# Patient Record
Sex: MALE | Race: OTHER | HISPANIC OR LATINO | URBAN - METROPOLITAN AREA
[De-identification: names, ages, dates, MRNs, and addresses within clinical notes are randomized per-mention and may not be internally consistent; named-entity substitution may affect disease eponyms.]

---

## 2019-08-29 ENCOUNTER — EMERGENCY (EMERGENCY)
Facility: HOSPITAL | Age: 44
LOS: 1 days | Discharge: HOME | End: 2019-08-29
Attending: EMERGENCY MEDICINE | Admitting: EMERGENCY MEDICINE
Payer: COMMERCIAL

## 2019-08-29 VITALS
SYSTOLIC BLOOD PRESSURE: 141 MMHG | DIASTOLIC BLOOD PRESSURE: 77 MMHG | HEART RATE: 85 BPM | TEMPERATURE: 98 F | OXYGEN SATURATION: 100 % | RESPIRATION RATE: 18 BRPM

## 2019-08-29 VITALS
RESPIRATION RATE: 17 BRPM | HEART RATE: 74 BPM | DIASTOLIC BLOOD PRESSURE: 73 MMHG | SYSTOLIC BLOOD PRESSURE: 143 MMHG | OXYGEN SATURATION: 99 % | TEMPERATURE: 98 F

## 2019-08-29 DIAGNOSIS — I77.0 ARTERIOVENOUS FISTULA, ACQUIRED: Chronic | ICD-10-CM

## 2019-08-29 LAB
ALBUMIN SERPL ELPH-MCNC: 3.3 G/DL — SIGNIFICANT CHANGE UP (ref 3.3–5)
ALP SERPL-CCNC: 106 U/L — SIGNIFICANT CHANGE UP (ref 40–120)
ALT FLD-CCNC: 16 U/L — SIGNIFICANT CHANGE UP (ref 12–78)
ANION GAP SERPL CALC-SCNC: 12 MMOL/L — SIGNIFICANT CHANGE UP (ref 5–17)
APTT BLD: 32.4 SEC — SIGNIFICANT CHANGE UP (ref 28.5–37)
AST SERPL-CCNC: 12 U/L — LOW (ref 15–37)
BASOPHILS # BLD AUTO: 0.09 K/UL — SIGNIFICANT CHANGE UP (ref 0–0.2)
BASOPHILS NFR BLD AUTO: 0.9 % — SIGNIFICANT CHANGE UP (ref 0–2)
BILIRUB SERPL-MCNC: 0.6 MG/DL — SIGNIFICANT CHANGE UP (ref 0.2–1.2)
BUN SERPL-MCNC: 33 MG/DL — HIGH (ref 7–23)
CALCIUM SERPL-MCNC: 8.7 MG/DL — SIGNIFICANT CHANGE UP (ref 8.5–10.1)
CHLORIDE SERPL-SCNC: 96 MMOL/L — SIGNIFICANT CHANGE UP (ref 96–108)
CO2 SERPL-SCNC: 29 MMOL/L — SIGNIFICANT CHANGE UP (ref 22–31)
CREAT SERPL-MCNC: 7.4 MG/DL — HIGH (ref 0.5–1.3)
EOSINOPHIL # BLD AUTO: 0.47 K/UL — SIGNIFICANT CHANGE UP (ref 0–0.5)
EOSINOPHIL NFR BLD AUTO: 4.6 % — SIGNIFICANT CHANGE UP (ref 0–6)
GLUCOSE SERPL-MCNC: 160 MG/DL — HIGH (ref 70–99)
HCT VFR BLD CALC: 38.9 % — LOW (ref 39–50)
HGB BLD-MCNC: 12 G/DL — LOW (ref 13–17)
IMM GRANULOCYTES NFR BLD AUTO: 0.7 % — SIGNIFICANT CHANGE UP (ref 0–1.5)
INR BLD: 1.18 RATIO — HIGH (ref 0.88–1.16)
LACTATE SERPL-SCNC: 1.8 MMOL/L — SIGNIFICANT CHANGE UP (ref 0.7–2)
LYMPHOCYTES # BLD AUTO: 3.85 K/UL — HIGH (ref 1–3.3)
LYMPHOCYTES # BLD AUTO: 37.6 % — SIGNIFICANT CHANGE UP (ref 13–44)
MCHC RBC-ENTMCNC: 28.8 PG — SIGNIFICANT CHANGE UP (ref 27–34)
MCHC RBC-ENTMCNC: 30.8 GM/DL — LOW (ref 32–36)
MCV RBC AUTO: 93.5 FL — SIGNIFICANT CHANGE UP (ref 80–100)
MONOCYTES # BLD AUTO: 1.12 K/UL — HIGH (ref 0–0.9)
MONOCYTES NFR BLD AUTO: 10.9 % — SIGNIFICANT CHANGE UP (ref 2–14)
NEUTROPHILS # BLD AUTO: 4.64 K/UL — SIGNIFICANT CHANGE UP (ref 1.8–7.4)
NEUTROPHILS NFR BLD AUTO: 45.3 % — SIGNIFICANT CHANGE UP (ref 43–77)
NRBC # BLD: 0 /100 WBCS — SIGNIFICANT CHANGE UP (ref 0–0)
PLATELET # BLD AUTO: 397 K/UL — SIGNIFICANT CHANGE UP (ref 150–400)
POTASSIUM SERPL-MCNC: 4.3 MMOL/L — SIGNIFICANT CHANGE UP (ref 3.5–5.3)
POTASSIUM SERPL-SCNC: 4.3 MMOL/L — SIGNIFICANT CHANGE UP (ref 3.5–5.3)
PROT SERPL-MCNC: 9.3 G/DL — HIGH (ref 6–8.3)
PROTHROM AB SERPL-ACNC: 13.5 SEC — HIGH (ref 10–12.9)
RBC # BLD: 4.16 M/UL — LOW (ref 4.2–5.8)
RBC # FLD: 15.9 % — HIGH (ref 10.3–14.5)
SODIUM SERPL-SCNC: 137 MMOL/L — SIGNIFICANT CHANGE UP (ref 135–145)
WBC # BLD: 10.24 K/UL — SIGNIFICANT CHANGE UP (ref 3.8–10.5)
WBC # FLD AUTO: 10.24 K/UL — SIGNIFICANT CHANGE UP (ref 3.8–10.5)

## 2019-08-29 PROCEDURE — 73140 X-RAY EXAM OF FINGER(S): CPT | Mod: 26,RT

## 2019-08-29 PROCEDURE — 96365 THER/PROPH/DIAG IV INF INIT: CPT | Mod: XU

## 2019-08-29 PROCEDURE — 73140 X-RAY EXAM OF FINGER(S): CPT

## 2019-08-29 PROCEDURE — 87040 BLOOD CULTURE FOR BACTERIA: CPT

## 2019-08-29 PROCEDURE — 85730 THROMBOPLASTIN TIME PARTIAL: CPT

## 2019-08-29 PROCEDURE — 99284 EMERGENCY DEPT VISIT MOD MDM: CPT

## 2019-08-29 PROCEDURE — 29130 APPL FINGER SPLINT STATIC: CPT | Mod: F9

## 2019-08-29 PROCEDURE — 80053 COMPREHEN METABOLIC PANEL: CPT

## 2019-08-29 PROCEDURE — 36415 COLL VENOUS BLD VENIPUNCTURE: CPT

## 2019-08-29 PROCEDURE — 85610 PROTHROMBIN TIME: CPT

## 2019-08-29 PROCEDURE — 85027 COMPLETE CBC AUTOMATED: CPT

## 2019-08-29 PROCEDURE — 83605 ASSAY OF LACTIC ACID: CPT

## 2019-08-29 PROCEDURE — 99284 EMERGENCY DEPT VISIT MOD MDM: CPT | Mod: 25

## 2019-08-29 RX ORDER — MUPIROCIN 20 MG/G
1 OINTMENT TOPICAL ONCE
Refills: 0 | Status: COMPLETED | OUTPATIENT
Start: 2019-08-29 | End: 2019-08-29

## 2019-08-29 RX ORDER — VANCOMYCIN HCL 1 G
1000 VIAL (EA) INTRAVENOUS ONCE
Refills: 0 | Status: COMPLETED | OUTPATIENT
Start: 2019-08-29 | End: 2019-08-29

## 2019-08-29 RX ORDER — SODIUM CHLORIDE 9 MG/ML
3 INJECTION INTRAMUSCULAR; INTRAVENOUS; SUBCUTANEOUS ONCE
Refills: 0 | Status: COMPLETED | OUTPATIENT
Start: 2019-08-29 | End: 2019-08-29

## 2019-08-29 RX ORDER — ACETAMINOPHEN 500 MG
650 TABLET ORAL ONCE
Refills: 0 | Status: COMPLETED | OUTPATIENT
Start: 2019-08-29 | End: 2019-08-29

## 2019-08-29 RX ADMIN — Medication 650 MILLIGRAM(S): at 18:55

## 2019-08-29 RX ADMIN — MUPIROCIN 1 APPLICATION(S): 20 OINTMENT TOPICAL at 20:00

## 2019-08-29 RX ADMIN — Medication 250 MILLIGRAM(S): at 18:54

## 2019-08-29 RX ADMIN — SODIUM CHLORIDE 3 MILLILITER(S): 9 INJECTION INTRAMUSCULAR; INTRAVENOUS; SUBCUTANEOUS at 18:56

## 2019-08-29 RX ADMIN — Medication 1000 MILLIGRAM(S): at 20:00

## 2019-08-29 RX ADMIN — Medication 650 MILLIGRAM(S): at 19:55

## 2019-08-29 NOTE — ED PROVIDER NOTE - NSFOLLOWUPINSTRUCTIONS_ED_ALL_ED_FT
1. FOLLOW UP WITH YOUR PRIMARY DOCTOR IN 24-48 HOURS.   2. FOLLOW UP WITH ALL SPECIALIST DISCUSSED DURING YOUR VISIT.   3. TAKE ALL MEDICATIONS PRESCRIBED IN THE ER IF ANY ARE PRESCRIBED. CONTINUE YOUR HOME MEDICATIONS UNLESS OTHERWISE ADVISED DIFFERENTLY.   4. RETURN FOR WORSENING SYMPTOMS OR CONCERNS INCLUDING BUT NOT LIMITED TO FEVER, CHEST PAIN, OR TROUBLE BREATHING OR ANY OTHER CONCERNS  5. change dressing daily and apply bactroban given to you in er twice daily  6. soak finger three times a day in betadine given to you with saline that was given to you  7. take clindamycin daily as prescribed.  8 FOLLOW UP WITH DR PASTRANA [phone]

## 2019-08-29 NOTE — ED PROVIDER NOTE - OBJECTIVE STATEMENT
pt is a 42yo male with pmhx of ckd on dialysis, dm on insulin presents with finger pain x 1 week. pt is a 42yo male with pmhx of ckd on dialysis, dm on insulin presents with finger pain x 1 week. pt reports he closed in right hand 5th hayder in a door a week ago. pt reports redness with swelling and abrasion. pt did not take anything for pain. pt denies fever, chills,cp pt is a 44yo male with pmhx of ckd on dialysis, dm on insulin presents with finger pain x 1 week. pt reports he closed in right hand 5th finger in a door a week ago. pt reports redness with swelling and abrasion. pt did not take anything for pain. pt denies fever, chills,cp

## 2019-08-29 NOTE — ED ADULT NURSE NOTE - OBJECTIVE STATEMENT
Pt. received alert and oriented x3 with chief complaint of right hand fifth finger edema post slamming in between car door 2 weeks ago. Pt. presents w/ wound to right hand fifth finger, red and swollen. Pt. denies fever or chills.

## 2019-08-29 NOTE — ED PROVIDER NOTE - ATTENDING CONTRIBUTION TO CARE
Pt seen and examined and d/w PA.  agree with a and p.  pt is 44 yo male with hx of renal disease with right av graft. pt slammed hand in door one week ago and with small cut and pain.  pt states finger now getting red, no pus, no f/c.  on exam with deformity flexion at 5th pip.  scabbing at extensor pip and redness up finger, no pain on passive rom. xray with fx dislocation at pip. d/w hand surg abx, splint, fu in next few days for outpt eval and repair.

## 2019-08-29 NOTE — ED PROVIDER NOTE - PHYSICAL EXAMINATION
+ thrill r ue + fistula  ms r ue:+ 5th digit swelling and erythema. unable to rom sensation intact + 2 radial

## 2019-08-29 NOTE — ED PROVIDER NOTE - CARE PROVIDER_API CALL
DR PASTRANA,   Phone: [phone]  Fax: (   )    -  Follow Up Time:     Brad Pastrana (MD)  Surgery  [address] [place] [id number]  Phone: [phone]  Fax: [phone]  Follow Up Time: 1-3 Days

## 2019-08-29 NOTE — ED PROVIDER NOTE - CARE PLAN
Principal Discharge DX:	Open displaced fracture of distal phalanx of right little finger, initial encounter  Secondary Diagnosis:	Finger infection

## 2019-08-29 NOTE — ED PROVIDER NOTE - PROGRESS NOTE DETAILS
consulted hand la peralta who advised abx and splint. wound care provided. advised bactroban and clinda daily. advised on worsening symptoms and when to return to ed. All imaging and labs reviewed. all results reviewed with pt including abnormal results. pt given a copy of results. pt advised to follow up with pmd regarding abnormal results. All questions answered and concerns addressed. pt verbalized understanding and agreement with plan and dx. pt advised on next step and when/where to follow up. pt advised on all take home and otc medications. pt advised to follow up with PMD. pt advised to return to ed for worsenng symptoms including fever, cp, sob. will dc.

## 2019-08-29 NOTE — ED PROVIDER NOTE - PROVIDER TOKENS
FREE:[LAST:[DR PATSRANA],PHONE:[phone]],FAX:[phone]   [phone]    -]],PROVIDER:[TOKEN:[[id number]],FOLLOWUP:[1-3 Days]]

## 2019-08-29 NOTE — ED ADULT NURSE NOTE - CHIEF COMPLAINT QUOTE
Patient c/o right 5th swollen finger, patient report slammed finger 2 weeks ago while closing a  door

## 2019-09-04 LAB
CULTURE RESULTS: SIGNIFICANT CHANGE UP
CULTURE RESULTS: SIGNIFICANT CHANGE UP
SPECIMEN SOURCE: SIGNIFICANT CHANGE UP
SPECIMEN SOURCE: SIGNIFICANT CHANGE UP

## 2019-11-04 ENCOUNTER — APPOINTMENT (OUTPATIENT)
Dept: PODIATRY | Facility: HOSPITAL | Age: 44
End: 2019-11-04
Payer: COMMERCIAL

## 2019-11-04 ENCOUNTER — OUTPATIENT (OUTPATIENT)
Dept: OUTPATIENT SERVICES | Facility: HOSPITAL | Age: 44
LOS: 1 days | Discharge: HOME | End: 2019-11-04
Payer: COMMERCIAL

## 2019-11-04 VITALS
HEART RATE: 82 BPM | DIASTOLIC BLOOD PRESSURE: 75 MMHG | HEIGHT: 67 IN | WEIGHT: 170 LBS | RESPIRATION RATE: 20 BRPM | BODY MASS INDEX: 26.68 KG/M2 | OXYGEN SATURATION: 100 % | SYSTOLIC BLOOD PRESSURE: 148 MMHG | TEMPERATURE: 97.2 F

## 2019-11-04 DIAGNOSIS — N19 UNSPECIFIED KIDNEY FAILURE: ICD-10-CM

## 2019-11-04 DIAGNOSIS — L97.401 NON-PRESSURE CHRONIC ULCER OF UNSPECIFIED HEEL AND MIDFOOT LIMITED TO BREAKDOWN OF SKIN: ICD-10-CM

## 2019-11-04 DIAGNOSIS — E11.9 TYPE 2 DIABETES MELLITUS W/OUT COMPLICATIONS: ICD-10-CM

## 2019-11-04 DIAGNOSIS — I77.0 ARTERIOVENOUS FISTULA, ACQUIRED: Chronic | ICD-10-CM

## 2019-11-04 DIAGNOSIS — Z78.9 OTHER SPECIFIED HEALTH STATUS: ICD-10-CM

## 2019-11-04 PROBLEM — Z00.00 ENCOUNTER FOR PREVENTIVE HEALTH EXAMINATION: Noted: 2019-11-04

## 2019-11-04 PROBLEM — N18.9 CHRONIC KIDNEY DISEASE, UNSPECIFIED: Chronic | Status: ACTIVE | Noted: 2019-08-29

## 2019-11-04 PROBLEM — Z99.2 DEPENDENCE ON RENAL DIALYSIS: Chronic | Status: ACTIVE | Noted: 2019-08-29

## 2019-11-04 LAB
GRAM STN FLD: SIGNIFICANT CHANGE UP
SPECIMEN SOURCE: SIGNIFICANT CHANGE UP

## 2019-11-04 PROCEDURE — 11043 DBRDMT MUSC&/FSCA 1ST 20/<: CPT

## 2019-11-04 PROCEDURE — G0463: CPT | Mod: 25

## 2019-11-04 PROCEDURE — 87186 SC STD MICRODIL/AGAR DIL: CPT

## 2019-11-04 PROCEDURE — 99203 OFFICE O/P NEW LOW 30 MIN: CPT | Mod: 25

## 2019-11-04 PROCEDURE — 87070 CULTURE OTHR SPECIMN AEROBIC: CPT

## 2019-11-04 RX ORDER — GLUC/MSM/COLGN2/HYAL/ANTIARTH3 375-375-20
TABLET ORAL
Refills: 0 | Status: ACTIVE | COMMUNITY

## 2019-11-04 RX ORDER — INSULIN LISPRO 100 [IU]/ML
100 INJECTION, SOLUTION INTRAVENOUS; SUBCUTANEOUS
Refills: 0 | Status: ACTIVE | COMMUNITY

## 2019-11-04 RX ORDER — INSULIN DETEMIR 100 [IU]/ML
100 INJECTION, SOLUTION SUBCUTANEOUS AT BEDTIME
Refills: 0 | Status: ACTIVE | COMMUNITY

## 2019-11-05 NOTE — PLAN
[FreeTextEntry1] : wound care , off loading , MRI , X rays , patient would greatly benefit from HBOT for DFU 3 and clinical OM of the left heel , patient high risk for limb loss

## 2019-11-05 NOTE — REVIEW OF SYSTEMS
[Arthralgias] : arthralgias [Joint Stiffness] : joint stiffness [Skin Wound] : skin wound [Fever] : no fever [Chills] : no chills [Loss Of Hearing] : no hearing loss [Shortness Of Breath] : no shortness of breath [Abdominal Pain] : no abdominal pain [Vomiting] : no vomiting [Anxiety] : no anxiety [Easy Bleeding] : no tendency for easy bleeding [FreeTextEntry5] : diabetic small vessel diseas and cardio vascular disease  [de-identified] : DFU 3 plantar posterior left heel , ssf [de-identified] : IDDM with neuropathy  [de-identified] : IDDM , patient on Dialysis

## 2019-11-05 NOTE — HISTORY OF PRESENT ILLNESS
[FreeTextEntry1] : Patient with multiple complications from IDDM  patient has had multiple foot surgeries and presently has DFU3  posterior left heel , ssf , possible OM

## 2019-11-06 ENCOUNTER — OTHER (OUTPATIENT)
Age: 44
End: 2019-11-06

## 2019-11-06 LAB
-  AMIKACIN: SIGNIFICANT CHANGE UP
-  AMOXICILLIN/CLAVULANIC ACID: SIGNIFICANT CHANGE UP
-  AMPICILLIN/SULBACTAM: SIGNIFICANT CHANGE UP
-  AMPICILLIN: SIGNIFICANT CHANGE UP
-  AZTREONAM: SIGNIFICANT CHANGE UP
-  CEFAZOLIN: SIGNIFICANT CHANGE UP
-  CEFEPIME: SIGNIFICANT CHANGE UP
-  CEFOXITIN: SIGNIFICANT CHANGE UP
-  CEFTRIAXONE: SIGNIFICANT CHANGE UP
-  CIPROFLOXACIN: SIGNIFICANT CHANGE UP
-  ERTAPENEM: SIGNIFICANT CHANGE UP
-  GENTAMICIN: SIGNIFICANT CHANGE UP
-  IMIPENEM: SIGNIFICANT CHANGE UP
-  LEVOFLOXACIN: SIGNIFICANT CHANGE UP
-  MEROPENEM: SIGNIFICANT CHANGE UP
-  PIPERACILLIN/TAZOBACTAM: SIGNIFICANT CHANGE UP
-  TOBRAMYCIN: SIGNIFICANT CHANGE UP
-  TRIMETHOPRIM/SULFAMETHOXAZOLE: SIGNIFICANT CHANGE UP
METHOD TYPE: SIGNIFICANT CHANGE UP

## 2019-11-06 NOTE — ASSESSMENT
[Stable] : stable [Home] : Home [Stretcher] : Stretcher [Not Applicable - Long Term Care/Home Health Agency] : Long Term Care/Home Health Agency: Not Applicable [Verbal] : Verbal [Written] : Written [Demo] : Demo [Patient] : Patient [Good - alert, interested, motivated] : Good - alert, interested, motivated [Verbalizes knowledge/Understanding] : Verbalizes knowledge/understanding [Dressing changes] : dressing changes [Foot Care] : foot care [Skin Care] : skin care [Signs and symptoms of infection] : sign and symptoms of infection [Surgery] : surgery [How and When to Call] : how and when to call [Labs and Tests] : labs and tests [Hyperbaric Therapy] : hyperbaric therapy [Off-loading] : off-loading [Patient responsibility to plan of care] : patient responsibility to plan of care [Glycemic Control] : glycemic control [FreeTextEntry4] : Debridement performed. \par Auth submitted for debridement, vascular studies, MRI, and HBO\par HBO consent submitted\par Video and HBO checklist signed and completed \par MRI, x ray, vascular studies, and blood work scripts provided for patient \par TM evaluation still to be completed. \par Tissue culture sent to lab\par Follow up in 1 week

## 2019-11-06 NOTE — REASON FOR VISIT
[Consultation] : a consultation visit [FreeTextEntry5] : Left plantar heel  [FreeTextEntry4] : Diabetic patient noticed a ulcer on left foot 3 weeks ago. Patient saw Dr. Trena RUANO last week who referred patient to [facility] [place]. \par Patient stated he had the ulcer opened up and cleaned out 12/2018 with a left heel calcanectomy. Patient stayed at [facility] post procedure and had intravenous antibiotics. Patient also had hyperbarics at [facility] last year as well.

## 2019-11-06 NOTE — PROCEDURE
[Betadine] : betadine [Fibrotic] : fibrotic [Slough] : slough [Necrotic] : necrotic [Scalpel] : scalpel [Sharp scissors] : sharp scissors [Skin] : skin [Fat] : fat [Fascia] : fascia [Sent to Lab] : which was entirely removed and was sent to the laboratory for [Culture and Sensitivity] : culture and sensitivity [Clean] : clean [Pink] : pink [Pressure] : pressure [AgNo3 Cauterization] : AgNo3 cauterization [FreeTextEntry1] : silver alginate  [] : No [FreeTextEntry9] : 1105hr [de-identified] : DFU 3 left heel  [de-identified] : Chao  [FreeTextEntry6] : DFU 3 left heel  [FreeTextEntry7] : same  [de-identified] : 0 [de-identified] : 5cc [de-identified] : necrotic skin, subcutaneous and fat

## 2019-11-06 NOTE — PHYSICAL EXAM
[4 x 4] : 4 x 4  [Abdominal Pad] : Abdominal Pad [0] : left 0 [1+] : left 1+ [Varicose Veins Of Lower Extremities] : bilaterally [Ankle Swelling On The Right] : mild [No Rash or Lesion] : No rash or lesion [Skin Ulcer] : ulcer [Calm] : calm [Purpura] : no purpura  [Petechiae] : no petechiae [de-identified] : comfortable  [de-identified] : Diabetic small vessel and cardio vascular disease  [de-identified] : previous calcanectomy of the right heel , possible OM of the left heel , OM of the right pinkie finger  [de-identified] : DFU 3 platar posterior left heel , skin, subcutaneous and fat  [de-identified] : Diabetic neuropathy , Dialysis  3x per week  [FreeTextEntry1] : Left plantar heel  [FreeTextEntry2] : 1.4 [FreeTextEntry3] : 2.0 [FreeTextEntry4] : 0.1 [de-identified] : Serous/sanguinous [de-identified] : Hard callus  [de-identified] : Tissue  [de-identified] : Silver alginate [de-identified] : Cleansed with NS\par Kerlix\par No neurovascular deficits noted.\par \par Post debridement measurements: 1.6/2.2/0.1\par \par Small amount of bleeding during procedure. Patient reports no post procedure pain. Patient tolerated well. \par \par  [de-identified] : Dorsalis Pedis: 0\par Posterior Tibialis: 0\par Doppler pulses:  Present\par Extremity color: Pink \par Extremity temperature: Warm \par Capillary refill: < 3 \par \par  [TWNoteComboBox4] : Moderate [de-identified] : None [de-identified] : None [de-identified] : >75% [de-identified] : No [TWNoteComboBox7] : Isabela [de-identified] : Cultures obtained [de-identified] : Every other day [de-identified] : Primary Dressing

## 2019-11-06 NOTE — PROCEDURE
[Betadine] : betadine [Fibrotic] : fibrotic [Slough] : slough [Necrotic] : necrotic [Scalpel] : scalpel [Sharp scissors] : sharp scissors [Skin] : skin [Fat] : fat [Fascia] : fascia [Sent to Lab] : which was entirely removed and was sent to the laboratory for [Culture and Sensitivity] : culture and sensitivity [Clean] : clean [Pink] : pink [Pressure] : pressure [AgNo3 Cauterization] : AgNo3 cauterization [FreeTextEntry1] : silver alginate  [] : No [FreeTextEntry9] : 1105hr [de-identified] : DFU 3 left heel  [de-identified] : Chao  [FreeTextEntry6] : DFU 3 left heel  [FreeTextEntry7] : same  [de-identified] : 0 [de-identified] : 5cc [de-identified] : necrotic skin, subcutaneous and fat

## 2019-11-06 NOTE — REASON FOR VISIT
[Consultation] : a consultation visit [FreeTextEntry5] : Left plantar heel  [FreeTextEntry4] : Diabetic patient noticed a ulcer on left foot 3 weeks ago. Patient saw Dr. Trena RUANO last week who referred patient to [facility] [place]. \par Patient stated he had the ulcer opened up and cleaned out 12/2018 with a left heel calcanectomy. Patient stayed at [facility] post procedure and had intravenous antibiotics. Patient also had hyperbarics at [facility] last year as well.

## 2019-11-06 NOTE — PHYSICAL EXAM
[4 x 4] : 4 x 4  [Abdominal Pad] : Abdominal Pad [0] : left 0 [1+] : left 1+ [Varicose Veins Of Lower Extremities] : bilaterally [Ankle Swelling On The Right] : mild [No Rash or Lesion] : No rash or lesion [Skin Ulcer] : ulcer [Calm] : calm [Purpura] : no purpura  [Petechiae] : no petechiae [de-identified] : comfortable  [de-identified] : Diabetic small vessel and cardio vascular disease  [de-identified] : previous calcanectomy of the right heel , possible OM of the left heel , OM of the right pinkie finger  [de-identified] : DFU 3 platar posterior left heel , skin, subcutaneous and fat  [de-identified] : Diabetic neuropathy , Dialysis  3x per week  [FreeTextEntry1] : Left plantar heel  [FreeTextEntry2] : 1.4 [FreeTextEntry3] : 2.0 [FreeTextEntry4] : 0.1 [de-identified] : Serous/sanguinous [de-identified] : Hard callus  [de-identified] : Tissue  [de-identified] : Silver alginate [de-identified] : Cleansed with NS\par Kerlix\par No neurovascular deficits noted.\par \par Post debridement measurements: 1.6/2.2/0.1\par \par Small amount of bleeding during procedure. Patient reports no post procedure pain. Patient tolerated well. \par \par  [de-identified] : Dorsalis Pedis: 0\par Posterior Tibialis: 0\par Doppler pulses:  Present\par Extremity color: Pink \par Extremity temperature: Warm \par Capillary refill: < 3 \par \par  [TWNoteComboBox4] : Moderate [de-identified] : None [de-identified] : None [de-identified] : >75% [de-identified] : No [TWNoteComboBox7] : Isabela [de-identified] : Cultures obtained [de-identified] : Every other day [de-identified] : Primary Dressing

## 2019-11-07 DIAGNOSIS — E11.621 TYPE 2 DIABETES MELLITUS WITH FOOT ULCER: ICD-10-CM

## 2019-11-07 DIAGNOSIS — E11.69 TYPE 2 DIABETES MELLITUS WITH OTHER SPECIFIED COMPLICATION: ICD-10-CM

## 2019-11-07 DIAGNOSIS — Z89.422 ACQUIRED ABSENCE OF OTHER LEFT TOE(S): ICD-10-CM

## 2019-11-07 DIAGNOSIS — Z99.2 DEPENDENCE ON RENAL DIALYSIS: ICD-10-CM

## 2019-11-07 DIAGNOSIS — N19 UNSPECIFIED KIDNEY FAILURE: ICD-10-CM

## 2019-11-07 DIAGNOSIS — Z79.899 OTHER LONG TERM (CURRENT) DRUG THERAPY: ICD-10-CM

## 2019-11-07 DIAGNOSIS — E11.51 TYPE 2 DIABETES MELLITUS WITH DIABETIC PERIPHERAL ANGIOPATHY WITHOUT GANGRENE: ICD-10-CM

## 2019-11-07 DIAGNOSIS — M86.672 OTHER CHRONIC OSTEOMYELITIS, LEFT ANKLE AND FOOT: ICD-10-CM

## 2019-11-07 DIAGNOSIS — L97.422 NON-PRESSURE CHRONIC ULCER OF LEFT HEEL AND MIDFOOT WITH FAT LAYER EXPOSED: ICD-10-CM

## 2019-11-07 DIAGNOSIS — E11.22 TYPE 2 DIABETES MELLITUS WITH DIABETIC CHRONIC KIDNEY DISEASE: ICD-10-CM

## 2019-11-07 DIAGNOSIS — Z79.4 LONG TERM (CURRENT) USE OF INSULIN: ICD-10-CM

## 2019-11-07 DIAGNOSIS — Z98.41 CATARACT EXTRACTION STATUS, RIGHT EYE: ICD-10-CM

## 2019-11-07 DIAGNOSIS — I83.93 ASYMPTOMATIC VARICOSE VEINS OF BILATERAL LOWER EXTREMITIES: ICD-10-CM

## 2019-11-07 DIAGNOSIS — E11.40 TYPE 2 DIABETES MELLITUS WITH DIABETIC NEUROPATHY, UNSPECIFIED: ICD-10-CM

## 2019-11-07 DIAGNOSIS — Z98.42 CATARACT EXTRACTION STATUS, LEFT EYE: ICD-10-CM

## 2019-11-07 LAB
-  AMPICILLIN/SULBACTAM: SIGNIFICANT CHANGE UP
-  AMPICILLIN: SIGNIFICANT CHANGE UP
-  CEFAZOLIN: SIGNIFICANT CHANGE UP
-  CLINDAMYCIN: SIGNIFICANT CHANGE UP
-  DAPTOMYCIN: SIGNIFICANT CHANGE UP
-  ERYTHROMYCIN: SIGNIFICANT CHANGE UP
-  GENTAMICIN: SIGNIFICANT CHANGE UP
-  LINEZOLID: SIGNIFICANT CHANGE UP
-  OXACILLIN: SIGNIFICANT CHANGE UP
-  PENICILLIN: SIGNIFICANT CHANGE UP
-  RIFAMPIN: SIGNIFICANT CHANGE UP
-  TETRACYCLINE: SIGNIFICANT CHANGE UP
-  TETRACYCLINE: SIGNIFICANT CHANGE UP
-  TRIMETHOPRIM/SULFAMETHOXAZOLE: SIGNIFICANT CHANGE UP
-  VANCOMYCIN: SIGNIFICANT CHANGE UP
-  VANCOMYCIN: SIGNIFICANT CHANGE UP
METHOD TYPE: SIGNIFICANT CHANGE UP
METHOD TYPE: SIGNIFICANT CHANGE UP

## 2019-11-09 LAB
CULTURE RESULTS: SIGNIFICANT CHANGE UP
ORGANISM # SPEC MICROSCOPIC CNT: SIGNIFICANT CHANGE UP
SPECIMEN SOURCE: SIGNIFICANT CHANGE UP

## 2019-11-11 ENCOUNTER — FORM ENCOUNTER (OUTPATIENT)
Age: 44
End: 2019-11-11

## 2019-11-12 ENCOUNTER — APPOINTMENT (OUTPATIENT)
Dept: SURGERY | Facility: HOSPITAL | Age: 44
End: 2019-11-12
Payer: COMMERCIAL

## 2019-11-12 ENCOUNTER — APPOINTMENT (OUTPATIENT)
Dept: PODIATRY | Facility: HOSPITAL | Age: 44
End: 2019-11-12

## 2019-11-12 ENCOUNTER — OUTPATIENT (OUTPATIENT)
Dept: OUTPATIENT SERVICES | Facility: HOSPITAL | Age: 44
LOS: 1 days | Discharge: HOME | End: 2019-11-12
Payer: COMMERCIAL

## 2019-11-12 VITALS
BODY MASS INDEX: 26.68 KG/M2 | SYSTOLIC BLOOD PRESSURE: 101 MMHG | WEIGHT: 170 LBS | OXYGEN SATURATION: 100 % | RESPIRATION RATE: 20 BRPM | HEART RATE: 84 BPM | HEIGHT: 67 IN | TEMPERATURE: 97.5 F | DIASTOLIC BLOOD PRESSURE: 54 MMHG

## 2019-11-12 DIAGNOSIS — Z89.422 ACQUIRED ABSENCE OF OTHER LEFT TOE(S): ICD-10-CM

## 2019-11-12 DIAGNOSIS — Z98.49 CATARACT EXTRACTION STATUS, UNSPECIFIED EYE: ICD-10-CM

## 2019-11-12 DIAGNOSIS — L97.422 NON-PRESSURE CHRONIC ULCER OF LEFT HEEL AND MIDFOOT WITH FAT LAYER EXPOSED: ICD-10-CM

## 2019-11-12 DIAGNOSIS — E11.621 TYPE 2 DIABETES MELLITUS WITH FOOT ULCER: ICD-10-CM

## 2019-11-12 DIAGNOSIS — E11.29 TYPE 2 DIABETES MELLITUS WITH OTHER DIABETIC KIDNEY COMPLICATION: ICD-10-CM

## 2019-11-12 DIAGNOSIS — Z99.2 DEPENDENCE ON RENAL DIALYSIS: ICD-10-CM

## 2019-11-12 DIAGNOSIS — Z79.899 OTHER LONG TERM (CURRENT) DRUG THERAPY: ICD-10-CM

## 2019-11-12 DIAGNOSIS — N19 UNSPECIFIED KIDNEY FAILURE: ICD-10-CM

## 2019-11-12 DIAGNOSIS — E11.69 TYPE 2 DIABETES MELLITUS WITH OTHER SPECIFIED COMPLICATION: ICD-10-CM

## 2019-11-12 DIAGNOSIS — M86.672 OTHER CHRONIC OSTEOMYELITIS, LEFT ANKLE AND FOOT: ICD-10-CM

## 2019-11-12 DIAGNOSIS — E88.9 TYPE 2 DIABETES MELLITUS WITH OTHER SPECIFIED COMPLICATION: ICD-10-CM

## 2019-11-12 DIAGNOSIS — Z89.429 ACQUIRED ABSENCE OF OTHER TOE(S), UNSPECIFIED SIDE: ICD-10-CM

## 2019-11-12 DIAGNOSIS — Z79.4 LONG TERM (CURRENT) USE OF INSULIN: ICD-10-CM

## 2019-11-12 DIAGNOSIS — I77.0 ARTERIOVENOUS FISTULA, ACQUIRED: Chronic | ICD-10-CM

## 2019-11-12 PROCEDURE — 71045 X-RAY EXAM CHEST 1 VIEW: CPT | Mod: 26

## 2019-11-12 PROCEDURE — 73630 X-RAY EXAM OF FOOT: CPT

## 2019-11-12 PROCEDURE — 73630 X-RAY EXAM OF FOOT: CPT | Mod: 26,50

## 2019-11-12 PROCEDURE — G0463: CPT

## 2019-11-12 PROCEDURE — 99213 OFFICE O/P EST LOW 20 MIN: CPT | Mod: 25

## 2019-11-12 PROCEDURE — 71045 X-RAY EXAM CHEST 1 VIEW: CPT

## 2019-11-12 NOTE — ASSESSMENT
[Verbal] : Verbal [Patient] : Patient [Fair - mild discomfort, physical impairment, low acceptance] : Fair - mild discomfort, physical impairment, low acceptance [Needs reinforcement] : needs reinforcement [Foot Care] : foot care [Skin Care] : skin care [Pressure relief] : pressure relief [Signs and symptoms of infection] : sign and symptoms of infection [How and When to Call] : how and when to call [Hyperbaric Therapy] : hyperbaric therapy [Patient responsibility to plan of care] : patient responsibility to plan of care [Off-loading] : off-loading [Stable] : stable [Hospital] : Hospital [Not Applicable - Long Term Care/Home Health Agency] : Long Term Care/Home Health Agency: Not Applicable [Wheelchair] : Wheelchair [] : No [FreeTextEntry2] : Infection Prevention \par HBOT\par F/U 1 week or pending completion of testing [FreeTextEntry4] : MD ordered bloodwork, chest xray, and xray of bilateral feet. Patient will complete testing post [facility] visit.\par Vascular studies, and MRI still have not been completed, patient reminded to schedule testing, patient verbalized understanding.\par TM assessment and HBO Checklist completed and signed\par MD discussed culture results with patient, patient verbalized understanding.\par F/U 1 week or pending completion of testing [FreeTextEntry3] : Wound remains the same [FreeTextEntry1] : Left heel grade III DFU, needs MRI and X-ray of foot.

## 2019-11-12 NOTE — PHYSICAL EXAM
[Normal Heart Sounds] : normal heart sounds [1+] : left 1+ [0] : left 0 [Ankle Swelling Bilaterally] : bilaterally  [Alert] : alert [Oriented to Person] : oriented to person [Calm] : calm [4 x 4] : 4 x 4  [Abdominal Pad] : Abdominal Pad [Ankle Swelling (On Exam)] : not present [JVD] : no jugular venous distention  [Varicose Veins Of Lower Extremities] : not present [] : not present [de-identified] : WNL [de-identified] : WD/WN in no acute distress. [de-identified] : Left heel grade III DFU, base is pale red, no drainage, no infection.  [de-identified] : LIBIAL [de-identified] : WNL [FreeTextEntry1] : Plantar Heel  [FreeTextEntry2] : 1.4 [FreeTextEntry3] : 2.2 [de-identified] : serosanginous [FreeTextEntry4] : 0.2 [de-identified] : Silver Alginate [de-identified] : 0.2-0.4cm @ 4-1 o'clock [de-identified] : callus [TWNoteComboBox4] : Small [TWNoteComboBox1] : Left [de-identified] : Cleansed with Normal saline\par  [de-identified] : other [de-identified] : None [TWNoteComboBox6] : Other [de-identified] : None [de-identified] : No [de-identified] : 100% [de-identified] : Ace wraps [de-identified] : Primary Dressing [de-identified] : 3x Weekly

## 2019-11-13 ENCOUNTER — OTHER (OUTPATIENT)
Age: 44
End: 2019-11-13

## 2019-11-14 ENCOUNTER — OTHER (OUTPATIENT)
Age: 44
End: 2019-11-14

## 2019-11-14 ENCOUNTER — APPOINTMENT (OUTPATIENT)
Dept: HYPERBARIC MEDICINE | Facility: HOSPITAL | Age: 44
End: 2019-11-14
Payer: COMMERCIAL

## 2019-11-14 ENCOUNTER — OUTPATIENT (OUTPATIENT)
Dept: OUTPATIENT SERVICES | Facility: HOSPITAL | Age: 44
LOS: 1 days | Discharge: HOME | End: 2019-11-14
Payer: COMMERCIAL

## 2019-11-14 VITALS
DIASTOLIC BLOOD PRESSURE: 77 MMHG | TEMPERATURE: 97 F | SYSTOLIC BLOOD PRESSURE: 154 MMHG | RESPIRATION RATE: 16 BRPM | OXYGEN SATURATION: 100 % | HEART RATE: 75 BPM

## 2019-11-14 VITALS
SYSTOLIC BLOOD PRESSURE: 118 MMHG | TEMPERATURE: 96.6 F | DIASTOLIC BLOOD PRESSURE: 68 MMHG | RESPIRATION RATE: 20 BRPM | HEART RATE: 79 BPM | OXYGEN SATURATION: 99 %

## 2019-11-14 DIAGNOSIS — L97.512 NON-PRESSURE CHRONIC ULCER OF OTHER PART OF RIGHT FOOT WITH FAT LAYER EXPOSED: ICD-10-CM

## 2019-11-14 DIAGNOSIS — I77.0 ARTERIOVENOUS FISTULA, ACQUIRED: Chronic | ICD-10-CM

## 2019-11-14 DIAGNOSIS — E11.621 TYPE 2 DIABETES MELLITUS WITH FOOT ULCER: ICD-10-CM

## 2019-11-14 PROCEDURE — G0463: CPT

## 2019-11-14 PROCEDURE — G0277: CPT

## 2019-11-14 PROCEDURE — 82962 GLUCOSE BLOOD TEST: CPT

## 2019-11-14 PROCEDURE — 99183 HYPERBARIC OXYGEN THERAPY: CPT

## 2019-11-14 NOTE — ASSESSMENT
[Patient undergoing HBO treatment for __________] : Patient undergoing HBO treatment for [unfilled] [No dizziness or thirst] :  No dizziness or thirst [Patient descended without problem for 9 minutes] : Patient descended without problem for 9 minutes [No ear problems] : No ear problems [Tolerating dive well] : Tolerating dive well [No Chest Pain, shortness of breath] : No Chest Pain, shortness of breath [Vital signs stable] : Vital signs stable [Respiratory Rate Stable] : Respiratory Rate Stable [No chest pain, shortness of breath, or ear pain] :  No chest pain, shortness of breath, or ear pain  [Vital Signs stable] : Vital Signs stable [Tolerated Ascent well] : Tolerated Ascent well [No] : No [A physician was present throughout the entire HBOT] : A physician was present throughout the entire HBOT [0] : 0 out of 10 [Continue Treatment Plan] : Continue treatment plan [Clinically Stable] : Clinically stable

## 2019-11-14 NOTE — PROCEDURE
[Outpatient] : Outpatient [Ambulatory] : Patient is ambulatory. [THIS CHAMBER HAS BEEN CLEANED / DISINFECTED] : This chamber has been cleaned / disinfected according to local and hospital policy and procedure prior to this treatment. [100% Cotton] : 100% cotton [Empty all pockets] : empty all pockets [No hair oils, wigs, hairpieces, pins] : no hair oils, wigs, hairpieces, pins  [Pre tx medications] : pre tx medications  [No make-up, creams] : no make-up, creams  [No jewelry] : no jewelry  [No matches, cigarettes, lighters] : no matches, cigarettes, lighters  [Hearing aid removed] : hearing aid removed [Dentures removed] : dentures removed [Ground bracelet on pt's wrist] : ground bracelet on pt's wrist  [Contacts removed] : contacts removed  [Remove nail polish] : remove nail polish  [No reading material] : no reading material  [Bra, undergarments removed] : bra, undergarments removed  [No contraindicated dressings] : no contraindicated dressings [Ground Wire - VISUAL Verification - Intact/Free of Obstruction] : Ground Wire - VISUAL Verification - Intact/Free of Obstruction  [Ground Continuity - Verified < 1 ohm w/ Wrist Strap Barb] : Ground Continuity - Verified < 1 ohm w/ Wrist Strap Barb [Number: ___] : Number: [unfilled] [Diagnosis: ___] : Diagnosis: [unfilled] [Crutches] : crutches [____] : Post-Dive: Time - [unfilled] [Patient demonstrated and verbalized proper technique for using air break mask] : Patient demonstrated and verbalized proper technique for using air break mask [___] : Post-Dive: Value - [unfilled] mg/dL [Patient educated on the risks of CONSUMING ALCOHOL prior to HBOT with understanding] : Patient educated on the risks of CONSUMING ALCOHOL prior to HBOT with understanding [Patient educated on the risks of SMOKING prior to HBOT with understanding] : Patient educated on the risks of SMOKING prior to HBOT with understanding [Clear all fields] : clear all fields [] : No [FreeTextEntry1] : 2.0 [FreeTextEntry3] : 90mins [FreeTextEntry5] : 1330 [FreeTextEntry7] : 1822 [FreeTextEntry9] : 0674 [de-identified] : 7874 [de-identified] : 108mins

## 2019-11-14 NOTE — ADDENDUM
[FreeTextEntry1] : Pt noted to have drainage on dressing , RN notified. Pt dressing was changed prior to tx.  \par Pt informed and demonstrate all ear and chest barotrauma prevention techniques prior to tx. \par pt descended to 2.0 RIVAS @ 1.75psi/min without incident. \par Pt resting @ depth with equal chest rise and fall observed by jacki. \par [facility] called  for 6:00PM P/U. \par Pt ascended from 2.0 RIVAS @ 1.75psi/min without incident. \par Pt tolerated tx well.

## 2019-11-15 ENCOUNTER — APPOINTMENT (OUTPATIENT)
Dept: HYPERBARIC MEDICINE | Facility: HOSPITAL | Age: 44
End: 2019-11-15

## 2019-11-15 ENCOUNTER — OTHER (OUTPATIENT)
Age: 44
End: 2019-11-15

## 2019-11-16 ENCOUNTER — APPOINTMENT (OUTPATIENT)
Dept: HYPERBARIC MEDICINE | Facility: HOSPITAL | Age: 44
End: 2019-11-16

## 2019-11-17 DIAGNOSIS — E11.69 TYPE 2 DIABETES MELLITUS WITH OTHER SPECIFIED COMPLICATION: ICD-10-CM

## 2019-11-17 DIAGNOSIS — M86.672 OTHER CHRONIC OSTEOMYELITIS, LEFT ANKLE AND FOOT: ICD-10-CM

## 2019-11-17 DIAGNOSIS — E11.621 TYPE 2 DIABETES MELLITUS WITH FOOT ULCER: ICD-10-CM

## 2019-11-17 DIAGNOSIS — L97.422 NON-PRESSURE CHRONIC ULCER OF LEFT HEEL AND MIDFOOT WITH FAT LAYER EXPOSED: ICD-10-CM

## 2019-11-18 ENCOUNTER — APPOINTMENT (OUTPATIENT)
Dept: HYPERBARIC MEDICINE | Facility: HOSPITAL | Age: 44
End: 2019-11-18
Payer: COMMERCIAL

## 2019-11-18 ENCOUNTER — OUTPATIENT (OUTPATIENT)
Dept: OUTPATIENT SERVICES | Facility: HOSPITAL | Age: 44
LOS: 1 days | Discharge: HOME | End: 2019-11-18
Payer: COMMERCIAL

## 2019-11-18 VITALS
TEMPERATURE: 97.4 F | HEART RATE: 77 BPM | RESPIRATION RATE: 16 BRPM | SYSTOLIC BLOOD PRESSURE: 154 MMHG | DIASTOLIC BLOOD PRESSURE: 76 MMHG | OXYGEN SATURATION: 100 %

## 2019-11-18 VITALS
OXYGEN SATURATION: 96 % | SYSTOLIC BLOOD PRESSURE: 129 MMHG | RESPIRATION RATE: 20 BRPM | DIASTOLIC BLOOD PRESSURE: 71 MMHG | TEMPERATURE: 97.5 F | HEART RATE: 84 BPM

## 2019-11-18 DIAGNOSIS — E11.621 TYPE 2 DIABETES MELLITUS WITH FOOT ULCER: ICD-10-CM

## 2019-11-18 DIAGNOSIS — I77.0 ARTERIOVENOUS FISTULA, ACQUIRED: Chronic | ICD-10-CM

## 2019-11-18 DIAGNOSIS — E10.621 TYPE 1 DIABETES MELLITUS WITH FOOT ULCER: ICD-10-CM

## 2019-11-18 DIAGNOSIS — L97.522 NON-PRESSURE CHRONIC ULCER OF OTHER PART OF LEFT FOOT WITH FAT LAYER EXPOSED: ICD-10-CM

## 2019-11-18 DIAGNOSIS — Z79.4 LONG TERM (CURRENT) USE OF INSULIN: ICD-10-CM

## 2019-11-18 PROCEDURE — 82962 GLUCOSE BLOOD TEST: CPT

## 2019-11-18 PROCEDURE — 99183 HYPERBARIC OXYGEN THERAPY: CPT

## 2019-11-18 PROCEDURE — G0277: CPT

## 2019-11-19 ENCOUNTER — APPOINTMENT (OUTPATIENT)
Dept: HYPERBARIC MEDICINE | Facility: HOSPITAL | Age: 44
End: 2019-11-19

## 2019-11-20 ENCOUNTER — OUTPATIENT (OUTPATIENT)
Dept: OUTPATIENT SERVICES | Facility: HOSPITAL | Age: 44
LOS: 1 days | Discharge: HOME | End: 2019-11-20
Payer: COMMERCIAL

## 2019-11-20 ENCOUNTER — APPOINTMENT (OUTPATIENT)
Dept: HYPERBARIC MEDICINE | Facility: HOSPITAL | Age: 44
End: 2019-11-20
Payer: COMMERCIAL

## 2019-11-20 VITALS
RESPIRATION RATE: 18 BRPM | TEMPERATURE: 97.6 F | SYSTOLIC BLOOD PRESSURE: 186 MMHG | HEART RATE: 74 BPM | DIASTOLIC BLOOD PRESSURE: 87 MMHG | OXYGEN SATURATION: 100 %

## 2019-11-20 VITALS
OXYGEN SATURATION: 98 % | TEMPERATURE: 97.1 F | DIASTOLIC BLOOD PRESSURE: 51 MMHG | SYSTOLIC BLOOD PRESSURE: 106 MMHG | RESPIRATION RATE: 20 BRPM | HEART RATE: 81 BPM

## 2019-11-20 DIAGNOSIS — E11.621 TYPE 2 DIABETES MELLITUS WITH FOOT ULCER: ICD-10-CM

## 2019-11-20 DIAGNOSIS — I77.0 ARTERIOVENOUS FISTULA, ACQUIRED: Chronic | ICD-10-CM

## 2019-11-20 PROCEDURE — G0277: CPT

## 2019-11-20 PROCEDURE — 82962 GLUCOSE BLOOD TEST: CPT

## 2019-11-20 PROCEDURE — 99183 HYPERBARIC OXYGEN THERAPY: CPT

## 2019-11-20 NOTE — ASSESSMENT
[No change from previous assessment] : No change from previous assessment [Patient prepared for dive] : Patient prepared for dive [Patient undergoing HBO treatment for __________] : Patient undergoing HBO treatment for [unfilled] [Patient descended without problem for 9 minutes] : Patient descended without problem for 9 minutes [No dizziness or thirst] :  No dizziness or thirst [No ear problems] : No ear problems [Vital signs stable] : Vital signs stable [No Chest Pain, shortness of breath] : No Chest Pain, shortness of breath [Tolerating dive well] : Tolerating dive well [Respiratory Rate Stable] : Respiratory Rate Stable [No chest pain, shortness of breath, or ear pain] :  No chest pain, shortness of breath, or ear pain  [Tolerated Ascent well] : Tolerated Ascent well [A physician was present throughout the entire HBOT] : A physician was present throughout the entire HBOT [Vital Signs stable] : Vital Signs stable [No] : No [Clinically Stable] : Clinically stable [FreeTextEntry2] : None [Continue Treatment Plan] : Continue treatment plan [0] : 0 out of 10

## 2019-11-20 NOTE — ASSESSMENT
[No change from previous assessment] : No change from previous assessment [No dizziness or thirst] :  No dizziness or thirst [No ear problems] : No ear problems [Vital signs stable] : Vital signs stable [Tolerating dive well] : Tolerating dive well [No Chest Pain, shortness of breath] : No Chest Pain, shortness of breath [Respiratory Rate Stable] : Respiratory Rate Stable [No chest pain, shortness of breath, or ear pain] :  No chest pain, shortness of breath, or ear pain  [Tolerated Ascent well] : Tolerated Ascent well [Vital Signs stable] : Vital Signs stable [A physician was present throughout the entire HBOT] : A physician was present throughout the entire HBOT [No] : No [Clinically Stable] : Clinically stable [Continue Treatment Plan] : Continue treatment plan [0] : 0 out of 10

## 2019-11-20 NOTE — ADDENDUM
[FreeTextEntry1] : PT ARRIVED VIA WHEEL CHAIR FROM [facility]. \par ALL VITALS WITHIN PARAMETERS FOR HBOT.\par CHRN ADVISED OF NEED FOR DRESSING CHANGE, SWOLLEN DIGITS ON RIGHT HAND AND INJURY TO SCALP. PT REPORTS BOTH INJURIES WERE SUSTAINED OVER 1 MONTH AGO. PT REPORTS HE WAS EVALUATED IN THE ED FOR BOTH INJURIES.\par DRESSING CHANGED PRIOR TO DESCENT DUE TO DRAINAGE AND CONTRAINDICATED DRESSING. PT WAS PROVIDED CHAMBER SAFE PRODUCTS TO CHANGE DRESSING IF NEEDED.\par \par AT 7 PSI PT REPORTED INABILITY TO CLEAR RIGHT EAR CAUSING SLIGHT PAIN. DESCENT HALTED  AND CHAMBER PRESSURE WAS REDUCED TO 5 PSI. PT REPORTED RIGHT EAR HAD CLEARED . PT EXPRESSED WILLINGNESS TO CONTINUE DESCENT. REMAINDER OF DESCENT WAS WITHOUT INCIDENT. PT RESTING AT DEPTH CHEST RISE AND FALL OBSERVED.\par \par TRANSFER OF CARE TO [facility] FOR REMAINDER OF HBO TX\par PT ASCENT WAS WITHOUT INCIDENT. PT TOLERATED HBOT WELL.

## 2019-11-20 NOTE — ADDENDUM
[FreeTextEntry1] : Drainage cleared to be changed post HBO tx. \par Pt descended to 2.0 RIVAS @ 1.75 PSI/min without incident in chamber #2. \par Pt resting @ depth with chest rise and fall observed throughout tx. \par Pt ascended from 2.0 RIVAS @ 1.75 PSI/min without incident. \par Pt tolerated tx well. Pt received wound care post HBO tx.

## 2019-11-20 NOTE — PROCEDURE
[Wheelchair] : wheelchair [Outpatient] : Outpatient [THIS CHAMBER HAS BEEN CLEANED / DISINFECTED] : This chamber has been cleaned / disinfected according to local and hospital policy and procedure prior to this treatment. [Patient educated on the risks of SMOKING prior to HBOT with understanding] : Patient educated on the risks of SMOKING prior to HBOT with understanding [Patient demonstrated and verbalized proper technique for using air break mask] : Patient demonstrated and verbalized proper technique for using air break mask [Patient educated on the risks of CONSUMING ALCOHOL prior to HBOT with understanding] : Patient educated on the risks of CONSUMING ALCOHOL prior to HBOT with understanding [100% Cotton] : 100% cotton [Empty all pockets] : empty all pockets [No hair oils, wigs, hairpieces, pins] : no hair oils, wigs, hairpieces, pins  [Pre tx medications] : pre tx medications  [No make-up, creams] : no make-up, creams  [No matches, cigarettes, lighters] : no matches, cigarettes, lighters  [No jewelry] : no jewelry  [Hearing aid removed] : hearing aid removed [Dentures removed] : dentures removed [Ground bracelet on pt's wrist] : ground bracelet on pt's wrist  [Contacts removed] : contacts removed  [Remove nail polish] : remove nail polish  [No reading material] : no reading material  [Bra, undergarments removed] : bra, undergarments removed  [No contraindicated dressings] : no contraindicated dressings [Ground Continuity - Verified < 1 ohm w/ Wrist Strap Barb] : Ground Continuity - Verified < 1 ohm w/ Wrist Strap Barb [Ground Wire - VISUAL Verification - Intact/Free of Obstruction] : Ground Wire - VISUAL Verification - Intact/Free of Obstruction  [Diagnosis: ___] : Diagnosis: [unfilled] [Number: ___] : Number: [unfilled] [____] : Post-Dive: Time - [unfilled] [___] : Post-Dive: Value - [unfilled] mg/dL [Clear all fields] : clear all fields [] : No [FreeTextEntry3] : 90 [FreeTextEntry5] : 8:13 [FreeTextEntry7] : 8:24 [FreeTextEntry9] : 9:54 [de-identified] : 10:03 [de-identified] : 108

## 2019-11-20 NOTE — PROCEDURE
[Outpatient] : Outpatient [Wheelchair] : wheelchair [THIS CHAMBER HAS BEEN CLEANED / DISINFECTED] : This chamber has been cleaned / disinfected according to local and hospital policy and procedure prior to this treatment. [Patient demonstrated and verbalized proper technique for using air break mask] : Patient demonstrated and verbalized proper technique for using air break mask [Patient educated on the risks of CONSUMING ALCOHOL prior to HBOT with understanding] : Patient educated on the risks of CONSUMING ALCOHOL prior to HBOT with understanding [Patient educated on the risks of SMOKING prior to HBOT with understanding] : Patient educated on the risks of SMOKING prior to HBOT with understanding [No hair oils, wigs, hairpieces, pins] : no hair oils, wigs, hairpieces, pins  [Empty all pockets] : empty all pockets [100% Cotton] : 100% cotton [No jewelry] : no jewelry  [No make-up, creams] : no make-up, creams  [Pre tx medications] : pre tx medications  [Hearing aid removed] : hearing aid removed [No matches, cigarettes, lighters] : no matches, cigarettes, lighters  [Dentures removed] : dentures removed [Ground bracelet on pt's wrist] : ground bracelet on pt's wrist  [No reading material] : no reading material  [Remove nail polish] : remove nail polish  [Contacts removed] : contacts removed  [No contraindicated dressings] : no contraindicated dressings [Bra, undergarments removed] : bra, undergarments removed  [Ground Continuity - Verified < 1 ohm w/ Wrist Strap Barb] : Ground Continuity - Verified < 1 ohm w/ Wrist Strap Barb [Ground Wire - VISUAL Verification - Intact/Free of Obstruction] : Ground Wire - VISUAL Verification - Intact/Free of Obstruction  [Clear all fields] : clear all fields [Number: ___] : Number: [unfilled] [Diagnosis: ___] : Diagnosis: [unfilled] [____] : Post-Dive: Time - [unfilled] [___] : Post-Dive: Value - [unfilled] mg/dL [] : No [FreeTextEntry3] : 90 [FreeTextEntry5] : 5086 [FreeTextEntry7] : 4519 [FreeTextEntry9] : 0768 [de-identified] : 0927 [de-identified] : 108 min

## 2019-11-21 ENCOUNTER — APPOINTMENT (OUTPATIENT)
Dept: HYPERBARIC MEDICINE | Facility: HOSPITAL | Age: 44
End: 2019-11-21
Payer: COMMERCIAL

## 2019-11-21 ENCOUNTER — OUTPATIENT (OUTPATIENT)
Dept: OUTPATIENT SERVICES | Facility: HOSPITAL | Age: 44
LOS: 1 days | Discharge: HOME | End: 2019-11-21
Payer: COMMERCIAL

## 2019-11-21 VITALS
SYSTOLIC BLOOD PRESSURE: 101 MMHG | HEIGHT: 67 IN | OXYGEN SATURATION: 100 % | WEIGHT: 170 LBS | DIASTOLIC BLOOD PRESSURE: 57 MMHG | RESPIRATION RATE: 100 BRPM | HEART RATE: 79 BPM | BODY MASS INDEX: 26.68 KG/M2 | TEMPERATURE: 97.6 F

## 2019-11-21 VITALS
HEART RATE: 75 BPM | DIASTOLIC BLOOD PRESSURE: 83 MMHG | SYSTOLIC BLOOD PRESSURE: 155 MMHG | TEMPERATURE: 96.3 F | OXYGEN SATURATION: 100 % | RESPIRATION RATE: 18 BRPM

## 2019-11-21 DIAGNOSIS — E11.621 TYPE 2 DIABETES MELLITUS WITH FOOT ULCER: ICD-10-CM

## 2019-11-21 DIAGNOSIS — I77.0 ARTERIOVENOUS FISTULA, ACQUIRED: Chronic | ICD-10-CM

## 2019-11-21 PROCEDURE — 99183 HYPERBARIC OXYGEN THERAPY: CPT

## 2019-11-21 PROCEDURE — 82962 GLUCOSE BLOOD TEST: CPT

## 2019-11-21 PROCEDURE — G0277: CPT

## 2019-11-21 NOTE — ASSESSMENT
[Patient undergoing HBO treatment for __________] : Patient undergoing HBO treatment for [unfilled] [Patient descended without problem for 9 minutes] : Patient descended without problem for 9 minutes [No dizziness or thirst] :  No dizziness or thirst [No ear problems] : No ear problems [Vital signs stable] : Vital signs stable [Tolerating dive well] : Tolerating dive well [No Chest Pain, shortness of breath] : No Chest Pain, shortness of breath [Respiratory Rate Stable] : Respiratory Rate Stable [No chest pain, shortness of breath, or ear pain] :  No chest pain, shortness of breath, or ear pain  [Tolerated Ascent well] : Tolerated Ascent well [Vital Signs stable] : Vital Signs stable [A physician was present throughout the entire HBOT] : A physician was present throughout the entire HBOT [No] : No [Clinically Stable] : Clinically stable [Continue Treatment Plan] : Continue treatment plan [0] : 0 out of 10

## 2019-11-21 NOTE — PROCEDURE
[Outpatient] : Outpatient [Wheelchair] : wheelchair [THIS CHAMBER HAS BEEN CLEANED / DISINFECTED] : This chamber has been cleaned / disinfected according to local and hospital policy and procedure prior to this treatment. [Patient demonstrated and verbalized proper technique for using air break mask] : Patient demonstrated and verbalized proper technique for using air break mask [Patient educated on the risks of SMOKING prior to HBOT with understanding] : Patient educated on the risks of SMOKING prior to HBOT with understanding [Patient educated on the risks of CONSUMING ALCOHOL prior to HBOT with understanding] : Patient educated on the risks of CONSUMING ALCOHOL prior to HBOT with understanding [100% Cotton] : 100% cotton [Empty all pockets] : empty all pockets [No hair oils, wigs, hairpieces, pins] : no hair oils, wigs, hairpieces, pins  [Pre tx medications] : pre tx medications  [No make-up, creams] : no make-up, creams  [No jewelry] : no jewelry  [No matches, cigarettes, lighters] : no matches, cigarettes, lighters  [Hearing aid removed] : hearing aid removed [Dentures removed] : dentures removed [Ground bracelet on pt's wrist] : ground bracelet on pt's wrist  [Contacts removed] : contacts removed  [Remove nail polish] : remove nail polish  [No reading material] : no reading material  [Bra, undergarments removed] : bra, undergarments removed  [No contraindicated dressings] : no contraindicated dressings [Ground Wire - VISUAL Verification - Intact/Free of Obstruction] : Ground Wire - VISUAL Verification - Intact/Free of Obstruction  [Ground Continuity - Verified < 1 ohm w/ Wrist Strap Barb] : Ground Continuity - Verified < 1 ohm w/ Wrist Strap Barb [Number: ___] : Number: [unfilled] [Diagnosis: ___] : Diagnosis: [unfilled] [] : Yes [____] : Post-Dive: Time - [unfilled] [___] : Post-Dive: Value - [unfilled] mg/dL [Clear all fields] : clear all fields [FreeTextEntry1] : tx depth  [FreeTextEntry3] : 90 [FreeTextEntry5] : 7952 [FreeTextEntry7] : 6941 [FreeTextEntry9] : 1225 [de-identified] : 1000 [de-identified] : 108 min

## 2019-11-21 NOTE — ADDENDUM
[FreeTextEntry1] : Pt drainage cleared by RN to be changed post HBO tx. \par Pt descended to 2.0 RIVAS @ 1.75 PSI/min without incident in chamber #3. \par PT resting @ depth with chest rise and fall observed throughout tx. \par Pt ascended from depth without incident. \par Pt tolerated tx well. \par Pt receive wound care post HBOT by RN. \par

## 2019-11-22 ENCOUNTER — APPOINTMENT (OUTPATIENT)
Dept: HYPERBARIC MEDICINE | Facility: HOSPITAL | Age: 44
End: 2019-11-22
Payer: COMMERCIAL

## 2019-11-22 ENCOUNTER — OUTPATIENT (OUTPATIENT)
Dept: OUTPATIENT SERVICES | Facility: HOSPITAL | Age: 44
LOS: 1 days | Discharge: HOME | End: 2019-11-22
Payer: COMMERCIAL

## 2019-11-22 VITALS
HEART RATE: 73 BPM | DIASTOLIC BLOOD PRESSURE: 79 MMHG | OXYGEN SATURATION: 100 % | RESPIRATION RATE: 20 BRPM | TEMPERATURE: 97.2 F | SYSTOLIC BLOOD PRESSURE: 165 MMHG

## 2019-11-22 VITALS
OXYGEN SATURATION: 100 % | SYSTOLIC BLOOD PRESSURE: 108 MMHG | TEMPERATURE: 97.6 F | RESPIRATION RATE: 18 BRPM | DIASTOLIC BLOOD PRESSURE: 62 MMHG | HEART RATE: 83 BPM

## 2019-11-22 DIAGNOSIS — E11.621 TYPE 2 DIABETES MELLITUS WITH FOOT ULCER: ICD-10-CM

## 2019-11-22 DIAGNOSIS — I77.0 ARTERIOVENOUS FISTULA, ACQUIRED: Chronic | ICD-10-CM

## 2019-11-22 DIAGNOSIS — Z79.4 LONG TERM (CURRENT) USE OF INSULIN: ICD-10-CM

## 2019-11-22 DIAGNOSIS — L97.422 NON-PRESSURE CHRONIC ULCER OF LEFT HEEL AND MIDFOOT WITH FAT LAYER EXPOSED: ICD-10-CM

## 2019-11-22 PROCEDURE — 99183 HYPERBARIC OXYGEN THERAPY: CPT

## 2019-11-22 PROCEDURE — 82962 GLUCOSE BLOOD TEST: CPT

## 2019-11-22 PROCEDURE — G0277: CPT

## 2019-11-22 NOTE — ASSESSMENT
[No change from previous assessment] : No change from previous assessment [Patient prepared for dive] : Patient prepared for dive [Patient undergoing HBO treatment for __________] : Patient undergoing HBO treatment for [unfilled] [Patient descended without problem for 9 minutes] : Patient descended without problem for 9 minutes [No dizziness or thirst] :  No dizziness or thirst [No ear problems] : No ear problems [Vital signs stable] : Vital signs stable [Tolerating dive well] : Tolerating dive well [No Chest Pain, shortness of breath] : No Chest Pain, shortness of breath [Respiratory Rate Stable] : Respiratory Rate Stable [No chest pain, shortness of breath, or ear pain] :  No chest pain, shortness of breath, or ear pain  [Tolerated Ascent well] : Tolerated Ascent well [Vital Signs stable] : Vital Signs stable [A physician was present throughout the entire HBOT] : A physician was present throughout the entire HBOT [No] : No [Clinically Stable] : Clinically stable [Continue Treatment Plan] : Continue treatment plan [0] : 0 out of 10 [FreeTextEntry2] : None

## 2019-11-22 NOTE — ADDENDUM
[FreeTextEntry1] : Prior to dive, drainage noticed on pt's dressing. RN notified. Pt clear to dive. Pt to receive wound care post HBOT. \par Pt descended to depth without incident in chamber # 1\par Pt is resting @ depth with chest rise an fall observed @ chamber side.\par Pt ascended from depth without incident. \par Pt tolerated tx well\par Pt receive wound care post HBOT by RN. \par

## 2019-11-22 NOTE — PROCEDURE
[Outpatient] : Outpatient [Wheelchair] : wheelchair [THIS CHAMBER HAS BEEN CLEANED / DISINFECTED] : This chamber has been cleaned / disinfected according to local and hospital policy and procedure prior to this treatment. [Patient demonstrated and verbalized proper technique for using air break mask] : Patient demonstrated and verbalized proper technique for using air break mask [Patient educated on the risks of SMOKING prior to HBOT with understanding] : Patient educated on the risks of SMOKING prior to HBOT with understanding [Patient educated on the risks of CONSUMING ALCOHOL prior to HBOT with understanding] : Patient educated on the risks of CONSUMING ALCOHOL prior to HBOT with understanding [100% Cotton] : 100% cotton [Empty all pockets] : empty all pockets [No hair oils, wigs, hairpieces, pins] : no hair oils, wigs, hairpieces, pins  [Pre tx medications] : pre tx medications  [No make-up, creams] : no make-up, creams  [No jewelry] : no jewelry  [No matches, cigarettes, lighters] : no matches, cigarettes, lighters  [Hearing aid removed] : hearing aid removed [Dentures removed] : dentures removed [Ground bracelet on pt's wrist] : ground bracelet on pt's wrist  [Contacts removed] : contacts removed  [Remove nail polish] : remove nail polish  [No reading material] : no reading material  [Bra, undergarments removed] : bra, undergarments removed  [No contraindicated dressings] : no contraindicated dressings [Ground Wire - VISUAL Verification - Intact/Free of Obstruction] : Ground Wire - VISUAL Verification - Intact/Free of Obstruction  [Ground Continuity - Verified < 1 ohm w/ Wrist Strap Barb] : Ground Continuity - Verified < 1 ohm w/ Wrist Strap Barb [Number: ___] : Number: [unfilled] [Diagnosis: ___] : Diagnosis: [unfilled] [____] : Post-Dive: Time - [unfilled] [___] : Post-Dive: Value - [unfilled] mg/dL [Clear all fields] : clear all fields [] : No [FreeTextEntry1] : tx depth  [FreeTextEntry3] : 90 [FreeTextEntry5] : 0512 [FreeTextEntry7] : 8137 [FreeTextEntry9] : 1000 [de-identified] : 1010 [de-identified] : 108 min

## 2019-11-23 DIAGNOSIS — Z79.4 LONG TERM (CURRENT) USE OF INSULIN: ICD-10-CM

## 2019-11-23 DIAGNOSIS — L97.422 NON-PRESSURE CHRONIC ULCER OF LEFT HEEL AND MIDFOOT WITH FAT LAYER EXPOSED: ICD-10-CM

## 2019-11-23 DIAGNOSIS — E11.621 TYPE 2 DIABETES MELLITUS WITH FOOT ULCER: ICD-10-CM

## 2019-11-25 ENCOUNTER — OUTPATIENT (OUTPATIENT)
Dept: OUTPATIENT SERVICES | Facility: HOSPITAL | Age: 44
LOS: 1 days | Discharge: HOME | End: 2019-11-25
Payer: COMMERCIAL

## 2019-11-25 ENCOUNTER — APPOINTMENT (OUTPATIENT)
Dept: HYPERBARIC MEDICINE | Facility: HOSPITAL | Age: 44
End: 2019-11-25
Payer: COMMERCIAL

## 2019-11-25 VITALS
SYSTOLIC BLOOD PRESSURE: 176 MMHG | TEMPERATURE: 97 F | OXYGEN SATURATION: 99 % | RESPIRATION RATE: 20 BRPM | HEART RATE: 83 BPM | DIASTOLIC BLOOD PRESSURE: 86 MMHG

## 2019-11-25 VITALS
SYSTOLIC BLOOD PRESSURE: 150 MMHG | TEMPERATURE: 96.5 F | OXYGEN SATURATION: 100 % | HEART RATE: 75 BPM | DIASTOLIC BLOOD PRESSURE: 75 MMHG | RESPIRATION RATE: 18 BRPM

## 2019-11-25 DIAGNOSIS — Z79.4 LONG TERM (CURRENT) USE OF INSULIN: ICD-10-CM

## 2019-11-25 DIAGNOSIS — E11.621 TYPE 2 DIABETES MELLITUS WITH FOOT ULCER: ICD-10-CM

## 2019-11-25 DIAGNOSIS — L97.422 NON-PRESSURE CHRONIC ULCER OF LEFT HEEL AND MIDFOOT WITH FAT LAYER EXPOSED: ICD-10-CM

## 2019-11-25 DIAGNOSIS — I77.0 ARTERIOVENOUS FISTULA, ACQUIRED: Chronic | ICD-10-CM

## 2019-11-25 PROCEDURE — 82962 GLUCOSE BLOOD TEST: CPT

## 2019-11-25 PROCEDURE — G0277: CPT

## 2019-11-25 PROCEDURE — 99183 HYPERBARIC OXYGEN THERAPY: CPT

## 2019-11-25 NOTE — ASSESSMENT
[Patient undergoing HBO treatment for __________] : Patient undergoing HBO treatment for [unfilled] [Patient descended without problem for 9 minutes] : Patient descended without problem for 9 minutes [No dizziness or thirst] :  No dizziness or thirst [Vital signs stable] : Vital signs stable [No ear problems] : No ear problems [Tolerating dive well] : Tolerating dive well [Respiratory Rate Stable] : Respiratory Rate Stable [No Chest Pain, shortness of breath] : No Chest Pain, shortness of breath [No chest pain, shortness of breath, or ear pain] :  No chest pain, shortness of breath, or ear pain  [Vital Signs stable] : Vital Signs stable [A physician was present throughout the entire HBOT] : A physician was present throughout the entire HBOT [Tolerated Ascent well] : Tolerated Ascent well [No] : No [Continue Treatment Plan] : Continue treatment plan [Clinically Stable] : Clinically stable [0] : 0 out of 10

## 2019-11-25 NOTE — ADDENDUM
[FreeTextEntry1] : DRAINAGE NOTED PRIOR TO DESCENT. RN NOTIFIED. PT CLEARED TO DIVE. PT TO RECEIVE DRESSING CHANGE POST HBOT.\par \par PT DESCENDED TO 2.0 RIVAS @ 1.75 PSI/MIN WITHOUT INCIDENT IN CHAMBER # 1. PT'S RESTING AT TX DEPTH WITH WOUND OFFLOADED. CHEST RISE AND FALL OBSERVED CHAMBERSIDE.\par \par Pt ascended from 2.0 RIVAS @ 1.75 PSI/min without incident. \par Pt tolerated tx well. Pt received wound care post HBO tx.

## 2019-11-25 NOTE — PROCEDURE
[Ambulatory] : Patient is ambulatory. [Wheelchair] : wheelchair [Outpatient] : Outpatient [THIS CHAMBER HAS BEEN CLEANED / DISINFECTED] : This chamber has been cleaned / disinfected according to local and hospital policy and procedure prior to this treatment. [Patient educated on the risks of CONSUMING ALCOHOL prior to HBOT with understanding] : Patient educated on the risks of CONSUMING ALCOHOL prior to HBOT with understanding [Patient demonstrated and verbalized proper technique for using air break mask] : Patient demonstrated and verbalized proper technique for using air break mask [Patient educated on the risks of SMOKING prior to HBOT with understanding] : Patient educated on the risks of SMOKING prior to HBOT with understanding [Empty all pockets] : empty all pockets [100% Cotton] : 100% cotton [Pre tx medications] : pre tx medications  [No hair oils, wigs, hairpieces, pins] : no hair oils, wigs, hairpieces, pins  [No make-up, creams] : no make-up, creams  [No matches, cigarettes, lighters] : no matches, cigarettes, lighters  [No jewelry] : no jewelry  [Ground bracelet on pt's wrist] : ground bracelet on pt's wrist  [Hearing aid removed] : hearing aid removed [Dentures removed] : dentures removed [Remove nail polish] : remove nail polish  [Contacts removed] : contacts removed  [No reading material] : no reading material  [Bra, undergarments removed] : bra, undergarments removed  [No contraindicated dressings] : no contraindicated dressings [Ground Continuity - Verified < 1 ohm w/ Wrist Strap Barb] : Ground Continuity - Verified < 1 ohm w/ Wrist Strap Barb [Ground Wire - VISUAL Verification - Intact/Free of Obstruction] : Ground Wire - VISUAL Verification - Intact/Free of Obstruction  [Number: ___] : Number: [unfilled] [Diagnosis: ___] : Diagnosis: [unfilled] [____] : Post-Dive: Time - [unfilled] [___] : Post-Dive: Value - [unfilled] mg/dL [Clear all fields] : clear all fields [FreeTextEntry3] : 90 [] : No [FreeTextEntry7] : 9982 [FreeTextEntry9] : 8009 [FreeTextEntry5] : 9872 [de-identified] : 108 min [de-identified] : 0920

## 2019-11-26 ENCOUNTER — APPOINTMENT (OUTPATIENT)
Dept: HYPERBARIC MEDICINE | Facility: HOSPITAL | Age: 44
End: 2019-11-26

## 2019-11-26 ENCOUNTER — OUTPATIENT (OUTPATIENT)
Dept: OUTPATIENT SERVICES | Facility: HOSPITAL | Age: 44
LOS: 1 days | Discharge: HOME | End: 2019-11-26
Payer: COMMERCIAL

## 2019-11-26 ENCOUNTER — APPOINTMENT (OUTPATIENT)
Dept: SURGERY | Facility: HOSPITAL | Age: 44
End: 2019-11-26
Payer: COMMERCIAL

## 2019-11-26 VITALS
HEIGHT: 67 IN | RESPIRATION RATE: 20 BRPM | WEIGHT: 170 LBS | SYSTOLIC BLOOD PRESSURE: 167 MMHG | DIASTOLIC BLOOD PRESSURE: 78 MMHG | TEMPERATURE: 96.9 F | OXYGEN SATURATION: 98 % | BODY MASS INDEX: 26.68 KG/M2 | HEART RATE: 79 BPM

## 2019-11-26 DIAGNOSIS — I77.0 ARTERIOVENOUS FISTULA, ACQUIRED: Chronic | ICD-10-CM

## 2019-11-26 DIAGNOSIS — E11.621 TYPE 2 DIABETES MELLITUS WITH FOOT ULCER: ICD-10-CM

## 2019-11-26 PROCEDURE — 99213 OFFICE O/P EST LOW 20 MIN: CPT

## 2019-11-26 PROCEDURE — G0463: CPT

## 2019-11-26 NOTE — PHYSICAL EXAM
[Abdominal Pad] : Abdominal Pad [4 x 4] : 4 x 4  [JVD] : no jugular venous distention  [Normal Heart Sounds] : normal heart sounds [Normal Breath Sounds] : Normal breath sounds [1+] : left 1+ [0] : left 0 [Ankle Swelling Bilaterally] : bilaterally  [Ankle Swelling (On Exam)] : not present [Varicose Veins Of Lower Extremities] : bilaterally [Oriented to Person] : oriented to person [Alert] : alert [] : bilaterally [de-identified] : WD/WN in no acute distress. [Calm] : calm [de-identified] : LIBIAL [de-identified] : WNL [de-identified] : WNL [de-identified] : Left heel ulcer is clean, some undermining, base is red and viable, no drainage, no infection, periwound skin is intact.  [FreeTextEntry1] : Plantar Heel  [FreeTextEntry4] : 0.4 [FreeTextEntry2] : 1.5 [FreeTextEntry3] : 2.2 [de-identified] : 0.4-0.6cm @10-1 o'clock [de-identified] : callus [de-identified] : Silver Alginate [TWNoteComboBox1] : Left [de-identified] : Cleansed with Normal saline\par  [TWNoteComboBox5] : No [TWNoteComboBox4] : Small [TWNoteComboBox6] : Diabetic [de-identified] : Yes [de-identified] : other [de-identified] : Mild [de-identified] : None [de-identified] : 100% [de-identified] : No [de-identified] : 3x Weekly [de-identified] : Ace wraps [de-identified] : Primary Dressing

## 2019-11-26 NOTE — ASSESSMENT
[Verbal] : Verbal [Patient] : Patient [Dressing changes] : dressing changes [Fair - mild discomfort, physical impairment, low acceptance] : Fair - mild discomfort, physical impairment, low acceptance [Verbalizes knowledge/Understanding] : Verbalizes knowledge/understanding [How and When to Call] : how and when to call [Signs and symptoms of infection] : sign and symptoms of infection [Pressure relief] : pressure relief [Compression Therapy] : compression therapy [Off-loading] : off-loading [] : Yes [Patient responsibility to plan of care] : patient responsibility to plan of care [Home] : Home [Stable] : stable [Ambulatory] : Ambulatory [Not Applicable - Long Term Care/Home Health Agency] : Long Term Care/Home Health Agency: Not Applicable [FreeTextEntry2] : Infection Prevention\par Offloading/Pressure relief\par HBOT\par F/U 11/27/19 for HBOT  [FreeTextEntry4] : F/U 11/27/19 for HBOT  [FreeTextEntry1] : Left heel Grade III DFU is clean, no infection.

## 2019-11-27 ENCOUNTER — APPOINTMENT (OUTPATIENT)
Dept: HYPERBARIC MEDICINE | Facility: HOSPITAL | Age: 44
End: 2019-11-27
Payer: COMMERCIAL

## 2019-11-27 ENCOUNTER — OUTPATIENT (OUTPATIENT)
Dept: OUTPATIENT SERVICES | Facility: HOSPITAL | Age: 44
LOS: 1 days | Discharge: HOME | End: 2019-11-27
Payer: COMMERCIAL

## 2019-11-27 VITALS
RESPIRATION RATE: 20 BRPM | OXYGEN SATURATION: 97 % | HEART RATE: 89 BPM | TEMPERATURE: 97 F | SYSTOLIC BLOOD PRESSURE: 140 MMHG | DIASTOLIC BLOOD PRESSURE: 79 MMHG

## 2019-11-27 VITALS
DIASTOLIC BLOOD PRESSURE: 92 MMHG | SYSTOLIC BLOOD PRESSURE: 182 MMHG | RESPIRATION RATE: 18 BRPM | OXYGEN SATURATION: 100 % | HEART RATE: 82 BPM | TEMPERATURE: 97 F

## 2019-11-27 DIAGNOSIS — I77.0 ARTERIOVENOUS FISTULA, ACQUIRED: Chronic | ICD-10-CM

## 2019-11-27 DIAGNOSIS — E11.621 TYPE 2 DIABETES MELLITUS WITH FOOT ULCER: ICD-10-CM

## 2019-11-27 PROCEDURE — G0277: CPT

## 2019-11-27 PROCEDURE — 99183 HYPERBARIC OXYGEN THERAPY: CPT

## 2019-11-27 PROCEDURE — 82962 GLUCOSE BLOOD TEST: CPT

## 2019-11-27 NOTE — PROCEDURE
[Outpatient] : Outpatient [Wheelchair] : wheelchair [THIS CHAMBER HAS BEEN CLEANED / DISINFECTED] : This chamber has been cleaned / disinfected according to local and hospital policy and procedure prior to this treatment. [Patient demonstrated and verbalized proper technique for using air break mask] : Patient demonstrated and verbalized proper technique for using air break mask [Patient educated on the risks of SMOKING prior to HBOT with understanding] : Patient educated on the risks of SMOKING prior to HBOT with understanding [Patient educated on the risks of CONSUMING ALCOHOL prior to HBOT with understanding] : Patient educated on the risks of CONSUMING ALCOHOL prior to HBOT with understanding [100% Cotton] : 100% cotton [Empty all pockets] : empty all pockets [No hair oils, wigs, hairpieces, pins] : no hair oils, wigs, hairpieces, pins  [Pre tx medications] : pre tx medications  [No make-up, creams] : no make-up, creams  [No jewelry] : no jewelry  [No matches, cigarettes, lighters] : no matches, cigarettes, lighters  [Hearing aid removed] : hearing aid removed [Dentures removed] : dentures removed [Ground bracelet on pt's wrist] : ground bracelet on pt's wrist  [Contacts removed] : contacts removed  [Remove nail polish] : remove nail polish  [No reading material] : no reading material  [Bra, undergarments removed] : bra, undergarments removed  [No contraindicated dressings] : no contraindicated dressings [Ground Wire - VISUAL Verification - Intact/Free of Obstruction] : Ground Wire - VISUAL Verification - Intact/Free of Obstruction  [Ground Continuity - Verified < 1 ohm w/ Wrist Strap Barb] : Ground Continuity - Verified < 1 ohm w/ Wrist Strap Barb [Number: ___] : Number: [unfilled] [Diagnosis: ___] : Diagnosis: [unfilled] [____] : Post-Dive: Time - [unfilled] [___] : Post-Dive: Value - [unfilled] mg/dL [Clear all fields] : clear all fields [] : No [FreeTextEntry3] : 90 [FreeTextEntry5] : 8:06 [FreeTextEntry7] : 8:15 [FreeTextEntry9] : 9:45 [de-identified] : 9:54 [de-identified] : 108 min

## 2019-11-27 NOTE — ADDENDUM
[FreeTextEntry1] : PT ARRIVED VIA WHEELCHAIR FROM [facility] A&OX3\par ALL VITALS WITHIN PARAMETERS FOR HBOT. NO DRAINAGE NOTED ON DRESSING PRIOR TO DESCENT.\par PT DESCENT TO 2.0 RIVAS @ 1.75 PSI/MIN IN CHAMBER 1 WAS WITHOUT INCIDENT. \par TRANSFER OF CARE TO [facility]\par Pt ascended from 2.0 RIVAS @ 1.75 PSI/min without incident. \par Pt tolerated tx well. Pt received wound care post HBO tx.

## 2019-11-28 DIAGNOSIS — E11.621 TYPE 2 DIABETES MELLITUS WITH FOOT ULCER: ICD-10-CM

## 2019-11-28 DIAGNOSIS — Z79.4 LONG TERM (CURRENT) USE OF INSULIN: ICD-10-CM

## 2019-11-28 DIAGNOSIS — L97.422 NON-PRESSURE CHRONIC ULCER OF LEFT HEEL AND MIDFOOT WITH FAT LAYER EXPOSED: ICD-10-CM

## 2019-11-29 ENCOUNTER — APPOINTMENT (OUTPATIENT)
Dept: HYPERBARIC MEDICINE | Facility: HOSPITAL | Age: 44
End: 2019-11-29
Payer: COMMERCIAL

## 2019-11-29 ENCOUNTER — OUTPATIENT (OUTPATIENT)
Dept: OUTPATIENT SERVICES | Facility: HOSPITAL | Age: 44
LOS: 1 days | Discharge: HOME | End: 2019-11-29
Payer: COMMERCIAL

## 2019-11-29 VITALS
SYSTOLIC BLOOD PRESSURE: 182 MMHG | RESPIRATION RATE: 18 BRPM | TEMPERATURE: 97.2 F | DIASTOLIC BLOOD PRESSURE: 84 MMHG | OXYGEN SATURATION: 100 % | HEART RATE: 84 BPM

## 2019-11-29 VITALS
DIASTOLIC BLOOD PRESSURE: 84 MMHG | RESPIRATION RATE: 18 BRPM | TEMPERATURE: 97.3 F | SYSTOLIC BLOOD PRESSURE: 150 MMHG | OXYGEN SATURATION: 99 % | HEART RATE: 82 BPM

## 2019-11-29 DIAGNOSIS — E11.621 TYPE 2 DIABETES MELLITUS WITH FOOT ULCER: ICD-10-CM

## 2019-11-29 DIAGNOSIS — I77.0 ARTERIOVENOUS FISTULA, ACQUIRED: Chronic | ICD-10-CM

## 2019-11-29 PROCEDURE — G0277: CPT

## 2019-11-29 PROCEDURE — 99183 HYPERBARIC OXYGEN THERAPY: CPT

## 2019-11-29 PROCEDURE — 82962 GLUCOSE BLOOD TEST: CPT

## 2019-11-30 DIAGNOSIS — E11.621 TYPE 2 DIABETES MELLITUS WITH FOOT ULCER: ICD-10-CM

## 2019-11-30 DIAGNOSIS — Z79.4 LONG TERM (CURRENT) USE OF INSULIN: ICD-10-CM

## 2019-11-30 DIAGNOSIS — L97.422 NON-PRESSURE CHRONIC ULCER OF LEFT HEEL AND MIDFOOT WITH FAT LAYER EXPOSED: ICD-10-CM

## 2019-12-02 ENCOUNTER — OUTPATIENT (OUTPATIENT)
Dept: OUTPATIENT SERVICES | Facility: HOSPITAL | Age: 44
LOS: 1 days | Discharge: HOME | End: 2019-12-02
Payer: COMMERCIAL

## 2019-12-02 ENCOUNTER — APPOINTMENT (OUTPATIENT)
Dept: HYPERBARIC MEDICINE | Facility: HOSPITAL | Age: 44
End: 2019-12-02
Payer: COMMERCIAL

## 2019-12-02 VITALS
DIASTOLIC BLOOD PRESSURE: 80 MMHG | RESPIRATION RATE: 20 BRPM | HEART RATE: 82 BPM | TEMPERATURE: 97.2 F | OXYGEN SATURATION: 100 % | SYSTOLIC BLOOD PRESSURE: 172 MMHG

## 2019-12-02 VITALS
OXYGEN SATURATION: 100 % | SYSTOLIC BLOOD PRESSURE: 197 MMHG | DIASTOLIC BLOOD PRESSURE: 87 MMHG | TEMPERATURE: 97.2 F | RESPIRATION RATE: 18 BRPM | HEART RATE: 74 BPM

## 2019-12-02 VITALS — SYSTOLIC BLOOD PRESSURE: 190 MMHG | DIASTOLIC BLOOD PRESSURE: 87 MMHG

## 2019-12-02 DIAGNOSIS — Z89.429 ACQUIRED ABSENCE OF OTHER TOE(S), UNSPECIFIED SIDE: ICD-10-CM

## 2019-12-02 DIAGNOSIS — E10.621 TYPE 1 DIABETES MELLITUS WITH FOOT ULCER: ICD-10-CM

## 2019-12-02 DIAGNOSIS — Z99.2 DEPENDENCE ON RENAL DIALYSIS: ICD-10-CM

## 2019-12-02 DIAGNOSIS — E10.69 TYPE 1 DIABETES MELLITUS WITH OTHER SPECIFIED COMPLICATION: ICD-10-CM

## 2019-12-02 DIAGNOSIS — Z79.4 LONG TERM (CURRENT) USE OF INSULIN: ICD-10-CM

## 2019-12-02 DIAGNOSIS — N19 UNSPECIFIED KIDNEY FAILURE: ICD-10-CM

## 2019-12-02 DIAGNOSIS — L97.422 NON-PRESSURE CHRONIC ULCER OF LEFT HEEL AND MIDFOOT WITH FAT LAYER EXPOSED: ICD-10-CM

## 2019-12-02 DIAGNOSIS — E11.29 TYPE 2 DIABETES MELLITUS WITH OTHER DIABETIC KIDNEY COMPLICATION: ICD-10-CM

## 2019-12-02 DIAGNOSIS — E11.621 TYPE 2 DIABETES MELLITUS WITH FOOT ULCER: ICD-10-CM

## 2019-12-02 DIAGNOSIS — Z98.49 CATARACT EXTRACTION STATUS, UNSPECIFIED EYE: ICD-10-CM

## 2019-12-02 DIAGNOSIS — Z79.899 OTHER LONG TERM (CURRENT) DRUG THERAPY: ICD-10-CM

## 2019-12-02 DIAGNOSIS — M86.672 OTHER CHRONIC OSTEOMYELITIS, LEFT ANKLE AND FOOT: ICD-10-CM

## 2019-12-02 DIAGNOSIS — I77.0 ARTERIOVENOUS FISTULA, ACQUIRED: Chronic | ICD-10-CM

## 2019-12-02 PROCEDURE — 82962 GLUCOSE BLOOD TEST: CPT

## 2019-12-02 PROCEDURE — 99183 HYPERBARIC OXYGEN THERAPY: CPT

## 2019-12-02 PROCEDURE — G0277: CPT

## 2019-12-02 NOTE — ASSESSMENT
[No change from previous assessment] : No change from previous assessment [No dizziness or thirst] :  No dizziness or thirst [No ear problems] : No ear problems [No Chest Pain, shortness of breath] : No Chest Pain, shortness of breath [Tolerating dive well] : Tolerating dive well [Vital signs stable] : Vital signs stable [Respiratory Rate Stable] : Respiratory Rate Stable [No chest pain, shortness of breath, or ear pain] :  No chest pain, shortness of breath, or ear pain  [Vital Signs stable] : Vital Signs stable [Tolerated Ascent well] : Tolerated Ascent well [Clinically Stable] : Clinically stable [No] : No [A physician was present throughout the entire HBOT] : A physician was present throughout the entire HBOT [0] : 0 out of 10

## 2019-12-02 NOTE — ASSESSMENT
[No change from previous assessment] : No change from previous assessment [No ear problems] : No ear problems [No dizziness or thirst] :  No dizziness or thirst [Vital signs stable] : Vital signs stable [Tolerating dive well] : Tolerating dive well [No chest pain, shortness of breath, or ear pain] :  No chest pain, shortness of breath, or ear pain  [No Chest Pain, shortness of breath] : No Chest Pain, shortness of breath [Respiratory Rate Stable] : Respiratory Rate Stable [Vital Signs stable] : Vital Signs stable [Tolerated Ascent well] : Tolerated Ascent well [A physician was present throughout the entire HBOT] : A physician was present throughout the entire HBOT [No] : No [0] : 0 out of 10 [Clinically Stable] : Clinically stable

## 2019-12-03 ENCOUNTER — APPOINTMENT (OUTPATIENT)
Dept: HYPERBARIC MEDICINE | Facility: HOSPITAL | Age: 44
End: 2019-12-03

## 2019-12-03 NOTE — PROCEDURE
[Outpatient] : Outpatient [Wheelchair] : wheelchair [THIS CHAMBER HAS BEEN CLEANED / DISINFECTED] : This chamber has been cleaned / disinfected according to local and hospital policy and procedure prior to this treatment. [___] : Post-Dive: Value - [unfilled] mg/dL [____] : Post-Dive: Time - [unfilled] [Patient demonstrated and verbalized proper technique for using air break mask] : Patient demonstrated and verbalized proper technique for using air break mask [Patient educated on the risks of SMOKING prior to HBOT with understanding] : Patient educated on the risks of SMOKING prior to HBOT with understanding [Patient educated on the risks of CONSUMING ALCOHOL prior to HBOT with understanding] : Patient educated on the risks of CONSUMING ALCOHOL prior to HBOT with understanding [100% Cotton] : 100% cotton [Empty all pockets] : empty all pockets [Pre tx medications] : pre tx medications  [No hair oils, wigs, hairpieces, pins] : no hair oils, wigs, hairpieces, pins  [No make-up, creams] : no make-up, creams  [No jewelry] : no jewelry  [No matches, cigarettes, lighters] : no matches, cigarettes, lighters  [Ground bracelet on pt's wrist] : ground bracelet on pt's wrist  [Dentures removed] : dentures removed [Hearing aid removed] : hearing aid removed [Remove nail polish] : remove nail polish  [Contacts removed] : contacts removed  [No reading material] : no reading material  [Bra, undergarments removed] : bra, undergarments removed  [Ground Wire - VISUAL Verification - Intact/Free of Obstruction] : Ground Wire - VISUAL Verification - Intact/Free of Obstruction  [No contraindicated dressings] : no contraindicated dressings [Ground Continuity - Verified < 1 ohm w/ Wrist Strap Barb] : Ground Continuity - Verified < 1 ohm w/ Wrist Strap Barb [Number: ___] : Number: [unfilled] [Clear all fields] : clear all fields [Diagnosis: ___] : Diagnosis: [unfilled] [] : No [FreeTextEntry1] : tx depth  [FreeTextEntry3] : 90 [FreeTextEntry5] : 5374 [de-identified] : 108 min  [FreeTextEntry7] : 7693 [FreeTextEntry9] : 5440 [de-identified] : 0932

## 2019-12-03 NOTE — ADDENDUM
[FreeTextEntry1] : Prior to dive, drainage noticed on pt's dressing. RN notified. Pt clear to dive. Pt to receive wound care post HBOT.\par Pt blood pressure above parameters. RN and MD notified. Pt clear to dive without re-test. \par Pt descended to depth without incident in chamber # \par Pt is resting @ depth with chest rise an fall observed @ chamber side.\par Pt ascended from depth without incident. \par Pt tolerated tx well\par Pt receive wound care post HBOT by RN.\par \par

## 2019-12-03 NOTE — ADDENDUM
[FreeTextEntry1] : Drainage cleared by RN to be changed post HBO tx. Pt's foot offloaded using wedge.\par Pt's BGL low. RN notified. Pt given 16g of glucose via 8oz juice. Pt BGL retested and still low. MD notified. Pt cleared to dive without further intervention or retest. \par Pt descended to 2.0 RIVAS @ 1.75 PSI/min without incident in chamber #1. Pt resting @ depth with chest rise and fall observed throughout tx. \par Care transferred to [facility] upon ascent.\par \par PT ASCENDED FROM 2.0 RIVAS @ 1.75 PSI/MIN WITHOUT INCIDENT. \par \par POST HBOT, PT'S BLOOD PRESSURE HIGH. IMMEDIATE RE-TEST PERFORMED. PT'S BLOOD PRESSURE WITHIN PARAMETERS. PT LEFT [facility] AFTER DRESSING CHANGE.\par

## 2019-12-03 NOTE — PROCEDURE
[Outpatient] : Outpatient [Ambulatory] : Patient is ambulatory. [THIS CHAMBER HAS BEEN CLEANED / DISINFECTED] : This chamber has been cleaned / disinfected according to local and hospital policy and procedure prior to this treatment. [Patient demonstrated and verbalized proper technique for using air break mask] : Patient demonstrated and verbalized proper technique for using air break mask [Patient educated on the risks of SMOKING prior to HBOT with understanding] : Patient educated on the risks of SMOKING prior to HBOT with understanding [Patient educated on the risks of CONSUMING ALCOHOL prior to HBOT with understanding] : Patient educated on the risks of CONSUMING ALCOHOL prior to HBOT with understanding [Empty all pockets] : empty all pockets [100% Cotton] : 100% cotton [No hair oils, wigs, hairpieces, pins] : no hair oils, wigs, hairpieces, pins  [No make-up, creams] : no make-up, creams  [Pre tx medications] : pre tx medications  [No matches, cigarettes, lighters] : no matches, cigarettes, lighters  [No jewelry] : no jewelry  [Hearing aid removed] : hearing aid removed [Dentures removed] : dentures removed [Contacts removed] : contacts removed  [Remove nail polish] : remove nail polish  [Ground bracelet on pt's wrist] : ground bracelet on pt's wrist  [Bra, undergarments removed] : bra, undergarments removed  [No reading material] : no reading material  [No contraindicated dressings] : no contraindicated dressings [Ground Wire - VISUAL Verification - Intact/Free of Obstruction] : Ground Wire - VISUAL Verification - Intact/Free of Obstruction  [Ground Continuity - Verified < 1 ohm w/ Wrist Strap Barb] : Ground Continuity - Verified < 1 ohm w/ Wrist Strap Barb [Number: ___] : Number: [unfilled] [Diagnosis: ___] : Diagnosis: [unfilled] [Wheelchair] : wheelchair [____] : Post-Dive: Time - [unfilled] [___] : Post-Dive: Value - [unfilled] mg/dL [Clear all fields] : clear all fields [] : No [FreeTextEntry3] : 90 [FreeTextEntry5] : 0006 [FreeTextEntry7] : 7180 [FreeTextEntry9] : 3478 [de-identified] : 1002 [de-identified] : 108 MIN

## 2019-12-04 ENCOUNTER — APPOINTMENT (OUTPATIENT)
Dept: HYPERBARIC MEDICINE | Facility: HOSPITAL | Age: 44
End: 2019-12-04
Payer: COMMERCIAL

## 2019-12-04 ENCOUNTER — OUTPATIENT (OUTPATIENT)
Dept: OUTPATIENT SERVICES | Facility: HOSPITAL | Age: 44
LOS: 1 days | Discharge: HOME | End: 2019-12-04
Payer: COMMERCIAL

## 2019-12-04 VITALS
SYSTOLIC BLOOD PRESSURE: 108 MMHG | RESPIRATION RATE: 20 BRPM | DIASTOLIC BLOOD PRESSURE: 65 MMHG | TEMPERATURE: 97.7 F | HEART RATE: 81 BPM | OXYGEN SATURATION: 100 %

## 2019-12-04 DIAGNOSIS — L97.422 NON-PRESSURE CHRONIC ULCER OF LEFT HEEL AND MIDFOOT WITH FAT LAYER EXPOSED: ICD-10-CM

## 2019-12-04 DIAGNOSIS — I77.0 ARTERIOVENOUS FISTULA, ACQUIRED: Chronic | ICD-10-CM

## 2019-12-04 DIAGNOSIS — Z79.4 LONG TERM (CURRENT) USE OF INSULIN: ICD-10-CM

## 2019-12-04 DIAGNOSIS — E11.621 TYPE 2 DIABETES MELLITUS WITH FOOT ULCER: ICD-10-CM

## 2019-12-04 DIAGNOSIS — E10.621 TYPE 1 DIABETES MELLITUS WITH FOOT ULCER: ICD-10-CM

## 2019-12-04 PROCEDURE — G0463: CPT

## 2019-12-04 PROCEDURE — 82962 GLUCOSE BLOOD TEST: CPT

## 2019-12-04 PROCEDURE — 99212 OFFICE O/P EST SF 10 MIN: CPT

## 2019-12-04 NOTE — ADDENDUM
[FreeTextEntry1] : PT ARRIVED VIA WHEELCHAIR FROM [facility] A[facility]OX[facility]\par ALL VITALS WITHIN PARAMETERS FOR HBOT.\par PT REPORTS HAVING AND INJECTION IN HIS EYE ON 12/03/19. \par RN AND MEDICAL DIRECTOR ADVISED. TX DEFERRED UNTIL 12/05/19 PER MEDICAL DIRECTOR\par WOUND CARE PROVIDED PRIOR TO PT LEAVING THE UNIT.\par YUSEF SHAH 12/04/19 8:40

## 2019-12-04 NOTE — ASSESSMENT
[Some change from previous assessment] : Some change from previous assessment [Patient prepared for dive] : Patient prepared for dive [Patient undergoing HBO treatment for __________] : Patient undergoing HBO treatment for [unfilled] [No] : No [FreeTextEntry3] : HBOT cancelled for today because patient had steroid injection into eye yesterday for a burst blood vessel.

## 2019-12-04 NOTE — PROCEDURE
[Outpatient] : Outpatient [Wheelchair] : wheelchair [THIS CHAMBER HAS BEEN CLEANED / DISINFECTED] : This chamber has been cleaned / disinfected according to local and hospital policy and procedure prior to this treatment. [___] : Post-Dive: Value - [unfilled] mg/dL [____] : Post-Dive: Time - [unfilled] [Deferred Treatment] : Deferred Treatment [Patient educated on the risks of SMOKING prior to HBOT with understanding] : Patient educated on the risks of SMOKING prior to HBOT with understanding [Patient demonstrated and verbalized proper technique for using air break mask] : Patient demonstrated and verbalized proper technique for using air break mask [Patient educated on the risks of CONSUMING ALCOHOL prior to HBOT with understanding] : Patient educated on the risks of CONSUMING ALCOHOL prior to HBOT with understanding [100% Cotton] : 100% cotton [Empty all pockets] : empty all pockets [No hair oils, wigs, hairpieces, pins] : no hair oils, wigs, hairpieces, pins  [Pre tx medications] : pre tx medications  [No make-up, creams] : no make-up, creams  [No jewelry] : no jewelry  [No matches, cigarettes, lighters] : no matches, cigarettes, lighters  [Hearing aid removed] : hearing aid removed [Dentures removed] : dentures removed [Remove nail polish] : remove nail polish  [Contacts removed] : contacts removed  [Ground bracelet on pt's wrist] : ground bracelet on pt's wrist  [Bra, undergarments removed] : bra, undergarments removed  [No reading material] : no reading material  [No contraindicated dressings] : no contraindicated dressings [Ground Wire - VISUAL Verification - Intact/Free of Obstruction] : Ground Wire - VISUAL Verification - Intact/Free of Obstruction  [Ground Continuity - Verified < 1 ohm w/ Wrist Strap Barb] : Ground Continuity - Verified < 1 ohm w/ Wrist Strap Barb [Clear all fields] : clear all fields [Number: ___] : Number: [unfilled] [Diagnosis: ___] : Diagnosis: [unfilled] [de-identified] : PT STATES HE RECEIVED AN INJECTION IN HIS EYE ON 12/03/19. MEDICAL DIRECTOR DEFERRED HBOT UNTIL 12/05/19 [] : No [FreeTextEntry3] : ----- [de-identified] : -------

## 2019-12-05 ENCOUNTER — APPOINTMENT (OUTPATIENT)
Dept: HYPERBARIC MEDICINE | Facility: HOSPITAL | Age: 44
End: 2019-12-05
Payer: COMMERCIAL

## 2019-12-05 ENCOUNTER — OUTPATIENT (OUTPATIENT)
Dept: OUTPATIENT SERVICES | Facility: HOSPITAL | Age: 44
LOS: 1 days | Discharge: HOME | End: 2019-12-05
Payer: COMMERCIAL

## 2019-12-05 VITALS
TEMPERATURE: 97.2 F | OXYGEN SATURATION: 100 % | SYSTOLIC BLOOD PRESSURE: 113 MMHG | HEART RATE: 78 BPM | DIASTOLIC BLOOD PRESSURE: 56 MMHG | RESPIRATION RATE: 16 BRPM

## 2019-12-05 VITALS
TEMPERATURE: 97.3 F | HEART RATE: 72 BPM | RESPIRATION RATE: 18 BRPM | OXYGEN SATURATION: 100 % | DIASTOLIC BLOOD PRESSURE: 82 MMHG | SYSTOLIC BLOOD PRESSURE: 173 MMHG

## 2019-12-05 DIAGNOSIS — E10.621 TYPE 1 DIABETES MELLITUS WITH FOOT ULCER: ICD-10-CM

## 2019-12-05 DIAGNOSIS — I77.0 ARTERIOVENOUS FISTULA, ACQUIRED: Chronic | ICD-10-CM

## 2019-12-05 DIAGNOSIS — L97.422 NON-PRESSURE CHRONIC ULCER OF LEFT HEEL AND MIDFOOT WITH FAT LAYER EXPOSED: ICD-10-CM

## 2019-12-05 DIAGNOSIS — Z79.4 LONG TERM (CURRENT) USE OF INSULIN: ICD-10-CM

## 2019-12-05 DIAGNOSIS — E11.621 TYPE 2 DIABETES MELLITUS WITH FOOT ULCER: ICD-10-CM

## 2019-12-05 PROCEDURE — 99183 HYPERBARIC OXYGEN THERAPY: CPT

## 2019-12-05 PROCEDURE — 82962 GLUCOSE BLOOD TEST: CPT

## 2019-12-05 PROCEDURE — G0277: CPT

## 2019-12-05 NOTE — ASSESSMENT
[No dizziness or thirst] :  No dizziness or thirst [Patient undergoing HBO treatment for __________] : Patient undergoing HBO treatment for [unfilled] [Patient descended without problem for 9 minutes] : Patient descended without problem for 9 minutes [Tolerating dive well] : Tolerating dive well [Vital signs stable] : Vital signs stable [No ear problems] : No ear problems [No Chest Pain, shortness of breath] : No Chest Pain, shortness of breath [Respiratory Rate Stable] : Respiratory Rate Stable [No chest pain, shortness of breath, or ear pain] :  No chest pain, shortness of breath, or ear pain  [Tolerated Ascent well] : Tolerated Ascent well [Vital Signs stable] : Vital Signs stable [A physician was present throughout the entire HBOT] : A physician was present throughout the entire HBOT [Clinically Stable] : Clinically stable [Continue Treatment Plan] : Continue treatment plan [No] : No [0] : 0 out of 10

## 2019-12-06 ENCOUNTER — OUTPATIENT (OUTPATIENT)
Dept: OUTPATIENT SERVICES | Facility: HOSPITAL | Age: 44
LOS: 1 days | Discharge: HOME | End: 2019-12-06
Payer: COMMERCIAL

## 2019-12-06 ENCOUNTER — APPOINTMENT (OUTPATIENT)
Dept: HYPERBARIC MEDICINE | Facility: HOSPITAL | Age: 44
End: 2019-12-06
Payer: COMMERCIAL

## 2019-12-06 VITALS
HEART RATE: 72 BPM | DIASTOLIC BLOOD PRESSURE: 90 MMHG | SYSTOLIC BLOOD PRESSURE: 157 MMHG | TEMPERATURE: 97.4 F | OXYGEN SATURATION: 100 % | RESPIRATION RATE: 16 BRPM

## 2019-12-06 VITALS
HEART RATE: 77 BPM | SYSTOLIC BLOOD PRESSURE: 120 MMHG | RESPIRATION RATE: 20 BRPM | DIASTOLIC BLOOD PRESSURE: 70 MMHG | OXYGEN SATURATION: 97 % | TEMPERATURE: 97.6 F

## 2019-12-06 DIAGNOSIS — E11.621 TYPE 2 DIABETES MELLITUS WITH FOOT ULCER: ICD-10-CM

## 2019-12-06 DIAGNOSIS — I77.0 ARTERIOVENOUS FISTULA, ACQUIRED: Chronic | ICD-10-CM

## 2019-12-06 PROCEDURE — 99183 HYPERBARIC OXYGEN THERAPY: CPT

## 2019-12-06 PROCEDURE — G0277: CPT

## 2019-12-06 PROCEDURE — 82962 GLUCOSE BLOOD TEST: CPT

## 2019-12-06 NOTE — ASSESSMENT
[Patient undergoing HBO treatment for __________] : Patient undergoing HBO treatment for [unfilled] [No dizziness or thirst] :  No dizziness or thirst [No ear problems] : No ear problems [Patient descended without problem for 9 minutes] : Patient descended without problem for 9 minutes [No Chest Pain, shortness of breath] : No Chest Pain, shortness of breath [Tolerating dive well] : Tolerating dive well [Vital signs stable] : Vital signs stable [No chest pain, shortness of breath, or ear pain] :  No chest pain, shortness of breath, or ear pain  [Respiratory Rate Stable] : Respiratory Rate Stable [Tolerated Ascent well] : Tolerated Ascent well [A physician was present throughout the entire HBOT] : A physician was present throughout the entire HBOT [Vital Signs stable] : Vital Signs stable [No] : No [Continue Treatment Plan] : Continue treatment plan [Clinically Stable] : Clinically stable [0] : 0 out of 10

## 2019-12-06 NOTE — PROCEDURE
[Outpatient] : Outpatient [Wheelchair] : wheelchair [Patient educated on the risks of SMOKING prior to HBOT with understanding] : Patient educated on the risks of SMOKING prior to HBOT with understanding [Patient demonstrated and verbalized proper technique for using air break mask] : Patient demonstrated and verbalized proper technique for using air break mask [Patient educated on the risks of CONSUMING ALCOHOL prior to HBOT with understanding] : Patient educated on the risks of CONSUMING ALCOHOL prior to HBOT with understanding [THIS CHAMBER HAS BEEN CLEANED / DISINFECTED] : This chamber has been cleaned / disinfected according to local and hospital policy and procedure prior to this treatment. [No hair oils, wigs, hairpieces, pins] : no hair oils, wigs, hairpieces, pins  [100% Cotton] : 100% cotton [Empty all pockets] : empty all pockets [Pre tx medications] : pre tx medications  [No make-up, creams] : no make-up, creams  [No jewelry] : no jewelry  [No matches, cigarettes, lighters] : no matches, cigarettes, lighters  [Hearing aid removed] : hearing aid removed [Dentures removed] : dentures removed [Contacts removed] : contacts removed  [Ground bracelet on pt's wrist] : ground bracelet on pt's wrist  [Remove nail polish] : remove nail polish  [No reading material] : no reading material  [Bra, undergarments removed] : bra, undergarments removed  [Ground Continuity - Verified < 1 ohm w/ Wrist Strap Barb] : Ground Continuity - Verified < 1 ohm w/ Wrist Strap Barb [Ground Wire - VISUAL Verification - Intact/Free of Obstruction] : Ground Wire - VISUAL Verification - Intact/Free of Obstruction  [No contraindicated dressings] : no contraindicated dressings [Number: ___] : Number: [unfilled] [Diagnosis: ___] : Diagnosis: [unfilled] [____] : Post-Dive: Time - [unfilled] [___] : Post-Dive: Value - [unfilled] mg/dL [Clear all fields] : clear all fields [] : No [FreeTextEntry3] : 90 [FreeTextEntry5] : 8:12 [FreeTextEntry9] : 9:51 [FreeTextEntry7] : 8:21 [de-identified] : 108 [de-identified] : 10:00

## 2019-12-06 NOTE — ADDENDUM
[FreeTextEntry1] : PT ARRIVED VIA WHEEL CHAIR FROM [facility]\par ALL VITALS WITHIN PARAMETERS FOR HBOT\par DRAINAGE ASSESSED BY RN PRIOR TO DESCENT. DRESSING CHANGE TO FOLLOW TX\par PT DESCENT TO 2.0 RIVAS@ 1.75 PSI/MIN IN CHAMBER 1 WAS WITHOUT INCIDENT. PT RESTING AT DEPTH. CHEST RISE AND FALL OBSERVED\par PT ASCENT FROM TX DEPTH WAS WITHOUT INCIDENT. PT TOLERATED HBOT WELL\par DRESSING CHANGED PRIOR TO LEAVING UNIT\par \par CHT MO GUERRERO 12/06/19 10:22

## 2019-12-07 DIAGNOSIS — E10.621 TYPE 1 DIABETES MELLITUS WITH FOOT ULCER: ICD-10-CM

## 2019-12-07 DIAGNOSIS — L97.422 NON-PRESSURE CHRONIC ULCER OF LEFT HEEL AND MIDFOOT WITH FAT LAYER EXPOSED: ICD-10-CM

## 2019-12-07 DIAGNOSIS — Z79.4 LONG TERM (CURRENT) USE OF INSULIN: ICD-10-CM

## 2019-12-07 NOTE — PROCEDURE
[Outpatient] : Outpatient [Wheelchair] : wheelchair [THIS CHAMBER HAS BEEN CLEANED / DISINFECTED] : This chamber has been cleaned / disinfected according to local and hospital policy and procedure prior to this treatment. [Patient educated on the risks of SMOKING prior to HBOT with understanding] : Patient educated on the risks of SMOKING prior to HBOT with understanding [Patient educated on the risks of CONSUMING ALCOHOL prior to HBOT with understanding] : Patient educated on the risks of CONSUMING ALCOHOL prior to HBOT with understanding [Patient demonstrated and verbalized proper technique for using air break mask] : Patient demonstrated and verbalized proper technique for using air break mask [100% Cotton] : 100% cotton [Empty all pockets] : empty all pockets [No make-up, creams] : no make-up, creams  [Pre tx medications] : pre tx medications  [No hair oils, wigs, hairpieces, pins] : no hair oils, wigs, hairpieces, pins  [No jewelry] : no jewelry  [No matches, cigarettes, lighters] : no matches, cigarettes, lighters  [Hearing aid removed] : hearing aid removed [Ground bracelet on pt's wrist] : ground bracelet on pt's wrist  [Dentures removed] : dentures removed [No reading material] : no reading material  [Remove nail polish] : remove nail polish  [Contacts removed] : contacts removed  [Ground Wire - VISUAL Verification - Intact/Free of Obstruction] : Ground Wire - VISUAL Verification - Intact/Free of Obstruction  [Bra, undergarments removed] : bra, undergarments removed  [No contraindicated dressings] : no contraindicated dressings [Ground Continuity - Verified < 1 ohm w/ Wrist Strap Barb] : Ground Continuity - Verified < 1 ohm w/ Wrist Strap Barb [Number: ___] : Number: [unfilled] [Diagnosis: ___] : Diagnosis: [unfilled] [____] : Post-Dive: Time - [unfilled] [___] : Post-Dive: Value - [unfilled] mg/dL [Clear all fields] : clear all fields [FreeTextEntry3] : 90 [] : No [FreeTextEntry5] : 8647 [FreeTextEntry9] : 2772 [FreeTextEntry7] : 4184 [de-identified] : 108 min [de-identified] : 0233

## 2019-12-07 NOTE — ADDENDUM
[FreeTextEntry1] : Drainage cleared to be changed tomorrow by RN. Pt's foot offloaded using wedge. \par Pt descended to 2.0 RIVAS @ 1.75 PSI/min without incident in chamber #1.\par Pt resting @ depth with chest rise and fall observed throughout tx. \par Pt ascended from 2.0 RIVAS @ 1.75 PSI/min without incident. \par Pt tolerated tx well.

## 2019-12-09 ENCOUNTER — APPOINTMENT (OUTPATIENT)
Dept: HYPERBARIC MEDICINE | Facility: HOSPITAL | Age: 44
End: 2019-12-09
Payer: COMMERCIAL

## 2019-12-09 ENCOUNTER — OUTPATIENT (OUTPATIENT)
Dept: OUTPATIENT SERVICES | Facility: HOSPITAL | Age: 44
LOS: 1 days | Discharge: HOME | End: 2019-12-09
Payer: COMMERCIAL

## 2019-12-09 VITALS
BODY MASS INDEX: 26.68 KG/M2 | DIASTOLIC BLOOD PRESSURE: 82 MMHG | OXYGEN SATURATION: 100 % | SYSTOLIC BLOOD PRESSURE: 144 MMHG | HEART RATE: 75 BPM | WEIGHT: 170 LBS | RESPIRATION RATE: 16 BRPM | TEMPERATURE: 97.2 F | HEIGHT: 67 IN

## 2019-12-09 VITALS
HEART RATE: 75 BPM | TEMPERATURE: 97.2 F | OXYGEN SATURATION: 100 % | RESPIRATION RATE: 16 BRPM | DIASTOLIC BLOOD PRESSURE: 65 MMHG | SYSTOLIC BLOOD PRESSURE: 114 MMHG

## 2019-12-09 DIAGNOSIS — Z79.4 LONG TERM (CURRENT) USE OF INSULIN: ICD-10-CM

## 2019-12-09 DIAGNOSIS — E11.621 TYPE 2 DIABETES MELLITUS WITH FOOT ULCER: ICD-10-CM

## 2019-12-09 DIAGNOSIS — E10.621 TYPE 1 DIABETES MELLITUS WITH FOOT ULCER: ICD-10-CM

## 2019-12-09 DIAGNOSIS — L97.422 NON-PRESSURE CHRONIC ULCER OF LEFT HEEL AND MIDFOOT WITH FAT LAYER EXPOSED: ICD-10-CM

## 2019-12-09 DIAGNOSIS — I77.0 ARTERIOVENOUS FISTULA, ACQUIRED: Chronic | ICD-10-CM

## 2019-12-09 PROCEDURE — 99183 HYPERBARIC OXYGEN THERAPY: CPT

## 2019-12-09 PROCEDURE — G0277: CPT

## 2019-12-09 PROCEDURE — 82962 GLUCOSE BLOOD TEST: CPT

## 2019-12-10 ENCOUNTER — APPOINTMENT (OUTPATIENT)
Dept: HYPERBARIC MEDICINE | Facility: HOSPITAL | Age: 44
End: 2019-12-10

## 2019-12-10 ENCOUNTER — OUTPATIENT (OUTPATIENT)
Dept: OUTPATIENT SERVICES | Facility: HOSPITAL | Age: 44
LOS: 1 days | Discharge: HOME | End: 2019-12-10
Payer: COMMERCIAL

## 2019-12-10 ENCOUNTER — APPOINTMENT (OUTPATIENT)
Dept: PODIATRY | Facility: HOSPITAL | Age: 44
End: 2019-12-10
Payer: COMMERCIAL

## 2019-12-10 VITALS
HEIGHT: 67 IN | DIASTOLIC BLOOD PRESSURE: 70 MMHG | BODY MASS INDEX: 26.68 KG/M2 | RESPIRATION RATE: 16 BRPM | OXYGEN SATURATION: 98 % | WEIGHT: 170 LBS | TEMPERATURE: 98.1 F | HEART RATE: 78 BPM | SYSTOLIC BLOOD PRESSURE: 120 MMHG

## 2019-12-10 DIAGNOSIS — E11.621 TYPE 2 DIABETES MELLITUS WITH FOOT ULCER: ICD-10-CM

## 2019-12-10 DIAGNOSIS — I77.0 ARTERIOVENOUS FISTULA, ACQUIRED: Chronic | ICD-10-CM

## 2019-12-10 LAB
GRAM STN FLD: SIGNIFICANT CHANGE UP
SPECIMEN SOURCE: SIGNIFICANT CHANGE UP

## 2019-12-10 PROCEDURE — 87070 CULTURE OTHR SPECIMN AEROBIC: CPT

## 2019-12-10 PROCEDURE — 11043 DBRDMT MUSC&/FSCA 1ST 20/<: CPT

## 2019-12-10 NOTE — ADDENDUM
[FreeTextEntry1] : DRAINAGE NOTED ON PT'S DRESSING PRIOR TO DESCENT. CHRN NOTIFIED. PT CLEARED TO DIVE.\par \par PT DESCENDED TO 2.0 RIVAS @ 1.75 PSI/MIN WITHOUT INCIDENT IN CHAMBER # 1. PT'S RESTING AT TX DEPTH WITH CHEST RISE AND FALL OBSERVED CHAMBERSIDE.\par \par PT TO RECEIVE WC POST HBOT.\par \par PT ASCENDED FROM 2.0 RIVAS @ 1.75 PSI/MIN WITHOUT INCIDENT. PT TOLERATED TX WELL.\par PT INFORMED OF ASSESSMENT TOMORROW (12/10/2019) WITH APPROPRIATE ARRIVAL TIME.

## 2019-12-10 NOTE — PROCEDURE
[Outpatient] : Outpatient [Wheelchair] : wheelchair [Ambulatory] : Patient is ambulatory. [THIS CHAMBER HAS BEEN CLEANED / DISINFECTED] : This chamber has been cleaned / disinfected according to local and hospital policy and procedure prior to this treatment. [Patient educated on the risks of CONSUMING ALCOHOL prior to HBOT with understanding] : Patient educated on the risks of CONSUMING ALCOHOL prior to HBOT with understanding [Patient demonstrated and verbalized proper technique for using air break mask] : Patient demonstrated and verbalized proper technique for using air break mask [Patient educated on the risks of SMOKING prior to HBOT with understanding] : Patient educated on the risks of SMOKING prior to HBOT with understanding [No hair oils, wigs, hairpieces, pins] : no hair oils, wigs, hairpieces, pins  [100% Cotton] : 100% cotton [Empty all pockets] : empty all pockets [No make-up, creams] : no make-up, creams  [No jewelry] : no jewelry  [Pre tx medications] : pre tx medications  [No matches, cigarettes, lighters] : no matches, cigarettes, lighters  [Hearing aid removed] : hearing aid removed [Ground bracelet on pt's wrist] : ground bracelet on pt's wrist  [Dentures removed] : dentures removed [No reading material] : no reading material  [Remove nail polish] : remove nail polish  [Contacts removed] : contacts removed  [No contraindicated dressings] : no contraindicated dressings [Bra, undergarments removed] : bra, undergarments removed  [Ground Wire - VISUAL Verification - Intact/Free of Obstruction] : Ground Wire - VISUAL Verification - Intact/Free of Obstruction  [Ground Continuity - Verified < 1 ohm w/ Wrist Strap Barb] : Ground Continuity - Verified < 1 ohm w/ Wrist Strap Barb [Number: ___] : Number: [unfilled] [Diagnosis: ___] : Diagnosis: [unfilled] [____] : Post-Dive: Time - [unfilled] [___] : Post-Dive: Value - [unfilled] mg/dL [Clear all fields] : clear all fields [] : No [FreeTextEntry3] : 90 [FreeTextEntry5] : 7493 [FreeTextEntry7] : 2316 [FreeTextEntry9] : 6046 [de-identified] : 0941 [de-identified] : 108 MIN

## 2019-12-11 ENCOUNTER — APPOINTMENT (OUTPATIENT)
Dept: HYPERBARIC MEDICINE | Facility: HOSPITAL | Age: 44
End: 2019-12-11
Payer: COMMERCIAL

## 2019-12-11 ENCOUNTER — OUTPATIENT (OUTPATIENT)
Dept: OUTPATIENT SERVICES | Facility: HOSPITAL | Age: 44
LOS: 1 days | Discharge: HOME | End: 2019-12-11
Payer: COMMERCIAL

## 2019-12-11 VITALS
SYSTOLIC BLOOD PRESSURE: 117 MMHG | RESPIRATION RATE: 20 BRPM | DIASTOLIC BLOOD PRESSURE: 68 MMHG | HEART RATE: 75 BPM | OXYGEN SATURATION: 100 % | TEMPERATURE: 97 F

## 2019-12-11 VITALS
HEART RATE: 69 BPM | TEMPERATURE: 97.2 F | SYSTOLIC BLOOD PRESSURE: 168 MMHG | OXYGEN SATURATION: 100 % | DIASTOLIC BLOOD PRESSURE: 64 MMHG | RESPIRATION RATE: 18 BRPM

## 2019-12-11 DIAGNOSIS — I77.0 ARTERIOVENOUS FISTULA, ACQUIRED: Chronic | ICD-10-CM

## 2019-12-11 DIAGNOSIS — E11.621 TYPE 2 DIABETES MELLITUS WITH FOOT ULCER: ICD-10-CM

## 2019-12-11 PROCEDURE — 82962 GLUCOSE BLOOD TEST: CPT

## 2019-12-11 PROCEDURE — 99183 HYPERBARIC OXYGEN THERAPY: CPT

## 2019-12-11 PROCEDURE — G0277: CPT

## 2019-12-11 NOTE — REVIEW OF SYSTEMS
[Fever] : no fever [Chills] : no chills [Loss Of Hearing] : no hearing loss [Wheezing] : no wheezing [Shortness Of Breath] : no shortness of breath [Abdominal Pain] : no abdominal pain [Vomiting] : no vomiting [Skin Wound] : skin wound [Joint Stiffness] : joint stiffness [Anxiety] : no anxiety [Easy Bleeding] : no tendency for easy bleeding [Easy Bruising] : no tendency for easy bruising [de-identified] : IDDM with neuropathy  [de-identified] : DFU 3 posterior left heel  [de-identified] : DEE DEE

## 2019-12-11 NOTE — PHYSICAL EXAM
[4 x 4] : 4 x 4  [0] : left 0 [1+] : right 1+ [Skin Ulcer] : ulcer [Petechiae] : no petechiae [Purpura] : no purpura  [Alert] : alert [Skin Induration] : induration [Oriented to Place] : oriented to place [Calm] : calm [Oriented to Person] : oriented to person [de-identified] : hammer toes  [de-identified] : comfortable  [de-identified] : Diabetic neuropathy  [de-identified] : DFU 3 posterior left heel  [FreeTextEntry1] : Heel  [FreeTextEntry3] : 1.5 [FreeTextEntry2] : 1.8 [de-identified] : Serosanguineous  [FreeTextEntry4] : 0.3 [de-identified] : Callus [FreeTextEntry5] : 0 [de-identified] : Post Debridement measurement 2.0 x 3.0 x 0.2 [de-identified] : GOMEZ, Iodosorb,DD [TWNoteComboBox1] : Left [TWNoteComboBox5] : No [de-identified] : Tissue culture obtained [TWNoteComboBox4] : Small [de-identified] : Normal [TWNoteComboBox6] : Diabetic [de-identified] : No [de-identified] : 50% [de-identified] : 50% [de-identified] : Mild [TWNoteComboBox7] : Isabela [de-identified] : No [de-identified] : Ace wraps [de-identified] : Debridement performed of all devitalized tissue to bleeding viable tissue [de-identified] : 3x Weekly [de-identified] : Secondary Dressing

## 2019-12-11 NOTE — ASSESSMENT
[Patient undergoing HBO treatment for __________] : Patient undergoing HBO treatment for [unfilled] [Patient descended without problem for 9 minutes] : Patient descended without problem for 9 minutes [No dizziness or thirst] :  No dizziness or thirst [No ear problems] : No ear problems [Vital signs stable] : Vital signs stable [Tolerating dive well] : Tolerating dive well [No Chest Pain, shortness of breath] : No Chest Pain, shortness of breath [Respiratory Rate Stable] : Respiratory Rate Stable [No chest pain, shortness of breath, or ear pain] :  No chest pain, shortness of breath, or ear pain  [Tolerated Ascent well] : Tolerated Ascent well [Vital Signs stable] : Vital Signs stable [No] : No [A physician was present throughout the entire HBOT] : A physician was present throughout the entire HBOT [Continue Treatment Plan] : Continue treatment plan [Clinically Stable] : Clinically stable [0] : 0 out of 10

## 2019-12-11 NOTE — ASSESSMENT
[Verbal] : Verbal [Written] : Written [Patient] : Patient [Good - alert, interested, motivated] : Good - alert, interested, motivated [Dressing changes] : dressing changes [Verbalizes knowledge/Understanding] : Verbalizes knowledge/understanding [Foot Care] : foot care [Pressure relief] : pressure relief [Skin Care] : skin care [Signs and symptoms of infection] : sign and symptoms of infection [Nutrition] : nutrition [How and When to Call] : how and when to call [Hyperbaric Therapy] : hyperbaric therapy [Compression Therapy] : compression therapy [Patient responsibility to plan of care] : patient responsibility to plan of care [Home] : Home [Stable] : stable [Not Applicable - Long Term Care/Home Health Agency] : Long Term Care/Home Health Agency: Not Applicable [Wheelchair] : Wheelchair [] : No [FreeTextEntry2] : Infection prevention\par Offloading/ pressure relief\par Restoration of skin integrity  \par Glycemic control \par  [FreeTextEntry4] : No signs/symptoms of infection. \par Pt has completed 12/30 HBOT tx pt to follow up to [facility] daily for HBOT \par Tissue culture obtained and submitted to lab \par Auth submitted for a second debridement per DPM recommendation \par DPM will evaluate for Apligraf on next visit

## 2019-12-11 NOTE — PROCEDURE
[Wheelchair] : wheelchair [Outpatient] : Outpatient [THIS CHAMBER HAS BEEN CLEANED / DISINFECTED] : This chamber has been cleaned / disinfected according to local and hospital policy and procedure prior to this treatment. [Patient demonstrated and verbalized proper technique for using air break mask] : Patient demonstrated and verbalized proper technique for using air break mask [Patient educated on the risks of SMOKING prior to HBOT with understanding] : Patient educated on the risks of SMOKING prior to HBOT with understanding [Patient educated on the risks of CONSUMING ALCOHOL prior to HBOT with understanding] : Patient educated on the risks of CONSUMING ALCOHOL prior to HBOT with understanding [100% Cotton] : 100% cotton [Empty all pockets] : empty all pockets [No hair oils, wigs, hairpieces, pins] : no hair oils, wigs, hairpieces, pins  [Pre tx medications] : pre tx medications  [No make-up, creams] : no make-up, creams  [No jewelry] : no jewelry  [No matches, cigarettes, lighters] : no matches, cigarettes, lighters  [Contacts removed] : contacts removed  [Remove nail polish] : remove nail polish  [No reading material] : no reading material  [Bra, undergarments removed] : bra, undergarments removed  [No contraindicated dressings] : no contraindicated dressings [Ground Wire - VISUAL Verification - Intact/Free of Obstruction] : Ground Wire - VISUAL Verification - Intact/Free of Obstruction  [Ground Continuity - Verified < 1 ohm w/ Wrist Strap Barb] : Ground Continuity - Verified < 1 ohm w/ Wrist Strap Barb [Number: ___] : Number: [unfilled] [Diagnosis: ___] : Diagnosis: [unfilled] [____] : Post-Dive: Time - [unfilled] [___] : Post-Dive: Value - [unfilled] mg/dL [Hearing aid removed] : hearing aid removed [Dentures removed] : dentures removed [Ground bracelet on pt's wrist] : ground bracelet on pt's wrist  [Clear all fields] : clear all fields [] : No [FreeTextEntry5] : 8:24 [FreeTextEntry1] : tx depth  [FreeTextEntry7] : 8:33 [FreeTextEntry3] : 90 [de-identified] : 108 min  [FreeTextEntry9] : 10:03 [de-identified] : 10:12

## 2019-12-11 NOTE — PROCEDURE
[Betadine] : betadine [Fibrotic] : fibrotic [Necrotic] : necrotic [Scalpel] : scalpel [Slough] : slough [Fat] : fat [Sharp scissors] : sharp scissors [Skin] : skin [Sent to Lab] : which was entirely removed and was sent to the laboratory for [Fascia] : fascia [Subcutaneous tissue] : subcutaneous tissue [Pink] : pink [Culture and Sensitivity] : culture and sensitivity [Clean] : clean [Pressure] : pressure [AgNo3 Cauterization] : AgNo3 cauterization [FreeTextEntry2] : DFU left posterior heel [FreeTextEntry1] : iodosorb [FreeTextEntry9] : 5925 [] : Yes [de-identified] : DFU left heel [de-identified] : n/a  [de-identified] : Dr.John Guidry [de-identified] : n/a [FreeTextEntry6] : DFU left heel [de-identified] : 2cc  [de-identified] : n/a  [de-identified] : Necrotic tissue  [FreeTextEntry7] : DFU left heel

## 2019-12-11 NOTE — ADDENDUM
[FreeTextEntry1] : Pt arrived via wheel chair from residence A&Ox3\par All vitals within parameters for hbot\par Contraindicated dressing changed prior to hbot by RN.\par Pt descent to 2.0 martín@ 1.75 psi/min in chamber 1 was without incident.\par Pt ascended from depth without incident. \par Pt tolerated tx well.\par Pt receive ace wrap post HBOT by RN.\par \par

## 2019-12-12 ENCOUNTER — APPOINTMENT (OUTPATIENT)
Dept: HYPERBARIC MEDICINE | Facility: HOSPITAL | Age: 44
End: 2019-12-12
Payer: COMMERCIAL

## 2019-12-12 ENCOUNTER — OUTPATIENT (OUTPATIENT)
Dept: OUTPATIENT SERVICES | Facility: HOSPITAL | Age: 44
LOS: 1 days | Discharge: HOME | End: 2019-12-12
Payer: COMMERCIAL

## 2019-12-12 VITALS
RESPIRATION RATE: 16 BRPM | SYSTOLIC BLOOD PRESSURE: 124 MMHG | OXYGEN SATURATION: 100 % | DIASTOLIC BLOOD PRESSURE: 71 MMHG | HEART RATE: 77 BPM | TEMPERATURE: 97.6 F

## 2019-12-12 VITALS
HEART RATE: 69 BPM | RESPIRATION RATE: 18 BRPM | TEMPERATURE: 97.3 F | OXYGEN SATURATION: 100 % | SYSTOLIC BLOOD PRESSURE: 182 MMHG | DIASTOLIC BLOOD PRESSURE: 87 MMHG

## 2019-12-12 DIAGNOSIS — N19 UNSPECIFIED KIDNEY FAILURE: ICD-10-CM

## 2019-12-12 DIAGNOSIS — E10.621 TYPE 1 DIABETES MELLITUS WITH FOOT ULCER: ICD-10-CM

## 2019-12-12 DIAGNOSIS — Z79.899 OTHER LONG TERM (CURRENT) DRUG THERAPY: ICD-10-CM

## 2019-12-12 DIAGNOSIS — L97.422 NON-PRESSURE CHRONIC ULCER OF LEFT HEEL AND MIDFOOT WITH FAT LAYER EXPOSED: ICD-10-CM

## 2019-12-12 DIAGNOSIS — Z89.429 ACQUIRED ABSENCE OF OTHER TOE(S), UNSPECIFIED SIDE: ICD-10-CM

## 2019-12-12 DIAGNOSIS — Z79.4 LONG TERM (CURRENT) USE OF INSULIN: ICD-10-CM

## 2019-12-12 DIAGNOSIS — Z98.49 CATARACT EXTRACTION STATUS, UNSPECIFIED EYE: ICD-10-CM

## 2019-12-12 DIAGNOSIS — I77.0 ARTERIOVENOUS FISTULA, ACQUIRED: Chronic | ICD-10-CM

## 2019-12-12 DIAGNOSIS — L97.423 NON-PRESSURE CHRONIC ULCER OF LEFT HEEL AND MIDFOOT WITH NECROSIS OF MUSCLE: ICD-10-CM

## 2019-12-12 DIAGNOSIS — E11.621 TYPE 2 DIABETES MELLITUS WITH FOOT ULCER: ICD-10-CM

## 2019-12-12 DIAGNOSIS — E10.29 TYPE 1 DIABETES MELLITUS WITH OTHER DIABETIC KIDNEY COMPLICATION: ICD-10-CM

## 2019-12-12 DIAGNOSIS — Z99.2 DEPENDENCE ON RENAL DIALYSIS: ICD-10-CM

## 2019-12-12 PROCEDURE — 82962 GLUCOSE BLOOD TEST: CPT

## 2019-12-12 PROCEDURE — G0277: CPT

## 2019-12-12 PROCEDURE — 99183 HYPERBARIC OXYGEN THERAPY: CPT

## 2019-12-12 NOTE — ADDENDUM
[FreeTextEntry1] : Prior to dive, drainage noticed on pt's dressing. RN notified. Pt clear to dive. Pt to receive wound care post HBOT.\par Pt descended to depth without incident in chamber # 1.\par Pt is resting @ depth with chest rise and fall observed @ chamber side.\par Pt ascended from 2.0 RIVAS @ 1.75 PSI/min without incident.\par Pt tolerated tx well. Pt received wound care post HBO tx.

## 2019-12-12 NOTE — PROCEDURE
[Outpatient] : Outpatient [Wheelchair] : wheelchair [THIS CHAMBER HAS BEEN CLEANED / DISINFECTED] : This chamber has been cleaned / disinfected according to local and hospital policy and procedure prior to this treatment. [Patient demonstrated and verbalized proper technique for using air break mask] : Patient demonstrated and verbalized proper technique for using air break mask [Patient educated on the risks of SMOKING prior to HBOT with understanding] : Patient educated on the risks of SMOKING prior to HBOT with understanding [Patient educated on the risks of CONSUMING ALCOHOL prior to HBOT with understanding] : Patient educated on the risks of CONSUMING ALCOHOL prior to HBOT with understanding [Pre tx medications] : pre tx medications  [No hair oils, wigs, hairpieces, pins] : no hair oils, wigs, hairpieces, pins  [Empty all pockets] : empty all pockets [100% Cotton] : 100% cotton [No matches, cigarettes, lighters] : no matches, cigarettes, lighters  [No make-up, creams] : no make-up, creams  [No jewelry] : no jewelry  [Dentures removed] : dentures removed [Hearing aid removed] : hearing aid removed [Contacts removed] : contacts removed  [Remove nail polish] : remove nail polish  [Ground bracelet on pt's wrist] : ground bracelet on pt's wrist  [No reading material] : no reading material  [No contraindicated dressings] : no contraindicated dressings [Bra, undergarments removed] : bra, undergarments removed  [Ground Wire - VISUAL Verification - Intact/Free of Obstruction] : Ground Wire - VISUAL Verification - Intact/Free of Obstruction  [Ground Continuity - Verified < 1 ohm w/ Wrist Strap Barb] : Ground Continuity - Verified < 1 ohm w/ Wrist Strap Barb [Number: ___] : Number: [unfilled] [Diagnosis: ___] : Diagnosis: [unfilled] [____] : Post-Dive: Time - [unfilled] [___] : Post-Dive: Value - [unfilled] mg/dL [Clear all fields] : clear all fields [FreeTextEntry3] : 90 [] : No [FreeTextEntry5] : 0837 [FreeTextEntry7] : 5577 [FreeTextEntry9] : 277 [de-identified] : 0986 [de-identified] : 108 min

## 2019-12-12 NOTE — ASSESSMENT
[No change from previous assessment] : No change from previous assessment [Patient prepared for dive] : Patient prepared for dive [Patient undergoing HBO treatment for __________] : Patient undergoing HBO treatment for [unfilled] [Patient descended without problem for 9 minutes] : Patient descended without problem for 9 minutes [No dizziness or thirst] :  No dizziness or thirst [No ear problems] : No ear problems [Vital signs stable] : Vital signs stable [Tolerating dive well] : Tolerating dive well [No Chest Pain, shortness of breath] : No Chest Pain, shortness of breath [No chest pain, shortness of breath, or ear pain] :  No chest pain, shortness of breath, or ear pain  [Respiratory Rate Stable] : Respiratory Rate Stable [Vital Signs stable] : Vital Signs stable [Tolerated Ascent well] : Tolerated Ascent well [No] : No [Clinically Stable] : Clinically stable [A physician was present throughout the entire HBOT] : A physician was present throughout the entire HBOT [0] : 0 out of 10 [Continue Treatment Plan] : Continue treatment plan [FreeTextEntry2] : None

## 2019-12-13 ENCOUNTER — OUTPATIENT (OUTPATIENT)
Dept: OUTPATIENT SERVICES | Facility: HOSPITAL | Age: 44
LOS: 1 days | Discharge: HOME | End: 2019-12-13
Payer: COMMERCIAL

## 2019-12-13 ENCOUNTER — APPOINTMENT (OUTPATIENT)
Dept: HYPERBARIC MEDICINE | Facility: HOSPITAL | Age: 44
End: 2019-12-13
Payer: COMMERCIAL

## 2019-12-13 VITALS
OXYGEN SATURATION: 100 % | DIASTOLIC BLOOD PRESSURE: 70 MMHG | RESPIRATION RATE: 20 BRPM | SYSTOLIC BLOOD PRESSURE: 116 MMHG | HEART RATE: 79 BPM | TEMPERATURE: 97.7 F

## 2019-12-13 DIAGNOSIS — I77.0 ARTERIOVENOUS FISTULA, ACQUIRED: Chronic | ICD-10-CM

## 2019-12-13 DIAGNOSIS — E11.621 TYPE 2 DIABETES MELLITUS WITH FOOT ULCER: ICD-10-CM

## 2019-12-13 DIAGNOSIS — L97.422 NON-PRESSURE CHRONIC ULCER OF LEFT HEEL AND MIDFOOT WITH FAT LAYER EXPOSED: ICD-10-CM

## 2019-12-13 DIAGNOSIS — E10.621 TYPE 1 DIABETES MELLITUS WITH FOOT ULCER: ICD-10-CM

## 2019-12-13 DIAGNOSIS — Z79.4 LONG TERM (CURRENT) USE OF INSULIN: ICD-10-CM

## 2019-12-13 PROCEDURE — G0277: CPT

## 2019-12-13 PROCEDURE — 82962 GLUCOSE BLOOD TEST: CPT

## 2019-12-13 PROCEDURE — 99183 HYPERBARIC OXYGEN THERAPY: CPT

## 2019-12-13 NOTE — PROCEDURE
[Outpatient] : Outpatient [Wheelchair] : wheelchair [THIS CHAMBER HAS BEEN CLEANED / DISINFECTED] : This chamber has been cleaned / disinfected according to local and hospital policy and procedure prior to this treatment. [Patient demonstrated and verbalized proper technique for using air break mask] : Patient demonstrated and verbalized proper technique for using air break mask [Patient educated on the risks of SMOKING prior to HBOT with understanding] : Patient educated on the risks of SMOKING prior to HBOT with understanding [Patient educated on the risks of CONSUMING ALCOHOL prior to HBOT with understanding] : Patient educated on the risks of CONSUMING ALCOHOL prior to HBOT with understanding [100% Cotton] : 100% cotton [Empty all pockets] : empty all pockets [No hair oils, wigs, hairpieces, pins] : no hair oils, wigs, hairpieces, pins  [Pre tx medications] : pre tx medications  [No make-up, creams] : no make-up, creams  [No jewelry] : no jewelry  [Hearing aid removed] : hearing aid removed [No matches, cigarettes, lighters] : no matches, cigarettes, lighters  [Ground bracelet on pt's wrist] : ground bracelet on pt's wrist  [Dentures removed] : dentures removed [Contacts removed] : contacts removed  [Remove nail polish] : remove nail polish  [No reading material] : no reading material  [Bra, undergarments removed] : bra, undergarments removed  [No contraindicated dressings] : no contraindicated dressings [Ground Wire - VISUAL Verification - Intact/Free of Obstruction] : Ground Wire - VISUAL Verification - Intact/Free of Obstruction  [Ground Continuity - Verified < 1 ohm w/ Wrist Strap Barb] : Ground Continuity - Verified < 1 ohm w/ Wrist Strap Barb [Number: ___] : Number: [unfilled] [Diagnosis: ___] : Diagnosis: [unfilled] [____] : Post-Dive: Time - [unfilled] [___] : Post-Dive: Value - [unfilled] mg/dL [Clear all fields] : clear all fields [] : No [FreeTextEntry1] : Tx Depth  [FreeTextEntry3] : 90 [FreeTextEntry7] : 8:13 [FreeTextEntry5] : 8:04 [FreeTextEntry9] : 9:43 [de-identified] : 108 min  [de-identified] : 9:52

## 2019-12-13 NOTE — ADDENDUM
[FreeTextEntry1] : PT ARRIVED VIA WHEEL CHAIR A&O X3.\par NO DRAINAGE NOTED ON DRESSING. WOUND CARE TO FOLLOW HBOT\par ALL VITALS WITHIN PARAMETERS FOR HBOT.\par Pt descended to depth without incident in chamber # 1.\par Pt is resting @ depth with chest rise and fall observed @ chamber side.\par Pt ascended from depth without incident. \par Pt tolerated tx well\par Pt receive wound care post HBOT by RN.\par

## 2019-12-13 NOTE — ASSESSMENT
[No change from previous assessment] : No change from previous assessment [Patient prepared for dive] : Patient prepared for dive [Patient descended without problem for 9 minutes] : Patient descended without problem for 9 minutes [Patient undergoing HBO treatment for __________] : Patient undergoing HBO treatment for [unfilled] [No dizziness or thirst] :  No dizziness or thirst [No ear problems] : No ear problems [Vital signs stable] : Vital signs stable [Tolerating dive well] : Tolerating dive well [No Chest Pain, shortness of breath] : No Chest Pain, shortness of breath [Respiratory Rate Stable] : Respiratory Rate Stable [No chest pain, shortness of breath, or ear pain] :  No chest pain, shortness of breath, or ear pain  [Vital Signs stable] : Vital Signs stable [Tolerated Ascent well] : Tolerated Ascent well [A physician was present throughout the entire HBOT] : A physician was present throughout the entire HBOT [No] : No [Clinically Stable] : Clinically stable [Continue Treatment Plan] : Continue treatment plan [0] : 0 out of 10 [FreeTextEntry2] : None

## 2019-12-15 LAB
CULTURE RESULTS: SIGNIFICANT CHANGE UP
SPECIMEN SOURCE: SIGNIFICANT CHANGE UP

## 2019-12-16 ENCOUNTER — OUTPATIENT (OUTPATIENT)
Dept: OUTPATIENT SERVICES | Facility: HOSPITAL | Age: 44
LOS: 1 days | Discharge: HOME | End: 2019-12-16
Payer: COMMERCIAL

## 2019-12-16 ENCOUNTER — APPOINTMENT (OUTPATIENT)
Dept: HYPERBARIC MEDICINE | Facility: HOSPITAL | Age: 44
End: 2019-12-16
Payer: COMMERCIAL

## 2019-12-16 VITALS
SYSTOLIC BLOOD PRESSURE: 159 MMHG | TEMPERATURE: 98 F | RESPIRATION RATE: 20 BRPM | DIASTOLIC BLOOD PRESSURE: 83 MMHG | HEART RATE: 77 BPM | OXYGEN SATURATION: 100 %

## 2019-12-16 VITALS
TEMPERATURE: 97.5 F | OXYGEN SATURATION: 100 % | RESPIRATION RATE: 18 BRPM | DIASTOLIC BLOOD PRESSURE: 81 MMHG | SYSTOLIC BLOOD PRESSURE: 176 MMHG | HEART RATE: 18 BPM

## 2019-12-16 VITALS
HEART RATE: 80 BPM | DIASTOLIC BLOOD PRESSURE: 78 MMHG | SYSTOLIC BLOOD PRESSURE: 138 MMHG | RESPIRATION RATE: 20 BRPM | TEMPERATURE: 98.7 F | OXYGEN SATURATION: 100 %

## 2019-12-16 DIAGNOSIS — E10.621 TYPE 1 DIABETES MELLITUS WITH FOOT ULCER: ICD-10-CM

## 2019-12-16 DIAGNOSIS — L97.422 NON-PRESSURE CHRONIC ULCER OF LEFT HEEL AND MIDFOOT WITH FAT LAYER EXPOSED: ICD-10-CM

## 2019-12-16 DIAGNOSIS — E11.621 TYPE 2 DIABETES MELLITUS WITH FOOT ULCER: ICD-10-CM

## 2019-12-16 DIAGNOSIS — I77.0 ARTERIOVENOUS FISTULA, ACQUIRED: Chronic | ICD-10-CM

## 2019-12-16 DIAGNOSIS — Z79.4 LONG TERM (CURRENT) USE OF INSULIN: ICD-10-CM

## 2019-12-16 PROCEDURE — 82962 GLUCOSE BLOOD TEST: CPT

## 2019-12-16 PROCEDURE — 99183 HYPERBARIC OXYGEN THERAPY: CPT

## 2019-12-16 PROCEDURE — G0277: CPT

## 2019-12-16 NOTE — PROCEDURE
[Outpatient] : Outpatient [Wheelchair] : wheelchair [THIS CHAMBER HAS BEEN CLEANED / DISINFECTED] : This chamber has been cleaned / disinfected according to local and hospital policy and procedure prior to this treatment. [Patient demonstrated and verbalized proper technique for using air break mask] : Patient demonstrated and verbalized proper technique for using air break mask [Patient educated on the risks of SMOKING prior to HBOT with understanding] : Patient educated on the risks of SMOKING prior to HBOT with understanding [Patient educated on the risks of CONSUMING ALCOHOL prior to HBOT with understanding] : Patient educated on the risks of CONSUMING ALCOHOL prior to HBOT with understanding [100% Cotton] : 100% cotton [No hair oils, wigs, hairpieces, pins] : no hair oils, wigs, hairpieces, pins  [Pre tx medications] : pre tx medications  [Empty all pockets] : empty all pockets [No make-up, creams] : no make-up, creams  [Hearing aid removed] : hearing aid removed [No matches, cigarettes, lighters] : no matches, cigarettes, lighters  [No jewelry] : no jewelry  [Dentures removed] : dentures removed [Ground bracelet on pt's wrist] : ground bracelet on pt's wrist  [Remove nail polish] : remove nail polish  [Contacts removed] : contacts removed  [No reading material] : no reading material  [Bra, undergarments removed] : bra, undergarments removed  [No contraindicated dressings] : no contraindicated dressings [Number: ___] : Number: [unfilled] [Diagnosis: ___] : Diagnosis: [unfilled] [____] : Recheck: Time - [unfilled] [___] : Recheck: Value - [unfilled] mg/dL [Clear all fields] : clear all fields [] : No [FreeTextEntry3] : 93 [FreeTextEntry5] : 8:10 /  8:47 [FreeTextEntry9] : 8:23 / 10:26 [FreeTextEntry7] : 8:19 / 8:56 [de-identified] : 8:31 / 10:35 [de-identified] : 120 MIN

## 2019-12-16 NOTE — ASSESSMENT
[Patient undergoing HBO treatment for __________] : Patient undergoing HBO treatment for [unfilled] [Patient descended without problem for 9 minutes] : Patient descended without problem for 9 minutes [No dizziness or thirst] :  No dizziness or thirst [No ear problems] : No ear problems [Vital signs stable] : Vital signs stable [Tolerating dive well] : Tolerating dive well [No Chest Pain, shortness of breath] : No Chest Pain, shortness of breath [Respiratory Rate Stable] : Respiratory Rate Stable [No chest pain, shortness of breath, or ear pain] :  No chest pain, shortness of breath, or ear pain  [Tolerated Ascent well] : Tolerated Ascent well [Vital Signs stable] : Vital Signs stable [A physician was present throughout the entire HBOT] : A physician was present throughout the entire HBOT [No] : No [Clinically Stable] : Clinically stable [0] : 0 out of 10

## 2019-12-16 NOTE — ADDENDUM
[FreeTextEntry1] : PT ARRIVED VIA WHEEL CHAIR FROM [facility] A&OX3\par ALL VITALS WITHIN PARAMETERS FOR HBOT.\par PT DESCENT TO 2.0 RIVAS@ 1.75 PSI/MIN IN CHAMBER 1 WAS WITHOUT INCIDENT.\par AFTER 3 MIN AT DEPTH PT ADVISED THAT HE NEEDED TO USE THE BATHROOM AND COULD NOT CONTINUE HBOT. PT ASCENT FROM 2.0 RIVAS @ 1.75 PSI/MIN WAS WITHOUT INCIDENT\par PT REQUEST TO RESUME HBOT. MD CONSULTED AND CLEARED PT TO RESUME TX. \par PT DESCENT TO 2.0 RIVAS@ 1.75 PSI/MIN WAS WITHOUT INCIDENT. \par PT RESTING AT DEPTH CHEST RISE AND FALL OBSERVED.\par CARE TRANSFERRED TO [facility] TECH UPON ASCENT\par \par PT ASCENDED FROM 2.0 RIVAS @ 1.75 PSI/MIN. PT TOLERATED TX WELL.

## 2019-12-17 ENCOUNTER — APPOINTMENT (OUTPATIENT)
Dept: HYPERBARIC MEDICINE | Facility: HOSPITAL | Age: 44
End: 2019-12-17

## 2019-12-17 ENCOUNTER — OTHER (OUTPATIENT)
Age: 44
End: 2019-12-17

## 2019-12-18 ENCOUNTER — OTHER (OUTPATIENT)
Age: 44
End: 2019-12-18

## 2019-12-18 ENCOUNTER — APPOINTMENT (OUTPATIENT)
Dept: HYPERBARIC MEDICINE | Facility: HOSPITAL | Age: 44
End: 2019-12-18
Payer: COMMERCIAL

## 2019-12-18 ENCOUNTER — OUTPATIENT (OUTPATIENT)
Dept: OUTPATIENT SERVICES | Facility: HOSPITAL | Age: 44
LOS: 1 days | Discharge: HOME | End: 2019-12-18
Payer: COMMERCIAL

## 2019-12-18 VITALS
TEMPERATURE: 97.2 F | HEART RATE: 77 BPM | SYSTOLIC BLOOD PRESSURE: 111 MMHG | DIASTOLIC BLOOD PRESSURE: 66 MMHG | OXYGEN SATURATION: 98 % | RESPIRATION RATE: 20 BRPM

## 2019-12-18 VITALS
DIASTOLIC BLOOD PRESSURE: 81 MMHG | TEMPERATURE: 97 F | HEART RATE: 69 BPM | OXYGEN SATURATION: 100 % | SYSTOLIC BLOOD PRESSURE: 163 MMHG | RESPIRATION RATE: 18 BRPM

## 2019-12-18 DIAGNOSIS — E10.621 TYPE 1 DIABETES MELLITUS WITH FOOT ULCER: ICD-10-CM

## 2019-12-18 DIAGNOSIS — L97.422 NON-PRESSURE CHRONIC ULCER OF LEFT HEEL AND MIDFOOT WITH FAT LAYER EXPOSED: ICD-10-CM

## 2019-12-18 DIAGNOSIS — E11.621 TYPE 2 DIABETES MELLITUS WITH FOOT ULCER: ICD-10-CM

## 2019-12-18 DIAGNOSIS — I77.0 ARTERIOVENOUS FISTULA, ACQUIRED: Chronic | ICD-10-CM

## 2019-12-18 DIAGNOSIS — Z79.4 LONG TERM (CURRENT) USE OF INSULIN: ICD-10-CM

## 2019-12-18 PROCEDURE — 99183 HYPERBARIC OXYGEN THERAPY: CPT

## 2019-12-18 PROCEDURE — 82962 GLUCOSE BLOOD TEST: CPT

## 2019-12-18 PROCEDURE — G0277: CPT

## 2019-12-19 ENCOUNTER — OUTPATIENT (OUTPATIENT)
Dept: OUTPATIENT SERVICES | Facility: HOSPITAL | Age: 44
LOS: 1 days | Discharge: HOME | End: 2019-12-19
Payer: COMMERCIAL

## 2019-12-19 ENCOUNTER — APPOINTMENT (OUTPATIENT)
Dept: HYPERBARIC MEDICINE | Facility: HOSPITAL | Age: 44
End: 2019-12-19
Payer: COMMERCIAL

## 2019-12-19 VITALS
DIASTOLIC BLOOD PRESSURE: 54 MMHG | OXYGEN SATURATION: 100 % | HEART RATE: 75 BPM | RESPIRATION RATE: 20 BRPM | TEMPERATURE: 96.9 F | SYSTOLIC BLOOD PRESSURE: 89 MMHG

## 2019-12-19 VITALS
RESPIRATION RATE: 18 BRPM | SYSTOLIC BLOOD PRESSURE: 155 MMHG | HEART RATE: 70 BPM | DIASTOLIC BLOOD PRESSURE: 86 MMHG | TEMPERATURE: 97.2 F | OXYGEN SATURATION: 100 %

## 2019-12-19 VITALS — DIASTOLIC BLOOD PRESSURE: 59 MMHG | SYSTOLIC BLOOD PRESSURE: 93 MMHG

## 2019-12-19 DIAGNOSIS — Z79.4 LONG TERM (CURRENT) USE OF INSULIN: ICD-10-CM

## 2019-12-19 DIAGNOSIS — E11.621 TYPE 2 DIABETES MELLITUS WITH FOOT ULCER: ICD-10-CM

## 2019-12-19 DIAGNOSIS — E10.621 TYPE 1 DIABETES MELLITUS WITH FOOT ULCER: ICD-10-CM

## 2019-12-19 DIAGNOSIS — I77.0 ARTERIOVENOUS FISTULA, ACQUIRED: Chronic | ICD-10-CM

## 2019-12-19 DIAGNOSIS — L97.422 NON-PRESSURE CHRONIC ULCER OF LEFT HEEL AND MIDFOOT WITH FAT LAYER EXPOSED: ICD-10-CM

## 2019-12-19 PROCEDURE — G0277: CPT

## 2019-12-19 PROCEDURE — 99183 HYPERBARIC OXYGEN THERAPY: CPT

## 2019-12-19 PROCEDURE — 82962 GLUCOSE BLOOD TEST: CPT

## 2019-12-19 NOTE — ASSESSMENT
[Patient descended without problem for 9 minutes] : Patient descended without problem for 9 minutes [Patient undergoing HBO treatment for __________] : Patient undergoing HBO treatment for [unfilled] [No dizziness or thirst] :  No dizziness or thirst [No ear problems] : No ear problems [Tolerating dive well] : Tolerating dive well [No Chest Pain, shortness of breath] : No Chest Pain, shortness of breath [Vital signs stable] : Vital signs stable [Respiratory Rate Stable] : Respiratory Rate Stable [No chest pain, shortness of breath, or ear pain] :  No chest pain, shortness of breath, or ear pain  [Tolerated Ascent well] : Tolerated Ascent well [A physician was present throughout the entire HBOT] : A physician was present throughout the entire HBOT [Vital Signs stable] : Vital Signs stable [No] : No [Clinically Stable] : Clinically stable [Continue Treatment Plan] : Continue treatment plan [0] : 0 out of 10

## 2019-12-19 NOTE — PROCEDURE
[Outpatient] : Outpatient [Wheelchair] : wheelchair [THIS CHAMBER HAS BEEN CLEANED / DISINFECTED] : This chamber has been cleaned / disinfected according to local and hospital policy and procedure prior to this treatment. [Patient demonstrated and verbalized proper technique for using air break mask] : Patient demonstrated and verbalized proper technique for using air break mask [Patient educated on the risks of SMOKING prior to HBOT with understanding] : Patient educated on the risks of SMOKING prior to HBOT with understanding [Patient educated on the risks of CONSUMING ALCOHOL prior to HBOT with understanding] : Patient educated on the risks of CONSUMING ALCOHOL prior to HBOT with understanding [100% Cotton] : 100% cotton [Empty all pockets] : empty all pockets [Pre tx medications] : pre tx medications  [No hair oils, wigs, hairpieces, pins] : no hair oils, wigs, hairpieces, pins  [No make-up, creams] : no make-up, creams  [No jewelry] : no jewelry  [No matches, cigarettes, lighters] : no matches, cigarettes, lighters  [Hearing aid removed] : hearing aid removed [Dentures removed] : dentures removed [Ground bracelet on pt's wrist] : ground bracelet on pt's wrist  [Contacts removed] : contacts removed  [Remove nail polish] : remove nail polish  [No reading material] : no reading material  [Bra, undergarments removed] : bra, undergarments removed  [No contraindicated dressings] : no contraindicated dressings [Ground Wire - VISUAL Verification - Intact/Free of Obstruction] : Ground Wire - VISUAL Verification - Intact/Free of Obstruction  [Ground Continuity - Verified < 1 ohm w/ Wrist Strap Barb] : Ground Continuity - Verified < 1 ohm w/ Wrist Strap Barb [Number: ___] : Number: [unfilled] [Diagnosis: ___] : Diagnosis: [unfilled] [____] : Post-Dive: Time - [unfilled] [___] : Post-Dive: Value - [unfilled] mg/dL [Clear all fields] : clear all fields [] : No [FreeTextEntry3] : 90 [FreeTextEntry9] : 1007 [FreeTextEntry5] : 5487 [FreeTextEntry7] : 6750 [de-identified] : 1018 [de-identified] : 108 min

## 2019-12-19 NOTE — ADDENDUM
[FreeTextEntry1] : Pt BP low. RN notified. Pt drank some water. Pt BP retested. Pt vital signs within parameters for HBO tx. \par Pt descended to 2.0 RIVAS @ 1.75 PSI/min without incident in chamber #1. \par Pt resting @ depth with chest rise and fall observed throughout tx. \par Pt ascended from 2.0 RIVAS @ 1.75 PSI/min without incident. \par Pt tolerated tx well. Pt received ace wrap post HBO tx. \par

## 2019-12-20 ENCOUNTER — OUTPATIENT (OUTPATIENT)
Dept: OUTPATIENT SERVICES | Facility: HOSPITAL | Age: 44
LOS: 1 days | Discharge: HOME | End: 2019-12-20
Payer: COMMERCIAL

## 2019-12-20 ENCOUNTER — APPOINTMENT (OUTPATIENT)
Dept: HYPERBARIC MEDICINE | Facility: HOSPITAL | Age: 44
End: 2019-12-20
Payer: COMMERCIAL

## 2019-12-20 VITALS
RESPIRATION RATE: 20 BRPM | HEART RATE: 73 BPM | SYSTOLIC BLOOD PRESSURE: 167 MMHG | TEMPERATURE: 97.3 F | OXYGEN SATURATION: 100 % | DIASTOLIC BLOOD PRESSURE: 78 MMHG

## 2019-12-20 VITALS
DIASTOLIC BLOOD PRESSURE: 62 MMHG | HEART RATE: 76 BPM | RESPIRATION RATE: 16 BRPM | TEMPERATURE: 97.1 F | SYSTOLIC BLOOD PRESSURE: 107 MMHG | OXYGEN SATURATION: 100 %

## 2019-12-20 DIAGNOSIS — I77.0 ARTERIOVENOUS FISTULA, ACQUIRED: Chronic | ICD-10-CM

## 2019-12-20 DIAGNOSIS — E11.621 TYPE 2 DIABETES MELLITUS WITH FOOT ULCER: ICD-10-CM

## 2019-12-20 PROCEDURE — G0277: CPT

## 2019-12-20 PROCEDURE — 82962 GLUCOSE BLOOD TEST: CPT

## 2019-12-20 PROCEDURE — 99183 HYPERBARIC OXYGEN THERAPY: CPT

## 2019-12-20 NOTE — PROCEDURE
[Outpatient] : Outpatient [Wheelchair] : wheelchair [Patient demonstrated and verbalized proper technique for using air break mask] : Patient demonstrated and verbalized proper technique for using air break mask [Patient educated on the risks of SMOKING prior to HBOT with understanding] : Patient educated on the risks of SMOKING prior to HBOT with understanding [THIS CHAMBER HAS BEEN CLEANED / DISINFECTED] : This chamber has been cleaned / disinfected according to local and hospital policy and procedure prior to this treatment. [100% Cotton] : 100% cotton [Patient educated on the risks of CONSUMING ALCOHOL prior to HBOT with understanding] : Patient educated on the risks of CONSUMING ALCOHOL prior to HBOT with understanding [No hair oils, wigs, hairpieces, pins] : no hair oils, wigs, hairpieces, pins  [Pre tx medications] : pre tx medications  [Empty all pockets] : empty all pockets [No jewelry] : no jewelry  [No make-up, creams] : no make-up, creams  [Dentures removed] : dentures removed [Hearing aid removed] : hearing aid removed [No matches, cigarettes, lighters] : no matches, cigarettes, lighters  [Contacts removed] : contacts removed  [Remove nail polish] : remove nail polish  [Ground bracelet on pt's wrist] : ground bracelet on pt's wrist  [No reading material] : no reading material  [Ground Wire - VISUAL Verification - Intact/Free of Obstruction] : Ground Wire - VISUAL Verification - Intact/Free of Obstruction  [Bra, undergarments removed] : bra, undergarments removed  [Ground Continuity - Verified < 1 ohm w/ Wrist Strap Barb] : Ground Continuity - Verified < 1 ohm w/ Wrist Strap Barb [Diagnosis: ___] : Diagnosis: [unfilled] [Number: ___] : Number: [unfilled] [____] : Post-Dive: Time - [unfilled] [___] : Post-Dive: Value - [unfilled] mg/dL [Clear all fields] : clear all fields [] : No [FreeTextEntry3] : 90 [FreeTextEntry5] : 8:43 [FreeTextEntry7] : 8:52 [FreeTextEntry9] : 10:22 [de-identified] : 10:31 [de-identified] : 108 min

## 2019-12-20 NOTE — ADDENDUM
[FreeTextEntry1] : PT ARRIVED VIA WHEELCHAIR FROM RESIDENCE A&OX3\par DRAINAGE ASSESSED BY RN PRIOR TO DESCENT. WOUND CARE TO FOLLOW HBOT\par PT DESCENT TO 2.0 RIVAS@ 1.75 PSI/MIN IN CHAMBER 1 WAS WITHOUT INCIDENT\par CARE TRANSFERRED TO TECHNICIAN DURING DESCENT.\par Transfer of care to t prior to ascent. Pt resting at depth chest rise and fall observed.\par Pt ascent was without incident hbot tolerated well\par Dressing changed prior to pt leaving unit\par Pt confirmed hbot on 12/21/19\par \par Cht MO Barrientos 12/20/19 10:26

## 2019-12-20 NOTE — ASSESSMENT
[Patient prepared for dive] : Patient prepared for dive [Patient descended without problem for 9 minutes] : Patient descended without problem for 9 minutes [No dizziness or thirst] :  No dizziness or thirst [Vital signs stable] : Vital signs stable [No ear problems] : No ear problems [Respiratory Rate Stable] : Respiratory Rate Stable [No Chest Pain, shortness of breath] : No Chest Pain, shortness of breath [Tolerating dive well] : Tolerating dive well [No chest pain, shortness of breath, or ear pain] :  No chest pain, shortness of breath, or ear pain  [Tolerated Ascent well] : Tolerated Ascent well [Vital Signs stable] : Vital Signs stable [A physician was present throughout the entire HBOT] : A physician was present throughout the entire HBOT [Clinically Stable] : Clinically stable [No] : No [Continue Treatment Plan] : Continue treatment plan [0] : 0 out of 10

## 2019-12-20 NOTE — PROCEDURE
[Outpatient] : Outpatient [THIS CHAMBER HAS BEEN CLEANED / DISINFECTED] : This chamber has been cleaned / disinfected according to local and hospital policy and procedure prior to this treatment. [Patient demonstrated and verbalized proper technique for using air break mask] : Patient demonstrated and verbalized proper technique for using air break mask [Patient educated on the risks of CONSUMING ALCOHOL prior to HBOT with understanding] : Patient educated on the risks of CONSUMING ALCOHOL prior to HBOT with understanding [Patient educated on the risks of SMOKING prior to HBOT with understanding] : Patient educated on the risks of SMOKING prior to HBOT with understanding [Empty all pockets] : empty all pockets [100% Cotton] : 100% cotton [No hair oils, wigs, hairpieces, pins] : no hair oils, wigs, hairpieces, pins  [Pre tx medications] : pre tx medications  [No make-up, creams] : no make-up, creams  [No jewelry] : no jewelry  [No matches, cigarettes, lighters] : no matches, cigarettes, lighters  [Hearing aid removed] : hearing aid removed [Dentures removed] : dentures removed [Ground bracelet on pt's wrist] : ground bracelet on pt's wrist  [Contacts removed] : contacts removed  [No reading material] : no reading material  [Remove nail polish] : remove nail polish  [Bra, undergarments removed] : bra, undergarments removed  [Ground Continuity - Verified < 1 ohm w/ Wrist Strap Barb] : Ground Continuity - Verified < 1 ohm w/ Wrist Strap Barb [Ground Wire - VISUAL Verification - Intact/Free of Obstruction] : Ground Wire - VISUAL Verification - Intact/Free of Obstruction  [Number: ___] : Number: [unfilled] [Diagnosis: ___] : Diagnosis: [unfilled] [____] : Post-Dive: Time - [unfilled] [___] : Post-Dive: Value - [unfilled] mg/dL [Clear all fields] : clear all fields [] : No [FreeTextEntry3] : 90 [FreeTextEntry5] : 8:08 [FreeTextEntry7] : 8:17 [de-identified] : 9:56 [FreeTextEntry9] : 9:47 [de-identified] : 108

## 2019-12-20 NOTE — ADDENDUM
[FreeTextEntry1] : Pt arrived via wheelchair A&Ox3.\par Drainage assessed by Rn, wound care to follow hbot.\par Pre tx bgl not within parameters Rn advised and pt was provided 15 gms oral glucose via 2 glucose tablets and 1- 4oz juice.\par Retest remained outside parameters. Rn advised and pt was provided 15 gms oral glucose via 4- glucose tablets.\par All vitals within parameters for hbot.\par Pt descent to 2.0 RIVAS @ 1.75 psi/min in chamber 1 was without incident.\par Transfer of care to [facility].\par Pt resting @ depth with chest rise and fall observed throughout tx. \par Pt ascended from 2.0 RIVAS @ 1.75 PS/min without incident. \par Pt tolerated tx well. \par

## 2019-12-21 ENCOUNTER — APPOINTMENT (OUTPATIENT)
Dept: HYPERBARIC MEDICINE | Facility: HOSPITAL | Age: 44
End: 2019-12-21

## 2019-12-21 DIAGNOSIS — L97.422 NON-PRESSURE CHRONIC ULCER OF LEFT HEEL AND MIDFOOT WITH FAT LAYER EXPOSED: ICD-10-CM

## 2019-12-21 DIAGNOSIS — E10.621 TYPE 1 DIABETES MELLITUS WITH FOOT ULCER: ICD-10-CM

## 2019-12-21 DIAGNOSIS — Z79.4 LONG TERM (CURRENT) USE OF INSULIN: ICD-10-CM

## 2019-12-23 ENCOUNTER — APPOINTMENT (OUTPATIENT)
Dept: HYPERBARIC MEDICINE | Facility: HOSPITAL | Age: 44
End: 2019-12-23
Payer: COMMERCIAL

## 2019-12-23 ENCOUNTER — OUTPATIENT (OUTPATIENT)
Dept: OUTPATIENT SERVICES | Facility: HOSPITAL | Age: 44
LOS: 1 days | Discharge: HOME | End: 2019-12-23
Payer: COMMERCIAL

## 2019-12-23 VITALS
SYSTOLIC BLOOD PRESSURE: 153 MMHG | OXYGEN SATURATION: 97 % | DIASTOLIC BLOOD PRESSURE: 67 MMHG | HEART RATE: 74 BPM | RESPIRATION RATE: 18 BRPM | TEMPERATURE: 97.1 F

## 2019-12-23 VITALS
OXYGEN SATURATION: 100 % | HEART RATE: 78 BPM | SYSTOLIC BLOOD PRESSURE: 170 MMHG | TEMPERATURE: 97.6 F | RESPIRATION RATE: 18 BRPM | DIASTOLIC BLOOD PRESSURE: 78 MMHG

## 2019-12-23 DIAGNOSIS — E10.621 TYPE 1 DIABETES MELLITUS WITH FOOT ULCER: ICD-10-CM

## 2019-12-23 DIAGNOSIS — L97.422 NON-PRESSURE CHRONIC ULCER OF LEFT HEEL AND MIDFOOT WITH FAT LAYER EXPOSED: ICD-10-CM

## 2019-12-23 DIAGNOSIS — E11.621 TYPE 2 DIABETES MELLITUS WITH FOOT ULCER: ICD-10-CM

## 2019-12-23 DIAGNOSIS — Z79.4 LONG TERM (CURRENT) USE OF INSULIN: ICD-10-CM

## 2019-12-23 DIAGNOSIS — I77.0 ARTERIOVENOUS FISTULA, ACQUIRED: Chronic | ICD-10-CM

## 2019-12-23 PROCEDURE — G0277: CPT

## 2019-12-23 PROCEDURE — 82962 GLUCOSE BLOOD TEST: CPT

## 2019-12-23 PROCEDURE — 99183 HYPERBARIC OXYGEN THERAPY: CPT

## 2019-12-23 NOTE — PROCEDURE
[Outpatient] : Outpatient [Ambulatory] : Patient is ambulatory. [Wheelchair] : wheelchair [THIS CHAMBER HAS BEEN CLEANED / DISINFECTED] : This chamber has been cleaned / disinfected according to local and hospital policy and procedure prior to this treatment. [Patient demonstrated and verbalized proper technique for using air break mask] : Patient demonstrated and verbalized proper technique for using air break mask [Patient educated on the risks of CONSUMING ALCOHOL prior to HBOT with understanding] : Patient educated on the risks of CONSUMING ALCOHOL prior to HBOT with understanding [Patient educated on the risks of SMOKING prior to HBOT with understanding] : Patient educated on the risks of SMOKING prior to HBOT with understanding [100% Cotton] : 100% cotton [Empty all pockets] : empty all pockets [No hair oils, wigs, hairpieces, pins] : no hair oils, wigs, hairpieces, pins  [Pre tx medications] : pre tx medications  [No make-up, creams] : no make-up, creams  [No jewelry] : no jewelry  [No matches, cigarettes, lighters] : no matches, cigarettes, lighters  [Hearing aid removed] : hearing aid removed [Dentures removed] : dentures removed [Ground bracelet on pt's wrist] : ground bracelet on pt's wrist  [Contacts removed] : contacts removed  [Remove nail polish] : remove nail polish  [No reading material] : no reading material  [Bra, undergarments removed] : bra, undergarments removed  [No contraindicated dressings] : no contraindicated dressings [Ground Wire - VISUAL Verification - Intact/Free of Obstruction] : Ground Wire - VISUAL Verification - Intact/Free of Obstruction  [Ground Continuity - Verified < 1 ohm w/ Wrist Strap Barb] : Ground Continuity - Verified < 1 ohm w/ Wrist Strap Barb [Number: ___] : Number: [unfilled] [Diagnosis: ___] : Diagnosis: [unfilled] [____] : Post-Dive: Time - [unfilled] [___] : Post-Dive: Value - [unfilled] mg/dL [Clear all fields] : clear all fields [FreeTextEntry3] : 90 [] : No [FreeTextEntry5] : 4331 [FreeTextEntry7] : 5635 [FreeTextEntry9] : 8332 [de-identified] : 7234 [de-identified] : 108 MIN

## 2019-12-23 NOTE — ASSESSMENT
[No dizziness or thirst] :  No dizziness or thirst [No change from previous assessment] : No change from previous assessment [No ear problems] : No ear problems [Vital signs stable] : Vital signs stable [Tolerating dive well] : Tolerating dive well [Respiratory Rate Stable] : Respiratory Rate Stable [No Chest Pain, shortness of breath] : No Chest Pain, shortness of breath [No chest pain, shortness of breath, or ear pain] :  No chest pain, shortness of breath, or ear pain  [Tolerated Ascent well] : Tolerated Ascent well [Vital Signs stable] : Vital Signs stable [No] : No [A physician was present throughout the entire HBOT] : A physician was present throughout the entire HBOT [Clinically Stable] : Clinically stable [Continue Treatment Plan] : Continue treatment plan [0] : 0 out of 10

## 2019-12-23 NOTE — ADDENDUM
[FreeTextEntry1] : DRAINAGE NOTED ON PT'S DRESSING PRIOR TO DESCENT. CHRN NOTIFIED. PT TO RECEIVE DRESSING CHANGE POST HBOT.\par \par PT DESCENDED TO 2.0 RIVAS @ 1.75 PSI/MIN WITHOUT INCIDENT IN CHAMBER #1. PT'S RESTING AT TX DEPTH WITH CHEST RISE AND FALL OBSERVED CHAMBERSIDE.\par \par PT ASCENDED FROM 2.0 RIVAS @ 1.75 PSI/MIN WITHOUT INCIDENT. PT TOLERATED TX WELL.\par \par PT NOTIFIED OF ASSESSMENT TOMORROW (12/24/2019) WITH APPROPRIATE ARRIVAL TIME.

## 2019-12-24 ENCOUNTER — APPOINTMENT (OUTPATIENT)
Dept: HYPERBARIC MEDICINE | Facility: HOSPITAL | Age: 44
End: 2019-12-24
Payer: COMMERCIAL

## 2019-12-24 ENCOUNTER — OUTPATIENT (OUTPATIENT)
Dept: OUTPATIENT SERVICES | Facility: HOSPITAL | Age: 44
LOS: 1 days | Discharge: HOME | End: 2019-12-24
Payer: COMMERCIAL

## 2019-12-24 VITALS
OXYGEN SATURATION: 100 % | RESPIRATION RATE: 18 BRPM | HEART RATE: 80 BPM | BODY MASS INDEX: 27.62 KG/M2 | SYSTOLIC BLOOD PRESSURE: 111 MMHG | TEMPERATURE: 98.4 F | DIASTOLIC BLOOD PRESSURE: 68 MMHG | HEIGHT: 67 IN | WEIGHT: 176 LBS

## 2019-12-24 VITALS
WEIGHT: 176 LBS | RESPIRATION RATE: 18 BRPM | HEIGHT: 67 IN | HEART RATE: 80 BPM | SYSTOLIC BLOOD PRESSURE: 111 MMHG | BODY MASS INDEX: 27.62 KG/M2 | DIASTOLIC BLOOD PRESSURE: 68 MMHG | TEMPERATURE: 98.4 F | OXYGEN SATURATION: 100 %

## 2019-12-24 DIAGNOSIS — E11.621 TYPE 2 DIABETES MELLITUS WITH FOOT ULCER: ICD-10-CM

## 2019-12-24 DIAGNOSIS — I77.0 ARTERIOVENOUS FISTULA, ACQUIRED: Chronic | ICD-10-CM

## 2019-12-24 PROCEDURE — 11042 DBRDMT SUBQ TIS 1ST 20SQCM/<: CPT

## 2019-12-24 NOTE — PHYSICAL EXAM
[4 x 4] : 4 x 4  [Normal Breath Sounds] : Normal breath sounds [Normal Heart Sounds] : normal heart sounds [1+] : left 1+ [Ankle Swelling Bilaterally] : bilaterally  [0] : left 0 [Alert] : alert [Oriented to Person] : oriented to person [Calm] : calm [JVD] : no jugular venous distention  [Ankle Swelling (On Exam)] : not present [Varicose Veins Of Lower Extremities] : not present [] : not present [de-identified] : WD/WN in no acute distress. [de-identified] : WNL [de-identified] : LIBIAL [de-identified] : WNL [de-identified] : Left heel DFU with large Callus and necrotic tissue, some areas of granulation tissue, no acute infection.  [FreeTextEntry1] : Heel  [FreeTextEntry2] : 1.6 [FreeTextEntry3] : 1.4 [FreeTextEntry4] : 0.4 [de-identified] : Serosanguineous  [de-identified] : Callus [FreeTextEntry5] : 0 [de-identified] : Post Debridement measurement 2.2 x 3.1 x 0.5 [de-identified] : NSC, Alginate silver,DD [de-identified] : Tissue culture obtained [TWNoteComboBox1] : Left [TWNoteComboBox4] : Small [TWNoteComboBox5] : No [TWNoteComboBox6] : Diabetic [de-identified] : No [de-identified] : Normal [de-identified] : Mild [de-identified] : 50% [de-identified] : 50% [de-identified] : No [TWNoteComboBox7] : Isabela [de-identified] : Debridement performed of all devitalized tissue to bleeding viable tissue [de-identified] : Ace wraps [de-identified] : 3x Weekly [de-identified] : Secondary Dressing

## 2019-12-24 NOTE — REASON FOR VISIT
[FreeTextEntry5] : Left heel DFU [FreeTextEntry4] : Left heel DFU with Callous and necrotic tissue.  [FreeTextEntry3] : Left heel DFU with Callous and necrotic tissue.  [FreeTextEntry6] : Left heel DFU with Callous and necrotic tissue.  [FreeTextEntry7] : The same.

## 2019-12-24 NOTE — PROCEDURE
[Saline] : saline [Fibrotic] : fibrotic [Slough] : slough [Necrotic] : necrotic [Sharp scissors] : sharp scissors [Skin] : skin [Scalpel] : scalpel [Subcutaneous tissue] : subcutaneous tissue [Fat] : fat [Pressure] : pressure [Clean] : clean [Pink] : pink [AgNo3 Cauterization] : AgNo3 cauterization [FreeTextEntry1] : Silver Alginate, dry dressing, ACE wrap.  [] : No [FreeTextEntry9] : 9544 [de-identified] : DFU left heel [de-identified] : Casper Adler [de-identified] : n/a [de-identified] : n/a  [FreeTextEntry6] : DFU left heel [FreeTextEntry7] : DFU left heel [de-identified] : n/a  [de-identified] : 2cc  [de-identified] : Necrotic tissue

## 2019-12-24 NOTE — ASSESSMENT
[Verbal] : Verbal [Good - alert, interested, motivated] : Good - alert, interested, motivated [Written] : Written [Patient] : Patient [Foot Care] : foot care [Verbalizes knowledge/Understanding] : Verbalizes knowledge/understanding [Dressing changes] : dressing changes [Skin Care] : skin care [Pressure relief] : pressure relief [Signs and symptoms of infection] : sign and symptoms of infection [How and When to Call] : how and when to call [Hyperbaric Therapy] : hyperbaric therapy [Nutrition] : nutrition [Compression Therapy] : compression therapy [Patient responsibility to plan of care] : patient responsibility to plan of care [Home] : Home [Stable] : stable [Wheelchair] : Wheelchair [Not Applicable - Long Term Care/Home Health Agency] : Long Term Care/Home Health Agency: Not Applicable [] : No [FreeTextEntry2] : Infection prevention\par Offloading/ pressure relief\par Restoration of skin integrity  \par Glycemic control \par  [FreeTextEntry4] : No signs/symptoms of infection. \par Pt has completed 20/30 HBOT tx pt to follow up to [facility] daily for HBOT \par Auth submitted for a second debridement per MD recommendation \par Apligraf not recommended at this time due to excessive callous

## 2019-12-26 ENCOUNTER — APPOINTMENT (OUTPATIENT)
Dept: HYPERBARIC MEDICINE | Facility: HOSPITAL | Age: 44
End: 2019-12-26
Payer: COMMERCIAL

## 2019-12-26 ENCOUNTER — OUTPATIENT (OUTPATIENT)
Dept: OUTPATIENT SERVICES | Facility: HOSPITAL | Age: 44
LOS: 1 days | End: 2019-12-26
Payer: COMMERCIAL

## 2019-12-26 VITALS
RESPIRATION RATE: 18 BRPM | WEIGHT: 176 LBS | TEMPERATURE: 97 F | DIASTOLIC BLOOD PRESSURE: 71 MMHG | OXYGEN SATURATION: 100 % | HEIGHT: 67 IN | HEART RATE: 91 BPM | SYSTOLIC BLOOD PRESSURE: 137 MMHG | BODY MASS INDEX: 27.62 KG/M2

## 2019-12-26 VITALS
TEMPERATURE: 97.7 F | OXYGEN SATURATION: 100 % | SYSTOLIC BLOOD PRESSURE: 157 MMHG | HEART RATE: 82 BPM | RESPIRATION RATE: 18 BRPM | DIASTOLIC BLOOD PRESSURE: 86 MMHG

## 2019-12-26 DIAGNOSIS — I77.0 ARTERIOVENOUS FISTULA, ACQUIRED: Chronic | ICD-10-CM

## 2019-12-26 DIAGNOSIS — E11.621 TYPE 2 DIABETES MELLITUS WITH FOOT ULCER: ICD-10-CM

## 2019-12-26 PROCEDURE — 82962 GLUCOSE BLOOD TEST: CPT

## 2019-12-26 PROCEDURE — 99183 HYPERBARIC OXYGEN THERAPY: CPT

## 2019-12-26 NOTE — ASSESSMENT
[No change from previous assessment] : No change from previous assessment [Patient prepared for dive] : Patient prepared for dive [Patient undergoing HBO treatment for __________] : Patient undergoing HBO treatment for [unfilled] [Patient descended without problem for 9 minutes] : Patient descended without problem for 9 minutes [No ear problems] : No ear problems [No dizziness or thirst] :  No dizziness or thirst [Vital signs stable] : Vital signs stable [Tolerating dive well] : Tolerating dive well [No Chest Pain, shortness of breath] : No Chest Pain, shortness of breath [Respiratory Rate Stable] : Respiratory Rate Stable [Tolerated Ascent well] : Tolerated Ascent well [No chest pain, shortness of breath, or ear pain] :  No chest pain, shortness of breath, or ear pain  [A physician was present throughout the entire HBOT] : A physician was present throughout the entire HBOT [Vital Signs stable] : Vital Signs stable [No] : No [Clinically Stable] : Clinically stable [FreeTextEntry2] : None [0] : 0 out of 10 [Continue Treatment Plan] : Continue treatment plan

## 2019-12-26 NOTE — PROCEDURE
[Outpatient] : Outpatient [Wheelchair] : wheelchair [THIS CHAMBER HAS BEEN CLEANED / DISINFECTED] : This chamber has been cleaned / disinfected according to local and hospital policy and procedure prior to this treatment. [Patient demonstrated and verbalized proper technique for using air break mask] : Patient demonstrated and verbalized proper technique for using air break mask [Patient educated on the risks of CONSUMING ALCOHOL prior to HBOT with understanding] : Patient educated on the risks of CONSUMING ALCOHOL prior to HBOT with understanding [Patient educated on the risks of SMOKING prior to HBOT with understanding] : Patient educated on the risks of SMOKING prior to HBOT with understanding [100% Cotton] : 100% cotton [Empty all pockets] : empty all pockets [Pre tx medications] : pre tx medications  [No hair oils, wigs, hairpieces, pins] : no hair oils, wigs, hairpieces, pins  [No make-up, creams] : no make-up, creams  [No matches, cigarettes, lighters] : no matches, cigarettes, lighters  [No jewelry] : no jewelry  [Hearing aid removed] : hearing aid removed [Dentures removed] : dentures removed [Ground bracelet on pt's wrist] : ground bracelet on pt's wrist  [Contacts removed] : contacts removed  [No reading material] : no reading material  [Remove nail polish] : remove nail polish  [Bra, undergarments removed] : bra, undergarments removed  [No contraindicated dressings] : no contraindicated dressings [Ground Wire - VISUAL Verification - Intact/Free of Obstruction] : Ground Wire - VISUAL Verification - Intact/Free of Obstruction  [Ground Continuity - Verified < 1 ohm w/ Wrist Strap Barb] : Ground Continuity - Verified < 1 ohm w/ Wrist Strap Barb [Number: ___] : Number: [unfilled] [Diagnosis: ___] : Diagnosis: [unfilled] [____] : Post-Dive: Time - [unfilled] [___] : Post-Dive: Value - [unfilled] mg/dL [Clear all fields] : clear all fields [] : No [FreeTextEntry5] : 4241 [FreeTextEntry3] : 90 [FreeTextEntry1] : treatment depth  [FreeTextEntry7] : 2294 [FreeTextEntry9] : 4018 [de-identified] : 108 minutes  [de-identified] : 1000

## 2019-12-26 NOTE — ADDENDUM
[FreeTextEntry1] : Prior to dive, drainage noticed on pt's dressing. Pt received wound care by RN.\par Pt descended to depth without incident in chamber # 1\par Pt is resting @ depth with chest rise and fall observed @ chamber side.\par Pt ascended from depth without incident. \par Pt tolerated tx well\par Pt receive ace wrap post HBOT by RN.\par \par

## 2019-12-27 ENCOUNTER — OTHER (OUTPATIENT)
Age: 44
End: 2019-12-27

## 2019-12-27 ENCOUNTER — APPOINTMENT (OUTPATIENT)
Dept: HYPERBARIC MEDICINE | Facility: HOSPITAL | Age: 44
End: 2019-12-27

## 2019-12-28 ENCOUNTER — APPOINTMENT (OUTPATIENT)
Dept: HYPERBARIC MEDICINE | Facility: HOSPITAL | Age: 44
End: 2019-12-28
Payer: COMMERCIAL

## 2019-12-28 ENCOUNTER — OUTPATIENT (OUTPATIENT)
Dept: OUTPATIENT SERVICES | Facility: HOSPITAL | Age: 44
LOS: 1 days | Discharge: HOME | End: 2019-12-28
Payer: COMMERCIAL

## 2019-12-28 VITALS
DIASTOLIC BLOOD PRESSURE: 83 MMHG | RESPIRATION RATE: 16 BRPM | TEMPERATURE: 98 F | OXYGEN SATURATION: 100 % | SYSTOLIC BLOOD PRESSURE: 159 MMHG | HEART RATE: 74 BPM

## 2019-12-28 VITALS
OXYGEN SATURATION: 100 % | DIASTOLIC BLOOD PRESSURE: 76 MMHG | HEART RATE: 88 BPM | SYSTOLIC BLOOD PRESSURE: 157 MMHG | RESPIRATION RATE: 16 BRPM | TEMPERATURE: 97.8 F

## 2019-12-28 DIAGNOSIS — L97.422 NON-PRESSURE CHRONIC ULCER OF LEFT HEEL AND MIDFOOT WITH FAT LAYER EXPOSED: ICD-10-CM

## 2019-12-28 DIAGNOSIS — E10.621 TYPE 1 DIABETES MELLITUS WITH FOOT ULCER: ICD-10-CM

## 2019-12-28 DIAGNOSIS — I77.0 ARTERIOVENOUS FISTULA, ACQUIRED: Chronic | ICD-10-CM

## 2019-12-28 DIAGNOSIS — Z79.4 LONG TERM (CURRENT) USE OF INSULIN: ICD-10-CM

## 2019-12-28 DIAGNOSIS — E11.621 TYPE 2 DIABETES MELLITUS WITH FOOT ULCER: ICD-10-CM

## 2019-12-28 PROCEDURE — 99183 HYPERBARIC OXYGEN THERAPY: CPT

## 2019-12-28 PROCEDURE — G0277: CPT

## 2019-12-28 PROCEDURE — 82962 GLUCOSE BLOOD TEST: CPT

## 2019-12-28 NOTE — ASSESSMENT
[No change from previous assessment] : No change from previous assessment [Patient prepared for dive] : Patient prepared for dive [Patient undergoing HBO treatment for __________] : Patient undergoing HBO treatment for [unfilled] [Patient descended without problem for 9 minutes] : Patient descended without problem for 9 minutes [No dizziness or thirst] :  No dizziness or thirst [No ear problems] : No ear problems [Vital signs stable] : Vital signs stable [Tolerating dive well] : Tolerating dive well [No Chest Pain, shortness of breath] : No Chest Pain, shortness of breath [Respiratory Rate Stable] : Respiratory Rate Stable [No chest pain, shortness of breath, or ear pain] :  No chest pain, shortness of breath, or ear pain  [Vital Signs stable] : Vital Signs stable [Tolerated Ascent well] : Tolerated Ascent well [A physician was present throughout the entire HBOT] : A physician was present throughout the entire HBOT [No] : No [Clinically Stable] : Clinically stable [Continue Treatment Plan] : Continue treatment plan [FreeTextEntry2] : none

## 2019-12-28 NOTE — PROCEDURE
[Outpatient] : Outpatient [Ambulatory] : Patient is ambulatory. [Wheelchair] : wheelchair [THIS CHAMBER HAS BEEN CLEANED / DISINFECTED] : This chamber has been cleaned / disinfected according to local and hospital policy and procedure prior to this treatment. [Patient demonstrated and verbalized proper technique for using air break mask] : Patient demonstrated and verbalized proper technique for using air break mask [Patient educated on the risks of CONSUMING ALCOHOL prior to HBOT with understanding] : Patient educated on the risks of CONSUMING ALCOHOL prior to HBOT with understanding [Patient educated on the risks of SMOKING prior to HBOT with understanding] : Patient educated on the risks of SMOKING prior to HBOT with understanding [Empty all pockets] : empty all pockets [100% Cotton] : 100% cotton [No make-up, creams] : no make-up, creams  [Pre tx medications] : pre tx medications  [No hair oils, wigs, hairpieces, pins] : no hair oils, wigs, hairpieces, pins  [No matches, cigarettes, lighters] : no matches, cigarettes, lighters  [No jewelry] : no jewelry  [Hearing aid removed] : hearing aid removed [Ground bracelet on pt's wrist] : ground bracelet on pt's wrist  [Dentures removed] : dentures removed [Contacts removed] : contacts removed  [Remove nail polish] : remove nail polish  [No reading material] : no reading material  [Bra, undergarments removed] : bra, undergarments removed  [Ground Continuity - Verified < 1 ohm w/ Wrist Strap Barb] : Ground Continuity - Verified < 1 ohm w/ Wrist Strap Barb [Ground Wire - VISUAL Verification - Intact/Free of Obstruction] : Ground Wire - VISUAL Verification - Intact/Free of Obstruction  [No contraindicated dressings] : no contraindicated dressings [Diagnosis: ___] : Diagnosis: [unfilled] [Number: ___] : Number: [unfilled] [____] : Post-Dive: Time - [unfilled] [___] : Post-Dive: Value - [unfilled] mg/dL [Clear all fields] : clear all fields [] : No [FreeTextEntry1] : 2.0 [FreeTextEntry3] : 90 mins [FreeTextEntry5] : 4858 [FreeTextEntry7] : 7146 [FreeTextEntry9] : 5193 [de-identified] : 0951 [de-identified] : 108 mins

## 2019-12-28 NOTE — ADDENDUM
[FreeTextEntry1] : Drainage noted cleared by RN will receive dressing change post tx. \par Pt descended to 2.0 RIVAS@ 1.75psi/min without incident. \par Pt resting @ depth with equal chest rise and fall observed by chamberside. \par Pt ascended from 2.0 RIVAS@ 1.75psi/min without incident. \par Pt tolerated  tx well. Pt received Dressing change by RN post tx.

## 2019-12-29 DIAGNOSIS — Z79.4 LONG TERM (CURRENT) USE OF INSULIN: ICD-10-CM

## 2019-12-29 DIAGNOSIS — E10.29 TYPE 1 DIABETES MELLITUS WITH OTHER DIABETIC KIDNEY COMPLICATION: ICD-10-CM

## 2019-12-29 DIAGNOSIS — L97.422 NON-PRESSURE CHRONIC ULCER OF LEFT HEEL AND MIDFOOT WITH FAT LAYER EXPOSED: ICD-10-CM

## 2019-12-29 DIAGNOSIS — N19 UNSPECIFIED KIDNEY FAILURE: ICD-10-CM

## 2019-12-29 DIAGNOSIS — Z98.49 CATARACT EXTRACTION STATUS, UNSPECIFIED EYE: ICD-10-CM

## 2019-12-29 DIAGNOSIS — Z89.429 ACQUIRED ABSENCE OF OTHER TOE(S), UNSPECIFIED SIDE: ICD-10-CM

## 2019-12-29 DIAGNOSIS — E10.621 TYPE 1 DIABETES MELLITUS WITH FOOT ULCER: ICD-10-CM

## 2019-12-29 DIAGNOSIS — Z79.899 OTHER LONG TERM (CURRENT) DRUG THERAPY: ICD-10-CM

## 2019-12-29 DIAGNOSIS — Z99.2 DEPENDENCE ON RENAL DIALYSIS: ICD-10-CM

## 2019-12-30 ENCOUNTER — OUTPATIENT (OUTPATIENT)
Dept: OUTPATIENT SERVICES | Facility: HOSPITAL | Age: 44
LOS: 1 days | Discharge: HOME | End: 2019-12-30
Payer: COMMERCIAL

## 2019-12-30 ENCOUNTER — APPOINTMENT (OUTPATIENT)
Dept: HYPERBARIC MEDICINE | Facility: HOSPITAL | Age: 44
End: 2019-12-30
Payer: COMMERCIAL

## 2019-12-30 VITALS
SYSTOLIC BLOOD PRESSURE: 168 MMHG | OXYGEN SATURATION: 100 % | DIASTOLIC BLOOD PRESSURE: 84 MMHG | RESPIRATION RATE: 18 BRPM | HEART RATE: 80 BPM | TEMPERATURE: 97.7 F

## 2019-12-30 VITALS
HEART RATE: 83 BPM | RESPIRATION RATE: 18 BRPM | SYSTOLIC BLOOD PRESSURE: 152 MMHG | DIASTOLIC BLOOD PRESSURE: 77 MMHG | TEMPERATURE: 99.1 F | OXYGEN SATURATION: 99 %

## 2019-12-30 DIAGNOSIS — E11.621 TYPE 2 DIABETES MELLITUS WITH FOOT ULCER: ICD-10-CM

## 2019-12-30 DIAGNOSIS — Z79.4 LONG TERM (CURRENT) USE OF INSULIN: ICD-10-CM

## 2019-12-30 DIAGNOSIS — E10.621 TYPE 1 DIABETES MELLITUS WITH FOOT ULCER: ICD-10-CM

## 2019-12-30 DIAGNOSIS — I77.0 ARTERIOVENOUS FISTULA, ACQUIRED: Chronic | ICD-10-CM

## 2019-12-30 DIAGNOSIS — L97.422 NON-PRESSURE CHRONIC ULCER OF LEFT HEEL AND MIDFOOT WITH FAT LAYER EXPOSED: ICD-10-CM

## 2019-12-30 PROCEDURE — 82962 GLUCOSE BLOOD TEST: CPT

## 2019-12-30 PROCEDURE — 99183 HYPERBARIC OXYGEN THERAPY: CPT

## 2019-12-30 PROCEDURE — G0277: CPT

## 2019-12-30 NOTE — ADDENDUM
[FreeTextEntry1] : DRAINAGE NOTED ON PT'S DRESSING PRIOR TO DESCENT. CHRN NOTIFIED. PT TO RECEIVE DRESSING CAHNGE POST HBOT.\par \par PT DESCENDED TO 2.0 RIVAS @ 1.75 PSI/MIN WITHOUT INCIDENT IN CHAMBER # 1. PT'S RESTING AT TX DEPTH WITH WOUND OFFLOADED. CHEST RISE AND FALL OBSERVED CHAMBERSIDE.\par \par PT ASCENDED FROM 2.0 RIVAS @ 1.75 PSI/MIN WITHOUT INCIDENT. PT TOLERATED TX WELL.

## 2019-12-30 NOTE — PROCEDURE
[Wheelchair] : wheelchair [Outpatient] : Outpatient [THIS CHAMBER HAS BEEN CLEANED / DISINFECTED] : This chamber has been cleaned / disinfected according to local and hospital policy and procedure prior to this treatment. [Patient educated on the risks of SMOKING prior to HBOT with understanding] : Patient educated on the risks of SMOKING prior to HBOT with understanding [Patient demonstrated and verbalized proper technique for using air break mask] : Patient demonstrated and verbalized proper technique for using air break mask [Patient educated on the risks of CONSUMING ALCOHOL prior to HBOT with understanding] : Patient educated on the risks of CONSUMING ALCOHOL prior to HBOT with understanding [100% Cotton] : 100% cotton [Empty all pockets] : empty all pockets [No hair oils, wigs, hairpieces, pins] : no hair oils, wigs, hairpieces, pins  [Pre tx medications] : pre tx medications  [No make-up, creams] : no make-up, creams  [No jewelry] : no jewelry  [No matches, cigarettes, lighters] : no matches, cigarettes, lighters  [Hearing aid removed] : hearing aid removed [Dentures removed] : dentures removed [Ground bracelet on pt's wrist] : ground bracelet on pt's wrist  [Contacts removed] : contacts removed  [Remove nail polish] : remove nail polish  [No reading material] : no reading material  [Bra, undergarments removed] : bra, undergarments removed  [No contraindicated dressings] : no contraindicated dressings [Ground Wire - VISUAL Verification - Intact/Free of Obstruction] : Ground Wire - VISUAL Verification - Intact/Free of Obstruction  [Ground Continuity - Verified < 1 ohm w/ Wrist Strap Barb] : Ground Continuity - Verified < 1 ohm w/ Wrist Strap Barb [Number: ___] : Number: [unfilled] [Diagnosis: ___] : Diagnosis: [unfilled] [____] : Post-Dive: Time - [unfilled] [___] : Post-Dive: Value - [unfilled] mg/dL [Clear all fields] : clear all fields [FreeTextEntry3] : 90 [] : No [FreeTextEntry5] : 6288 [FreeTextEntry9] : 0344 [FreeTextEntry7] : 5261 [de-identified] : 0966 [de-identified] : 108 MIN

## 2019-12-30 NOTE — ASSESSMENT
[Patient descended without problem for 9 minutes] : Patient descended without problem for 9 minutes [Patient undergoing HBO treatment for __________] : Patient undergoing HBO treatment for [unfilled] [Vital signs stable] : Vital signs stable [No ear problems] : No ear problems [No dizziness or thirst] :  No dizziness or thirst [Respiratory Rate Stable] : Respiratory Rate Stable [No Chest Pain, shortness of breath] : No Chest Pain, shortness of breath [Tolerating dive well] : Tolerating dive well [Vital Signs stable] : Vital Signs stable [Tolerated Ascent well] : Tolerated Ascent well [No chest pain, shortness of breath, or ear pain] :  No chest pain, shortness of breath, or ear pain  [No] : No [Clinically Stable] : Clinically stable [A physician was present throughout the entire HBOT] : A physician was present throughout the entire HBOT [0] : 0 out of 10 [Continue Treatment Plan] : Continue treatment plan

## 2019-12-31 ENCOUNTER — APPOINTMENT (OUTPATIENT)
Dept: HYPERBARIC MEDICINE | Facility: HOSPITAL | Age: 44
End: 2019-12-31
Payer: COMMERCIAL

## 2019-12-31 ENCOUNTER — OUTPATIENT (OUTPATIENT)
Dept: OUTPATIENT SERVICES | Facility: HOSPITAL | Age: 44
LOS: 1 days | Discharge: HOME | End: 2019-12-31
Payer: COMMERCIAL

## 2019-12-31 VITALS
HEART RATE: 78 BPM | WEIGHT: 176 LBS | RESPIRATION RATE: 18 BRPM | BODY MASS INDEX: 27.62 KG/M2 | OXYGEN SATURATION: 100 % | HEIGHT: 67 IN | SYSTOLIC BLOOD PRESSURE: 147 MMHG | TEMPERATURE: 97.4 F | DIASTOLIC BLOOD PRESSURE: 81 MMHG

## 2019-12-31 VITALS
DIASTOLIC BLOOD PRESSURE: 62 MMHG | OXYGEN SATURATION: 98 % | RESPIRATION RATE: 18 BRPM | BODY MASS INDEX: 27.62 KG/M2 | HEART RATE: 87 BPM | SYSTOLIC BLOOD PRESSURE: 110 MMHG | WEIGHT: 176 LBS | HEIGHT: 67 IN | TEMPERATURE: 97.9 F

## 2019-12-31 DIAGNOSIS — E11.621 TYPE 2 DIABETES MELLITUS WITH FOOT ULCER: ICD-10-CM

## 2019-12-31 DIAGNOSIS — I77.0 ARTERIOVENOUS FISTULA, ACQUIRED: Chronic | ICD-10-CM

## 2019-12-31 PROCEDURE — 82962 GLUCOSE BLOOD TEST: CPT

## 2019-12-31 PROCEDURE — G0277: CPT

## 2019-12-31 PROCEDURE — 99183 HYPERBARIC OXYGEN THERAPY: CPT

## 2019-12-31 NOTE — ASSESSMENT
[Patient undergoing HBO treatment for __________] : Patient undergoing HBO treatment for [unfilled] [Patient descended without problem for 9 minutes] : Patient descended without problem for 9 minutes [No dizziness or thirst] :  No dizziness or thirst [Vital signs stable] : Vital signs stable [No ear problems] : No ear problems [Tolerating dive well] : Tolerating dive well [No Chest Pain, shortness of breath] : No Chest Pain, shortness of breath [Respiratory Rate Stable] : Respiratory Rate Stable [No chest pain, shortness of breath, or ear pain] :  No chest pain, shortness of breath, or ear pain  [Vital Signs stable] : Vital Signs stable [Tolerated Ascent well] : Tolerated Ascent well [A physician was present throughout the entire HBOT] : A physician was present throughout the entire HBOT [Clinically Stable] : Clinically stable [No] : No [Continue Treatment Plan] : Continue treatment plan [0] : 0 out of 10

## 2019-12-31 NOTE — PROCEDURE
[Outpatient] : Outpatient [Wheelchair] : wheelchair [THIS CHAMBER HAS BEEN CLEANED / DISINFECTED] : This chamber has been cleaned / disinfected according to local and hospital policy and procedure prior to this treatment. [Patient demonstrated and verbalized proper technique for using air break mask] : Patient demonstrated and verbalized proper technique for using air break mask [Patient educated on the risks of SMOKING prior to HBOT with understanding] : Patient educated on the risks of SMOKING prior to HBOT with understanding [Patient educated on the risks of CONSUMING ALCOHOL prior to HBOT with understanding] : Patient educated on the risks of CONSUMING ALCOHOL prior to HBOT with understanding [100% Cotton] : 100% cotton [Empty all pockets] : empty all pockets [No hair oils, wigs, hairpieces, pins] : no hair oils, wigs, hairpieces, pins  [Pre tx medications] : pre tx medications  [No make-up, creams] : no make-up, creams  [No jewelry] : no jewelry  [No matches, cigarettes, lighters] : no matches, cigarettes, lighters  [Hearing aid removed] : hearing aid removed [Dentures removed] : dentures removed [Ground bracelet on pt's wrist] : ground bracelet on pt's wrist  [Contacts removed] : contacts removed  [No reading material] : no reading material  [Remove nail polish] : remove nail polish  [No contraindicated dressings] : no contraindicated dressings [Bra, undergarments removed] : bra, undergarments removed  [Ground Wire - VISUAL Verification - Intact/Free of Obstruction] : Ground Wire - VISUAL Verification - Intact/Free of Obstruction  [Ground Continuity - Verified < 1 ohm w/ Wrist Strap Barb] : Ground Continuity - Verified < 1 ohm w/ Wrist Strap Barb [Diagnosis: ___] : Diagnosis: [unfilled] [Number: ___] : Number: [unfilled] [____] : Post-Dive: Time - [unfilled] [___] : Post-Dive: Value - [unfilled] mg/dL [Clear all fields] : clear all fields [] : No [FreeTextEntry3] : 90 [FreeTextEntry5] : 3328 [FreeTextEntry7] : 8941 [FreeTextEntry9] : 3351 [de-identified] : 1205 [de-identified] : 108 min

## 2019-12-31 NOTE — ADDENDUM
[FreeTextEntry1] : DRAINAGE NOTED ON PT'S DRESSING PRIOR TO DESCENT. CHRN NOTIFIED. PT TO RECEIVE DRESSING CHANGE POST HBOT.\par \par PT DESCENDED TO 2.0 RIVAS @ 1.75 PSI/MIN WITHOUT INCIDENT IN CHAMBER # 1. PT'S RESTING AT TX DEPTH WITH WOUND OFFLOADED. CHEST RISE AND FALL OBSERVED CHAMBERSIDE.\par \par Pt ascended from 2.0 RIVAS @ 1.75 PSI/min without incident. \par Pt tolerated tx well. Pt received wound care post HBO tx.

## 2020-01-01 DIAGNOSIS — L97.422 NON-PRESSURE CHRONIC ULCER OF LEFT HEEL AND MIDFOOT WITH FAT LAYER EXPOSED: ICD-10-CM

## 2020-01-01 DIAGNOSIS — E10.621 TYPE 1 DIABETES MELLITUS WITH FOOT ULCER: ICD-10-CM

## 2020-01-01 DIAGNOSIS — Z79.4 LONG TERM (CURRENT) USE OF INSULIN: ICD-10-CM

## 2020-01-02 ENCOUNTER — OUTPATIENT (OUTPATIENT)
Dept: OUTPATIENT SERVICES | Facility: HOSPITAL | Age: 45
LOS: 1 days | Discharge: HOME | End: 2020-01-02
Payer: COMMERCIAL

## 2020-01-02 ENCOUNTER — APPOINTMENT (OUTPATIENT)
Dept: HYPERBARIC MEDICINE | Facility: HOSPITAL | Age: 45
End: 2020-01-02
Payer: COMMERCIAL

## 2020-01-02 VITALS
TEMPERATURE: 97.2 F | OXYGEN SATURATION: 99 % | RESPIRATION RATE: 20 BRPM | HEART RATE: 80 BPM | SYSTOLIC BLOOD PRESSURE: 161 MMHG | DIASTOLIC BLOOD PRESSURE: 88 MMHG

## 2020-01-02 VITALS
RESPIRATION RATE: 20 BRPM | OXYGEN SATURATION: 100 % | TEMPERATURE: 97.3 F | HEART RATE: 76 BPM | DIASTOLIC BLOOD PRESSURE: 79 MMHG | SYSTOLIC BLOOD PRESSURE: 158 MMHG

## 2020-01-02 DIAGNOSIS — I77.0 ARTERIOVENOUS FISTULA, ACQUIRED: Chronic | ICD-10-CM

## 2020-01-02 DIAGNOSIS — E11.621 TYPE 2 DIABETES MELLITUS WITH FOOT ULCER: ICD-10-CM

## 2020-01-02 PROCEDURE — G0277: CPT

## 2020-01-02 PROCEDURE — 99183 HYPERBARIC OXYGEN THERAPY: CPT

## 2020-01-02 PROCEDURE — 82962 GLUCOSE BLOOD TEST: CPT

## 2020-01-02 NOTE — ADDENDUM
[FreeTextEntry1] : Prior to dive, drainage noticed on pt's dressing. RN notified. Pt clear to dive. Pt to receive wound care post HBOT.\par Pt descended to depth without incident in chamber # 1.\par Pt is resting @ depth with chest rise and fall observed @ chamber side\par Pt ascended from depth without incident. \par Pt tolerated tx well\par Pt receive wound care post HBOT by RN.\par .\par .\par

## 2020-01-02 NOTE — ASSESSMENT
[No change from previous assessment] : No change from previous assessment [Patient prepared for dive] : Patient prepared for dive [Patient undergoing HBO treatment for __________] : Patient undergoing HBO treatment for [unfilled] [Patient descended without problem for 9 minutes] : Patient descended without problem for 9 minutes [No dizziness or thirst] :  No dizziness or thirst [No ear problems] : No ear problems [Vital signs stable] : Vital signs stable [Tolerating dive well] : Tolerating dive well [No Chest Pain, shortness of breath] : No Chest Pain, shortness of breath [Respiratory Rate Stable] : Respiratory Rate Stable [No chest pain, shortness of breath, or ear pain] :  No chest pain, shortness of breath, or ear pain  [Tolerated Ascent well] : Tolerated Ascent well [Vital Signs stable] : Vital Signs stable [A physician was present throughout the entire HBOT] : A physician was present throughout the entire HBOT [No] : No [Continue Treatment Plan] : Continue treatment plan [Clinically Stable] : Clinically stable [FreeTextEntry2] : none

## 2020-01-02 NOTE — PROCEDURE
[Outpatient] : Outpatient [Wheelchair] : wheelchair [] : Yes [THIS CHAMBER HAS BEEN CLEANED / DISINFECTED] : This chamber has been cleaned / disinfected according to local and hospital policy and procedure prior to this treatment. [Patient demonstrated and verbalized proper technique for using air break mask] : Patient demonstrated and verbalized proper technique for using air break mask [Patient educated on the risks of SMOKING prior to HBOT with understanding] : Patient educated on the risks of SMOKING prior to HBOT with understanding [Patient educated on the risks of CONSUMING ALCOHOL prior to HBOT with understanding] : Patient educated on the risks of CONSUMING ALCOHOL prior to HBOT with understanding [100% Cotton] : 100% cotton [Empty all pockets] : empty all pockets [No hair oils, wigs, hairpieces, pins] : no hair oils, wigs, hairpieces, pins  [Pre tx medications] : pre tx medications  [No make-up, creams] : no make-up, creams  [No jewelry] : no jewelry  [No matches, cigarettes, lighters] : no matches, cigarettes, lighters  [Hearing aid removed] : hearing aid removed [Dentures removed] : dentures removed [Ground bracelet on pt's wrist] : ground bracelet on pt's wrist  [Contacts removed] : contacts removed  [Remove nail polish] : remove nail polish  [Bra, undergarments removed] : bra, undergarments removed  [No reading material] : no reading material  [No contraindicated dressings] : no contraindicated dressings [Ground Wire - VISUAL Verification - Intact/Free of Obstruction] : Ground Wire - VISUAL Verification - Intact/Free of Obstruction  [Ground Continuity - Verified < 1 ohm w/ Wrist Strap Barb] : Ground Continuity - Verified < 1 ohm w/ Wrist Strap Barb [Number: ___] : Number: [unfilled] [Diagnosis: ___] : Diagnosis: [unfilled] [____] : Post-Dive: Time - [unfilled] [___] : Post-Dive: Value - [unfilled] mg/dL [Clear all fields] : clear all fields [FreeTextEntry1] : tx depth  [FreeTextEntry5] : 4221 [FreeTextEntry3] : 90 [FreeTextEntry7] : 3764 [FreeTextEntry9] : 2425 [de-identified] : 0939 [de-identified] : 108 min

## 2020-01-03 ENCOUNTER — OUTPATIENT (OUTPATIENT)
Dept: OUTPATIENT SERVICES | Facility: HOSPITAL | Age: 45
LOS: 1 days | Discharge: HOME | End: 2020-01-03
Payer: COMMERCIAL

## 2020-01-03 ENCOUNTER — APPOINTMENT (OUTPATIENT)
Dept: HYPERBARIC MEDICINE | Facility: HOSPITAL | Age: 45
End: 2020-01-03
Payer: COMMERCIAL

## 2020-01-03 VITALS
HEIGHT: 67 IN | TEMPERATURE: 97.5 F | RESPIRATION RATE: 20 BRPM | OXYGEN SATURATION: 100 % | WEIGHT: 176 LBS | BODY MASS INDEX: 27.62 KG/M2 | DIASTOLIC BLOOD PRESSURE: 83 MMHG | HEART RATE: 80 BPM | SYSTOLIC BLOOD PRESSURE: 172 MMHG

## 2020-01-03 DIAGNOSIS — I77.0 ARTERIOVENOUS FISTULA, ACQUIRED: Chronic | ICD-10-CM

## 2020-01-03 DIAGNOSIS — Z79.4 LONG TERM (CURRENT) USE OF INSULIN: ICD-10-CM

## 2020-01-03 DIAGNOSIS — E10.621 TYPE 1 DIABETES MELLITUS WITH FOOT ULCER: ICD-10-CM

## 2020-01-03 DIAGNOSIS — L97.422 NON-PRESSURE CHRONIC ULCER OF LEFT HEEL AND MIDFOOT WITH FAT LAYER EXPOSED: ICD-10-CM

## 2020-01-03 DIAGNOSIS — E11.621 TYPE 2 DIABETES MELLITUS WITH FOOT ULCER: ICD-10-CM

## 2020-01-03 PROCEDURE — 82962 GLUCOSE BLOOD TEST: CPT

## 2020-01-03 PROCEDURE — 99183 HYPERBARIC OXYGEN THERAPY: CPT

## 2020-01-03 PROCEDURE — G0277: CPT

## 2020-01-03 NOTE — PROCEDURE
[Outpatient] : Outpatient [Wheelchair] : wheelchair [THIS CHAMBER HAS BEEN CLEANED / DISINFECTED] : This chamber has been cleaned / disinfected according to local and hospital policy and procedure prior to this treatment. [Patient demonstrated and verbalized proper technique for using air break mask] : Patient demonstrated and verbalized proper technique for using air break mask [Patient educated on the risks of SMOKING prior to HBOT with understanding] : Patient educated on the risks of SMOKING prior to HBOT with understanding [Patient educated on the risks of CONSUMING ALCOHOL prior to HBOT with understanding] : Patient educated on the risks of CONSUMING ALCOHOL prior to HBOT with understanding [100% Cotton] : 100% cotton [No hair oils, wigs, hairpieces, pins] : no hair oils, wigs, hairpieces, pins  [Empty all pockets] : empty all pockets [Pre tx medications] : pre tx medications  [No make-up, creams] : no make-up, creams  [No jewelry] : no jewelry  [No matches, cigarettes, lighters] : no matches, cigarettes, lighters  [Hearing aid removed] : hearing aid removed [Dentures removed] : dentures removed [Ground bracelet on pt's wrist] : ground bracelet on pt's wrist  [Contacts removed] : contacts removed  [Remove nail polish] : remove nail polish  [Bra, undergarments removed] : bra, undergarments removed  [No reading material] : no reading material  [No contraindicated dressings] : no contraindicated dressings [Ground Wire - VISUAL Verification - Intact/Free of Obstruction] : Ground Wire - VISUAL Verification - Intact/Free of Obstruction  [Ground Continuity - Verified < 1 ohm w/ Wrist Strap Barb] : Ground Continuity - Verified < 1 ohm w/ Wrist Strap Barb [Number: ___] : Number: [unfilled] [Diagnosis: ___] : Diagnosis: [unfilled] [____] : Post-Dive: Time - [unfilled] [___] : Post-Dive: Value - [unfilled] mg/dL [Clear all fields] : clear all fields [FreeTextEntry3] : 90 [] : No [FreeTextEntry7] : 8:35 [FreeTextEntry5] : 8:26 [FreeTextEntry9] : 10:05 [de-identified] : 10:14 [de-identified] : 108 MIN

## 2020-01-03 NOTE — ADDENDUM
[FreeTextEntry1] : PT ARRIVED VIA WHEEL CHAIR FROM RESIDENCE A&OX3.\par DRAINAGE ASSESSED BY RN. WOUND CARE TO FOLLOW HBOT\par PRE TX BGL NOT WITHIN PARAMETERS FOR HBOT. RN ADVISED AND PT WAS PROVIDED 15 GMS ORAL GLUCOSE VIA 2- 4OZ JUICE.\par RETEST NOT WITHIN PARAMETERS. PT REQUEST RETEST IN ALTERNATE HAND. RN ADVISED. RETEST WITHIN PARAMETERS FOR HBOT.\par PT DESCENT TO 2.0 RIVAS@ 1.75 PSI/MIN WAS WITHOUT INCIDENT. PT RESTING AT TX DEPTH CHEST RISE AND FALL OBSERVED\par \par PT ASCENDED FROM 2.0 RIVAS @ 1.75 PSI/MIN WITHOUT INCIDENT. PT TOLERATED TX WELL.

## 2020-01-03 NOTE — ASSESSMENT
[No change from previous assessment] : No change from previous assessment [Patient prepared for dive] : Patient prepared for dive [Patient undergoing HBO treatment for __________] : Patient undergoing HBO treatment for [unfilled] [No dizziness or thirst] :  No dizziness or thirst [Patient descended without problem for 9 minutes] : Patient descended without problem for 9 minutes [No ear problems] : No ear problems [Tolerating dive well] : Tolerating dive well [Vital signs stable] : Vital signs stable [Respiratory Rate Stable] : Respiratory Rate Stable [No Chest Pain, shortness of breath] : No Chest Pain, shortness of breath [Tolerated Ascent well] : Tolerated Ascent well [No chest pain, shortness of breath, or ear pain] :  No chest pain, shortness of breath, or ear pain  [Vital Signs stable] : Vital Signs stable [No] : No [A physician was present throughout the entire HBOT] : A physician was present throughout the entire HBOT [Clinically Stable] : Clinically stable [Continue Treatment Plan] : Continue treatment plan [0] : 0 out of 10 [FreeTextEntry2] : None

## 2020-01-06 ENCOUNTER — OTHER (OUTPATIENT)
Age: 45
End: 2020-01-06

## 2020-01-06 ENCOUNTER — OUTPATIENT (OUTPATIENT)
Dept: OUTPATIENT SERVICES | Facility: HOSPITAL | Age: 45
LOS: 1 days | Discharge: HOME | End: 2020-01-06
Payer: COMMERCIAL

## 2020-01-06 ENCOUNTER — APPOINTMENT (OUTPATIENT)
Dept: SURGERY | Facility: HOSPITAL | Age: 45
End: 2020-01-06

## 2020-01-06 ENCOUNTER — APPOINTMENT (OUTPATIENT)
Dept: HYPERBARIC MEDICINE | Facility: HOSPITAL | Age: 45
End: 2020-01-06
Payer: COMMERCIAL

## 2020-01-06 VITALS
OXYGEN SATURATION: 100 % | HEART RATE: 77 BPM | SYSTOLIC BLOOD PRESSURE: 174 MMHG | RESPIRATION RATE: 18 BRPM | DIASTOLIC BLOOD PRESSURE: 78 MMHG | TEMPERATURE: 97.8 F

## 2020-01-06 DIAGNOSIS — E10.621 TYPE 1 DIABETES MELLITUS WITH FOOT ULCER: ICD-10-CM

## 2020-01-06 DIAGNOSIS — E11.621 TYPE 2 DIABETES MELLITUS WITH FOOT ULCER: ICD-10-CM

## 2020-01-06 DIAGNOSIS — I77.0 ARTERIOVENOUS FISTULA, ACQUIRED: Chronic | ICD-10-CM

## 2020-01-06 DIAGNOSIS — L97.422 NON-PRESSURE CHRONIC ULCER OF LEFT HEEL AND MIDFOOT WITH FAT LAYER EXPOSED: ICD-10-CM

## 2020-01-06 DIAGNOSIS — Z79.4 LONG TERM (CURRENT) USE OF INSULIN: ICD-10-CM

## 2020-01-06 PROCEDURE — 99183 HYPERBARIC OXYGEN THERAPY: CPT

## 2020-01-06 PROCEDURE — G0277: CPT

## 2020-01-06 PROCEDURE — 82962 GLUCOSE BLOOD TEST: CPT

## 2020-01-06 NOTE — ASSESSMENT
[Patient prepared for dive] : Patient prepared for dive [No dizziness or thirst] :  No dizziness or thirst [Patient descended without problem for 9 minutes] : Patient descended without problem for 9 minutes [No ear problems] : No ear problems [Tolerating dive well] : Tolerating dive well [Vital signs stable] : Vital signs stable [No chest pain, shortness of breath, or ear pain] :  No chest pain, shortness of breath, or ear pain  [Respiratory Rate Stable] : Respiratory Rate Stable [No Chest Pain, shortness of breath] : No Chest Pain, shortness of breath [Tolerated Ascent well] : Tolerated Ascent well [A physician was present throughout the entire HBOT] : A physician was present throughout the entire HBOT [Vital Signs stable] : Vital Signs stable [No] : No [Clinically Stable] : Clinically stable [Continue Treatment Plan] : Continue treatment plan [0] : 0 out of 10

## 2020-01-06 NOTE — ADDENDUM
[FreeTextEntry1] : DRAINAGE NOTED ON PT'S DRESSING PRIOR TO DESCENT. PT RECEIVED DRESSING CHANGE PRIOR.\par \par PT DESCENDED TO 2.0 RIVAS @ 1.75 PSI/MIN WITHOUT INCIDENT IN CHAMBER # 1.PT'S RESTING AT TX DEPTH WITH CHEST RISE AND FALL OBSERVED CHAMBERSIDE.\par \par PT ASCENDED FROM 2.0 RIVAS @ 1.75 PSI/MIN WITHOUT INCIDENT. PT TOLERATED TX WELL.

## 2020-01-06 NOTE — PROCEDURE
[Outpatient] : Outpatient [Ambulatory] : Patient is ambulatory. [Wheelchair] : wheelchair [THIS CHAMBER HAS BEEN CLEANED / DISINFECTED] : This chamber has been cleaned / disinfected according to local and hospital policy and procedure prior to this treatment. [____] : Post-Dive: Time - [unfilled] [___] : Post-Dive: Value - [unfilled] mg/dL [Patient educated on the risks of SMOKING prior to HBOT with understanding] : Patient educated on the risks of SMOKING prior to HBOT with understanding [Patient demonstrated and verbalized proper technique for using air break mask] : Patient demonstrated and verbalized proper technique for using air break mask [Patient educated on the risks of CONSUMING ALCOHOL prior to HBOT with understanding] : Patient educated on the risks of CONSUMING ALCOHOL prior to HBOT with understanding [Empty all pockets] : empty all pockets [100% Cotton] : 100% cotton [No hair oils, wigs, hairpieces, pins] : no hair oils, wigs, hairpieces, pins  [No make-up, creams] : no make-up, creams  [Pre tx medications] : pre tx medications  [No jewelry] : no jewelry  [No matches, cigarettes, lighters] : no matches, cigarettes, lighters  [Dentures removed] : dentures removed [Hearing aid removed] : hearing aid removed [Ground bracelet on pt's wrist] : ground bracelet on pt's wrist  [Contacts removed] : contacts removed  [Remove nail polish] : remove nail polish  [No reading material] : no reading material  [Bra, undergarments removed] : bra, undergarments removed  [No contraindicated dressings] : no contraindicated dressings [Ground Wire - VISUAL Verification - Intact/Free of Obstruction] : Ground Wire - VISUAL Verification - Intact/Free of Obstruction  [Ground Continuity - Verified < 1 ohm w/ Wrist Strap Barb] : Ground Continuity - Verified < 1 ohm w/ Wrist Strap Barb [Number: ___] : Number: [unfilled] [Clear all fields] : clear all fields [Diagnosis: ___] : Diagnosis: [unfilled] [FreeTextEntry3] : 90 [] : No [FreeTextEntry5] : 8696 [FreeTextEntry7] : 8075 [de-identified] : 3247 [de-identified] : 108 MIN [FreeTextEntry9] : 9269

## 2020-01-07 ENCOUNTER — APPOINTMENT (OUTPATIENT)
Dept: HYPERBARIC MEDICINE | Facility: HOSPITAL | Age: 45
End: 2020-01-07
Payer: COMMERCIAL

## 2020-01-07 ENCOUNTER — OUTPATIENT (OUTPATIENT)
Dept: OUTPATIENT SERVICES | Facility: HOSPITAL | Age: 45
LOS: 1 days | Discharge: HOME | End: 2020-01-07
Payer: COMMERCIAL

## 2020-01-07 VITALS
TEMPERATURE: 97.7 F | RESPIRATION RATE: 16 BRPM | SYSTOLIC BLOOD PRESSURE: 121 MMHG | HEART RATE: 77 BPM | OXYGEN SATURATION: 100 % | DIASTOLIC BLOOD PRESSURE: 66 MMHG

## 2020-01-07 VITALS
SYSTOLIC BLOOD PRESSURE: 156 MMHG | OXYGEN SATURATION: 100 % | HEART RATE: 76 BPM | TEMPERATURE: 97.2 F | RESPIRATION RATE: 18 BRPM | DIASTOLIC BLOOD PRESSURE: 81 MMHG

## 2020-01-07 DIAGNOSIS — L97.422 NON-PRESSURE CHRONIC ULCER OF LEFT HEEL AND MIDFOOT WITH FAT LAYER EXPOSED: ICD-10-CM

## 2020-01-07 DIAGNOSIS — E11.621 TYPE 2 DIABETES MELLITUS WITH FOOT ULCER: ICD-10-CM

## 2020-01-07 DIAGNOSIS — I77.0 ARTERIOVENOUS FISTULA, ACQUIRED: Chronic | ICD-10-CM

## 2020-01-07 DIAGNOSIS — Z79.4 LONG TERM (CURRENT) USE OF INSULIN: ICD-10-CM

## 2020-01-07 DIAGNOSIS — E10.621 TYPE 1 DIABETES MELLITUS WITH FOOT ULCER: ICD-10-CM

## 2020-01-07 PROCEDURE — 82962 GLUCOSE BLOOD TEST: CPT

## 2020-01-07 PROCEDURE — G0277: CPT

## 2020-01-07 PROCEDURE — 99183 HYPERBARIC OXYGEN THERAPY: CPT

## 2020-01-07 NOTE — ASSESSMENT
[Patient prepared for dive] : Patient prepared for dive [Patient descended without problem for 9 minutes] : Patient descended without problem for 9 minutes [No dizziness or thirst] :  No dizziness or thirst [No ear problems] : No ear problems [Vital signs stable] : Vital signs stable [Tolerating dive well] : Tolerating dive well [No Chest Pain, shortness of breath] : No Chest Pain, shortness of breath [Respiratory Rate Stable] : Respiratory Rate Stable [No chest pain, shortness of breath, or ear pain] :  No chest pain, shortness of breath, or ear pain  [Tolerated Ascent well] : Tolerated Ascent well [Vital Signs stable] : Vital Signs stable [A physician was present throughout the entire HBOT] : A physician was present throughout the entire HBOT [No] : No [Clinically Stable] : Clinically stable [Continue Treatment Plan] : Continue treatment plan [0] : 0 out of 10

## 2020-01-08 ENCOUNTER — APPOINTMENT (OUTPATIENT)
Dept: PODIATRY | Facility: HOSPITAL | Age: 45
End: 2020-01-08
Payer: COMMERCIAL

## 2020-01-08 ENCOUNTER — OUTPATIENT (OUTPATIENT)
Dept: OUTPATIENT SERVICES | Facility: HOSPITAL | Age: 45
LOS: 1 days | Discharge: HOME | End: 2020-01-08
Payer: COMMERCIAL

## 2020-01-08 ENCOUNTER — APPOINTMENT (OUTPATIENT)
Dept: HYPERBARIC MEDICINE | Facility: HOSPITAL | Age: 45
End: 2020-01-08

## 2020-01-08 VITALS
BODY MASS INDEX: 27.62 KG/M2 | TEMPERATURE: 97.4 F | DIASTOLIC BLOOD PRESSURE: 26 MMHG | HEIGHT: 67 IN | HEART RATE: 82 BPM | SYSTOLIC BLOOD PRESSURE: 140 MMHG | WEIGHT: 176 LBS | RESPIRATION RATE: 18 BRPM | OXYGEN SATURATION: 100 %

## 2020-01-08 DIAGNOSIS — E11.621 TYPE 2 DIABETES MELLITUS WITH FOOT ULCER: ICD-10-CM

## 2020-01-08 DIAGNOSIS — I77.0 ARTERIOVENOUS FISTULA, ACQUIRED: Chronic | ICD-10-CM

## 2020-01-08 PROCEDURE — 11043 DBRDMT MUSC&/FSCA 1ST 20/<: CPT

## 2020-01-08 NOTE — VITALS
[Pain related to present condition?] : The patient's  pain is related to present condition. [] : Yes

## 2020-01-08 NOTE — ASSESSMENT
[Verbal] : Verbal [Demo] : Demo [Patient] : Patient [Fair - mild discomfort, physical impairment, low acceptance] : Fair - mild discomfort, physical impairment, low acceptance [Verbalizes knowledge/Understanding] : Verbalizes knowledge/understanding [Dressing changes] : dressing changes [Pressure relief] : pressure relief [Signs and symptoms of infection] : sign and symptoms of infection [How and When to Call] : how and when to call [Hyperbaric Therapy] : hyperbaric therapy [Off-loading] : off-loading [Compression Therapy] : compression therapy [Patient responsibility to plan of care] : patient responsibility to plan of care [] : Yes [Home] : Home [Stable] : stable [Not Applicable - Long Term Care/Home Health Agency] : Long Term Care/Home Health Agency: Not Applicable [Wheelchair] : Wheelchair

## 2020-01-09 ENCOUNTER — OUTPATIENT (OUTPATIENT)
Dept: OUTPATIENT SERVICES | Facility: HOSPITAL | Age: 45
LOS: 1 days | Discharge: HOME | End: 2020-01-09
Payer: COMMERCIAL

## 2020-01-09 ENCOUNTER — APPOINTMENT (OUTPATIENT)
Dept: HYPERBARIC MEDICINE | Facility: HOSPITAL | Age: 45
End: 2020-01-09
Payer: COMMERCIAL

## 2020-01-09 VITALS
TEMPERATURE: 97.2 F | SYSTOLIC BLOOD PRESSURE: 181 MMHG | DIASTOLIC BLOOD PRESSURE: 87 MMHG | HEART RATE: 70 BPM | OXYGEN SATURATION: 100 % | RESPIRATION RATE: 18 BRPM

## 2020-01-09 VITALS
SYSTOLIC BLOOD PRESSURE: 166 MMHG | HEART RATE: 75 BPM | TEMPERATURE: 96.9 F | OXYGEN SATURATION: 100 % | RESPIRATION RATE: 16 BRPM | DIASTOLIC BLOOD PRESSURE: 78 MMHG

## 2020-01-09 DIAGNOSIS — E11.621 TYPE 2 DIABETES MELLITUS WITH FOOT ULCER: ICD-10-CM

## 2020-01-09 DIAGNOSIS — I77.0 ARTERIOVENOUS FISTULA, ACQUIRED: Chronic | ICD-10-CM

## 2020-01-09 PROCEDURE — 99183 HYPERBARIC OXYGEN THERAPY: CPT

## 2020-01-09 PROCEDURE — 82962 GLUCOSE BLOOD TEST: CPT

## 2020-01-09 PROCEDURE — G0277: CPT

## 2020-01-09 NOTE — ASSESSMENT
[] : No [FreeTextEntry2] : Infection Prevention\par Offloading/Pressure relief\par HBOT\par Apligraf\par 1 unit apligraf ordered on 01/08/20 for pts next assessment [FreeTextEntry3] : Wounds larger & Deeper [FreeTextEntry4] : Pt completed 28/30 HBOT. 10 Additional Treatments ordered by MD, paperwork submitted for Authorization.\par MD performed Sharps Debridement to Left Heel, No Cultures were obtained.\par Submitted Authorization for Apligraf to brea.\par Pt to F/U to [facility] daily for HBOT & Assessment pending Apligraf Authorization.

## 2020-01-09 NOTE — REVIEW OF SYSTEMS
[Fever] : no fever [Chills] : no chills [Loss Of Hearing] : no hearing loss [Shortness Of Breath] : no shortness of breath [Abdominal Pain] : no abdominal pain [Joint Stiffness] : joint stiffness [Skin Wound] : skin wound [Anxiety] : no anxiety [Negative] : Cardiovascular [Easy Bleeding] : no tendency for easy bleeding [de-identified] : IDDM with neuropathy  [de-identified] : DFU 3 plantar left heel , skin, subcutaneous , fat , fascia  [de-identified] : DEE DEE

## 2020-01-09 NOTE — PROCEDURE
[Betadine] : betadine [Fibrotic] : fibrotic [Slough] : slough [Necrotic] : necrotic [Scalpel] : scalpel [Sharp scissors] : sharp scissors [Skin] : skin [Fat] : fat [Fascia] : fascia [Subcutaneous tissue] : subcutaneous tissue [Clean] : clean [Pink] : pink [Pressure] : pressure [FreeTextEntry2] : plantar left heel DFU 3  [FreeTextEntry1] : silver alginate  [] : No [FreeTextEntry9] : 528 [de-identified] : DFU 3 left heel  [de-identified] : Chao [FreeTextEntry6] : DFU 3 left heel  [FreeTextEntry7] : same  [de-identified] : 0 [de-identified] : 5cc [de-identified] : necrotic skin , subcutaneous , fat , fascia

## 2020-01-09 NOTE — PHYSICAL EXAM
[Normal Breath Sounds] : Normal breath sounds [0] : left 0 [1+] : left 1+ [Varicose Veins Of Lower Extremities] : bilaterally [Purpura] : purpura [Ankle Swelling On The Right] : mild [Petechiae] : petechiae [Skin Ulcer] : ulcer [Skin Induration] : induration [Alert] : alert [Oriented to Place] : oriented to place [Oriented to Person] : oriented to person [Calm] : calm [de-identified] : comfortable  [de-identified] : WNL [de-identified] : DFU 3 plantar left heel  [de-identified] : hammer toe [de-identified] : Diabetic neuropathy  [FreeTextEntry1] : Plantar Heel  [de-identified] : Post Debridement 2.6 x 1.7 x 0.7 [FreeTextEntry2] : 2.5 [FreeTextEntry3] : 1.5 [FreeTextEntry4] : 0.6 [de-identified] : Serosanguineous  [de-identified] : 10% [de-identified] : Silver Alginate [de-identified] : None [de-identified] : Cleansed with Normal saline\par  [TWNoteComboBox1] : Left [TWNoteComboBox4] : Small [TWNoteComboBox5] : No [TWNoteComboBox6] : Diabetic [de-identified] : other [de-identified] : Mild [de-identified] : None [de-identified] : Yes [de-identified] : 100% [TWNoteComboBox7] : Isabela [de-identified] : Ace wraps [de-identified] : 3x Weekly

## 2020-01-10 ENCOUNTER — OUTPATIENT (OUTPATIENT)
Dept: OUTPATIENT SERVICES | Facility: HOSPITAL | Age: 45
LOS: 1 days | Discharge: HOME | End: 2020-01-10
Payer: COMMERCIAL

## 2020-01-10 ENCOUNTER — APPOINTMENT (OUTPATIENT)
Dept: HYPERBARIC MEDICINE | Facility: HOSPITAL | Age: 45
End: 2020-01-10
Payer: COMMERCIAL

## 2020-01-10 VITALS
HEART RATE: 86 BPM | DIASTOLIC BLOOD PRESSURE: 77 MMHG | TEMPERATURE: 97.6 F | SYSTOLIC BLOOD PRESSURE: 143 MMHG | OXYGEN SATURATION: 99 % | RESPIRATION RATE: 18 BRPM

## 2020-01-10 VITALS
HEART RATE: 82 BPM | OXYGEN SATURATION: 100 % | SYSTOLIC BLOOD PRESSURE: 180 MMHG | TEMPERATURE: 97.1 F | DIASTOLIC BLOOD PRESSURE: 79 MMHG | RESPIRATION RATE: 18 BRPM

## 2020-01-10 DIAGNOSIS — E10.621 TYPE 1 DIABETES MELLITUS WITH FOOT ULCER: ICD-10-CM

## 2020-01-10 DIAGNOSIS — L97.422 NON-PRESSURE CHRONIC ULCER OF LEFT HEEL AND MIDFOOT WITH FAT LAYER EXPOSED: ICD-10-CM

## 2020-01-10 DIAGNOSIS — Z79.4 LONG TERM (CURRENT) USE OF INSULIN: ICD-10-CM

## 2020-01-10 DIAGNOSIS — I77.0 ARTERIOVENOUS FISTULA, ACQUIRED: Chronic | ICD-10-CM

## 2020-01-10 DIAGNOSIS — E11.621 TYPE 2 DIABETES MELLITUS WITH FOOT ULCER: ICD-10-CM

## 2020-01-10 PROCEDURE — 82962 GLUCOSE BLOOD TEST: CPT

## 2020-01-10 PROCEDURE — 99183 HYPERBARIC OXYGEN THERAPY: CPT

## 2020-01-10 PROCEDURE — G0277: CPT

## 2020-01-10 NOTE — PROCEDURE
[Outpatient] : Outpatient [Wheelchair] : wheelchair [THIS CHAMBER HAS BEEN CLEANED / DISINFECTED] : This chamber has been cleaned / disinfected according to local and hospital policy and procedure prior to this treatment. [Patient educated on the risks of SMOKING prior to HBOT with understanding] : Patient educated on the risks of SMOKING prior to HBOT with understanding [Patient demonstrated and verbalized proper technique for using air break mask] : Patient demonstrated and verbalized proper technique for using air break mask [Patient educated on the risks of CONSUMING ALCOHOL prior to HBOT with understanding] : Patient educated on the risks of CONSUMING ALCOHOL prior to HBOT with understanding [100% Cotton] : 100% cotton [Empty all pockets] : empty all pockets [No hair oils, wigs, hairpieces, pins] : no hair oils, wigs, hairpieces, pins  [No jewelry] : no jewelry  [Pre tx medications] : pre tx medications  [No make-up, creams] : no make-up, creams  [Hearing aid removed] : hearing aid removed [No matches, cigarettes, lighters] : no matches, cigarettes, lighters  [Ground bracelet on pt's wrist] : ground bracelet on pt's wrist  [Contacts removed] : contacts removed  [Dentures removed] : dentures removed [No reading material] : no reading material  [Bra, undergarments removed] : bra, undergarments removed  [Remove nail polish] : remove nail polish  [Ground Wire - VISUAL Verification - Intact/Free of Obstruction] : Ground Wire - VISUAL Verification - Intact/Free of Obstruction  [No contraindicated dressings] : no contraindicated dressings [Ground Continuity - Verified < 1 ohm w/ Wrist Strap Barb] : Ground Continuity - Verified < 1 ohm w/ Wrist Strap Barb [Number: ___] : Number: [unfilled] [Diagnosis: ___] : Diagnosis: [unfilled] [___] : Post-Dive: Value - [unfilled] mg/dL [____] : Post-Dive: Time - [unfilled] [Clear all fields] : clear all fields [] : No [FreeTextEntry3] : 90 [FreeTextEntry7] : 2515 [FreeTextEntry5] : 1925 [de-identified] : 1009 [FreeTextEntry9] : 6649 [de-identified] : 108 MIN

## 2020-01-10 NOTE — ADDENDUM
[FreeTextEntry1] : Drainage noticed on pt's dressing. RN notified. Pt clear to dive. \par Pt descended to 2.0 RIVAS @ 1.75 PSI/min without incident in chamber #1. \par Pt resting @ depth with chest rise and fall observed throughout tx. \par Pt ascended from depth without incident. \par Pt tolerated tx well\par Pt receive ace wrap post HBOT by RN.\par \par

## 2020-01-10 NOTE — ADDENDUM
[FreeTextEntry1] : DRAINAGE NOTED ON PT'S DRESSING PRIOR TO DESCENT. CHRN NOTIFIED. PT TO RECEIVE WC POST HBOT\par \par PT DESCENDED TO 2.0 RIVAS @ 1.75 PSI/MIN WITHOUT INCIDENT IN CHAMBER # 1. PT'S RESTING AT TX DEPTH WITH WOUND OFFLOADED. CHEST RISE AND FALL OBSERVED CHAMBERSIDE.\par \par PT ASCENDED FROM 2.0 RIVAS @ 1.75 PSI/MIN WITHOUT INCIDENT. PT TOLERATED TX WELL.\par \par PT TO RESUME TOMORROW 01/11/2020 FOR SAT TX.

## 2020-01-10 NOTE — PROCEDURE
[Outpatient] : Outpatient [Wheelchair] : wheelchair [THIS CHAMBER HAS BEEN CLEANED / DISINFECTED] : This chamber has been cleaned / disinfected according to local and hospital policy and procedure prior to this treatment. [Patient educated on the risks of SMOKING prior to HBOT with understanding] : Patient educated on the risks of SMOKING prior to HBOT with understanding [Patient demonstrated and verbalized proper technique for using air break mask] : Patient demonstrated and verbalized proper technique for using air break mask [Patient educated on the risks of CONSUMING ALCOHOL prior to HBOT with understanding] : Patient educated on the risks of CONSUMING ALCOHOL prior to HBOT with understanding [Empty all pockets] : empty all pockets [100% Cotton] : 100% cotton [No hair oils, wigs, hairpieces, pins] : no hair oils, wigs, hairpieces, pins  [Pre tx medications] : pre tx medications  [No make-up, creams] : no make-up, creams  [No jewelry] : no jewelry  [Dentures removed] : dentures removed [No matches, cigarettes, lighters] : no matches, cigarettes, lighters  [Hearing aid removed] : hearing aid removed [Ground bracelet on pt's wrist] : ground bracelet on pt's wrist  [Contacts removed] : contacts removed  [No reading material] : no reading material  [Remove nail polish] : remove nail polish  [Ground Wire - VISUAL Verification - Intact/Free of Obstruction] : Ground Wire - VISUAL Verification - Intact/Free of Obstruction  [Bra, undergarments removed] : bra, undergarments removed  [No contraindicated dressings] : no contraindicated dressings [Ground Continuity - Verified < 1 ohm w/ Wrist Strap Barb] : Ground Continuity - Verified < 1 ohm w/ Wrist Strap Barb [Number: ___] : Number: [unfilled] [Diagnosis: ___] : Diagnosis: [unfilled] [____] : Post-Dive: Time - [unfilled] [___] : Post-Dive: Value - [unfilled] mg/dL [Clear all fields] : clear all fields [] : No [FreeTextEntry1] : tx depth  [FreeTextEntry3] : 90  [FreeTextEntry9] : 1005 [FreeTextEntry7] : 6101 [FreeTextEntry5] : 3301 [de-identified] : 108 min  [de-identified] : 1013

## 2020-01-10 NOTE — ASSESSMENT
[No change from previous assessment] : No change from previous assessment [Patient prepared for dive] : Patient prepared for dive [Patient descended without problem for 9 minutes] : Patient descended without problem for 9 minutes [No dizziness or thirst] :  No dizziness or thirst [Patient undergoing HBO treatment for __________] : Patient undergoing HBO treatment for [unfilled] [Tolerating dive well] : Tolerating dive well [No ear problems] : No ear problems [Vital signs stable] : Vital signs stable [Respiratory Rate Stable] : Respiratory Rate Stable [No Chest Pain, shortness of breath] : No Chest Pain, shortness of breath [Tolerated Ascent well] : Tolerated Ascent well [No chest pain, shortness of breath, or ear pain] :  No chest pain, shortness of breath, or ear pain  [Vital Signs stable] : Vital Signs stable [No] : No [A physician was present throughout the entire HBOT] : A physician was present throughout the entire HBOT [Continue Treatment Plan] : Continue treatment plan [Clinically Stable] : Clinically stable [0] : 0 out of 10 [FreeTextEntry2] : None

## 2020-01-11 ENCOUNTER — APPOINTMENT (OUTPATIENT)
Dept: HYPERBARIC MEDICINE | Facility: HOSPITAL | Age: 45
End: 2020-01-11
Payer: COMMERCIAL

## 2020-01-11 ENCOUNTER — OUTPATIENT (OUTPATIENT)
Dept: OUTPATIENT SERVICES | Facility: HOSPITAL | Age: 45
LOS: 1 days | Discharge: HOME | End: 2020-01-11
Payer: COMMERCIAL

## 2020-01-11 VITALS
TEMPERATURE: 97.8 F | HEART RATE: 97 BPM | SYSTOLIC BLOOD PRESSURE: 121 MMHG | WEIGHT: 176 LBS | OXYGEN SATURATION: 100 % | BODY MASS INDEX: 27.62 KG/M2 | HEIGHT: 67 IN | RESPIRATION RATE: 8 BRPM | DIASTOLIC BLOOD PRESSURE: 72 MMHG

## 2020-01-11 VITALS
SYSTOLIC BLOOD PRESSURE: 101 MMHG | HEIGHT: 67 IN | DIASTOLIC BLOOD PRESSURE: 57 MMHG | RESPIRATION RATE: 16 BRPM | TEMPERATURE: 97.3 F | WEIGHT: 176 LBS | BODY MASS INDEX: 27.62 KG/M2 | OXYGEN SATURATION: 100 % | HEART RATE: 91 BPM

## 2020-01-11 DIAGNOSIS — Z79.4 LONG TERM (CURRENT) USE OF INSULIN: ICD-10-CM

## 2020-01-11 DIAGNOSIS — I83.93 ASYMPTOMATIC VARICOSE VEINS OF BILATERAL LOWER EXTREMITIES: ICD-10-CM

## 2020-01-11 DIAGNOSIS — E10.621 TYPE 1 DIABETES MELLITUS WITH FOOT ULCER: ICD-10-CM

## 2020-01-11 DIAGNOSIS — Z98.49 CATARACT EXTRACTION STATUS, UNSPECIFIED EYE: ICD-10-CM

## 2020-01-11 DIAGNOSIS — E10.29 TYPE 1 DIABETES MELLITUS WITH OTHER DIABETIC KIDNEY COMPLICATION: ICD-10-CM

## 2020-01-11 DIAGNOSIS — L97.422 NON-PRESSURE CHRONIC ULCER OF LEFT HEEL AND MIDFOOT WITH FAT LAYER EXPOSED: ICD-10-CM

## 2020-01-11 DIAGNOSIS — N19 UNSPECIFIED KIDNEY FAILURE: ICD-10-CM

## 2020-01-11 DIAGNOSIS — E11.621 TYPE 2 DIABETES MELLITUS WITH FOOT ULCER: ICD-10-CM

## 2020-01-11 DIAGNOSIS — E10.40 TYPE 1 DIABETES MELLITUS WITH DIABETIC NEUROPATHY, UNSPECIFIED: ICD-10-CM

## 2020-01-11 DIAGNOSIS — Z89.429 ACQUIRED ABSENCE OF OTHER TOE(S), UNSPECIFIED SIDE: ICD-10-CM

## 2020-01-11 DIAGNOSIS — L97.423 NON-PRESSURE CHRONIC ULCER OF LEFT HEEL AND MIDFOOT WITH NECROSIS OF MUSCLE: ICD-10-CM

## 2020-01-11 DIAGNOSIS — Z99.2 DEPENDENCE ON RENAL DIALYSIS: ICD-10-CM

## 2020-01-11 DIAGNOSIS — Z79.899 OTHER LONG TERM (CURRENT) DRUG THERAPY: ICD-10-CM

## 2020-01-11 DIAGNOSIS — E10.51 TYPE 1 DIABETES MELLITUS WITH DIABETIC PERIPHERAL ANGIOPATHY WITHOUT GANGRENE: ICD-10-CM

## 2020-01-11 DIAGNOSIS — I77.0 ARTERIOVENOUS FISTULA, ACQUIRED: Chronic | ICD-10-CM

## 2020-01-11 PROCEDURE — 82962 GLUCOSE BLOOD TEST: CPT

## 2020-01-11 PROCEDURE — 99183 HYPERBARIC OXYGEN THERAPY: CPT

## 2020-01-11 PROCEDURE — G0277: CPT

## 2020-01-11 NOTE — ASSESSMENT
[Patient undergoing HBO treatment for __________] : Patient undergoing HBO treatment for [unfilled] [Patient descended without problem for 9 minutes] : Patient descended without problem for 9 minutes [No dizziness or thirst] :  No dizziness or thirst [No ear problems] : No ear problems [No Chest Pain, shortness of breath] : No Chest Pain, shortness of breath [Tolerating dive well] : Tolerating dive well [Vital signs stable] : Vital signs stable [Tolerated Ascent well] : Tolerated Ascent well [No chest pain, shortness of breath, or ear pain] :  No chest pain, shortness of breath, or ear pain  [Respiratory Rate Stable] : Respiratory Rate Stable [A physician was present throughout the entire HBOT] : A physician was present throughout the entire HBOT [No] : No [Vital Signs stable] : Vital Signs stable [Continue Treatment Plan] : Continue treatment plan [0] : 0 out of 10 [Clinically Stable] : Clinically stable

## 2020-01-11 NOTE — PROCEDURE
[Outpatient] : Outpatient [Ambulatory] : Patient is ambulatory. [THIS CHAMBER HAS BEEN CLEANED / DISINFECTED] : This chamber has been cleaned / disinfected according to local and hospital policy and procedure prior to this treatment. [Patient demonstrated and verbalized proper technique for using air break mask] : Patient demonstrated and verbalized proper technique for using air break mask [Patient educated on the risks of SMOKING prior to HBOT with understanding] : Patient educated on the risks of SMOKING prior to HBOT with understanding [Patient educated on the risks of CONSUMING ALCOHOL prior to HBOT with understanding] : Patient educated on the risks of CONSUMING ALCOHOL prior to HBOT with understanding [Empty all pockets] : empty all pockets [100% Cotton] : 100% cotton [No hair oils, wigs, hairpieces, pins] : no hair oils, wigs, hairpieces, pins  [No make-up, creams] : no make-up, creams  [Pre tx medications] : pre tx medications  [No jewelry] : no jewelry  [No matches, cigarettes, lighters] : no matches, cigarettes, lighters  [Dentures removed] : dentures removed [Hearing aid removed] : hearing aid removed [Remove nail polish] : remove nail polish  [Ground bracelet on pt's wrist] : ground bracelet on pt's wrist  [Contacts removed] : contacts removed  [No reading material] : no reading material  [Bra, undergarments removed] : bra, undergarments removed  [No contraindicated dressings] : no contraindicated dressings [Ground Wire - VISUAL Verification - Intact/Free of Obstruction] : Ground Wire - VISUAL Verification - Intact/Free of Obstruction  [Ground Continuity - Verified < 1 ohm w/ Wrist Strap Barb] : Ground Continuity - Verified < 1 ohm w/ Wrist Strap Barb [Diagnosis: ___] : Diagnosis: [unfilled] [Number: ___] : Number: [unfilled] [____] : Post-Dive: Time - [unfilled] [___] : Post-Dive: Value - [unfilled] mg/dL [Clear all fields] : clear all fields [] : No [FreeTextEntry5] : 0032 [FreeTextEntry3] : 90 mins [FreeTextEntry1] : 2.0 [de-identified] : 108 mins [FreeTextEntry7] : 5407 [FreeTextEntry9] : 4305 [de-identified] : 0910

## 2020-01-11 NOTE — ADDENDUM
[FreeTextEntry1] : Pt descended to 2.0 RIVAS@ 1.75psi/min without incident. \par Pt resting @ depth with equal chest rise and fall observed  by chamberside. \par Pt ascended from 2.0 RIVAS@ 1.75psi/min without incident. \par Pt tolerated tx well. \par

## 2020-01-13 ENCOUNTER — OUTPATIENT (OUTPATIENT)
Dept: OUTPATIENT SERVICES | Facility: HOSPITAL | Age: 45
LOS: 1 days | Discharge: HOME | End: 2020-01-13
Payer: COMMERCIAL

## 2020-01-13 ENCOUNTER — APPOINTMENT (OUTPATIENT)
Dept: HYPERBARIC MEDICINE | Facility: HOSPITAL | Age: 45
End: 2020-01-13
Payer: COMMERCIAL

## 2020-01-13 VITALS
HEART RATE: 75 BPM | OXYGEN SATURATION: 100 % | RESPIRATION RATE: 18 BRPM | TEMPERATURE: 97.4 F | SYSTOLIC BLOOD PRESSURE: 184 MMHG | DIASTOLIC BLOOD PRESSURE: 78 MMHG

## 2020-01-13 VITALS
DIASTOLIC BLOOD PRESSURE: 64 MMHG | HEART RATE: 82 BPM | RESPIRATION RATE: 18 BRPM | TEMPERATURE: 97.3 F | SYSTOLIC BLOOD PRESSURE: 124 MMHG | OXYGEN SATURATION: 100 %

## 2020-01-13 VITALS — SYSTOLIC BLOOD PRESSURE: 177 MMHG | DIASTOLIC BLOOD PRESSURE: 79 MMHG

## 2020-01-13 DIAGNOSIS — I77.0 ARTERIOVENOUS FISTULA, ACQUIRED: Chronic | ICD-10-CM

## 2020-01-13 DIAGNOSIS — E10.621 TYPE 1 DIABETES MELLITUS WITH FOOT ULCER: ICD-10-CM

## 2020-01-13 DIAGNOSIS — E11.621 TYPE 2 DIABETES MELLITUS WITH FOOT ULCER: ICD-10-CM

## 2020-01-13 DIAGNOSIS — Z79.4 LONG TERM (CURRENT) USE OF INSULIN: ICD-10-CM

## 2020-01-13 DIAGNOSIS — L97.422 NON-PRESSURE CHRONIC ULCER OF LEFT HEEL AND MIDFOOT WITH FAT LAYER EXPOSED: ICD-10-CM

## 2020-01-13 PROCEDURE — 82962 GLUCOSE BLOOD TEST: CPT

## 2020-01-13 PROCEDURE — 99183 HYPERBARIC OXYGEN THERAPY: CPT

## 2020-01-13 PROCEDURE — G0277: CPT

## 2020-01-13 NOTE — ADDENDUM
[FreeTextEntry1] : PT RECEIVED DRESSING CHANGE PRIOR TO DESCENT.\par \par PT DESCENDED TO 2.0 RIVAS @ 1.75 PSI/MIN WITHOUT INCIDENT IN CHAMBER # 1. PT'S RESTING AT TX DEPTH WITH WOUND OFFLOADED. CHEST RISE AND FALL OBSERVED CHAMBERSIDE.\par \par PT ASCENDED FROM 2.0 RIVAS @ 1.75 PSI/MIN WITHOUT INCIDENT. PT TOLERATED TX WELL.\par

## 2020-01-13 NOTE — ASSESSMENT
[No dizziness or thirst] :  No dizziness or thirst [No change from previous assessment] : No change from previous assessment [Vital signs stable] : Vital signs stable [No ear problems] : No ear problems [Tolerating dive well] : Tolerating dive well [Respiratory Rate Stable] : Respiratory Rate Stable [No Chest Pain, shortness of breath] : No Chest Pain, shortness of breath [Tolerated Ascent well] : Tolerated Ascent well [No chest pain, shortness of breath, or ear pain] :  No chest pain, shortness of breath, or ear pain  [Vital Signs stable] : Vital Signs stable [No] : No [Clinically Stable] : Clinically stable [A physician was present throughout the entire HBOT] : A physician was present throughout the entire HBOT [Continue Treatment Plan] : Continue treatment plan [0] : 0 out of 10

## 2020-01-13 NOTE — ASSESSMENT
[Patient descended without problem for 9 minutes] : Patient descended without problem for 9 minutes [Patient prepared for dive] : Patient prepared for dive [Vital signs stable] : Vital signs stable [No ear problems] : No ear problems [No dizziness or thirst] :  No dizziness or thirst [No Chest Pain, shortness of breath] : No Chest Pain, shortness of breath [Tolerating dive well] : Tolerating dive well [Respiratory Rate Stable] : Respiratory Rate Stable [No chest pain, shortness of breath, or ear pain] :  No chest pain, shortness of breath, or ear pain  [Tolerated Ascent well] : Tolerated Ascent well [Vital Signs stable] : Vital Signs stable [A physician was present throughout the entire HBOT] : A physician was present throughout the entire HBOT [No] : No [0] : 0 out of 10 [Clinically Stable] : Clinically stable [Continue Treatment Plan] : Continue treatment plan

## 2020-01-13 NOTE — PROCEDURE
[Outpatient] : Outpatient [Wheelchair] : wheelchair [THIS CHAMBER HAS BEEN CLEANED / DISINFECTED] : This chamber has been cleaned / disinfected according to local and hospital policy and procedure prior to this treatment. [Patient educated on the risks of SMOKING prior to HBOT with understanding] : Patient educated on the risks of SMOKING prior to HBOT with understanding [Patient demonstrated and verbalized proper technique for using air break mask] : Patient demonstrated and verbalized proper technique for using air break mask [Patient educated on the risks of CONSUMING ALCOHOL prior to HBOT with understanding] : Patient educated on the risks of CONSUMING ALCOHOL prior to HBOT with understanding [100% Cotton] : 100% cotton [Pre tx medications] : pre tx medications  [No hair oils, wigs, hairpieces, pins] : no hair oils, wigs, hairpieces, pins  [Empty all pockets] : empty all pockets [No make-up, creams] : no make-up, creams  [No jewelry] : no jewelry  [No matches, cigarettes, lighters] : no matches, cigarettes, lighters  [Dentures removed] : dentures removed [Hearing aid removed] : hearing aid removed [Contacts removed] : contacts removed  [Ground bracelet on pt's wrist] : ground bracelet on pt's wrist  [Remove nail polish] : remove nail polish  [Bra, undergarments removed] : bra, undergarments removed  [No reading material] : no reading material  [Ground Continuity - Verified < 1 ohm w/ Wrist Strap Barb] : Ground Continuity - Verified < 1 ohm w/ Wrist Strap Barb [No contraindicated dressings] : no contraindicated dressings [Ground Wire - VISUAL Verification - Intact/Free of Obstruction] : Ground Wire - VISUAL Verification - Intact/Free of Obstruction  [Number: ___] : Number: [unfilled] [Diagnosis: ___] : Diagnosis: [unfilled] [____] : Post-Dive: Time - [unfilled] [___] : Post-Dive: Value - [unfilled] mg/dL [Clear all fields] : clear all fields [] : No [FreeTextEntry3] : 90 [FreeTextEntry9] : 9247 [FreeTextEntry5] : 2378 [FreeTextEntry7] : 6320 [de-identified] : 108 min [de-identified] : 6925

## 2020-01-13 NOTE — PROCEDURE
[Wheelchair] : wheelchair [Outpatient] : Outpatient [THIS CHAMBER HAS BEEN CLEANED / DISINFECTED] : This chamber has been cleaned / disinfected according to local and hospital policy and procedure prior to this treatment. [Patient demonstrated and verbalized proper technique for using air break mask] : Patient demonstrated and verbalized proper technique for using air break mask [Patient educated on the risks of CONSUMING ALCOHOL prior to HBOT with understanding] : Patient educated on the risks of CONSUMING ALCOHOL prior to HBOT with understanding [Patient educated on the risks of SMOKING prior to HBOT with understanding] : Patient educated on the risks of SMOKING prior to HBOT with understanding [100% Cotton] : 100% cotton [Empty all pockets] : empty all pockets [No hair oils, wigs, hairpieces, pins] : no hair oils, wigs, hairpieces, pins  [Pre tx medications] : pre tx medications  [No make-up, creams] : no make-up, creams  [No matches, cigarettes, lighters] : no matches, cigarettes, lighters  [No jewelry] : no jewelry  [Hearing aid removed] : hearing aid removed [Dentures removed] : dentures removed [Ground bracelet on pt's wrist] : ground bracelet on pt's wrist  [Contacts removed] : contacts removed  [Remove nail polish] : remove nail polish  [No reading material] : no reading material  [Bra, undergarments removed] : bra, undergarments removed  [No contraindicated dressings] : no contraindicated dressings [Ground Wire - VISUAL Verification - Intact/Free of Obstruction] : Ground Wire - VISUAL Verification - Intact/Free of Obstruction  [Ground Continuity - Verified < 1 ohm w/ Wrist Strap Barb] : Ground Continuity - Verified < 1 ohm w/ Wrist Strap Barb [Diagnosis: ___] : Diagnosis: [unfilled] [Number: ___] : Number: [unfilled] [____] : Post-Dive: Time - [unfilled] [___] : Post-Dive: Value - [unfilled] mg/dL [Clear all fields] : clear all fields [] : No [FreeTextEntry3] : 90 [FreeTextEntry5] : 5897 [FreeTextEntry7] : 5385 [FreeTextEntry9] : 1517 [de-identified] : 0949 [de-identified] : 108 MIN

## 2020-01-13 NOTE — ADDENDUM
[FreeTextEntry1] : No drainage noted on Pt's bandage. \par Pt descended to 2.0 RIVAS @ 1.75 PSI/min without incident in chamber #1 \par Pt resting @ depth with chest rise and fall observed throughout tx. \par Pt ascended from 2.0 RIVAS @ 1.75 PSI/min without incident.\par Pt tolerated tx well. Pt received ace wrap post HBO tx. \par \par

## 2020-01-14 ENCOUNTER — OUTPATIENT (OUTPATIENT)
Dept: OUTPATIENT SERVICES | Facility: HOSPITAL | Age: 45
LOS: 1 days | Discharge: HOME | End: 2020-01-14
Payer: COMMERCIAL

## 2020-01-14 ENCOUNTER — APPOINTMENT (OUTPATIENT)
Dept: HYPERBARIC MEDICINE | Facility: HOSPITAL | Age: 45
End: 2020-01-14
Payer: COMMERCIAL

## 2020-01-14 VITALS
RESPIRATION RATE: 16 BRPM | TEMPERATURE: 97.4 F | DIASTOLIC BLOOD PRESSURE: 71 MMHG | SYSTOLIC BLOOD PRESSURE: 133 MMHG | HEART RATE: 74 BPM | OXYGEN SATURATION: 100 %

## 2020-01-14 VITALS
TEMPERATURE: 97.8 F | RESPIRATION RATE: 16 BRPM | DIASTOLIC BLOOD PRESSURE: 64 MMHG | HEIGHT: 67 IN | BODY MASS INDEX: 27.62 KG/M2 | SYSTOLIC BLOOD PRESSURE: 110 MMHG | HEART RATE: 76 BPM | OXYGEN SATURATION: 100 % | WEIGHT: 176 LBS

## 2020-01-14 DIAGNOSIS — E10.621 TYPE 1 DIABETES MELLITUS WITH FOOT ULCER: ICD-10-CM

## 2020-01-14 DIAGNOSIS — Z79.4 LONG TERM (CURRENT) USE OF INSULIN: ICD-10-CM

## 2020-01-14 DIAGNOSIS — I77.0 ARTERIOVENOUS FISTULA, ACQUIRED: Chronic | ICD-10-CM

## 2020-01-14 DIAGNOSIS — L97.422 NON-PRESSURE CHRONIC ULCER OF LEFT HEEL AND MIDFOOT WITH FAT LAYER EXPOSED: ICD-10-CM

## 2020-01-14 DIAGNOSIS — E11.621 TYPE 2 DIABETES MELLITUS WITH FOOT ULCER: ICD-10-CM

## 2020-01-14 PROCEDURE — G0277: CPT

## 2020-01-14 PROCEDURE — 99183 HYPERBARIC OXYGEN THERAPY: CPT

## 2020-01-14 PROCEDURE — 82962 GLUCOSE BLOOD TEST: CPT

## 2020-01-14 NOTE — ASSESSMENT
[Patient prepared for dive] : Patient prepared for dive [No dizziness or thirst] :  No dizziness or thirst [Patient descended without problem for 9 minutes] : Patient descended without problem for 9 minutes [Tolerating dive well] : Tolerating dive well [Vital signs stable] : Vital signs stable [No ear problems] : No ear problems [No Chest Pain, shortness of breath] : No Chest Pain, shortness of breath [No chest pain, shortness of breath, or ear pain] :  No chest pain, shortness of breath, or ear pain  [Respiratory Rate Stable] : Respiratory Rate Stable [Tolerated Ascent well] : Tolerated Ascent well [Vital Signs stable] : Vital Signs stable [No] : No [Clinically Stable] : Clinically stable [Continue Treatment Plan] : Continue treatment plan [A physician was present throughout the entire HBOT] : A physician was present throughout the entire HBOT [0] : 0 out of 10

## 2020-01-14 NOTE — ADDENDUM
[FreeTextEntry1] : Pt descended to 2.0 MARTÍN @ 1.75psi. min without incident. \par Pt resting @ depth with equal chest rise and fall observed by chamberside. \par pt tolerated tx well. \par Pt ascended from 2.0 martín  1.75psi/min without incident. \par Pt tolerated tx well. pt received ace wrap by LPN  post tx.

## 2020-01-14 NOTE — PROCEDURE
[Outpatient] : Outpatient [Wheelchair] : wheelchair [THIS CHAMBER HAS BEEN CLEANED / DISINFECTED] : This chamber has been cleaned / disinfected according to local and hospital policy and procedure prior to this treatment. [Patient demonstrated and verbalized proper technique for using air break mask] : Patient demonstrated and verbalized proper technique for using air break mask [Patient educated on the risks of SMOKING prior to HBOT with understanding] : Patient educated on the risks of SMOKING prior to HBOT with understanding [Patient educated on the risks of CONSUMING ALCOHOL prior to HBOT with understanding] : Patient educated on the risks of CONSUMING ALCOHOL prior to HBOT with understanding [100% Cotton] : 100% cotton [Empty all pockets] : empty all pockets [No hair oils, wigs, hairpieces, pins] : no hair oils, wigs, hairpieces, pins  [Pre tx medications] : pre tx medications  [No make-up, creams] : no make-up, creams  [No jewelry] : no jewelry  [No matches, cigarettes, lighters] : no matches, cigarettes, lighters  [Dentures removed] : dentures removed [Hearing aid removed] : hearing aid removed [Ground bracelet on pt's wrist] : ground bracelet on pt's wrist  [Contacts removed] : contacts removed  [Remove nail polish] : remove nail polish  [No reading material] : no reading material  [Bra, undergarments removed] : bra, undergarments removed  [No contraindicated dressings] : no contraindicated dressings [Ground Wire - VISUAL Verification - Intact/Free of Obstruction] : Ground Wire - VISUAL Verification - Intact/Free of Obstruction  [Ground Continuity - Verified < 1 ohm w/ Wrist Strap Barb] : Ground Continuity - Verified < 1 ohm w/ Wrist Strap Barb [Diagnosis: ___] : Diagnosis: [unfilled] [Number: ___] : Number: [unfilled] [____] : Post-Dive: Time - [unfilled] [___] : Post-Dive: Value - [unfilled] mg/dL [Clear all fields] : clear all fields [FreeTextEntry1] : 2.0 [FreeTextEntry3] : 90 mins [] : No [FreeTextEntry9] : 4738 [FreeTextEntry5] : 8202 [FreeTextEntry7] : 9040 [de-identified] : 108 mins [de-identified] : 7736

## 2020-01-15 ENCOUNTER — APPOINTMENT (OUTPATIENT)
Dept: HYPERBARIC MEDICINE | Facility: HOSPITAL | Age: 45
End: 2020-01-15
Payer: COMMERCIAL

## 2020-01-15 ENCOUNTER — OUTPATIENT (OUTPATIENT)
Dept: OUTPATIENT SERVICES | Facility: HOSPITAL | Age: 45
LOS: 1 days | Discharge: HOME | End: 2020-01-15
Payer: COMMERCIAL

## 2020-01-15 VITALS
OXYGEN SATURATION: 98 % | RESPIRATION RATE: 20 BRPM | SYSTOLIC BLOOD PRESSURE: 92 MMHG | BODY MASS INDEX: 24.17 KG/M2 | TEMPERATURE: 98 F | DIASTOLIC BLOOD PRESSURE: 60 MMHG | WEIGHT: 154 LBS | HEART RATE: 80 BPM | HEIGHT: 67 IN

## 2020-01-15 DIAGNOSIS — I77.0 ARTERIOVENOUS FISTULA, ACQUIRED: Chronic | ICD-10-CM

## 2020-01-15 DIAGNOSIS — E11.621 TYPE 2 DIABETES MELLITUS WITH FOOT ULCER: ICD-10-CM

## 2020-01-15 PROCEDURE — 15275 SKIN SUB GRAFT FACE/NK/HF/G: CPT

## 2020-01-16 ENCOUNTER — APPOINTMENT (OUTPATIENT)
Dept: HYPERBARIC MEDICINE | Facility: HOSPITAL | Age: 45
End: 2020-01-16
Payer: COMMERCIAL

## 2020-01-16 ENCOUNTER — OUTPATIENT (OUTPATIENT)
Dept: OUTPATIENT SERVICES | Facility: HOSPITAL | Age: 45
LOS: 1 days | Discharge: HOME | End: 2020-01-16
Payer: COMMERCIAL

## 2020-01-16 VITALS
SYSTOLIC BLOOD PRESSURE: 93 MMHG | TEMPERATURE: 97 F | OXYGEN SATURATION: 100 % | DIASTOLIC BLOOD PRESSURE: 53 MMHG | HEART RATE: 83 BPM | RESPIRATION RATE: 18 BRPM

## 2020-01-16 VITALS
RESPIRATION RATE: 18 BRPM | OXYGEN SATURATION: 97 % | DIASTOLIC BLOOD PRESSURE: 58 MMHG | TEMPERATURE: 97 F | HEART RATE: 83 BPM | SYSTOLIC BLOOD PRESSURE: 93 MMHG

## 2020-01-16 VITALS
DIASTOLIC BLOOD PRESSURE: 75 MMHG | HEART RATE: 64 BPM | SYSTOLIC BLOOD PRESSURE: 120 MMHG | RESPIRATION RATE: 18 BRPM | OXYGEN SATURATION: 97 % | TEMPERATURE: 97.1 F

## 2020-01-16 DIAGNOSIS — E10.621 TYPE 1 DIABETES MELLITUS WITH FOOT ULCER: ICD-10-CM

## 2020-01-16 DIAGNOSIS — Z79.4 LONG TERM (CURRENT) USE OF INSULIN: ICD-10-CM

## 2020-01-16 DIAGNOSIS — I83.93 ASYMPTOMATIC VARICOSE VEINS OF BILATERAL LOWER EXTREMITIES: ICD-10-CM

## 2020-01-16 DIAGNOSIS — N19 UNSPECIFIED KIDNEY FAILURE: ICD-10-CM

## 2020-01-16 DIAGNOSIS — E11.621 TYPE 2 DIABETES MELLITUS WITH FOOT ULCER: ICD-10-CM

## 2020-01-16 DIAGNOSIS — L97.422 NON-PRESSURE CHRONIC ULCER OF LEFT HEEL AND MIDFOOT WITH FAT LAYER EXPOSED: ICD-10-CM

## 2020-01-16 DIAGNOSIS — Z89.429 ACQUIRED ABSENCE OF OTHER TOE(S), UNSPECIFIED SIDE: ICD-10-CM

## 2020-01-16 DIAGNOSIS — E10.40 TYPE 1 DIABETES MELLITUS WITH DIABETIC NEUROPATHY, UNSPECIFIED: ICD-10-CM

## 2020-01-16 DIAGNOSIS — I77.0 ARTERIOVENOUS FISTULA, ACQUIRED: Chronic | ICD-10-CM

## 2020-01-16 DIAGNOSIS — E10.51 TYPE 1 DIABETES MELLITUS WITH DIABETIC PERIPHERAL ANGIOPATHY WITHOUT GANGRENE: ICD-10-CM

## 2020-01-16 DIAGNOSIS — E10.29 TYPE 1 DIABETES MELLITUS WITH OTHER DIABETIC KIDNEY COMPLICATION: ICD-10-CM

## 2020-01-16 DIAGNOSIS — Z98.49 CATARACT EXTRACTION STATUS, UNSPECIFIED EYE: ICD-10-CM

## 2020-01-16 DIAGNOSIS — Z99.2 DEPENDENCE ON RENAL DIALYSIS: ICD-10-CM

## 2020-01-16 DIAGNOSIS — Z79.899 OTHER LONG TERM (CURRENT) DRUG THERAPY: ICD-10-CM

## 2020-01-16 PROCEDURE — 99183 HYPERBARIC OXYGEN THERAPY: CPT

## 2020-01-16 PROCEDURE — 82962 GLUCOSE BLOOD TEST: CPT

## 2020-01-16 PROCEDURE — G0277: CPT

## 2020-01-17 ENCOUNTER — APPOINTMENT (OUTPATIENT)
Dept: HYPERBARIC MEDICINE | Facility: HOSPITAL | Age: 45
End: 2020-01-17

## 2020-01-17 NOTE — ADDENDUM
[FreeTextEntry1] : -No drainage noticed on pt's dressing. Pt to receive ace wrap post HBOT. \par Pt descended to depth without incident in chamber # 1. \par Pt is resting @ depth with chest rise and fall observed @ chamber side.\par Pt ascended from depth without incident. \par Pt tolerated tx well\par Pt receive wound care post HBOT by RN.\par \par                                                                                              \par

## 2020-01-17 NOTE — REVIEW OF SYSTEMS
[Joint Stiffness] : joint stiffness [Skin Wound] : skin wound [Negative] : Cardiovascular [Chills] : no chills [Fever] : no fever [Loss Of Hearing] : no hearing loss [Shortness Of Breath] : no shortness of breath [Abdominal Pain] : no abdominal pain [Anxiety] : no anxiety [Easy Bleeding] : no tendency for easy bleeding [de-identified] : IDDM with neuropathy  [de-identified] : DFU 3 plantar left heel , skin, subcutaneous , fat , fascia  [de-identified] : DEE DEE

## 2020-01-17 NOTE — PROCEDURE
[] : No [FreeTextEntry1] : Tx depth  [FreeTextEntry3] : 90 [FreeTextEntry7] : 6979 [FreeTextEntry5] : 9732 [de-identified] : 1057 [FreeTextEntry9] : 3153 [de-identified] : 108 min

## 2020-01-17 NOTE — PROCEDURE
[Saline] : saline [Scalpel] : scalpel [Sharp scissors] : sharp scissors [Fenestrated] : fenestrated [Other: ___] : [unfilled] [Apligraf] : apligraf [Hydrated with saline] : hydrated with saline [____ % was used] : and [unfilled] % was used [____ % was discarded] : and [unfilled] % was discarded [FreeTextEntry1] : wound veil/ silver alginate  [] : No [FreeTextEntry9] : 10:37am [de-identified] : DFU 3 posterior left heel  [de-identified] : Chao [FreeTextEntry6] : DFU 3 posterior left heel  [FreeTextEntry7] : same  [de-identified] : 5cc [de-identified] : 0 [de-identified] : nely

## 2020-01-17 NOTE — ASSESSMENT
[Verbal] : Verbal [Patient] : Patient [Fair - mild discomfort, physical impairment, low acceptance] : Fair - mild discomfort, physical impairment, low acceptance [Verbalizes knowledge/Understanding] : Verbalizes knowledge/understanding [Dressing changes] : dressing changes [Foot Care] : foot care [Signs and symptoms of infection] : sign and symptoms of infection [How and When to Call] : how and when to call [Hyperbaric Therapy] : hyperbaric therapy [Off-loading] : off-loading [Compression Therapy] : compression therapy [Patient responsibility to plan of care] : patient responsibility to plan of care [] : Yes [Stable] : stable [Home] : Home [Wheelchair] : Wheelchair [Not Applicable - Long Term Care/Home Health Agency] : Long Term Care/Home Health Agency: Not Applicable [FreeTextEntry4] : Patient has completed 33/40 HBOT\par 1st application of 1 unit of 44 sq/cm Apligraf applied today\par ITM#: [id number] , LOT#: [id number]  Exp: 1/17/20\par 100 % applied,  0% discarded\par 1 unit apligraf ordered on 01/17/20 for pts next assessment.\par Reconstituted with 0.9% NS, LOT#: [id number]\par 2nd application of 1 unit of Apligraf to be applied at next visit. Authorization submitted today\par F/U 1/16/20 for HBOT [FreeTextEntry2] : Infection Prevention\par Apligraf\par HBOT\par Edema Control\par F/U

## 2020-01-20 ENCOUNTER — OUTPATIENT (OUTPATIENT)
Dept: OUTPATIENT SERVICES | Facility: HOSPITAL | Age: 45
LOS: 1 days | Discharge: HOME | End: 2020-01-20
Payer: COMMERCIAL

## 2020-01-20 ENCOUNTER — APPOINTMENT (OUTPATIENT)
Dept: HYPERBARIC MEDICINE | Facility: HOSPITAL | Age: 45
End: 2020-01-20
Payer: COMMERCIAL

## 2020-01-20 VITALS
OXYGEN SATURATION: 100 % | TEMPERATURE: 97.5 F | HEART RATE: 80 BPM | SYSTOLIC BLOOD PRESSURE: 138 MMHG | RESPIRATION RATE: 16 BRPM | DIASTOLIC BLOOD PRESSURE: 77 MMHG

## 2020-01-20 VITALS
HEART RATE: 71 BPM | OXYGEN SATURATION: 100 % | SYSTOLIC BLOOD PRESSURE: 173 MMHG | RESPIRATION RATE: 16 BRPM | TEMPERATURE: 97.9 F | DIASTOLIC BLOOD PRESSURE: 80 MMHG

## 2020-01-20 DIAGNOSIS — E10.621 TYPE 1 DIABETES MELLITUS WITH FOOT ULCER: ICD-10-CM

## 2020-01-20 DIAGNOSIS — I77.0 ARTERIOVENOUS FISTULA, ACQUIRED: Chronic | ICD-10-CM

## 2020-01-20 DIAGNOSIS — E11.621 TYPE 2 DIABETES MELLITUS WITH FOOT ULCER: ICD-10-CM

## 2020-01-20 DIAGNOSIS — L97.422 NON-PRESSURE CHRONIC ULCER OF LEFT HEEL AND MIDFOOT WITH FAT LAYER EXPOSED: ICD-10-CM

## 2020-01-20 DIAGNOSIS — Z79.4 LONG TERM (CURRENT) USE OF INSULIN: ICD-10-CM

## 2020-01-20 PROCEDURE — G0277: CPT

## 2020-01-20 PROCEDURE — 99183 HYPERBARIC OXYGEN THERAPY: CPT

## 2020-01-20 PROCEDURE — 82962 GLUCOSE BLOOD TEST: CPT

## 2020-01-21 ENCOUNTER — OUTPATIENT (OUTPATIENT)
Dept: OUTPATIENT SERVICES | Facility: HOSPITAL | Age: 45
LOS: 1 days | Discharge: HOME | End: 2020-01-21
Payer: COMMERCIAL

## 2020-01-21 ENCOUNTER — APPOINTMENT (OUTPATIENT)
Dept: HYPERBARIC MEDICINE | Facility: HOSPITAL | Age: 45
End: 2020-01-21
Payer: COMMERCIAL

## 2020-01-21 VITALS
TEMPERATURE: 97.9 F | HEART RATE: 74 BPM | SYSTOLIC BLOOD PRESSURE: 149 MMHG | RESPIRATION RATE: 18 BRPM | DIASTOLIC BLOOD PRESSURE: 81 MMHG | OXYGEN SATURATION: 98 %

## 2020-01-21 VITALS
RESPIRATION RATE: 18 BRPM | SYSTOLIC BLOOD PRESSURE: 110 MMHG | OXYGEN SATURATION: 100 % | HEART RATE: 79 BPM | WEIGHT: 154 LBS | BODY MASS INDEX: 24.17 KG/M2 | DIASTOLIC BLOOD PRESSURE: 61 MMHG | TEMPERATURE: 97.4 F | HEIGHT: 67 IN

## 2020-01-21 DIAGNOSIS — E10.621 TYPE 1 DIABETES MELLITUS WITH FOOT ULCER: ICD-10-CM

## 2020-01-21 DIAGNOSIS — Z79.4 LONG TERM (CURRENT) USE OF INSULIN: ICD-10-CM

## 2020-01-21 DIAGNOSIS — I77.0 ARTERIOVENOUS FISTULA, ACQUIRED: Chronic | ICD-10-CM

## 2020-01-21 DIAGNOSIS — L97.422 NON-PRESSURE CHRONIC ULCER OF LEFT HEEL AND MIDFOOT WITH FAT LAYER EXPOSED: ICD-10-CM

## 2020-01-21 DIAGNOSIS — E11.621 TYPE 2 DIABETES MELLITUS WITH FOOT ULCER: ICD-10-CM

## 2020-01-21 PROCEDURE — 99183 HYPERBARIC OXYGEN THERAPY: CPT

## 2020-01-21 PROCEDURE — G0277: CPT

## 2020-01-21 PROCEDURE — 82962 GLUCOSE BLOOD TEST: CPT

## 2020-01-21 NOTE — ASSESSMENT
[Patient prepared for dive] : Patient prepared for dive [No dizziness or thirst] :  No dizziness or thirst [Patient descended without problem for 9 minutes] : Patient descended without problem for 9 minutes [Vital signs stable] : Vital signs stable [No ear problems] : No ear problems [Tolerating dive well] : Tolerating dive well [No Chest Pain, shortness of breath] : No Chest Pain, shortness of breath [Respiratory Rate Stable] : Respiratory Rate Stable [Tolerated Ascent well] : Tolerated Ascent well [No chest pain, shortness of breath, or ear pain] :  No chest pain, shortness of breath, or ear pain  [Vital Signs stable] : Vital Signs stable [A physician was present throughout the entire HBOT] : A physician was present throughout the entire HBOT [No] : No [Clinically Stable] : Clinically stable [Continue Treatment Plan] : Continue treatment plan [0] : 0 out of 10

## 2020-01-21 NOTE — ADDENDUM
[FreeTextEntry1] : Pt's BGL low. CHRN notified. Pt given 16g of glucose via 8oz juice Pt BGL retested. Pt BGL still low. RN notified. Pt given Pt given 15g of glucose via gel. Pt rested and retested. Pt BGL now within Acceptable limits for HBOT \par Due to contraindicated dressing pt received Dressing change prior o tx. by LPN. \par Pt descended to 2.0 RIVAS@ 1.75psi/min without incident. \par Pt resting @ depth with equal chest rise and fall observed  by chamberside. \par pt ascended from 2.0 RIVAS@ 1.75psi/min without incident. \par pt tolerated tx well. [facility]

## 2020-01-21 NOTE — PROCEDURE
[Outpatient] : Outpatient [Ambulatory] : Patient is ambulatory. [THIS CHAMBER HAS BEEN CLEANED / DISINFECTED] : This chamber has been cleaned / disinfected according to local and hospital policy and procedure prior to this treatment. [Patient educated on the risks of SMOKING prior to HBOT with understanding] : Patient educated on the risks of SMOKING prior to HBOT with understanding [Patient demonstrated and verbalized proper technique for using air break mask] : Patient demonstrated and verbalized proper technique for using air break mask [Patient educated on the risks of CONSUMING ALCOHOL prior to HBOT with understanding] : Patient educated on the risks of CONSUMING ALCOHOL prior to HBOT with understanding [Pre tx medications] : pre tx medications  [Empty all pockets] : empty all pockets [100% Cotton] : 100% cotton [No hair oils, wigs, hairpieces, pins] : no hair oils, wigs, hairpieces, pins  [No jewelry] : no jewelry  [No make-up, creams] : no make-up, creams  [No matches, cigarettes, lighters] : no matches, cigarettes, lighters  [Hearing aid removed] : hearing aid removed [Dentures removed] : dentures removed [Contacts removed] : contacts removed  [Remove nail polish] : remove nail polish  [Ground bracelet on pt's wrist] : ground bracelet on pt's wrist  [No reading material] : no reading material  [Bra, undergarments removed] : bra, undergarments removed  [No contraindicated dressings] : no contraindicated dressings [Ground Wire - VISUAL Verification - Intact/Free of Obstruction] : Ground Wire - VISUAL Verification - Intact/Free of Obstruction  [Ground Continuity - Verified < 1 ohm w/ Wrist Strap Barb] : Ground Continuity - Verified < 1 ohm w/ Wrist Strap Barb [Number: ___] : Number: [unfilled] [Diagnosis: ___] : Diagnosis: [unfilled] [____] : Post-Dive: Time - [unfilled] [___] : Post-Dive: Value - [unfilled] mg/dL [Clear all fields] : clear all fields [] : No [FreeTextEntry5] : 3774 [FreeTextEntry3] : 90 mins [FreeTextEntry1] : 2.0 [FreeTextEntry9] : 8750 [de-identified] : 1020 [FreeTextEntry7] : 5838 [de-identified] : 108 mins

## 2020-01-22 ENCOUNTER — APPOINTMENT (OUTPATIENT)
Dept: HYPERBARIC MEDICINE | Facility: HOSPITAL | Age: 45
End: 2020-01-22
Payer: COMMERCIAL

## 2020-01-22 ENCOUNTER — OUTPATIENT (OUTPATIENT)
Dept: OUTPATIENT SERVICES | Facility: HOSPITAL | Age: 45
LOS: 1 days | Discharge: HOME | End: 2020-01-22
Payer: COMMERCIAL

## 2020-01-22 VITALS
OXYGEN SATURATION: 98 % | HEART RATE: 90 BPM | HEIGHT: 67 IN | WEIGHT: 180 LBS | TEMPERATURE: 97 F | SYSTOLIC BLOOD PRESSURE: 107 MMHG | RESPIRATION RATE: 18 BRPM | DIASTOLIC BLOOD PRESSURE: 67 MMHG | BODY MASS INDEX: 28.25 KG/M2

## 2020-01-22 VITALS
BODY MASS INDEX: 28.25 KG/M2 | HEIGHT: 67 IN | HEART RATE: 90 BPM | WEIGHT: 180 LBS | TEMPERATURE: 97 F | RESPIRATION RATE: 18 BRPM | OXYGEN SATURATION: 98 % | DIASTOLIC BLOOD PRESSURE: 67 MMHG | SYSTOLIC BLOOD PRESSURE: 107 MMHG

## 2020-01-22 DIAGNOSIS — E11.621 TYPE 2 DIABETES MELLITUS WITH FOOT ULCER: ICD-10-CM

## 2020-01-22 DIAGNOSIS — I77.0 ARTERIOVENOUS FISTULA, ACQUIRED: Chronic | ICD-10-CM

## 2020-01-22 PROCEDURE — 15275 SKIN SUB GRAFT FACE/NK/HF/G: CPT

## 2020-01-22 NOTE — ADDENDUM
[FreeTextEntry1] : No drainage noted on Pt's bandage. Pt's food elevated  and offloaded using wedge. \par Pt descended to 2.0 RIVAS @ 1.75 PSI/min without incident in chamber #1. \par Pt resting @ depth with chest rise and fall observed throughout tx. \par Pt ascended from depth without incident. \par Pt tolerated tx well\par Pt receive ace wrap post HBOT by RN.\par

## 2020-01-22 NOTE — PROCEDURE
[Outpatient] : Outpatient [Ambulatory] : Patient is ambulatory. [THIS CHAMBER HAS BEEN CLEANED / DISINFECTED] : This chamber has been cleaned / disinfected according to local and hospital policy and procedure prior to this treatment. [Patient demonstrated and verbalized proper technique for using air break mask] : Patient demonstrated and verbalized proper technique for using air break mask [Patient educated on the risks of SMOKING prior to HBOT with understanding] : Patient educated on the risks of SMOKING prior to HBOT with understanding [Patient educated on the risks of CONSUMING ALCOHOL prior to HBOT with understanding] : Patient educated on the risks of CONSUMING ALCOHOL prior to HBOT with understanding [100% Cotton] : 100% cotton [Empty all pockets] : empty all pockets [No hair oils, wigs, hairpieces, pins] : no hair oils, wigs, hairpieces, pins  [Pre tx medications] : pre tx medications  [No make-up, creams] : no make-up, creams  [No jewelry] : no jewelry  [No matches, cigarettes, lighters] : no matches, cigarettes, lighters  [Dentures removed] : dentures removed [Hearing aid removed] : hearing aid removed [Ground bracelet on pt's wrist] : ground bracelet on pt's wrist  [Contacts removed] : contacts removed  [Remove nail polish] : remove nail polish  [No reading material] : no reading material  [Bra, undergarments removed] : bra, undergarments removed  [No contraindicated dressings] : no contraindicated dressings [Ground Wire - VISUAL Verification - Intact/Free of Obstruction] : Ground Wire - VISUAL Verification - Intact/Free of Obstruction  [Ground Continuity - Verified < 1 ohm w/ Wrist Strap Barb] : Ground Continuity - Verified < 1 ohm w/ Wrist Strap Barb [Number: ___] : Number: [unfilled] [Diagnosis: ___] : Diagnosis: [unfilled] [____] : Post-Dive: Time - [unfilled] [___] : Post-Dive: Value - [unfilled] mg/dL [Clear all fields] : clear all fields [] : No [FreeTextEntry1] : tx depth  [FreeTextEntry3] : 90 min  [FreeTextEntry7] : 7226 [FreeTextEntry5] : 3241 [de-identified] : 108 min  [FreeTextEntry9] : 0852 [de-identified] : 3714

## 2020-01-22 NOTE — ASSESSMENT
[Patient prepared for dive] : Patient prepared for dive [Patient descended without problem for 9 minutes] : Patient descended without problem for 9 minutes [No ear problems] : No ear problems [No dizziness or thirst] :  No dizziness or thirst [Vital signs stable] : Vital signs stable [No Chest Pain, shortness of breath] : No Chest Pain, shortness of breath [Tolerating dive well] : Tolerating dive well [No chest pain, shortness of breath, or ear pain] :  No chest pain, shortness of breath, or ear pain  [Respiratory Rate Stable] : Respiratory Rate Stable [Tolerated Ascent well] : Tolerated Ascent well [Vital Signs stable] : Vital Signs stable [A physician was present throughout the entire HBOT] : A physician was present throughout the entire HBOT [Clinically Stable] : Clinically stable [No] : No [0] : 0 out of 10 [Continue Treatment Plan] : Continue treatment plan

## 2020-01-23 ENCOUNTER — OUTPATIENT (OUTPATIENT)
Dept: OUTPATIENT SERVICES | Facility: HOSPITAL | Age: 45
LOS: 1 days | Discharge: HOME | End: 2020-01-23
Payer: COMMERCIAL

## 2020-01-23 ENCOUNTER — APPOINTMENT (OUTPATIENT)
Dept: HYPERBARIC MEDICINE | Facility: HOSPITAL | Age: 45
End: 2020-01-23
Payer: COMMERCIAL

## 2020-01-23 VITALS
WEIGHT: 180 LBS | SYSTOLIC BLOOD PRESSURE: 160 MMHG | BODY MASS INDEX: 28.25 KG/M2 | HEART RATE: 74 BPM | HEIGHT: 67 IN | OXYGEN SATURATION: 100 % | RESPIRATION RATE: 18 BRPM | DIASTOLIC BLOOD PRESSURE: 84 MMHG | TEMPERATURE: 97.1 F

## 2020-01-23 VITALS
TEMPERATURE: 97.2 F | HEART RATE: 83 BPM | RESPIRATION RATE: 18 BRPM | WEIGHT: 180 LBS | BODY MASS INDEX: 28.25 KG/M2 | OXYGEN SATURATION: 100 % | HEIGHT: 67 IN | DIASTOLIC BLOOD PRESSURE: 60 MMHG | SYSTOLIC BLOOD PRESSURE: 117 MMHG

## 2020-01-23 DIAGNOSIS — E11.621 TYPE 2 DIABETES MELLITUS WITH FOOT ULCER: ICD-10-CM

## 2020-01-23 DIAGNOSIS — I77.0 ARTERIOVENOUS FISTULA, ACQUIRED: Chronic | ICD-10-CM

## 2020-01-23 PROCEDURE — G0277: CPT

## 2020-01-23 PROCEDURE — 99183 HYPERBARIC OXYGEN THERAPY: CPT

## 2020-01-23 PROCEDURE — 82962 GLUCOSE BLOOD TEST: CPT

## 2020-01-24 ENCOUNTER — APPOINTMENT (OUTPATIENT)
Dept: HYPERBARIC MEDICINE | Facility: HOSPITAL | Age: 45
End: 2020-01-24
Payer: COMMERCIAL

## 2020-01-24 ENCOUNTER — OUTPATIENT (OUTPATIENT)
Dept: OUTPATIENT SERVICES | Facility: HOSPITAL | Age: 45
LOS: 1 days | Discharge: HOME | End: 2020-01-24
Payer: COMMERCIAL

## 2020-01-24 VITALS
HEART RATE: 81 BPM | DIASTOLIC BLOOD PRESSURE: 83 MMHG | OXYGEN SATURATION: 100 % | TEMPERATURE: 97 F | RESPIRATION RATE: 16 BRPM | SYSTOLIC BLOOD PRESSURE: 167 MMHG

## 2020-01-24 VITALS
TEMPERATURE: 98 F | DIASTOLIC BLOOD PRESSURE: 73 MMHG | OXYGEN SATURATION: 100 % | SYSTOLIC BLOOD PRESSURE: 144 MMHG | RESPIRATION RATE: 20 BRPM | HEART RATE: 82 BPM

## 2020-01-24 DIAGNOSIS — I77.0 ARTERIOVENOUS FISTULA, ACQUIRED: Chronic | ICD-10-CM

## 2020-01-24 DIAGNOSIS — E11.621 TYPE 2 DIABETES MELLITUS WITH FOOT ULCER: ICD-10-CM

## 2020-01-24 PROCEDURE — 82962 GLUCOSE BLOOD TEST: CPT

## 2020-01-24 PROCEDURE — G0277: CPT

## 2020-01-24 PROCEDURE — 99183 HYPERBARIC OXYGEN THERAPY: CPT

## 2020-01-24 NOTE — ADDENDUM
[FreeTextEntry1] : PT ARRIVED VIA WHEEL CHAIR FROM [facility].\par PRE TX BGL NOT WITHIN PARAMETERS FOR HBOT. RN ADVISED AND PT WAS PROVIDED 15 GMS ORAL GLUCOSE VIA 2 4 OZ JUICE. \par DRAINAGE NOTED ON DRESSING ASSESSED BY RN PRIOR TO HBOT.DRESSING TO REMAIN IN PLACE PER CHRN\par ALL VITALS WITHIN PARAMETERS FOR HBOT, PT CLEARED FOR TX.\par PT DESCENT TO PRESCRIBED TX DEPTH IN CHAMBER 1 WAS WITHOUT INCIDENT. PT RESTING AT DEPTH, CHEST RISE AND FALL OBSERVED.\par TRANSFER OF CARE TO [facility] FOR REMAINDER OF TX.\par \par TRANSFER OF CARE TO T.TRANSFER RETURNED TO [facility] DURING ASCENT.\par PT ASCENT WAS WITHOUT INCIDENT. PT TOLERATED HBOT WELL. ACE APPLIED BY NURSE PER CHRN.\par PT CONFIRMED RECEIPT OF HBOT ON 01/25/20\par \par CHT MO GUERRERO 01/24/20 10:36

## 2020-01-24 NOTE — PROCEDURE
[Outpatient] : Outpatient [Wheelchair] : wheelchair [THIS CHAMBER HAS BEEN CLEANED / DISINFECTED] : This chamber has been cleaned / disinfected according to local and hospital policy and procedure prior to this treatment. [Patient demonstrated and verbalized proper technique for using air break mask] : Patient demonstrated and verbalized proper technique for using air break mask [Patient educated on the risks of SMOKING prior to HBOT with understanding] : Patient educated on the risks of SMOKING prior to HBOT with understanding [Patient educated on the risks of CONSUMING ALCOHOL prior to HBOT with understanding] : Patient educated on the risks of CONSUMING ALCOHOL prior to HBOT with understanding [Empty all pockets] : empty all pockets [100% Cotton] : 100% cotton [Pre tx medications] : pre tx medications  [No hair oils, wigs, hairpieces, pins] : no hair oils, wigs, hairpieces, pins  [No matches, cigarettes, lighters] : no matches, cigarettes, lighters  [No jewelry] : no jewelry  [No make-up, creams] : no make-up, creams  [Hearing aid removed] : hearing aid removed [Ground bracelet on pt's wrist] : ground bracelet on pt's wrist  [Dentures removed] : dentures removed [Remove nail polish] : remove nail polish  [No reading material] : no reading material  [Contacts removed] : contacts removed  [Bra, undergarments removed] : bra, undergarments removed  [No contraindicated dressings] : no contraindicated dressings [Ground Wire - VISUAL Verification - Intact/Free of Obstruction] : Ground Wire - VISUAL Verification - Intact/Free of Obstruction  [Ground Continuity - Verified < 1 ohm w/ Wrist Strap Barb] : Ground Continuity - Verified < 1 ohm w/ Wrist Strap Barb [Number: ___] : Number: [unfilled] [Diagnosis: ___] : Diagnosis: [unfilled] [____] : Post-Dive: Time - [unfilled] [___] : Post-Dive: Value - [unfilled] mg/dL [Clear all fields] : clear all fields [] : No [FreeTextEntry3] : 90 [FreeTextEntry7] : 8:26 [FreeTextEntry5] : 8:17 [de-identified] : 108 [de-identified] : 10:05 [FreeTextEntry9] : 9:56

## 2020-01-24 NOTE — ASSESSMENT
[No change from previous assessment] : No change from previous assessment [Patient undergoing HBO treatment for __________] : Patient undergoing HBO treatment for [unfilled] [Patient prepared for dive] : Patient prepared for dive [Patient descended without problem for 9 minutes] : Patient descended without problem for 9 minutes [No dizziness or thirst] :  No dizziness or thirst [Tolerating dive well] : Tolerating dive well [No ear problems] : No ear problems [Vital signs stable] : Vital signs stable [No Chest Pain, shortness of breath] : No Chest Pain, shortness of breath [Respiratory Rate Stable] : Respiratory Rate Stable [No chest pain, shortness of breath, or ear pain] :  No chest pain, shortness of breath, or ear pain  [Vital Signs stable] : Vital Signs stable [Tolerated Ascent well] : Tolerated Ascent well [A physician was present throughout the entire HBOT] : A physician was present throughout the entire HBOT [No] : No [Continue Treatment Plan] : Continue treatment plan [0] : 0 out of 10 [Clinically Stable] : Clinically stable [FreeTextEntry2] : None

## 2020-01-25 ENCOUNTER — OUTPATIENT (OUTPATIENT)
Dept: OUTPATIENT SERVICES | Facility: HOSPITAL | Age: 45
LOS: 1 days | Discharge: HOME | End: 2020-01-25
Payer: COMMERCIAL

## 2020-01-25 ENCOUNTER — APPOINTMENT (OUTPATIENT)
Dept: HYPERBARIC MEDICINE | Facility: HOSPITAL | Age: 45
End: 2020-01-25
Payer: COMMERCIAL

## 2020-01-25 VITALS
HEART RATE: 75 BPM | OXYGEN SATURATION: 100 % | TEMPERATURE: 98.1 F | SYSTOLIC BLOOD PRESSURE: 145 MMHG | DIASTOLIC BLOOD PRESSURE: 81 MMHG | RESPIRATION RATE: 18 BRPM

## 2020-01-25 VITALS
RESPIRATION RATE: 18 BRPM | DIASTOLIC BLOOD PRESSURE: 66 MMHG | OXYGEN SATURATION: 97 % | HEART RATE: 68 BPM | TEMPERATURE: 98.1 F | SYSTOLIC BLOOD PRESSURE: 114 MMHG

## 2020-01-25 DIAGNOSIS — E10.51 TYPE 1 DIABETES MELLITUS WITH DIABETIC PERIPHERAL ANGIOPATHY WITHOUT GANGRENE: ICD-10-CM

## 2020-01-25 DIAGNOSIS — Z79.899 OTHER LONG TERM (CURRENT) DRUG THERAPY: ICD-10-CM

## 2020-01-25 DIAGNOSIS — I83.93 ASYMPTOMATIC VARICOSE VEINS OF BILATERAL LOWER EXTREMITIES: ICD-10-CM

## 2020-01-25 DIAGNOSIS — Z89.429 ACQUIRED ABSENCE OF OTHER TOE(S), UNSPECIFIED SIDE: ICD-10-CM

## 2020-01-25 DIAGNOSIS — L97.422 NON-PRESSURE CHRONIC ULCER OF LEFT HEEL AND MIDFOOT WITH FAT LAYER EXPOSED: ICD-10-CM

## 2020-01-25 DIAGNOSIS — Z79.4 LONG TERM (CURRENT) USE OF INSULIN: ICD-10-CM

## 2020-01-25 DIAGNOSIS — Z98.49 CATARACT EXTRACTION STATUS, UNSPECIFIED EYE: ICD-10-CM

## 2020-01-25 DIAGNOSIS — E10.40 TYPE 1 DIABETES MELLITUS WITH DIABETIC NEUROPATHY, UNSPECIFIED: ICD-10-CM

## 2020-01-25 DIAGNOSIS — Z99.2 DEPENDENCE ON RENAL DIALYSIS: ICD-10-CM

## 2020-01-25 DIAGNOSIS — E11.621 TYPE 2 DIABETES MELLITUS WITH FOOT ULCER: ICD-10-CM

## 2020-01-25 DIAGNOSIS — I77.0 ARTERIOVENOUS FISTULA, ACQUIRED: Chronic | ICD-10-CM

## 2020-01-25 DIAGNOSIS — N19 UNSPECIFIED KIDNEY FAILURE: ICD-10-CM

## 2020-01-25 DIAGNOSIS — E10.621 TYPE 1 DIABETES MELLITUS WITH FOOT ULCER: ICD-10-CM

## 2020-01-25 DIAGNOSIS — E10.29 TYPE 1 DIABETES MELLITUS WITH OTHER DIABETIC KIDNEY COMPLICATION: ICD-10-CM

## 2020-01-25 PROCEDURE — 82962 GLUCOSE BLOOD TEST: CPT

## 2020-01-25 PROCEDURE — 99183 HYPERBARIC OXYGEN THERAPY: CPT

## 2020-01-25 PROCEDURE — G0277: CPT

## 2020-01-26 DIAGNOSIS — E10.621 TYPE 1 DIABETES MELLITUS WITH FOOT ULCER: ICD-10-CM

## 2020-01-26 DIAGNOSIS — L97.422 NON-PRESSURE CHRONIC ULCER OF LEFT HEEL AND MIDFOOT WITH FAT LAYER EXPOSED: ICD-10-CM

## 2020-01-26 DIAGNOSIS — Z79.4 LONG TERM (CURRENT) USE OF INSULIN: ICD-10-CM

## 2020-01-27 ENCOUNTER — OUTPATIENT (OUTPATIENT)
Dept: OUTPATIENT SERVICES | Facility: HOSPITAL | Age: 45
LOS: 1 days | Discharge: HOME | End: 2020-01-27
Payer: COMMERCIAL

## 2020-01-27 ENCOUNTER — APPOINTMENT (OUTPATIENT)
Dept: HYPERBARIC MEDICINE | Facility: HOSPITAL | Age: 45
End: 2020-01-27
Payer: COMMERCIAL

## 2020-01-27 VITALS
OXYGEN SATURATION: 100 % | HEART RATE: 77 BPM | TEMPERATURE: 97.6 F | SYSTOLIC BLOOD PRESSURE: 197 MMHG | DIASTOLIC BLOOD PRESSURE: 90 MMHG | RESPIRATION RATE: 18 BRPM

## 2020-01-27 VITALS — SYSTOLIC BLOOD PRESSURE: 199 MMHG | DIASTOLIC BLOOD PRESSURE: 89 MMHG

## 2020-01-27 VITALS
RESPIRATION RATE: 20 BRPM | SYSTOLIC BLOOD PRESSURE: 157 MMHG | OXYGEN SATURATION: 100 % | TEMPERATURE: 98 F | DIASTOLIC BLOOD PRESSURE: 85 MMHG | HEART RATE: 78 BPM

## 2020-01-27 DIAGNOSIS — I77.0 ARTERIOVENOUS FISTULA, ACQUIRED: Chronic | ICD-10-CM

## 2020-01-27 DIAGNOSIS — E11.621 TYPE 2 DIABETES MELLITUS WITH FOOT ULCER: ICD-10-CM

## 2020-01-27 PROCEDURE — G0277: CPT

## 2020-01-27 PROCEDURE — 82962 GLUCOSE BLOOD TEST: CPT

## 2020-01-27 PROCEDURE — 99183 HYPERBARIC OXYGEN THERAPY: CPT

## 2020-01-27 NOTE — REVIEW OF SYSTEMS
[Joint Stiffness] : joint stiffness [Skin Wound] : skin wound [Negative] : Cardiovascular [Fever] : no fever [Chills] : no chills [Loss Of Hearing] : no hearing loss [Shortness Of Breath] : no shortness of breath [Abdominal Pain] : no abdominal pain [Anxiety] : no anxiety [Easy Bleeding] : no tendency for easy bleeding [de-identified] : DFU 3 plantar left heel , skin, subcutaneous , fat , fascia  [de-identified] : IDDM with neuropathy  [de-identified] : DEE DEE

## 2020-01-27 NOTE — PHYSICAL EXAM
[4 x 4] : 4 x 4  [Normal Breath Sounds] : Normal breath sounds [0] : left 0 [1+] : left 1+ [Varicose Veins Of Lower Extremities] : present [Ankle Swelling On The Right] : mild [Purpura] : purpura [Petechiae] : petechiae [Skin Induration] : induration [Skin Ulcer] : ulcer [Oriented to Person] : oriented to person [Alert] : alert [Oriented to Place] : oriented to place [Calm] : calm [de-identified] : comfortable  [de-identified] : WNL [de-identified] : hammer toe [de-identified] : DFU 3 plantar left heel , clean granular  [de-identified] : Diabetic neuropathy  [de-identified] : 1 unit Apligraf Application [FreeTextEntry1] : Left Posterior Heel [FreeTextEntry2] : 1.4 [FreeTextEntry3] : 1.1 [FreeTextEntry4] : 0.2 [de-identified] : Intact  [de-identified] : Post Debridement Measurements 1.5 x 1.2 x 0.3  [de-identified] : Wound Veil, Calcium Alginate, Apligraf [de-identified] : Cleansed with Normal Saline\par Kerlix [TWNoteComboBox4] : Moderate [TWNoteComboBox5] : No [de-identified] : No [de-identified] : None [de-identified] : None [de-identified] : 100% [de-identified] : No [TWNoteComboBox7] : Isabela [de-identified] : Debridement performed of all devitalized tissue to bleeding viable tissue [de-identified] : Ace wraps [de-identified] : Weekly

## 2020-01-27 NOTE — ASSESSMENT
[Verbal] : Verbal [Patient] : Patient [Good - alert, interested, motivated] : Good - alert, interested, motivated [Foot Care] : foot care [Dressing changes] : dressing changes [Verbalizes knowledge/Understanding] : Verbalizes knowledge/understanding [Skin Care] : skin care [Pressure relief] : pressure relief [How and When to Call] : how and when to call [Signs and symptoms of infection] : sign and symptoms of infection [Off-loading] : off-loading [Hyperbaric Therapy] : hyperbaric therapy [Compression Therapy] : compression therapy [Patient responsibility to plan of care] : patient responsibility to plan of care [] : Yes [Stable] : stable [Home] : Home [Wheelchair] : Wheelchair [Not Applicable - Long Term Care/Home Health Agency] : Long Term Care/Home Health Agency: Not Applicable [FreeTextEntry2] : Restore Skin Integrity\par Infection Control\par Localized wound care\par  [FreeTextEntry4] : 1 Unit of Apligraf 44 (SqCm) Lot #: [id number]  Exp:01/30/2020 pH: 6.8-7.3 Item#: [id number] Reconstituted with NS, Lot #: [id number] Exp: 05/01/2022  Applied To Left Posterior Heel 25% implanted 75% Discarded\par HBOT 36/50 \par F/U for Daily HBOT\par 1 unit apligraf ordered on 01/24/20 for pts next wc appt.

## 2020-01-27 NOTE — PROCEDURE
[Saline] : saline [Sharp curette] : sharp curette [Skin] : skin [Subcutaneous Tissue] : subcutaneous tissue [Fenestrated] : fenestrated [Apligraf] : apligraf [____ % was used] : and [unfilled] % was used [____ % was discarded] : and [unfilled] % was discarded [FreeTextEntry1] : wound veil, alginate, dry, sterile dressing [FreeTextEntry9] : 2671 [de-identified] : red, granular base [de-identified] : Mario [FreeTextEntry6] : diabetic foot ulcer grade 3 down to skin and subcutaneous tissue [FreeTextEntry7] : diabetic foot ulcer grade 3 down to skin and subcutaneous tissue [de-identified] : 1mL [de-identified] : skin and subcutaneous tissue

## 2020-01-27 NOTE — PROCEDURE
[Outpatient] : Outpatient [Wheelchair] : wheelchair [THIS CHAMBER HAS BEEN CLEANED / DISINFECTED] : This chamber has been cleaned / disinfected according to local and hospital policy and procedure prior to this treatment. [Patient demonstrated and verbalized proper technique for using air break mask] : Patient demonstrated and verbalized proper technique for using air break mask [Patient educated on the risks of CONSUMING ALCOHOL prior to HBOT with understanding] : Patient educated on the risks of CONSUMING ALCOHOL prior to HBOT with understanding [Patient educated on the risks of SMOKING prior to HBOT with understanding] : Patient educated on the risks of SMOKING prior to HBOT with understanding [100% Cotton] : 100% cotton [Empty all pockets] : empty all pockets [No hair oils, wigs, hairpieces, pins] : no hair oils, wigs, hairpieces, pins  [No make-up, creams] : no make-up, creams  [Pre tx medications] : pre tx medications  [No jewelry] : no jewelry  [Hearing aid removed] : hearing aid removed [No matches, cigarettes, lighters] : no matches, cigarettes, lighters  [Ground bracelet on pt's wrist] : ground bracelet on pt's wrist  [Dentures removed] : dentures removed [Contacts removed] : contacts removed  [Remove nail polish] : remove nail polish  [No reading material] : no reading material  [No contraindicated dressings] : no contraindicated dressings [Bra, undergarments removed] : bra, undergarments removed  [Ground Continuity - Verified < 1 ohm w/ Wrist Strap Barb] : Ground Continuity - Verified < 1 ohm w/ Wrist Strap Barb [Ground Wire - VISUAL Verification - Intact/Free of Obstruction] : Ground Wire - VISUAL Verification - Intact/Free of Obstruction  [Number: ___] : Number: [unfilled] [Diagnosis: ___] : Diagnosis: [unfilled] [____] : Post-Dive: Time - [unfilled] [___] : Post-Dive: Value - [unfilled] mg/dL [Clear all fields] : clear all fields [] : No [FreeTextEntry3] : 90 [FreeTextEntry5] : 8965 [FreeTextEntry7] : 9586 [FreeTextEntry9] : 2552 [de-identified] : 0965 [de-identified] : 108 MIN

## 2020-01-28 ENCOUNTER — APPOINTMENT (OUTPATIENT)
Dept: HYPERBARIC MEDICINE | Facility: HOSPITAL | Age: 45
End: 2020-01-28
Payer: COMMERCIAL

## 2020-01-28 ENCOUNTER — OUTPATIENT (OUTPATIENT)
Dept: OUTPATIENT SERVICES | Facility: HOSPITAL | Age: 45
LOS: 1 days | Discharge: HOME | End: 2020-01-28
Payer: COMMERCIAL

## 2020-01-28 VITALS
TEMPERATURE: 97.2 F | HEART RATE: 72 BPM | SYSTOLIC BLOOD PRESSURE: 201 MMHG | BODY MASS INDEX: 28.25 KG/M2 | HEIGHT: 67 IN | OXYGEN SATURATION: 100 % | RESPIRATION RATE: 18 BRPM | WEIGHT: 180 LBS | DIASTOLIC BLOOD PRESSURE: 93 MMHG

## 2020-01-28 VITALS
RESPIRATION RATE: 20 BRPM | TEMPERATURE: 96.9 F | HEART RATE: 77 BPM | OXYGEN SATURATION: 96 % | DIASTOLIC BLOOD PRESSURE: 72 MMHG | BODY MASS INDEX: 28.25 KG/M2 | SYSTOLIC BLOOD PRESSURE: 127 MMHG | WEIGHT: 180 LBS | HEIGHT: 67 IN

## 2020-01-28 DIAGNOSIS — E11.621 TYPE 2 DIABETES MELLITUS WITH FOOT ULCER: ICD-10-CM

## 2020-01-28 DIAGNOSIS — I77.0 ARTERIOVENOUS FISTULA, ACQUIRED: Chronic | ICD-10-CM

## 2020-01-28 PROCEDURE — 82962 GLUCOSE BLOOD TEST: CPT

## 2020-01-28 PROCEDURE — G0277: CPT

## 2020-01-28 PROCEDURE — 99183 HYPERBARIC OXYGEN THERAPY: CPT

## 2020-01-29 ENCOUNTER — OUTPATIENT (OUTPATIENT)
Dept: OUTPATIENT SERVICES | Facility: HOSPITAL | Age: 45
LOS: 1 days | Discharge: HOME | End: 2020-01-29
Payer: COMMERCIAL

## 2020-01-29 ENCOUNTER — APPOINTMENT (OUTPATIENT)
Dept: HYPERBARIC MEDICINE | Facility: HOSPITAL | Age: 45
End: 2020-01-29
Payer: COMMERCIAL

## 2020-01-29 VITALS
DIASTOLIC BLOOD PRESSURE: 71 MMHG | OXYGEN SATURATION: 100 % | WEIGHT: 180 LBS | BODY MASS INDEX: 28.25 KG/M2 | TEMPERATURE: 97.6 F | HEIGHT: 67 IN | RESPIRATION RATE: 18 BRPM | SYSTOLIC BLOOD PRESSURE: 156 MMHG | HEART RATE: 74 BPM

## 2020-01-29 DIAGNOSIS — I77.0 ARTERIOVENOUS FISTULA, ACQUIRED: Chronic | ICD-10-CM

## 2020-01-29 DIAGNOSIS — E11.621 TYPE 2 DIABETES MELLITUS WITH FOOT ULCER: ICD-10-CM

## 2020-01-29 PROCEDURE — 15275 SKIN SUB GRAFT FACE/NK/HF/G: CPT

## 2020-01-29 NOTE — ASSESSMENT
[Patient prepared for dive] : Patient prepared for dive [No change from previous assessment] : No change from previous assessment [Patient undergoing HBO treatment for __________] : Patient undergoing HBO treatment for [unfilled] [Patient descended without problem for 9 minutes] : Patient descended without problem for 9 minutes [No dizziness or thirst] :  No dizziness or thirst [No ear problems] : No ear problems [Tolerating dive well] : Tolerating dive well [Vital signs stable] : Vital signs stable [No Chest Pain, shortness of breath] : No Chest Pain, shortness of breath [Respiratory Rate Stable] : Respiratory Rate Stable [No chest pain, shortness of breath, or ear pain] :  No chest pain, shortness of breath, or ear pain  [Tolerated Ascent well] : Tolerated Ascent well [Vital Signs stable] : Vital Signs stable [A physician was present throughout the entire HBOT] : A physician was present throughout the entire HBOT [Clinically Stable] : Clinically stable [No] : No [0] : 0 out of 10 [Continue Treatment Plan] : Continue treatment plan [FreeTextEntry2] : None

## 2020-01-29 NOTE — ASSESSMENT
[Verbal] : Verbal [Patient] : Patient [Fair - mild discomfort, physical impairment, low acceptance] : Fair - mild discomfort, physical impairment, low acceptance [Verbalizes knowledge/Understanding] : Verbalizes knowledge/understanding [Dressing changes] : dressing changes [Pressure relief] : pressure relief [Foot Care] : foot care [Signs and symptoms of infection] : sign and symptoms of infection [How and When to Call] : how and when to call [Patient responsibility to plan of care] : patient responsibility to plan of care [Off-loading] : off-loading [] : Yes [Stable] : stable [Wheelchair] : Wheelchair [Home] : Home [Not Applicable - Long Term Care/Home Health Agency] : Long Term Care/Home Health Agency: Not Applicable [FreeTextEntry2] : Infection Prevention\par Edema Control\par Sharps debridement\par Apligraf\par F/U 1/30/20 [FreeTextEntry4] : Patient has completed 41/50 HBOT, +10 ordered, authorization submitted today \par 3rd application of 1 unit of 44 sq/cm Apligraf applied today.\par LOT#:[id number]  , Exp: 01/30/2020 \par Reconstituted with 0.9% NS, LOT#: [id number], Exp: 05/01/2022\par 50% applied, 50% discarded\par DPM ordered 4th application of 1 unit of 44 sq/cm Apligraf to be applied in 1 week.\par Authorization for Apligraf submitted today\par F/U 1/30/20

## 2020-01-29 NOTE — ADDENDUM
[FreeTextEntry1] : PT ARRIVED VIA WHEEL CHAIR FROM [facility]\par ALL VITALS WITHIN PARAMETERS FOR HBOT\par TRANSFER OF CARE TO [facility] PRIOR TO DESCENT\par \par PT DESCENDED TO 2.0 RIVAS @ 1.75 PSI/MIN WITHOUT INCIDENT IN CHAMBER # 1. PT'S RESTING AT TX DEPTH WITH WOUND OFFLOADED. CHEST RISE AND FALL OBSERVED CHAMBERSIDE.\par \par PT ASCENDED FROM 2.0 RIVAS @ 1.75 PSI/MIN WITHOUT INCIDENT. \par \par POST HBOT, PT'S BLOOD PRESSURE HIGH. IMMEDIATE RE-TEST PERFORMED. PT'S BLOOD PRESSURE STILL HIGH. PT RESTED AND RE-TESTED AFTER 5 MINUTES, PT'S BLOOD PRESSURE NOW WITHIN DESIRED RANGE. PT SENT HOME POST WOUND CARE.\par \par PT TOLERATED TX WELL.

## 2020-01-29 NOTE — ADDENDUM
[FreeTextEntry1] : No drainage noted on Pt's bandage. Pt's foot offloaded using wedge. \par Pt descended to 2.0 RIVAS @ 1.75 PSI/min without incident in chamber #1. \par Pt resting @ depth with chest rise and fall observed throughout tx. \par Pt ascended from depth without incident. \par Pt tolerated tx well\par Pt receive ace wrap post HBOT by RN.\par

## 2020-01-29 NOTE — PROCEDURE
[Outpatient] : Outpatient [Wheelchair] : wheelchair [THIS CHAMBER HAS BEEN CLEANED / DISINFECTED] : This chamber has been cleaned / disinfected according to local and hospital policy and procedure prior to this treatment. [Patient demonstrated and verbalized proper technique for using air break mask] : Patient demonstrated and verbalized proper technique for using air break mask [Patient educated on the risks of SMOKING prior to HBOT with understanding] : Patient educated on the risks of SMOKING prior to HBOT with understanding [Patient educated on the risks of CONSUMING ALCOHOL prior to HBOT with understanding] : Patient educated on the risks of CONSUMING ALCOHOL prior to HBOT with understanding [100% Cotton] : 100% cotton [Empty all pockets] : empty all pockets [No hair oils, wigs, hairpieces, pins] : no hair oils, wigs, hairpieces, pins  [Pre tx medications] : pre tx medications  [No make-up, creams] : no make-up, creams  [No jewelry] : no jewelry  [No matches, cigarettes, lighters] : no matches, cigarettes, lighters  [Hearing aid removed] : hearing aid removed [Dentures removed] : dentures removed [Ground bracelet on pt's wrist] : ground bracelet on pt's wrist  [Contacts removed] : contacts removed  [Remove nail polish] : remove nail polish  [No reading material] : no reading material  [Bra, undergarments removed] : bra, undergarments removed  [No contraindicated dressings] : no contraindicated dressings [Ground Wire - VISUAL Verification - Intact/Free of Obstruction] : Ground Wire - VISUAL Verification - Intact/Free of Obstruction  [Ground Continuity - Verified < 1 ohm w/ Wrist Strap Barb] : Ground Continuity - Verified < 1 ohm w/ Wrist Strap Barb [Number: ___] : Number: [unfilled] [Diagnosis: ___] : Diagnosis: [unfilled] [____] : Post-Dive: Time - [unfilled] [___] : Post-Dive: Value - [unfilled] mg/dL [Clear all fields] : clear all fields [] : No [FreeTextEntry7] : 1052 [FreeTextEntry9] : 2883 [FreeTextEntry3] : 90 [FreeTextEntry5] : 5505 [de-identified] : 4260 [de-identified] : 108 min

## 2020-01-29 NOTE — PROCEDURE
[Outpatient] : Outpatient [Wheelchair] : wheelchair [THIS CHAMBER HAS BEEN CLEANED / DISINFECTED] : This chamber has been cleaned / disinfected according to local and hospital policy and procedure prior to this treatment. [Patient demonstrated and verbalized proper technique for using air break mask] : Patient demonstrated and verbalized proper technique for using air break mask [Patient educated on the risks of SMOKING prior to HBOT with understanding] : Patient educated on the risks of SMOKING prior to HBOT with understanding [Patient educated on the risks of CONSUMING ALCOHOL prior to HBOT with understanding] : Patient educated on the risks of CONSUMING ALCOHOL prior to HBOT with understanding [100% Cotton] : 100% cotton [Empty all pockets] : empty all pockets [No hair oils, wigs, hairpieces, pins] : no hair oils, wigs, hairpieces, pins  [No jewelry] : no jewelry  [Pre tx medications] : pre tx medications  [No make-up, creams] : no make-up, creams  [Hearing aid removed] : hearing aid removed [No matches, cigarettes, lighters] : no matches, cigarettes, lighters  [Dentures removed] : dentures removed [Ground bracelet on pt's wrist] : ground bracelet on pt's wrist  [Contacts removed] : contacts removed  [Remove nail polish] : remove nail polish  [Bra, undergarments removed] : bra, undergarments removed  [No reading material] : no reading material  [Ground Wire - VISUAL Verification - Intact/Free of Obstruction] : Ground Wire - VISUAL Verification - Intact/Free of Obstruction  [No contraindicated dressings] : no contraindicated dressings [Ground Continuity - Verified < 1 ohm w/ Wrist Strap Barb] : Ground Continuity - Verified < 1 ohm w/ Wrist Strap Barb [Number: ___] : Number: [unfilled] [Diagnosis: ___] : Diagnosis: [unfilled] [____] : Post-Dive: Time - [unfilled] [___] : Post-Dive: Value - [unfilled] mg/dL [Clear all fields] : clear all fields [] : No [FreeTextEntry7] : 3890 [FreeTextEntry5] : 7091 [FreeTextEntry3] : 90 [FreeTextEntry9] : 1961 [de-identified] : 7078 [de-identified] : 108 min

## 2020-01-29 NOTE — REVIEW OF SYSTEMS
[Fever] : no fever [Loss Of Hearing] : no hearing loss [Chills] : no chills [Shortness Of Breath] : no shortness of breath [Abdominal Pain] : no abdominal pain [Joint Stiffness] : joint stiffness [Skin Wound] : skin wound [Easy Bleeding] : no tendency for easy bleeding [Negative] : Cardiovascular [Anxiety] : no anxiety [de-identified] : DFU 3 plantar left heel , skin, subcutaneous , fat , fascia  [de-identified] : DEE DEE [de-identified] : IDDM with neuropathy

## 2020-01-29 NOTE — PROCEDURE
[Outpatient] : Outpatient [Wheelchair] : wheelchair [THIS CHAMBER HAS BEEN CLEANED / DISINFECTED] : This chamber has been cleaned / disinfected according to local and hospital policy and procedure prior to this treatment. [Patient demonstrated and verbalized proper technique for using air break mask] : Patient demonstrated and verbalized proper technique for using air break mask [Patient educated on the risks of SMOKING prior to HBOT with understanding] : Patient educated on the risks of SMOKING prior to HBOT with understanding [Patient educated on the risks of CONSUMING ALCOHOL prior to HBOT with understanding] : Patient educated on the risks of CONSUMING ALCOHOL prior to HBOT with understanding [100% Cotton] : 100% cotton [Empty all pockets] : empty all pockets [No hair oils, wigs, hairpieces, pins] : no hair oils, wigs, hairpieces, pins  [Pre tx medications] : pre tx medications  [No make-up, creams] : no make-up, creams  [No jewelry] : no jewelry  [No matches, cigarettes, lighters] : no matches, cigarettes, lighters  [Hearing aid removed] : hearing aid removed [Dentures removed] : dentures removed [Ground bracelet on pt's wrist] : ground bracelet on pt's wrist  [Contacts removed] : contacts removed  [Remove nail polish] : remove nail polish  [No reading material] : no reading material  [Bra, undergarments removed] : bra, undergarments removed  [No contraindicated dressings] : no contraindicated dressings [Ground Wire - VISUAL Verification - Intact/Free of Obstruction] : Ground Wire - VISUAL Verification - Intact/Free of Obstruction  [Ground Continuity - Verified < 1 ohm w/ Wrist Strap Barb] : Ground Continuity - Verified < 1 ohm w/ Wrist Strap Barb [Number: ___] : Number: [unfilled] [Diagnosis: ___] : Diagnosis: [unfilled] [____] : Post-Dive: Time - [unfilled] [___] : Post-Dive: Value - [unfilled] mg/dL [Clear all fields] : clear all fields [] : No [FreeTextEntry3] : 90 [FreeTextEntry5] : 4998 [FreeTextEntry9] : 1675 [FreeTextEntry7] : 5298 [de-identified] : 0969 [de-identified] : 108 MIN

## 2020-01-29 NOTE — ASSESSMENT
[Patient prepared for dive] : Patient prepared for dive [Patient descended without problem for 9 minutes] : Patient descended without problem for 9 minutes [No dizziness or thirst] :  No dizziness or thirst [No ear problems] : No ear problems [Vital signs stable] : Vital signs stable [Tolerating dive well] : Tolerating dive well [No Chest Pain, shortness of breath] : No Chest Pain, shortness of breath [No chest pain, shortness of breath, or ear pain] :  No chest pain, shortness of breath, or ear pain  [Respiratory Rate Stable] : Respiratory Rate Stable [Tolerated Ascent well] : Tolerated Ascent well [Vital Signs stable] : Vital Signs stable [No] : No [A physician was present throughout the entire HBOT] : A physician was present throughout the entire HBOT [Continue Treatment Plan] : Continue treatment plan [Clinically Stable] : Clinically stable [0] : 0 out of 10

## 2020-01-29 NOTE — PROCEDURE
[Sharp curette] : sharp curette [Saline] : saline [Subcutaneous Tissue] : subcutaneous tissue [Skin] : skin [____ % was used] : and [unfilled] % was used [Apligraf] : apligraf [____ % was discarded] : and [unfilled] % was discarded [Fenestrated] : fenestrated [FreeTextEntry9] : 10:18am  [FreeTextEntry1] : wound veil, alginate, dry, sterile dressing [de-identified] : priscilla [de-identified] : granular base [FreeTextEntry6] : DFU 3 plantar left heel , skin, subcutaneous , fat , fascia  [FreeTextEntry7] : DFU 3 plantar left heel , skin, subcutaneous , fat , fascia  [de-identified] : skin, subcutaneous skin [de-identified] : 3mL

## 2020-01-29 NOTE — PHYSICAL EXAM
Medicare Wellness Visit  Plan for Preventive Care    A good way for you to stay healthy is to use preventive care.  Medicare covers many services that can help you stay healthy.* The goal of these services is to find any health problems as quickly as possible. Finding problems early can help make them easier to treat.  Your personal plan below lists the services you may need and when they are due.     Health Maintenance Summary     Topic Due On Due Status Completed On    Colorectal Cancer Screening - Colonoscopy Apr 27, 2022 Not Due Apr 27, 2017    Immunization-Zoster Aug 28, 2012 Overdue     Immunization - Pneumococcal Aug 28, 2017 Overdue     Abdominal Aortic Aneurysm (AAA) Screening  Aug 28, 2017 Overdue     Medicare Wellness Visit Aug 28, 2017 Due On     IMMUNIZATION - DTaP/Tdap/Td Aug 28, 1971 Overdue     Immunization-Influenza Sep 1, 2017 Due On     Lung Cancer Screening Aug 28, 2007 Overdue            Preventive Care for Women and Men    Heart Screenings (Cardiovascular):  · Blood tests are used to check your cholesterol, lipid and triglyceride levels. High levels can increase your risk for heart disease and stroke. High levels can be treated with medications, diet and exercise. Lowering your levels can help keep your heart and blood vessels healthy.  Your provider will order these tests if they are needed.    · An ultrasound is done to see if you have an abdominal aortic aneurysm (AAA).  This is an enlargement of one of the main blood vessels that delivers blood to the body.   In the [place], 9,000 deaths are caused by AAA.  You may not even know you have this problem and as many as 1 in 3 people will have a serious problem if it is not treated.  Early diagnosis allows for more effective treatment and cure.  If you have a family history of AAA or are a male age 65-75 who has smoked, you are at higher risk of an AAA.  Your provider can order this test, if needed.    Colorectal Screening:  · There are  many tests that are used to check for cancer of your colon and rectum. You and your provider should discuss what test is best for you and when to have it done.  Options include:  · Screening Colonoscopy: exam of the entire colon, seen through a flexible lighted tube.  · Flexible Sigmoidoscopy: exam of the last third (sigmoid portion) of the colon and rectum, seen through a flexible lighted tube.  · Cologuard DNA stool test: a sample of your stool is used to screen for cancer and unseen blood in your stool.  · Fecal Occult Blood Test: a sample of your stool is studied to find any unseen blood    Flu Shot:  · An immunization that helps to prevent influenza (the flu). You should get this every year. The best time to get the shot is in the fall.    Pneumococcal Shot:  • Vaccines are available that can help prevent pneumococcal disease, which is any type of infection caused by Streptococcus pneumoniae bacteria.   Their use can prevent some cases of pneumonia, meningitis, and sepsis. There are two types of pneumococcal vaccines:   o Conjugate vaccines (PCV-13 or Prevnar 13®) - helps protect against the 13 types of pneumococcal bacteria that are the most common causes of serious infections in children and adults.    o Polysaccharide vaccine (PPSV23 or Pdyykqxlm35®) - helps protect against 23 types of pneumococcal bacteria for patients who are recommended to get it.  These vaccines should be given at least 12 months apart.  A booster is usually not needed.     Hepatitis B Shot:  · An immunization that helps to protect people from getting Hepatitis B. Hepatitis B is a virus that spreads through contact with infected blood or body fluids. Many people with the virus do not have symptoms.  The virus can lead to serious problems, such as liver disease. Some people are at higher risk than others. Your doctor will tell you if you need this shot.     Diabetes Screening:  · A test to measure sugar (glucose) in your blood is called a  fasting blood sugar. Fasting means you cannot have food or drink for at least 8 hours before the test. This test can detect diabetes long before you may notice symptoms.    Glaucoma Screening:  · Glaucoma screening is performed by your eye doctor. The test measures the fluid pressure inside your eyes to determine if you have glaucoma.     Hepatitis C Screening:  · A blood test to see if you have the hepatitis C virus.  Hepatitis C attacks the liver and is a major cause of chronic liver disease.  Medicare will cover a single screening for all adults born between 1945 & 1965, or high risk patients (people who have injected illegal drugs or people who have had blood transfusions).  High risk patients who continue to inject illegal drugs can be screened for Hepatitis C every year.    Smoking and Tobacco-Use Cessation Counseling:  · Tobacco is the single greatest cause of disease and early death in our country today. Medication and counseling together can increase a person’s chance of quitting for good.   · Medicare covers two quitting attempts per year, with four counseling sessions per attempt (eight sessions in a 12 month period)    Preventive Screening tests for Women    Screening Mammograms and Breast Exams:  · An x-ray of your breasts to check for breast cancer before you or your doctor may be able to feel it.  If breast cancer is found early it can usually be treated with success.    Pelvic Exams and Pap Tests:  · An exam to check for cervical and vaginal cancer. A Pap test is a lab test in which cells are taken from your cervix and sent to the lab to look for signs of cervical cancer. If cancer of the cervix is found early, chances for a cure are good. Testing can generally end at age 65, or if a woman has a hysterectomy for a benign condition. Your provider may recommend more frequent testing if certain abnormal results are found.    Bone Mass Measurements:  · A painless x-ray of your bone density to see if you  are at risk for a broken bone. Bone density refers to the thickness of bones or how tightly the bone tissue is packed.    Preventive Screening tests for Men    Prostate Screening:  · PSA - Prostate Cancer blood test.  Experts do not recommend routine screening of healthy men with no signs or symptoms of prostate disease.  However, men should not ignore urinary symptoms, and should discuss their family history with their doctor.    *Medicare pays for many preventive services to keep you healthy. For some of these services, you might have to pay a deductible, coinsurance, and / or copayment.  The amounts vary depending on the type of services you need and the kind of Medicare health plan you have.               [4 x 4] : 4 x 4  [Abdominal Pad] : Abdominal Pad [Normal Breath Sounds] : Normal breath sounds [1+] : left 1+ [0] : left 0 [Varicose Veins Of Lower Extremities] : present [Petechiae] : petechiae [Ankle Swelling On The Right] : mild [Purpura] : purpura [Skin Ulcer] : ulcer [Skin Induration] : induration [Oriented to Person] : oriented to person [Alert] : alert [Calm] : calm [Oriented to Place] : oriented to place [de-identified] : comfortable  [de-identified] : WNL [de-identified] : hammer toe [de-identified] : DFU 3 plantar left heel , clean granular  [FreeTextEntry1] : Posterior Heel  [de-identified] : Diabetic neuropathy  [de-identified] : post debridement measurements: 1.9 x 1.8 x 0.3 [de-identified] : callus [FreeTextEntry4] : 0.2 [FreeTextEntry3] : 1.2 [FreeTextEntry2] : 1.4 [de-identified] : Apligraf [de-identified] : Cleansed with Normal saline\par  [de-identified] : Apligraf, Dermabond, Calcium Alginate [TWNoteComboBox4] : Small [TWNoteComboBox5] : No [TWNoteComboBox6] : Other [TWNoteComboBox1] : Left [de-identified] : None [de-identified] : No [de-identified] : other [de-identified] : 100% [de-identified] : None [de-identified] : No [TWNoteComboBox7] : Isabela [de-identified] : Ace wraps [de-identified] : Application of skin substitute [de-identified] : Primary Dressing

## 2020-01-29 NOTE — ADDENDUM
[FreeTextEntry1] : Drainage cleared to be changed post HBO tx. \par Pt's foot offloaded using wedge. \par Pt descended to 2.0 RIVAS @ 1.75 PSI/min without incident in chamber #4. \par Pt resting @ depth with chest rise and fall observed throughout tx. \par Pt ascended from 2.0 RIVAS @ 1.75 PSI/min without incident. \par Pt tolerated tx well. Pt received wound care post HBO tx.

## 2020-01-30 ENCOUNTER — OUTPATIENT (OUTPATIENT)
Dept: OUTPATIENT SERVICES | Facility: HOSPITAL | Age: 45
LOS: 1 days | Discharge: HOME | End: 2020-01-30
Payer: COMMERCIAL

## 2020-01-30 ENCOUNTER — APPOINTMENT (OUTPATIENT)
Dept: HYPERBARIC MEDICINE | Facility: HOSPITAL | Age: 45
End: 2020-01-30
Payer: COMMERCIAL

## 2020-01-30 VITALS
OXYGEN SATURATION: 99 % | DIASTOLIC BLOOD PRESSURE: 54 MMHG | TEMPERATURE: 97.1 F | HEART RATE: 78 BPM | SYSTOLIC BLOOD PRESSURE: 101 MMHG | RESPIRATION RATE: 18 BRPM

## 2020-01-30 VITALS
TEMPERATURE: 97.1 F | SYSTOLIC BLOOD PRESSURE: 135 MMHG | HEART RATE: 77 BPM | DIASTOLIC BLOOD PRESSURE: 81 MMHG | OXYGEN SATURATION: 100 % | RESPIRATION RATE: 18 BRPM

## 2020-01-30 DIAGNOSIS — I77.0 ARTERIOVENOUS FISTULA, ACQUIRED: Chronic | ICD-10-CM

## 2020-01-30 DIAGNOSIS — E11.621 TYPE 2 DIABETES MELLITUS WITH FOOT ULCER: ICD-10-CM

## 2020-01-30 PROCEDURE — 99183 HYPERBARIC OXYGEN THERAPY: CPT

## 2020-01-30 PROCEDURE — 82962 GLUCOSE BLOOD TEST: CPT

## 2020-01-30 PROCEDURE — G0277: CPT

## 2020-01-30 NOTE — ASSESSMENT
[Patient prepared for dive] : Patient prepared for dive [Patient descended without problem for 9 minutes] : Patient descended without problem for 9 minutes [Vital signs stable] : Vital signs stable [No dizziness or thirst] :  No dizziness or thirst [No ear problems] : No ear problems [No Chest Pain, shortness of breath] : No Chest Pain, shortness of breath [Tolerating dive well] : Tolerating dive well [Respiratory Rate Stable] : Respiratory Rate Stable [No chest pain, shortness of breath, or ear pain] :  No chest pain, shortness of breath, or ear pain  [Tolerated Ascent well] : Tolerated Ascent well [Vital Signs stable] : Vital Signs stable [No] : No [A physician was present throughout the entire HBOT] : A physician was present throughout the entire HBOT [Clinically Stable] : Clinically stable [Continue Treatment Plan] : Continue treatment plan [0] : 0 out of 10

## 2020-01-30 NOTE — ADDENDUM
[FreeTextEntry1] : Drainage cleared to be changed post HBO tx. \par Pt descended to 2.0 RIVAS @ 1.75 PSI/min without incident in chamber #1. \par Pt resting @ depth with chest rise and fall observed throughout tx. \par Pt ascended from 2.0 RIVAS @ 1.75 PSI/min without incident. \par Pt tolerated tx well. Pt received wound care post HBO tx.

## 2020-01-30 NOTE — PROCEDURE
[Outpatient] : Outpatient [Wheelchair] : wheelchair [THIS CHAMBER HAS BEEN CLEANED / DISINFECTED] : This chamber has been cleaned / disinfected according to local and hospital policy and procedure prior to this treatment. [Patient demonstrated and verbalized proper technique for using air break mask] : Patient demonstrated and verbalized proper technique for using air break mask [Patient educated on the risks of SMOKING prior to HBOT with understanding] : Patient educated on the risks of SMOKING prior to HBOT with understanding [Patient educated on the risks of CONSUMING ALCOHOL prior to HBOT with understanding] : Patient educated on the risks of CONSUMING ALCOHOL prior to HBOT with understanding [100% Cotton] : 100% cotton [No hair oils, wigs, hairpieces, pins] : no hair oils, wigs, hairpieces, pins  [Empty all pockets] : empty all pockets [Pre tx medications] : pre tx medications  [No make-up, creams] : no make-up, creams  [Hearing aid removed] : hearing aid removed [No jewelry] : no jewelry  [No matches, cigarettes, lighters] : no matches, cigarettes, lighters  [Ground bracelet on pt's wrist] : ground bracelet on pt's wrist  [Dentures removed] : dentures removed [Contacts removed] : contacts removed  [Remove nail polish] : remove nail polish  [No reading material] : no reading material  [Ground Wire - VISUAL Verification - Intact/Free of Obstruction] : Ground Wire - VISUAL Verification - Intact/Free of Obstruction  [Bra, undergarments removed] : bra, undergarments removed  [No contraindicated dressings] : no contraindicated dressings [Ground Continuity - Verified < 1 ohm w/ Wrist Strap Barb] : Ground Continuity - Verified < 1 ohm w/ Wrist Strap Barb [Number: ___] : Number: [unfilled] [Diagnosis: ___] : Diagnosis: [unfilled] [___] : Post-Dive: Value - [unfilled] mg/dL [____] : Post-Dive: Time - [unfilled] [Clear all fields] : clear all fields [] : No [FreeTextEntry3] : 90 [FreeTextEntry5] : 2493 [FreeTextEntry9] : 5163 [FreeTextEntry7] : 5017 [de-identified] : 108 min [de-identified] : 0925

## 2020-01-31 ENCOUNTER — APPOINTMENT (OUTPATIENT)
Dept: HYPERBARIC MEDICINE | Facility: HOSPITAL | Age: 45
End: 2020-01-31
Payer: COMMERCIAL

## 2020-01-31 ENCOUNTER — OUTPATIENT (OUTPATIENT)
Dept: OUTPATIENT SERVICES | Facility: HOSPITAL | Age: 45
LOS: 1 days | Discharge: HOME | End: 2020-01-31
Payer: COMMERCIAL

## 2020-01-31 VITALS
SYSTOLIC BLOOD PRESSURE: 187 MMHG | HEART RATE: 75 BPM | TEMPERATURE: 97.5 F | DIASTOLIC BLOOD PRESSURE: 85 MMHG | RESPIRATION RATE: 18 BRPM | OXYGEN SATURATION: 100 %

## 2020-01-31 VITALS
RESPIRATION RATE: 16 BRPM | OXYGEN SATURATION: 99 % | TEMPERATURE: 97.6 F | SYSTOLIC BLOOD PRESSURE: 141 MMHG | DIASTOLIC BLOOD PRESSURE: 77 MMHG | HEART RATE: 74 BPM

## 2020-01-31 DIAGNOSIS — E10.40 TYPE 1 DIABETES MELLITUS WITH DIABETIC NEUROPATHY, UNSPECIFIED: ICD-10-CM

## 2020-01-31 DIAGNOSIS — E10.621 TYPE 1 DIABETES MELLITUS WITH FOOT ULCER: ICD-10-CM

## 2020-01-31 DIAGNOSIS — Z98.49 CATARACT EXTRACTION STATUS, UNSPECIFIED EYE: ICD-10-CM

## 2020-01-31 DIAGNOSIS — L97.422 NON-PRESSURE CHRONIC ULCER OF LEFT HEEL AND MIDFOOT WITH FAT LAYER EXPOSED: ICD-10-CM

## 2020-01-31 DIAGNOSIS — Z89.429 ACQUIRED ABSENCE OF OTHER TOE(S), UNSPECIFIED SIDE: ICD-10-CM

## 2020-01-31 DIAGNOSIS — Z79.4 LONG TERM (CURRENT) USE OF INSULIN: ICD-10-CM

## 2020-01-31 DIAGNOSIS — E10.51 TYPE 1 DIABETES MELLITUS WITH DIABETIC PERIPHERAL ANGIOPATHY WITHOUT GANGRENE: ICD-10-CM

## 2020-01-31 DIAGNOSIS — E10.29 TYPE 1 DIABETES MELLITUS WITH OTHER DIABETIC KIDNEY COMPLICATION: ICD-10-CM

## 2020-01-31 DIAGNOSIS — Z99.2 DEPENDENCE ON RENAL DIALYSIS: ICD-10-CM

## 2020-01-31 DIAGNOSIS — N19 UNSPECIFIED KIDNEY FAILURE: ICD-10-CM

## 2020-01-31 DIAGNOSIS — Z79.899 OTHER LONG TERM (CURRENT) DRUG THERAPY: ICD-10-CM

## 2020-01-31 DIAGNOSIS — I83.93 ASYMPTOMATIC VARICOSE VEINS OF BILATERAL LOWER EXTREMITIES: ICD-10-CM

## 2020-01-31 DIAGNOSIS — I77.0 ARTERIOVENOUS FISTULA, ACQUIRED: Chronic | ICD-10-CM

## 2020-01-31 DIAGNOSIS — E11.621 TYPE 2 DIABETES MELLITUS WITH FOOT ULCER: ICD-10-CM

## 2020-01-31 PROCEDURE — G0277: CPT

## 2020-01-31 PROCEDURE — 99183 HYPERBARIC OXYGEN THERAPY: CPT

## 2020-01-31 PROCEDURE — 82962 GLUCOSE BLOOD TEST: CPT

## 2020-01-31 NOTE — ASSESSMENT
[Patient prepared for dive] : Patient prepared for dive [Patient descended without problem for 9 minutes] : Patient descended without problem for 9 minutes [No dizziness or thirst] :  No dizziness or thirst [No ear problems] : No ear problems [Vital signs stable] : Vital signs stable [Tolerating dive well] : Tolerating dive well [No Chest Pain, shortness of breath] : No Chest Pain, shortness of breath [No chest pain, shortness of breath, or ear pain] :  No chest pain, shortness of breath, or ear pain  [Respiratory Rate Stable] : Respiratory Rate Stable [Tolerated Ascent well] : Tolerated Ascent well [A physician was present throughout the entire HBOT] : A physician was present throughout the entire HBOT [Vital Signs stable] : Vital Signs stable [No] : No [Continue Treatment Plan] : Continue treatment plan [Clinically Stable] : Clinically stable [0] : 0 out of 10

## 2020-01-31 NOTE — ADDENDUM
[FreeTextEntry1] : DRAINAGE NOTED ON PT'S DRESSING PRIOR TO DESCENT. CHRN NOTIFIED. PT CLEARED TO DIVE.\par \par PT DESCENDED TO 2.0 RIVAS @ 1.75 PSI/MIN WITHOUT INCIDENT IN CHAMBER # 1. PT'S RESTING AT TX DEPTH WITH CHEST RISE AND FALL OBSERVED CHAMBERSIDE.\par \par PT TO RECEIVE DRESSING CHANGE POST HBOT.\par \par PT ASCENDED FROM 2.0 RIVAS @ 1.75 PSI/MIN WITHOUT INCIDENT.\par \par PT'S BLOOD PRESSURE HIGH POST HBOT. IMMEDIATE RE-TEST PERFORMED. PT'S BLOOD PRESSURE WITHIN DESIRED RANGE.\par \par  PT TOLERATED TX WELL.

## 2020-01-31 NOTE — PROCEDURE
[Outpatient] : Outpatient [Wheelchair] : wheelchair [THIS CHAMBER HAS BEEN CLEANED / DISINFECTED] : This chamber has been cleaned / disinfected according to local and hospital policy and procedure prior to this treatment. [Patient demonstrated and verbalized proper technique for using air break mask] : Patient demonstrated and verbalized proper technique for using air break mask [Patient educated on the risks of SMOKING prior to HBOT with understanding] : Patient educated on the risks of SMOKING prior to HBOT with understanding [Patient educated on the risks of CONSUMING ALCOHOL prior to HBOT with understanding] : Patient educated on the risks of CONSUMING ALCOHOL prior to HBOT with understanding [100% Cotton] : 100% cotton [Empty all pockets] : empty all pockets [No hair oils, wigs, hairpieces, pins] : no hair oils, wigs, hairpieces, pins  [Pre tx medications] : pre tx medications  [No make-up, creams] : no make-up, creams  [No jewelry] : no jewelry  [No matches, cigarettes, lighters] : no matches, cigarettes, lighters  [Hearing aid removed] : hearing aid removed [Dentures removed] : dentures removed [Ground bracelet on pt's wrist] : ground bracelet on pt's wrist  [Contacts removed] : contacts removed  [Remove nail polish] : remove nail polish  [No reading material] : no reading material  [Bra, undergarments removed] : bra, undergarments removed  [No contraindicated dressings] : no contraindicated dressings [Ground Wire - VISUAL Verification - Intact/Free of Obstruction] : Ground Wire - VISUAL Verification - Intact/Free of Obstruction  [Ground Continuity - Verified < 1 ohm w/ Wrist Strap Barb] : Ground Continuity - Verified < 1 ohm w/ Wrist Strap Barb [Number: ___] : Number: [unfilled] [Diagnosis: ___] : Diagnosis: [unfilled] [____] : Post-Dive: Time - [unfilled] [___] : Post-Dive: Value - [unfilled] mg/dL [Clear all fields] : clear all fields [] : No [FreeTextEntry3] : 90 [FreeTextEntry5] : 2263 [FreeTextEntry7] : 8663 [FreeTextEntry9] : 9170 [de-identified] : 1009 [de-identified] : 108 MIN

## 2020-02-01 DIAGNOSIS — E10.621 TYPE 1 DIABETES MELLITUS WITH FOOT ULCER: ICD-10-CM

## 2020-02-01 DIAGNOSIS — L97.422 NON-PRESSURE CHRONIC ULCER OF LEFT HEEL AND MIDFOOT WITH FAT LAYER EXPOSED: ICD-10-CM

## 2020-02-01 DIAGNOSIS — Z79.4 LONG TERM (CURRENT) USE OF INSULIN: ICD-10-CM

## 2020-02-02 DIAGNOSIS — L97.422 NON-PRESSURE CHRONIC ULCER OF LEFT HEEL AND MIDFOOT WITH FAT LAYER EXPOSED: ICD-10-CM

## 2020-02-02 DIAGNOSIS — E10.621 TYPE 1 DIABETES MELLITUS WITH FOOT ULCER: ICD-10-CM

## 2020-02-02 DIAGNOSIS — Z79.4 LONG TERM (CURRENT) USE OF INSULIN: ICD-10-CM

## 2020-02-03 ENCOUNTER — OUTPATIENT (OUTPATIENT)
Dept: OUTPATIENT SERVICES | Facility: HOSPITAL | Age: 45
LOS: 1 days | Discharge: HOME | End: 2020-02-03
Payer: COMMERCIAL

## 2020-02-03 ENCOUNTER — APPOINTMENT (OUTPATIENT)
Dept: HYPERBARIC MEDICINE | Facility: HOSPITAL | Age: 45
End: 2020-02-03
Payer: COMMERCIAL

## 2020-02-03 VITALS
HEART RATE: 80 BPM | SYSTOLIC BLOOD PRESSURE: 138 MMHG | OXYGEN SATURATION: 100 % | TEMPERATURE: 96.9 F | DIASTOLIC BLOOD PRESSURE: 75 MMHG | RESPIRATION RATE: 20 BRPM

## 2020-02-03 VITALS
TEMPERATURE: 97 F | OXYGEN SATURATION: 100 % | HEART RATE: 75 BPM | SYSTOLIC BLOOD PRESSURE: 165 MMHG | RESPIRATION RATE: 20 BRPM | DIASTOLIC BLOOD PRESSURE: 81 MMHG

## 2020-02-03 DIAGNOSIS — E10.621 TYPE 1 DIABETES MELLITUS WITH FOOT ULCER: ICD-10-CM

## 2020-02-03 DIAGNOSIS — Z79.4 LONG TERM (CURRENT) USE OF INSULIN: ICD-10-CM

## 2020-02-03 DIAGNOSIS — I77.0 ARTERIOVENOUS FISTULA, ACQUIRED: Chronic | ICD-10-CM

## 2020-02-03 DIAGNOSIS — S91.309D UNSPECIFIED OPEN WOUND, UNSPECIFIED FOOT, SUBSEQUENT ENCOUNTER: ICD-10-CM

## 2020-02-03 DIAGNOSIS — L97.422 NON-PRESSURE CHRONIC ULCER OF LEFT HEEL AND MIDFOOT WITH FAT LAYER EXPOSED: ICD-10-CM

## 2020-02-03 PROCEDURE — 82962 GLUCOSE BLOOD TEST: CPT

## 2020-02-03 PROCEDURE — 99183 HYPERBARIC OXYGEN THERAPY: CPT

## 2020-02-03 PROCEDURE — 15275 SKIN SUB GRAFT FACE/NK/HF/G: CPT

## 2020-02-03 PROCEDURE — G0277: CPT

## 2020-02-03 NOTE — PROCEDURE
[Outpatient] : Outpatient [Wheelchair] : wheelchair [THIS CHAMBER HAS BEEN CLEANED / DISINFECTED] : This chamber has been cleaned / disinfected according to local and hospital policy and procedure prior to this treatment. [Patient educated on the risks of SMOKING prior to HBOT with understanding] : Patient educated on the risks of SMOKING prior to HBOT with understanding [Patient demonstrated and verbalized proper technique for using air break mask] : Patient demonstrated and verbalized proper technique for using air break mask [Patient educated on the risks of CONSUMING ALCOHOL prior to HBOT with understanding] : Patient educated on the risks of CONSUMING ALCOHOL prior to HBOT with understanding [100% Cotton] : 100% cotton [No hair oils, wigs, hairpieces, pins] : no hair oils, wigs, hairpieces, pins  [Empty all pockets] : empty all pockets [No make-up, creams] : no make-up, creams  [Pre tx medications] : pre tx medications  [No jewelry] : no jewelry  [Hearing aid removed] : hearing aid removed [No matches, cigarettes, lighters] : no matches, cigarettes, lighters  [Ground bracelet on pt's wrist] : ground bracelet on pt's wrist  [Dentures removed] : dentures removed [Contacts removed] : contacts removed  [Remove nail polish] : remove nail polish  [No reading material] : no reading material  [Bra, undergarments removed] : bra, undergarments removed  [Ground Wire - VISUAL Verification - Intact/Free of Obstruction] : Ground Wire - VISUAL Verification - Intact/Free of Obstruction  [No contraindicated dressings] : no contraindicated dressings [Ground Continuity - Verified < 1 ohm w/ Wrist Strap Barb] : Ground Continuity - Verified < 1 ohm w/ Wrist Strap Barb [Number: ___] : Number: [unfilled] [Diagnosis: ___] : Diagnosis: [unfilled] [____] : Post-Dive: Time - [unfilled] [___] : Post-Dive: Value - [unfilled] mg/dL [Clear all fields] : clear all fields [] : No [FreeTextEntry3] : 90 [FreeTextEntry5] : 8:11 [FreeTextEntry7] : 8:20 [FreeTextEntry9] : 9:50 [de-identified] : 9:59 [de-identified] : 108

## 2020-02-03 NOTE — ADDENDUM
[FreeTextEntry1] : PT ARRIVED VIA WHEEL CHAIR FROM [facility]\par ALL VITALS WITHIN PARAMETERS FOR HBOT. DRAINAGE NOTED ON DRESSING PRIOR TO DESCENT.\par PT CLEARED FOR TX BY CHRN. WOUND CARE TO FOLLOW HBOT.\par PT DESCENT TO 2.0 RIVAS@ 1.75 PSI/MIN IN CHAMBER 1 WAS WITHOUT INCIDENT. PT RESTING AT DEPTH CHEST RISE AND FALL OBSERVED.\par CARE TRANSFERRED TO [facility] AT TX DEPTH.\par TRANSFER OF CARE TO T DURING ASCENT\par PT ASCENT WAS WITHOUT INCIDENT. PT TOLERATED HBOT WELL\par WOUND CARE PROVIDED PRIOR TO PT LEAVING UNIT.\par \par T MO GUERRERO 10:21 02/03/20

## 2020-02-04 ENCOUNTER — OUTPATIENT (OUTPATIENT)
Dept: OUTPATIENT SERVICES | Facility: HOSPITAL | Age: 45
LOS: 1 days | Discharge: HOME | End: 2020-02-04
Payer: COMMERCIAL

## 2020-02-04 ENCOUNTER — APPOINTMENT (OUTPATIENT)
Dept: HYPERBARIC MEDICINE | Facility: HOSPITAL | Age: 45
End: 2020-02-04
Payer: COMMERCIAL

## 2020-02-04 VITALS
OXYGEN SATURATION: 100 % | HEART RATE: 74 BPM | SYSTOLIC BLOOD PRESSURE: 144 MMHG | RESPIRATION RATE: 18 BRPM | DIASTOLIC BLOOD PRESSURE: 84 MMHG | TEMPERATURE: 97.5 F

## 2020-02-04 VITALS
TEMPERATURE: 97 F | RESPIRATION RATE: 18 BRPM | SYSTOLIC BLOOD PRESSURE: 104 MMHG | OXYGEN SATURATION: 100 % | DIASTOLIC BLOOD PRESSURE: 62 MMHG | HEART RATE: 73 BPM

## 2020-02-04 DIAGNOSIS — E11.621 TYPE 2 DIABETES MELLITUS WITH FOOT ULCER: ICD-10-CM

## 2020-02-04 DIAGNOSIS — E10.621 TYPE 1 DIABETES MELLITUS WITH FOOT ULCER: ICD-10-CM

## 2020-02-04 DIAGNOSIS — Z79.4 LONG TERM (CURRENT) USE OF INSULIN: ICD-10-CM

## 2020-02-04 DIAGNOSIS — L97.422 NON-PRESSURE CHRONIC ULCER OF LEFT HEEL AND MIDFOOT WITH FAT LAYER EXPOSED: ICD-10-CM

## 2020-02-04 DIAGNOSIS — I77.0 ARTERIOVENOUS FISTULA, ACQUIRED: Chronic | ICD-10-CM

## 2020-02-04 PROCEDURE — 82962 GLUCOSE BLOOD TEST: CPT

## 2020-02-04 PROCEDURE — 99183 HYPERBARIC OXYGEN THERAPY: CPT

## 2020-02-04 PROCEDURE — G0277: CPT

## 2020-02-04 NOTE — ADDENDUM
[FreeTextEntry1] : No drainage noted on Pt's bandage. \par Pt descended to 2.0 RIVAS @ 1.75 PSI/min without incident in chamber #1. \par Pt resting @ depth with chest rise and fall observed throughout tx. \par PT ASCENDED FROM 2.0 RIVAS @ 1.75 PSI/MIN WITHOUT INCIDENT. PT TOLERATED TX WELL.\par \par PT INFORMED OF ASSESSMENT TOMORROW (2/5/20) WITH APPROPRIATE ARRIVAL TIME.

## 2020-02-04 NOTE — PROCEDURE
[Outpatient] : Outpatient [Wheelchair] : wheelchair [THIS CHAMBER HAS BEEN CLEANED / DISINFECTED] : This chamber has been cleaned / disinfected according to local and hospital policy and procedure prior to this treatment. [Patient demonstrated and verbalized proper technique for using air break mask] : Patient demonstrated and verbalized proper technique for using air break mask [Patient educated on the risks of SMOKING prior to HBOT with understanding] : Patient educated on the risks of SMOKING prior to HBOT with understanding [Patient educated on the risks of CONSUMING ALCOHOL prior to HBOT with understanding] : Patient educated on the risks of CONSUMING ALCOHOL prior to HBOT with understanding [100% Cotton] : 100% cotton [Empty all pockets] : empty all pockets [No hair oils, wigs, hairpieces, pins] : no hair oils, wigs, hairpieces, pins  [Pre tx medications] : pre tx medications  [No make-up, creams] : no make-up, creams  [No jewelry] : no jewelry  [No matches, cigarettes, lighters] : no matches, cigarettes, lighters  [Hearing aid removed] : hearing aid removed [Dentures removed] : dentures removed [Ground bracelet on pt's wrist] : ground bracelet on pt's wrist  [Contacts removed] : contacts removed  [Remove nail polish] : remove nail polish  [No reading material] : no reading material  [No contraindicated dressings] : no contraindicated dressings [Bra, undergarments removed] : bra, undergarments removed  [Ground Continuity - Verified < 1 ohm w/ Wrist Strap Barb] : Ground Continuity - Verified < 1 ohm w/ Wrist Strap Barb [Ground Wire - VISUAL Verification - Intact/Free of Obstruction] : Ground Wire - VISUAL Verification - Intact/Free of Obstruction  [Diagnosis: ___] : Diagnosis: [unfilled] [Number: ___] : Number: [unfilled] [____] : Post-Dive: Time - [unfilled] [___] : Post-Dive: Value - [unfilled] mg/dL [Clear all fields] : clear all fields [] : No [FreeTextEntry7] : 3749 [FreeTextEntry5] : 6342 [FreeTextEntry3] : 90 [FreeTextEntry9] : 4918 [de-identified] : 108 MIN [de-identified] : 0939

## 2020-02-04 NOTE — ASSESSMENT
[Patient undergoing HBO treatment for __________] : Patient undergoing HBO treatment for [unfilled] [Patient descended without problem for 9 minutes] : Patient descended without problem for 9 minutes [No dizziness or thirst] :  No dizziness or thirst [No ear problems] : No ear problems [Tolerating dive well] : Tolerating dive well [Vital signs stable] : Vital signs stable [Respiratory Rate Stable] : Respiratory Rate Stable [No Chest Pain, shortness of breath] : No Chest Pain, shortness of breath [No chest pain, shortness of breath, or ear pain] :  No chest pain, shortness of breath, or ear pain  [Vital Signs stable] : Vital Signs stable [Tolerated Ascent well] : Tolerated Ascent well [No] : No [A physician was present throughout the entire HBOT] : A physician was present throughout the entire HBOT [Clinically Stable] : Clinically stable [Continue Treatment Plan] : Continue treatment plan [0] : 0 out of 10

## 2020-02-05 ENCOUNTER — OUTPATIENT (OUTPATIENT)
Dept: OUTPATIENT SERVICES | Facility: HOSPITAL | Age: 45
LOS: 1 days | Discharge: HOME | End: 2020-02-05
Payer: COMMERCIAL

## 2020-02-05 ENCOUNTER — APPOINTMENT (OUTPATIENT)
Dept: HYPERBARIC MEDICINE | Facility: HOSPITAL | Age: 45
End: 2020-02-05
Payer: COMMERCIAL

## 2020-02-05 VITALS
RESPIRATION RATE: 18 BRPM | SYSTOLIC BLOOD PRESSURE: 130 MMHG | TEMPERATURE: 97 F | BODY MASS INDEX: 27.62 KG/M2 | HEART RATE: 83 BPM | WEIGHT: 176 LBS | OXYGEN SATURATION: 100 % | DIASTOLIC BLOOD PRESSURE: 75 MMHG | HEIGHT: 67 IN

## 2020-02-05 VITALS
RESPIRATION RATE: 18 BRPM | HEART RATE: 83 BPM | SYSTOLIC BLOOD PRESSURE: 130 MMHG | OXYGEN SATURATION: 100 % | BODY MASS INDEX: 27.62 KG/M2 | DIASTOLIC BLOOD PRESSURE: 75 MMHG | TEMPERATURE: 97 F | WEIGHT: 176 LBS | HEIGHT: 67 IN

## 2020-02-05 VITALS
HEIGHT: 67 IN | SYSTOLIC BLOOD PRESSURE: 130 MMHG | BODY MASS INDEX: 27.62 KG/M2 | OXYGEN SATURATION: 100 % | TEMPERATURE: 97 F | HEART RATE: 83 BPM | WEIGHT: 176 LBS | DIASTOLIC BLOOD PRESSURE: 75 MMHG | RESPIRATION RATE: 18 BRPM

## 2020-02-05 DIAGNOSIS — L97.422 NON-PRESSURE CHRONIC ULCER OF LEFT HEEL AND MIDFOOT WITH FAT LAYER EXPOSED: ICD-10-CM

## 2020-02-05 DIAGNOSIS — E11.621 TYPE 2 DIABETES MELLITUS WITH FOOT ULCER: ICD-10-CM

## 2020-02-05 DIAGNOSIS — I77.0 ARTERIOVENOUS FISTULA, ACQUIRED: Chronic | ICD-10-CM

## 2020-02-05 DIAGNOSIS — Z79.4 LONG TERM (CURRENT) USE OF INSULIN: ICD-10-CM

## 2020-02-05 DIAGNOSIS — E10.621 TYPE 1 DIABETES MELLITUS WITH FOOT ULCER: ICD-10-CM

## 2020-02-05 LAB
ALBUMIN SERPL ELPH-MCNC: 3.3 G/DL — SIGNIFICANT CHANGE UP (ref 3.3–5)
ALP SERPL-CCNC: 109 U/L — SIGNIFICANT CHANGE UP (ref 40–120)
ALT FLD-CCNC: 18 U/L — SIGNIFICANT CHANGE UP (ref 12–78)
ANION GAP SERPL CALC-SCNC: 11 MMOL/L — SIGNIFICANT CHANGE UP (ref 5–17)
AST SERPL-CCNC: 12 U/L — LOW (ref 15–37)
BASOPHILS # BLD AUTO: 0.08 K/UL — SIGNIFICANT CHANGE UP (ref 0–0.2)
BASOPHILS NFR BLD AUTO: 0.9 % — SIGNIFICANT CHANGE UP (ref 0–2)
BILIRUB SERPL-MCNC: 0.5 MG/DL — SIGNIFICANT CHANGE UP (ref 0.2–1.2)
BUN SERPL-MCNC: 42 MG/DL — HIGH (ref 7–23)
CALCIUM SERPL-MCNC: 8.6 MG/DL — SIGNIFICANT CHANGE UP (ref 8.5–10.1)
CHLORIDE SERPL-SCNC: 101 MMOL/L — SIGNIFICANT CHANGE UP (ref 96–108)
CO2 SERPL-SCNC: 26 MMOL/L — SIGNIFICANT CHANGE UP (ref 22–31)
CREAT SERPL-MCNC: 8.8 MG/DL — HIGH (ref 0.5–1.3)
CRP SERPL-MCNC: 0.99 MG/DL — HIGH (ref 0–0.4)
EOSINOPHIL # BLD AUTO: 0.28 K/UL — SIGNIFICANT CHANGE UP (ref 0–0.5)
EOSINOPHIL NFR BLD AUTO: 3.1 % — SIGNIFICANT CHANGE UP (ref 0–6)
ERYTHROCYTE [SEDIMENTATION RATE] IN BLOOD: 59 MM/HR — HIGH (ref 0–15)
GLUCOSE SERPL-MCNC: 156 MG/DL — HIGH (ref 70–99)
HBA1C BLD-MCNC: 7.4 % — HIGH (ref 4–5.6)
HCT VFR BLD CALC: 36.7 % — LOW (ref 39–50)
HGB BLD-MCNC: 12 G/DL — LOW (ref 13–17)
IMM GRANULOCYTES NFR BLD AUTO: 0.7 % — SIGNIFICANT CHANGE UP (ref 0–1.5)
LYMPHOCYTES # BLD AUTO: 3.5 K/UL — HIGH (ref 1–3.3)
LYMPHOCYTES # BLD AUTO: 39 % — SIGNIFICANT CHANGE UP (ref 13–44)
MCHC RBC-ENTMCNC: 30.8 PG — SIGNIFICANT CHANGE UP (ref 27–34)
MCHC RBC-ENTMCNC: 32.7 GM/DL — SIGNIFICANT CHANGE UP (ref 32–36)
MCV RBC AUTO: 94.1 FL — SIGNIFICANT CHANGE UP (ref 80–100)
MONOCYTES # BLD AUTO: 0.83 K/UL — SIGNIFICANT CHANGE UP (ref 0–0.9)
MONOCYTES NFR BLD AUTO: 9.3 % — SIGNIFICANT CHANGE UP (ref 2–14)
NEUTROPHILS # BLD AUTO: 4.22 K/UL — SIGNIFICANT CHANGE UP (ref 1.8–7.4)
NEUTROPHILS NFR BLD AUTO: 47 % — SIGNIFICANT CHANGE UP (ref 43–77)
NRBC # BLD: 0 /100 WBCS — SIGNIFICANT CHANGE UP (ref 0–0)
PLATELET # BLD AUTO: 292 K/UL — SIGNIFICANT CHANGE UP (ref 150–400)
POTASSIUM SERPL-MCNC: 4.2 MMOL/L — SIGNIFICANT CHANGE UP (ref 3.5–5.3)
POTASSIUM SERPL-SCNC: 4.2 MMOL/L — SIGNIFICANT CHANGE UP (ref 3.5–5.3)
PREALB SERPL-MCNC: 32 MG/DL — SIGNIFICANT CHANGE UP (ref 20–40)
PROT SERPL-MCNC: 8.6 G/DL — HIGH (ref 6–8.3)
RBC # BLD: 3.9 M/UL — LOW (ref 4.2–5.8)
RBC # FLD: 13.3 % — SIGNIFICANT CHANGE UP (ref 10.3–14.5)
SODIUM SERPL-SCNC: 138 MMOL/L — SIGNIFICANT CHANGE UP (ref 135–145)
WBC # BLD: 8.97 K/UL — SIGNIFICANT CHANGE UP (ref 3.8–10.5)
WBC # FLD AUTO: 8.97 K/UL — SIGNIFICANT CHANGE UP (ref 3.8–10.5)

## 2020-02-05 PROCEDURE — 36415 COLL VENOUS BLD VENIPUNCTURE: CPT

## 2020-02-05 PROCEDURE — 15275 SKIN SUB GRAFT FACE/NK/HF/G: CPT

## 2020-02-05 PROCEDURE — 83036 HEMOGLOBIN GLYCOSYLATED A1C: CPT

## 2020-02-05 PROCEDURE — 85027 COMPLETE CBC AUTOMATED: CPT

## 2020-02-05 PROCEDURE — 85652 RBC SED RATE AUTOMATED: CPT

## 2020-02-05 PROCEDURE — 84134 ASSAY OF PREALBUMIN: CPT

## 2020-02-05 PROCEDURE — 80053 COMPREHEN METABOLIC PANEL: CPT

## 2020-02-05 PROCEDURE — 86140 C-REACTIVE PROTEIN: CPT

## 2020-02-06 ENCOUNTER — APPOINTMENT (OUTPATIENT)
Dept: HYPERBARIC MEDICINE | Facility: HOSPITAL | Age: 45
End: 2020-02-06

## 2020-02-06 ENCOUNTER — APPOINTMENT (OUTPATIENT)
Dept: PODIATRY | Facility: HOSPITAL | Age: 45
End: 2020-02-06

## 2020-02-07 ENCOUNTER — APPOINTMENT (OUTPATIENT)
Dept: HYPERBARIC MEDICINE | Facility: HOSPITAL | Age: 45
End: 2020-02-07
Payer: COMMERCIAL

## 2020-02-07 ENCOUNTER — OUTPATIENT (OUTPATIENT)
Dept: OUTPATIENT SERVICES | Facility: HOSPITAL | Age: 45
LOS: 1 days | Discharge: HOME | End: 2020-02-07
Payer: COMMERCIAL

## 2020-02-07 VITALS
DIASTOLIC BLOOD PRESSURE: 66 MMHG | HEART RATE: 78 BPM | OXYGEN SATURATION: 100 % | SYSTOLIC BLOOD PRESSURE: 128 MMHG | TEMPERATURE: 97.2 F | RESPIRATION RATE: 20 BRPM

## 2020-02-07 VITALS
TEMPERATURE: 97.7 F | OXYGEN SATURATION: 100 % | SYSTOLIC BLOOD PRESSURE: 156 MMHG | HEART RATE: 71 BPM | RESPIRATION RATE: 18 BRPM | DIASTOLIC BLOOD PRESSURE: 78 MMHG

## 2020-02-07 DIAGNOSIS — I77.0 ARTERIOVENOUS FISTULA, ACQUIRED: Chronic | ICD-10-CM

## 2020-02-07 DIAGNOSIS — E11.621 TYPE 2 DIABETES MELLITUS WITH FOOT ULCER: ICD-10-CM

## 2020-02-07 DIAGNOSIS — E10.40 TYPE 1 DIABETES MELLITUS WITH DIABETIC NEUROPATHY, UNSPECIFIED: ICD-10-CM

## 2020-02-07 DIAGNOSIS — E10.621 TYPE 1 DIABETES MELLITUS WITH FOOT ULCER: ICD-10-CM

## 2020-02-07 DIAGNOSIS — Z79.899 OTHER LONG TERM (CURRENT) DRUG THERAPY: ICD-10-CM

## 2020-02-07 DIAGNOSIS — Z99.2 DEPENDENCE ON RENAL DIALYSIS: ICD-10-CM

## 2020-02-07 DIAGNOSIS — I83.93 ASYMPTOMATIC VARICOSE VEINS OF BILATERAL LOWER EXTREMITIES: ICD-10-CM

## 2020-02-07 DIAGNOSIS — N19 UNSPECIFIED KIDNEY FAILURE: ICD-10-CM

## 2020-02-07 DIAGNOSIS — E10.29 TYPE 1 DIABETES MELLITUS WITH OTHER DIABETIC KIDNEY COMPLICATION: ICD-10-CM

## 2020-02-07 DIAGNOSIS — Z79.4 LONG TERM (CURRENT) USE OF INSULIN: ICD-10-CM

## 2020-02-07 DIAGNOSIS — Z89.422 ACQUIRED ABSENCE OF OTHER LEFT TOE(S): ICD-10-CM

## 2020-02-07 DIAGNOSIS — L97.422 NON-PRESSURE CHRONIC ULCER OF LEFT HEEL AND MIDFOOT WITH FAT LAYER EXPOSED: ICD-10-CM

## 2020-02-07 DIAGNOSIS — Z98.49 CATARACT EXTRACTION STATUS, UNSPECIFIED EYE: ICD-10-CM

## 2020-02-07 DIAGNOSIS — E10.51 TYPE 1 DIABETES MELLITUS WITH DIABETIC PERIPHERAL ANGIOPATHY WITHOUT GANGRENE: ICD-10-CM

## 2020-02-07 PROCEDURE — G0277: CPT

## 2020-02-07 PROCEDURE — 99183 HYPERBARIC OXYGEN THERAPY: CPT

## 2020-02-07 PROCEDURE — 82962 GLUCOSE BLOOD TEST: CPT

## 2020-02-07 NOTE — ASSESSMENT
[Patient prepared for dive] : Patient prepared for dive [Patient descended without problem for 9 minutes] : Patient descended without problem for 9 minutes [No dizziness or thirst] :  No dizziness or thirst [No ear problems] : No ear problems [No Chest Pain, shortness of breath] : No Chest Pain, shortness of breath [Tolerating dive well] : Tolerating dive well [Vital signs stable] : Vital signs stable [Respiratory Rate Stable] : Respiratory Rate Stable [No chest pain, shortness of breath, or ear pain] :  No chest pain, shortness of breath, or ear pain  [Tolerated Ascent well] : Tolerated Ascent well [Vital Signs stable] : Vital Signs stable [No] : No [A physician was present throughout the entire HBOT] : A physician was present throughout the entire HBOT [Continue Treatment Plan] : Continue treatment plan [Clinically Stable] : Clinically stable [0] : 0 out of 10

## 2020-02-08 DIAGNOSIS — Z79.4 LONG TERM (CURRENT) USE OF INSULIN: ICD-10-CM

## 2020-02-08 DIAGNOSIS — E10.621 TYPE 1 DIABETES MELLITUS WITH FOOT ULCER: ICD-10-CM

## 2020-02-08 DIAGNOSIS — L97.422 NON-PRESSURE CHRONIC ULCER OF LEFT HEEL AND MIDFOOT WITH FAT LAYER EXPOSED: ICD-10-CM

## 2020-02-10 ENCOUNTER — APPOINTMENT (OUTPATIENT)
Dept: HYPERBARIC MEDICINE | Facility: HOSPITAL | Age: 45
End: 2020-02-10
Payer: COMMERCIAL

## 2020-02-10 ENCOUNTER — OUTPATIENT (OUTPATIENT)
Dept: OUTPATIENT SERVICES | Facility: HOSPITAL | Age: 45
LOS: 1 days | Discharge: HOME | End: 2020-02-10
Payer: COMMERCIAL

## 2020-02-10 VITALS
RESPIRATION RATE: 20 BRPM | DIASTOLIC BLOOD PRESSURE: 89 MMHG | TEMPERATURE: 97.9 F | SYSTOLIC BLOOD PRESSURE: 187 MMHG | OXYGEN SATURATION: 100 % | HEART RATE: 73 BPM

## 2020-02-10 VITALS
RESPIRATION RATE: 20 BRPM | DIASTOLIC BLOOD PRESSURE: 79 MMHG | HEART RATE: 73 BPM | SYSTOLIC BLOOD PRESSURE: 187 MMHG | OXYGEN SATURATION: 100 % | TEMPERATURE: 97.9 F

## 2020-02-10 VITALS — TEMPERATURE: 97.2 F | HEART RATE: 72 BPM | RESPIRATION RATE: 20 BRPM | OXYGEN SATURATION: 100 %

## 2020-02-10 DIAGNOSIS — E10.621 TYPE 1 DIABETES MELLITUS WITH FOOT ULCER: ICD-10-CM

## 2020-02-10 DIAGNOSIS — L97.422 NON-PRESSURE CHRONIC ULCER OF LEFT HEEL AND MIDFOOT WITH FAT LAYER EXPOSED: ICD-10-CM

## 2020-02-10 DIAGNOSIS — E11.621 TYPE 2 DIABETES MELLITUS WITH FOOT ULCER: ICD-10-CM

## 2020-02-10 DIAGNOSIS — I77.0 ARTERIOVENOUS FISTULA, ACQUIRED: Chronic | ICD-10-CM

## 2020-02-10 DIAGNOSIS — Z79.4 LONG TERM (CURRENT) USE OF INSULIN: ICD-10-CM

## 2020-02-10 PROCEDURE — G0277: CPT

## 2020-02-10 PROCEDURE — 82962 GLUCOSE BLOOD TEST: CPT

## 2020-02-10 PROCEDURE — 99183 HYPERBARIC OXYGEN THERAPY: CPT

## 2020-02-10 NOTE — ASSESSMENT
[Patient] : Patient [Verbal] : Verbal [Good - alert, interested, motivated] : Good - alert, interested, motivated [Verbalizes knowledge/Understanding] : Verbalizes knowledge/understanding [Dressing changes] : dressing changes [Skin Care] : skin care [Foot Care] : foot care [Signs and symptoms of infection] : sign and symptoms of infection [Labs and Tests] : labs and tests [Hyperbaric Therapy] : hyperbaric therapy [How and When to Call] : how and when to call [Other: ____] : [unfilled] [Patient responsibility to plan of care] : patient responsibility to plan of care [Off-loading] : off-loading [] : Yes [Stable] : stable [Home] : Home [Not Applicable - Long Term Care/Home Health Agency] : Long Term Care/Home Health Agency: Not Applicable [Wheelchair] : Wheelchair [FreeTextEntry2] : Promote optimal skin integrity, offloading, infection prevention [FreeTextEntry4] : Dr. Martin\par \par Patient completed 45/60 HBO txs to date for DG3U\par 1 unit of Apligraf (44 sq cm), Lot #[id number] , Exp. 2/6/2020, pH:  7.3 - 7.5, reconstituted with NS, Lot # [id number], Exp. 5/1/2022 , applied to left posterior heel.   25% implanted.   75% discarded.\par 1 UNIT APLIGRAF ORDERED ON 02/07/20 FOR PTS NEXT WC APPT.\par 1 unit of Apligraf (44 sq cm) ordered for next week.  Submitted for authorization.\par \par F/U tomorrow for HBO tx\par \par \par Blood work done today\par

## 2020-02-10 NOTE — PROCEDURE
[Sharp curette] : sharp curette [Saline] : saline [Skin] : skin [Fenestrated] : fenestrated [Apligraf] : apligraf [Subcutaneous Tissue] : subcutaneous tissue [____ % was used] : and [unfilled] % was used [____ % was discarded] : and [unfilled] % was discarded [FreeTextEntry9] : 1007 [FreeTextEntry1] : wound veil, alginate, dry, sterile dressing [FreeTextEntry6] : DFU 3 plantar left heel , skin, subcutaneous , fat , fascia.  [de-identified] : priscilla [de-identified] : granular base [de-identified] : 1mL [FreeTextEntry7] : DFU 3 plantar left heel , skin, subcutaneous , fat , fascia.

## 2020-02-10 NOTE — PROCEDURE
[FreeTextEntry3] : 90 [] : No [FreeTextEntry5] : 8:08 [FreeTextEntry7] : 8:17 [de-identified] : 9:56 [FreeTextEntry9] : 9:47 [de-identified] : 108

## 2020-02-10 NOTE — REVIEW OF SYSTEMS
[Joint Stiffness] : joint stiffness [Skin Wound] : skin wound [Negative] : Cardiovascular [Fever] : no fever [Chills] : no chills [Abdominal Pain] : no abdominal pain [Shortness Of Breath] : no shortness of breath [Loss Of Hearing] : no hearing loss [Easy Bleeding] : no tendency for easy bleeding [Anxiety] : no anxiety [de-identified] : DFU 3 plantar left heel , skin, subcutaneous , fat , fascia  [de-identified] : DEE DEE [de-identified] : IDDM with neuropathy

## 2020-02-10 NOTE — PROCEDURE
[Outpatient] : Outpatient [Wheelchair] : wheelchair [____] : Post-Dive: Time - [unfilled] [___] : Post-Dive: Value - [unfilled] mg/dL [Patient demonstrated and verbalized proper technique for using air break mask] : Patient demonstrated and verbalized proper technique for using air break mask [Patient educated on the risks of SMOKING prior to HBOT with understanding] : Patient educated on the risks of SMOKING prior to HBOT with understanding [Patient educated on the risks of CONSUMING ALCOHOL prior to HBOT with understanding] : Patient educated on the risks of CONSUMING ALCOHOL prior to HBOT with understanding [100% Cotton] : 100% cotton [Empty all pockets] : empty all pockets [No hair oils, wigs, hairpieces, pins] : no hair oils, wigs, hairpieces, pins  [Pre tx medications] : pre tx medications  [No make-up, creams] : no make-up, creams  [No jewelry] : no jewelry  [No matches, cigarettes, lighters] : no matches, cigarettes, lighters  [Dentures removed] : dentures removed [Hearing aid removed] : hearing aid removed [Ground bracelet on pt's wrist] : ground bracelet on pt's wrist  [Contacts removed] : contacts removed  [Remove nail polish] : remove nail polish  [No reading material] : no reading material  [Bra, undergarments removed] : bra, undergarments removed  [No contraindicated dressings] : no contraindicated dressings [Ground Continuity - Verified < 1 ohm w/ Wrist Strap Barb] : Ground Continuity - Verified < 1 ohm w/ Wrist Strap Barb [Ground Wire - VISUAL Verification - Intact/Free of Obstruction] : Ground Wire - VISUAL Verification - Intact/Free of Obstruction  [Clear all fields] : clear all fields [Diagnosis: ___] : Diagnosis: [unfilled] [Number: ___] : Number: [unfilled] [THIS CHAMBER HAS BEEN CLEANED / DISINFECTED] : This chamber has been cleaned / disinfected according to local and hospital policy and procedure prior to this treatment. [] : No [FreeTextEntry3] : 90 [FreeTextEntry7] : 8:14 [FreeTextEntry5] : 8:05 [FreeTextEntry9] : 9:44 [de-identified] : 108 MIN [de-identified] : 9:53

## 2020-02-10 NOTE — ASSESSMENT

## 2020-02-10 NOTE — ADDENDUM
[FreeTextEntry1] : PT ARRIVED VIA WHEEL CHAIR FROM [facility]\par ALL VITALS WITHIN PARAMETERS \par DRAINAGE ASSESSED BY CHRN, PT CLEARED FOR HBOT.\par WOUND CARE TO FOLLOW HBOT.\par PT DESCENT TO 2.0 RIVAS@ 1.75 PSI/MIN IN CHAMBER 1 WAS WITHOUT INCIDENT.\par PT RESTING AT DEPTH WITH CHEST RISE AND FALL OBSERVED CHAMBERSIDE.\par \par PT ASCENDED FROM 2.0 RIVAS @ 1.75PSI/MIN WITHOUT  INCIDENT. PT TOLERATED TX WELL.

## 2020-02-10 NOTE — ADDENDUM
[FreeTextEntry1] : PT ARRIVED VIA WHEEL CHAIR FROM [facility]\par ALL VITALS WITHIN PARAMETERS FOR HBOT. DRAINAGE NOTED PRIOR TO DESCENT.\par PT CLEARED FOR TX BY NURSE. WOUND CARE TO FOLLOW HBOT\par PT DESCENT TO 2.0 RIVAS@ 1.75 PSI/MIN IN CHAMBER 1 WAS WITHOUT INCIDENT.\par PT RESTING AT DEPTH CHEST RISE AND FALL OBSERVED\par PT ASCENT WAS WITHOUT INCIDENT. PT TOLERATED HBOT WELL.\par DRESSING CHANGED PRIOR TO PT LEAVING UNIT\par \par CHMELANIE GUERRERO 02/10/20 10:19

## 2020-02-10 NOTE — PHYSICAL EXAM
[4 x 4] : 4 x 4  [Abdominal Pad] : Abdominal Pad [Normal Breath Sounds] : Normal breath sounds [0] : left 0 [Varicose Veins Of Lower Extremities] : bilaterally [1+] : left 1+ [Petechiae] : petechiae [Ankle Swelling On The Right] : mild [Purpura] : purpura [Skin Ulcer] : ulcer [Skin Induration] : induration [Oriented to Person] : oriented to person [Alert] : alert [Oriented to Place] : oriented to place [Calm] : calm [de-identified] : comfortable  [de-identified] : hammer toe [de-identified] : WNL [de-identified] : DFU 3 plantar left heel , clean granular  [de-identified] : Diabetic neuropathy  [de-identified] : No anesthesia used, Small amount of bleeding, patient tolerated procedure well, post debridement measurement:  1.6 x 1.3 . 0.3\par  [FreeTextEntry1] : Left posterior heel [FreeTextEntry2] : 1.5 [FreeTextEntry3] : 1.2 [FreeTextEntry4] : 0.2 [de-identified] : serosanguineous [de-identified] : callus [de-identified] : Loose to secure dressing [de-identified] : Apligraf, Wound veil, alginate [de-identified] : None [TWNoteComboBox4] : Small [de-identified] : NSC [de-identified] : No [de-identified] : 100% [de-identified] : Application of skin substitute [TWNoteComboBox7] : Isabela [de-identified] : Ace wraps

## 2020-02-11 ENCOUNTER — OUTPATIENT (OUTPATIENT)
Dept: OUTPATIENT SERVICES | Facility: HOSPITAL | Age: 45
LOS: 1 days | Discharge: HOME | End: 2020-02-11
Payer: COMMERCIAL

## 2020-02-11 ENCOUNTER — APPOINTMENT (OUTPATIENT)
Dept: HYPERBARIC MEDICINE | Facility: HOSPITAL | Age: 45
End: 2020-02-11
Payer: COMMERCIAL

## 2020-02-11 VITALS
OXYGEN SATURATION: 100 % | TEMPERATURE: 97.1 F | HEART RATE: 74 BPM | SYSTOLIC BLOOD PRESSURE: 120 MMHG | DIASTOLIC BLOOD PRESSURE: 65 MMHG | RESPIRATION RATE: 18 BRPM

## 2020-02-11 VITALS
HEART RATE: 73 BPM | OXYGEN SATURATION: 100 % | RESPIRATION RATE: 18 BRPM | DIASTOLIC BLOOD PRESSURE: 80 MMHG | SYSTOLIC BLOOD PRESSURE: 169 MMHG | TEMPERATURE: 97.9 F

## 2020-02-11 DIAGNOSIS — E11.621 TYPE 2 DIABETES MELLITUS WITH FOOT ULCER: ICD-10-CM

## 2020-02-11 DIAGNOSIS — I77.0 ARTERIOVENOUS FISTULA, ACQUIRED: Chronic | ICD-10-CM

## 2020-02-11 PROCEDURE — 82962 GLUCOSE BLOOD TEST: CPT

## 2020-02-11 PROCEDURE — 99183 HYPERBARIC OXYGEN THERAPY: CPT

## 2020-02-11 PROCEDURE — G0277: CPT

## 2020-02-11 NOTE — ASSESSMENT
[Patient descended without problem for 9 minutes] : Patient descended without problem for 9 minutes [Patient prepared for dive] : Patient prepared for dive [No ear problems] : No ear problems [No dizziness or thirst] :  No dizziness or thirst [Tolerating dive well] : Tolerating dive well [No Chest Pain, shortness of breath] : No Chest Pain, shortness of breath [Vital signs stable] : Vital signs stable [Respiratory Rate Stable] : Respiratory Rate Stable [No chest pain, shortness of breath, or ear pain] :  No chest pain, shortness of breath, or ear pain  [Vital Signs stable] : Vital Signs stable [Tolerated Ascent well] : Tolerated Ascent well [Clinically Stable] : Clinically stable [No] : No [A physician was present throughout the entire HBOT] : A physician was present throughout the entire HBOT [0] : 0 out of 10 [Continue Treatment Plan] : Continue treatment plan

## 2020-02-11 NOTE — ADDENDUM
[FreeTextEntry1] : No drainage noticed on pt's dressing prior to HBOT. \par Pt descended to depth without incident in chamber # 1. \par Pt is resting @ depth with chest rise and fall observed @ chamber side.\par Pt ascended from 2.0 RIVAS @ 1.75 PSI/min without incident. \par Pt tolerated tx well. Pt received ace wrap post HBO tx.

## 2020-02-11 NOTE — PROCEDURE
[Outpatient] : Outpatient [Wheelchair] : wheelchair [THIS CHAMBER HAS BEEN CLEANED / DISINFECTED] : This chamber has been cleaned / disinfected according to local and hospital policy and procedure prior to this treatment. [Patient demonstrated and verbalized proper technique for using air break mask] : Patient demonstrated and verbalized proper technique for using air break mask [Patient educated on the risks of SMOKING prior to HBOT with understanding] : Patient educated on the risks of SMOKING prior to HBOT with understanding [Patient educated on the risks of CONSUMING ALCOHOL prior to HBOT with understanding] : Patient educated on the risks of CONSUMING ALCOHOL prior to HBOT with understanding [100% Cotton] : 100% cotton [Empty all pockets] : empty all pockets [No hair oils, wigs, hairpieces, pins] : no hair oils, wigs, hairpieces, pins  [Pre tx medications] : pre tx medications  [No make-up, creams] : no make-up, creams  [No jewelry] : no jewelry  [No matches, cigarettes, lighters] : no matches, cigarettes, lighters  [Hearing aid removed] : hearing aid removed [Dentures removed] : dentures removed [Ground bracelet on pt's wrist] : ground bracelet on pt's wrist  [Contacts removed] : contacts removed  [Remove nail polish] : remove nail polish  [No reading material] : no reading material  [Bra, undergarments removed] : bra, undergarments removed  [No contraindicated dressings] : no contraindicated dressings [Ground Wire - VISUAL Verification - Intact/Free of Obstruction] : Ground Wire - VISUAL Verification - Intact/Free of Obstruction  [Ground Continuity - Verified < 1 ohm w/ Wrist Strap Barb] : Ground Continuity - Verified < 1 ohm w/ Wrist Strap Barb [Number: ___] : Number: [unfilled] [Diagnosis: ___] : Diagnosis: [unfilled] [____] : Post-Dive: Time - [unfilled] [___] : Post-Dive: Value - [unfilled] mg/dL [Clear all fields] : clear all fields [] : No [FreeTextEntry3] : 90 [FreeTextEntry5] : 2531 [FreeTextEntry9] : 9996 [FreeTextEntry7] : 4272 [de-identified] : 0984 [de-identified] : 108 min

## 2020-02-12 ENCOUNTER — APPOINTMENT (OUTPATIENT)
Dept: HYPERBARIC MEDICINE | Facility: HOSPITAL | Age: 45
End: 2020-02-12
Payer: COMMERCIAL

## 2020-02-12 ENCOUNTER — OUTPATIENT (OUTPATIENT)
Dept: OUTPATIENT SERVICES | Facility: HOSPITAL | Age: 45
LOS: 1 days | Discharge: HOME | End: 2020-02-12
Payer: COMMERCIAL

## 2020-02-12 VITALS
BODY MASS INDEX: 27.94 KG/M2 | OXYGEN SATURATION: 100 % | WEIGHT: 178 LBS | HEART RATE: 57 BPM | HEIGHT: 67 IN | SYSTOLIC BLOOD PRESSURE: 147 MMHG | DIASTOLIC BLOOD PRESSURE: 77 MMHG | RESPIRATION RATE: 18 BRPM | TEMPERATURE: 97.1 F

## 2020-02-12 DIAGNOSIS — E10.40 TYPE 1 DIABETES MELLITUS WITH DIABETIC NEUROPATHY, UNSPECIFIED: ICD-10-CM

## 2020-02-12 DIAGNOSIS — Z89.422 ACQUIRED ABSENCE OF OTHER LEFT TOE(S): ICD-10-CM

## 2020-02-12 DIAGNOSIS — E10.621 TYPE 1 DIABETES MELLITUS WITH FOOT ULCER: ICD-10-CM

## 2020-02-12 DIAGNOSIS — E10.29 TYPE 1 DIABETES MELLITUS WITH OTHER DIABETIC KIDNEY COMPLICATION: ICD-10-CM

## 2020-02-12 DIAGNOSIS — E11.621 TYPE 2 DIABETES MELLITUS WITH FOOT ULCER: ICD-10-CM

## 2020-02-12 DIAGNOSIS — L97.422 NON-PRESSURE CHRONIC ULCER OF LEFT HEEL AND MIDFOOT WITH FAT LAYER EXPOSED: ICD-10-CM

## 2020-02-12 DIAGNOSIS — Z98.49 CATARACT EXTRACTION STATUS, UNSPECIFIED EYE: ICD-10-CM

## 2020-02-12 DIAGNOSIS — N19 UNSPECIFIED KIDNEY FAILURE: ICD-10-CM

## 2020-02-12 DIAGNOSIS — Z79.4 LONG TERM (CURRENT) USE OF INSULIN: ICD-10-CM

## 2020-02-12 DIAGNOSIS — Z99.2 DEPENDENCE ON RENAL DIALYSIS: ICD-10-CM

## 2020-02-12 DIAGNOSIS — Z79.899 OTHER LONG TERM (CURRENT) DRUG THERAPY: ICD-10-CM

## 2020-02-12 DIAGNOSIS — I83.93 ASYMPTOMATIC VARICOSE VEINS OF BILATERAL LOWER EXTREMITIES: ICD-10-CM

## 2020-02-12 DIAGNOSIS — I77.0 ARTERIOVENOUS FISTULA, ACQUIRED: Chronic | ICD-10-CM

## 2020-02-12 PROCEDURE — 15275 SKIN SUB GRAFT FACE/NK/HF/G: CPT

## 2020-02-13 ENCOUNTER — APPOINTMENT (OUTPATIENT)
Dept: HYPERBARIC MEDICINE | Facility: HOSPITAL | Age: 45
End: 2020-02-13
Payer: COMMERCIAL

## 2020-02-13 ENCOUNTER — OUTPATIENT (OUTPATIENT)
Dept: OUTPATIENT SERVICES | Facility: HOSPITAL | Age: 45
LOS: 1 days | Discharge: HOME | End: 2020-02-13
Payer: COMMERCIAL

## 2020-02-13 VITALS
TEMPERATURE: 97.2 F | DIASTOLIC BLOOD PRESSURE: 57 MMHG | SYSTOLIC BLOOD PRESSURE: 93 MMHG | HEART RATE: 76 BPM | OXYGEN SATURATION: 95 % | RESPIRATION RATE: 18 BRPM

## 2020-02-13 VITALS
DIASTOLIC BLOOD PRESSURE: 76 MMHG | HEART RATE: 81 BPM | OXYGEN SATURATION: 100 % | RESPIRATION RATE: 18 BRPM | TEMPERATURE: 97.4 F | SYSTOLIC BLOOD PRESSURE: 118 MMHG

## 2020-02-13 DIAGNOSIS — E11.621 TYPE 2 DIABETES MELLITUS WITH FOOT ULCER: ICD-10-CM

## 2020-02-13 DIAGNOSIS — E10.621 TYPE 1 DIABETES MELLITUS WITH FOOT ULCER: ICD-10-CM

## 2020-02-13 DIAGNOSIS — Z79.4 LONG TERM (CURRENT) USE OF INSULIN: ICD-10-CM

## 2020-02-13 DIAGNOSIS — I77.0 ARTERIOVENOUS FISTULA, ACQUIRED: Chronic | ICD-10-CM

## 2020-02-13 DIAGNOSIS — L97.422 NON-PRESSURE CHRONIC ULCER OF LEFT HEEL AND MIDFOOT WITH FAT LAYER EXPOSED: ICD-10-CM

## 2020-02-13 PROCEDURE — 82962 GLUCOSE BLOOD TEST: CPT

## 2020-02-13 PROCEDURE — 99183 HYPERBARIC OXYGEN THERAPY: CPT

## 2020-02-13 PROCEDURE — G0277: CPT

## 2020-02-13 NOTE — PHYSICAL EXAM
[4 x 4] : 4 x 4  [Abdominal Pad] : Abdominal Pad [Normal Breath Sounds] : Normal breath sounds [0] : left 0 [1+] : left 1+ [Varicose Veins Of Lower Extremities] : bilaterally [Ankle Swelling On The Right] : mild [Purpura] : purpura [Petechiae] : petechiae [Skin Ulcer] : ulcer [Skin Induration] : induration [Alert] : alert [Oriented to Person] : oriented to person [Oriented to Place] : oriented to place [Calm] : calm [de-identified] : WNL [de-identified] : comfortable  [de-identified] : hammer toe [de-identified] : DFU 3 plantar left heel , clean granular  [de-identified] : Diabetic neuropathy  [de-identified] : No anesthesia used, Small amount of bleeding, patient tolerated procedure well, post debridement measurement:  2.3 x 1.1 x 0.2 cm\par  [FreeTextEntry2] : 2.2 [FreeTextEntry1] : Left posterior heel [FreeTextEntry3] : 1.0 [FreeTextEntry4] : 0.1 [de-identified] : callus [de-identified] : serosanguineous [de-identified] : loose to secure dressing [de-identified] : <5% [de-identified] : NSC [TWNoteComboBox4] : Small [de-identified] : Apligraf, wound veil, alginate [de-identified] : Yes [de-identified] : 100% [de-identified] : Application of skin substitute [TWNoteComboBox7] : Isabela [de-identified] : Weekly [de-identified] : Ace wraps

## 2020-02-13 NOTE — PROCEDURE
[Outpatient] : Outpatient [Wheelchair] : wheelchair [THIS CHAMBER HAS BEEN CLEANED / DISINFECTED] : This chamber has been cleaned / disinfected according to local and hospital policy and procedure prior to this treatment. [____] : Post-Dive: Time - [unfilled] [___] : Post-Dive: Value - [unfilled] mg/dL [Patient demonstrated and verbalized proper technique for using air break mask] : Patient demonstrated and verbalized proper technique for using air break mask [Patient educated on the risks of SMOKING prior to HBOT with understanding] : Patient educated on the risks of SMOKING prior to HBOT with understanding [Patient educated on the risks of CONSUMING ALCOHOL prior to HBOT with understanding] : Patient educated on the risks of CONSUMING ALCOHOL prior to HBOT with understanding [100% Cotton] : 100% cotton [Empty all pockets] : empty all pockets [No hair oils, wigs, hairpieces, pins] : no hair oils, wigs, hairpieces, pins  [Pre tx medications] : pre tx medications  [No jewelry] : no jewelry  [No make-up, creams] : no make-up, creams  [No matches, cigarettes, lighters] : no matches, cigarettes, lighters  [Hearing aid removed] : hearing aid removed [Dentures removed] : dentures removed [Ground bracelet on pt's wrist] : ground bracelet on pt's wrist  [Remove nail polish] : remove nail polish  [Contacts removed] : contacts removed  [No reading material] : no reading material  [No contraindicated dressings] : no contraindicated dressings [Bra, undergarments removed] : bra, undergarments removed  [Ground Continuity - Verified < 1 ohm w/ Wrist Strap Barb] : Ground Continuity - Verified < 1 ohm w/ Wrist Strap Barb [Ground Wire - VISUAL Verification - Intact/Free of Obstruction] : Ground Wire - VISUAL Verification - Intact/Free of Obstruction  [Clear all fields] : clear all fields [Number: ___] : Number: [unfilled] [Diagnosis: ___] : Diagnosis: [unfilled] [] : No [FreeTextEntry3] : 90 [FreeTextEntry5] : 2438 [FreeTextEntry7] : 0885 [FreeTextEntry9] : 1240 [de-identified] : 108 min [de-identified] : 1027

## 2020-02-13 NOTE — ASSESSMENT
[Patient prepared for dive] : Patient prepared for dive [Patient descended without problem for 9 minutes] : Patient descended without problem for 9 minutes [No dizziness or thirst] :  No dizziness or thirst [Tolerating dive well] : Tolerating dive well [No ear problems] : No ear problems [Vital signs stable] : Vital signs stable [Respiratory Rate Stable] : Respiratory Rate Stable [No chest pain, shortness of breath, or ear pain] :  No chest pain, shortness of breath, or ear pain  [No Chest Pain, shortness of breath] : No Chest Pain, shortness of breath [Vital Signs stable] : Vital Signs stable [Tolerated Ascent well] : Tolerated Ascent well [A physician was present throughout the entire HBOT] : A physician was present throughout the entire HBOT [No] : No [Clinically Stable] : Clinically stable [Continue Treatment Plan] : Continue treatment plan [0] : 0 out of 10

## 2020-02-13 NOTE — PROCEDURE
[Saline] : saline [Scalpel] : scalpel [Sharp scissors] : sharp scissors [Fenestrated] : fenestrated [Other: ___] : [unfilled] [____ % was used] : and [unfilled] % was used [____ % was discarded] : and [unfilled] % was discarded [Apligraf] : apligraf [FreeTextEntry9] : 1038 [de-identified] : DFU 3 posterior left heel  [de-identified] : sina applied  [de-identified] : Chao [FreeTextEntry7] : same [FreeTextEntry6] : DFU 3 posterior left heel  [de-identified] : 1cc [de-identified] : 0 [de-identified] : nely

## 2020-02-13 NOTE — ADDENDUM
[FreeTextEntry1] : Drainage cleared to be changed tomorrow, 2/14/19. \par Pt descended to 2.0 RIVAS @ 1.75 PSI/min without incident in chamber #1. \par Pt resting @ depth with chest rise and fall observed throughout tx. \par Pt ascended from 2.0 RIVAS @ 1.75 PSI/min without incident. \par Pt tolerated tx well. Pt received ace wrap post HBO tx. \par

## 2020-02-13 NOTE — REVIEW OF SYSTEMS
[Joint Stiffness] : joint stiffness [Skin Wound] : skin wound [Negative] : Cardiovascular [Fever] : no fever [Chills] : no chills [Loss Of Hearing] : no hearing loss [Shortness Of Breath] : no shortness of breath [Anxiety] : no anxiety [Abdominal Pain] : no abdominal pain [Easy Bleeding] : no tendency for easy bleeding [de-identified] : DEE DEE [de-identified] : IDDM with neuropathy  [de-identified] : DFU 3 plantar left heel , skin, subcutaneous , fat , fascia

## 2020-02-13 NOTE — ASSESSMENT
[Verbal] : Verbal [Verbalizes knowledge/Understanding] : Verbalizes knowledge/understanding [Patient] : Patient [Good - alert, interested, motivated] : Good - alert, interested, motivated [Foot Care] : foot care [Dressing changes] : dressing changes [Signs and symptoms of infection] : sign and symptoms of infection [Skin Care] : skin care [How and When to Call] : how and when to call [Off-loading] : off-loading [Hyperbaric Therapy] : hyperbaric therapy [Patient responsibility to plan of care] : patient responsibility to plan of care [Other: ____] : [unfilled] [] : Yes [Stable] : stable [Home] : Home [Ambulatory] : Ambulatory [Not Applicable - Long Term Care/Home Health Agency] : Long Term Care/Home Health Agency: Not Applicable [FreeTextEntry2] : Promote optimal skin integrity, offloading, infection prevention\par  [FreeTextEntry4] : Dr. Guidry\par Patient completed 48/60 HBO txs for DG3FU\par 1 unit of Apligraf (44 sq cm), Item #[id number],  Lot #[id number],  Exp. 2/18/20, pH:  7.3 - 7.5, reconstituted with NS, Lot #[id number] , Exp.5/1/22, applied to left posterior heel.  50 % implanted.  50 % discarded.\par Patient received 5 Apligrafs to date. No additional Apligrafs ordered.\par Daily HBO\par

## 2020-02-14 ENCOUNTER — APPOINTMENT (OUTPATIENT)
Dept: HYPERBARIC MEDICINE | Facility: HOSPITAL | Age: 45
End: 2020-02-14
Payer: COMMERCIAL

## 2020-02-14 ENCOUNTER — OUTPATIENT (OUTPATIENT)
Dept: OUTPATIENT SERVICES | Facility: HOSPITAL | Age: 45
LOS: 1 days | Discharge: HOME | End: 2020-02-14
Payer: COMMERCIAL

## 2020-02-14 VITALS
HEART RATE: 72 BPM | RESPIRATION RATE: 20 BRPM | TEMPERATURE: 97.8 F | OXYGEN SATURATION: 100 % | DIASTOLIC BLOOD PRESSURE: 81 MMHG | SYSTOLIC BLOOD PRESSURE: 145 MMHG

## 2020-02-14 VITALS
OXYGEN SATURATION: 100 % | TEMPERATURE: 97.8 F | HEART RATE: 76 BPM | SYSTOLIC BLOOD PRESSURE: 134 MMHG | RESPIRATION RATE: 20 BRPM | DIASTOLIC BLOOD PRESSURE: 73 MMHG

## 2020-02-14 DIAGNOSIS — Z79.4 LONG TERM (CURRENT) USE OF INSULIN: ICD-10-CM

## 2020-02-14 DIAGNOSIS — I77.0 ARTERIOVENOUS FISTULA, ACQUIRED: Chronic | ICD-10-CM

## 2020-02-14 DIAGNOSIS — E10.621 TYPE 1 DIABETES MELLITUS WITH FOOT ULCER: ICD-10-CM

## 2020-02-14 DIAGNOSIS — L97.422 NON-PRESSURE CHRONIC ULCER OF LEFT HEEL AND MIDFOOT WITH FAT LAYER EXPOSED: ICD-10-CM

## 2020-02-14 DIAGNOSIS — E11.621 TYPE 2 DIABETES MELLITUS WITH FOOT ULCER: ICD-10-CM

## 2020-02-14 PROCEDURE — 99183 HYPERBARIC OXYGEN THERAPY: CPT

## 2020-02-14 PROCEDURE — G0277: CPT

## 2020-02-14 PROCEDURE — 82962 GLUCOSE BLOOD TEST: CPT

## 2020-02-14 NOTE — ASSESSMENT
[No dizziness or thirst] :  No dizziness or thirst [Patient descended without problem for 9 minutes] : Patient descended without problem for 9 minutes [Patient prepared for dive] : Patient prepared for dive [Vital signs stable] : Vital signs stable [Tolerating dive well] : Tolerating dive well [No ear problems] : No ear problems [No chest pain, shortness of breath, or ear pain] :  No chest pain, shortness of breath, or ear pain  [Respiratory Rate Stable] : Respiratory Rate Stable [No Chest Pain, shortness of breath] : No Chest Pain, shortness of breath [A physician was present throughout the entire HBOT] : A physician was present throughout the entire HBOT [Vital Signs stable] : Vital Signs stable [No] : No [Tolerated Ascent well] : Tolerated Ascent well [Continue Treatment Plan] : Continue treatment plan [Clinically Stable] : Clinically stable [0] : 0 out of 10

## 2020-02-14 NOTE — PROCEDURE
[Outpatient] : Outpatient [Wheelchair] : wheelchair [THIS CHAMBER HAS BEEN CLEANED / DISINFECTED] : This chamber has been cleaned / disinfected according to local and hospital policy and procedure prior to this treatment. [Patient demonstrated and verbalized proper technique for using air break mask] : Patient demonstrated and verbalized proper technique for using air break mask [Patient educated on the risks of CONSUMING ALCOHOL prior to HBOT with understanding] : Patient educated on the risks of CONSUMING ALCOHOL prior to HBOT with understanding [Patient educated on the risks of SMOKING prior to HBOT with understanding] : Patient educated on the risks of SMOKING prior to HBOT with understanding [Empty all pockets] : empty all pockets [100% Cotton] : 100% cotton [No hair oils, wigs, hairpieces, pins] : no hair oils, wigs, hairpieces, pins  [Pre tx medications] : pre tx medications  [No make-up, creams] : no make-up, creams  [Hearing aid removed] : hearing aid removed [No jewelry] : no jewelry  [No matches, cigarettes, lighters] : no matches, cigarettes, lighters  [Ground bracelet on pt's wrist] : ground bracelet on pt's wrist  [Dentures removed] : dentures removed [Remove nail polish] : remove nail polish  [Contacts removed] : contacts removed  [Bra, undergarments removed] : bra, undergarments removed  [No reading material] : no reading material  [No contraindicated dressings] : no contraindicated dressings [Ground Wire - VISUAL Verification - Intact/Free of Obstruction] : Ground Wire - VISUAL Verification - Intact/Free of Obstruction  [Ground Continuity - Verified < 1 ohm w/ Wrist Strap Barb] : Ground Continuity - Verified < 1 ohm w/ Wrist Strap Barb [Number: ___] : Number: [unfilled] [Diagnosis: ___] : Diagnosis: [unfilled] [____] : Post-Dive: Time - [unfilled] [___] : Post-Dive: Value - [unfilled] mg/dL [Clear all fields] : clear all fields [] : No [FreeTextEntry3] : 90 [FreeTextEntry5] : 8:14 [FreeTextEntry7] : 8:23 [de-identified] : 10:02 [de-identified] : 108 [FreeTextEntry9] : 9:53

## 2020-02-14 NOTE — ADDENDUM
[FreeTextEntry1] : Pt arrived via wheel chair from residence a&ox3\par All vitals within parameters for hbot.\par Drainage assessed by nurse prior to descent. Pt cleared for tx.\par Wound care to follow hbot.\par Pt descent to Rx tx depth was without incident.\par Pt resting, chest rise and fall observed.\par Pt ascent was without incident. Pt tolerated tx well.\par Dressing changed prior to pt leaving unit\par \par CHT MO GUERRERO 02/14/20 10:27

## 2020-02-17 ENCOUNTER — OUTPATIENT (OUTPATIENT)
Dept: OUTPATIENT SERVICES | Facility: HOSPITAL | Age: 45
LOS: 1 days | Discharge: HOME | End: 2020-02-17
Payer: COMMERCIAL

## 2020-02-17 ENCOUNTER — APPOINTMENT (OUTPATIENT)
Dept: HYPERBARIC MEDICINE | Facility: HOSPITAL | Age: 45
End: 2020-02-17
Payer: COMMERCIAL

## 2020-02-17 VITALS
RESPIRATION RATE: 18 BRPM | OXYGEN SATURATION: 100 % | DIASTOLIC BLOOD PRESSURE: 82 MMHG | HEART RATE: 86 BPM | TEMPERATURE: 97 F | SYSTOLIC BLOOD PRESSURE: 170 MMHG

## 2020-02-17 VITALS
DIASTOLIC BLOOD PRESSURE: 82 MMHG | HEART RATE: 79 BPM | RESPIRATION RATE: 18 BRPM | SYSTOLIC BLOOD PRESSURE: 164 MMHG | TEMPERATURE: 97.2 F | OXYGEN SATURATION: 100 %

## 2020-02-17 DIAGNOSIS — E11.621 TYPE 2 DIABETES MELLITUS WITH FOOT ULCER: ICD-10-CM

## 2020-02-17 DIAGNOSIS — I77.0 ARTERIOVENOUS FISTULA, ACQUIRED: Chronic | ICD-10-CM

## 2020-02-17 PROCEDURE — 82962 GLUCOSE BLOOD TEST: CPT

## 2020-02-17 PROCEDURE — G0277: CPT

## 2020-02-17 PROCEDURE — 99183 HYPERBARIC OXYGEN THERAPY: CPT

## 2020-02-18 ENCOUNTER — APPOINTMENT (OUTPATIENT)
Dept: HYPERBARIC MEDICINE | Facility: HOSPITAL | Age: 45
End: 2020-02-18
Payer: COMMERCIAL

## 2020-02-18 ENCOUNTER — OUTPATIENT (OUTPATIENT)
Dept: OUTPATIENT SERVICES | Facility: HOSPITAL | Age: 45
LOS: 1 days | Discharge: HOME | End: 2020-02-18
Payer: COMMERCIAL

## 2020-02-18 VITALS
HEART RATE: 75 BPM | DIASTOLIC BLOOD PRESSURE: 87 MMHG | SYSTOLIC BLOOD PRESSURE: 147 MMHG | TEMPERATURE: 97.3 F | OXYGEN SATURATION: 100 % | RESPIRATION RATE: 18 BRPM

## 2020-02-18 VITALS
SYSTOLIC BLOOD PRESSURE: 116 MMHG | DIASTOLIC BLOOD PRESSURE: 68 MMHG | OXYGEN SATURATION: 100 % | TEMPERATURE: 97.6 F | HEART RATE: 76 BPM | RESPIRATION RATE: 18 BRPM

## 2020-02-18 DIAGNOSIS — I77.0 ARTERIOVENOUS FISTULA, ACQUIRED: Chronic | ICD-10-CM

## 2020-02-18 DIAGNOSIS — E10.621 TYPE 1 DIABETES MELLITUS WITH FOOT ULCER: ICD-10-CM

## 2020-02-18 DIAGNOSIS — L97.422 NON-PRESSURE CHRONIC ULCER OF LEFT HEEL AND MIDFOOT WITH FAT LAYER EXPOSED: ICD-10-CM

## 2020-02-18 DIAGNOSIS — E11.621 TYPE 2 DIABETES MELLITUS WITH FOOT ULCER: ICD-10-CM

## 2020-02-18 DIAGNOSIS — Z79.4 LONG TERM (CURRENT) USE OF INSULIN: ICD-10-CM

## 2020-02-18 PROCEDURE — G0277: CPT

## 2020-02-18 PROCEDURE — 99183 HYPERBARIC OXYGEN THERAPY: CPT

## 2020-02-18 PROCEDURE — 82962 GLUCOSE BLOOD TEST: CPT

## 2020-02-18 NOTE — ADDENDUM
[FreeTextEntry1] : Drainage cleared to be changed post HBO tx. \par Pt descended to 2.0 RIVAS @ 1.75 PSI/min without incident in chamber #1.\par Pt resting @ depth with chest rise and fall observed throughout tx. \par Pt ascended from 2.0 RIVAS @ 1.75 PSI/min without incident. \par Pt tolerated tx well. Pt received wound care post HBO tx.

## 2020-02-18 NOTE — PROCEDURE
[Outpatient] : Outpatient [Wheelchair] : wheelchair [THIS CHAMBER HAS BEEN CLEANED / DISINFECTED] : This chamber has been cleaned / disinfected according to local and hospital policy and procedure prior to this treatment. [Patient demonstrated and verbalized proper technique for using air break mask] : Patient demonstrated and verbalized proper technique for using air break mask [Patient educated on the risks of SMOKING prior to HBOT with understanding] : Patient educated on the risks of SMOKING prior to HBOT with understanding [Patient educated on the risks of CONSUMING ALCOHOL prior to HBOT with understanding] : Patient educated on the risks of CONSUMING ALCOHOL prior to HBOT with understanding [100% Cotton] : 100% cotton [Empty all pockets] : empty all pockets [No hair oils, wigs, hairpieces, pins] : no hair oils, wigs, hairpieces, pins  [Pre tx medications] : pre tx medications  [No make-up, creams] : no make-up, creams  [No jewelry] : no jewelry  [No matches, cigarettes, lighters] : no matches, cigarettes, lighters  [Hearing aid removed] : hearing aid removed [Dentures removed] : dentures removed [Ground bracelet on pt's wrist] : ground bracelet on pt's wrist  [Contacts removed] : contacts removed  [Remove nail polish] : remove nail polish  [No reading material] : no reading material  [Bra, undergarments removed] : bra, undergarments removed  [No contraindicated dressings] : no contraindicated dressings [Ground Wire - VISUAL Verification - Intact/Free of Obstruction] : Ground Wire - VISUAL Verification - Intact/Free of Obstruction  [Ground Continuity - Verified < 1 ohm w/ Wrist Strap Barb] : Ground Continuity - Verified < 1 ohm w/ Wrist Strap Barb [Number: ___] : Number: [unfilled] [Diagnosis: ___] : Diagnosis: [unfilled] [____] : Post-Dive: Time - [unfilled] [___] : Post-Dive: Value - [unfilled] mg/dL [Clear all fields] : clear all fields [] : No [FreeTextEntry3] : 90 [FreeTextEntry5] : 3214 [FreeTextEntry7] : 9260 [FreeTextEntry9] : 2143 [de-identified] : 0982 [de-identified] : 108 min

## 2020-02-18 NOTE — ASSESSMENT
[Patient prepared for dive] : Patient prepared for dive [Patient descended without problem for 9 minutes] : Patient descended without problem for 9 minutes [No dizziness or thirst] :  No dizziness or thirst [No ear problems] : No ear problems [Vital signs stable] : Vital signs stable [Tolerating dive well] : Tolerating dive well [Respiratory Rate Stable] : Respiratory Rate Stable [No Chest Pain, shortness of breath] : No Chest Pain, shortness of breath [No chest pain, shortness of breath, or ear pain] :  No chest pain, shortness of breath, or ear pain  [Tolerated Ascent well] : Tolerated Ascent well [Vital Signs stable] : Vital Signs stable [A physician was present throughout the entire HBOT] : A physician was present throughout the entire HBOT [No] : No [Clinically Stable] : Clinically stable [Continue Treatment Plan] : Continue treatment plan [0] : 0 out of 10

## 2020-02-18 NOTE — PROCEDURE
[Ambulatory] : Patient is ambulatory. [Outpatient] : Outpatient [THIS CHAMBER HAS BEEN CLEANED / DISINFECTED] : This chamber has been cleaned / disinfected according to local and hospital policy and procedure prior to this treatment. [Patient demonstrated and verbalized proper technique for using air break mask] : Patient demonstrated and verbalized proper technique for using air break mask [Patient educated on the risks of SMOKING prior to HBOT with understanding] : Patient educated on the risks of SMOKING prior to HBOT with understanding [Patient educated on the risks of CONSUMING ALCOHOL prior to HBOT with understanding] : Patient educated on the risks of CONSUMING ALCOHOL prior to HBOT with understanding [100% Cotton] : 100% cotton [Empty all pockets] : empty all pockets [No hair oils, wigs, hairpieces, pins] : no hair oils, wigs, hairpieces, pins  [Pre tx medications] : pre tx medications  [No make-up, creams] : no make-up, creams  [No jewelry] : no jewelry  [No matches, cigarettes, lighters] : no matches, cigarettes, lighters  [Hearing aid removed] : hearing aid removed [Dentures removed] : dentures removed [Contacts removed] : contacts removed  [Ground bracelet on pt's wrist] : ground bracelet on pt's wrist  [Remove nail polish] : remove nail polish  [No contraindicated dressings] : no contraindicated dressings [Bra, undergarments removed] : bra, undergarments removed  [No reading material] : no reading material  [Ground Continuity - Verified < 1 ohm w/ Wrist Strap Barb] : Ground Continuity - Verified < 1 ohm w/ Wrist Strap Barb [Ground Wire - VISUAL Verification - Intact/Free of Obstruction] : Ground Wire - VISUAL Verification - Intact/Free of Obstruction  [Diagnosis: ___] : Diagnosis: [unfilled] [Number: ___] : Number: [unfilled] [____] : Post-Dive: Time - [unfilled] [___] : Post-Dive: Value - [unfilled] mg/dL [Clear all fields] : clear all fields [] : No [FreeTextEntry3] : 90 [FreeTextEntry5] : 3143 [FreeTextEntry7] : 1339 [FreeTextEntry9] : 4643 [de-identified] : 0929 [de-identified] : 108 min

## 2020-02-18 NOTE — ADDENDUM
[FreeTextEntry1] : Drainage cleared to be left in place today and checked tomorrow, 2/19.\par Pt descended to 2.0 RIVAS @ 1.75 PSI/min without incident in chamber #1. \par Pt resting @ depth with chest rise and fall observed throughout tx. \par Pt ascended from 2.0 RIVAS @ 1.75 PSI/min without incident. \par Pt tolerated tx well. Pt received ace wrap post HBO tx.

## 2020-02-19 ENCOUNTER — APPOINTMENT (OUTPATIENT)
Dept: HYPERBARIC MEDICINE | Facility: HOSPITAL | Age: 45
End: 2020-02-19
Payer: COMMERCIAL

## 2020-02-19 ENCOUNTER — OUTPATIENT (OUTPATIENT)
Dept: OUTPATIENT SERVICES | Facility: HOSPITAL | Age: 45
LOS: 1 days | Discharge: HOME | End: 2020-02-19
Payer: COMMERCIAL

## 2020-02-19 VITALS
BODY MASS INDEX: 27.78 KG/M2 | RESPIRATION RATE: 18 BRPM | SYSTOLIC BLOOD PRESSURE: 135 MMHG | DIASTOLIC BLOOD PRESSURE: 75 MMHG | TEMPERATURE: 97.3 F | HEART RATE: 80 BPM | OXYGEN SATURATION: 98 % | WEIGHT: 177 LBS | HEIGHT: 67 IN

## 2020-02-19 DIAGNOSIS — L97.423 NON-PRESSURE CHRONIC ULCER OF LEFT HEEL AND MIDFOOT WITH NECROSIS OF MUSCLE: ICD-10-CM

## 2020-02-19 DIAGNOSIS — I77.0 ARTERIOVENOUS FISTULA, ACQUIRED: Chronic | ICD-10-CM

## 2020-02-19 DIAGNOSIS — E11.621 TYPE 2 DIABETES MELLITUS WITH FOOT ULCER: ICD-10-CM

## 2020-02-19 PROCEDURE — 11043 DBRDMT MUSC&/FSCA 1ST 20/<: CPT

## 2020-02-20 ENCOUNTER — APPOINTMENT (OUTPATIENT)
Dept: HYPERBARIC MEDICINE | Facility: HOSPITAL | Age: 45
End: 2020-02-20
Payer: COMMERCIAL

## 2020-02-20 ENCOUNTER — OUTPATIENT (OUTPATIENT)
Dept: OUTPATIENT SERVICES | Facility: HOSPITAL | Age: 45
LOS: 1 days | Discharge: HOME | End: 2020-02-20
Payer: COMMERCIAL

## 2020-02-20 VITALS
OXYGEN SATURATION: 100 % | HEART RATE: 74 BPM | TEMPERATURE: 97.1 F | RESPIRATION RATE: 20 BRPM | DIASTOLIC BLOOD PRESSURE: 77 MMHG | SYSTOLIC BLOOD PRESSURE: 147 MMHG

## 2020-02-20 VITALS
RESPIRATION RATE: 20 BRPM | SYSTOLIC BLOOD PRESSURE: 101 MMHG | HEART RATE: 77 BPM | TEMPERATURE: 97.2 F | DIASTOLIC BLOOD PRESSURE: 61 MMHG | OXYGEN SATURATION: 100 %

## 2020-02-20 DIAGNOSIS — E11.621 TYPE 2 DIABETES MELLITUS WITH FOOT ULCER: ICD-10-CM

## 2020-02-20 DIAGNOSIS — I77.0 ARTERIOVENOUS FISTULA, ACQUIRED: Chronic | ICD-10-CM

## 2020-02-20 PROBLEM — L97.423: Status: ACTIVE | Noted: 2019-11-05

## 2020-02-20 PROBLEM — L97.423 CHRONIC ULCER OF LEFT HEEL WITH NECROSIS OF MUSCLE: Status: ACTIVE | Noted: 2020-02-20

## 2020-02-20 PROCEDURE — 82962 GLUCOSE BLOOD TEST: CPT

## 2020-02-20 PROCEDURE — 99183 HYPERBARIC OXYGEN THERAPY: CPT

## 2020-02-20 PROCEDURE — 73630 X-RAY EXAM OF FOOT: CPT | Mod: 26,50

## 2020-02-20 PROCEDURE — 73630 X-RAY EXAM OF FOOT: CPT

## 2020-02-20 PROCEDURE — G0277: CPT

## 2020-02-20 NOTE — PROCEDURE
[Saline] : saline [Fibrotic] : fibrotic [Slough] : slough [Necrotic] : necrotic [Scalpel] : scalpel [Sharp scissors] : sharp scissors [Sharp curette] : sharp curette [Skin] : skin [Fat] : fat [Fascia] : fascia [Subcutaneous tissue] : subcutaneous tissue [Clean] : clean [Pressure] : pressure [FreeTextEntry2] : left posterior heel DFU 3  [FreeTextEntry1] : silver alginate  [] : No [FreeTextEntry9] : 9:43am  [de-identified] : DFU 3 posterior left heel  [de-identified] : Chao [FreeTextEntry6] : DFU 3 left heel  [FreeTextEntry7] : same  [de-identified] : 0 [de-identified] : 10 cc  [de-identified] : necrotic skin, subcutaneous , fat , fascia

## 2020-02-20 NOTE — ASSESSMENT
[Patient prepared for dive] : Patient prepared for dive [Patient undergoing HBO treatment for __________] : Patient undergoing HBO treatment for [unfilled] [Patient descended without problem for 9 minutes] : Patient descended without problem for 9 minutes [No dizziness or thirst] :  No dizziness or thirst [No ear problems] : No ear problems [Vital signs stable] : Vital signs stable [Tolerating dive well] : Tolerating dive well [No Chest Pain, shortness of breath] : No Chest Pain, shortness of breath [Respiratory Rate Stable] : Respiratory Rate Stable [No chest pain, shortness of breath, or ear pain] :  No chest pain, shortness of breath, or ear pain  [Tolerated Ascent well] : Tolerated Ascent well [A physician was present throughout the entire HBOT] : A physician was present throughout the entire HBOT [Vital Signs stable] : Vital Signs stable [No change from previous assessment] : No change from previous assessment [No] : No [Clinically Stable] : Clinically stable [Continue Treatment Plan] : Continue treatment plan [0] : 0 out of 10

## 2020-02-20 NOTE — REVIEW OF SYSTEMS
[Joint Stiffness] : joint stiffness [Skin Wound] : skin wound [Negative] : Cardiovascular [Fever] : no fever [Chills] : no chills [Loss Of Hearing] : no hearing loss [Abdominal Pain] : no abdominal pain [Shortness Of Breath] : no shortness of breath [Anxiety] : no anxiety [Easy Bleeding] : no tendency for easy bleeding [de-identified] : DFU 3 plantar left heel , skin, subcutaneous , fat , fascia  [de-identified] : IDDM with neuropathy  [de-identified] : DEE DEE

## 2020-02-20 NOTE — PROCEDURE
[Outpatient] : Outpatient [Wheelchair] : wheelchair [THIS CHAMBER HAS BEEN CLEANED / DISINFECTED] : This chamber has been cleaned / disinfected according to local and hospital policy and procedure prior to this treatment. [Patient demonstrated and verbalized proper technique for using air break mask] : Patient demonstrated and verbalized proper technique for using air break mask [Patient educated on the risks of SMOKING prior to HBOT with understanding] : Patient educated on the risks of SMOKING prior to HBOT with understanding [Patient educated on the risks of CONSUMING ALCOHOL prior to HBOT with understanding] : Patient educated on the risks of CONSUMING ALCOHOL prior to HBOT with understanding [100% Cotton] : 100% cotton [Empty all pockets] : empty all pockets [No hair oils, wigs, hairpieces, pins] : no hair oils, wigs, hairpieces, pins  [Pre tx medications] : pre tx medications  [No make-up, creams] : no make-up, creams  [No jewelry] : no jewelry  [No matches, cigarettes, lighters] : no matches, cigarettes, lighters  [Hearing aid removed] : hearing aid removed [Dentures removed] : dentures removed [Ground bracelet on pt's wrist] : ground bracelet on pt's wrist  [Contacts removed] : contacts removed  [Remove nail polish] : remove nail polish  [No reading material] : no reading material  [Bra, undergarments removed] : bra, undergarments removed  [No contraindicated dressings] : no contraindicated dressings [Ground Wire - VISUAL Verification - Intact/Free of Obstruction] : Ground Wire - VISUAL Verification - Intact/Free of Obstruction  [Ground Continuity - Verified < 1 ohm w/ Wrist Strap Barb] : Ground Continuity - Verified < 1 ohm w/ Wrist Strap Barb [Number: ___] : Number: [unfilled] [Diagnosis: ___] : Diagnosis: [unfilled] [____] : Post-Dive: Time - [unfilled] [___] : Post-Dive: Value - [unfilled] mg/dL [Clear all fields] : clear all fields [] : No [FreeTextEntry3] : 90 [FreeTextEntry5] : 3351 [FreeTextEntry7] : 2521 [FreeTextEntry9] : 6751 [de-identified] : 0958 [de-identified] : 108mins

## 2020-02-20 NOTE — PROCEDURE
[Saline] : saline [Fibrotic] : fibrotic [Slough] : slough [Necrotic] : necrotic [Scalpel] : scalpel [Sharp scissors] : sharp scissors [Sharp curette] : sharp curette [Skin] : skin [Fat] : fat [Fascia] : fascia [Subcutaneous tissue] : subcutaneous tissue [Clean] : clean [Pressure] : pressure [FreeTextEntry2] : left posterior heel DFU 3  [FreeTextEntry1] : silver alginate  [] : No [FreeTextEntry9] : 9:43am  [de-identified] : DFU 3 posterior left heel  [de-identified] : Chao [FreeTextEntry6] : DFU 3 left heel  [FreeTextEntry7] : same  [de-identified] : 0 [de-identified] : 10 cc  [de-identified] : necrotic skin, subcutaneous , fat , fascia

## 2020-02-20 NOTE — ADDENDUM
[FreeTextEntry1] : Pt descended to 2.0 martín @ 1.75 psi/min without incident in chamber # 1. \par Pt resting comfortably @ depth, equal chest rise observed throughout tx.\par Pt ascended from 2.0 martín @ 1.75 psi/min without incident in chamber # 1.\par Pt tolerated treatment well, without incident. \par Pt transported to Radiology post tx for Xray. \par

## 2020-02-21 ENCOUNTER — OUTPATIENT (OUTPATIENT)
Dept: OUTPATIENT SERVICES | Facility: HOSPITAL | Age: 45
LOS: 1 days | Discharge: HOME | End: 2020-02-21
Payer: COMMERCIAL

## 2020-02-21 ENCOUNTER — APPOINTMENT (OUTPATIENT)
Dept: HYPERBARIC MEDICINE | Facility: HOSPITAL | Age: 45
End: 2020-02-21
Payer: COMMERCIAL

## 2020-02-21 VITALS
HEART RATE: 78 BPM | DIASTOLIC BLOOD PRESSURE: 81 MMHG | SYSTOLIC BLOOD PRESSURE: 156 MMHG | RESPIRATION RATE: 20 BRPM | TEMPERATURE: 96.8 F | OXYGEN SATURATION: 100 %

## 2020-02-21 VITALS
TEMPERATURE: 97.2 F | HEART RATE: 83 BPM | OXYGEN SATURATION: 100 % | RESPIRATION RATE: 20 BRPM | SYSTOLIC BLOOD PRESSURE: 134 MMHG | DIASTOLIC BLOOD PRESSURE: 73 MMHG

## 2020-02-21 DIAGNOSIS — E10.621 TYPE 1 DIABETES MELLITUS WITH FOOT ULCER: ICD-10-CM

## 2020-02-21 DIAGNOSIS — E11.621 TYPE 2 DIABETES MELLITUS WITH FOOT ULCER: ICD-10-CM

## 2020-02-21 DIAGNOSIS — Z79.4 LONG TERM (CURRENT) USE OF INSULIN: ICD-10-CM

## 2020-02-21 DIAGNOSIS — L97.423 NON-PRESSURE CHRONIC ULCER OF LEFT HEEL AND MIDFOOT WITH NECROSIS OF MUSCLE: ICD-10-CM

## 2020-02-21 DIAGNOSIS — N19 UNSPECIFIED KIDNEY FAILURE: ICD-10-CM

## 2020-02-21 DIAGNOSIS — E10.29 TYPE 1 DIABETES MELLITUS WITH OTHER DIABETIC KIDNEY COMPLICATION: ICD-10-CM

## 2020-02-21 DIAGNOSIS — Z98.49 CATARACT EXTRACTION STATUS, UNSPECIFIED EYE: ICD-10-CM

## 2020-02-21 DIAGNOSIS — E10.40 TYPE 1 DIABETES MELLITUS WITH DIABETIC NEUROPATHY, UNSPECIFIED: ICD-10-CM

## 2020-02-21 DIAGNOSIS — I83.93 ASYMPTOMATIC VARICOSE VEINS OF BILATERAL LOWER EXTREMITIES: ICD-10-CM

## 2020-02-21 DIAGNOSIS — Z89.429 ACQUIRED ABSENCE OF OTHER TOE(S), UNSPECIFIED SIDE: ICD-10-CM

## 2020-02-21 DIAGNOSIS — Z79.899 OTHER LONG TERM (CURRENT) DRUG THERAPY: ICD-10-CM

## 2020-02-21 DIAGNOSIS — Z99.2 DEPENDENCE ON RENAL DIALYSIS: ICD-10-CM

## 2020-02-21 DIAGNOSIS — I77.0 ARTERIOVENOUS FISTULA, ACQUIRED: Chronic | ICD-10-CM

## 2020-02-21 DIAGNOSIS — L97.422 NON-PRESSURE CHRONIC ULCER OF LEFT HEEL AND MIDFOOT WITH FAT LAYER EXPOSED: ICD-10-CM

## 2020-02-21 PROCEDURE — 82962 GLUCOSE BLOOD TEST: CPT

## 2020-02-21 PROCEDURE — 99183 HYPERBARIC OXYGEN THERAPY: CPT

## 2020-02-21 PROCEDURE — G0277: CPT

## 2020-02-21 NOTE — ASSESSMENT
[No change from previous assessment] : No change from previous assessment [Patient prepared for dive] : Patient prepared for dive [Patient descended without problem for 9 minutes] : Patient descended without problem for 9 minutes [Patient undergoing HBO treatment for __________] : Patient undergoing HBO treatment for [unfilled] [No dizziness or thirst] :  No dizziness or thirst [Vital signs stable] : Vital signs stable [No ear problems] : No ear problems [Tolerating dive well] : Tolerating dive well [No Chest Pain, shortness of breath] : No Chest Pain, shortness of breath [Respiratory Rate Stable] : Respiratory Rate Stable [Tolerated Ascent well] : Tolerated Ascent well [No chest pain, shortness of breath, or ear pain] :  No chest pain, shortness of breath, or ear pain  [Vital Signs stable] : Vital Signs stable [No] : No [A physician was present throughout the entire HBOT] : A physician was present throughout the entire HBOT [Clinically Stable] : Clinically stable [Continue Treatment Plan] : Continue treatment plan [0] : 0 out of 10 [FreeTextEntry2] : None

## 2020-02-21 NOTE — PROCEDURE
[Outpatient] : Outpatient [Wheelchair] : wheelchair [THIS CHAMBER HAS BEEN CLEANED / DISINFECTED] : This chamber has been cleaned / disinfected according to local and hospital policy and procedure prior to this treatment. [Patient demonstrated and verbalized proper technique for using air break mask] : Patient demonstrated and verbalized proper technique for using air break mask [Patient educated on the risks of SMOKING prior to HBOT with understanding] : Patient educated on the risks of SMOKING prior to HBOT with understanding [Patient educated on the risks of CONSUMING ALCOHOL prior to HBOT with understanding] : Patient educated on the risks of CONSUMING ALCOHOL prior to HBOT with understanding [100% Cotton] : 100% cotton [No hair oils, wigs, hairpieces, pins] : no hair oils, wigs, hairpieces, pins  [Empty all pockets] : empty all pockets [Pre tx medications] : pre tx medications  [No jewelry] : no jewelry  [No make-up, creams] : no make-up, creams  [No matches, cigarettes, lighters] : no matches, cigarettes, lighters  [Hearing aid removed] : hearing aid removed [Dentures removed] : dentures removed [Ground bracelet on pt's wrist] : ground bracelet on pt's wrist  [Remove nail polish] : remove nail polish  [Contacts removed] : contacts removed  [No reading material] : no reading material  [Bra, undergarments removed] : bra, undergarments removed  [No contraindicated dressings] : no contraindicated dressings [Ground Wire - VISUAL Verification - Intact/Free of Obstruction] : Ground Wire - VISUAL Verification - Intact/Free of Obstruction  [Ground Continuity - Verified < 1 ohm w/ Wrist Strap Barb] : Ground Continuity - Verified < 1 ohm w/ Wrist Strap Barb [Number: ___] : Number: [unfilled] [Diagnosis: ___] : Diagnosis: [unfilled] [____] : Post-Dive: Time - [unfilled] [___] : Post-Dive: Value - [unfilled] mg/dL [Clear all fields] : clear all fields [] : No [FreeTextEntry5] : 8:00 [FreeTextEntry3] : 90 [FreeTextEntry7] : 8:09 [FreeTextEntry9] : 9:39 [de-identified] : 9:48 [de-identified] : 108

## 2020-02-21 NOTE — ADDENDUM
[FreeTextEntry1] : PT ARRIVED VIA WHEEL CHAIR FROM [facility] A[facility]OX[facility].\par ALL VITALS WITHIN PARAMETERS FOR HBOT.\par DRAINAGE ASSESSED BY RN PRIOR TO DESCENT. WOUND CARE TO FOLLOW HBOT.\par PT DESCENT TO RX DEPTH IN CHAMBER 1 WAS WITHOUT INCIDENT. PT RESTING AT TX DEPTH. CHEST RISE AND FALL OBSERVED\par PT ASCENT FROM RX TX DEPTH WAS WITHOUT INCIDENT. PT TOLERATED HBOT WELL\par DRESSING CHANGED POST HBOT\par \par CHT MO BATES 02/21/20

## 2020-02-22 DIAGNOSIS — Z79.4 LONG TERM (CURRENT) USE OF INSULIN: ICD-10-CM

## 2020-02-22 DIAGNOSIS — L97.422 NON-PRESSURE CHRONIC ULCER OF LEFT HEEL AND MIDFOOT WITH FAT LAYER EXPOSED: ICD-10-CM

## 2020-02-22 DIAGNOSIS — E10.621 TYPE 1 DIABETES MELLITUS WITH FOOT ULCER: ICD-10-CM

## 2020-02-24 ENCOUNTER — OUTPATIENT (OUTPATIENT)
Dept: OUTPATIENT SERVICES | Facility: HOSPITAL | Age: 45
LOS: 1 days | Discharge: HOME | End: 2020-02-24
Payer: COMMERCIAL

## 2020-02-24 ENCOUNTER — APPOINTMENT (OUTPATIENT)
Dept: HYPERBARIC MEDICINE | Facility: HOSPITAL | Age: 45
End: 2020-02-24

## 2020-02-24 ENCOUNTER — APPOINTMENT (OUTPATIENT)
Dept: HYPERBARIC MEDICINE | Facility: HOSPITAL | Age: 45
End: 2020-02-24
Payer: COMMERCIAL

## 2020-02-24 ENCOUNTER — NON-APPOINTMENT (OUTPATIENT)
Age: 45
End: 2020-02-24

## 2020-02-24 VITALS
BODY MASS INDEX: 27.78 KG/M2 | SYSTOLIC BLOOD PRESSURE: 157 MMHG | HEART RATE: 80 BPM | DIASTOLIC BLOOD PRESSURE: 75 MMHG | WEIGHT: 177 LBS | HEIGHT: 67 IN | TEMPERATURE: 97.4 F | OXYGEN SATURATION: 100 % | RESPIRATION RATE: 20 BRPM

## 2020-02-24 VITALS
RESPIRATION RATE: 18 BRPM | DIASTOLIC BLOOD PRESSURE: 79 MMHG | HEART RATE: 79 BPM | OXYGEN SATURATION: 100 % | TEMPERATURE: 97.6 F | SYSTOLIC BLOOD PRESSURE: 174 MMHG

## 2020-02-24 DIAGNOSIS — I77.0 ARTERIOVENOUS FISTULA, ACQUIRED: Chronic | ICD-10-CM

## 2020-02-24 DIAGNOSIS — E11.621 TYPE 2 DIABETES MELLITUS WITH FOOT ULCER: ICD-10-CM

## 2020-02-24 PROCEDURE — 82962 GLUCOSE BLOOD TEST: CPT

## 2020-02-24 PROCEDURE — 99213 OFFICE O/P EST LOW 20 MIN: CPT

## 2020-02-24 PROCEDURE — G0277: CPT

## 2020-02-24 PROCEDURE — 99213 OFFICE O/P EST LOW 20 MIN: CPT | Mod: 25

## 2020-02-24 PROCEDURE — 99183 HYPERBARIC OXYGEN THERAPY: CPT

## 2020-02-24 NOTE — HISTORY OF PRESENT ILLNESS
[FreeTextEntry1] : Patient with multiple complications from IDDM  patient has had multiple foot surgeries and presently has DFU3  posterior left heel being treated with HBOT and apligraf. Pt seen for right posterior heel dfu 2 ulceration down to skin and subcutaneous skin at this time.

## 2020-02-24 NOTE — PHYSICAL EXAM
[0] : left 0 [1+] : left 1+ [Ankle Swelling On The Right] : mild [Varicose Veins Of Lower Extremities] : bilaterally [No Rash or Lesion] : No rash or lesion [Skin Ulcer] : ulcer [Calm] : calm [Purpura] : no purpura  [Petechiae] : no petechiae [de-identified] : comfortable  [de-identified] : previous calcanectomy of the right heel , possible OM of the left heel , OM of the right pinkie finger  [de-identified] : Diabetic small vessel and cardio vascular disease  [de-identified] : DFU 3 plantar posterior left and right heel down to skin, subcutaneous tissue, and fat [de-identified] : Diabetic neuropathy , Dialysis  3x per week  [de-identified] : Serosanguineous [FreeTextEntry1] : Left Posterior Heel [de-identified] : Callus [de-identified] : Zeina [de-identified] : Cleansed with Normal Saline\par  [FreeTextEntry7] : Right Posterior Heel [FreeTextEntry8] : 1.5 [FreeTextEntry9] : 4.5 [de-identified] : 1.0 [de-identified] : Callus [de-identified] : Serosanguineous [de-identified] : Silver Alginate [de-identified] : Cleansed with Normal Saline\par  [TWNoteComboBox5] : No [TWNoteComboBox4] : Moderate [de-identified] : No [de-identified] : None [de-identified] : None [de-identified] : 100% [de-identified] : No [de-identified] : 3x Weekly [de-identified] : Ace wraps [de-identified] : Moderate [de-identified] : No [de-identified] : No [de-identified] : None [de-identified] : None [de-identified] : 100% [de-identified] : No [de-identified] : 3x Weekly [de-identified] : Ace wraps

## 2020-02-24 NOTE — PLAN
[FreeTextEntry1] : wound care , off loading\par Xray of right foot\par patient to continue HBOT\par

## 2020-02-24 NOTE — ASSESSMENT
[] : No [FreeTextEntry2] : Restore Skin Integrity\par Infection Control\par Localized wound care\par Compression Therapy\par Discussion regarding acceptable pain tolerance of 0/10\par  [FreeTextEntry4] : HBOT 55/60\par Pt presented today with moderate Serosanguineous on white sock, MD assessed. \par MD submitted for xray of Right Heel. pt stated understanding.\par F/U to [facility] for Daily HBOT\par

## 2020-02-24 NOTE — REVIEW OF SYSTEMS
[Arthralgias] : arthralgias [Joint Stiffness] : joint stiffness [Skin Wound] : skin wound [Chills] : no chills [Fever] : no fever [Loss Of Hearing] : no hearing loss [Shortness Of Breath] : no shortness of breath [Abdominal Pain] : no abdominal pain [Vomiting] : no vomiting [Anxiety] : no anxiety [FreeTextEntry5] : diabetic small vessel diseas and cardio vascular disease  [Easy Bleeding] : no tendency for easy bleeding [de-identified] : IDDM with neuropathy  [de-identified] : DFU 3 plantar posterior left and right heel down to skin, subcutaneous tissue, and fat [de-identified] : IDDM , patient on Dialysis

## 2020-02-25 ENCOUNTER — APPOINTMENT (OUTPATIENT)
Dept: HYPERBARIC MEDICINE | Facility: HOSPITAL | Age: 45
End: 2020-02-25
Payer: COMMERCIAL

## 2020-02-25 ENCOUNTER — OUTPATIENT (OUTPATIENT)
Dept: OUTPATIENT SERVICES | Facility: HOSPITAL | Age: 45
LOS: 1 days | Discharge: HOME | End: 2020-02-25
Payer: COMMERCIAL

## 2020-02-25 VITALS
HEART RATE: 74 BPM | TEMPERATURE: 97.7 F | DIASTOLIC BLOOD PRESSURE: 79 MMHG | RESPIRATION RATE: 18 BRPM | OXYGEN SATURATION: 96 % | SYSTOLIC BLOOD PRESSURE: 129 MMHG

## 2020-02-25 VITALS
SYSTOLIC BLOOD PRESSURE: 119 MMHG | DIASTOLIC BLOOD PRESSURE: 65 MMHG | OXYGEN SATURATION: 100 % | RESPIRATION RATE: 18 BRPM | HEART RATE: 78 BPM | TEMPERATURE: 97.1 F

## 2020-02-25 DIAGNOSIS — Z79.4 LONG TERM (CURRENT) USE OF INSULIN: ICD-10-CM

## 2020-02-25 DIAGNOSIS — Z99.2 DEPENDENCE ON RENAL DIALYSIS: ICD-10-CM

## 2020-02-25 DIAGNOSIS — Z98.49 CATARACT EXTRACTION STATUS, UNSPECIFIED EYE: ICD-10-CM

## 2020-02-25 DIAGNOSIS — E10.621 TYPE 1 DIABETES MELLITUS WITH FOOT ULCER: ICD-10-CM

## 2020-02-25 DIAGNOSIS — I77.0 ARTERIOVENOUS FISTULA, ACQUIRED: Chronic | ICD-10-CM

## 2020-02-25 DIAGNOSIS — Z79.899 OTHER LONG TERM (CURRENT) DRUG THERAPY: ICD-10-CM

## 2020-02-25 DIAGNOSIS — E10.29 TYPE 1 DIABETES MELLITUS WITH OTHER DIABETIC KIDNEY COMPLICATION: ICD-10-CM

## 2020-02-25 DIAGNOSIS — L97.422 NON-PRESSURE CHRONIC ULCER OF LEFT HEEL AND MIDFOOT WITH FAT LAYER EXPOSED: ICD-10-CM

## 2020-02-25 DIAGNOSIS — Z89.422 ACQUIRED ABSENCE OF OTHER LEFT TOE(S): ICD-10-CM

## 2020-02-25 DIAGNOSIS — I83.93 ASYMPTOMATIC VARICOSE VEINS OF BILATERAL LOWER EXTREMITIES: ICD-10-CM

## 2020-02-25 DIAGNOSIS — N19 UNSPECIFIED KIDNEY FAILURE: ICD-10-CM

## 2020-02-25 DIAGNOSIS — E11.621 TYPE 2 DIABETES MELLITUS WITH FOOT ULCER: ICD-10-CM

## 2020-02-25 DIAGNOSIS — E10.40 TYPE 1 DIABETES MELLITUS WITH DIABETIC NEUROPATHY, UNSPECIFIED: ICD-10-CM

## 2020-02-25 PROCEDURE — 99183 HYPERBARIC OXYGEN THERAPY: CPT

## 2020-02-25 PROCEDURE — 82962 GLUCOSE BLOOD TEST: CPT

## 2020-02-25 PROCEDURE — G0277: CPT

## 2020-02-25 NOTE — ASSESSMENT
[No change from previous assessment] : No change from previous assessment [Patient prepared for dive] : Patient prepared for dive [Patient descended without problem for 9 minutes] : Patient descended without problem for 9 minutes [No dizziness or thirst] :  No dizziness or thirst [No ear problems] : No ear problems [Vital signs stable] : Vital signs stable [Tolerating dive well] : Tolerating dive well [No Chest Pain, shortness of breath] : No Chest Pain, shortness of breath [Respiratory Rate Stable] : Respiratory Rate Stable [No chest pain, shortness of breath, or ear pain] :  No chest pain, shortness of breath, or ear pain  [Tolerated Ascent well] : Tolerated Ascent well [Vital Signs stable] : Vital Signs stable [A physician was present throughout the entire HBOT] : A physician was present throughout the entire HBOT [No] : No [Clinically Stable] : Clinically stable [Continue Treatment Plan] : Continue treatment plan [0] : 0 out of 10

## 2020-02-25 NOTE — PROCEDURE
[Outpatient] : Outpatient [Ambulatory] : Patient is ambulatory. [THIS CHAMBER HAS BEEN CLEANED / DISINFECTED] : This chamber has been cleaned / disinfected according to local and hospital policy and procedure prior to this treatment. [Patient educated on the risks of SMOKING prior to HBOT with understanding] : Patient educated on the risks of SMOKING prior to HBOT with understanding [Patient demonstrated and verbalized proper technique for using air break mask] : Patient demonstrated and verbalized proper technique for using air break mask [Patient educated on the risks of CONSUMING ALCOHOL prior to HBOT with understanding] : Patient educated on the risks of CONSUMING ALCOHOL prior to HBOT with understanding [100% Cotton] : 100% cotton [Empty all pockets] : empty all pockets [No hair oils, wigs, hairpieces, pins] : no hair oils, wigs, hairpieces, pins  [Pre tx medications] : pre tx medications  [No make-up, creams] : no make-up, creams  [No jewelry] : no jewelry  [No matches, cigarettes, lighters] : no matches, cigarettes, lighters  [Hearing aid removed] : hearing aid removed [Dentures removed] : dentures removed [Ground bracelet on pt's wrist] : ground bracelet on pt's wrist  [Contacts removed] : contacts removed  [Remove nail polish] : remove nail polish  [No reading material] : no reading material  [Bra, undergarments removed] : bra, undergarments removed  [No contraindicated dressings] : no contraindicated dressings [Ground Wire - VISUAL Verification - Intact/Free of Obstruction] : Ground Wire - VISUAL Verification - Intact/Free of Obstruction  [Ground Continuity - Verified < 1 ohm w/ Wrist Strap Barb] : Ground Continuity - Verified < 1 ohm w/ Wrist Strap Barb [Number: ___] : Number: [unfilled] [Diagnosis: ___] : Diagnosis: [unfilled] [____] : Post-Dive: Time - [unfilled] [___] : Post-Dive: Value - [unfilled] mg/dL [Clear all fields] : clear all fields [] : No [FreeTextEntry3] : 90 [FreeTextEntry5] : 9307 [FreeTextEntry7] : 6264 [FreeTextEntry9] : 1007 [de-identified] : 1016 [de-identified] : 108 min.

## 2020-02-25 NOTE — ADDENDUM
[FreeTextEntry1] : Prior to dive, drainage noticed on pt's dressing. RN notified. Pt clear to dive. Pt received wound care post HBOT.\par Pt descended to depth without incident in chamber # 1. \par Pt is resting @ depth with chest rise and fall observed @ chamber side.\par Transfer of Observation from [facility] to [facility] immediately prior to start of the patient's ascent. \par The patient ascended from treatment depth to surface without incident.\par The patient received wound care x LPN post-HBOT. \par The patient tolerated HBOT without incident. \par \par -- SHAWN WILLINGHAM, [facility] // 25 FEB 2020 @ 10 : 48

## 2020-02-26 ENCOUNTER — OUTPATIENT (OUTPATIENT)
Dept: OUTPATIENT SERVICES | Facility: HOSPITAL | Age: 45
LOS: 1 days | Discharge: HOME | End: 2020-02-26
Payer: COMMERCIAL

## 2020-02-26 ENCOUNTER — APPOINTMENT (OUTPATIENT)
Dept: HYPERBARIC MEDICINE | Facility: HOSPITAL | Age: 45
End: 2020-02-26
Payer: COMMERCIAL

## 2020-02-26 VITALS
WEIGHT: 178 LBS | DIASTOLIC BLOOD PRESSURE: 69 MMHG | BODY MASS INDEX: 27.94 KG/M2 | HEART RATE: 81 BPM | TEMPERATURE: 97.3 F | HEIGHT: 67 IN | RESPIRATION RATE: 18 BRPM | SYSTOLIC BLOOD PRESSURE: 124 MMHG | OXYGEN SATURATION: 100 %

## 2020-02-26 DIAGNOSIS — Z79.4 LONG TERM (CURRENT) USE OF INSULIN: ICD-10-CM

## 2020-02-26 DIAGNOSIS — E10.621 TYPE 1 DIABETES MELLITUS WITH FOOT ULCER: ICD-10-CM

## 2020-02-26 DIAGNOSIS — E11.621 TYPE 2 DIABETES MELLITUS WITH FOOT ULCER: ICD-10-CM

## 2020-02-26 DIAGNOSIS — I77.0 ARTERIOVENOUS FISTULA, ACQUIRED: Chronic | ICD-10-CM

## 2020-02-26 DIAGNOSIS — L97.422 NON-PRESSURE CHRONIC ULCER OF LEFT HEEL AND MIDFOOT WITH FAT LAYER EXPOSED: ICD-10-CM

## 2020-02-26 PROCEDURE — 99212 OFFICE O/P EST SF 10 MIN: CPT

## 2020-02-26 PROCEDURE — ZZZZZ: CPT

## 2020-02-26 PROCEDURE — 73630 X-RAY EXAM OF FOOT: CPT

## 2020-02-26 PROCEDURE — G0463: CPT

## 2020-02-26 PROCEDURE — 73630 X-RAY EXAM OF FOOT: CPT | Mod: 26,RT

## 2020-02-26 NOTE — PROCEDURE
[Outpatient] : Outpatient [Wheelchair] : wheelchair [THIS CHAMBER HAS BEEN CLEANED / DISINFECTED] : This chamber has been cleaned / disinfected according to local and hospital policy and procedure prior to this treatment. [Deferred Treatment] : Deferred Treatment [Patient demonstrated and verbalized proper technique for using air break mask] : Patient demonstrated and verbalized proper technique for using air break mask [Patient educated on the risks of SMOKING prior to HBOT with understanding] : Patient educated on the risks of SMOKING prior to HBOT with understanding [Patient educated on the risks of CONSUMING ALCOHOL prior to HBOT with understanding] : Patient educated on the risks of CONSUMING ALCOHOL prior to HBOT with understanding [100% Cotton] : 100% cotton [Empty all pockets] : empty all pockets [No hair oils, wigs, hairpieces, pins] : no hair oils, wigs, hairpieces, pins  [Pre tx medications] : pre tx medications  [No make-up, creams] : no make-up, creams  [No jewelry] : no jewelry  [No matches, cigarettes, lighters] : no matches, cigarettes, lighters  [Hearing aid removed] : hearing aid removed [Dentures removed] : dentures removed [Ground bracelet on pt's wrist] : ground bracelet on pt's wrist  [Contacts removed] : contacts removed  [Remove nail polish] : remove nail polish  [No reading material] : no reading material  [Bra, undergarments removed] : bra, undergarments removed  [No contraindicated dressings] : no contraindicated dressings [Ground Wire - VISUAL Verification - Intact/Free of Obstruction] : Ground Wire - VISUAL Verification - Intact/Free of Obstruction  [Ground Continuity - Verified < 1 ohm w/ Wrist Strap Barb] : Ground Continuity - Verified < 1 ohm w/ Wrist Strap Barb [Clear all fields] : clear all fields [Number: ___] : Number: [unfilled] [Diagnosis: ___] : Diagnosis: [unfilled] [] : No [de-identified] : Pt thought assessment was today. Pt offered to go into chamber due to open chambers next round. Pt refused due to transport being set up already. [FreeTextEntry3] : 0 [FreeTextEntry7] : -- [FreeTextEntry5] : -- [de-identified] : -- [FreeTextEntry9] : -- [de-identified] : 0 min

## 2020-02-26 NOTE — ASSESSMENT
[Verbal] : Verbal [Patient] : Patient [Good - alert, interested, motivated] : Good - alert, interested, motivated [Verbalizes knowledge/Understanding] : Verbalizes knowledge/understanding [Dressing changes] : dressing changes [Foot Care] : foot care [Skin Care] : skin care [Signs and symptoms of infection] : sign and symptoms of infection [How and When to Call] : how and when to call [Patient responsibility to plan of care] : patient responsibility to plan of care [Compression Therapy] : compression therapy [Stable] : stable [Home] : Home [Not Applicable - Long Term Care/Home Health Agency] : Long Term Care/Home Health Agency: Not Applicable [Wheelchair] : Wheelchair [Patient undergoing HBO treatment for __________] : Patient undergoing HBO treatment for [unfilled] [Patient descended without problem for 9 minutes] : Patient descended without problem for 9 minutes [No ear problems] : No ear problems [No dizziness or thirst] :  No dizziness or thirst [No Chest Pain, shortness of breath] : No Chest Pain, shortness of breath [Tolerating dive well] : Tolerating dive well [Vital signs stable] : Vital signs stable [Tolerated Ascent well] : Tolerated Ascent well [Respiratory Rate Stable] : Respiratory Rate Stable [Vital Signs stable] : Vital Signs stable [Clinically Stable] : Clinically stable [No] : No [A physician was present throughout the entire HBOT] : A physician was present throughout the entire HBOT [0] : 0 out of 10 [Continue Treatment Plan] : Continue treatment plan

## 2020-02-26 NOTE — ASSESSMENT
[No change from previous assessment] : No change from previous assessment [No ear problems] : No ear problems [Tolerating dive well] : Tolerating dive well [Vital signs stable] : Vital signs stable [No Chest Pain, shortness of breath] : No Chest Pain, shortness of breath [Tolerated Ascent well] : Tolerated Ascent well [No chest pain, shortness of breath, or ear pain] :  No chest pain, shortness of breath, or ear pain  [Respiratory Rate Stable] : Respiratory Rate Stable [A physician was present throughout the entire HBOT] : A physician was present throughout the entire HBOT [Vital Signs stable] : Vital Signs stable [Clinically Stable] : Clinically stable [Continue Treatment Plan] : Continue treatment plan [No] : No [0] : 0 out of 10

## 2020-02-26 NOTE — ADDENDUM
[FreeTextEntry1] : Pt vital signs within parameters.\par Pt showed up at [id number] thinking he had assessment. Pt informed that he was to go into chamber today and offered to go into chamber due to opening in next round. Pt declined and stated that it was too late already and his transport was coming at 1030. Offered Pt to have technician call and push back transport. Pt refused. \par Pt received wound care and sent to get xray.

## 2020-02-26 NOTE — PROCEDURE
[Wheelchair] : wheelchair [Outpatient] : Outpatient [Patient demonstrated and verbalized proper technique for using air break mask] : Patient demonstrated and verbalized proper technique for using air break mask [Patient educated on the risks of SMOKING prior to HBOT with understanding] : Patient educated on the risks of SMOKING prior to HBOT with understanding [Patient educated on the risks of CONSUMING ALCOHOL prior to HBOT with understanding] : Patient educated on the risks of CONSUMING ALCOHOL prior to HBOT with understanding [No hair oils, wigs, hairpieces, pins] : no hair oils, wigs, hairpieces, pins  [100% Cotton] : 100% cotton [Empty all pockets] : empty all pockets [No make-up, creams] : no make-up, creams  [Pre tx medications] : pre tx medications  [No jewelry] : no jewelry  [No matches, cigarettes, lighters] : no matches, cigarettes, lighters  [Hearing aid removed] : hearing aid removed [Ground bracelet on pt's wrist] : ground bracelet on pt's wrist  [Dentures removed] : dentures removed [Contacts removed] : contacts removed  [Remove nail polish] : remove nail polish  [No reading material] : no reading material  [No contraindicated dressings] : no contraindicated dressings [Bra, undergarments removed] : bra, undergarments removed  [Ground Wire - VISUAL Verification - Intact/Free of Obstruction] : Ground Wire - VISUAL Verification - Intact/Free of Obstruction  [Ground Continuity - Verified < 1 ohm w/ Wrist Strap Barb] : Ground Continuity - Verified < 1 ohm w/ Wrist Strap Barb [Number: ___] : Number: [unfilled] [Diagnosis: ___] : Diagnosis: [unfilled] [Clear all fields] : clear all fields [____] : Post-Dive: Time - [unfilled] [___] : Post-Dive: Value - [unfilled] mg/dL [] : No [FreeTextEntry3] : 90 [FreeTextEntry5] : 9888 [FreeTextEntry7] : 5000 [FreeTextEntry9] : 3903 [de-identified] : 1027 [de-identified] : 108 MIN

## 2020-02-26 NOTE — ADDENDUM
[FreeTextEntry1] : PT ARRIVED VIA WHEEL CHAIR A&OX3\par ALL VITALS WITHIN PARAMETERS FOR HBOT\par DRAINAGE NOTED TO BILATERAL HEELS ASSESSED BY NURSE PRIOR TO DESCENT.\par TRANSFER OF CARE TO T .\par Pt received wound care prior to HBO tx. \par Pt descended to 2.0 RIVAS @ 1.75 PSI/min without incident. \par Pt resting @ depth with chest rise and fall observed throughout tx. \par Pt ascended from 2.0 RIVAS @ 1.75 PSI/min without incident. \par Pt tolerated tx well.

## 2020-02-27 ENCOUNTER — OUTPATIENT (OUTPATIENT)
Dept: OUTPATIENT SERVICES | Facility: HOSPITAL | Age: 45
LOS: 1 days | Discharge: HOME | End: 2020-02-27
Payer: COMMERCIAL

## 2020-02-27 ENCOUNTER — APPOINTMENT (OUTPATIENT)
Dept: HYPERBARIC MEDICINE | Facility: HOSPITAL | Age: 45
End: 2020-02-27
Payer: COMMERCIAL

## 2020-02-27 VITALS
DIASTOLIC BLOOD PRESSURE: 60 MMHG | TEMPERATURE: 97 F | OXYGEN SATURATION: 100 % | RESPIRATION RATE: 18 BRPM | HEART RATE: 72 BPM | SYSTOLIC BLOOD PRESSURE: 101 MMHG

## 2020-02-27 VITALS
OXYGEN SATURATION: 100 % | HEART RATE: 79 BPM | RESPIRATION RATE: 18 BRPM | SYSTOLIC BLOOD PRESSURE: 146 MMHG | TEMPERATURE: 97 F | DIASTOLIC BLOOD PRESSURE: 83 MMHG

## 2020-02-27 DIAGNOSIS — E11.621 TYPE 2 DIABETES MELLITUS WITH FOOT ULCER: ICD-10-CM

## 2020-02-27 DIAGNOSIS — I77.0 ARTERIOVENOUS FISTULA, ACQUIRED: Chronic | ICD-10-CM

## 2020-02-27 PROCEDURE — 82962 GLUCOSE BLOOD TEST: CPT

## 2020-02-27 PROCEDURE — G0277: CPT

## 2020-02-27 PROCEDURE — 99183 HYPERBARIC OXYGEN THERAPY: CPT

## 2020-02-27 NOTE — PHYSICAL EXAM
[4 x 4] : 4 x 4  [Abdominal Pad] : Abdominal Pad [Normal Breath Sounds] : Normal breath sounds [0] : left 0 [1+] : left 1+ [Purpura] : purpura [Ankle Swelling On The Right] : mild [Varicose Veins Of Lower Extremities] : bilaterally [Petechiae] : petechiae [Skin Ulcer] : ulcer [Skin Induration] : induration [Alert] : alert [Oriented to Person] : oriented to person [Oriented to Place] : oriented to place [Calm] : calm [de-identified] : comfortable  [de-identified] : WNL [de-identified] : hammer toe [de-identified] : DFU 3 plantar left heel , clean granular , post debridement  [de-identified] : Diabetic neuropathy  [de-identified] : post debridement measurements: 2.0 x 1.2 x 0.2-0.4 [de-identified] : Zeina  [TWNoteComboBox7] : Isabela [de-identified] : Other [de-identified] : 3x Weekly [de-identified] : Primary Dressing [FreeTextEntry1] : Posterior Heel  [FreeTextEntry2] : 1.8 [FreeTextEntry3] : 1.0 [FreeTextEntry4] : 0.1-0.3 [de-identified] : Cleansed with Normal saline\par  [de-identified] : callus [TWNoteComboBox1] : Left [TWNoteComboBox5] : No [TWNoteComboBox4] : Small [TWNoteComboBox6] : Other [de-identified] : No [de-identified] : other [de-identified] : None [de-identified] : None [de-identified] : 100% [de-identified] : Ace wraps [de-identified] : No

## 2020-02-27 NOTE — ASSESSMENT
[No change from previous assessment] : No change from previous assessment [No ear problems] : No ear problems [No dizziness or thirst] :  No dizziness or thirst [Vital signs stable] : Vital signs stable [Tolerating dive well] : Tolerating dive well [Respiratory Rate Stable] : Respiratory Rate Stable [No Chest Pain, shortness of breath] : No Chest Pain, shortness of breath [Tolerated Ascent well] : Tolerated Ascent well [No chest pain, shortness of breath, or ear pain] :  No chest pain, shortness of breath, or ear pain  [Vital Signs stable] : Vital Signs stable [A physician was present throughout the entire HBOT] : A physician was present throughout the entire HBOT [No] : No [Clinically Stable] : Clinically stable [Continue Treatment Plan] : Continue treatment plan [0] : 0 out of 10

## 2020-02-27 NOTE — ASSESSMENT
[Verbal] : Verbal [Patient] : Patient [Fair - mild discomfort, physical impairment, low acceptance] : Fair - mild discomfort, physical impairment, low acceptance [Verbalizes knowledge/Understanding] : Verbalizes knowledge/understanding [Dressing changes] : dressing changes [Foot Care] : foot care [Pressure relief] : pressure relief [Signs and symptoms of infection] : sign and symptoms of infection [How and When to Call] : how and when to call [Hyperbaric Therapy] : hyperbaric therapy [Off-loading] : off-loading [Compression Therapy] : compression therapy [Patient responsibility to plan of care] : patient responsibility to plan of care [Home] : Home [Stable] : stable [Not Applicable - Long Term Care/Home Health Agency] : Long Term Care/Home Health Agency: Not Applicable [Wheelchair] : Wheelchair [] : No [FreeTextEntry2] : Infection Prevention\par Offloading/Pressure relief\par Edema Control\par HBOT\par F/U  [FreeTextEntry4] : Patient has completed 52/60 HBOT to date.\par F/U

## 2020-02-27 NOTE — PROCEDURE
[Outpatient] : Outpatient [Wheelchair] : wheelchair [THIS CHAMBER HAS BEEN CLEANED / DISINFECTED] : This chamber has been cleaned / disinfected according to local and hospital policy and procedure prior to this treatment. [Patient demonstrated and verbalized proper technique for using air break mask] : Patient demonstrated and verbalized proper technique for using air break mask [Patient educated on the risks of SMOKING prior to HBOT with understanding] : Patient educated on the risks of SMOKING prior to HBOT with understanding [Patient educated on the risks of CONSUMING ALCOHOL prior to HBOT with understanding] : Patient educated on the risks of CONSUMING ALCOHOL prior to HBOT with understanding [100% Cotton] : 100% cotton [Empty all pockets] : empty all pockets [No hair oils, wigs, hairpieces, pins] : no hair oils, wigs, hairpieces, pins  [Pre tx medications] : pre tx medications  [No make-up, creams] : no make-up, creams  [No jewelry] : no jewelry  [No matches, cigarettes, lighters] : no matches, cigarettes, lighters  [Hearing aid removed] : hearing aid removed [Ground bracelet on pt's wrist] : ground bracelet on pt's wrist  [Dentures removed] : dentures removed [Contacts removed] : contacts removed  [No reading material] : no reading material  [Remove nail polish] : remove nail polish  [No contraindicated dressings] : no contraindicated dressings [Bra, undergarments removed] : bra, undergarments removed  [Ground Wire - VISUAL Verification - Intact/Free of Obstruction] : Ground Wire - VISUAL Verification - Intact/Free of Obstruction  [Ground Continuity - Verified < 1 ohm w/ Wrist Strap Barb] : Ground Continuity - Verified < 1 ohm w/ Wrist Strap Barb [Number: ___] : Number: [unfilled] [Diagnosis: ___] : Diagnosis: [unfilled] [____] : Post-Dive: Time - [unfilled] [___] : Post-Dive: Value - [unfilled] mg/dL [Clear all fields] : clear all fields [] : No [FreeTextEntry5] : 6329 [FreeTextEntry3] : 90 [FreeTextEntry7] : 7469 [FreeTextEntry9] : 5541 [de-identified] : 108 min [de-identified] : 0913

## 2020-02-27 NOTE — ASSESSMENT
[Verbal] : Verbal [Patient] : Patient [Fair - mild discomfort, physical impairment, low acceptance] : Fair - mild discomfort, physical impairment, low acceptance [Verbalizes knowledge/Understanding] : Verbalizes knowledge/understanding [Dressing changes] : dressing changes [Foot Care] : foot care [Pressure relief] : pressure relief [Signs and symptoms of infection] : sign and symptoms of infection [How and When to Call] : how and when to call [Off-loading] : off-loading [Hyperbaric Therapy] : hyperbaric therapy [Compression Therapy] : compression therapy [Patient responsibility to plan of care] : patient responsibility to plan of care [Home] : Home [Stable] : stable [Not Applicable - Long Term Care/Home Health Agency] : Long Term Care/Home Health Agency: Not Applicable [Wheelchair] : Wheelchair [] : No [FreeTextEntry2] : Infection Prevention\par Offloading/Pressure relief\par Edema Control\par HBOT\par F/U  [FreeTextEntry4] : Patient has completed 52/60 HBOT to date.\par F/U

## 2020-02-27 NOTE — ADDENDUM
[FreeTextEntry1] : Drainage cleared to be changed post HBO tx. \par Pt descended to 2.0 RIVAS @ 1.75 PSI/min without incident in chamber #1. \par Pt resting @ depth with chest rise and fall observed throughout tx. \par Pt ascended from 2.0 RIVAS @ 1.75 PSI/min without incident. \par Pt tolerated tx well. Pt received wound care post HBO tx. \par

## 2020-02-28 ENCOUNTER — APPOINTMENT (OUTPATIENT)
Dept: HYPERBARIC MEDICINE | Facility: HOSPITAL | Age: 45
End: 2020-02-28

## 2020-02-28 DIAGNOSIS — E10.621 TYPE 1 DIABETES MELLITUS WITH FOOT ULCER: ICD-10-CM

## 2020-02-28 DIAGNOSIS — L97.422 NON-PRESSURE CHRONIC ULCER OF LEFT HEEL AND MIDFOOT WITH FAT LAYER EXPOSED: ICD-10-CM

## 2020-02-28 DIAGNOSIS — Z79.4 LONG TERM (CURRENT) USE OF INSULIN: ICD-10-CM

## 2020-03-02 ENCOUNTER — APPOINTMENT (OUTPATIENT)
Dept: HYPERBARIC MEDICINE | Facility: HOSPITAL | Age: 45
End: 2020-03-02
Payer: COMMERCIAL

## 2020-03-02 ENCOUNTER — OUTPATIENT (OUTPATIENT)
Dept: OUTPATIENT SERVICES | Facility: HOSPITAL | Age: 45
LOS: 1 days | Discharge: HOME | End: 2020-03-02
Payer: COMMERCIAL

## 2020-03-02 VITALS
DIASTOLIC BLOOD PRESSURE: 82 MMHG | OXYGEN SATURATION: 100 % | SYSTOLIC BLOOD PRESSURE: 148 MMHG | HEART RATE: 75 BPM | TEMPERATURE: 97.7 F | RESPIRATION RATE: 20 BRPM

## 2020-03-02 VITALS
SYSTOLIC BLOOD PRESSURE: 154 MMHG | OXYGEN SATURATION: 100 % | DIASTOLIC BLOOD PRESSURE: 83 MMHG | HEART RATE: 79 BPM | TEMPERATURE: 96.9 F | RESPIRATION RATE: 20 BRPM

## 2020-03-02 DIAGNOSIS — E11.621 TYPE 2 DIABETES MELLITUS WITH FOOT ULCER: ICD-10-CM

## 2020-03-02 DIAGNOSIS — I77.0 ARTERIOVENOUS FISTULA, ACQUIRED: Chronic | ICD-10-CM

## 2020-03-02 PROCEDURE — 82962 GLUCOSE BLOOD TEST: CPT

## 2020-03-02 PROCEDURE — 99183 HYPERBARIC OXYGEN THERAPY: CPT

## 2020-03-02 PROCEDURE — G0277: CPT

## 2020-03-02 NOTE — ASSESSMENT
[No change from previous assessment] : No change from previous assessment [No ear problems] : No ear problems [No dizziness or thirst] :  No dizziness or thirst [Tolerating dive well] : Tolerating dive well [Vital signs stable] : Vital signs stable [No Chest Pain, shortness of breath] : No Chest Pain, shortness of breath [Respiratory Rate Stable] : Respiratory Rate Stable [No chest pain, shortness of breath, or ear pain] :  No chest pain, shortness of breath, or ear pain  [Tolerated Ascent well] : Tolerated Ascent well [A physician was present throughout the entire HBOT] : A physician was present throughout the entire HBOT [Vital Signs stable] : Vital Signs stable [No] : No [Clinically Stable] : Clinically stable [Continue Treatment Plan] : Continue treatment plan [0] : 0 out of 10

## 2020-03-02 NOTE — ADDENDUM
[FreeTextEntry1] : Drainage cleared to be changed post HBO tx. Pt's feet offloaded using wedge. \par Pt descended to 2.0 RIVAS @ 1.75 PSI/min without incident in chamber #1. \par Pt resting @ depth with chest rise and fall observed throughout tx. \par Pt ascended from 2.0 RIVAS @ 1.75 PSI/min without incident. \par Pt tolerated tx well. Pt received wound care post HBO tx.

## 2020-03-02 NOTE — PROCEDURE
[Outpatient] : Outpatient [Ambulatory] : Patient is ambulatory. [THIS CHAMBER HAS BEEN CLEANED / DISINFECTED] : This chamber has been cleaned / disinfected according to local and hospital policy and procedure prior to this treatment. [Patient demonstrated and verbalized proper technique for using air break mask] : Patient demonstrated and verbalized proper technique for using air break mask [Patient educated on the risks of SMOKING prior to HBOT with understanding] : Patient educated on the risks of SMOKING prior to HBOT with understanding [Patient educated on the risks of CONSUMING ALCOHOL prior to HBOT with understanding] : Patient educated on the risks of CONSUMING ALCOHOL prior to HBOT with understanding [Empty all pockets] : empty all pockets [100% Cotton] : 100% cotton [No hair oils, wigs, hairpieces, pins] : no hair oils, wigs, hairpieces, pins  [Pre tx medications] : pre tx medications  [No jewelry] : no jewelry  [No matches, cigarettes, lighters] : no matches, cigarettes, lighters  [No make-up, creams] : no make-up, creams  [Hearing aid removed] : hearing aid removed [Dentures removed] : dentures removed [Contacts removed] : contacts removed  [Remove nail polish] : remove nail polish  [Bra, undergarments removed] : bra, undergarments removed  [No contraindicated dressings] : no contraindicated dressings [No reading material] : no reading material  [Ground Wire - VISUAL Verification - Intact/Free of Obstruction] : Ground Wire - VISUAL Verification - Intact/Free of Obstruction  [Ground Continuity - Verified < 1 ohm w/ Wrist Strap Barb] : Ground Continuity - Verified < 1 ohm w/ Wrist Strap Barb [Clear all fields] : clear all fields [Diagnosis: ___] : Diagnosis: [unfilled] [Number: ___] : Number: [unfilled] [____] : Post-Dive: Time - [unfilled] [___] : Post-Dive: Value - [unfilled] mg/dL [] : No [FreeTextEntry3] : 90 [FreeTextEntry5] : 9153 [FreeTextEntry7] : 4101 [FreeTextEntry9] : 0460 [de-identified] : 1002 [de-identified] : 108 min

## 2020-03-03 ENCOUNTER — APPOINTMENT (OUTPATIENT)
Dept: HYPERBARIC MEDICINE | Facility: HOSPITAL | Age: 45
End: 2020-03-03
Payer: COMMERCIAL

## 2020-03-03 ENCOUNTER — OUTPATIENT (OUTPATIENT)
Dept: OUTPATIENT SERVICES | Facility: HOSPITAL | Age: 45
LOS: 1 days | Discharge: HOME | End: 2020-03-03
Payer: COMMERCIAL

## 2020-03-03 VITALS
HEART RATE: 72 BPM | SYSTOLIC BLOOD PRESSURE: 114 MMHG | HEIGHT: 67 IN | RESPIRATION RATE: 18 BRPM | DIASTOLIC BLOOD PRESSURE: 62 MMHG | BODY MASS INDEX: 27.94 KG/M2 | WEIGHT: 178 LBS | TEMPERATURE: 97.1 F | OXYGEN SATURATION: 100 %

## 2020-03-03 VITALS
TEMPERATURE: 97.6 F | DIASTOLIC BLOOD PRESSURE: 76 MMHG | RESPIRATION RATE: 18 BRPM | HEART RATE: 76 BPM | SYSTOLIC BLOOD PRESSURE: 133 MMHG | OXYGEN SATURATION: 100 %

## 2020-03-03 DIAGNOSIS — L97.422 NON-PRESSURE CHRONIC ULCER OF LEFT HEEL AND MIDFOOT WITH FAT LAYER EXPOSED: ICD-10-CM

## 2020-03-03 DIAGNOSIS — E10.621 TYPE 1 DIABETES MELLITUS WITH FOOT ULCER: ICD-10-CM

## 2020-03-03 DIAGNOSIS — Z79.4 LONG TERM (CURRENT) USE OF INSULIN: ICD-10-CM

## 2020-03-03 DIAGNOSIS — I77.0 ARTERIOVENOUS FISTULA, ACQUIRED: Chronic | ICD-10-CM

## 2020-03-03 DIAGNOSIS — L97.516 NON-PRESSURE CHRONIC ULCER OF OTHER PART OF RIGHT FOOT WITH BONE INVOLVEMENT WITHOUT EVIDENCE OF NECROSIS: ICD-10-CM

## 2020-03-03 DIAGNOSIS — E11.621 TYPE 2 DIABETES MELLITUS WITH FOOT ULCER: ICD-10-CM

## 2020-03-03 PROCEDURE — 82962 GLUCOSE BLOOD TEST: CPT

## 2020-03-03 PROCEDURE — 99183 HYPERBARIC OXYGEN THERAPY: CPT

## 2020-03-03 PROCEDURE — G0277: CPT

## 2020-03-03 NOTE — ASSESSMENT
[No change from previous assessment] : No change from previous assessment [No dizziness or thirst] :  No dizziness or thirst [Vital signs stable] : Vital signs stable [No ear problems] : No ear problems [Tolerating dive well] : Tolerating dive well [No Chest Pain, shortness of breath] : No Chest Pain, shortness of breath [Respiratory Rate Stable] : Respiratory Rate Stable [Vital Signs stable] : Vital Signs stable [Tolerated Ascent well] : Tolerated Ascent well [No chest pain, shortness of breath, or ear pain] :  No chest pain, shortness of breath, or ear pain  [A physician was present throughout the entire HBOT] : A physician was present throughout the entire HBOT [No] : No [Continue Treatment Plan] : Continue treatment plan [Clinically Stable] : Clinically stable [0] : 0 out of 10

## 2020-03-04 ENCOUNTER — APPOINTMENT (OUTPATIENT)
Dept: HYPERBARIC MEDICINE | Facility: HOSPITAL | Age: 45
End: 2020-03-04
Payer: COMMERCIAL

## 2020-03-04 ENCOUNTER — OUTPATIENT (OUTPATIENT)
Dept: OUTPATIENT SERVICES | Facility: HOSPITAL | Age: 45
LOS: 1 days | Discharge: HOME | End: 2020-03-04
Payer: COMMERCIAL

## 2020-03-04 VITALS
RESPIRATION RATE: 20 BRPM | TEMPERATURE: 97.2 F | SYSTOLIC BLOOD PRESSURE: 137 MMHG | DIASTOLIC BLOOD PRESSURE: 74 MMHG | HEART RATE: 80 BPM | OXYGEN SATURATION: 100 %

## 2020-03-04 VITALS
RESPIRATION RATE: 20 BRPM | HEART RATE: 77 BPM | SYSTOLIC BLOOD PRESSURE: 152 MMHG | TEMPERATURE: 97.5 F | DIASTOLIC BLOOD PRESSURE: 78 MMHG | OXYGEN SATURATION: 100 %

## 2020-03-04 DIAGNOSIS — I77.0 ARTERIOVENOUS FISTULA, ACQUIRED: Chronic | ICD-10-CM

## 2020-03-04 DIAGNOSIS — L97.422 NON-PRESSURE CHRONIC ULCER OF LEFT HEEL AND MIDFOOT WITH FAT LAYER EXPOSED: ICD-10-CM

## 2020-03-04 DIAGNOSIS — Z79.4 LONG TERM (CURRENT) USE OF INSULIN: ICD-10-CM

## 2020-03-04 DIAGNOSIS — E10.621 TYPE 1 DIABETES MELLITUS WITH FOOT ULCER: ICD-10-CM

## 2020-03-04 DIAGNOSIS — E11.621 TYPE 2 DIABETES MELLITUS WITH FOOT ULCER: ICD-10-CM

## 2020-03-04 PROCEDURE — G0277: CPT

## 2020-03-04 PROCEDURE — 82962 GLUCOSE BLOOD TEST: CPT

## 2020-03-04 PROCEDURE — 99183 HYPERBARIC OXYGEN THERAPY: CPT

## 2020-03-04 NOTE — PROCEDURE
[Outpatient] : Outpatient [Wheelchair] : wheelchair [THIS CHAMBER HAS BEEN CLEANED / DISINFECTED] : This chamber has been cleaned / disinfected according to local and hospital policy and procedure prior to this treatment. [Patient demonstrated and verbalized proper technique for using air break mask] : Patient demonstrated and verbalized proper technique for using air break mask [Patient educated on the risks of SMOKING prior to HBOT with understanding] : Patient educated on the risks of SMOKING prior to HBOT with understanding [Patient educated on the risks of CONSUMING ALCOHOL prior to HBOT with understanding] : Patient educated on the risks of CONSUMING ALCOHOL prior to HBOT with understanding [100% Cotton] : 100% cotton [No hair oils, wigs, hairpieces, pins] : no hair oils, wigs, hairpieces, pins  [Empty all pockets] : empty all pockets [Pre tx medications] : pre tx medications  [No jewelry] : no jewelry  [No make-up, creams] : no make-up, creams  [No matches, cigarettes, lighters] : no matches, cigarettes, lighters  [Hearing aid removed] : hearing aid removed [Dentures removed] : dentures removed [Contacts removed] : contacts removed  [Ground bracelet on pt's wrist] : ground bracelet on pt's wrist  [Remove nail polish] : remove nail polish  [No reading material] : no reading material  [Bra, undergarments removed] : bra, undergarments removed  [Ground Continuity - Verified < 1 ohm w/ Wrist Strap Barb] : Ground Continuity - Verified < 1 ohm w/ Wrist Strap Barb [No contraindicated dressings] : no contraindicated dressings [Ground Wire - VISUAL Verification - Intact/Free of Obstruction] : Ground Wire - VISUAL Verification - Intact/Free of Obstruction  [Number: ___] : Number: [unfilled] [Diagnosis: ___] : Diagnosis: [unfilled] [____] : Post-Dive: Time - [unfilled] [___] : Post-Dive: Value - [unfilled] mg/dL [Clear all fields] : clear all fields [FreeTextEntry3] : 90 [] : No [FreeTextEntry5] : 9473 [FreeTextEntry7] : 2873 [de-identified] : 108 min [de-identified] : 1050 [FreeTextEntry9] : 6172

## 2020-03-04 NOTE — ADDENDUM
[FreeTextEntry1] : Drainage cleared by RN to be changed post HBO tx. \par Pt descended to 2.0 RIVAS @ 1.75 PSI/min without incident in chamber #1. \par Pt resting @ depth with chest rise and fall observed throughout tx. \par Pt ascended from 2.0 RIVAS @ 1.75 PSI/min without incident. \par Pt tolerated tx well. Pt received wound care on left foot post HBO tx.

## 2020-03-04 NOTE — PROCEDURE
[Outpatient] : Outpatient [Wheelchair] : wheelchair [THIS CHAMBER HAS BEEN CLEANED / DISINFECTED] : This chamber has been cleaned / disinfected according to local and hospital policy and procedure prior to this treatment. [Patient demonstrated and verbalized proper technique for using air break mask] : Patient demonstrated and verbalized proper technique for using air break mask [Patient educated on the risks of SMOKING prior to HBOT with understanding] : Patient educated on the risks of SMOKING prior to HBOT with understanding [Patient educated on the risks of CONSUMING ALCOHOL prior to HBOT with understanding] : Patient educated on the risks of CONSUMING ALCOHOL prior to HBOT with understanding [100% Cotton] : 100% cotton [Empty all pockets] : empty all pockets [No hair oils, wigs, hairpieces, pins] : no hair oils, wigs, hairpieces, pins  [Pre tx medications] : pre tx medications  [No make-up, creams] : no make-up, creams  [No jewelry] : no jewelry  [No matches, cigarettes, lighters] : no matches, cigarettes, lighters  [Hearing aid removed] : hearing aid removed [Dentures removed] : dentures removed [Ground bracelet on pt's wrist] : ground bracelet on pt's wrist  [No reading material] : no reading material  [Contacts removed] : contacts removed  [Remove nail polish] : remove nail polish  [Bra, undergarments removed] : bra, undergarments removed  [No contraindicated dressings] : no contraindicated dressings [Ground Wire - VISUAL Verification - Intact/Free of Obstruction] : Ground Wire - VISUAL Verification - Intact/Free of Obstruction  [Ground Continuity - Verified < 1 ohm w/ Wrist Strap Barb] : Ground Continuity - Verified < 1 ohm w/ Wrist Strap Barb [Number: ___] : Number: [unfilled] [Diagnosis: ___] : Diagnosis: [unfilled] [Clear all fields] : clear all fields [____] : Post-Dive: Time - [unfilled] [___] : Post-Dive: Value - [unfilled] mg/dL [] : No [FreeTextEntry3] : 90 [FreeTextEntry5] : 8:19 [FreeTextEntry7] : 8:28 [FreeTextEntry9] : 9:58 [de-identified] : 10:07 [de-identified] : 108

## 2020-03-04 NOTE — ADDENDUM
[FreeTextEntry1] : PT ARRIVED VIA WHEEL CHAIR FROM [facility] A[facility]\par ALL VITALS WITHIN PARAMETERS FOR HBOT.\par DRAINAGE ASSESSED PRIOR TO DESCENT. WOUND CARE TO FOLLOW HBOT.\par PT DESCENT TO RX TX WAS WITHOUT INCIDENT. PT RESTING AT DEPTH. CHEST RISE AND FALL OBSERVED\par PT ASCENT WAS WITHOUT INCIDENT. PT TOLERATED HBOT WELL.\par \par CHT MO GUERRERO 03/04/20 10:10

## 2020-03-04 NOTE — ASSESSMENT
[No dizziness or thirst] :  No dizziness or thirst [No change from previous assessment] : No change from previous assessment [No ear problems] : No ear problems [Vital signs stable] : Vital signs stable [Tolerating dive well] : Tolerating dive well [No Chest Pain, shortness of breath] : No Chest Pain, shortness of breath [Respiratory Rate Stable] : Respiratory Rate Stable [Tolerated Ascent well] : Tolerated Ascent well [No chest pain, shortness of breath, or ear pain] :  No chest pain, shortness of breath, or ear pain  [Vital Signs stable] : Vital Signs stable [A physician was present throughout the entire HBOT] : A physician was present throughout the entire HBOT [No] : No [Continue Treatment Plan] : Continue treatment plan [Clinically Stable] : Clinically stable [0] : 0 out of 10

## 2020-03-05 ENCOUNTER — OUTPATIENT (OUTPATIENT)
Dept: OUTPATIENT SERVICES | Facility: HOSPITAL | Age: 45
LOS: 1 days | Discharge: HOME | End: 2020-03-05
Payer: COMMERCIAL

## 2020-03-05 ENCOUNTER — APPOINTMENT (OUTPATIENT)
Dept: HYPERBARIC MEDICINE | Facility: HOSPITAL | Age: 45
End: 2020-03-05
Payer: COMMERCIAL

## 2020-03-05 VITALS
DIASTOLIC BLOOD PRESSURE: 75 MMHG | HEART RATE: 18 BPM | SYSTOLIC BLOOD PRESSURE: 148 MMHG | TEMPERATURE: 97.9 F | RESPIRATION RATE: 18 BRPM | OXYGEN SATURATION: 100 %

## 2020-03-05 VITALS
RESPIRATION RATE: 16 BRPM | DIASTOLIC BLOOD PRESSURE: 87 MMHG | HEART RATE: 73 BPM | TEMPERATURE: 97.9 F | OXYGEN SATURATION: 100 % | SYSTOLIC BLOOD PRESSURE: 183 MMHG

## 2020-03-05 DIAGNOSIS — I77.0 ARTERIOVENOUS FISTULA, ACQUIRED: Chronic | ICD-10-CM

## 2020-03-05 DIAGNOSIS — L97.422 NON-PRESSURE CHRONIC ULCER OF LEFT HEEL AND MIDFOOT WITH FAT LAYER EXPOSED: ICD-10-CM

## 2020-03-05 DIAGNOSIS — E11.621 TYPE 2 DIABETES MELLITUS WITH FOOT ULCER: ICD-10-CM

## 2020-03-05 DIAGNOSIS — E10.621 TYPE 1 DIABETES MELLITUS WITH FOOT ULCER: ICD-10-CM

## 2020-03-05 DIAGNOSIS — Z79.4 LONG TERM (CURRENT) USE OF INSULIN: ICD-10-CM

## 2020-03-05 PROCEDURE — 82962 GLUCOSE BLOOD TEST: CPT

## 2020-03-05 PROCEDURE — 99183 HYPERBARIC OXYGEN THERAPY: CPT

## 2020-03-05 PROCEDURE — G0277: CPT

## 2020-03-05 NOTE — ADDENDUM
[FreeTextEntry1] : Pt BGL low. RN notified. Pt given 16g of glucose via 8oz juice. Pt BGL retested and still low. RN notified. Pt given 16g of glucose. Pt refused 4oz juice and only consumed 4 oz juice. Pt BGl retested. Pt vital signs within parameters for HBO tx. \par No drainage noted on Pt's bandgage. \par Pt descended to 2.0 RIVAS @ 1.75 PSI/min without incident in chamber #1. \par Pt resting @ depth with chest rise and fall observed throughout tx. \par Pt ascended from 2.0 RIVAS@ 1.75psi/min without incident. \par Pt tolerated tx well. Pt Recieved Dressing change by RN post tx .

## 2020-03-05 NOTE — PROCEDURE
[Outpatient] : Outpatient [Ambulatory] : Patient is ambulatory. [THIS CHAMBER HAS BEEN CLEANED / DISINFECTED] : This chamber has been cleaned / disinfected according to local and hospital policy and procedure prior to this treatment. [Patient demonstrated and verbalized proper technique for using air break mask] : Patient demonstrated and verbalized proper technique for using air break mask [Patient educated on the risks of SMOKING prior to HBOT with understanding] : Patient educated on the risks of SMOKING prior to HBOT with understanding [Patient educated on the risks of CONSUMING ALCOHOL prior to HBOT with understanding] : Patient educated on the risks of CONSUMING ALCOHOL prior to HBOT with understanding [100% Cotton] : 100% cotton [Empty all pockets] : empty all pockets [No hair oils, wigs, hairpieces, pins] : no hair oils, wigs, hairpieces, pins  [Pre tx medications] : pre tx medications  [No make-up, creams] : no make-up, creams  [No jewelry] : no jewelry  [No matches, cigarettes, lighters] : no matches, cigarettes, lighters  [Hearing aid removed] : hearing aid removed [Dentures removed] : dentures removed [Ground bracelet on pt's wrist] : ground bracelet on pt's wrist  [Contacts removed] : contacts removed  [Remove nail polish] : remove nail polish  [No reading material] : no reading material  [Bra, undergarments removed] : bra, undergarments removed  [No contraindicated dressings] : no contraindicated dressings [Ground Wire - VISUAL Verification - Intact/Free of Obstruction] : Ground Wire - VISUAL Verification - Intact/Free of Obstruction  [Ground Continuity - Verified < 1 ohm w/ Wrist Strap Barb] : Ground Continuity - Verified < 1 ohm w/ Wrist Strap Barb [Number: ___] : Number: [unfilled] [Diagnosis: ___] : Diagnosis: [unfilled] [____] : Post-Dive: Time - [unfilled] [___] : Post-Dive: Value - [unfilled] mg/dL [Clear all fields] : clear all fields [] : No [FreeTextEntry1] : 2.0 [FreeTextEntry3] : 90 mins [FreeTextEntry5] : 7854 [FreeTextEntry7] : 0914 [FreeTextEntry9] : 1014 [de-identified] : 1029 [de-identified] : 108 mins

## 2020-03-06 ENCOUNTER — APPOINTMENT (OUTPATIENT)
Dept: HYPERBARIC MEDICINE | Facility: HOSPITAL | Age: 45
End: 2020-03-06

## 2020-03-09 ENCOUNTER — APPOINTMENT (OUTPATIENT)
Dept: HYPERBARIC MEDICINE | Facility: HOSPITAL | Age: 45
End: 2020-03-09
Payer: COMMERCIAL

## 2020-03-09 ENCOUNTER — OUTPATIENT (OUTPATIENT)
Dept: OUTPATIENT SERVICES | Facility: HOSPITAL | Age: 45
LOS: 1 days | Discharge: HOME | End: 2020-03-09
Payer: COMMERCIAL

## 2020-03-09 VITALS
TEMPERATURE: 98.1 F | SYSTOLIC BLOOD PRESSURE: 143 MMHG | HEART RATE: 74 BPM | DIASTOLIC BLOOD PRESSURE: 77 MMHG | OXYGEN SATURATION: 99 % | RESPIRATION RATE: 18 BRPM

## 2020-03-09 VITALS
DIASTOLIC BLOOD PRESSURE: 76 MMHG | RESPIRATION RATE: 18 BRPM | OXYGEN SATURATION: 100 % | SYSTOLIC BLOOD PRESSURE: 145 MMHG | TEMPERATURE: 97.7 F | HEART RATE: 75 BPM

## 2020-03-09 DIAGNOSIS — Z79.4 LONG TERM (CURRENT) USE OF INSULIN: ICD-10-CM

## 2020-03-09 DIAGNOSIS — E11.621 TYPE 2 DIABETES MELLITUS WITH FOOT ULCER: ICD-10-CM

## 2020-03-09 DIAGNOSIS — E10.621 TYPE 1 DIABETES MELLITUS WITH FOOT ULCER: ICD-10-CM

## 2020-03-09 DIAGNOSIS — L97.422 NON-PRESSURE CHRONIC ULCER OF LEFT HEEL AND MIDFOOT WITH FAT LAYER EXPOSED: ICD-10-CM

## 2020-03-09 DIAGNOSIS — I77.0 ARTERIOVENOUS FISTULA, ACQUIRED: Chronic | ICD-10-CM

## 2020-03-09 PROCEDURE — 99183 HYPERBARIC OXYGEN THERAPY: CPT

## 2020-03-09 PROCEDURE — G0277: CPT

## 2020-03-09 PROCEDURE — 82962 GLUCOSE BLOOD TEST: CPT

## 2020-03-09 PROCEDURE — ZZZZZ: CPT

## 2020-03-10 ENCOUNTER — OUTPATIENT (OUTPATIENT)
Dept: OUTPATIENT SERVICES | Facility: HOSPITAL | Age: 45
LOS: 1 days | Discharge: HOME | End: 2020-03-10
Payer: COMMERCIAL

## 2020-03-10 ENCOUNTER — APPOINTMENT (OUTPATIENT)
Dept: HYPERBARIC MEDICINE | Facility: HOSPITAL | Age: 45
End: 2020-03-10
Payer: COMMERCIAL

## 2020-03-10 VITALS
RESPIRATION RATE: 18 BRPM | HEART RATE: 74 BPM | DIASTOLIC BLOOD PRESSURE: 79 MMHG | OXYGEN SATURATION: 100 % | SYSTOLIC BLOOD PRESSURE: 161 MMHG | TEMPERATURE: 97.5 F

## 2020-03-10 VITALS
OXYGEN SATURATION: 100 % | HEART RATE: 72 BPM | SYSTOLIC BLOOD PRESSURE: 137 MMHG | DIASTOLIC BLOOD PRESSURE: 68 MMHG | TEMPERATURE: 97.8 F | RESPIRATION RATE: 20 BRPM

## 2020-03-10 DIAGNOSIS — I77.0 ARTERIOVENOUS FISTULA, ACQUIRED: Chronic | ICD-10-CM

## 2020-03-10 DIAGNOSIS — L97.422 NON-PRESSURE CHRONIC ULCER OF LEFT HEEL AND MIDFOOT WITH FAT LAYER EXPOSED: ICD-10-CM

## 2020-03-10 DIAGNOSIS — E11.621 TYPE 2 DIABETES MELLITUS WITH FOOT ULCER: ICD-10-CM

## 2020-03-10 DIAGNOSIS — E10.621 TYPE 1 DIABETES MELLITUS WITH FOOT ULCER: ICD-10-CM

## 2020-03-10 DIAGNOSIS — Z79.4 LONG TERM (CURRENT) USE OF INSULIN: ICD-10-CM

## 2020-03-10 PROCEDURE — 82962 GLUCOSE BLOOD TEST: CPT

## 2020-03-10 PROCEDURE — 99183 HYPERBARIC OXYGEN THERAPY: CPT

## 2020-03-10 PROCEDURE — G0277: CPT

## 2020-03-10 NOTE — PROCEDURE
[Outpatient] : Outpatient [Wheelchair] : wheelchair [THIS CHAMBER HAS BEEN CLEANED / DISINFECTED] : This chamber has been cleaned / disinfected according to local and hospital policy and procedure prior to this treatment. [Patient demonstrated and verbalized proper technique for using air break mask] : Patient demonstrated and verbalized proper technique for using air break mask [Patient educated on the risks of SMOKING prior to HBOT with understanding] : Patient educated on the risks of SMOKING prior to HBOT with understanding [Patient educated on the risks of CONSUMING ALCOHOL prior to HBOT with understanding] : Patient educated on the risks of CONSUMING ALCOHOL prior to HBOT with understanding [100% Cotton] : 100% cotton [Empty all pockets] : empty all pockets [No hair oils, wigs, hairpieces, pins] : no hair oils, wigs, hairpieces, pins  [Pre tx medications] : pre tx medications  [No make-up, creams] : no make-up, creams  [No jewelry] : no jewelry  [No matches, cigarettes, lighters] : no matches, cigarettes, lighters  [Hearing aid removed] : hearing aid removed [Dentures removed] : dentures removed [Ground bracelet on pt's wrist] : ground bracelet on pt's wrist  [Contacts removed] : contacts removed  [Remove nail polish] : remove nail polish  [No reading material] : no reading material  [Bra, undergarments removed] : bra, undergarments removed  [No contraindicated dressings] : no contraindicated dressings [Ground Wire - VISUAL Verification - Intact/Free of Obstruction] : Ground Wire - VISUAL Verification - Intact/Free of Obstruction  [Ground Continuity - Verified < 1 ohm w/ Wrist Strap Barb] : Ground Continuity - Verified < 1 ohm w/ Wrist Strap Barb [Number: ___] : Number: [unfilled] [Diagnosis: ___] : Diagnosis: [unfilled] [____] : Post-Dive: Time - [unfilled] [___] : Post-Dive: Value - [unfilled] mg/dL [Clear all fields] : clear all fields [] : No [FreeTextEntry3] : 90 [FreeTextEntry5] : 8678 [FreeTextEntry7] : 3466 [FreeTextEntry9] : 1004 [de-identified] : 1011 [de-identified] : 108 min

## 2020-03-10 NOTE — ADDENDUM
[FreeTextEntry1] : Pt's dressing reinforced by RN prior to tx due to drainage. \par Pt's heels offloaded using wedge. \par Pt descended to 2.0 RIVAS @ 1.75 PSI/min without incident in chamber #1. \par Pt resting @ depth with chest rise and fall observed throughout tx. \par Pt ascended from 2.0 RIVAS @ 1.75 PSI/min without incident. \par Pt tolerated tx well. Pt received ace wrap post HBO tx.

## 2020-03-10 NOTE — ADDENDUM
[FreeTextEntry1] : Drainage cleared to be changed post HBO tx. \par Pt's legs offloaded using wedge. \par Pt descended to 2.0 RIVAS @ 1.75 PSI/min without incident in chamber #1. \par Pt resting @ depth with chest rise and fall observed throughout tx. \par Pt ascended from 2.0 RIVAS @ 1.75 PSI/min without incident. \par Pt tolerated tx well. Pt received wound care post HBO tx.

## 2020-03-10 NOTE — PROCEDURE
[Outpatient] : Outpatient [Wheelchair] : wheelchair [THIS CHAMBER HAS BEEN CLEANED / DISINFECTED] : This chamber has been cleaned / disinfected according to local and hospital policy and procedure prior to this treatment. [Patient demonstrated and verbalized proper technique for using air break mask] : Patient demonstrated and verbalized proper technique for using air break mask [Patient educated on the risks of SMOKING prior to HBOT with understanding] : Patient educated on the risks of SMOKING prior to HBOT with understanding [Patient educated on the risks of CONSUMING ALCOHOL prior to HBOT with understanding] : Patient educated on the risks of CONSUMING ALCOHOL prior to HBOT with understanding [100% Cotton] : 100% cotton [Empty all pockets] : empty all pockets [No hair oils, wigs, hairpieces, pins] : no hair oils, wigs, hairpieces, pins  [Pre tx medications] : pre tx medications  [No make-up, creams] : no make-up, creams  [No jewelry] : no jewelry  [No matches, cigarettes, lighters] : no matches, cigarettes, lighters  [Hearing aid removed] : hearing aid removed [Dentures removed] : dentures removed [Ground bracelet on pt's wrist] : ground bracelet on pt's wrist  [Contacts removed] : contacts removed  [Remove nail polish] : remove nail polish  [No reading material] : no reading material  [Bra, undergarments removed] : bra, undergarments removed  [No contraindicated dressings] : no contraindicated dressings [Ground Wire - VISUAL Verification - Intact/Free of Obstruction] : Ground Wire - VISUAL Verification - Intact/Free of Obstruction  [Ground Continuity - Verified < 1 ohm w/ Wrist Strap Barb] : Ground Continuity - Verified < 1 ohm w/ Wrist Strap Barb [Number: ___] : Number: [unfilled] [Diagnosis: ___] : Diagnosis: [unfilled] [____] : Post-Dive: Time - [unfilled] [___] : Post-Dive: Value - [unfilled] mg/dL [Clear all fields] : clear all fields [] : No [FreeTextEntry3] : 90 [FreeTextEntry5] : 7047 [FreeTextEntry7] : 5074 [FreeTextEntry9] : 4944 [de-identified] : 1005 [de-identified] : 108 min

## 2020-03-11 ENCOUNTER — OUTPATIENT (OUTPATIENT)
Dept: OUTPATIENT SERVICES | Facility: HOSPITAL | Age: 45
LOS: 1 days | Discharge: HOME | End: 2020-03-11
Payer: COMMERCIAL

## 2020-03-11 ENCOUNTER — APPOINTMENT (OUTPATIENT)
Dept: HYPERBARIC MEDICINE | Facility: HOSPITAL | Age: 45
End: 2020-03-11
Payer: COMMERCIAL

## 2020-03-11 VITALS
HEART RATE: 75 BPM | SYSTOLIC BLOOD PRESSURE: 122 MMHG | DIASTOLIC BLOOD PRESSURE: 71 MMHG | TEMPERATURE: 98.3 F | OXYGEN SATURATION: 97 % | RESPIRATION RATE: 20 BRPM

## 2020-03-11 VITALS
HEART RATE: 72 BPM | TEMPERATURE: 97.6 F | SYSTOLIC BLOOD PRESSURE: 157 MMHG | RESPIRATION RATE: 20 BRPM | DIASTOLIC BLOOD PRESSURE: 76 MMHG | OXYGEN SATURATION: 100 %

## 2020-03-11 DIAGNOSIS — E10.621 TYPE 1 DIABETES MELLITUS WITH FOOT ULCER: ICD-10-CM

## 2020-03-11 DIAGNOSIS — L97.422 NON-PRESSURE CHRONIC ULCER OF LEFT HEEL AND MIDFOOT WITH FAT LAYER EXPOSED: ICD-10-CM

## 2020-03-11 DIAGNOSIS — E11.621 TYPE 2 DIABETES MELLITUS WITH FOOT ULCER: ICD-10-CM

## 2020-03-11 DIAGNOSIS — Z79.4 LONG TERM (CURRENT) USE OF INSULIN: ICD-10-CM

## 2020-03-11 DIAGNOSIS — I77.0 ARTERIOVENOUS FISTULA, ACQUIRED: Chronic | ICD-10-CM

## 2020-03-11 PROCEDURE — ZZZZZ: CPT

## 2020-03-11 PROCEDURE — 99183 HYPERBARIC OXYGEN THERAPY: CPT

## 2020-03-11 PROCEDURE — G0277: CPT

## 2020-03-11 PROCEDURE — 82962 GLUCOSE BLOOD TEST: CPT

## 2020-03-12 ENCOUNTER — OUTPATIENT (OUTPATIENT)
Dept: OUTPATIENT SERVICES | Facility: HOSPITAL | Age: 45
LOS: 1 days | Discharge: HOME | End: 2020-03-12
Payer: COMMERCIAL

## 2020-03-12 ENCOUNTER — APPOINTMENT (OUTPATIENT)
Dept: HYPERBARIC MEDICINE | Facility: HOSPITAL | Age: 45
End: 2020-03-12
Payer: COMMERCIAL

## 2020-03-12 VITALS
SYSTOLIC BLOOD PRESSURE: 117 MMHG | DIASTOLIC BLOOD PRESSURE: 69 MMHG | TEMPERATURE: 97.1 F | HEART RATE: 71 BPM | RESPIRATION RATE: 18 BRPM | OXYGEN SATURATION: 100 %

## 2020-03-12 VITALS
RESPIRATION RATE: 18 BRPM | SYSTOLIC BLOOD PRESSURE: 165 MMHG | TEMPERATURE: 97 F | HEART RATE: 73 BPM | DIASTOLIC BLOOD PRESSURE: 74 MMHG | OXYGEN SATURATION: 100 %

## 2020-03-12 DIAGNOSIS — E11.621 TYPE 2 DIABETES MELLITUS WITH FOOT ULCER: ICD-10-CM

## 2020-03-12 DIAGNOSIS — I77.0 ARTERIOVENOUS FISTULA, ACQUIRED: Chronic | ICD-10-CM

## 2020-03-12 DIAGNOSIS — E10.621 TYPE 1 DIABETES MELLITUS WITH FOOT ULCER: ICD-10-CM

## 2020-03-12 DIAGNOSIS — Z79.4 LONG TERM (CURRENT) USE OF INSULIN: ICD-10-CM

## 2020-03-12 DIAGNOSIS — L97.422 NON-PRESSURE CHRONIC ULCER OF LEFT HEEL AND MIDFOOT WITH FAT LAYER EXPOSED: ICD-10-CM

## 2020-03-12 PROCEDURE — 99183 HYPERBARIC OXYGEN THERAPY: CPT

## 2020-03-12 PROCEDURE — G0277: CPT

## 2020-03-12 PROCEDURE — 82962 GLUCOSE BLOOD TEST: CPT

## 2020-03-12 NOTE — ADDENDUM
[FreeTextEntry1] : No drainage noticed on pt's dressing. \par Pt descended to depth without incident in chamber # 1\par Pt is resting @ depth with chest rise and fall observed @ chamber side.\par Pt ascended from 2.0 RIVAS @ 1.75 PSI/min without incident. \par Pt tolerated tx well. \par

## 2020-03-12 NOTE — ASSESSMENT
[No change from previous assessment] : No change from previous assessment [Patient descended without problem for 9 minutes] : Patient descended without problem for 9 minutes [No dizziness or thirst] :  No dizziness or thirst [No ear problems] : No ear problems [Vital signs stable] : Vital signs stable [Tolerating dive well] : Tolerating dive well [No Chest Pain, shortness of breath] : No Chest Pain, shortness of breath [Respiratory Rate Stable] : Respiratory Rate Stable [No chest pain, shortness of breath, or ear pain] :  No chest pain, shortness of breath, or ear pain  [Tolerated Ascent well] : Tolerated Ascent well [Vital Signs stable] : Vital Signs stable [A physician was present throughout the entire HBOT] : A physician was present throughout the entire HBOT [No] : No [Clinically Stable] : Clinically stable [Continue Treatment Plan] : Continue treatment plan [0] : 0 out of 10

## 2020-03-12 NOTE — PROCEDURE
[Outpatient] : Outpatient [Wheelchair] : wheelchair [THIS CHAMBER HAS BEEN CLEANED / DISINFECTED] : This chamber has been cleaned / disinfected according to local and hospital policy and procedure prior to this treatment. [Patient demonstrated and verbalized proper technique for using air break mask] : Patient demonstrated and verbalized proper technique for using air break mask [Patient educated on the risks of SMOKING prior to HBOT with understanding] : Patient educated on the risks of SMOKING prior to HBOT with understanding [Patient educated on the risks of CONSUMING ALCOHOL prior to HBOT with understanding] : Patient educated on the risks of CONSUMING ALCOHOL prior to HBOT with understanding [100% Cotton] : 100% cotton [Empty all pockets] : empty all pockets [No hair oils, wigs, hairpieces, pins] : no hair oils, wigs, hairpieces, pins  [Pre tx medications] : pre tx medications  [No make-up, creams] : no make-up, creams  [No jewelry] : no jewelry  [No matches, cigarettes, lighters] : no matches, cigarettes, lighters  [Hearing aid removed] : hearing aid removed [Dentures removed] : dentures removed [Ground bracelet on pt's wrist] : ground bracelet on pt's wrist  [Contacts removed] : contacts removed  [Remove nail polish] : remove nail polish  [No reading material] : no reading material  [Bra, undergarments removed] : bra, undergarments removed  [No contraindicated dressings] : no contraindicated dressings [Ground Wire - VISUAL Verification - Intact/Free of Obstruction] : Ground Wire - VISUAL Verification - Intact/Free of Obstruction  [Ground Continuity - Verified < 1 ohm w/ Wrist Strap Barb] : Ground Continuity - Verified < 1 ohm w/ Wrist Strap Barb [Number: ___] : Number: [unfilled] [Diagnosis: ___] : Diagnosis: [unfilled] [____] : Post-Dive: Time - [unfilled] [___] : Post-Dive: Value - [unfilled] mg/dL [Clear all fields] : clear all fields [] : No [FreeTextEntry1] : Tx Depth  [FreeTextEntry3] : 90 [FreeTextEntry5] : 1175 [FreeTextEntry7] : 0061 [FreeTextEntry9] : 0767 [de-identified] : 0969 [de-identified] : 108 min

## 2020-03-13 ENCOUNTER — APPOINTMENT (OUTPATIENT)
Dept: HYPERBARIC MEDICINE | Facility: HOSPITAL | Age: 45
End: 2020-03-13
Payer: COMMERCIAL

## 2020-03-13 ENCOUNTER — OUTPATIENT (OUTPATIENT)
Dept: OUTPATIENT SERVICES | Facility: HOSPITAL | Age: 45
LOS: 1 days | Discharge: HOME | End: 2020-03-13
Payer: COMMERCIAL

## 2020-03-13 VITALS
BODY MASS INDEX: 28.12 KG/M2 | TEMPERATURE: 97.2 F | WEIGHT: 175 LBS | HEART RATE: 77 BPM | OXYGEN SATURATION: 99 % | DIASTOLIC BLOOD PRESSURE: 57 MMHG | RESPIRATION RATE: 18 BRPM | HEIGHT: 66 IN | SYSTOLIC BLOOD PRESSURE: 112 MMHG

## 2020-03-13 DIAGNOSIS — I77.0 ARTERIOVENOUS FISTULA, ACQUIRED: Chronic | ICD-10-CM

## 2020-03-13 DIAGNOSIS — E11.621 TYPE 2 DIABETES MELLITUS WITH FOOT ULCER: ICD-10-CM

## 2020-03-13 PROCEDURE — G0463: CPT

## 2020-03-13 PROCEDURE — 99213 OFFICE O/P EST LOW 20 MIN: CPT

## 2020-03-13 NOTE — PHYSICAL EXAM
[4 x 4] : 4 x 4  [Abdominal Pad] : Abdominal Pad [0] : left 0 [1+] : left 1+ [Varicose Veins Of Lower Extremities] : bilaterally [Ankle Swelling On The Right] : mild [No Rash or Lesion] : No rash or lesion [Skin Ulcer] : ulcer [Calm] : calm [Purpura] : no purpura  [Petechiae] : no petechiae [de-identified] : comfortable  [de-identified] : Diabetic small vessel and cardio vascular disease  [de-identified] : previous calcanectomy of the right heel , possible OM of the left heel , OM of the right pinkie finger  [de-identified] : DFU 3 plantar posterior left and right heel down to skin, subcutaneous tissue, and fat [de-identified] : Diabetic neuropathy , Dialysis  3x per week  [FreeTextEntry1] : Left Posterior Heel [FreeTextEntry2] : 2.0 [FreeTextEntry3] : 1.3 [FreeTextEntry4] : 0.4 [de-identified] : Serosanguineous [de-identified] : Callus (removed) [de-identified] : Silver Alginate  [de-identified] : Cleansed with Normal Saline\par  [FreeTextEntry7] : Right Posterior Heel [FreeTextEntry8] : 2.0 [FreeTextEntry9] : 3.5 [de-identified] : 0.7 [de-identified] : Serosanguineous [de-identified] : Callus (removed) [de-identified] : Silver Alginate [de-identified] : Cleansed with Normal Saline\par  [TWNoteComboBox4] : Moderate [TWNoteComboBox5] : No [de-identified] : No [de-identified] : other [de-identified] : None [de-identified] : None [de-identified] : 100% [de-identified] : No [de-identified] : Ace wraps [de-identified] : 3x Weekly [de-identified] : Moderate [de-identified] : No [de-identified] : No [de-identified] : other [de-identified] : None [de-identified] : None [de-identified] : 100% [de-identified] : No [de-identified] : Ace wraps [de-identified] : 3x Weekly

## 2020-03-13 NOTE — HISTORY OF PRESENT ILLNESS
[FreeTextEntry1] : Patient with multiple complications from IDDM  patient has had multiple foot surgeries and presently has DFU3  posterior left heel being treated with HBOT. Pt seen for right posterior heel dfu 2 ulceration down to skin and subcutaneous skin at this time.

## 2020-03-13 NOTE — ASSESSMENT
[Verbal] : Verbal [Demo] : Demo [Patient] : Patient [Good - alert, interested, motivated] : Good - alert, interested, motivated [Verbalizes knowledge/Understanding] : Verbalizes knowledge/understanding [Dressing changes] : dressing changes [Foot Care] : foot care [Skin Care] : skin care [Signs and symptoms of infection] : sign and symptoms of infection [How and When to Call] : how and when to call [Patient responsibility to plan of care] : patient responsibility to plan of care [Stable] : stable [Home] : Home [Wheelchair] : Wheelchair [Not Applicable - Long Term Care/Home Health Agency] : Long Term Care/Home Health Agency: Not Applicable [] : Yes [Pressure relief] : pressure relief [Off-loading] : off-loading [FreeTextEntry2] : Restore Skin Integrity\par Infection Control\par Localized wound care\par Offloading/Pressure Relief \par Discussion regarding acceptable pain tolerance of 0/10\par  [FreeTextEntry4] : Pt completed 5/30 HBOT.\par Pt to F/U to [facility] daily for HBOT & 2 Weeks for Assessment

## 2020-03-13 NOTE — REVIEW OF SYSTEMS
[Arthralgias] : arthralgias [Joint Stiffness] : joint stiffness [Skin Wound] : skin wound [Fever] : no fever [Chills] : no chills [Loss Of Hearing] : no hearing loss [Shortness Of Breath] : no shortness of breath [Abdominal Pain] : no abdominal pain [Vomiting] : no vomiting [Anxiety] : no anxiety [Easy Bleeding] : no tendency for easy bleeding [FreeTextEntry5] : diabetic small vessel diseas and cardio vascular disease  [de-identified] : DFU 3 plantar posterior left and right heel down to skin, subcutaneous tissue, and fat [de-identified] : IDDM with neuropathy  [de-identified] : IDDM , patient on Dialysis

## 2020-03-14 DIAGNOSIS — E10.29 TYPE 1 DIABETES MELLITUS WITH OTHER DIABETIC KIDNEY COMPLICATION: ICD-10-CM

## 2020-03-14 DIAGNOSIS — E10.621 TYPE 1 DIABETES MELLITUS WITH FOOT ULCER: ICD-10-CM

## 2020-03-14 DIAGNOSIS — Z98.49 CATARACT EXTRACTION STATUS, UNSPECIFIED EYE: ICD-10-CM

## 2020-03-14 DIAGNOSIS — Z79.4 LONG TERM (CURRENT) USE OF INSULIN: ICD-10-CM

## 2020-03-14 DIAGNOSIS — Z79.899 OTHER LONG TERM (CURRENT) DRUG THERAPY: ICD-10-CM

## 2020-03-14 DIAGNOSIS — Z89.422 ACQUIRED ABSENCE OF OTHER LEFT TOE(S): ICD-10-CM

## 2020-03-14 DIAGNOSIS — Z99.2 DEPENDENCE ON RENAL DIALYSIS: ICD-10-CM

## 2020-03-14 DIAGNOSIS — E10.40 TYPE 1 DIABETES MELLITUS WITH DIABETIC NEUROPATHY, UNSPECIFIED: ICD-10-CM

## 2020-03-14 DIAGNOSIS — N19 UNSPECIFIED KIDNEY FAILURE: ICD-10-CM

## 2020-03-14 DIAGNOSIS — I83.93 ASYMPTOMATIC VARICOSE VEINS OF BILATERAL LOWER EXTREMITIES: ICD-10-CM

## 2020-03-14 DIAGNOSIS — L97.422 NON-PRESSURE CHRONIC ULCER OF LEFT HEEL AND MIDFOOT WITH FAT LAYER EXPOSED: ICD-10-CM

## 2020-03-16 ENCOUNTER — APPOINTMENT (OUTPATIENT)
Dept: HYPERBARIC MEDICINE | Facility: HOSPITAL | Age: 45
End: 2020-03-16
Payer: COMMERCIAL

## 2020-03-16 ENCOUNTER — OUTPATIENT (OUTPATIENT)
Dept: OUTPATIENT SERVICES | Facility: HOSPITAL | Age: 45
LOS: 1 days | Discharge: HOME | End: 2020-03-16
Payer: COMMERCIAL

## 2020-03-16 ENCOUNTER — APPOINTMENT (OUTPATIENT)
Dept: HYPERBARIC MEDICINE | Facility: HOSPITAL | Age: 45
End: 2020-03-16

## 2020-03-16 VITALS
DIASTOLIC BLOOD PRESSURE: 84 MMHG | RESPIRATION RATE: 18 BRPM | TEMPERATURE: 97.5 F | SYSTOLIC BLOOD PRESSURE: 181 MMHG | HEART RATE: 76 BPM | OXYGEN SATURATION: 100 %

## 2020-03-16 VITALS
OXYGEN SATURATION: 99 % | TEMPERATURE: 98.4 F | HEART RATE: 72 BPM | SYSTOLIC BLOOD PRESSURE: 155 MMHG | RESPIRATION RATE: 20 BRPM | DIASTOLIC BLOOD PRESSURE: 78 MMHG

## 2020-03-16 DIAGNOSIS — E11.621 TYPE 2 DIABETES MELLITUS WITH FOOT ULCER: ICD-10-CM

## 2020-03-16 DIAGNOSIS — I77.0 ARTERIOVENOUS FISTULA, ACQUIRED: Chronic | ICD-10-CM

## 2020-03-16 PROCEDURE — 99183 HYPERBARIC OXYGEN THERAPY: CPT

## 2020-03-16 PROCEDURE — G0277: CPT

## 2020-03-17 ENCOUNTER — APPOINTMENT (OUTPATIENT)
Dept: HYPERBARIC MEDICINE | Facility: HOSPITAL | Age: 45
End: 2020-03-17

## 2020-03-17 NOTE — PROCEDURE
[] : No [FreeTextEntry3] : 90 [FreeTextEntry5] : 7056 [FreeTextEntry7] : 9948 [FreeTextEntry9] : 1007 [de-identified] : 1016 [de-identified] : 108

## 2020-03-17 NOTE — ADDENDUM
[FreeTextEntry1] : Drainage cleared by RN to be changed post HBO tx. \par Pt's feet offloaded using wedge. \par Pt started descent to 2.0 RIVAS @ 1.75 PSI/min in chamber #2. \par @ 3 PSIg, Pt complained of pain in both ears. Pt ascended to 2 PSIg. Pt coached on clearing techniques and expressed willingness to continue with descent. \par Pt descended to 2.0 RIVAS @ 1.75 PSI/min without further incident.\par Pt resting @ depth with chest rise and fall observed throughout tx. \par Pt. ascended from tx. depth to surface without incident. \par Pt. tolerated HBOT without incident. \par Pt. received WC x RN post-HBOT.

## 2020-03-18 ENCOUNTER — OUTPATIENT (OUTPATIENT)
Dept: OUTPATIENT SERVICES | Facility: HOSPITAL | Age: 45
LOS: 1 days | Discharge: HOME | End: 2020-03-18
Payer: COMMERCIAL

## 2020-03-18 ENCOUNTER — APPOINTMENT (OUTPATIENT)
Dept: HYPERBARIC MEDICINE | Facility: HOSPITAL | Age: 45
End: 2020-03-18
Payer: COMMERCIAL

## 2020-03-18 VITALS
RESPIRATION RATE: 18 BRPM | SYSTOLIC BLOOD PRESSURE: 170 MMHG | TEMPERATURE: 97.4 F | DIASTOLIC BLOOD PRESSURE: 84 MMHG | HEART RATE: 73 BPM | OXYGEN SATURATION: 100 %

## 2020-03-18 VITALS
RESPIRATION RATE: 20 BRPM | TEMPERATURE: 97.6 F | SYSTOLIC BLOOD PRESSURE: 152 MMHG | OXYGEN SATURATION: 100 % | DIASTOLIC BLOOD PRESSURE: 77 MMHG | HEART RATE: 81 BPM

## 2020-03-18 DIAGNOSIS — Z79.4 LONG TERM (CURRENT) USE OF INSULIN: ICD-10-CM

## 2020-03-18 DIAGNOSIS — I77.0 ARTERIOVENOUS FISTULA, ACQUIRED: Chronic | ICD-10-CM

## 2020-03-18 DIAGNOSIS — E11.621 TYPE 2 DIABETES MELLITUS WITH FOOT ULCER: ICD-10-CM

## 2020-03-18 DIAGNOSIS — L97.422 NON-PRESSURE CHRONIC ULCER OF LEFT HEEL AND MIDFOOT WITH FAT LAYER EXPOSED: ICD-10-CM

## 2020-03-18 DIAGNOSIS — E10.621 TYPE 1 DIABETES MELLITUS WITH FOOT ULCER: ICD-10-CM

## 2020-03-18 PROCEDURE — G0277: CPT

## 2020-03-18 PROCEDURE — 99183 HYPERBARIC OXYGEN THERAPY: CPT

## 2020-03-18 PROCEDURE — 82962 GLUCOSE BLOOD TEST: CPT

## 2020-03-18 NOTE — PROCEDURE
[Outpatient] : Outpatient [Wheelchair] : wheelchair [THIS CHAMBER HAS BEEN CLEANED / DISINFECTED] : This chamber has been cleaned / disinfected according to local and hospital policy and procedure prior to this treatment. [Patient demonstrated and verbalized proper technique for using air break mask] : Patient demonstrated and verbalized proper technique for using air break mask [Patient educated on the risks of SMOKING prior to HBOT with understanding] : Patient educated on the risks of SMOKING prior to HBOT with understanding [Patient educated on the risks of CONSUMING ALCOHOL prior to HBOT with understanding] : Patient educated on the risks of CONSUMING ALCOHOL prior to HBOT with understanding [100% Cotton] : 100% cotton [Empty all pockets] : empty all pockets [No hair oils, wigs, hairpieces, pins] : no hair oils, wigs, hairpieces, pins  [Pre tx medications] : pre tx medications  [No make-up, creams] : no make-up, creams  [No jewelry] : no jewelry  [No matches, cigarettes, lighters] : no matches, cigarettes, lighters  [Hearing aid removed] : hearing aid removed [Dentures removed] : dentures removed [Ground bracelet on pt's wrist] : ground bracelet on pt's wrist  [Contacts removed] : contacts removed  [Remove nail polish] : remove nail polish  [No reading material] : no reading material  [Bra, undergarments removed] : bra, undergarments removed  [No contraindicated dressings] : no contraindicated dressings [Ground Wire - VISUAL Verification - Intact/Free of Obstruction] : Ground Wire - VISUAL Verification - Intact/Free of Obstruction  [Ground Continuity - Verified < 1 ohm w/ Wrist Strap Barb] : Ground Continuity - Verified < 1 ohm w/ Wrist Strap Barb [Number: ___] : Number: [unfilled] [Diagnosis: ___] : Diagnosis: [unfilled] [____] : Post-Dive: Time - [unfilled] [___] : Post-Dive: Value - [unfilled] mg/dL [Clear all fields] : clear all fields [] : No [FreeTextEntry1] : tx depth  [FreeTextEntry3] : 90 [FreeTextEntry5] : 4808 [FreeTextEntry7] : 9095 [FreeTextEntry9] : 1002 [de-identified] : 1012 [de-identified] : 108 min

## 2020-03-18 NOTE — ADDENDUM
[FreeTextEntry1] : Drainage cleared to be changed post HBO tx. \par Pt's feet offloaded using blankets. \par Pt descended to 2.0 RIVAS @ 1.75 PSI/min without incident in chamber #1. \par Pt resting @ depth with chest rise and fall observed throughout tx. \par Pt ascended from depth without incident. \par Pt tolerated tx well\par Pt receive ace wrap post HBOT by RN.\par

## 2020-03-19 ENCOUNTER — APPOINTMENT (OUTPATIENT)
Dept: HYPERBARIC MEDICINE | Facility: HOSPITAL | Age: 45
End: 2020-03-19

## 2020-03-20 ENCOUNTER — APPOINTMENT (OUTPATIENT)
Dept: HYPERBARIC MEDICINE | Facility: HOSPITAL | Age: 45
End: 2020-03-20
Payer: COMMERCIAL

## 2020-03-20 ENCOUNTER — OUTPATIENT (OUTPATIENT)
Dept: OUTPATIENT SERVICES | Facility: HOSPITAL | Age: 45
LOS: 1 days | Discharge: HOME | End: 2020-03-20
Payer: COMMERCIAL

## 2020-03-20 VITALS
OXYGEN SATURATION: 100 % | RESPIRATION RATE: 18 BRPM | TEMPERATURE: 97.6 F | SYSTOLIC BLOOD PRESSURE: 182 MMHG | DIASTOLIC BLOOD PRESSURE: 82 MMHG | HEART RATE: 85 BPM

## 2020-03-20 VITALS
SYSTOLIC BLOOD PRESSURE: 140 MMHG | TEMPERATURE: 97.5 F | RESPIRATION RATE: 18 BRPM | DIASTOLIC BLOOD PRESSURE: 78 MMHG | HEART RATE: 94 BPM | OXYGEN SATURATION: 100 %

## 2020-03-20 DIAGNOSIS — I77.0 ARTERIOVENOUS FISTULA, ACQUIRED: Chronic | ICD-10-CM

## 2020-03-20 DIAGNOSIS — E11.621 TYPE 2 DIABETES MELLITUS WITH FOOT ULCER: ICD-10-CM

## 2020-03-20 PROCEDURE — G0277: CPT

## 2020-03-20 PROCEDURE — 99183 HYPERBARIC OXYGEN THERAPY: CPT

## 2020-03-20 PROCEDURE — 82962 GLUCOSE BLOOD TEST: CPT

## 2020-03-20 NOTE — ADDENDUM
[FreeTextEntry1] : Prior to dive, drainage noticed on pt's dressing. RN notified. Pt clear to dive. Pt to receive wound care post HBOT.\par Pt descended to depth without incident in chamber # 2. \par Pt is resting @ depth with chest rise and fall observed @ chamber side.\par Pt ascended from depth without incident. \par Pt tolerated tx well\par Pt receive wound care post HBOT by RN.\par \par

## 2020-03-20 NOTE — PROCEDURE
[Outpatient] : Outpatient [Ambulatory] : Patient is ambulatory. [THIS CHAMBER HAS BEEN CLEANED / DISINFECTED] : This chamber has been cleaned / disinfected according to local and hospital policy and procedure prior to this treatment. [Patient demonstrated and verbalized proper technique for using air break mask] : Patient demonstrated and verbalized proper technique for using air break mask [Patient educated on the risks of SMOKING prior to HBOT with understanding] : Patient educated on the risks of SMOKING prior to HBOT with understanding [Patient educated on the risks of CONSUMING ALCOHOL prior to HBOT with understanding] : Patient educated on the risks of CONSUMING ALCOHOL prior to HBOT with understanding [100% Cotton] : 100% cotton [Empty all pockets] : empty all pockets [No hair oils, wigs, hairpieces, pins] : no hair oils, wigs, hairpieces, pins  [Pre tx medications] : pre tx medications  [No make-up, creams] : no make-up, creams  [No jewelry] : no jewelry  [No matches, cigarettes, lighters] : no matches, cigarettes, lighters  [Hearing aid removed] : hearing aid removed [Dentures removed] : dentures removed [Ground bracelet on pt's wrist] : ground bracelet on pt's wrist  [Contacts removed] : contacts removed  [Remove nail polish] : remove nail polish  [No reading material] : no reading material  [Bra, undergarments removed] : bra, undergarments removed  [No contraindicated dressings] : no contraindicated dressings [Ground Wire - VISUAL Verification - Intact/Free of Obstruction] : Ground Wire - VISUAL Verification - Intact/Free of Obstruction  [Ground Continuity - Verified < 1 ohm w/ Wrist Strap Barb] : Ground Continuity - Verified < 1 ohm w/ Wrist Strap Barb [Number: ___] : Number: [unfilled] [Diagnosis: ___] : Diagnosis: [unfilled] [____] : Post-Dive: Time - [unfilled] [___] : Post-Dive: Value - [unfilled] mg/dL [Clear all fields] : clear all fields [] : No [FreeTextEntry1] : Tx Depth  [FreeTextEntry3] : 90 [FreeTextEntry5] : 08 14 [FreeTextEntry7] : 08 23 [FreeTextEntry9] : 09 53 [de-identified] : 10 02 [de-identified] : 108 min

## 2020-03-21 DIAGNOSIS — Z79.4 LONG TERM (CURRENT) USE OF INSULIN: ICD-10-CM

## 2020-03-21 DIAGNOSIS — L97.422 NON-PRESSURE CHRONIC ULCER OF LEFT HEEL AND MIDFOOT WITH FAT LAYER EXPOSED: ICD-10-CM

## 2020-03-21 DIAGNOSIS — E10.621 TYPE 1 DIABETES MELLITUS WITH FOOT ULCER: ICD-10-CM

## 2020-03-23 ENCOUNTER — APPOINTMENT (OUTPATIENT)
Dept: HYPERBARIC MEDICINE | Facility: HOSPITAL | Age: 45
End: 2020-03-23

## 2020-03-24 ENCOUNTER — APPOINTMENT (OUTPATIENT)
Dept: HYPERBARIC MEDICINE | Facility: HOSPITAL | Age: 45
End: 2020-03-24

## 2020-03-25 ENCOUNTER — APPOINTMENT (OUTPATIENT)
Dept: HYPERBARIC MEDICINE | Facility: HOSPITAL | Age: 45
End: 2020-03-25
Payer: COMMERCIAL

## 2020-03-25 ENCOUNTER — OUTPATIENT (OUTPATIENT)
Dept: OUTPATIENT SERVICES | Facility: HOSPITAL | Age: 45
LOS: 1 days | Discharge: HOME | End: 2020-03-25
Payer: COMMERCIAL

## 2020-03-25 VITALS
TEMPERATURE: 97.1 F | SYSTOLIC BLOOD PRESSURE: 112 MMHG | RESPIRATION RATE: 18 BRPM | OXYGEN SATURATION: 100 % | HEART RATE: 79 BPM | DIASTOLIC BLOOD PRESSURE: 88 MMHG

## 2020-03-25 VITALS
SYSTOLIC BLOOD PRESSURE: 183 MMHG | HEART RATE: 76 BPM | OXYGEN SATURATION: 100 % | RESPIRATION RATE: 18 BRPM | TEMPERATURE: 97.5 F | DIASTOLIC BLOOD PRESSURE: 89 MMHG

## 2020-03-25 DIAGNOSIS — E11.621 TYPE 2 DIABETES MELLITUS WITH FOOT ULCER: ICD-10-CM

## 2020-03-25 DIAGNOSIS — E10.621 TYPE 1 DIABETES MELLITUS WITH FOOT ULCER: ICD-10-CM

## 2020-03-25 DIAGNOSIS — L97.422 NON-PRESSURE CHRONIC ULCER OF LEFT HEEL AND MIDFOOT WITH FAT LAYER EXPOSED: ICD-10-CM

## 2020-03-25 DIAGNOSIS — I77.0 ARTERIOVENOUS FISTULA, ACQUIRED: Chronic | ICD-10-CM

## 2020-03-25 DIAGNOSIS — Z79.4 LONG TERM (CURRENT) USE OF INSULIN: ICD-10-CM

## 2020-03-25 PROCEDURE — G0277: CPT

## 2020-03-25 PROCEDURE — 82962 GLUCOSE BLOOD TEST: CPT

## 2020-03-25 PROCEDURE — 99183 HYPERBARIC OXYGEN THERAPY: CPT

## 2020-03-25 NOTE — PROCEDURE
[Outpatient] : Outpatient [Wheelchair] : wheelchair [THIS CHAMBER HAS BEEN CLEANED / DISINFECTED] : This chamber has been cleaned / disinfected according to local and hospital policy and procedure prior to this treatment. [Patient demonstrated and verbalized proper technique for using air break mask] : Patient demonstrated and verbalized proper technique for using air break mask [Patient educated on the risks of SMOKING prior to HBOT with understanding] : Patient educated on the risks of SMOKING prior to HBOT with understanding [Patient educated on the risks of CONSUMING ALCOHOL prior to HBOT with understanding] : Patient educated on the risks of CONSUMING ALCOHOL prior to HBOT with understanding [100% Cotton] : 100% cotton [Empty all pockets] : empty all pockets [No hair oils, wigs, hairpieces, pins] : no hair oils, wigs, hairpieces, pins  [Pre tx medications] : pre tx medications  [No make-up, creams] : no make-up, creams  [No jewelry] : no jewelry  [No matches, cigarettes, lighters] : no matches, cigarettes, lighters  [Hearing aid removed] : hearing aid removed [Dentures removed] : dentures removed [Ground bracelet on pt's wrist] : ground bracelet on pt's wrist  [Contacts removed] : contacts removed  [Remove nail polish] : remove nail polish  [No reading material] : no reading material  [Bra, undergarments removed] : bra, undergarments removed  [No contraindicated dressings] : no contraindicated dressings [Ground Wire - VISUAL Verification - Intact/Free of Obstruction] : Ground Wire - VISUAL Verification - Intact/Free of Obstruction  [Ground Continuity - Verified < 1 ohm w/ Wrist Strap Barb] : Ground Continuity - Verified < 1 ohm w/ Wrist Strap Barb [Number: ___] : Number: [unfilled] [Diagnosis: ___] : Diagnosis: [unfilled] [____] : Post-Dive: Time - [unfilled] [___] : Post-Dive: Value - [unfilled] mg/dL [Clear all fields] : clear all fields [] : No [FreeTextEntry1] : Tx Depth  [FreeTextEntry3] : 90 [FreeTextEntry5] : 08 08 [FreeTextEntry7] : 08 17 [FreeTextEntry9] : 09 47 [de-identified] : 09 56 [de-identified] : 108 min

## 2020-03-25 NOTE — ADDENDUM
[FreeTextEntry1] : No drainage noticed on pt's dressing. Pt to receive wound care post HBOT.\par Pt descended to depth without incident in chamber # 1. \par Pt is resting @ depth with chest rise and fall observed @ chamber side.\par Pt ascended from depth without incident. \par Pt tolerated tx well\par Pt receive wound care post HBOT by LPN.\par \par

## 2020-03-25 NOTE — ASSESSMENT
[No change from previous assessment] : No change from previous assessment [Time MD/Provider assessed Patient:_______] : Time MD/Provider assessed Patient: [unfilled] [Patient prepared for dive] : Patient prepared for dive [Patient undergoing HBO treatment for __________] : Patient undergoing HBO treatment for [unfilled] [Patient descended without problem for 9 minutes] : Patient descended without problem for 9 minutes [No dizziness or thirst] :  No dizziness or thirst [No ear problems] : No ear problems [Vital signs stable] : Vital signs stable [Tolerating dive well] : Tolerating dive well [No Chest Pain, shortness of breath] : No Chest Pain, shortness of breath [Respiratory Rate Stable] : Respiratory Rate Stable [No chest pain, shortness of breath, or ear pain] :  No chest pain, shortness of breath, or ear pain  [Tolerated Ascent well] : Tolerated Ascent well [Vital Signs stable] : Vital Signs stable [A physician was present throughout the entire HBOT] : A physician was present throughout the entire HBOT [No] : No [Clinically Stable] : Clinically stable [Continue Treatment Plan] : Continue treatment plan

## 2020-03-26 ENCOUNTER — OUTPATIENT (OUTPATIENT)
Dept: OUTPATIENT SERVICES | Facility: HOSPITAL | Age: 45
LOS: 1 days | Discharge: HOME | End: 2020-03-26
Payer: COMMERCIAL

## 2020-03-26 ENCOUNTER — APPOINTMENT (OUTPATIENT)
Dept: HYPERBARIC MEDICINE | Facility: HOSPITAL | Age: 45
End: 2020-03-26
Payer: COMMERCIAL

## 2020-03-26 VITALS
DIASTOLIC BLOOD PRESSURE: 77 MMHG | RESPIRATION RATE: 18 BRPM | OXYGEN SATURATION: 100 % | HEART RATE: 76 BPM | TEMPERATURE: 97.4 F | SYSTOLIC BLOOD PRESSURE: 145 MMHG

## 2020-03-26 VITALS
HEART RATE: 73 BPM | RESPIRATION RATE: 18 BRPM | DIASTOLIC BLOOD PRESSURE: 81 MMHG | OXYGEN SATURATION: 100 % | TEMPERATURE: 97.5 F | SYSTOLIC BLOOD PRESSURE: 170 MMHG

## 2020-03-26 DIAGNOSIS — E11.621 TYPE 2 DIABETES MELLITUS WITH FOOT ULCER: ICD-10-CM

## 2020-03-26 DIAGNOSIS — E10.621 TYPE 1 DIABETES MELLITUS WITH FOOT ULCER: ICD-10-CM

## 2020-03-26 DIAGNOSIS — L97.422 NON-PRESSURE CHRONIC ULCER OF LEFT HEEL AND MIDFOOT WITH FAT LAYER EXPOSED: ICD-10-CM

## 2020-03-26 DIAGNOSIS — I77.0 ARTERIOVENOUS FISTULA, ACQUIRED: Chronic | ICD-10-CM

## 2020-03-26 DIAGNOSIS — Z79.4 LONG TERM (CURRENT) USE OF INSULIN: ICD-10-CM

## 2020-03-26 PROCEDURE — G0277: CPT

## 2020-03-26 PROCEDURE — 82962 GLUCOSE BLOOD TEST: CPT

## 2020-03-26 PROCEDURE — 99183 HYPERBARIC OXYGEN THERAPY: CPT

## 2020-03-26 NOTE — ADDENDUM
[FreeTextEntry1] : \par pt descended to 2.0 RIVAS@ 1.75psi/min without incident. \par pt resting @ depth with equal chest rise and all observed by chamberisde. \par pt ascended from 2.0 RIVAS@ 1.75psi/min without incident. \par Pt tolerated tx well. \par \par

## 2020-03-26 NOTE — PROCEDURE
[Outpatient] : Outpatient [Ambulatory] : Patient is ambulatory. [THIS CHAMBER HAS BEEN CLEANED / DISINFECTED] : This chamber has been cleaned / disinfected according to local and hospital policy and procedure prior to this treatment. [Patient demonstrated and verbalized proper technique for using air break mask] : Patient demonstrated and verbalized proper technique for using air break mask [Patient educated on the risks of SMOKING prior to HBOT with understanding] : Patient educated on the risks of SMOKING prior to HBOT with understanding [Patient educated on the risks of CONSUMING ALCOHOL prior to HBOT with understanding] : Patient educated on the risks of CONSUMING ALCOHOL prior to HBOT with understanding [100% Cotton] : 100% cotton [Empty all pockets] : empty all pockets [No hair oils, wigs, hairpieces, pins] : no hair oils, wigs, hairpieces, pins  [Pre tx medications] : pre tx medications  [No make-up, creams] : no make-up, creams  [No jewelry] : no jewelry  [No matches, cigarettes, lighters] : no matches, cigarettes, lighters  [Hearing aid removed] : hearing aid removed [Dentures removed] : dentures removed [Ground bracelet on pt's wrist] : ground bracelet on pt's wrist  [Contacts removed] : contacts removed  [Remove nail polish] : remove nail polish  [No reading material] : no reading material  [Bra, undergarments removed] : bra, undergarments removed  [No contraindicated dressings] : no contraindicated dressings [Ground Wire - VISUAL Verification - Intact/Free of Obstruction] : Ground Wire - VISUAL Verification - Intact/Free of Obstruction  [Ground Continuity - Verified < 1 ohm w/ Wrist Strap Barb] : Ground Continuity - Verified < 1 ohm w/ Wrist Strap Babr [Number: ___] : Number: [unfilled] [Diagnosis: ___] : Diagnosis: [unfilled] [____] : Post-Dive: Time - [unfilled] [___] : Post-Dive: Value - [unfilled] mg/dL [Clear all fields] : clear all fields [] : No [FreeTextEntry1] : 2.0 [FreeTextEntry3] : 90 mins [FreeTextEntry5] : 8288 [FreeTextEntry7] : 6341 [FreeTextEntry9] : 9703 [de-identified] : 0964 [de-identified] : 108 mins

## 2020-03-27 ENCOUNTER — OUTPATIENT (OUTPATIENT)
Dept: OUTPATIENT SERVICES | Facility: HOSPITAL | Age: 45
LOS: 1 days | Discharge: HOME | End: 2020-03-27
Payer: COMMERCIAL

## 2020-03-27 ENCOUNTER — APPOINTMENT (OUTPATIENT)
Dept: HYPERBARIC MEDICINE | Facility: HOSPITAL | Age: 45
End: 2020-03-27
Payer: COMMERCIAL

## 2020-03-27 VITALS
OXYGEN SATURATION: 100 % | HEART RATE: 81 BPM | HEIGHT: 67 IN | RESPIRATION RATE: 18 BRPM | SYSTOLIC BLOOD PRESSURE: 147 MMHG | WEIGHT: 176.99 LBS | BODY MASS INDEX: 27.78 KG/M2 | TEMPERATURE: 97.3 F | DIASTOLIC BLOOD PRESSURE: 81 MMHG

## 2020-03-27 DIAGNOSIS — E10.40 TYPE 1 DIABETES MELLITUS WITH DIABETIC NEUROPATHY, UNSPECIFIED: ICD-10-CM

## 2020-03-27 DIAGNOSIS — I77.0 ARTERIOVENOUS FISTULA, ACQUIRED: Chronic | ICD-10-CM

## 2020-03-27 DIAGNOSIS — E11.621 TYPE 2 DIABETES MELLITUS WITH FOOT ULCER: ICD-10-CM

## 2020-03-27 PROCEDURE — 99213 OFFICE O/P EST LOW 20 MIN: CPT

## 2020-03-27 PROCEDURE — G0463: CPT

## 2020-03-29 DIAGNOSIS — E10.40 TYPE 1 DIABETES MELLITUS WITH DIABETIC NEUROPATHY, UNSPECIFIED: ICD-10-CM

## 2020-03-29 DIAGNOSIS — E10.621 TYPE 1 DIABETES MELLITUS WITH FOOT ULCER: ICD-10-CM

## 2020-03-29 DIAGNOSIS — L84 CORNS AND CALLOSITIES: ICD-10-CM

## 2020-03-29 DIAGNOSIS — Z98.49 CATARACT EXTRACTION STATUS, UNSPECIFIED EYE: ICD-10-CM

## 2020-03-29 DIAGNOSIS — E10.29 TYPE 1 DIABETES MELLITUS WITH OTHER DIABETIC KIDNEY COMPLICATION: ICD-10-CM

## 2020-03-29 DIAGNOSIS — I83.93 ASYMPTOMATIC VARICOSE VEINS OF BILATERAL LOWER EXTREMITIES: ICD-10-CM

## 2020-03-29 DIAGNOSIS — Z79.899 OTHER LONG TERM (CURRENT) DRUG THERAPY: ICD-10-CM

## 2020-03-29 DIAGNOSIS — Z99.2 DEPENDENCE ON RENAL DIALYSIS: ICD-10-CM

## 2020-03-29 DIAGNOSIS — Z79.4 LONG TERM (CURRENT) USE OF INSULIN: ICD-10-CM

## 2020-03-29 DIAGNOSIS — N19 UNSPECIFIED KIDNEY FAILURE: ICD-10-CM

## 2020-03-29 DIAGNOSIS — L97.422 NON-PRESSURE CHRONIC ULCER OF LEFT HEEL AND MIDFOOT WITH FAT LAYER EXPOSED: ICD-10-CM

## 2020-03-29 DIAGNOSIS — Z89.422 ACQUIRED ABSENCE OF OTHER LEFT TOE(S): ICD-10-CM

## 2020-03-30 ENCOUNTER — APPOINTMENT (OUTPATIENT)
Dept: HYPERBARIC MEDICINE | Facility: HOSPITAL | Age: 45
End: 2020-03-30

## 2020-03-31 ENCOUNTER — APPOINTMENT (OUTPATIENT)
Dept: HYPERBARIC MEDICINE | Facility: HOSPITAL | Age: 45
End: 2020-03-31
Payer: COMMERCIAL

## 2020-03-31 ENCOUNTER — OUTPATIENT (OUTPATIENT)
Dept: OUTPATIENT SERVICES | Facility: HOSPITAL | Age: 45
LOS: 1 days | Discharge: HOME | End: 2020-03-31
Payer: COMMERCIAL

## 2020-03-31 VITALS
SYSTOLIC BLOOD PRESSURE: 110 MMHG | RESPIRATION RATE: 18 BRPM | OXYGEN SATURATION: 100 % | DIASTOLIC BLOOD PRESSURE: 54 MMHG | TEMPERATURE: 97.5 F | HEART RATE: 78 BPM

## 2020-03-31 VITALS
TEMPERATURE: 97.8 F | RESPIRATION RATE: 18 BRPM | SYSTOLIC BLOOD PRESSURE: 168 MMHG | DIASTOLIC BLOOD PRESSURE: 80 MMHG | HEART RATE: 76 BPM | OXYGEN SATURATION: 100 %

## 2020-03-31 DIAGNOSIS — E11.621 TYPE 2 DIABETES MELLITUS WITH FOOT ULCER: ICD-10-CM

## 2020-03-31 DIAGNOSIS — Z79.4 LONG TERM (CURRENT) USE OF INSULIN: ICD-10-CM

## 2020-03-31 DIAGNOSIS — E10.621 TYPE 1 DIABETES MELLITUS WITH FOOT ULCER: ICD-10-CM

## 2020-03-31 DIAGNOSIS — I77.0 ARTERIOVENOUS FISTULA, ACQUIRED: Chronic | ICD-10-CM

## 2020-03-31 DIAGNOSIS — L97.422 NON-PRESSURE CHRONIC ULCER OF LEFT HEEL AND MIDFOOT WITH FAT LAYER EXPOSED: ICD-10-CM

## 2020-03-31 PROCEDURE — 99183 HYPERBARIC OXYGEN THERAPY: CPT

## 2020-03-31 PROCEDURE — 82962 GLUCOSE BLOOD TEST: CPT

## 2020-03-31 PROCEDURE — G0277: CPT

## 2020-03-31 NOTE — ADDENDUM
[FreeTextEntry1] : Drainage cleared by RN to be changed post HBO tx. \par Pt's feet offloaded using wedge. \par Pt descended to 2.0 RIVAS @ 1.75 PSI/min without incident in chamber #1.\par Pt resting @ depth with chest rise and fall observed throughout tx. \par Pt ascended from 2.0 RIVAS @ 1.75 PSI/min without incident. \par Pt tolerated tx well. Pt received wound care post HBO tx.

## 2020-03-31 NOTE — PROCEDURE
[Outpatient] : Outpatient [THIS CHAMBER HAS BEEN CLEANED / DISINFECTED] : This chamber has been cleaned / disinfected according to local and hospital policy and procedure prior to this treatment. [Patient demonstrated and verbalized proper technique for using air break mask] : Patient demonstrated and verbalized proper technique for using air break mask [Patient educated on the risks of SMOKING prior to HBOT with understanding] : Patient educated on the risks of SMOKING prior to HBOT with understanding [Patient educated on the risks of CONSUMING ALCOHOL prior to HBOT with understanding] : Patient educated on the risks of CONSUMING ALCOHOL prior to HBOT with understanding [100% Cotton] : 100% cotton [Empty all pockets] : empty all pockets [No hair oils, wigs, hairpieces, pins] : no hair oils, wigs, hairpieces, pins  [Pre tx medications] : pre tx medications  [No make-up, creams] : no make-up, creams  [No jewelry] : no jewelry  [No matches, cigarettes, lighters] : no matches, cigarettes, lighters  [Hearing aid removed] : hearing aid removed [Dentures removed] : dentures removed [Ground bracelet on pt's wrist] : ground bracelet on pt's wrist  [Contacts removed] : contacts removed  [Remove nail polish] : remove nail polish  [No reading material] : no reading material  [Bra, undergarments removed] : bra, undergarments removed  [No contraindicated dressings] : no contraindicated dressings [Ground Wire - VISUAL Verification - Intact/Free of Obstruction] : Ground Wire - VISUAL Verification - Intact/Free of Obstruction  [Ground Continuity - Verified < 1 ohm w/ Wrist Strap Barb] : Ground Continuity - Verified < 1 ohm w/ Wrist Strap Barb [Number: ___] : Number: [unfilled] [Diagnosis: ___] : Diagnosis: [unfilled] [____] : Post-Dive: Time - [unfilled] [___] : Post-Dive: Value - [unfilled] mg/dL [Clear all fields] : clear all fields [] : No [FreeTextEntry3] : 90 [FreeTextEntry5] : 2474 [FreeTextEntry7] : 4001 [FreeTextEntry9] : 2870 [de-identified] : 0840 [de-identified] : 108 min

## 2020-03-31 NOTE — PROCEDURE
[Outpatient] : Outpatient [Ambulatory] : Patient is ambulatory. [Wheelchair] : wheelchair [THIS CHAMBER HAS BEEN CLEANED / DISINFECTED] : This chamber has been cleaned / disinfected according to local and hospital policy and procedure prior to this treatment. [Patient demonstrated and verbalized proper technique for using air break mask] : Patient demonstrated and verbalized proper technique for using air break mask [Patient educated on the risks of SMOKING prior to HBOT with understanding] : Patient educated on the risks of SMOKING prior to HBOT with understanding [Patient educated on the risks of CONSUMING ALCOHOL prior to HBOT with understanding] : Patient educated on the risks of CONSUMING ALCOHOL prior to HBOT with understanding [100% Cotton] : 100% cotton [Empty all pockets] : empty all pockets [No hair oils, wigs, hairpieces, pins] : no hair oils, wigs, hairpieces, pins  [Pre tx medications] : pre tx medications  [No make-up, creams] : no make-up, creams  [No jewelry] : no jewelry  [No matches, cigarettes, lighters] : no matches, cigarettes, lighters  [Hearing aid removed] : hearing aid removed [Dentures removed] : dentures removed [Ground bracelet on pt's wrist] : ground bracelet on pt's wrist  [Contacts removed] : contacts removed  [Remove nail polish] : remove nail polish  [No reading material] : no reading material  [Bra, undergarments removed] : bra, undergarments removed  [No contraindicated dressings] : no contraindicated dressings [Ground Wire - VISUAL Verification - Intact/Free of Obstruction] : Ground Wire - VISUAL Verification - Intact/Free of Obstruction  [Ground Continuity - Verified < 1 ohm w/ Wrist Strap Barb] : Ground Continuity - Verified < 1 ohm w/ Wrist Strap Barb [Number: ___] : Number: [unfilled] [Diagnosis: ___] : Diagnosis: [unfilled] [____] : Post-Dive: Time - [unfilled] [___] : Post-Dive: Value - [unfilled] mg/dL [Clear all fields] : clear all fields [] : No [FreeTextEntry1] : 2.0 [FreeTextEntry3] : 90 mins [FreeTextEntry5] : 2348 [FreeTextEntry7] : 7406 [FreeTextEntry9] : 7527 [de-identified] : 0942 [de-identified] : 108 mins

## 2020-03-31 NOTE — ADDENDUM
[FreeTextEntry1] : DRAINAGE NOTED ON PT'S DRESSING PRIOR TO DESCENT. RN NOTIFIED. PT TO RECEIVE WOUND CARE POST HBOT.\par \par PT DESCENDED TO 2.0 RIVAS @ 1.75 PSI/MIN WITHOUT INCIDENT IN CHAMBER # 1. PT'S RESTING AT TX DEPTH WITH WOUND OFFLOADED. CHEST RISE AND FALL OBSERVED CHAMBERSIDE.\par Pt ascended from 2.0 RIVAS@ 1.75psi/min without incident. \par pt tolerated tx well. Pt Recieved Wc by LPN  post tx .

## 2020-04-01 ENCOUNTER — APPOINTMENT (OUTPATIENT)
Dept: HYPERBARIC MEDICINE | Facility: HOSPITAL | Age: 45
End: 2020-04-01
Payer: COMMERCIAL

## 2020-04-01 ENCOUNTER — OUTPATIENT (OUTPATIENT)
Dept: OUTPATIENT SERVICES | Facility: HOSPITAL | Age: 45
LOS: 1 days | Discharge: HOME | End: 2020-04-01
Payer: COMMERCIAL

## 2020-04-01 VITALS
DIASTOLIC BLOOD PRESSURE: 78 MMHG | SYSTOLIC BLOOD PRESSURE: 179 MMHG | OXYGEN SATURATION: 100 % | RESPIRATION RATE: 18 BRPM | TEMPERATURE: 97.4 F | HEART RATE: 76 BPM

## 2020-04-01 VITALS
SYSTOLIC BLOOD PRESSURE: 159 MMHG | TEMPERATURE: 98.4 F | DIASTOLIC BLOOD PRESSURE: 75 MMHG | OXYGEN SATURATION: 100 % | HEART RATE: 76 BPM | RESPIRATION RATE: 18 BRPM

## 2020-04-01 DIAGNOSIS — E10.621 TYPE 1 DIABETES MELLITUS WITH FOOT ULCER: ICD-10-CM

## 2020-04-01 DIAGNOSIS — Z79.4 LONG TERM (CURRENT) USE OF INSULIN: ICD-10-CM

## 2020-04-01 DIAGNOSIS — L97.422 NON-PRESSURE CHRONIC ULCER OF LEFT HEEL AND MIDFOOT WITH FAT LAYER EXPOSED: ICD-10-CM

## 2020-04-01 DIAGNOSIS — I77.0 ARTERIOVENOUS FISTULA, ACQUIRED: Chronic | ICD-10-CM

## 2020-04-01 DIAGNOSIS — E11.621 TYPE 2 DIABETES MELLITUS WITH FOOT ULCER: ICD-10-CM

## 2020-04-01 PROCEDURE — 82962 GLUCOSE BLOOD TEST: CPT

## 2020-04-01 PROCEDURE — 99183 HYPERBARIC OXYGEN THERAPY: CPT

## 2020-04-01 PROCEDURE — G0277: CPT

## 2020-04-01 NOTE — ADDENDUM
[FreeTextEntry1] : Pt received wound care prior ot HBO tx due to drainage. \par Pt's heels offloaded using wedge. \par Pt descended to 2.0 RIVAS @ 1.75 PSI/min without incident in chamber #1. \par Pt resting @ depth with chest rise and fall observed throughout tx. \par Pt ascended from 2.0 RIVAS @ 1.75 PSI/min without incident. \par Pt tolerated tx well. Pt received wound care post HBO tx. \par

## 2020-04-01 NOTE — PROCEDURE
[Outpatient] : Outpatient [Wheelchair] : wheelchair [THIS CHAMBER HAS BEEN CLEANED / DISINFECTED] : This chamber has been cleaned / disinfected according to local and hospital policy and procedure prior to this treatment. [Patient demonstrated and verbalized proper technique for using air break mask] : Patient demonstrated and verbalized proper technique for using air break mask [Patient educated on the risks of SMOKING prior to HBOT with understanding] : Patient educated on the risks of SMOKING prior to HBOT with understanding [Patient educated on the risks of CONSUMING ALCOHOL prior to HBOT with understanding] : Patient educated on the risks of CONSUMING ALCOHOL prior to HBOT with understanding [100% Cotton] : 100% cotton [Empty all pockets] : empty all pockets [No hair oils, wigs, hairpieces, pins] : no hair oils, wigs, hairpieces, pins  [Pre tx medications] : pre tx medications  [No make-up, creams] : no make-up, creams  [No jewelry] : no jewelry  [No matches, cigarettes, lighters] : no matches, cigarettes, lighters  [Hearing aid removed] : hearing aid removed [Dentures removed] : dentures removed [Ground bracelet on pt's wrist] : ground bracelet on pt's wrist  [Contacts removed] : contacts removed  [Remove nail polish] : remove nail polish  [No reading material] : no reading material  [Bra, undergarments removed] : bra, undergarments removed  [No contraindicated dressings] : no contraindicated dressings [Ground Wire - VISUAL Verification - Intact/Free of Obstruction] : Ground Wire - VISUAL Verification - Intact/Free of Obstruction  [Ground Continuity - Verified < 1 ohm w/ Wrist Strap Barb] : Ground Continuity - Verified < 1 ohm w/ Wrist Strap Barb [Number: ___] : Number: [unfilled] [Diagnosis: ___] : Diagnosis: [unfilled] [____] : Post-Dive: Time - [unfilled] [___] : Post-Dive: Value - [unfilled] mg/dL [Clear all fields] : clear all fields [] : No [FreeTextEntry3] : 90 [FreeTextEntry5] : 5483 [FreeTextEntry7] : 9893 [FreeTextEntry9] : 9732 [de-identified] : 0917 [de-identified] : 108 min

## 2020-04-02 ENCOUNTER — APPOINTMENT (OUTPATIENT)
Dept: HYPERBARIC MEDICINE | Facility: HOSPITAL | Age: 45
End: 2020-04-02

## 2020-04-03 ENCOUNTER — APPOINTMENT (OUTPATIENT)
Dept: HYPERBARIC MEDICINE | Facility: HOSPITAL | Age: 45
End: 2020-04-03
Payer: COMMERCIAL

## 2020-04-03 ENCOUNTER — OUTPATIENT (OUTPATIENT)
Dept: OUTPATIENT SERVICES | Facility: HOSPITAL | Age: 45
LOS: 1 days | Discharge: HOME | End: 2020-04-03
Payer: COMMERCIAL

## 2020-04-03 VITALS
SYSTOLIC BLOOD PRESSURE: 134 MMHG | HEART RATE: 82 BPM | TEMPERATURE: 99.2 F | OXYGEN SATURATION: 99 % | RESPIRATION RATE: 16 BRPM | DIASTOLIC BLOOD PRESSURE: 78 MMHG

## 2020-04-03 VITALS — TEMPERATURE: 97.3 F

## 2020-04-03 VITALS
DIASTOLIC BLOOD PRESSURE: 88 MMHG | SYSTOLIC BLOOD PRESSURE: 187 MMHG | RESPIRATION RATE: 18 BRPM | OXYGEN SATURATION: 100 % | HEART RATE: 73 BPM | TEMPERATURE: 97.3 F

## 2020-04-03 DIAGNOSIS — E11.621 TYPE 2 DIABETES MELLITUS WITH FOOT ULCER: ICD-10-CM

## 2020-04-03 DIAGNOSIS — I77.0 ARTERIOVENOUS FISTULA, ACQUIRED: Chronic | ICD-10-CM

## 2020-04-03 DIAGNOSIS — Z79.4 LONG TERM (CURRENT) USE OF INSULIN: ICD-10-CM

## 2020-04-03 DIAGNOSIS — E10.621 TYPE 1 DIABETES MELLITUS WITH FOOT ULCER: ICD-10-CM

## 2020-04-03 DIAGNOSIS — L97.422 NON-PRESSURE CHRONIC ULCER OF LEFT HEEL AND MIDFOOT WITH FAT LAYER EXPOSED: ICD-10-CM

## 2020-04-03 PROCEDURE — 82962 GLUCOSE BLOOD TEST: CPT

## 2020-04-03 PROCEDURE — 99183 HYPERBARIC OXYGEN THERAPY: CPT

## 2020-04-03 PROCEDURE — G0277: CPT

## 2020-04-03 NOTE — ADDENDUM
[FreeTextEntry1] : DRAINAGE NOTED ON PT'S DRESSING PRIOR TO DESCENT. RN NOTIFIED. PT TO RECEIVE WOUND CARE POST HBOT.\par \par PT DESCENDED TO 2.0 RIVAS @ 1.75 PSI/MIN WITHOUT INCIDENT IN CHAMBER # 1. PT'S RESTING AT TX DEPTH WITH WOUNDS OFFLOADED. CHEST RISE AND FALL OBSERVED CHAMBERSIDE.\par \par PT ASCENDED FROM 2.0 RIVAS @ 1.75 PSI/MIN WITHOUT INCIDENT. PT TOLERATED TX WELL.\par

## 2020-04-03 NOTE — PROCEDURE
[Outpatient] : Outpatient [Ambulatory] : Patient is ambulatory. [Wheelchair] : wheelchair [THIS CHAMBER HAS BEEN CLEANED / DISINFECTED] : This chamber has been cleaned / disinfected according to local and hospital policy and procedure prior to this treatment. [Patient demonstrated and verbalized proper technique for using air break mask] : Patient demonstrated and verbalized proper technique for using air break mask [Patient educated on the risks of SMOKING prior to HBOT with understanding] : Patient educated on the risks of SMOKING prior to HBOT with understanding [Patient educated on the risks of CONSUMING ALCOHOL prior to HBOT with understanding] : Patient educated on the risks of CONSUMING ALCOHOL prior to HBOT with understanding [100% Cotton] : 100% cotton [Empty all pockets] : empty all pockets [No hair oils, wigs, hairpieces, pins] : no hair oils, wigs, hairpieces, pins  [Pre tx medications] : pre tx medications  [No make-up, creams] : no make-up, creams  [No jewelry] : no jewelry  [No matches, cigarettes, lighters] : no matches, cigarettes, lighters  [Hearing aid removed] : hearing aid removed [Dentures removed] : dentures removed [Ground bracelet on pt's wrist] : ground bracelet on pt's wrist  [Contacts removed] : contacts removed  [Remove nail polish] : remove nail polish  [No reading material] : no reading material  [Bra, undergarments removed] : bra, undergarments removed  [No contraindicated dressings] : no contraindicated dressings [Ground Wire - VISUAL Verification - Intact/Free of Obstruction] : Ground Wire - VISUAL Verification - Intact/Free of Obstruction  [Ground Continuity - Verified < 1 ohm w/ Wrist Strap Barb] : Ground Continuity - Verified < 1 ohm w/ Wrist Strap Barb [Number: ___] : Number: [unfilled] [Diagnosis: ___] : Diagnosis: [unfilled] [____] : Post-Dive: Time - [unfilled] [___] : Post-Dive: Value - [unfilled] mg/dL [Clear all fields] : clear all fields [] : No [FreeTextEntry3] : 90 [FreeTextEntry5] : 4184 [FreeTextEntry7] : 9952 [FreeTextEntry9] : 2616 [de-identified] : 0941 [de-identified] : 108 MIN

## 2020-04-06 ENCOUNTER — APPOINTMENT (OUTPATIENT)
Dept: HYPERBARIC MEDICINE | Facility: HOSPITAL | Age: 45
End: 2020-04-06

## 2020-04-07 ENCOUNTER — OUTPATIENT (OUTPATIENT)
Dept: OUTPATIENT SERVICES | Facility: HOSPITAL | Age: 45
LOS: 1 days | Discharge: HOME | End: 2020-04-07
Payer: COMMERCIAL

## 2020-04-07 ENCOUNTER — APPOINTMENT (OUTPATIENT)
Dept: HYPERBARIC MEDICINE | Facility: HOSPITAL | Age: 45
End: 2020-04-07
Payer: COMMERCIAL

## 2020-04-07 VITALS
TEMPERATURE: 97.7 F | SYSTOLIC BLOOD PRESSURE: 121 MMHG | HEART RATE: 76 BPM | RESPIRATION RATE: 16 BRPM | DIASTOLIC BLOOD PRESSURE: 64 MMHG | OXYGEN SATURATION: 99 %

## 2020-04-07 VITALS
SYSTOLIC BLOOD PRESSURE: 182 MMHG | HEART RATE: 76 BPM | RESPIRATION RATE: 16 BRPM | OXYGEN SATURATION: 100 % | DIASTOLIC BLOOD PRESSURE: 84 MMHG | TEMPERATURE: 97.4 F

## 2020-04-07 DIAGNOSIS — E10.621 TYPE 1 DIABETES MELLITUS WITH FOOT ULCER: ICD-10-CM

## 2020-04-07 DIAGNOSIS — L97.422 NON-PRESSURE CHRONIC ULCER OF LEFT HEEL AND MIDFOOT WITH FAT LAYER EXPOSED: ICD-10-CM

## 2020-04-07 DIAGNOSIS — I77.0 ARTERIOVENOUS FISTULA, ACQUIRED: Chronic | ICD-10-CM

## 2020-04-07 DIAGNOSIS — Z79.4 LONG TERM (CURRENT) USE OF INSULIN: ICD-10-CM

## 2020-04-07 DIAGNOSIS — E11.621 TYPE 2 DIABETES MELLITUS WITH FOOT ULCER: ICD-10-CM

## 2020-04-07 PROCEDURE — 82962 GLUCOSE BLOOD TEST: CPT

## 2020-04-07 PROCEDURE — 99183 HYPERBARIC OXYGEN THERAPY: CPT

## 2020-04-07 PROCEDURE — G0277: CPT

## 2020-04-07 NOTE — PROCEDURE
[Outpatient] : Outpatient [Ambulatory] : Patient is ambulatory. [THIS CHAMBER HAS BEEN CLEANED / DISINFECTED] : This chamber has been cleaned / disinfected according to local and hospital policy and procedure prior to this treatment. [Patient demonstrated and verbalized proper technique for using air break mask] : Patient demonstrated and verbalized proper technique for using air break mask [Patient educated on the risks of SMOKING prior to HBOT with understanding] : Patient educated on the risks of SMOKING prior to HBOT with understanding [Patient educated on the risks of CONSUMING ALCOHOL prior to HBOT with understanding] : Patient educated on the risks of CONSUMING ALCOHOL prior to HBOT with understanding [100% Cotton] : 100% cotton [Empty all pockets] : empty all pockets [No hair oils, wigs, hairpieces, pins] : no hair oils, wigs, hairpieces, pins  [Pre tx medications] : pre tx medications  [No make-up, creams] : no make-up, creams  [No jewelry] : no jewelry  [No matches, cigarettes, lighters] : no matches, cigarettes, lighters  [Hearing aid removed] : hearing aid removed [Dentures removed] : dentures removed [Ground bracelet on pt's wrist] : ground bracelet on pt's wrist  [Contacts removed] : contacts removed  [Remove nail polish] : remove nail polish  [No reading material] : no reading material  [Bra, undergarments removed] : bra, undergarments removed  [No contraindicated dressings] : no contraindicated dressings [Ground Wire - VISUAL Verification - Intact/Free of Obstruction] : Ground Wire - VISUAL Verification - Intact/Free of Obstruction  [Ground Continuity - Verified < 1 ohm w/ Wrist Strap Barb] : Ground Continuity - Verified < 1 ohm w/ Wrist Strap Barb [Diagnosis: ___] : Diagnosis: [unfilled] [____] : Post-Dive: Time - [unfilled] [___] : Post-Dive: Value - [unfilled] mg/dL [Clear all fields] : clear all fields [Number: ___] : Number: [unfilled] [] : No [FreeTextEntry1] : 2.0 [FreeTextEntry3] : 90 mins [FreeTextEntry5] : 1354 [FreeTextEntry7] : 4155 [FreeTextEntry9] : 7008 [de-identified] : 0939 [de-identified] : 108 mins

## 2020-04-07 NOTE — ADDENDUM
[FreeTextEntry1] : Pt descended to 2.0 RIVAS @ 1.75 PSI/min without incident in chamber # 1. \par Pt resting @ depth with chest rise and fall observed by chamber side. \par Pt ascended from 2.0 RIVAS @ 1.75 PSI/min without incident. \par Pt tolerated tx well. Pt received WC by RN post tx  \par

## 2020-04-08 ENCOUNTER — APPOINTMENT (OUTPATIENT)
Dept: HYPERBARIC MEDICINE | Facility: HOSPITAL | Age: 45
End: 2020-04-08

## 2020-04-09 ENCOUNTER — APPOINTMENT (OUTPATIENT)
Dept: HYPERBARIC MEDICINE | Facility: HOSPITAL | Age: 45
End: 2020-04-09

## 2020-04-10 ENCOUNTER — APPOINTMENT (OUTPATIENT)
Dept: HYPERBARIC MEDICINE | Facility: HOSPITAL | Age: 45
End: 2020-04-10

## 2020-04-13 ENCOUNTER — APPOINTMENT (OUTPATIENT)
Dept: HYPERBARIC MEDICINE | Facility: HOSPITAL | Age: 45
End: 2020-04-13
Payer: COMMERCIAL

## 2020-04-13 ENCOUNTER — OUTPATIENT (OUTPATIENT)
Dept: OUTPATIENT SERVICES | Facility: HOSPITAL | Age: 45
LOS: 1 days | Discharge: HOME | End: 2020-04-13
Payer: COMMERCIAL

## 2020-04-13 VITALS
RESPIRATION RATE: 18 BRPM | DIASTOLIC BLOOD PRESSURE: 62 MMHG | TEMPERATURE: 98 F | OXYGEN SATURATION: 99 % | HEART RATE: 73 BPM | SYSTOLIC BLOOD PRESSURE: 159 MMHG

## 2020-04-13 VITALS
OXYGEN SATURATION: 100 % | TEMPERATURE: 97.3 F | HEART RATE: 70 BPM | RESPIRATION RATE: 16 BRPM | DIASTOLIC BLOOD PRESSURE: 88 MMHG | SYSTOLIC BLOOD PRESSURE: 163 MMHG

## 2020-04-13 DIAGNOSIS — E10.621 TYPE 1 DIABETES MELLITUS WITH FOOT ULCER: ICD-10-CM

## 2020-04-13 DIAGNOSIS — E11.621 TYPE 2 DIABETES MELLITUS WITH FOOT ULCER: ICD-10-CM

## 2020-04-13 DIAGNOSIS — Z79.4 LONG TERM (CURRENT) USE OF INSULIN: ICD-10-CM

## 2020-04-13 DIAGNOSIS — I77.0 ARTERIOVENOUS FISTULA, ACQUIRED: Chronic | ICD-10-CM

## 2020-04-13 DIAGNOSIS — L97.422 NON-PRESSURE CHRONIC ULCER OF LEFT HEEL AND MIDFOOT WITH FAT LAYER EXPOSED: ICD-10-CM

## 2020-04-13 PROCEDURE — 82962 GLUCOSE BLOOD TEST: CPT

## 2020-04-13 PROCEDURE — G0277: CPT

## 2020-04-13 PROCEDURE — 99183 HYPERBARIC OXYGEN THERAPY: CPT

## 2020-04-14 ENCOUNTER — OUTPATIENT (OUTPATIENT)
Dept: OUTPATIENT SERVICES | Facility: HOSPITAL | Age: 45
LOS: 1 days | Discharge: HOME | End: 2020-04-14
Payer: COMMERCIAL

## 2020-04-14 ENCOUNTER — APPOINTMENT (OUTPATIENT)
Dept: HYPERBARIC MEDICINE | Facility: HOSPITAL | Age: 45
End: 2020-04-14
Payer: COMMERCIAL

## 2020-04-14 VITALS
HEART RATE: 79 BPM | DIASTOLIC BLOOD PRESSURE: 82 MMHG | TEMPERATURE: 98.2 F | SYSTOLIC BLOOD PRESSURE: 157 MMHG | RESPIRATION RATE: 16 BRPM | OXYGEN SATURATION: 100 %

## 2020-04-14 VITALS
SYSTOLIC BLOOD PRESSURE: 129 MMHG | TEMPERATURE: 98.8 F | OXYGEN SATURATION: 100 % | DIASTOLIC BLOOD PRESSURE: 72 MMHG | HEART RATE: 77 BPM | RESPIRATION RATE: 18 BRPM

## 2020-04-14 DIAGNOSIS — L97.422 NON-PRESSURE CHRONIC ULCER OF LEFT HEEL AND MIDFOOT WITH FAT LAYER EXPOSED: ICD-10-CM

## 2020-04-14 DIAGNOSIS — Z79.4 LONG TERM (CURRENT) USE OF INSULIN: ICD-10-CM

## 2020-04-14 DIAGNOSIS — E10.621 TYPE 1 DIABETES MELLITUS WITH FOOT ULCER: ICD-10-CM

## 2020-04-14 DIAGNOSIS — I77.0 ARTERIOVENOUS FISTULA, ACQUIRED: Chronic | ICD-10-CM

## 2020-04-14 DIAGNOSIS — E11.621 TYPE 2 DIABETES MELLITUS WITH FOOT ULCER: ICD-10-CM

## 2020-04-14 PROCEDURE — 99183 HYPERBARIC OXYGEN THERAPY: CPT

## 2020-04-14 PROCEDURE — 82962 GLUCOSE BLOOD TEST: CPT

## 2020-04-14 PROCEDURE — G0277: CPT

## 2020-04-14 NOTE — ADDENDUM
[FreeTextEntry1] : Drainage noted CHRN cleared  pt to dive and will receive dressing change post tx . \par Pt ascended to 2.0 RIVAS @ 1.75 PSI/min without incident in chamber # 1. \par Pt resting @ depth with chest rise and fall observed by chamber side. \par Pt tolerated  both air breaks well. \par Pt ascended from 2.0 RIVAS @ 1.75 PSI/min without incident. \par Pt tolerated tx well. Pt received Wc by LPN post tx . \par

## 2020-04-14 NOTE — PROCEDURE
[Outpatient] : Outpatient [Ambulatory] : Patient is ambulatory. [THIS CHAMBER HAS BEEN CLEANED / DISINFECTED] : This chamber has been cleaned / disinfected according to local and hospital policy and procedure prior to this treatment. [Patient demonstrated and verbalized proper technique for using air break mask] : Patient demonstrated and verbalized proper technique for using air break mask [Patient educated on the risks of SMOKING prior to HBOT with understanding] : Patient educated on the risks of SMOKING prior to HBOT with understanding [Patient educated on the risks of CONSUMING ALCOHOL prior to HBOT with understanding] : Patient educated on the risks of CONSUMING ALCOHOL prior to HBOT with understanding [100% Cotton] : 100% cotton [Empty all pockets] : empty all pockets [No hair oils, wigs, hairpieces, pins] : no hair oils, wigs, hairpieces, pins  [Pre tx medications] : pre tx medications  [No make-up, creams] : no make-up, creams  [No jewelry] : no jewelry  [No matches, cigarettes, lighters] : no matches, cigarettes, lighters  [Hearing aid removed] : hearing aid removed [Dentures removed] : dentures removed [Ground bracelet on pt's wrist] : ground bracelet on pt's wrist  [Contacts removed] : contacts removed  [Remove nail polish] : remove nail polish  [No reading material] : no reading material  [Bra, undergarments removed] : bra, undergarments removed  [No contraindicated dressings] : no contraindicated dressings [Ground Wire - VISUAL Verification - Intact/Free of Obstruction] : Ground Wire - VISUAL Verification - Intact/Free of Obstruction  [Ground Continuity - Verified < 1 ohm w/ Wrist Strap Barb] : Ground Continuity - Verified < 1 ohm w/ Wrist Strap Barb [Number: ___] : Number: [unfilled] [Diagnosis: ___] : Diagnosis: [unfilled] [____] : Post-Dive: Time - [unfilled] [___] : Post-Dive: Value - [unfilled] mg/dL [Clear all fields] : clear all fields [] : No [FreeTextEntry1] : 2.0 [FreeTextEntry3] : 90 mins [FreeTextEntry5] : 7693 [FreeTextEntry7] : 8500 [FreeTextEntry9] : 0544 [de-identified] : 0989 [de-identified] : 108 mins

## 2020-04-14 NOTE — PROCEDURE
[Outpatient] : Outpatient [Ambulatory] : Patient is ambulatory. [THIS CHAMBER HAS BEEN CLEANED / DISINFECTED] : This chamber has been cleaned / disinfected according to local and hospital policy and procedure prior to this treatment. [Patient demonstrated and verbalized proper technique for using air break mask] : Patient demonstrated and verbalized proper technique for using air break mask [Patient educated on the risks of SMOKING prior to HBOT with understanding] : Patient educated on the risks of SMOKING prior to HBOT with understanding [Patient educated on the risks of CONSUMING ALCOHOL prior to HBOT with understanding] : Patient educated on the risks of CONSUMING ALCOHOL prior to HBOT with understanding [100% Cotton] : 100% cotton [Empty all pockets] : empty all pockets [No hair oils, wigs, hairpieces, pins] : no hair oils, wigs, hairpieces, pins  [Pre tx medications] : pre tx medications  [No make-up, creams] : no make-up, creams  [No jewelry] : no jewelry  [No matches, cigarettes, lighters] : no matches, cigarettes, lighters  [Hearing aid removed] : hearing aid removed [Dentures removed] : dentures removed [Ground bracelet on pt's wrist] : ground bracelet on pt's wrist  [Contacts removed] : contacts removed  [Remove nail polish] : remove nail polish  [No reading material] : no reading material  [Bra, undergarments removed] : bra, undergarments removed  [No contraindicated dressings] : no contraindicated dressings [Ground Wire - VISUAL Verification - Intact/Free of Obstruction] : Ground Wire - VISUAL Verification - Intact/Free of Obstruction  [Ground Continuity - Verified < 1 ohm w/ Wrist Strap Barb] : Ground Continuity - Verified < 1 ohm w/ Wrist Strap Barb [Diagnosis: ___] : Diagnosis: [unfilled] [Number: ___] : Number: [unfilled] [____] : Post-Dive: Time - [unfilled] [___] : Post-Dive: Value - [unfilled] mg/dL [Clear all fields] : clear all fields [] : No [FreeTextEntry1] : 2.0 [FreeTextEntry3] : 90 mins [FreeTextEntry5] : 4851 [FreeTextEntry7] : 2683 [FreeTextEntry9] : 2727 [de-identified] : 5684 [de-identified] : 108 mins

## 2020-04-14 NOTE — ADDENDUM
[FreeTextEntry1] : Drainage noted pt to received dressing change post tx . \par Pt descended to 2.0 RIVAS @ 1.75 PSI/min without incident in chamber # 1. \par Pt resting @ depth with chest rise and fall observed by chamber side. \par Pt ascended from 2.0 RIVAS @ 1.75 PSI/min without incident. \par Pt tolerated tx well. Pt Recieved Dressing change by LPN post tx. . \par

## 2020-04-15 ENCOUNTER — OUTPATIENT (OUTPATIENT)
Dept: OUTPATIENT SERVICES | Facility: HOSPITAL | Age: 45
LOS: 1 days | Discharge: HOME | End: 2020-04-15
Payer: COMMERCIAL

## 2020-04-15 ENCOUNTER — APPOINTMENT (OUTPATIENT)
Dept: HYPERBARIC MEDICINE | Facility: HOSPITAL | Age: 45
End: 2020-04-15

## 2020-04-15 ENCOUNTER — APPOINTMENT (OUTPATIENT)
Dept: PODIATRY | Facility: HOSPITAL | Age: 45
End: 2020-04-15
Payer: COMMERCIAL

## 2020-04-15 VITALS
OXYGEN SATURATION: 100 % | BODY MASS INDEX: 27.62 KG/M2 | HEART RATE: 78 BPM | WEIGHT: 176 LBS | TEMPERATURE: 98.4 F | HEIGHT: 67 IN | DIASTOLIC BLOOD PRESSURE: 85 MMHG | RESPIRATION RATE: 20 BRPM | SYSTOLIC BLOOD PRESSURE: 161 MMHG

## 2020-04-15 DIAGNOSIS — I77.0 ARTERIOVENOUS FISTULA, ACQUIRED: Chronic | ICD-10-CM

## 2020-04-15 DIAGNOSIS — E11.621 TYPE 2 DIABETES MELLITUS WITH FOOT ULCER: ICD-10-CM

## 2020-04-15 DIAGNOSIS — Z89.422 ACQUIRED ABSENCE OF OTHER LEFT TOE(S): ICD-10-CM

## 2020-04-15 PROCEDURE — 11042 DBRDMT SUBQ TIS 1ST 20SQCM/<: CPT

## 2020-04-15 NOTE — PROCEDURE
[Betadine] : betadine [Fibrotic] : fibrotic [Slough] : slough [Necrotic] : necrotic [Scalpel] : scalpel [Sharp scissors] : sharp scissors [Sharp curette] : sharp curette [Skin] : skin [Fat] : fat [Subcutaneous tissue] : subcutaneous tissue [Clean] : clean [Pressure] : pressure [FreeTextEntry2] : bilateral heels  [FreeTextEntry1] : silver alginate  [] : Yes [FreeTextEntry9] : 950 [de-identified] : Bilateral DFU 3 heels  [de-identified] : Chao  [FreeTextEntry6] : Bilateral DFU 3 heels  [FreeTextEntry7] : same  [de-identified] : 0- [de-identified] : 10 cc [de-identified] : necrotic skin, subcutaneous and fat

## 2020-04-15 NOTE — REVIEW OF SYSTEMS
[Fever] : no fever [Chills] : no chills [Loss Of Hearing] : no hearing loss [Shortness Of Breath] : no shortness of breath [Abdominal Pain] : no abdominal pain [Vomiting] : no vomiting [Arthralgias] : arthralgias [Joint Stiffness] : joint stiffness [Skin Wound] : skin wound [Anxiety] : no anxiety [Easy Bleeding] : no tendency for easy bleeding [FreeTextEntry5] : diabetic small vessel diseas and cardio vascular disease  [de-identified] : DFU 3 plantar posterior left and right heel down to skin, subcutaneous tissue, and fat , necrotic  [de-identified] : IDDM with neuropathy  [de-identified] : IDDM , patient on Dialysis

## 2020-04-15 NOTE — PROCEDURE
[Betadine] : betadine [Fibrotic] : fibrotic [Slough] : slough [Necrotic] : necrotic [Scalpel] : scalpel [Sharp scissors] : sharp scissors [Sharp curette] : sharp curette [Skin] : skin [Fat] : fat [Subcutaneous tissue] : subcutaneous tissue [Clean] : clean [Pressure] : pressure [FreeTextEntry2] : bilateral heels  [FreeTextEntry1] : silver alginate  [] : Yes [FreeTextEntry9] : 950 [de-identified] : Bilateral DFU 3 heels  [de-identified] : Chao  [FreeTextEntry6] : Bilateral DFU 3 heels  [FreeTextEntry7] : same  [de-identified] : 0- [de-identified] : 10 cc [de-identified] : necrotic skin, subcutaneous and fat

## 2020-04-15 NOTE — REVIEW OF SYSTEMS
[Fever] : no fever [Chills] : no chills [Loss Of Hearing] : no hearing loss [Shortness Of Breath] : no shortness of breath [Abdominal Pain] : no abdominal pain [Vomiting] : no vomiting [Arthralgias] : arthralgias [Joint Stiffness] : joint stiffness [Skin Wound] : skin wound [Anxiety] : no anxiety [Easy Bleeding] : no tendency for easy bleeding [FreeTextEntry5] : diabetic small vessel diseas and cardio vascular disease  [de-identified] : DFU 3 plantar posterior left and right heel down to skin, subcutaneous tissue, and fat , necrotic  [de-identified] : IDDM with neuropathy  [de-identified] : IDDM , patient on Dialysis

## 2020-04-16 ENCOUNTER — APPOINTMENT (OUTPATIENT)
Dept: HYPERBARIC MEDICINE | Facility: HOSPITAL | Age: 45
End: 2020-04-16
Payer: COMMERCIAL

## 2020-04-16 ENCOUNTER — OUTPATIENT (OUTPATIENT)
Dept: OUTPATIENT SERVICES | Facility: HOSPITAL | Age: 45
LOS: 1 days | Discharge: HOME | End: 2020-04-16
Payer: COMMERCIAL

## 2020-04-16 VITALS
DIASTOLIC BLOOD PRESSURE: 77 MMHG | SYSTOLIC BLOOD PRESSURE: 134 MMHG | RESPIRATION RATE: 16 BRPM | TEMPERATURE: 99 F | HEART RATE: 82 BPM | OXYGEN SATURATION: 100 %

## 2020-04-16 VITALS
RESPIRATION RATE: 18 BRPM | HEART RATE: 78 BPM | DIASTOLIC BLOOD PRESSURE: 83 MMHG | OXYGEN SATURATION: 100 % | TEMPERATURE: 97.8 F | SYSTOLIC BLOOD PRESSURE: 180 MMHG

## 2020-04-16 DIAGNOSIS — I77.0 ARTERIOVENOUS FISTULA, ACQUIRED: Chronic | ICD-10-CM

## 2020-04-16 DIAGNOSIS — E10.621 TYPE 1 DIABETES MELLITUS WITH FOOT ULCER: ICD-10-CM

## 2020-04-16 DIAGNOSIS — L97.422 NON-PRESSURE CHRONIC ULCER OF LEFT HEEL AND MIDFOOT WITH FAT LAYER EXPOSED: ICD-10-CM

## 2020-04-16 DIAGNOSIS — E11.621 TYPE 2 DIABETES MELLITUS WITH FOOT ULCER: ICD-10-CM

## 2020-04-16 DIAGNOSIS — Z79.4 LONG TERM (CURRENT) USE OF INSULIN: ICD-10-CM

## 2020-04-16 PROCEDURE — 99183 HYPERBARIC OXYGEN THERAPY: CPT

## 2020-04-16 PROCEDURE — 82962 GLUCOSE BLOOD TEST: CPT

## 2020-04-16 PROCEDURE — G0277: CPT

## 2020-04-16 NOTE — PHYSICAL EXAM
[0] : left 0 [1+] : left 1+ [Varicose Veins Of Lower Extremities] : bilaterally [Ankle Swelling On The Right] : mild [No Rash or Lesion] : No rash or lesion [Purpura] : no purpura  [Petechiae] : no petechiae [Skin Ulcer] : ulcer [Calm] : calm [de-identified] : comfortable  [de-identified] : Diabetic small vessel and cardio vascular disease  [de-identified] : previous calcanectomy of the right heel , possible OM of the left heel , OM of the right pinkie finger  [de-identified] : DFU 3 plantar posterior left and right heel down to skin, subcutaneous tissue, and fat [de-identified] : Diabetic neuropathy , Dialysis  3x per week  [de-identified] : Small amount of Blood Loss, No Pain During or Post Procedure, Pt tolerated Well.\par  [de-identified] : Post Debridement Measurements 2.4 x 0.4 x 0.7 [de-identified] : Small amount of Blood Loss, No Pain During or Post Procedure, Pt tolerated Well.\par  [de-identified] : Post Debridement Measurements 1.0 x 0.6 x 0.4 [TWNoteComboBox7] : Isabela [de-identified] : Debridement performed of all devitalized tissue to bleeding viable tissue [TWNoteComboBox8] : Isabela [de-identified] : Debridement performed of all devitalized tissue to bleeding viable tissue [FreeTextEntry1] : Left Posterior Heel [FreeTextEntry2] : 2.3 [FreeTextEntry3] : 0.2 [FreeTextEntry4] : 0.6 [de-identified] : Serosanguineous [de-identified] : 6-12 o'clock 0.6 cm  [de-identified] : Callus [de-identified] : Silver Alginate [de-identified] : Cleansed with Normal Saline\par  [FreeTextEntry7] : Right Posterior Heel [FreeTextEntry8] : 0.9 [FreeTextEntry9] : 0.4 [de-identified] : 0.3 [de-identified] : Serosanguineous [de-identified] : 12-4/ 7-12 O'clock 0.8cm [de-identified] : Callus [de-identified] : Silver Alginate [de-identified] : Cleansed with Normal Saline\par  [TWNoteComboBox4] : Small [TWNoteComboBox5] : No [de-identified] : Yes [de-identified] : None [de-identified] : 100% [de-identified] : None [de-identified] : No [de-identified] : 3x Weekly [de-identified] : Small [de-identified] : No [de-identified] : Yes [de-identified] : None [de-identified] : 100% [de-identified] : 100% [de-identified] : No [de-identified] : 3x Weekly

## 2020-04-16 NOTE — ASSESSMENT
[Skin Care] : skin care [Verbal] : Verbal [Patient] : Patient [Good - alert, interested, motivated] : Good - alert, interested, motivated [Verbalizes knowledge/Understanding] : Verbalizes knowledge/understanding [Dressing changes] : dressing changes [Foot Care] : foot care [Pressure relief] : pressure relief [Signs and symptoms of infection] : sign and symptoms of infection [How and When to Call] : how and when to call [Hyperbaric Therapy] : hyperbaric therapy [Off-loading] : off-loading [Patient responsibility to plan of care] : patient responsibility to plan of care [] : Yes [Stable] : stable [Home] : Home [Wheelchair] : Wheelchair [Not Applicable - Long Term Care/Home Health Agency] : Long Term Care/Home Health Agency: Not Applicable [FreeTextEntry2] : Restore Skin Integrity\par Infection Control\par Localized wound care\par Offloading\par Develop Realistic expectations and measurable treatments nursing POC to reduce, eliminate or develop acceptable pain limit tolerance [FreeTextEntry4] : Photos Taken\par F/U to [facility] in

## 2020-04-16 NOTE — PHYSICAL EXAM
[0] : left 0 [1+] : left 1+ [Varicose Veins Of Lower Extremities] : bilaterally [Ankle Swelling On The Right] : mild [No Rash or Lesion] : No rash or lesion [Purpura] : no purpura  [Petechiae] : no petechiae [Skin Ulcer] : ulcer [Calm] : calm [de-identified] : comfortable  [de-identified] : Diabetic small vessel and cardio vascular disease  [de-identified] : previous calcanectomy of the right heel , possible OM of the left heel , OM of the right pinkie finger  [de-identified] : DFU 3 plantar posterior left and right heel down to skin, subcutaneous tissue, and fat [de-identified] : Diabetic neuropathy , Dialysis  3x per week  [de-identified] : Small amount of Blood Loss, No Pain During or Post Procedure, Pt tolerated Well.\par  [de-identified] : Post Debridement Measurements 2.4 x 0.4 x 0.7 [de-identified] : Small amount of Blood Loss, No Pain During or Post Procedure, Pt tolerated Well.\par  [de-identified] : Post Debridement Measurements 1.0 x 0.6 x 0.4 [TWNoteComboBox7] : Isabela [de-identified] : Debridement performed of all devitalized tissue to bleeding viable tissue [TWNoteComboBox8] : Isabela [de-identified] : Debridement performed of all devitalized tissue to bleeding viable tissue [FreeTextEntry1] : Left Posterior Heel [FreeTextEntry2] : 2.3 [FreeTextEntry3] : 0.2 [FreeTextEntry4] : 0.6 [de-identified] : Serosanguineous [de-identified] : 6-12 o'clock 0.6 cm  [de-identified] : Callus [de-identified] : Silver Alginate [de-identified] : Cleansed with Normal Saline\par  [FreeTextEntry7] : Right Posterior Heel [FreeTextEntry8] : 0.9 [FreeTextEntry9] : 0.4 [de-identified] : 0.3 [de-identified] : Serosanguineous [de-identified] : 12-4/ 7-12 O'clock 0.8cm [de-identified] : Callus [de-identified] : Silver Alginate [de-identified] : Cleansed with Normal Saline\par  [TWNoteComboBox4] : Small [TWNoteComboBox5] : No [de-identified] : Yes [de-identified] : None [de-identified] : 100% [de-identified] : None [de-identified] : No [de-identified] : 3x Weekly [de-identified] : Small [de-identified] : No [de-identified] : Yes [de-identified] : None [de-identified] : 100% [de-identified] : 100% [de-identified] : No [de-identified] : 3x Weekly

## 2020-04-16 NOTE — ADDENDUM
[FreeTextEntry1] : DRAINAGE NOTED ON PT'S DRESSING PRIOR TO DESCENT. RN NOTIFIED. PT  TO RECEIVE WOUND CARE POST HBOT.\par \par PT DESCENDED TO 2.0 RIVAS @ 1.75 PSI/MIN WITHOUT INCIDENT IN CHAMBER # 1. PT'S RESTING AT TX DEPTH WITH HEELS OFFLOADED. CHEST RISE AND FALL OBSERVED CHAMBERSIDE.\par \par PT ASCENDED FROM 2.0 RIVAS @ 1.75 PSI/MIN WITHOUT INCIDENT. PT TOLERATED TX WELL.

## 2020-04-16 NOTE — PROCEDURE
[Outpatient] : Outpatient [Wheelchair] : wheelchair [Patient demonstrated and verbalized proper technique for using air break mask] : Patient demonstrated and verbalized proper technique for using air break mask [Patient educated on the risks of SMOKING prior to HBOT with understanding] : Patient educated on the risks of SMOKING prior to HBOT with understanding [Patient educated on the risks of CONSUMING ALCOHOL prior to HBOT with understanding] : Patient educated on the risks of CONSUMING ALCOHOL prior to HBOT with understanding [100% Cotton] : 100% cotton [Empty all pockets] : empty all pockets [No hair oils, wigs, hairpieces, pins] : no hair oils, wigs, hairpieces, pins  [Pre tx medications] : pre tx medications  [No make-up, creams] : no make-up, creams  [No jewelry] : no jewelry  [No matches, cigarettes, lighters] : no matches, cigarettes, lighters  [Hearing aid removed] : hearing aid removed [Dentures removed] : dentures removed [Ground bracelet on pt's wrist] : ground bracelet on pt's wrist  [Contacts removed] : contacts removed  [Remove nail polish] : remove nail polish  [No reading material] : no reading material  [Bra, undergarments removed] : bra, undergarments removed  [No contraindicated dressings] : no contraindicated dressings [Ground Wire - VISUAL Verification - Intact/Free of Obstruction] : Ground Wire - VISUAL Verification - Intact/Free of Obstruction  [Ground Continuity - Verified < 1 ohm w/ Wrist Strap Barb] : Ground Continuity - Verified < 1 ohm w/ Wrist Strap Barb [Number: ___] : Number: [unfilled] [Diagnosis: ___] : Diagnosis: [unfilled] [____] : Post-Dive: Time - [unfilled] [___] : Post-Dive: Value - [unfilled] mg/dL [Clear all fields] : clear all fields [] : No [FreeTextEntry3] : 90 [FreeTextEntry5] : 4607 [FreeTextEntry7] : 1833 [FreeTextEntry9] : 4263 [de-identified] : 0980 [de-identified] : 108 MIN

## 2020-04-16 NOTE — ASSESSMENT
[Skin Care] : skin care [Verbal] : Verbal [Patient] : Patient [Good - alert, interested, motivated] : Good - alert, interested, motivated [Verbalizes knowledge/Understanding] : Verbalizes knowledge/understanding [Dressing changes] : dressing changes [Foot Care] : foot care [Pressure relief] : pressure relief [Signs and symptoms of infection] : sign and symptoms of infection [How and When to Call] : how and when to call [Hyperbaric Therapy] : hyperbaric therapy [Off-loading] : off-loading [Patient responsibility to plan of care] : patient responsibility to plan of care [] : Yes [Stable] : stable [Home] : Home [Wheelchair] : Wheelchair [Not Applicable - Long Term Care/Home Health Agency] : Long Term Care/Home Health Agency: Not Applicable [FreeTextEntry2] : Restore Skin Integrity\par Infection Control\par Localized wound care\par Offloading\par Develop Realistic expectations and measurable treatments nursing POC to reduce, eliminate or develop acceptable pain limit tolerance [FreeTextEntry4] : Photos Taken\par F/U to [facility] in

## 2020-04-17 ENCOUNTER — OUTPATIENT (OUTPATIENT)
Dept: OUTPATIENT SERVICES | Facility: HOSPITAL | Age: 45
LOS: 1 days | Discharge: HOME | End: 2020-04-17
Payer: COMMERCIAL

## 2020-04-17 ENCOUNTER — APPOINTMENT (OUTPATIENT)
Dept: HYPERBARIC MEDICINE | Facility: HOSPITAL | Age: 45
End: 2020-04-17
Payer: COMMERCIAL

## 2020-04-17 VITALS
HEART RATE: 79 BPM | SYSTOLIC BLOOD PRESSURE: 198 MMHG | OXYGEN SATURATION: 100 % | DIASTOLIC BLOOD PRESSURE: 87 MMHG | TEMPERATURE: 98.4 F | RESPIRATION RATE: 16 BRPM

## 2020-04-17 VITALS
HEART RATE: 76 BPM | OXYGEN SATURATION: 100 % | DIASTOLIC BLOOD PRESSURE: 80 MMHG | RESPIRATION RATE: 16 BRPM | SYSTOLIC BLOOD PRESSURE: 180 MMHG | TEMPERATURE: 97.2 F

## 2020-04-17 VITALS — SYSTOLIC BLOOD PRESSURE: 190 MMHG | DIASTOLIC BLOOD PRESSURE: 88 MMHG

## 2020-04-17 DIAGNOSIS — E10.621 TYPE 1 DIABETES MELLITUS WITH FOOT ULCER: ICD-10-CM

## 2020-04-17 DIAGNOSIS — L97.422 NON-PRESSURE CHRONIC ULCER OF LEFT HEEL AND MIDFOOT WITH FAT LAYER EXPOSED: ICD-10-CM

## 2020-04-17 DIAGNOSIS — Z79.4 LONG TERM (CURRENT) USE OF INSULIN: ICD-10-CM

## 2020-04-17 DIAGNOSIS — I77.0 ARTERIOVENOUS FISTULA, ACQUIRED: Chronic | ICD-10-CM

## 2020-04-17 DIAGNOSIS — E11.621 TYPE 2 DIABETES MELLITUS WITH FOOT ULCER: ICD-10-CM

## 2020-04-17 PROBLEM — E10.40: Status: ACTIVE | Noted: 2020-04-17

## 2020-04-17 PROCEDURE — G0277: CPT

## 2020-04-17 PROCEDURE — 99183 HYPERBARIC OXYGEN THERAPY: CPT

## 2020-04-17 PROCEDURE — 82962 GLUCOSE BLOOD TEST: CPT

## 2020-04-17 NOTE — REVIEW OF SYSTEMS
[Arthralgias] : arthralgias [Joint Stiffness] : joint stiffness [Skin Wound] : skin wound [Fever] : no fever [Chills] : no chills [Loss Of Hearing] : no hearing loss [Shortness Of Breath] : no shortness of breath [Abdominal Pain] : no abdominal pain [Vomiting] : no vomiting [Anxiety] : no anxiety [Easy Bleeding] : no tendency for easy bleeding [FreeTextEntry5] : diabetic small vessel diseas and cardio vascular disease  [de-identified] : DFU 3 plantar posterior left and right heel down to skin, subcutaneous tissue, and fat [de-identified] : IDDM with neuropathy  [de-identified] : IDDM , patient on Dialysis

## 2020-04-17 NOTE — PLAN
[FreeTextEntry1] : wound care , off loading\par patient to continue HBOT\par pt will need bilateral afo w/dispersion padding to control foot and ankle instability

## 2020-04-17 NOTE — PHYSICAL EXAM
[0] : left 0 [1+] : left 1+ [Varicose Veins Of Lower Extremities] : bilaterally [Ankle Swelling On The Right] : mild [No Rash or Lesion] : No rash or lesion [Skin Ulcer] : ulcer [Calm] : calm [Purpura] : no purpura  [Petechiae] : no petechiae [de-identified] : comfortable  [de-identified] : Diabetic small vessel and cardio vascular disease  [de-identified] : previous calcanectomy of the right heel , possible OM of the left heel , OM of the right pinkie finger  [de-identified] : DFU 3 plantar posterior left and right heel down to skin, subcutaneous tissue, and fat [de-identified] : Diabetic neuropathy , Dialysis  3x per week  [de-identified] : DPM Shaved Callus [FreeTextEntry1] : Left Posterior Heel [FreeTextEntry2] : 2.1 [FreeTextEntry3] : 0.2 [FreeTextEntry4] : 0.4 [de-identified] : Serosanguineous [de-identified] : Callus [de-identified] : Silver Alginate [de-identified] : Cleansed with Normal Saline\par  [de-identified] : DPM Shaved Callus [FreeTextEntry7] : Right Posterior Heel [FreeTextEntry8] : 0.4 [FreeTextEntry9] : 0.8 [de-identified] : 0.3 [de-identified] : Serosanguineous [de-identified] : Callus [de-identified] : Silver Alginate [de-identified] : Cleansed with Normal Saline\par  [TWNoteComboBox4] : Moderate [TWNoteComboBox5] : No [de-identified] : No [de-identified] : None [de-identified] : None [de-identified] : 100% [de-identified] : No [de-identified] : 3x Weekly [de-identified] : Small [de-identified] : No [de-identified] : No [de-identified] : None [de-identified] : None [de-identified] : 100% [de-identified] : No [de-identified] : 3x Weekly

## 2020-04-17 NOTE — ASSESSMENT
[Verbal] : Verbal [Patient] : Patient [Good - alert, interested, motivated] : Good - alert, interested, motivated [Verbalizes knowledge/Understanding] : Verbalizes knowledge/understanding [Dressing changes] : dressing changes [Skin Care] : skin care [Signs and symptoms of infection] : sign and symptoms of infection [How and When to Call] : how and when to call [Patient responsibility to plan of care] : patient responsibility to plan of care [] : Yes [Stable] : stable [Home] : Home [Wheelchair] : Wheelchair [Not Applicable - Long Term Care/Home Health Agency] : Long Term Care/Home Health Agency: Not Applicable [Hyperbaric Therapy] : hyperbaric therapy [FreeTextEntry2] : Restore Skin Integrity\par Infection Control\par Localized wound care\par HBOT\par Develop Realistic expectations and measurable treatments nursing POC to reduce, eliminate or develop acceptable pain limit tolerance [FreeTextEntry4] : Photos Taken\par HBOT 10/30\par F/U to [facility] Daily HBOT, 1 week for assessment

## 2020-04-18 DIAGNOSIS — L84 CORNS AND CALLOSITIES: ICD-10-CM

## 2020-04-18 DIAGNOSIS — L97.422 NON-PRESSURE CHRONIC ULCER OF LEFT HEEL AND MIDFOOT WITH FAT LAYER EXPOSED: ICD-10-CM

## 2020-04-18 DIAGNOSIS — E10.40 TYPE 1 DIABETES MELLITUS WITH DIABETIC NEUROPATHY, UNSPECIFIED: ICD-10-CM

## 2020-04-18 DIAGNOSIS — Z98.49 CATARACT EXTRACTION STATUS, UNSPECIFIED EYE: ICD-10-CM

## 2020-04-18 DIAGNOSIS — E10.621 TYPE 1 DIABETES MELLITUS WITH FOOT ULCER: ICD-10-CM

## 2020-04-18 DIAGNOSIS — Z89.422 ACQUIRED ABSENCE OF OTHER LEFT TOE(S): ICD-10-CM

## 2020-04-18 DIAGNOSIS — Z79.899 OTHER LONG TERM (CURRENT) DRUG THERAPY: ICD-10-CM

## 2020-04-18 DIAGNOSIS — N19 UNSPECIFIED KIDNEY FAILURE: ICD-10-CM

## 2020-04-18 DIAGNOSIS — Z79.4 LONG TERM (CURRENT) USE OF INSULIN: ICD-10-CM

## 2020-04-18 DIAGNOSIS — E10.29 TYPE 1 DIABETES MELLITUS WITH OTHER DIABETIC KIDNEY COMPLICATION: ICD-10-CM

## 2020-04-18 DIAGNOSIS — I83.93 ASYMPTOMATIC VARICOSE VEINS OF BILATERAL LOWER EXTREMITIES: ICD-10-CM

## 2020-04-18 DIAGNOSIS — Z99.2 DEPENDENCE ON RENAL DIALYSIS: ICD-10-CM

## 2020-04-20 ENCOUNTER — APPOINTMENT (OUTPATIENT)
Dept: HYPERBARIC MEDICINE | Facility: HOSPITAL | Age: 45
End: 2020-04-20

## 2020-04-20 NOTE — ADDENDUM
[FreeTextEntry1] : PRIOR TO DIVE, PT'S BLOOD PRESSURE HIGH. IMMEDIATE RE-TEST PERFORMED. PT'S BLOOD PRESSURE STILL HIGH. PT RESTED AND RE-TESTED. AFTER 5 MINUTES, PT'S BLOOD PRESSURE STILL HIGH. RN NOTIFIED. PT RESTED ADDITIONAL 5 MINUTES. PT'S BLOOD PRESSURE TAKEN MANUALLY. PT'S BLOOD PRESSURE WITHIN DESIRED RANGE FOR HBOT.\par \par DRAINAGE NOTED ON PT'S DRESSING PRIOR TO DESCENT. RN NOTIFIED. PT TO RECEIVE WOUND CARE POST HBOT.\par \par PT DESCENDED TO 2.0 RIVAS @ 1.75 PSI/MIN WITHOUT INCIDENT IN CHAMBER # 1. PT'S RESTING AT TX DEPTH WITH WOUND OFFLOADED.CHEST RISE AND FALL OBSERVED CHAMBERSIDE.\par \par PT ASCENDED FROM 2.0 RIVAS @ 1.75 PSI/MIN WITHOUT INCIDENT. PT TOLERATED TX WELL.

## 2020-04-20 NOTE — PROCEDURE
[Outpatient] : Outpatient [Wheelchair] : wheelchair [THIS CHAMBER HAS BEEN CLEANED / DISINFECTED] : This chamber has been cleaned / disinfected according to local and hospital policy and procedure prior to this treatment. [Patient demonstrated and verbalized proper technique for using air break mask] : Patient demonstrated and verbalized proper technique for using air break mask [Patient educated on the risks of SMOKING prior to HBOT with understanding] : Patient educated on the risks of SMOKING prior to HBOT with understanding [Patient educated on the risks of CONSUMING ALCOHOL prior to HBOT with understanding] : Patient educated on the risks of CONSUMING ALCOHOL prior to HBOT with understanding [100% Cotton] : 100% cotton [Empty all pockets] : empty all pockets [No hair oils, wigs, hairpieces, pins] : no hair oils, wigs, hairpieces, pins  [Pre tx medications] : pre tx medications  [No make-up, creams] : no make-up, creams  [No jewelry] : no jewelry  [No matches, cigarettes, lighters] : no matches, cigarettes, lighters  [Hearing aid removed] : hearing aid removed [Dentures removed] : dentures removed [Ground bracelet on pt's wrist] : ground bracelet on pt's wrist  [Contacts removed] : contacts removed  [Remove nail polish] : remove nail polish  [No reading material] : no reading material  [Bra, undergarments removed] : bra, undergarments removed  [No contraindicated dressings] : no contraindicated dressings [Ground Wire - VISUAL Verification - Intact/Free of Obstruction] : Ground Wire - VISUAL Verification - Intact/Free of Obstruction  [Ground Continuity - Verified < 1 ohm w/ Wrist Strap Barb] : Ground Continuity - Verified < 1 ohm w/ Wrist Strap Barb [Number: ___] : Number: [unfilled] [Diagnosis: ___] : Diagnosis: [unfilled] [____] : Post-Dive: Time - [unfilled] [___] : Post-Dive: Value - [unfilled] mg/dL [Clear all fields] : clear all fields [] : No [FreeTextEntry7] : 5134 [FreeTextEntry3] : 90 [FreeTextEntry9] : 2142 [FreeTextEntry5] : 0826 [de-identified] : 108 MIN [de-identified] : 1004

## 2020-04-21 ENCOUNTER — OUTPATIENT (OUTPATIENT)
Dept: OUTPATIENT SERVICES | Facility: HOSPITAL | Age: 45
LOS: 1 days | Discharge: HOME | End: 2020-04-21
Payer: COMMERCIAL

## 2020-04-21 ENCOUNTER — APPOINTMENT (OUTPATIENT)
Dept: HYPERBARIC MEDICINE | Facility: HOSPITAL | Age: 45
End: 2020-04-21
Payer: COMMERCIAL

## 2020-04-21 VITALS
HEART RATE: 75 BPM | RESPIRATION RATE: 16 BRPM | TEMPERATURE: 98.5 F | DIASTOLIC BLOOD PRESSURE: 70 MMHG | OXYGEN SATURATION: 100 % | SYSTOLIC BLOOD PRESSURE: 152 MMHG

## 2020-04-21 VITALS
DIASTOLIC BLOOD PRESSURE: 68 MMHG | TEMPERATURE: 98.1 F | OXYGEN SATURATION: 100 % | SYSTOLIC BLOOD PRESSURE: 114 MMHG | HEART RATE: 82 BPM | RESPIRATION RATE: 18 BRPM

## 2020-04-21 DIAGNOSIS — I77.0 ARTERIOVENOUS FISTULA, ACQUIRED: Chronic | ICD-10-CM

## 2020-04-21 DIAGNOSIS — Z79.4 LONG TERM (CURRENT) USE OF INSULIN: ICD-10-CM

## 2020-04-21 DIAGNOSIS — E11.621 TYPE 2 DIABETES MELLITUS WITH FOOT ULCER: ICD-10-CM

## 2020-04-21 DIAGNOSIS — E10.621 TYPE 1 DIABETES MELLITUS WITH FOOT ULCER: ICD-10-CM

## 2020-04-21 DIAGNOSIS — L97.422 NON-PRESSURE CHRONIC ULCER OF LEFT HEEL AND MIDFOOT WITH FAT LAYER EXPOSED: ICD-10-CM

## 2020-04-21 PROCEDURE — 82962 GLUCOSE BLOOD TEST: CPT

## 2020-04-21 PROCEDURE — 99183 HYPERBARIC OXYGEN THERAPY: CPT

## 2020-04-21 PROCEDURE — G0277: CPT

## 2020-04-21 NOTE — ADDENDUM
[FreeTextEntry1] : Drainage cleared to be changed post HBO tx.\par Pt's foot offloaded using wedge. \par Pt descended to 2.0 RIVAS @ 1.75 PSI/min without incident in chamber #1.\par Pt resting @ depth with chest rise and fall observed throughout tx.\par PT ASCENDED FROM 2.0 RIVAS @ 1.75 PSI/MIN WITHOUT INCIDENT. PT TOLERATED TX WELL.

## 2020-04-21 NOTE — PROCEDURE
[Outpatient] : Outpatient [Wheelchair] : wheelchair [THIS CHAMBER HAS BEEN CLEANED / DISINFECTED] : This chamber has been cleaned / disinfected according to local and hospital policy and procedure prior to this treatment. [Patient demonstrated and verbalized proper technique for using air break mask] : Patient demonstrated and verbalized proper technique for using air break mask [Patient educated on the risks of SMOKING prior to HBOT with understanding] : Patient educated on the risks of SMOKING prior to HBOT with understanding [Patient educated on the risks of CONSUMING ALCOHOL prior to HBOT with understanding] : Patient educated on the risks of CONSUMING ALCOHOL prior to HBOT with understanding [100% Cotton] : 100% cotton [Empty all pockets] : empty all pockets [No hair oils, wigs, hairpieces, pins] : no hair oils, wigs, hairpieces, pins  [Pre tx medications] : pre tx medications  [No make-up, creams] : no make-up, creams  [No jewelry] : no jewelry  [No matches, cigarettes, lighters] : no matches, cigarettes, lighters  [Hearing aid removed] : hearing aid removed [Dentures removed] : dentures removed [Ground bracelet on pt's wrist] : ground bracelet on pt's wrist  [Contacts removed] : contacts removed  [Remove nail polish] : remove nail polish  [No reading material] : no reading material  [Bra, undergarments removed] : bra, undergarments removed  [No contraindicated dressings] : no contraindicated dressings [Ground Wire - VISUAL Verification - Intact/Free of Obstruction] : Ground Wire - VISUAL Verification - Intact/Free of Obstruction  [Ground Continuity - Verified < 1 ohm w/ Wrist Strap Barb] : Ground Continuity - Verified < 1 ohm w/ Wrist Strap Barb [Number: ___] : Number: [unfilled] [Diagnosis: ___] : Diagnosis: [unfilled] [____] : Post-Dive: Time - [unfilled] [___] : Post-Dive: Value - [unfilled] mg/dL [Clear all fields] : clear all fields [] : No [FreeTextEntry3] : 90 [FreeTextEntry5] : 2280 [FreeTextEntry7] : 2851 [de-identified] : 100 [FreeTextEntry9] : 3892 [de-identified] : 108 MIN

## 2020-04-21 NOTE — ASSESSMENT
[No change from previous assessment] : No change from previous assessment [No dizziness or thirst] :  No dizziness or thirst [No ear problems] : No ear problems [Vital signs stable] : Vital signs stable [Tolerating dive well] : Tolerating dive well [Respiratory Rate Stable] : Respiratory Rate Stable [No Chest Pain, shortness of breath] : No Chest Pain, shortness of breath [Tolerated Ascent well] : Tolerated Ascent well [No chest pain, shortness of breath, or ear pain] :  No chest pain, shortness of breath, or ear pain  [Vital Signs stable] : Vital Signs stable [No] : No [A physician was present throughout the entire HBOT] : A physician was present throughout the entire HBOT [Clinically Stable] : Clinically stable [Continue Treatment Plan] : Continue treatment plan [0] : 0 out of 10

## 2020-04-22 ENCOUNTER — APPOINTMENT (OUTPATIENT)
Dept: HYPERBARIC MEDICINE | Facility: HOSPITAL | Age: 45
End: 2020-04-22

## 2020-04-23 ENCOUNTER — APPOINTMENT (OUTPATIENT)
Dept: HYPERBARIC MEDICINE | Facility: HOSPITAL | Age: 45
End: 2020-04-23
Payer: COMMERCIAL

## 2020-04-23 ENCOUNTER — OUTPATIENT (OUTPATIENT)
Dept: OUTPATIENT SERVICES | Facility: HOSPITAL | Age: 45
LOS: 1 days | Discharge: HOME | End: 2020-04-23
Payer: COMMERCIAL

## 2020-04-23 VITALS
TEMPERATURE: 98.3 F | SYSTOLIC BLOOD PRESSURE: 100 MMHG | RESPIRATION RATE: 18 BRPM | HEART RATE: 77 BPM | DIASTOLIC BLOOD PRESSURE: 57 MMHG | OXYGEN SATURATION: 100 %

## 2020-04-23 VITALS
TEMPERATURE: 97.4 F | OXYGEN SATURATION: 100 % | HEART RATE: 77 BPM | DIASTOLIC BLOOD PRESSURE: 83 MMHG | SYSTOLIC BLOOD PRESSURE: 145 MMHG | RESPIRATION RATE: 18 BRPM

## 2020-04-23 DIAGNOSIS — I77.0 ARTERIOVENOUS FISTULA, ACQUIRED: Chronic | ICD-10-CM

## 2020-04-23 DIAGNOSIS — E11.621 TYPE 2 DIABETES MELLITUS WITH FOOT ULCER: ICD-10-CM

## 2020-04-23 DIAGNOSIS — Z79.4 LONG TERM (CURRENT) USE OF INSULIN: ICD-10-CM

## 2020-04-23 DIAGNOSIS — L97.422 NON-PRESSURE CHRONIC ULCER OF LEFT HEEL AND MIDFOOT WITH FAT LAYER EXPOSED: ICD-10-CM

## 2020-04-23 DIAGNOSIS — E10.621 TYPE 1 DIABETES MELLITUS WITH FOOT ULCER: ICD-10-CM

## 2020-04-23 PROCEDURE — G0277: CPT

## 2020-04-23 PROCEDURE — 99183 HYPERBARIC OXYGEN THERAPY: CPT

## 2020-04-23 PROCEDURE — 82962 GLUCOSE BLOOD TEST: CPT

## 2020-04-23 NOTE — ASSESSMENT
[Time MD/Provider assessed Patient:_______] : Time MD/Provider assessed Patient: [unfilled] [Patient prepared for dive] : Patient prepared for dive [No change from previous assessment] : No change from previous assessment [No dizziness or thirst] :  No dizziness or thirst [Patient undergoing HBO treatment for __________] : Patient undergoing HBO treatment for [unfilled] [Patient descended without problem for 9 minutes] : Patient descended without problem for 9 minutes [Tolerating dive well] : Tolerating dive well [No ear problems] : No ear problems [Vital signs stable] : Vital signs stable [No Chest Pain, shortness of breath] : No Chest Pain, shortness of breath [Respiratory Rate Stable] : Respiratory Rate Stable [No chest pain, shortness of breath, or ear pain] :  No chest pain, shortness of breath, or ear pain  [Tolerated Ascent well] : Tolerated Ascent well [Vital Signs stable] : Vital Signs stable [A physician was present throughout the entire HBOT] : A physician was present throughout the entire HBOT [No] : No [Clinically Stable] : Clinically stable [Continue Treatment Plan] : Continue treatment plan

## 2020-04-24 ENCOUNTER — APPOINTMENT (OUTPATIENT)
Dept: HYPERBARIC MEDICINE | Facility: HOSPITAL | Age: 45
End: 2020-04-24

## 2020-04-27 ENCOUNTER — APPOINTMENT (OUTPATIENT)
Dept: HYPERBARIC MEDICINE | Facility: HOSPITAL | Age: 45
End: 2020-04-27
Payer: COMMERCIAL

## 2020-04-27 ENCOUNTER — OUTPATIENT (OUTPATIENT)
Dept: OUTPATIENT SERVICES | Facility: HOSPITAL | Age: 45
LOS: 1 days | Discharge: HOME | End: 2020-04-27
Payer: COMMERCIAL

## 2020-04-27 VITALS
SYSTOLIC BLOOD PRESSURE: 157 MMHG | HEART RATE: 78 BPM | OXYGEN SATURATION: 100 % | RESPIRATION RATE: 18 BRPM | TEMPERATURE: 97.8 F | DIASTOLIC BLOOD PRESSURE: 86 MMHG

## 2020-04-27 VITALS
DIASTOLIC BLOOD PRESSURE: 76 MMHG | TEMPERATURE: 98.9 F | RESPIRATION RATE: 18 BRPM | HEART RATE: 80 BPM | OXYGEN SATURATION: 100 % | SYSTOLIC BLOOD PRESSURE: 147 MMHG

## 2020-04-27 DIAGNOSIS — I77.0 ARTERIOVENOUS FISTULA, ACQUIRED: Chronic | ICD-10-CM

## 2020-04-27 DIAGNOSIS — E11.621 TYPE 2 DIABETES MELLITUS WITH FOOT ULCER: ICD-10-CM

## 2020-04-27 PROCEDURE — 99183 HYPERBARIC OXYGEN THERAPY: CPT

## 2020-04-27 PROCEDURE — G0277: CPT

## 2020-04-27 PROCEDURE — 82962 GLUCOSE BLOOD TEST: CPT

## 2020-04-27 NOTE — ADDENDUM
[FreeTextEntry1] : PT DESCENDED TO 2.0 RIVAS @ 1.75 PSI/MIN WITHOUT INCIDENT IN CHAMBER # 1. PT'S RESTING AT TX DEPTH WITH WOUND OFFLOADED. CHEST RISE AND FALL OBSERVED CHAMBERSIDE.\par \par PT TO RECEIVE WOUND CARE POST HBOT.\par \par Pt ascended from 2.0 RIVAS @ 1.75 PSI/min without incident. \par Pt tolerated tx well. Pt left without receiving wound care. \par

## 2020-04-27 NOTE — PROCEDURE
[Outpatient] : Outpatient [Wheelchair] : wheelchair [THIS CHAMBER HAS BEEN CLEANED / DISINFECTED] : This chamber has been cleaned / disinfected according to local and hospital policy and procedure prior to this treatment. [Patient educated on the risks of SMOKING prior to HBOT with understanding] : Patient educated on the risks of SMOKING prior to HBOT with understanding [Patient demonstrated and verbalized proper technique for using air break mask] : Patient demonstrated and verbalized proper technique for using air break mask [Patient educated on the risks of CONSUMING ALCOHOL prior to HBOT with understanding] : Patient educated on the risks of CONSUMING ALCOHOL prior to HBOT with understanding [100% Cotton] : 100% cotton [Empty all pockets] : empty all pockets [No hair oils, wigs, hairpieces, pins] : no hair oils, wigs, hairpieces, pins  [Pre tx medications] : pre tx medications  [No jewelry] : no jewelry  [No make-up, creams] : no make-up, creams  [No matches, cigarettes, lighters] : no matches, cigarettes, lighters  [Dentures removed] : dentures removed [Hearing aid removed] : hearing aid removed [Ground bracelet on pt's wrist] : ground bracelet on pt's wrist  [Contacts removed] : contacts removed  [No contraindicated dressings] : no contraindicated dressings [Remove nail polish] : remove nail polish  [No reading material] : no reading material  [Bra, undergarments removed] : bra, undergarments removed  [Ground Continuity - Verified < 1 ohm w/ Wrist Strap Barb] : Ground Continuity - Verified < 1 ohm w/ Wrist Strap Barb [Ground Wire - VISUAL Verification - Intact/Free of Obstruction] : Ground Wire - VISUAL Verification - Intact/Free of Obstruction  [Number: ___] : Number: [unfilled] [Diagnosis: ___] : Diagnosis: [unfilled] [____] : Post-Dive: Time - [unfilled] [___] : Post-Dive: Value - [unfilled] mg/dL [Clear all fields] : clear all fields [FreeTextEntry3] : 90 [] : No [FreeTextEntry5] : 9452 [FreeTextEntry7] : 9313 [FreeTextEntry9] : 9427 [de-identified] : 0985 [de-identified] : 108 min

## 2020-04-27 NOTE — ASSESSMENT
[No change from previous assessment] : No change from previous assessment [Time MD/Provider assessed Patient:_______] : Time MD/Provider assessed Patient: [unfilled] [Patient prepared for dive] : Patient prepared for dive [Patient undergoing HBO treatment for __________] : Patient undergoing HBO treatment for [unfilled] [Patient descended without problem for 9 minutes] : Patient descended without problem for 9 minutes [No dizziness or thirst] :  No dizziness or thirst [No ear problems] : No ear problems [Vital signs stable] : Vital signs stable [No Chest Pain, shortness of breath] : No Chest Pain, shortness of breath [Tolerating dive well] : Tolerating dive well [Respiratory Rate Stable] : Respiratory Rate Stable [No chest pain, shortness of breath, or ear pain] :  No chest pain, shortness of breath, or ear pain  [Tolerated Ascent well] : Tolerated Ascent well [Vital Signs stable] : Vital Signs stable [A physician was present throughout the entire HBOT] : A physician was present throughout the entire HBOT [No] : No [Continue Treatment Plan] : Continue treatment plan [Clinically Stable] : Clinically stable

## 2020-04-28 ENCOUNTER — APPOINTMENT (OUTPATIENT)
Dept: HYPERBARIC MEDICINE | Facility: HOSPITAL | Age: 45
End: 2020-04-28
Payer: COMMERCIAL

## 2020-04-28 ENCOUNTER — OUTPATIENT (OUTPATIENT)
Dept: OUTPATIENT SERVICES | Facility: HOSPITAL | Age: 45
LOS: 1 days | Discharge: HOME | End: 2020-04-28
Payer: COMMERCIAL

## 2020-04-28 VITALS
OXYGEN SATURATION: 100 % | RESPIRATION RATE: 18 BRPM | TEMPERATURE: 98.7 F | SYSTOLIC BLOOD PRESSURE: 159 MMHG | HEART RATE: 77 BPM | DIASTOLIC BLOOD PRESSURE: 83 MMHG

## 2020-04-28 VITALS
HEART RATE: 83 BPM | DIASTOLIC BLOOD PRESSURE: 67 MMHG | TEMPERATURE: 98.7 F | RESPIRATION RATE: 18 BRPM | SYSTOLIC BLOOD PRESSURE: 113 MMHG | OXYGEN SATURATION: 96 %

## 2020-04-28 DIAGNOSIS — L97.422 NON-PRESSURE CHRONIC ULCER OF LEFT HEEL AND MIDFOOT WITH FAT LAYER EXPOSED: ICD-10-CM

## 2020-04-28 DIAGNOSIS — Z79.4 LONG TERM (CURRENT) USE OF INSULIN: ICD-10-CM

## 2020-04-28 DIAGNOSIS — E11.621 TYPE 2 DIABETES MELLITUS WITH FOOT ULCER: ICD-10-CM

## 2020-04-28 DIAGNOSIS — E10.621 TYPE 1 DIABETES MELLITUS WITH FOOT ULCER: ICD-10-CM

## 2020-04-28 DIAGNOSIS — I77.0 ARTERIOVENOUS FISTULA, ACQUIRED: Chronic | ICD-10-CM

## 2020-04-28 PROCEDURE — 99183 HYPERBARIC OXYGEN THERAPY: CPT

## 2020-04-28 PROCEDURE — 82962 GLUCOSE BLOOD TEST: CPT

## 2020-04-28 PROCEDURE — G0277: CPT

## 2020-04-28 NOTE — PROCEDURE
[Outpatient] : Outpatient [Wheelchair] : wheelchair [THIS CHAMBER HAS BEEN CLEANED / DISINFECTED] : This chamber has been cleaned / disinfected according to local and hospital policy and procedure prior to this treatment. [Patient demonstrated and verbalized proper technique for using air break mask] : Patient demonstrated and verbalized proper technique for using air break mask [Patient educated on the risks of SMOKING prior to HBOT with understanding] : Patient educated on the risks of SMOKING prior to HBOT with understanding [Patient educated on the risks of CONSUMING ALCOHOL prior to HBOT with understanding] : Patient educated on the risks of CONSUMING ALCOHOL prior to HBOT with understanding [100% Cotton] : 100% cotton [Empty all pockets] : empty all pockets [No hair oils, wigs, hairpieces, pins] : no hair oils, wigs, hairpieces, pins  [No make-up, creams] : no make-up, creams  [Pre tx medications] : pre tx medications  [No jewelry] : no jewelry  [Hearing aid removed] : hearing aid removed [No matches, cigarettes, lighters] : no matches, cigarettes, lighters  [Dentures removed] : dentures removed [Contacts removed] : contacts removed  [Ground bracelet on pt's wrist] : ground bracelet on pt's wrist  [No reading material] : no reading material  [Bra, undergarments removed] : bra, undergarments removed  [Remove nail polish] : remove nail polish  [No contraindicated dressings] : no contraindicated dressings [Ground Wire - VISUAL Verification - Intact/Free of Obstruction] : Ground Wire - VISUAL Verification - Intact/Free of Obstruction  [Ground Continuity - Verified < 1 ohm w/ Wrist Strap Barb] : Ground Continuity - Verified < 1 ohm w/ Wrist Strap Barb [Number: ___] : Number: [unfilled] [Diagnosis: ___] : Diagnosis: [unfilled] [____] : Post-Dive: Time - [unfilled] [___] : Post-Dive: Value - [unfilled] mg/dL [Clear all fields] : clear all fields [] : No [FreeTextEntry1] : Tx Depth  [FreeTextEntry3] : 90 [FreeTextEntry5] : 4512 [FreeTextEntry7] : 0060 [FreeTextEntry9] : 2619 [de-identified] : 0920 [de-identified] : 108

## 2020-04-28 NOTE — ADDENDUM
[FreeTextEntry1] : Pt received wound care prior due to drainage.\par Pt BGL low.  Pt stated he didn't eat breakfast. RN notified. Pt given 16g of glucose via 8oz juice. Pt BGL retested.\par Pt BGL low. RN and MD notified. Pt given 16g of glucose via 8oz juice. Pt BGL retested.\par Pt BGL low. RN notified. Pt given 15g of glucose via 1 tube of oral glucose gel. Pt BGL retested.\par Pt vital signs wihtin parameters for HBO tx. \par Pt's heels offloaded using wedge. \par \par PT DESCENDED TO 2.0 RIVAS @ 1.75 PSI/MIN WITHOUT INCIDENT IN CHAMBER # 1 .\par PT'S RESTING AT TX DEPTH WITH WOUNDS OFFLOADED. CHEST RISE AND FALL OBSERVED CHAMBERSIDE.\par \par Pt ascended from 2.0 RIVAS @ 1.75 PSI/min without incident. \par Pt tolerated tx well.\par \par

## 2020-04-28 NOTE — ASSESSMENT
[No change from previous assessment] : No change from previous assessment [No dizziness or thirst] :  No dizziness or thirst [No ear problems] : No ear problems [Vital signs stable] : Vital signs stable [Respiratory Rate Stable] : Respiratory Rate Stable [Tolerating dive well] : Tolerating dive well [No Chest Pain, shortness of breath] : No Chest Pain, shortness of breath [Tolerated Ascent well] : Tolerated Ascent well [A physician was present throughout the entire HBOT] : A physician was present throughout the entire HBOT [Vital Signs stable] : Vital Signs stable [No] : No [Continue Treatment Plan] : Continue treatment plan [Clinically Stable] : Clinically stable [0] : 0 out of 10

## 2020-04-28 NOTE — ADDENDUM
[FreeTextEntry1] : PT DESCENDED TO 2.0 RIVAS @ 1.75PSI/MIN WITHOUT INCIDENT IN CHAMBER # 1. PT'S RESTING AT TX DEPTH WITH WOUND OFFLOADED. CHEST RISE AND FALL OBSERVED CHAMBERSIDE.\par Pt ascended from depth without incident. \par Pt tolerated tx well. \par \par PT NOTIFIED OF ASSESSMENT TOMORROW 04/29/2020 WITH APPROPRIATE ARRIVAL TIME (0845AM)

## 2020-04-28 NOTE — PROCEDURE
[Outpatient] : Outpatient [Wheelchair] : wheelchair [THIS CHAMBER HAS BEEN CLEANED / DISINFECTED] : This chamber has been cleaned / disinfected according to local and hospital policy and procedure prior to this treatment. [Patient demonstrated and verbalized proper technique for using air break mask] : Patient demonstrated and verbalized proper technique for using air break mask [Patient educated on the risks of CONSUMING ALCOHOL prior to HBOT with understanding] : Patient educated on the risks of CONSUMING ALCOHOL prior to HBOT with understanding [Patient educated on the risks of SMOKING prior to HBOT with understanding] : Patient educated on the risks of SMOKING prior to HBOT with understanding [100% Cotton] : 100% cotton [Empty all pockets] : empty all pockets [No hair oils, wigs, hairpieces, pins] : no hair oils, wigs, hairpieces, pins  [Pre tx medications] : pre tx medications  [No make-up, creams] : no make-up, creams  [No matches, cigarettes, lighters] : no matches, cigarettes, lighters  [No jewelry] : no jewelry  [Hearing aid removed] : hearing aid removed [Dentures removed] : dentures removed [Ground bracelet on pt's wrist] : ground bracelet on pt's wrist  [Remove nail polish] : remove nail polish  [Contacts removed] : contacts removed  [Bra, undergarments removed] : bra, undergarments removed  [No reading material] : no reading material  [No contraindicated dressings] : no contraindicated dressings [Ground Wire - VISUAL Verification - Intact/Free of Obstruction] : Ground Wire - VISUAL Verification - Intact/Free of Obstruction  [Ground Continuity - Verified < 1 ohm w/ Wrist Strap Barb] : Ground Continuity - Verified < 1 ohm w/ Wrist Strap Barb [Number: ___] : Number: [unfilled] [Diagnosis: ___] : Diagnosis: [unfilled] [____] : Post-Dive: Time - [unfilled] [___] : Post-Dive: Value - [unfilled] mg/dL [Clear all fields] : clear all fields [] : No [FreeTextEntry3] : 90 [FreeTextEntry5] : 2855 [FreeTextEntry7] : 9517 [FreeTextEntry9] : 1033 [de-identified] : 103 [de-identified] : 108 min

## 2020-04-29 ENCOUNTER — APPOINTMENT (OUTPATIENT)
Dept: HYPERBARIC MEDICINE | Facility: HOSPITAL | Age: 45
End: 2020-04-29
Payer: COMMERCIAL

## 2020-04-29 ENCOUNTER — OUTPATIENT (OUTPATIENT)
Dept: OUTPATIENT SERVICES | Facility: HOSPITAL | Age: 45
LOS: 1 days | Discharge: HOME | End: 2020-04-29
Payer: COMMERCIAL

## 2020-04-29 VITALS
TEMPERATURE: 98.9 F | OXYGEN SATURATION: 98 % | DIASTOLIC BLOOD PRESSURE: 77 MMHG | HEART RATE: 86 BPM | RESPIRATION RATE: 19 BRPM | SYSTOLIC BLOOD PRESSURE: 136 MMHG

## 2020-04-29 DIAGNOSIS — I77.0 ARTERIOVENOUS FISTULA, ACQUIRED: Chronic | ICD-10-CM

## 2020-04-29 DIAGNOSIS — E11.621 TYPE 2 DIABETES MELLITUS WITH FOOT ULCER: ICD-10-CM

## 2020-04-29 PROCEDURE — 11043 DBRDMT MUSC&/FSCA 1ST 20/<: CPT

## 2020-04-29 RX ORDER — ASCORBIC ACID 500 MG
TABLET ORAL
Refills: 0 | Status: DISCONTINUED | COMMUNITY
End: 2020-04-29

## 2020-04-29 RX ORDER — UBIDECARENONE/VIT E ACET 100MG-5
CAPSULE ORAL
Refills: 0 | Status: ACTIVE | COMMUNITY

## 2020-04-29 NOTE — PHYSICAL EXAM
[0] : right 0 [1+] : left 1+ [Varicose Veins Of Lower Extremities] : bilaterally [No Rash or Lesion] : No rash or lesion [Ankle Swelling On The Right] : mild [Skin Ulcer] : ulcer [Alert] : alert [Oriented to Person] : oriented to person [Oriented to Place] : oriented to place [Oriented to Time] : oriented to time [Calm] : calm [4 x 4] : 4 x 4  [Abdominal Pad] : Abdominal Pad [Purpura] : no purpura  [Petechiae] : no petechiae [de-identified] : comfortable  [de-identified] : Diabetic small vessel and cardio vascular disease  [de-identified] : previous calcanectomy of the right heel , possible OM of the left heel , OM of the right pinkie finger  [de-identified] : Diabetic neuropathy , Dialysis  3x per week  [de-identified] : DFU 3 plantar posterior left and right heel down to skin, subcutaneous tissue, and fat [FreeTextEntry1] : Left posterior heel  [FreeTextEntry3] : 1.8 [FreeTextEntry4] : 0.3 [FreeTextEntry2] : 3.4 [de-identified] : Serous/sanguinous [de-identified] : Silver alginate [de-identified] : Hard callus  [FreeTextEntry8] : 1.1 [de-identified] : Cleansed with NS\par Kerlix \par \par * Post debridement measurements: 3.6/2/0.3\par \par Small amount of bleeding during procedure.\par \par  [FreeTextEntry7] : Right posterior heel [de-identified] : 0.3 [FreeTextEntry9] : 1.3 [de-identified] : Serous/sanguinous [de-identified] : Cleansed with NS\par Kerlix \par \par Post debridement measurement: 1.4/1.5/0.3 [de-identified] : Hard callus  [de-identified] : Silver alginate [de-identified] : Small amount of bleeding noted during procedure. No pain pre or post procedure noted. Patient tolerated well.  [TWNoteComboBox4] : Small [de-identified] : None [de-identified] : None [de-identified] : No [de-identified] : >75% [de-identified] : Primary Dressing [de-identified] : 3x Weekly [de-identified] : Small [de-identified] : None [de-identified] : >75% [de-identified] : None [de-identified] : Primary Dressing [de-identified] : 3x Weekly [de-identified] : No

## 2020-04-29 NOTE — ASSESSMENT
[Verbal] : Verbal [Written] : Written [Demo] : Demo [Good - alert, interested, motivated] : Good - alert, interested, motivated [Verbalizes knowledge/Understanding] : Verbalizes knowledge/understanding [Patient] : Patient [Dressing changes] : dressing changes [Foot Care] : foot care [Skin Care] : skin care [How and When to Call] : how and when to call [Signs and symptoms of infection] : sign and symptoms of infection [Pain Management] : pain management [Hyperbaric Therapy] : hyperbaric therapy [Off-loading] : off-loading [Patient responsibility to plan of care] : patient responsibility to plan of care [Glycemic Control] : glycemic control [Home] : Home [Stable] : stable [Not Applicable - Long Term Care/Home Health Agency] : Long Term Care/Home Health Agency: Not Applicable [Wheelchair] : Wheelchair [FreeTextEntry3] : Wound larger in size with hard callus.  [] : No [FreeTextEntry2] : Infection prevention \par Localized wound care \par Offloading\par Promote optimal nutrition \par Hyperbaric oxygen therapy  [FreeTextEntry4] : HBO 22/30 completed. \par Education reinforced to patient to keep off of wounds as much as possible. \par Continue HBO as ordered.\par Auth submitted for + 10 HBO = 40 total \par Debridement same day auth submitted. \par Assessment in 2 weeks

## 2020-04-29 NOTE — PROCEDURE
[Saline] : saline [Fibrotic] : fibrotic [Slough] : slough [Scalpel] : scalpel [Necrotic] : necrotic [Sharp scissors] : sharp scissors [Skin] : skin [Fat] : fat [Fascia] : fascia [Subcutaneous tissue] : subcutaneous tissue [Clean] : clean [Pressure] : pressure [Pink] : pink [AgNo3 Cauterization] : AgNo3 cauterization [FreeTextEntry2] : right and left heel  [] : No [FreeTextEntry1] : silver alginate [FreeTextEntry9] : 5949 [de-identified] : DFU 3 bilateral heels  [de-identified] : Chao [FreeTextEntry7] : same [FreeTextEntry6] : DFU 3 bilateral heels  [de-identified] : 8cc [de-identified] : necrotic skin, subcutaneous , fat , fascia

## 2020-04-29 NOTE — REVIEW OF SYSTEMS
[Arthralgias] : arthralgias [Joint Stiffness] : joint stiffness [Skin Wound] : skin wound [Fever] : no fever [Chills] : no chills [Shortness Of Breath] : no shortness of breath [Loss Of Hearing] : no hearing loss [Abdominal Pain] : no abdominal pain [Vomiting] : no vomiting [Anxiety] : no anxiety [FreeTextEntry5] : diabetic small vessel diseas and cardio vascular disease  [Easy Bleeding] : no tendency for easy bleeding [de-identified] : IDDM with neuropathy  [de-identified] : DFU 3 plantar posterior left and right heel down to skin, subcutaneous tissue, and fat , necrotic  [de-identified] : IDDM , patient on Dialysis

## 2020-04-30 ENCOUNTER — OUTPATIENT (OUTPATIENT)
Dept: OUTPATIENT SERVICES | Facility: HOSPITAL | Age: 45
LOS: 1 days | Discharge: HOME | End: 2020-04-30
Payer: COMMERCIAL

## 2020-04-30 ENCOUNTER — APPOINTMENT (OUTPATIENT)
Dept: HYPERBARIC MEDICINE | Facility: HOSPITAL | Age: 45
End: 2020-04-30
Payer: COMMERCIAL

## 2020-04-30 VITALS
OXYGEN SATURATION: 100 % | HEART RATE: 71 BPM | DIASTOLIC BLOOD PRESSURE: 86 MMHG | TEMPERATURE: 97 F | SYSTOLIC BLOOD PRESSURE: 178 MMHG | RESPIRATION RATE: 16 BRPM

## 2020-04-30 VITALS
TEMPERATURE: 97.1 F | SYSTOLIC BLOOD PRESSURE: 131 MMHG | DIASTOLIC BLOOD PRESSURE: 75 MMHG | RESPIRATION RATE: 18 BRPM | HEART RATE: 73 BPM | OXYGEN SATURATION: 18 %

## 2020-04-30 DIAGNOSIS — E10.621 TYPE 1 DIABETES MELLITUS WITH FOOT ULCER: ICD-10-CM

## 2020-04-30 DIAGNOSIS — Z79.4 LONG TERM (CURRENT) USE OF INSULIN: ICD-10-CM

## 2020-04-30 DIAGNOSIS — L97.422 NON-PRESSURE CHRONIC ULCER OF LEFT HEEL AND MIDFOOT WITH FAT LAYER EXPOSED: ICD-10-CM

## 2020-04-30 DIAGNOSIS — I77.0 ARTERIOVENOUS FISTULA, ACQUIRED: Chronic | ICD-10-CM

## 2020-04-30 DIAGNOSIS — E11.621 TYPE 2 DIABETES MELLITUS WITH FOOT ULCER: ICD-10-CM

## 2020-04-30 PROCEDURE — 82962 GLUCOSE BLOOD TEST: CPT

## 2020-04-30 PROCEDURE — 99183 HYPERBARIC OXYGEN THERAPY: CPT

## 2020-04-30 PROCEDURE — G0277: CPT

## 2020-04-30 NOTE — ADDENDUM
[FreeTextEntry1] : Pt drainage cleared by RN to be changed post HBO tx. \par Pt's feet offloaded using wedge. \par Pt descended to 2.0 RIVAS @ 1.75 PSI/min without incident in chamber #1.\par Pt resting @ depth with chest rise and fall observed throughout tx.\par Pt ascended from 2.0 RIVAS@ 1.75psi/min without incident. \par Pt tolerated tx well.

## 2020-04-30 NOTE — ASSESSMENT
[No change from previous assessment] : No change from previous assessment [Time MD/Provider assessed Patient:_______] : Time MD/Provider assessed Patient: [unfilled] [Patient prepared for dive] : Patient prepared for dive [Patient undergoing HBO treatment for __________] : Patient undergoing HBO treatment for [unfilled] [No dizziness or thirst] :  No dizziness or thirst [Patient descended without problem for 9 minutes] : Patient descended without problem for 9 minutes [No ear problems] : No ear problems [Vital signs stable] : Vital signs stable [Tolerating dive well] : Tolerating dive well [No chest pain, shortness of breath, or ear pain] :  No chest pain, shortness of breath, or ear pain  [Respiratory Rate Stable] : Respiratory Rate Stable [No Chest Pain, shortness of breath] : No Chest Pain, shortness of breath [Tolerated Ascent well] : Tolerated Ascent well [Vital Signs stable] : Vital Signs stable [A physician was present throughout the entire HBOT] : A physician was present throughout the entire HBOT [No] : No [Clinically Stable] : Clinically stable [Continue Treatment Plan] : Continue treatment plan

## 2020-04-30 NOTE — PROCEDURE
[Outpatient] : Outpatient [Wheelchair] : wheelchair [THIS CHAMBER HAS BEEN CLEANED / DISINFECTED] : This chamber has been cleaned / disinfected according to local and hospital policy and procedure prior to this treatment. [Patient demonstrated and verbalized proper technique for using air break mask] : Patient demonstrated and verbalized proper technique for using air break mask [Patient educated on the risks of SMOKING prior to HBOT with understanding] : Patient educated on the risks of SMOKING prior to HBOT with understanding [Patient educated on the risks of CONSUMING ALCOHOL prior to HBOT with understanding] : Patient educated on the risks of CONSUMING ALCOHOL prior to HBOT with understanding [100% Cotton] : 100% cotton [Empty all pockets] : empty all pockets [No hair oils, wigs, hairpieces, pins] : no hair oils, wigs, hairpieces, pins  [Pre tx medications] : pre tx medications  [No make-up, creams] : no make-up, creams  [No jewelry] : no jewelry  [No matches, cigarettes, lighters] : no matches, cigarettes, lighters  [Hearing aid removed] : hearing aid removed [Dentures removed] : dentures removed [Ground bracelet on pt's wrist] : ground bracelet on pt's wrist  [Contacts removed] : contacts removed  [Remove nail polish] : remove nail polish  [No reading material] : no reading material  [Bra, undergarments removed] : bra, undergarments removed  [No contraindicated dressings] : no contraindicated dressings [Ground Wire - VISUAL Verification - Intact/Free of Obstruction] : Ground Wire - VISUAL Verification - Intact/Free of Obstruction  [Ground Continuity - Verified < 1 ohm w/ Wrist Strap Barb] : Ground Continuity - Verified < 1 ohm w/ Wrist Strap Barb [Number: ___] : Number: [unfilled] [Diagnosis: ___] : Diagnosis: [unfilled] [____] : Post-Dive: Time - [unfilled] [___] : Post-Dive: Value - [unfilled] mg/dL [Clear all fields] : clear all fields [] : No [FreeTextEntry5] : 0527 [FreeTextEntry3] : 90 mins [FreeTextEntry1] : 2.0 [FreeTextEntry9] : 0863 [de-identified] : 0945 [FreeTextEntry7] : 1725 [de-identified] : 108 mins

## 2020-05-01 ENCOUNTER — APPOINTMENT (OUTPATIENT)
Dept: HYPERBARIC MEDICINE | Facility: HOSPITAL | Age: 45
End: 2020-05-01
Payer: COMMERCIAL

## 2020-05-01 ENCOUNTER — OUTPATIENT (OUTPATIENT)
Dept: OUTPATIENT SERVICES | Facility: HOSPITAL | Age: 45
LOS: 1 days | Discharge: HOME | End: 2020-05-01
Payer: COMMERCIAL

## 2020-05-01 VITALS
RESPIRATION RATE: 20 BRPM | DIASTOLIC BLOOD PRESSURE: 75 MMHG | SYSTOLIC BLOOD PRESSURE: 145 MMHG | OXYGEN SATURATION: 100 % | HEART RATE: 80 BPM | TEMPERATURE: 98.6 F

## 2020-05-01 VITALS
HEART RATE: 106 BPM | TEMPERATURE: 98.3 F | RESPIRATION RATE: 20 BRPM | DIASTOLIC BLOOD PRESSURE: 94 MMHG | SYSTOLIC BLOOD PRESSURE: 193 MMHG | OXYGEN SATURATION: 100 %

## 2020-05-01 DIAGNOSIS — I77.0 ARTERIOVENOUS FISTULA, ACQUIRED: Chronic | ICD-10-CM

## 2020-05-01 DIAGNOSIS — Z79.4 LONG TERM (CURRENT) USE OF INSULIN: ICD-10-CM

## 2020-05-01 DIAGNOSIS — L97.422 NON-PRESSURE CHRONIC ULCER OF LEFT HEEL AND MIDFOOT WITH FAT LAYER EXPOSED: ICD-10-CM

## 2020-05-01 DIAGNOSIS — E10.621 TYPE 1 DIABETES MELLITUS WITH FOOT ULCER: ICD-10-CM

## 2020-05-01 DIAGNOSIS — E11.621 TYPE 2 DIABETES MELLITUS WITH FOOT ULCER: ICD-10-CM

## 2020-05-01 PROCEDURE — G0277: CPT

## 2020-05-01 PROCEDURE — 82962 GLUCOSE BLOOD TEST: CPT

## 2020-05-01 PROCEDURE — 99183 HYPERBARIC OXYGEN THERAPY: CPT

## 2020-05-02 DIAGNOSIS — Z79.899 OTHER LONG TERM (CURRENT) DRUG THERAPY: ICD-10-CM

## 2020-05-02 DIAGNOSIS — Z89.422 ACQUIRED ABSENCE OF OTHER LEFT TOE(S): ICD-10-CM

## 2020-05-02 DIAGNOSIS — E10.621 TYPE 1 DIABETES MELLITUS WITH FOOT ULCER: ICD-10-CM

## 2020-05-02 DIAGNOSIS — N19 UNSPECIFIED KIDNEY FAILURE: ICD-10-CM

## 2020-05-02 DIAGNOSIS — Z98.49 CATARACT EXTRACTION STATUS, UNSPECIFIED EYE: ICD-10-CM

## 2020-05-02 DIAGNOSIS — I83.93 ASYMPTOMATIC VARICOSE VEINS OF BILATERAL LOWER EXTREMITIES: ICD-10-CM

## 2020-05-02 DIAGNOSIS — Z79.4 LONG TERM (CURRENT) USE OF INSULIN: ICD-10-CM

## 2020-05-02 DIAGNOSIS — E10.29 TYPE 1 DIABETES MELLITUS WITH OTHER DIABETIC KIDNEY COMPLICATION: ICD-10-CM

## 2020-05-02 DIAGNOSIS — L97.423 NON-PRESSURE CHRONIC ULCER OF LEFT HEEL AND MIDFOOT WITH NECROSIS OF MUSCLE: ICD-10-CM

## 2020-05-02 DIAGNOSIS — Z99.2 DEPENDENCE ON RENAL DIALYSIS: ICD-10-CM

## 2020-05-02 DIAGNOSIS — E10.40 TYPE 1 DIABETES MELLITUS WITH DIABETIC NEUROPATHY, UNSPECIFIED: ICD-10-CM

## 2020-05-04 ENCOUNTER — APPOINTMENT (OUTPATIENT)
Dept: HYPERBARIC MEDICINE | Facility: HOSPITAL | Age: 45
End: 2020-05-04
Payer: COMMERCIAL

## 2020-05-04 ENCOUNTER — OUTPATIENT (OUTPATIENT)
Dept: OUTPATIENT SERVICES | Facility: HOSPITAL | Age: 45
LOS: 1 days | Discharge: HOME | End: 2020-05-04
Payer: COMMERCIAL

## 2020-05-04 VITALS
DIASTOLIC BLOOD PRESSURE: 76 MMHG | RESPIRATION RATE: 16 BRPM | HEART RATE: 82 BPM | SYSTOLIC BLOOD PRESSURE: 144 MMHG | TEMPERATURE: 98.7 F | OXYGEN SATURATION: 100 %

## 2020-05-04 VITALS
TEMPERATURE: 97.1 F | SYSTOLIC BLOOD PRESSURE: 179 MMHG | HEART RATE: 76 BPM | RESPIRATION RATE: 16 BRPM | DIASTOLIC BLOOD PRESSURE: 83 MMHG | OXYGEN SATURATION: 98 %

## 2020-05-04 DIAGNOSIS — E10.621 TYPE 1 DIABETES MELLITUS WITH FOOT ULCER: ICD-10-CM

## 2020-05-04 DIAGNOSIS — E11.621 TYPE 2 DIABETES MELLITUS WITH FOOT ULCER: ICD-10-CM

## 2020-05-04 DIAGNOSIS — L97.422 NON-PRESSURE CHRONIC ULCER OF LEFT HEEL AND MIDFOOT WITH FAT LAYER EXPOSED: ICD-10-CM

## 2020-05-04 DIAGNOSIS — I77.0 ARTERIOVENOUS FISTULA, ACQUIRED: Chronic | ICD-10-CM

## 2020-05-04 DIAGNOSIS — Z79.4 LONG TERM (CURRENT) USE OF INSULIN: ICD-10-CM

## 2020-05-04 PROCEDURE — G0277: CPT

## 2020-05-04 PROCEDURE — 99183 HYPERBARIC OXYGEN THERAPY: CPT

## 2020-05-04 PROCEDURE — 82962 GLUCOSE BLOOD TEST: CPT

## 2020-05-04 NOTE — ADDENDUM
[FreeTextEntry1] : Pt received WC by LPN prior tx. \par Pt BGL was low did it immediate retest on left hand finger. pt BGL still low. LPN notified. Pt given 16g of glucose via 8oz juice Pt BGL retested. Pt BGL still low. LPN notified. Pt given Pt given 16g of  glucose via 8oz juice.  Pt BGL retested and Now within acceptable limits for HBOT tx. \par Pt descended to 2.0 RIVAS @ 1.75 PSI/min without incident in chamber # 1. \par Pt resting @ depth with chest rise and fall observed by chamber side. \par Pt ascended from 2.0 RIVAS @ 1.75 PSI/min without incident. \par Pt tolerated tx well.\par \par

## 2020-05-04 NOTE — ASSESSMENT
[Patient descended without problem for 9 minutes] : Patient descended without problem for 9 minutes [Patient undergoing HBO treatment for __________] : Patient undergoing HBO treatment for [unfilled] [No ear problems] : No ear problems [Vital signs stable] : Vital signs stable [No dizziness or thirst] :  No dizziness or thirst [Tolerating dive well] : Tolerating dive well [No Chest Pain, shortness of breath] : No Chest Pain, shortness of breath [Respiratory Rate Stable] : Respiratory Rate Stable [No chest pain, shortness of breath, or ear pain] :  No chest pain, shortness of breath, or ear pain  [Vital Signs stable] : Vital Signs stable [Tolerated Ascent well] : Tolerated Ascent well [No] : No [A physician was present throughout the entire HBOT] : A physician was present throughout the entire HBOT [Clinically Stable] : Clinically stable [Continue Treatment Plan] : Continue treatment plan [0] : 0 out of 10

## 2020-05-04 NOTE — ASSESSMENT
[Time MD/Provider assessed Patient:_______] : Time MD/Provider assessed Patient: [unfilled] [No change from previous assessment] : No change from previous assessment [Patient prepared for dive] : Patient prepared for dive [Patient undergoing HBO treatment for __________] : Patient undergoing HBO treatment for [unfilled] [Patient descended without problem for 9 minutes] : Patient descended without problem for 9 minutes [No ear problems] : No ear problems [No dizziness or thirst] :  No dizziness or thirst [Vital signs stable] : Vital signs stable [Tolerating dive well] : Tolerating dive well [Respiratory Rate Stable] : Respiratory Rate Stable [No chest pain, shortness of breath, or ear pain] :  No chest pain, shortness of breath, or ear pain  [No Chest Pain, shortness of breath] : No Chest Pain, shortness of breath [Vital Signs stable] : Vital Signs stable [Tolerated Ascent well] : Tolerated Ascent well [A physician was present throughout the entire HBOT] : A physician was present throughout the entire HBOT [No] : No [Clinically Stable] : Clinically stable [Continue Treatment Plan] : Continue treatment plan

## 2020-05-04 NOTE — PROCEDURE
[Outpatient] : Outpatient [Ambulatory] : Patient is ambulatory. [THIS CHAMBER HAS BEEN CLEANED / DISINFECTED] : This chamber has been cleaned / disinfected according to local and hospital policy and procedure prior to this treatment. [Patient demonstrated and verbalized proper technique for using air break mask] : Patient demonstrated and verbalized proper technique for using air break mask [Patient educated on the risks of CONSUMING ALCOHOL prior to HBOT with understanding] : Patient educated on the risks of CONSUMING ALCOHOL prior to HBOT with understanding [Patient educated on the risks of SMOKING prior to HBOT with understanding] : Patient educated on the risks of SMOKING prior to HBOT with understanding [100% Cotton] : 100% cotton [Empty all pockets] : empty all pockets [No hair oils, wigs, hairpieces, pins] : no hair oils, wigs, hairpieces, pins  [Pre tx medications] : pre tx medications  [No jewelry] : no jewelry  [No make-up, creams] : no make-up, creams  [No matches, cigarettes, lighters] : no matches, cigarettes, lighters  [Hearing aid removed] : hearing aid removed [Dentures removed] : dentures removed [Ground bracelet on pt's wrist] : ground bracelet on pt's wrist  [Remove nail polish] : remove nail polish  [Contacts removed] : contacts removed  [No reading material] : no reading material  [No contraindicated dressings] : no contraindicated dressings [Bra, undergarments removed] : bra, undergarments removed  [Ground Wire - VISUAL Verification - Intact/Free of Obstruction] : Ground Wire - VISUAL Verification - Intact/Free of Obstruction  [Ground Continuity - Verified < 1 ohm w/ Wrist Strap Barb] : Ground Continuity - Verified < 1 ohm w/ Wrist Strap Barb [Number: ___] : Number: [unfilled] [Diagnosis: ___] : Diagnosis: [unfilled] [____] : Post-Dive: Time - [unfilled] [___] : Post-Dive: Value - [unfilled] mg/dL [Clear all fields] : clear all fields [FreeTextEntry1] : 2.0 [] : No [FreeTextEntry3] : 90 mins [FreeTextEntry5] : 4507 [de-identified] : 1021 [FreeTextEntry7] : 8912 [FreeTextEntry9] : 1025 [de-identified] : 108 mins

## 2020-05-04 NOTE — ADDENDUM
[FreeTextEntry1] : Pt descended to depth without incident in chamber # 1\par Pt is resting @ depth with chest rise and fall observed @ chamber side.\par Pt ascended from depth without incident. \par Pt tolerated tx well\par Pt receive wound care post HBOT by LPN.\par

## 2020-05-04 NOTE — PROCEDURE
[Outpatient] : Outpatient [Wheelchair] : wheelchair [THIS CHAMBER HAS BEEN CLEANED / DISINFECTED] : This chamber has been cleaned / disinfected according to local and hospital policy and procedure prior to this treatment. [Patient demonstrated and verbalized proper technique for using air break mask] : Patient demonstrated and verbalized proper technique for using air break mask [Patient educated on the risks of SMOKING prior to HBOT with understanding] : Patient educated on the risks of SMOKING prior to HBOT with understanding [Patient educated on the risks of CONSUMING ALCOHOL prior to HBOT with understanding] : Patient educated on the risks of CONSUMING ALCOHOL prior to HBOT with understanding [100% Cotton] : 100% cotton [Empty all pockets] : empty all pockets [No hair oils, wigs, hairpieces, pins] : no hair oils, wigs, hairpieces, pins  [Pre tx medications] : pre tx medications  [No make-up, creams] : no make-up, creams  [No matches, cigarettes, lighters] : no matches, cigarettes, lighters  [No jewelry] : no jewelry  [Dentures removed] : dentures removed [Hearing aid removed] : hearing aid removed [Ground bracelet on pt's wrist] : ground bracelet on pt's wrist  [Contacts removed] : contacts removed  [Remove nail polish] : remove nail polish  [No reading material] : no reading material  [Bra, undergarments removed] : bra, undergarments removed  [No contraindicated dressings] : no contraindicated dressings [Ground Wire - VISUAL Verification - Intact/Free of Obstruction] : Ground Wire - VISUAL Verification - Intact/Free of Obstruction  [Ground Continuity - Verified < 1 ohm w/ Wrist Strap Barb] : Ground Continuity - Verified < 1 ohm w/ Wrist Strap Barb [Number: ___] : Number: [unfilled] [Diagnosis: ___] : Diagnosis: [unfilled] [____] : Post-Dive: Time - [unfilled] [___] : Post-Dive: Value - [unfilled] mg/dL [Clear all fields] : clear all fields [] : No [FreeTextEntry3] : 90 [FreeTextEntry1] : TX DEPTH  [FreeTextEntry5] : 08 04 [FreeTextEntry7] : 08 13 [de-identified] : 09 52 [FreeTextEntry9] : 09 43 [de-identified] : 108 min

## 2020-05-05 ENCOUNTER — APPOINTMENT (OUTPATIENT)
Dept: HYPERBARIC MEDICINE | Facility: HOSPITAL | Age: 45
End: 2020-05-05
Payer: COMMERCIAL

## 2020-05-05 ENCOUNTER — OUTPATIENT (OUTPATIENT)
Dept: OUTPATIENT SERVICES | Facility: HOSPITAL | Age: 45
LOS: 1 days | Discharge: HOME | End: 2020-05-05
Payer: COMMERCIAL

## 2020-05-05 VITALS
TEMPERATURE: 97.3 F | OXYGEN SATURATION: 100 % | HEART RATE: 71 BPM | SYSTOLIC BLOOD PRESSURE: 146 MMHG | RESPIRATION RATE: 16 BRPM | DIASTOLIC BLOOD PRESSURE: 80 MMHG

## 2020-05-05 VITALS
DIASTOLIC BLOOD PRESSURE: 69 MMHG | HEART RATE: 72 BPM | RESPIRATION RATE: 16 BRPM | OXYGEN SATURATION: 100 % | SYSTOLIC BLOOD PRESSURE: 109 MMHG | TEMPERATURE: 97.7 F

## 2020-05-05 DIAGNOSIS — Z79.4 LONG TERM (CURRENT) USE OF INSULIN: ICD-10-CM

## 2020-05-05 DIAGNOSIS — L97.422 NON-PRESSURE CHRONIC ULCER OF LEFT HEEL AND MIDFOOT WITH FAT LAYER EXPOSED: ICD-10-CM

## 2020-05-05 DIAGNOSIS — E11.621 TYPE 2 DIABETES MELLITUS WITH FOOT ULCER: ICD-10-CM

## 2020-05-05 DIAGNOSIS — E10.621 TYPE 1 DIABETES MELLITUS WITH FOOT ULCER: ICD-10-CM

## 2020-05-05 DIAGNOSIS — I77.0 ARTERIOVENOUS FISTULA, ACQUIRED: Chronic | ICD-10-CM

## 2020-05-05 PROCEDURE — 82962 GLUCOSE BLOOD TEST: CPT

## 2020-05-05 PROCEDURE — G0277: CPT

## 2020-05-05 PROCEDURE — 99183 HYPERBARIC OXYGEN THERAPY: CPT

## 2020-05-05 NOTE — ADDENDUM
[FreeTextEntry1] : DRAINAGE NOTED ON PT'S DRESSING PRIOR TO DESCENT. CHRN NOTIFIED. PT TO RECEIVE WOUND CARE POST HBOT.\par \par PT DESCENDED TO 2.0 RIVAS @ 1.75 PSI/MIN WITHOUT INCIDENT IN CHAMBER # 1. PT'S RESTING AT TX DEPTH WITH WOUNDS OFFLOADED. CHEST RISE AND FALL OBSERVED CHAMBERSIDE.\par \par PT ASCENDED FROM 2.0 RIVAS @ 1.75 PSI/MIN WITHOUT INCIDENT. PT TOLERATED TX WELL.

## 2020-05-05 NOTE — PROCEDURE
[Outpatient] : Outpatient [Wheelchair] : wheelchair [THIS CHAMBER HAS BEEN CLEANED / DISINFECTED] : This chamber has been cleaned / disinfected according to local and hospital policy and procedure prior to this treatment. [Patient demonstrated and verbalized proper technique for using air break mask] : Patient demonstrated and verbalized proper technique for using air break mask [Patient educated on the risks of SMOKING prior to HBOT with understanding] : Patient educated on the risks of SMOKING prior to HBOT with understanding [Patient educated on the risks of CONSUMING ALCOHOL prior to HBOT with understanding] : Patient educated on the risks of CONSUMING ALCOHOL prior to HBOT with understanding [Empty all pockets] : empty all pockets [100% Cotton] : 100% cotton [No hair oils, wigs, hairpieces, pins] : no hair oils, wigs, hairpieces, pins  [Pre tx medications] : pre tx medications  [No jewelry] : no jewelry  [No make-up, creams] : no make-up, creams  [Hearing aid removed] : hearing aid removed [No matches, cigarettes, lighters] : no matches, cigarettes, lighters  [Dentures removed] : dentures removed [Ground bracelet on pt's wrist] : ground bracelet on pt's wrist  [Remove nail polish] : remove nail polish  [Contacts removed] : contacts removed  [No reading material] : no reading material  [Bra, undergarments removed] : bra, undergarments removed  [No contraindicated dressings] : no contraindicated dressings [Ground Wire - VISUAL Verification - Intact/Free of Obstruction] : Ground Wire - VISUAL Verification - Intact/Free of Obstruction  [Ground Continuity - Verified < 1 ohm w/ Wrist Strap Barb] : Ground Continuity - Verified < 1 ohm w/ Wrist Strap Barb [Number: ___] : Number: [unfilled] [Diagnosis: ___] : Diagnosis: [unfilled] [____] : Post-Dive: Time - [unfilled] [___] : Post-Dive: Value - [unfilled] mg/dL [Clear all fields] : clear all fields [] : No [FreeTextEntry3] : 90 [FreeTextEntry7] : 9834 [FreeTextEntry5] : 8896 [FreeTextEntry9] : 4278 [de-identified] : 108 MIN [de-identified] : 0983

## 2020-05-05 NOTE — ASSESSMENT
[No change from previous assessment] : No change from previous assessment [Patient descended without problem for 9 minutes] : Patient descended without problem for 9 minutes [No ear problems] : No ear problems [No dizziness or thirst] :  No dizziness or thirst [Vital signs stable] : Vital signs stable [Tolerating dive well] : Tolerating dive well [No Chest Pain, shortness of breath] : No Chest Pain, shortness of breath [Respiratory Rate Stable] : Respiratory Rate Stable [Tolerated Ascent well] : Tolerated Ascent well [No chest pain, shortness of breath, or ear pain] :  No chest pain, shortness of breath, or ear pain  [Vital Signs stable] : Vital Signs stable [A physician was present throughout the entire HBOT] : A physician was present throughout the entire HBOT [No] : No [Clinically Stable] : Clinically stable [Continue Treatment Plan] : Continue treatment plan [0] : 0 out of 10

## 2020-05-06 ENCOUNTER — APPOINTMENT (OUTPATIENT)
Dept: HYPERBARIC MEDICINE | Facility: HOSPITAL | Age: 45
End: 2020-05-06
Payer: COMMERCIAL

## 2020-05-06 ENCOUNTER — OUTPATIENT (OUTPATIENT)
Dept: OUTPATIENT SERVICES | Facility: HOSPITAL | Age: 45
LOS: 1 days | Discharge: HOME | End: 2020-05-06
Payer: COMMERCIAL

## 2020-05-06 VITALS
RESPIRATION RATE: 16 BRPM | DIASTOLIC BLOOD PRESSURE: 68 MMHG | OXYGEN SATURATION: 100 % | TEMPERATURE: 98.1 F | HEART RATE: 78 BPM | SYSTOLIC BLOOD PRESSURE: 123 MMHG

## 2020-05-06 VITALS
HEART RATE: 70 BPM | OXYGEN SATURATION: 99 % | RESPIRATION RATE: 16 BRPM | TEMPERATURE: 97.2 F | SYSTOLIC BLOOD PRESSURE: 185 MMHG | DIASTOLIC BLOOD PRESSURE: 86 MMHG

## 2020-05-06 DIAGNOSIS — E10.621 TYPE 1 DIABETES MELLITUS WITH FOOT ULCER: ICD-10-CM

## 2020-05-06 DIAGNOSIS — E11.621 TYPE 2 DIABETES MELLITUS WITH FOOT ULCER: ICD-10-CM

## 2020-05-06 DIAGNOSIS — Z79.4 LONG TERM (CURRENT) USE OF INSULIN: ICD-10-CM

## 2020-05-06 DIAGNOSIS — L97.422 NON-PRESSURE CHRONIC ULCER OF LEFT HEEL AND MIDFOOT WITH FAT LAYER EXPOSED: ICD-10-CM

## 2020-05-06 DIAGNOSIS — I77.0 ARTERIOVENOUS FISTULA, ACQUIRED: Chronic | ICD-10-CM

## 2020-05-06 PROCEDURE — 99183 HYPERBARIC OXYGEN THERAPY: CPT

## 2020-05-06 PROCEDURE — G0277: CPT

## 2020-05-06 PROCEDURE — 82962 GLUCOSE BLOOD TEST: CPT

## 2020-05-06 NOTE — ASSESSMENT
[No change from previous assessment] : No change from previous assessment [No dizziness or thirst] :  No dizziness or thirst [No ear problems] : No ear problems [Vital signs stable] : Vital signs stable [Tolerating dive well] : Tolerating dive well [No Chest Pain, shortness of breath] : No Chest Pain, shortness of breath [Respiratory Rate Stable] : Respiratory Rate Stable [No chest pain, shortness of breath, or ear pain] :  No chest pain, shortness of breath, or ear pain  [Tolerated Ascent well] : Tolerated Ascent well [Vital Signs stable] : Vital Signs stable [A physician was present throughout the entire HBOT] : A physician was present throughout the entire HBOT [No] : No [Clinically Stable] : Clinically stable [Continue Treatment Plan] : Continue treatment plan

## 2020-05-07 ENCOUNTER — APPOINTMENT (OUTPATIENT)
Dept: HYPERBARIC MEDICINE | Facility: HOSPITAL | Age: 45
End: 2020-05-07
Payer: COMMERCIAL

## 2020-05-07 ENCOUNTER — OUTPATIENT (OUTPATIENT)
Dept: OUTPATIENT SERVICES | Facility: HOSPITAL | Age: 45
LOS: 1 days | Discharge: HOME | End: 2020-05-07
Payer: COMMERCIAL

## 2020-05-07 VITALS
DIASTOLIC BLOOD PRESSURE: 69 MMHG | SYSTOLIC BLOOD PRESSURE: 113 MMHG | OXYGEN SATURATION: 100 % | RESPIRATION RATE: 16 BRPM | HEART RATE: 78 BPM | TEMPERATURE: 96.9 F

## 2020-05-07 VITALS
RESPIRATION RATE: 16 BRPM | HEART RATE: 69 BPM | TEMPERATURE: 97 F | OXYGEN SATURATION: 100 % | SYSTOLIC BLOOD PRESSURE: 178 MMHG | DIASTOLIC BLOOD PRESSURE: 88 MMHG

## 2020-05-07 DIAGNOSIS — I77.0 ARTERIOVENOUS FISTULA, ACQUIRED: Chronic | ICD-10-CM

## 2020-05-07 DIAGNOSIS — E10.621 TYPE 1 DIABETES MELLITUS WITH FOOT ULCER: ICD-10-CM

## 2020-05-07 DIAGNOSIS — L97.422 NON-PRESSURE CHRONIC ULCER OF LEFT HEEL AND MIDFOOT WITH FAT LAYER EXPOSED: ICD-10-CM

## 2020-05-07 DIAGNOSIS — Z79.4 LONG TERM (CURRENT) USE OF INSULIN: ICD-10-CM

## 2020-05-07 DIAGNOSIS — E11.621 TYPE 2 DIABETES MELLITUS WITH FOOT ULCER: ICD-10-CM

## 2020-05-07 PROCEDURE — G0277: CPT

## 2020-05-07 PROCEDURE — 99183 HYPERBARIC OXYGEN THERAPY: CPT

## 2020-05-07 PROCEDURE — 82962 GLUCOSE BLOOD TEST: CPT

## 2020-05-07 NOTE — PROCEDURE
[Ambulatory] : Patient is ambulatory. [Outpatient] : Outpatient [THIS CHAMBER HAS BEEN CLEANED / DISINFECTED] : This chamber has been cleaned / disinfected according to local and hospital policy and procedure prior to this treatment. [Patient educated on the risks of SMOKING prior to HBOT with understanding] : Patient educated on the risks of SMOKING prior to HBOT with understanding [Patient demonstrated and verbalized proper technique for using air break mask] : Patient demonstrated and verbalized proper technique for using air break mask [Patient educated on the risks of CONSUMING ALCOHOL prior to HBOT with understanding] : Patient educated on the risks of CONSUMING ALCOHOL prior to HBOT with understanding [100% Cotton] : 100% cotton [No hair oils, wigs, hairpieces, pins] : no hair oils, wigs, hairpieces, pins  [Empty all pockets] : empty all pockets [Pre tx medications] : pre tx medications  [No make-up, creams] : no make-up, creams  [No matches, cigarettes, lighters] : no matches, cigarettes, lighters  [No jewelry] : no jewelry  [Hearing aid removed] : hearing aid removed [Dentures removed] : dentures removed [Ground bracelet on pt's wrist] : ground bracelet on pt's wrist  [Contacts removed] : contacts removed  [No reading material] : no reading material  [Remove nail polish] : remove nail polish  [Bra, undergarments removed] : bra, undergarments removed  [No contraindicated dressings] : no contraindicated dressings [Ground Continuity - Verified < 1 ohm w/ Wrist Strap Barb] : Ground Continuity - Verified < 1 ohm w/ Wrist Strap Barb [Ground Wire - VISUAL Verification - Intact/Free of Obstruction] : Ground Wire - VISUAL Verification - Intact/Free of Obstruction  [Number: ___] : Number: [unfilled] [Diagnosis: ___] : Diagnosis: [unfilled] [____] : Post-Dive: Time - [unfilled] [___] : Post-Dive: Value - [unfilled] mg/dL [Clear all fields] : clear all fields [] : No [FreeTextEntry1] : 2.0 [FreeTextEntry3] : 90 mins [FreeTextEntry5] : 9918 [FreeTextEntry7] : 6926 [FreeTextEntry9] : 1000 [de-identified] : 1004 [de-identified] : 108 mins

## 2020-05-07 NOTE — ADDENDUM
[FreeTextEntry1] : Pt's BGL low. RN notified. Pt given 16g of glucose via 8oz juice Pt BGL retested. Pt BGL now within acceptable limits for Hbot tx. \par Pt descended to 2.0 RIVAS @ 1.75 PSI/min without incident in chamber # 2. \par Pt resting @ depth with chest rise and fall observed by chamber side. \par Pt ascended from 2.0 RIVAS @ 1.75 PSI/min without incident. \par Pt tolerated tx well.\par

## 2020-05-07 NOTE — ASSESSMENT
[No change from previous assessment] : No change from previous assessment [Time MD/Provider assessed Patient:_______] : Time MD/Provider assessed Patient: [unfilled] [Patient prepared for dive] : Patient prepared for dive [Patient undergoing HBO treatment for __________] : Patient undergoing HBO treatment for [unfilled] [Patient descended without problem for 9 minutes] : Patient descended without problem for 9 minutes [No dizziness or thirst] :  No dizziness or thirst [No ear problems] : No ear problems [Vital signs stable] : Vital signs stable [Tolerating dive well] : Tolerating dive well [No Chest Pain, shortness of breath] : No Chest Pain, shortness of breath [Respiratory Rate Stable] : Respiratory Rate Stable [No chest pain, shortness of breath, or ear pain] :  No chest pain, shortness of breath, or ear pain  [Tolerated Ascent well] : Tolerated Ascent well [Vital Signs stable] : Vital Signs stable [A physician was present throughout the entire HBOT] : A physician was present throughout the entire HBOT [Clinically Stable] : Clinically stable [No] : No [Continue Treatment Plan] : Continue treatment plan

## 2020-05-08 ENCOUNTER — APPOINTMENT (OUTPATIENT)
Dept: HYPERBARIC MEDICINE | Facility: HOSPITAL | Age: 45
End: 2020-05-08
Payer: COMMERCIAL

## 2020-05-08 ENCOUNTER — OUTPATIENT (OUTPATIENT)
Dept: OUTPATIENT SERVICES | Facility: HOSPITAL | Age: 45
LOS: 1 days | Discharge: HOME | End: 2020-05-08
Payer: COMMERCIAL

## 2020-05-08 VITALS
RESPIRATION RATE: 18 BRPM | OXYGEN SATURATION: 100 % | SYSTOLIC BLOOD PRESSURE: 202 MMHG | DIASTOLIC BLOOD PRESSURE: 89 MMHG | TEMPERATURE: 97.2 F | HEART RATE: 75 BPM

## 2020-05-08 VITALS
RESPIRATION RATE: 18 BRPM | DIASTOLIC BLOOD PRESSURE: 83 MMHG | SYSTOLIC BLOOD PRESSURE: 154 MMHG | HEART RATE: 76 BPM | OXYGEN SATURATION: 100 % | TEMPERATURE: 97 F

## 2020-05-08 DIAGNOSIS — L97.422 NON-PRESSURE CHRONIC ULCER OF LEFT HEEL AND MIDFOOT WITH FAT LAYER EXPOSED: ICD-10-CM

## 2020-05-08 DIAGNOSIS — E10.621 TYPE 1 DIABETES MELLITUS WITH FOOT ULCER: ICD-10-CM

## 2020-05-08 DIAGNOSIS — I77.0 ARTERIOVENOUS FISTULA, ACQUIRED: Chronic | ICD-10-CM

## 2020-05-08 DIAGNOSIS — Z79.4 LONG TERM (CURRENT) USE OF INSULIN: ICD-10-CM

## 2020-05-08 DIAGNOSIS — E11.621 TYPE 2 DIABETES MELLITUS WITH FOOT ULCER: ICD-10-CM

## 2020-05-08 PROCEDURE — 99183 HYPERBARIC OXYGEN THERAPY: CPT

## 2020-05-08 PROCEDURE — 82962 GLUCOSE BLOOD TEST: CPT

## 2020-05-08 PROCEDURE — G0277: CPT

## 2020-05-08 NOTE — ASSESSMENT
[Patient undergoing HBO treatment for __________] : Patient undergoing HBO treatment for [unfilled] [Patient descended without problem for 9 minutes] : Patient descended without problem for 9 minutes [No ear problems] : No ear problems [No dizziness or thirst] :  No dizziness or thirst [Vital signs stable] : Vital signs stable [No Chest Pain, shortness of breath] : No Chest Pain, shortness of breath [Tolerating dive well] : Tolerating dive well [Respiratory Rate Stable] : Respiratory Rate Stable [No chest pain, shortness of breath, or ear pain] :  No chest pain, shortness of breath, or ear pain  [Tolerated Ascent well] : Tolerated Ascent well [Vital Signs stable] : Vital Signs stable [A physician was present throughout the entire HBOT] : A physician was present throughout the entire HBOT [No] : No [Clinically Stable] : Clinically stable [Continue Treatment Plan] : Continue treatment plan [0] : 0 out of 10

## 2020-05-11 ENCOUNTER — APPOINTMENT (OUTPATIENT)
Dept: HYPERBARIC MEDICINE | Facility: HOSPITAL | Age: 45
End: 2020-05-11
Payer: COMMERCIAL

## 2020-05-11 ENCOUNTER — OUTPATIENT (OUTPATIENT)
Dept: OUTPATIENT SERVICES | Facility: HOSPITAL | Age: 45
LOS: 1 days | Discharge: HOME | End: 2020-05-11
Payer: COMMERCIAL

## 2020-05-11 VITALS
HEART RATE: 75 BPM | DIASTOLIC BLOOD PRESSURE: 85 MMHG | SYSTOLIC BLOOD PRESSURE: 189 MMHG | OXYGEN SATURATION: 99 % | RESPIRATION RATE: 18 BRPM | TEMPERATURE: 97.5 F

## 2020-05-11 VITALS
OXYGEN SATURATION: 100 % | SYSTOLIC BLOOD PRESSURE: 141 MMHG | DIASTOLIC BLOOD PRESSURE: 80 MMHG | TEMPERATURE: 98.5 F | RESPIRATION RATE: 16 BRPM | HEART RATE: 83 BPM

## 2020-05-11 DIAGNOSIS — Z79.4 LONG TERM (CURRENT) USE OF INSULIN: ICD-10-CM

## 2020-05-11 DIAGNOSIS — E11.621 TYPE 2 DIABETES MELLITUS WITH FOOT ULCER: ICD-10-CM

## 2020-05-11 DIAGNOSIS — L97.422 NON-PRESSURE CHRONIC ULCER OF LEFT HEEL AND MIDFOOT WITH FAT LAYER EXPOSED: ICD-10-CM

## 2020-05-11 DIAGNOSIS — I77.0 ARTERIOVENOUS FISTULA, ACQUIRED: Chronic | ICD-10-CM

## 2020-05-11 PROCEDURE — 82962 GLUCOSE BLOOD TEST: CPT

## 2020-05-11 PROCEDURE — 99183 HYPERBARIC OXYGEN THERAPY: CPT

## 2020-05-11 PROCEDURE — G0277: CPT

## 2020-05-11 NOTE — PROCEDURE
[Outpatient] : Outpatient [Wheelchair] : wheelchair [THIS CHAMBER HAS BEEN CLEANED / DISINFECTED] : This chamber has been cleaned / disinfected according to local and hospital policy and procedure prior to this treatment. [Patient demonstrated and verbalized proper technique for using air break mask] : Patient demonstrated and verbalized proper technique for using air break mask [Patient educated on the risks of SMOKING prior to HBOT with understanding] : Patient educated on the risks of SMOKING prior to HBOT with understanding [Patient educated on the risks of CONSUMING ALCOHOL prior to HBOT with understanding] : Patient educated on the risks of CONSUMING ALCOHOL prior to HBOT with understanding [100% Cotton] : 100% cotton [Empty all pockets] : empty all pockets [No hair oils, wigs, hairpieces, pins] : no hair oils, wigs, hairpieces, pins  [Pre tx medications] : pre tx medications  [No make-up, creams] : no make-up, creams  [No jewelry] : no jewelry  [No matches, cigarettes, lighters] : no matches, cigarettes, lighters  [Hearing aid removed] : hearing aid removed [Dentures removed] : dentures removed [Ground bracelet on pt's wrist] : ground bracelet on pt's wrist  [Contacts removed] : contacts removed  [Remove nail polish] : remove nail polish  [Bra, undergarments removed] : bra, undergarments removed  [No reading material] : no reading material  [No contraindicated dressings] : no contraindicated dressings [Ground Wire - VISUAL Verification - Intact/Free of Obstruction] : Ground Wire - VISUAL Verification - Intact/Free of Obstruction  [Ground Continuity - Verified < 1 ohm w/ Wrist Strap Barb] : Ground Continuity - Verified < 1 ohm w/ Wrist Strap Barb [Number: ___] : Number: [unfilled] [Diagnosis: ___] : Diagnosis: [unfilled] [____] : Post-Dive: Time - [unfilled] [___] : Post-Dive: Value - [unfilled] mg/dL [Clear all fields] : clear all fields [] : No [FreeTextEntry1] : tx depth  [FreeTextEntry3] : 90 [FreeTextEntry5] : 3209 [FreeTextEntry7] : 1390 [FreeTextEntry9] : 5114 [de-identified] : 0936 [de-identified] : 108 min

## 2020-05-11 NOTE — ADDENDUM
[FreeTextEntry1] : Drainage cleared to be changed post HBO tx. Pt's feet offloaded using wedge. \par Pt descended to 2.0 RIVAS @ 1.75 PSI/min without incident in chamber #1. \par Pt resting @ depth with chest rise and fall observed throughout tx. \par Pt ascended from depth without incident. \par Pt tolerated tx well\par Pt receive wound care post HBOT by RN.\par Pt blood pressure above parameters post HBOT. RN notified. Pt rested and re-tested. Pt blood pressure within parameters post HBOT.

## 2020-05-12 ENCOUNTER — APPOINTMENT (OUTPATIENT)
Dept: HYPERBARIC MEDICINE | Facility: HOSPITAL | Age: 45
End: 2020-05-12

## 2020-05-12 VITALS
HEART RATE: 91 BPM | DIASTOLIC BLOOD PRESSURE: 67 MMHG | OXYGEN SATURATION: 99 % | SYSTOLIC BLOOD PRESSURE: 148 MMHG | RESPIRATION RATE: 16 BRPM | TEMPERATURE: 97.1 F

## 2020-05-12 NOTE — PROCEDURE
[Outpatient] : Outpatient [Ambulatory] : Patient is ambulatory. [THIS CHAMBER HAS BEEN CLEANED / DISINFECTED] : This chamber has been cleaned / disinfected according to local and hospital policy and procedure prior to this treatment. [Patient demonstrated and verbalized proper technique for using air break mask] : Patient demonstrated and verbalized proper technique for using air break mask [Patient educated on the risks of SMOKING prior to HBOT with understanding] : Patient educated on the risks of SMOKING prior to HBOT with understanding [Patient educated on the risks of CONSUMING ALCOHOL prior to HBOT with understanding] : Patient educated on the risks of CONSUMING ALCOHOL prior to HBOT with understanding [100% Cotton] : 100% cotton [Empty all pockets] : empty all pockets [No hair oils, wigs, hairpieces, pins] : no hair oils, wigs, hairpieces, pins  [Pre tx medications] : pre tx medications  [No make-up, creams] : no make-up, creams  [No jewelry] : no jewelry  [No matches, cigarettes, lighters] : no matches, cigarettes, lighters  [Hearing aid removed] : hearing aid removed [Dentures removed] : dentures removed [Ground bracelet on pt's wrist] : ground bracelet on pt's wrist  [Contacts removed] : contacts removed  [Remove nail polish] : remove nail polish  [No reading material] : no reading material  [Bra, undergarments removed] : bra, undergarments removed  [No contraindicated dressings] : no contraindicated dressings [Ground Wire - VISUAL Verification - Intact/Free of Obstruction] : Ground Wire - VISUAL Verification - Intact/Free of Obstruction  [Ground Continuity - Verified < 1 ohm w/ Wrist Strap Barb] : Ground Continuity - Verified < 1 ohm w/ Wrist Strap Barb [Number: ___] : Number: [unfilled] [Diagnosis: ___] : Diagnosis: [unfilled] [____] : Post-Dive: Time - [unfilled] [Clear all fields] : clear all fields [___] : Post-Dive: Value - [unfilled] mg/dL [] : No [FreeTextEntry1] : 2.0 [FreeTextEntry3] : 90 mins [FreeTextEntry5] : 3726 [FreeTextEntry7] : 3754 [FreeTextEntry9] : 6508 [de-identified] : 0949 [de-identified] : 108 mins

## 2020-05-12 NOTE — ADDENDUM
[FreeTextEntry1] : Pt descended  to 2.0 RIVAS @ 1.75 PSI/min without incident in chamber # 1. \par Pt resting @ depth with chest rise and fall observed by chamber side. \par Pt ascended from 2.0 RIVAS @ 1.75 PSI/min without incident. \par Pt tolerated tx well. Pt Recieved WC by RN post tx. \par

## 2020-05-12 NOTE — ASSESSMENT
[No change from previous assessment] : No change from previous assessment [Time MD/Provider assessed Patient:_______] : Time MD/Provider assessed Patient: [unfilled] [Patient undergoing HBO treatment for __________] : Patient undergoing HBO treatment for [unfilled] [Patient prepared for dive] : Patient prepared for dive [Patient descended without problem for 9 minutes] : Patient descended without problem for 9 minutes [No dizziness or thirst] :  No dizziness or thirst [Vital signs stable] : Vital signs stable [No ear problems] : No ear problems [Tolerating dive well] : Tolerating dive well [Respiratory Rate Stable] : Respiratory Rate Stable [No Chest Pain, shortness of breath] : No Chest Pain, shortness of breath [Tolerated Ascent well] : Tolerated Ascent well [No chest pain, shortness of breath, or ear pain] :  No chest pain, shortness of breath, or ear pain  [No] : No [A physician was present throughout the entire HBOT] : A physician was present throughout the entire HBOT [Vital Signs stable] : Vital Signs stable [Continue Treatment Plan] : Continue treatment plan [Clinically Stable] : Clinically stable

## 2020-05-12 NOTE — ADDENDUM
[FreeTextEntry1] : Pt received [place] HERNANDEZ  prior tx. \par Pt descended  to 2.0 RIVAS @ 1.75 PSI/min without incident in chamber # 1. \par Pt resting @ depth with chest rise and fall observed by chamber side. . \par Pt ascended from 2.0 RIVAS @ 1.75 PSI/min without incident. \par Pt tolerated tx well.\par

## 2020-05-12 NOTE — PROCEDURE
[Outpatient] : Outpatient [Ambulatory] : Patient is ambulatory. [THIS CHAMBER HAS BEEN CLEANED / DISINFECTED] : This chamber has been cleaned / disinfected according to local and hospital policy and procedure prior to this treatment. [Patient demonstrated and verbalized proper technique for using air break mask] : Patient demonstrated and verbalized proper technique for using air break mask [Patient educated on the risks of SMOKING prior to HBOT with understanding] : Patient educated on the risks of SMOKING prior to HBOT with understanding [Patient educated on the risks of CONSUMING ALCOHOL prior to HBOT with understanding] : Patient educated on the risks of CONSUMING ALCOHOL prior to HBOT with understanding [100% Cotton] : 100% cotton [Empty all pockets] : empty all pockets [No hair oils, wigs, hairpieces, pins] : no hair oils, wigs, hairpieces, pins  [Pre tx medications] : pre tx medications  [No make-up, creams] : no make-up, creams  [No jewelry] : no jewelry  [Hearing aid removed] : hearing aid removed [No matches, cigarettes, lighters] : no matches, cigarettes, lighters  [Dentures removed] : dentures removed [Ground bracelet on pt's wrist] : ground bracelet on pt's wrist  [Contacts removed] : contacts removed  [Remove nail polish] : remove nail polish  [Bra, undergarments removed] : bra, undergarments removed  [No contraindicated dressings] : no contraindicated dressings [Ground Wire - VISUAL Verification - Intact/Free of Obstruction] : Ground Wire - VISUAL Verification - Intact/Free of Obstruction  [Ground Continuity - Verified < 1 ohm w/ Wrist Strap Barb] : Ground Continuity - Verified < 1 ohm w/ Wrist Strap Barb [No reading material] : no reading material  [Number: ___] : Number: [unfilled] [Diagnosis: ___] : Diagnosis: [unfilled] [____] : Post-Dive: Time - [unfilled] [___] : Post-Dive: Value - [unfilled] mg/dL [Clear all fields] : clear all fields [] : No [FreeTextEntry1] : 2.0 [FreeTextEntry3] : 90 mins [FreeTextEntry7] : 5826 [FreeTextEntry5] : 0506 [de-identified] : 108 mins [FreeTextEntry9] : 0950 [de-identified] : 4886

## 2020-05-12 NOTE — PROCEDURE
[THIS CHAMBER HAS BEEN CLEANED / DISINFECTED] : This chamber has been cleaned / disinfected according to local and hospital policy and procedure prior to this treatment. [____] : Pre-Dive: Time - [unfilled] [___] : Pre-Dive: Value - [unfilled] mg/dL [Number: ___] : Number: [unfilled] [Diagnosis: ___] : Diagnosis: [unfilled] [] : No

## 2020-05-13 ENCOUNTER — APPOINTMENT (OUTPATIENT)
Dept: HYPERBARIC MEDICINE | Facility: HOSPITAL | Age: 45
End: 2020-05-13
Payer: COMMERCIAL

## 2020-05-13 ENCOUNTER — OUTPATIENT (OUTPATIENT)
Dept: OUTPATIENT SERVICES | Facility: HOSPITAL | Age: 45
LOS: 1 days | Discharge: HOME | End: 2020-05-13
Payer: COMMERCIAL

## 2020-05-13 VITALS
WEIGHT: 176 LBS | TEMPERATURE: 98.3 F | SYSTOLIC BLOOD PRESSURE: 176 MMHG | OXYGEN SATURATION: 97 % | HEART RATE: 78 BPM | BODY MASS INDEX: 27.62 KG/M2 | RESPIRATION RATE: 18 BRPM | DIASTOLIC BLOOD PRESSURE: 76 MMHG | HEIGHT: 67 IN

## 2020-05-13 DIAGNOSIS — I77.0 ARTERIOVENOUS FISTULA, ACQUIRED: Chronic | ICD-10-CM

## 2020-05-13 DIAGNOSIS — E11.621 TYPE 2 DIABETES MELLITUS WITH FOOT ULCER: ICD-10-CM

## 2020-05-13 PROCEDURE — 99213 OFFICE O/P EST LOW 20 MIN: CPT

## 2020-05-13 PROCEDURE — G0463: CPT

## 2020-05-13 NOTE — PLAN
[FreeTextEntry1] : continue HBOT . However patient understands that his heel ulcers have destroyed both bone and soft tissue that he is always subject to future infections and high risk for BKAs .  Continue off loading , HBOT and wound care

## 2020-05-13 NOTE — REVIEW OF SYSTEMS
[Arthralgias] : arthralgias [Skin Wound] : skin wound [Joint Stiffness] : joint stiffness [Chills] : no chills [Fever] : no fever [Loss Of Hearing] : no hearing loss [Abdominal Pain] : no abdominal pain [Shortness Of Breath] : no shortness of breath [Vomiting] : no vomiting [Anxiety] : no anxiety [Easy Bleeding] : no tendency for easy bleeding [FreeTextEntry5] : diabetic small vessel diseas and cardio vascular disease  [de-identified] : DFU 3 plantar posterior left and right heel down to skin, subcutaneous tissue, and fat , necrotic  [de-identified] : IDDM with neuropathy  [de-identified] : IDDM , patient on Dialysis

## 2020-05-13 NOTE — PHYSICAL EXAM
[4 x 4] : 4 x 4  [0] : right 0 [1+] : left 1+ [Ankle Swelling On The Right] : mild [Varicose Veins Of Lower Extremities] : present [No Rash or Lesion] : No rash or lesion [Skin Ulcer] : ulcer [Calm] : calm [Purpura] : no purpura  [Petechiae] : no petechiae [de-identified] : Diabetic small vessel and cardio vascular disease  [de-identified] : comfortable  [de-identified] : previous calcanectomy of the right heel , possible OM of the left heel , OM of the right pinkie finger  [de-identified] : Diabetic neuropathy , Dialysis  3x per week  [de-identified] : DFU 3 plantar posterior left and right heel down to skin, subcutaneous tissue, and fat [FreeTextEntry1] : Left Posterior Heel [FreeTextEntry2] : 0.7 [FreeTextEntry3] : 0.8 [FreeTextEntry4] : 0.5 [de-identified] : Serosanguineous [de-identified] : Callus [de-identified] : Silver Alginate [de-identified] : Cleansed with Normal Saline\par  [FreeTextEntry7] : Right Posterior Heel [FreeTextEntry9] : 0.9 [FreeTextEntry8] : 1.0 [de-identified] : 0.2 [de-identified] : Serosanguineous [de-identified] : Silver Alginate [de-identified] : Callus [de-identified] : Cleansed with Normal Saline\par  [TWNoteComboBox4] : Small [TWNoteComboBox5] : No [de-identified] : None [de-identified] : No [de-identified] : None [de-identified] : 100% [de-identified] : No [de-identified] : 3x Weekly [de-identified] : No [de-identified] : Small [de-identified] : None [de-identified] : None [de-identified] : No [de-identified] : No [de-identified] : 100% [de-identified] : 3x Weekly

## 2020-05-13 NOTE — ASSESSMENT
[Patient] : Patient [Verbal] : Verbal [Good - alert, interested, motivated] : Good - alert, interested, motivated [Verbalizes knowledge/Understanding] : Verbalizes knowledge/understanding [Dressing changes] : dressing changes [Skin Care] : skin care [Foot Care] : foot care [Signs and symptoms of infection] : sign and symptoms of infection [Pressure relief] : pressure relief [Hyperbaric Therapy] : hyperbaric therapy [How and When to Call] : how and when to call [Off-loading] : off-loading [Patient responsibility to plan of care] : patient responsibility to plan of care [] : Yes [Stable] : stable [Home] : Home [Wheelchair] : Wheelchair [Not Applicable - Long Term Care/Home Health Agency] : Long Term Care/Home Health Agency: Not Applicable [FreeTextEntry2] : Restore Skin Integrity\par Infection Control\par Localized wound care\par Hyperbaric Therapy\par Develop Realistic expectations and measurable treatments nursing POC to reduce, eliminate or develop acceptable pain limit tolerance [FreeTextEntry4] : HBOT 30/40\par Photos Taken\par F/U to [facility] Daily HBOT, 1 week assessment

## 2020-05-14 ENCOUNTER — OUTPATIENT (OUTPATIENT)
Dept: OUTPATIENT SERVICES | Facility: HOSPITAL | Age: 45
LOS: 1 days | Discharge: HOME | End: 2020-05-14
Payer: COMMERCIAL

## 2020-05-14 ENCOUNTER — APPOINTMENT (OUTPATIENT)
Dept: HYPERBARIC MEDICINE | Facility: HOSPITAL | Age: 45
End: 2020-05-14
Payer: COMMERCIAL

## 2020-05-14 VITALS
DIASTOLIC BLOOD PRESSURE: 84 MMHG | OXYGEN SATURATION: 99 % | RESPIRATION RATE: 16 BRPM | TEMPERATURE: 97.5 F | HEART RATE: 75 BPM | SYSTOLIC BLOOD PRESSURE: 190 MMHG

## 2020-05-14 VITALS
DIASTOLIC BLOOD PRESSURE: 73 MMHG | OXYGEN SATURATION: 100 % | TEMPERATURE: 97.2 F | HEART RATE: 81 BPM | SYSTOLIC BLOOD PRESSURE: 156 MMHG | RESPIRATION RATE: 18 BRPM

## 2020-05-14 DIAGNOSIS — I77.0 ARTERIOVENOUS FISTULA, ACQUIRED: Chronic | ICD-10-CM

## 2020-05-14 DIAGNOSIS — L97.412 NON-PRESSURE CHRONIC ULCER OF RIGHT HEEL AND MIDFOOT WITH FAT LAYER EXPOSED: ICD-10-CM

## 2020-05-14 DIAGNOSIS — L97.422 NON-PRESSURE CHRONIC ULCER OF LEFT HEEL AND MIDFOOT WITH FAT LAYER EXPOSED: ICD-10-CM

## 2020-05-14 DIAGNOSIS — E10.621 TYPE 1 DIABETES MELLITUS WITH FOOT ULCER: ICD-10-CM

## 2020-05-14 DIAGNOSIS — Z79.4 LONG TERM (CURRENT) USE OF INSULIN: ICD-10-CM

## 2020-05-14 DIAGNOSIS — E11.621 TYPE 2 DIABETES MELLITUS WITH FOOT ULCER: ICD-10-CM

## 2020-05-14 PROCEDURE — 99183 HYPERBARIC OXYGEN THERAPY: CPT

## 2020-05-14 PROCEDURE — G0277: CPT

## 2020-05-14 PROCEDURE — 82962 GLUCOSE BLOOD TEST: CPT

## 2020-05-14 NOTE — ASSESSMENT
[No change from previous assessment] : No change from previous assessment [Time MD/Provider assessed Patient:_______] : Time MD/Provider assessed Patient: [unfilled] [Patient prepared for dive] : Patient prepared for dive [Patient undergoing HBO treatment for __________] : Patient undergoing HBO treatment for [unfilled] [Patient descended without problem for 9 minutes] : Patient descended without problem for 9 minutes [No dizziness or thirst] :  No dizziness or thirst [Vital signs stable] : Vital signs stable [No ear problems] : No ear problems [No Chest Pain, shortness of breath] : No Chest Pain, shortness of breath [Tolerating dive well] : Tolerating dive well [Respiratory Rate Stable] : Respiratory Rate Stable [No chest pain, shortness of breath, or ear pain] :  No chest pain, shortness of breath, or ear pain  [Tolerated Ascent well] : Tolerated Ascent well [Vital Signs stable] : Vital Signs stable [A physician was present throughout the entire HBOT] : A physician was present throughout the entire HBOT [No] : No [Clinically Stable] : Clinically stable [Continue Treatment Plan] : Continue treatment plan

## 2020-05-15 ENCOUNTER — APPOINTMENT (OUTPATIENT)
Dept: HYPERBARIC MEDICINE | Facility: HOSPITAL | Age: 45
End: 2020-05-15
Payer: COMMERCIAL

## 2020-05-15 ENCOUNTER — OUTPATIENT (OUTPATIENT)
Dept: OUTPATIENT SERVICES | Facility: HOSPITAL | Age: 45
LOS: 1 days | Discharge: HOME | End: 2020-05-15
Payer: COMMERCIAL

## 2020-05-15 VITALS
SYSTOLIC BLOOD PRESSURE: 169 MMHG | HEART RATE: 77 BPM | DIASTOLIC BLOOD PRESSURE: 85 MMHG | RESPIRATION RATE: 16 BRPM | OXYGEN SATURATION: 100 % | TEMPERATURE: 98 F

## 2020-05-15 VITALS — SYSTOLIC BLOOD PRESSURE: 178 MMHG | DIASTOLIC BLOOD PRESSURE: 88 MMHG

## 2020-05-15 VITALS
TEMPERATURE: 97.2 F | OXYGEN SATURATION: 100 % | HEART RATE: 71 BPM | RESPIRATION RATE: 16 BRPM | SYSTOLIC BLOOD PRESSURE: 208 MMHG | DIASTOLIC BLOOD PRESSURE: 92 MMHG

## 2020-05-15 DIAGNOSIS — E11.621 TYPE 2 DIABETES MELLITUS WITH FOOT ULCER: ICD-10-CM

## 2020-05-15 DIAGNOSIS — E10.621 TYPE 1 DIABETES MELLITUS WITH FOOT ULCER: ICD-10-CM

## 2020-05-15 DIAGNOSIS — L97.422 NON-PRESSURE CHRONIC ULCER OF LEFT HEEL AND MIDFOOT WITH FAT LAYER EXPOSED: ICD-10-CM

## 2020-05-15 DIAGNOSIS — Z79.4 LONG TERM (CURRENT) USE OF INSULIN: ICD-10-CM

## 2020-05-15 DIAGNOSIS — L97.412 NON-PRESSURE CHRONIC ULCER OF RIGHT HEEL AND MIDFOOT WITH FAT LAYER EXPOSED: ICD-10-CM

## 2020-05-15 DIAGNOSIS — I77.0 ARTERIOVENOUS FISTULA, ACQUIRED: Chronic | ICD-10-CM

## 2020-05-15 PROCEDURE — G0277: CPT

## 2020-05-15 PROCEDURE — 82962 GLUCOSE BLOOD TEST: CPT

## 2020-05-15 PROCEDURE — 99183 HYPERBARIC OXYGEN THERAPY: CPT

## 2020-05-15 NOTE — ADDENDUM
[FreeTextEntry1] : Pt descended to 2.0 RIVAS @ 1.75 PSI/min without incident in chamber #1\par Pt resting @ depth with chest rise and fall observed by chamber side. \par Pt ascended from 2.0 RIVAS @ 1.75 PSI/min without incident. \par Pt tolerated tx well.\par \par

## 2020-05-15 NOTE — PROCEDURE
[Outpatient] : Outpatient [Ambulatory] : Patient is ambulatory. [THIS CHAMBER HAS BEEN CLEANED / DISINFECTED] : This chamber has been cleaned / disinfected according to local and hospital policy and procedure prior to this treatment. [Patient educated on the risks of SMOKING prior to HBOT with understanding] : Patient educated on the risks of SMOKING prior to HBOT with understanding [Patient demonstrated and verbalized proper technique for using air break mask] : Patient demonstrated and verbalized proper technique for using air break mask [Patient educated on the risks of CONSUMING ALCOHOL prior to HBOT with understanding] : Patient educated on the risks of CONSUMING ALCOHOL prior to HBOT with understanding [100% Cotton] : 100% cotton [Empty all pockets] : empty all pockets [No hair oils, wigs, hairpieces, pins] : no hair oils, wigs, hairpieces, pins  [Pre tx medications] : pre tx medications  [No make-up, creams] : no make-up, creams  [No jewelry] : no jewelry  [No matches, cigarettes, lighters] : no matches, cigarettes, lighters  [Hearing aid removed] : hearing aid removed [Dentures removed] : dentures removed [Ground bracelet on pt's wrist] : ground bracelet on pt's wrist  [Remove nail polish] : remove nail polish  [Contacts removed] : contacts removed  [No reading material] : no reading material  [Bra, undergarments removed] : bra, undergarments removed  [No contraindicated dressings] : no contraindicated dressings [Ground Wire - VISUAL Verification - Intact/Free of Obstruction] : Ground Wire - VISUAL Verification - Intact/Free of Obstruction  [Ground Continuity - Verified < 1 ohm w/ Wrist Strap Barb] : Ground Continuity - Verified < 1 ohm w/ Wrist Strap Barb [Number: ___] : Number: [unfilled] [Diagnosis: ___] : Diagnosis: [unfilled] [____] : Post-Dive: Time - [unfilled] [___] : Post-Dive: Value - [unfilled] mg/dL [Clear all fields] : clear all fields [] : No [FreeTextEntry1] : 2.0 [FreeTextEntry5] : 7471 [FreeTextEntry3] : 90 mins [FreeTextEntry9] : 9941 [FreeTextEntry7] : 2927 [de-identified] : 1002 [de-identified] : 108 mins

## 2020-05-15 NOTE — ASSESSMENT
[Patient undergoing HBO treatment for __________] : Patient undergoing HBO treatment for [unfilled] [Patient descended without problem for 9 minutes] : Patient descended without problem for 9 minutes [No dizziness or thirst] :  No dizziness or thirst [Tolerating dive well] : Tolerating dive well [Vital signs stable] : Vital signs stable [No ear problems] : No ear problems [Respiratory Rate Stable] : Respiratory Rate Stable [No Chest Pain, shortness of breath] : No Chest Pain, shortness of breath [Tolerated Ascent well] : Tolerated Ascent well [Vital Signs stable] : Vital Signs stable [No chest pain, shortness of breath, or ear pain] :  No chest pain, shortness of breath, or ear pain  [A physician was present throughout the entire HBOT] : A physician was present throughout the entire HBOT [No] : No [Clinically Stable] : Clinically stable [Continue Treatment Plan] : Continue treatment plan [0] : 0 out of 10

## 2020-05-15 NOTE — ADDENDUM
[FreeTextEntry1] : Pt descended to depth without incident in chamber # 2.\par Pt is resting @ depth with chest rise and fall observed @ chamber side.\par PT ASCENDED FROM 2.0 RIVAS @ 1.75 PSI/MIN WITHOUT INCIDENT. PT TOLERATED TX WELL.\par \par PT'S BLOOD PRESSURE HIGH POST HBOT. IMMEDIATE RE-TEST PERFORMED, PT'S BLOOD PRESSURE STILL HIGH. PT RESTED AND RE-TESTED AFTER 5 MINUTES, PT'S BLOOD PRESSURE SITLL HIGH. RN NOTIFIED. MD NOTIFIED. PT SENT HOME.

## 2020-05-15 NOTE — PROCEDURE
[Outpatient] : Outpatient [Wheelchair] : wheelchair [THIS CHAMBER HAS BEEN CLEANED / DISINFECTED] : This chamber has been cleaned / disinfected according to local and hospital policy and procedure prior to this treatment. [Patient demonstrated and verbalized proper technique for using air break mask] : Patient demonstrated and verbalized proper technique for using air break mask [Patient educated on the risks of SMOKING prior to HBOT with understanding] : Patient educated on the risks of SMOKING prior to HBOT with understanding [Patient educated on the risks of CONSUMING ALCOHOL prior to HBOT with understanding] : Patient educated on the risks of CONSUMING ALCOHOL prior to HBOT with understanding [100% Cotton] : 100% cotton [No hair oils, wigs, hairpieces, pins] : no hair oils, wigs, hairpieces, pins  [Empty all pockets] : empty all pockets [Pre tx medications] : pre tx medications  [No make-up, creams] : no make-up, creams  [No matches, cigarettes, lighters] : no matches, cigarettes, lighters  [No jewelry] : no jewelry  [Hearing aid removed] : hearing aid removed [Dentures removed] : dentures removed [Ground bracelet on pt's wrist] : ground bracelet on pt's wrist  [Contacts removed] : contacts removed  [Remove nail polish] : remove nail polish  [Bra, undergarments removed] : bra, undergarments removed  [No reading material] : no reading material  [No contraindicated dressings] : no contraindicated dressings [Ground Wire - VISUAL Verification - Intact/Free of Obstruction] : Ground Wire - VISUAL Verification - Intact/Free of Obstruction  [Ground Continuity - Verified < 1 ohm w/ Wrist Strap Barb] : Ground Continuity - Verified < 1 ohm w/ Wrist Strap Barb [Number: ___] : Number: [unfilled] [Diagnosis: ___] : Diagnosis: [unfilled] [____] : Post-Dive: Time - [unfilled] [___] : Post-Dive: Value - [unfilled] mg/dL [Clear all fields] : clear all fields [] : No [FreeTextEntry1] : tx depth  [FreeTextEntry3] : 90 [FreeTextEntry5] : 1306 [FreeTextEntry7] : 3085 [FreeTextEntry9] : 5476 [de-identified] : 6240 [de-identified] : 108 MIN

## 2020-05-18 ENCOUNTER — APPOINTMENT (OUTPATIENT)
Dept: HYPERBARIC MEDICINE | Facility: HOSPITAL | Age: 45
End: 2020-05-18

## 2020-05-19 ENCOUNTER — APPOINTMENT (OUTPATIENT)
Dept: HYPERBARIC MEDICINE | Facility: HOSPITAL | Age: 45
End: 2020-05-19
Payer: COMMERCIAL

## 2020-05-19 ENCOUNTER — OUTPATIENT (OUTPATIENT)
Dept: OUTPATIENT SERVICES | Facility: HOSPITAL | Age: 45
LOS: 1 days | Discharge: HOME | End: 2020-05-19
Payer: COMMERCIAL

## 2020-05-19 VITALS
RESPIRATION RATE: 16 BRPM | DIASTOLIC BLOOD PRESSURE: 70 MMHG | SYSTOLIC BLOOD PRESSURE: 175 MMHG | OXYGEN SATURATION: 99 % | HEART RATE: 70 BPM | TEMPERATURE: 97.9 F

## 2020-05-19 VITALS
TEMPERATURE: 98.4 F | RESPIRATION RATE: 18 BRPM | HEART RATE: 72 BPM | OXYGEN SATURATION: 100 % | SYSTOLIC BLOOD PRESSURE: 125 MMHG | DIASTOLIC BLOOD PRESSURE: 72 MMHG

## 2020-05-19 DIAGNOSIS — E10.621 TYPE 1 DIABETES MELLITUS WITH FOOT ULCER: ICD-10-CM

## 2020-05-19 DIAGNOSIS — L97.422 NON-PRESSURE CHRONIC ULCER OF LEFT HEEL AND MIDFOOT WITH FAT LAYER EXPOSED: ICD-10-CM

## 2020-05-19 DIAGNOSIS — Z79.4 LONG TERM (CURRENT) USE OF INSULIN: ICD-10-CM

## 2020-05-19 DIAGNOSIS — E11.621 TYPE 2 DIABETES MELLITUS WITH FOOT ULCER: ICD-10-CM

## 2020-05-19 DIAGNOSIS — L97.412 NON-PRESSURE CHRONIC ULCER OF RIGHT HEEL AND MIDFOOT WITH FAT LAYER EXPOSED: ICD-10-CM

## 2020-05-19 DIAGNOSIS — I77.0 ARTERIOVENOUS FISTULA, ACQUIRED: Chronic | ICD-10-CM

## 2020-05-19 PROCEDURE — 82962 GLUCOSE BLOOD TEST: CPT

## 2020-05-19 PROCEDURE — 99183 HYPERBARIC OXYGEN THERAPY: CPT

## 2020-05-19 PROCEDURE — G0277: CPT

## 2020-05-19 NOTE — PROCEDURE
[Wheelchair] : wheelchair [Outpatient] : Outpatient [THIS CHAMBER HAS BEEN CLEANED / DISINFECTED] : This chamber has been cleaned / disinfected according to local and hospital policy and procedure prior to this treatment. [Patient demonstrated and verbalized proper technique for using air break mask] : Patient demonstrated and verbalized proper technique for using air break mask [Patient educated on the risks of SMOKING prior to HBOT with understanding] : Patient educated on the risks of SMOKING prior to HBOT with understanding [Patient educated on the risks of CONSUMING ALCOHOL prior to HBOT with understanding] : Patient educated on the risks of CONSUMING ALCOHOL prior to HBOT with understanding [100% Cotton] : 100% cotton [Empty all pockets] : empty all pockets [No hair oils, wigs, hairpieces, pins] : no hair oils, wigs, hairpieces, pins  [Pre tx medications] : pre tx medications  [No make-up, creams] : no make-up, creams  [No jewelry] : no jewelry  [Dentures removed] : dentures removed [Hearing aid removed] : hearing aid removed [No matches, cigarettes, lighters] : no matches, cigarettes, lighters  [Ground bracelet on pt's wrist] : ground bracelet on pt's wrist  [Remove nail polish] : remove nail polish  [No reading material] : no reading material  [Contacts removed] : contacts removed  [Bra, undergarments removed] : bra, undergarments removed  [Ground Wire - VISUAL Verification - Intact/Free of Obstruction] : Ground Wire - VISUAL Verification - Intact/Free of Obstruction  [No contraindicated dressings] : no contraindicated dressings [Ground Continuity - Verified < 1 ohm w/ Wrist Strap Barb] : Ground Continuity - Verified < 1 ohm w/ Wrist Strap Barb [Number: ___] : Number: [unfilled] [Diagnosis: ___] : Diagnosis: [unfilled] [___] : Post-Dive: Value - [unfilled] mg/dL [____] : Post-Dive: Time - [unfilled] [Clear all fields] : clear all fields [] : No [FreeTextEntry1] : 2.0 [FreeTextEntry3] : 90 mins [FreeTextEntry5] : 9304 [FreeTextEntry7] : 9918 [FreeTextEntry9] : 8802 [de-identified] : 108 mins [de-identified] : 0996

## 2020-05-19 NOTE — ADDENDUM
[FreeTextEntry1] : DRAINAGE NOTED ON PT'S DRESSING PRIOR TO DESCENT. RN NOTIFIED. PT TO RECEIVE WOUND CARE POST HBOT.\par \par PT DESCENDED TO 2.0 RIVAS @ 1.75PSI/MIN WITHOUT INCIDENT IN CHAMBER # 2 .PT'S RESTING AT TX DEPTH WITH WOUND OFFLOADED. CHEST RISE AND FALL OBSERVED CHAMBERSIDE.\par pt ascended from 2.0 RIVAS@ 1.75psi/min without incident. Pt received wc by LPN post tx.

## 2020-05-19 NOTE — ASSESSMENT
[No dizziness or thirst] :  No dizziness or thirst [No ear problems] : No ear problems [Tolerating dive well] : Tolerating dive well [Vital signs stable] : Vital signs stable [Respiratory Rate Stable] : Respiratory Rate Stable [No Chest Pain, shortness of breath] : No Chest Pain, shortness of breath [Tolerated Ascent well] : Tolerated Ascent well [Vital Signs stable] : Vital Signs stable [A physician was present throughout the entire HBOT] : A physician was present throughout the entire HBOT [Clinically Stable] : Clinically stable [No] : No [Continue Treatment Plan] : Continue treatment plan [0] : 0 out of 10

## 2020-05-20 ENCOUNTER — OUTPATIENT (OUTPATIENT)
Dept: OUTPATIENT SERVICES | Facility: HOSPITAL | Age: 45
LOS: 1 days | Discharge: HOME | End: 2020-05-20
Payer: COMMERCIAL

## 2020-05-20 ENCOUNTER — APPOINTMENT (OUTPATIENT)
Dept: HYPERBARIC MEDICINE | Facility: HOSPITAL | Age: 45
End: 2020-05-20
Payer: COMMERCIAL

## 2020-05-20 VITALS
OXYGEN SATURATION: 100 % | DIASTOLIC BLOOD PRESSURE: 83 MMHG | RESPIRATION RATE: 18 BRPM | HEART RATE: 71 BPM | SYSTOLIC BLOOD PRESSURE: 159 MMHG | TEMPERATURE: 97.2 F

## 2020-05-20 VITALS
RESPIRATION RATE: 18 BRPM | TEMPERATURE: 97 F | OXYGEN SATURATION: 100 % | SYSTOLIC BLOOD PRESSURE: 181 MMHG | DIASTOLIC BLOOD PRESSURE: 85 MMHG | HEART RATE: 69 BPM

## 2020-05-20 DIAGNOSIS — L97.422 NON-PRESSURE CHRONIC ULCER OF LEFT HEEL AND MIDFOOT WITH FAT LAYER EXPOSED: ICD-10-CM

## 2020-05-20 DIAGNOSIS — E10.621 TYPE 1 DIABETES MELLITUS WITH FOOT ULCER: ICD-10-CM

## 2020-05-20 DIAGNOSIS — I77.0 ARTERIOVENOUS FISTULA, ACQUIRED: Chronic | ICD-10-CM

## 2020-05-20 DIAGNOSIS — E11.621 TYPE 2 DIABETES MELLITUS WITH FOOT ULCER: ICD-10-CM

## 2020-05-20 DIAGNOSIS — L97.412 NON-PRESSURE CHRONIC ULCER OF RIGHT HEEL AND MIDFOOT WITH FAT LAYER EXPOSED: ICD-10-CM

## 2020-05-20 DIAGNOSIS — Z79.4 LONG TERM (CURRENT) USE OF INSULIN: ICD-10-CM

## 2020-05-20 PROCEDURE — G0277: CPT

## 2020-05-20 PROCEDURE — 82962 GLUCOSE BLOOD TEST: CPT

## 2020-05-20 PROCEDURE — 99183 HYPERBARIC OXYGEN THERAPY: CPT

## 2020-05-20 NOTE — ADDENDUM
[FreeTextEntry1] : No drainage noted on Pt's bandage. Pt's heels offloaded using blankets.\par Pt descended to 2.0 RIVAS @ 1.75 PSI/min without incident in chamber #1.\par Pt resting @ depth with chest rise and fall observed throughout tx.\par Pt ascended from 2.0 RIVAS @ 1.75 PSI/min without incident. \par Pt tolerated tx well.\par

## 2020-05-20 NOTE — ASSESSMENT
[No change from previous assessment] : No change from previous assessment [No dizziness or thirst] :  No dizziness or thirst [No ear problems] : No ear problems [Vital signs stable] : Vital signs stable [No Chest Pain, shortness of breath] : No Chest Pain, shortness of breath [Tolerating dive well] : Tolerating dive well [Respiratory Rate Stable] : Respiratory Rate Stable [No chest pain, shortness of breath, or ear pain] :  No chest pain, shortness of breath, or ear pain  [Tolerated Ascent well] : Tolerated Ascent well [Vital Signs stable] : Vital Signs stable [A physician was present throughout the entire HBOT] : A physician was present throughout the entire HBOT [No] : No [Clinically Stable] : Clinically stable [Continue Treatment Plan] : Continue treatment plan [0] : 0 out of 10

## 2020-05-20 NOTE — PROCEDURE
[Outpatient] : Outpatient [Wheelchair] : wheelchair [THIS CHAMBER HAS BEEN CLEANED / DISINFECTED] : This chamber has been cleaned / disinfected according to local and hospital policy and procedure prior to this treatment. [Patient demonstrated and verbalized proper technique for using air break mask] : Patient demonstrated and verbalized proper technique for using air break mask [Patient educated on the risks of CONSUMING ALCOHOL prior to HBOT with understanding] : Patient educated on the risks of CONSUMING ALCOHOL prior to HBOT with understanding [Patient educated on the risks of SMOKING prior to HBOT with understanding] : Patient educated on the risks of SMOKING prior to HBOT with understanding [100% Cotton] : 100% cotton [Empty all pockets] : empty all pockets [No hair oils, wigs, hairpieces, pins] : no hair oils, wigs, hairpieces, pins  [Pre tx medications] : pre tx medications  [No jewelry] : no jewelry  [No make-up, creams] : no make-up, creams  [No matches, cigarettes, lighters] : no matches, cigarettes, lighters  [Hearing aid removed] : hearing aid removed [Dentures removed] : dentures removed [Ground bracelet on pt's wrist] : ground bracelet on pt's wrist  [Contacts removed] : contacts removed  [Remove nail polish] : remove nail polish  [No reading material] : no reading material  [Bra, undergarments removed] : bra, undergarments removed  [No contraindicated dressings] : no contraindicated dressings [Ground Continuity - Verified < 1 ohm w/ Wrist Strap Barb] : Ground Continuity - Verified < 1 ohm w/ Wrist Strap Barb [Ground Wire - VISUAL Verification - Intact/Free of Obstruction] : Ground Wire - VISUAL Verification - Intact/Free of Obstruction  [Number: ___] : Number: [unfilled] [Diagnosis: ___] : Diagnosis: [unfilled] [___] : Post-Dive: Value - [unfilled] mg/dL [____] : Post-Dive: Time - [unfilled] [Clear all fields] : clear all fields [] : No [FreeTextEntry3] : 90 [FreeTextEntry5] : 8194 [FreeTextEntry9] : 1890 [FreeTextEntry7] : 5156 [de-identified] : 0956 [de-identified] : 108 min

## 2020-05-21 ENCOUNTER — OUTPATIENT (OUTPATIENT)
Dept: OUTPATIENT SERVICES | Facility: HOSPITAL | Age: 45
LOS: 1 days | Discharge: HOME | End: 2020-05-21
Payer: COMMERCIAL

## 2020-05-21 ENCOUNTER — APPOINTMENT (OUTPATIENT)
Dept: HYPERBARIC MEDICINE | Facility: HOSPITAL | Age: 45
End: 2020-05-21
Payer: COMMERCIAL

## 2020-05-21 VITALS
HEIGHT: 67 IN | HEART RATE: 77 BPM | TEMPERATURE: 98.1 F | RESPIRATION RATE: 20 BRPM | WEIGHT: 176 LBS | BODY MASS INDEX: 27.62 KG/M2 | SYSTOLIC BLOOD PRESSURE: 138 MMHG | OXYGEN SATURATION: 100 % | DIASTOLIC BLOOD PRESSURE: 75 MMHG

## 2020-05-21 DIAGNOSIS — I77.0 ARTERIOVENOUS FISTULA, ACQUIRED: Chronic | ICD-10-CM

## 2020-05-21 DIAGNOSIS — E11.621 TYPE 2 DIABETES MELLITUS WITH FOOT ULCER: ICD-10-CM

## 2020-05-21 PROCEDURE — 99213 OFFICE O/P EST LOW 20 MIN: CPT

## 2020-05-21 PROCEDURE — G0463: CPT

## 2020-05-21 NOTE — REVIEW OF SYSTEMS
[Fever] : no fever [Chills] : no chills [Loss Of Hearing] : no hearing loss [Shortness Of Breath] : no shortness of breath [Abdominal Pain] : no abdominal pain [Vomiting] : no vomiting [Arthralgias] : arthralgias [Joint Stiffness] : joint stiffness [Skin Wound] : skin wound [Anxiety] : no anxiety [Easy Bleeding] : no tendency for easy bleeding [FreeTextEntry5] : diabetic small vessel diseas and cardio vascular disease  [de-identified] : DFU 3 plantar posterior left and right heel down to skin, subcutaneous tissue, and fat , necrotic  [de-identified] : IDDM , patient on Dialysis  [de-identified] : IDDM with neuropathy

## 2020-05-21 NOTE — HISTORY OF PRESENT ILLNESS
[FreeTextEntry1] : 43 yo WM, here for his weekly assessment. Receiving HBO tx for bilateral heel DFU, Roberto's gr. 3.

## 2020-05-21 NOTE — PHYSICAL EXAM
[4 x 4] : 4 x 4  [JVD] : no jugular venous distention  [Normal Thyroid] : the thyroid was normal [Normal Breath Sounds] : Normal breath sounds [Normal Heart Sounds] : normal heart sounds [Normal Rate and Rhythm] : normal rate and rhythm [Ankle Swelling (On Exam)] : present [Ankle Swelling Bilaterally] : bilaterally  [Varicose Veins Of Lower Extremities] : not present [Ankle Swelling On The Right] : mild [] : not present [Abdomen Tenderness] : ~T ~M No abdominal tenderness [Abdomen Masses] : No abdominal massess [Alert] : alert [Tender] : nontender [Enlarged] : not enlarged [Oriented to Place] : oriented to place [Oriented to Person] : oriented to person [Oriented to Time] : oriented to time [Calm] : calm [de-identified] : adult WM, NAD, WD, WN, alert, Ox 3. [FreeTextEntry1] : Left Posterior Heel [FreeTextEntry2] : 0.7 [FreeTextEntry3] : 0.8 [FreeTextEntry4] : 0.5 [de-identified] : Serosanguineous [de-identified] : Callus [de-identified] : Silver Alginate [FreeTextEntry7] : Right Posterior Heel [de-identified] : Cleansed with Normal Saline\par  [FreeTextEntry8] : 1.1 [FreeTextEntry9] : 0.9 [de-identified] : Callus [de-identified] : Serosanguineous [de-identified] : 0.6 [de-identified] : 1-10% [de-identified] : Silver Alginate [de-identified] : Cleansed with Normal Saline\par  [TWNoteComboBox4] : Small [TWNoteComboBox5] : No [de-identified] : No [de-identified] : None [de-identified] : None [de-identified] : 100% [de-identified] : No [de-identified] : 3x Weekly [de-identified] : Small [de-identified] : No [de-identified] : No [de-identified] : None [de-identified] : None [de-identified] : Yes [de-identified] : 100% [de-identified] : 3x Weekly

## 2020-05-21 NOTE — ASSESSMENT
[Verbal] : Verbal [Patient] : Patient [Good - alert, interested, motivated] : Good - alert, interested, motivated [Verbalizes knowledge/Understanding] : Verbalizes knowledge/understanding [Dressing changes] : dressing changes [Foot Care] : foot care [Skin Care] : skin care [Pressure relief] : pressure relief [Signs and symptoms of infection] : sign and symptoms of infection [Hyperbaric Therapy] : hyperbaric therapy [How and When to Call] : how and when to call [Off-loading] : off-loading [Patient responsibility to plan of care] : patient responsibility to plan of care [Home] : Home [Stable] : stable [Wheelchair] : Wheelchair [Not Applicable - Long Term Care/Home Health Agency] : Long Term Care/Home Health Agency: Not Applicable [] : No [FreeTextEntry2] : Restore Optimal Skin Integrity \par Infection Control\par HBOT\par Localized Wound Care \par Develop Realistic expectations and measurable treatments nursing POC to reduce, eliminate or develop acceptable pain limit tolerance [FreeTextEntry4] : Pt completed 34/40 HBOT, additional 10 ordered \par F/U to [facility] for HBOT & 1 week assessment

## 2020-05-22 ENCOUNTER — OUTPATIENT (OUTPATIENT)
Dept: OUTPATIENT SERVICES | Facility: HOSPITAL | Age: 45
LOS: 1 days | Discharge: HOME | End: 2020-05-22
Payer: COMMERCIAL

## 2020-05-22 ENCOUNTER — APPOINTMENT (OUTPATIENT)
Dept: HYPERBARIC MEDICINE | Facility: HOSPITAL | Age: 45
End: 2020-05-22
Payer: COMMERCIAL

## 2020-05-22 VITALS
OXYGEN SATURATION: 100 % | RESPIRATION RATE: 18 BRPM | TEMPERATURE: 98 F | DIASTOLIC BLOOD PRESSURE: 83 MMHG | SYSTOLIC BLOOD PRESSURE: 186 MMHG | HEART RATE: 80 BPM

## 2020-05-22 VITALS
HEART RATE: 78 BPM | TEMPERATURE: 97.7 F | DIASTOLIC BLOOD PRESSURE: 76 MMHG | SYSTOLIC BLOOD PRESSURE: 151 MMHG | RESPIRATION RATE: 18 BRPM | OXYGEN SATURATION: 18 %

## 2020-05-22 DIAGNOSIS — E11.621 TYPE 2 DIABETES MELLITUS WITH FOOT ULCER: ICD-10-CM

## 2020-05-22 DIAGNOSIS — I77.0 ARTERIOVENOUS FISTULA, ACQUIRED: Chronic | ICD-10-CM

## 2020-05-22 PROCEDURE — G0277: CPT

## 2020-05-22 PROCEDURE — 82962 GLUCOSE BLOOD TEST: CPT

## 2020-05-22 PROCEDURE — 99183 HYPERBARIC OXYGEN THERAPY: CPT

## 2020-05-23 DIAGNOSIS — Z79.4 LONG TERM (CURRENT) USE OF INSULIN: ICD-10-CM

## 2020-05-23 DIAGNOSIS — E10.621 TYPE 1 DIABETES MELLITUS WITH FOOT ULCER: ICD-10-CM

## 2020-05-23 DIAGNOSIS — Z98.49 CATARACT EXTRACTION STATUS, UNSPECIFIED EYE: ICD-10-CM

## 2020-05-23 DIAGNOSIS — E10.29 TYPE 1 DIABETES MELLITUS WITH OTHER DIABETIC KIDNEY COMPLICATION: ICD-10-CM

## 2020-05-23 DIAGNOSIS — Z79.899 OTHER LONG TERM (CURRENT) DRUG THERAPY: ICD-10-CM

## 2020-05-23 DIAGNOSIS — L97.422 NON-PRESSURE CHRONIC ULCER OF LEFT HEEL AND MIDFOOT WITH FAT LAYER EXPOSED: ICD-10-CM

## 2020-05-23 DIAGNOSIS — N19 UNSPECIFIED KIDNEY FAILURE: ICD-10-CM

## 2020-05-23 DIAGNOSIS — E10.40 TYPE 1 DIABETES MELLITUS WITH DIABETIC NEUROPATHY, UNSPECIFIED: ICD-10-CM

## 2020-05-23 DIAGNOSIS — I83.93 ASYMPTOMATIC VARICOSE VEINS OF BILATERAL LOWER EXTREMITIES: ICD-10-CM

## 2020-05-23 DIAGNOSIS — Z99.2 DEPENDENCE ON RENAL DIALYSIS: ICD-10-CM

## 2020-05-23 DIAGNOSIS — Z89.422 ACQUIRED ABSENCE OF OTHER LEFT TOE(S): ICD-10-CM

## 2020-05-23 DIAGNOSIS — L97.412 NON-PRESSURE CHRONIC ULCER OF RIGHT HEEL AND MIDFOOT WITH FAT LAYER EXPOSED: ICD-10-CM

## 2020-05-26 ENCOUNTER — APPOINTMENT (OUTPATIENT)
Dept: HYPERBARIC MEDICINE | Facility: HOSPITAL | Age: 45
End: 2020-05-26
Payer: COMMERCIAL

## 2020-05-26 ENCOUNTER — OUTPATIENT (OUTPATIENT)
Dept: OUTPATIENT SERVICES | Facility: HOSPITAL | Age: 45
LOS: 1 days | Discharge: HOME | End: 2020-05-26
Payer: COMMERCIAL

## 2020-05-26 VITALS
SYSTOLIC BLOOD PRESSURE: 137 MMHG | HEART RATE: 68 BPM | DIASTOLIC BLOOD PRESSURE: 76 MMHG | TEMPERATURE: 97 F | RESPIRATION RATE: 16 BRPM | OXYGEN SATURATION: 99 %

## 2020-05-26 VITALS
DIASTOLIC BLOOD PRESSURE: 64 MMHG | TEMPERATURE: 98.6 F | SYSTOLIC BLOOD PRESSURE: 109 MMHG | HEART RATE: 96 BPM | RESPIRATION RATE: 16 BRPM | OXYGEN SATURATION: 99 %

## 2020-05-26 DIAGNOSIS — L97.412 NON-PRESSURE CHRONIC ULCER OF RIGHT HEEL AND MIDFOOT WITH FAT LAYER EXPOSED: ICD-10-CM

## 2020-05-26 DIAGNOSIS — E11.621 TYPE 2 DIABETES MELLITUS WITH FOOT ULCER: ICD-10-CM

## 2020-05-26 DIAGNOSIS — E10.621 TYPE 1 DIABETES MELLITUS WITH FOOT ULCER: ICD-10-CM

## 2020-05-26 DIAGNOSIS — I77.0 ARTERIOVENOUS FISTULA, ACQUIRED: Chronic | ICD-10-CM

## 2020-05-26 DIAGNOSIS — Z79.4 LONG TERM (CURRENT) USE OF INSULIN: ICD-10-CM

## 2020-05-26 DIAGNOSIS — L97.422 NON-PRESSURE CHRONIC ULCER OF LEFT HEEL AND MIDFOOT WITH FAT LAYER EXPOSED: ICD-10-CM

## 2020-05-26 PROCEDURE — G0277: CPT

## 2020-05-26 PROCEDURE — 82962 GLUCOSE BLOOD TEST: CPT

## 2020-05-26 PROCEDURE — 99183 HYPERBARIC OXYGEN THERAPY: CPT

## 2020-05-26 NOTE — ASSESSMENT
[Patient undergoing HBO treatment for __________] : Patient undergoing HBO treatment for [unfilled] [Patient descended without problem for 9 minutes] : Patient descended without problem for 9 minutes [No dizziness or thirst] :  No dizziness or thirst [No ear problems] : No ear problems [Vital signs stable] : Vital signs stable [Tolerating dive well] : Tolerating dive well [No Chest Pain, shortness of breath] : No Chest Pain, shortness of breath [Respiratory Rate Stable] : Respiratory Rate Stable [No chest pain, shortness of breath, or ear pain] :  No chest pain, shortness of breath, or ear pain  [Tolerated Ascent well] : Tolerated Ascent well [Vital Signs stable] : Vital Signs stable [A physician was present throughout the entire HBOT] : A physician was present throughout the entire HBOT [Clinically Stable] : Clinically stable [No] : No [Continue Treatment Plan] : Continue treatment plan [0] : 0 out of 10

## 2020-05-27 ENCOUNTER — APPOINTMENT (OUTPATIENT)
Dept: HYPERBARIC MEDICINE | Facility: HOSPITAL | Age: 45
End: 2020-05-27
Payer: COMMERCIAL

## 2020-05-27 ENCOUNTER — OUTPATIENT (OUTPATIENT)
Dept: OUTPATIENT SERVICES | Facility: HOSPITAL | Age: 45
LOS: 1 days | Discharge: HOME | End: 2020-05-27
Payer: COMMERCIAL

## 2020-05-27 VITALS — DIASTOLIC BLOOD PRESSURE: 88 MMHG | SYSTOLIC BLOOD PRESSURE: 180 MMHG

## 2020-05-27 VITALS
HEART RATE: 98 BPM | OXYGEN SATURATION: 99 % | DIASTOLIC BLOOD PRESSURE: 79 MMHG | SYSTOLIC BLOOD PRESSURE: 163 MMHG | RESPIRATION RATE: 16 BRPM | TEMPERATURE: 98.4 F

## 2020-05-27 VITALS
OXYGEN SATURATION: 99 % | SYSTOLIC BLOOD PRESSURE: 190 MMHG | DIASTOLIC BLOOD PRESSURE: 95 MMHG | RESPIRATION RATE: 16 BRPM | TEMPERATURE: 97.9 F | HEART RATE: 80 BPM

## 2020-05-27 DIAGNOSIS — Z79.4 LONG TERM (CURRENT) USE OF INSULIN: ICD-10-CM

## 2020-05-27 DIAGNOSIS — E11.621 TYPE 2 DIABETES MELLITUS WITH FOOT ULCER: ICD-10-CM

## 2020-05-27 DIAGNOSIS — L97.412 NON-PRESSURE CHRONIC ULCER OF RIGHT HEEL AND MIDFOOT WITH FAT LAYER EXPOSED: ICD-10-CM

## 2020-05-27 DIAGNOSIS — L97.422 NON-PRESSURE CHRONIC ULCER OF LEFT HEEL AND MIDFOOT WITH FAT LAYER EXPOSED: ICD-10-CM

## 2020-05-27 DIAGNOSIS — I77.0 ARTERIOVENOUS FISTULA, ACQUIRED: Chronic | ICD-10-CM

## 2020-05-27 DIAGNOSIS — E10.621 TYPE 1 DIABETES MELLITUS WITH FOOT ULCER: ICD-10-CM

## 2020-05-27 PROCEDURE — 99183 HYPERBARIC OXYGEN THERAPY: CPT

## 2020-05-27 PROCEDURE — 82962 GLUCOSE BLOOD TEST: CPT

## 2020-05-27 PROCEDURE — G0277: CPT

## 2020-05-27 NOTE — ASSESSMENT
[No change from previous assessment] : No change from previous assessment [Time MD/Provider assessed Patient:_______] : Time MD/Provider assessed Patient: [unfilled] [Patient prepared for dive] : Patient prepared for dive [Patient undergoing HBO treatment for __________] : Patient undergoing HBO treatment for [unfilled] [Patient descended without problem for 9 minutes] : Patient descended without problem for 9 minutes [No dizziness or thirst] :  No dizziness or thirst [No ear problems] : No ear problems [Vital signs stable] : Vital signs stable [No Chest Pain, shortness of breath] : No Chest Pain, shortness of breath [Tolerating dive well] : Tolerating dive well [No chest pain, shortness of breath, or ear pain] :  No chest pain, shortness of breath, or ear pain  [Respiratory Rate Stable] : Respiratory Rate Stable [Tolerated Ascent well] : Tolerated Ascent well [Vital Signs stable] : Vital Signs stable [A physician was present throughout the entire HBOT] : A physician was present throughout the entire HBOT [No] : No [Continue Treatment Plan] : Continue treatment plan [Clinically Stable] : Clinically stable

## 2020-05-27 NOTE — ADDENDUM
[FreeTextEntry1] : No drainage noted on Pt's bandage. \par Pt descended to 2.0 RIVAS @ 1.75 PSI/min without incident in chamber #1. \par Pt resting @ depth with chest rise and fall observed throughout tx. \par Pt ascended from 2.0 RIVAS @ 1.75 PSI/min without incident. \par Pt tolerated tx well. Pt received wound care post HBO tx.

## 2020-05-27 NOTE — PROCEDURE
[Outpatient] : Outpatient [Ambulatory] : Patient is ambulatory. [THIS CHAMBER HAS BEEN CLEANED / DISINFECTED] : This chamber has been cleaned / disinfected according to local and hospital policy and procedure prior to this treatment. [Patient demonstrated and verbalized proper technique for using air break mask] : Patient demonstrated and verbalized proper technique for using air break mask [Patient educated on the risks of SMOKING prior to HBOT with understanding] : Patient educated on the risks of SMOKING prior to HBOT with understanding [Patient educated on the risks of CONSUMING ALCOHOL prior to HBOT with understanding] : Patient educated on the risks of CONSUMING ALCOHOL prior to HBOT with understanding [100% Cotton] : 100% cotton [Empty all pockets] : empty all pockets [No hair oils, wigs, hairpieces, pins] : no hair oils, wigs, hairpieces, pins  [Pre tx medications] : pre tx medications  [No make-up, creams] : no make-up, creams  [No jewelry] : no jewelry  [Hearing aid removed] : hearing aid removed [No matches, cigarettes, lighters] : no matches, cigarettes, lighters  [Dentures removed] : dentures removed [Ground bracelet on pt's wrist] : ground bracelet on pt's wrist  [Contacts removed] : contacts removed  [Remove nail polish] : remove nail polish  [No reading material] : no reading material  [Bra, undergarments removed] : bra, undergarments removed  [No contraindicated dressings] : no contraindicated dressings [Ground Wire - VISUAL Verification - Intact/Free of Obstruction] : Ground Wire - VISUAL Verification - Intact/Free of Obstruction  [Ground Continuity - Verified < 1 ohm w/ Wrist Strap Barb] : Ground Continuity - Verified < 1 ohm w/ Wrist Strap Barb [Number: ___] : Number: [unfilled] [Diagnosis: ___] : Diagnosis: [unfilled] [____] : Post-Dive: Time - [unfilled] [___] : Post-Dive: Value - [unfilled] mg/dL [Clear all fields] : clear all fields [] : No [FreeTextEntry1] : 2.0 [FreeTextEntry3] : 90 mins [FreeTextEntry5] : 6626 [FreeTextEntry9] : 5184 [FreeTextEntry7] : 6813 [de-identified] : 1003 [de-identified] : 108 mins

## 2020-05-27 NOTE — PROCEDURE
[Outpatient] : Outpatient [Wheelchair] : wheelchair [THIS CHAMBER HAS BEEN CLEANED / DISINFECTED] : This chamber has been cleaned / disinfected according to local and hospital policy and procedure prior to this treatment. [Patient demonstrated and verbalized proper technique for using air break mask] : Patient demonstrated and verbalized proper technique for using air break mask [Patient educated on the risks of SMOKING prior to HBOT with understanding] : Patient educated on the risks of SMOKING prior to HBOT with understanding [Patient educated on the risks of CONSUMING ALCOHOL prior to HBOT with understanding] : Patient educated on the risks of CONSUMING ALCOHOL prior to HBOT with understanding [100% Cotton] : 100% cotton [Empty all pockets] : empty all pockets [No hair oils, wigs, hairpieces, pins] : no hair oils, wigs, hairpieces, pins  [Pre tx medications] : pre tx medications  [No make-up, creams] : no make-up, creams  [No jewelry] : no jewelry  [No matches, cigarettes, lighters] : no matches, cigarettes, lighters  [Hearing aid removed] : hearing aid removed [Dentures removed] : dentures removed [Ground bracelet on pt's wrist] : ground bracelet on pt's wrist  [Contacts removed] : contacts removed  [Remove nail polish] : remove nail polish  [No reading material] : no reading material  [Bra, undergarments removed] : bra, undergarments removed  [No contraindicated dressings] : no contraindicated dressings [Ground Wire - VISUAL Verification - Intact/Free of Obstruction] : Ground Wire - VISUAL Verification - Intact/Free of Obstruction  [Ground Continuity - Verified < 1 ohm w/ Wrist Strap Barb] : Ground Continuity - Verified < 1 ohm w/ Wrist Strap Barb [Number: ___] : Number: [unfilled] [Diagnosis: ___] : Diagnosis: [unfilled] [Ambulatory] : Patient is ambulatory. [____] : Post-Dive: Time - [unfilled] [___] : Post-Dive: Value - [unfilled] mg/dL [Clear all fields] : clear all fields [FreeTextEntry3] : 90 [] : No [FreeTextEntry7] : 2293 [FreeTextEntry5] : 0130 [FreeTextEntry9] : 1869 [de-identified] : 0933 [de-identified] : 108 min

## 2020-05-27 NOTE — ADDENDUM
[FreeTextEntry1] : Drainage noted as per  LPN  pt to receive WC post. \par Pt descended to 2.0 RIVAS @ 1.75PSI/min without incident in chamber. \par Pt resting @ depth with chest rise and fall observed by chamber side. \par Pt ascended from 2.0 RIVAS @ 1.75 PSI/min without incident. \par Pt tolerated tx well. pt received WC by RN post tx. \par

## 2020-05-28 ENCOUNTER — OUTPATIENT (OUTPATIENT)
Dept: OUTPATIENT SERVICES | Facility: HOSPITAL | Age: 45
LOS: 1 days | Discharge: HOME | End: 2020-05-28
Payer: COMMERCIAL

## 2020-05-28 ENCOUNTER — APPOINTMENT (OUTPATIENT)
Dept: HYPERBARIC MEDICINE | Facility: HOSPITAL | Age: 45
End: 2020-05-28
Payer: COMMERCIAL

## 2020-05-28 VITALS
HEART RATE: 84 BPM | OXYGEN SATURATION: 99 % | DIASTOLIC BLOOD PRESSURE: 83 MMHG | SYSTOLIC BLOOD PRESSURE: 172 MMHG | RESPIRATION RATE: 20 BRPM | TEMPERATURE: 97.9 F

## 2020-05-28 DIAGNOSIS — I77.0 ARTERIOVENOUS FISTULA, ACQUIRED: Chronic | ICD-10-CM

## 2020-05-28 DIAGNOSIS — E11.621 TYPE 2 DIABETES MELLITUS WITH FOOT ULCER: ICD-10-CM

## 2020-05-28 PROCEDURE — 99213 OFFICE O/P EST LOW 20 MIN: CPT

## 2020-05-28 PROCEDURE — G0463: CPT

## 2020-05-28 NOTE — ADDENDUM
[FreeTextEntry1] : Drainage noted LPN notified. pt to receive dressing change post tx. \par Pt descended to 2.0 RIVAS@ 1.75psi/min without  incident. \par Pt resting@  depth with equal chest rise and fall observed by chamberside. \par pt ascended from 2.0 RIVAS  @ 1.75psi/min  without incident. \par pt tolerated tx well. Pt received Wc by RN post tx .

## 2020-05-28 NOTE — PHYSICAL EXAM
[Normal Breath Sounds] : Normal breath sounds [Normal Rate and Rhythm] : normal rate and rhythm [Normal Heart Sounds] : normal heart sounds [Alert] : alert [Oriented to Person] : oriented to person [Calm] : calm [Oriented to Time] : oriented to time [Oriented to Place] : oriented to place [Abdominal Pad] : Abdominal Pad [4 x 4] : 4 x 4  [JVD] : no jugular venous distention  [Abdomen Tenderness] : ~T ~M No abdominal tenderness [Abdomen Masses] : No abdominal massess [Tender] : nontender [Enlarged] : not enlarged [de-identified] : adult HM, NAD, WD, WN, alert, Ox3. [FreeTextEntry2] : 3.5 [FreeTextEntry1] : Left posterior heel  [FreeTextEntry4] : 0.8 [FreeTextEntry3] : 2 [de-identified] : Serous/sanguinous [de-identified] : Hard callus  [de-identified] : Silver alginate [de-identified] : Cleansed with NS\par Kerlix  [FreeTextEntry7] : Right posterior heel  [FreeTextEntry8] : 2.7 [FreeTextEntry9] : 3 [de-identified] : 0.5 [de-identified] : Serous/sanguinous [de-identified] : Silver alginate [de-identified] : Hard Callus  [de-identified] : Cleansed with NS\par Kerlix  [de-identified] : Mild [de-identified] : None [TWNoteComboBox4] : Moderate [de-identified] : >75% [de-identified] : 3x Weekly [de-identified] : Primary Dressing [de-identified] : Mild [de-identified] : Moderate [de-identified] : No [de-identified] : 100% [de-identified] : None [de-identified] : Primary Dressing [de-identified] : 3x Weekly

## 2020-05-28 NOTE — ASSESSMENT
[Verbal] : Verbal [Written] : Written [Demo] : Demo [Patient] : Patient [Good - alert, interested, motivated] : Good - alert, interested, motivated [Verbalizes knowledge/Understanding] : Verbalizes knowledge/understanding [Foot Care] : foot care [Dressing changes] : dressing changes [Signs and symptoms of infection] : sign and symptoms of infection [Skin Care] : skin care [Nutrition] : nutrition [Pain Management] : pain management [How and When to Call] : how and when to call [Hyperbaric Therapy] : hyperbaric therapy [Patient responsibility to plan of care] : patient responsibility to plan of care [Off-loading] : off-loading [Home] : Home [Stable] : stable [Not Applicable - Long Term Care/Home Health Agency] : Long Term Care/Home Health Agency: Not Applicable [Wheelchair] : Wheelchair [] : No [FreeTextEntry3] : Large amount of callus surrounding jennifer wound. [FreeTextEntry2] : Infection prevention\par Localized wound care\par Offloading \par Promote optimal nutrition \par Hyperbaric oxygen therapy \par  \par \par  [FreeTextEntry4] : 37/50 HBO completed \par Auth submitted for possible debridement by [facility] 6/1/20\par Patient will not be here tomorrow due to a doctor appointment \par Continue HBO as ordered.

## 2020-05-28 NOTE — PROCEDURE
[Ambulatory] : Patient is ambulatory. [Outpatient] : Outpatient [THIS CHAMBER HAS BEEN CLEANED / DISINFECTED] : This chamber has been cleaned / disinfected according to local and hospital policy and procedure prior to this treatment. [Patient demonstrated and verbalized proper technique for using air break mask] : Patient demonstrated and verbalized proper technique for using air break mask [Patient educated on the risks of SMOKING prior to HBOT with understanding] : Patient educated on the risks of SMOKING prior to HBOT with understanding [Patient educated on the risks of CONSUMING ALCOHOL prior to HBOT with understanding] : Patient educated on the risks of CONSUMING ALCOHOL prior to HBOT with understanding [Empty all pockets] : empty all pockets [100% Cotton] : 100% cotton [No hair oils, wigs, hairpieces, pins] : no hair oils, wigs, hairpieces, pins  [Pre tx medications] : pre tx medications  [No jewelry] : no jewelry  [No make-up, creams] : no make-up, creams  [Hearing aid removed] : hearing aid removed [No matches, cigarettes, lighters] : no matches, cigarettes, lighters  [Dentures removed] : dentures removed [Ground bracelet on pt's wrist] : ground bracelet on pt's wrist  [Contacts removed] : contacts removed  [Remove nail polish] : remove nail polish  [No reading material] : no reading material  [Bra, undergarments removed] : bra, undergarments removed  [Ground Wire - VISUAL Verification - Intact/Free of Obstruction] : Ground Wire - VISUAL Verification - Intact/Free of Obstruction  [No contraindicated dressings] : no contraindicated dressings [Ground Continuity - Verified < 1 ohm w/ Wrist Strap Barb] : Ground Continuity - Verified < 1 ohm w/ Wrist Strap Barb [Diagnosis: ___] : Diagnosis: [unfilled] [Number: ___] : Number: [unfilled] [____] : Post-Dive: Time - [unfilled] [___] : Post-Dive: Value - [unfilled] mg/dL [Clear all fields] : clear all fields [] : No [FreeTextEntry1] : 2.0 [FreeTextEntry3] : 90 mins [FreeTextEntry5] : 3411 [FreeTextEntry7] : 1089 [FreeTextEntry9] : 9240 [de-identified] : 0947 [de-identified] : 108 mins

## 2020-05-28 NOTE — HISTORY OF PRESENT ILLNESS
[FreeTextEntry1] : 43 yo HF, here for his weekly assessment. Pt presently receiving HBO tx for gr3 DFU involving bilateral heels. Tolerating tx well.

## 2020-05-29 ENCOUNTER — APPOINTMENT (OUTPATIENT)
Dept: HYPERBARIC MEDICINE | Facility: HOSPITAL | Age: 45
End: 2020-05-29

## 2020-05-31 DIAGNOSIS — Z99.2 DEPENDENCE ON RENAL DIALYSIS: ICD-10-CM

## 2020-05-31 DIAGNOSIS — I83.93 ASYMPTOMATIC VARICOSE VEINS OF BILATERAL LOWER EXTREMITIES: ICD-10-CM

## 2020-05-31 DIAGNOSIS — L97.422 NON-PRESSURE CHRONIC ULCER OF LEFT HEEL AND MIDFOOT WITH FAT LAYER EXPOSED: ICD-10-CM

## 2020-05-31 DIAGNOSIS — Z98.49 CATARACT EXTRACTION STATUS, UNSPECIFIED EYE: ICD-10-CM

## 2020-05-31 DIAGNOSIS — E10.621 TYPE 1 DIABETES MELLITUS WITH FOOT ULCER: ICD-10-CM

## 2020-05-31 DIAGNOSIS — L97.412 NON-PRESSURE CHRONIC ULCER OF RIGHT HEEL AND MIDFOOT WITH FAT LAYER EXPOSED: ICD-10-CM

## 2020-05-31 DIAGNOSIS — E10.40 TYPE 1 DIABETES MELLITUS WITH DIABETIC NEUROPATHY, UNSPECIFIED: ICD-10-CM

## 2020-05-31 DIAGNOSIS — Z89.422 ACQUIRED ABSENCE OF OTHER LEFT TOE(S): ICD-10-CM

## 2020-05-31 DIAGNOSIS — Z79.899 OTHER LONG TERM (CURRENT) DRUG THERAPY: ICD-10-CM

## 2020-05-31 DIAGNOSIS — Z79.4 LONG TERM (CURRENT) USE OF INSULIN: ICD-10-CM

## 2020-05-31 DIAGNOSIS — E10.29 TYPE 1 DIABETES MELLITUS WITH OTHER DIABETIC KIDNEY COMPLICATION: ICD-10-CM

## 2020-05-31 DIAGNOSIS — N19 UNSPECIFIED KIDNEY FAILURE: ICD-10-CM

## 2020-06-01 ENCOUNTER — APPOINTMENT (OUTPATIENT)
Dept: HYPERBARIC MEDICINE | Facility: HOSPITAL | Age: 45
End: 2020-06-01

## 2020-06-02 ENCOUNTER — APPOINTMENT (OUTPATIENT)
Dept: HYPERBARIC MEDICINE | Facility: HOSPITAL | Age: 45
End: 2020-06-02
Payer: COMMERCIAL

## 2020-06-02 ENCOUNTER — OUTPATIENT (OUTPATIENT)
Dept: OUTPATIENT SERVICES | Facility: HOSPITAL | Age: 45
LOS: 1 days | Discharge: HOME | End: 2020-06-02
Payer: COMMERCIAL

## 2020-06-02 VITALS
HEART RATE: 81 BPM | OXYGEN SATURATION: 99 % | SYSTOLIC BLOOD PRESSURE: 104 MMHG | DIASTOLIC BLOOD PRESSURE: 61 MMHG | RESPIRATION RATE: 16 BRPM | TEMPERATURE: 98.6 F

## 2020-06-02 VITALS
OXYGEN SATURATION: 100 % | HEART RATE: 76 BPM | RESPIRATION RATE: 18 BRPM | SYSTOLIC BLOOD PRESSURE: 140 MMHG | TEMPERATURE: 97.2 F | DIASTOLIC BLOOD PRESSURE: 80 MMHG

## 2020-06-02 DIAGNOSIS — L97.422 NON-PRESSURE CHRONIC ULCER OF LEFT HEEL AND MIDFOOT WITH FAT LAYER EXPOSED: ICD-10-CM

## 2020-06-02 DIAGNOSIS — I77.0 ARTERIOVENOUS FISTULA, ACQUIRED: Chronic | ICD-10-CM

## 2020-06-02 DIAGNOSIS — E10.621 TYPE 1 DIABETES MELLITUS WITH FOOT ULCER: ICD-10-CM

## 2020-06-02 DIAGNOSIS — E11.621 TYPE 2 DIABETES MELLITUS WITH FOOT ULCER: ICD-10-CM

## 2020-06-02 DIAGNOSIS — Z79.4 LONG TERM (CURRENT) USE OF INSULIN: ICD-10-CM

## 2020-06-02 DIAGNOSIS — L97.412 NON-PRESSURE CHRONIC ULCER OF RIGHT HEEL AND MIDFOOT WITH FAT LAYER EXPOSED: ICD-10-CM

## 2020-06-02 PROCEDURE — 82962 GLUCOSE BLOOD TEST: CPT

## 2020-06-02 PROCEDURE — G0277: CPT

## 2020-06-02 PROCEDURE — 99183 HYPERBARIC OXYGEN THERAPY: CPT

## 2020-06-02 NOTE — PROCEDURE
[Outpatient] : Outpatient [Wheelchair] : wheelchair [THIS CHAMBER HAS BEEN CLEANED / DISINFECTED] : This chamber has been cleaned / disinfected according to local and hospital policy and procedure prior to this treatment. [Patient demonstrated and verbalized proper technique for using air break mask] : Patient demonstrated and verbalized proper technique for using air break mask [Patient educated on the risks of SMOKING prior to HBOT with understanding] : Patient educated on the risks of SMOKING prior to HBOT with understanding [Patient educated on the risks of CONSUMING ALCOHOL prior to HBOT with understanding] : Patient educated on the risks of CONSUMING ALCOHOL prior to HBOT with understanding [100% Cotton] : 100% cotton [Empty all pockets] : empty all pockets [No hair oils, wigs, hairpieces, pins] : no hair oils, wigs, hairpieces, pins  [Pre tx medications] : pre tx medications  [No make-up, creams] : no make-up, creams  [No jewelry] : no jewelry  [No matches, cigarettes, lighters] : no matches, cigarettes, lighters  [Dentures removed] : dentures removed [Hearing aid removed] : hearing aid removed [Contacts removed] : contacts removed  [Ground bracelet on pt's wrist] : ground bracelet on pt's wrist  [Remove nail polish] : remove nail polish  [No reading material] : no reading material  [Bra, undergarments removed] : bra, undergarments removed  [Ground Wire - VISUAL Verification - Intact/Free of Obstruction] : Ground Wire - VISUAL Verification - Intact/Free of Obstruction  [No contraindicated dressings] : no contraindicated dressings [Ground Continuity - Verified < 1 ohm w/ Wrist Strap Barb] : Ground Continuity - Verified < 1 ohm w/ Wrist Strap Barb [Number: ___] : Number: [unfilled] [Diagnosis: ___] : Diagnosis: [unfilled] [____] : Post-Dive: Time - [unfilled] [___] : Post-Dive: Value - [unfilled] mg/dL [Clear all fields] : clear all fields [] : No [FreeTextEntry3] : 90 [FreeTextEntry5] : 4979 [FreeTextEntry7] : 0372 [FreeTextEntry9] : 9859 [de-identified] : 108 min [de-identified] : 0967

## 2020-06-02 NOTE — ASSESSMENT
[Patient undergoing HBO treatment for __________] : Patient undergoing HBO treatment for [unfilled] [Patient descended without problem for 9 minutes] : Patient descended without problem for 9 minutes [No dizziness or thirst] :  No dizziness or thirst [No ear problems] : No ear problems [Vital signs stable] : Vital signs stable [Tolerating dive well] : Tolerating dive well [Respiratory Rate Stable] : Respiratory Rate Stable [No Chest Pain, shortness of breath] : No Chest Pain, shortness of breath [Tolerated Ascent well] : Tolerated Ascent well [No chest pain, shortness of breath, or ear pain] :  No chest pain, shortness of breath, or ear pain  [Vital Signs stable] : Vital Signs stable [A physician was present throughout the entire HBOT] : A physician was present throughout the entire HBOT [No] : No [Clinically Stable] : Clinically stable [0] : 0 out of 10 [Continue Treatment Plan] : Continue treatment plan

## 2020-06-02 NOTE — ADDENDUM
[FreeTextEntry1] : DRAINAGE NOTED ON PT'S DRESSING PRIOR TO DESCENT. CHRN NOTIFIED. PT TO RECEIVE WOUND CARE POST HBOT.\par \par PT DESCENDED TO 2.0 RIVAS @ 1.75 PSI/MIN WITHOUT  INCIDENT IN CHAMBER # 1. PT'S RESTING AT TX DEPTH WITH WOUNDS OFFLOADED. CHEST RISE AND FALL OBSERVED CHAMBERSIDE.\par Pt ascended from 2.0 RIVAS @ 1.75 PSI/min without incident. \par Pt tolerated tx well. Pt received wound care post HBO tx. \par

## 2020-06-03 ENCOUNTER — OUTPATIENT (OUTPATIENT)
Dept: OUTPATIENT SERVICES | Facility: HOSPITAL | Age: 45
LOS: 1 days | Discharge: HOME | End: 2020-06-03
Payer: COMMERCIAL

## 2020-06-03 ENCOUNTER — APPOINTMENT (OUTPATIENT)
Dept: HYPERBARIC MEDICINE | Facility: HOSPITAL | Age: 45
End: 2020-06-03
Payer: COMMERCIAL

## 2020-06-03 VITALS
HEART RATE: 79 BPM | OXYGEN SATURATION: 100 % | TEMPERATURE: 98.6 F | SYSTOLIC BLOOD PRESSURE: 141 MMHG | RESPIRATION RATE: 20 BRPM | DIASTOLIC BLOOD PRESSURE: 79 MMHG

## 2020-06-03 VITALS
TEMPERATURE: 97.6 F | OXYGEN SATURATION: 100 % | RESPIRATION RATE: 16 BRPM | SYSTOLIC BLOOD PRESSURE: 180 MMHG | HEART RATE: 74 BPM | DIASTOLIC BLOOD PRESSURE: 84 MMHG

## 2020-06-03 DIAGNOSIS — Z79.4 LONG TERM (CURRENT) USE OF INSULIN: ICD-10-CM

## 2020-06-03 DIAGNOSIS — I77.0 ARTERIOVENOUS FISTULA, ACQUIRED: Chronic | ICD-10-CM

## 2020-06-03 DIAGNOSIS — L97.422 NON-PRESSURE CHRONIC ULCER OF LEFT HEEL AND MIDFOOT WITH FAT LAYER EXPOSED: ICD-10-CM

## 2020-06-03 DIAGNOSIS — E10.621 TYPE 1 DIABETES MELLITUS WITH FOOT ULCER: ICD-10-CM

## 2020-06-03 DIAGNOSIS — E11.621 TYPE 2 DIABETES MELLITUS WITH FOOT ULCER: ICD-10-CM

## 2020-06-03 DIAGNOSIS — L97.412 NON-PRESSURE CHRONIC ULCER OF RIGHT HEEL AND MIDFOOT WITH FAT LAYER EXPOSED: ICD-10-CM

## 2020-06-03 PROCEDURE — G0277: CPT

## 2020-06-03 PROCEDURE — 82962 GLUCOSE BLOOD TEST: CPT

## 2020-06-03 PROCEDURE — 99183 HYPERBARIC OXYGEN THERAPY: CPT

## 2020-06-03 NOTE — PROCEDURE
[Ambulatory] : Patient is ambulatory. [Wheelchair] : wheelchair [THIS CHAMBER HAS BEEN CLEANED / DISINFECTED] : This chamber has been cleaned / disinfected according to local and hospital policy and procedure prior to this treatment. [Patient demonstrated and verbalized proper technique for using air break mask] : Patient demonstrated and verbalized proper technique for using air break mask [Patient educated on the risks of SMOKING prior to HBOT with understanding] : Patient educated on the risks of SMOKING prior to HBOT with understanding [Patient educated on the risks of CONSUMING ALCOHOL prior to HBOT with understanding] : Patient educated on the risks of CONSUMING ALCOHOL prior to HBOT with understanding [100% Cotton] : 100% cotton [No hair oils, wigs, hairpieces, pins] : no hair oils, wigs, hairpieces, pins  [Empty all pockets] : empty all pockets [Pre tx medications] : pre tx medications  [No make-up, creams] : no make-up, creams  [No jewelry] : no jewelry  [No matches, cigarettes, lighters] : no matches, cigarettes, lighters  [Hearing aid removed] : hearing aid removed [Dentures removed] : dentures removed [Ground bracelet on pt's wrist] : ground bracelet on pt's wrist  [Contacts removed] : contacts removed  [Remove nail polish] : remove nail polish  [No reading material] : no reading material  [Bra, undergarments removed] : bra, undergarments removed  [No contraindicated dressings] : no contraindicated dressings [Ground Wire - VISUAL Verification - Intact/Free of Obstruction] : Ground Wire - VISUAL Verification - Intact/Free of Obstruction  [Ground Continuity - Verified < 1 ohm w/ Wrist Strap Barb] : Ground Continuity - Verified < 1 ohm w/ Wrist Strap Barb [Number: ___] : Number: [unfilled] [Diagnosis: ___] : Diagnosis: [unfilled] [____] : Post-Dive: Time - [unfilled] [___] : Post-Dive: Value - [unfilled] mg/dL [Clear all fields] : clear all fields [] : No [FreeTextEntry1] : tx depth  [FreeTextEntry5] : 0860 [FreeTextEntry3] : 90 [FreeTextEntry7] : 9829 [de-identified] : 0933 [FreeTextEntry9] : 1814 [de-identified] : 108 mins

## 2020-06-03 NOTE — ADDENDUM
[FreeTextEntry1] : PT ARRIVED VIA WHEEL CHAIR FROM RESIDENCE A&OX3\par Pt descended to depth without incident in chamber # 1. \par Pt is resting @ depth with chest rise and fall observed @ chamber side.\par pt ascended from 2.0 RIVAS@ 1.75psi/min without incident. \par pt tolerated tx well. \par

## 2020-06-03 NOTE — ASSESSMENT
[Patient descended without problem for 9 minutes] : Patient descended without problem for 9 minutes [No dizziness or thirst] :  No dizziness or thirst [No change from previous assessment] : No change from previous assessment [Vital signs stable] : Vital signs stable [No ear problems] : No ear problems [Tolerating dive well] : Tolerating dive well [Respiratory Rate Stable] : Respiratory Rate Stable [No Chest Pain, shortness of breath] : No Chest Pain, shortness of breath [No chest pain, shortness of breath, or ear pain] :  No chest pain, shortness of breath, or ear pain  [Tolerated Ascent well] : Tolerated Ascent well [A physician was present throughout the entire HBOT] : A physician was present throughout the entire HBOT [Vital Signs stable] : Vital Signs stable [Yes] : Yes [Continue Treatment Plan] : Continue treatment plan [Clinically Stable] : Clinically stable [0] : 0 out of 10

## 2020-06-04 ENCOUNTER — APPOINTMENT (OUTPATIENT)
Dept: HYPERBARIC MEDICINE | Facility: HOSPITAL | Age: 45
End: 2020-06-04
Payer: COMMERCIAL

## 2020-06-04 ENCOUNTER — OUTPATIENT (OUTPATIENT)
Dept: OUTPATIENT SERVICES | Facility: HOSPITAL | Age: 45
LOS: 1 days | Discharge: HOME | End: 2020-06-04
Payer: COMMERCIAL

## 2020-06-04 VITALS
HEART RATE: 71 BPM | SYSTOLIC BLOOD PRESSURE: 171 MMHG | DIASTOLIC BLOOD PRESSURE: 86 MMHG | OXYGEN SATURATION: 100 % | RESPIRATION RATE: 18 BRPM | TEMPERATURE: 97.4 F

## 2020-06-04 VITALS
TEMPERATURE: 97.3 F | DIASTOLIC BLOOD PRESSURE: 66 MMHG | HEART RATE: 73 BPM | OXYGEN SATURATION: 100 % | SYSTOLIC BLOOD PRESSURE: 111 MMHG | RESPIRATION RATE: 18 BRPM

## 2020-06-04 DIAGNOSIS — L97.412 NON-PRESSURE CHRONIC ULCER OF RIGHT HEEL AND MIDFOOT WITH FAT LAYER EXPOSED: ICD-10-CM

## 2020-06-04 DIAGNOSIS — E10.621 TYPE 1 DIABETES MELLITUS WITH FOOT ULCER: ICD-10-CM

## 2020-06-04 DIAGNOSIS — E11.621 TYPE 2 DIABETES MELLITUS WITH FOOT ULCER: ICD-10-CM

## 2020-06-04 DIAGNOSIS — Z79.4 LONG TERM (CURRENT) USE OF INSULIN: ICD-10-CM

## 2020-06-04 DIAGNOSIS — I77.0 ARTERIOVENOUS FISTULA, ACQUIRED: Chronic | ICD-10-CM

## 2020-06-04 DIAGNOSIS — L97.422 NON-PRESSURE CHRONIC ULCER OF LEFT HEEL AND MIDFOOT WITH FAT LAYER EXPOSED: ICD-10-CM

## 2020-06-04 PROCEDURE — G0277: CPT

## 2020-06-04 PROCEDURE — 82962 GLUCOSE BLOOD TEST: CPT

## 2020-06-04 PROCEDURE — 99183 HYPERBARIC OXYGEN THERAPY: CPT

## 2020-06-04 NOTE — PROCEDURE
[Outpatient] : Outpatient [Wheelchair] : wheelchair [THIS CHAMBER HAS BEEN CLEANED / DISINFECTED] : This chamber has been cleaned / disinfected according to local and hospital policy and procedure prior to this treatment. [Patient educated on the risks of SMOKING prior to HBOT with understanding] : Patient educated on the risks of SMOKING prior to HBOT with understanding [Patient demonstrated and verbalized proper technique for using air break mask] : Patient demonstrated and verbalized proper technique for using air break mask [Patient educated on the risks of CONSUMING ALCOHOL prior to HBOT with understanding] : Patient educated on the risks of CONSUMING ALCOHOL prior to HBOT with understanding [Empty all pockets] : empty all pockets [100% Cotton] : 100% cotton [No hair oils, wigs, hairpieces, pins] : no hair oils, wigs, hairpieces, pins  [No make-up, creams] : no make-up, creams  [Pre tx medications] : pre tx medications  [No jewelry] : no jewelry  [No matches, cigarettes, lighters] : no matches, cigarettes, lighters  [Hearing aid removed] : hearing aid removed [Dentures removed] : dentures removed [Ground bracelet on pt's wrist] : ground bracelet on pt's wrist  [Contacts removed] : contacts removed  [Remove nail polish] : remove nail polish  [No reading material] : no reading material  [Bra, undergarments removed] : bra, undergarments removed  [No contraindicated dressings] : no contraindicated dressings [Ground Wire - VISUAL Verification - Intact/Free of Obstruction] : Ground Wire - VISUAL Verification - Intact/Free of Obstruction  [Ground Continuity - Verified < 1 ohm w/ Wrist Strap Barb] : Ground Continuity - Verified < 1 ohm w/ Wrist Strap Barb [Number: ___] : Number: [unfilled] [Diagnosis: ___] : Diagnosis: [unfilled] [____] : Post-Dive: Time - [unfilled] [___] : Post-Dive: Value - [unfilled] mg/dL [Clear all fields] : clear all fields [] : No [FreeTextEntry3] : 90 [FreeTextEntry5] : 7151 [FreeTextEntry7] : 8588 [FreeTextEntry9] : 3389 [de-identified] : 0911 [de-identified] : 108 min

## 2020-06-04 NOTE — ADDENDUM
[FreeTextEntry1] : Drainage cleared to be changed tomorrow. Pt's feet offloaded using blanket. \par Pt descended to 2.0 RIVAS @ 1.75 PSI/min without incident in chamber #1.\par Pt resting @ depth with chest rise and fall observed throughout tx.\par Pt ascended from 2.0 RIVAS @ 1.75 PSI/min without incident. \par Pt tolerated tx well.\par

## 2020-06-05 ENCOUNTER — APPOINTMENT (OUTPATIENT)
Dept: HYPERBARIC MEDICINE | Facility: HOSPITAL | Age: 45
End: 2020-06-05
Payer: COMMERCIAL

## 2020-06-05 ENCOUNTER — RESULT REVIEW (OUTPATIENT)
Age: 45
End: 2020-06-05

## 2020-06-05 ENCOUNTER — OUTPATIENT (OUTPATIENT)
Dept: OUTPATIENT SERVICES | Facility: HOSPITAL | Age: 45
LOS: 1 days | Discharge: HOME | End: 2020-06-05
Payer: COMMERCIAL

## 2020-06-05 VITALS
HEART RATE: 75 BPM | TEMPERATURE: 98 F | OXYGEN SATURATION: 100 % | DIASTOLIC BLOOD PRESSURE: 82 MMHG | RESPIRATION RATE: 18 BRPM | SYSTOLIC BLOOD PRESSURE: 142 MMHG

## 2020-06-05 DIAGNOSIS — I77.0 ARTERIOVENOUS FISTULA, ACQUIRED: Chronic | ICD-10-CM

## 2020-06-05 DIAGNOSIS — E11.621 TYPE 2 DIABETES MELLITUS WITH FOOT ULCER: ICD-10-CM

## 2020-06-05 PROCEDURE — 11045 DBRDMT SUBQ TISS EACH ADDL: CPT

## 2020-06-05 PROCEDURE — 11042 DBRDMT SUBQ TIS 1ST 20SQCM/<: CPT

## 2020-06-05 PROCEDURE — 88304 TISSUE EXAM BY PATHOLOGIST: CPT

## 2020-06-05 PROCEDURE — 88304 TISSUE EXAM BY PATHOLOGIST: CPT | Mod: 26

## 2020-06-08 ENCOUNTER — APPOINTMENT (OUTPATIENT)
Dept: HYPERBARIC MEDICINE | Facility: HOSPITAL | Age: 45
End: 2020-06-08

## 2020-06-08 DIAGNOSIS — E10.29 TYPE 1 DIABETES MELLITUS WITH OTHER DIABETIC KIDNEY COMPLICATION: ICD-10-CM

## 2020-06-08 DIAGNOSIS — I83.93 ASYMPTOMATIC VARICOSE VEINS OF BILATERAL LOWER EXTREMITIES: ICD-10-CM

## 2020-06-08 DIAGNOSIS — L97.422 NON-PRESSURE CHRONIC ULCER OF LEFT HEEL AND MIDFOOT WITH FAT LAYER EXPOSED: ICD-10-CM

## 2020-06-08 DIAGNOSIS — N19 UNSPECIFIED KIDNEY FAILURE: ICD-10-CM

## 2020-06-08 DIAGNOSIS — E10.40 TYPE 1 DIABETES MELLITUS WITH DIABETIC NEUROPATHY, UNSPECIFIED: ICD-10-CM

## 2020-06-08 DIAGNOSIS — E10.621 TYPE 1 DIABETES MELLITUS WITH FOOT ULCER: ICD-10-CM

## 2020-06-08 DIAGNOSIS — Z89.422 ACQUIRED ABSENCE OF OTHER LEFT TOE(S): ICD-10-CM

## 2020-06-08 DIAGNOSIS — Z79.899 OTHER LONG TERM (CURRENT) DRUG THERAPY: ICD-10-CM

## 2020-06-08 DIAGNOSIS — Z79.4 LONG TERM (CURRENT) USE OF INSULIN: ICD-10-CM

## 2020-06-08 DIAGNOSIS — Z99.2 DEPENDENCE ON RENAL DIALYSIS: ICD-10-CM

## 2020-06-08 DIAGNOSIS — Z98.49 CATARACT EXTRACTION STATUS, UNSPECIFIED EYE: ICD-10-CM

## 2020-06-08 DIAGNOSIS — L97.412 NON-PRESSURE CHRONIC ULCER OF RIGHT HEEL AND MIDFOOT WITH FAT LAYER EXPOSED: ICD-10-CM

## 2020-06-08 NOTE — PROCEDURE
[Saline] : saline [Slough] : slough [Necrotic] : necrotic [Scalpel] : scalpel [Skin] : skin [Subcutaneous tissue] : subcutaneous tissue [Sent to Lab] : which was entirely removed and was sent to the laboratory for [Pathologic Exam] : pathologic exam [Clean] : clean [Pink] : pink [Pressure] : pressure [FreeTextEntry1] : calcium alginate, dry dressing [FreeTextEntry2] : right and left heels [FreeTextEntry9] : 0903hr [de-identified] : DFU 3 bilateral heels [de-identified] : evangelist [de-identified] : priscilla [FreeTextEntry6] : DFU 3 bilateral heels [FreeTextEntry7] : DFU 3 bilateral heels [de-identified] : 3mL

## 2020-06-08 NOTE — ASSESSMENT
[Verbal] : Verbal [Written] : Written [Patient] : Patient [Demo] : Demo [Needs reinforcement] : needs reinforcement [Fair - mild discomfort, physical impairment, low acceptance] : Fair - mild discomfort, physical impairment, low acceptance [Dressing changes] : dressing changes [Foot Care] : foot care [Signs and symptoms of infection] : sign and symptoms of infection [Skin Care] : skin care [Labs and Tests] : labs and tests [How and When to Call] : how and when to call [Nutrition] : nutrition [Hyperbaric Therapy] : hyperbaric therapy [Pain Management] : pain management [Patient responsibility to plan of care] : patient responsibility to plan of care [Off-loading] : off-loading [Stable] : stable [Home] : Home [Wheelchair] : Wheelchair [Not Applicable - Long Term Care/Home Health Agency] : Long Term Care/Home Health Agency: Not Applicable [FreeTextEntry3] : Patient continues to ambulate on heels despite repeated education not to.  [FreeTextEntry2] : Hyperbaric oxygen therapy \par Infection prevention\par Localized wound care \par Goal of remaining pain free regarding wounds.\par Offloading  [] : No [FreeTextEntry4] : 40/50 HBO completed\par Patient to have weekly assessments and debridements.\par 2 samples sent to pathology \par auth submitted for debridement\par Continue HBOT as ordered. \par

## 2020-06-08 NOTE — PHYSICAL EXAM
[0] : left 0 [1+] : left 1+ [Ankle Swelling On The Right] : mild [Varicose Veins Of Lower Extremities] : bilaterally [No Rash or Lesion] : No rash or lesion [Skin Ulcer] : ulcer [Alert] : alert [Oriented to Place] : oriented to place [Oriented to Person] : oriented to person [Oriented to Time] : oriented to time [Calm] : calm [4 x 4] : 4 x 4  [Abdominal Pad] : Abdominal Pad [Purpura] : no purpura  [Petechiae] : no petechiae [de-identified] : comfortable  [de-identified] : DFU 3 plantar posterior left and right heel down to skin, subcutaneous tissue, and fat [de-identified] : Diabetic small vessel and cardio vascular disease  [de-identified] : previous calcanectomy of the right heel , possible OM of the left heel , OM of the right pinkie finger  [de-identified] : Diabetic neuropathy , Dialysis  3x per week  [FreeTextEntry1] : Left posterior heel  [FreeTextEntry2] : 5 [FreeTextEntry3] : 3.8 [FreeTextEntry4] : 0.3 [de-identified] : Serous/sanguinous [de-identified] : Very hard callus  [de-identified] : Alginate  [de-identified] : Cleansed with NS\par Kerlix \par \par Post debridement measurements: 5.1/3.9/0.3 [FreeTextEntry8] : 5.7 [FreeTextEntry7] : Right posterior heel  [FreeTextEntry9] : 5.3 [de-identified] : Very hard callus  [de-identified] : 0.3 [de-identified] : Cleansed with NS\par Kerlix \par \par Post debridement measurements: 5.8/5.3/0.3 [de-identified] : Alginate  [de-identified] : Small amount of bleeding during procedure. Patient reports no pain pre or post procedure and tolerated well.  [TWNoteComboBox4] : Moderate [de-identified] : Moderate [de-identified] : None [de-identified] : No [de-identified] : >75% [TWNoteComboBox7] : Isabela [de-identified] : 3x Weekly [de-identified] : Primary Dressing [de-identified] : Moderate [de-identified] : None [de-identified] : Moderate [de-identified] : 3x Weekly [de-identified] : No [de-identified] : 50% [de-identified] : Primary Dressing

## 2020-06-08 NOTE — REVIEW OF SYSTEMS
[Arthralgias] : arthralgias [Joint Stiffness] : joint stiffness [Skin Wound] : skin wound [Fever] : no fever [Chills] : no chills [Loss Of Hearing] : no hearing loss [Shortness Of Breath] : no shortness of breath [Abdominal Pain] : no abdominal pain [Vomiting] : no vomiting [Anxiety] : no anxiety [Easy Bleeding] : no tendency for easy bleeding [de-identified] : DFU 3 plantar posterior left and right heel down to skin, subcutaneous tissue, and fat , necrotic  [FreeTextEntry5] : diabetic small vessel diseas and cardio vascular disease  [de-identified] : IDDM with neuropathy  [de-identified] : IDDM , patient on Dialysis

## 2020-06-09 ENCOUNTER — APPOINTMENT (OUTPATIENT)
Dept: HYPERBARIC MEDICINE | Facility: HOSPITAL | Age: 45
End: 2020-06-09
Payer: COMMERCIAL

## 2020-06-09 ENCOUNTER — OUTPATIENT (OUTPATIENT)
Dept: OUTPATIENT SERVICES | Facility: HOSPITAL | Age: 45
LOS: 1 days | Discharge: HOME | End: 2020-06-09
Payer: COMMERCIAL

## 2020-06-09 VITALS
TEMPERATURE: 97.5 F | HEART RATE: 73 BPM | RESPIRATION RATE: 16 BRPM | OXYGEN SATURATION: 99 % | SYSTOLIC BLOOD PRESSURE: 165 MMHG | DIASTOLIC BLOOD PRESSURE: 82 MMHG

## 2020-06-09 VITALS
SYSTOLIC BLOOD PRESSURE: 139 MMHG | DIASTOLIC BLOOD PRESSURE: 76 MMHG | RESPIRATION RATE: 18 BRPM | OXYGEN SATURATION: 100 % | TEMPERATURE: 98.8 F | HEART RATE: 69 BPM

## 2020-06-09 DIAGNOSIS — E10.621 TYPE 1 DIABETES MELLITUS WITH FOOT ULCER: ICD-10-CM

## 2020-06-09 DIAGNOSIS — L97.412 NON-PRESSURE CHRONIC ULCER OF RIGHT HEEL AND MIDFOOT WITH FAT LAYER EXPOSED: ICD-10-CM

## 2020-06-09 DIAGNOSIS — L97.422 NON-PRESSURE CHRONIC ULCER OF LEFT HEEL AND MIDFOOT WITH FAT LAYER EXPOSED: ICD-10-CM

## 2020-06-09 DIAGNOSIS — Z79.4 LONG TERM (CURRENT) USE OF INSULIN: ICD-10-CM

## 2020-06-09 DIAGNOSIS — L97.509 TYPE 1 DIABETES MELLITUS WITH FOOT ULCER: ICD-10-CM

## 2020-06-09 DIAGNOSIS — I77.0 ARTERIOVENOUS FISTULA, ACQUIRED: Chronic | ICD-10-CM

## 2020-06-09 DIAGNOSIS — E11.621 TYPE 2 DIABETES MELLITUS WITH FOOT ULCER: ICD-10-CM

## 2020-06-09 PROCEDURE — 99183 HYPERBARIC OXYGEN THERAPY: CPT

## 2020-06-09 PROCEDURE — G0277: CPT

## 2020-06-09 PROCEDURE — 82962 GLUCOSE BLOOD TEST: CPT

## 2020-06-09 NOTE — ADDENDUM
[FreeTextEntry1] : Due to Drainage  pt Recieved wc by LPn prior tx. \par Pt descended to 2.0 RIVAS @ 1.75 PSI/min without incident in chamber # 1. \par Pt resting @ depth with chest rise and fall observed by chamber side. \par Pt ascended from 2.0 RIVAS @ 1.75 PSI/min without incident. \par Pt tolerated tx well.\par

## 2020-06-09 NOTE — PROCEDURE
[Outpatient] : Outpatient [Ambulatory] : Patient is ambulatory. [THIS CHAMBER HAS BEEN CLEANED / DISINFECTED] : This chamber has been cleaned / disinfected according to local and hospital policy and procedure prior to this treatment. [Patient demonstrated and verbalized proper technique for using air break mask] : Patient demonstrated and verbalized proper technique for using air break mask [Patient educated on the risks of SMOKING prior to HBOT with understanding] : Patient educated on the risks of SMOKING prior to HBOT with understanding [Patient educated on the risks of CONSUMING ALCOHOL prior to HBOT with understanding] : Patient educated on the risks of CONSUMING ALCOHOL prior to HBOT with understanding [100% Cotton] : 100% cotton [Empty all pockets] : empty all pockets [No hair oils, wigs, hairpieces, pins] : no hair oils, wigs, hairpieces, pins  [Pre tx medications] : pre tx medications  [No make-up, creams] : no make-up, creams  [No jewelry] : no jewelry  [No matches, cigarettes, lighters] : no matches, cigarettes, lighters  [Hearing aid removed] : hearing aid removed [Dentures removed] : dentures removed [Ground bracelet on pt's wrist] : ground bracelet on pt's wrist  [Contacts removed] : contacts removed  [Remove nail polish] : remove nail polish  [No reading material] : no reading material  [Bra, undergarments removed] : bra, undergarments removed  [No contraindicated dressings] : no contraindicated dressings [Ground Wire - VISUAL Verification - Intact/Free of Obstruction] : Ground Wire - VISUAL Verification - Intact/Free of Obstruction  [Ground Continuity - Verified < 1 ohm w/ Wrist Strap Barb] : Ground Continuity - Verified < 1 ohm w/ Wrist Strap Barb [Diagnosis: ___] : Diagnosis: [unfilled] [Number: ___] : Number: [unfilled] [____] : Post-Dive: Time - [unfilled] [___] : Post-Dive: Value - [unfilled] mg/dL [Clear all fields] : clear all fields [] : No [FreeTextEntry3] : 90 mins [FreeTextEntry1] : 2.0 [FreeTextEntry7] : 5548 [FreeTextEntry5] : 7547 [FreeTextEntry9] : 5334 [de-identified] : 100 [de-identified] : 108 mins

## 2020-06-10 ENCOUNTER — APPOINTMENT (OUTPATIENT)
Dept: HYPERBARIC MEDICINE | Facility: HOSPITAL | Age: 45
End: 2020-06-10

## 2020-06-11 ENCOUNTER — OUTPATIENT (OUTPATIENT)
Dept: OUTPATIENT SERVICES | Facility: HOSPITAL | Age: 45
LOS: 1 days | Discharge: HOME | End: 2020-06-11
Payer: COMMERCIAL

## 2020-06-11 ENCOUNTER — APPOINTMENT (OUTPATIENT)
Dept: HYPERBARIC MEDICINE | Facility: HOSPITAL | Age: 45
End: 2020-06-11
Payer: COMMERCIAL

## 2020-06-11 VITALS
SYSTOLIC BLOOD PRESSURE: 161 MMHG | OXYGEN SATURATION: 100 % | DIASTOLIC BLOOD PRESSURE: 90 MMHG | RESPIRATION RATE: 18 BRPM | HEART RATE: 67 BPM | TEMPERATURE: 97.9 F

## 2020-06-11 VITALS
OXYGEN SATURATION: 100 % | SYSTOLIC BLOOD PRESSURE: 106 MMHG | RESPIRATION RATE: 18 BRPM | HEART RATE: 68 BPM | DIASTOLIC BLOOD PRESSURE: 61 MMHG | TEMPERATURE: 98.1 F

## 2020-06-11 DIAGNOSIS — L97.529 TYPE 2 DIABETES MELLITUS WITH FOOT ULCER: ICD-10-CM

## 2020-06-11 DIAGNOSIS — E10.621 TYPE 1 DIABETES MELLITUS WITH FOOT ULCER: ICD-10-CM

## 2020-06-11 DIAGNOSIS — L97.422 NON-PRESSURE CHRONIC ULCER OF LEFT HEEL AND MIDFOOT WITH FAT LAYER EXPOSED: ICD-10-CM

## 2020-06-11 DIAGNOSIS — L97.412 NON-PRESSURE CHRONIC ULCER OF RIGHT HEEL AND MIDFOOT WITH FAT LAYER EXPOSED: ICD-10-CM

## 2020-06-11 DIAGNOSIS — I77.0 ARTERIOVENOUS FISTULA, ACQUIRED: Chronic | ICD-10-CM

## 2020-06-11 DIAGNOSIS — E11.621 TYPE 2 DIABETES MELLITUS WITH FOOT ULCER: ICD-10-CM

## 2020-06-11 DIAGNOSIS — Z79.4 LONG TERM (CURRENT) USE OF INSULIN: ICD-10-CM

## 2020-06-11 PROCEDURE — 82962 GLUCOSE BLOOD TEST: CPT

## 2020-06-11 PROCEDURE — G0277: CPT

## 2020-06-11 PROCEDURE — 99183 HYPERBARIC OXYGEN THERAPY: CPT

## 2020-06-11 NOTE — PROCEDURE
[Outpatient] : Outpatient [Wheelchair] : wheelchair [THIS CHAMBER HAS BEEN CLEANED / DISINFECTED] : This chamber has been cleaned / disinfected according to local and hospital policy and procedure prior to this treatment. [Patient demonstrated and verbalized proper technique for using air break mask] : Patient demonstrated and verbalized proper technique for using air break mask [Patient educated on the risks of SMOKING prior to HBOT with understanding] : Patient educated on the risks of SMOKING prior to HBOT with understanding [Patient educated on the risks of CONSUMING ALCOHOL prior to HBOT with understanding] : Patient educated on the risks of CONSUMING ALCOHOL prior to HBOT with understanding [100% Cotton] : 100% cotton [Empty all pockets] : empty all pockets [No hair oils, wigs, hairpieces, pins] : no hair oils, wigs, hairpieces, pins  [Pre tx medications] : pre tx medications  [No jewelry] : no jewelry  [No make-up, creams] : no make-up, creams  [No matches, cigarettes, lighters] : no matches, cigarettes, lighters  [Dentures removed] : dentures removed [Hearing aid removed] : hearing aid removed [Ground bracelet on pt's wrist] : ground bracelet on pt's wrist  [Contacts removed] : contacts removed  [Remove nail polish] : remove nail polish  [No reading material] : no reading material  [Bra, undergarments removed] : bra, undergarments removed  [Ground Wire - VISUAL Verification - Intact/Free of Obstruction] : Ground Wire - VISUAL Verification - Intact/Free of Obstruction  [No contraindicated dressings] : no contraindicated dressings [Ground Continuity - Verified < 1 ohm w/ Wrist Strap Barb] : Ground Continuity - Verified < 1 ohm w/ Wrist Strap Barb [Number: ___] : Number: [unfilled] [Diagnosis: ___] : Diagnosis: [unfilled] [____] : Post-Dive: Time - [unfilled] [___] : Post-Dive: Value - [unfilled] mg/dL [Clear all fields] : clear all fields [] : No [FreeTextEntry3] : 90 [FreeTextEntry5] : 0814 [FreeTextEntry7] : 0392 [FreeTextEntry9] : 2263 [de-identified] : 0990 [de-identified] : 108 min

## 2020-06-11 NOTE — ADDENDUM
[FreeTextEntry1] : Drainage cleared to be changed post HBO tx. Pt's heels offloaded using wedge.\par Pt descended to 2.0 RIVAS @ 1.75 PSI/min without incident in chamber #1.\par Pt resting @ depth with chest rise and fall observed throughout tx.\par Pt ascended from 2.0 RIVAS @ 1.75 PSI/min without incident. \par Pt tolerated tx well. Pt received wound care post HBO tx. \par

## 2020-06-11 NOTE — ASSESSMENT
[No change from previous assessment] : No change from previous assessment [Patient prepared for dive] : Patient prepared for dive [Time MD/Provider assessed Patient:_______] : Time MD/Provider assessed Patient: [unfilled] [Patient undergoing HBO treatment for __________] : Patient undergoing HBO treatment for [unfilled] [Patient descended without problem for 9 minutes] : Patient descended without problem for 9 minutes [No ear problems] : No ear problems [No dizziness or thirst] :  No dizziness or thirst [Vital signs stable] : Vital signs stable [Tolerating dive well] : Tolerating dive well [Respiratory Rate Stable] : Respiratory Rate Stable [No Chest Pain, shortness of breath] : No Chest Pain, shortness of breath [Tolerated Ascent well] : Tolerated Ascent well [No chest pain, shortness of breath, or ear pain] :  No chest pain, shortness of breath, or ear pain  [Vital Signs stable] : Vital Signs stable [A physician was present throughout the entire HBOT] : A physician was present throughout the entire HBOT [No] : No [Clinically Stable] : Clinically stable [Continue Treatment Plan] : Continue treatment plan

## 2020-06-12 ENCOUNTER — APPOINTMENT (OUTPATIENT)
Dept: HYPERBARIC MEDICINE | Facility: HOSPITAL | Age: 45
End: 2020-06-12

## 2020-06-15 ENCOUNTER — APPOINTMENT (OUTPATIENT)
Dept: HYPERBARIC MEDICINE | Facility: HOSPITAL | Age: 45
End: 2020-06-15

## 2020-06-16 ENCOUNTER — APPOINTMENT (OUTPATIENT)
Dept: HYPERBARIC MEDICINE | Facility: HOSPITAL | Age: 45
End: 2020-06-16

## 2020-06-17 ENCOUNTER — APPOINTMENT (OUTPATIENT)
Dept: HYPERBARIC MEDICINE | Facility: HOSPITAL | Age: 45
End: 2020-06-17

## 2020-06-18 ENCOUNTER — APPOINTMENT (OUTPATIENT)
Dept: HYPERBARIC MEDICINE | Facility: HOSPITAL | Age: 45
End: 2020-06-18

## 2020-06-19 ENCOUNTER — APPOINTMENT (OUTPATIENT)
Dept: HYPERBARIC MEDICINE | Facility: HOSPITAL | Age: 45
End: 2020-06-19

## 2020-06-22 ENCOUNTER — APPOINTMENT (OUTPATIENT)
Dept: HYPERBARIC MEDICINE | Facility: HOSPITAL | Age: 45
End: 2020-06-22

## 2020-06-23 ENCOUNTER — APPOINTMENT (OUTPATIENT)
Dept: HYPERBARIC MEDICINE | Facility: HOSPITAL | Age: 45
End: 2020-06-23

## 2020-06-24 ENCOUNTER — APPOINTMENT (OUTPATIENT)
Dept: HYPERBARIC MEDICINE | Facility: HOSPITAL | Age: 45
End: 2020-06-24

## 2020-06-25 ENCOUNTER — APPOINTMENT (OUTPATIENT)
Dept: HYPERBARIC MEDICINE | Facility: HOSPITAL | Age: 45
End: 2020-06-25

## 2020-06-26 ENCOUNTER — APPOINTMENT (OUTPATIENT)
Dept: HYPERBARIC MEDICINE | Facility: HOSPITAL | Age: 45
End: 2020-06-26

## 2021-09-27 NOTE — REVIEW OF SYSTEMS
Cr to 1.78 on admission, likely in setting of sepsis, now improved to 1.1  - continue to monitor [Arthralgias] : arthralgias [Joint Stiffness] : joint stiffness [Skin Wound] : skin wound [Fever] : no fever [Chills] : no chills [Loss Of Hearing] : no hearing loss [Shortness Of Breath] : no shortness of breath [Abdominal Pain] : no abdominal pain [Vomiting] : no vomiting [Anxiety] : no anxiety [Easy Bleeding] : no tendency for easy bleeding [FreeTextEntry5] : diabetic small vessel diseas and cardio vascular disease  [de-identified] : DFU 3 plantar posterior left heel , ssf [de-identified] : IDDM with neuropathy  [de-identified] : IDDM , patient on Dialysis

## 2021-12-20 ENCOUNTER — APPOINTMENT (OUTPATIENT)
Dept: ORTHOPEDIC SURGERY | Facility: CLINIC | Age: 46
End: 2021-12-20

## 2022-07-20 NOTE — ED PROCEDURE NOTE - CPROC ED TOLERANCE1
Pt states she is taking BP meds as prescribed.    First BP: 132/90  First pulse: 78    Second BP: 137/87  Second pulse: 70      Pt brought in at home BP readings. Placed in PCP's folder.   Patient tolerated procedure well.

## 2023-01-09 ENCOUNTER — EMERGENCY (EMERGENCY)
Facility: HOSPITAL | Age: 48
LOS: 1 days | End: 2023-01-09
Admitting: EMERGENCY MEDICINE
Payer: MEDICAID

## 2023-01-09 ENCOUNTER — TRANSCRIPTION ENCOUNTER (OUTPATIENT)
Age: 48
End: 2023-01-09

## 2023-01-09 PROCEDURE — L9981: CPT

## 2023-01-10 ENCOUNTER — INPATIENT (INPATIENT)
Facility: HOSPITAL | Age: 48
LOS: 44 days | Discharge: HOME HEALTH | End: 2023-02-24
Attending: ORTHOPAEDIC SURGERY | Admitting: ORTHOPAEDIC SURGERY
Payer: MEDICAID

## 2023-01-10 ENCOUNTER — RESULT REVIEW (OUTPATIENT)
Age: 48
End: 2023-01-10

## 2023-01-10 VITALS
OXYGEN SATURATION: 99 % | HEART RATE: 75 BPM | RESPIRATION RATE: 16 BRPM | HEIGHT: 67 IN | TEMPERATURE: 98 F | SYSTOLIC BLOOD PRESSURE: 97 MMHG | DIASTOLIC BLOOD PRESSURE: 64 MMHG

## 2023-01-10 DIAGNOSIS — I77.0 ARTERIOVENOUS FISTULA, ACQUIRED: Chronic | ICD-10-CM

## 2023-01-10 DIAGNOSIS — I96 GANGRENE, NOT ELSEWHERE CLASSIFIED: ICD-10-CM

## 2023-01-10 DIAGNOSIS — Z89.511 ACQUIRED ABSENCE OF RIGHT LEG BELOW KNEE: Chronic | ICD-10-CM

## 2023-01-10 LAB
A1C WITH ESTIMATED AVERAGE GLUCOSE RESULT: 7.1 % — HIGH (ref 4–5.6)
ANION GAP SERPL CALC-SCNC: 14 MMOL/L — SIGNIFICANT CHANGE UP (ref 7–14)
APTT BLD: 28 SEC — SIGNIFICANT CHANGE UP (ref 27–36.3)
BASOPHILS # BLD AUTO: 0.08 K/UL — SIGNIFICANT CHANGE UP (ref 0–0.2)
BASOPHILS NFR BLD AUTO: 0.7 % — SIGNIFICANT CHANGE UP (ref 0–2)
BLD GP AB SCN SERPL QL: NEGATIVE — SIGNIFICANT CHANGE UP
BLOOD GAS VENOUS COMPREHENSIVE RESULT: SIGNIFICANT CHANGE UP
BUN SERPL-MCNC: 20 MG/DL — SIGNIFICANT CHANGE UP (ref 7–23)
CALCIUM SERPL-MCNC: 8.9 MG/DL — SIGNIFICANT CHANGE UP (ref 8.4–10.5)
CHLORIDE SERPL-SCNC: 90 MMOL/L — LOW (ref 98–107)
CO2 SERPL-SCNC: 27 MMOL/L — SIGNIFICANT CHANGE UP (ref 22–31)
CREAT SERPL-MCNC: 6.12 MG/DL — HIGH (ref 0.5–1.3)
EGFR: 11 ML/MIN/1.73M2 — LOW
EOSINOPHIL # BLD AUTO: 0.09 K/UL — SIGNIFICANT CHANGE UP (ref 0–0.5)
EOSINOPHIL NFR BLD AUTO: 0.7 % — SIGNIFICANT CHANGE UP (ref 0–6)
ESTIMATED AVERAGE GLUCOSE: 157 — SIGNIFICANT CHANGE UP
GLUCOSE BLDC GLUCOMTR-MCNC: 133 MG/DL — HIGH (ref 70–99)
GLUCOSE BLDC GLUCOMTR-MCNC: 205 MG/DL — HIGH (ref 70–99)
GLUCOSE BLDC GLUCOMTR-MCNC: 227 MG/DL — HIGH (ref 70–99)
GLUCOSE SERPL-MCNC: 205 MG/DL — HIGH (ref 70–99)
HCT VFR BLD CALC: 28.3 % — LOW (ref 39–50)
HGB BLD-MCNC: 8.7 G/DL — LOW (ref 13–17)
IANC: 8.95 K/UL — HIGH (ref 1.8–7.4)
IMM GRANULOCYTES NFR BLD AUTO: 0.8 % — SIGNIFICANT CHANGE UP (ref 0–0.9)
INR BLD: 1.37 RATIO — HIGH (ref 0.88–1.16)
LYMPHOCYTES # BLD AUTO: 1.89 K/UL — SIGNIFICANT CHANGE UP (ref 1–3.3)
LYMPHOCYTES # BLD AUTO: 15.6 % — SIGNIFICANT CHANGE UP (ref 13–44)
MCHC RBC-ENTMCNC: 28.6 PG — SIGNIFICANT CHANGE UP (ref 27–34)
MCHC RBC-ENTMCNC: 30.7 GM/DL — LOW (ref 32–36)
MCV RBC AUTO: 93.1 FL — SIGNIFICANT CHANGE UP (ref 80–100)
MONOCYTES # BLD AUTO: 1.04 K/UL — HIGH (ref 0–0.9)
MONOCYTES NFR BLD AUTO: 8.6 % — SIGNIFICANT CHANGE UP (ref 2–14)
NEUTROPHILS # BLD AUTO: 8.95 K/UL — HIGH (ref 1.8–7.4)
NEUTROPHILS NFR BLD AUTO: 73.6 % — SIGNIFICANT CHANGE UP (ref 43–77)
NRBC # BLD: 0 /100 WBCS — SIGNIFICANT CHANGE UP (ref 0–0)
NRBC # FLD: 0 K/UL — SIGNIFICANT CHANGE UP (ref 0–0)
PLATELET # BLD AUTO: 460 K/UL — HIGH (ref 150–400)
POTASSIUM SERPL-MCNC: SIGNIFICANT CHANGE UP MMOL/L (ref 3.5–5.3)
POTASSIUM SERPL-SCNC: SIGNIFICANT CHANGE UP MMOL/L (ref 3.5–5.3)
PROTHROM AB SERPL-ACNC: 15.9 SEC — HIGH (ref 10.5–13.4)
RBC # BLD: 3.04 M/UL — LOW (ref 4.2–5.8)
RBC # FLD: 14.9 % — HIGH (ref 10.3–14.5)
RH IG SCN BLD-IMP: POSITIVE — SIGNIFICANT CHANGE UP
RH IG SCN BLD-IMP: POSITIVE — SIGNIFICANT CHANGE UP
SODIUM SERPL-SCNC: 131 MMOL/L — LOW (ref 135–145)
WBC # BLD: 12.15 K/UL — HIGH (ref 3.8–10.5)
WBC # FLD AUTO: 12.15 K/UL — HIGH (ref 3.8–10.5)

## 2023-01-10 PROCEDURE — 26952 AMPUTATION OF FINGER/THUMB: CPT | Mod: F7

## 2023-01-10 PROCEDURE — 26030 DRAINAGE OF PALM BURSAS: CPT | Mod: RT

## 2023-01-10 PROCEDURE — 99235 HOSP IP/OBS SAME DATE MOD 70: CPT | Mod: 57

## 2023-01-10 PROCEDURE — 88305 TISSUE EXAM BY PATHOLOGIST: CPT | Mod: 26

## 2023-01-10 PROCEDURE — 26145 TENDON EXCISION PALM/FINGER: CPT | Mod: RT

## 2023-01-10 PROCEDURE — 99285 EMERGENCY DEPT VISIT HI MDM: CPT

## 2023-01-10 PROCEDURE — 88311 DECALCIFY TISSUE: CPT | Mod: 26

## 2023-01-10 PROCEDURE — 26170 REMOVAL OF PALM TENDON EACH: CPT | Mod: RT

## 2023-01-10 RX ORDER — SODIUM CHLORIDE 9 MG/ML
1000 INJECTION INTRAMUSCULAR; INTRAVENOUS; SUBCUTANEOUS
Refills: 0 | Status: DISCONTINUED | OUTPATIENT
Start: 2023-01-10 | End: 2023-01-11

## 2023-01-10 RX ORDER — SODIUM CHLORIDE 9 MG/ML
1000 INJECTION, SOLUTION INTRAVENOUS
Refills: 0 | Status: DISCONTINUED | OUTPATIENT
Start: 2023-01-10 | End: 2023-01-15

## 2023-01-10 RX ORDER — FAMOTIDINE 10 MG/ML
20 INJECTION INTRAVENOUS EVERY 12 HOURS
Refills: 0 | Status: DISCONTINUED | OUTPATIENT
Start: 2023-01-10 | End: 2023-01-15

## 2023-01-10 RX ORDER — DEXTROSE 50 % IN WATER 50 %
15 SYRINGE (ML) INTRAVENOUS ONCE
Refills: 0 | Status: DISCONTINUED | OUTPATIENT
Start: 2023-01-10 | End: 2023-01-15

## 2023-01-10 RX ORDER — OXYCODONE HYDROCHLORIDE 5 MG/1
5 TABLET ORAL EVERY 4 HOURS
Refills: 0 | Status: DISCONTINUED | OUTPATIENT
Start: 2023-01-10 | End: 2023-01-10

## 2023-01-10 RX ORDER — DEXTROSE 50 % IN WATER 50 %
25 SYRINGE (ML) INTRAVENOUS ONCE
Refills: 0 | Status: DISCONTINUED | OUTPATIENT
Start: 2023-01-10 | End: 2023-01-15

## 2023-01-10 RX ORDER — ACETAMINOPHEN 500 MG
975 TABLET ORAL EVERY 8 HOURS
Refills: 0 | Status: DISCONTINUED | OUTPATIENT
Start: 2023-01-10 | End: 2023-02-24

## 2023-01-10 RX ORDER — MAGNESIUM HYDROXIDE 400 MG/1
30 TABLET, CHEWABLE ORAL DAILY
Refills: 0 | Status: DISCONTINUED | OUTPATIENT
Start: 2023-01-10 | End: 2023-01-17

## 2023-01-10 RX ORDER — INSULIN LISPRO 100/ML
4 VIAL (ML) SUBCUTANEOUS
Refills: 0 | Status: DISCONTINUED | OUTPATIENT
Start: 2023-01-10 | End: 2023-01-10

## 2023-01-10 RX ORDER — SODIUM CHLORIDE 9 MG/ML
500 INJECTION INTRAMUSCULAR; INTRAVENOUS; SUBCUTANEOUS ONCE
Refills: 0 | Status: COMPLETED | OUTPATIENT
Start: 2023-01-10 | End: 2023-01-10

## 2023-01-10 RX ORDER — OXYCODONE HYDROCHLORIDE 5 MG/1
5 TABLET ORAL ONCE
Refills: 0 | Status: DISCONTINUED | OUTPATIENT
Start: 2023-01-10 | End: 2023-01-10

## 2023-01-10 RX ORDER — INSULIN GLARGINE 100 [IU]/ML
5 INJECTION, SOLUTION SUBCUTANEOUS AT BEDTIME
Refills: 0 | Status: DISCONTINUED | OUTPATIENT
Start: 2023-01-10 | End: 2023-01-14

## 2023-01-10 RX ORDER — OXYCODONE HYDROCHLORIDE 5 MG/1
10 TABLET ORAL EVERY 4 HOURS
Refills: 0 | Status: DISCONTINUED | OUTPATIENT
Start: 2023-01-10 | End: 2023-01-17

## 2023-01-10 RX ORDER — DEXTROSE 50 % IN WATER 50 %
25 SYRINGE (ML) INTRAVENOUS ONCE
Refills: 0 | Status: DISCONTINUED | OUTPATIENT
Start: 2023-01-10 | End: 2023-01-17

## 2023-01-10 RX ORDER — DEXTROSE 50 % IN WATER 50 %
12.5 SYRINGE (ML) INTRAVENOUS ONCE
Refills: 0 | Status: DISCONTINUED | OUTPATIENT
Start: 2023-01-10 | End: 2023-01-15

## 2023-01-10 RX ORDER — ONDANSETRON 8 MG/1
4 TABLET, FILM COATED ORAL ONCE
Refills: 0 | Status: DISCONTINUED | OUTPATIENT
Start: 2023-01-10 | End: 2023-01-10

## 2023-01-10 RX ORDER — HEPARIN SODIUM 5000 [USP'U]/ML
5000 INJECTION INTRAVENOUS; SUBCUTANEOUS EVERY 8 HOURS
Refills: 0 | Status: DISCONTINUED | OUTPATIENT
Start: 2023-01-10 | End: 2023-01-12

## 2023-01-10 RX ORDER — PIPERACILLIN AND TAZOBACTAM 4; .5 G/20ML; G/20ML
3.38 INJECTION, POWDER, LYOPHILIZED, FOR SOLUTION INTRAVENOUS EVERY 12 HOURS
Refills: 0 | Status: DISCONTINUED | OUTPATIENT
Start: 2023-01-11 | End: 2023-01-27

## 2023-01-10 RX ORDER — GLUCAGON INJECTION, SOLUTION 0.5 MG/.1ML
1 INJECTION, SOLUTION SUBCUTANEOUS ONCE
Refills: 0 | Status: DISCONTINUED | OUTPATIENT
Start: 2023-01-10 | End: 2023-01-15

## 2023-01-10 RX ORDER — HYDROMORPHONE HYDROCHLORIDE 2 MG/ML
0.5 INJECTION INTRAMUSCULAR; INTRAVENOUS; SUBCUTANEOUS
Refills: 0 | Status: DISCONTINUED | OUTPATIENT
Start: 2023-01-10 | End: 2023-01-10

## 2023-01-10 RX ORDER — ERYTHROPOIETIN 10000 [IU]/ML
10000 INJECTION, SOLUTION INTRAVENOUS; SUBCUTANEOUS
Refills: 0 | Status: DISCONTINUED | OUTPATIENT
Start: 2023-01-10 | End: 2023-02-24

## 2023-01-10 RX ORDER — INSULIN LISPRO 100/ML
VIAL (ML) SUBCUTANEOUS
Refills: 0 | Status: DISCONTINUED | OUTPATIENT
Start: 2023-01-10 | End: 2023-01-10

## 2023-01-10 RX ORDER — SODIUM CHLORIDE 9 MG/ML
1000 INJECTION, SOLUTION INTRAVENOUS
Refills: 0 | Status: DISCONTINUED | OUTPATIENT
Start: 2023-01-10 | End: 2023-01-10

## 2023-01-10 RX ORDER — PIPERACILLIN AND TAZOBACTAM 4; .5 G/20ML; G/20ML
3.38 INJECTION, POWDER, LYOPHILIZED, FOR SOLUTION INTRAVENOUS ONCE
Refills: 0 | Status: DISCONTINUED | OUTPATIENT
Start: 2023-01-10 | End: 2023-01-10

## 2023-01-10 RX ORDER — INSULIN LISPRO 100/ML
VIAL (ML) SUBCUTANEOUS AT BEDTIME
Refills: 0 | Status: DISCONTINUED | OUTPATIENT
Start: 2023-01-10 | End: 2023-01-10

## 2023-01-10 RX ORDER — OXYCODONE HYDROCHLORIDE 5 MG/1
10 TABLET ORAL EVERY 4 HOURS
Refills: 0 | Status: DISCONTINUED | OUTPATIENT
Start: 2023-01-10 | End: 2023-01-10

## 2023-01-10 RX ORDER — INSULIN LISPRO 100/ML
VIAL (ML) SUBCUTANEOUS
Refills: 0 | Status: DISCONTINUED | OUTPATIENT
Start: 2023-01-10 | End: 2023-01-16

## 2023-01-10 RX ORDER — SODIUM CHLORIDE 9 MG/ML
1000 INJECTION INTRAMUSCULAR; INTRAVENOUS; SUBCUTANEOUS ONCE
Refills: 0 | Status: DISCONTINUED | OUTPATIENT
Start: 2023-01-10 | End: 2023-01-10

## 2023-01-10 RX ADMIN — HEPARIN SODIUM 5000 UNIT(S): 5000 INJECTION INTRAVENOUS; SUBCUTANEOUS at 21:15

## 2023-01-10 RX ADMIN — SODIUM CHLORIDE 500 MILLILITER(S): 9 INJECTION INTRAMUSCULAR; INTRAVENOUS; SUBCUTANEOUS at 16:47

## 2023-01-10 RX ADMIN — Medication 4: at 20:55

## 2023-01-10 RX ADMIN — INSULIN GLARGINE 5 UNIT(S): 100 INJECTION, SOLUTION SUBCUTANEOUS at 23:15

## 2023-01-10 NOTE — PATIENT PROFILE ADULT - NSPROHMDIABETMGMTSTRAT_GEN_A_NUR
activity/adequate rest/blood glucose testing/diet modification/medication therapy/routine screenings

## 2023-01-10 NOTE — PROGRESS NOTE ADULT - SUBJECTIVE AND OBJECTIVE BOX
Orthopedics Post-Op Check  Patient was seen and examined at bedside. Denies CP/SOB/N/V/HA. Resting comfortably without complaints 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 Pain is controlled.    Vital Signs Last 24 Hrs  T(C): 37.1 (10 Sudeep 2023 21:00), Max: 38 (10 Sudeep 2023 08:34)  T(F): 98.8 (10 Sudeep 2023 21:00), Max: 100.4 (10 Sudeep 2023 08:34)  HR: 71 (10 Sudeep 2023 21:15) (71 - 92)  BP: 95/52 (10 Sudeep 2023 21:15) (85/45 - 113/57)  BP(mean): 65 (10 Sudeep 2023 21:15) (65 - 74)  RR: 19 (10 Sudeep 2023 21:15) (16 - 23)  SpO2: 100% (10 Sudeep 2023 21:15) (97% - 100%)    Parameters below as of 10 Sudeep 2023 19:40  Patient On (Oxygen Delivery Method): nasal cannula  O2 Flow (L/min): 2    Labs:                        8.7    12.15 )-----------( 460      ( 10 Sudeep 2023 20:25 )             28.3     01-10    131<L>  |  90<L>  |  20  ----------------------------<  205<H>  TNP   |  27  |  6.12<H>    Ca    8.9      10 Sudeep 2023 20:25    TPro  8.4<H>  /  Alb  2.7<L>  /  TBili  0.5  /  DBili  x   /  AST  9<L>  /  ALT  13  /  AlkPhos  122<H>  01-10    Physical Exam:  R UE:    Dressing clean/dry/intact. ACE wrap in place.   Sensation intact throughout hand. Able to move fingers but ROM limited 2/2 pain.  Extremity warm

## 2023-01-10 NOTE — ED ADULT TRIAGE NOTE - CHIEF COMPLAINT QUOTE
Pt brought in by EMS from [place] for vascular consult for gangrene to R 2 fingers. Pt received Clindamycin, zosyn and vancomycin at [place]. Pt has a hx of ESRD, dialysis M/W/F last tx was Monday Pt denies chest pain, sob, n/v/d, fever or chills.

## 2023-01-10 NOTE — H&P ADULT - HISTORY OF PRESENT ILLNESS
Patient is a 48 y/o male PMH DM2, Bilateral BKA, ESRD (on HD MWF), last dialyzed yesterday transferred from [facility] emergently for evaluation of evaluation of right finger swelling/pain. Patient reports history of right finger pain after he had a fall one year ago. Patient had a wound on her second/middle finger who he was seeing a hand surgeon for outpatient but unable ultimately to continue seeing due to insurance issues. Patient reports his middle finger developed increased swelling and became black in color about two weeks ago. Denies fevers.   Patient transferred to [facility] for further evaluation and emergent OR. Patient received broad spectrum Abx of Zosyn/vanco/clindamycin at [place].   Interpretor phone used for translation with patient. ID# [id number]

## 2023-01-10 NOTE — PATIENT PROFILE ADULT - FALL HARM RISK - HARM RISK INTERVENTIONS
Assistance with ambulation/Assistance OOB with selected safe patient handling equipment/Communicate Risk of Fall with Harm to all staff/Discuss with provider need for PT consult/Monitor gait and stability/Provide patient with walking aids - walker, cane, crutches/Reinforce activity limits and safety measures with patient and family/Sit up slowly, dangle for a short time, stand at bedside before walking/Tailored Fall Risk Interventions/Use of alarms - bed, chair and/or voice tab/Visual Cue: Yellow wristband and red socks/Bed in lowest position, wheels locked, appropriate side rails in place/Call bell, personal items and telephone in reach/Instruct patient to call for assistance before getting out of bed or chair/Non-slip footwear when patient is out of bed/[place] to call system/Physically safe environment - no spills, clutter or unnecessary equipment/Purposeful Proactive Rounding/Room/bathroom lighting operational, light cord in reach

## 2023-01-10 NOTE — ED PROVIDER NOTE - CLINICAL SUMMARY MEDICAL DECISION MAKING FREE TEXT BOX
46 yo M with PMH of ESRD on HD (MWF, last HD 1/9/23), IDDM, b/l BKA, transferred from [facility] for gas gangrene of right hand 46 yo M with PMH of ESRD on HD (MWF, last HD 1/9/23), IDDM, b/l BKA, transferred from [facility] for gas gangrene of right hand. Pt was seen and evaluated at [facility], had ID and plastic surgery consult who recommended transfer to higher level of care, was given broad-spectrum antibiotics of Zosyn vancomycin and clindamycin. Concern for right 3rd finger gangrene, possible necrotizing fasciitis. Plan: pre op labs, ortho evaluation and admission.

## 2023-01-10 NOTE — PROGRESS NOTE ADULT - ASSESSMENT
A/P: 47y y/o Male s/p Right 3rd finger amputation at metacarpal head, hand I&D and CTR, POD #0      Malik ID # [id number] used.     -Pain control/analgesia  -DVT ppx - Venodynes/HSQ  -FU AM Labs  -Non-weight bearing right upper extremity in bulky dressing  -Daily dressing and packing changes  -FU nephrology and HD for 1/11 AM  -Notify orthopedics with any questions

## 2023-01-10 NOTE — PATIENT PROFILE ADULT - FUNCTIONAL ASSESSMENT - BASIC MOBILITY 6.
3-calculated by average/Not able to assess (calculate score using [facility] averaging method)

## 2023-01-10 NOTE — ED ADULT NURSE NOTE - OBJECTIVE STATEMENT
pt. received direct to [facility] A&Ox4 transferred from [facility] for higher level of care p/w gangrene on the R hand. pt. endorses pain consistent x"A couple of weeks" a/w blackening of the 3rd digit of the right hand. NAD noted at this time. respirations even and unlabored on RA. 20g IV noted to the L Hand from [place]. AV Fistula noted to the R Arm, dressed. pt. moved to 15A at this time. comfort measures provided. safety precautions maintained.

## 2023-01-10 NOTE — H&P ADULT - ASSESSMENT
Patient is a 48 yo male with 3rd digit gangrene/necrosis    1. Admit to orthopedic service under Dr. Sin  2. NPO for OR  3. Plan for OR emergently for I&D/Amputation, possible wound vac placement Right third digit  4. Obtain stat T&S x2  5. EKG  6. Consented by Ortho resident  7. Blood cultures x2  8. Above reviewed with Ortho hand attending Dr. Sin who is in agreement with OR.  9. Please notify ortho with any further questions.

## 2023-01-10 NOTE — ED ADULT NURSE NOTE - CHIEF COMPLAINT QUOTE
Pt brought in by EMS from [place] for vascular consult for gangrene to R 2 fingers. Pt received Clindamycin, zosyn and vancomycin at [place]. Pt has a hx of ESRD, dialysis M/W/F last tx was Monday Pt denies chest pain, sob, n/v/d, fever or chills.

## 2023-01-10 NOTE — ED ADULT NURSE REASSESSMENT NOTE - NS ED NURSE REASSESS COMMENT FT1
Pt report given to [facility] OR RN - OR residents at bedside as per residents to be taken quickly to OR will have pt change into gown on unit - - jhon queen rn

## 2023-01-10 NOTE — ED PROVIDER NOTE - OBJECTIVE STATEMENT
48 yo M with PMH of ESRD on HD (MWF, last HD 1/9/23), IDDM, b/l BKA, transferred from [facility] for gas gangrene of right hand. Reports the finger turned black for a week w/ pain, no injury. Admits wound to right 2nd finger from work. Denies any fever, chills, weakness, numbness, or any other complaints.   Admits left handed.

## 2023-01-10 NOTE — ED PROVIDER NOTE - NS ED ATTENDING STATEMENT MOD
This was a shared visit with the TONO. I reviewed and verified the documentation and independently performed the documented:

## 2023-01-10 NOTE — ED PROVIDER NOTE - MUSCULOSKELETAL NECK EXAM
right hand: swelling to dorsal aspect of hand extending to right hand: swelling to dorsal aspect of hand, eschar of right middle finger including distal, middle and proximal phalanx. +tenderness to palpation, no bleeding. small wound to right 2nd distal phalanx, no bleeding. +warm.

## 2023-01-10 NOTE — ED PROVIDER NOTE - ATTENDING APP SHARED VISIT CONTRIBUTION OF CARE
Attending note:   After face to face evaluation of this patient, I concur with above noted hx, pe, and care plan for this patient.  Prado: 47-year-old male with past medical history of end-stage renal disease on hemodialysis last done yesterday.  Patient also with diabetes and bilateral BKA's.  Patient transferred to our hospital from [place] for gangrene of the right hand.  Patient notes 1 week of fingers turning black but no known injury.  Patient accepted by hand surgery for possible or today.  On exam patient has right third finger with dry gangrene of the entire digit with some erythema of the second and fourth digits as well.  Patient also with some mild erythema and edema over the palm and over the wrist.  Patient notes pain with movement of the hand at all.  Patient's blood pressure is 93/50 currently but unsure of patient's baseline.  Patient's lungs are clear.  Heart is regular rate rhythm.  Patient is in no acute distress.  We will repeat all blood work and review labs performed at [place].  We will also review imaging.  Patient to be admitted to orthopedics for hand evaluation and OR today.  Will give small dose of IV bolus 500 cc as patient is n.p.o. and blood pressure is soft.

## 2023-01-10 NOTE — H&P ADULT - NSHPPHYSICALEXAM_GEN_ALL_CORE
Right hand:  S/p amputation R distal phalanges second digit, 3rd digit necrotic, with swelling, sensation intact throughout hand, +1 radial pulse, able to range fingers but with red ROM, no pain with passive stretch of fingers

## 2023-01-11 DIAGNOSIS — E11.9 TYPE 2 DIABETES MELLITUS WITHOUT COMPLICATIONS: ICD-10-CM

## 2023-01-11 DIAGNOSIS — H54.7 UNSPECIFIED VISUAL LOSS: ICD-10-CM

## 2023-01-11 DIAGNOSIS — N18.6 END STAGE RENAL DISEASE: ICD-10-CM

## 2023-01-11 DIAGNOSIS — I73.9 PERIPHERAL VASCULAR DISEASE, UNSPECIFIED: ICD-10-CM

## 2023-01-11 LAB
ANION GAP SERPL CALC-SCNC: 16 MMOL/L — HIGH (ref 7–14)
BUN SERPL-MCNC: 23 MG/DL — SIGNIFICANT CHANGE UP (ref 7–23)
CALCIUM SERPL-MCNC: 8.9 MG/DL — SIGNIFICANT CHANGE UP (ref 8.4–10.5)
CHLORIDE SERPL-SCNC: 90 MMOL/L — LOW (ref 98–107)
CO2 SERPL-SCNC: 27 MMOL/L — SIGNIFICANT CHANGE UP (ref 22–31)
CREAT SERPL-MCNC: 7 MG/DL — HIGH (ref 0.5–1.3)
DIALYSIS INSTRUMENT RESULT - HEPATITIS B SURFACE ANTIGEN: NEGATIVE — SIGNIFICANT CHANGE UP
EGFR: 9 ML/MIN/1.73M2 — LOW
GLUCOSE BLDC GLUCOMTR-MCNC: 139 MG/DL — HIGH (ref 70–99)
GLUCOSE BLDC GLUCOMTR-MCNC: 160 MG/DL — HIGH (ref 70–99)
GLUCOSE BLDC GLUCOMTR-MCNC: 197 MG/DL — HIGH (ref 70–99)
GLUCOSE BLDC GLUCOMTR-MCNC: 200 MG/DL — HIGH (ref 70–99)
GLUCOSE BLDC GLUCOMTR-MCNC: 211 MG/DL — HIGH (ref 70–99)
GLUCOSE SERPL-MCNC: 205 MG/DL — HIGH (ref 70–99)
HBV CORE AB SER-ACNC: SIGNIFICANT CHANGE UP
HBV SURFACE AB SER-ACNC: 7.6 MIU/ML — LOW
HBV SURFACE AG SER-ACNC: SIGNIFICANT CHANGE UP
HCT VFR BLD CALC: 27 % — LOW (ref 39–50)
HCV AB S/CO SERPL IA: 0.15 S/CO — SIGNIFICANT CHANGE UP (ref 0–0.99)
HCV AB SERPL-IMP: SIGNIFICANT CHANGE UP
HGB BLD-MCNC: 8.4 G/DL — LOW (ref 13–17)
MCHC RBC-ENTMCNC: 29 PG — SIGNIFICANT CHANGE UP (ref 27–34)
MCHC RBC-ENTMCNC: 31.1 GM/DL — LOW (ref 32–36)
MCV RBC AUTO: 93.1 FL — SIGNIFICANT CHANGE UP (ref 80–100)
MRSA PCR RESULT.: DETECTED
NIGHT BLUE STAIN TISS: SIGNIFICANT CHANGE UP
NRBC # BLD: 0 /100 WBCS — SIGNIFICANT CHANGE UP (ref 0–0)
NRBC # FLD: 0 K/UL — SIGNIFICANT CHANGE UP (ref 0–0)
PLATELET # BLD AUTO: 436 K/UL — HIGH (ref 150–400)
POTASSIUM SERPL-MCNC: 3.4 MMOL/L — LOW (ref 3.5–5.3)
POTASSIUM SERPL-SCNC: 3.4 MMOL/L — LOW (ref 3.5–5.3)
RBC # BLD: 2.9 M/UL — LOW (ref 4.2–5.8)
RBC # FLD: 14.6 % — HIGH (ref 10.3–14.5)
S AUREUS DNA NOSE QL NAA+PROBE: DETECTED
SODIUM SERPL-SCNC: 133 MMOL/L — LOW (ref 135–145)
SPECIMEN SOURCE: SIGNIFICANT CHANGE UP
VANCOMYCIN FLD-MCNC: 6.4 UG/ML — SIGNIFICANT CHANGE UP
WBC # BLD: 15.99 K/UL — HIGH (ref 3.8–10.5)
WBC # FLD AUTO: 15.99 K/UL — HIGH (ref 3.8–10.5)

## 2023-01-11 PROCEDURE — 99222 1ST HOSP IP/OBS MODERATE 55: CPT

## 2023-01-11 PROCEDURE — 99223 1ST HOSP IP/OBS HIGH 75: CPT

## 2023-01-11 RX ORDER — MIDODRINE HYDROCHLORIDE 2.5 MG/1
10 TABLET ORAL THREE TIMES A DAY
Refills: 0 | Status: DISCONTINUED | OUTPATIENT
Start: 2023-01-11 | End: 2023-02-18

## 2023-01-11 RX ORDER — HYDROMORPHONE HYDROCHLORIDE 2 MG/ML
0.5 INJECTION INTRAMUSCULAR; INTRAVENOUS; SUBCUTANEOUS ONCE
Refills: 0 | Status: DISCONTINUED | OUTPATIENT
Start: 2023-01-11 | End: 2023-01-11

## 2023-01-11 RX ORDER — VANCOMYCIN HCL 1 G
500 VIAL (EA) INTRAVENOUS ONCE
Refills: 0 | Status: COMPLETED | OUTPATIENT
Start: 2023-01-11 | End: 2023-01-11

## 2023-01-11 RX ORDER — CHLORHEXIDINE GLUCONATE 213 G/1000ML
1 SOLUTION TOPICAL
Refills: 0 | Status: DISCONTINUED | OUTPATIENT
Start: 2023-01-11 | End: 2023-01-18

## 2023-01-11 RX ORDER — CHLORHEXIDINE GLUCONATE 213 G/1000ML
1 SOLUTION TOPICAL DAILY
Refills: 0 | Status: DISCONTINUED | OUTPATIENT
Start: 2023-01-11 | End: 2023-01-15

## 2023-01-11 RX ADMIN — Medication 2: at 11:31

## 2023-01-11 RX ADMIN — Medication 975 MILLIGRAM(S): at 00:28

## 2023-01-11 RX ADMIN — FAMOTIDINE 20 MILLIGRAM(S): 10 INJECTION INTRAVENOUS at 17:38

## 2023-01-11 RX ADMIN — MIDODRINE HYDROCHLORIDE 10 MILLIGRAM(S): 2.5 TABLET ORAL at 10:25

## 2023-01-11 RX ADMIN — HEPARIN SODIUM 5000 UNIT(S): 5000 INJECTION INTRAVENOUS; SUBCUTANEOUS at 05:45

## 2023-01-11 RX ADMIN — MIDODRINE HYDROCHLORIDE 10 MILLIGRAM(S): 2.5 TABLET ORAL at 16:19

## 2023-01-11 RX ADMIN — INSULIN GLARGINE 5 UNIT(S): 100 INJECTION, SOLUTION SUBCUTANEOUS at 21:42

## 2023-01-11 RX ADMIN — Medication 975 MILLIGRAM(S): at 14:27

## 2023-01-11 RX ADMIN — Medication 975 MILLIGRAM(S): at 21:37

## 2023-01-11 RX ADMIN — Medication 100 MILLIGRAM(S): at 23:27

## 2023-01-11 RX ADMIN — Medication 975 MILLIGRAM(S): at 05:43

## 2023-01-11 RX ADMIN — Medication 1 TABLET(S): at 11:32

## 2023-01-11 RX ADMIN — OXYCODONE HYDROCHLORIDE 10 MILLIGRAM(S): 5 TABLET ORAL at 03:25

## 2023-01-11 RX ADMIN — HEPARIN SODIUM 5000 UNIT(S): 5000 INJECTION INTRAVENOUS; SUBCUTANEOUS at 16:19

## 2023-01-11 RX ADMIN — FAMOTIDINE 20 MILLIGRAM(S): 10 INJECTION INTRAVENOUS at 05:44

## 2023-01-11 RX ADMIN — ERYTHROPOIETIN 10000 UNIT(S): 10000 INJECTION, SOLUTION INTRAVENOUS; SUBCUTANEOUS at 12:24

## 2023-01-11 RX ADMIN — OXYCODONE HYDROCHLORIDE 10 MILLIGRAM(S): 5 TABLET ORAL at 04:23

## 2023-01-11 RX ADMIN — PIPERACILLIN AND TAZOBACTAM 25 GRAM(S): 4; .5 INJECTION, POWDER, LYOPHILIZED, FOR SOLUTION INTRAVENOUS at 16:15

## 2023-01-11 RX ADMIN — Medication 2: at 17:38

## 2023-01-11 RX ADMIN — PIPERACILLIN AND TAZOBACTAM 25 GRAM(S): 4; .5 INJECTION, POWDER, LYOPHILIZED, FOR SOLUTION INTRAVENOUS at 02:38

## 2023-01-11 RX ADMIN — Medication 4: at 07:04

## 2023-01-11 NOTE — PHYSICAL THERAPY INITIAL EVALUATION ADULT - ADDITIONAL COMMENTS
Pt lives in a house, no stairs to negotiate. Prior to admission, pt ambulated mostly independently within the home with use of bilateral prosthetics. In the community, pt utilized a walker.

## 2023-01-11 NOTE — PHYSICAL THERAPY INITIAL EVALUATION ADULT - PATIENT PROFILE REVIEW, REHAB EVAL
PT evaluate and treat orders received: Out of bed to chair. Consult with DAVID Ware, pt may participate in PT evaluation./yes
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
## 2023-01-11 NOTE — CONSULT NOTE ADULT - PROBLEM SELECTOR RECOMMENDATION 3
Pt has been tolerating HD and there is apparent patency of the RUE fistula. Appreciate Nephrology consult. Continue HD schedule per Nephrology (recently MWF). C/w midodrine as well as epo injections.

## 2023-01-11 NOTE — PHYSICAL THERAPY INITIAL EVALUATION ADULT - RANGE OF MOTION EXAMINATION, REHAB EVAL
See OT evaluation for UE assessment./bilateral lower extremity ROM was WFL (within functional limits)
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
## 2023-01-11 NOTE — CONSULT NOTE ADULT - PROBLEM SELECTOR RECOMMENDATION 4
Pt reporting inability to see out of left eye, and vision grossly impaired on exam. Pt is at risk for diabetic retinopathy as well as other vascular pathology (such as retinal artery/vein occlusion) in setting of polyvascular disease. Recommend Ophthalmology consult.

## 2023-01-11 NOTE — PROGRESS NOTE ADULT - ASSESSMENT
Patient is a 46 y/o male PMH DM2, Bilateral BKA, ESRD (on HD MWF), last dialyzed yesterday transferred from [facility] emergently for evaluation of evaluation of right finger swelling/pain. Patient reports history of right finger pain after he had a fall one year ago. Patient had a wound on her second/middle finger who he was seeing a hand surgeon for outpatient but unable ultimately to continue seeing due to insurance issues. Patient reports his middle finger developed increased swelling and became black in color about two weeks ago. Denies fevers.   Patient transferred to [facility] for further evaluation and emergent OR. Patient received broad spectrum Abx of Zosyn/vanco/clindamycin at [place].   Interpretor phone used for translation with patient. ID# [id number]              Admit for septic workup and ID evaluation,send blood and urine cx,serial lactate levels,monitor vitals closley,ivfs hydration,monitor urine output and renal profile,iv abx as per id cons    CHRONIC KIDNEY DISEASE, STAGE 5:   Serum creatinine is stable at 5.2 , approximating a GFR of *** ml/min.   There is no progression.  No uremic symptoms. No evidence of  worsening  Anemia. Fluid status stable.   Will continue to avoid nephrotoxic drugs.  Patient remains asymptomatic.  Continue current therapy.    BP monitoring,continue current antihypertensive meds, low salt diet,followup with PMD in 1-2 weeks

## 2023-01-11 NOTE — CONSULT NOTE ADULT - ASSESSMENT
Assessment and Recommendations:  46 y/o male PMH DM2, Bilateral BKA, ESRD (on HD MWF), last dialyzed yesterday transferred from [facility] emergently for gangrenous finger consulted for vision loss left eye, found to have no light perception vision in left eye for 4 months per patient. Patient reports that he visited his ophthalmologist 3 months ago and was told that his left eye was gone for good, but he is unsure why. Patient with vision 20/60 right eye, NLP left eye,  intraocular pressure elevated left eye, extraocular movements full.  Dilated fundus exam shows some tractional fibrosis vs regressed nerve OD and pale nerve OS. Vision loss OS possible 2/2 to old CRAO vs glaucoma     Chronic vision loss OS   - Unclear etiology at this time, however patient reports vision at baseline for past 4 months.    - Will obtain records from Dr. Gil Pettit ([phone]) tomorrow.   - Patient in no distress at this time, no discomfort for elevated pressure left eye.     Discussed with Dr. Arnold    Outpatient Follow-up: Patient should follow-up with his/her ophthalmologist or with [facility] Department of Ophthalmology within 1 week of after discharge at:    [address]  [place] [address]  [phone]    Wilmer Lopez MD, PGY-2  Also available on [employer] Teams

## 2023-01-11 NOTE — PROGRESS NOTE ADULT - NUTRITIONAL ASSESSMENT
MEDICATIONS  (STANDING):  acetaminophen     Tablet .. 975 milliGRAM(s) Oral every 8 hours  chlorhexidine 2% Cloths 1 Application(s) Topical <User Schedule>  chlorhexidine 2% Cloths 1 Application(s) Topical daily  dextrose 5%. 1000 milliLiter(s) (50 mL/Hr) IV Continuous <Continuous>  dextrose 5%. 1000 milliLiter(s) (100 mL/Hr) IV Continuous <Continuous>  dextrose 5%. 1000 milliLiter(s) (100 mL/Hr) IV Continuous <Continuous>  dextrose 5%. 1000 milliLiter(s) (50 mL/Hr) IV Continuous <Continuous>  dextrose 50% Injectable 25 Gram(s) IV Push once  dextrose 50% Injectable 12.5 Gram(s) IV Push once  dextrose 50% Injectable 25 Gram(s) IV Push once  dextrose 50% Injectable 25 Gram(s) IV Push once  dextrose 50% Injectable 12.5 Gram(s) IV Push once  dextrose 50% Injectable 25 Gram(s) IV Push once  epoetin deidre-epbx (RETACRIT) Injectable 49100 Unit(s) IV Push <User Schedule>  famotidine    Tablet 20 milliGRAM(s) Oral every 12 hours  glucagon  Injectable 1 milliGRAM(s) IntraMuscular once  glucagon  Injectable 1 milliGRAM(s) IntraMuscular once  heparin   Injectable 5000 Unit(s) SubCutaneous every 8 hours  insulin glargine Injectable (LANTUS) 5 Unit(s) SubCutaneous at bedtime  insulin lispro (ADMELOG) corrective regimen sliding scale   SubCutaneous three times a day before meals  midodrine. 10 milliGRAM(s) Oral three times a day  multivitamin 1 Tablet(s) Oral daily  piperacillin/tazobactam IVPB.. 3.375 Gram(s) IV Intermittent every 12 hours  sodium chloride 0.9%. 1000 milliLiter(s) (100 mL/Hr) IV Continuous <Continuous>  vancomycin  IVPB 500 milliGRAM(s) IV Intermittent once

## 2023-01-11 NOTE — CONSULT NOTE ADULT - ATTENDING COMMENTS
Finger amputated. Probably doesn't need a long course of antibiotics but will keep on broad coverage for possible residual skin soft tissue infection pending OR formalization and culture data.     Tr Alvarez MD   Infectious Disease   Available on TEAMS. After 5PM and on weekends please page fellow on call or call [phone]

## 2023-01-11 NOTE — PROGRESS NOTE ADULT - ASSESSMENT
A/P: 47y y/o Male s/p Right 3rd finger amputation at metacarpal head, hand I&D and CTR, POD #1    -Pain control/analgesia  -DVT ppx - Venodynes/HSQ  -FU AM Labs  -Non-weight bearing right upper extremity in bulky dressing  -Daily dressing and packing changes  -FU nephrology and HD for 1/11 AM  -Notify orthopedics with any questions

## 2023-01-11 NOTE — CONSULT NOTE ADULT - PROBLEM SELECTOR RECOMMENDATION 9
Suspect related to DM2 as well as ESRD with RUE fistula. Recommend Vascular Surgery consult. Consider additional RUE imaging or workup given suboptimal CT angio. Continue pt on prophylactic heparin at this time and optimize DM2 as below. Agree with zosyn for continued coverage of hand soft tissue infection. F/u OR cultures.

## 2023-01-11 NOTE — OCCUPATIONAL THERAPY INITIAL EVALUATION ADULT - PERTINENT HX OF CURRENT PROBLEM, REHAB EVAL
Pt is a 47 year old male transferred from outside hospital for evaluation of right finger swelling/pain. Pt with a pre-operative diagnosis necrotic ulceration of fingers. Pt POD#1 s/p right 3rd finger amputation at metacarpal head, hand I&D and CTR. PMH significant for bilateral BKA, further PMH below.

## 2023-01-11 NOTE — CONSULT NOTE ADULT - SUBJECTIVE AND OBJECTIVE BOX
HPI: 47M DM2, PVD s/p b/l BKA, ESRD on HD who p/w swelling, pain, and black discoloration of RUE third digit. Pt traces this to trauma s/p a fall he suffered about 1 year ago. This also resulted in injury to RUE 2nd digit which has not been evaluated recently. Pt has had difficulty following up with Hand Surgery due to gaps in insurance coverage. Upon transfer to [facility] from [place], pt underwent RUE 3rd digit amputation at metacarpal head, hand I&D, CTR. Post-op he states he is having pain that is somewhat responsive to analgesia. Otherwise he notes he has had severely impaired vision OS for the last few months. No eye pain or redness. No symptoms OD. Currently denies chest pain, SOB, lightheadedness, palpitations.    Allergies and Intolerances:        Allergies:  	No Known Allergies:     Home Medications:   * Patient Currently Takes Medications as of 10-Sudeep-2023 10:57 documented in Structured Notes  · 	insulin lispro 100 units/mL injectable solution (obsolete): 4 unites prior to meals  	5 units q hs  · 	Vitamin D3:   · 	iron:   · 	Basaglar KwikPen 100 units/mL subcutaneous solution: 5 unit(s) subcutaneous once a day (at bedtime)    Patient History:    Past Medical, Past Surgical, and Family History:  PAST MEDICAL HISTORY:  Diabetes mellitus   ESRD on dialysis   H/O hypotension.     PAST SURGICAL HISTORY:  AV fistula   S/P BKA (below knee amputation) bilateral.     Social History:  · Substance use	No     Tobacco Screening:  · Core Measure Site	No    FHX: none in first degree relatives (parents)    ROS: negative unless otherwise noted in HPI    POCT Blood Glucose.: 197 mg/dL (11 Jan 2023 11:09)  POCT Blood Glucose.: 211 mg/dL (11 Jan 2023 06:47)  POCT Blood Glucose.: 133 mg/dL (10 Sudeep 2023 23:04)  POCT Blood Glucose.: 227 mg/dL (10 Sudeep 2023 20:29)  POCT Blood Glucose.: 205 mg/dL (10 Sudeep 2023 16:54)    I&O's Summary    11 Jan 2023 07:01  -  11 Jan 2023 11:24  --------------------------------------------------------  IN: 0 mL / OUT: 300 mL / NET: -300 mL    PHYSICAL EXAM:  Vital Signs Last 24 Hrs  T(C): 36.9 (11 Jan 2023 09:24), Max: 37.7 (11 Jan 2023 04:30)  T(F): 98.4 (11 Jan 2023 09:24), Max: 99.8 (11 Jan 2023 04:30)  HR: 88 (11 Jan 2023 09:24) (71 - 92)  BP: 92/52 (11 Jan 2023 09:24) (85/45 - 113/57)  BP(mean): 65 (10 Sudeep 2023 21:15) (65 - 74)  RR: 18 (11 Jan 2023 09:24) (16 - 23)  SpO2: 100% (11 Jan 2023 09:24) (98% - 100%)    CONSTITUTIONAL: NAD, well-developed, well-groomed  EYES: EOMI, PERRL, conjunctiva and sclera clear; vision grossly intact OD but impaired OS (pt cannot identify objects at distance of 2 feet)  ENMT: Moist oral mucosa, no pharyngeal injection or exudates; normal dentition  NECK: Supple, no palpable masses; no thyromegaly  RESPIRATORY: Normal respiratory effort; lungs are clear to auscultation bilaterally  CARDIOVASCULAR: Regular rate and rhythm, normal S1 and S2, no murmur/rub/gallop; No lower extremity edema; Peripheral pulses palpable bilaterally; RUE AVF with +thrill  ABDOMEN: Nontender to palpation, normoactive bowel sounds, no rebound/guarding; No hepatosplenomegaly  MUSCULOSKELETAL: s/p b/l BKA; right hand dressing c/d/i; right hand 2nd digit with distal deformity  PSYCH: A+O to person, place, and time; affect appropriate  NEUROLOGY: CN 2-12 are intact and symmetric; no gross sensory deficits   SKIN: No rashes; no palpable lesions    LABS:                        8.4    15.99 )-----------( 436      ( 11 Jan 2023 06:13 )             27.0     01-11    133<L>  |  90<L>  |  23  ----------------------------<  205<H>  3.4<L>   |  27  |  7.00<H>    Ca    8.9      11 Jan 2023 06:13    TPro  8.4<H>  /  Alb  2.7<L>  /  TBili  0.5  /  DBili  x   /  AST  9<L>  /  ALT  13  /  AlkPhos  122<H>  01-10    PT/INR - ( 10 Sudeep 2023 16:50 )   PT: 15.9 sec;   INR: 1.37 ratio    PTT - ( 10 Sudeep 2023 16:50 )  PTT:28.0 sec    Culture - Blood (collected 10 Sudeep 2023 00:20)  Source: .Blood Blood  Preliminary Report (11 Jan 2023 04:02):    No growth to date.    Culture - Blood (collected 10 Sudeep 2023 00:05)  Source: .Blood Blood  Preliminary Report (11 Jan 2023 04:02):    No growth to date.

## 2023-01-11 NOTE — PHYSICAL THERAPY INITIAL EVALUATION ADULT - FOLLOWS COMMANDS/ANSWERS QUESTIONS, REHAB EVAL
Polish speaking primarily; OT present and able to communicate in Polish/75% of the time/able to follow single-step instructions

## 2023-01-11 NOTE — PROGRESS NOTE ADULT - SUBJECTIVE AND OBJECTIVE BOX
Orthopaedic Surgery Progress Note    Subjective:   Patient was seen and examined at bedside. Denies CP/SOB/N/V/HA. Resting comfortably without complaints 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 Pain is controlled.    Objective:  T(C): 37.7 (01-11-23 @ 04:30), Max: 38 (01-10-23 @ 08:34)  HR: 88 (01-11-23 @ 04:30) (71 - 92)  BP: 90/50 (01-11-23 @ 04:30) (85/45 - 113/57)  RR: 18 (01-11-23 @ 04:30) (16 - 23)  SpO2: 100% (01-11-23 @ 04:30) (97% - 100%)  Wt(kg): --      Physical Exam:  R UE:    Dressing clean/dry/intact. ACE wrap in place.   Sensation intact throughout hand. Able to move fingers but ROM limited 2/2 pain.  Extremity warm                          8.7    12.15 )-----------( 460      ( 10 Sudeep 2023 20:25 )             28.3     01-10    131<L>  |  90<L>  |  20  ----------------------------<  205<H>  TNP   |  27  |  6.12<H>    Ca    8.9      10 Sudeep 2023 20:25    TPro  8.4<H>  /  Alb  2.7<L>  /  TBili  0.5  /  DBili  x   /  AST  9<L>  /  ALT  13  /  AlkPhos  122<H>  01-10    PT/INR - ( 10 Sudeep 2023 16:50 )   PT: 15.9 sec;   INR: 1.37 ratio         PTT - ( 10 Sudeep 2023 16:50 )  PTT:28.0 sec

## 2023-01-11 NOTE — CONSULT NOTE ADULT - PROBLEM SELECTOR RECOMMENDATION 2
Recent A1c 7.1, though this may be an underestimate in setting of anemia related to ESRD. Continue qhs lantus and correctional admelog and monitor glucose in-house (goal 100-180).

## 2023-01-11 NOTE — PROGRESS NOTE ADULT - SUBJECTIVE AND OBJECTIVE BOX
Patient is a 47y Male whom presented to the hospital with esrd on hd     PAST MEDICAL & SURGICAL HISTORY:      MEDICATIONS  (STANDING):  dextrose 5%. 1000 milliLiter(s) (100 mL/Hr) IV Continuous <Continuous>  dextrose 5%. 1000 milliLiter(s) (50 mL/Hr) IV Continuous <Continuous>  dextrose 50% Injectable 25 Gram(s) IV Push once  dextrose 50% Injectable 25 Gram(s) IV Push once  dextrose 50% Injectable 12.5 Gram(s) IV Push once  glucagon  Injectable 1 milliGRAM(s) IntraMuscular once  insulin lispro (ADMELOG) corrective regimen sliding scale   SubCutaneous every 6 hours  pantoprazole    Tablet 40 milliGRAM(s) Oral daily  polyethylene glycol 3350 17 Gram(s) Oral daily  senna 2 Tablet(s) Oral at bedtime      Allergies    No Known Allergies    Intolerances        SOCIAL HISTORY:  Denies ETOh,Smoking,     FAMILY HISTORY:      REVIEW OF SYSTEMS:    CONSTITUTIONAL: No weakness, fevers or chills  NECK: No pain or stiffness  RESPIRATORY: No cough, wheezing, hemoptysis; No shortness of breath  CARDIOVASCULAR: No chest pain or palpitations  GASTROINTESTINAL: No abdominal or epigastric pain. No nausea, vomiting,     No diarrhea or constipation. No melena   GENITOURINARY: No dysuria, frequency or hematuria  NEUROLOGICAL: No numbness or weakness  SKIN: dry      VITAL:  T(C): , Max: 38.1 (01-09-23 @ 21:33)  T(F): , Max: 100.6 (01-09-23 @ 21:33)  HR: 85 (01-10-23 @ 08:34)  BP: 89/54 (01-10-23 @ 08:34)  BP(mean): --  RR: 18 (01-10-23 @ 08:34)  SpO2: 97% (01-10-23 @ 08:34)  Wt(kg): --    I and O's:    Height (cm): 170.2 (01-09 @ 21:33)  Weight (kg): 65.8 (01-09 @ 21:33)  BMI (kg/m2): 22.7 (01-09 @ 21:33)  BSA (m2): 1.76 (01-09 @ 21:33)    PHYSICAL EXAM:    Constitutional: NAD  HEENT: conjunctive   clear   Neck:  No JVD  Respiratory: CTAB  Cardiovascular: S1 and S2  Gastrointestinal: BS+, soft, NT/ND    LABS:                        9.2    13.97 )-----------( 466      ( 10 Sudeep 2023 00:27 )             29.1     01-10    136  |  94<L>  |  13  ----------------------------<  327<H>  3.3<L>   |  33<H>  |  5.20<H>    Ca    9.1      10 Sudeep 2023 00:27    TPro  8.4<H>  /  Alb  2.7<L>  /  TBili  0.5  /  DBili  x   /  AST  9<L>  /  ALT  13  /  AlkPhos  122<H>  01-10      Urine Studies:          RADIOLOGY & ADDITIONAL STUDIES:

## 2023-01-11 NOTE — OCCUPATIONAL THERAPY INITIAL EVALUATION ADULT - RANGE OF MOTION EXAMINATION, UPPER EXTREMITY
except right wrist/hand not tested due to surgery/bilateral UE Active ROM was WFL  (within functional limits)

## 2023-01-11 NOTE — CONSULT NOTE ADULT - ASSESSMENT
47M DM2, PVD s/p b/l BKA, ESRD on HD who p/w gangrene of RUE third digit, now s/p amputation and hand I&D.

## 2023-01-12 ENCOUNTER — TRANSCRIPTION ENCOUNTER (OUTPATIENT)
Age: 48
End: 2023-01-12

## 2023-01-12 LAB
ANION GAP SERPL CALC-SCNC: 17 MMOL/L — HIGH (ref 7–14)
B PERT DNA SPEC QL NAA+PROBE: SIGNIFICANT CHANGE UP
B PERT+PARAPERT DNA PNL SPEC NAA+PROBE: SIGNIFICANT CHANGE UP
BORDETELLA PARAPERTUSSIS (RAPRVP): SIGNIFICANT CHANGE UP
BUN SERPL-MCNC: 16 MG/DL — SIGNIFICANT CHANGE UP (ref 7–23)
C PNEUM DNA SPEC QL NAA+PROBE: SIGNIFICANT CHANGE UP
CALCIUM SERPL-MCNC: 9.1 MG/DL — SIGNIFICANT CHANGE UP (ref 8.4–10.5)
CHLORIDE SERPL-SCNC: 93 MMOL/L — LOW (ref 98–107)
CO2 SERPL-SCNC: 24 MMOL/L — SIGNIFICANT CHANGE UP (ref 22–31)
CREAT SERPL-MCNC: 5.15 MG/DL — HIGH (ref 0.5–1.3)
CRP SERPL-MCNC: 346.7 MG/L — HIGH
EGFR: 13 ML/MIN/1.73M2 — LOW
FLUAV SUBTYP SPEC NAA+PROBE: SIGNIFICANT CHANGE UP
FLUBV RNA SPEC QL NAA+PROBE: SIGNIFICANT CHANGE UP
GLUCOSE BLDC GLUCOMTR-MCNC: 196 MG/DL — HIGH (ref 70–99)
GLUCOSE BLDC GLUCOMTR-MCNC: 220 MG/DL — HIGH (ref 70–99)
GLUCOSE BLDC GLUCOMTR-MCNC: 224 MG/DL — HIGH (ref 70–99)
GLUCOSE BLDC GLUCOMTR-MCNC: 265 MG/DL — HIGH (ref 70–99)
GLUCOSE SERPL-MCNC: 241 MG/DL — HIGH (ref 70–99)
HADV DNA SPEC QL NAA+PROBE: SIGNIFICANT CHANGE UP
HCOV 229E RNA SPEC QL NAA+PROBE: SIGNIFICANT CHANGE UP
HCOV HKU1 RNA SPEC QL NAA+PROBE: SIGNIFICANT CHANGE UP
HCOV NL63 RNA SPEC QL NAA+PROBE: SIGNIFICANT CHANGE UP
HCOV OC43 RNA SPEC QL NAA+PROBE: SIGNIFICANT CHANGE UP
HMPV RNA SPEC QL NAA+PROBE: SIGNIFICANT CHANGE UP
HPIV1 RNA SPEC QL NAA+PROBE: SIGNIFICANT CHANGE UP
HPIV2 RNA SPEC QL NAA+PROBE: SIGNIFICANT CHANGE UP
HPIV3 RNA SPEC QL NAA+PROBE: SIGNIFICANT CHANGE UP
HPIV4 RNA SPEC QL NAA+PROBE: SIGNIFICANT CHANGE UP
M PNEUMO DNA SPEC QL NAA+PROBE: SIGNIFICANT CHANGE UP
POTASSIUM SERPL-MCNC: 4.3 MMOL/L — SIGNIFICANT CHANGE UP (ref 3.5–5.3)
POTASSIUM SERPL-SCNC: 4.3 MMOL/L — SIGNIFICANT CHANGE UP (ref 3.5–5.3)
RAPID RVP RESULT: SIGNIFICANT CHANGE UP
RSV RNA SPEC QL NAA+PROBE: SIGNIFICANT CHANGE UP
RV+EV RNA SPEC QL NAA+PROBE: SIGNIFICANT CHANGE UP
SARS-COV-2 RNA SPEC QL NAA+PROBE: SIGNIFICANT CHANGE UP
SODIUM SERPL-SCNC: 134 MMOL/L — LOW (ref 135–145)
VANCOMYCIN FLD-MCNC: 15.4 UG/ML — SIGNIFICANT CHANGE UP

## 2023-01-12 PROCEDURE — 99223 1ST HOSP IP/OBS HIGH 75: CPT

## 2023-01-12 PROCEDURE — 93990 DOPPLER FLOW TESTING: CPT | Mod: 26

## 2023-01-12 PROCEDURE — 99233 SBSQ HOSP IP/OBS HIGH 50: CPT

## 2023-01-12 PROCEDURE — 93931 UPPER EXTREMITY STUDY: CPT | Mod: 26,RT

## 2023-01-12 PROCEDURE — 99223 1ST HOSP IP/OBS HIGH 75: CPT | Mod: 57

## 2023-01-12 RX ORDER — DORZOLAMIDE HYDROCHLORIDE TIMOLOL MALEATE 20; 5 MG/ML; MG/ML
1 SOLUTION/ DROPS OPHTHALMIC
Refills: 0 | Status: DISCONTINUED | OUTPATIENT
Start: 2023-01-12 | End: 2023-01-30

## 2023-01-12 RX ORDER — MUPIROCIN 20 MG/G
1 OINTMENT TOPICAL
Refills: 0 | Status: COMPLETED | OUTPATIENT
Start: 2023-01-12 | End: 2023-01-17

## 2023-01-12 RX ORDER — HEPARIN SODIUM 5000 [USP'U]/ML
5000 INJECTION INTRAVENOUS; SUBCUTANEOUS ONCE
Refills: 0 | Status: COMPLETED | OUTPATIENT
Start: 2023-01-12 | End: 2023-01-12

## 2023-01-12 RX ADMIN — MUPIROCIN 1 APPLICATION(S): 20 OINTMENT TOPICAL at 17:57

## 2023-01-12 RX ADMIN — Medication 2: at 11:59

## 2023-01-12 RX ADMIN — HEPARIN SODIUM 5000 UNIT(S): 5000 INJECTION INTRAVENOUS; SUBCUTANEOUS at 01:20

## 2023-01-12 RX ADMIN — PIPERACILLIN AND TAZOBACTAM 25 GRAM(S): 4; .5 INJECTION, POWDER, LYOPHILIZED, FOR SOLUTION INTRAVENOUS at 02:15

## 2023-01-12 RX ADMIN — MIDODRINE HYDROCHLORIDE 10 MILLIGRAM(S): 2.5 TABLET ORAL at 17:57

## 2023-01-12 RX ADMIN — HEPARIN SODIUM 5000 UNIT(S): 5000 INJECTION INTRAVENOUS; SUBCUTANEOUS at 17:57

## 2023-01-12 RX ADMIN — FAMOTIDINE 20 MILLIGRAM(S): 10 INJECTION INTRAVENOUS at 17:56

## 2023-01-12 RX ADMIN — DORZOLAMIDE HYDROCHLORIDE TIMOLOL MALEATE 1 DROP(S): 20; 5 SOLUTION/ DROPS OPHTHALMIC at 17:57

## 2023-01-12 RX ADMIN — Medication 975 MILLIGRAM(S): at 15:17

## 2023-01-12 RX ADMIN — HEPARIN SODIUM 5000 UNIT(S): 5000 INJECTION INTRAVENOUS; SUBCUTANEOUS at 10:53

## 2023-01-12 RX ADMIN — CHLORHEXIDINE GLUCONATE 1 APPLICATION(S): 213 SOLUTION TOPICAL at 15:19

## 2023-01-12 RX ADMIN — FAMOTIDINE 20 MILLIGRAM(S): 10 INJECTION INTRAVENOUS at 05:14

## 2023-01-12 RX ADMIN — MIDODRINE HYDROCHLORIDE 10 MILLIGRAM(S): 2.5 TABLET ORAL at 13:31

## 2023-01-12 RX ADMIN — PIPERACILLIN AND TAZOBACTAM 25 GRAM(S): 4; .5 INJECTION, POWDER, LYOPHILIZED, FOR SOLUTION INTRAVENOUS at 15:18

## 2023-01-12 RX ADMIN — Medication 1 TABLET(S): at 15:19

## 2023-01-12 RX ADMIN — Medication 6: at 07:38

## 2023-01-12 RX ADMIN — MIDODRINE HYDROCHLORIDE 10 MILLIGRAM(S): 2.5 TABLET ORAL at 05:15

## 2023-01-12 RX ADMIN — Medication 975 MILLIGRAM(S): at 05:14

## 2023-01-12 RX ADMIN — Medication 4: at 16:55

## 2023-01-12 RX ADMIN — CHLORHEXIDINE GLUCONATE 1 APPLICATION(S): 213 SOLUTION TOPICAL at 06:12

## 2023-01-12 NOTE — PROGRESS NOTE ADULT - SUBJECTIVE AND OBJECTIVE BOX
Patient is a 47y Male whom presented to the hospital with esrd on hd     PAST MEDICAL & SURGICAL HISTORY:      MEDICATIONS  (STANDING):  dextrose 5%. 1000 milliLiter(s) (100 mL/Hr) IV Continuous <Continuous>  dextrose 5%. 1000 milliLiter(s) (50 mL/Hr) IV Continuous <Continuous>  dextrose 50% Injectable 25 Gram(s) IV Push once  dextrose 50% Injectable 25 Gram(s) IV Push once  dextrose 50% Injectable 12.5 Gram(s) IV Push once  glucagon  Injectable 1 milliGRAM(s) IntraMuscular once  insulin lispro (ADMELOG) corrective regimen sliding scale   SubCutaneous every 6 hours  pantoprazole    Tablet 40 milliGRAM(s) Oral daily  polyethylene glycol 3350 17 Gram(s) Oral daily  senna 2 Tablet(s) Oral at bedtime      Allergies    No Known Allergies    Intolerances        SOCIAL HISTORY:  Denies ETOh,Smoking,     FAMILY HISTORY:      REVIEW OF SYSTEMS:    CONSTITUTIONAL: No weakness, fevers or chills  NECK: No pain or stiffness  RESPIRATORY: No cough, wheezing, hemoptysis; No shortness of breath  CARDIOVASCULAR: No chest pain or palpitations  GASTROINTESTINAL: No abdominal or epigastric pain. No nausea, vomiting,                               8.4    15.99 )-----------( 436      ( 11 Jan 2023 06:13 )             27.0       CBC Full  -  ( 11 Jan 2023 06:13 )  WBC Count : 15.99 K/uL  RBC Count : 2.90 M/uL  Hemoglobin : 8.4 g/dL  Hematocrit : 27.0 %  Platelet Count - Automated : 436 K/uL  Mean Cell Volume : 93.1 fL  Mean Cell Hemoglobin : 29.0 pg  Mean Cell Hemoglobin Concentration : 31.1 gm/dL  Auto Neutrophil # : x  Auto Lymphocyte # : x  Auto Monocyte # : x  Auto Eosinophil # : x  Auto Basophil # : x  Auto Neutrophil % : x  Auto Lymphocyte % : x  Auto Monocyte % : x  Auto Eosinophil % : x  Auto Basophil % : x      01-12    134<L>  |  93<L>  |  16  ----------------------------<  241<H>  4.3   |  24  |  5.15<H>    Ca    9.1      12 Jan 2023 06:46        CAPILLARY BLOOD GLUCOSE      POCT Blood Glucose.: 220 mg/dL (12 Jan 2023 16:48)  POCT Blood Glucose.: 196 mg/dL (12 Jan 2023 11:09)  POCT Blood Glucose.: 265 mg/dL (12 Jan 2023 07:23)  POCT Blood Glucose.: 200 mg/dL (11 Jan 2023 21:35)      Vital Signs Last 24 Hrs  T(C): 37.4 (12 Jan 2023 17:48), Max: 37.5 (11 Jan 2023 21:57)  T(F): 99.4 (12 Jan 2023 17:48), Max: 99.5 (11 Jan 2023 21:57)  HR: 98 (12 Jan 2023 17:48) (72 - 98)  BP: 106/47 (12 Jan 2023 17:48) (101/45 - 110/51)  BP(mean): --  RR: 18 (12 Jan 2023 17:48) (16 - 18)  SpO2: 97% (12 Jan 2023 17:48) (97% - 100%)    Parameters below as of 12 Jan 2023 17:48  Patient On (Oxygen Delivery Method): room air                PHYSICAL EXAM:    Constitutional: NAD  HEENT: conjunctive   clear   Neck:  No JVD  Respiratory: CTAB  Cardiovascular: S1 and S2  Gastrointestinal: BS+, soft, NT/ND

## 2023-01-12 NOTE — PROGRESS NOTE ADULT - PROBLEM SELECTOR PLAN 1
Suspect related to DM2 as well as ESRD with RUE fistula.   - Recommend Vascular Surgery consult.   - Consider additional RUE imaging or workup given suboptimal CT angio.   - Continue pt on prophylactic heparin at this time and optimize DM2 as below.     #RUE 3rd digit gangrene:  - Appreciate ID recommendations. Agree with IV zosyn and vancomycin by level for continued coverage of hand soft tissue infection. F/u OR cultures.    #SUSI: Pt anticipated to RTOR for repeat I&D. RCRI = 2 (Class III) connoting 10.1% 30-day risk of major adverse cardiac event. Pt is without acute cardiac condition and tolerated first I&D. As workup is unlikely to , pt may RTOR for repeat I&D without further cardiac testing.

## 2023-01-12 NOTE — PROGRESS NOTE ADULT - SUBJECTIVE AND OBJECTIVE BOX
Orthopaedic Surgery Progress Note    Subjective:   Patient was seen and examined at bedside. Denies CP/SOB/N/V/HA. Resting comfortably without complaints 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 Pain is controlled.    Vital Signs Last 24 Hrs  T(C): 36.6 (12 Jan 2023 05:12), Max: 37.6 (11 Jan 2023 08:00)  T(F): 97.8 (12 Jan 2023 05:12), Max: 99.6 (11 Jan 2023 08:00)  HR: 77 (12 Jan 2023 05:12) (77 - 91)  BP: 104/46 (12 Jan 2023 05:12) (90/43 - 110/51)  BP(mean): --  RR: 16 (12 Jan 2023 05:12) (16 - 18)  SpO2: 99% (12 Jan 2023 05:12) (99% - 100%)    Parameters below as of 12 Jan 2023 05:12  Patient On (Oxygen Delivery Method): room air      Physical Exam:  Gen: NAD  Resp: Nonlabored  R UE:    Dressing changed  open site distally without purulence or bleeding  incisions c/d/i  some tenderness to palpation over other finger tips (chronic deformity to tip of thumb and long finger)  some tenderness to palpation over wrist, slight erythema  Rest of hand WWP                                   8.4    15.99 )-----------( 436      ( 11 Jan 2023 06:13 )             27.0

## 2023-01-12 NOTE — PROGRESS NOTE ADULT - SUBJECTIVE AND OBJECTIVE BOX
Patient is a 47y old  Male who presents with a chief complaint of s/p Right 3rd finger amputation at metacarpal head, hand I&D and CTR (10 Sudeep 2023 21:43)      SUBJECTIVE / OVERNIGHT EVENTS: No acute overnight event. Pain is fairly well controlled on analgesia. Denies chest pain, SOB, palpitations, lightheadedness.    MEDICATIONS  (STANDING):  acetaminophen     Tablet .. 975 milliGRAM(s) Oral every 8 hours  chlorhexidine 2% Cloths 1 Application(s) Topical <User Schedule>  chlorhexidine 2% Cloths 1 Application(s) Topical daily  dextrose 5%. 1000 milliLiter(s) (50 mL/Hr) IV Continuous <Continuous>  dextrose 5%. 1000 milliLiter(s) (100 mL/Hr) IV Continuous <Continuous>  dextrose 5%. 1000 milliLiter(s) (100 mL/Hr) IV Continuous <Continuous>  dextrose 5%. 1000 milliLiter(s) (50 mL/Hr) IV Continuous <Continuous>  dextrose 50% Injectable 25 Gram(s) IV Push once  dextrose 50% Injectable 12.5 Gram(s) IV Push once  dextrose 50% Injectable 25 Gram(s) IV Push once  dextrose 50% Injectable 25 Gram(s) IV Push once  dextrose 50% Injectable 12.5 Gram(s) IV Push once  dextrose 50% Injectable 25 Gram(s) IV Push once  epoetin deidre-epbx (RETACRIT) Injectable 78920 Unit(s) IV Push <User Schedule>  famotidine    Tablet 20 milliGRAM(s) Oral every 12 hours  glucagon  Injectable 1 milliGRAM(s) IntraMuscular once  glucagon  Injectable 1 milliGRAM(s) IntraMuscular once  heparin   Injectable 5000 Unit(s) SubCutaneous every 8 hours  insulin glargine Injectable (LANTUS) 5 Unit(s) SubCutaneous at bedtime  insulin lispro (ADMELOG) corrective regimen sliding scale   SubCutaneous three times a day before meals  midodrine. 10 milliGRAM(s) Oral three times a day  multivitamin 1 Tablet(s) Oral daily  mupirocin 2% Ointment 1 Application(s) Both Nostrils two times a day  piperacillin/tazobactam IVPB.. 3.375 Gram(s) IV Intermittent every 12 hours    MEDICATIONS  (PRN):  aluminum hydroxide/magnesium hydroxide/simethicone Suspension 30 milliLiter(s) Oral four times a day PRN Indigestion  bisacodyl Suppository 10 milliGRAM(s) Rectal daily PRN If no bowel movement  dextrose Oral Gel 15 Gram(s) Oral once PRN Blood Glucose LESS THAN 70 milliGRAM(s)/deciliter  dextrose Oral Gel 15 Gram(s) Oral once PRN Blood Glucose LESS THAN 70 milliGRAM(s)/deciliter  magnesium hydroxide Suspension 30 milliLiter(s) Oral daily PRN Constipation  oxyCODONE    IR 10 milliGRAM(s) Oral every 4 hours PRN Severe Pain (7 - 10)      CAPILLARY BLOOD GLUCOSE      POCT Blood Glucose.: 265 mg/dL (12 Jan 2023 07:23)  POCT Blood Glucose.: 200 mg/dL (11 Jan 2023 21:35)  POCT Blood Glucose.: 160 mg/dL (11 Jan 2023 16:45)  POCT Blood Glucose.: 139 mg/dL (11 Jan 2023 14:29)  POCT Blood Glucose.: 197 mg/dL (11 Jan 2023 11:09)    I&O's Summary    11 Jan 2023 07:01  -  12 Jan 2023 07:00  --------------------------------------------------------  IN: 400 mL / OUT: 1500 mL / NET: -1100 mL        PHYSICAL EXAM:  Vital Signs Last 24 Hrs  T(C): 36.6 (12 Jan 2023 09:34), Max: 37.5 (11 Jan 2023 21:57)  T(F): 97.9 (12 Jan 2023 09:34), Max: 99.5 (11 Jan 2023 21:57)  HR: 72 (12 Jan 2023 09:34) (72 - 91)  BP: 103/49 (12 Jan 2023 09:34) (100/52 - 110/51)  BP(mean): --  RR: 17 (12 Jan 2023 09:34) (16 - 18)  SpO2: 100% (12 Jan 2023 09:34) (99% - 100%)    Parameters below as of 12 Jan 2023 09:34  Patient On (Oxygen Delivery Method): room air      CONSTITUTIONAL: NAD, well-developed, well-groomed  EYES: EOMI, PERRL, vision grossly impaired OS  ENMT: Moist oral mucosa, no pharyngeal injection or exudates; normal dentition  NECK: Supple, no palpable masses; no thyromegaly  RESPIRATORY: Normal respiratory effort; lungs are clear to auscultation bilaterally  CARDIOVASCULAR: Regular rate and rhythm, normal S1 and S2, no murmur/rub/gallop; No lower extremity edema; Peripheral pulses are 2+ bilaterally  ABDOMEN: Nontender to palpation, normoactive bowel sounds, no rebound/guarding; No hepatosplenomegaly  MUSCULOSKELETAL: s/p b/l BKA; RUE s/p 3rd digit amp, dressing c/d/i; RUE 2nd digit with deformity  PSYCH: A+O to person, place, and time; affect appropriate  SKIN: No rashes; no palpable lesions    LABS:                        8.4    15.99 )-----------( 436      ( 11 Jan 2023 06:13 )             27.0     01-12    134<L>  |  93<L>  |  16  ----------------------------<  241<H>  4.3   |  24  |  5.15<H>    Ca    9.1      12 Jan 2023 06:46      PT/INR - ( 10 Sudeep 2023 16:50 )   PT: 15.9 sec;   INR: 1.37 ratio         PTT - ( 10 Sudeep 2023 16:50 )  PTT:28.0 sec          Culture - Fungal, Other (collected 10 Sudeep 2023 18:08)  Source: .Other RIGHT DORSAL HAND #2  Preliminary Report (12 Jan 2023 09:25):    Testing in progress    Culture - Acid Fast - Other w/Smear (collected 10 Sudeep 2023 18:08)  Source: .Other RIGHT DORSAL HAND #1    Culture - Fungal, Other (collected 10 Sudeep 2023 18:08)  Source: .Other RIGHT DORSAL HAND #1  Preliminary Report (12 Jan 2023 09:25):    Testing in progress    Culture - Blood (collected 10 Sudeep 2023 16:20)  Source: .Blood Blood-Peripheral  Preliminary Report (11 Jan 2023 19:02):    No growth to date.    Culture - Blood (collected 10 Sudeep 2023 16:10)  Source: .Blood Blood-Peripheral  Preliminary Report (11 Jan 2023 19:02):    No growth to date.    Culture - Blood (collected 10 Sudeep 2023 00:20)  Source: .Blood Blood  Preliminary Report (11 Jan 2023 04:02):    No growth to date.    Culture - Blood (collected 10 Sudeep 2023 00:05)  Source: .Blood Blood  Preliminary Report (11 Jan 2023 04:02):    No growth to date.        RADIOLOGY & ADDITIONAL TESTS:  Results Reviewed:   Imaging Personally Reviewed:  Electrocardiogram Personally Reviewed:    COORDINATION OF CARE:  Care Discussed with Consultants/Other Providers [Y/N]:  Prior or Outpatient Records Reviewed [Y/N]:

## 2023-01-12 NOTE — PROGRESS NOTE ADULT - ASSESSMENT
A/P: 47y y/o Male s/p Right 3rd finger amputation at metacarpal head, hand I&D and CTR,     -Pain control/analgesia  -DVT ppx - Venodynes/HSQ  -FU AM Labs  -Non-weight bearing right upper extremity in bulky dressing  -Daily dressing and packing changes  -FU nephrology and HD (last on 1/11 AM)  -FU ID recs (vanc, zosyn)  -Appreciate optho recs: outpatient follow-up  -RTOR for repeat I&D likely Saturday AM, will need repeat clearance

## 2023-01-12 NOTE — PROGRESS NOTE ADULT - ASSESSMENT
46 y/o male PMH DM2, Bilateral BKA, ESRD (on HD MWF), last dialyzed yesterday transferred from [facility] emergently for gangrenous finger consulted for vision loss left eye, found to have NLP vision OS 2/2 severe proliferative diabetic retinopathy OU. Also with elevated IOP OS, though no pain.     1. Severe PDR with chronic NLP OS and elevated IOP OS  - Vision at baseline for at least past 4 months  - Elevated IOP OS possibly 2/2 element of NVG  - Start Cosopt BID OS  - No active ophthalmic intervention indicated, pt should follow up with his ophthalmologist, Dr. Gil Pettit ([phone]) after discharge.    Seen and discussed with Dr. Pedraza.     Outpatient Follow-up: Patient should follow-up with his/her ophthalmologist or with [facility] Department of Ophthalmology within 1 week of after discharge at:    [address] [address]  [place] [address] [phone]

## 2023-01-12 NOTE — PROGRESS NOTE ADULT - NUTRITIONAL ASSESSMENT
MEDICATIONS  (STANDING):  acetaminophen     Tablet .. 975 milliGRAM(s) Oral every 8 hours  chlorhexidine 2% Cloths 1 Application(s) Topical <User Schedule>  chlorhexidine 2% Cloths 1 Application(s) Topical daily  dextrose 5%. 1000 milliLiter(s) (50 mL/Hr) IV Continuous <Continuous>  dextrose 5%. 1000 milliLiter(s) (100 mL/Hr) IV Continuous <Continuous>  dextrose 5%. 1000 milliLiter(s) (100 mL/Hr) IV Continuous <Continuous>  dextrose 5%. 1000 milliLiter(s) (50 mL/Hr) IV Continuous <Continuous>  dextrose 50% Injectable 25 Gram(s) IV Push once  dextrose 50% Injectable 12.5 Gram(s) IV Push once  dextrose 50% Injectable 25 Gram(s) IV Push once  dextrose 50% Injectable 25 Gram(s) IV Push once  dextrose 50% Injectable 12.5 Gram(s) IV Push once  dextrose 50% Injectable 25 Gram(s) IV Push once  epoetin deidre-epbx (RETACRIT) Injectable 53349 Unit(s) IV Push <User Schedule>  famotidine    Tablet 20 milliGRAM(s) Oral every 12 hours  glucagon  Injectable 1 milliGRAM(s) IntraMuscular once  glucagon  Injectable 1 milliGRAM(s) IntraMuscular once  heparin   Injectable 5000 Unit(s) SubCutaneous every 8 hours  insulin glargine Injectable (LANTUS) 5 Unit(s) SubCutaneous at bedtime  insulin lispro (ADMELOG) corrective regimen sliding scale   SubCutaneous three times a day before meals  midodrine. 10 milliGRAM(s) Oral three times a day  multivitamin 1 Tablet(s) Oral daily  piperacillin/tazobactam IVPB.. 3.375 Gram(s) IV Intermittent every 12 hours  sodium chloride 0.9%. 1000 milliLiter(s) (100 mL/Hr) IV Continuous <Continuous>  vancomycin  IVPB 500 milliGRAM(s) IV Intermittent once

## 2023-01-12 NOTE — CONSULT NOTE ADULT - ASSESSMENT
46 y/o male PMH DM2, Bilateral BKA, ESRD (on HD MWF), pending fistulogram with Vacular surgery tomorrow    -No recent Change in clinical status  -No compaints of Chest pain, shortness of breath, Dyspnea on exertion, palpitations, or syncope  -EKG  TTE:  -No significant Obstructive Valvular disease  -Not in ADHF  -No current or ACS within the past 30 days  -If patient on BB chronically, please continue.   -If patient taking statin, please continue  - If patient taking  ACEI/ARB, can continue, but if stopped, please restart as soon as clinically possible after surgery.   -c/w aspirin unless the risk of bleeding outweighs the benefits of continuing in the setting of prior PCI.  Close perioperative monitoring  -No further cardiovascular testing is reuired prior to proceeding with procedure    Nick Gooden, Cardiology Fellow, F-1    For all New Consults and Questions:  [email or web address]   Login: [email or web address]    *** Note not final until signed by attending     48 y/o male PMH DM2, Bilateral BKA, ESRD (on HD MWF), pending fistulogram with Vacular surgery tomorrow    -No recent Change in clinical status  -No compaints of Chest pain, shortness of breath, Dyspnea on exertion, palpitations, or syncope  -EKG  TTE:  -Not in ADHF  -No current or ACS within the past 30 days  -If patient on BB chronically, please continue.   -If patient taking statin, please continue  - If patient taking  ACEI/ARB, can continue, but if stopped, please restart as soon as clinically possible after surgery.   -c/w aspirin unless the risk of bleeding outweighs the benefits of continuing in the setting of prior PCI.  Close perioperative monitoring  -No further cardiovascular testing is reuired prior to proceeding with procedure    Nick Gooden, Cardiology Fellow, F-1    For all New Consults and Questions:  [email or web address]   Login: [email or web address]    *** Note not final until signed by attending     46 y/o male PMH DM2, Bilateral BKA, ESRD (on HD MWF), pending fistulogram with Vacular surgery tomorrow    -No recent Change in clinical status  -No compaints of Chest pain, shortness of breath, Dyspnea on exertion, palpitations, or syncope  -EKG  TTE:  -Not in ADHF  -No current or ACS within the past 30 days  -If patient on BB chronically, please continue.   -If patient taking statin, please continue  - If patient taking  ACEI/ARB, can continue, but if stopped, please restart as soon as clinically possible after surgery.   -c/w aspirin unless the risk of bleeding outweighs the benefits of continuing in the setting of prior PCI.  -Close perioperative monitoring  -No further cardiovascular testing is required prior to Fistulogram only, however, any additional procedure would require TTE as patient with significant , as far as we know, new murmur on exam.     Nick Gooden, Cardiology Fellow, F-1    For all New Consults and Questions:  [email or web address]   Login: tom    *** Note not final until signed by attending

## 2023-01-12 NOTE — PROGRESS NOTE ADULT - SUBJECTIVE AND OBJECTIVE BOX
[facility] DEPARTMENT OF OPHTHALMOLOGY  ------------------------------------------------------------------------------    Interval History: No acute events overnight. Today patient states he has had no vision OS for past 4 months, has been very blurry OS for over a year.     MEDICATIONS  (STANDING):  acetaminophen     Tablet .. 975 milliGRAM(s) Oral every 8 hours  chlorhexidine 2% Cloths 1 Application(s) Topical <User Schedule>  chlorhexidine 2% Cloths 1 Application(s) Topical daily  dextrose 5%. 1000 milliLiter(s) (50 mL/Hr) IV Continuous <Continuous>  dextrose 5%. 1000 milliLiter(s) (100 mL/Hr) IV Continuous <Continuous>  dextrose 5%. 1000 milliLiter(s) (100 mL/Hr) IV Continuous <Continuous>  dextrose 5%. 1000 milliLiter(s) (50 mL/Hr) IV Continuous <Continuous>  dextrose 50% Injectable 25 Gram(s) IV Push once  dextrose 50% Injectable 12.5 Gram(s) IV Push once  dextrose 50% Injectable 25 Gram(s) IV Push once  dextrose 50% Injectable 25 Gram(s) IV Push once  dextrose 50% Injectable 12.5 Gram(s) IV Push once  dextrose 50% Injectable 25 Gram(s) IV Push once  epoetin deidre-epbx (RETACRIT) Injectable 47367 Unit(s) IV Push <User Schedule>  famotidine    Tablet 20 milliGRAM(s) Oral every 12 hours  glucagon  Injectable 1 milliGRAM(s) IntraMuscular once  glucagon  Injectable 1 milliGRAM(s) IntraMuscular once  heparin   Injectable 5000 Unit(s) SubCutaneous once  insulin glargine Injectable (LANTUS) 5 Unit(s) SubCutaneous at bedtime  insulin lispro (ADMELOG) corrective regimen sliding scale   SubCutaneous three times a day before meals  midodrine. 10 milliGRAM(s) Oral three times a day  multivitamin 1 Tablet(s) Oral daily  mupirocin 2% Ointment 1 Application(s) Both Nostrils two times a day  piperacillin/tazobactam IVPB.. 3.375 Gram(s) IV Intermittent every 12 hours    MEDICATIONS  (PRN):  aluminum hydroxide/magnesium hydroxide/simethicone Suspension 30 milliLiter(s) Oral four times a day PRN Indigestion  bisacodyl Suppository 10 milliGRAM(s) Rectal daily PRN If no bowel movement  dextrose Oral Gel 15 Gram(s) Oral once PRN Blood Glucose LESS THAN 70 milliGRAM(s)/deciliter  dextrose Oral Gel 15 Gram(s) Oral once PRN Blood Glucose LESS THAN 70 milliGRAM(s)/deciliter  magnesium hydroxide Suspension 30 milliLiter(s) Oral daily PRN Constipation  oxyCODONE    IR 10 milliGRAM(s) Oral every 4 hours PRN Severe Pain (7 - 10)      VITALS: T(C): 37.1 (01-12-23 @ 13:36)  T(F): 98.8 (01-12-23 @ 13:36), Max: 99.5 (01-11-23 @ 21:57)  HR: 78 (01-12-23 @ 13:36) (72 - 91)  BP: 102/50 (01-12-23 @ 13:36) (101/45 - 110/51)  RR:  (16 - 18)  SpO2:  (99% - 100%)  Wt(kg): --  General: AAO x 3, appropriate mood and affect    Ophthalmology Exam:  Visual acuity (sc): 20/50 OD, NLP OS  Pupils: Min reactive OD, amaurotic OS  Ttono: 16 OD, 28 OS  Extraocular movements (EOMs): Full OU, no pain, no diplopia    Pen Light Exam (PLE)  External: Flat OU  Lids/Lashes/Lacrimal Ducts: Flat OU    Sclera/Conjunctiva: W+Q OU  Cornea: Cl OU  Anterior Chamber: D+F OU    Iris: Flat OU  Lens: PCIOL OU    Fundus Exam: dilated with 1% tropicamide and 2.5% phenylephrine  Approval obtained from primary team for dilation  Patient aware that pupils can remained dilated for at least 4-6 hours  Exam performed with 20D lens    Vitreous: wnl OU  Disc, cup/disc: 0.3 OU, pale OU, fibrosis overlying disc OU  Macula: wnl OU  Vessels: wnl OU  Periphery: sparse PRP OD, inf dehemobinized heme OD, DBH OS

## 2023-01-12 NOTE — CHART NOTE - NSCHARTNOTEFT_GEN_A_CORE
Preop Dx: peripheral vascular disease  Surgeon: Dr. Palmer  Procedure: Right arm fistulagram, right arm angiogarm, revision of arteriovenous fistula    Vital Signs Last 24 Hrs  T(C): 37.1 (2023 13:36), Max: 37.5 (2023 21:57)  T(F): 98.8 (2023 13:36), Max: 99.5 (2023 21:57)  HR: 78 (2023 13:36) (72 - 91)  BP: 102/50 (2023 13:36) (101/45 - 110/51)  BP(mean): --  RR: 18 (2023 13:36) (16 - 18)  SpO2: 100% (2023 13:36) (99% - 100%)    Parameters below as of 2023 13:36  Patient On (Oxygen Delivery Method): room air                            8.4    15.99 )-----------( 436      ( 2023 06:13 )             27.0     01-12    134<L>  |  93<L>  |  16  ----------------------------<  241<H>  4.3   |  24  |  5.15<H>    Ca    9.1      2023 06:46      PT/INR - ( 10 Sudeep 2023 16:50 )   PT: 15.9 sec;   INR: 1.37 ratio         PTT - ( 10 Sudeep 2023 16:50 )  PTT:28.0 sec  Daily     Daily Weight in k.2 (2023 05:12)      A/P: 47M DM2, PVD s/p b/l BKA, ESRD on HD who p/w gangrene of RUE third digit, now s/p amputation and hand I&D.  Plan:   - NPO past midnight, except medications ordered  - Consent signed and in chart  - CBC, BMP, Type and Screen, Coags ordered   - medically cleared and optimized without further cardiac intervention required   - Covid swab ordered Preop Dx: peripheral vascular disease  Surgeon: Dr. Palmer  Procedure: Right arm fistulagram, right arm angiogarm, revision of arteriovenous fistula    Vital Signs Last 24 Hrs  T(C): 37.1 (2023 13:36), Max: 37.5 (2023 21:57)  T(F): 98.8 (2023 13:36), Max: 99.5 (2023 21:57)  HR: 78 (2023 13:36) (72 - 91)  BP: 102/50 (2023 13:36) (101/45 - 110/51)  BP(mean): --  RR: 18 (2023 13:36) (16 - 18)  SpO2: 100% (2023 13:36) (99% - 100%)    Parameters below as of 2023 13:36  Patient On (Oxygen Delivery Method): room air                            8.4    15.99 )-----------( 436      ( 2023 06:13 )             27.0     01-12    134<L>  |  93<L>  |  16  ----------------------------<  241<H>  4.3   |  24  |  5.15<H>    Ca    9.1      2023 06:46      PT/INR - ( 10 Sudeep 2023 16:50 )   PT: 15.9 sec;   INR: 1.37 ratio         PTT - ( 10 Sudeep 2023 16:50 )  PTT:28.0 sec  Daily     Daily Weight in k.2 (2023 05:12)      A/P: 47M DM2, PVD s/p b/l BKA, ESRD on HD who p/w gangrene of RUE third digit, now s/p amputation and hand I&D.  Plan:   - NPO past midnight, except medications ordered  - Consent signed and in chart  - CBC, BMP, Type and Screen, Coags ordered   - medically cleared and optimized   - Requesting cardiac clearance  - Covid swab ordered

## 2023-01-12 NOTE — CONSULT NOTE ADULT - ASSESSMENT
48 y/o M p/w possible steal syndrome from fistula.    -Recommend obtain duplex of fistula to evaluate for venous outflow stenosis  -Recommend obtain PPG study WITH COMPRESSION to evaluate for arterial steal  -If patient requires a procedure, will require medical and cardiac clearance  -Rest of care per primary    D/w Vascular Surgery fellow on call    Angela Jenkins MD  PGY-2  C Team Surgery  #[id number] 46 y/o M p/w possible steal syndrome from fistula.    -Will add on patient for fistulogram tomorrow 1/13  -Please obtain preop labs at 1AM, NPO MN, T+S etc.  -Please obtain cardiac and nephrology clearance/risk stratification  -Please DO NOT proceed with further interventions or surgeries until fistula is evaluated  -Recommend obtain duplex of fistula to evaluate for venous outflow stenosis  -Recommend obtain PPG study WITH COMPRESSION to evaluate for arterial steal  -Rest of care per primary    D/w Vascular Surgery fellow on call    Angela Jenkins MD  PGY-2  C Team Surgery  #[id number] 48 y/o M p/w possible steal syndrome from fistula.    -Please obtain preop labs at 1AM, NPO MN, T+S etc.  -Please obtain cardiac and risk stratification  -Please DO NOT proceed with further interventions or surgeries until fistula is evaluated  -Recommend obtain duplex of fistula to evaluate for venous outflow stenosis  -Recommend obtain PPG study WITH COMPRESSION to evaluate for arterial steal  -Rest of care per primary    D/w Vascular Surgery fellow on call    Angela Jenkins MD  PGY-2  C Team Surgery  #[id number]

## 2023-01-12 NOTE — PROGRESS NOTE ADULT - PROBLEM SELECTOR PLAN 2
Recent A1c 7.1, though this may be an underestimate in setting of anemia related to ESRD. Glucose has been over 200 in the mornings.   - Recommend increasing lantus to 8 units tonight  - Continue correctional admelog and monitor glucose in-house (goal 100-180).

## 2023-01-12 NOTE — PROGRESS NOTE ADULT - PROBLEM SELECTOR PLAN 4
Pt reporting inability to see out of left eye, and vision grossly impaired on exam. Pt is at risk for diabetic retinopathy as well as other vascular pathology (such as retinal artery/vein occlusion) in setting of polyvascular disease. Appreciate Ophthalmology consult.

## 2023-01-12 NOTE — PROGRESS NOTE ADULT - ASSESSMENT
Patient is a 48 y/o male PMH DM2, Bilateral BKA, ESRD (on HD MWF), last dialyzed yesterday transferred from [facility] emergently for evaluation of evaluation of right finger swelling/pain. Patient reports history of right finger pain after he had a fall one year ago. Patient had a wound on her second/middle finger who he was seeing a hand surgeon for outpatient but unable ultimately to continue seeing due to insurance issues. Patient reports his middle finger developed increased swelling and became black in color about two weeks ago. Denies fevers.   Patient transferred to [facility] for further evaluation and emergent OR. Patient received broad spectrum Abx of Zosyn/vanco/clindamycin at [place].   Interpretor phone used for translation with patient. ID# [id number]              Admit for septic workup and ID evaluation,send blood and urine cx,serial lactate levels,monitor vitals closley,ivfs hydration,monitor urine output and renal profile,iv abx as per id cons    CHRONIC KIDNEY DISEASE, STAGE 5:   Serum creatinine is stable at 5.2 , approximating a GFR of *** ml/min.   There is no progression.  No uremic symptoms. No evidence of  worsening  Anemia. Fluid status stable.   Will continue to avoid nephrotoxic drugs.  Patient remains asymptomatic.  Continue current therapy.    BP monitoring,continue current antihypertensive meds, low salt diet,followup with PMD in 1-2 weeks

## 2023-01-12 NOTE — CONSULT NOTE ADULT - ATTENDING COMMENTS
Pt with severe R hand steal syndrome due to AVF, now s/p 3rd digit amp with necrotic tissue  PPGs reviewed c/w steal   duplex: VF >2L   will plan for R arm angio and proximalization of inflow to improve flow to the hand  would hold off on further hand debridement until flow to the hand is improved  may need AVF ligation if proximalization does not provide enough flow to the hand

## 2023-01-12 NOTE — CONSULT NOTE ADULT - ATTENDING COMMENTS
Personally saw and examined patient  labs and vitals reviewed  agree with above assessment and plan  47M w DM, Bilateral BKA, ESRD (on HD MWF), pending fistulogram with Vacular surgery tomorrow  pt comfortable appearing, no complaints, euvolemic on exam  pt denies active or recent cp or sob at rest of on exertion  pt states he is able to ambulate (with b/l LE prosthesis) more than one block without cp or sob  pt denies hx of VT, VF, SCD  denies orthopnea or extremity edema  systolic ejection murmur on exam noted, please check echo  further pre-op recs to follow results of tte  will follow with you

## 2023-01-12 NOTE — CONSULT NOTE ADULT - SUBJECTIVE AND OBJECTIVE BOX
a 46 y/o male PMH DM2, Bilateral BKA, ESRD (on HD MWF), last dialyzed yesterday transferred from [facility] emergently for evaluation of evaluation of right finger swelling/pain. Patient reports history of right finger pain after he had a fall one year ago. Patient had a wound on her second/middle finger who he was seeing a hand surgeon for outpatient but unable ultimately to continue seeing due to insurance issues. Patient reports his middle finger developed increased swelling and became black in color about two weeks ago. Denies fevers. Patient transferred to [facility] for further evaluation and emergent OR. Patient received broad spectrum Abx of Zosyn/Vanco/Clindamycin at [place].     Vascular Surgery is consulted i/s/o consideration for Steal Syndrome. Patient endorses he received his fistula several years ago at [facility] by a Dr. Bermeo, who he endorses is a radiologist. CTA of UE ordered which shows a patent brachiocephalic fistula as well as patent subclavian artery. The patient endorses he has pain during dialysis in his fingers as well as some neuropathic symptoms.     PMH; As above    PSH: As above    Allergies: NKDA    Physical exam:    /49 HR 72 Spo2 100 T 97.9 RR 17    General- Younger man. In distress from pain  Extremities- LUE with scar from above antecubital         a 48 y/o male PMH DM2, Bilateral BKA, ESRD (on HD MWF), last dialyzed yesterday transferred from [facility] emergently for evaluation of evaluation of right finger swelling/pain. Patient reports history of right finger pain after he had a fall one year ago. Patient had a wound on her second/middle finger who he was seeing a hand surgeon for outpatient but unable ultimately to continue seeing due to insurance issues. Patient reports his middle finger developed increased swelling and became black in color about two weeks ago. Denies fevers. Patient transferred to [facility] for further evaluation and emergent OR. Patient received broad spectrum Abx of Zosyn/Vanco/Clindamycin at [place].     Vascular Surgery is consulted i/s/o consideration for Steal Syndrome. Patient endorses he received his fistula several years ago at [facility] by a Dr. Bermeo, who he endorses is a radiologist. CTA of UE ordered which shows a patent brachiocephalic fistula as well as patent subclavian artery. The patient endorses he has pain during dialysis in his fingers as well as some neuropathic symptoms. Now     PMH; As above    PSH: As above    Allergies: NKDA    Physical exam:    /49 HR 72 Spo2 100 T 97.9 RR 17    General- Younger man. In distress from pain  Extremities- RUE with scar from above antecubital fossa to axilla, well healed. Brachial thrill palpable, radial artery with weak dopplerable signal, ulnar artery with strongly dopplerable signal. Hand significantly swollen, with missing third digit and dry gangrene of second finger. Volar incision noted w/ non absorbable suture  Lungs: Comfortable on room air    Imaging:    ACC: [id number] EXAM: CT ANGIO UPR EXT (W)AW IC RT    PROCEDURE DATE: 01/10/2023        INTERPRETATION: [facility] RADIOLOGIST PRELIMINARY REPORT    PROCEDURE INFORMATION:  Exam: CTA Right Upper Extremity With Contrast  Exam date and time: 1/10/2023 2:53 AM  Age: 47 years old  Clinical indication: Esrd, rue fistula, gangrene to middle finger with hand  cellulitis, R/O vascular    TECHNIQUE:  Imaging protocol: Computed tomographic angiography of the Right upper extremity  with contrast, including non-contrast images if performed.    COMPARISON:  DX XR HAND 3 VIEWS RIGHT 1/10/2023 2:00 AM    FINDINGS:  Limitations: Some images are degraded by artifacts from metallic clips in the  soft tissues.    The right subclavian axillary and brachial artery are patent to the elbow. There is a AV fistula at the level of the elbow. Evaluation of the anastomosis limited by clip artifact. The fistula is patent. Below the elbow, evaluation of the brachial artery is limited due to poor opacification. The dominant flow to the wrist is via the ulnar artery, which is calcified. Radial artery is likely patent. Evaluation of the arteries in the hand is limited. There is air in the soft tissues around the third digit likely related to an infectious process.    Multiple enlarged lymph nodes are seen in the left axillary region measuring 3.2 x 1.0 cm. The visualized right lung is clear. There is heterogeneous enhancement of the liver.      IMPRESSION:    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    Evaluation of the brachial artery below the elbow/radial/ulnar arteries is limited. These vessels are grossly patent. The ulnar artery is dominant and there is calcification.    Consider duplex ultrasound of the fistula and runoff to the right hand.    Evaluation of the vessels in the hand is limited.    There is soft tissue swelling and gas in the region of the third digit compatible with an infectious process.    --- End of Report ---             a 46 y/o male PMH DM2, Bilateral BKA, ESRD (on HD MWF), last dialyzed yesterday transferred from [facility] emergently for evaluation of evaluation of right finger swelling/pain. Patient reports history of right finger pain after he had a fall one year ago. Patient had a wound on her second/middle finger who he was seeing a hand surgeon for outpatient but unable ultimately to continue seeing due to insurance issues. Patient reports his middle finger developed increased swelling and became black in color about two weeks ago. Denies fevers. Patient transferred to [facility] for further evaluation and emergent OR. Patient received broad spectrum Abx of Zosyn/Vanco/Clindamycin at [place]. Now s/p I+D with Orthopedics and another takeback.     Vascular Surgery is consulted i/s/o consideration for Steal Syndrome. Patient endorses he received his fistula several years ago at [facility] by a Dr. Bermeo, who he endorses is a radiologist. CTA of UE ordered which shows a patent brachiocephalic fistula as well as patent subclavian artery. The patient endorses he has pain during dialysis in his fingers as well as some neuropathic symptoms.   PMH; As above    PSH: As above    Allergies: NKDA    Physical exam:    /48 HR 72 Spo2 100 T 97.9 RR 17    General- Younger man. In distress from pain  Extremities- RUE with scar from above antecubital fossa to axilla, well healed. Brachial thrill palpable, radial artery with weak dopplerable signal, ulnar artery with strongly dopplerable signal. Hand significantly swollen, with missing third digit and dry gangrene of second finger. Volar incision noted w/ non absorbable suture. Fistula without ulceration or aneursymal change.  Lungs: Comfortable on room air    Imaging:    ACC: [id number] EXAM: CT ANGIO UPR EXT (W)AW IC RT    PROCEDURE DATE: 01/10/2023        INTERPRETATION: [facility] RADIOLOGIST PRELIMINARY REPORT    PROCEDURE INFORMATION:  Exam: CTA Right Upper Extremity With Contrast  Exam date and time: 1/10/2023 2:53 AM  Age: 47 years old  Clinical indication: Esrd, rue fistula, gangrene to middle finger with hand  cellulitis, R/O vascular    TECHNIQUE:  Imaging protocol: Computed tomographic angiography of the Right upper extremity  with contrast, including non-contrast images if performed.    COMPARISON:  DX XR HAND 3 VIEWS RIGHT 1/10/2023 2:00 AM    FINDINGS:  Limitations: Some images are degraded by artifacts from metallic clips in the  soft tissues.    The right subclavian axillary and brachial artery are patent to the elbow. There is a AV fistula at the level of the elbow. Evaluation of the anastomosis limited by clip artifact. The fistula is patent. Below the elbow, evaluation of the brachial artery is limited due to poor opacification. The dominant flow to the wrist is via the ulnar artery, which is calcified. Radial artery is likely patent. Evaluation of the arteries in the hand is limited. There is air in the soft tissues around the third digit likely related to an infectious process.    Multiple enlarged lymph nodes are seen in the left axillary region measuring 3.2 x 1.0 cm. The visualized right lung is clear. There is heterogeneous enhancement of the liver.      IMPRESSION:    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    Evaluation of the brachial artery below the elbow/radial/ulnar arteries is limited. These vessels are grossly patent. The ulnar artery is dominant and there is calcification.    Consider duplex ultrasound of the fistula and runoff to the right hand.    Evaluation of the vessels in the hand is limited.    There is soft tissue swelling and gas in the region of the third digit compatible with an infectious process.    --- End of Report ---

## 2023-01-12 NOTE — CONSULT NOTE ADULT - SUBJECTIVE AND OBJECTIVE BOX
Patient seen and evaluated at bedside    Chief Complaint:    HPI:  Patient is a 46 y/o male PMH DM2, Bilateral BKA, ESRD (on HD MWF), last dialyzed yesterday transferred from [facility] emergently for evaluation of evaluation of right finger swelling/pain. Patient reports history of right finger pain after he had a fall one year ago. Patient had a wound on her second/middle finger who he was seeing a hand surgeon for outpatient but unable ultimately to continue seeing due to insurance issues. Patient reports his middle finger developed increased swelling and became black in color about two weeks ago. Denies fevers.   Patient transferred to [facility] for further evaluation and emergent OR. Patient received broad spectrum Abx of Zosyn/vanco/clindamycin at [place].     Interval events: Pt to undergo        (10 Sudeep 2023 15:44)      PMHx:   ESRD on dialysis    H/O hypotension    Diabetes mellitus        PSHx:   S/P BKA (below knee amputation) bilateral    AV fistula        Allergies:  No Known Allergies      Home Meds:    Current Medications:   acetaminophen     Tablet .. 975 milliGRAM(s) Oral every 8 hours  aluminum hydroxide/magnesium hydroxide/simethicone Suspension 30 milliLiter(s) Oral four times a day PRN  bisacodyl Suppository 10 milliGRAM(s) Rectal daily PRN  chlorhexidine 2% Cloths 1 Application(s) Topical <User Schedule>  chlorhexidine 2% Cloths 1 Application(s) Topical daily  dextrose 5%. 1000 milliLiter(s) IV Continuous <Continuous>  dextrose 5%. 1000 milliLiter(s) IV Continuous <Continuous>  dextrose 5%. 1000 milliLiter(s) IV Continuous <Continuous>  dextrose 5%. 1000 milliLiter(s) IV Continuous <Continuous>  dextrose 50% Injectable 25 Gram(s) IV Push once  dextrose 50% Injectable 12.5 Gram(s) IV Push once  dextrose 50% Injectable 25 Gram(s) IV Push once  dextrose 50% Injectable 25 Gram(s) IV Push once  dextrose 50% Injectable 12.5 Gram(s) IV Push once  dextrose 50% Injectable 25 Gram(s) IV Push once  dextrose Oral Gel 15 Gram(s) Oral once PRN  dextrose Oral Gel 15 Gram(s) Oral once PRN  dorzolamide 2%/timolol 0.5% Ophthalmic Solution 1 Drop(s) Left EYE two times a day  epoetin deidre-epbx (RETACRIT) Injectable 82288 Unit(s) IV Push <User Schedule>  famotidine    Tablet 20 milliGRAM(s) Oral every 12 hours  glucagon  Injectable 1 milliGRAM(s) IntraMuscular once  glucagon  Injectable 1 milliGRAM(s) IntraMuscular once  heparin   Injectable 5000 Unit(s) SubCutaneous once  insulin glargine Injectable (LANTUS) 5 Unit(s) SubCutaneous at bedtime  insulin lispro (ADMELOG) corrective regimen sliding scale   SubCutaneous three times a day before meals  magnesium hydroxide Suspension 30 milliLiter(s) Oral daily PRN  midodrine. 10 milliGRAM(s) Oral three times a day  multivitamin 1 Tablet(s) Oral daily  mupirocin 2% Ointment 1 Application(s) Both Nostrils two times a day  oxyCODONE    IR 10 milliGRAM(s) Oral every 4 hours PRN  piperacillin/tazobactam IVPB.. 3.375 Gram(s) IV Intermittent every 12 hours      FAMILY HISTORY:      Social History:  Smoking History:  Alcohol Use:  Drug Use:    REVIEW OF SYSTEMS:  Constitutional:     [ ] negative [ ] fevers [ ] chills [ ] weight loss [ ] weight gain  HEENT:                  [ ] negative [ ] dry eyes [ ] eye irritation [ ] postnasal drip [ ] nasal congestion  CV:                         [ ] negative  [ ] chest pain [ ] orthopnea [ ] palpitations [ ] murmur  Resp:                     [ ] negative [ ] cough [ ] shortness of breath [ ] dyspnea [ ] wheezing [ ] sputum [ ]hemoptysis  GI:                          [ ] negative [ ] nausea [ ] vomiting [ ] diarrhea [ ] constipation [ ] abd pain [ ] dysphagia   :                        [ ] negative [ ] dysuria [ ] nocturia [ ] hematuria [ ] increased urinary frequency  Musculoskeletal: [ ] negative [ ] back pain [ ] myalgias [ ] arthralgias [ ] fracture  Skin:                       [ ] negative [ ] rash [ ] itch  Neurological:        [ ] negative [ ] headache [ ] dizziness [ ] syncope [ ] weakness [ ] numbness  Psychiatric:           [ ] negative [ ] anxiety [ ] depression  Endocrine:            [ ] negative [ ] diabetes [ ] thyroid problem  Heme/Lymph:      [ ] negative [ ] anemia [ ] bleeding problem  Allergic/Immune: [ ] negative [ ] itchy eyes [ ] nasal discharge [ ] hives [ ] angioedema    [ ] All other systems negative  [ ] Unable to assess ROS due to      Physical Exam:  T(F): 98.8 (01-12), Max: 99.5 (01-11)  HR: 78 (01-12) (72 - 91)  BP: 102/50 (01-12) (101/45 - 110/51)  RR: 18 (01-12)  SpO2: 100% (01-12)  GENERAL: No acute distress, well-developed  HEAD:  Atraumatic, Normocephalic  ENT: EOMI, PERRLA, conjunctiva and sclera clear, Neck supple, No JVD, moist mucosa  CHEST/LUNG: Clear to auscultation bilaterally; No wheeze, equal breath sounds bilaterally   BACK: No spinal tenderness  HEART: Regular rate and rhythm; No murmurs, rubs, or gallops  ABDOMEN: Soft, Nontender, Nondistended; Bowel sounds present  EXTREMITIES:  No clubbing, cyanosis, or edema  PSYCH: Nl behavior, nl affect  NEUROLOGY: AAOx3, non-focal, cranial nerves intact  SKIN: Normal color, No rashes or lesions  LINES:    Cardiovascular Diagnostic Testing:    ECG: Personally reviewed:    Echo: Personally reviewed:    Stress Testing:    Cath:    Imaging:    CXR: Personally reviewed    Labs: Personally reviewed                        8.4    15.99 )-----------( 436      ( 11 Jan 2023 06:13 )             27.0     01-12    134<L>  |  93<L>  |  16  ----------------------------<  241<H>  4.3   |  24  |  5.15<H>    Ca    9.1      12 Jan 2023 06:46

## 2023-01-12 NOTE — CHART NOTE - NSCHARTNOTEFT_GEN_A_CORE
Vascular surgery with plans to take patient to OR tomorrow 1/13/2023 for fistulogram.  Spoke with nephrologist Dr. Castillo regarding nephrology clearance.  Per attending Dr. Castillo, patient is optimized for vascular procedure tomorrow from nephro standpoint. No further intervention.   Will coordinate dialysis tomorrow and remain on MWF schedule.

## 2023-01-13 ENCOUNTER — TRANSCRIPTION ENCOUNTER (OUTPATIENT)
Age: 48
End: 2023-01-13

## 2023-01-13 LAB
-  AMIKACIN: SIGNIFICANT CHANGE UP
-  AMIKACIN: SIGNIFICANT CHANGE UP
-  AMOXICILLIN/CLAVULANIC ACID: SIGNIFICANT CHANGE UP
-  AMOXICILLIN/CLAVULANIC ACID: SIGNIFICANT CHANGE UP
-  AMPICILLIN/SULBACTAM: SIGNIFICANT CHANGE UP
-  AMPICILLIN/SULBACTAM: SIGNIFICANT CHANGE UP
-  AMPICILLIN: SIGNIFICANT CHANGE UP
-  AMPICILLIN: SIGNIFICANT CHANGE UP
-  AZTREONAM: SIGNIFICANT CHANGE UP
-  AZTREONAM: SIGNIFICANT CHANGE UP
-  CEFAZOLIN: SIGNIFICANT CHANGE UP
-  CEFAZOLIN: SIGNIFICANT CHANGE UP
-  CEFEPIME: SIGNIFICANT CHANGE UP
-  CEFEPIME: SIGNIFICANT CHANGE UP
-  CEFOXITIN: SIGNIFICANT CHANGE UP
-  CEFOXITIN: SIGNIFICANT CHANGE UP
-  CEFTRIAXONE: SIGNIFICANT CHANGE UP
-  CEFTRIAXONE: SIGNIFICANT CHANGE UP
-  CIPROFLOXACIN: SIGNIFICANT CHANGE UP
-  CIPROFLOXACIN: SIGNIFICANT CHANGE UP
-  ERTAPENEM: SIGNIFICANT CHANGE UP
-  ERTAPENEM: SIGNIFICANT CHANGE UP
-  GENTAMICIN: SIGNIFICANT CHANGE UP
-  GENTAMICIN: SIGNIFICANT CHANGE UP
-  IMIPENEM: SIGNIFICANT CHANGE UP
-  IMIPENEM: SIGNIFICANT CHANGE UP
-  LEVOFLOXACIN: SIGNIFICANT CHANGE UP
-  LEVOFLOXACIN: SIGNIFICANT CHANGE UP
-  MEROPENEM: SIGNIFICANT CHANGE UP
-  MEROPENEM: SIGNIFICANT CHANGE UP
-  PIPERACILLIN/TAZOBACTAM: SIGNIFICANT CHANGE UP
-  PIPERACILLIN/TAZOBACTAM: SIGNIFICANT CHANGE UP
-  TOBRAMYCIN: SIGNIFICANT CHANGE UP
-  TOBRAMYCIN: SIGNIFICANT CHANGE UP
-  TRIMETHOPRIM/SULFAMETHOXAZOLE: SIGNIFICANT CHANGE UP
-  TRIMETHOPRIM/SULFAMETHOXAZOLE: SIGNIFICANT CHANGE UP
ANION GAP SERPL CALC-SCNC: 11 MMOL/L — SIGNIFICANT CHANGE UP (ref 7–14)
ANION GAP SERPL CALC-SCNC: 14 MMOL/L — SIGNIFICANT CHANGE UP (ref 7–14)
APTT BLD: 27.3 SEC — SIGNIFICANT CHANGE UP (ref 27–36.3)
BLD GP AB SCN SERPL QL: NEGATIVE — SIGNIFICANT CHANGE UP
BUN SERPL-MCNC: 14 MG/DL — SIGNIFICANT CHANGE UP (ref 7–23)
BUN SERPL-MCNC: 16 MG/DL — SIGNIFICANT CHANGE UP (ref 7–23)
CALCIUM SERPL-MCNC: 9 MG/DL — SIGNIFICANT CHANGE UP (ref 8.4–10.5)
CALCIUM SERPL-MCNC: 9.1 MG/DL — SIGNIFICANT CHANGE UP (ref 8.4–10.5)
CHLORIDE SERPL-SCNC: 91 MMOL/L — LOW (ref 98–107)
CHLORIDE SERPL-SCNC: 94 MMOL/L — LOW (ref 98–107)
CO2 SERPL-SCNC: 28 MMOL/L — SIGNIFICANT CHANGE UP (ref 22–31)
CO2 SERPL-SCNC: 28 MMOL/L — SIGNIFICANT CHANGE UP (ref 22–31)
CREAT SERPL-MCNC: 4.53 MG/DL — HIGH (ref 0.5–1.3)
CREAT SERPL-MCNC: 4.62 MG/DL — HIGH (ref 0.5–1.3)
CULTURE RESULTS: SIGNIFICANT CHANGE UP
CULTURE RESULTS: SIGNIFICANT CHANGE UP
EGFR: 15 ML/MIN/1.73M2 — LOW
EGFR: 15 ML/MIN/1.73M2 — LOW
GLUCOSE BLDC GLUCOMTR-MCNC: 165 MG/DL — HIGH (ref 70–99)
GLUCOSE BLDC GLUCOMTR-MCNC: 181 MG/DL — HIGH (ref 70–99)
GLUCOSE BLDC GLUCOMTR-MCNC: 188 MG/DL — HIGH (ref 70–99)
GLUCOSE BLDC GLUCOMTR-MCNC: 194 MG/DL — HIGH (ref 70–99)
GLUCOSE BLDC GLUCOMTR-MCNC: 208 MG/DL — HIGH (ref 70–99)
GLUCOSE BLDC GLUCOMTR-MCNC: 227 MG/DL — HIGH (ref 70–99)
GLUCOSE BLDC GLUCOMTR-MCNC: 240 MG/DL — HIGH (ref 70–99)
GLUCOSE SERPL-MCNC: 210 MG/DL — HIGH (ref 70–99)
GLUCOSE SERPL-MCNC: 214 MG/DL — HIGH (ref 70–99)
HCT VFR BLD CALC: 28.8 % — LOW (ref 39–50)
HGB BLD-MCNC: 8.9 G/DL — LOW (ref 13–17)
INR BLD: 1.31 RATIO — HIGH (ref 0.88–1.16)
MAGNESIUM SERPL-MCNC: 2.1 MG/DL — SIGNIFICANT CHANGE UP (ref 1.6–2.6)
MCHC RBC-ENTMCNC: 28.8 PG — SIGNIFICANT CHANGE UP (ref 27–34)
MCHC RBC-ENTMCNC: 30.9 GM/DL — LOW (ref 32–36)
MCV RBC AUTO: 93.2 FL — SIGNIFICANT CHANGE UP (ref 80–100)
METHOD TYPE: SIGNIFICANT CHANGE UP
METHOD TYPE: SIGNIFICANT CHANGE UP
NRBC # BLD: 0 /100 WBCS — SIGNIFICANT CHANGE UP (ref 0–0)
NRBC # FLD: 0 K/UL — SIGNIFICANT CHANGE UP (ref 0–0)
ORGANISM # SPEC MICROSCOPIC CNT: SIGNIFICANT CHANGE UP
PHOSPHATE SERPL-MCNC: 2 MG/DL — LOW (ref 2.5–4.5)
PLATELET # BLD AUTO: 510 K/UL — HIGH (ref 150–400)
POTASSIUM SERPL-MCNC: 3.6 MMOL/L — SIGNIFICANT CHANGE UP (ref 3.5–5.3)
POTASSIUM SERPL-MCNC: 3.8 MMOL/L — SIGNIFICANT CHANGE UP (ref 3.5–5.3)
POTASSIUM SERPL-SCNC: 3.6 MMOL/L — SIGNIFICANT CHANGE UP (ref 3.5–5.3)
POTASSIUM SERPL-SCNC: 3.8 MMOL/L — SIGNIFICANT CHANGE UP (ref 3.5–5.3)
PROTHROM AB SERPL-ACNC: 15.2 SEC — HIGH (ref 10.5–13.4)
RBC # BLD: 3.09 M/UL — LOW (ref 4.2–5.8)
RBC # FLD: 14.6 % — HIGH (ref 10.3–14.5)
RH IG SCN BLD-IMP: POSITIVE — SIGNIFICANT CHANGE UP
SODIUM SERPL-SCNC: 133 MMOL/L — LOW (ref 135–145)
SODIUM SERPL-SCNC: 133 MMOL/L — LOW (ref 135–145)
SPECIMEN SOURCE: SIGNIFICANT CHANGE UP
SPECIMEN SOURCE: SIGNIFICANT CHANGE UP
VANCOMYCIN FLD-MCNC: 11.4 UG/ML — SIGNIFICANT CHANGE UP
WBC # BLD: 16.78 K/UL — HIGH (ref 3.8–10.5)
WBC # FLD AUTO: 16.78 K/UL — HIGH (ref 3.8–10.5)

## 2023-01-13 PROCEDURE — 36832 AV FISTULA REVISION OPEN: CPT | Mod: RT

## 2023-01-13 PROCEDURE — 93010 ELECTROCARDIOGRAM REPORT: CPT

## 2023-01-13 PROCEDURE — 99233 SBSQ HOSP IP/OBS HIGH 50: CPT

## 2023-01-13 PROCEDURE — 93306 TTE W/DOPPLER COMPLETE: CPT | Mod: 26

## 2023-01-13 DEVICE — LIGATING CLIPS WECK HORIZON SMALL-WIDE (RED) 24: Type: IMPLANTABLE DEVICE | Site: RIGHT | Status: FUNCTIONAL

## 2023-01-13 DEVICE — LIGATING CLIPS WECK HORIZON MEDIUM (BLUE) 24: Type: IMPLANTABLE DEVICE | Site: RIGHT | Status: FUNCTIONAL

## 2023-01-13 DEVICE — LIGATING CLIPS WECK HORIZON LARGE (ORANGE) 24: Type: IMPLANTABLE DEVICE | Site: RIGHT | Status: FUNCTIONAL

## 2023-01-13 DEVICE — SURGIFOAM PAD 8CM X 12.5CM X 2MM (100C): Type: IMPLANTABLE DEVICE | Site: RIGHT | Status: FUNCTIONAL

## 2023-01-13 DEVICE — SURGIFLO MATRIX WITH THROMBIN KIT: Type: IMPLANTABLE DEVICE | Site: RIGHT | Status: FUNCTIONAL

## 2023-01-13 RX ORDER — VANCOMYCIN HCL 1 G
500 VIAL (EA) INTRAVENOUS ONCE
Refills: 0 | Status: DISCONTINUED | OUTPATIENT
Start: 2023-01-13 | End: 2023-01-13

## 2023-01-13 RX ORDER — ONDANSETRON 8 MG/1
4 TABLET, FILM COATED ORAL ONCE
Refills: 0 | Status: DISCONTINUED | OUTPATIENT
Start: 2023-01-13 | End: 2023-01-13

## 2023-01-13 RX ORDER — ASPIRIN/CALCIUM CARB/MAGNESIUM 324 MG
81 TABLET ORAL DAILY
Refills: 0 | Status: DISCONTINUED | OUTPATIENT
Start: 2023-01-13 | End: 2023-02-24

## 2023-01-13 RX ORDER — HYDROMORPHONE HYDROCHLORIDE 2 MG/ML
0.5 INJECTION INTRAMUSCULAR; INTRAVENOUS; SUBCUTANEOUS
Refills: 0 | Status: DISCONTINUED | OUTPATIENT
Start: 2023-01-13 | End: 2023-01-13

## 2023-01-13 RX ORDER — SODIUM CHLORIDE 9 MG/ML
1000 INJECTION, SOLUTION INTRAVENOUS
Refills: 0 | Status: DISCONTINUED | OUTPATIENT
Start: 2023-01-13 | End: 2023-01-14

## 2023-01-13 RX ADMIN — OXYCODONE HYDROCHLORIDE 10 MILLIGRAM(S): 5 TABLET ORAL at 23:49

## 2023-01-13 RX ADMIN — OXYCODONE HYDROCHLORIDE 10 MILLIGRAM(S): 5 TABLET ORAL at 22:49

## 2023-01-13 RX ADMIN — INSULIN GLARGINE 5 UNIT(S): 100 INJECTION, SOLUTION SUBCUTANEOUS at 01:05

## 2023-01-13 RX ADMIN — DORZOLAMIDE HYDROCHLORIDE TIMOLOL MALEATE 1 DROP(S): 20; 5 SOLUTION/ DROPS OPHTHALMIC at 06:55

## 2023-01-13 RX ADMIN — MUPIROCIN 1 APPLICATION(S): 20 OINTMENT TOPICAL at 18:34

## 2023-01-13 RX ADMIN — Medication 975 MILLIGRAM(S): at 18:32

## 2023-01-13 RX ADMIN — FAMOTIDINE 20 MILLIGRAM(S): 10 INJECTION INTRAVENOUS at 18:33

## 2023-01-13 RX ADMIN — Medication 975 MILLIGRAM(S): at 02:17

## 2023-01-13 RX ADMIN — MUPIROCIN 1 APPLICATION(S): 20 OINTMENT TOPICAL at 06:55

## 2023-01-13 RX ADMIN — PIPERACILLIN AND TAZOBACTAM 25 GRAM(S): 4; .5 INJECTION, POWDER, LYOPHILIZED, FOR SOLUTION INTRAVENOUS at 02:21

## 2023-01-13 RX ADMIN — Medication 975 MILLIGRAM(S): at 10:52

## 2023-01-13 RX ADMIN — MIDODRINE HYDROCHLORIDE 10 MILLIGRAM(S): 2.5 TABLET ORAL at 18:33

## 2023-01-13 RX ADMIN — Medication 2: at 18:36

## 2023-01-13 RX ADMIN — Medication 4: at 11:20

## 2023-01-13 RX ADMIN — SODIUM CHLORIDE 75 MILLILITER(S): 9 INJECTION, SOLUTION INTRAVENOUS at 18:27

## 2023-01-13 RX ADMIN — Medication 1 TABLET(S): at 18:33

## 2023-01-13 RX ADMIN — PIPERACILLIN AND TAZOBACTAM 25 GRAM(S): 4; .5 INJECTION, POWDER, LYOPHILIZED, FOR SOLUTION INTRAVENOUS at 18:33

## 2023-01-13 RX ADMIN — DORZOLAMIDE HYDROCHLORIDE TIMOLOL MALEATE 1 DROP(S): 20; 5 SOLUTION/ DROPS OPHTHALMIC at 18:34

## 2023-01-13 RX ADMIN — MIDODRINE HYDROCHLORIDE 10 MILLIGRAM(S): 2.5 TABLET ORAL at 11:25

## 2023-01-13 RX ADMIN — CHLORHEXIDINE GLUCONATE 1 APPLICATION(S): 213 SOLUTION TOPICAL at 06:53

## 2023-01-13 RX ADMIN — CHLORHEXIDINE GLUCONATE 1 APPLICATION(S): 213 SOLUTION TOPICAL at 11:23

## 2023-01-13 RX ADMIN — FAMOTIDINE 20 MILLIGRAM(S): 10 INJECTION INTRAVENOUS at 06:54

## 2023-01-13 RX ADMIN — INSULIN GLARGINE 5 UNIT(S): 100 INJECTION, SOLUTION SUBCUTANEOUS at 22:40

## 2023-01-13 NOTE — PROGRESS NOTE ADULT - PROBLEM SELECTOR PLAN 3
Appreciate Nephrology consult.   Continue HD schedule per Nephrology/Vascular (recently MWF).   C/w midodrine as well as epo injections.

## 2023-01-13 NOTE — PROGRESS NOTE ADULT - SUBJECTIVE AND OBJECTIVE BOX
Patient seen and examined at bedside.    Overnight Events: No acute events overnight. Denies chest pain or SOB      REVIEW OF SYSTEMS:  Constitutional:     [ x] negative [ ] fevers [ ] chills [ ] weight loss [ ] weight gain  HEENT:                  [ x] negative [ ] dry eyes [ ] eye irritation [ ] postnasal drip [ ] nasal congestion  CV:                         [x ] negative  [ ] chest pain [ ] orthopnea [ ] palpitations [ ] murmur  Resp:                     [ x] negative [ ] cough [ ] shortness of breath [ ] dyspnea [ ] wheezing [ ] sputum [ ]hemoptysis  GI:                          [ x] negative [ ] nausea [ ] vomiting [ ] diarrhea [ ] constipation [ ] abd pain [ ] dysphagia   :                        [ x] negative [ ] dysuria [ ] nocturia [ ] hematuria [ ] increased urinary frequency  Musculoskeletal: [ x] negative [ ] back pain [ ] myalgias [ ] arthralgias [ ] fracture  Skin:                       [ x] negative [ ] rash [ ] itch  Neurological:        [ x] negative [ ] headache [ ] dizziness [ ] syncope [ ] weakness [ ] numbness  Psychiatric:           [ x] negative [ ] anxiety [ ] depression  Endocrine:            [ x] negative [ ] diabetes [ ] thyroid problem  Heme/Lymph:      [ x] negative [ ] anemia [ ] bleeding problem  Allergic/Immune: [x ] negative [ ] itchy eyes [ ] nasal discharge [ ] hives [ ] angioedema    [x ] All other systems negative  [ ] Unable to assess ROS due to    Current Meds:  acetaminophen     Tablet .. 975 milliGRAM(s) Oral every 8 hours  aluminum hydroxide/magnesium hydroxide/simethicone Suspension 30 milliLiter(s) Oral four times a day PRN  bisacodyl Suppository 10 milliGRAM(s) Rectal daily PRN  chlorhexidine 2% Cloths 1 Application(s) Topical <User Schedule>  chlorhexidine 2% Cloths 1 Application(s) Topical daily  dextrose 5%. 1000 milliLiter(s) IV Continuous <Continuous>  dextrose 5%. 1000 milliLiter(s) IV Continuous <Continuous>  dextrose 5%. 1000 milliLiter(s) IV Continuous <Continuous>  dextrose 5%. 1000 milliLiter(s) IV Continuous <Continuous>  dextrose 50% Injectable 25 Gram(s) IV Push once  dextrose 50% Injectable 12.5 Gram(s) IV Push once  dextrose 50% Injectable 25 Gram(s) IV Push once  dextrose 50% Injectable 25 Gram(s) IV Push once  dextrose 50% Injectable 12.5 Gram(s) IV Push once  dextrose 50% Injectable 25 Gram(s) IV Push once  dextrose Oral Gel 15 Gram(s) Oral once PRN  dextrose Oral Gel 15 Gram(s) Oral once PRN  dorzolamide 2%/timolol 0.5% Ophthalmic Solution 1 Drop(s) Left EYE two times a day  epoetin deidre-epbx (RETACRIT) Injectable 67073 Unit(s) IV Push <User Schedule>  famotidine    Tablet 20 milliGRAM(s) Oral every 12 hours  glucagon  Injectable 1 milliGRAM(s) IntraMuscular once  glucagon  Injectable 1 milliGRAM(s) IntraMuscular once  insulin glargine Injectable (LANTUS) 5 Unit(s) SubCutaneous at bedtime  insulin lispro (ADMELOG) corrective regimen sliding scale   SubCutaneous three times a day before meals  magnesium hydroxide Suspension 30 milliLiter(s) Oral daily PRN  midodrine. 10 milliGRAM(s) Oral three times a day  multivitamin 1 Tablet(s) Oral daily  mupirocin 2% Ointment 1 Application(s) Both Nostrils two times a day  oxyCODONE    IR 10 milliGRAM(s) Oral every 4 hours PRN  piperacillin/tazobactam IVPB.. 3.375 Gram(s) IV Intermittent every 12 hours      PAST MEDICAL & SURGICAL HISTORY:  ESRD on dialysis      H/O hypotension      Diabetes mellitus      S/P BKA (below knee amputation) bilateral      AV fistula          Vitals:  T(F): 97.7 (01-13), Max: 99.4 (01-12)  HR: 81 (01-13) (72 - 98)  BP: 131/60 (01-13) (102/50 - 131/66)  RR: 18 (01-13)  SpO2: 98% (01-13)  I&O's Summary    12 Jan 2023 07:01  -  13 Jan 2023 07:00  --------------------------------------------------------  IN: 400 mL / OUT: 2000 mL / NET: -1600 mL        Physical Exam:  GENERAL: No acute distress, well-developed  HEAD:  Atraumatic, Normocephalic  ENT: EOMI, PERRLA, conjunctiva and sclera clear, Neck supple, No JVD, moist mucosa  CHEST/LUNG: Clear to auscultation bilaterally; No wheeze, equal breath sounds bilaterally   BACK: No spinal tenderness  HEART: Regular rate and rhythm; + systolic  murmurs  ABDOMEN: Soft, Nontender, Nondistended; Bowel sounds present  EXTREMITIES:  bilat BKA  PSYCH: Nl behavior, nl affect  NEUROLOGY: AAOx3, non-focal, cranial nerves intact  SKIN: Normal color, No rashes or lesions                          8.9    16.78 )-----------( 510      ( 13 Jan 2023 00:15 )             28.8     01-13    133<L>  |  94<L>  |  16  ----------------------------<  210<H>  3.8   |  28  |  4.62<H>    Ca    9.1      13 Jan 2023 00:15  Phos  2.0     01-13  Mg     2.10     01-13      PT/INR - ( 13 Jan 2023 00:15 )   PT: 15.2 sec;   INR: 1.31 ratio         PTT - ( 13 Jan 2023 00:15 )  PTT:27.3 sec

## 2023-01-13 NOTE — PROVIDER CONTACT NOTE (OTHER) - ACTION/TREATMENT ORDERED:
Nephrologist is aware and Patient is alert and oriented x 4, MD is okay with patient's request. Read back verified and confirmed.

## 2023-01-13 NOTE — PROGRESS NOTE ADULT - SUBJECTIVE AND OBJECTIVE BOX
Surgery Progress Note    Subjective:     Patient seen and examined at bedside. VSS, AF. To OR today. PPG and duplex done o/n. Cards consulted. Echo not completed, called for expediting this AM    OBJECTIVE:     T(C): 36.5 (01-13-23 @ 07:09), Max: 37.4 (01-12-23 @ 17:48)  HR: 81 (01-13-23 @ 07:09) (72 - 98)  BP: 131/60 (01-13-23 @ 07:09) (102/50 - 131/66)  RR: 18 (01-13-23 @ 07:09) (17 - 18)  SpO2: 98% (01-13-23 @ 07:09) (97% - 100%)  Wt(kg): --    I&O's Detail    12 Jan 2023 07:01  -  13 Jan 2023 07:00  --------------------------------------------------------  IN:    Other (mL): 400 mL  Total IN: 400 mL    OUT:    Other (mL): 2000 mL  Total OUT: 2000 mL    Total NET: -1600 mL          PHYSICAL EXAM:    General- Younger man. In distress from pain  Extremities- RUE with scar from above antecubital fossa to axilla, well healed. Brachial thrill palpable, radial artery with weak dopplerable signal, ulnar artery with strongly dopplerable signal. Hand significantly swollen, with missing third digit and dry gangrene of second finger. Volar incision noted w/ non absorbable suture. Fistula without ulceration or aneursymal change. Arm wrapped in ACE  Lungs: Comfortable on room air    MEDICATIONS  (STANDING):  acetaminophen     Tablet .. 975 milliGRAM(s) Oral every 8 hours  chlorhexidine 2% Cloths 1 Application(s) Topical <User Schedule>  chlorhexidine 2% Cloths 1 Application(s) Topical daily  dextrose 5%. 1000 milliLiter(s) (100 mL/Hr) IV Continuous <Continuous>  dextrose 5%. 1000 milliLiter(s) (50 mL/Hr) IV Continuous <Continuous>  dextrose 5%. 1000 milliLiter(s) (100 mL/Hr) IV Continuous <Continuous>  dextrose 5%. 1000 milliLiter(s) (50 mL/Hr) IV Continuous <Continuous>  dextrose 50% Injectable 25 Gram(s) IV Push once  dextrose 50% Injectable 12.5 Gram(s) IV Push once  dextrose 50% Injectable 25 Gram(s) IV Push once  dextrose 50% Injectable 25 Gram(s) IV Push once  dextrose 50% Injectable 12.5 Gram(s) IV Push once  dextrose 50% Injectable 25 Gram(s) IV Push once  dorzolamide 2%/timolol 0.5% Ophthalmic Solution 1 Drop(s) Left EYE two times a day  epoetin deidre-epbx (RETACRIT) Injectable 52982 Unit(s) IV Push <User Schedule>  famotidine    Tablet 20 milliGRAM(s) Oral every 12 hours  glucagon  Injectable 1 milliGRAM(s) IntraMuscular once  glucagon  Injectable 1 milliGRAM(s) IntraMuscular once  insulin glargine Injectable (LANTUS) 5 Unit(s) SubCutaneous at bedtime  insulin lispro (ADMELOG) corrective regimen sliding scale   SubCutaneous three times a day before meals  midodrine. 10 milliGRAM(s) Oral three times a day  multivitamin 1 Tablet(s) Oral daily  mupirocin 2% Ointment 1 Application(s) Both Nostrils two times a day  piperacillin/tazobactam IVPB.. 3.375 Gram(s) IV Intermittent every 12 hours    MEDICATIONS  (PRN):  aluminum hydroxide/magnesium hydroxide/simethicone Suspension 30 milliLiter(s) Oral four times a day PRN Indigestion  bisacodyl Suppository 10 milliGRAM(s) Rectal daily PRN If no bowel movement  dextrose Oral Gel 15 Gram(s) Oral once PRN Blood Glucose LESS THAN 70 milliGRAM(s)/deciliter  dextrose Oral Gel 15 Gram(s) Oral once PRN Blood Glucose LESS THAN 70 milliGRAM(s)/deciliter  magnesium hydroxide Suspension 30 milliLiter(s) Oral daily PRN Constipation  oxyCODONE    IR 10 milliGRAM(s) Oral every 4 hours PRN Severe Pain (7 - 10)      LABS:                          8.9    16.78 )-----------( 510      ( 13 Jan 2023 00:15 )             28.8     01-13    133<L>  |  91<L>  |  14  ----------------------------<  214<H>  3.6   |  28  |  4.53<H>    Ca    9.0      13 Jan 2023 06:12  Phos  2.0     01-13  Mg     2.10     01-13      PT/INR - ( 13 Jan 2023 00:15 )   PT: 15.2 sec;   INR: 1.31 ratio         PTT - ( 13 Jan 2023 00:15 )  PTT:27.3 sec

## 2023-01-13 NOTE — PROGRESS NOTE ADULT - ASSESSMENT
Patient is a 48 y/o male PMH DM2, Bilateral BKA, ESRD (on HD MWF), last dialyzed yesterday transferred from [facility] emergently for evaluation of evaluation of right finger swelling/pain. Patient reports history of right finger pain after he had a fall one year ago. Patient had a wound on her second/middle finger who he was seeing a hand surgeon for outpatient but unable ultimately to continue seeing due to insurance issues. Patient reports his middle finger developed increased swelling and became black in color about two weeks ago. Denies fevers. Patient transferred to [facility] for further evaluation and emergent OR. Patient received broad spectrum Abx of Zosyn/vanco/clindamycin at [place]. Interpretor phone used for translation with patient. ID# [id number]         septic workup and ID evaluation,send blood and urine cx,serial lactate levels,monitor vitals closley,ivfs hydration,monitor urine output and renal profile,iv abx as per id cons    CHRONIC KIDNEY DISEASE, STAGE 5:   Serum creatinine is stable at 5.2 , approximating a GFR of *** ml/min.   There is no progression.  No uremic symptoms. No evidence of  worsening  Anemia. Fluid status stable.   Will continue to avoid nephrotoxic drugs.  Patient remains asymptomatic.  Continue current therapy.    BP monitoring,continue current antihypertensive meds, low salt diet,followup with PMD in 1-2 weeks      ANEMIA PLAN:  Anemia of chronic disease:  Well controlled by epo  H and H subtherapeutic .  We will continue epo aiming for a HCT of 32-36 %.   We will monitor Iron stores, B12 and RBC folate .  epoetin deidre-epbx (RETACRIT) Injectable 09151 Unit(s) IV Push     Accuchecks monitoring and insulin sliding scale coverage, no concentrated sweets diet, serial labs and f/up with PMD, monitor HB A 1 C every 3-4 mnth

## 2023-01-13 NOTE — CHART NOTE - NSCHARTNOTEFT_GEN_A_CORE
Patient away for vascular surgery.   Chart reviewed.   I don't think he needs more Vanc. MRSA PCR has a poor positive predictive value and OR cultures are growing other things.   Would keep on Zosyn alone through the weekend.   Covering for soft tissue infection.   Course depends on recovery after RTOR tomorrow, could be as short as 2-3 days post op.     Discussed with marybeth Alvarez MD   Infectious Disease   Available on TEAMS. After 5PM and on weekends please page fellow on call or call [phone]

## 2023-01-13 NOTE — PROGRESS NOTE ADULT - SUBJECTIVE AND OBJECTIVE BOX
Orthopaedic Surgery Progress Note    Subjective:   Patient was seen and examined at bedside. Reports pain in wrist has increased slightly. No acute events overnight     Vital Signs Last 24 Hrs  T(C): 36.6 (13 Jan 2023 02:39), Max: 37.4 (12 Jan 2023 17:48)  T(F): 97.8 (13 Jan 2023 02:39), Max: 99.4 (12 Jan 2023 17:48)  HR: 80 (13 Jan 2023 02:39) (72 - 98)  BP: 131/58 (13 Jan 2023 02:39) (102/50 - 131/66)  BP(mean): --  RR: 18 (13 Jan 2023 02:39) (17 - 18)  SpO2: 100% (13 Jan 2023 02:39) (97% - 100%)    Parameters below as of 13 Jan 2023 02:39  Patient On (Oxygen Delivery Method): room air        Physical Exam:  Gen: NAD  Resp: Nonlabored  R UE:    Dressing changed  open site distally without purulence or bleeding  incisions c/d/i   tenderness to palpation over other finger tips (chronic deformity to tip of thumb and long finger)  tenderness to palpation over wrist, slight erythema  Rest of hand WWP                                              8.9    16.78 )-----------( 510      ( 13 Jan 2023 00:15 )             28.8     01-13    133<L>  |  94<L>  |  16  ----------------------------<  210<H>  3.8   |  28  |  4.62<H>    Ca    9.1      13 Jan 2023 00:15  Phos  2.0     01-13  Mg     2.10     01-13

## 2023-01-13 NOTE — PROGRESS NOTE ADULT - ASSESSMENT
48 y/o M p/w possible steal syndrome from fistula.    -To OR today tentatively for fistulagram, TANJA revision of fistula  -Please expedite ECHO  -Appreciate PPG, Duplex  -Appreciate Cards recs  -Rest of care per primary    D/w Vascular Surgery fellow on call

## 2023-01-13 NOTE — PROGRESS NOTE ADULT - SUBJECTIVE AND OBJECTIVE BOX
Patient is a 47y Male whom presented to the hospital with esrd on hd     PAST MEDICAL & SURGICAL HISTORY:      MEDICATIONS  (STANDING):  dextrose 5%. 1000 milliLiter(s) (100 mL/Hr) IV Continuous <Continuous>  dextrose 5%. 1000 milliLiter(s) (50 mL/Hr) IV Continuous <Continuous>  dextrose 50% Injectable 25 Gram(s) IV Push once  dextrose 50% Injectable 25 Gram(s) IV Push once  dextrose 50% Injectable 12.5 Gram(s) IV Push once  glucagon  Injectable 1 milliGRAM(s) IntraMuscular once  insulin lispro (ADMELOG) corrective regimen sliding scale   SubCutaneous every 6 hours  pantoprazole    Tablet 40 milliGRAM(s) Oral daily  polyethylene glycol 3350 17 Gram(s) Oral daily  senna 2 Tablet(s) Oral at bedtime      Allergies    No Known Allergies    Intolerances        SOCIAL HISTORY:  Denies ETOh,Smoking,     FAMILY HISTORY:      REVIEW OF SYSTEMS:    CONSTITUTIONAL: No weakness, fevers or chills  NECK: No pain or stiffness  RESPIRATORY: No cough, wheezing, hemoptysis; No shortness of breath  CARDIOVASCULAR: No chest pain or palpitations  GASTROINTESTINAL: No abdominal or epigastric pain. No nausea, vomiting,                                                   8.9    16.78 )-----------( 510      ( 13 Jan 2023 00:15 )             28.8       CBC Full  -  ( 13 Jan 2023 00:15 )  WBC Count : 16.78 K/uL  RBC Count : 3.09 M/uL  Hemoglobin : 8.9 g/dL  Hematocrit : 28.8 %  Platelet Count - Automated : 510 K/uL  Mean Cell Volume : 93.2 fL  Mean Cell Hemoglobin : 28.8 pg  Mean Cell Hemoglobin Concentration : 30.9 gm/dL  Auto Neutrophil # : x  Auto Lymphocyte # : x  Auto Monocyte # : x  Auto Eosinophil # : x  Auto Basophil # : x  Auto Neutrophil % : x  Auto Lymphocyte % : x  Auto Monocyte % : x  Auto Eosinophil % : x  Auto Basophil % : x      01-13    133<L>  |  91<L>  |  14  ----------------------------<  214<H>  3.6   |  28  |  4.53<H>    Ca    9.0      13 Jan 2023 06:12  Phos  2.0     01-13  Mg     2.10     01-13        CAPILLARY BLOOD GLUCOSE      POCT Blood Glucose.: 165 mg/dL (13 Jan 2023 16:24)  POCT Blood Glucose.: 227 mg/dL (13 Jan 2023 11:17)  POCT Blood Glucose.: 208 mg/dL (13 Jan 2023 07:16)  POCT Blood Glucose.: 188 mg/dL (13 Jan 2023 02:24)  POCT Blood Glucose.: 194 mg/dL (13 Jan 2023 00:55)  POCT Blood Glucose.: 224 mg/dL (12 Jan 2023 22:36)      Vital Signs Last 24 Hrs  T(C): 36 (13 Jan 2023 16:10), Max: 37.4 (13 Jan 2023 01:47)  T(F): 96.8 (13 Jan 2023 16:10), Max: 99.4 (13 Jan 2023 01:47)  HR: 62 (13 Jan 2023 17:45) (60 - 81)  BP: 89/49 (13 Jan 2023 17:45) (89/49 - 135/57)  BP(mean): 59 (13 Jan 2023 17:45) (47 - 72)  RR: 13 (13 Jan 2023 17:45) (10 - 18)  SpO2: 98% (13 Jan 2023 17:45) (98% - 100%)    Parameters below as of 13 Jan 2023 16:10  Patient On (Oxygen Delivery Method): nasal cannula  O2 Flow (L/min): 2          PT/INR - ( 13 Jan 2023 00:15 )   PT: 15.2 sec;   INR: 1.31 ratio         PTT - ( 13 Jan 2023 00:15 )  PTT:27.3 sec        PHYSICAL EXAM:    Constitutional: NAD  HEENT: conjunctive   clear   Neck:  No JVD  Respiratory: CTAB  Cardiovascular: S1 and S2  Gastrointestinal: BS+, soft, NT/ND

## 2023-01-13 NOTE — PROGRESS NOTE ADULT - NUTRITIONAL ASSESSMENT
MEDICATIONS  (STANDING):  acetaminophen     Tablet .. 975 milliGRAM(s) Oral every 8 hours  aspirin  chewable 81 milliGRAM(s) Oral daily  chlorhexidine 2% Cloths 1 Application(s) Topical <User Schedule>  chlorhexidine 2% Cloths 1 Application(s) Topical daily  dextrose 5%. 1000 milliLiter(s) (100 mL/Hr) IV Continuous <Continuous>  dextrose 5%. 1000 milliLiter(s) (50 mL/Hr) IV Continuous <Continuous>  dextrose 5%. 1000 milliLiter(s) (100 mL/Hr) IV Continuous <Continuous>  dextrose 5%. 1000 milliLiter(s) (50 mL/Hr) IV Continuous <Continuous>  dextrose 50% Injectable 25 Gram(s) IV Push once  dextrose 50% Injectable 12.5 Gram(s) IV Push once  dextrose 50% Injectable 25 Gram(s) IV Push once  dextrose 50% Injectable 25 Gram(s) IV Push once  dextrose 50% Injectable 12.5 Gram(s) IV Push once  dextrose 50% Injectable 25 Gram(s) IV Push once  dorzolamide 2%/timolol 0.5% Ophthalmic Solution 1 Drop(s) Left EYE two times a day  epoetin deidre-epbx (RETACRIT) Injectable 42329 Unit(s) IV Push <User Schedule>  famotidine    Tablet 20 milliGRAM(s) Oral every 12 hours  glucagon  Injectable 1 milliGRAM(s) IntraMuscular once  glucagon  Injectable 1 milliGRAM(s) IntraMuscular once  insulin glargine Injectable (LANTUS) 5 Unit(s) SubCutaneous at bedtime  insulin lispro (ADMELOG) corrective regimen sliding scale   SubCutaneous three times a day before meals  lactated ringers. 1000 milliLiter(s) (75 mL/Hr) IV Continuous <Continuous>  midodrine. 10 milliGRAM(s) Oral three times a day  multivitamin 1 Tablet(s) Oral daily  mupirocin 2% Ointment 1 Application(s) Both Nostrils two times a day  piperacillin/tazobactam IVPB.. 3.375 Gram(s) IV Intermittent every 12 hours

## 2023-01-13 NOTE — PROGRESS NOTE ADULT - ASSESSMENT
46 y/o male PMH DM2, Bilateral BKA, ESRD (on HD MWF), pending fistulogram with Vacular surgery tomorrow    -No recent Change in clinical status, poor functional status at baseline, at least in part 2/2 physical limitation in the setting of bilat BKA  -No complaints of Chest pain, shortness of breath, Dyspnea on exertion, palpitations, or syncope  -EKG:  -please obtain TTE:  -Not in ADHF  -No current or ACS within the past 30 days  -c/w aspirin unless the risk of bleeding outweighs the benefits of continuing in the setting of prior PCI.  -Close perioperative monitoring  -Pt is at increased risk for fistulogram procedure. No further cardiovascular testing is required prior to Fistulogram only, however, any additional procedure would require TTE as patient with significant , as far as we know, new murmur on exam.     Nick Gooden, Cardiology Fellow, F-1    For all New Consults and Questions:  [email or web address]   Login: [email or web address]    *** Note not final until signed by attending     46 y/o male PMH DM2, Bilateral BKA, ESRD (on HD MWF), pending fistulogram with Vacular surgery tomorrow    -No recent Change in clinical status, poor functional status at baseline, at least in part 2/2 physical limitation in the setting of bilat BKA  -No complaints of Chest pain, shortness of breath, Dyspnea on exertion, palpitations, or syncope  -EKG:  - TTE: prelim read with grossly nml LV and with pulmonary insufficiency. f/u official read.   -Not in ADHF  -No current or ACS within the past 30 days  -c/w aspirin unless the risk of bleeding outweighs the benefits of continuing in the setting of prior PCI.  -Close perioperative monitoring  -Pt is at increased risk for fistulogram procedure. No further cardiovascular testing is required prior to Fistulogram and possible revision.     Nick Gooden, Cardiology Fellow, F-1    For all New Consults and Questions:  [email or web address]   Login: cardfellows    *** Note not final until signed by attending

## 2023-01-13 NOTE — PROGRESS NOTE ADULT - PROBLEM SELECTOR PLAN 2
Recent A1c 7.1, though this may be an underestimate in setting of anemia related to ESRD. Glucose has been over 200 in the mornings.   - Recommend increasing lantus to 8 units post-op tomorrow night   - Continue correctional admelog and monitor glucose in-house (goal 100-180).

## 2023-01-13 NOTE — PROGRESS NOTE ADULT - ASSESSMENT
A/P: 47y y/o Male s/p Right 3rd finger amputation at metacarpal head, hand I&D and CTR,       - OR today with Vascular for fistulogram FU recs  - OR tomorrow in AM for repeat I&D possible closure  -Pain control/analgesia  -DVT ppx - Venodynes/HSQ  -FU AM Labs  -Non-weight bearing right upper extremity in bulky dressing  -Daily dressing and packing changes  -FU nephrology and HD (MARINOF)  -FU ID recs (vanc, zosyn)  -Appreciate optho recs: outpatient follow-up

## 2023-01-13 NOTE — CHART NOTE - NSCHARTNOTEFT_GEN_A_CORE
Post Operative Note  Patient: MELVI RODRIGUEZ 47y (1975) Male   MRN: [id number]  Location: [facility]  Visit: 01-10-23 Inpatient  Date: 01-13-23 @ 22:30    Procedure: S/P AVF revision    Subjective: Patient seen and examined post operatively. Reports pain as controlled. Denies nausea, vomiting, fever, chills, chest pain, SOB, cough.      Objective:  Vitals: T(F): 97.6 (01-13-23 @ 21:16), Max: 99.4 (01-13-23 @ 01:47)  HR: 69 (01-13-23 @ 21:16)  BP: 136/61 (01-13-23 @ 21:16) (89/49 - 136/61)  RR: 18 (01-13-23 @ 21:16)  SpO2: 100% (01-13-23 @ 21:16)  Vent Settings:     In:   01-12-23 @ 07:01  -  01-13-23 @ 07:00  --------------------------------------------------------  IN: 400 mL    01-13-23 @ 07:01  -  01-13-23 @ 22:30  --------------------------------------------------------  IN: 75 mL      IV Fluids: dextrose 5%. 1000 milliLiter(s) (100 mL/Hr) IV Continuous <Continuous>  dextrose 5%. 1000 milliLiter(s) (50 mL/Hr) IV Continuous <Continuous>  dextrose 5%. 1000 milliLiter(s) (100 mL/Hr) IV Continuous <Continuous>  dextrose 5%. 1000 milliLiter(s) (50 mL/Hr) IV Continuous <Continuous>  lactated ringers. 1000 milliLiter(s) (75 mL/Hr) IV Continuous <Continuous>  multivitamin 1 Tablet(s) Oral daily      Out:   01-12-23 @ 07:01  -  01-13-23 @ 07:00  --------------------------------------------------------  OUT: 2000 mL    01-13-23 @ 07:01 - 01-13-23 @ 22:30  --------------------------------------------------------  OUT: 0 mL      EBL:     Voided Urine:   01-12-23 @ 07:01  -  01-13-23 @ 07:00  --------------------------------------------------------  OUT: 2000 mL    01-13-23 @ 07:01  -  01-13-23 @ 22:30  --------------------------------------------------------  OUT: 0 mL            Physical Examination:  General: NAD, resting comfortably in bed  HEENT: Normocephalic atraumatic  Respiratory: Nonlabored respirations, normal CW expansion.  Cardio: S1S2, regular rate and rhythm.  Abdomen: SNTND  Vascular: extremities are warm and well perfused. Distal radial and ulnar pulses dopplerable. AVF has palpable thrill and incision CDI.     Imaging:  No post-op imaging studies    Assessment:  47yMale patient S/P AVF revision for HD access. Patient stable and recovering well on floor.     Plan:  - ASA 81  - Pain control PRN  - Diet: DM2 diet  - IVF    Date/Time: 01-13-23 @ 22:30

## 2023-01-13 NOTE — PROGRESS NOTE ADULT - SUBJECTIVE AND OBJECTIVE BOX
Patient is a 47y old  Male who presents with a chief complaint of s/p Right 3rd finger amputation at metacarpal head, hand I&D and CTR (10 Sudeep 2023 21:43)    Kendall Hedrick MD   [facility]   Pager [phone]  Reachable on Microsoft Teams     SUBJECTIVE / OVERNIGHT EVENTS:  Patient seen and examined this morning. He states that he is having minimal pain in his hand. Denies any fevers, chills.  Reporting stool is soft, but denies diarrhea.     MEDICATIONS  (STANDING):  acetaminophen     Tablet .. 975 milliGRAM(s) Oral every 8 hours  chlorhexidine 2% Cloths 1 Application(s) Topical <User Schedule>  chlorhexidine 2% Cloths 1 Application(s) Topical daily  dextrose 5%. 1000 milliLiter(s) (100 mL/Hr) IV Continuous <Continuous>  dextrose 5%. 1000 milliLiter(s) (50 mL/Hr) IV Continuous <Continuous>  dextrose 5%. 1000 milliLiter(s) (100 mL/Hr) IV Continuous <Continuous>  dextrose 5%. 1000 milliLiter(s) (50 mL/Hr) IV Continuous <Continuous>  dextrose 50% Injectable 25 Gram(s) IV Push once  dextrose 50% Injectable 12.5 Gram(s) IV Push once  dextrose 50% Injectable 25 Gram(s) IV Push once  dextrose 50% Injectable 25 Gram(s) IV Push once  dextrose 50% Injectable 12.5 Gram(s) IV Push once  dextrose 50% Injectable 25 Gram(s) IV Push once  dorzolamide 2%/timolol 0.5% Ophthalmic Solution 1 Drop(s) Left EYE two times a day  epoetin deidre-epbx (RETACRIT) Injectable 24917 Unit(s) IV Push <User Schedule>  famotidine    Tablet 20 milliGRAM(s) Oral every 12 hours  glucagon  Injectable 1 milliGRAM(s) IntraMuscular once  glucagon  Injectable 1 milliGRAM(s) IntraMuscular once  insulin glargine Injectable (LANTUS) 5 Unit(s) SubCutaneous at bedtime  insulin lispro (ADMELOG) corrective regimen sliding scale   SubCutaneous three times a day before meals  midodrine. 10 milliGRAM(s) Oral three times a day  multivitamin 1 Tablet(s) Oral daily  mupirocin 2% Ointment 1 Application(s) Both Nostrils two times a day  piperacillin/tazobactam IVPB.. 3.375 Gram(s) IV Intermittent every 12 hours  vancomycin  IVPB 500 milliGRAM(s) IV Intermittent once    MEDICATIONS  (PRN):  aluminum hydroxide/magnesium hydroxide/simethicone Suspension 30 milliLiter(s) Oral four times a day PRN Indigestion  bisacodyl Suppository 10 milliGRAM(s) Rectal daily PRN If no bowel movement  dextrose Oral Gel 15 Gram(s) Oral once PRN Blood Glucose LESS THAN 70 milliGRAM(s)/deciliter  dextrose Oral Gel 15 Gram(s) Oral once PRN Blood Glucose LESS THAN 70 milliGRAM(s)/deciliter  magnesium hydroxide Suspension 30 milliLiter(s) Oral daily PRN Constipation  oxyCODONE    IR 10 milliGRAM(s) Oral every 4 hours PRN Severe Pain (7 - 10)      Vital Signs Last 24 Hrs  T(C): 36.3 (13 Jan 2023 11:57), Max: 37.4 (12 Jan 2023 17:48)  T(F): 97.4 (13 Jan 2023 11:57), Max: 99.4 (12 Jan 2023 17:48)  HR: 74 (13 Jan 2023 11:57) (72 - 98)  BP: 111/57 (13 Jan 2023 11:57) (102/50 - 131/66)  BP(mean): --  RR: 14 (13 Jan 2023 11:57) (14 - 18)  SpO2: 100% (13 Jan 2023 11:57) (97% - 100%)  CAPILLARY BLOOD GLUCOSE      POCT Blood Glucose.: 227 mg/dL (13 Jan 2023 11:17)  POCT Blood Glucose.: 208 mg/dL (13 Jan 2023 07:16)  POCT Blood Glucose.: 188 mg/dL (13 Jan 2023 02:24)  POCT Blood Glucose.: 194 mg/dL (13 Jan 2023 00:55)  POCT Blood Glucose.: 224 mg/dL (12 Jan 2023 22:36)  POCT Blood Glucose.: 220 mg/dL (12 Jan 2023 16:48)    I&O's Summary    12 Jan 2023 07:01  -  13 Jan 2023 07:00  --------------------------------------------------------  IN: 400 mL / OUT: 2000 mL / NET: -1600 mL        General: NAD, awake and alert  HENMT: NCAT, MMM  Neck: Supple, trachea midline   Respiratory: No respiratory distress, CTABL, No rales, rhonchi, wheezing.  Cardiovascular: S1,S2; Regular rate and rhythm; No apprecible murmurs.   Gastrointestinal: Soft, Nontender, Nondistended; +BS.   Extremities: right hand wrapped, bandages clean. RUE AVF with thrill.  No c/c/e; bilateral BKAs  Neurological: Moving all 4 extremities; Sensation to LT grossly in tact in BLEs.  Skin: No rashes, No erythema   Psych: appropriate mood and affect    LABS:                        8.9    16.78 )-----------( 510      ( 13 Jan 2023 00:15 )             28.8     01-13    133<L>  |  91<L>  |  14  ----------------------------<  214<H>  3.6   |  28  |  4.53<H>    Ca    9.0      13 Jan 2023 06:12  Phos  2.0     01-13  Mg     2.10     01-13      PT/INR - ( 13 Jan 2023 00:15 )   PT: 15.2 sec;   INR: 1.31 ratio         PTT - ( 13 Jan 2023 00:15 )  PTT:27.3 sec          RADIOLOGY & ADDITIONAL TESTS:    Imaging Personally Reviewed:    Consultant(s) Notes Reviewed:      Care Discussed with Consultants/Other Providers:

## 2023-01-13 NOTE — PROGRESS NOTE ADULT - PROBLEM SELECTOR PLAN 1
# Sepsis secondary to RUE 3rd digit gangrene, POA:  - Febrile to 38.1, WBC 14 - sepsis resolved   - s/p Right 3rd finger amputation at metacarpal head  - Appreciate ID recommendations. Agree with IV zosyn and vancomycin by level for continued coverage of hand soft tissue infection.   - please repeat level today   - f/u OR cultures, prelim with E. coli    # RT hand steal syndrome   - Vascular consult appreciated, planning for RT arm angiogram today to improve flow to hand     #SUSI: Pt anticipated to RTOR for repeat I&D. RCRI = 2 (Class III) connoting 10.1% 30-day risk of major adverse cardiac event. Pt is without acute cardiac condition and tolerated first I&D. As workup is unlikely to , pt may RTOR for repeat I&D without further cardiac testing.

## 2023-01-13 NOTE — PRE-OP CHECKLIST - 1.
see pacu flowsheet for vs upon arival to the asu right avf right hand dsg and ace wrap pt has Dale BECERRAA see pacu flowsheet for vs upon arival to the [facility]u.  right avf right hand dsg and ace wrap pt has Dale RUSSELL

## 2023-01-14 ENCOUNTER — RESULT REVIEW (OUTPATIENT)
Age: 48
End: 2023-01-14

## 2023-01-14 LAB
ANION GAP SERPL CALC-SCNC: 13 MMOL/L — SIGNIFICANT CHANGE UP (ref 7–14)
ANION GAP SERPL CALC-SCNC: 17 MMOL/L — HIGH (ref 7–14)
APTT BLD: 29.3 SEC — SIGNIFICANT CHANGE UP (ref 27–36.3)
BUN SERPL-MCNC: 23 MG/DL — SIGNIFICANT CHANGE UP (ref 7–23)
BUN SERPL-MCNC: 26 MG/DL — HIGH (ref 7–23)
CALCIUM SERPL-MCNC: 8.9 MG/DL — SIGNIFICANT CHANGE UP (ref 8.4–10.5)
CALCIUM SERPL-MCNC: 9.1 MG/DL — SIGNIFICANT CHANGE UP (ref 8.4–10.5)
CHLORIDE SERPL-SCNC: 93 MMOL/L — LOW (ref 98–107)
CHLORIDE SERPL-SCNC: 94 MMOL/L — LOW (ref 98–107)
CO2 SERPL-SCNC: 23 MMOL/L — SIGNIFICANT CHANGE UP (ref 22–31)
CO2 SERPL-SCNC: 26 MMOL/L — SIGNIFICANT CHANGE UP (ref 22–31)
CREAT SERPL-MCNC: 6.36 MG/DL — HIGH (ref 0.5–1.3)
CREAT SERPL-MCNC: 6.65 MG/DL — HIGH (ref 0.5–1.3)
EGFR: 10 ML/MIN/1.73M2 — LOW
EGFR: 10 ML/MIN/1.73M2 — LOW
GLUCOSE BLDC GLUCOMTR-MCNC: 127 MG/DL — HIGH (ref 70–99)
GLUCOSE BLDC GLUCOMTR-MCNC: 152 MG/DL — HIGH (ref 70–99)
GLUCOSE BLDC GLUCOMTR-MCNC: 162 MG/DL — HIGH (ref 70–99)
GLUCOSE BLDC GLUCOMTR-MCNC: 168 MG/DL — HIGH (ref 70–99)
GLUCOSE BLDC GLUCOMTR-MCNC: 203 MG/DL — HIGH (ref 70–99)
GLUCOSE SERPL-MCNC: 153 MG/DL — HIGH (ref 70–99)
GLUCOSE SERPL-MCNC: 203 MG/DL — HIGH (ref 70–99)
HCT VFR BLD CALC: 27.6 % — LOW (ref 39–50)
HCT VFR BLD CALC: 28.7 % — LOW (ref 39–50)
HGB BLD-MCNC: 8.5 G/DL — LOW (ref 13–17)
HGB BLD-MCNC: 9 G/DL — LOW (ref 13–17)
INR BLD: 1.2 RATIO — HIGH (ref 0.88–1.16)
MAGNESIUM SERPL-MCNC: 2.1 MG/DL — SIGNIFICANT CHANGE UP (ref 1.6–2.6)
MCHC RBC-ENTMCNC: 29.3 PG — SIGNIFICANT CHANGE UP (ref 27–34)
MCHC RBC-ENTMCNC: 29.3 PG — SIGNIFICANT CHANGE UP (ref 27–34)
MCHC RBC-ENTMCNC: 30.8 GM/DL — LOW (ref 32–36)
MCHC RBC-ENTMCNC: 31.4 GM/DL — LOW (ref 32–36)
MCV RBC AUTO: 93.5 FL — SIGNIFICANT CHANGE UP (ref 80–100)
MCV RBC AUTO: 95.2 FL — SIGNIFICANT CHANGE UP (ref 80–100)
NRBC # BLD: 0 /100 WBCS — SIGNIFICANT CHANGE UP (ref 0–0)
NRBC # BLD: 0 /100 WBCS — SIGNIFICANT CHANGE UP (ref 0–0)
NRBC # FLD: 0 K/UL — SIGNIFICANT CHANGE UP (ref 0–0)
NRBC # FLD: 0.02 K/UL — HIGH (ref 0–0)
PHOSPHATE SERPL-MCNC: 3.4 MG/DL — SIGNIFICANT CHANGE UP (ref 2.5–4.5)
PLATELET # BLD AUTO: 441 K/UL — HIGH (ref 150–400)
PLATELET # BLD AUTO: 479 K/UL — HIGH (ref 150–400)
POTASSIUM SERPL-MCNC: 4.2 MMOL/L — SIGNIFICANT CHANGE UP (ref 3.5–5.3)
POTASSIUM SERPL-MCNC: 4.3 MMOL/L — SIGNIFICANT CHANGE UP (ref 3.5–5.3)
POTASSIUM SERPL-SCNC: 4.2 MMOL/L — SIGNIFICANT CHANGE UP (ref 3.5–5.3)
POTASSIUM SERPL-SCNC: 4.3 MMOL/L — SIGNIFICANT CHANGE UP (ref 3.5–5.3)
PROTHROM AB SERPL-ACNC: 14 SEC — HIGH (ref 10.5–13.4)
RBC # BLD: 2.9 M/UL — LOW (ref 4.2–5.8)
RBC # BLD: 3.07 M/UL — LOW (ref 4.2–5.8)
RBC # FLD: 14.7 % — HIGH (ref 10.3–14.5)
RBC # FLD: 14.9 % — HIGH (ref 10.3–14.5)
SODIUM SERPL-SCNC: 133 MMOL/L — LOW (ref 135–145)
SODIUM SERPL-SCNC: 133 MMOL/L — LOW (ref 135–145)
VANCOMYCIN FLD-MCNC: 9.1 UG/ML — SIGNIFICANT CHANGE UP
WBC # BLD: 12.56 K/UL — HIGH (ref 3.8–10.5)
WBC # BLD: 13.66 K/UL — HIGH (ref 3.8–10.5)
WBC # FLD AUTO: 12.56 K/UL — HIGH (ref 3.8–10.5)
WBC # FLD AUTO: 13.66 K/UL — HIGH (ref 3.8–10.5)

## 2023-01-14 PROCEDURE — 99233 SBSQ HOSP IP/OBS HIGH 50: CPT

## 2023-01-14 PROCEDURE — 25115 REMOVE WRIST/FOREARM LESION: CPT | Mod: 58,RT

## 2023-01-14 PROCEDURE — 26180 REMOVAL OF FINGER TENDON: CPT | Mod: 58,RT

## 2023-01-14 PROCEDURE — 26030 DRAINAGE OF PALM BURSAS: CPT | Mod: 58,RT

## 2023-01-14 PROCEDURE — 25118 EXCISE WRIST TENDON SHEATH: CPT | Mod: 58,RT

## 2023-01-14 PROCEDURE — 26910 AMPUTATE METACARPAL BONE: CPT | Mod: 58,RT

## 2023-01-14 PROCEDURE — 88304 TISSUE EXAM BY PATHOLOGIST: CPT | Mod: 26

## 2023-01-14 RX ORDER — HYDROMORPHONE HYDROCHLORIDE 2 MG/ML
1 INJECTION INTRAMUSCULAR; INTRAVENOUS; SUBCUTANEOUS ONCE
Refills: 0 | Status: DISCONTINUED | OUTPATIENT
Start: 2023-01-14 | End: 2023-01-14

## 2023-01-14 RX ORDER — INSULIN GLARGINE 100 [IU]/ML
8 INJECTION, SOLUTION SUBCUTANEOUS AT BEDTIME
Refills: 0 | Status: DISCONTINUED | OUTPATIENT
Start: 2023-01-14 | End: 2023-01-17

## 2023-01-14 RX ORDER — VANCOMYCIN HCL 1 G
750 VIAL (EA) INTRAVENOUS EVERY 24 HOURS
Refills: 0 | Status: DISCONTINUED | OUTPATIENT
Start: 2023-01-14 | End: 2023-01-14

## 2023-01-14 RX ORDER — ONDANSETRON 8 MG/1
4 TABLET, FILM COATED ORAL ONCE
Refills: 0 | Status: COMPLETED | OUTPATIENT
Start: 2023-01-14 | End: 2023-01-14

## 2023-01-14 RX ORDER — VANCOMYCIN HCL 1 G
500 VIAL (EA) INTRAVENOUS ONCE
Refills: 0 | Status: DISCONTINUED | OUTPATIENT
Start: 2023-01-14 | End: 2023-01-14

## 2023-01-14 RX ORDER — FENTANYL CITRATE 50 UG/ML
50 INJECTION INTRAVENOUS
Refills: 0 | Status: DISCONTINUED | OUTPATIENT
Start: 2023-01-14 | End: 2023-01-14

## 2023-01-14 RX ORDER — OXYCODONE HYDROCHLORIDE 5 MG/1
5 TABLET ORAL EVERY 4 HOURS
Refills: 0 | Status: DISCONTINUED | OUTPATIENT
Start: 2023-01-14 | End: 2023-01-18

## 2023-01-14 RX ORDER — HEPARIN SODIUM 5000 [USP'U]/ML
5000 INJECTION INTRAVENOUS; SUBCUTANEOUS EVERY 8 HOURS
Refills: 0 | Status: DISCONTINUED | OUTPATIENT
Start: 2023-01-14 | End: 2023-01-15

## 2023-01-14 RX ORDER — FENTANYL CITRATE 50 UG/ML
25 INJECTION INTRAVENOUS
Refills: 0 | Status: DISCONTINUED | OUTPATIENT
Start: 2023-01-14 | End: 2023-01-14

## 2023-01-14 RX ADMIN — HYDROMORPHONE HYDROCHLORIDE 1 MILLIGRAM(S): 2 INJECTION INTRAMUSCULAR; INTRAVENOUS; SUBCUTANEOUS at 14:30

## 2023-01-14 RX ADMIN — HEPARIN SODIUM 5000 UNIT(S): 5000 INJECTION INTRAVENOUS; SUBCUTANEOUS at 22:07

## 2023-01-14 RX ADMIN — FAMOTIDINE 20 MILLIGRAM(S): 10 INJECTION INTRAVENOUS at 18:22

## 2023-01-14 RX ADMIN — FENTANYL CITRATE 50 MICROGRAM(S): 50 INJECTION INTRAVENOUS at 13:15

## 2023-01-14 RX ADMIN — FENTANYL CITRATE 50 MICROGRAM(S): 50 INJECTION INTRAVENOUS at 12:59

## 2023-01-14 RX ADMIN — INSULIN GLARGINE 8 UNIT(S): 100 INJECTION, SOLUTION SUBCUTANEOUS at 22:01

## 2023-01-14 RX ADMIN — FAMOTIDINE 20 MILLIGRAM(S): 10 INJECTION INTRAVENOUS at 06:20

## 2023-01-14 RX ADMIN — HYDROMORPHONE HYDROCHLORIDE 1 MILLIGRAM(S): 2 INJECTION INTRAMUSCULAR; INTRAVENOUS; SUBCUTANEOUS at 14:11

## 2023-01-14 RX ADMIN — PIPERACILLIN AND TAZOBACTAM 25 GRAM(S): 4; .5 INJECTION, POWDER, LYOPHILIZED, FOR SOLUTION INTRAVENOUS at 18:21

## 2023-01-14 RX ADMIN — DORZOLAMIDE HYDROCHLORIDE TIMOLOL MALEATE 1 DROP(S): 20; 5 SOLUTION/ DROPS OPHTHALMIC at 06:22

## 2023-01-14 RX ADMIN — MIDODRINE HYDROCHLORIDE 10 MILLIGRAM(S): 2.5 TABLET ORAL at 09:34

## 2023-01-14 RX ADMIN — CHLORHEXIDINE GLUCONATE 1 APPLICATION(S): 213 SOLUTION TOPICAL at 10:00

## 2023-01-14 RX ADMIN — FENTANYL CITRATE 50 MICROGRAM(S): 50 INJECTION INTRAVENOUS at 14:00

## 2023-01-14 RX ADMIN — FENTANYL CITRATE 50 MICROGRAM(S): 50 INJECTION INTRAVENOUS at 13:43

## 2023-01-14 RX ADMIN — MUPIROCIN 1 APPLICATION(S): 20 OINTMENT TOPICAL at 18:22

## 2023-01-14 RX ADMIN — PIPERACILLIN AND TAZOBACTAM 25 GRAM(S): 4; .5 INJECTION, POWDER, LYOPHILIZED, FOR SOLUTION INTRAVENOUS at 06:15

## 2023-01-14 RX ADMIN — Medication 4: at 07:18

## 2023-01-14 RX ADMIN — OXYCODONE HYDROCHLORIDE 10 MILLIGRAM(S): 5 TABLET ORAL at 23:16

## 2023-01-14 RX ADMIN — CHLORHEXIDINE GLUCONATE 1 APPLICATION(S): 213 SOLUTION TOPICAL at 06:23

## 2023-01-14 RX ADMIN — HEPARIN SODIUM 5000 UNIT(S): 5000 INJECTION INTRAVENOUS; SUBCUTANEOUS at 15:44

## 2023-01-14 RX ADMIN — Medication 2: at 13:45

## 2023-01-14 RX ADMIN — Medication 975 MILLIGRAM(S): at 09:29

## 2023-01-14 RX ADMIN — ONDANSETRON 4 MILLIGRAM(S): 8 TABLET, FILM COATED ORAL at 15:18

## 2023-01-14 RX ADMIN — MUPIROCIN 1 APPLICATION(S): 20 OINTMENT TOPICAL at 06:21

## 2023-01-14 RX ADMIN — DORZOLAMIDE HYDROCHLORIDE TIMOLOL MALEATE 1 DROP(S): 20; 5 SOLUTION/ DROPS OPHTHALMIC at 18:22

## 2023-01-14 RX ADMIN — Medication 975 MILLIGRAM(S): at 18:22

## 2023-01-14 NOTE — PROGRESS NOTE ADULT - SUBJECTIVE AND OBJECTIVE BOX
GENERAL SURGERY PROGRESS NOTE    taken for fistula revision overnight  still complaining of hand pain  + thrill, incisions cdi  biphasic radial/ulnar    10-point review of systems completed and negative except as noted above.      OBJECTIVE    MEDICATIONS  acetaminophen     Tablet .. 975 milliGRAM(s) Oral every 8 hours  aluminum hydroxide/magnesium hydroxide/simethicone Suspension 30 milliLiter(s) Oral four times a day PRN  aspirin  chewable 81 milliGRAM(s) Oral daily  bisacodyl Suppository 10 milliGRAM(s) Rectal daily PRN  chlorhexidine 2% Cloths 1 Application(s) Topical <User Schedule>  chlorhexidine 2% Cloths 1 Application(s) Topical daily  dextrose 5%. 1000 milliLiter(s) IV Continuous <Continuous>  dextrose 5%. 1000 milliLiter(s) IV Continuous <Continuous>  dextrose 5%. 1000 milliLiter(s) IV Continuous <Continuous>  dextrose 5%. 1000 milliLiter(s) IV Continuous <Continuous>  dextrose 50% Injectable 25 Gram(s) IV Push once  dextrose 50% Injectable 12.5 Gram(s) IV Push once  dextrose 50% Injectable 25 Gram(s) IV Push once  dextrose 50% Injectable 25 Gram(s) IV Push once  dextrose 50% Injectable 12.5 Gram(s) IV Push once  dextrose 50% Injectable 25 Gram(s) IV Push once  dextrose Oral Gel 15 Gram(s) Oral once PRN  dextrose Oral Gel 15 Gram(s) Oral once PRN  dorzolamide 2%/timolol 0.5% Ophthalmic Solution 1 Drop(s) Left EYE two times a day  epoetin deidre-epbx (RETACRIT) Injectable 49516 Unit(s) IV Push <User Schedule>  famotidine    Tablet 20 milliGRAM(s) Oral every 12 hours  glucagon  Injectable 1 milliGRAM(s) IntraMuscular once  glucagon  Injectable 1 milliGRAM(s) IntraMuscular once  insulin glargine Injectable (LANTUS) 5 Unit(s) SubCutaneous at bedtime  insulin lispro (ADMELOG) corrective regimen sliding scale   SubCutaneous three times a day before meals  lactated ringers. 1000 milliLiter(s) IV Continuous <Continuous>  magnesium hydroxide Suspension 30 milliLiter(s) Oral daily PRN  midodrine. 10 milliGRAM(s) Oral three times a day  multivitamin 1 Tablet(s) Oral daily  mupirocin 2% Ointment 1 Application(s) Both Nostrils two times a day  oxyCODONE    IR 10 milliGRAM(s) Oral every 4 hours PRN  piperacillin/tazobactam IVPB.. 3.375 Gram(s) IV Intermittent every 12 hours      PHYSICAL EXAM  T(C): 37 (01-14-23 @ 06:29), Max: 37.2 (01-13-23 @ 09:34)  HR: 68 (01-14-23 @ 06:29) (60 - 74)  BP: 126/57 (01-14-23 @ 06:29) (89/49 - 136/61)  RR: 17 (01-14-23 @ 06:29) (10 - 18)  SpO2: 100% (01-14-23 @ 06:29) (98% - 100%)    01-13-23 @ 07:01  -  01-14-23 @ 07:00  --------------------------------------------------------  IN: 75 mL / OUT: 0 mL / NET: 75 mL        General: Appears well, NAD  Neuro: AAOx3  CHEST: Clear to auscultation bilaterally  CV: Regular rate and rhythm  Abdomen: soft, nontender, nondistended, no rebound or guarding  Extremities: Grossly symmetric, as above     LABS                        9.0    13.66 )-----------( 479      ( 14 Jan 2023 04:00 )             28.7     01-14    133<L>  |  94<L>  |  23  ----------------------------<  203<H>  4.2   |  26  |  6.36<H>    Ca    9.1      14 Jan 2023 04:00  Phos  2.0     01-13  Mg     2.10     01-13      PT/INR - ( 14 Jan 2023 04:00 )   PT: 14.0 sec;   INR: 1.20 ratio         PTT - ( 14 Jan 2023 04:00 )  PTT:29.3 sec      RADIOLOGY & ADDITIONAL STUDIES

## 2023-01-14 NOTE — PROGRESS NOTE ADULT - PROBLEM SELECTOR PLAN 3
Recently on HD MWF  RUE fistula s/p repeair and clear for use by Vacular   HD per Nephrology, currently no plans for urgent dialysis    C/w midodrine as well as epo injections.

## 2023-01-14 NOTE — PROGRESS NOTE ADULT - PROBLEM SELECTOR PLAN 2
Recent A1c 7.1, though this may be an underestimate in setting of anemia related to ESRD. Glucose has been over 200 in the mornings.   - increased lantus to 8 units tonight (ordered)   - Continue correctional admelog and monitor glucose in-house (goal 100-180).

## 2023-01-14 NOTE — PROGRESS NOTE ADULT - PROBLEM SELECTOR PLAN 1
# Sepsis secondary to RUE 3rd digit gangrene, POA:  - Febrile to 38.1, WBC 14 - sepsis resolved   - s/p Right 3rd finger amputation at metacarpal head  - Appreciate ID recommendations. Narrowed to zosyn - may be able to d/c 2-3 days post-op   - OR cultures so far growing  E. coli    # RT hand steal syndrome   - Vascular consult appreciated, s/p RUE fistula repair on 1/13   [ ] repeat RUE duplex to indicate volumes     #SUSI: Pt anticipated to RTOR for repeat I&D. RCRI = 2 (Class III) connoting 10.1% 30-day risk of major adverse cardiac event. Pt is without acute cardiac condition and tolerated first I&D. As workup is unlikely to , pt may RTOR for repeat I&D without further cardiac testing.

## 2023-01-14 NOTE — CONSULT NOTE ADULT - CONSULT REQUESTED DATE/TIME
11-Jan-2023 18:30
12-Jan-2023 17:11
12-Jan-2023 11:00
11-Jan-2023 12:51
14-Jan-2023 19:11
11-Jan-2023 11:23

## 2023-01-14 NOTE — PROGRESS NOTE ADULT - ASSESSMENT
Patient is a 46 y/o male PMH DM2, Bilateral BKA, ESRD (on HD MWF), last dialyzed yesterday transferred from [facility] emergently for evaluation of evaluation of right finger swelling/pain. Patient reports history of right finger pain after he had a fall one year ago. Patient had a wound on her second/middle finger who he was seeing a hand surgeon for outpatient but unable ultimately to continue seeing due to insurance issues. Patient reports his middle finger developed increased swelling and became black in color about two weeks ago. Denies fevers. Patient transferred to [facility] for further evaluation and emergent OR. Patient received broad spectrum Abx of Zosyn/vanco/clindamycin at [facility]. Interpretor phone used for translation with patient. ID# [id number]         septic workup and ID evaluation,send blood and urine cx,serial lactate levels,monitor vitals closley,ivfs hydration,monitor urine output and renal profile,iv abx as per id cons    CHRONIC KIDNEY DISEASE, STAGE 5:   Serum creatinine is stable at 5.2 , approximating a GFR of *** ml/min.   There is no progression.  No uremic symptoms. No evidence of  worsening  Anemia. Fluid status stable.   Will continue to avoid nephrotoxic drugs.  Patient remains asymptomatic.  Continue current therapy.    BP monitoring,continue current antihypertensive meds, low salt diet,followup with PMD in 1-2 weeks      ANEMIA PLAN:  Anemia of chronic disease:  Well controlled by epo  H and H subtherapeutic .  We will continue epo aiming for a HCT of 32-36 %.   We will monitor Iron stores, B12 and RBC folate .  epoetin deidre-epbx (RETACRIT) Injectable 41249 Unit(s) IV Push     Accuchecks monitoring and insulin sliding scale coverage, no concentrated sweets diet, serial labs and f/up with PMD, monitor HB A 1 C every 3-4 mnth

## 2023-01-14 NOTE — CONSULT NOTE ADULT - ASSESSMENT
ASSESSMENT:  47M PMHx DM2, Bilateral BKA, ESRD (on HD MWF), last dialyzed 1/13/23 transferred from [facility] emergently for evaluation of evaluation of right finger swelling/pain. Patient noted to have right hand steal syndrome due to R aVF, s/p 3rd digit amputation with necrotic tissue. Patient now s/p 3rd finger amputation at metacarpal head, hand I&D, CTR, Rpt I+D on 1/14 with hand surgery. SICU consulted for Q1H neurovascular checks, and pain management.     Patient received right infraclavicular regional nerve block in PACU.     PLAN:    NEURO:  AAOx3  right brachial plexus regional nerve block  monitor pain status, consider tylenol and dilaudid PRN or PCA       RESPIRATORY:   RA  maintain SatO2 >92%      CARDIOVASCULAR:  maintain MAP >65  continue home dose midodrine 10mg TID  vascular checks Q1H, monitor fistula site      GI/NUTRITION:  RD  senna/miralax for bowel regimen      GENITOURINARY/RENAL:  ESRD on HD (MWF), last HD on 1/13/23 with -1.6L off, f/u with nephrology for next HD session      HEMATOLOGIC:  HSQ DVT ppx  continue ASA 81mg  monitor H/H, coags      INFECTIOUS DISEASE:  monitor WBC/fevers  c/w zosyn      ENDOCRINE:  monitor glucose levels  Tawnya Quevedo 8U bedtime      The above plan was discussed with the SICU team during rounds.     --  Perlita Zimmerman MD, PGY-II  SICU [id number]

## 2023-01-14 NOTE — PROGRESS NOTE ADULT - NUTRITIONAL ASSESSMENT
MEDICATIONS  (STANDING):  acetaminophen     Tablet .. 975 milliGRAM(s) Oral every 8 hours  aspirin  chewable 81 milliGRAM(s) Oral daily  chlorhexidine 2% Cloths 1 Application(s) Topical <User Schedule>  chlorhexidine 2% Cloths 1 Application(s) Topical daily  dextrose 5%. 1000 milliLiter(s) (100 mL/Hr) IV Continuous <Continuous>  dextrose 5%. 1000 milliLiter(s) (50 mL/Hr) IV Continuous <Continuous>  dextrose 5%. 1000 milliLiter(s) (100 mL/Hr) IV Continuous <Continuous>  dextrose 5%. 1000 milliLiter(s) (50 mL/Hr) IV Continuous <Continuous>  dextrose 50% Injectable 25 Gram(s) IV Push once  dextrose 50% Injectable 12.5 Gram(s) IV Push once  dextrose 50% Injectable 25 Gram(s) IV Push once  dextrose 50% Injectable 25 Gram(s) IV Push once  dextrose 50% Injectable 12.5 Gram(s) IV Push once  dextrose 50% Injectable 25 Gram(s) IV Push once  dorzolamide 2%/timolol 0.5% Ophthalmic Solution 1 Drop(s) Left EYE two times a day  epoetin deidre-epbx (RETACRIT) Injectable 61279 Unit(s) IV Push <User Schedule>  famotidine    Tablet 20 milliGRAM(s) Oral every 12 hours  glucagon  Injectable 1 milliGRAM(s) IntraMuscular once  glucagon  Injectable 1 milliGRAM(s) IntraMuscular once  insulin glargine Injectable (LANTUS) 5 Unit(s) SubCutaneous at bedtime  insulin lispro (ADMELOG) corrective regimen sliding scale   SubCutaneous three times a day before meals  lactated ringers. 1000 milliLiter(s) (75 mL/Hr) IV Continuous <Continuous>  midodrine. 10 milliGRAM(s) Oral three times a day  multivitamin 1 Tablet(s) Oral daily  mupirocin 2% Ointment 1 Application(s) Both Nostrils two times a day  piperacillin/tazobactam IVPB.. 3.375 Gram(s) IV Intermittent every 12 hours

## 2023-01-14 NOTE — PROGRESS NOTE ADULT - SUBJECTIVE AND OBJECTIVE BOX
ORTHOPAEDICS DAILY PROGRESS NOTE:       SUBJECTIVE/ROS:  Seen and examined. Pain well controlled. NPO for OR today         MEDICATIONS  (STANDING):  acetaminophen     Tablet .. 975 milliGRAM(s) Oral every 8 hours  aspirin  chewable 81 milliGRAM(s) Oral daily  chlorhexidine 2% Cloths 1 Application(s) Topical <User Schedule>  chlorhexidine 2% Cloths 1 Application(s) Topical daily  dextrose 5%. 1000 milliLiter(s) (50 mL/Hr) IV Continuous <Continuous>  dextrose 5%. 1000 milliLiter(s) (100 mL/Hr) IV Continuous <Continuous>  dextrose 5%. 1000 milliLiter(s) (100 mL/Hr) IV Continuous <Continuous>  dextrose 5%. 1000 milliLiter(s) (50 mL/Hr) IV Continuous <Continuous>  dextrose 50% Injectable 25 Gram(s) IV Push once  dextrose 50% Injectable 12.5 Gram(s) IV Push once  dextrose 50% Injectable 25 Gram(s) IV Push once  dextrose 50% Injectable 25 Gram(s) IV Push once  dextrose 50% Injectable 12.5 Gram(s) IV Push once  dextrose 50% Injectable 25 Gram(s) IV Push once  dorzolamide 2%/timolol 0.5% Ophthalmic Solution 1 Drop(s) Left EYE two times a day  epoetin deidre-epbx (RETACRIT) Injectable 82008 Unit(s) IV Push <User Schedule>  famotidine    Tablet 20 milliGRAM(s) Oral every 12 hours  glucagon  Injectable 1 milliGRAM(s) IntraMuscular once  glucagon  Injectable 1 milliGRAM(s) IntraMuscular once  insulin glargine Injectable (LANTUS) 5 Unit(s) SubCutaneous at bedtime  insulin lispro (ADMELOG) corrective regimen sliding scale   SubCutaneous three times a day before meals  lactated ringers. 1000 milliLiter(s) (75 mL/Hr) IV Continuous <Continuous>  midodrine. 10 milliGRAM(s) Oral three times a day  multivitamin 1 Tablet(s) Oral daily  mupirocin 2% Ointment 1 Application(s) Both Nostrils two times a day  piperacillin/tazobactam IVPB.. 3.375 Gram(s) IV Intermittent every 12 hours    MEDICATIONS  (PRN):  aluminum hydroxide/magnesium hydroxide/simethicone Suspension 30 milliLiter(s) Oral four times a day PRN Indigestion  bisacodyl Suppository 10 milliGRAM(s) Rectal daily PRN If no bowel movement  dextrose Oral Gel 15 Gram(s) Oral once PRN Blood Glucose LESS THAN 70 milliGRAM(s)/deciliter  dextrose Oral Gel 15 Gram(s) Oral once PRN Blood Glucose LESS THAN 70 milliGRAM(s)/deciliter  magnesium hydroxide Suspension 30 milliLiter(s) Oral daily PRN Constipation  oxyCODONE    IR 10 milliGRAM(s) Oral every 4 hours PRN Severe Pain (7 - 10)      OBJECTIVE:    Vital Signs Last 24 Hrs  T(C): 37 (14 Jan 2023 06:29), Max: 37.2 (13 Jan 2023 09:34)  T(F): 98.6 (14 Jan 2023 06:29), Max: 98.9 (13 Jan 2023 09:34)  HR: 68 (14 Jan 2023 06:29) (60 - 74)  BP: 126/57 (14 Jan 2023 06:29) (89/49 - 136/61)  BP(mean): 59 (13 Jan 2023 17:45) (47 - 72)  RR: 17 (14 Jan 2023 06:29) (10 - 18)  SpO2: 100% (14 Jan 2023 06:29) (98% - 100%)    Parameters below as of 14 Jan 2023 06:29  Patient On (Oxygen Delivery Method): room air        I&O's Detail    13 Jan 2023 07:01  -  14 Jan 2023 07:00  --------------------------------------------------------  IN:    Lactated Ringers: 75 mL  Total IN: 75 mL    OUT:  Total OUT: 0 mL    Total NET: 75 mL          Daily     Daily     LABS:                        9.0    13.66 )-----------( 479      ( 14 Jan 2023 04:00 )             28.7     01-14    133<L>  |  94<L>  |  23  ----------------------------<  203<H>  4.2   |  26  |  6.36<H>    Ca    9.1      14 Jan 2023 04:00  Phos  2.0     01-13  Mg     2.10     01-13      PT/INR - ( 14 Jan 2023 04:00 )   PT: 14.0 sec;   INR: 1.20 ratio         PTT - ( 14 Jan 2023 04:00 )  PTT:29.3 sec              PHYSICAL EXAM:    Gen: NAD  Resp: Nonlabored  R UE:    dressing c/d/i   tenderness to palpation over other finger tips (chronic deformity to tip of thumb and long finger)  tenderness to palpation over wrist, slight erythema  Rest of hand WWP

## 2023-01-14 NOTE — CONSULT NOTE ADULT - CONSULT REASON
Neurovascular checks, aggressive pain control
no vision left eye
Finger Gangrene
Pre-op evaluation
Steal
comanagement

## 2023-01-14 NOTE — PROGRESS NOTE ADULT - ASSESSMENT
46 y/o M with steal syndrome POD 1 AVF proximalization of arterial inflow using right GSV     - Repeat ppg and duplex ultrasound of AVF (specifically indicate volume for fistula)  - can use AVF as needed  - keep right thigh dressing in place x 4days  - hand surgery per ortho  -Rest of care per primary

## 2023-01-14 NOTE — CONSULT NOTE ADULT - ATTENDING COMMENTS
I agree with the history, physical, and plan, which I have reviewed and edited where appropriate.  I agree with notes/assessment of health care providers on my service.  I have personally examined the patient.  I was physically present for the key portions of the evaluation and management (E/M) service provided.  I reviewed data and laboratory tests/x-rays and all pertinent electronic images.  The patient is a critical care patient with life threatening hemodynamic and metabolic instability in SICU.  Risk benefit analyses discussed.    The patient is in SICU with diagnosis mentioned in the note.    The plan is specified below.      ASSESSMENT:  47M PMHx DM2, Bilateral BKA, ESRD (on HD MWF), admitted with right finger swelling/pain concerning for steal syndrome due to R aVF, s/p 3rd digit amputation with necrotic tissue. Patient now s/p 3rd finger amputation at metacarpal head, hand I&D, CTR, Rpt I+D on 1/14 with hand surgery. SICU consulted for Q1H neurovascular checks, and pain management.     Patient received right infraclavicular regional nerve block in PACU.     PLAN:    NEURO: postoperative pain  right brachial plexus regional nerve block  standing tylenol, prn oxycodone   dilaudid pca if uncontrolled with block     RESPIRATORY:   RA    CARDIOVASCULAR:  continue home dose midodrine 10mg TID  vascular checks Q1H, monitor fistula site    GI/NUTRITION:  regular diet   senna/miralax for bowel regimen  pepcid     GENITOURINARY/RENAL: ESRD on HD (MW),  - f/u with nephrology for next HD session  - iv lock     HEMATOLOGIC:  HSQ DVT ppx  continue ASA 81mg    INFECTIOUS DISEASE: wet gangrene of R 3rd digit, s/p amputation and debridement   monitor WBC/fevers  c/w zosyn  f/u cultures     ENDOCRINE: DM II   Tawnya Quevedo 8U bedtime I agree with the history, physical, and plan, which I have reviewed and edited where appropriate.  I agree with notes/assessment of health care providers on my service.  I have personally examined the patient.  I was physically present for the key portions of the evaluation and management (E/M) service provided.  I reviewed data and laboratory tests/x-rays and all pertinent electronic images.  The patient is a critical care patient with life threatening hemodynamic and metabolic instability in SICU.  Risk benefit analyses discussed.    The patient is in SICU with diagnosis mentioned in the note.    The plan is specified below.      ASSESSMENT:  47M PMHx DM2, Bilateral BKA, ESRD (on HD MWF), admitted with right finger swelling/pain concerning for steal syndrome due to R aVF, s/p 3rd digit amputation with necrotic tissue. Patient now s/p 3rd finger amputation at metacarpal head, hand I&D. S/P revision of AVF on 1/13 by Dr. Palmer. Rpt I+D on 1/14 with hand surgery.  SICU consulted for Q1H neurovascular checks, and pain management.     Patient received right infraclavicular regional nerve block in PACU.     PLAN:    NEURO: postoperative pain  right brachial plexus regional nerve block  standing tylenol, prn oxycodone   dilaudid pca if uncontrolled with block     RESPIRATORY:   RA    CARDIOVASCULAR:  continue home dose midodrine 10mg TID  vascular checks Q1H, monitor fistula site  OK to use AVF per vascular   f/u repeat duplex and ppg    GI/NUTRITION:  regular diet   senna/miralax for bowel regimen  pepcid     GENITOURINARY/RENAL: ESRD on HD (MW),  - f/u with nephrology for next HD session  - iv lock     HEMATOLOGIC:  HSQ DVT ppx  continue ASA 81mg    INFECTIOUS DISEASE: wet gangrene of R 3rd digit, s/p amputation and debridement   monitor WBC/fevers  c/w zosyn  f/u cultures     ENDOCRINE: DM II   mISS, Natetus 8U bedtime

## 2023-01-14 NOTE — CHART NOTE - NSCHARTNOTEFT_GEN_A_CORE
ORTHO PROGRESS NOTE     Pt seen and examined at bedside, denies SOB, CP, Dizziness. N/V/D /HA.. Pain well controlled. after block placed    Vital Signs Last 24 Hrs  T(C): 36.7 (14 Jan 2023 15:00), Max: 37.7 (14 Jan 2023 06:05)  T(F): 98.1 (14 Jan 2023 15:00), Max: 99.8 (14 Jan 2023 06:05)  HR: 75 (14 Jan 2023 15:15) (59 - 77)  BP: 155/88 (14 Jan 2023 15:15) (72/40 - 156/58)  BP(mean): 104 (14 Jan 2023 15:15) (47 - 104)  RR: 15 (14 Jan 2023 15:15) (10 - 30)  SpO2: 100% (14 Jan 2023 15:15) (98% - 100%)    Parameters below as of 14 Jan 2023 15:00  Patient On (Oxygen Delivery Method): nasal cannula  O2 Flow (L/min): 2      Gen: NAD  Resp: Nonlabored  R UE:    dressing c/d/i   tenderness to palpation over other finger tips (chronic deformity to tip of thumb and long finger)  tenderness to palpation over wrist, slight erythema  Rest of hand WWP      Labs:  CBC Full  -  ( 14 Jan 2023 13:32 )  WBC Count : 12.56 K/uL  RBC Count : 2.90 M/uL  Hemoglobin : 8.5 g/dL  Hematocrit : 27.6 %  Platelet Count - Automated : 441 K/uL  Mean Cell Volume : 95.2 fL  Mean Cell Hemoglobin : 29.3 pg  Mean Cell Hemoglobin Concentration : 30.8 gm/dL  Auto Neutrophil # : x  Auto Lymphocyte # : x  Auto Monocyte # : x  Auto Eosinophil # : x  Auto Basophil # : x  Auto Neutrophil % : x  Auto Lymphocyte % : x  Auto Monocyte % : x  Auto Eosinophil % : x  Auto Basophil % : x      01-14    133<L>  |  93<L>  |  26<H>  ----------------------------<  153<H>  4.3   |  23  |  6.65<H>    Ca    8.9      14 Jan 2023 13:32  Phos  3.4     01-14  Mg     2.10     01-14    A/P: 47y y/o Male s/p Right 3rd finger amputation at metacarpal head, hand I&D, CTR, Rpt I+D on 1/14        -Pain control/analgesia  -DVT ppx - Venodynes/HSQ  -FU AM Labs  -Non-weight bearing right upper extremity in bulky dressing  -Daily dressing and packing changes  -FU nephrology and HD (MWF)  -FU ID recs (kassandra, zosyn)  -Appreciate optho recs: outpatient follow-up

## 2023-01-14 NOTE — PROGRESS NOTE ADULT - SUBJECTIVE AND OBJECTIVE BOX
Patient is a 47y old  Male who presents with a chief complaint of s/p Right 3rd finger amputation at metacarpal head, hand I&D and CTR (10 Sudeep 2023 21:43)    Kendall Hedrick MD   [facility] Medicine   Pager [phone]  Reachable on Microsoft Teams     SUBJECTIVE / OVERNIGHT EVENTS:  Patient seen and examined this morning. He states that pain in his hand is slightly improved since procedure yesterday. Planned for OR later today     MEDICATIONS  (STANDING):  acetaminophen     Tablet .. 975 milliGRAM(s) Oral every 8 hours  aspirin  chewable 81 milliGRAM(s) Oral daily  chlorhexidine 2% Cloths 1 Application(s) Topical <User Schedule>  chlorhexidine 2% Cloths 1 Application(s) Topical daily  dextrose 5%. 1000 milliLiter(s) (50 mL/Hr) IV Continuous <Continuous>  dextrose 5%. 1000 milliLiter(s) (100 mL/Hr) IV Continuous <Continuous>  dextrose 5%. 1000 milliLiter(s) (100 mL/Hr) IV Continuous <Continuous>  dextrose 5%. 1000 milliLiter(s) (50 mL/Hr) IV Continuous <Continuous>  dextrose 50% Injectable 25 Gram(s) IV Push once  dextrose 50% Injectable 12.5 Gram(s) IV Push once  dextrose 50% Injectable 25 Gram(s) IV Push once  dextrose 50% Injectable 25 Gram(s) IV Push once  dextrose 50% Injectable 12.5 Gram(s) IV Push once  dextrose 50% Injectable 25 Gram(s) IV Push once  dorzolamide 2%/timolol 0.5% Ophthalmic Solution 1 Drop(s) Left EYE two times a day  epoetin deidre-epbx (RETACRIT) Injectable 45392 Unit(s) IV Push <User Schedule>  famotidine    Tablet 20 milliGRAM(s) Oral every 12 hours  glucagon  Injectable 1 milliGRAM(s) IntraMuscular once  glucagon  Injectable 1 milliGRAM(s) IntraMuscular once  heparin   Injectable 5000 Unit(s) SubCutaneous every 8 hours  HYDROmorphone  Injectable 1 milliGRAM(s) IV Push once  insulin glargine Injectable (LANTUS) 5 Unit(s) SubCutaneous at bedtime  insulin lispro (ADMELOG) corrective regimen sliding scale   SubCutaneous three times a day before meals  lactated ringers. 1000 milliLiter(s) (75 mL/Hr) IV Continuous <Continuous>  midodrine. 10 milliGRAM(s) Oral three times a day  multivitamin 1 Tablet(s) Oral daily  mupirocin 2% Ointment 1 Application(s) Both Nostrils two times a day  piperacillin/tazobactam IVPB.. 3.375 Gram(s) IV Intermittent every 12 hours  vancomycin  IVPB 750 milliGRAM(s) IV Intermittent every 24 hours    MEDICATIONS  (PRN):  aluminum hydroxide/magnesium hydroxide/simethicone Suspension 30 milliLiter(s) Oral four times a day PRN Indigestion  bisacodyl Suppository 10 milliGRAM(s) Rectal daily PRN If no bowel movement  dextrose Oral Gel 15 Gram(s) Oral once PRN Blood Glucose LESS THAN 70 milliGRAM(s)/deciliter  dextrose Oral Gel 15 Gram(s) Oral once PRN Blood Glucose LESS THAN 70 milliGRAM(s)/deciliter  fentaNYL    Injectable 25 MICROGram(s) IV Push every 5 minutes PRN Moderate Pain (4 - 6)  magnesium hydroxide Suspension 30 milliLiter(s) Oral daily PRN Constipation  ondansetron Injectable 4 milliGRAM(s) IV Push once PRN Nausea and/or Vomiting  oxyCODONE    IR 5 milliGRAM(s) Oral every 4 hours PRN Moderate Pain (4 - 6)  oxyCODONE    IR 10 milliGRAM(s) Oral every 4 hours PRN Severe Pain (7 - 10)      Vital Signs Last 24 Hrs  T(C): 36.7 (14 Jan 2023 12:45), Max: 37.7 (14 Jan 2023 06:05)  T(F): 98.1 (14 Jan 2023 12:45), Max: 99.8 (14 Jan 2023 06:05)  HR: 74 (14 Jan 2023 13:30) (60 - 77)  BP: 113/57 (14 Jan 2023 13:30) (89/49 - 156/58)  BP(mean): 71 (14 Jan 2023 13:30) (47 - 79)  RR: 20 (14 Jan 2023 13:30) (10 - 20)  SpO2: 100% (14 Jan 2023 13:30) (98% - 100%)  CAPILLARY BLOOD GLUCOSE      POCT Blood Glucose.: 162 mg/dL (14 Jan 2023 13:05)  POCT Blood Glucose.: 152 mg/dL (14 Jan 2023 09:52)  POCT Blood Glucose.: 203 mg/dL (14 Jan 2023 07:08)  POCT Blood Glucose.: 240 mg/dL (13 Jan 2023 22:23)  POCT Blood Glucose.: 181 mg/dL (13 Jan 2023 18:30)  POCT Blood Glucose.: 165 mg/dL (13 Jan 2023 16:24)    I&O's Summary    13 Jan 2023 07:01  -  14 Jan 2023 07:00  --------------------------------------------------------  IN: 75 mL / OUT: 0 mL / NET: 75 mL    14 Jan 2023 07:01  -  14 Jan 2023 13:58  --------------------------------------------------------  IN: 385 mL / OUT: 0 mL / NET: 385 mL        General: NAD, awake and alert  HENMT: NCAT, MMM  Neck: Supple, trachea midline   Respiratory: No respiratory distress, CTABL, No rales, rhonchi, wheezing.  Cardiovascular: S1,S2; Regular rate and rhythm; No appreciable murmurs.   Gastrointestinal: Soft, Nontender, Nondistended; +BS.   Extremities: right hand wrapped, bandages clean. RUE AVF with thrill.  No c/c/e; bilateral BKAs  Neurological: Moving all 4 extremities; Sensation to LT grossly in tact in BLEs.  Skin: No rashes, No erythema   Psych: appropriate mood and affect    LABS:                        8.5    12.56 )-----------( 441      ( 14 Jan 2023 13:32 )             27.6     01-14    133<L>  |  94<L>  |  23  ----------------------------<  203<H>  4.2   |  26  |  6.36<H>    Ca    9.1      14 Jan 2023 04:00  Phos  2.0     01-13  Mg     2.10     01-13      PT/INR - ( 14 Jan 2023 04:00 )   PT: 14.0 sec;   INR: 1.20 ratio         PTT - ( 14 Jan 2023 04:00 )  PTT:29.3 sec          RADIOLOGY & ADDITIONAL TESTS:    Imaging Personally Reviewed:    Consultant(s) Notes Reviewed:      Care Discussed with Consultants/Other Providers:

## 2023-01-14 NOTE — PRE-OP CHECKLIST - SELECT TESTS ORDERED
BMP/CBC/PT/PTT/Type and Screen/COVID-19  at 0952/BMP/CBC/PT/PTT/Type and Screen/POCT Blood Glucose/COVID-19

## 2023-01-14 NOTE — PROGRESS NOTE ADULT - ASSESSMENT
A/P: 47y y/o Male s/p Right 3rd finger amputation at metacarpal head, hand I&D and CTR,         - OR today in AM for repeat I&D possible closure  -Pain control/analgesia  -DVT ppx - Venodynes/HSQ  -FU AM Labs  -Non-weight bearing right upper extremity in bulky dressing  -Daily dressing and packing changes  -FU nephrology and HD (MWF)  -FU ID recs (vanc, zosyn)  -[facility] recs: outpatient follow-up

## 2023-01-14 NOTE — CONSULT NOTE ADULT - SUBJECTIVE AND OBJECTIVE BOX
HISTORY OF PRESENT ILLNESS:  47M PMHx DM2, Bilateral BKA, ESRD (on HD MWF), last dialyzed 1/13/23 transferred from [facility] emergently for evaluation of evaluation of right finger swelling/pain. Patient reports history of right finger pain after he had a fall one year ago. Patient had a wound on second/middle finger who he was seeing a hand surgeon for outpatient but unable ultimately to continue seeing due to insurance issues. Patient reports his middle finger developed increased swelling and became black in color about two weeks ago. Denies fevers.   Patient noted to have right hand steal syndrome due to R aVF, s/p 3rd digit amputation with necrotic tissue. Patient now s/p 3rd finger amputation at metacarpal head, hand I&D, CTR, Rpt I+D on 1/14 with hand surgery. SICU consulted for Q1H neurovascular checks, and pain management.     Patient received right infraclavicular regional nerve block in PACU.   ----------  CODE STATUS:    ----------  ALLERGIES:  No Known Drug Allergies  [place] (Rash)    ----------  PAST MEDICAL & SURGICAL HISTORY:  ESRD on dialysis  H/O hypotension  Diabetes mellitus  S/P BKA (below knee amputation) bilateral  AV fistula        ----------  SOCIAL HISTORY:    ----------  FAMILY HISTORY:    ----------  HOME MEDICATIONS:  Basaglar KwikPen 100 units/mL subcutaneous solution (10 Sudeep 2023 10:57)  insulin lispro 100 units/mL injectable solution (obsolete) (10 Sudeep 2023 10:57)  iron (10 Sudeep 2023 10:57)  Vitamin D3 (10 Sudeep 2023 10:57)    ----------    ----------  VITAL SIGNS:  T(C): 36.3 (01-14-23 @ 16:30), Max: 37.7 (01-14-23 @ 06:05)  HR: 81 (01-14-23 @ 17:15) (59 - 81)  BP: 138/76 (01-14-23 @ 17:15) (72/40 - 156/58)  ABP: --  ABP(mean): --  RR: 16 (01-14-23 @ 17:15) (10 - 30)  SpO2: 100% (01-14-23 @ 17:15) (99% - 100%)  CVP(mm Hg): --  ----------  MEDICATIONS  (STANDING):  acetaminophen     Tablet .. 975 milliGRAM(s) Oral every 8 hours  aspirin  chewable 81 milliGRAM(s) Oral daily  chlorhexidine 2% Cloths 1 Application(s) Topical <User Schedule>  chlorhexidine 2% Cloths 1 Application(s) Topical daily  dextrose 5%. 1000 milliLiter(s) (50 mL/Hr) IV Continuous <Continuous>  dextrose 5%. 1000 milliLiter(s) (100 mL/Hr) IV Continuous <Continuous>  dextrose 5%. 1000 milliLiter(s) (100 mL/Hr) IV Continuous <Continuous>  dextrose 5%. 1000 milliLiter(s) (50 mL/Hr) IV Continuous <Continuous>  dextrose 50% Injectable 25 Gram(s) IV Push once  dextrose 50% Injectable 12.5 Gram(s) IV Push once  dextrose 50% Injectable 25 Gram(s) IV Push once  dextrose 50% Injectable 25 Gram(s) IV Push once  dextrose 50% Injectable 12.5 Gram(s) IV Push once  dextrose 50% Injectable 25 Gram(s) IV Push once  dorzolamide 2%/timolol 0.5% Ophthalmic Solution 1 Drop(s) Left EYE two times a day  epoetin deidre-epbx (RETACRIT) Injectable 08112 Unit(s) IV Push <User Schedule>  famotidine    Tablet 20 milliGRAM(s) Oral every 12 hours  glucagon  Injectable 1 milliGRAM(s) IntraMuscular once  glucagon  Injectable 1 milliGRAM(s) IntraMuscular once  heparin   Injectable 5000 Unit(s) SubCutaneous every 8 hours  insulin glargine Injectable (LANTUS) 8 Unit(s) SubCutaneous at bedtime  insulin lispro (ADMELOG) corrective regimen sliding scale   SubCutaneous three times a day before meals  lactated ringers. 1000 milliLiter(s) (75 mL/Hr) IV Continuous <Continuous>  midodrine. 10 milliGRAM(s) Oral three times a day  multivitamin 1 Tablet(s) Oral daily  mupirocin 2% Ointment 1 Application(s) Both Nostrils two times a day  piperacillin/tazobactam IVPB.. 3.375 Gram(s) IV Intermittent every 12 hours    MEDICATIONS  (PRN):  aluminum hydroxide/magnesium hydroxide/simethicone Suspension 30 milliLiter(s) Oral four times a day PRN Indigestion  bisacodyl Suppository 10 milliGRAM(s) Rectal daily PRN If no bowel movement  dextrose Oral Gel 15 Gram(s) Oral once PRN Blood Glucose LESS THAN 70 milliGRAM(s)/deciliter  dextrose Oral Gel 15 Gram(s) Oral once PRN Blood Glucose LESS THAN 70 milliGRAM(s)/deciliter  magnesium hydroxide Suspension 30 milliLiter(s) Oral daily PRN Constipation  oxyCODONE    IR 5 milliGRAM(s) Oral every 4 hours PRN Moderate Pain (4 - 6)  oxyCODONE    IR 10 milliGRAM(s) Oral every 4 hours PRN Severe Pain (7 - 10)    ----------  NEURO    Exam: AAOx4  RUE appropriately numb and without pain     ----------  RESPIRATORY  Exam: Respirations grossly unlabored, b/l chest rise. No wheezes / rhonchi / strior / crackles.       ----------  CARDIOVASCULAR    Exam: No murmurs / rubs / gallops. No CP or SOB.  Cardiac Rhythm: NSR / ST on telemetry in sicu    midodrine. 10 milliGRAM(s) Oral three times a day    ----------  GI/NUTRITION    Exam: SNTND    Diet: RD  ----------  GENITOURINARY/RENAL        01-13 @ 07:01 - 01-14 @ 07:00  --------------------------------------------------------  IN: 75 mL / OUT: 0 mL / NET: 75 mL    01-14 @ 07:01 - 01-14 @ 19:13  --------------------------------------------------------  IN: 585 mL / OUT: 0 mL / NET: 585 mL      Weight (kg): 70 (01-14 @ 10:37)  01-14    133<L>  |  93<L>  |  26<H>  ----------------------------<  153<H>  4.3   |  23  |  6.65<H>    Ca    8.9      14 Jan 2023 13:32  Phos  3.4     01-14  Mg     2.10     01-14          ----------  HEMATOLOGIC  Transfusion: [x ] No transfusion in past 24h.  [ ] PRBC	[ ] Platelets	[ ] FFP	[ ] Cryoprecipitate  VTE Prophylaxis: aspirin  chewable 81 milliGRAM(s) Oral daily  heparin   Injectable 5000 Unit(s) SubCutaneous every 8 hours                            8.5    12.56 )-----------( 441      ( 14 Jan 2023 13:32 )             27.6     PT/INR - ( 14 Jan 2023 04:00 )   PT: 14.0 sec;   INR: 1.20 ratio         PTT - ( 14 Jan 2023 04:00 )  PTT:29.3 sec  ----------  INFECTIOUS DISEASES  epoetin deidre-epbx (RETACRIT) Injectable 44400 Unit(s) IV Push <User Schedule>  piperacillin/tazobactam IVPB.. 3.375 Gram(s) IV Intermittent every 12 hours                          8.5    12.56 )-----------( 441      ( 14 Jan 2023 13:32 )             27.6       ----------  ENDOCRINE  dextrose 50% Injectable 25 Gram(s) IV Push once  dextrose 50% Injectable 12.5 Gram(s) IV Push once  dextrose 50% Injectable 25 Gram(s) IV Push once  dextrose 50% Injectable 25 Gram(s) IV Push once  dextrose 50% Injectable 12.5 Gram(s) IV Push once  dextrose 50% Injectable 25 Gram(s) IV Push once  dextrose Oral Gel 15 Gram(s) Oral once PRN  dextrose Oral Gel 15 Gram(s) Oral once PRN  glucagon  Injectable 1 milliGRAM(s) IntraMuscular once  glucagon  Injectable 1 milliGRAM(s) IntraMuscular once  insulin glargine Injectable (LANTUS) 8 Unit(s) SubCutaneous at bedtime  insulin lispro (ADMELOG) corrective regimen sliding scale   SubCutaneous three times a day before meals    CAPILLARY BLOOD GLUCOSE    POCT Blood Glucose.: 127 mg/dL (14 Jan 2023 16:47)  POCT Blood Glucose.: 162 mg/dL (14 Jan 2023 13:05)  POCT Blood Glucose.: 152 mg/dL (14 Jan 2023 09:52)  POCT Blood Glucose.: 203 mg/dL (14 Jan 2023 07:08)  POCT Blood Glucose.: 240 mg/dL (13 Jan 2023 22:23)    ----------  TUBES AND LINES:  [x] Peripheral IV  [ ] Central Venous Line	[ ] R	[ ] L	[ ] IJ	        [ ] Fem	[ ] SC	Placed:   [ ] Arterial Line		        [ ] R	[ ] L	[ ] Fem	[ ] Rad	[ ] Ax	Placed:   [ ] PICC:					[ ] Mediport  [ ] Urinary Catheter                                                                             Placed:   [ ] Chest Tube                       [ ] R [ ] L                   Output: __cc/24h    [x] Necessity of urinary, arterial, and venous catheters discussed with SICU team on rounds.    ----------  OTHER MEDICATIONS:   chlorhexidine 2% Cloths 1 Application(s) Topical <User Schedule>  chlorhexidine 2% Cloths 1 Application(s) Topical daily  dorzolamide 2%/timolol 0.5% Ophthalmic Solution 1 Drop(s) Left EYE two times a day  mupirocin 2% Ointment 1 Application(s) Both Nostrils two times a day    ----------  IMAGING STUDIES:

## 2023-01-15 ENCOUNTER — TRANSCRIPTION ENCOUNTER (OUTPATIENT)
Age: 48
End: 2023-01-15

## 2023-01-15 LAB
ANION GAP SERPL CALC-SCNC: 16 MMOL/L — HIGH (ref 7–14)
APTT BLD: 29.3 SEC — SIGNIFICANT CHANGE UP (ref 27–36.3)
BUN SERPL-MCNC: 11 MG/DL — SIGNIFICANT CHANGE UP (ref 7–23)
CALCIUM SERPL-MCNC: 9.1 MG/DL — SIGNIFICANT CHANGE UP (ref 8.4–10.5)
CHLORIDE SERPL-SCNC: 94 MMOL/L — LOW (ref 98–107)
CO2 SERPL-SCNC: 25 MMOL/L — SIGNIFICANT CHANGE UP (ref 22–31)
CREAT SERPL-MCNC: 3.46 MG/DL — HIGH (ref 0.5–1.3)
CULTURE RESULTS: SIGNIFICANT CHANGE UP
CULTURE RESULTS: SIGNIFICANT CHANGE UP
EGFR: 21 ML/MIN/1.73M2 — LOW
GLUCOSE BLDC GLUCOMTR-MCNC: 177 MG/DL — HIGH (ref 70–99)
GLUCOSE BLDC GLUCOMTR-MCNC: 178 MG/DL — HIGH (ref 70–99)
GLUCOSE BLDC GLUCOMTR-MCNC: 240 MG/DL — HIGH (ref 70–99)
GLUCOSE BLDC GLUCOMTR-MCNC: 268 MG/DL — HIGH (ref 70–99)
GLUCOSE SERPL-MCNC: 163 MG/DL — HIGH (ref 70–99)
HCT VFR BLD CALC: 27.3 % — LOW (ref 39–50)
HGB BLD-MCNC: 8.4 G/DL — LOW (ref 13–17)
INR BLD: 1.17 RATIO — HIGH (ref 0.88–1.16)
MAGNESIUM SERPL-MCNC: 2 MG/DL — SIGNIFICANT CHANGE UP (ref 1.6–2.6)
MCHC RBC-ENTMCNC: 28.9 PG — SIGNIFICANT CHANGE UP (ref 27–34)
MCHC RBC-ENTMCNC: 30.8 GM/DL — LOW (ref 32–36)
MCV RBC AUTO: 93.8 FL — SIGNIFICANT CHANGE UP (ref 80–100)
NRBC # BLD: 0 /100 WBCS — SIGNIFICANT CHANGE UP (ref 0–0)
NRBC # FLD: 0 K/UL — SIGNIFICANT CHANGE UP (ref 0–0)
PHOSPHATE SERPL-MCNC: 2.5 MG/DL — SIGNIFICANT CHANGE UP (ref 2.5–4.5)
PLATELET # BLD AUTO: 469 K/UL — HIGH (ref 150–400)
POTASSIUM SERPL-MCNC: 4 MMOL/L — SIGNIFICANT CHANGE UP (ref 3.5–5.3)
POTASSIUM SERPL-SCNC: 4 MMOL/L — SIGNIFICANT CHANGE UP (ref 3.5–5.3)
PROTHROM AB SERPL-ACNC: 13.6 SEC — HIGH (ref 10.5–13.4)
RBC # BLD: 2.91 M/UL — LOW (ref 4.2–5.8)
RBC # FLD: 14.8 % — HIGH (ref 10.3–14.5)
SODIUM SERPL-SCNC: 135 MMOL/L — SIGNIFICANT CHANGE UP (ref 135–145)
SPECIMEN SOURCE: SIGNIFICANT CHANGE UP
SPECIMEN SOURCE: SIGNIFICANT CHANGE UP
WBC # BLD: 11.99 K/UL — HIGH (ref 3.8–10.5)
WBC # FLD AUTO: 11.99 K/UL — HIGH (ref 3.8–10.5)

## 2023-01-15 PROCEDURE — 99291 CRITICAL CARE FIRST HOUR: CPT | Mod: 25

## 2023-01-15 RX ORDER — FENTANYL CITRATE 50 UG/ML
25 INJECTION INTRAVENOUS ONCE
Refills: 0 | Status: DISCONTINUED | OUTPATIENT
Start: 2023-01-15 | End: 2023-01-15

## 2023-01-15 RX ORDER — HYDROMORPHONE HYDROCHLORIDE 2 MG/ML
1 INJECTION INTRAMUSCULAR; INTRAVENOUS; SUBCUTANEOUS
Refills: 0 | Status: DISCONTINUED | OUTPATIENT
Start: 2023-01-15 | End: 2023-01-18

## 2023-01-15 RX ADMIN — HYDROMORPHONE HYDROCHLORIDE 1 MILLIGRAM(S): 2 INJECTION INTRAMUSCULAR; INTRAVENOUS; SUBCUTANEOUS at 04:48

## 2023-01-15 RX ADMIN — MUPIROCIN 1 APPLICATION(S): 20 OINTMENT TOPICAL at 05:34

## 2023-01-15 RX ADMIN — CHLORHEXIDINE GLUCONATE 1 APPLICATION(S): 213 SOLUTION TOPICAL at 05:36

## 2023-01-15 RX ADMIN — PIPERACILLIN AND TAZOBACTAM 25 GRAM(S): 4; .5 INJECTION, POWDER, LYOPHILIZED, FOR SOLUTION INTRAVENOUS at 17:36

## 2023-01-15 RX ADMIN — INSULIN GLARGINE 8 UNIT(S): 100 INJECTION, SOLUTION SUBCUTANEOUS at 21:31

## 2023-01-15 RX ADMIN — Medication 975 MILLIGRAM(S): at 15:18

## 2023-01-15 RX ADMIN — HYDROMORPHONE HYDROCHLORIDE 1 MILLIGRAM(S): 2 INJECTION INTRAMUSCULAR; INTRAVENOUS; SUBCUTANEOUS at 01:00

## 2023-01-15 RX ADMIN — MIDODRINE HYDROCHLORIDE 10 MILLIGRAM(S): 2.5 TABLET ORAL at 11:59

## 2023-01-15 RX ADMIN — Medication 81 MILLIGRAM(S): at 11:59

## 2023-01-15 RX ADMIN — HEPARIN SODIUM 5000 UNIT(S): 5000 INJECTION INTRAVENOUS; SUBCUTANEOUS at 21:22

## 2023-01-15 RX ADMIN — Medication 975 MILLIGRAM(S): at 05:27

## 2023-01-15 RX ADMIN — FENTANYL CITRATE 25 MICROGRAM(S): 50 INJECTION INTRAVENOUS at 10:39

## 2023-01-15 RX ADMIN — FENTANYL CITRATE 25 MICROGRAM(S): 50 INJECTION INTRAVENOUS at 10:50

## 2023-01-15 RX ADMIN — Medication 2: at 11:58

## 2023-01-15 RX ADMIN — FENTANYL CITRATE 25 MICROGRAM(S): 50 INJECTION INTRAVENOUS at 10:35

## 2023-01-15 RX ADMIN — MUPIROCIN 1 APPLICATION(S): 20 OINTMENT TOPICAL at 17:31

## 2023-01-15 RX ADMIN — MIDODRINE HYDROCHLORIDE 10 MILLIGRAM(S): 2.5 TABLET ORAL at 17:30

## 2023-01-15 RX ADMIN — DORZOLAMIDE HYDROCHLORIDE TIMOLOL MALEATE 1 DROP(S): 20; 5 SOLUTION/ DROPS OPHTHALMIC at 17:30

## 2023-01-15 RX ADMIN — HYDROMORPHONE HYDROCHLORIDE 1 MILLIGRAM(S): 2 INJECTION INTRAMUSCULAR; INTRAVENOUS; SUBCUTANEOUS at 00:35

## 2023-01-15 RX ADMIN — HEPARIN SODIUM 5000 UNIT(S): 5000 INJECTION INTRAVENOUS; SUBCUTANEOUS at 15:18

## 2023-01-15 RX ADMIN — PIPERACILLIN AND TAZOBACTAM 25 GRAM(S): 4; .5 INJECTION, POWDER, LYOPHILIZED, FOR SOLUTION INTRAVENOUS at 05:36

## 2023-01-15 RX ADMIN — Medication 4: at 07:56

## 2023-01-15 RX ADMIN — HEPARIN SODIUM 5000 UNIT(S): 5000 INJECTION INTRAVENOUS; SUBCUTANEOUS at 05:28

## 2023-01-15 RX ADMIN — Medication 1 TABLET(S): at 11:59

## 2023-01-15 RX ADMIN — Medication 975 MILLIGRAM(S): at 21:22

## 2023-01-15 RX ADMIN — OXYCODONE HYDROCHLORIDE 10 MILLIGRAM(S): 5 TABLET ORAL at 00:00

## 2023-01-15 RX ADMIN — MIDODRINE HYDROCHLORIDE 10 MILLIGRAM(S): 2.5 TABLET ORAL at 05:28

## 2023-01-15 RX ADMIN — DORZOLAMIDE HYDROCHLORIDE TIMOLOL MALEATE 1 DROP(S): 20; 5 SOLUTION/ DROPS OPHTHALMIC at 05:28

## 2023-01-15 RX ADMIN — FENTANYL CITRATE 25 MICROGRAM(S): 50 INJECTION INTRAVENOUS at 10:24

## 2023-01-15 RX ADMIN — FAMOTIDINE 20 MILLIGRAM(S): 10 INJECTION INTRAVENOUS at 05:28

## 2023-01-15 RX ADMIN — Medication 2: at 15:45

## 2023-01-15 RX ADMIN — HYDROMORPHONE HYDROCHLORIDE 1 MILLIGRAM(S): 2 INJECTION INTRAMUSCULAR; INTRAVENOUS; SUBCUTANEOUS at 05:30

## 2023-01-15 NOTE — PROGRESS NOTE ADULT - ASSESSMENT
ASSESSMENT:  Patient is a 47 year old male with a PMHx of DM2, bilateral BKAs and ESRD (on HD - M / W / F) who was transferred from [facility] emergently for evaluation of right finger swelling / pain.  Patient was found to have steal syndrome.  Patient is now S/P revision of arteriovenous dialysis fistula by vascular surgery on 1/13/23.  Patient is also S/P right hand / forearm I&D by ortho on 1/14/23.      PLAN:    NEUROLOGY:  - Continue with Tylenol, Oxycodone and Dilaudid for pain control  - Consider PCA pump if pain remains uncontrolled    RESPIRATORY:  - No active issues  - Saturating well on room air  - Continuous pulse oximetry  - Maintain O2 saturation >92%    CARDIOVASCULAR:  - Hypotensive at baseline  - Continue with home dose Midodrine 10mg TID  - Keep MAP >65    GI / NUTRITION:  - Regular diet  - Bowel regimen with milk of magnesium and Dulcolax    RENAL / GENITOURINARY:  - HD as scheduled per nephrology  - Monitor electrolytes and replete PRN  - Continue to monitor strict ins and outs q1 hour    HEMATOLOGIC:  - Hemoglobin and hematocrit stable  - Continue with Aspirin  - VTE prophylaxis with Heparin subcutaneous    INFECTIOUS DISEASE:  - Currently afebrile with leukocytosis of 12.56  - Continue with IV Zosyn    ENDOCRINOLOGY:  - Monitor fingersticks before meals and at bedtime  - Continue with insulin sliding scale and Lantus 8 units qHS    Disposition: SICU    --------------------------------------------------------------------------------------

## 2023-01-15 NOTE — PROGRESS NOTE ADULT - SUBJECTIVE AND OBJECTIVE BOX
Surgery Progress Note    Subjective:     Patient seen and examined at bedside. POD 2 from fistula revision. VSS, AF. Succesfully completed HD using fistula.     OBJECTIVE:     T(C): 37.2 (01-15-23 @ 08:00), Max: 37.2 (01-15-23 @ 08:00)  HR: 79 (01-15-23 @ 08:00) (59 - 83)  BP: 103/59 (01-15-23 @ 08:00) (72/40 - 156/58)  RR: 14 (01-15-23 @ 08:00) (10 - 30)  SpO2: 100% (01-15-23 @ 08:00) (100% - 100%)  Wt(kg): --    I&O's Detail    14 Jan 2023 07:01  -  15 Sudeep 2023 07:00  --------------------------------------------------------  IN:    IV PiggyBack: 100 mL    Lactated Ringers: 225 mL    Oral Fluid: 660 mL  Total IN: 985 mL    OUT:    Voided (mL): 0 mL  Total OUT: 0 mL    Total NET: 985 mL      15 Sudeep 2023 07:01  -  15 Sudeep 2023 10:57  --------------------------------------------------------  IN:    Oral Fluid: 200 mL  Total IN: 200 mL    OUT:  Total OUT: 0 mL    Total NET: 200 mL          PHYSICAL EXAM:    General: Appears well, NAD. Non toxic appearing. Eating breakfast  Abdomen: soft, nontender, nondistended, no rebound or guarding  Extremities: RUE in ACE bandage with fingers missing. Brachiobasilic fistula underneath dressing. Aquacel dressing over RLE wound where GSV harvested from, c/d/i no serous leakage.     MEDICATIONS  (STANDING):  acetaminophen     Tablet .. 975 milliGRAM(s) Oral every 8 hours  aspirin  chewable 81 milliGRAM(s) Oral daily  chlorhexidine 2% Cloths 1 Application(s) Topical <User Schedule>  chlorhexidine 2% Cloths 1 Application(s) Topical daily  dextrose 5%. 1000 milliLiter(s) (50 mL/Hr) IV Continuous <Continuous>  dextrose 5%. 1000 milliLiter(s) (100 mL/Hr) IV Continuous <Continuous>  dextrose 5%. 1000 milliLiter(s) (100 mL/Hr) IV Continuous <Continuous>  dextrose 5%. 1000 milliLiter(s) (50 mL/Hr) IV Continuous <Continuous>  dextrose 50% Injectable 25 Gram(s) IV Push once  dextrose 50% Injectable 12.5 Gram(s) IV Push once  dextrose 50% Injectable 25 Gram(s) IV Push once  dextrose 50% Injectable 25 Gram(s) IV Push once  dextrose 50% Injectable 12.5 Gram(s) IV Push once  dextrose 50% Injectable 25 Gram(s) IV Push once  dorzolamide 2%/timolol 0.5% Ophthalmic Solution 1 Drop(s) Left EYE two times a day  epoetin deidre-epbx (RETACRIT) Injectable 09016 Unit(s) IV Push <User Schedule>  famotidine    Tablet 20 milliGRAM(s) Oral every 12 hours  glucagon  Injectable 1 milliGRAM(s) IntraMuscular once  glucagon  Injectable 1 milliGRAM(s) IntraMuscular once  heparin   Injectable 5000 Unit(s) SubCutaneous every 8 hours  insulin glargine Injectable (LANTUS) 8 Unit(s) SubCutaneous at bedtime  insulin lispro (ADMELOG) corrective regimen sliding scale   SubCutaneous three times a day before meals  midodrine. 10 milliGRAM(s) Oral three times a day  multivitamin 1 Tablet(s) Oral daily  mupirocin 2% Ointment 1 Application(s) Both Nostrils two times a day  piperacillin/tazobactam IVPB.. 3.375 Gram(s) IV Intermittent every 12 hours    MEDICATIONS  (PRN):  aluminum hydroxide/magnesium hydroxide/simethicone Suspension 30 milliLiter(s) Oral four times a day PRN Indigestion  bisacodyl Suppository 10 milliGRAM(s) Rectal daily PRN If no bowel movement  dextrose Oral Gel 15 Gram(s) Oral once PRN Blood Glucose LESS THAN 70 milliGRAM(s)/deciliter  dextrose Oral Gel 15 Gram(s) Oral once PRN Blood Glucose LESS THAN 70 milliGRAM(s)/deciliter  HYDROmorphone  Injectable 1 milliGRAM(s) IV Push every 3 hours PRN Severe Pain (7 - 10)  magnesium hydroxide Suspension 30 milliLiter(s) Oral daily PRN Constipation  oxyCODONE    IR 5 milliGRAM(s) Oral every 4 hours PRN Moderate Pain (4 - 6)  oxyCODONE    IR 10 milliGRAM(s) Oral every 4 hours PRN Severe Pain (7 - 10)      LABS:                          8.4    11.99 )-----------( 469      ( 15 Sudeep 2023 01:50 )             27.3     01-15    135  |  94<L>  |  11  ----------------------------<  163<H>  4.0   |  25  |  3.46<H>    Ca    9.1      15 Sudeep 2023 01:50  Phos  2.5     01-15  Mg     2.00     01-15      PT/INR - ( 15 Sudeep 2023 01:50 )   PT: 13.6 sec;   INR: 1.17 ratio         PTT - ( 15 Sudeep 2023 01:50 )  PTT:29.3 sec

## 2023-01-15 NOTE — PROGRESS NOTE ADULT - ASSESSMENT
48 y/o M with steal syndrome POD 1 AVF proximalization of arterial inflow using right GSV     - Please Repeat ppg and duplex ultrasound of AVF (specifically indicate volume rate for fistula)  - can use AVF as needed  - keep right thigh dressing in place x 4days  - hand surgery per ortho  - Rest of care per primary    Vascular Surgery  #[id number]

## 2023-01-15 NOTE — PROGRESS NOTE ADULT - SUBJECTIVE AND OBJECTIVE BOX
ORTHO PROGRESS NOTE     Pt seen and examined at bedside, denies SOB, CP, Dizziness. N/V/D /HA.  No significant overnight events. Pain well controlled.    Vital Signs Last 24 Hrs  T(C): 36.7 (15 Sudeep 2023 00:00), Max: 37.7 (14 Jan 2023 06:05)  T(F): 98 (15 Sudeep 2023 00:00), Max: 99.8 (14 Jan 2023 06:05)  HR: 82 (15 Sudeep 2023 03:00) (59 - 82)  BP: 103/63 (15 Sudeep 2023 03:00) (72/40 - 156/58)  BP(mean): 76 (15 Sudeep 2023 03:00) (48 - 104)  RR: 18 (15 Sudeep 2023 03:00) (10 - 30)  SpO2: 100% (15 Sudeep 2023 03:00) (99% - 100%)    Parameters below as of 15 Sudeep 2023 00:00  Patient On (Oxygen Delivery Method): room air        Gen: NAD  Resp: Nonlabored  R UE:    dressing c/d/i  moving fingers  Tenderness distal tips of fingers  Rest of hand WWP      Labs:  CBC Full  -  ( 15 Sudeep 2023 01:50 )  WBC Count : 11.99 K/uL  RBC Count : 2.91 M/uL  Hemoglobin : 8.4 g/dL  Hematocrit : 27.3 %  Platelet Count - Automated : 469 K/uL  Mean Cell Volume : 93.8 fL  Mean Cell Hemoglobin : 28.9 pg  Mean Cell Hemoglobin Concentration : 30.8 gm/dL  Auto Neutrophil # : x  Auto Lymphocyte # : x  Auto Monocyte # : x  Auto Eosinophil # : x  Auto Basophil # : x  Auto Neutrophil % : x  Auto Lymphocyte % : x  Auto Monocyte % : x  Auto Eosinophil % : x  Auto Basophil % : x      01-15    135  |  94<L>  |  11  ----------------------------<  163<H>  4.0   |  25  |  3.46<H>    Ca    9.1      15 Sudeep 2023 01:50  Phos  2.5     01-15  Mg     2.00     01-15        A/P: 47y y/o Male 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 Repeat I+D on 1/14        - OR tomorrow in AM for repeat I&D possible closure  -Pain control/analgesia  -DVT ppx - Venodynes/HSQ  -FU AM Labs  -Non-weight bearing right upper extremity in bulky dressing  -Daily dressing and packing changes  -FU nephrology and HD ([facility])  -FU ID recs (vanc, zosyn)  -[facility] recs: outpatient follow-up

## 2023-01-15 NOTE — PROGRESS NOTE ADULT - SUBJECTIVE AND OBJECTIVE BOX
SICU Daily Progress Note  =====================================================  Interval / Overnight Events: Required regional nerve block secondary to severe pain in right upper extremity.  Successfully received HD via right upper extremity fistula on 1/14/22.      HPI:  Patient is a 47 year old male with a PMHx of DM2, bilateral BKAs and ESRD (on HD - M / W / F) who was transferred from [facility] emergently for evaluation of right finger swelling / pain. (10 Sudeep 2023 15:44)      PAST MEDICAL & SURGICAL HISTORY:  ESRD on dialysis  H/O hypotension  Diabetes mellitus  S/P BKA (below knee amputation) bilateral  AV fistula      ALLERGIES:  No Known Drug Allergies  Letart (Rash)    --------------------------------------------------------------------------------------    MEDICATIONS:    Neurologic Medications  acetaminophen     Tablet .. 975 milliGRAM(s) Oral every 8 hours  HYDROmorphone  Injectable 1 milliGRAM(s) IV Push every 3 hours PRN Severe Pain (7 - 10)  oxyCODONE    IR 5 milliGRAM(s) Oral every 4 hours PRN Moderate Pain (4 - 6)  oxyCODONE    IR 10 milliGRAM(s) Oral every 4 hours PRN Severe Pain (7 - 10)    Cardiovascular Medications  midodrine. 10 milliGRAM(s) Oral three times a day    Gastrointestinal Medications  aluminum hydroxide/magnesium hydroxide/simethicone Suspension 30 milliLiter(s) Oral four times a day PRN Indigestion  bisacodyl Suppository 10 milliGRAM(s) Rectal daily PRN If no bowel movement  dextrose 5%. 1000 milliLiter(s) IV Continuous <Continuous>  dextrose 5%. 1000 milliLiter(s) IV Continuous <Continuous>  dextrose 5%. 1000 milliLiter(s) IV Continuous <Continuous>  dextrose 5%. 1000 milliLiter(s) IV Continuous <Continuous>  famotidine    Tablet 20 milliGRAM(s) Oral every 12 hours  magnesium hydroxide Suspension 30 milliLiter(s) Oral daily PRN Constipation  multivitamin 1 Tablet(s) Oral daily    Hematologic/Oncologic Medications  aspirin  chewable 81 milliGRAM(s) Oral daily  epoetin deidre-epbx (RETACRIT) Injectable 37205 Unit(s) IV Push <User Schedule>  heparin   Injectable 5000 Unit(s) SubCutaneous every 8 hours    Antimicrobial/Immunologic Medications  piperacillin/tazobactam IVPB.. 3.375 Gram(s) IV Intermittent every 12 hours    Endocrine/Metabolic Medications  dextrose 50% Injectable 25 Gram(s) IV Push once  dextrose 50% Injectable 12.5 Gram(s) IV Push once  dextrose 50% Injectable 25 Gram(s) IV Push once  dextrose 50% Injectable 25 Gram(s) IV Push once  dextrose 50% Injectable 12.5 Gram(s) IV Push once  dextrose 50% Injectable 25 Gram(s) IV Push once  dextrose Oral Gel 15 Gram(s) Oral once PRN Blood Glucose LESS THAN 70 milliGRAM(s)/deciliter  dextrose Oral Gel 15 Gram(s) Oral once PRN Blood Glucose LESS THAN 70 milliGRAM(s)/deciliter  glucagon  Injectable 1 milliGRAM(s) IntraMuscular once  glucagon  Injectable 1 milliGRAM(s) IntraMuscular once  insulin glargine Injectable (LANTUS) 8 Unit(s) SubCutaneous at bedtime  insulin lispro (ADMELOG) corrective regimen sliding scale   SubCutaneous three times a day before meals    Topical/Other Medications  chlorhexidine 2% Cloths 1 Application(s) Topical <User Schedule>  chlorhexidine 2% Cloths 1 Application(s) Topical daily  dorzolamide 2%/timolol 0.5% Ophthalmic Solution 1 Drop(s) Left EYE two times a day  mupirocin 2% Ointment 1 Application(s) Both Nostrils two times a day    --------------------------------------------------------------------------------------    VITAL SIGNS:  T(C): 36.5 (14 Jan 2023 21:15), Max: 37.7 (14 Jan 2023 06:05)  T(F): 97.7 (14 Jan 2023 21:15), Max: 99.8 (14 Jan 2023 06:05)  HR: 64 (14 Jan 2023 23:00) (59 - 81)  BP: 92/53 (14 Jan 2023 23:00) (72/40 - 156/58)  BP(mean): 64 (14 Jan 2023 23:00) (48 - 104)  RR: 16 (14 Jan 2023 23:00) (10 - 30)  SpO2: 100% (14 Jan 2023 23:00) (99% - 100%)    --------------------------------------------------------------------------------------    INS AND OUTS:    13 Jan 2023 07:01  -  14 Jan 2023 07:00  --------------------------------------------------------  IN:    Lactated Ringers: 75 mL  Total IN: 75 mL    OUT:  Total OUT: 0 mL    Total NET: 75 mL      14 Jan 2023 07:01  -  15 Sudeep 2023 00:38  --------------------------------------------------------  IN:    IV PiggyBack: 100 mL    Lactated Ringers: 225 mL    Oral Fluid: 460 mL  Total IN: 785 mL    OUT:    Voided (mL): 0 mL  Total OUT: 0 mL    Total NET: 785 mL    --------------------------------------------------------------------------------------    EXAM    NEUROLOGY  Exam: Normal, in no acute distress.    HEENT  Exam: Normocephalic, atraumatic.    RESPIRATORY  Exam: Normal expansion / effort.     CARDIOVASCULAR  Exam: S1, S2.  Regular rate and rhythm.    GI/NUTRITION  Exam: Abdomen soft, Non-tender, Non-distended.  Current Diet: Regular diet    VASCULAR  Exam: Right upper extremity AV fistula with palpable thrill.    MUSCULOSKELETAL  Exam: Right upper extremity dressing clean, dry and intact.  Bilateral BKAs.  All extremities moving spontaneously without limitations.      METABOLIC / FLUIDS / ELECTROLYTES  dextrose 5%. 1000 milliLiter(s) IV Continuous <Continuous>  dextrose 5%. 1000 milliLiter(s) IV Continuous <Continuous>  dextrose 5%. 1000 milliLiter(s) IV Continuous <Continuous>  dextrose 5%. 1000 milliLiter(s) IV Continuous <Continuous>  multivitamin 1 Tablet(s) Oral daily      HEMATOLOGIC  [x] VTE Prophylaxis: aspirin  chewable 81 milliGRAM(s) Oral daily  heparin   Injectable 5000 Unit(s) SubCutaneous every 8 hours      INFECTIOUS DISEASE  Antimicrobials/Immunologic Medications:  epoetin deidre-epbx (RETACRIT) Injectable 46259 Unit(s) IV Push <User Schedule>  piperacillin/tazobactam IVPB.. 3.375 Gram(s) IV Intermittent every 12 hours      TUBES / LINES / DRAINS  [x] Peripheral IV  [] Central Venous Line     	[] R	[] L	[] IJ	[] Fem	[] SC	Date Placed:   [] Arterial Line		[] R	[] L	[] Fem	[] Rad	[] Ax	Date Placed:   [] PICC		[] Midline		[] Mediport  [] Urinary Catheter		Date Placed:   [x] Necessity of urinary, arterial, and venous catheters discussed    --------------------------------------------------------------------------------------

## 2023-01-16 LAB
-  AMIKACIN: SIGNIFICANT CHANGE UP
-  AMOXICILLIN/CLAVULANIC ACID: SIGNIFICANT CHANGE UP
-  AMPICILLIN/SULBACTAM: SIGNIFICANT CHANGE UP
-  AMPICILLIN: SIGNIFICANT CHANGE UP
-  AZTREONAM: SIGNIFICANT CHANGE UP
-  CEFAZOLIN: SIGNIFICANT CHANGE UP
-  CEFEPIME: SIGNIFICANT CHANGE UP
-  CEFOXITIN: SIGNIFICANT CHANGE UP
-  CEFTRIAXONE: SIGNIFICANT CHANGE UP
-  CIPROFLOXACIN: SIGNIFICANT CHANGE UP
-  ERTAPENEM: SIGNIFICANT CHANGE UP
-  GENTAMICIN: SIGNIFICANT CHANGE UP
-  IMIPENEM: SIGNIFICANT CHANGE UP
-  LEVOFLOXACIN: SIGNIFICANT CHANGE UP
-  MEROPENEM: SIGNIFICANT CHANGE UP
-  PIPERACILLIN/TAZOBACTAM: SIGNIFICANT CHANGE UP
-  TOBRAMYCIN: SIGNIFICANT CHANGE UP
-  TRIMETHOPRIM/SULFAMETHOXAZOLE: SIGNIFICANT CHANGE UP
ANION GAP SERPL CALC-SCNC: 15 MMOL/L — HIGH (ref 7–14)
ANION GAP SERPL CALC-SCNC: 16 MMOL/L — HIGH (ref 7–14)
APTT BLD: 27.4 SEC — SIGNIFICANT CHANGE UP (ref 27–36.3)
APTT BLD: 29.9 SEC — SIGNIFICANT CHANGE UP (ref 27–36.3)
APTT BLD: 30.4 SEC — SIGNIFICANT CHANGE UP (ref 27–36.3)
BLD GP AB SCN SERPL QL: NEGATIVE — SIGNIFICANT CHANGE UP
BUN SERPL-MCNC: 26 MG/DL — HIGH (ref 7–23)
BUN SERPL-MCNC: 31 MG/DL — HIGH (ref 7–23)
CA-I BLD-SCNC: 1.08 MMOL/L — LOW (ref 1.15–1.29)
CALCIUM SERPL-MCNC: 8.8 MG/DL — SIGNIFICANT CHANGE UP (ref 8.4–10.5)
CALCIUM SERPL-MCNC: 8.9 MG/DL — SIGNIFICANT CHANGE UP (ref 8.4–10.5)
CHLORIDE SERPL-SCNC: 94 MMOL/L — LOW (ref 98–107)
CHLORIDE SERPL-SCNC: 95 MMOL/L — LOW (ref 98–107)
CO2 SERPL-SCNC: 25 MMOL/L — SIGNIFICANT CHANGE UP (ref 22–31)
CO2 SERPL-SCNC: 26 MMOL/L — SIGNIFICANT CHANGE UP (ref 22–31)
CREAT SERPL-MCNC: 6.58 MG/DL — HIGH (ref 0.5–1.3)
CREAT SERPL-MCNC: 7.57 MG/DL — HIGH (ref 0.5–1.3)
CULTURE RESULTS: SIGNIFICANT CHANGE UP
EGFR: 10 ML/MIN/1.73M2 — LOW
EGFR: 8 ML/MIN/1.73M2 — LOW
GLUCOSE BLDC GLUCOMTR-MCNC: 111 MG/DL — HIGH (ref 70–99)
GLUCOSE BLDC GLUCOMTR-MCNC: 137 MG/DL — HIGH (ref 70–99)
GLUCOSE BLDC GLUCOMTR-MCNC: 141 MG/DL — HIGH (ref 70–99)
GLUCOSE BLDC GLUCOMTR-MCNC: 173 MG/DL — HIGH (ref 70–99)
GLUCOSE BLDC GLUCOMTR-MCNC: 178 MG/DL — HIGH (ref 70–99)
GLUCOSE BLDC GLUCOMTR-MCNC: 217 MG/DL — HIGH (ref 70–99)
GLUCOSE BLDC GLUCOMTR-MCNC: 226 MG/DL — HIGH (ref 70–99)
GLUCOSE SERPL-MCNC: 156 MG/DL — HIGH (ref 70–99)
GLUCOSE SERPL-MCNC: 207 MG/DL — HIGH (ref 70–99)
HCT VFR BLD CALC: 25.1 % — LOW (ref 39–50)
HCT VFR BLD CALC: 25.6 % — LOW (ref 39–50)
HCT VFR BLD CALC: 28.3 % — LOW (ref 39–50)
HGB BLD-MCNC: 7.7 G/DL — LOW (ref 13–17)
HGB BLD-MCNC: 8.1 G/DL — LOW (ref 13–17)
HGB BLD-MCNC: 8.9 G/DL — LOW (ref 13–17)
INR BLD: 1.09 RATIO — SIGNIFICANT CHANGE UP (ref 0.88–1.16)
INR BLD: 1.12 RATIO — SIGNIFICANT CHANGE UP (ref 0.88–1.16)
INR BLD: 1.14 RATIO — SIGNIFICANT CHANGE UP (ref 0.88–1.16)
MAGNESIUM SERPL-MCNC: 2.1 MG/DL — SIGNIFICANT CHANGE UP (ref 1.6–2.6)
MAGNESIUM SERPL-MCNC: 2.2 MG/DL — SIGNIFICANT CHANGE UP (ref 1.6–2.6)
MCHC RBC-ENTMCNC: 28.7 PG — SIGNIFICANT CHANGE UP (ref 27–34)
MCHC RBC-ENTMCNC: 29.2 PG — SIGNIFICANT CHANGE UP (ref 27–34)
MCHC RBC-ENTMCNC: 29.3 PG — SIGNIFICANT CHANGE UP (ref 27–34)
MCHC RBC-ENTMCNC: 30.7 GM/DL — LOW (ref 32–36)
MCHC RBC-ENTMCNC: 31.4 GM/DL — LOW (ref 32–36)
MCHC RBC-ENTMCNC: 31.6 GM/DL — LOW (ref 32–36)
MCV RBC AUTO: 92.4 FL — SIGNIFICANT CHANGE UP (ref 80–100)
MCV RBC AUTO: 93.1 FL — SIGNIFICANT CHANGE UP (ref 80–100)
MCV RBC AUTO: 93.7 FL — SIGNIFICANT CHANGE UP (ref 80–100)
METHOD TYPE: SIGNIFICANT CHANGE UP
NRBC # BLD: 0 /100 WBCS — SIGNIFICANT CHANGE UP (ref 0–0)
NRBC # FLD: 0 K/UL — SIGNIFICANT CHANGE UP (ref 0–0)
ORGANISM # SPEC MICROSCOPIC CNT: SIGNIFICANT CHANGE UP
ORGANISM # SPEC MICROSCOPIC CNT: SIGNIFICANT CHANGE UP
PHOSPHATE SERPL-MCNC: 3.9 MG/DL — SIGNIFICANT CHANGE UP (ref 2.5–4.5)
PHOSPHATE SERPL-MCNC: 4.7 MG/DL — HIGH (ref 2.5–4.5)
PLATELET # BLD AUTO: 427 K/UL — HIGH (ref 150–400)
PLATELET # BLD AUTO: 436 K/UL — HIGH (ref 150–400)
PLATELET # BLD AUTO: 460 K/UL — HIGH (ref 150–400)
POTASSIUM SERPL-MCNC: 3.9 MMOL/L — SIGNIFICANT CHANGE UP (ref 3.5–5.3)
POTASSIUM SERPL-MCNC: 4.2 MMOL/L — SIGNIFICANT CHANGE UP (ref 3.5–5.3)
POTASSIUM SERPL-SCNC: 3.9 MMOL/L — SIGNIFICANT CHANGE UP (ref 3.5–5.3)
POTASSIUM SERPL-SCNC: 4.2 MMOL/L — SIGNIFICANT CHANGE UP (ref 3.5–5.3)
PROTHROM AB SERPL-ACNC: 12.7 SEC — SIGNIFICANT CHANGE UP (ref 10.5–13.4)
PROTHROM AB SERPL-ACNC: 13 SEC — SIGNIFICANT CHANGE UP (ref 10.5–13.4)
PROTHROM AB SERPL-ACNC: 13.2 SEC — SIGNIFICANT CHANGE UP (ref 10.5–13.4)
RBC # BLD: 2.68 M/UL — LOW (ref 4.2–5.8)
RBC # BLD: 2.77 M/UL — LOW (ref 4.2–5.8)
RBC # BLD: 3.04 M/UL — LOW (ref 4.2–5.8)
RBC # FLD: 15 % — HIGH (ref 10.3–14.5)
RBC # FLD: 15.2 % — HIGH (ref 10.3–14.5)
RBC # FLD: 15.8 % — HIGH (ref 10.3–14.5)
RH IG SCN BLD-IMP: POSITIVE — SIGNIFICANT CHANGE UP
SARS-COV-2 RNA SPEC QL NAA+PROBE: SIGNIFICANT CHANGE UP
SODIUM SERPL-SCNC: 135 MMOL/L — SIGNIFICANT CHANGE UP (ref 135–145)
SODIUM SERPL-SCNC: 136 MMOL/L — SIGNIFICANT CHANGE UP (ref 135–145)
SPECIMEN SOURCE: SIGNIFICANT CHANGE UP
WBC # BLD: 10.93 K/UL — HIGH (ref 3.8–10.5)
WBC # BLD: 11.69 K/UL — HIGH (ref 3.8–10.5)
WBC # BLD: 9.55 K/UL — SIGNIFICANT CHANGE UP (ref 3.8–10.5)
WBC # FLD AUTO: 10.93 K/UL — HIGH (ref 3.8–10.5)
WBC # FLD AUTO: 11.69 K/UL — HIGH (ref 3.8–10.5)
WBC # FLD AUTO: 9.55 K/UL — SIGNIFICANT CHANGE UP (ref 3.8–10.5)

## 2023-01-16 PROCEDURE — 26030 DRAINAGE OF PALM BURSAS: CPT | Mod: 58,RT

## 2023-01-16 PROCEDURE — 25295 RELEASE WRIST/FOREARM TENDON: CPT | Mod: 58,RT

## 2023-01-16 PROCEDURE — 36556 INSERT NON-TUNNEL CV CATH: CPT

## 2023-01-16 PROCEDURE — 99232 SBSQ HOSP IP/OBS MODERATE 35: CPT | Mod: 25,24,GC

## 2023-01-16 RX ORDER — FENTANYL CITRATE 50 UG/ML
50 INJECTION INTRAVENOUS ONCE
Refills: 0 | Status: DISCONTINUED | OUTPATIENT
Start: 2023-01-16 | End: 2023-01-16

## 2023-01-16 RX ORDER — HEPARIN SODIUM 5000 [USP'U]/ML
5000 INJECTION INTRAVENOUS; SUBCUTANEOUS EVERY 8 HOURS
Refills: 0 | Status: COMPLETED | OUTPATIENT
Start: 2023-01-16 | End: 2023-01-23

## 2023-01-16 RX ORDER — INSULIN LISPRO 100/ML
VIAL (ML) SUBCUTANEOUS
Refills: 0 | Status: DISCONTINUED | OUTPATIENT
Start: 2023-01-16 | End: 2023-01-17

## 2023-01-16 RX ORDER — INSULIN LISPRO 100/ML
VIAL (ML) SUBCUTANEOUS EVERY 6 HOURS
Refills: 0 | Status: DISCONTINUED | OUTPATIENT
Start: 2023-01-16 | End: 2023-01-16

## 2023-01-16 RX ADMIN — Medication 975 MILLIGRAM(S): at 21:39

## 2023-01-16 RX ADMIN — Medication 975 MILLIGRAM(S): at 14:16

## 2023-01-16 RX ADMIN — CHLORHEXIDINE GLUCONATE 1 APPLICATION(S): 213 SOLUTION TOPICAL at 05:59

## 2023-01-16 RX ADMIN — Medication 2: at 17:38

## 2023-01-16 RX ADMIN — Medication 4: at 05:57

## 2023-01-16 RX ADMIN — MUPIROCIN 1 APPLICATION(S): 20 OINTMENT TOPICAL at 17:39

## 2023-01-16 RX ADMIN — DORZOLAMIDE HYDROCHLORIDE TIMOLOL MALEATE 1 DROP(S): 20; 5 SOLUTION/ DROPS OPHTHALMIC at 17:39

## 2023-01-16 RX ADMIN — MIDODRINE HYDROCHLORIDE 10 MILLIGRAM(S): 2.5 TABLET ORAL at 17:39

## 2023-01-16 RX ADMIN — HEPARIN SODIUM 5000 UNIT(S): 5000 INJECTION INTRAVENOUS; SUBCUTANEOUS at 14:16

## 2023-01-16 RX ADMIN — FENTANYL CITRATE 50 MICROGRAM(S): 50 INJECTION INTRAVENOUS at 23:05

## 2023-01-16 RX ADMIN — HEPARIN SODIUM 5000 UNIT(S): 5000 INJECTION INTRAVENOUS; SUBCUTANEOUS at 21:21

## 2023-01-16 RX ADMIN — ERYTHROPOIETIN 10000 UNIT(S): 10000 INJECTION, SOLUTION INTRAVENOUS; SUBCUTANEOUS at 23:53

## 2023-01-16 RX ADMIN — Medication 1 TABLET(S): at 12:03

## 2023-01-16 RX ADMIN — Medication 975 MILLIGRAM(S): at 06:06

## 2023-01-16 RX ADMIN — INSULIN GLARGINE 8 UNIT(S): 100 INJECTION, SOLUTION SUBCUTANEOUS at 21:22

## 2023-01-16 RX ADMIN — DORZOLAMIDE HYDROCHLORIDE TIMOLOL MALEATE 1 DROP(S): 20; 5 SOLUTION/ DROPS OPHTHALMIC at 05:59

## 2023-01-16 RX ADMIN — Medication 81 MILLIGRAM(S): at 12:02

## 2023-01-16 RX ADMIN — MIDODRINE HYDROCHLORIDE 10 MILLIGRAM(S): 2.5 TABLET ORAL at 12:07

## 2023-01-16 RX ADMIN — FENTANYL CITRATE 50 MICROGRAM(S): 50 INJECTION INTRAVENOUS at 22:49

## 2023-01-16 RX ADMIN — MUPIROCIN 1 APPLICATION(S): 20 OINTMENT TOPICAL at 05:59

## 2023-01-16 RX ADMIN — Medication 975 MILLIGRAM(S): at 21:17

## 2023-01-16 NOTE — PROGRESS NOTE ADULT - SUBJECTIVE AND OBJECTIVE BOX
Patient is a 47y Male whom presented to the hospital with esrd on hd     PAST MEDICAL & SURGICAL HISTORY:      MEDICATIONS  (STANDING):  dextrose 5%. 1000 milliLiter(s) (100 mL/Hr) IV Continuous <Continuous>  dextrose 5%. 1000 milliLiter(s) (50 mL/Hr) IV Continuous <Continuous>  dextrose 50% Injectable 25 Gram(s) IV Push once  dextrose 50% Injectable 25 Gram(s) IV Push once  dextrose 50% Injectable 12.5 Gram(s) IV Push once  glucagon  Injectable 1 milliGRAM(s) IntraMuscular once  insulin lispro (ADMELOG) corrective regimen sliding scale   SubCutaneous every 6 hours  pantoprazole    Tablet 40 milliGRAM(s) Oral daily  polyethylene glycol 3350 17 Gram(s) Oral daily  senna 2 Tablet(s) Oral at bedtime      Allergies    No Known Allergies    Intolerances        SOCIAL HISTORY:  Denies ETOh,Smoking,     FAMILY HISTORY:      REVIEW OF SYSTEMS:    CONSTITUTIONAL: No weakness, fevers or chills  NECK: No pain or stiffness  RESPIRATORY: No cough, wheezing, hemoptysis; No shortness of breath  CARDIOVASCULAR: No chest pain or palpitations  GASTROINTESTINAL: No abdominal or epigastric pain. No nausea, vomiting,                                                                     7.7    10.93 )-----------( 436      ( 16 Jan 2023 04:10 )             25.1       CBC Full  -  ( 16 Jan 2023 04:10 )  WBC Count : 10.93 K/uL  RBC Count : 2.68 M/uL  Hemoglobin : 7.7 g/dL  Hematocrit : 25.1 %  Platelet Count - Automated : 436 K/uL  Mean Cell Volume : 93.7 fL  Mean Cell Hemoglobin : 28.7 pg  Mean Cell Hemoglobin Concentration : 30.7 gm/dL  Auto Neutrophil # : x  Auto Lymphocyte # : x  Auto Monocyte # : x  Auto Eosinophil # : x  Auto Basophil # : x  Auto Neutrophil % : x  Auto Lymphocyte % : x  Auto Monocyte % : x  Auto Eosinophil % : x  Auto Basophil % : x      01-16    135  |  94<L>  |  26<H>  ----------------------------<  207<H>  3.9   |  26  |  6.58<H>    Ca    8.8      16 Jan 2023 04:10  Phos  3.9     01-16  Mg     2.10     01-16        CAPILLARY BLOOD GLUCOSE      POCT Blood Glucose.: 141 mg/dL (16 Jan 2023 11:32)  POCT Blood Glucose.: 217 mg/dL (16 Jan 2023 05:56)  POCT Blood Glucose.: 268 mg/dL (15 Sudeep 2023 21:30)  POCT Blood Glucose.: 178 mg/dL (15 Sudeep 2023 15:33)      Vital Signs Last 24 Hrs  T(C): 36.4 (16 Jan 2023 12:00), Max: 37.3 (15 Sudeep 2023 16:00)  T(F): 97.5 (16 Jan 2023 12:00), Max: 99.1 (15 Sudeep 2023 16:00)  HR: 68 (16 Jan 2023 12:00) (58 - 79)  BP: 111/61 (16 Jan 2023 12:00) (100/53 - 144/74)  BP(mean): 76 (16 Jan 2023 12:00) (66 - 92)  RR: 15 (16 Jan 2023 12:00) (10 - 21)  SpO2: 100% (16 Jan 2023 12:00) (97% - 100%)    Parameters below as of 16 Jan 2023 12:00  Patient On (Oxygen Delivery Method): room air            PT/INR - ( 16 Jan 2023 04:10 )   PT: 13.2 sec;   INR: 1.14 ratio         PTT - ( 16 Jan 2023 04:10 )  PTT:27.4 sec      PHYSICAL EXAM:    Constitutional: NAD  HEENT: conjunctive   clear   Neck:  No JVD  Respiratory: CTAB  Cardiovascular: S1 and S2  Gastrointestinal: BS+, soft, NT/ND

## 2023-01-16 NOTE — PROGRESS NOTE ADULT - SUBJECTIVE AND OBJECTIVE BOX
ORTHO PROGRESS NOTE     Pt seen and examined at bedside, denies SOB, CP, Dizziness. N/V/D /HA.  No significant overnight events. Pain well controlled currently    Vital Signs Last 24 Hrs  T(C): 36.4 (16 Jan 2023 04:00), Max: 37.3 (15 Sudeep 2023 16:00)  T(F): 97.6 (16 Jan 2023 04:00), Max: 99.1 (15 Sudeep 2023 16:00)  HR: 63 (16 Jan 2023 06:00) (63 - 80)  BP: 134/72 (16 Jan 2023 06:00) (90/42 - 144/74)  BP(mean): 91 (16 Jan 2023 06:00) (56 - 110)  RR: 16 (16 Jan 2023 06:00) (11 - 21)  SpO2: 100% (16 Jan 2023 06:00) (97% - 100%)    Parameters below as of 16 Jan 2023 02:00  Patient On (Oxygen Delivery Method): room air      Gen: NAD  Resp: Nonlabored  R UE:    dressing c/d/i  moving fingers  Tenderness distal tips of index and thumb decreased today  No tenderness to palpation proximally by elbow, no pain with elbow ROM, no proximal redness                             7.7    10.93 )-----------( 436      ( 16 Jan 2023 04:10 )             25.1     01-15    135  |  94<L>  |  11  ----------------------------<  163<H>  4.0   |  25  |  3.46<H>    Ca    9.1      15 Sudeep 2023 01:50  Phos  3.9     01-16  Mg     2.10     01-16              A/P: 47y y/o Male 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 Repeat I+D on 1/14        - OR today for repeat I&D   -Pain control/analgesia  -DVT ppx - Venodynes/HSQ  -FU AM Labs  -Non-weight bearing right upper extremity in bulky dressing  -Daily dressing and packing changes BID  -FU nephrology and HD (MWF)  -FU ID recs (vanc, zosyn)  -Appreciate optho recs: outpatient follow-up  -Appreciate SICU level of care, q1HR neurovascular checks   ORTHO PROGRESS NOTE     Pt seen and examined at bedside, denies SOB, CP, Dizziness. N/V/D /HA.  No significant overnight events. Pain well controlled currently    Vital Signs Last 24 Hrs  T(C): 36.4 (16 Jan 2023 04:00), Max: 37.3 (15 Sudeep 2023 16:00)  T(F): 97.6 (16 Jan 2023 04:00), Max: 99.1 (15 Sudeep 2023 16:00)  HR: 63 (16 Jan 2023 06:00) (63 - 80)  BP: 134/72 (16 Jan 2023 06:00) (90/42 - 144/74)  BP(mean): 91 (16 Jan 2023 06:00) (56 - 110)  RR: 16 (16 Jan 2023 06:00) (11 - 21)  SpO2: 100% (16 Jan 2023 06:00) (97% - 100%)    Parameters below as of 16 Jan 2023 02:00  Patient On (Oxygen Delivery Method): room air      Gen: NAD  Resp: Nonlabored  R UE:    dressing c/d/i  moving fingers  Tenderness distal tips of index and thumb decreased today  No tenderness to palpation proximally by elbow, no pain with elbow ROM, no proximal redness                             7.7    10.93 )-----------( 436      ( 16 Jan 2023 04:10 )             25.1     01-15    135  |  94<L>  |  11  ----------------------------<  163<H>  4.0   |  25  |  3.46<H>    Ca    9.1      15 Sudeep 2023 01:50  Phos  3.9     01-16  Mg     2.10     01-16              A/P: 47y y/o Male 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 Repeat I+D on 1/14        - OR today for repeat I&D   -Pain control/analgesia  -DVT ppx - Venodynes/HSQ  -FU AM Labs  -Non-weight bearing right upper extremity in bulky dressing  -Daily dressing and packing changes BID  -FU nephrology and HD (MWF)  -FU ID recs (vanc, zosyn)  -FU Vasc surgery recs, s/p fistulogram and duplex  -Appreciate optho recs: outpatient follow-up  -Appreciate SICU level of care, q1HR neurovascular checks

## 2023-01-16 NOTE — PROGRESS NOTE ADULT - NUTRITIONAL ASSESSMENT
MEDICATIONS  (STANDING):  acetaminophen     Tablet .. 975 milliGRAM(s) Oral every 8 hours  aspirin  chewable 81 milliGRAM(s) Oral daily  chlorhexidine 2% Cloths 1 Application(s) Topical <User Schedule>  chlorhexidine 2% Cloths 1 Application(s) Topical daily  dextrose 5%. 1000 milliLiter(s) (100 mL/Hr) IV Continuous <Continuous>  dextrose 5%. 1000 milliLiter(s) (50 mL/Hr) IV Continuous <Continuous>  dextrose 5%. 1000 milliLiter(s) (100 mL/Hr) IV Continuous <Continuous>  dextrose 5%. 1000 milliLiter(s) (50 mL/Hr) IV Continuous <Continuous>  dextrose 50% Injectable 25 Gram(s) IV Push once  dextrose 50% Injectable 12.5 Gram(s) IV Push once  dextrose 50% Injectable 25 Gram(s) IV Push once  dextrose 50% Injectable 25 Gram(s) IV Push once  dextrose 50% Injectable 12.5 Gram(s) IV Push once  dextrose 50% Injectable 25 Gram(s) IV Push once  dorzolamide 2%/timolol 0.5% Ophthalmic Solution 1 Drop(s) Left EYE two times a day  epoetin deidre-epbx (RETACRIT) Injectable 15360 Unit(s) IV Push <User Schedule>  famotidine    Tablet 20 milliGRAM(s) Oral every 12 hours  glucagon  Injectable 1 milliGRAM(s) IntraMuscular once  glucagon  Injectable 1 milliGRAM(s) IntraMuscular once  insulin glargine Injectable (LANTUS) 5 Unit(s) SubCutaneous at bedtime  insulin lispro (ADMELOG) corrective regimen sliding scale   SubCutaneous three times a day before meals  lactated ringers. 1000 milliLiter(s) (75 mL/Hr) IV Continuous <Continuous>  midodrine. 10 milliGRAM(s) Oral three times a day  multivitamin 1 Tablet(s) Oral daily  mupirocin 2% Ointment 1 Application(s) Both Nostrils two times a day  piperacillin/tazobactam IVPB.. 3.375 Gram(s) IV Intermittent every 12 hours

## 2023-01-16 NOTE — PROGRESS NOTE ADULT - ASSESSMENT
Patient is a 48 y/o male PMH DM2, Bilateral BKA, ESRD (on HD MWF), last dialyzed yesterday transferred from [facility] emergently for evaluation of evaluation of right finger swelling/pain. Patient reports history of right finger pain after he had a fall one year ago. Patient had a wound on her second/middle finger who he was seeing a hand surgeon for outpatient but unable ultimately to continue seeing due to insurance issues. Patient reports his middle finger developed increased swelling and became black in color about two weeks ago. Denies fevers. Patient transferred to [facility] for further evaluation and emergent OR. Patient received broad spectrum Abx of Zosyn/vanco/clindamycin at [place]. Interpretor phone used for translation with patient. ID# [id number]         septic workup and ID evaluation,send blood and urine cx,serial lactate levels,monitor vitals closley,ivfs hydration,monitor urine output and renal profile,iv abx as per id cons    CHRONIC KIDNEY DISEASE, STAGE 5:   Serum creatinine is stable at 5.2 , approximating a GFR of *** ml/min.   There is no progression.  No uremic symptoms. No evidence of  worsening  Anemia. Fluid status stable.   Will continue to avoid nephrotoxic drugs.  Patient remains asymptomatic.  Continue current therapy.    BP monitoring,continue current antihypertensive meds, low salt diet,followup with PMD in 1-2 weeks      ANEMIA PLAN:  Anemia of chronic disease:  Well controlled by epo  H and H subtherapeutic .  We will continue epo aiming for a HCT of 32-36 %.   We will monitor Iron stores, B12 and RBC folate .  epoetin deidre-epbx (RETACRIT) Injectable 53774 Unit(s) IV Push     Accuchecks monitoring and insulin sliding scale coverage, no concentrated sweets diet, serial labs and f/up with PMD, monitor HB A 1 C every 3-4 mnth

## 2023-01-16 NOTE — PROGRESS NOTE ADULT - SUBJECTIVE AND OBJECTIVE BOX
SICU Daily Progress Note  =====================================================  Interval/Overnight Events:  -No acute events  -Plan to RTOR  today for I&D and possible closure    HPI:      Allergies: No Known Drug Allergies  [place] (Rash)      MEDICATIONS:   --------------------------------------------------------------------------------------  Neurologic Medications  acetaminophen     Tablet .. 975 milliGRAM(s) Oral every 8 hours  HYDROmorphone  Injectable 1 milliGRAM(s) IV Push every 3 hours PRN Severe Pain (7 - 10)  oxyCODONE    IR 10 milliGRAM(s) Oral every 4 hours PRN Severe Pain (7 - 10)  oxyCODONE    IR 5 milliGRAM(s) Oral every 4 hours PRN Moderate Pain (4 - 6)    Respiratory Medications    Cardiovascular Medications  midodrine. 10 milliGRAM(s) Oral three times a day    Gastrointestinal Medications  aluminum hydroxide/magnesium hydroxide/simethicone Suspension 30 milliLiter(s) Oral four times a day PRN Indigestion  bisacodyl Suppository 10 milliGRAM(s) Rectal daily PRN If no bowel movement  magnesium hydroxide Suspension 30 milliLiter(s) Oral daily PRN Constipation  multivitamin 1 Tablet(s) Oral daily    Genitourinary Medications    Hematologic/Oncologic Medications  aspirin  chewable 81 milliGRAM(s) Oral daily  epoetin deidre-epbx (RETACRIT) Injectable 10284 Unit(s) IV Push <User Schedule>    Antimicrobial/Immunologic Medications  piperacillin/tazobactam IVPB.. 3.375 Gram(s) IV Intermittent every 12 hours    Endocrine/Metabolic Medications  dextrose 50% Injectable 25 Gram(s) IV Push once  dextrose 50% Injectable 25 Gram(s) IV Push once  insulin glargine Injectable (LANTUS) 8 Unit(s) SubCutaneous at bedtime  insulin lispro (ADMELOG) corrective regimen sliding scale   SubCutaneous three times a day before meals    Topical/Other Medications  chlorhexidine 2% Cloths 1 Application(s) Topical <User Schedule>  dorzolamide 2%/timolol 0.5% Ophthalmic Solution 1 Drop(s) Left EYE two times a day  mupirocin 2% Ointment 1 Application(s) Both Nostrils two times a day        VITAL SIGNS, INS/OUTS (last 24 hours):  --------------------------------------------------------------------------------------  T(C): 37.1 (01-16-23 @ 00:00), Max: 37.3 (01-15-23 @ 16:00)  HR: 66 (01-16-23 @ 00:00) (63 - 83)  BP: 135/67 (01-16-23 @ 00:00) (90/42 - 144/74)  BP(mean): 87 (01-16-23 @ 00:00) (56 - 110)  ABP: --  ABP(mean): --  RR: 15 (01-16-23 @ 00:00) (11 - 21)  SpO2: 97% (01-16-23 @ 00:00) (97% - 100%)  Wt(kg): --  CVP(mm Hg): --  CI: --  CAPILLARY BLOOD GLUCOSE      POCT Blood Glucose.: 268 mg/dL (15 Sudeep 2023 21:30)  POCT Blood Glucose.: 178 mg/dL (15 Sudeep 2023 15:33)  POCT Blood Glucose.: 177 mg/dL (15 Sudeep 2023 11:37)  POCT Blood Glucose.: 240 mg/dL (15 Sudeep 2023 07:47)   N/A      01-14 @ 07:01  -  01-15 @ 07:00  --------------------------------------------------------  IN:    IV PiggyBack: 100 mL    Lactated Ringers: 225 mL    Oral Fluid: 660 mL  Total IN: 985 mL    OUT:    Voided (mL): 0 mL  Total OUT: 0 mL    Total NET: 985 mL      01-15 @ 07:01  -  01-16 @ 01:00  --------------------------------------------------------  IN:    Oral Fluid: 700 mL  Total IN: 700 mL    OUT:  Total OUT: 0 mL    Total NET: 700 mL    --------------------------------------------------------------------------------------    EXAM  NEUROLOGY  Exam: Normal, in no acute distress.    HEENT  Exam: Normocephalic, atraumatic.    RESPIRATORY  Exam: Normal expansion / effort.     CARDIOVASCULAR  Exam: S1, S2.  Regular rate and rhythm.    GI/NUTRITION  Exam: Abdomen soft, Non-tender, Non-distended.  Current Diet: Regular diet    VASCULAR  Exam: Right upper extremity AV fistula with palpable thrill.    MUSCULOSKELETAL  Exam: Right upper extremity dressing clean, dry and intact.  Bilateral BKAs.  All extremities moving spontaneously without limitations.      METABOLIC/FLUIDS/ELECTROLYTES  multivitamin 1 Tablet(s) Oral daily      HEMATOLOGIC  [x] VTE Prophylaxis: aspirin  chewable 81 milliGRAM(s) Oral daily    Transfusions:	[] PRBC	[] Platelets		[] FFP	[] Cryoprecipitate    INFECTIOUS DISEASE  Antimicrobials/Immunologic Medications:  epoetin deidre-epbx (RETACRIT) Injectable 37691 Unit(s) IV Push <User Schedule>  piperacillin/tazobactam IVPB.. 3.375 Gram(s) IV Intermittent every 12 hours    Day #      of     ***    Tubes/Lines/Drains  ***  [x] Peripheral IV  [] Central Venous Line     	[] R	[] L	[] IJ	[] Fem	[] SC	Date Placed:   [] Arterial Line		[] R	[] L	[] Fem	[] Rad	[] Ax	Date Placed:   [] PICC		[] Midline		[] Mediport  [] Urinary Catheter		Date Placed:   [x] Necessity of urinary, arterial, and venous catheters discussed    LABS  --------------------------------------------------------------------------------------  ((Insert SICU Labs here))***  --------------------------------------------------------------------------------------    OTHER LABORATORY:     IMAGING STUDIES:   CXR:     ASSESSMENT:  47 year old male with a PMHx of DM2, bilateral BKAs and ESRD (on HD - M / W / F) who was transferred from [facility] emergently for evaluation of right finger swelling / pain.  Patient was found to have steal syndrome.  Patient is now S/P revision of arteriovenous dialysis fistula by vascular surgery on 1/13/23.  Patient is also S/P right hand / forearm I&D by ortho on 1/14/23.      PLAN:    NEUROLOGY:  - Continue with Tylenol, Oxycodone and Dilaudid for pain control  - Consider PCA pump if pain remains uncontrolled    RESPIRATORY:  - No active issues  - Saturating well on room air  - Continuous pulse oximetry  - Maintain O2 saturation >92%    CARDIOVASCULAR:  - Hypotensive at baseline  - Continue with home dose Midodrine 10mg TID  - Keep MAP >65    GI / NUTRITION:  - Regular diet  - Bowel regimen with milk of magnesium and Dulcolax    RENAL / GENITOURINARY:  - HD as scheduled per nephrology  - Monitor electrolytes and replete PRN  - Continue to monitor strict ins and outs q1 hour    HEMATOLOGIC:  - Hemoglobin and hematocrit stable  - Continue with Aspirin  - VTE prophylaxis with Heparin subcutaneous    INFECTIOUS DISEASE:  - Currently afebrile with leukocytosis  - Continue with IV Zosyn    ENDOCRINOLOGY:  - Monitor fingersticks before meals and at bedtime  - Continue with insulin sliding scale and Lantus 8 units hospitals    Disposition: SICU  --------------------------------------------------------------------------------------    Critical Care Diagnoses:  -------------------------------------------------------------------------------------- SICU Daily Progress Note  =====================================================  Interval/Overnight Events:  -No acute events  -Plan to RTOR  today for I&D and possible closure    HPI:      Allergies: No Known Drug Allergies  [place] (Rash)      MEDICATIONS:   --------------------------------------------------------------------------------------  Neurologic Medications  acetaminophen     Tablet .. 975 milliGRAM(s) Oral every 8 hours  HYDROmorphone  Injectable 1 milliGRAM(s) IV Push every 3 hours PRN Severe Pain (7 - 10)  oxyCODONE    IR 10 milliGRAM(s) Oral every 4 hours PRN Severe Pain (7 - 10)  oxyCODONE    IR 5 milliGRAM(s) Oral every 4 hours PRN Moderate Pain (4 - 6)    Respiratory Medications    Cardiovascular Medications  midodrine. 10 milliGRAM(s) Oral three times a day    Gastrointestinal Medications  aluminum hydroxide/magnesium hydroxide/simethicone Suspension 30 milliLiter(s) Oral four times a day PRN Indigestion  bisacodyl Suppository 10 milliGRAM(s) Rectal daily PRN If no bowel movement  magnesium hydroxide Suspension 30 milliLiter(s) Oral daily PRN Constipation  multivitamin 1 Tablet(s) Oral daily    Genitourinary Medications    Hematologic/Oncologic Medications  aspirin  chewable 81 milliGRAM(s) Oral daily  epoetin deidre-epbx (RETACRIT) Injectable 61430 Unit(s) IV Push <User Schedule>    Antimicrobial/Immunologic Medications  piperacillin/tazobactam IVPB.. 3.375 Gram(s) IV Intermittent every 12 hours    Endocrine/Metabolic Medications  dextrose 50% Injectable 25 Gram(s) IV Push once  dextrose 50% Injectable 25 Gram(s) IV Push once  insulin glargine Injectable (LANTUS) 8 Unit(s) SubCutaneous at bedtime  insulin lispro (ADMELOG) corrective regimen sliding scale   SubCutaneous three times a day before meals    Topical/Other Medications  chlorhexidine 2% Cloths 1 Application(s) Topical <User Schedule>  dorzolamide 2%/timolol 0.5% Ophthalmic Solution 1 Drop(s) Left EYE two times a day  mupirocin 2% Ointment 1 Application(s) Both Nostrils two times a day        VITAL SIGNS, INS/OUTS (last 24 hours):  --------------------------------------------------------------------------------------  T(C): 37.1 (01-16-23 @ 00:00), Max: 37.3 (01-15-23 @ 16:00)  HR: 66 (01-16-23 @ 00:00) (63 - 83)  BP: 135/67 (01-16-23 @ 00:00) (90/42 - 144/74)  BP(mean): 87 (01-16-23 @ 00:00) (56 - 110)  ABP: --  ABP(mean): --  RR: 15 (01-16-23 @ 00:00) (11 - 21)  SpO2: 97% (01-16-23 @ 00:00) (97% - 100%)  Wt(kg): --  CVP(mm Hg): --  CI: --  CAPILLARY BLOOD GLUCOSE      POCT Blood Glucose.: 268 mg/dL (15 Sudeep 2023 21:30)  POCT Blood Glucose.: 178 mg/dL (15 Sudeep 2023 15:33)  POCT Blood Glucose.: 177 mg/dL (15 Sudeep 2023 11:37)  POCT Blood Glucose.: 240 mg/dL (15 Sudeep 2023 07:47)   N/A      01-14 @ 07:01  -  01-15 @ 07:00  --------------------------------------------------------  IN:    IV PiggyBack: 100 mL    Lactated Ringers: 225 mL    Oral Fluid: 660 mL  Total IN: 985 mL    OUT:    Voided (mL): 0 mL  Total OUT: 0 mL    Total NET: 985 mL      01-15 @ 07:01  -  01-16 @ 01:00  --------------------------------------------------------  IN:    Oral Fluid: 700 mL  Total IN: 700 mL    OUT:  Total OUT: 0 mL    Total NET: 700 mL    --------------------------------------------------------------------------------------    EXAM  NEUROLOGY  Exam: Normal, in no acute distress.    HEENT  Exam: Normocephalic, atraumatic.    RESPIRATORY  Exam: Normal expansion / effort.     CARDIOVASCULAR  Exam: S1, S2.  Regular rate and rhythm.    GI/NUTRITION  Exam: Abdomen soft, Non-tender, Non-distended.  Current Diet: Regular diet    VASCULAR  Exam: Right upper extremity AV fistula with palpable thrill.    MUSCULOSKELETAL  Exam: Right upper extremity dressing clean, dry and intact.  Bilateral BKAs.  All extremities moving spontaneously without limitations.      METABOLIC/FLUIDS/ELECTROLYTES  multivitamin 1 Tablet(s) Oral daily      HEMATOLOGIC  [x] VTE Prophylaxis: aspirin  chewable 81 milliGRAM(s) Oral daily    Transfusions:	[] PRBC	[] Platelets		[] FFP	[] Cryoprecipitate    INFECTIOUS DISEASE  Antimicrobials/Immunologic Medications:  epoetin deidre-epbx (RETACRIT) Injectable 92158 Unit(s) IV Push <User Schedule>  piperacillin/tazobactam IVPB.. 3.375 Gram(s) IV Intermittent every 12 hours    Day #      of     ***    Tubes/Lines/Drains  ***  [x] Peripheral IV  [] Central Venous Line     	[] R	[] L	[] IJ	[] Fem	[] SC	Date Placed:   [] Arterial Line		[] R	[] L	[] Fem	[] Rad	[] Ax	Date Placed:   [] PICC		[] Midline		[] Mediport  [] Urinary Catheter		Date Placed:   [x] Necessity of urinary, arterial, and venous catheters discussed    LABS  --------------------------------------------------------------------------------------  CBC Full  -  ( 16 Jan 2023 04:10 )  WBC Count : 10.93 K/uL  RBC Count : 2.68 M/uL  Hemoglobin : 7.7 g/dL  Hematocrit : 25.1 %  Platelet Count - Automated : 436 K/uL  Mean Cell Volume : 93.7 fL  Mean Cell Hemoglobin : 28.7 pg  Mean Cell Hemoglobin Concentration : 30.7 gm/dL  Auto Neutrophil # : x  Auto Lymphocyte # : x  Auto Monocyte # : x  Auto Eosinophil # : x  Auto Basophil # : x  Auto Neutrophil % : x  Auto Lymphocyte % : x  Auto Monocyte % : x  Auto Eosinophil % : x  Auto Basophil % : x    01-15    135  |  94<L>  |  11  ----------------------------<  163<H>  4.0   |  25  |  3.46<H>    Ca    9.1      15 Sudeep 2023 01:50  Phos  3.9     01-16  Mg     2.10     01-16        PT/INR - ( 16 Jan 2023 04:10 )   PT: 13.2 sec;   INR: 1.14 ratio         PTT - ( 16 Jan 2023 04:10 )  PTT:27.4 sec    --------------------------------------------------------------------------------------    OTHER LABORATORY:     IMAGING STUDIES:   CXR:     ASSESSMENT:  47 year old male with a PMHx of DM2, bilateral BKAs and ESRD (on HD - M / W / F) who was transferred from [facility] emergently for evaluation of right finger swelling / pain.  Patient was found to have steal syndrome.  Patient is now S/P revision of arteriovenous dialysis fistula by vascular surgery on 1/13/23.  Patient is also S/P right hand / forearm I&D by ortho on 1/14/23.      PLAN:    NEUROLOGY:  - Continue with Tylenol, Oxycodone and Dilaudid for pain control  - Consider PCA pump if pain remains uncontrolled    RESPIRATORY:  - No active issues  - Saturating well on room air  - Continuous pulse oximetry  - Maintain O2 saturation >92%    CARDIOVASCULAR:  - Hypotensive at baseline  - Continue with home dose Midodrine 10mg TID  - Keep MAP >65  - duplex pending r/o hand steal syndrome    GI / NUTRITION:  - Regular diet, NPO pmn for OR  - Bowel regimen with milk of magnesium and Dulcolax    RENAL / GENITOURINARY:  - HD as scheduled per nephrology  - Monitor electrolytes and replete PRN  - Continue to monitor strict ins and outs q1 hour    HEMATOLOGIC:  - Hemoglobin and hematocrit stable  - Continue with Aspirin  - VTE prophylaxis with Heparin subcutaneous    INFECTIOUS DISEASE:  - Currently afebrile with leukocytosis, WBC downtrending  - Continue with IV Zosyn  - Plan RTOR today for I&D    ENDOCRINOLOGY:  - Monitor fingersticks  - Continue with insulin sliding scale and Lantus 8 units qHS    Disposition: SICU  --------------------------------------------------------------------------------------    Critical Care Diagnoses:  -------------------------------------------------------------------------------------- SICU Daily Progress Note  =====================================================  Interval/Overnight Events:  -No acute events  -Plan to RTOR  today for I&D and possible closure    HPI:      Allergies: No Known Drug Allergies  [place] (Rash)      MEDICATIONS:   --------------------------------------------------------------------------------------  Neurologic Medications  acetaminophen     Tablet .. 975 milliGRAM(s) Oral every 8 hours  HYDROmorphone  Injectable 1 milliGRAM(s) IV Push every 3 hours PRN Severe Pain (7 - 10)  oxyCODONE    IR 10 milliGRAM(s) Oral every 4 hours PRN Severe Pain (7 - 10)  oxyCODONE    IR 5 milliGRAM(s) Oral every 4 hours PRN Moderate Pain (4 - 6)    Respiratory Medications    Cardiovascular Medications  midodrine. 10 milliGRAM(s) Oral three times a day    Gastrointestinal Medications  aluminum hydroxide/magnesium hydroxide/simethicone Suspension 30 milliLiter(s) Oral four times a day PRN Indigestion  bisacodyl Suppository 10 milliGRAM(s) Rectal daily PRN If no bowel movement  magnesium hydroxide Suspension 30 milliLiter(s) Oral daily PRN Constipation  multivitamin 1 Tablet(s) Oral daily    Genitourinary Medications    Hematologic/Oncologic Medications  aspirin  chewable 81 milliGRAM(s) Oral daily  epoetin deidre-epbx (RETACRIT) Injectable 68324 Unit(s) IV Push <User Schedule>    Antimicrobial/Immunologic Medications  piperacillin/tazobactam IVPB.. 3.375 Gram(s) IV Intermittent every 12 hours    Endocrine/Metabolic Medications  dextrose 50% Injectable 25 Gram(s) IV Push once  dextrose 50% Injectable 25 Gram(s) IV Push once  insulin glargine Injectable (LANTUS) 8 Unit(s) SubCutaneous at bedtime  insulin lispro (ADMELOG) corrective regimen sliding scale   SubCutaneous three times a day before meals    Topical/Other Medications  chlorhexidine 2% Cloths 1 Application(s) Topical <User Schedule>  dorzolamide 2%/timolol 0.5% Ophthalmic Solution 1 Drop(s) Left EYE two times a day  mupirocin 2% Ointment 1 Application(s) Both Nostrils two times a day        VITAL SIGNS, INS/OUTS (last 24 hours):  --------------------------------------------------------------------------------------  T(C): 37.1 (01-16-23 @ 00:00), Max: 37.3 (01-15-23 @ 16:00)  HR: 66 (01-16-23 @ 00:00) (63 - 83)  BP: 135/67 (01-16-23 @ 00:00) (90/42 - 144/74)  BP(mean): 87 (01-16-23 @ 00:00) (56 - 110)  ABP: --  ABP(mean): --  RR: 15 (01-16-23 @ 00:00) (11 - 21)  SpO2: 97% (01-16-23 @ 00:00) (97% - 100%)  Wt(kg): --  CVP(mm Hg): --  CI: --  CAPILLARY BLOOD GLUCOSE      POCT Blood Glucose.: 268 mg/dL (15 Sudeep 2023 21:30)  POCT Blood Glucose.: 178 mg/dL (15 Sudeep 2023 15:33)  POCT Blood Glucose.: 177 mg/dL (15 Sudeep 2023 11:37)  POCT Blood Glucose.: 240 mg/dL (15 Sudeep 2023 07:47)   N/A      01-14 @ 07:01  -  01-15 @ 07:00  --------------------------------------------------------  IN:    IV PiggyBack: 100 mL    Lactated Ringers: 225 mL    Oral Fluid: 660 mL  Total IN: 985 mL    OUT:    Voided (mL): 0 mL  Total OUT: 0 mL    Total NET: 985 mL      01-15 @ 07:01  -  01-16 @ 01:00  --------------------------------------------------------  IN:    Oral Fluid: 700 mL  Total IN: 700 mL    OUT:  Total OUT: 0 mL    Total NET: 700 mL    --------------------------------------------------------------------------------------    EXAM  NEUROLOGY  Exam: Normal, in no acute distress.    HEENT  Exam: Normocephalic, atraumatic.    RESPIRATORY  Exam: Normal expansion / effort.     CARDIOVASCULAR  Exam: S1, S2.  Regular rate and rhythm.    GI/NUTRITION  Exam: Abdomen soft, Non-tender, Non-distended.  Current Diet: Regular diet    VASCULAR  Exam: Right upper extremity AV fistula with palpable thrill.    MUSCULOSKELETAL  Exam: Right upper extremity dressing clean, dry and intact.  Bilateral BKAs.  All extremities moving spontaneously without limitations.      METABOLIC/FLUIDS/ELECTROLYTES  multivitamin 1 Tablet(s) Oral daily      HEMATOLOGIC  [x] VTE Prophylaxis: aspirin  chewable 81 milliGRAM(s) Oral daily    Transfusions:	[] PRBC	[] Platelets		[] FFP	[] Cryoprecipitate    INFECTIOUS DISEASE  Antimicrobials/Immunologic Medications:  epoetin deidre-epbx (RETACRIT) Injectable 31184 Unit(s) IV Push <User Schedule>  piperacillin/tazobactam IVPB.. 3.375 Gram(s) IV Intermittent every 12 hours    Day #      of     ***    Tubes/Lines/Drains  ***  [x] Peripheral IV  [] Central Venous Line     	[] R	[] L	[] IJ	[] Fem	[] SC	Date Placed:   [] Arterial Line		[] R	[] L	[] Fem	[] Rad	[] Ax	Date Placed:   [] PICC		[] Midline		[] Mediport  [] Urinary Catheter		Date Placed:   [x] Necessity of urinary, arterial, and venous catheters discussed    LABS  --------------------------------------------------------------------------------------  CBC Full  -  ( 16 Jan 2023 04:10 )  WBC Count : 10.93 K/uL  RBC Count : 2.68 M/uL  Hemoglobin : 7.7 g/dL  Hematocrit : 25.1 %  Platelet Count - Automated : 436 K/uL  Mean Cell Volume : 93.7 fL  Mean Cell Hemoglobin : 28.7 pg  Mean Cell Hemoglobin Concentration : 30.7 gm/dL  Auto Neutrophil # : x  Auto Lymphocyte # : x  Auto Monocyte # : x  Auto Eosinophil # : x  Auto Basophil # : x  Auto Neutrophil % : x  Auto Lymphocyte % : x  Auto Monocyte % : x  Auto Eosinophil % : x  Auto Basophil % : x    01-15    135  |  94<L>  |  11  ----------------------------<  163<H>  4.0   |  25  |  3.46<H>    Ca    9.1      15 Sudeep 2023 01:50  Phos  3.9     01-16  Mg     2.10     01-16        PT/INR - ( 16 Jan 2023 04:10 )   PT: 13.2 sec;   INR: 1.14 ratio         PTT - ( 16 Jan 2023 04:10 )  PTT:27.4 sec    --------------------------------------------------------------------------------------    OTHER LABORATORY:     IMAGING STUDIES:   CXR:

## 2023-01-16 NOTE — PROGRESS NOTE ADULT - SUBJECTIVE AND OBJECTIVE BOX
SURGERY  Pager: #[phone]    INTERVAL EVENTS/SUBJECTIVE: No acute events overnight. Patient seen and examined on AM rounds.     ______________________________________________  OBJECTIVE:   T(C): 36.5 (01-16-23 @ 08:00), Max: 37.3 (01-15-23 @ 16:00)  HR: 68 (01-16-23 @ 12:00) (58 - 79)  BP: 111/61 (01-16-23 @ 12:00) (100/53 - 144/74)  RR: 15 (01-16-23 @ 12:00) (10 - 21)  SpO2: 100% (01-16-23 @ 12:00) (97% - 100%)  Wt(kg): --  CAPILLARY BLOOD GLUCOSE      POCT Blood Glucose.: 141 mg/dL (16 Jan 2023 11:32)  POCT Blood Glucose.: 217 mg/dL (16 Jan 2023 05:56)  POCT Blood Glucose.: 268 mg/dL (15 Sudeep 2023 21:30)  POCT Blood Glucose.: 178 mg/dL (15 Sudeep 2023 15:33)    I&O's Detail    15 Sudeep 2023 07:01  -  16 Jan 2023 07:00  --------------------------------------------------------  IN:    Oral Fluid: 700 mL  Total IN: 700 mL    OUT:  Total OUT: 0 mL    Total NET: 700 mL          General: Appears well, NAD. Non toxic appearing. Eating breakfast  Abdomen: soft, nontender, nondistended, no rebound or guarding  Extremities: RUE in ACE bandage with fingers missing. Brachiobasilic fistula underneath dressing. Aquacel dressing over RLE wound where GSV harvested from, c/d/i no serous leakage.   ______________________________________________  LABS:  CBC Full  -  ( 16 Jan 2023 04:10 )  WBC Count : 10.93 K/uL  RBC Count : 2.68 M/uL  Hemoglobin : 7.7 g/dL  Hematocrit : 25.1 %  Platelet Count - Automated : 436 K/uL  Mean Cell Volume : 93.7 fL  Mean Cell Hemoglobin : 28.7 pg  Mean Cell Hemoglobin Concentration : 30.7 gm/dL  Auto Neutrophil # : x  Auto Lymphocyte # : x  Auto Monocyte # : x  Auto Eosinophil # : x  Auto Basophil # : x  Auto Neutrophil % : x  Auto Lymphocyte % : x  Auto Monocyte % : x  Auto Eosinophil % : x  Auto Basophil % : x    01-16    135  |  94<L>  |  26<H>  ----------------------------<  207<H>  3.9   |  26  |  6.58<H>    Ca    8.8      16 Jan 2023 04:10  Phos  3.9     01-16  Mg     2.10     01-16      _____________________________________________  RADIOLOGY:

## 2023-01-16 NOTE — PROGRESS NOTE ADULT - ASSESSMENT
ASSESSMENT:  47 year old male with a PMHx of DM2, bilateral BKAs and ESRD (on HD - M / W / F) who was transferred from [facility] emergently for evaluation of right finger swelling / pain.  Patient was found to have steal syndrome.  Patient is now S/P revision of arteriovenous dialysis fistula by vascular surgery on 1/13/23.  Patient is also S/P right hand / forearm I&D by ortho on 1/14/23. Pending RTOR for repeat I&D with orthopedics on 1/16.       PLAN:    NEUROLOGY:  - Continue with Tylenol, Oxycodone and Dilaudid for pain control      RESPIRATORY:  - No active issues  - Saturating well on room air  - Continuous pulse oximetry  - Maintain O2 saturation >92%    CARDIOVASCULAR:  - Hypotensive at baseline  - Continue with home dose Midodrine 10mg TID  - Keep MAP >65  - pending repeat ppg and duplex of AVF     GI / NUTRITION:  - Regular diet, NPO at midnight for OR  - Bowel regimen with milk of magnesium and Dulcolax      RENAL / GENITOURINARY:  - HD as scheduled per nephrology, tentatively planning for HD this evening   - Monitor electrolytes and replete PRN  - Continue to monitor strict ins and outs q1 hour    HEMATOLOGIC:  - H&H 7.7/25.1  - Per orthopedic service, 2U pRBC on hold   - Continue with Aspirin  - VTE prophylaxis with HSQ    INFECTIOUS DISEASE:  - Currently afebrile with leukocytosis, WBC downtrending  - Continue with IV Zosyn  - Plan RTOR today for I&D    ENDOCRINOLOGY:  - Monitor fingersticks  - Continue with insulin sliding scale and Lantus 8 units qHS    Disposition: SICU  --------------------------------------------------------------------------------------    Critical Care Diagnoses:  --------------------------------------------------------------------------------------

## 2023-01-16 NOTE — PROGRESS NOTE ADULT - ASSESSMENT
47M with steal syndrome s/p AVF proximalization of arterial inflow using right GSV.    - Please Repeat ppg and duplex ultrasound of AVF (specifically indicate volume rate for fistula)  - can use AVF as needed  - keep right thigh dressing in place x 4days  - hand surgery per ortho  - Rest of care per primary    Vascular Surgery  #[id number]

## 2023-01-16 NOTE — CHART NOTE - NSCHARTNOTEFT_GEN_A_CORE
ORTHO PROGRESS NOTE     Pt seen and examined at bedside, denies SOB, CP, Dizziness. N/V/D /HA.  . Pain well controlled.    Vital Signs Last 24 Hrs  T(C): 36.4 (17 Jan 2023 00:00), Max: 37.2 (16 Jan 2023 16:50)  T(F): 97.5 (17 Jan 2023 00:00), Max: 98.9 (16 Jan 2023 16:50)  HR: 60 (17 Jan 2023 00:00) (58 - 75)  BP: 88/54 (17 Jan 2023 00:00) (88/54 - 143/67)  BP(mean): 65 (17 Jan 2023 00:00) (65 - 97)  RR: 14 (17 Jan 2023 00:00) (10 - 21)  SpO2: 100% (17 Jan 2023 00:00) (97% - 100%)    Parameters below as of 17 Jan 2023 00:00  Patient On (Oxygen Delivery Method): room air        Gen: NAD, alert and oriented  Resp: Unlabored breathing  RLE: Oozing from index finger, dressing intact       SILT DP/SP/ Ivis/Saph/tib       5/5 EHL 5/5 FHL 5/5 TA 5/5 Gastroc 5/5 IP        DP+,        soft compartments, no calf ttp,       Labs:  CBC Full  -  ( 16 Jan 2023 23:33 )  WBC Count : 9.55 K/uL  RBC Count : 2.77 M/uL  Hemoglobin : 8.1 g/dL  Hematocrit : 25.6 %  Platelet Count - Automated : 427 K/uL  Mean Cell Volume : 92.4 fL  Mean Cell Hemoglobin : 29.2 pg  Mean Cell Hemoglobin Concentration : 31.6 gm/dL  Auto Neutrophil # : x  Auto Lymphocyte # : x  Auto Monocyte # : x  Auto Eosinophil # : x  Auto Basophil # : x  Auto Neutrophil % : x  Auto Lymphocyte % : x  Auto Monocyte % : x  Auto Eosinophil % : x  Auto Basophil % : x      01-16    136  |  96<L>  |  26<H>  ----------------------------<  159<H>  3.7   |  25  |  6.17<H>    Ca    8.7      16 Jan 2023 23:33  Phos  3.8     01-16  Mg     2.10     01-16        AA/P: 47y y/o Male 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 Repeat I+D on 1/14 and 1/16        - Plan OR 1/19  -Pain control/analgesia  -DVT ppx - Venodynes/HSQ  -FU AM Labs  -Non-weight bearing right upper extremity in bulky dressing  -Daily dressing and packing changes BID  -FU nephrology and HD (MWF)  -FU ID recs (vanc, zosyn)  -FU Vasc surgery recs, s/p fistulogram and duplex  -Appreciate optho recs: outpatient follow-up  -Appreciate SICU level of care, q1HR neurovascular checks ORTHO PROGRESS NOTE     Pt seen and examined at bedside, denies SOB, CP, Dizziness. N/V/D /HA.  . Pain well controlled.    Vital Signs Last 24 Hrs  T(C): 36.4 (17 Jan 2023 00:00), Max: 37.2 (16 Jan 2023 16:50)  T(F): 97.5 (17 Jan 2023 00:00), Max: 98.9 (16 Jan 2023 16:50)  HR: 60 (17 Jan 2023 00:00) (58 - 75)  BP: 88/54 (17 Jan 2023 00:00) (88/54 - 143/67)  BP(mean): 65 (17 Jan 2023 00:00) (65 - 97)  RR: 14 (17 Jan 2023 00:00) (10 - 21)  SpO2: 100% (17 Jan 2023 00:00) (97% - 100%)    Parameters below as of 17 Jan 2023 00:00  Patient On (Oxygen Delivery Method): room air    Gen: NAD  Resp: Nonlabored  R UE:    Oozing from index finger  moving fingers  Tenderness distal tips of fingers  Rest of hand WWP        Labs:  CBC Full  -  ( 16 Jan 2023 23:33 )  WBC Count : 9.55 K/uL  RBC Count : 2.77 M/uL  Hemoglobin : 8.1 g/dL  Hematocrit : 25.6 %  Platelet Count - Automated : 427 K/uL  Mean Cell Volume : 92.4 fL  Mean Cell Hemoglobin : 29.2 pg  Mean Cell Hemoglobin Concentration : 31.6 gm/dL  Auto Neutrophil # : x  Auto Lymphocyte # : x  Auto Monocyte # : x  Auto Eosinophil # : x  Auto Basophil # : x  Auto Neutrophil % : x  Auto Lymphocyte % : x  Auto Monocyte % : x  Auto Eosinophil % : x  Auto Basophil % : x      01-16    136  |  96<L>  |  26<H>  ----------------------------<  159<H>  3.7   |  25  |  6.17<H>    Ca    8.7      16 Jan 2023 23:33  Phos  3.8     01-16  Mg     2.10     01-16        AA/P: 47y y/o Male 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 Repeat I+D on 1/14 and 1/16        - Plan OR 1/19  -Pain control/analgesia  -DVT ppx - Venodynes/HSQ  -FU AM Labs  -Non-weight bearing right upper extremity in bulky dressing  -Daily dressing and packing changes BID  -FU nephrology and HD ([facility])  -FU ID recs (vanc, zosyn)  -FU Vasc surgery recs, s/p fistulogram and duplex  -Appreciate optho recs: outpatient follow-up  -Appreciate SICU level of care, q1HR neurovascular checks

## 2023-01-17 LAB
ANION GAP SERPL CALC-SCNC: 11 MMOL/L — SIGNIFICANT CHANGE UP (ref 7–14)
ANION GAP SERPL CALC-SCNC: 15 MMOL/L — HIGH (ref 7–14)
BUN SERPL-MCNC: 26 MG/DL — HIGH (ref 7–23)
BUN SERPL-MCNC: 33 MG/DL — HIGH (ref 7–23)
CALCIUM SERPL-MCNC: 8.7 MG/DL — SIGNIFICANT CHANGE UP (ref 8.4–10.5)
CALCIUM SERPL-MCNC: 8.8 MG/DL — SIGNIFICANT CHANGE UP (ref 8.4–10.5)
CHLORIDE SERPL-SCNC: 96 MMOL/L — LOW (ref 98–107)
CHLORIDE SERPL-SCNC: 97 MMOL/L — LOW (ref 98–107)
CO2 SERPL-SCNC: 25 MMOL/L — SIGNIFICANT CHANGE UP (ref 22–31)
CO2 SERPL-SCNC: 27 MMOL/L — SIGNIFICANT CHANGE UP (ref 22–31)
CREAT SERPL-MCNC: 6.17 MG/DL — HIGH (ref 0.5–1.3)
CREAT SERPL-MCNC: 6.71 MG/DL — HIGH (ref 0.5–1.3)
EGFR: 10 ML/MIN/1.73M2 — LOW
EGFR: 9 ML/MIN/1.73M2 — LOW
GLUCOSE BLDC GLUCOMTR-MCNC: 121 MG/DL — HIGH (ref 70–99)
GLUCOSE BLDC GLUCOMTR-MCNC: 148 MG/DL — HIGH (ref 70–99)
GLUCOSE BLDC GLUCOMTR-MCNC: 156 MG/DL — HIGH (ref 70–99)
GLUCOSE BLDC GLUCOMTR-MCNC: 157 MG/DL — HIGH (ref 70–99)
GLUCOSE BLDC GLUCOMTR-MCNC: 159 MG/DL — HIGH (ref 70–99)
GLUCOSE BLDC GLUCOMTR-MCNC: 244 MG/DL — HIGH (ref 70–99)
GLUCOSE BLDC GLUCOMTR-MCNC: 251 MG/DL — HIGH (ref 70–99)
GLUCOSE BLDC GLUCOMTR-MCNC: 262 MG/DL — HIGH (ref 70–99)
GLUCOSE BLDC GLUCOMTR-MCNC: 273 MG/DL — HIGH (ref 70–99)
GLUCOSE BLDC GLUCOMTR-MCNC: 99 MG/DL — SIGNIFICANT CHANGE UP (ref 70–99)
GLUCOSE SERPL-MCNC: 159 MG/DL — HIGH (ref 70–99)
GLUCOSE SERPL-MCNC: 270 MG/DL — HIGH (ref 70–99)
HCT VFR BLD CALC: 23.3 % — LOW (ref 39–50)
HCT VFR BLD CALC: 24.2 % — LOW (ref 39–50)
HCT VFR BLD CALC: 26.6 % — LOW (ref 39–50)
HGB BLD-MCNC: 7.5 G/DL — LOW (ref 13–17)
HGB BLD-MCNC: 8.1 G/DL — LOW (ref 13–17)
HGB BLD-MCNC: 8.7 G/DL — LOW (ref 13–17)
MAGNESIUM SERPL-MCNC: 2.1 MG/DL — SIGNIFICANT CHANGE UP (ref 1.6–2.6)
MAGNESIUM SERPL-MCNC: 2.2 MG/DL — SIGNIFICANT CHANGE UP (ref 1.6–2.6)
MCHC RBC-ENTMCNC: 29.3 PG — SIGNIFICANT CHANGE UP (ref 27–34)
MCHC RBC-ENTMCNC: 29.5 PG — SIGNIFICANT CHANGE UP (ref 27–34)
MCHC RBC-ENTMCNC: 30 PG — SIGNIFICANT CHANGE UP (ref 27–34)
MCHC RBC-ENTMCNC: 32.2 GM/DL — SIGNIFICANT CHANGE UP (ref 32–36)
MCHC RBC-ENTMCNC: 32.7 GM/DL — SIGNIFICANT CHANGE UP (ref 32–36)
MCHC RBC-ENTMCNC: 33.5 GM/DL — SIGNIFICANT CHANGE UP (ref 32–36)
MCV RBC AUTO: 87.7 FL — SIGNIFICANT CHANGE UP (ref 80–100)
MCV RBC AUTO: 90.2 FL — SIGNIFICANT CHANGE UP (ref 80–100)
MCV RBC AUTO: 93.2 FL — SIGNIFICANT CHANGE UP (ref 80–100)
NRBC # BLD: 0 /100 WBCS — SIGNIFICANT CHANGE UP (ref 0–0)
NRBC # FLD: 0 K/UL — SIGNIFICANT CHANGE UP (ref 0–0)
PHOSPHATE SERPL-MCNC: 3.8 MG/DL — SIGNIFICANT CHANGE UP (ref 2.5–4.5)
PHOSPHATE SERPL-MCNC: 4.6 MG/DL — HIGH (ref 2.5–4.5)
PLATELET # BLD AUTO: 369 K/UL — SIGNIFICANT CHANGE UP (ref 150–400)
PLATELET # BLD AUTO: 382 K/UL — SIGNIFICANT CHANGE UP (ref 150–400)
PLATELET # BLD AUTO: 393 K/UL — SIGNIFICANT CHANGE UP (ref 150–400)
POTASSIUM SERPL-MCNC: 3.7 MMOL/L — SIGNIFICANT CHANGE UP (ref 3.5–5.3)
POTASSIUM SERPL-MCNC: 4.3 MMOL/L — SIGNIFICANT CHANGE UP (ref 3.5–5.3)
POTASSIUM SERPL-SCNC: 3.7 MMOL/L — SIGNIFICANT CHANGE UP (ref 3.5–5.3)
POTASSIUM SERPL-SCNC: 4.3 MMOL/L — SIGNIFICANT CHANGE UP (ref 3.5–5.3)
RBC # BLD: 2.5 M/UL — LOW (ref 4.2–5.8)
RBC # BLD: 2.76 M/UL — LOW (ref 4.2–5.8)
RBC # BLD: 2.95 M/UL — LOW (ref 4.2–5.8)
RBC # FLD: 15.8 % — HIGH (ref 10.3–14.5)
RBC # FLD: 15.9 % — HIGH (ref 10.3–14.5)
RBC # FLD: 16.9 % — HIGH (ref 10.3–14.5)
SODIUM SERPL-SCNC: 135 MMOL/L — SIGNIFICANT CHANGE UP (ref 135–145)
SODIUM SERPL-SCNC: 136 MMOL/L — SIGNIFICANT CHANGE UP (ref 135–145)
WBC # BLD: 10.31 K/UL — SIGNIFICANT CHANGE UP (ref 3.8–10.5)
WBC # BLD: 10.5 K/UL — SIGNIFICANT CHANGE UP (ref 3.8–10.5)
WBC # BLD: 11.54 K/UL — HIGH (ref 3.8–10.5)
WBC # FLD AUTO: 10.31 K/UL — SIGNIFICANT CHANGE UP (ref 3.8–10.5)
WBC # FLD AUTO: 10.5 K/UL — SIGNIFICANT CHANGE UP (ref 3.8–10.5)
WBC # FLD AUTO: 11.54 K/UL — HIGH (ref 3.8–10.5)

## 2023-01-17 PROCEDURE — 99291 CRITICAL CARE FIRST HOUR: CPT | Mod: 25,24

## 2023-01-17 PROCEDURE — 36620 INSERTION CATHETER ARTERY: CPT | Mod: GC

## 2023-01-17 PROCEDURE — 99232 SBSQ HOSP IP/OBS MODERATE 35: CPT

## 2023-01-17 RX ORDER — MIDAZOLAM HYDROCHLORIDE 1 MG/ML
1 INJECTION, SOLUTION INTRAMUSCULAR; INTRAVENOUS ONCE
Refills: 0 | Status: DISCONTINUED | OUTPATIENT
Start: 2023-01-17 | End: 2023-01-17

## 2023-01-17 RX ORDER — DESMOPRESSIN ACETATE 0.1 MG/1
21 TABLET ORAL ONCE
Refills: 0 | Status: COMPLETED | OUTPATIENT
Start: 2023-01-17 | End: 2023-01-17

## 2023-01-17 RX ORDER — DEXTROSE 50 % IN WATER 50 %
50 SYRINGE (ML) INTRAVENOUS ONCE
Refills: 0 | Status: COMPLETED | OUTPATIENT
Start: 2023-01-17 | End: 2023-01-17

## 2023-01-17 RX ORDER — MIDODRINE HYDROCHLORIDE 2.5 MG/1
25 TABLET ORAL
Refills: 0 | Status: DISCONTINUED | OUTPATIENT
Start: 2023-01-17 | End: 2023-01-18

## 2023-01-17 RX ORDER — SODIUM CHLORIDE 9 MG/ML
500 INJECTION, SOLUTION INTRAVENOUS ONCE
Refills: 0 | Status: COMPLETED | OUTPATIENT
Start: 2023-01-17 | End: 2023-01-17

## 2023-01-17 RX ORDER — INSULIN GLARGINE 100 [IU]/ML
10 INJECTION, SOLUTION SUBCUTANEOUS AT BEDTIME
Refills: 0 | Status: DISCONTINUED | OUTPATIENT
Start: 2023-01-17 | End: 2023-01-17

## 2023-01-17 RX ORDER — LACTULOSE 10 G/15ML
10 SOLUTION ORAL DAILY
Refills: 0 | Status: DISCONTINUED | OUTPATIENT
Start: 2023-01-17 | End: 2023-02-24

## 2023-01-17 RX ORDER — KETAMINE HYDROCHLORIDE 100 MG/ML
20 INJECTION INTRAMUSCULAR; INTRAVENOUS ONCE
Refills: 0 | Status: DISCONTINUED | OUTPATIENT
Start: 2023-01-17 | End: 2023-01-17

## 2023-01-17 RX ORDER — INSULIN HUMAN 100 [IU]/ML
3 INJECTION, SOLUTION SUBCUTANEOUS
Qty: 100 | Refills: 0 | Status: DISCONTINUED | OUTPATIENT
Start: 2023-01-17 | End: 2023-01-17

## 2023-01-17 RX ORDER — INSULIN LISPRO 100/ML
3 VIAL (ML) SUBCUTANEOUS
Refills: 0 | Status: DISCONTINUED | OUTPATIENT
Start: 2023-01-17 | End: 2023-01-17

## 2023-01-17 RX ORDER — CALCIUM GLUCONATE 100 MG/ML
2 VIAL (ML) INTRAVENOUS ONCE
Refills: 0 | Status: COMPLETED | OUTPATIENT
Start: 2023-01-17 | End: 2023-01-17

## 2023-01-17 RX ORDER — INSULIN HUMAN 100 [IU]/ML
0.5 INJECTION, SOLUTION SUBCUTANEOUS
Qty: 100 | Refills: 0 | Status: DISCONTINUED | OUTPATIENT
Start: 2023-01-17 | End: 2023-01-18

## 2023-01-17 RX ORDER — PHENYLEPHRINE HYDROCHLORIDE 10 MG/ML
0.1 INJECTION INTRAVENOUS
Qty: 40 | Refills: 0 | Status: DISCONTINUED | OUTPATIENT
Start: 2023-01-17 | End: 2023-01-17

## 2023-01-17 RX ORDER — KETAMINE HYDROCHLORIDE 100 MG/ML
25 INJECTION INTRAMUSCULAR; INTRAVENOUS ONCE
Refills: 0 | Status: DISCONTINUED | OUTPATIENT
Start: 2023-01-17 | End: 2023-01-17

## 2023-01-17 RX ORDER — INSULIN HUMAN 100 [IU]/ML
6 INJECTION, SOLUTION SUBCUTANEOUS ONCE
Refills: 0 | Status: COMPLETED | OUTPATIENT
Start: 2023-01-17 | End: 2023-01-17

## 2023-01-17 RX ADMIN — MUPIROCIN 1 APPLICATION(S): 20 OINTMENT TOPICAL at 05:46

## 2023-01-17 RX ADMIN — Medication 3 UNIT(S): at 16:03

## 2023-01-17 RX ADMIN — INSULIN HUMAN 6 UNIT(S): 100 INJECTION, SOLUTION SUBCUTANEOUS at 16:54

## 2023-01-17 RX ADMIN — Medication 200 GRAM(S): at 10:39

## 2023-01-17 RX ADMIN — OXYCODONE HYDROCHLORIDE 10 MILLIGRAM(S): 5 TABLET ORAL at 12:31

## 2023-01-17 RX ADMIN — Medication 3: at 12:27

## 2023-01-17 RX ADMIN — Medication 81 MILLIGRAM(S): at 12:26

## 2023-01-17 RX ADMIN — MIDODRINE HYDROCHLORIDE 10 MILLIGRAM(S): 2.5 TABLET ORAL at 05:45

## 2023-01-17 RX ADMIN — KETAMINE HYDROCHLORIDE 20 MILLIGRAM(S): 100 INJECTION INTRAMUSCULAR; INTRAVENOUS at 20:59

## 2023-01-17 RX ADMIN — HYDROMORPHONE HYDROCHLORIDE 1 MILLIGRAM(S): 2 INJECTION INTRAMUSCULAR; INTRAVENOUS; SUBCUTANEOUS at 03:21

## 2023-01-17 RX ADMIN — HYDROMORPHONE HYDROCHLORIDE 1 MILLIGRAM(S): 2 INJECTION INTRAMUSCULAR; INTRAVENOUS; SUBCUTANEOUS at 03:35

## 2023-01-17 RX ADMIN — Medication 3: at 16:03

## 2023-01-17 RX ADMIN — DORZOLAMIDE HYDROCHLORIDE TIMOLOL MALEATE 1 DROP(S): 20; 5 SOLUTION/ DROPS OPHTHALMIC at 18:20

## 2023-01-17 RX ADMIN — PIPERACILLIN AND TAZOBACTAM 25 GRAM(S): 4; .5 INJECTION, POWDER, LYOPHILIZED, FOR SOLUTION INTRAVENOUS at 05:45

## 2023-01-17 RX ADMIN — HEPARIN SODIUM 5000 UNIT(S): 5000 INJECTION INTRAVENOUS; SUBCUTANEOUS at 05:45

## 2023-01-17 RX ADMIN — MIDODRINE HYDROCHLORIDE 10 MILLIGRAM(S): 2.5 TABLET ORAL at 16:55

## 2023-01-17 RX ADMIN — MIDAZOLAM HYDROCHLORIDE 1 MILLIGRAM(S): 1 INJECTION, SOLUTION INTRAMUSCULAR; INTRAVENOUS at 20:59

## 2023-01-17 RX ADMIN — Medication 50 MILLILITER(S): at 19:45

## 2023-01-17 RX ADMIN — MIDODRINE HYDROCHLORIDE 10 MILLIGRAM(S): 2.5 TABLET ORAL at 12:26

## 2023-01-17 RX ADMIN — MIDAZOLAM HYDROCHLORIDE 1 MILLIGRAM(S): 1 INJECTION, SOLUTION INTRAMUSCULAR; INTRAVENOUS at 09:38

## 2023-01-17 RX ADMIN — INSULIN HUMAN 3 UNIT(S)/HR: 100 INJECTION, SOLUTION SUBCUTANEOUS at 16:55

## 2023-01-17 RX ADMIN — CHLORHEXIDINE GLUCONATE 1 APPLICATION(S): 213 SOLUTION TOPICAL at 05:47

## 2023-01-17 RX ADMIN — OXYCODONE HYDROCHLORIDE 10 MILLIGRAM(S): 5 TABLET ORAL at 13:01

## 2023-01-17 RX ADMIN — Medication 975 MILLIGRAM(S): at 22:29

## 2023-01-17 RX ADMIN — Medication 975 MILLIGRAM(S): at 06:01

## 2023-01-17 RX ADMIN — DESMOPRESSIN ACETATE 21 MICROGRAM(S): 0.1 TABLET ORAL at 10:46

## 2023-01-17 RX ADMIN — Medication 2: at 07:32

## 2023-01-17 RX ADMIN — Medication 975 MILLIGRAM(S): at 05:44

## 2023-01-17 RX ADMIN — HEPARIN SODIUM 5000 UNIT(S): 5000 INJECTION INTRAVENOUS; SUBCUTANEOUS at 14:02

## 2023-01-17 RX ADMIN — PIPERACILLIN AND TAZOBACTAM 25 GRAM(S): 4; .5 INJECTION, POWDER, LYOPHILIZED, FOR SOLUTION INTRAVENOUS at 18:20

## 2023-01-17 RX ADMIN — DORZOLAMIDE HYDROCHLORIDE TIMOLOL MALEATE 1 DROP(S): 20; 5 SOLUTION/ DROPS OPHTHALMIC at 05:46

## 2023-01-17 RX ADMIN — KETAMINE HYDROCHLORIDE 20 MILLIGRAM(S): 100 INJECTION INTRAMUSCULAR; INTRAVENOUS at 10:38

## 2023-01-17 RX ADMIN — MIDAZOLAM HYDROCHLORIDE 1 MILLIGRAM(S): 1 INJECTION, SOLUTION INTRAMUSCULAR; INTRAVENOUS at 21:23

## 2023-01-17 RX ADMIN — HEPARIN SODIUM 5000 UNIT(S): 5000 INJECTION INTRAVENOUS; SUBCUTANEOUS at 22:28

## 2023-01-17 RX ADMIN — Medication 1 TABLET(S): at 12:26

## 2023-01-17 RX ADMIN — SODIUM CHLORIDE 1000 MILLILITER(S): 9 INJECTION, SOLUTION INTRAVENOUS at 04:30

## 2023-01-17 RX ADMIN — MIDAZOLAM HYDROCHLORIDE 1 MILLIGRAM(S): 1 INJECTION, SOLUTION INTRAMUSCULAR; INTRAVENOUS at 10:39

## 2023-01-17 RX ADMIN — Medication 975 MILLIGRAM(S): at 14:02

## 2023-01-17 NOTE — PROCEDURE NOTE - NSPROCDETAILS_GEN_ALL_CORE
guidewire recovered/lumen(s) aspirated and flushed/sterile dressing applied/sterile technique, catheter placed/ultrasound guidance with use of sterile gel and probe cove
location identified, draped/prepped, sterile technique used, needle inserted/introduced/positive blood return obtained via catheter/connected to a pressurized flush line/hemostasis with direct pressure, dressing applied/all materials/supplies accounted for at end of procedure

## 2023-01-17 NOTE — PROGRESS NOTE ADULT - SUBJECTIVE AND OBJECTIVE BOX
Patient is a 47y Male whom presented to the hospital with esrd on hd     PAST MEDICAL & SURGICAL HISTORY:      MEDICATIONS  (STANDING):  dextrose 5%. 1000 milliLiter(s) (100 mL/Hr) IV Continuous <Continuous>  dextrose 5%. 1000 milliLiter(s) (50 mL/Hr) IV Continuous <Continuous>  dextrose 50% Injectable 25 Gram(s) IV Push once  dextrose 50% Injectable 25 Gram(s) IV Push once  dextrose 50% Injectable 12.5 Gram(s) IV Push once  glucagon  Injectable 1 milliGRAM(s) IntraMuscular once  insulin lispro (ADMELOG) corrective regimen sliding scale   SubCutaneous every 6 hours  pantoprazole    Tablet 40 milliGRAM(s) Oral daily  polyethylene glycol 3350 17 Gram(s) Oral daily  senna 2 Tablet(s) Oral at bedtime      Allergies    No Known Allergies    Intolerances        SOCIAL HISTORY:  Denies ETOh,Smoking,     FAMILY HISTORY:      REVIEW OF SYSTEMS:    CONSTITUTIONAL: No weakness, fevers or chills  NECK: No pain or stiffness  RESPIRATORY: No cough, wheezing, hemoptysis; No shortness of breath  CARDIOVASCULAR: No chest pain or palpitations  GASTROINTESTINAL: No abdominal or epigastric pain. No nausea, vomiting,                                        8.1    11.54 )-----------( 369      ( 17 Jan 2023 16:00 )             24.2       CBC Full  -  ( 17 Jan 2023 16:00 )  WBC Count : 11.54 K/uL  RBC Count : 2.76 M/uL  Hemoglobin : 8.1 g/dL  Hematocrit : 24.2 %  Platelet Count - Automated : 369 K/uL  Mean Cell Volume : 87.7 fL  Mean Cell Hemoglobin : 29.3 pg  Mean Cell Hemoglobin Concentration : 33.5 gm/dL  Auto Neutrophil # : x  Auto Lymphocyte # : x  Auto Monocyte # : x  Auto Eosinophil # : x  Auto Basophil # : x  Auto Neutrophil % : x  Auto Lymphocyte % : x  Auto Monocyte % : x  Auto Eosinophil % : x  Auto Basophil % : x      01-17    135  |  97<L>  |  33<H>  ----------------------------<  270<H>  4.3   |  27  |  6.71<H>    Ca    8.8      17 Jan 2023 16:00  Phos  4.6     01-17  Mg     2.20     01-17        CAPILLARY BLOOD GLUCOSE      POCT Blood Glucose.: 156 mg/dL (17 Jan 2023 20:23)  POCT Blood Glucose.: 99 mg/dL (17 Jan 2023 19:17)  POCT Blood Glucose.: 121 mg/dL (17 Jan 2023 18:17)  POCT Blood Glucose.: 273 mg/dL (17 Jan 2023 16:00)  POCT Blood Glucose.: 262 mg/dL (17 Jan 2023 12:21)  POCT Blood Glucose.: 244 mg/dL (17 Jan 2023 07:31)  POCT Blood Glucose.: 251 mg/dL (17 Jan 2023 07:29)  POCT Blood Glucose.: 137 mg/dL (16 Jan 2023 21:20)      Vital Signs Last 24 Hrs  T(C): 36.2 (17 Jan 2023 20:00), Max: 36.4 (16 Jan 2023 23:10)  T(F): 97.2 (17 Jan 2023 20:00), Max: 97.5 (16 Jan 2023 23:10)  HR: 78 (17 Jan 2023 20:00) (59 - 78)  BP: 91/52 (17 Jan 2023 10:00) (64/34 - 143/67)  BP(mean): 64 (17 Jan 2023 10:00) (45 - 93)  RR: 20 (17 Jan 2023 20:00) (12 - 21)  SpO2: 100% (17 Jan 2023 20:00) (98% - 100%)    Parameters below as of 17 Jan 2023 20:00  Patient On (Oxygen Delivery Method): room air            PT/INR - ( 16 Jan 2023 23:33 )   PT: 13.0 sec;   INR: 1.12 ratio         PTT - ( 16 Jan 2023 23:33 )  PTT:29.9 sec                                                       PHYSICAL EXAM:    Constitutional: NAD  HEENT: conjunctive   clear   Neck:  No JVD  Respiratory: CTAB  Cardiovascular: S1 and S2  Gastrointestinal: BS+, soft, NT/ND

## 2023-01-17 NOTE — DIETITIAN INITIAL EVALUATION ADULT - ADD RECOMMEND
1) Monitor weights, labs, BM's, skin integrity, p.o. intake, FS; 2) Diet changed to Renal Replacement, Consistent Carbohydrate diet; 3) Defer to dialysis center RDN for f/u and further diet instructions as needed.

## 2023-01-17 NOTE — DIETITIAN INITIAL EVALUATION ADULT - ORAL INTAKE PTA/DIET HISTORY
Met w/pt who provided nutrition hx.  Pt states his appetite is fairly good.  He relates that he is allergic to turkey, he denies chewing/swallowing difficulty, or recent wt change (dry wt reported as 70kg).  He receives HD 3x/w at a facility in [place].  He sees a RDN routinely there.  Pt on a renal, consistent carbohydrate diet. He states that he does eat a lot of cheese and acknowledges the salt and phosphorous content of cheese.  He takes a Vitamin D supplement while at home.   He performs FS at home 1-2x/d.  As per nursing flowsheet, pt consuming approximatley 51-75% of meals in hospital.

## 2023-01-17 NOTE — DIETITIAN INITIAL EVALUATION ADULT - OTHER INFO
47 year old male with a PMHx of DM2, bilateral BKAs and ESRD (on HD - M / W / F) who was transferred from [facility] emergently for evaluation of right finger swelling / pain.  Patient was found to have steal syndrome.  Patient is now S/P revision of arteriovenous dialysis fistula by vascular surgery on 1/13/23.  Patient is also S/P right hand / forearm I&D by ortho on 1/14/23. RTOR for repeat I&D with orthopedics on 1/16.

## 2023-01-17 NOTE — DIETITIAN INITIAL EVALUATION ADULT - NS FNS DIET ORDER
Diet, Consistent Carbohydrate w/Evening Snack:   For patients receiving Renal Replacement - No Protein Restr, No Conc K, No Conc Phos, Low Sodium (RENAL) (01-17-23 @ 12:36)

## 2023-01-17 NOTE — PROGRESS NOTE ADULT - SUBJECTIVE AND OBJECTIVE BOX
Follow Up: finger infection    Interval History/ROS: s/p OR I&D a few times since last week. Pain is 1-2 out of 10. No diarrhea or chills or fevers.     Allergies  No Known Drug Allergies  Turkey (Rash)        ANTIMICROBIALS:  piperacillin/tazobactam IVPB.. 3.375 every 12 hours      OTHER MEDS:  acetaminophen     Tablet .. 975 milliGRAM(s) Oral every 8 hours  aspirin  chewable 81 milliGRAM(s) Oral daily  bisacodyl Suppository 10 milliGRAM(s) Rectal daily PRN  chlorhexidine 2% Cloths 1 Application(s) Topical <User Schedule>  dorzolamide 2%/timolol 0.5% Ophthalmic Solution 1 Drop(s) Left EYE two times a day  epoetin deidre-epbx (RETACRIT) Injectable 32947 Unit(s) IV Push <User Schedule>  heparin   Injectable 5000 Unit(s) SubCutaneous every 8 hours  HYDROmorphone  Injectable 1 milliGRAM(s) IV Push every 3 hours PRN  insulin regular Infusion 3 Unit(s)/Hr IV Continuous <Continuous>  lactulose Syrup 10 Gram(s) Oral daily PRN  midodrine. 10 milliGRAM(s) Oral three times a day  multivitamin 1 Tablet(s) Oral daily  oxyCODONE    IR 5 milliGRAM(s) Oral every 4 hours PRN  oxyCODONE    IR 10 milliGRAM(s) Oral every 4 hours PRN      Vital Signs Last 24 Hrs  T(C): 36.2 (17 Jan 2023 20:00), Max: 37 (16 Jan 2023 20:00)  T(F): 97.2 (17 Jan 2023 20:00), Max: 98.6 (16 Jan 2023 20:00)  HR: 70 (17 Jan 2023 19:00) (59 - 78)  BP: 91/52 (17 Jan 2023 10:00) (64/34 - 143/67)  BP(mean): 64 (17 Jan 2023 10:00) (45 - 93)  RR: 12 (17 Jan 2023 19:00) (12 - 21)  SpO2: 100% (17 Jan 2023 19:00) (98% - 100%)    Parameters below as of 17 Jan 2023 16:00  Patient On (Oxygen Delivery Method): room air        Physical Exam:  General: non toxic  Cardio: regular rate   Respiratory: nonlabored   abd: nondistended  Musculoskeletal: right hand dressed extensively. bilateral BKA   vascular: no phlebitis   Skin: no rash                          8.1    11.54 )-----------( 369      ( 17 Jan 2023 16:00 )             24.2       01-17    135  |  97<L>  |  33<H>  ----------------------------<  270<H>  4.3   |  27  |  6.71<H>    Ca    8.8      17 Jan 2023 16:00  Phos  4.6     01-17  Mg     2.20     01-17            MICROBIOLOGY:  Culture - Fungal, Other (collected 01-14-23 @ 11:40)  Source: .Other RIGHT FOREARM  Preliminary Report (01-16-23 @ 12:24):    Testing in progress    Culture - Surgical Swab (collected 01-14-23 @ 11:40)  Source: .Surgical Swab RIGHT FOREARM  Final Report (01-16-23 @ 10:29):    Few Escherichia coli    Moderate Streptococcus anginosus    "Susceptibilities not performed"    Few Bacteroides fragilis    "Susceptibilities not performed"  Organism: Escherichia coli (01-16-23 @ 10:29)  Organism: Escherichia coli (01-16-23 @ 10:29)      -  Amikacin: S <=16      -  Amoxicillin/Clavulanic Acid: S <=8/4      -  Ampicillin: S <=8 These ampicillin results predict results for amoxicillin      -  Ampicillin/Sulbactam: S <=4/2 Enterobacter, Klebsiella aerogenes, Citrobacter, and Serratia may develop resistance during prolonged therapy (3-4 days)      -  Aztreonam: S <=4      -  Cefazolin: S <=2 Enterobacter, Klebsiella aerogenes, Citrobacter, and Serratia may develop resistance during prolonged therapy (3-4 days)      -  Cefepime: S <=2      -  Cefoxitin: S <=8      -  Ceftriaxone: S <=1 Enterobacter, Klebsiella aerogenes, Citrobacter, and Serratia may develop resistance during prolonged therapy      -  Ciprofloxacin: S <=0.25      -  Ertapenem: S <=0.5      -  Gentamicin: S <=2      -  Imipenem: S <=1      -  Levofloxacin: S <=0.5      -  Meropenem: S <=1      -  Piperacillin/Tazobactam: S <=8      -  Tobramycin: S <=2      -  Trimethoprim/Sulfamethoxazole: S <=0.5/9.5      Method Type: LOKI    Rapid RVP Result: NotDetec (01-12 @ 18:16)      RADIOLOGY:  Images below reviewed personally  CT Angio Upper Extremity w/ IV Cont, Right (01.10.23 @ 03:18)   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  Evaluation of the brachial artery below the elbow/radial/ulnar arteries   is limited. These vessels are grossly patent. The ulnar artery is   dominant and there is calcification.  Consider duplex ultrasound of the fistula and runoff to the right hand.  Evaluation of the vessels in the hand is limited.  There is soft tissue swelling and gas in the region of the third digit   compatible with an infectious process.

## 2023-01-17 NOTE — PROGRESS NOTE ADULT - SUBJECTIVE AND OBJECTIVE BOX
HISTORY:    24 HOUR EVENTS:    -    NEURO  RASS (if intubated): 		[facility] ICU (if concern for delirium):  Exam:   Meds: acetaminophen     Tablet .. 975 milliGRAM(s) Oral every 8 hours  HYDROmorphone  Injectable 1 milliGRAM(s) IV Push every 3 hours PRN Severe Pain (7 - 10)  oxyCODONE    IR 5 milliGRAM(s) Oral every 4 hours PRN Moderate Pain (4 - 6)  oxyCODONE    IR 10 milliGRAM(s) Oral every 4 hours PRN Severe Pain (7 - 10)      RESPIRATORY  RR: 14 (01-17-23 @ 00:00) (10 - 21)  SpO2: 100% (01-17-23 @ 00:00) (97% - 100%)  Wt(kg): --  Exam:  Mechanical Ventilation:     Meds:     CARDIOVASCULAR  HR: 60 (01-17-23 @ 00:00) (58 - 75)  BP: 88/54 (01-17-23 @ 00:00) (88/54 - 143/67)  BP(mean): 65 (01-17-23 @ 00:00) (65 - 97)  ABP: --  ABP(mean): --  Wt(kg): --  CVP(cm H2O): --      Exam:   Cardiac Rhythm:   Perfusion     [ ]Adequate   [ ]Inadequate  Mentation   [ ]Normal       [ ]Reduced  Extremities  [ ]Warm         [ ]Cool  Volume Status [ ]Hypervolemic [ ]Euvolemic [ ]Hypovolemic  Meds: midodrine. 10 milliGRAM(s) Oral three times a day      GI/NUTRITION  Exam:   Diet:   Meds: aluminum hydroxide/magnesium hydroxide/simethicone Suspension 30 milliLiter(s) Oral four times a day PRN Indigestion  bisacodyl Suppository 10 milliGRAM(s) Rectal daily PRN If no bowel movement  magnesium hydroxide Suspension 30 milliLiter(s) Oral daily PRN Constipation      GENITOURINARY  I&O's Detail    01-15 @ 07:01  -  01-16 @ 07:00  --------------------------------------------------------  IN:    Oral Fluid: 700 mL  Total IN: 700 mL    OUT:  Total OUT: 0 mL    Total NET: 700 mL      01-16 @ 07:01  -  01-17 @ 01:05  --------------------------------------------------------  IN:    Oral Fluid: 120 mL    PRBCs (Packed Red Blood Cells): 325 mL  Total IN: 445 mL    OUT:  Total OUT: 0 mL    Total NET: 445 mL          01-16    136  |  96<L>  |  26<H>  ----------------------------<  159<H>  3.7   |  25  |  6.17<H>    Ca    8.7      16 Jan 2023 23:33  Phos  3.8     01-16  Mg     2.10     01-16      Meds: multivitamin 1 Tablet(s) Oral daily      HEMATOLOGIC  Meds: aspirin  chewable 81 milliGRAM(s) Oral daily  heparin   Injectable 5000 Unit(s) SubCutaneous every 8 hours                          8.1    9.55  )-----------( 427      ( 16 Jan 2023 23:33 )             25.6     PT/INR - ( 16 Jan 2023 23:33 )   PT: 13.0 sec;   INR: 1.12 ratio         PTT - ( 16 Jan 2023 23:33 )  PTT:29.9 sec    INFECTIOUS DISEASES  T(C): 36.4 (01-17-23 @ 00:00), Max: 37.2 (01-16-23 @ 16:50)  Wt(kg): --  WBC Count: 9.55 K/uL (01-16 @ 23:33)  WBC Count: 11.69 K/uL (01-16 @ 17:32)  WBC Count: 10.93 K/uL (01-16 @ 04:10)    Recent Cultures:  Specimen Source: .Surgical Swab RIGHT FOREARM, 01-14 @ 11:40; Results   Few Escherichia coli  Moderate Streptococcus anginosus  "Susceptibilities not performed"  Few Bacteroides fragilis  "Susceptibilities not performed"; Gram Stain: --; Organism: Escherichia coli  Specimen Source: .Surgical Swab RIGHT DORSAL HAND #2, 01-10 @ 18:18; Results   Moderate Escherichia coli  Few Streptococcus anginosus "Susceptibilities not performed"  Few Bacteroides fragilis "Susceptibilities not performed"; Gram Stain: --; Organism: Escherichia coli  Specimen Source: .Surgical Swab RIGHT DORSAL HAND #1, 01-10 @ 18:08; Results   Moderate Escherichia coli  Few Alpha hemolytic strep "Susceptibilities not performed"  Few Bacteroides fragilis "Susceptibilities not performed"  Moderate Streptococcus anginosus "Susceptibilities not performed"; Gram Stain: --; Organism: Escherichia coli  Specimen Source: .Blood Blood-Peripheral, 01-10 @ 16:20; Results   No Growth Final; Gram Stain: --; Organism: --  Specimen Source: .Blood Blood-Peripheral, 01-10 @ 16:10; Results   No Growth Final; Gram Stain: --; Organism: --    Meds: epoetin deidre-epbx (RETACRIT) Injectable 06003 Unit(s) IV Push <User Schedule>  piperacillin/tazobactam IVPB.. 3.375 Gram(s) IV Intermittent every 12 hours      ENDOCRINE  Capillary Blood Glucose    Meds: dextrose 50% Injectable 25 Gram(s) IV Push once  dextrose 50% Injectable 25 Gram(s) IV Push once  insulin glargine Injectable (LANTUS) 8 Unit(s) SubCutaneous at bedtime  insulin lispro (ADMELOG) corrective regimen sliding scale   SubCutaneous three times a day before meals      ACCESS DEVICES:  [ ] Peripheral IV  [ ] Central Venous Line		[ ] R	[ ] L	[ ] IJ	[ ] Fem	[ ] SC	Placed:   [ ] Arterial Line			[ ] R	[ ] L	[ ] Fem	[ ] Rad	[ ] Ax	Placed:   [ ] PICC:					[ ] Mediport  [ ] Urinary Catheter, Date Placed:   [ ] Necessity of urinary, arterial, and venous catheters discussed    OTHER MEDICATIONS:  chlorhexidine 2% Cloths 1 Application(s) Topical <User Schedule>  dorzolamide 2%/timolol 0.5% Ophthalmic Solution 1 Drop(s) Left EYE two times a day  mupirocin 2% Ointment 1 Application(s) Both Nostrils two times a day      IMAGING: HISTORY:    24 HOUR EVENTS:    S/p I+D with Orthopedics  -Significant oozing noted from midline near snuff box between prolenes, dressing soaked  -HD attempted however patient hypotensive to 60's systolic, HD stopped  -1U PRBC ordered, i/s/o suspected blood loss not yet apparent on Crit  -AM CXR ordered    NEURO  RASS (if intubated): 		[facility] ICU (if concern for delirium):  Exam:   Meds: acetaminophen     Tablet .. 975 milliGRAM(s) Oral every 8 hours  HYDROmorphone  Injectable 1 milliGRAM(s) IV Push every 3 hours PRN Severe Pain (7 - 10)  oxyCODONE    IR 5 milliGRAM(s) Oral every 4 hours PRN Moderate Pain (4 - 6)  oxyCODONE    IR 10 milliGRAM(s) Oral every 4 hours PRN Severe Pain (7 - 10)      RESPIRATORY  RR: 14 (01-17-23 @ 00:00) (10 - 21)  SpO2: 100% (01-17-23 @ 00:00) (97% - 100%)  Wt(kg): --  Exam:  Mechanical Ventilation:     Meds:     CARDIOVASCULAR  HR: 60 (01-17-23 @ 00:00) (58 - 75)  BP: 88/54 (01-17-23 @ 00:00) (88/54 - 143/67)  BP(mean): 65 (01-17-23 @ 00:00) (65 - 97)  ABP: --  ABP(mean): --  Wt(kg): --  CVP(cm H2O): --      Exam:   Cardiac Rhythm:   Perfusion     [ ]Adequate   [ ]Inadequate  Mentation   [ ]Normal       [ ]Reduced  Extremities  [ ]Warm         [ ]Cool  Volume Status [ ]Hypervolemic [ ]Euvolemic [ ]Hypovolemic  Meds: midodrine. 10 milliGRAM(s) Oral three times a day      GI/NUTRITION  Exam:   Diet:   Meds: aluminum hydroxide/magnesium hydroxide/simethicone Suspension 30 milliLiter(s) Oral four times a day PRN Indigestion  bisacodyl Suppository 10 milliGRAM(s) Rectal daily PRN If no bowel movement  magnesium hydroxide Suspension 30 milliLiter(s) Oral daily PRN Constipation      GENITOURINARY  I&O's Detail    01-15 @ 07:01  -  01-16 @ 07:00  --------------------------------------------------------  IN:    Oral Fluid: 700 mL  Total IN: 700 mL    OUT:  Total OUT: 0 mL    Total NET: 700 mL      01-16 @ 07:01  -  01-17 @ 01:05  --------------------------------------------------------  IN:    Oral Fluid: 120 mL    PRBCs (Packed Red Blood Cells): 325 mL  Total IN: 445 mL    OUT:  Total OUT: 0 mL    Total NET: 445 mL          01-16    136  |  96<L>  |  26<H>  ----------------------------<  159<H>  3.7   |  25  |  6.17<H>    Ca    8.7      16 Jan 2023 23:33  Phos  3.8     01-16  Mg     2.10     01-16      Meds: multivitamin 1 Tablet(s) Oral daily      HEMATOLOGIC  Meds: aspirin  chewable 81 milliGRAM(s) Oral daily  heparin   Injectable 5000 Unit(s) SubCutaneous every 8 hours                          8.1    9.55  )-----------( 427      ( 16 Jan 2023 23:33 )             25.6     PT/INR - ( 16 Jan 2023 23:33 )   PT: 13.0 sec;   INR: 1.12 ratio         PTT - ( 16 Jan 2023 23:33 )  PTT:29.9 sec    INFECTIOUS DISEASES  T(C): 36.4 (01-17-23 @ 00:00), Max: 37.2 (01-16-23 @ 16:50)  Wt(kg): --  WBC Count: 9.55 K/uL (01-16 @ 23:33)  WBC Count: 11.69 K/uL (01-16 @ 17:32)  WBC Count: 10.93 K/uL (01-16 @ 04:10)    Recent Cultures:  Specimen Source: .Surgical Swab RIGHT FOREARM, 01-14 @ 11:40; Results   Few Escherichia coli  Moderate Streptococcus anginosus  "Susceptibilities not performed"  Few Bacteroides fragilis  "Susceptibilities not performed"; Gram Stain: --; Organism: Escherichia coli  Specimen Source: .Surgical Swab RIGHT DORSAL HAND #2, 01-10 @ 18:18; Results   Moderate Escherichia coli  Few Streptococcus anginosus "Susceptibilities not performed"  Few Bacteroides fragilis "Susceptibilities not performed"; Gram Stain: --; Organism: Escherichia coli  Specimen Source: .Surgical Swab RIGHT DORSAL HAND #1, 01-10 @ 18:08; Results   Moderate Escherichia coli  Few Alpha hemolytic strep "Susceptibilities not performed"  Few Bacteroides fragilis "Susceptibilities not performed"  Moderate Streptococcus anginosus "Susceptibilities not performed"; Gram Stain: --; Organism: Escherichia coli  Specimen Source: .Blood Blood-Peripheral, 01-10 @ 16:20; Results   No Growth Final; Gram Stain: --; Organism: --  Specimen Source: .Blood Blood-Peripheral, 01-10 @ 16:10; Results   No Growth Final; Gram Stain: --; Organism: --    Meds: epoetin deidre-epbx (RETACRIT) Injectable 50201 Unit(s) IV Push <User Schedule>  piperacillin/tazobactam IVPB.. 3.375 Gram(s) IV Intermittent every 12 hours      ENDOCRINE  Capillary Blood Glucose    Meds: dextrose 50% Injectable 25 Gram(s) IV Push once  dextrose 50% Injectable 25 Gram(s) IV Push once  insulin glargine Injectable (LANTUS) 8 Unit(s) SubCutaneous at bedtime  insulin lispro (ADMELOG) corrective regimen sliding scale   SubCutaneous three times a day before meals      ACCESS DEVICES:  [ ] Peripheral IV  [ ] Central Venous Line		[ ] R	[ ] L	[ ] IJ	[ ] Fem	[ ] SC	Placed:   [ ] Arterial Line			[ ] R	[ ] L	[ ] Fem	[ ] Rad	[ ] Ax	Placed:   [ ] PICC:					[ ] Mediport  [ ] Urinary Catheter, Date Placed:   [ ] Necessity of urinary, arterial, and venous catheters discussed    OTHER MEDICATIONS:  chlorhexidine 2% Cloths 1 Application(s) Topical <User Schedule>  dorzolamide 2%/timolol 0.5% Ophthalmic Solution 1 Drop(s) Left EYE two times a day  mupirocin 2% Ointment 1 Application(s) Both Nostrils two times a day      IMAGING: HISTORY:    24 HOUR EVENTS:    S/p I+D with Orthopedics  -Significant oozing noted from midline near snuff box between prolenes, dressing soaked  -Orthopedics team at bedside evaluating  -HD attempted however patient hypotensive to 60's systolic, HD stopped  -1U PRBC ordered, i/s/o suspected blood loss not yet apparent on Crit  -Pressures improved    NEURO  RASS (if intubated): 		[facility] ICU (if concern for delirium):  Exam:   Meds: acetaminophen     Tablet .. 975 milliGRAM(s) Oral every 8 hours  HYDROmorphone  Injectable 1 milliGRAM(s) IV Push every 3 hours PRN Severe Pain (7 - 10)  oxyCODONE    IR 5 milliGRAM(s) Oral every 4 hours PRN Moderate Pain (4 - 6)  oxyCODONE    IR 10 milliGRAM(s) Oral every 4 hours PRN Severe Pain (7 - 10)      RESPIRATORY  RR: 14 (01-17-23 @ 00:00) (10 - 21)  SpO2: 100% (01-17-23 @ 00:00) (97% - 100%)  Wt(kg): --  Exam:  Mechanical Ventilation:     Meds:     CARDIOVASCULAR  HR: 60 (01-17-23 @ 00:00) (58 - 75)  BP: 88/54 (01-17-23 @ 00:00) (88/54 - 143/67)  BP(mean): 65 (01-17-23 @ 00:00) (65 - 97)  ABP: --  ABP(mean): --  Wt(kg): --  CVP(cm H2O): --      Exam:   Cardiac Rhythm:   Perfusion     [ ]Adequate   [ ]Inadequate  Mentation   [ ]Normal       [ ]Reduced  Extremities  [ ]Warm         [ ]Cool  Volume Status [ ]Hypervolemic [ ]Euvolemic [ ]Hypovolemic  Meds: midodrine. 10 milliGRAM(s) Oral three times a day      GI/NUTRITION  Exam:   Diet:   Meds: aluminum hydroxide/magnesium hydroxide/simethicone Suspension 30 milliLiter(s) Oral four times a day PRN Indigestion  bisacodyl Suppository 10 milliGRAM(s) Rectal daily PRN If no bowel movement  magnesium hydroxide Suspension 30 milliLiter(s) Oral daily PRN Constipation      GENITOURINARY  I&O's Detail    01-15 @ 07:01  -  01-16 @ 07:00  --------------------------------------------------------  IN:    Oral Fluid: 700 mL  Total IN: 700 mL    OUT:  Total OUT: 0 mL    Total NET: 700 mL      01-16 @ 07:01  - 01-17 @ 01:05  --------------------------------------------------------  IN:    Oral Fluid: 120 mL    PRBCs (Packed Red Blood Cells): 325 mL  Total IN: 445 mL    OUT:  Total OUT: 0 mL    Total NET: 445 mL          01-16    136  |  96<L>  |  26<H>  ----------------------------<  159<H>  3.7   |  25  |  6.17<H>    Ca    8.7      16 Jan 2023 23:33  Phos  3.8     01-16  Mg     2.10     01-16      Meds: multivitamin 1 Tablet(s) Oral daily      HEMATOLOGIC  Meds: aspirin  chewable 81 milliGRAM(s) Oral daily  heparin   Injectable 5000 Unit(s) SubCutaneous every 8 hours                          8.1    9.55  )-----------( 427      ( 16 Jan 2023 23:33 )             25.6     PT/INR - ( 16 Jan 2023 23:33 )   PT: 13.0 sec;   INR: 1.12 ratio         PTT - ( 16 Jan 2023 23:33 )  PTT:29.9 sec    INFECTIOUS DISEASES  T(C): 36.4 (01-17-23 @ 00:00), Max: 37.2 (01-16-23 @ 16:50)  Wt(kg): --  WBC Count: 9.55 K/uL (01-16 @ 23:33)  WBC Count: 11.69 K/uL (01-16 @ 17:32)  WBC Count: 10.93 K/uL (01-16 @ 04:10)    Recent Cultures:  Specimen Source: .Surgical Swab RIGHT FOREARM, 01-14 @ 11:40; Results   Few Escherichia coli  Moderate Streptococcus anginosus  "Susceptibilities not performed"  Few Bacteroides fragilis  "Susceptibilities not performed"; Gram Stain: --; Organism: Escherichia coli  Specimen Source: .Surgical Swab RIGHT DORSAL HAND #2, 01-10 @ 18:18; Results   Moderate Escherichia coli  Few Streptococcus anginosus "Susceptibilities not performed"  Few Bacteroides fragilis "Susceptibilities not performed"; Gram Stain: --; Organism: Escherichia coli  Specimen Source: .Surgical Swab RIGHT DORSAL HAND #1, 01-10 @ 18:08; Results   Moderate Escherichia coli  Few Alpha hemolytic strep "Susceptibilities not performed"  Few Bacteroides fragilis "Susceptibilities not performed"  Moderate Streptococcus anginosus "Susceptibilities not performed"; Gram Stain: --; Organism: Escherichia coli  Specimen Source: .Blood Blood-Peripheral, 01-10 @ 16:20; Results   No Growth Final; Gram Stain: --; Organism: --  Specimen Source: .Blood Blood-Peripheral, 01-10 @ 16:10; Results   No Growth Final; Gram Stain: --; Organism: --    Meds: epoetin deidre-epbx (RETACRIT) Injectable 40405 Unit(s) IV Push <User Schedule>  piperacillin/tazobactam IVPB.. 3.375 Gram(s) IV Intermittent every 12 hours      ENDOCRINE  Capillary Blood Glucose    Meds: dextrose 50% Injectable 25 Gram(s) IV Push once  dextrose 50% Injectable 25 Gram(s) IV Push once  insulin glargine Injectable (LANTUS) 8 Unit(s) SubCutaneous at bedtime  insulin lispro (ADMELOG) corrective regimen sliding scale   SubCutaneous three times a day before meals      ACCESS DEVICES:  [ ] Peripheral IV  [ ] Central Venous Line		[ ] R	[ ] L	[ ] IJ	[ ] Fem	[ ] SC	Placed:   [ ] Arterial Line			[ ] R	[ ] L	[ ] Fem	[ ] Rad	[ ] Ax	Placed:   [ ] PICC:					[ ] Mediport  [ ] Urinary Catheter, Date Placed:   [ ] Necessity of urinary, arterial, and venous catheters discussed    OTHER MEDICATIONS:  chlorhexidine 2% Cloths 1 Application(s) Topical <User Schedule>  dorzolamide 2%/timolol 0.5% Ophthalmic Solution 1 Drop(s) Left EYE two times a day  mupirocin 2% Ointment 1 Application(s) Both Nostrils two times a day      IMAGING: HISTORY:    24 HOUR EVENTS:    S/p I+D with Orthopedics  -Significant oozing noted from midline near snuff box between prolenes, dressing soaked  -Orthopedics team at bedside evaluating  -HD attempted however patient hypotensive to 60's systolic, HD stopped  -1U PRBC ordered, i/s/o suspected blood loss not yet apparent on Crit  -Pressures improved thereafter  -CVC flushing, but drawback in only one lumen    NEURO  RASS (if intubated): 		[facility] ICU (if concern for delirium):  Exam:   Meds: acetaminophen     Tablet .. 975 milliGRAM(s) Oral every 8 hours  HYDROmorphone  Injectable 1 milliGRAM(s) IV Push every 3 hours PRN Severe Pain (7 - 10)  oxyCODONE    IR 5 milliGRAM(s) Oral every 4 hours PRN Moderate Pain (4 - 6)  oxyCODONE    IR 10 milliGRAM(s) Oral every 4 hours PRN Severe Pain (7 - 10)      RESPIRATORY  RR: 14 (01-17-23 @ 00:00) (10 - 21)  SpO2: 100% (01-17-23 @ 00:00) (97% - 100%)  Wt(kg): --  Exam:  Mechanical Ventilation:     Meds:     CARDIOVASCULAR  HR: 60 (01-17-23 @ 00:00) (58 - 75)  BP: 88/54 (01-17-23 @ 00:00) (88/54 - 143/67)  BP(mean): 65 (01-17-23 @ 00:00) (65 - 97)  ABP: --  ABP(mean): --  Wt(kg): --  CVP(cm H2O): --      Exam:   Cardiac Rhythm:   Perfusion     [ ]Adequate   [ ]Inadequate  Mentation   [ ]Normal       [ ]Reduced  Extremities  [ ]Warm         [ ]Cool  Volume Status [ ]Hypervolemic [ ]Euvolemic [ ]Hypovolemic  Meds: midodrine. 10 milliGRAM(s) Oral three times a day      GI/NUTRITION  Exam:   Diet:   Meds: aluminum hydroxide/magnesium hydroxide/simethicone Suspension 30 milliLiter(s) Oral four times a day PRN Indigestion  bisacodyl Suppository 10 milliGRAM(s) Rectal daily PRN If no bowel movement  magnesium hydroxide Suspension 30 milliLiter(s) Oral daily PRN Constipation      GENITOURINARY  I&O's Detail    01-15 @ 07:01 - 01-16 @ 07:00  --------------------------------------------------------  IN:    Oral Fluid: 700 mL  Total IN: 700 mL    OUT:  Total OUT: 0 mL    Total NET: 700 mL      01-16 @ 07:01  -  01-17 @ 01:05  --------------------------------------------------------  IN:    Oral Fluid: 120 mL    PRBCs (Packed Red Blood Cells): 325 mL  Total IN: 445 mL    OUT:  Total OUT: 0 mL    Total NET: 445 mL          01-16    136  |  96<L>  |  26<H>  ----------------------------<  159<H>  3.7   |  25  |  6.17<H>    Ca    8.7      16 Jan 2023 23:33  Phos  3.8     01-16  Mg     2.10     01-16      Meds: multivitamin 1 Tablet(s) Oral daily      HEMATOLOGIC  Meds: aspirin  chewable 81 milliGRAM(s) Oral daily  heparin   Injectable 5000 Unit(s) SubCutaneous every 8 hours                          8.1    9.55  )-----------( 427      ( 16 Jan 2023 23:33 )             25.6     PT/INR - ( 16 Jan 2023 23:33 )   PT: 13.0 sec;   INR: 1.12 ratio         PTT - ( 16 Jan 2023 23:33 )  PTT:29.9 sec    INFECTIOUS DISEASES  T(C): 36.4 (01-17-23 @ 00:00), Max: 37.2 (01-16-23 @ 16:50)  Wt(kg): --  WBC Count: 9.55 K/uL (01-16 @ 23:33)  WBC Count: 11.69 K/uL (01-16 @ 17:32)  WBC Count: 10.93 K/uL (01-16 @ 04:10)    Recent Cultures:  Specimen Source: .Surgical Swab RIGHT FOREARM, 01-14 @ 11:40; Results   Few Escherichia coli  Moderate Streptococcus anginosus  "Susceptibilities not performed"  Few Bacteroides fragilis  "Susceptibilities not performed"; Gram Stain: --; Organism: Escherichia coli  Specimen Source: .Surgical Swab RIGHT DORSAL HAND #2, 01-10 @ 18:18; Results   Moderate Escherichia coli  Few Streptococcus anginosus "Susceptibilities not performed"  Few Bacteroides fragilis "Susceptibilities not performed"; Gram Stain: --; Organism: Escherichia coli  Specimen Source: .Surgical Swab RIGHT DORSAL HAND #1, 01-10 @ 18:08; Results   Moderate Escherichia coli  Few Alpha hemolytic strep "Susceptibilities not performed"  Few Bacteroides fragilis "Susceptibilities not performed"  Moderate Streptococcus anginosus "Susceptibilities not performed"; Gram Stain: --; Organism: Escherichia coli  Specimen Source: .Blood Blood-Peripheral, 01-10 @ 16:20; Results   No Growth Final; Gram Stain: --; Organism: --  Specimen Source: .Blood Blood-Peripheral, 01-10 @ 16:10; Results   No Growth Final; Gram Stain: --; Organism: --    Meds: epoetin deidre-epbx (RETACRIT) Injectable 28682 Unit(s) IV Push <User Schedule>  piperacillin/tazobactam IVPB.. 3.375 Gram(s) IV Intermittent every 12 hours      ENDOCRINE  Capillary Blood Glucose    Meds: dextrose 50% Injectable 25 Gram(s) IV Push once  dextrose 50% Injectable 25 Gram(s) IV Push once  insulin glargine Injectable (LANTUS) 8 Unit(s) SubCutaneous at bedtime  insulin lispro (ADMELOG) corrective regimen sliding scale   SubCutaneous three times a day before meals      ACCESS DEVICES:  [ ] Peripheral IV  [ ] Central Venous Line		[ ] R	[ ] L	[ ] IJ	[ ] Fem	[ ] SC	Placed:   [ ] Arterial Line			[ ] R	[ ] L	[ ] Fem	[ ] Rad	[ ] Ax	Placed:   [ ] PICC:					[ ] Mediport  [ ] Urinary Catheter, Date Placed:   [ ] Necessity of urinary, arterial, and venous catheters discussed    OTHER MEDICATIONS:  chlorhexidine 2% Cloths 1 Application(s) Topical <User Schedule>  dorzolamide 2%/timolol 0.5% Ophthalmic Solution 1 Drop(s) Left EYE two times a day  mupirocin 2% Ointment 1 Application(s) Both Nostrils two times a day      IMAGING: HISTORY:    24 HOUR EVENTS:    S/p I+D with Orthopedics  -Significant oozing noted from midline near snuff box between prolenes, dressing soaked  -Orthopedics team at bedside evaluating  -HD attempted however patient hypotensive to 60's systolic, HD stopped  -1U PRBC ordered, i/s/o suspected blood loss not yet apparent on Crit  -Pressures improved thereafter  -CVC flushing, but drawback in only one lumen    NEURO  RASS (if intubated): 		[facility] ICU (if concern for delirium):  Exam:   Meds: acetaminophen     Tablet .. 975 milliGRAM(s) Oral every 8 hours  HYDROmorphone  Injectable 1 milliGRAM(s) IV Push every 3 hours PRN Severe Pain (7 - 10)  oxyCODONE    IR 5 milliGRAM(s) Oral every 4 hours PRN Moderate Pain (4 - 6)  oxyCODONE    IR 10 milliGRAM(s) Oral every 4 hours PRN Severe Pain (7 - 10)      RESPIRATORY  RR: 14 (01-17-23 @ 00:00) (10 - 21)  SpO2: 100% (01-17-23 @ 00:00) (97% - 100%)  Wt(kg): --  Exam:  Mechanical Ventilation:     Meds:     CARDIOVASCULAR  HR: 60 (01-17-23 @ 00:00) (58 - 75)  BP: 88/54 (01-17-23 @ 00:00) (88/54 - 143/67)  BP(mean): 65 (01-17-23 @ 00:00) (65 - 97)  ABP: --  ABP(mean): --  Wt(kg): --  CVP(cm H2O): --      Exam:   Cardiac Rhythm:   Perfusion     [ ]Adequate   [ ]Inadequate  Mentation   [ ]Normal       [ ]Reduced  Extremities  [ ]Warm         [ ]Cool  Volume Status [ ]Hypervolemic [ ]Euvolemic [ ]Hypovolemic  Meds: midodrine. 10 milliGRAM(s) Oral three times a day      GI/NUTRITION  Exam:   Diet:   Meds: aluminum hydroxide/magnesium hydroxide/simethicone Suspension 30 milliLiter(s) Oral four times a day PRN Indigestion  bisacodyl Suppository 10 milliGRAM(s) Rectal daily PRN If no bowel movement  magnesium hydroxide Suspension 30 milliLiter(s) Oral daily PRN Constipation      GENITOURINARY  I&O's Detail    01-15 @ 07:01 - 01-16 @ 07:00  --------------------------------------------------------  IN:    Oral Fluid: 700 mL  Total IN: 700 mL    OUT:  Total OUT: 0 mL    Total NET: 700 mL      01-16 @ 07:01  -  01-17 @ 01:05  --------------------------------------------------------  IN:    Oral Fluid: 120 mL    PRBCs (Packed Red Blood Cells): 325 mL  Total IN: 445 mL    OUT:  Total OUT: 0 mL    Total NET: 445 mL          01-16    136  |  96<L>  |  26<H>  ----------------------------<  159<H>  3.7   |  25  |  6.17<H>    Ca    8.7      16 Jan 2023 23:33  Phos  3.8     01-16  Mg     2.10     01-16      Meds: multivitamin 1 Tablet(s) Oral daily      HEMATOLOGIC  Meds: aspirin  chewable 81 milliGRAM(s) Oral daily  heparin   Injectable 5000 Unit(s) SubCutaneous every 8 hours                          8.1    9.55  )-----------( 427      ( 16 Jan 2023 23:33 )             25.6     PT/INR - ( 16 Jan 2023 23:33 )   PT: 13.0 sec;   INR: 1.12 ratio         PTT - ( 16 Jan 2023 23:33 )  PTT:29.9 sec    INFECTIOUS DISEASES  T(C): 36.4 (01-17-23 @ 00:00), Max: 37.2 (01-16-23 @ 16:50)  Wt(kg): --  WBC Count: 9.55 K/uL (01-16 @ 23:33)  WBC Count: 11.69 K/uL (01-16 @ 17:32)  WBC Count: 10.93 K/uL (01-16 @ 04:10)    Recent Cultures:  Specimen Source: .Surgical Swab RIGHT FOREARM, 01-14 @ 11:40; Results   Few Escherichia coli  Moderate Streptococcus anginosus  "Susceptibilities not performed"  Few Bacteroides fragilis  "Susceptibilities not performed"; Gram Stain: --; Organism: Escherichia coli  Specimen Source: .Surgical Swab RIGHT DORSAL HAND #2, 01-10 @ 18:18; Results   Moderate Escherichia coli  Few Streptococcus anginosus "Susceptibilities not performed"  Few Bacteroides fragilis "Susceptibilities not performed"; Gram Stain: --; Organism: Escherichia coli  Specimen Source: .Surgical Swab RIGHT DORSAL HAND #1, 01-10 @ 18:08; Results   Moderate Escherichia coli  Few Alpha hemolytic strep "Susceptibilities not performed"  Few Bacteroides fragilis "Susceptibilities not performed"  Moderate Streptococcus anginosus "Susceptibilities not performed"; Gram Stain: --; Organism: Escherichia coli  Specimen Source: .Blood Blood-Peripheral, 01-10 @ 16:20; Results   No Growth Final; Gram Stain: --; Organism: --  Specimen Source: .Blood Blood-Peripheral, 01-10 @ 16:10; Results   No Growth Final; Gram Stain: --; Organism: --    Meds: epoetin deidre-epbx (RETACRIT) Injectable 15973 Unit(s) IV Push <User Schedule>  piperacillin/tazobactam IVPB.. 3.375 Gram(s) IV Intermittent every 12 hours      ENDOCRINE  Capillary Blood Glucose    Meds: dextrose 50% Injectable 25 Gram(s) IV Push once  dextrose 50% Injectable 25 Gram(s) IV Push once  insulin glargine Injectable (LANTUS) 8 Unit(s) SubCutaneous at bedtime  insulin lispro (ADMELOG) corrective regimen sliding scale   SubCutaneous three times a day before meals    EXAM:   NEUROLOGY  Exam: Normal, in no acute distress.    HEENT  Exam: Normocephalic, atraumatic.    RESPIRATORY  Exam: Normal expansion / effort.     CARDIOVASCULAR  Exam: S1, S2.  Regular rate and rhythm.    GI/NUTRITION  Exam: Abdomen soft, Non-tender, Non-distended.  Current Diet: consistent carb     VASCULAR  Exam: Right upper extremity AV fistula with palpable thrill.    MUSCULOSKELETAL  Exam: Oozing from R hand, with dressing soaked   Bilateral BKAs.  All extremities moving spontaneously without limitations.      ACCESS DEVICES:  [ ] Peripheral IV  [ ] Central Venous Line		[ ] R	[ ] L	[ ] IJ	[x] Fem	[ ] SC	Placed: 1/15/23  [ ] Arterial Line			[ ] R	[ ] L	[ ] Fem	[ ] Rad	[ ] Ax	Placed:   [ ] PICC:					[ ] Mediport  [ ] Urinary Catheter, Date Placed:   [ ] Necessity of urinary, arterial, and venous catheters discussed    OTHER MEDICATIONS:  chlorhexidine 2% Cloths 1 Application(s) Topical <User Schedule>  dorzolamide 2%/timolol 0.5% Ophthalmic Solution 1 Drop(s) Left EYE two times a day  mupirocin 2% Ointment 1 Application(s) Both Nostrils two times a day      IMAGING: HISTORY:    24 HOUR EVENTS:    S/p I+D with Orthopedics  -Significant oozing noted from midline near snuff box between prolenes, dressing soaked  -Orthopedics team at bedside evaluating  -HD attempted however patient hypotensive to 60's systolic, HD stopped  -1U PRBC ordered, i/s/o suspected blood loss not yet apparent on Crit  -Pressures improved thereafter  -CVC flushing, but drawback in only one lumen    NEURO  RASS (if intubated): 		[facility] ICU (if concern for delirium):  Exam:   Meds: acetaminophen     Tablet .. 975 milliGRAM(s) Oral every 8 hours  HYDROmorphone  Injectable 1 milliGRAM(s) IV Push every 3 hours PRN Severe Pain (7 - 10)  oxyCODONE    IR 5 milliGRAM(s) Oral every 4 hours PRN Moderate Pain (4 - 6)  oxyCODONE    IR 10 milliGRAM(s) Oral every 4 hours PRN Severe Pain (7 - 10)      RESPIRATORY  RR: 14 (01-17-23 @ 00:00) (10 - 21)  SpO2: 100% (01-17-23 @ 00:00) (97% - 100%)  Wt(kg): --  Exam:  Mechanical Ventilation:     Meds:     CARDIOVASCULAR  HR: 60 (01-17-23 @ 00:00) (58 - 75)  BP: 88/54 (01-17-23 @ 00:00) (88/54 - 143/67)  BP(mean): 65 (01-17-23 @ 00:00) (65 - 97)  ABP: --  ABP(mean): --  Wt(kg): --  CVP(cm H2O): --      Exam:   Cardiac Rhythm:   Perfusion     [ ]Adequate   [ ]Inadequate  Mentation   [ ]Normal       [ ]Reduced  Extremities  [ ]Warm         [ ]Cool  Volume Status [ ]Hypervolemic [ ]Euvolemic [ ]Hypovolemic  Meds: midodrine. 10 milliGRAM(s) Oral three times a day      GI/NUTRITION  Exam:   Diet:   Meds: aluminum hydroxide/magnesium hydroxide/simethicone Suspension 30 milliLiter(s) Oral four times a day PRN Indigestion  bisacodyl Suppository 10 milliGRAM(s) Rectal daily PRN If no bowel movement  magnesium hydroxide Suspension 30 milliLiter(s) Oral daily PRN Constipation      GENITOURINARY  I&O's Detail    01-15 @ 07:01 - 01-16 @ 07:00  --------------------------------------------------------  IN:    Oral Fluid: 700 mL  Total IN: 700 mL    OUT:  Total OUT: 0 mL    Total NET: 700 mL      01-16 @ 07:01  -  01-17 @ 01:05  --------------------------------------------------------  IN:    Oral Fluid: 120 mL    PRBCs (Packed Red Blood Cells): 325 mL  Total IN: 445 mL    OUT:  Total OUT: 0 mL    Total NET: 445 mL          01-16    136  |  96<L>  |  26<H>  ----------------------------<  159<H>  3.7   |  25  |  6.17<H>    Ca    8.7      16 Jan 2023 23:33  Phos  3.8     01-16  Mg     2.10     01-16      Meds: multivitamin 1 Tablet(s) Oral daily      HEMATOLOGIC  Meds: aspirin  chewable 81 milliGRAM(s) Oral daily  heparin   Injectable 5000 Unit(s) SubCutaneous every 8 hours                          8.1    9.55  )-----------( 427      ( 16 Jan 2023 23:33 )             25.6     PT/INR - ( 16 Jan 2023 23:33 )   PT: 13.0 sec;   INR: 1.12 ratio         PTT - ( 16 Jan 2023 23:33 )  PTT:29.9 sec    INFECTIOUS DISEASES  T(C): 36.4 (01-17-23 @ 00:00), Max: 37.2 (01-16-23 @ 16:50)  Wt(kg): --  WBC Count: 9.55 K/uL (01-16 @ 23:33)  WBC Count: 11.69 K/uL (01-16 @ 17:32)  WBC Count: 10.93 K/uL (01-16 @ 04:10)    Recent Cultures:  Specimen Source: .Surgical Swab RIGHT FOREARM, 01-14 @ 11:40; Results   Few Escherichia coli  Moderate Streptococcus anginosus  "Susceptibilities not performed"  Few Bacteroides fragilis  "Susceptibilities not performed"; Gram Stain: --; Organism: Escherichia coli  Specimen Source: .Surgical Swab RIGHT DORSAL HAND #2, 01-10 @ 18:18; Results   Moderate Escherichia coli  Few Streptococcus anginosus "Susceptibilities not performed"  Few Bacteroides fragilis "Susceptibilities not performed"; Gram Stain: --; Organism: Escherichia coli  Specimen Source: .Surgical Swab RIGHT DORSAL HAND #1, 01-10 @ 18:08; Results   Moderate Escherichia coli  Few Alpha hemolytic strep "Susceptibilities not performed"  Few Bacteroides fragilis "Susceptibilities not performed"  Moderate Streptococcus anginosus "Susceptibilities not performed"; Gram Stain: --; Organism: Escherichia coli  Specimen Source: .Blood Blood-Peripheral, 01-10 @ 16:20; Results   No Growth Final; Gram Stain: --; Organism: --  Specimen Source: .Blood Blood-Peripheral, 01-10 @ 16:10; Results   No Growth Final; Gram Stain: --; Organism: --    Meds: epoetin deidre-epbx (RETACRIT) Injectable 29188 Unit(s) IV Push <User Schedule>  piperacillin/tazobactam IVPB.. 3.375 Gram(s) IV Intermittent every 12 hours      ENDOCRINE  Capillary Blood Glucose    Meds: dextrose 50% Injectable 25 Gram(s) IV Push once  dextrose 50% Injectable 25 Gram(s) IV Push once  insulin glargine Injectable (LANTUS) 8 Unit(s) SubCutaneous at bedtime  insulin lispro (ADMELOG) corrective regimen sliding scale   SubCutaneous three times a day before meals    EXAM:   NEUROLOGY  Exam: Normal, in no acute distress.    HEENT  Exam: Normocephalic, atraumatic.    RESPIRATORY  Exam: Normal expansion / effort.     CARDIOVASCULAR  Exam: S1, S2.  Regular rate and rhythm.    GI/NUTRITION  Exam: Abdomen soft, Non-tender, Non-distended.  Current Diet: consistent carb     VASCULAR  Exam: Right upper extremity AV fistula     MUSCULOSKELETAL  Exam: Oozing from R hand, dressing with evidence of bleeding, kate below RUE with blood.   Bilateral BKAs.  All extremities moving spontaneously without limitations, but R hand movement limited by pain.      ACCESS DEVICES:  [ ] Peripheral IV  [ ] Central Venous Line		[ ] R	[ ] L	[ ] IJ	[x] Fem	[ ] SC	Placed: 1/15/23  [ ] Arterial Line			[ ] R	[ ] L	[ ] Fem	[ ] Rad	[ ] Ax	Placed:   [ ] PICC:					[ ] Mediport  [ ] Urinary Catheter, Date Placed:   [x] Necessity of urinary, arterial, and venous catheters discussed    OTHER MEDICATIONS:  chlorhexidine 2% Cloths 1 Application(s) Topical <User Schedule>  dorzolamide 2%/timolol 0.5% Ophthalmic Solution 1 Drop(s) Left EYE two times a day  mupirocin 2% Ointment 1 Application(s) Both Nostrils two times a day      IMAGING: HISTORY:    24 HOUR EVENTS:    -S/p I+D with Ortho  -Significant oozing noted from midline near snuff box between prolenes, dressing soaked  -Orthopedics team at bedside evaluating  -HD attempted however patient hypotensive to 60's systolic, HD stopped  -1U PRBC ordered, i/s/o suspected blood loss not yet apparent on Crit  -Pressures improved thereafter  -CVC flushing, but drawback in only one lumen    NEURO  RASS (if intubated): 		[facility] ICU (if concern for delirium):  Exam:   Meds: acetaminophen     Tablet .. 975 milliGRAM(s) Oral every 8 hours  HYDROmorphone  Injectable 1 milliGRAM(s) IV Push every 3 hours PRN Severe Pain (7 - 10)  oxyCODONE    IR 5 milliGRAM(s) Oral every 4 hours PRN Moderate Pain (4 - 6)  oxyCODONE    IR 10 milliGRAM(s) Oral every 4 hours PRN Severe Pain (7 - 10)      RESPIRATORY  RR: 14 (01-17-23 @ 00:00) (10 - 21)  SpO2: 100% (01-17-23 @ 00:00) (97% - 100%)  Wt(kg): --  Exam:  Mechanical Ventilation:     Meds:     CARDIOVASCULAR  HR: 60 (01-17-23 @ 00:00) (58 - 75)  BP: 88/54 (01-17-23 @ 00:00) (88/54 - 143/67)  BP(mean): 65 (01-17-23 @ 00:00) (65 - 97)  ABP: --  ABP(mean): --  Wt(kg): --  CVP(cm H2O): --      Exam:   Cardiac Rhythm:   Perfusion     [ ]Adequate   [ ]Inadequate  Mentation   [ ]Normal       [ ]Reduced  Extremities  [ ]Warm         [ ]Cool  Volume Status [ ]Hypervolemic [ ]Euvolemic [ ]Hypovolemic  Meds: midodrine. 10 milliGRAM(s) Oral three times a day      GI/NUTRITION  Exam:   Diet:   Meds: aluminum hydroxide/magnesium hydroxide/simethicone Suspension 30 milliLiter(s) Oral four times a day PRN Indigestion  bisacodyl Suppository 10 milliGRAM(s) Rectal daily PRN If no bowel movement  magnesium hydroxide Suspension 30 milliLiter(s) Oral daily PRN Constipation      GENITOURINARY  I&O's Detail    01-15 @ 07:01 - 01-16 @ 07:00  --------------------------------------------------------  IN:    Oral Fluid: 700 mL  Total IN: 700 mL    OUT:  Total OUT: 0 mL    Total NET: 700 mL      01-16 @ 07:01  -  01-17 @ 01:05  --------------------------------------------------------  IN:    Oral Fluid: 120 mL    PRBCs (Packed Red Blood Cells): 325 mL  Total IN: 445 mL    OUT:  Total OUT: 0 mL    Total NET: 445 mL          01-16    136  |  96<L>  |  26<H>  ----------------------------<  159<H>  3.7   |  25  |  6.17<H>    Ca    8.7      16 Jan 2023 23:33  Phos  3.8     01-16  Mg     2.10     01-16      Meds: multivitamin 1 Tablet(s) Oral daily      HEMATOLOGIC  Meds: aspirin  chewable 81 milliGRAM(s) Oral daily  heparin   Injectable 5000 Unit(s) SubCutaneous every 8 hours                          8.1    9.55  )-----------( 427      ( 16 Jan 2023 23:33 )             25.6     PT/INR - ( 16 Jan 2023 23:33 )   PT: 13.0 sec;   INR: 1.12 ratio         PTT - ( 16 Jan 2023 23:33 )  PTT:29.9 sec    INFECTIOUS DISEASES  T(C): 36.4 (01-17-23 @ 00:00), Max: 37.2 (01-16-23 @ 16:50)  Wt(kg): --  WBC Count: 9.55 K/uL (01-16 @ 23:33)  WBC Count: 11.69 K/uL (01-16 @ 17:32)  WBC Count: 10.93 K/uL (01-16 @ 04:10)    Recent Cultures:  Specimen Source: .Surgical Swab RIGHT FOREARM, 01-14 @ 11:40; Results   Few Escherichia coli  Moderate Streptococcus anginosus  "Susceptibilities not performed"  Few Bacteroides fragilis  "Susceptibilities not performed"; Gram Stain: --; Organism: Escherichia coli  Specimen Source: .Surgical Swab RIGHT DORSAL HAND #2, 01-10 @ 18:18; Results   Moderate Escherichia coli  Few Streptococcus anginosus "Susceptibilities not performed"  Few Bacteroides fragilis "Susceptibilities not performed"; Gram Stain: --; Organism: Escherichia coli  Specimen Source: .Surgical Swab RIGHT DORSAL HAND #1, 01-10 @ 18:08; Results   Moderate Escherichia coli  Few Alpha hemolytic strep "Susceptibilities not performed"  Few Bacteroides fragilis "Susceptibilities not performed"  Moderate Streptococcus anginosus "Susceptibilities not performed"; Gram Stain: --; Organism: Escherichia coli  Specimen Source: .Blood Blood-Peripheral, 01-10 @ 16:20; Results   No Growth Final; Gram Stain: --; Organism: --  Specimen Source: .Blood Blood-Peripheral, 01-10 @ 16:10; Results   No Growth Final; Gram Stain: --; Organism: --    Meds: epoetin deidre-epbx (RETACRIT) Injectable 99924 Unit(s) IV Push <User Schedule>  piperacillin/tazobactam IVPB.. 3.375 Gram(s) IV Intermittent every 12 hours      ENDOCRINE  Capillary Blood Glucose    Meds: dextrose 50% Injectable 25 Gram(s) IV Push once  dextrose 50% Injectable 25 Gram(s) IV Push once  insulin glargine Injectable (LANTUS) 8 Unit(s) SubCutaneous at bedtime  insulin lispro (ADMELOG) corrective regimen sliding scale   SubCutaneous three times a day before meals    EXAM:   NEUROLOGY  Exam: Normal, in no acute distress.    HEENT  Exam: Normocephalic, atraumatic.    RESPIRATORY  Exam: Normal expansion / effort.     CARDIOVASCULAR  Exam: S1, S2.  Regular rate and rhythm.    GI/NUTRITION  Exam: Abdomen soft, Non-tender, Non-distended.  Current Diet: consistent carb     VASCULAR  Exam: Right upper extremity AV fistula     MUSCULOSKELETAL  Exam: Oozing from R hand, dressing with evidence of bleeding, kate below RUE with blood.   Bilateral BKAs.  All extremities moving spontaneously without limitations, but R hand movement limited by pain.      ACCESS DEVICES:  [ ] Peripheral IV  [ ] Central Venous Line		[ ] R	[ ] L	[ ] IJ	[x] Fem	[ ] SC	Placed: 1/15/23  [ ] Arterial Line			[ ] R	[ ] L	[ ] Fem	[ ] Rad	[ ] Ax	Placed:   [ ] PICC:					[ ] Mediport  [ ] Urinary Catheter, Date Placed:   [x] Necessity of urinary, arterial, and venous catheters discussed    OTHER MEDICATIONS:  chlorhexidine 2% Cloths 1 Application(s) Topical <User Schedule>  dorzolamide 2%/timolol 0.5% Ophthalmic Solution 1 Drop(s) Left EYE two times a day  mupirocin 2% Ointment 1 Application(s) Both Nostrils two times a day      IMAGING:

## 2023-01-17 NOTE — PROGRESS NOTE ADULT - NUTRITIONAL ASSESSMENT
MEDICATIONS  (STANDING):  acetaminophen     Tablet .. 975 milliGRAM(s) Oral every 8 hours  aspirin  chewable 81 milliGRAM(s) Oral daily  chlorhexidine 2% Cloths 1 Application(s) Topical <User Schedule>  chlorhexidine 2% Cloths 1 Application(s) Topical daily  dextrose 5%. 1000 milliLiter(s) (100 mL/Hr) IV Continuous <Continuous>  dextrose 5%. 1000 milliLiter(s) (50 mL/Hr) IV Continuous <Continuous>  dextrose 5%. 1000 milliLiter(s) (100 mL/Hr) IV Continuous <Continuous>  dextrose 5%. 1000 milliLiter(s) (50 mL/Hr) IV Continuous <Continuous>  dextrose 50% Injectable 25 Gram(s) IV Push once  dextrose 50% Injectable 12.5 Gram(s) IV Push once  dextrose 50% Injectable 25 Gram(s) IV Push once  dextrose 50% Injectable 25 Gram(s) IV Push once  dextrose 50% Injectable 12.5 Gram(s) IV Push once  dextrose 50% Injectable 25 Gram(s) IV Push once  dorzolamide 2%/timolol 0.5% Ophthalmic Solution 1 Drop(s) Left EYE two times a day  epoetin deidre-epbx (RETACRIT) Injectable 23354 Unit(s) IV Push <User Schedule>  famotidine    Tablet 20 milliGRAM(s) Oral every 12 hours  glucagon  Injectable 1 milliGRAM(s) IntraMuscular once  glucagon  Injectable 1 milliGRAM(s) IntraMuscular once  insulin glargine Injectable (LANTUS) 5 Unit(s) SubCutaneous at bedtime  insulin lispro (ADMELOG) corrective regimen sliding scale   SubCutaneous three times a day before meals  lactated ringers. 1000 milliLiter(s) (75 mL/Hr) IV Continuous <Continuous>  midodrine. 10 milliGRAM(s) Oral three times a day  multivitamin 1 Tablet(s) Oral daily  mupirocin 2% Ointment 1 Application(s) Both Nostrils two times a day  piperacillin/tazobactam IVPB.. 3.375 Gram(s) IV Intermittent every 12 hours

## 2023-01-17 NOTE — DIETITIAN INITIAL EVALUATION ADULT - PERTINENT MEDS FT
MEDICATIONS  (STANDING):  acetaminophen     Tablet .. 975 milliGRAM(s) Oral every 8 hours  aspirin  chewable 81 milliGRAM(s) Oral daily  chlorhexidine 2% Cloths 1 Application(s) Topical <User Schedule>  dorzolamide 2%/timolol 0.5% Ophthalmic Solution 1 Drop(s) Left EYE two times a day  epoetin deidre-epbx (RETACRIT) Injectable 30485 Unit(s) IV Push <User Schedule>  heparin   Injectable 5000 Unit(s) SubCutaneous every 8 hours  insulin glargine Injectable (LANTUS) 10 Unit(s) SubCutaneous at bedtime  insulin lispro (ADMELOG) corrective regimen sliding scale   SubCutaneous three times a day before meals  insulin lispro Injectable (ADMELOG) 3 Unit(s) SubCutaneous three times a day before meals  midodrine. 10 milliGRAM(s) Oral three times a day  multivitamin 1 Tablet(s) Oral daily  piperacillin/tazobactam IVPB.. 3.375 Gram(s) IV Intermittent every 12 hours    MEDICATIONS  (PRN):  bisacodyl Suppository 10 milliGRAM(s) Rectal daily PRN If no bowel movement  HYDROmorphone  Injectable 1 milliGRAM(s) IV Push every 3 hours PRN Severe Pain (7 - 10)  lactulose Syrup 10 Gram(s) Oral daily PRN Constipation  oxyCODONE    IR 5 milliGRAM(s) Oral every 4 hours PRN Moderate Pain (4 - 6)  oxyCODONE    IR 10 milliGRAM(s) Oral every 4 hours PRN Severe Pain (7 - 10)

## 2023-01-17 NOTE — PROGRESS NOTE ADULT - ASSESSMENT
47M with steal syndrome s/p AVF proximalization of arterial inflow using right GSV (1/13).    PLAN:   - Please repeat ppg and duplex ultrasound of AVF (specifically indicate volume rate for fistula)  - can use AVF as needed  - keep right thigh dressing in place x 4days  - hand surgery per ortho  - Rest of care per primary    Vascular Surgery  #[id number]

## 2023-01-17 NOTE — PROCEDURE NOTE - NSPROCNAME_GEN_A_CORE
Point of Care Ultrasound Guided Regional Nerve Block
Central Line Insertion
Arterial Puncture/Cannulation

## 2023-01-17 NOTE — PROGRESS NOTE ADULT - SUBJECTIVE AND OBJECTIVE BOX
ORTHO PROGRESS NOTE     Pt w/ oozing from L index finger stump.  BP decreased to 70's/40's during HD. HD halted Rpt H/H postop 8.1/25.6. Pt given 1U PRBC Pt seen and examined at bedside, denies SOB, CP, Dizziness. N/V/D /HA.  Pain well controlled.    Vital Signs Last 24 Hrs  T(C): 36.4 (17 Jan 2023 00:00), Max: 37.2 (16 Jan 2023 16:50)  T(F): 97.5 (17 Jan 2023 00:00), Max: 98.9 (16 Jan 2023 16:50)  HR: 60 (17 Jan 2023 00:00) (58 - 75)  BP: 88/54 (17 Jan 2023 00:00) (88/54 - 143/67)  BP(mean): 65 (17 Jan 2023 00:00) (65 - 97)  RR: 14 (17 Jan 2023 00:00) (10 - 21)  SpO2: 100% (17 Jan 2023 00:00) (97% - 100%)    Parameters below as of 17 Jan 2023 00:00  Patient On (Oxygen Delivery Method): room air        Gen: NAD  Resp: Nonlabored  R UE:    Oozing from index finger  moving fingers  Tenderness distal tips of fingers  Rest of hand WWP      Labs:  CBC Full  -  ( 16 Jan 2023 23:33 )  WBC Count : 9.55 K/uL  RBC Count : 2.77 M/uL  Hemoglobin : 8.1 g/dL  Hematocrit : 25.6 %  Platelet Count - Automated : 427 K/uL  Mean Cell Volume : 92.4 fL  Mean Cell Hemoglobin : 29.2 pg  Mean Cell Hemoglobin Concentration : 31.6 gm/dL  Auto Neutrophil # : x  Auto Lymphocyte # : x  Auto Monocyte # : x  Auto Eosinophil # : x  Auto Basophil # : x  Auto Neutrophil % : x  Auto Lymphocyte % : x  Auto Monocyte % : x  Auto Eosinophil % : x  Auto Basophil % : x      01-16    136  |  96<L>  |  26<H>  ----------------------------<  159<H>  3.7   |  25  |  6.17<H>    Ca    8.7      16 Jan 2023 23:33  Phos  3.8     01-16  Mg     2.10     01-16            AA/P: 47y y/o Male 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 Repeat I+D on 1/14 and 1/16        - Plan OR 1/19  -Pain control/analgesia  -DVT ppx - Venodynes/HSQ  -FU AM Labs  -Non-weight bearing right upper extremity in bulky dressing  -Daily dressing and packing changes BID  -FU nephrology and HD (MWF)  -FU ID recs (vanc, zosyn)  -FU Vasc surgery recs, s/p fistulogram and duplex  -Appreciate optho recs: outpatient follow-up  -Appreciate SICU level of care, q1HR neurovascular checks.

## 2023-01-17 NOTE — PROGRESS NOTE ADULT - SUBJECTIVE AND OBJECTIVE BOX
POST ANESTHESIA EVALUATION    47y Male POSTOP DAY 1 S/P Incision and Drainage of Right hand    MENTAL STATUS: Patient participation [X] Awake     [  ] Arousable     [  ] Sedated    AIRWAY PATENCY: [X] Satisfactory  [  ] Other:     Vital Signs Last 24 Hrs  T(C): 36.1 (17 Jan 2023 08:00), Max: 37.2 (16 Jan 2023 16:50)  T(F): 96.9 (17 Jan 2023 08:00), Max: 98.9 (16 Jan 2023 16:50)  HR: 67 (17 Jan 2023 10:00) (59 - 75)  BP: 91/52 (17 Jan 2023 10:00) (64/34 - 143/67)  BP(mean): 64 (17 Jan 2023 10:00) (45 - 97)  RR: 20 (17 Jan 2023 10:00) (11 - 20)  SpO2: 100% (17 Jan 2023 10:00) (97% - 100%)    Parameters below as of 17 Jan 2023 08:00  Patient On (Oxygen Delivery Method): room air      I&O's Summary    16 Jan 2023 07:01  -  17 Jan 2023 07:00  --------------------------------------------------------  IN: 1345 mL / OUT: 326 mL / NET: 1019 mL    17 Jan 2023 07:01  -  17 Jan 2023 10:55  --------------------------------------------------------  IN: 550 mL / OUT: 0 mL / NET: 550 mL          NAUSEA/ VOMITTING:  [X] NONE  [  ] CONTROLLED [  ] OTHER     PAIN: [X] CONTROLLED WITH CURRENT REGIMEN  [  ] OTHER    [X] NO APPARENT ANESTHESIA COMPLICATIONS      Comments: Patient was hypotensive overnight requiring 2 units of pRBCs due to oozing from the surgical site.  Currently on 0.1 mcg/kg/min of neosynepherine.  To receive FFP/platelets at this time per ICU team

## 2023-01-17 NOTE — PROGRESS NOTE ADULT - ASSESSMENT
ASSESSMENT:  47 year old male with a PMHx of DM2, bilateral BKAs and ESRD (on HD - M / W / F) who was transferred from [facility] emergently for evaluation of right finger swelling / pain.  Patient was found to have steal syndrome.  Patient is now S/P revision of arteriovenous dialysis fistula by vascular surgery on 1/13/23.  Patient is also S/P right hand / forearm I&D by ortho on 1/14/23. Pending RTOR for repeat I&D with orthopedics on 1/16.       PLAN:    NEUROLOGY:  - Continue with Tylenol, Oxycodone and Dilaudid for pain control      RESPIRATORY:  - No active issues  - Saturating well on room air  - Continuous pulse oximetry  - Maintain O2 saturation >92%    CARDIOVASCULAR:  - Hypotensive at baseline  - Continue with home dose Midodrine 10mg TID  - Keep MAP >65  - pending repeat ppg and duplex of AVF     GI / NUTRITION:  - Regular diet, NPO at midnight for OR  - Bowel regimen with milk of magnesium and Dulcolax      RENAL / GENITOURINARY:  - HD as scheduled per nephrology, tentatively planning for HD this evening   - Monitor electrolytes and replete PRN  - Continue to monitor strict ins and outs q1 hour    HEMATOLOGIC:  - H&H 7.7/25.1  - Per orthopedic service, 2U pRBC on hold   - Continue with Aspirin  - VTE prophylaxis with HSQ    INFECTIOUS DISEASE:  - Currently afebrile with leukocytosis, WBC downtrending  - Continue with IV Zosyn  - Plan RTOR today for I&D    ENDOCRINOLOGY:  - Monitor fingersticks  - Continue with insulin sliding scale and Lantus 8 units qHS    Disposition: SICU ASSESSMENT:  47 year old male with a PMHx of DM2, bilateral BKAs and ESRD (on HD - M / W / F) who was transferred from [facility] emergently for evaluation of right finger swelling / pain.  Patient was found to have steal syndrome.  Patient is now S/P revision of arteriovenous dialysis fistula by vascular surgery on 1/13/23.  Patient is also S/P right hand / forearm I&D by ortho on 1/14/23. Pending RTOR for repeat I&D with orthopedics on 1/16.       PLAN:    NEUROLOGY:  - Continue with Tylenol, Oxycodone and Dilaudid for pain control      RESPIRATORY:  - No active issues  - Saturating well on room air  - Continuous pulse oximetry  - Maintain O2 saturation >92%    CARDIOVASCULAR:  - Hypotensive overnight, with oozing from RUE  -   - Continue with home dose Midodrine 10mg TID  - Keep MAP >65  - pending repeat ppg and duplex of AVF     GI / NUTRITION:  - Consistent carb diet   - Bowel regimen with milk of magnesium and Dulcolax      RENAL / GENITOURINARY:  - HD as scheduled per nephrology   - Monitor electrolytes and replete PRN  - Continue to monitor strict ins and outs q1 hour    HEMATOLOGIC:   - s/p 2U pRBC overnight,   - Continue with Aspirin  - VTE prophylaxis with HSQ    INFECTIOUS DISEASE:  - Currently afebrile with leukocytosis, WBC downtrending  - Continue with IV Zosyn    ENDOCRINOLOGY:  - Monitor fingersticks  - Continue with insulin sliding scale and Lantus 8 units qHS    Disposition: SICU ASSESSMENT:  47 year old male with a PMHx of DM2, bilateral BKAs and ESRD (on HD - M / W / F) who was transferred from [facility] emergently for evaluation of right finger swelling / pain.  Patient was found to have steal syndrome.  Patient is now S/P revision of arteriovenous dialysis fistula by vascular surgery on 1/13/23.  Patient is also S/P right hand / forearm I&D by ortho on 1/14/23. Pending RTOR for repeat I&D with orthopedics on 1/16.       PLAN:    NEUROLOGY:  - Continue with Tylenol, Oxycodone and Dilaudid for pain control      RESPIRATORY:  - No active issues  - Saturating well on room air  - Continuous pulse oximetry  - Maintain O2 saturation >92%    CARDIOVASCULAR:  - Hypotensive overnight, with oozing from RUE  - Continue with home dose Midodrine 10mg TID  - Keep MAP >65  - pending repeat ppg and duplex of AVF     GI / NUTRITION:  - Consistent carb diet   - Bowel regimen with milk of magnesium and Dulcolax      RENAL / GENITOURINARY:  - HD as scheduled per nephrology   - Monitor electrolytes and replete PRN  - Continue to monitor strict ins and outs q1 hour    HEMATOLOGIC:   - s/p 2U pRBC overnight, 1u pRBC 1/17  - Continue with Aspirin  - VTE prophylaxis with HSQ    INFECTIOUS DISEASE:  - Currently afebrile with leukocytosis, WBC downtrending  - Continue with IV Zosyn    ENDOCRINOLOGY:  - Monitor fingersticks  - Continue with insulin sliding scale and Lantus 8 units qHS    Disposition: SICU ASSESSMENT:  47 year old male with a PMHx of DM2, bilateral BKAs and ESRD (on HD - M / W / F) who was transferred from [facility] emergently for evaluation of right finger swelling / pain.  Patient was found to have steal syndrome.  Patient is now S/P revision of arteriovenous dialysis fistula by vascular surgery on 1/13/23.  Patient is also S/P right hand / forearm I&D by ortho on 1/14/23. Pending RTOR for repeat I&D with orthopedics on 1/16.       PLAN:    NEUROLOGY:  - Continue with Tylenol, Oxycodone and Dilaudid for pain control      RESPIRATORY:  - No active issues  - Saturating well on room air  - Continuous pulse oximetry  - Maintain O2 saturation >92%    CARDIOVASCULAR:  - Hypotensive overnight, with oozing from RUE  - Continue with home dose Midodrine 10mg TID  - pending repeat ppg and duplex of AVF       GI / NUTRITION:  - Consistent carb/renal diet   - Bowel regimen with lactulose and Dulcolax      RENAL / GENITOURINARY:  - HD as scheduled per nephrology   - Monitor electrolytes and replete PRN  - Continue to monitor strict ins and outs q1 hour    HEMATOLOGIC:   - s/p 1U pRBC 1/16 prior to OR   - s/p 2U pRBC overnight, will give additional 1u pRBC 1/17  - given ddAVP d/t concerns for uremic bleeding iso ESRD   - will also give platelets + FFP  - order additional fluids/blood as needed  - f/u with orthopedics regarding mgmt of RUE bleeding   - continue with Aspirin  - VTE prophylaxis with HSQ    INFECTIOUS DISEASE:  - Currently afebrile with leukocytosis, WBC downtrending  - Continue with IV Zosyn    ENDOCRINOLOGY:  - Monitor fingersticks  - Continue with insulin sliding scale and Lantus 8 units qHS    Disposition: SICU ASSESSMENT:  47 year old male with a PMHx of DM2, bilateral BKAs and ESRD (on HD - M / W / F) who was transferred from [facility] emergently for evaluation of right finger swelling / pain.  Patient was found to have steal syndrome.  Patient is now S/P revision of arteriovenous dialysis fistula by vascular surgery on 1/13/23.  Patient is also S/P right hand / forearm I&D by ortho on 1/14/23. Pending RTOR for repeat I&D with orthopedics on 1/16.       PLAN:    NEUROLOGY:  - Continue with Tylenol, Oxycodone and Dilaudid for pain control      RESPIRATORY:  - No active issues  - Saturating well on room air  - Continuous pulse oximetry  - Maintain O2 saturation >92%    CARDIOVASCULAR:  - Hypotensive overnight, with oozing from RUE  - Continue with home dose Midodrine 10mg TID  - set MAP goal >60, monitor closely for adequate perfusion  - pending repeat ppg and duplex of AVF       GI / NUTRITION:  - Consistent carb/renal diet   - Bowel regimen with lactulose and Dulcolax      RENAL / GENITOURINARY:  - HD as scheduled per nephrology, next session tentatively for 1/18  - Monitor electrolytes and replete PRN  - Continue to monitor strict ins and outs q1 hour    HEMATOLOGIC:   - s/p 1U pRBC 1/16 prior to OR   - s/p 2U pRBC overnight, will give additional 1u pRBC 1/17  - given ddAVP d/t concerns for uremic bleeding iso ESRD   - will also give platelets + FFP  - order additional fluids/blood as needed  - f/u with orthopedics regarding mgmt of RUE bleeding   - continue with Aspirin  - VTE prophylaxis with HSQ    INFECTIOUS DISEASE:  - Currently afebrile with leukocytosis, WBC downtrending  - Continue with IV Zosyn    ENDOCRINOLOGY:  - Monitor fingersticks  - Continue with insulin sliding scale and Lantus 8 units qHS    Disposition: SICU

## 2023-01-17 NOTE — PROVIDER CONTACT NOTE (OTHER) - ACTION/TREATMENT ORDERED:
Dr. Jenkins advised against pressors at this time.  Recommended terminating treatment due to low blood pressure. Dr. Jenkins advised against pressors at this time.  Recommended terminating treatment due to low blood pressure.  attempted to reach nephrology team.
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
## 2023-01-17 NOTE — PROGRESS NOTE ADULT - SUBJECTIVE AND OBJECTIVE BOX
SURGERY  Pager: #[phone]    INTERVAL EVENTS/SUBJECTIVE: Patient s/p I&D with ortho hand yesterday, overnight with diffuse oozing from surgical site, hypotension during HD requiring pressors, transfused 1u pRBC. Patient seen and examined at bedside, no new complaints.     ______________________________________________  OBJECTIVE:   T(C): 36.1 (01-17-23 @ 08:00), Max: 37.2 (01-16-23 @ 16:50)  HR: 67 (01-17-23 @ 10:00) (59 - 75)  BP: 91/52 (01-17-23 @ 10:00) (64/34 - 143/67)  RR: 20 (01-17-23 @ 10:00) (11 - 20)  SpO2: 100% (01-17-23 @ 10:00) (97% - 100%)  Wt(kg): --  CAPILLARY BLOOD GLUCOSE      POCT Blood Glucose.: 244 mg/dL (17 Jan 2023 07:31)  POCT Blood Glucose.: 251 mg/dL (17 Jan 2023 07:29)  POCT Blood Glucose.: 137 mg/dL (16 Jan 2023 21:20)  POCT Blood Glucose.: 111 mg/dL (16 Jan 2023 19:46)  POCT Blood Glucose.: 173 mg/dL (16 Jan 2023 17:17)  POCT Blood Glucose.: 141 mg/dL (16 Jan 2023 11:32)    I&O's Detail    16 Jan 2023 07:01  -  17 Jan 2023 07:00  --------------------------------------------------------  IN:    Oral Fluid: 120 mL    Other (mL): 500 mL    PRBCs (Packed Red Blood Cells): 725 mL  Total IN: 1345 mL    OUT:    Other (mL): 326 mL  Total OUT: 326 mL    Total NET: 1019 mL        Physical exam:   General: Appears well, NAD. Non toxic appearing, sitting up in bed   Abdomen: soft, nontender, nondistended, no rebound or guarding  Extremities: RUE in ACE bandage with fingers missing. Oozing from around bandages.   ______________________________________________  LABS:  CBC Full  -  ( 17 Jan 2023 08:18 )  WBC Count : 10.31 K/uL  RBC Count : 2.95 M/uL  Hemoglobin : 8.7 g/dL  Hematocrit : 26.6 %  Platelet Count - Automated : 382 K/uL  Mean Cell Volume : 90.2 fL  Mean Cell Hemoglobin : 29.5 pg  Mean Cell Hemoglobin Concentration : 32.7 gm/dL  Auto Neutrophil # : x  Auto Lymphocyte # : x  Auto Monocyte # : x  Auto Eosinophil # : x  Auto Basophil # : x  Auto Neutrophil % : x  Auto Lymphocyte % : x  Auto Monocyte % : x  Auto Eosinophil % : x  Auto Basophil % : x    01-16    136  |  96<L>  |  26<H>  ----------------------------<  159<H>  3.7   |  25  |  6.17<H>    Ca    8.7      16 Jan 2023 23:33  Phos  3.8     01-16  Mg     2.10     01-16      _____________________________________________  RADIOLOGY:

## 2023-01-17 NOTE — PROGRESS NOTE ADULT - ASSESSMENT
Patient is a 48 y/o male PMH DM2, Bilateral BKA, ESRD (on HD MWF), last dialyzed yesterday transferred from [facility] emergently for evaluation of evaluation of right finger swelling/pain. Patient reports history of right finger pain after he had a fall one year ago. Patient had a wound on her second/middle finger who he was seeing a hand surgeon for outpatient but unable ultimately to continue seeing due to insurance issues. Patient reports his middle finger developed increased swelling and became black in color about two weeks ago. Denies fevers. Patient transferred to [facility] for further evaluation and emergent OR. Patient received broad spectrum Abx of Zosyn/vanco/clindamycin at [place]. Interpretor phone used for translation with patient. ID# [id number]         septic workup and ID evaluation,send blood and urine cx,serial lactate levels,monitor vitals closley,ivfs hydration,monitor urine output and renal profile,iv abx as per id cons    CHRONIC KIDNEY DISEASE, STAGE 5:   Serum creatinine is stable at 5.2 , approximating a GFR of *** ml/min.   There is no progression.  No uremic symptoms. No evidence of  worsening  Anemia. Fluid status stable.   Will continue to avoid nephrotoxic drugs.  Patient remains asymptomatic.  Continue current therapy.    BP monitoring,continue current antihypertensive meds, low salt diet,followup with PMD in 1-2 weeks      ANEMIA PLAN:  Anemia of chronic disease:  Well controlled by epo  H and H subtherapeutic .  We will continue epo aiming for a HCT of 32-36 %.   We will monitor Iron stores, B12 and RBC folate .  epoetin deidre-epbx (RETACRIT) Injectable 75056 Unit(s) IV Push     Accuchecks monitoring and insulin sliding scale coverage, no concentrated sweets diet, serial labs and f/up with PMD, monitor HB A 1 C every 3-4 mnth

## 2023-01-17 NOTE — DIETITIAN INITIAL EVALUATION ADULT - PERTINENT LABORATORY DATA
01-16    136  |  96<L>  |  26<H>  ----------------------------<  159<H>  3.7   |  25  |  6.17<H>    Ca    8.7      16 Jan 2023 23:33  Phos  3.8     01-16  Mg     2.10     01-16    POCT Blood Glucose.: 262 mg/dL (01-17-23 @ 12:21)  A1C with Estimated Average Glucose Result: 7.1 % (01-10-23 @ 16:38)

## 2023-01-18 ENCOUNTER — TRANSCRIPTION ENCOUNTER (OUTPATIENT)
Age: 48
End: 2023-01-18

## 2023-01-18 LAB
ANION GAP SERPL CALC-SCNC: 12 MMOL/L — SIGNIFICANT CHANGE UP (ref 7–14)
APTT BLD: 30.3 SEC — SIGNIFICANT CHANGE UP (ref 27–36.3)
BUN SERPL-MCNC: 37 MG/DL — HIGH (ref 7–23)
CALCIUM SERPL-MCNC: 8.8 MG/DL — SIGNIFICANT CHANGE UP (ref 8.4–10.5)
CHLORIDE SERPL-SCNC: 95 MMOL/L — LOW (ref 98–107)
CO2 SERPL-SCNC: 26 MMOL/L — SIGNIFICANT CHANGE UP (ref 22–31)
CREAT SERPL-MCNC: 7.25 MG/DL — HIGH (ref 0.5–1.3)
EGFR: 9 ML/MIN/1.73M2 — LOW
GLUCOSE BLDC GLUCOMTR-MCNC: 136 MG/DL — HIGH (ref 70–99)
GLUCOSE BLDC GLUCOMTR-MCNC: 137 MG/DL — HIGH (ref 70–99)
GLUCOSE BLDC GLUCOMTR-MCNC: 137 MG/DL — HIGH (ref 70–99)
GLUCOSE BLDC GLUCOMTR-MCNC: 150 MG/DL — HIGH (ref 70–99)
GLUCOSE BLDC GLUCOMTR-MCNC: 154 MG/DL — HIGH (ref 70–99)
GLUCOSE BLDC GLUCOMTR-MCNC: 154 MG/DL — HIGH (ref 70–99)
GLUCOSE BLDC GLUCOMTR-MCNC: 155 MG/DL — HIGH (ref 70–99)
GLUCOSE BLDC GLUCOMTR-MCNC: 162 MG/DL — HIGH (ref 70–99)
GLUCOSE BLDC GLUCOMTR-MCNC: 186 MG/DL — HIGH (ref 70–99)
GLUCOSE BLDC GLUCOMTR-MCNC: 192 MG/DL — HIGH (ref 70–99)
GLUCOSE BLDC GLUCOMTR-MCNC: 80 MG/DL — SIGNIFICANT CHANGE UP (ref 70–99)
GLUCOSE SERPL-MCNC: 147 MG/DL — HIGH (ref 70–99)
HCT VFR BLD CALC: 23.3 % — LOW (ref 39–50)
HGB BLD-MCNC: 7.6 G/DL — LOW (ref 13–17)
INR BLD: 1.05 RATIO — SIGNIFICANT CHANGE UP (ref 0.88–1.16)
MAGNESIUM SERPL-MCNC: 2.3 MG/DL — SIGNIFICANT CHANGE UP (ref 1.6–2.6)
MCHC RBC-ENTMCNC: 28.8 PG — SIGNIFICANT CHANGE UP (ref 27–34)
MCHC RBC-ENTMCNC: 32.6 GM/DL — SIGNIFICANT CHANGE UP (ref 32–36)
MCV RBC AUTO: 88.3 FL — SIGNIFICANT CHANGE UP (ref 80–100)
NRBC # BLD: 0 /100 WBCS — SIGNIFICANT CHANGE UP (ref 0–0)
NRBC # FLD: 0.02 K/UL — HIGH (ref 0–0)
PHOSPHATE SERPL-MCNC: 5.2 MG/DL — HIGH (ref 2.5–4.5)
PLATELET # BLD AUTO: 409 K/UL — HIGH (ref 150–400)
POTASSIUM SERPL-MCNC: 4.2 MMOL/L — SIGNIFICANT CHANGE UP (ref 3.5–5.3)
POTASSIUM SERPL-SCNC: 4.2 MMOL/L — SIGNIFICANT CHANGE UP (ref 3.5–5.3)
PROTHROM AB SERPL-ACNC: 12.2 SEC — SIGNIFICANT CHANGE UP (ref 10.5–13.4)
RBC # BLD: 2.64 M/UL — LOW (ref 4.2–5.8)
RBC # FLD: 17.4 % — HIGH (ref 10.3–14.5)
SODIUM SERPL-SCNC: 133 MMOL/L — LOW (ref 135–145)
SURGICAL PATHOLOGY STUDY: SIGNIFICANT CHANGE UP
WBC # BLD: 11.58 K/UL — HIGH (ref 3.8–10.5)
WBC # FLD AUTO: 11.58 K/UL — HIGH (ref 3.8–10.5)

## 2023-01-18 PROCEDURE — 93990 DOPPLER FLOW TESTING: CPT | Mod: 26

## 2023-01-18 PROCEDURE — 99291 CRITICAL CARE FIRST HOUR: CPT | Mod: 24

## 2023-01-18 RX ORDER — KETAMINE HYDROCHLORIDE 100 MG/ML
10 INJECTION INTRAMUSCULAR; INTRAVENOUS ONCE
Refills: 0 | Status: DISCONTINUED | OUTPATIENT
Start: 2023-01-18 | End: 2023-01-18

## 2023-01-18 RX ORDER — DEXTROSE 50 % IN WATER 50 %
15 SYRINGE (ML) INTRAVENOUS ONCE
Refills: 0 | Status: DISCONTINUED | OUTPATIENT
Start: 2023-01-18 | End: 2023-01-18

## 2023-01-18 RX ORDER — SODIUM CHLORIDE 9 MG/ML
1000 INJECTION, SOLUTION INTRAVENOUS
Refills: 0 | Status: DISCONTINUED | OUTPATIENT
Start: 2023-01-18 | End: 2023-01-18

## 2023-01-18 RX ORDER — HYDROMORPHONE HYDROCHLORIDE 2 MG/ML
4 INJECTION INTRAMUSCULAR; INTRAVENOUS; SUBCUTANEOUS EVERY 4 HOURS
Refills: 0 | Status: DISCONTINUED | OUTPATIENT
Start: 2023-01-18 | End: 2023-01-19

## 2023-01-18 RX ORDER — INSULIN LISPRO 100/ML
VIAL (ML) SUBCUTANEOUS AT BEDTIME
Refills: 0 | Status: DISCONTINUED | OUTPATIENT
Start: 2023-01-18 | End: 2023-01-19

## 2023-01-18 RX ORDER — MIDAZOLAM HYDROCHLORIDE 1 MG/ML
2 INJECTION, SOLUTION INTRAMUSCULAR; INTRAVENOUS ONCE
Refills: 0 | Status: DISCONTINUED | OUTPATIENT
Start: 2023-01-18 | End: 2023-01-18

## 2023-01-18 RX ORDER — DEXTROSE 50 % IN WATER 50 %
25 SYRINGE (ML) INTRAVENOUS ONCE
Refills: 0 | Status: DISCONTINUED | OUTPATIENT
Start: 2023-01-18 | End: 2023-01-18

## 2023-01-18 RX ORDER — INSULIN LISPRO 100/ML
VIAL (ML) SUBCUTANEOUS
Refills: 0 | Status: DISCONTINUED | OUTPATIENT
Start: 2023-01-18 | End: 2023-01-19

## 2023-01-18 RX ORDER — GLUCAGON INJECTION, SOLUTION 0.5 MG/.1ML
1 INJECTION, SOLUTION SUBCUTANEOUS ONCE
Refills: 0 | Status: DISCONTINUED | OUTPATIENT
Start: 2023-01-18 | End: 2023-01-18

## 2023-01-18 RX ORDER — INSULIN GLARGINE 100 [IU]/ML
10 INJECTION, SOLUTION SUBCUTANEOUS ONCE
Refills: 0 | Status: COMPLETED | OUTPATIENT
Start: 2023-01-18 | End: 2023-01-18

## 2023-01-18 RX ORDER — DEXTROSE 50 % IN WATER 50 %
12.5 SYRINGE (ML) INTRAVENOUS ONCE
Refills: 0 | Status: DISCONTINUED | OUTPATIENT
Start: 2023-01-18 | End: 2023-01-18

## 2023-01-18 RX ORDER — METHADONE HYDROCHLORIDE 40 MG/1
10 TABLET ORAL DAILY
Refills: 0 | Status: DISCONTINUED | OUTPATIENT
Start: 2023-01-18 | End: 2023-01-21

## 2023-01-18 RX ORDER — HYDROMORPHONE HYDROCHLORIDE 2 MG/ML
8 INJECTION INTRAMUSCULAR; INTRAVENOUS; SUBCUTANEOUS EVERY 4 HOURS
Refills: 0 | Status: DISCONTINUED | OUTPATIENT
Start: 2023-01-18 | End: 2023-01-25

## 2023-01-18 RX ORDER — CHLORHEXIDINE GLUCONATE 213 G/1000ML
1 SOLUTION TOPICAL
Refills: 0 | Status: DISCONTINUED | OUTPATIENT
Start: 2023-01-18 | End: 2023-02-24

## 2023-01-18 RX ORDER — INSULIN GLARGINE 100 [IU]/ML
10 INJECTION, SOLUTION SUBCUTANEOUS ONCE
Refills: 0 | Status: COMPLETED | OUTPATIENT
Start: 2023-01-19 | End: 2023-01-18

## 2023-01-18 RX ORDER — INSULIN LISPRO 100/ML
2 VIAL (ML) SUBCUTANEOUS
Refills: 0 | Status: DISCONTINUED | OUTPATIENT
Start: 2023-01-18 | End: 2023-01-19

## 2023-01-18 RX ORDER — KETAMINE HYDROCHLORIDE 100 MG/ML
20 INJECTION INTRAMUSCULAR; INTRAVENOUS ONCE
Refills: 0 | Status: DISCONTINUED | OUTPATIENT
Start: 2023-01-18 | End: 2023-01-18

## 2023-01-18 RX ORDER — HYDROMORPHONE HYDROCHLORIDE 2 MG/ML
2 INJECTION INTRAMUSCULAR; INTRAVENOUS; SUBCUTANEOUS EVERY 4 HOURS
Refills: 0 | Status: DISCONTINUED | OUTPATIENT
Start: 2023-01-18 | End: 2023-01-19

## 2023-01-18 RX ADMIN — INSULIN GLARGINE 10 UNIT(S): 100 INJECTION, SOLUTION SUBCUTANEOUS at 03:15

## 2023-01-18 RX ADMIN — DORZOLAMIDE HYDROCHLORIDE TIMOLOL MALEATE 1 DROP(S): 20; 5 SOLUTION/ DROPS OPHTHALMIC at 17:23

## 2023-01-18 RX ADMIN — KETAMINE HYDROCHLORIDE 10 MILLIGRAM(S): 100 INJECTION INTRAMUSCULAR; INTRAVENOUS at 09:54

## 2023-01-18 RX ADMIN — Medication 2: at 16:29

## 2023-01-18 RX ADMIN — HEPARIN SODIUM 5000 UNIT(S): 5000 INJECTION INTRAVENOUS; SUBCUTANEOUS at 14:42

## 2023-01-18 RX ADMIN — METHADONE HYDROCHLORIDE 10 MILLIGRAM(S): 40 TABLET ORAL at 11:39

## 2023-01-18 RX ADMIN — DORZOLAMIDE HYDROCHLORIDE TIMOLOL MALEATE 1 DROP(S): 20; 5 SOLUTION/ DROPS OPHTHALMIC at 05:41

## 2023-01-18 RX ADMIN — Medication 975 MILLIGRAM(S): at 22:17

## 2023-01-18 RX ADMIN — MIDAZOLAM HYDROCHLORIDE 2 MILLIGRAM(S): 1 INJECTION, SOLUTION INTRAMUSCULAR; INTRAVENOUS at 09:53

## 2023-01-18 RX ADMIN — CHLORHEXIDINE GLUCONATE 1 APPLICATION(S): 213 SOLUTION TOPICAL at 05:40

## 2023-01-18 RX ADMIN — HYDROMORPHONE HYDROCHLORIDE 4 MILLIGRAM(S): 2 INJECTION INTRAMUSCULAR; INTRAVENOUS; SUBCUTANEOUS at 19:39

## 2023-01-18 RX ADMIN — MIDODRINE HYDROCHLORIDE 10 MILLIGRAM(S): 2.5 TABLET ORAL at 05:30

## 2023-01-18 RX ADMIN — KETAMINE HYDROCHLORIDE 20 MILLIGRAM(S): 100 INJECTION INTRAMUSCULAR; INTRAVENOUS at 09:54

## 2023-01-18 RX ADMIN — Medication 81 MILLIGRAM(S): at 11:39

## 2023-01-18 RX ADMIN — PIPERACILLIN AND TAZOBACTAM 25 GRAM(S): 4; .5 INJECTION, POWDER, LYOPHILIZED, FOR SOLUTION INTRAVENOUS at 05:30

## 2023-01-18 RX ADMIN — Medication 2 UNIT(S): at 07:46

## 2023-01-18 RX ADMIN — INSULIN GLARGINE 10 UNIT(S): 100 INJECTION, SOLUTION SUBCUTANEOUS at 23:01

## 2023-01-18 RX ADMIN — ERYTHROPOIETIN 10000 UNIT(S): 10000 INJECTION, SOLUTION INTRAVENOUS; SUBCUTANEOUS at 10:52

## 2023-01-18 RX ADMIN — HEPARIN SODIUM 5000 UNIT(S): 5000 INJECTION INTRAVENOUS; SUBCUTANEOUS at 22:17

## 2023-01-18 RX ADMIN — MIDAZOLAM HYDROCHLORIDE 2 MILLIGRAM(S): 1 INJECTION, SOLUTION INTRAMUSCULAR; INTRAVENOUS at 20:42

## 2023-01-18 RX ADMIN — Medication 2 UNIT(S): at 11:28

## 2023-01-18 RX ADMIN — HEPARIN SODIUM 5000 UNIT(S): 5000 INJECTION INTRAVENOUS; SUBCUTANEOUS at 05:30

## 2023-01-18 RX ADMIN — Medication 1 TABLET(S): at 11:39

## 2023-01-18 RX ADMIN — Medication 975 MILLIGRAM(S): at 14:43

## 2023-01-18 RX ADMIN — PIPERACILLIN AND TAZOBACTAM 25 GRAM(S): 4; .5 INJECTION, POWDER, LYOPHILIZED, FOR SOLUTION INTRAVENOUS at 17:23

## 2023-01-18 RX ADMIN — Medication 2 UNIT(S): at 16:29

## 2023-01-18 RX ADMIN — MIDODRINE HYDROCHLORIDE 10 MILLIGRAM(S): 2.5 TABLET ORAL at 11:38

## 2023-01-18 RX ADMIN — Medication 975 MILLIGRAM(S): at 05:30

## 2023-01-18 NOTE — PROGRESS NOTE ADULT - SUBJECTIVE AND OBJECTIVE BOX
HISTORY:    24 HOUR EVENTS:    -Dressing changed BID in AM and PM using Ketamine 20mg IV push and Versed 2mg IV  -H/H stable after 2U PRBC given  -Bleeding improved  -DDAVP given for concern of uremic platelet dysfunction  -On insulin drip throughout day, now off and switched to 10U Lantus    NEURO  RASS (if intubated): 		[facility] ICU (if concern for delirium):  Exam:   Meds: acetaminophen     Tablet .. 975 milliGRAM(s) Oral every 8 hours  HYDROmorphone  Injectable 1 milliGRAM(s) IV Push every 3 hours PRN Severe Pain (7 - 10)  oxyCODONE    IR 5 milliGRAM(s) Oral every 4 hours PRN Moderate Pain (4 - 6)      RESPIRATORY  RR: 13 (01-18-23 @ 02:00) (12 - 22)  SpO2: 100% (01-18-23 @ 02:00) (98% - 100%)  Wt(kg): --  Exam:  Mechanical Ventilation:     Meds:     CARDIOVASCULAR  HR: 64 (01-18-23 @ 02:00) (62 - 80)  BP: 91/52 (01-17-23 @ 10:00) (68/42 - 111/59)  BP(mean): 64 (01-17-23 @ 10:00) (49 - 82)  ABP: 139/53 (01-18-23 @ 02:00) (96/42 - 155/61)  ABP(mean): 86 (01-18-23 @ 02:00) (63 - 100)  Wt(kg): --  CVP(cm H2O): --      Exam:   Cardiac Rhythm:   Perfusion     [ ]Adequate   [ ]Inadequate  Mentation   [ ]Normal       [ ]Reduced  Extremities  [ ]Warm         [ ]Cool  Volume Status [ ]Hypervolemic [ ]Euvolemic [ ]Hypovolemic  Meds: midodrine. 25 milliGRAM(s) Oral <User Schedule>  midodrine. 10 milliGRAM(s) Oral three times a day      GI/NUTRITION  Exam:   Diet:   Meds: bisacodyl Suppository 10 milliGRAM(s) Rectal daily PRN If no bowel movement  lactulose Syrup 10 Gram(s) Oral daily PRN Constipation      GENITOURINARY  I&O's Detail    01-16 @ 07:01  -  01-17 @ 07:00  --------------------------------------------------------  IN:    Oral Fluid: 120 mL    Other (mL): 500 mL    PRBCs (Packed Red Blood Cells): 725 mL  Total IN: 1345 mL    OUT:    Other (mL): 326 mL  Total OUT: 326 mL    Total NET: 1019 mL      01-17 @ 07:01 - 01-18 @ 02:39  --------------------------------------------------------  IN:    Insulin: 4 mL    Insulin: 3.5 mL    IV PiggyBack: 150 mL    Oral Fluid: 500 mL    Plasma: 656 mL    Platelets - Single Donor: 307 mL    PRBCs (Packed Red Blood Cells): 300 mL  Total IN: 1920.5 mL    OUT:  Total OUT: 0 mL    Total NET: 1920.5 mL          01-18    133<L>  |  95<L>  |  37<H>  ----------------------------<  147<H>  4.2   |  26  |  7.25<H>    Ca    8.8      18 Jan 2023 00:40  Phos  5.2     01-18  Mg     2.30     01-18      Meds: dextrose 5%. 1000 milliLiter(s) IV Continuous <Continuous>  dextrose 5%. 1000 milliLiter(s) IV Continuous <Continuous>  multivitamin 1 Tablet(s) Oral daily      HEMATOLOGIC  Meds: aspirin  chewable 81 milliGRAM(s) Oral daily  heparin   Injectable 5000 Unit(s) SubCutaneous every 8 hours                          7.6    11.58 )-----------( 409      ( 18 Jan 2023 00:40 )             23.3     PT/INR - ( 18 Jan 2023 00:40 )   PT: 12.2 sec;   INR: 1.05 ratio         PTT - ( 18 Jan 2023 00:40 )  PTT:30.3 sec    INFECTIOUS DISEASES  T(C): 36.1 (01-18-23 @ 00:00), Max: 36.2 (01-17-23 @ 20:00)  Wt(kg): --  WBC Count: 11.58 K/uL (01-18 @ 00:40)  WBC Count: 11.54 K/uL (01-17 @ 16:00)  WBC Count: 10.31 K/uL (01-17 @ 08:18)  WBC Count: 10.50 K/uL (01-17 @ 04:20)    Recent Cultures:  Specimen Source: .Surgical Swab RIGHT FOREARM, 01-14 @ 11:40; Results   Few Escherichia coli  Moderate Streptococcus anginosus  "Susceptibilities not performed"  Few Bacteroides fragilis  "Susceptibilities not performed"; Gram Stain: --; Organism: Escherichia coli    Meds: epoetin deidre-epbx (RETACRIT) Injectable 27975 Unit(s) IV Push <User Schedule>  piperacillin/tazobactam IVPB.. 3.375 Gram(s) IV Intermittent every 12 hours      ENDOCRINE  Capillary Blood Glucose    Meds: dextrose 50% Injectable 25 Gram(s) IV Push once  dextrose 50% Injectable 12.5 Gram(s) IV Push once  dextrose 50% Injectable 25 Gram(s) IV Push once  dextrose Oral Gel 15 Gram(s) Oral once PRN  glucagon  Injectable 1 milliGRAM(s) IntraMuscular once  insulin glargine Injectable (LANTUS) 10 Unit(s) SubCutaneous once  insulin regular Infusion 0.5 Unit(s)/Hr IV Continuous <Continuous>      ACCESS DEVICES:  [ ] Peripheral IV  [ ] Central Venous Line		[ ] R	[ ] L	[ ] IJ	[ ] Fem	[ ] SC	Placed:   [ ] Arterial Line			[ ] R	[ ] L	[ ] Fem	[ ] Rad	[ ] Ax	Placed:   [ ] PICC:					[ ] Mediport  [ ] Urinary Catheter, Date Placed:   [ ] Necessity of urinary, arterial, and venous catheters discussed    OTHER MEDICATIONS:  chlorhexidine 2% Cloths 1 Application(s) Topical <User Schedule>  dorzolamide 2%/timolol 0.5% Ophthalmic Solution 1 Drop(s) Left EYE two times a day      IMAGING: HISTORY:    24 HOUR EVENTS:    -Dressing changed BID in AM and PM using Ketamine 20mg IV push and Versed 2mg IV  -H/H stable after 2U PRBC given  -Bleeding improved  -DDAVP given for concern of uremic platelet dysfunction  -On insulin drip throughout day, now off and will be switched to 10U Lantus    NEURO  RASS (if intubated): 		CAM ICU (if concern for delirium):  Exam:   Meds: acetaminophen     Tablet .. 975 milliGRAM(s) Oral every 8 hours  HYDROmorphone  Injectable 1 milliGRAM(s) IV Push every 3 hours PRN Severe Pain (7 - 10)  oxyCODONE    IR 5 milliGRAM(s) Oral every 4 hours PRN Moderate Pain (4 - 6)      RESPIRATORY  RR: 13 (01-18-23 @ 02:00) (12 - 22)  SpO2: 100% (01-18-23 @ 02:00) (98% - 100%)  Wt(kg): --  Exam:  Mechanical Ventilation:     Meds:     CARDIOVASCULAR  HR: 64 (01-18-23 @ 02:00) (62 - 80)  BP: 91/52 (01-17-23 @ 10:00) (68/42 - 111/59)  BP(mean): 64 (01-17-23 @ 10:00) (49 - 82)  ABP: 139/53 (01-18-23 @ 02:00) (96/42 - 155/61)  ABP(mean): 86 (01-18-23 @ 02:00) (63 - 100)  Wt(kg): --  CVP(cm H2O): --      Exam:   Cardiac Rhythm:   Perfusion     [ ]Adequate   [ ]Inadequate  Mentation   [ ]Normal       [ ]Reduced  Extremities  [ ]Warm         [ ]Cool  Volume Status [ ]Hypervolemic [ ]Euvolemic [ ]Hypovolemic  Meds: midodrine. 25 milliGRAM(s) Oral <User Schedule>  midodrine. 10 milliGRAM(s) Oral three times a day      GI/NUTRITION  Exam:   Diet:   Meds: bisacodyl Suppository 10 milliGRAM(s) Rectal daily PRN If no bowel movement  lactulose Syrup 10 Gram(s) Oral daily PRN Constipation      GENITOURINARY  I&O's Detail    01-16 @ 07:01  -  01-17 @ 07:00  --------------------------------------------------------  IN:    Oral Fluid: 120 mL    Other (mL): 500 mL    PRBCs (Packed Red Blood Cells): 725 mL  Total IN: 1345 mL    OUT:    Other (mL): 326 mL  Total OUT: 326 mL    Total NET: 1019 mL      01-17 @ 07:01  -  01-18 @ 02:39  --------------------------------------------------------  IN:    Insulin: 4 mL    Insulin: 3.5 mL    IV PiggyBack: 150 mL    Oral Fluid: 500 mL    Plasma: 656 mL    Platelets - Single Donor: 307 mL    PRBCs (Packed Red Blood Cells): 300 mL  Total IN: 1920.5 mL    OUT:  Total OUT: 0 mL    Total NET: 1920.5 mL          01-18    133<L>  |  95<L>  |  37<H>  ----------------------------<  147<H>  4.2   |  26  |  7.25<H>    Ca    8.8      18 Jan 2023 00:40  Phos  5.2     01-18  Mg     2.30     01-18      Meds: dextrose 5%. 1000 milliLiter(s) IV Continuous <Continuous>  dextrose 5%. 1000 milliLiter(s) IV Continuous <Continuous>  multivitamin 1 Tablet(s) Oral daily      HEMATOLOGIC  Meds: aspirin  chewable 81 milliGRAM(s) Oral daily  heparin   Injectable 5000 Unit(s) SubCutaneous every 8 hours                          7.6    11.58 )-----------( 409      ( 18 Jan 2023 00:40 )             23.3     PT/INR - ( 18 Jan 2023 00:40 )   PT: 12.2 sec;   INR: 1.05 ratio         PTT - ( 18 Jan 2023 00:40 )  PTT:30.3 sec    INFECTIOUS DISEASES  T(C): 36.1 (01-18-23 @ 00:00), Max: 36.2 (01-17-23 @ 20:00)  Wt(kg): --  WBC Count: 11.58 K/uL (01-18 @ 00:40)  WBC Count: 11.54 K/uL (01-17 @ 16:00)  WBC Count: 10.31 K/uL (01-17 @ 08:18)  WBC Count: 10.50 K/uL (01-17 @ 04:20)    Recent Cultures:  Specimen Source: .Surgical Swab RIGHT FOREARM, 01-14 @ 11:40; Results   Few Escherichia coli  Moderate Streptococcus anginosus  "Susceptibilities not performed"  Few Bacteroides fragilis  "Susceptibilities not performed"; Gram Stain: --; Organism: Escherichia coli    Meds: epoetin deidre-epbx (RETACRIT) Injectable 35980 Unit(s) IV Push <User Schedule>  piperacillin/tazobactam IVPB.. 3.375 Gram(s) IV Intermittent every 12 hours      ENDOCRINE  Capillary Blood Glucose    Meds: dextrose 50% Injectable 25 Gram(s) IV Push once  dextrose 50% Injectable 12.5 Gram(s) IV Push once  dextrose 50% Injectable 25 Gram(s) IV Push once  dextrose Oral Gel 15 Gram(s) Oral once PRN  glucagon  Injectable 1 milliGRAM(s) IntraMuscular once  insulin glargine Injectable (LANTUS) 10 Unit(s) SubCutaneous once  insulin regular Infusion 0.5 Unit(s)/Hr IV Continuous <Continuous>      ACCESS DEVICES:  [ ] Peripheral IV  [ ] Central Venous Line		[ ] R	[ ] L	[ ] IJ	[ ] Fem	[ ] SC	Placed:   [ ] Arterial Line			[ ] R	[ ] L	[ ] Fem	[ ] Rad	[ ] Ax	Placed:   [ ] PICC:					[ ] Mediport  [ ] Urinary Catheter, Date Placed:   [ ] Necessity of urinary, arterial, and venous catheters discussed    OTHER MEDICATIONS:  chlorhexidine 2% Cloths 1 Application(s) Topical <User Schedule>  dorzolamide 2%/timolol 0.5% Ophthalmic Solution 1 Drop(s) Left EYE two times a day      IMAGING: HISTORY:    24 HOUR EVENTS:    -Dressing changed BID in AM and PM using Ketamine 20mg IV push and Versed 2mg IV  -H/H stable after 2U PRBC given in afternoon  -Bleeding improved  -DDAVP given for concern of uremic platelet dysfunction  -On insulin drip throughout day, now off and will be switched to 10U Lantus  -1U PRBC ordered at Ortho request for this AM in anticipation of OR 1/19    NEURO  RASS (if intubated): 		[facility] ICU (if concern for delirium):  Exam:   Meds: acetaminophen     Tablet .. 975 milliGRAM(s) Oral every 8 hours  HYDROmorphone  Injectable 1 milliGRAM(s) IV Push every 3 hours PRN Severe Pain (7 - 10)  oxyCODONE    IR 5 milliGRAM(s) Oral every 4 hours PRN Moderate Pain (4 - 6)      RESPIRATORY  RR: 13 (01-18-23 @ 02:00) (12 - 22)  SpO2: 100% (01-18-23 @ 02:00) (98% - 100%)  Wt(kg): --  Exam:  Mechanical Ventilation:     Meds:     CARDIOVASCULAR  HR: 64 (01-18-23 @ 02:00) (62 - 80)  BP: 91/52 (01-17-23 @ 10:00) (68/42 - 111/59)  BP(mean): 64 (01-17-23 @ 10:00) (49 - 82)  ABP: 139/53 (01-18-23 @ 02:00) (96/42 - 155/61)  ABP(mean): 86 (01-18-23 @ 02:00) (63 - 100)  Wt(kg): --  CVP(cm H2O): --      Exam:   Cardiac Rhythm:   Perfusion     [ ]Adequate   [ ]Inadequate  Mentation   [ ]Normal       [ ]Reduced  Extremities  [ ]Warm         [ ]Cool  Volume Status [ ]Hypervolemic [ ]Euvolemic [ ]Hypovolemic  Meds: midodrine. 25 milliGRAM(s) Oral <User Schedule>  midodrine. 10 milliGRAM(s) Oral three times a day      GI/NUTRITION  Exam:   Diet:   Meds: bisacodyl Suppository 10 milliGRAM(s) Rectal daily PRN If no bowel movement  lactulose Syrup 10 Gram(s) Oral daily PRN Constipation      GENITOURINARY  I&O's Detail    01-16 @ 07:01 - 01-17 @ 07:00  --------------------------------------------------------  IN:    Oral Fluid: 120 mL    Other (mL): 500 mL    PRBCs (Packed Red Blood Cells): 725 mL  Total IN: 1345 mL    OUT:    Other (mL): 326 mL  Total OUT: 326 mL    Total NET: 1019 mL      01-17 @ 07:01 - 01-18 @ 02:39  --------------------------------------------------------  IN:    Insulin: 4 mL    Insulin: 3.5 mL    IV PiggyBack: 150 mL    Oral Fluid: 500 mL    Plasma: 656 mL    Platelets - Single Donor: 307 mL    PRBCs (Packed Red Blood Cells): 300 mL  Total IN: 1920.5 mL    OUT:  Total OUT: 0 mL    Total NET: 1920.5 mL          01-18    133<L>  |  95<L>  |  37<H>  ----------------------------<  147<H>  4.2   |  26  |  7.25<H>    Ca    8.8      18 Jan 2023 00:40  Phos  5.2     01-18  Mg     2.30     01-18      Meds: dextrose 5%. 1000 milliLiter(s) IV Continuous <Continuous>  dextrose 5%. 1000 milliLiter(s) IV Continuous <Continuous>  multivitamin 1 Tablet(s) Oral daily      HEMATOLOGIC  Meds: aspirin  chewable 81 milliGRAM(s) Oral daily  heparin   Injectable 5000 Unit(s) SubCutaneous every 8 hours                          7.6    11.58 )-----------( 409      ( 18 Jan 2023 00:40 )             23.3     PT/INR - ( 18 Jan 2023 00:40 )   PT: 12.2 sec;   INR: 1.05 ratio         PTT - ( 18 Jan 2023 00:40 )  PTT:30.3 sec    INFECTIOUS DISEASES  T(C): 36.1 (01-18-23 @ 00:00), Max: 36.2 (01-17-23 @ 20:00)  Wt(kg): --  WBC Count: 11.58 K/uL (01-18 @ 00:40)  WBC Count: 11.54 K/uL (01-17 @ 16:00)  WBC Count: 10.31 K/uL (01-17 @ 08:18)  WBC Count: 10.50 K/uL (01-17 @ 04:20)    Recent Cultures:  Specimen Source: .Surgical Swab RIGHT FOREARM, 01-14 @ 11:40; Results   Few Escherichia coli  Moderate Streptococcus anginosus  "Susceptibilities not performed"  Few Bacteroides fragilis  "Susceptibilities not performed"; Gram Stain: --; Organism: Escherichia coli    Meds: epoetin deidre-epbx (RETACRIT) Injectable 94250 Unit(s) IV Push <User Schedule>  piperacillin/tazobactam IVPB.. 3.375 Gram(s) IV Intermittent every 12 hours      ENDOCRINE  Capillary Blood Glucose    Meds: dextrose 50% Injectable 25 Gram(s) IV Push once  dextrose 50% Injectable 12.5 Gram(s) IV Push once  dextrose 50% Injectable 25 Gram(s) IV Push once  dextrose Oral Gel 15 Gram(s) Oral once PRN  glucagon  Injectable 1 milliGRAM(s) IntraMuscular once  insulin glargine Injectable (LANTUS) 10 Unit(s) SubCutaneous once  insulin regular Infusion 0.5 Unit(s)/Hr IV Continuous <Continuous>      ACCESS DEVICES:  [ ] Peripheral IV  [ ] Central Venous Line		[ ] R	[ ] L	[ ] IJ	[ ] Fem	[ ] SC	Placed:   [ ] Arterial Line			[ ] R	[ ] L	[ ] Fem	[ ] Rad	[ ] Ax	Placed:   [ ] PICC:					[ ] Mediport  [ ] Urinary Catheter, Date Placed:   [ ] Necessity of urinary, arterial, and venous catheters discussed    OTHER MEDICATIONS:  chlorhexidine 2% Cloths 1 Application(s) Topical <User Schedule>  dorzolamide 2%/timolol 0.5% Ophthalmic Solution 1 Drop(s) Left EYE two times a day      IMAGING:

## 2023-01-18 NOTE — PROGRESS NOTE ADULT - ASSESSMENT
Patient is a 48 y/o male PMH DM2, Bilateral BKA, ESRD (on HD MWF), last dialyzed yesterday transferred from [facility] emergently for evaluation of evaluation of right finger swelling/pain. Patient reports history of right finger pain after he had a fall one year ago. Patient had a wound on her second/middle finger who he was seeing a hand surgeon for outpatient but unable ultimately to continue seeing due to insurance issues. Patient reports his middle finger developed increased swelling and became black in color about two weeks ago. Denies fevers. Patient transferred to [facility] for further evaluation and emergent OR. Patient received broad spectrum Abx of Zosyn/vanco/clindamycin at [place]. Interpretor phone used for translation with patient. ID# [id number]         septic workup and ID evaluation,send blood and urine cx,serial lactate levels,monitor vitals closley,ivfs hydration,monitor urine output and renal profile,iv abx as per id cons    CHRONIC KIDNEY DISEASE, STAGE 5:   Serum creatinine is stable at 5.2 , approximating a GFR of *** ml/min.   There is no progression.  No uremic symptoms. No evidence of  worsening  Anemia. Fluid status stable.   Will continue to avoid nephrotoxic drugs.  Patient remains asymptomatic.  Continue current therapy.    BP monitoring,continue current antihypertensive meds, low salt diet,followup with PMD in 1-2 weeks      ANEMIA PLAN:  Anemia of chronic disease:  Well controlled by epo  H and H subtherapeutic .  We will continue epo aiming for a HCT of 32-36 %.   We will monitor Iron stores, B12 and RBC folate .  epoetin deidre-epbx (RETACRIT) Injectable 22450 Unit(s) IV Push     Accuchecks monitoring and insulin sliding scale coverage, no concentrated sweets diet, serial labs and f/up with PMD, monitor HB A 1 C every 3-4 mnth

## 2023-01-18 NOTE — PROGRESS NOTE ADULT - SUBJECTIVE AND OBJECTIVE BOX
Patient is a 47y Male whom presented to the hospital with esrd on hd     PAST MEDICAL & SURGICAL HISTORY:      MEDICATIONS  (STANDING):  dextrose 5%. 1000 milliLiter(s) (100 mL/Hr) IV Continuous <Continuous>  dextrose 5%. 1000 milliLiter(s) (50 mL/Hr) IV Continuous <Continuous>  dextrose 50% Injectable 25 Gram(s) IV Push once  dextrose 50% Injectable 25 Gram(s) IV Push once  dextrose 50% Injectable 12.5 Gram(s) IV Push once  glucagon  Injectable 1 milliGRAM(s) IntraMuscular once  insulin lispro (ADMELOG) corrective regimen sliding scale   SubCutaneous every 6 hours  pantoprazole    Tablet 40 milliGRAM(s) Oral daily  polyethylene glycol 3350 17 Gram(s) Oral daily  senna 2 Tablet(s) Oral at bedtime      Allergies    No Known Allergies    Intolerances        SOCIAL HISTORY:  Denies ETOh,Smoking,     FAMILY HISTORY:      REVIEW OF SYSTEMS:    CONSTITUTIONAL: No weakness, fevers or chills  NECK: No pain or stiffness  RESPIRATORY: No cough, wheezing, hemoptysis; No shortness of breath  CARDIOVASCULAR: No chest pain or palpitations  GASTROINTESTINAL: No abdominal or epigastric pain. No nausea, vomiting,                                                         7.6    11.58 )-----------( 409      ( 18 Jan 2023 00:40 )             23.3       CBC Full  -  ( 18 Jan 2023 00:40 )  WBC Count : 11.58 K/uL  RBC Count : 2.64 M/uL  Hemoglobin : 7.6 g/dL  Hematocrit : 23.3 %  Platelet Count - Automated : 409 K/uL  Mean Cell Volume : 88.3 fL  Mean Cell Hemoglobin : 28.8 pg  Mean Cell Hemoglobin Concentration : 32.6 gm/dL  Auto Neutrophil # : x  Auto Lymphocyte # : x  Auto Monocyte # : x  Auto Eosinophil # : x  Auto Basophil # : x  Auto Neutrophil % : x  Auto Lymphocyte % : x  Auto Monocyte % : x  Auto Eosinophil % : x  Auto Basophil % : x      01-18    133<L>  |  95<L>  |  37<H>  ----------------------------<  147<H>  4.2   |  26  |  7.25<H>    Ca    8.8      18 Jan 2023 00:40  Phos  5.2     01-18  Mg     2.30     01-18        CAPILLARY BLOOD GLUCOSE      POCT Blood Glucose.: 192 mg/dL (18 Jan 2023 16:26)  POCT Blood Glucose.: 80 mg/dL (18 Jan 2023 11:35)  POCT Blood Glucose.: 136 mg/dL (18 Jan 2023 07:43)  POCT Blood Glucose.: 137 mg/dL (18 Jan 2023 06:13)  POCT Blood Glucose.: 137 mg/dL (18 Jan 2023 05:04)  POCT Blood Glucose.: 155 mg/dL (18 Jan 2023 04:14)  POCT Blood Glucose.: 154 mg/dL (18 Jan 2023 03:13)  POCT Blood Glucose.: 162 mg/dL (18 Jan 2023 02:09)  POCT Blood Glucose.: 154 mg/dL (18 Jan 2023 01:13)  POCT Blood Glucose.: 150 mg/dL (18 Jan 2023 00:21)  POCT Blood Glucose.: 157 mg/dL (17 Jan 2023 23:14)  POCT Blood Glucose.: 148 mg/dL (17 Jan 2023 22:07)  POCT Blood Glucose.: 159 mg/dL (17 Jan 2023 21:25)  POCT Blood Glucose.: 156 mg/dL (17 Jan 2023 20:23)  POCT Blood Glucose.: 99 mg/dL (17 Jan 2023 19:17)      Vital Signs Last 24 Hrs  T(C): 36.6 (18 Jan 2023 16:00), Max: 36.7 (18 Jan 2023 10:18)  T(F): 97.8 (18 Jan 2023 16:00), Max: 98 (18 Jan 2023 10:18)  HR: 64 (18 Jan 2023 18:00) (64 - 80)  BP: --  BP(mean): --  RR: 19 (18 Jan 2023 18:00) (12 - 23)  SpO2: 100% (18 Jan 2023 18:00) (100% - 100%)    Parameters below as of 18 Jan 2023 16:00  Patient On (Oxygen Delivery Method): room air            PT/INR - ( 18 Jan 2023 00:40 )   PT: 12.2 sec;   INR: 1.05 ratio         PTT - ( 18 Jan 2023 00:40 )  PTT:30.3 sec                                 PHYSICAL EXAM:    Constitutional: NAD  HEENT: conjunctive   clear   Neck:  No JVD  Respiratory: CTAB  Cardiovascular: S1 and S2  Gastrointestinal: BS+, soft, NT/ND

## 2023-01-18 NOTE — PROGRESS NOTE ADULT - CRITICAL CARE ATTENDING COMMENT
NEUROLOGY:  - Pain control: transition to dilaudid PO 2/4/8 for mild/mod/severe pain q4 PRN  - for future dressing changes, consider dilaudid PRN to minimize ketamine requirements  - resume home Methadone 10mg daily      RESPIRATORY:  - No active issues  - Saturating well on room air  - Continuous pulse oximetry  - Maintain O2 saturation >92%    CARDIOVASCULAR:  - RUE bleeding improving  - now off pressors   - continue with home dose Midodrine 10mg TID  - set MAP goal >60, monitor closely for adequate perfusion  - pending repeat ppg and duplex of AVF       GI / NUTRITION:  - Diet: Consistent carb/renal diet; NPO at midnight for OR tomorrow 1/19  - Bowel regimen with lactulose and Dulcolax      RENAL / GENITOURINARY:  - HD today 1/18  - Monitor electrolytes and replete PRN  - Continue to monitor strict ins and outs q1 hour    HEMATOLOGIC:   - s/p 1U pRBC 1/16 prior to OR   - s/p 3U pRBC, ddAVP, 2U plasma, 1U plts 1/17  - 1U pRBC given overnight 1/18 per ortho request  - order additional fluids/blood as needed  - continue with Aspirin  - VTE prophylaxis with HSQ  - continue to monitor H&H on AM labs     INFECTIOUS DISEASE:  - Currently afebrile with leukocytosis, WBC downtrending  - Continue with IV Zosyn    ENDOCRINOLOGY:  - Monitor fingersticks  - S/p insulin gtt, transitioned to Lantus 10U qhs and Admelog 2U TID  - Continue with insulin sliding scale    Disposition: SICU
NEUROLOGY:  - Continue with Tylenol, Oxycodone and Dilaudid for pain control  -underwent procedural sedation with monitoring (versed+ketamine for dressing change)      RESPIRATORY:  - No active issues  - Saturating well on room air  - Continuous pulse oximetry  - Maintain O2 saturation >92%    CARDIOVASCULAR: hypovolemic hemorrhagic shock  - Hypotensive overnight, with oozing from RUE  - Continue with home dose Midodrine 10mg TID, phenylephrine as bridge to fluid resuscitation  - set MAP goal >60, monitor closely for adequate perfusion  - pending repeat ppg and duplex of AVF       GI / NUTRITION:  - Consistent carb/renal diet   - Bowel regimen with lactulose and Dulcolax  -avoid MOM laxative given ESRD      RENAL / GENITOURINARY: last HD incomplete due to hypotension  - Monitor electrolytes and replete PRN  - Continue to monitor strict ins and outs q1 hour    HEMATOLOGIC: acute blood loss anemia, uremic plt dysfunction, MTP  - s/p 1U pRBC 1/16 prior to OR   - s/p 2U pRBC overnight, will give additional 1u pRBC 1/17  - given ddAVP d/t concerns for uremic bleeding iso ESRD   - will also give platelets + FFP  - order additional fluids/blood as needed  - f/u with orthopedics regarding mgmt of RUE bleeding   - continue with Aspirin  - VTE prophylaxis with HSQ    INFECTIOUS DISEASE:  - Currently afebrile with leukocytosis, WBC downtrending  - Continue with IV Zosyn    ENDOCRINOLOGY:  - Monitor fingersticks  - Continue with insulin sliding scale and Lantus 8 units qHS    Disposition: SICU

## 2023-01-18 NOTE — PROGRESS NOTE ADULT - NUTRITIONAL ASSESSMENT
MEDICATIONS  (STANDING):  acetaminophen     Tablet .. 975 milliGRAM(s) Oral every 8 hours  aspirin  chewable 81 milliGRAM(s) Oral daily  chlorhexidine 2% Cloths 1 Application(s) Topical <User Schedule>  chlorhexidine 2% Cloths 1 Application(s) Topical daily  dextrose 5%. 1000 milliLiter(s) (100 mL/Hr) IV Continuous <Continuous>  dextrose 5%. 1000 milliLiter(s) (50 mL/Hr) IV Continuous <Continuous>  dextrose 5%. 1000 milliLiter(s) (100 mL/Hr) IV Continuous <Continuous>  dextrose 5%. 1000 milliLiter(s) (50 mL/Hr) IV Continuous <Continuous>  dextrose 50% Injectable 25 Gram(s) IV Push once  dextrose 50% Injectable 12.5 Gram(s) IV Push once  dextrose 50% Injectable 25 Gram(s) IV Push once  dextrose 50% Injectable 25 Gram(s) IV Push once  dextrose 50% Injectable 12.5 Gram(s) IV Push once  dextrose 50% Injectable 25 Gram(s) IV Push once  dorzolamide 2%/timolol 0.5% Ophthalmic Solution 1 Drop(s) Left EYE two times a day  epoetin deidre-epbx (RETACRIT) Injectable 25224 Unit(s) IV Push <User Schedule>  famotidine    Tablet 20 milliGRAM(s) Oral every 12 hours  glucagon  Injectable 1 milliGRAM(s) IntraMuscular once  glucagon  Injectable 1 milliGRAM(s) IntraMuscular once  insulin glargine Injectable (LANTUS) 5 Unit(s) SubCutaneous at bedtime  insulin lispro (ADMELOG) corrective regimen sliding scale   SubCutaneous three times a day before meals  lactated ringers. 1000 milliLiter(s) (75 mL/Hr) IV Continuous <Continuous>  midodrine. 10 milliGRAM(s) Oral three times a day  multivitamin 1 Tablet(s) Oral daily  mupirocin 2% Ointment 1 Application(s) Both Nostrils two times a day  piperacillin/tazobactam IVPB.. 3.375 Gram(s) IV Intermittent every 12 hours

## 2023-01-18 NOTE — PROGRESS NOTE ADULT - SUBJECTIVE AND OBJECTIVE BOX
ORTHO PROGRESS NOTE     Patient seen and examined. Dressing change overnight without issue. 1U given overnight. Off pressors     Vital Signs Last 24 Hrs  T(C): 36.3 (18 Jan 2023 04:00), Max: 36.3 (18 Jan 2023 04:00)  T(F): 97.3 (18 Jan 2023 04:00), Max: 97.3 (18 Jan 2023 04:00)  HR: 74 (18 Jan 2023 06:00) (62 - 80)  BP: 91/52 (17 Jan 2023 10:00) (87/50 - 111/59)  BP(mean): 64 (17 Jan 2023 10:00) (62 - 82)  RR: 15 (18 Jan 2023 06:00) (12 - 22)  SpO2: 100% (18 Jan 2023 06:00) (98% - 100%)    Parameters below as of 18 Jan 2023 06:00  Patient On (Oxygen Delivery Method): room air          Gen: NAD  Resp: Nonlabored  R UE:    Oozing from index finger  moving fingers  Tenderness distal tips of fingers  Rest of hand WWP  No pain proximally      Labs:  CBC Full  -  ( 16 Jan 2023 23:33 )  WBC Count : 9.55 K/uL  RBC Count : 2.77 M/uL  Hemoglobin : 8.1 g/dL  Hematocrit : 25.6 %  Platelet Count - Automated : 427 K/uL  Mean Cell Volume : 92.4 fL  Mean Cell Hemoglobin : 29.2 pg  Mean Cell Hemoglobin Concentration : 31.6 gm/dL  Auto Neutrophil # : x  Auto Lymphocyte # : x  Auto Monocyte # : x  Auto Eosinophil # : x  Auto Basophil # : x  Auto Neutrophil % : x  Auto Lymphocyte % : x  Auto Monocyte % : x  Auto Eosinophil % : x  Auto Basophil % : x      01-16    136  |  96<L>  |  26<H>  ----------------------------<  159<H>  3.7   |  25  |  6.17<H>    Ca    8.7      16 Jan 2023 23:33  Phos  3.8     01-16  Mg     2.10     01-16            AA/P: 47y y/o Male 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 Repeat I+D on 1/14 and 1/16        - Plan OR 1/19 for rpt I&D   -Pain control/analgesia  -DVT ppx - Venodynes/HSQ  -FU AM Labs  -Non-weight bearing right upper extremity in bulky dressing  -Daily dressing and packing changes BID  -FU nephrology and HD ([facility])  -FU ID recs (zosyn)  -FU [facility] surgery recs, s/p fistulogram and duplex  -Appreciate optho recs: outpatient follow-up  -Appreciate SICU level of care, q1HR neurovascular checks.

## 2023-01-18 NOTE — PROGRESS NOTE ADULT - ASSESSMENT
ASSESSMENT:  47 year old male with a PMHx of DM2, bilateral BKAs and ESRD (on HD - M / W / F) who was transferred from [facility] emergently for evaluation of right finger swelling / pain.  Patient was found to have steal syndrome.  Patient is now S/P revision of arteriovenous dialysis fistula by vascular surgery on 1/13/23.  Repeat right hand / forearm I&D by ortho on 1/14/23, 1/16/23.     PLAN:    NEUROLOGY:  - Continue with Tylenol, Oxycodone and Dilaudid for pain control      RESPIRATORY:  - No active issues  - Saturating well on room air  - Continuous pulse oximetry  - Maintain O2 saturation >92%    CARDIOVASCULAR:  - Hypotensive overnight, with oozing from RUE  - Continue with home dose Midodrine 10mg TID  - set MAP goal >60, monitor closely for adequate perfusion  - pending repeat ppg and duplex of AVF       GI / NUTRITION:  - Consistent carb/renal diet   - Bowel regimen with lactulose and Dulcolax      RENAL / GENITOURINARY:  - HD as scheduled per nephrology, next session tentatively for 1/18  - Monitor electrolytes and replete PRN  - Continue to monitor strict ins and outs q1 hour    HEMATOLOGIC:   - s/p 1U pRBC 1/16 prior to OR   - s/p 3U pRBC, ddAVP, 2U plasma, 1U plts 1/17  - order additional fluids/blood as needed  - continue with Aspirin  - VTE prophylaxis with HSQ    INFECTIOUS DISEASE:  - Currently afebrile with leukocytosis, WBC downtrending  - Continue with IV Zosyn    ENDOCRINOLOGY:  - Monitor fingersticks  - Continue with insulin sliding scale and Lantus 8 units qHS    Disposition: SICU ASSESSMENT:  47 year old male with a PMHx of DM2, bilateral BKAs and ESRD (on HD - M / W / F) who was transferred from [facility] emergently for evaluation of right finger swelling / pain.  Patient was found to have steal syndrome.  Patient is now S/P revision of arteriovenous dialysis fistula by vascular surgery on 1/13/23.  Repeat right hand / forearm I&D by ortho on 1/14/23, 1/16/23.     PLAN:    NEUROLOGY:  - Continue with Tylenol, Oxycodone and Dilaudid for pain control      RESPIRATORY:  - No active issues  - Saturating well on room air  - Continuous pulse oximetry  - Maintain O2 saturation >92%    CARDIOVASCULAR:  - Hypotensive overnight, with oozing from RUE  - Continue with home dose Midodrine 10mg TID  - set MAP goal >60, monitor closely for adequate perfusion  - pending repeat ppg and duplex of AVF       GI / NUTRITION:  - Consistent carb/renal diet   - Bowel regimen with lactulose and Dulcolax      RENAL / GENITOURINARY:  - HD as scheduled per nephrology, next session tentatively for 1/18  - Monitor electrolytes and replete PRN  - Continue to monitor strict ins and outs q1 hour    HEMATOLOGIC:   - s/p 1U pRBC 1/16 prior to OR   - s/p 3U pRBC, ddAVP, 2U plasma, 1U plts 1/17  - 1U pRBC given overnight 1/18 per ortho request  - order additional fluids/blood as needed  - continue with Aspirin  - VTE prophylaxis with HSQ    INFECTIOUS DISEASE:  - Currently afebrile with leukocytosis, WBC downtrending  - Continue with IV Zosyn    ENDOCRINOLOGY:  - Monitor fingersticks  - S/p insulin gtt, transitioned to Lantus 10U qhs and Admelog 2U TID  - Continue with insulin sliding scale    Disposition: SICU ASSESSMENT:  47 year old male with a PMHx of DM2, bilateral BKAs and ESRD (on HD - M / W / F) who was transferred from [facility] emergently for evaluation of right finger swelling / pain.  Patient was found to have steal syndrome.  Patient is now S/P revision of arteriovenous dialysis fistula by vascular surgery on 1/13/23.  Repeat right hand / forearm I&D by ortho on 1/14/23, 1/16/23.     PLAN:    NEUROLOGY:  - Continue with Tylenol, Oxycodone and Dilaudid for pain control  - d/c iv dilaudid, can do po dilaudid prn  - 10mg methadone daily    RESPIRATORY:  - No active issues  - Saturating well on room air  - Continuous pulse oximetry  - Maintain O2 saturation >92%    CARDIOVASCULAR:  - Hypotensive overnight, with oozing from RUE  - Continue with home dose Midodrine 10mg TID  - set MAP goal >60, monitor closely for adequate perfusion  - pending repeat ppg and duplex of AVF       GI / NUTRITION:  - npo tonight for OR  - Consistent carb/renal diet   - Bowel regimen with lactulose and Dulcolax      RENAL / GENITOURINARY:  - HD done 1/18  - Monitor electrolytes and replete PRN  - Continue to monitor strict ins and outs q1 hour    HEMATOLOGIC:   - s/p 1U pRBC 1/16 prior to OR   - s/p 3U pRBC, ddAVP, 2U plasma, 1U plts 1/17  - 1U pRBC given overnight 1/18 per ortho request  - order additional fluids/blood as needed  - continue with Aspirin  - VTE prophylaxis with HSQ    INFECTIOUS DISEASE:  - Currently afebrile with leukocytosis, WBC downtrending  - Continue with IV Zosyn    ENDOCRINOLOGY:  - Monitor fingersticks  - S/p insulin gtt, transitioned to Lantus 10U qhs and Admelog 2U TID  - Continue with insulin sliding scale    Disposition: SICU ASSESSMENT:  47 year old male with a PMHx of DM2, bilateral BKAs and ESRD (on HD - M / W / F) who was transferred from [facility] emergently for evaluation of right finger swelling / pain.  Patient was found to have steal syndrome.  Patient is now S/P revision of arteriovenous dialysis fistula by vascular surgery on 1/13/23.  Repeat right hand / forearm I&D by ortho on 1/14/23, 1/16/23. Under SICU care for hemorrhagic show, now stabilized and pain management for dressing changes requiring conscious sedation.       PLAN:    NEUROLOGY:  - Pain control: transition to dilaudid PO 2/4/8 for mild/mod/severe pain q4 PRN  - for future dressing changes, consider dilaudid PRN to minimize ketamine requirements  - resume home Methadone 10mg daily      RESPIRATORY:  - No active issues  - Saturating well on room air  - Continuous pulse oximetry  - Maintain O2 saturation >92%    CARDIOVASCULAR:  - RUE bleeding improving  - now off pressors   - continue with home dose Midodrine 10mg TID  - set MAP goal >60, monitor closely for adequate perfusion  - pending repeat ppg and duplex of AVF       GI / NUTRITION:  - Diet: Consistent carb/renal diet; NPO at midnight for OR tomorrow 1/19  - Bowel regimen with lactulose and Dulcolax      RENAL / GENITOURINARY:  - HD today 1/18  - Monitor electrolytes and replete PRN  - Continue to monitor strict ins and outs q1 hour    HEMATOLOGIC:   - s/p 1U pRBC 1/16 prior to OR   - s/p 3U pRBC, ddAVP, 2U plasma, 1U plts 1/17  - 1U pRBC given overnight 1/18 per ortho request  - order additional fluids/blood as needed  - continue with Aspirin  - VTE prophylaxis with HSQ  - continue to monitor H&H on AM labs     INFECTIOUS DISEASE:  - Currently afebrile with leukocytosis, WBC downtrending  - Continue with IV Zosyn    ENDOCRINOLOGY:  - Monitor fingersticks  - S/p insulin gtt, transitioned to Lantus 10U qhs and Admelog 2U TID  - Continue with insulin sliding scale    Disposition: SICU

## 2023-01-19 LAB
ALBUMIN SERPL ELPH-MCNC: 3 G/DL — LOW (ref 3.3–5)
ALP SERPL-CCNC: 77 U/L — SIGNIFICANT CHANGE UP (ref 40–120)
ALT FLD-CCNC: 9 U/L — SIGNIFICANT CHANGE UP (ref 4–41)
ANION GAP SERPL CALC-SCNC: 12 MMOL/L — SIGNIFICANT CHANGE UP (ref 7–14)
ANION GAP SERPL CALC-SCNC: 13 MMOL/L — SIGNIFICANT CHANGE UP (ref 7–14)
APTT BLD: 31.7 SEC — SIGNIFICANT CHANGE UP (ref 27–36.3)
AST SERPL-CCNC: 14 U/L — SIGNIFICANT CHANGE UP (ref 4–40)
BILIRUB SERPL-MCNC: 0.3 MG/DL — SIGNIFICANT CHANGE UP (ref 0.2–1.2)
BLD GP AB SCN SERPL QL: NEGATIVE — SIGNIFICANT CHANGE UP
BUN SERPL-MCNC: 26 MG/DL — HIGH (ref 7–23)
BUN SERPL-MCNC: 30 MG/DL — HIGH (ref 7–23)
CALCIUM SERPL-MCNC: 8.4 MG/DL — SIGNIFICANT CHANGE UP (ref 8.4–10.5)
CALCIUM SERPL-MCNC: 8.6 MG/DL — SIGNIFICANT CHANGE UP (ref 8.4–10.5)
CHLORIDE SERPL-SCNC: 97 MMOL/L — LOW (ref 98–107)
CHLORIDE SERPL-SCNC: 97 MMOL/L — LOW (ref 98–107)
CO2 SERPL-SCNC: 24 MMOL/L — SIGNIFICANT CHANGE UP (ref 22–31)
CO2 SERPL-SCNC: 25 MMOL/L — SIGNIFICANT CHANGE UP (ref 22–31)
CREAT SERPL-MCNC: 5.65 MG/DL — HIGH (ref 0.5–1.3)
CREAT SERPL-MCNC: 6.66 MG/DL — HIGH (ref 0.5–1.3)
EGFR: 10 ML/MIN/1.73M2 — LOW
EGFR: 12 ML/MIN/1.73M2 — LOW
GLUCOSE BLDC GLUCOMTR-MCNC: 114 MG/DL — HIGH (ref 70–99)
GLUCOSE BLDC GLUCOMTR-MCNC: 153 MG/DL — HIGH (ref 70–99)
GLUCOSE BLDC GLUCOMTR-MCNC: 178 MG/DL — HIGH (ref 70–99)
GLUCOSE BLDC GLUCOMTR-MCNC: 282 MG/DL — HIGH (ref 70–99)
GLUCOSE SERPL-MCNC: 113 MG/DL — HIGH (ref 70–99)
GLUCOSE SERPL-MCNC: 189 MG/DL — HIGH (ref 70–99)
HCT VFR BLD CALC: 25.1 % — LOW (ref 39–50)
HCT VFR BLD CALC: 25.7 % — LOW (ref 39–50)
HGB BLD-MCNC: 8.1 G/DL — LOW (ref 13–17)
HGB BLD-MCNC: 8.5 G/DL — LOW (ref 13–17)
INR BLD: 1.07 RATIO — SIGNIFICANT CHANGE UP (ref 0.88–1.16)
MAGNESIUM SERPL-MCNC: 2 MG/DL — SIGNIFICANT CHANGE UP (ref 1.6–2.6)
MAGNESIUM SERPL-MCNC: 2.1 MG/DL — SIGNIFICANT CHANGE UP (ref 1.6–2.6)
MCHC RBC-ENTMCNC: 28.5 PG — SIGNIFICANT CHANGE UP (ref 27–34)
MCHC RBC-ENTMCNC: 29.3 PG — SIGNIFICANT CHANGE UP (ref 27–34)
MCHC RBC-ENTMCNC: 32.3 GM/DL — SIGNIFICANT CHANGE UP (ref 32–36)
MCHC RBC-ENTMCNC: 33.1 GM/DL — SIGNIFICANT CHANGE UP (ref 32–36)
MCV RBC AUTO: 88.4 FL — SIGNIFICANT CHANGE UP (ref 80–100)
MCV RBC AUTO: 88.6 FL — SIGNIFICANT CHANGE UP (ref 80–100)
NRBC # BLD: 0 /100 WBCS — SIGNIFICANT CHANGE UP (ref 0–0)
NRBC # BLD: 0 /100 WBCS — SIGNIFICANT CHANGE UP (ref 0–0)
NRBC # FLD: 0.02 K/UL — HIGH (ref 0–0)
NRBC # FLD: 0.02 K/UL — HIGH (ref 0–0)
PHOSPHATE SERPL-MCNC: 4.6 MG/DL — HIGH (ref 2.5–4.5)
PHOSPHATE SERPL-MCNC: 5.5 MG/DL — HIGH (ref 2.5–4.5)
PLATELET # BLD AUTO: 427 K/UL — HIGH (ref 150–400)
PLATELET # BLD AUTO: 470 K/UL — HIGH (ref 150–400)
POTASSIUM SERPL-MCNC: 3.8 MMOL/L — SIGNIFICANT CHANGE UP (ref 3.5–5.3)
POTASSIUM SERPL-MCNC: 4 MMOL/L — SIGNIFICANT CHANGE UP (ref 3.5–5.3)
POTASSIUM SERPL-SCNC: 3.8 MMOL/L — SIGNIFICANT CHANGE UP (ref 3.5–5.3)
POTASSIUM SERPL-SCNC: 4 MMOL/L — SIGNIFICANT CHANGE UP (ref 3.5–5.3)
PROT SERPL-MCNC: 6.6 G/DL — SIGNIFICANT CHANGE UP (ref 6–8.3)
PROTHROM AB SERPL-ACNC: 12.4 SEC — SIGNIFICANT CHANGE UP (ref 10.5–13.4)
RBC # BLD: 2.84 M/UL — LOW (ref 4.2–5.8)
RBC # BLD: 2.9 M/UL — LOW (ref 4.2–5.8)
RBC # FLD: 17.6 % — HIGH (ref 10.3–14.5)
RBC # FLD: 17.8 % — HIGH (ref 10.3–14.5)
RH IG SCN BLD-IMP: POSITIVE — SIGNIFICANT CHANGE UP
SARS-COV-2 RNA SPEC QL NAA+PROBE: SIGNIFICANT CHANGE UP
SODIUM SERPL-SCNC: 134 MMOL/L — LOW (ref 135–145)
SODIUM SERPL-SCNC: 134 MMOL/L — LOW (ref 135–145)
SURGICAL PATHOLOGY STUDY: SIGNIFICANT CHANGE UP
WBC # BLD: 9.14 K/UL — SIGNIFICANT CHANGE UP (ref 3.8–10.5)
WBC # BLD: 9.9 K/UL — SIGNIFICANT CHANGE UP (ref 3.8–10.5)
WBC # FLD AUTO: 9.14 K/UL — SIGNIFICANT CHANGE UP (ref 3.8–10.5)
WBC # FLD AUTO: 9.9 K/UL — SIGNIFICANT CHANGE UP (ref 3.8–10.5)

## 2023-01-19 PROCEDURE — 99232 SBSQ HOSP IP/OBS MODERATE 35: CPT | Mod: 25,24,GC

## 2023-01-19 PROCEDURE — 14020 TIS TRNFR S/A/L 10 SQ CM/<: CPT | Mod: 58,RT

## 2023-01-19 PROCEDURE — 11044 DBRDMT BONE 1ST 20 SQ CM/<: CPT | Mod: 58,59,RT

## 2023-01-19 RX ORDER — INSULIN LISPRO 100/ML
VIAL (ML) SUBCUTANEOUS
Refills: 0 | Status: DISCONTINUED | OUTPATIENT
Start: 2023-01-19 | End: 2023-01-24

## 2023-01-19 RX ORDER — INSULIN LISPRO 100/ML
VIAL (ML) SUBCUTANEOUS AT BEDTIME
Refills: 0 | Status: DISCONTINUED | OUTPATIENT
Start: 2023-01-19 | End: 2023-01-24

## 2023-01-19 RX ORDER — LABETALOL HCL 100 MG
5 TABLET ORAL ONCE
Refills: 0 | Status: COMPLETED | OUTPATIENT
Start: 2023-01-19 | End: 2023-01-19

## 2023-01-19 RX ORDER — MIDAZOLAM HYDROCHLORIDE 1 MG/ML
2 INJECTION, SOLUTION INTRAMUSCULAR; INTRAVENOUS ONCE
Refills: 0 | Status: DISCONTINUED | OUTPATIENT
Start: 2023-01-19 | End: 2023-01-19

## 2023-01-19 RX ORDER — FENTANYL CITRATE 50 UG/ML
25 INJECTION INTRAVENOUS ONCE
Refills: 0 | Status: DISCONTINUED | OUTPATIENT
Start: 2023-01-19 | End: 2023-01-19

## 2023-01-19 RX ORDER — INSULIN GLARGINE 100 [IU]/ML
10 INJECTION, SOLUTION SUBCUTANEOUS AT BEDTIME
Refills: 0 | Status: DISCONTINUED | OUTPATIENT
Start: 2023-01-19 | End: 2023-01-20

## 2023-01-19 RX ADMIN — MIDODRINE HYDROCHLORIDE 10 MILLIGRAM(S): 2.5 TABLET ORAL at 16:44

## 2023-01-19 RX ADMIN — METHADONE HYDROCHLORIDE 10 MILLIGRAM(S): 40 TABLET ORAL at 11:56

## 2023-01-19 RX ADMIN — HYDROMORPHONE HYDROCHLORIDE 4 MILLIGRAM(S): 2 INJECTION INTRAMUSCULAR; INTRAVENOUS; SUBCUTANEOUS at 08:30

## 2023-01-19 RX ADMIN — Medication 975 MILLIGRAM(S): at 22:31

## 2023-01-19 RX ADMIN — CHLORHEXIDINE GLUCONATE 1 APPLICATION(S): 213 SOLUTION TOPICAL at 05:53

## 2023-01-19 RX ADMIN — FENTANYL CITRATE 25 MICROGRAM(S): 50 INJECTION INTRAVENOUS at 09:30

## 2023-01-19 RX ADMIN — HYDROMORPHONE HYDROCHLORIDE 2 MILLIGRAM(S): 2 INJECTION INTRAMUSCULAR; INTRAVENOUS; SUBCUTANEOUS at 17:40

## 2023-01-19 RX ADMIN — HEPARIN SODIUM 5000 UNIT(S): 5000 INJECTION INTRAVENOUS; SUBCUTANEOUS at 05:51

## 2023-01-19 RX ADMIN — Medication 975 MILLIGRAM(S): at 05:51

## 2023-01-19 RX ADMIN — PIPERACILLIN AND TAZOBACTAM 25 GRAM(S): 4; .5 INJECTION, POWDER, LYOPHILIZED, FOR SOLUTION INTRAVENOUS at 05:52

## 2023-01-19 RX ADMIN — MIDAZOLAM HYDROCHLORIDE 2 MILLIGRAM(S): 1 INJECTION, SOLUTION INTRAMUSCULAR; INTRAVENOUS at 08:50

## 2023-01-19 RX ADMIN — INSULIN GLARGINE 10 UNIT(S): 100 INJECTION, SOLUTION SUBCUTANEOUS at 22:32

## 2023-01-19 RX ADMIN — Medication 1: at 22:33

## 2023-01-19 RX ADMIN — Medication 1 TABLET(S): at 11:59

## 2023-01-19 RX ADMIN — Medication 975 MILLIGRAM(S): at 14:19

## 2023-01-19 RX ADMIN — Medication 5 MILLIGRAM(S): at 22:33

## 2023-01-19 RX ADMIN — DORZOLAMIDE HYDROCHLORIDE TIMOLOL MALEATE 1 DROP(S): 20; 5 SOLUTION/ DROPS OPHTHALMIC at 18:34

## 2023-01-19 RX ADMIN — MIDODRINE HYDROCHLORIDE 10 MILLIGRAM(S): 2.5 TABLET ORAL at 05:52

## 2023-01-19 RX ADMIN — MIDODRINE HYDROCHLORIDE 10 MILLIGRAM(S): 2.5 TABLET ORAL at 11:56

## 2023-01-19 RX ADMIN — Medication 81 MILLIGRAM(S): at 11:56

## 2023-01-19 RX ADMIN — DORZOLAMIDE HYDROCHLORIDE TIMOLOL MALEATE 1 DROP(S): 20; 5 SOLUTION/ DROPS OPHTHALMIC at 05:52

## 2023-01-19 RX ADMIN — HYDROMORPHONE HYDROCHLORIDE 4 MILLIGRAM(S): 2 INJECTION INTRAMUSCULAR; INTRAVENOUS; SUBCUTANEOUS at 07:40

## 2023-01-19 RX ADMIN — FENTANYL CITRATE 25 MICROGRAM(S): 50 INJECTION INTRAVENOUS at 09:01

## 2023-01-19 RX ADMIN — PIPERACILLIN AND TAZOBACTAM 25 GRAM(S): 4; .5 INJECTION, POWDER, LYOPHILIZED, FOR SOLUTION INTRAVENOUS at 18:34

## 2023-01-19 RX ADMIN — HYDROMORPHONE HYDROCHLORIDE 2 MILLIGRAM(S): 2 INJECTION INTRAMUSCULAR; INTRAVENOUS; SUBCUTANEOUS at 16:44

## 2023-01-19 RX ADMIN — Medication 2: at 05:52

## 2023-01-19 RX ADMIN — HEPARIN SODIUM 5000 UNIT(S): 5000 INJECTION INTRAVENOUS; SUBCUTANEOUS at 22:31

## 2023-01-19 RX ADMIN — HEPARIN SODIUM 5000 UNIT(S): 5000 INJECTION INTRAVENOUS; SUBCUTANEOUS at 14:18

## 2023-01-19 RX ADMIN — Medication 2: at 11:57

## 2023-01-19 NOTE — PROGRESS NOTE ADULT - ASSESSMENT
47 year old male with a PMHx of DM2, bilateral BKAs and ESRD (on HD - M / W / F) who was transferred from [facility] emergently for evaluation of right finger swelling / pain.  Patient was found to have steal syndrome.  Patient is now S/P revision of arteriovenous dialysis fistula by vascular surgery on 1/13/23.  Repeat right hand / forearm I&D by ortho on 1/14/23, 1/16/23. Under SICU care for hemorrhagic show, now stabilized and pain management for dressing changes requiring conscious sedation.     Interval/Overnight events:   - Dressing change with conscious sedation     PLAN:  NEUROLOGY:  - Pain control: transition to dilaudid PO 2/4/8 for mild/mod/severe pain q4 PRN  - for future dressing changes, consider dilaudid PRN to minimize ketamine requirements  - resume home Methadone 10mg daily      RESPIRATORY:  - No active issues  - Saturating well on room air  - Continuous pulse oximetry  - Maintain O2 saturation >92%    CARDIOVASCULAR:  - RUE bleeding improving  - now off pressors   - continue with home dose Midodrine 10mg TID  - set MAP goal >60, monitor closely for adequate perfusion  - pending repeat ppg and duplex of AVF       GI / NUTRITION:  - Diet: Consistent carb/renal diet; NPO at midnight for OR tomorrow 1/19  - Bowel regimen with lactulose and Dulcolax      RENAL / GENITOURINARY:  - HD today 1/18  - Monitor electrolytes and replete PRN  - Continue to monitor strict ins and outs q1 hour    HEMATOLOGIC:   - s/p 1U pRBC 1/16 prior to OR   - s/p 3U pRBC, ddAVP, 2U plasma, 1U plts 1/17  - 1U pRBC given overnight 1/18 per ortho request  - order additional fluids/blood as needed  - continue with Aspirin  - VTE prophylaxis with HSQ  - continue to monitor H&H on AM labs     INFECTIOUS DISEASE:  - Currently afebrile with leukocytosis, WBC downtrending  - Continue with IV Zosyn    ENDOCRINOLOGY:  - Monitor fingersticks  - S/p insulin gtt, transitioned to Lantus 10U qhs and Admelog 2U TID  - Continue with insulin sliding scale    Disposition: SICU   47 year old male with a PMHx of DM2, bilateral BKAs and ESRD (on HD - M / W / F) who was transferred from [facility] emergently for evaluation of right finger swelling / pain.  Patient was found to have steal syndrome.  Patient is now S/P revision of arteriovenous dialysis fistula by vascular surgery on 1/13/23.  Repeat right hand / forearm I&D by ortho on 1/14/23, 1/16/23. Under SICU care for hemorrhagic show, now stabilized and pain management for dressing changes requiring conscious sedation.     Interval/Overnight events:   - Dressing change with conscious sedation     PLAN:  NEUROLOGY:  - Pain control: transition to dilaudid PO 2/4/8 for mild/mod/severe pain q4 PRN  - for future dressing changes, consider dilaudid PRN to minimize ketamine requirements  - continue home Methadone 10mg daily      RESPIRATORY:  - No active issues  - Saturating well on room air  - Continuous pulse oximetry  - Maintain O2 saturation >92%    CARDIOVASCULAR:  - now off pressors   - continue with home dose Midodrine 10mg TID  - set MAP goal >60, monitor closely for adequate perfusion  - RUE VA duplex performed; patent brachiocephalic AVF      GI / NUTRITION:  - Diet: Consistent carb/renal diet; NPO at midnight for OR today  - Bowel regimen with lactulose and Dulcolax      RENAL / GENITOURINARY:  - HD last 1/18  - Monitor electrolytes and replete PRN  - Continue to monitor strict ins and outs q1 hour    HEMATOLOGIC:   - s/p 1U pRBC 1/16 prior to OR   - s/p 1/17 3U pRBC, ddAVP, 2U plasma, 1U plts, 1/18 1U pRBC   - order additional fluids/blood as needed  - continue with Aspirin  - VTE prophylaxis with HSQ  - continue to monitor H&H on AM labs     INFECTIOUS DISEASE:  - Currently afebrile with leukocytosis, WBC downtrending  - Continue with IV Zosyn    ENDOCRINOLOGY:  - Monitor fingersticks  - S/p insulin gtt, transitioned to Lantus 10U qhs  - Continue with insulin sliding scale    Disposition: SICU

## 2023-01-19 NOTE — PROGRESS NOTE ADULT - ASSESSMENT
47M with steal syndrome s/p AVF proximalization of arterial inflow using right GSV (1/13).    PLAN:   - Duplex reviewed, flow 2850 in proximal segment, with good inflow and outflow  - Please obtain repeat PPG  - Can use AVF as needed  - Hand surgery per ortho - planned for RTOR today  - Rest of care per primary    Vascular Surgery  #[id number]

## 2023-01-19 NOTE — PROGRESS NOTE ADULT - SUBJECTIVE AND OBJECTIVE BOX
ORTHO PROGRESS NOTE     Patient seen and examined. Dressing change overnight without issue. 1U given overnight. Off pressors.      Vital Signs Last 24 Hrs  T(C): 36.1 (19 Jan 2023 06:29), Max: 36.7 (18 Jan 2023 10:18)  T(F): 97 (19 Jan 2023 06:29), Max: 98 (18 Jan 2023 10:18)  HR: 61 (19 Jan 2023 07:00) (61 - 79)  BP: 126/50 (19 Jan 2023 06:29) (126/50 - 160/63)  BP(mean): 97 (18 Jan 2023 21:10) (92 - 101)  RR: 13 (19 Jan 2023 07:00) (11 - 26)  SpO2: 100% (19 Jan 2023 07:00) (98% - 100%)    Parameters below as of 19 Jan 2023 07:00  Patient On (Oxygen Delivery Method): room air      Gen: NAD  Resp: Nonlabored  R UE:    Oozing from index finger  moving fingers  Tenderness distal tips of fingers  Rest of hand WWP  No pain proximally                            8.1    9.90  )-----------( 427      ( 19 Jan 2023 01:40 )             25.1       01-19    134<L>  |  97<L>  |  26<H>  ----------------------------<  189<H>  3.8   |  25  |  5.65<H>    Ca    8.4      19 Jan 2023 01:40  Phos  4.6     01-19  Mg     2.00     01-19        AA/P: 47y y/o Male 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 Repeat I+D on 1/14 and 1/16        - Plan OR today 1/19 for rpt I&D   -Pain control/analgesia  -DVT ppx - Venodynes/HSQ  -FU AM Labs  -Non-weight bearing right upper extremity in bulky dressing  -Daily dressing and packing changes BID  -FU nephrology and HD (MWF)  -FU ID recs (zosyn)  -FU Vasc surgery recs, s/p fistulogram and duplex, needs repeat duplex  -Appreciate optho recs: outpatient follow-up  -Appreciate SICU level of care, q1HR neurovascular checks.

## 2023-01-19 NOTE — PROGRESS NOTE ADULT - SUBJECTIVE AND OBJECTIVE BOX
SICU Daily Progress Note  =====================================================  Interval/Overnight Events:  Dressing change with conscious sedation.    ALLERGIES:  No Known Drug Allergies  [place] (Rash)      --------------------------------------------------------------------------------------    MEDICATIONS:    Neurologic Medications  acetaminophen     Tablet .. 975 milliGRAM(s) Oral every 8 hours  HYDROmorphone   Tablet 2 milliGRAM(s) Oral every 4 hours PRN Mild Pain (1 - 3)  HYDROmorphone   Tablet 4 milliGRAM(s) Oral every 4 hours PRN Moderate Pain (4 - 6)  HYDROmorphone   Tablet 8 milliGRAM(s) Oral every 4 hours PRN Severe Pain (7 - 10)  methadone    Tablet 10 milliGRAM(s) Oral daily    Respiratory Medications    Cardiovascular Medications  midodrine. 10 milliGRAM(s) Oral three times a day    Gastrointestinal Medications  bisacodyl Suppository 10 milliGRAM(s) Rectal daily PRN If no bowel movement  lactulose Syrup 10 Gram(s) Oral daily PRN Constipation  multivitamin 1 Tablet(s) Oral daily    Genitourinary Medications    Hematologic/Oncologic Medications  aspirin  chewable 81 milliGRAM(s) Oral daily  epoetin deidre-epbx (RETACRIT) Injectable 38290 Unit(s) IV Push <User Schedule>  heparin   Injectable 5000 Unit(s) SubCutaneous every 8 hours    Antimicrobial/Immunologic Medications  piperacillin/tazobactam IVPB.. 3.375 Gram(s) IV Intermittent every 12 hours    Endocrine/Metabolic Medications  insulin lispro (ADMELOG) corrective regimen sliding scale   SubCutaneous three times a day before meals  insulin lispro (ADMELOG) corrective regimen sliding scale   SubCutaneous at bedtime    Topical/Other Medications  chlorhexidine 2% Cloths 1 Application(s) Topical <User Schedule>  dorzolamide 2%/timolol 0.5% Ophthalmic Solution 1 Drop(s) Left EYE two times a day    --------------------------------------------------------------------------------------    VITAL SIGNS:  ICU Vital Signs Last 24 Hrs  T(C): 36 (19 Jan 2023 00:00), Max: 36.7 (18 Jan 2023 10:18)  T(F): 96.8 (19 Jan 2023 00:00), Max: 98 (18 Jan 2023 10:18)  HR: 66 (19 Jan 2023 03:00) (63 - 79)  BP: 157/58 (18 Jan 2023 21:10) (142/75 - 160/63)  BP(mean): 97 (18 Jan 2023 21:10) (92 - 101)  ABP: 135/55 (19 Jan 2023 03:00) (112/40 - 165/59)  ABP(mean): 86 (19 Jan 2023 03:00) (67 - 102)  RR: 15 (19 Jan 2023 03:00) (12 - 26)  SpO2: 99% (19 Jan 2023 03:00) (99% - 100%)    O2 Parameters below as of 19 Jan 2023 03:00  Patient On (Oxygen Delivery Method): room air      --------------------------------------------------------------------------------------    INS AND OUTS:  I&O's Detail    17 Jan 2023 07:01  -  18 Jan 2023 07:00  --------------------------------------------------------  IN:    Insulin: 4 mL    Insulin: 4.5 mL    IV PiggyBack: 150 mL    Oral Fluid: 550 mL    Plasma: 656 mL    Platelets - Single Donor: 307 mL    PRBCs (Packed Red Blood Cells): 301 mL  Total IN: 1972.5 mL    OUT:  Total OUT: 0 mL    Total NET: 1972.5 mL      18 Jan 2023 07:01  -  19 Jan 2023 03:31  --------------------------------------------------------  IN:    IV PiggyBack: 100 mL    Oral Fluid: 550 mL    Other (mL): 400 mL  Total IN: 1050 mL    OUT:    Other (mL): 900 mL  Total OUT: 900 mL    Total NET: 150 mL    --------------------------------------------------------------------------------------    EXAM  NEUROLOGY  RASS:   	GCS:    Exam: Normal, in no acute distress.  Alert and oriented x4.  No focal neurologic deficits. ***    HEENT  Exam: Normocephalic, atraumatic, EOMI.  ***    RESPIRATORY  Exam: Lungs clear to auscultation, Normal expansion/effort. ***  Mechanical Ventilation:     CARDIOVASCULAR  Exam: S1, S2.  Regular rate and rhythm.   ***    GI/NUTRITION  Exam: Abdomen soft, Non-tender, Non-distended.    Current Diet: NPO for OR    VASCULAR  Exam: Extremities warm, pink, well-perfused.    MUSCULOSKELETAL  Exam: Bilateral BKA    SKIN  Exam: Good skin turgor, no skin breakdown.    METABOLIC / FLUIDS / ELECTROLYTES  multivitamin 1 Tablet(s) Oral daily      HEMATOLOGIC  [x] VTE Prophylaxis: aspirin  chewable 81 milliGRAM(s) Oral daily  heparin   Injectable 5000 Unit(s) SubCutaneous every 8 hours    Transfusions:	[] PRBC	[] Platelets		[] FFP	[] Cryoprecipitate    INFECTIOUS DISEASE  Antimicrobials/Immunologic Medications:  epoetin deidre-epbx (RETACRIT) Injectable 23427 Unit(s) IV Push <User Schedule>  piperacillin/tazobactam IVPB.. 3.375 Gram(s) IV Intermittent every 12 hours    Day #      of     ***    TUBES / LINES / DRAINS  ***  [x] Peripheral IV  [x] Necessity of urinary, arterial, and venous catheters discussed    --------------------------------------------------------------------------------------    LABS                          8.1    9.90  )-----------( 427      ( 19 Jan 2023 01:40 )             25.1   01-19    134<L>  |  97<L>  |  26<H>  ----------------------------<  189<H>  3.8   |  25  |  5.65<H>    Ca    8.4      19 Jan 2023 01:40  Phos  4.6     01-19  Mg     2.00     01-19      --------------------------------------------------------------------------------------    OTHER LABORATORY:     IMAGING STUDIES:   CXR:        SICU Daily Progress Note  =====================================================  Interval/Overnight Events:  Dressing change with conscious sedation.    ALLERGIES:  No Known Drug Allergies  [place] (Rash)      --------------------------------------------------------------------------------------    MEDICATIONS:    Neurologic Medications  acetaminophen     Tablet .. 975 milliGRAM(s) Oral every 8 hours  HYDROmorphone   Tablet 2 milliGRAM(s) Oral every 4 hours PRN Mild Pain (1 - 3)  HYDROmorphone   Tablet 4 milliGRAM(s) Oral every 4 hours PRN Moderate Pain (4 - 6)  HYDROmorphone   Tablet 8 milliGRAM(s) Oral every 4 hours PRN Severe Pain (7 - 10)  methadone    Tablet 10 milliGRAM(s) Oral daily    Respiratory Medications    Cardiovascular Medications  midodrine. 10 milliGRAM(s) Oral three times a day    Gastrointestinal Medications  bisacodyl Suppository 10 milliGRAM(s) Rectal daily PRN If no bowel movement  lactulose Syrup 10 Gram(s) Oral daily PRN Constipation  multivitamin 1 Tablet(s) Oral daily    Genitourinary Medications    Hematologic/Oncologic Medications  aspirin  chewable 81 milliGRAM(s) Oral daily  epoetin deidre-epbx (RETACRIT) Injectable 03459 Unit(s) IV Push <User Schedule>  heparin   Injectable 5000 Unit(s) SubCutaneous every 8 hours    Antimicrobial/Immunologic Medications  piperacillin/tazobactam IVPB.. 3.375 Gram(s) IV Intermittent every 12 hours    Endocrine/Metabolic Medications  insulin lispro (ADMELOG) corrective regimen sliding scale   SubCutaneous three times a day before meals  insulin lispro (ADMELOG) corrective regimen sliding scale   SubCutaneous at bedtime    Topical/Other Medications  chlorhexidine 2% Cloths 1 Application(s) Topical <User Schedule>  dorzolamide 2%/timolol 0.5% Ophthalmic Solution 1 Drop(s) Left EYE two times a day    --------------------------------------------------------------------------------------    VITAL SIGNS:  ICU Vital Signs Last 24 Hrs  T(C): 36 (19 Jan 2023 00:00), Max: 36.7 (18 Jan 2023 10:18)  T(F): 96.8 (19 Jan 2023 00:00), Max: 98 (18 Jan 2023 10:18)  HR: 66 (19 Jan 2023 03:00) (63 - 79)  BP: 157/58 (18 Jan 2023 21:10) (142/75 - 160/63)  BP(mean): 97 (18 Jan 2023 21:10) (92 - 101)  ABP: 135/55 (19 Jan 2023 03:00) (112/40 - 165/59)  ABP(mean): 86 (19 Jan 2023 03:00) (67 - 102)  RR: 15 (19 Jan 2023 03:00) (12 - 26)  SpO2: 99% (19 Jan 2023 03:00) (99% - 100%)    O2 Parameters below as of 19 Jan 2023 03:00  Patient On (Oxygen Delivery Method): room air      --------------------------------------------------------------------------------------    INS AND OUTS:  I&O's Detail    17 Jan 2023 07:01  -  18 Jan 2023 07:00  --------------------------------------------------------  IN:    Insulin: 4 mL    Insulin: 4.5 mL    IV PiggyBack: 150 mL    Oral Fluid: 550 mL    Plasma: 656 mL    Platelets - Single Donor: 307 mL    PRBCs (Packed Red Blood Cells): 301 mL  Total IN: 1972.5 mL    OUT:  Total OUT: 0 mL    Total NET: 1972.5 mL      18 Jan 2023 07:01  -  19 Jan 2023 03:31  --------------------------------------------------------  IN:    IV PiggyBack: 100 mL    Oral Fluid: 550 mL    Other (mL): 400 mL  Total IN: 1050 mL    OUT:    Other (mL): 900 mL  Total OUT: 900 mL    Total NET: 150 mL    --------------------------------------------------------------------------------------    EXAM  NEUROLOGY  RASS:   	GCS:    Exam: Normal, in no acute distress.  Alert and oriented x4.  Able to move all extremities spontaneously.     HEENT  Exam: Normocephalic, atraumatic, EOMI.      RESPIRATORY  Exam: Lungs clear to auscultation, Normal expansion/effort. On room air     CARDIOVASCULAR  Exam: S1, S2.  Regular rate and rhythm.      GI/NUTRITION  Exam: Abdomen soft, Non-tender, Non-distended.    Current Diet: NPO for OR    VASCULAR  Exam: Extremities warm, pink, well-perfused.     MUSCULOSKELETAL  Exam: Bilateral BKA. RUE dressing clean/dry/intact.     SKIN  Exam: Good skin turgor, no skin breakdown.    METABOLIC / FLUIDS / ELECTROLYTES  multivitamin 1 Tablet(s) Oral daily      HEMATOLOGIC  [x] VTE Prophylaxis: aspirin  chewable 81 milliGRAM(s) Oral daily  heparin   Injectable 5000 Unit(s) SubCutaneous every 8 hours    Transfusions:	[] PRBC	[] Platelets		[] FFP	[] Cryoprecipitate    INFECTIOUS DISEASE  Antimicrobials/Immunologic Medications:  epoetin deidre-epbx (RETACRIT) Injectable 40424 Unit(s) IV Push <User Schedule>  piperacillin/tazobactam IVPB.. 3.375 Gram(s) IV Intermittent every 12 hours    Day #      of     ***    TUBES / LINES / DRAINS  ***  [x] Peripheral IV  [x] Necessity of urinary, arterial, and venous catheters discussed    --------------------------------------------------------------------------------------    LABS                          8.1    9.90  )-----------( 427      ( 19 Jan 2023 01:40 )             25.1   01-19    134<L>  |  97<L>  |  26<H>  ----------------------------<  189<H>  3.8   |  25  |  5.65<H>    Ca    8.4      19 Jan 2023 01:40  Phos  4.6     01-19  Mg     2.00     01-19      --------------------------------------------------------------------------------------    OTHER LABORATORY:     IMAGING STUDIES:   CXR:

## 2023-01-19 NOTE — CHART NOTE - NSCHARTNOTEFT_GEN_A_CORE
ORTHOPEDIC SURGERY POST-OP CHECK    S: Patient seen and examined at bedside POD0 s/p R hand I+D w/ partial closure Pain well controlled with current regimen. Denies numbness/tingling in the extremity. Denies fever, chills, shortness of breath, and chest pain.     O: T(C): 37 (01-19-23 @ 20:00), Max: 37 (01-19-23 @ 20:00)  HR: 61 (01-19-23 @ 20:00) (54 - 71)  BP: 181/78 (01-19-23 @ 20:00) (126/50 - 181/78)  RR: 15 (01-19-23 @ 20:00) (10 - 21)  SpO2: 100% (01-19-23 @ 20:00) (98% - 100%)    Exam:   Gen: NAD, resting in bed  Resp: unlabored breathing  RUE: dressing c/d/i      moving fingers      Tenderness distal tips of fingers       Rest of hand WWP      No pain proximally             01-18-23 @ 07:01 - 01-19-23 @ 07:00  --------------------------------------------------------  IN: 1150 mL / OUT: 900 mL / NET: 250 mL    01-19-23 @ 07:01  - 01-19-23 @ 21:05  --------------------------------------------------------  IN: 350 mL / OUT: 0 mL / NET: 350 mL          A/P: 47yMale POD0 s/p R hand I+D w/ partial closure  recovering well  - Pain control  - NWB LUE  - DVT ppx:  SQH  - PT/OT  - OOB/AAT  - Regular diet  - Monitor I&Os

## 2023-01-19 NOTE — PROGRESS NOTE ADULT - NUTRITIONAL ASSESSMENT
MEDICATIONS  (STANDING):  acetaminophen     Tablet .. 975 milliGRAM(s) Oral every 8 hours  aspirin  chewable 81 milliGRAM(s) Oral daily  chlorhexidine 2% Cloths 1 Application(s) Topical <User Schedule>  chlorhexidine 2% Cloths 1 Application(s) Topical daily  dextrose 5%. 1000 milliLiter(s) (100 mL/Hr) IV Continuous <Continuous>  dextrose 5%. 1000 milliLiter(s) (50 mL/Hr) IV Continuous <Continuous>  dextrose 5%. 1000 milliLiter(s) (100 mL/Hr) IV Continuous <Continuous>  dextrose 5%. 1000 milliLiter(s) (50 mL/Hr) IV Continuous <Continuous>  dextrose 50% Injectable 25 Gram(s) IV Push once  dextrose 50% Injectable 12.5 Gram(s) IV Push once  dextrose 50% Injectable 25 Gram(s) IV Push once  dextrose 50% Injectable 25 Gram(s) IV Push once  dextrose 50% Injectable 12.5 Gram(s) IV Push once  dextrose 50% Injectable 25 Gram(s) IV Push once  dorzolamide 2%/timolol 0.5% Ophthalmic Solution 1 Drop(s) Left EYE two times a day  epoetin deidre-epbx (RETACRIT) Injectable 52549 Unit(s) IV Push <User Schedule>  famotidine    Tablet 20 milliGRAM(s) Oral every 12 hours  glucagon  Injectable 1 milliGRAM(s) IntraMuscular once  glucagon  Injectable 1 milliGRAM(s) IntraMuscular once  insulin glargine Injectable (LANTUS) 5 Unit(s) SubCutaneous at bedtime  insulin lispro (ADMELOG) corrective regimen sliding scale   SubCutaneous three times a day before meals  lactated ringers. 1000 milliLiter(s) (75 mL/Hr) IV Continuous <Continuous>  midodrine. 10 milliGRAM(s) Oral three times a day  multivitamin 1 Tablet(s) Oral daily  mupirocin 2% Ointment 1 Application(s) Both Nostrils two times a day  piperacillin/tazobactam IVPB.. 3.375 Gram(s) IV Intermittent every 12 hours

## 2023-01-19 NOTE — PROGRESS NOTE ADULT - SUBJECTIVE AND OBJECTIVE BOX
SURGERY  Pager: #[phone]    INTERVAL EVENTS/SUBJECTIVE: No acute events overnight.     ______________________________________________  OBJECTIVE:   T(C): 36.1 (01-19-23 @ 08:00), Max: 36.6 (01-18-23 @ 12:00)  HR: 57 (01-19-23 @ 09:10) (56 - 79)  BP: 126/50 (01-19-23 @ 06:29) (126/50 - 160/63)  RR: 10 (01-19-23 @ 09:10) (10 - 26)  SpO2: 100% (01-19-23 @ 09:10) (98% - 100%)  Wt(kg): --  CAPILLARY BLOOD GLUCOSE      POCT Blood Glucose.: 178 mg/dL (19 Jan 2023 05:49)  POCT Blood Glucose.: 186 mg/dL (18 Jan 2023 22:59)  POCT Blood Glucose.: 192 mg/dL (18 Jan 2023 16:26)  POCT Blood Glucose.: 80 mg/dL (18 Jan 2023 11:35)    I&O's Detail    18 Jan 2023 07:01  -  19 Jan 2023 07:00  --------------------------------------------------------  IN:    IV PiggyBack: 200 mL    Oral Fluid: 550 mL    Other (mL): 400 mL  Total IN: 1150 mL    OUT:    Other (mL): 900 mL  Total OUT: 900 mL    Total NET: 250 mL          Physical exam:   General: Appears well, NAD. Non toxic appearing, sitting up in bed   Abdomen: soft, nontender, nondistended, no rebound or guarding  Extremities: RUE in ACE bandage with fingers missing. Oozing from around bandages.   ______________________________________________  LABS:  CBC Full  -  ( 19 Jan 2023 01:40 )  WBC Count : 9.90 K/uL  RBC Count : 2.84 M/uL  Hemoglobin : 8.1 g/dL  Hematocrit : 25.1 %  Platelet Count - Automated : 427 K/uL  Mean Cell Volume : 88.4 fL  Mean Cell Hemoglobin : 28.5 pg  Mean Cell Hemoglobin Concentration : 32.3 gm/dL  Auto Neutrophil # : x  Auto Lymphocyte # : x  Auto Monocyte # : x  Auto Eosinophil # : x  Auto Basophil # : x  Auto Neutrophil % : x  Auto Lymphocyte % : x  Auto Monocyte % : x  Auto Eosinophil % : x  Auto Basophil % : x    01-19    134<L>  |  97<L>  |  26<H>  ----------------------------<  189<H>  3.8   |  25  |  5.65<H>    Ca    8.4      19 Jan 2023 01:40  Phos  4.6     01-19  Mg     2.00     01-19      _____________________________________________  RADIOLOGY:

## 2023-01-19 NOTE — PROGRESS NOTE ADULT - ASSESSMENT
Patient is a 46 y/o male PMH DM2, Bilateral BKA, ESRD (on HD MWF), last dialyzed yesterday transferred from [facility] emergently for evaluation of evaluation of right finger swelling/pain. Patient reports history of right finger pain after he had a fall one year ago. Patient had a wound on her second/middle finger who he was seeing a hand surgeon for outpatient but unable ultimately to continue seeing due to insurance issues. Patient reports his middle finger developed increased swelling and became black in color about two weeks ago. Denies fevers. Patient transferred to [facility] for further evaluation and emergent OR. Patient received broad spectrum Abx of Zosyn/vanco/clindamycin at [facility]. Interpretor phone used for translation with patient. ID# [id number]         septic workup and ID evaluation,send blood and urine cx,serial lactate levels,monitor vitals closley,ivfs hydration,monitor urine output and renal profile,iv abx as per id cons    esrd on hd   Excess fluids and waste products will be removed from your blood; your electrolytes will be balanced; your blood pressure will be controlled.      BP monitoring,continue current antihypertensive meds, low salt diet,followup with PMD in 1-2 weeks      ANEMIA PLAN:  Anemia of chronic disease:  Well controlled by epo  H and H subtherapeutic .  We will continue epo aiming for a HCT of 32-36 %.   We will monitor Iron stores, B12 and RBC folate .  epoetin deidre-epbx (RETACRIT) Injectable 53561 Unit(s) IV Push     Accuchecks monitoring and insulin sliding scale coverage, no concentrated sweets diet, serial labs and f/up with PMD, monitor HB A 1 C every 3-4 mnth

## 2023-01-20 LAB
ANION GAP SERPL CALC-SCNC: 18 MMOL/L — HIGH (ref 7–14)
BUN SERPL-MCNC: 35 MG/DL — HIGH (ref 7–23)
CALCIUM SERPL-MCNC: 8.6 MG/DL — SIGNIFICANT CHANGE UP (ref 8.4–10.5)
CHLORIDE SERPL-SCNC: 95 MMOL/L — LOW (ref 98–107)
CO2 SERPL-SCNC: 19 MMOL/L — LOW (ref 22–31)
CREAT SERPL-MCNC: 7.56 MG/DL — HIGH (ref 0.5–1.3)
EGFR: 8 ML/MIN/1.73M2 — LOW
GLUCOSE BLDC GLUCOMTR-MCNC: 173 MG/DL — HIGH (ref 70–99)
GLUCOSE BLDC GLUCOMTR-MCNC: 241 MG/DL — HIGH (ref 70–99)
GLUCOSE BLDC GLUCOMTR-MCNC: 243 MG/DL — HIGH (ref 70–99)
GLUCOSE BLDC GLUCOMTR-MCNC: 260 MG/DL — HIGH (ref 70–99)
GLUCOSE BLDC GLUCOMTR-MCNC: 287 MG/DL — HIGH (ref 70–99)
GLUCOSE SERPL-MCNC: 290 MG/DL — HIGH (ref 70–99)
HCT VFR BLD CALC: 26.7 % — LOW (ref 39–50)
HGB BLD-MCNC: 8.5 G/DL — LOW (ref 13–17)
MAGNESIUM SERPL-MCNC: 2.2 MG/DL — SIGNIFICANT CHANGE UP (ref 1.6–2.6)
MCHC RBC-ENTMCNC: 29.2 PG — SIGNIFICANT CHANGE UP (ref 27–34)
MCHC RBC-ENTMCNC: 31.8 GM/DL — LOW (ref 32–36)
MCV RBC AUTO: 91.8 FL — SIGNIFICANT CHANGE UP (ref 80–100)
NRBC # BLD: 0 /100 WBCS — SIGNIFICANT CHANGE UP (ref 0–0)
NRBC # FLD: 0 K/UL — SIGNIFICANT CHANGE UP (ref 0–0)
PHOSPHATE SERPL-MCNC: 6.9 MG/DL — HIGH (ref 2.5–4.5)
PLATELET # BLD AUTO: 527 K/UL — HIGH (ref 150–400)
POTASSIUM SERPL-MCNC: 4.9 MMOL/L — SIGNIFICANT CHANGE UP (ref 3.5–5.3)
POTASSIUM SERPL-SCNC: 4.9 MMOL/L — SIGNIFICANT CHANGE UP (ref 3.5–5.3)
RBC # BLD: 2.91 M/UL — LOW (ref 4.2–5.8)
RBC # FLD: 17.6 % — HIGH (ref 10.3–14.5)
SODIUM SERPL-SCNC: 132 MMOL/L — LOW (ref 135–145)
WBC # BLD: 11.07 K/UL — HIGH (ref 3.8–10.5)
WBC # FLD AUTO: 11.07 K/UL — HIGH (ref 3.8–10.5)

## 2023-01-20 PROCEDURE — 99232 SBSQ HOSP IP/OBS MODERATE 35: CPT

## 2023-01-20 PROCEDURE — 99233 SBSQ HOSP IP/OBS HIGH 50: CPT

## 2023-01-20 RX ORDER — MIDAZOLAM HYDROCHLORIDE 1 MG/ML
2 INJECTION, SOLUTION INTRAMUSCULAR; INTRAVENOUS ONCE
Refills: 0 | Status: DISCONTINUED | OUTPATIENT
Start: 2023-01-20 | End: 2023-01-20

## 2023-01-20 RX ORDER — FENTANYL CITRATE 50 UG/ML
25 INJECTION INTRAVENOUS ONCE
Refills: 0 | Status: DISCONTINUED | OUTPATIENT
Start: 2023-01-20 | End: 2023-01-20

## 2023-01-20 RX ORDER — INSULIN LISPRO 100/ML
4 VIAL (ML) SUBCUTANEOUS
Refills: 0 | Status: DISCONTINUED | OUTPATIENT
Start: 2023-01-20 | End: 2023-01-27

## 2023-01-20 RX ORDER — INSULIN GLARGINE 100 [IU]/ML
14 INJECTION, SOLUTION SUBCUTANEOUS AT BEDTIME
Refills: 0 | Status: DISCONTINUED | OUTPATIENT
Start: 2023-01-20 | End: 2023-01-23

## 2023-01-20 RX ORDER — INSULIN LISPRO 100/ML
3 VIAL (ML) SUBCUTANEOUS
Refills: 0 | Status: DISCONTINUED | OUTPATIENT
Start: 2023-01-20 | End: 2023-01-20

## 2023-01-20 RX ADMIN — FENTANYL CITRATE 25 MICROGRAM(S): 50 INJECTION INTRAVENOUS at 12:33

## 2023-01-20 RX ADMIN — Medication 3 UNIT(S): at 11:55

## 2023-01-20 RX ADMIN — Medication 975 MILLIGRAM(S): at 22:00

## 2023-01-20 RX ADMIN — Medication 3 UNIT(S): at 07:00

## 2023-01-20 RX ADMIN — Medication 975 MILLIGRAM(S): at 05:58

## 2023-01-20 RX ADMIN — Medication 3 UNIT(S): at 17:22

## 2023-01-20 RX ADMIN — HEPARIN SODIUM 5000 UNIT(S): 5000 INJECTION INTRAVENOUS; SUBCUTANEOUS at 21:53

## 2023-01-20 RX ADMIN — Medication 6: at 17:21

## 2023-01-20 RX ADMIN — HEPARIN SODIUM 5000 UNIT(S): 5000 INJECTION INTRAVENOUS; SUBCUTANEOUS at 17:20

## 2023-01-20 RX ADMIN — DORZOLAMIDE HYDROCHLORIDE TIMOLOL MALEATE 1 DROP(S): 20; 5 SOLUTION/ DROPS OPHTHALMIC at 17:23

## 2023-01-20 RX ADMIN — MIDODRINE HYDROCHLORIDE 10 MILLIGRAM(S): 2.5 TABLET ORAL at 19:03

## 2023-01-20 RX ADMIN — HYDROMORPHONE HYDROCHLORIDE 8 MILLIGRAM(S): 2 INJECTION INTRAMUSCULAR; INTRAVENOUS; SUBCUTANEOUS at 12:39

## 2023-01-20 RX ADMIN — Medication 1 TABLET(S): at 11:38

## 2023-01-20 RX ADMIN — HYDROMORPHONE HYDROCHLORIDE 8 MILLIGRAM(S): 2 INJECTION INTRAMUSCULAR; INTRAVENOUS; SUBCUTANEOUS at 11:38

## 2023-01-20 RX ADMIN — Medication 6: at 07:54

## 2023-01-20 RX ADMIN — PIPERACILLIN AND TAZOBACTAM 25 GRAM(S): 4; .5 INJECTION, POWDER, LYOPHILIZED, FOR SOLUTION INTRAVENOUS at 05:58

## 2023-01-20 RX ADMIN — HEPARIN SODIUM 5000 UNIT(S): 5000 INJECTION INTRAVENOUS; SUBCUTANEOUS at 06:43

## 2023-01-20 RX ADMIN — FENTANYL CITRATE 25 MICROGRAM(S): 50 INJECTION INTRAVENOUS at 12:39

## 2023-01-20 RX ADMIN — Medication 975 MILLIGRAM(S): at 17:21

## 2023-01-20 RX ADMIN — Medication 81 MILLIGRAM(S): at 11:38

## 2023-01-20 RX ADMIN — MIDODRINE HYDROCHLORIDE 10 MILLIGRAM(S): 2.5 TABLET ORAL at 11:38

## 2023-01-20 RX ADMIN — PIPERACILLIN AND TAZOBACTAM 25 GRAM(S): 4; .5 INJECTION, POWDER, LYOPHILIZED, FOR SOLUTION INTRAVENOUS at 17:20

## 2023-01-20 RX ADMIN — Medication 4: at 11:39

## 2023-01-20 RX ADMIN — INSULIN GLARGINE 14 UNIT(S): 100 INJECTION, SOLUTION SUBCUTANEOUS at 22:31

## 2023-01-20 RX ADMIN — MIDAZOLAM HYDROCHLORIDE 2 MILLIGRAM(S): 1 INJECTION, SOLUTION INTRAMUSCULAR; INTRAVENOUS at 12:33

## 2023-01-20 RX ADMIN — ERYTHROPOIETIN 10000 UNIT(S): 10000 INJECTION, SOLUTION INTRAVENOUS; SUBCUTANEOUS at 18:33

## 2023-01-20 RX ADMIN — Medication 975 MILLIGRAM(S): at 21:54

## 2023-01-20 RX ADMIN — CHLORHEXIDINE GLUCONATE 1 APPLICATION(S): 213 SOLUTION TOPICAL at 06:43

## 2023-01-20 RX ADMIN — DORZOLAMIDE HYDROCHLORIDE TIMOLOL MALEATE 1 DROP(S): 20; 5 SOLUTION/ DROPS OPHTHALMIC at 05:59

## 2023-01-20 NOTE — PROGRESS NOTE ADULT - SUBJECTIVE AND OBJECTIVE BOX
Orthopaedic Surgery Progress Note    Subjective:   Patient seen and examined. No acute events overnight. Dressing change performed at bedside with sedation and patient still experiencing significant pain with two medications. States pain is controlled at baseline. Denies fever/chills/chest pain/shortness of breath/numbness/tingling.    Objective:  T(C): 36 (01-20-23 @ 12:00), Max: 37 (01-19-23 @ 20:00)  HR: 74 (01-20-23 @ 12:00) (54 - 74)  BP: 181/78 (01-19-23 @ 20:00) (181/78 - 181/78)  RR: 15 (01-20-23 @ 12:00) (9 - 25)  SpO2: 96% (01-20-23 @ 12:00) (96% - 100%)  Wt(kg): --    01-19 @ 07:01  -  01-20 @ 07:00  --------------------------------------------------------  IN: 700 mL / OUT: 0 mL / NET: 700 mL        Physical Exam:    Gen: NAD  Resp: Nonlabored  R UE:  Nylon sutures intact on dorsal and volar aspect of forearm/wrist/hand  Exposed tendons on volar aspect of hand  3rd ray stump clean with bloody drainage, no signs of infection  Oozing from index finger  Moving fingers  Tenderness distal tips of fingers  Rest of hand WWP  No pain proximally                          8.5    11.07 )-----------( 527      ( 20 Jan 2023 02:10 )             26.7     01-20    132<L>  |  95<L>  |  35<H>  ----------------------------<  290<H>  4.9   |  19<L>  |  7.56<H>    Ca    8.6      20 Jan 2023 02:10  Phos  6.9     01-20  Mg     2.20     01-20    TPro  6.6  /  Alb  3.0<L>  /  TBili  0.3  /  DBili  x   /  AST  14  /  ALT  9   /  AlkPhos  77  01-19    PT/INR - ( 19 Jan 2023 01:40 )   PT: 12.4 sec;   INR: 1.07 ratio         PTT - ( 19 Jan 2023 01:40 )  PTT:31.7 sec

## 2023-01-20 NOTE — PROGRESS NOTE ADULT - SUBJECTIVE AND OBJECTIVE BOX
Follow Up: hand infection    Interval History/ROS: Feels pretty good, pain controlled, no fevers, no diarrhea.     Allergies  No Known Drug Allergies  [place] (Rash)        ANTIMICROBIALS:  piperacillin/tazobactam IVPB.. 3.375 every 12 hours      OTHER MEDS:  acetaminophen     Tablet .. 975 milliGRAM(s) Oral every 8 hours  aspirin  chewable 81 milliGRAM(s) Oral daily  bisacodyl Suppository 10 milliGRAM(s) Rectal daily PRN  chlorhexidine 2% Cloths 1 Application(s) Topical <User Schedule>  dorzolamide 2%/timolol 0.5% Ophthalmic Solution 1 Drop(s) Left EYE two times a day  epoetin deidre-epbx (RETACRIT) Injectable 81089 Unit(s) IV Push <User Schedule>  heparin   Injectable 5000 Unit(s) SubCutaneous every 8 hours  HYDROmorphone   Tablet 2 milliGRAM(s) Oral every 4 hours PRN  HYDROmorphone   Tablet 4 milliGRAM(s) Oral every 4 hours PRN  HYDROmorphone   Tablet 8 milliGRAM(s) Oral every 4 hours PRN  insulin glargine Injectable (LANTUS) 10 Unit(s) SubCutaneous at bedtime  insulin lispro (ADMELOG) corrective regimen sliding scale   SubCutaneous three times a day before meals  insulin lispro (ADMELOG) corrective regimen sliding scale   SubCutaneous at bedtime  insulin lispro Injectable (ADMELOG) 3 Unit(s) SubCutaneous Before meals and at bedtime  lactulose Syrup 10 Gram(s) Oral daily PRN  methadone    Tablet 10 milliGRAM(s) Oral daily  midodrine. 10 milliGRAM(s) Oral three times a day  multivitamin 1 Tablet(s) Oral daily      Vital Signs Last 24 Hrs  T(C): 36.3 (20 Jan 2023 17:05), Max: 37 (19 Jan 2023 20:00)  T(F): 97.3 (20 Jan 2023 17:05), Max: 98.6 (19 Jan 2023 20:00)  HR: 62 (20 Jan 2023 17:05) (60 - 74)  BP: 127/65 (20 Jan 2023 16:00) (94/53 - 181/78)  BP(mean): 83 (20 Jan 2023 16:00) (67 - 103)  RR: 16 (20 Jan 2023 17:05) (9 - 25)  SpO2: 100% (20 Jan 2023 17:05) (92% - 100%)    Parameters below as of 20 Jan 2023 17:05  Patient On (Oxygen Delivery Method): room air        Physical Exam:  General: non toxic, alert   Cardio: regular rate   Respiratory: nonlabored on room air   abd: nondistended soft  Musculoskeletal: right hand dressed. bilateral BKA   vascular: right arm AVF nontender   Skin: no rash                          8.5    11.07 )-----------( 527      ( 20 Jan 2023 02:10 )             26.7       01-20    132<L>  |  95<L>  |  35<H>  ----------------------------<  290<H>  4.9   |  19<L>  |  7.56<H>    Ca    8.6      20 Jan 2023 02:10  Phos  6.9     01-20  Mg     2.20     01-20    TPro  6.6  /  Alb  3.0<L>  /  TBili  0.3  /  DBili  x   /  AST  14  /  ALT  9   /  AlkPhos  77  01-19          MICROBIOLOGY:  Culture - Fungal, Other (collected 01-14-23 @ 11:40)  Source: .Other RIGHT FOREARM  Preliminary Report (01-18-23 @ 15:03):    Culture is being performed. Fungal cultures are held for 4 weeks.    Culture - Surgical Swab (collected 01-14-23 @ 11:40)  Source: .Surgical Swab RIGHT FOREARM  Final Report (01-16-23 @ 10:29):    Few Escherichia coli    Moderate Streptococcus anginosus    "Susceptibilities not performed"    Few Bacteroides fragilis    "Susceptibilities not performed"  Organism: Escherichia coli (01-16-23 @ 10:29)  Organism: Escherichia coli (01-16-23 @ 10:29)      -  Amikacin: S <=16      -  Amoxicillin/Clavulanic Acid: S <=8/4      -  Ampicillin: S <=8 These ampicillin results predict results for amoxicillin      -  Ampicillin/Sulbactam: S <=4/2 Enterobacter, Klebsiella aerogenes, Citrobacter, and Serratia may develop resistance during prolonged therapy (3-4 days)      -  Aztreonam: S <=4      -  Cefazolin: S <=2 Enterobacter, Klebsiella aerogenes, Citrobacter, and Serratia may develop resistance during prolonged therapy (3-4 days)      -  Cefepime: S <=2      -  Cefoxitin: S <=8      -  Ceftriaxone: S <=1 Enterobacter, Klebsiella aerogenes, Citrobacter, and Serratia may develop resistance during prolonged therapy      -  Ciprofloxacin: S <=0.25      -  Ertapenem: S <=0.5      -  Gentamicin: S <=2      -  Imipenem: S <=1      -  Levofloxacin: S <=0.5      -  Meropenem: S <=1      -  Piperacillin/Tazobactam: S <=8      -  Tobramycin: S <=2      -  Trimethoprim/Sulfamethoxazole: S <=0.5/9.5      Method Type: [facility]    RADIOLOGY:  Images below reviewed personally  CT Angio Upper Extremity w/ IV Cont, Right (01.10.23 @ 03:18)   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  Evaluation of the brachial artery below the elbow/radial/ulnar arteries   is limited. These vessels are grossly patent. The ulnar artery is   dominant and there is calcification.  Consider duplex ultrasound of the fistula and runoff to the right hand.  Evaluation of the vessels in the hand is limited.  There is soft tissue swelling and gas in the region of the third digit   compatible with an infectious process.

## 2023-01-20 NOTE — PROGRESS NOTE ADULT - ASSESSMENT
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 Repeat I+D on 1/14, 1/16, 1/19. Still requires SICU level care for q1h neurovascular checks and sedation due to significant pain during BID dressing changes over extensive forearm/hand wounds with exposed tendons    -Pain control/analgesia  -DVT ppx - Venodynes/HSQ  -FU AM Labs  -Non-weight bearing right upper extremity in bulky dressing  -Daily dressing and packing changes BID  -FU nephrology and HD (MWF)  -FU ID recs (zosyn)  -Appreciate [facility] surgery recs, s/p fistulogram and repeat duplex with good inflow and outflow, FU repeat PPG  -Appreciate ophthalmology recs: outpatient follow-up  -Appreciate SICU level of care, q1HR neurovascular checks.

## 2023-01-20 NOTE — PROGRESS NOTE ADULT - ASSESSMENT
Patient is a 46 y/o male PMH DM2, Bilateral BKA, ESRD (on HD MWF), last dialyzed yesterday transferred from [facility] emergently for evaluation of evaluation of right finger swelling/pain. Patient reports history of right finger pain after he had a fall one year ago. Patient had a wound on her second/middle finger who he was seeing a hand surgeon for outpatient but unable ultimately to continue seeing due to insurance issues. Patient reports his middle finger developed increased swelling and became black in color about two weeks ago. Denies fevers. Patient transferred to [facility] for further evaluation and emergent OR. Patient received broad spectrum Abx of Zosyn/vanco/clindamycin at [place]. Interpretor phone used for translation with patient. ID# [id number]         septic workup and ID evaluation,send blood and urine cx,serial lactate levels,monitor vitals closley,ivfs hydration,monitor urine output and renal profile,iv abx as per id cons    esrd on hd   Excess fluids and waste products will be removed from your blood; your electrolytes will be balanced; your blood pressure will be controlled.        BP monitoring,continue current antihypertensive meds, low salt diet,followup with PMD in 1-2 weeks      ANEMIA PLAN:  Anemia of chronic disease:  Well controlled by epo  H and H subtherapeutic .  We will continue epo aiming for a HCT of 32-36 %.   We will monitor Iron stores, B12 and RBC folate .  epoetin deidre-epbx (RETACRIT) Injectable 57715 Unit(s) IV Push     Accuchecks monitoring and insulin sliding scale coverage, no concentrated sweets diet, serial labs and f/up with PMD, monitor HB A 1 C every 3-4 mnth  piperacillin/tazobactam IVPB.. 3.375 Gram(s) IV Intermittent every 12 hours

## 2023-01-20 NOTE — PROGRESS NOTE ADULT - ASSESSMENT
ORTHO PROGRESS NOTE     Pt seen and examined at bedside, denies SOB, CP, Dizziness. N/V/D /HA.  No significant overnight events. Pain well controlled.    Vital Signs Last 24 Hrs  T(C): 36 (20 Jan 2023 12:00), Max: 37 (19 Jan 2023 20:00)  T(F): 96.8 (20 Jan 2023 12:00), Max: 98.6 (19 Jan 2023 20:00)  HR: 74 (20 Jan 2023 12:00) (54 - 74)  BP: 181/78 (19 Jan 2023 20:00) (181/78 - 181/78)  BP(mean): 103 (19 Jan 2023 20:00) (103 - 103)  RR: 15 (20 Jan 2023 12:00) (9 - 25)  SpO2: 96% (20 Jan 2023 12:00) (96% - 100%)    Parameters below as of 20 Jan 2023 08:00  Patient On (Oxygen Delivery Method): room air        Gen: NAD  Resp: Nonlabored  R UE:    Oozing from index finger  moving fingers  Tenderness distal tips of fingers  Rest of hand WWP  No pain proximally    Labs:  CBC Full  -  ( 20 Jan 2023 02:10 )  WBC Count : 11.07 K/uL  RBC Count : 2.91 M/uL  Hemoglobin : 8.5 g/dL  Hematocrit : 26.7 %  Platelet Count - Automated : 527 K/uL  Mean Cell Volume : 91.8 fL  Mean Cell Hemoglobin : 29.2 pg  Mean Cell Hemoglobin Concentration : 31.8 gm/dL  Auto Neutrophil # : x  Auto Lymphocyte # : x  Auto Monocyte # : x  Auto Eosinophil # : x  Auto Basophil # : x  Auto Neutrophil % : x  Auto Lymphocyte % : x  Auto Monocyte % : x  Auto Eosinophil % : x  Auto Basophil % : x      01-20    132<L>  |  95<L>  |  35<H>  ----------------------------<  290<H>  4.9   |  19<L>  |  7.56<H>    Ca    8.6      20 Jan 2023 02:10  Phos  6.9     01-20  Mg     2.20     01-20    TPro  6.6  /  Alb  3.0<L>  /  TBili  0.3  /  DBili  x   /  AST  14  /  ALT  9   /  AlkPhos  77  01-19      AA/P: 47y y/o Male 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 Repeat I+D on 1/14 1/16 and 1/19        -Pain control/analgesia  -DVT ppx - Venodynes/HSQ  -FU AM Labs  -Non-weight bearing right upper extremity in bulky dressing  -Daily dressing and packing changes BID  -FU nephrology and HD ([facility])  -FU ID recs (zosyn)  -FU Vasc surgery recs, s/p fistulogram and duplex,   -Appreciate optho recs: outpatient follow-up  -Appreciate SICU level of care, q1HR neurovascular checks.

## 2023-01-20 NOTE — PROGRESS NOTE ADULT - SUBJECTIVE AND OBJECTIVE BOX
SICU Daily Progress Note  =====================================================  Interval/Overnight Events: SBP in the 180s, 5 labetalol given and responded well    ALLERGIES:  No Known Drug Allergies  [place] (Rash)    --------------------------------------------------------------------------------------    MEDICATIONS:    Neurologic Medications  acetaminophen     Tablet .. 975 milliGRAM(s) Oral every 8 hours  HYDROmorphone   Tablet 2 milliGRAM(s) Oral every 4 hours PRN Mild Pain (1 - 3)  HYDROmorphone   Tablet 4 milliGRAM(s) Oral every 4 hours PRN Moderate Pain (4 - 6)  HYDROmorphone   Tablet 8 milliGRAM(s) Oral every 4 hours PRN Severe Pain (7 - 10)  methadone    Tablet 10 milliGRAM(s) Oral daily    Respiratory Medications    Cardiovascular Medications  midodrine. 10 milliGRAM(s) Oral three times a day    Gastrointestinal Medications  bisacodyl Suppository 10 milliGRAM(s) Rectal daily PRN If no bowel movement  lactulose Syrup 10 Gram(s) Oral daily PRN Constipation  multivitamin 1 Tablet(s) Oral daily    Genitourinary Medications    Hematologic/Oncologic Medications  aspirin  chewable 81 milliGRAM(s) Oral daily  epoetin deidre-epbx (RETACRIT) Injectable 07116 Unit(s) IV Push <User Schedule>  heparin   Injectable 5000 Unit(s) SubCutaneous every 8 hours    Antimicrobial/Immunologic Medications  piperacillin/tazobactam IVPB.. 3.375 Gram(s) IV Intermittent every 12 hours    Endocrine/Metabolic Medications  insulin glargine Injectable (LANTUS) 10 Unit(s) SubCutaneous at bedtime  insulin lispro (ADMELOG) corrective regimen sliding scale   SubCutaneous three times a day before meals  insulin lispro (ADMELOG) corrective regimen sliding scale   SubCutaneous at bedtime    Topical/Other Medications  chlorhexidine 2% Cloths 1 Application(s) Topical <User Schedule>  dorzolamide 2%/timolol 0.5% Ophthalmic Solution 1 Drop(s) Left EYE two times a day    --------------------------------------------------------------------------------------    VITAL SIGNS:  ICU Vital Signs Last 24 Hrs  T(C): 36.1 (20 Jan 2023 00:00), Max: 37 (19 Jan 2023 20:00)  T(F): 97 (20 Jan 2023 00:00), Max: 98.6 (19 Jan 2023 20:00)  HR: 66 (20 Jan 2023 00:00) (54 - 71)  BP: 181/78 (19 Jan 2023 20:00) (126/50 - 181/78)  BP(mean): 103 (19 Jan 2023 20:00) (103 - 103)  ABP: 164/69 (20 Jan 2023 00:00) (112/50 - 182/77)  ABP(mean): 106 (20 Jan 2023 00:00) (70 - 120)  RR: 12 (20 Jan 2023 00:00) (10 - 25)  SpO2: 100% (20 Jan 2023 00:00) (98% - 100%)    O2 Parameters below as of 20 Jan 2023 00:00  Patient On (Oxygen Delivery Method): room air      --------------------------------------------------------------------------------------    INS AND OUTS:  ICU Vital Signs Last 24 Hrs  T(C): 36.1 (20 Jan 2023 00:00), Max: 37 (19 Jan 2023 20:00)  T(F): 97 (20 Jan 2023 00:00), Max: 98.6 (19 Jan 2023 20:00)  HR: 66 (20 Jan 2023 00:00) (54 - 71)  BP: 181/78 (19 Jan 2023 20:00) (126/50 - 181/78)  BP(mean): 103 (19 Jan 2023 20:00) (103 - 103)  ABP: 164/69 (20 Jan 2023 00:00) (112/50 - 182/77)  ABP(mean): 106 (20 Jan 2023 00:00) (70 - 120)  RR: 12 (20 Jan 2023 00:00) (10 - 25)  SpO2: 100% (20 Jan 2023 00:00) (98% - 100%)    O2 Parameters below as of 20 Jan 2023 00:00  Patient On (Oxygen Delivery Method): room air    --------------------------------------------------------------------------------------    EXAM  NEUROLOGY  RASS: 0 to -1  Exam: Normal, in no acute distress.  Alert and oriented x4.  No focal neurologic deficits.    HEENT  Exam: Normocephalic, atraumatic, EOMI.     RESPIRATORY  Exam: Lungs clear to auscultation, Normal expansion/effort.     CARDIOVASCULAR  Exam: S1, S2.  Regular rate and rhythm.      GI/NUTRITION  Exam: Abdomen soft, Non-tender, Non-distended.   Current Diet: Consistent carb/ renal diet    MUSCULOSKELETAL  Exam: Bilateral BKA, R UE s/p I&D and partial closure with ampuated 3rd finger    SKIN  Exam: Good skin turgor, no skin breakdown. ***    METABOLIC / FLUIDS / ELECTROLYTES  multivitamin 1 Tablet(s) Oral daily      HEMATOLOGIC  [x] VTE Prophylaxis: aspirin  chewable 81 milliGRAM(s) Oral daily  heparin   Injectable 5000 Unit(s) SubCutaneous every 8 hours    Transfusions:	[] PRBC	[] Platelets		[] FFP	[] Cryoprecipitate    INFECTIOUS DISEASE  Antimicrobials/Immunologic Medications:  epoetin deidre-epbx (RETACRIT) Injectable 04663 Unit(s) IV Push <User Schedule>  piperacillin/tazobactam IVPB.. 3.375 Gram(s) IV Intermittent every 12 hours    Day #      of     ***    TUBES / LINES / DRAINS  ***  [x] Peripheral IV  [] Central Venous Line     	[] R	[] L	[] IJ	[] Fem	[] SC	Date Placed:   [] Arterial Line		[] R	[] L	[] Fem	[] Rad	[] Ax	Date Placed:   [] PICC		[] Midline		[] Mediport  [] Urinary Catheter		Date Placed:   [x] Necessity of urinary, arterial, and venous catheters discussed    --------------------------------------------------------------------------------------    LABS                          8.5    9.14  )-----------( 470      ( 19 Jan 2023 14:30 )             25.7   01-19    134<L>  |  97<L>  |  30<H>  ----------------------------<  113<H>  4.0   |  24  |  6.66<H>    Ca    8.6      19 Jan 2023 14:30  Phos  5.5     01-19  Mg     2.10     01-19    TPro  6.6  /  Alb  3.0<L>  /  TBili  0.3  /  DBili  x   /  AST  14  /  ALT  9   /  AlkPhos  77  01-19    --------------------------------------------------------------------------------------    OTHER LABORATORY:     IMAGING STUDIES:   CXR:      SICU Daily Progress Note  =====================================================  Interval/Overnight Events: SBP in the 180s, 5 labetalol given and responded well    ALLERGIES:  No Known Drug Allergies  [place] (Rash)    --------------------------------------------------------------------------------------    MEDICATIONS:    Neurologic Medications  acetaminophen     Tablet .. 975 milliGRAM(s) Oral every 8 hours  HYDROmorphone   Tablet 2 milliGRAM(s) Oral every 4 hours PRN Mild Pain (1 - 3)  HYDROmorphone   Tablet 4 milliGRAM(s) Oral every 4 hours PRN Moderate Pain (4 - 6)  HYDROmorphone   Tablet 8 milliGRAM(s) Oral every 4 hours PRN Severe Pain (7 - 10)  methadone    Tablet 10 milliGRAM(s) Oral daily    Respiratory Medications    Cardiovascular Medications  midodrine. 10 milliGRAM(s) Oral three times a day    Gastrointestinal Medications  bisacodyl Suppository 10 milliGRAM(s) Rectal daily PRN If no bowel movement  lactulose Syrup 10 Gram(s) Oral daily PRN Constipation  multivitamin 1 Tablet(s) Oral daily    Genitourinary Medications    Hematologic/Oncologic Medications  aspirin  chewable 81 milliGRAM(s) Oral daily  epoetin deidre-epbx (RETACRIT) Injectable 66917 Unit(s) IV Push <User Schedule>  heparin   Injectable 5000 Unit(s) SubCutaneous every 8 hours    Antimicrobial/Immunologic Medications  piperacillin/tazobactam IVPB.. 3.375 Gram(s) IV Intermittent every 12 hours    Endocrine/Metabolic Medications  insulin glargine Injectable (LANTUS) 10 Unit(s) SubCutaneous at bedtime  insulin lispro (ADMELOG) corrective regimen sliding scale   SubCutaneous three times a day before meals  insulin lispro (ADMELOG) corrective regimen sliding scale   SubCutaneous at bedtime    Topical/Other Medications  chlorhexidine 2% Cloths 1 Application(s) Topical <User Schedule>  dorzolamide 2%/timolol 0.5% Ophthalmic Solution 1 Drop(s) Left EYE two times a day    --------------------------------------------------------------------------------------    VITAL SIGNS:  ICU Vital Signs Last 24 Hrs  T(C): 36.1 (20 Jan 2023 00:00), Max: 37 (19 Jan 2023 20:00)  T(F): 97 (20 Jan 2023 00:00), Max: 98.6 (19 Jan 2023 20:00)  HR: 66 (20 Jan 2023 00:00) (54 - 71)  BP: 181/78 (19 Jan 2023 20:00) (126/50 - 181/78)  BP(mean): 103 (19 Jan 2023 20:00) (103 - 103)  ABP: 164/69 (20 Jan 2023 00:00) (112/50 - 182/77)  ABP(mean): 106 (20 Jan 2023 00:00) (70 - 120)  RR: 12 (20 Jan 2023 00:00) (10 - 25)  SpO2: 100% (20 Jan 2023 00:00) (98% - 100%)    O2 Parameters below as of 20 Jan 2023 00:00  Patient On (Oxygen Delivery Method): room air      --------------------------------------------------------------------------------------    INS AND OUTS:  ICU Vital Signs Last 24 Hrs  T(C): 36.1 (20 Jan 2023 00:00), Max: 37 (19 Jan 2023 20:00)  T(F): 97 (20 Jan 2023 00:00), Max: 98.6 (19 Jan 2023 20:00)  HR: 66 (20 Jan 2023 00:00) (54 - 71)  BP: 181/78 (19 Jan 2023 20:00) (126/50 - 181/78)  BP(mean): 103 (19 Jan 2023 20:00) (103 - 103)  ABP: 164/69 (20 Jan 2023 00:00) (112/50 - 182/77)  ABP(mean): 106 (20 Jan 2023 00:00) (70 - 120)  RR: 12 (20 Jan 2023 00:00) (10 - 25)  SpO2: 100% (20 Jan 2023 00:00) (98% - 100%)    O2 Parameters below as of 20 Jan 2023 00:00  Patient On (Oxygen Delivery Method): room air    --------------------------------------------------------------------------------------    EXAM  NEUROLOGY  RASS: 0 to -1  Exam: Normal, in no acute distress.  Alert and oriented x4.  No focal neurologic deficits.    HEENT  Exam: Normocephalic, atraumatic, EOMI.     RESPIRATORY  Exam: Lungs clear to auscultation, Normal expansion/effort.     CARDIOVASCULAR  Exam: S1, S2.  Regular rate and rhythm.      GI/NUTRITION  Exam: Abdomen soft, Non-tender, Non-distended.   Current Diet: Consistent carb/ renal diet    MUSCULOSKELETAL  Exam: Bilateral BKA, R UE s/p I&D and partial closure with ampuated 3rd finger    SKIN  Exam: RUE bandaged    METABOLIC / FLUIDS / ELECTROLYTES  multivitamin 1 Tablet(s) Oral daily      HEMATOLOGIC  [x] VTE Prophylaxis: aspirin  chewable 81 milliGRAM(s) Oral daily  heparin   Injectable 5000 Unit(s) SubCutaneous every 8 hours    Transfusions:	[] PRBC	[] Platelets		[] FFP	[] Cryoprecipitate    INFECTIOUS DISEASE  Antimicrobials/Immunologic Medications:  epoetin deidre-epbx (RETACRIT) Injectable 22740 Unit(s) IV Push <User Schedule>  piperacillin/tazobactam IVPB.. 3.375 Gram(s) IV Intermittent every 12 hours    Day #      of     ***    TUBES / LINES / DRAINS  ***  [x] Peripheral IV  [] Central Venous Line     	[] R	[] L	[] IJ	[] Fem	[] SC	Date Placed:   [] Arterial Line		[] R	[] L	[] Fem	[] Rad	[] Ax	Date Placed:   [] PICC		[] Midline		[] Mediport  [] Urinary Catheter		Date Placed:   [x] Necessity of urinary, arterial, and venous catheters discussed    --------------------------------------------------------------------------------------    LABS                          8.5    9.14  )-----------( 470      ( 19 Jan 2023 14:30 )             25.7   01-19    134<L>  |  97<L>  |  30<H>  ----------------------------<  113<H>  4.0   |  24  |  6.66<H>    Ca    8.6      19 Jan 2023 14:30  Phos  5.5     01-19  Mg     2.10     01-19    TPro  6.6  /  Alb  3.0<L>  /  TBili  0.3  /  DBili  x   /  AST  14  /  ALT  9   /  AlkPhos  77  01-19    --------------------------------------------------------------------------------------    OTHER LABORATORY:     IMAGING STUDIES:   CXR:      SICU Daily Progress Note  =====================================================  Interval/Overnight Events:   -SBP in the 180s, 5 labetalol given and responded well  -in AM, dressing around RUE noted to have increased soaking with blood    ALLERGIES:  No Known Drug Allergies  [place] (Rash)    --------------------------------------------------------------------------------------    MEDICATIONS:    Neurologic Medications  acetaminophen     Tablet .. 975 milliGRAM(s) Oral every 8 hours  HYDROmorphone   Tablet 2 milliGRAM(s) Oral every 4 hours PRN Mild Pain (1 - 3)  HYDROmorphone   Tablet 4 milliGRAM(s) Oral every 4 hours PRN Moderate Pain (4 - 6)  HYDROmorphone   Tablet 8 milliGRAM(s) Oral every 4 hours PRN Severe Pain (7 - 10)  methadone    Tablet 10 milliGRAM(s) Oral daily    Respiratory Medications    Cardiovascular Medications  midodrine. 10 milliGRAM(s) Oral three times a day    Gastrointestinal Medications  bisacodyl Suppository 10 milliGRAM(s) Rectal daily PRN If no bowel movement  lactulose Syrup 10 Gram(s) Oral daily PRN Constipation  multivitamin 1 Tablet(s) Oral daily    Genitourinary Medications    Hematologic/Oncologic Medications  aspirin  chewable 81 milliGRAM(s) Oral daily  epoetin deidre-epbx (RETACRIT) Injectable 41876 Unit(s) IV Push <User Schedule>  heparin   Injectable 5000 Unit(s) SubCutaneous every 8 hours    Antimicrobial/Immunologic Medications  piperacillin/tazobactam IVPB.. 3.375 Gram(s) IV Intermittent every 12 hours    Endocrine/Metabolic Medications  insulin glargine Injectable (LANTUS) 10 Unit(s) SubCutaneous at bedtime  insulin lispro (ADMELOG) corrective regimen sliding scale   SubCutaneous three times a day before meals  insulin lispro (ADMELOG) corrective regimen sliding scale   SubCutaneous at bedtime    Topical/Other Medications  chlorhexidine 2% Cloths 1 Application(s) Topical <User Schedule>  dorzolamide 2%/timolol 0.5% Ophthalmic Solution 1 Drop(s) Left EYE two times a day    --------------------------------------------------------------------------------------    VITAL SIGNS:  ICU Vital Signs Last 24 Hrs  T(C): 36.1 (20 Jan 2023 00:00), Max: 37 (19 Jan 2023 20:00)  T(F): 97 (20 Jan 2023 00:00), Max: 98.6 (19 Jan 2023 20:00)  HR: 66 (20 Jan 2023 00:00) (54 - 71)  BP: 181/78 (19 Jan 2023 20:00) (126/50 - 181/78)  BP(mean): 103 (19 Jan 2023 20:00) (103 - 103)  ABP: 164/69 (20 Jan 2023 00:00) (112/50 - 182/77)  ABP(mean): 106 (20 Jan 2023 00:00) (70 - 120)  RR: 12 (20 Jan 2023 00:00) (10 - 25)  SpO2: 100% (20 Jan 2023 00:00) (98% - 100%)    O2 Parameters below as of 20 Jan 2023 00:00  Patient On (Oxygen Delivery Method): room air      --------------------------------------------------------------------------------------    INS AND OUTS:  ICU Vital Signs Last 24 Hrs  T(C): 36.1 (20 Jan 2023 00:00), Max: 37 (19 Jan 2023 20:00)  T(F): 97 (20 Jan 2023 00:00), Max: 98.6 (19 Jan 2023 20:00)  HR: 66 (20 Jan 2023 00:00) (54 - 71)  BP: 181/78 (19 Jan 2023 20:00) (126/50 - 181/78)  BP(mean): 103 (19 Jan 2023 20:00) (103 - 103)  ABP: 164/69 (20 Jan 2023 00:00) (112/50 - 182/77)  ABP(mean): 106 (20 Jan 2023 00:00) (70 - 120)  RR: 12 (20 Jan 2023 00:00) (10 - 25)  SpO2: 100% (20 Jan 2023 00:00) (98% - 100%)    O2 Parameters below as of 20 Jan 2023 00:00  Patient On (Oxygen Delivery Method): room air    --------------------------------------------------------------------------------------    EXAM  NEUROLOGY  RASS: 0 to -1  Exam: Normal, in no acute distress.  Alert and oriented x4.  No focal neurologic deficits.    HEENT  Exam: Normocephalic, atraumatic, EOMI.     RESPIRATORY  Exam: Lungs clear to auscultation, Normal expansion/effort.     CARDIOVASCULAR  Exam: S1, S2.  Regular rate and rhythm.      GI/NUTRITION  Exam: Abdomen soft, Non-tender, Non-distended.   Current Diet: Consistent carb/ renal diet    MUSCULOSKELETAL  Exam: Bilateral BKA, R UE s/p I&D and partial closure with ampuated 3rd finger    SKIN  Exam: RUE bandaged    METABOLIC / FLUIDS / ELECTROLYTES  multivitamin 1 Tablet(s) Oral daily      HEMATOLOGIC  [x] VTE Prophylaxis: aspirin  chewable 81 milliGRAM(s) Oral daily  heparin   Injectable 5000 Unit(s) SubCutaneous every 8 hours    Transfusions:	[] PRBC	[] Platelets		[] FFP	[] Cryoprecipitate    INFECTIOUS DISEASE  Antimicrobials/Immunologic Medications:  epoetin deidre-epbx (RETACRIT) Injectable 62806 Unit(s) IV Push <User Schedule>  piperacillin/tazobactam IVPB.. 3.375 Gram(s) IV Intermittent every 12 hours    Day #      of     ***    TUBES / LINES / DRAINS  ***  [x] Peripheral IV  [] Central Venous Line     	[] R	[] L	[] IJ	[] Fem	[] SC	Date Placed:   [] Arterial Line		[] R	[] L	[] Fem	[] Rad	[] Ax	Date Placed:   [] PICC		[] Midline		[] Mediport  [] Urinary Catheter		Date Placed:   [x] Necessity of urinary, arterial, and venous catheters discussed    --------------------------------------------------------------------------------------    LABS                          8.5    9.14  )-----------( 470      ( 19 Jan 2023 14:30 )             25.7   01-19    134<L>  |  97<L>  |  30<H>  ----------------------------<  113<H>  4.0   |  24  |  6.66<H>    Ca    8.6      19 Jan 2023 14:30  Phos  5.5     01-19  Mg     2.10     01-19    TPro  6.6  /  Alb  3.0<L>  /  TBili  0.3  /  DBili  x   /  AST  14  /  ALT  9   /  AlkPhos  77  01-19    --------------------------------------------------------------------------------------    OTHER LABORATORY:     IMAGING STUDIES:   CXR:

## 2023-01-20 NOTE — PROGRESS NOTE ADULT - NUTRITIONAL ASSESSMENT
MEDICATIONS  (STANDING):  acetaminophen     Tablet .. 975 milliGRAM(s) Oral every 8 hours  aspirin  chewable 81 milliGRAM(s) Oral daily  chlorhexidine 2% Cloths 1 Application(s) Topical <User Schedule>  dorzolamide 2%/timolol 0.5% Ophthalmic Solution 1 Drop(s) Left EYE two times a day  epoetin deidre-epbx (RETACRIT) Injectable 80266 Unit(s) IV Push <User Schedule>  heparin   Injectable 5000 Unit(s) SubCutaneous every 8 hours  insulin glargine Injectable (LANTUS) 10 Unit(s) SubCutaneous at bedtime  insulin lispro (ADMELOG) corrective regimen sliding scale   SubCutaneous three times a day before meals  insulin lispro (ADMELOG) corrective regimen sliding scale   SubCutaneous at bedtime  insulin lispro Injectable (ADMELOG) 3 Unit(s) SubCutaneous Before meals and at bedtime  methadone    Tablet 10 milliGRAM(s) Oral daily  midodrine. 10 milliGRAM(s) Oral three times a day  multivitamin 1 Tablet(s) Oral daily  piperacillin/tazobactam IVPB.. 3.375 Gram(s) IV Intermittent every 12 hours

## 2023-01-20 NOTE — PROGRESS NOTE ADULT - ASSESSMENT
47 year old male with a PMHx of DM2, bilateral BKAs and ESRD (on HD - M / W / F) who was transferred from [facility] emergently for evaluation of right finger swelling / pain.  Patient was found to have steal syndrome.  Patient is now S/P revision of arteriovenous dialysis fistula by vascular surgery on 1/13/23.  Repeat right hand / forearm I&D by ortho on 1/14/23, 1/16/23. Under SICU care for hemorrhagic show, now stabilized and pain management for dressing changes requiring conscious sedation.      47 year old male with a PMHx of DM2, bilateral BKAs and ESRD (on HD - M / W / F) who was transferred from [facility] emergently for evaluation of right finger swelling / pain.  Patient was found to have steal syndrome.  Patient is now S/P revision of arteriovenous dialysis fistula by vascular surgery on 1/13/23.  Repeat right hand / forearm I&D by ortho on 1/14/23, 1/16/23. Under SICU care for hemorrhagic show, now stabilized and pain management for dressing changes requiring conscious sedation.     Interval/Overnight events:   - SBP in the 180s, 5 labetalol given and responded well    PLAN:  NEUROLOGY:  - Pain control: transition to dilaudid PO 2/4/8 for mild/mod/severe pain q4 PRN  - for future dressing changes, consider dilaudid PRN to minimize ketamine requirements  - continue home Methadone 10mg daily      RESPIRATORY:  - No active issues  - Saturating well on room air  - Continuous pulse oximetry  - Maintain O2 saturation >92%    CARDIOVASCULAR:  - now off pressors   - continue with home dose Midodrine 10mg TID  - set MAP goal >60, monitor closely for adequate perfusion  - RUE VA duplex performed; patent brachiocephalic AVF      GI / NUTRITION:  - Diet: Consistent carb/renal diet; NPO at midnight for OR today  - Bowel regimen with lactulose and Dulcolax      RENAL / GENITOURINARY:  - HD last 1/18  - Monitor electrolytes and replete PRN  - Continue to monitor strict ins and outs q1 hour    HEMATOLOGIC:   - s/p 1U pRBC 1/16 prior to OR   - s/p 1/17 3U pRBC, ddAVP, 2U plasma, 1U plts, 1/18 1U pRBC   - order additional fluids/blood as needed  - continue with Aspirin  - VTE prophylaxis with HSQ  - continue to monitor H&H on AM labs     INFECTIOUS DISEASE:  - Currently afebrile with leukocytosis, WBC downtrending  - Continue with IV Zosyn    ENDOCRINOLOGY:  - Monitor fingersticks  - S/p insulin gtt, transitioned to Lantus 10U qhs  - Continue with insulin sliding scale    Disposition: SICU         47 year old male with a PMHx of DM2, bilateral BKAs and ESRD (on HD - M / W / F) who was transferred from [facility] emergently for evaluation of right finger swelling / pain.  Patient was found to have steal syndrome.  Patient is now S/P revision of arteriovenous dialysis fistula by vascular surgery on 1/13/23.  Repeat right hand / forearm I&D by ortho on 1/14/23, 1/16/23. Under SICU care for hemorrhagic show, now stabilized and pain management for dressing changes requiring conscious sedation.     Interval/Overnight events:   - SBP in the 180s, 5 labetalol given and responded well    PLAN:  NEUROLOGY:  - Pain control: Tylenol, dilaudid PO 2/4/8 for mild/mod/severe pain q4 PRN  - for dressing changes, dilaudid PRN  - continue home Methadone 10mg daily      RESPIRATORY:  - No active issues  - Saturating well on room air  - Continuous pulse oximetry  - Maintain O2 saturation >92%    CARDIOVASCULAR:  - continue with home dose Midodrine 10mg TID  - set MAP goal >60, monitor closely for adequate perfusion  - RUE VA duplex performed; patent brachiocephalic AVF  - continue to monitor off pressors       GI / NUTRITION:  - Diet: Consistent carb/renal diet  - Bowel regimen with lactulose and Dulcolax      RENAL / GENITOURINARY:  - HD today 1/20  - Monitor electrolytes and replete PRN  - Continue to monitor strict ins and outs q1 hour    HEMATOLOGIC:   - s/p 1U pRBC 1/16 prior to OR   - s/p 1/17 3U pRBC, ddAVP, 2U plasma, 1U plts, 1/18 1U pRBC   - H&H stable today   - order additional fluids/blood as needed  - continue with Aspirin  - VTE prophylaxis with HSQ      INFECTIOUS DISEASE:  - Currently afebrile with leukocytosis  - Continue with IV Zosyn    ENDOCRINOLOGY:  - Monitor fingersticks  - Lantus 10U qhs  - With elevated POC glucose, will re-evaluate insulin requirements  - Continue with insulin sliding scale    Disposition: Eligible for transfer to floors          47 year old male with a PMHx of DM2, bilateral BKAs and ESRD (on HD - M / W / F) who was transferred from [facility] emergently for evaluation of right finger swelling / pain.  Patient was found to have steal syndrome.  Patient is now S/P revision of arteriovenous dialysis fistula by vascular surgery on 1/13/23.  Repeat right hand / forearm I&D by ortho on 1/14/23, 1/16/23. Under SICU care for hemorrhagic show, now stabilized and pain management for dressing changes requiring conscious sedation.     Interval/Overnight events:   - SBP in the 180s, 5 labetalol given and responded well      PLAN:  NEUROLOGY:  - Pain control: Tylenol, dilaudid PO 2/4/8 for mild/mod/severe pain q4 PRN  - for 1/20 dressing change, pt refusing/unable to tolerate with PO dilaudid, re-consider adding additional IV medications PRN for future dressing changes    RESPIRATORY:  - No active issues  - Saturating well on room air  - Continuous pulse oximetry  - Maintain O2 saturation >92%    CARDIOVASCULAR:  - continue with home dose Midodrine 10mg TID  - set MAP goal >60, monitor closely for adequate perfusion  - RUE VA duplex performed; patent brachiocephalic AVF  - continue to monitor off pressors       GI / NUTRITION:  - Diet: Consistent carb/renal diet  - Bowel regimen with lactulose and Dulcolax      RENAL / GENITOURINARY:  - HD today 1/20  - Monitor electrolytes and replete PRN  - Continue to monitor strict ins and outs q1 hour    HEMATOLOGIC:   - s/p 1U pRBC 1/16 prior to OR   - s/p 1/17 3U pRBC, ddAVP, 2U plasma, 1U plts, 1/18 1U pRBC   - H&H stable today   - order additional fluids/blood as needed  - continue with Aspirin  - VTE prophylaxis with HSQ      INFECTIOUS DISEASE:  - Currently afebrile with leukocytosis  - Continue with IV Zosyn    ENDOCRINOLOGY:  - Monitor fingersticks  - Lantus 10U qhs  - With elevated POC glucose, will re-evaluate insulin requirements  - Continue with insulin sliding scale    Disposition: SICU          47 year old male with a PMHx of DM2, bilateral BKAs and ESRD (on HD - M / W / F) who was transferred from [facility] emergently for evaluation of right finger swelling / pain.  Patient was found to have steal syndrome.  Patient is now S/P revision of arteriovenous dialysis fistula by vascular surgery on 1/13/23.  Repeat right hand / forearm I&D by ortho on 1/14/23, 1/16/23. Under SICU care for hemorrhagic show, now stabilized and pain management for dressing changes requiring conscious sedation.     Interval/Overnight events:   - SBP in the 180s, 5 labetalol given and responded well      PLAN:  NEUROLOGY:  - Pain control: Tylenol, dilaudid PO 2/4/8 for mild/mod/severe pain q4 PRN  - for 1/20 dressing change, pt refusing/unable to tolerate with PO dilaudid, re-consider adding additional IV medications PRN for future dressing changes    RESPIRATORY:  - No active issues  - Saturating well on room air  - Continuous pulse oximetry  - Maintain O2 saturation >92%    CARDIOVASCULAR:  - continue with home dose Midodrine 10mg TID  - set MAP goal >60, monitor closely for adequate perfusion  - RUE VA duplex performed; patent brachiocephalic AVF  - continue to monitor off pressors       GI / NUTRITION:  - Diet: Consistent carb/renal diet  - Bowel regimen with lactulose and Dulcolax      RENAL / GENITOURINARY:  - HD today 1/20  - Monitor electrolytes and replete PRN  - Continue to monitor strict ins and outs q1 hour    HEMATOLOGIC:   - s/p 1U pRBC 1/16 prior to OR   - s/p 1/17 3U pRBC, ddAVP, 2U plasma, 1U plts, 1/18 1U pRBC   - H&H stable today   - order additional fluids/blood as needed  - continue with Aspirin  - VTE prophylaxis with HSQ      INFECTIOUS DISEASE:  - Currently afebrile with leukocytosis  - Continue with IV Zosyn    ENDOCRINOLOGY:  - Monitor fingersticks  - Lantus 10U qhs  - With elevated POC glucose, will re-evaluate insulin requirements  - Continue with insulin sliding scale    LINES/TUBES/DRAINS:  - L radial a-line  - L femoral CVC    Disposition: SICU          47 year old male with a PMHx of DM2, bilateral BKAs and ESRD (on HD - M / W / F) who was transferred from [facility] emergently for evaluation of right finger swelling / pain.  Patient was found to have steal syndrome.  Patient is now S/P revision of arteriovenous dialysis fistula by vascular surgery on 1/13/23.  Repeat right hand / forearm I&D by ortho on 1/14/23, 1/16/23. Under SICU care for hemorrhagic show, now stabilized and pain management for dressing changes requiring conscious sedation.     Interval/Overnight events:   - SBP in the 180s, 5 labetalol given and responded well      PLAN:  NEUROLOGY:  - Pain control: Tylenol, dilaudid PO 2/4/8 for mild/mod/severe pain q4 PRN  - for 1/20 AM dressing change, pt refusing/unable to tolerate with PO dilaudid, re-consider adding additional IV medications PRN for future dressing changes  - per orthopedics, requires dressing changes BID tentatively until next week     RESPIRATORY:  - No active issues  - Saturating well on room air  - Continuous pulse oximetry  - Maintain O2 saturation >92%    CARDIOVASCULAR:  - continue with home dose Midodrine 10mg TID  - set MAP goal >60, monitor closely for adequate perfusion  - RUE VA duplex performed; patent brachiocephalic AVF  - continue to monitor off pressors       GI / NUTRITION:  - Diet: Consistent carb/renal diet  - Bowel regimen with lactulose and Dulcolax      RENAL / GENITOURINARY:  - HD today 1/20  - Monitor electrolytes and replete PRN  - Continue to monitor strict ins and outs q1 hour    HEMATOLOGIC:   - s/p 1U pRBC 1/16 prior to OR   - s/p 1/17 3U pRBC, ddAVP, 2U plasma, 1U plts, 1/18 1U pRBC   - H&H stable today   - order additional fluids/blood as needed  - continue with Aspirin  - VTE prophylaxis with HSQ      INFECTIOUS DISEASE:  - Currently afebrile with leukocytosis  - Continue with IV Zosyn    ENDOCRINOLOGY:  - Monitor fingersticks  - Lantus 10U qhs  - With elevated POC glucose, will re-evaluate insulin requirements  - Continue with insulin sliding scale    LINES/TUBES/DRAINS:  - L radial a-line  - L femoral CVC    Disposition: SICU

## 2023-01-20 NOTE — PROGRESS NOTE ADULT - SUBJECTIVE AND OBJECTIVE BOX
Patient is a 47y Male whom presented to the hospital with esrd on hd     PAST MEDICAL & SURGICAL HISTORY:      MEDICATIONS  (STANDING):  dextrose 5%. 1000 milliLiter(s) (100 mL/Hr) IV Continuous <Continuous>  dextrose 5%. 1000 milliLiter(s) (50 mL/Hr) IV Continuous <Continuous>  dextrose 50% Injectable 25 Gram(s) IV Push once  dextrose 50% Injectable 25 Gram(s) IV Push once  dextrose 50% Injectable 12.5 Gram(s) IV Push once  glucagon  Injectable 1 milliGRAM(s) IntraMuscular once  insulin lispro (ADMELOG) corrective regimen sliding scale   SubCutaneous every 6 hours  pantoprazole    Tablet 40 milliGRAM(s) Oral daily  polyethylene glycol 3350 17 Gram(s) Oral daily  senna 2 Tablet(s) Oral at bedtime      Allergies    No Known Allergies    Intolerances        SOCIAL HISTORY:  Denies ETOh,Smoking,     FAMILY HISTORY:      REVIEW OF SYSTEMS:    CONSTITUTIONAL: No weakness, fevers or chills  NECK: No pain or stiffness  RESPIRATORY: No cough, wheezing, hemoptysis; No shortness of breath  CARDIOVASCULAR: No chest pain or palpitations  GASTROINTESTINAL: No abdominal or epigastric pain. No nausea, vomiting,                                                         7.6    11.58 )-----------( 409      ( 18 Jan 2023 00:40 )             23.3       CBC Full  -  ( 18 Jan 2023 00:40 )  WBC Count : 11.58 K/uL  RBC Count : 2.64 M/uL  Hemoglobin : 7.6 g/dL  Hematocrit : 23.3 %  Platelet Count - Automated : 409 K/uL  Mean Cell Volume : 88.3 fL  Mean Cell Hemoglobin : 28.8 pg  Mean Cell Hemoglobin Concentration : 32.6 gm/dL  Auto Neutrophil # : x  Auto Lymphocyte # : x  Auto Monocyte # : x  Auto Eosinophil # : x  Auto Basophil # : x  Auto Neutrophil % : x  Auto Lymphocyte % : x  Auto Monocyte % : x  Auto Eosinophil % : x  Auto Basophil % : x      01-18    133<L>  |  95<L>  |  37<H>  ----------------------------<  147<H>  4.2   |  26  |  7.25<H>    Ca    8.8      18 Jan 2023 00:40  Phos  5.2     01-18  Mg     2.30     01-18        CAPILLARY BLOOD GLUCOSE      POCT Blood Glucose.: 192 mg/dL (18 Jan 2023 16:26)  POCT Blood Glucose.: 80 mg/dL (18 Jan 2023 11:35)  POCT Blood Glucose.: 136 mg/dL (18 Jan 2023 07:43)  POCT Blood Glucose.: 137 mg/dL (18 Jan 2023 06:13)  POCT Blood Glucose.: 137 mg/dL (18 Jan 2023 05:04)  POCT Blood Glucose.: 155 mg/dL (18 Jan 2023 04:14)  POCT Blood Glucose.: 154 mg/dL (18 Jan 2023 03:13)  POCT Blood Glucose.: 162 mg/dL (18 Jan 2023 02:09)  POCT Blood Glucose.: 154 mg/dL (18 Jan 2023 01:13)  POCT Blood Glucose.: 150 mg/dL (18 Jan 2023 00:21)  POCT Blood Glucose.: 157 mg/dL (17 Jan 2023 23:14)  POCT Blood Glucose.: 148 mg/dL (17 Jan 2023 22:07)  POCT Blood Glucose.: 159 mg/dL (17 Jan 2023 21:25)  POCT Blood Glucose.: 156 mg/dL (17 Jan 2023 20:23)  POCT Blood Glucose.: 99 mg/dL (17 Jan 2023 19:17)      Vital Signs Last 24 Hrs  T(C): 36.6 (18 Jan 2023 16:00), Max: 36.7 (18 Jan 2023 10:18)  T(F): 97.8 (18 Jan 2023 16:00), Max: 98 (18 Jan 2023 10:18)  HR: 64 (18 Jan 2023 18:00) (64 - 80)  BP: --  BP(mean): --  RR: 19 (18 Jan 2023 18:00) (12 - 23)  SpO2: 100% (18 Jan 2023 18:00) (100% - 100%)    Parameters below as of 18 Jan 2023 16:00  Patient On (Oxygen Delivery Method): room air            PT/INR - ( 18 Jan 2023 00:40 )   PT: 12.2 sec;   INR: 1.05 ratio         PTT - ( 18 Jan 2023 00:40 )  PTT:30.3 sec                                 PHYSICAL EXAM:    Constitutional: NAD  HEENT: conjunctive   clear   Neck:  No JVD  Respiratory: CTAB  Cardiovascular: S1 and S2  Gastrointestinal: BS+, soft, NT/ND   right hand dressed. bilateral BKA   right arm AVF nontender
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
## 2023-01-21 LAB
ANION GAP SERPL CALC-SCNC: 13 MMOL/L — SIGNIFICANT CHANGE UP (ref 7–14)
BUN SERPL-MCNC: 20 MG/DL — SIGNIFICANT CHANGE UP (ref 7–23)
CA-I BLD-SCNC: 1.1 MMOL/L — LOW (ref 1.15–1.29)
CALCIUM SERPL-MCNC: 8.3 MG/DL — LOW (ref 8.4–10.5)
CHLORIDE SERPL-SCNC: 97 MMOL/L — LOW (ref 98–107)
CO2 SERPL-SCNC: 26 MMOL/L — SIGNIFICANT CHANGE UP (ref 22–31)
CREAT SERPL-MCNC: 5.01 MG/DL — HIGH (ref 0.5–1.3)
EGFR: 13 ML/MIN/1.73M2 — LOW
GLUCOSE BLDC GLUCOMTR-MCNC: 114 MG/DL — HIGH (ref 70–99)
GLUCOSE BLDC GLUCOMTR-MCNC: 188 MG/DL — HIGH (ref 70–99)
GLUCOSE BLDC GLUCOMTR-MCNC: 189 MG/DL — HIGH (ref 70–99)
GLUCOSE SERPL-MCNC: 201 MG/DL — HIGH (ref 70–99)
HCT VFR BLD CALC: 21.9 % — LOW (ref 39–50)
HCT VFR BLD CALC: 23.2 % — LOW (ref 39–50)
HGB BLD-MCNC: 7.1 G/DL — LOW (ref 13–17)
HGB BLD-MCNC: 7.4 G/DL — LOW (ref 13–17)
MAGNESIUM SERPL-MCNC: 2 MG/DL — SIGNIFICANT CHANGE UP (ref 1.6–2.6)
MCHC RBC-ENTMCNC: 29.1 PG — SIGNIFICANT CHANGE UP (ref 27–34)
MCHC RBC-ENTMCNC: 29.3 PG — SIGNIFICANT CHANGE UP (ref 27–34)
MCHC RBC-ENTMCNC: 31.9 GM/DL — LOW (ref 32–36)
MCHC RBC-ENTMCNC: 32.4 GM/DL — SIGNIFICANT CHANGE UP (ref 32–36)
MCV RBC AUTO: 90.5 FL — SIGNIFICANT CHANGE UP (ref 80–100)
MCV RBC AUTO: 91.3 FL — SIGNIFICANT CHANGE UP (ref 80–100)
NRBC # BLD: 0 /100 WBCS — SIGNIFICANT CHANGE UP (ref 0–0)
NRBC # BLD: 0 /100 WBCS — SIGNIFICANT CHANGE UP (ref 0–0)
NRBC # FLD: 0 K/UL — SIGNIFICANT CHANGE UP (ref 0–0)
NRBC # FLD: 0 K/UL — SIGNIFICANT CHANGE UP (ref 0–0)
PHOSPHATE SERPL-MCNC: 4.2 MG/DL — SIGNIFICANT CHANGE UP (ref 2.5–4.5)
PLATELET # BLD AUTO: 530 K/UL — HIGH (ref 150–400)
PLATELET # BLD AUTO: 588 K/UL — HIGH (ref 150–400)
POTASSIUM SERPL-MCNC: 3.6 MMOL/L — SIGNIFICANT CHANGE UP (ref 3.5–5.3)
POTASSIUM SERPL-SCNC: 3.6 MMOL/L — SIGNIFICANT CHANGE UP (ref 3.5–5.3)
RBC # BLD: 2.42 M/UL — LOW (ref 4.2–5.8)
RBC # BLD: 2.54 M/UL — LOW (ref 4.2–5.8)
RBC # FLD: 17.5 % — HIGH (ref 10.3–14.5)
RBC # FLD: 18.2 % — HIGH (ref 10.3–14.5)
SODIUM SERPL-SCNC: 136 MMOL/L — SIGNIFICANT CHANGE UP (ref 135–145)
WBC # BLD: 13.01 K/UL — HIGH (ref 3.8–10.5)
WBC # BLD: 9.68 K/UL — SIGNIFICANT CHANGE UP (ref 3.8–10.5)
WBC # FLD AUTO: 13.01 K/UL — HIGH (ref 3.8–10.5)
WBC # FLD AUTO: 9.68 K/UL — SIGNIFICANT CHANGE UP (ref 3.8–10.5)

## 2023-01-21 PROCEDURE — 99233 SBSQ HOSP IP/OBS HIGH 50: CPT

## 2023-01-21 RX ORDER — CALCIUM GLUCONATE 100 MG/ML
1 VIAL (ML) INTRAVENOUS ONCE
Refills: 0 | Status: COMPLETED | OUTPATIENT
Start: 2023-01-21 | End: 2023-01-21

## 2023-01-21 RX ADMIN — Medication 975 MILLIGRAM(S): at 13:45

## 2023-01-21 RX ADMIN — DORZOLAMIDE HYDROCHLORIDE TIMOLOL MALEATE 1 DROP(S): 20; 5 SOLUTION/ DROPS OPHTHALMIC at 19:05

## 2023-01-21 RX ADMIN — Medication 975 MILLIGRAM(S): at 06:12

## 2023-01-21 RX ADMIN — DORZOLAMIDE HYDROCHLORIDE TIMOLOL MALEATE 1 DROP(S): 20; 5 SOLUTION/ DROPS OPHTHALMIC at 06:13

## 2023-01-21 RX ADMIN — Medication 975 MILLIGRAM(S): at 21:24

## 2023-01-21 RX ADMIN — Medication 1 TABLET(S): at 11:32

## 2023-01-21 RX ADMIN — HEPARIN SODIUM 5000 UNIT(S): 5000 INJECTION INTRAVENOUS; SUBCUTANEOUS at 21:24

## 2023-01-21 RX ADMIN — CHLORHEXIDINE GLUCONATE 1 APPLICATION(S): 213 SOLUTION TOPICAL at 06:14

## 2023-01-21 RX ADMIN — PIPERACILLIN AND TAZOBACTAM 25 GRAM(S): 4; .5 INJECTION, POWDER, LYOPHILIZED, FOR SOLUTION INTRAVENOUS at 19:06

## 2023-01-21 RX ADMIN — Medication 200 GRAM(S): at 08:09

## 2023-01-21 RX ADMIN — Medication 975 MILLIGRAM(S): at 06:26

## 2023-01-21 RX ADMIN — MIDODRINE HYDROCHLORIDE 10 MILLIGRAM(S): 2.5 TABLET ORAL at 11:31

## 2023-01-21 RX ADMIN — HEPARIN SODIUM 5000 UNIT(S): 5000 INJECTION INTRAVENOUS; SUBCUTANEOUS at 13:46

## 2023-01-21 RX ADMIN — Medication 4 UNIT(S): at 16:37

## 2023-01-21 RX ADMIN — MIDODRINE HYDROCHLORIDE 10 MILLIGRAM(S): 2.5 TABLET ORAL at 19:05

## 2023-01-21 RX ADMIN — INSULIN GLARGINE 14 UNIT(S): 100 INJECTION, SOLUTION SUBCUTANEOUS at 22:09

## 2023-01-21 RX ADMIN — Medication 2: at 08:09

## 2023-01-21 RX ADMIN — PIPERACILLIN AND TAZOBACTAM 25 GRAM(S): 4; .5 INJECTION, POWDER, LYOPHILIZED, FOR SOLUTION INTRAVENOUS at 06:13

## 2023-01-21 RX ADMIN — Medication 4 UNIT(S): at 11:32

## 2023-01-21 RX ADMIN — Medication 81 MILLIGRAM(S): at 11:31

## 2023-01-21 RX ADMIN — MIDODRINE HYDROCHLORIDE 10 MILLIGRAM(S): 2.5 TABLET ORAL at 06:13

## 2023-01-21 RX ADMIN — HEPARIN SODIUM 5000 UNIT(S): 5000 INJECTION INTRAVENOUS; SUBCUTANEOUS at 06:13

## 2023-01-21 NOTE — PROGRESS NOTE ADULT - SUBJECTIVE AND OBJECTIVE BOX
SICU Daily Progress Note  =====================================================  Interval/Overnight Events:   - Dressing changes by Ortho  - Hyperglycemia  - HD -338 1/20/23  - Lantus 14U qhs    ALLERGIES:  No Known Drug Allergies  [place] (Rash)    --------------------------------------------------------------------------------------    MEDICATIONS:    Neurologic Medications  acetaminophen     Tablet .. 975 milliGRAM(s) Oral every 8 hours  HYDROmorphone   Tablet 2 milliGRAM(s) Oral every 4 hours PRN Mild Pain (1 - 3)  HYDROmorphone   Tablet 4 milliGRAM(s) Oral every 4 hours PRN Moderate Pain (4 - 6)  HYDROmorphone   Tablet 8 milliGRAM(s) Oral every 4 hours PRN Severe Pain (7 - 10)  methadone    Tablet 10 milliGRAM(s) Oral daily    Respiratory Medications    Cardiovascular Medications  midodrine. 10 milliGRAM(s) Oral three times a day    Gastrointestinal Medications  bisacodyl Suppository 10 milliGRAM(s) Rectal daily PRN If no bowel movement  lactulose Syrup 10 Gram(s) Oral daily PRN Constipation  multivitamin 1 Tablet(s) Oral daily    Genitourinary Medications    Hematologic/Oncologic Medications  aspirin  chewable 81 milliGRAM(s) Oral daily  epoetin deidre-epbx (RETACRIT) Injectable 39966 Unit(s) IV Push <User Schedule>  heparin   Injectable 5000 Unit(s) SubCutaneous every 8 hours    Antimicrobial/Immunologic Medications  piperacillin/tazobactam IVPB.. 3.375 Gram(s) IV Intermittent every 12 hours    Endocrine/Metabolic Medications  insulin glargine Injectable (LANTUS) 10 Unit(s) SubCutaneous at bedtime  insulin lispro (ADMELOG) corrective regimen sliding scale   SubCutaneous three times a day before meals  insulin lispro (ADMELOG) corrective regimen sliding scale   SubCutaneous at bedtime    Topical/Other Medications  chlorhexidine 2% Cloths 1 Application(s) Topical <User Schedule>  dorzolamide 2%/timolol 0.5% Ophthalmic Solution 1 Drop(s) Left EYE two times a day    --------------------------------------------------------------------------------------     Vital Signs Last 24 Hrs  T(C): 36.1 (21 Jan 2023 00:00), Max: 36.3 (20 Jan 2023 17:05)  T(F): 96.9 (21 Jan 2023 00:00), Max: 97.3 (20 Jan 2023 17:05)  HR: 69 (21 Jan 2023 00:00) (61 - 74)  BP: 127/65 (20 Jan 2023 16:00) (94/53 - 127/65)  BP(mean): 83 (20 Jan 2023 16:00) (67 - 83)  RR: 12 (21 Jan 2023 00:00) (9 - 22)  SpO2: 100% (21 Jan 2023 00:00) (92% - 100%)    Parameters below as of 21 Jan 2023 00:00  Patient On (Oxygen Delivery Method): room air      I&O's Detail    19 Jan 2023 07:01  -  20 Jan 2023 07:00  --------------------------------------------------------  IN:    IV PiggyBack: 200 mL    Oral Fluid: 500 mL  Total IN: 700 mL    OUT:  Total OUT: 0 mL    Total NET: 700 mL      20 Jan 2023 07:01  -  21 Jan 2023 00:55  --------------------------------------------------------  IN:    IV PiggyBack: 100 mL    Oral Fluid: 860 mL    Other (mL): 400 mL  Total IN: 1360 mL    OUT:    Other (mL): 738 mL  Total OUT: 738 mL    Total NET: 622 mL          --------------------------------------------------------------------------------------    EXAM  NEUROLOGY  RASS: 0 to -1  Exam: Normal, in no acute distress.  Alert and oriented x4.  No focal neurologic deficits.    HEENT  Exam: Normocephalic, atraumatic, EOMI.     RESPIRATORY  Exam: Lungs clear to auscultation, Normal expansion/effort.     CARDIOVASCULAR  Exam: S1, S2.  Regular rate and rhythm.      GI/NUTRITION  Exam: Abdomen soft, Non-tender, Non-distended.   Current Diet: Consistent carb/ renal diet    MUSCULOSKELETAL  Exam: Bilateral BKA, R UE s/p I&D and partial closure with ampuated 3rd finger    SKIN  Exam: RUE bandaged    METABOLIC / FLUIDS / ELECTROLYTES  multivitamin 1 Tablet(s) Oral daily      HEMATOLOGIC  [x] VTE Prophylaxis: aspirin  chewable 81 milliGRAM(s) Oral daily  heparin   Injectable 5000 Unit(s) SubCutaneous every 8 hours    Transfusions:	[] PRBC	[] Platelets		[] FFP	[] Cryoprecipitate    INFECTIOUS DISEASE  Antimicrobials/Immunologic Medications:  epoetin deidre-epbx (RETACRIT) Injectable 50283 Unit(s) IV Push <User Schedule>  piperacillin/tazobactam IVPB.. 3.375 Gram(s) IV Intermittent every 12 hours    Day #      of     ***    TUBES / LINES / DRAINS  ***  [x] Peripheral IV  [] Central Venous Line     	[] R	[] L	[] IJ	[] Fem	[] SC	Date Placed:   [] Arterial Line		[] R	[] L	[] Fem	[] Rad	[] Ax	Date Placed:   [] PICC		[] Midline		[] Mediport  [] Urinary Catheter		Date Placed:   [x] Necessity of urinary, arterial, and venous catheters discussed    --------------------------------------------------------------------------------------       LABS:  cret                        8.5    11.07 )-----------( 527      ( 20 Jan 2023 02:10 )             26.7     01-20    132<L>  |  95<L>  |  35<H>  ----------------------------<  290<H>  4.9   |  19<L>  |  7.56<H>    Ca    8.6      20 Jan 2023 02:10  Phos  6.9     01-20  Mg     2.20     01-20    TPro  6.6  /  Alb  3.0<L>  /  TBili  0.3  /  DBili  x   /  AST  14  /  ALT  9   /  AlkPhos  77  01-19    PT/INR - ( 19 Jan 2023 01:40 )   PT: 12.4 sec;   INR: 1.07 ratio         PTT - ( 19 Jan 2023 01:40 )  PTT:31.7 sec          CXR:

## 2023-01-21 NOTE — PROGRESS NOTE ADULT - ASSESSMENT
47 year old male with a PMHx of DM2, bilateral BKAs and ESRD (on HD - M / W / F) who was transferred from [facility] emergently for evaluation of right finger swelling / pain.  Patient was found to have steal syndrome.  Patient is now S/P revision of arteriovenous dialysis fistula by vascular surgery on 1/13/23.  Repeat right hand / forearm I&D by ortho on 1/14/23, 1/16/23. Under SICU care for hemorrhagic show, now stabilized and pain management for dressing changes requiring conscious sedation.     Interval/Overnight Events:   - Dressing changes by Ortho  - Hyperglycemia  - HD -338 1/20/23      PLAN:  NEUROLOGY:  - Pain control: Tylenol, dilaudid PO 2/4/8 for mild/mod/severe pain q4 PRN  - for 1/20 AM dressing change, pt refusing/unable to tolerate with PO dilaudid, re-consider adding additional IV medications PRN for future dressing changes  - per orthopedics, requires dressing changes BID tentatively until next week     RESPIRATORY:  - No active issues  - Saturating well on room air  - Continuous pulse oximetry  - Maintain O2 saturation >92%    CARDIOVASCULAR:  - continue with home dose Midodrine 10mg TID  - set MAP goal >60, monitor closely for adequate perfusion  - RUE VA duplex performed; patent brachiocephalic AVF  - continue to monitor off pressors       GI / NUTRITION:  - Diet: Consistent carb/renal diet  - Bowel regimen with lactulose and Dulcolax      RENAL / GENITOURINARY:  - HD today 1/20  - Monitor electrolytes and replete PRN  - Continue to monitor strict ins and outs q1 hour    HEMATOLOGIC:   - s/p 1U pRBC 1/16 prior to OR   - s/p 1/17 3U pRBC, ddAVP, 2U plasma, 1U plts, 1/18 1U pRBC   - H&H stable today   - order additional fluids/blood as needed  - continue with Aspirin  - VTE prophylaxis with HSQ      INFECTIOUS DISEASE:  - Currently afebrile with leukocytosis  - Continue with IV Zosyn    ENDOCRINOLOGY:  - Monitor fingersticks  - Lantus 14U qhs  - With elevated POC glucose, will re-evaluate insulin requirements  - Continue with insulin sliding scale    LINES/TUBES/DRAINS:  - L radial a-line  - L femoral CVC    Disposition: SICU          47 year old male with a PMHx of DM2, bilateral BKAs and ESRD (on HD - M / W / F) who was transferred from [facility] emergently for evaluation of right finger swelling / pain.  Patient was found to have steal syndrome.  Patient is now S/P revision of arteriovenous dialysis fistula by vascular surgery on 1/13/23.  Repeat right hand / forearm I&D by ortho on 1/14/23, 1/16/23. Under SICU care for pain management for dressing changes, now tolerating with minimal medication.     Interval/Overnight Events:   - Dressing changes by Ortho  - Hyperglycemia  - HD -338 1/20/23    PLAN:  NEUROLOGY:  - Pain control: Tylenol, dilaudid PO 2/4/8 for mild/mod/severe pain q4 PRN  - for 1/21 AM, dressing change, pt able to tolerate without any PRN pain medications or conscious sedation     RESPIRATORY:  - No active issues  - Saturating well on room air  - Continuous pulse oximetry  - Maintain O2 saturation >92%    CARDIOVASCULAR:  - continue with home dose Midodrine 10mg TID  - set MAP goal >60, monitor closely for adequate perfusion  - RUE VA duplex performed; patent brachiocephalic AVF      GI / NUTRITION:  - Diet: Consistent carb/renal diet  - Bowel regimen with lactulose and Dulcolax  - Consider holding bowel regimen today given multiple BM overnight      RENAL / GENITOURINARY:  - HD yesterday 1/20, net -300cc   - Monitor electrolytes and replete PRN  - Continue to monitor strict ins and outs q1 hour    HEMATOLOGIC:   - s/p 1U pRBC 1/16 prior to OR   - s/p 1/17 3U pRBC, ddAVP, 2U plasma, 1U plts, 1/18 1U pRBC   - Hgb drop to 7.1 today from 8.5  - f/u PM CBC   - continue with Aspirin  - VTE prophylaxis with HSQ      INFECTIOUS DISEASE:  - Currently afebrile with leukocytosis  - Continue with IV Zosyn for total 2 week course until 1/24    ENDOCRINOLOGY:  - Monitor fingersticks  - Lantus 14U qhs  - With elevated POC glucose, will re-evaluate insulin requirements  - Continue with insulin sliding scale    LINES/TUBES/DRAINS:  - L radial a-line, d/c before transfer to floors  - L femoral CVC, d/c before transfer to floors    Disposition: Eligible for floors as pt is tolerating dressing changes

## 2023-01-21 NOTE — PROGRESS NOTE ADULT - NUTRITIONAL ASSESSMENT
MEDICATIONS  (STANDING):  acetaminophen     Tablet .. 975 milliGRAM(s) Oral every 8 hours  aspirin  chewable 81 milliGRAM(s) Oral daily  chlorhexidine 2% Cloths 1 Application(s) Topical <User Schedule>  dorzolamide 2%/timolol 0.5% Ophthalmic Solution 1 Drop(s) Left EYE two times a day  epoetin deidre-epbx (RETACRIT) Injectable 41397 Unit(s) IV Push <User Schedule>  heparin   Injectable 5000 Unit(s) SubCutaneous every 8 hours  insulin glargine Injectable (LANTUS) 10 Unit(s) SubCutaneous at bedtime  insulin lispro (ADMELOG) corrective regimen sliding scale   SubCutaneous three times a day before meals  insulin lispro (ADMELOG) corrective regimen sliding scale   SubCutaneous at bedtime  insulin lispro Injectable (ADMELOG) 3 Unit(s) SubCutaneous Before meals and at bedtime  methadone    Tablet 10 milliGRAM(s) Oral daily  midodrine. 10 milliGRAM(s) Oral three times a day  multivitamin 1 Tablet(s) Oral daily  piperacillin/tazobactam IVPB.. 3.375 Gram(s) IV Intermittent every 12 hours

## 2023-01-21 NOTE — PROGRESS NOTE ADULT - SUBJECTIVE AND OBJECTIVE BOX
Orthopaedic Surgery Progress Note    Subjective:   Patient seen and examined. No acute events overnight. Dressing change performed at bedside with sedation and patient still experiencing significant pain with two medications. States pain is controlled at baseline. Denies fever/chills/chest pain/shortness of breath/numbness/tingling.      Objective:  T(C): 36.3 (01-21-23 @ 08:00), Max: 36.3 (01-20-23 @ 17:05)  HR: 69 (01-21-23 @ 08:00) (61 - 74)  BP: 127/65 (01-20-23 @ 16:00) (94/53 - 127/65)  RR: 19 (01-21-23 @ 08:00) (12 - 22)  SpO2: 100% (01-21-23 @ 08:00) (92% - 100%)  Wt(kg): --    01-20 @ 07:01  -  01-21 @ 07:00  --------------------------------------------------------  IN: 1460 mL / OUT: 738 mL / NET: 722 mL    01-21 @ 07:01  -  01-21 @ 10:03  --------------------------------------------------------  IN: 100 mL / OUT: 0 mL / NET: 100 mL        Physical Exam:    Gen: NAD  Resp: Nonlabored  R UE:  Nylon sutures intact on dorsal and volar aspect of forearm/wrist/hand  Exposed tendons on volar aspect of hand  3rd ray stump clean with bloody drainage, no signs of infection  Oozing from index finger  Moving fingers  Tenderness distal tips of fingers  Rest of hand WWP  No pain proximally                          7.1    9.68  )-----------( 530      ( 21 Jan 2023 00:35 )             21.9     01-21    136  |  97<L>  |  20  ----------------------------<  201<H>  3.6   |  26  |  5.01<H>    Ca    8.3<L>      21 Jan 2023 00:35  Phos  4.2     01-21  Mg     2.00     01-21    TPro  6.6  /  Alb  3.0<L>  /  TBili  0.3  /  DBili  x   /  AST  14  /  ALT  9   /  AlkPhos  77  01-19   Orthopaedic Surgery Progress Note    Subjective:   Patient seen and examined. No acute events overnight. Dressing change performed at bedside without sedation. States pain is controlled at baseline. Denies fever/chills/chest pain/shortness of breath/numbness/tingling.      Objective:  T(C): 36.3 (01-21-23 @ 08:00), Max: 36.3 (01-20-23 @ 17:05)  HR: 69 (01-21-23 @ 08:00) (61 - 74)  BP: 127/65 (01-20-23 @ 16:00) (94/53 - 127/65)  RR: 19 (01-21-23 @ 08:00) (12 - 22)  SpO2: 100% (01-21-23 @ 08:00) (92% - 100%)  Wt(kg): --    01-20 @ 07:01  -  01-21 @ 07:00  --------------------------------------------------------  IN: 1460 mL / OUT: 738 mL / NET: 722 mL    01-21 @ 07:01  -  01-21 @ 10:03  --------------------------------------------------------  IN: 100 mL / OUT: 0 mL / NET: 100 mL        Physical Exam:    Gen: NAD  Resp: Nonlabored  R UE:  Nylon sutures intact on dorsal and volar aspect of forearm/wrist/hand  Exposed tendons on volar aspect of hand  3rd ray stump clean with bloody drainage, no signs of infection  Oozing from index finger  Moving fingers  Tenderness distal tips of fingers  Rest of hand WWP  No pain proximally                          7.1    9.68  )-----------( 530      ( 21 Jan 2023 00:35 )             21.9     01-21    136  |  97<L>  |  20  ----------------------------<  201<H>  3.6   |  26  |  5.01<H>    Ca    8.3<L>      21 Jan 2023 00:35  Phos  4.2     01-21  Mg     2.00     01-21    TPro  6.6  /  Alb  3.0<L>  /  TBili  0.3  /  DBili  x   /  AST  14  /  ALT  9   /  AlkPhos  77  01-19

## 2023-01-21 NOTE — PROGRESS NOTE ADULT - ASSESSMENT
Patient is a 46 y/o male PMH DM2, Bilateral BKA, ESRD (on HD MWF), last dialyzed yesterday transferred from [facility] emergently for evaluation of evaluation of right finger swelling/pain. Patient reports history of right finger pain after he had a fall one year ago. Patient had a wound on her second/middle finger who he was seeing a hand surgeon for outpatient but unable ultimately to continue seeing due to insurance issues. Patient reports his middle finger developed increased swelling and became black in color about two weeks ago. Denies fevers. Patient transferred to [facility] for further evaluation and emergent OR. Patient received broad spectrum Abx of Zosyn/vanco/clindamycin at [place]. Interpretor phone used for translation with patient. ID# [id number]         septic workup and ID evaluation,send blood and urine cx,serial lactate levels,monitor vitals closley,ivfs hydration,monitor urine output and renal profile,iv abx as per id cons    esrd on hd   Excess fluids and waste products will be removed from your blood; your electrolytes will be balanced; your blood pressure will be controlled.        BP monitoring,continue current antihypertensive meds, low salt diet,followup with PMD in 1-2 weeks      ANEMIA PLAN:  Anemia of chronic disease:  Well controlled by epo  H and H subtherapeutic .  We will continue epo aiming for a HCT of 32-36 %.   We will monitor Iron stores, B12 and RBC folate .  epoetin deidre-epbx (RETACRIT) Injectable 86296 Unit(s) IV Push     Accuchecks monitoring and insulin sliding scale coverage, no concentrated sweets diet, serial labs and f/up with PMD, monitor HB A 1 C every 3-4 mnth  piperacillin/tazobactam IVPB.. 3.375 Gram(s) IV Intermittent every 12 hours

## 2023-01-21 NOTE — PROGRESS NOTE ADULT - SUBJECTIVE AND OBJECTIVE BOX
Patient is a 47y Male whom presented to the hospital with esrd on hd     PAST MEDICAL & SURGICAL HISTORY:      MEDICATIONS  (STANDING):  dextrose 5%. 1000 milliLiter(s) (100 mL/Hr) IV Continuous <Continuous>  dextrose 5%. 1000 milliLiter(s) (50 mL/Hr) IV Continuous <Continuous>  dextrose 50% Injectable 25 Gram(s) IV Push once  dextrose 50% Injectable 25 Gram(s) IV Push once  dextrose 50% Injectable 12.5 Gram(s) IV Push once  glucagon  Injectable 1 milliGRAM(s) IntraMuscular once  insulin lispro (ADMELOG) corrective regimen sliding scale   SubCutaneous every 6 hours  pantoprazole    Tablet 40 milliGRAM(s) Oral daily  polyethylene glycol 3350 17 Gram(s) Oral daily  senna 2 Tablet(s) Oral at bedtime      Allergies    No Known Allergies    Intolerances        SOCIAL HISTORY:  Denies ETOh,Smoking,     FAMILY HISTORY:      REVIEW OF SYSTEMS:    CONSTITUTIONAL: No weakness, fevers or chills  NECK: No pain or stiffness  RESPIRATORY: No cough, wheezing, hemoptysis; No shortness of breath  CARDIOVASCULAR: No chest pain or palpitations  GASTROINTESTINAL: No abdominal or epigastric pain. No nausea, vomiting,                                                 7.4    13.01 )-----------( 588      ( 21 Jan 2023 11:55 )             23.2       CBC Full  -  ( 21 Jan 2023 11:55 )  WBC Count : 13.01 K/uL  RBC Count : 2.54 M/uL  Hemoglobin : 7.4 g/dL  Hematocrit : 23.2 %  Platelet Count - Automated : 588 K/uL  Mean Cell Volume : 91.3 fL  Mean Cell Hemoglobin : 29.1 pg  Mean Cell Hemoglobin Concentration : 31.9 gm/dL  Auto Neutrophil # : x  Auto Lymphocyte # : x  Auto Monocyte # : x  Auto Eosinophil # : x  Auto Basophil # : x  Auto Neutrophil % : x  Auto Lymphocyte % : x  Auto Monocyte % : x  Auto Eosinophil % : x  Auto Basophil % : x      01-21    136  |  97<L>  |  20  ----------------------------<  201<H>  3.6   |  26  |  5.01<H>    Ca    8.3<L>      21 Jan 2023 00:35  Phos  4.2     01-21  Mg     2.00     01-21        CAPILLARY BLOOD GLUCOSE      POCT Blood Glucose.: 188 mg/dL (21 Jan 2023 07:47)  POCT Blood Glucose.: 173 mg/dL (20 Jan 2023 21:52)  POCT Blood Glucose.: 260 mg/dL (20 Jan 2023 17:08)      Vital Signs Last 24 Hrs  T(C): 36.3 (21 Jan 2023 14:30), Max: 36.3 (20 Jan 2023 17:05)  T(F): 97.4 (21 Jan 2023 14:30), Max: 97.4 (21 Jan 2023 14:30)  HR: 67 (21 Jan 2023 14:30) (61 - 73)  BP: 138/63 (21 Jan 2023 14:30) (125/53 - 138/63)  BP(mean): 77 (21 Jan 2023 12:00) (77 - 83)  RR: 17 (21 Jan 2023 14:30) (12 - 19)  SpO2: 100% (21 Jan 2023 14:30) (97% - 100%)    Parameters below as of 21 Jan 2023 14:30  Patient On (Oxygen Delivery Method): room air                                                  PHYSICAL EXAM:    Constitutional: NAD  HEENT: conjunctive   clear   Neck:  No JVD  Respiratory: CTAB  Cardiovascular: S1 and S2  Gastrointestinal: BS+, soft, NT/ND   right hand dressed. bilateral BKA   right arm AVF nontender

## 2023-01-21 NOTE — PROGRESS NOTE ADULT - ASSESSMENT
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 Repeat I+D on 1/14, 1/16, 1/19. Still requires SICU level care for q1h neurovascular checks and sedation due to significant pain during BID dressing changes over extensive forearm/hand wounds with exposed tendons    -Pain control/analgesia  -DVT ppx - Venodynes/HSQ  -FU AM Labs  -Non-weight bearing right upper extremity in bulky dressing  -Daily dressing and packing changes BID  -FU nephrology and HD (MWF)  -FU ID recs (zosyn)  -Appreciate [facility] surgery recs, s/p fistulogram and repeat duplex with good inflow and outflow, FU repeat PPG  -Appreciate ophthalmology recs: outpatient follow-up  -Appreciate SICU level of care, q1HR neurovascular checks A/P: 47y y/o Male 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 Repeat I+D on 1/14, 1/16, 1/19  -Pain control/analgesia  -DVT ppx - Venodynes/HSQ  -FU AM Labs  -Non-weight bearing right upper extremity in bulky dressing  -Daily dressing and packing changes BID  -FU nephrology and HD (MWF)  -FU ID recs (zosyn)  -Appreciate Vasc surgery recs, s/p fistulogram and repeat duplex with good inflow and outflow, FU repeat PPG  -Appreciate ophthalmology recs: outpatient follow-up  -Appreciate SICU level of care, q1HR neurovascular checks

## 2023-01-22 LAB
ANION GAP SERPL CALC-SCNC: 14 MMOL/L — SIGNIFICANT CHANGE UP (ref 7–14)
BUN SERPL-MCNC: 34 MG/DL — HIGH (ref 7–23)
CALCIUM SERPL-MCNC: 8.4 MG/DL — SIGNIFICANT CHANGE UP (ref 8.4–10.5)
CHLORIDE SERPL-SCNC: 97 MMOL/L — LOW (ref 98–107)
CO2 SERPL-SCNC: 25 MMOL/L — SIGNIFICANT CHANGE UP (ref 22–31)
CREAT SERPL-MCNC: 8.35 MG/DL — HIGH (ref 0.5–1.3)
EGFR: 7 ML/MIN/1.73M2 — LOW
GLUCOSE BLDC GLUCOMTR-MCNC: 100 MG/DL — HIGH (ref 70–99)
GLUCOSE BLDC GLUCOMTR-MCNC: 115 MG/DL — HIGH (ref 70–99)
GLUCOSE BLDC GLUCOMTR-MCNC: 146 MG/DL — HIGH (ref 70–99)
GLUCOSE BLDC GLUCOMTR-MCNC: 158 MG/DL — HIGH (ref 70–99)
GLUCOSE SERPL-MCNC: 140 MG/DL — HIGH (ref 70–99)
HCT VFR BLD CALC: 24.7 % — LOW (ref 39–50)
HCT VFR BLD CALC: 29.1 % — LOW (ref 39–50)
HGB BLD-MCNC: 7.6 G/DL — LOW (ref 13–17)
HGB BLD-MCNC: 9.5 G/DL — LOW (ref 13–17)
MAGNESIUM SERPL-MCNC: 2.3 MG/DL — SIGNIFICANT CHANGE UP (ref 1.6–2.6)
MCHC RBC-ENTMCNC: 29.5 PG — SIGNIFICANT CHANGE UP (ref 27–34)
MCHC RBC-ENTMCNC: 30 PG — SIGNIFICANT CHANGE UP (ref 27–34)
MCHC RBC-ENTMCNC: 30.8 GM/DL — LOW (ref 32–36)
MCHC RBC-ENTMCNC: 32.6 GM/DL — SIGNIFICANT CHANGE UP (ref 32–36)
MCV RBC AUTO: 91.8 FL — SIGNIFICANT CHANGE UP (ref 80–100)
MCV RBC AUTO: 95.7 FL — SIGNIFICANT CHANGE UP (ref 80–100)
NRBC # BLD: 0 /100 WBCS — SIGNIFICANT CHANGE UP (ref 0–0)
NRBC # BLD: 0 /100 WBCS — SIGNIFICANT CHANGE UP (ref 0–0)
NRBC # FLD: 0 K/UL — SIGNIFICANT CHANGE UP (ref 0–0)
NRBC # FLD: 0 K/UL — SIGNIFICANT CHANGE UP (ref 0–0)
PHOSPHATE SERPL-MCNC: 6.2 MG/DL — HIGH (ref 2.5–4.5)
PLATELET # BLD AUTO: 430 K/UL — HIGH (ref 150–400)
PLATELET # BLD AUTO: 604 K/UL — HIGH (ref 150–400)
POTASSIUM SERPL-MCNC: 4.2 MMOL/L — SIGNIFICANT CHANGE UP (ref 3.5–5.3)
POTASSIUM SERPL-SCNC: 4.2 MMOL/L — SIGNIFICANT CHANGE UP (ref 3.5–5.3)
RBC # BLD: 2.58 M/UL — LOW (ref 4.2–5.8)
RBC # BLD: 3.17 M/UL — LOW (ref 4.2–5.8)
RBC # FLD: 17.6 % — HIGH (ref 10.3–14.5)
RBC # FLD: 18.4 % — HIGH (ref 10.3–14.5)
SODIUM SERPL-SCNC: 136 MMOL/L — SIGNIFICANT CHANGE UP (ref 135–145)
WBC # BLD: 12.79 K/UL — HIGH (ref 3.8–10.5)
WBC # BLD: 16.61 K/UL — HIGH (ref 3.8–10.5)
WBC # FLD AUTO: 12.79 K/UL — HIGH (ref 3.8–10.5)
WBC # FLD AUTO: 16.61 K/UL — HIGH (ref 3.8–10.5)

## 2023-01-22 PROCEDURE — 93931 UPPER EXTREMITY STUDY: CPT | Mod: 26,RT

## 2023-01-22 RX ADMIN — CHLORHEXIDINE GLUCONATE 1 APPLICATION(S): 213 SOLUTION TOPICAL at 05:56

## 2023-01-22 RX ADMIN — HEPARIN SODIUM 5000 UNIT(S): 5000 INJECTION INTRAVENOUS; SUBCUTANEOUS at 05:47

## 2023-01-22 RX ADMIN — HEPARIN SODIUM 5000 UNIT(S): 5000 INJECTION INTRAVENOUS; SUBCUTANEOUS at 22:11

## 2023-01-22 RX ADMIN — HEPARIN SODIUM 5000 UNIT(S): 5000 INJECTION INTRAVENOUS; SUBCUTANEOUS at 13:49

## 2023-01-22 RX ADMIN — Medication 975 MILLIGRAM(S): at 22:10

## 2023-01-22 RX ADMIN — Medication 4 UNIT(S): at 17:16

## 2023-01-22 RX ADMIN — MIDODRINE HYDROCHLORIDE 10 MILLIGRAM(S): 2.5 TABLET ORAL at 11:52

## 2023-01-22 RX ADMIN — Medication 4 UNIT(S): at 11:51

## 2023-01-22 RX ADMIN — DORZOLAMIDE HYDROCHLORIDE TIMOLOL MALEATE 1 DROP(S): 20; 5 SOLUTION/ DROPS OPHTHALMIC at 17:16

## 2023-01-22 RX ADMIN — INSULIN GLARGINE 14 UNIT(S): 100 INJECTION, SOLUTION SUBCUTANEOUS at 22:10

## 2023-01-22 RX ADMIN — Medication 4 UNIT(S): at 07:41

## 2023-01-22 RX ADMIN — PIPERACILLIN AND TAZOBACTAM 25 GRAM(S): 4; .5 INJECTION, POWDER, LYOPHILIZED, FOR SOLUTION INTRAVENOUS at 05:48

## 2023-01-22 RX ADMIN — Medication 975 MILLIGRAM(S): at 05:46

## 2023-01-22 RX ADMIN — Medication 1 TABLET(S): at 11:52

## 2023-01-22 RX ADMIN — Medication 975 MILLIGRAM(S): at 13:49

## 2023-01-22 RX ADMIN — MIDODRINE HYDROCHLORIDE 10 MILLIGRAM(S): 2.5 TABLET ORAL at 05:46

## 2023-01-22 RX ADMIN — Medication 81 MILLIGRAM(S): at 11:52

## 2023-01-22 RX ADMIN — PIPERACILLIN AND TAZOBACTAM 25 GRAM(S): 4; .5 INJECTION, POWDER, LYOPHILIZED, FOR SOLUTION INTRAVENOUS at 17:15

## 2023-01-22 RX ADMIN — DORZOLAMIDE HYDROCHLORIDE TIMOLOL MALEATE 1 DROP(S): 20; 5 SOLUTION/ DROPS OPHTHALMIC at 05:47

## 2023-01-22 NOTE — PROGRESS NOTE ADULT - SUBJECTIVE AND OBJECTIVE BOX
Patient is a 47y Male whom presented to the hospital with esrd on hd     PAST MEDICAL & SURGICAL HISTORY:      MEDICATIONS  (STANDING):  dextrose 5%. 1000 milliLiter(s) (100 mL/Hr) IV Continuous <Continuous>  dextrose 5%. 1000 milliLiter(s) (50 mL/Hr) IV Continuous <Continuous>  dextrose 50% Injectable 25 Gram(s) IV Push once  dextrose 50% Injectable 25 Gram(s) IV Push once  dextrose 50% Injectable 12.5 Gram(s) IV Push once  glucagon  Injectable 1 milliGRAM(s) IntraMuscular once  insulin lispro (ADMELOG) corrective regimen sliding scale   SubCutaneous every 6 hours  pantoprazole    Tablet 40 milliGRAM(s) Oral daily  polyethylene glycol 3350 17 Gram(s) Oral daily  senna 2 Tablet(s) Oral at bedtime      Allergies    No Known Allergies    Intolerances        SOCIAL HISTORY:  Denies ETOh,Smoking,     FAMILY HISTORY:      REVIEW OF SYSTEMS:    CONSTITUTIONAL: No weakness, fevers or chills  NECK: No pain or stiffness  RESPIRATORY: No cough, wheezing, hemoptysis; No shortness of breath  CARDIOVASCULAR: No chest pain or palpitations  GASTROINTESTINAL: No abdominal or epigastric pain. No nausea, vomiting,                                                                 9.5    16.61 )-----------( 430      ( 22 Jan 2023 16:45 )             29.1       CBC Full  -  ( 22 Jan 2023 16:45 )  WBC Count : 16.61 K/uL  RBC Count : 3.17 M/uL  Hemoglobin : 9.5 g/dL  Hematocrit : 29.1 %  Platelet Count - Automated : 430 K/uL  Mean Cell Volume : 91.8 fL  Mean Cell Hemoglobin : 30.0 pg  Mean Cell Hemoglobin Concentration : 32.6 gm/dL  Auto Neutrophil # : x  Auto Lymphocyte # : x  Auto Monocyte # : x  Auto Eosinophil # : x  Auto Basophil # : x  Auto Neutrophil % : x  Auto Lymphocyte % : x  Auto Monocyte % : x  Auto Eosinophil % : x  Auto Basophil % : x      01-22    136  |  97<L>  |  34<H>  ----------------------------<  140<H>  4.2   |  25  |  8.35<H>    Ca    8.4      22 Jan 2023 05:20  Phos  6.2     01-22  Mg     2.30     01-22        CAPILLARY BLOOD GLUCOSE      POCT Blood Glucose.: 100 mg/dL (22 Jan 2023 16:33)  POCT Blood Glucose.: 115 mg/dL (22 Jan 2023 11:21)  POCT Blood Glucose.: 146 mg/dL (22 Jan 2023 06:57)  POCT Blood Glucose.: 189 mg/dL (21 Jan 2023 21:47)      Vital Signs Last 24 Hrs  T(C): 36.4 (22 Jan 2023 15:11), Max: 36.9 (22 Jan 2023 10:46)  T(F): 97.6 (22 Jan 2023 15:11), Max: 98.5 (22 Jan 2023 10:46)  HR: 71 (22 Jan 2023 15:11) (66 - 75)  BP: 167/71 (22 Jan 2023 15:11) (114/55 - 167/71)  BP(mean): --  RR: 16 (22 Jan 2023 15:11) (16 - 18)  SpO2: 100% (22 Jan 2023 15:11) (98% - 100%)    Parameters below as of 22 Jan 2023 15:11  Patient On (Oxygen Delivery Method): room air                                                        PHYSICAL EXAM:    Constitutional: NAD  HEENT: conjunctive   clear   Neck:  No JVD  Respiratory: CTAB  Cardiovascular: S1 and S2  Gastrointestinal: BS+, soft, NT/ND   right hand dressed. bilateral BKA   right arm AVF nontender

## 2023-01-22 NOTE — PROGRESS NOTE ADULT - ASSESSMENT
Patient is a 46 y/o male PMH DM2, Bilateral BKA, ESRD (on HD MWF), last dialyzed yesterday transferred from [facility] emergently for evaluation of evaluation of right finger swelling/pain. Patient reports history of right finger pain after he had a fall one year ago. Patient had a wound on her second/middle finger who he was seeing a hand surgeon for outpatient but unable ultimately to continue seeing due to insurance issues. Patient reports his middle finger developed increased swelling and became black in color about two weeks ago. Denies fevers. Patient transferred to [facility] for further evaluation and emergent OR. Patient received broad spectrum Abx of Zosyn/vanco/clindamycin at [place]. Interpretor phone used for translation with patient. ID# [id number]         septic workup and ID evaluation,send blood and urine cx,serial lactate levels,monitor vitals closley,ivfs hydration,monitor urine output and renal profile,iv abx as per id cons  piperacillin/tazobactam IVPB.. 3.375 Gram(s) IV Intermittent every 12 hours    esrd on hd   Excess fluids and waste products will be removed from your blood; your electrolytes will be balanced; your blood pressure will be controlled.    BP monitoring,continue current antihypertensive meds, low salt diet,followup with PMD in 1-2 weeks      ANEMIA PLAN:  Anemia of chronic disease:  Well controlled by epo  H and H subtherapeutic .  We will continue epo aiming for a HCT of 32-36 %.   We will monitor Iron stores, B12 and RBC folate .  epoetin deidre-epbx (RETACRIT) Injectable 14384 Unit(s) IV Push     Accuchecks monitoring and insulin sliding scale coverage, no concentrated sweets diet, serial labs and f/up with PMD, monitor HB A 1 C every 3-4 mnth  piperacillin/tazobactam IVPB.. 3.375 Gram(s) IV Intermittent every 12 hours

## 2023-01-22 NOTE — PROGRESS NOTE ADULT - ASSESSMENT
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 Repeat I+D on 1/14, 1/16, 1/19  -Pain control/analgesia  -DVT ppx - Venodynes/HSQ  -FU AM Labs  -Non-weight bearing right upper extremity in bulky dressing  -Daily dressing and packing changes BID  -FU nephrology and HD (MWF)  -FU ID recs (zosyn)  -Appreciate Vasc surgery recs, s/p fistulogram and repeat duplex with good inflow and outflow, FU repeat PPG  -Appreciate ophthalmology recs: outpatient follow-up

## 2023-01-22 NOTE — PROGRESS NOTE ADULT - SUBJECTIVE AND OBJECTIVE BOX
ORTHOPAEDICS DAILY PROGRESS NOTE:       SUBJECTIVE/ROS: Downgraded from SICU yesterday. Seen and examined. Pain well controlled. Hgb 7.6 this AM, 1 u prbc ordered         MEDICATIONS  (STANDING):  acetaminophen     Tablet .. 975 milliGRAM(s) Oral every 8 hours  aspirin  chewable 81 milliGRAM(s) Oral daily  chlorhexidine 2% Cloths 1 Application(s) Topical <User Schedule>  dorzolamide 2%/timolol 0.5% Ophthalmic Solution 1 Drop(s) Left EYE two times a day  epoetin deidre-epbx (RETACRIT) Injectable 88120 Unit(s) IV Push <User Schedule>  heparin   Injectable 5000 Unit(s) SubCutaneous every 8 hours  insulin glargine Injectable (LANTUS) 14 Unit(s) SubCutaneous at bedtime  insulin lispro (ADMELOG) corrective regimen sliding scale   SubCutaneous three times a day before meals  insulin lispro (ADMELOG) corrective regimen sliding scale   SubCutaneous at bedtime  insulin lispro Injectable (ADMELOG) 4 Unit(s) SubCutaneous three times a day before meals  midodrine. 10 milliGRAM(s) Oral three times a day  multivitamin 1 Tablet(s) Oral daily  piperacillin/tazobactam IVPB.. 3.375 Gram(s) IV Intermittent every 12 hours    MEDICATIONS  (PRN):  bisacodyl Suppository 10 milliGRAM(s) Rectal daily PRN If no bowel movement  HYDROmorphone   Tablet 2 milliGRAM(s) Oral every 4 hours PRN Mild Pain (1 - 3)  HYDROmorphone   Tablet 4 milliGRAM(s) Oral every 4 hours PRN Moderate Pain (4 - 6)  HYDROmorphone   Tablet 8 milliGRAM(s) Oral every 4 hours PRN Severe Pain (7 - 10)  lactulose Syrup 10 Gram(s) Oral daily PRN Constipation      OBJECTIVE:    Vital Signs Last 24 Hrs  T(C): 36.7 (2023 09:07), Max: 36.7 (2023 01:32)  T(F): 98.1 (2023 09:07), Max: 98.1 (2023 09:07)  HR: 68 (2023 09:07) (66 - 73)  BP: 143/70 (2023 09:07) (114/55 - 143/70)  BP(mean): 77 (2023 12:00) (77 - 77)  RR: 18 (2023 09:07) (15 - 18)  SpO2: 100% (2023 09:07) (98% - 100%)    Parameters below as of 2023 09:07  Patient On (Oxygen Delivery Method): room air        I&O's Detail    2023 07:01  -  2023 07:00  --------------------------------------------------------  IN:    IV PiggyBack: 100 mL    Oral Fluid: 240 mL  Total IN: 340 mL    OUT:  Total OUT: 0 mL    Total NET: 340 mL      2023 07:01  -  2023 09:50  --------------------------------------------------------  IN:    Oral Fluid: 360 mL  Total IN: 360 mL    OUT:  Total OUT: 0 mL    Total NET: 360 mL          Daily     Daily Weight in k.4 (2023 01:32)    LABS:                        7.6    12.79 )-----------( 604      ( 2023 05:20 )             24.7     01-22    136  |  97<L>  |  34<H>  ----------------------------<  140<H>  4.2   |  25  |  8.35<H>    Ca    8.4      2023 05:20  Phos  6.2       Mg     2.30                         PHYSICAL EXAM:  Resp: Nonlabored  R UE:  Nylon sutures intact on dorsal and volar aspect of forearm/wrist/hand  Exposed tendons on volar aspect of hand  3rd ray stump clean with bloody drainage, no signs of infection  Oozing from index finger  Moving fingers  Tenderness distal tips of fingers  Rest of hand WWP  No pain proximally

## 2023-01-22 NOTE — PROGRESS NOTE ADULT - NUTRITIONAL ASSESSMENT
MEDICATIONS  (STANDING):  acetaminophen     Tablet .. 975 milliGRAM(s) Oral every 8 hours  aspirin  chewable 81 milliGRAM(s) Oral daily  chlorhexidine 2% Cloths 1 Application(s) Topical <User Schedule>  dorzolamide 2%/timolol 0.5% Ophthalmic Solution 1 Drop(s) Left EYE two times a day  epoetin deidre-epbx (RETACRIT) Injectable 87632 Unit(s) IV Push <User Schedule>  heparin   Injectable 5000 Unit(s) SubCutaneous every 8 hours  insulin glargine Injectable (LANTUS) 14 Unit(s) SubCutaneous at bedtime  insulin lispro (ADMELOG) corrective regimen sliding scale   SubCutaneous three times a day before meals  insulin lispro (ADMELOG) corrective regimen sliding scale   SubCutaneous at bedtime  insulin lispro Injectable (ADMELOG) 4 Unit(s) SubCutaneous three times a day before meals  midodrine. 10 milliGRAM(s) Oral three times a day  multivitamin 1 Tablet(s) Oral daily  piperacillin/tazobactam IVPB.. 3.375 Gram(s) IV Intermittent every 12 hours

## 2023-01-23 DIAGNOSIS — Z01.810 ENCOUNTER FOR PREPROCEDURAL CARDIOVASCULAR EXAMINATION: ICD-10-CM

## 2023-01-23 LAB
ANION GAP SERPL CALC-SCNC: 19 MMOL/L — HIGH (ref 7–14)
BUN SERPL-MCNC: 46 MG/DL — HIGH (ref 7–23)
CALCIUM SERPL-MCNC: 8.3 MG/DL — LOW (ref 8.4–10.5)
CHLORIDE SERPL-SCNC: 99 MMOL/L — SIGNIFICANT CHANGE UP (ref 98–107)
CO2 SERPL-SCNC: 20 MMOL/L — LOW (ref 22–31)
CREAT SERPL-MCNC: 10.4 MG/DL — HIGH (ref 0.5–1.3)
EGFR: 6 ML/MIN/1.73M2 — LOW
GLUCOSE BLDC GLUCOMTR-MCNC: 121 MG/DL — HIGH (ref 70–99)
GLUCOSE BLDC GLUCOMTR-MCNC: 143 MG/DL — HIGH (ref 70–99)
GLUCOSE BLDC GLUCOMTR-MCNC: 157 MG/DL — HIGH (ref 70–99)
GLUCOSE BLDC GLUCOMTR-MCNC: 161 MG/DL — HIGH (ref 70–99)
GLUCOSE SERPL-MCNC: 131 MG/DL — HIGH (ref 70–99)
HCT VFR BLD CALC: 25.9 % — LOW (ref 39–50)
HGB BLD-MCNC: 8.1 G/DL — LOW (ref 13–17)
MCHC RBC-ENTMCNC: 29.5 PG — SIGNIFICANT CHANGE UP (ref 27–34)
MCHC RBC-ENTMCNC: 31.3 GM/DL — LOW (ref 32–36)
MCV RBC AUTO: 94.2 FL — SIGNIFICANT CHANGE UP (ref 80–100)
NRBC # BLD: 0 /100 WBCS — SIGNIFICANT CHANGE UP (ref 0–0)
NRBC # FLD: 0 K/UL — SIGNIFICANT CHANGE UP (ref 0–0)
PLATELET # BLD AUTO: 642 K/UL — HIGH (ref 150–400)
POTASSIUM SERPL-MCNC: 4.5 MMOL/L — SIGNIFICANT CHANGE UP (ref 3.5–5.3)
POTASSIUM SERPL-SCNC: 4.5 MMOL/L — SIGNIFICANT CHANGE UP (ref 3.5–5.3)
RBC # BLD: 2.75 M/UL — LOW (ref 4.2–5.8)
RBC # FLD: 18.3 % — HIGH (ref 10.3–14.5)
SARS-COV-2 RNA SPEC QL NAA+PROBE: SIGNIFICANT CHANGE UP
SODIUM SERPL-SCNC: 138 MMOL/L — SIGNIFICANT CHANGE UP (ref 135–145)
WBC # BLD: 10.96 K/UL — HIGH (ref 3.8–10.5)
WBC # FLD AUTO: 10.96 K/UL — HIGH (ref 3.8–10.5)

## 2023-01-23 PROCEDURE — 99233 SBSQ HOSP IP/OBS HIGH 50: CPT

## 2023-01-23 RX ORDER — SODIUM CHLORIDE 9 MG/ML
1000 INJECTION INTRAMUSCULAR; INTRAVENOUS; SUBCUTANEOUS
Refills: 0 | Status: DISCONTINUED | OUTPATIENT
Start: 2023-01-23 | End: 2023-01-25

## 2023-01-23 RX ORDER — INSULIN GLARGINE 100 [IU]/ML
7 INJECTION, SOLUTION SUBCUTANEOUS AT BEDTIME
Refills: 0 | Status: COMPLETED | OUTPATIENT
Start: 2023-01-23 | End: 2023-01-23

## 2023-01-23 RX ADMIN — Medication 975 MILLIGRAM(S): at 21:26

## 2023-01-23 RX ADMIN — Medication 4 UNIT(S): at 07:50

## 2023-01-23 RX ADMIN — DORZOLAMIDE HYDROCHLORIDE TIMOLOL MALEATE 1 DROP(S): 20; 5 SOLUTION/ DROPS OPHTHALMIC at 06:34

## 2023-01-23 RX ADMIN — INSULIN GLARGINE 7 UNIT(S): 100 INJECTION, SOLUTION SUBCUTANEOUS at 22:14

## 2023-01-23 RX ADMIN — HEPARIN SODIUM 5000 UNIT(S): 5000 INJECTION INTRAVENOUS; SUBCUTANEOUS at 06:32

## 2023-01-23 RX ADMIN — DORZOLAMIDE HYDROCHLORIDE TIMOLOL MALEATE 1 DROP(S): 20; 5 SOLUTION/ DROPS OPHTHALMIC at 18:00

## 2023-01-23 RX ADMIN — Medication 4 UNIT(S): at 16:50

## 2023-01-23 RX ADMIN — Medication 2: at 11:33

## 2023-01-23 RX ADMIN — Medication 4 UNIT(S): at 11:33

## 2023-01-23 RX ADMIN — Medication 81 MILLIGRAM(S): at 14:12

## 2023-01-23 RX ADMIN — PIPERACILLIN AND TAZOBACTAM 25 GRAM(S): 4; .5 INJECTION, POWDER, LYOPHILIZED, FOR SOLUTION INTRAVENOUS at 06:34

## 2023-01-23 RX ADMIN — Medication 975 MILLIGRAM(S): at 06:33

## 2023-01-23 RX ADMIN — HEPARIN SODIUM 5000 UNIT(S): 5000 INJECTION INTRAVENOUS; SUBCUTANEOUS at 21:26

## 2023-01-23 RX ADMIN — CHLORHEXIDINE GLUCONATE 1 APPLICATION(S): 213 SOLUTION TOPICAL at 11:34

## 2023-01-23 RX ADMIN — Medication 1 TABLET(S): at 14:13

## 2023-01-23 RX ADMIN — HEPARIN SODIUM 5000 UNIT(S): 5000 INJECTION INTRAVENOUS; SUBCUTANEOUS at 14:13

## 2023-01-23 RX ADMIN — ERYTHROPOIETIN 10000 UNIT(S): 10000 INJECTION, SOLUTION INTRAVENOUS; SUBCUTANEOUS at 23:56

## 2023-01-23 RX ADMIN — Medication 975 MILLIGRAM(S): at 14:13

## 2023-01-23 NOTE — PROGRESS NOTE ADULT - NUTRITIONAL ASSESSMENT
MEDICATIONS  (STANDING):  acetaminophen     Tablet .. 975 milliGRAM(s) Oral every 8 hours  aspirin  chewable 81 milliGRAM(s) Oral daily  chlorhexidine 2% Cloths 1 Application(s) Topical <User Schedule>  dorzolamide 2%/timolol 0.5% Ophthalmic Solution 1 Drop(s) Left EYE two times a day  epoetin deidre-epbx (RETACRIT) Injectable 87544 Unit(s) IV Push <User Schedule>  heparin   Injectable 5000 Unit(s) SubCutaneous every 8 hours  insulin glargine Injectable (LANTUS) 14 Unit(s) SubCutaneous at bedtime  insulin lispro (ADMELOG) corrective regimen sliding scale   SubCutaneous three times a day before meals  insulin lispro (ADMELOG) corrective regimen sliding scale   SubCutaneous at bedtime  insulin lispro Injectable (ADMELOG) 4 Unit(s) SubCutaneous three times a day before meals  midodrine. 10 milliGRAM(s) Oral three times a day  multivitamin 1 Tablet(s) Oral daily  piperacillin/tazobactam IVPB.. 3.375 Gram(s) IV Intermittent every 12 hours

## 2023-01-23 NOTE — PROGRESS NOTE ADULT - SUBJECTIVE AND OBJECTIVE BOX
SURGERY  Pager: [phone]      INTERVAL EVENTS/SUBJECTIVE: Patient seen and examined at bedside.     ______________________________________________  OBJECTIVE:   T(C): 36.9 (01-23-23 @ 09:24), Max: 37.1 (01-23-23 @ 05:52)  HR: 78 (01-23-23 @ 09:24) (67 - 78)  BP: 149/66 (01-23-23 @ 09:24) (125/65 - 167/71)  RR: 18 (01-23-23 @ 09:24) (16 - 18)  SpO2: 99% (01-23-23 @ 09:24) (99% - 100%)  Wt(kg): --  CAPILLARY BLOOD GLUCOSE  143 (23 Jan 2023 07:45)      POCT Blood Glucose.: 143 mg/dL (23 Jan 2023 07:34)  POCT Blood Glucose.: 158 mg/dL (22 Jan 2023 21:21)  POCT Blood Glucose.: 100 mg/dL (22 Jan 2023 16:33)  POCT Blood Glucose.: 115 mg/dL (22 Jan 2023 11:21)    I&O's Detail    22 Jan 2023 07:01  -  23 Jan 2023 07:00  --------------------------------------------------------  IN:    Oral Fluid: 1080 mL    PRBCs (Packed Red Blood Cells): 300 mL  Total IN: 1380 mL    OUT:  Total OUT: 0 mL    Total NET: 1380 mL          Physical exam:   General: Appears well, NAD. Non toxic appearing, sitting up in bed   Abdomen: soft, nontender, nondistended, no rebound or guarding  Extremities: RUE in ACE bandage. +thrill over fistula.   ______________________________________________  LABS:  CBC Full  -  ( 23 Jan 2023 07:09 )  WBC Count : 10.96 K/uL  RBC Count : 2.75 M/uL  Hemoglobin : 8.1 g/dL  Hematocrit : 25.9 %  Platelet Count - Automated : 642 K/uL  Mean Cell Volume : 94.2 fL  Mean Cell Hemoglobin : 29.5 pg  Mean Cell Hemoglobin Concentration : 31.3 gm/dL  Auto Neutrophil # : x  Auto Lymphocyte # : x  Auto Monocyte # : x  Auto Eosinophil # : x  Auto Basophil # : x  Auto Neutrophil % : x  Auto Lymphocyte % : x  Auto Monocyte % : x  Auto Eosinophil % : x  Auto Basophil % : x    01-23    138  |  99  |  46<H>  ----------------------------<  131<H>  4.5   |  20<L>  |  10.40<H>    Ca    8.3<L>      23 Jan 2023 07:09  Phos  6.2     01-22  Mg     2.30     01-22      _____________________________________________  RADIOLOGY:

## 2023-01-23 NOTE — PROGRESS NOTE ADULT - ASSESSMENT
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 Repeat I+D on 1/14, 1/16, 1/19  -Pain control/analgesia  -DVT ppx - Venodynes/HSQ  -FU AM Labs  -Non-weight bearing right upper extremity in bulky dressing  -Daily dressing and packing changes QD  -FU nephrology and HD (MWF)  -FU ID recs (zosyn)  -Appreciate Vasc surgery recs, s/p fistulogram and repeat duplex with good inflow and outflow, FU repeat PPG  -Appreciate ophthalmology recs: outpatient follow-up A/P: 47y y/o Male 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 Repeat I+D on 1/14, 1/16, 1/19  -Pain control/analgesia  -DVT ppx - Venodynes/HSQ  -FU AM Labs  -Non-weight bearing right upper extremity in bulky dressing  -Daily dressing and packing changes QD  -FU nephrology and HD (MWF)  -FU ID recs (zosyn)  -Appreciate Vasc surgery recs  -Appreciate ophthalmology recs: outpatient follow-up

## 2023-01-23 NOTE — PROGRESS NOTE ADULT - ASSESSMENT
47M DM2, PVD s/p b/l BKA, ESRD on HD who p/w gangrene of RUE third digit s/p Amputation and repeated I&D on 1/14, 1/16, 1/19.

## 2023-01-23 NOTE — PROGRESS NOTE ADULT - PROBLEM SELECTOR PLAN 3
Recently on HD MWF  RUE fistula s/p repeair and clear for use by Vacular   HD per Nephrology, currently no plans for urgent dialysis    C/w midodrine as well as epo injections. Recent A1c 7.1, though this may be an underestimate in setting of anemia related to ESRD. Glucose has been over 200 in the mornings.   - increased lantus to 8 units tonight (ordered)   - Continue correctional admelog and monitor glucose in-house (goal 100-180).

## 2023-01-23 NOTE — PROGRESS NOTE ADULT - ASSESSMENT
Patient is a 46 y/o male PMH DM2, Bilateral BKA, ESRD (on HD MWF), last dialyzed yesterday transferred from [facility] emergently for evaluation of evaluation of right finger swelling/pain. Patient reports history of right finger pain after he had a fall one year ago. Patient had a wound on her second/middle finger who he was seeing a hand surgeon for outpatient but unable ultimately to continue seeing due to insurance issues. Patient reports his middle finger developed increased swelling and became black in color about two weeks ago. Denies fevers. Patient transferred to [facility] for further evaluation and emergent OR. Patient received broad spectrum Abx of Zosyn/vanco/clindamycin at [place]. Interpretor phone used for translation with patient. ID# [id number]         septic workup and ID evaluation,send blood and urine cx,serial lactate levels,monitor vitals closley,ivfs hydration,monitor urine output and renal profile,iv abx as per id cons  piperacillin/tazobactam IVPB.. 3.375 Gram(s) IV Intermittent every 12 hours    esrd on hd   Excess fluids and waste products will be removed from your blood; your electrolytes will be balanced; your blood pressure will be controlled.    BP monitoring,continue current antihypertensive meds, low salt diet,followup with PMD in 1-2 weeks      ANEMIA PLAN:  Anemia of chronic disease:  Well controlled by epo  H and H subtherapeutic .  We will continue epo aiming for a HCT of 32-36 %.   We will monitor Iron stores, B12 and RBC folate .  epoetin deidre-epbx (RETACRIT) Injectable 81344 Unit(s) IV Push     Accuchecks monitoring and insulin sliding scale coverage, no concentrated sweets diet, serial labs and f/up with PMD, monitor HB A 1 C every 3-4 mnth  piperacillin/tazobactam IVPB.. 3.375 Gram(s) IV Intermittent every 12 hours

## 2023-01-23 NOTE — PROGRESS NOTE ADULT - TIME BILLING
Time spend review of laboratory data, consultants' recommendations, documentation in [place], performing medically appropriate examinations/evaluations, discussion with patient/RN/ACP and interdisciplinary staff (such as , social workers, etc), counseling patient/family/care giver, ordering medically appropriate medication, tests, or procedures. Interventions were performed as documented above.

## 2023-01-23 NOTE — PROGRESS NOTE ADULT - ASSESSMENT
47M with steal syndrome s/p AVF proximalization of arterial inflow using right GSV (1/13).    PLAN:   - Duplex reviewed, flow 2850 in proximal segment, with good inflow and outflow  - Please obtain repeat PPG  - Can use AVF as needed    Vascular Surgery  #[id number] 47M with steal syndrome s/p AVF proximalization of arterial inflow using right GSV (1/13).    PLAN:   - Duplex reviewed, flow 2850 in proximal segment, with good inflow and outflow  - PPGs reviewed  - Can use AVF as needed  - Please call back with questions    Vascular Surgery  #[id number]

## 2023-01-23 NOTE — PROGRESS NOTE ADULT - SUBJECTIVE AND OBJECTIVE BOX
Patient is a 47y Male whom presented to the hospital with esrd on hd     PAST MEDICAL & SURGICAL HISTORY:      MEDICATIONS  (STANDING):  dextrose 5%. 1000 milliLiter(s) (100 mL/Hr) IV Continuous <Continuous>  dextrose 5%. 1000 milliLiter(s) (50 mL/Hr) IV Continuous <Continuous>  dextrose 50% Injectable 25 Gram(s) IV Push once  dextrose 50% Injectable 25 Gram(s) IV Push once  dextrose 50% Injectable 12.5 Gram(s) IV Push once  glucagon  Injectable 1 milliGRAM(s) IntraMuscular once  insulin lispro (ADMELOG) corrective regimen sliding scale   SubCutaneous every 6 hours  pantoprazole    Tablet 40 milliGRAM(s) Oral daily  polyethylene glycol 3350 17 Gram(s) Oral daily  senna 2 Tablet(s) Oral at bedtime      Allergies    No Known Allergies    Intolerances        SOCIAL HISTORY:  Denies ETOh,Smoking,     FAMILY HISTORY:      REVIEW OF SYSTEMS:    CONSTITUTIONAL: No weakness, fevers or chills  NECK: No pain or stiffness  RESPIRATORY: No cough, wheezing, hemoptysis; No shortness of breath  CARDIOVASCULAR: No chest pain or palpitations  GASTROINTESTINAL: No abdominal or epigastric pain. No nausea, vomiting,                                                                                  8.1    10.96 )-----------( 642      ( 23 Jan 2023 07:09 )             25.9       CBC Full  -  ( 23 Jan 2023 07:09 )  WBC Count : 10.96 K/uL  RBC Count : 2.75 M/uL  Hemoglobin : 8.1 g/dL  Hematocrit : 25.9 %  Platelet Count - Automated : 642 K/uL  Mean Cell Volume : 94.2 fL  Mean Cell Hemoglobin : 29.5 pg  Mean Cell Hemoglobin Concentration : 31.3 gm/dL  Auto Neutrophil # : x  Auto Lymphocyte # : x  Auto Monocyte # : x  Auto Eosinophil # : x  Auto Basophil # : x  Auto Neutrophil % : x  Auto Lymphocyte % : x  Auto Monocyte % : x  Auto Eosinophil % : x  Auto Basophil % : x      01-23    138  |  99  |  46<H>  ----------------------------<  131<H>  4.5   |  20<L>  |  10.40<H>    Ca    8.3<L>      23 Jan 2023 07:09  Phos  6.2     01-22  Mg     2.30     01-22        CAPILLARY BLOOD GLUCOSE  143 (23 Jan 2023 07:45)      POCT Blood Glucose.: 121 mg/dL (23 Jan 2023 16:38)  POCT Blood Glucose.: 157 mg/dL (23 Jan 2023 11:18)  POCT Blood Glucose.: 143 mg/dL (23 Jan 2023 07:34)  POCT Blood Glucose.: 158 mg/dL (22 Jan 2023 21:21)      Vital Signs Last 24 Hrs  T(C): 36.8 (23 Jan 2023 17:34), Max: 37.1 (23 Jan 2023 05:52)  T(F): 98.3 (23 Jan 2023 17:34), Max: 98.8 (23 Jan 2023 05:52)  HR: 74 (23 Jan 2023 17:34) (65 - 78)  BP: 169/82 (23 Jan 2023 17:34) (137/60 - 169/82)  BP(mean): --  RR: 18 (23 Jan 2023 17:34) (16 - 18)  SpO2: 100% (23 Jan 2023 17:34) (99% - 100%)    Parameters below as of 23 Jan 2023 17:34  Patient On (Oxygen Delivery Method): room air                                                        PHYSICAL EXAM:    Constitutional: NAD  HEENT: conjunctive   clear   Neck:  No JVD  Respiratory: CTAB  Cardiovascular: S1 and S2  Gastrointestinal: BS+, soft, NT/ND   right hand dressed. bilateral BKA   right arm AVF nontender

## 2023-01-23 NOTE — PROGRESS NOTE ADULT - PROBLEM SELECTOR PLAN 2
Recent A1c 7.1, though this may be an underestimate in setting of anemia related to ESRD. Glucose has been over 200 in the mornings.   - increased lantus to 8 units tonight (ordered)   - Continue correctional admelog and monitor glucose in-house (goal 100-180). # Sepsis secondary to RUE 3rd digit gangrene, POA:  - s/p Right 3rd finger amputation at metacarpal head  - Vascular consult appreciated, s/p RUE fistula repair on 1/13 for Vascular steal syndrome.  - s/p repeated I&D by ortho.   - ID recs appreciated - complete zosyn on 1/24, prior culture grew  polymicrobial E coli, Strep, Bacteroides  - now stable from infectious stand point.   - f/u vascular recs.   - f/u ortho for wound care.

## 2023-01-23 NOTE — PROGRESS NOTE ADULT - SUBJECTIVE AND OBJECTIVE BOX
[facility] Division of Hospital Medicine  Owen Alvarez MD  In House Pager [phone]    Patient is a 47y old  Male who presents with a chief complaint of Steal syndrome (21 Jan 2023 00:53)    SUBJECTIVE / OVERNIGHT EVENTS: no acute events. patient seen ambulating with PT. has no acute complaints. feeling well overall.     ROS: Denied Fever, Chill, CP, SOB, Abd pain, N/V/D, LE swelling or pain.     MEDICATIONS  (STANDING):  acetaminophen     Tablet .. 975 milliGRAM(s) Oral every 8 hours  aspirin  chewable 81 milliGRAM(s) Oral daily  chlorhexidine 2% Cloths 1 Application(s) Topical <User Schedule>  dorzolamide 2%/timolol 0.5% Ophthalmic Solution 1 Drop(s) Left EYE two times a day  epoetin deidre-epbx (RETACRIT) Injectable 03931 Unit(s) IV Push <User Schedule>  heparin   Injectable 5000 Unit(s) SubCutaneous every 8 hours  insulin glargine Injectable (LANTUS) 14 Unit(s) SubCutaneous at bedtime  insulin lispro (ADMELOG) corrective regimen sliding scale   SubCutaneous three times a day before meals  insulin lispro (ADMELOG) corrective regimen sliding scale   SubCutaneous at bedtime  insulin lispro Injectable (ADMELOG) 4 Unit(s) SubCutaneous three times a day before meals  midodrine. 10 milliGRAM(s) Oral three times a day  multivitamin 1 Tablet(s) Oral daily  piperacillin/tazobactam IVPB.. 3.375 Gram(s) IV Intermittent every 12 hours    MEDICATIONS  (PRN):  bisacodyl Suppository 10 milliGRAM(s) Rectal daily PRN If no bowel movement  HYDROmorphone   Tablet 2 milliGRAM(s) Oral every 4 hours PRN Mild Pain (1 - 3)  HYDROmorphone   Tablet 4 milliGRAM(s) Oral every 4 hours PRN Moderate Pain (4 - 6)  HYDROmorphone   Tablet 8 milliGRAM(s) Oral every 4 hours PRN Severe Pain (7 - 10)  lactulose Syrup 10 Gram(s) Oral daily PRN Constipation    CAPILLARY BLOOD GLUCOSE  143 (23 Jan 2023 07:45)      POCT Blood Glucose.: 157 mg/dL (23 Jan 2023 11:18)  POCT Blood Glucose.: 143 mg/dL (23 Jan 2023 07:34)  POCT Blood Glucose.: 158 mg/dL (22 Jan 2023 21:21)  POCT Blood Glucose.: 100 mg/dL (22 Jan 2023 16:33)    I&O's Summary    22 Jan 2023 07:01  -  23 Jan 2023 07:00  --------------------------------------------------------  IN: 1380 mL / OUT: 0 mL / NET: 1380 mL      PHYSICAL EXAM:  Vital Signs Last 24 Hrs  T(C): 36.9 (23 Jan 2023 09:24), Max: 37.1 (23 Jan 2023 05:52)  T(F): 98.4 (23 Jan 2023 09:24), Max: 98.8 (23 Jan 2023 05:52)  HR: 78 (23 Jan 2023 09:24) (67 - 78)  BP: 149/66 (23 Jan 2023 09:24) (137/60 - 167/71)  BP(mean): --  RR: 18 (23 Jan 2023 09:24) (16 - 18)  SpO2: 99% (23 Jan 2023 09:24) (99% - 100%)    Parameters below as of 23 Jan 2023 09:24  Patient On (Oxygen Delivery Method): room air      Gen: NAD; sitting in bed comfortably   Pulm: no accessory muscle use; lungs clear on auscultation bilaterally; no wheezing or crackles.   Cards: RRR, nl S1/S2; no LE edema; no JVD  Abd: non-distended; soft and NT on exam; +bs  Ext: DENAE; s/p BL BKA with prothesis in place. R hand with dressing in place.   Neuro: Awake and Alert; non-focal; moving all extremities.   Skin: no new rashes; warm to touch;     LABS:                        8.1    10.96 )-----------( 642      ( 23 Jan 2023 07:09 )             25.9     01-23    138  |  99  |  46<H>  ----------------------------<  131<H>  4.5   |  20<L>  |  10.40<H>    Ca    8.3<L>      23 Jan 2023 07:09  Phos  6.2     01-22  Mg     2.30     01-22                RADIOLOGY & ADDITIONAL TESTS:  Results Reviewed: Y  Imaging Personally Reviewed: Y  Electrocardiogram Personally Reviewed: Y    COORDINATION OF CARE:  Care Discussed with Consultants/Other Providers [Y/N]: Y  Prior or Outpatient Records Reviewed [Y/N]: Y

## 2023-01-23 NOTE — PROGRESS NOTE ADULT - PROBLEM SELECTOR PLAN 1
# Sepsis secondary to RUE 3rd digit gangrene, POA:  - s/p Right 3rd finger amputation at metacarpal head  - Vascular consult appreciated, s/p RUE fistula repair on 1/13 for Vascular steal syndrome.  - s/p repeated I&D by ortho.   - ID recs appreciated - complete zosyn on 1/24, prior culture grew  polymicrobial E coli, Strep, Bacteroides  - now stable from infectious stand point.   - f/u vascular recs.   - f/u ortho for wound care. Pt anticipated to RTOR for repeat I&D on 1/24.   RCRI = 2 (Class III) connoting 10.1% 30-day risk of major adverse cardiac event. Pt is without acute cardiac condition and tolerated three prior I&D on this admission.  Patient with elevated cardiovascular risk.   remain clinically euvolemic. no signs of ACS or decompensated HF.   may proceed with surgery w/o further cardiac workup.   decrease lantus by 50% the night prior to OR. hold pre-meal insulin on day of OR. maybe resume current insulin regimen post-op. Pt anticipated to RTOR for repeat I&D on 1/24.   RCRI = 2 (Class III) connoting 10.1% 30-day risk of major adverse cardiac event. Pt is without acute cardiac condition and tolerated three prior I&D on this admission.  Patient with elevated cardiovascular risk.   echo 1/13 showed EF 62%, nl LV function, mild MS.   remain clinically euvolemic. no signs of ACS or decompensated HF.   may proceed with surgery w/o further cardiac workup.   decrease lantus by 50% the night prior to OR. hold pre-meal insulin on day of OR. maybe resume current insulin regimen post-op.

## 2023-01-23 NOTE — PROGRESS NOTE ADULT - PROBLEM SELECTOR PLAN 4
Pt reporting inability to see out of left eye, and vision grossly impaired on exam. Pt is at risk for diabetic retinopathy as well as other vascular pathology (such as retinal artery/vein occlusion) in setting of polyvascular disease. Appreciate Ophthalmology consult. Recently on HD MWF  RUE fistula s/p repeair and clear for use by Vacular   HD per Nephrology, currently no plans for urgent dialysis    C/w midodrine as well as epo injections.

## 2023-01-23 NOTE — PROGRESS NOTE ADULT - SUBJECTIVE AND OBJECTIVE BOX
ORTHOPAEDICS DAILY PROGRESS NOTE:     SUBJECTIVE/ROS: Seen and examined. Pain well controlled. Received 1U PRBCs yesterday.       OBJECTIVE:  Vital Signs Last 24 Hrs  T(C): 37.1 (23 Jan 2023 05:52), Max: 37.1 (23 Jan 2023 05:52)  T(F): 98.8 (23 Jan 2023 05:52), Max: 98.8 (23 Jan 2023 05:52)  HR: 68 (23 Jan 2023 05:52) (67 - 77)  BP: 152/69 (23 Jan 2023 05:52) (125/65 - 167/71)  RR: 17 (23 Jan 2023 05:52) (16 - 18)  SpO2: 99% (23 Jan 2023 05:52) (99% - 100%)  Parameters below as of 23 Jan 2023 05:52  Patient On (Oxygen Delivery Method): room air      LABS:                                   8.1    10.96 )-----------( 642      ( 23 Jan 2023 07:09 )             25.9   01-23    138  |  99  |  46<H>  ----------------------------<  131<H>  4.5   |  20<L>  |  10.40<H>    Ca    8.3<L>      23 Jan 2023 07:09  Phos  6.2     01-22  Mg     2.30     01-22        PHYSICAL EXAM:  Resp: Nonlabored  R UE:  Nylon sutures intact on dorsal and volar aspect of forearm/wrist/hand  Exposed tendons on volar aspect of hand  3rd ray stump clean with bloody drainage, no signs of infection  Oozing from index finger  Moving fingers  Tenderness distal tips of fingers  Rest of hand WWP  No pain proximally

## 2023-01-24 ENCOUNTER — TRANSCRIPTION ENCOUNTER (OUTPATIENT)
Age: 48
End: 2023-01-24

## 2023-01-24 DIAGNOSIS — I96 GANGRENE, NOT ELSEWHERE CLASSIFIED: ICD-10-CM

## 2023-01-24 DIAGNOSIS — T82.898A OTHER SPECIFIED COMPLICATION OF VASCULAR PROSTHETIC DEVICES, IMPLANTS AND GRAFTS, INITIAL ENCOUNTER: ICD-10-CM

## 2023-01-24 DIAGNOSIS — Z29.9 ENCOUNTER FOR PROPHYLACTIC MEASURES, UNSPECIFIED: ICD-10-CM

## 2023-01-24 LAB
ANION GAP SERPL CALC-SCNC: 14 MMOL/L — SIGNIFICANT CHANGE UP (ref 7–14)
APTT BLD: 30.1 SEC — SIGNIFICANT CHANGE UP (ref 27–36.3)
BLD GP AB SCN SERPL QL: NEGATIVE — SIGNIFICANT CHANGE UP
BUN SERPL-MCNC: 23 MG/DL — SIGNIFICANT CHANGE UP (ref 7–23)
CALCIUM SERPL-MCNC: 8.6 MG/DL — SIGNIFICANT CHANGE UP (ref 8.4–10.5)
CHLORIDE SERPL-SCNC: 98 MMOL/L — SIGNIFICANT CHANGE UP (ref 98–107)
CO2 SERPL-SCNC: 25 MMOL/L — SIGNIFICANT CHANGE UP (ref 22–31)
CREAT SERPL-MCNC: 5.05 MG/DL — HIGH (ref 0.5–1.3)
EGFR: 13 ML/MIN/1.73M2 — LOW
GLUCOSE BLDC GLUCOMTR-MCNC: 112 MG/DL — HIGH (ref 70–99)
GLUCOSE BLDC GLUCOMTR-MCNC: 139 MG/DL — HIGH (ref 70–99)
GLUCOSE BLDC GLUCOMTR-MCNC: 221 MG/DL — HIGH (ref 70–99)
GLUCOSE BLDC GLUCOMTR-MCNC: 229 MG/DL — HIGH (ref 70–99)
GLUCOSE SERPL-MCNC: 143 MG/DL — HIGH (ref 70–99)
HCT VFR BLD CALC: 27.4 % — LOW (ref 39–50)
HGB BLD-MCNC: 8.9 G/DL — LOW (ref 13–17)
INR BLD: 1.03 RATIO — SIGNIFICANT CHANGE UP (ref 0.88–1.16)
MCHC RBC-ENTMCNC: 29.7 PG — SIGNIFICANT CHANGE UP (ref 27–34)
MCHC RBC-ENTMCNC: 32.5 GM/DL — SIGNIFICANT CHANGE UP (ref 32–36)
MCV RBC AUTO: 91.3 FL — SIGNIFICANT CHANGE UP (ref 80–100)
NRBC # BLD: 0 /100 WBCS — SIGNIFICANT CHANGE UP (ref 0–0)
NRBC # FLD: 0 K/UL — SIGNIFICANT CHANGE UP (ref 0–0)
PLATELET # BLD AUTO: 609 K/UL — HIGH (ref 150–400)
POTASSIUM SERPL-MCNC: 3.4 MMOL/L — LOW (ref 3.5–5.3)
POTASSIUM SERPL-SCNC: 3.4 MMOL/L — LOW (ref 3.5–5.3)
PROTHROM AB SERPL-ACNC: 12 SEC — SIGNIFICANT CHANGE UP (ref 10.5–13.4)
RBC # BLD: 3 M/UL — LOW (ref 4.2–5.8)
RBC # FLD: 17.9 % — HIGH (ref 10.3–14.5)
RH IG SCN BLD-IMP: POSITIVE — SIGNIFICANT CHANGE UP
SODIUM SERPL-SCNC: 137 MMOL/L — SIGNIFICANT CHANGE UP (ref 135–145)
WBC # BLD: 9.86 K/UL — SIGNIFICANT CHANGE UP (ref 3.8–10.5)
WBC # FLD AUTO: 9.86 K/UL — SIGNIFICANT CHANGE UP (ref 3.8–10.5)

## 2023-01-24 PROCEDURE — 99233 SBSQ HOSP IP/OBS HIGH 50: CPT

## 2023-01-24 RX ORDER — INSULIN GLARGINE 100 [IU]/ML
7 INJECTION, SOLUTION SUBCUTANEOUS AT BEDTIME
Refills: 0 | Status: DISCONTINUED | OUTPATIENT
Start: 2023-01-24 | End: 2023-01-25

## 2023-01-24 RX ORDER — POTASSIUM CHLORIDE 20 MEQ
20 PACKET (EA) ORAL ONCE
Refills: 0 | Status: DISCONTINUED | OUTPATIENT
Start: 2023-01-24 | End: 2023-01-24

## 2023-01-24 RX ORDER — INSULIN LISPRO 100/ML
VIAL (ML) SUBCUTANEOUS
Refills: 0 | Status: DISCONTINUED | OUTPATIENT
Start: 2023-01-24 | End: 2023-02-07

## 2023-01-24 RX ORDER — INSULIN LISPRO 100/ML
VIAL (ML) SUBCUTANEOUS EVERY 6 HOURS
Refills: 0 | Status: DISCONTINUED | OUTPATIENT
Start: 2023-01-24 | End: 2023-01-24

## 2023-01-24 RX ORDER — INSULIN LISPRO 100/ML
VIAL (ML) SUBCUTANEOUS AT BEDTIME
Refills: 0 | Status: DISCONTINUED | OUTPATIENT
Start: 2023-01-24 | End: 2023-02-07

## 2023-01-24 RX ADMIN — HYDROMORPHONE HYDROCHLORIDE 8 MILLIGRAM(S): 2 INJECTION INTRAMUSCULAR; INTRAVENOUS; SUBCUTANEOUS at 21:11

## 2023-01-24 RX ADMIN — HYDROMORPHONE HYDROCHLORIDE 8 MILLIGRAM(S): 2 INJECTION INTRAMUSCULAR; INTRAVENOUS; SUBCUTANEOUS at 12:35

## 2023-01-24 RX ADMIN — HYDROMORPHONE HYDROCHLORIDE 8 MILLIGRAM(S): 2 INJECTION INTRAMUSCULAR; INTRAVENOUS; SUBCUTANEOUS at 11:35

## 2023-01-24 RX ADMIN — CHLORHEXIDINE GLUCONATE 1 APPLICATION(S): 213 SOLUTION TOPICAL at 06:59

## 2023-01-24 RX ADMIN — Medication 4: at 07:04

## 2023-01-24 RX ADMIN — Medication 975 MILLIGRAM(S): at 05:09

## 2023-01-24 RX ADMIN — Medication 81 MILLIGRAM(S): at 15:14

## 2023-01-24 RX ADMIN — Medication 1 TABLET(S): at 15:13

## 2023-01-24 RX ADMIN — Medication 2: at 16:57

## 2023-01-24 RX ADMIN — SODIUM CHLORIDE 75 MILLILITER(S): 9 INJECTION INTRAMUSCULAR; INTRAVENOUS; SUBCUTANEOUS at 00:33

## 2023-01-24 RX ADMIN — INSULIN GLARGINE 7 UNIT(S): 100 INJECTION, SOLUTION SUBCUTANEOUS at 22:42

## 2023-01-24 RX ADMIN — Medication 4 UNIT(S): at 16:57

## 2023-01-24 RX ADMIN — Medication 975 MILLIGRAM(S): at 21:10

## 2023-01-24 RX ADMIN — DORZOLAMIDE HYDROCHLORIDE TIMOLOL MALEATE 1 DROP(S): 20; 5 SOLUTION/ DROPS OPHTHALMIC at 19:37

## 2023-01-24 RX ADMIN — PIPERACILLIN AND TAZOBACTAM 25 GRAM(S): 4; .5 INJECTION, POWDER, LYOPHILIZED, FOR SOLUTION INTRAVENOUS at 15:12

## 2023-01-24 RX ADMIN — Medication 975 MILLIGRAM(S): at 15:13

## 2023-01-24 RX ADMIN — PIPERACILLIN AND TAZOBACTAM 25 GRAM(S): 4; .5 INJECTION, POWDER, LYOPHILIZED, FOR SOLUTION INTRAVENOUS at 02:12

## 2023-01-24 RX ADMIN — DORZOLAMIDE HYDROCHLORIDE TIMOLOL MALEATE 1 DROP(S): 20; 5 SOLUTION/ DROPS OPHTHALMIC at 05:11

## 2023-01-24 RX ADMIN — HYDROMORPHONE HYDROCHLORIDE 8 MILLIGRAM(S): 2 INJECTION INTRAMUSCULAR; INTRAVENOUS; SUBCUTANEOUS at 22:11

## 2023-01-24 NOTE — PROGRESS NOTE ADULT - PROBLEM SELECTOR PLAN 2
A1C 7.1%  - home regimen: Basaglar 5U qhs + Humalog 4U TID qac  - c/w Lantus 14U qhs + Admelog 4U TID qac  - if NPO: decrease Lantus by 50% (7U at bedtime) and hold premeal Admelog, SSI [id number]  - CC + Renal restricted diet

## 2023-01-24 NOTE — PROGRESS NOTE ADULT - SUBJECTIVE AND OBJECTIVE BOX
Yamilet Reyna MD  [facility] Division of Hospital Medicine  Pager [phone] (M-F 8AM-5PM)  Other Times: [phone]    Patient is a 47y old  Male who presents with a chief complaint of Steal syndrome (21 Jan 2023 00:53)    SUBJECTIVE / OVERNIGHT EVENTS: no acute events overnight    MEDICATIONS  (STANDING):  acetaminophen     Tablet .. 975 milliGRAM(s) Oral every 8 hours  aspirin  chewable 81 milliGRAM(s) Oral daily  chlorhexidine 2% Cloths 1 Application(s) Topical <User Schedule>  dorzolamide 2%/timolol 0.5% Ophthalmic Solution 1 Drop(s) Left EYE two times a day  epoetin deidre-epbx (RETACRIT) Injectable 40115 Unit(s) IV Push <User Schedule>  insulin lispro (ADMELOG) corrective regimen sliding scale   SubCutaneous every 6 hours  insulin lispro Injectable (ADMELOG) 4 Unit(s) SubCutaneous three times a day before meals  midodrine. 10 milliGRAM(s) Oral three times a day  multivitamin 1 Tablet(s) Oral daily  piperacillin/tazobactam IVPB.. 3.375 Gram(s) IV Intermittent every 12 hours  sodium chloride 0.9%. 1000 milliLiter(s) (75 mL/Hr) IV Continuous <Continuous>    MEDICATIONS  (PRN):  bisacodyl Suppository 10 milliGRAM(s) Rectal daily PRN If no bowel movement  HYDROmorphone   Tablet 2 milliGRAM(s) Oral every 4 hours PRN Mild Pain (1 - 3)  HYDROmorphone   Tablet 4 milliGRAM(s) Oral every 4 hours PRN Moderate Pain (4 - 6)  HYDROmorphone   Tablet 8 milliGRAM(s) Oral every 4 hours PRN Severe Pain (7 - 10)  lactulose Syrup 10 Gram(s) Oral daily PRN Constipation      PHYSICAL EXAM:  Vital Signs Last 24 Hrs  T(C): 36.4 (24 Jan 2023 09:46), Max: 37.1 (24 Jan 2023 05:40)  T(F): 97.6 (24 Jan 2023 09:46), Max: 98.7 (24 Jan 2023 05:40)  HR: 68 (24 Jan 2023 09:46) (62 - 75)  BP: 123/71 (24 Jan 2023 09:46) (123/71 - 180/90)  RR: 18 (24 Jan 2023 09:46) (16 - 18)  SpO2: 100% (24 Jan 2023 09:46) (100% - 100%)    Parameters below as of 24 Jan 2023 09:46  Patient On (Oxygen Delivery Method): room air        CONSTITUTIONAL: NAD, well-developed, well-groomed  RESPIRATORY: Normal respiratory effort; lungs are clear to auscultation bilaterally  CARDIOVASCULAR: Regular rate and rhythm, normal S1 and S2, no murmur/rub/gallop; No lower extremity edema  GASTROINTESTINAL: Nontender to palpation, normoactive bowel sounds, no rebound/guarding; No hepatosplenomegaly  MUSCULOSKELETAL:  no clubbing or cyanosis of digits; no joint swelling or tenderness to palpation  NEUROLOGY: non-focal; no gross sensory deficits   PSYCH: A+O to person, place, and time; affect appropriate  SKIN: right hand in dressing    LABS:                        8.9    9.86  )-----------( 609      ( 24 Jan 2023 02:00 )             27.4     01-24    137  |  98  |  23  ----------------------------<  143<H>  3.4<L>   |  25  |  5.05<H>    Ca    8.6      24 Jan 2023 02:00      PT/INR - ( 24 Jan 2023 02:00 )   PT: 12.0 sec;   INR: 1.03 ratio         PTT - ( 24 Jan 2023 02:00 )  PTT:30.1 sec            RADIOLOGY & ADDITIONAL TESTS:  Results Reviewed:   Imaging Personally Reviewed:  Electrocardiogram Personally Reviewed:    COORDINATION OF CARE:  Care Discussed with Consultants/Other Providers [Y/N]:  Prior or Outpatient Records Reviewed [Y/N]:

## 2023-01-24 NOTE — PROGRESS NOTE ADULT - NUTRITIONAL ASSESSMENT
MEDICATIONS  (STANDING):  acetaminophen     Tablet .. 975 milliGRAM(s) Oral every 8 hours  aspirin  chewable 81 milliGRAM(s) Oral daily  chlorhexidine 2% Cloths 1 Application(s) Topical <User Schedule>  dorzolamide 2%/timolol 0.5% Ophthalmic Solution 1 Drop(s) Left EYE two times a day  epoetin deidre-epbx (RETACRIT) Injectable 65381 Unit(s) IV Push <User Schedule>  insulin glargine Injectable (LANTUS) 7 Unit(s) SubCutaneous at bedtime  insulin lispro (ADMELOG) corrective regimen sliding scale   SubCutaneous three times a day before meals  insulin lispro (ADMELOG) corrective regimen sliding scale   SubCutaneous at bedtime  insulin lispro Injectable (ADMELOG) 4 Unit(s) SubCutaneous three times a day before meals  midodrine. 10 milliGRAM(s) Oral three times a day  multivitamin 1 Tablet(s) Oral daily  piperacillin/tazobactam IVPB.. 3.375 Gram(s) IV Intermittent every 12 hours  sodium chloride 0.9%. 1000 milliLiter(s) (75 mL/Hr) IV Continuous <Continuous>

## 2023-01-24 NOTE — PROGRESS NOTE ADULT - ASSESSMENT
47M with hx of ESRD on HD, PVD s/p bilateral BKA, T2DM who initially presented to [facility] with right finger swelling and pain, found to have steal syndrome transferred to [facility] for further management. s/p revision of AVF with vascular surgery 1/13, and s/p right 3rd finger amputation and repeated I&D with ortho (1/14 + 1/16 + 1/19). Briefly required SICU stay for hemorrhagic show, now on ortho floors for further management.

## 2023-01-24 NOTE — PROGRESS NOTE ADULT - PROBLEM SELECTOR PLAN 4
reporting inability to see out of left eye, and vision grossly impaired on exam, patient is at risk fo diabetic retinopathy and vascular pathology   - seen by opthalmology: severe proliferative diabetic retinopathy, start Cosopt BID OS  - no further inpatient intervention, outpatient followup

## 2023-01-24 NOTE — PROGRESS NOTE ADULT - ASSESSMENT
Patient is a 46 y/o male PMH DM2, Bilateral BKA, ESRD (on HD MWF), last dialyzed yesterday transferred from [facility] emergently for evaluation of evaluation of right finger swelling/pain. Patient reports history of right finger pain after he had a fall one year ago. Patient had a wound on her second/middle finger who he was seeing a hand surgeon for outpatient but unable ultimately to continue seeing due to insurance issues. Patient reports his middle finger developed increased swelling and became black in color about two weeks ago. Denies fevers. Patient transferred to [facility] for further evaluation and emergent OR. Patient received broad spectrum Abx of Zosyn/vanco/clindamycin at [place]. Interpretor phone used for translation with patient. ID# [id number]         septic workup and ID evaluation,send blood and urine cx,serial lactate levels,monitor vitals closley,ivfs hydration,monitor urine output and renal profile,iv abx as per id cons  piperacillin/tazobactam IVPB.. 3.375 Gram(s) IV Intermittent every 12 hours    esrd on hd   Excess fluids and waste products will be removed from your blood; your electrolytes will be balanced; your blood pressure will be controlled.    BP monitoring,continue current antihypertensive meds, low salt diet,followup with PMD in 1-2 weeks      ANEMIA PLAN:  Anemia of chronic disease:  Well controlled by epo  H and H subtherapeutic .  We will continue epo aiming for a HCT of 32-36 %.   We will monitor Iron stores, B12 and RBC folate .  epoetin deidre-epbx (RETACRIT) Injectable 87439 Unit(s) IV Push     Accuchecks monitoring and insulin sliding scale coverage, no concentrated sweets diet, serial labs and f/up with PMD, monitor HB A 1 C every 3-4 mnth  piperacillin/tazobactam IVPB.. 3.375 Gram(s) IV Intermittent every 12 hours

## 2023-01-24 NOTE — PROGRESS NOTE ADULT - PROBLEM SELECTOR PLAN 1
initially presented with right finger swelling and pain found to have steal syndrome iso of RUE AVF  - s/p RUE fistula repair with vascular surgery on 1/13   - s/p right 3rd finger amputation and repeated I&D with ortho (1/14 + 1/16 + 1/19)  - ID following: wound cultures grew polymicrobial E coli, Strep, Bacteroides, complete zosyn 1/24  - management per ortho + vascular surgery   - pain control + bowel regimen   - PREOP: RTOR for repeat I&D 1/24. RCRI 2 (10.1% 30 day risk of MACE). patient without acute cardiac complaints, no s/s of ACS/decompensated HF. TTE 1/13 showed EF 62%, nl LV function, mild MS. Medically optimized to proceed to OR. right 3rd digit gangrene d/t steal syndrome iso of RUE AVF  - s/p RUE fistula repair with vascular surgery on 1/13   - s/p right 3rd finger amputation and repeated I&D with ortho (1/14 + 1/16 + 1/19)  - ID following: wound cultures grew polymicrobial E coli, Strep, Bacteroides, complete zosyn 1/24  - management per ortho + vascular surgery   - pain control + bowel regimen   - PREOP: RTOR for repeat I&D 1/24. RCRI 2 (10.1% 30 day risk of MACE). patient without acute cardiac complaints, no s/s of ACS/decompensated HF. TTE 1/13 showed EF 62%, nl LV function, mild MS. Medically optimized to proceed to OR.

## 2023-01-24 NOTE — PROGRESS NOTE ADULT - SUBJECTIVE AND OBJECTIVE BOX
Patient is a 47y Male whom presented to the hospital with esrd on hd     PAST MEDICAL & SURGICAL HISTORY:      MEDICATIONS  (STANDING):  dextrose 5%. 1000 milliLiter(s) (100 mL/Hr) IV Continuous <Continuous>  dextrose 5%. 1000 milliLiter(s) (50 mL/Hr) IV Continuous <Continuous>  dextrose 50% Injectable 25 Gram(s) IV Push once  dextrose 50% Injectable 25 Gram(s) IV Push once  dextrose 50% Injectable 12.5 Gram(s) IV Push once  glucagon  Injectable 1 milliGRAM(s) IntraMuscular once  insulin lispro (ADMELOG) corrective regimen sliding scale   SubCutaneous every 6 hours  pantoprazole    Tablet 40 milliGRAM(s) Oral daily  polyethylene glycol 3350 17 Gram(s) Oral daily  senna 2 Tablet(s) Oral at bedtime      Allergies    No Known Allergies    Intolerances        SOCIAL HISTORY:  Denies ETOh,Smoking,     FAMILY HISTORY:      REVIEW OF SYSTEMS:    CONSTITUTIONAL: No weakness, fevers or chills  NECK: No pain or stiffness  RESPIRATORY: No cough, wheezing, hemoptysis; No shortness of breath  CARDIOVASCULAR: No chest pain or palpitations  GASTROINTESTINAL: No abdominal or epigastric pain. No nausea, vomiting,                                                                                                     8.9    9.86  )-----------( 609      ( 24 Jan 2023 02:00 )             27.4       CBC Full  -  ( 24 Jan 2023 02:00 )  WBC Count : 9.86 K/uL  RBC Count : 3.00 M/uL  Hemoglobin : 8.9 g/dL  Hematocrit : 27.4 %  Platelet Count - Automated : 609 K/uL  Mean Cell Volume : 91.3 fL  Mean Cell Hemoglobin : 29.7 pg  Mean Cell Hemoglobin Concentration : 32.5 gm/dL  Auto Neutrophil # : x  Auto Lymphocyte # : x  Auto Monocyte # : x  Auto Eosinophil # : x  Auto Basophil # : x  Auto Neutrophil % : x  Auto Lymphocyte % : x  Auto Monocyte % : x  Auto Eosinophil % : x  Auto Basophil % : x      01-24    137  |  98  |  23  ----------------------------<  143<H>  3.4<L>   |  25  |  5.05<H>    Ca    8.6      24 Jan 2023 02:00        CAPILLARY BLOOD GLUCOSE      POCT Blood Glucose.: 112 mg/dL (24 Jan 2023 11:23)  POCT Blood Glucose.: 221 mg/dL (24 Jan 2023 06:10)  POCT Blood Glucose.: 161 mg/dL (23 Jan 2023 21:27)  POCT Blood Glucose.: 121 mg/dL (23 Jan 2023 16:38)      Vital Signs Last 24 Hrs  T(C): 36.4 (24 Jan 2023 09:46), Max: 37.1 (24 Jan 2023 05:40)  T(F): 97.6 (24 Jan 2023 09:46), Max: 98.7 (24 Jan 2023 05:40)  HR: 68 (24 Jan 2023 09:46) (62 - 75)  BP: 123/71 (24 Jan 2023 09:46) (123/71 - 180/90)  BP(mean): --  RR: 18 (24 Jan 2023 09:46) (16 - 18)  SpO2: 100% (24 Jan 2023 09:46) (100% - 100%)    Parameters below as of 24 Jan 2023 09:46  Patient On (Oxygen Delivery Method): room air            PT/INR - ( 24 Jan 2023 02:00 )   PT: 12.0 sec;   INR: 1.03 ratio         PTT - ( 24 Jan 2023 02:00 )  PTT:30.1 sec                                          PHYSICAL EXAM:    Constitutional: NAD  HEENT: conjunctive   clear   Neck:  No JVD  Respiratory: CTAB  Cardiovascular: S1 and S2  Gastrointestinal: BS+, soft, NT/ND   right hand dressed. bilateral BKA   right arm AVF nontender

## 2023-01-25 LAB
ANION GAP SERPL CALC-SCNC: 16 MMOL/L — HIGH (ref 7–14)
APTT BLD: 30.2 SEC — SIGNIFICANT CHANGE UP (ref 27–36.3)
BUN SERPL-MCNC: 37 MG/DL — HIGH (ref 7–23)
CALCIUM SERPL-MCNC: 8.2 MG/DL — LOW (ref 8.4–10.5)
CHLORIDE SERPL-SCNC: 97 MMOL/L — LOW (ref 98–107)
CO2 SERPL-SCNC: 22 MMOL/L — SIGNIFICANT CHANGE UP (ref 22–31)
CREAT SERPL-MCNC: 7.98 MG/DL — HIGH (ref 0.5–1.3)
EGFR: 8 ML/MIN/1.73M2 — LOW
GLUCOSE BLDC GLUCOMTR-MCNC: 104 MG/DL — HIGH (ref 70–99)
GLUCOSE BLDC GLUCOMTR-MCNC: 116 MG/DL — HIGH (ref 70–99)
GLUCOSE BLDC GLUCOMTR-MCNC: 151 MG/DL — HIGH (ref 70–99)
GLUCOSE BLDC GLUCOMTR-MCNC: 177 MG/DL — HIGH (ref 70–99)
GLUCOSE BLDC GLUCOMTR-MCNC: 214 MG/DL — HIGH (ref 70–99)
GLUCOSE BLDC GLUCOMTR-MCNC: 223 MG/DL — HIGH (ref 70–99)
GLUCOSE SERPL-MCNC: 176 MG/DL — HIGH (ref 70–99)
HCT VFR BLD CALC: 28.1 % — LOW (ref 39–50)
HGB BLD-MCNC: 8.7 G/DL — LOW (ref 13–17)
INR BLD: 1.09 RATIO — SIGNIFICANT CHANGE UP (ref 0.88–1.16)
MAGNESIUM SERPL-MCNC: 2.1 MG/DL — SIGNIFICANT CHANGE UP (ref 1.6–2.6)
MCHC RBC-ENTMCNC: 29.5 PG — SIGNIFICANT CHANGE UP (ref 27–34)
MCHC RBC-ENTMCNC: 31 GM/DL — LOW (ref 32–36)
MCV RBC AUTO: 95.3 FL — SIGNIFICANT CHANGE UP (ref 80–100)
NRBC # BLD: 0 /100 WBCS — SIGNIFICANT CHANGE UP (ref 0–0)
NRBC # FLD: 0 K/UL — SIGNIFICANT CHANGE UP (ref 0–0)
PHOSPHATE SERPL-MCNC: 6.9 MG/DL — HIGH (ref 2.5–4.5)
PLATELET # BLD AUTO: 585 K/UL — HIGH (ref 150–400)
POTASSIUM SERPL-MCNC: 4.9 MMOL/L — SIGNIFICANT CHANGE UP (ref 3.5–5.3)
POTASSIUM SERPL-SCNC: 4.9 MMOL/L — SIGNIFICANT CHANGE UP (ref 3.5–5.3)
PROTHROM AB SERPL-ACNC: 12.6 SEC — SIGNIFICANT CHANGE UP (ref 10.5–13.4)
RBC # BLD: 2.95 M/UL — LOW (ref 4.2–5.8)
RBC # FLD: 18.2 % — HIGH (ref 10.3–14.5)
SODIUM SERPL-SCNC: 135 MMOL/L — SIGNIFICANT CHANGE UP (ref 135–145)
WBC # BLD: 9.44 K/UL — SIGNIFICANT CHANGE UP (ref 3.8–10.5)
WBC # FLD AUTO: 9.44 K/UL — SIGNIFICANT CHANGE UP (ref 3.8–10.5)

## 2023-01-25 PROCEDURE — 11043 DBRDMT MUSC&/FSCA 1ST 20/<: CPT | Mod: 58,59,RT

## 2023-01-25 PROCEDURE — 99232 SBSQ HOSP IP/OBS MODERATE 35: CPT

## 2023-01-25 PROCEDURE — 11044 DBRDMT BONE 1ST 20 SQ CM/<: CPT | Mod: 58,RT

## 2023-01-25 RX ORDER — SODIUM CHLORIDE 9 MG/ML
1000 INJECTION INTRAMUSCULAR; INTRAVENOUS; SUBCUTANEOUS
Refills: 0 | Status: DISCONTINUED | OUTPATIENT
Start: 2023-01-25 | End: 2023-01-25

## 2023-01-25 RX ORDER — INSULIN GLARGINE 100 [IU]/ML
14 INJECTION, SOLUTION SUBCUTANEOUS AT BEDTIME
Refills: 0 | Status: DISCONTINUED | OUTPATIENT
Start: 2023-01-25 | End: 2023-01-31

## 2023-01-25 RX ORDER — HYDROMORPHONE HYDROCHLORIDE 2 MG/ML
1 INJECTION INTRAMUSCULAR; INTRAVENOUS; SUBCUTANEOUS
Refills: 0 | Status: DISCONTINUED | OUTPATIENT
Start: 2023-01-25 | End: 2023-01-25

## 2023-01-25 RX ORDER — HYDROMORPHONE HYDROCHLORIDE 2 MG/ML
0.5 INJECTION INTRAMUSCULAR; INTRAVENOUS; SUBCUTANEOUS
Refills: 0 | Status: DISCONTINUED | OUTPATIENT
Start: 2023-01-25 | End: 2023-01-25

## 2023-01-25 RX ORDER — ONDANSETRON 8 MG/1
4 TABLET, FILM COATED ORAL ONCE
Refills: 0 | Status: DISCONTINUED | OUTPATIENT
Start: 2023-01-25 | End: 2023-01-25

## 2023-01-25 RX ADMIN — Medication 975 MILLIGRAM(S): at 22:32

## 2023-01-25 RX ADMIN — DORZOLAMIDE HYDROCHLORIDE TIMOLOL MALEATE 1 DROP(S): 20; 5 SOLUTION/ DROPS OPHTHALMIC at 05:35

## 2023-01-25 RX ADMIN — CHLORHEXIDINE GLUCONATE 1 APPLICATION(S): 213 SOLUTION TOPICAL at 17:09

## 2023-01-25 RX ADMIN — DORZOLAMIDE HYDROCHLORIDE TIMOLOL MALEATE 1 DROP(S): 20; 5 SOLUTION/ DROPS OPHTHALMIC at 17:07

## 2023-01-25 RX ADMIN — Medication 1 TABLET(S): at 13:22

## 2023-01-25 RX ADMIN — HYDROMORPHONE HYDROCHLORIDE 8 MILLIGRAM(S): 2 INJECTION INTRAMUSCULAR; INTRAVENOUS; SUBCUTANEOUS at 05:34

## 2023-01-25 RX ADMIN — HYDROMORPHONE HYDROCHLORIDE 0.5 MILLIGRAM(S): 2 INJECTION INTRAMUSCULAR; INTRAVENOUS; SUBCUTANEOUS at 13:05

## 2023-01-25 RX ADMIN — Medication 1: at 06:36

## 2023-01-25 RX ADMIN — INSULIN GLARGINE 14 UNIT(S): 100 INJECTION, SOLUTION SUBCUTANEOUS at 22:32

## 2023-01-25 RX ADMIN — HYDROMORPHONE HYDROCHLORIDE 0.5 MILLIGRAM(S): 2 INJECTION INTRAMUSCULAR; INTRAVENOUS; SUBCUTANEOUS at 13:30

## 2023-01-25 RX ADMIN — PIPERACILLIN AND TAZOBACTAM 25 GRAM(S): 4; .5 INJECTION, POWDER, LYOPHILIZED, FOR SOLUTION INTRAVENOUS at 01:28

## 2023-01-25 RX ADMIN — ERYTHROPOIETIN 10000 UNIT(S): 10000 INJECTION, SOLUTION INTRAVENOUS; SUBCUTANEOUS at 19:12

## 2023-01-25 RX ADMIN — Medication 975 MILLIGRAM(S): at 13:27

## 2023-01-25 RX ADMIN — HYDROMORPHONE HYDROCHLORIDE 8 MILLIGRAM(S): 2 INJECTION INTRAMUSCULAR; INTRAVENOUS; SUBCUTANEOUS at 06:24

## 2023-01-25 RX ADMIN — Medication 2: at 17:06

## 2023-01-25 RX ADMIN — Medication 975 MILLIGRAM(S): at 05:35

## 2023-01-25 RX ADMIN — Medication 81 MILLIGRAM(S): at 13:23

## 2023-01-25 RX ADMIN — SODIUM CHLORIDE 30 MILLILITER(S): 9 INJECTION INTRAMUSCULAR; INTRAVENOUS; SUBCUTANEOUS at 01:29

## 2023-01-25 RX ADMIN — PIPERACILLIN AND TAZOBACTAM 25 GRAM(S): 4; .5 INJECTION, POWDER, LYOPHILIZED, FOR SOLUTION INTRAVENOUS at 13:23

## 2023-01-25 NOTE — PROGRESS NOTE ADULT - ASSESSMENT
Patient is a 46 y/o male PMH DM2, Bilateral BKA, ESRD (on HD MWF), last dialyzed yesterday transferred from [facility] emergently for evaluation of evaluation of right finger swelling/pain. Patient reports history of right finger pain after he had a fall one year ago. Patient had a wound on her second/middle finger who he was seeing a hand surgeon for outpatient but unable ultimately to continue seeing due to insurance issues. Patient reports his middle finger developed increased swelling and became black in color about two weeks ago. Denies fevers. Patient transferred to [facility] for further evaluation and emergent OR. Patient received broad spectrum Abx of Zosyn/vanco/clindamycin at [place]. Interpretor phone used for translation with patient. ID# [id number]         septic workup and ID evaluation,send blood and urine cx,serial lactate levels,monitor vitals closley,ivfs hydration,monitor urine output and renal profile,iv abx as per id cons  piperacillin/tazobactam IVPB.. 3.375 Gram(s) IV Intermittent every 12 hours    esrd on hd   Excess fluids and waste products will be removed from your blood; your electrolytes will be balanced; your blood pressure will be controlled.    BP monitoring,continue current antihypertensive meds, low salt diet,followup with PMD in 1-2 weeks      ANEMIA PLAN:  Anemia of chronic disease:  Well controlled by epo  H and H subtherapeutic .  We will continue epo aiming for a HCT of 32-36 %.   We will monitor Iron stores, B12 and RBC folate .  epoetin deidre-epbx (RETACRIT) Injectable 28954 Unit(s) IV Push     Accuchecks monitoring and insulin sliding scale coverage, no concentrated sweets diet, serial labs and f/up with PMD, monitor HB A 1 C every 3-4 mnth  piperacillin/tazobactam IVPB.. 3.375 Gram(s) IV Intermittent every 12 hours

## 2023-01-25 NOTE — PROGRESS NOTE ADULT - SUBJECTIVE AND OBJECTIVE BOX
ORTHOPAEDICS DAILY PROGRESS NOTE:     SUBJECTIVE/ROS: Seen and examined. Pain controlled.     OBJECTIVE:  Vital Signs Last 24 Hrs  T(C): 36.6 (25 Jan 2023 05:54), Max: 36.6 (24 Jan 2023 18:18)  T(F): 97.9 (25 Jan 2023 05:54), Max: 97.9 (24 Jan 2023 21:02)  HR: 65 (25 Jan 2023 05:54) (65 - 73)  BP: 144/64 (25 Jan 2023 05:54) (102/52 - 144/64)  RR: 17 (25 Jan 2023 05:54) (17 - 18)  SpO2: 100% (25 Jan 2023 05:54) (95% - 100%)  Parameters below as of 25 Jan 2023 05:54  Patient On (Oxygen Delivery Method): room air      LABS:                                   8.7    9.44  )-----------( 585      ( 25 Jan 2023 01:42 )             28.1   01-25    135  |  97<L>  |  37<H>  ----------------------------<  176<H>  4.9   |  22  |  7.98<H>    Ca    8.2<L>      25 Jan 2023 01:42  Phos  6.9     01-25  Mg     2.10     01-25      PHYSICAL EXAM:  Resp: Nonlabored  R UE:  Nylon sutures intact on dorsal and volar aspect of forearm/wrist/hand  Exposed tendons on volar aspect of hand  3rd ray stump clean with bloody drainage, no signs of infection  Oozing from index finger  Moving fingers  Tenderness distal tips of fingers, SILT  Rest of hand WWP  No pain proximally

## 2023-01-25 NOTE — PROGRESS NOTE ADULT - ASSESSMENT
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 Repeat I+D on 1/14, 1/16, 1/19  -Pain control/analgesia  -DVT ppx: holding SQH for OR today  -FU AM Labs  -Non-weight bearing right upper extremity in bulky dressing  -Daily dressing and packing changes QD  -FU nephrology and HD (MWF)  -FU ID recs (zosyn)  -Appreciate Vas surgery recs  -Appreciate ophthalmology recs: outpatient follow-up

## 2023-01-25 NOTE — PROGRESS NOTE ADULT - SUBJECTIVE AND OBJECTIVE BOX
Yamilet Reyna MD  [facility] Division of Hospital Medicine  Pager [phone] (M-F 8AM-5PM)  Other Times: [phone]    Patient is a 47y old  Male who presents with a chief complaint of Steal syndrome (21 Jan 2023 00:53)    SUBJECTIVE / OVERNIGHT EVENTS: no acute events overnight    MEDICATIONS  (STANDING):  acetaminophen     Tablet .. 975 milliGRAM(s) Oral every 8 hours  aspirin  chewable 81 milliGRAM(s) Oral daily  chlorhexidine 2% Cloths 1 Application(s) Topical <User Schedule>  dorzolamide 2%/timolol 0.5% Ophthalmic Solution 1 Drop(s) Left EYE two times a day  epoetin deidre-epbx (RETACRIT) Injectable 17246 Unit(s) IV Push <User Schedule>  insulin glargine Injectable (LANTUS) 14 Unit(s) SubCutaneous at bedtime  insulin lispro (ADMELOG) corrective regimen sliding scale   SubCutaneous three times a day before meals  insulin lispro (ADMELOG) corrective regimen sliding scale   SubCutaneous at bedtime  insulin lispro Injectable (ADMELOG) 4 Unit(s) SubCutaneous three times a day before meals  midodrine. 10 milliGRAM(s) Oral three times a day  multivitamin 1 Tablet(s) Oral daily  piperacillin/tazobactam IVPB.. 3.375 Gram(s) IV Intermittent every 12 hours  sodium chloride 0.9%. 1000 milliLiter(s) (30 mL/Hr) IV Continuous <Continuous>    MEDICATIONS  (PRN):  bisacodyl Suppository 10 milliGRAM(s) Rectal daily PRN If no bowel movement  HYDROmorphone   Tablet 2 milliGRAM(s) Oral every 4 hours PRN Mild Pain (1 - 3)  HYDROmorphone   Tablet 4 milliGRAM(s) Oral every 4 hours PRN Moderate Pain (4 - 6)  HYDROmorphone   Tablet 8 milliGRAM(s) Oral every 4 hours PRN Severe Pain (7 - 10)  lactulose Syrup 10 Gram(s) Oral daily PRN Constipation      PHYSICAL EXAM:  Vital Signs Last 24 Hrs  T(C): 36.5 (25 Jan 2023 10:07), Max: 36.6 (24 Jan 2023 18:18)  T(F): 97.7 (25 Jan 2023 10:07), Max: 97.9 (24 Jan 2023 21:02)  HR: 64 (25 Jan 2023 10:07) (60 - 73)  BP: 145/72 (25 Jan 2023 10:07) (102/52 - 145/72)  RR: 16 (25 Jan 2023 10:07) (16 - 18)  SpO2: 97% (25 Jan 2023 10:07) (95% - 100%)    Parameters below as of 25 Jan 2023 10:07  Patient On (Oxygen Delivery Method): room air    CONSTITUTIONAL: NAD, well-developed, well-groomed  RESPIRATORY: Normal respiratory effort; lungs are clear to auscultation bilaterally  CARDIOVASCULAR: Regular rate and rhythm, normal S1 and S2, no murmur/rub/gallop; No lower extremity edema  GASTROINTESTINAL: Nontender to palpation, normoactive bowel sounds, no rebound/guarding; No hepatosplenomegaly  MUSCULOSKELETAL:  no clubbing or cyanosis of digits; no joint swelling or tenderness to palpation  NEUROLOGY: non-focal; no gross sensory deficits   PSYCH: A+O to person, place, and time; affect appropriate  SKIN: No rashes; warm     LABS:                        8.7    9.44  )-----------( 585      ( 25 Jan 2023 01:42 )             28.1     01-25    135  |  97<L>  |  37<H>  ----------------------------<  176<H>  4.9   |  22  |  7.98<H>    Ca    8.2<L>      25 Jan 2023 01:42  Phos  6.9     01-25  Mg     2.10     01-25      PT/INR - ( 25 Jan 2023 01:42 )   PT: 12.6 sec;   INR: 1.09 ratio         PTT - ( 25 Jan 2023 01:42 )  PTT:30.2 sec            RADIOLOGY & ADDITIONAL TESTS:  Results Reviewed:   Imaging Personally Reviewed:  Electrocardiogram Personally Reviewed:    COORDINATION OF CARE:  Care Discussed with Consultants/Other Providers [Y/N]:  Prior or Outpatient Records Reviewed [Y/N]:

## 2023-01-25 NOTE — PROGRESS NOTE ADULT - ASSESSMENT
A/P: 47y y/o Male s/p interval right hand/forearm I&D and partial closure with nylons, POD #0   -Pain control/analgesia  -DVT ppx - Venodynes/Aspirin 81mg  -FU AM Labs  -Non-weight bearing right upper extremity in bulky dressing  -Notify orthopedics with any questions

## 2023-01-25 NOTE — PROGRESS NOTE ADULT - PROBLEM SELECTOR PLAN 1
right 3rd digit gangrene d/t steal syndrome iso of RUE AVF  - s/p RUE fistula repair with vascular surgery on 1/13   - s/p right 3rd finger amputation and repeated I&D with ortho (1/14 + 1/16 + 1/19 + 1/25)  - ID following: wound cultures grew polymicrobial E coli, Strep, Bacteroides, complete zosyn 1/24  - management per ortho + vascular surgery   - pain control + bowel regimen

## 2023-01-25 NOTE — PROGRESS NOTE ADULT - SUBJECTIVE AND OBJECTIVE BOX
Patient is a 47y Male whom presented to the hospital with esrd on hd     PAST MEDICAL & SURGICAL HISTORY:      MEDICATIONS  (STANDING):  dextrose 5%. 1000 milliLiter(s) (100 mL/Hr) IV Continuous <Continuous>  dextrose 5%. 1000 milliLiter(s) (50 mL/Hr) IV Continuous <Continuous>  dextrose 50% Injectable 25 Gram(s) IV Push once  dextrose 50% Injectable 25 Gram(s) IV Push once  dextrose 50% Injectable 12.5 Gram(s) IV Push once  glucagon  Injectable 1 milliGRAM(s) IntraMuscular once  insulin lispro (ADMELOG) corrective regimen sliding scale   SubCutaneous every 6 hours  pantoprazole    Tablet 40 milliGRAM(s) Oral daily  polyethylene glycol 3350 17 Gram(s) Oral daily  senna 2 Tablet(s) Oral at bedtime      Allergies    No Known Allergies    Intolerances        SOCIAL HISTORY:  Denies ETOh,Smoking,     FAMILY HISTORY:      REVIEW OF SYSTEMS:    CONSTITUTIONAL: No weakness, fevers or chills  NECK: No pain or stiffness  RESPIRATORY: No cough, wheezing, hemoptysis; No shortness of breath  CARDIOVASCULAR: No chest pain or palpitations  GASTROINTESTINAL: No abdominal or epigastric pain. No nausea, vomiting,                                                                                                              8.7    9.44  )-----------( 585      ( 25 Jan 2023 01:42 )             28.1       CBC Full  -  ( 25 Jan 2023 01:42 )  WBC Count : 9.44 K/uL  RBC Count : 2.95 M/uL  Hemoglobin : 8.7 g/dL  Hematocrit : 28.1 %  Platelet Count - Automated : 585 K/uL  Mean Cell Volume : 95.3 fL  Mean Cell Hemoglobin : 29.5 pg  Mean Cell Hemoglobin Concentration : 31.0 gm/dL  Auto Neutrophil # : x  Auto Lymphocyte # : x  Auto Monocyte # : x  Auto Eosinophil # : x  Auto Basophil # : x  Auto Neutrophil % : x  Auto Lymphocyte % : x  Auto Monocyte % : x  Auto Eosinophil % : x  Auto Basophil % : x      01-25    135  |  97<L>  |  37<H>  ----------------------------<  176<H>  4.9   |  22  |  7.98<H>    Ca    8.2<L>      25 Jan 2023 01:42  Phos  6.9     01-25  Mg     2.10     01-25        CAPILLARY BLOOD GLUCOSE      POCT Blood Glucose.: 214 mg/dL (25 Jan 2023 16:25)  POCT Blood Glucose.: 151 mg/dL (25 Jan 2023 13:47)  POCT Blood Glucose.: 116 mg/dL (25 Jan 2023 12:33)  POCT Blood Glucose.: 104 mg/dL (25 Jan 2023 09:43)  POCT Blood Glucose.: 177 mg/dL (25 Jan 2023 06:16)  POCT Blood Glucose.: 139 mg/dL (24 Jan 2023 22:19)      Vital Signs Last 24 Hrs  T(C): 36.7 (25 Jan 2023 16:28), Max: 36.7 (25 Jan 2023 16:28)  T(F): 98 (25 Jan 2023 16:28), Max: 98 (25 Jan 2023 16:28)  HR: 81 (25 Jan 2023 16:28) (60 - 81)  BP: 146/75 (25 Jan 2023 16:28) (102/52 - 154/75)  BP(mean): 84 (25 Jan 2023 14:00) (84 - 96)  RR: 17 (25 Jan 2023 16:28) (15 - 20)  SpO2: 100% (25 Jan 2023 14:00) (95% - 100%)    Parameters below as of 25 Jan 2023 16:28  Patient On (Oxygen Delivery Method): room air            PT/INR - ( 25 Jan 2023 01:42 )   PT: 12.6 sec;   INR: 1.09 ratio         PTT - ( 25 Jan 2023 01:42 )  PTT:30.2 sec    PHYSICAL EXAM:    Constitutional: NAD  HEENT: conjunctive   clear   Neck:  No JVD  Respiratory: CTAB  Cardiovascular: S1 and S2  Gastrointestinal: BS+, soft, NT/ND   right hand dressed. bilateral BKA   right arm AVF nontender

## 2023-01-25 NOTE — PROGRESS NOTE ADULT - SUBJECTIVE AND OBJECTIVE BOX
Orthopedics Post-Op Check  Patient was seen and examined at bedside. Denies CP/SOB/N/V/HA. Resting comfortably without complaints s/p interval right hand/forearm I&D and partial closure with nylons today. Pain is controlled.    Vital Signs Last 24 Hrs  T(C): 36.6 (25 Jan 2023 14:00), Max: 36.6 (24 Jan 2023 18:18)  T(F): 97.9 (25 Jan 2023 14:00), Max: 97.9 (24 Jan 2023 21:02)  HR: 74 (25 Jan 2023 14:00) (60 - 75)  BP: 137/65 (25 Jan 2023 14:00) (102/52 - 154/75)  BP(mean): 84 (25 Jan 2023 14:00) (84 - 96)  RR: 15 (25 Jan 2023 14:00) (15 - 20)  SpO2: 100% (25 Jan 2023 14:00) (95% - 100%)    Parameters below as of 25 Jan 2023 13:00  Patient On (Oxygen Delivery Method): room air    Labs:                        8.7    9.44  )-----------( 585      ( 25 Jan 2023 01:42 )             28.1     01-25    135  |  97<L>  |  37<H>  ----------------------------<  176<H>  4.9   |  22  |  7.98<H>    Ca    8.2<L>      25 Jan 2023 01:42  Phos  6.9     01-25  Mg     2.10     01-25    Physical Exam:  R UE:    Dressing clean/dry/intact. ACE wrap in place. Thumb packing in place - noted blood tinged on packing.   Sensation intact to light touch.  Extremity warm

## 2023-01-25 NOTE — PRE-OP CHECKLIST - SELECT TESTS ORDERED
177/BMP/CBC/PT/PTT/INR/Type and Screen/POCT Blood Glucose 177 repeat [id number] at9:43am/BMP/CBC/PT/PTT/INR/Type and Screen/POCT Blood Glucose

## 2023-01-25 NOTE — PROGRESS NOTE ADULT - ASSESSMENT
47M with hx of ESRD on HD, PVD s/p bilateral BKA, T2DM who initially presented to [facility] with right finger swelling and pain, found to have steal syndrome transferred to [facility] for further management. s/p revision of AVF with vascular surgery 1/13, and s/p right 3rd finger amputation and repeated I&D with ortho (1/14 + 1/16 + 1/19 +1/25). Briefly required SICU stay for hemorrhagic show, now on ortho floors for further management.

## 2023-01-25 NOTE — PROGRESS NOTE ADULT - NUTRITIONAL ASSESSMENT
MEDICATIONS  (STANDING):  acetaminophen     Tablet .. 975 milliGRAM(s) Oral every 8 hours  aspirin  chewable 81 milliGRAM(s) Oral daily  chlorhexidine 2% Cloths 1 Application(s) Topical <User Schedule>  dorzolamide 2%/timolol 0.5% Ophthalmic Solution 1 Drop(s) Left EYE two times a day  epoetin deidre-epbx (RETACRIT) Injectable 15117 Unit(s) IV Push <User Schedule>  insulin glargine Injectable (LANTUS) 14 Unit(s) SubCutaneous at bedtime  insulin lispro (ADMELOG) corrective regimen sliding scale   SubCutaneous three times a day before meals  insulin lispro (ADMELOG) corrective regimen sliding scale   SubCutaneous at bedtime  insulin lispro Injectable (ADMELOG) 4 Unit(s) SubCutaneous three times a day before meals  midodrine. 10 milliGRAM(s) Oral three times a day  multivitamin 1 Tablet(s) Oral daily  piperacillin/tazobactam IVPB.. 3.375 Gram(s) IV Intermittent every 12 hours

## 2023-01-25 NOTE — PROGRESS NOTE ADULT - PROBLEM SELECTOR PLAN 2
A1C 7.1%  - home regimen: Basaglar 5U qhs + Humalog 4U TID qac  - Lantus 14U qhs + Admelog 4U TID qac  - if NPO: decrease Lantus by 50% (7U at bedtime) and hold premeal Admelog, SSI [id number]  - CC + Renal restricted diet

## 2023-01-26 LAB
ANION GAP SERPL CALC-SCNC: 16 MMOL/L — HIGH (ref 7–14)
BUN SERPL-MCNC: 30 MG/DL — HIGH (ref 7–23)
CALCIUM SERPL-MCNC: 8.7 MG/DL — SIGNIFICANT CHANGE UP (ref 8.4–10.5)
CHLORIDE SERPL-SCNC: 97 MMOL/L — LOW (ref 98–107)
CO2 SERPL-SCNC: 25 MMOL/L — SIGNIFICANT CHANGE UP (ref 22–31)
CREAT SERPL-MCNC: 6.54 MG/DL — HIGH (ref 0.5–1.3)
EGFR: 10 ML/MIN/1.73M2 — LOW
GLUCOSE BLDC GLUCOMTR-MCNC: 117 MG/DL — HIGH (ref 70–99)
GLUCOSE BLDC GLUCOMTR-MCNC: 171 MG/DL — HIGH (ref 70–99)
GLUCOSE BLDC GLUCOMTR-MCNC: 193 MG/DL — HIGH (ref 70–99)
GLUCOSE BLDC GLUCOMTR-MCNC: 78 MG/DL — SIGNIFICANT CHANGE UP (ref 70–99)
GLUCOSE BLDC GLUCOMTR-MCNC: 83 MG/DL — SIGNIFICANT CHANGE UP (ref 70–99)
GLUCOSE SERPL-MCNC: 78 MG/DL — SIGNIFICANT CHANGE UP (ref 70–99)
HCT VFR BLD CALC: 27 % — LOW (ref 39–50)
HGB BLD-MCNC: 8.4 G/DL — LOW (ref 13–17)
MAGNESIUM SERPL-MCNC: 2.2 MG/DL — SIGNIFICANT CHANGE UP (ref 1.6–2.6)
MCHC RBC-ENTMCNC: 29.6 PG — SIGNIFICANT CHANGE UP (ref 27–34)
MCHC RBC-ENTMCNC: 31.1 GM/DL — LOW (ref 32–36)
MCV RBC AUTO: 95.1 FL — SIGNIFICANT CHANGE UP (ref 80–100)
NRBC # BLD: 0 /100 WBCS — SIGNIFICANT CHANGE UP (ref 0–0)
NRBC # FLD: 0 K/UL — SIGNIFICANT CHANGE UP (ref 0–0)
PHOSPHATE SERPL-MCNC: 5.4 MG/DL — HIGH (ref 2.5–4.5)
PLATELET # BLD AUTO: 592 K/UL — HIGH (ref 150–400)
POTASSIUM SERPL-MCNC: 3.6 MMOL/L — SIGNIFICANT CHANGE UP (ref 3.5–5.3)
POTASSIUM SERPL-SCNC: 3.6 MMOL/L — SIGNIFICANT CHANGE UP (ref 3.5–5.3)
RBC # BLD: 2.84 M/UL — LOW (ref 4.2–5.8)
RBC # FLD: 17.5 % — HIGH (ref 10.3–14.5)
SODIUM SERPL-SCNC: 138 MMOL/L — SIGNIFICANT CHANGE UP (ref 135–145)
WBC # BLD: 10.51 K/UL — HIGH (ref 3.8–10.5)
WBC # FLD AUTO: 10.51 K/UL — HIGH (ref 3.8–10.5)

## 2023-01-26 PROCEDURE — 99233 SBSQ HOSP IP/OBS HIGH 50: CPT

## 2023-01-26 RX ORDER — HYDROMORPHONE HYDROCHLORIDE 2 MG/ML
4 INJECTION INTRAMUSCULAR; INTRAVENOUS; SUBCUTANEOUS EVERY 4 HOURS
Refills: 0 | Status: DISCONTINUED | OUTPATIENT
Start: 2023-01-26 | End: 2023-02-02

## 2023-01-26 RX ORDER — HYDROMORPHONE HYDROCHLORIDE 2 MG/ML
2 INJECTION INTRAMUSCULAR; INTRAVENOUS; SUBCUTANEOUS EVERY 4 HOURS
Refills: 0 | Status: DISCONTINUED | OUTPATIENT
Start: 2023-01-26 | End: 2023-01-26

## 2023-01-26 RX ORDER — HYDROMORPHONE HYDROCHLORIDE 2 MG/ML
8 INJECTION INTRAMUSCULAR; INTRAVENOUS; SUBCUTANEOUS EVERY 4 HOURS
Refills: 0 | Status: DISCONTINUED | OUTPATIENT
Start: 2023-01-26 | End: 2023-01-26

## 2023-01-26 RX ADMIN — Medication 1: at 07:48

## 2023-01-26 RX ADMIN — DORZOLAMIDE HYDROCHLORIDE TIMOLOL MALEATE 1 DROP(S): 20; 5 SOLUTION/ DROPS OPHTHALMIC at 05:45

## 2023-01-26 RX ADMIN — PIPERACILLIN AND TAZOBACTAM 25 GRAM(S): 4; .5 INJECTION, POWDER, LYOPHILIZED, FOR SOLUTION INTRAVENOUS at 13:48

## 2023-01-26 RX ADMIN — DORZOLAMIDE HYDROCHLORIDE TIMOLOL MALEATE 1 DROP(S): 20; 5 SOLUTION/ DROPS OPHTHALMIC at 18:35

## 2023-01-26 RX ADMIN — Medication 975 MILLIGRAM(S): at 21:11

## 2023-01-26 RX ADMIN — INSULIN GLARGINE 14 UNIT(S): 100 INJECTION, SOLUTION SUBCUTANEOUS at 23:07

## 2023-01-26 RX ADMIN — Medication 975 MILLIGRAM(S): at 13:48

## 2023-01-26 RX ADMIN — Medication 975 MILLIGRAM(S): at 05:45

## 2023-01-26 RX ADMIN — Medication 4 UNIT(S): at 13:47

## 2023-01-26 RX ADMIN — Medication 4 UNIT(S): at 17:04

## 2023-01-26 RX ADMIN — Medication 81 MILLIGRAM(S): at 13:47

## 2023-01-26 RX ADMIN — Medication 1 TABLET(S): at 13:47

## 2023-01-26 RX ADMIN — Medication 4 UNIT(S): at 07:47

## 2023-01-26 RX ADMIN — CHLORHEXIDINE GLUCONATE 1 APPLICATION(S): 213 SOLUTION TOPICAL at 13:48

## 2023-01-26 RX ADMIN — PIPERACILLIN AND TAZOBACTAM 25 GRAM(S): 4; .5 INJECTION, POWDER, LYOPHILIZED, FOR SOLUTION INTRAVENOUS at 01:56

## 2023-01-26 NOTE — PROGRESS NOTE ADULT - PROBLEM SELECTOR PLAN 2
A1C 7.1%  - home regimen: Basaglar 5U qhs + Humalog 4U TID qac  - Lantus 14U qhs + Admelog 4U TID qac  - if NPO: decrease Lantus by 50% (7U at bedtime) and hold premeal Admelog, SSI [id number]  - CC + Renal restricted diet

## 2023-01-26 NOTE — PROGRESS NOTE ADULT - ASSESSMENT
47M with hx of ESRD on HD, PVD s/p bilateral BKA, T2DM who initially presented to [facility] with right finger swelling and pain, found to have steal syndrome transferred to [facility] for further management. s/p revision of AVF with vascular surgery 1/13, and s/p right 3rd finger amputation and repeated I&D with ortho (1/14 + 1/16 + 1/19 +1/25). Briefly required SICU stay for hemorrhagic show, now on ortho floors for further management.

## 2023-01-26 NOTE — PROGRESS NOTE ADULT - SUBJECTIVE AND OBJECTIVE BOX
SURGERY  Pager: #[phone]    INTERVAL EVENTS/SUBJECTIVE: No acute events overnight. Patient seen and examined. Aquacel removed at bedside.     ______________________________________________  OBJECTIVE:   T(C): 36.9 (01-26-23 @ 05:43), Max: 37.2 (01-25-23 @ 22:31)  HR: 75 (01-26-23 @ 05:43) (60 - 84)  BP: 128/66 (01-26-23 @ 05:43) (112/61 - 154/75)  RR: 16 (01-26-23 @ 05:43) (15 - 20)  SpO2: 100% (01-26-23 @ 05:43) (97% - 100%)  Wt(kg): --  CAPILLARY BLOOD GLUCOSE      POCT Blood Glucose.: 223 mg/dL (25 Jan 2023 22:29)  POCT Blood Glucose.: 214 mg/dL (25 Jan 2023 16:25)  POCT Blood Glucose.: 151 mg/dL (25 Jan 2023 13:47)  POCT Blood Glucose.: 116 mg/dL (25 Jan 2023 12:33)  POCT Blood Glucose.: 104 mg/dL (25 Jan 2023 09:43)    I&O's Detail    25 Jan 2023 07:01  -  26 Jan 2023 07:00  --------------------------------------------------------  IN:    Other (mL): 400 mL  Total IN: 400 mL    OUT:    Other (mL): 2200 mL  Total OUT: 2200 mL    Total NET: -1800 mL          Physical exam:   General: Appears well, NAD. Non toxic appearing, sitting up in bed   Abdomen: soft, nontender, nondistended, no rebound or guarding  Extremities: RUE +thrill over fistula. RLE thigh incision c/d/i, staples intact   ______________________________________________  LABS:  CBC Full  -  ( 25 Jan 2023 01:42 )  WBC Count : 9.44 K/uL  RBC Count : 2.95 M/uL  Hemoglobin : 8.7 g/dL  Hematocrit : 28.1 %  Platelet Count - Automated : 585 K/uL  Mean Cell Volume : 95.3 fL  Mean Cell Hemoglobin : 29.5 pg  Mean Cell Hemoglobin Concentration : 31.0 gm/dL  Auto Neutrophil # : x  Auto Lymphocyte # : x  Auto Monocyte # : x  Auto Eosinophil # : x  Auto Basophil # : x  Auto Neutrophil % : x  Auto Lymphocyte % : x  Auto Monocyte % : x  Auto Eosinophil % : x  Auto Basophil % : x    01-25    135  |  97<L>  |  37<H>  ----------------------------<  176<H>  4.9   |  22  |  7.98<H>    Ca    8.2<L>      25 Jan 2023 01:42  Phos  6.9     01-25  Mg     2.10     01-25      _____________________________________________  RADIOLOGY:

## 2023-01-26 NOTE — PROGRESS NOTE ADULT - SUBJECTIVE AND OBJECTIVE BOX
Patient is a 47y Male whom presented to the hospital with esrd on hd     PAST MEDICAL & SURGICAL HISTORY:      MEDICATIONS  (STANDING):  dextrose 5%. 1000 milliLiter(s) (100 mL/Hr) IV Continuous <Continuous>  dextrose 5%. 1000 milliLiter(s) (50 mL/Hr) IV Continuous <Continuous>  dextrose 50% Injectable 25 Gram(s) IV Push once  dextrose 50% Injectable 25 Gram(s) IV Push once  dextrose 50% Injectable 12.5 Gram(s) IV Push once  glucagon  Injectable 1 milliGRAM(s) IntraMuscular once  insulin lispro (ADMELOG) corrective regimen sliding scale   SubCutaneous every 6 hours  pantoprazole    Tablet 40 milliGRAM(s) Oral daily  polyethylene glycol 3350 17 Gram(s) Oral daily  senna 2 Tablet(s) Oral at bedtime      Allergies    No Known Allergies    Intolerances        SOCIAL HISTORY:  Denies ETOh,Smoking,     FAMILY HISTORY:      REVIEW OF SYSTEMS:    CONSTITUTIONAL: No weakness, fevers or chills  NECK: No pain or stiffness  RESPIRATORY: No cough, wheezing, hemoptysis; No shortness of breath  CARDIOVASCULAR: No chest pain or palpitations  GASTROINTESTINAL: No abdominal or epigastric pain. No nausea, vomiting,                                                                          8.4    10.51 )-----------( 592      ( 26 Jan 2023 09:37 )             27.0       CBC Full  -  ( 26 Jan 2023 09:37 )  WBC Count : 10.51 K/uL  RBC Count : 2.84 M/uL  Hemoglobin : 8.4 g/dL  Hematocrit : 27.0 %  Platelet Count - Automated : 592 K/uL  Mean Cell Volume : 95.1 fL  Mean Cell Hemoglobin : 29.6 pg  Mean Cell Hemoglobin Concentration : 31.1 gm/dL  Auto Neutrophil # : x  Auto Lymphocyte # : x  Auto Monocyte # : x  Auto Eosinophil # : x  Auto Basophil # : x  Auto Neutrophil % : x  Auto Lymphocyte % : x  Auto Monocyte % : x  Auto Eosinophil % : x  Auto Basophil % : x      01-26    138  |  97<L>  |  30<H>  ----------------------------<  78  3.6   |  25  |  6.54<H>    Ca    8.7      26 Jan 2023 09:37  Phos  5.4     01-26  Mg     2.20     01-26        CAPILLARY BLOOD GLUCOSE      POCT Blood Glucose.: 83 mg/dL (26 Jan 2023 16:18)  POCT Blood Glucose.: 117 mg/dL (26 Jan 2023 13:09)  POCT Blood Glucose.: 78 mg/dL (26 Jan 2023 11:07)  POCT Blood Glucose.: 171 mg/dL (26 Jan 2023 07:30)  POCT Blood Glucose.: 223 mg/dL (25 Jan 2023 22:29)      Vital Signs Last 24 Hrs  T(C): 36.7 (26 Jan 2023 18:00), Max: 37.2 (25 Jan 2023 22:31)  T(F): 98 (26 Jan 2023 18:00), Max: 98.9 (25 Jan 2023 22:31)  HR: 73 (26 Jan 2023 18:00) (64 - 84)  BP: 140/69 (26 Jan 2023 18:00) (120/52 - 140/69)  BP(mean): --  RR: 17 (26 Jan 2023 18:00) (16 - 17)  SpO2: 99% (26 Jan 2023 18:00) (98% - 100%)    Parameters below as of 26 Jan 2023 18:00  Patient On (Oxygen Delivery Method): room air            PT/INR - ( 25 Jan 2023 01:42 )   PT: 12.6 sec;   INR: 1.09 ratio         PTT - ( 25 Jan 2023 01:42 )  PTT:30.2 sec  PHYSICAL EXAM:    Constitutional: NAD  HEENT: conjunctive   clear   Neck:  No JVD  Respiratory: CTAB  Cardiovascular: S1 and S2  Gastrointestinal: BS+, soft, NT/ND   right hand dressed. bilateral BKA   right arm AVF nontender

## 2023-01-26 NOTE — CHART NOTE - NSCHARTNOTEFT_GEN_A_CORE
Reason for Follow-Up Assessment: Length of Stay     Source: [ ] Patient [ ] Family [ ] RN [ X ] Chart     Diet, Consistent Carbohydrate Renal w/Evening Snack (01-24-23 @ 11:38)      GI: WDL. Last BM noted on [ 1/22 ]    PO intake:  [ ] Poor < 50%  [X] Fair 50-75% [X] Good  % [ ] Inconsistent PO intake    Enteral /Parenteral Nutrition:       [ X ] n/a    Anthropometrics: Height (cm): 170.2 (01-25), 170.2 (01-09)  Weight (kg): 65.8 (01-25), 65.8 (01-09)  BMI (kg/m2): 22.7 (01-25), 22.7 (01-09)    Edema:    Pressure Injuries:    _______________ Pertinent Medications_______________  MEDICATIONS  (STANDING):  acetaminophen     Tablet .. 975 milliGRAM(s) Oral every 8 hours  aspirin  chewable 81 milliGRAM(s) Oral daily  chlorhexidine 2% Cloths 1 Application(s) Topical <User Schedule>  dorzolamide 2%/timolol 0.5% Ophthalmic Solution 1 Drop(s) Left EYE two times a day  epoetin deidre-epbx (RETACRIT) Injectable 56885 Unit(s) IV Push <User Schedule>  insulin glargine Injectable (LANTUS) 14 Unit(s) SubCutaneous at bedtime  insulin lispro (ADMELOG) corrective regimen sliding scale   SubCutaneous three times a day before meals  insulin lispro (ADMELOG) corrective regimen sliding scale   SubCutaneous at bedtime  insulin lispro Injectable (ADMELOG) 4 Unit(s) SubCutaneous three times a day before meals  midodrine. 10 milliGRAM(s) Oral three times a day  multivitamin 1 Tablet(s) Oral daily  piperacillin/tazobactam IVPB.. 3.375 Gram(s) IV Intermittent every 12 hours    MEDICATIONS  (PRN):  bisacodyl Suppository 10 milliGRAM(s) Rectal daily PRN If no bowel movement  HYDROmorphone   Tablet 2 milliGRAM(s) Oral every 4 hours PRN Mild Pain (1 - 3)  HYDROmorphone   Tablet 4 milliGRAM(s) Oral every 4 hours PRN Moderate Pain (4 - 6)  HYDROmorphone   Tablet 8 milliGRAM(s) Oral every 4 hours PRN Severe Pain (7 - 10)  lactulose Syrup 10 Gram(s) Oral daily PRN Constipation      __________________ Pertinent Labs__________________   01-25 Na135 mmol/L Glu 176 mg/dL<H> K+ 4.9 mmol/L Cr  7.98 mg/dL<H> BUN 37 mg/dL<H> 01-25 Phos 6.9 mg/dL<H> 01-19 Alb 3.0 g/dL<L>        POCT Blood Glucose.: 171 mg/dL (01-26-23 @ 07:30)  POCT Blood Glucose.: 223 mg/dL (01-25-23 @ 22:29)  POCT Blood Glucose.: 214 mg/dL (01-25-23 @ 16:25)  POCT Blood Glucose.: 151 mg/dL (01-25-23 @ 13:47)  POCT Blood Glucose.: 116 mg/dL (01-25-23 @ 12:33)  POCT Blood Glucose.: 104 mg/dL (01-25-23 @ 09:43)  POCT Blood Glucose.: 177 mg/dL (01-25-23 @ 06:16)  POCT Blood Glucose.: 139 mg/dL (01-24-23 @ 22:19)  POCT Blood Glucose.: 229 mg/dL (01-24-23 @ 16:43)  POCT Blood Glucose.: 112 mg/dL (01-24-23 @ 11:23)  POCT Blood Glucose.: 221 mg/dL (01-24-23 @ 06:10)  POCT Blood Glucose.: 161 mg/dL (01-23-23 @ 21:27)  POCT Blood Glucose.: 121 mg/dL (01-23-23 @ 16:38)  POCT Blood Glucose.: 157 mg/dL (01-23-23 @ 11:18)      Estimated Needs:   andrew/d  gm pro/d    [ ] no change since previous assessment  [ ] recalculated:     Previous Nutrition Diagnosis:     Nutrition Diagnosis is [ ] ongoing  [ ] resolved [ ] not applicable     New Nutrition Diagnosis:    Monitoring and Evaluation:     [ x ] Tolerance to diet prescription / adequacy of meal intake  [ x ] Weight trends   [ x ] Pertinent labs  [ x ] Other:    Recommendations:  1) Diet / Supplement Changes:  2) Obtain and record current weights to best monitor for acute changes in nutritional status.  3) Please consistently document % meal intake in nursing flowsheets. Reason for Follow-Up Assessment: Length of Stay     47y old  Male w hx of ESRD on HD, PVD s/p bilateral BKA, T2DM who initially presented to [facility] with right finger swelling and pain, found to have steal syndrome transferred to [facility] for further management. s/p revision of AVF with vascular surgery 1/13, and s/p right 3rd finger amputation and repeated I&D with ortho (1/14 + 1/16 + 1/19 +1/25). Briefly required SICU stay for hemorrhagic show, now on ortho floors for further management.    Patient noted with fair to good PO intake as being recorded in nursing flowsheets.    Source: [ ] Patient [ ] Family [ ] RN [ X ] Chart     Diet, Consistent Carbohydrate Renal w/Evening Snack (01-24-23 @ 11:38)    GI: WDL. Last BM noted on [ 1/22 ]    PO intake:  [ ] Poor < 50%  [X] Fair 50-75% [X] Good  % [ ] Inconsistent PO intake    Enteral /Parenteral Nutrition:       [ X ] n/a    Weight Hx: 1/26 - 73.5kg      1/25 - 70.4kg      1/24 - 69.7kg       Adm/dosing 65.8kg      Adjusted IBW for BKAs 60.7kg    Edema: Pt. with b/l BKAs    Pressure Injuries: No pressure ulcers/DTI noted in flowsheets.     _______________ Pertinent Medications_______________  MEDICATIONS  (STANDING):  acetaminophen     Tablet .. 975 milliGRAM(s) Oral every 8 hours  aspirin  chewable 81 milliGRAM(s) Oral daily  chlorhexidine 2% Cloths 1 Application(s) Topical <User Schedule>  dorzolamide 2%/timolol 0.5% Ophthalmic Solution 1 Drop(s) Left EYE two times a day  epoetin deidre-epbx (RETACRIT) Injectable 81422 Unit(s) IV Push <User Schedule>  insulin glargine Injectable (LANTUS) 14 Unit(s) SubCutaneous at bedtime  insulin lispro (ADMELOG) corrective regimen sliding scale   SubCutaneous three times a day before meals  insulin lispro (ADMELOG) corrective regimen sliding scale   SubCutaneous at bedtime  insulin lispro Injectable (ADMELOG) 4 Unit(s) SubCutaneous three times a day before meals  midodrine. 10 milliGRAM(s) Oral three times a day  multivitamin 1 Tablet(s) Oral daily  piperacillin/tazobactam IVPB.. 3.375 Gram(s) IV Intermittent every 12 hours    MEDICATIONS  (PRN):  bisacodyl Suppository 10 milliGRAM(s) Rectal daily PRN If no bowel movement  HYDROmorphone   Tablet 2 milliGRAM(s) Oral every 4 hours PRN Mild Pain (1 - 3)  HYDROmorphone   Tablet 4 milliGRAM(s) Oral every 4 hours PRN Moderate Pain (4 - 6)  HYDROmorphone   Tablet 8 milliGRAM(s) Oral every 4 hours PRN Severe Pain (7 - 10)  lactulose Syrup 10 Gram(s) Oral daily PRN Constipation      __________________ Pertinent Labs__________________   01-25 Na135 mmol/L Glu 176 mg/dL<H> K+ 4.9 mmol/L Cr  7.98 mg/dL<H> BUN 37 mg/dL<H> 01-25 Phos 6.9 mg/dL<H> 01-19 Alb 3.0 g/dL<L>    POCT Blood Glucose.: 171 mg/dL (01-26-23 @ 07:30)  POCT Blood Glucose.: 223 mg/dL (01-25-23 @ 22:29)  POCT Blood Glucose.: 214 mg/dL (01-25-23 @ 16:25)  POCT Blood Glucose.: 151 mg/dL (01-25-23 @ 13:47)  POCT Blood Glucose.: 116 mg/dL (01-25-23 @ 12:33)  POCT Blood Glucose.: 104 mg/dL (01-25-23 @ 09:43)  POCT Blood Glucose.: 177 mg/dL (01-25-23 @ 06:16)  POCT Blood Glucose.: 139 mg/dL (01-24-23 @ 22:19)  POCT Blood Glucose.: 229 mg/dL (01-24-23 @ 16:43)  POCT Blood Glucose.: 112 mg/dL (01-24-23 @ 11:23)  POCT Blood Glucose.: 221 mg/dL (01-24-23 @ 06:10)  POCT Blood Glucose.: 161 mg/dL (01-23-23 @ 21:27)  POCT Blood Glucose.: 121 mg/dL (01-23-23 @ 16:38)  POCT Blood Glucose.: 157 mg/dL (01-23-23 @ 11:18)    Estimated Needs:   [ X ] no change since previous assessment  [ ] recalculated:     Previous Nutrition Diagnosis: Altered Nutrition Related Lab Values    Nutrition Diagnosis is [ X] ongoing  [ ] resolved [ ] not applicable     New Nutrition Diagnosis: n/a    Monitoring and Evaluation:     [ x ] Tolerance to diet prescription / adequacy of meal intake  [ x ] Weight trends   [ x ] Pertinent labs    Recommendations:  1) Diet / Supplement Changes: Continue therapeutic diet restrictions as ordered  2) Please consistently document % meal intake in nursing flowsheets.  3) Remain available for diet education as requested/needed.    Rubi Spence, MS, RDN, CDN  Pager [phone]  Also available on MS Teams Reason for Follow-Up Assessment: Length of Stay     47y old  Male w hx of ESRD on HD, PVD s/p bilateral BKA, T2DM who initially presented to [facility] with right finger swelling and pain, found to have steal syndrome transferred to [facility] for further management. s/p revision of AVF with vascular surgery 1/13, and s/p right 3rd finger amputation and repeated I&D with ortho (1/14 + 1/16 + 1/19 +1/25). Briefly required SICU stay for hemorrhagic show, now on ortho floors for further management.    Patient noted with fair to good PO intake as being recorded in nursing flowsheets. No chewing or swallowing difficulties reported. No GI distress reported i.e. nausea, vomiting, diarrhea reported at this time.  Reviewed current therapeutic diet with patient.  Stated he dislikes Nepro supplements, but understands increased protein needs due to HD - he reported he eats high biological value protein foods.    Source: [ X] Patient [ ] Family [ ] RN [ X ] Chart     Diet, Consistent Carbohydrate Renal w/Evening Snack (01-24-23 @ 11:38)    GI: WDL. Last BM noted on [ 1/22 ]    PO intake:  [ ] Poor < 50%  [X] Fair 50-75% [X] Good  % [ ] Inconsistent PO intake    Enteral /Parenteral Nutrition:       [ X ] n/a    Weight Hx: 1/26 - 73.5kg      1/25 - 70.4kg      1/24 - 69.7kg       Adm/dosing 65.8kg      Adjusted IBW for BKAs 60.7kg       Expect weight fluctuations with HD tx due to fluid shifts.    Edema: Pt. with b/l BKAs, no edema noted.    Pressure Injuries: No pressure ulcers/DTI noted in flowsheets.     _______________ Pertinent Medications_______________  MEDICATIONS  (STANDING):  acetaminophen     Tablet .. 975 milliGRAM(s) Oral every 8 hours  aspirin  chewable 81 milliGRAM(s) Oral daily  chlorhexidine 2% Cloths 1 Application(s) Topical <User Schedule>  dorzolamide 2%/timolol 0.5% Ophthalmic Solution 1 Drop(s) Left EYE two times a day  epoetin deidre-epbx (RETACRIT) Injectable 58723 Unit(s) IV Push <User Schedule>  insulin glargine Injectable (LANTUS) 14 Unit(s) SubCutaneous at bedtime  insulin lispro (ADMELOG) corrective regimen sliding scale   SubCutaneous three times a day before meals  insulin lispro (ADMELOG) corrective regimen sliding scale   SubCutaneous at bedtime  insulin lispro Injectable (ADMELOG) 4 Unit(s) SubCutaneous three times a day before meals  midodrine. 10 milliGRAM(s) Oral three times a day  multivitamin 1 Tablet(s) Oral daily  piperacillin/tazobactam IVPB.. 3.375 Gram(s) IV Intermittent every 12 hours    MEDICATIONS  (PRN):  bisacodyl Suppository 10 milliGRAM(s) Rectal daily PRN If no bowel movement  HYDROmorphone   Tablet 2 milliGRAM(s) Oral every 4 hours PRN Mild Pain (1 - 3)  HYDROmorphone   Tablet 4 milliGRAM(s) Oral every 4 hours PRN Moderate Pain (4 - 6)  HYDROmorphone   Tablet 8 milliGRAM(s) Oral every 4 hours PRN Severe Pain (7 - 10)  lactulose Syrup 10 Gram(s) Oral daily PRN Constipation      __________________ Pertinent Labs__________________   01-25 Na135 mmol/L Glu 176 mg/dL<H> K+ 4.9 mmol/L Cr  7.98 mg/dL<H> BUN 37 mg/dL<H> 01-25 Phos 6.9 mg/dL<H> 01-19 Alb 3.0 g/dL<L>    POCT Blood Glucose.: 171 mg/dL (01-26-23 @ 07:30)  POCT Blood Glucose.: 223 mg/dL (01-25-23 @ 22:29)  POCT Blood Glucose.: 214 mg/dL (01-25-23 @ 16:25)  POCT Blood Glucose.: 151 mg/dL (01-25-23 @ 13:47)  POCT Blood Glucose.: 116 mg/dL (01-25-23 @ 12:33)  POCT Blood Glucose.: 104 mg/dL (01-25-23 @ 09:43)  POCT Blood Glucose.: 177 mg/dL (01-25-23 @ 06:16)  POCT Blood Glucose.: 139 mg/dL (01-24-23 @ 22:19)  POCT Blood Glucose.: 229 mg/dL (01-24-23 @ 16:43)  POCT Blood Glucose.: 112 mg/dL (01-24-23 @ 11:23)  POCT Blood Glucose.: 221 mg/dL (01-24-23 @ 06:10)  POCT Blood Glucose.: 161 mg/dL (01-23-23 @ 21:27)  POCT Blood Glucose.: 121 mg/dL (01-23-23 @ 16:38)  POCT Blood Glucose.: 157 mg/dL (01-23-23 @ 11:18)    Estimated Needs:   [ X ] no change since previous assessment  [ ] recalculated:     Previous Nutrition Diagnosis: Altered Nutrition Related Lab Values    Nutrition Diagnosis is [ X] ongoing  [ ] resolved [ ] not applicable     New Nutrition Diagnosis: n/a    Monitoring and Evaluation:     [ x ] Tolerance to diet prescription / adequacy of meal intake  [ x ] Weight trends   [ x ] Pertinent labs    Recommendations:  1) Diet / Supplement Changes: Continue therapeutic diet restrictions as ordered  2) Please consistently document % meal intake in nursing flowsheets.  3) Remain available for diet education as needed/requested.    Rubi Spence, MS, RDN, CDN  Pager [phone]  Also available on MS Teams

## 2023-01-26 NOTE — PROGRESS NOTE ADULT - NUTRITIONAL ASSESSMENT
MEDICATIONS  (STANDING):  acetaminophen     Tablet .. 975 milliGRAM(s) Oral every 8 hours  aspirin  chewable 81 milliGRAM(s) Oral daily  chlorhexidine 2% Cloths 1 Application(s) Topical <User Schedule>  dorzolamide 2%/timolol 0.5% Ophthalmic Solution 1 Drop(s) Left EYE two times a day  epoetin deidre-epbx (RETACRIT) Injectable 85371 Unit(s) IV Push <User Schedule>  insulin glargine Injectable (LANTUS) 14 Unit(s) SubCutaneous at bedtime  insulin lispro (ADMELOG) corrective regimen sliding scale   SubCutaneous three times a day before meals  insulin lispro (ADMELOG) corrective regimen sliding scale   SubCutaneous at bedtime  insulin lispro Injectable (ADMELOG) 4 Unit(s) SubCutaneous three times a day before meals  midodrine. 10 milliGRAM(s) Oral three times a day  multivitamin 1 Tablet(s) Oral daily  piperacillin/tazobactam IVPB.. 3.375 Gram(s) IV Intermittent every 12 hours

## 2023-01-26 NOTE — PROGRESS NOTE ADULT - ASSESSMENT
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 Repeat I+D on 1/14, 1/16, 1/19, and 1/25  -Pain control/analgesia  -FU AM Labs  -Non-weight bearing right upper extremity in bulky dressing  -Daily dressing and packing changes QD  -FU nephrology and HD (MWF)  -FU ID recs (zosyn)  -Appreciate [facility] surgery recs  -Appreciate ophthalmology recs: outpatient follow-up

## 2023-01-26 NOTE — PROGRESS NOTE ADULT - SUBJECTIVE AND OBJECTIVE BOX
Yamilet Reyna MD  [facility] Division of Hospital Medicine  Pager [phone] (M-F 8AM-5PM)  Other Times: [phone]    Patient is a 47y old  Male who presents with a chief complaint of Steal syndrome (21 Jan 2023 00:53)    SUBJECTIVE / OVERNIGHT EVENTS: no acute events overnight    MEDICATIONS  (STANDING):  acetaminophen     Tablet .. 975 milliGRAM(s) Oral every 8 hours  aspirin  chewable 81 milliGRAM(s) Oral daily  chlorhexidine 2% Cloths 1 Application(s) Topical <User Schedule>  dorzolamide 2%/timolol 0.5% Ophthalmic Solution 1 Drop(s) Left EYE two times a day  epoetin deidre-epbx (RETACRIT) Injectable 08298 Unit(s) IV Push <User Schedule>  insulin glargine Injectable (LANTUS) 14 Unit(s) SubCutaneous at bedtime  insulin lispro (ADMELOG) corrective regimen sliding scale   SubCutaneous three times a day before meals  insulin lispro (ADMELOG) corrective regimen sliding scale   SubCutaneous at bedtime  insulin lispro Injectable (ADMELOG) 4 Unit(s) SubCutaneous three times a day before meals  midodrine. 10 milliGRAM(s) Oral three times a day  multivitamin 1 Tablet(s) Oral daily  piperacillin/tazobactam IVPB.. 3.375 Gram(s) IV Intermittent every 12 hours    MEDICATIONS  (PRN):  bisacodyl Suppository 10 milliGRAM(s) Rectal daily PRN If no bowel movement  HYDROmorphone   Tablet 2 milliGRAM(s) Oral every 4 hours PRN Mild Pain (1 - 3)  HYDROmorphone   Tablet 4 milliGRAM(s) Oral every 4 hours PRN Moderate Pain (4 - 6)  HYDROmorphone   Tablet 8 milliGRAM(s) Oral every 4 hours PRN Severe Pain (7 - 10)  lactulose Syrup 10 Gram(s) Oral daily PRN Constipation      PHYSICAL EXAM:  Vital Signs Last 24 Hrs  T(C): 36.7 (26 Jan 2023 09:15), Max: 37.2 (25 Jan 2023 22:31)  T(F): 98.1 (26 Jan 2023 09:15), Max: 98.9 (25 Jan 2023 22:31)  HR: 77 (26 Jan 2023 09:15) (71 - 84)  BP: 128/58 (26 Jan 2023 09:15) (120/52 - 154/75)  BP(mean): 84 (25 Jan 2023 14:00) (84 - 96)  RR: 17 (26 Jan 2023 09:15) (15 - 20)  SpO2: 99% (26 Jan 2023 09:15) (98% - 100%)    Parameters below as of 26 Jan 2023 09:15  Patient On (Oxygen Delivery Method): room air        CONSTITUTIONAL: NAD, well-developed, well-groomed  RESPIRATORY: Normal respiratory effort; lungs are clear to auscultation bilaterally  CARDIOVASCULAR: Regular rate and rhythm, normal S1 and S2, no murmur/rub/gallop; No lower extremity edema  GASTROINTESTINAL: Nontender to palpation, normoactive bowel sounds, no rebound/guarding; No hepatosplenomegaly  MUSCULOSKELETAL:  no clubbing or cyanosis of digits; no joint swelling or tenderness to palpation  NEUROLOGY: non-focal; no gross sensory deficits   PSYCH: A+O to person, place, and time; affect appropriate  SKIN: No rashes; warm, surgical site c/d/i    LABS:                        8.4    10.51 )-----------( 592      ( 26 Jan 2023 09:37 )             27.0     01-26    138  |  97<L>  |  30<H>  ----------------------------<  78  3.6   |  25  |  6.54<H>    Ca    8.7      26 Jan 2023 09:37  Phos  5.4     01-26  Mg     2.20     01-26      PT/INR - ( 25 Jan 2023 01:42 )   PT: 12.6 sec;   INR: 1.09 ratio         PTT - ( 25 Jan 2023 01:42 )  PTT:30.2 sec            RADIOLOGY & ADDITIONAL TESTS:  Results Reviewed:   Imaging Personally Reviewed:  Electrocardiogram Personally Reviewed:    COORDINATION OF CARE:  Care Discussed with Consultants/Other Providers [Y/N]:  Prior or Outpatient Records Reviewed [Y/N]:

## 2023-01-26 NOTE — PROGRESS NOTE ADULT - PROBLEM SELECTOR PLAN 1
right 3rd digit gangrene d/t steal syndrome iso of RUE AVF  - s/p RUE fistula repair with vascular surgery on 1/13   - s/p right 3rd finger amputation and repeated I&D with ortho (1/14 + 1/16 + 1/19 + 1/25)  - ID following: wound cultures grew polymicrobial E coli, Strep, Bacteroides, complete zosyn 1/24  - management per ortho + vascular surgery   - pain control: ATC Tylenol, Dilaudid 2mg q4h PRN mild pain, 4mg q4h PRN moderate pain, 8mg q4h PRN severe pain, bowel regimen while on opiates  - DVT ppx per primary team

## 2023-01-26 NOTE — PROGRESS NOTE ADULT - SUBJECTIVE AND OBJECTIVE BOX
ORTHOPAEDICS DAILY PROGRESS NOTE:     SUBJECTIVE/ROS: Seen and examined. Pain controlled.     OBJECTIVE:  Vital Signs Last 24 Hrs  T(C): 36.9 (26 Jan 2023 05:43), Max: 37.2 (25 Jan 2023 22:31)  T(F): 98.5 (26 Jan 2023 05:43), Max: 98.9 (25 Jan 2023 22:31)  HR: 75 (26 Jan 2023 05:43) (60 - 84)  BP: 128/66 (26 Jan 2023 05:43) (112/61 - 154/75)  BP(mean): 84 (25 Jan 2023 14:00) (84 - 96)  RR: 16 (26 Jan 2023 05:43) (15 - 20)  SpO2: 100% (26 Jan 2023 05:43) (97% - 100%)  Parameters below as of 26 Jan 2023 05:43  Patient On (Oxygen Delivery Method): room air      LABS:                                   8.7    9.44  )-----------( 585      ( 25 Jan 2023 01:42 )             28.1   01-25    135  |  97<L>  |  37<H>  ----------------------------<  176<H>  4.9   |  22  |  7.98<H>    Ca    8.2<L>      25 Jan 2023 01:42  Phos  6.9     01-25  Mg     2.10     01-25        PHYSICAL EXAM:  Resp: Nonlabored  R UE:  Nylon sutures intact on dorsal and volar aspect of forearm/wrist/hand  Exposed tendons on volar aspect of hand  3rd ray stump clean with bloody drainage, no signs of infection  Moving fingers  Tenderness distal tips of fingers, SILT  Rest of hand WWP  No pain proximally

## 2023-01-26 NOTE — PROGRESS NOTE ADULT - ASSESSMENT
Patient is a 46 y/o male PMH DM2, Bilateral BKA, ESRD (on HD MWF), last dialyzed yesterday transferred from [facility] emergently for evaluation of evaluation of right finger swelling/pain. Patient reports history of right finger pain after he had a fall one year ago. Patient had a wound on her second/middle finger who he was seeing a hand surgeon for outpatient but unable ultimately to continue seeing due to insurance issues. Patient reports his middle finger developed increased swelling and became black in color about two weeks ago. Denies fevers. Patient transferred to [facility] for further evaluation and emergent OR. Patient received broad spectrum Abx of Zosyn/vanco/clindamycin at [place]. Interpretor phone used for translation with patient. ID# [id number]         septic workup and ID evaluation,send blood and urine cx,serial lactate levels,monitor vitals closley,ivfs hydration,monitor urine output and renal profile,iv abx as per id cons  piperacillin/tazobactam IVPB.. 3.375 Gram(s) IV Intermittent every 12 hours    esrd on hd   Excess fluids and waste products will be removed from your blood; your electrolytes will be balanced; your blood pressure will be controlled.    BP monitoring,continue current antihypertensive meds, low salt diet,followup with PMD in 1-2 weeks      ANEMIA PLAN:  Anemia of chronic disease:  Well controlled by epo  H and H subtherapeutic .  We will continue epo aiming for a HCT of 32-36 %.   We will monitor Iron stores, B12 and RBC folate .  epoetin deidre-epbx (RETACRIT) Injectable 40455 Unit(s) IV Push     Accuchecks monitoring and insulin sliding scale coverage, no concentrated sweets diet, serial labs and f/up with PMD, monitor HB A 1 C every 3-4 mnth  piperacillin/tazobactam IVPB.. 3.375 Gram(s) IV Intermittent every 12 hours

## 2023-01-26 NOTE — PROGRESS NOTE ADULT - ASSESSMENT
47M with steal syndrome s/p AVF proximalization of arterial inflow using right GSV (1/13).    PLAN:   - Duplex reviewed, flow 2850 in proximal segment, with good inflow and outflow  - PPGs reviewed  - Can use AVF as needed  - Aquacel removed over vein harvest site, incision c/d/i     Vascular Surgery  #[id number]

## 2023-01-27 LAB
GLUCOSE BLDC GLUCOMTR-MCNC: 148 MG/DL — HIGH (ref 70–99)
GLUCOSE BLDC GLUCOMTR-MCNC: 198 MG/DL — HIGH (ref 70–99)
GLUCOSE BLDC GLUCOMTR-MCNC: 202 MG/DL — HIGH (ref 70–99)
GLUCOSE BLDC GLUCOMTR-MCNC: 91 MG/DL — SIGNIFICANT CHANGE UP (ref 70–99)

## 2023-01-27 PROCEDURE — 99233 SBSQ HOSP IP/OBS HIGH 50: CPT

## 2023-01-27 PROCEDURE — 99232 SBSQ HOSP IP/OBS MODERATE 35: CPT

## 2023-01-27 RX ORDER — AMPICILLIN SODIUM AND SULBACTAM SODIUM 250; 125 MG/ML; MG/ML
3 INJECTION, POWDER, FOR SUSPENSION INTRAMUSCULAR; INTRAVENOUS EVERY 24 HOURS
Refills: 0 | Status: DISCONTINUED | OUTPATIENT
Start: 2023-01-27 | End: 2023-02-23

## 2023-01-27 RX ADMIN — Medication 1: at 07:42

## 2023-01-27 RX ADMIN — PIPERACILLIN AND TAZOBACTAM 25 GRAM(S): 4; .5 INJECTION, POWDER, LYOPHILIZED, FOR SOLUTION INTRAVENOUS at 02:45

## 2023-01-27 RX ADMIN — Medication 4 UNIT(S): at 07:42

## 2023-01-27 RX ADMIN — DORZOLAMIDE HYDROCHLORIDE TIMOLOL MALEATE 1 DROP(S): 20; 5 SOLUTION/ DROPS OPHTHALMIC at 19:11

## 2023-01-27 RX ADMIN — Medication 81 MILLIGRAM(S): at 12:12

## 2023-01-27 RX ADMIN — Medication 975 MILLIGRAM(S): at 05:57

## 2023-01-27 RX ADMIN — Medication 975 MILLIGRAM(S): at 22:29

## 2023-01-27 RX ADMIN — AMPICILLIN SODIUM AND SULBACTAM SODIUM 200 GRAM(S): 250; 125 INJECTION, POWDER, FOR SUSPENSION INTRAMUSCULAR; INTRAVENOUS at 15:40

## 2023-01-27 RX ADMIN — DORZOLAMIDE HYDROCHLORIDE TIMOLOL MALEATE 1 DROP(S): 20; 5 SOLUTION/ DROPS OPHTHALMIC at 05:57

## 2023-01-27 RX ADMIN — ERYTHROPOIETIN 10000 UNIT(S): 10000 INJECTION, SOLUTION INTRAVENOUS; SUBCUTANEOUS at 17:06

## 2023-01-27 RX ADMIN — INSULIN GLARGINE 14 UNIT(S): 100 INJECTION, SOLUTION SUBCUTANEOUS at 22:29

## 2023-01-27 RX ADMIN — CHLORHEXIDINE GLUCONATE 1 APPLICATION(S): 213 SOLUTION TOPICAL at 12:12

## 2023-01-27 NOTE — PROGRESS NOTE ADULT - ASSESSMENT
Patient is a 48 y/o male PMH DM2, Bilateral BKA, ESRD (on HD MWF), last dialyzed yesterday transferred from [facility] emergently for evaluation of evaluation of right finger swelling/pain. Patient reports history of right finger pain after he had a fall one year ago. Patient had a wound on her second/middle finger who he was seeing a hand surgeon for outpatient but unable ultimately to continue seeing due to insurance issues. Patient reports his middle finger developed increased swelling and became black in color about two weeks ago. Denies fevers. Patient transferred to [facility] for further evaluation and emergent OR. Patient received broad spectrum Abx of Zosyn/vanco/clindamycin at [place]. Interpretor phone used for translation with patient. ID# [id number]         septic workup and ID evaluation,send blood and urine cx,serial lactate levels,monitor vitals closley,ivfs hydration,monitor urine output and renal profile,iv abx as per id cons  piperacillin/tazobactam IVPB.. 3.375 Gram(s) IV Intermittent every 12 hours    esrd on hd   Excess fluids and waste products will be removed from your blood; your electrolytes will be balanced; your blood pressure will be controlled.    BP monitoring,continue current antihypertensive meds, low salt diet,followup with PMD in 1-2 weeks      ANEMIA PLAN:  Anemia of chronic disease:  Well controlled by epo  H and H subtherapeutic .  We will continue epo aiming for a HCT of 32-36 %.   We will monitor Iron stores, B12 and RBC folate .  epoetin deidre-epbx (RETACRIT) Injectable 17440 Unit(s) IV Push     Accuchecks monitoring and insulin sliding scale coverage, no concentrated sweets diet, serial labs and f/up with PMD, monitor HB A 1 C every 3-4 mnth  piperacillin/tazobactam IVPB.. 3.375 Gram(s) IV Intermittent every 12 hours

## 2023-01-27 NOTE — PROGRESS NOTE ADULT - PROBLEM SELECTOR PLAN 1
right 3rd digit gangrene d/t steal syndrome iso of RUE AVF  - management per ortho + vascular surgery   - s/p RUE fistula repair with vascular surgery on 1/13   - s/p right 3rd finger amputation and repeated I&D with ortho (1/14 + 1/16 + 1/19 + 1/25), RTOR next week  - ID recs appreciated: wound cultures grew polymicrobial E coli, Strep, Bacteroides, recommended zosyn through 1/24 however patient still on abx, ortho to clarify   - pain control: ATC Tylenol, Dilaudid 2mg q4h PRN mild pain, 4mg q4h PRN moderate pain, 8mg q4h PRN severe pain, bowel regimen while on opiates  - DVT ppx per primary team right 3rd digit gangrene d/t steal syndrome iso of RUE AVF  - management per ortho + vascular surgery   - s/p RUE fistula repair with vascular surgery on 1/13   - s/p right 3rd finger amputation and repeated I&D with ortho (1/14 + 1/16 + 1/19 + 1/25), RTOR next week  - ID recs appreciated: wound cultures grew polymicrobial E coli, Strep, Bacteroides, recommended zosyn through 1/24 - ortho continuing course given open wounds and pending RTOR  - pain control: ATC Tylenol, Dilaudid 2mg q4h PRN mild pain, 4mg q4h PRN moderate pain, 8mg q4h PRN severe pain, bowel regimen while on opiates  - DVT ppx per primary team right 3rd digit gangrene d/t steal syndrome iso of RUE AVF  - management per ortho + vascular surgery   - s/p RUE fistula repair with vascular surgery on 1/13   - s/p right 3rd finger amputation and repeated I&D with ortho (1/14 + 1/16 + 1/19 + 1/25), RTOR next week  - ID recs appreciated: wound cultures grew polymicrobial E coli, Strep, Bacteroides, recommended zosyn through 1/24 -  ortho d/w ID switching to unasyn for continued course given open wounds and pending RTOR, d/w ID  - pain control: ATC Tylenol, Dilaudid 2mg q4h PRN mild pain, 4mg q4h PRN moderate pain, 8mg q4h PRN severe pain, bowel regimen while on opiates  - DVT ppx per primary team

## 2023-01-27 NOTE — PROGRESS NOTE ADULT - SUBJECTIVE AND OBJECTIVE BOX
ORTHOPAEDICS DAILY PROGRESS NOTE:     SUBJECTIVE/ROS: Seen and examined. Pain controlled. Dressing changed this AM.     OBJECTIVE:  Vital Signs Last 24 Hrs  T(C): 36.3 (27 Jan 2023 01:43), Max: 36.7 (26 Jan 2023 09:15)  T(F): 97.3 (27 Jan 2023 01:43), Max: 98.1 (26 Jan 2023 09:15)  HR: 65 (27 Jan 2023 01:43) (64 - 77)  BP: 138/66 (27 Jan 2023 01:43) (128/58 - 140/69)  RR: 18 (27 Jan 2023 01:43) (17 - 18)  SpO2: 100% (27 Jan 2023 01:43) (99% - 100%)  Parameters below as of 27 Jan 2023 01:43  Patient On (Oxygen Delivery Method): room air        LABS:                        8.4    10.51 )-----------( 592      ( 26 Jan 2023 09:37 )             27.0   01-26    138  |  97<L>  |  30<H>  ----------------------------<  78  3.6   |  25  |  6.54<H>    Ca    8.7      26 Jan 2023 09:37  Phos  5.4     01-26  Mg     2.20     01-26        PHYSICAL EXAM:  Resp: Nonlabored  R UE:  Nylon sutures intact on dorsal and volar aspect of forearm/wrist/hand  Exposed tendons on volar aspect of hand  3rd ray stump clean with bloody drainage, no signs of infection  Moving fingers  Tenderness distal tips of fingers, SILT  Rest of hand WWP  No pain proximally

## 2023-01-27 NOTE — PROGRESS NOTE ADULT - NUTRITIONAL ASSESSMENT
MEDICATIONS  (STANDING):  acetaminophen     Tablet .. 975 milliGRAM(s) Oral every 8 hours  aspirin  chewable 81 milliGRAM(s) Oral daily  chlorhexidine 2% Cloths 1 Application(s) Topical <User Schedule>  dorzolamide 2%/timolol 0.5% Ophthalmic Solution 1 Drop(s) Left EYE two times a day  epoetin deidre-epbx (RETACRIT) Injectable 33331 Unit(s) IV Push <User Schedule>  insulin glargine Injectable (LANTUS) 14 Unit(s) SubCutaneous at bedtime  insulin lispro (ADMELOG) corrective regimen sliding scale   SubCutaneous three times a day before meals  insulin lispro (ADMELOG) corrective regimen sliding scale   SubCutaneous at bedtime  insulin lispro Injectable (ADMELOG) 4 Unit(s) SubCutaneous three times a day before meals  midodrine. 10 milliGRAM(s) Oral three times a day  multivitamin 1 Tablet(s) Oral daily  piperacillin/tazobactam IVPB.. 3.375 Gram(s) IV Intermittent every 12 hours

## 2023-01-27 NOTE — PROGRESS NOTE ADULT - PROBLEM SELECTOR PLAN 2
A1C 7.1%  - home regimen: Basaglar 5U qhs + Humalog 4U TID qac  - Lantus 14U qhs + Admelog 4U TID qac  - if NPO: decrease Lantus by 50% (7U at bedtime) and hold premeal Admelog, SSI [id number]  - CC + Renal restricted diet A1C 7.1%  - home regimen: Basaglar 5U qhs + Humalog 4U TID qac  - Lantus 14U qhs + hold premeal standing Admelog 4U (FS have been low), SSI qac and hs  - if NPO: decrease Lantus by 50% (7U at bedtime) and hold premeal Admelog, SSI [id number]  - CC + Renal restricted diet

## 2023-01-27 NOTE — PROGRESS NOTE ADULT - SUBJECTIVE AND OBJECTIVE BOX
Yamilet Reyna MD  [facility] Division of Hospital Medicine  Pager [phone] (M-F 8AM-5PM)  Other Times: [phone]    Patient is a 47y old  Male who presents with a chief complaint of Steal syndrome (21 Jan 2023 00:53)    SUBJECTIVE / OVERNIGHT EVENTS: no acute events overnight, pain well controlled    MEDICATIONS  (STANDING):  acetaminophen     Tablet .. 975 milliGRAM(s) Oral every 8 hours  aspirin  chewable 81 milliGRAM(s) Oral daily  chlorhexidine 2% Cloths 1 Application(s) Topical <User Schedule>  dorzolamide 2%/timolol 0.5% Ophthalmic Solution 1 Drop(s) Left EYE two times a day  epoetin deidre-epbx (RETACRIT) Injectable 56715 Unit(s) IV Push <User Schedule>  insulin glargine Injectable (LANTUS) 14 Unit(s) SubCutaneous at bedtime  insulin lispro (ADMELOG) corrective regimen sliding scale   SubCutaneous three times a day before meals  insulin lispro (ADMELOG) corrective regimen sliding scale   SubCutaneous at bedtime  insulin lispro Injectable (ADMELOG) 4 Unit(s) SubCutaneous three times a day before meals  midodrine. 10 milliGRAM(s) Oral three times a day  multivitamin 1 Tablet(s) Oral daily  piperacillin/tazobactam IVPB.. 3.375 Gram(s) IV Intermittent every 12 hours    MEDICATIONS  (PRN):  bisacodyl Suppository 10 milliGRAM(s) Rectal daily PRN If no bowel movement  HYDROmorphone   Tablet 2 milliGRAM(s) Oral every 4 hours PRN Mild Pain (1 - 3)  HYDROmorphone   Tablet 4 milliGRAM(s) Oral every 4 hours PRN Moderate Pain (4 - 6)  HYDROmorphone   Tablet 8 milliGRAM(s) Oral every 4 hours PRN Severe Pain (7 - 10)  lactulose Syrup 10 Gram(s) Oral daily PRN Constipation      PHYSICAL EXAM:  Vital Signs Last 24 Hrs  T(C): 36.5 (27 Jan 2023 09:55), Max: 37 (27 Jan 2023 06:00)  T(F): 97.7 (27 Jan 2023 09:55), Max: 98.6 (27 Jan 2023 06:00)  HR: 66 (27 Jan 2023 09:55) (64 - 74)  BP: 128/68 (27 Jan 2023 09:55) (128/68 - 140/69)  RR: 18 (27 Jan 2023 09:55) (17 - 18)  SpO2: 100% (27 Jan 2023 09:55) (99% - 100%)    Parameters below as of 27 Jan 2023 09:55  Patient On (Oxygen Delivery Method): room air        CONSTITUTIONAL: NAD, well-developed, well-groomed  RESPIRATORY: Normal respiratory effort; lungs are clear to auscultation bilaterally  CARDIOVASCULAR: Regular rate and rhythm, normal S1 and S2, no murmur/rub/gallop; No lower extremity edema  GASTROINTESTINAL: Nontender to palpation, normoactive bowel sounds, no rebound/guarding; No hepatosplenomegaly  MUSCULOSKELETAL:  no clubbing or cyanosis of digits; no joint swelling or tenderness to palpation  NEUROLOGY: non-focal; no gross sensory deficits   PSYCH: A+O to person, place, and time; affect appropriate  SKIN: No rashes; warm, surgical site c/d/i    LABS:                        8.4    10.51 )-----------( 592      ( 26 Jan 2023 09:37 )             27.0     01-26    138  |  97<L>  |  30<H>  ----------------------------<  78  3.6   |  25  |  6.54<H>    Ca    8.7      26 Jan 2023 09:37  Phos  5.4     01-26  Mg     2.20     01-26                  RADIOLOGY & ADDITIONAL TESTS:  Results Reviewed:   Imaging Personally Reviewed:  Electrocardiogram Personally Reviewed:    COORDINATION OF CARE:  Care Discussed with Consultants/Other Providers [Y/N]:  Prior or Outpatient Records Reviewed [Y/N]:

## 2023-01-27 NOTE — PROGRESS NOTE ADULT - SUBJECTIVE AND OBJECTIVE BOX
Follow Up: hand infection    Interval History/ROS: Feels pretty good. Pain is minimal. Loose bowel movements, not watery. No fevers or chills.     Allergies  No Known Drug Allergies  [place] (Rash)        ANTIMICROBIALS:  ampicillin/sulbactam  IVPB 3 every 24 hours      OTHER MEDS:  acetaminophen     Tablet .. 975 milliGRAM(s) Oral every 8 hours  aspirin  chewable 81 milliGRAM(s) Oral daily  bisacodyl Suppository 10 milliGRAM(s) Rectal daily PRN  chlorhexidine 2% Cloths 1 Application(s) Topical <User Schedule>  dorzolamide 2%/timolol 0.5% Ophthalmic Solution 1 Drop(s) Left EYE two times a day  epoetin deidre-epbx (RETACRIT) Injectable 43547 Unit(s) IV Push <User Schedule>  HYDROmorphone   Tablet 2 milliGRAM(s) Oral every 4 hours PRN  HYDROmorphone   Tablet 4 milliGRAM(s) Oral every 4 hours PRN  HYDROmorphone   Tablet 8 milliGRAM(s) Oral every 4 hours PRN  insulin glargine Injectable (LANTUS) 14 Unit(s) SubCutaneous at bedtime  insulin lispro (ADMELOG) corrective regimen sliding scale   SubCutaneous three times a day before meals  insulin lispro (ADMELOG) corrective regimen sliding scale   SubCutaneous at bedtime  lactulose Syrup 10 Gram(s) Oral daily PRN  midodrine. 10 milliGRAM(s) Oral three times a day  multivitamin 1 Tablet(s) Oral daily      Vital Signs Last 24 Hrs  T(C): 36.5 (27 Jan 2023 09:55), Max: 37 (27 Jan 2023 06:00)  T(F): 97.7 (27 Jan 2023 09:55), Max: 98.6 (27 Jan 2023 06:00)  HR: 66 (27 Jan 2023 09:55) (65 - 74)  BP: 128/68 (27 Jan 2023 09:55) (128/68 - 140/69)  BP(mean): --  RR: 18 (27 Jan 2023 09:55) (17 - 18)  SpO2: 100% (27 Jan 2023 09:55) (99% - 100%)    Parameters below as of 27 Jan 2023 09:55  Patient On (Oxygen Delivery Method): room air        Physical Exam:  General: non toxic, alert  Cardio: regular rate   Respiratory: nonlabored on room air   abd: nondistended  Musculoskeletal: bilateral leg amputations. right hand dressed, visible surgical edged clean without inflammation   vascular: no phlebitis   Skin: no rash                          8.4    10.51 )-----------( 592      ( 26 Jan 2023 09:37 )             27.0       01-26    138  |  97<L>  |  30<H>  ----------------------------<  78  3.6   |  25  |  6.54<H>    Ca    8.7      26 Jan 2023 09:37  Phos  5.4     01-26  Mg     2.20     01-26            MICROBIOLOGY:    RADIOLOGY:  Images below reviewed personally  Xray Chest 1 View- PORTABLE-Urgent (Xray Chest 1 View- PORTABLE-Urgent .) (01.10.23 @ 05:43)   No acute finding.

## 2023-01-27 NOTE — PROGRESS NOTE ADULT - SUBJECTIVE AND OBJECTIVE BOX
Patient is a 47y Male whom presented to the hospital with esrd on hd     PAST MEDICAL & SURGICAL HISTORY:      MEDICATIONS  (STANDING):  dextrose 5%. 1000 milliLiter(s) (100 mL/Hr) IV Continuous <Continuous>  dextrose 5%. 1000 milliLiter(s) (50 mL/Hr) IV Continuous <Continuous>  dextrose 50% Injectable 25 Gram(s) IV Push once  dextrose 50% Injectable 25 Gram(s) IV Push once  dextrose 50% Injectable 12.5 Gram(s) IV Push once  glucagon  Injectable 1 milliGRAM(s) IntraMuscular once  insulin lispro (ADMELOG) corrective regimen sliding scale   SubCutaneous every 6 hours  pantoprazole    Tablet 40 milliGRAM(s) Oral daily  polyethylene glycol 3350 17 Gram(s) Oral daily  senna 2 Tablet(s) Oral at bedtime      Allergies    No Known Allergies    Intolerances        SOCIAL HISTORY:  Denies ETOh,Smoking,     FAMILY HISTORY:      REVIEW OF SYSTEMS:    CONSTITUTIONAL: No weakness, fevers or chills  NECK: No pain or stiffness  RESPIRATORY: No cough, wheezing, hemoptysis; No shortness of breath  CARDIOVASCULAR: No chest pain or palpitations  GASTROINTESTINAL: No abdominal or epigastric pain. No nausea, vomiting,                               8.4    10.51 )-----------( 592      ( 26 Jan 2023 09:37 )             27.0       CBC Full  -  ( 26 Jan 2023 09:37 )  WBC Count : 10.51 K/uL  RBC Count : 2.84 M/uL  Hemoglobin : 8.4 g/dL  Hematocrit : 27.0 %  Platelet Count - Automated : 592 K/uL  Mean Cell Volume : 95.1 fL  Mean Cell Hemoglobin : 29.6 pg  Mean Cell Hemoglobin Concentration : 31.1 gm/dL  Auto Neutrophil # : x  Auto Lymphocyte # : x  Auto Monocyte # : x  Auto Eosinophil # : x  Auto Basophil # : x  Auto Neutrophil % : x  Auto Lymphocyte % : x  Auto Monocyte % : x  Auto Eosinophil % : x  Auto Basophil % : x      01-26    138  |  97<L>  |  30<H>  ----------------------------<  78  3.6   |  25  |  6.54<H>    Ca    8.7      26 Jan 2023 09:37  Phos  5.4     01-26  Mg     2.20     01-26        CAPILLARY BLOOD GLUCOSE      POCT Blood Glucose.: 148 mg/dL (27 Jan 2023 16:51)  POCT Blood Glucose.: 91 mg/dL (27 Jan 2023 11:43)  POCT Blood Glucose.: 198 mg/dL (27 Jan 2023 07:23)  POCT Blood Glucose.: 193 mg/dL (26 Jan 2023 23:05)      Vital Signs Last 24 Hrs  T(C): 36.8 (27 Jan 2023 16:45), Max: 37 (27 Jan 2023 06:00)  T(F): 98.3 (27 Jan 2023 16:45), Max: 98.6 (27 Jan 2023 06:00)  HR: 69 (27 Jan 2023 16:45) (65 - 74)  BP: 175/83 (27 Jan 2023 16:45) (128/68 - 175/83)  BP(mean): --  RR: 18 (27 Jan 2023 16:45) (17 - 18)  SpO2: 100% (27 Jan 2023 14:01) (100% - 100%)    Parameters below as of 27 Jan 2023 16:45  Patient On (Oxygen Delivery Method): room air                        PHYSICAL EXAM:    Constitutional: NAD  HEENT: conjunctive   clear   Neck:  No JVD  Respiratory: CTAB  Cardiovascular: S1 and S2  Gastrointestinal: BS+, soft, NT/ND   right hand dressed. bilateral BKA   right arm AVF nontender

## 2023-01-27 NOTE — PROGRESS NOTE ADULT - ASSESSMENT
47M with hx of ESRD on HD, PVD s/p bilateral BKA, T2DM who initially presented to [facility] with right finger swelling and pain, found to have steal syndrome transferred to [facility] for further management. s/p revision of AVF with vascular surgery 1/13, and s/p right 3rd finger amputation and repeated I&D with ortho (1/14 + 1/16 + 1/19 +1/25). Briefly required SICU stay for hemorrhagic show, now on ortho floors for further management. Pending RTOR next week.
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
## 2023-01-27 NOTE — PROGRESS NOTE ADULT - ASSESSMENT
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 Repeat I+D on 1/14, 1/16, 1/19, and 1/25  -Pain control/analgesia  -FU AM Labs  -Non-weight bearing right upper extremity in bulky dressing  -Daily dressing and packing changes QD  -FU nephrology and HD (MWF)  -FU ID recs (zosyn)  -Appreciate [facility] surgery recs  -Appreciate ophthalmology recs: outpatient follow-up

## 2023-01-28 LAB
GLUCOSE BLDC GLUCOMTR-MCNC: 160 MG/DL — HIGH (ref 70–99)
GLUCOSE BLDC GLUCOMTR-MCNC: 180 MG/DL — HIGH (ref 70–99)
GLUCOSE BLDC GLUCOMTR-MCNC: 194 MG/DL — HIGH (ref 70–99)
GLUCOSE BLDC GLUCOMTR-MCNC: 90 MG/DL — SIGNIFICANT CHANGE UP (ref 70–99)

## 2023-01-28 PROCEDURE — 99232 SBSQ HOSP IP/OBS MODERATE 35: CPT

## 2023-01-28 RX ADMIN — Medication 1 TABLET(S): at 11:40

## 2023-01-28 RX ADMIN — Medication 975 MILLIGRAM(S): at 13:06

## 2023-01-28 RX ADMIN — AMPICILLIN SODIUM AND SULBACTAM SODIUM 200 GRAM(S): 250; 125 INJECTION, POWDER, FOR SUSPENSION INTRAMUSCULAR; INTRAVENOUS at 14:40

## 2023-01-28 RX ADMIN — Medication 975 MILLIGRAM(S): at 05:29

## 2023-01-28 RX ADMIN — CHLORHEXIDINE GLUCONATE 1 APPLICATION(S): 213 SOLUTION TOPICAL at 05:30

## 2023-01-28 RX ADMIN — MIDODRINE HYDROCHLORIDE 10 MILLIGRAM(S): 2.5 TABLET ORAL at 05:29

## 2023-01-28 RX ADMIN — Medication 1: at 11:40

## 2023-01-28 RX ADMIN — Medication 1: at 07:27

## 2023-01-28 RX ADMIN — Medication 81 MILLIGRAM(S): at 11:40

## 2023-01-28 RX ADMIN — Medication 975 MILLIGRAM(S): at 22:11

## 2023-01-28 RX ADMIN — INSULIN GLARGINE 14 UNIT(S): 100 INJECTION, SOLUTION SUBCUTANEOUS at 22:12

## 2023-01-28 RX ADMIN — MIDODRINE HYDROCHLORIDE 10 MILLIGRAM(S): 2.5 TABLET ORAL at 13:06

## 2023-01-28 RX ADMIN — DORZOLAMIDE HYDROCHLORIDE TIMOLOL MALEATE 1 DROP(S): 20; 5 SOLUTION/ DROPS OPHTHALMIC at 05:29

## 2023-01-28 RX ADMIN — DORZOLAMIDE HYDROCHLORIDE TIMOLOL MALEATE 1 DROP(S): 20; 5 SOLUTION/ DROPS OPHTHALMIC at 17:05

## 2023-01-28 NOTE — PROGRESS NOTE ADULT - NUTRITIONAL ASSESSMENT
MEDICATIONS  (STANDING):  acetaminophen     Tablet .. 975 milliGRAM(s) Oral every 8 hours  aspirin  chewable 81 milliGRAM(s) Oral daily  chlorhexidine 2% Cloths 1 Application(s) Topical <User Schedule>  dorzolamide 2%/timolol 0.5% Ophthalmic Solution 1 Drop(s) Left EYE two times a day  epoetin deidre-epbx (RETACRIT) Injectable 09040 Unit(s) IV Push <User Schedule>  insulin glargine Injectable (LANTUS) 14 Unit(s) SubCutaneous at bedtime  insulin lispro (ADMELOG) corrective regimen sliding scale   SubCutaneous three times a day before meals  insulin lispro (ADMELOG) corrective regimen sliding scale   SubCutaneous at bedtime  insulin lispro Injectable (ADMELOG) 4 Unit(s) SubCutaneous three times a day before meals  midodrine. 10 milliGRAM(s) Oral three times a day  multivitamin 1 Tablet(s) Oral daily  piperacillin/tazobactam IVPB.. 3.375 Gram(s) IV Intermittent every 12 hours MEDICATIONS  (STANDING):  acetaminophen     Tablet .. 975 milliGRAM(s) Oral every 8 hours  ampicillin/sulbactam  IVPB 3 Gram(s) IV Intermittent every 24 hours  aspirin  chewable 81 milliGRAM(s) Oral daily  chlorhexidine 2% Cloths 1 Application(s) Topical <User Schedule>  dorzolamide 2%/timolol 0.5% Ophthalmic Solution 1 Drop(s) Left EYE two times a day  epoetin deidre-epbx (RETACRIT) Injectable 10495 Unit(s) IV Push <User Schedule>  insulin glargine Injectable (LANTUS) 14 Unit(s) SubCutaneous at bedtime  insulin lispro (ADMELOG) corrective regimen sliding scale   SubCutaneous at bedtime  insulin lispro (ADMELOG) corrective regimen sliding scale   SubCutaneous three times a day before meals  midodrine. 10 milliGRAM(s) Oral three times a day  multivitamin 1 Tablet(s) Oral daily

## 2023-01-28 NOTE — PROGRESS NOTE ADULT - PROBLEM SELECTOR PLAN 2
A1C 7.1%  - home regimen: Basaglar 5U qhs + Humalog 4U TID qac  - Lantus 14U qhs + hold premeal standing Admelog 4U (FS have been low), SSI qac and hs  - if NPO: decrease Lantus by 50% (7U at bedtime) and hold premeal Admelog, SSI [id number]  - CC + Renal restricted diet

## 2023-01-28 NOTE — PROGRESS NOTE ADULT - ASSESSMENT
Patient is a 46 y/o male PMH DM2, Bilateral BKA, ESRD (on HD MWF), last dialyzed yesterday transferred from [facility] emergently for evaluation of evaluation of right finger swelling/pain. Patient reports history of right finger pain after he had a fall one year ago. Patient had a wound on her second/middle finger who he was seeing a hand surgeon for outpatient but unable ultimately to continue seeing due to insurance issues. Patient reports his middle finger developed increased swelling and became black in color about two weeks ago. Denies fevers. Patient transferred to [facility] for further evaluation and emergent OR. Patient received broad spectrum Abx of Zosyn/vanco/clindamycin at [place]. Interpretor phone used for translation with patient. ID# [id number]         septic workup and ID evaluation,send blood and urine cx,serial lactate levels,monitor vitals closley,ivfs hydration,monitor urine output and renal profile,iv abx as per id cons  piperacillin/tazobactam IVPB.. 3.375 Gram(s) IV Intermittent every 12 hours    esrd on hd   Excess fluids and waste products will be removed from your blood; your electrolytes will be balanced; your blood pressure will be controlled.    BP monitoring,continue current antihypertensive meds, low salt diet,followup with PMD in 1-2 weeks      ANEMIA PLAN:  Anemia of chronic disease:  Well controlled by epo  H and H subtherapeutic .  We will continue epo aiming for a HCT of 32-36 %.   We will monitor Iron stores, B12 and RBC folate .  epoetin deidre-epbx (RETACRIT) Injectable 43797 Unit(s) IV Push     Accuchecks monitoring and insulin sliding scale coverage, no concentrated sweets diet, serial labs and f/up with PMD, monitor HB A 1 C every 3-4 mnth  piperacillin/tazobactam IVPB.. 3.375 Gram(s) IV Intermittent every 12 hours

## 2023-01-28 NOTE — PROGRESS NOTE ADULT - SUBJECTIVE AND OBJECTIVE BOX
ORTHOPAEDICS DAILY PROGRESS NOTE:     SUBJECTIVE/ROS: Seen and examined. Pain controlled. Dressing changed this AM.     OBJECTIVE:  Vital Signs Last 24 Hrs  T(C): 36.9 (28 Jan 2023 01:33), Max: 37 (27 Jan 2023 06:00)  T(F): 98.5 (28 Jan 2023 01:33), Max: 98.6 (27 Jan 2023 06:00)  HR: 72 (28 Jan 2023 01:33) (66 - 81)  BP: 121/61 (28 Jan 2023 01:33) (103/62 - 175/83)  RR: 18 (28 Jan 2023 01:33) (17 - 18)  SpO2: 100% (28 Jan 2023 01:33) (100% - 100%)  Parameters below as of 28 Jan 2023 01:33  Patient On (Oxygen Delivery Method): room air    LABS:                                   8.4    10.51 )-----------( 592      ( 26 Jan 2023 09:37 )             27.0   01-26    138  |  97<L>  |  30<H>  ----------------------------<  78  3.6   |  25  |  6.54<H>    Ca    8.7      26 Jan 2023 09:37  Phos  5.4     01-26  Mg     2.20     01-26      PHYSICAL EXAM:  Resp: Nonlabored  R UE:  Nylon sutures intact on dorsal and volar aspect of forearm/wrist/hand  Exposed tendons on volar aspect of hand  3rd ray stump clean with bloody drainage, no signs of infection  Moving fingers  Tenderness distal tips of fingers, SILT  Rest of hand WWP  No pain proximally

## 2023-01-28 NOTE — PROGRESS NOTE ADULT - ASSESSMENT
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 Repeat I+D on 1/14, 1/16, 1/19, and 1/25.  -Pain control/analgesia  -FU AM Labs  -Non-weight bearing right upper extremity in bulky dressing  -Daily dressing and packing changes QD  -FU nephrology and HD (MWF)  -FU ID recs (zosyn)  -Appreciate [facility] surgery recs  -Appreciate ophthalmology recs: outpatient follow-up

## 2023-01-28 NOTE — PROGRESS NOTE ADULT - SUBJECTIVE AND OBJECTIVE BOX
Patient is a 47y old  Male who presents with a chief complaint of Steal syndrome (21 Jan 2023 00:53)      INTERVAL HPI/OVERNIGHT EVENTS: PRASHANTH overnight. Pt sitting up in bed eating lunch. Not c/p pain. Noted that he has not been requiring Dilaudid doses.      REVIEW OF SYSTEMS:    CONSTITUTIONAL: No weakness, fevers or chills  EYES/ENT: No visual changes;  No vertigo or throat pain   NECK: No pain or stiffness  RESPIRATORY: No cough, wheezing, hemoptysis; No shortness of breath  CARDIOVASCULAR: No chest pain or palpitations  GASTROINTESTINAL: No abdominal or epigastric pain. No nausea, vomiting, or hematemesis; No diarrhea or constipation. No melena or hematochezia.  GENITOURINARY: No dysuria, frequency or hematuria  NEUROLOGICAL: No numbness or weakness  All other review of systems is negative unless indicated above.    FAMILY HISTORY:    T(C): 37.2 (01-28-23 @ 09:04), Max: 37.2 (01-28-23 @ 09:04)  HR: 77 (01-28-23 @ 09:04) (67 - 81)  BP: 128/69 (01-28-23 @ 09:04) (103/62 - 175/83)  RR: 17 (01-28-23 @ 09:04) (17 - 18)  SpO2: 97% (01-28-23 @ 09:04) (97% - 100%)  Wt(kg): --Vital Signs Last 24 Hrs  T(C): 37.2 (28 Jan 2023 09:04), Max: 37.2 (28 Jan 2023 09:04)  T(F): 98.9 (28 Jan 2023 09:04), Max: 98.9 (28 Jan 2023 09:04)  HR: 77 (28 Jan 2023 09:04) (67 - 81)  BP: 128/69 (28 Jan 2023 09:04) (103/62 - 175/83)  BP(mean): --  RR: 17 (28 Jan 2023 09:04) (17 - 18)  SpO2: 97% (28 Jan 2023 09:04) (97% - 100%)    Parameters below as of 28 Jan 2023 09:04  Patient On (Oxygen Delivery Method): room air        PHYSICAL EXAM:  CONSTITUTIONAL: NAD, well-developed, well-groomed  RESPIRATORY: Normal respiratory effort; lungs are clear to auscultation bilaterally  CARDIOVASCULAR: Regular rate and rhythm, normal S1 and S2, no murmur/rub/gallop; No lower extremity edema  GASTROINTESTINAL: Nontender to palpation, normoactive bowel sounds, no rebound/guarding; No hepatosplenomegaly  MUSCULOSKELETAL:  no clubbing or cyanosis of digits; no joint swelling or tenderness to palpation, R hand wrapped (s/p amputation of third finger)  NEUROLOGY: non-focal; no gross sensory deficits   PSYCH: A+O to person, place, and time; affect appropriate  SKIN: No rashes; warm, surgical site c/d/i    Consultant(s) Notes Reviewed:  [x ] YES  [ ] NO  Care Discussed with Consultants/Other Providers [ x] YES  [ ] NO    LABS:            CAPILLARY BLOOD GLUCOSE      POCT Blood Glucose.: 194 mg/dL (28 Jan 2023 11:31)  POCT Blood Glucose.: 160 mg/dL (28 Jan 2023 07:20)  POCT Blood Glucose.: 202 mg/dL (27 Jan 2023 22:16)  POCT Blood Glucose.: 148 mg/dL (27 Jan 2023 16:51)    BLOOD CULTURE      RADIOLOGY & ADDITIONAL TESTS:    Imaging Personally Reviewed:  [ ] YES  [ ] NO  acetaminophen     Tablet .. 975 milliGRAM(s) Oral every 8 hours  ampicillin/sulbactam  IVPB 3 Gram(s) IV Intermittent every 24 hours  aspirin  chewable 81 milliGRAM(s) Oral daily  bisacodyl Suppository 10 milliGRAM(s) Rectal daily PRN  chlorhexidine 2% Cloths 1 Application(s) Topical <User Schedule>  dorzolamide 2%/timolol 0.5% Ophthalmic Solution 1 Drop(s) Left EYE two times a day  epoetin deidre-epbx (RETACRIT) Injectable 95223 Unit(s) IV Push <User Schedule>  HYDROmorphone   Tablet 2 milliGRAM(s) Oral every 4 hours PRN  HYDROmorphone   Tablet 4 milliGRAM(s) Oral every 4 hours PRN  HYDROmorphone   Tablet 8 milliGRAM(s) Oral every 4 hours PRN  insulin glargine Injectable (LANTUS) 14 Unit(s) SubCutaneous at bedtime  insulin lispro (ADMELOG) corrective regimen sliding scale   SubCutaneous three times a day before meals  insulin lispro (ADMELOG) corrective regimen sliding scale   SubCutaneous at bedtime  lactulose Syrup 10 Gram(s) Oral daily PRN  midodrine. 10 milliGRAM(s) Oral three times a day  multivitamin 1 Tablet(s) Oral daily      HEALTH ISSUES - PROBLEM Dx:  Diabetes mellitus    ESRD on dialysis    Impaired vision    Steal syndrome as complication of dialysis access, initial encounter    Prophylactic measure

## 2023-01-28 NOTE — PROGRESS NOTE ADULT - PROBLEM SELECTOR PLAN 1
right 3rd digit gangrene d/t steal syndrome iso of RUE AVF  - management per ortho + vascular surgery   - s/p RUE fistula repair with vascular surgery on 1/13   - s/p right 3rd finger amputation and repeated I&D with ortho (1/14 + 1/16 + 1/19 + 1/25), RTOR next week  - ID recs appreciated: wound cultures grew polymicrobial E coli, Strep, Bacteroides, recommended zosyn through 1/24 -  ortho d/w ID switching to unasyn for continued course given open wounds and pending RTOR, d/w ID  - pain control: ATC Tylenol, Dilaudid 2mg q4h PRN mild pain, 4mg q4h PRN moderate pain, 8mg q4h PRN severe pain, bowel regimen while on opiates  - DVT ppx per primary team

## 2023-01-28 NOTE — PROGRESS NOTE ADULT - SUBJECTIVE AND OBJECTIVE BOX
Patient is a 47y Male whom presented to the hospital with esrd on hd     PAST MEDICAL & SURGICAL HISTORY:      MEDICATIONS  (STANDING):  dextrose 5%. 1000 milliLiter(s) (100 mL/Hr) IV Continuous <Continuous>  dextrose 5%. 1000 milliLiter(s) (50 mL/Hr) IV Continuous <Continuous>  dextrose 50% Injectable 25 Gram(s) IV Push once  dextrose 50% Injectable 25 Gram(s) IV Push once  dextrose 50% Injectable 12.5 Gram(s) IV Push once  glucagon  Injectable 1 milliGRAM(s) IntraMuscular once  insulin lispro (ADMELOG) corrective regimen sliding scale   SubCutaneous every 6 hours  pantoprazole    Tablet 40 milliGRAM(s) Oral daily  polyethylene glycol 3350 17 Gram(s) Oral daily  senna 2 Tablet(s) Oral at bedtime      Allergies    No Known Allergies    Intolerances        SOCIAL HISTORY:  Denies ETOh,Smoking,     FAMILY HISTORY:      REVIEW OF SYSTEMS:    CONSTITUTIONAL: No weakness, fevers or chills  NECK: No pain or stiffness  RESPIRATORY: No cough, wheezing, hemoptysis; No shortness of breath  CARDIOVASCULAR: No chest pain or palpitations  GASTROINTESTINAL: No abdominal or epigastric pain. No nausea, vomiting,                                               CAPILLARY BLOOD GLUCOSE      POCT Blood Glucose.: 90 mg/dL (28 Jan 2023 16:34)  POCT Blood Glucose.: 194 mg/dL (28 Jan 2023 11:31)  POCT Blood Glucose.: 160 mg/dL (28 Jan 2023 07:20)  POCT Blood Glucose.: 202 mg/dL (27 Jan 2023 22:16)      Vital Signs Last 24 Hrs  T(C): 36.8 (28 Jan 2023 13:08), Max: 37.2 (28 Jan 2023 09:04)  T(F): 98.2 (28 Jan 2023 13:08), Max: 98.9 (28 Jan 2023 09:04)  HR: 71 (28 Jan 2023 13:08) (70 - 81)  BP: 119/62 (28 Jan 2023 13:08) (103/62 - 128/69)  BP(mean): --  RR: 16 (28 Jan 2023 13:08) (16 - 18)  SpO2: 95% (28 Jan 2023 13:08) (95% - 100%)    Parameters below as of 28 Jan 2023 13:08  Patient On (Oxygen Delivery Method): room air                        PHYSICAL EXAM:    Constitutional: NAD  HEENT: conjunctive   clear   Neck:  No JVD  Respiratory: CTAB  Cardiovascular: S1 and S2  Gastrointestinal: BS+, soft, NT/ND   right hand dressed. bilateral BKA   right arm AVF nontender    Patient is a 47y Male whom presented to the hospital with esrd on hd     PAST MEDICAL & SURGICAL HISTORY:      MEDICATIONS  (STANDING):  dextrose 5%. 1000 milliLiter(s) (100 mL/Hr) IV Continuous <Continuous>  dextrose 5%. 1000 milliLiter(s) (50 mL/Hr) IV Continuous <Continuous>  dextrose 50% Injectable 25 Gram(s) IV Push once  dextrose 50% Injectable 25 Gram(s) IV Push once  dextrose 50% Injectable 12.5 Gram(s) IV Push once  glucagon  Injectable 1 milliGRAM(s) IntraMuscular once  insulin lispro (ADMELOG) corrective regimen sliding scale   SubCutaneous every 6 hours  pantoprazole    Tablet 40 milliGRAM(s) Oral daily  polyethylene glycol 3350 17 Gram(s) Oral daily  senna 2 Tablet(s) Oral at bedtime      Allergies    No Known Allergies    Intolerances        SOCIAL HISTORY:  Denies ETOh,Smoking,     FAMILY HISTORY:      REVIEW OF SYSTEMS:    CONSTITUTIONAL: No weakness, fevers or chills  NECK: No pain or stiffness  RESPIRATORY: No cough, wheezing, hemoptysis; No shortness of breath  CARDIOVASCULAR: No chest pain or palpitations  GASTROINTESTINAL: No abdominal or epigastric pain. No nausea, vomiting,                                                           CAPILLARY BLOOD GLUCOSE      POCT Blood Glucose.: 90 mg/dL (28 Jan 2023 16:34)  POCT Blood Glucose.: 194 mg/dL (28 Jan 2023 11:31)  POCT Blood Glucose.: 160 mg/dL (28 Jan 2023 07:20)  POCT Blood Glucose.: 202 mg/dL (27 Jan 2023 22:16)      Vital Signs Last 24 Hrs  T(C): 36.8 (28 Jan 2023 13:08), Max: 37.2 (28 Jan 2023 09:04)  T(F): 98.2 (28 Jan 2023 13:08), Max: 98.9 (28 Jan 2023 09:04)  HR: 71 (28 Jan 2023 13:08) (70 - 81)  BP: 119/62 (28 Jan 2023 13:08) (103/62 - 128/69)  BP(mean): --  RR: 16 (28 Jan 2023 13:08) (16 - 18)  SpO2: 95% (28 Jan 2023 13:08) (95% - 100%)    Parameters below as of 28 Jan 2023 13:08  Patient On (Oxygen Delivery Method): room air                    PHYSICAL EXAM:    Constitutional: NAD  HEENT: conjunctive   clear   Neck:  No JVD  Respiratory: CTAB  Cardiovascular: S1 and S2  Gastrointestinal: BS+, soft, NT/ND   right hand dressed. bilateral BKA   right arm AVF nontender

## 2023-01-28 NOTE — PROGRESS NOTE ADULT - ASSESSMENT
47M with hx of ESRD on HD, PVD s/p bilateral BKA, T2DM who initially presented to [facility] with right finger swelling and pain, found to have steal syndrome transferred to [facility] for further management. s/p revision of AVF with vascular surgery 1/13, and s/p right 3rd finger amputation and repeated I&D with ortho (1/14 + 1/16 + 1/19 +1/25). Briefly required SICU stay for hemorrhagic show, now on ortho floors for further management. Pending RTOR next week.

## 2023-01-29 LAB
GLUCOSE BLDC GLUCOMTR-MCNC: 131 MG/DL — HIGH (ref 70–99)
GLUCOSE BLDC GLUCOMTR-MCNC: 158 MG/DL — HIGH (ref 70–99)
GLUCOSE BLDC GLUCOMTR-MCNC: 163 MG/DL — HIGH (ref 70–99)
GLUCOSE BLDC GLUCOMTR-MCNC: 189 MG/DL — HIGH (ref 70–99)

## 2023-01-29 PROCEDURE — 99232 SBSQ HOSP IP/OBS MODERATE 35: CPT

## 2023-01-29 RX ADMIN — DORZOLAMIDE HYDROCHLORIDE TIMOLOL MALEATE 1 DROP(S): 20; 5 SOLUTION/ DROPS OPHTHALMIC at 05:36

## 2023-01-29 RX ADMIN — Medication 975 MILLIGRAM(S): at 05:36

## 2023-01-29 RX ADMIN — Medication 81 MILLIGRAM(S): at 11:44

## 2023-01-29 RX ADMIN — HYDROMORPHONE HYDROCHLORIDE 4 MILLIGRAM(S): 2 INJECTION INTRAMUSCULAR; INTRAVENOUS; SUBCUTANEOUS at 05:36

## 2023-01-29 RX ADMIN — Medication 975 MILLIGRAM(S): at 22:24

## 2023-01-29 RX ADMIN — AMPICILLIN SODIUM AND SULBACTAM SODIUM 200 GRAM(S): 250; 125 INJECTION, POWDER, FOR SUSPENSION INTRAMUSCULAR; INTRAVENOUS at 23:31

## 2023-01-29 RX ADMIN — Medication 1: at 17:00

## 2023-01-29 RX ADMIN — HYDROMORPHONE HYDROCHLORIDE 4 MILLIGRAM(S): 2 INJECTION INTRAMUSCULAR; INTRAVENOUS; SUBCUTANEOUS at 06:36

## 2023-01-29 RX ADMIN — CHLORHEXIDINE GLUCONATE 1 APPLICATION(S): 213 SOLUTION TOPICAL at 05:36

## 2023-01-29 RX ADMIN — Medication 1: at 07:44

## 2023-01-29 RX ADMIN — INSULIN GLARGINE 14 UNIT(S): 100 INJECTION, SOLUTION SUBCUTANEOUS at 23:11

## 2023-01-29 RX ADMIN — Medication 975 MILLIGRAM(S): at 22:35

## 2023-01-29 RX ADMIN — DORZOLAMIDE HYDROCHLORIDE TIMOLOL MALEATE 1 DROP(S): 20; 5 SOLUTION/ DROPS OPHTHALMIC at 19:32

## 2023-01-29 NOTE — PROGRESS NOTE ADULT - SUBJECTIVE AND OBJECTIVE BOX
Patient is a 47y Male whom presented to the hospital with esrd on hd     PAST MEDICAL & SURGICAL HISTORY:      MEDICATIONS  (STANDING):  dextrose 5%. 1000 milliLiter(s) (100 mL/Hr) IV Continuous <Continuous>  dextrose 5%. 1000 milliLiter(s) (50 mL/Hr) IV Continuous <Continuous>  dextrose 50% Injectable 25 Gram(s) IV Push once  dextrose 50% Injectable 25 Gram(s) IV Push once  dextrose 50% Injectable 12.5 Gram(s) IV Push once  glucagon  Injectable 1 milliGRAM(s) IntraMuscular once  insulin lispro (ADMELOG) corrective regimen sliding scale   SubCutaneous every 6 hours  pantoprazole    Tablet 40 milliGRAM(s) Oral daily  polyethylene glycol 3350 17 Gram(s) Oral daily  senna 2 Tablet(s) Oral at bedtime      Allergies    No Known Allergies    Intolerances        SOCIAL HISTORY:  Denies ETOh,Smoking,     FAMILY HISTORY:      REVIEW OF SYSTEMS:    CONSTITUTIONAL: No weakness, fevers or chills  NECK: No pain or stiffness  RESPIRATORY: No cough, wheezing, hemoptysis; No shortness of breath  CARDIOVASCULAR: No chest pain or palpitations  GASTROINTESTINAL: No abdominal or epigastric pain. No nausea, vomiting,                                               CAPILLARY BLOOD GLUCOSE      POCT Blood Glucose.: 131 mg/dL (29 Jan 2023 11:21)  POCT Blood Glucose.: 158 mg/dL (29 Jan 2023 07:25)  POCT Blood Glucose.: 180 mg/dL (28 Jan 2023 21:24)  POCT Blood Glucose.: 90 mg/dL (28 Jan 2023 16:34)      Vital Signs Last 24 Hrs  T(C): 36.4 (29 Jan 2023 09:49), Max: 36.8 (28 Jan 2023 13:08)  T(F): 97.6 (29 Jan 2023 09:49), Max: 98.2 (28 Jan 2023 13:08)  HR: 68 (29 Jan 2023 09:49) (66 - 72)  BP: 138/69 (29 Jan 2023 09:49) (119/62 - 154/76)  BP(mean): --  RR: 18 (29 Jan 2023 09:49) (16 - 18)  SpO2: 100% (29 Jan 2023 09:49) (95% - 100%)    Parameters below as of 29 Jan 2023 09:49  Patient On (Oxygen Delivery Method): room air                                PHYSICAL EXAM:    Constitutional: NAD  HEENT: conjunctive   clear   Neck:  No JVD  Respiratory: CTAB  Cardiovascular: S1 and S2  Gastrointestinal: BS+, soft, NT/ND   right hand dressed. bilateral BKA   right arm AVF nontender

## 2023-01-29 NOTE — PROGRESS NOTE ADULT - NUTRITIONAL ASSESSMENT
MEDICATIONS  (STANDING):  acetaminophen     Tablet .. 975 milliGRAM(s) Oral every 8 hours  aspirin  chewable 81 milliGRAM(s) Oral daily  chlorhexidine 2% Cloths 1 Application(s) Topical <User Schedule>  dorzolamide 2%/timolol 0.5% Ophthalmic Solution 1 Drop(s) Left EYE two times a day  epoetin deidre-epbx (RETACRIT) Injectable 16968 Unit(s) IV Push <User Schedule>  insulin glargine Injectable (LANTUS) 14 Unit(s) SubCutaneous at bedtime  insulin lispro (ADMELOG) corrective regimen sliding scale   SubCutaneous three times a day before meals  insulin lispro (ADMELOG) corrective regimen sliding scale   SubCutaneous at bedtime  insulin lispro Injectable (ADMELOG) 4 Unit(s) SubCutaneous three times a day before meals  midodrine. 10 milliGRAM(s) Oral three times a day  multivitamin 1 Tablet(s) Oral daily  piperacillin/tazobactam IVPB.. 3.375 Gram(s) IV Intermittent every 12 hours

## 2023-01-29 NOTE — PROGRESS NOTE ADULT - ASSESSMENT
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 Repeat I+D on 1/14, 1/16, 1/19, and 1/25.  -Pain control/analgesia  -FU AM Labs  -Non-weight bearing right upper extremity in bulky dressing  -Daily dressing and packing changes QD  -FU nephrology and HD (MWF)  -FU ID recs (zosyn)  -Appreciate [facility] surgery recs  -Appreciate ophthalmology recs: outpatient follow-up A/P: 47y y/o Male 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 Repeat I+D on 1/14, 1/16, 1/19, and 1/25.  -Pain control/analgesia  -FU AM Labs  -Non-weight bearing right upper extremity in bulky dressing  -Daily dressing and packing changes QD  -FU nephrology and HD (MWF)  -FU ID recs (zosyn)  -Appreciate [facility] surgery recs  -Appreciate ophthalmology recs: outpatient follow-up    Orthopaedic Surgery  [facility] [place] [id number]  [facility]        [id number]  [facility]  [id number]/ [phone]

## 2023-01-29 NOTE — PROGRESS NOTE ADULT - SUBJECTIVE AND OBJECTIVE BOX
Orthopaedic Surgery Progress Note    Subjective:   Patient seen and examined. No acute events overnight. States pain is controlled.    Objective:  T(C): 36.6 (01-29-23 @ 05:29), Max: 37.2 (01-28-23 @ 09:04)  HR: 67 (01-29-23 @ 05:29) (66 - 77)  BP: 130/66 (01-29-23 @ 05:29) (119/62 - 154/76)  RR: 17 (01-29-23 @ 05:29) (16 - 18)  SpO2: 100% (01-29-23 @ 05:29) (95% - 100%)  Wt(kg): --    01-27 @ 07:01  -  01-28 @ 07:00  --------------------------------------------------------  IN: 408 mL / OUT: 2000 mL / NET: -1592 mL        PHYSICAL EXAM:  Resp: Nonlabored  R UE:  Nylon sutures intact on dorsal and volar aspect of forearm/wrist/hand  Exposed tendons on volar aspect of hand  3rd ray stump clean with bloody drainage, no signs of infection  Moving fingers  Tenderness distal tips of fingers, SILT  Rest of hand WWP  No pain proximally Orthopaedic Surgery Progress Note    Subjective:   Patient seen and examined. No acute events overnight. States pain is controlled.    Objective:  Vital Signs Last 24 Hrs  T(C): 36.9 (30 Jan 2023 05:52), Max: 36.9 (30 Jan 2023 05:52)  T(F): 98.4 (30 Jan 2023 05:52), Max: 98.4 (30 Jan 2023 05:52)  HR: 70 (30 Jan 2023 05:52) (66 - 72)  BP: 133/65 (30 Jan 2023 05:52) (133/65 - 146/72)  BP(mean): --  RR: 18 (30 Jan 2023 05:52) (18 - 18)  SpO2: 100% (30 Jan 2023 05:52) (100% - 100%)    Parameters below as of 30 Jan 2023 05:52  Patient On (Oxygen Delivery Method): room air          PHYSICAL EXAM:  Resp: Nonlabored  R UE:  Nylon sutures intact on dorsal and volar aspect of forearm/wrist/hand  Exposed tendons on volar aspect of hand  3rd ray stump clean with dried blood, no signs of infection  Moving fingers  Tenderness distal tips of fingers especially the thumb, SILT  Rest of hand WWP  No pain proximally

## 2023-01-29 NOTE — PROGRESS NOTE ADULT - PROBLEM SELECTOR PLAN 1
right 3rd digit gangrene d/t steal syndrome iso of RUE AVF  - management per ortho + vascular surgery   - s/p RUE fistula repair with vascular surgery on 1/13   - s/p right 3rd finger amputation and repeated I&D with ortho (1/14 + 1/16 + 1/19 + 1/25), RTOR next week  - ID recs appreciated: wound cultures grew polymicrobial E coli, Strep, Bacteroides, recommended zosyn through 1/24 -  ortho d/w ID switching to unasyn for continued course given open wounds and pending RTOR, d/w ID  - Zosyn to end after planned closure this week  - pain control: ATC Tylenol, Dilaudid 2mg q4h PRN mild pain, 4mg q4h PRN moderate pain, 8mg q4h PRN severe pain, bowel regimen while on opiates  - DVT ppx per primary team

## 2023-01-29 NOTE — PROGRESS NOTE ADULT - ASSESSMENT
47M with hx of ESRD on HD, PVD s/p bilateral BKA, T2DM who initially presented to [facility] with right finger swelling and pain, found to have steal syndrome transferred to [facility] for further management. s/p revision of AVF with vascular surgery 1/13, and s/p right 3rd finger amputation and repeated I&D with ortho (1/14 + 1/16 + 1/19 +1/25). Briefly required SICU stay for hemorrhagic show, now on ortho floors for further management. Pending RTOR this week.

## 2023-01-29 NOTE — PROGRESS NOTE ADULT - ASSESSMENT
Patient is a 48 y/o male PMH DM2, Bilateral BKA, ESRD (on HD MWF), last dialyzed yesterday transferred from [facility] emergently for evaluation of evaluation of right finger swelling/pain. Patient reports history of right finger pain after he had a fall one year ago. Patient had a wound on her second/middle finger who he was seeing a hand surgeon for outpatient but unable ultimately to continue seeing due to insurance issues. Patient reports his middle finger developed increased swelling and became black in color about two weeks ago. Denies fevers. Patient transferred to [facility] for further evaluation and emergent OR. Patient received broad spectrum Abx of Zosyn/vanco/clindamycin at [place]. Interpretor phone used for translation with patient. ID# [id number]         septic workup and ID evaluation,send blood and urine cx,serial lactate levels,monitor vitals closley,ivfs hydration,monitor urine output and renal profile,iv abx as per id cons  piperacillin/tazobactam IVPB.. 3.375 Gram(s) IV Intermittent every 12 hours    esrd on hd   Excess fluids and waste products will be removed from your blood; your electrolytes will be balanced; your blood pressure will be controlled.    BP monitoring,continue current antihypertensive meds, low salt diet,followup with PMD in 1-2 weeks      ANEMIA PLAN:  Anemia of chronic disease:  Well controlled by epo  H and H subtherapeutic .  We will continue epo aiming for a HCT of 32-36 %.   We will monitor Iron stores, B12 and RBC folate .  epoetin deidre-epbx (RETACRIT) Injectable 91370 Unit(s) IV Push     Accuchecks monitoring and insulin sliding scale coverage, no concentrated sweets diet, serial labs and f/up with PMD, monitor HB A 1 C every 3-4 mnth  piperacillin/tazobactam IVPB.. 3.375 Gram(s) IV Intermittent every 12 hours

## 2023-01-29 NOTE — PROGRESS NOTE ADULT - PROBLEM SELECTOR PLAN 2
A1C 7.1%  - home regimen: Basaglar 5U qhs + Humalog 4U TID qac  - Lantus 14U qhs + hold premeal standing Admelog 4U (FS have been low), SSI qac and hs  - if NPO: decrease Lantus by 50% (7U at bedtime) and hold premeal Admelog, SSI [id number]  - CC + Renal restricted diet

## 2023-01-29 NOTE — PROGRESS NOTE ADULT - SUBJECTIVE AND OBJECTIVE BOX
Patient is a 47y old  Male who presents with a chief complaint of Steal syndrome (21 Jan 2023 00:53)      INTERVAL HPI/OVERNIGHT EVENTS: PRASHANTH overnight. Pt doing well this afternoon.       REVIEW OF SYSTEMS:    CONSTITUTIONAL: No weakness, fevers or chills  EYES/ENT: No visual changes;  No vertigo or throat pain   NECK: No pain or stiffness  RESPIRATORY: No cough, wheezing, hemoptysis; No shortness of breath  CARDIOVASCULAR: No chest pain or palpitations  GASTROINTESTINAL: No abdominal or epigastric pain. No nausea, vomiting, or hematemesis; No diarrhea or constipation. No melena or hematochezia.  GENITOURINARY: No dysuria, frequency or hematuria  NEUROLOGICAL: No numbness or weakness  All other review of systems is negative unless indicated above.    FAMILY HISTORY:    T(C): 36.4 (01-29-23 @ 09:49), Max: 36.8 (01-28-23 @ 13:08)  HR: 68 (01-29-23 @ 09:49) (66 - 72)  BP: 138/69 (01-29-23 @ 09:49) (119/62 - 154/76)  RR: 18 (01-29-23 @ 09:49) (16 - 18)  SpO2: 100% (01-29-23 @ 09:49) (95% - 100%)  Wt(kg): --Vital Signs Last 24 Hrs  T(C): 36.4 (29 Jan 2023 09:49), Max: 36.8 (28 Jan 2023 13:08)  T(F): 97.6 (29 Jan 2023 09:49), Max: 98.2 (28 Jan 2023 13:08)  HR: 68 (29 Jan 2023 09:49) (66 - 72)  BP: 138/69 (29 Jan 2023 09:49) (119/62 - 154/76)  BP(mean): --  RR: 18 (29 Jan 2023 09:49) (16 - 18)  SpO2: 100% (29 Jan 2023 09:49) (95% - 100%)    Parameters below as of 29 Jan 2023 09:49  Patient On (Oxygen Delivery Method): room air        PHYSICAL EXAM:  CONSTITUTIONAL: NAD, well-developed, well-groomed  RESPIRATORY: Normal respiratory effort; lungs are clear to auscultation bilaterally  CARDIOVASCULAR: Regular rate and rhythm, normal S1 and S2, no murmur/rub/gallop; No lower extremity edema  GASTROINTESTINAL: Nontender to palpation, normoactive bowel sounds, no rebound/guarding; No hepatosplenomegaly  MUSCULOSKELETAL:  no clubbing or cyanosis of digits; no joint swelling or tenderness to palpation, R hand wrapped (s/p amputation of third finger)  NEUROLOGY: non-focal; no gross sensory deficits   PSYCH: A+O to person, place, and time; affect appropriate  SKIN: No rashes; warm, surgical site c/d/i    Consultant(s) Notes Reviewed:  [x ] YES  [ ] NO  Care Discussed with Consultants/Other Providers [ x] YES  [ ] NO    LABS:            CAPILLARY BLOOD GLUCOSE      POCT Blood Glucose.: 131 mg/dL (29 Jan 2023 11:21)  POCT Blood Glucose.: 158 mg/dL (29 Jan 2023 07:25)  POCT Blood Glucose.: 180 mg/dL (28 Jan 2023 21:24)  POCT Blood Glucose.: 90 mg/dL (28 Jan 2023 16:34)    BLOOD CULTURE      RADIOLOGY & ADDITIONAL TESTS:    Imaging Personally Reviewed:  [ ] YES  [ ] NO  acetaminophen     Tablet .. 975 milliGRAM(s) Oral every 8 hours  ampicillin/sulbactam  IVPB 3 Gram(s) IV Intermittent every 24 hours  aspirin  chewable 81 milliGRAM(s) Oral daily  bisacodyl Suppository 10 milliGRAM(s) Rectal daily PRN  chlorhexidine 2% Cloths 1 Application(s) Topical <User Schedule>  dorzolamide 2%/timolol 0.5% Ophthalmic Solution 1 Drop(s) Left EYE two times a day  epoetin deidre-epbx (RETACRIT) Injectable 40303 Unit(s) IV Push <User Schedule>  HYDROmorphone   Tablet 2 milliGRAM(s) Oral every 4 hours PRN  HYDROmorphone   Tablet 4 milliGRAM(s) Oral every 4 hours PRN  HYDROmorphone   Tablet 8 milliGRAM(s) Oral every 4 hours PRN  insulin glargine Injectable (LANTUS) 14 Unit(s) SubCutaneous at bedtime  insulin lispro (ADMELOG) corrective regimen sliding scale   SubCutaneous three times a day before meals  insulin lispro (ADMELOG) corrective regimen sliding scale   SubCutaneous at bedtime  lactulose Syrup 10 Gram(s) Oral daily PRN  midodrine. 10 milliGRAM(s) Oral three times a day  multivitamin 1 Tablet(s) Oral daily      HEALTH ISSUES - PROBLEM Dx:  Diabetes mellitus    ESRD on dialysis    Impaired vision    Steal syndrome as complication of dialysis access, initial encounter    Prophylactic measure

## 2023-01-30 ENCOUNTER — TRANSCRIPTION ENCOUNTER (OUTPATIENT)
Age: 48
End: 2023-01-30

## 2023-01-30 LAB
GLUCOSE BLDC GLUCOMTR-MCNC: 116 MG/DL — HIGH (ref 70–99)
GLUCOSE BLDC GLUCOMTR-MCNC: 126 MG/DL — HIGH (ref 70–99)
GLUCOSE BLDC GLUCOMTR-MCNC: 145 MG/DL — HIGH (ref 70–99)
GLUCOSE BLDC GLUCOMTR-MCNC: 148 MG/DL — HIGH (ref 70–99)

## 2023-01-30 PROCEDURE — 99233 SBSQ HOSP IP/OBS HIGH 50: CPT

## 2023-01-30 RX ORDER — DORZOLAMIDE HYDROCHLORIDE TIMOLOL MALEATE 20; 5 MG/ML; MG/ML
1 SOLUTION/ DROPS OPHTHALMIC
Refills: 0 | Status: DISCONTINUED | OUTPATIENT
Start: 2023-01-30 | End: 2023-02-24

## 2023-01-30 RX ORDER — HYDROMORPHONE HYDROCHLORIDE 2 MG/ML
2 INJECTION INTRAMUSCULAR; INTRAVENOUS; SUBCUTANEOUS ONCE
Refills: 0 | Status: DISCONTINUED | OUTPATIENT
Start: 2023-01-30 | End: 2023-01-30

## 2023-01-30 RX ADMIN — DORZOLAMIDE HYDROCHLORIDE TIMOLOL MALEATE 1 DROP(S): 20; 5 SOLUTION/ DROPS OPHTHALMIC at 23:17

## 2023-01-30 RX ADMIN — DORZOLAMIDE HYDROCHLORIDE TIMOLOL MALEATE 1 DROP(S): 20; 5 SOLUTION/ DROPS OPHTHALMIC at 06:44

## 2023-01-30 RX ADMIN — AMPICILLIN SODIUM AND SULBACTAM SODIUM 200 GRAM(S): 250; 125 INJECTION, POWDER, FOR SUSPENSION INTRAMUSCULAR; INTRAVENOUS at 23:16

## 2023-01-30 RX ADMIN — INSULIN GLARGINE 14 UNIT(S): 100 INJECTION, SOLUTION SUBCUTANEOUS at 23:52

## 2023-01-30 RX ADMIN — Medication 81 MILLIGRAM(S): at 12:12

## 2023-01-30 RX ADMIN — Medication 975 MILLIGRAM(S): at 06:27

## 2023-01-30 RX ADMIN — CHLORHEXIDINE GLUCONATE 1 APPLICATION(S): 213 SOLUTION TOPICAL at 06:28

## 2023-01-30 RX ADMIN — Medication 975 MILLIGRAM(S): at 07:00

## 2023-01-30 RX ADMIN — Medication 975 MILLIGRAM(S): at 23:17

## 2023-01-30 RX ADMIN — MIDODRINE HYDROCHLORIDE 10 MILLIGRAM(S): 2.5 TABLET ORAL at 23:18

## 2023-01-30 RX ADMIN — Medication 1 TABLET(S): at 12:12

## 2023-01-30 RX ADMIN — Medication 975 MILLIGRAM(S): at 14:10

## 2023-01-30 RX ADMIN — Medication 975 MILLIGRAM(S): at 23:50

## 2023-01-30 RX ADMIN — ERYTHROPOIETIN 10000 UNIT(S): 10000 INJECTION, SOLUTION INTRAVENOUS; SUBCUTANEOUS at 21:01

## 2023-01-30 NOTE — PROGRESS NOTE ADULT - ASSESSMENT
Patient is a 46 y/o male PMH DM2, Bilateral BKA, ESRD (on HD MWF), last dialyzed yesterday transferred from [facility] emergently for evaluation of evaluation of right finger swelling/pain. Patient reports history of right finger pain after he had a fall one year ago. Patient had a wound on her second/middle finger who he was seeing a hand surgeon for outpatient but unable ultimately to continue seeing due to insurance issues. Patient reports his middle finger developed increased swelling and became black in color about two weeks ago. Denies fevers. Patient transferred to [facility] for further evaluation and emergent OR. Patient received broad spectrum Abx of Zosyn/vanco/clindamycin at [place]. Interpretor phone used for translation with patient. ID# [id number]         septic workup and ID evaluation,send blood and urine cx,serial lactate levels,monitor vitals closley,ivfs hydration,monitor urine output and renal profile,iv abx as per id cons  piperacillin/tazobactam IVPB.. 3.375 Gram(s) IV Intermittent every 12 hours    esrd on hd   Excess fluids and waste products will be removed from your blood; your electrolytes will be balanced; your blood pressure will be controlled.    BP monitoring,continue current antihypertensive meds, low salt diet,followup with PMD in 1-2 weeks      ANEMIA PLAN:  Anemia of chronic disease:  Well controlled by epo  H and H subtherapeutic .  We will continue epo aiming for a HCT of 32-36 %.   We will monitor Iron stores, B12 and RBC folate .  epoetin deidre-epbx (RETACRIT) Injectable 91716 Unit(s) IV Push     Accuchecks monitoring and insulin sliding scale coverage, no concentrated sweets diet, serial labs and f/up with PMD, monitor HB A 1 C every 3-4 mnth  piperacillin/tazobactam IVPB.. 3.375 Gram(s) IV Intermittent every 12 hours

## 2023-01-30 NOTE — PROGRESS NOTE ADULT - PROBLEM SELECTOR PLAN 2
A1C 7.1%  - home regimen: Basaglar 5U qhs + Humalog 4U TID qac  - Lantus 14U qhs + hold premeal standing Admelog 4U (FS have been low), SSI qac and hs  - if NPO: decrease Lantus by 50% (7U at bedtime) and hold premeal Admelog, SSI [id number]  - CC + Renal restricted diet

## 2023-01-30 NOTE — PROGRESS NOTE ADULT - SUBJECTIVE AND OBJECTIVE BOX
CHIEF COMPLAINT: f/u     SUBJECTIVE / OVERNIGHT EVENTS: Patient seen and examined. No acute events overnight. Pain well controlled and patient without any complaints.    MEDICATIONS  (STANDING):  acetaminophen     Tablet .. 975 milliGRAM(s) Oral every 8 hours  ampicillin/sulbactam  IVPB 3 Gram(s) IV Intermittent every 24 hours  aspirin  chewable 81 milliGRAM(s) Oral daily  chlorhexidine 2% Cloths 1 Application(s) Topical <User Schedule>  dorzolamide 2%/timolol 0.5% Ophthalmic Solution 1 Drop(s) Both EYES two times a day  epoetin deidre-epbx (RETACRIT) Injectable 32460 Unit(s) IV Push <User Schedule>  insulin glargine Injectable (LANTUS) 14 Unit(s) SubCutaneous at bedtime  insulin lispro (ADMELOG) corrective regimen sliding scale   SubCutaneous three times a day before meals  insulin lispro (ADMELOG) corrective regimen sliding scale   SubCutaneous at bedtime  midodrine. 10 milliGRAM(s) Oral three times a day  multivitamin 1 Tablet(s) Oral daily    MEDICATIONS  (PRN):  bisacodyl Suppository 10 milliGRAM(s) Rectal daily PRN If no bowel movement  HYDROmorphone   Tablet 2 milliGRAM(s) Oral every 4 hours PRN Mild Pain (1 - 3)  HYDROmorphone   Tablet 4 milliGRAM(s) Oral every 4 hours PRN Moderate Pain (4 - 6)  HYDROmorphone   Tablet 8 milliGRAM(s) Oral every 4 hours PRN Severe Pain (7 - 10)  lactulose Syrup 10 Gram(s) Oral daily PRN Constipation    VITALS:  T(F): 97.8 (01-30-23 @ 09:17), Max: 98.4 (01-30-23 @ 05:52)  HR: 68 (01-30-23 @ 09:17) (66 - 72)  BP: 130/61 (01-30-23 @ 09:17) (130/61 - 146/72)  RR: 17 (01-30-23 @ 09:17) (17 - 18)  SpO2: 97% (01-30-23 @ 09:17)    PHYSICAL EXAM:  CONSTITUTIONAL: NAD, well-developed, well-groomed  RESPIRATORY: Normal respiratory effort; lungs are clear to auscultation bilaterally  CARDIOVASCULAR: Regular rate and rhythm, normal S1 and S2, no murmur/rub/gallop; No lower extremity edema  GASTROINTESTINAL: Nontender to palpation, normoactive bowel sounds, no rebound/guarding; No hepatosplenomegaly  MUSCULOSKELETAL:  no clubbing or cyanosis of digits; no joint swelling or tenderness to palpation, R hand wrapped (s/p amputation of third finger)  SKIN: No rashes; warm, surgical site c/d/i    LABS:    CAPILLARY BLOOD GLUCOSE  POCT Blood Glucose.: 116 mg/dL (30 Jan 2023 07:15)  POCT Blood Glucose.: 189 mg/dL (29 Jan 2023 22:42)  POCT Blood Glucose.: 163 mg/dL (29 Jan 2023 16:39)  POCT Blood Glucose.: 131 mg/dL (29 Jan 2023 11:21)    [ ] Consultant(s) Notes Reviewed:  [x] Care Discussed with Consultants/Other Providers: Orthopedic PA - discussed plan for this week

## 2023-01-30 NOTE — PROGRESS NOTE ADULT - ASSESSMENT
47M with hx of ESRD on HD, PVD s/p bilateral BKA, T2DM who initially presented to [facility] with right finger swelling and pain, found to have steal syndrome transferred to [facility] for further management. s/p revision of AVF with vascular surgery 1/13, and s/p right 3rd finger amputation and repeated I&D with ortho (1/14 + 1/16 + 1/19 +1/25). Briefly required SICU stay for hemorrhagic show, now on ortho floors for further management. Pending RTOR this week.

## 2023-01-30 NOTE — PROGRESS NOTE ADULT - SUBJECTIVE AND OBJECTIVE BOX
Orthopaedic Surgery Progress Note    Subjective:   Patient seen and examined. No acute events overnight. States pain is controlled.    Objective:  Vital Signs Last 24 Hrs  T(C): 36.9 (30 Jan 2023 05:52), Max: 36.9 (30 Jan 2023 05:52)  T(F): 98.4 (30 Jan 2023 05:52), Max: 98.4 (30 Jan 2023 05:52)  HR: 70 (30 Jan 2023 05:52) (66 - 72)  BP: 133/65 (30 Jan 2023 05:52) (133/65 - 146/72)  BP(mean): --  RR: 18 (30 Jan 2023 05:52) (18 - 18)  SpO2: 100% (30 Jan 2023 05:52) (100% - 100%)    Parameters below as of 30 Jan 2023 05:52  Patient On (Oxygen Delivery Method): room air          PHYSICAL EXAM:  Resp: Nonlabored  R UE:  Nylon sutures intact on dorsal and volar aspect of forearm/wrist/hand  Exposed tendons on volar aspect of hand  3rd ray stump clean with dried blood, no signs of infection  Moving fingers  Tenderness distal tips of fingers especially the thumb, SILT  Rest of hand WWP  No pain proximally

## 2023-01-30 NOTE — PROGRESS NOTE ADULT - SUBJECTIVE AND OBJECTIVE BOX
Patient is a 47y Male whom presented to the hospital with esrd on hd     PAST MEDICAL & SURGICAL HISTORY:      MEDICATIONS  (STANDING):  dextrose 5%. 1000 milliLiter(s) (100 mL/Hr) IV Continuous <Continuous>  dextrose 5%. 1000 milliLiter(s) (50 mL/Hr) IV Continuous <Continuous>  dextrose 50% Injectable 25 Gram(s) IV Push once  dextrose 50% Injectable 25 Gram(s) IV Push once  dextrose 50% Injectable 12.5 Gram(s) IV Push once  glucagon  Injectable 1 milliGRAM(s) IntraMuscular once  insulin lispro (ADMELOG) corrective regimen sliding scale   SubCutaneous every 6 hours  pantoprazole    Tablet 40 milliGRAM(s) Oral daily  polyethylene glycol 3350 17 Gram(s) Oral daily  senna 2 Tablet(s) Oral at bedtime      Allergies    No Known Allergies    Intolerances        SOCIAL HISTORY:  Denies ETOh,Smoking,     FAMILY HISTORY:      REVIEW OF SYSTEMS:    CONSTITUTIONAL: No weakness, fevers or chills  NECK: No pain or stiffness  RESPIRATORY: No cough, wheezing, hemoptysis; No shortness of breath  CARDIOVASCULAR: No chest pain or palpitations  GASTROINTESTINAL: No abdominal or epigastric pain. No nausea, vomiting,                                                         CAPILLARY BLOOD GLUCOSE      POCT Blood Glucose.: 148 mg/dL (30 Jan 2023 11:12)  POCT Blood Glucose.: 116 mg/dL (30 Jan 2023 07:15)  POCT Blood Glucose.: 189 mg/dL (29 Jan 2023 22:42)  POCT Blood Glucose.: 163 mg/dL (29 Jan 2023 16:39)      Vital Signs Last 24 Hrs  T(C): 36.6 (30 Jan 2023 09:17), Max: 36.9 (30 Jan 2023 05:52)  T(F): 97.8 (30 Jan 2023 09:17), Max: 98.4 (30 Jan 2023 05:52)  HR: 68 (30 Jan 2023 09:17) (66 - 72)  BP: 130/61 (30 Jan 2023 09:17) (130/61 - 146/72)  BP(mean): --  RR: 17 (30 Jan 2023 09:17) (17 - 18)  SpO2: 97% (30 Jan 2023 09:17) (97% - 100%)    Parameters below as of 30 Jan 2023 09:17  Patient On (Oxygen Delivery Method): room air                          PHYSICAL EXAM:    Constitutional: NAD  HEENT: conjunctive   clear   Neck:  No JVD  Respiratory: CTAB  Cardiovascular: S1 and S2  Gastrointestinal: BS+, soft, NT/ND   right hand dressed. bilateral BKA   right arm AVF nontender

## 2023-01-30 NOTE — DISCHARGE NOTE PROVIDER - NSDCMRMEDTOKEN_GEN_ALL_CORE_FT
Basaglar KwikPen 100 units/mL subcutaneous solution: 5 unit(s) subcutaneous once a day (at bedtime)  insulin lispro 100 units/mL injectable solution (obsolete): 4 unites prior to meals  5 units q hs  iron:   Vitamin D3:    acetaminophen 325 mg oral tablet: 3 tab(s) orally every 8 hours  amoxicillin-clavulanate 500 mg-125 mg oral tablet: 1 tab(s) orally once a day MDD:1 tab  aspirin 81 mg oral tablet, chewable: 1 tab(s) orally once a day  Basaglar KwikPen 100 units/mL subcutaneous solution: 5 unit(s) subcutaneous once a day (at bedtime)  insulin lispro 100 units/mL injectable solution (obsolete): 4 unites prior to meals  5 units q hs  iron:   oxyCODONE 5 mg oral tablet: 1 tab(s) orally every 4 hours, As needed, Moderate Pain (4 - 6) MDD:6 tabs  Vitamin D3:    acetaminophen 325 mg oral tablet: 3 tab(s) orally every 8 hours  amoxicillin-clavulanate 500 mg-125 mg oral tablet: 1 tab(s) orally once a day MDD:1 tab  aspirin 81 mg oral tablet, chewable: 1 tab(s) orally once a day  Basaglar KwikPen 100 units/mL subcutaneous solution: 4 unit(s) subcutaneous once a day (at bedtime)   iron:   oxyCODONE 5 mg oral tablet: 1 tab(s) orally every 4 hours, As needed, Moderate Pain (4 - 6) MDD:6 tabs  Vitamin D3:

## 2023-01-30 NOTE — PROGRESS NOTE ADULT - PROBLEM SELECTOR PLAN 1
right 3rd digit gangrene d/t steal syndrome iso of RUE AVF  - management per ortho + vascular surgery   - s/p RUE fistula repair with vascular surgery on 1/13   - s/p right 3rd finger amputation and repeated I&D with ortho (1/14 + 1/16 + 1/19 + 1/25), RTOR next week  - ID recs appreciated: wound cultures grew polymicrobial E coli, Strep, Bacteroides, recommended zosyn through 1/24 -  ortho d/w ID switching to unasyn for continued course given open wounds and pending RTOR, d/w ID  - now on unasyn IV to end after planned closure this week  - pain control: ATC Tylenol, Dilaudid 2mg q4h PRN mild pain, 4mg q4h PRN moderate pain, 8mg q4h PRN severe pain, bowel regimen while on opiates  - DVT ppx per primary team  - f/u with orthopedic team regarding whether return to OR for any further washouts

## 2023-01-30 NOTE — DISCHARGE NOTE PROVIDER - PROVIDER TOKENS
PROVIDER:[TOKEN:[[id number]],FOLLOWUP:[2 weeks]],FREE:[LAST:[[facility]],PHONE:[phone]],FAX:[(   [phone]    -],ADDRESS:[[address] [place] [address], [place] [address]]]

## 2023-01-30 NOTE — PROGRESS NOTE ADULT - NUTRITIONAL ASSESSMENT
MEDICATIONS  (STANDING):  acetaminophen     Tablet .. 975 milliGRAM(s) Oral every 8 hours  aspirin  chewable 81 milliGRAM(s) Oral daily  chlorhexidine 2% Cloths 1 Application(s) Topical <User Schedule>  dorzolamide 2%/timolol 0.5% Ophthalmic Solution 1 Drop(s) Left EYE two times a day  epoetin deidre-epbx (RETACRIT) Injectable 15611 Unit(s) IV Push <User Schedule>  insulin glargine Injectable (LANTUS) 14 Unit(s) SubCutaneous at bedtime  insulin lispro (ADMELOG) corrective regimen sliding scale   SubCutaneous three times a day before meals  insulin lispro (ADMELOG) corrective regimen sliding scale   SubCutaneous at bedtime  insulin lispro Injectable (ADMELOG) 4 Unit(s) SubCutaneous three times a day before meals  midodrine. 10 milliGRAM(s) Oral three times a day  multivitamin 1 Tablet(s) Oral daily  piperacillin/tazobactam IVPB.. 3.375 Gram(s) IV Intermittent every 12 hours

## 2023-01-30 NOTE — DISCHARGE NOTE PROVIDER - NSDCCPTREATMENT_GEN_ALL_CORE_FT
PRINCIPAL PROCEDURE  Procedure: Incision and irrigation of finger  Findings and Treatment:       SECONDARY PROCEDURE  Procedure: Finger amputation  Findings and Treatment:     Procedure: Incision and irrigation of finger  Findings and Treatment:

## 2023-01-30 NOTE — DISCHARGE NOTE PROVIDER - HOSPITAL COURSE
46 y/o Male with PMH of DM type 2, bilateral BKA, ESRD (on HD MWF) presented to [facility] on 1/20 as a transfer from [facility] emergently for right 3rd finger and forearm gas gangrene. Pt reported at that time he has had black discoloration of finger for about 2 weeks, and had progressive swelling. Pt went emergently to OR for irrigation and debridement of right hand/forearm as well as amputation of right 3rd finger on 1/10/23 with Dr. Sin. Infectious Diseases was consulted for further recommendations, was placed on IV Vancomycin and Zosyn until OR cultures resulted. Blood Cultures on 1/11 also resulted as no growth final. Ophthalmology examined patient for vision loss in left eye which had been present for months per their note. Per inpatient Ophthalmology team, diagnosed to be chronic vision loss with unclear etiology, recommended outpatient follow up with his private doctor or with [facility] Department of Ophthalmology at [address] [place] [address] [address] [place] [address], office number [phone]. In addition, patient was seen and evaluated by Vascular Surgery as patient has AV fistula for HD on same side as gangrene as well as for concern for steal syndrome from fistula. PPG study recommended by Vascular team confirmed steal syndrome. Pt went to the OR on 1/13/23 with Vascular Dr. Palmer for fistulogram and AV fistula revision. While in surgery Vascular discovered purulent fluid. OR cultures showed E. coli, Strep anginosus and B. Fragilis. Infectious Diseases then recommended IV Unasyn until full closure of hand was performed. Pt then went for subsequent irrigation and debridements of right and forearm on 1/14, 1/16 and 1/19 with Dr. Sin, on 1/19/23 partial closure of wound was also performed. Pt had twice daily wet to dry dressing changes performed in SICU during this time by Orthopedics team. Pt remained in SICU from 1/10-1/20 to have dressing changes performed under conscious sedation. He was started on Midodrine 10mg PO every 8 hours for orthostatic hypotension, and also required Vasopressors on 1/17 to main MAP goals of 65-70mmHg but was able to be taken off after a few hours.  Pt was then downgraded to floor as he was able to tolerate dressing changes with oral pain medications. Pt went for another irrigation and debridement, with partial closure of right hand and forearm on 1/25/23 with Dr. Singh, ortho hand attending. The patient was then transitioned to once daily wet to dry dressing changes. Nephrology Team was also on board for patient's ESRD, was scheduled for dialysis per their recommendations.       Final closure......      Pt tolerated Orthopedic procedures well without any intraoperative complications. Pt received packed red blood cells 1 unit each on 1/16, 1/18 and 1/22, and 2 units on 1/17. Patient is non-weight bearing RIGHT upper extremity. Seen by medical attending for continuity of care and management and cleared for safe discharge. Keep dressing/incision clean, dry and intact. Any sutures/staples to be removed on post-op day #14 your office visit. Please follow up with Dr. Sin in 2 weeks, call the office to make an appointment, [phone]. Please follow up with your PMD for continuity of care and management as medications may have changed. 48 y/o Male with PMH of DM type 2, bilateral BKA, ESRD (on HD MWF) presented to [facility] on 1/20 as a transfer from [facility] emergently for right 3rd finger and forearm gas gangrene. Pt reported at that time he has had black discoloration of finger for about 2 weeks, and had progressive swelling. Pt went emergently to OR for irrigation and debridement of right hand/forearm as well as amputation of right 3rd finger on 1/10/23 with Dr. Sin. Infectious Diseases was consulted for further recommendations, was placed on IV Vancomycin and Zosyn until OR cultures resulted. Blood Cultures on 1/11 also resulted as no growth final. Ophthalmology examined patient for vision loss in left eye which had been present for months per their note. Per inpatient Ophthalmology team, diagnosed to be chronic vision loss with unclear etiology, recommended outpatient follow up with his private doctor or with [facility] Department of Ophthalmology at [address] [place] [address] [address] [place] [address], office number [phone]. In addition, patient was seen and evaluated by Vascular Surgery as patient has AV fistula for HD on same side as gangrene as well as for concern for steal syndrome from fistula. PPG study recommended by Vascular team confirmed steal syndrome. Pt went to the OR on 1/13/23 with Vascular Dr. Palmer for fistulogram and AV fistula revision. While in surgery Vascular discovered purulent fluid. OR cultures showed E. coli, Strep anginosus and B. Fragilis. Infectious Diseases then recommended IV Unasyn until full closure of hand was performed. Pt then went for subsequent irrigation and debridements of right and forearm on 1/14, 1/16 and 1/19 with Dr. Sin, on 1/19/23 partial closure of wound was also performed. Pt had twice daily wet to dry dressing changes performed in SICU during this time by Orthopedics team. Pt remained in SICU from 1/10-1/20 to have dressing changes performed under conscious sedation. He was started on Midodrine 10mg PO every 8 hours for orthostatic hypotension, and also required Vasopressors on 1/17 to main MAP goals of 65-70mmHg but was able to be taken off after a few hours.  Pt was then downgraded to floor as he was able to tolerate dressing changes with oral pain medications. Pt went for another irrigation and debridement, with partial closure of right hand and forearm on 1/25/23 with Dr. Singh, ortho hand attending. The patient was then transitioned to once daily wet to dry dressing changes. Nephrology Team was also on board for patient's ESRD, was scheduled for dialysis per their recommendations. Pt underwent further closure 2/2/23 with Dr Sin, wound vac placed volar aspect R forearm and dorsal aspect R hand.  Pt [facility] for subsequent OR I and D and vac change possible final closure.            Pt tolerated Orthopedic procedures well without any intraoperative complications. Pt received packed red blood cells 1 unit each on 1/16, 1/18 and 1/22, and 2 units on 1/17. Patient is non-weight bearing RIGHT upper extremity. Seen by medical attending for continuity of care and management and cleared for safe discharge. Keep dressing/incision clean, dry and intact.  Please follow up with your PMD for continuity of care and management as medications may have changed. 46 y/o Male with PMH of DM type 2, bilateral BKA, ESRD (on HD MWF) presented to [facility] on 1/20 as a transfer from [facility] emergently for right 3rd finger and forearm gas gangrene. Pt reported at that time he has had black discoloration of finger for about 2 weeks, and had progressive swelling. Pt went emergently to OR for irrigation and debridement of right hand/forearm as well as amputation of right 3rd finger on 1/10/23 with Dr. Sin. Infectious Diseases was consulted for further recommendations, was placed on IV Vancomycin and Zosyn until OR cultures resulted. Blood Cultures on 1/11 also resulted as no growth final. Ophthalmology examined patient for vision loss in left eye which had been present for months per their note. Per inpatient Ophthalmology team, diagnosed to be chronic vision loss with unclear etiology, recommended outpatient follow up with his private doctor or with [facility] Department of Ophthalmology at [address] [place] [address] [address] [place] [address], office number [phone]. In addition, patient was seen and evaluated by Vascular Surgery as patient has AV fistula for HD on same side as gangrene as well as for concern for steal syndrome from fistula. PPG study recommended by Vascular team confirmed steal syndrome. Pt went to the OR on 1/13/23 with Vascular Dr. Palmer for fistulogram and AV fistula revision. While in surgery Vascular discovered purulent fluid. OR cultures showed E. coli, Strep anginosus and B. Fragilis. Infectious Diseases then recommended IV Unasyn until full closure of hand was performed. Pt then went for subsequent irrigation and debridements of right and forearm on 1/14, 1/16 and 1/19 with Dr. Sin, on 1/19/23 partial closure of wound was also performed. Pt had twice daily wet to dry dressing changes performed in SICU during this time by Orthopedics team. Pt remained in SICU from 1/10-1/20 to have dressing changes performed under conscious sedation. He was started on Midodrine 10mg PO every 8 hours for orthostatic hypotension, and also required Vasopressors on 1/17 to main MAP goals of 65-70mmHg but was able to be taken off after a few hours.  Pt was then downgraded to floor as he was able to tolerate dressing changes with oral pain medications. Pt went for another irrigation and debridement, with partial closure of right hand and forearm on 1/25/23 with Dr. Singh, ortho hand attending. The patient was then transitioned to once daily wet to dry dressing changes. Nephrology Team was also on board for patient's ESRD, was scheduled for dialysis per their recommendations. Pt underwent further closure 2/2/23 with Dr Sin, wound vac placed volar aspect R forearm and dorsal aspect R hand.  [facility] for subsequent OR I and D and vac change possible final closure.  Patient will be sent home with a wound vac. ID recommends Augmentin 500 QD until 2/21/23.          Pt tolerated Orthopedic procedures well without any intraoperative complications. Pt received packed red blood cells 1 unit each on 1/16, 1/18 and 1/22, and 2 units on 1/17. Patient is non-weight bearing RIGHT upper extremity. Seen by medical attending for continuity of care and management and cleared for safe discharge. Keep dressing/incision clean, dry and intact.  Please follow up with your PMD for continuity of care and management as medications may have changed. 48 y/o Male with PMH of DM type 2, bilateral BKA, ESRD (on HD MWF) presented to [facility] on 1/20 as a transfer from [facility] emergently for right 3rd finger and forearm gas gangrene. Pt reported at that time he has had black discoloration of finger for about 2 weeks, and had progressive swelling. Pt went emergently to OR for irrigation and debridement of right hand/forearm as well as amputation of right 3rd finger on 1/10/23 with Dr. Sin. Infectious Diseases was consulted for further recommendations, was placed on IV Vancomycin and Zosyn until OR cultures resulted. Blood Cultures on 1/11 also resulted as no growth final. Ophthalmology examined patient for vision loss in left eye which had been present for months per their note. Per inpatient Ophthalmology team, diagnosed to be chronic vision loss with unclear etiology, recommended outpatient follow up with his private doctor or with [facility] Department of Ophthalmology at [address] [place] [address] [address] [place] [address], office number [phone]. In addition, patient was seen and evaluated by Vascular Surgery as patient has AV fistula for HD on same side as gangrene as well as for concern for steal syndrome from fistula. PPG study recommended by Vascular team confirmed steal syndrome. Pt went to the OR on 1/13/23 with Vascular Dr. Palmer for fistulogram and AV fistula revision. While in surgery Vascular discovered purulent fluid. OR cultures showed E. coli, Strep anginosus and B. Fragilis. Infectious Diseases then recommended IV Unasyn until full closure of hand was performed. Pt then went for subsequent irrigation and debridements of right and forearm on 1/14, 1/16 and 1/19 with Dr. Sin, on 1/19/23 partial closure of wound was also performed. Pt had twice daily wet to dry dressing changes performed in SICU during this time by Orthopedics team. Pt remained in SICU from 1/10-1/20 to have dressing changes performed under conscious sedation. He was started on Midodrine 10mg PO every 8 hours for orthostatic hypotension, and also required Vasopressors on 1/17 to main MAP goals of 65-70mmHg but was able to be taken off after a few hours.  Pt was then downgraded to floor as he was able to tolerate dressing changes with oral pain medications. Pt went for another irrigation and debridement, with partial closure of right hand and forearm on 1/25/23 with Dr. Singh, ortho hand attending. The patient was then transitioned to once daily wet to dry dressing changes. Nephrology Team was also on board for patient's ESRD, was scheduled for dialysis per their recommendations. Pt underwent further closure 2/2/23 with Dr Sin, wound vac placed volar aspect R forearm and dorsal aspect R hand.  Pt will follow up with Dr Sin at [facility] Amulatory surgery on 2/7/23 for subsequent OR I and D and vac change possible final closure. Patient will be sent home with a wound vac. The wound vac will be changed in the ambulatory surgery center by Dr. Sin on 2/7/2023, and will not require any home vac changes or care.  ID recommends discharge on PO Augmentin 500 QD until 2/21/23.          Pt tolerated Orthopedic procedures well without any intraoperative complications. Pt received packed red blood cells 1 unit each on 1/16, 1/18 and 1/22, and 2 units on 1/17. Patient is non-weight bearing RIGHT upper extremity. Seen by medical attending for continuity of care and management and cleared for safe discharge. Keep dressing/incision clean, dry and intact.  Please follow up with your PMD for continuity of care and management as medications may have changed. 46 y/o Male with PMH of DM type 2, bilateral BKA, ESRD (on HD MWF) presented to [facility] on 1/20 as a transfer from [facility] emergently for right 3rd finger and forearm gas gangrene. Pt reported at that time he has had black discoloration of finger for about 2 weeks, and had progressive swelling. Pt went emergently to OR for irrigation and debridement of right hand/forearm as well as amputation of right 3rd finger on 1/10/23 with Dr. Sin. Infectious Diseases was consulted for further recommendations, was placed on IV Vancomycin and Zosyn until OR cultures resulted. Blood Cultures on 1/11 also resulted as no growth final. Ophthalmology examined patient for vision loss in left eye which had been present for months per their note. Per inpatient Ophthalmology team, diagnosed to be chronic vision loss with unclear etiology, recommended outpatient follow up with his private doctor or with [facility] Department of Ophthalmology at [address] [place] [address] [address] [place] [address], office number [phone]. In addition, patient was seen and evaluated by Vascular Surgery as patient has AV fistula for HD on same side as gangrene as well as for concern for steal syndrome from fistula. PPG study recommended by Vascular team confirmed steal syndrome. Pt went to the OR on 1/13/23 with Vascular Dr. Palmer for fistulogram and AV fistula revision. While in surgery Vascular discovered purulent fluid. OR cultures showed E. coli, Strep anginosus and B. Fragilis. Infectious Diseases then recommended IV Unasyn until full closure of hand was performed. Pt then went for subsequent irrigation and debridements of right and forearm on 1/14, 1/16 and 1/19 with Dr. Sin, on 1/19/23 partial closure of wound was also performed. Pt had twice daily wet to dry dressing changes performed in SICU during this time by Orthopedics team. Pt remained in SICU from 1/10-1/20 to have dressing changes performed under conscious sedation. He was started on Midodrine 10mg PO every 8 hours for orthostatic hypotension, and also required Vasopressors on 1/17 to main MAP goals of 65-70mmHg but was able to be taken off after a few hours.  Pt was then downgraded to floor as he was able to tolerate dressing changes with oral pain medications. Pt went for another irrigation and debridement, with partial closure of right hand and forearm on 1/25/23 with Dr. Sinhg, ortho hand attending. The patient was then transitioned to once daily wet to dry dressing changes. Nephrology Team was also on board for patient's ESRD, was scheduled for dialysis per their recommendations. Pt underwent further closure 2/2/23 with Dr Sin, wound vac placed volar aspect R forearm and dorsal aspect R hand with Acell placement, the same was then repeated on 2/7/23. On 2/17/23, repeat irrigation and debridement, Acell and vac placement with Dr. Singh. Pt completed 6 week course of Unasyn while inpatient. Pt tolerated Orthopedic procedures well without any intraoperative complications. Pt received packed red blood cells 1 unit each on 1/16, 1/18 and 1/22, and 2 units on 1/17. Patient is non-weight bearing RIGHT upper extremity.     Pt will require home vac changes...........      Seen by medical attending for continuity of care and management and cleared for safe discharge. Keep dressing/incision clean, dry and intact.  Please follow up with your PMD for continuity of care and management as medications may have changed. 48 y/o Male with PMH of DM type 2, bilateral BKA, ESRD (on HD MWF) presented to [facility] on 1/20 as a transfer from [facility] emergently for right 3rd finger and forearm gas gangrene. Pt reported at that time he has had black discoloration of finger for about 2 weeks, and had progressive swelling. Pt went emergently to OR for irrigation and debridement of right hand/forearm as well as amputation of right 3rd finger on 1/10/23 with Dr. Sin. Infectious Diseases was consulted for further recommendations, was placed on IV Vancomycin and Zosyn until OR cultures resulted. Blood Cultures on 1/11 also resulted as no growth final. Ophthalmology examined patient for vision loss in left eye which had been present for months per their note. Per inpatient Ophthalmology team, diagnosed to be chronic vision loss with unclear etiology, recommended outpatient follow up with his private doctor or with [facility] Department of Ophthalmology at [address] [place] [address] [address] [place] [address], office number [phone]. In addition, patient was seen and evaluated by Vascular Surgery as patient has AV fistula for HD on same side as gangrene as well as for concern for steal syndrome from fistula. PPG study recommended by Vascular team confirmed steal syndrome. Pt went to the OR on 1/13/23 with Vascular Dr. Palmer for fistulogram and AV fistula revision. While in surgery Vascular discovered purulent fluid. OR cultures showed E. coli, Strep anginosus and B. Fragilis. Infectious Diseases then recommended IV Unasyn until full closure of hand was performed. Pt then went for subsequent irrigation and debridements of right and forearm on 1/14, 1/16 and 1/19, on 1/19/23 partial closure of wound was also performed. Pt had twice daily wet to dry dressing changes performed in SICU during this time by Orthopedics team. Pt remained in SICU from 1/10-1/20 to have dressing changes performed under conscious sedation. He was started on Midodrine 10mg PO every 8 hours for orthostatic hypotension, and also required Vasopressors on 1/17 to main MAP goals of 65-70mmHg but was able to be taken off after a few hours.  Pt was then downgraded to floor as he was able to tolerate dressing changes with oral pain medications. Pt went for another irrigation and debridement, with partial closure of right hand and forearm on 1/25/23 with Dr. Singh, ortho hand attending. The patient was then transitioned to once daily wet to dry dressing changes. Nephrology Team was also on board for patient's ESRD, was scheduled for dialysis per their recommendations. Pt underwent further closure 2/2/23 with Dr Sin, wound vac placed volar aspect R forearm and dorsal aspect R hand with Acell placement, the same was then repeated on 2/7/23. On 2/17/23, repeat irrigation and debridement, Acell and vac placement with Dr. Singh. Pt completed 6 week course of Unasyn while inpatient. Vac change again performed on 2/22/23.  Pt tolerated Orthopedic procedures well without any intraoperative complications. Pt received packed red blood cells 1 unit each on 1/16, 1/18 and 1/22, and 2 units on 1/17. Patient is non-weight bearing RIGHT upper extremity.     Pt will require home vac changes 2x weekly with VNS, may follow up with Dr Sin in office in 10 days (3/6/23), please call for appt.       Seen by medical attending for continuity of care and management and cleared for safe discharge. Keep dressing/incision clean, dry and intact.  Please follow up with your PMD for continuity of care and management as medications may have changed. 48 y/o Male with PMH of DM type 2, bilateral BKA, ESRD (on HD MWF) presented to [facility] on 1/20 as a transfer from [facility] emergently for right 3rd finger and forearm gas gangrene. Pt reported at that time he has had black discoloration of finger for about 2 weeks, and had progressive swelling. Pt went emergently to OR for irrigation and debridement of right hand/forearm as well as amputation of right 3rd finger on 1/10/23 with Dr. Sin. Infectious Diseases was consulted for further recommendations, was placed on IV Vancomycin and Zosyn until OR cultures resulted. Blood Cultures on 1/11 also resulted as no growth final. Ophthalmology examined patient for vision loss in left eye which had been present for months per their note. Per inpatient Ophthalmology team, diagnosed to be chronic vision loss with unclear etiology, recommended outpatient follow up with his private doctor or with [facility] Department of Ophthalmology at [address] [place] [address] [address] [place] [address], office number [phone]. In addition, patient was seen and evaluated by Vascular Surgery as patient has AV fistula for HD on same side as gangrene as well as for concern for steal syndrome from fistula. PPG study recommended by Vascular team confirmed steal syndrome. Pt went to the OR on 1/13/23 with Vascular Dr. Palmer for fistulogram and AV fistula revision. While in surgery Vascular discovered purulent fluid. OR cultures showed E. coli, Strep anginosus and B. Fragilis. Infectious Diseases then recommended IV Unasyn until full closure of hand was performed. Pt then went for subsequent irrigation and debridements of right and forearm on 1/14, 1/16 and 1/19, on 1/19/23 partial closure of wound was also performed. Pt had twice daily wet to dry dressing changes performed in SICU during this time by Orthopedics team. Pt remained in SICU from 1/10-1/20 to have dressing changes performed under conscious sedation. He was started on Midodrine 10mg PO every 8 hours for orthostatic hypotension, and also required Vasopressors on 1/17 to main MAP goals of 65-70mmHg but was able to be taken off after a few hours.  Pt was then downgraded to floor as he was able to tolerate dressing changes with oral pain medications. Pt went for another irrigation and debridement, with partial closure of right hand and forearm on 1/25/23 with Dr. Singh, ortho hand attending. The patient was then transitioned to once daily wet to dry dressing changes. Nephrology Team was also on board for patient's ESRD, was scheduled for dialysis per their recommendations. Pt underwent further closure 2/2/23 with Dr Sin, wound vac placed volar aspect R forearm and dorsal aspect R hand with Acell placement, the same was then repeated on 2/7/23. On 2/17/23, repeat irrigation and debridement, Acell and vac placement with Dr. Singh. Pt completed 6 week course of Unasyn while inpatient. Vac change again performed on 2/22/23.  Pt tolerated Orthopedic procedures well without any intraoperative complications. Pt received packed red blood cells 1 unit each on 1/16, 1/18 and 1/22, and 2 units on 1/17. Patient is non-weight bearing RIGHT upper extremity.     Pt will require home vac changes 2x weekly with VNS, may follow up with Dr. Sin in office on 3/6/23 please call for appt.       Seen by medical attending for continuity of care and management and cleared for safe discharge. Keep dressing/incision clean, dry and intact.  Please follow up with your PMD for continuity of care and management as medications may have changed.

## 2023-01-30 NOTE — DISCHARGE NOTE PROVIDER - CARE PROVIDER_API CALL
Tracee Sin)  [address], [address]  [place] [address]  Phone: [phone]  Fax: [phone]  Follow Up Time: 2 weeks    [facility] Department of Ophthalmology,   [address] [place] [address], [address]  [place] [address]  Phone: [phone]  Fax: (   )    -  Follow Up Time:

## 2023-01-30 NOTE — DISCHARGE NOTE PROVIDER - CARE PROVIDERS DIRECT ADDRESSES
,[email or web address]

## 2023-01-30 NOTE — PROGRESS NOTE ADULT - ASSESSMENT
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 Repeat I+D on 1/14, 1/16, 1/19, and 1/25.  -Pain control/analgesia  -FU AM Labs  -Non-weight bearing right upper extremity in bulky dressing  -Daily dressing and packing changes QD  -FU nephrology and HD (MWF)  -FU ID recs (zosyn)  -Appreciate [facility] surgery recs  -Appreciate ophthalmology recs: outpatient follow-up    Orthopaedic Surgery  [facility] [id number]  [facility]        [id number]  [facility]  [id number]/ [phone]

## 2023-01-31 LAB
ANION GAP SERPL CALC-SCNC: 19 MMOL/L — HIGH (ref 7–14)
APTT BLD: 23.4 SEC — LOW (ref 27–36.3)
BUN SERPL-MCNC: 37 MG/DL — HIGH (ref 7–23)
CALCIUM SERPL-MCNC: 9.1 MG/DL — SIGNIFICANT CHANGE UP (ref 8.4–10.5)
CHLORIDE SERPL-SCNC: 96 MMOL/L — LOW (ref 98–107)
CO2 SERPL-SCNC: 21 MMOL/L — LOW (ref 22–31)
CREAT SERPL-MCNC: 7.64 MG/DL — HIGH (ref 0.5–1.3)
EGFR: 8 ML/MIN/1.73M2 — LOW
GLUCOSE BLDC GLUCOMTR-MCNC: 131 MG/DL — HIGH (ref 70–99)
GLUCOSE BLDC GLUCOMTR-MCNC: 149 MG/DL — HIGH (ref 70–99)
GLUCOSE BLDC GLUCOMTR-MCNC: 149 MG/DL — HIGH (ref 70–99)
GLUCOSE BLDC GLUCOMTR-MCNC: 190 MG/DL — HIGH (ref 70–99)
GLUCOSE BLDC GLUCOMTR-MCNC: 218 MG/DL — HIGH (ref 70–99)
GLUCOSE BLDC GLUCOMTR-MCNC: 55 MG/DL — LOW (ref 70–99)
GLUCOSE BLDC GLUCOMTR-MCNC: 70 MG/DL — SIGNIFICANT CHANGE UP (ref 70–99)
GLUCOSE SERPL-MCNC: 88 MG/DL — SIGNIFICANT CHANGE UP (ref 70–99)
HCT VFR BLD CALC: 33.2 % — LOW (ref 39–50)
HGB BLD-MCNC: 10.4 G/DL — LOW (ref 13–17)
INR BLD: 1.14 RATIO — SIGNIFICANT CHANGE UP (ref 0.88–1.16)
MCHC RBC-ENTMCNC: 29.5 PG — SIGNIFICANT CHANGE UP (ref 27–34)
MCHC RBC-ENTMCNC: 31.3 GM/DL — LOW (ref 32–36)
MCV RBC AUTO: 94.3 FL — SIGNIFICANT CHANGE UP (ref 80–100)
NRBC # BLD: 0 /100 WBCS — SIGNIFICANT CHANGE UP (ref 0–0)
NRBC # FLD: 0 K/UL — SIGNIFICANT CHANGE UP (ref 0–0)
PLATELET # BLD AUTO: 603 K/UL — HIGH (ref 150–400)
POTASSIUM SERPL-MCNC: 4.2 MMOL/L — SIGNIFICANT CHANGE UP (ref 3.5–5.3)
POTASSIUM SERPL-SCNC: 4.2 MMOL/L — SIGNIFICANT CHANGE UP (ref 3.5–5.3)
PROTHROM AB SERPL-ACNC: 13.3 SEC — SIGNIFICANT CHANGE UP (ref 10.5–13.4)
RBC # BLD: 3.52 M/UL — LOW (ref 4.2–5.8)
RBC # FLD: 16 % — HIGH (ref 10.3–14.5)
SODIUM SERPL-SCNC: 136 MMOL/L — SIGNIFICANT CHANGE UP (ref 135–145)
WBC # BLD: 8.89 K/UL — SIGNIFICANT CHANGE UP (ref 3.8–10.5)
WBC # FLD AUTO: 8.89 K/UL — SIGNIFICANT CHANGE UP (ref 3.8–10.5)

## 2023-01-31 PROCEDURE — 99233 SBSQ HOSP IP/OBS HIGH 50: CPT

## 2023-01-31 RX ORDER — INSULIN GLARGINE 100 [IU]/ML
12 INJECTION, SOLUTION SUBCUTANEOUS AT BEDTIME
Refills: 0 | Status: DISCONTINUED | OUTPATIENT
Start: 2023-01-31 | End: 2023-02-01

## 2023-01-31 RX ADMIN — Medication 1: at 11:47

## 2023-01-31 RX ADMIN — MIDODRINE HYDROCHLORIDE 10 MILLIGRAM(S): 2.5 TABLET ORAL at 14:43

## 2023-01-31 RX ADMIN — Medication 81 MILLIGRAM(S): at 11:48

## 2023-01-31 RX ADMIN — Medication 975 MILLIGRAM(S): at 21:59

## 2023-01-31 RX ADMIN — AMPICILLIN SODIUM AND SULBACTAM SODIUM 200 GRAM(S): 250; 125 INJECTION, POWDER, FOR SUSPENSION INTRAMUSCULAR; INTRAVENOUS at 23:15

## 2023-01-31 RX ADMIN — Medication 975 MILLIGRAM(S): at 05:18

## 2023-01-31 RX ADMIN — MIDODRINE HYDROCHLORIDE 10 MILLIGRAM(S): 2.5 TABLET ORAL at 05:43

## 2023-01-31 RX ADMIN — Medication 975 MILLIGRAM(S): at 05:52

## 2023-01-31 RX ADMIN — DORZOLAMIDE HYDROCHLORIDE TIMOLOL MALEATE 1 DROP(S): 20; 5 SOLUTION/ DROPS OPHTHALMIC at 17:28

## 2023-01-31 RX ADMIN — CHLORHEXIDINE GLUCONATE 1 APPLICATION(S): 213 SOLUTION TOPICAL at 05:43

## 2023-01-31 RX ADMIN — INSULIN GLARGINE 12 UNIT(S): 100 INJECTION, SOLUTION SUBCUTANEOUS at 22:00

## 2023-01-31 RX ADMIN — Medication 1 TABLET(S): at 11:48

## 2023-01-31 RX ADMIN — DORZOLAMIDE HYDROCHLORIDE TIMOLOL MALEATE 1 DROP(S): 20; 5 SOLUTION/ DROPS OPHTHALMIC at 09:06

## 2023-01-31 RX ADMIN — MIDODRINE HYDROCHLORIDE 10 MILLIGRAM(S): 2.5 TABLET ORAL at 21:59

## 2023-01-31 RX ADMIN — Medication 975 MILLIGRAM(S): at 14:41

## 2023-01-31 NOTE — PROGRESS NOTE ADULT - PROBLEM SELECTOR PLAN 2
-XyT2o=3.1%. FS well controlled. Continue with ISS for now and continue to monitor FS closely.   - home regimen: Basaglar 5U qhs + Humalog 4U TID qac  - given episode of hypoglycemis will decrease Lantus to 10units qhs + hold premeal standing Admelog 4U (FS have been low), SSI qac and hs  - if NPO: decrease Lantus by 50% (7U at bedtime) and hold premeal Admelog, SSI [id number]  - CC + Renal restricted diet

## 2023-01-31 NOTE — PROGRESS NOTE ADULT - ASSESSMENT
Patient is a 48 y/o male PMH DM2, Bilateral BKA, ESRD (on HD MWF), last dialyzed yesterday transferred from [facility] emergently for evaluation of evaluation of right finger swelling/pain. Patient reports history of right finger pain after he had a fall one year ago. Patient had a wound on her second/middle finger who he was seeing a hand surgeon for outpatient but unable ultimately to continue seeing due to insurance issues. Patient reports his middle finger developed increased swelling and became black in color about two weeks ago. Denies fevers. Patient transferred to [facility] for further evaluation and emergent OR. Patient received broad spectrum Abx of Zosyn/vanco/clindamycin at [place]. Interpretor phone used for translation with patient. ID# [id number]         septic workup and ID evaluation,send blood and urine cx,serial lactate levels,monitor vitals closley,ivfs hydration,monitor urine output and renal profile,iv abx as per id cons  ampicillin/sulbactam  IVPB 3 Gram(s) IV Intermittent every 24 hours    esrd on hd   Excess fluids and waste products will be removed from your blood; your electrolytes will be balanced; your blood pressure will be controlled.    BP monitoring,continue current antihypertensive meds, low salt diet,followup with PMD in 1-2 weeks      ANEMIA PLAN:  Anemia of chronic disease:  Well controlled by epo  H and H subtherapeutic .  We will continue epo aiming for a HCT of 32-36 %.   We will monitor Iron stores, B12 and RBC folate .  epoetin deidre-epbx (RETACRIT) Injectable 86216 Unit(s) IV Push     Accuchecks monitoring and insulin sliding scale coverage, no concentrated sweets diet, serial labs and f/up with PMD, monitor HB A 1 C every 3-4 mnth  piperacillin/tazobactam IVPB.. 3.375 Gram(s) IV Intermittent every 12 hours

## 2023-01-31 NOTE — PROVIDER CONTACT NOTE (HYPOGLYCEMIA EVENT) - NS PROVIDER CONTACT RECOMMEND-HYPO
Pt asymptomatic. Breakfast arrived and patient to eat full breakfast. Pt to keep 15 gm snacks at BS and 4 oz applejuice. RN to make sure patient has evening snack.

## 2023-01-31 NOTE — PROGRESS NOTE ADULT - PROBLEM SELECTOR PLAN 1
right 3rd digit gangrene d/t steal syndrome iso of RUE AVF  - management per ortho + vascular surgery   - s/p RUE fistula repair with vascular surgery on 1/13   - s/p right 3rd finger amputation and repeated I&D with ortho (1/14 + 1/16 + 1/19 + 1/25), RTOR next week  - ID recs appreciated: wound cultures grew polymicrobial E coli, Strep, Bacteroides, recommended zosyn through 1/24 -  ortho d/w ID switching to unasyn for continued course given open wounds -- d/w ID  - now on unasyn IV to end after planned closure this week  - plan for return to OR in Thursday 2/2 for further wash-out and possible wound vac  - pain control: ATC Tylenol, Dilaudid 2mg q4h PRN mild pain, 4mg q4h PRN moderate pain, 8mg q4h PRN severe pain, bowel regimen while on opiates  - DVT ppx per primary team

## 2023-01-31 NOTE — PROGRESS NOTE ADULT - ASSESSMENT
47M with hx of ESRD on HD, PVD s/p bilateral BKA, T2DM who initially presented to [facility] with right finger swelling and pain, found to have steal syndrome transferred to [facility] for further management. s/p revision of AVF with vascular surgery 1/13, and s/p right 3rd finger amputation and repeated I&D with ortho (1/14 + 1/16 + 1/19 +1/25). Briefly required SICU stay for hemorrhagic show, now on ortho floors for further management. Pending RTOR this week.

## 2023-01-31 NOTE — PROVIDER CONTACT NOTE (HYPOGLYCEMIA EVENT) - NS PROVIDER CONTACT BACKGROUND-HYPO
Age: 47y    Gender: Male    POCT Blood Glucose:  70 mg/dL (01-31-23 @ 07:29)  55 mg/dL (01-31-23 @ 07:07)  131 mg/dL (01-30-23 @ 23:51)  126 mg/dL (01-30-23 @ 21:03)  145 mg/dL (01-30-23 @ 16:21)  148 mg/dL (01-30-23 @ 11:12)      eMAR:  insulin glargine Injectable (LANTUS)   14 Unit(s) SubCutaneous (01-30-23 @ 23:52)

## 2023-01-31 NOTE — PROGRESS NOTE ADULT - SUBJECTIVE AND OBJECTIVE BOX
Patient is a 47y Male whom presented to the hospital with esrd on hd     PAST MEDICAL & SURGICAL HISTORY:      MEDICATIONS  (STANDING):  dextrose 5%. 1000 milliLiter(s) (100 mL/Hr) IV Continuous <Continuous>  dextrose 5%. 1000 milliLiter(s) (50 mL/Hr) IV Continuous <Continuous>  dextrose 50% Injectable 25 Gram(s) IV Push once  dextrose 50% Injectable 25 Gram(s) IV Push once  dextrose 50% Injectable 12.5 Gram(s) IV Push once  glucagon  Injectable 1 milliGRAM(s) IntraMuscular once  insulin lispro (ADMELOG) corrective regimen sliding scale   SubCutaneous every 6 hours  pantoprazole    Tablet 40 milliGRAM(s) Oral daily  polyethylene glycol 3350 17 Gram(s) Oral daily  senna 2 Tablet(s) Oral at bedtime      Allergies    No Known Allergies    Intolerances        SOCIAL HISTORY:  Denies ETOh,Smoking,     FAMILY HISTORY:      REVIEW OF SYSTEMS:    CONSTITUTIONAL: No weakness, fevers or chills  NECK: No pain or stiffness  RESPIRATORY: No cough, wheezing, hemoptysis; No shortness of breath  CARDIOVASCULAR: No chest pain or palpitations  GASTROINTESTINAL: No abdominal or epigastric pain. No nausea, vomiting,                                                                         10.4   8.89  )-----------( 603      ( 31 Jan 2023 05:28 )             33.2       CBC Full  -  ( 31 Jan 2023 05:28 )  WBC Count : 8.89 K/uL  RBC Count : 3.52 M/uL  Hemoglobin : 10.4 g/dL  Hematocrit : 33.2 %  Platelet Count - Automated : 603 K/uL  Mean Cell Volume : 94.3 fL  Mean Cell Hemoglobin : 29.5 pg  Mean Cell Hemoglobin Concentration : 31.3 gm/dL  Auto Neutrophil # : x  Auto Lymphocyte # : x  Auto Monocyte # : x  Auto Eosinophil # : x  Auto Basophil # : x  Auto Neutrophil % : x  Auto Lymphocyte % : x  Auto Monocyte % : x  Auto Eosinophil % : x  Auto Basophil % : x      01-31    136  |  96<L>  |  37<H>  ----------------------------<  88  4.2   |  21<L>  |  7.64<H>    Ca    9.1      31 Jan 2023 05:28        CAPILLARY BLOOD GLUCOSE      POCT Blood Glucose.: 149 mg/dL (31 Jan 2023 16:36)  POCT Blood Glucose.: 190 mg/dL (31 Jan 2023 11:17)  POCT Blood Glucose.: 149 mg/dL (31 Jan 2023 07:57)  POCT Blood Glucose.: 70 mg/dL (31 Jan 2023 07:29)  POCT Blood Glucose.: 55 mg/dL (31 Jan 2023 07:07)  POCT Blood Glucose.: 131 mg/dL (30 Jan 2023 23:51)  POCT Blood Glucose.: 126 mg/dL (30 Jan 2023 21:03)      Vital Signs Last 24 Hrs  T(C): 36.8 (31 Jan 2023 17:43), Max: 37.1 (31 Jan 2023 09:33)  T(F): 98.2 (31 Jan 2023 17:43), Max: 98.7 (31 Jan 2023 09:33)  HR: 69 (31 Jan 2023 17:43) (63 - 76)  BP: 153/76 (31 Jan 2023 17:43) (100/40 - 153/76)  BP(mean): --  RR: 17 (31 Jan 2023 17:43) (16 - 18)  SpO2: 100% (31 Jan 2023 17:43) (98% - 100%)    Parameters below as of 31 Jan 2023 17:43  Patient On (Oxygen Delivery Method): room air            PT/INR - ( 31 Jan 2023 05:28 )   PT: 13.3 sec;   INR: 1.14 ratio         PTT - ( 31 Jan 2023 05:28 )  PTT:23.4 sec              PHYSICAL EXAM:    Constitutional: NAD  HEENT: conjunctive   clear   Neck:  No JVD  Respiratory: CTAB  Cardiovascular: S1 and S2  Gastrointestinal: BS+, soft, NT/ND   right hand dressed. bilateral BKA   right arm AVF nontender

## 2023-01-31 NOTE — PROGRESS NOTE ADULT - SUBJECTIVE AND OBJECTIVE BOX
Orthopedic Progress Note     S:  No acute events overnight, pain is well controlled.  Patient denies any chest pain, SOB, N/V, fevers/chills.    T(C): 36.5 (01-31-23 @ 05:51), Max: 36.7 (01-30-23 @ 13:15)  HR: 63 (01-31-23 @ 05:51) (63 - 76)  BP: 117/59 (01-31-23 @ 05:51) (103/74 - 147/77)  RR: 18 (01-31-23 @ 05:51) (16 - 18)  SpO2: 99% (01-31-23 @ 05:51) (95% - 99%)  Wt(kg): --I&O's Summary    30 Jan 2023 07:01  -  31 Jan 2023 06:45  --------------------------------------------------------  IN: 400 mL / OUT: 1900 mL / NET: -1500 mL        O:  PHYSICAL EXAM:  Resp: Nonlabored  R UE:  Nylon sutures intact on dorsal and volar aspect of forearm/wrist/hand  Exposed tendons on volar aspect of forearm  3rd ray stump clean with dried blood, no signs of infection  Moving fingers  Tenderness distal tips of fingers especially the thumb, SILT  Rest of hand WWP  No pain proximally  No draining purulence, minimal appropriate jennifer-incisional erythema              Labs:                        10.4   8.89  )-----------( 603      ( 31 Jan 2023 05:28 )             33.2    01-31    136  |  96<L>  |  37<H>  ----------------------------<  88  4.2   |  21<L>  |  7.64<H>    Ca    9.1      31 Jan 2023 05:28

## 2023-01-31 NOTE — PROGRESS NOTE ADULT - ASSESSMENT
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 Repeat I+D on 1/14, 1/16, 1/19, and 1/25.  -Repeat I&D, possible wound vac placement to be booked for Thursday 1/2/2023  -Will likely need to DC with PICC, home abx, and wound vac- will begin coordinating for these to be set up for potential discharge post operatively on Thursday   -Social work consult placed, to see patient today   -Pain control/analgesia  -Non-weight bearing right upper extremity in bulky dressing  -Daily dressing and packing changes QD  -FU nephrology and HD ([facility])  -FU ID recs (zosyn)  -Appreciate [facility] surgery recs  -Appreciate ophthalmology recs: outpatient follow-up

## 2023-01-31 NOTE — PROGRESS NOTE ADULT - NUTRITIONAL ASSESSMENT
MEDICATIONS  (STANDING):  acetaminophen     Tablet .. 975 milliGRAM(s) Oral every 8 hours  ampicillin/sulbactam  IVPB 3 Gram(s) IV Intermittent every 24 hours  aspirin  chewable 81 milliGRAM(s) Oral daily  chlorhexidine 2% Cloths 1 Application(s) Topical <User Schedule>  dorzolamide 2%/timolol 0.5% Ophthalmic Solution 1 Drop(s) Both EYES two times a day  epoetin deidre-epbx (RETACRIT) Injectable 16083 Unit(s) IV Push <User Schedule>  insulin glargine Injectable (LANTUS) 12 Unit(s) SubCutaneous at bedtime  insulin lispro (ADMELOG) corrective regimen sliding scale   SubCutaneous three times a day before meals  insulin lispro (ADMELOG) corrective regimen sliding scale   SubCutaneous at bedtime  midodrine. 10 milliGRAM(s) Oral three times a day  multivitamin 1 Tablet(s) Oral daily

## 2023-01-31 NOTE — PROVIDER CONTACT NOTE (HYPOGLYCEMIA EVENT) - NS PROVIDER CONTACT NOTE-TREATMENT-HYPO
15 Gm snack of crackers, repeat FS 70./4 oz Fruit Juice (Specify quantity, date/time)/Other (Specify)

## 2023-01-31 NOTE — PROGRESS NOTE ADULT - SUBJECTIVE AND OBJECTIVE BOX
CHIEF COMPLAINT: f/u steal syndrome    SUBJECTIVE / OVERNIGHT EVENTS: Patient seen and examined. Patient had episode of hypoglycemia this morning which resolved quickly after orange juice and had full breakfast tray. Pain well controlled and patient without any complaints.    MEDICATIONS  (STANDING):  acetaminophen     Tablet .. 975 milliGRAM(s) Oral every 8 hours  ampicillin/sulbactam  IVPB 3 Gram(s) IV Intermittent every 24 hours  aspirin  chewable 81 milliGRAM(s) Oral daily  chlorhexidine 2% Cloths 1 Application(s) Topical <User Schedule>  dorzolamide 2%/timolol 0.5% Ophthalmic Solution 1 Drop(s) Both EYES two times a day  epoetin deidre-epbx (RETACRIT) Injectable 73562 Unit(s) IV Push <User Schedule>  insulin glargine Injectable (LANTUS) 14 Unit(s) SubCutaneous at bedtime  insulin lispro (ADMELOG) corrective regimen sliding scale   SubCutaneous three times a day before meals  insulin lispro (ADMELOG) corrective regimen sliding scale   SubCutaneous at bedtime  midodrine. 10 milliGRAM(s) Oral three times a day  multivitamin 1 Tablet(s) Oral daily    MEDICATIONS  (PRN):  bisacodyl Suppository 10 milliGRAM(s) Rectal daily PRN If no bowel movement  HYDROmorphone   Tablet 2 milliGRAM(s) Oral every 4 hours PRN Mild Pain (1 - 3)  HYDROmorphone   Tablet 4 milliGRAM(s) Oral every 4 hours PRN Moderate Pain (4 - 6)  HYDROmorphone   Tablet 8 milliGRAM(s) Oral every 4 hours PRN Severe Pain (7 - 10)  lactulose Syrup 10 Gram(s) Oral daily PRN Constipation    VITALS:  T(F): 98.7 (01-31-23 @ 09:33), Max: 98.7 (01-31-23 @ 09:33)  HR: 66 (01-31-23 @ 09:33) (63 - 76)  BP: 124/68 (01-31-23 @ 09:33) (103/74 - 147/77)  RR: 18 (01-31-23 @ 09:33) (16 - 18)  SpO2: 98% (01-31-23 @ 09:33)    PHYSICAL EXAM:  CONSTITUTIONAL: NAD, well-developed, well-groomed  RESPIRATORY: Normal respiratory effort; lungs are clear to auscultation bilaterally  CARDIOVASCULAR: Regular rate and rhythm, normal S1 and S2, no murmur/rub/gallop; No lower extremity edema  GASTROINTESTINAL: Nontender to palpation, normoactive bowel sounds, no rebound/guarding; No hepatosplenomegaly  MUSCULOSKELETAL:  no clubbing or cyanosis of digits; no joint swelling or tenderness to palpation, R hand wrapped (s/p amputation of third finger)  SKIN: No rashes; warm, surgical site c/d/i    LABS:              10.4                 136  | 21   | 37           8.89  >-----------< 603     ------------------------< 88                    33.2                 4.2  | 96   | 7.64                                         Ca 9.1   Mg x     Ph x        INR: 1.14 ratio;    PT: 13.3 sec;    PTT: 23.4 sec<L>    CAPILLARY BLOOD GLUCOSE  POCT Blood Glucose.: 190 mg/dL (31 Jan 2023 11:17)  POCT Blood Glucose.: 149 mg/dL (31 Jan 2023 07:57)  POCT Blood Glucose.: 70 mg/dL (31 Jan 2023 07:29)  POCT Blood Glucose.: 55 mg/dL (31 Jan 2023 07:07)  POCT Blood Glucose.: 131 mg/dL (30 Jan 2023 23:51)  POCT Blood Glucose.: 126 mg/dL (30 Jan 2023 21:03)  POCT Blood Glucose.: 145 mg/dL (30 Jan 2023 16:21)    [ ] Consultant(s) Notes Reviewed:  [x] Care Discussed with Consultants/Other Providers: Surgical PA - discussed management of hypoglycemia and change in insulin regimen

## 2023-02-01 ENCOUNTER — TRANSCRIPTION ENCOUNTER (OUTPATIENT)
Age: 48
End: 2023-02-01

## 2023-02-01 DIAGNOSIS — Z01.818 ENCOUNTER FOR OTHER PREPROCEDURAL EXAMINATION: ICD-10-CM

## 2023-02-01 LAB
GLUCOSE BLDC GLUCOMTR-MCNC: 112 MG/DL — HIGH (ref 70–99)
GLUCOSE BLDC GLUCOMTR-MCNC: 122 MG/DL — HIGH (ref 70–99)
GLUCOSE BLDC GLUCOMTR-MCNC: 128 MG/DL — HIGH (ref 70–99)
GLUCOSE BLDC GLUCOMTR-MCNC: 137 MG/DL — HIGH (ref 70–99)
SARS-COV-2 RNA SPEC QL NAA+PROBE: SIGNIFICANT CHANGE UP

## 2023-02-01 PROCEDURE — 99233 SBSQ HOSP IP/OBS HIGH 50: CPT

## 2023-02-01 RX ORDER — INSULIN GLARGINE 100 [IU]/ML
7 INJECTION, SOLUTION SUBCUTANEOUS AT BEDTIME
Refills: 0 | Status: COMPLETED | OUTPATIENT
Start: 2023-02-01 | End: 2023-02-01

## 2023-02-01 RX ADMIN — Medication 975 MILLIGRAM(S): at 21:45

## 2023-02-01 RX ADMIN — DORZOLAMIDE HYDROCHLORIDE TIMOLOL MALEATE 1 DROP(S): 20; 5 SOLUTION/ DROPS OPHTHALMIC at 18:11

## 2023-02-01 RX ADMIN — Medication 81 MILLIGRAM(S): at 13:01

## 2023-02-01 RX ADMIN — MIDODRINE HYDROCHLORIDE 10 MILLIGRAM(S): 2.5 TABLET ORAL at 21:44

## 2023-02-01 RX ADMIN — MIDODRINE HYDROCHLORIDE 10 MILLIGRAM(S): 2.5 TABLET ORAL at 05:24

## 2023-02-01 RX ADMIN — Medication 975 MILLIGRAM(S): at 05:24

## 2023-02-01 RX ADMIN — Medication 975 MILLIGRAM(S): at 13:01

## 2023-02-01 RX ADMIN — CHLORHEXIDINE GLUCONATE 1 APPLICATION(S): 213 SOLUTION TOPICAL at 05:25

## 2023-02-01 RX ADMIN — ERYTHROPOIETIN 10000 UNIT(S): 10000 INJECTION, SOLUTION INTRAVENOUS; SUBCUTANEOUS at 15:15

## 2023-02-01 RX ADMIN — DORZOLAMIDE HYDROCHLORIDE TIMOLOL MALEATE 1 DROP(S): 20; 5 SOLUTION/ DROPS OPHTHALMIC at 05:24

## 2023-02-01 RX ADMIN — INSULIN GLARGINE 7 UNIT(S): 100 INJECTION, SOLUTION SUBCUTANEOUS at 21:44

## 2023-02-01 NOTE — PROGRESS NOTE ADULT - SUBJECTIVE AND OBJECTIVE BOX
Orthopedics      Patient seen and examined at bedside. Feeling well. Pain controlled. No n/v. No acute events overnight.    Vital Signs Last 24 Hrs  T(C): 36.6 (02-01-23 @ 05:15), Max: 37.1 (01-31-23 @ 09:33)  T(F): 97.8 (02-01-23 @ 05:15), Max: 98.7 (01-31-23 @ 09:33)  HR: 68 (02-01-23 @ 05:15) (66 - 73)  BP: 123/59 (02-01-23 @ 05:15) (100/40 - 153/76)  BP(mean): --  RR: 16 (02-01-23 @ 05:15) (16 - 18)  SpO2: 100% (02-01-23 @ 05:15) (98% - 100%)      PT/INR - ( 31 Jan 2023 05:28 )   PT: 13.3 sec;   INR: 1.14 ratio         PTT - ( 31 Jan 2023 05:28 )  PTT:23.4 sec    O:  PHYSICAL EXAM:  Resp: Nonlabored  R UE:  Nylon sutures intact on dorsal and volar aspect of forearm/wrist/hand  Exposed tendons on volar aspect of forearm  3rd ray stump clean with dried blood, no signs of infection  Moving fingers  Tenderness distal tips of fingers especially the thumb, SILT  Rest of hand WWP  No pain proximally  No draining purulence, minimal appropriate jennifer-incisional erythema

## 2023-02-01 NOTE — PROGRESS NOTE ADULT - SUBJECTIVE AND OBJECTIVE BOX
Patient is a 47y Male whom presented to the hospital with esrd on hd     PAST MEDICAL & SURGICAL HISTORY:      MEDICATIONS  (STANDING):  dextrose 5%. 1000 milliLiter(s) (100 mL/Hr) IV Continuous <Continuous>  dextrose 5%. 1000 milliLiter(s) (50 mL/Hr) IV Continuous <Continuous>  dextrose 50% Injectable 25 Gram(s) IV Push once  dextrose 50% Injectable 25 Gram(s) IV Push once  dextrose 50% Injectable 12.5 Gram(s) IV Push once  glucagon  Injectable 1 milliGRAM(s) IntraMuscular once  insulin lispro (ADMELOG) corrective regimen sliding scale   SubCutaneous every 6 hours  pantoprazole    Tablet 40 milliGRAM(s) Oral daily  polyethylene glycol 3350 17 Gram(s) Oral daily  senna 2 Tablet(s) Oral at bedtime      Allergies    No Known Allergies    Intolerances        SOCIAL HISTORY:  Denies ETOh,Smoking,     FAMILY HISTORY:      REVIEW OF SYSTEMS:    CONSTITUTIONAL: No weakness, fevers or chills  NECK: No pain or stiffness  RESPIRATORY: No cough, wheezing, hemoptysis; No shortness of breath  CARDIOVASCULAR: No chest pain or palpitations  GASTROINTESTINAL: No abdominal or epigastric pain. No nausea, vomiting,                                                                   10.4   8.89  )-----------( 603      ( 31 Jan 2023 05:28 )             33.2       CBC Full  -  ( 31 Jan 2023 05:28 )  WBC Count : 8.89 K/uL  RBC Count : 3.52 M/uL  Hemoglobin : 10.4 g/dL  Hematocrit : 33.2 %  Platelet Count - Automated : 603 K/uL  Mean Cell Volume : 94.3 fL  Mean Cell Hemoglobin : 29.5 pg  Mean Cell Hemoglobin Concentration : 31.3 gm/dL  Auto Neutrophil # : x  Auto Lymphocyte # : x  Auto Monocyte # : x  Auto Eosinophil # : x  Auto Basophil # : x  Auto Neutrophil % : x  Auto Lymphocyte % : x  Auto Monocyte % : x  Auto Eosinophil % : x  Auto Basophil % : x      01-31    136  |  96<L>  |  37<H>  ----------------------------<  88  4.2   |  21<L>  |  7.64<H>    Ca    9.1      31 Jan 2023 05:28        CAPILLARY BLOOD GLUCOSE      POCT Blood Glucose.: 122 mg/dL (01 Feb 2023 16:40)  POCT Blood Glucose.: 128 mg/dL (01 Feb 2023 11:31)  POCT Blood Glucose.: 112 mg/dL (01 Feb 2023 07:42)  POCT Blood Glucose.: 218 mg/dL (31 Jan 2023 21:07)      Vital Signs Last 24 Hrs  T(C): 36.7 (01 Feb 2023 17:35), Max: 36.8 (01 Feb 2023 14:15)  T(F): 98 (01 Feb 2023 17:35), Max: 98.2 (01 Feb 2023 14:15)  HR: 65 (01 Feb 2023 17:35) (61 - 73)  BP: 126/72 (01 Feb 2023 17:35) (113/61 - 143/66)  BP(mean): --  RR: 17 (01 Feb 2023 17:35) (16 - 18)  SpO2: 100% (01 Feb 2023 13:40) (100% - 100%)    Parameters below as of 01 Feb 2023 17:35  Patient On (Oxygen Delivery Method): room air            PT/INR - ( 31 Jan 2023 05:28 )   PT: 13.3 sec;   INR: 1.14 ratio         PTT - ( 31 Jan 2023 05:28 )  PTT:23.4 sec      PHYSICAL EXAM:    Constitutional: NAD  HEENT: conjunctive   clear   Neck:  No JVD  Respiratory: CTAB  Cardiovascular: S1 and S2  Gastrointestinal: BS+, soft, NT/ND   right hand dressed. bilateral BKA   right arm AVF nontender

## 2023-02-01 NOTE — PROGRESS NOTE ADULT - ASSESSMENT
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 Repeat I+D on 1/14, 1/16, 1/19, and 1/25.    -Repeat I&D, possible wound vac placement tomorrow  -Will likely need to DC with PICC, home abx, and wound vac- will begin coordinating for these to be set up for potential discharge post operatively on Thursday   -Pain control/analgesia  -Non-weight bearing right upper extremity in bulky dressing  -Daily dressing and packing changes QD  -FU nephrology and HD ([facility])  -FU ID recs (zosyn)  -Appreciate [facility] surgery recs  -Appreciate ophthalmology recs: outpatient follow-up  -Please document medical clearance for OR tomorrow  -NPO after MN

## 2023-02-01 NOTE — PROGRESS NOTE ADULT - PROBLEM SELECTOR PLAN 1
Pt anticipated to RTOR for repeat I&D and possible wound vac in am 2/2/23.   RCRI = 2 (Class III) connoting 10.1% 30-day risk of major adverse cardiac event.   Pt is without acute cardiac condition and tolerated previous I&Ds (1/14 + 1/16 + 1/19 + 1/25). Patient has no signs or symptoms of ACS/decompensated HF. TTE 1/13 showed EF 62%, nl LV function, mild MS. Medically optimized to proceed to OR.    As workup is unlikely to , pt may RTOR for repeat I&D without further cardiac testing.

## 2023-02-01 NOTE — PROGRESS NOTE ADULT - ASSESSMENT
47M with hx of ESRD on HD, PVD s/p bilateral BKA, T2DM who initially presented to [facility] with right finger swelling and pain, found to have steal syndrome transferred to [facility] for further management. s/p revision of AVF with vascular surgery 1/13, and s/p right 3rd finger amputation and repeated I&D with ortho (1/14 + 1/16 + 1/19 +1/25). Briefly required SICU stay for hemorrhagic show, now on ortho floors for further management. Pending RTOR this week.

## 2023-02-01 NOTE — PROGRESS NOTE ADULT - ASSESSMENT
Patient is a 46 y/o male PMH DM2, Bilateral BKA, ESRD (on HD MWF), last dialyzed yesterday transferred from [facility] emergently for evaluation of evaluation of right finger swelling/pain. Patient reports history of right finger pain after he had a fall one year ago. Patient had a wound on her second/middle finger who he was seeing a hand surgeon for outpatient but unable ultimately to continue seeing due to insurance issues. Patient reports his middle finger developed increased swelling and became black in color about two weeks ago. Denies fevers. Patient transferred to [facility] for further evaluation and emergent OR. Patient received broad spectrum Abx of Zosyn/vanco/clindamycin at [place]. Interpretor phone used for translation with patient. ID# [id number]         septic workup and ID evaluation,send blood and urine cx,serial lactate levels,monitor vitals closley,ivfs hydration,monitor urine output and renal profile,iv abx as per id cons  ampicillin/sulbactam  IVPB 3 Gram(s) IV Intermittent every 24 hours    esrd on hd   Excess fluids and waste products will be removed from your blood; your electrolytes will be balanced; your blood pressure will be controlled.    BP monitoring,continue current antihypertensive meds, low salt diet,followup with PMD in 1-2 weeks      ANEMIA PLAN:  Anemia of chronic disease:  Well controlled by epo  H and H subtherapeutic .  We will continue epo aiming for a HCT of 32-36 %.   We will monitor Iron stores, B12 and RBC folate .  epoetin deidre-epbx (RETACRIT) Injectable 43934 Unit(s) IV Push     Accuchecks monitoring and insulin sliding scale coverage, no concentrated sweets diet, serial labs and f/up with PMD, monitor HB A 1 C every 3-4 mnth  piperacillin/tazobactam IVPB.. 3.375 Gram(s) IV Intermittent every 12 hours

## 2023-02-01 NOTE — PROGRESS NOTE ADULT - NUTRITIONAL ASSESSMENT
MEDICATIONS  (STANDING):  acetaminophen     Tablet .. 975 milliGRAM(s) Oral every 8 hours  ampicillin/sulbactam  IVPB 3 Gram(s) IV Intermittent every 24 hours  aspirin  chewable 81 milliGRAM(s) Oral daily  chlorhexidine 2% Cloths 1 Application(s) Topical <User Schedule>  dorzolamide 2%/timolol 0.5% Ophthalmic Solution 1 Drop(s) Both EYES two times a day  epoetin deidre-epbx (RETACRIT) Injectable 94443 Unit(s) IV Push <User Schedule>  insulin glargine Injectable (LANTUS) 12 Unit(s) SubCutaneous at bedtime  insulin lispro (ADMELOG) corrective regimen sliding scale   SubCutaneous three times a day before meals  insulin lispro (ADMELOG) corrective regimen sliding scale   SubCutaneous at bedtime  midodrine. 10 milliGRAM(s) Oral three times a day  multivitamin 1 Tablet(s) Oral daily

## 2023-02-01 NOTE — PROGRESS NOTE ADULT - SUBJECTIVE AND OBJECTIVE BOX
CHIEF COMPLAINT: f/u steal syndrome    SUBJECTIVE / OVERNIGHT EVENTS: Patient seen and examined. No acute events overnight. Pain well controlled and patient without any complaints.    MEDICATIONS  (STANDING):  acetaminophen     Tablet .. 975 milliGRAM(s) Oral every 8 hours  ampicillin/sulbactam  IVPB 3 Gram(s) IV Intermittent every 24 hours  aspirin  chewable 81 milliGRAM(s) Oral daily  chlorhexidine 2% Cloths 1 Application(s) Topical <User Schedule>  dorzolamide 2%/timolol 0.5% Ophthalmic Solution 1 Drop(s) Both EYES two times a day  epoetin deidre-epbx (RETACRIT) Injectable 90057 Unit(s) IV Push <User Schedule>  insulin glargine Injectable (LANTUS) 12 Unit(s) SubCutaneous at bedtime  insulin lispro (ADMELOG) corrective regimen sliding scale   SubCutaneous three times a day before meals  insulin lispro (ADMELOG) corrective regimen sliding scale   SubCutaneous at bedtime  midodrine. 10 milliGRAM(s) Oral three times a day  multivitamin 1 Tablet(s) Oral daily    MEDICATIONS  (PRN):  bisacodyl Suppository 10 milliGRAM(s) Rectal daily PRN If no bowel movement  HYDROmorphone   Tablet 2 milliGRAM(s) Oral every 4 hours PRN Mild Pain (1 - 3)  HYDROmorphone   Tablet 4 milliGRAM(s) Oral every 4 hours PRN Moderate Pain (4 - 6)  HYDROmorphone   Tablet 8 milliGRAM(s) Oral every 4 hours PRN Severe Pain (7 - 10)  lactulose Syrup 10 Gram(s) Oral daily PRN Constipation    VITALS:  T(F): 97.6 (02-01-23 @ 09:29), Max: 98.3 (01-31-23 @ 13:45)  HR: 63 (02-01-23 @ 09:29) (63 - 73)  BP: 132/69 (02-01-23 @ 09:29) (100/40 - 153/76)  RR: 18 (02-01-23 @ 09:29) (16 - 18)  SpO2: 100% (02-01-23 @ 09:29)    PHYSICAL EXAM:  CONSTITUTIONAL: NAD, well-developed, well-groomed  RESPIRATORY: Normal respiratory effort; lungs are clear to auscultation bilaterally  CARDIOVASCULAR: Regular rate and rhythm, normal S1 and S2, no murmur/rub/gallop; No lower extremity edema  GASTROINTESTINAL: Nontender to palpation, normoactive bowel sounds, no rebound/guarding; No hepatosplenomegaly  MUSCULOSKELETAL:  no clubbing or cyanosis of digits; no joint swelling or tenderness to palpation, R hand wrapped (s/p amputation of third finger)  SKIN: No rashes; warm, surgical site c/d/i    LABS:              10.4                 136  | 21   | 37           8.89  >-----------< 603     ------------------------< 88                    33.2                 4.2  | 96   | 7.64                                         Ca 9.1   Mg x     Ph x        INR: 1.14 ratio;    PT: 13.3 sec;    PTT: 23.4 sec<L>    CAPILLARY BLOOD GLUCOSE  POCT Blood Glucose.: 112 mg/dL (01 Feb 2023 07:42)  POCT Blood Glucose.: 218 mg/dL (31 Jan 2023 21:07)  POCT Blood Glucose.: 149 mg/dL (31 Jan 2023 16:36)      [ ] Consultant(s) Notes Reviewed:  [x] Care Discussed with Consultants/Other Providers: Orthopedic PA - discussed pre-op eval

## 2023-02-02 ENCOUNTER — NON-APPOINTMENT (OUTPATIENT)
Age: 48
End: 2023-02-02

## 2023-02-02 LAB
ANION GAP SERPL CALC-SCNC: 13 MMOL/L — SIGNIFICANT CHANGE UP (ref 7–14)
APTT BLD: 30.4 SEC — SIGNIFICANT CHANGE UP (ref 27–36.3)
BLD GP AB SCN SERPL QL: NEGATIVE — SIGNIFICANT CHANGE UP
BUN SERPL-MCNC: 34 MG/DL — HIGH (ref 7–23)
CALCIUM SERPL-MCNC: 9.4 MG/DL — SIGNIFICANT CHANGE UP (ref 8.4–10.5)
CHLORIDE SERPL-SCNC: 94 MMOL/L — LOW (ref 98–107)
CO2 SERPL-SCNC: 27 MMOL/L — SIGNIFICANT CHANGE UP (ref 22–31)
CREAT SERPL-MCNC: 6.75 MG/DL — HIGH (ref 0.5–1.3)
EGFR: 9 ML/MIN/1.73M2 — LOW
GLUCOSE BLDC GLUCOMTR-MCNC: 112 MG/DL — HIGH (ref 70–99)
GLUCOSE BLDC GLUCOMTR-MCNC: 124 MG/DL — HIGH (ref 70–99)
GLUCOSE BLDC GLUCOMTR-MCNC: 132 MG/DL — HIGH (ref 70–99)
GLUCOSE BLDC GLUCOMTR-MCNC: 162 MG/DL — HIGH (ref 70–99)
GLUCOSE SERPL-MCNC: 237 MG/DL — HIGH (ref 70–99)
HCT VFR BLD CALC: 36.8 % — LOW (ref 39–50)
HGB BLD-MCNC: 11.3 G/DL — LOW (ref 13–17)
INR BLD: 1.19 RATIO — HIGH (ref 0.88–1.16)
MCHC RBC-ENTMCNC: 28.9 PG — SIGNIFICANT CHANGE UP (ref 27–34)
MCHC RBC-ENTMCNC: 30.7 GM/DL — LOW (ref 32–36)
MCV RBC AUTO: 94.1 FL — SIGNIFICANT CHANGE UP (ref 80–100)
NRBC # BLD: 0 /100 WBCS — SIGNIFICANT CHANGE UP (ref 0–0)
NRBC # FLD: 0 K/UL — SIGNIFICANT CHANGE UP (ref 0–0)
PLATELET # BLD AUTO: 601 K/UL — HIGH (ref 150–400)
POTASSIUM SERPL-MCNC: 4.1 MMOL/L — SIGNIFICANT CHANGE UP (ref 3.5–5.3)
POTASSIUM SERPL-SCNC: 4.1 MMOL/L — SIGNIFICANT CHANGE UP (ref 3.5–5.3)
PROTHROM AB SERPL-ACNC: 13.8 SEC — HIGH (ref 10.5–13.4)
RBC # BLD: 3.91 M/UL — LOW (ref 4.2–5.8)
RBC # FLD: 15.8 % — HIGH (ref 10.3–14.5)
RH IG SCN BLD-IMP: POSITIVE — SIGNIFICANT CHANGE UP
SODIUM SERPL-SCNC: 134 MMOL/L — LOW (ref 135–145)
WBC # BLD: 9.88 K/UL — SIGNIFICANT CHANGE UP (ref 3.8–10.5)
WBC # FLD AUTO: 9.88 K/UL — SIGNIFICANT CHANGE UP (ref 3.8–10.5)

## 2023-02-02 PROCEDURE — 99233 SBSQ HOSP IP/OBS HIGH 50: CPT

## 2023-02-02 PROCEDURE — 15275 SKIN SUB GRAFT FACE/NK/HF/G: CPT | Mod: 58,RT

## 2023-02-02 PROCEDURE — 15002 WOUND PREP TRK/ARM/LEG: CPT | Mod: 58,RT

## 2023-02-02 PROCEDURE — 15004 WOUND PREP F/N/HF/G: CPT | Mod: 58,RT

## 2023-02-02 PROCEDURE — 26951 AMPUTATION OF FINGER/THUMB: CPT | Mod: 58,F6

## 2023-02-02 PROCEDURE — 15271 SKIN SUB GRAFT TRNK/ARM/LEG: CPT | Mod: 58,RT

## 2023-02-02 DEVICE — IMPLANTABLE DEVICE: Type: IMPLANTABLE DEVICE | Status: FUNCTIONAL

## 2023-02-02 RX ORDER — HYDROMORPHONE HYDROCHLORIDE 2 MG/ML
0.5 INJECTION INTRAMUSCULAR; INTRAVENOUS; SUBCUTANEOUS
Refills: 0 | Status: DISCONTINUED | OUTPATIENT
Start: 2023-02-02 | End: 2023-02-02

## 2023-02-02 RX ORDER — ALBUMIN HUMAN 25 %
250 VIAL (ML) INTRAVENOUS ONCE
Refills: 0 | Status: COMPLETED | OUTPATIENT
Start: 2023-02-02 | End: 2023-02-02

## 2023-02-02 RX ORDER — MIDODRINE HYDROCHLORIDE 2.5 MG/1
10 TABLET ORAL THREE TIMES A DAY
Refills: 0 | Status: COMPLETED | OUTPATIENT
Start: 2023-02-02 | End: 2023-02-02

## 2023-02-02 RX ORDER — OXYCODONE HYDROCHLORIDE 5 MG/1
5 TABLET ORAL ONCE
Refills: 0 | Status: DISCONTINUED | OUTPATIENT
Start: 2023-02-02 | End: 2023-02-02

## 2023-02-02 RX ORDER — ONDANSETRON 8 MG/1
4 TABLET, FILM COATED ORAL ONCE
Refills: 0 | Status: DISCONTINUED | OUTPATIENT
Start: 2023-02-02 | End: 2023-02-02

## 2023-02-02 RX ADMIN — Medication 975 MILLIGRAM(S): at 20:04

## 2023-02-02 RX ADMIN — CHLORHEXIDINE GLUCONATE 1 APPLICATION(S): 213 SOLUTION TOPICAL at 10:49

## 2023-02-02 RX ADMIN — MIDODRINE HYDROCHLORIDE 10 MILLIGRAM(S): 2.5 TABLET ORAL at 17:05

## 2023-02-02 RX ADMIN — Medication 975 MILLIGRAM(S): at 10:07

## 2023-02-02 RX ADMIN — AMPICILLIN SODIUM AND SULBACTAM SODIUM 200 GRAM(S): 250; 125 INJECTION, POWDER, FOR SUSPENSION INTRAMUSCULAR; INTRAVENOUS at 01:07

## 2023-02-02 RX ADMIN — Medication 125 MILLILITER(S): at 18:46

## 2023-02-02 RX ADMIN — MIDODRINE HYDROCHLORIDE 10 MILLIGRAM(S): 2.5 TABLET ORAL at 18:40

## 2023-02-02 RX ADMIN — DORZOLAMIDE HYDROCHLORIDE TIMOLOL MALEATE 1 DROP(S): 20; 5 SOLUTION/ DROPS OPHTHALMIC at 06:41

## 2023-02-02 RX ADMIN — AMPICILLIN SODIUM AND SULBACTAM SODIUM 200 GRAM(S): 250; 125 INJECTION, POWDER, FOR SUSPENSION INTRAMUSCULAR; INTRAVENOUS at 23:15

## 2023-02-02 RX ADMIN — Medication 975 MILLIGRAM(S): at 20:48

## 2023-02-02 RX ADMIN — Medication 975 MILLIGRAM(S): at 10:47

## 2023-02-02 RX ADMIN — DORZOLAMIDE HYDROCHLORIDE TIMOLOL MALEATE 1 DROP(S): 20; 5 SOLUTION/ DROPS OPHTHALMIC at 17:06

## 2023-02-02 NOTE — PRE-OP CHECKLIST - 2.
right inner thigh gauze and tape dressing right inner thigh gauze and tape dressing, right upper arm AV fistula, SI no skin tear noted

## 2023-02-02 NOTE — PROGRESS NOTE ADULT - PROBLEM SELECTOR PLAN 3
- HkO3w=5.1%. FS well controlled. Continue with ISS for now and continue to monitor FS closely.   - home regimen: Basaglar 5U qhs + Humalog 4U TID qac  - tonight will resume Lantus 12units qhs + hold premeal standing Admelog 4U (FS have been low), SSI qac and hs  - since NPO patient received 7units of Lantus last night and premeal Admelog were help   - CC + Renal restricted diet

## 2023-02-02 NOTE — PROGRESS NOTE ADULT - PROBLEM SELECTOR PLAN 1
Pt anticipated to RTOR for repeat I&D and possible wound vac today 2/2/23.   RCRI = 2 (Class III) connoting 10.1% 30-day risk of major adverse cardiac event.   Pt is without acute cardiac condition and tolerated previous I&Ds (1/14 + 1/16 + 1/19 + 1/25). Patient has no signs or symptoms of ACS/decompensated HF. TTE 1/13 showed EF 62%, nl LV function, mild MS. Medically optimized to proceed to OR.    As workup is unlikely to , pt may RTOR for repeat I&D without further cardiac testing.

## 2023-02-02 NOTE — PROGRESS NOTE ADULT - ASSESSMENT
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 Repeat I+D on 1/14, 1/16, 1/19, and 1/25.    -Repeat I&D, possible wound vac placement tomorrow  -Will likely need to DC with PICC, home abx, and wound vac- will begin coordinating for these to be set up for potential discharge post operatively on Thursday   -Pain control/analgesia  -Non-weight bearing right upper extremity in bulky dressing  -Daily dressing and packing changes QD  -FU nephrology and HD ([facility])  -FU ID recs (zosyn)  -Appreciate Vasc surgery recs  -Appreciate ophthalmology recs: outpatient follow-up  -NPO for OR

## 2023-02-02 NOTE — CHART NOTE - NSCHARTNOTEFT_GEN_A_CORE
ORTHOPEDIC SURGERY POST-OP CHECK    S: Patient seen and examined at bedside POD0 s/p R arm I+D, application Acell and wound vac exchange. Pain well controlled with current regimen. Denies numbness/tingling in the extremity. Denies fever, chills, shortness of breath, and chest pain.     O: T(C): 36.8 (02-02-23 @ 21:00), Max: 37.1 (02-02-23 @ 03:20)  HR: 68 (02-02-23 @ 21:00) (62 - 86)  BP: 117/70 (02-02-23 @ 21:00) (63/33 - 145/53)  RR: 16 (02-02-23 @ 21:00) (12 - 19)  SpO2: 100% (02-02-23 @ 21:00) (98% - 100%)    Exam:   Gen: NAD, resting in bed  Resp: unlabored breathing  RUE: dressing c/d/i, vac in place        +AIN/PIN/U         SILT M/U/R         radial pulse 2+, cap refill <2 sec           02-01-23 @ 07:01  -  02-02-23 @ 07:00  --------------------------------------------------------  IN: 500 mL / OUT: 950 mL / NET: -450 mL          A/P: 47yMale POD0 s/p  R arm I+D, application Acell and wound vac exchange. recovering well  - Pain control  - NWB RUE  - DVT ppx:   - PT/OT  - OOB/AAT  - Regular diet  - Monitor I&Os ORTHOPEDIC SURGERY POST-OP CHECK    S: Patient seen and examined at bedside POD0 s/p R arm I+D, application Acell and wound vac exchange. Pain well controlled with current regimen. Denies numbness/tingling in the extremity. Denies fever, chills, shortness of breath, and chest pain.     O: T(C): 36.8 (02-02-23 @ 21:00), Max: 37.1 (02-02-23 @ 03:20)  HR: 68 (02-02-23 @ 21:00) (62 - 86)  BP: 117/70 (02-02-23 @ 21:00) (63/33 - 145/53)  RR: 16 (02-02-23 @ 21:00) (12 - 19)  SpO2: 100% (02-02-23 @ 21:00) (98% - 100%)    Exam:   Gen: NAD, resting in bed  Resp: unlabored breathing  RUE: dressing c/d/i, vac in place        Moving fingers        Tenderness distal tips of fingers especially the thumb, SILT        Rest of hand WWP        No pain proximally           02-01-23 @ 07:01  -  02-02-23 @ 07:00  --------------------------------------------------------  IN: 500 mL / OUT: 950 mL / NET: -450 mL          A/P: 47yMale POD0 s/p  R arm I+D, application Acell and wound vac exchange. recovering well  - Pain control  - NWB RUE  - DVT ppx:   - PT/OT  - OOB/AAT  - Regular diet  - Monitor I&Os

## 2023-02-02 NOTE — PROGRESS NOTE ADULT - SUBJECTIVE AND OBJECTIVE BOX
CHIEF COMPLAINT: f/u steal syndrome    SUBJECTIVE / OVERNIGHT EVENTS: Patient seen and examined. No acute events overnight, patient NPO. Pain well controlled and patient without any complaints. Ready for procedure today.    MEDICATIONS  (STANDING):  acetaminophen     Tablet .. 975 milliGRAM(s) Oral every 8 hours  ampicillin/sulbactam  IVPB 3 Gram(s) IV Intermittent every 24 hours  aspirin  chewable 81 milliGRAM(s) Oral daily  chlorhexidine 2% Cloths 1 Application(s) Topical <User Schedule>  dorzolamide 2%/timolol 0.5% Ophthalmic Solution 1 Drop(s) Both EYES two times a day  epoetin deidre-epbx (RETACRIT) Injectable 67376 Unit(s) IV Push <User Schedule>  insulin lispro (ADMELOG) corrective regimen sliding scale   SubCutaneous three times a day before meals  insulin lispro (ADMELOG) corrective regimen sliding scale   SubCutaneous at bedtime  midodrine. 10 milliGRAM(s) Oral three times a day  multivitamin 1 Tablet(s) Oral daily    MEDICATIONS  (PRN):  bisacodyl Suppository 10 milliGRAM(s) Rectal daily PRN If no bowel movement  HYDROmorphone   Tablet 2 milliGRAM(s) Oral every 4 hours PRN Mild Pain (1 - 3)  HYDROmorphone   Tablet 4 milliGRAM(s) Oral every 4 hours PRN Moderate Pain (4 - 6)  HYDROmorphone   Tablet 8 milliGRAM(s) Oral every 4 hours PRN Severe Pain (7 - 10)  lactulose Syrup 10 Gram(s) Oral daily PRN Constipation      VITALS:  T(F): 98.8 (02-02-23 @ 10:00), Max: 98.8 (02-02-23 @ 03:20)  HR: 70 (02-02-23 @ 10:00) (61 - 72)  BP: 119/33 (02-02-23 @ 10:00) (119/33 - 145/53)  RR: 17 (02-02-23 @ 10:00) (16 - 17)  SpO2: 100% (02-02-23 @ 10:00)  Wt(kg): --  Height (cm): 170.2 (03:20)  Weight (kg): 65.8 (03:20)  BMI (kg/m2): 22.7 (03:20)    PHYSICAL EXAM:  CONSTITUTIONAL: NAD, well-developed, well-groomed  RESPIRATORY: Normal respiratory effort; lungs are clear to auscultation bilaterally  CARDIOVASCULAR: Regular rate and rhythm, normal S1 and S2, no murmur/rub/gallop; No lower extremity edema  GASTROINTESTINAL: Nontender to palpation, normoactive bowel sounds, no rebound/guarding; No hepatosplenomegaly  MUSCULOSKELETAL:  no clubbing or cyanosis of digits; no joint swelling or tenderness to palpation, R hand wrapped (s/p amputation of third finger)  SKIN: No rashes; warm, surgical site C/D/I    LABS:              11.3                 134  | 27   | 34           9.88  >-----------< 601     ------------------------< 237                   36.8                 4.1  | 94   | 6.75                                         Ca 9.4   Mg x     Ph x        INR: 1.19 ratio<H>;    PT: 13.8 sec<H>;    PTT: 30.4 sec    CAPILLARY BLOOD GLUCOSE  POCT Blood Glucose.: 124 mg/dL (02 Feb 2023 11:07)  POCT Blood Glucose.: 132 mg/dL (02 Feb 2023 05:58)  POCT Blood Glucose.: 137 mg/dL (01 Feb 2023 21:18)  POCT Blood Glucose.: 122 mg/dL (01 Feb 2023 16:40)      RADIOLOGY & ADDITIONAL TESTS:  Imaging Personally Reviewed: [x] Yes    [ ] Consultant(s) Notes Reviewed:  [x] Care Discussed with Consultants/Other Providers: Orthopedic PA - discussed plan for OR today

## 2023-02-02 NOTE — PROGRESS NOTE ADULT - NUTRITIONAL ASSESSMENT
MEDICATIONS  (STANDING):  acetaminophen     Tablet .. 975 milliGRAM(s) Oral every 8 hours  ampicillin/sulbactam  IVPB 3 Gram(s) IV Intermittent every 24 hours  aspirin  chewable 81 milliGRAM(s) Oral daily  chlorhexidine 2% Cloths 1 Application(s) Topical <User Schedule>  dorzolamide 2%/timolol 0.5% Ophthalmic Solution 1 Drop(s) Both EYES two times a day  epoetin deidre-epbx (RETACRIT) Injectable 46986 Unit(s) IV Push <User Schedule>  insulin glargine Injectable (LANTUS) 12 Unit(s) SubCutaneous at bedtime  insulin lispro (ADMELOG) corrective regimen sliding scale   SubCutaneous three times a day before meals  insulin lispro (ADMELOG) corrective regimen sliding scale   SubCutaneous at bedtime  midodrine. 10 milliGRAM(s) Oral three times a day  multivitamin 1 Tablet(s) Oral daily

## 2023-02-02 NOTE — PHYSICAL EXAM
[de-identified] : R UE:\par Nylon sutures intact on dorsal and volar aspect of forearm/wrist/hand. Exposed tendons on volar aspect of forearm. \par 3rd ray stump clean with dried blood, no signs of infection\par Moving fingers. Tenderness distal tips of fingers especially the thumb, SILT\par Rest of hand WWP. \par No pain proximally\par No draining purulence, minimal appropriate jennifer-incisional erythema \par

## 2023-02-02 NOTE — PROGRESS NOTE ADULT - SUBJECTIVE AND OBJECTIVE BOX
Patient is a 47y Male whom presented to the hospital with esrd on hd     PAST MEDICAL & SURGICAL HISTORY:      MEDICATIONS  (STANDING):  dextrose 5%. 1000 milliLiter(s) (100 mL/Hr) IV Continuous <Continuous>  dextrose 5%. 1000 milliLiter(s) (50 mL/Hr) IV Continuous <Continuous>  dextrose 50% Injectable 25 Gram(s) IV Push once  dextrose 50% Injectable 25 Gram(s) IV Push once  dextrose 50% Injectable 12.5 Gram(s) IV Push once  glucagon  Injectable 1 milliGRAM(s) IntraMuscular once  insulin lispro (ADMELOG) corrective regimen sliding scale   SubCutaneous every 6 hours  pantoprazole    Tablet 40 milliGRAM(s) Oral daily  polyethylene glycol 3350 17 Gram(s) Oral daily  senna 2 Tablet(s) Oral at bedtime      Allergies    No Known Allergies    Intolerances        SOCIAL HISTORY:  Denies ETOh,Smoking,     FAMILY HISTORY:      REVIEW OF SYSTEMS:    CONSTITUTIONAL: No weakness, fevers or chills  NECK: No pain or stiffness  RESPIRATORY: No cough, wheezing, hemoptysis; No shortness of breath  CARDIOVASCULAR: No chest pain or palpitations  GASTROINTESTINAL: No abdominal or epigastric pain. No nausea, vomiting,                                                         11.3   9.88  )-----------( 601      ( 02 Feb 2023 00:50 )             36.8       CBC Full  -  ( 02 Feb 2023 00:50 )  WBC Count : 9.88 K/uL  RBC Count : 3.91 M/uL  Hemoglobin : 11.3 g/dL  Hematocrit : 36.8 %  Platelet Count - Automated : 601 K/uL  Mean Cell Volume : 94.1 fL  Mean Cell Hemoglobin : 28.9 pg  Mean Cell Hemoglobin Concentration : 30.7 gm/dL  Auto Neutrophil # : x  Auto Lymphocyte # : x  Auto Monocyte # : x  Auto Eosinophil # : x  Auto Basophil # : x  Auto Neutrophil % : x  Auto Lymphocyte % : x  Auto Monocyte % : x  Auto Eosinophil % : x  Auto Basophil % : x      02-02    134<L>  |  94<L>  |  34<H>  ----------------------------<  237<H>  4.1   |  27  |  6.75<H>    Ca    9.4      02 Feb 2023 00:50        CAPILLARY BLOOD GLUCOSE      POCT Blood Glucose.: 112 mg/dL (02 Feb 2023 16:15)  POCT Blood Glucose.: 124 mg/dL (02 Feb 2023 11:07)  POCT Blood Glucose.: 132 mg/dL (02 Feb 2023 05:58)  POCT Blood Glucose.: 137 mg/dL (01 Feb 2023 21:18)      Vital Signs Last 24 Hrs  T(C): 36.4 (02 Feb 2023 18:00), Max: 37.1 (02 Feb 2023 03:20)  T(F): 97.5 (02 Feb 2023 18:00), Max: 98.8 (02 Feb 2023 03:20)  HR: 71 (02 Feb 2023 19:30) (62 - 86)  BP: 76/56 (02 Feb 2023 19:30) (63/33 - 145/53)  BP(mean): 62 (02 Feb 2023 19:30) (38 - 69)  RR: 15 (02 Feb 2023 19:30) (12 - 19)  SpO2: 99% (02 Feb 2023 19:30) (98% - 100%)    Parameters below as of 02 Feb 2023 16:30  Patient On (Oxygen Delivery Method): room air            PT/INR - ( 02 Feb 2023 00:50 )   PT: 13.8 sec;   INR: 1.19 ratio         PTT - ( 02 Feb 2023 00:50 )  PTT:30.4 sec    PHYSICAL EXAM:    Constitutional: NAD  HEENT: conjunctive   clear   Neck:  No JVD  Respiratory: CTAB  Cardiovascular: S1 and S2  Gastrointestinal: BS+, soft, NT/ND   right hand dressed. bilateral BKA   right arm AVF nontender

## 2023-02-02 NOTE — ASSESSMENT
[FreeTextEntry1] : 48 y/o male presenting 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 Repeat I+D on 1/14, 1/16, 1/19, and 1/25. At this time, the patient is recommended for a RUE I&D, wound vac change and placement of dermal substitute. All questions and concerns were addressed with the patient and they are in agreement with this plan. The patient will be scheduled for 2/9/2023. \par

## 2023-02-02 NOTE — PROGRESS NOTE ADULT - ASSESSMENT
Patient is a 46 y/o male PMH DM2, Bilateral BKA, ESRD (on HD MWF), last dialyzed yesterday transferred from [facility] emergently for evaluation of evaluation of right finger swelling/pain. Patient reports history of right finger pain after he had a fall one year ago. Patient had a wound on her second/middle finger who he was seeing a hand surgeon for outpatient but unable ultimately to continue seeing due to insurance issues. Patient reports his middle finger developed increased swelling and became black in color about two weeks ago. Denies fevers. Patient transferred to [facility] for further evaluation and emergent OR. Patient received broad spectrum Abx of Zosyn/vanco/clindamycin at [facility]. Interpretor phone used for translation with patient. ID# [id number]         septic workup and ID evaluation,send blood and urine cx,serial lactate levels,monitor vitals closley,ivfs hydration,monitor urine output and renal profile,iv abx as per id cons  ampicillin/sulbactam  IVPB 3 Gram(s) IV Intermittent every 24 hours    esrd on hd   Excess fluids and waste products will be removed from your blood; your electrolytes will be balanced; your blood pressure will be controlled.    BP monitoring,continue current antihypertensive meds, low salt diet,followup with PMD in 1-2 weeks      ANEMIA PLAN:  Anemia of chronic disease:  Well controlled by epo  H and H subtherapeutic .  We will continue epo aiming for a HCT of 32-36 %.   We will monitor Iron stores, B12 and RBC folate .  epoetin deidre-epbx (RETACRIT) Injectable 00800 Unit(s) IV Push     Accuchecks monitoring and insulin sliding scale coverage, no concentrated sweets diet, serial labs and f/up with PMD, monitor HB A 1 C every 3-4 mnth  piperacillin/tazobactam IVPB.. 3.375 Gram(s) IV Intermittent every 12 hours

## 2023-02-02 NOTE — HISTORY OF PRESENT ILLNESS
[FreeTextEntry1] : Date of Surgery:  1/14, 1/16, 1/19, and 1/25\par Procedure: Right 3rd finger amputation at metacarpal head, hand I&D and CTR ; with subsequent repeat I&Ds \par \par Date of Surgery: 02/02/2023 \par Procedure: repeat I&D with wound vac placement \par \par 46 y/o male with a PMH of ESRD on HD, PVD s/p bilateral BKA, and T2DM presented with right hand numbness and tingling, and blackening of the 2-3rd digits since January 2023. The patient was admitted to [facility] where he underwent above procedures. The patient was also treated with Zosyn. \par

## 2023-02-02 NOTE — CHART NOTE - NSCHARTNOTEFT_GEN_A_CORE
Surgery concerned for possible associated osteomyelitis.   In this case can extend course to 6 weeks ending 2/21.   Unasyn while here, Augment if discharged.   I'm not actively following. Please call back if further eval needed or questions.     Discussed with ortho     Tr Alvarez MD   Infectious Disease   Available on TEAMS. After 5PM and on weekends please page fellow on call or call [phone]

## 2023-02-02 NOTE — PROGRESS NOTE ADULT - SUBJECTIVE AND OBJECTIVE BOX
Orthopedics      Patient seen and examined at bedside. Feeling well. Pain controlled. NPO for OR today.    Vital Signs Last 24 Hrs  T(C): 36.8 (02-02-23 @ 06:36), Max: 36.8 (02-01-23 @ 14:15)  T(F): 98.3 (02-02-23 @ 06:36), Max: 98.3 (02-02-23 @ 06:36)  HR: 62 (02-02-23 @ 06:36) (61 - 72)  BP: 145/53 (02-02-23 @ 06:36) (121/58 - 145/53)  BP(mean): --  RR: 17 (02-02-23 @ 06:36) (16 - 18)  SpO2: 100% (02-02-23 @ 06:36) (98% - 100%)      PT/INR - ( 02 Feb 2023 00:50 )   PT: 13.8 sec;   INR: 1.19 ratio         PTT - ( 02 Feb 2023 00:50 )  PTT:30.4 sec    PHYSICAL EXAM:  Resp: Nonlabored  R UE:  Nylon sutures intact on dorsal and volar aspect of forearm/wrist/hand  Exposed tendons on volar aspect of forearm  3rd ray stump clean with dried blood, no signs of infection  Moving fingers  Tenderness distal tips of fingers especially the thumb, SILT  Rest of hand WWP  No pain proximally  No draining purulence, minimal appropriate jennifer-incisional erythema

## 2023-02-02 NOTE — PRE-OP CHECKLIST - 5.
elvira Chicas f.s 124//// see flowsheet for preop vitals see flowsheet for preop vitals// report to Dianna HERNANDEZ at 1123

## 2023-02-02 NOTE — PROGRESS NOTE ADULT - ASSESSMENT
47M with hx of ESRD on HD, PVD s/p bilateral BKA, T2DM who initially presented to [facility] with right finger swelling and pain, found to have steal syndrome transferred to [facility] for further management. s/p revision of AVF with vascular surgery 1/13, and s/p right 3rd finger amputation and repeated I&D with ortho (1/14 + 1/16 + 1/19 +1/25) s/p SICU stay for hemorrhagic show now back on floors with plan to RTOR today for 5th I&D.

## 2023-02-03 ENCOUNTER — TRANSCRIPTION ENCOUNTER (OUTPATIENT)
Age: 48
End: 2023-02-03

## 2023-02-03 LAB
ANION GAP SERPL CALC-SCNC: 19 MMOL/L — HIGH (ref 7–14)
BUN SERPL-MCNC: 46 MG/DL — HIGH (ref 7–23)
CALCIUM SERPL-MCNC: 8.3 MG/DL — LOW (ref 8.4–10.5)
CHLORIDE SERPL-SCNC: 94 MMOL/L — LOW (ref 98–107)
CO2 SERPL-SCNC: 22 MMOL/L — SIGNIFICANT CHANGE UP (ref 22–31)
CREAT SERPL-MCNC: 8.74 MG/DL — HIGH (ref 0.5–1.3)
EGFR: 7 ML/MIN/1.73M2 — LOW
GLUCOSE BLDC GLUCOMTR-MCNC: 132 MG/DL — HIGH (ref 70–99)
GLUCOSE BLDC GLUCOMTR-MCNC: 136 MG/DL — HIGH (ref 70–99)
GLUCOSE BLDC GLUCOMTR-MCNC: 187 MG/DL — HIGH (ref 70–99)
GLUCOSE BLDC GLUCOMTR-MCNC: 204 MG/DL — HIGH (ref 70–99)
GLUCOSE BLDC GLUCOMTR-MCNC: 242 MG/DL — HIGH (ref 70–99)
GLUCOSE SERPL-MCNC: 193 MG/DL — HIGH (ref 70–99)
HCT VFR BLD CALC: 23.9 % — LOW (ref 39–50)
HCT VFR BLD CALC: 27.7 % — LOW (ref 39–50)
HGB BLD-MCNC: 7.4 G/DL — LOW (ref 13–17)
HGB BLD-MCNC: 8.4 G/DL — LOW (ref 13–17)
MCHC RBC-ENTMCNC: 28.6 PG — SIGNIFICANT CHANGE UP (ref 27–34)
MCHC RBC-ENTMCNC: 28.8 PG — SIGNIFICANT CHANGE UP (ref 27–34)
MCHC RBC-ENTMCNC: 30.3 GM/DL — LOW (ref 32–36)
MCHC RBC-ENTMCNC: 31 GM/DL — LOW (ref 32–36)
MCV RBC AUTO: 93 FL — SIGNIFICANT CHANGE UP (ref 80–100)
MCV RBC AUTO: 94.2 FL — SIGNIFICANT CHANGE UP (ref 80–100)
NRBC # BLD: 0 /100 WBCS — SIGNIFICANT CHANGE UP (ref 0–0)
NRBC # BLD: 0 /100 WBCS — SIGNIFICANT CHANGE UP (ref 0–0)
NRBC # FLD: 0 K/UL — SIGNIFICANT CHANGE UP (ref 0–0)
NRBC # FLD: 0 K/UL — SIGNIFICANT CHANGE UP (ref 0–0)
PLATELET # BLD AUTO: 414 K/UL — HIGH (ref 150–400)
PLATELET # BLD AUTO: 457 K/UL — HIGH (ref 150–400)
POTASSIUM SERPL-MCNC: 4.8 MMOL/L — SIGNIFICANT CHANGE UP (ref 3.5–5.3)
POTASSIUM SERPL-SCNC: 4.8 MMOL/L — SIGNIFICANT CHANGE UP (ref 3.5–5.3)
RBC # BLD: 2.57 M/UL — LOW (ref 4.2–5.8)
RBC # BLD: 2.94 M/UL — LOW (ref 4.2–5.8)
RBC # FLD: 15.5 % — HIGH (ref 10.3–14.5)
RBC # FLD: 15.5 % — HIGH (ref 10.3–14.5)
SARS-COV-2 RNA SPEC QL NAA+PROBE: SIGNIFICANT CHANGE UP
SODIUM SERPL-SCNC: 135 MMOL/L — SIGNIFICANT CHANGE UP (ref 135–145)
WBC # BLD: 6.5 K/UL — SIGNIFICANT CHANGE UP (ref 3.8–10.5)
WBC # BLD: 7.41 K/UL — SIGNIFICANT CHANGE UP (ref 3.8–10.5)
WBC # FLD AUTO: 6.5 K/UL — SIGNIFICANT CHANGE UP (ref 3.8–10.5)
WBC # FLD AUTO: 7.41 K/UL — SIGNIFICANT CHANGE UP (ref 3.8–10.5)

## 2023-02-03 RX ORDER — OXYCODONE HYDROCHLORIDE 5 MG/1
1 TABLET ORAL
Qty: 20 | Refills: 0
Start: 2023-02-03

## 2023-02-03 RX ORDER — OXYCODONE HYDROCHLORIDE 5 MG/1
5 TABLET ORAL EVERY 4 HOURS
Refills: 0 | Status: DISCONTINUED | OUTPATIENT
Start: 2023-02-03 | End: 2023-02-09

## 2023-02-03 RX ORDER — OXYCODONE HYDROCHLORIDE 5 MG/1
10 TABLET ORAL EVERY 4 HOURS
Refills: 0 | Status: DISCONTINUED | OUTPATIENT
Start: 2023-02-03 | End: 2023-02-10

## 2023-02-03 RX ORDER — ASPIRIN/CALCIUM CARB/MAGNESIUM 324 MG
1 TABLET ORAL
Qty: 30 | Refills: 0
Start: 2023-02-03

## 2023-02-03 RX ORDER — ACETAMINOPHEN 500 MG
3 TABLET ORAL
Qty: 0 | Refills: 0 | DISCHARGE
Start: 2023-02-03

## 2023-02-03 RX ADMIN — Medication 975 MILLIGRAM(S): at 22:05

## 2023-02-03 RX ADMIN — DORZOLAMIDE HYDROCHLORIDE TIMOLOL MALEATE 1 DROP(S): 20; 5 SOLUTION/ DROPS OPHTHALMIC at 05:37

## 2023-02-03 RX ADMIN — Medication 975 MILLIGRAM(S): at 05:37

## 2023-02-03 RX ADMIN — DORZOLAMIDE HYDROCHLORIDE TIMOLOL MALEATE 1 DROP(S): 20; 5 SOLUTION/ DROPS OPHTHALMIC at 21:50

## 2023-02-03 RX ADMIN — CHLORHEXIDINE GLUCONATE 1 APPLICATION(S): 213 SOLUTION TOPICAL at 13:32

## 2023-02-03 RX ADMIN — HYDROMORPHONE HYDROCHLORIDE 4 MILLIGRAM(S): 2 INJECTION INTRAMUSCULAR; INTRAVENOUS; SUBCUTANEOUS at 00:21

## 2023-02-03 RX ADMIN — Medication 975 MILLIGRAM(S): at 21:51

## 2023-02-03 RX ADMIN — MIDODRINE HYDROCHLORIDE 10 MILLIGRAM(S): 2.5 TABLET ORAL at 05:37

## 2023-02-03 RX ADMIN — ERYTHROPOIETIN 10000 UNIT(S): 10000 INJECTION, SOLUTION INTRAVENOUS; SUBCUTANEOUS at 07:44

## 2023-02-03 RX ADMIN — Medication 975 MILLIGRAM(S): at 13:30

## 2023-02-03 RX ADMIN — HYDROMORPHONE HYDROCHLORIDE 4 MILLIGRAM(S): 2 INJECTION INTRAMUSCULAR; INTRAVENOUS; SUBCUTANEOUS at 01:05

## 2023-02-03 RX ADMIN — Medication 2: at 16:47

## 2023-02-03 RX ADMIN — AMPICILLIN SODIUM AND SULBACTAM SODIUM 200 GRAM(S): 250; 125 INJECTION, POWDER, FOR SUSPENSION INTRAMUSCULAR; INTRAVENOUS at 23:03

## 2023-02-03 RX ADMIN — MIDODRINE HYDROCHLORIDE 10 MILLIGRAM(S): 2.5 TABLET ORAL at 13:30

## 2023-02-03 RX ADMIN — Medication 81 MILLIGRAM(S): at 13:30

## 2023-02-03 NOTE — PROGRESS NOTE ADULT - ASSESSMENT
Patient is a 46 y/o male PMH DM2, Bilateral BKA, ESRD (on HD MWF), last dialyzed yesterday transferred from [facility] emergently for evaluation of evaluation of right finger swelling/pain. Patient reports history of right finger pain after he had a fall one year ago. Patient had a wound on her second/middle finger who he was seeing a hand surgeon for outpatient but unable ultimately to continue seeing due to insurance issues. Patient reports his middle finger developed increased swelling and became black in color about two weeks ago. Denies fevers. Patient transferred to [facility] for further evaluation and emergent OR. Patient received broad spectrum Abx of Zosyn/vanco/clindamycin at [facility]. Interpretor phone used for translation with patient. ID# [id number]         septic workup and ID evaluation,send blood and urine cx,serial lactate levels,monitor vitals closley,ivfs hydration,monitor urine output and renal profile,iv abx as per id cons  ampicillin/sulbactam  IVPB 3 Gram(s) IV Intermittent every 24 hours    esrd on hd   Excess fluids and waste products will be removed from your blood; your electrolytes will be balanced; your blood pressure will be controlled.    BP monitoring,continue current antihypertensive meds, low salt diet,followup with PMD in 1-2 weeks      ANEMIA PLAN:  Anemia of chronic disease:  Well controlled by epo  H and H subtherapeutic .  We will continue epo aiming for a HCT of 32-36 %.   We will monitor Iron stores, B12 and RBC folate .  epoetin deidre-epbx (RETACRIT) Injectable 61457 Unit(s) IV Push     Accuchecks monitoring and insulin sliding scale coverage, no concentrated sweets diet, serial labs and f/up with PMD, monitor HB A 1 C every 3-4 mnth  piperacillin/tazobactam IVPB.. 3.375 Gram(s) IV Intermittent every 12 hours

## 2023-02-03 NOTE — DISCHARGE NOTE NURSING/CASE MANAGEMENT/SOCIAL WORK - NSDCPNDISPN_GEN_ALL_CORE
Activities of daily living, including home environment that might     exacerbate pain or reduce effectiveness of the pain management plan of care as well as strategies to address these issues Activities of daily living, including home environment that might     exacerbate pain or reduce effectiveness of the pain management plan of care as well as strategies to address these issues/Opioids not applicable/not prescribed Education provided on the pain management plan of care/Side effects of pain management treatment/Activities of daily living, including home environment that might     exacerbate pain or reduce effectiveness of the pain management plan of care as well as strategies to address these issues/Safe use, storage and disposal of opioids when prescribed

## 2023-02-03 NOTE — PROGRESS NOTE ADULT - SUBJECTIVE AND OBJECTIVE BOX
Patient is a 47y Male whom presented to the hospital with esrd on hd     PAST MEDICAL & SURGICAL HISTORY:      MEDICATIONS  (STANDING):  dextrose 5%. 1000 milliLiter(s) (100 mL/Hr) IV Continuous <Continuous>  dextrose 5%. 1000 milliLiter(s) (50 mL/Hr) IV Continuous <Continuous>  dextrose 50% Injectable 25 Gram(s) IV Push once  dextrose 50% Injectable 25 Gram(s) IV Push once  dextrose 50% Injectable 12.5 Gram(s) IV Push once  glucagon  Injectable 1 milliGRAM(s) IntraMuscular once  insulin lispro (ADMELOG) corrective regimen sliding scale   SubCutaneous every 6 hours  pantoprazole    Tablet 40 milliGRAM(s) Oral daily  polyethylene glycol 3350 17 Gram(s) Oral daily  senna 2 Tablet(s) Oral at bedtime      Allergies    No Known Allergies    Intolerances        SOCIAL HISTORY:  Denies ETOh,Smoking,     FAMILY HISTORY:      REVIEW OF SYSTEMS:    CONSTITUTIONAL: No weakness, fevers or chills  NECK: No pain or stiffness  RESPIRATORY: No cough, wheezing, hemoptysis; No shortness of breath  CARDIOVASCULAR: No chest pain or palpitations  GASTROINTESTINAL: No abdominal or epigastric pain. No nausea, vomiting,                                                       8.4    6.50  )-----------( 457      ( 03 Feb 2023 11:14 )             27.7       CBC Full  -  ( 03 Feb 2023 11:14 )  WBC Count : 6.50 K/uL  RBC Count : 2.94 M/uL  Hemoglobin : 8.4 g/dL  Hematocrit : 27.7 %  Platelet Count - Automated : 457 K/uL  Mean Cell Volume : 94.2 fL  Mean Cell Hemoglobin : 28.6 pg  Mean Cell Hemoglobin Concentration : 30.3 gm/dL  Auto Neutrophil # : x  Auto Lymphocyte # : x  Auto Monocyte # : x  Auto Eosinophil # : x  Auto Basophil # : x  Auto Neutrophil % : x  Auto Lymphocyte % : x  Auto Monocyte % : x  Auto Eosinophil % : x  Auto Basophil % : x      02-03    135  |  94<L>  |  46<H>  ----------------------------<  193<H>  4.8   |  22  |  8.74<H>    Ca    8.3<L>      03 Feb 2023 06:20        CAPILLARY BLOOD GLUCOSE      POCT Blood Glucose.: 242 mg/dL (03 Feb 2023 16:42)  POCT Blood Glucose.: 136 mg/dL (03 Feb 2023 11:27)  POCT Blood Glucose.: 132 mg/dL (03 Feb 2023 09:09)  POCT Blood Glucose.: 204 mg/dL (03 Feb 2023 05:44)  POCT Blood Glucose.: 162 mg/dL (02 Feb 2023 22:01)      Vital Signs Last 24 Hrs  T(C): 36.2 (03 Feb 2023 17:53), Max: 37.1 (03 Feb 2023 10:31)  T(F): 97.2 (03 Feb 2023 17:53), Max: 98.8 (03 Feb 2023 10:31)  HR: 73 (03 Feb 2023 17:53) (61 - 76)  BP: 106/55 (03 Feb 2023 17:55) (83/50 - 139/108)  BP(mean): 85 (02 Feb 2023 21:00) (61 - 86)  RR: 18 (03 Feb 2023 17:53) (14 - 18)  SpO2: 100% (03 Feb 2023 17:53) (97% - 100%)    Parameters below as of 03 Feb 2023 17:53  Patient On (Oxygen Delivery Method): room air            PT/INR - ( 02 Feb 2023 00:50 )   PT: 13.8 sec;   INR: 1.19 ratio         PTT - ( 02 Feb 2023 00:50 )  PTT:30.4 sec  PHYSICAL EXAM:    Constitutional: NAD  HEENT: conjunctive   clear   Neck:  No JVD  Respiratory: CTAB  Cardiovascular: S1 and S2  Gastrointestinal: BS+, soft, NT/ND   right hand dressed. bilateral BKA   right arm AVF nontender

## 2023-02-03 NOTE — PROVIDER CONTACT NOTE (OTHER) - RECOMMENDATIONS
Patient is alert and oriented X 4. Patient wants 2.5  hours HD TX today.
will continue to monitor the patient
provider states to give 4 units premeal after pt eats
Dr. Jenkins advised against pressors at this time.  Recommended terminating treatment due to low blood pressure.

## 2023-02-03 NOTE — PROVIDER CONTACT NOTE (OTHER) - REASON
Blood pressure low
FS 78 and patient ordered for 4 units premeal insulin
unable to remove the desired fluid goal
patient requested and wants 2.5 hours HD TX today 01/12/2023. Pt is alert and oriented X 4 and able to verbalize.

## 2023-02-03 NOTE — DISCHARGE NOTE NURSING/CASE MANAGEMENT/SOCIAL WORK - NSSCTYPOFSERV_GEN_ALL_CORE
Nurse to see for start of care on Saturday 2/25/23; the nurse will contact you to arrange time of visit.

## 2023-02-03 NOTE — PROGRESS NOTE ADULT - SUBJECTIVE AND OBJECTIVE BOX
ORTHO POC    Patient resting comfortably without complaint.  Vac delivered to room.       Vital Signs Last 24 Hrs  T(C): 36.8 (03 Feb 2023 13:45), Max: 37.1 (03 Feb 2023 10:31)  T(F): 98.2 (03 Feb 2023 13:45), Max: 98.8 (03 Feb 2023 10:31)  HR: 61 (03 Feb 2023 13:45) (61 - 86)  BP: 112/60 (03 Feb 2023 13:45) (63/33 - 139/108)  BP(mean): 85 (02 Feb 2023 21:00) (38 - 86)  RR: 18 (03 Feb 2023 13:45) (12 - 19)  SpO2: 97% (03 Feb 2023 13:45) (97% - 100%)    Parameters below as of 03 Feb 2023 13:45  Patient On (Oxygen Delivery Method): room air          PHYSICAL EXAM:  Resp: Nonlabored  R UE: dressing C/D/I                               8.4    6.50  )-----------( 457      ( 03 Feb 2023 11:14 )             27.7         02-03    135  |  94<L>  |  46<H>  ----------------------------<  193<H>  4.8   |  22  |  8.74<H>    Ca    8.3<L>      03 Feb 2023 06:20          A/P Stable postop           PT- nonweight bearing right upper extremity          Pain control          DVT prophylaxis- ASA daily/ venodynes           DC planning  ORTHO POC    Patient resting comfortably without complaint.  Vac delivered to room.       Vital Signs Last 24 Hrs  T(C): 36.8 (03 Feb 2023 13:45), Max: 37.1 (03 Feb 2023 10:31)  T(F): 98.2 (03 Feb 2023 13:45), Max: 98.8 (03 Feb 2023 10:31)  HR: 61 (03 Feb 2023 13:45) (61 - 86)  BP: 112/60 (03 Feb 2023 13:45) (63/33 - 139/108)  BP(mean): 85 (02 Feb 2023 21:00) (38 - 86)  RR: 18 (03 Feb 2023 13:45) (12 - 19)  SpO2: 97% (03 Feb 2023 13:45) (97% - 100%)    Parameters below as of 03 Feb 2023 13:45  Patient On (Oxygen Delivery Method): room air          PHYSICAL EXAM:  Resp: Nonlabored  R UE: dressing C/D/I                               8.4    6.50  )-----------( 457      ( 03 Feb 2023 11:14 )             27.7         02-03    135  |  94<L>  |  46<H>  ----------------------------<  193<H>  4.8   |  22  |  8.74<H>    Ca    8.3<L>      03 Feb 2023 06:20          A/P Stable postop           PT- nonweight bearing right upper extremity          Pain control          DVT prophylaxis- ASA daily/ venodynes           OR planning for 2/7

## 2023-02-03 NOTE — PROGRESS NOTE ADULT - NUTRITIONAL ASSESSMENT
MEDICATIONS  (STANDING):  acetaminophen     Tablet .. 975 milliGRAM(s) Oral every 8 hours  ampicillin/sulbactam  IVPB 3 Gram(s) IV Intermittent every 24 hours  aspirin  chewable 81 milliGRAM(s) Oral daily  chlorhexidine 2% Cloths 1 Application(s) Topical <User Schedule>  dorzolamide 2%/timolol 0.5% Ophthalmic Solution 1 Drop(s) Both EYES two times a day  epoetin deidre-epbx (RETACRIT) Injectable 48428 Unit(s) IV Push <User Schedule>  insulin glargine Injectable (LANTUS) 12 Unit(s) SubCutaneous at bedtime  insulin lispro (ADMELOG) corrective regimen sliding scale   SubCutaneous three times a day before meals  insulin lispro (ADMELOG) corrective regimen sliding scale   SubCutaneous at bedtime  midodrine. 10 milliGRAM(s) Oral three times a day  multivitamin 1 Tablet(s) Oral daily

## 2023-02-03 NOTE — PROVIDER CONTACT NOTE (OTHER) - ASSESSMENT
pt states he will eat his whole lunch
BP - 105/54 mmhg, HR- 72 bpm. Afebrile. alert and oriented x 4 and able to verbalize.
no sob , no edema noted at present
Pt is A&O4, at this time.

## 2023-02-03 NOTE — DISCHARGE NOTE NURSING/CASE MANAGEMENT/SOCIAL WORK - NSDCPNINST_GEN_ALL_CORE
Notify Dr Sin if you experience any increase in pain not relieved with medication, any bright red blood in vac, if vac dressing becomes dislodged or any fever >100.5.  Continue to follow renal diabetic diet.   Notify Dr Sin if you experience any increase in pain not relieved with medication, any bright red blood in vac, if vac dressing becomes dislodged or any fever >100.5.  Continue to follow renal diabetic diet.  Your A1C= 7.1. Continue to follow a consistent carbohydrate diet and take your medications for diabetes.

## 2023-02-03 NOTE — DISCHARGE NOTE NURSING/CASE MANAGEMENT/SOCIAL WORK - NSDCPEFALRISK_GEN_ALL_CORE
For information on Fall & Injury Prevention, visit: [email or web address] OR  [email or web address] OR  https://www.cdc.gov/steadi/patient.html

## 2023-02-03 NOTE — DISCHARGE NOTE NURSING/CASE MANAGEMENT/SOCIAL WORK - PATIENT PORTAL LINK FT
You can access the FollowMyHealth Patient Portal offered by [facility] by registering at the following website: [email or web address]. By joining [facility]’s FollowMyHealth portal, you will also be able to view your health information using other applications (apps) compatible with our system.

## 2023-02-03 NOTE — PROVIDER CONTACT NOTE (OTHER) - SITUATION
Pt's blood pressure was low during dialysis  Pt. also bleeding from his right hand at this time.
Patient requested and wants 2.5 hours HD TX today 01/12/2023. Pt is alert and oriented X 4 and able to verbalize.
pt FS 78, patient ordered for 4 units premeal insulin.
patients blood pressure on the low side , and unable to remove the desired fluid

## 2023-02-04 LAB
GLUCOSE BLDC GLUCOMTR-MCNC: 153 MG/DL — HIGH (ref 70–99)
GLUCOSE BLDC GLUCOMTR-MCNC: 183 MG/DL — HIGH (ref 70–99)
GLUCOSE BLDC GLUCOMTR-MCNC: 205 MG/DL — HIGH (ref 70–99)
GLUCOSE BLDC GLUCOMTR-MCNC: 216 MG/DL — HIGH (ref 70–99)

## 2023-02-04 PROCEDURE — 99233 SBSQ HOSP IP/OBS HIGH 50: CPT

## 2023-02-04 RX ADMIN — Medication 975 MILLIGRAM(S): at 22:42

## 2023-02-04 RX ADMIN — CHLORHEXIDINE GLUCONATE 1 APPLICATION(S): 213 SOLUTION TOPICAL at 11:54

## 2023-02-04 RX ADMIN — MIDODRINE HYDROCHLORIDE 10 MILLIGRAM(S): 2.5 TABLET ORAL at 05:34

## 2023-02-04 RX ADMIN — Medication 975 MILLIGRAM(S): at 23:45

## 2023-02-04 RX ADMIN — MIDODRINE HYDROCHLORIDE 10 MILLIGRAM(S): 2.5 TABLET ORAL at 14:07

## 2023-02-04 RX ADMIN — Medication 2: at 07:30

## 2023-02-04 RX ADMIN — AMPICILLIN SODIUM AND SULBACTAM SODIUM 200 GRAM(S): 250; 125 INJECTION, POWDER, FOR SUSPENSION INTRAMUSCULAR; INTRAVENOUS at 22:42

## 2023-02-04 RX ADMIN — DORZOLAMIDE HYDROCHLORIDE TIMOLOL MALEATE 1 DROP(S): 20; 5 SOLUTION/ DROPS OPHTHALMIC at 16:41

## 2023-02-04 RX ADMIN — Medication 975 MILLIGRAM(S): at 06:20

## 2023-02-04 RX ADMIN — Medication 975 MILLIGRAM(S): at 15:02

## 2023-02-04 RX ADMIN — Medication 1: at 16:41

## 2023-02-04 RX ADMIN — DORZOLAMIDE HYDROCHLORIDE TIMOLOL MALEATE 1 DROP(S): 20; 5 SOLUTION/ DROPS OPHTHALMIC at 05:34

## 2023-02-04 RX ADMIN — Medication 1: at 11:57

## 2023-02-04 RX ADMIN — Medication 975 MILLIGRAM(S): at 05:34

## 2023-02-04 RX ADMIN — Medication 975 MILLIGRAM(S): at 14:05

## 2023-02-04 RX ADMIN — Medication 81 MILLIGRAM(S): at 11:55

## 2023-02-04 NOTE — PROGRESS NOTE ADULT - SUBJECTIVE AND OBJECTIVE BOX
Patient is a 47y old  Male who presents with a chief complaint of esrd on hd (04 Feb 2023 16:02)      SUBJECTIVE / OVERNIGHT EVENTS: patient seen and examined by bedside, pt denies headache, dizziness, SOB, CP, Palpitations , N/V/D, abdominal pain      MEDICATIONS  (STANDING):  acetaminophen     Tablet .. 975 milliGRAM(s) Oral every 8 hours  ampicillin/sulbactam  IVPB 3 Gram(s) IV Intermittent every 24 hours  aspirin  chewable 81 milliGRAM(s) Oral daily  chlorhexidine 2% Cloths 1 Application(s) Topical <User Schedule>  dorzolamide 2%/timolol 0.5% Ophthalmic Solution 1 Drop(s) Both EYES two times a day  epoetin deidre-epbx (RETACRIT) Injectable 23735 Unit(s) IV Push <User Schedule>  insulin lispro (ADMELOG) corrective regimen sliding scale   SubCutaneous at bedtime  insulin lispro (ADMELOG) corrective regimen sliding scale   SubCutaneous three times a day before meals  midodrine. 10 milliGRAM(s) Oral three times a day  multivitamin 1 Tablet(s) Oral daily    MEDICATIONS  (PRN):  bisacodyl Suppository 10 milliGRAM(s) Rectal daily PRN If no bowel movement  lactulose Syrup 10 Gram(s) Oral daily PRN Constipation  oxyCODONE    IR 5 milliGRAM(s) Oral every 4 hours PRN Moderate Pain (4 - 6)  oxyCODONE    IR 10 milliGRAM(s) Oral every 4 hours PRN Severe Pain (7 - 10)      Vital Signs Last 24 Hrs  T(C): 36.6 (04 Feb 2023 14:10), Max: 37.1 (03 Feb 2023 21:49)  T(F): 97.8 (04 Feb 2023 14:10), Max: 98.8 (03 Feb 2023 21:49)  HR: 75 (04 Feb 2023 14:10) (63 - 75)  BP: 116/57 (04 Feb 2023 14:10) (94/52 - 150/61)  BP(mean): --  RR: 16 (04 Feb 2023 14:10) (16 - 18)  SpO2: 100% (04 Feb 2023 14:10) (100% - 100%)    Parameters below as of 04 Feb 2023 14:10  Patient On (Oxygen Delivery Method): room air      CAPILLARY BLOOD GLUCOSE      POCT Blood Glucose.: 183 mg/dL (04 Feb 2023 11:56)  POCT Blood Glucose.: 216 mg/dL (04 Feb 2023 07:22)  POCT Blood Glucose.: 187 mg/dL (03 Feb 2023 21:46)  POCT Blood Glucose.: 242 mg/dL (03 Feb 2023 16:42)    I&O's Summary    03 Feb 2023 07:01  -  04 Feb 2023 07:00  --------------------------------------------------------  IN: 500 mL / OUT: 1500 mL / NET: -1000 mL      PHYSICAL EXAM:  CONSTITUTIONAL: NAD, well-developed, well-groomed  RESPIRATORY: Normal respiratory effort; lungs are clear to auscultation bilaterally  CARDIOVASCULAR: Regular rate and rhythm, normal S1 and S2, no murmur/rub/gallop; No lower extremity edema  GASTROINTESTINAL: Nontender to palpation, normoactive bowel sounds, no rebound/guarding; No hepatosplenomegaly  MUSCULOSKELETAL:  no clubbing or cyanosis of digits; s/p B/L BKA , R hand wrapped (s/p amputation of third finger)  SKIN: No rashes; warm, surgical site C/D/I      LABS:                        8.4    6.50  )-----------( 457      ( 03 Feb 2023 11:14 )             27.7     02-03    135  |  94<L>  |  46<H>  ----------------------------<  193<H>  4.8   |  22  |  8.74<H>    Ca    8.3<L>      03 Feb 2023 06:20                RADIOLOGY & ADDITIONAL TESTS:    Imaging Personally Reviewed:    Consultant(s) Notes Reviewed:  ortho , Nephrology     Care Discussed with Consultants/Other Providers:

## 2023-02-04 NOTE — PROGRESS NOTE ADULT - ASSESSMENT
47y y/o Male 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 Repeat I+D on 1/14, 1/16, 1/19, and 1/25.            PT- nonweight bearing right upper extremity          Pain control          DVT prophylaxis- ASA daily/ venodynes   -      Appreciate ID recs          Appreciate ophthalmology recs: outpatient follow-up          OR planning for 2/7    Orthopaedic Surgery  [facility] [place] [id number]  [facility]        [id number]  [facility]  [id number]/ [phone]

## 2023-02-04 NOTE — PROGRESS NOTE ADULT - NUTRITIONAL ASSESSMENT
MEDICATIONS  (STANDING):  acetaminophen     Tablet .. 975 milliGRAM(s) Oral every 8 hours  ampicillin/sulbactam  IVPB 3 Gram(s) IV Intermittent every 24 hours  aspirin  chewable 81 milliGRAM(s) Oral daily  chlorhexidine 2% Cloths 1 Application(s) Topical <User Schedule>  dorzolamide 2%/timolol 0.5% Ophthalmic Solution 1 Drop(s) Both EYES two times a day  epoetin deidre-epbx (RETACRIT) Injectable 48507 Unit(s) IV Push <User Schedule>  insulin lispro (ADMELOG) corrective regimen sliding scale   SubCutaneous at bedtime  insulin lispro (ADMELOG) corrective regimen sliding scale   SubCutaneous three times a day before meals  midodrine. 10 milliGRAM(s) Oral three times a day  multivitamin 1 Tablet(s) Oral daily

## 2023-02-04 NOTE — PROGRESS NOTE ADULT - SUBJECTIVE AND OBJECTIVE BOX
Patient is a 47y Male whom presented to the hospital with esrd on hd     PAST MEDICAL & SURGICAL HISTORY:      MEDICATIONS  (STANDING):  dextrose 5%. 1000 milliLiter(s) (100 mL/Hr) IV Continuous <Continuous>  dextrose 5%. 1000 milliLiter(s) (50 mL/Hr) IV Continuous <Continuous>  dextrose 50% Injectable 25 Gram(s) IV Push once  dextrose 50% Injectable 25 Gram(s) IV Push once  dextrose 50% Injectable 12.5 Gram(s) IV Push once  glucagon  Injectable 1 milliGRAM(s) IntraMuscular once  insulin lispro (ADMELOG) corrective regimen sliding scale   SubCutaneous every 6 hours  pantoprazole    Tablet 40 milliGRAM(s) Oral daily  polyethylene glycol 3350 17 Gram(s) Oral daily  senna 2 Tablet(s) Oral at bedtime      Allergies    No Known Allergies    Intolerances        SOCIAL HISTORY:  Denies ETOh,Smoking,     FAMILY HISTORY:      REVIEW OF SYSTEMS:    CONSTITUTIONAL: No weakness, fevers or chills  NECK: No pain or stiffness  RESPIRATORY: No cough, wheezing, hemoptysis; No shortness of breath  CARDIOVASCULAR: No chest pain or palpitations  GASTROINTESTINAL: No abdominal or epigastric pain. No nausea, vomiting,                                        8.4    6.50  )-----------( 457      ( 03 Feb 2023 11:14 )             27.7       CBC Full  -  ( 03 Feb 2023 11:14 )  WBC Count : 6.50 K/uL  RBC Count : 2.94 M/uL  Hemoglobin : 8.4 g/dL  Hematocrit : 27.7 %  Platelet Count - Automated : 457 K/uL  Mean Cell Volume : 94.2 fL  Mean Cell Hemoglobin : 28.6 pg  Mean Cell Hemoglobin Concentration : 30.3 gm/dL  Auto Neutrophil # : x  Auto Lymphocyte # : x  Auto Monocyte # : x  Auto Eosinophil # : x  Auto Basophil # : x  Auto Neutrophil % : x  Auto Lymphocyte % : x  Auto Monocyte % : x  Auto Eosinophil % : x  Auto Basophil % : x      02-03    135  |  94<L>  |  46<H>  ----------------------------<  193<H>  4.8   |  22  |  8.74<H>    Ca    8.3<L>      03 Feb 2023 06:20        CAPILLARY BLOOD GLUCOSE      POCT Blood Glucose.: 183 mg/dL (04 Feb 2023 11:56)  POCT Blood Glucose.: 216 mg/dL (04 Feb 2023 07:22)  POCT Blood Glucose.: 187 mg/dL (03 Feb 2023 21:46)  POCT Blood Glucose.: 242 mg/dL (03 Feb 2023 16:42)      Vital Signs Last 24 Hrs  T(C): 36.6 (04 Feb 2023 14:10), Max: 37.1 (03 Feb 2023 21:49)  T(F): 97.8 (04 Feb 2023 14:10), Max: 98.8 (03 Feb 2023 21:49)  HR: 75 (04 Feb 2023 14:10) (63 - 75)  BP: 116/57 (04 Feb 2023 14:10) (94/52 - 150/61)  BP(mean): --  RR: 16 (04 Feb 2023 14:10) (16 - 18)  SpO2: 100% (04 Feb 2023 14:10) (100% - 100%)    Parameters below as of 04 Feb 2023 14:10  Patient On (Oxygen Delivery Method): room air                                                          PHYSICAL EXAM:    Constitutional: NAD  HEENT: conjunctive   clear   Neck:  No JVD  Respiratory: CTAB  Cardiovascular: S1 and S2  Gastrointestinal: BS+, soft, NT/ND   right hand dressed. bilateral BKA   right arm AVF nontender

## 2023-02-04 NOTE — PROGRESS NOTE ADULT - PROBLEM SELECTOR PLAN 3
- DbM1k=8.1%. FS well controlled. Continue with ISS for now and continue to monitor FS closely.   - home regimen: Basaglar 5U qhs + Humalog 4U TID qac  - pt on sliding scale only as pt was hypoglycemic on 1/31, consider restarting Lantus at a lower dose   - CC + Renal restricted diet

## 2023-02-04 NOTE — PROGRESS NOTE ADULT - PROBLEM SELECTOR PLAN 1
Pt anticipated to RTOR for repeat I&D and possible wound vac excahnge on 2/7/23  RCRI = 2 (Class III) connoting 10.1% 30-day risk of major adverse cardiac event.   Pt is without acute cardiac condition and tolerated previous I&Ds (1/14 + 1/16 + 1/19 + 1/25, 2/2 ). Patient has no signs or symptoms of ACS/decompensated HF. TTE 1/13 showed EF 62%, nl LV function, mild MS. Medically optimized to proceed to OR.    As workup is unlikely to , pt may RTOR for repeat I&D/VAC exchange  without further cardiac testing.

## 2023-02-04 NOTE — PROGRESS NOTE ADULT - SUBJECTIVE AND OBJECTIVE BOX
ORTHO POC    Patient resting comfortably without complaint.      Vital Signs Last 24 Hrs  T(C): 37.1 (04 Feb 2023 05:30), Max: 37.1 (03 Feb 2023 10:31)  T(F): 98.7 (04 Feb 2023 05:30), Max: 98.8 (03 Feb 2023 10:31)  HR: 72 (04 Feb 2023 05:30) (61 - 74)  BP: 121/63 (04 Feb 2023 05:30) (94/52 - 135/65)  BP(mean): --  RR: 16 (04 Feb 2023 05:30) (16 - 18)  SpO2: 100% (04 Feb 2023 05:30) (97% - 100%)    Parameters below as of 04 Feb 2023 05:30  Patient On (Oxygen Delivery Method): room air        PHYSICAL EXAM:  Resp: Nonlabored  R UE: vac in place with good seal, SILT

## 2023-02-04 NOTE — PROGRESS NOTE ADULT - ASSESSMENT
47M with hx of ESRD on HD, PVD s/p bilateral BKA, T2DM who initially presented to [facility] with right finger swelling and pain, found to have steal syndrome transferred to [facility] for further management. s/p revision of AVF with vascular surgery 1/13, and s/p right 3rd finger amputation and repeated I&D with ortho (1/14 + 1/16 + 1/19 +1/25 and 2/2) s/p SICU stay for hemorrhagic show now back on floors with plan to RTOR 2/7/23 for VAC exchange

## 2023-02-04 NOTE — PROGRESS NOTE ADULT - ASSESSMENT
Patient is a 48 y/o male PMH DM2, Bilateral BKA, ESRD (on HD MWF), last dialyzed yesterday transferred from [facility] emergently for evaluation of evaluation of right finger swelling/pain. Patient reports history of right finger pain after he had a fall one year ago. Patient had a wound on her second/middle finger who he was seeing a hand surgeon for outpatient but unable ultimately to continue seeing due to insurance issues. Patient reports his middle finger developed increased swelling and became black in color about two weeks ago. Denies fevers. Patient transferred to [facility] for further evaluation and emergent OR. Patient received broad spectrum Abx of Zosyn/vanco/clindamycin at [place]. Interpretor phone used for translation with patient. ID# [id number]        f/u  blood and urine cx,serial lactate levels,monitor vitals closley,ivfs hydration,monitor urine output and renal profile,iv abx as per id cons  ampicillin/sulbactam  IVPB 3 Gram(s) IV Intermittent every 24 hours    esrd on hd   Excess fluids and waste products will be removed from your blood; your electrolytes will be balanced; your blood pressure will be controlled.    BP monitoring,continue current antihypertensive meds, low salt diet,followup with PMD in 1-2 weeks      ANEMIA PLAN:  Anemia of chronic disease:  Well controlled by epo  H and H subtherapeutic .  We will continue epo aiming for a HCT of 32-36 %.   We will monitor Iron stores, B12 and RBC folate .  epoetin deidre-epbx (RETACRIT) Injectable 59713 Unit(s) IV Push     Accuchecks monitoring and insulin sliding scale coverage, no concentrated sweets diet, serial labs and f/up with PMD, monitor HB A 1 C every 3-4 mnth  piperacillin/tazobactam IVPB.. 3.375 Gram(s) IV Intermittent every 12 hours

## 2023-02-05 LAB
ANION GAP SERPL CALC-SCNC: 21 MMOL/L — HIGH (ref 7–14)
BUN SERPL-MCNC: 41 MG/DL — HIGH (ref 7–23)
CALCIUM SERPL-MCNC: 8.7 MG/DL — SIGNIFICANT CHANGE UP (ref 8.4–10.5)
CHLORIDE SERPL-SCNC: 95 MMOL/L — LOW (ref 98–107)
CO2 SERPL-SCNC: 20 MMOL/L — LOW (ref 22–31)
CREAT SERPL-MCNC: 9.25 MG/DL — HIGH (ref 0.5–1.3)
EGFR: 6 ML/MIN/1.73M2 — LOW
GLUCOSE BLDC GLUCOMTR-MCNC: 157 MG/DL — HIGH (ref 70–99)
GLUCOSE BLDC GLUCOMTR-MCNC: 167 MG/DL — HIGH (ref 70–99)
GLUCOSE BLDC GLUCOMTR-MCNC: 169 MG/DL — HIGH (ref 70–99)
GLUCOSE BLDC GLUCOMTR-MCNC: 210 MG/DL — HIGH (ref 70–99)
GLUCOSE SERPL-MCNC: 159 MG/DL — HIGH (ref 70–99)
HCT VFR BLD CALC: 24.6 % — LOW (ref 39–50)
HGB BLD-MCNC: 7.5 G/DL — LOW (ref 13–17)
MCHC RBC-ENTMCNC: 28.7 PG — SIGNIFICANT CHANGE UP (ref 27–34)
MCHC RBC-ENTMCNC: 30.5 GM/DL — LOW (ref 32–36)
MCV RBC AUTO: 94.3 FL — SIGNIFICANT CHANGE UP (ref 80–100)
NRBC # BLD: 0 /100 WBCS — SIGNIFICANT CHANGE UP (ref 0–0)
NRBC # FLD: 0 K/UL — SIGNIFICANT CHANGE UP (ref 0–0)
PLATELET # BLD AUTO: 399 K/UL — SIGNIFICANT CHANGE UP (ref 150–400)
POTASSIUM SERPL-MCNC: 4.2 MMOL/L — SIGNIFICANT CHANGE UP (ref 3.5–5.3)
POTASSIUM SERPL-SCNC: 4.2 MMOL/L — SIGNIFICANT CHANGE UP (ref 3.5–5.3)
RBC # BLD: 2.61 M/UL — LOW (ref 4.2–5.8)
RBC # FLD: 15.3 % — HIGH (ref 10.3–14.5)
SODIUM SERPL-SCNC: 136 MMOL/L — SIGNIFICANT CHANGE UP (ref 135–145)
WBC # BLD: 7.39 K/UL — SIGNIFICANT CHANGE UP (ref 3.8–10.5)
WBC # FLD AUTO: 7.39 K/UL — SIGNIFICANT CHANGE UP (ref 3.8–10.5)

## 2023-02-05 PROCEDURE — 99233 SBSQ HOSP IP/OBS HIGH 50: CPT

## 2023-02-05 RX ADMIN — MIDODRINE HYDROCHLORIDE 10 MILLIGRAM(S): 2.5 TABLET ORAL at 22:24

## 2023-02-05 RX ADMIN — Medication 1: at 11:29

## 2023-02-05 RX ADMIN — MIDODRINE HYDROCHLORIDE 10 MILLIGRAM(S): 2.5 TABLET ORAL at 06:44

## 2023-02-05 RX ADMIN — Medication 1 TABLET(S): at 11:29

## 2023-02-05 RX ADMIN — AMPICILLIN SODIUM AND SULBACTAM SODIUM 200 GRAM(S): 250; 125 INJECTION, POWDER, FOR SUSPENSION INTRAMUSCULAR; INTRAVENOUS at 23:28

## 2023-02-05 RX ADMIN — Medication 1: at 07:43

## 2023-02-05 RX ADMIN — CHLORHEXIDINE GLUCONATE 1 APPLICATION(S): 213 SOLUTION TOPICAL at 06:44

## 2023-02-05 RX ADMIN — Medication 975 MILLIGRAM(S): at 06:44

## 2023-02-05 RX ADMIN — Medication 975 MILLIGRAM(S): at 23:18

## 2023-02-05 RX ADMIN — Medication 975 MILLIGRAM(S): at 07:45

## 2023-02-05 RX ADMIN — Medication 81 MILLIGRAM(S): at 11:29

## 2023-02-05 RX ADMIN — Medication 2: at 16:43

## 2023-02-05 RX ADMIN — DORZOLAMIDE HYDROCHLORIDE TIMOLOL MALEATE 1 DROP(S): 20; 5 SOLUTION/ DROPS OPHTHALMIC at 06:43

## 2023-02-05 RX ADMIN — Medication 975 MILLIGRAM(S): at 22:23

## 2023-02-05 RX ADMIN — DORZOLAMIDE HYDROCHLORIDE TIMOLOL MALEATE 1 DROP(S): 20; 5 SOLUTION/ DROPS OPHTHALMIC at 22:23

## 2023-02-05 NOTE — PROGRESS NOTE ADULT - PROBLEM SELECTOR PLAN 2
right 3rd digit gangrene d/t steal syndrome iso of RUE AVF  - management per ortho + vascular surgery   - s/p RUE fistula repair with vascular surgery on 1/13   - s/p right 3rd finger amputation and repeated I&D with ortho (1/14 + 1/16 + 1/19 + 1/25), RTOR next week  - ID recs appreciated: wound cultures grew polymicrobial E coli, Strep, Bacteroides, recommended zosyn through 1/24 -  ortho d/w ID switching to unasyn for continued course given open wounds -- d/w ID  - now on unasyn IV to end after planned closure this week  - plan for return to OR in Thursday 2/2 for further wash-out and possible wound vac  - pain control: ATC Tylenol, Dilaudid 2mg q4h PRN mild pain, 4mg q4h PRN moderate pain, 8mg q4h PRN severe pain, bowel regimen while on opiates  - DVT ppx per primary team right 3rd digit gangrene d/t steal syndrome iso of RUE AVF  - management per ortho + vascular surgery   - s/p RUE fistula repair with vascular surgery on 1/13   - s/p right 3rd finger amputation and repeated I&D with ortho (1/14 + 1/16 + 1/19 + 1/25, 2/2 ), RTOR 2/7   - ID recs appreciated: wound cultures grew polymicrobial E coli, Strep, Bacteroides, recommended zosyn through 1/24 -  ortho d/w ID switching to unasyn for continued course given open wounds -- d/w ID  - now on unasyn IV to end after planned closure this week  - plan for return to OR in Tuesday 2/7 for further wash-out and  wound vac exchange   - pain control: ATC Tylenol, Dilaudid 2mg q4h PRN mild pain, 4mg q4h PRN moderate pain, 8mg q4h PRN severe pain, bowel regimen while on opiates  - DVT ppx per primary team

## 2023-02-05 NOTE — PROGRESS NOTE ADULT - PROBLEM SELECTOR PLAN 3
- BfG5o=9.1%. FS well controlled. Continue with ISS for now and continue to monitor FS closely.   - home regimen: Basaglar 5U qhs + Humalog 4U TID qac  - pt on sliding scale only as pt was hypoglycemic on 1/31, consider restarting Lantus at a lower dose   - CC + Renal restricted diet

## 2023-02-05 NOTE — PROGRESS NOTE ADULT - SUBJECTIVE AND OBJECTIVE BOX
Patient is a 47y old  Male who presents with a chief complaint of esrd on hd (04 Feb 2023 16:02)      SUBJECTIVE / OVERNIGHT EVENTS:    MEDICATIONS  (STANDING):  acetaminophen     Tablet .. 975 milliGRAM(s) Oral every 8 hours  ampicillin/sulbactam  IVPB 3 Gram(s) IV Intermittent every 24 hours  aspirin  chewable 81 milliGRAM(s) Oral daily  chlorhexidine 2% Cloths 1 Application(s) Topical <User Schedule>  dorzolamide 2%/timolol 0.5% Ophthalmic Solution 1 Drop(s) Both EYES two times a day  epoetin deidre-epbx (RETACRIT) Injectable 06411 Unit(s) IV Push <User Schedule>  insulin lispro (ADMELOG) corrective regimen sliding scale   SubCutaneous three times a day before meals  insulin lispro (ADMELOG) corrective regimen sliding scale   SubCutaneous at bedtime  midodrine. 10 milliGRAM(s) Oral three times a day  multivitamin 1 Tablet(s) Oral daily    MEDICATIONS  (PRN):  bisacodyl Suppository 10 milliGRAM(s) Rectal daily PRN If no bowel movement  lactulose Syrup 10 Gram(s) Oral daily PRN Constipation  oxyCODONE    IR 5 milliGRAM(s) Oral every 4 hours PRN Moderate Pain (4 - 6)  oxyCODONE    IR 10 milliGRAM(s) Oral every 4 hours PRN Severe Pain (7 - 10)      Vital Signs Last 24 Hrs  T(C): 36.7 (05 Feb 2023 09:15), Max: 37 (04 Feb 2023 09:46)  T(F): 98 (05 Feb 2023 09:15), Max: 98.6 (04 Feb 2023 09:46)  HR: 62 (05 Feb 2023 09:15) (62 - 75)  BP: 112/41 (05 Feb 2023 09:15) (112/41 - 150/61)  BP(mean): --  RR: 17 (05 Feb 2023 09:15) (16 - 17)  SpO2: 97% (05 Feb 2023 09:15) (97% - 100%)    Parameters below as of 05 Feb 2023 09:15  Patient On (Oxygen Delivery Method): room air      CAPILLARY BLOOD GLUCOSE      POCT Blood Glucose.: 169 mg/dL (05 Feb 2023 07:09)  POCT Blood Glucose.: 205 mg/dL (04 Feb 2023 21:32)  POCT Blood Glucose.: 153 mg/dL (04 Feb 2023 16:29)  POCT Blood Glucose.: 183 mg/dL (04 Feb 2023 11:56)    I&O's Summary      PHYSICAL EXAM:  GENERAL: NAD, well-developed  HEAD:  Atraumatic, Normocephalic  EYES: EOMI, PERRLA, conjunctiva and sclera clear  NECK: Supple, No JVD  CHEST/LUNG: Clear to auscultation bilaterally; No wheeze  HEART: Regular rate and rhythm; No murmurs, rubs, or gallops  ABDOMEN: Soft, Nontender, Nondistended; Bowel sounds present  EXTREMITIES:  2+ Peripheral Pulses, No clubbing, cyanosis, or edema  PSYCH: AAOx3  NEUROLOGY: non-focal  SKIN: No rashes or lesions    LABS:                        7.5    7.39  )-----------( 399      ( 05 Feb 2023 07:17 )             24.6     02-05    136  |  95<L>  |  41<H>  ----------------------------<  159<H>  4.2   |  20<L>  |  9.25<H>    Ca    8.7      05 Feb 2023 07:17                RADIOLOGY & ADDITIONAL TESTS:    Imaging Personally Reviewed:    Consultant(s) Notes Reviewed:      Care Discussed with Consultants/Other Providers:   Patient is a 47y old  Male who presents with a chief complaint of esrd on hd (04 Feb 2023 16:02)      SUBJECTIVE / OVERNIGHT EVENTS: patient seen and examined by bedside, pt with no acute complain, denies headache, dizziness, SOB, CP, Palpitations , N/V/D, abdominal pain      MEDICATIONS  (STANDING):  acetaminophen     Tablet .. 975 milliGRAM(s) Oral every 8 hours  ampicillin/sulbactam  IVPB 3 Gram(s) IV Intermittent every 24 hours  aspirin  chewable 81 milliGRAM(s) Oral daily  chlorhexidine 2% Cloths 1 Application(s) Topical <User Schedule>  dorzolamide 2%/timolol 0.5% Ophthalmic Solution 1 Drop(s) Both EYES two times a day  epoetin deidre-epbx (RETACRIT) Injectable 72164 Unit(s) IV Push <User Schedule>  insulin lispro (ADMELOG) corrective regimen sliding scale   SubCutaneous three times a day before meals  insulin lispro (ADMELOG) corrective regimen sliding scale   SubCutaneous at bedtime  midodrine. 10 milliGRAM(s) Oral three times a day  multivitamin 1 Tablet(s) Oral daily    MEDICATIONS  (PRN):  bisacodyl Suppository 10 milliGRAM(s) Rectal daily PRN If no bowel movement  lactulose Syrup 10 Gram(s) Oral daily PRN Constipation  oxyCODONE    IR 5 milliGRAM(s) Oral every 4 hours PRN Moderate Pain (4 - 6)  oxyCODONE    IR 10 milliGRAM(s) Oral every 4 hours PRN Severe Pain (7 - 10)      Vital Signs Last 24 Hrs  T(C): 36.7 (05 Feb 2023 09:15), Max: 37 (04 Feb 2023 09:46)  T(F): 98 (05 Feb 2023 09:15), Max: 98.6 (04 Feb 2023 09:46)  HR: 62 (05 Feb 2023 09:15) (62 - 75)  BP: 112/41 (05 Feb 2023 09:15) (112/41 - 150/61)  BP(mean): --  RR: 17 (05 Feb 2023 09:15) (16 - 17)  SpO2: 97% (05 Feb 2023 09:15) (97% - 100%)    Parameters below as of 05 Feb 2023 09:15  Patient On (Oxygen Delivery Method): room air      CAPILLARY BLOOD GLUCOSE      POCT Blood Glucose.: 169 mg/dL (05 Feb 2023 07:09)  POCT Blood Glucose.: 205 mg/dL (04 Feb 2023 21:32)  POCT Blood Glucose.: 153 mg/dL (04 Feb 2023 16:29)  POCT Blood Glucose.: 183 mg/dL (04 Feb 2023 11:56)    I&O's Summary      PHYSICAL EXAM:  CONSTITUTIONAL: NAD, well-developed, well-groomed  RESPIRATORY: Normal respiratory effort; lungs are clear to auscultation bilaterally  CARDIOVASCULAR: Regular rate and rhythm, normal S1 and S2, no murmur/rub/gallop; No lower extremity edema  GASTROINTESTINAL: Nontender to palpation, normoactive bowel sounds, no rebound/guarding; No hepatosplenomegaly  MUSCULOSKELETAL:  no clubbing or cyanosis of digits; s/p B/L BKA , R hand wrapped (s/p amputation of third finger)  SKIN: No rashes; warm, surgical site C/D/I      LABS:                        7.5    7.39  )-----------( 399      ( 05 Feb 2023 07:17 )             24.6     02-05    136  |  95<L>  |  41<H>  ----------------------------<  159<H>  4.2   |  20<L>  |  9.25<H>    Ca    8.7      05 Feb 2023 07:17                RADIOLOGY & ADDITIONAL TESTS:    Imaging Personally Reviewed:    Consultant(s) Notes Reviewed:  Ortho, nephro     Care Discussed with Consultants/Other Providers:

## 2023-02-05 NOTE — PROGRESS NOTE ADULT - ASSESSMENT
47y y/o Male 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 Repeat I+D on 1/14, 1/16, 1/19, and 1/25.            PT- nonweight bearing right upper extremity          Pain control          DVT prophylaxis- ASA daily/ venodynes           Appreciate ID recs          Appreciate ophthalmology recs: outpatient follow-up          OR planning for 2/7

## 2023-02-05 NOTE — PROGRESS NOTE ADULT - ASSESSMENT
47M with hx of ESRD on HD, PVD s/p bilateral BKA, T2DM who initially presented to [facility] system with right finger swelling and pain, found to have steal syndrome transferred [facility]. s/p revision of AVF with vascular surgery 1/13, and s/p right 3rd finger amputation and repeated I&D with ortho (1/14 + 1/16 + 1/19 +1/25 and 2/2) s/p SICU stay for hemorrhagic show now back on floors with plan to RTOR 2/7/23 for VAC exchange

## 2023-02-05 NOTE — PROGRESS NOTE ADULT - PROBLEM SELECTOR PLAN 4
Euvolemic on exam  - HD per nephrology   - c/w midodrine and EPO  - monitor BP, electrolytes Euvolemic on exam  - HD per nephrology   - c/w midodrine and EPO  - monitor BP, electrolytes  Anemia 2/2 ESSRD on EPO, Hb 7.5, CTM, transfuse as needed

## 2023-02-05 NOTE — PROGRESS NOTE ADULT - SUBJECTIVE AND OBJECTIVE BOX
Orthopedics      Patient seen and examined at bedside. Feeling well. Pain controlled. No n/v. No acute events overnight.    Vital Signs Last 24 Hrs  T(C): 36.7 (02-05-23 @ 09:15), Max: 36.8 (02-04-23 @ 18:02)  T(F): 98 (02-05-23 @ 09:15), Max: 98.2 (02-04-23 @ 18:02)  HR: 62 (02-05-23 @ 09:15) (62 - 75)  BP: 112/41 (02-05-23 @ 09:15) (112/41 - 138/52)  BP(mean): --  RR: 17 (02-05-23 @ 09:15) (16 - 17)  SpO2: 97% (02-05-23 @ 09:15) (97% - 100%)          Exam:  Gen: NAD, resting comfortably  Resp: Nonlabored  R UE: vac in place with good seal, SILT  Able to spontaneously move fingers

## 2023-02-05 NOTE — PROGRESS NOTE ADULT - ASSESSMENT
Patient is a 48 y/o male PMH DM2, Bilateral BKA, ESRD (on HD MWF), last dialyzed yesterday transferred from [facility] emergently for evaluation of evaluation of right finger swelling/pain. Patient reports history of right finger pain after he had a fall one year ago. Patient had a wound on her second/middle finger who he was seeing a hand surgeon for outpatient but unable ultimately to continue seeing due to insurance issues. Patient reports his middle finger developed increased swelling and became black in color about two weeks ago. Denies fevers. Patient transferred to [facility] for further evaluation and emergent OR. Patient received broad spectrum Abx of Zosyn/vanco/clindamycin at [place]. Interpretor phone used for translation with patient. ID# [id number]        f/u  blood and urine cx,serial lactate levels,monitor vitals closley,ivfs hydration,monitor urine output and renal profile,iv abx as per id cons  ampicillin/sulbactam  IVPB 3 Gram(s) IV Intermittent every 24 hours    esrd on hd mwf   Excess fluids and waste products will be removed from your blood; your electrolytes will be balanced; your blood pressure will be controlled.    BP monitoring,continue current antihypertensive meds, low salt diet,followup with PMD in 1-2 weeks      ANEMIA PLAN:  Anemia of chronic disease:  Well controlled by epo  H and H subtherapeutic .  We will continue epo aiming for a HCT of 32-36 %.   We will monitor Iron stores, B12 and RBC folate .  epoetin deidre-epbx (RETACRIT) Injectable 76390 Unit(s) IV Push     Accuchecks monitoring and insulin sliding scale coverage, no concentrated sweets diet, serial labs and f/up with PMD, monitor HB A 1 C every 3-4 mnth  piperacillin/tazobactam IVPB.. 3.375 Gram(s) IV Intermittent every 12 hours     No

## 2023-02-05 NOTE — PROGRESS NOTE ADULT - NUTRITIONAL ASSESSMENT
MEDICATIONS  (STANDING):  acetaminophen     Tablet .. 975 milliGRAM(s) Oral every 8 hours  ampicillin/sulbactam  IVPB 3 Gram(s) IV Intermittent every 24 hours  aspirin  chewable 81 milliGRAM(s) Oral daily  chlorhexidine 2% Cloths 1 Application(s) Topical <User Schedule>  dorzolamide 2%/timolol 0.5% Ophthalmic Solution 1 Drop(s) Both EYES two times a day  epoetin deidre-epbx (RETACRIT) Injectable 11744 Unit(s) IV Push <User Schedule>  insulin lispro (ADMELOG) corrective regimen sliding scale   SubCutaneous at bedtime  insulin lispro (ADMELOG) corrective regimen sliding scale   SubCutaneous three times a day before meals  midodrine. 10 milliGRAM(s) Oral three times a day  multivitamin 1 Tablet(s) Oral daily

## 2023-02-05 NOTE — PROGRESS NOTE ADULT - SUBJECTIVE AND OBJECTIVE BOX
Patient is a 47y Male whom presented to the hospital with esrd on hd     PAST MEDICAL & SURGICAL HISTORY:      MEDICATIONS  (STANDING):  dextrose 5%. 1000 milliLiter(s) (100 mL/Hr) IV Continuous <Continuous>  dextrose 5%. 1000 milliLiter(s) (50 mL/Hr) IV Continuous <Continuous>  dextrose 50% Injectable 25 Gram(s) IV Push once  dextrose 50% Injectable 25 Gram(s) IV Push once  dextrose 50% Injectable 12.5 Gram(s) IV Push once  glucagon  Injectable 1 milliGRAM(s) IntraMuscular once  insulin lispro (ADMELOG) corrective regimen sliding scale   SubCutaneous every 6 hours  pantoprazole    Tablet 40 milliGRAM(s) Oral daily  polyethylene glycol 3350 17 Gram(s) Oral daily  senna 2 Tablet(s) Oral at bedtime      Allergies    No Known Allergies    Intolerances        SOCIAL HISTORY:  Denies ETOh,Smoking,     FAMILY HISTORY:      REVIEW OF SYSTEMS:    CONSTITUTIONAL: No weakness, fevers or chills  NECK: No pain or stiffness  RESPIRATORY: No cough, wheezing, hemoptysis; No shortness of breath  CARDIOVASCULAR: No chest pain or palpitations  GASTROINTESTINAL: No abdominal or epigastric pain. No nausea, vomiting,                                                                                                        7.5    7.39  )-----------( 399      ( 05 Feb 2023 07:17 )             24.6       CBC Full  -  ( 05 Feb 2023 07:17 )  WBC Count : 7.39 K/uL  RBC Count : 2.61 M/uL  Hemoglobin : 7.5 g/dL  Hematocrit : 24.6 %  Platelet Count - Automated : 399 K/uL  Mean Cell Volume : 94.3 fL  Mean Cell Hemoglobin : 28.7 pg  Mean Cell Hemoglobin Concentration : 30.5 gm/dL  Auto Neutrophil # : x  Auto Lymphocyte # : x  Auto Monocyte # : x  Auto Eosinophil # : x  Auto Basophil # : x  Auto Neutrophil % : x  Auto Lymphocyte % : x  Auto Monocyte % : x  Auto Eosinophil % : x  Auto Basophil % : x      02-05    136  |  95<L>  |  41<H>  ----------------------------<  159<H>  4.2   |  20<L>  |  9.25<H>    Ca    8.7      05 Feb 2023 07:17        CAPILLARY BLOOD GLUCOSE      POCT Blood Glucose.: 167 mg/dL (05 Feb 2023 11:23)  POCT Blood Glucose.: 169 mg/dL (05 Feb 2023 07:09)  POCT Blood Glucose.: 205 mg/dL (04 Feb 2023 21:32)  POCT Blood Glucose.: 153 mg/dL (04 Feb 2023 16:29)      Vital Signs Last 24 Hrs  T(C): 37.1 (05 Feb 2023 13:57), Max: 37.1 (05 Feb 2023 13:57)  T(F): 98.8 (05 Feb 2023 13:57), Max: 98.8 (05 Feb 2023 13:57)  HR: 66 (05 Feb 2023 13:57) (62 - 68)  BP: 126/52 (05 Feb 2023 13:57) (112/41 - 138/52)  BP(mean): --  RR: 16 (05 Feb 2023 13:57) (16 - 17)  SpO2: 95% (05 Feb 2023 13:57) (95% - 100%)    Parameters below as of 05 Feb 2023 13:57  Patient On (Oxygen Delivery Method): room air                PHYSICAL EXAM:    Constitutional: NAD  HEENT: conjunctive   clear   Neck:  No JVD  Respiratory: CTAB  Cardiovascular: S1 and S2  Gastrointestinal: BS+, soft, NT/ND   right hand dressed. bilateral BKA   right arm AVF nontender

## 2023-02-06 LAB
BLD GP AB SCN SERPL QL: NEGATIVE — SIGNIFICANT CHANGE UP
GLUCOSE BLDC GLUCOMTR-MCNC: 132 MG/DL — HIGH (ref 70–99)
GLUCOSE BLDC GLUCOMTR-MCNC: 137 MG/DL — HIGH (ref 70–99)
GLUCOSE BLDC GLUCOMTR-MCNC: 167 MG/DL — HIGH (ref 70–99)
GLUCOSE BLDC GLUCOMTR-MCNC: 248 MG/DL — HIGH (ref 70–99)
HBV SURFACE AG SER-ACNC: SIGNIFICANT CHANGE UP
RH IG SCN BLD-IMP: POSITIVE — SIGNIFICANT CHANGE UP
SARS-COV-2 RNA SPEC QL NAA+PROBE: SIGNIFICANT CHANGE UP

## 2023-02-06 PROCEDURE — 99233 SBSQ HOSP IP/OBS HIGH 50: CPT

## 2023-02-06 RX ORDER — SODIUM CHLORIDE 9 MG/ML
1000 INJECTION, SOLUTION INTRAVENOUS
Refills: 0 | Status: DISCONTINUED | OUTPATIENT
Start: 2023-02-06 | End: 2023-02-14

## 2023-02-06 RX ADMIN — Medication 975 MILLIGRAM(S): at 22:01

## 2023-02-06 RX ADMIN — CHLORHEXIDINE GLUCONATE 1 APPLICATION(S): 213 SOLUTION TOPICAL at 05:51

## 2023-02-06 RX ADMIN — MIDODRINE HYDROCHLORIDE 10 MILLIGRAM(S): 2.5 TABLET ORAL at 22:01

## 2023-02-06 RX ADMIN — Medication 975 MILLIGRAM(S): at 23:12

## 2023-02-06 RX ADMIN — Medication 81 MILLIGRAM(S): at 13:33

## 2023-02-06 RX ADMIN — Medication 975 MILLIGRAM(S): at 13:35

## 2023-02-06 RX ADMIN — Medication 975 MILLIGRAM(S): at 05:50

## 2023-02-06 RX ADMIN — Medication 1: at 17:17

## 2023-02-06 RX ADMIN — ERYTHROPOIETIN 10000 UNIT(S): 10000 INJECTION, SOLUTION INTRAVENOUS; SUBCUTANEOUS at 10:33

## 2023-02-06 RX ADMIN — DORZOLAMIDE HYDROCHLORIDE TIMOLOL MALEATE 1 DROP(S): 20; 5 SOLUTION/ DROPS OPHTHALMIC at 05:50

## 2023-02-06 RX ADMIN — AMPICILLIN SODIUM AND SULBACTAM SODIUM 200 GRAM(S): 250; 125 INJECTION, POWDER, FOR SUSPENSION INTRAMUSCULAR; INTRAVENOUS at 23:12

## 2023-02-06 RX ADMIN — Medication 975 MILLIGRAM(S): at 14:35

## 2023-02-06 RX ADMIN — MIDODRINE HYDROCHLORIDE 10 MILLIGRAM(S): 2.5 TABLET ORAL at 13:34

## 2023-02-06 RX ADMIN — Medication 975 MILLIGRAM(S): at 06:50

## 2023-02-06 RX ADMIN — DORZOLAMIDE HYDROCHLORIDE TIMOLOL MALEATE 1 DROP(S): 20; 5 SOLUTION/ DROPS OPHTHALMIC at 17:17

## 2023-02-06 NOTE — PROGRESS NOTE ADULT - PROBLEM SELECTOR PLAN 3
- HwX5a=3.1%. FS well controlled. Continue with ISS for now and continue to monitor FS closely.   - home regimen: Basaglar 5U qhs + Humalog 4U TID qac  - pt on sliding scale only as pt was hypoglycemic on 1/31, consider restarting Lantus at a lower dose post-op tomorrow -- will monitoring  - CC + Renal restricted diet

## 2023-02-06 NOTE — PROGRESS NOTE ADULT - SUBJECTIVE AND OBJECTIVE BOX
Patient is a 47y Male whom presented to the hospital with esrd on hd     PAST MEDICAL & SURGICAL HISTORY:      MEDICATIONS  (STANDING):  dextrose 5%. 1000 milliLiter(s) (100 mL/Hr) IV Continuous <Continuous>  dextrose 5%. 1000 milliLiter(s) (50 mL/Hr) IV Continuous <Continuous>  dextrose 50% Injectable 25 Gram(s) IV Push once  dextrose 50% Injectable 25 Gram(s) IV Push once  dextrose 50% Injectable 12.5 Gram(s) IV Push once  glucagon  Injectable 1 milliGRAM(s) IntraMuscular once  insulin lispro (ADMELOG) corrective regimen sliding scale   SubCutaneous every 6 hours  pantoprazole    Tablet 40 milliGRAM(s) Oral daily  polyethylene glycol 3350 17 Gram(s) Oral daily  senna 2 Tablet(s) Oral at bedtime      Allergies    No Known Allergies    Intolerances        SOCIAL HISTORY:  Denies ETOh,Smoking,     FAMILY HISTORY:      REVIEW OF SYSTEMS:    CONSTITUTIONAL: No weakness, fevers or chills  NECK: No pain or stiffness  RESPIRATORY: No cough, wheezing, hemoptysis; No shortness of breath  CARDIOVASCULAR: No chest pain or palpitations  GASTROINTESTINAL: No abdominal or epigastric pain. No nausea, vomiting,                                                              7.5    7.39  )-----------( 399      ( 05 Feb 2023 07:17 )             24.6       CBC Full  -  ( 05 Feb 2023 07:17 )  WBC Count : 7.39 K/uL  RBC Count : 2.61 M/uL  Hemoglobin : 7.5 g/dL  Hematocrit : 24.6 %  Platelet Count - Automated : 399 K/uL  Mean Cell Volume : 94.3 fL  Mean Cell Hemoglobin : 28.7 pg  Mean Cell Hemoglobin Concentration : 30.5 gm/dL  Auto Neutrophil # : x  Auto Lymphocyte # : x  Auto Monocyte # : x  Auto Eosinophil # : x  Auto Basophil # : x  Auto Neutrophil % : x  Auto Lymphocyte % : x  Auto Monocyte % : x  Auto Eosinophil % : x  Auto Basophil % : x      02-05    136  |  95<L>  |  41<H>  ----------------------------<  159<H>  4.2   |  20<L>  |  9.25<H>    Ca    8.7      05 Feb 2023 07:17        CAPILLARY BLOOD GLUCOSE      POCT Blood Glucose.: 132 mg/dL (06 Feb 2023 11:33)  POCT Blood Glucose.: 137 mg/dL (06 Feb 2023 07:30)  POCT Blood Glucose.: 157 mg/dL (05 Feb 2023 21:47)  POCT Blood Glucose.: 210 mg/dL (05 Feb 2023 16:40)      Vital Signs Last 24 Hrs  T(C): 36.8 (06 Feb 2023 09:55), Max: 37.1 (05 Feb 2023 13:57)  T(F): 98.3 (06 Feb 2023 09:55), Max: 98.8 (05 Feb 2023 13:57)  HR: 61 (06 Feb 2023 09:55) (61 - 72)  BP: 133/66 (06 Feb 2023 09:55) (91/37 - 136/55)  BP(mean): --  RR: 18 (06 Feb 2023 09:55) (14 - 18)  SpO2: 100% (06 Feb 2023 09:55) (95% - 100%)    Parameters below as of 06 Feb 2023 09:55  Patient On (Oxygen Delivery Method): room air                  PHYSICAL EXAM:    Constitutional: NAD  HEENT: conjunctive   clear   Neck:  No JVD  Respiratory: CTAB  Cardiovascular: S1 and S2  Gastrointestinal: BS+, soft, NT/ND   right hand dressed. bilateral BKA   right arm AVF nontender

## 2023-02-06 NOTE — PROGRESS NOTE ADULT - SUBJECTIVE AND OBJECTIVE BOX
Orthopedics      Patient seen and examined at bedside. Feeling well. Pain controlled. No n/v. No acute events overnight.    Vital Signs Last 24 Hrs  T(C): 36.6 (06 Feb 2023 01:53), Max: 37.1 (05 Feb 2023 13:57)  T(F): 97.8 (06 Feb 2023 01:53), Max: 98.8 (05 Feb 2023 13:57)  HR: 65 (06 Feb 2023 01:53) (62 - 72)  BP: 136/55 (06 Feb 2023 01:53) (91/37 - 136/55)  BP(mean): --  RR: 16 (06 Feb 2023 01:53) (14 - 17)  SpO2: 100% (06 Feb 2023 01:53) (95% - 100%)    Parameters below as of 06 Feb 2023 01:53  Patient On (Oxygen Delivery Method): room air          Exam:  Gen: NAD, resting comfortably  Resp: Nonlabored  R UE: vac in place with good seal, SILT  Able to spontaneously move fingers

## 2023-02-06 NOTE — PROGRESS NOTE ADULT - ASSESSMENT
Patient is a 46 y/o male PMH DM2, Bilateral BKA, ESRD (on HD MWF), last dialyzed yesterday transferred from [facility] emergently for evaluation of evaluation of right finger swelling/pain. Patient reports history of right finger pain after he had a fall one year ago. Patient had a wound on her second/middle finger who he was seeing a hand surgeon for outpatient but unable ultimately to continue seeing due to insurance issues. Patient reports his middle finger developed increased swelling and became black in color about two weeks ago. Denies fevers. Patient transferred to [facility] for further evaluation and emergent OR. Patient received broad spectrum Abx of Zosyn/vanco/clindamycin at [place]. Interpretor phone used for translation with patient. ID# [id number]        f/u  blood and urine cx,serial lactate levels,monitor vitals closley,ivfs hydration,monitor urine output and renal profile,iv abx as per id cons  ampicillin/sulbactam  IVPB 3 Gram(s) IV Intermittent every 24 hours    esrd on hd mwf   Excess fluids and waste products will be removed from your blood; your electrolytes will be balanced; your blood pressure will be controlled.    BP monitoring,continue current antihypertensive meds, low salt diet,followup with PMD in 1-2 weeks      ANEMIA PLAN:  Anemia of chronic disease:  Well controlled by epo  H and H subtherapeutic .  We will continue epo aiming for a HCT of 32-36 %.   We will monitor Iron stores, B12 and RBC folate .  epoetin deidre-epbx (RETACRIT) Injectable 72043 Unit(s) IV Push     Accuchecks monitoring and insulin sliding scale coverage, no concentrated sweets diet, serial labs and f/up with PMD, monitor HB A 1 C every 3-4 mnth  piperacillin/tazobactam IVPB.. 3.375 Gram(s) IV Intermittent every 12 hours

## 2023-02-06 NOTE — PROGRESS NOTE ADULT - SUBJECTIVE AND OBJECTIVE BOX
Surgery Progress Note     Subjective/24hour Events:   Patient seen and examined.   No acute events overnight.   Pain controlled.   Dialysis working well through R arm AVF.   Wound vac in place.     Vital Signs:  Vital Signs Last 24 Hrs  T(C): 36.8 (06 Feb 2023 13:29), Max: 36.8 (06 Feb 2023 09:20)  T(F): 98.2 (06 Feb 2023 13:29), Max: 98.3 (06 Feb 2023 09:20)  HR: 78 (06 Feb 2023 13:29) (61 - 78)  BP: 115/40 (06 Feb 2023 13:29) (91/37 - 136/55)  BP(mean): --  RR: 18 (06 Feb 2023 13:00) (14 - 18)  SpO2: 100% (06 Feb 2023 13:29) (100% - 100%)    Parameters below as of 06 Feb 2023 13:29  Patient On (Oxygen Delivery Method): room air        CAPILLARY BLOOD GLUCOSE      POCT Blood Glucose.: 132 mg/dL (06 Feb 2023 11:33)  POCT Blood Glucose.: 137 mg/dL (06 Feb 2023 07:30)  POCT Blood Glucose.: 157 mg/dL (05 Feb 2023 21:47)  POCT Blood Glucose.: 210 mg/dL (05 Feb 2023 16:40)      I&O's Detail    05 Feb 2023 07:01  -  06 Feb 2023 07:00  --------------------------------------------------------  IN:  Total IN: 0 mL    OUT:    VAC (Vacuum Assisted Closure) System (mL): 190 mL  Total OUT: 190 mL    Total NET: -190 mL      06 Feb 2023 07:01  -  06 Feb 2023 15:47  --------------------------------------------------------  IN:    Other (mL): 600 mL    PRBCs (Packed Red Blood Cells): 300 mL  Total IN: 900 mL    OUT:    Other (mL): 1540 mL  Total OUT: 1540 mL    Total NET: -640 mL          MEDICATIONS  (STANDING):  acetaminophen     Tablet .. 975 milliGRAM(s) Oral every 8 hours  ampicillin/sulbactam  IVPB 3 Gram(s) IV Intermittent every 24 hours  aspirin  chewable 81 milliGRAM(s) Oral daily  chlorhexidine 2% Cloths 1 Application(s) Topical <User Schedule>  dorzolamide 2%/timolol 0.5% Ophthalmic Solution 1 Drop(s) Both EYES two times a day  epoetin deidre-epbx (RETACRIT) Injectable 25868 Unit(s) IV Push <User Schedule>  insulin lispro (ADMELOG) corrective regimen sliding scale   SubCutaneous three times a day before meals  insulin lispro (ADMELOG) corrective regimen sliding scale   SubCutaneous at bedtime  lactated ringers. 1000 milliLiter(s) (75 mL/Hr) IV Continuous <Continuous>  midodrine. 10 milliGRAM(s) Oral three times a day  multivitamin 1 Tablet(s) Oral daily    MEDICATIONS  (PRN):  bisacodyl Suppository 10 milliGRAM(s) Rectal daily PRN If no bowel movement  lactulose Syrup 10 Gram(s) Oral daily PRN Constipation  oxyCODONE    IR 5 milliGRAM(s) Oral every 4 hours PRN Moderate Pain (4 - 6)  oxyCODONE    IR 10 milliGRAM(s) Oral every 4 hours PRN Severe Pain (7 - 10)      Physical Exam:  Gen: NAD.  Lungs: Non labored breathing.   Ab: Soft, nontender, nondistended. R groin incision clean, dry, and intact with staples.   Ext: RUE wound vac in place. R AVF site well healed with nylons.     Labs:    02-05    136  |  95<L>  |  41<H>  ----------------------------<  159<H>  4.2   |  20<L>  |  9.25<H>    Ca    8.7      05 Feb 2023 07:17                              7.5    7.39  )-----------( 399      ( 05 Feb 2023 07:17 )             24.6

## 2023-02-06 NOTE — PROGRESS NOTE ADULT - PROBLEM SELECTOR PLAN 1
Pt anticipated to RTOR for repeat I&D and possible wound vac excahnge on 2/7/23  RCRI = 2 (Class III) connoting 10.1% 30-day risk of major adverse cardiac event.   Pt is without acute cardiac condition and tolerated previous I&Ds (1/14 + 1/16 + 1/19 + 1/25 + 2/2 ). Patient has no signs or symptoms of ACS/decompensated HF. TTE 1/13 showed EF 62%, nl LV function, mild MS. Medically optimized to proceed to OR.    As workup is unlikely to , pt may RTOR for repeat I&D/VAC exchange  without further cardiac testing.

## 2023-02-06 NOTE — PROGRESS NOTE ADULT - SUBJECTIVE AND OBJECTIVE BOX
CHIEF COMPLAINT: f/u steal syndrome    SUBJECTIVE / OVERNIGHT EVENTS: Patient seen and examined. No acute events overnight. Pain well controlled and patient without any complaints.    MEDICATIONS  (STANDING):  acetaminophen     Tablet .. 975 milliGRAM(s) Oral every 8 hours  ampicillin/sulbactam  IVPB 3 Gram(s) IV Intermittent every 24 hours  aspirin  chewable 81 milliGRAM(s) Oral daily  chlorhexidine 2% Cloths 1 Application(s) Topical <User Schedule>  dorzolamide 2%/timolol 0.5% Ophthalmic Solution 1 Drop(s) Both EYES two times a day  epoetin deidre-epbx (RETACRIT) Injectable 69826 Unit(s) IV Push <User Schedule>  insulin lispro (ADMELOG) corrective regimen sliding scale   SubCutaneous three times a day before meals  insulin lispro (ADMELOG) corrective regimen sliding scale   SubCutaneous at bedtime  lactated ringers. 1000 milliLiter(s) (75 mL/Hr) IV Continuous <Continuous>  midodrine. 10 milliGRAM(s) Oral three times a day  multivitamin 1 Tablet(s) Oral daily    MEDICATIONS  (PRN):  bisacodyl Suppository 10 milliGRAM(s) Rectal daily PRN If no bowel movement  lactulose Syrup 10 Gram(s) Oral daily PRN Constipation  oxyCODONE    IR 5 milliGRAM(s) Oral every 4 hours PRN Moderate Pain (4 - 6)  oxyCODONE    IR 10 milliGRAM(s) Oral every 4 hours PRN Severe Pain (7 - 10)    VITALS:  T(F): 98.3 (02-06-23 @ 09:55), Max: 98.8 (02-05-23 @ 13:57)  HR: 61 (02-06-23 @ 09:55) (61 - 72)  BP: 133/66 (02-06-23 @ 09:55) (91/37 - 136/55)  RR: 18 (02-06-23 @ 09:55) (14 - 18)  SpO2: 100% (02-06-23 @ 09:55)    PHYSICAL EXAM:  CONSTITUTIONAL: NAD, well-developed, well-groomed  RESPIRATORY: Normal respiratory effort; lungs are clear to auscultation bilaterally  CARDIOVASCULAR: Regular rate and rhythm, normal S1 and S2, no murmur/rub/gallop; No lower extremity edema  GASTROINTESTINAL: Nontender to palpation, normoactive bowel sounds, no rebound/guarding; No hepatosplenomegaly  MUSCULOSKELETAL:  no clubbing or cyanosis of digits; s/p B/L BKA , R hand wrapped (s/p amputation of third finger)  SKIN: No rashes; warm, surgical site C/D/I    LABS:              7.5                  136  | 20   | 41           7.39  >-----------< 399     ------------------------< 159                   24.6                 4.2  | 95   | 9.25                                         Ca 8.7   Mg x     Ph x        CAPILLARY BLOOD GLUCOSE  POCT Blood Glucose.: 132 mg/dL (06 Feb 2023 11:33)  POCT Blood Glucose.: 137 mg/dL (06 Feb 2023 07:30)  POCT Blood Glucose.: 157 mg/dL (05 Feb 2023 21:47)  POCT Blood Glucose.: 210 mg/dL (05 Feb 2023 16:40)    [ ] Consultant(s) Notes Reviewed:  [x] Care Discussed with Consultants/Other Providers: Orthopedic PA - discussed pre-op eval

## 2023-02-06 NOTE — PROGRESS NOTE ADULT - ASSESSMENT
47M with hx of ESRD on HD, PVD s/p bilateral BKA, T2DM who initially presented to [facility] with right finger swelling and pain, found to have steal syndrome transferred to [facility] for further management. s/p revision of AVF with vascular surgery 1/13, and s/p right 3rd finger amputation and repeated I&D with ortho (1/14 + 1/16 + 1/19 +1/25 and 2/2) s/p SICU stay for hemorrhagic show now back on floors with plan to RTOR 2/7/23 for VAC exchange.

## 2023-02-06 NOTE — PROGRESS NOTE ADULT - ASSESSMENT
47y y/o Male s/p 1/13 brachiobasilic AVF revision with R GSV harvest.     - Groin and arm sites well healed, clean, dry, and intact.   - Staples and nylons removed.   - Excellent care per Orthopedic Surgery appreciated.   - Please do not hesitate to contact for any additional questions or concerns.     C Team Vascular Surgery Pager #[phone]

## 2023-02-06 NOTE — PROGRESS NOTE ADULT - NUTRITIONAL ASSESSMENT
MEDICATIONS  (STANDING):  acetaminophen     Tablet .. 975 milliGRAM(s) Oral every 8 hours  ampicillin/sulbactam  IVPB 3 Gram(s) IV Intermittent every 24 hours  aspirin  chewable 81 milliGRAM(s) Oral daily  chlorhexidine 2% Cloths 1 Application(s) Topical <User Schedule>  dorzolamide 2%/timolol 0.5% Ophthalmic Solution 1 Drop(s) Both EYES two times a day  epoetin deidre-epbx (RETACRIT) Injectable 57532 Unit(s) IV Push <User Schedule>  insulin lispro (ADMELOG) corrective regimen sliding scale   SubCutaneous at bedtime  insulin lispro (ADMELOG) corrective regimen sliding scale   SubCutaneous three times a day before meals  midodrine. 10 milliGRAM(s) Oral three times a day  multivitamin 1 Tablet(s) Oral daily

## 2023-02-06 NOTE — PROGRESS NOTE ADULT - PROBLEM SELECTOR PLAN 2
right 3rd digit gangrene d/t steal syndrome iso of RUE AVF  - management per ortho + vascular surgery   - s/p RUE fistula repair with vascular surgery on 1/13   - s/p right 3rd finger amputation and repeated I&D with ortho (1/14 + 1/16 + 1/19 + 1/25, 2/2 ), RTOR 2/7   - ID recs appreciated: wound cultures grew polymicrobial E coli, Strep, Bacteroides, recommended zosyn through 1/24 -  ortho d/w ID switching to unasyn for continued course given open wounds -- d/w ID  - now on unasyn IV to end after planned closure this week  - plan for return to OR in Tuesday 2/7 for further wash-out and  wound vac exchange   - pain control: ATC Tylenol, Dilaudid 2mg q4h PRN mild pain, 4mg q4h PRN moderate pain, 8mg q4h PRN severe pain, bowel regimen while on opiates  - DVT ppx per primary team

## 2023-02-06 NOTE — PROGRESS NOTE ADULT - PROBLEM SELECTOR PLAN 4
Euvolemic on exam  - HD per nephrology   - c/w midodrine and EPO  - monitor BP, electrolytes  Anemia 2/2 ESSRD on EPO, Hb 7.5, CTM, transfuse as needed

## 2023-02-07 ENCOUNTER — APPOINTMENT (OUTPATIENT)
Dept: ORTHOPEDIC SURGERY | Facility: HOSPITAL | Age: 48
End: 2023-02-07

## 2023-02-07 DIAGNOSIS — I95.9 HYPOTENSION, UNSPECIFIED: ICD-10-CM

## 2023-02-07 LAB
ANION GAP SERPL CALC-SCNC: 19 MMOL/L — HIGH (ref 7–14)
APTT BLD: 29.7 SEC — SIGNIFICANT CHANGE UP (ref 27–36.3)
BLD GP AB SCN SERPL QL: NEGATIVE — SIGNIFICANT CHANGE UP
BUN SERPL-MCNC: 34 MG/DL — HIGH (ref 7–23)
CALCIUM SERPL-MCNC: 9 MG/DL — SIGNIFICANT CHANGE UP (ref 8.4–10.5)
CHLORIDE SERPL-SCNC: 98 MMOL/L — SIGNIFICANT CHANGE UP (ref 98–107)
CO2 SERPL-SCNC: 23 MMOL/L — SIGNIFICANT CHANGE UP (ref 22–31)
CREAT SERPL-MCNC: 7.26 MG/DL — HIGH (ref 0.5–1.3)
EGFR: 9 ML/MIN/1.73M2 — LOW
GLUCOSE BLDC GLUCOMTR-MCNC: 125 MG/DL — HIGH (ref 70–99)
GLUCOSE BLDC GLUCOMTR-MCNC: 133 MG/DL — HIGH (ref 70–99)
GLUCOSE BLDC GLUCOMTR-MCNC: 140 MG/DL — HIGH (ref 70–99)
GLUCOSE BLDC GLUCOMTR-MCNC: 162 MG/DL — HIGH (ref 70–99)
GLUCOSE BLDC GLUCOMTR-MCNC: 165 MG/DL — HIGH (ref 70–99)
GLUCOSE BLDC GLUCOMTR-MCNC: 171 MG/DL — HIGH (ref 70–99)
GLUCOSE SERPL-MCNC: 162 MG/DL — HIGH (ref 70–99)
HCT VFR BLD CALC: 31.2 % — LOW (ref 39–50)
HGB BLD-MCNC: 9.7 G/DL — LOW (ref 13–17)
INR BLD: 1.1 RATIO — SIGNIFICANT CHANGE UP (ref 0.88–1.16)
MAGNESIUM SERPL-MCNC: 2.3 MG/DL — SIGNIFICANT CHANGE UP (ref 1.6–2.6)
MCHC RBC-ENTMCNC: 27.8 PG — SIGNIFICANT CHANGE UP (ref 27–34)
MCHC RBC-ENTMCNC: 31.1 GM/DL — LOW (ref 32–36)
MCV RBC AUTO: 89.4 FL — SIGNIFICANT CHANGE UP (ref 80–100)
NRBC # BLD: 0 /100 WBCS — SIGNIFICANT CHANGE UP (ref 0–0)
NRBC # FLD: 0 K/UL — SIGNIFICANT CHANGE UP (ref 0–0)
PHOSPHATE SERPL-MCNC: 6.3 MG/DL — HIGH (ref 2.5–4.5)
PLATELET # BLD AUTO: 405 K/UL — HIGH (ref 150–400)
POTASSIUM SERPL-MCNC: 4 MMOL/L — SIGNIFICANT CHANGE UP (ref 3.5–5.3)
POTASSIUM SERPL-SCNC: 4 MMOL/L — SIGNIFICANT CHANGE UP (ref 3.5–5.3)
PROTHROM AB SERPL-ACNC: 12.8 SEC — SIGNIFICANT CHANGE UP (ref 10.5–13.4)
RBC # BLD: 3.49 M/UL — LOW (ref 4.2–5.8)
RBC # FLD: 18.3 % — HIGH (ref 10.3–14.5)
RH IG SCN BLD-IMP: POSITIVE — SIGNIFICANT CHANGE UP
SODIUM SERPL-SCNC: 140 MMOL/L — SIGNIFICANT CHANGE UP (ref 135–145)
WBC # BLD: 9.68 K/UL — SIGNIFICANT CHANGE UP (ref 3.8–10.5)
WBC # FLD AUTO: 9.68 K/UL — SIGNIFICANT CHANGE UP (ref 3.8–10.5)

## 2023-02-07 PROCEDURE — 99233 SBSQ HOSP IP/OBS HIGH 50: CPT

## 2023-02-07 PROCEDURE — 15275 SKIN SUB GRAFT FACE/NK/HF/G: CPT | Mod: 58,RT

## 2023-02-07 PROCEDURE — 15004 WOUND PREP F/N/HF/G: CPT | Mod: 58,RT

## 2023-02-07 PROCEDURE — 15002 WOUND PREP TRK/ARM/LEG: CPT | Mod: 58,RT

## 2023-02-07 PROCEDURE — 15271 SKIN SUB GRAFT TRNK/ARM/LEG: CPT | Mod: 58,RT

## 2023-02-07 DEVICE — PARTICLE POWDER MATRISTEM MICROMATRIX 1000MG: Type: IMPLANTABLE DEVICE | Status: FUNCTIONAL

## 2023-02-07 RX ORDER — HEPARIN SODIUM 5000 [USP'U]/ML
5000 INJECTION INTRAVENOUS; SUBCUTANEOUS EVERY 8 HOURS
Refills: 0 | Status: DISCONTINUED | OUTPATIENT
Start: 2023-02-07 | End: 2023-02-15

## 2023-02-07 RX ORDER — INSULIN LISPRO 100/ML
VIAL (ML) SUBCUTANEOUS EVERY 6 HOURS
Refills: 0 | Status: DISCONTINUED | OUTPATIENT
Start: 2023-02-07 | End: 2023-02-07

## 2023-02-07 RX ORDER — INSULIN LISPRO 100/ML
VIAL (ML) SUBCUTANEOUS
Refills: 0 | Status: DISCONTINUED | OUTPATIENT
Start: 2023-02-07 | End: 2023-02-24

## 2023-02-07 RX ORDER — INSULIN LISPRO 100/ML
VIAL (ML) SUBCUTANEOUS AT BEDTIME
Refills: 0 | Status: DISCONTINUED | OUTPATIENT
Start: 2023-02-07 | End: 2023-02-24

## 2023-02-07 RX ADMIN — MIDODRINE HYDROCHLORIDE 10 MILLIGRAM(S): 2.5 TABLET ORAL at 06:20

## 2023-02-07 RX ADMIN — HEPARIN SODIUM 5000 UNIT(S): 5000 INJECTION INTRAVENOUS; SUBCUTANEOUS at 23:14

## 2023-02-07 RX ADMIN — Medication 975 MILLIGRAM(S): at 13:44

## 2023-02-07 RX ADMIN — Medication 975 MILLIGRAM(S): at 23:12

## 2023-02-07 RX ADMIN — MIDODRINE HYDROCHLORIDE 10 MILLIGRAM(S): 2.5 TABLET ORAL at 13:46

## 2023-02-07 RX ADMIN — Medication 975 MILLIGRAM(S): at 06:20

## 2023-02-07 RX ADMIN — DORZOLAMIDE HYDROCHLORIDE TIMOLOL MALEATE 1 DROP(S): 20; 5 SOLUTION/ DROPS OPHTHALMIC at 06:20

## 2023-02-07 RX ADMIN — SODIUM CHLORIDE 75 MILLILITER(S): 9 INJECTION, SOLUTION INTRAVENOUS at 00:53

## 2023-02-07 RX ADMIN — DORZOLAMIDE HYDROCHLORIDE TIMOLOL MALEATE 1 DROP(S): 20; 5 SOLUTION/ DROPS OPHTHALMIC at 23:12

## 2023-02-07 RX ADMIN — Medication 975 MILLIGRAM(S): at 07:29

## 2023-02-07 RX ADMIN — AMPICILLIN SODIUM AND SULBACTAM SODIUM 200 GRAM(S): 250; 125 INJECTION, POWDER, FOR SUSPENSION INTRAMUSCULAR; INTRAVENOUS at 23:11

## 2023-02-07 RX ADMIN — Medication 975 MILLIGRAM(S): at 23:57

## 2023-02-07 RX ADMIN — Medication 1 TABLET(S): at 13:44

## 2023-02-07 RX ADMIN — CHLORHEXIDINE GLUCONATE 1 APPLICATION(S): 213 SOLUTION TOPICAL at 06:20

## 2023-02-07 RX ADMIN — Medication 1: at 11:58

## 2023-02-07 NOTE — PROGRESS NOTE ADULT - SUBJECTIVE AND OBJECTIVE BOX
CHIEF COMPLAINT: f/u steal syndrome    SUBJECTIVE / OVERNIGHT EVENTS: Patient seen and examined. Overnight, patient had episode of hypotension which improved after patient received IVF. Pain well controlled and patient without any complaints.    MEDICATIONS  (STANDING):  acetaminophen     Tablet .. 975 milliGRAM(s) Oral every 8 hours  ampicillin/sulbactam  IVPB 3 Gram(s) IV Intermittent every 24 hours  aspirin  chewable 81 milliGRAM(s) Oral daily  chlorhexidine 2% Cloths 1 Application(s) Topical <User Schedule>  dorzolamide 2%/timolol 0.5% Ophthalmic Solution 1 Drop(s) Both EYES two times a day  epoetin deidre-epbx (RETACRIT) Injectable 47910 Unit(s) IV Push <User Schedule>  insulin lispro (ADMELOG) corrective regimen sliding scale   SubCutaneous every 6 hours  lactated ringers. 1000 milliLiter(s) (75 mL/Hr) IV Continuous <Continuous>  midodrine. 10 milliGRAM(s) Oral three times a day  multivitamin 1 Tablet(s) Oral daily    MEDICATIONS  (PRN):  bisacodyl Suppository 10 milliGRAM(s) Rectal daily PRN If no bowel movement  lactulose Syrup 10 Gram(s) Oral daily PRN Constipation  oxyCODONE    IR 5 milliGRAM(s) Oral every 4 hours PRN Moderate Pain (4 - 6)  oxyCODONE    IR 10 milliGRAM(s) Oral every 4 hours PRN Severe Pain (7 - 10)      VITALS:  T(F): 97.8 (02-07-23 @ 09:52), Max: 98.9 (02-07-23 @ 06:31)  HR: 67 (02-07-23 @ 09:52) (67 - 85)  BP: 127/52 (02-07-23 @ 09:52) (98/52 - 143/53)  RR: 18 (02-07-23 @ 09:52) (16 - 18)  SpO2: 100% (02-07-23 @ 09:52)    PHYSICAL EXAM:  CONSTITUTIONAL: NAD, well-developed, well-groomed  RESPIRATORY: Normal respiratory effort; lungs are clear to auscultation bilaterally  CARDIOVASCULAR: Regular rate and rhythm, normal S1 and S2, no murmur/rub/gallop; No lower extremity edema  GASTROINTESTINAL: Nontender to palpation, normoactive bowel sounds, no rebound/guarding; No hepatosplenomegaly  MUSCULOSKELETAL:  no clubbing or cyanosis of digits; s/p B/L BKA , R hand wrapped (s/p amputation of third finger)  SKIN: No rashes; warm, surgical site C/D/I    LABS:              9.7                  140  | 23   | 34           9.68  >-----------< 405     ------------------------< 162                   31.2                 4.0  | 98   | 7.26                                         Ca 9.0   Mg 2.30  Ph 6.3      INR: 1.10 ratio;    PT: 12.8 sec;    PTT: 29.7 sec    CAPILLARY BLOOD GLUCOSE  POCT Blood Glucose.: 133 mg/dL (07 Feb 2023 06:13)  POCT Blood Glucose.: 165 mg/dL (07 Feb 2023 00:47)  POCT Blood Glucose.: 248 mg/dL (06 Feb 2023 21:37)  POCT Blood Glucose.: 167 mg/dL (06 Feb 2023 16:54)  POCT Blood Glucose.: 132 mg/dL (06 Feb 2023 11:33)    [ ] Consultant(s) Notes Reviewed:  [x] Care Discussed with Consultants/Other Providers: Orthopedic PA - discussed plan for OR today

## 2023-02-07 NOTE — PROGRESS NOTE ADULT - PROBLEM SELECTOR PLAN 1
-Pt anticipated to RTOR for repeat I&D and possible wound vac exchange today (2/7/23)  -RCRI = 2 (Class III) connoting 10.1% 30-day risk of major adverse cardiac event.   -Pt is without acute cardiac condition and tolerated previous I&Ds (1/14 + 1/16 + 1/19 + 1/25 + 2/2 ). Patient has no signs or symptoms of ACS/decompensated HF. TTE 1/13 showed EF 62%, nl LV function, mild MS. Medically optimized to proceed to OR.    -As workup is unlikely to , pt may RTOR for repeat I&D/VAC exchange  without further cardiac testing.

## 2023-02-07 NOTE — PROGRESS NOTE ADULT - SUBJECTIVE AND OBJECTIVE BOX
Patient is a 47y Male whom presented to the hospital with esrd on hd     PAST MEDICAL & SURGICAL HISTORY:      MEDICATIONS  (STANDING):  dextrose 5%. 1000 milliLiter(s) (100 mL/Hr) IV Continuous <Continuous>  dextrose 5%. 1000 milliLiter(s) (50 mL/Hr) IV Continuous <Continuous>  dextrose 50% Injectable 25 Gram(s) IV Push once  dextrose 50% Injectable 25 Gram(s) IV Push once  dextrose 50% Injectable 12.5 Gram(s) IV Push once  glucagon  Injectable 1 milliGRAM(s) IntraMuscular once  insulin lispro (ADMELOG) corrective regimen sliding scale   SubCutaneous every 6 hours  pantoprazole    Tablet 40 milliGRAM(s) Oral daily  polyethylene glycol 3350 17 Gram(s) Oral daily  senna 2 Tablet(s) Oral at bedtime      Allergies    No Known Allergies    Intolerances        SOCIAL HISTORY:  Denies ETOh,Smoking,     FAMILY HISTORY:      REVIEW OF SYSTEMS:    CONSTITUTIONAL: No weakness, fevers or chills  NECK: No pain or stiffness  RESPIRATORY: No cough, wheezing, hemoptysis; No shortness of breath  CARDIOVASCULAR: No chest pain or palpitations  GASTROINTESTINAL: No abdominal or epigastric pain. No nausea, vomiting,                                                                                    9.7    9.68  )-----------( 405      ( 07 Feb 2023 04:00 )             31.2       CBC Full  -  ( 07 Feb 2023 04:00 )  WBC Count : 9.68 K/uL  RBC Count : 3.49 M/uL  Hemoglobin : 9.7 g/dL  Hematocrit : 31.2 %  Platelet Count - Automated : 405 K/uL  Mean Cell Volume : 89.4 fL  Mean Cell Hemoglobin : 27.8 pg  Mean Cell Hemoglobin Concentration : 31.1 gm/dL  Auto Neutrophil # : x  Auto Lymphocyte # : x  Auto Monocyte # : x  Auto Eosinophil # : x  Auto Basophil # : x  Auto Neutrophil % : x  Auto Lymphocyte % : x  Auto Monocyte % : x  Auto Eosinophil % : x  Auto Basophil % : x      02-07    140  |  98  |  34<H>  ----------------------------<  162<H>  4.0   |  23  |  7.26<H>    Ca    9.0      07 Feb 2023 04:00  Phos  6.3     02-07  Mg     2.30     02-07        CAPILLARY BLOOD GLUCOSE      POCT Blood Glucose.: 125 mg/dL (07 Feb 2023 19:42)  POCT Blood Glucose.: 140 mg/dL (07 Feb 2023 13:53)  POCT Blood Glucose.: 171 mg/dL (07 Feb 2023 11:40)  POCT Blood Glucose.: 133 mg/dL (07 Feb 2023 06:13)  POCT Blood Glucose.: 165 mg/dL (07 Feb 2023 00:47)  POCT Blood Glucose.: 248 mg/dL (06 Feb 2023 21:37)      Vital Signs Last 24 Hrs  T(C): 36.6 (07 Feb 2023 13:41), Max: 37.2 (07 Feb 2023 06:31)  T(F): 97.9 (07 Feb 2023 13:41), Max: 98.9 (07 Feb 2023 06:31)  HR: 61 (07 Feb 2023 13:41) (61 - 85)  BP: 120/50 (07 Feb 2023 13:41) (98/52 - 143/53)  BP(mean): --  RR: 18 (07 Feb 2023 13:41) (16 - 18)  SpO2: 100% (07 Feb 2023 13:41) (100% - 100%)    Parameters below as of 07 Feb 2023 09:52  Patient On (Oxygen Delivery Method): room air            PT/INR - ( 07 Feb 2023 04:00 )   PT: 12.8 sec;   INR: 1.10 ratio         PTT - ( 07 Feb 2023 04:00 )  PTT:29.7 sec        PHYSICAL EXAM:    Constitutional: NAD  HEENT: conjunctive   clear   Neck:  No JVD  Respiratory: CTAB  Cardiovascular: S1 and S2  Gastrointestinal: BS+, soft, NT/ND   right hand dressed. bilateral BKA   right arm AVF nontender

## 2023-02-07 NOTE — BRIEF OPERATIVE NOTE - NSICDXBRIEFPOSTOP_GEN_ALL_CORE_FT
POST-OP DIAGNOSIS:  Necrotic ulceration of fingers 10-Sudeep-2023 19:52:32  Owen Espinosa  

## 2023-02-07 NOTE — PROGRESS NOTE ADULT - SUBJECTIVE AND OBJECTIVE BOX
Orthopedics      Patient seen and examined at bedside. Feeling well. Pain controlled. No n/v. No acute events overnight.    Vital Signs Last 24 Hrs  T(C): 36.9 (02-07-23 @ 02:20), Max: 36.9 (02-06-23 @ 21:41)  T(F): 98.4 (02-07-23 @ 02:20), Max: 98.4 (02-06-23 @ 21:41)  HR: 71 (02-07-23 @ 02:20) (61 - 85)  BP: 143/53 (02-07-23 @ 02:20) (98/52 - 143/53)  BP(mean): --  RR: 16 (02-07-23 @ 02:20) (16 - 18)  SpO2: 100% (02-07-23 @ 02:20) (100% - 100%)      PT/INR - ( 07 Feb 2023 04:00 )   PT: 12.8 sec;   INR: 1.10 ratio         PTT - ( 07 Feb 2023 04:00 )  PTT:29.7 sec    Exam:  Gen: NAD, resting comfortably  Resp: Nonlabored  R UE: vac in place with good seal, SILT  Able to spontaneously move fingers

## 2023-02-07 NOTE — PROGRESS NOTE ADULT - PROBLEM SELECTOR PLAN 4
- UzA5t=5.1%. FS well controlled. Continue with ISS for now and continue to monitor FS closely.   - home regimen: Basaglar 5U qhs + Humalog 4U TID qac  - pt on sliding scale only as pt was hypoglycemic on 1/31, consider restarting Lantus at a lower dose post-op tomorrow -- will monitoring  - CC + Renal restricted diet

## 2023-02-07 NOTE — PROGRESS NOTE ADULT - ASSESSMENT
47M with hx of ESRD on HD, PVD s/p bilateral BKA, T2DM who initially presented to [facility] with right finger swelling and pain, found to have steal syndrome transferred to [facility] for further management. s/p revision of AVF with vascular surgery 1/13, and s/p right 3rd finger amputation and repeated I&D with ortho (1/14 + 1/16 + 1/19 +1/25 and 2/2) s/p SICU stay for hemorrhagic show now back on floors with plan to RTOR today for VAC exchange.

## 2023-02-07 NOTE — PROGRESS NOTE ADULT - ASSESSMENT
47y y/o Male 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 Repeat I+D on 1/14, 1/16, 1/19, and 1/25.            NPO for OR          PT- nonweight bearing right upper extremity          Pain control          DVT prophylaxis- ASA daily/ venodynes           Appreciate ID recs          Appreciate ophthalmology recs: outpatient follow-up          OR today 2/7

## 2023-02-07 NOTE — PROGRESS NOTE ADULT - NUTRITIONAL ASSESSMENT
MEDICATIONS  (STANDING):  acetaminophen     Tablet .. 975 milliGRAM(s) Oral every 8 hours  ampicillin/sulbactam  IVPB 3 Gram(s) IV Intermittent every 24 hours  aspirin  chewable 81 milliGRAM(s) Oral daily  chlorhexidine 2% Cloths 1 Application(s) Topical <User Schedule>  dorzolamide 2%/timolol 0.5% Ophthalmic Solution 1 Drop(s) Both EYES two times a day  epoetin deidre-epbx (RETACRIT) Injectable 78046 Unit(s) IV Push <User Schedule>  insulin lispro (ADMELOG) corrective regimen sliding scale   SubCutaneous at bedtime  insulin lispro (ADMELOG) corrective regimen sliding scale   SubCutaneous three times a day before meals  midodrine. 10 milliGRAM(s) Oral three times a day  multivitamin 1 Tablet(s) Oral daily no chest pain, no cough, and no shortness of breath.

## 2023-02-07 NOTE — PROGRESS NOTE ADULT - ASSESSMENT
Patient is a 46 y/o male PMH DM2, Bilateral BKA, ESRD (on HD MWF), last dialyzed yesterday transferred from [facility] emergently for evaluation of evaluation of right finger swelling/pain. Patient reports history of right finger pain after he had a fall one year ago. Patient had a wound on her second/middle finger who he was seeing a hand surgeon for outpatient but unable ultimately to continue seeing due to insurance issues. Patient reports his middle finger developed increased swelling and became black in color about two weeks ago. Denies fevers. Patient transferred to [facility] for further evaluation and emergent OR. Patient received broad spectrum Abx of Zosyn/vanco/clindamycin at [place]. Interpretor phone used for translation with patient. ID# [id number]        f/u  blood and urine cx,serial lactate levels,monitor vitals closley,ivfs hydration,monitor urine output and renal profile,iv abx as per id cons  ampicillin/sulbactam  IVPB 3 Gram(s) IV Intermittent every 24 hours    esrd on hd mwf   Excess fluids and waste products will be removed from your blood; your electrolytes will be balanced; your blood pressure will be controlled.    BP monitoring,continue current antihypertensive meds, low salt diet,followup with PMD in 1-2 weeks      ANEMIA PLAN:  Anemia of chronic disease:  Well controlled by epo  H and H subtherapeutic .  We will continue epo aiming for a HCT of 32-36 %.   We will monitor Iron stores, B12 and RBC folate .  epoetin deidre-epbx (RETACRIT) Injectable 36089 Unit(s) IV Push     Accuchecks monitoring and insulin sliding scale coverage, no concentrated sweets diet, serial labs and f/up with PMD, monitor HB A 1 C every 3-4 mnth  piperacillin/tazobactam IVPB.. 3.375 Gram(s) IV Intermittent every 12 hours

## 2023-02-07 NOTE — PROGRESS NOTE ADULT - PROBLEM SELECTOR PLAN 5
Euvolemic on exam  - HD per nephrology   - c/w midodrine and EPO  - monitor BP, electrolytes  Anemia 2/2 ESRD on EPO, Hb 7.5, CTM, transfuse as needed

## 2023-02-07 NOTE — BRIEF OPERATIVE NOTE - NSICDXBRIEFPREOP_GEN_ALL_CORE_FT
PRE-OP DIAGNOSIS:  Necrotic ulceration of fingers 10-Sudeep-2023 19:52:23  Owen Espinosa  

## 2023-02-08 LAB
ANION GAP SERPL CALC-SCNC: 24 MMOL/L — HIGH (ref 7–14)
BUN SERPL-MCNC: 43 MG/DL — HIGH (ref 7–23)
CALCIUM SERPL-MCNC: 8.9 MG/DL — SIGNIFICANT CHANGE UP (ref 8.4–10.5)
CHLORIDE SERPL-SCNC: 94 MMOL/L — LOW (ref 98–107)
CO2 SERPL-SCNC: 17 MMOL/L — LOW (ref 22–31)
CREAT SERPL-MCNC: 9.05 MG/DL — HIGH (ref 0.5–1.3)
CULTURE RESULTS: SIGNIFICANT CHANGE UP
CULTURE RESULTS: SIGNIFICANT CHANGE UP
EGFR: 7 ML/MIN/1.73M2 — LOW
GLUCOSE BLDC GLUCOMTR-MCNC: 166 MG/DL — HIGH (ref 70–99)
GLUCOSE BLDC GLUCOMTR-MCNC: 231 MG/DL — HIGH (ref 70–99)
GLUCOSE BLDC GLUCOMTR-MCNC: 249 MG/DL — HIGH (ref 70–99)
GLUCOSE BLDC GLUCOMTR-MCNC: 256 MG/DL — HIGH (ref 70–99)
GLUCOSE BLDC GLUCOMTR-MCNC: 312 MG/DL — HIGH (ref 70–99)
GLUCOSE BLDC GLUCOMTR-MCNC: 326 MG/DL — HIGH (ref 70–99)
GLUCOSE SERPL-MCNC: 295 MG/DL — HIGH (ref 70–99)
HCT VFR BLD CALC: 32.3 % — LOW (ref 39–50)
HGB BLD-MCNC: 10 G/DL — LOW (ref 13–17)
MCHC RBC-ENTMCNC: 27.6 PG — SIGNIFICANT CHANGE UP (ref 27–34)
MCHC RBC-ENTMCNC: 31 GM/DL — LOW (ref 32–36)
MCV RBC AUTO: 89.2 FL — SIGNIFICANT CHANGE UP (ref 80–100)
NRBC # BLD: 0 /100 WBCS — SIGNIFICANT CHANGE UP (ref 0–0)
NRBC # FLD: 0 K/UL — SIGNIFICANT CHANGE UP (ref 0–0)
PLATELET # BLD AUTO: 416 K/UL — HIGH (ref 150–400)
POTASSIUM SERPL-MCNC: 4.9 MMOL/L — SIGNIFICANT CHANGE UP (ref 3.5–5.3)
POTASSIUM SERPL-SCNC: 4.9 MMOL/L — SIGNIFICANT CHANGE UP (ref 3.5–5.3)
RBC # BLD: 3.62 M/UL — LOW (ref 4.2–5.8)
RBC # FLD: 16.8 % — HIGH (ref 10.3–14.5)
SODIUM SERPL-SCNC: 135 MMOL/L — SIGNIFICANT CHANGE UP (ref 135–145)
SPECIMEN SOURCE: SIGNIFICANT CHANGE UP
SPECIMEN SOURCE: SIGNIFICANT CHANGE UP
WBC # BLD: 7.81 K/UL — SIGNIFICANT CHANGE UP (ref 3.8–10.5)
WBC # FLD AUTO: 7.81 K/UL — SIGNIFICANT CHANGE UP (ref 3.8–10.5)

## 2023-02-08 PROCEDURE — 99233 SBSQ HOSP IP/OBS HIGH 50: CPT

## 2023-02-08 RX ORDER — SODIUM CHLORIDE 9 MG/ML
1000 INJECTION, SOLUTION INTRAVENOUS
Refills: 0 | Status: DISCONTINUED | OUTPATIENT
Start: 2023-02-08 | End: 2023-02-24

## 2023-02-08 RX ORDER — GLUCAGON INJECTION, SOLUTION 0.5 MG/.1ML
1 INJECTION, SOLUTION SUBCUTANEOUS ONCE
Refills: 0 | Status: DISCONTINUED | OUTPATIENT
Start: 2023-02-08 | End: 2023-02-24

## 2023-02-08 RX ORDER — DEXTROSE 50 % IN WATER 50 %
15 SYRINGE (ML) INTRAVENOUS ONCE
Refills: 0 | Status: DISCONTINUED | OUTPATIENT
Start: 2023-02-08 | End: 2023-02-24

## 2023-02-08 RX ORDER — INSULIN GLARGINE 100 [IU]/ML
5 INJECTION, SOLUTION SUBCUTANEOUS AT BEDTIME
Refills: 0 | Status: DISCONTINUED | OUTPATIENT
Start: 2023-02-08 | End: 2023-02-15

## 2023-02-08 RX ORDER — DEXTROSE 50 % IN WATER 50 %
25 SYRINGE (ML) INTRAVENOUS ONCE
Refills: 0 | Status: DISCONTINUED | OUTPATIENT
Start: 2023-02-08 | End: 2023-02-24

## 2023-02-08 RX ORDER — DEXTROSE 50 % IN WATER 50 %
12.5 SYRINGE (ML) INTRAVENOUS ONCE
Refills: 0 | Status: DISCONTINUED | OUTPATIENT
Start: 2023-02-08 | End: 2023-02-24

## 2023-02-08 RX ADMIN — Medication 975 MILLIGRAM(S): at 07:05

## 2023-02-08 RX ADMIN — Medication 2: at 11:34

## 2023-02-08 RX ADMIN — Medication 1: at 21:14

## 2023-02-08 RX ADMIN — Medication 4: at 16:37

## 2023-02-08 RX ADMIN — Medication 975 MILLIGRAM(S): at 13:29

## 2023-02-08 RX ADMIN — HEPARIN SODIUM 5000 UNIT(S): 5000 INJECTION INTRAVENOUS; SUBCUTANEOUS at 06:25

## 2023-02-08 RX ADMIN — HEPARIN SODIUM 5000 UNIT(S): 5000 INJECTION INTRAVENOUS; SUBCUTANEOUS at 13:30

## 2023-02-08 RX ADMIN — Medication 975 MILLIGRAM(S): at 14:29

## 2023-02-08 RX ADMIN — DORZOLAMIDE HYDROCHLORIDE TIMOLOL MALEATE 1 DROP(S): 20; 5 SOLUTION/ DROPS OPHTHALMIC at 19:33

## 2023-02-08 RX ADMIN — Medication 975 MILLIGRAM(S): at 06:23

## 2023-02-08 RX ADMIN — Medication 81 MILLIGRAM(S): at 11:33

## 2023-02-08 RX ADMIN — DORZOLAMIDE HYDROCHLORIDE TIMOLOL MALEATE 1 DROP(S): 20; 5 SOLUTION/ DROPS OPHTHALMIC at 06:24

## 2023-02-08 RX ADMIN — ERYTHROPOIETIN 10000 UNIT(S): 10000 INJECTION, SOLUTION INTRAVENOUS; SUBCUTANEOUS at 23:31

## 2023-02-08 RX ADMIN — CHLORHEXIDINE GLUCONATE 1 APPLICATION(S): 213 SOLUTION TOPICAL at 11:37

## 2023-02-08 RX ADMIN — Medication 2: at 07:33

## 2023-02-08 NOTE — PROGRESS NOTE ADULT - PROBLEM SELECTOR PLAN 3
right 3rd digit gangrene d/t steal syndrome iso of RUE AVF  - management per ortho + vascular surgery   - s/p RUE fistula repair with vascular surgery on 1/13   - s/p right 3rd finger amputation and repeated I&D with ortho (1/14 + 1/16 + 1/19 + 1/25, 2/2 and  2/7 )  - ID recs appreciated: wound cultures grew polymicrobial E coli, Strep, Bacteroides, recommended zosyn through 1/24 -  ortho d/w ID switching to unasyn for continued course given open wounds -- d/w ID  - now on unasyn IV to end after planned closure this week  - S/P return to OR in Tuesday 2/7 for further wash-out and  wound vac exchange   - pain control: ATC Tylenol, Dilaudid 2mg q4h PRN mild pain, 4mg q4h PRN moderate pain, 8mg q4h PRN severe pain, bowel regimen while on opiates  - DVT ppx per primary team

## 2023-02-08 NOTE — PROGRESS NOTE ADULT - SUBJECTIVE AND OBJECTIVE BOX
Patient is a 47y old  Male who presents with a chief complaint of esrd on hd (07 Feb 2023 20:24)      SUBJECTIVE / OVERNIGHT EVENTS: patient seen and examined by bedside , pt denies headache, dizziness, SOB, CP, Palpitations , N/V/D, abdominal pain  no acute events over night      MEDICATIONS  (STANDING):  acetaminophen     Tablet .. 975 milliGRAM(s) Oral every 8 hours  ampicillin/sulbactam  IVPB 3 Gram(s) IV Intermittent every 24 hours  aspirin  chewable 81 milliGRAM(s) Oral daily  chlorhexidine 2% Cloths 1 Application(s) Topical <User Schedule>  dorzolamide 2%/timolol 0.5% Ophthalmic Solution 1 Drop(s) Both EYES two times a day  epoetin deidre-epbx (RETACRIT) Injectable 77992 Unit(s) IV Push <User Schedule>  heparin   Injectable 5000 Unit(s) SubCutaneous every 8 hours  insulin lispro (ADMELOG) corrective regimen sliding scale   SubCutaneous three times a day before meals  insulin lispro (ADMELOG) corrective regimen sliding scale   SubCutaneous at bedtime  lactated ringers. 1000 milliLiter(s) (75 mL/Hr) IV Continuous <Continuous>  midodrine. 10 milliGRAM(s) Oral three times a day  multivitamin 1 Tablet(s) Oral daily    MEDICATIONS  (PRN):  bisacodyl Suppository 10 milliGRAM(s) Rectal daily PRN If no bowel movement  lactulose Syrup 10 Gram(s) Oral daily PRN Constipation  oxyCODONE    IR 5 milliGRAM(s) Oral every 4 hours PRN Moderate Pain (4 - 6)  oxyCODONE    IR 10 milliGRAM(s) Oral every 4 hours PRN Severe Pain (7 - 10)      Vital Signs Last 24 Hrs  T(C): 36.6 (08 Feb 2023 13:30), Max: 37 (08 Feb 2023 09:00)  T(F): 97.8 (08 Feb 2023 13:30), Max: 98.6 (08 Feb 2023 09:00)  HR: 72 (08 Feb 2023 13:30) (71 - 83)  BP: 142/68 (08 Feb 2023 13:30) (124/70 - 166/57)  BP(mean): 78 (07 Feb 2023 20:30) (78 - 85)  RR: 17 (08 Feb 2023 13:30) (12 - 19)  SpO2: 100% (08 Feb 2023 13:30) (99% - 100%)    Parameters below as of 08 Feb 2023 13:30  Patient On (Oxygen Delivery Method): room air      CAPILLARY BLOOD GLUCOSE      POCT Blood Glucose.: 312 mg/dL (08 Feb 2023 16:19)  POCT Blood Glucose.: 326 mg/dL (08 Feb 2023 16:17)  POCT Blood Glucose.: 231 mg/dL (08 Feb 2023 11:20)  POCT Blood Glucose.: 249 mg/dL (08 Feb 2023 07:26)  POCT Blood Glucose.: 162 mg/dL (07 Feb 2023 21:31)  POCT Blood Glucose.: 125 mg/dL (07 Feb 2023 19:42)    I&O's Summary    07 Feb 2023 07:01  -  08 Feb 2023 07:00  --------------------------------------------------------  IN: 0 mL / OUT: 0 mL / NET: 0 mL    PHYSICAL EXAM:  CONSTITUTIONAL: NAD, well-developed, well-groomed  RESPIRATORY: Normal respiratory effort; lungs are clear to auscultation bilaterally  CARDIOVASCULAR: Regular rate and rhythm, normal S1 and S2, no murmur/rub/gallop; No lower extremity edema  GASTROINTESTINAL: Nontender to palpation, normoactive bowel sounds, no rebound/guarding; No hepatosplenomegaly  MUSCULOSKELETAL:  no clubbing or cyanosis of digits; s/p B/L BKA , R hand wrapped (s/p amputation of third finger)  SKIN: No rashes; warm      LABS:                        10.0   7.81  )-----------( 416      ( 08 Feb 2023 05:58 )             32.3     02-08    135  |  94<L>  |  43<H>  ----------------------------<  295<H>  4.9   |  17<L>  |  9.05<H>    Ca    8.9      08 Feb 2023 05:58  Phos  6.3     02-07  Mg     2.30     02-07      PT/INR - ( 07 Feb 2023 04:00 )   PT: 12.8 sec;   INR: 1.10 ratio         PTT - ( 07 Feb 2023 04:00 )  PTT:29.7 sec          RADIOLOGY & ADDITIONAL TESTS:    Imaging Personally Reviewed:    Consultant(s) Notes Reviewed:      Care Discussed with Consultants/Other Providers:

## 2023-02-08 NOTE — PROGRESS NOTE ADULT - ASSESSMENT
47y y/o Male 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 Repeat I+D on 1/14, 1/16, 1/19, 1/25, 2/7.                   PT- nonweight bearing right upper extremity          Pain control          DVT prophylaxis- ASA daily/ venodynes           Appreciate ID recs          Appreciate ophthalmology recs: outpatient follow-up          Dispo: TBD    Orthopaedic Surgery  [facility] [id number]  [facility]        [id number]  [facility]  [id number]/ [phone]

## 2023-02-08 NOTE — PROGRESS NOTE ADULT - PROBLEM SELECTOR PLAN 4
- JhS4v=0.1%. FS well controlled. Continue with ISS for now and continue to monitor FS closely.   - home regimen: Basaglar 5U qhs + Humalog 4U TID qac  - pt on sliding scale only as pt was hypoglycemic on 1/31, consider restarting Lantus at a lower dose as FSBS elevated   - CC + Renal restricted diet

## 2023-02-08 NOTE — PROGRESS NOTE ADULT - PROBLEM SELECTOR PLAN 5
Euvolemic on exam  - HD per nephrology   - c/w midodrine and EPO  - monitor BP, electrolytes  Anemia 2/2 ESRD on EPO, Hb 10, s/p 1unit on 2/6    CTM, transfuse as needed

## 2023-02-08 NOTE — PROGRESS NOTE ADULT - SUBJECTIVE AND OBJECTIVE BOX
Orthopedics      Patient seen and examined at bedside. Feeling well. Pain controlled.     No acute events overnight or in the immediate post-op period    Vital Signs Last 24 Hrs  T(C): 36.7 (08 Feb 2023 01:36), Max: 37.2 (07 Feb 2023 06:31)  T(F): 98.1 (08 Feb 2023 01:36), Max: 98.9 (07 Feb 2023 06:31)  HR: 72 (08 Feb 2023 01:36) (61 - 75)  BP: 147/53 (08 Feb 2023 01:36) (120/50 - 164/59)  BP(mean): 78 (07 Feb 2023 20:30) (78 - 85)  RR: 17 (08 Feb 2023 01:36) (12 - 19)  SpO2: 100% (08 Feb 2023 01:36) (99% - 100%)    Parameters below as of 08 Feb 2023 01:36  Patient On (Oxygen Delivery Method): room air          Exam:  Gen: NAD, resting comfortably  Resp: Nonlabored  R UE: vac in place with good seal, SILT  Able to spontaneously move fingers

## 2023-02-08 NOTE — CHART NOTE - NSCHARTNOTEFT_GEN_A_CORE
Reason for Follow-Up Assessment: Follow-Up    47y y/o Male 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 Repeat I+D on 1/14, 1/16, 1/19, 1/25, 2/7.    Patient noted with fair to good PO intake.  Elevated serum Phos / POCT Glucose per recent labs.  No GI distress reported i.e. nausea, vomiting, diarrhea. No chewing or swallowing difficulties reported.     Source: [ X ] Patient [ ] Family [ ] RN [X] Chart      Diet, Consistent Carbohydrate Renal w/Evening Snack (02-07-23 @ 19:29)    GI: WDL. Last BM noted on [2/7]    PO intake:  [ ] Poor < 50%  [X] Fair 50-75% [X] Good  % [ ] Inconsistent PO intake    Enteral /Parenteral Nutrition:       [ X ] n/a    Anthropometrics: Height (cm): 170.2 (02-02), 170.2 (01-09)  Weight - 2/6 -  61.3kg      2/4 - 67.1kg      2/3 - 64.4kg      2/1 - 61kg      1/30 - 63.5kg      1/26 - 73.5kg      1/25 - 70.4kg      1/24 - 69.7kg       Adm/dosing 65.8kg      Adjusted IBW for BKAs 60.7kg       Expect weight fluctuations with HD tx due to fluid shifts.              Edema: Rt Hand 1+ edema noted per flowsheets    Pressure Injuries: No pressure ulcers/DTI noted in flowsheets.     _______________ Pertinent Medications_______________  MEDICATIONS  (STANDING):  acetaminophen     Tablet .. 975 milliGRAM(s) Oral every 8 hours  ampicillin/sulbactam  IVPB 3 Gram(s) IV Intermittent every 24 hours  aspirin  chewable 81 milliGRAM(s) Oral daily  chlorhexidine 2% Cloths 1 Application(s) Topical <User Schedule>  dorzolamide 2%/timolol 0.5% Ophthalmic Solution 1 Drop(s) Both EYES two times a day  epoetin deidre-epbx (RETACRIT) Injectable 11571 Unit(s) IV Push <User Schedule>  heparin   Injectable 5000 Unit(s) SubCutaneous every 8 hours  insulin lispro (ADMELOG) corrective regimen sliding scale   SubCutaneous three times a day before meals  insulin lispro (ADMELOG) corrective regimen sliding scale   SubCutaneous at bedtime  lactated ringers. 1000 milliLiter(s) (75 mL/Hr) IV Continuous <Continuous>  midodrine. 10 milliGRAM(s) Oral three times a day  multivitamin 1 Tablet(s) Oral daily    __________________ Pertinent Labs__________________   02-08 Na135 mmol/L Glu 295 mg/dL<H> K+ 4.9 mmol/L Cr  9.05 mg/dL<H> BUN 43 mg/dL<H> 02-07 Phos 6.3 mg/dL<H>    POCT Blood Glucose.: 231 mg/dL (02-08-23 @ 11:20)  POCT Blood Glucose.: 249 mg/dL (02-08-23 @ 07:26)  POCT Blood Glucose.: 162 mg/dL (02-07-23 @ 21:31)  POCT Blood Glucose.: 125 mg/dL (02-07-23 @ 19:42)  POCT Blood Glucose.: 140 mg/dL (02-07-23 @ 13:53)  POCT Blood Glucose.: 171 mg/dL (02-07-23 @ 11:40)  POCT Blood Glucose.: 133 mg/dL (02-07-23 @ 06:13)  POCT Blood Glucose.: 165 mg/dL (02-07-23 @ 00:47)  POCT Blood Glucose.: 248 mg/dL (02-06-23 @ 21:37)  POCT Blood Glucose.: 167 mg/dL (02-06-23 @ 16:54)  POCT Blood Glucose.: 132 mg/dL (02-06-23 @ 11:33)  POCT Blood Glucose.: 137 mg/dL (02-06-23 @ 07:30)  POCT Blood Glucose.: 157 mg/dL (02-05-23 @ 21:47)  POCT Blood Glucose.: 210 mg/dL (02-05-23 @ 16:40)      Estimated Needs:   [X] no change since previous assessment  [ ] recalculated:     Previous Nutrition Diagnosis: Altered Nutrition Related Labs    Nutrition Diagnosis is [ ] ongoing  [ ] resolved [ ] not applicable     Monitoring and Evaluation:     [ x ] Tolerance to diet prescription / adequacy of meal intake  [ x ] Weight trends   [ x ] Pertinent labs    Recommendations:  1) Diet / Supplement Changes: Continue diet as ordered; Suggest ProSource No Carb 1 packet daily for added protein = 60kcal, 15gm protein  2) Obtain and record current weights to best monitor for acute changes in nutritional status.  3) Please consistently document % meal intake in nursing flowsheets.    Rubi Spence MS, RDN, CDN  Pager [phone]  Also available on MS Teams Reason for Follow-Up Assessment: Follow-Up    47y y/o Male 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 Repeat I+D on 1/14, 1/16, 1/19, 1/25, 2/7.    Patient noted with fair to good PO intake.  Elevated serum Phos / POCT Glucose per recent labs.  No GI distress reported i.e. nausea, vomiting, diarrhea. No chewing or swallowing difficulties reported. Spoke with patient, stated he wants extra protein at breakfast and now amenable to receiving Nepro. Communicated same to Ortho team. Reviewed renal diet with patient.    Source: [ X ] Patient [ ] Family [ ] RN [X] Chart      Diet, Consistent Carbohydrate Renal w/Evening Snack (02-07-23 @ 19:29)    GI: WDL. Last BM noted on [2/7]    PO intake:  [ ] Poor < 50%  [X] Fair 50-75% [X] Good  % [ ] Inconsistent PO intake    Enteral /Parenteral Nutrition:       [ X ] n/a    Anthropometrics: Height (cm): 170.2 (02-02), 170.2 (01-09)  Weight - 2/6 -  61.3kg      2/4 - 67.1kg      2/3 - 64.4kg      2/1 - 61kg      1/30 - 63.5kg      1/26 - 73.5kg      1/25 - 70.4kg      1/24 - 69.7kg       Adm/dosing 65.8kg      Adjusted IBW for BKAs 60.7kg       Expect weight fluctuations with HD tx due to fluid shifts.              Edema: Rt Hand 1+ edema noted per flowsheets    Pressure Injuries: No pressure ulcers/DTI noted in flowsheets.     _______________ Pertinent Medications_______________  MEDICATIONS  (STANDING):  acetaminophen     Tablet .. 975 milliGRAM(s) Oral every 8 hours  ampicillin/sulbactam  IVPB 3 Gram(s) IV Intermittent every 24 hours  aspirin  chewable 81 milliGRAM(s) Oral daily  chlorhexidine 2% Cloths 1 Application(s) Topical <User Schedule>  dorzolamide 2%/timolol 0.5% Ophthalmic Solution 1 Drop(s) Both EYES two times a day  epoetin deidre-epbx (RETACRIT) Injectable 95706 Unit(s) IV Push <User Schedule>  heparin   Injectable 5000 Unit(s) SubCutaneous every 8 hours  insulin lispro (ADMELOG) corrective regimen sliding scale   SubCutaneous three times a day before meals  insulin lispro (ADMELOG) corrective regimen sliding scale   SubCutaneous at bedtime  lactated ringers. 1000 milliLiter(s) (75 mL/Hr) IV Continuous <Continuous>  midodrine. 10 milliGRAM(s) Oral three times a day  multivitamin 1 Tablet(s) Oral daily    __________________ Pertinent Labs__________________   02-08 Na135 mmol/L Glu 295 mg/dL<H> K+ 4.9 mmol/L Cr  9.05 mg/dL<H> BUN 43 mg/dL<H> 02-07 Phos 6.3 mg/dL<H>    POCT Blood Glucose.: 231 mg/dL (02-08-23 @ 11:20)  POCT Blood Glucose.: 249 mg/dL (02-08-23 @ 07:26)  POCT Blood Glucose.: 162 mg/dL (02-07-23 @ 21:31)  POCT Blood Glucose.: 125 mg/dL (02-07-23 @ 19:42)  POCT Blood Glucose.: 140 mg/dL (02-07-23 @ 13:53)  POCT Blood Glucose.: 171 mg/dL (02-07-23 @ 11:40)  POCT Blood Glucose.: 133 mg/dL (02-07-23 @ 06:13)  POCT Blood Glucose.: 165 mg/dL (02-07-23 @ 00:47)  POCT Blood Glucose.: 248 mg/dL (02-06-23 @ 21:37)  POCT Blood Glucose.: 167 mg/dL (02-06-23 @ 16:54)  POCT Blood Glucose.: 132 mg/dL (02-06-23 @ 11:33)  POCT Blood Glucose.: 137 mg/dL (02-06-23 @ 07:30)  POCT Blood Glucose.: 157 mg/dL (02-05-23 @ 21:47)  POCT Blood Glucose.: 210 mg/dL (02-05-23 @ 16:40)    Estimated Needs:   [X] no change since previous assessment  [ ] recalculated:     Previous Nutrition Diagnosis: Altered Nutrition Related Labs    Nutrition Diagnosis is [ ] ongoing  [ ] resolved [ ] not applicable     Monitoring and Evaluation:     [ x ] Tolerance to diet prescription / adequacy of meal intake  [ x ] Weight trends   [ x ] Pertinent labs    Recommendations:  1) Diet / Supplement Changes: Continue diet as ordered; Suggest Nepro 1x daily (425 kcals, 19.1g protein). Will provide extra portion of protein foods at breakfast meal.  2) Obtain and record current weights to best monitor for acute changes in nutritional status.  3) Please consistently document % meal intake in nursing flowsheets.    Rubi Spence, MS, RDN, CDN  Pager [phone]  Also available on MS Teams

## 2023-02-08 NOTE — PROGRESS NOTE ADULT - ASSESSMENT
47M with hx of ESRD on HD, PVD s/p bilateral BKA, T2DM who initially presented to [facility] with right finger swelling and pain, found to have steal syndrome transferred to [facility] for further management. s/p revision of AVF with vascular surgery 1/13, and s/p right 3rd finger amputation and repeated I&D with ortho (1/14 + 1/16 + 1/19 +1/25 and 2/2) s/p SICU stay for hemorrhagic show now back on floors with plan to RTOR today for VAC exchange.

## 2023-02-08 NOTE — PROGRESS NOTE ADULT - SUBJECTIVE AND OBJECTIVE BOX
Patient is a 47y Male whom presented to the hospital with esrd on hd     PAST MEDICAL & SURGICAL HISTORY:      MEDICATIONS  (STANDING):  dextrose 5%. 1000 milliLiter(s) (100 mL/Hr) IV Continuous <Continuous>  dextrose 5%. 1000 milliLiter(s) (50 mL/Hr) IV Continuous <Continuous>  dextrose 50% Injectable 25 Gram(s) IV Push once  dextrose 50% Injectable 25 Gram(s) IV Push once  dextrose 50% Injectable 12.5 Gram(s) IV Push once  glucagon  Injectable 1 milliGRAM(s) IntraMuscular once  insulin lispro (ADMELOG) corrective regimen sliding scale   SubCutaneous every 6 hours  pantoprazole    Tablet 40 milliGRAM(s) Oral daily  polyethylene glycol 3350 17 Gram(s) Oral daily  senna 2 Tablet(s) Oral at bedtime      Allergies    No Known Allergies    Intolerances        SOCIAL HISTORY:  Denies ETOh,Smoking,     FAMILY HISTORY:      REVIEW OF SYSTEMS:    CONSTITUTIONAL: No weakness, fevers or chills  NECK: No pain or stiffness  RESPIRATORY: No cough, wheezing, hemoptysis; No shortness of breath  CARDIOVASCULAR: No chest pain or palpitations  GASTROINTESTINAL: No abdominal or epigastric pain. No nausea, vomiting,                                                                                                       10.0   7.81  )-----------( 416      ( 08 Feb 2023 05:58 )             32.3       CBC Full  -  ( 08 Feb 2023 05:58 )  WBC Count : 7.81 K/uL  RBC Count : 3.62 M/uL  Hemoglobin : 10.0 g/dL  Hematocrit : 32.3 %  Platelet Count - Automated : 416 K/uL  Mean Cell Volume : 89.2 fL  Mean Cell Hemoglobin : 27.6 pg  Mean Cell Hemoglobin Concentration : 31.0 gm/dL  Auto Neutrophil # : x  Auto Lymphocyte # : x  Auto Monocyte # : x  Auto Eosinophil # : x  Auto Basophil # : x  Auto Neutrophil % : x  Auto Lymphocyte % : x  Auto Monocyte % : x  Auto Eosinophil % : x  Auto Basophil % : x      02-08    135  |  94<L>  |  43<H>  ----------------------------<  295<H>  4.9   |  17<L>  |  9.05<H>    Ca    8.9      08 Feb 2023 05:58  Phos  6.3     02-07  Mg     2.30     02-07        CAPILLARY BLOOD GLUCOSE      POCT Blood Glucose.: 312 mg/dL (08 Feb 2023 16:19)  POCT Blood Glucose.: 326 mg/dL (08 Feb 2023 16:17)  POCT Blood Glucose.: 231 mg/dL (08 Feb 2023 11:20)  POCT Blood Glucose.: 249 mg/dL (08 Feb 2023 07:26)  POCT Blood Glucose.: 162 mg/dL (07 Feb 2023 21:31)  POCT Blood Glucose.: 125 mg/dL (07 Feb 2023 19:42)      Vital Signs Last 24 Hrs  T(C): 36.3 (08 Feb 2023 18:11), Max: 37 (08 Feb 2023 09:00)  T(F): 97.4 (08 Feb 2023 18:11), Max: 98.6 (08 Feb 2023 09:00)  HR: 84 (08 Feb 2023 18:11) (71 - 84)  BP: 110/65 (08 Feb 2023 18:11) (110/65 - 166/57)  BP(mean): 78 (07 Feb 2023 20:30) (78 - 85)  RR: 18 (08 Feb 2023 18:11) (12 - 19)  SpO2: 97% (08 Feb 2023 18:11) (97% - 100%)    Parameters below as of 08 Feb 2023 18:11  Patient On (Oxygen Delivery Method): room air            PT/INR - ( 07 Feb 2023 04:00 )   PT: 12.8 sec;   INR: 1.10 ratio         PTT - ( 07 Feb 2023 04:00 )  PTT:29.7 sec      PHYSICAL EXAM:    Constitutional: NAD  HEENT: conjunctive   clear   Neck:  No JVD  Respiratory: CTAB  Cardiovascular: S1 and S2  Gastrointestinal: BS+, soft, NT/ND   right hand dressed. bilateral BKA   right arm AVF nontender

## 2023-02-08 NOTE — PROGRESS NOTE ADULT - NUTRITIONAL ASSESSMENT
MEDICATIONS  (STANDING):  acetaminophen     Tablet .. 975 milliGRAM(s) Oral every 8 hours  ampicillin/sulbactam  IVPB 3 Gram(s) IV Intermittent every 24 hours  aspirin  chewable 81 milliGRAM(s) Oral daily  chlorhexidine 2% Cloths 1 Application(s) Topical <User Schedule>  dorzolamide 2%/timolol 0.5% Ophthalmic Solution 1 Drop(s) Both EYES two times a day  epoetin deidre-epbx (RETACRIT) Injectable 70963 Unit(s) IV Push <User Schedule>  insulin lispro (ADMELOG) corrective regimen sliding scale   SubCutaneous at bedtime  insulin lispro (ADMELOG) corrective regimen sliding scale   SubCutaneous three times a day before meals  midodrine. 10 milliGRAM(s) Oral three times a day  multivitamin 1 Tablet(s) Oral daily

## 2023-02-09 LAB
GLUCOSE BLDC GLUCOMTR-MCNC: 175 MG/DL — HIGH (ref 70–99)
GLUCOSE BLDC GLUCOMTR-MCNC: 198 MG/DL — HIGH (ref 70–99)
GLUCOSE BLDC GLUCOMTR-MCNC: 209 MG/DL — HIGH (ref 70–99)
GLUCOSE BLDC GLUCOMTR-MCNC: 218 MG/DL — HIGH (ref 70–99)

## 2023-02-09 PROCEDURE — 99233 SBSQ HOSP IP/OBS HIGH 50: CPT

## 2023-02-09 RX ADMIN — OXYCODONE HYDROCHLORIDE 10 MILLIGRAM(S): 5 TABLET ORAL at 22:14

## 2023-02-09 RX ADMIN — HEPARIN SODIUM 5000 UNIT(S): 5000 INJECTION INTRAVENOUS; SUBCUTANEOUS at 22:03

## 2023-02-09 RX ADMIN — DORZOLAMIDE HYDROCHLORIDE TIMOLOL MALEATE 1 DROP(S): 20; 5 SOLUTION/ DROPS OPHTHALMIC at 18:20

## 2023-02-09 RX ADMIN — MIDODRINE HYDROCHLORIDE 10 MILLIGRAM(S): 2.5 TABLET ORAL at 06:42

## 2023-02-09 RX ADMIN — Medication 975 MILLIGRAM(S): at 07:41

## 2023-02-09 RX ADMIN — OXYCODONE HYDROCHLORIDE 5 MILLIGRAM(S): 5 TABLET ORAL at 01:45

## 2023-02-09 RX ADMIN — OXYCODONE HYDROCHLORIDE 5 MILLIGRAM(S): 5 TABLET ORAL at 07:19

## 2023-02-09 RX ADMIN — DORZOLAMIDE HYDROCHLORIDE TIMOLOL MALEATE 1 DROP(S): 20; 5 SOLUTION/ DROPS OPHTHALMIC at 06:42

## 2023-02-09 RX ADMIN — Medication 2: at 16:41

## 2023-02-09 RX ADMIN — Medication 975 MILLIGRAM(S): at 00:51

## 2023-02-09 RX ADMIN — Medication 975 MILLIGRAM(S): at 17:10

## 2023-02-09 RX ADMIN — OXYCODONE HYDROCHLORIDE 10 MILLIGRAM(S): 5 TABLET ORAL at 11:56

## 2023-02-09 RX ADMIN — OXYCODONE HYDROCHLORIDE 5 MILLIGRAM(S): 5 TABLET ORAL at 06:40

## 2023-02-09 RX ADMIN — Medication 975 MILLIGRAM(S): at 00:31

## 2023-02-09 RX ADMIN — HEPARIN SODIUM 5000 UNIT(S): 5000 INJECTION INTRAVENOUS; SUBCUTANEOUS at 06:43

## 2023-02-09 RX ADMIN — INSULIN GLARGINE 5 UNIT(S): 100 INJECTION, SOLUTION SUBCUTANEOUS at 00:26

## 2023-02-09 RX ADMIN — MIDODRINE HYDROCHLORIDE 10 MILLIGRAM(S): 2.5 TABLET ORAL at 14:20

## 2023-02-09 RX ADMIN — HEPARIN SODIUM 5000 UNIT(S): 5000 INJECTION INTRAVENOUS; SUBCUTANEOUS at 14:15

## 2023-02-09 RX ADMIN — Medication 81 MILLIGRAM(S): at 11:52

## 2023-02-09 RX ADMIN — Medication 1: at 07:41

## 2023-02-09 RX ADMIN — OXYCODONE HYDROCHLORIDE 10 MILLIGRAM(S): 5 TABLET ORAL at 12:53

## 2023-02-09 RX ADMIN — Medication 1: at 11:52

## 2023-02-09 RX ADMIN — Medication 975 MILLIGRAM(S): at 08:30

## 2023-02-09 RX ADMIN — CHLORHEXIDINE GLUCONATE 1 APPLICATION(S): 213 SOLUTION TOPICAL at 11:51

## 2023-02-09 RX ADMIN — AMPICILLIN SODIUM AND SULBACTAM SODIUM 200 GRAM(S): 250; 125 INJECTION, POWDER, FOR SUSPENSION INTRAMUSCULAR; INTRAVENOUS at 02:13

## 2023-02-09 RX ADMIN — Medication 975 MILLIGRAM(S): at 16:40

## 2023-02-09 RX ADMIN — OXYCODONE HYDROCHLORIDE 10 MILLIGRAM(S): 5 TABLET ORAL at 23:10

## 2023-02-09 RX ADMIN — OXYCODONE HYDROCHLORIDE 5 MILLIGRAM(S): 5 TABLET ORAL at 00:51

## 2023-02-09 RX ADMIN — INSULIN GLARGINE 5 UNIT(S): 100 INJECTION, SOLUTION SUBCUTANEOUS at 22:07

## 2023-02-09 NOTE — PROGRESS NOTE ADULT - SUBJECTIVE AND OBJECTIVE BOX
CHIEF COMPLAINT: f/u     SUBJECTIVE / OVERNIGHT EVENTS: Patient seen and examined. No acute events overnight. Pain well controlled and patient without any complaints.    MEDICATIONS  (STANDING):  acetaminophen     Tablet .. 975 milliGRAM(s) Oral every 8 hours  ampicillin/sulbactam  IVPB 3 Gram(s) IV Intermittent every 24 hours  aspirin  chewable 81 milliGRAM(s) Oral daily  chlorhexidine 2% Cloths 1 Application(s) Topical <User Schedule>  dextrose 5%. 1000 milliLiter(s) (50 mL/Hr) IV Continuous <Continuous>  dextrose 5%. 1000 milliLiter(s) (100 mL/Hr) IV Continuous <Continuous>  dextrose 50% Injectable 25 Gram(s) IV Push once  dextrose 50% Injectable 12.5 Gram(s) IV Push once  dextrose 50% Injectable 25 Gram(s) IV Push once  dorzolamide 2%/timolol 0.5% Ophthalmic Solution 1 Drop(s) Both EYES two times a day  epoetin deidre-epbx (RETACRIT) Injectable 28512 Unit(s) IV Push <User Schedule>  glucagon  Injectable 1 milliGRAM(s) IntraMuscular once  heparin   Injectable 5000 Unit(s) SubCutaneous every 8 hours  insulin glargine Injectable (LANTUS) 5 Unit(s) SubCutaneous at bedtime  insulin lispro (ADMELOG) corrective regimen sliding scale   SubCutaneous three times a day before meals  insulin lispro (ADMELOG) corrective regimen sliding scale   SubCutaneous at bedtime  lactated ringers. 1000 milliLiter(s) (75 mL/Hr) IV Continuous <Continuous>  midodrine. 10 milliGRAM(s) Oral three times a day  multivitamin 1 Tablet(s) Oral daily    MEDICATIONS  (PRN):  bisacodyl Suppository 10 milliGRAM(s) Rectal daily PRN If no bowel movement  dextrose Oral Gel 15 Gram(s) Oral once PRN Blood Glucose LESS THAN 70 milliGRAM(s)/deciliter  lactulose Syrup 10 Gram(s) Oral daily PRN Constipation  oxyCODONE    IR 5 milliGRAM(s) Oral every 4 hours PRN Moderate Pain (4 - 6)  oxyCODONE    IR 10 milliGRAM(s) Oral every 4 hours PRN Severe Pain (7 - 10)    VITALS:  T(F): 98.2 (02-09-23 @ 09:29), Max: 98.7 (02-09-23 @ 00:34)  HR: 71 (02-09-23 @ 09:29) (65 - 84)  BP: 121/64 (02-09-23 @ 09:29) (108/58 - 144/66)  RR: 18 (02-09-23 @ 09:29) (17 - 18)  SpO2: 100% (02-09-23 @ 09:29)    PHYSICAL EXAM:  CONSTITUTIONAL: NAD, well-developed, well-groomed  RESPIRATORY: Normal respiratory effort; lungs are clear to auscultation bilaterally  CARDIOVASCULAR: Regular rate and rhythm, normal S1 and S2, no murmur/rub/gallop; No lower extremity edema  GASTROINTESTINAL: Nontender to palpation, normoactive bowel sounds, no rebound/guarding; No hepatosplenomegaly  MUSCULOSKELETAL:  no clubbing or cyanosis of digits; s/p B/L BKA , R hand wrapped (s/p amputation of third finger)  SKIN: No rashes; warm    LABS:              10.0                 135  | 17   | 43           7.81  >-----------< 416     ------------------------< 295                   32.3                 4.9  | 94   | 9.05                                         Ca 8.9   Mg x     Ph x        CAPILLARY BLOOD GLUCOSE  POCT Blood Glucose.: 175 mg/dL (09 Feb 2023 11:16)  POCT Blood Glucose.: 198 mg/dL (09 Feb 2023 07:18)  POCT Blood Glucose.: 166 mg/dL (08 Feb 2023 23:48)  POCT Blood Glucose.: 256 mg/dL (08 Feb 2023 21:10)  POCT Blood Glucose.: 312 mg/dL (08 Feb 2023 16:19)  POCT Blood Glucose.: 326 mg/dL (08 Feb 2023 16:17)    [ ] Consultant(s) Notes Reviewed:  [x] Care Discussed with Consultants/Other Providers: Orthopedic PA - discussed management of diabetes CHIEF COMPLAINT: f/u steal syndrome    SUBJECTIVE / OVERNIGHT EVENTS: Patient seen and examined. No acute events overnight. Pain well controlled and patient without any complaints.    MEDICATIONS  (STANDING):  acetaminophen     Tablet .. 975 milliGRAM(s) Oral every 8 hours  ampicillin/sulbactam  IVPB 3 Gram(s) IV Intermittent every 24 hours  aspirin  chewable 81 milliGRAM(s) Oral daily  chlorhexidine 2% Cloths 1 Application(s) Topical <User Schedule>  dextrose 5%. 1000 milliLiter(s) (50 mL/Hr) IV Continuous <Continuous>  dextrose 5%. 1000 milliLiter(s) (100 mL/Hr) IV Continuous <Continuous>  dextrose 50% Injectable 25 Gram(s) IV Push once  dextrose 50% Injectable 12.5 Gram(s) IV Push once  dextrose 50% Injectable 25 Gram(s) IV Push once  dorzolamide 2%/timolol 0.5% Ophthalmic Solution 1 Drop(s) Both EYES two times a day  epoetin deidre-epbx (RETACRIT) Injectable 38516 Unit(s) IV Push <User Schedule>  glucagon  Injectable 1 milliGRAM(s) IntraMuscular once  heparin   Injectable 5000 Unit(s) SubCutaneous every 8 hours  insulin glargine Injectable (LANTUS) 5 Unit(s) SubCutaneous at bedtime  insulin lispro (ADMELOG) corrective regimen sliding scale   SubCutaneous three times a day before meals  insulin lispro (ADMELOG) corrective regimen sliding scale   SubCutaneous at bedtime  lactated ringers. 1000 milliLiter(s) (75 mL/Hr) IV Continuous <Continuous>  midodrine. 10 milliGRAM(s) Oral three times a day  multivitamin 1 Tablet(s) Oral daily    MEDICATIONS  (PRN):  bisacodyl Suppository 10 milliGRAM(s) Rectal daily PRN If no bowel movement  dextrose Oral Gel 15 Gram(s) Oral once PRN Blood Glucose LESS THAN 70 milliGRAM(s)/deciliter  lactulose Syrup 10 Gram(s) Oral daily PRN Constipation  oxyCODONE    IR 5 milliGRAM(s) Oral every 4 hours PRN Moderate Pain (4 - 6)  oxyCODONE    IR 10 milliGRAM(s) Oral every 4 hours PRN Severe Pain (7 - 10)    VITALS:  T(F): 98.2 (02-09-23 @ 09:29), Max: 98.7 (02-09-23 @ 00:34)  HR: 71 (02-09-23 @ 09:29) (65 - 84)  BP: 121/64 (02-09-23 @ 09:29) (108/58 - 144/66)  RR: 18 (02-09-23 @ 09:29) (17 - 18)  SpO2: 100% (02-09-23 @ 09:29)    PHYSICAL EXAM:  CONSTITUTIONAL: NAD, well-developed, well-groomed  RESPIRATORY: Normal respiratory effort; lungs are clear to auscultation bilaterally  CARDIOVASCULAR: Regular rate and rhythm, normal S1 and S2, no murmur/rub/gallop; No lower extremity edema  GASTROINTESTINAL: Nontender to palpation, normoactive bowel sounds, no rebound/guarding; No hepatosplenomegaly  MUSCULOSKELETAL:  no clubbing or cyanosis of digits; s/p B/L BKA , R hand wrapped (s/p amputation of third finger)  SKIN: No rashes; warm    LABS:              10.0                 135  | 17   | 43           7.81  >-----------< 416     ------------------------< 295                   32.3                 4.9  | 94   | 9.05                                         Ca 8.9   Mg x     Ph x        CAPILLARY BLOOD GLUCOSE  POCT Blood Glucose.: 175 mg/dL (09 Feb 2023 11:16)  POCT Blood Glucose.: 198 mg/dL (09 Feb 2023 07:18)  POCT Blood Glucose.: 166 mg/dL (08 Feb 2023 23:48)  POCT Blood Glucose.: 256 mg/dL (08 Feb 2023 21:10)  POCT Blood Glucose.: 312 mg/dL (08 Feb 2023 16:19)  POCT Blood Glucose.: 326 mg/dL (08 Feb 2023 16:17)    [ ] Consultant(s) Notes Reviewed:  [x] Care Discussed with Consultants/Other Providers: Orthopedic PA - discussed management of diabetes

## 2023-02-09 NOTE — PROGRESS NOTE ADULT - SUBJECTIVE AND OBJECTIVE BOX
ORTHO PROGRESS NOTE     Pt seen and examined at bedside, denies SOB, CP, Dizziness. N/V/D /HA.  No significant overnight events. Pain well controlled.    Vital Signs Last 24 Hrs  T(C): 36.6 (09 Feb 2023 01:15), Max: 37.1 (09 Feb 2023 00:34)  T(F): 97.8 (09 Feb 2023 01:15), Max: 98.7 (09 Feb 2023 00:34)  HR: 74 (09 Feb 2023 01:15) (65 - 84)  BP: 108/58 (09 Feb 2023 01:15) (108/58 - 166/57)  BP(mean): --  RR: 18 (09 Feb 2023 01:15) (17 - 18)  SpO2: 100% (09 Feb 2023 01:15) (97% - 100%)    Parameters below as of 09 Feb 2023 01:15  Patient On (Oxygen Delivery Method): room air      Exam:  Gen: NAD, resting comfortably  Resp: Nonlabored  R UE: vac in place with good seal, SILT  Able to spontaneously move fingers      Labs:  CBC Full  -  ( 08 Feb 2023 05:58 )  WBC Count : 7.81 K/uL  RBC Count : 3.62 M/uL  Hemoglobin : 10.0 g/dL  Hematocrit : 32.3 %  Platelet Count - Automated : 416 K/uL  Mean Cell Volume : 89.2 fL  Mean Cell Hemoglobin : 27.6 pg  Mean Cell Hemoglobin Concentration : 31.0 gm/dL  Auto Neutrophil # : x  Auto Lymphocyte # : x  Auto Monocyte # : x  Auto Eosinophil # : x  Auto Basophil # : x  Auto Neutrophil % : x  Auto Lymphocyte % : x  Auto Monocyte % : x  Auto Eosinophil % : x  Auto Basophil % : x      02-08    135  |  94<L>  |  43<H>  ----------------------------<  295<H>  4.9   |  17<L>  |  9.05<H>    Ca    8.9      08 Feb 2023 05:58        47y y/o Male 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 Repeat I+D on 1/14, 1/16, 1/19, 1/25, 2/7.                   PT- nonweight bearing right upper extremity          Pain control          DVT prophylaxis- ASA daily/ venodynes           Appreciate ID recs          Appreciate ophthalmology recs: outpatient follow-up          Dispo: MARYCHUY    Orthopaedic Surgery  [facility] [id number]  [facility]        [id number]  [facility]  [id number]/ [phone]

## 2023-02-09 NOTE — PROGRESS NOTE ADULT - ASSESSMENT
47M with hx of ESRD on HD, PVD s/p bilateral BKA, T2DM who initially presented to [facility] with right finger swelling and pain, found to have steal syndrome transferred to [facility] for further management. s/p revision of AVF with vascular surgery 1/13, and s/p right 3rd finger amputation and repeated I&D with ortho (1/14 + 1/16 + 1/19 +1/25 + 2/2 and 2/7) s/p SICU stay for hemorrhagic show now back on floors with plan to RTOR on Thurs 2/16.

## 2023-02-09 NOTE — PROGRESS NOTE ADULT - ASSESSMENT
Patient is a 48 y/o male PMH DM2, Bilateral BKA, ESRD (on HD MWF), last dialyzed yesterday transferred from [facility] emergently for evaluation of evaluation of right finger swelling/pain. Patient reports history of right finger pain after he had a fall one year ago. Patient had a wound on her second/middle finger who he was seeing a hand surgeon for outpatient but unable ultimately to continue seeing due to insurance issues. Patient reports his middle finger developed increased swelling and became black in color about two weeks ago. Denies fevers. Patient transferred to [facility] for further evaluation and emergent OR. Patient received broad spectrum Abx of Zosyn/vanco/clindamycin at [place]. Interpretor phone used for translation with patient. ID# [id number]        f/u  blood and urine cx,serial lactate levels,monitor vitals closley,ivfs hydration,monitor urine output and renal profile,iv abx as per id cons  ampicillin/sulbactam  IVPB 3 Gram(s) IV Intermittent every 24 hours    esrd on hd mwf   Excess fluids and waste products will be removed from your blood; your electrolytes will be balanced; your blood pressure will be controlled.    BP monitoring,continue current antihypertensive meds, low salt diet,followup with PMD in 1-2 weeks      ANEMIA PLAN:  Anemia of chronic disease:  Well controlled by epo  H and H subtherapeutic .  We will continue epo aiming for a HCT of 32-36 %.   We will monitor Iron stores, B12 and RBC folate .  epoetin deidre-epbx (RETACRIT) Injectable 49961 Unit(s) IV Push     Accuchecks monitoring and insulin sliding scale coverage, no concentrated sweets diet, serial labs and f/up with PMD, monitor HB A 1 C every 3-4 mnth  piperacillin/tazobactam IVPB.. 3.375 Gram(s) IV Intermittent every 12 hours

## 2023-02-09 NOTE — PROGRESS NOTE ADULT - SUBJECTIVE AND OBJECTIVE BOX
Patient is a 47y Male whom presented to the hospital with esrd on hd     PAST MEDICAL & SURGICAL HISTORY:      MEDICATIONS  (STANDING):  dextrose 5%. 1000 milliLiter(s) (100 mL/Hr) IV Continuous <Continuous>  dextrose 5%. 1000 milliLiter(s) (50 mL/Hr) IV Continuous <Continuous>  dextrose 50% Injectable 25 Gram(s) IV Push once  dextrose 50% Injectable 25 Gram(s) IV Push once  dextrose 50% Injectable 12.5 Gram(s) IV Push once  glucagon  Injectable 1 milliGRAM(s) IntraMuscular once  insulin lispro (ADMELOG) corrective regimen sliding scale   SubCutaneous every 6 hours  pantoprazole    Tablet 40 milliGRAM(s) Oral daily  polyethylene glycol 3350 17 Gram(s) Oral daily  senna 2 Tablet(s) Oral at bedtime      Allergies    No Known Allergies    Intolerances        SOCIAL HISTORY:  Denies ETOh,Smoking,     FAMILY HISTORY:      REVIEW OF SYSTEMS:    CONSTITUTIONAL: No weakness, fevers or chills  NECK: No pain or stiffness  RESPIRATORY: No cough, wheezing, hemoptysis; No shortness of breath  CARDIOVASCULAR: No chest pain or palpitations  GASTROINTESTINAL: No abdominal or epigastric pain. No nausea, vomiting,                                                                     10.0   7.81  )-----------( 416      ( 08 Feb 2023 05:58 )             32.3       CBC Full  -  ( 08 Feb 2023 05:58 )  WBC Count : 7.81 K/uL  RBC Count : 3.62 M/uL  Hemoglobin : 10.0 g/dL  Hematocrit : 32.3 %  Platelet Count - Automated : 416 K/uL  Mean Cell Volume : 89.2 fL  Mean Cell Hemoglobin : 27.6 pg  Mean Cell Hemoglobin Concentration : 31.0 gm/dL  Auto Neutrophil # : x  Auto Lymphocyte # : x  Auto Monocyte # : x  Auto Eosinophil # : x  Auto Basophil # : x  Auto Neutrophil % : x  Auto Lymphocyte % : x  Auto Monocyte % : x  Auto Eosinophil % : x  Auto Basophil % : x      02-08    135  |  94<L>  |  43<H>  ----------------------------<  295<H>  4.9   |  17<L>  |  9.05<H>    Ca    8.9      08 Feb 2023 05:58        CAPILLARY BLOOD GLUCOSE      POCT Blood Glucose.: 218 mg/dL (09 Feb 2023 16:37)  POCT Blood Glucose.: 175 mg/dL (09 Feb 2023 11:16)  POCT Blood Glucose.: 198 mg/dL (09 Feb 2023 07:18)  POCT Blood Glucose.: 166 mg/dL (08 Feb 2023 23:48)  POCT Blood Glucose.: 256 mg/dL (08 Feb 2023 21:10)      Vital Signs Last 24 Hrs  T(C): 36.3 (09 Feb 2023 17:28), Max: 37.1 (09 Feb 2023 00:34)  T(F): 97.3 (09 Feb 2023 17:28), Max: 98.7 (09 Feb 2023 00:34)  HR: 61 (09 Feb 2023 17:28) (61 - 74)  BP: 140/64 (09 Feb 2023 17:28) (108/58 - 144/66)  BP(mean): --  RR: 18 (09 Feb 2023 17:28) (18 - 18)  SpO2: 100% (09 Feb 2023 17:28) (100% - 100%)    Parameters below as of 09 Feb 2023 17:28  Patient On (Oxygen Delivery Method): room air                PHYSICAL EXAM:    Constitutional: NAD  HEENT: conjunctive   clear   Neck:  No JVD  Respiratory: CTAB  Cardiovascular: S1 and S2  Gastrointestinal: BS+, soft, NT/ND   right hand dressed. bilateral BKA   right arm AVF nontender

## 2023-02-09 NOTE — PROGRESS NOTE ADULT - PROBLEM SELECTOR PLAN 1
-Pt anticipated to RTOR for repeat I&D next week (2/16/23)  -RCRI = 2 (Class III) connoting 10.1% 30-day risk of major adverse cardiac event.   -Pt is without acute cardiac condition and tolerated previous I&Ds (1/14 + 1/16 + 1/19 + 1/25 + 2/2 + 2/7). Patient has no signs or symptoms of ACS/decompensated HF. TTE 1/13 showed EF 62%, nl LV function, mild MS. Medically optimized to proceed to OR.    -As workup is unlikely to , pt may RTOR for repeat I&D and/or VAC exchange without further cardiac testing.

## 2023-02-09 NOTE — PROGRESS NOTE ADULT - PROBLEM SELECTOR PLAN 3
right 3rd digit gangrene d/t steal syndrome iso of RUE AVF  - management per ortho + vascular surgery   - s/p RUE fistula repair with vascular surgery on 1/13   - s/p right 3rd finger amputation and repeated I&D with ortho (1/14 + 1/16 + 1/19 + 1/25, 2/2 and  2/7 )  - ID recs appreciated: wound cultures grew polymicrobial E coli, Strep, Bacteroides  - ID recommended unasyn for continued course given open wounds   - S/P OR this past Tuesday 2/7 - tolerated procedure well  - pain control: ATC Tylenol, Dilaudid 2mg q4h PRN mild pain, 4mg q4h PRN moderate pain, 8mg q4h PRN severe pain, bowel regimen while on opiates

## 2023-02-09 NOTE — PROGRESS NOTE ADULT - NUTRITIONAL ASSESSMENT
MEDICATIONS  (STANDING):  acetaminophen     Tablet .. 975 milliGRAM(s) Oral every 8 hours  ampicillin/sulbactam  IVPB 3 Gram(s) IV Intermittent every 24 hours  aspirin  chewable 81 milliGRAM(s) Oral daily  chlorhexidine 2% Cloths 1 Application(s) Topical <User Schedule>  dorzolamide 2%/timolol 0.5% Ophthalmic Solution 1 Drop(s) Both EYES two times a day  epoetin deidre-epbx (RETACRIT) Injectable 17539 Unit(s) IV Push <User Schedule>  insulin lispro (ADMELOG) corrective regimen sliding scale   SubCutaneous at bedtime  insulin lispro (ADMELOG) corrective regimen sliding scale   SubCutaneous three times a day before meals  midodrine. 10 milliGRAM(s) Oral three times a day  multivitamin 1 Tablet(s) Oral daily

## 2023-02-09 NOTE — PROGRESS NOTE ADULT - PROBLEM SELECTOR PLAN 4
- YaW8o=8.1%. FS well controlled. Continue with ISS for now and continue to monitor FS closely.   - Home regimen: Basaglar 5U qhs + Humalog 4U TID qac  - c/w lantus 5unit subq qhs in-house when not NPO

## 2023-02-10 LAB
GLUCOSE BLDC GLUCOMTR-MCNC: 128 MG/DL — HIGH (ref 70–99)
GLUCOSE BLDC GLUCOMTR-MCNC: 130 MG/DL — HIGH (ref 70–99)
GLUCOSE BLDC GLUCOMTR-MCNC: 145 MG/DL — HIGH (ref 70–99)
GLUCOSE BLDC GLUCOMTR-MCNC: 185 MG/DL — HIGH (ref 70–99)

## 2023-02-10 PROCEDURE — 99232 SBSQ HOSP IP/OBS MODERATE 35: CPT

## 2023-02-10 RX ADMIN — MIDODRINE HYDROCHLORIDE 10 MILLIGRAM(S): 2.5 TABLET ORAL at 06:48

## 2023-02-10 RX ADMIN — ERYTHROPOIETIN 10000 UNIT(S): 10000 INJECTION, SOLUTION INTRAVENOUS; SUBCUTANEOUS at 22:28

## 2023-02-10 RX ADMIN — Medication 1: at 16:36

## 2023-02-10 RX ADMIN — DORZOLAMIDE HYDROCHLORIDE TIMOLOL MALEATE 1 DROP(S): 20; 5 SOLUTION/ DROPS OPHTHALMIC at 21:10

## 2023-02-10 RX ADMIN — Medication 975 MILLIGRAM(S): at 01:17

## 2023-02-10 RX ADMIN — Medication 81 MILLIGRAM(S): at 13:18

## 2023-02-10 RX ADMIN — HEPARIN SODIUM 5000 UNIT(S): 5000 INJECTION INTRAVENOUS; SUBCUTANEOUS at 06:49

## 2023-02-10 RX ADMIN — HEPARIN SODIUM 5000 UNIT(S): 5000 INJECTION INTRAVENOUS; SUBCUTANEOUS at 13:18

## 2023-02-10 RX ADMIN — MIDODRINE HYDROCHLORIDE 10 MILLIGRAM(S): 2.5 TABLET ORAL at 13:18

## 2023-02-10 RX ADMIN — CHLORHEXIDINE GLUCONATE 1 APPLICATION(S): 213 SOLUTION TOPICAL at 13:17

## 2023-02-10 RX ADMIN — OXYCODONE HYDROCHLORIDE 10 MILLIGRAM(S): 5 TABLET ORAL at 21:14

## 2023-02-10 RX ADMIN — AMPICILLIN SODIUM AND SULBACTAM SODIUM 200 GRAM(S): 250; 125 INJECTION, POWDER, FOR SUSPENSION INTRAMUSCULAR; INTRAVENOUS at 02:00

## 2023-02-10 RX ADMIN — DORZOLAMIDE HYDROCHLORIDE TIMOLOL MALEATE 1 DROP(S): 20; 5 SOLUTION/ DROPS OPHTHALMIC at 06:48

## 2023-02-10 NOTE — PROGRESS NOTE ADULT - PROBLEM SELECTOR PLAN 4
- MzR6z=2.1%. FS well controlled. Continue with ISS for now and continue to monitor FS closely.  - sugars well controlled on currently regimen Lantus 5 units qhs   - Home regimen: Basaglar 5U qhs + Humalog 4U TID qac  - c/w lantus 5unit subq qhs in-house when not NPO

## 2023-02-10 NOTE — PROGRESS NOTE ADULT - NUTRITIONAL ASSESSMENT
MEDICATIONS  (STANDING):  acetaminophen     Tablet .. 975 milliGRAM(s) Oral every 8 hours  ampicillin/sulbactam  IVPB 3 Gram(s) IV Intermittent every 24 hours  aspirin  chewable 81 milliGRAM(s) Oral daily  chlorhexidine 2% Cloths 1 Application(s) Topical <User Schedule>  dorzolamide 2%/timolol 0.5% Ophthalmic Solution 1 Drop(s) Both EYES two times a day  epoetin deidre-epbx (RETACRIT) Injectable 21697 Unit(s) IV Push <User Schedule>  insulin lispro (ADMELOG) corrective regimen sliding scale   SubCutaneous at bedtime  insulin lispro (ADMELOG) corrective regimen sliding scale   SubCutaneous three times a day before meals  midodrine. 10 milliGRAM(s) Oral three times a day  multivitamin 1 Tablet(s) Oral daily

## 2023-02-10 NOTE — PROGRESS NOTE ADULT - SUBJECTIVE AND OBJECTIVE BOX
ORTHO PROGRESS NOTE     Pt seen and examined at bedside, denies SOB, CP, Dizziness. N/V/D /HA.  No significant overnight events. Pain well controlled.    Vital Signs Last 24 Hrs  T(C): 36.3 (10 Feb 2023 01:52), Max: 37.2 (09 Feb 2023 21:32)  T(F): 97.4 (10 Feb 2023 01:52), Max: 98.9 (09 Feb 2023 21:32)  HR: 58 (10 Feb 2023 01:52) (58 - 71)  BP: 111/60 (10 Feb 2023 01:52) (104/58 - 140/64)  BP(mean): --  RR: 17 (10 Feb 2023 01:52) (17 - 18)  SpO2: 100% (10 Feb 2023 01:52) (100% - 100%)    Parameters below as of 10 Feb 2023 01:52  Patient On (Oxygen Delivery Method): room air      Exam:  Gen: NAD, resting comfortably  Resp: Nonlabored  R UE: vac in place with good seal, SILT  Able to spontaneously move fingers        Labs:              47y y/o Male 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 Repeat I+D on 1/14, 1/16, 1/19, 1/25, 2/7.                   PT- nonweight bearing right upper extremity          Pain control          DVT prophylaxis- ASA daily/ venodynes           Appreciate ID recs          Appreciate ophthalmology recs: outpatient follow-up          Dispo: TBD    Orthopaedic Surgery  [facility] [place] [id number]  [facility]        [id number]  [facility]  [id number]/ [phone]

## 2023-02-10 NOTE — PROGRESS NOTE ADULT - SUBJECTIVE AND OBJECTIVE BOX
Patient is a 47y Male whom presented to the hospital with esrd on hd     PAST MEDICAL & SURGICAL HISTORY:      MEDICATIONS  (STANDING):  dextrose 5%. 1000 milliLiter(s) (100 mL/Hr) IV Continuous <Continuous>  dextrose 5%. 1000 milliLiter(s) (50 mL/Hr) IV Continuous <Continuous>  dextrose 50% Injectable 25 Gram(s) IV Push once  dextrose 50% Injectable 25 Gram(s) IV Push once  dextrose 50% Injectable 12.5 Gram(s) IV Push once  glucagon  Injectable 1 milliGRAM(s) IntraMuscular once  insulin lispro (ADMELOG) corrective regimen sliding scale   SubCutaneous every 6 hours  pantoprazole    Tablet 40 milliGRAM(s) Oral daily  polyethylene glycol 3350 17 Gram(s) Oral daily  senna 2 Tablet(s) Oral at bedtime      Allergies    No Known Allergies    Intolerances        SOCIAL HISTORY:  Denies ETOh,Smoking,     FAMILY HISTORY:      REVIEW OF SYSTEMS:    CONSTITUTIONAL: No weakness, fevers or chills  NECK: No pain or stiffness  RESPIRATORY: No cough, wheezing, hemoptysis; No shortness of breath  CARDIOVASCULAR: No chest pain or palpitations  GASTROINTESTINAL: No abdominal or epigastric pain. No nausea, vomiting,                                                                               CAPILLARY BLOOD GLUCOSE      POCT Blood Glucose.: 185 mg/dL (10 Feb 2023 16:18)  POCT Blood Glucose.: 128 mg/dL (10 Feb 2023 11:17)  POCT Blood Glucose.: 145 mg/dL (10 Feb 2023 07:31)  POCT Blood Glucose.: 209 mg/dL (09 Feb 2023 21:51)      Vital Signs Last 24 Hrs  T(C): 36.4 (10 Feb 2023 18:03), Max: 37.2 (09 Feb 2023 21:32)  T(F): 97.5 (10 Feb 2023 18:03), Max: 98.9 (09 Feb 2023 21:32)  HR: 94 (10 Feb 2023 18:03) (58 - 94)  BP: 101/55 (10 Feb 2023 18:06) (94/52 - 130/64)  BP(mean): --  RR: 18 (10 Feb 2023 18:03) (16 - 18)  SpO2: 100% (10 Feb 2023 18:03) (97% - 100%)    Parameters below as of 10 Feb 2023 18:03  Patient On (Oxygen Delivery Method): room air                        PHYSICAL EXAM:    Constitutional: NAD  HEENT: conjunctive   clear   Neck:  No JVD  Respiratory: CTAB  Cardiovascular: S1 and S2  Gastrointestinal: BS+, soft, NT/ND   right hand dressed. bilateral BKA   right arm AVF nontender

## 2023-02-10 NOTE — PROGRESS NOTE ADULT - ASSESSMENT
47M with hx of ESRD on HD, PVD s/p bilateral BKA, T2DM who initially presented to [facility] with right finger swelling and pain, found to have steal syndrome transferred to [facility] for further management. s/p revision of AVF with vascular surgery 1/13, and s/p right 3rd finger amputation and repeated I&D with ortho (1/14 + 1/16 + 1/19 +1/25 + 2/2 and 2/7) s/p SICU stay for hemorrhagic show now back on floors with plan to RTOR on Thurs 2/16.

## 2023-02-10 NOTE — PROGRESS NOTE ADULT - PROBLEM SELECTOR PLAN 3
right 3rd digit gangrene d/t steal syndrome iso of RUE AVF  - management per ortho + vascular surgery   - s/p RUE fistula repair with vascular surgery on 1/13   - s/p right 3rd finger amputation and repeated I&D with ortho (1/14 + 1/16 + 1/19 + 1/25, 2/2 and  2/7 )  - ID recs appreciated: wound cultures grew polymicrobial E coli, Strep, Bacteroides  - ID recommended unasyn for continued course given open wounds, plan to continue 6 weeks of antibiotics tll 2/21 ( Unasyn while inpatient and Augmentin if discharged)   - S/P OR this past Tuesday 2/7 - tolerated procedure well  - pain control: ATC Tylenol, Dilaudid 2mg q4h PRN mild pain, 4mg q4h PRN moderate pain, 8mg q4h PRN severe pain, bowel regimen while on opiates

## 2023-02-10 NOTE — PROGRESS NOTE ADULT - SUBJECTIVE AND OBJECTIVE BOX
[facility] Division of Hospital Medicine  Yarelyserg Michael BAZZI  Pager [phone]  Available via MS Teams    SUBJECTIVE / OVERNIGHT EVENTS: Patient doing well this AM. Denies any CP, fevers, chills or headaches. States he has some right arm pain near surgical site.     ADDITIONAL REVIEW OF SYSTEMS:    MEDICATIONS  (STANDING):  acetaminophen     Tablet .. 975 milliGRAM(s) Oral every 8 hours  ampicillin/sulbactam  IVPB 3 Gram(s) IV Intermittent every 24 hours  aspirin  chewable 81 milliGRAM(s) Oral daily  chlorhexidine 2% Cloths 1 Application(s) Topical <User Schedule>  dextrose 5%. 1000 milliLiter(s) (50 mL/Hr) IV Continuous <Continuous>  dextrose 5%. 1000 milliLiter(s) (100 mL/Hr) IV Continuous <Continuous>  dextrose 50% Injectable 25 Gram(s) IV Push once  dextrose 50% Injectable 12.5 Gram(s) IV Push once  dextrose 50% Injectable 25 Gram(s) IV Push once  dorzolamide 2%/timolol 0.5% Ophthalmic Solution 1 Drop(s) Both EYES two times a day  epoetin deidre-epbx (RETACRIT) Injectable 08929 Unit(s) IV Push <User Schedule>  glucagon  Injectable 1 milliGRAM(s) IntraMuscular once  heparin   Injectable 5000 Unit(s) SubCutaneous every 8 hours  insulin glargine Injectable (LANTUS) 5 Unit(s) SubCutaneous at bedtime  insulin lispro (ADMELOG) corrective regimen sliding scale   SubCutaneous three times a day before meals  insulin lispro (ADMELOG) corrective regimen sliding scale   SubCutaneous at bedtime  lactated ringers. 1000 milliLiter(s) (75 mL/Hr) IV Continuous <Continuous>  midodrine. 10 milliGRAM(s) Oral three times a day  multivitamin 1 Tablet(s) Oral daily    MEDICATIONS  (PRN):  bisacodyl Suppository 10 milliGRAM(s) Rectal daily PRN If no bowel movement  dextrose Oral Gel 15 Gram(s) Oral once PRN Blood Glucose LESS THAN 70 milliGRAM(s)/deciliter  lactulose Syrup 10 Gram(s) Oral daily PRN Constipation  oxyCODONE    IR 5 milliGRAM(s) Oral every 4 hours PRN Moderate Pain (4 - 6)  oxyCODONE    IR 10 milliGRAM(s) Oral every 4 hours PRN Severe Pain (7 - 10)      I&O's Summary      PHYSICAL EXAM:  Vital Signs Last 24 Hrs  T(C): 36.7 (10 Feb 2023 13:25), Max: 37.2 (09 Feb 2023 21:32)  T(F): 98.1 (10 Feb 2023 13:25), Max: 98.9 (09 Feb 2023 21:32)  HR: 63 (10 Feb 2023 13:25) (58 - 67)  BP: 108/55 (10 Feb 2023 13:25) (104/52 - 140/64)  BP(mean): --  RR: 18 (10 Feb 2023 13:25) (16 - 18)  SpO2: 97% (10 Feb 2023 13:25) (97% - 100%)    Parameters below as of 10 Feb 2023 13:25  Patient On (Oxygen Delivery Method): room air      CONSTITUTIONAL: NAD, well-developed, well-groomed  RESPIRATORY: Normal respiratory effort; lungs are clear to auscultation bilaterally  CARDIOVASCULAR: Regular rate and rhythm, normal S1 and S2, no murmur/rub/gallop; No lower extremity edema  GASTROINTESTINAL: Nontender to palpation, normoactive bowel sounds, no rebound/guarding; No hepatosplenomegaly  MUSCULOSKELETAL:  no clubbing or cyanosis of digits; s/p B/L BKA , R hand wrapped (s/p amputation of third finger)  SKIN: No rashes; warm    LABS:                    COVID-19 PCR: NotDetec (06 Feb 2023 09:14)  COVID-19 PCR: NotDetec (03 Feb 2023 12:15)  COVID-19 PCR: NotDetec (01 Feb 2023 11:47)  COVID-19 PCR: NotDetec (23 Jan 2023 13:06)  COVID-19 PCR: NotDetec (18 Jan 2023 21:06)  COVID-19 PCR: NotDetec (16 Jan 2023 02:44)  SARS-CoV-2: NotDetec (12 Jan 2023 18:16)  COVID-19 PCR: NotDetec (10 Sudeep 2023 00:27)      RADIOLOGY & ADDITIONAL TESTS:  New Results Reviewed Today:   New Imaging Personally Reviewed Today:  New Electrocardiogram Personally Reviewed Today:  Prior or Outpatient Records Reviewed Today:    COMMUNICATION:  Care Discussed with Consultants/Other Providers and Details of Discussion:  Discussions with Patient/Family:  PCP Communication:

## 2023-02-10 NOTE — PROGRESS NOTE ADULT - ASSESSMENT
Patient is a 46 y/o male PMH DM2, Bilateral BKA, ESRD (on HD MWF), last dialyzed yesterday transferred from [facility] emergently for evaluation of evaluation of right finger swelling/pain. Patient reports history of right finger pain after he had a fall one year ago. Patient had a wound on her second/middle finger who he was seeing a hand surgeon for outpatient but unable ultimately to continue seeing due to insurance issues. Patient reports his middle finger developed increased swelling and became black in color about two weeks ago. Denies fevers. Patient transferred to [facility] for further evaluation and emergent OR. Patient received broad spectrum Abx of Zosyn/vanco/clindamycin at [facility]. Interpretor phone used for translation with patient. ID# [id number]        f/u  blood and urine cx,serial lactate levels,monitor vitals closley,ivfs hydration,monitor urine output and renal profile,iv abx as per id cons  ampicillin/sulbactam  IVPB 3 Gram(s) IV Intermittent every 24 hours    esrd on hd mwf   Excess fluids and waste products will be removed from your blood; your electrolytes will be balanced; your blood pressure will be controlled.    BP monitoring,continue current antihypertensive meds, low salt diet,followup with PMD in 1-2 weeks      ANEMIA PLAN:  Anemia of chronic disease:  Well controlled by epo  H and H subtherapeutic .  We will continue epo aiming for a HCT of 32-36 %.   We will monitor Iron stores, B12 and RBC folate .  epoetin deidre-epbx (RETACRIT) Injectable 93974 Unit(s) IV Push     Accuchecks monitoring and insulin sliding scale coverage, no concentrated sweets diet, serial labs and f/up with PMD, monitor HB A 1 C every 3-4 mnth  piperacillin/tazobactam IVPB.. 3.375 Gram(s) IV Intermittent every 12 hours

## 2023-02-10 NOTE — PROGRESS NOTE ADULT - PROBLEM SELECTOR PLAN 1
- Pt anticipated to RTOR for repeat I&D next week (2/16/23)  - RCRI = 2 (Class III) connoting 10.1% 30-day risk of major adverse cardiac event.   - Pt is without acute cardiac condition and tolerated previous I&Ds (1/14 + 1/16 + 1/19 + 1/25 + 2/2 + 2/7). Patient has no signs or symptoms of ACS/decompensated HF. TTE 1/13 showed EF 62%, nl LV function, mild MS. Medically optimized to proceed to OR.    - As workup is unlikely to , pt may RTOR for repeat I&D and/or VAC exchange without further cardiac testing.

## 2023-02-11 LAB
GLUCOSE BLDC GLUCOMTR-MCNC: 123 MG/DL — HIGH (ref 70–99)
GLUCOSE BLDC GLUCOMTR-MCNC: 154 MG/DL — HIGH (ref 70–99)
GLUCOSE BLDC GLUCOMTR-MCNC: 165 MG/DL — HIGH (ref 70–99)
GLUCOSE BLDC GLUCOMTR-MCNC: 260 MG/DL — HIGH (ref 70–99)
GLUCOSE BLDC GLUCOMTR-MCNC: 81 MG/DL — SIGNIFICANT CHANGE UP (ref 70–99)

## 2023-02-11 PROCEDURE — 99232 SBSQ HOSP IP/OBS MODERATE 35: CPT

## 2023-02-11 RX ORDER — HYDROMORPHONE HYDROCHLORIDE 2 MG/ML
0.5 INJECTION INTRAMUSCULAR; INTRAVENOUS; SUBCUTANEOUS ONCE
Refills: 0 | Status: DISCONTINUED | OUTPATIENT
Start: 2023-02-11 | End: 2023-02-11

## 2023-02-11 RX ADMIN — DORZOLAMIDE HYDROCHLORIDE TIMOLOL MALEATE 1 DROP(S): 20; 5 SOLUTION/ DROPS OPHTHALMIC at 07:00

## 2023-02-11 RX ADMIN — HEPARIN SODIUM 5000 UNIT(S): 5000 INJECTION INTRAVENOUS; SUBCUTANEOUS at 17:00

## 2023-02-11 RX ADMIN — MIDODRINE HYDROCHLORIDE 10 MILLIGRAM(S): 2.5 TABLET ORAL at 01:25

## 2023-02-11 RX ADMIN — HYDROMORPHONE HYDROCHLORIDE 0.5 MILLIGRAM(S): 2 INJECTION INTRAMUSCULAR; INTRAVENOUS; SUBCUTANEOUS at 04:36

## 2023-02-11 RX ADMIN — Medication 81 MILLIGRAM(S): at 12:03

## 2023-02-11 RX ADMIN — Medication 975 MILLIGRAM(S): at 01:27

## 2023-02-11 RX ADMIN — DORZOLAMIDE HYDROCHLORIDE TIMOLOL MALEATE 1 DROP(S): 20; 5 SOLUTION/ DROPS OPHTHALMIC at 17:01

## 2023-02-11 RX ADMIN — MIDODRINE HYDROCHLORIDE 10 MILLIGRAM(S): 2.5 TABLET ORAL at 16:57

## 2023-02-11 RX ADMIN — Medication 975 MILLIGRAM(S): at 16:57

## 2023-02-11 RX ADMIN — INSULIN GLARGINE 5 UNIT(S): 100 INJECTION, SOLUTION SUBCUTANEOUS at 22:14

## 2023-02-11 RX ADMIN — Medication 1: at 22:14

## 2023-02-11 RX ADMIN — Medication 975 MILLIGRAM(S): at 10:40

## 2023-02-11 RX ADMIN — Medication 975 MILLIGRAM(S): at 11:40

## 2023-02-11 RX ADMIN — INSULIN GLARGINE 5 UNIT(S): 100 INJECTION, SOLUTION SUBCUTANEOUS at 01:26

## 2023-02-11 RX ADMIN — Medication 975 MILLIGRAM(S): at 17:57

## 2023-02-11 RX ADMIN — HEPARIN SODIUM 5000 UNIT(S): 5000 INJECTION INTRAVENOUS; SUBCUTANEOUS at 10:40

## 2023-02-11 RX ADMIN — HYDROMORPHONE HYDROCHLORIDE 0.5 MILLIGRAM(S): 2 INJECTION INTRAMUSCULAR; INTRAVENOUS; SUBCUTANEOUS at 04:06

## 2023-02-11 RX ADMIN — Medication 975 MILLIGRAM(S): at 01:57

## 2023-02-11 RX ADMIN — Medication 1: at 12:02

## 2023-02-11 RX ADMIN — Medication 1: at 16:58

## 2023-02-11 RX ADMIN — AMPICILLIN SODIUM AND SULBACTAM SODIUM 200 GRAM(S): 250; 125 INJECTION, POWDER, FOR SUSPENSION INTRAMUSCULAR; INTRAVENOUS at 02:30

## 2023-02-11 RX ADMIN — HEPARIN SODIUM 5000 UNIT(S): 5000 INJECTION INTRAVENOUS; SUBCUTANEOUS at 01:25

## 2023-02-11 RX ADMIN — CHLORHEXIDINE GLUCONATE 1 APPLICATION(S): 213 SOLUTION TOPICAL at 10:40

## 2023-02-11 NOTE — PROGRESS NOTE ADULT - SUBJECTIVE AND OBJECTIVE BOX
[facility] Division of [facility] Hospital Medicine  Vivien Rodriguez M.D  Pager [phone]  Available via MS Teams    SUBJECTIVE / OVERNIGHT EVENTS: No acute events overnight. Patient with mild pain near I&D site.     ADDITIONAL REVIEW OF SYSTEMS:    MEDICATIONS  (STANDING):  acetaminophen     Tablet .. 975 milliGRAM(s) Oral every 8 hours  ampicillin/sulbactam  IVPB 3 Gram(s) IV Intermittent every 24 hours  aspirin  chewable 81 milliGRAM(s) Oral daily  chlorhexidine 2% Cloths 1 Application(s) Topical <User Schedule>  dextrose 5%. 1000 milliLiter(s) (50 mL/Hr) IV Continuous <Continuous>  dextrose 5%. 1000 milliLiter(s) (100 mL/Hr) IV Continuous <Continuous>  dextrose 50% Injectable 25 Gram(s) IV Push once  dextrose 50% Injectable 12.5 Gram(s) IV Push once  dextrose 50% Injectable 25 Gram(s) IV Push once  dorzolamide 2%/timolol 0.5% Ophthalmic Solution 1 Drop(s) Both EYES two times a day  epoetin deidre-epbx (RETACRIT) Injectable 69225 Unit(s) IV Push <User Schedule>  glucagon  Injectable 1 milliGRAM(s) IntraMuscular once  heparin   Injectable 5000 Unit(s) SubCutaneous every 8 hours  insulin glargine Injectable (LANTUS) 5 Unit(s) SubCutaneous at bedtime  insulin lispro (ADMELOG) corrective regimen sliding scale   SubCutaneous three times a day before meals  insulin lispro (ADMELOG) corrective regimen sliding scale   SubCutaneous at bedtime  lactated ringers. 1000 milliLiter(s) (75 mL/Hr) IV Continuous <Continuous>  midodrine. 10 milliGRAM(s) Oral three times a day  multivitamin 1 Tablet(s) Oral daily    MEDICATIONS  (PRN):  bisacodyl Suppository 10 milliGRAM(s) Rectal daily PRN If no bowel movement  dextrose Oral Gel 15 Gram(s) Oral once PRN Blood Glucose LESS THAN 70 milliGRAM(s)/deciliter  lactulose Syrup 10 Gram(s) Oral daily PRN Constipation      I&O's Summary    10 Feb 2023 07:01  -  11 Feb 2023 07:00  --------------------------------------------------------  IN: 700 mL / OUT: 1000 mL / NET: -300 mL        PHYSICAL EXAM:  Vital Signs Last 24 Hrs  T(C): 36.8 (11 Feb 2023 09:51), Max: 37.1 (11 Feb 2023 00:55)  T(F): 98.3 (11 Feb 2023 09:51), Max: 98.7 (11 Feb 2023 00:55)  HR: 65 (11 Feb 2023 09:51) (62 - 94)  BP: 108/55 (11 Feb 2023 09:51) (94/52 - 145/75)  BP(mean): --  RR: 18 (11 Feb 2023 09:51) (18 - 18)  SpO2: 100% (11 Feb 2023 09:51) (100% - 100%)    Parameters below as of 11 Feb 2023 09:51  Patient On (Oxygen Delivery Method): room air      CONSTITUTIONAL: NAD, well-developed, well-groomed  RESPIRATORY: Normal respiratory effort; lungs are clear to auscultation bilaterally  CARDIOVASCULAR: Regular rate and rhythm, normal S1 and S2, no murmur/rub/gallop; No lower extremity edema  GASTROINTESTINAL: Nontender to palpation, normoactive bowel sounds, no rebound/guarding; No hepatosplenomegaly  MUSCULOSKELETAL:  no clubbing or cyanosis of digits; s/p B/L BKA , R hand wrapped (s/p amputation of third finger)  SKIN: No rashes; warm    LABS:                    COVID-19 PCR: NotDetec (06 Feb 2023 09:14)  COVID-19 PCR: NotDetec (03 Feb 2023 12:15)  COVID-19 PCR: NotDetec (01 Feb 2023 11:47)  COVID-19 PCR: NotDetec (23 Jan 2023 13:06)  COVID-19 PCR: NotDetec (18 Jan 2023 21:06)  COVID-19 PCR: NotDetec (16 Jan 2023 02:44)  SARS-CoV-2: NotDetec (12 Jan 2023 18:16)  COVID-19 PCR: NotDetec (10 Sudeep 2023 00:27)      RADIOLOGY & ADDITIONAL TESTS:  New Results Reviewed Today:   New Imaging Personally Reviewed Today:  New Electrocardiogram Personally Reviewed Today:  Prior or Outpatient Records Reviewed Today:    COMMUNICATION:  Care Discussed with Consultants/Other Providers and Details of Discussion:  Discussions with Patient/Family:  PCP Communication:

## 2023-02-11 NOTE — PROGRESS NOTE ADULT - SUBJECTIVE AND OBJECTIVE BOX
ORTHO PROGRESS NOTE     Pt seen and examined at bedside, denies SOB, CP, Dizziness. N/V/D /HA.  No significant overnight events. Pain well controlled.    Vital Signs Last 24 Hrs  T(C): 36.4 (11 Feb 2023 01:43), Max: 37.1 (11 Feb 2023 00:55)  T(F): 97.6 (11 Feb 2023 01:43), Max: 98.7 (11 Feb 2023 00:55)  HR: 67 (11 Feb 2023 02:30) (63 - 94)  BP: 107/55 (11 Feb 2023 02:30) (94/52 - 145/75)  BP(mean): --  RR: 18 (11 Feb 2023 01:43) (17 - 18)  SpO2: 100% (10 Feb 2023 18:03) (97% - 100%)    Parameters below as of 11 Feb 2023 01:43  Patient On (Oxygen Delivery Method): room air        Exam:  Gen: NAD, resting comfortably  Resp: Nonlabored  R UE: vac in place with good seal, SILT  Able to spontaneously move fingers      Labs:              47y y/o Male 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 Repeat I+D on 1/14, 1/16, 1/19, 1/25, 2/7.                   PT- nonweight bearing right upper extremity          Pain control          DVT prophylaxis- ASA daily/ venodynes           Appreciate ID recs          Appreciate ophthalmology recs: outpatient follow-up          Plan for OR vac exchange 2/16          Dispo: TBD    Orthopaedic Surgery  [facility] [place] [id number]  [facility]        [id number]  [facility]  [id number]/ [phone]

## 2023-02-11 NOTE — PROGRESS NOTE ADULT - SUBJECTIVE AND OBJECTIVE BOX
Patient is a 47y Male whom presented to the hospital with esrd on hd     PAST MEDICAL & SURGICAL HISTORY:      MEDICATIONS  (STANDING):  dextrose 5%. 1000 milliLiter(s) (100 mL/Hr) IV Continuous <Continuous>  dextrose 5%. 1000 milliLiter(s) (50 mL/Hr) IV Continuous <Continuous>  dextrose 50% Injectable 25 Gram(s) IV Push once  dextrose 50% Injectable 25 Gram(s) IV Push once  dextrose 50% Injectable 12.5 Gram(s) IV Push once  glucagon  Injectable 1 milliGRAM(s) IntraMuscular once  insulin lispro (ADMELOG) corrective regimen sliding scale   SubCutaneous every 6 hours  pantoprazole    Tablet 40 milliGRAM(s) Oral daily  polyethylene glycol 3350 17 Gram(s) Oral daily  senna 2 Tablet(s) Oral at bedtime      Allergies    No Known Allergies    Intolerances        SOCIAL HISTORY:  Denies ETOh,Smoking,     FAMILY HISTORY:      REVIEW OF SYSTEMS:    CONSTITUTIONAL: No weakness, fevers or chills  NECK: No pain or stiffness  RESPIRATORY: No cough, wheezing, hemoptysis; No shortness of breath  CARDIOVASCULAR: No chest pain or palpitations  GASTROINTESTINAL: No abdominal or epigastric pain. No nausea, vomiting,                                                                               CAPILLARY BLOOD GLUCOSE      POCT Blood Glucose.: 154 mg/dL (11 Feb 2023 16:29)  POCT Blood Glucose.: 165 mg/dL (11 Feb 2023 12:00)  POCT Blood Glucose.: 81 mg/dL (11 Feb 2023 07:34)  POCT Blood Glucose.: 123 mg/dL (11 Feb 2023 01:15)  POCT Blood Glucose.: 130 mg/dL (10 Feb 2023 23:58)      Vital Signs Last 24 Hrs  T(C): 36.8 (11 Feb 2023 17:50), Max: 37.1 (11 Feb 2023 00:55)  T(F): 98.2 (11 Feb 2023 17:50), Max: 98.7 (11 Feb 2023 00:55)  HR: 66 (11 Feb 2023 17:50) (62 - 76)  BP: 112/54 (11 Feb 2023 17:50) (99/49 - 145/75)  BP(mean): --  RR: 18 (11 Feb 2023 17:50) (17 - 18)  SpO2: 100% (11 Feb 2023 17:50) (100% - 100%)    Parameters below as of 11 Feb 2023 17:50  Patient On (Oxygen Delivery Method): room air                                PHYSICAL EXAM:    Constitutional: NAD  HEENT: conjunctive   clear   Neck:  No JVD  Respiratory: CTAB  Cardiovascular: S1 and S2  Gastrointestinal: BS+, soft, NT/ND   right hand dressed. bilateral BKA   right arm AVF nontender

## 2023-02-11 NOTE — PROGRESS NOTE ADULT - NUTRITIONAL ASSESSMENT
MEDICATIONS  (STANDING):  acetaminophen     Tablet .. 975 milliGRAM(s) Oral every 8 hours  ampicillin/sulbactam  IVPB 3 Gram(s) IV Intermittent every 24 hours  aspirin  chewable 81 milliGRAM(s) Oral daily  chlorhexidine 2% Cloths 1 Application(s) Topical <User Schedule>  dorzolamide 2%/timolol 0.5% Ophthalmic Solution 1 Drop(s) Both EYES two times a day  epoetin deidre-epbx (RETACRIT) Injectable 53567 Unit(s) IV Push <User Schedule>  insulin lispro (ADMELOG) corrective regimen sliding scale   SubCutaneous at bedtime  insulin lispro (ADMELOG) corrective regimen sliding scale   SubCutaneous three times a day before meals  midodrine. 10 milliGRAM(s) Oral three times a day  multivitamin 1 Tablet(s) Oral daily

## 2023-02-11 NOTE — PROGRESS NOTE ADULT - ASSESSMENT
Patient is a 48 y/o male PMH DM2, Bilateral BKA, ESRD (on HD MWF), last dialyzed yesterday transferred from [facility] emergently for evaluation of evaluation of right finger swelling/pain. Patient reports history of right finger pain after he had a fall one year ago. Patient had a wound on her second/middle finger who he was seeing a hand surgeon for outpatient but unable ultimately to continue seeing due to insurance issues. Patient reports his middle finger developed increased swelling and became black in color about two weeks ago. Denies fevers. Patient transferred to [facility] for further evaluation and emergent OR. Patient received broad spectrum Abx of Zosyn/vanco/clindamycin at [place]. Interpretor phone used for translation with patient. ID# [id number]        f/u  blood and urine cx,serial lactate levels,monitor vitals closley,ivfs hydration,monitor urine output and renal profile,iv abx as per id cons  ampicillin/sulbactam  IVPB 3 Gram(s) IV Intermittent every 24 hours    esrd on hd mwf   Excess fluids and waste products will be removed from your blood; your electrolytes will be balanced; your blood pressure will be controlled.    BP monitoring,continue current antihypertensive meds, low salt diet,followup with PMD in 1-2 weeks      ANEMIA PLAN:  Anemia of chronic disease:  Well controlled by epo  H and H subtherapeutic .  We will continue epo aiming for a HCT of 32-36 %.   We will monitor Iron stores, B12 and RBC folate .  epoetin deidre-epbx (RETACRIT) Injectable 09650 Unit(s) IV Push     Accuchecks monitoring and insulin sliding scale coverage, no concentrated sweets diet, serial labs and f/up with PMD, monitor HB A 1 C every 3-4 mnth  piperacillin/tazobactam IVPB.. 3.375 Gram(s) IV Intermittent every 12 hours

## 2023-02-11 NOTE — PROGRESS NOTE ADULT - PROBLEM SELECTOR PLAN 4
- YpT9c=8.1%. FS well controlled. Continue with ISS for now and continue to monitor FS closely.  - sugars well controlled on currently regimen Lantus 5 units qhs   - Home regimen: Basaglar 5U qhs + Humalog 4U TID qac  - c/w lantus 5unit subq qhs in-house when not NPO

## 2023-02-11 NOTE — PROGRESS NOTE ADULT - ASSESSMENT
47M with hx of ESRD on HD, PVD s/p bilateral BKA, T2DM who initially presented to [facility] with right finger swelling and pain, found to have steal syndrome transferred to [facility] for further management. s/p revision of AVF with vascular surgery 1/13, and s/p right 3rd finger amputation and repeated I&D with ortho (1/14 + 1/16 + 1/19 +1/25 + 2/2 and 2/7) s/p SICU stay for hemorrhagic show now back on floors with plan to RTOR on Thurs 2/16.

## 2023-02-12 LAB
GLUCOSE BLDC GLUCOMTR-MCNC: 138 MG/DL — HIGH (ref 70–99)
GLUCOSE BLDC GLUCOMTR-MCNC: 150 MG/DL — HIGH (ref 70–99)
GLUCOSE BLDC GLUCOMTR-MCNC: 185 MG/DL — HIGH (ref 70–99)
GLUCOSE BLDC GLUCOMTR-MCNC: 213 MG/DL — HIGH (ref 70–99)

## 2023-02-12 PROCEDURE — 99232 SBSQ HOSP IP/OBS MODERATE 35: CPT

## 2023-02-12 RX ORDER — OXYCODONE HYDROCHLORIDE 5 MG/1
5 TABLET ORAL EVERY 6 HOURS
Refills: 0 | Status: DISCONTINUED | OUTPATIENT
Start: 2023-02-12 | End: 2023-02-19

## 2023-02-12 RX ORDER — OXYCODONE HYDROCHLORIDE 5 MG/1
10 TABLET ORAL EVERY 6 HOURS
Refills: 0 | Status: DISCONTINUED | OUTPATIENT
Start: 2023-02-12 | End: 2023-02-12

## 2023-02-12 RX ADMIN — HEPARIN SODIUM 5000 UNIT(S): 5000 INJECTION INTRAVENOUS; SUBCUTANEOUS at 10:36

## 2023-02-12 RX ADMIN — Medication 81 MILLIGRAM(S): at 10:36

## 2023-02-12 RX ADMIN — CHLORHEXIDINE GLUCONATE 1 APPLICATION(S): 213 SOLUTION TOPICAL at 10:37

## 2023-02-12 RX ADMIN — Medication 975 MILLIGRAM(S): at 01:42

## 2023-02-12 RX ADMIN — OXYCODONE HYDROCHLORIDE 5 MILLIGRAM(S): 5 TABLET ORAL at 03:05

## 2023-02-12 RX ADMIN — DORZOLAMIDE HYDROCHLORIDE TIMOLOL MALEATE 1 DROP(S): 20; 5 SOLUTION/ DROPS OPHTHALMIC at 17:02

## 2023-02-12 RX ADMIN — INSULIN GLARGINE 5 UNIT(S): 100 INJECTION, SOLUTION SUBCUTANEOUS at 22:31

## 2023-02-12 RX ADMIN — Medication 975 MILLIGRAM(S): at 17:02

## 2023-02-12 RX ADMIN — Medication 975 MILLIGRAM(S): at 11:36

## 2023-02-12 RX ADMIN — AMPICILLIN SODIUM AND SULBACTAM SODIUM 200 GRAM(S): 250; 125 INJECTION, POWDER, FOR SUSPENSION INTRAMUSCULAR; INTRAVENOUS at 01:48

## 2023-02-12 RX ADMIN — Medication 975 MILLIGRAM(S): at 10:36

## 2023-02-12 RX ADMIN — MIDODRINE HYDROCHLORIDE 10 MILLIGRAM(S): 2.5 TABLET ORAL at 06:34

## 2023-02-12 RX ADMIN — MIDODRINE HYDROCHLORIDE 10 MILLIGRAM(S): 2.5 TABLET ORAL at 22:31

## 2023-02-12 RX ADMIN — Medication 2: at 17:01

## 2023-02-12 RX ADMIN — OXYCODONE HYDROCHLORIDE 10 MILLIGRAM(S): 5 TABLET ORAL at 08:00

## 2023-02-12 RX ADMIN — OXYCODONE HYDROCHLORIDE 10 MILLIGRAM(S): 5 TABLET ORAL at 07:30

## 2023-02-12 RX ADMIN — Medication 975 MILLIGRAM(S): at 18:00

## 2023-02-12 RX ADMIN — HEPARIN SODIUM 5000 UNIT(S): 5000 INJECTION INTRAVENOUS; SUBCUTANEOUS at 17:01

## 2023-02-12 RX ADMIN — OXYCODONE HYDROCHLORIDE 5 MILLIGRAM(S): 5 TABLET ORAL at 03:35

## 2023-02-12 RX ADMIN — Medication 975 MILLIGRAM(S): at 02:16

## 2023-02-12 RX ADMIN — HEPARIN SODIUM 5000 UNIT(S): 5000 INJECTION INTRAVENOUS; SUBCUTANEOUS at 01:41

## 2023-02-12 RX ADMIN — DORZOLAMIDE HYDROCHLORIDE TIMOLOL MALEATE 1 DROP(S): 20; 5 SOLUTION/ DROPS OPHTHALMIC at 06:34

## 2023-02-12 NOTE — PROGRESS NOTE ADULT - PROBLEM SELECTOR PLAN 6
DVT ppx: per primary team  plan of care d/w pt and Ortho
reporting inability to see out of left eye, and vision grossly impaired on exam, patient is at risk fo diabetic retinopathy and vascular pathology   - seen by opthalmology: severe proliferative diabetic retinopathy, c/w Cosopt BID OS  - no further inpatient intervention, outpatient followup
reporting inability to see out of left eye, and vision grossly impaired on exam, patient is at risk fo diabetic retinopathy and vascular pathology   - seen by opthalmology: severe proliferative diabetic retinopathy, start Cosopt BID OS  - no further inpatient intervention, outpatient followup
reporting inability to see out of left eye, and vision grossly impaired on exam, patient is at risk fo diabetic retinopathy and vascular pathology   - seen by opthalmology: severe proliferative diabetic retinopathy, c/w Cosopt BID OS  - no further inpatient intervention, outpatient followup
reporting inability to see out of left eye, and vision grossly impaired on exam, patient is at risk fo diabetic retinopathy and vascular pathology   - seen by opthalmology: severe proliferative diabetic retinopathy, start Cosopt BID OS  - no further inpatient intervention, outpatient followup
DVT ppx: per primary team  plan of care d/w pt and Ortho
DVT ppx: per primary team
reporting inability to see out of left eye, and vision grossly impaired on exam, patient is at risk fo diabetic retinopathy and vascular pathology   - seen by opthalmology: severe proliferative diabetic retinopathy, c/w Cosopt BID OS  - no further inpatient intervention, outpatient followup
reporting inability to see out of left eye, and vision grossly impaired on exam, patient is at risk fo diabetic retinopathy and vascular pathology   - seen by opthalmology: severe proliferative diabetic retinopathy, start Cosopt BID OS  - no further inpatient intervention, outpatient followup
DVT ppx: per primary team  plan of care d/w pt and Ortho
DVT ppx: per primary team

## 2023-02-12 NOTE — PROGRESS NOTE ADULT - PROBLEM SELECTOR PROBLEM 7
Prophylactic measure
Need for Mobility Assisted Device
Prophylactic measure

## 2023-02-12 NOTE — PROGRESS NOTE ADULT - NUTRITIONAL ASSESSMENT
MEDICATIONS  (STANDING):  acetaminophen     Tablet .. 975 milliGRAM(s) Oral every 8 hours  ampicillin/sulbactam  IVPB 3 Gram(s) IV Intermittent every 24 hours  aspirin  chewable 81 milliGRAM(s) Oral daily  chlorhexidine 2% Cloths 1 Application(s) Topical <User Schedule>  dorzolamide 2%/timolol 0.5% Ophthalmic Solution 1 Drop(s) Both EYES two times a day  epoetin deidre-epbx (RETACRIT) Injectable 90677 Unit(s) IV Push <User Schedule>  insulin lispro (ADMELOG) corrective regimen sliding scale   SubCutaneous at bedtime  insulin lispro (ADMELOG) corrective regimen sliding scale   SubCutaneous three times a day before meals  midodrine. 10 milliGRAM(s) Oral three times a day  multivitamin 1 Tablet(s) Oral daily

## 2023-02-12 NOTE — PROGRESS NOTE ADULT - PROBLEM SELECTOR PROBLEM 6
Impaired vision
Prophylactic measure
Impaired vision
Prophylactic measure
Impaired vision
Prophylactic measure
Impaired vision
Prophylactic measure
Prophylactic measure
Impaired vision
Impaired vision

## 2023-02-12 NOTE — PROGRESS NOTE ADULT - SUBJECTIVE AND OBJECTIVE BOX
[facility] Division of Hospital Medicine  Vivien Phelanpaul BAZZI  Pager [phone]  Available via MS Teams    SUBJECTIVE / OVERNIGHT EVENTS: NO acute events overnight. Patient sleepy this AM. Denies any CP, fevers, chills or headaches.     ADDITIONAL REVIEW OF SYSTEMS:    MEDICATIONS  (STANDING):  acetaminophen     Tablet .. 975 milliGRAM(s) Oral every 8 hours  ampicillin/sulbactam  IVPB 3 Gram(s) IV Intermittent every 24 hours  aspirin  chewable 81 milliGRAM(s) Oral daily  chlorhexidine 2% Cloths 1 Application(s) Topical <User Schedule>  dextrose 5%. 1000 milliLiter(s) (100 mL/Hr) IV Continuous <Continuous>  dextrose 5%. 1000 milliLiter(s) (50 mL/Hr) IV Continuous <Continuous>  dextrose 50% Injectable 12.5 Gram(s) IV Push once  dextrose 50% Injectable 25 Gram(s) IV Push once  dextrose 50% Injectable 25 Gram(s) IV Push once  dorzolamide 2%/timolol 0.5% Ophthalmic Solution 1 Drop(s) Both EYES two times a day  epoetin deidre-epbx (RETACRIT) Injectable 78142 Unit(s) IV Push <User Schedule>  glucagon  Injectable 1 milliGRAM(s) IntraMuscular once  heparin   Injectable 5000 Unit(s) SubCutaneous every 8 hours  insulin glargine Injectable (LANTUS) 5 Unit(s) SubCutaneous at bedtime  insulin lispro (ADMELOG) corrective regimen sliding scale   SubCutaneous three times a day before meals  insulin lispro (ADMELOG) corrective regimen sliding scale   SubCutaneous at bedtime  lactated ringers. 1000 milliLiter(s) (75 mL/Hr) IV Continuous <Continuous>  midodrine. 10 milliGRAM(s) Oral three times a day  multivitamin 1 Tablet(s) Oral daily    MEDICATIONS  (PRN):  bisacodyl Suppository 10 milliGRAM(s) Rectal daily PRN If no bowel movement  dextrose Oral Gel 15 Gram(s) Oral once PRN Blood Glucose LESS THAN 70 milliGRAM(s)/deciliter  lactulose Syrup 10 Gram(s) Oral daily PRN Constipation  oxyCODONE    IR 5 milliGRAM(s) Oral every 6 hours PRN Moderate Pain (4 - 6)      I&O's Summary    11 Feb 2023 07:01  -  12 Feb 2023 07:00  --------------------------------------------------------  IN: 500 mL / OUT: 0 mL / NET: 500 mL        PHYSICAL EXAM:  Vital Signs Last 24 Hrs  T(C): 36.6 (12 Feb 2023 09:55), Max: 36.8 (11 Feb 2023 14:15)  T(F): 97.9 (12 Feb 2023 09:55), Max: 98.2 (11 Feb 2023 14:15)  HR: 60 (12 Feb 2023 09:55) (60 - 76)  BP: 141/70 (12 Feb 2023 09:55) (103/56 - 147/66)  BP(mean): --  RR: 18 (12 Feb 2023 09:55) (16 - 18)  SpO2: 100% (12 Feb 2023 09:55) (100% - 100%)    Parameters below as of 12 Feb 2023 09:55  Patient On (Oxygen Delivery Method): room air      CONSTITUTIONAL: NAD, well-developed, well-groomed  RESPIRATORY: Normal respiratory effort; lungs are clear to auscultation bilaterally  CARDIOVASCULAR: Regular rate and rhythm, normal S1 and S2, no murmur/rub/gallop; No lower extremity edema  GASTROINTESTINAL: Nontender to palpation, normoactive bowel sounds, no rebound/guarding; No hepatosplenomegaly  MUSCULOSKELETAL:  no clubbing or cyanosis of digits; s/p B/L BKA , R hand wrapped (s/p amputation of third finger)  SKIN: No rashes; warm  LABS:                    COVID-19 PCR: NotDetec (06 Feb 2023 09:14)  COVID-19 PCR: NotDetec (03 Feb 2023 12:15)  COVID-19 PCR: NotDetec (01 Feb 2023 11:47)  COVID-19 PCR: NotDetec (23 Jan 2023 13:06)  COVID-19 PCR: NotDetec (18 Jan 2023 21:06)  COVID-19 PCR: NotDetec (16 Jan 2023 02:44)  SARS-CoV-2: NotDetec (12 Jan 2023 18:16)  COVID-19 PCR: Porsche (10 Sudeep 2023 00:27)

## 2023-02-12 NOTE — PROGRESS NOTE ADULT - PROBLEM SELECTOR PLAN 7
DVT ppx: per primary team  plan of care d/w pt and Ortho

## 2023-02-12 NOTE — PROGRESS NOTE ADULT - ASSESSMENT
47M with hx of ESRD on HD, PVD s/p bilateral BKA, T2DM who initially presented to [facility] with right finger swelling and pain, found to have steal syndrome transferred to [facility] for further management. s/p revision of AVF with vascular surgery 1/13, and s/p right 3rd finger amputation and repeated I&D with ortho (1/14 + 1/16 + 1/19 +1/25 + 2/2 and 2/7) s/p SICU stay for hemorrhagic show now back on floors with plan to RTOR on Thurs 2/16.

## 2023-02-12 NOTE — PROGRESS NOTE ADULT - ASSESSMENT
Patient is a 46 y/o male PMH DM2, Bilateral BKA, ESRD (on HD MWF), last dialyzed yesterday transferred from [facility] emergently for evaluation of evaluation of right finger swelling/pain. Patient reports history of right finger pain after he had a fall one year ago. Patient had a wound on her second/middle finger who he was seeing a hand surgeon for outpatient but unable ultimately to continue seeing due to insurance issues. Patient reports his middle finger developed increased swelling and became black in color about two weeks ago. Denies fevers. Patient transferred to [facility] for further evaluation and emergent OR. Patient received broad spectrum Abx of Zosyn/vanco/clindamycin at [place]. Interpretor phone used for translation with patient. ID# [id number]        f/u  blood and urine cx,serial lactate levels,monitor vitals closley,ivfs hydration,monitor urine output and renal profile,iv abx as per id cons  ampicillin/sulbactam  IVPB 3 Gram(s) IV Intermittent every 24 hours    esrd on hd mwf   Excess fluids and waste products will be removed from your blood; your electrolytes will be balanced; your blood pressure will be controlled.    BP monitoring,continue current antihypertensive meds, low salt diet,followup with PMD in 1-2 weeks      ANEMIA PLAN:  Anemia of chronic disease:  Well controlled by epo  H and H subtherapeutic .  We will continue epo aiming for a HCT of 32-36 %.   We will monitor Iron stores, B12 and RBC folate .  epoetin deidre-epbx (RETACRIT) Injectable 16458 Unit(s) IV Push     Accuchecks monitoring and insulin sliding scale coverage, no concentrated sweets diet, serial labs and f/up with PMD, monitor HB A 1 C every 3-4 mnth  piperacillin/tazobactam IVPB.. 3.375 Gram(s) IV Intermittent every 12 hours

## 2023-02-12 NOTE — PROGRESS NOTE ADULT - PROBLEM SELECTOR PLAN 4
- YvD3f=6.1%. FS well controlled. Continue with ISS for now and continue to monitor FS closely.  - sugars well controlled on currently regimen Lantus 5 units qhs   - Home regimen: Basaglar 5U qhs + Humalog 4U TID qac  - c/w lantus 5unit subq qhs in-house when not NPO

## 2023-02-12 NOTE — PROGRESS NOTE ADULT - SUBJECTIVE AND OBJECTIVE BOX
Patient is a 47y Male whom presented to the hospital with esrd on hd     PAST MEDICAL & SURGICAL HISTORY:      MEDICATIONS  (STANDING):  dextrose 5%. 1000 milliLiter(s) (100 mL/Hr) IV Continuous <Continuous>  dextrose 5%. 1000 milliLiter(s) (50 mL/Hr) IV Continuous <Continuous>  dextrose 50% Injectable 25 Gram(s) IV Push once  dextrose 50% Injectable 25 Gram(s) IV Push once  dextrose 50% Injectable 12.5 Gram(s) IV Push once  glucagon  Injectable 1 milliGRAM(s) IntraMuscular once  insulin lispro (ADMELOG) corrective regimen sliding scale   SubCutaneous every 6 hours  pantoprazole    Tablet 40 milliGRAM(s) Oral daily  polyethylene glycol 3350 17 Gram(s) Oral daily  senna 2 Tablet(s) Oral at bedtime      Allergies    No Known Allergies    Intolerances        SOCIAL HISTORY:  Denies ETOh,Smoking,     FAMILY HISTORY:      REVIEW OF SYSTEMS:    CONSTITUTIONAL: No weakness, fevers or chills  NECK: No pain or stiffness  RESPIRATORY: No cough, wheezing, hemoptysis; No shortness of breath  CARDIOVASCULAR: No chest pain or palpitations  GASTROINTESTINAL: No abdominal or epigastric pain. No nausea, vomiting,                                                                                         CAPILLARY BLOOD GLUCOSE      POCT Blood Glucose.: 138 mg/dL (12 Feb 2023 11:29)  POCT Blood Glucose.: 150 mg/dL (12 Feb 2023 07:28)  POCT Blood Glucose.: 260 mg/dL (11 Feb 2023 21:58)  POCT Blood Glucose.: 154 mg/dL (11 Feb 2023 16:29)      Vital Signs Last 24 Hrs  T(C): 36.8 (12 Feb 2023 14:00), Max: 36.8 (11 Feb 2023 17:50)  T(F): 98.3 (12 Feb 2023 14:00), Max: 98.3 (12 Feb 2023 14:00)  HR: 61 (12 Feb 2023 14:00) (60 - 66)  BP: 137/74 (12 Feb 2023 14:00) (103/56 - 147/66)  BP(mean): --  RR: 17 (12 Feb 2023 14:00) (16 - 18)  SpO2: 100% (12 Feb 2023 14:00) (100% - 100%)    Parameters below as of 12 Feb 2023 14:00  Patient On (Oxygen Delivery Method): room air                        PHYSICAL EXAM:    Constitutional: NAD  HEENT: conjunctive   clear   Neck:  No JVD  Respiratory: CTAB  Cardiovascular: S1 and S2  Gastrointestinal: BS+, soft, NT/ND   right hand dressed. bilateral BKA   right arm AVF nontender

## 2023-02-12 NOTE — PROGRESS NOTE ADULT - SUBJECTIVE AND OBJECTIVE BOX
Pt seen/examined. Doing well. Pain controlled. No acute overnight complaints or events.    T(C): 36.6 (02-11-23 @ 21:32), Max: 37.1 (02-11-23 @ 00:55)  HR: 65 (02-11-23 @ 21:32) (62 - 76)  BP: 147/66 (02-11-23 @ 21:32) (99/49 - 147/66)  RR: 18 (02-11-23 @ 21:32) (17 - 18)  SpO2: 100% (02-11-23 @ 21:32) (100% - 100%)  Wt(kg): --  - Gen: NAD    Exam:  Gen: NAD, resting comfortably  Resp: Nonlabored  R UE: vac in place with good seal, SILT  Able to spontaneously move fingers    47y y/o Male 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 Repeat I+D on 1/14, 1/16, 1/19, 1/25, 2/7.            PT- nonweight bearing right upper extremity          Pain control          DVT prophylaxis- ASA daily/ venodynes           Appreciate ID recs          Appreciate ophthalmology recs: outpatient follow-up          Plan for OR vac exchange 2/16          Dispo: [id number]

## 2023-02-12 NOTE — PROGRESS NOTE ADULT - PROBLEM SELECTOR PLAN 3
right 3rd digit gangrene d/t steal syndrome iso of RUE AVF  - management per ortho + vascular surgery   - s/p RUE fistula repair with vascular surgery on 1/13   - s/p right 3rd finger amputation and repeated I&D with ortho (1/14 + 1/16 + 1/19 + 1/25, 2/2 and  2/7 )  - ID recs appreciated: wound cultures grew polymicrobial E coli, Strep, Bacteroides  - ID recommended unasyn for continued course given open wounds, plan to continue 6 weeks of antibiotics till 2/21 ( Unasyn while inpatient and Augmentin if discharged)   - pain control: ATC Tylenol, Dilaudid 2mg q4h PRN mild pain, 4mg q4h PRN moderate pain, 8mg q4h PRN severe pain, bowel regimen while on opiates

## 2023-02-13 LAB
ANION GAP SERPL CALC-SCNC: 23 MMOL/L — HIGH (ref 7–14)
APTT BLD: 29.7 SEC — SIGNIFICANT CHANGE UP (ref 27–36.3)
BUN SERPL-MCNC: 49 MG/DL — HIGH (ref 7–23)
CALCIUM SERPL-MCNC: 8.9 MG/DL — SIGNIFICANT CHANGE UP (ref 8.4–10.5)
CHLORIDE SERPL-SCNC: 93 MMOL/L — LOW (ref 98–107)
CO2 SERPL-SCNC: 21 MMOL/L — LOW (ref 22–31)
CREAT SERPL-MCNC: 10.41 MG/DL — HIGH (ref 0.5–1.3)
EGFR: 6 ML/MIN/1.73M2 — LOW
GLUCOSE BLDC GLUCOMTR-MCNC: 111 MG/DL — HIGH (ref 70–99)
GLUCOSE BLDC GLUCOMTR-MCNC: 141 MG/DL — HIGH (ref 70–99)
GLUCOSE BLDC GLUCOMTR-MCNC: 259 MG/DL — HIGH (ref 70–99)
GLUCOSE SERPL-MCNC: 107 MG/DL — HIGH (ref 70–99)
HCT VFR BLD CALC: 32.5 % — LOW (ref 39–50)
HGB BLD-MCNC: 10.1 G/DL — LOW (ref 13–17)
INR BLD: 1.14 RATIO — SIGNIFICANT CHANGE UP (ref 0.88–1.16)
MCHC RBC-ENTMCNC: 28 PG — SIGNIFICANT CHANGE UP (ref 27–34)
MCHC RBC-ENTMCNC: 31.1 GM/DL — LOW (ref 32–36)
MCV RBC AUTO: 90 FL — SIGNIFICANT CHANGE UP (ref 80–100)
NRBC # BLD: 0 /100 WBCS — SIGNIFICANT CHANGE UP (ref 0–0)
NRBC # FLD: 0 K/UL — SIGNIFICANT CHANGE UP (ref 0–0)
PLATELET # BLD AUTO: 453 K/UL — HIGH (ref 150–400)
POTASSIUM SERPL-MCNC: 4.4 MMOL/L — SIGNIFICANT CHANGE UP (ref 3.5–5.3)
POTASSIUM SERPL-SCNC: 4.4 MMOL/L — SIGNIFICANT CHANGE UP (ref 3.5–5.3)
PROTHROM AB SERPL-ACNC: 13.2 SEC — SIGNIFICANT CHANGE UP (ref 10.5–13.4)
RBC # BLD: 3.61 M/UL — LOW (ref 4.2–5.8)
RBC # FLD: 16.1 % — HIGH (ref 10.3–14.5)
SARS-COV-2 RNA SPEC QL NAA+PROBE: SIGNIFICANT CHANGE UP
SODIUM SERPL-SCNC: 137 MMOL/L — SIGNIFICANT CHANGE UP (ref 135–145)
WBC # BLD: 9.86 K/UL — SIGNIFICANT CHANGE UP (ref 3.8–10.5)
WBC # FLD AUTO: 9.86 K/UL — SIGNIFICANT CHANGE UP (ref 3.8–10.5)

## 2023-02-13 PROCEDURE — 99232 SBSQ HOSP IP/OBS MODERATE 35: CPT

## 2023-02-13 RX ADMIN — ERYTHROPOIETIN 10000 UNIT(S): 10000 INJECTION, SOLUTION INTRAVENOUS; SUBCUTANEOUS at 15:46

## 2023-02-13 RX ADMIN — Medication 975 MILLIGRAM(S): at 18:46

## 2023-02-13 RX ADMIN — Medication 1: at 22:42

## 2023-02-13 RX ADMIN — Medication 975 MILLIGRAM(S): at 02:13

## 2023-02-13 RX ADMIN — HEPARIN SODIUM 5000 UNIT(S): 5000 INJECTION INTRAVENOUS; SUBCUTANEOUS at 18:46

## 2023-02-13 RX ADMIN — HEPARIN SODIUM 5000 UNIT(S): 5000 INJECTION INTRAVENOUS; SUBCUTANEOUS at 01:43

## 2023-02-13 RX ADMIN — Medication 975 MILLIGRAM(S): at 01:43

## 2023-02-13 RX ADMIN — INSULIN GLARGINE 5 UNIT(S): 100 INJECTION, SOLUTION SUBCUTANEOUS at 22:42

## 2023-02-13 RX ADMIN — MIDODRINE HYDROCHLORIDE 10 MILLIGRAM(S): 2.5 TABLET ORAL at 21:16

## 2023-02-13 RX ADMIN — HEPARIN SODIUM 5000 UNIT(S): 5000 INJECTION INTRAVENOUS; SUBCUTANEOUS at 11:36

## 2023-02-13 RX ADMIN — DORZOLAMIDE HYDROCHLORIDE TIMOLOL MALEATE 1 DROP(S): 20; 5 SOLUTION/ DROPS OPHTHALMIC at 18:59

## 2023-02-13 RX ADMIN — Medication 81 MILLIGRAM(S): at 18:46

## 2023-02-13 RX ADMIN — CHLORHEXIDINE GLUCONATE 1 APPLICATION(S): 213 SOLUTION TOPICAL at 18:46

## 2023-02-13 RX ADMIN — AMPICILLIN SODIUM AND SULBACTAM SODIUM 200 GRAM(S): 250; 125 INJECTION, POWDER, FOR SUSPENSION INTRAMUSCULAR; INTRAVENOUS at 01:44

## 2023-02-13 RX ADMIN — DORZOLAMIDE HYDROCHLORIDE TIMOLOL MALEATE 1 DROP(S): 20; 5 SOLUTION/ DROPS OPHTHALMIC at 06:14

## 2023-02-13 NOTE — PROGRESS NOTE ADULT - ASSESSMENT
47y y/o Male 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 Repeat I+D on 1/14, 1/16, 1/19, 1/25, 2/7.    - PT- nonweight bearing right upper extremity  - Pain control  - DVT prophylaxis- ASA daily/ venodynes   - Appreciate ID recs  - Appreciate ophthalmology recs: outpatient follow-up  - Plan for OR vac exchange 2/16  - Dispo: [email or web address]

## 2023-02-13 NOTE — PROGRESS NOTE ADULT - SUBJECTIVE AND OBJECTIVE BOX
[facility] Medicine  Pager [phone]    Patient is a 47y old  Male who presents with a chief complaint of esrd on hd (13 Feb 2023 12:27)      INTERVAL HPI/OVERNIGHT EVENTS:    MEDICATIONS  (STANDING):  acetaminophen     Tablet .. 975 milliGRAM(s) Oral every 8 hours  ampicillin/sulbactam  IVPB 3 Gram(s) IV Intermittent every 24 hours  aspirin  chewable 81 milliGRAM(s) Oral daily  chlorhexidine 2% Cloths 1 Application(s) Topical <User Schedule>  dextrose 5%. 1000 milliLiter(s) (50 mL/Hr) IV Continuous <Continuous>  dextrose 5%. 1000 milliLiter(s) (100 mL/Hr) IV Continuous <Continuous>  dextrose 50% Injectable 25 Gram(s) IV Push once  dextrose 50% Injectable 12.5 Gram(s) IV Push once  dextrose 50% Injectable 25 Gram(s) IV Push once  dorzolamide 2%/timolol 0.5% Ophthalmic Solution 1 Drop(s) Both EYES two times a day  epoetin deidre-epbx (RETACRIT) Injectable 30455 Unit(s) IV Push <User Schedule>  glucagon  Injectable 1 milliGRAM(s) IntraMuscular once  heparin   Injectable 5000 Unit(s) SubCutaneous every 8 hours  insulin glargine Injectable (LANTUS) 5 Unit(s) SubCutaneous at bedtime  insulin lispro (ADMELOG) corrective regimen sliding scale   SubCutaneous three times a day before meals  insulin lispro (ADMELOG) corrective regimen sliding scale   SubCutaneous at bedtime  lactated ringers. 1000 milliLiter(s) (75 mL/Hr) IV Continuous <Continuous>  midodrine. 10 milliGRAM(s) Oral three times a day  multivitamin 1 Tablet(s) Oral daily    MEDICATIONS  (PRN):  bisacodyl Suppository 10 milliGRAM(s) Rectal daily PRN If no bowel movement  dextrose Oral Gel 15 Gram(s) Oral once PRN Blood Glucose LESS THAN 70 milliGRAM(s)/deciliter  lactulose Syrup 10 Gram(s) Oral daily PRN Constipation  oxyCODONE    IR 5 milliGRAM(s) Oral every 6 hours PRN Moderate Pain (4 - 6)      Allergies    No Known Drug Allergies  [place] (Rash)    Intolerances        REVIEW OF SYSTEMS:  Please see interval HPI:    Vital Signs Last 24 Hrs  T(C): 36.9 (13 Feb 2023 09:16), Max: 36.9 (13 Feb 2023 09:16)  T(F): 98.5 (13 Feb 2023 09:16), Max: 98.5 (13 Feb 2023 09:16)  HR: 66 (13 Feb 2023 09:16) (57 - 66)  BP: 128/74 (13 Feb 2023 09:16) (103/58 - 153/70)  BP(mean): --  RR: 17 (13 Feb 2023 09:16) (17 - 18)  SpO2: 97% (13 Feb 2023 09:16) (97% - 100%)    Parameters below as of 13 Feb 2023 09:16  Patient On (Oxygen Delivery Method): room air      I&O's Detail    12 Feb 2023 07:01  -  13 Feb 2023 07:00  --------------------------------------------------------  IN:    IV PiggyBack: 100 mL    Oral Fluid: 150 mL  Total IN: 250 mL    OUT:  Total OUT: 0 mL    Total NET: 250 mL            PHYSICAL EXAM:  GENERAL:   HEAD:    EYES:   ENMT:   NECK:   NERVOUS SYSTEM:    CHEST/LUNG:   HEART:   ABDOMEN:   EXTREMITIES:    LYMPH:   SKIN:     LABS:                        10.1   9.86  )-----------( 453      ( 13 Feb 2023 06:48 )             32.5     13 Feb 2023 06:48    137    |  93     |  49     ----------------------------<  107    4.4     |  21     |  10.41    Ca    8.9        13 Feb 2023 06:48      PT/INR - ( 13 Feb 2023 06:48 )   PT: 13.2 sec;   INR: 1.14 ratio         PTT - ( 13 Feb 2023 06:48 )  PTT:29.7 sec  CAPILLARY BLOOD GLUCOSE      POCT Blood Glucose.: 111 mg/dL (13 Feb 2023 07:19)  POCT Blood Glucose.: 185 mg/dL (12 Feb 2023 22:12)  POCT Blood Glucose.: 213 mg/dL (12 Feb 2023 16:43)    BLOOD CULTURE    RADIOLOGY & ADDITIONAL TESTS:    Imaging Personally Reviewed:  [ ] YES     Consultant(s) Notes Reviewed:      Care Discussed with Consultants/Other Providers: [facility] Medicine  Pager [phone]    Patient is a 47y old  Male who presents with a chief complaint of esrd on hd (13 Feb 2023 12:27)      INTERVAL HPI/OVERNIGHT EVENTS:   ID# [id number] patient reports being tired of being in the hospital ("its been 2 months"), wants to know what the plan is. Discussed return to OR on 2/16, continued abx, dispo to be determined. Patient verbalized understanding, no other concerns at this time.      MEDICATIONS  (STANDING):  acetaminophen     Tablet .. 975 milliGRAM(s) Oral every 8 hours  ampicillin/sulbactam  IVPB 3 Gram(s) IV Intermittent every 24 hours  aspirin  chewable 81 milliGRAM(s) Oral daily  chlorhexidine 2% Cloths 1 Application(s) Topical <User Schedule>  dextrose 5%. 1000 milliLiter(s) (50 mL/Hr) IV Continuous <Continuous>  dextrose 5%. 1000 milliLiter(s) (100 mL/Hr) IV Continuous <Continuous>  dextrose 50% Injectable 25 Gram(s) IV Push once  dextrose 50% Injectable 12.5 Gram(s) IV Push once  dextrose 50% Injectable 25 Gram(s) IV Push once  dorzolamide 2%/timolol 0.5% Ophthalmic Solution 1 Drop(s) Both EYES two times a day  epoetin deidre-epbx (RETACRIT) Injectable 82134 Unit(s) IV Push <User Schedule>  glucagon  Injectable 1 milliGRAM(s) IntraMuscular once  heparin   Injectable 5000 Unit(s) SubCutaneous every 8 hours  insulin glargine Injectable (LANTUS) 5 Unit(s) SubCutaneous at bedtime  insulin lispro (ADMELOG) corrective regimen sliding scale   SubCutaneous three times a day before meals  insulin lispro (ADMELOG) corrective regimen sliding scale   SubCutaneous at bedtime  lactated ringers. 1000 milliLiter(s) (75 mL/Hr) IV Continuous <Continuous>  midodrine. 10 milliGRAM(s) Oral three times a day  multivitamin 1 Tablet(s) Oral daily    MEDICATIONS  (PRN):  bisacodyl Suppository 10 milliGRAM(s) Rectal daily PRN If no bowel movement  dextrose Oral Gel 15 Gram(s) Oral once PRN Blood Glucose LESS THAN 70 milliGRAM(s)/deciliter  lactulose Syrup 10 Gram(s) Oral daily PRN Constipation  oxyCODONE    IR 5 milliGRAM(s) Oral every 6 hours PRN Moderate Pain (4 - 6)      Allergies    No Known Drug Allergies  [place] (Rash)    Intolerances        REVIEW OF SYSTEMS:  Please see interval HPI:    Vital Signs Last 24 Hrs  T(C): 36.9 (13 Feb 2023 09:16), Max: 36.9 (13 Feb 2023 09:16)  T(F): 98.5 (13 Feb 2023 09:16), Max: 98.5 (13 Feb 2023 09:16)  HR: 66 (13 Feb 2023 09:16) (57 - 66)  BP: 128/74 (13 Feb 2023 09:16) (103/58 - 153/70)  RR: 17 (13 Feb 2023 09:16) (17 - 18)  SpO2: 97% (13 Feb 2023 09:16) (97% - 100%)    Parameters below as of 13 Feb 2023 09:16  Patient On (Oxygen Delivery Method): room air      I&O's Detail    12 Feb 2023 07:01  -  13 Feb 2023 07:00  --------------------------------------------------------  IN:    IV PiggyBack: 100 mL    Oral Fluid: 150 mL  Total IN: 250 mL    OUT:  Total OUT: 0 mL    Total NET: 250 mL        PHYSICAL EXAM:  GENERAL: NAD, sitting in bed  HEAD:  NC/AT  EYES: EOMI, clear sclera/conjunctiva  ENMT: MMM, hearing intact to voice  NECK: supple, no JVD  NERVOUS SYSTEM:  moving extremities, non-focal  CHEST/LUNG: Comfortable on RA, no respiratory distress, speaking in full sentences, no wheeze appreciated  EXTREMITIES:  R hand wound vac in place, s/p finger amputation, s/p b/l BKA, patient wearing LE prosthetics, SNEHA AV    LABS:                        10.1   9.86  )-----------( 453      ( 13 Feb 2023 06:48 )             32.5     13 Feb 2023 06:48    137    |  93     |  49     ----------------------------<  107    4.4     |  21     |  10.41    Ca    8.9        13 Feb 2023 06:48      PT/INR - ( 13 Feb 2023 06:48 )   PT: 13.2 sec;   INR: 1.14 ratio    PTT - ( 13 Feb 2023 06:48 )  PTT:29.7 sec    CAPILLARY BLOOD GLUCOSE  POCT Blood Glucose.: 111 mg/dL (13 Feb 2023 07:19)  POCT Blood Glucose.: 185 mg/dL (12 Feb 2023 22:12)  POCT Blood Glucose.: 213 mg/dL (12 Feb 2023 16:43)    BLOOD CULTURE    RADIOLOGY & ADDITIONAL TESTS:    Imaging Personally Reviewed:  [ ] YES     Consultant(s) Notes Reviewed:  Renal, Ortho    Care Discussed with Consultants/Other Providers: Ortho [id number] re: pending OR 2/16 for VAC exchange

## 2023-02-13 NOTE — PROGRESS NOTE ADULT - SUBJECTIVE AND OBJECTIVE BOX
Patient is a 47y Male whom presented to the hospital with esrd on hd     PAST MEDICAL & SURGICAL HISTORY:      MEDICATIONS  (STANDING):  dextrose 5%. 1000 milliLiter(s) (100 mL/Hr) IV Continuous <Continuous>  dextrose 5%. 1000 milliLiter(s) (50 mL/Hr) IV Continuous <Continuous>  dextrose 50% Injectable 25 Gram(s) IV Push once  dextrose 50% Injectable 25 Gram(s) IV Push once  dextrose 50% Injectable 12.5 Gram(s) IV Push once  glucagon  Injectable 1 milliGRAM(s) IntraMuscular once  insulin lispro (ADMELOG) corrective regimen sliding scale   SubCutaneous every 6 hours  pantoprazole    Tablet 40 milliGRAM(s) Oral daily  polyethylene glycol 3350 17 Gram(s) Oral daily  senna 2 Tablet(s) Oral at bedtime      Allergies    No Known Allergies    Intolerances        SOCIAL HISTORY:  Denies ETOh,Smoking,     FAMILY HISTORY:      REVIEW OF SYSTEMS:    CONSTITUTIONAL: No weakness, fevers or chills  NECK: No pain or stiffness  RESPIRATORY: No cough, wheezing, hemoptysis; No shortness of breath  CARDIOVASCULAR: No chest pain or palpitations  GASTROINTESTINAL: No abdominal or epigastric pain. No nausea, vomiting,                                                                                                       10.1   9.86  )-----------( 453      ( 13 Feb 2023 06:48 )             32.5       CBC Full  -  ( 13 Feb 2023 06:48 )  WBC Count : 9.86 K/uL  RBC Count : 3.61 M/uL  Hemoglobin : 10.1 g/dL  Hematocrit : 32.5 %  Platelet Count - Automated : 453 K/uL  Mean Cell Volume : 90.0 fL  Mean Cell Hemoglobin : 28.0 pg  Mean Cell Hemoglobin Concentration : 31.1 gm/dL  Auto Neutrophil # : x  Auto Lymphocyte # : x  Auto Monocyte # : x  Auto Eosinophil # : x  Auto Basophil # : x  Auto Neutrophil % : x  Auto Lymphocyte % : x  Auto Monocyte % : x  Auto Eosinophil % : x  Auto Basophil % : x      02-13    137  |  93<L>  |  49<H>  ----------------------------<  107<H>  4.4   |  21<L>  |  10.41<H>    Ca    8.9      13 Feb 2023 06:48        CAPILLARY BLOOD GLUCOSE      POCT Blood Glucose.: 111 mg/dL (13 Feb 2023 07:19)  POCT Blood Glucose.: 185 mg/dL (12 Feb 2023 22:12)  POCT Blood Glucose.: 213 mg/dL (12 Feb 2023 16:43)      Vital Signs Last 24 Hrs  T(C): 36.9 (13 Feb 2023 09:16), Max: 36.9 (13 Feb 2023 09:16)  T(F): 98.5 (13 Feb 2023 09:16), Max: 98.5 (13 Feb 2023 09:16)  HR: 66 (13 Feb 2023 09:16) (57 - 66)  BP: 128/74 (13 Feb 2023 09:16) (103/58 - 153/70)  BP(mean): --  RR: 17 (13 Feb 2023 09:16) (17 - 18)  SpO2: 97% (13 Feb 2023 09:16) (97% - 100%)    Parameters below as of 13 Feb 2023 09:16  Patient On (Oxygen Delivery Method): room air            PT/INR - ( 13 Feb 2023 06:48 )   PT: 13.2 sec;   INR: 1.14 ratio         PTT - ( 13 Feb 2023 06:48 )  PTT:29.7 sec                  PHYSICAL EXAM:    Constitutional: NAD  HEENT: conjunctive   clear   Neck:  No JVD  Respiratory: CTAB  Cardiovascular: S1 and S2  Gastrointestinal: BS+, soft, NT/ND   right hand dressed. bilateral BKA   right arm AVF nontender

## 2023-02-13 NOTE — PROGRESS NOTE ADULT - NUTRITIONAL ASSESSMENT
MEDICATIONS  (STANDING):  acetaminophen     Tablet .. 975 milliGRAM(s) Oral every 8 hours  ampicillin/sulbactam  IVPB 3 Gram(s) IV Intermittent every 24 hours  aspirin  chewable 81 milliGRAM(s) Oral daily  chlorhexidine 2% Cloths 1 Application(s) Topical <User Schedule>  dorzolamide 2%/timolol 0.5% Ophthalmic Solution 1 Drop(s) Both EYES two times a day  epoetin deidre-epbx (RETACRIT) Injectable 32205 Unit(s) IV Push <User Schedule>  insulin lispro (ADMELOG) corrective regimen sliding scale   SubCutaneous at bedtime  insulin lispro (ADMELOG) corrective regimen sliding scale   SubCutaneous three times a day before meals  midodrine. 10 milliGRAM(s) Oral three times a day  multivitamin 1 Tablet(s) Oral daily

## 2023-02-13 NOTE — PROGRESS NOTE ADULT - SUBJECTIVE AND OBJECTIVE BOX
Orthopedic Progress Note     S:  No acute events overnight, pain is well controlled.  Patient denies any chest pain, SOB, N/V, fevers/chills.    T(C): 36.6 (02-13-23 @ 06:12), Max: 36.8 (02-12-23 @ 14:00)  HR: 64 (02-13-23 @ 06:12) (57 - 64)  BP: 153/70 (02-13-23 @ 06:12) (103/58 - 153/70)  RR: 18 (02-13-23 @ 06:12) (16 - 18)  SpO2: 100% (02-13-23 @ 06:12) (100% - 100%)  Wt(kg): --I&O's Summary    11 Feb 2023 07:01  -  12 Feb 2023 07:00  --------------------------------------------------------  IN: 500 mL / OUT: 0 mL / NET: 500 mL    12 Feb 2023 07:01  -  13 Feb 2023 06:55  --------------------------------------------------------  IN: 250 mL / OUT: 0 mL / NET: 250 mL        O:  Physical exam:  Gen: NAD, resting comfortably  Resp: Nonlabored  R UE: vac in place with good seal, SILT  Able to spontaneously move fingers

## 2023-02-13 NOTE — PROGRESS NOTE ADULT - ASSESSMENT
46 yo M w/ ESRD on HD, PVD s/p b/l BKA, DM2 c/b diabetic retinopathy, initially presenting to [facility] w/ R finger swelling and pain, found to have steal syndrome, transferred to [facility] for further management, s/p revision of AVF w/ vascular surgery on 1/13, s/p R 3rd finger amputation and repeated I&D with ortho (1/14, 1/16, 1/19, 1/25, 2/2, 2/7), planned for return to OR on 2/16 for VAC exchange.     # Steal syndrome as complication of dialysis access, c/b R 3rd digit gangrene  - s/p RUE fistula repair on 1/13  - s/p R 3rd finger amputation and repeated I&D w/ ortho  - c/w pain management as per ortho  - c/w bowel regimen to prevent opioid induced constipation  - ID recs appreciated, wound cx polymicrobial (E. coli, strep, bacteroides)  - c/w IV unasyn as per ID, plan for 6 weeks of abx til 2/21, can convert to Augmentin if discharged, c/w IV Unasyn while inpatient  - plan to return to OR on 2/16 for VAC exchange, ?possible repeat I&D  - pre-op assessment: RCRI = 2 (ESRD, diabetes on insulin therapy), no signs/symptoms of ACS or decompensated heart failure, can RTOR without need for further cardiac testing, is optimized for procedure    #  Hypotension  - improved, on midodrine 10 tid    # DM2 on long term insulin therapy, c/b diabetic retinopathy  - c/w Lantus and sliding scale coverage  - A1C 7.1 in 1/10  - consider checking fasting lipid profile, given diabetes and hx of PVD, patient may benefit from statin therapy  - monitor FS (goal <180), currently in acceptable range, adjust regimen as needed  - ophthalmology recs appreciated, severe proliferative diabetic retinopathy, c/w cosopt, otpt ophthalmology followup    # ESRD on HD  - c/w HD as per renal  - anemia of renal disease, c/w epo during HD as per renal  - renal restricted diet w/ nepro supplements    VTE ppx: HSQ    Dispo: pending ortho procedure, further reassessment

## 2023-02-13 NOTE — PROGRESS NOTE ADULT - ASSESSMENT
Patient is a 48 y/o male PMH DM2, Bilateral BKA, ESRD (on HD MWF), last dialyzed yesterday transferred from [facility] emergently for evaluation of evaluation of right finger swelling/pain. Patient reports history of right finger pain after he had a fall one year ago. Patient had a wound on her second/middle finger who he was seeing a hand surgeon for outpatient but unable ultimately to continue seeing due to insurance issues. Patient reports his middle finger developed increased swelling and became black in color about two weeks ago. Denies fevers. Patient transferred to [facility] for further evaluation and emergent OR. Patient received broad spectrum Abx of Zosyn/vanco/clindamycin at [place]. Interpretor phone used for translation with patient. ID# [id number]        f/u  blood and urine cx,serial lactate levels,monitor vitals closley,ivfs hydration,monitor urine output and renal profile,iv abx as per id cons  ampicillin/sulbactam  IVPB 3 Gram(s) IV Intermittent every 24 hours    esrd on hd mwf   Excess fluids and waste products will be removed from your blood; your electrolytes will be balanced; your blood pressure will be controlled.    BP monitoring,continue current antihypertensive meds, low salt diet,followup with PMD in 1-2 weeks      ANEMIA PLAN:  Anemia of chronic disease:  Well controlled by epo  H and H subtherapeutic .  We will continue epo aiming for a HCT of 32-36 %.   We will monitor Iron stores, B12 and RBC folate .  epoetin deidre-epbx (RETACRIT) Injectable 46876 Unit(s) IV Push     Accuchecks monitoring and insulin sliding scale coverage, no concentrated sweets diet, serial labs and f/up with PMD, monitor HB A 1 C every 3-4 mnth  piperacillin/tazobactam IVPB.. 3.375 Gram(s) IV Intermittent every 12 hours

## 2023-02-14 LAB
GLUCOSE BLDC GLUCOMTR-MCNC: 173 MG/DL — HIGH (ref 70–99)
GLUCOSE BLDC GLUCOMTR-MCNC: 175 MG/DL — HIGH (ref 70–99)
GLUCOSE BLDC GLUCOMTR-MCNC: 177 MG/DL — HIGH (ref 70–99)

## 2023-02-14 PROCEDURE — 99232 SBSQ HOSP IP/OBS MODERATE 35: CPT

## 2023-02-14 RX ADMIN — OXYCODONE HYDROCHLORIDE 5 MILLIGRAM(S): 5 TABLET ORAL at 14:40

## 2023-02-14 RX ADMIN — DORZOLAMIDE HYDROCHLORIDE TIMOLOL MALEATE 1 DROP(S): 20; 5 SOLUTION/ DROPS OPHTHALMIC at 17:17

## 2023-02-14 RX ADMIN — OXYCODONE HYDROCHLORIDE 5 MILLIGRAM(S): 5 TABLET ORAL at 13:44

## 2023-02-14 RX ADMIN — HEPARIN SODIUM 5000 UNIT(S): 5000 INJECTION INTRAVENOUS; SUBCUTANEOUS at 09:26

## 2023-02-14 RX ADMIN — Medication 1: at 16:48

## 2023-02-14 RX ADMIN — Medication 81 MILLIGRAM(S): at 13:25

## 2023-02-14 RX ADMIN — CHLORHEXIDINE GLUCONATE 1 APPLICATION(S): 213 SOLUTION TOPICAL at 13:25

## 2023-02-14 RX ADMIN — Medication 1: at 11:37

## 2023-02-14 RX ADMIN — MIDODRINE HYDROCHLORIDE 10 MILLIGRAM(S): 2.5 TABLET ORAL at 22:58

## 2023-02-14 RX ADMIN — MIDODRINE HYDROCHLORIDE 10 MILLIGRAM(S): 2.5 TABLET ORAL at 13:25

## 2023-02-14 RX ADMIN — Medication 975 MILLIGRAM(S): at 17:18

## 2023-02-14 RX ADMIN — HEPARIN SODIUM 5000 UNIT(S): 5000 INJECTION INTRAVENOUS; SUBCUTANEOUS at 17:18

## 2023-02-14 RX ADMIN — INSULIN GLARGINE 5 UNIT(S): 100 INJECTION, SOLUTION SUBCUTANEOUS at 22:54

## 2023-02-14 RX ADMIN — AMPICILLIN SODIUM AND SULBACTAM SODIUM 200 GRAM(S): 250; 125 INJECTION, POWDER, FOR SUSPENSION INTRAMUSCULAR; INTRAVENOUS at 01:39

## 2023-02-14 RX ADMIN — HEPARIN SODIUM 5000 UNIT(S): 5000 INJECTION INTRAVENOUS; SUBCUTANEOUS at 01:36

## 2023-02-14 RX ADMIN — Medication 1: at 07:31

## 2023-02-14 RX ADMIN — DORZOLAMIDE HYDROCHLORIDE TIMOLOL MALEATE 1 DROP(S): 20; 5 SOLUTION/ DROPS OPHTHALMIC at 06:26

## 2023-02-14 NOTE — PROGRESS NOTE ADULT - NUTRITIONAL ASSESSMENT
MEDICATIONS  (STANDING):  acetaminophen     Tablet .. 975 milliGRAM(s) Oral every 8 hours  ampicillin/sulbactam  IVPB 3 Gram(s) IV Intermittent every 24 hours  aspirin  chewable 81 milliGRAM(s) Oral daily  chlorhexidine 2% Cloths 1 Application(s) Topical <User Schedule>  dorzolamide 2%/timolol 0.5% Ophthalmic Solution 1 Drop(s) Both EYES two times a day  epoetin deidre-epbx (RETACRIT) Injectable 16761 Unit(s) IV Push <User Schedule>  insulin lispro (ADMELOG) corrective regimen sliding scale   SubCutaneous at bedtime  insulin lispro (ADMELOG) corrective regimen sliding scale   SubCutaneous three times a day before meals  midodrine. 10 milliGRAM(s) Oral three times a day  multivitamin 1 Tablet(s) Oral daily

## 2023-02-14 NOTE — PROGRESS NOTE ADULT - SUBJECTIVE AND OBJECTIVE BOX
ORTHO PROGRESS NOTE     Pt seen and examined at bedside, denies SOB, CP, Dizziness. N/V/D /HA.  No significant overnight events. Pain well controlled.    Vital Signs Last 24 Hrs  T(C): 36.9 (14 Feb 2023 01:38), Max: 37.2 (13 Feb 2023 13:32)  T(F): 98.5 (14 Feb 2023 01:38), Max: 98.9 (13 Feb 2023 13:32)  HR: 78 (14 Feb 2023 01:38) (61 - 78)  BP: 147/63 (14 Feb 2023 01:38) (106/45 - 147/63)  BP(mean): --  RR: 17 (14 Feb 2023 01:38) (16 - 17)  SpO2: 98% (14 Feb 2023 01:38) (95% - 100%)    Parameters below as of 14 Feb 2023 01:38  Patient On (Oxygen Delivery Method): room air        O:  Physical exam:  Gen: NAD, resting comfortably  Resp: Nonlabored  R UE: vac in place with good seal, SILT  Able to spontaneously move fingers      Labs:  CBC Full  -  ( 13 Feb 2023 06:48 )  WBC Count : 9.86 K/uL  RBC Count : 3.61 M/uL  Hemoglobin : 10.1 g/dL  Hematocrit : 32.5 %  Platelet Count - Automated : 453 K/uL  Mean Cell Volume : 90.0 fL  Mean Cell Hemoglobin : 28.0 pg  Mean Cell Hemoglobin Concentration : 31.1 gm/dL  Auto Neutrophil # : x  Auto Lymphocyte # : x  Auto Monocyte # : x  Auto Eosinophil # : x  Auto Basophil # : x  Auto Neutrophil % : x  Auto Lymphocyte % : x  Auto Monocyte % : x  Auto Eosinophil % : x  Auto Basophil % : x      02-13    137  |  93<L>  |  49<H>  ----------------------------<  107<H>  4.4   |  21<L>  |  10.41<H>    Ca    8.9      13 Feb 2023 06:48        47y y/o Male 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 Repeat I+D on 1/14, 1/16, 1/19, 1/25, 2/7.    - PT- nonweight bearing right upper extremity  - Pain control  - DVT prophylaxis- ASA daily/ venodynes   - Appreciate ID recs  - Appreciate ophthalmology recs: outpatient follow-up  - Plan for OR vac exchange 2/16  - Dispo: [facility]

## 2023-02-14 NOTE — PROGRESS NOTE ADULT - ASSESSMENT
Patient is a 48 y/o male PMH DM2, Bilateral BKA, ESRD (on HD MWF), last dialyzed yesterday transferred from [facility] emergently for evaluation of evaluation of right finger swelling/pain. Patient reports history of right finger pain after he had a fall one year ago. Patient had a wound on her second/middle finger who he was seeing a hand surgeon for outpatient but unable ultimately to continue seeing due to insurance issues. Patient reports his middle finger developed increased swelling and became black in color about two weeks ago. Denies fevers. Patient transferred to [facility] for further evaluation and emergent OR. Patient received broad spectrum Abx of Zosyn/vanco/clindamycin at [place]. Interpretor phone used for translation with patient. ID# [id number]        f/u  blood and urine cx,serial lactate levels,monitor vitals closley,ivfs hydration,monitor urine output and renal profile,iv abx as per id cons  ampicillin/sulbactam  IVPB 3 Gram(s) IV Intermittent every 24 hours    esrd on hd mwf   Excess fluids and waste products will be removed from your blood; your electrolytes will be balanced; your blood pressure will be controlled.    BP monitoring,continue current antihypertensive meds, low salt diet,followup with PMD in 1-2 weeks      ANEMIA PLAN:  Anemia of chronic disease:  Well controlled by epo  H and H subtherapeutic .  We will continue epo aiming for a HCT of 32-36 %.   We will monitor Iron stores, B12 and RBC folate .  epoetin deidre-epbx (RETACRIT) Injectable 63006 Unit(s) IV Push     Accuchecks monitoring and insulin sliding scale coverage, no concentrated sweets diet, serial labs and f/up with PMD, monitor HB A 1 C every 3-4 mnth  piperacillin/tazobactam IVPB.. 3.375 Gram(s) IV Intermittent every 12 hours

## 2023-02-14 NOTE — PROGRESS NOTE ADULT - ASSESSMENT
History  Chief Complaint   Patient presents with    Sore Throat     pt c/o sore throat for last 2 days with a stuffy head     Patient is a 79-year-old female with past medical history of asthma, chronic back pain and opioid dependence, GERD, hypertension, hyperlipidemia, presents to the emergency department complaining of 2 days of sore throat and head congestion  Patient states she has been unable to eat anything due to throat pain upon swallowing  She also reports sinus pressure and head congestion  She took her usual pain medications which include oxycodone and Vicodin which did give her some relief of the sore throat  She has not tried any NSAIDs or over-the-counter cold remedies  She states the first day of her symptoms she had a subjective fever but denies fever currently  She also states she initially had bilateral ear pain however that has resolved as well  She denies any chills, headache, dizziness or near syncope, neck pain or stiffness, runny nose, cough, shortness of breath, chest pain, abdominal pain, nausea, vomiting, diarrhea, constipation, urinary symptoms, skin rash or color change, extremity weakness or paresthesia or other focal neurologic deficits  She denies any recent sick contacts or recent travel  History provided by:  Patient   used: No        Prior to Admission Medications   Prescriptions Last Dose Informant Patient Reported? Taking? HYDROcodone-acetaminophen (VICODIN) 7 5-500 MG per tablet   Yes No   Sig: Take 1 tablet by mouth every 6 (six) hours as needed for moderate pain   OxyCODONE HCl ER (OxyCONTIN) 30 MG T12A   Yes No   Sig: Take 30 mg by mouth 3 (three) times a day as needed for moderate pain   albuterol (PROVENTIL HFA,VENTOLIN HFA) 90 mcg/act inhaler   Yes No   Sig: Inhale 2 puffs 2 (two) times a day as needed     albuterol 2 mg/5 mL syrup   Yes No   Sig: Take 2 mg by mouth 4 (four) times a day 1 teaspoon po every 4 hours as needed  aspirin 81 mg chewable tablet   No No   Sig: Chew 1 tablet daily   baclofen 10 mg tablet   Yes No   Sig: Take 10 mg by mouth 3 (three) times a day   ergocalciferol (VITAMIN D2) 50,000 units   Yes No   Sig: Take 50,000 Units by mouth once a week   fenofibrate (TRIGLIDE) 160 MG tablet   Yes No   Sig: Take 160 mg by mouth daily Take with a meal   furosemide (LASIX) 40 mg tablet   Yes No   Sig: Take 40 mg by mouth daily   gabapentin (NEURONTIN) 800 mg tablet   Yes No   Sig: Take 800 mg by mouth 3 (three) times a day   omega-3-acid ethyl esters (LOVAZA) 1 g capsule   Yes No   Sig: Take 1 g by mouth 2 (two) times a day   omeprazole (PriLOSEC) 20 mg delayed release capsule   Yes No   Sig: Take 20 mg by mouth 2 (two) times a day     ondansetron (ZOFRAN) 4 mg tablet   Yes No   Sig: Take 4 mg by mouth   pantoprazole (PROTONIX) 40 mg tablet   Yes No   Sig: Take 40 mg by mouth daily   pentosan polysulfate (ELMIRON) 100 mg capsule   Yes No   Sig: Take 100 mg by mouth 3 (three) times a day before meals 2 capsules po  3 time a day   pravastatin (PRAVACHOL) 20 mg tablet   Yes No   Sig: Take 20 mg by mouth daily   prochlorperazine (COMPAZINE) 10 mg tablet   Yes No   Sig: Take 10 mg by mouth 2 (two) times a day     venlafaxine (EFFEXOR) 75 mg tablet   Yes No   Sig: Take 37 5 mg by mouth 2 (two) times a day     venlafaxine (EFFEXOR) 75 mg tablet   Yes No   Sig: Take 75 mg by mouth 2 (two) times a day   zolpidem (AMBIEN) 5 mg tablet   Yes No   Sig: Take 5 mg by mouth daily at bedtime as needed for sleep      Facility-Administered Medications: None       Past Medical History:   Diagnosis Date    Asthma     Cataract     Chronic back pain     Chronic pain disorder     Back    GERD (gastroesophageal reflux disease)     HTN (hypertension)     Hyperlipidemia     Interstitial cystitis     Liver disease     Muscle cramps     Obesity     Pneumonia     Radiculopathy, lumbar region     Spondylosis, cervical, with myelopathy     Vulvovaginitis        Past Surgical History:   Procedure Laterality Date    APPENDECTOMY      BACK SURGERY      Arthrodeiss lumbar    BACK SURGERY      Spinal stereotaxis of cord    BLADDER SURGERY      Electronic bladder stimulator implantation     SECTION       SECTION      CHOLECYSTECTOMY      COLON SURGERY      Intestinal stricturoplasty     FINGER SURGERY      Extensor tendon repair (mallet finger)    HERNIA REPAIR      HYSTERECTOMY      SC ESOPHAGOGASTRODUODENOSCOPY TRANSORAL DIAGNOSTIC N/A 2017    Procedure: ESOPHAGOGASTRODUODENOSCOPY (EGD); Surgeon: Wanda Fagan MD;  Location: [facility]; Service: Gastroenterology    SALPINGOOPHORECTOMY         Family History   Problem Relation Age of Onset    Coronary artery disease Brother      Patient has 3 brothers with coronary artery disease     I have reviewed and agree with the history as documented  Social History   Substance Use Topics    Smoking status: Never Smoker    Smokeless tobacco: Not on file    Alcohol use No        Review of Systems   Constitutional: Negative for appetite change, chills, fatigue and fever  HENT: Positive for congestion, postnasal drip, sinus pressure, sore throat and trouble swallowing  Negative for ear discharge, ear pain, mouth sores, nosebleeds, rhinorrhea and voice change  Eyes: Negative for pain and visual disturbance  Respiratory: Negative for cough, chest tightness, shortness of breath and wheezing  Cardiovascular: Negative for chest pain and palpitations  Gastrointestinal: Negative for abdominal pain, constipation, diarrhea, nausea and vomiting  Genitourinary: Negative for dysuria, flank pain, frequency and hematuria  Musculoskeletal: Negative for back pain, neck pain and neck stiffness  Skin: Negative for color change, pallor and rash  Allergic/Immunologic: Negative for immunocompromised state     Neurological: Negative for dizziness, syncope, weakness, 46 yo M w/ ESRD on HD, PVD s/p b/l BKA, DM2 c/b diabetic retinopathy, initially presenting to [facility] w/ R finger swelling and pain, found to have steal syndrome, transferred to [facility] for further management, s/p revision of AVF w/ vascular surgery on 1/13, s/p R 3rd finger amputation and repeated I&D with ortho (1/14, 1/16, 1/19, 1/25, 2/2, 2/7), planned for return to OR on 2/16 for VAC exchange.     # Steal syndrome as complication of dialysis access, c/b R 3rd digit gangrene  - s/p RUE fistula repair on 1/13  - s/p R 3rd finger amputation and repeated I&D w/ ortho  - c/w pain management as per ortho  - c/w bowel regimen to prevent opioid induced constipation  - ID recs appreciated, wound cx polymicrobial (E. coli, strep, bacteroides)  - c/w IV unasyn as per ID, plan for 6 weeks of abx til 2/21, can convert to Augmentin if discharged, c/w IV Unasyn while inpatient  - plan to return to OR on 2/16 for VAC exchange, ?possible repeat I&D  - pre-op assessment: RCRI = 2 (ESRD, diabetes on insulin therapy), no signs/symptoms of ACS or decompensated heart failure, can RTOR without need for further cardiac testing, is optimized for procedure    #  Hypotension  - improved, on midodrine 10 tid    # DM2 on long term insulin therapy, c/b diabetic retinopathy  - c/w Lantus and sliding scale coverage  - A1C 7.1 in 1/10  - consider checking fasting lipid profile, given diabetes and hx of PVD, patient may benefit from statin therapy  - monitor FS (goal <180), overall in acceptable range, adjust regimen as needed  - ophthalmology recs appreciated, severe proliferative diabetic retinopathy, c/w cosopt, otpt ophthalmology followup    # ESRD on HD  - c/w HD as per renal  - anemia of renal disease, c/w epo during HD as per renal  - renal restricted diet w/ nepro supplements    VTE ppx: HSQ    Dispo: pending ortho procedure, further reassessment     light-headedness, numbness and headaches  Hematological: Negative for adenopathy  Psychiatric/Behavioral: Negative for confusion and decreased concentration  All other systems reviewed and are negative  Physical Exam  ED Triage Vitals [09/29/17 0941]   Temperature Pulse Respirations Blood Pressure SpO2   98 °F (36 7 °C) 103 18 130/83 95 %      Temp src Heart Rate Source Patient Position - Orthostatic VS BP Location FiO2 (%)   -- -- -- -- --      Pain Score       8           Physical Exam   Constitutional: She is oriented to person, place, and time  She appears well-developed and well-nourished  No distress  HENT:   Head: Normocephalic and atraumatic  Right Ear: External ear normal    Left Ear: External ear normal    Nose: Nose normal    Mouth/Throat: No oropharyngeal exudate  Bilateral tonsillar erythema and hypertrophy  No pharyngeal exudate  Eyes: Conjunctivae and EOM are normal  Pupils are equal, round, and reactive to light  Neck: Normal range of motion  Neck supple  No JVD present  Bilateral submandibular lymphadenopathy  Cardiovascular: Normal rate, regular rhythm, normal heart sounds and intact distal pulses  Exam reveals no gallop and no friction rub  No murmur heard  Pulmonary/Chest: Effort normal and breath sounds normal  No respiratory distress  She has no wheezes  She has no rales  Abdominal: Soft  Bowel sounds are normal  She exhibits no distension  There is no tenderness  There is no rebound and no guarding  Musculoskeletal: Normal range of motion  She exhibits no edema or tenderness  Lymphadenopathy:     She has no cervical adenopathy  Neurological: She is alert and oriented to person, place, and time  No gross motor or sensory deficits  Skin: Skin is warm and dry  No rash noted  She is not diaphoretic  No erythema  No pallor  Psychiatric: She has a normal mood and affect  Her behavior is normal    Nursing note and vitals reviewed        ED Medications  Medications   dexamethasone (DECADRON) injection 10 mg (10 mg Oral Given 9/29/17 0940)   acetaminophen (TYLENOL) tablet 650 mg (650 mg Oral Given 9/29/17 1001)       Diagnostic Studies  Labs Reviewed - No data to display    No orders to display       Procedures  Procedures      Phone Contacts  ED Phone Contact    ED Course  ED Course                                MDM  Number of Diagnoses or Management Options  Diagnosis management comments: Acute pharyngitis and congestion, likely viral in etiology  Low clinical suspicion for acute streptococcal pharyngitis  There are no tonsillar exudate, patient is afebrile and given age, strep is unlikely  She has not been exposed to anyone with strep pharyngitis  Will treat symptomatically with 1 dose of Decadron for anti-inflammatory effects  Offered patient ibuprofen however she prefers Tylenol  Recommended PCP follow-up  Discussed ED return parameters  CritCare Time    Disposition  Final diagnoses:   Viral URI   Sore throat   Head congestion     ED Disposition     ED Disposition Condition Comment    Discharge  Zeyad discharge to home/self care  Condition at discharge: Stable        Follow-up Information     Follow up With Specialties Details Why Contact Info Additional Information    Geni Jeronimo MD Internal Medicine Schedule an appointment as soon as possible for a visit  [facility] [address]       [address] [facility] Emergency Department Emergency Medicine Go to If symptoms worsen [address] [facility] [address] [place] [address], [address] [place], [place], [facility]        Patient's Medications   Discharge Prescriptions    FLUTICASONE (FLONASE) 50 MCG/ACT NASAL SPRAY    2 sprays into each nostril daily       Start Date: 9/29/2017 End Date: --       Order Dose: 2 sprays       Quantity: 16 g    Refills: 0     No discharge procedures on file      ED Provider  Electronically Signed by       Nasreen Blue DO  09/29/17 1017

## 2023-02-14 NOTE — PROGRESS NOTE ADULT - SUBJECTIVE AND OBJECTIVE BOX
Patient is a 47y Male whom presented to the hospital with esrd on hd     PAST MEDICAL & SURGICAL HISTORY:      MEDICATIONS  (STANDING):  dextrose 5%. 1000 milliLiter(s) (100 mL/Hr) IV Continuous <Continuous>  dextrose 5%. 1000 milliLiter(s) (50 mL/Hr) IV Continuous <Continuous>  dextrose 50% Injectable 25 Gram(s) IV Push once  dextrose 50% Injectable 25 Gram(s) IV Push once  dextrose 50% Injectable 12.5 Gram(s) IV Push once  glucagon  Injectable 1 milliGRAM(s) IntraMuscular once  insulin lispro (ADMELOG) corrective regimen sliding scale   SubCutaneous every 6 hours  pantoprazole    Tablet 40 milliGRAM(s) Oral daily  polyethylene glycol 3350 17 Gram(s) Oral daily  senna 2 Tablet(s) Oral at bedtime      Allergies    No Known Allergies    Intolerances        SOCIAL HISTORY:  Denies ETOh,Smoking,     FAMILY HISTORY:      REVIEW OF SYSTEMS:    CONSTITUTIONAL: No weakness, fevers or chills  NECK: No pain or stiffness  RESPIRATORY: No cough, wheezing, hemoptysis; No shortness of breath  CARDIOVASCULAR: No chest pain or palpitations  GASTROINTESTINAL: No abdominal or epigastric pain. No nausea, vomiting,                                                                                           10.1   9.86  )-----------( 453      ( 13 Feb 2023 06:48 )             32.5       CBC Full  -  ( 13 Feb 2023 06:48 )  WBC Count : 9.86 K/uL  RBC Count : 3.61 M/uL  Hemoglobin : 10.1 g/dL  Hematocrit : 32.5 %  Platelet Count - Automated : 453 K/uL  Mean Cell Volume : 90.0 fL  Mean Cell Hemoglobin : 28.0 pg  Mean Cell Hemoglobin Concentration : 31.1 gm/dL  Auto Neutrophil # : x  Auto Lymphocyte # : x  Auto Monocyte # : x  Auto Eosinophil # : x  Auto Basophil # : x  Auto Neutrophil % : x  Auto Lymphocyte % : x  Auto Monocyte % : x  Auto Eosinophil % : x  Auto Basophil % : x      02-13    137  |  93<L>  |  49<H>  ----------------------------<  107<H>  4.4   |  21<L>  |  10.41<H>    Ca    8.9      13 Feb 2023 06:48        CAPILLARY BLOOD GLUCOSE  164 (14 Feb 2023 11:34)      POCT Blood Glucose.: 173 mg/dL (14 Feb 2023 16:42)  POCT Blood Glucose.: 177 mg/dL (14 Feb 2023 07:06)  POCT Blood Glucose.: 259 mg/dL (13 Feb 2023 22:26)      Vital Signs Last 24 Hrs  T(C): 37.2 (14 Feb 2023 13:52), Max: 37.2 (14 Feb 2023 09:06)  T(F): 99 (14 Feb 2023 13:52), Max: 99 (14 Feb 2023 13:52)  HR: 74 (14 Feb 2023 13:52) (66 - 78)  BP: 119/62 (14 Feb 2023 13:52) (106/45 - 147/63)  BP(mean): --  RR: 16 (14 Feb 2023 13:52) (16 - 18)  SpO2: 95% (14 Feb 2023 13:52) (95% - 100%)    Parameters below as of 14 Feb 2023 13:52  Patient On (Oxygen Delivery Method): room air            PT/INR - ( 13 Feb 2023 06:48 )   PT: 13.2 sec;   INR: 1.14 ratio         PTT - ( 13 Feb 2023 06:48 )  PTT:29.7 sec        PHYSICAL EXAM:    Constitutional: NAD  HEENT: conjunctive   clear   Neck:  No JVD  Respiratory: CTAB  Cardiovascular: S1 and S2  Gastrointestinal: BS+, soft, NT/ND   right hand dressed. bilateral BKA   right arm AVF nontender

## 2023-02-14 NOTE — PROGRESS NOTE ADULT - SUBJECTIVE AND OBJECTIVE BOX
[facility] Medicine  Pager [phone]    Patient is a 47y old  Male who presents with a chief complaint of esrd on hd (13 Feb 2023 12:27)      INTERVAL HPI/OVERNIGHT EVENTS:    MEDICATIONS  (STANDING):  acetaminophen     Tablet .. 975 milliGRAM(s) Oral every 8 hours  ampicillin/sulbactam  IVPB 3 Gram(s) IV Intermittent every 24 hours  aspirin  chewable 81 milliGRAM(s) Oral daily  chlorhexidine 2% Cloths 1 Application(s) Topical <User Schedule>  dextrose 5%. 1000 milliLiter(s) (50 mL/Hr) IV Continuous <Continuous>  dextrose 5%. 1000 milliLiter(s) (100 mL/Hr) IV Continuous <Continuous>  dextrose 50% Injectable 25 Gram(s) IV Push once  dextrose 50% Injectable 12.5 Gram(s) IV Push once  dextrose 50% Injectable 25 Gram(s) IV Push once  dorzolamide 2%/timolol 0.5% Ophthalmic Solution 1 Drop(s) Both EYES two times a day  epoetin deidre-epbx (RETACRIT) Injectable 77088 Unit(s) IV Push <User Schedule>  glucagon  Injectable 1 milliGRAM(s) IntraMuscular once  heparin   Injectable 5000 Unit(s) SubCutaneous every 8 hours  insulin glargine Injectable (LANTUS) 5 Unit(s) SubCutaneous at bedtime  insulin lispro (ADMELOG) corrective regimen sliding scale   SubCutaneous three times a day before meals  insulin lispro (ADMELOG) corrective regimen sliding scale   SubCutaneous at bedtime  lactated ringers. 1000 milliLiter(s) (75 mL/Hr) IV Continuous <Continuous>  midodrine. 10 milliGRAM(s) Oral three times a day  multivitamin 1 Tablet(s) Oral daily    MEDICATIONS  (PRN):  bisacodyl Suppository 10 milliGRAM(s) Rectal daily PRN If no bowel movement  dextrose Oral Gel 15 Gram(s) Oral once PRN Blood Glucose LESS THAN 70 milliGRAM(s)/deciliter  lactulose Syrup 10 Gram(s) Oral daily PRN Constipation  oxyCODONE    IR 5 milliGRAM(s) Oral every 6 hours PRN Moderate Pain (4 - 6)      Allergies    No Known Drug Allergies  [place] (Rash)    Intolerances        REVIEW OF SYSTEMS:  Please see interval HPI:    Vital Signs Last 24 Hrs  T(C): 37.2 (14 Feb 2023 09:06), Max: 37.2 (13 Feb 2023 13:32)  T(F): 98.9 (14 Feb 2023 09:06), Max: 98.9 (13 Feb 2023 13:32)  HR: 77 (14 Feb 2023 09:06) (61 - 78)  BP: 128/60 (14 Feb 2023 09:06) (106/45 - 147/63)  BP(mean): --  RR: 17 (14 Feb 2023 09:06) (16 - 18)  SpO2: 97% (14 Feb 2023 09:06) (95% - 100%)    Parameters below as of 14 Feb 2023 09:06  Patient On (Oxygen Delivery Method): room air      I&O's Detail    13 Feb 2023 07:01  -  14 Feb 2023 07:00  --------------------------------------------------------  IN:    Other (mL): 600 mL  Total IN: 600 mL    OUT:    Other (mL): 1150 mL    VAC (Vacuum Assisted Closure) System (mL): 50 mL  Total OUT: 1200 mL    Total NET: -600 mL            PHYSICAL EXAM:  GENERAL:   HEAD:    EYES:   ENMT:   NECK:   NERVOUS SYSTEM:    CHEST/LUNG:   HEART:   ABDOMEN:   EXTREMITIES:    LYMPH:   SKIN:     LABS:      Ca    8.9        13 Feb 2023 06:48      PT/INR - ( 13 Feb 2023 06:48 )   PT: 13.2 sec;   INR: 1.14 ratio         PTT - ( 13 Feb 2023 06:48 )  PTT:29.7 sec  CAPILLARY BLOOD GLUCOSE      POCT Blood Glucose.: 177 mg/dL (14 Feb 2023 07:06)  POCT Blood Glucose.: 259 mg/dL (13 Feb 2023 22:26)  POCT Blood Glucose.: 141 mg/dL (13 Feb 2023 17:33)    BLOOD CULTURE    RADIOLOGY & ADDITIONAL TESTS:    Imaging Personally Reviewed:  [ ] YES     Consultant(s) Notes Reviewed:      Care Discussed with Consultants/Other Providers: [facility] Medicine  Pager [phone]    Patient is a 47y old  Male who presents with a chief complaint of esrd on hd (13 Feb 2023 12:27)      INTERVAL HPI/OVERNIGHT EVENTS:  Declines offer of Icelandic phone , speaks to provider in English.   No acute complaints today, is waiting for procedure, wishes provider a Happy Clifton's day.     MEDICATIONS  (STANDING):  acetaminophen     Tablet .. 975 milliGRAM(s) Oral every 8 hours  ampicillin/sulbactam  IVPB 3 Gram(s) IV Intermittent every 24 hours  aspirin  chewable 81 milliGRAM(s) Oral daily  chlorhexidine 2% Cloths 1 Application(s) Topical <User Schedule>  dextrose 5%. 1000 milliLiter(s) (50 mL/Hr) IV Continuous <Continuous>  dextrose 5%. 1000 milliLiter(s) (100 mL/Hr) IV Continuous <Continuous>  dextrose 50% Injectable 25 Gram(s) IV Push once  dextrose 50% Injectable 12.5 Gram(s) IV Push once  dextrose 50% Injectable 25 Gram(s) IV Push once  dorzolamide 2%/timolol 0.5% Ophthalmic Solution 1 Drop(s) Both EYES two times a day  epoetin deidre-epbx (RETACRIT) Injectable 92194 Unit(s) IV Push <User Schedule>  glucagon  Injectable 1 milliGRAM(s) IntraMuscular once  heparin   Injectable 5000 Unit(s) SubCutaneous every 8 hours  insulin glargine Injectable (LANTUS) 5 Unit(s) SubCutaneous at bedtime  insulin lispro (ADMELOG) corrective regimen sliding scale   SubCutaneous three times a day before meals  insulin lispro (ADMELOG) corrective regimen sliding scale   SubCutaneous at bedtime  lactated ringers. 1000 milliLiter(s) (75 mL/Hr) IV Continuous <Continuous>  midodrine. 10 milliGRAM(s) Oral three times a day  multivitamin 1 Tablet(s) Oral daily    MEDICATIONS  (PRN):  bisacodyl Suppository 10 milliGRAM(s) Rectal daily PRN If no bowel movement  dextrose Oral Gel 15 Gram(s) Oral once PRN Blood Glucose LESS THAN 70 milliGRAM(s)/deciliter  lactulose Syrup 10 Gram(s) Oral daily PRN Constipation  oxyCODONE    IR 5 milliGRAM(s) Oral every 6 hours PRN Moderate Pain (4 - 6)      Allergies    No Known Drug Allergies  [place] (Rash)    Intolerances        REVIEW OF SYSTEMS:  Please see interval HPI:    Vital Signs Last 24 Hrs  T(C): 37.2 (14 Feb 2023 09:06), Max: 37.2 (13 Feb 2023 13:32)  T(F): 98.9 (14 Feb 2023 09:06), Max: 98.9 (13 Feb 2023 13:32)  HR: 77 (14 Feb 2023 09:06) (61 - 78)  BP: 128/60 (14 Feb 2023 09:06) (106/45 - 147/63)  RR: 17 (14 Feb 2023 09:06) (16 - 18)  SpO2: 97% (14 Feb 2023 09:06) (95% - 100%)    Parameters below as of 14 Feb 2023 09:06  Patient On (Oxygen Delivery Method): room air      I&O's Detail    13 Feb 2023 07:01  -  14 Feb 2023 07:00  --------------------------------------------------------  IN:    Other (mL): 600 mL  Total IN: 600 mL    OUT:    Other (mL): 1150 mL    VAC (Vacuum Assisted Closure) System (mL): 50 mL  Total OUT: 1200 mL    Total NET: -600 mL      PHYSICAL EXAM:  GENERAL: NAD, sitting on couch  HEAD:  NC/AT  EYES: EOMI, clear sclera/conjunctiva  ENMT: MMM, hearing intact to voice  NECK: supple, no JVD  NERVOUS SYSTEM:  moving extremities, non-focal  CHEST/LUNG: Comfortable on RA, no respiratory distress, speaking in full sentences, no wheeze appreciated  EXTREMITIES:  R hand wound vac in place, s/p finger amputation, s/p b/l BKA, patient wearing LE prosthetics, +RUE AVF    LABS:                        10.1   9.86  )-----------( 453      ( 13 Feb 2023 06:48 )             32.5   02-13    137  |  93<L>  |  49<H>  ----------------------------<  107<H>  4.4   |  21<L>  |  10.41<H>    Ca    8.9      13 Feb 2023 06:48          PT/INR - ( 13 Feb 2023 06:48 )   PT: 13.2 sec;   INR: 1.14 ratio    PTT - ( 13 Feb 2023 06:48 )  PTT:29.7 sec    CAPILLARY BLOOD GLUCOSE  POCT Blood Glucose.: 177 mg/dL (14 Feb 2023 07:06)  POCT Blood Glucose.: 259 mg/dL (13 Feb 2023 22:26)  POCT Blood Glucose.: 141 mg/dL (13 Feb 2023 17:33)    BLOOD CULTURE    RADIOLOGY & ADDITIONAL TESTS:    Imaging Personally Reviewed:  [ ] YES     Consultant(s) Notes Reviewed:  Ortho    Care Discussed with Consultants/Other Providers: Carl Wilcox re: pending OR on 2/16, no other changes at this time

## 2023-02-15 LAB
ANION GAP SERPL CALC-SCNC: 19 MMOL/L — HIGH (ref 7–14)
APTT BLD: 29.6 SEC — SIGNIFICANT CHANGE UP (ref 27–36.3)
BUN SERPL-MCNC: 52 MG/DL — HIGH (ref 7–23)
CALCIUM SERPL-MCNC: 9 MG/DL — SIGNIFICANT CHANGE UP (ref 8.4–10.5)
CHLORIDE SERPL-SCNC: 95 MMOL/L — LOW (ref 98–107)
CO2 SERPL-SCNC: 21 MMOL/L — LOW (ref 22–31)
CREAT SERPL-MCNC: 9.13 MG/DL — HIGH (ref 0.5–1.3)
CULTURE RESULTS: SIGNIFICANT CHANGE UP
EGFR: 7 ML/MIN/1.73M2 — LOW
GLUCOSE BLDC GLUCOMTR-MCNC: 151 MG/DL — HIGH (ref 70–99)
GLUCOSE BLDC GLUCOMTR-MCNC: 211 MG/DL — HIGH (ref 70–99)
GLUCOSE BLDC GLUCOMTR-MCNC: 235 MG/DL — HIGH (ref 70–99)
GLUCOSE BLDC GLUCOMTR-MCNC: 99 MG/DL — SIGNIFICANT CHANGE UP (ref 70–99)
GLUCOSE SERPL-MCNC: 165 MG/DL — HIGH (ref 70–99)
HCT VFR BLD CALC: 32 % — LOW (ref 39–50)
HGB BLD-MCNC: 9.7 G/DL — LOW (ref 13–17)
INR BLD: 1.14 RATIO — SIGNIFICANT CHANGE UP (ref 0.88–1.16)
MCHC RBC-ENTMCNC: 27.6 PG — SIGNIFICANT CHANGE UP (ref 27–34)
MCHC RBC-ENTMCNC: 30.3 GM/DL — LOW (ref 32–36)
MCV RBC AUTO: 90.9 FL — SIGNIFICANT CHANGE UP (ref 80–100)
NRBC # BLD: 0 /100 WBCS — SIGNIFICANT CHANGE UP (ref 0–0)
NRBC # FLD: 0 K/UL — SIGNIFICANT CHANGE UP (ref 0–0)
PLATELET # BLD AUTO: 431 K/UL — HIGH (ref 150–400)
POTASSIUM SERPL-MCNC: 4.4 MMOL/L — SIGNIFICANT CHANGE UP (ref 3.5–5.3)
POTASSIUM SERPL-SCNC: 4.4 MMOL/L — SIGNIFICANT CHANGE UP (ref 3.5–5.3)
PROTHROM AB SERPL-ACNC: 13.3 SEC — SIGNIFICANT CHANGE UP (ref 10.5–13.4)
RBC # BLD: 3.52 M/UL — LOW (ref 4.2–5.8)
RBC # FLD: 15.9 % — HIGH (ref 10.3–14.5)
SODIUM SERPL-SCNC: 135 MMOL/L — SIGNIFICANT CHANGE UP (ref 135–145)
SPECIMEN SOURCE: SIGNIFICANT CHANGE UP
WBC # BLD: 8.55 K/UL — SIGNIFICANT CHANGE UP (ref 3.8–10.5)
WBC # FLD AUTO: 8.55 K/UL — SIGNIFICANT CHANGE UP (ref 3.8–10.5)

## 2023-02-15 PROCEDURE — 99232 SBSQ HOSP IP/OBS MODERATE 35: CPT

## 2023-02-15 RX ORDER — HEPARIN SODIUM 5000 [USP'U]/ML
5000 INJECTION INTRAVENOUS; SUBCUTANEOUS ONCE
Refills: 0 | Status: COMPLETED | OUTPATIENT
Start: 2023-02-15 | End: 2023-02-15

## 2023-02-15 RX ORDER — INSULIN GLARGINE 100 [IU]/ML
4 INJECTION, SOLUTION SUBCUTANEOUS AT BEDTIME
Refills: 0 | Status: DISCONTINUED | OUTPATIENT
Start: 2023-02-15 | End: 2023-02-24

## 2023-02-15 RX ADMIN — CHLORHEXIDINE GLUCONATE 1 APPLICATION(S): 213 SOLUTION TOPICAL at 10:05

## 2023-02-15 RX ADMIN — INSULIN GLARGINE 4 UNIT(S): 100 INJECTION, SOLUTION SUBCUTANEOUS at 22:10

## 2023-02-15 RX ADMIN — DORZOLAMIDE HYDROCHLORIDE TIMOLOL MALEATE 1 DROP(S): 20; 5 SOLUTION/ DROPS OPHTHALMIC at 16:48

## 2023-02-15 RX ADMIN — AMPICILLIN SODIUM AND SULBACTAM SODIUM 200 GRAM(S): 250; 125 INJECTION, POWDER, FOR SUSPENSION INTRAMUSCULAR; INTRAVENOUS at 01:45

## 2023-02-15 RX ADMIN — Medication 81 MILLIGRAM(S): at 11:40

## 2023-02-15 RX ADMIN — Medication 975 MILLIGRAM(S): at 01:43

## 2023-02-15 RX ADMIN — Medication 2: at 16:45

## 2023-02-15 RX ADMIN — Medication 975 MILLIGRAM(S): at 02:13

## 2023-02-15 RX ADMIN — DORZOLAMIDE HYDROCHLORIDE TIMOLOL MALEATE 1 DROP(S): 20; 5 SOLUTION/ DROPS OPHTHALMIC at 06:43

## 2023-02-15 RX ADMIN — Medication 975 MILLIGRAM(S): at 16:45

## 2023-02-15 RX ADMIN — MIDODRINE HYDROCHLORIDE 10 MILLIGRAM(S): 2.5 TABLET ORAL at 13:20

## 2023-02-15 RX ADMIN — Medication 975 MILLIGRAM(S): at 10:05

## 2023-02-15 RX ADMIN — Medication 1: at 07:46

## 2023-02-15 RX ADMIN — ERYTHROPOIETIN 10000 UNIT(S): 10000 INJECTION, SOLUTION INTRAVENOUS; SUBCUTANEOUS at 10:05

## 2023-02-15 RX ADMIN — HEPARIN SODIUM 5000 UNIT(S): 5000 INJECTION INTRAVENOUS; SUBCUTANEOUS at 22:12

## 2023-02-15 RX ADMIN — Medication 975 MILLIGRAM(S): at 10:35

## 2023-02-15 RX ADMIN — MIDODRINE HYDROCHLORIDE 10 MILLIGRAM(S): 2.5 TABLET ORAL at 22:12

## 2023-02-15 RX ADMIN — Medication 1 TABLET(S): at 11:40

## 2023-02-15 RX ADMIN — Medication 975 MILLIGRAM(S): at 17:17

## 2023-02-15 RX ADMIN — HEPARIN SODIUM 5000 UNIT(S): 5000 INJECTION INTRAVENOUS; SUBCUTANEOUS at 01:43

## 2023-02-15 NOTE — PROGRESS NOTE ADULT - SUBJECTIVE AND OBJECTIVE BOX
ORTHO PROGRESS NOTE     Pt seen and examined at bedside, denies SOB, CP, Dizziness. N/V/D /HA.  No significant overnight events. Pain well controlled.    Vital Signs Last 24 Hrs  T(C): 37 (15 Feb 2023 01:43), Max: 37.2 (14 Feb 2023 09:06)  T(F): 98.6 (15 Feb 2023 01:43), Max: 99 (14 Feb 2023 13:52)  HR: 67 (15 Feb 2023 01:43) (65 - 77)  BP: 149/74 (15 Feb 2023 01:43) (110/50 - 149/74)  BP(mean): --  RR: 16 (15 Feb 2023 01:43) (16 - 17)  SpO2: 100% (15 Feb 2023 01:43) (95% - 100%)    Parameters below as of 15 Feb 2023 01:43  Patient On (Oxygen Delivery Method): room air        Physical exam:  Gen: NAD, resting comfortably  Resp: Nonlabored  R UE: vac in place with good seal, SILT  Able to spontaneously move fingers        Labs:  CBC Full  -  ( 13 Feb 2023 06:48 )  WBC Count : 9.86 K/uL  RBC Count : 3.61 M/uL  Hemoglobin : 10.1 g/dL  Hematocrit : 32.5 %  Platelet Count - Automated : 453 K/uL  Mean Cell Volume : 90.0 fL  Mean Cell Hemoglobin : 28.0 pg  Mean Cell Hemoglobin Concentration : 31.1 gm/dL  Auto Neutrophil # : x  Auto Lymphocyte # : x  Auto Monocyte # : x  Auto Eosinophil # : x  Auto Basophil # : x  Auto Neutrophil % : x  Auto Lymphocyte % : x  Auto Monocyte % : x  Auto Eosinophil % : x  Auto Basophil % : x      02-13    137  |  93<L>  |  49<H>  ----------------------------<  107<H>  4.4   |  21<L>  |  10.41<H>    Ca    8.9      13 Feb 2023 06:48          47y y/o Male 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 Repeat I+D on 1/14, 1/16, 1/19, 1/25, 2/7.    - PT- nonweight bearing right upper extremity  - Pain control  - DVT prophylaxis- ASA daily/ venodynes   - Appreciate ID recs  - Appreciate ophthalmology recs: outpatient follow-up  - Plan for OR vac exchange 2/16  - Hold DVT PPX  - Appreciate med clearance  - Dispo: TBD

## 2023-02-15 NOTE — PROGRESS NOTE ADULT - ASSESSMENT
46 yo M w/ ESRD on HD, PVD s/p b/l BKA, DM2 c/b diabetic retinopathy, initially presenting to [facility] w/ R finger swelling and pain, found to have steal syndrome, transferred to [facility] for further management, s/p revision of AVF w/ vascular surgery on 1/13, s/p R 3rd finger amputation and repeated I&D with ortho (1/14, 1/16, 1/19, 1/25, 2/2, 2/7), planned for return to OR on 2/16 for VAC exchange.     # Steal syndrome as complication of dialysis access, c/b R 3rd digit gangrene  - s/p RUE fistula repair on 1/13  - s/p R 3rd finger amputation and repeated I&D w/ ortho  - c/w pain management as per ortho  - c/w bowel regimen to prevent opioid induced constipation  - ID recs appreciated, wound cx polymicrobial (E. coli, strep, bacteroides)  - c/w IV unasyn as per ID, plan for 6 weeks of abx til 2/21, can convert to Augmentin if discharged, c/w IV Unasyn while inpatient  - plan to return to OR on 2/16 for VAC exchange, ?possible repeat I&D, will be NPO after MN for procedure  - pre-op assessment: RCRI = 2 (ESRD, diabetes on insulin therapy), no signs/symptoms of ACS or decompensated heart failure, can RTOR without need for further cardiac testing, is optimized for procedure    #  Hypotension  - improved, on midodrine 10 tid    # DM2 on long term insulin therapy, c/b diabetic retinopathy  - c/w Lantus and sliding scale coverage, as will be NPO after MN, will decrease Lantus dose from 5U to 4U in anticipation of NPO status  - continue to monitor FS and adjust regimen as needed  - A1C 7.1 in 1/10  - consider checking fasting lipid profile, given diabetes and hx of PVD, patient may benefit from statin therapy  - ophthalmology recs appreciated, severe proliferative diabetic retinopathy, c/w cosopt, otpt ophthalmology followup    # ESRD on HD  - c/w HD as per renal  - anemia of renal disease, c/w epo during HD as per renal  - renal restricted diet w/ nepro supplements    VTE ppx: HSQ    Dispo: pending ortho procedure, further reassessment

## 2023-02-15 NOTE — PROGRESS NOTE ADULT - ASSESSMENT
Patient is a 46 y/o male PMH DM2, Bilateral BKA, ESRD (on HD MWF), last dialyzed yesterday transferred from [facility] emergently for evaluation of evaluation of right finger swelling/pain. Patient reports history of right finger pain after he had a fall one year ago. Patient had a wound on her second/middle finger who he was seeing a hand surgeon for outpatient but unable ultimately to continue seeing due to insurance issues. Patient reports his middle finger developed increased swelling and became black in color about two weeks ago. Denies fevers. Patient transferred to [facility] for further evaluation and emergent OR. Patient received broad spectrum Abx of Zosyn/vanco/clindamycin at [place]. Interpretor phone used for translation with patient. ID# [id number]        f/u  blood and urine cx,serial lactate levels,monitor vitals closley,ivfs hydration,monitor urine output and renal profile,iv abx as per id cons  ampicillin/sulbactam  IVPB 3 Gram(s) IV Intermittent every 24 hours    esrd on hd mwf   Excess fluids and waste products will be removed from your blood; your electrolytes will be balanced; your blood pressure will be controlled.    BP monitoring,continue current antihypertensive meds, low salt diet,followup with PMD in 1-2 weeks      ANEMIA PLAN:  Anemia of chronic disease:  Well controlled by epo  H and H subtherapeutic .  We will continue epo aiming for a HCT of 32-36 %.   We will monitor Iron stores, B12 and RBC folate .  epoetin deidre-epbx (RETACRIT) Injectable 48244 Unit(s) IV Push     Accuchecks monitoring and insulin sliding scale coverage, no concentrated sweets diet, serial labs and f/up with PMD, monitor HB A 1 C every 3-4 mnth  piperacillin/tazobactam IVPB.. 3.375 Gram(s) IV Intermittent every 12 hours

## 2023-02-15 NOTE — ADVANCED PRACTICE NURSE CONSULT - ASSESSMENT
Left arm cleansed with CHG. Under ultrasound guidance, placed Arrow Endurance Extended Dwell Peripheral Catheter System 20G / 6cm into Left Basilic Vein. Brisk  blood return, flushed with 20mls normal saline. Minimal blood loss. Patient tolerated procedure well. CHG dressing placed. All sharps accounted for.

## 2023-02-15 NOTE — PROGRESS NOTE ADULT - SUBJECTIVE AND OBJECTIVE BOX
Patient is a 47y Male whom presented to the hospital with esrd on hd     PAST MEDICAL & SURGICAL HISTORY:      MEDICATIONS  (STANDING):  dextrose 5%. 1000 milliLiter(s) (100 mL/Hr) IV Continuous <Continuous>  dextrose 5%. 1000 milliLiter(s) (50 mL/Hr) IV Continuous <Continuous>  dextrose 50% Injectable 25 Gram(s) IV Push once  dextrose 50% Injectable 25 Gram(s) IV Push once  dextrose 50% Injectable 12.5 Gram(s) IV Push once  glucagon  Injectable 1 milliGRAM(s) IntraMuscular once  insulin lispro (ADMELOG) corrective regimen sliding scale   SubCutaneous every 6 hours  pantoprazole    Tablet 40 milliGRAM(s) Oral daily  polyethylene glycol 3350 17 Gram(s) Oral daily  senna 2 Tablet(s) Oral at bedtime      Allergies    No Known Allergies    Intolerances        SOCIAL HISTORY:  Denies ETOh,Smoking,     FAMILY HISTORY:      REVIEW OF SYSTEMS:    CONSTITUTIONAL: No weakness, fevers or chills  NECK: No pain or stiffness  RESPIRATORY: No cough, wheezing, hemoptysis; No shortness of breath  CARDIOVASCULAR: No chest pain or palpitations  GASTROINTESTINAL: No abdominal or epigastric pain. No nausea, vomiting,                                              9.7    8.55  )-----------( 431      ( 15 Feb 2023 07:00 )             32.0       CBC Full  -  ( 15 Feb 2023 07:00 )  WBC Count : 8.55 K/uL  RBC Count : 3.52 M/uL  Hemoglobin : 9.7 g/dL  Hematocrit : 32.0 %  Platelet Count - Automated : 431 K/uL  Mean Cell Volume : 90.9 fL  Mean Cell Hemoglobin : 27.6 pg  Mean Cell Hemoglobin Concentration : 30.3 gm/dL  Auto Neutrophil # : x  Auto Lymphocyte # : x  Auto Monocyte # : x  Auto Eosinophil # : x  Auto Basophil # : x  Auto Neutrophil % : x  Auto Lymphocyte % : x  Auto Monocyte % : x  Auto Eosinophil % : x  Auto Basophil % : x      02-15    135  |  95<L>  |  52<H>  ----------------------------<  165<H>  4.4   |  21<L>  |  9.13<H>    Ca    9.0      15 Feb 2023 07:00        CAPILLARY BLOOD GLUCOSE      POCT Blood Glucose.: 235 mg/dL (15 Feb 2023 16:40)  POCT Blood Glucose.: 99 mg/dL (15 Feb 2023 11:12)  POCT Blood Glucose.: 151 mg/dL (15 Feb 2023 07:13)  POCT Blood Glucose.: 175 mg/dL (14 Feb 2023 22:15)      Vital Signs Last 24 Hrs  T(C): 36.6 (15 Feb 2023 18:15), Max: 37 (15 Feb 2023 01:43)  T(F): 97.8 (15 Feb 2023 18:15), Max: 98.6 (15 Feb 2023 01:43)  HR: 65 (15 Feb 2023 18:15) (62 - 68)  BP: 139/74 (15 Feb 2023 18:15) (110/50 - 165/80)  BP(mean): --  RR: 18 (15 Feb 2023 18:15) (16 - 18)  SpO2: 100% (15 Feb 2023 18:15) (100% - 100%)    Parameters below as of 15 Feb 2023 18:15  Patient On (Oxygen Delivery Method): room air            PT/INR - ( 15 Feb 2023 07:00 )   PT: 13.3 sec;   INR: 1.14 ratio         PTT - ( 15 Feb 2023 07:00 )  PTT:29.6 sec    PHYSICAL EXAM:    Constitutional: NAD  HEENT: conjunctive   clear   Neck:  No JVD  Respiratory: CTAB  Cardiovascular: S1 and S2  Gastrointestinal: BS+, soft, NT/ND   right hand dressed. bilateral BKA   right arm AVF nontender

## 2023-02-15 NOTE — PROGRESS NOTE ADULT - SUBJECTIVE AND OBJECTIVE BOX
[facility]  Pager [phone]    Patient is a 47y old  Male who presents with a chief complaint of esrd on hd (14 Feb 2023 17:40)      INTERVAL HPI/OVERNIGHT EVENTS:    MEDICATIONS  (STANDING):  acetaminophen     Tablet .. 975 milliGRAM(s) Oral every 8 hours  ampicillin/sulbactam  IVPB 3 Gram(s) IV Intermittent every 24 hours  aspirin  chewable 81 milliGRAM(s) Oral daily  chlorhexidine 2% Cloths 1 Application(s) Topical <User Schedule>  dextrose 5%. 1000 milliLiter(s) (50 mL/Hr) IV Continuous <Continuous>  dextrose 5%. 1000 milliLiter(s) (100 mL/Hr) IV Continuous <Continuous>  dextrose 50% Injectable 25 Gram(s) IV Push once  dextrose 50% Injectable 12.5 Gram(s) IV Push once  dextrose 50% Injectable 25 Gram(s) IV Push once  dorzolamide 2%/timolol 0.5% Ophthalmic Solution 1 Drop(s) Both EYES two times a day  epoetin deidre-epbx (RETACRIT) Injectable 23702 Unit(s) IV Push <User Schedule>  glucagon  Injectable 1 milliGRAM(s) IntraMuscular once  heparin   Injectable 5000 Unit(s) SubCutaneous once  insulin glargine Injectable (LANTUS) 5 Unit(s) SubCutaneous at bedtime  insulin lispro (ADMELOG) corrective regimen sliding scale   SubCutaneous three times a day before meals  insulin lispro (ADMELOG) corrective regimen sliding scale   SubCutaneous at bedtime  midodrine. 10 milliGRAM(s) Oral three times a day  multivitamin 1 Tablet(s) Oral daily    MEDICATIONS  (PRN):  bisacodyl Suppository 10 milliGRAM(s) Rectal daily PRN If no bowel movement  dextrose Oral Gel 15 Gram(s) Oral once PRN Blood Glucose LESS THAN 70 milliGRAM(s)/deciliter  lactulose Syrup 10 Gram(s) Oral daily PRN Constipation  oxyCODONE    IR 5 milliGRAM(s) Oral every 6 hours PRN Moderate Pain (4 - 6)      Allergies    No Known Drug Allergies  [place] (Rash)    Intolerances        REVIEW OF SYSTEMS:  Please see interval HPI:    Vital Signs Last 24 Hrs  T(C): 36.7 (15 Feb 2023 10:40), Max: 37.2 (14 Feb 2023 13:52)  T(F): 98 (15 Feb 2023 10:40), Max: 99 (14 Feb 2023 13:52)  HR: 62 (15 Feb 2023 10:40) (62 - 74)  BP: 120/64 (15 Feb 2023 10:40) (110/50 - 165/80)  BP(mean): --  RR: 18 (15 Feb 2023 10:40) (16 - 18)  SpO2: 100% (15 Feb 2023 07:00) (95% - 100%)    Parameters below as of 15 Feb 2023 10:40  Patient On (Oxygen Delivery Method): room air      I&O's Detail    14 Feb 2023 07:01  -  15 Feb 2023 07:00  --------------------------------------------------------  IN:    IV PiggyBack: 100 mL    Oral Fluid: 150 mL  Total IN: 250 mL    OUT:  Total OUT: 0 mL    Total NET: 250 mL      15 Feb 2023 07:01  -  15 Feb 2023 11:36  --------------------------------------------------------  IN:    Other (mL): 500 mL  Total IN: 500 mL    OUT:    Other (mL): 2000 mL  Total OUT: 2000 mL    Total NET: -1500 mL            PHYSICAL EXAM:  GENERAL:   HEAD:    EYES:   ENMT:   NECK:   NERVOUS SYSTEM:    CHEST/LUNG:   HEART:   ABDOMEN:   EXTREMITIES:    LYMPH:   SKIN:     LABS:                        9.7    8.55  )-----------( 431      ( 15 Feb 2023 07:00 )             32.0     15 Feb 2023 07:00    135    |  95     |  52     ----------------------------<  165    4.4     |  21     |  9.13     Ca    9.0        15 Feb 2023 07:00      PT/INR - ( 15 Feb 2023 07:00 )   PT: 13.3 sec;   INR: 1.14 ratio         PTT - ( 15 Feb 2023 07:00 )  PTT:29.6 sec  CAPILLARY BLOOD GLUCOSE      POCT Blood Glucose.: 99 mg/dL (15 Feb 2023 11:12)  POCT Blood Glucose.: 151 mg/dL (15 Feb 2023 07:13)  POCT Blood Glucose.: 175 mg/dL (14 Feb 2023 22:15)  POCT Blood Glucose.: 173 mg/dL (14 Feb 2023 16:42)    BLOOD CULTURE    RADIOLOGY & ADDITIONAL TESTS:    Imaging Personally Reviewed:  [ ] YES     Consultant(s) Notes Reviewed:      Care Discussed with Consultants/Other Providers: [facility]  Pager [phone]    Patient is a 47y old  Male who presents with a chief complaint of esrd on hd (14 Feb 2023 17:40)      INTERVAL HPI/OVERNIGHT EVENTS:  Declined , speaks to provider in English.   Just finished dialysis in room. No acute complaints at this time. Understands plan for procedure tomorrow.  Will be NPO after MN, discussed plan to decrease insulin dose slightly in anticipation, patient in agreement.     MEDICATIONS  (STANDING):  acetaminophen     Tablet .. 975 milliGRAM(s) Oral every 8 hours  ampicillin/sulbactam  IVPB 3 Gram(s) IV Intermittent every 24 hours  aspirin  chewable 81 milliGRAM(s) Oral daily  chlorhexidine 2% Cloths 1 Application(s) Topical <User Schedule>  dextrose 5%. 1000 milliLiter(s) (50 mL/Hr) IV Continuous <Continuous>  dextrose 5%. 1000 milliLiter(s) (100 mL/Hr) IV Continuous <Continuous>  dextrose 50% Injectable 25 Gram(s) IV Push once  dextrose 50% Injectable 12.5 Gram(s) IV Push once  dextrose 50% Injectable 25 Gram(s) IV Push once  dorzolamide 2%/timolol 0.5% Ophthalmic Solution 1 Drop(s) Both EYES two times a day  epoetin deidre-epbx (RETACRIT) Injectable 92349 Unit(s) IV Push <User Schedule>  glucagon  Injectable 1 milliGRAM(s) IntraMuscular once  heparin   Injectable 5000 Unit(s) SubCutaneous once  insulin glargine Injectable (LANTUS) 5 Unit(s) SubCutaneous at bedtime  insulin lispro (ADMELOG) corrective regimen sliding scale   SubCutaneous three times a day before meals  insulin lispro (ADMELOG) corrective regimen sliding scale   SubCutaneous at bedtime  midodrine. 10 milliGRAM(s) Oral three times a day  multivitamin 1 Tablet(s) Oral daily    MEDICATIONS  (PRN):  bisacodyl Suppository 10 milliGRAM(s) Rectal daily PRN If no bowel movement  dextrose Oral Gel 15 Gram(s) Oral once PRN Blood Glucose LESS THAN 70 milliGRAM(s)/deciliter  lactulose Syrup 10 Gram(s) Oral daily PRN Constipation  oxyCODONE    IR 5 milliGRAM(s) Oral every 6 hours PRN Moderate Pain (4 - 6)    Allergies  No Known Drug Allergies  [place] (Rash)    Intolerances    REVIEW OF SYSTEMS:  Please see interval HPI:    Vital Signs Last 24 Hrs  T(C): 36.7 (15 Feb 2023 10:40), Max: 37.2 (14 Feb 2023 13:52)  T(F): 98 (15 Feb 2023 10:40), Max: 99 (14 Feb 2023 13:52)  HR: 62 (15 Feb 2023 10:40) (62 - 74)  BP: 120/64 (15 Feb 2023 10:40) (110/50 - 165/80)  RR: 18 (15 Feb 2023 10:40) (16 - 18)  SpO2: 100% (15 Feb 2023 07:00) (95% - 100%)    Parameters below as of 15 Feb 2023 10:40  Patient On (Oxygen Delivery Method): room air    I&O's Detail    14 Feb 2023 07:01  -  15 Feb 2023 07:00  --------------------------------------------------------  IN:    IV PiggyBack: 100 mL    Oral Fluid: 150 mL  Total IN: 250 mL    OUT:  Total OUT: 0 mL    Total NET: 250 mL      15 Feb 2023 07:01  -  15 Feb 2023 11:36  --------------------------------------------------------  IN:    Other (mL): 500 mL  Total IN: 500 mL    OUT:    Other (mL): 2000 mL  Total OUT: 2000 mL    Total NET: -1500 mL      PHYSICAL EXAM:  GENERAL: NAD, lying in bed, just finished HD session  HEAD: NC/AT  EYES: EOMI, clear sclera/conjunctiva  ENMT: MMM, hearing intact to voice  NECK: supple, no JVD  NERVOUS SYSTEM:  moving extremities, non-focal  CHEST/LUNG: Comfortable on RA, no respiratory distress, speaking in full sentences, no wheeze appreciated  EXTREMITIES:  R hand wound vac in place, s/p finger amputation, s/p b/l BKA, patient wearing LE prosthetics, +RUE AVF      LABS:                        9.7    8.55  )-----------( 431      ( 15 Feb 2023 07:00 )             32.0     15 Feb 2023 07:00    135    |  95     |  52     ----------------------------<  165    4.4     |  21     |  9.13     Ca    9.0        15 Feb 2023 07:00    PT/INR - ( 15 Feb 2023 07:00 )   PT: 13.3 sec;   INR: 1.14 ratio    PTT - ( 15 Feb 2023 07:00 )  PTT:29.6 sec    CAPILLARY BLOOD GLUCOSE  POCT Blood Glucose.: 99 mg/dL (15 Feb 2023 11:12)  POCT Blood Glucose.: 151 mg/dL (15 Feb 2023 07:13)  POCT Blood Glucose.: 175 mg/dL (14 Feb 2023 22:15)  POCT Blood Glucose.: 173 mg/dL (14 Feb 2023 16:42)    BLOOD CULTURE    RADIOLOGY & ADDITIONAL TESTS:    Imaging Personally Reviewed:  [ ] YES     Consultant(s) Notes Reviewed:  Ortho    Care Discussed with Consultants/Other Providers: Carl Lange re: NPO after MN for procedure tomorrow, anticipate that procedure will be in afternoon, so patient will be NPO for most of day, plan to decrease Lantus dose in anticipation

## 2023-02-15 NOTE — PROGRESS NOTE ADULT - NUTRITIONAL ASSESSMENT
MEDICATIONS  (STANDING):  acetaminophen     Tablet .. 975 milliGRAM(s) Oral every 8 hours  ampicillin/sulbactam  IVPB 3 Gram(s) IV Intermittent every 24 hours  aspirin  chewable 81 milliGRAM(s) Oral daily  chlorhexidine 2% Cloths 1 Application(s) Topical <User Schedule>  dorzolamide 2%/timolol 0.5% Ophthalmic Solution 1 Drop(s) Both EYES two times a day  epoetin deidre-epbx (RETACRIT) Injectable 77356 Unit(s) IV Push <User Schedule>  insulin lispro (ADMELOG) corrective regimen sliding scale   SubCutaneous at bedtime  insulin lispro (ADMELOG) corrective regimen sliding scale   SubCutaneous three times a day before meals  midodrine. 10 milliGRAM(s) Oral three times a day  multivitamin 1 Tablet(s) Oral daily

## 2023-02-16 ENCOUNTER — APPOINTMENT (OUTPATIENT)
Dept: ORTHOPEDIC SURGERY | Facility: HOSPITAL | Age: 48
End: 2023-02-16

## 2023-02-16 ENCOUNTER — TRANSCRIPTION ENCOUNTER (OUTPATIENT)
Age: 48
End: 2023-02-16

## 2023-02-16 LAB
ANION GAP SERPL CALC-SCNC: 19 MMOL/L — HIGH (ref 7–14)
APTT BLD: 30.4 SEC — SIGNIFICANT CHANGE UP (ref 27–36.3)
BLD GP AB SCN SERPL QL: NEGATIVE — SIGNIFICANT CHANGE UP
BUN SERPL-MCNC: 36 MG/DL — HIGH (ref 7–23)
CALCIUM SERPL-MCNC: 9.3 MG/DL — SIGNIFICANT CHANGE UP (ref 8.4–10.5)
CHLORIDE SERPL-SCNC: 95 MMOL/L — LOW (ref 98–107)
CO2 SERPL-SCNC: 21 MMOL/L — LOW (ref 22–31)
CREAT SERPL-MCNC: 6.6 MG/DL — HIGH (ref 0.5–1.3)
EGFR: 10 ML/MIN/1.73M2 — LOW
GLUCOSE BLDC GLUCOMTR-MCNC: 138 MG/DL — HIGH (ref 70–99)
GLUCOSE BLDC GLUCOMTR-MCNC: 179 MG/DL — HIGH (ref 70–99)
GLUCOSE BLDC GLUCOMTR-MCNC: 193 MG/DL — HIGH (ref 70–99)
GLUCOSE BLDC GLUCOMTR-MCNC: 267 MG/DL — HIGH (ref 70–99)
GLUCOSE SERPL-MCNC: 216 MG/DL — HIGH (ref 70–99)
HCT VFR BLD CALC: 34.9 % — LOW (ref 39–50)
HGB BLD-MCNC: 10.6 G/DL — LOW (ref 13–17)
INR BLD: 1.2 RATIO — HIGH (ref 0.88–1.16)
MCHC RBC-ENTMCNC: 27.5 PG — SIGNIFICANT CHANGE UP (ref 27–34)
MCHC RBC-ENTMCNC: 30.4 GM/DL — LOW (ref 32–36)
MCV RBC AUTO: 90.4 FL — SIGNIFICANT CHANGE UP (ref 80–100)
NRBC # BLD: 0 /100 WBCS — SIGNIFICANT CHANGE UP (ref 0–0)
NRBC # FLD: 0 K/UL — SIGNIFICANT CHANGE UP (ref 0–0)
PLATELET # BLD AUTO: 421 K/UL — HIGH (ref 150–400)
POTASSIUM SERPL-MCNC: 4.5 MMOL/L — SIGNIFICANT CHANGE UP (ref 3.5–5.3)
POTASSIUM SERPL-SCNC: 4.5 MMOL/L — SIGNIFICANT CHANGE UP (ref 3.5–5.3)
PROTHROM AB SERPL-ACNC: 14 SEC — HIGH (ref 10.5–13.4)
RBC # BLD: 3.86 M/UL — LOW (ref 4.2–5.8)
RBC # FLD: 15.5 % — HIGH (ref 10.3–14.5)
RH IG SCN BLD-IMP: POSITIVE — SIGNIFICANT CHANGE UP
SARS-COV-2 RNA SPEC QL NAA+PROBE: SIGNIFICANT CHANGE UP
SODIUM SERPL-SCNC: 135 MMOL/L — SIGNIFICANT CHANGE UP (ref 135–145)
WBC # BLD: 9.01 K/UL — SIGNIFICANT CHANGE UP (ref 3.8–10.5)
WBC # FLD AUTO: 9.01 K/UL — SIGNIFICANT CHANGE UP (ref 3.8–10.5)

## 2023-02-16 PROCEDURE — 99232 SBSQ HOSP IP/OBS MODERATE 35: CPT

## 2023-02-16 PROCEDURE — 71045 X-RAY EXAM CHEST 1 VIEW: CPT | Mod: 26

## 2023-02-16 RX ORDER — HEPARIN SODIUM 5000 [USP'U]/ML
5000 INJECTION INTRAVENOUS; SUBCUTANEOUS ONCE
Refills: 0 | Status: COMPLETED | OUTPATIENT
Start: 2023-02-16 | End: 2023-02-16

## 2023-02-16 RX ADMIN — MIDODRINE HYDROCHLORIDE 10 MILLIGRAM(S): 2.5 TABLET ORAL at 06:29

## 2023-02-16 RX ADMIN — Medication 975 MILLIGRAM(S): at 01:47

## 2023-02-16 RX ADMIN — DORZOLAMIDE HYDROCHLORIDE TIMOLOL MALEATE 1 DROP(S): 20; 5 SOLUTION/ DROPS OPHTHALMIC at 06:28

## 2023-02-16 RX ADMIN — HEPARIN SODIUM 5000 UNIT(S): 5000 INJECTION INTRAVENOUS; SUBCUTANEOUS at 18:08

## 2023-02-16 RX ADMIN — Medication 975 MILLIGRAM(S): at 11:36

## 2023-02-16 RX ADMIN — Medication 975 MILLIGRAM(S): at 21:30

## 2023-02-16 RX ADMIN — Medication 1: at 11:36

## 2023-02-16 RX ADMIN — Medication 81 MILLIGRAM(S): at 11:35

## 2023-02-16 RX ADMIN — Medication 975 MILLIGRAM(S): at 02:47

## 2023-02-16 RX ADMIN — CHLORHEXIDINE GLUCONATE 1 APPLICATION(S): 213 SOLUTION TOPICAL at 06:36

## 2023-02-16 RX ADMIN — DORZOLAMIDE HYDROCHLORIDE TIMOLOL MALEATE 1 DROP(S): 20; 5 SOLUTION/ DROPS OPHTHALMIC at 18:10

## 2023-02-16 RX ADMIN — Medication 1: at 21:25

## 2023-02-16 RX ADMIN — AMPICILLIN SODIUM AND SULBACTAM SODIUM 200 GRAM(S): 250; 125 INJECTION, POWDER, FOR SUSPENSION INTRAMUSCULAR; INTRAVENOUS at 02:35

## 2023-02-16 RX ADMIN — INSULIN GLARGINE 4 UNIT(S): 100 INJECTION, SOLUTION SUBCUTANEOUS at 21:25

## 2023-02-16 RX ADMIN — Medication 975 MILLIGRAM(S): at 22:30

## 2023-02-16 RX ADMIN — Medication 1: at 07:25

## 2023-02-16 NOTE — PROGRESS NOTE ADULT - SUBJECTIVE AND OBJECTIVE BOX
Patient is a 47y Male whom presented to the hospital with esrd on hd     PAST MEDICAL & SURGICAL HISTORY:      MEDICATIONS  (STANDING):  dextrose 5%. 1000 milliLiter(s) (100 mL/Hr) IV Continuous <Continuous>  dextrose 5%. 1000 milliLiter(s) (50 mL/Hr) IV Continuous <Continuous>  dextrose 50% Injectable 25 Gram(s) IV Push once  dextrose 50% Injectable 25 Gram(s) IV Push once  dextrose 50% Injectable 12.5 Gram(s) IV Push once  glucagon  Injectable 1 milliGRAM(s) IntraMuscular once  insulin lispro (ADMELOG) corrective regimen sliding scale   SubCutaneous every 6 hours  pantoprazole    Tablet 40 milliGRAM(s) Oral daily  polyethylene glycol 3350 17 Gram(s) Oral daily  senna 2 Tablet(s) Oral at bedtime      Allergies    No Known Allergies    Intolerances        SOCIAL HISTORY:  Denies ETOh,Smoking,     FAMILY HISTORY:      REVIEW OF SYSTEMS:    CONSTITUTIONAL: No weakness, fevers or chills  NECK: No pain or stiffness  RESPIRATORY: No cough, wheezing, hemoptysis; No shortness of breath  CARDIOVASCULAR: No chest pain or palpitations  GASTROINTESTINAL: No abdominal or epigastric pain. No nausea, vomiting,                                                                                                             10.6   9.01  )-----------( 421      ( 16 Feb 2023 06:05 )             34.9       CBC Full  -  ( 16 Feb 2023 06:05 )  WBC Count : 9.01 K/uL  RBC Count : 3.86 M/uL  Hemoglobin : 10.6 g/dL  Hematocrit : 34.9 %  Platelet Count - Automated : 421 K/uL  Mean Cell Volume : 90.4 fL  Mean Cell Hemoglobin : 27.5 pg  Mean Cell Hemoglobin Concentration : 30.4 gm/dL  Auto Neutrophil # : x  Auto Lymphocyte # : x  Auto Monocyte # : x  Auto Eosinophil # : x  Auto Basophil # : x  Auto Neutrophil % : x  Auto Lymphocyte % : x  Auto Monocyte % : x  Auto Eosinophil % : x  Auto Basophil % : x      02-16    135  |  95<L>  |  36<H>  ----------------------------<  216<H>  4.5   |  21<L>  |  6.60<H>    Ca    9.3      16 Feb 2023 06:05        CAPILLARY BLOOD GLUCOSE      POCT Blood Glucose.: 138 mg/dL (16 Feb 2023 17:48)  POCT Blood Glucose.: 193 mg/dL (16 Feb 2023 11:19)  POCT Blood Glucose.: 179 mg/dL (16 Feb 2023 07:16)  POCT Blood Glucose.: 211 mg/dL (15 Feb 2023 22:03)      Vital Signs Last 24 Hrs  T(C): 37.1 (16 Feb 2023 18:22), Max: 37.1 (15 Feb 2023 21:43)  T(F): 98.7 (16 Feb 2023 18:22), Max: 98.8 (15 Feb 2023 21:43)  HR: 60 (16 Feb 2023 18:22) (60 - 67)  BP: 128/64 (16 Feb 2023 18:22) (127/62 - 153/76)  BP(mean): --  RR: 17 (16 Feb 2023 18:22) (16 - 18)  SpO2: 100% (16 Feb 2023 18:22) (100% - 100%)    Parameters below as of 16 Feb 2023 18:22  Patient On (Oxygen Delivery Method): room air            PT/INR - ( 16 Feb 2023 06:05 )   PT: 14.0 sec;   INR: 1.20 ratio         PTT - ( 16 Feb 2023 06:05 )  PTT:30.4 sec    PHYSICAL EXAM:    Constitutional: NAD  HEENT: conjunctive   clear   Neck:  No JVD  Respiratory: CTAB  Cardiovascular: S1 and S2  Gastrointestinal: BS+, soft, NT/ND   right hand dressed. bilateral BKA   right arm AVF nontender

## 2023-02-16 NOTE — PROGRESS NOTE ADULT - SUBJECTIVE AND OBJECTIVE BOX
[facility] [address] [facility] Medicine  Pager [phone]    Patient is a 47y old  Male who presents with a chief complaint of esrd on hd (15 Feb 2023 18:45)      INTERVAL HPI/OVERNIGHT EVENTS:    MEDICATIONS  (STANDING):  acetaminophen     Tablet .. 975 milliGRAM(s) Oral every 8 hours  ampicillin/sulbactam  IVPB 3 Gram(s) IV Intermittent every 24 hours  aspirin  chewable 81 milliGRAM(s) Oral daily  chlorhexidine 2% Cloths 1 Application(s) Topical <User Schedule>  dextrose 5%. 1000 milliLiter(s) (50 mL/Hr) IV Continuous <Continuous>  dextrose 5%. 1000 milliLiter(s) (100 mL/Hr) IV Continuous <Continuous>  dextrose 50% Injectable 25 Gram(s) IV Push once  dextrose 50% Injectable 12.5 Gram(s) IV Push once  dextrose 50% Injectable 25 Gram(s) IV Push once  dorzolamide 2%/timolol 0.5% Ophthalmic Solution 1 Drop(s) Both EYES two times a day  epoetin deidre-epbx (RETACRIT) Injectable 38240 Unit(s) IV Push <User Schedule>  glucagon  Injectable 1 milliGRAM(s) IntraMuscular once  insulin glargine Injectable (LANTUS) 4 Unit(s) SubCutaneous at bedtime  insulin lispro (ADMELOG) corrective regimen sliding scale   SubCutaneous three times a day before meals  insulin lispro (ADMELOG) corrective regimen sliding scale   SubCutaneous at bedtime  midodrine. 10 milliGRAM(s) Oral three times a day  multivitamin 1 Tablet(s) Oral daily    MEDICATIONS  (PRN):  bisacodyl Suppository 10 milliGRAM(s) Rectal daily PRN If no bowel movement  dextrose Oral Gel 15 Gram(s) Oral once PRN Blood Glucose LESS THAN 70 milliGRAM(s)/deciliter  lactulose Syrup 10 Gram(s) Oral daily PRN Constipation  oxyCODONE    IR 5 milliGRAM(s) Oral every 6 hours PRN Moderate Pain (4 - 6)      Allergies    No Known Drug Allergies  [place] (Rash)    Intolerances        REVIEW OF SYSTEMS:  Please see interval HPI:    Vital Signs Last 24 Hrs  T(C): 36.4 (16 Feb 2023 10:25), Max: 37.1 (15 Feb 2023 21:43)  T(F): 97.5 (16 Feb 2023 10:25), Max: 98.8 (15 Feb 2023 21:43)  HR: 60 (16 Feb 2023 10:25) (60 - 67)  BP: 153/76 (16 Feb 2023 10:25) (135/69 - 153/76)  BP(mean): --  RR: 16 (16 Feb 2023 10:25) (16 - 18)  SpO2: 100% (16 Feb 2023 10:25) (100% - 100%)    Parameters below as of 16 Feb 2023 10:25  Patient On (Oxygen Delivery Method): room air      I&O's Detail    15 Feb 2023 07:01  -  16 Feb 2023 07:00  --------------------------------------------------------  IN:    IV PiggyBack: 50 mL    Other (mL): 500 mL  Total IN: 550 mL    OUT:    Other (mL): 2000 mL  Total OUT: 2000 mL    Total NET: -1450 mL            PHYSICAL EXAM:  GENERAL:   HEAD:    EYES:   ENMT:   NECK:   NERVOUS SYSTEM:    CHEST/LUNG:   HEART:   ABDOMEN:   EXTREMITIES:    LYMPH:   SKIN:     LABS:                        10.6   9.01  )-----------( 421      ( 16 Feb 2023 06:05 )             34.9     16 Feb 2023 06:05    135    |  95     |  36     ----------------------------<  216    4.5     |  21     |  6.60     Ca    9.3        16 Feb 2023 06:05      PT/INR - ( 16 Feb 2023 06:05 )   PT: 14.0 sec;   INR: 1.20 ratio         PTT - ( 16 Feb 2023 06:05 )  PTT:30.4 sec  CAPILLARY BLOOD GLUCOSE      POCT Blood Glucose.: 193 mg/dL (16 Feb 2023 11:19)  POCT Blood Glucose.: 179 mg/dL (16 Feb 2023 07:16)  POCT Blood Glucose.: 211 mg/dL (15 Feb 2023 22:03)  POCT Blood Glucose.: 235 mg/dL (15 Feb 2023 16:40)    BLOOD CULTURE    RADIOLOGY & ADDITIONAL TESTS:    Imaging Personally Reviewed:  [ ] YES     Consultant(s) Notes Reviewed:      Care Discussed with Consultants/Other Providers: [facility] Medicine  Pager [phone]    Patient is a 47y old  Male who presents with a chief complaint of esrd on hd (15 Feb 2023 18:45)      INTERVAL HPI/OVERNIGHT EVENTS:  No acute complaints at this time, wants to know what time procedure will happen, discussed that it can be hard to provide exact timing (ie as preceding cases can be variable in length, events can occur etc) but is planned for today. Further plan to be determined based on results of procedure, patient verbalized understanding.     MEDICATIONS  (STANDING):  acetaminophen     Tablet .. 975 milliGRAM(s) Oral every 8 hours  ampicillin/sulbactam  IVPB 3 Gram(s) IV Intermittent every 24 hours  aspirin  chewable 81 milliGRAM(s) Oral daily  chlorhexidine 2% Cloths 1 Application(s) Topical <User Schedule>  dextrose 5%. 1000 milliLiter(s) (50 mL/Hr) IV Continuous <Continuous>  dextrose 5%. 1000 milliLiter(s) (100 mL/Hr) IV Continuous <Continuous>  dextrose 50% Injectable 25 Gram(s) IV Push once  dextrose 50% Injectable 12.5 Gram(s) IV Push once  dextrose 50% Injectable 25 Gram(s) IV Push once  dorzolamide 2%/timolol 0.5% Ophthalmic Solution 1 Drop(s) Both EYES two times a day  epoetin deidre-epbx (RETACRIT) Injectable 51070 Unit(s) IV Push <User Schedule>  glucagon  Injectable 1 milliGRAM(s) IntraMuscular once  insulin glargine Injectable (LANTUS) 4 Unit(s) SubCutaneous at bedtime  insulin lispro (ADMELOG) corrective regimen sliding scale   SubCutaneous three times a day before meals  insulin lispro (ADMELOG) corrective regimen sliding scale   SubCutaneous at bedtime  midodrine. 10 milliGRAM(s) Oral three times a day  multivitamin 1 Tablet(s) Oral daily    MEDICATIONS  (PRN):  bisacodyl Suppository 10 milliGRAM(s) Rectal daily PRN If no bowel movement  dextrose Oral Gel 15 Gram(s) Oral once PRN Blood Glucose LESS THAN 70 milliGRAM(s)/deciliter  lactulose Syrup 10 Gram(s) Oral daily PRN Constipation  oxyCODONE    IR 5 milliGRAM(s) Oral every 6 hours PRN Moderate Pain (4 - 6)      Allergies    No Known Drug Allergies  [place] (Rash)    Intolerances        REVIEW OF SYSTEMS:  Please see interval HPI:    Vital Signs Last 24 Hrs  T(C): 36.4 (16 Feb 2023 10:25), Max: 37.1 (15 Feb 2023 21:43)  T(F): 97.5 (16 Feb 2023 10:25), Max: 98.8 (15 Feb 2023 21:43)  HR: 60 (16 Feb 2023 10:25) (60 - 67)  BP: 153/76 (16 Feb 2023 10:25) (135/69 - 153/76)  BP(mean): --  RR: 16 (16 Feb 2023 10:25) (16 - 18)  SpO2: 100% (16 Feb 2023 10:25) (100% - 100%)    Parameters below as of 16 Feb 2023 10:25  Patient On (Oxygen Delivery Method): room air      I&O's Detail    15 Feb 2023 07:01  -  16 Feb 2023 07:00  --------------------------------------------------------  IN:    IV PiggyBack: 50 mL    Other (mL): 500 mL  Total IN: 550 mL    OUT:    Other (mL): 2000 mL  Total OUT: 2000 mL    Total NET: -1450 mL      PHYSICAL EXAM:  GENERAL: NAD, lying in bed  HEAD: NC/AT  EYES: EOMI, clear sclera/conjunctiva  ENMT: MMM, hearing intact to voice  NECK: supple, no JVD  NERVOUS SYSTEM:  moving extremities, non-focal  CHEST/LUNG: Comfortable on RA, no respiratory distress, speaking in full sentences, no wheeze appreciated  EXTREMITIES:  R hand wound vac in place, s/p finger amputation, s/p b/l BKA, prosthetics currently off, +RUE AVF    LABS:                        10.6   9.01  )-----------( 421      ( 16 Feb 2023 06:05 )             34.9     16 Feb 2023 06:05    135    |  95     |  36     ----------------------------<  216    4.5     |  21     |  6.60     Ca    9.3        16 Feb 2023 06:05      PT/INR - ( 16 Feb 2023 06:05 )   PT: 14.0 sec;   INR: 1.20 ratio         PTT - ( 16 Feb 2023 06:05 )  PTT:30.4 sec    CAPILLARY BLOOD GLUCOSE  POCT Blood Glucose.: 193 mg/dL (16 Feb 2023 11:19)  POCT Blood Glucose.: 179 mg/dL (16 Feb 2023 07:16)  POCT Blood Glucose.: 211 mg/dL (15 Feb 2023 22:03)  POCT Blood Glucose.: 235 mg/dL (15 Feb 2023 16:40)    BLOOD CULTURE    RADIOLOGY & ADDITIONAL TESTS:    Imaging Personally Reviewed:  [ ] YES     Consultant(s) Notes Reviewed:  Ortho, Renal    Care Discussed with Consultants/Other Providers: Carl Montano re: plan for OR today

## 2023-02-16 NOTE — PROGRESS NOTE ADULT - NUTRITIONAL ASSESSMENT
MEDICATIONS  (STANDING):  acetaminophen     Tablet .. 975 milliGRAM(s) Oral every 8 hours  ampicillin/sulbactam  IVPB 3 Gram(s) IV Intermittent every 24 hours  aspirin  chewable 81 milliGRAM(s) Oral daily  chlorhexidine 2% Cloths 1 Application(s) Topical <User Schedule>  dorzolamide 2%/timolol 0.5% Ophthalmic Solution 1 Drop(s) Both EYES two times a day  epoetin deidre-epbx (RETACRIT) Injectable 71915 Unit(s) IV Push <User Schedule>  insulin lispro (ADMELOG) corrective regimen sliding scale   SubCutaneous at bedtime  insulin lispro (ADMELOG) corrective regimen sliding scale   SubCutaneous three times a day before meals  midodrine. 10 milliGRAM(s) Oral three times a day  multivitamin 1 Tablet(s) Oral daily

## 2023-02-16 NOTE — PROGRESS NOTE ADULT - SUBJECTIVE AND OBJECTIVE BOX
ORTHO PROGRESS NOTE     Pt seen and examined at bedside, denies SOB, CP, Dizziness. N/V/D /HA.  No significant overnight events. Pain well controlled.    Vital Signs Last 24 Hrs  T(C): 37.1 (15 Feb 2023 21:43), Max: 37.1 (15 Feb 2023 21:43)  T(F): 98.8 (15 Feb 2023 21:43), Max: 98.8 (15 Feb 2023 21:43)  HR: 62 (15 Feb 2023 21:43) (62 - 68)  BP: 139/66 (15 Feb 2023 21:43) (120/64 - 165/80)  BP(mean): --  RR: 18 (15 Feb 2023 21:43) (16 - 18)  SpO2: 100% (15 Feb 2023 21:43) (100% - 100%)    Parameters below as of 15 Feb 2023 21:43  Patient On (Oxygen Delivery Method): room air        Physical exam:  Gen: NAD, resting comfortably  Resp: Nonlabored  R UE: vac in place with good seal, SILT  Able to spontaneously move fingers      Labs:  CBC Full  -  ( 15 Feb 2023 07:00 )  WBC Count : 8.55 K/uL  RBC Count : 3.52 M/uL  Hemoglobin : 9.7 g/dL  Hematocrit : 32.0 %  Platelet Count - Automated : 431 K/uL  Mean Cell Volume : 90.9 fL  Mean Cell Hemoglobin : 27.6 pg  Mean Cell Hemoglobin Concentration : 30.3 gm/dL  Auto Neutrophil # : x  Auto Lymphocyte # : x  Auto Monocyte # : x  Auto Eosinophil # : x  Auto Basophil # : x  Auto Neutrophil % : x  Auto Lymphocyte % : x  Auto Monocyte % : x  Auto Eosinophil % : x  Auto Basophil % : x      02-15    135  |  95<L>  |  52<H>  ----------------------------<  165<H>  4.4   |  21<L>  |  9.13<H>    Ca    9.0      15 Feb 2023 07:00        47y y/o Male 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 Repeat I+D on 1/14, 1/16, 1/19, 1/25, 2/7.    - PT- nonweight bearing right upper extremity  - Pain control  - DVT prophylaxis- ASA daily/ venodynes   - Appreciate ID recs  - Appreciate ophthalmology recs: outpatient follow-up  - Plan for OR vac exchange 2/16  - Hold DVT PPX  - Appreciate med clearance  - Dispo: TBD

## 2023-02-16 NOTE — PROGRESS NOTE ADULT - ASSESSMENT
48 yo M w/ ESRD on HD, PVD s/p b/l BKA, DM2 c/b diabetic retinopathy, initially presenting to [facility] w/ R finger swelling and pain, found to have steal syndrome, transferred to [facility] for further management, s/p revision of AVF w/ vascular surgery on 1/13, s/p R 3rd finger amputation and repeated I&D with ortho (1/14, 1/16, 1/19, 1/25, 2/2, 2/7), planned for return to OR on 2/16 for VAC exchange.     # Steal syndrome as complication of dialysis access, c/b R 3rd digit gangrene  - s/p RUE fistula repair on 1/13  - s/p R 3rd finger amputation and repeated I&D w/ ortho  - c/w pain management as per ortho  - c/w bowel regimen to prevent opioid induced constipation  - ID recs appreciated, wound cx polymicrobial (E. coli, strep, bacteroides)  - c/w IV unasyn as per ID, plan for 6 weeks of abx til 2/21, can convert to Augmentin if discharged, c/w IV Unasyn while inpatient  - plan to return to OR on 2/16 for VAC exchange, ?possible repeat I&D, currently NPO for procedure  - pre-op assessment: RCRI = 2 (ESRD, diabetes on insulin therapy), no signs/symptoms of ACS or decompensated heart failure, can RTOR without need for further cardiac testing, is optimized for procedure    #  Hypotension  - improved, on midodrine 10 tid (w/ hold parameters)    # DM2 on long term insulin therapy, c/b diabetic retinopathy  - c/w Lantus and sliding scale coverage, as NPO for procedure today, Lantus dose was decreased 5U to 4U 12/15 in anticipation of NPO status  - continue to monitor FS and adjust regimen as needed (can likely increase Lantus dose once done w/ procedure and taking diet again)  - A1C 7.1 in 1/10  - consider checking fasting lipid profile, given diabetes and hx of PVD, patient may benefit from statin therapy  - ophthalmology recs appreciated, severe proliferative diabetic retinopathy, c/w cosopt, otpt ophthalmology followup    # ESRD on HD  - c/w HD as per renal  - anemia of renal disease, c/w epo during HD as per renal  - renal restricted diet w/ nepro supplements    VTE ppx: HSQ    Dispo: pending ortho procedure, further reassessment

## 2023-02-16 NOTE — PROGRESS NOTE ADULT - ASSESSMENT
Patient is a 48 y/o male PMH DM2, Bilateral BKA, ESRD (on HD MWF), last dialyzed yesterday transferred from [facility] emergently for evaluation of evaluation of right finger swelling/pain. Patient reports history of right finger pain after he had a fall one year ago. Patient had a wound on her second/middle finger who he was seeing a hand surgeon for outpatient but unable ultimately to continue seeing due to insurance issues. Patient reports his middle finger developed increased swelling and became black in color about two weeks ago. Denies fevers. Patient transferred to [facility] for further evaluation and emergent OR. Patient received broad spectrum Abx of Zosyn/vanco/clindamycin at [place]. Interpretor phone used for translation with patient. ID# [id number]        f/u  blood and urine cx,serial lactate levels,monitor vitals closley,ivfs hydration,monitor urine output and renal profile,iv abx as per id cons  ampicillin/sulbactam  IVPB 3 Gram(s) IV Intermittent every 24 hours    esrd on hd mwf   Excess fluids and waste products will be removed from your blood; your electrolytes will be balanced; your blood pressure will be controlled.    BP monitoring,continue current antihypertensive meds, low salt diet,followup with PMD in 1-2 weeks      ANEMIA PLAN:  Anemia of chronic disease:  Well controlled by epo  H and H subtherapeutic .  We will continue epo aiming for a HCT of 32-36 %.   We will monitor Iron stores, B12 and RBC folate .  epoetin deidre-epbx (RETACRIT) Injectable 97269 Unit(s) IV Push     Accuchecks monitoring and insulin sliding scale coverage, no concentrated sweets diet, serial labs and f/up with PMD, monitor HB A 1 C every 3-4 mnth  piperacillin/tazobactam IVPB.. 3.375 Gram(s) IV Intermittent every 12 hours

## 2023-02-17 LAB
ANION GAP SERPL CALC-SCNC: 20 MMOL/L — HIGH (ref 7–14)
APTT BLD: 28.1 SEC — SIGNIFICANT CHANGE UP (ref 27–36.3)
BUN SERPL-MCNC: 50 MG/DL — HIGH (ref 7–23)
CALCIUM SERPL-MCNC: 9 MG/DL — SIGNIFICANT CHANGE UP (ref 8.4–10.5)
CHLORIDE SERPL-SCNC: 94 MMOL/L — LOW (ref 98–107)
CO2 SERPL-SCNC: 19 MMOL/L — LOW (ref 22–31)
CREAT SERPL-MCNC: 8.72 MG/DL — HIGH (ref 0.5–1.3)
EGFR: 7 ML/MIN/1.73M2 — LOW
GLUCOSE BLDC GLUCOMTR-MCNC: 123 MG/DL — HIGH (ref 70–99)
GLUCOSE BLDC GLUCOMTR-MCNC: 126 MG/DL — HIGH (ref 70–99)
GLUCOSE BLDC GLUCOMTR-MCNC: 129 MG/DL — HIGH (ref 70–99)
GLUCOSE BLDC GLUCOMTR-MCNC: 148 MG/DL — HIGH (ref 70–99)
GLUCOSE BLDC GLUCOMTR-MCNC: 215 MG/DL — HIGH (ref 70–99)
GLUCOSE BLDC GLUCOMTR-MCNC: 229 MG/DL — HIGH (ref 70–99)
GLUCOSE BLDC GLUCOMTR-MCNC: 237 MG/DL — HIGH (ref 70–99)
GLUCOSE SERPL-MCNC: 193 MG/DL — HIGH (ref 70–99)
HCT VFR BLD CALC: 30.9 % — LOW (ref 39–50)
HGB BLD-MCNC: 9.5 G/DL — LOW (ref 13–17)
INR BLD: 1.14 RATIO — SIGNIFICANT CHANGE UP (ref 0.88–1.16)
MAGNESIUM SERPL-MCNC: 2.3 MG/DL — SIGNIFICANT CHANGE UP (ref 1.6–2.6)
MCHC RBC-ENTMCNC: 27.6 PG — SIGNIFICANT CHANGE UP (ref 27–34)
MCHC RBC-ENTMCNC: 30.7 GM/DL — LOW (ref 32–36)
MCV RBC AUTO: 89.8 FL — SIGNIFICANT CHANGE UP (ref 80–100)
NRBC # BLD: 0 /100 WBCS — SIGNIFICANT CHANGE UP (ref 0–0)
NRBC # FLD: 0 K/UL — SIGNIFICANT CHANGE UP (ref 0–0)
PHOSPHATE SERPL-MCNC: 6.6 MG/DL — HIGH (ref 2.5–4.5)
PLATELET # BLD AUTO: 443 K/UL — HIGH (ref 150–400)
POTASSIUM SERPL-MCNC: 4.6 MMOL/L — SIGNIFICANT CHANGE UP (ref 3.5–5.3)
POTASSIUM SERPL-SCNC: 4.6 MMOL/L — SIGNIFICANT CHANGE UP (ref 3.5–5.3)
PROTHROM AB SERPL-ACNC: 13.3 SEC — SIGNIFICANT CHANGE UP (ref 10.5–13.4)
RBC # BLD: 3.44 M/UL — LOW (ref 4.2–5.8)
RBC # FLD: 15.5 % — HIGH (ref 10.3–14.5)
SODIUM SERPL-SCNC: 133 MMOL/L — LOW (ref 135–145)
WBC # BLD: 8.15 K/UL — SIGNIFICANT CHANGE UP (ref 3.8–10.5)
WBC # FLD AUTO: 8.15 K/UL — SIGNIFICANT CHANGE UP (ref 3.8–10.5)

## 2023-02-17 PROCEDURE — 97605 NEG PRS WND THER DME<=50SQCM: CPT

## 2023-02-17 PROCEDURE — 11043 DBRDMT MUSC&/FSCA 1ST 20/<: CPT | Mod: 58,RT

## 2023-02-17 PROCEDURE — 99232 SBSQ HOSP IP/OBS MODERATE 35: CPT

## 2023-02-17 DEVICE — SHEET MESH MATRISTEM BURN MATRIX 10X15CM: Type: IMPLANTABLE DEVICE | Site: RIGHT | Status: FUNCTIONAL

## 2023-02-17 RX ORDER — HYDROMORPHONE HYDROCHLORIDE 2 MG/ML
0.5 INJECTION INTRAMUSCULAR; INTRAVENOUS; SUBCUTANEOUS
Refills: 0 | Status: DISCONTINUED | OUTPATIENT
Start: 2023-02-17 | End: 2023-02-17

## 2023-02-17 RX ORDER — ONDANSETRON 8 MG/1
4 TABLET, FILM COATED ORAL ONCE
Refills: 0 | Status: DISCONTINUED | OUTPATIENT
Start: 2023-02-17 | End: 2023-02-17

## 2023-02-17 RX ORDER — HYDROMORPHONE HYDROCHLORIDE 2 MG/ML
1 INJECTION INTRAMUSCULAR; INTRAVENOUS; SUBCUTANEOUS
Refills: 0 | Status: DISCONTINUED | OUTPATIENT
Start: 2023-02-17 | End: 2023-02-17

## 2023-02-17 RX ADMIN — DORZOLAMIDE HYDROCHLORIDE TIMOLOL MALEATE 1 DROP(S): 20; 5 SOLUTION/ DROPS OPHTHALMIC at 05:03

## 2023-02-17 RX ADMIN — INSULIN GLARGINE 4 UNIT(S): 100 INJECTION, SOLUTION SUBCUTANEOUS at 21:45

## 2023-02-17 RX ADMIN — Medication 975 MILLIGRAM(S): at 13:14

## 2023-02-17 RX ADMIN — AMPICILLIN SODIUM AND SULBACTAM SODIUM 200 GRAM(S): 250; 125 INJECTION, POWDER, FOR SUSPENSION INTRAMUSCULAR; INTRAVENOUS at 01:45

## 2023-02-17 RX ADMIN — Medication 81 MILLIGRAM(S): at 13:15

## 2023-02-17 RX ADMIN — DORZOLAMIDE HYDROCHLORIDE TIMOLOL MALEATE 1 DROP(S): 20; 5 SOLUTION/ DROPS OPHTHALMIC at 19:27

## 2023-02-17 RX ADMIN — Medication 975 MILLIGRAM(S): at 05:45

## 2023-02-17 RX ADMIN — Medication 975 MILLIGRAM(S): at 05:01

## 2023-02-17 RX ADMIN — Medication 2: at 05:57

## 2023-02-17 RX ADMIN — Medication 2: at 12:35

## 2023-02-17 RX ADMIN — ERYTHROPOIETIN 10000 UNIT(S): 10000 INJECTION, SOLUTION INTRAVENOUS; SUBCUTANEOUS at 08:38

## 2023-02-17 RX ADMIN — Medication 975 MILLIGRAM(S): at 23:38

## 2023-02-17 RX ADMIN — Medication 2: at 18:20

## 2023-02-17 RX ADMIN — Medication 975 MILLIGRAM(S): at 22:38

## 2023-02-17 RX ADMIN — MIDODRINE HYDROCHLORIDE 10 MILLIGRAM(S): 2.5 TABLET ORAL at 21:45

## 2023-02-17 NOTE — PROGRESS NOTE ADULT - NUTRITIONAL ASSESSMENT
MEDICATIONS  (STANDING):  acetaminophen     Tablet .. 975 milliGRAM(s) Oral every 8 hours  ampicillin/sulbactam  IVPB 3 Gram(s) IV Intermittent every 24 hours  aspirin  chewable 81 milliGRAM(s) Oral daily  chlorhexidine 2% Cloths 1 Application(s) Topical <User Schedule>  dorzolamide 2%/timolol 0.5% Ophthalmic Solution 1 Drop(s) Both EYES two times a day  epoetin deidre-epbx (RETACRIT) Injectable 91154 Unit(s) IV Push <User Schedule>  insulin lispro (ADMELOG) corrective regimen sliding scale   SubCutaneous at bedtime  insulin lispro (ADMELOG) corrective regimen sliding scale   SubCutaneous three times a day before meals  midodrine. 10 milliGRAM(s) Oral three times a day  multivitamin 1 Tablet(s) Oral daily

## 2023-02-17 NOTE — PROGRESS NOTE ADULT - SUBJECTIVE AND OBJECTIVE BOX
Patient is a 47y Male whom presented to the hospital with esrd on hd     PAST MEDICAL & SURGICAL HISTORY:      MEDICATIONS  (STANDING):  dextrose 5%. 1000 milliLiter(s) (100 mL/Hr) IV Continuous <Continuous>  dextrose 5%. 1000 milliLiter(s) (50 mL/Hr) IV Continuous <Continuous>  dextrose 50% Injectable 25 Gram(s) IV Push once  dextrose 50% Injectable 25 Gram(s) IV Push once  dextrose 50% Injectable 12.5 Gram(s) IV Push once  glucagon  Injectable 1 milliGRAM(s) IntraMuscular once  insulin lispro (ADMELOG) corrective regimen sliding scale   SubCutaneous every 6 hours  pantoprazole    Tablet 40 milliGRAM(s) Oral daily  polyethylene glycol 3350 17 Gram(s) Oral daily  senna 2 Tablet(s) Oral at bedtime      Allergies    No Known Allergies    Intolerances        SOCIAL HISTORY:  Denies ETOh,Smoking,     FAMILY HISTORY:      REVIEW OF SYSTEMS:    CONSTITUTIONAL: No weakness, fevers or chills  NECK: No pain or stiffness  RESPIRATORY: No cough, wheezing, hemoptysis; No shortness of breath  CARDIOVASCULAR: No chest pain or palpitations  GASTROINTESTINAL: No abdominal or epigastric pain. No nausea, vomiting,                                          9.5    8.15  )-----------( 443      ( 17 Feb 2023 07:00 )             30.9       CBC Full  -  ( 17 Feb 2023 07:00 )  WBC Count : 8.15 K/uL  RBC Count : 3.44 M/uL  Hemoglobin : 9.5 g/dL  Hematocrit : 30.9 %  Platelet Count - Automated : 443 K/uL  Mean Cell Volume : 89.8 fL  Mean Cell Hemoglobin : 27.6 pg  Mean Cell Hemoglobin Concentration : 30.7 gm/dL  Auto Neutrophil # : x  Auto Lymphocyte # : x  Auto Monocyte # : x  Auto Eosinophil # : x  Auto Basophil # : x  Auto Neutrophil % : x  Auto Lymphocyte % : x  Auto Monocyte % : x  Auto Eosinophil % : x  Auto Basophil % : x      02-17    133<L>  |  94<L>  |  50<H>  ----------------------------<  193<H>  4.6   |  19<L>  |  8.72<H>    Ca    9.0      17 Feb 2023 07:00  Phos  6.6     02-17  Mg     2.30     02-17        CAPILLARY BLOOD GLUCOSE      POCT Blood Glucose.: 237 mg/dL (17 Feb 2023 17:34)  POCT Blood Glucose.: 215 mg/dL (17 Feb 2023 11:56)  POCT Blood Glucose.: 123 mg/dL (17 Feb 2023 08:57)  POCT Blood Glucose.: 229 mg/dL (17 Feb 2023 05:18)  POCT Blood Glucose.: 267 mg/dL (16 Feb 2023 21:23)      Vital Signs Last 24 Hrs  T(C): 37 (17 Feb 2023 18:56), Max: 37.4 (17 Feb 2023 17:41)  T(F): 98.6 (17 Feb 2023 18:56), Max: 99.3 (17 Feb 2023 17:41)  HR: 74 (17 Feb 2023 18:56) (61 - 74)  BP: 115/52 (17 Feb 2023 18:56) (110/57 - 146/56)  BP(mean): 69 (17 Feb 2023 18:56) (69 - 69)  RR: 16 (17 Feb 2023 18:56) (16 - 18)  SpO2: 100% (17 Feb 2023 18:56) (100% - 100%)    Parameters below as of 17 Feb 2023 18:56  Patient On (Oxygen Delivery Method): room air            PT/INR - ( 17 Feb 2023 07:00 )   PT: 13.3 sec;   INR: 1.14 ratio         PTT - ( 17 Feb 2023 07:00 )  PTT:28.1 sec                                             PHYSICAL EXAM:    Constitutional: NAD  HEENT: conjunctive   clear   Neck:  No JVD  Respiratory: CTAB  Cardiovascular: S1 and S2  Gastrointestinal: BS+, soft, NT/ND   right hand dressed. bilateral BKA   right arm AVF nontender

## 2023-02-17 NOTE — ASU PREOP CHECKLIST - SPO2 (%)
100 Price (Do Not Change): 0.00 Detail Level: Simple Instructions: This plan will send the code FBSD to the PM system.  DO NOT or CHANGE the price.

## 2023-02-17 NOTE — PROGRESS NOTE ADULT - PROBLEM SELECTOR PLAN 5
VTE ppx: HSQ    Dispo: pending ortho procedure, further reassessment VTE ppx: HSQ (on hold for procedure)    Dispo: pending ortho procedure, further reassessment

## 2023-02-17 NOTE — BRIEF OPERATIVE NOTE - ASSISTANT(S)
Jesús
Jesús
Matthew Ward
Celia Cleary, Kwasi Dee, Angela Jenkins
Jesús
Sasha
Jesús
Matthew Ward
Jesús

## 2023-02-17 NOTE — PROGRESS NOTE ADULT - PROBLEM SELECTOR PLAN 3
DM2 on long term insulin therapy, c/b diabetic retinopathy  - c/w Lantus and sliding scale coverage, Lantus dose was previously decreased 5U to 4U in anticipation of NPO status for procedure  - continue to monitor FS and adjust regimen as needed (can likely increase Lantus dose once done w/ procedure and taking diet again)  - A1C 7.1 in 1/10  - consider checking fasting lipid profile, given diabetes and hx of PVD, patient may benefit from statin therapy  - ophthalmology recs appreciated, severe proliferative diabetic retinopathy, c/w cosopt, otpt ophthalmology followup

## 2023-02-17 NOTE — PACU DISCHARGE NOTE - NSPTMEETSDISCHCRITERIADT_GEN_A_CORE
02-Feb-2023 21:24
07-Feb-2023 20:31
10-Sudeep-2023 21:24
14-Jan-2023 16:22
17-Feb-2023 21:56
13-Jan-2023 17:05
25-Jan-2023 13:18

## 2023-02-17 NOTE — CHART NOTE - NSCHARTNOTESELECT_GEN_ALL_CORE
Event Note
Infectious Disease/Off Service Note
Infectious Disease/Off Service Note
Nutrition Services
POSTOP CHECK/Event Note
Post op check
Postop Check/Event Note
Postop Check/Event Note
post op check/Event Note
Follow-Up/Nutrition Services
Follow-Up/Nutrition Services
Ortho/Event Note
Postop Check/Event Note
pre op

## 2023-02-17 NOTE — PROGRESS NOTE ADULT - SUBJECTIVE AND OBJECTIVE BOX
[facility] Medicine  Pager [phone]    Patient is a 47y old  Male who presents with a chief complaint of esrd on hd (16 Feb 2023 19:35)      INTERVAL HPI/OVERNIGHT EVENTS:    MEDICATIONS  (STANDING):  acetaminophen     Tablet .. 975 milliGRAM(s) Oral every 8 hours  ampicillin/sulbactam  IVPB 3 Gram(s) IV Intermittent every 24 hours  aspirin  chewable 81 milliGRAM(s) Oral daily  chlorhexidine 2% Cloths 1 Application(s) Topical <User Schedule>  dextrose 5%. 1000 milliLiter(s) (50 mL/Hr) IV Continuous <Continuous>  dextrose 5%. 1000 milliLiter(s) (100 mL/Hr) IV Continuous <Continuous>  dextrose 50% Injectable 25 Gram(s) IV Push once  dextrose 50% Injectable 12.5 Gram(s) IV Push once  dextrose 50% Injectable 25 Gram(s) IV Push once  dorzolamide 2%/timolol 0.5% Ophthalmic Solution 1 Drop(s) Both EYES two times a day  epoetin deidre-epbx (RETACRIT) Injectable 82478 Unit(s) IV Push <User Schedule>  glucagon  Injectable 1 milliGRAM(s) IntraMuscular once  insulin glargine Injectable (LANTUS) 4 Unit(s) SubCutaneous at bedtime  insulin lispro (ADMELOG) corrective regimen sliding scale   SubCutaneous three times a day before meals  insulin lispro (ADMELOG) corrective regimen sliding scale   SubCutaneous at bedtime  midodrine. 10 milliGRAM(s) Oral three times a day  multivitamin 1 Tablet(s) Oral daily    MEDICATIONS  (PRN):  bisacodyl Suppository 10 milliGRAM(s) Rectal daily PRN If no bowel movement  dextrose Oral Gel 15 Gram(s) Oral once PRN Blood Glucose LESS THAN 70 milliGRAM(s)/deciliter  lactulose Syrup 10 Gram(s) Oral daily PRN Constipation  oxyCODONE    IR 5 milliGRAM(s) Oral every 6 hours PRN Moderate Pain (4 - 6)      Allergies    No Known Drug Allergies  [place] (Rash)    Intolerances        REVIEW OF SYSTEMS:  Please see interval HPI:    Vital Signs Last 24 Hrs  T(C): 37.3 (17 Feb 2023 06:50), Max: 37.3 (17 Feb 2023 06:50)  T(F): 99.1 (17 Feb 2023 06:50), Max: 99.1 (17 Feb 2023 06:50)  HR: 66 (17 Feb 2023 06:50) (60 - 67)  BP: 125/50 (17 Feb 2023 06:50) (125/50 - 146/56)  BP(mean): --  RR: 18 (17 Feb 2023 06:50) (16 - 18)  SpO2: 100% (17 Feb 2023 05:59) (100% - 100%)    Parameters below as of 17 Feb 2023 05:59  Patient On (Oxygen Delivery Method): room air      I&O's Detail        PHYSICAL EXAM:  GENERAL:   HEAD:    EYES:   ENMT:   NECK:   NERVOUS SYSTEM:    CHEST/LUNG:   HEART:   ABDOMEN:   EXTREMITIES:    LYMPH:   SKIN:     LABS:                        9.5    8.15  )-----------( 443      ( 17 Feb 2023 07:00 )             30.9     17 Feb 2023 07:00    133    |  94     |  50     ----------------------------<  193    4.6     |  19     |  8.72     Ca    9.0        17 Feb 2023 07:00  Phos  6.6       17 Feb 2023 07:00  Mg     2.30      17 Feb 2023 07:00      PT/INR - ( 17 Feb 2023 07:00 )   PT: 13.3 sec;   INR: 1.14 ratio         PTT - ( 17 Feb 2023 07:00 )  PTT:28.1 sec  CAPILLARY BLOOD GLUCOSE      POCT Blood Glucose.: 123 mg/dL (17 Feb 2023 08:57)  POCT Blood Glucose.: 229 mg/dL (17 Feb 2023 05:18)  POCT Blood Glucose.: 267 mg/dL (16 Feb 2023 21:23)  POCT Blood Glucose.: 138 mg/dL (16 Feb 2023 17:48)    BLOOD CULTURE    RADIOLOGY & ADDITIONAL TESTS:    Imaging Personally Reviewed:  [ ] YES     Consultant(s) Notes Reviewed:      Care Discussed with Consultants/Other Providers: [facility] Medicine  Pager [phone]    Patient is a 47y old  Male who presents with a chief complaint of esrd on hd (16 Feb 2023 19:35)      INTERVAL HPI/OVERNIGHT EVENTS:  Procedure yesterday was unfortunately cancelled. Anticipated to occur later today. Patient able to have some breakfast this AM. Returned from dialysis, no acute complaints at this time, just hoping that procedure will happen today. Understands plan to be determined based on results of procedure.     MEDICATIONS  (STANDING):  acetaminophen     Tablet .. 975 milliGRAM(s) Oral every 8 hours  ampicillin/sulbactam  IVPB 3 Gram(s) IV Intermittent every 24 hours  aspirin  chewable 81 milliGRAM(s) Oral daily  chlorhexidine 2% Cloths 1 Application(s) Topical <User Schedule>  dextrose 5%. 1000 milliLiter(s) (50 mL/Hr) IV Continuous <Continuous>  dextrose 5%. 1000 milliLiter(s) (100 mL/Hr) IV Continuous <Continuous>  dextrose 50% Injectable 25 Gram(s) IV Push once  dextrose 50% Injectable 12.5 Gram(s) IV Push once  dextrose 50% Injectable 25 Gram(s) IV Push once  dorzolamide 2%/timolol 0.5% Ophthalmic Solution 1 Drop(s) Both EYES two times a day  epoetin deidre-epbx (RETACRIT) Injectable 72813 Unit(s) IV Push <User Schedule>  glucagon  Injectable 1 milliGRAM(s) IntraMuscular once  insulin glargine Injectable (LANTUS) 4 Unit(s) SubCutaneous at bedtime  insulin lispro (ADMELOG) corrective regimen sliding scale   SubCutaneous three times a day before meals  insulin lispro (ADMELOG) corrective regimen sliding scale   SubCutaneous at bedtime  midodrine. 10 milliGRAM(s) Oral three times a day  multivitamin 1 Tablet(s) Oral daily    MEDICATIONS  (PRN):  bisacodyl Suppository 10 milliGRAM(s) Rectal daily PRN If no bowel movement  dextrose Oral Gel 15 Gram(s) Oral once PRN Blood Glucose LESS THAN 70 milliGRAM(s)/deciliter  lactulose Syrup 10 Gram(s) Oral daily PRN Constipation  oxyCODONE    IR 5 milliGRAM(s) Oral every 6 hours PRN Moderate Pain (4 - 6)      Allergies    No Known Drug Allergies  [place] (Rash)    Intolerances      REVIEW OF SYSTEMS:  Please see interval HPI:    Vital Signs Last 24 Hrs  T(C): 37.3 (17 Feb 2023 06:50), Max: 37.3 (17 Feb 2023 06:50)  T(F): 99.1 (17 Feb 2023 06:50), Max: 99.1 (17 Feb 2023 06:50)  HR: 66 (17 Feb 2023 06:50) (60 - 67)  BP: 125/50 (17 Feb 2023 06:50) (125/50 - 146/56)  BP(mean): --  RR: 18 (17 Feb 2023 06:50) (16 - 18)  SpO2: 100% (17 Feb 2023 05:59) (100% - 100%)    Parameters below as of 17 Feb 2023 05:59  Patient On (Oxygen Delivery Method): room air    I&O's Detail    PHYSICAL EXAM:  GENERAL: NAD, lying in bed, talking on phone  HEAD: NC/AT  EYES: EOMI, clear sclera/conjunctiva  ENMT: MMM, hearing intact to voice  NECK: supple, no JVD  NERVOUS SYSTEM:  moving extremities, non-focal  CHEST/LUNG: Comfortable on RA, no respiratory distress, speaking in full sentences, no wheeze appreciated  EXTREMITIES:  R hand wound vac in place, s/p R finger amputation, s/p b/l BKA, prosthetics currently off, +RUE AVF    LABS:                        9.5    8.15  )-----------( 443      ( 17 Feb 2023 07:00 )             30.9     17 Feb 2023 07:00    133    |  94     |  50     ----------------------------<  193    4.6     |  19     |  8.72     Ca    9.0        17 Feb 2023 07:00  Phos  6.6       17 Feb 2023 07:00  Mg     2.30      17 Feb 2023 07:00      PT/INR - ( 17 Feb 2023 07:00 )   PT: 13.3 sec;   INR: 1.14 ratio         PTT - ( 17 Feb 2023 07:00 )  PTT:28.1 sec    CAPILLARY BLOOD GLUCOSE  POCT Blood Glucose.: 123 mg/dL (17 Feb 2023 08:57)  POCT Blood Glucose.: 229 mg/dL (17 Feb 2023 05:18)  POCT Blood Glucose.: 267 mg/dL (16 Feb 2023 21:23)  POCT Blood Glucose.: 138 mg/dL (16 Feb 2023 17:48)    BLOOD CULTURE    RADIOLOGY & ADDITIONAL TESTS:    Imaging Personally Reviewed:  [ ] YES     Consultant(s) Notes Reviewed:  Ortho    Care Discussed with Consultants/Other Providers: Ortho [id number] re: procedure planned for today (was cancelled yesterday)

## 2023-02-17 NOTE — PROGRESS NOTE ADULT - ASSESSMENT
48 yo M w/ ESRD on HD, PVD s/p b/l BKA, DM2 c/b diabetic retinopathy, initially presenting to [facility] w/ R finger swelling and pain, found to have steal syndrome, transferred to [facility] for further management, s/p revision of AVF w/ vascular surgery on 1/13, s/p R 3rd finger amputation and repeated I&D with ortho (1/14, 1/16, 1/19, 1/25, 2/2, 2/7), planned for return to OR on 2/17 for VAC exchange.

## 2023-02-17 NOTE — PACU DISCHARGE NOTE - AIRWAY PATENCY:
Satisfactory
Satisfactory
4 year old M dx with strep throat today at 5pm, prescribed amoxicillin did not yet  give today.  Gave motrin for fever 102F.  Vomiting total 8 times, no diarrhea, sore throat.     PMX none   PSX none  IUTD  Allergies none  PMD Ayala Aguila
Satisfactory
4 year old M dx with strep throat today at 5pm at PMD,  prescribed amoxicillin mother did not yet because patient started to vomit.  Vomited approx. 8 times since 6pm, with c/o generalized abdominal pain.  Fever to 102F, mother gave motrin, fever started earlier today.  Denies throat or abdominal pain now. Able to jump up and down.  Given zofran and rectal tylenol in triage, vomited 1/2 an hour afterward with po trial of 2 ounces of juice.  Denies HA, CP, abd. pain, diarrhea.      PMX none   PSX none  IUTD  Allergies none  PMD Ayala Aguila

## 2023-02-17 NOTE — PROGRESS NOTE ADULT - SUBJECTIVE AND OBJECTIVE BOX
ORTHO PROGRESS NOTE     Pt seen and examined at bedside, denies SOB, CP, Dizziness. N/V/D /HA.  No significant overnight events. Pain well controlled.    Vital Signs Last 24 Hrs  T(C): 36.7 (17 Feb 2023 01:47), Max: 37.1 (16 Feb 2023 06:25)  T(F): 98.1 (17 Feb 2023 01:47), Max: 98.7 (16 Feb 2023 06:25)  HR: 65 (17 Feb 2023 01:47) (60 - 67)  BP: 141/54 (17 Feb 2023 01:47) (127/62 - 153/76)  BP(mean): --  RR: 17 (17 Feb 2023 01:47) (16 - 18)  SpO2: 100% (17 Feb 2023 01:47) (100% - 100%)    Parameters below as of 17 Feb 2023 01:47  Patient On (Oxygen Delivery Method): room air      Physical exam:  Gen: NAD, resting comfortably  Resp: Nonlabored  R UE: vac in place with good seal, SILT  Able to spontaneously move fingers      Labs:  CBC Full  -  ( 16 Feb 2023 06:05 )  WBC Count : 9.01 K/uL  RBC Count : 3.86 M/uL  Hemoglobin : 10.6 g/dL  Hematocrit : 34.9 %  Platelet Count - Automated : 421 K/uL  Mean Cell Volume : 90.4 fL  Mean Cell Hemoglobin : 27.5 pg  Mean Cell Hemoglobin Concentration : 30.4 gm/dL  Auto Neutrophil # : x  Auto Lymphocyte # : x  Auto Monocyte # : x  Auto Eosinophil # : x  Auto Basophil # : x  Auto Neutrophil % : x  Auto Lymphocyte % : x  Auto Monocyte % : x  Auto Eosinophil % : x  Auto Basophil % : x      02-16    135  |  95<L>  |  36<H>  ----------------------------<  216<H>  4.5   |  21<L>  |  6.60<H>    Ca    9.3      16 Feb 2023 06:05        47y y/o Male 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 Repeat I+D on 1/14, 1/16, 1/19, 1/25, 2/7.    - PT- nonweight bearing right upper extremity  - Pain control  - DVT prophylaxis- ASA daily/ venodynes   - Appreciate ID recs  - Appreciate ophthalmology recs: outpatient follow-up  - Plan for OR vac exchange 2/17  - Hold DVT PPX  - Appreciate med clearance  - Dispo: TBD

## 2023-02-17 NOTE — PACU DISCHARGE NOTE - PAIN:
Controlled with current regime

## 2023-02-17 NOTE — BRIEF OPERATIVE NOTE - NSICDXBRIEFPROCEDURE_GEN_ALL_CORE_FT
PROCEDURES:  Incision and irrigation of finger 16-Jan-2023 16:54:36  Owen Espinosa  Incision and drainage, with wound VAC application 02-Feb-2023 16:13:47  Matthew Ward  
PROCEDURES:  Revision of arteriovenous dialysis fistula 13-Jan-2023 16:07:42  Kwasi Dee  
PROCEDURES:  Incision and drainage, with wound VAC application 02-Feb-2023 16:13:47  Matthew Ward  Application of composite skin graft 02-Feb-2023 16:14:10  Matthew Ward  
PROCEDURES:  Incision and drainage, with wound VAC application 02-Feb-2023 16:13:47  Matthew Ward  Application of composite skin graft 02-Feb-2023 16:14:10  Matthew Ward  
PROCEDURES:  Incision and irrigation of finger 16-Jan-2023 16:54:36  Owen Espinosa  
PROCEDURES:  Finger amputation 10-Sudeep-2023 19:52:07  Owen Espinosa  
PROCEDURES:  Finger amputation 10-Sudeep-2023 19:52:07  Owen Espinosa

## 2023-02-17 NOTE — PROGRESS NOTE ADULT - PROBLEM SELECTOR PLAN 1
Steal syndrome as complication of dialysis access, c/b R 3rd digit gangrene  - s/p RUE fistula repair on 1/13  - s/p R 3rd finger amputation and repeated I&D w/ ortho  - c/w pain management as per ortho  - c/w bowel regimen to prevent opioid induced constipation  - ID recs appreciated, wound cx polymicrobial (E. coli, strep, bacteroides)  - c/w IV unasyn as per ID, plan for 6 weeks of abx til 2/21, can convert to Augmentin if discharged, c/w IV Unasyn while inpatient  - procedure 2/16 had to be cancelled, plan to return to OR today 2/17 for VAC exchange, ?possible repeat I&D, currently NPO for procedure  - pre-op assessment: RCRI = 2 (ESRD, diabetes on insulin therapy), no signs/symptoms of ACS or decompensated heart failure, can RTOR without need for further cardiac testing, is optimized for procedure

## 2023-02-17 NOTE — BRIEF OPERATIVE NOTE - OPERATION/FINDINGS
R finger, wrist, volar forearm I&D, no gross purulence encountered
R hand and forearm I&D, partial closure, improved bleeding, no purulence
acell placement, wound vac change, I and D
R hand/forearm I&D
irrigation and debridement, application of Acell, wound vac exchange
Brachiobasilic AVF. Proximalization of arterial inflow. GSV harvest (right). Improvement of perfusion pressures of R hand with proximalization.
Interval R hand/forearm I&D and partial closure with Nylons
R 3rd finger amputation at metacarpal head, hand I&D, CTR
L hand/forearm wound vac exchange

## 2023-02-17 NOTE — PROGRESS NOTE ADULT - ASSESSMENT
Patient is a 46 y/o male PMH DM2, Bilateral BKA, ESRD (on HD MWF), last dialyzed yesterday transferred from [facility] emergently for evaluation of evaluation of right finger swelling/pain. Patient reports history of right finger pain after he had a fall one year ago. Patient had a wound on her second/middle finger who he was seeing a hand surgeon for outpatient but unable ultimately to continue seeing due to insurance issues. Patient reports his middle finger developed increased swelling and became black in color about two weeks ago. Denies fevers. Patient transferred to [facility] for further evaluation and emergent OR. Patient received broad spectrum Abx of Zosyn/vanco/clindamycin at [place]. Interpretor phone used for translation with patient. ID# [id number]        f/u  blood and urine cx,serial lactate levels,monitor vitals closley,ivfs hydration,monitor urine output and renal profile,iv abx as per id cons  ampicillin/sulbactam  IVPB 3 Gram(s) IV Intermittent every 24 hours    esrd on hd mwf   Excess fluids and waste products will be removed from your blood; your electrolytes will be balanced; your blood pressure will be controlled.    BP monitoring,continue current antihypertensive meds, low salt diet,followup with PMD in 1-2 weeks      ANEMIA PLAN:  Anemia of chronic disease:  Well controlled by epo  H and H subtherapeutic .  We will continue epo aiming for a HCT of 32-36 %.   We will monitor Iron stores, B12 and RBC folate .  epoetin deidre-epbx (RETACRIT) Injectable 03102 Unit(s) IV Push     Accuchecks monitoring and insulin sliding scale coverage, no concentrated sweets diet, serial labs and f/up with PMD, monitor HB A 1 C every 3-4 mnth  piperacillin/tazobactam IVPB.. 3.375 Gram(s) IV Intermittent every 12 hours

## 2023-02-17 NOTE — CHART NOTE - NSCHARTNOTEFT_GEN_A_CORE
Per chart---46 yo M w/ ESRD on HD, PVD s/p b/l BKA, DM2 c/b diabetic retinopathy, initially presenting to [facility] w/ R finger swelling and pain, found to have steal syndrome, transferred to [facility] for further management, s/p revision of AVF w/ vascular surgery on 1/13, s/p R 3rd finger amputation and repeated I&D with ortho (1/14, 1/16, 1/19, 1/25, 2/2, 2/7).     Nutrition follow up for LOS. Remains NPO, awaiting procedure, VAC placement. When eating---reports to completing ~75% of meals and 100% of one Nepro Carb Steady daily.  Denies chewing, swallowing difficulties or any nausea, vomiting, diarrhea, constipation. Remains with elevated Phos and POCT. Discussed importance of glycemic control. Pt familiar with foods high in Phos, and understands need for high pro intake.      DIET : Diet, NPO:   NPO for Procedure/Test     NPO Start Date: 17-Feb-2023,   NPO Start Time: 07:00  Except Medications  With Ice Chips/Sips of Water (02-16-23 @ 16:51)  Diet, Consistent Carbohydrate Renal w/Evening Snack:   Supplement Feeding Modality:  Oral  Nepro Cans or Servings Per Day:  1       Frequency:  Two Times a day (02-08-23 @ 15:34)    WEIGHT: Weight (kg): 65.8 (2/17) 64, 67  (2/16) 65.8 (2/13) 67,62 (2/11) 61 (2/10) 62      PERTINENT MEDICATIONS: MEDICATIONS  (STANDING):  acetaminophen     Tablet .. 975 milliGRAM(s) Oral every 8 hours  ampicillin/sulbactam  IVPB 3 Gram(s) IV Intermittent every 24 hours  aspirin  chewable 81 milliGRAM(s) Oral daily  chlorhexidine 2% Cloths 1 Application(s) Topical <User Schedule>  dextrose 5%. 1000 milliLiter(s) (50 mL/Hr) IV Continuous <Continuous>  dextrose 5%. 1000 milliLiter(s) (100 mL/Hr) IV Continuous <Continuous>  dextrose 50% Injectable 25 Gram(s) IV Push once  dextrose 50% Injectable 12.5 Gram(s) IV Push once  dextrose 50% Injectable 25 Gram(s) IV Push once  dorzolamide 2%/timolol 0.5% Ophthalmic Solution 1 Drop(s) Both EYES two times a day  epoetin deidre-epbx (RETACRIT) Injectable 55460 Unit(s) IV Push <User Schedule>  glucagon  Injectable 1 milliGRAM(s) IntraMuscular once  insulin glargine Injectable (LANTUS) 4 Unit(s) SubCutaneous at bedtime  insulin lispro (ADMELOG) corrective regimen sliding scale   SubCutaneous three times a day before meals  insulin lispro (ADMELOG) corrective regimen sliding scale   SubCutaneous at bedtime  midodrine. 10 milliGRAM(s) Oral three times a day  multivitamin 1 Tablet(s) Oral daily    LABS:  02-17 Na133 mmol/L<L> Glu 193 mg/dL<H> K+ 4.6 mmol/L Cr  8.72 mg/dL<H> BUN 50 mg/dL<H> 02-17 Phos 6.6 mg/dL<H>  CAPILLARY BLOOD GLUCOSE  POCT Blood Glucose.: 215 mg/dL (17 Feb 2023 11:56)  POCT Blood Glucose.: 123 mg/dL (17 Feb 2023 08:57)  POCT Blood Glucose.: 229 mg/dL (17 Feb 2023 05:18)  POCT Blood Glucose.: 267 mg/dL (16 Feb 2023 21:23)  POCT Blood Glucose.: 138 mg/dL (16 Feb 2023 17:48)    SKIN: No pressure injury     EDEMA: per nursing flow sheet (2/14) 2+ edema r hand    Nutrient Needs:  [X] No Change from previous Assessment.     Previous Nutrition Diagnosis:   Altered Nutrition Related Labs  Dx is Ongoing                                    ~~~~~RECOMMEND~~~~~  1.  Advance diet post procedure---CSTCHOSN, Renal with Evening Snack, Nepro Carb Steady 2x daily.   2.  Monitor PO intake, diet tolerance, skin integrity and pertinent labs.    3.  Please obtain current weight.  4.  Suggest Nephro Vandana in place of Multivitamin.    MICHELA Guillen RD, CDN, CDCES

## 2023-02-17 NOTE — CHART NOTE - NSCHARTNOTEFT_GEN_A_CORE
Post Operative Note  Patient: MELVI RODRIGUEZ 47y (1975) Male   MRN: [id number]  Location: [facility] [id number] A  Visit: 01-10-23 Inpatient  Date: 02-18-23 @ 01:49    Procedure: S/P R hand repeat I&D, acellular dermal matrix, wound vac change    Subjective:   Pain controlled, irritated that he is still in the hospital.     Objective:  Vitals: T(F): 97.5 (02-18-23 @ 01:41), Max: 99.3 (02-17-23 @ 17:41)  HR: 70 (02-18-23 @ 01:41)  BP: 131/57 (02-18-23 @ 01:41) (97/81 - 146/56)  RR: 16 (02-18-23 @ 01:41)  SpO2: 100% (02-18-23 @ 01:41)  Vent Settings:     In:   02-17-23 @ 07:01  -  02-18-23 @ 01:50  --------------------------------------------------------  IN: 460 mL      IV Fluids: dextrose 5%. 1000 milliLiter(s) (100 mL/Hr) IV Continuous <Continuous>  dextrose 5%. 1000 milliLiter(s) (50 mL/Hr) IV Continuous <Continuous>  multivitamin 1 Tablet(s) Oral daily      Out:   02-17-23 @ 07:01  -  02-18-23 @ 01:50  --------------------------------------------------------  OUT: 1900 mL      EBL:     Voided Urine:   02-17-23 @ 07:01  -  02-18-23 @ 01:50  --------------------------------------------------------  OUT: 1900 mL      [place] Catheter: no   Vac: yes      Physical Examination:  General: NAD, resting comfortably in bed  Respiratory: Nonlabored respirations, normal CW expansion.  R UE: vac in place with good seal, SILT  Able to spontaneously move fingers      Imaging:  No post-op imaging studies    Assessment:  47yMale patient S/P R hand repeat I&D, acellular dermal matrix, wound vac change    Plan:  - IV Abx: continue Unasyn  - Pain control PRN  - Labs in AM  - Activity: HAFSA HOOVER, ROM as able    Date/Time: 02-18-23 @ 01:50

## 2023-02-18 LAB
ANION GAP SERPL CALC-SCNC: 19 MMOL/L — HIGH (ref 7–14)
BUN SERPL-MCNC: 26 MG/DL — HIGH (ref 7–23)
CALCIUM SERPL-MCNC: 9.1 MG/DL — SIGNIFICANT CHANGE UP (ref 8.4–10.5)
CHLORIDE SERPL-SCNC: 96 MMOL/L — LOW (ref 98–107)
CO2 SERPL-SCNC: 22 MMOL/L — SIGNIFICANT CHANGE UP (ref 22–31)
CREAT SERPL-MCNC: 6.25 MG/DL — HIGH (ref 0.5–1.3)
EGFR: 10 ML/MIN/1.73M2 — LOW
GLUCOSE BLDC GLUCOMTR-MCNC: 144 MG/DL — HIGH (ref 70–99)
GLUCOSE BLDC GLUCOMTR-MCNC: 167 MG/DL — HIGH (ref 70–99)
GLUCOSE BLDC GLUCOMTR-MCNC: 173 MG/DL — HIGH (ref 70–99)
GLUCOSE BLDC GLUCOMTR-MCNC: 257 MG/DL — HIGH (ref 70–99)
GLUCOSE SERPL-MCNC: 189 MG/DL — HIGH (ref 70–99)
HCT VFR BLD CALC: 34 % — LOW (ref 39–50)
HGB BLD-MCNC: 10.3 G/DL — LOW (ref 13–17)
MCHC RBC-ENTMCNC: 27.4 PG — SIGNIFICANT CHANGE UP (ref 27–34)
MCHC RBC-ENTMCNC: 30.3 GM/DL — LOW (ref 32–36)
MCV RBC AUTO: 90.4 FL — SIGNIFICANT CHANGE UP (ref 80–100)
NRBC # BLD: 0 /100 WBCS — SIGNIFICANT CHANGE UP (ref 0–0)
NRBC # FLD: 0 K/UL — SIGNIFICANT CHANGE UP (ref 0–0)
PLATELET # BLD AUTO: 453 K/UL — HIGH (ref 150–400)
POTASSIUM SERPL-MCNC: 3.7 MMOL/L — SIGNIFICANT CHANGE UP (ref 3.5–5.3)
POTASSIUM SERPL-SCNC: 3.7 MMOL/L — SIGNIFICANT CHANGE UP (ref 3.5–5.3)
RBC # BLD: 3.76 M/UL — LOW (ref 4.2–5.8)
RBC # FLD: 15.6 % — HIGH (ref 10.3–14.5)
SODIUM SERPL-SCNC: 137 MMOL/L — SIGNIFICANT CHANGE UP (ref 135–145)
WBC # BLD: 8.67 K/UL — SIGNIFICANT CHANGE UP (ref 3.8–10.5)
WBC # FLD AUTO: 8.67 K/UL — SIGNIFICANT CHANGE UP (ref 3.8–10.5)

## 2023-02-18 PROCEDURE — 99232 SBSQ HOSP IP/OBS MODERATE 35: CPT

## 2023-02-18 RX ORDER — HEPARIN SODIUM 5000 [USP'U]/ML
5000 INJECTION INTRAVENOUS; SUBCUTANEOUS EVERY 8 HOURS
Refills: 0 | Status: DISCONTINUED | OUTPATIENT
Start: 2023-02-18 | End: 2023-02-24

## 2023-02-18 RX ORDER — MIDODRINE HYDROCHLORIDE 2.5 MG/1
5 TABLET ORAL THREE TIMES A DAY
Refills: 0 | Status: DISCONTINUED | OUTPATIENT
Start: 2023-02-18 | End: 2023-02-19

## 2023-02-18 RX ADMIN — Medication 975 MILLIGRAM(S): at 15:38

## 2023-02-18 RX ADMIN — DORZOLAMIDE HYDROCHLORIDE TIMOLOL MALEATE 1 DROP(S): 20; 5 SOLUTION/ DROPS OPHTHALMIC at 19:13

## 2023-02-18 RX ADMIN — AMPICILLIN SODIUM AND SULBACTAM SODIUM 200 GRAM(S): 250; 125 INJECTION, POWDER, FOR SUSPENSION INTRAMUSCULAR; INTRAVENOUS at 01:45

## 2023-02-18 RX ADMIN — Medication 975 MILLIGRAM(S): at 22:19

## 2023-02-18 RX ADMIN — INSULIN GLARGINE 4 UNIT(S): 100 INJECTION, SOLUTION SUBCUTANEOUS at 21:20

## 2023-02-18 RX ADMIN — Medication 1: at 07:37

## 2023-02-18 RX ADMIN — HEPARIN SODIUM 5000 UNIT(S): 5000 INJECTION INTRAVENOUS; SUBCUTANEOUS at 21:21

## 2023-02-18 RX ADMIN — DORZOLAMIDE HYDROCHLORIDE TIMOLOL MALEATE 1 DROP(S): 20; 5 SOLUTION/ DROPS OPHTHALMIC at 06:28

## 2023-02-18 RX ADMIN — Medication 3: at 16:58

## 2023-02-18 RX ADMIN — Medication 975 MILLIGRAM(S): at 06:28

## 2023-02-18 RX ADMIN — Medication 975 MILLIGRAM(S): at 14:38

## 2023-02-18 RX ADMIN — CHLORHEXIDINE GLUCONATE 1 APPLICATION(S): 213 SOLUTION TOPICAL at 10:05

## 2023-02-18 RX ADMIN — Medication 975 MILLIGRAM(S): at 07:28

## 2023-02-18 RX ADMIN — Medication 81 MILLIGRAM(S): at 11:34

## 2023-02-18 RX ADMIN — Medication 975 MILLIGRAM(S): at 21:20

## 2023-02-18 RX ADMIN — HEPARIN SODIUM 5000 UNIT(S): 5000 INJECTION INTRAVENOUS; SUBCUTANEOUS at 14:37

## 2023-02-18 NOTE — PROGRESS NOTE ADULT - PROBLEM SELECTOR PLAN 4
ESRD on HD  - c/w HD as per renal  - anemia of renal disease, c/w epo during HD as per renal  - renal restricted diet w/ nepro supplements

## 2023-02-18 NOTE — PROGRESS NOTE ADULT - ASSESSMENT
Patient is a 48 y/o male PMH DM2, Bilateral BKA, ESRD (on HD MWF), last dialyzed yesterday transferred from [facility] emergently for evaluation of evaluation of right finger swelling/pain. Patient reports history of right finger pain after he had a fall one year ago. Patient had a wound on her second/middle finger who he was seeing a hand surgeon for outpatient but unable ultimately to continue seeing due to insurance issues. Patient reports his middle finger developed increased swelling and became black in color about two weeks ago. Denies fevers. Patient transferred to [facility] for further evaluation and emergent OR. Patient received broad spectrum Abx of Zosyn/vanco/clindamycin at [place]. Interpretor phone used for translation with patient. ID# [id number]        f/u  blood and urine cx,serial lactate levels,monitor vitals closley,ivfs hydration,monitor urine output and renal profile,iv abx as per id cons  ampicillin/sulbactam  IVPB 3 Gram(s) IV Intermittent every 24 hours    esrd on hd mwf   Excess fluids and waste products will be removed from your blood; your electrolytes will be balanced; your blood pressure will be controlled.    BP monitoring,continue current antihypertensive meds, low salt diet,followup with PMD in 1-2 weeks      ANEMIA PLAN:  Anemia of chronic disease:  Well controlled by epo  H and H subtherapeutic .  We will continue epo aiming for a HCT of 32-36 %.   We will monitor Iron stores, B12 and RBC folate .  epoetin deidre-epbx (RETACRIT) Injectable 79519 Unit(s) IV Push     Accuchecks monitoring and insulin sliding scale coverage, no concentrated sweets diet, serial labs and f/up with PMD, monitor HB A 1 C every 3-4 mnth  piperacillin/tazobactam IVPB.. 3.375 Gram(s) IV Intermittent every 12 hours

## 2023-02-18 NOTE — PROGRESS NOTE ADULT - PROBLEM SELECTOR PLAN 1
Steal syndrome as complication of dialysis access, c/b R 3rd digit gangrene  - s/p RUE fistula repair on 1/13  - s/p R 3rd finger amputation and repeated I&D w/ ortho  - c/w pain management as per ortho  - c/w bowel regimen to prevent opioid induced constipation  - ID recs appreciated, wound cx polymicrobial (E. coli, strep, bacteroides)  - c/w IV unasyn as per ID, plan for 6 weeks of abx til 2/21, can convert to Augmentin if discharged, c/w IV Unasyn while inpatient  - s/p I&D and VAC change on Feb 17

## 2023-02-18 NOTE — PROGRESS NOTE ADULT - SUBJECTIVE AND OBJECTIVE BOX
ORTHO PROGRESS NOTE     Pt seen and examined at bedside, denies SOB, CP, Dizziness. N/V/D /HA.  No significant overnight events. Pain well controlled. Would like to be discharged before his court date on 2/25    Vital Signs Last 24 Hrs  T(C): 36.8 (18 Feb 2023 09:19), Max: 37.4 (17 Feb 2023 17:41)  T(F): 98.2 (18 Feb 2023 09:19), Max: 99.3 (17 Feb 2023 17:41)  HR: 67 (18 Feb 2023 09:19) (61 - 74)  BP: 134/52 (18 Feb 2023 09:19) (97/81 - 154/53)  BP(mean): 95 (17 Feb 2023 22:00) (61 - 96)  RR: 18 (18 Feb 2023 09:19) (12 - 18)  SpO2: 100% (18 Feb 2023 09:19) (100% - 100%)    Parameters below as of 18 Feb 2023 09:19  Patient On (Oxygen Delivery Method): room air      Physical exam:  Gen: NAD, resting comfortably  Resp: Nonlabored  R UE: vac in place with good seal, SILT  Able to spontaneously move fingers        47y y/o Male 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 Repeat I+D on 1/14, 1/16, 1/19, 1/25, 2/7, 2/17    - PT- NWB right upper extremity  - Pain control  - DVT prophylaxis- ASA daily/SQH  - Appreciate ID recs: Unasyn in house, Augmentin 500mg QD on discharge until 2/21  - Appreciate ophthalmology recs: outpatient follow-up  - Appreciate med comangement  - Dispo: TBD

## 2023-02-18 NOTE — PROGRESS NOTE ADULT - SUBJECTIVE AND OBJECTIVE BOX
Patient is a 47y old  Male who presents with a chief complaint of esrd on hd (17 Feb 2023 20:13)      SUBJECTIVE / OVERNIGHT EVENTS:    No events overnight. This AM, patient without n/v/d/cp/sob.      MEDICATIONS  (STANDING):  acetaminophen     Tablet .. 975 milliGRAM(s) Oral every 8 hours  ampicillin/sulbactam  IVPB 3 Gram(s) IV Intermittent every 24 hours  aspirin  chewable 81 milliGRAM(s) Oral daily  chlorhexidine 2% Cloths 1 Application(s) Topical <User Schedule>  dextrose 5%. 1000 milliLiter(s) (50 mL/Hr) IV Continuous <Continuous>  dextrose 5%. 1000 milliLiter(s) (100 mL/Hr) IV Continuous <Continuous>  dextrose 50% Injectable 25 Gram(s) IV Push once  dextrose 50% Injectable 12.5 Gram(s) IV Push once  dextrose 50% Injectable 25 Gram(s) IV Push once  dorzolamide 2%/timolol 0.5% Ophthalmic Solution 1 Drop(s) Both EYES two times a day  epoetin deidre-epbx (RETACRIT) Injectable 62364 Unit(s) IV Push <User Schedule>  glucagon  Injectable 1 milliGRAM(s) IntraMuscular once  heparin   Injectable 5000 Unit(s) SubCutaneous every 8 hours  insulin glargine Injectable (LANTUS) 4 Unit(s) SubCutaneous at bedtime  insulin lispro (ADMELOG) corrective regimen sliding scale   SubCutaneous three times a day before meals  insulin lispro (ADMELOG) corrective regimen sliding scale   SubCutaneous at bedtime  midodrine. 10 milliGRAM(s) Oral three times a day  multivitamin 1 Tablet(s) Oral daily    MEDICATIONS  (PRN):  bisacodyl Suppository 10 milliGRAM(s) Rectal daily PRN If no bowel movement  dextrose Oral Gel 15 Gram(s) Oral once PRN Blood Glucose LESS THAN 70 milliGRAM(s)/deciliter  lactulose Syrup 10 Gram(s) Oral daily PRN Constipation  oxyCODONE    IR 5 milliGRAM(s) Oral every 6 hours PRN Moderate Pain (4 - 6)      PHYSICAL EXAM:  T(C): 36.8 (02-18-23 @ 09:19), Max: 37.4 (02-17-23 @ 17:41)  HR: 67 (02-18-23 @ 09:19) (61 - 74)  BP: 134/52 (02-18-23 @ 09:19) (97/81 - 154/53)  RR: 18 (02-18-23 @ 09:19) (12 - 18)  SpO2: 100% (02-18-23 @ 09:19) (100% - 100%)  I&O's Summary    17 Feb 2023 07:01  -  18 Feb 2023 07:00  --------------------------------------------------------  IN: 460 mL / OUT: 1910 mL / NET: -1450 mL      GENERAL: NAD, well-developed, seated in bed, eating breakfast  HEAD:  Atraumatic, Normocephalic, MMM  CHEST/LUNG: No use of accessory muscles, CTAB, breathing non-labored  COR: RR, no mrcg  ABD: Soft, ND/NT, +BS  PSYCH: AAOx3  NEUROLOGY: CN II-XII grossly intact, moving all extremities  SKIN: No rashes or lesions  EXT: wwp, no cce; s/p R finger amputation, s/p b/l BKA, prosthetics at bedside, +RUE AVF    LABS:  CAPILLARY BLOOD GLUCOSE  167 (18 Feb 2023 07:36)      POCT Blood Glucose.: 167 mg/dL (18 Feb 2023 07:30)  POCT Blood Glucose.: 126 mg/dL (17 Feb 2023 22:33)  POCT Blood Glucose.: 129 mg/dL (17 Feb 2023 21:42)  POCT Blood Glucose.: 148 mg/dL (17 Feb 2023 20:58)  POCT Blood Glucose.: 237 mg/dL (17 Feb 2023 17:34)  POCT Blood Glucose.: 215 mg/dL (17 Feb 2023 11:56)                          10.3   8.67  )-----------( 453      ( 18 Feb 2023 06:50 )             34.0     02-18    137  |  96<L>  |  26<H>  ----------------------------<  189<H>  3.7   |  22  |  6.25<H>    Ca    9.1      18 Feb 2023 06:50  Phos  6.6     02-17  Mg     2.30     02-17      PT/INR - ( 17 Feb 2023 07:00 )   PT: 13.3 sec;   INR: 1.14 ratio         PTT - ( 17 Feb 2023 07:00 )  PTT:28.1 sec            RADIOLOGY & ADDITIONAL TESTS:    Telemetry Personally Reviewed -     Imaging Personally Reviewed -     Imaging Reviewed -     Consultant(s) Notes Reviewed -       Care Discussed with Consultants/Other Providers -

## 2023-02-18 NOTE — PROGRESS NOTE ADULT - ASSESSMENT
46 yo M w/ ESRD on HD, PVD s/p b/l BKA, DM2 c/b diabetic retinopathy, initially presenting to [facility] w/ R finger swelling and pain, found to have steal syndrome, transferred to [facility] for further management, s/p revision of AVF w/ vascular surgery on 1/13, s/p R 3rd finger amputation and repeated I&D with ortho (1/14, 1/16, 1/19, 1/25, 2/2, 2/7), s/p I&D and VAC change on 2/17.

## 2023-02-18 NOTE — PROGRESS NOTE ADULT - NUTRITIONAL ASSESSMENT
MEDICATIONS  (STANDING):  acetaminophen     Tablet .. 975 milliGRAM(s) Oral every 8 hours  ampicillin/sulbactam  IVPB 3 Gram(s) IV Intermittent every 24 hours  aspirin  chewable 81 milliGRAM(s) Oral daily  chlorhexidine 2% Cloths 1 Application(s) Topical <User Schedule>  dorzolamide 2%/timolol 0.5% Ophthalmic Solution 1 Drop(s) Both EYES two times a day  epoetin deidre-epbx (RETACRIT) Injectable 29941 Unit(s) IV Push <User Schedule>  insulin lispro (ADMELOG) corrective regimen sliding scale   SubCutaneous at bedtime  insulin lispro (ADMELOG) corrective regimen sliding scale   SubCutaneous three times a day before meals  midodrine. 10 milliGRAM(s) Oral three times a day  multivitamin 1 Tablet(s) Oral daily

## 2023-02-18 NOTE — PROGRESS NOTE ADULT - SUBJECTIVE AND OBJECTIVE BOX
Patient is a 47y Male whom presented to the hospital with esrd on hd     PAST MEDICAL & SURGICAL HISTORY:      MEDICATIONS  (STANDING):  dextrose 5%. 1000 milliLiter(s) (100 mL/Hr) IV Continuous <Continuous>  dextrose 5%. 1000 milliLiter(s) (50 mL/Hr) IV Continuous <Continuous>  dextrose 50% Injectable 25 Gram(s) IV Push once  dextrose 50% Injectable 25 Gram(s) IV Push once  dextrose 50% Injectable 12.5 Gram(s) IV Push once  glucagon  Injectable 1 milliGRAM(s) IntraMuscular once  insulin lispro (ADMELOG) corrective regimen sliding scale   SubCutaneous every 6 hours  pantoprazole    Tablet 40 milliGRAM(s) Oral daily  polyethylene glycol 3350 17 Gram(s) Oral daily  senna 2 Tablet(s) Oral at bedtime      Allergies    No Known Allergies    Intolerances        SOCIAL HISTORY:  Denies ETOh,Smoking,     FAMILY HISTORY:      REVIEW OF SYSTEMS:    CONSTITUTIONAL: No weakness, fevers or chills  NECK: No pain or stiffness  RESPIRATORY: No cough, wheezing, hemoptysis; No shortness of breath  CARDIOVASCULAR: No chest pain or palpitations  GASTROINTESTINAL: No abdominal or epigastric pain. No nausea, vomiting,                                                   10.3   8.67  )-----------( 453      ( 18 Feb 2023 06:50 )             34.0       CBC Full  -  ( 18 Feb 2023 06:50 )  WBC Count : 8.67 K/uL  RBC Count : 3.76 M/uL  Hemoglobin : 10.3 g/dL  Hematocrit : 34.0 %  Platelet Count - Automated : 453 K/uL  Mean Cell Volume : 90.4 fL  Mean Cell Hemoglobin : 27.4 pg  Mean Cell Hemoglobin Concentration : 30.3 gm/dL  Auto Neutrophil # : x  Auto Lymphocyte # : x  Auto Monocyte # : x  Auto Eosinophil # : x  Auto Basophil # : x  Auto Neutrophil % : x  Auto Lymphocyte % : x  Auto Monocyte % : x  Auto Eosinophil % : x  Auto Basophil % : x      02-18    137  |  96<L>  |  26<H>  ----------------------------<  189<H>  3.7   |  22  |  6.25<H>    Ca    9.1      18 Feb 2023 06:50  Phos  6.6     02-17  Mg     2.30     02-17        CAPILLARY BLOOD GLUCOSE  167 (18 Feb 2023 07:36)      POCT Blood Glucose.: 257 mg/dL (18 Feb 2023 16:31)  POCT Blood Glucose.: 144 mg/dL (18 Feb 2023 11:27)  POCT Blood Glucose.: 167 mg/dL (18 Feb 2023 07:30)  POCT Blood Glucose.: 126 mg/dL (17 Feb 2023 22:33)  POCT Blood Glucose.: 129 mg/dL (17 Feb 2023 21:42)  POCT Blood Glucose.: 148 mg/dL (17 Feb 2023 20:58)      Vital Signs Last 24 Hrs  T(C): 36.4 (18 Feb 2023 17:48), Max: 37.1 (17 Feb 2023 20:55)  T(F): 97.5 (18 Feb 2023 17:48), Max: 98.8 (17 Feb 2023 20:55)  HR: 72 (18 Feb 2023 17:48) (66 - 74)  BP: 147/74 (18 Feb 2023 17:48) (97/81 - 154/53)  BP(mean): 95 (17 Feb 2023 22:00) (61 - 96)  RR: 17 (18 Feb 2023 17:48) (12 - 18)  SpO2: 100% (18 Feb 2023 17:48) (100% - 100%)    Parameters below as of 18 Feb 2023 17:48  Patient On (Oxygen Delivery Method): room air            PT/INR - ( 17 Feb 2023 07:00 )   PT: 13.3 sec;   INR: 1.14 ratio         PTT - ( 17 Feb 2023 07:00 )  PTT:28.1 sec                                          PHYSICAL EXAM:    Constitutional: NAD  HEENT: conjunctive   clear   Neck:  No JVD  Respiratory: CTAB  Cardiovascular: S1 and S2  Gastrointestinal: BS+, soft, NT/ND   right hand dressed. bilateral BKA   right arm AVF nontender

## 2023-02-18 NOTE — PHYSICAL EXAM
[4 x 4] : 4 x 4  [Abdominal Pad] : Abdominal Pad [Normal Breath Sounds] : Normal breath sounds [0] : left 0 [1+] : left 1+ [Varicose Veins Of Lower Extremities] : bilaterally [Purpura] : purpura [Ankle Swelling On The Right] : mild [Petechiae] : petechiae [Skin Ulcer] : ulcer [Skin Induration] : induration [Alert] : alert [Oriented to Person] : oriented to person [Oriented to Place] : oriented to place [Calm] : calm [de-identified] : comfortable  [de-identified] : WNL [de-identified] : hammer toe [de-identified] : DFU 3 plantar left heel , clean granular , post debridement  [de-identified] : Diabetic neuropathy  [de-identified] : post debridement measurements: 2.0 x 1.2 x 0.2-0.4 [de-identified] : Zeina  [TWNoteComboBox7] : Isabela [de-identified] : Other [de-identified] : 3x Weekly [de-identified] : Primary Dressing [FreeTextEntry1] : Posterior Heel  [FreeTextEntry2] : 1.8 [FreeTextEntry3] : 1.0 [de-identified] : Cleansed with Normal saline\par  [FreeTextEntry4] : 0.1-0.3 [de-identified] : callus [TWNoteComboBox1] : Left [TWNoteComboBox5] : No [TWNoteComboBox4] : Small [TWNoteComboBox6] : Other [de-identified] : No [de-identified] : other [de-identified] : None [de-identified] : None [de-identified] : 100% [de-identified] : No [de-identified] : Ace wraps 4 = No assist / stand by assistance

## 2023-02-19 LAB
ANION GAP SERPL CALC-SCNC: 18 MMOL/L — HIGH (ref 7–14)
BUN SERPL-MCNC: 34 MG/DL — HIGH (ref 7–23)
CALCIUM SERPL-MCNC: 9 MG/DL — SIGNIFICANT CHANGE UP (ref 8.4–10.5)
CHLORIDE SERPL-SCNC: 93 MMOL/L — LOW (ref 98–107)
CO2 SERPL-SCNC: 23 MMOL/L — SIGNIFICANT CHANGE UP (ref 22–31)
CREAT SERPL-MCNC: 8.29 MG/DL — HIGH (ref 0.5–1.3)
EGFR: 7 ML/MIN/1.73M2 — LOW
GLUCOSE BLDC GLUCOMTR-MCNC: 122 MG/DL — HIGH (ref 70–99)
GLUCOSE BLDC GLUCOMTR-MCNC: 190 MG/DL — HIGH (ref 70–99)
GLUCOSE BLDC GLUCOMTR-MCNC: 191 MG/DL — HIGH (ref 70–99)
GLUCOSE BLDC GLUCOMTR-MCNC: 211 MG/DL — HIGH (ref 70–99)
GLUCOSE SERPL-MCNC: 210 MG/DL — HIGH (ref 70–99)
HCT VFR BLD CALC: 34.2 % — LOW (ref 39–50)
HGB BLD-MCNC: 10.3 G/DL — LOW (ref 13–17)
MCHC RBC-ENTMCNC: 27.5 PG — SIGNIFICANT CHANGE UP (ref 27–34)
MCHC RBC-ENTMCNC: 30.1 GM/DL — LOW (ref 32–36)
MCV RBC AUTO: 91.2 FL — SIGNIFICANT CHANGE UP (ref 80–100)
NRBC # BLD: 0 /100 WBCS — SIGNIFICANT CHANGE UP (ref 0–0)
NRBC # FLD: 0 K/UL — SIGNIFICANT CHANGE UP (ref 0–0)
PLATELET # BLD AUTO: 457 K/UL — HIGH (ref 150–400)
POTASSIUM SERPL-MCNC: 4 MMOL/L — SIGNIFICANT CHANGE UP (ref 3.5–5.3)
POTASSIUM SERPL-SCNC: 4 MMOL/L — SIGNIFICANT CHANGE UP (ref 3.5–5.3)
RBC # BLD: 3.75 M/UL — LOW (ref 4.2–5.8)
RBC # FLD: 15.4 % — HIGH (ref 10.3–14.5)
SODIUM SERPL-SCNC: 134 MMOL/L — LOW (ref 135–145)
WBC # BLD: 7.98 K/UL — SIGNIFICANT CHANGE UP (ref 3.8–10.5)
WBC # FLD AUTO: 7.98 K/UL — SIGNIFICANT CHANGE UP (ref 3.8–10.5)

## 2023-02-19 PROCEDURE — 99232 SBSQ HOSP IP/OBS MODERATE 35: CPT

## 2023-02-19 RX ORDER — OXYCODONE HYDROCHLORIDE 5 MG/1
5 TABLET ORAL EVERY 6 HOURS
Refills: 0 | Status: DISCONTINUED | OUTPATIENT
Start: 2023-02-19 | End: 2023-02-24

## 2023-02-19 RX ADMIN — Medication 1: at 07:53

## 2023-02-19 RX ADMIN — AMPICILLIN SODIUM AND SULBACTAM SODIUM 200 GRAM(S): 250; 125 INJECTION, POWDER, FOR SUSPENSION INTRAMUSCULAR; INTRAVENOUS at 02:28

## 2023-02-19 RX ADMIN — Medication 975 MILLIGRAM(S): at 15:10

## 2023-02-19 RX ADMIN — DORZOLAMIDE HYDROCHLORIDE TIMOLOL MALEATE 1 DROP(S): 20; 5 SOLUTION/ DROPS OPHTHALMIC at 19:15

## 2023-02-19 RX ADMIN — Medication 975 MILLIGRAM(S): at 06:12

## 2023-02-19 RX ADMIN — Medication 975 MILLIGRAM(S): at 05:57

## 2023-02-19 RX ADMIN — CHLORHEXIDINE GLUCONATE 1 APPLICATION(S): 213 SOLUTION TOPICAL at 05:58

## 2023-02-19 RX ADMIN — Medication 975 MILLIGRAM(S): at 22:00

## 2023-02-19 RX ADMIN — INSULIN GLARGINE 4 UNIT(S): 100 INJECTION, SOLUTION SUBCUTANEOUS at 21:32

## 2023-02-19 RX ADMIN — HEPARIN SODIUM 5000 UNIT(S): 5000 INJECTION INTRAVENOUS; SUBCUTANEOUS at 14:14

## 2023-02-19 RX ADMIN — DORZOLAMIDE HYDROCHLORIDE TIMOLOL MALEATE 1 DROP(S): 20; 5 SOLUTION/ DROPS OPHTHALMIC at 05:57

## 2023-02-19 RX ADMIN — Medication 81 MILLIGRAM(S): at 11:35

## 2023-02-19 RX ADMIN — HEPARIN SODIUM 5000 UNIT(S): 5000 INJECTION INTRAVENOUS; SUBCUTANEOUS at 21:34

## 2023-02-19 RX ADMIN — Medication 1: at 11:35

## 2023-02-19 RX ADMIN — Medication 975 MILLIGRAM(S): at 21:34

## 2023-02-19 RX ADMIN — Medication 975 MILLIGRAM(S): at 14:14

## 2023-02-19 RX ADMIN — HEPARIN SODIUM 5000 UNIT(S): 5000 INJECTION INTRAVENOUS; SUBCUTANEOUS at 05:57

## 2023-02-19 NOTE — PROGRESS NOTE ADULT - SUBJECTIVE AND OBJECTIVE BOX
Patient is a 47y old  Male who presents with a chief complaint of esrd on hd (18 Feb 2023 18:45)    SUBJECTIVE / OVERNIGHT EVENTS:    No events overnight. This AM, patient without n/v/d/cp/sob.      MEDICATIONS  (STANDING):  acetaminophen     Tablet .. 975 milliGRAM(s) Oral every 8 hours  ampicillin/sulbactam  IVPB 3 Gram(s) IV Intermittent every 24 hours  aspirin  chewable 81 milliGRAM(s) Oral daily  chlorhexidine 2% Cloths 1 Application(s) Topical <User Schedule>  dextrose 5%. 1000 milliLiter(s) (100 mL/Hr) IV Continuous <Continuous>  dextrose 5%. 1000 milliLiter(s) (50 mL/Hr) IV Continuous <Continuous>  dextrose 50% Injectable 25 Gram(s) IV Push once  dextrose 50% Injectable 12.5 Gram(s) IV Push once  dextrose 50% Injectable 25 Gram(s) IV Push once  dorzolamide 2%/timolol 0.5% Ophthalmic Solution 1 Drop(s) Both EYES two times a day  epoetin deidre-epbx (RETACRIT) Injectable 34620 Unit(s) IV Push <User Schedule>  glucagon  Injectable 1 milliGRAM(s) IntraMuscular once  heparin   Injectable 5000 Unit(s) SubCutaneous every 8 hours  insulin glargine Injectable (LANTUS) 4 Unit(s) SubCutaneous at bedtime  insulin lispro (ADMELOG) corrective regimen sliding scale   SubCutaneous three times a day before meals  insulin lispro (ADMELOG) corrective regimen sliding scale   SubCutaneous at bedtime  midodrine. 5 milliGRAM(s) Oral three times a day  multivitamin 1 Tablet(s) Oral daily    MEDICATIONS  (PRN):  bisacodyl Suppository 10 milliGRAM(s) Rectal daily PRN If no bowel movement  dextrose Oral Gel 15 Gram(s) Oral once PRN Blood Glucose LESS THAN 70 milliGRAM(s)/deciliter  lactulose Syrup 10 Gram(s) Oral daily PRN Constipation  oxyCODONE    IR 5 milliGRAM(s) Oral every 6 hours PRN Moderate Pain (4 - 6)      PHYSICAL EXAM:  T(C): 36.7 (02-19-23 @ 06:07), Max: 36.8 (02-19-23 @ 01:42)  HR: 75 (02-19-23 @ 06:07) (65 - 75)  BP: 147/62 (02-19-23 @ 06:07) (139/51 - 155/68)  RR: 18 (02-19-23 @ 06:07) (17 - 18)  SpO2: 100% (02-19-23 @ 06:07) (98% - 100%)  I&O's Summary    GENERAL: NAD, well-developed  HEAD:  Atraumatic, Normocephalic, MMM  CHEST/LUNG: No use of accessory muscles, CTAB, breathing non-labored  COR: RR, no mrcg  ABD: Soft, ND/NT, +BS  PSYCH: AAOx3  NEUROLOGY: CN II-XII grossly intact, moving all extremities  SKIN: No rashes or lesions  EXT: wwp, no cce; s/p R finger amputation, s/p b/l BKA, prosthetics at bedside, +RUE AVF    LABS:  CAPILLARY BLOOD GLUCOSE      POCT Blood Glucose.: 191 mg/dL (19 Feb 2023 07:05)  POCT Blood Glucose.: 173 mg/dL (18 Feb 2023 21:06)  POCT Blood Glucose.: 257 mg/dL (18 Feb 2023 16:31)  POCT Blood Glucose.: 144 mg/dL (18 Feb 2023 11:27)                          10.3   7.98  )-----------( 457      ( 19 Feb 2023 05:53 )             34.2     02-19    134<L>  |  93<L>  |  34<H>  ----------------------------<  210<H>  4.0   |  23  |  8.29<H>    Ca    9.0      19 Feb 2023 05:53                  RADIOLOGY & ADDITIONAL TESTS:    Telemetry Personally Reviewed -     Imaging Personally Reviewed -     Imaging Reviewed -     Consultant(s) Notes Reviewed -       Care Discussed with Consultants/Other Providers -

## 2023-02-19 NOTE — PROGRESS NOTE ADULT - SUBJECTIVE AND OBJECTIVE BOX
ORTHO PROGRESS NOTE     Pt seen and examined at bedside ***    Vital Signs Last 24 Hrs  T(C): 36.8 (19 Feb 2023 01:42), Max: 37.1 (18 Feb 2023 06:00)  T(F): 98.2 (19 Feb 2023 01:42), Max: 98.7 (18 Feb 2023 06:00)  HR: 71 (19 Feb 2023 01:42) (65 - 73)  BP: 139/51 (19 Feb 2023 01:42) (134/52 - 155/68)  BP(mean): --  RR: 17 (19 Feb 2023 01:42) (17 - 18)  SpO2: 100% (19 Feb 2023 01:42) (98% - 100%)    Parameters below as of 19 Feb 2023 01:42  Patient On (Oxygen Delivery Method): room air        Physical exam:  Gen: NAD, resting comfortably  Resp: Nonlabored  R UE: vac in place with good seal, SILT  Able to spontaneously move fingers        47y y/o Male 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 Repeat I+D on 1/14, 1/16, 1/19, 1/25, 2/7, 2/17    - PT- NWB right upper extremity  - Pain control  - DVT prophylaxis- ASA daily/SQH  - Appreciate ID recs: Unasyn in house, Augmentin 500mg QD on discharge until 2/21  - Appreciate ophthalmology recs: outpatient follow-up  - Appreciate med comangement  - Dispo: TBD ORTHO PROGRESS NOTE     Pt seen and examined at bedside. Doing well overnight.    Vital Signs Last 24 Hrs  T(C): 36.8 (19 Feb 2023 01:42), Max: 37.1 (18 Feb 2023 06:00)  T(F): 98.2 (19 Feb 2023 01:42), Max: 98.7 (18 Feb 2023 06:00)  HR: 71 (19 Feb 2023 01:42) (65 - 73)  BP: 139/51 (19 Feb 2023 01:42) (134/52 - 155/68)  BP(mean): --  RR: 17 (19 Feb 2023 01:42) (17 - 18)  SpO2: 100% (19 Feb 2023 01:42) (98% - 100%)    Parameters below as of 19 Feb 2023 01:42  Patient On (Oxygen Delivery Method): room air        Physical exam:  Gen: NAD, resting comfortably  Resp: Nonlabored  R UE: vac in place with good seal, SILT  Able to spontaneously move fingers        47y y/o Male 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 Repeat I+D on 1/14, 1/16, 1/19, 1/25, 2/7, 2/17    - PT- NWB right upper extremity  - Pain control  - DVT prophylaxis- ASA daily/SQH  - Appreciate ID recs: Unasyn in house, Augmentin 500mg QD on discharge until 2/21  - Appreciate ophthalmology recs: outpatient follow-up  - Appreciate med comangement  - Dispo: TBD

## 2023-02-19 NOTE — PROGRESS NOTE ADULT - ASSESSMENT
48 yo M w/ ESRD on HD, PVD s/p b/l BKA, DM2 c/b diabetic retinopathy, initially presenting to [facility] w/ R finger swelling and pain, found to have steal syndrome, transferred to [facility] for further management, s/p revision of AVF w/ vascular surgery on 1/13, s/p R 3rd finger amputation and repeated I&D with ortho (1/14, 1/16, 1/19, 1/25, 2/2, 2/7), s/p I&D and VAC change on 2/17.

## 2023-02-19 NOTE — PROGRESS NOTE ADULT - SUBJECTIVE AND OBJECTIVE BOX
Patient is a 47y Male whom presented to the hospital with esrd on hd     PAST MEDICAL & SURGICAL HISTORY:      MEDICATIONS  (STANDING):  dextrose 5%. 1000 milliLiter(s) (100 mL/Hr) IV Continuous <Continuous>  dextrose 5%. 1000 milliLiter(s) (50 mL/Hr) IV Continuous <Continuous>  dextrose 50% Injectable 25 Gram(s) IV Push once  dextrose 50% Injectable 25 Gram(s) IV Push once  dextrose 50% Injectable 12.5 Gram(s) IV Push once  glucagon  Injectable 1 milliGRAM(s) IntraMuscular once  insulin lispro (ADMELOG) corrective regimen sliding scale   SubCutaneous every 6 hours  pantoprazole    Tablet 40 milliGRAM(s) Oral daily  polyethylene glycol 3350 17 Gram(s) Oral daily  senna 2 Tablet(s) Oral at bedtime      Allergies    No Known Allergies    Intolerances        SOCIAL HISTORY:  Denies ETOh,Smoking,     FAMILY HISTORY:      REVIEW OF SYSTEMS:    CONSTITUTIONAL: No weakness, fevers or chills  NECK: No pain or stiffness  RESPIRATORY: No cough, wheezing, hemoptysis; No shortness of breath  CARDIOVASCULAR: No chest pain or palpitations  GASTROINTESTINAL: No abdominal or epigastric pain. No nausea, vomiting,                                                                    10.3   7.98  )-----------( 457      ( 19 Feb 2023 05:53 )             34.2       CBC Full  -  ( 19 Feb 2023 05:53 )  WBC Count : 7.98 K/uL  RBC Count : 3.75 M/uL  Hemoglobin : 10.3 g/dL  Hematocrit : 34.2 %  Platelet Count - Automated : 457 K/uL  Mean Cell Volume : 91.2 fL  Mean Cell Hemoglobin : 27.5 pg  Mean Cell Hemoglobin Concentration : 30.1 gm/dL  Auto Neutrophil # : x  Auto Lymphocyte # : x  Auto Monocyte # : x  Auto Eosinophil # : x  Auto Basophil # : x  Auto Neutrophil % : x  Auto Lymphocyte % : x  Auto Monocyte % : x  Auto Eosinophil % : x  Auto Basophil % : x      02-19    134<L>  |  93<L>  |  34<H>  ----------------------------<  210<H>  4.0   |  23  |  8.29<H>    Ca    9.0      19 Feb 2023 05:53        CAPILLARY BLOOD GLUCOSE      POCT Blood Glucose.: 122 mg/dL (19 Feb 2023 16:23)  POCT Blood Glucose.: 190 mg/dL (19 Feb 2023 11:15)  POCT Blood Glucose.: 191 mg/dL (19 Feb 2023 07:05)  POCT Blood Glucose.: 173 mg/dL (18 Feb 2023 21:06)      Vital Signs Last 24 Hrs  T(C): 36.3 (19 Feb 2023 14:16), Max: 36.8 (19 Feb 2023 01:42)  T(F): 97.3 (19 Feb 2023 14:16), Max: 98.2 (19 Feb 2023 01:42)  HR: 73 (19 Feb 2023 14:16) (65 - 78)  BP: 121/71 (19 Feb 2023 14:16) (121/71 - 155/68)  BP(mean): --  RR: 18 (19 Feb 2023 14:16) (17 - 18)  SpO2: 100% (19 Feb 2023 14:16) (98% - 100%)    Parameters below as of 19 Feb 2023 14:16  Patient On (Oxygen Delivery Method): room air                           PHYSICAL EXAM:    Constitutional: NAD  HEENT: conjunctive   clear   Neck:  No JVD  Respiratory: CTAB  Cardiovascular: S1 and S2  Gastrointestinal: BS+, soft, NT/ND   right hand dressed. bilateral BKA   right arm AVF nontender

## 2023-02-19 NOTE — PROGRESS NOTE ADULT - NUTRITIONAL ASSESSMENT
MEDICATIONS  (STANDING):  acetaminophen     Tablet .. 975 milliGRAM(s) Oral every 8 hours  ampicillin/sulbactam  IVPB 3 Gram(s) IV Intermittent every 24 hours  aspirin  chewable 81 milliGRAM(s) Oral daily  chlorhexidine 2% Cloths 1 Application(s) Topical <User Schedule>  dorzolamide 2%/timolol 0.5% Ophthalmic Solution 1 Drop(s) Both EYES two times a day  epoetin deidre-epbx (RETACRIT) Injectable 77629 Unit(s) IV Push <User Schedule>  insulin lispro (ADMELOG) corrective regimen sliding scale   SubCutaneous at bedtime  insulin lispro (ADMELOG) corrective regimen sliding scale   SubCutaneous three times a day before meals  midodrine. 10 milliGRAM(s) Oral three times a day  multivitamin 1 Tablet(s) Oral daily

## 2023-02-19 NOTE — PROGRESS NOTE ADULT - ASSESSMENT
Patient is a 48 y/o male PMH DM2, Bilateral BKA, ESRD (on HD MWF), last dialyzed yesterday transferred from [facility] emergently for evaluation of evaluation of right finger swelling/pain. Patient reports history of right finger pain after he had a fall one year ago. Patient had a wound on her second/middle finger who he was seeing a hand surgeon for outpatient but unable ultimately to continue seeing due to insurance issues. Patient reports his middle finger developed increased swelling and became black in color about two weeks ago. Denies fevers. Patient transferred to [facility] for further evaluation and emergent OR. Patient received broad spectrum Abx of Zosyn/vanco/clindamycin at [place]. Interpretor phone used for translation with patient. ID# [id number]        f/u  blood and urine cx,serial lactate levels,monitor vitals closley,ivfs hydration,monitor urine output and renal profile,iv abx as per id cons  ampicillin/sulbactam  IVPB 3 Gram(s) IV Intermittent every 24 hours    esrd on hd mwf   Excess fluids and waste products will be removed from your blood; your electrolytes will be balanced; your blood pressure will be controlled.    BP monitoring,continue current antihypertensive meds, low salt diet,followup with PMD in 1-2 weeks      ANEMIA PLAN:  Anemia of chronic disease:  Well controlled by epo  H and H subtherapeutic .  We will continue epo aiming for a HCT of 32-36 %.   We will monitor Iron stores, B12 and RBC folate .  epoetin deidre-epbx (RETACRIT) Injectable 68144 Unit(s) IV Push     Accuchecks monitoring and insulin sliding scale coverage, no concentrated sweets diet, serial labs and f/up with PMD, monitor HB A 1 C every 3-4 mnth  piperacillin/tazobactam IVPB.. 3.375 Gram(s) IV Intermittent every 12 hours

## 2023-02-20 LAB
ANION GAP SERPL CALC-SCNC: 24 MMOL/L — HIGH (ref 7–14)
BUN SERPL-MCNC: 44 MG/DL — HIGH (ref 7–23)
CALCIUM SERPL-MCNC: 9 MG/DL — SIGNIFICANT CHANGE UP (ref 8.4–10.5)
CHLORIDE SERPL-SCNC: 93 MMOL/L — LOW (ref 98–107)
CO2 SERPL-SCNC: 16 MMOL/L — LOW (ref 22–31)
CREAT SERPL-MCNC: 10.04 MG/DL — HIGH (ref 0.5–1.3)
EGFR: 6 ML/MIN/1.73M2 — LOW
GLUCOSE BLDC GLUCOMTR-MCNC: 133 MG/DL — HIGH (ref 70–99)
GLUCOSE BLDC GLUCOMTR-MCNC: 186 MG/DL — HIGH (ref 70–99)
GLUCOSE BLDC GLUCOMTR-MCNC: 199 MG/DL — HIGH (ref 70–99)
GLUCOSE BLDC GLUCOMTR-MCNC: 237 MG/DL — HIGH (ref 70–99)
GLUCOSE SERPL-MCNC: 203 MG/DL — HIGH (ref 70–99)
HCT VFR BLD CALC: 31.8 % — LOW (ref 39–50)
HGB BLD-MCNC: 9.6 G/DL — LOW (ref 13–17)
MAGNESIUM SERPL-MCNC: 2.4 MG/DL — SIGNIFICANT CHANGE UP (ref 1.6–2.6)
MCHC RBC-ENTMCNC: 26.7 PG — LOW (ref 27–34)
MCHC RBC-ENTMCNC: 30.2 GM/DL — LOW (ref 32–36)
MCV RBC AUTO: 88.6 FL — SIGNIFICANT CHANGE UP (ref 80–100)
NRBC # BLD: 0 /100 WBCS — SIGNIFICANT CHANGE UP (ref 0–0)
NRBC # FLD: 0 K/UL — SIGNIFICANT CHANGE UP (ref 0–0)
PHOSPHATE SERPL-MCNC: 7.4 MG/DL — HIGH (ref 2.5–4.5)
PLATELET # BLD AUTO: 466 K/UL — HIGH (ref 150–400)
POTASSIUM SERPL-MCNC: 4.9 MMOL/L — SIGNIFICANT CHANGE UP (ref 3.5–5.3)
POTASSIUM SERPL-SCNC: 4.9 MMOL/L — SIGNIFICANT CHANGE UP (ref 3.5–5.3)
RBC # BLD: 3.59 M/UL — LOW (ref 4.2–5.8)
RBC # FLD: 15 % — HIGH (ref 10.3–14.5)
SODIUM SERPL-SCNC: 133 MMOL/L — LOW (ref 135–145)
WBC # BLD: 8.42 K/UL — SIGNIFICANT CHANGE UP (ref 3.8–10.5)
WBC # FLD AUTO: 8.42 K/UL — SIGNIFICANT CHANGE UP (ref 3.8–10.5)

## 2023-02-20 PROCEDURE — 99232 SBSQ HOSP IP/OBS MODERATE 35: CPT

## 2023-02-20 RX ADMIN — Medication 975 MILLIGRAM(S): at 23:34

## 2023-02-20 RX ADMIN — Medication 975 MILLIGRAM(S): at 14:23

## 2023-02-20 RX ADMIN — Medication 975 MILLIGRAM(S): at 06:33

## 2023-02-20 RX ADMIN — Medication 2: at 17:28

## 2023-02-20 RX ADMIN — INSULIN GLARGINE 4 UNIT(S): 100 INJECTION, SOLUTION SUBCUTANEOUS at 22:30

## 2023-02-20 RX ADMIN — Medication 81 MILLIGRAM(S): at 12:30

## 2023-02-20 RX ADMIN — DORZOLAMIDE HYDROCHLORIDE TIMOLOL MALEATE 1 DROP(S): 20; 5 SOLUTION/ DROPS OPHTHALMIC at 17:30

## 2023-02-20 RX ADMIN — Medication 1: at 07:16

## 2023-02-20 RX ADMIN — Medication 975 MILLIGRAM(S): at 22:32

## 2023-02-20 RX ADMIN — Medication 975 MILLIGRAM(S): at 14:53

## 2023-02-20 RX ADMIN — CHLORHEXIDINE GLUCONATE 1 APPLICATION(S): 213 SOLUTION TOPICAL at 07:16

## 2023-02-20 RX ADMIN — HEPARIN SODIUM 5000 UNIT(S): 5000 INJECTION INTRAVENOUS; SUBCUTANEOUS at 22:32

## 2023-02-20 RX ADMIN — AMPICILLIN SODIUM AND SULBACTAM SODIUM 200 GRAM(S): 250; 125 INJECTION, POWDER, FOR SUSPENSION INTRAMUSCULAR; INTRAVENOUS at 02:24

## 2023-02-20 RX ADMIN — Medication 975 MILLIGRAM(S): at 06:43

## 2023-02-20 RX ADMIN — DORZOLAMIDE HYDROCHLORIDE TIMOLOL MALEATE 1 DROP(S): 20; 5 SOLUTION/ DROPS OPHTHALMIC at 06:33

## 2023-02-20 RX ADMIN — HEPARIN SODIUM 5000 UNIT(S): 5000 INJECTION INTRAVENOUS; SUBCUTANEOUS at 14:23

## 2023-02-20 RX ADMIN — ERYTHROPOIETIN 10000 UNIT(S): 10000 INJECTION, SOLUTION INTRAVENOUS; SUBCUTANEOUS at 08:46

## 2023-02-20 NOTE — PROGRESS NOTE ADULT - ASSESSMENT
Patient is a 48 y/o male PMH DM2, Bilateral BKA, ESRD (on HD MWF), last dialyzed yesterday transferred from [facility] emergently for evaluation of evaluation of right finger swelling/pain. Patient reports history of right finger pain after he had a fall one year ago. Patient had a wound on her second/middle finger who he was seeing a hand surgeon for outpatient but unable ultimately to continue seeing due to insurance issues. Patient reports his middle finger developed increased swelling and became black in color about two weeks ago. Denies fevers. Patient transferred to [facility] for further evaluation and emergent OR. Patient received broad spectrum Abx of Zosyn/vanco/clindamycin at [place]. Interpretor phone used for translation with patient. ID# [id number]        f/u  blood and urine cx,serial lactate levels,monitor vitals closley,ivfs hydration,monitor urine output and renal profile,iv abx as per id cons  ampicillin/sulbactam  IVPB 3 Gram(s) IV Intermittent every 24 hours    esrd on hd mwf   Excess fluids and waste products will be removed from your blood; your electrolytes will be balanced; your blood pressure will be controlled.    BP monitoring,continue current antihypertensive meds, low salt diet,followup with PMD in 1-2 weeks      ANEMIA PLAN:  Anemia of chronic disease:  Well controlled by epo  H and H subtherapeutic .  We will continue epo aiming for a HCT of 32-36 %.   We will monitor Iron stores, B12 and RBC folate .  epoetin deidre-epbx (RETACRIT) Injectable 11439 Unit(s) IV Push     Accuchecks monitoring and insulin sliding scale coverage, no concentrated sweets diet, serial labs and f/up with PMD, monitor HB A 1 C every 3-4 mnth  piperacillin/tazobactam IVPB.. 3.375 Gram(s) IV Intermittent every 12 hours

## 2023-02-20 NOTE — PROGRESS NOTE ADULT - PROBLEM SELECTOR PLAN 1
Steal syndrome as complication of dialysis access, c/b R 3rd digit gangrene  - s/p RUE fistula repair on 1/13  - s/p R 3rd finger amputation and repeated I&D w/ ortho  - c/w pain management as per ortho  - c/w bowel regimen to prevent opioid induced constipation  - ID recs appreciated, wound cx polymicrobial (E. coli, strep, bacteroides)  - c/w IV unasyn as per ID, plan for 6 weeks of abx til 2/21   - s/p I&D and VAC change on Feb 17

## 2023-02-20 NOTE — PROGRESS NOTE ADULT - SUBJECTIVE AND OBJECTIVE BOX
Patient is a 47y old  Male who presents with a chief complaint of esrd on hd (19 Feb 2023 11:35)      SUBJECTIVE / OVERNIGHT EVENTS:    No events overnight. This AM, patient without n/v/d/cp/sob.      Seen in HD suite.    MEDICATIONS  (STANDING):  acetaminophen     Tablet .. 975 milliGRAM(s) Oral every 8 hours  ampicillin/sulbactam  IVPB 3 Gram(s) IV Intermittent every 24 hours  aspirin  chewable 81 milliGRAM(s) Oral daily  chlorhexidine 2% Cloths 1 Application(s) Topical <User Schedule>  dextrose 5%. 1000 milliLiter(s) (50 mL/Hr) IV Continuous <Continuous>  dextrose 5%. 1000 milliLiter(s) (100 mL/Hr) IV Continuous <Continuous>  dextrose 50% Injectable 25 Gram(s) IV Push once  dextrose 50% Injectable 12.5 Gram(s) IV Push once  dextrose 50% Injectable 25 Gram(s) IV Push once  dorzolamide 2%/timolol 0.5% Ophthalmic Solution 1 Drop(s) Both EYES two times a day  epoetin deidre-epbx (RETACRIT) Injectable 34959 Unit(s) IV Push <User Schedule>  glucagon  Injectable 1 milliGRAM(s) IntraMuscular once  heparin   Injectable 5000 Unit(s) SubCutaneous every 8 hours  insulin glargine Injectable (LANTUS) 4 Unit(s) SubCutaneous at bedtime  insulin lispro (ADMELOG) corrective regimen sliding scale   SubCutaneous three times a day before meals  insulin lispro (ADMELOG) corrective regimen sliding scale   SubCutaneous at bedtime  multivitamin 1 Tablet(s) Oral daily    MEDICATIONS  (PRN):  bisacodyl Suppository 10 milliGRAM(s) Rectal daily PRN If no bowel movement  dextrose Oral Gel 15 Gram(s) Oral once PRN Blood Glucose LESS THAN 70 milliGRAM(s)/deciliter  lactulose Syrup 10 Gram(s) Oral daily PRN Constipation  oxyCODONE    IR 5 milliGRAM(s) Oral every 6 hours PRN Moderate Pain (4 - 6)      PHYSICAL EXAM:  T(C): 36.9 (02-20-23 @ 08:25), Max: 36.9 (02-20-23 @ 08:25)  HR: 67 (02-20-23 @ 08:25) (65 - 73)  BP: 164/83 (02-20-23 @ 08:25) (121/71 - 164/83)  RR: 16 (02-20-23 @ 08:25) (16 - 18)  SpO2: 100% (02-20-23 @ 08:25) (100% - 100%)  I&O's Summary    GENERAL: NAD, well-developed, undergoing HD via RUE AVF  HEAD:  Atraumatic, Normocephalic, MMM  CHEST/LUNG: No use of accessory muscles, CTAB, breathing non-labored  COR: RR, no mrcg  ABD: Soft, ND/NT, +BS  PSYCH: AAOx3  NEUROLOGY: CN II-XII grossly intact, moving all extremities  SKIN: No rashes or lesions  EXT: wwp, no cce; s/p R finger amputation, s/p b/l BKA    LABS:  CAPILLARY BLOOD GLUCOSE      POCT Blood Glucose.: 186 mg/dL (20 Feb 2023 06:27)  POCT Blood Glucose.: 211 mg/dL (19 Feb 2023 21:26)  POCT Blood Glucose.: 122 mg/dL (19 Feb 2023 16:23)                          9.6    8.42  )-----------( 466      ( 20 Feb 2023 08:35 )             31.8     02-20    133<L>  |  93<L>  |  44<H>  ----------------------------<  203<H>  4.9   |  16<L>  |  10.04<H>    Ca    9.0      20 Feb 2023 08:35  Phos  7.4     02-20  Mg     2.40     02-20                  RADIOLOGY & ADDITIONAL TESTS:    Telemetry Personally Reviewed -     Imaging Personally Reviewed -     Imaging Reviewed -     Consultant(s) Notes Reviewed -       Care Discussed with Consultants/Other Providers -

## 2023-02-20 NOTE — PROGRESS NOTE ADULT - NUTRITIONAL ASSESSMENT
MEDICATIONS  (STANDING):  acetaminophen     Tablet .. 975 milliGRAM(s) Oral every 8 hours  ampicillin/sulbactam  IVPB 3 Gram(s) IV Intermittent every 24 hours  aspirin  chewable 81 milliGRAM(s) Oral daily  chlorhexidine 2% Cloths 1 Application(s) Topical <User Schedule>  dextrose 5%. 1000 milliLiter(s) (50 mL/Hr) IV Continuous <Continuous>  dextrose 5%. 1000 milliLiter(s) (100 mL/Hr) IV Continuous <Continuous>  dextrose 50% Injectable 25 Gram(s) IV Push once  dextrose 50% Injectable 12.5 Gram(s) IV Push once  dextrose 50% Injectable 25 Gram(s) IV Push once  dorzolamide 2%/timolol 0.5% Ophthalmic Solution 1 Drop(s) Both EYES two times a day  epoetin deidre-epbx (RETACRIT) Injectable 14118 Unit(s) IV Push <User Schedule>  glucagon  Injectable 1 milliGRAM(s) IntraMuscular once  heparin   Injectable 5000 Unit(s) SubCutaneous every 8 hours  insulin glargine Injectable (LANTUS) 4 Unit(s) SubCutaneous at bedtime  insulin lispro (ADMELOG) corrective regimen sliding scale   SubCutaneous three times a day before meals  insulin lispro (ADMELOG) corrective regimen sliding scale   SubCutaneous at bedtime  multivitamin 1 Tablet(s) Oral daily

## 2023-02-20 NOTE — PROGRESS NOTE ADULT - ASSESSMENT
46 yo M w/ ESRD on HD, PVD s/p b/l BKA, DM2 c/b diabetic retinopathy, initially presenting to [facility] w/ R finger swelling and pain, found to have steal syndrome, transferred to [facility] for further management, s/p revision of AVF w/ vascular surgery on 1/13, s/p R 3rd finger amputation and repeated I&D with ortho (1/14, 1/16, 1/19, 1/25, 2/2, 2/7), s/p I&D and VAC change on 2/17.

## 2023-02-20 NOTE — PROGRESS NOTE ADULT - SUBJECTIVE AND OBJECTIVE BOX
Patient is a 47y Male whom presented to the hospital with esrd on hd     PAST MEDICAL & SURGICAL HISTORY:      MEDICATIONS  (STANDING):  dextrose 5%. 1000 milliLiter(s) (100 mL/Hr) IV Continuous <Continuous>  dextrose 5%. 1000 milliLiter(s) (50 mL/Hr) IV Continuous <Continuous>  dextrose 50% Injectable 25 Gram(s) IV Push once  dextrose 50% Injectable 25 Gram(s) IV Push once  dextrose 50% Injectable 12.5 Gram(s) IV Push once  glucagon  Injectable 1 milliGRAM(s) IntraMuscular once  insulin lispro (ADMELOG) corrective regimen sliding scale   SubCutaneous every 6 hours  pantoprazole    Tablet 40 milliGRAM(s) Oral daily  polyethylene glycol 3350 17 Gram(s) Oral daily  senna 2 Tablet(s) Oral at bedtime      Allergies    No Known Allergies    Intolerances        SOCIAL HISTORY:  Denies ETOh,Smoking,     FAMILY HISTORY:      REVIEW OF SYSTEMS:    CONSTITUTIONAL: No weakness, fevers or chills  NECK: No pain or stiffness  RESPIRATORY: No cough, wheezing, hemoptysis; No shortness of breath  CARDIOVASCULAR: No chest pain or palpitations  GASTROINTESTINAL: No abdominal or epigastric pain. No nausea, vomiting,                                                                                          9.6    8.42  )-----------( 466      ( 20 Feb 2023 08:35 )             31.8       CBC Full  -  ( 20 Feb 2023 08:35 )  WBC Count : 8.42 K/uL  RBC Count : 3.59 M/uL  Hemoglobin : 9.6 g/dL  Hematocrit : 31.8 %  Platelet Count - Automated : 466 K/uL  Mean Cell Volume : 88.6 fL  Mean Cell Hemoglobin : 26.7 pg  Mean Cell Hemoglobin Concentration : 30.2 gm/dL  Auto Neutrophil # : x  Auto Lymphocyte # : x  Auto Monocyte # : x  Auto Eosinophil # : x  Auto Basophil # : x  Auto Neutrophil % : x  Auto Lymphocyte % : x  Auto Monocyte % : x  Auto Eosinophil % : x  Auto Basophil % : x      02-20    133<L>  |  93<L>  |  44<H>  ----------------------------<  203<H>  4.9   |  16<L>  |  10.04<H>    Ca    9.0      20 Feb 2023 08:35  Phos  7.4     02-20  Mg     2.40     02-20        CAPILLARY BLOOD GLUCOSE      POCT Blood Glucose.: 237 mg/dL (20 Feb 2023 16:31)  POCT Blood Glucose.: 133 mg/dL (20 Feb 2023 11:37)  POCT Blood Glucose.: 186 mg/dL (20 Feb 2023 06:27)  POCT Blood Glucose.: 211 mg/dL (19 Feb 2023 21:26)      Vital Signs Last 24 Hrs  T(C): 36.7 (20 Feb 2023 18:47), Max: 37.1 (20 Feb 2023 14:30)  T(F): 98 (20 Feb 2023 18:47), Max: 98.7 (20 Feb 2023 14:30)  HR: 88 (20 Feb 2023 18:47) (65 - 88)  BP: 93/48 (20 Feb 2023 18:47) (93/48 - 164/83)  BP(mean): --  RR: 18 (20 Feb 2023 18:47) (16 - 18)  SpO2: 99% (20 Feb 2023 18:47) (99% - 100%)    Parameters below as of 20 Feb 2023 18:47  Patient On (Oxygen Delivery Method): room air                         PHYSICAL EXAM:    Constitutional: NAD  HEENT: conjunctive   clear   Neck:  No JVD  Respiratory: CTAB  Cardiovascular: S1 and S2  Gastrointestinal: BS+, soft, NT/ND   right hand dressed. bilateral BKA   right arm AVF nontender

## 2023-02-20 NOTE — PROGRESS NOTE ADULT - SUBJECTIVE AND OBJECTIVE BOX
Pt seen/examined. Doing well. Pain controlled. No acute overnight complaints or events.    T(C): 36.8 (02-20-23 @ 06:30), Max: 36.8 (02-20-23 @ 06:30)  HR: 65 (02-20-23 @ 06:30) (65 - 78)  BP: 142/53 (02-20-23 @ 06:30) (121/71 - 150/68)  RR: 17 (02-20-23 @ 06:30) (17 - 18)  SpO2: 100% (02-20-23 @ 06:30) (100% - 100%)  Wt(kg): --  - Gen: NAD    Physical exam:  Gen: NAD, resting comfortably  Resp: Nonlabored  R UE: vac in place with good seal, SILT  Able to spontaneously move fingers    47y y/o Male 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 Repeat I+D on 1/14, 1/16, 1/19, 1/25, 2/7, 2/17    - PT- NWB right upper extremity  - Pain control  - DVT prophylaxis- ASA daily/SQH  - Appreciate ID recs: Unasyn in house, Augmentin 500mg QD on discharge until 2/21  - Appreciate ophthalmology recs: outpatient follow-up  - Appreciate med comangement  - Dispo: TBD

## 2023-02-21 LAB
GLUCOSE BLDC GLUCOMTR-MCNC: 142 MG/DL — HIGH (ref 70–99)
GLUCOSE BLDC GLUCOMTR-MCNC: 172 MG/DL — HIGH (ref 70–99)
GLUCOSE BLDC GLUCOMTR-MCNC: 200 MG/DL — HIGH (ref 70–99)
GLUCOSE BLDC GLUCOMTR-MCNC: 236 MG/DL — HIGH (ref 70–99)

## 2023-02-21 PROCEDURE — 99233 SBSQ HOSP IP/OBS HIGH 50: CPT

## 2023-02-21 RX ADMIN — Medication 81 MILLIGRAM(S): at 12:09

## 2023-02-21 RX ADMIN — CHLORHEXIDINE GLUCONATE 1 APPLICATION(S): 213 SOLUTION TOPICAL at 07:11

## 2023-02-21 RX ADMIN — HEPARIN SODIUM 5000 UNIT(S): 5000 INJECTION INTRAVENOUS; SUBCUTANEOUS at 07:12

## 2023-02-21 RX ADMIN — HEPARIN SODIUM 5000 UNIT(S): 5000 INJECTION INTRAVENOUS; SUBCUTANEOUS at 21:57

## 2023-02-21 RX ADMIN — Medication 975 MILLIGRAM(S): at 07:13

## 2023-02-21 RX ADMIN — OXYCODONE HYDROCHLORIDE 5 MILLIGRAM(S): 5 TABLET ORAL at 03:46

## 2023-02-21 RX ADMIN — Medication 975 MILLIGRAM(S): at 07:31

## 2023-02-21 RX ADMIN — Medication 975 MILLIGRAM(S): at 18:50

## 2023-02-21 RX ADMIN — Medication 975 MILLIGRAM(S): at 18:19

## 2023-02-21 RX ADMIN — INSULIN GLARGINE 4 UNIT(S): 100 INJECTION, SOLUTION SUBCUTANEOUS at 21:55

## 2023-02-21 RX ADMIN — HEPARIN SODIUM 5000 UNIT(S): 5000 INJECTION INTRAVENOUS; SUBCUTANEOUS at 14:24

## 2023-02-21 RX ADMIN — DORZOLAMIDE HYDROCHLORIDE TIMOLOL MALEATE 1 DROP(S): 20; 5 SOLUTION/ DROPS OPHTHALMIC at 18:19

## 2023-02-21 RX ADMIN — DORZOLAMIDE HYDROCHLORIDE TIMOLOL MALEATE 1 DROP(S): 20; 5 SOLUTION/ DROPS OPHTHALMIC at 07:12

## 2023-02-21 RX ADMIN — Medication 1: at 11:30

## 2023-02-21 RX ADMIN — AMPICILLIN SODIUM AND SULBACTAM SODIUM 200 GRAM(S): 250; 125 INJECTION, POWDER, FOR SUSPENSION INTRAMUSCULAR; INTRAVENOUS at 01:58

## 2023-02-21 RX ADMIN — Medication 1: at 07:42

## 2023-02-21 RX ADMIN — OXYCODONE HYDROCHLORIDE 5 MILLIGRAM(S): 5 TABLET ORAL at 04:15

## 2023-02-21 NOTE — PROGRESS NOTE ADULT - PROBLEM SELECTOR PLAN 1
Steal syndrome as complication of dialysis access, c/b R 3rd digit gangrene  - s/p RUE fistula repair on 1/13  - s/p R 3rd finger amputation and repeated I&D w/ ortho  - c/w pain management as per ortho  - c/w bowel regimen to prevent opioid induced constipation  - ID recs appreciated, wound cx polymicrobial (E. coli, strep, bacteroides)  - c/w IV unasyn as per ID, plan for 6 weeks of abx til 2/21   - s/p I&D and VAC change on Feb 17 Steal syndrome as complication of dialysis access, c/b R 3rd digit gangrene  - s/p RUE fistula repair on 1/13  - s/p R 3rd finger amputation and repeated I&D w/ ortho  - c/w pain management as per ortho  - c/w bowel regimen to prevent opioid induced constipation  - ID recs appreciated, wound cx polymicrobial (E. coli, strep, bacteroides)  - c/w IV unasyn as per ID, plan for 6 weeks of abx til 2/21   - s/p I&D and VAC change on Feb 17  -d/c planning after vac removed on Friday Steal syndrome as complication of dialysis access, c/b R 3rd digit gangrene  - s/p RUE fistula repair on 1/13  - s/p R 3rd finger amputation and repeated I&D w/ ortho  - c/w pain management as per ortho  - c/w bowel regimen to prevent opioid induced constipation  - ID recs appreciated, wound cx polymicrobial (E. coli, strep, bacteroides)  - c/w IV unasyn as per ID, plan for 6 weeks of abx til 2/21   - s/p I&D and VAC change on Feb 17  - Surgery to check wound again on thursday and decide need for repeat washout. If no washout can d/c planning after vac removed on Friday.

## 2023-02-21 NOTE — PROGRESS NOTE ADULT - SUBJECTIVE AND OBJECTIVE BOX
ORTHO PROGRESS NOTE     Pt seen and examined at bedside, denies SOB, CP, Dizziness. N/V/D /HA.  No significant overnight events. Pain well controlled.    Vital Signs Last 24 Hrs  T(C): 36.7 (20 Feb 2023 21:24), Max: 37.1 (20 Feb 2023 14:30)  T(F): 98 (20 Feb 2023 21:24), Max: 98.7 (20 Feb 2023 14:30)  HR: 66 (20 Feb 2023 21:24) (66 - 88)  BP: 100/44 (20 Feb 2023 21:24) (93/48 - 164/83)  BP(mean): --  RR: 17 (20 Feb 2023 21:24) (16 - 18)  SpO2: 100% (20 Feb 2023 21:24) (99% - 100%)    Parameters below as of 20 Feb 2023 21:24  Patient On (Oxygen Delivery Method): room air        Physical exam:  Gen: NAD, resting comfortably  Resp: Nonlabored  R UE: vac in place with good seal, SILT  Able to spontaneously move fingers      Labs:  CBC Full  -  ( 20 Feb 2023 08:35 )  WBC Count : 8.42 K/uL  RBC Count : 3.59 M/uL  Hemoglobin : 9.6 g/dL  Hematocrit : 31.8 %  Platelet Count - Automated : 466 K/uL  Mean Cell Volume : 88.6 fL  Mean Cell Hemoglobin : 26.7 pg  Mean Cell Hemoglobin Concentration : 30.2 gm/dL  Auto Neutrophil # : x  Auto Lymphocyte # : x  Auto Monocyte # : x  Auto Eosinophil # : x  Auto Basophil # : x  Auto Neutrophil % : x  Auto Lymphocyte % : x  Auto Monocyte % : x  Auto Eosinophil % : x  Auto Basophil % : x      02-20    133<L>  |  93<L>  |  44<H>  ----------------------------<  203<H>  4.9   |  16<L>  |  10.04<H>    Ca    9.0      20 Feb 2023 08:35  Phos  7.4     02-20  Mg     2.40     02-20            47y y/o Male 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 Repeat I+D on 1/14, 1/16, 1/19, 1/25, 2/7, 2/17. Plan for bedside vac exchange 2/23    - PT- NWB right upper extremity  - Pain control  - DVT prophylaxis- ASA daily/SQH  - Appreciate ID recs: Unasyn in house, Augmentin 500mg QD on discharge until 2/21  - Appreciate ophthalmology recs: outpatient follow-up  - Appreciate med comangement  - Dispo: TBD

## 2023-02-21 NOTE — PROGRESS NOTE ADULT - SUBJECTIVE AND OBJECTIVE BOX
Patient is a 47y Male whom presented to the hospital with esrd on hd     PAST MEDICAL & SURGICAL HISTORY:      MEDICATIONS  (STANDING):  dextrose 5%. 1000 milliLiter(s) (100 mL/Hr) IV Continuous <Continuous>  dextrose 5%. 1000 milliLiter(s) (50 mL/Hr) IV Continuous <Continuous>  dextrose 50% Injectable 25 Gram(s) IV Push once  dextrose 50% Injectable 25 Gram(s) IV Push once  dextrose 50% Injectable 12.5 Gram(s) IV Push once  glucagon  Injectable 1 milliGRAM(s) IntraMuscular once  insulin lispro (ADMELOG) corrective regimen sliding scale   SubCutaneous every 6 hours  pantoprazole    Tablet 40 milliGRAM(s) Oral daily  polyethylene glycol 3350 17 Gram(s) Oral daily  senna 2 Tablet(s) Oral at bedtime      Allergies    No Known Allergies    Intolerances        SOCIAL HISTORY:  Denies ETOh,Smoking,     FAMILY HISTORY:      REVIEW OF SYSTEMS:    CONSTITUTIONAL: No weakness, fevers or chills  NECK: No pain or stiffness  RESPIRATORY: No cough, wheezing, hemoptysis; No shortness of breath  CARDIOVASCULAR: No chest pain or palpitations  GASTROINTESTINAL: No abdominal or epigastric pain. No nausea, vomiting,                                                            9.6    8.42  )-----------( 466      ( 20 Feb 2023 08:35 )             31.8       CBC Full  -  ( 20 Feb 2023 08:35 )  WBC Count : 8.42 K/uL  RBC Count : 3.59 M/uL  Hemoglobin : 9.6 g/dL  Hematocrit : 31.8 %  Platelet Count - Automated : 466 K/uL  Mean Cell Volume : 88.6 fL  Mean Cell Hemoglobin : 26.7 pg  Mean Cell Hemoglobin Concentration : 30.2 gm/dL  Auto Neutrophil # : x  Auto Lymphocyte # : x  Auto Monocyte # : x  Auto Eosinophil # : x  Auto Basophil # : x  Auto Neutrophil % : x  Auto Lymphocyte % : x  Auto Monocyte % : x  Auto Eosinophil % : x  Auto Basophil % : x      02-20    133<L>  |  93<L>  |  44<H>  ----------------------------<  203<H>  4.9   |  16<L>  |  10.04<H>    Ca    9.0      20 Feb 2023 08:35  Phos  7.4     02-20  Mg     2.40     02-20        CAPILLARY BLOOD GLUCOSE      POCT Blood Glucose.: 142 mg/dL (21 Feb 2023 16:23)  POCT Blood Glucose.: 200 mg/dL (21 Feb 2023 11:17)  POCT Blood Glucose.: 172 mg/dL (21 Feb 2023 07:21)  POCT Blood Glucose.: 199 mg/dL (20 Feb 2023 22:02)      Vital Signs Last 24 Hrs  T(C): 36.6 (21 Feb 2023 17:34), Max: 36.9 (21 Feb 2023 06:00)  T(F): 97.9 (21 Feb 2023 17:34), Max: 98.5 (21 Feb 2023 06:00)  HR: 73 (21 Feb 2023 17:34) (64 - 73)  BP: 119/56 (21 Feb 2023 17:34) (100/44 - 119/56)  BP(mean): --  RR: 18 (21 Feb 2023 17:34) (17 - 18)  SpO2: 100% (21 Feb 2023 17:34) (99% - 100%)    Parameters below as of 21 Feb 2023 17:34  Patient On (Oxygen Delivery Method): room air                       PHYSICAL EXAM:    Constitutional: NAD  HEENT: conjunctive   clear   Neck:  No JVD  Respiratory: CTAB  Cardiovascular: S1 and S2  Gastrointestinal: BS+, soft, NT/ND   right hand dressed. bilateral BKA   right arm AVF nontender

## 2023-02-21 NOTE — PROGRESS NOTE ADULT - ASSESSMENT
47 y.o. Male  w/ ESRD on HD, PVD s/p b/l BKA, DM2 c/b diabetic retinopathy, initially presenting to [facility] w/ R finger swelling and pain, found to have steal syndrome, transferred to [facility] for further management, s/p revision of AVF w/ vascular surgery on 1/13, s/p R 3rd finger amputation and repeated I&D with ortho (1/14, 1/16, 1/19, 1/25, 2/2, 2/7), s/p I&D and VAC change on 2/17.

## 2023-02-21 NOTE — PROGRESS NOTE ADULT - ASSESSMENT
Patient is a 46 y/o male PMH DM2, Bilateral BKA, ESRD (on HD MWF), last dialyzed yesterday transferred from [facility] emergently for evaluation of evaluation of right finger swelling/pain. Patient reports history of right finger pain after he had a fall one year ago. Patient had a wound on her second/middle finger who he was seeing a hand surgeon for outpatient but unable ultimately to continue seeing due to insurance issues. Patient reports his middle finger developed increased swelling and became black in color about two weeks ago. Denies fevers. Patient transferred to [facility] for further evaluation and emergent OR. Patient received broad spectrum Abx of Zosyn/vanco/clindamycin at [facility]. Interpretor phone used for translation with patient. ID# [id number]        f/u  blood and urine cx,serial lactate levels,monitor vitals [place],ivfs hydration,monitor urine output and renal profile,iv abx as per id cons  ampicillin/sulbactam  IVPB 3 Gram(s) IV Intermittent every 24 hours    esrd on hd mwf   Excess fluids and waste products will be removed from your blood; your electrolytes will be balanced; your blood pressure will be controlled.    BP monitoring,continue current antihypertensive meds, low salt diet,followup with PMD in 1-2 weeks      ANEMIA PLAN:  Anemia of chronic disease:  Well controlled by epo  H and H subtherapeutic .  We will continue epo aiming for a HCT of 32-36 %.   We will monitor Iron stores, B12 and RBC folate .  epoetin deidre-epbx (RETACRIT) Injectable 73160 Unit(s) IV Push     Accuchecks monitoring and insulin sliding scale coverage, no concentrated sweets diet, serial labs and f/up with PMD, monitor HB A 1 C every 3-4 mnth  piperacillin/tazobactam IVPB.. 3.375 Gram(s) IV Intermittent every 12 hours

## 2023-02-21 NOTE — PROGRESS NOTE ADULT - SUBJECTIVE AND OBJECTIVE BOX
Patient is a 47y old  Male who presents with a chief complaint of esrd on hd (20 Feb 2023 14:21)      SUBJECTIVE / OVERNIGHT EVENTS:    MEDICATIONS  (STANDING):  acetaminophen     Tablet .. 975 milliGRAM(s) Oral every 8 hours  ampicillin/sulbactam  IVPB 3 Gram(s) IV Intermittent every 24 hours  aspirin  chewable 81 milliGRAM(s) Oral daily  chlorhexidine 2% Cloths 1 Application(s) Topical <User Schedule>  dextrose 5%. 1000 milliLiter(s) (100 mL/Hr) IV Continuous <Continuous>  dextrose 5%. 1000 milliLiter(s) (50 mL/Hr) IV Continuous <Continuous>  dextrose 50% Injectable 25 Gram(s) IV Push once  dextrose 50% Injectable 12.5 Gram(s) IV Push once  dextrose 50% Injectable 25 Gram(s) IV Push once  dorzolamide 2%/timolol 0.5% Ophthalmic Solution 1 Drop(s) Both EYES two times a day  epoetin deidre-epbx (RETACRIT) Injectable 54358 Unit(s) IV Push <User Schedule>  glucagon  Injectable 1 milliGRAM(s) IntraMuscular once  heparin   Injectable 5000 Unit(s) SubCutaneous every 8 hours  insulin glargine Injectable (LANTUS) 4 Unit(s) SubCutaneous at bedtime  insulin lispro (ADMELOG) corrective regimen sliding scale   SubCutaneous at bedtime  insulin lispro (ADMELOG) corrective regimen sliding scale   SubCutaneous three times a day before meals  multivitamin 1 Tablet(s) Oral daily    MEDICATIONS  (PRN):  bisacodyl Suppository 10 milliGRAM(s) Rectal daily PRN If no bowel movement  dextrose Oral Gel 15 Gram(s) Oral once PRN Blood Glucose LESS THAN 70 milliGRAM(s)/deciliter  lactulose Syrup 10 Gram(s) Oral daily PRN Constipation  oxyCODONE    IR 5 milliGRAM(s) Oral every 6 hours PRN Moderate Pain (4 - 6)      Vital Signs Last 24 Hrs  T(C): 36.7 (21 Feb 2023 09:10), Max: 37.1 (20 Feb 2023 14:30)  T(F): 98 (21 Feb 2023 09:10), Max: 98.7 (20 Feb 2023 14:30)  HR: 65 (21 Feb 2023 09:10) (65 - 88)  BP: 118/45 (21 Feb 2023 09:10) (93/48 - 138/44)  BP(mean): --  RR: 17 (21 Feb 2023 09:10) (17 - 18)  SpO2: 100% (21 Feb 2023 09:10) (99% - 100%)    Parameters below as of 21 Feb 2023 09:10  Patient On (Oxygen Delivery Method): room air      CAPILLARY BLOOD GLUCOSE      POCT Blood Glucose.: 200 mg/dL (21 Feb 2023 11:17)  POCT Blood Glucose.: 172 mg/dL (21 Feb 2023 07:21)  POCT Blood Glucose.: 199 mg/dL (20 Feb 2023 22:02)  POCT Blood Glucose.: 237 mg/dL (20 Feb 2023 16:31)    I&O's Summary    20 Feb 2023 07:01  -  21 Feb 2023 07:00  --------------------------------------------------------  IN: 500 mL / OUT: 2000 mL / NET: -1500 mL    21 Feb 2023 07:01  -  21 Feb 2023 12:36  --------------------------------------------------------  IN: 240 mL / OUT: 0 mL / NET: 240 mL        PHYSICAL EXAM:  GENERAL: NAD, well-developed  HEAD:  Atraumatic, Normocephalic  EYES: EOMI, PERRLA, conjunctiva and sclera clear  NECK: Supple, No JVD  CHEST/LUNG: Clear to auscultation bilaterally; No wheeze  HEART: Regular rate and rhythm; No murmurs, rubs, or gallops  ABDOMEN: Soft, Nontender, Nondistended; Bowel sounds present  EXTREMITIES:  2+ Peripheral Pulses, No clubbing, cyanosis, or edema  PSYCH: AAOx3  NEUROLOGY: non-focal  SKIN: No rashes or lesions    LABS:                        9.6    8.42  )-----------( 466      ( 20 Feb 2023 08:35 )             31.8     02-20    133<L>  |  93<L>  |  44<H>  ----------------------------<  203<H>  4.9   |  16<L>  |  10.04<H>    Ca    9.0      20 Feb 2023 08:35  Phos  7.4     02-20  Mg     2.40     02-20                RADIOLOGY & ADDITIONAL TESTS:    Imaging Personally Reviewed:    Consultant(s) Notes Reviewed:      Care Discussed with Consultants/Other Providers:   Patient is a 47y old  Male who presents with a chief complaint of esrd on hd (20 Feb 2023 14:21)      SUBJECTIVE / OVERNIGHT EVENTS:  Patient has no new complaints. Denies cp, SOB, abdominal pain, N/V/D     MEDICATIONS  (STANDING):  acetaminophen     Tablet .. 975 milliGRAM(s) Oral every 8 hours  ampicillin/sulbactam  IVPB 3 Gram(s) IV Intermittent every 24 hours  aspirin  chewable 81 milliGRAM(s) Oral daily  chlorhexidine 2% Cloths 1 Application(s) Topical <User Schedule>  dextrose 5%. 1000 milliLiter(s) (100 mL/Hr) IV Continuous <Continuous>  dextrose 5%. 1000 milliLiter(s) (50 mL/Hr) IV Continuous <Continuous>  dextrose 50% Injectable 25 Gram(s) IV Push once  dextrose 50% Injectable 12.5 Gram(s) IV Push once  dextrose 50% Injectable 25 Gram(s) IV Push once  dorzolamide 2%/timolol 0.5% Ophthalmic Solution 1 Drop(s) Both EYES two times a day  epoetin deidre-epbx (RETACRIT) Injectable 31371 Unit(s) IV Push <User Schedule>  glucagon  Injectable 1 milliGRAM(s) IntraMuscular once  heparin   Injectable 5000 Unit(s) SubCutaneous every 8 hours  insulin glargine Injectable (LANTUS) 4 Unit(s) SubCutaneous at bedtime  insulin lispro (ADMELOG) corrective regimen sliding scale   SubCutaneous at bedtime  insulin lispro (ADMELOG) corrective regimen sliding scale   SubCutaneous three times a day before meals  multivitamin 1 Tablet(s) Oral daily    MEDICATIONS  (PRN):  bisacodyl Suppository 10 milliGRAM(s) Rectal daily PRN If no bowel movement  dextrose Oral Gel 15 Gram(s) Oral once PRN Blood Glucose LESS THAN 70 milliGRAM(s)/deciliter  lactulose Syrup 10 Gram(s) Oral daily PRN Constipation  oxyCODONE    IR 5 milliGRAM(s) Oral every 6 hours PRN Moderate Pain (4 - 6)      Vital Signs Last 24 Hrs  T(C): 36.7 (21 Feb 2023 09:10), Max: 37.1 (20 Feb 2023 14:30)  T(F): 98 (21 Feb 2023 09:10), Max: 98.7 (20 Feb 2023 14:30)  HR: 65 (21 Feb 2023 09:10) (65 - 88)  BP: 118/45 (21 Feb 2023 09:10) (93/48 - 138/44)  BP(mean): --  RR: 17 (21 Feb 2023 09:10) (17 - 18)  SpO2: 100% (21 Feb 2023 09:10) (99% - 100%)    Parameters below as of 21 Feb 2023 09:10  Patient On (Oxygen Delivery Method): room air      CAPILLARY BLOOD GLUCOSE      POCT Blood Glucose.: 200 mg/dL (21 Feb 2023 11:17)  POCT Blood Glucose.: 172 mg/dL (21 Feb 2023 07:21)  POCT Blood Glucose.: 199 mg/dL (20 Feb 2023 22:02)  POCT Blood Glucose.: 237 mg/dL (20 Feb 2023 16:31)    I&O's Summary    20 Feb 2023 07:01  -  21 Feb 2023 07:00  --------------------------------------------------------  IN: 500 mL / OUT: 2000 mL / NET: -1500 mL    21 Feb 2023 07:01  -  21 Feb 2023 12:36  --------------------------------------------------------  IN: 240 mL / OUT: 0 mL / NET: 240 mL        PHYSICAL EXAM:  GENERAL: NAD, well-developed  HEAD:  Atraumatic, Normocephalic  EYES: EOMI, PERRLA, conjunctiva and sclera clear  NECK: Supple, No JVD  CHEST/LUNG: Clear to auscultation bilaterally; No wheeze  HEART: Regular rate and rhythm; No murmurs, rubs, or gallops  ABDOMEN: Soft, Nontender, Nondistended; Bowel sounds present  EXTREMITIES:  Right hand wound vac. 2+ Peripheral Pulses, No clubbing, cyanosis, or edema  PSYCH: AAOx3  NEUROLOGY: non-focal  SKIN: No rashes or lesions    LABS:                        9.6    8.42  )-----------( 466      ( 20 Feb 2023 08:35 )             31.8     02-20    133<L>  |  93<L>  |  44<H>  ----------------------------<  203<H>  4.9   |  16<L>  |  10.04<H>    Ca    9.0      20 Feb 2023 08:35  Phos  7.4     02-20  Mg     2.40     02-20                RADIOLOGY & ADDITIONAL TESTS:    Imaging Personally Reviewed:    Consultant(s) Notes Reviewed:      Care Discussed with Consultants/Other Providers:

## 2023-02-21 NOTE — PROGRESS NOTE ADULT - PROBLEM SELECTOR PLAN 3
DM2 on long term insulin therapy, c/b diabetic retinopathy  - c/w Lantus and sliding scale coverage, Lantus dose was previously decreased 5U to 4U in anticipation of NPO status for procedure  - continue to monitor FS and adjust regimen as needed (can likely increase Lantus dose once done w/ procedure and taking diet again)  - A1C 7.1 in 1/10  - consider checking fasting lipid profile, given diabetes and hx of PVD, patient may benefit from statin therapy  - ophthalmology recs appreciated, severe proliferative diabetic retinopathy, c/w cosopt, otpt ophthalmology followup DM2 on long term insulin therapy, c/b diabetic retinopathy  - c/w Lantus and sliding scale coverage, Lantus  -can Increase Lantus back to 5 Units  - A1C 7.1 in 1/10  - consider checking fasting lipid profile, given diabetes and hx of PVD, patient may benefit from statin therapy  - ophthalmology recs appreciated, severe proliferative diabetic retinopathy, c/w cosopt, otpt ophthalmology followup DM2 on long term insulin therapy, c/b diabetic retinopathy  - c/w Lantus and sliding scale coverage, Lantus  -can Increase Lantus back to 5 Units  - A1C 7.1 in 1/10  - check fasting lipid profile in a.m., given diabetes and hx of PVD, patient may benefit from statin therapy  - ophthalmology recs appreciated, severe proliferative diabetic retinopathy, c/w cosopt, otpt ophthalmology followup

## 2023-02-21 NOTE — PROGRESS NOTE ADULT - NUTRITIONAL ASSESSMENT
MEDICATIONS  (STANDING):  acetaminophen     Tablet .. 975 milliGRAM(s) Oral every 8 hours  ampicillin/sulbactam  IVPB 3 Gram(s) IV Intermittent every 24 hours  aspirin  chewable 81 milliGRAM(s) Oral daily  chlorhexidine 2% Cloths 1 Application(s) Topical <User Schedule>  dextrose 5%. 1000 milliLiter(s) (50 mL/Hr) IV Continuous <Continuous>  dextrose 5%. 1000 milliLiter(s) (100 mL/Hr) IV Continuous <Continuous>  dextrose 50% Injectable 25 Gram(s) IV Push once  dextrose 50% Injectable 12.5 Gram(s) IV Push once  dextrose 50% Injectable 25 Gram(s) IV Push once  dorzolamide 2%/timolol 0.5% Ophthalmic Solution 1 Drop(s) Both EYES two times a day  epoetin deidre-epbx (RETACRIT) Injectable 20914 Unit(s) IV Push <User Schedule>  glucagon  Injectable 1 milliGRAM(s) IntraMuscular once  heparin   Injectable 5000 Unit(s) SubCutaneous every 8 hours  insulin glargine Injectable (LANTUS) 4 Unit(s) SubCutaneous at bedtime  insulin lispro (ADMELOG) corrective regimen sliding scale   SubCutaneous three times a day before meals  insulin lispro (ADMELOG) corrective regimen sliding scale   SubCutaneous at bedtime  multivitamin 1 Tablet(s) Oral daily

## 2023-02-22 ENCOUNTER — APPOINTMENT (OUTPATIENT)
Dept: ORTHOPEDIC SURGERY | Facility: CLINIC | Age: 48
End: 2023-02-22

## 2023-02-22 LAB
CHOLEST SERPL-MCNC: 149 MG/DL — SIGNIFICANT CHANGE UP
GLUCOSE BLDC GLUCOMTR-MCNC: 121 MG/DL — HIGH (ref 70–99)
GLUCOSE BLDC GLUCOMTR-MCNC: 188 MG/DL — HIGH (ref 70–99)
GLUCOSE BLDC GLUCOMTR-MCNC: 222 MG/DL — HIGH (ref 70–99)
GLUCOSE BLDC GLUCOMTR-MCNC: 234 MG/DL — HIGH (ref 70–99)
HDLC SERPL-MCNC: 43 MG/DL — SIGNIFICANT CHANGE UP
LIPID PNL WITH DIRECT LDL SERPL: 84 MG/DL — SIGNIFICANT CHANGE UP
NON HDL CHOLESTEROL: 106 MG/DL — SIGNIFICANT CHANGE UP
TRIGL SERPL-MCNC: 112 MG/DL — SIGNIFICANT CHANGE UP

## 2023-02-22 PROCEDURE — 99233 SBSQ HOSP IP/OBS HIGH 50: CPT

## 2023-02-22 RX ORDER — OXYCODONE HYDROCHLORIDE 5 MG/1
10 TABLET ORAL ONCE
Refills: 0 | Status: DISCONTINUED | OUTPATIENT
Start: 2023-02-22 | End: 2023-02-22

## 2023-02-22 RX ADMIN — OXYCODONE HYDROCHLORIDE 10 MILLIGRAM(S): 5 TABLET ORAL at 20:04

## 2023-02-22 RX ADMIN — OXYCODONE HYDROCHLORIDE 5 MILLIGRAM(S): 5 TABLET ORAL at 14:42

## 2023-02-22 RX ADMIN — OXYCODONE HYDROCHLORIDE 5 MILLIGRAM(S): 5 TABLET ORAL at 13:42

## 2023-02-22 RX ADMIN — Medication 2: at 12:09

## 2023-02-22 RX ADMIN — Medication 81 MILLIGRAM(S): at 12:10

## 2023-02-22 RX ADMIN — CHLORHEXIDINE GLUCONATE 1 APPLICATION(S): 213 SOLUTION TOPICAL at 05:24

## 2023-02-22 RX ADMIN — Medication 975 MILLIGRAM(S): at 02:30

## 2023-02-22 RX ADMIN — Medication 975 MILLIGRAM(S): at 01:53

## 2023-02-22 RX ADMIN — Medication 975 MILLIGRAM(S): at 12:10

## 2023-02-22 RX ADMIN — ERYTHROPOIETIN 10000 UNIT(S): 10000 INJECTION, SOLUTION INTRAVENOUS; SUBCUTANEOUS at 15:25

## 2023-02-22 RX ADMIN — HEPARIN SODIUM 5000 UNIT(S): 5000 INJECTION INTRAVENOUS; SUBCUTANEOUS at 05:23

## 2023-02-22 RX ADMIN — AMPICILLIN SODIUM AND SULBACTAM SODIUM 200 GRAM(S): 250; 125 INJECTION, POWDER, FOR SUSPENSION INTRAMUSCULAR; INTRAVENOUS at 01:53

## 2023-02-22 RX ADMIN — Medication 1: at 07:50

## 2023-02-22 RX ADMIN — DORZOLAMIDE HYDROCHLORIDE TIMOLOL MALEATE 1 DROP(S): 20; 5 SOLUTION/ DROPS OPHTHALMIC at 20:05

## 2023-02-22 RX ADMIN — DORZOLAMIDE HYDROCHLORIDE TIMOLOL MALEATE 1 DROP(S): 20; 5 SOLUTION/ DROPS OPHTHALMIC at 05:23

## 2023-02-22 RX ADMIN — Medication 975 MILLIGRAM(S): at 13:10

## 2023-02-22 RX ADMIN — HEPARIN SODIUM 5000 UNIT(S): 5000 INJECTION INTRAVENOUS; SUBCUTANEOUS at 13:42

## 2023-02-22 RX ADMIN — INSULIN GLARGINE 4 UNIT(S): 100 INJECTION, SOLUTION SUBCUTANEOUS at 21:41

## 2023-02-22 RX ADMIN — HEPARIN SODIUM 5000 UNIT(S): 5000 INJECTION INTRAVENOUS; SUBCUTANEOUS at 21:42

## 2023-02-22 RX ADMIN — OXYCODONE HYDROCHLORIDE 10 MILLIGRAM(S): 5 TABLET ORAL at 21:04

## 2023-02-22 NOTE — PROGRESS NOTE ADULT - ATTENDING COMMENTS
WEt to dry dressing change performed bedside. No further purulence appreciated
agree
Agree with above, Plan for OR today
Patient s/p revision of av fistula and I and d of right forearm abscess requiring q1 hr neurovascular checks. Good cap refill and perfusion overnight.  N on multimodal pain therapy, will consider pca if pain worsens  resp on room air  cv q1 hr neurovascular checks of right arm, hemodynamically stable on midodrine  gi reg diet and bowel regimen  gu/renal hd yesterday taken 1.3l off, hd tomorrow  heme vte ppx  id continue with zosyn  endo no changes    The patient is a critical care patient with life threatening hemodynamic and metabolic instability in SICU.  I have personally interviewed when possible and examined the patient, reviewed data and laboratory tests/x-rays and all pertinent electronic images.  I was physically present for the key portions of the evaluation and management (E/M) service provided.   The SICU team has a constant risk benefit analyzes discussion with the primary team, all consultants, House Staff and PA's on all decisions.  These diagnoses are unrelated to the surgical procedure noted above.  I meet with family if needed to get further history, discuss the case and make care decisions for this patient who might not be able to participate.  Time involved in performance of separately billable procedures was not counted toward my critical care time. There is no overlap.  I spent 55-75 minutes ( 0800Hrs-0915Hrs in AM/ 1600Hrs-1715Hrs in PM, or other time indicated) of critical care time for the diagnoses, assessment, plan and interventions.  This time excludes time spent on separate procedures and teaching.
Plan for OR today
SNEHA with open wound- plan for I&D and partial closure
agree
agree with above, plan fro repeat I&D thursday
agree with above. plan for repeat I&D, possible coverage
agree plan for wound vac exchange, dermal substitute placement today
47M w DM, Bilateral BKA, ESRD (on HD MWF), pending fistulogram with Vacular surgery tomorrow  pt comfortable appearing, no complaints, euvolemic on exam  pt denies active or recent cp or sob at rest of on exertion  pt states he is able to ambulate (with b/l LE prosthesis) more than one block without cp or sob  pt denies hx of VT, VF, SCD  denies orthopnea or extremity edema  murmur on exam noted, TTE showing no severe valvular lesions, overall LV function preserved  pt is intermediate to high risk for any procedure including intermediate risk fistula revision but is optimized from a CV perspective and may proceed without further cardiac testing  will follow with you.
I agree with the detailed interval history, physical, and plan, which I have reviewed and edited where appropriate'; also agree with notes/assessment with my team on service.  I have personally examined the patient.  I was physically present for the key portions of the evaluation and management (E/M) service provided.  I reviewed all the pertinent data.  The patient is a critical care patient with life threatening hemodynamic and metabolic instability in SICU.  The SICU team has a constant risk benefit analyzes discussion and coordinating care with the primary team and all consultants.   The patient is in SICU with the chief complaint and diagnosis mentioned in the note.   The plan will be specified in the note.  47 year old male with a PMHx of DM2, bilateral BKAs and ESRD with steal syndrome; sp right hand / forearm I&D in SICU care for pain management.  EXAM  NEUROLOGY  Exam: No focal neurologic deficits.  RESPIRATORY  Exam: Lungs clear   CARDIOVASCULAR  Exam: Regular rate  GI/NUTRITION  Exam: Abdomen soft,     PLAN:  NEUROLOGY:  - Tylenol,   -dilaudid   RESPIRATORY:  - room air  CARDIOVASCULAR:  - Midodrine 10mg TID  GI / NUTRITION:  - Diet: Consistent carb/renal diet  RENAL / GENITOURINARY:  -Monitor electrolytes  HEMATOLOGIC:   -continue with Aspirin  - VTE prophylaxis with HSQ  INFECTIOUS DISEASE:  -  Zosyn   ENDOCRINOLOGY:  - Monitor fingersticks  - Lantus 14U qhs  -Disposition: DC floor
acute postop pain  ESRD with anemia  poor peripheral venous access    -iv narcotics as needed and re-assess after OR  -anemia combination dilutional (today is an HD day and blood loss from surgical debridement) and remains hgb remains greater than transfusion threshold  -HD after OR  -CVL for durable venous access (pain meds, blood products if needed, antibiotics); will need to eventually consult IR for durable venous access upon hospital discharge
agree. Long discussion had with patient and his wife/son about prognosis
47M DM2 and complications, bilateral BKAs and ESRD (on HD - M / W / F) who was transferred from [facility] emergently for evaluation of right finger swelling / pain.  Patient was found to have steal syndrome.  Patient is now S/P revision of arteriovenous dialysis fistula by vascular surgery on 1/13/23.  Patient is also S/P right hand / forearm I&D by ortho on 1/14/23. Pending RTOR for repeat I&D with orthopedics on 1/19.    Active issues are: acute on chronic ischemic pain, hemorrhagic shock (anemia persists but shock has resolved), hyperglycemia (treated)    Pain regimen now includes methadone and oral dilaudid; patient tolerated two dressing changes with oral meds alone    Anticipate readiness for transfer to regular lester after today's surgery
I have personally interviewed and examined this patient, reviewed pertinent labs and imaging on SICU rounds.    50  minutes in total were spent in providing direct critical care for the diagnoses, assessment and plan outlined below.  This patient suffers from a critical illness that acutely impairs one or more vital organ systems such that there is a high probability of life threatening or imminent deterioration of the patient's condition.  These diagnoses are unrelated to the surgical procedure.    Additionally, time spent in teaching or the performance of separately billable procedures was not counted toward my critical care time.  There is no overlap.  Time included review of vitals, labs, imaging, discussion with consultants.    47M DM2 and complications, bilateral BKAs and ESRD (on HD - M / W / F) who was transferred from [facility] emergently for evaluation of right finger swelling / pain.  Patient was found to have steal syndrome.  Patient is now S/P revision of arteriovenous dialysis fistula by vascular surgery on 1/13/23.  Patient is also S/P right hand / forearm I&D by ortho on 1/14/23. Pending RTOR for repeat I&D with orthopedics on 1/19.    Active issues are: acute on chronic ischemic pain, hemorrhagic shock, hyperglycemia.
I agree with the detailed interval history, physical, and plan, which I have reviewed and edited where appropriate'; also agree with notes/assessment with my team on service.  I have personally examined the patient.  I was physically present for the key portions of the evaluation and management (E/M) service provided.  I reviewed all the pertinent data.  The patient is a critical care patient with life threatening hemodynamic and metabolic instability in SICU.  The SICU team has a constant risk benefit analyzes discussion and coordinating care with the primary team and all consultants.   The patient is in SICU with the chief complaint and diagnosis mentioned in the note.   The plan will be specified in the note.  47 year old male  S/P revision of arteriovenous dialysis fistula in SICU for pain management for dressing changes requiring conscious sedation.   EXAM  NEUROLOGY  RASS: 0 to -1  Exam:  Alert and oriented x4.    RESPIRATORY  Exam: Lungs clear   CARDIOVASCULAR  Exam: Regular rate   GI/NUTRITION  Exam: Abdomen soft, Non-tender    PLAN:  NEUROLOGY:  - Tylenol, dilaudid PO 2/4/8 for mild/mod/severe pain q4 PRN  RESPIRATORY:  - room air  CARDIOVASCULAR:  - Midodrine 10mg TID  GI / NUTRITION:  - Diet: Consistent carb/renal diet  RENAL / GENITOURINARY:  - Monitor electrolytes  HEMATOLOGIC:   - Aspirin  - VTE prophylaxis with HSQ  INFECTIOUS DISEASE:  - Zosyn  ENDOCRINOLOGY:  - Lantus   Disposition: SICU
I have personally interviewed and examined this patient, reviewed pertinent labs and imaging on SICU rounds.    55   minutes in total were spent in providing direct critical care for the diagnoses, assessment and plan outlined below.  This patient suffers from a critical illness that acutely impairs one or more vital organ systems such that there is a high probability of life threatening or imminent deterioration of the patient's condition.  These diagnoses are unrelated to the surgical procedure.    Additionally, time spent in teaching or the performance of separately billable procedures was not counted toward my critical care time.  There is no overlap.  Time included review of vitals, labs, imaging, discussion with consultants.    47M DM2 and complications, bilateral BKAs and ESRD (on HD - M / W / F) who was transferred from [facility] emergently for evaluation of right finger swelling / pain.  Patient was found to have steal syndrome.  Patient is now S/P revision of arteriovenous dialysis fistula by vascular surgery on 1/13/23.  Patient is also S/P right hand / forearm I&D by ortho on 1/14/23. Pending RTOR for repeat I&D with orthopedics on 1/19.    Active issues are: acute on chronic ischemic pain, hemorrhagic shock, coagulopathy

## 2023-02-22 NOTE — PROGRESS NOTE ADULT - SUBJECTIVE AND OBJECTIVE BOX
Patient is a 47y old  Male who presents with a chief complaint of esrd on hd (21 Feb 2023 20:24)      SUBJECTIVE / OVERNIGHT EVENTS:  Patient has no new complaints. Denies cp, SOB, abdominal pain, N/V/D     MEDICATIONS  (STANDING):  acetaminophen     Tablet .. 975 milliGRAM(s) Oral every 8 hours  ampicillin/sulbactam  IVPB 3 Gram(s) IV Intermittent every 24 hours  aspirin  chewable 81 milliGRAM(s) Oral daily  chlorhexidine 2% Cloths 1 Application(s) Topical <User Schedule>  dextrose 5%. 1000 milliLiter(s) (50 mL/Hr) IV Continuous <Continuous>  dextrose 5%. 1000 milliLiter(s) (100 mL/Hr) IV Continuous <Continuous>  dextrose 50% Injectable 25 Gram(s) IV Push once  dextrose 50% Injectable 12.5 Gram(s) IV Push once  dextrose 50% Injectable 25 Gram(s) IV Push once  dorzolamide 2%/timolol 0.5% Ophthalmic Solution 1 Drop(s) Both EYES two times a day  epoetin deidre-epbx (RETACRIT) Injectable 27515 Unit(s) IV Push <User Schedule>  glucagon  Injectable 1 milliGRAM(s) IntraMuscular once  heparin   Injectable 5000 Unit(s) SubCutaneous every 8 hours  insulin glargine Injectable (LANTUS) 4 Unit(s) SubCutaneous at bedtime  insulin lispro (ADMELOG) corrective regimen sliding scale   SubCutaneous three times a day before meals  insulin lispro (ADMELOG) corrective regimen sliding scale   SubCutaneous at bedtime  multivitamin 1 Tablet(s) Oral daily    MEDICATIONS  (PRN):  bisacodyl Suppository 10 milliGRAM(s) Rectal daily PRN If no bowel movement  dextrose Oral Gel 15 Gram(s) Oral once PRN Blood Glucose LESS THAN 70 milliGRAM(s)/deciliter  lactulose Syrup 10 Gram(s) Oral daily PRN Constipation  oxyCODONE    IR 5 milliGRAM(s) Oral every 6 hours PRN Moderate Pain (4 - 6)      Vital Signs Last 24 Hrs  T(C): 36.6 (22 Feb 2023 05:20), Max: 36.8 (21 Feb 2023 14:15)  T(F): 97.8 (22 Feb 2023 05:20), Max: 98.2 (21 Feb 2023 14:15)  HR: 70 (22 Feb 2023 05:20) (64 - 73)  BP: 114/49 (22 Feb 2023 05:20) (101/68 - 119/56)  BP(mean): --  RR: 18 (22 Feb 2023 05:20) (16 - 18)  SpO2: 100% (22 Feb 2023 05:20) (100% - 100%)    Parameters below as of 22 Feb 2023 05:20  Patient On (Oxygen Delivery Method): room air      CAPILLARY BLOOD GLUCOSE      POCT Blood Glucose.: 188 mg/dL (22 Feb 2023 07:22)  POCT Blood Glucose.: 236 mg/dL (21 Feb 2023 21:38)  POCT Blood Glucose.: 142 mg/dL (21 Feb 2023 16:23)  POCT Blood Glucose.: 200 mg/dL (21 Feb 2023 11:17)    I&O's Summary    21 Feb 2023 07:01  -  22 Feb 2023 07:00  --------------------------------------------------------  IN: 720 mL / OUT: 0 mL / NET: 720 mL        PHYSICAL EXAM:  GENERAL: NAD, well-developed  HEAD:  Atraumatic, Normocephalic  EYES: EOMI, PERRLA, conjunctiva and sclera clear  NECK: Supple, No JVD  CHEST/LUNG: Clear to auscultation bilaterally; No wheeze  HEART: Regular rate and rhythm; No murmurs, rubs, or gallops  ABDOMEN: Soft, Nontender, Nondistended; Bowel sounds present  EXTREMITIES:  Right hand wound vac. 2+ Peripheral Pulses, No clubbing, cyanosis, or edema  PSYCH: AAOx3  NEUROLOGY: non-focal  SKIN: No rashes or lesions  LABS:                    RADIOLOGY & ADDITIONAL TESTS:    Imaging Personally Reviewed:    Consultant(s) Notes Reviewed:      Care Discussed with Consultants/Other Providers:

## 2023-02-22 NOTE — PROGRESS NOTE ADULT - PROBLEM SELECTOR PLAN 3
DM2 on long term insulin therapy, c/b diabetic retinopathy  - c/w Lantus and sliding scale coverage, Lantus  -can Increase Lantus back to 5 Units  - A1C 7.1 in 1/10  - check fasting lipid profile in a.m., given diabetes and hx of PVD, patient may benefit from statin therapy  - ophthalmology recs appreciated, severe proliferative diabetic retinopathy, c/w cosopt, otpt ophthalmology followup DM2 on long term insulin therapy, c/b diabetic retinopathy  - c/w Lantus and sliding scale coverage, Lantus  -can Increase Lantus back to 5 Units  - A1C 7.1 in 1/10  - fasting lipid profile WNL; no benefit from statin therapy  - ophthalmology recs appreciated, severe proliferative diabetic retinopathy, c/w cosopt, otpt ophthalmology followup

## 2023-02-22 NOTE — PROGRESS NOTE ADULT - ASSESSMENT
Patient is a 46 y/o male PMH DM2, Bilateral BKA, ESRD (on HD MWF), last dialyzed yesterday transferred from [facility] emergently for evaluation of evaluation of right finger swelling/pain. Patient reports history of right finger pain after he had a fall one year ago. Patient had a wound on her second/middle finger who he was seeing a hand surgeon for outpatient but unable ultimately to continue seeing due to insurance issues. Patient reports his middle finger developed increased swelling and became black in color about two weeks ago. Denies fevers. Patient transferred to [facility] for further evaluation and emergent OR. Patient received broad spectrum Abx of Zosyn/vanco/clindamycin at [facility]. Interpretor phone used for translation with patient. ID# [id number]        f/u  blood and urine cx,serial lactate levels,monitor vitals closley,ivfs hydration,monitor urine output and renal profile,iv abx as per id cons  ampicillin/sulbactam  IVPB 3 Gram(s) IV Intermittent every 24 hours    esrd on hd mwf   Excess fluids and waste products will be removed from your blood; your electrolytes will be balanced; your blood pressure will be controlled.    BP monitoring,continue current antihypertensive meds, low salt diet,followup with PMD in 1-2 weeks      ANEMIA PLAN:  Anemia of chronic disease:  Well controlled by epo  H and H subtherapeutic .  We will continue epo aiming for a HCT of 32-36 %.   We will monitor Iron stores, B12 and RBC folate .  epoetin deidre-epbx (RETACRIT) Injectable 81634 Unit(s) IV Push     Accuchecks monitoring and insulin sliding scale coverage, no concentrated sweets diet, serial labs and f/up with PMD, monitor HB A 1 C every 3-4 mnth  piperacillin/tazobactam IVPB.. 3.375 Gram(s) IV Intermittent every 12 hours

## 2023-02-22 NOTE — PROGRESS NOTE ADULT - SUBJECTIVE AND OBJECTIVE BOX
Patient is a 47y Male whom presented to the hospital with esrd on hd     PAST MEDICAL & SURGICAL HISTORY:      MEDICATIONS  (STANDING):  dextrose 5%. 1000 milliLiter(s) (100 mL/Hr) IV Continuous <Continuous>  dextrose 5%. 1000 milliLiter(s) (50 mL/Hr) IV Continuous <Continuous>  dextrose 50% Injectable 25 Gram(s) IV Push once  dextrose 50% Injectable 25 Gram(s) IV Push once  dextrose 50% Injectable 12.5 Gram(s) IV Push once  glucagon  Injectable 1 milliGRAM(s) IntraMuscular once  insulin lispro (ADMELOG) corrective regimen sliding scale   SubCutaneous every 6 hours  pantoprazole    Tablet 40 milliGRAM(s) Oral daily  polyethylene glycol 3350 17 Gram(s) Oral daily  senna 2 Tablet(s) Oral at bedtime      Allergies    No Known Allergies    Intolerances        SOCIAL HISTORY:  Denies ETOh,Smoking,     FAMILY HISTORY:      REVIEW OF SYSTEMS:    CONSTITUTIONAL: No weakness, fevers or chills  NECK: No pain or stiffness  RESPIRATORY: No cough, wheezing, hemoptysis; No shortness of breath  CARDIOVASCULAR: No chest pain or palpitations  GASTROINTESTINAL: No abdominal or epigastric pain. No nausea, vomiting,                                                                            CAPILLARY BLOOD GLUCOSE      POCT Blood Glucose.: 121 mg/dL (22 Feb 2023 16:28)  POCT Blood Glucose.: 222 mg/dL (22 Feb 2023 11:27)  POCT Blood Glucose.: 188 mg/dL (22 Feb 2023 07:22)  POCT Blood Glucose.: 236 mg/dL (21 Feb 2023 21:38)      Vital Signs Last 24 Hrs  T(C): 36.5 (22 Feb 2023 17:48), Max: 36.6 (22 Feb 2023 05:20)  T(F): 97.7 (22 Feb 2023 17:48), Max: 97.9 (22 Feb 2023 09:25)  HR: 74 (22 Feb 2023 17:48) (64 - 74)  BP: 130/56 (22 Feb 2023 17:48) (101/68 - 135/64)  BP(mean): --  RR: 16 (22 Feb 2023 17:48) (16 - 18)  SpO2: 100% (22 Feb 2023 17:48) (100% - 100%)    Parameters below as of 22 Feb 2023 17:48  Patient On (Oxygen Delivery Method): room air                           PHYSICAL EXAM:    Constitutional: NAD  HEENT: conjunctive   clear   Neck:  No JVD  Respiratory: CTAB  Cardiovascular: S1 and S2  Gastrointestinal: BS+, soft, NT/ND   right hand dressed. bilateral BKA   right arm AVF nontender

## 2023-02-22 NOTE — PROGRESS NOTE ADULT - NUTRITIONAL ASSESSMENT
MEDICATIONS  (STANDING):  acetaminophen     Tablet .. 975 milliGRAM(s) Oral every 8 hours  ampicillin/sulbactam  IVPB 3 Gram(s) IV Intermittent every 24 hours  aspirin  chewable 81 milliGRAM(s) Oral daily  chlorhexidine 2% Cloths 1 Application(s) Topical <User Schedule>  dextrose 5%. 1000 milliLiter(s) (50 mL/Hr) IV Continuous <Continuous>  dextrose 5%. 1000 milliLiter(s) (100 mL/Hr) IV Continuous <Continuous>  dextrose 50% Injectable 25 Gram(s) IV Push once  dextrose 50% Injectable 12.5 Gram(s) IV Push once  dextrose 50% Injectable 25 Gram(s) IV Push once  dorzolamide 2%/timolol 0.5% Ophthalmic Solution 1 Drop(s) Both EYES two times a day  epoetin deidre-epbx (RETACRIT) Injectable 44405 Unit(s) IV Push <User Schedule>  glucagon  Injectable 1 milliGRAM(s) IntraMuscular once  heparin   Injectable 5000 Unit(s) SubCutaneous every 8 hours  insulin glargine Injectable (LANTUS) 4 Unit(s) SubCutaneous at bedtime  insulin lispro (ADMELOG) corrective regimen sliding scale   SubCutaneous three times a day before meals  insulin lispro (ADMELOG) corrective regimen sliding scale   SubCutaneous at bedtime  multivitamin 1 Tablet(s) Oral daily

## 2023-02-22 NOTE — PROGRESS NOTE ADULT - ATTENDING SUPERVISION STATEMENT
Resident
Fellow
Resident

## 2023-02-22 NOTE — PROGRESS NOTE ADULT - SUBJECTIVE AND OBJECTIVE BOX
ORTHO PROGRESS NOTE     Pt seen and examined at bedside, denies SOB, CP, Dizziness. N/V/D /HA.  No significant overnight events. Pain well controlled.    Vital Signs Last 24 Hrs  T(C): 36.6 (22 Feb 2023 05:20), Max: 36.8 (21 Feb 2023 14:15)  T(F): 97.8 (22 Feb 2023 05:20), Max: 98.2 (21 Feb 2023 14:15)  HR: 70 (22 Feb 2023 05:20) (64 - 73)  BP: 114/49 (22 Feb 2023 05:20) (101/68 - 119/56)  BP(mean): --  RR: 18 (22 Feb 2023 05:20) (16 - 18)  SpO2: 100% (22 Feb 2023 05:20) (100% - 100%)    Parameters below as of 22 Feb 2023 05:20  Patient On (Oxygen Delivery Method): room air        Physical exam:  Gen: NAD, resting comfortably  Resp: Nonlabored  R UE: vac in place with good seal, SILT  Able to spontaneously move fingers      Labs:  CBC Full  -  ( 20 Feb 2023 08:35 )  WBC Count : 8.42 K/uL  RBC Count : 3.59 M/uL  Hemoglobin : 9.6 g/dL  Hematocrit : 31.8 %  Platelet Count - Automated : 466 K/uL  Mean Cell Volume : 88.6 fL  Mean Cell Hemoglobin : 26.7 pg  Mean Cell Hemoglobin Concentration : 30.2 gm/dL  Auto Neutrophil # : x  Auto Lymphocyte # : x  Auto Monocyte # : x  Auto Eosinophil # : x  Auto Basophil # : x  Auto Neutrophil % : x  Auto Lymphocyte % : x  Auto Monocyte % : x  Auto Eosinophil % : x  Auto Basophil % : x      02-20    133<L>  |  93<L>  |  44<H>  ----------------------------<  203<H>  4.9   |  16<L>  |  10.04<H>    Ca    9.0      20 Feb 2023 08:35  Phos  7.4     02-20  Mg     2.40     02-20        47y y/o Male 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 Repeat I+D on 1/14, 1/16, 1/19, 1/25, 2/7, 2/17. Plan for bedside vac exchange 2/23    - PT- NWB right upper extremity  - Pain control  - DVT prophylaxis- ASA daily/SQH  - Appreciate ID recs: Unasyn in house, Augmentin 500mg QD on discharge until 2/21  - Appreciate ophthalmology recs: outpatient follow-up  - Appreciate med comangement  - Dispo: Possible DC 2/24 w/ VNS for vac changes

## 2023-02-22 NOTE — PROGRESS NOTE ADULT - ASSESSMENT
47 y.o. Male  w/ ESRD on HD, PVD s/p b/l BKA, DM2 c/b diabetic retinopathy, initially presenting to [facility] w/ R finger swelling and pain, found to have steal syndrome, transferred to [facility] for further management, s/p revision of AVF w/ vascular surgery on 1/13, s/p R 3rd finger amputation and repeated I&D with ortho (1/14, 1/16, 1/19, 1/25, 2/2, 2/7), s/p I&D and VAC change on 2/17.

## 2023-02-22 NOTE — PROGRESS NOTE ADULT - PROBLEM SELECTOR PLAN 1
Steal syndrome as complication of dialysis access, c/b R 3rd digit gangrene  - s/p RUE fistula repair on 1/13  - s/p R 3rd finger amputation and repeated I&D w/ ortho  - c/w pain management as per ortho  - c/w bowel regimen to prevent opioid induced constipation  - ID recs appreciated, wound cx polymicrobial (E. coli, strep, bacteroides)  - completed 6 weeks of IV unasyn as per ID; til 2/21   - s/p I&D and VAC change on Feb 17  - Surgery to check wound again on Thursday and decide need for repeat washout. If no washout can d/c planning after vac removed on Friday.

## 2023-02-23 LAB
GLUCOSE BLDC GLUCOMTR-MCNC: 128 MG/DL — HIGH (ref 70–99)
GLUCOSE BLDC GLUCOMTR-MCNC: 147 MG/DL — HIGH (ref 70–99)
GLUCOSE BLDC GLUCOMTR-MCNC: 174 MG/DL — HIGH (ref 70–99)
GLUCOSE BLDC GLUCOMTR-MCNC: 258 MG/DL — HIGH (ref 70–99)
SARS-COV-2 RNA SPEC QL NAA+PROBE: SIGNIFICANT CHANGE UP

## 2023-02-23 PROCEDURE — 99232 SBSQ HOSP IP/OBS MODERATE 35: CPT

## 2023-02-23 RX ADMIN — Medication 975 MILLIGRAM(S): at 02:40

## 2023-02-23 RX ADMIN — Medication 81 MILLIGRAM(S): at 11:50

## 2023-02-23 RX ADMIN — HEPARIN SODIUM 5000 UNIT(S): 5000 INJECTION INTRAVENOUS; SUBCUTANEOUS at 14:51

## 2023-02-23 RX ADMIN — INSULIN GLARGINE 4 UNIT(S): 100 INJECTION, SOLUTION SUBCUTANEOUS at 22:25

## 2023-02-23 RX ADMIN — Medication 975 MILLIGRAM(S): at 11:50

## 2023-02-23 RX ADMIN — Medication 1: at 11:48

## 2023-02-23 RX ADMIN — Medication 3: at 17:15

## 2023-02-23 RX ADMIN — AMPICILLIN SODIUM AND SULBACTAM SODIUM 200 GRAM(S): 250; 125 INJECTION, POWDER, FOR SUSPENSION INTRAMUSCULAR; INTRAVENOUS at 02:04

## 2023-02-23 RX ADMIN — DORZOLAMIDE HYDROCHLORIDE TIMOLOL MALEATE 1 DROP(S): 20; 5 SOLUTION/ DROPS OPHTHALMIC at 05:17

## 2023-02-23 RX ADMIN — Medication 975 MILLIGRAM(S): at 02:05

## 2023-02-23 RX ADMIN — Medication 975 MILLIGRAM(S): at 12:50

## 2023-02-23 RX ADMIN — HEPARIN SODIUM 5000 UNIT(S): 5000 INJECTION INTRAVENOUS; SUBCUTANEOUS at 05:17

## 2023-02-23 RX ADMIN — CHLORHEXIDINE GLUCONATE 1 APPLICATION(S): 213 SOLUTION TOPICAL at 05:21

## 2023-02-23 RX ADMIN — HEPARIN SODIUM 5000 UNIT(S): 5000 INJECTION INTRAVENOUS; SUBCUTANEOUS at 22:27

## 2023-02-23 RX ADMIN — DORZOLAMIDE HYDROCHLORIDE TIMOLOL MALEATE 1 DROP(S): 20; 5 SOLUTION/ DROPS OPHTHALMIC at 17:15

## 2023-02-23 NOTE — PROGRESS NOTE ADULT - ASSESSMENT
Patient is a 46 y/o male PMH DM2, Bilateral BKA, ESRD (on HD MWF), last dialyzed yesterday transferred from [facility] emergently for evaluation of evaluation of right finger swelling/pain. Patient reports history of right finger pain after he had a fall one year ago. Patient had a wound on her second/middle finger who he was seeing a hand surgeon for outpatient but unable ultimately to continue seeing due to insurance issues. Patient reports his middle finger developed increased swelling and became black in color about two weeks ago. Denies fevers. Patient transferred to [facility] for further evaluation and emergent OR. Patient received broad spectrum Abx of Zosyn/vanco/clindamycin at [place]. Interpretor phone used for translation with patient. ID# [id number]        f/u  blood and urine cx,serial lactate levels,monitor vitals closley,ivfs hydration,monitor urine output and renal profile,iv abx as per id cons  ampicillin/sulbactam  IVPB 3 Gram(s) IV Intermittent every 24 hours    esrd on hd mwf   Excess fluids and waste products will be removed from your blood; your electrolytes will be balanced; your blood pressure will be controlled.    BP monitoring,continue current antihypertensive meds, low salt diet,followup with PMD in 1-2 weeks      ANEMIA PLAN:  Anemia of chronic disease:  Well controlled by epo  H and H subtherapeutic .  We will continue epo aiming for a HCT of 32-36 %.   We will monitor Iron stores, B12 and RBC folate .  epoetin deidre-epbx (RETACRIT) Injectable 93985 Unit(s) IV Push     Accuchecks monitoring and insulin sliding scale coverage, no concentrated sweets diet, serial labs and f/up with PMD, monitor HB A 1 C every 3-4 mnth  piperacillin/tazobactam IVPB.. 3.375 Gram(s) IV Intermittent every 12 hours

## 2023-02-23 NOTE — PROGRESS NOTE ADULT - NUTRITIONAL ASSESSMENT
MEDICATIONS  (STANDING):  acetaminophen     Tablet .. 975 milliGRAM(s) Oral every 8 hours  ampicillin/sulbactam  IVPB 3 Gram(s) IV Intermittent every 24 hours  aspirin  chewable 81 milliGRAM(s) Oral daily  chlorhexidine 2% Cloths 1 Application(s) Topical <User Schedule>  dextrose 5%. 1000 milliLiter(s) (50 mL/Hr) IV Continuous <Continuous>  dextrose 5%. 1000 milliLiter(s) (100 mL/Hr) IV Continuous <Continuous>  dextrose 50% Injectable 25 Gram(s) IV Push once  dextrose 50% Injectable 12.5 Gram(s) IV Push once  dextrose 50% Injectable 25 Gram(s) IV Push once  dorzolamide 2%/timolol 0.5% Ophthalmic Solution 1 Drop(s) Both EYES two times a day  epoetin deidre-epbx (RETACRIT) Injectable 59522 Unit(s) IV Push <User Schedule>  glucagon  Injectable 1 milliGRAM(s) IntraMuscular once  heparin   Injectable 5000 Unit(s) SubCutaneous every 8 hours  insulin glargine Injectable (LANTUS) 4 Unit(s) SubCutaneous at bedtime  insulin lispro (ADMELOG) corrective regimen sliding scale   SubCutaneous three times a day before meals  insulin lispro (ADMELOG) corrective regimen sliding scale   SubCutaneous at bedtime  multivitamin 1 Tablet(s) Oral daily

## 2023-02-23 NOTE — PROGRESS NOTE ADULT - PROBLEM SELECTOR PLAN 3
DM2 on long term insulin therapy, c/b diabetic retinopathy  - c/w Lantus and sliding scale coverage, Lantus  -can Increase Lantus back to 5 Units  - A1C 7.1 in 1/10  - fasting lipid profile WNL; no benefit from statin therapy  - ophthalmology recs appreciated, severe proliferative diabetic retinopathy, c/w cosopt, otpt ophthalmology followup

## 2023-02-23 NOTE — PROGRESS NOTE ADULT - PROBLEM SELECTOR PLAN 1
Steal syndrome as complication of dialysis access, c/b R 3rd digit gangrene  - s/p RUE fistula repair on 1/13  - s/p R 3rd finger amputation and repeated I&D w/ ortho  - c/w pain management as per ortho  - c/w bowel regimen to prevent opioid induced constipation  - ID recs appreciated, wound cx polymicrobial (E. coli, strep, bacteroides)  - completed 6 weeks of IV unasyn on 2/21 as per ID.   - s/p I&D and VAC change on Feb 17  - Wound Vac changed again and D/C planning tomorrow.

## 2023-02-23 NOTE — PROGRESS NOTE ADULT - SUBJECTIVE AND OBJECTIVE BOX
Orthopedic Progress Note     S:  No acute events overnight, pain is well controlled.  Patient denies any chest pain, SOB, N/V, fevers/chills.    T(C): 36.9 (02-23-23 @ 05:18), Max: 36.9 (02-23-23 @ 05:18)  HR: 70 (02-23-23 @ 05:18) (64 - 80)  BP: 121/43 (02-23-23 @ 05:18) (114/42 - 135/64)  RR: 16 (02-23-23 @ 05:18) (16 - 18)  SpO2: 100% (02-23-23 @ 05:18) (100% - 100%)  Wt(kg): --I&O's Summary    22 Feb 2023 07:01  -  23 Feb 2023 07:00  --------------------------------------------------------  IN: 400 mL / OUT: 1900 mL / NET: -1500 mL        O:  Gen: NAD, resting comfortably  Resp: Nonlabored  RUE: vac in place with good seal, SILT, changed last night   Able to spontaneously move fingers

## 2023-02-23 NOTE — PROGRESS NOTE ADULT - SUBJECTIVE AND OBJECTIVE BOX
Patient is a 47y old  Male who presents with a chief complaint of esrd on hd (22 Feb 2023 19:57)      SUBJECTIVE / OVERNIGHT EVENTS:  Patient has no new complaints. Denies cp, SOB, abdominal pain, N/V/D     MEDICATIONS  (STANDING):  acetaminophen     Tablet .. 975 milliGRAM(s) Oral every 8 hours  aspirin  chewable 81 milliGRAM(s) Oral daily  chlorhexidine 2% Cloths 1 Application(s) Topical <User Schedule>  dextrose 5%. 1000 milliLiter(s) (50 mL/Hr) IV Continuous <Continuous>  dextrose 5%. 1000 milliLiter(s) (100 mL/Hr) IV Continuous <Continuous>  dextrose 50% Injectable 25 Gram(s) IV Push once  dextrose 50% Injectable 12.5 Gram(s) IV Push once  dextrose 50% Injectable 25 Gram(s) IV Push once  dorzolamide 2%/timolol 0.5% Ophthalmic Solution 1 Drop(s) Both EYES two times a day  epoetin deidre-epbx (RETACRIT) Injectable 50764 Unit(s) IV Push <User Schedule>  glucagon  Injectable 1 milliGRAM(s) IntraMuscular once  heparin   Injectable 5000 Unit(s) SubCutaneous every 8 hours  insulin glargine Injectable (LANTUS) 4 Unit(s) SubCutaneous at bedtime  insulin lispro (ADMELOG) corrective regimen sliding scale   SubCutaneous three times a day before meals  insulin lispro (ADMELOG) corrective regimen sliding scale   SubCutaneous at bedtime  multivitamin 1 Tablet(s) Oral daily    MEDICATIONS  (PRN):  bisacodyl Suppository 10 milliGRAM(s) Rectal daily PRN If no bowel movement  dextrose Oral Gel 15 Gram(s) Oral once PRN Blood Glucose LESS THAN 70 milliGRAM(s)/deciliter  lactulose Syrup 10 Gram(s) Oral daily PRN Constipation  oxyCODONE    IR 5 milliGRAM(s) Oral every 6 hours PRN Moderate Pain (4 - 6)      Vital Signs Last 24 Hrs  T(C): 36.9 (23 Feb 2023 05:18), Max: 36.9 (23 Feb 2023 05:18)  T(F): 98.4 (23 Feb 2023 05:18), Max: 98.4 (23 Feb 2023 05:18)  HR: 70 (23 Feb 2023 05:18) (64 - 80)  BP: 121/43 (23 Feb 2023 05:18) (114/42 - 135/64)  BP(mean): --  RR: 16 (23 Feb 2023 05:18) (16 - 18)  SpO2: 100% (23 Feb 2023 05:18) (100% - 100%)    Parameters below as of 23 Feb 2023 05:18  Patient On (Oxygen Delivery Method): room air      CAPILLARY BLOOD GLUCOSE      POCT Blood Glucose.: 147 mg/dL (23 Feb 2023 07:22)  POCT Blood Glucose.: 234 mg/dL (22 Feb 2023 21:36)  POCT Blood Glucose.: 121 mg/dL (22 Feb 2023 16:28)  POCT Blood Glucose.: 222 mg/dL (22 Feb 2023 11:27)    I&O's Summary    22 Feb 2023 07:01  -  23 Feb 2023 07:00  --------------------------------------------------------  IN: 400 mL / OUT: 1900 mL / NET: -1500 mL        PHYSICAL EXAM:  GENERAL: NAD, well-developed  HEAD:  Atraumatic, Normocephalic  EYES: EOMI, PERRLA, conjunctiva and sclera clear  NECK: Supple, No JVD  CHEST/LUNG: Clear to auscultation bilaterally; No wheeze  HEART: Regular rate and rhythm; No murmurs, rubs, or gallops  ABDOMEN: Soft, Nontender, Nondistended; Bowel sounds present  EXTREMITIES:  Right hand wound vac. 2+ Peripheral Pulses, No clubbing, cyanosis, or edema  PSYCH: AAOx3  NEUROLOGY: non-focal  SKIN: No rashes or lesions    LABS:                    RADIOLOGY & ADDITIONAL TESTS:    Imaging Personally Reviewed:    Consultant(s) Notes Reviewed:      Care Discussed with Consultants/Other Providers:

## 2023-02-23 NOTE — PROGRESS NOTE ADULT - SUBJECTIVE AND OBJECTIVE BOX
Patient is a 47y Male whom presented to the hospital with esrd on hd     PAST MEDICAL & SURGICAL HISTORY:      MEDICATIONS  (STANDING):  dextrose 5%. 1000 milliLiter(s) (100 mL/Hr) IV Continuous <Continuous>  dextrose 5%. 1000 milliLiter(s) (50 mL/Hr) IV Continuous <Continuous>  dextrose 50% Injectable 25 Gram(s) IV Push once  dextrose 50% Injectable 25 Gram(s) IV Push once  dextrose 50% Injectable 12.5 Gram(s) IV Push once  glucagon  Injectable 1 milliGRAM(s) IntraMuscular once  insulin lispro (ADMELOG) corrective regimen sliding scale   SubCutaneous every 6 hours  pantoprazole    Tablet 40 milliGRAM(s) Oral daily  polyethylene glycol 3350 17 Gram(s) Oral daily  senna 2 Tablet(s) Oral at bedtime      Allergies    No Known Allergies    Intolerances        SOCIAL HISTORY:  Denies ETOh,Smoking,     FAMILY HISTORY:      REVIEW OF SYSTEMS:    CONSTITUTIONAL: No weakness, fevers or chills  NECK: No pain or stiffness  RESPIRATORY: No cough, wheezing, hemoptysis; No shortness of breath  CARDIOVASCULAR: No chest pain or palpitations  GASTROINTESTINAL: No abdominal or epigastric pain. No nausea, vomiting,                                                                                CAPILLARY BLOOD GLUCOSE      POCT Blood Glucose.: 258 mg/dL (23 Feb 2023 17:07)  POCT Blood Glucose.: 174 mg/dL (23 Feb 2023 11:13)  POCT Blood Glucose.: 147 mg/dL (23 Feb 2023 07:22)  POCT Blood Glucose.: 234 mg/dL (22 Feb 2023 21:36)      Vital Signs Last 24 Hrs  T(C): 36.5 (23 Feb 2023 17:52), Max: 36.9 (23 Feb 2023 05:18)  T(F): 97.7 (23 Feb 2023 17:52), Max: 98.4 (23 Feb 2023 05:18)  HR: 73 (23 Feb 2023 17:52) (68 - 80)  BP: 150/62 (23 Feb 2023 17:52) (114/42 - 150/62)  BP(mean): --  RR: 17 (23 Feb 2023 17:52) (16 - 17)  SpO2: 100% (23 Feb 2023 17:52) (100% - 100%)    Parameters below as of 23 Feb 2023 17:52  Patient On (Oxygen Delivery Method): room air                         PHYSICAL EXAM:    Constitutional: NAD  HEENT: conjunctive   clear   Neck:  No JVD  Respiratory: CTAB  Cardiovascular: S1 and S2  Gastrointestinal: BS+, soft, NT/ND   right hand dressed. bilateral BKA   right arm AVF nontender

## 2023-02-23 NOTE — PROGRESS NOTE ADULT - ASSESSMENT
47y y/o Male 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 Repeat I+D on 1/14, 1/16, 1/19, 1/25, 2/7, 2/17. Plan for bedside vac exchange 2/23    - PT- NWB right upper extremity  - Pain control  - DVT prophylaxis- ASA daily/SQH  - Appreciate ID recs: Unasyn in house, Augmentin 500mg QD on discharge until 2/21 -- will contact ID to confirm OK to stop all abx versus dc on PO Augmentin   - Appreciate ophthalmology recs: outpatient follow-up  - Appreciate med comangement  - Dispo: Possible DC 2/24 w/ VNS for vac changes. Will have 90 day global period to follow up with Dr. Sin 47y y/o Male 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 Repeat I+D on 1/14, 1/16, 1/19, 1/25, 2/7, 2/17. Plan for bedside vac exchange 2/23    - PT- NWB right upper extremity  - Pain control  - DVT prophylaxis- ASA daily/SQH  - Appreciate ID recs: Unasyn in house, Augmentin 500mg QD on discharge until 2/21 - discussed with Dr. Alvarez who will evaluate patient later today, but recommends no further antibiotics needed at this point.   - DC Unasyn 2/23/2022  - Appreciate ophthalmology recs: outpatient follow-up  - Wound vac management- plan to DC with home vac and changes 2x/week by VNS  - Appreciate med co-mangement  - Dispo: Possible DC 2/24 w/ VNS for vac changes. Will have 90 day global period to follow up with Dr. Sin in outpatient office.
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
## 2023-02-23 NOTE — PROGRESS NOTE ADULT - SUBJECTIVE AND OBJECTIVE BOX
Follow Up: hand infection    Interval History/ROS: Afebrile, virtually no pain to the hand. Feels well. No diarrhea or cough.     Allergies  No Known Drug Allergies  [place] (Rash)        ANTIMICROBIALS:      OTHER MEDS:  acetaminophen     Tablet .. 975 milliGRAM(s) Oral every 8 hours  aspirin  chewable 81 milliGRAM(s) Oral daily  bisacodyl Suppository 10 milliGRAM(s) Rectal daily PRN  chlorhexidine 2% Cloths 1 Application(s) Topical <User Schedule>  dextrose 5%. 1000 milliLiter(s) IV Continuous <Continuous>  dextrose 5%. 1000 milliLiter(s) IV Continuous <Continuous>  dextrose 50% Injectable 25 Gram(s) IV Push once  dextrose 50% Injectable 12.5 Gram(s) IV Push once  dextrose 50% Injectable 25 Gram(s) IV Push once  dextrose Oral Gel 15 Gram(s) Oral once PRN  dorzolamide 2%/timolol 0.5% Ophthalmic Solution 1 Drop(s) Both EYES two times a day  epoetin deidre-epbx (RETACRIT) Injectable 41784 Unit(s) IV Push <User Schedule>  glucagon  Injectable 1 milliGRAM(s) IntraMuscular once  heparin   Injectable 5000 Unit(s) SubCutaneous every 8 hours  insulin glargine Injectable (LANTUS) 4 Unit(s) SubCutaneous at bedtime  insulin lispro (ADMELOG) corrective regimen sliding scale   SubCutaneous at bedtime  insulin lispro (ADMELOG) corrective regimen sliding scale   SubCutaneous three times a day before meals  lactulose Syrup 10 Gram(s) Oral daily PRN  multivitamin 1 Tablet(s) Oral daily  oxyCODONE    IR 5 milliGRAM(s) Oral every 6 hours PRN      Vital Signs Last 24 Hrs  T(C): 36.9 (23 Feb 2023 10:13), Max: 36.9 (23 Feb 2023 05:18)  T(F): 98.4 (23 Feb 2023 10:13), Max: 98.4 (23 Feb 2023 05:18)  HR: 68 (23 Feb 2023 10:13) (67 - 80)  BP: 132/64 (23 Feb 2023 10:13) (114/42 - 132/64)  BP(mean): --  RR: 16 (23 Feb 2023 10:13) (16 - 16)  SpO2: 100% (23 Feb 2023 10:13) (100% - 100%)    Parameters below as of 23 Feb 2023 10:13  Patient On (Oxygen Delivery Method): room air        Physical Exam:  General: non toxic, alert   Cardio: regular rate   Respiratory: nonlabored on room air   abd: nondistended  Musculoskeletal: right hand woundvac. bilateral BKA sites no wounds   vascular: no phlebitis   Skin: no rash                    MICROBIOLOGY:    RADIOLOGY:  Images below reviewed personally  Xray Chest 1 View- PORTABLE-Urgent (Xray Chest 1 View- PORTABLE-Urgent .) (02.16.23 @ 04:58)   No acute pulmonary disease.

## 2023-02-23 NOTE — PROGRESS NOTE ADULT - ASSESSMENT
47 y.o. Male  w/ ESRD on HD, PVD s/p b/l BKA, DM2 c/b diabetic retinopathy, initially presenting to [facility] [place] w/ R finger swelling and pain, found to have steal syndrome, transferred to [facility] for further management, s/p revision of AVF w/ vascular surgery on 1/13, s/p R 3rd finger amputation and repeated I&D with ortho (1/14, 1/16, 1/19, 1/25, 2/2, 2/7), s/p I&D and VAC change on 2/17.

## 2023-02-24 VITALS — SYSTOLIC BLOOD PRESSURE: 117 MMHG | DIASTOLIC BLOOD PRESSURE: 52 MMHG

## 2023-02-24 LAB
GLUCOSE BLDC GLUCOMTR-MCNC: 131 MG/DL — HIGH (ref 70–99)
GLUCOSE BLDC GLUCOMTR-MCNC: 139 MG/DL — HIGH (ref 70–99)
GLUCOSE BLDC GLUCOMTR-MCNC: 143 MG/DL — HIGH (ref 70–99)
GLUCOSE BLDC GLUCOMTR-MCNC: 158 MG/DL — HIGH (ref 70–99)
GLUCOSE BLDC GLUCOMTR-MCNC: 193 MG/DL — HIGH (ref 70–99)
HAV IGM SER-ACNC: SIGNIFICANT CHANGE UP
HBV CORE IGM SER-ACNC: SIGNIFICANT CHANGE UP
HBV SURFACE AG SER-ACNC: SIGNIFICANT CHANGE UP
HCV AB S/CO SERPL IA: 0.23 S/CO — SIGNIFICANT CHANGE UP (ref 0–0.99)
HCV AB SERPL-IMP: SIGNIFICANT CHANGE UP

## 2023-02-24 PROCEDURE — 99232 SBSQ HOSP IP/OBS MODERATE 35: CPT

## 2023-02-24 RX ORDER — INSULIN GLARGINE 100 [IU]/ML
4 INJECTION, SOLUTION SUBCUTANEOUS
Qty: 1 | Refills: 0
Start: 2023-02-24

## 2023-02-24 RX ORDER — OXYCODONE HYDROCHLORIDE 5 MG/1
10 TABLET ORAL ONCE
Refills: 0 | Status: DISCONTINUED | OUTPATIENT
Start: 2023-02-24 | End: 2023-02-24

## 2023-02-24 RX ADMIN — OXYCODONE HYDROCHLORIDE 10 MILLIGRAM(S): 5 TABLET ORAL at 14:51

## 2023-02-24 RX ADMIN — Medication 975 MILLIGRAM(S): at 02:15

## 2023-02-24 RX ADMIN — HEPARIN SODIUM 5000 UNIT(S): 5000 INJECTION INTRAVENOUS; SUBCUTANEOUS at 05:14

## 2023-02-24 RX ADMIN — OXYCODONE HYDROCHLORIDE 10 MILLIGRAM(S): 5 TABLET ORAL at 15:51

## 2023-02-24 RX ADMIN — DORZOLAMIDE HYDROCHLORIDE TIMOLOL MALEATE 1 DROP(S): 20; 5 SOLUTION/ DROPS OPHTHALMIC at 05:14

## 2023-02-24 RX ADMIN — ERYTHROPOIETIN 10000 UNIT(S): 10000 INJECTION, SOLUTION INTRAVENOUS; SUBCUTANEOUS at 11:23

## 2023-02-24 RX ADMIN — Medication 81 MILLIGRAM(S): at 14:52

## 2023-02-24 RX ADMIN — CHLORHEXIDINE GLUCONATE 1 APPLICATION(S): 213 SOLUTION TOPICAL at 05:17

## 2023-02-24 RX ADMIN — Medication 1: at 17:15

## 2023-02-24 NOTE — PROGRESS NOTE ADULT - PROBLEM SELECTOR PLAN 1
Steal syndrome as complication of dialysis access, c/b R 3rd digit gangrene  - s/p RUE fistula repair on 1/13  - s/p R 3rd finger amputation and repeated I&D w/ ortho  - c/w pain management as per ortho  - c/w bowel regimen to prevent opioid induced constipation  - ID recs appreciated, wound cx polymicrobial (E. coli, strep, bacteroides)  - completed 6 weeks of IV unasyn on 2/21 as per ID.   - s/p I&D and VAC change on Feb 17  - Wound Vac changed again and D/C planning tomorrow. Steal syndrome as complication of dialysis access, c/b R 3rd digit gangrene  - s/p RUE fistula repair on 1/13  - s/p R 3rd finger amputation and repeated I&D w/ ortho  - c/w pain management as per ortho  - c/w bowel regimen to prevent opioid induced constipation  - ID recs appreciated, wound cx polymicrobial (E. coli, strep, bacteroides)  - completed 6 weeks of IV unasyn on 2/21 as per ID.   - s/p I&D and VAC change on Feb 17  - Wound Vac changed again on 2/23   -d/c planning as per primary team.

## 2023-02-24 NOTE — PROGRESS NOTE ADULT - ASSESSMENT
Patient is a 46 y/o male PMH DM2, Bilateral BKA, ESRD (on HD MWF), last dialyzed yesterday transferred from [facility] emergently for evaluation of evaluation of right finger swelling/pain. Patient reports history of right finger pain after he had a fall one year ago. Patient had a wound on her second/middle finger who he was seeing a hand surgeon for outpatient but unable ultimately to continue seeing due to insurance issues. Patient reports his middle finger developed increased swelling and became black in color about two weeks ago. Denies fevers. Patient transferred to [facility] for further evaluation and emergent OR. Patient received broad spectrum Abx of Zosyn/vanco/clindamycin at [facility]. Interpretor phone used for translation with patient. ID# [id number]        f/u  blood and urine cx,serial lactate levels,monitor vitals closley,ivfs hydration,monitor urine output and renal profile,iv abx as per id cons  ampicillin/sulbactam  IVPB 3 Gram(s) IV Intermittent every 24 hours    esrd on hd mwf   Excess fluids and waste products will be removed from your blood; your electrolytes will be balanced; your blood pressure will be controlled.    BP monitoring,continue current antihypertensive meds, low salt diet,followup with PMD in 1-2 weeks      ANEMIA PLAN:  Anemia of chronic disease:  Well controlled by epo  H and H subtherapeutic .  We will continue epo aiming for a HCT of 32-36 %.   We will monitor Iron stores, B12 and RBC folate .  epoetin deidre-epbx (RETACRIT) Injectable 84522 Unit(s) IV Push     Accuchecks monitoring and insulin sliding scale coverage, no concentrated sweets diet, serial labs and f/up with PMD, monitor HB A 1 C every 3-4 mnth  piperacillin/tazobactam IVPB.. 3.375 Gram(s) IV Intermittent every 12 hours

## 2023-02-24 NOTE — PROGRESS NOTE ADULT - PROBLEM SELECTOR PROBLEM 5
Prophylactic measure
Prophylactic measure
ESRD on dialysis
ESRD on dialysis
Prophylactic measure
ESRD on dialysis
Impaired vision
Prophylactic measure
Impaired vision
Prophylactic measure
Prophylactic measure
Impaired vision
Prophylactic measure
Prophylactic measure
Impaired vision
Prophylactic measure
ESRD on dialysis
Prophylactic measure
ESRD on dialysis
Prophylactic measure
Impaired vision
Prophylactic measure
ESRD on dialysis
Impaired vision

## 2023-02-24 NOTE — PROGRESS NOTE ADULT - SUBJECTIVE AND OBJECTIVE BOX
Patient is a 47y old  Male who presents with a chief complaint of esrd on hd (23 Feb 2023 19:05)      SUBJECTIVE / OVERNIGHT EVENTS:    MEDICATIONS  (STANDING):  acetaminophen     Tablet .. 975 milliGRAM(s) Oral every 8 hours  aspirin  chewable 81 milliGRAM(s) Oral daily  chlorhexidine 2% Cloths 1 Application(s) Topical <User Schedule>  dextrose 5%. 1000 milliLiter(s) (50 mL/Hr) IV Continuous <Continuous>  dextrose 5%. 1000 milliLiter(s) (100 mL/Hr) IV Continuous <Continuous>  dextrose 50% Injectable 25 Gram(s) IV Push once  dextrose 50% Injectable 12.5 Gram(s) IV Push once  dextrose 50% Injectable 25 Gram(s) IV Push once  dorzolamide 2%/timolol 0.5% Ophthalmic Solution 1 Drop(s) Both EYES two times a day  epoetin deidre-epbx (RETACRIT) Injectable 57054 Unit(s) IV Push <User Schedule>  glucagon  Injectable 1 milliGRAM(s) IntraMuscular once  heparin   Injectable 5000 Unit(s) SubCutaneous every 8 hours  insulin glargine Injectable (LANTUS) 4 Unit(s) SubCutaneous at bedtime  insulin lispro (ADMELOG) corrective regimen sliding scale   SubCutaneous three times a day before meals  insulin lispro (ADMELOG) corrective regimen sliding scale   SubCutaneous at bedtime  multivitamin 1 Tablet(s) Oral daily    MEDICATIONS  (PRN):  bisacodyl Suppository 10 milliGRAM(s) Rectal daily PRN If no bowel movement  dextrose Oral Gel 15 Gram(s) Oral once PRN Blood Glucose LESS THAN 70 milliGRAM(s)/deciliter  lactulose Syrup 10 Gram(s) Oral daily PRN Constipation  oxyCODONE    IR 5 milliGRAM(s) Oral every 6 hours PRN Moderate Pain (4 - 6)      Vital Signs Last 24 Hrs  T(C): 36.6 (24 Feb 2023 05:15), Max: 36.9 (23 Feb 2023 10:13)  T(F): 97.9 (24 Feb 2023 05:15), Max: 98.4 (23 Feb 2023 10:13)  HR: 66 (24 Feb 2023 05:15) (66 - 73)  BP: 131/46 (24 Feb 2023 05:15) (131/46 - 150/62)  BP(mean): --  RR: 16 (24 Feb 2023 05:15) (16 - 17)  SpO2: 100% (24 Feb 2023 05:15) (98% - 100%)    Parameters below as of 24 Feb 2023 05:15  Patient On (Oxygen Delivery Method): room air      CAPILLARY BLOOD GLUCOSE      POCT Blood Glucose.: 139 mg/dL (24 Feb 2023 07:42)  POCT Blood Glucose.: 128 mg/dL (23 Feb 2023 21:21)  POCT Blood Glucose.: 258 mg/dL (23 Feb 2023 17:07)  POCT Blood Glucose.: 174 mg/dL (23 Feb 2023 11:13)    I&O's Summary      PHYSICAL EXAM:  GENERAL: NAD, well-developed  HEAD:  Atraumatic, Normocephalic  EYES: EOMI, PERRLA, conjunctiva and sclera clear  NECK: Supple, No JVD  CHEST/LUNG: Clear to auscultation bilaterally; No wheeze  HEART: Regular rate and rhythm; No murmurs, rubs, or gallops  ABDOMEN: Soft, Nontender, Nondistended; Bowel sounds present  EXTREMITIES:  2+ Peripheral Pulses, No clubbing, cyanosis, or edema  PSYCH: AAOx3  NEUROLOGY: non-focal  SKIN: No rashes or lesions    LABS:                    RADIOLOGY & ADDITIONAL TESTS:    Imaging Personally Reviewed:    Consultant(s) Notes Reviewed:      Care Discussed with Consultants/Other Providers:   Patient is a 47y old  Male who presents with a chief complaint of esrd on hd (23 Feb 2023 19:05)      SUBJECTIVE / OVERNIGHT EVENTS:  Patient has no new complaints. Denies cp, SOB, abdominal pain, N/V/D     MEDICATIONS  (STANDING):  acetaminophen     Tablet .. 975 milliGRAM(s) Oral every 8 hours  aspirin  chewable 81 milliGRAM(s) Oral daily  chlorhexidine 2% Cloths 1 Application(s) Topical <User Schedule>  dextrose 5%. 1000 milliLiter(s) (50 mL/Hr) IV Continuous <Continuous>  dextrose 5%. 1000 milliLiter(s) (100 mL/Hr) IV Continuous <Continuous>  dextrose 50% Injectable 25 Gram(s) IV Push once  dextrose 50% Injectable 12.5 Gram(s) IV Push once  dextrose 50% Injectable 25 Gram(s) IV Push once  dorzolamide 2%/timolol 0.5% Ophthalmic Solution 1 Drop(s) Both EYES two times a day  epoetin deidre-epbx (RETACRIT) Injectable 07525 Unit(s) IV Push <User Schedule>  glucagon  Injectable 1 milliGRAM(s) IntraMuscular once  heparin   Injectable 5000 Unit(s) SubCutaneous every 8 hours  insulin glargine Injectable (LANTUS) 4 Unit(s) SubCutaneous at bedtime  insulin lispro (ADMELOG) corrective regimen sliding scale   SubCutaneous three times a day before meals  insulin lispro (ADMELOG) corrective regimen sliding scale   SubCutaneous at bedtime  multivitamin 1 Tablet(s) Oral daily    MEDICATIONS  (PRN):  bisacodyl Suppository 10 milliGRAM(s) Rectal daily PRN If no bowel movement  dextrose Oral Gel 15 Gram(s) Oral once PRN Blood Glucose LESS THAN 70 milliGRAM(s)/deciliter  lactulose Syrup 10 Gram(s) Oral daily PRN Constipation  oxyCODONE    IR 5 milliGRAM(s) Oral every 6 hours PRN Moderate Pain (4 - 6)      Vital Signs Last 24 Hrs  T(C): 36.6 (24 Feb 2023 05:15), Max: 36.9 (23 Feb 2023 10:13)  T(F): 97.9 (24 Feb 2023 05:15), Max: 98.4 (23 Feb 2023 10:13)  HR: 66 (24 Feb 2023 05:15) (66 - 73)  BP: 131/46 (24 Feb 2023 05:15) (131/46 - 150/62)  BP(mean): --  RR: 16 (24 Feb 2023 05:15) (16 - 17)  SpO2: 100% (24 Feb 2023 05:15) (98% - 100%)    Parameters below as of 24 Feb 2023 05:15  Patient On (Oxygen Delivery Method): room air      CAPILLARY BLOOD GLUCOSE      POCT Blood Glucose.: 139 mg/dL (24 Feb 2023 07:42)  POCT Blood Glucose.: 128 mg/dL (23 Feb 2023 21:21)  POCT Blood Glucose.: 258 mg/dL (23 Feb 2023 17:07)  POCT Blood Glucose.: 174 mg/dL (23 Feb 2023 11:13)    I&O's Summary      PHYSICAL EXAM:  GENERAL: NAD, well-developed  HEAD:  Atraumatic, Normocephalic  EYES: EOMI, PERRLA, conjunctiva and sclera clear  NECK: Supple, No JVD  CHEST/LUNG: Clear to auscultation bilaterally; No wheeze  HEART: Regular rate and rhythm; No murmurs, rubs, or gallops  ABDOMEN: Soft, Nontender, Nondistended; Bowel sounds present  EXTREMITIES:  Right hand wound vac. 2+ Peripheral Pulses, No clubbing, cyanosis, or edema  PSYCH: AAOx3  NEUROLOGY: non-focal  SKIN: No rashes or lesions    LABS:                    RADIOLOGY & ADDITIONAL TESTS:    Imaging Personally Reviewed:    Consultant(s) Notes Reviewed:      Care Discussed with Consultants/Other Providers:

## 2023-02-24 NOTE — PROGRESS NOTE ADULT - NUTRITIONAL ASSESSMENT
MEDICATIONS  (STANDING):  acetaminophen     Tablet .. 975 milliGRAM(s) Oral every 8 hours  ampicillin/sulbactam  IVPB 3 Gram(s) IV Intermittent every 24 hours  aspirin  chewable 81 milliGRAM(s) Oral daily  chlorhexidine 2% Cloths 1 Application(s) Topical <User Schedule>  dextrose 5%. 1000 milliLiter(s) (50 mL/Hr) IV Continuous <Continuous>  dextrose 5%. 1000 milliLiter(s) (100 mL/Hr) IV Continuous <Continuous>  dextrose 50% Injectable 25 Gram(s) IV Push once  dextrose 50% Injectable 12.5 Gram(s) IV Push once  dextrose 50% Injectable 25 Gram(s) IV Push once  dorzolamide 2%/timolol 0.5% Ophthalmic Solution 1 Drop(s) Both EYES two times a day  epoetin deidre-epbx (RETACRIT) Injectable 72845 Unit(s) IV Push <User Schedule>  glucagon  Injectable 1 milliGRAM(s) IntraMuscular once  heparin   Injectable 5000 Unit(s) SubCutaneous every 8 hours  insulin glargine Injectable (LANTUS) 4 Unit(s) SubCutaneous at bedtime  insulin lispro (ADMELOG) corrective regimen sliding scale   SubCutaneous three times a day before meals  insulin lispro (ADMELOG) corrective regimen sliding scale   SubCutaneous at bedtime  multivitamin 1 Tablet(s) Oral daily

## 2023-02-24 NOTE — PROGRESS NOTE ADULT - SUBJECTIVE AND OBJECTIVE BOX
Patient is a 47y Male whom presented to the hospital with esrd on hd     PAST MEDICAL & SURGICAL HISTORY:      MEDICATIONS  (STANDING):  dextrose 5%. 1000 milliLiter(s) (100 mL/Hr) IV Continuous <Continuous>  dextrose 5%. 1000 milliLiter(s) (50 mL/Hr) IV Continuous <Continuous>  dextrose 50% Injectable 25 Gram(s) IV Push once  dextrose 50% Injectable 25 Gram(s) IV Push once  dextrose 50% Injectable 12.5 Gram(s) IV Push once  glucagon  Injectable 1 milliGRAM(s) IntraMuscular once  insulin lispro (ADMELOG) corrective regimen sliding scale   SubCutaneous every 6 hours  pantoprazole    Tablet 40 milliGRAM(s) Oral daily  polyethylene glycol 3350 17 Gram(s) Oral daily  senna 2 Tablet(s) Oral at bedtime      Allergies    No Known Allergies    Intolerances        SOCIAL HISTORY:  Denies ETOh,Smoking,     FAMILY HISTORY:      REVIEW OF SYSTEMS:    CONSTITUTIONAL: No weakness, fevers or chills  NECK: No pain or stiffness  RESPIRATORY: No cough, wheezing, hemoptysis; No shortness of breath  CARDIOVASCULAR: No chest pain or palpitations  GASTROINTESTINAL: No abdominal or epigastric pain. No nausea, vomiting,                                                                                        CAPILLARY BLOOD GLUCOSE      POCT Blood Glucose.: 193 mg/dL (24 Feb 2023 16:35)  POCT Blood Glucose.: 131 mg/dL (24 Feb 2023 13:41)  POCT Blood Glucose.: 143 mg/dL (24 Feb 2023 12:22)  POCT Blood Glucose.: 158 mg/dL (24 Feb 2023 10:27)  POCT Blood Glucose.: 139 mg/dL (24 Feb 2023 07:42)  POCT Blood Glucose.: 128 mg/dL (23 Feb 2023 21:21)      Vital Signs Last 24 Hrs  T(C): 36.8 (24 Feb 2023 17:59), Max: 37 (24 Feb 2023 14:00)  T(F): 98.2 (24 Feb 2023 17:59), Max: 98.6 (24 Feb 2023 14:00)  HR: 77 (24 Feb 2023 17:59) (66 - 77)  BP: 117/52 (24 Feb 2023 18:01) (110/36 - 148/52)  BP(mean): --  RR: 17 (24 Feb 2023 17:59) (16 - 17)  SpO2: 100% (24 Feb 2023 17:59) (98% - 100%)    Parameters below as of 24 Feb 2023 17:59  Patient On (Oxygen Delivery Method): room air                             PHYSICAL EXAM:    Constitutional: NAD  HEENT: conjunctive   clear   Neck:  No JVD  Respiratory: CTAB  Cardiovascular: S1 and S2  Gastrointestinal: BS+, soft, NT/ND   right hand dressed. bilateral BKA   right arm AVF nontender

## 2023-02-24 NOTE — PROGRESS NOTE ADULT - SUBJECTIVE AND OBJECTIVE BOX
Orthopedic Progress Note     S:  No acute events overnight, pain is well controlled.  Patient denies any chest pain, SOB, N/V, fevers/chills.    T(C): 36.9 (02-23-23 @ 05:18), Max: 36.9 (02-23-23 @ 05:18)  HR: 70 (02-23-23 @ 05:18) (64 - 80)  BP: 121/43 (02-23-23 @ 05:18) (114/42 - 135/64)  RR: 16 (02-23-23 @ 05:18) (16 - 18)  SpO2: 100% (02-23-23 @ 05:18) (100% - 100%)  Wt(kg): --I&O's Summary    22 Feb 2023 07:01  -  23 Feb 2023 07:00  --------------------------------------------------------  IN: 400 mL / OUT: 1900 mL / NET: -1500 mL        O:  Gen: NAD, resting comfortably  Resp: Nonlabored  RUE: vac in place with good seal, SILT, changed last night   Able to spontaneously move fingers                 Assessment and Plan:   · Assessment	  47y y/o Male 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 Repeat I+D on 1/14, 1/16, 1/19, 1/25, 2/7, 2/17, 2,22    - PT- NWB right upper extremity  - Pain control  - DVT prophylaxis- ASA daily/SQH  - Appreciate ID recs: no further antibiotics needed at this point.   - Appreciate ophthalmology recs: outpatient follow-up  - Wound vac management- plan to DC with home vac and changes 2x/week by VNS  - Appreciate med co-mangement  - Dispo: Possible DC 2/24 w/ VNS for vac changes. Will have 90 day global period to follow up with Dr. Sin in outpatient office.

## 2023-02-24 NOTE — PROGRESS NOTE ADULT - REASON FOR ADMISSION
esrd on hd
esrd on hd
s/p Right 3rd finger amputation at metacarpal head, hand I&D and CTR
Steal syndrome
esrd on hd
Steal syndrome
Steal syndrome
esrd on hd
right hand steal syndrome
3rd finger amputation
steal syndrome
hand steal syndrome

## 2023-02-24 NOTE — PROGRESS NOTE ADULT - ASSESSMENT
47 y.o. Male  w/ ESRD on HD, PVD s/p b/l BKA, DM2 c/b diabetic retinopathy, initially presenting to [facility] w/ R finger swelling and pain, found to have steal syndrome, transferred to [facility] for further management, s/p revision of AVF w/ vascular surgery on 1/13, s/p R 3rd finger amputation and repeated I&D with ortho (1/14, 1/16, 1/19, 1/25, 2/2, 2/7), s/p I&D and VAC change on 2/17. 47 y.o. Male  w/ ESRD on HD, PVD s/p b/l BKA, DM2 c/b diabetic retinopathy, initially presenting to [facility] w/ R finger swelling and pain, found to have steal syndrome, transferred to [facility] for further management, s/p revision of AVF w/ vascular surgery on 1/13, s/p R 3rd finger amputation and repeated I&D with ortho (1/14, 1/16, 1/19, 1/25, 2/2, 2/7), s/p I&D and VAC change on 2/17, 2/23.

## 2023-02-24 NOTE — PROGRESS NOTE ADULT - PROVIDER SPECIALTY LIST ADULT
Cardiology
Hospitalist
Infectious Disease
Infectious Disease
Nephrology
Ophthalmology
Orthopedics
SICU
Vascular Surgery
Anesthesia
Hospitalist
Infectious Disease
Nephrology
Orthopedics
SICU
Vascular Surgery
Hospitalist
Hospitalist
Infectious Disease
Nephrology
Orthopedics
SICU
Vascular Surgery
Hospitalist
Hospitalist
Nephrology
SICU
Hospitalist
Nephrology
Nephrology
Internal Medicine
Hospitalist
Internal Medicine
Hospitalist
Internal Medicine

## 2023-02-25 LAB
CULTURE RESULTS: SIGNIFICANT CHANGE UP
SPECIMEN SOURCE: SIGNIFICANT CHANGE UP

## 2023-02-27 RX ORDER — OXYCODONE HYDROCHLORIDE 5 MG/1
1 TABLET ORAL
Qty: 20 | Refills: 0
Start: 2023-02-27

## 2023-02-28 PROBLEM — Z00.00 ENCOUNTER FOR PREVENTIVE HEALTH EXAMINATION: Status: ACTIVE | Noted: 2023-02-28

## 2023-03-05 ENCOUNTER — TRANSCRIPTION ENCOUNTER (OUTPATIENT)
Age: 48
End: 2023-03-05

## 2023-03-06 ENCOUNTER — APPOINTMENT (OUTPATIENT)
Dept: ORTHOPEDIC SURGERY | Facility: CLINIC | Age: 48
End: 2023-03-06
Payer: SELF-PAY

## 2023-03-06 ENCOUNTER — APPOINTMENT (OUTPATIENT)
Dept: ORTHOPEDIC SURGERY | Facility: CLINIC | Age: 48
End: 2023-03-06

## 2023-03-06 ENCOUNTER — INPATIENT (INPATIENT)
Facility: HOSPITAL | Age: 48
LOS: 38 days | Discharge: HOME | End: 2023-04-14
Attending: ORTHOPAEDIC SURGERY | Admitting: ORTHOPAEDIC SURGERY
Payer: MEDICAID

## 2023-03-06 VITALS
HEART RATE: 68 BPM | DIASTOLIC BLOOD PRESSURE: 105 MMHG | HEIGHT: 67 IN | SYSTOLIC BLOOD PRESSURE: 161 MMHG | OXYGEN SATURATION: 100 % | TEMPERATURE: 98 F | RESPIRATION RATE: 16 BRPM

## 2023-03-06 DIAGNOSIS — Z01.818 ENCOUNTER FOR OTHER PREPROCEDURAL EXAMINATION: ICD-10-CM

## 2023-03-06 DIAGNOSIS — L02.511 CUTANEOUS ABSCESS OF RIGHT HAND: ICD-10-CM

## 2023-03-06 DIAGNOSIS — E11.9 TYPE 2 DIABETES MELLITUS WITHOUT COMPLICATIONS: ICD-10-CM

## 2023-03-06 DIAGNOSIS — I77.0 ARTERIOVENOUS FISTULA, ACQUIRED: Chronic | ICD-10-CM

## 2023-03-06 DIAGNOSIS — N18.6 END STAGE RENAL DISEASE: ICD-10-CM

## 2023-03-06 DIAGNOSIS — Z89.511 ACQUIRED ABSENCE OF RIGHT LEG BELOW KNEE: Chronic | ICD-10-CM

## 2023-03-06 DIAGNOSIS — L08.9 LOCAL INFECTION OF THE SKIN AND SUBCUTANEOUS TISSUE, UNSPECIFIED: ICD-10-CM

## 2023-03-06 DIAGNOSIS — Z29.9 ENCOUNTER FOR PROPHYLACTIC MEASURES, UNSPECIFIED: ICD-10-CM

## 2023-03-06 LAB
ALBUMIN SERPL ELPH-MCNC: 3.6 G/DL — SIGNIFICANT CHANGE UP (ref 3.3–5)
ALP SERPL-CCNC: 108 U/L — SIGNIFICANT CHANGE UP (ref 40–120)
ALT FLD-CCNC: 16 U/L — SIGNIFICANT CHANGE UP (ref 4–41)
ANION GAP SERPL CALC-SCNC: 18 MMOL/L — HIGH (ref 7–14)
ANION GAP SERPL CALC-SCNC: 18 MMOL/L — HIGH (ref 7–14)
APTT BLD: 30.5 SEC — SIGNIFICANT CHANGE UP (ref 27–36.3)
AST SERPL-CCNC: 37 U/L — SIGNIFICANT CHANGE UP (ref 4–40)
BASOPHILS # BLD AUTO: 0.12 K/UL — SIGNIFICANT CHANGE UP (ref 0–0.2)
BASOPHILS NFR BLD AUTO: 1.2 % — SIGNIFICANT CHANGE UP (ref 0–2)
BILIRUB SERPL-MCNC: 0.3 MG/DL — SIGNIFICANT CHANGE UP (ref 0.2–1.2)
BLD GP AB SCN SERPL QL: POSITIVE — SIGNIFICANT CHANGE UP
BLOOD GAS VENOUS COMPREHENSIVE RESULT: SIGNIFICANT CHANGE UP
BUN SERPL-MCNC: 70 MG/DL — HIGH (ref 7–23)
BUN SERPL-MCNC: 72 MG/DL — HIGH (ref 7–23)
CALCIUM SERPL-MCNC: 8.8 MG/DL — SIGNIFICANT CHANGE UP (ref 8.4–10.5)
CALCIUM SERPL-MCNC: 8.9 MG/DL — SIGNIFICANT CHANGE UP (ref 8.4–10.5)
CHLORIDE SERPL-SCNC: 94 MMOL/L — LOW (ref 98–107)
CHLORIDE SERPL-SCNC: 97 MMOL/L — LOW (ref 98–107)
CO2 SERPL-SCNC: 19 MMOL/L — LOW (ref 22–31)
CO2 SERPL-SCNC: 22 MMOL/L — SIGNIFICANT CHANGE UP (ref 22–31)
CREAT SERPL-MCNC: 10.03 MG/DL — HIGH (ref 0.5–1.3)
CREAT SERPL-MCNC: 9.65 MG/DL — HIGH (ref 0.5–1.3)
CRP SERPL-MCNC: 23.9 MG/L — HIGH
EGFR: 6 ML/MIN/1.73M2 — LOW
EGFR: 6 ML/MIN/1.73M2 — LOW
EOSINOPHIL # BLD AUTO: 0.24 K/UL — SIGNIFICANT CHANGE UP (ref 0–0.5)
EOSINOPHIL NFR BLD AUTO: 2.5 % — SIGNIFICANT CHANGE UP (ref 0–6)
ERYTHROCYTE [SEDIMENTATION RATE] IN BLOOD: 44 MM/HR — HIGH (ref 1–15)
GLUCOSE SERPL-MCNC: 158 MG/DL — HIGH (ref 70–99)
GLUCOSE SERPL-MCNC: 177 MG/DL — HIGH (ref 70–99)
HCT VFR BLD CALC: 36.1 % — LOW (ref 39–50)
HGB BLD-MCNC: 10.9 G/DL — LOW (ref 13–17)
IANC: 5.96 K/UL — SIGNIFICANT CHANGE UP (ref 1.8–7.4)
IMM GRANULOCYTES NFR BLD AUTO: 0.3 % — SIGNIFICANT CHANGE UP (ref 0–0.9)
INR BLD: 1.13 RATIO — SIGNIFICANT CHANGE UP (ref 0.88–1.16)
LYMPHOCYTES # BLD AUTO: 2.93 K/UL — SIGNIFICANT CHANGE UP (ref 1–3.3)
LYMPHOCYTES # BLD AUTO: 30 % — SIGNIFICANT CHANGE UP (ref 13–44)
MAGNESIUM SERPL-MCNC: 2.5 MG/DL — SIGNIFICANT CHANGE UP (ref 1.6–2.6)
MCHC RBC-ENTMCNC: 26.7 PG — LOW (ref 27–34)
MCHC RBC-ENTMCNC: 30.2 GM/DL — LOW (ref 32–36)
MCV RBC AUTO: 88.3 FL — SIGNIFICANT CHANGE UP (ref 80–100)
MONOCYTES # BLD AUTO: 0.49 K/UL — SIGNIFICANT CHANGE UP (ref 0–0.9)
MONOCYTES NFR BLD AUTO: 5 % — SIGNIFICANT CHANGE UP (ref 2–14)
NEUTROPHILS # BLD AUTO: 5.96 K/UL — SIGNIFICANT CHANGE UP (ref 1.8–7.4)
NEUTROPHILS NFR BLD AUTO: 61 % — SIGNIFICANT CHANGE UP (ref 43–77)
NRBC # BLD: 0 /100 WBCS — SIGNIFICANT CHANGE UP (ref 0–0)
NRBC # FLD: 0 K/UL — SIGNIFICANT CHANGE UP (ref 0–0)
PLATELET # BLD AUTO: 534 K/UL — HIGH (ref 150–400)
POTASSIUM SERPL-MCNC: 5 MMOL/L — SIGNIFICANT CHANGE UP (ref 3.5–5.3)
POTASSIUM SERPL-MCNC: SIGNIFICANT CHANGE UP MMOL/L (ref 3.5–5.3)
POTASSIUM SERPL-SCNC: 5 MMOL/L — SIGNIFICANT CHANGE UP (ref 3.5–5.3)
POTASSIUM SERPL-SCNC: SIGNIFICANT CHANGE UP MMOL/L (ref 3.5–5.3)
PROT SERPL-MCNC: 8 G/DL — SIGNIFICANT CHANGE UP (ref 6–8.3)
PROTHROM AB SERPL-ACNC: 13.1 SEC — SIGNIFICANT CHANGE UP (ref 10.5–13.4)
RBC # BLD: 4.09 M/UL — LOW (ref 4.2–5.8)
RBC # FLD: 15 % — HIGH (ref 10.3–14.5)
RH IG SCN BLD-IMP: POSITIVE — SIGNIFICANT CHANGE UP
SARS-COV-2 RNA SPEC QL NAA+PROBE: DETECTED
SODIUM SERPL-SCNC: 131 MMOL/L — LOW (ref 135–145)
SODIUM SERPL-SCNC: 137 MMOL/L — SIGNIFICANT CHANGE UP (ref 135–145)
WBC # BLD: 9.77 K/UL — SIGNIFICANT CHANGE UP (ref 3.8–10.5)
WBC # FLD AUTO: 9.77 K/UL — SIGNIFICANT CHANGE UP (ref 3.8–10.5)

## 2023-03-06 PROCEDURE — 73130 X-RAY EXAM OF HAND: CPT | Mod: 26,RT

## 2023-03-06 PROCEDURE — 99024 POSTOP FOLLOW-UP VISIT: CPT

## 2023-03-06 PROCEDURE — 71046 X-RAY EXAM CHEST 2 VIEWS: CPT | Mod: 26

## 2023-03-06 PROCEDURE — 73090 X-RAY EXAM OF FOREARM: CPT | Mod: 26,RT

## 2023-03-06 PROCEDURE — 11043 DBRDMT MUSC&/FSCA 1ST 20/<: CPT | Mod: 78,RT

## 2023-03-06 PROCEDURE — 99285 EMERGENCY DEPT VISIT HI MDM: CPT

## 2023-03-06 PROCEDURE — 99223 1ST HOSP IP/OBS HIGH 75: CPT

## 2023-03-06 RX ORDER — SODIUM CHLORIDE 9 MG/ML
1000 INJECTION, SOLUTION INTRAVENOUS
Refills: 0 | Status: DISCONTINUED | OUTPATIENT
Start: 2023-03-07 | End: 2023-03-07

## 2023-03-06 RX ORDER — DEXTROSE 50 % IN WATER 50 %
25 SYRINGE (ML) INTRAVENOUS ONCE
Refills: 0 | Status: DISCONTINUED | OUTPATIENT
Start: 2023-03-06 | End: 2023-04-14

## 2023-03-06 RX ORDER — AMPICILLIN SODIUM AND SULBACTAM SODIUM 250; 125 MG/ML; MG/ML
3 INJECTION, POWDER, FOR SUSPENSION INTRAMUSCULAR; INTRAVENOUS EVERY 24 HOURS
Refills: 0 | Status: DISCONTINUED | OUTPATIENT
Start: 2023-03-07 | End: 2023-04-14

## 2023-03-06 RX ORDER — DEXTROSE 50 % IN WATER 50 %
15 SYRINGE (ML) INTRAVENOUS ONCE
Refills: 0 | Status: DISCONTINUED | OUTPATIENT
Start: 2023-03-06 | End: 2023-04-14

## 2023-03-06 RX ORDER — SODIUM CHLORIDE 9 MG/ML
1000 INJECTION, SOLUTION INTRAVENOUS
Refills: 0 | Status: DISCONTINUED | OUTPATIENT
Start: 2023-03-06 | End: 2023-04-14

## 2023-03-06 RX ORDER — INSULIN LISPRO 100/ML
VIAL (ML) SUBCUTANEOUS
Refills: 0 | Status: DISCONTINUED | OUTPATIENT
Start: 2023-03-06 | End: 2023-04-14

## 2023-03-06 RX ORDER — ERYTHROPOIETIN 10000 [IU]/ML
10000 INJECTION, SOLUTION INTRAVENOUS; SUBCUTANEOUS ONCE
Refills: 0 | Status: COMPLETED | OUTPATIENT
Start: 2023-03-07 | End: 2023-03-13

## 2023-03-06 RX ORDER — GLUCAGON INJECTION, SOLUTION 0.5 MG/.1ML
1 INJECTION, SOLUTION SUBCUTANEOUS ONCE
Refills: 0 | Status: DISCONTINUED | OUTPATIENT
Start: 2023-03-06 | End: 2023-04-14

## 2023-03-06 RX ORDER — DEXTROSE 50 % IN WATER 50 %
12.5 SYRINGE (ML) INTRAVENOUS ONCE
Refills: 0 | Status: DISCONTINUED | OUTPATIENT
Start: 2023-03-06 | End: 2023-04-14

## 2023-03-06 RX ORDER — ONDANSETRON 8 MG/1
4 TABLET, FILM COATED ORAL ONCE
Refills: 0 | Status: DISCONTINUED | OUTPATIENT
Start: 2023-03-07 | End: 2023-03-07

## 2023-03-06 RX ORDER — ACETAMINOPHEN 500 MG
975 TABLET ORAL EVERY 8 HOURS
Refills: 0 | Status: DISCONTINUED | OUTPATIENT
Start: 2023-03-06 | End: 2023-04-14

## 2023-03-06 RX ORDER — ASPIRIN/CALCIUM CARB/MAGNESIUM 324 MG
81 TABLET ORAL DAILY
Refills: 0 | Status: DISCONTINUED | OUTPATIENT
Start: 2023-03-06 | End: 2023-04-14

## 2023-03-06 RX ORDER — OXYCODONE HYDROCHLORIDE 5 MG/1
5 TABLET ORAL EVERY 4 HOURS
Refills: 0 | Status: DISCONTINUED | OUTPATIENT
Start: 2023-03-06 | End: 2023-03-08

## 2023-03-06 RX ORDER — SODIUM CHLORIDE 9 MG/ML
1000 INJECTION, SOLUTION INTRAVENOUS
Refills: 0 | Status: DISCONTINUED | OUTPATIENT
Start: 2023-03-06 | End: 2023-03-07

## 2023-03-06 RX ORDER — AMPICILLIN SODIUM AND SULBACTAM SODIUM 250; 125 MG/ML; MG/ML
INJECTION, POWDER, FOR SUSPENSION INTRAMUSCULAR; INTRAVENOUS
Refills: 0 | Status: DISCONTINUED | OUTPATIENT
Start: 2023-03-06 | End: 2023-04-14

## 2023-03-06 RX ORDER — HYDROMORPHONE HYDROCHLORIDE 2 MG/ML
0.5 INJECTION INTRAMUSCULAR; INTRAVENOUS; SUBCUTANEOUS
Refills: 0 | Status: DISCONTINUED | OUTPATIENT
Start: 2023-03-07 | End: 2023-03-07

## 2023-03-06 RX ORDER — AMPICILLIN SODIUM AND SULBACTAM SODIUM 250; 125 MG/ML; MG/ML
3 INJECTION, POWDER, FOR SUSPENSION INTRAMUSCULAR; INTRAVENOUS ONCE
Refills: 0 | Status: COMPLETED | OUTPATIENT
Start: 2023-03-06 | End: 2023-03-06

## 2023-03-06 RX ORDER — OXYCODONE HYDROCHLORIDE 5 MG/1
10 TABLET ORAL EVERY 4 HOURS
Refills: 0 | Status: DISCONTINUED | OUTPATIENT
Start: 2023-03-06 | End: 2023-03-09

## 2023-03-06 RX ADMIN — AMPICILLIN SODIUM AND SULBACTAM SODIUM 200 GRAM(S): 250; 125 INJECTION, POWDER, FOR SUSPENSION INTRAMUSCULAR; INTRAVENOUS at 19:09

## 2023-03-06 NOTE — ED PROVIDER NOTE - PHYSICAL EXAMINATION
GENERAL: well appearing in no acute distress, non-toxic appearing  HEAD: normocephalic, atraumatic  HEENT: normal conjunctiva, oral mucosa moist, uvula midline  CARDIAC: regular rate and rhythm, normal S1S2, no appreciable murmurs  PULM: speaking in full sentences  GI: abdomen nondistended, soft, nontender  NEURO: moving all 4 extremities, no focal deficits, normal speech, AAOx3   SKIN: well-perfused, extremities warm, abscess of palmar surface of right hand with erythema, multiple healing lesions of right hand  PSYCH: appropriate mood and affect

## 2023-03-06 NOTE — CONSULT NOTE ADULT - SUBJECTIVE AND OBJECTIVE BOX
Patient is a 47y old  Male who presents with a chief complaint of Right forearm gas gangrene (06 Mar 2023 17:32)      HPI: 46yo Pashto speaking male with PMH of DM2, b/l BKA, ESRD (on HD MWF) well known to Orthopedics for treatment of right third finger 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 Sin/Dr. Singh) who was seen by Dr. Sin today in the office and sent in to [facility] for urgent irrigation and debridement. Patient states he missed dialysis today because he was instructed to come straight to the ED. He reports pain and redness of right hand. Otherwise denies chest pain, shortness of breath, abdominal pain, N/V. He denies SOB or chest pain on exertion. Reports fingersticks were uncontrolled at home so restarted pre-meal insulin.         Function: [x ] Independent  [ ] Assistance  [ ] Total care  [ ] Non-ambulatory    Allergies    No Known Drug Allergies  [place] (Rash)    Intolerances        HOME MEDICATIONS: [x ] Reviewed    Aspirin, humalog 4 units before meals, lantus 5 units at bedtime, iron, vitamin d3    MEDICATIONS  (STANDING):  acetaminophen     Tablet .. 975 milliGRAM(s) Oral every 8 hours  ampicillin/sulbactam  IVPB      aspirin  chewable 81 milliGRAM(s) Oral daily  dextrose 5%. 1000 milliLiter(s) (50 mL/Hr) IV Continuous <Continuous>  dextrose 5%. 1000 milliLiter(s) (100 mL/Hr) IV Continuous <Continuous>  dextrose 50% Injectable 25 Gram(s) IV Push once  dextrose 50% Injectable 12.5 Gram(s) IV Push once  dextrose 50% Injectable 25 Gram(s) IV Push once  glucagon  Injectable 1 milliGRAM(s) IntraMuscular once  insulin lispro (ADMELOG) corrective regimen sliding scale   SubCutaneous three times a day before meals  lactated ringers. 1000 milliLiter(s) (100 mL/Hr) IV Continuous <Continuous>    MEDICATIONS  (PRN):  dextrose Oral Gel 15 Gram(s) Oral once PRN Blood Glucose LESS THAN 70 milliGRAM(s)/deciliter  oxyCODONE    IR 10 milliGRAM(s) Oral every 4 hours PRN Severe Pain (7 - 10)  oxyCODONE    IR 5 milliGRAM(s) Oral every 4 hours PRN Moderate Pain (4 - 6)      PAST MEDICAL & SURGICAL HISTORY:  ESRD on dialysis      H/O hypotension      Diabetes mellitus      S/P BKA (below knee amputation) bilateral      AV fistula      [x ] Reviewed     SOCIAL HISTORY:  Residence: [ ] senior care  [ ] SNF  [x ] Community  [ ] Substance abuse: Denies  [ ] Tobacco: Denies  [ ] Alcohol use: Social use    FAMILY HISTORY:  No pertinent family history in first degree relatives    [x ] No pertinent family history in first degree relatives     REVIEW OF SYSTEMS:    CONSTITUTIONAL: No fever, weight loss, or fatigue  NECK: No pain or stiffness  RESPIRATORY: No cough, wheezing, chills or hemoptysis; No shortness of breath  CARDIOVASCULAR: No chest pain, palpitations, dizziness, or leg swelling  GASTROINTESTINAL: No abdominal or epigastric pain. No nausea, vomiting, or hematemesis; No diarrhea or constipation. No melena or hematochezia.  GENITOURINARY: No dysuria, frequency, hematuria, or incontinence  NEUROLOGICAL: No headaches, memory loss, loss of strength, numbness, or tremors  SKIN: redness of right hand  ENDOCRINE: No heat or cold intolerance; No hair loss  MUSCULOSKELETAL: right hand pain No back pain  PSYCHIATRIC: No depression, anxiety, mood swings, or difficulty sleeping    [  ] All other ROS negative  [  ] Unable to obtain due to poor mental status    Vital Signs Last 24 Hrs  T(C): 36.7 (06 Mar 2023 15:37), Max: 36.7 (06 Mar 2023 15:37)  T(F): 98 (06 Mar 2023 15:37), Max: 98 (06 Mar 2023 15:37)  HR: 72 (06 Mar 2023 15:37) (68 - 72)  BP: 146/74 (06 Mar 2023 15:37) (146/74 - 161/105)  BP(mean): --  RR: 18 (06 Mar 2023 15:37) (16 - 18)  SpO2: 100% (06 Mar 2023 15:37) (100% - 100%)    Parameters below as of 06 Mar 2023 15:37  Patient On (Oxygen Delivery Method): room air        PHYSICAL EXAM:    GENERAL: NAD, well-groomed, well-developed  HEAD:  Atraumatic, Normocephalic  EYES: EOMI, PERRLA, conjunctiva and sclera clear  ENMT: Moist mucous membranes  NECK: Supple, No JVD  RESPIRATORY: Clear to auscultation bilaterally; No rales, rhonchi, wheezing, or rubs  CARDIOVASCULAR: Regular rate and rhythm; No murmurs, rubs, or gallops  GASTROINTESTINAL: Soft, Nontender, Nondistended; Bowel sounds present  GENITOURINARY: Not examined  EXTREMITIES:  Prosthetic lower legs.   NERVOUS SYSTEM:  Alert & Oriented X3; Moving all 4 extremities; No gross sensory deficits  SKIN: erythema of right hand, ACE wrapped. s/p 3rd and 2nd digit amputation    LABS:                        10.9   9.77  )-----------( 534      ( 06 Mar 2023 17:54 )             36.1     Hemoglobin: 10.9 g/dL (03-06 @ 17:54)    03-06    131<L>  |  94<L>  |  70<H>  ----------------------------<  177<H>  TNP   |  19<L>  |  9.65<H>    Ca    8.8      06 Mar 2023 18:24  Mg     2.50     03-06    TPro  8.0  /  Alb  3.6  /  TBili  0.3  /  DBili  x   /  AST  37  /  ALT  16  /  AlkPhos  108  03-06    PT/INR - ( 06 Mar 2023 17:54 )   PT: 13.1 sec;   INR: 1.13 ratio         PTT - ( 06 Mar 2023 17:54 )  PTT:30.5 sec    CAPILLARY BLOOD GLUCOSE      POCT Blood Glucose.: 103 mg/dL (06 Mar 2023 13:53)    Microbiology   RADIOLOGY & ADDITIONAL STUDIES:    EKG:   Personally Reviewed:  [ x] YES NSR, no acute ST changes    Imaging:   Personally Reviewed:  [ x] YES               [ x] Consultant(s) Notes Reviewed  [ ] Care Discussed with Consultants/Other Providers:

## 2023-03-06 NOTE — CONSULT NOTE ADULT - ASSESSMENT
48yo Hebrew speaking male with PMH of DM2, b/l BKA, ESRD (on HD MWF) well known to Orthopedics for treatment of right third finger and forearm gas gangrene s/p amputation of right third finger and multiple irrigation and debridements of right hand/forearm (Dr. Sin/Dr. Singh) who was seen by Dr. Sin today in the office and sent in to [facility] for urgent irrigation and debridement.

## 2023-03-06 NOTE — ED PROVIDER NOTE - CLINICAL SUMMARY MEDICAL DECISION MAKING FREE TEXT BOX
47-year-old male with a history of ESRD on dialysis presents with right hand abscess.  Known to hand surgery and will be admitted for operative washout later today.  Preop labs ordered.  Labs, EKG, CXR, XR hand and forearm, blood cultures, type and screen ordered.

## 2023-03-06 NOTE — CONSULT NOTE ADULT - PROBLEM SELECTOR RECOMMENDATION 2
-recurrent infections requiring irrigations and debridements. Exam today c/f abscess  -plan for urgent irrigation and debridement as above  -can c/w Unasyn based on prior cultures. Check MRSA swab  -f/u repeat cx   -pain control   -rest of care as per ortho

## 2023-03-06 NOTE — H&P ADULT - HISTORY OF PRESENT ILLNESS
ORTHOPEDIC SURGERY:     Patient: MELVI RODRIGUEZ , 47y ([birth year])Male   MRN: [id number]  Location: [facility] ED  Visit: 03-06-23 Emergency  Date: 03-06-23 @ 17:38    HPI: This is a 48yo Luxembourgish speaking male with PMH of DM2, b/l BKA, ESRD (on HD MWF) well known to Orthopedics for treatment of right third finger 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 Sin/Dr. Singh) who was seen by Dr. Sin today in the office and sent in to [facility] for urgent irrigation and debridement. Patient states he missed dialysis today because he was instructed to come straight to the ED.     PAST MEDICAL & SURGICAL HISTORY:  ESRD on dialysis  H/O hypotension  Diabetes mellitus  S/P BKA (below knee amputation) bilateral  AV fistula

## 2023-03-06 NOTE — ED ADULT TRIAGE NOTE - CHIEF COMPLAINT QUOTE
Pt sent by PCP with  R hand /finger infection for possible surgery.  Pt NPO since this am, took  insulin romaa am Pt sent by PCP with  R hand /finger infection for possible surgery.  Pt NPO since this am, took  insulin thia am.  Dialysis Mon/Wed/Frid.  No dialysis done today.  R fistula noited

## 2023-03-06 NOTE — PATIENT PROFILE ADULT - FALL HARM RISK - HARM RISK INTERVENTIONS
Assistance with ambulation/Assistance OOB with selected safe patient handling equipment/Communicate Risk of Fall with Harm to all staff/Discuss with provider need for PT consult/Monitor gait and stability/Provide patient with walking aids - walker, cane, crutches/Reinforce activity limits and safety measures with patient and family/Sit up slowly, dangle for a short time, stand at bedside before walking/Tailored Fall Risk Interventions/Use of alarms - bed, chair and/or voice tab/Visual Cue: Yellow wristband and red socks/Bed in lowest position, wheels locked, appropriate side rails in place/Call bell, personal items and telephone in reach/Instruct patient to call for assistance before getting out of bed or chair/Non-slip footwear when patient is out of bed/[place] to call system/Physically safe environment - no spills, clutter or unnecessary equipment/Purposeful Proactive Rounding/Room/bathroom lighting operational, light cord in reach Assistance with ambulation/Assistance OOB with selected safe patient handling equipment/Communicate Risk of Fall with Harm to all staff/Discuss with provider need for PT consult/Monitor gait and stability/Provide patient with walking aids - walker, cane, crutches/Reinforce activity limits and safety measures with patient and family/Tailored Fall Risk Interventions/Visual Cue: Yellow wristband and red socks/Bed in lowest position, wheels locked, appropriate side rails in place/Call bell, personal items and telephone in reach/Instruct patient to call for assistance before getting out of bed or chair/Non-slip footwear when patient is out of bed/[place] to call system/Physically safe environment - no spills, clutter or unnecessary equipment/Purposeful Proactive Rounding/Room/bathroom lighting operational, light cord in reach

## 2023-03-06 NOTE — CONSULT NOTE ADULT - PROBLEM SELECTOR RECOMMENDATION 9
Pt planned for urgent irrigation and debridement   CRI = 2 (Class III) connoting 10.1% 30-day risk of major adverse cardiac event.   -Pt is without acute cardiac condition and tolerated previous I&Ds. Patient has no signs or symptoms of ACS/decompensated HF. EKG today no acute ischemic changes. Most recent TTE 1/13 showed EF 62%, nl LV function, mild MS.   Would repeat BMP to ensure no gross electrolyte abnormalities that need correction. Would benefit from HD prior to OR but given urgency of procedure and no urgent need to dialyze at the moment, will proceed without it. Provided repeat labs are stable, Medically optimized to proceed to OR. Pt planned for urgent irrigation and debridement   CRI = 2 (Class III) connoting 10.1% 30-day risk of major adverse cardiac event.   -Pt is without acute cardiac condition and tolerated previous I&Ds. Patient has no signs or symptoms of ACS/decompensated HF. EKG today no acute ischemic changes. Most recent TTE 1/13 showed EF 62%, nl LV function, mild MS.   Would repeat BMP to ensure no gross electrolyte abnormalities that need correction. Would benefit from HD prior to OR but given urgency of procedure and no urgent need to dialyze at the moment, will proceed without it. Provided repeat labs are stable, Medically optimized to proceed to OR.    Repeat BMP reviewed, pt medically optimized to proceed to OR

## 2023-03-06 NOTE — CONSULT NOTE ADULT - ASSESSMENT
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 Sin/Dr. Singh) who was seen by Dr. Sin today in the office and sent in to [facility] for urgent irrigation and debridement. Patient states he missed dialysis today because he was instructed to come straight to the ED.     PAST MEDICAL & SURGICAL HISTORY:  ESRD on dialysis  H/O hypotension  Diabetes mellitus  S/P BKA (below knee amputation) bilateral  AV fistula (06 Mar 2023 17:32)            esrd on hd   will plan for hd in am   Excess fluids and waste products will be removed from your blood; your electrolytes will be balanced; your blood pressure will be controlled.      ANEMIA PLAN:  Anemia of chronic disease:  Well controlled by Aranesp  H and H subtherapeutic .  We will continue Aranesp aiming for a HCT of 32-36 %.   We will monitor Iron stores, B12 and RBC folate .      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 Sin/Dr. Singh) who was seen by Dr. Sin today in the office and sent in to [facility] for urgent irrigation and debridement. Patient states he missed dialysis today because he was instructed to come straight to the ED.     PAST MEDICAL & SURGICAL HISTORY:  ESRD on dialysis  H/O hypotension  Diabetes mellitus  S/P BKA (below knee amputation) bilateral  AV fistula (06 Mar 2023 17:32)            esrd on hd   will plan for hd in am   Excess fluids and waste products will be removed from your blood; your electrolytes will be balanced; your blood pressure will be controlled.      ANEMIA PLAN:  Anemia of chronic disease:  Well controlled by Aranesp  H and H subtherapeutic .  We will continue Aranesp aiming for a HCT of 32-36 %.   We will monitor Iron stores, B12 and RBC folate .      ,send blood and urine cx,serial lactate levels,monitor vitals closley,ivfs hydration,monitor urine output and renal profile,iv abx

## 2023-03-06 NOTE — CONSULT NOTE ADULT - PROBLEM SELECTOR RECOMMENDATION 3
-missed HD today however does not appear grossly volume overload. Pending repeat BMP to monitor electrolytes  -appreciate Renal recs, plan for HD tomorrow  -renally dose medications   - anemia of renal disease, c/w epo during HD as per renal  - renal restricted diet w/ nepro supplements.

## 2023-03-06 NOTE — CONSULT NOTE ADULT - PROBLEM SELECTOR RECOMMENDATION 4
-on 4 units premeals, 5 units at bedtime  -given npo, can treat with 2 units at bedtime and hold premeals  w/ diabetic retinopathy.  severe proliferative diabetic retinopathy, previously on cosopt, optho outpt follow up   -trend FS

## 2023-03-06 NOTE — H&P ADULT - NSHPPHYSICALEXAM_GEN_ALL_CORE
VITALS:  T(F): 98 (03-06-23 @ 15:37), Max: 98 (03-06-23 @ 15:37)  HR: 72 (03-06-23 @ 15:37) (68 - 72)  BP: 146/74 (03-06-23 @ 15:37) (146/74 - 161/105)  RR: 18 (03-06-23 @ 15:37) (16 - 18)  SpO2: 100% (03-06-23 @ 15:37) (100% - 100%)    Gen: NAD, A&Ox3  Resp: Unlabored breathing  RUE:  healing incisions with erythema, no active purulence or drainage. Prior right 3rd digit amputation.

## 2023-03-06 NOTE — CONSULT NOTE ADULT - SUBJECTIVE AND OBJECTIVE BOX
Patient is a 47y Male whom presented to the hospital with     PAST MEDICAL & SURGICAL HISTORY:  ESRD on dialysis      H/O hypotension      Diabetes mellitus      S/P BKA (below knee amputation) bilateral      AV fistula          MEDICATIONS  (STANDING):  acetaminophen     Tablet .. 975 milliGRAM(s) Oral every 8 hours  ampicillin/sulbactam  IVPB      aspirin  chewable 81 milliGRAM(s) Oral daily  dextrose 5%. 1000 milliLiter(s) (50 mL/Hr) IV Continuous <Continuous>  dextrose 5%. 1000 milliLiter(s) (100 mL/Hr) IV Continuous <Continuous>  dextrose 50% Injectable 25 Gram(s) IV Push once  dextrose 50% Injectable 12.5 Gram(s) IV Push once  dextrose 50% Injectable 25 Gram(s) IV Push once  glucagon  Injectable 1 milliGRAM(s) IntraMuscular once  insulin lispro (ADMELOG) corrective regimen sliding scale   SubCutaneous three times a day before meals  lactated ringers. 1000 milliLiter(s) (100 mL/Hr) IV Continuous <Continuous>      Allergies    No Known Drug Allergies  [place] (Rash)    Intolerances        SOCIAL HISTORY:  Denies ETOh,Smoking,     FAMILY HISTORY:  No pertinent family history in first degree relatives        REVIEW OF SYSTEMS:    CONSTITUTIONAL: No weakness, fevers or chills  EYES/ENT: No visual changes;  no throat pain   NECK: No pain or stiffness  RESPIRATORY: No cough, wheezing, hemoptysis; No shortness of breath  CARDIOVASCULAR: No chest pain or palpitations  GASTROINTESTINAL: No abdominal or epigastric pain. No nausea, vomiting,     No diarrhea or constipation. No melena   GENITOURINARY: No dysuria, frequency or hematuria  NEUROLOGICAL: No numbness or weakness  SKIN: dry      VITAL:  T(C): , Max: 36.7 (03-06-23 @ 15:37)  T(F): , Max: 98 (03-06-23 @ 15:37)  HR: 72 (03-06-23 @ 15:37)  BP: 146/74 (03-06-23 @ 15:37)  BP(mean): --  RR: 18 (03-06-23 @ 15:37)  SpO2: 100% (03-06-23 @ 15:37)  Wt(kg): --    I and O's:    Height (cm): 170.2 (03-06 @ 13:48)    PHYSICAL EXAM:    Constitutional: NAD  HEENT: conjunctive   clear   Neck:  No JVD  Respiratory: CTAB  Cardiovascular: S1 and S2  Gastrointestinal: BS+, soft, NT/ND  Extremities: No peripheral edema  Neurological: A/O x 3, no focal deficits  Psychiatric: Normal mood, normal affect  : No Cancino  Skin: No rashes  Access: Not applicable    LABS:                        10.9   9.77  )-----------( 534      ( 06 Mar 2023 17:54 )             36.1     03-06    137  |  97<L>  |  72<H>  ----------------------------<  158<H>  5.0   |  22  |  10.03<H>    Ca    8.9      06 Mar 2023 21:34  Mg     2.50     03-06    TPro  8.0  /  Alb  3.6  /  TBili  0.3  /  DBili  x   /  AST  37  /  ALT  16  /  AlkPhos  108  03-06      Urine Studies:          RADIOLOGY & ADDITIONAL STUDIES:                   Patient is a 47y Male whom presented to the hospital with esrd on hd    PAST MEDICAL & SURGICAL HISTORY:  ESRD on dialysis      H/O hypotension      Diabetes mellitus      S/P BKA (below knee amputation) bilateral      AV fistula          MEDICATIONS  (STANDING):  acetaminophen     Tablet .. 975 milliGRAM(s) Oral every 8 hours  ampicillin/sulbactam  IVPB      aspirin  chewable 81 milliGRAM(s) Oral daily  dextrose 5%. 1000 milliLiter(s) (50 mL/Hr) IV Continuous <Continuous>  dextrose 5%. 1000 milliLiter(s) (100 mL/Hr) IV Continuous <Continuous>  dextrose 50% Injectable 25 Gram(s) IV Push once  dextrose 50% Injectable 12.5 Gram(s) IV Push once  dextrose 50% Injectable 25 Gram(s) IV Push once  glucagon  Injectable 1 milliGRAM(s) IntraMuscular once  insulin lispro (ADMELOG) corrective regimen sliding scale   SubCutaneous three times a day before meals  lactated ringers. 1000 milliLiter(s) (100 mL/Hr) IV Continuous <Continuous>      Allergies    No Known Drug Allergies  [place] (Rash)    Intolerances        SOCIAL HISTORY:  Denies ETOh,Smoking,     FAMILY HISTORY:  No pertinent family history in first degree relatives        REVIEW OF SYSTEMS:    CONSTITUTIONAL: No weakness, fevers or chills  NECK: No pain or stiffness  RESPIRATORY: No cough, wheezing, hemoptysis; No shortness of breath  CARDIOVASCULAR: No chest pain or palpitations  GASTROINTESTINAL: No abdominal or epigastric pain. No nausea, vomiting,     No diarrhea or constipation. No melena   GENITOURINARY: No dysuria, frequency or hematuria  NEUROLOGICAL: No numbness or weakness  SKIN: dry      VITAL:  T(C): , Max: 36.7 (03-06-23 @ 15:37)  T(F): , Max: 98 (03-06-23 @ 15:37)  HR: 72 (03-06-23 @ 15:37)  BP: 146/74 (03-06-23 @ 15:37)  BP(mean): --  RR: 18 (03-06-23 @ 15:37)  SpO2: 100% (03-06-23 @ 15:37)  Wt(kg): --    I and O's:    Height (cm): 170.2 (03-06 @ 13:48)    PHYSICAL EXAM:    Constitutional: NAD  HEENT: conjunctive   clear   Neck:  No JVD  Respiratory: CTAB  Cardiovascular: S1 and S2  Gastrointestinal: BS+, soft, NT/ND  Extremities: No peripheral edema ,  Prior right 3rd digit amputation.S/P BKA (below knee amputation) bilateral  Neurological: A/O x 3, no focal deficits  Psychiatric: Normal mood, normal affect  : No [place]  Skin:   Access: pos fistula     LABS:                        10.9   9.77  )-----------( 534      ( 06 Mar 2023 17:54 )             36.1     03-06    137  |  97<L>  |  72<H>  ----------------------------<  158<H>  5.0   |  22  |  10.03<H>    Ca    8.9      06 Mar 2023 21:34  Mg     2.50     03-06    TPro  8.0  /  Alb  3.6  /  TBili  0.3  /  DBili  x   /  AST  37  /  ALT  16  /  AlkPhos  108  03-06      Urine Studies:          RADIOLOGY & ADDITIONAL STUDIES:

## 2023-03-06 NOTE — H&P ADULT - ASSESSMENT
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 Sin/Dr. Singh) who was seen by Dr. Sin today in the office and sent in to [facility] for urgent irrigation and debridement.     PLAN:  - NPO for irrigation and debridement of right hand and forearm  - IV Unasyn  - Pain control   - Pre-op labs/COVID, ESR/CRP, blood cultures  - EKG  - CXR  - XR right forearm and right hand   - Nephrology consults for HD planning  - Hospital consult for comanagement and risk stratification     Above plan discussed with Attending Surgeon Dr. Sin.

## 2023-03-06 NOTE — ED PROVIDER NOTE - OBJECTIVE STATEMENT
47-year-old male with a history of ESRD on dialysis, multiple hand infections presents emergency department with a new right hand abscess and need for washout.  Patient was sent by his PMD for repeated washout.  Otherwise patient has no headache, chest pain, shortness of breath, N/V/D/abdominal pain, dysuria, peripheral edema, fever/chills.

## 2023-03-07 DIAGNOSIS — U07.1 COVID-19: ICD-10-CM

## 2023-03-07 DIAGNOSIS — D62 ACUTE POSTHEMORRHAGIC ANEMIA: ICD-10-CM

## 2023-03-07 LAB
ANION GAP SERPL CALC-SCNC: 18 MMOL/L — HIGH (ref 7–14)
BASOPHILS # BLD AUTO: 0.06 K/UL — SIGNIFICANT CHANGE UP (ref 0–0.2)
BASOPHILS NFR BLD AUTO: 0.7 % — SIGNIFICANT CHANGE UP (ref 0–2)
BUN SERPL-MCNC: 79 MG/DL — HIGH (ref 7–23)
CALCIUM SERPL-MCNC: 8.3 MG/DL — LOW (ref 8.4–10.5)
CHLORIDE SERPL-SCNC: 95 MMOL/L — LOW (ref 98–107)
CO2 SERPL-SCNC: 20 MMOL/L — LOW (ref 22–31)
CREAT SERPL-MCNC: 10.72 MG/DL — HIGH (ref 0.5–1.3)
DIALYSIS INSTRUMENT RESULT - HEPATITIS B SURFACE ANTIGEN: NEGATIVE — SIGNIFICANT CHANGE UP
EGFR: 5 ML/MIN/1.73M2 — LOW
EOSINOPHIL # BLD AUTO: 0 K/UL — SIGNIFICANT CHANGE UP (ref 0–0.5)
EOSINOPHIL NFR BLD AUTO: 0 % — SIGNIFICANT CHANGE UP (ref 0–6)
GLUCOSE BLDC GLUCOMTR-MCNC: 133 MG/DL — HIGH (ref 70–99)
GLUCOSE BLDC GLUCOMTR-MCNC: 147 MG/DL — HIGH (ref 70–99)
GLUCOSE BLDC GLUCOMTR-MCNC: 174 MG/DL — HIGH (ref 70–99)
GLUCOSE BLDC GLUCOMTR-MCNC: 179 MG/DL — HIGH (ref 70–99)
GLUCOSE BLDC GLUCOMTR-MCNC: 195 MG/DL — HIGH (ref 70–99)
GLUCOSE BLDC GLUCOMTR-MCNC: 204 MG/DL — HIGH (ref 70–99)
GLUCOSE BLDC GLUCOMTR-MCNC: 236 MG/DL — HIGH (ref 70–99)
GLUCOSE BLDC GLUCOMTR-MCNC: 244 MG/DL — HIGH (ref 70–99)
GLUCOSE SERPL-MCNC: 242 MG/DL — HIGH (ref 70–99)
HCT VFR BLD CALC: 28.3 % — LOW (ref 39–50)
HGB BLD-MCNC: 8.6 G/DL — LOW (ref 13–17)
IANC: 6.71 K/UL — SIGNIFICANT CHANGE UP (ref 1.8–7.4)
IMM GRANULOCYTES NFR BLD AUTO: 0.4 % — SIGNIFICANT CHANGE UP (ref 0–0.9)
LYMPHOCYTES # BLD AUTO: 1.35 K/UL — SIGNIFICANT CHANGE UP (ref 1–3.3)
LYMPHOCYTES # BLD AUTO: 16.2 % — SIGNIFICANT CHANGE UP (ref 13–44)
MCHC RBC-ENTMCNC: 27 PG — SIGNIFICANT CHANGE UP (ref 27–34)
MCHC RBC-ENTMCNC: 30.4 GM/DL — LOW (ref 32–36)
MCV RBC AUTO: 89 FL — SIGNIFICANT CHANGE UP (ref 80–100)
MONOCYTES # BLD AUTO: 0.17 K/UL — SIGNIFICANT CHANGE UP (ref 0–0.9)
MONOCYTES NFR BLD AUTO: 2 % — SIGNIFICANT CHANGE UP (ref 2–14)
NEUTROPHILS # BLD AUTO: 6.71 K/UL — SIGNIFICANT CHANGE UP (ref 1.8–7.4)
NEUTROPHILS NFR BLD AUTO: 80.7 % — HIGH (ref 43–77)
NRBC # BLD: 0 /100 WBCS — SIGNIFICANT CHANGE UP (ref 0–0)
NRBC # FLD: 0 K/UL — SIGNIFICANT CHANGE UP (ref 0–0)
PLATELET # BLD AUTO: 441 K/UL — HIGH (ref 150–400)
POTASSIUM SERPL-MCNC: 5.8 MMOL/L — HIGH (ref 3.5–5.3)
POTASSIUM SERPL-SCNC: 5.8 MMOL/L — HIGH (ref 3.5–5.3)
RBC # BLD: 3.18 M/UL — LOW (ref 4.2–5.8)
RBC # FLD: 14.9 % — HIGH (ref 10.3–14.5)
SODIUM SERPL-SCNC: 133 MMOL/L — LOW (ref 135–145)
VANCOMYCIN TROUGH SERPL-MCNC: <4 UG/ML — LOW (ref 10–20)
WBC # BLD: 8.32 K/UL — SIGNIFICANT CHANGE UP (ref 3.8–10.5)
WBC # FLD AUTO: 8.32 K/UL — SIGNIFICANT CHANGE UP (ref 3.8–10.5)

## 2023-03-07 PROCEDURE — 99233 SBSQ HOSP IP/OBS HIGH 50: CPT

## 2023-03-07 PROCEDURE — 99223 1ST HOSP IP/OBS HIGH 75: CPT

## 2023-03-07 RX ORDER — INSULIN GLARGINE 100 [IU]/ML
5 INJECTION, SOLUTION SUBCUTANEOUS AT BEDTIME
Refills: 0 | Status: DISCONTINUED | OUTPATIENT
Start: 2023-03-07 | End: 2023-03-08

## 2023-03-07 RX ORDER — VANCOMYCIN HCL 1 G
1000 VIAL (EA) INTRAVENOUS ONCE
Refills: 0 | Status: COMPLETED | OUTPATIENT
Start: 2023-03-07 | End: 2023-03-08

## 2023-03-07 RX ORDER — REMDESIVIR 5 MG/ML
100 INJECTION INTRAVENOUS ONCE
Refills: 0 | Status: COMPLETED | OUTPATIENT
Start: 2023-03-09 | End: 2023-03-09

## 2023-03-07 RX ORDER — REMDESIVIR 5 MG/ML
200 INJECTION INTRAVENOUS ONCE
Refills: 0 | Status: COMPLETED | OUTPATIENT
Start: 2023-03-07 | End: 2023-03-08

## 2023-03-07 RX ORDER — SODIUM CHLORIDE 9 MG/ML
100 INJECTION INTRAMUSCULAR; INTRAVENOUS; SUBCUTANEOUS
Refills: 0 | Status: COMPLETED | OUTPATIENT
Start: 2023-03-07 | End: 2023-03-07

## 2023-03-07 RX ORDER — INSULIN LISPRO 100/ML
VIAL (ML) SUBCUTANEOUS AT BEDTIME
Refills: 0 | Status: DISCONTINUED | OUTPATIENT
Start: 2023-03-07 | End: 2023-04-14

## 2023-03-07 RX ORDER — INSULIN LISPRO 100/ML
2 VIAL (ML) SUBCUTANEOUS
Refills: 0 | Status: COMPLETED | OUTPATIENT
Start: 2023-03-07 | End: 2023-03-15

## 2023-03-07 RX ORDER — REMDESIVIR 5 MG/ML
100 INJECTION INTRAVENOUS ONCE
Refills: 0 | Status: COMPLETED | OUTPATIENT
Start: 2023-03-08 | End: 2023-03-08

## 2023-03-07 RX ORDER — CHLORHEXIDINE GLUCONATE 213 G/1000ML
1 SOLUTION TOPICAL
Refills: 0 | Status: COMPLETED | OUTPATIENT
Start: 2023-03-07 | End: 2023-03-10

## 2023-03-07 RX ADMIN — Medication 4: at 11:37

## 2023-03-07 RX ADMIN — Medication 975 MILLIGRAM(S): at 16:30

## 2023-03-07 RX ADMIN — Medication 975 MILLIGRAM(S): at 15:58

## 2023-03-07 RX ADMIN — Medication 4: at 17:05

## 2023-03-07 RX ADMIN — Medication 975 MILLIGRAM(S): at 08:01

## 2023-03-07 RX ADMIN — Medication 975 MILLIGRAM(S): at 08:17

## 2023-03-07 RX ADMIN — Medication 4: at 08:06

## 2023-03-07 RX ADMIN — Medication 2 UNIT(S): at 17:05

## 2023-03-07 RX ADMIN — CHLORHEXIDINE GLUCONATE 1 APPLICATION(S): 213 SOLUTION TOPICAL at 12:52

## 2023-03-07 RX ADMIN — Medication 81 MILLIGRAM(S): at 12:52

## 2023-03-07 NOTE — CONSULT NOTE ADULT - SUBJECTIVE AND OBJECTIVE BOX
Patient is a 47y old  Male who presents with a chief complaint of Right forearm gas gangrene (07 Mar 2023 00:44)    HPI:  47M with DM2, b/l BKA, ESRD (on HD MWF) known to Orthopedics for treatment of right third finger 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 Sin/Dr. Singh) who was seen in the office yesterday and sent in to [facility] for urgent irrigation and debridement (3/7). ID consulted for assistance.    Patient ---           PAST MEDICAL & SURGICAL HISTORY:  ESRD on dialysis      H/O hypotension      Diabetes mellitus      S/P BKA (below knee amputation) bilateral      AV fistula          Allergies  No Known Drug Allergies  [place] (Rash)    ANTIMICROBIALS (past 90 days)  MEDICATIONS  (STANDING):    ampicillin/sulbactam  IVPB   200 mL/Hr IV Intermittent (03-06-23 @ 19:09)        ampicillin/sulbactam  IVPB    ampicillin/sulbactam  IVPB 3 every 24 hours    MEDICATIONS  (STANDING):  acetaminophen     Tablet .. 975 every 8 hours  aspirin  chewable 81 daily  dextrose 50% Injectable 25 once  dextrose 50% Injectable 12.5 once  dextrose 50% Injectable 25 once  dextrose Oral Gel 15 once PRN  epoetin deidre-epbx (RETACRIT) Injectable 67804 once  glucagon  Injectable 1 once  insulin lispro (ADMELOG) corrective regimen sliding scale  three times a day before meals  oxyCODONE    IR 10 every 4 hours PRN  oxyCODONE    IR 5 every 4 hours PRN    SOCIAL HISTORY:       FAMILY HISTORY:  No pertinent family history in first degree relatives      REVIEW OF SYSTEMS  [  ] ROS unobtainable because:    [  ] All other systems negative except as noted below:	    Constitutional:  [ ] fever [ ] chills  [ ] weight loss  [ ] weakness  Skin:  [ ] rash [ ] phlebitis	  Eyes: [ ] icterus [ ] pain  [ ] discharge	  ENMT: [ ] sore throat  [ ] thrush [ ] ulcers [ ] exudates  Respiratory: [ ] dyspnea [ ] hemoptysis [ ] cough [ ] sputum	  Cardiovascular:  [ ] chest pain [ ] palpitations [ ] edema	  Gastrointestinal:  [ ] nausea [ ] vomiting [ ] diarrhea [ ] constipation [ ] pain	  Genitourinary:  [ ] dysuria [ ] frequency [ ] hematuria [ ] discharge [ ] flank pain  [ ] incontinence  Musculoskeletal:  [ ] myalgias [ ] arthralgias [ ] arthritis  [ ] back pain  Neurological:  [ ] headache [ ] seizures  [ ] confusion/altered mental status  Psychiatric:  [ ] anxiety [ ] depression	  Hematology/Lymphatics:  [ ] lymphadenopathy  Endocrine:  [ ] adrenal [ ] thyroid  Allergic/Immunologic:	 [ ] transplant [ ] seasonal    Vital Signs Last 24 Hrs  T(F): 98.4 (03-07-23 @ 08:00), Max: 98.4 (03-07-23 @ 08:00)  Vital Signs Last 24 Hrs  HR: 68 (03-07-23 @ 08:00) (66 - 77)  BP: 121/58 (03-07-23 @ 08:00) (103/40 - 161/105)  RR: 16 (03-07-23 @ 08:00)  SpO2: 99% (03-07-23 @ 08:00) (98% - 100%)  Wt(kg): --    Physical Exam:  Constitutional:  well preserved, comfortable  Head/Eyes: no icterus  ENT:  supple, no cervical lymphadenopathy   LUNGS:  CTA  CVS:  regular rhythm, no murmur  Abd:  soft, non-tender; non-distended  Ext:  no edema  Vascular:  IV site no erythema tenderness or discharge  MSK:  joints without swelling  Neuro: AAO X 3, non- focal                            10.9   9.77  )-----------( 534      ( 06 Mar 2023 17:54 )             36.1   03-06    137  |  97<L>  |  72<H>  ----------------------------<  158<H>  5.0   |  22  |  10.03<H>    Ca    8.9      06 Mar 2023 21:34  Mg     2.50     03-06    TPro  8.0  /  Alb  3.6  /  TBili  0.3  /  DBili  x   /  AST  37  /  ALT  16  /  AlkPhos  108  03-06    MICROBIOLOGY:      RADIOLOGY:  imaging below personally reviewed and agree with findings  < from: Xray Forearm, Right (03.06.23 @ 19:22) >  IMPRESSION:  Areas of bone loss as described above. Follow-up MRI can be ordered if   clinically indicated.    < end of copied text >  < from: Xray Chest 2 Views PA/Lat (03.06.23 @ 19:15) >  IMPRESSION:  Clear lungs.    < end of copied text >   Patient is a 47y old  Male who presents with a chief complaint of Right forearm gas gangrene (07 Mar 2023 00:44)    HPI:  47M with DM2, b/l BKA, ESRD (on HD MWF) admitted recently (1/10 to 2/24) for right third finger and forearm gas gangrene s/p amputation R 3rd digit (1/10) and revision (1/13) with eventual closure, cultures with Ecoli pansensitive with few Strep angionosis and Bacteriodes fragilis, complicated Unasyn 6 week course while inpatient who was seen in the office yesterday and sent in to [facility] for urgent irrigation and debridement (3/7). ID consulted for assistance.    Patient denies any acute complaints at this time, minimal pain in the 3rd digit  Denies fever or chills  Reportedly covid vaccinated x2  Denies any sick contact  Denies cough, dyspnea, URI symptoms    No leukocytosis  Cr 10  ESR CRP 44/23  CXR clear  XR Hand without fracture s/p amputation, no gas  COVID positive        PAST MEDICAL & SURGICAL HISTORY:  ESRD on dialysis      H/O hypotension      Diabetes mellitus      S/P BKA (below knee amputation) bilateral      AV fistula          Allergies  No Known Drug Allergies  [place] (Rash)    ANTIMICROBIALS (past 90 days)  MEDICATIONS  (STANDING):    ampicillin/sulbactam  IVPB   200 mL/Hr IV Intermittent (03-06-23 @ 19:09)        ampicillin/sulbactam  IVPB    ampicillin/sulbactam  IVPB 3 every 24 hours    MEDICATIONS  (STANDING):  acetaminophen     Tablet .. 975 every 8 hours  aspirin  chewable 81 daily  dextrose 50% Injectable 25 once  dextrose 50% Injectable 12.5 once  dextrose 50% Injectable 25 once  dextrose Oral Gel 15 once PRN  epoetin deidre-epbx (RETACRIT) Injectable 70944 once  glucagon  Injectable 1 once  insulin lispro (ADMELOG) corrective regimen sliding scale  three times a day before meals  oxyCODONE    IR 10 every 4 hours PRN  oxyCODONE    IR 5 every 4 hours PRN    SOCIAL HISTORY:   Denies alcohol, tobacco, recreational drug use        FAMILY HISTORY:  No pertinent family history in first degree relatives      REVIEW OF SYSTEMS  [  ] ROS unobtainable because:    [ x ] All other systems negative except as noted below:	    Constitutional:  [ ] fever [ ] chills  [ ] weight loss  [ ] weakness  Skin:  [ ] rash [ ] phlebitis	  Eyes: [ ] icterus [ ] pain  [ ] discharge	  ENMT: [ ] sore throat  [ ] thrush [ ] ulcers [ ] exudates  Respiratory: [ ] dyspnea [ ] hemoptysis [ ] cough [ ] sputum	  Cardiovascular:  [ ] chest pain [ ] palpitations [ ] edema	  Gastrointestinal:  [ ] nausea [ ] vomiting [ ] diarrhea [ ] constipation [ ] pain	  Genitourinary:  [ ] dysuria [ ] frequency [ ] hematuria [ ] discharge [ ] flank pain  [ ] incontinence  Musculoskeletal:  [ ] myalgias [ ] arthralgias [ ] arthritis  [ ] back pain  Neurological:  [ ] headache [ ] seizures  [ ] confusion/altered mental status  Psychiatric:  [ ] anxiety [ ] depression	  Hematology/Lymphatics:  [ ] lymphadenopathy  Endocrine:  [ ] adrenal [ ] thyroid  Allergic/Immunologic:	 [ ] transplant [ ] seasonal    Vital Signs Last 24 Hrs  T(F): 98.4 (03-07-23 @ 08:00), Max: 98.4 (03-07-23 @ 08:00)  Vital Signs Last 24 Hrs  HR: 68 (03-07-23 @ 08:00) (66 - 77)  BP: 121/58 (03-07-23 @ 08:00) (103/40 - 161/105)  RR: 16 (03-07-23 @ 08:00)  SpO2: 99% (03-07-23 @ 08:00) (98% - 100%)  Wt(kg): --    Physical Exam:  Constitutional:  well preserved, comfortable  Head/Eyes: no icterus  ENT:  supple, no cervical lymphadenopathy   LUNGS:  CTA  CVS:  regular rhythm, no murmur  Abd:  soft, non-tender; non-distended  Ext:  +R Hand wrapped, +BKA bilateral  Vascular:  IV site no erythema tenderness or discharge  MSK:  joints without swelling  Neuro: AAO X 3, non- focal                            10.9   9.77  )-----------( 534      ( 06 Mar 2023 17:54 )             36.1   03-06    137  |  97<L>  |  72<H>  ----------------------------<  158<H>  5.0   |  22  |  10.03<H>    Ca    8.9      06 Mar 2023 21:34  Mg     2.50     03-06    TPro  8.0  /  Alb  3.6  /  TBili  0.3  /  DBili  x   /  AST  37  /  ALT  16  /  AlkPhos  108  03-06    MICROBIOLOGY:      RADIOLOGY:  imaging below personally reviewed and agree with findings  < from: Xray Forearm, Right (03.06.23 @ 19:22) >  IMPRESSION:  Areas of bone loss as described above. Follow-up MRI can be ordered if   clinically indicated.    < end of copied text >  < from: Xray Chest 2 Views PA/Lat (03.06.23 @ 19:15) >  IMPRESSION:  Clear lungs.    < end of copied text >   Patient is a 47y old  Male who presents with a chief complaint of Right forearm gas gangrene (07 Mar 2023 00:44)    HPI:  47M with DM2, b/l BKA, ESRD (on HD MWF) admitted recently (1/10 to 2/24) for right third finger and forearm gas gangrene s/p amputation R 3rd digit (1/10) and revision (1/13) with eventual closure, cultures with Ecoli pansensitive with few Strep angionosis and Bacteriodes fragilis, complicated Unasyn 6 week course while inpatient who was seen in the office yesterday and sent in to [facility] for urgent irrigation and debridement (3/7). ID consulted for assistance.    Patient denies any acute complaints at this time, minimal pain in the 3rd digit  Denies fever or chills  Reportedly covid vaccinated x2  Denies any sick contact  Denies cough, dyspnea, URI symptoms    No leukocytosis  Cr 10  ESR CRP 44/23  CXR clear  XR Hand without fracture s/p amputation, no gas  COVID positive        PAST MEDICAL & SURGICAL HISTORY:  ESRD on dialysis      H/O hypotension      Diabetes mellitus      S/P BKA (below knee amputation) bilateral      AV fistula          Allergies  No Known Drug Allergies  [place] (Rash)    ANTIMICROBIALS (past 90 days)  MEDICATIONS  (STANDING):    ampicillin/sulbactam  IVPB   200 mL/Hr IV Intermittent (03-06-23 @ 19:09)        ampicillin/sulbactam  IVPB    ampicillin/sulbactam  IVPB 3 every 24 hours    MEDICATIONS  (STANDING):  acetaminophen     Tablet .. 975 every 8 hours  aspirin  chewable 81 daily  dextrose 50% Injectable 25 once  dextrose 50% Injectable 12.5 once  dextrose 50% Injectable 25 once  dextrose Oral Gel 15 once PRN  epoetin deidre-epbx (RETACRIT) Injectable 45821 once  glucagon  Injectable 1 once  insulin lispro (ADMELOG) corrective regimen sliding scale  three times a day before meals  oxyCODONE    IR 10 every 4 hours PRN  oxyCODONE    IR 5 every 4 hours PRN    SOCIAL HISTORY:  from [facility]  Denies alcohol, tobacco, recreational drug use  no recent travel      FAMILY HISTORY:  No recent febrile illness in family      REVIEW OF SYSTEMS  [  ] ROS unobtainable because:    [ x ] All other systems negative except as noted below:	    Constitutional:  [ ] fever [ ] chills  [ ] weight loss  [ ] weakness  Skin:  [ ] rash [ ] phlebitis	  Eyes: [ ] icterus [ ] pain  [ ] discharge	  ENMT: [ ] sore throat  [ ] thrush [ ] ulcers [ ] exudates  Respiratory: [ ] dyspnea [ ] hemoptysis [ ] cough [ ] sputum	  Cardiovascular:  [ ] chest pain [ ] palpitations [ ] edema	  Gastrointestinal:  [ ] nausea [ ] vomiting [ ] diarrhea [ ] constipation [ ] pain	  Genitourinary:  [ ] dysuria [ ] frequency [ ] hematuria [ ] discharge [ ] flank pain  [ ] incontinence  Musculoskeletal:  R hand edema and wound  Neurological:  [ ] headache [ ] seizures  [ ] confusion/altered mental status  Psychiatric:  [ ] anxiety [ ] depression	  Hematology/Lymphatics:  [ ] lymphadenopathy  Endocrine:  [ ] adrenal [ ] thyroid  Allergic/Immunologic:	 [ ] transplant [ ] seasonal  psych: no anxiety  Vital Signs Last 24 Hrs  T(F): 98.4 (03-07-23 @ 08:00), Max: 98.4 (03-07-23 @ 08:00)  Vital Signs Last 24 Hrs  HR: 68 (03-07-23 @ 08:00) (66 - 77)  BP: 121/58 (03-07-23 @ 08:00) (103/40 - 161/105)  RR: 16 (03-07-23 @ 08:00)  SpO2: 99% (03-07-23 @ 08:00) (98% - 100%)  Wt(kg): --    Physical Exam:  Constitutional:  NAD, non toxic  Head: atraumatic, normocephalic  Eyes: no icterus  ENT:  supple, no cervical lymphadenopathy   LUNGS:  CTA  CVS:  regular rhythm, no murmur  : no suprapubic or CVA tenderness  Abd:  soft, non-tender; non-distended  MSK:  +R Hand s/p debridement with dressing,  +BKA bilateral  Vascular:  IV site no erythema tenderness or discharge  skin: no rash  Neuro: AAO X 3, non- focal  psych: normal affect                          10.9   9.77  )-----------( 534      ( 06 Mar 2023 17:54 )             36.1   03-06    137  |  97<L>  |  72<H>  ----------------------------<  158<H>  5.0   |  22  |  10.03<H>    Ca    8.9      06 Mar 2023 21:34  Mg     2.50     03-06    TPro  8.0  /  Alb  3.6  /  TBili  0.3  /  DBili  x   /  AST  37  /  ALT  16  /  AlkPhos  108  03-06    MICROBIOLOGY:      RADIOLOGY:  imaging below personally reviewed and agree with findings  < from: Xray Forearm, Right (03.06.23 @ 19:22) >  IMPRESSION:  Areas of bone loss as described above. Follow-up MRI can be ordered if   clinically indicated.    < end of copied text >  < from: Xray Chest 2 Views PA/Lat (03.06.23 @ 19:15) >  IMPRESSION:  Clear lungs.    < end of copied text >

## 2023-03-07 NOTE — PROGRESS NOTE ADULT - SUBJECTIVE AND OBJECTIVE BOX
ORTHO PROGRESS NOTE and POSTOP CHECK    Pt seen and examined at bedside, denies SOB, CP, Dizziness. N/V/D /HA.  No significant overnight events. Pain well controlled.    Vital Signs Last 24 Hrs  T(C): 36.7 (06 Mar 2023 15:37), Max: 36.7 (06 Mar 2023 15:37)  T(F): 98 (06 Mar 2023 15:37), Max: 98 (06 Mar 2023 15:37)  HR: 72 (06 Mar 2023 15:37) (68 - 72)  BP: 146/74 (06 Mar 2023 15:37) (146/74 - 161/105)  BP(mean): --  RR: 18 (06 Mar 2023 15:37) (16 - 18)  SpO2: 100% (06 Mar 2023 15:37) (100% - 100%)    Parameters below as of 06 Mar 2023 15:37  Patient On (Oxygen Delivery Method): room air        Gen: NAD, alert and oriented  Resp: Unlabored breathing  Gen: awake, alert, NAD  Resp: no increased work of breathing  RUE  dressing c/d/i  + AIN/PIN/IO  C5-T1 SILT  compartments soft  radial pulse 2+     Labs:  CBC Full  -  ( 06 Mar 2023 17:54 )  WBC Count : 9.77 K/uL  RBC Count : 4.09 M/uL  Hemoglobin : 10.9 g/dL  Hematocrit : 36.1 %  Platelet Count - Automated : 534 K/uL  Mean Cell Volume : 88.3 fL  Mean Cell Hemoglobin : 26.7 pg  Mean Cell Hemoglobin Concentration : 30.2 gm/dL  Auto Neutrophil # : 5.96 K/uL  Auto Lymphocyte # : 2.93 K/uL  Auto Monocyte # : 0.49 K/uL  Auto Eosinophil # : 0.24 K/uL  Auto Basophil # : 0.12 K/uL  Auto Neutrophil % : 61.0 %  Auto Lymphocyte % : 30.0 %  Auto Monocyte % : 5.0 %  Auto Eosinophil % : 2.5 %  Auto Basophil % : 1.2 %      03-06    137  |  97<L>  |  72<H>  ----------------------------<  158<H>  5.0   |  22  |  10.03<H>    Ca    8.9      06 Mar 2023 21:34  Mg     2.50     03-06    TPro  8.0  /  Alb  3.6  /  TBili  0.3  /  DBili  x   /  AST  37  /  ALT  16  /  AlkPhos  108  03-06      A/P  Pt is a 47y Male s/p Right hand and forearm irrigation and debridement    - Pain control/ Analgesia  - DVT ppx A81  -PT/OT   -NWB RUE

## 2023-03-07 NOTE — PROGRESS NOTE ADULT - PROBLEM SELECTOR PLAN 2
-reportedly covid vaccinated x2  -asymptomatic and CXR clear -reportedly covid vaccinated x2  -asymptomatic and CXR clear  -will treat with remdesivir x3 days

## 2023-03-07 NOTE — PROGRESS NOTE ADULT - ASSESSMENT
47M Danish speaking COVID+ h/o DM2, b/l BKA, ESRD (HD MWF), admitted recently (1/10-2/24/23) for steal syndrome, right third finger and forearm gas gangrene s/p amputation R 3rd digit and multiple OR debridement with eventual closure, cultures with Ecoli pansensitive,  Strep angionosis and Bacteriodes fragilis, complicated Unasyn 6 week course while inpatient, now sent back for urgent irrigation and debridement of the his right hand and forearm POD#1.

## 2023-03-07 NOTE — PROGRESS NOTE ADULT - PROBLEM SELECTOR PLAN 4
-f/u Renal recs and plans for HD   -renally dose medications   -anemia of renal disease, c/w epo during HD as per renal  -renal restricted diet w/ nepro supplements.

## 2023-03-07 NOTE — PROGRESS NOTE ADULT - PROBLEM SELECTOR PLAN 5
-diabetic retinopathy.  severe proliferative diabetic retinopathy, previously on cosopt, optho outpt follow up     -QnS8d=5.1%. FS well controlled. Continue with ISS for now and continue to monitor FS closely.  -will repeat HgA1c in am (last one was from Jan)  -patient on 4 units premeals and 5 units at bedtime  -will resume lantus 5units subq at bedtime and admelog 2units qAC before meals  -will titrate up as necessary  -c/w ASA daily

## 2023-03-07 NOTE — PROGRESS NOTE ADULT - ASSESSMENT
This is a 46yo Chinese speaking male with PMH of DM2, b/l BKA, ESRD (on HD MWF) well known to Orthopedics for treatment of right third finger 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 Sin/Dr. Singh) who was seen by Dr. Sin today in the office and sent in to [facility] for urgent irrigation and debridement. Patient states he missed dialysis today because he was instructed to come straight to the ED.     PAST MEDICAL & SURGICAL HISTORY:  ESRD on dialysis  H/O hypotension  Diabetes mellitus  S/P BKA (below knee amputation) bilateral  AV fistula (06 Mar 2023 17:32)            esrd on hd   will plan for hd in am   Excess fluids and waste products will be removed from your blood; your electrolytes will be balanced; your blood pressure will be controlled.      ANEMIA PLAN:  Anemia of chronic disease:  Well controlled by Aranesp  H and H subtherapeutic .  We will continue Aranesp aiming for a HCT of 32-36 %.   We will monitor Iron stores, B12 and RBC folate .      ,send blood and urine cx,serial lactate levels,monitor vitals closley,ivfs hydration,monitor urine output and renal profile,iv abx

## 2023-03-07 NOTE — CONSULT NOTE ADULT - ASSESSMENT
WORK UP      ANTIBIOTIC      DIAGNOSIS and IMPRESSION        RECOMMENDATIONS        PT TO BE SEEN. PRELIM NOTE  PENDING RECS. PLEASE WAIT FOR FINAL RECS AFTER DISCUSSION WITH ATTENDING#    Sher Khan DO, PGY-4   ID fellow  [employer] Preferred  After 5pm/weekends call [phone]   47M with DM2, b/l BKA, ESRD (on HD MWF) admitted recently (1/10 to 2/24) for right third finger and forearm gas gangrene s/p amputation R 3rd digit (1/10) and revision (1/13) with eventual closure, cultures with Ecoli pansensitive with few Strep angionosis and Bacteriodes fragilis, complicated Unasyn 6 week course while inpatient who was seen in the office yesterday and sent in to [facility] for urgent irrigation and debridement (3/7). ID consulted for assistance.    Patient denies any acute complaints at this time, minimal pain in the 3rd digit  Denies fever or chills  Reportedly covid vaccinated x2  Denies any sick contact  Denies cough, dyspnea, URI symptoms    WORK UP  No leukocytosis  Cr 10  ESR CRP 44/23  CXR clear  XR Hand without fracture s/p amputation, no gas  COVID positive    ANTIBIOTIC  Unasyn    DIAGNOSIS and IMPRESSION  #R Hand Infection s/p I&D (3/7)  #Asymptomatic COVID     RECOMMENDATIONS  - continue Unasyn  - trend WBC  - monitor fever  - follow up blood culture x2  - follow up OR culture      PT TO BE SEEN. PRELIM NOTE  PENDING RECS. PLEASE WAIT FOR FINAL RECS AFTER DISCUSSION WITH ATTENDINGYamileth Khan DO, PGY-4   ID fellow  [employer] Preferred  After 5pm/weekends call [phone]   47M with DM2, b/l BKA, ESRD (on HD MWF) admitted recently (1/10 to 2/24) for right third finger and forearm gas gangrene s/p amputation R 3rd digit (1/10) and revision (1/13) with eventual closure, cultures with Ecoli pansensitive with few Strep angionosis and Bacteriodes fragilis, complicated Unasyn 6 week course while inpatient who was seen in the office yesterday and sent in to [facility] for urgent irrigation and debridement (3/7). ID consulted for assistance.    Patient denies any acute complaints at this time, minimal pain in the 3rd digit  Denies fever or chills  Reportedly covid vaccinated x2  Denies any sick contact  Denies cough, dyspnea, URI symptoms    WORK UP  No leukocytosis  Cr 10  ESR CRP 44/23  CXR clear  XR Hand without fracture s/p amputation, no gas  COVID positive    ANTIBIOTIC  Unasyn    DIAGNOSIS and IMPRESSION  #R Hand Infection s/p I&D (3/7)  #Asymptomatic COVID     - no cultures sent from OR  - will need to tailor antibiotic based on clinical from OR, will need to speak with Ortho    RECOMMENDATIONS  - continue Unasyn and Vanco empiric  - trend WBC  - monitor fever  - obtain MRSA swab  - follow up blood culture x2    Case d/w attending and primary team      Sher Khan DO, PGY-4   ID fellow  [employer] Preferred  After 5pm/weekends call [phone]   47M with DM2, b/l BKA, ESRD (on HD MWF) admitted recently (1/10 to 2/24) for right third finger and forearm gas gangrene s/p amputation R 3rd digit (1/10) and revision (1/13) with eventual closure, cultures with Ecoli pansensitive with few Strep angionosis and Bacteriodes fragilis, complicated Unasyn 6 week course while inpatient who was seen in the office yesterday and sent in to [facility] for urgent irrigation and debridement (3/7). ID consulted for assistance.    Patient denies any acute complaints at this time, minimal pain in the 3rd digit  Denies fever or chills  Reportedly covid vaccinated x2  Denies any sick contact  Denies cough, dyspnea, URI symptoms    WORK UP  No leukocytosis  Cr 10  ESR CRP 44/23  CXR clear  XR Hand without fracture s/p amputation, no gas  COVID positive    ANTIBIOTIC  Unasyn    DIAGNOSIS and IMPRESSION  #R Hand Infection s/p I&D (3/7)  #Asymptomatic COVID     - no cultures sent from OR  - will need to tailor antibiotic based on clinical from OR, will need to speak with Ortho    RECOMMENDATIONS  - continue Unasyn and Vanco empiric  - trend WBC  - monitor fever  - obtain MRSA swab  - follow up blood culture x2

## 2023-03-07 NOTE — PROGRESS NOTE ADULT - SUBJECTIVE AND OBJECTIVE BOX
Patient is a 47y Male whom presented to the hospital with esrd on hd    PAST MEDICAL & SURGICAL HISTORY:  ESRD on dialysis      H/O hypotension      Diabetes mellitus      S/P BKA (below knee amputation) bilateral      AV fistula          MEDICATIONS  (STANDING):  acetaminophen     Tablet .. 975 milliGRAM(s) Oral every 8 hours  ampicillin/sulbactam  IVPB      aspirin  chewable 81 milliGRAM(s) Oral daily  dextrose 5%. 1000 milliLiter(s) (50 mL/Hr) IV Continuous <Continuous>  dextrose 5%. 1000 milliLiter(s) (100 mL/Hr) IV Continuous <Continuous>  dextrose 50% Injectable 25 Gram(s) IV Push once  dextrose 50% Injectable 12.5 Gram(s) IV Push once  dextrose 50% Injectable 25 Gram(s) IV Push once  glucagon  Injectable 1 milliGRAM(s) IntraMuscular once  insulin lispro (ADMELOG) corrective regimen sliding scale   SubCutaneous three times a day before meals  lactated ringers. 1000 milliLiter(s) (100 mL/Hr) IV Continuous <Continuous>      Allergies    No Known Drug Allergies  [place] (Rash)    Intolerances        SOCIAL HISTORY:  Denies ETOh,Smoking,     FAMILY HISTORY:  No pertinent family history in first degree relatives        REVIEW OF SYSTEMS:    CONSTITUTIONAL: No weakness, fevers or chills  NECK: No pain or stiffness  RESPIRATORY: No cough, wheezing, hemoptysis; No shortness of breath  CARDIOVASCULAR: No chest pain or palpitations  GASTROINTESTINAL: No abdominal or epigastric pain. No nausea, vomiting,     No diarrhea or constipation. No melena   GENITOURINARY: No dysuria, frequency or hematuria        MEDICATIONS  (STANDING):  acetaminophen     Tablet .. 975 milliGRAM(s) Oral every 8 hours  ampicillin/sulbactam  IVPB      ampicillin/sulbactam  IVPB 3 Gram(s) IV Intermittent every 24 hours  aspirin  chewable 81 milliGRAM(s) Oral daily  chlorhexidine 2% Cloths 1 Application(s) Topical <User Schedule>  dextrose 5%. 1000 milliLiter(s) (50 mL/Hr) IV Continuous <Continuous>  dextrose 5%. 1000 milliLiter(s) (100 mL/Hr) IV Continuous <Continuous>  dextrose 50% Injectable 25 Gram(s) IV Push once  dextrose 50% Injectable 12.5 Gram(s) IV Push once  dextrose 50% Injectable 25 Gram(s) IV Push once  epoetin deidre-epbx (RETACRIT) Injectable 73375 Unit(s) IV Push once  glucagon  Injectable 1 milliGRAM(s) IntraMuscular once  insulin lispro (ADMELOG) corrective regimen sliding scale   SubCutaneous three times a day before meals  vancomycin  IVPB 1000 milliGRAM(s) IV Intermittent once                            8.6    8.32  )-----------( 441      ( 07 Mar 2023 10:25 )             28.3       CBC Full  -  ( 07 Mar 2023 10:25 )  WBC Count : 8.32 K/uL  RBC Count : 3.18 M/uL  Hemoglobin : 8.6 g/dL  Hematocrit : 28.3 %  Platelet Count - Automated : 441 K/uL  Mean Cell Volume : 89.0 fL  Mean Cell Hemoglobin : 27.0 pg  Mean Cell Hemoglobin Concentration : 30.4 gm/dL  Auto Neutrophil # : 6.71 K/uL  Auto Lymphocyte # : 1.35 K/uL  Auto Monocyte # : 0.17 K/uL  Auto Eosinophil # : 0.00 K/uL  Auto Basophil # : 0.06 K/uL  Auto Neutrophil % : 80.7 %  Auto Lymphocyte % : 16.2 %  Auto Monocyte % : 2.0 %  Auto Eosinophil % : 0.0 %  Auto Basophil % : 0.7 %      03-07    133<L>  |  95<L>  |  79<H>  ----------------------------<  242<H>  5.8<H>   |  20<L>  |  10.72<H>    Ca    8.3<L>      07 Mar 2023 10:25  Mg     2.50     03-06    TPro  8.0  /  Alb  3.6  /  TBili  0.3  /  DBili  x   /  AST  37  /  ALT  16  /  AlkPhos  108  03-06      CAPILLARY BLOOD GLUCOSE      POCT Blood Glucose.: 236 mg/dL (07 Mar 2023 11:14)  POCT Blood Glucose.: 244 mg/dL (07 Mar 2023 07:58)  POCT Blood Glucose.: 195 mg/dL (07 Mar 2023 03:17)  POCT Blood Glucose.: 133 mg/dL (07 Mar 2023 00:09)  POCT Blood Glucose.: 103 mg/dL (06 Mar 2023 13:53)      Vital Signs Last 24 Hrs  T(C): 36.7 (07 Mar 2023 10:04), Max: 36.9 (07 Mar 2023 08:00)  T(F): 98.1 (07 Mar 2023 10:04), Max: 98.4 (07 Mar 2023 08:00)  HR: 75 (07 Mar 2023 10:04) (66 - 77)  BP: 120/58 (07 Mar 2023 10:04) (103/40 - 161/105)  BP(mean): 55 (07 Mar 2023 03:00) (50 - 73)  RR: 17 (07 Mar 2023 10:04) (11 - 18)  SpO2: 100% (07 Mar 2023 10:04) (98% - 100%)    Parameters below as of 07 Mar 2023 10:04  Patient On (Oxygen Delivery Method): room air            PT/INR - ( 06 Mar 2023 17:54 )   PT: 13.1 sec;   INR: 1.13 ratio         PTT - ( 06 Mar 2023 17:54 )  PTT:30.5 sec            PHYSICAL EXAM:    Constitutional: NAD  HEENT: conjunctive   clear   Neck:  No JVD  Respiratory: CTAB  Cardiovascular: S1 and S2  Gastrointestinal: BS+, soft, NT/ND  Extremities: No peripheral edema ,  Prior right 3rd digit amputation.S/P BKA (below knee amputation) bilateral  Neurological: A/O x 3, no focal deficits  Psychiatric: Normal mood, normal affect  : No [place]  Skin:   Access: pos fistula

## 2023-03-07 NOTE — PROGRESS NOTE ADULT - SUBJECTIVE AND OBJECTIVE BOX
CHIEF COMPLAINT: f/u right forearm/hand debridement    SUBJECTIVE / OVERNIGHT EVENTS: Patient seen and examined. No acute events overnight. Pain well controlled and patient without any complaints. Patient denies any chest pain or SOB.     MEDICATIONS  (STANDING):  acetaminophen     Tablet .. 975 milliGRAM(s) Oral every 8 hours  ampicillin/sulbactam  IVPB      ampicillin/sulbactam  IVPB 3 Gram(s) IV Intermittent every 24 hours  aspirin  chewable 81 milliGRAM(s) Oral daily  chlorhexidine 2% Cloths 1 Application(s) Topical <User Schedule>  dextrose 5%. 1000 milliLiter(s) (50 mL/Hr) IV Continuous <Continuous>  dextrose 5%. 1000 milliLiter(s) (100 mL/Hr) IV Continuous <Continuous>  dextrose 50% Injectable 25 Gram(s) IV Push once  dextrose 50% Injectable 12.5 Gram(s) IV Push once  dextrose 50% Injectable 25 Gram(s) IV Push once  epoetin deidre-epbx (RETACRIT) Injectable 37846 Unit(s) IV Push once  glucagon  Injectable 1 milliGRAM(s) IntraMuscular once  insulin lispro (ADMELOG) corrective regimen sliding scale   SubCutaneous three times a day before meals  vancomycin  IVPB 1000 milliGRAM(s) IV Intermittent once    MEDICATIONS  (PRN):  dextrose Oral Gel 15 Gram(s) Oral once PRN Blood Glucose LESS THAN 70 milliGRAM(s)/deciliter  oxyCODONE    IR 10 milliGRAM(s) Oral every 4 hours PRN Severe Pain (7 - 10)  oxyCODONE    IR 5 milliGRAM(s) Oral every 4 hours PRN Moderate Pain (4 - 6)      VITALS:  T(F): 98.1 (03-07-23 @ 10:04), Max: 98.4 (03-07-23 @ 08:00)  HR: 75 (03-07-23 @ 10:04) (66 - 77)  BP: 120/58 (03-07-23 @ 10:04) (103/40 - 161/105)  RR: 17 (03-07-23 @ 10:04) (11 - 18)  SpO2: 100% (03-07-23 @ 10:04)  Wt(kg): --  Height (cm): 170.2 (03:45)  Weight (kg): 77.1 (03:45)  BMI (kg/m2): 26.6 (03:45)    PHYSICAL EXAM:  GENERAL: NAD, well-developed  CHEST/LUNG: Clear to auscultation bilaterally; No wheeze  HEART: Regular rate and rhythm; No murmurs, rubs, or gallops  ABDOMEN: Soft, Nontender, Nondistended; Bowel sounds present  EXTREMITIES:  B/L LE amputations +BKA  SKIN: Right hand dressing C/D/I    LABS:              8.6                  133  | 20   | 79           8.32  >-----------< 441     ------------------------< 242                   28.3                 5.8  | 95   | 10.72                                        Ca 8.3   Mg x     Ph x           TPro  8.0  /  Alb  3.6      TBili  0.3  /  DBili  x         AST  37  /  ALT  16            AlkPhos  108      INR: 1.13 ratio;    PT: 13.1 sec;    PTT: 30.5 sec    CAPILLARY BLOOD GLUCOSE  POCT Blood Glucose.: 236 mg/dL (07 Mar 2023 11:14)  POCT Blood Glucose.: 244 mg/dL (07 Mar 2023 07:58)  POCT Blood Glucose.: 195 mg/dL (07 Mar 2023 03:17)  POCT Blood Glucose.: 133 mg/dL (07 Mar 2023 00:09)  POCT Blood Glucose.: 103 mg/dL (06 Mar 2023 13:53)    [ ] Consultant(s) Notes Reviewed:  [x] Care Discussed with Consultants/Other Providers: Orthopedic PA - discussed

## 2023-03-08 LAB
A1C WITH ESTIMATED AVERAGE GLUCOSE RESULT: 5.5 % — SIGNIFICANT CHANGE UP (ref 4–5.6)
ANION GAP SERPL CALC-SCNC: 16 MMOL/L — HIGH (ref 7–14)
BASOPHILS # BLD AUTO: 0.07 K/UL — SIGNIFICANT CHANGE UP (ref 0–0.2)
BASOPHILS NFR BLD AUTO: 0.8 % — SIGNIFICANT CHANGE UP (ref 0–2)
BUN SERPL-MCNC: 33 MG/DL — HIGH (ref 7–23)
CALCIUM SERPL-MCNC: 8.2 MG/DL — LOW (ref 8.4–10.5)
CHLORIDE SERPL-SCNC: 96 MMOL/L — LOW (ref 98–107)
CO2 SERPL-SCNC: 24 MMOL/L — SIGNIFICANT CHANGE UP (ref 22–31)
CREAT SERPL-MCNC: 5.82 MG/DL — HIGH (ref 0.5–1.3)
EGFR: 11 ML/MIN/1.73M2 — LOW
EOSINOPHIL # BLD AUTO: 0.12 K/UL — SIGNIFICANT CHANGE UP (ref 0–0.5)
EOSINOPHIL NFR BLD AUTO: 1.5 % — SIGNIFICANT CHANGE UP (ref 0–6)
ESTIMATED AVERAGE GLUCOSE: 111 — SIGNIFICANT CHANGE UP
GLUCOSE BLDC GLUCOMTR-MCNC: 137 MG/DL — HIGH (ref 70–99)
GLUCOSE BLDC GLUCOMTR-MCNC: 151 MG/DL — HIGH (ref 70–99)
GLUCOSE BLDC GLUCOMTR-MCNC: 170 MG/DL — HIGH (ref 70–99)
GLUCOSE BLDC GLUCOMTR-MCNC: 272 MG/DL — HIGH (ref 70–99)
GLUCOSE SERPL-MCNC: 267 MG/DL — HIGH (ref 70–99)
HBV CORE AB SER-ACNC: SIGNIFICANT CHANGE UP
HBV SURFACE AB SER-ACNC: 5.3 MIU/ML — LOW
HBV SURFACE AG SER-ACNC: SIGNIFICANT CHANGE UP
HCT VFR BLD CALC: 24.8 % — LOW (ref 39–50)
HCV AB S/CO SERPL IA: 0.24 S/CO — SIGNIFICANT CHANGE UP (ref 0–0.99)
HCV AB SERPL-IMP: SIGNIFICANT CHANGE UP
HGB BLD-MCNC: 7.4 G/DL — LOW (ref 13–17)
IANC: 5.34 K/UL — SIGNIFICANT CHANGE UP (ref 1.8–7.4)
IMM GRANULOCYTES NFR BLD AUTO: 0.2 % — SIGNIFICANT CHANGE UP (ref 0–0.9)
LYMPHOCYTES # BLD AUTO: 2.2 K/UL — SIGNIFICANT CHANGE UP (ref 1–3.3)
LYMPHOCYTES # BLD AUTO: 26.6 % — SIGNIFICANT CHANGE UP (ref 13–44)
MCHC RBC-ENTMCNC: 26.4 PG — LOW (ref 27–34)
MCHC RBC-ENTMCNC: 29.8 GM/DL — LOW (ref 32–36)
MCV RBC AUTO: 88.6 FL — SIGNIFICANT CHANGE UP (ref 80–100)
MONOCYTES # BLD AUTO: 0.51 K/UL — SIGNIFICANT CHANGE UP (ref 0–0.9)
MONOCYTES NFR BLD AUTO: 6.2 % — SIGNIFICANT CHANGE UP (ref 2–14)
MRSA PCR RESULT.: SIGNIFICANT CHANGE UP
NEUTROPHILS # BLD AUTO: 5.34 K/UL — SIGNIFICANT CHANGE UP (ref 1.8–7.4)
NEUTROPHILS NFR BLD AUTO: 64.7 % — SIGNIFICANT CHANGE UP (ref 43–77)
NRBC # BLD: 0 /100 WBCS — SIGNIFICANT CHANGE UP (ref 0–0)
NRBC # FLD: 0 K/UL — SIGNIFICANT CHANGE UP (ref 0–0)
PLATELET # BLD AUTO: 399 K/UL — SIGNIFICANT CHANGE UP (ref 150–400)
POTASSIUM SERPL-MCNC: 3.8 MMOL/L — SIGNIFICANT CHANGE UP (ref 3.5–5.3)
POTASSIUM SERPL-SCNC: 3.8 MMOL/L — SIGNIFICANT CHANGE UP (ref 3.5–5.3)
RBC # BLD: 2.8 M/UL — LOW (ref 4.2–5.8)
RBC # FLD: 14.9 % — HIGH (ref 10.3–14.5)
S AUREUS DNA NOSE QL NAA+PROBE: SIGNIFICANT CHANGE UP
SODIUM SERPL-SCNC: 136 MMOL/L — SIGNIFICANT CHANGE UP (ref 135–145)
VANCOMYCIN FLD-MCNC: 13.6 UG/ML — SIGNIFICANT CHANGE UP
WBC # BLD: 8.26 K/UL — SIGNIFICANT CHANGE UP (ref 3.8–10.5)
WBC # FLD AUTO: 8.26 K/UL — SIGNIFICANT CHANGE UP (ref 3.8–10.5)

## 2023-03-08 PROCEDURE — 99222 1ST HOSP IP/OBS MODERATE 55: CPT | Mod: 24

## 2023-03-08 PROCEDURE — 99232 SBSQ HOSP IP/OBS MODERATE 35: CPT

## 2023-03-08 PROCEDURE — 93990 DOPPLER FLOW TESTING: CPT | Mod: 26

## 2023-03-08 PROCEDURE — 99233 SBSQ HOSP IP/OBS HIGH 50: CPT

## 2023-03-08 RX ORDER — VANCOMYCIN HCL 1 G
1000 VIAL (EA) INTRAVENOUS ONCE
Refills: 0 | Status: COMPLETED | OUTPATIENT
Start: 2023-03-08 | End: 2023-03-08

## 2023-03-08 RX ORDER — SENNA PLUS 8.6 MG/1
2 TABLET ORAL AT BEDTIME
Refills: 0 | Status: DISCONTINUED | OUTPATIENT
Start: 2023-03-08 | End: 2023-04-14

## 2023-03-08 RX ORDER — INSULIN GLARGINE 100 [IU]/ML
7 INJECTION, SOLUTION SUBCUTANEOUS AT BEDTIME
Refills: 0 | Status: DISCONTINUED | OUTPATIENT
Start: 2023-03-08 | End: 2023-03-15

## 2023-03-08 RX ADMIN — REMDESIVIR 200 MILLIGRAM(S): 5 INJECTION INTRAVENOUS at 00:08

## 2023-03-08 RX ADMIN — Medication 2 UNIT(S): at 16:46

## 2023-03-08 RX ADMIN — Medication 250 MILLIGRAM(S): at 01:28

## 2023-03-08 RX ADMIN — AMPICILLIN SODIUM AND SULBACTAM SODIUM 200 GRAM(S): 250; 125 INJECTION, POWDER, FOR SUSPENSION INTRAMUSCULAR; INTRAVENOUS at 23:27

## 2023-03-08 RX ADMIN — Medication 975 MILLIGRAM(S): at 13:11

## 2023-03-08 RX ADMIN — Medication 975 MILLIGRAM(S): at 00:06

## 2023-03-08 RX ADMIN — REMDESIVIR 200 MILLIGRAM(S): 5 INJECTION INTRAVENOUS at 15:56

## 2023-03-08 RX ADMIN — SODIUM CHLORIDE 310 MILLILITER(S): 9 INJECTION INTRAMUSCULAR; INTRAVENOUS; SUBCUTANEOUS at 00:45

## 2023-03-08 RX ADMIN — INSULIN GLARGINE 7 UNIT(S): 100 INJECTION, SOLUTION SUBCUTANEOUS at 21:45

## 2023-03-08 RX ADMIN — Medication 975 MILLIGRAM(S): at 21:42

## 2023-03-08 RX ADMIN — Medication 975 MILLIGRAM(S): at 13:41

## 2023-03-08 RX ADMIN — Medication 975 MILLIGRAM(S): at 08:05

## 2023-03-08 RX ADMIN — Medication 81 MILLIGRAM(S): at 13:11

## 2023-03-08 RX ADMIN — SENNA PLUS 2 TABLET(S): 8.6 TABLET ORAL at 21:42

## 2023-03-08 RX ADMIN — Medication 2 UNIT(S): at 11:31

## 2023-03-08 RX ADMIN — CHLORHEXIDINE GLUCONATE 1 APPLICATION(S): 213 SOLUTION TOPICAL at 19:41

## 2023-03-08 RX ADMIN — Medication 250 MILLIGRAM(S): at 16:46

## 2023-03-08 RX ADMIN — Medication 6: at 07:34

## 2023-03-08 RX ADMIN — Medication 975 MILLIGRAM(S): at 07:17

## 2023-03-08 RX ADMIN — OXYCODONE HYDROCHLORIDE 5 MILLIGRAM(S): 5 TABLET ORAL at 00:05

## 2023-03-08 RX ADMIN — INSULIN GLARGINE 5 UNIT(S): 100 INJECTION, SOLUTION SUBCUTANEOUS at 00:05

## 2023-03-08 RX ADMIN — OXYCODONE HYDROCHLORIDE 10 MILLIGRAM(S): 5 TABLET ORAL at 22:42

## 2023-03-08 RX ADMIN — AMPICILLIN SODIUM AND SULBACTAM SODIUM 200 GRAM(S): 250; 125 INJECTION, POWDER, FOR SUSPENSION INTRAMUSCULAR; INTRAVENOUS at 00:52

## 2023-03-08 RX ADMIN — Medication 2 UNIT(S): at 07:35

## 2023-03-08 RX ADMIN — OXYCODONE HYDROCHLORIDE 5 MILLIGRAM(S): 5 TABLET ORAL at 00:51

## 2023-03-08 RX ADMIN — Medication 975 MILLIGRAM(S): at 22:42

## 2023-03-08 RX ADMIN — OXYCODONE HYDROCHLORIDE 10 MILLIGRAM(S): 5 TABLET ORAL at 21:42

## 2023-03-08 RX ADMIN — Medication 2: at 16:45

## 2023-03-08 RX ADMIN — Medication 975 MILLIGRAM(S): at 00:51

## 2023-03-08 NOTE — PROGRESS NOTE ADULT - SUBJECTIVE AND OBJECTIVE BOX
CHIEF COMPLAINT: f/u right forearm/hand debridement    SUBJECTIVE / OVERNIGHT EVENTS: Patient seen and examined. No acute events overnight. Pain well controlled and patient without any complaints.    MEDICATIONS  (STANDING):  acetaminophen     Tablet .. 975 milliGRAM(s) Oral every 8 hours  ampicillin/sulbactam  IVPB      ampicillin/sulbactam  IVPB 3 Gram(s) IV Intermittent every 24 hours  aspirin  chewable 81 milliGRAM(s) Oral daily  chlorhexidine 2% Cloths 1 Application(s) Topical <User Schedule>  dextrose 5%. 1000 milliLiter(s) (50 mL/Hr) IV Continuous <Continuous>  dextrose 5%. 1000 milliLiter(s) (100 mL/Hr) IV Continuous <Continuous>  dextrose 50% Injectable 25 Gram(s) IV Push once  dextrose 50% Injectable 12.5 Gram(s) IV Push once  dextrose 50% Injectable 25 Gram(s) IV Push once  epoetin deidre-epbx (RETACRIT) Injectable 14899 Unit(s) IV Push once  glucagon  Injectable 1 milliGRAM(s) IntraMuscular once  insulin glargine Injectable (LANTUS) 7 Unit(s) SubCutaneous at bedtime  insulin lispro (ADMELOG) corrective regimen sliding scale   SubCutaneous at bedtime  insulin lispro (ADMELOG) corrective regimen sliding scale   SubCutaneous three times a day before meals  insulin lispro Injectable (ADMELOG) 2 Unit(s) SubCutaneous three times a day before meals  remdesivir  IVPB 100 milliGRAM(s) IV Intermittent once    MEDICATIONS  (PRN):  dextrose Oral Gel 15 Gram(s) Oral once PRN Blood Glucose LESS THAN 70 milliGRAM(s)/deciliter  oxyCODONE    IR 10 milliGRAM(s) Oral every 4 hours PRN Severe Pain (7 - 10)  oxyCODONE    IR 5 milliGRAM(s) Oral every 4 hours PRN Moderate Pain (4 - 6)    VITALS:  T(F): 98.4 (03-08-23 @ 10:10), Max: 98.7 (03-07-23 @ 15:39)  HR: 88 (03-08-23 @ 10:10) (68 - 88)  BP: 127/66 (03-08-23 @ 10:10) (99/59 - 127/66)  RR: 17 (03-08-23 @ 10:10) (16 - 18)  SpO2: 99% (03-08-23 @ 10:10)    PHYSICAL EXAM:  GENERAL: NAD, well-developed  CHEST/LUNG: Clear to auscultation bilaterally; No wheeze  HEART: Regular rate and rhythm; No murmurs, rubs, or gallops  ABDOMEN: Soft, Nontender, Nondistended; Bowel sounds present  EXTREMITIES:  B/L LE amputations +BKA  SKIN: Right hand dressing C/D/I    LABS:              7.4                  136  | 24   | 33           8.26  >-----------< 399     ------------------------< 267                   24.8                 3.8  | 96   | 5.82                                         Ca 8.2   Mg x     Ph x        H/H trend  03-08 @ 05:58   HgB  7.4   HCT  24.8  03-07 @ 10:25   HgB  8.6   HCT  28.3  03-06 @ 17:54   HgB  10.9  HCT  36.1       TPro  8.0  /  Alb  3.6      TBili  0.3  /  DBili  x         AST  37  /  ALT  16            AlkPhos  108      INR: 1.13 ratio;    PT: 13.1 sec;    PTT: 30.5 sec    CAPILLARY BLOOD GLUCOSE  POCT Blood Glucose.: 137 mg/dL (08 Mar 2023 11:20)  POCT Blood Glucose.: 272 mg/dL (08 Mar 2023 07:14)  POCT Blood Glucose.: 179 mg/dL (07 Mar 2023 23:39)  POCT Blood Glucose.: 147 mg/dL (07 Mar 2023 22:18)  POCT Blood Glucose.: 174 mg/dL (07 Mar 2023 19:17)  POCT Blood Glucose.: 204 mg/dL (07 Mar 2023 16:59)    [ ] Consultant(s) Notes Reviewed:  [x] Care Discussed with Consultants/Other Providers: Orthopedic PA - discussed management of hyperglycemia and increasing Lantus' dose

## 2023-03-08 NOTE — PROGRESS NOTE ADULT - SUBJECTIVE AND OBJECTIVE BOX
Follow Up:  hand gangrene, steal syndrome    Interval History/ROS: no fever, SOB, vomiting, still with some hand pain          Allergies  No Known Drug Allergies  [place] (Rash)        ANTIMICROBIALS:  ampicillin/sulbactam  IVPB    ampicillin/sulbactam  IVPB 3 every 24 hours      OTHER MEDS:  acetaminophen     Tablet .. 975 milliGRAM(s) Oral every 8 hours  aspirin  chewable 81 milliGRAM(s) Oral daily  chlorhexidine 2% Cloths 1 Application(s) Topical <User Schedule>  dextrose 5%. 1000 milliLiter(s) IV Continuous <Continuous>  dextrose 5%. 1000 milliLiter(s) IV Continuous <Continuous>  dextrose 50% Injectable 25 Gram(s) IV Push once  dextrose 50% Injectable 12.5 Gram(s) IV Push once  dextrose 50% Injectable 25 Gram(s) IV Push once  dextrose Oral Gel 15 Gram(s) Oral once PRN  epoetin deidre-epbx (RETACRIT) Injectable 37298 Unit(s) IV Push once  glucagon  Injectable 1 milliGRAM(s) IntraMuscular once  insulin glargine Injectable (LANTUS) 7 Unit(s) SubCutaneous at bedtime  insulin lispro (ADMELOG) corrective regimen sliding scale   SubCutaneous at bedtime  insulin lispro (ADMELOG) corrective regimen sliding scale   SubCutaneous three times a day before meals  insulin lispro Injectable (ADMELOG) 2 Unit(s) SubCutaneous three times a day before meals  oxyCODONE    IR 10 milliGRAM(s) Oral every 4 hours PRN  oxyCODONE    IR 5 milliGRAM(s) Oral every 4 hours PRN  senna 2 Tablet(s) Oral at bedtime      Vital Signs Last 24 Hrs  T(C): 36.9 (08 Mar 2023 10:10), Max: 36.9 (08 Mar 2023 10:10)  T(F): 98.4 (08 Mar 2023 10:10), Max: 98.4 (08 Mar 2023 10:10)  HR: 88 (08 Mar 2023 10:10) (72 - 88)  BP: 127/66 (08 Mar 2023 10:10) (99/59 - 127/66)  BP(mean): --  RR: 17 (08 Mar 2023 10:10) (17 - 18)  SpO2: 99% (08 Mar 2023 10:10) (99% - 100%)    Parameters below as of 08 Mar 2023 10:10  Patient On (Oxygen Delivery Method): room air        Physical Exam:  General:    NAD,  non toxic  Respiratory:    comfortable on RA  abd:     soft,      no tenderness  :   no CVAT,  no suprapubic tenderness,   no  lackey  Musculoskeletal:   b/l BKA, R hand s/p I&D with dressing, index tip with wound and no nail  vascular: no phlebitis  Skin:    no rash                          7.4    8.26  )-----------( 399      ( 08 Mar 2023 05:58 )             24.8       03-08    136  |  96<L>  |  33<H>  ----------------------------<  267<H>  3.8   |  24  |  5.82<H>    Ca    8.2<L>      08 Mar 2023 05:58  Mg     2.50     03-06    TPro  8.0  /  Alb  3.6  /  TBili  0.3  /  DBili  x   /  AST  37  /  ALT  16  /  AlkPhos  108  03-06          MICROBIOLOGY:  Vancomycin Level, Random: 13.6 ug/mL (03-08-23 @ 10:38)  v  .Blood Blood  03-06-23   No growth to date.  --  --      .Blood Blood  03-06-23   No growth to date.  --  --                RADIOLOGY:  Images independently visualized and reviewed personally, findings as below  < from: Xray Forearm, Right (03.06.23 @ 19:22) >  FINDINGS:  No acute displaced fracture.  Status post right third digit amputation at the distal metacarpal and   right second digit amputation at the middle phalanx.  Diffuse osseous demineralization. Cortical loss at the distal aspect of   the first distal phalanx is increased from prior exam. Cortical loss at   the mid/proximal fourth and fifthdigit similar to prior exam.  No subcutaneous tracting gas. Vascular calcifications. Surgical clips   project over the right antecubital fossa.    IMPRESSION:  Areas of bone loss as described above. Follow-up MRI can be ordered if   clinically indicated.    < end of copied text >  < from: [facility] Duplex Hemodialysis Access, Right. (03.08.23 @ 14:56) >  1. Right brachiocephalic arteriovenous fistula appears  patent, with no hemodynamically significant stenosis  present at the anastomotic site ( cm/sec, EDV 47  cm/sec).   The proximal usable segment appears completely  thrombosed with no flow visualized on Color Doppler. The  mid to distal usable segment appears patent without  evidence of thrombosis, with collateral flow to outflow  vein.  The maximal volume flow velocity obtained within the  fistula is 867 ml/min in the mid section. The remaining  outflow vein appears patent without evidence of  thrombosis.  PVR of digits were taken prior and during exam to rule out  steal syndrome.  PVR of the first, fourth, and fifth digit seem to improve  in amplitude while arteriovenous fistula is compressed,  likely due to steal syndrome.    < end of copied text >

## 2023-03-08 NOTE — PROGRESS NOTE ADULT - ASSESSMENT
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 Sin/Dr. Singh) who was seen by Dr. Sin today in the office and sent in to [facility] for urgent irrigation and debridement. Patient states he missed dialysis today because he was instructed to come straight to the ED.     PAST MEDICAL & SURGICAL HISTORY:  ESRD on dialysis  H/O hypotension  Diabetes mellitus  S/P BKA (below knee amputation) bilateral  AV fistula (06 Mar 2023 17:32)            esrd on hd   will plan for hd as order    Excess fluids and waste products will be removed from your blood; your electrolytes will be balanced; your blood pressure will be controlled.      ANEMIA PLAN:  Anemia of chronic disease:  Well controlled by Aranesp  H and H subtherapeutic .  We will continue Aranesp aiming for a HCT of 32-36 %.   We will monitor Iron stores, B12 and RBC folate .      ,send blood and urine cx,serial lactate levels,monitor vitals closley,ivfs hydration,monitor urine output and renal profile,iv abx

## 2023-03-08 NOTE — CONSULT NOTE ADULT - SUBJECTIVE AND OBJECTIVE BOX
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    Patient s/p right hand and forearm irrigation and debridement.      Vascular Surgery consulted to r/o steal syndrome.    PAST MEDICAL & SURGICAL HISTORY:  ESRD on dialysis      H/O hypotension      Diabetes mellitus      S/P BKA (below knee amputation) bilateral      AV fistula      Vital Signs Last 24 Hrs  T(C): 36.9 (08 Mar 2023 10:10), Max: 37.1 (07 Mar 2023 15:39)  T(F): 98.4 (08 Mar 2023 10:10), Max: 98.7 (07 Mar 2023 15:39)  HR: 88 (08 Mar 2023 10:10) (68 - 88)  BP: 127/66 (08 Mar 2023 10:10) (99/59 - 127/66)  BP(mean): --  RR: 17 (08 Mar 2023 10:10) (16 - 18)  SpO2: 99% (08 Mar 2023 10:10) (99% - 100%)    Parameters below as of 08 Mar 2023 10:10  Patient On (Oxygen Delivery Method): room air    Physical exam  General: NAD  Cardiac: Regular rate  Respiratory: Nonlabored breathing  Abdominal Exam: soft, nondistended, nontender  Vascular: Palpable thrill over RUE AVF, no redness, no tenderness, forearm and hand wrapped in ace.  Muskuloskeletal: Moves all 4 extremities spontaneously  Neurological: Alert and oriented, no gross focal deficits, no motor or sensory deficits.  Psych: AOX3     LABS:                           7.4    8.26  )-----------( 399      ( 08 Mar 2023 05:58 )             24.8     03-08    136  |  96<L>  |  33<H>  ----------------------------<  267<H>  3.8   |  24  |  5.82<H>    Ca    8.2<L>      08 Mar 2023 05:58  Mg     2.50     03-06    TPro  8.0  /  Alb  3.6  /  TBili  0.3  /  DBili  x   /  AST  37  /  ALT  16  /  AlkPhos  108  03-06    PT/INR - ( 06 Mar 2023 17:54 )   PT: 13.1 sec;   INR: 1.13 ratio         PTT - ( 06 Mar 2023 17:54 )  PTT:30.5 sec

## 2023-03-08 NOTE — PROGRESS NOTE ADULT - PROBLEM SELECTOR PLAN 5
-diabetic retinopathy.  severe proliferative diabetic retinopathy, previously on cosopt, optho outpt follow up   -OsK9z=9.5%. FS mildly controlled in-house.  -c/w ISS for now and continue to monitor FS closely.  -patient on 4 units premeals and 5 units at bedtime  -will increase lantus to 7units subq at bedtime and c/w admelog 2units qAC before meals  -will titrate up as necessary  -c/w ASA daily

## 2023-03-08 NOTE — PROGRESS NOTE ADULT - ASSESSMENT
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 and multiple OR debridement with eventual closure, cultures with Ecoli pansensitive,  Strep angionosis and Bacteriodes fragilis, complicated Unasyn 6 week course while inpatient, now sent back by ortho for debridement as the hand was gangrenous and infected as per ortho afebrile, WBC normal, ESR: 44, CRP 23  xray  with areas of bone loss  s/p debridement 3/7 and no cultures sent  also COVID positive and CXR clear    ESRD with recent steal syndrome and R 3rd finger and forearm gas gangrene s/p amp and multiple debridement, completed a 6 week course of unasyn but after completing sent back for infected hand  s/p OR debridement 3/7 but no cultures sent   blood cx negative, MRSA PCR negative  VA duplex s/o steal syndrome  Asymptomatic COVID CXR clear    * discontinue vanco  * c/w unasyn   * hand and forearm MRI  * f/u with vascular  * complete a 3 day course of remdesivir    The above assessment and plan was discussed with the primary team    Tori Chamorro MD  contact on teams  After 5pm and on weekends call [phone]

## 2023-03-08 NOTE — PROGRESS NOTE ADULT - SUBJECTIVE AND OBJECTIVE BOX
ORTHO PROGRESS NOTE     Patient resting comfortably without complaint      Vital Signs Last 24 Hrs  T(C): 36.7 (08 Mar 2023 07:16), Max: 37.1 (07 Mar 2023 15:39)  T(F): 98.1 (08 Mar 2023 07:16), Max: 98.7 (07 Mar 2023 15:39)  HR: 83 (08 Mar 2023 07:16) (68 - 83)  BP: 126/60 (08 Mar 2023 07:16) (99/59 - 126/60)  BP(mean): --  RR: 18 (08 Mar 2023 07:16) (16 - 18)  SpO2: 100% (08 Mar 2023 07:16) (99% - 100%)    Parameters below as of 08 Mar 2023 07:16  Patient On (Oxygen Delivery Method): room air    Gen: NAD, alert and oriented  Resp: Unlabored breathing  Gen: awake, alert, NAD  Resp: no increased work of breathing  RUE  dressing c/d/i, W-> D dressing change done   + AIN/PIN/IO  C5-T1 SILT  compartments soft  radial pulse 2+         A/P:  Stable s/p Right hand and forearm irrigation and debridement    - Pain control/ Analgesia  - DVT ppx A81  -PT/OT   -NWB RUE

## 2023-03-08 NOTE — PROGRESS NOTE ADULT - PROBLEM SELECTOR PLAN 2
-reportedly covid vaccinated x2  -asymptomatic and CXR clear  -will treat with remdesivir x3 days -- currently day #2/3

## 2023-03-08 NOTE — CONSULT NOTE ADULT - ASSESSMENT
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 Patient POD1 s/p right hand and forearm irrigation and debridement. Vascular Surgery consulted to r/o steal syndrome.    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 Patient POD1 s/p right hand and forearm irrigation and debridement. Vascular Surgery consulted to r/o steal syndrome.    Plan/recommendations  - No acute vascular surgical intervention  - Please order RUE access duplex  - Rest of care per primary team  - Vascular Surgery will follow  - Plan discussed with Vascular Surgery Fellow on call on behalf of Dr. 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 Patient POD1 s/p right hand and forearm irrigation and debridement. Vascular Surgery consulted to r/o steal syndrome.    Plan/recommendations  - No acute vascular surgical intervention  - Please order RUE access duplex  - Plan for angiogram on Monday vs Tuesday next week to r/o RUE PAD  - Please document Medicine and Cardiology optimization  - Rest of care per primary team  - Vascular Surgery will follow  - Plan discussed with Vascular Surgery Fellow on call on behalf of Dr. 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 Patient POD1 s/p right hand and forearm irrigation and debridement. Vascular Surgery consulted to r/o steal syndrome.    Plan/recommendations  - No acute vascular surgical intervention  - Please order RUE access duplex  - Rest of care per primary team  - Vascular Surgery will follow  - Plan discussed with Vascular Surgery Fellow on call on behalf of Dr. 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 Patient POD1 s/p right hand and forearm irrigation and debridement. Vascular Surgery consulted to r/o steal syndrome.    Plan/recommendations  - No acute vascular surgical intervention  - Please order RUE access duplex  - Rest of care per primary team  - Vascular Surgery will follow  - Plan discussed with Vascular Surgery Fellow on call on behalf of Dr. Palmer    Vascular Surgery  [phone]

## 2023-03-08 NOTE — PROGRESS NOTE ADULT - SUBJECTIVE AND OBJECTIVE BOX
Patient is a 47y Male whom presented to the hospital with esrd on hd    PAST MEDICAL & SURGICAL HISTORY:  ESRD on dialysis      H/O hypotension      Diabetes mellitus      S/P BKA (below knee amputation) bilateral      AV fistula          MEDICATIONS  (STANDING):  acetaminophen     Tablet .. 975 milliGRAM(s) Oral every 8 hours  ampicillin/sulbactam  IVPB      aspirin  chewable 81 milliGRAM(s) Oral daily  dextrose 5%. 1000 milliLiter(s) (50 mL/Hr) IV Continuous <Continuous>  dextrose 5%. 1000 milliLiter(s) (100 mL/Hr) IV Continuous <Continuous>  dextrose 50% Injectable 25 Gram(s) IV Push once  dextrose 50% Injectable 12.5 Gram(s) IV Push once  dextrose 50% Injectable 25 Gram(s) IV Push once  glucagon  Injectable 1 milliGRAM(s) IntraMuscular once  insulin lispro (ADMELOG) corrective regimen sliding scale   SubCutaneous three times a day before meals  lactated ringers. 1000 milliLiter(s) (100 mL/Hr) IV Continuous <Continuous>      Allergies    No Known Drug Allergies  [place] (Rash)    Intolerances        SOCIAL HISTORY:  Denies ETOh,Smoking,     FAMILY HISTORY:  No pertinent family history in first degree relatives        REVIEW OF SYSTEMS:    CONSTITUTIONAL: No weakness, fevers or chills  NECK: No pain or stiffness  RESPIRATORY: No cough, wheezing, hemoptysis; No shortness of breath  CARDIOVASCULAR: No chest pain or palpitations  GASTROINTESTINAL: No abdominal or epigastric pain. No nausea, vomiting,                               7.4    8.26  )-----------( 399      ( 08 Mar 2023 05:58 )             24.8       CBC Full  -  ( 08 Mar 2023 05:58 )  WBC Count : 8.26 K/uL  RBC Count : 2.80 M/uL  Hemoglobin : 7.4 g/dL  Hematocrit : 24.8 %  Platelet Count - Automated : 399 K/uL  Mean Cell Volume : 88.6 fL  Mean Cell Hemoglobin : 26.4 pg  Mean Cell Hemoglobin Concentration : 29.8 gm/dL  Auto Neutrophil # : 5.34 K/uL  Auto Lymphocyte # : 2.20 K/uL  Auto Monocyte # : 0.51 K/uL  Auto Eosinophil # : 0.12 K/uL  Auto Basophil # : 0.07 K/uL  Auto Neutrophil % : 64.7 %  Auto Lymphocyte % : 26.6 %  Auto Monocyte % : 6.2 %  Auto Eosinophil % : 1.5 %  Auto Basophil % : 0.8 %      03-08    136  |  96<L>  |  33<H>  ----------------------------<  267<H>  3.8   |  24  |  5.82<H>    Ca    8.2<L>      08 Mar 2023 05:58  Mg     2.50     03-06    TPro  8.0  /  Alb  3.6  /  TBili  0.3  /  DBili  x   /  AST  37  /  ALT  16  /  AlkPhos  108  03-06      CAPILLARY BLOOD GLUCOSE      POCT Blood Glucose.: 137 mg/dL (08 Mar 2023 11:20)  POCT Blood Glucose.: 272 mg/dL (08 Mar 2023 07:14)  POCT Blood Glucose.: 179 mg/dL (07 Mar 2023 23:39)  POCT Blood Glucose.: 147 mg/dL (07 Mar 2023 22:18)  POCT Blood Glucose.: 174 mg/dL (07 Mar 2023 19:17)  POCT Blood Glucose.: 204 mg/dL (07 Mar 2023 16:59)      Vital Signs Last 24 Hrs  T(C): 36.9 (08 Mar 2023 10:10), Max: 37.1 (07 Mar 2023 15:39)  T(F): 98.4 (08 Mar 2023 10:10), Max: 98.7 (07 Mar 2023 15:39)  HR: 88 (08 Mar 2023 10:10) (68 - 88)  BP: 127/66 (08 Mar 2023 10:10) (99/59 - 127/66)  BP(mean): --  RR: 17 (08 Mar 2023 10:10) (16 - 18)  SpO2: 99% (08 Mar 2023 10:10) (99% - 100%)    Parameters below as of 08 Mar 2023 10:10  Patient On (Oxygen Delivery Method): room air            PT/INR - ( 06 Mar 2023 17:54 )   PT: 13.1 sec;   INR: 1.13 ratio         PTT - ( 06 Mar 2023 17:54 )  PTT:30.5 sec    PHYSICAL EXAM:    Constitutional: NAD  HEENT: conjunctive   clear   Neck:  No JVD  Respiratory: CTAB  Cardiovascular: S1 and S2  Gastrointestinal: BS+, soft, NT/ND  Extremities: No peripheral edema ,  Prior right 3rd digit amputation.S/P BKA (below knee amputation) bilateral  Neurological:  no focal deficits  Psychiatric: Normal mood, normal affect  : No Cancino  Skin:   Access: pos fistula

## 2023-03-08 NOTE — PROGRESS NOTE ADULT - ASSESSMENT
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

## 2023-03-09 LAB
CULTURE RESULTS: SIGNIFICANT CHANGE UP
GLUCOSE BLDC GLUCOMTR-MCNC: 172 MG/DL — HIGH (ref 70–99)
GLUCOSE BLDC GLUCOMTR-MCNC: 188 MG/DL — HIGH (ref 70–99)
GLUCOSE BLDC GLUCOMTR-MCNC: 188 MG/DL — HIGH (ref 70–99)
GLUCOSE BLDC GLUCOMTR-MCNC: 191 MG/DL — HIGH (ref 70–99)
GLUCOSE BLDC GLUCOMTR-MCNC: 90 MG/DL — SIGNIFICANT CHANGE UP (ref 70–99)
SPECIMEN SOURCE: SIGNIFICANT CHANGE UP

## 2023-03-09 PROCEDURE — 99231 SBSQ HOSP IP/OBS SF/LOW 25: CPT

## 2023-03-09 PROCEDURE — 99233 SBSQ HOSP IP/OBS HIGH 50: CPT

## 2023-03-09 PROCEDURE — 99232 SBSQ HOSP IP/OBS MODERATE 35: CPT

## 2023-03-09 RX ADMIN — AMPICILLIN SODIUM AND SULBACTAM SODIUM 200 GRAM(S): 250; 125 INJECTION, POWDER, FOR SUSPENSION INTRAMUSCULAR; INTRAVENOUS at 22:52

## 2023-03-09 RX ADMIN — Medication 2 UNIT(S): at 07:33

## 2023-03-09 RX ADMIN — Medication 81 MILLIGRAM(S): at 12:24

## 2023-03-09 RX ADMIN — Medication 2: at 17:18

## 2023-03-09 RX ADMIN — Medication 2 UNIT(S): at 17:18

## 2023-03-09 RX ADMIN — Medication 975 MILLIGRAM(S): at 06:21

## 2023-03-09 RX ADMIN — Medication 2: at 07:33

## 2023-03-09 RX ADMIN — INSULIN GLARGINE 7 UNIT(S): 100 INJECTION, SOLUTION SUBCUTANEOUS at 22:34

## 2023-03-09 RX ADMIN — OXYCODONE HYDROCHLORIDE 10 MILLIGRAM(S): 5 TABLET ORAL at 06:20

## 2023-03-09 RX ADMIN — OXYCODONE HYDROCHLORIDE 10 MILLIGRAM(S): 5 TABLET ORAL at 05:20

## 2023-03-09 RX ADMIN — Medication 975 MILLIGRAM(S): at 23:35

## 2023-03-09 RX ADMIN — SENNA PLUS 2 TABLET(S): 8.6 TABLET ORAL at 22:47

## 2023-03-09 RX ADMIN — Medication 975 MILLIGRAM(S): at 22:47

## 2023-03-09 RX ADMIN — Medication 975 MILLIGRAM(S): at 05:21

## 2023-03-09 RX ADMIN — REMDESIVIR 200 MILLIGRAM(S): 5 INJECTION INTRAVENOUS at 18:46

## 2023-03-09 NOTE — PROGRESS NOTE ADULT - SUBJECTIVE AND OBJECTIVE BOX
Patient is a 47y Male whom presented to the hospital with esrd on hd    PAST MEDICAL & SURGICAL HISTORY:  ESRD on dialysis      H/O hypotension      Diabetes mellitus      S/P BKA (below knee amputation) bilateral      AV fistula          MEDICATIONS  (STANDING):  acetaminophen     Tablet .. 975 milliGRAM(s) Oral every 8 hours  ampicillin/sulbactam  IVPB      aspirin  chewable 81 milliGRAM(s) Oral daily  dextrose 5%. 1000 milliLiter(s) (50 mL/Hr) IV Continuous <Continuous>  dextrose 5%. 1000 milliLiter(s) (100 mL/Hr) IV Continuous <Continuous>  dextrose 50% Injectable 25 Gram(s) IV Push once  dextrose 50% Injectable 12.5 Gram(s) IV Push once  dextrose 50% Injectable 25 Gram(s) IV Push once  glucagon  Injectable 1 milliGRAM(s) IntraMuscular once  insulin lispro (ADMELOG) corrective regimen sliding scale   SubCutaneous three times a day before meals  lactated ringers. 1000 milliLiter(s) (100 mL/Hr) IV Continuous <Continuous>      Allergies    No Known Drug Allergies  [place] (Rash)    Intolerances        SOCIAL HISTORY:  Denies ETOh,Smoking,     FAMILY HISTORY:  No pertinent family history in first degree relatives        REVIEW OF SYSTEMS:    CONSTITUTIONAL: No weakness, fevers or chills  NECK: No pain or stiffness  RESPIRATORY: No cough, wheezing, hemoptysis; No shortness of breath  CARDIOVASCULAR: No chest pain or palpitations  GASTROINTESTINAL: No abdominal or epigastric pain. No nausea, vomiting,                                        7.4    8.26  )-----------( 399      ( 08 Mar 2023 05:58 )             24.8       CBC Full  -  ( 08 Mar 2023 05:58 )  WBC Count : 8.26 K/uL  RBC Count : 2.80 M/uL  Hemoglobin : 7.4 g/dL  Hematocrit : 24.8 %  Platelet Count - Automated : 399 K/uL  Mean Cell Volume : 88.6 fL  Mean Cell Hemoglobin : 26.4 pg  Mean Cell Hemoglobin Concentration : 29.8 gm/dL  Auto Neutrophil # : 5.34 K/uL  Auto Lymphocyte # : 2.20 K/uL  Auto Monocyte # : 0.51 K/uL  Auto Eosinophil # : 0.12 K/uL  Auto Basophil # : 0.07 K/uL  Auto Neutrophil % : 64.7 %  Auto Lymphocyte % : 26.6 %  Auto Monocyte % : 6.2 %  Auto Eosinophil % : 1.5 %  Auto Basophil % : 0.8 %      03-08    136  |  96<L>  |  33<H>  ----------------------------<  267<H>  3.8   |  24  |  5.82<H>    Ca    8.2<L>      08 Mar 2023 05:58        CAPILLARY BLOOD GLUCOSE      POCT Blood Glucose.: 188 mg/dL (09 Mar 2023 16:54)  POCT Blood Glucose.: 90 mg/dL (09 Mar 2023 11:17)  POCT Blood Glucose.: 188 mg/dL (09 Mar 2023 07:13)  POCT Blood Glucose.: 191 mg/dL (09 Mar 2023 05:19)  POCT Blood Glucose.: 151 mg/dL (08 Mar 2023 21:45)      Vital Signs Last 24 Hrs  T(C): 36.3 (09 Mar 2023 17:41), Max: 36.9 (09 Mar 2023 09:26)  T(F): 97.3 (09 Mar 2023 17:41), Max: 98.4 (09 Mar 2023 09:26)  HR: 75 (09 Mar 2023 17:41) (70 - 82)  BP: 125/57 (09 Mar 2023 17:41) (113/40 - 135/57)  BP(mean): --  RR: 18 (09 Mar 2023 17:41) (16 - 18)  SpO2: 100% (09 Mar 2023 17:41) (100% - 100%)    Parameters below as of 09 Mar 2023 17:41  Patient On (Oxygen Delivery Method): room air                            PHYSICAL EXAM:    Constitutional: NAD  HEENT: conjunctive   clear   Neck:  No JVD  Respiratory: CTAB  Cardiovascular: S1 and S2  Gastrointestinal: BS+, soft, NT/ND  Extremities: No peripheral edema ,  Prior right 3rd digit amputation.S/P BKA (below knee amputation) bilateral  Neurological:  no focal deficits  Psychiatric: Normal mood, normal affect  : No Cancino  Skin:   Access: pos fistula

## 2023-03-09 NOTE — PROGRESS NOTE ADULT - ASSESSMENT
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 Sin/Dr. Singh) who was seen by Dr. Sin today in the office and sent in to [facility] for urgent irrigation and debridement. Patient states he missed dialysis today because he was instructed to come straight to the ED.     PAST MEDICAL & SURGICAL HISTORY:  ESRD on dialysis  H/O hypotension  Diabetes mellitus  S/P BKA (below knee amputation) bilateral  AV fistula (06 Mar 2023 17:32)            esrd on hd   will plan for hd as order    Excess fluids and waste products will be removed from your blood; your electrolytes will be balanced; your blood pressure will be controlled.      ANEMIA PLAN:  Anemia of chronic disease:  Well controlled by Aranesp  H and H subtherapeutic .  We will continue Aranesp aiming for a HCT of 32-36 %.   We will monitor Iron stores, B12 and RBC folate .      ,send blood and urine cx,serial lactate levels,monitor vitals closley,ivfs hydration,monitor urine output and renal profile,iv abx

## 2023-03-09 NOTE — PROGRESS NOTE ADULT - PROBLEM SELECTOR PLAN 5
-diabetic retinopathy.  severe proliferative diabetic retinopathy, previously on cosopt, optho outpt follow up   -ZkA6g=4.5%. FS better controlled in-house.  -c/w ISS for now and continue to monitor FS closely.  -patient on 4 units premeals and 5 units at bedtime  -c/w lantus to 7units subq at bedtime and admelog 2units qAC before meals  -will titrate up as necessary  -c/w ASA daily

## 2023-03-09 NOTE — PROGRESS NOTE ADULT - PROBLEM SELECTOR PLAN 2
-reportedly covid vaccinated x2  -asymptomatic and CXR clear  -will treat with remdesivir x3 days -- currently day #3/3

## 2023-03-09 NOTE — PROGRESS NOTE ADULT - ASSESSMENT
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 and multiple OR debridement with eventual closure, cultures with Ecoli pansensitive,  Strep angionosis and Bacteriodes fragilis, complicated Unasyn 6 week course while inpatient, now sent back by ortho for debridement as the hand was gangrenous and infected as per ortho afebrile, WBC normal, ESR: 44, CRP 23  xray  with areas of bone loss  s/p debridement 3/7 and no cultures sent  also COVID positive and CXR clear    ESRD with recent steal syndrome and R 3rd finger and forearm gas gangrene s/p amp and multiple debridement, completed a 6 week course of unasyn but after completing sent back for infected hand  s/p OR debridement 3/7 but no cultures sent   blood cx negative, MRSA PCR negative  VA duplex s/o steal syndrome  Asymptomatic COVID CXR clear    * c/w unasyn, debridement was 3/7 so day 3  * hand and forearm MRI  * f/u with vascular  * complete a 3 day course of remdesivir    The above assessment and plan was discussed with the primary team    Tori Chamorro MD  contact on teams  After 5pm and on weekends call [phone]

## 2023-03-09 NOTE — PROGRESS NOTE ADULT - ASSESSMENT
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 Vascular Surgery consulted to r/o steal syndrome.

## 2023-03-09 NOTE — PROGRESS NOTE ADULT - SUBJECTIVE AND OBJECTIVE BOX
ORTHO PROGRESS NOTE     Patient resting comfortable without complaint      Vital Signs Last 24 Hrs  T(C): 36.4 (09 Mar 2023 05:26), Max: 36.9 (08 Mar 2023 10:10)  T(F): 97.5 (09 Mar 2023 05:26), Max: 98.4 (08 Mar 2023 10:10)  HR: 72 (09 Mar 2023 05:26) (70 - 88)  BP: 122/53 (09 Mar 2023 05:26) (113/40 - 154/81)  BP(mean): --  RR: 17 (09 Mar 2023 05:26) (16 - 17)  SpO2: 100% (09 Mar 2023 05:26) (98% - 100%)    Parameters below as of 09 Mar 2023 05:26  Patient On (Oxygen Delivery Method): room air      Gen: NAD, alert and oriented  Resp: Unlabored breathing  Gen: awake, alert, NAD  Resp: no increased work of breathing  RUE  dressing c/d/i, W-> D dressing change done   + AIN/PIN/IO  C5-T1 SILT  compartments soft  radial pulse 2+               A/P:  Stable s/p Right hand and forearm irrigation and debridement    - Pain control/ Analgesia  - DVT ppx A81  -PT/OT   -NWB RUE  - FU vasc recs

## 2023-03-09 NOTE — PROGRESS NOTE ADULT - SUBJECTIVE AND OBJECTIVE BOX
Follow Up:  hand gangrene, steal syndrome    Interval History/ROS: no fever, SOB, vomiting, still with some hand pain        Allergies  No Known Drug Allergies  [place] (Rash)        ANTIMICROBIALS:  ampicillin/sulbactam  IVPB    ampicillin/sulbactam  IVPB 3 every 24 hours  remdesivir  IVPB 100 once      OTHER MEDS:  acetaminophen     Tablet .. 975 milliGRAM(s) Oral every 8 hours  aspirin  chewable 81 milliGRAM(s) Oral daily  chlorhexidine 2% Cloths 1 Application(s) Topical <User Schedule>  dextrose 5%. 1000 milliLiter(s) IV Continuous <Continuous>  dextrose 5%. 1000 milliLiter(s) IV Continuous <Continuous>  dextrose 50% Injectable 25 Gram(s) IV Push once  dextrose 50% Injectable 12.5 Gram(s) IV Push once  dextrose 50% Injectable 25 Gram(s) IV Push once  dextrose Oral Gel 15 Gram(s) Oral once PRN  epoetin deidre-epbx (RETACRIT) Injectable 78641 Unit(s) IV Push once  glucagon  Injectable 1 milliGRAM(s) IntraMuscular once  insulin glargine Injectable (LANTUS) 7 Unit(s) SubCutaneous at bedtime  insulin lispro (ADMELOG) corrective regimen sliding scale   SubCutaneous at bedtime  insulin lispro (ADMELOG) corrective regimen sliding scale   SubCutaneous three times a day before meals  insulin lispro Injectable (ADMELOG) 2 Unit(s) SubCutaneous three times a day before meals  oxyCODONE    IR 10 milliGRAM(s) Oral every 4 hours PRN  oxyCODONE    IR 5 milliGRAM(s) Oral every 4 hours PRN  senna 2 Tablet(s) Oral at bedtime      Vital Signs Last 24 Hrs  T(C): 36.7 (09 Mar 2023 13:40), Max: 36.9 (09 Mar 2023 09:26)  T(F): 98 (09 Mar 2023 13:40), Max: 98.4 (09 Mar 2023 09:26)  HR: 72 (09 Mar 2023 13:40) (70 - 82)  BP: 131/35 (09 Mar 2023 13:40) (113/40 - 154/81)  BP(mean): --  RR: 18 (09 Mar 2023 13:40) (16 - 18)  SpO2: 100% (09 Mar 2023 13:40) (98% - 100%)    Parameters below as of 09 Mar 2023 13:40  Patient On (Oxygen Delivery Method): room air        Physical Exam:  General:    NAD,  non toxic  Respiratory:    comfortable on RA  abd:     soft,      no tenderness  :   no CVAT,  no suprapubic tenderness,   no  lackey  Musculoskeletal:   b/l BKA, R hand s/p I&D with dressing, index tip with wound and no nail  vascular: no phlebitis  Skin:    no rash                          7.4    8.26  )-----------( 399      ( 08 Mar 2023 05:58 )             24.8       03-08    136  |  96<L>  |  33<H>  ----------------------------<  267<H>  3.8   |  24  |  5.82<H>    Ca    8.2<L>      08 Mar 2023 05:58            MICROBIOLOGY:  v  .Nose  03-07-23   No staphylococcus aureus isolated.  "PCR is more Sensitive for identifying MRSA/MSSA."  --  --      .Blood Blood  03-06-23   No growth to date.  --  --      .Blood Blood  03-06-23   No growth to date.  --  --                RADIOLOGY:  Images independently visualized and reviewed personally, findings as below  < from: Xray Forearm, Right (03.06.23 @ 19:22) >  IMPRESSION:  Areas of bone loss as described above. Follow-up MRI can be ordered if   clinically indicated.      < end of copied text >

## 2023-03-09 NOTE — PROGRESS NOTE ADULT - SUBJECTIVE AND OBJECTIVE BOX
CHIEF COMPLAINT: f/u right forearm/hand debridement    SUBJECTIVE / OVERNIGHT EVENTS: Patient seen and examined. No acute events overnight. Pain well controlled and patient without any complaints.     MEDICATIONS  (STANDING):  acetaminophen     Tablet .. 975 milliGRAM(s) Oral every 8 hours  ampicillin/sulbactam  IVPB      ampicillin/sulbactam  IVPB 3 Gram(s) IV Intermittent every 24 hours  aspirin  chewable 81 milliGRAM(s) Oral daily  chlorhexidine 2% Cloths 1 Application(s) Topical <User Schedule>  dextrose 5%. 1000 milliLiter(s) (50 mL/Hr) IV Continuous <Continuous>  dextrose 5%. 1000 milliLiter(s) (100 mL/Hr) IV Continuous <Continuous>  dextrose 50% Injectable 25 Gram(s) IV Push once  dextrose 50% Injectable 12.5 Gram(s) IV Push once  dextrose 50% Injectable 25 Gram(s) IV Push once  epoetin deidre-epbx (RETACRIT) Injectable 45901 Unit(s) IV Push once  glucagon  Injectable 1 milliGRAM(s) IntraMuscular once  insulin glargine Injectable (LANTUS) 7 Unit(s) SubCutaneous at bedtime  insulin lispro (ADMELOG) corrective regimen sliding scale   SubCutaneous at bedtime  insulin lispro (ADMELOG) corrective regimen sliding scale   SubCutaneous three times a day before meals  insulin lispro Injectable (ADMELOG) 2 Unit(s) SubCutaneous three times a day before meals  remdesivir  IVPB 100 milliGRAM(s) IV Intermittent once  senna 2 Tablet(s) Oral at bedtime    MEDICATIONS  (PRN):  dextrose Oral Gel 15 Gram(s) Oral once PRN Blood Glucose LESS THAN 70 milliGRAM(s)/deciliter  oxyCODONE    IR 10 milliGRAM(s) Oral every 4 hours PRN Severe Pain (7 - 10)  oxyCODONE    IR 5 milliGRAM(s) Oral every 4 hours PRN Moderate Pain (4 - 6)    VITALS:  T(F): 98.4 (03-09-23 @ 09:26), Max: 98.4 (03-09-23 @ 09:26)  HR: 82 (03-09-23 @ 09:26) (70 - 82)  BP: 135/57 (03-09-23 @ 09:26) (113/40 - 154/81)  RR: 18 (03-09-23 @ 09:26) (16 - 18)  SpO2: 100% (03-09-23 @ 09:26)    PHYSICAL EXAM:  GENERAL: NAD, well-developed  CHEST/LUNG: Clear to auscultation bilaterally; No wheeze  HEART: Regular rate and rhythm; No murmurs, rubs, or gallops  ABDOMEN: Soft, Nontender, Nondistended; Bowel sounds present  EXTREMITIES:  B/L LE amputations +BKA  SKIN: Right hand dressing C/D/I    LABS:              7.4                  136  | 24   | 33           8.26  >-----------< 399     ------------------------< 267                   24.8                 3.8  | 96   | 5.82                                         Ca 8.2   Mg x     Ph x        CAPILLARY BLOOD GLUCOSE  POCT Blood Glucose.: 188 mg/dL (09 Mar 2023 07:13)  POCT Blood Glucose.: 191 mg/dL (09 Mar 2023 05:19)  POCT Blood Glucose.: 151 mg/dL (08 Mar 2023 21:45)  POCT Blood Glucose.: 170 mg/dL (08 Mar 2023 16:38)  POCT Blood Glucose.: 137 mg/dL (08 Mar 2023 11:20)    MRSA Culture nose - negative    RADIOLOGY & ADDITIONAL TESTS:  Imaging Personally Reviewed: [x] Yes  < from: [facility] Duplex Hemodialysis Access, Right. (03.08.23 @ 14:56) >  Summary/Impressions:  1. Right brachiocephalic arteriovenous fistula appears  patent, with no hemodynamically significant stenosis  present at the anastomotic site ( cm/sec, EDV 47  cm/sec).   The proximal usable segment appears completely  thrombosed with no flow visualized on Color Doppler. The  mid to distal usable segment appears patent without  evidence of thrombosis, with collateral flow to outflow  vein.  The maximal volume flow velocity obtained within the  fistula is 867 ml/min in the mid section. The remaining  outflow vein appears patent without evidence of  thrombosis.  PVR of digits were taken prior and during exam to rule out  steal syndrome.  PVR of the first, fourth, and fifth digit seem to improve  in amplitude while arteriovenous fistula is compressed,  likely due to steal syndrome.    < end of copied text >    [ ] Consultant(s) Notes Reviewed:  [x] Care Discussed with Consultants/Other Providers: Orthopedic PA - discussed management of abx

## 2023-03-10 LAB
ALBUMIN SERPL ELPH-MCNC: 3.4 G/DL — SIGNIFICANT CHANGE UP (ref 3.3–5)
ALP SERPL-CCNC: 104 U/L — SIGNIFICANT CHANGE UP (ref 40–120)
ALT FLD-CCNC: 5 U/L — SIGNIFICANT CHANGE UP (ref 4–41)
ANION GAP SERPL CALC-SCNC: 20 MMOL/L — HIGH (ref 7–14)
AST SERPL-CCNC: 21 U/L — SIGNIFICANT CHANGE UP (ref 4–40)
BASOPHILS # BLD AUTO: 0.09 K/UL — SIGNIFICANT CHANGE UP (ref 0–0.2)
BASOPHILS NFR BLD AUTO: 1 % — SIGNIFICANT CHANGE UP (ref 0–2)
BILIRUB DIRECT SERPL-MCNC: <0.2 MG/DL — SIGNIFICANT CHANGE UP (ref 0–0.3)
BILIRUB INDIRECT FLD-MCNC: SIGNIFICANT CHANGE UP MG/DL (ref 0–1)
BILIRUB SERPL-MCNC: <0.2 MG/DL — SIGNIFICANT CHANGE UP (ref 0.2–1.2)
BUN SERPL-MCNC: 55 MG/DL — HIGH (ref 7–23)
CALCIUM SERPL-MCNC: 8.1 MG/DL — LOW (ref 8.4–10.5)
CHLORIDE SERPL-SCNC: 97 MMOL/L — LOW (ref 98–107)
CO2 SERPL-SCNC: 20 MMOL/L — LOW (ref 22–31)
CREAT SERPL-MCNC: 9.05 MG/DL — HIGH (ref 0.5–1.3)
EGFR: 7 ML/MIN/1.73M2 — LOW
EOSINOPHIL # BLD AUTO: 0.45 K/UL — SIGNIFICANT CHANGE UP (ref 0–0.5)
EOSINOPHIL NFR BLD AUTO: 5.2 % — SIGNIFICANT CHANGE UP (ref 0–6)
GLUCOSE BLDC GLUCOMTR-MCNC: 115 MG/DL — HIGH (ref 70–99)
GLUCOSE BLDC GLUCOMTR-MCNC: 153 MG/DL — HIGH (ref 70–99)
GLUCOSE BLDC GLUCOMTR-MCNC: 249 MG/DL — HIGH (ref 70–99)
GLUCOSE BLDC GLUCOMTR-MCNC: 98 MG/DL — SIGNIFICANT CHANGE UP (ref 70–99)
GLUCOSE SERPL-MCNC: 98 MG/DL — SIGNIFICANT CHANGE UP (ref 70–99)
HCT VFR BLD CALC: 25.4 % — LOW (ref 39–50)
HGB BLD-MCNC: 7.6 G/DL — LOW (ref 13–17)
IANC: 5.52 K/UL — SIGNIFICANT CHANGE UP (ref 1.8–7.4)
IMM GRANULOCYTES NFR BLD AUTO: 0.3 % — SIGNIFICANT CHANGE UP (ref 0–0.9)
LYMPHOCYTES # BLD AUTO: 2.08 K/UL — SIGNIFICANT CHANGE UP (ref 1–3.3)
LYMPHOCYTES # BLD AUTO: 24 % — SIGNIFICANT CHANGE UP (ref 13–44)
MCHC RBC-ENTMCNC: 25.9 PG — LOW (ref 27–34)
MCHC RBC-ENTMCNC: 29.9 GM/DL — LOW (ref 32–36)
MCV RBC AUTO: 86.7 FL — SIGNIFICANT CHANGE UP (ref 80–100)
MONOCYTES # BLD AUTO: 0.51 K/UL — SIGNIFICANT CHANGE UP (ref 0–0.9)
MONOCYTES NFR BLD AUTO: 5.9 % — SIGNIFICANT CHANGE UP (ref 2–14)
NEUTROPHILS # BLD AUTO: 5.52 K/UL — SIGNIFICANT CHANGE UP (ref 1.8–7.4)
NEUTROPHILS NFR BLD AUTO: 63.6 % — SIGNIFICANT CHANGE UP (ref 43–77)
NRBC # BLD: 0 /100 WBCS — SIGNIFICANT CHANGE UP (ref 0–0)
NRBC # FLD: 0 K/UL — SIGNIFICANT CHANGE UP (ref 0–0)
PLATELET # BLD AUTO: 384 K/UL — SIGNIFICANT CHANGE UP (ref 150–400)
POTASSIUM SERPL-MCNC: 5 MMOL/L — SIGNIFICANT CHANGE UP (ref 3.5–5.3)
POTASSIUM SERPL-SCNC: 5 MMOL/L — SIGNIFICANT CHANGE UP (ref 3.5–5.3)
PROT SERPL-MCNC: 6.6 G/DL — SIGNIFICANT CHANGE UP (ref 6–8.3)
RBC # BLD: 2.93 M/UL — LOW (ref 4.2–5.8)
RBC # FLD: 14.8 % — HIGH (ref 10.3–14.5)
SODIUM SERPL-SCNC: 137 MMOL/L — SIGNIFICANT CHANGE UP (ref 135–145)
WBC # BLD: 8.68 K/UL — SIGNIFICANT CHANGE UP (ref 3.8–10.5)
WBC # FLD AUTO: 8.68 K/UL — SIGNIFICANT CHANGE UP (ref 3.8–10.5)

## 2023-03-10 PROCEDURE — 99232 SBSQ HOSP IP/OBS MODERATE 35: CPT

## 2023-03-10 PROCEDURE — 99233 SBSQ HOSP IP/OBS HIGH 50: CPT

## 2023-03-10 RX ORDER — HEPARIN SODIUM 5000 [USP'U]/ML
5000 INJECTION INTRAVENOUS; SUBCUTANEOUS EVERY 12 HOURS
Refills: 0 | Status: DISCONTINUED | OUTPATIENT
Start: 2023-03-10 | End: 2023-03-15

## 2023-03-10 RX ADMIN — Medication 2 UNIT(S): at 11:32

## 2023-03-10 RX ADMIN — HEPARIN SODIUM 5000 UNIT(S): 5000 INJECTION INTRAVENOUS; SUBCUTANEOUS at 17:18

## 2023-03-10 RX ADMIN — Medication 2: at 07:35

## 2023-03-10 RX ADMIN — Medication 975 MILLIGRAM(S): at 07:34

## 2023-03-10 RX ADMIN — INSULIN GLARGINE 7 UNIT(S): 100 INJECTION, SOLUTION SUBCUTANEOUS at 22:12

## 2023-03-10 RX ADMIN — AMPICILLIN SODIUM AND SULBACTAM SODIUM 200 GRAM(S): 250; 125 INJECTION, POWDER, FOR SUSPENSION INTRAMUSCULAR; INTRAVENOUS at 22:11

## 2023-03-10 RX ADMIN — Medication 975 MILLIGRAM(S): at 22:56

## 2023-03-10 RX ADMIN — Medication 975 MILLIGRAM(S): at 22:11

## 2023-03-10 RX ADMIN — Medication 2 UNIT(S): at 07:36

## 2023-03-10 RX ADMIN — Medication 81 MILLIGRAM(S): at 17:18

## 2023-03-10 RX ADMIN — Medication 975 MILLIGRAM(S): at 08:20

## 2023-03-10 NOTE — PROGRESS NOTE ADULT - SUBJECTIVE AND OBJECTIVE BOX
Vascular Surgery Daily Progress Note    Subjective:   Patient seen at bedside this AM. Denies acute onset abdominal pain, N/V/D, fevers, chills, SOB, CP, lightheadedness    24h Events:   - as per primary team note    Objective:  Vital Signs  T(C): 36.4 (03-10 @ 16:00), Max: 37.1 (03-10 @ 09:32)  HR: 72 (03-10 @ 16:00) (71 - 88)  BP: 158/87 (03-10 @ 16:00) (116/52 - 158/87)  RR: 17 (03-10 @ 16:00) (16 - 18)  SpO2: 99% (03-10 @ 09:32) (99% - 100%)    Physical Exam:      Labs:                        7.6    8.68  )-----------( 384      ( 10 Mar 2023 16:21 )             25.4         CAPILLARY BLOOD GLUCOSE      POCT Blood Glucose.: 98 mg/dL (10 Mar 2023 11:19)  POCT Blood Glucose.: 153 mg/dL (10 Mar 2023 07:06)  POCT Blood Glucose.: 172 mg/dL (09 Mar 2023 22:25)  POCT Blood Glucose.: 188 mg/dL (09 Mar 2023 16:54)      Medications:   MEDICATIONS  (STANDING):  acetaminophen     Tablet .. 975 milliGRAM(s) Oral every 8 hours  ampicillin/sulbactam  IVPB      ampicillin/sulbactam  IVPB 3 Gram(s) IV Intermittent every 24 hours  aspirin  chewable 81 milliGRAM(s) Oral daily  chlorhexidine 2% Cloths 1 Application(s) Topical <User Schedule>  dextrose 5%. 1000 milliLiter(s) (50 mL/Hr) IV Continuous <Continuous>  dextrose 5%. 1000 milliLiter(s) (100 mL/Hr) IV Continuous <Continuous>  dextrose 50% Injectable 25 Gram(s) IV Push once  dextrose 50% Injectable 12.5 Gram(s) IV Push once  dextrose 50% Injectable 25 Gram(s) IV Push once  epoetin deidre-epbx (RETACRIT) Injectable 31295 Unit(s) IV Push once  glucagon  Injectable 1 milliGRAM(s) IntraMuscular once  heparin   Injectable 5000 Unit(s) SubCutaneous every 12 hours  insulin glargine Injectable (LANTUS) 7 Unit(s) SubCutaneous at bedtime  insulin lispro (ADMELOG) corrective regimen sliding scale   SubCutaneous at bedtime  insulin lispro (ADMELOG) corrective regimen sliding scale   SubCutaneous three times a day before meals  insulin lispro Injectable (ADMELOG) 2 Unit(s) SubCutaneous three times a day before meals  senna 2 Tablet(s) Oral at bedtime    MEDICATIONS  (PRN):  dextrose Oral Gel 15 Gram(s) Oral once PRN Blood Glucose LESS THAN 70 milliGRAM(s)/deciliter  oxyCODONE    IR 10 milliGRAM(s) Oral every 4 hours PRN Severe Pain (7 - 10)  oxyCODONE    IR 5 milliGRAM(s) Oral every 4 hours PRN Moderate Pain (4 - 6)      Imaging:       Vascular Surgery Daily Progress Note    Subjective:   Patient seen at bedside this AM. Denies acute onset abdominal pain, N/V/D, fevers, chills, SOB, CP, lightheadedness    24h Events:   - as per primary team note    Objective:  Vital Signs  T(C): 36.4 (03-10 @ 16:00), Max: 37.1 (03-10 @ 09:32)  HR: 72 (03-10 @ 16:00) (71 - 88)  BP: 158/87 (03-10 @ 16:00) (116/52 - 158/87)  RR: 17 (03-10 @ 16:00) (16 - 18)  SpO2: 99% (03-10 @ 09:32) (99% - 100%)    Physical Exam:  General: NAD  Cardiac: Regular rate  Respiratory: Nonlabored breathing  Abdominal Exam: soft, nondistended, nontender  Vascular: Palpable thrill over RUE AVF, no redness, no tenderness, forearm and hand wrapped in ace.  Muskuloskeletal: Moves all 4 extremities spontaneously  Neurological: Alert and oriented, no gross focal deficits, no motor or sensory deficits.  Psych: AOX3     Labs:                        7.6    8.68  )-----------( 384      ( 10 Mar 2023 16:21 )             25.4         CAPILLARY BLOOD GLUCOSE      POCT Blood Glucose.: 98 mg/dL (10 Mar 2023 11:19)  POCT Blood Glucose.: 153 mg/dL (10 Mar 2023 07:06)  POCT Blood Glucose.: 172 mg/dL (09 Mar 2023 22:25)  POCT Blood Glucose.: 188 mg/dL (09 Mar 2023 16:54)      Medications:   MEDICATIONS  (STANDING):  acetaminophen     Tablet .. 975 milliGRAM(s) Oral every 8 hours  ampicillin/sulbactam  IVPB      ampicillin/sulbactam  IVPB 3 Gram(s) IV Intermittent every 24 hours  aspirin  chewable 81 milliGRAM(s) Oral daily  chlorhexidine 2% Cloths 1 Application(s) Topical <User Schedule>  dextrose 5%. 1000 milliLiter(s) (50 mL/Hr) IV Continuous <Continuous>  dextrose 5%. 1000 milliLiter(s) (100 mL/Hr) IV Continuous <Continuous>  dextrose 50% Injectable 25 Gram(s) IV Push once  dextrose 50% Injectable 12.5 Gram(s) IV Push once  dextrose 50% Injectable 25 Gram(s) IV Push once  epoetin deidre-epbx (RETACRIT) Injectable 94484 Unit(s) IV Push once  glucagon  Injectable 1 milliGRAM(s) IntraMuscular once  heparin   Injectable 5000 Unit(s) SubCutaneous every 12 hours  insulin glargine Injectable (LANTUS) 7 Unit(s) SubCutaneous at bedtime  insulin lispro (ADMELOG) corrective regimen sliding scale   SubCutaneous at bedtime  insulin lispro (ADMELOG) corrective regimen sliding scale   SubCutaneous three times a day before meals  insulin lispro Injectable (ADMELOG) 2 Unit(s) SubCutaneous three times a day before meals  senna 2 Tablet(s) Oral at bedtime    MEDICATIONS  (PRN):  dextrose Oral Gel 15 Gram(s) Oral once PRN Blood Glucose LESS THAN 70 milliGRAM(s)/deciliter  oxyCODONE    IR 10 milliGRAM(s) Oral every 4 hours PRN Severe Pain (7 - 10)  oxyCODONE    IR 5 milliGRAM(s) Oral every 4 hours PRN Moderate Pain (4 - 6)      Imaging:

## 2023-03-10 NOTE — PROGRESS NOTE ADULT - ASSESSMENT
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 Sin/Dr. Singh) who was seen by Dr. Sin today in the office and sent in to [facility] for urgent irrigation and debridement. Patient states he missed dialysis today because he was instructed to come straight to the ED.     PAST MEDICAL & SURGICAL HISTORY:  ESRD on dialysis  H/O hypotension  Diabetes mellitus  S/P BKA (below knee amputation) bilateral  AV fistula (06 Mar 2023 17:32)            esrd on hd   will plan for hd as order    Excess fluids and waste products will be removed from your blood; your electrolytes will be balanced; your blood pressure will be controlled.      ANEMIA PLAN:  Anemia of chronic disease:  Well controlled by Aranesp  H and H subtherapeutic .  We will continue Aranesp aiming for a HCT of 32-36 %.   We will monitor Iron stores, B12 and RBC folate .      ,send blood and urine cx,serial lactate levels,monitor vitals closley,ivfs hydration,monitor urine output and renal profile,iv abx

## 2023-03-10 NOTE — PROGRESS NOTE ADULT - PROBLEM SELECTOR PLAN 4
-f/u renal recs and plans for HD   -renally dose medications   -anemia of renal disease, c/w epo during HD as per renal  -renal restricted diet w/ nepro supplements  -plan for HD later today (3/10/23)

## 2023-03-10 NOTE — PROGRESS NOTE ADULT - ASSESSMENT
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 Patient POD1 s/p right hand and forearm irrigation and debridement. Vascular Surgery consulted to r/o steal syndrome.    Plan/recommendations  - No acute vascular surgical intervention  - we would like to see pictures of the right hand wound  - rest of care per primary team, will follow    Vascular Surgery  [id number]

## 2023-03-10 NOTE — PROGRESS NOTE ADULT - SUBJECTIVE AND OBJECTIVE BOX
Patient is a 47y Male whom presented to the hospital with esrd on hd    PAST MEDICAL & SURGICAL HISTORY:  ESRD on dialysis      H/O hypotension      Diabetes mellitus      S/P BKA (below knee amputation) bilateral      AV fistula          MEDICATIONS  (STANDING):  acetaminophen     Tablet .. 975 milliGRAM(s) Oral every 8 hours  ampicillin/sulbactam  IVPB      aspirin  chewable 81 milliGRAM(s) Oral daily  dextrose 5%. 1000 milliLiter(s) (50 mL/Hr) IV Continuous <Continuous>  dextrose 5%. 1000 milliLiter(s) (100 mL/Hr) IV Continuous <Continuous>  dextrose 50% Injectable 25 Gram(s) IV Push once  dextrose 50% Injectable 12.5 Gram(s) IV Push once  dextrose 50% Injectable 25 Gram(s) IV Push once  glucagon  Injectable 1 milliGRAM(s) IntraMuscular once  insulin lispro (ADMELOG) corrective regimen sliding scale   SubCutaneous three times a day before meals  lactated ringers. 1000 milliLiter(s) (100 mL/Hr) IV Continuous <Continuous>      Allergies    No Known Drug Allergies  [place] (Rash)    Intolerances        SOCIAL HISTORY:  Denies ETOh,Smoking,     FAMILY HISTORY:  No pertinent family history in first degree relatives        REVIEW OF SYSTEMS:    CONSTITUTIONAL: No weakness, fevers or chills  NECK: No pain or stiffness  RESPIRATORY: No cough, wheezing, hemoptysis; No shortness of breath  CARDIOVASCULAR: No chest pain or palpitations  GASTROINTESTINAL: No abdominal or epigastric pain. No nausea, vomiting,                                                        7.6    8.68  )-----------( 384      ( 10 Mar 2023 16:21 )             25.4       CBC Full  -  ( 10 Mar 2023 16:21 )  WBC Count : 8.68 K/uL  RBC Count : 2.93 M/uL  Hemoglobin : 7.6 g/dL  Hematocrit : 25.4 %  Platelet Count - Automated : 384 K/uL  Mean Cell Volume : 86.7 fL  Mean Cell Hemoglobin : 25.9 pg  Mean Cell Hemoglobin Concentration : 29.9 gm/dL  Auto Neutrophil # : 5.52 K/uL  Auto Lymphocyte # : 2.08 K/uL  Auto Monocyte # : 0.51 K/uL  Auto Eosinophil # : 0.45 K/uL  Auto Basophil # : 0.09 K/uL  Auto Neutrophil % : 63.6 %  Auto Lymphocyte % : 24.0 %  Auto Monocyte % : 5.9 %  Auto Eosinophil % : 5.2 %  Auto Basophil % : 1.0 %      03-10    137  |  97<L>  |  55<H>  ----------------------------<  98  5.0   |  20<L>  |  9.05<H>    Ca    8.1<L>      10 Mar 2023 16:21    TPro  6.6  /  Alb  3.4  /  TBili  <0.2  /  DBili  <0.2  /  AST  21  /  ALT  5   /  AlkPhos  104  03-10      CAPILLARY BLOOD GLUCOSE      POCT Blood Glucose.: 115 mg/dL (10 Mar 2023 16:41)  POCT Blood Glucose.: 98 mg/dL (10 Mar 2023 11:19)  POCT Blood Glucose.: 153 mg/dL (10 Mar 2023 07:06)  POCT Blood Glucose.: 172 mg/dL (09 Mar 2023 22:25)      Vital Signs Last 24 Hrs  T(C): 36.4 (10 Mar 2023 16:00), Max: 37.1 (10 Mar 2023 09:32)  T(F): 97.5 (10 Mar 2023 16:00), Max: 98.7 (10 Mar 2023 09:32)  HR: 72 (10 Mar 2023 16:00) (71 - 88)  BP: 158/87 (10 Mar 2023 16:00) (116/52 - 158/87)  BP(mean): --  RR: 17 (10 Mar 2023 16:00) (16 - 18)  SpO2: 99% (10 Mar 2023 09:32) (99% - 100%)    Parameters below as of 10 Mar 2023 16:00  Patient On (Oxygen Delivery Method): room air                              PHYSICAL EXAM:    Constitutional: NAD  HEENT: conjunctive   clear   Neck:  No JVD  Respiratory: CTAB  Cardiovascular: S1 and S2  Gastrointestinal: BS+, soft, NT/ND  Extremities: No peripheral edema ,  Prior right 3rd digit amputation.S/P BKA (below knee amputation) bilateral  Neurological:  no focal deficits  Psychiatric: Normal mood, normal affect  : No [place]  Skin:   Access: pos fistula

## 2023-03-10 NOTE — PROGRESS NOTE ADULT - ASSESSMENT
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 Vascular Surgery consulted to r/o steal syndrome.

## 2023-03-10 NOTE — PROGRESS NOTE ADULT - PROBLEM SELECTOR PLAN 2
-reportedly covid vaccinated x2  -asymptomatic and CXR clear  -completed 3 day course of remdesivir on 3/9/23

## 2023-03-10 NOTE — PROGRESS NOTE ADULT - ASSESSMENT
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 and multiple OR debridement with eventual closure, cultures with Ecoli pansensitive,  Strep angionosis and Bacteriodes fragilis, complicated Unasyn 6 week course while inpatient, now sent back by ortho for debridement as the hand was gangrenous and infected as per ortho afebrile, WBC normal, ESR: 44, CRP 23  xray  with areas of bone loss  s/p debridement 3/7 and no cultures sent  also COVID positive and CXR clear    ESRD with recent steal syndrome and R 3rd finger and forearm gas gangrene s/p amp and multiple debridement, completed a 6 week course of unasyn but after completing sent back for infected hand  s/p OR debridement 3/7 but no cultures sent   blood cx negative, MRSA PCR negative  VA duplex s/o steal syndrome  Asymptomatic COVID CXR clear s/p 3 days of remdesivir    * c/w unasyn, debridement was 3/7 so day 4  * hand and forearm MRI for duration of antibiotics  * f/u with vascular      The above assessment and plan was discussed with the primary team    Tori Chamorro MD  contact on teams  After 5pm and on weekends call [phone]

## 2023-03-10 NOTE — PROGRESS NOTE ADULT - SUBJECTIVE AND OBJECTIVE BOX
ORTHO PROGRESS NOTE     Patient resting comfortably  without complaint      Vital Signs Last 24 Hrs  T(C): 36.6 (09 Mar 2023 22:30), Max: 36.9 (09 Mar 2023 09:26)  T(F): 97.8 (09 Mar 2023 22:30), Max: 98.4 (09 Mar 2023 09:26)  HR: 71 (09 Mar 2023 22:30) (71 - 82)  BP: 116/52 (09 Mar 2023 22:30) (116/52 - 135/57)  BP(mean): --  RR: 18 (09 Mar 2023 22:30) (18 - 18)  SpO2: 100% (09 Mar 2023 22:30) (100% - 100%)    Parameters below as of 09 Mar 2023 22:30  Patient On (Oxygen Delivery Method): room air    Gen: NAD, alert and oriented  Resp: Unlabored breathing  Gen: awake, alert, NAD  Resp: no increased work of breathing  RUE  dressing c/d/i, W-> D dressing change done   + AIN/PIN/IO  C5-T1 SILT  compartments soft  radial pulse 2+               A/P:  Stable s/p Right hand and forearm irrigation and debridement    - Pain control/ Analgesia  - DVT ppx A81  -PT/OT   -NWB RUE  - FU vasc recs

## 2023-03-10 NOTE — PROGRESS NOTE ADULT - SUBJECTIVE AND OBJECTIVE BOX
CHIEF COMPLAINT: f/u right forearm/hand debridement    SUBJECTIVE / OVERNIGHT EVENTS: Patient seen and examined. No acute events overnight. Pain well controlled and patient without any complaints. Patient refused HD yesterday so plan is for him to get a session today.    MEDICATIONS  (STANDING):  acetaminophen     Tablet .. 975 milliGRAM(s) Oral every 8 hours  ampicillin/sulbactam  IVPB      ampicillin/sulbactam  IVPB 3 Gram(s) IV Intermittent every 24 hours  aspirin  chewable 81 milliGRAM(s) Oral daily  chlorhexidine 2% Cloths 1 Application(s) Topical <User Schedule>  dextrose 5%. 1000 milliLiter(s) (50 mL/Hr) IV Continuous <Continuous>  dextrose 5%. 1000 milliLiter(s) (100 mL/Hr) IV Continuous <Continuous>  dextrose 50% Injectable 25 Gram(s) IV Push once  dextrose 50% Injectable 12.5 Gram(s) IV Push once  dextrose 50% Injectable 25 Gram(s) IV Push once  epoetin deidre-epbx (RETACRIT) Injectable 94228 Unit(s) IV Push once  glucagon  Injectable 1 milliGRAM(s) IntraMuscular once  insulin glargine Injectable (LANTUS) 7 Unit(s) SubCutaneous at bedtime  insulin lispro (ADMELOG) corrective regimen sliding scale   SubCutaneous at bedtime  insulin lispro (ADMELOG) corrective regimen sliding scale   SubCutaneous three times a day before meals  insulin lispro Injectable (ADMELOG) 2 Unit(s) SubCutaneous three times a day before meals  senna 2 Tablet(s) Oral at bedtime    MEDICATIONS  (PRN):  dextrose Oral Gel 15 Gram(s) Oral once PRN Blood Glucose LESS THAN 70 milliGRAM(s)/deciliter  oxyCODONE    IR 10 milliGRAM(s) Oral every 4 hours PRN Severe Pain (7 - 10)  oxyCODONE    IR 5 milliGRAM(s) Oral every 4 hours PRN Moderate Pain (4 - 6)    VITALS:  T(F): 98.7 (03-10-23 @ 09:32), Max: 98.7 (03-10-23 @ 09:32)  HR: 88 (03-10-23 @ 09:32) (71 - 88)  BP: 127/78 (03-10-23 @ 09:32) (116/52 - 132/58)  RR: 17 (03-10-23 @ 09:32) (16 - 18)  SpO2: 99% (03-10-23 @ 09:32)    PHYSICAL EXAM:  GENERAL: NAD, well-developed  CHEST/LUNG: Clear to auscultation bilaterally; No wheeze  HEART: Regular rate and rhythm; No murmurs, rubs, or gallops  ABDOMEN: Soft, Nontender, Nondistended; Bowel sounds present  EXTREMITIES:  B/L LE amputations +BKA  SKIN: Right hand dressing C/D/I    LABS:    CAPILLARY BLOOD GLUCOSE  POCT Blood Glucose.: 98 mg/dL (10 Mar 2023 11:19)  POCT Blood Glucose.: 153 mg/dL (10 Mar 2023 07:06)  POCT Blood Glucose.: 172 mg/dL (09 Mar 2023 22:25)  POCT Blood Glucose.: 188 mg/dL (09 Mar 2023 16:54)    [ ] Consultant(s) Notes Reviewed:  [x] Care Discussed with Consultants/Other Providers: Orthopedic PA - discussed plan for HD and f/u MRI

## 2023-03-10 NOTE — PROGRESS NOTE ADULT - PROBLEM SELECTOR PLAN 5
-diabetic retinopathy.  severe proliferative diabetic retinopathy, previously on cosopt, optho outpt follow up   -DqM6m=0.5%. FS better controlled in-house.  -c/w ISS for now and continue to monitor FS closely.  -patient on 4 units premeals and 5 units at bedtime  -c/w lantus to 7units subq at bedtime and admelog 2units qAC before meals  -will titrate up as necessary  -c/w ASA daily

## 2023-03-10 NOTE — PROGRESS NOTE ADULT - SUBJECTIVE AND OBJECTIVE BOX
Follow Up:  hand gangrene, steal syndrome    Interval History/ROS: no fever, SOB, vomiting, pain, controlled          Allergies  No Known Drug Allergies  [place] (Rash)        ANTIMICROBIALS:  ampicillin/sulbactam  IVPB    ampicillin/sulbactam  IVPB 3 every 24 hours      OTHER MEDS:  acetaminophen     Tablet .. 975 milliGRAM(s) Oral every 8 hours  aspirin  chewable 81 milliGRAM(s) Oral daily  chlorhexidine 2% Cloths 1 Application(s) Topical <User Schedule>  dextrose 5%. 1000 milliLiter(s) IV Continuous <Continuous>  dextrose 5%. 1000 milliLiter(s) IV Continuous <Continuous>  dextrose 50% Injectable 25 Gram(s) IV Push once  dextrose 50% Injectable 12.5 Gram(s) IV Push once  dextrose 50% Injectable 25 Gram(s) IV Push once  dextrose Oral Gel 15 Gram(s) Oral once PRN  epoetin deidre-epbx (RETACRIT) Injectable 34445 Unit(s) IV Push once  glucagon  Injectable 1 milliGRAM(s) IntraMuscular once  insulin glargine Injectable (LANTUS) 7 Unit(s) SubCutaneous at bedtime  insulin lispro (ADMELOG) corrective regimen sliding scale   SubCutaneous at bedtime  insulin lispro (ADMELOG) corrective regimen sliding scale   SubCutaneous three times a day before meals  insulin lispro Injectable (ADMELOG) 2 Unit(s) SubCutaneous three times a day before meals  oxyCODONE    IR 10 milliGRAM(s) Oral every 4 hours PRN  oxyCODONE    IR 5 milliGRAM(s) Oral every 4 hours PRN  senna 2 Tablet(s) Oral at bedtime      Vital Signs Last 24 Hrs  T(C): 37.1 (10 Mar 2023 09:32), Max: 37.1 (10 Mar 2023 09:32)  T(F): 98.7 (10 Mar 2023 09:32), Max: 98.7 (10 Mar 2023 09:32)  HR: 88 (10 Mar 2023 09:32) (71 - 88)  BP: 127/78 (10 Mar 2023 09:32) (116/52 - 132/58)  BP(mean): --  RR: 17 (10 Mar 2023 09:32) (16 - 18)  SpO2: 99% (10 Mar 2023 09:32) (99% - 100%)    Parameters below as of 10 Mar 2023 09:32  Patient On (Oxygen Delivery Method): room air        Physical Exam:  General:    NAD,  non toxic  Respiratory:    comfortable on RA  abd:     soft,      no tenderness  :   no CVAT,  no suprapubic tenderness,   no  lackey  Musculoskeletal:   b/l BKA, R hand s/p I&D with dressing, index tip with wound and no nail  vascular: no phlebitis  Skin:    no rash                    MICROBIOLOGY:  v  .Nose  03-07-23   No staphylococcus aureus isolated.  "PCR is more Sensitive for identifying MRSA/MSSA."  --  --      .Blood Blood  03-06-23   No growth to date.  --  --      .Blood Blood  03-06-23   No growth to date.  --  --                RADIOLOGY:  Images independently visualized and reviewed personally, findings as below  < from: Xray Forearm, Right (03.06.23 @ 19:22) >  IMPRESSION:  Areas of bone loss as described above. Follow-up MRI can be ordered if   clinically indicated.    < end of copied text >

## 2023-03-11 LAB
ANION GAP SERPL CALC-SCNC: 18 MMOL/L — HIGH (ref 7–14)
BASOPHILS # BLD AUTO: 0.09 K/UL — SIGNIFICANT CHANGE UP (ref 0–0.2)
BASOPHILS NFR BLD AUTO: 0.8 % — SIGNIFICANT CHANGE UP (ref 0–2)
BUN SERPL-MCNC: 35 MG/DL — HIGH (ref 7–23)
CALCIUM SERPL-MCNC: 8.6 MG/DL — SIGNIFICANT CHANGE UP (ref 8.4–10.5)
CHLORIDE SERPL-SCNC: 97 MMOL/L — LOW (ref 98–107)
CO2 SERPL-SCNC: 25 MMOL/L — SIGNIFICANT CHANGE UP (ref 22–31)
CREAT SERPL-MCNC: 7.05 MG/DL — HIGH (ref 0.5–1.3)
EGFR: 9 ML/MIN/1.73M2 — LOW
EOSINOPHIL # BLD AUTO: 0.42 K/UL — SIGNIFICANT CHANGE UP (ref 0–0.5)
EOSINOPHIL NFR BLD AUTO: 3.7 % — SIGNIFICANT CHANGE UP (ref 0–6)
GLUCOSE BLDC GLUCOMTR-MCNC: 112 MG/DL — HIGH (ref 70–99)
GLUCOSE BLDC GLUCOMTR-MCNC: 138 MG/DL — HIGH (ref 70–99)
GLUCOSE BLDC GLUCOMTR-MCNC: 177 MG/DL — HIGH (ref 70–99)
GLUCOSE BLDC GLUCOMTR-MCNC: 234 MG/DL — HIGH (ref 70–99)
GLUCOSE SERPL-MCNC: 86 MG/DL — SIGNIFICANT CHANGE UP (ref 70–99)
HCT VFR BLD CALC: 31.8 % — LOW (ref 39–50)
HGB BLD-MCNC: 9.6 G/DL — LOW (ref 13–17)
IANC: 7.2 K/UL — SIGNIFICANT CHANGE UP (ref 1.8–7.4)
IMM GRANULOCYTES NFR BLD AUTO: 0.4 % — SIGNIFICANT CHANGE UP (ref 0–0.9)
LYMPHOCYTES # BLD AUTO: 2.62 K/UL — SIGNIFICANT CHANGE UP (ref 1–3.3)
LYMPHOCYTES # BLD AUTO: 22.8 % — SIGNIFICANT CHANGE UP (ref 13–44)
MAGNESIUM SERPL-MCNC: 2.2 MG/DL — SIGNIFICANT CHANGE UP (ref 1.6–2.6)
MCHC RBC-ENTMCNC: 26.4 PG — LOW (ref 27–34)
MCHC RBC-ENTMCNC: 30.2 GM/DL — LOW (ref 32–36)
MCV RBC AUTO: 87.4 FL — SIGNIFICANT CHANGE UP (ref 80–100)
MONOCYTES # BLD AUTO: 1.12 K/UL — HIGH (ref 0–0.9)
MONOCYTES NFR BLD AUTO: 9.7 % — SIGNIFICANT CHANGE UP (ref 2–14)
NEUTROPHILS # BLD AUTO: 7.2 K/UL — SIGNIFICANT CHANGE UP (ref 1.8–7.4)
NEUTROPHILS NFR BLD AUTO: 62.6 % — SIGNIFICANT CHANGE UP (ref 43–77)
NRBC # BLD: 0 /100 WBCS — SIGNIFICANT CHANGE UP (ref 0–0)
NRBC # FLD: 0 K/UL — SIGNIFICANT CHANGE UP (ref 0–0)
PHOSPHATE SERPL-MCNC: 5.7 MG/DL — HIGH (ref 2.5–4.5)
PLATELET # BLD AUTO: 403 K/UL — HIGH (ref 150–400)
POTASSIUM SERPL-MCNC: 4.4 MMOL/L — SIGNIFICANT CHANGE UP (ref 3.5–5.3)
POTASSIUM SERPL-SCNC: 4.4 MMOL/L — SIGNIFICANT CHANGE UP (ref 3.5–5.3)
RBC # BLD: 3.64 M/UL — LOW (ref 4.2–5.8)
RBC # FLD: 15.8 % — HIGH (ref 10.3–14.5)
SODIUM SERPL-SCNC: 140 MMOL/L — SIGNIFICANT CHANGE UP (ref 135–145)
WBC # BLD: 11.5 K/UL — HIGH (ref 3.8–10.5)
WBC # FLD AUTO: 11.5 K/UL — HIGH (ref 3.8–10.5)

## 2023-03-11 PROCEDURE — 99233 SBSQ HOSP IP/OBS HIGH 50: CPT

## 2023-03-11 RX ADMIN — Medication 975 MILLIGRAM(S): at 08:25

## 2023-03-11 RX ADMIN — Medication 975 MILLIGRAM(S): at 17:53

## 2023-03-11 RX ADMIN — Medication 975 MILLIGRAM(S): at 07:37

## 2023-03-11 RX ADMIN — Medication 2 UNIT(S): at 11:27

## 2023-03-11 RX ADMIN — HEPARIN SODIUM 5000 UNIT(S): 5000 INJECTION INTRAVENOUS; SUBCUTANEOUS at 17:12

## 2023-03-11 RX ADMIN — Medication 975 MILLIGRAM(S): at 17:13

## 2023-03-11 RX ADMIN — Medication 4: at 07:36

## 2023-03-11 RX ADMIN — INSULIN GLARGINE 7 UNIT(S): 100 INJECTION, SOLUTION SUBCUTANEOUS at 22:15

## 2023-03-11 RX ADMIN — HEPARIN SODIUM 5000 UNIT(S): 5000 INJECTION INTRAVENOUS; SUBCUTANEOUS at 07:37

## 2023-03-11 RX ADMIN — Medication 2 UNIT(S): at 07:36

## 2023-03-11 RX ADMIN — AMPICILLIN SODIUM AND SULBACTAM SODIUM 200 GRAM(S): 250; 125 INJECTION, POWDER, FOR SUSPENSION INTRAMUSCULAR; INTRAVENOUS at 22:15

## 2023-03-11 RX ADMIN — Medication 81 MILLIGRAM(S): at 11:27

## 2023-03-11 RX ADMIN — Medication 2: at 17:12

## 2023-03-11 RX ADMIN — Medication 2 UNIT(S): at 17:12

## 2023-03-11 NOTE — PROGRESS NOTE ADULT - PROBLEM SELECTOR PLAN 5
-diabetic retinopathy.  severe proliferative diabetic retinopathy, previously on cosopt, optho outpt follow up   -DhM3f=6.5%. FS better controlled in-house.  -c/w ISS for now and continue to monitor FS closely.  -patient on 4 units premeals and 5 units at bedtime  -c/w lantus to 7units subq at bedtime and admelog 2units qAC before meals  -will titrate up as necessary  -c/w ASA daily

## 2023-03-11 NOTE — PROGRESS NOTE ADULT - SUBJECTIVE AND OBJECTIVE BOX
[facility] Division of Hospital Medicine  Nae Grimaldo MD  Pager ([facility], 8A-5P): [phone]  Other Times:  [phone]      SUBJECTIVE / OVERNIGHT EVENTS: Pt in good spirits this am, had dressing changed this am.     MEDICATIONS  (STANDING):  acetaminophen     Tablet .. 975 milliGRAM(s) Oral every 8 hours  ampicillin/sulbactam  IVPB      ampicillin/sulbactam  IVPB 3 Gram(s) IV Intermittent every 24 hours  aspirin  chewable 81 milliGRAM(s) Oral daily  dextrose 5%. 1000 milliLiter(s) (50 mL/Hr) IV Continuous <Continuous>  dextrose 5%. 1000 milliLiter(s) (100 mL/Hr) IV Continuous <Continuous>  dextrose 50% Injectable 25 Gram(s) IV Push once  dextrose 50% Injectable 12.5 Gram(s) IV Push once  dextrose 50% Injectable 25 Gram(s) IV Push once  epoetin deidre-epbx (RETACRIT) Injectable 19437 Unit(s) IV Push once  glucagon  Injectable 1 milliGRAM(s) IntraMuscular once  heparin   Injectable 5000 Unit(s) SubCutaneous every 12 hours  insulin glargine Injectable (LANTUS) 7 Unit(s) SubCutaneous at bedtime  insulin lispro (ADMELOG) corrective regimen sliding scale   SubCutaneous at bedtime  insulin lispro (ADMELOG) corrective regimen sliding scale   SubCutaneous three times a day before meals  insulin lispro Injectable (ADMELOG) 2 Unit(s) SubCutaneous three times a day before meals  senna 2 Tablet(s) Oral at bedtime    MEDICATIONS  (PRN):  dextrose Oral Gel 15 Gram(s) Oral once PRN Blood Glucose LESS THAN 70 milliGRAM(s)/deciliter  oxyCODONE    IR 10 milliGRAM(s) Oral every 4 hours PRN Severe Pain (7 - 10)  oxyCODONE    IR 5 milliGRAM(s) Oral every 4 hours PRN Moderate Pain (4 - 6)      I&O's Summary    10 Mar 2023 07:01  -  11 Mar 2023 07:00  --------------------------------------------------------  IN: 400 mL / OUT: 2100 mL / NET: -1700 mL    11 Mar 2023 06:01  -  11 Mar 2023 14:40  --------------------------------------------------------  IN: 300 mL / OUT: 0 mL / NET: 300 mL        PHYSICAL EXAM:  Vital Signs Last 24 Hrs  T(C): 37.1 (11 Mar 2023 09:53), Max: 37.1 (11 Mar 2023 07:30)  T(F): 98.7 (11 Mar 2023 09:53), Max: 98.7 (11 Mar 2023 07:30)  HR: 73 (11 Mar 2023 09:53) (72 - 81)  BP: 102/52 (11 Mar 2023 09:53) (102/52 - 158/87)  BP(mean): --  RR: 18 (11 Mar 2023 09:53) (17 - 18)  SpO2: 100% (11 Mar 2023 09:53) (100% - 100%)    Parameters below as of 11 Mar 2023 09:53  Patient On (Oxygen Delivery Method): room air        PHYSICAL EXAM:  GENERAL: NAD, well-developed  CHEST/LUNG: Clear to auscultation bilaterally; No wheeze  HEART: Regular rate and rhythm; No murmurs, rubs, or gallops  ABDOMEN: Soft, Nontender, Nondistended; Bowel sounds present  EXTREMITIES:  B/L LE amputations +BKA  SKIN: Right hand dressing C/D/I    LABS:                        9.6    11.50 )-----------( 403      ( 11 Mar 2023 11:20 )             31.8     03-11    140  |  97<L>  |  35<H>  ----------------------------<  86  4.4   |  25  |  7.05<H>    Ca    8.6      11 Mar 2023 11:20    TPro  6.6  /  Alb  3.4  /  TBili  <0.2  /  DBili  <0.2  /  AST  21  /  ALT  5   /  AlkPhos  104  03-10                RADIOLOGY & ADDITIONAL TESTS:  Results Reviewed:   Imaging Personally Reviewed:  Electrocardiogram Personally Reviewed:    COORDINATION OF CARE:  Care Discussed with Consultants/Other Providers [Y/N]: Ortho  Prior or Outpatient Records Reviewed [Y/N]:

## 2023-03-11 NOTE — PROGRESS NOTE ADULT - ASSESSMENT
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 Vascular Surgery consulted to r/o steal syndrome.

## 2023-03-11 NOTE — PROGRESS NOTE ADULT - ASSESSMENT
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 Sin/Dr. Singh) who was seen by Dr. Sin today in the office and sent in to [facility] for urgent irrigation and debridement. Patient states he missed dialysis today because he was instructed to come straight to the ED.     PAST MEDICAL & SURGICAL HISTORY:  ESRD on dialysis  H/O hypotension  Diabetes mellitus  S/P BKA (below knee amputation) bilateral  AV fistula (06 Mar 2023 17:32)            esrd on hd   will plan for hd as order    Excess fluids and waste products will be removed from your blood; your electrolytes will be balanced; your blood pressure will be controlled.      ANEMIA PLAN:  Anemia of chronic disease:  Well controlled by Aranesp  H and H subtherapeutic .  We will continue Aranesp aiming for a HCT of 32-36 %.   We will monitor Iron stores, B12 and RBC folate .      ,send blood and urine cx,serial lactate levels,monitor vitals closley,ivfs hydration,monitor urine output and renal profile,iv abx

## 2023-03-11 NOTE — PROGRESS NOTE ADULT - SUBJECTIVE AND OBJECTIVE BOX
Patient is a 47y Male whom presented to the hospital with esrd on hd    PAST MEDICAL & SURGICAL HISTORY:  ESRD on dialysis      H/O hypotension      Diabetes mellitus      S/P BKA (below knee amputation) bilateral      AV fistula          MEDICATIONS  (STANDING):  acetaminophen     Tablet .. 975 milliGRAM(s) Oral every 8 hours  ampicillin/sulbactam  IVPB      aspirin  chewable 81 milliGRAM(s) Oral daily  dextrose 5%. 1000 milliLiter(s) (50 mL/Hr) IV Continuous <Continuous>  dextrose 5%. 1000 milliLiter(s) (100 mL/Hr) IV Continuous <Continuous>  dextrose 50% Injectable 25 Gram(s) IV Push once  dextrose 50% Injectable 12.5 Gram(s) IV Push once  dextrose 50% Injectable 25 Gram(s) IV Push once  glucagon  Injectable 1 milliGRAM(s) IntraMuscular once  insulin lispro (ADMELOG) corrective regimen sliding scale   SubCutaneous three times a day before meals  lactated ringers. 1000 milliLiter(s) (100 mL/Hr) IV Continuous <Continuous>      Allergies    No Known Drug Allergies  [place] (Rash)    Intolerances        SOCIAL HISTORY:  Denies ETOh,Smoking,     FAMILY HISTORY:  No pertinent family history in first degree relatives        REVIEW OF SYSTEMS:    CONSTITUTIONAL: No weakness, fevers or chills  NECK: No pain or stiffness  RESPIRATORY: No cough, wheezing, hemoptysis; No shortness of breath  CARDIOVASCULAR: No chest pain or palpitations  GASTROINTESTINAL: No abdominal or epigastric pain. No nausea, vomiting,                                                              9.6    11.50 )-----------( 403      ( 11 Mar 2023 11:20 )             31.8       CBC Full  -  ( 11 Mar 2023 11:20 )  WBC Count : 11.50 K/uL  RBC Count : 3.64 M/uL  Hemoglobin : 9.6 g/dL  Hematocrit : 31.8 %  Platelet Count - Automated : 403 K/uL  Mean Cell Volume : 87.4 fL  Mean Cell Hemoglobin : 26.4 pg  Mean Cell Hemoglobin Concentration : 30.2 gm/dL  Auto Neutrophil # : 7.20 K/uL  Auto Lymphocyte # : 2.62 K/uL  Auto Monocyte # : 1.12 K/uL  Auto Eosinophil # : 0.42 K/uL  Auto Basophil # : 0.09 K/uL  Auto Neutrophil % : 62.6 %  Auto Lymphocyte % : 22.8 %  Auto Monocyte % : 9.7 %  Auto Eosinophil % : 3.7 %  Auto Basophil % : 0.8 %      03-11    140  |  97<L>  |  35<H>  ----------------------------<  86  4.4   |  25  |  7.05<H>    Ca    8.6      11 Mar 2023 11:20  Phos  5.7     03-11  Mg     2.20     03-11    TPro  6.6  /  Alb  3.4  /  TBili  <0.2  /  DBili  <0.2  /  AST  21  /  ALT  5   /  AlkPhos  104  03-10      CAPILLARY BLOOD GLUCOSE      POCT Blood Glucose.: 177 mg/dL (11 Mar 2023 16:43)  POCT Blood Glucose.: 112 mg/dL (11 Mar 2023 11:20)  POCT Blood Glucose.: 234 mg/dL (11 Mar 2023 07:28)  POCT Blood Glucose.: 249 mg/dL (10 Mar 2023 21:52)      Vital Signs Last 24 Hrs  T(C): 36.8 (11 Mar 2023 18:12), Max: 37.1 (11 Mar 2023 07:30)  T(F): 98.3 (11 Mar 2023 18:12), Max: 98.7 (11 Mar 2023 07:30)  HR: 75 (11 Mar 2023 18:12) (73 - 81)  BP: 152/54 (11 Mar 2023 18:12) (102/52 - 152/54)  BP(mean): --  RR: 18 (11 Mar 2023 18:12) (17 - 18)  SpO2: 100% (11 Mar 2023 18:12) (100% - 100%)    Parameters below as of 11 Mar 2023 18:12  Patient On (Oxygen Delivery Method): room air                                PHYSICAL EXAM:    Constitutional: NAD  HEENT: conjunctive   clear   Neck:  No JVD  Respiratory: CTAB  Cardiovascular: S1 and S2  Gastrointestinal: BS+, soft, NT/ND  Extremities: No peripheral edema ,  Prior right 3rd digit amputation.S/P BKA (below knee amputation) bilateral  Neurological:  no focal deficits  Psychiatric: Normal mood, normal affect  : No Cancino  Skin:   Access: pos fistula

## 2023-03-11 NOTE — PROGRESS NOTE ADULT - ASSESSMENT
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 Patient POD1 s/p right hand and forearm irrigation and debridement. Vascular Surgery consulted to r/o steal syndrome.    Plan/recommendations  - AVF duplex reviewed      - R hand asymptomatic, no gross sensory or motor deficits. Wounds improving      - No acute vascular surgery intervention   - Please have patient f/u with Dr. Palmer at discharge   - Remainder care per primary   - Please call back with questions or concerns       Vascular Surgery  [phone]

## 2023-03-11 NOTE — PROGRESS NOTE ADULT - SUBJECTIVE AND OBJECTIVE BOX
VASCULAR SURGERY DAILY PROGRESS NOTE:     SUBJECTIVE/ROS:   Patient seen and evaluated on rounds.        OBJECTIVE:  Vital Signs Last 24 Hrs  T(C): 37.1 (11 Mar 2023 07:30), Max: 37.1 (10 Mar 2023 09:32)  T(F): 98.7 (11 Mar 2023 07:30), Max: 98.7 (10 Mar 2023 09:32)  HR: 73 (11 Mar 2023 07:30) (72 - 88)  BP: 135/74 (11 Mar 2023 07:30) (105/47 - 158/87)  BP(mean): --  RR: 18 (11 Mar 2023 07:30) (17 - 18)  SpO2: 100% (11 Mar 2023 07:30) (99% - 100%)    Parameters below as of 11 Mar 2023 07:30  Patient On (Oxygen Delivery Method): room air      I&O's Detail    10 Mar 2023 07:01  -  11 Mar 2023 07:00  --------------------------------------------------------  IN:    Other (mL): 400 mL  Total IN: 400 mL    OUT:    Other (mL): 2100 mL  Total OUT: 2100 mL    Total NET: -1700 mL      11 Mar 2023 06:01  -  11 Mar 2023 09:20  --------------------------------------------------------  IN:    PRBCs (Packed Red Blood Cells): 300 mL  Total IN: 300 mL    OUT:  Total OUT: 0 mL    Total NET: 300 mL        Daily     Daily Weight in k (10 Mar 2023 19:17)  MEDICATIONS  (STANDING):  acetaminophen     Tablet .. 975 milliGRAM(s) Oral every 8 hours  ampicillin/sulbactam  IVPB      ampicillin/sulbactam  IVPB 3 Gram(s) IV Intermittent every 24 hours  aspirin  chewable 81 milliGRAM(s) Oral daily  dextrose 5%. 1000 milliLiter(s) (50 mL/Hr) IV Continuous <Continuous>  dextrose 5%. 1000 milliLiter(s) (100 mL/Hr) IV Continuous <Continuous>  dextrose 50% Injectable 25 Gram(s) IV Push once  dextrose 50% Injectable 12.5 Gram(s) IV Push once  dextrose 50% Injectable 25 Gram(s) IV Push once  epoetin deidre-epbx (RETACRIT) Injectable 13418 Unit(s) IV Push once  glucagon  Injectable 1 milliGRAM(s) IntraMuscular once  heparin   Injectable 5000 Unit(s) SubCutaneous every 12 hours  insulin glargine Injectable (LANTUS) 7 Unit(s) SubCutaneous at bedtime  insulin lispro (ADMELOG) corrective regimen sliding scale   SubCutaneous at bedtime  insulin lispro (ADMELOG) corrective regimen sliding scale   SubCutaneous three times a day before meals  insulin lispro Injectable (ADMELOG) 2 Unit(s) SubCutaneous three times a day before meals  senna 2 Tablet(s) Oral at bedtime    MEDICATIONS  (PRN):  dextrose Oral Gel 15 Gram(s) Oral once PRN Blood Glucose LESS THAN 70 milliGRAM(s)/deciliter  oxyCODONE    IR 10 milliGRAM(s) Oral every 4 hours PRN Severe Pain (7 - 10)  oxyCODONE    IR 5 milliGRAM(s) Oral every 4 hours PRN Moderate Pain (4 - 6)      Labs:                        7.6    8.68  )-----------( 384      ( 10 Mar 2023 16:21 )             25.4     03-10    137  |  97<L>  |  55<H>  ----------------------------<  98  5.0   |  20<L>  |  9.05<H>    Ca    8.1<L>      10 Mar 2023 16:21    TPro  6.6  /  Alb  3.4  /  TBili  <0.2  /  DBili  <0.2  /  AST  21  /  ALT  5   /  AlkPhos  104  03-10            Physical Exam:  General: NAD  Cardiac: Regular rate  Respiratory: Nonlabored breathing  Vascular: Palpable thrill over RUE AVF  Muskuloskeletal: Moves all 4 extremities spontaneously  Neurological: Alert and oriented, no gross focal deficits, no motor or sensory deficits.

## 2023-03-11 NOTE — PROGRESS NOTE ADULT - SUBJECTIVE AND OBJECTIVE BOX
Pt seen/examined. Doing well. Pain controlled. No acute overnight complaints or events.    T(C): 36.9 (03-10-23 @ 22:00), Max: 37.1 (03-10-23 @ 09:32)  HR: 81 (03-10-23 @ 22:00) (72 - 88)  BP: 105/47 (03-10-23 @ 22:00) (105/47 - 158/87)  RR: 18 (03-10-23 @ 22:00) (16 - 18)  SpO2: 100% (03-10-23 @ 22:00) (99% - 100%)  Wt(kg): --  - Gen: NAD    Gen: NAD, alert and oriented  Resp: Unlabored breathing  Gen: awake, alert, NAD  Resp: no increased work of breathing  RUE  dressing c/d/i, W-> D dressing change done   + AIN/PIN/IO  C5-T1 SILT  compartments soft  radial pulse 2+     A/P:  Stable s/p Right hand and forearm irrigation and debridement    - Pain control/ Analgesia  - DVT ppx A81  -PT/OT   -NWB RUE  - FU vasc recs

## 2023-03-12 LAB
ANION GAP SERPL CALC-SCNC: 18 MMOL/L — HIGH (ref 7–14)
BUN SERPL-MCNC: 45 MG/DL — HIGH (ref 7–23)
CALCIUM SERPL-MCNC: 8.5 MG/DL — SIGNIFICANT CHANGE UP (ref 8.4–10.5)
CHLORIDE SERPL-SCNC: 96 MMOL/L — LOW (ref 98–107)
CO2 SERPL-SCNC: 21 MMOL/L — LOW (ref 22–31)
CREAT SERPL-MCNC: 8.32 MG/DL — HIGH (ref 0.5–1.3)
CULTURE RESULTS: SIGNIFICANT CHANGE UP
CULTURE RESULTS: SIGNIFICANT CHANGE UP
EGFR: 7 ML/MIN/1.73M2 — LOW
GLUCOSE BLDC GLUCOMTR-MCNC: 146 MG/DL — HIGH (ref 70–99)
GLUCOSE BLDC GLUCOMTR-MCNC: 164 MG/DL — HIGH (ref 70–99)
GLUCOSE BLDC GLUCOMTR-MCNC: 175 MG/DL — HIGH (ref 70–99)
GLUCOSE BLDC GLUCOMTR-MCNC: 221 MG/DL — HIGH (ref 70–99)
GLUCOSE SERPL-MCNC: 163 MG/DL — HIGH (ref 70–99)
HCT VFR BLD CALC: 30.4 % — LOW (ref 39–50)
HGB BLD-MCNC: 9.2 G/DL — LOW (ref 13–17)
MCHC RBC-ENTMCNC: 26.6 PG — LOW (ref 27–34)
MCHC RBC-ENTMCNC: 30.3 GM/DL — LOW (ref 32–36)
MCV RBC AUTO: 87.9 FL — SIGNIFICANT CHANGE UP (ref 80–100)
NRBC # BLD: 0 /100 WBCS — SIGNIFICANT CHANGE UP (ref 0–0)
NRBC # FLD: 0 K/UL — SIGNIFICANT CHANGE UP (ref 0–0)
PLATELET # BLD AUTO: 368 K/UL — SIGNIFICANT CHANGE UP (ref 150–400)
POTASSIUM SERPL-MCNC: 4.7 MMOL/L — SIGNIFICANT CHANGE UP (ref 3.5–5.3)
POTASSIUM SERPL-SCNC: 4.7 MMOL/L — SIGNIFICANT CHANGE UP (ref 3.5–5.3)
RBC # BLD: 3.46 M/UL — LOW (ref 4.2–5.8)
RBC # FLD: 16 % — HIGH (ref 10.3–14.5)
SODIUM SERPL-SCNC: 135 MMOL/L — SIGNIFICANT CHANGE UP (ref 135–145)
SPECIMEN SOURCE: SIGNIFICANT CHANGE UP
SPECIMEN SOURCE: SIGNIFICANT CHANGE UP
WBC # BLD: 10.42 K/UL — SIGNIFICANT CHANGE UP (ref 3.8–10.5)
WBC # FLD AUTO: 10.42 K/UL — SIGNIFICANT CHANGE UP (ref 3.8–10.5)

## 2023-03-12 PROCEDURE — 99233 SBSQ HOSP IP/OBS HIGH 50: CPT

## 2023-03-12 RX ORDER — DORZOLAMIDE HYDROCHLORIDE 20 MG/ML
1 SOLUTION/ DROPS OPHTHALMIC
Refills: 0 | Status: DISCONTINUED | OUTPATIENT
Start: 2023-03-12 | End: 2023-03-12

## 2023-03-12 RX ORDER — DORZOLAMIDE HYDROCHLORIDE TIMOLOL MALEATE 20; 5 MG/ML; MG/ML
1 SOLUTION/ DROPS OPHTHALMIC
Refills: 0 | Status: DISCONTINUED | OUTPATIENT
Start: 2023-03-12 | End: 2023-04-14

## 2023-03-12 RX ADMIN — SENNA PLUS 2 TABLET(S): 8.6 TABLET ORAL at 22:05

## 2023-03-12 RX ADMIN — Medication 975 MILLIGRAM(S): at 13:56

## 2023-03-12 RX ADMIN — Medication 2 UNIT(S): at 16:50

## 2023-03-12 RX ADMIN — Medication 4: at 16:50

## 2023-03-12 RX ADMIN — Medication 2 UNIT(S): at 11:59

## 2023-03-12 RX ADMIN — Medication 975 MILLIGRAM(S): at 07:06

## 2023-03-12 RX ADMIN — HEPARIN SODIUM 5000 UNIT(S): 5000 INJECTION INTRAVENOUS; SUBCUTANEOUS at 06:41

## 2023-03-12 RX ADMIN — AMPICILLIN SODIUM AND SULBACTAM SODIUM 200 GRAM(S): 250; 125 INJECTION, POWDER, FOR SUSPENSION INTRAMUSCULAR; INTRAVENOUS at 23:05

## 2023-03-12 RX ADMIN — INSULIN GLARGINE 7 UNIT(S): 100 INJECTION, SOLUTION SUBCUTANEOUS at 22:58

## 2023-03-12 RX ADMIN — Medication 81 MILLIGRAM(S): at 12:00

## 2023-03-12 RX ADMIN — Medication 2 UNIT(S): at 07:34

## 2023-03-12 RX ADMIN — DORZOLAMIDE HYDROCHLORIDE TIMOLOL MALEATE 1 DROP(S): 20; 5 SOLUTION/ DROPS OPHTHALMIC at 17:34

## 2023-03-12 RX ADMIN — Medication 975 MILLIGRAM(S): at 22:21

## 2023-03-12 RX ADMIN — Medication 2: at 07:34

## 2023-03-12 RX ADMIN — HEPARIN SODIUM 5000 UNIT(S): 5000 INJECTION INTRAVENOUS; SUBCUTANEOUS at 17:35

## 2023-03-12 RX ADMIN — Medication 975 MILLIGRAM(S): at 06:06

## 2023-03-12 RX ADMIN — Medication 975 MILLIGRAM(S): at 22:05

## 2023-03-12 NOTE — PROGRESS NOTE ADULT - PROBLEM SELECTOR PLAN 4
-f/u renal recs and plans for HD   -renally dose medications   -anemia of renal disease, c/w epo during HD as per renal  -renal restricted diet w/ nepro supplements  -plan for HD MWF, last session 3/10

## 2023-03-12 NOTE — PROGRESS NOTE ADULT - ASSESSMENT
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 Vascular Surgery consulted to r/o steal syndrome, no intervention indicated per their rec's. Still awaiting MRI [ ].

## 2023-03-12 NOTE — PROGRESS NOTE ADULT - SUBJECTIVE AND OBJECTIVE BOX
ORTHO PROGRESS NOTE     Pt seen and examined at bedside, denies SOB, CP, Dizziness. N/V/D /HA.  No significant overnight events. Pain well controlled.    Vital Signs Last 24 Hrs  T(C): 36.8 (12 Mar 2023 05:44), Max: 37.1 (11 Mar 2023 09:53)  T(F): 98.3 (12 Mar 2023 05:44), Max: 98.7 (11 Mar 2023 09:53)  HR: 69 (12 Mar 2023 05:44) (69 - 80)  BP: 150/71 (12 Mar 2023 05:44) (102/52 - 152/54)  BP(mean): --  RR: 18 (12 Mar 2023 05:44) (18 - 18)  SpO2: 100% (12 Mar 2023 05:44) (100% - 100%)    Parameters below as of 12 Mar 2023 05:44  Patient On (Oxygen Delivery Method): room air        Gen: NAD, alert and oriented  Resp: Unlabored breathing  Gen: awake, alert, NAD  Resp: no increased work of breathing  RUE  dressing c/d/i, W-> D dressing change done   + AIN/PIN/IO  C5-T1 SILT  compartments soft  radial pulse 2+       Labs:  CBC Full  -  ( 11 Mar 2023 11:20 )  WBC Count : 11.50 K/uL  RBC Count : 3.64 M/uL  Hemoglobin : 9.6 g/dL  Hematocrit : 31.8 %  Platelet Count - Automated : 403 K/uL  Mean Cell Volume : 87.4 fL  Mean Cell Hemoglobin : 26.4 pg  Mean Cell Hemoglobin Concentration : 30.2 gm/dL  Auto Neutrophil # : 7.20 K/uL  Auto Lymphocyte # : 2.62 K/uL  Auto Monocyte # : 1.12 K/uL  Auto Eosinophil # : 0.42 K/uL  Auto Basophil # : 0.09 K/uL  Auto Neutrophil % : 62.6 %  Auto Lymphocyte % : 22.8 %  Auto Monocyte % : 9.7 %  Auto Eosinophil % : 3.7 %  Auto Basophil % : 0.8 %      x    A/P:  Stable s/p Right hand and forearm irrigation and debridement    - Pain control/ Analgesia  - DVT ppx A81  -PT/OT   -NWB RUE  - FU vasc recs

## 2023-03-12 NOTE — PROGRESS NOTE ADULT - SUBJECTIVE AND OBJECTIVE BOX
Patient is a 47y Male whom presented to the hospital with esrd on hd    PAST MEDICAL & SURGICAL HISTORY:  ESRD on dialysis      H/O hypotension      Diabetes mellitus      S/P BKA (below knee amputation) bilateral      AV fistula          MEDICATIONS  (STANDING):  acetaminophen     Tablet .. 975 milliGRAM(s) Oral every 8 hours  ampicillin/sulbactam  IVPB      aspirin  chewable 81 milliGRAM(s) Oral daily  dextrose 5%. 1000 milliLiter(s) (50 mL/Hr) IV Continuous <Continuous>  dextrose 5%. 1000 milliLiter(s) (100 mL/Hr) IV Continuous <Continuous>  dextrose 50% Injectable 25 Gram(s) IV Push once  dextrose 50% Injectable 12.5 Gram(s) IV Push once  dextrose 50% Injectable 25 Gram(s) IV Push once  glucagon  Injectable 1 milliGRAM(s) IntraMuscular once  insulin lispro (ADMELOG) corrective regimen sliding scale   SubCutaneous three times a day before meals  lactated ringers. 1000 milliLiter(s) (100 mL/Hr) IV Continuous <Continuous>      Allergies    No Known Drug Allergies  [place] (Rash)    Intolerances        SOCIAL HISTORY:  Denies ETOh,Smoking,     FAMILY HISTORY:  No pertinent family history in first degree relatives        REVIEW OF SYSTEMS:    CONSTITUTIONAL: No weakness, fevers or chills  NECK: No pain or stiffness  RESPIRATORY: No cough, wheezing, hemoptysis; No shortness of breath  CARDIOVASCULAR: No chest pain or palpitations  GASTROINTESTINAL: No abdominal or epigastric pain. No nausea, vomiting,                                                                                    9.2    10.42 )-----------( 368      ( 12 Mar 2023 07:26 )             30.4       CBC Full  -  ( 12 Mar 2023 07:26 )  WBC Count : 10.42 K/uL  RBC Count : 3.46 M/uL  Hemoglobin : 9.2 g/dL  Hematocrit : 30.4 %  Platelet Count - Automated : 368 K/uL  Mean Cell Volume : 87.9 fL  Mean Cell Hemoglobin : 26.6 pg  Mean Cell Hemoglobin Concentration : 30.3 gm/dL  Auto Neutrophil # : x  Auto Lymphocyte # : x  Auto Monocyte # : x  Auto Eosinophil # : x  Auto Basophil # : x  Auto Neutrophil % : x  Auto Lymphocyte % : x  Auto Monocyte % : x  Auto Eosinophil % : x  Auto Basophil % : x      03-12    135  |  96<L>  |  45<H>  ----------------------------<  163<H>  4.7   |  21<L>  |  8.32<H>    Ca    8.5      12 Mar 2023 07:26  Phos  5.7     03-11  Mg     2.20     03-11    TPro  6.6  /  Alb  3.4  /  TBili  <0.2  /  DBili  <0.2  /  AST  21  /  ALT  5   /  AlkPhos  104  03-10      CAPILLARY BLOOD GLUCOSE      POCT Blood Glucose.: 164 mg/dL (12 Mar 2023 07:07)  POCT Blood Glucose.: 138 mg/dL (11 Mar 2023 22:07)  POCT Blood Glucose.: 177 mg/dL (11 Mar 2023 16:43)  POCT Blood Glucose.: 112 mg/dL (11 Mar 2023 11:20)      Vital Signs Last 24 Hrs  T(C): 36.4 (12 Mar 2023 10:00), Max: 36.8 (11 Mar 2023 18:12)  T(F): 97.5 (12 Mar 2023 10:00), Max: 98.3 (11 Mar 2023 18:12)  HR: 67 (12 Mar 2023 10:00) (67 - 80)  BP: 147/74 (12 Mar 2023 10:00) (139/69 - 152/54)  BP(mean): --  RR: 18 (12 Mar 2023 10:00) (18 - 18)  SpO2: 100% (12 Mar 2023 10:00) (100% - 100%)    Parameters below as of 12 Mar 2023 10:00  Patient On (Oxygen Delivery Method): room air                            PHYSICAL EXAM:    Constitutional: NAD  HEENT: conjunctive   clear   Neck:  No JVD  Respiratory: CTAB  Cardiovascular: S1 and S2  Gastrointestinal: BS+, soft, NT/ND  Extremities: No peripheral edema ,  Prior right 3rd digit amputation.S/P BKA (below knee amputation) bilateral  Neurological:  no focal deficits  Psychiatric: Normal mood, normal affect  : No Cancino  Skin:   Access: pos fistula

## 2023-03-12 NOTE — PROGRESS NOTE ADULT - PROBLEM SELECTOR PLAN 5
-diabetic retinopathy.  severe proliferative diabetic retinopathy, previously on cosopt, optho outpt follow up   -OzJ6x=7.5%. FS better controlled in-house.  -c/w ISS for now and continue to monitor FS closely.  -patient on 4 units premeals and 5 units at bedtime  -c/w lantus to 7units subq at bedtime and admelog 2units qAC before meals  -will titrate up as necessary  -c/w ASA daily

## 2023-03-12 NOTE — PROGRESS NOTE ADULT - SUBJECTIVE AND OBJECTIVE BOX
[facility] Division of Hospital Medicine  Nae Grimaldo MD  Pager (M-F, 8A-5P): [phone]  Other Times:  [phone]      SUBJECTIVE / OVERNIGHT EVENTS: NAEON, pt sitting in the chair, afebrile overnight.    MEDICATIONS  (STANDING):  acetaminophen     Tablet .. 975 milliGRAM(s) Oral every 8 hours  ampicillin/sulbactam  IVPB      ampicillin/sulbactam  IVPB 3 Gram(s) IV Intermittent every 24 hours  aspirin  chewable 81 milliGRAM(s) Oral daily  dextrose 5%. 1000 milliLiter(s) (100 mL/Hr) IV Continuous <Continuous>  dextrose 5%. 1000 milliLiter(s) (50 mL/Hr) IV Continuous <Continuous>  dextrose 50% Injectable 25 Gram(s) IV Push once  dextrose 50% Injectable 12.5 Gram(s) IV Push once  dextrose 50% Injectable 25 Gram(s) IV Push once  dorzolamide 2% Ophthalmic Solution 1 Drop(s) Both EYES <User Schedule>  epoetin deidre-epbx (RETACRIT) Injectable 91827 Unit(s) IV Push once  glucagon  Injectable 1 milliGRAM(s) IntraMuscular once  heparin   Injectable 5000 Unit(s) SubCutaneous every 12 hours  insulin glargine Injectable (LANTUS) 7 Unit(s) SubCutaneous at bedtime  insulin lispro (ADMELOG) corrective regimen sliding scale   SubCutaneous at bedtime  insulin lispro (ADMELOG) corrective regimen sliding scale   SubCutaneous three times a day before meals  insulin lispro Injectable (ADMELOG) 2 Unit(s) SubCutaneous three times a day before meals  senna 2 Tablet(s) Oral at bedtime    MEDICATIONS  (PRN):  dextrose Oral Gel 15 Gram(s) Oral once PRN Blood Glucose LESS THAN 70 milliGRAM(s)/deciliter  oxyCODONE    IR 10 milliGRAM(s) Oral every 4 hours PRN Severe Pain (7 - 10)  oxyCODONE    IR 5 milliGRAM(s) Oral every 4 hours PRN Moderate Pain (4 - 6)      I&O's Summary    11 Mar 2023 06:01  -  12 Mar 2023 07:00  --------------------------------------------------------  IN: 300 mL / OUT: 0 mL / NET: 300 mL        PHYSICAL EXAM:  Vital Signs Last 24 Hrs  T(C): 36.4 (12 Mar 2023 10:00), Max: 36.8 (11 Mar 2023 18:12)  T(F): 97.5 (12 Mar 2023 10:00), Max: 98.3 (11 Mar 2023 18:12)  HR: 67 (12 Mar 2023 10:00) (67 - 80)  BP: 147/74 (12 Mar 2023 10:00) (139/69 - 152/54)  BP(mean): --  RR: 18 (12 Mar 2023 10:00) (18 - 18)  SpO2: 100% (12 Mar 2023 10:00) (100% - 100%)    Parameters below as of 12 Mar 2023 10:00  Patient On (Oxygen Delivery Method): room air      PHYSICAL EXAM:  GENERAL: NAD, well-developed  CHEST/LUNG: Clear to auscultation bilaterally; No wheeze  HEART: Regular rate and rhythm; No murmurs, rubs, or gallops  ABDOMEN: Soft, Nontender, Nondistended; Bowel sounds present  EXTREMITIES:  B/L LE amputations +BKA  SKIN: Right hand dressing C/D/I    LABS:                        9.2    10.42 )-----------( 368      ( 12 Mar 2023 07:26 )             30.4     03-12    135  |  96<L>  |  45<H>  ----------------------------<  163<H>  4.7   |  21<L>  |  8.32<H>    Ca    8.5      12 Mar 2023 07:26  Phos  5.7     03-11  Mg     2.20     03-11    TPro  6.6  /  Alb  3.4  /  TBili  <0.2  /  DBili  <0.2  /  AST  21  /  ALT  5   /  AlkPhos  104  03-10                RADIOLOGY & ADDITIONAL TESTS:  Results Reviewed:   Imaging Personally Reviewed:  Electrocardiogram Personally Reviewed:    COORDINATION OF CARE:  Care Discussed with Consultants/Other Providers [Y/N]:  Prior or Outpatient Records Reviewed [Y/N]:

## 2023-03-12 NOTE — PROGRESS NOTE ADULT - ASSESSMENT
This is a 46yo [place] speaking male with PMH of DM2, b/l BKA, ESRD (on HD MWF) well known to Orthopedics for treatment of right third finger 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 Sin/Dr. Singh) who was seen by Dr. Sin today in the office and sent in to [facility] for urgent irrigation and debridement. Patient states he missed dialysis today because he was instructed to come straight to the ED.     PAST MEDICAL & SURGICAL HISTORY:  ESRD on dialysis  H/O hypotension  Diabetes mellitus  S/P BKA (below knee amputation) bilateral  AV fistula (06 Mar 2023 17:32)            esrd on hd   will plan for hd as order    Excess fluids and waste products will be removed from your blood; your electrolytes will be balanced; your blood pressure will be controlled.      ANEMIA PLAN:  Anemia of chronic disease:  Well controlled by Aranesp  H and H subtherapeutic .  We will continue Aranesp aiming for a HCT of 32-36 %.   We will monitor Iron stores, B12 and RBC folate .      ,send blood and urine cx,serial lactate levels,monitor vitals closley,ivfs hydration,monitor urine output and renal profile,iv abx

## 2023-03-12 NOTE — PHYSICAL EXAM
Called to bedside by RN for hypotension with MAP 60. Patient asymptomatic, though some lethargy noted not present earlier in the day. Patient noted to be utilizing PCA pump pushes more frequently today s/o severe pain. Patient still in significant abdominal pain s/o large abdominal mass. Stat lactate obtained, unchanged from patient's recent baseline. Tele showing sinus tach rate 120-130s. Multiple repeat BP with MAP 50s-60s. Patient has received >5L IVF over the past 3 days, and he is noted to be significantly volume overloaded with 3+ pitting edema and weeping in both upper and lower extremities. Rapid team called to assess and case discussed with MICU fellow on call. MICU team evaluated patient, and patient will be transferred back to MICU for higher level of care.     Mina Nolasco MD  [facility]  Bone Marrow Transplant Unit   [4 x 4] : 4 x 4  [Abdominal Pad] : Abdominal Pad [Normal Breath Sounds] : Normal breath sounds [0] : left 0 [1+] : left 1+ [Ankle Swelling On The Right] : mild [Varicose Veins Of Lower Extremities] : present [Purpura] : purpura [Skin Ulcer] : ulcer [Petechiae] : petechiae [Skin Induration] : induration [Alert] : alert [Oriented to Person] : oriented to person [Oriented to Place] : oriented to place [Calm] : calm [de-identified] : comfortable  [de-identified] : WNL [de-identified] : hammer toe [de-identified] : Diabetic neuropathy  [de-identified] : DFU 3 plantar left heel , clean granular  [FreeTextEntry1] : Posterior Heel  [de-identified] : post debridement measurements: 2.9 x 1.6 x 0.2-0.5 [FreeTextEntry2] : 2.7 [FreeTextEntry3] : 1.4 [FreeTextEntry4] : 0.1-0.5 [de-identified] : 0.2-0.4cm @ 8-2 o'clock [de-identified] : Apligraf application  [de-identified] : Apligraf, Dermabond,Woundveil, Calcium Alginate  [de-identified] : callus [de-identified] : Cleansed with Normal saline\par  [TWNoteComboBox1] : Left [TWNoteComboBox4] : Small [TWNoteComboBox6] : Diabetic [TWNoteComboBox5] : No [de-identified] : Mild [de-identified] : None [de-identified] : other [de-identified] : Yes [de-identified] : 100% [de-identified] : No [de-identified] : Other [TWNoteComboBox7] : Isabela [de-identified] : Ace wraps [de-identified] : False [de-identified] : Primary Dressing

## 2023-03-13 LAB
ANION GAP SERPL CALC-SCNC: 21 MMOL/L — HIGH (ref 7–14)
BUN SERPL-MCNC: 66 MG/DL — HIGH (ref 7–23)
CALCIUM SERPL-MCNC: 8.4 MG/DL — SIGNIFICANT CHANGE UP (ref 8.4–10.5)
CHLORIDE SERPL-SCNC: 97 MMOL/L — LOW (ref 98–107)
CO2 SERPL-SCNC: 19 MMOL/L — LOW (ref 22–31)
CREAT SERPL-MCNC: 10.18 MG/DL — HIGH (ref 0.5–1.3)
EGFR: 6 ML/MIN/1.73M2 — LOW
GLUCOSE BLDC GLUCOMTR-MCNC: 129 MG/DL — HIGH (ref 70–99)
GLUCOSE BLDC GLUCOMTR-MCNC: 138 MG/DL — HIGH (ref 70–99)
GLUCOSE BLDC GLUCOMTR-MCNC: 143 MG/DL — HIGH (ref 70–99)
GLUCOSE BLDC GLUCOMTR-MCNC: 205 MG/DL — HIGH (ref 70–99)
GLUCOSE BLDC GLUCOMTR-MCNC: 94 MG/DL — SIGNIFICANT CHANGE UP (ref 70–99)
GLUCOSE SERPL-MCNC: 142 MG/DL — HIGH (ref 70–99)
HCT VFR BLD CALC: 28.1 % — LOW (ref 39–50)
HGB BLD-MCNC: 8.6 G/DL — LOW (ref 13–17)
MCHC RBC-ENTMCNC: 26.3 PG — LOW (ref 27–34)
MCHC RBC-ENTMCNC: 30.6 GM/DL — LOW (ref 32–36)
MCV RBC AUTO: 85.9 FL — SIGNIFICANT CHANGE UP (ref 80–100)
NRBC # BLD: 0 /100 WBCS — SIGNIFICANT CHANGE UP (ref 0–0)
NRBC # FLD: 0 K/UL — SIGNIFICANT CHANGE UP (ref 0–0)
PLATELET # BLD AUTO: 399 K/UL — SIGNIFICANT CHANGE UP (ref 150–400)
POTASSIUM SERPL-MCNC: 6 MMOL/L — HIGH (ref 3.5–5.3)
POTASSIUM SERPL-SCNC: 6 MMOL/L — HIGH (ref 3.5–5.3)
RBC # BLD: 3.27 M/UL — LOW (ref 4.2–5.8)
RBC # FLD: 15.7 % — HIGH (ref 10.3–14.5)
SODIUM SERPL-SCNC: 137 MMOL/L — SIGNIFICANT CHANGE UP (ref 135–145)
WBC # BLD: 7.81 K/UL — SIGNIFICANT CHANGE UP (ref 3.8–10.5)
WBC # FLD AUTO: 7.81 K/UL — SIGNIFICANT CHANGE UP (ref 3.8–10.5)

## 2023-03-13 PROCEDURE — 99233 SBSQ HOSP IP/OBS HIGH 50: CPT | Mod: GC

## 2023-03-13 PROCEDURE — 99233 SBSQ HOSP IP/OBS HIGH 50: CPT

## 2023-03-13 PROCEDURE — 73220 MRI UPPR EXTREMITY W/O&W/DYE: CPT | Mod: 26,RT

## 2023-03-13 PROCEDURE — 93010 ELECTROCARDIOGRAM REPORT: CPT

## 2023-03-13 RX ORDER — SODIUM ZIRCONIUM CYCLOSILICATE 10 G/10G
10 POWDER, FOR SUSPENSION ORAL DAILY
Refills: 0 | Status: DISCONTINUED | OUTPATIENT
Start: 2023-03-13 | End: 2023-03-14

## 2023-03-13 RX ORDER — COLLAGENASE CLOSTRIDIUM HIST. 250 UNIT/G
1 OINTMENT (GRAM) TOPICAL
Refills: 0 | Status: DISCONTINUED | OUTPATIENT
Start: 2023-03-13 | End: 2023-04-14

## 2023-03-13 RX ADMIN — DORZOLAMIDE HYDROCHLORIDE TIMOLOL MALEATE 1 DROP(S): 20; 5 SOLUTION/ DROPS OPHTHALMIC at 07:22

## 2023-03-13 RX ADMIN — HEPARIN SODIUM 5000 UNIT(S): 5000 INJECTION INTRAVENOUS; SUBCUTANEOUS at 22:26

## 2023-03-13 RX ADMIN — HEPARIN SODIUM 5000 UNIT(S): 5000 INJECTION INTRAVENOUS; SUBCUTANEOUS at 07:21

## 2023-03-13 RX ADMIN — INSULIN GLARGINE 7 UNIT(S): 100 INJECTION, SOLUTION SUBCUTANEOUS at 22:18

## 2023-03-13 RX ADMIN — Medication 975 MILLIGRAM(S): at 07:00

## 2023-03-13 RX ADMIN — Medication 975 MILLIGRAM(S): at 14:45

## 2023-03-13 RX ADMIN — ERYTHROPOIETIN 10000 UNIT(S): 10000 INJECTION, SOLUTION INTRAVENOUS; SUBCUTANEOUS at 18:07

## 2023-03-13 RX ADMIN — Medication 975 MILLIGRAM(S): at 22:17

## 2023-03-13 RX ADMIN — Medication 81 MILLIGRAM(S): at 13:41

## 2023-03-13 RX ADMIN — Medication 975 MILLIGRAM(S): at 07:35

## 2023-03-13 RX ADMIN — DORZOLAMIDE HYDROCHLORIDE TIMOLOL MALEATE 1 DROP(S): 20; 5 SOLUTION/ DROPS OPHTHALMIC at 22:16

## 2023-03-13 RX ADMIN — Medication 975 MILLIGRAM(S): at 13:41

## 2023-03-13 NOTE — PROGRESS NOTE ADULT - SUBJECTIVE AND OBJECTIVE BOX
ORTHOPAEDICS DAILY PROGRESS NOTE:       SUBJECTIVE/ROS: POD 7. Seen and examined. Pain well controlled. Denies CP/SOB/N/V. Dressing changed         MEDICATIONS  (STANDING):  acetaminophen     Tablet .. 975 milliGRAM(s) Oral every 8 hours  ampicillin/sulbactam  IVPB      ampicillin/sulbactam  IVPB 3 Gram(s) IV Intermittent every 24 hours  aspirin  chewable 81 milliGRAM(s) Oral daily  dextrose 5%. 1000 milliLiter(s) (100 mL/Hr) IV Continuous <Continuous>  dextrose 5%. 1000 milliLiter(s) (50 mL/Hr) IV Continuous <Continuous>  dextrose 50% Injectable 25 Gram(s) IV Push once  dextrose 50% Injectable 12.5 Gram(s) IV Push once  dextrose 50% Injectable 25 Gram(s) IV Push once  dorzolamide 2%/timolol 0.5% Ophthalmic Solution 1 Drop(s) Both EYES two times a day  epoetin deidre-epbx (RETACRIT) Injectable 56732 Unit(s) IV Push once  glucagon  Injectable 1 milliGRAM(s) IntraMuscular once  heparin   Injectable 5000 Unit(s) SubCutaneous every 12 hours  insulin glargine Injectable (LANTUS) 7 Unit(s) SubCutaneous at bedtime  insulin lispro (ADMELOG) corrective regimen sliding scale   SubCutaneous at bedtime  insulin lispro (ADMELOG) corrective regimen sliding scale   SubCutaneous three times a day before meals  insulin lispro Injectable (ADMELOG) 2 Unit(s) SubCutaneous three times a day before meals  senna 2 Tablet(s) Oral at bedtime    MEDICATIONS  (PRN):  dextrose Oral Gel 15 Gram(s) Oral once PRN Blood Glucose LESS THAN 70 milliGRAM(s)/deciliter  oxyCODONE    IR 10 milliGRAM(s) Oral every 4 hours PRN Severe Pain (7 - 10)  oxyCODONE    IR 5 milliGRAM(s) Oral every 4 hours PRN Moderate Pain (4 - 6)      OBJECTIVE:    Vital Signs Last 24 Hrs  T(C): 36.4 (12 Mar 2023 22:31), Max: 36.8 (12 Mar 2023 18:40)  T(F): 97.5 (12 Mar 2023 22:31), Max: 98.3 (12 Mar 2023 18:40)  HR: 60 (12 Mar 2023 22:31) (60 - 71)  BP: 123/60 (12 Mar 2023 22:31) (123/60 - 147/78)  BP(mean): --  RR: 18 (12 Mar 2023 22:31) (18 - 18)  SpO2: 100% (12 Mar 2023 22:31) (100% - 100%)    Parameters below as of 12 Mar 2023 22:31  Patient On (Oxygen Delivery Method): room air        I&O's Detail    11 Mar 2023 06:01  -  12 Mar 2023 07:00  --------------------------------------------------------  IN:    PRBCs (Packed Red Blood Cells): 300 mL  Total IN: 300 mL    OUT:  Total OUT: 0 mL    Total NET: 300 mL          Daily     Daily     LABS:                        9.2    10.42 )-----------( 368      ( 12 Mar 2023 07:26 )             30.4     03-12    135  |  96<L>  |  45<H>  ----------------------------<  163<H>  4.7   |  21<L>  |  8.32<H>    Ca    8.5      12 Mar 2023 07:26  Phos  5.7     03-11  Mg     2.20     03-11                    PHYSICAL EXAM:  Gen: NAD, alert and oriented  Resp: Unlabored breathing  Gen: awake, alert, NAD  Resp: no increased work of breathing  RUE  dressing c/d/i, dressing change done   + AIN/PIN/IO  C5-T1 SILT  compartments soft  radial pulse 2+

## 2023-03-13 NOTE — PROGRESS NOTE ADULT - ASSESSMENT
A/P:  Stable s/p Right hand and forearm irrigation and debridement    - Pain control/ Analgesia  - DVT ppx A81, SQH  -PT/OT   -NWB RUE  -BID dressing changes  - FU vasc recs

## 2023-03-13 NOTE — PROGRESS NOTE ADULT - SUBJECTIVE AND OBJECTIVE BOX
CHIEF COMPLAINT: f/u right forearm gas gangrene    SUBJECTIVE / OVERNIGHT EVENTS: Patient seen and examined. No acute events overnight. Pain well controlled and patient without any complaints.    MEDICATIONS  (STANDING):  acetaminophen     Tablet .. 975 milliGRAM(s) Oral every 8 hours  ampicillin/sulbactam  IVPB      ampicillin/sulbactam  IVPB 3 Gram(s) IV Intermittent every 24 hours  aspirin  chewable 81 milliGRAM(s) Oral daily  collagenase Ointment 1 Application(s) Topical two times a day  dextrose 5%. 1000 milliLiter(s) (50 mL/Hr) IV Continuous <Continuous>  dextrose 5%. 1000 milliLiter(s) (100 mL/Hr) IV Continuous <Continuous>  dextrose 50% Injectable 25 Gram(s) IV Push once  dextrose 50% Injectable 12.5 Gram(s) IV Push once  dextrose 50% Injectable 25 Gram(s) IV Push once  dorzolamide 2%/timolol 0.5% Ophthalmic Solution 1 Drop(s) Both EYES two times a day  epoetin deidre-epbx (RETACRIT) Injectable 63733 Unit(s) IV Push once  glucagon  Injectable 1 milliGRAM(s) IntraMuscular once  heparin   Injectable 5000 Unit(s) SubCutaneous every 12 hours  insulin glargine Injectable (LANTUS) 7 Unit(s) SubCutaneous at bedtime  insulin lispro (ADMELOG) corrective regimen sliding scale   SubCutaneous at bedtime  insulin lispro (ADMELOG) corrective regimen sliding scale   SubCutaneous three times a day before meals  insulin lispro Injectable (ADMELOG) 2 Unit(s) SubCutaneous three times a day before meals  senna 2 Tablet(s) Oral at bedtime    MEDICATIONS  (PRN):  dextrose Oral Gel 15 Gram(s) Oral once PRN Blood Glucose LESS THAN 70 milliGRAM(s)/deciliter  oxyCODONE    IR 10 milliGRAM(s) Oral every 4 hours PRN Severe Pain (7 - 10)  oxyCODONE    IR 5 milliGRAM(s) Oral every 4 hours PRN Moderate Pain (4 - 6)    VITALS:  T(F): 98 (03-13-23 @ 07:42), Max: 98.3 (03-12-23 @ 18:40)  HR: 66 (03-13-23 @ 07:42) (60 - 71)  BP: 145/70 (03-13-23 @ 07:42) (123/60 - 147/78)  RR: 18 (03-13-23 @ 07:42) (18 - 18)  SpO2: 100% (03-13-23 @ 07:42)    PHYSICAL EXAM:  GENERAL: NAD, well-developed  CHEST/LUNG: Clear to auscultation bilaterally; No wheeze  HEART: Regular rate and rhythm; No murmurs, rubs, or gallops  ABDOMEN: Soft, Nontender, Nondistended; Bowel sounds present  EXTREMITIES:  B/L LE amputations +BKA  SKIN: Right hand dressing C/D/I    LABS:              9.2                  135  | 21   | 45           10.42 >-----------< 368     ------------------------< 163                   30.4                 4.7  | 96   | 8.32                                         Ca 8.5   Mg x     Ph x        CAPILLARY BLOOD GLUCOSE  POCT Blood Glucose.: 129 mg/dL (13 Mar 2023 11:18)  POCT Blood Glucose.: 94 mg/dL (13 Mar 2023 07:29)  POCT Blood Glucose.: 175 mg/dL (12 Mar 2023 22:28)  POCT Blood Glucose.: 221 mg/dL (12 Mar 2023 16:41)    [ ] Consultant(s) Notes Reviewed:  [x] Care Discussed with Consultants/Other Providers: Orthopedic PA - discussed plan for MRI forearm No

## 2023-03-13 NOTE — PROGRESS NOTE ADULT - SUBJECTIVE AND OBJECTIVE BOX
Follow Up:  hand gangrene, steal syndrome    Interval History/ROS: no fever, SOB, vomiting, no pain anymore, MRI today showed  abscesses        Allergies  No Known Drug Allergies  [place] (Rash)        ANTIMICROBIALS:  ampicillin/sulbactam  IVPB    ampicillin/sulbactam  IVPB 3 every 24 hours      OTHER MEDS:  acetaminophen     Tablet .. 975 milliGRAM(s) Oral every 8 hours  aspirin  chewable 81 milliGRAM(s) Oral daily  collagenase Ointment 1 Application(s) Topical two times a day  dextrose 5%. 1000 milliLiter(s) IV Continuous <Continuous>  dextrose 5%. 1000 milliLiter(s) IV Continuous <Continuous>  dextrose 50% Injectable 25 Gram(s) IV Push once  dextrose 50% Injectable 12.5 Gram(s) IV Push once  dextrose 50% Injectable 25 Gram(s) IV Push once  dextrose Oral Gel 15 Gram(s) Oral once PRN  dorzolamide 2%/timolol 0.5% Ophthalmic Solution 1 Drop(s) Both EYES two times a day  glucagon  Injectable 1 milliGRAM(s) IntraMuscular once  heparin   Injectable 5000 Unit(s) SubCutaneous every 12 hours  insulin glargine Injectable (LANTUS) 7 Unit(s) SubCutaneous at bedtime  insulin lispro (ADMELOG) corrective regimen sliding scale   SubCutaneous at bedtime  insulin lispro (ADMELOG) corrective regimen sliding scale   SubCutaneous three times a day before meals  insulin lispro Injectable (ADMELOG) 2 Unit(s) SubCutaneous three times a day before meals  oxyCODONE    IR 10 milliGRAM(s) Oral every 4 hours PRN  oxyCODONE    IR 5 milliGRAM(s) Oral every 4 hours PRN  senna 2 Tablet(s) Oral at bedtime      Vital Signs Last 24 Hrs  T(C): 36.7 (13 Mar 2023 15:55), Max: 36.7 (13 Mar 2023 07:42)  T(F): 98.1 (13 Mar 2023 15:55), Max: 98.1 (13 Mar 2023 15:55)  HR: 69 (13 Mar 2023 15:55) (60 - 69)  BP: 152/82 (13 Mar 2023 15:55) (123/60 - 152/82)  BP(mean): --  RR: 18 (13 Mar 2023 15:55) (18 - 18)  SpO2: 100% (13 Mar 2023 15:55) (100% - 100%)    Parameters below as of 13 Mar 2023 15:55  Patient On (Oxygen Delivery Method): room air        Physical Exam:  General:    NAD,  non toxic  Respiratory:    comfortable on RA  abd:     soft,      no tenderness  :   no CVAT,  no suprapubic tenderness,   no  lackey  Musculoskeletal:   b/l BKA, R hand s/p 3rd digit amp, index tip with wound and no nail, multiple scabs, forearm with open wound and packing  vascular: no phlebitis  Skin:    no rash                        8.6    7.81  )-----------( 399      ( 13 Mar 2023 15:50 )             28.1       03-13    137  |  97<L>  |  66<H>  ----------------------------<  142<H>  6.0<H>   |  19<L>  |  10.18<H>    Ca    8.4      13 Mar 2023 15:50            MICROBIOLOGY:  v  .Nose  03-07-23   No staphylococcus aureus isolated.  "PCR is more Sensitive for identifying MRSA/MSSA."  --  --      .Blood Blood  03-06-23   No Growth Final  --  --      .Blood Blood  03-06-23   No Growth Final  --  --                RADIOLOGY:  Images independently visualized and reviewed personally, findings as below  < from: MR Forearm w/wo IV Cont, Right (03.13.23 @ 13:44) >  No acute fracture, bone bruise, or osseous stress response. No   periostitis. Background marrow signal is normal without infiltrative   marrow process. No suspicious osseous lesions.    Soft tissue defect roughly spanning 2.8 cm TRV by 5 cm CC at the palmar   aspect of the distal forearm. There is a 2.6 cm rim-enhancing fluid   collection within the soft tissues at the radial aspect of the skin   defect with adjacent rim enhancement representing abscess formation.   There are additional subcentimeter phlegmonous foci with surrounding soft   tissue enhancement within the soft tissues to the radial aspect of the   soft tissue defect. There is skin thickening along the radial aspect of  the distal forearm. Surrounding subcutaneous and musculature edema.    IMPRESSION:  Soft tissue defect roughly spanning 2.8 cm TRV by 5 cm CC at the palmar   aspect of the distal forearm. There is a 2.6 cm rim-enhancing fluid   collection within the soft tissues at the radial aspect of the skin   defect with adjacent rim enhancement representing abscess formation.   There are additional subcentimeter phlegmonous foci with surrounding soft   tissue enhancement within the soft tissues to the radial aspect of the   soft tissue defect. Surrounding subcutaneous and musculature edema.    < end of copied text >

## 2023-03-13 NOTE — PROGRESS NOTE ADULT - PROBLEM SELECTOR PLAN 4
-f/u renal recs and plans for HD   -renally dose medications   -anemia of renal disease, c/w epo during HD as per renal  -renal restricted diet w/ nepro supplements  -plan for HD MWF, last session 3/10 and next session planned for today

## 2023-03-13 NOTE — PROGRESS NOTE ADULT - SUBJECTIVE AND OBJECTIVE BOX
Patient is a 47y Male whom presented to the hospital with esrd on hd    PAST MEDICAL & SURGICAL HISTORY:  ESRD on dialysis      H/O hypotension      Diabetes mellitus      S/P BKA (below knee amputation) bilateral      AV fistula          MEDICATIONS  (STANDING):  acetaminophen     Tablet .. 975 milliGRAM(s) Oral every 8 hours  ampicillin/sulbactam  IVPB      aspirin  chewable 81 milliGRAM(s) Oral daily  dextrose 5%. 1000 milliLiter(s) (50 mL/Hr) IV Continuous <Continuous>  dextrose 5%. 1000 milliLiter(s) (100 mL/Hr) IV Continuous <Continuous>  dextrose 50% Injectable 25 Gram(s) IV Push once  dextrose 50% Injectable 12.5 Gram(s) IV Push once  dextrose 50% Injectable 25 Gram(s) IV Push once  glucagon  Injectable 1 milliGRAM(s) IntraMuscular once  insulin lispro (ADMELOG) corrective regimen sliding scale   SubCutaneous three times a day before meals  lactated ringers. 1000 milliLiter(s) (100 mL/Hr) IV Continuous <Continuous>      Allergies    No Known Drug Allergies  [place] (Rash)    Intolerances        SOCIAL HISTORY:  Denies ETOh,Smoking,     FAMILY HISTORY:  No pertinent family history in first degree relatives        REVIEW OF SYSTEMS:    CONSTITUTIONAL: No weakness, fevers or chills                               9.2    10.42 )-----------( 368      ( 12 Mar 2023 07:26 )             30.4       CBC Full  -  ( 12 Mar 2023 07:26 )  WBC Count : 10.42 K/uL  RBC Count : 3.46 M/uL  Hemoglobin : 9.2 g/dL  Hematocrit : 30.4 %  Platelet Count - Automated : 368 K/uL  Mean Cell Volume : 87.9 fL  Mean Cell Hemoglobin : 26.6 pg  Mean Cell Hemoglobin Concentration : 30.3 gm/dL  Auto Neutrophil # : x  Auto Lymphocyte # : x  Auto Monocyte # : x  Auto Eosinophil # : x  Auto Basophil # : x  Auto Neutrophil % : x  Auto Lymphocyte % : x  Auto Monocyte % : x  Auto Eosinophil % : x  Auto Basophil % : x      03-12    135  |  96<L>  |  45<H>  ----------------------------<  163<H>  4.7   |  21<L>  |  8.32<H>    Ca    8.5      12 Mar 2023 07:26        CAPILLARY BLOOD GLUCOSE      POCT Blood Glucose.: 129 mg/dL (13 Mar 2023 11:18)  POCT Blood Glucose.: 94 mg/dL (13 Mar 2023 07:29)  POCT Blood Glucose.: 175 mg/dL (12 Mar 2023 22:28)  POCT Blood Glucose.: 221 mg/dL (12 Mar 2023 16:41)      Vital Signs Last 24 Hrs  T(C): 36.7 (13 Mar 2023 07:42), Max: 36.8 (12 Mar 2023 18:40)  T(F): 98 (13 Mar 2023 07:42), Max: 98.3 (12 Mar 2023 18:40)  HR: 66 (13 Mar 2023 07:42) (60 - 71)  BP: 145/70 (13 Mar 2023 07:42) (123/60 - 147/78)  BP(mean): --  RR: 18 (13 Mar 2023 07:42) (18 - 18)  SpO2: 100% (13 Mar 2023 07:42) (100% - 100%)    Parameters below as of 13 Mar 2023 07:42  Patient On (Oxygen Delivery Method): room air                  PHYSICAL EXAM:    Constitutional: NAD  HEENT: conjunctive   clear   Neck:  No JVD  Respiratory: CTAB  Cardiovascular: S1 and S2  Gastrointestinal: BS+, soft, NT/ND  Extremities: No peripheral edema ,  Prior right 3rd digit amputation.S/P BKA (below knee amputation) bilateral  Access: pos fistula

## 2023-03-13 NOTE — PROGRESS NOTE ADULT - PROBLEM SELECTOR PLAN 5
-diabetic retinopathy.  severe proliferative diabetic retinopathy, previously on cosopt, optho outpt follow up   -SlP9a=2.5%. FS better controlled in-house.  -c/w ISS for now and continue to monitor FS closely.  -patient on 4 units premeals and 5 units at bedtime  -c/w lantus to 7units subq at bedtime and admelog 2units qAC before meals  -will titrate up as necessary  -c/w ASA daily

## 2023-03-13 NOTE — PROGRESS NOTE ADULT - ASSESSMENT
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 and multiple OR debridement with eventual closure, cultures with Ecoli pansensitive,  Strep angionosis and Bacteriodes fragilis, complicated Unasyn 6 week course while inpatient, now sent back by ortho for debridement as the hand was gangrenous and infected as per ortho afebrile, WBC normal, ESR: 44, CRP 23  xray  with areas of bone loss  s/p debridement 3/7 and no cultures sent  also COVID positive and CXR clear    ESRD with recent steal syndrome and R 3rd finger and forearm gas gangrene s/p amp and multiple debridement, completed a 6 week course of unasyn but after completing sent back for infected hand  s/p OR debridement 3/7 but no cultures sent   blood cx negative, MRSA PCR negative  MRI 3/13 after 7 days of antibiotics and debridement showed a 2.6 cm abscess and additional subcentimeter phlegmonous foci  VA duplex s/o steal syndrome  Asymptomatic COVID CXR clear s/p 3 days of remdesivir    * c/w unasyn, debridement was 3/7 so day 7  * ortho is planning for OR in a few days  * f/u with vascular       The above assessment and plan was discussed with the primary team    Tori Chamorro MD  contact on teams  After 5pm and on weekends call [phone]       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 and multiple OR debridement with eventual closure, cultures with Ecoli pansensitive,  Strep angionosis and Bacteriodes fragilis, complicated Unasyn 6 week course while inpatient, now sent back by ortho for debridement as the hand was gangrenous and infected as per ortho afebrile, WBC normal, ESR: 44, CRP 23  xray  with areas of bone loss  s/p debridement 3/7 and no cultures sent  also COVID positive and CXR clear    ESRD with recent steal syndrome and R 3rd finger and forearm gas gangrene s/p amp and multiple debridement, completed a 6 week course of unasyn but after completing sent back for infected hand  s/p OR debridement 3/7 but no cultures sent   blood cx negative, MRSA PCR negative  MRI 3/13 after 7 days of antibiotics and debridement showed a 2.6 cm abscess and additional subcentimeter phlegmonous foci but no periostitis or osteo  VA duplex s/o steal syndrome  Asymptomatic COVID CXR clear s/p 3 days of remdesivir    * c/w unasyn, debridement was 3/7 so day 7  * ortho is planning for OR in a few days  * no osteo on MRI so will treat for a soft tissue course after OR  * f/u with vascular       The above assessment and plan was discussed with the primary team    Tori Chamorro MD  contact on teams  After 5pm and on weekends call [phone]

## 2023-03-13 NOTE — PROGRESS NOTE ADULT - ASSESSMENT
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 Vascular Surgery consulted to r/o steal syndrome, no intervention indicated per their rec's. Still awaiting MRI [ ].

## 2023-03-13 NOTE — PROGRESS NOTE ADULT - ASSESSMENT
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 Sin/Dr. Singh) who was seen by Dr. Sin today in the office and sent in to [facility] for urgent irrigation and debridement. Patient states he missed dialysis today because he was instructed to come straight to the ED.     PAST MEDICAL & SURGICAL HISTORY:  ESRD on dialysis  H/O hypotension  Diabetes mellitus  S/P BKA (below knee amputation) bilateral  AV fistula (06 Mar 2023 17:32)            esrd on hd   will plan for hd as order    Excess fluids and waste products will be removed from your blood; your electrolytes will be balanced; your blood pressure will be controlled.      ANEMIA PLAN:  Anemia of chronic disease:  Well controlled by Aranesp  H and H subtherapeutic .  We will continue Aranesp aiming for a HCT of 32-36 %.   We will monitor Iron stores, B12 and RBC folate .      ,send blood and urine cx,serial lactate levels,monitor vitals closley,ivfs hydration,monitor urine output and renal profile,iv abx

## 2023-03-14 ENCOUNTER — TRANSCRIPTION ENCOUNTER (OUTPATIENT)
Age: 48
End: 2023-03-14

## 2023-03-14 LAB
ANION GAP SERPL CALC-SCNC: 18 MMOL/L — HIGH (ref 7–14)
BUN SERPL-MCNC: 39 MG/DL — HIGH (ref 7–23)
CALCIUM SERPL-MCNC: 8.8 MG/DL — SIGNIFICANT CHANGE UP (ref 8.4–10.5)
CHLORIDE SERPL-SCNC: 95 MMOL/L — LOW (ref 98–107)
CO2 SERPL-SCNC: 25 MMOL/L — SIGNIFICANT CHANGE UP (ref 22–31)
CREAT SERPL-MCNC: 6.71 MG/DL — HIGH (ref 0.5–1.3)
EGFR: 9 ML/MIN/1.73M2 — LOW
GLUCOSE BLDC GLUCOMTR-MCNC: 103 MG/DL — HIGH (ref 70–99)
GLUCOSE BLDC GLUCOMTR-MCNC: 105 MG/DL — HIGH (ref 70–99)
GLUCOSE BLDC GLUCOMTR-MCNC: 140 MG/DL — HIGH (ref 70–99)
GLUCOSE BLDC GLUCOMTR-MCNC: 151 MG/DL — HIGH (ref 70–99)
GLUCOSE SERPL-MCNC: 89 MG/DL — SIGNIFICANT CHANGE UP (ref 70–99)
HCT VFR BLD CALC: 34.5 % — LOW (ref 39–50)
HGB BLD-MCNC: 10.5 G/DL — LOW (ref 13–17)
MCHC RBC-ENTMCNC: 26.3 PG — LOW (ref 27–34)
MCHC RBC-ENTMCNC: 30.4 GM/DL — LOW (ref 32–36)
MCV RBC AUTO: 86.3 FL — SIGNIFICANT CHANGE UP (ref 80–100)
NRBC # BLD: 0 /100 WBCS — SIGNIFICANT CHANGE UP (ref 0–0)
NRBC # FLD: 0 K/UL — SIGNIFICANT CHANGE UP (ref 0–0)
PLATELET # BLD AUTO: 445 K/UL — HIGH (ref 150–400)
POTASSIUM SERPL-MCNC: 4.7 MMOL/L — SIGNIFICANT CHANGE UP (ref 3.5–5.3)
POTASSIUM SERPL-SCNC: 4.7 MMOL/L — SIGNIFICANT CHANGE UP (ref 3.5–5.3)
RBC # BLD: 4 M/UL — LOW (ref 4.2–5.8)
RBC # FLD: 15.5 % — HIGH (ref 10.3–14.5)
SARS-COV-2 RNA SPEC QL NAA+PROBE: SIGNIFICANT CHANGE UP
SODIUM SERPL-SCNC: 138 MMOL/L — SIGNIFICANT CHANGE UP (ref 135–145)
WBC # BLD: 7.12 K/UL — SIGNIFICANT CHANGE UP (ref 3.8–10.5)
WBC # FLD AUTO: 7.12 K/UL — SIGNIFICANT CHANGE UP (ref 3.8–10.5)

## 2023-03-14 PROCEDURE — 99233 SBSQ HOSP IP/OBS HIGH 50: CPT

## 2023-03-14 PROCEDURE — 73218 MRI UPPER EXTREMITY W/O DYE: CPT | Mod: 26,RT

## 2023-03-14 RX ADMIN — DORZOLAMIDE HYDROCHLORIDE TIMOLOL MALEATE 1 DROP(S): 20; 5 SOLUTION/ DROPS OPHTHALMIC at 21:58

## 2023-03-14 RX ADMIN — Medication 2: at 07:51

## 2023-03-14 RX ADMIN — Medication 2 UNIT(S): at 07:52

## 2023-03-14 RX ADMIN — Medication 975 MILLIGRAM(S): at 17:05

## 2023-03-14 RX ADMIN — INSULIN GLARGINE 7 UNIT(S): 100 INJECTION, SOLUTION SUBCUTANEOUS at 21:57

## 2023-03-14 RX ADMIN — Medication 1 APPLICATION(S): at 22:13

## 2023-03-14 RX ADMIN — Medication 975 MILLIGRAM(S): at 18:05

## 2023-03-14 RX ADMIN — Medication 1 APPLICATION(S): at 06:50

## 2023-03-14 RX ADMIN — DORZOLAMIDE HYDROCHLORIDE TIMOLOL MALEATE 1 DROP(S): 20; 5 SOLUTION/ DROPS OPHTHALMIC at 06:53

## 2023-03-14 RX ADMIN — Medication 2 UNIT(S): at 17:04

## 2023-03-14 RX ADMIN — Medication 81 MILLIGRAM(S): at 17:05

## 2023-03-14 RX ADMIN — Medication 2 UNIT(S): at 11:18

## 2023-03-14 RX ADMIN — AMPICILLIN SODIUM AND SULBACTAM SODIUM 200 GRAM(S): 250; 125 INJECTION, POWDER, FOR SUSPENSION INTRAMUSCULAR; INTRAVENOUS at 11:16

## 2023-03-14 RX ADMIN — HEPARIN SODIUM 5000 UNIT(S): 5000 INJECTION INTRAVENOUS; SUBCUTANEOUS at 17:05

## 2023-03-14 RX ADMIN — Medication 975 MILLIGRAM(S): at 07:00

## 2023-03-14 RX ADMIN — Medication 975 MILLIGRAM(S): at 06:52

## 2023-03-14 NOTE — DIETITIAN INITIAL EVALUATION ADULT - ADD RECOMMEND
1) Continue CSTCHO, renal diet   2) Continue Nepro 2x daily (850 kcals, 38.2g protein).   3) Monitor weights, labs, BM's, skin integrity, p.o. intake.   4) Encourage PO intake and honor food preferences as able.   5)  to provide double portions of protein with meals.  6) Reccomend Nephrovite once daily for micronutrient provision

## 2023-03-14 NOTE — DISCHARGE NOTE PROVIDER - NSDCCPTREATMENT_GEN_ALL_CORE_FT
PRINCIPAL PROCEDURE  Procedure: Irrigation and debridement of bone of hand  Findings and Treatment:        PRINCIPAL PROCEDURE  Procedure: Irrigation and debridement of bone of hand  Findings and Treatment: 48 y/o Male with PMH of DM2, b/l BKAs and ESRD on HD (MWF, Right UE AVF) presented to [facility] as a send in from Dr. Sin's office concern of repeat infection. Pt s/p multiple irrigation and debridements of right hand/forearm and right 3rd digit amputation throughout Jan-Feb 2023 with Dr. Sin and Dr. Singh. Pt is s/p repeat irrigation and debridements on 3/7/23, 3/16/23 (with wound vac placement), and repeat integra placement on 4/4/23 with Dr. Sin. Pt tolerated all procedures well with no intraoperative complications. Vascular Team re-assessed AV fistula on this admission, which they determined to still be patent. Infectious Diseases was consulted for further antibiotic recommendations. On last admission, patient completed 6 week course of Unasyn. Infectious Diseases Dr. Chamorro, recommended continuing IV Unasyn (last dose was 4/14), MR was obtained which showed no osteomyelitis. Patient tolerated physical therapy well, and the pain was controlled. Pt is weight bearing as tolerated with cane/walker as needed. Seen by medical attending for continuity of care and management and cleared for safe discharge. Keep dressing/incision clean, dry and intact. Any sutures/staples to be removed on post-op day #14 at your office visit.   Pt is on Aspirin 81mg daily for DVT prophylaxis, please take for 6 weeks unless otherwise instructed by your surgeon. Please follow up with Dr. Sin in 2 weeks, call the office to make an appointment, [phone]. Please follow up with Dr. Chamorro in 2 weeks, call office for appointment. Please follow up with your PMD for continuity of care and management as medications may have changed.

## 2023-03-14 NOTE — PROVIDER CONTACT NOTE (MEDICATION) - SITUATION
Pt K+ >6 predialysis. Returned from dialysis with stat K+ level and EKG. Unable to draw blood after mult sticks and patient in/out of bathroom x2 for BM's. Pt iv access site edemetous without blood return, dc'd and unable to replace IV.

## 2023-03-14 NOTE — DIETITIAN INITIAL EVALUATION ADULT - NS FNS DIET ORDER
Diet, Consistent Carbohydrate Renal w/Evening Snack:   Supplement Feeding Modality:  Oral  Nepro Cans or Servings Per Day:  1       Frequency:  Two Times a day (03-07-23 @ 00:17) [Active]

## 2023-03-14 NOTE — DISCHARGE NOTE PROVIDER - PROVIDER TOKENS
PROVIDER:[TOKEN:[[id number]],FOLLOWUP:[1 week]],PROVIDER:[TOKEN:[[id number]],FOLLOWUP:[1 week]]

## 2023-03-14 NOTE — OCCUPATIONAL THERAPY INITIAL EVALUATION ADULT - RANGE OF MOTION EXAMINATION, UPPER EXTREMITY
except right land limited flexion/extension in the wrist to 0-15 and severely limited movemt to all digits in the right hand with contractures/bilateral UE Active ROM was WFL  (within functional limits)

## 2023-03-14 NOTE — DIETITIAN INITIAL EVALUATION ADULT - ORAL INTAKE PTA/DIET HISTORY
Pt lives at home with his son. They cook together at home. No difficulty obtaining groceries. Pt follows renal diet at home. States he has a list of foods in his hoe that are high in phosphorous and potassium. Iron and D3 supplements/Vitamins taken PTA.  Pt lives at home with his son. They cook together at home. No difficulty obtaining groceries. Pt follows renal diet at home. States he has a list of foods in his home that are high in phosphorous and potassium. Iron and D3 supplements/Vitamins taken PTA.

## 2023-03-14 NOTE — DISCHARGE NOTE PROVIDER - REASON FOR ADMISSION
right hand/forearm irrigation and debridement  s/p multiple I&Ds, right 3rd digit amputation Right forearm gas gangrene

## 2023-03-14 NOTE — PROGRESS NOTE ADULT - ASSESSMENT
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 Vascular Surgery consulted to r/o steal syndrome, no intervention indicated per their rec's - awaiting hand MRI [ ].

## 2023-03-14 NOTE — PROGRESS NOTE ADULT - SUBJECTIVE AND OBJECTIVE BOX
Patient seen and examined at bedside. Reports no acute complaints at this time. Pain is well controlled. Potassium returned as 6 overnight. Dressing changed this morning.     ICU Vital Signs Last 24 Hrs  T(C): 36.8 (14 Mar 2023 02:00), Max: 36.8 (14 Mar 2023 02:00)  T(F): 98.3 (14 Mar 2023 02:00), Max: 98.3 (14 Mar 2023 02:00)  HR: 72 (14 Mar 2023 02:00) (66 - 72)  BP: 122/59 (14 Mar 2023 02:00) (122/59 - 168/80)  BP(mean): --  ABP: --  ABP(mean): --  RR: 18 (14 Mar 2023 02:00) (18 - 18)  SpO2: 100% (14 Mar 2023 02:00) (100% - 100%)    O2 Parameters below as of 14 Mar 2023 02:00  Patient On (Oxygen Delivery Method): room air                              8.6    7.81  )-----------( 399      ( 13 Mar 2023 15:50 )             28.1   03-13    137  |  97<L>  |  66<H>  ----------------------------<  142<H>  6.0<H>   |  19<L>  |  10.18<H>    Ca    8.4      13 Mar 2023 15:50        PHYSICAL EXAM:  Gen: NAD, alert and oriented  Resp: Unlabored breathing  Gen: awake, alert, NAD  Resp: no increased work of breathing  RUE  dressing c/d/i, dressing change done   + AIN/PIN/IO  C5-T1 SILT  compartments soft  radial pulse 2+           A/P:  Stable s/p Right hand and forearm irrigation and debridement, pending further operative I&D  - Pain control/ Analgesia  - DVT ppx A81, SQH  -PT/OT   -NWB RUE  -BID dressing changes  - FU vasc recs  - Plan to [facility] for repeat I&D

## 2023-03-14 NOTE — PROVIDER CONTACT NOTE (MEDICATION) - ASSESSMENT
EKG done. Pt without chest pain/ muscle aches or pain. L arm discomfort from mult attempts by multiple RN's for blood draw and IV access. Warm soaks and vein finder in use.

## 2023-03-14 NOTE — OCCUPATIONAL THERAPY INITIAL EVALUATION ADULT - PERTINENT HX OF CURRENT PROBLEM, REHAB EVAL
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 Sin/Dr. Singh) who was seen by Dr. Sin today in the office and sent in to [facility] for urgent irrigation and debridement.

## 2023-03-14 NOTE — DISCHARGE NOTE PROVIDER - NSDCMRMEDTOKEN_GEN_ALL_CORE_FT
acetaminophen 325 mg oral tablet: 3 tab(s) orally every 8 hours  amoxicillin-clavulanate 500 mg-125 mg oral tablet: 1 tab(s) orally once a day MDD:1 tab  aspirin 81 mg oral tablet, chewable: 1 tab(s) orally once a day  Basaglar KwikPen 100 units/mL subcutaneous solution: 4 unit(s) subcutaneous once a day (at bedtime)   iron:   oxyCODONE 5 mg oral tablet: 1 tab(s) orally every 4 hours, As Needed -for severe pain MDD:6 tabs   Vitamin D3:    acetaminophen 325 mg oral tablet: 3 tab(s) orally every 8 hours  aspirin 81 mg oral tablet, chewable: 1 tab(s) orally once a day  Basaglar KwikPen 100 units/mL subcutaneous solution: 4 unit(s) subcutaneous once a day (at bedtime)   iron:   oxyCODONE 5 mg oral tablet: 1 tab(s) orally every 4 hours, As Needed -for severe pain MDD:6 tabs   Vitamin D3:    acetaminophen 325 mg oral tablet: 3 tab(s) orally every 8 hours  amLODIPine 10 mg oral tablet: 1 tab(s) orally once a day  aspirin 81 mg oral tablet, chewable: 1 tab(s) orally once a day once a day  atorvastatin 40 mg oral tablet: 1 tab(s) orally once a day (at bedtime)  Basaglar KwikPen 100 units/mL subcutaneous solution: 4 unit(s) subcutaneous once a day (at bedtime)  collagenase 250 units/g topical ointment: Apply topically to affected area 2 times a day 1 Apply topically to affected area 2 times a day  dorzolamide-timolol 2%-0.5% preservative-free ophthalmic solution: 1 drop(s) to each affected eye 2 times a day two times per day  epoetin deidre: once with dialysis  iron:   oxyCODONE 5 mg oral tablet: 1 tab(s) orally every 6 hours as needed for Moderate Pain (4 - 6) MDD: 4 tabs  petrolatum topical ointment: Apply topically to affected area 2 times a day 1 Apply topically to affected area 2 times a day  senna leaf extract oral tablet: 2 tab(s) orally once a day (at bedtime)  Vitamin D3:

## 2023-03-14 NOTE — DISCHARGE NOTE PROVIDER - CARE PROVIDER_API CALL
Tracee Sin)  [address], [address]  [place] [address]  Phone: [phone]  Fax: [phone]  Follow Up Time: 1 week    Tori Chamorro)  Internal Medicine  [address] [place] [address], [place] [address]  Phone: [phone]  Fax: [phone]  Follow Up Time: 1 week

## 2023-03-14 NOTE — PROVIDER CONTACT NOTE (MEDICATION) - BACKGROUND
Pt with R arm protection and mult IV's/ blood draws on left with daily bloods ordered.  Pt had arrow last admission. Pt tolerating mult attempts as " Concerned about not getting IV unasyn tonight."

## 2023-03-14 NOTE — PROGRESS NOTE ADULT - ASSESSMENT
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 Sin/Dr. Singh) who was seen by Dr. Sin today in the office and sent in to [facility] for urgent irrigation and debridement. Patient states he missed dialysis today because he was instructed to come straight to the ED.     PAST MEDICAL & SURGICAL HISTORY:  ESRD on dialysis  H/O hypotension  Diabetes mellitus  S/P BKA (below knee amputation) bilateral  AV fistula (06 Mar 2023 17:32)            esrd on hd   will plan for hd as order    Excess fluids and waste products will be removed from your blood; your electrolytes will be balanced; your blood pressure will be controlled.      ANEMIA PLAN:  Anemia of chronic disease:  Well controlled by Aranesp  H and H subtherapeutic .  We will continue Aranesp aiming for a HCT of 32-36 %.   We will monitor Iron stores, B12 and RBC folate .      ,send blood and urine cx,serial lactate levels,monitor vitals closley,ivfs hydration,monitor urine output and renal profile,iv abx

## 2023-03-14 NOTE — PROGRESS NOTE ADULT - PROBLEM SELECTOR PLAN 4
-f/u renal recs and plans for HD   -renally dose medications   -anemia of renal disease, c/w epo during HD as per renal  -renal restricted diet w/ nepro supplements  -plan for HD MWF, last session 3/13 and next session planned for tomorrow  -f/u K+ levels

## 2023-03-14 NOTE — DISCHARGE NOTE PROVIDER - NSDCFUADDINST_GEN_ALL_CORE_FT
?WB status Per Dr. Sin, patient to do the following daily:  Aquaphor over entire hand and forearm  Adaptic to be placed over integra site  Kerlix bolster to be placed on top of adaptic  Kerlix to be wrapped over kerlix bolster, hand and forearm   Last to be placed is ACE wrap over entire hand and forearm Per Dr. Sin, patient to do the following daily:  Aquaphor over entire hand and forearm  Adaptic to be placed over integra site  Kerlix to be wrapped over hand and forearm   Last to be placed is ACE wrap over entire hand and forearm

## 2023-03-14 NOTE — DIETITIAN INITIAL EVALUATION ADULT - OTHER INFO
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 Vascular Surgery consulted to r/o steal syndrome, no intervention indicated per their rec's. Still awaiting MRI.    Nutrition interview: Pt A&Ox4, Belgian speaking but able to understand and speak most english.  services offered but declined at this time. No recent episodes of nausea, vomiting, diarrhea or constipation, last BM noted on 3/14 per pt, receiving senna. States that he had issue with constipation when he was receiving pain meds, no longer an issue. Denies any chewing/swallowing difficulties. Food allergy to turkey, diet office aware. Stated UBW: ~150#, small fluid shifts 2/2 ESRD on HD. Intake is 50-75% per RN flowsheets and per pt, observed with good intake of lunch. Pt states his doctor told him to focus on protein intake, pt pointed at nepro and said "no protein". Educated patient that supplement is very high in protein and encouraged to drink. Pt with hyperkalemia and hyperphosphatemia, receiving appropriate renal diet. Pt able to teach back foods that's were high in phosphorous and potassium and he does not eat at home. Feeding skills: independent. Pt with well controlled DM, A1c 5.5%. 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 Vascular Surgery consulted to r/o steal syndrome, no intervention indicated per their rec's. Still awaiting MRI.    Nutrition interview: Pt A&Ox4, Indian speaking but able to understand and speak most english.  services offered but declined at this time. No recent episodes of nausea, vomiting, diarrhea or constipation, last BM noted on 3/14 per pt, receiving senna. States that he had issue with constipation when he was receiving pain meds, no longer an issue. Denies any chewing/swallowing difficulties. Food allergy to turkey, diet office aware. Stated UBW: ~150#, small fluid shifts 2/2 ESRD on HD. Intake is 50-75% per RN flowsheets and per pt, observed with good intake of lunch. Pt states his doctor told him to focus on protein intake, pt pointed at nepro and said "no protein". Educated patient that supplement is very high in protein and encouraged to drink. Pt with hyperkalemia and hyperphosphatemia, receiving appropriate renal diet. Pt taking velphoro (phos binder) with meals at bedside. Lokelma given 3/13. Pt able to teach back foods that's were high in phosphorous and potassium and he does not eat at home. Feeding skills: independent. Pt with well controlled DM, A1c 5.5%. Pt with bilateral BKA, Adjusted BW ~161#/73Kg, BMI: 25.2.

## 2023-03-14 NOTE — DISCHARGE NOTE PROVIDER - NSDCCPCAREPLAN_GEN_ALL_CORE_FT
PRINCIPAL DISCHARGE DIAGNOSIS  Diagnosis: Abscess of right hand  Assessment and Plan of Treatment:

## 2023-03-14 NOTE — DISCHARGE NOTE PROVIDER - CARE PROVIDERS DIRECT ADDRESSES
,[email or web address]

## 2023-03-14 NOTE — DISCHARGE NOTE PROVIDER - HOSPITAL COURSE
48 y/o Male with PMH of DM2, b/l BKAs and ESRD on HD (MWF, Right UE AVF) presented to [facility] as a send in from Dr. Sin's office concern of repeat infection. Pt s/p multiple irrigation and debridements of right hand/forearm and right 3rd digit amputation throughout Jan-Feb 2023 with Dr. Sin and Dr. Singh. Pt is s/p repeat irrigation and debridements on 3/7/23, 3/16/23 with Dr. Sin. Pt tolerated all procedures well with no intraoperative complications. Vascular Team re-assessed AV fistula on this admission, which they determined to still be patent. Infectious Diseases was consulted for further antibiotic recommendations. On last admission, patient completed 6 week course of Unasyn. Infectious Diseases Dr. Chamorro, recommended continuing IV Unasyn, MRI was obtained which showed no osteomyelitis. At this time, ID recommending ......... course of ..........    Patient tolerated physical therapy well, and the pain was controlled. Pt is weight bearing as tolerated with cane/walker as needed. Seen by medical attending for continuity of care and management and cleared for safe discharge. Keep dressing/incision clean, dry and intact. Any sutures/staples to be removed on post-op day #14 at your office visit.   Pt is on  mg    for DVT prophylaxis, please take for 6 weeks unless otherwise instructed by your surgeon. Please follow up with Dr. Sin on ......., call the office to make an appointment, [phone]. Please follow up with Dr. Chamorro in 2 weeks, call office for appointment. Please follow up with your PMD for continuity of care and management as medications may have changed. 48 y/o Male with PMH of DM2, b/l BKAs and ESRD on HD (MWF, Right UE AVF) presented to [facility] as a send in from Dr. Sin's office concern of repeat infection. Pt s/p multiple irrigation and debridements of right hand/forearm and right 3rd digit amputation throughout Jan-Feb 2023 with Dr. Sin and Dr. Singh. Pt is s/p repeat irrigation and debridements on 3/7/23, 3/16/23 (with wound vac placement) with Dr. Sin. Pt tolerated all procedures well with no intraoperative complications. Vascular Team re-assessed AV fistula on this admission, which they determined to still be patent. Infectious Diseases was consulted for further antibiotic recommendations. On last admission, patient completed 6 week course of Unasyn. Infectious Diseases Dr. Chamorro, recommended continuing IV Unasyn, MRI was obtained which showed no osteomyelitis. At this time, ID recommending ......... course of ..........    Patient tolerated physical therapy well, and the pain was controlled. Pt is weight bearing as tolerated with cane/walker as needed. Seen by medical attending for continuity of care and management and cleared for safe discharge. Keep dressing/incision clean, dry and intact. Any sutures/staples to be removed on post-op day #14 at your office visit.   Pt is on  mg    for DVT prophylaxis, please take for 6 weeks unless otherwise instructed by your surgeon. Please follow up with Dr. Sin on ......., call the office to make an appointment, [phone]. Please follow up with Dr. Chamorro in 2 weeks, call office for appointment. Please follow up with your PMD for continuity of care and management as medications may have changed. 48 y/o Male with PMH of DM2, b/l BKAs and ESRD on HD (MWF, Right UE AVF) presented to [facility] as a send in from Dr. Sin's office concern of repeat infection. Pt s/p multiple irrigation and debridements of right hand/forearm and right 3rd digit amputation throughout Jan-Feb 2023 with Dr. Sin and Dr. Singh. Pt is s/p repeat irrigation and debridements on 3/7/23, 3/16/23 (with wound vac placement), and repeat integra placement on 4/4/23 with Dr. Sin. Pt tolerated all procedures well with no intraoperative complications. Vascular Team re-assessed AV fistula on this admission, which they determined to still be patent. Infectious Diseases was consulted for further antibiotic recommendations. On last admission, patient completed 6 week course of Unasyn. Infectious Diseases Dr. Chamorro, recommended continuing IV Unasyn, MRI was obtained which showed no osteomyelitis. At this time, ID recommending ......... course of ..........    Patient tolerated physical therapy well, and the pain was controlled. Pt is weight bearing as tolerated with cane/walker as needed. Seen by medical attending for continuity of care and management and cleared for safe discharge. Keep dressing/incision clean, dry and intact. Any sutures/staples to be removed on post-op day #14 at your office visit.   Pt is on  mg    for DVT prophylaxis, please take for 6 weeks unless otherwise instructed by your surgeon. Please follow up with Dr. Sin on ......., call the office to make an appointment, [phone]. Please follow up with Dr. Chamorro in 2 weeks, call office for appointment. Please follow up with your PMD for continuity of care and management as medications may have changed. 46 y/o Male with PMH of DM2, b/l BKAs and ESRD on HD (MWF, Right UE AVF) presented to [facility] as a send in from Dr. Sin's office concern of repeat infection. Pt s/p multiple irrigation and debridements of right hand/forearm and right 3rd digit amputation throughout Jan-Feb 2023 with Dr. Sin and Dr. Singh. Pt is s/p repeat irrigation and debridements on 3/7/23, 3/16/23 (with wound vac placement), and repeat integra placement on 4/4/23 with Dr. Sin. Pt tolerated all procedures well with no intraoperative complications. Vascular Team re-assessed AV fistula on this admission, which they determined to still be patent. Infectious Diseases was consulted for further antibiotic recommendations. On last admission, patient completed 6 week course of Unasyn. Infectious Diseases Dr. Chamorro, recommended continuing IV Unasyn (last dose was 4/14), MR was obtained which showed no osteomyelitis. Patient tolerated physical therapy well, and the pain was controlled. Pt is weight bearing as tolerated with cane/walker as needed. Seen by medical attending for continuity of care and management and cleared for safe discharge. Keep dressing/incision clean, dry and intact. Any sutures/staples to be removed on post-op day #14 at your office visit.   Pt is on Aspirin 81mg daily for DVT prophylaxis, please take for 6 weeks unless otherwise instructed by your surgeon. Please follow up with Dr. Sin in 2 weeks, call the office to make an appointment, [phone]. Please follow up with Dr. Chamorro in 2 weeks, call office for appointment. Please follow up with your PMD for continuity of care and management as medications may have changed.     Per Dr. Sin, patient to do the following to do daily Aquaphor over entire hand and forearm, with adaptic over integra site then kerlix straight out of packaging and onto adaptic. Then wrap with ACE. 48 y/o Male with PMH of DM2, b/l BKAs and ESRD on HD (MWF, Right UE AVF) [facility] Dr. Sin's office concern of repeat infection. Pt s/p multiple irrigation and debridements of right hand/forearm and right 3rd digit amputation throughout Jan-Feb 2023 with Dr. Sin and Dr. Singh. Pt is s/p repeat irrigation and debridements on 3/7/23, 3/16/23 (with wound vac placement), and repeat integra placement on 4/4/23 with Dr. Sin. Pt tolerated all procedures well with no intraoperative complications. Vascular Team re-assessed AV fistula on this admission, which they determined to still be patent. Infectious Diseases was consulted for further antibiotic recommendations. On last admission, patient completed 6 week course of Unasyn. Infectious Diseases Dr. Chamorro, recommended continuing IV Unasyn (last dose was 4/14), MR was obtained which showed no osteomyelitis. Patient tolerated physical therapy well, and the pain was controlled. Pt is weight bearing as tolerated with cane/walker as needed. Seen by medical attending for continuity of care and management and cleared for safe discharge. Keep dressing/incision clean, dry and intact. Any sutures/staples to be removed on post-op day #14 at your office visit.   Pt is on Aspirin 81mg daily for DVT prophylaxis, please take for 6 weeks unless otherwise instructed by your surgeon. Please follow up with Dr. Sin in 2 weeks, call the office to make an appointment, [phone]. Please follow up with Dr. Chamorro in 2 weeks, call office for appointment. Please follow up with your PMD for continuity of care and management as medications may have changed.     Per Dr. Sin, patient to do the following daily:  Aquaphor over entire hand and forearm, with adaptic over integra site then kerlix straight out of packaging and onto adaptic. Then wrap with ACE. 48 y/o Male with PMH of DM2, b/l BKAs and ESRD on HD (MWF, Right [facility] Dr. [facility] office concern of repeat infection. Pt s/p multiple irrigation and debridements of right hand/forearm and right 3rd digit amputation throughout Jan-Feb 2023 with Dr. Sin and Dr. Singh. Pt is s/p repeat irrigation and debridements on 3/7/23, 3/16/23 (with wound vac placement), and repeat integra placement on 4/4/23 with Dr. Sin. Pt tolerated all procedures well with no intraoperative complications. Vascular Team re-assessed AV fistula on this admission, which they determined to still be patent. Infectious Diseases was consulted for further antibiotic recommendations. On last admission, patient completed 6 week course of Unasyn. Infectious Diseases Dr. Chamorro, recommended continuing IV Unasyn (last dose was 4/14), MR was obtained which showed no osteomyelitis. Patient tolerated physical therapy well, and the pain was controlled. Pt is weight bearing as tolerated with cane/walker as needed. Seen by medical attending for continuity of care and management and cleared for safe discharge. Keep dressing/incision clean, dry and intact. Any sutures/staples to be removed on post-op day #14 at your office visit.   Pt is on Aspirin 81mg daily for DVT prophylaxis, please take for 6 weeks unless otherwise instructed by your surgeon. Please follow up with Dr. Sin in 2 weeks, call the office to make an appointment, [phone]. Please follow up with Dr. Chamorro in 2 weeks, call office for appointment. Please follow up with your PMD for continuity of care and management as medications may have changed.     Per Dr. Sin, patient to do the following daily:  Aquaphor over entire hand and forearm  Adaptic to be placed over integra site  Kerlix bolster to be placed on top of adaptic  Kerlix to be wrapped over kerlix bolster, hand and forearm   Last to be placed is ACE wrap over entire hand and forearm 46 y/o Male with PMH of DM2, b/l BKAs and ESRD on HD (MWF, Right UE AVF[facility] Dr. Sin[facility] office concern of repeat infection. Pt s/p multiple irrigation and debridements of right hand/forearm and right 3rd digit amputation throughout Jan-Feb 2023 with Dr. Sin and Dr. Singh. Pt is s/p repeat irrigation and debridements on 3/7/23, 3/16/23 (with wound vac placement), and repeat integra placement on 4/4/23 with Dr. Sin. Pt tolerated all procedures well with no intraoperative complications. Vascular Team re-assessed AV fistula on this admission, which they determined to still be patent. Infectious Diseases was consulted for further antibiotic recommendations. On last admission, patient completed 6 week course of Unasyn. Infectious Diseases Dr. Chamorro, recommended continuing IV Unasyn (last dose was 4/14), MR was obtained which showed no osteomyelitis. Patient tolerated physical therapy well, and the pain was controlled. Pt is weight bearing as tolerated with cane/walker as needed. Seen by medical attending for continuity of care and management and cleared for safe discharge. Keep dressing/incision clean, dry and intact. Any sutures/staples to be removed on post-op day #14 at your office visit.   Pt is on Aspirin 81mg daily for DVT prophylaxis, please take for 6 weeks unless otherwise instructed by your surgeon. Please follow up with Dr. Sin in 2 weeks, call the office to make an appointment, [phone]. Please follow up with Dr. Chamorro in 2 weeks, call office for appointment. Please follow up with your PMD for continuity of care and management as medications may have changed.     Per Dr. Sin, patient/VNS to do the following daily:  Aquaphor over entire hand and forearm  Adaptic to be placed over integra site  Kerlix to be wrapped over hand and forearm   Last to be placed is ACE wrap over entire hand and forearm

## 2023-03-14 NOTE — PROGRESS NOTE ADULT - PROBLEM SELECTOR PLAN 5
-diabetic retinopathy.  severe proliferative diabetic retinopathy, previously on cosopt, optho outpt follow up   -TfJ8q=9.5%. FS better controlled in-house.  -c/w ISS for now and continue to monitor FS closely.  -patient on 4 units premeals and 5 units at bedtime  -c/w lantus to 7units subq at bedtime and admelog 2units qAC before meals  -will titrate up as necessary  -c/w ASA daily

## 2023-03-14 NOTE — PROGRESS NOTE ADULT - SUBJECTIVE AND OBJECTIVE BOX
CHIEF COMPLAINT: f/u right forearm gas gangrene    SUBJECTIVE / OVERNIGHT EVENTS: Patient seen and examined. No acute events overnight. Pain well controlled and patient without any complaints.    MEDICATIONS  (STANDING):  acetaminophen     Tablet .. 975 milliGRAM(s) Oral every 8 hours  ampicillin/sulbactam  IVPB      ampicillin/sulbactam  IVPB 3 Gram(s) IV Intermittent every 24 hours  aspirin  chewable 81 milliGRAM(s) Oral daily  collagenase Ointment 1 Application(s) Topical two times a day  dextrose 5%. 1000 milliLiter(s) (50 mL/Hr) IV Continuous <Continuous>  dextrose 5%. 1000 milliLiter(s) (100 mL/Hr) IV Continuous <Continuous>  dextrose 50% Injectable 25 Gram(s) IV Push once  dextrose 50% Injectable 12.5 Gram(s) IV Push once  dextrose 50% Injectable 25 Gram(s) IV Push once  dorzolamide 2%/timolol 0.5% Ophthalmic Solution 1 Drop(s) Both EYES two times a day  glucagon  Injectable 1 milliGRAM(s) IntraMuscular once  heparin   Injectable 5000 Unit(s) SubCutaneous every 12 hours  insulin glargine Injectable (LANTUS) 7 Unit(s) SubCutaneous at bedtime  insulin lispro (ADMELOG) corrective regimen sliding scale   SubCutaneous at bedtime  insulin lispro (ADMELOG) corrective regimen sliding scale   SubCutaneous three times a day before meals  insulin lispro Injectable (ADMELOG) 2 Unit(s) SubCutaneous three times a day before meals  senna 2 Tablet(s) Oral at bedtime    MEDICATIONS  (PRN):  dextrose Oral Gel 15 Gram(s) Oral once PRN Blood Glucose LESS THAN 70 milliGRAM(s)/deciliter    VITALS:  T(F): 98.3 (03-14-23 @ 09:45), Max: 98.3 (03-14-23 @ 02:00)  HR: 78 (03-14-23 @ 09:45) (69 - 78)  BP: 138/70 (03-14-23 @ 09:45) (122/59 - 168/80)  RR: 17 (03-14-23 @ 09:45) (17 - 18)  SpO2: 100% (03-14-23 @ 09:45)    PHYSICAL EXAM:  GENERAL: NAD, well-developed  CHEST/LUNG: Clear to auscultation bilaterally; No wheeze  HEART: Regular rate and rhythm; No murmurs, rubs, or gallops  ABDOMEN: Soft, Nontender, Nondistended; Bowel sounds present  EXTREMITIES:  B/L LE amputations +BKA  SKIN: Right hand dressing C/D/I    LABS:              10.5                 138  | 25   | 39           7.12  >-----------< 445     ------------------------< 89                    34.5                 4.7  | 95   | 6.71                                         Ca 8.8   Mg x     Ph x        CAPILLARY BLOOD GLUCOSE  POCT Blood Glucose.: 105 mg/dL (14 Mar 2023 11:17)  POCT Blood Glucose.: 151 mg/dL (14 Mar 2023 07:45)  POCT Blood Glucose.: 205 mg/dL (13 Mar 2023 22:13)  POCT Blood Glucose.: 143 mg/dL (13 Mar 2023 18:36)  POCT Blood Glucose.: 138 mg/dL (13 Mar 2023 15:08)      RADIOLOGY & ADDITIONAL TESTS:  Imaging Personally Reviewed: [x] Yes  < from: MR Forearm w/wo IV Cont, Right (03.13.23 @ 13:44) >  IMPRESSION:  Soft tissue defect roughly spanning 2.8 cm TRV by 5 cm CC at the palmar   aspect of the distal forearm. There is a 2.6 cm rim-enhancing fluid   collection within the soft tissues at the radial aspect of the skin   defect with adjacent rim enhancement representing abscess formation.   There are additional subcentimeter phlegmonous foci with surrounding soft   tissue enhancement within the soft tissues to the radial aspect of the   soft tissue defect. Surrounding subcutaneous and musculature edema.    < end of copied text >      [ ] Consultant(s) Notes Reviewed:  [x] Care Discussed with Consultants/Other Providers: Orthopedic PA - discussed management of hyperkalemia

## 2023-03-14 NOTE — DIETITIAN INITIAL EVALUATION ADULT - PERTINENT LABORATORY DATA
03-14 Na 138 mmol/L Glu 89 mg/dL K+ 4.7 mmol/L Cr 6.71 mg/dL<H> BUN 39 mg/dL<H> Phos n/a    03-14 @ 11:17 POCT 105 mg/dL  03-14 @ 07:45 POCT 151 mg/dL  03-13 @ 22:13 POCT 205 mg/dL  03-13 @ 18:36 POCT 143 mg/dL  03-13 @ 15:08 POCT 138 mg/dL    A1C with Estimated Average Glucose Result: 5.5 % (03-08-23 @ 05:58)  A1C with Estimated Average Glucose Result: 7.1 % (01-10-23 @ 16:38)   03-14 Na 138 mmol/L Glu 89 mg/dL K+ 4.7 mmol/L Cr 6.71 mg/dL<H> BUN 39 mg/dL<H> Phos n/a    03-14 @ 11:17 POCT 105 mg/dL  A1C with Estimated Average Glucose Result: 5.5 % (03-08-23 @ 05:58)  A1C with Estimated Average Glucose Result: 7.1 % (01-10-23 @ 16:38)

## 2023-03-14 NOTE — PROGRESS NOTE ADULT - SUBJECTIVE AND OBJECTIVE BOX
Patient is a 47y Male whom presented to the hospital with esrd on hd    PAST MEDICAL & SURGICAL HISTORY:  ESRD on dialysis      H/O hypotension      Diabetes mellitus      S/P BKA (below knee amputation) bilateral      AV fistula          MEDICATIONS  (STANDING):  acetaminophen     Tablet .. 975 milliGRAM(s) Oral every 8 hours  ampicillin/sulbactam  IVPB      aspirin  chewable 81 milliGRAM(s) Oral daily  dextrose 5%. 1000 milliLiter(s) (50 mL/Hr) IV Continuous <Continuous>  dextrose 5%. 1000 milliLiter(s) (100 mL/Hr) IV Continuous <Continuous>  dextrose 50% Injectable 25 Gram(s) IV Push once  dextrose 50% Injectable 12.5 Gram(s) IV Push once  dextrose 50% Injectable 25 Gram(s) IV Push once  glucagon  Injectable 1 milliGRAM(s) IntraMuscular once  insulin lispro (ADMELOG) corrective regimen sliding scale   SubCutaneous three times a day before meals  lactated ringers. 1000 milliLiter(s) (100 mL/Hr) IV Continuous <Continuous>      Allergies    No Known Drug Allergies  [place] (Rash)    Intolerances        SOCIAL HISTORY:  Denies ETOh,Smoking,     FAMILY HISTORY:  No pertinent family history in first degree relatives        REVIEW OF SYSTEMS:    CONSTITUTIONAL: No weakness, fevers or chills                                                 10.5   7.12  )-----------( 445      ( 14 Mar 2023 09:42 )             34.5       CBC Full  -  ( 14 Mar 2023 09:42 )  WBC Count : 7.12 K/uL  RBC Count : 4.00 M/uL  Hemoglobin : 10.5 g/dL  Hematocrit : 34.5 %  Platelet Count - Automated : 445 K/uL  Mean Cell Volume : 86.3 fL  Mean Cell Hemoglobin : 26.3 pg  Mean Cell Hemoglobin Concentration : 30.4 gm/dL  Auto Neutrophil # : x  Auto Lymphocyte # : x  Auto Monocyte # : x  Auto Eosinophil # : x  Auto Basophil # : x  Auto Neutrophil % : x  Auto Lymphocyte % : x  Auto Monocyte % : x  Auto Eosinophil % : x  Auto Basophil % : x      03-14    138  |  95<L>  |  39<H>  ----------------------------<  89  4.7   |  25  |  6.71<H>    Ca    8.8      14 Mar 2023 09:42        CAPILLARY BLOOD GLUCOSE      POCT Blood Glucose.: 140 mg/dL (14 Mar 2023 16:38)  POCT Blood Glucose.: 105 mg/dL (14 Mar 2023 11:17)  POCT Blood Glucose.: 151 mg/dL (14 Mar 2023 07:45)  POCT Blood Glucose.: 205 mg/dL (13 Mar 2023 22:13)      Vital Signs Last 24 Hrs  T(C): 36.6 (14 Mar 2023 17:57), Max: 36.8 (14 Mar 2023 02:00)  T(F): 97.9 (14 Mar 2023 17:57), Max: 98.3 (14 Mar 2023 02:00)  HR: 69 (14 Mar 2023 17:57) (69 - 78)  BP: 161/82 (14 Mar 2023 17:57) (122/59 - 161/82)  BP(mean): --  RR: 17 (14 Mar 2023 17:57) (17 - 18)  SpO2: 100% (14 Mar 2023 17:57) (100% - 100%)    Parameters below as of 14 Mar 2023 17:57  Patient On (Oxygen Delivery Method): room air                    PHYSICAL EXAM:    Constitutional: NAD  HEENT: conjunctive   clear   Neck:  No JVD  Respiratory: CTAB  Cardiovascular: S1 and S2  Gastrointestinal: BS+, soft, NT/ND  Extremities: No peripheral edema ,  Prior right 3rd digit amputation.S/P BKA (below knee amputation) bilateral  Access: pos fistula

## 2023-03-14 NOTE — DIETITIAN INITIAL EVALUATION ADULT - LITERATURE/VIDEOS GIVEN
Renal diet education reviewed with pt. Pt able to teach back information he already knows regarding renal diet. Pt able to name foods that he avoids that are high in phosphorous and potassium. Pt very focused on receiving enough protein and able to teach back good sources of protein. Pt demonstrated understanding of renal diet education.

## 2023-03-14 NOTE — DIETITIAN INITIAL EVALUATION ADULT - PERTINENT MEDS FT
MEDICATIONS  (STANDING):  acetaminophen     Tablet .. 975 milliGRAM(s) Oral every 8 hours  ampicillin/sulbactam  IVPB      ampicillin/sulbactam  IVPB 3 Gram(s) IV Intermittent every 24 hours  aspirin  chewable 81 milliGRAM(s) Oral daily  collagenase Ointment 1 Application(s) Topical two times a day  dextrose 5%. 1000 milliLiter(s) (50 mL/Hr) IV Continuous <Continuous>  dextrose 5%. 1000 milliLiter(s) (100 mL/Hr) IV Continuous <Continuous>  dextrose 50% Injectable 25 Gram(s) IV Push once  dextrose 50% Injectable 12.5 Gram(s) IV Push once  dextrose 50% Injectable 25 Gram(s) IV Push once  dorzolamide 2%/timolol 0.5% Ophthalmic Solution 1 Drop(s) Both EYES two times a day  glucagon  Injectable 1 milliGRAM(s) IntraMuscular once  heparin   Injectable 5000 Unit(s) SubCutaneous every 12 hours  insulin glargine Injectable (LANTUS) 7 Unit(s) SubCutaneous at bedtime  insulin lispro (ADMELOG) corrective regimen sliding scale   SubCutaneous three times a day before meals  insulin lispro (ADMELOG) corrective regimen sliding scale   SubCutaneous at bedtime  insulin lispro Injectable (ADMELOG) 2 Unit(s) SubCutaneous three times a day before meals  senna 2 Tablet(s) Oral at bedtime    MEDICATIONS  (PRN):  dextrose Oral Gel 15 Gram(s) Oral once PRN Blood Glucose LESS THAN 70 milliGRAM(s)/deciliter

## 2023-03-15 ENCOUNTER — TRANSCRIPTION ENCOUNTER (OUTPATIENT)
Age: 48
End: 2023-03-15

## 2023-03-15 LAB
ANION GAP SERPL CALC-SCNC: 20 MMOL/L — HIGH (ref 7–14)
APTT BLD: 32.6 SEC — SIGNIFICANT CHANGE UP (ref 27–36.3)
BLD GP AB SCN SERPL QL: POSITIVE — SIGNIFICANT CHANGE UP
BUN SERPL-MCNC: 59 MG/DL — HIGH (ref 7–23)
CALCIUM SERPL-MCNC: 8.5 MG/DL — SIGNIFICANT CHANGE UP (ref 8.4–10.5)
CHLORIDE SERPL-SCNC: 96 MMOL/L — LOW (ref 98–107)
CO2 SERPL-SCNC: 20 MMOL/L — LOW (ref 22–31)
CREAT SERPL-MCNC: 8.97 MG/DL — HIGH (ref 0.5–1.3)
EGFR: 7 ML/MIN/1.73M2 — LOW
GLUCOSE BLDC GLUCOMTR-MCNC: 119 MG/DL — HIGH (ref 70–99)
GLUCOSE BLDC GLUCOMTR-MCNC: 125 MG/DL — HIGH (ref 70–99)
GLUCOSE BLDC GLUCOMTR-MCNC: 160 MG/DL — HIGH (ref 70–99)
GLUCOSE BLDC GLUCOMTR-MCNC: 162 MG/DL — HIGH (ref 70–99)
GLUCOSE BLDC GLUCOMTR-MCNC: 97 MG/DL — SIGNIFICANT CHANGE UP (ref 70–99)
GLUCOSE SERPL-MCNC: 105 MG/DL — HIGH (ref 70–99)
HCT VFR BLD CALC: 30.8 % — LOW (ref 39–50)
HGB BLD-MCNC: 9.5 G/DL — LOW (ref 13–17)
INR BLD: 1.13 RATIO — SIGNIFICANT CHANGE UP (ref 0.88–1.16)
MCHC RBC-ENTMCNC: 26.8 PG — LOW (ref 27–34)
MCHC RBC-ENTMCNC: 30.8 GM/DL — LOW (ref 32–36)
MCV RBC AUTO: 87 FL — SIGNIFICANT CHANGE UP (ref 80–100)
NRBC # BLD: 0 /100 WBCS — SIGNIFICANT CHANGE UP (ref 0–0)
NRBC # FLD: 0 K/UL — SIGNIFICANT CHANGE UP (ref 0–0)
PLATELET # BLD AUTO: 457 K/UL — HIGH (ref 150–400)
POTASSIUM SERPL-MCNC: 5.9 MMOL/L — HIGH (ref 3.5–5.3)
POTASSIUM SERPL-SCNC: 5.9 MMOL/L — HIGH (ref 3.5–5.3)
PROTHROM AB SERPL-ACNC: 13.1 SEC — SIGNIFICANT CHANGE UP (ref 10.5–13.4)
RBC # BLD: 3.54 M/UL — LOW (ref 4.2–5.8)
RBC # FLD: 15.3 % — HIGH (ref 10.3–14.5)
RH IG SCN BLD-IMP: POSITIVE — SIGNIFICANT CHANGE UP
SODIUM SERPL-SCNC: 136 MMOL/L — SIGNIFICANT CHANGE UP (ref 135–145)
WBC # BLD: 7.16 K/UL — SIGNIFICANT CHANGE UP (ref 3.8–10.5)
WBC # FLD AUTO: 7.16 K/UL — SIGNIFICANT CHANGE UP (ref 3.8–10.5)

## 2023-03-15 PROCEDURE — 99233 SBSQ HOSP IP/OBS HIGH 50: CPT

## 2023-03-15 RX ORDER — SODIUM ZIRCONIUM CYCLOSILICATE 10 G/10G
10 POWDER, FOR SUSPENSION ORAL ONCE
Refills: 0 | Status: COMPLETED | OUTPATIENT
Start: 2023-03-15 | End: 2023-03-16

## 2023-03-15 RX ORDER — HEPARIN SODIUM 5000 [USP'U]/ML
5000 INJECTION INTRAVENOUS; SUBCUTANEOUS EVERY 12 HOURS
Refills: 0 | Status: DISCONTINUED | OUTPATIENT
Start: 2023-03-15 | End: 2023-03-15

## 2023-03-15 RX ORDER — INSULIN GLARGINE 100 [IU]/ML
3 INJECTION, SOLUTION SUBCUTANEOUS AT BEDTIME
Refills: 0 | Status: COMPLETED | OUTPATIENT
Start: 2023-03-15 | End: 2023-03-15

## 2023-03-15 RX ORDER — CHLORHEXIDINE GLUCONATE 213 G/1000ML
1 SOLUTION TOPICAL DAILY
Refills: 0 | Status: DISCONTINUED | OUTPATIENT
Start: 2023-03-15 | End: 2023-03-28

## 2023-03-15 RX ADMIN — Medication 1 APPLICATION(S): at 05:44

## 2023-03-15 RX ADMIN — CHLORHEXIDINE GLUCONATE 1 APPLICATION(S): 213 SOLUTION TOPICAL at 22:34

## 2023-03-15 RX ADMIN — HEPARIN SODIUM 5000 UNIT(S): 5000 INJECTION INTRAVENOUS; SUBCUTANEOUS at 05:44

## 2023-03-15 RX ADMIN — DORZOLAMIDE HYDROCHLORIDE TIMOLOL MALEATE 1 DROP(S): 20; 5 SOLUTION/ DROPS OPHTHALMIC at 05:44

## 2023-03-15 RX ADMIN — Medication 81 MILLIGRAM(S): at 11:31

## 2023-03-15 RX ADMIN — AMPICILLIN SODIUM AND SULBACTAM SODIUM 200 GRAM(S): 250; 125 INJECTION, POWDER, FOR SUSPENSION INTRAMUSCULAR; INTRAVENOUS at 10:06

## 2023-03-15 RX ADMIN — Medication 975 MILLIGRAM(S): at 00:12

## 2023-03-15 RX ADMIN — Medication 1 APPLICATION(S): at 22:35

## 2023-03-15 RX ADMIN — INSULIN GLARGINE 3 UNIT(S): 100 INJECTION, SOLUTION SUBCUTANEOUS at 21:25

## 2023-03-15 RX ADMIN — Medication 2 UNIT(S): at 11:31

## 2023-03-15 RX ADMIN — Medication 975 MILLIGRAM(S): at 07:55

## 2023-03-15 RX ADMIN — Medication 2 UNIT(S): at 20:36

## 2023-03-15 RX ADMIN — Medication 975 MILLIGRAM(S): at 08:55

## 2023-03-15 RX ADMIN — Medication 2: at 07:53

## 2023-03-15 RX ADMIN — DORZOLAMIDE HYDROCHLORIDE TIMOLOL MALEATE 1 DROP(S): 20; 5 SOLUTION/ DROPS OPHTHALMIC at 20:39

## 2023-03-15 RX ADMIN — Medication 975 MILLIGRAM(S): at 01:23

## 2023-03-15 RX ADMIN — Medication 2 UNIT(S): at 07:53

## 2023-03-15 NOTE — PROGRESS NOTE ADULT - PROBLEM SELECTOR PLAN 5
-f/u renal recs and plans for HD   -renally dose medications   -anemia of renal disease, c/w epo during HD as per renal  -renal restricted diet w/ nepro supplements  -plan for HD MWF, last session 3/13 and next session planned for today  -f/u BMP (K+ levels) post-HD

## 2023-03-15 NOTE — PROGRESS NOTE ADULT - ASSESSMENT
This is a 48yo Maldivian speaking male with PMH of DM2, b/l BKA, ESRD (on HD MWF) well known to Orthopedics for treatment of right third finger 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 Sin/Dr. Singh) who was seen by Dr. Sin today in the office and sent in to [facility] for urgent irrigation and debridement. Patient states he missed dialysis today because he was instructed to come straight to the ED.     PAST MEDICAL & SURGICAL HISTORY:  ESRD on dialysis  H/O hypotension  Diabetes mellitus  S/P BKA (below knee amputation) bilateral  AV fistula (06 Mar 2023 17:32)            esrd on hd   will plan for hd as order    Excess fluids and waste products will be removed from your blood; your electrolytes will be balanced; your blood pressure will be controlled.      ANEMIA PLAN:  Anemia of chronic disease:  Well controlled by Aranesp  H and H subtherapeutic .  We will continue Aranesp aiming for a HCT of 32-36 %.   We will monitor Iron stores, B12 and RBC folate .      ,send blood and urine cx,serial lactate levels,monitor vitals closley,ivfs hydration,monitor urine output and renal profile,iv abx

## 2023-03-15 NOTE — PROGRESS NOTE ADULT - PROBLEM SELECTOR PLAN 2
-recurrent infections requiring irrigations and debridements.   -s/p urgent irrigation and debridement as above  -ID on board and following:     --f/u BCx (3/6) - NGT    --no OR Cx sent    --MRSA PCR negative   -c/w Unasyn; can d/c vanco per ID  -f/u MRI right hand [ ]; patient had the MRI of the hand last night but images were poor and needs to be repeated  -pain control with oxycodone IR prn and tylenol tid  -rest of care as per orthopedic, ID and vascular teams  -per vascular team, "AVF duplex reviewed and wounds improving....No acute vascular surgery intervention."  -patient to f/u with Dr. Palmer at discharge  -most likely plan for OR on thursday per orthopedic team - f/u their recs

## 2023-03-15 NOTE — PROVIDER CONTACT NOTE (OTHER) - RECOMMENDATIONS
No HD session for today.
Attempt was made to find out scheduled dialysis time but dialysis did not return call regarding nephrologist recommendation.
Start HD and continue to monitor BP throughout treatment
US line

## 2023-03-15 NOTE — PROVIDER CONTACT NOTE (OTHER) - SITUATION
Patient noted to be hard stick. Multiple attempts at getting IV access were attempted.
Patient scheduled to have dialysis session today at bedside. Patient notified nurse at 10:10 that he will not receive dialysis if they do not come by 11AM.
Patients BP is 194/100 pre dialysis
Pt admitted for infection of right hand, hx of T2DM and ESRD.

## 2023-03-15 NOTE — PROGRESS NOTE ADULT - ASSESSMENT
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 Vascular Surgery consulted to r/o steal syndrome, no intervention indicated per their rec's - awaiting hand MRI [ ] and plan for further I&D in am.

## 2023-03-15 NOTE — PROGRESS NOTE ADULT - SUBJECTIVE AND OBJECTIVE BOX
Patient seen and examined at bedside. Reports no acute complaints at this time. Pain is well controlled. No acute events overnight.    ICU Vital Signs Last 24 Hrs  T(C): 36.5 (15 Mar 2023 05:49), Max: 36.8 (14 Mar 2023 09:45)  T(F): 97.7 (15 Mar 2023 05:49), Max: 98.3 (14 Mar 2023 09:45)  HR: 70 (15 Mar 2023 05:49) (69 - 78)  BP: 133/65 (15 Mar 2023 05:49) (133/65 - 165/68)  BP(mean): --  ABP: --  ABP(mean): --  RR: 18 (15 Mar 2023 05:49) (17 - 18)  SpO2: 100% (15 Mar 2023 05:49) (100% - 100%)    O2 Parameters below as of 15 Mar 2023 05:49  Patient On (Oxygen Delivery Method): room air                              10.5   7.12  )-----------( 445      ( 14 Mar 2023 09:42 )             34.5   03-14    138  |  95<L>  |  39<H>  ----------------------------<  89  4.7   |  25  |  6.71<H>    Ca    8.8      14 Mar 2023 09:42        PHYSICAL EXAM:  Gen: NAD, alert and oriented  Resp: Unlabored breathing  Gen: awake, alert, NAD  Resp: no increased work of breathing  RUE  dressing c/d/i, dressing change done   + AIN/PIN/IO  C5-T1 SILT  compartments soft  radial pulse 2+           A/P:  Stable s/p Right hand and forearm irrigation and debridement, pending further operative I&D  - Pain control/ Analgesia  - DVT ppx A81, SQH  -PT/OT   -NWB RUE  -BID dressing changes  - FU vasc recs  - Plan to RTOR for repeat I&D, NPO/IVF after midnight  - AM preop labs

## 2023-03-15 NOTE — PROVIDER CONTACT NOTE (OTHER) - ACTION/TREATMENT ORDERED:
As per DONIS Lange, will continue to monitor BP throughout treatment and notify PA if BP stays high or if patient becomes symptomatic
MD notified.  MD, okay for pt to not be dialyzed today. HD to be attempted tomorrow morning. As per MD, no need for further intervention or assessment required by RN at this time.
Will come to speak to patient regarding dialysis
awaiting interventions.

## 2023-03-15 NOTE — PROGRESS NOTE ADULT - SUBJECTIVE AND OBJECTIVE BOX
Patient is a 47y Male whom presented to the hospital with esrd on hd    PAST MEDICAL & SURGICAL HISTORY:  ESRD on dialysis      H/O hypotension      Diabetes mellitus      S/P BKA (below knee amputation) bilateral      AV fistula          MEDICATIONS  (STANDING):  acetaminophen     Tablet .. 975 milliGRAM(s) Oral every 8 hours  ampicillin/sulbactam  IVPB      aspirin  chewable 81 milliGRAM(s) Oral daily  dextrose 5%. 1000 milliLiter(s) (50 mL/Hr) IV Continuous <Continuous>  dextrose 5%. 1000 milliLiter(s) (100 mL/Hr) IV Continuous <Continuous>  dextrose 50% Injectable 25 Gram(s) IV Push once  dextrose 50% Injectable 12.5 Gram(s) IV Push once  dextrose 50% Injectable 25 Gram(s) IV Push once  glucagon  Injectable 1 milliGRAM(s) IntraMuscular once  insulin lispro (ADMELOG) corrective regimen sliding scale   SubCutaneous three times a day before meals  lactated ringers. 1000 milliLiter(s) (100 mL/Hr) IV Continuous <Continuous>      Allergies    No Known Drug Allergies  [place] (Rash)    Intolerances        SOCIAL HISTORY:  Denies ETOh,Smoking,     FAMILY HISTORY:  No pertinent family history in first degree relatives        REVIEW OF SYSTEMS:    CONSTITUTIONAL: No weakness, fevers or chills                                                                  9.5    7.16  )-----------( 457      ( 15 Mar 2023 16:05 )             30.8       CBC Full  -  ( 15 Mar 2023 16:05 )  WBC Count : 7.16 K/uL  RBC Count : 3.54 M/uL  Hemoglobin : 9.5 g/dL  Hematocrit : 30.8 %  Platelet Count - Automated : 457 K/uL  Mean Cell Volume : 87.0 fL  Mean Cell Hemoglobin : 26.8 pg  Mean Cell Hemoglobin Concentration : 30.8 gm/dL  Auto Neutrophil # : x  Auto Lymphocyte # : x  Auto Monocyte # : x  Auto Eosinophil # : x  Auto Basophil # : x  Auto Neutrophil % : x  Auto Lymphocyte % : x  Auto Monocyte % : x  Auto Eosinophil % : x  Auto Basophil % : x      03-15    136  |  96<L>  |  59<H>  ----------------------------<  105<H>  5.9<H>   |  20<L>  |  8.97<H>    Ca    8.5      15 Mar 2023 16:05        CAPILLARY BLOOD GLUCOSE      POCT Blood Glucose.: 125 mg/dL (15 Mar 2023 19:21)  POCT Blood Glucose.: 97 mg/dL (15 Mar 2023 15:05)  POCT Blood Glucose.: 119 mg/dL (15 Mar 2023 11:09)  POCT Blood Glucose.: 160 mg/dL (15 Mar 2023 07:10)  POCT Blood Glucose.: 103 mg/dL (14 Mar 2023 21:46)      Vital Signs Last 24 Hrs  T(C): 36.6 (15 Mar 2023 15:55), Max: 36.6 (15 Mar 2023 15:55)  T(F): 97.9 (15 Mar 2023 15:55), Max: 97.9 (15 Mar 2023 15:55)  HR: 72 (15 Mar 2023 15:55) (69 - 78)  BP: 194/100 (15 Mar 2023 15:55) (133/65 - 194/100)  BP(mean): --  RR: 17 (15 Mar 2023 15:55) (17 - 18)  SpO2: 100% (15 Mar 2023 10:09) (100% - 100%)    Parameters below as of 15 Mar 2023 15:55  Patient On (Oxygen Delivery Method): room air            PT/INR - ( 15 Mar 2023 16:05 )   PT: 13.1 sec;   INR: 1.13 ratio         PTT - ( 15 Mar 2023 16:05 )  PTT:32.6 sec                PHYSICAL EXAM:    Constitutional: NAD  HEENT: conjunctive   clear   Neck:  No JVD  Respiratory: CTAB  Cardiovascular: S1 and S2  Gastrointestinal: BS+, soft, NT/ND  Extremities: No peripheral edema ,  Prior right 3rd digit amputation.S/P BKA (below knee amputation) bilateral  Access: pos fistula

## 2023-03-15 NOTE — PROVIDER CONTACT NOTE (OTHER) - BACKGROUND
Patient has pmh of ESRD
Pt admitted for infection of right hand, hx of T2DM and ESRD.
Admitted for infection of right hand
Patient is a hard stick. Surgery team contacted regarding IV access and ability to give IV antibiotics. Patient noted to be dialysis patient and right arm precautions. Positive blood return noted.

## 2023-03-15 NOTE — PROVIDER CONTACT NOTE (OTHER) - ASSESSMENT
Patient scheduled to have dialysis at bedside today but refusing to receive it if it is scheduled after 11AM. Dialysis called and was unable to provide time of session and said they would call back once they spoke to nephrologist, Dr. Chavarria
Pt sitting comfortably in bed. This RN approached pt for today's HD session. Pt refused for today and stated he would prefer HD tomorrow morning.
#22g IV access not adequate enough
Patient is asymptomatic and denies chest pain. pt is in bed comfortable

## 2023-03-15 NOTE — PROGRESS NOTE ADULT - SUBJECTIVE AND OBJECTIVE BOX
CHIEF COMPLAINT: f/u right forearm gas gangrene    SUBJECTIVE / OVERNIGHT EVENTS: Patient seen and examined. No acute events overnight. Pain well controlled and patient without any complaints.    MEDICATIONS  (STANDING):  acetaminophen     Tablet .. 975 milliGRAM(s) Oral every 8 hours  ampicillin/sulbactam  IVPB      ampicillin/sulbactam  IVPB 3 Gram(s) IV Intermittent every 24 hours  aspirin  chewable 81 milliGRAM(s) Oral daily  collagenase Ointment 1 Application(s) Topical two times a day  dextrose 5%. 1000 milliLiter(s) (50 mL/Hr) IV Continuous <Continuous>  dextrose 5%. 1000 milliLiter(s) (100 mL/Hr) IV Continuous <Continuous>  dextrose 50% Injectable 25 Gram(s) IV Push once  dextrose 50% Injectable 12.5 Gram(s) IV Push once  dextrose 50% Injectable 25 Gram(s) IV Push once  dorzolamide 2%/timolol 0.5% Ophthalmic Solution 1 Drop(s) Both EYES two times a day  glucagon  Injectable 1 milliGRAM(s) IntraMuscular once  heparin   Injectable 5000 Unit(s) SubCutaneous every 12 hours  insulin glargine Injectable (LANTUS) 7 Unit(s) SubCutaneous at bedtime  insulin lispro (ADMELOG) corrective regimen sliding scale   SubCutaneous at bedtime  insulin lispro (ADMELOG) corrective regimen sliding scale   SubCutaneous three times a day before meals  insulin lispro Injectable (ADMELOG) 2 Unit(s) SubCutaneous three times a day before meals  senna 2 Tablet(s) Oral at bedtime    MEDICATIONS  (PRN):  dextrose Oral Gel 15 Gram(s) Oral once PRN Blood Glucose LESS THAN 70 milliGRAM(s)/deciliter      VITALS:  T(F): 97.6 (03-15-23 @ 10:09), Max: 97.9 (03-14-23 @ 17:57)  HR: 73 (03-15-23 @ 10:09) (69 - 78)  BP: 135/74 (03-15-23 @ 10:09) (133/65 - 165/68)  RR: 18 (03-15-23 @ 10:09) (17 - 18)  SpO2: 100% (03-15-23 @ 10:09)      PHYSICAL EXAM:  GENERAL: NAD, well-developed  CHEST/LUNG: Clear to auscultation bilaterally; No wheeze  HEART: Regular rate and rhythm; No murmurs, rubs, or gallops  ABDOMEN: Soft, Nontender, Nondistended; Bowel sounds present  EXTREMITIES:  B/L LE amputations +BKA  SKIN: Right hand dressing C/D/I    LABS:              10.5                 138  | 25   | 39           7.12  >-----------< 445     ------------------------< 89                    34.5                 4.7  | 95   | 6.71                                         Ca 8.8   Mg x     Ph x        CAPILLARY BLOOD GLUCOSE  POCT Blood Glucose.: 119 mg/dL (15 Mar 2023 11:09)  POCT Blood Glucose.: 160 mg/dL (15 Mar 2023 07:10)  POCT Blood Glucose.: 103 mg/dL (14 Mar 2023 21:46)  POCT Blood Glucose.: 140 mg/dL (14 Mar 2023 16:38)    [ ] Consultant(s) Notes Reviewed:  [x] Care Discussed with Consultants/Other Providers: Orthopedic PA - discussed plan for repeat MRI Hand

## 2023-03-15 NOTE — PROGRESS NOTE ADULT - PROBLEM SELECTOR PLAN 6
-diabetic retinopathy.  severe proliferative diabetic retinopathy, previously on cosopt, optho outpt follow up   -JeO5b=3.5%. FS better controlled in-house.  -c/w ISS for now and continue to monitor FS closely.  -patient on 4 units premeals and 5 units at bedtime  -c/w lantus to 7units subq at bedtime and admelog 2units qAC before meals  -will titrate up as necessary  -c/w ASA daily -diabetic retinopathy.  severe proliferative diabetic retinopathy, previously on cosopt, optho outpt follow up   -VrF7l=1.5%. FS better controlled in-house.  -c/w ISS for now and continue to monitor FS closely.  -patient on 4 units premeals and 5 units at bedtime  -c/w lantus to 7units subq at bedtime and admelog 2units qAC before meals  -will titrate up as necessary  -c/w ASA daily  -since patient is NPO for OR in am, will decrease lantus to 3units at bedtime tonight and stop pre-meals in am

## 2023-03-16 LAB
ANION GAP SERPL CALC-SCNC: 18 MMOL/L — HIGH (ref 7–14)
APTT BLD: 25.5 SEC — LOW (ref 27–36.3)
BUN SERPL-MCNC: 29 MG/DL — HIGH (ref 7–23)
CALCIUM SERPL-MCNC: 8.7 MG/DL — SIGNIFICANT CHANGE UP (ref 8.4–10.5)
CHLORIDE SERPL-SCNC: 95 MMOL/L — LOW (ref 98–107)
CO2 SERPL-SCNC: 22 MMOL/L — SIGNIFICANT CHANGE UP (ref 22–31)
CREAT SERPL-MCNC: 5.96 MG/DL — HIGH (ref 0.5–1.3)
EGFR: 11 ML/MIN/1.73M2 — LOW
GLUCOSE BLDC GLUCOMTR-MCNC: 108 MG/DL — HIGH (ref 70–99)
GLUCOSE BLDC GLUCOMTR-MCNC: 115 MG/DL — HIGH (ref 70–99)
GLUCOSE BLDC GLUCOMTR-MCNC: 131 MG/DL — HIGH (ref 70–99)
GLUCOSE BLDC GLUCOMTR-MCNC: 157 MG/DL — HIGH (ref 70–99)
GLUCOSE BLDC GLUCOMTR-MCNC: 173 MG/DL — HIGH (ref 70–99)
GLUCOSE BLDC GLUCOMTR-MCNC: 194 MG/DL — HIGH (ref 70–99)
GLUCOSE SERPL-MCNC: 202 MG/DL — HIGH (ref 70–99)
HCT VFR BLD CALC: 32.9 % — LOW (ref 39–50)
HGB BLD-MCNC: 10.1 G/DL — LOW (ref 13–17)
INR BLD: 1.12 RATIO — SIGNIFICANT CHANGE UP (ref 0.88–1.16)
MAGNESIUM SERPL-MCNC: 2.3 MG/DL — SIGNIFICANT CHANGE UP (ref 1.6–2.6)
MCHC RBC-ENTMCNC: 26.4 PG — LOW (ref 27–34)
MCHC RBC-ENTMCNC: 30.7 GM/DL — LOW (ref 32–36)
MCV RBC AUTO: 86.1 FL — SIGNIFICANT CHANGE UP (ref 80–100)
NRBC # BLD: 0 /100 WBCS — SIGNIFICANT CHANGE UP (ref 0–0)
NRBC # FLD: 0 K/UL — SIGNIFICANT CHANGE UP (ref 0–0)
PHOSPHATE SERPL-MCNC: 5.5 MG/DL — HIGH (ref 2.5–4.5)
PLATELET # BLD AUTO: 445 K/UL — HIGH (ref 150–400)
POTASSIUM SERPL-MCNC: 4.1 MMOL/L — SIGNIFICANT CHANGE UP (ref 3.5–5.3)
POTASSIUM SERPL-SCNC: 4.1 MMOL/L — SIGNIFICANT CHANGE UP (ref 3.5–5.3)
PROTHROM AB SERPL-ACNC: 13 SEC — SIGNIFICANT CHANGE UP (ref 10.5–13.4)
RBC # BLD: 3.82 M/UL — LOW (ref 4.2–5.8)
RBC # FLD: 15.4 % — HIGH (ref 10.3–14.5)
SODIUM SERPL-SCNC: 135 MMOL/L — SIGNIFICANT CHANGE UP (ref 135–145)
WBC # BLD: 6.52 K/UL — SIGNIFICANT CHANGE UP (ref 3.8–10.5)
WBC # FLD AUTO: 6.52 K/UL — SIGNIFICANT CHANGE UP (ref 3.8–10.5)

## 2023-03-16 PROCEDURE — 15275 SKIN SUB GRAFT FACE/NK/HF/G: CPT | Mod: 58,RT

## 2023-03-16 PROCEDURE — 73220 MRI UPPR EXTREMITY W/O&W/DYE: CPT | Mod: 26,RT

## 2023-03-16 PROCEDURE — 15271 SKIN SUB GRAFT TRNK/ARM/LEG: CPT | Mod: 58,RT

## 2023-03-16 DEVICE — INTEGRA WOUND MATRIX MESHED BILAYER 2X2"
Type: IMPLANTABLE DEVICE | Status: NON-FUNCTIONAL
Removed: 2023-03-16

## 2023-03-16 RX ORDER — ACETAMINOPHEN 500 MG
875 TABLET ORAL EVERY 8 HOURS
Refills: 0 | Status: DISCONTINUED | OUTPATIENT
Start: 2023-03-16 | End: 2023-03-17

## 2023-03-16 RX ORDER — OXYCODONE HYDROCHLORIDE 5 MG/1
10 TABLET ORAL EVERY 4 HOURS
Refills: 0 | Status: DISCONTINUED | OUTPATIENT
Start: 2023-03-16 | End: 2023-03-23

## 2023-03-16 RX ORDER — ONDANSETRON 8 MG/1
4 TABLET, FILM COATED ORAL ONCE
Refills: 0 | Status: DISCONTINUED | OUTPATIENT
Start: 2023-03-16 | End: 2023-03-16

## 2023-03-16 RX ORDER — OXYCODONE HYDROCHLORIDE 5 MG/1
5 TABLET ORAL EVERY 4 HOURS
Refills: 0 | Status: DISCONTINUED | OUTPATIENT
Start: 2023-03-16 | End: 2023-03-16

## 2023-03-16 RX ORDER — FENTANYL CITRATE 50 UG/ML
25 INJECTION INTRAVENOUS
Refills: 0 | Status: DISCONTINUED | OUTPATIENT
Start: 2023-03-16 | End: 2023-03-16

## 2023-03-16 RX ORDER — INSULIN GLARGINE 100 [IU]/ML
3 INJECTION, SOLUTION SUBCUTANEOUS AT BEDTIME
Refills: 0 | Status: DISCONTINUED | OUTPATIENT
Start: 2023-03-17 | End: 2023-03-17

## 2023-03-16 RX ORDER — INSULIN GLARGINE 100 [IU]/ML
3 INJECTION, SOLUTION SUBCUTANEOUS ONCE
Refills: 0 | Status: COMPLETED | OUTPATIENT
Start: 2023-03-16 | End: 2023-03-16

## 2023-03-16 RX ADMIN — AMPICILLIN SODIUM AND SULBACTAM SODIUM 200 GRAM(S): 250; 125 INJECTION, POWDER, FOR SUSPENSION INTRAMUSCULAR; INTRAVENOUS at 11:14

## 2023-03-16 RX ADMIN — DORZOLAMIDE HYDROCHLORIDE TIMOLOL MALEATE 1 DROP(S): 20; 5 SOLUTION/ DROPS OPHTHALMIC at 06:14

## 2023-03-16 RX ADMIN — Medication 1 APPLICATION(S): at 06:13

## 2023-03-16 RX ADMIN — Medication 2: at 07:22

## 2023-03-16 RX ADMIN — Medication 975 MILLIGRAM(S): at 01:30

## 2023-03-16 RX ADMIN — SODIUM ZIRCONIUM CYCLOSILICATE 10 GRAM(S): 10 POWDER, FOR SUSPENSION ORAL at 06:15

## 2023-03-16 RX ADMIN — Medication 2: at 11:41

## 2023-03-16 RX ADMIN — DORZOLAMIDE HYDROCHLORIDE TIMOLOL MALEATE 1 DROP(S): 20; 5 SOLUTION/ DROPS OPHTHALMIC at 21:53

## 2023-03-16 RX ADMIN — OXYCODONE HYDROCHLORIDE 5 MILLIGRAM(S): 5 TABLET ORAL at 21:55

## 2023-03-16 RX ADMIN — Medication 975 MILLIGRAM(S): at 00:32

## 2023-03-16 RX ADMIN — OXYCODONE HYDROCHLORIDE 5 MILLIGRAM(S): 5 TABLET ORAL at 22:50

## 2023-03-16 NOTE — PROGRESS NOTE ADULT - SUBJECTIVE AND OBJECTIVE BOX
Patient is a 47y Male whom presented to the hospital with esrd on hd    PAST MEDICAL & SURGICAL HISTORY:  ESRD on dialysis      H/O hypotension      Diabetes mellitus      S/P BKA (below knee amputation) bilateral      AV fistula          MEDICATIONS  (STANDING):  acetaminophen     Tablet .. 975 milliGRAM(s) Oral every 8 hours  ampicillin/sulbactam  IVPB      aspirin  chewable 81 milliGRAM(s) Oral daily  dextrose 5%. 1000 milliLiter(s) (50 mL/Hr) IV Continuous <Continuous>  dextrose 5%. 1000 milliLiter(s) (100 mL/Hr) IV Continuous <Continuous>  dextrose 50% Injectable 25 Gram(s) IV Push once  dextrose 50% Injectable 12.5 Gram(s) IV Push once  dextrose 50% Injectable 25 Gram(s) IV Push once  glucagon  Injectable 1 milliGRAM(s) IntraMuscular once  insulin lispro (ADMELOG) corrective regimen sliding scale   SubCutaneous three times a day before meals  lactated ringers. 1000 milliLiter(s) (100 mL/Hr) IV Continuous <Continuous>      Allergies    No Known Drug Allergies  [place] (Rash)    Intolerances        SOCIAL HISTORY:  Denies ETOh,Smoking,     FAMILY HISTORY:  No pertinent family history in first degree relatives        REVIEW OF SYSTEMS:    CONSTITUTIONAL: No weakness, fevers or chills                                                                                  10.1   6.52  )-----------( 445      ( 16 Mar 2023 02:30 )             32.9       CBC Full  -  ( 16 Mar 2023 02:30 )  WBC Count : 6.52 K/uL  RBC Count : 3.82 M/uL  Hemoglobin : 10.1 g/dL  Hematocrit : 32.9 %  Platelet Count - Automated : 445 K/uL  Mean Cell Volume : 86.1 fL  Mean Cell Hemoglobin : 26.4 pg  Mean Cell Hemoglobin Concentration : 30.7 gm/dL  Auto Neutrophil # : x  Auto Lymphocyte # : x  Auto Monocyte # : x  Auto Eosinophil # : x  Auto Basophil # : x  Auto Neutrophil % : x  Auto Lymphocyte % : x  Auto Monocyte % : x  Auto Eosinophil % : x  Auto Basophil % : x      03-16    135  |  95<L>  |  29<H>  ----------------------------<  202<H>  4.1   |  22  |  5.96<H>    Ca    8.7      16 Mar 2023 01:42  Phos  5.5     03-16  Mg     2.30     03-16        CAPILLARY BLOOD GLUCOSE      POCT Blood Glucose.: 115 mg/dL (16 Mar 2023 18:06)  POCT Blood Glucose.: 131 mg/dL (16 Mar 2023 17:44)  POCT Blood Glucose.: 108 mg/dL (16 Mar 2023 16:28)  POCT Blood Glucose.: 157 mg/dL (16 Mar 2023 11:23)  POCT Blood Glucose.: 194 mg/dL (16 Mar 2023 07:07)  POCT Blood Glucose.: 162 mg/dL (15 Mar 2023 20:28)      Vital Signs Last 24 Hrs  T(C): 36.8 (16 Mar 2023 18:24), Max: 37.1 (16 Mar 2023 01:38)  T(F): 98.2 (16 Mar 2023 18:24), Max: 98.8 (16 Mar 2023 09:00)  HR: 82 (16 Mar 2023 18:24) (70 - 82)  BP: 135/74 (16 Mar 2023 18:24) (107/55 - 161/77)  BP(mean): 94 (16 Mar 2023 17:30) (88 - 97)  RR: 17 (16 Mar 2023 18:24) (12 - 18)  SpO2: 97% (16 Mar 2023 18:24) (95% - 100%)    Parameters below as of 16 Mar 2023 18:24  Patient On (Oxygen Delivery Method): room air            PT/INR - ( 16 Mar 2023 01:42 )   PT: 13.0 sec;   INR: 1.12 ratio         PTT - ( 16 Mar 2023 01:42 )  PTT:25.5 sec          PHYSICAL EXAM:    Constitutional: NAD  HEENT: conjunctive   clear   Neck:  No JVD  Respiratory: CTAB  Cardiovascular: S1 and S2  Gastrointestinal: BS+, soft, NT/ND  Extremities: No peripheral edema ,  Prior right 3rd digit amputation.S/P BKA (below knee amputation) bilateral  Access: pos fistula

## 2023-03-16 NOTE — PROGRESS NOTE ADULT - ASSESSMENT
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 Sin/Dr. Singh) who was seen by Dr. Sin today in the office and sent in to [facility] for urgent irrigation and debridement. Patient states he missed dialysis today because he was instructed to come straight to the ED.     PAST MEDICAL & SURGICAL HISTORY:  ESRD on dialysis  H/O hypotension  Diabetes mellitus  S/P BKA (below knee amputation) bilateral  AV fistula (06 Mar 2023 17:32)            esrd on hd   will plan for hd as order    Excess fluids and waste products will be removed from your blood; your electrolytes will be balanced; your blood pressure will be controlled.      ANEMIA PLAN:  Anemia of chronic disease:  Well controlled by Aranesp  H and H subtherapeutic .  We will continue Aranesp aiming for a HCT of 32-36 %.   We will monitor Iron stores, B12 and RBC folate .      ,send blood and urine cx,serial lactate levels,monitor vitals closley,ivfs hydration,monitor urine output and renal profile,iv abx

## 2023-03-16 NOTE — PROGRESS NOTE ADULT - SUBJECTIVE AND OBJECTIVE BOX
POST-OPERATIVE NOTE    Subjective:  Patient is s/p R hand repeat I and D and vac change with dermal substitute. Recovering appropriately.     Vital Signs Last 24 Hrs  T(C): 36.8 (16 Mar 2023 18:24), Max: 37.1 (16 Mar 2023 01:38)  T(F): 98.2 (16 Mar 2023 18:24), Max: 98.8 (16 Mar 2023 09:00)  HR: 82 (16 Mar 2023 18:24) (70 - 82)  BP: 135/74 (16 Mar 2023 18:24) (107/55 - 161/77)  BP(mean): 94 (16 Mar 2023 17:30) (88 - 97)  RR: 17 (16 Mar 2023 18:24) (12 - 18)  SpO2: 97% (16 Mar 2023 18:24) (95% - 100%)    Parameters below as of 16 Mar 2023 18:24  Patient On (Oxygen Delivery Method): room air      I&O's Detail    15 Mar 2023 07:01  -  16 Mar 2023 07:00  --------------------------------------------------------  IN:    Other (mL): 400 mL  Total IN: 400 mL    OUT:    Other (mL): 1900 mL  Total OUT: 1900 mL    Total NET: -1500 mL        ampicillin/sulbactam  IVPB   ampicillin/sulbactam  IVPB 3  ampicillin/sulbactam  IVPB   ampicillin/sulbactam  IVPB 3  aspirin  chewable 81    PAST MEDICAL & SURGICAL HISTORY:  ESRD on dialysis      H/O hypotension      Diabetes mellitus      S/P BKA (below knee amputation) bilateral      AV fistula    PHYSICAL EXAM:  Gen: NAD, alert and oriented  Resp: Unlabored breathing  Gen: awake, alert, NAD  Resp: no increased work of breathing  RUE  dressing c/d/i. Wound vac in place holding suction  + AIN/PIN/IO  C5-T1 SILT  compartments soft  radial pulse 2+       LABS:                        10.1   6.52  )-----------( 445      ( 16 Mar 2023 02:30 )             32.9     03-16    135  |  95<L>  |  29<H>  ----------------------------<  202<H>  4.1   |  22  |  5.96<H>    Ca    8.7      16 Mar 2023 01:42  Phos  5.5     03-16  Mg     2.30     03-16      PT/INR - ( 16 Mar 2023 01:42 )   PT: 13.0 sec;   INR: 1.12 ratio         PTT - ( 16 Mar 2023 01:42 )  PTT:25.5 sec  CAPILLARY BLOOD GLUCOSE      POCT Blood Glucose.: 115 mg/dL (16 Mar 2023 18:06)  POCT Blood Glucose.: 131 mg/dL (16 Mar 2023 17:44)  POCT Blood Glucose.: 108 mg/dL (16 Mar 2023 16:28)  POCT Blood Glucose.: 157 mg/dL (16 Mar 2023 11:23)  POCT Blood Glucose.: 194 mg/dL (16 Mar 2023 07:07)  POCT Blood Glucose.: 162 mg/dL (15 Mar 2023 20:28)  POCT Blood Glucose.: 125 mg/dL (15 Mar 2023 19:21)      A/P:  47M Stable s/p repeat Right hand and forearm irrigation and debridement 3/16 w vac placement.     Plan:  - Pain control as needed  - DVT ppx A81 and SQH  - PT/OT  - Plan for bedside VAC change Monday  - F/u AM labs

## 2023-03-16 NOTE — PROGRESS NOTE ADULT - SUBJECTIVE AND OBJECTIVE BOX
Patient seen and examined at bedside. Reports no acute complaints at this time. Pain is well controlled. No acute events overnight.    ICU Vital Signs Last 24 Hrs  T(C): 36.7 (16 Mar 2023 06:25), Max: 37.1 (16 Mar 2023 01:38)  T(F): 98.1 (16 Mar 2023 06:25), Max: 98.7 (16 Mar 2023 01:38)  HR: 70 (16 Mar 2023 06:25) (70 - 80)  BP: 107/55 (16 Mar 2023 06:25) (107/55 - 194/100)  BP(mean): --  ABP: --  ABP(mean): --  RR: 18 (16 Mar 2023 06:25) (16 - 18)  SpO2: 100% (16 Mar 2023 06:25) (100% - 100%)    O2 Parameters below as of 16 Mar 2023 06:25  Patient On (Oxygen Delivery Method): room air                              10.1   6.52  )-----------( 445      ( 16 Mar 2023 02:30 )             32.9   03-16    135  |  95<L>  |  29<H>  ----------------------------<  202<H>  4.1   |  22  |  5.96<H>    Ca    8.7      16 Mar 2023 01:42  Phos  5.5     03-16  Mg     2.30     03-16        PHYSICAL EXAM:  Gen: NAD, alert and oriented  Resp: Unlabored breathing  Gen: awake, alert, NAD  Resp: no increased work of breathing  RUE  dressing c/d/i, dressing change done   + AIN/PIN/IO  C5-T1 SILT  compartments soft  radial pulse 2+       A/P:  Stable s/p Right hand and forearm irrigation and debridement, pending further operative I&D  - Pain control/ Analgesia  - DVT ppx A81, hold SQH for OR  - PT/OT   - NWB RUE  - BID dressing changes  - FU vasc recs  - Plan for OR today, NPO/IVF

## 2023-03-17 LAB
ANION GAP SERPL CALC-SCNC: 21 MMOL/L — HIGH (ref 7–14)
BLD GP AB SCN SERPL QL: POSITIVE — SIGNIFICANT CHANGE UP
BUN SERPL-MCNC: 56 MG/DL — HIGH (ref 7–23)
CALCIUM SERPL-MCNC: 8.6 MG/DL — SIGNIFICANT CHANGE UP (ref 8.4–10.5)
CHLORIDE SERPL-SCNC: 94 MMOL/L — LOW (ref 98–107)
CO2 SERPL-SCNC: 22 MMOL/L — SIGNIFICANT CHANGE UP (ref 22–31)
CREAT SERPL-MCNC: 9 MG/DL — HIGH (ref 0.5–1.3)
EGFR: 7 ML/MIN/1.73M2 — LOW
GLUCOSE BLDC GLUCOMTR-MCNC: 107 MG/DL — HIGH (ref 70–99)
GLUCOSE BLDC GLUCOMTR-MCNC: 167 MG/DL — HIGH (ref 70–99)
GLUCOSE BLDC GLUCOMTR-MCNC: 203 MG/DL — HIGH (ref 70–99)
GLUCOSE BLDC GLUCOMTR-MCNC: 204 MG/DL — HIGH (ref 70–99)
GLUCOSE BLDC GLUCOMTR-MCNC: 209 MG/DL — HIGH (ref 70–99)
GLUCOSE SERPL-MCNC: 193 MG/DL — HIGH (ref 70–99)
HCT VFR BLD CALC: 29.2 % — LOW (ref 39–50)
HGB BLD-MCNC: 9 G/DL — LOW (ref 13–17)
MCHC RBC-ENTMCNC: 26.6 PG — LOW (ref 27–34)
MCHC RBC-ENTMCNC: 30.8 GM/DL — LOW (ref 32–36)
MCV RBC AUTO: 86.4 FL — SIGNIFICANT CHANGE UP (ref 80–100)
NRBC # BLD: 0 /100 WBCS — SIGNIFICANT CHANGE UP (ref 0–0)
NRBC # FLD: 0 K/UL — SIGNIFICANT CHANGE UP (ref 0–0)
PLATELET # BLD AUTO: 432 K/UL — HIGH (ref 150–400)
POTASSIUM SERPL-MCNC: 5.8 MMOL/L — HIGH (ref 3.5–5.3)
POTASSIUM SERPL-SCNC: 5.8 MMOL/L — HIGH (ref 3.5–5.3)
RBC # BLD: 3.38 M/UL — LOW (ref 4.2–5.8)
RBC # FLD: 15.6 % — HIGH (ref 10.3–14.5)
RH IG SCN BLD-IMP: POSITIVE — SIGNIFICANT CHANGE UP
SODIUM SERPL-SCNC: 137 MMOL/L — SIGNIFICANT CHANGE UP (ref 135–145)
WBC # BLD: 8.41 K/UL — SIGNIFICANT CHANGE UP (ref 3.8–10.5)
WBC # FLD AUTO: 8.41 K/UL — SIGNIFICANT CHANGE UP (ref 3.8–10.5)

## 2023-03-17 PROCEDURE — 99232 SBSQ HOSP IP/OBS MODERATE 35: CPT

## 2023-03-17 PROCEDURE — 99233 SBSQ HOSP IP/OBS HIGH 50: CPT

## 2023-03-17 RX ORDER — INSULIN GLARGINE 100 [IU]/ML
4 INJECTION, SOLUTION SUBCUTANEOUS AT BEDTIME
Refills: 0 | Status: DISCONTINUED | OUTPATIENT
Start: 2023-03-17 | End: 2023-04-14

## 2023-03-17 RX ADMIN — Medication 975 MILLIGRAM(S): at 07:02

## 2023-03-17 RX ADMIN — DORZOLAMIDE HYDROCHLORIDE TIMOLOL MALEATE 1 DROP(S): 20; 5 SOLUTION/ DROPS OPHTHALMIC at 21:27

## 2023-03-17 RX ADMIN — OXYCODONE HYDROCHLORIDE 10 MILLIGRAM(S): 5 TABLET ORAL at 22:25

## 2023-03-17 RX ADMIN — OXYCODONE HYDROCHLORIDE 10 MILLIGRAM(S): 5 TABLET ORAL at 07:02

## 2023-03-17 RX ADMIN — OXYCODONE HYDROCHLORIDE 10 MILLIGRAM(S): 5 TABLET ORAL at 21:26

## 2023-03-17 RX ADMIN — Medication 1 APPLICATION(S): at 21:48

## 2023-03-17 RX ADMIN — Medication 975 MILLIGRAM(S): at 01:50

## 2023-03-17 RX ADMIN — Medication 975 MILLIGRAM(S): at 21:25

## 2023-03-17 RX ADMIN — Medication 2: at 07:20

## 2023-03-17 RX ADMIN — Medication 81 MILLIGRAM(S): at 12:20

## 2023-03-17 RX ADMIN — AMPICILLIN SODIUM AND SULBACTAM SODIUM 200 GRAM(S): 250; 125 INJECTION, POWDER, FOR SUSPENSION INTRAMUSCULAR; INTRAVENOUS at 10:50

## 2023-03-17 RX ADMIN — DORZOLAMIDE HYDROCHLORIDE TIMOLOL MALEATE 1 DROP(S): 20; 5 SOLUTION/ DROPS OPHTHALMIC at 06:09

## 2023-03-17 RX ADMIN — Medication 975 MILLIGRAM(S): at 22:25

## 2023-03-17 RX ADMIN — Medication 1 APPLICATION(S): at 06:09

## 2023-03-17 RX ADMIN — CHLORHEXIDINE GLUCONATE 1 APPLICATION(S): 213 SOLUTION TOPICAL at 12:20

## 2023-03-17 RX ADMIN — Medication 975 MILLIGRAM(S): at 08:00

## 2023-03-17 RX ADMIN — OXYCODONE HYDROCHLORIDE 10 MILLIGRAM(S): 5 TABLET ORAL at 08:00

## 2023-03-17 RX ADMIN — Medication 975 MILLIGRAM(S): at 00:53

## 2023-03-17 RX ADMIN — INSULIN GLARGINE 3 UNIT(S): 100 INJECTION, SOLUTION SUBCUTANEOUS at 00:52

## 2023-03-17 RX ADMIN — INSULIN GLARGINE 4 UNIT(S): 100 INJECTION, SOLUTION SUBCUTANEOUS at 21:49

## 2023-03-17 RX ADMIN — Medication 4: at 16:36

## 2023-03-17 NOTE — PROGRESS NOTE ADULT - ASSESSMENT
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 and multiple OR debridement with eventual closure, cultures with Ecoli pansensitive,  Strep angionosis and Bacteriodes fragilis, complicated Unasyn 6 week course while inpatient, now sent back by ortho for debridement as the hand was gangrenous and infected as per ortho afebrile, WBC normal, ESR: 44, CRP 23  xray  with areas of bone loss  s/p debridement 3/7 and no cultures sent  also COVID positive and CXR clear    ESRD with recent steal syndrome and R 3rd finger and forearm gas gangrene s/p amp and multiple debridement, completed a 6 week course of unasyn but after completing sent back for infected hand  s/p OR debridement 3/7 but no cultures sent   blood cx negative, MRSA PCR negative  MRI 3/13 after 7 days of antibiotics and debridement showed a 2.6 cm abscess and additional subcentimeter phlegmonous foci but no periostitis or osteo          VA duplex s/o steal syndrome  s/p OR debridement 3/16 but found to have hematoma and o cultures sent  Asymptomatic COVID CXR clear s/p 3 days of remdesivir    * c/w unasyn, debridement was 3/7 so day 11 but last OR was 3/16 so day 2  * will complete a 7 day post op through 3/22  * f/u with vascular   * will sign off, please call with questions      The above assessment and plan was discussed with the primary team    Tori Chamorro MD  contact on teams  After 5pm and on weekends call [phone]

## 2023-03-17 NOTE — PROGRESS NOTE ADULT - ASSESSMENT
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 Vascular Surgery consulted to r/o steal syndrome, no intervention indicated per their rec's - awaiting hand MRI [ ] and plan for further I&D in am.

## 2023-03-17 NOTE — PROGRESS NOTE ADULT - PROBLEM SELECTOR PLAN 2
-recurrent infections requiring irrigations and debridements.   -s/p urgent irrigation and debridement as above  -ID on board and following:     --f/u BCx (3/6) - NGT    --no OR Cx sent    --MRSA PCR negative   -c/w Unasyn; can d/c vanco per ID  - repeat MRI of hand on 3/16 with evidence of osteomyelitis, spoke with ortho PA and will reconsult ID to evaluate patient and determine abx course and regimen   - pain control with oxycodone IR prn and tylenol tid  -rest of care as per orthopedic, ID and vascular teams  -per vascular team, "AVF duplex reviewed and wounds improving....No acute vascular surgery intervention."  - patient to f/u with Dr. Palmer at discharge   s/p OR  on Thursday per orthopedic team - f/u their recs

## 2023-03-17 NOTE — PROGRESS NOTE ADULT - ASSESSMENT
This is a 48yo [place] speaking male with PMH of DM2, b/l BKA, ESRD (on HD MWF) well known to Orthopedics for treatment of right third finger 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 Sin/Dr. Singh) who was seen by Dr. Sin today in the office and sent in to [facility] for urgent irrigation and debridement. Patient states he missed dialysis today because he was instructed to come straight to the ED.     PAST MEDICAL & SURGICAL HISTORY:  ESRD on dialysis  H/O hypotension  Diabetes mellitus  S/P BKA (below knee amputation) bilateral  AV fistula (06 Mar 2023 17:32)            esrd on hd   will plan for hd as order    Excess fluids and waste products will be removed from your blood; your electrolytes will be balanced; your blood pressure will be controlled.      ANEMIA PLAN:  Anemia of chronic disease:  Well controlled by Aranesp  H and H subtherapeutic .  We will continue Aranesp aiming for a HCT of 32-36 %.   We will monitor Iron stores, B12 and RBC folate .      ,send blood and urine cx,serial lactate levels,monitor vitals closley,ivfs hydration,monitor urine output and renal profile,iv abx

## 2023-03-17 NOTE — PROGRESS NOTE ADULT - SUBJECTIVE AND OBJECTIVE BOX
ORTHO PROGRESS NOTE     Patient resting comfortable without complaint S/P repeat I and D right upper extremity yesterday.       Vital Signs Last 24 Hrs  T(C): 36.7 (17 Mar 2023 06:13), Max: 37.1 (16 Mar 2023 09:00)  T(F): 98.1 (17 Mar 2023 06:13), Max: 98.8 (16 Mar 2023 09:00)  HR: 72 (17 Mar 2023 06:13) (69 - 82)  BP: 114/58 (17 Mar 2023 06:13) (107/55 - 161/77)  BP(mean): 94 (16 Mar 2023 17:30) (88 - 97)  RR: 18 (17 Mar 2023 06:13) (12 - 18)  SpO2: 100% (17 Mar 2023 06:13) (95% - 100%)    Parameters below as of 17 Mar 2023 06:13  Patient On (Oxygen Delivery Method): room air          Right upper extremity : Dressing clean/dry/intact                       A/P:  Stable             DVT prophylaxis- ASA daily/ SQH             Follow up AM labs - HD            Pain control             Incentive spirometer

## 2023-03-17 NOTE — PROGRESS NOTE ADULT - SUBJECTIVE AND OBJECTIVE BOX
Follow Up:  hand gangrene, steal syndrome    Interval History/ROS: s/p forearm I&D yesterday but no cultures sent, no fever, SOB, vomiting      Allergies  No Known Drug Allergies  [place] (Rash)        ANTIMICROBIALS:  ampicillin/sulbactam  IVPB    ampicillin/sulbactam  IVPB 3 every 24 hours      OTHER MEDS:  acetaminophen     Tablet .. 975 milliGRAM(s) Oral every 8 hours  aspirin  chewable 81 milliGRAM(s) Oral daily  chlorhexidine 2% Cloths 1 Application(s) Topical daily  collagenase Ointment 1 Application(s) Topical two times a day  dextrose 5%. 1000 milliLiter(s) IV Continuous <Continuous>  dextrose 5%. 1000 milliLiter(s) IV Continuous <Continuous>  dextrose 50% Injectable 25 Gram(s) IV Push once  dextrose 50% Injectable 12.5 Gram(s) IV Push once  dextrose 50% Injectable 25 Gram(s) IV Push once  dextrose Oral Gel 15 Gram(s) Oral once PRN  dorzolamide 2%/timolol 0.5% Ophthalmic Solution 1 Drop(s) Both EYES two times a day  glucagon  Injectable 1 milliGRAM(s) IntraMuscular once  insulin glargine Injectable (LANTUS) 4 Unit(s) SubCutaneous at bedtime  insulin lispro (ADMELOG) corrective regimen sliding scale   SubCutaneous at bedtime  insulin lispro (ADMELOG) corrective regimen sliding scale   SubCutaneous three times a day before meals  oxyCODONE    IR 5 milliGRAM(s) Oral every 4 hours PRN  oxyCODONE    IR 10 milliGRAM(s) Oral every 4 hours PRN  senna 2 Tablet(s) Oral at bedtime      Vital Signs Last 24 Hrs  T(C): 37.2 (17 Mar 2023 13:00), Max: 37.2 (17 Mar 2023 13:00)  T(F): 99 (17 Mar 2023 13:00), Max: 99 (17 Mar 2023 13:00)  HR: 74 (17 Mar 2023 13:00) (69 - 82)  BP: 114/52 (17 Mar 2023 13:00) (114/52 - 161/77)  BP(mean): 94 (16 Mar 2023 17:30) (88 - 97)  RR: 17 (17 Mar 2023 13:00) (12 - 18)  SpO2: 95% (17 Mar 2023 13:00) (95% - 100%)    Parameters below as of 17 Mar 2023 13:00  Patient On (Oxygen Delivery Method): room air        Physical Exam:  General:    NAD,  non toxic  Respiratory:    comfortable on RA  abd:     soft,      no tenderness  :   no CVAT,  no suprapubic tenderness,   no  lackey  Musculoskeletal:   b/l BKA, R hand s/p 3rd digit amp, index tip with wound and no nail, multiple scabs, s/p I&D of forearm with dressing  vascular: no phlebitis  Skin:    no rash                          10.1   6.52  )-----------( 445      ( 16 Mar 2023 02:30 )             32.9       03-16    135  |  95<L>  |  29<H>  ----------------------------<  202<H>  4.1   |  22  |  5.96<H>    Ca    8.7      16 Mar 2023 01:42  Phos  5.5     03-16  Mg     2.30     03-16            MICROBIOLOGY:  v  .Nose  03-07-23   No staphylococcus aureus isolated.  "PCR is more Sensitive for identifying MRSA/MSSA."  --  --      .Blood Blood  03-06-23   No Growth Final  --  --      .Blood Blood  03-06-23   No Growth Final  --  --                RADIOLOGY:  Images independently visualized and reviewed personally, findings as below

## 2023-03-17 NOTE — PROGRESS NOTE ADULT - SUBJECTIVE AND OBJECTIVE BOX
ANESTHESIA POSTOP CHECK    47y Male POSTOP DAY 1 S/P     [ X] NO APPARENT ANESTHESIA COMPLICATIONS      Comments:

## 2023-03-17 NOTE — PROGRESS NOTE ADULT - SUBJECTIVE AND OBJECTIVE BOX
[facility] Division of Hospital Medicine  Vivien Rodriguez M.D  Pager [phone]  Available via MS Teams    SUBJECTIVE / OVERNIGHT EVENTS: No acute events overnight. Patient has dome pain this AM resolved with pain medications     ADDITIONAL REVIEW OF SYSTEMS:    MEDICATIONS  (STANDING):  acetaminophen     Tablet .. 975 milliGRAM(s) Oral every 8 hours  ampicillin/sulbactam  IVPB      ampicillin/sulbactam  IVPB 3 Gram(s) IV Intermittent every 24 hours  aspirin  chewable 81 milliGRAM(s) Oral daily  chlorhexidine 2% Cloths 1 Application(s) Topical daily  collagenase Ointment 1 Application(s) Topical two times a day  dextrose 5%. 1000 milliLiter(s) (100 mL/Hr) IV Continuous <Continuous>  dextrose 5%. 1000 milliLiter(s) (50 mL/Hr) IV Continuous <Continuous>  dextrose 50% Injectable 25 Gram(s) IV Push once  dextrose 50% Injectable 12.5 Gram(s) IV Push once  dextrose 50% Injectable 25 Gram(s) IV Push once  dorzolamide 2%/timolol 0.5% Ophthalmic Solution 1 Drop(s) Both EYES two times a day  glucagon  Injectable 1 milliGRAM(s) IntraMuscular once  insulin glargine Injectable (LANTUS) 3 Unit(s) SubCutaneous at bedtime  insulin lispro (ADMELOG) corrective regimen sliding scale   SubCutaneous at bedtime  insulin lispro (ADMELOG) corrective regimen sliding scale   SubCutaneous three times a day before meals  senna 2 Tablet(s) Oral at bedtime    MEDICATIONS  (PRN):  dextrose Oral Gel 15 Gram(s) Oral once PRN Blood Glucose LESS THAN 70 milliGRAM(s)/deciliter  oxyCODONE    IR 5 milliGRAM(s) Oral every 4 hours PRN Moderate Pain (4 - 6)  oxyCODONE    IR 10 milliGRAM(s) Oral every 4 hours PRN Severe Pain (7 - 10)      I&O's Summary    17 Mar 2023 07:01  -  17 Mar 2023 13:51  --------------------------------------------------------  IN: 0 mL / OUT: 0 mL / NET: 0 mL        PHYSICAL EXAM:  Vital Signs Last 24 Hrs  T(C): 37.2 (17 Mar 2023 13:00), Max: 37.2 (17 Mar 2023 13:00)  T(F): 99 (17 Mar 2023 13:00), Max: 99 (17 Mar 2023 13:00)  HR: 74 (17 Mar 2023 13:00) (69 - 82)  BP: 114/52 (17 Mar 2023 13:00) (114/52 - 161/77)  BP(mean): 94 (16 Mar 2023 17:30) (88 - 97)  RR: 17 (17 Mar 2023 13:00) (12 - 18)  SpO2: 95% (17 Mar 2023 13:00) (95% - 100%)    Parameters below as of 17 Mar 2023 13:00  Patient On (Oxygen Delivery Method): room air      CONSTITUTIONAL: NAD, well-developed, well-groomed  EYES: PERRLA; conjunctiva and sclera clear  ENMT: Moist oral mucosa, no pharyngeal injection or exudates; normal dentition  NECK: Supple, no palpable masses; no thyromegaly  RESPIRATORY: Normal respiratory effort; lungs are clear to auscultation bilaterally  CARDIOVASCULAR: Regular rate and rhythm, normal S1 and S2, Peripheral pulses are 2+ bilaterally  ABDOMEN: Nontender to palpation, normoactive bowel sounds, no rebound/guarding; No hepatosplenomegaly  MUSCULOSKELETAL: B/L LE amputations +BKA   SKIN: Right hand dressing C/D/I          LABS:                        10.1   6.52  )-----------( 445      ( 16 Mar 2023 02:30 )             32.9     03-16    135  |  95<L>  |  29<H>  ----------------------------<  202<H>  4.1   |  22  |  5.96<H>    Ca    8.7      16 Mar 2023 01:42  Phos  5.5     03-16  Mg     2.30     03-16      PT/INR - ( 16 Mar 2023 01:42 )   PT: 13.0 sec;   INR: 1.12 ratio         PTT - ( 16 Mar 2023 01:42 )  PTT:25.5 sec          COVID-19 PCR: NotDetec (14 Mar 2023 19:47)  COVID-19 PCR: Detected (06 Mar 2023 18:00)  COVID-19 PCR: NotDetec (23 Feb 2023 11:47)  COVID-19 PCR: NotDetec (16 Feb 2023 07:05)  COVID-19 PCR: NotDetec (13 Feb 2023 07:36)  COVID-19 PCR: NotDetec (06 Feb 2023 09:14)  COVID-19 PCR: NotDetec (03 Feb 2023 12:15)  COVID-19 PCR: NotDetec (01 Feb 2023 11:47)  COVID-19 PCR: NotDetec (23 Jan 2023 13:06)  COVID-19 PCR: NotDetec (18 Jan 2023 21:06)  COVID-19 PCR: NotDetec (16 Jan 2023 02:44)  SARS-CoV-2: NotDetec (12 Jan 2023 18:16)  COVID-19 PCR: NotDetec (10 Sudeep 2023 00:27)          COMMUNICATION:  Care Discussed with Consultants/Other Providers and Details of Discussion: ortho PA

## 2023-03-17 NOTE — PROGRESS NOTE ADULT - SUBJECTIVE AND OBJECTIVE BOX
Patient is a 47y Male whom presented to the hospital with esrd on hd    PAST MEDICAL & SURGICAL HISTORY:  ESRD on dialysis      H/O hypotension      Diabetes mellitus      S/P BKA (below knee amputation) bilateral      AV fistula          MEDICATIONS  (STANDING):  acetaminophen     Tablet .. 975 milliGRAM(s) Oral every 8 hours  ampicillin/sulbactam  IVPB      aspirin  chewable 81 milliGRAM(s) Oral daily  dextrose 5%. 1000 milliLiter(s) (50 mL/Hr) IV Continuous <Continuous>  dextrose 5%. 1000 milliLiter(s) (100 mL/Hr) IV Continuous <Continuous>  dextrose 50% Injectable 25 Gram(s) IV Push once  dextrose 50% Injectable 12.5 Gram(s) IV Push once  dextrose 50% Injectable 25 Gram(s) IV Push once  glucagon  Injectable 1 milliGRAM(s) IntraMuscular once  insulin lispro (ADMELOG) corrective regimen sliding scale   SubCutaneous three times a day before meals  lactated ringers. 1000 milliLiter(s) (100 mL/Hr) IV Continuous <Continuous>      Allergies    No Known Drug Allergies  [place] (Rash)    Intolerances        SOCIAL HISTORY:  Denies ETOh,Smoking,     FAMILY HISTORY:  No pertinent family history in first degree relatives        REVIEW OF SYSTEMS:    CONSTITUTIONAL: No weakness, fevers or chills                                        9.0    8.41  )-----------( 432      ( 17 Mar 2023 17:00 )             29.2       CBC Full  -  ( 17 Mar 2023 17:00 )  WBC Count : 8.41 K/uL  RBC Count : 3.38 M/uL  Hemoglobin : 9.0 g/dL  Hematocrit : 29.2 %  Platelet Count - Automated : 432 K/uL  Mean Cell Volume : 86.4 fL  Mean Cell Hemoglobin : 26.6 pg  Mean Cell Hemoglobin Concentration : 30.8 gm/dL  Auto Neutrophil # : x  Auto Lymphocyte # : x  Auto Monocyte # : x  Auto Eosinophil # : x  Auto Basophil # : x  Auto Neutrophil % : x  Auto Lymphocyte % : x  Auto Monocyte % : x  Auto Eosinophil % : x  Auto Basophil % : x      03-17    137  |  94<L>  |  56<H>  ----------------------------<  193<H>  5.8<H>   |  22  |  9.00<H>    Ca    8.6      17 Mar 2023 17:00  Phos  5.5     03-16  Mg     2.30     03-16        CAPILLARY BLOOD GLUCOSE      POCT Blood Glucose.: 203 mg/dL (17 Mar 2023 16:30)  POCT Blood Glucose.: 107 mg/dL (17 Mar 2023 11:15)  POCT Blood Glucose.: 167 mg/dL (17 Mar 2023 07:11)  POCT Blood Glucose.: 204 mg/dL (17 Mar 2023 00:51)  POCT Blood Glucose.: 173 mg/dL (16 Mar 2023 21:18)      Vital Signs Last 24 Hrs  T(C): 37 (17 Mar 2023 16:58), Max: 37.2 (17 Mar 2023 13:00)  T(F): 98.6 (17 Mar 2023 16:58), Max: 99 (17 Mar 2023 13:00)  HR: 72 (17 Mar 2023 16:58) (69 - 77)  BP: 163/85 (17 Mar 2023 16:58) (114/52 - 163/85)  BP(mean): --  RR: 16 (17 Mar 2023 16:58) (16 - 18)  SpO2: 95% (17 Mar 2023 13:00) (95% - 100%)    Parameters below as of 17 Mar 2023 16:58  Patient On (Oxygen Delivery Method): room air            PT/INR - ( 16 Mar 2023 01:42 )   PT: 13.0 sec;   INR: 1.12 ratio         PTT - ( 16 Mar 2023 01:42 )  PTT:25.5 sec                                                                  PHYSICAL EXAM:    Constitutional: NAD  HEENT: conjunctive   clear   Neck:  No JVD  Respiratory: CTAB  Cardiovascular: S1 and S2  Gastrointestinal: BS+, soft, NT/ND  Extremities: No peripheral edema ,  Prior right 3rd digit amputation.S/P BKA (below knee amputation) bilateral  Access: pos fistula

## 2023-03-17 NOTE — PROGRESS NOTE ADULT - PROBLEM SELECTOR PLAN 6
-diabetic retinopathy.  severe proliferative diabetic retinopathy, previously on cosopt, optho outpt follow up   -XnM2p=1.5%. FS better controlled in-house.  -c/w ISS for now and continue to monitor FS closely.  -patient on 4 units premeals and 5 units at bedtime  - was decreased to 3 units in setting of ORon 3/16 , will increase back to home dose   -will titrate up as necessary  -c/w ASA daily

## 2023-03-18 LAB
GLUCOSE BLDC GLUCOMTR-MCNC: 115 MG/DL — HIGH (ref 70–99)
GLUCOSE BLDC GLUCOMTR-MCNC: 136 MG/DL — HIGH (ref 70–99)
GLUCOSE BLDC GLUCOMTR-MCNC: 186 MG/DL — HIGH (ref 70–99)
GLUCOSE BLDC GLUCOMTR-MCNC: 259 MG/DL — HIGH (ref 70–99)

## 2023-03-18 PROCEDURE — 99233 SBSQ HOSP IP/OBS HIGH 50: CPT

## 2023-03-18 RX ORDER — SODIUM ZIRCONIUM CYCLOSILICATE 10 G/10G
10 POWDER, FOR SUSPENSION ORAL DAILY
Refills: 0 | Status: COMPLETED | OUTPATIENT
Start: 2023-03-18 | End: 2023-03-23

## 2023-03-18 RX ADMIN — Medication 1: at 21:54

## 2023-03-18 RX ADMIN — Medication 975 MILLIGRAM(S): at 22:40

## 2023-03-18 RX ADMIN — Medication 975 MILLIGRAM(S): at 21:52

## 2023-03-18 RX ADMIN — Medication 1 APPLICATION(S): at 05:37

## 2023-03-18 RX ADMIN — Medication 81 MILLIGRAM(S): at 11:26

## 2023-03-18 RX ADMIN — Medication 975 MILLIGRAM(S): at 05:37

## 2023-03-18 RX ADMIN — Medication 975 MILLIGRAM(S): at 14:31

## 2023-03-18 RX ADMIN — AMPICILLIN SODIUM AND SULBACTAM SODIUM 200 GRAM(S): 250; 125 INJECTION, POWDER, FOR SUSPENSION INTRAMUSCULAR; INTRAVENOUS at 10:47

## 2023-03-18 RX ADMIN — DORZOLAMIDE HYDROCHLORIDE TIMOLOL MALEATE 1 DROP(S): 20; 5 SOLUTION/ DROPS OPHTHALMIC at 05:37

## 2023-03-18 RX ADMIN — Medication 975 MILLIGRAM(S): at 06:37

## 2023-03-18 RX ADMIN — DORZOLAMIDE HYDROCHLORIDE TIMOLOL MALEATE 1 DROP(S): 20; 5 SOLUTION/ DROPS OPHTHALMIC at 17:42

## 2023-03-18 RX ADMIN — OXYCODONE HYDROCHLORIDE 10 MILLIGRAM(S): 5 TABLET ORAL at 22:40

## 2023-03-18 RX ADMIN — Medication 975 MILLIGRAM(S): at 14:29

## 2023-03-18 RX ADMIN — Medication 1 APPLICATION(S): at 17:43

## 2023-03-18 RX ADMIN — CHLORHEXIDINE GLUCONATE 1 APPLICATION(S): 213 SOLUTION TOPICAL at 10:48

## 2023-03-18 RX ADMIN — Medication 2: at 11:25

## 2023-03-18 RX ADMIN — INSULIN GLARGINE 4 UNIT(S): 100 INJECTION, SOLUTION SUBCUTANEOUS at 21:53

## 2023-03-18 RX ADMIN — OXYCODONE HYDROCHLORIDE 10 MILLIGRAM(S): 5 TABLET ORAL at 21:52

## 2023-03-18 RX ADMIN — SODIUM ZIRCONIUM CYCLOSILICATE 10 GRAM(S): 10 POWDER, FOR SUSPENSION ORAL at 14:29

## 2023-03-18 NOTE — PROGRESS NOTE ADULT - SUBJECTIVE AND OBJECTIVE BOX
Patient is a 47y Male whom presented to the hospital with esrd on hd    PAST MEDICAL & SURGICAL HISTORY:  ESRD on dialysis      H/O hypotension      Diabetes mellitus      S/P BKA (below knee amputation) bilateral      AV fistula          MEDICATIONS  (STANDING):  acetaminophen     Tablet .. 975 milliGRAM(s) Oral every 8 hours  ampicillin/sulbactam  IVPB      aspirin  chewable 81 milliGRAM(s) Oral daily  dextrose 5%. 1000 milliLiter(s) (50 mL/Hr) IV Continuous <Continuous>  dextrose 5%. 1000 milliLiter(s) (100 mL/Hr) IV Continuous <Continuous>  dextrose 50% Injectable 25 Gram(s) IV Push once  dextrose 50% Injectable 12.5 Gram(s) IV Push once  dextrose 50% Injectable 25 Gram(s) IV Push once  glucagon  Injectable 1 milliGRAM(s) IntraMuscular once  insulin lispro (ADMELOG) corrective regimen sliding scale   SubCutaneous three times a day before meals  lactated ringers. 1000 milliLiter(s) (100 mL/Hr) IV Continuous <Continuous>      Allergies    No Known Drug Allergies  [place] (Rash)    Intolerances        SOCIAL HISTORY:  Denies ETOh,Smoking,     FAMILY HISTORY:  No pertinent family history in first degree relatives        REVIEW OF SYSTEMS:    CONSTITUTIONAL: No weakness, fevers or chills                                                         9.0    8.41  )-----------( 432      ( 17 Mar 2023 17:00 )             29.2       CBC Full  -  ( 17 Mar 2023 17:00 )  WBC Count : 8.41 K/uL  RBC Count : 3.38 M/uL  Hemoglobin : 9.0 g/dL  Hematocrit : 29.2 %  Platelet Count - Automated : 432 K/uL  Mean Cell Volume : 86.4 fL  Mean Cell Hemoglobin : 26.6 pg  Mean Cell Hemoglobin Concentration : 30.8 gm/dL  Auto Neutrophil # : x  Auto Lymphocyte # : x  Auto Monocyte # : x  Auto Eosinophil # : x  Auto Basophil # : x  Auto Neutrophil % : x  Auto Lymphocyte % : x  Auto Monocyte % : x  Auto Eosinophil % : x  Auto Basophil % : x      03-17    137  |  94<L>  |  56<H>  ----------------------------<  193<H>  5.8<H>   |  22  |  9.00<H>    Ca    8.6      17 Mar 2023 17:00        CAPILLARY BLOOD GLUCOSE      POCT Blood Glucose.: 186 mg/dL (18 Mar 2023 11:13)  POCT Blood Glucose.: 115 mg/dL (18 Mar 2023 07:28)  POCT Blood Glucose.: 209 mg/dL (17 Mar 2023 21:46)  POCT Blood Glucose.: 203 mg/dL (17 Mar 2023 16:30)      Vital Signs Last 24 Hrs  T(C): 36.9 (18 Mar 2023 12:05), Max: 37 (17 Mar 2023 16:58)  T(F): 98.4 (18 Mar 2023 12:05), Max: 98.6 (17 Mar 2023 16:58)  HR: 82 (18 Mar 2023 12:05) (72 - 85)  BP: 136/68 (18 Mar 2023 12:05) (112/54 - 163/85)  BP(mean): --  RR: 18 (18 Mar 2023 12:05) (16 - 18)  SpO2: 100% (18 Mar 2023 12:05) (99% - 100%)    Parameters below as of 18 Mar 2023 12:05  Patient On (Oxygen Delivery Method): room air                                    PHYSICAL EXAM:    Constitutional: NAD  HEENT: conjunctive   clear   Neck:  No JVD  Respiratory: CTAB  Cardiovascular: S1 and S2  Gastrointestinal: BS+, soft, NT/ND  Extremities: No peripheral edema ,  Prior right 3rd digit amputation.S/P BKA (below knee amputation) bilateral  Access: pos fistula

## 2023-03-18 NOTE — PROGRESS NOTE ADULT - ASSESSMENT
47M Amharic speaking COVID+ h/o DM2, b/l BKA, ESRD (HD MWF), admitted recently (1/10-2/24/23) for steal syndrome, right third finger and forearm gas gangrene s/p amputation R 3rd digit and multiple OR debridement with eventual closure, cultures with Ecoli pansensitive,  Strep angionosis and Bacteriodes fragilis, complicated Unasyn 6 week course while inpatient, now sent back for urgent irrigation and debridement of the his right hand and forearm

## 2023-03-18 NOTE — PROGRESS NOTE ADULT - SUBJECTIVE AND OBJECTIVE BOX
[facility] Division of Hospital Medicine  Kaushik Burch MD  Pager [phone]      Patient is a 47y old  Male who presents with a chief complaint of Right forearm gas gangrene (18 Mar 2023 13:23)      SUBJECTIVE / OVERNIGHT EVENTS:    pt had HD yesterday evening. No acute event o/n. cont to report pain at surgical site    ADDITIONAL REVIEW OF SYSTEMS:    RESPIRATORY: No cough, wheezing, chills or hemoptysis; No shortness of breath  CARDIOVASCULAR: No chest pain, palpitations, dizziness, or leg swelling  GASTROINTESTINAL: No abdominal or epigastric pain. No nausea, vomiting, or hematemesis; No diarrhea or constipation. No melena or hematochezia.      MEDICATIONS  (STANDING):  acetaminophen     Tablet .. 975 milliGRAM(s) Oral every 8 hours  ampicillin/sulbactam  IVPB      ampicillin/sulbactam  IVPB 3 Gram(s) IV Intermittent every 24 hours  aspirin  chewable 81 milliGRAM(s) Oral daily  chlorhexidine 2% Cloths 1 Application(s) Topical daily  collagenase Ointment 1 Application(s) Topical two times a day  dextrose 5%. 1000 milliLiter(s) (100 mL/Hr) IV Continuous <Continuous>  dextrose 5%. 1000 milliLiter(s) (50 mL/Hr) IV Continuous <Continuous>  dextrose 50% Injectable 25 Gram(s) IV Push once  dextrose 50% Injectable 12.5 Gram(s) IV Push once  dextrose 50% Injectable 25 Gram(s) IV Push once  dorzolamide 2%/timolol 0.5% Ophthalmic Solution 1 Drop(s) Both EYES two times a day  glucagon  Injectable 1 milliGRAM(s) IntraMuscular once  insulin glargine Injectable (LANTUS) 4 Unit(s) SubCutaneous at bedtime  insulin lispro (ADMELOG) corrective regimen sliding scale   SubCutaneous three times a day before meals  insulin lispro (ADMELOG) corrective regimen sliding scale   SubCutaneous at bedtime  senna 2 Tablet(s) Oral at bedtime  sodium zirconium cyclosilicate 10 Gram(s) Oral daily    MEDICATIONS  (PRN):  dextrose Oral Gel 15 Gram(s) Oral once PRN Blood Glucose LESS THAN 70 milliGRAM(s)/deciliter  oxyCODONE    IR 5 milliGRAM(s) Oral every 4 hours PRN Moderate Pain (4 - 6)  oxyCODONE    IR 10 milliGRAM(s) Oral every 4 hours PRN Severe Pain (7 - 10)      CAPILLARY BLOOD GLUCOSE      POCT Blood Glucose.: 186 mg/dL (18 Mar 2023 11:13)  POCT Blood Glucose.: 115 mg/dL (18 Mar 2023 07:28)  POCT Blood Glucose.: 209 mg/dL (17 Mar 2023 21:46)  POCT Blood Glucose.: 203 mg/dL (17 Mar 2023 16:30)    I&O's Summary    17 Mar 2023 07:01  -  18 Mar 2023 07:00  --------------------------------------------------------  IN: 400 mL / OUT: 2025 mL / NET: -1625 mL        PHYSICAL EXAM:  Vital Signs Last 24 Hrs  T(C): 36.9 (18 Mar 2023 12:05), Max: 37 (17 Mar 2023 16:58)  T(F): 98.4 (18 Mar 2023 12:05), Max: 98.6 (17 Mar 2023 16:58)  HR: 82 (18 Mar 2023 12:05) (72 - 85)  BP: 136/68 (18 Mar 2023 12:05) (112/54 - 163/85)  BP(mean): --  RR: 18 (18 Mar 2023 12:05) (16 - 18)  SpO2: 100% (18 Mar 2023 12:05) (99% - 100%)    Parameters below as of 18 Mar 2023 12:05  Patient On (Oxygen Delivery Method): room air        CONSTITUTIONAL: NAD,  EYES: PERRLA; conjunctiva and sclera clear  ENMT: Moist oral mucosa, no pharyngeal injection or exudates;   NECK: Supple, no palpable masses;  RESPIRATORY: Normal respiratory effort; lungs are clear to auscultation bilaterally  CARDIOVASCULAR: Regular rate and rhythm, normal S1 and S2, no murmur/rub/gallop; No lower extremity edema; Peripheral pulses are 2+ bilaterally  ABDOMEN: Nontender to palpation, normoactive bowel sounds, no rebound/guarding;   MUSCLOSKELETAL:   no clubbing or cyanosis of digits; no joint swelling or tenderness to palpation. B/L LE amputations +BKA  PSYCH: A+O to person, place, and time; affect appropriate  NEUROLOGY: CN 2-12 are intact and symmetric; no gross sensory deficits;    SKIN: Right hand dressing C/D/I      LABS:                        9.0    8.41  )-----------( 432      ( 17 Mar 2023 17:00 )             29.2     03-17    137  |  94<L>  |  56<H>  ----------------------------<  193<H>  5.8<H>   |  22  |  9.00<H>    Ca    8.6      17 Mar 2023 17:00                  RADIOLOGY & ADDITIONAL TESTS:  Results Reviewed:   Imaging Personally Reviewed:  Electrocardiogram Personally Reviewed:    COORDINATION OF CARE:  Care Discussed with Consultants/Other Providers [Y/N]:  Prior or Outpatient Records Reviewed [Y/N]:

## 2023-03-18 NOTE — PROGRESS NOTE ADULT - SUBJECTIVE AND OBJECTIVE BOX
Pt seen/examined. Doing well. Pain controlled. No acute overnight complaints or events.    T(C): 36.3 (03-17-23 @ 21:36), Max: 37.2 (03-17-23 @ 13:00)  HR: 78 (03-17-23 @ 21:36) (69 - 78)  BP: 140/69 (03-17-23 @ 21:36) (114/52 - 163/85)  RR: 18 (03-17-23 @ 21:36) (16 - 18)  SpO2: 100% (03-17-23 @ 21:36) (95% - 100%)  Wt(kg): --  - Gen: NAD  -     Right upper extremity : Dressing clean/dry/intact. Wound vac in place holding suction                       A/P:  Stable             DVT prophylaxis- ASA daily  Unasyn per ID            Follow up AM labs - HD            Pain control             Incentive spirometer

## 2023-03-18 NOTE — PROGRESS NOTE ADULT - PROBLEM SELECTOR PLAN 5
-diabetic retinopathy.  severe proliferative diabetic retinopathy, previously on cosopt, optho outpt follow up   -TuF3d=0.5%. FS better controlled in-house.  -c/w ISS for now and continue to monitor FS closely.  -patient on 4 units premeals and 5 units at bedtime  - c/w lantus 4 and ss. will titrate up as necessary  -c/w ASA daily

## 2023-03-18 NOTE — PROGRESS NOTE ADULT - ASSESSMENT
This is a 48yo [place]izean speaking male with PMH of DM2, b/l BKA, ESRD (on HD MWF) well known to Orthopedics for treatment of right third finger 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 Sin/Dr. Singh) who was seen by Dr. Sin today in the office and sent in to [facility] for urgent irrigation and debridement. Patient states he missed dialysis today because he was instructed to come straight to the ED.     PAST MEDICAL & SURGICAL HISTORY:  ESRD on dialysis  H/O hypotension  Diabetes mellitus  S/P BKA (below knee amputation) bilateral  AV fistula (06 Mar 2023 17:32)            esrd on hd   will plan for hd as order    Excess fluids and waste products will be removed from your blood; your electrolytes will be balanced; your blood pressure will be controlled.      ANEMIA PLAN:  Anemia of chronic disease:  Well controlled by Aranesp  H and H subtherapeutic .  We will continue Aranesp aiming for a HCT of 32-36 %.   We will monitor Iron stores, B12 and RBC folate .      ,send blood and urine cx,serial lactate levels,monitor vitals closley,ivfs hydration,monitor urine output and renal profile,iv abx

## 2023-03-18 NOTE — PROGRESS NOTE ADULT - PROBLEM SELECTOR PLAN 4
-f/u renal recs and plans for HD   -renally dose medications   -anemia of renal disease, c/w epo during HD as per renal  -renal restricted diet w/ nepro supplements  -f/u BMP (K+ levels) post-HD

## 2023-03-19 LAB
GLUCOSE BLDC GLUCOMTR-MCNC: 125 MG/DL — HIGH (ref 70–99)
GLUCOSE BLDC GLUCOMTR-MCNC: 149 MG/DL — HIGH (ref 70–99)
GLUCOSE BLDC GLUCOMTR-MCNC: 156 MG/DL — HIGH (ref 70–99)
GLUCOSE BLDC GLUCOMTR-MCNC: 200 MG/DL — HIGH (ref 70–99)

## 2023-03-19 PROCEDURE — 99232 SBSQ HOSP IP/OBS MODERATE 35: CPT

## 2023-03-19 RX ADMIN — Medication 975 MILLIGRAM(S): at 23:06

## 2023-03-19 RX ADMIN — Medication 1 APPLICATION(S): at 17:41

## 2023-03-19 RX ADMIN — Medication 1 APPLICATION(S): at 06:05

## 2023-03-19 RX ADMIN — Medication 975 MILLIGRAM(S): at 06:50

## 2023-03-19 RX ADMIN — Medication 2: at 16:36

## 2023-03-19 RX ADMIN — Medication 975 MILLIGRAM(S): at 22:21

## 2023-03-19 RX ADMIN — DORZOLAMIDE HYDROCHLORIDE TIMOLOL MALEATE 1 DROP(S): 20; 5 SOLUTION/ DROPS OPHTHALMIC at 06:04

## 2023-03-19 RX ADMIN — OXYCODONE HYDROCHLORIDE 10 MILLIGRAM(S): 5 TABLET ORAL at 23:06

## 2023-03-19 RX ADMIN — DORZOLAMIDE HYDROCHLORIDE TIMOLOL MALEATE 1 DROP(S): 20; 5 SOLUTION/ DROPS OPHTHALMIC at 17:42

## 2023-03-19 RX ADMIN — SODIUM ZIRCONIUM CYCLOSILICATE 10 GRAM(S): 10 POWDER, FOR SUSPENSION ORAL at 13:16

## 2023-03-19 RX ADMIN — Medication 975 MILLIGRAM(S): at 06:04

## 2023-03-19 RX ADMIN — CHLORHEXIDINE GLUCONATE 1 APPLICATION(S): 213 SOLUTION TOPICAL at 11:39

## 2023-03-19 RX ADMIN — INSULIN GLARGINE 4 UNIT(S): 100 INJECTION, SOLUTION SUBCUTANEOUS at 22:21

## 2023-03-19 RX ADMIN — OXYCODONE HYDROCHLORIDE 10 MILLIGRAM(S): 5 TABLET ORAL at 22:21

## 2023-03-19 RX ADMIN — AMPICILLIN SODIUM AND SULBACTAM SODIUM 200 GRAM(S): 250; 125 INJECTION, POWDER, FOR SUSPENSION INTRAMUSCULAR; INTRAVENOUS at 11:39

## 2023-03-19 RX ADMIN — Medication 2: at 07:36

## 2023-03-19 RX ADMIN — Medication 81 MILLIGRAM(S): at 11:39

## 2023-03-19 NOTE — PROGRESS NOTE ADULT - SUBJECTIVE AND OBJECTIVE BOX
ORTHOPAEDICS DAILY PROGRESS NOTE:       SUBJECTIVE/ROS:  Seen and examined. Pain well controlled. Has been working with Pt. Denies CP/SOB         MEDICATIONS  (STANDING):  acetaminophen     Tablet .. 975 milliGRAM(s) Oral every 8 hours  ampicillin/sulbactam  IVPB 3 Gram(s) IV Intermittent every 24 hours  ampicillin/sulbactam  IVPB      aspirin  chewable 81 milliGRAM(s) Oral daily  chlorhexidine 2% Cloths 1 Application(s) Topical daily  collagenase Ointment 1 Application(s) Topical two times a day  dextrose 5%. 1000 milliLiter(s) (100 mL/Hr) IV Continuous <Continuous>  dextrose 5%. 1000 milliLiter(s) (50 mL/Hr) IV Continuous <Continuous>  dextrose 50% Injectable 25 Gram(s) IV Push once  dextrose 50% Injectable 12.5 Gram(s) IV Push once  dextrose 50% Injectable 25 Gram(s) IV Push once  dorzolamide 2%/timolol 0.5% Ophthalmic Solution 1 Drop(s) Both EYES two times a day  glucagon  Injectable 1 milliGRAM(s) IntraMuscular once  insulin glargine Injectable (LANTUS) 4 Unit(s) SubCutaneous at bedtime  insulin lispro (ADMELOG) corrective regimen sliding scale   SubCutaneous three times a day before meals  insulin lispro (ADMELOG) corrective regimen sliding scale   SubCutaneous at bedtime  senna 2 Tablet(s) Oral at bedtime  sodium zirconium cyclosilicate 10 Gram(s) Oral daily    MEDICATIONS  (PRN):  dextrose Oral Gel 15 Gram(s) Oral once PRN Blood Glucose LESS THAN 70 milliGRAM(s)/deciliter  oxyCODONE    IR 10 milliGRAM(s) Oral every 4 hours PRN Severe Pain (7 - 10)  oxyCODONE    IR 5 milliGRAM(s) Oral every 4 hours PRN Moderate Pain (4 - 6)      OBJECTIVE:    Vital Signs Last 24 Hrs  T(C): 37 (18 Mar 2023 21:52), Max: 37 (18 Mar 2023 21:52)  T(F): 98.6 (18 Mar 2023 21:52), Max: 98.6 (18 Mar 2023 21:52)  HR: 65 (18 Mar 2023 21:52) (65 - 85)  BP: 148/70 (18 Mar 2023 21:52) (125/62 - 168/78)  BP(mean): --  RR: 18 (18 Mar 2023 21:52) (17 - 18)  SpO2: 100% (18 Mar 2023 21:52) (99% - 100%)    Parameters below as of 18 Mar 2023 21:52  Patient On (Oxygen Delivery Method): room air        I&O's Detail    17 Mar 2023 07:01  -  18 Mar 2023 07:00  --------------------------------------------------------  IN:    Other (mL): 400 mL  Total IN: 400 mL    OUT:    Other (mL): 1900 mL    Other (mL): 125 mL  Total OUT: 2025 mL    Total NET: -1625 mL          Daily     Daily     LABS:                        9.0    8.41  )-----------( 432      ( 17 Mar 2023 17:00 )             29.2     03-17    137  |  94<L>  |  56<H>  ----------------------------<  193<H>  5.8<H>   |  22  |  9.00<H>    Ca    8.6      17 Mar 2023 17:00                    PHYSICAL EXAM:  - Gen: NAD  Right upper extremity : Dressi c/d/i, vac holding suction, cling, wwp

## 2023-03-19 NOTE — PROGRESS NOTE ADULT - SUBJECTIVE AND OBJECTIVE BOX
[facility] Division of Hospital Medicine  Kaushik Burch MD  Pager [phone]      Patient is a 47y old  Male who presents with a chief complaint of Right forearm gas gangrene (19 Mar 2023 13:09)      SUBJECTIVE / OVERNIGHT EVENTS:    no acute event o/n     ADDITIONAL REVIEW OF SYSTEMS:    RESPIRATORY: No cough, wheezing, chills or hemoptysis; No shortness of breath  CARDIOVASCULAR: No chest pain, palpitations, dizziness, or leg swelling  GASTROINTESTINAL: No abdominal or epigastric pain. No nausea, vomiting, or hematemesis; No diarrhea or constipation. No melena or hematochezia.    MEDICATIONS  (STANDING):  acetaminophen     Tablet .. 975 milliGRAM(s) Oral every 8 hours  ampicillin/sulbactam  IVPB 3 Gram(s) IV Intermittent every 24 hours  ampicillin/sulbactam  IVPB      aspirin  chewable 81 milliGRAM(s) Oral daily  chlorhexidine 2% Cloths 1 Application(s) Topical daily  collagenase Ointment 1 Application(s) Topical two times a day  dextrose 5%. 1000 milliLiter(s) (50 mL/Hr) IV Continuous <Continuous>  dextrose 5%. 1000 milliLiter(s) (100 mL/Hr) IV Continuous <Continuous>  dextrose 50% Injectable 25 Gram(s) IV Push once  dextrose 50% Injectable 12.5 Gram(s) IV Push once  dextrose 50% Injectable 25 Gram(s) IV Push once  dorzolamide 2%/timolol 0.5% Ophthalmic Solution 1 Drop(s) Both EYES two times a day  glucagon  Injectable 1 milliGRAM(s) IntraMuscular once  insulin glargine Injectable (LANTUS) 4 Unit(s) SubCutaneous at bedtime  insulin lispro (ADMELOG) corrective regimen sliding scale   SubCutaneous at bedtime  insulin lispro (ADMELOG) corrective regimen sliding scale   SubCutaneous three times a day before meals  senna 2 Tablet(s) Oral at bedtime  sodium zirconium cyclosilicate 10 Gram(s) Oral daily    MEDICATIONS  (PRN):  dextrose Oral Gel 15 Gram(s) Oral once PRN Blood Glucose LESS THAN 70 milliGRAM(s)/deciliter  oxyCODONE    IR 5 milliGRAM(s) Oral every 4 hours PRN Moderate Pain (4 - 6)  oxyCODONE    IR 10 milliGRAM(s) Oral every 4 hours PRN Severe Pain (7 - 10)      CAPILLARY BLOOD GLUCOSE      POCT Blood Glucose.: 125 mg/dL (19 Mar 2023 11:14)  POCT Blood Glucose.: 156 mg/dL (19 Mar 2023 07:26)  POCT Blood Glucose.: 259 mg/dL (18 Mar 2023 21:44)  POCT Blood Glucose.: 136 mg/dL (18 Mar 2023 16:23)    I&O's Summary    18 Mar 2023 07:01  -  19 Mar 2023 07:00  --------------------------------------------------------  IN: 0 mL / OUT: 0 mL / NET: 0 mL    19 Mar 2023 07:01  -  19 Mar 2023 14:19  --------------------------------------------------------  IN: 0 mL / OUT: 50 mL / NET: -50 mL        PHYSICAL EXAM:  Vital Signs Last 24 Hrs  T(C): 36.7 (19 Mar 2023 09:09), Max: 37 (18 Mar 2023 21:52)  T(F): 98.1 (19 Mar 2023 09:09), Max: 98.6 (18 Mar 2023 21:52)  HR: 62 (19 Mar 2023 09:09) (62 - 69)  BP: 114/66 (19 Mar 2023 09:09) (114/66 - 168/78)  BP(mean): --  RR: 17 (19 Mar 2023 09:09) (17 - 18)  SpO2: 95% (19 Mar 2023 09:09) (95% - 100%)    Parameters below as of 19 Mar 2023 09:09  Patient On (Oxygen Delivery Method): room air          CONSTITUTIONAL: NAD,  EYES: PERRLA; conjunctiva and sclera clear  ENMT: Moist oral mucosa, no pharyngeal injection or exudates;   NECK: Supple, no palpable masses;  RESPIRATORY: Normal respiratory effort; lungs are clear to auscultation bilaterally  CARDIOVASCULAR: Regular rate and rhythm, normal S1 and S2, no murmur/rub/gallop; No lower extremity edema; Peripheral pulses are 2+ bilaterally  ABDOMEN: Nontender to palpation, normoactive bowel sounds, no rebound/guarding;   MUSCLOSKELETAL:   no clubbing or cyanosis of digits; no joint swelling or tenderness to palpation. B/L LE amputations +BKA  PSYCH: A+O to person, place, and time; affect appropriate  NEUROLOGY: CN 2-12 are intact and symmetric; no gross sensory deficits;    SKIN: Right hand dressing C/D/I      LABS:                        9.0    8.41  )-----------( 432      ( 17 Mar 2023 17:00 )             29.2     03-17    137  |  94<L>  |  56<H>  ----------------------------<  193<H>  5.8<H>   |  22  |  9.00<H>    Ca    8.6      17 Mar 2023 17:00                  RADIOLOGY & ADDITIONAL TESTS:  Results Reviewed:   Imaging Personally Reviewed:  Electrocardiogram Personally Reviewed:    COORDINATION OF CARE:  Care Discussed with Consultants/Other Providers [Y/N]:  Prior or Outpatient Records Reviewed [Y/N]:

## 2023-03-19 NOTE — PROGRESS NOTE ADULT - ASSESSMENT
A/P: 46 yo M, Stable      DVT prophylaxis- ASA daily  Unasyn per ID  Follow up AM labs - HD  Pain control    Incentive spirometer  VAC change monday

## 2023-03-19 NOTE — PROGRESS NOTE ADULT - PROBLEM SELECTOR PLAN 5
-diabetic retinopathy.  severe proliferative diabetic retinopathy, previously on cosopt, optho outpt follow up   -EsA6x=9.5%. FS better controlled in-house.  -c/w ISS for now and continue to monitor FS closely.  -patient on 4 units premeals and 5 units at bedtime  - c/w lantus 4 and ss. will titrate up as necessary  -c/w ASA daily

## 2023-03-19 NOTE — PROGRESS NOTE ADULT - ASSESSMENT
This is a 46yo Barbadian speaking male with PMH of DM2, b/l BKA, ESRD (on HD MWF) well known to Orthopedics for treatment of right third finger 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 Sin/Dr. Singh) who was seen by Dr. Sin today in the office and sent in to [facility] for urgent irrigation and debridement. Patient states he missed dialysis today because he was instructed to come straight to the ED.     PAST MEDICAL & SURGICAL HISTORY:  ESRD on dialysis  H/O hypotension  Diabetes mellitus  S/P BKA (below knee amputation) bilateral  AV fistula (06 Mar 2023 17:32)            esrd on hd   will plan for hd as order    Excess fluids and waste products will be removed from your blood; your electrolytes will be balanced; your blood pressure will be controlled.      ANEMIA PLAN:  Anemia of chronic disease:  Well controlled by Aranesp  H and H subtherapeutic .  We will continue Aranesp aiming for a HCT of 32-36 %.   We will monitor Iron stores, B12 and RBC folate .      ,send blood and urine cx,serial lactate levels,monitor vitals closley,ivfs hydration,monitor urine output and renal profile,iv abx

## 2023-03-19 NOTE — PROGRESS NOTE ADULT - SUBJECTIVE AND OBJECTIVE BOX
Patient is a 47y Male whom presented to the hospital with esrd on hd    PAST MEDICAL & SURGICAL HISTORY:  ESRD on dialysis      H/O hypotension      Diabetes mellitus      S/P BKA (below knee amputation) bilateral      AV fistula          MEDICATIONS  (STANDING):  acetaminophen     Tablet .. 975 milliGRAM(s) Oral every 8 hours  ampicillin/sulbactam  IVPB      aspirin  chewable 81 milliGRAM(s) Oral daily  dextrose 5%. 1000 milliLiter(s) (50 mL/Hr) IV Continuous <Continuous>  dextrose 5%. 1000 milliLiter(s) (100 mL/Hr) IV Continuous <Continuous>  dextrose 50% Injectable 25 Gram(s) IV Push once  dextrose 50% Injectable 12.5 Gram(s) IV Push once  dextrose 50% Injectable 25 Gram(s) IV Push once  glucagon  Injectable 1 milliGRAM(s) IntraMuscular once  insulin lispro (ADMELOG) corrective regimen sliding scale   SubCutaneous three times a day before meals  lactated ringers. 1000 milliLiter(s) (100 mL/Hr) IV Continuous <Continuous>      Allergies    No Known Drug Allergies  [place] (Rash)    Intolerances        SOCIAL HISTORY:  Denies ETOh,Smoking,     FAMILY HISTORY:  No pertinent family history in first degree relatives        REVIEW OF SYSTEMS:    CONSTITUTIONAL: No weakness, fevers or chills                                                                               9.0    8.41  )-----------( 432      ( 17 Mar 2023 17:00 )             29.2       CBC Full  -  ( 17 Mar 2023 17:00 )  WBC Count : 8.41 K/uL  RBC Count : 3.38 M/uL  Hemoglobin : 9.0 g/dL  Hematocrit : 29.2 %  Platelet Count - Automated : 432 K/uL  Mean Cell Volume : 86.4 fL  Mean Cell Hemoglobin : 26.6 pg  Mean Cell Hemoglobin Concentration : 30.8 gm/dL  Auto Neutrophil # : x  Auto Lymphocyte # : x  Auto Monocyte # : x  Auto Eosinophil # : x  Auto Basophil # : x  Auto Neutrophil % : x  Auto Lymphocyte % : x  Auto Monocyte % : x  Auto Eosinophil % : x  Auto Basophil % : x      03-17    137  |  94<L>  |  56<H>  ----------------------------<  193<H>  5.8<H>   |  22  |  9.00<H>    Ca    8.6      17 Mar 2023 17:00        CAPILLARY BLOOD GLUCOSE      POCT Blood Glucose.: 125 mg/dL (19 Mar 2023 11:14)  POCT Blood Glucose.: 156 mg/dL (19 Mar 2023 07:26)  POCT Blood Glucose.: 259 mg/dL (18 Mar 2023 21:44)  POCT Blood Glucose.: 136 mg/dL (18 Mar 2023 16:23)      Vital Signs Last 24 Hrs  T(C): 36.7 (19 Mar 2023 09:09), Max: 37 (18 Mar 2023 21:52)  T(F): 98.1 (19 Mar 2023 09:09), Max: 98.6 (18 Mar 2023 21:52)  HR: 62 (19 Mar 2023 09:09) (62 - 69)  BP: 114/66 (19 Mar 2023 09:09) (114/66 - 168/78)  BP(mean): --  RR: 17 (19 Mar 2023 09:09) (17 - 18)  SpO2: 95% (19 Mar 2023 09:09) (95% - 100%)    Parameters below as of 19 Mar 2023 09:09  Patient On (Oxygen Delivery Method): room air                                        PHYSICAL EXAM:    Constitutional: NAD  HEENT: conjunctive   clear   Neck:  No JVD  Respiratory: CTAB  Cardiovascular: S1 and S2  Gastrointestinal: BS+, soft, NT/ND  Extremities: No peripheral edema ,  Prior right 3rd digit amputation.S/P BKA (below knee amputation) bilateral  Access: pos fistula

## 2023-03-19 NOTE — PROGRESS NOTE ADULT - ASSESSMENT
47M Tajik speaking COVID+ h/o DM2, b/l BKA, ESRD (HD MWF), admitted recently (1/10-2/24/23) for steal syndrome, right third finger and forearm gas gangrene s/p amputation R 3rd digit and multiple OR debridement with eventual closure, cultures with Ecoli pansensitive,  Strep angionosis and Bacteriodes fragilis, complicated Unasyn 6 week course while inpatient, now sent back for urgent irrigation and debridement of the his right hand and forearm

## 2023-03-20 LAB
ANION GAP SERPL CALC-SCNC: 24 MMOL/L — HIGH (ref 7–14)
BUN SERPL-MCNC: 83 MG/DL — HIGH (ref 7–23)
CALCIUM SERPL-MCNC: 8.3 MG/DL — LOW (ref 8.4–10.5)
CHLORIDE SERPL-SCNC: 98 MMOL/L — SIGNIFICANT CHANGE UP (ref 98–107)
CO2 SERPL-SCNC: 16 MMOL/L — LOW (ref 22–31)
CREAT SERPL-MCNC: 9.54 MG/DL — HIGH (ref 0.5–1.3)
EGFR: 6 ML/MIN/1.73M2 — LOW
GLUCOSE BLDC GLUCOMTR-MCNC: 107 MG/DL — HIGH (ref 70–99)
GLUCOSE BLDC GLUCOMTR-MCNC: 127 MG/DL — HIGH (ref 70–99)
GLUCOSE BLDC GLUCOMTR-MCNC: 142 MG/DL — HIGH (ref 70–99)
GLUCOSE BLDC GLUCOMTR-MCNC: 159 MG/DL — HIGH (ref 70–99)
GLUCOSE SERPL-MCNC: 117 MG/DL — HIGH (ref 70–99)
HCT VFR BLD CALC: 28.2 % — LOW (ref 39–50)
HGB BLD-MCNC: 8.7 G/DL — LOW (ref 13–17)
MCHC RBC-ENTMCNC: 26.9 PG — LOW (ref 27–34)
MCHC RBC-ENTMCNC: 30.9 GM/DL — LOW (ref 32–36)
MCV RBC AUTO: 87 FL — SIGNIFICANT CHANGE UP (ref 80–100)
NRBC # BLD: 0 /100 WBCS — SIGNIFICANT CHANGE UP (ref 0–0)
NRBC # FLD: 0 K/UL — SIGNIFICANT CHANGE UP (ref 0–0)
PLATELET # BLD AUTO: 393 K/UL — SIGNIFICANT CHANGE UP (ref 150–400)
POTASSIUM SERPL-MCNC: 6 MMOL/L — HIGH (ref 3.5–5.3)
POTASSIUM SERPL-SCNC: 6 MMOL/L — HIGH (ref 3.5–5.3)
RBC # BLD: 3.24 M/UL — LOW (ref 4.2–5.8)
RBC # FLD: 15.1 % — HIGH (ref 10.3–14.5)
SODIUM SERPL-SCNC: 138 MMOL/L — SIGNIFICANT CHANGE UP (ref 135–145)
WBC # BLD: 8.22 K/UL — SIGNIFICANT CHANGE UP (ref 3.8–10.5)
WBC # FLD AUTO: 8.22 K/UL — SIGNIFICANT CHANGE UP (ref 3.8–10.5)

## 2023-03-20 PROCEDURE — 99232 SBSQ HOSP IP/OBS MODERATE 35: CPT

## 2023-03-20 RX ADMIN — OXYCODONE HYDROCHLORIDE 10 MILLIGRAM(S): 5 TABLET ORAL at 15:10

## 2023-03-20 RX ADMIN — DORZOLAMIDE HYDROCHLORIDE TIMOLOL MALEATE 1 DROP(S): 20; 5 SOLUTION/ DROPS OPHTHALMIC at 17:30

## 2023-03-20 RX ADMIN — Medication 975 MILLIGRAM(S): at 14:22

## 2023-03-20 RX ADMIN — OXYCODONE HYDROCHLORIDE 10 MILLIGRAM(S): 5 TABLET ORAL at 14:21

## 2023-03-20 RX ADMIN — DORZOLAMIDE HYDROCHLORIDE TIMOLOL MALEATE 1 DROP(S): 20; 5 SOLUTION/ DROPS OPHTHALMIC at 06:33

## 2023-03-20 RX ADMIN — OXYCODONE HYDROCHLORIDE 10 MILLIGRAM(S): 5 TABLET ORAL at 07:05

## 2023-03-20 RX ADMIN — Medication 975 MILLIGRAM(S): at 15:26

## 2023-03-20 RX ADMIN — SODIUM ZIRCONIUM CYCLOSILICATE 10 GRAM(S): 10 POWDER, FOR SUSPENSION ORAL at 11:59

## 2023-03-20 RX ADMIN — Medication 1 APPLICATION(S): at 06:33

## 2023-03-20 RX ADMIN — OXYCODONE HYDROCHLORIDE 10 MILLIGRAM(S): 5 TABLET ORAL at 06:35

## 2023-03-20 RX ADMIN — Medication 1 APPLICATION(S): at 18:55

## 2023-03-20 RX ADMIN — Medication 975 MILLIGRAM(S): at 06:33

## 2023-03-20 RX ADMIN — INSULIN GLARGINE 4 UNIT(S): 100 INJECTION, SOLUTION SUBCUTANEOUS at 22:58

## 2023-03-20 RX ADMIN — Medication 81 MILLIGRAM(S): at 11:58

## 2023-03-20 RX ADMIN — Medication 975 MILLIGRAM(S): at 23:20

## 2023-03-20 RX ADMIN — AMPICILLIN SODIUM AND SULBACTAM SODIUM 200 GRAM(S): 250; 125 INJECTION, POWDER, FOR SUSPENSION INTRAMUSCULAR; INTRAVENOUS at 09:49

## 2023-03-20 RX ADMIN — CHLORHEXIDINE GLUCONATE 1 APPLICATION(S): 213 SOLUTION TOPICAL at 11:58

## 2023-03-20 RX ADMIN — Medication 975 MILLIGRAM(S): at 07:05

## 2023-03-20 NOTE — PROGRESS NOTE ADULT - SUBJECTIVE AND OBJECTIVE BOX
Orthopedic Progress Note     S:  No acute events overnight, pain is well controlled.  Patient denies any chest pain, SOB, N/V, fevers/chills.    T(C): 36.8 (03-21-23 @ 01:59), Max: 37.2 (03-20-23 @ 09:36)  HR: 74 (03-21-23 @ 01:59) (64 - 74)  BP: 149/53 (03-21-23 @ 01:59) (124/61 - 178/89)  RR: 16 (03-21-23 @ 01:59) (16 - 18)  SpO2: 99% (03-21-23 @ 01:59) (97% - 100%)  Wt(kg): --I&O's Summary    19 Mar 2023 07:01  -  20 Mar 2023 07:00  --------------------------------------------------------  IN: 0 mL / OUT: 75 mL / NET: -75 mL    20 Mar 2023 07:01  -  21 Mar 2023 05:33  --------------------------------------------------------  IN: 400 mL / OUT: 1900 mL / NET: -1500 mL        O:  Physical exam:  Gen: Alert and Oriented x3, No Acute Distress  Right upper extremity : Vac dressing c/d/i, vac holding suction, cling, wwp           Labs:                        8.7    8.22  )-----------( 393      ( 20 Mar 2023 20:40 )             28.2    03-20    138  |  98  |  83<H>  ----------------------------<  117<H>  6.0<H>   |  16<L>  |  9.54<H>    Ca    8.3<L>      20 Mar 2023 20:40

## 2023-03-20 NOTE — PROGRESS NOTE ADULT - ASSESSMENT
A/P: 48 yo M, Stable      DVT prophylaxis- ASA daily  Unasyn per ID  Follow up AM labs - HD  Pain control    Incentive spirometer  VAC change Today   Continue abx

## 2023-03-20 NOTE — PROGRESS NOTE ADULT - PROBLEM SELECTOR PLAN 6
- difficult disposition - no insurance, unable to obtain VAC for wound management at [facility].  Awaiting for improvement of wound situation to be off VAC.

## 2023-03-20 NOTE — PROGRESS NOTE ADULT - ASSESSMENT
47M DM2 c/b PAD w/ B/L BKA, ESRD - HD MWF, admission for Vascular steal syndrome c/b gas gangrene of Rt middle finger and forearm s/p amputation and debridement on 1-2/23, on long-term IV Abx, p/w OM s/p I+D on 3/6.

## 2023-03-20 NOTE — PROGRESS NOTE ADULT - PROBLEM SELECTOR PLAN 5
-diabetic retinopathy.  severe proliferative diabetic retinopathy, previously on cosopt, optho outpt follow up   -VlK1x=0.5%. FS better controlled in-house.  -c/w ISS for now and continue to monitor FS closely.  -patient on 4 units premeals and 5 units at bedtime  - c/w lantus 4 and ss. will titrate up as necessary  -c/w ASA daily

## 2023-03-20 NOTE — PROGRESS NOTE ADULT - SUBJECTIVE AND OBJECTIVE BOX
Patient is a 47y Male whom presented to the hospital with esrd on hd    PAST MEDICAL & SURGICAL HISTORY:  ESRD on dialysis      H/O hypotension      Diabetes mellitus      S/P BKA (below knee amputation) bilateral      AV fistula          MEDICATIONS  (STANDING):  acetaminophen     Tablet .. 975 milliGRAM(s) Oral every 8 hours  ampicillin/sulbactam  IVPB      aspirin  chewable 81 milliGRAM(s) Oral daily  dextrose 5%. 1000 milliLiter(s) (50 mL/Hr) IV Continuous <Continuous>  dextrose 5%. 1000 milliLiter(s) (100 mL/Hr) IV Continuous <Continuous>  dextrose 50% Injectable 25 Gram(s) IV Push once  dextrose 50% Injectable 12.5 Gram(s) IV Push once  dextrose 50% Injectable 25 Gram(s) IV Push once  glucagon  Injectable 1 milliGRAM(s) IntraMuscular once  insulin lispro (ADMELOG) corrective regimen sliding scale   SubCutaneous three times a day before meals  lactated ringers. 1000 milliLiter(s) (100 mL/Hr) IV Continuous <Continuous>      Allergies    No Known Drug Allergies  [place] (Rash)    Intolerances        SOCIAL HISTORY:  Denies ETOh,Smoking,     FAMILY HISTORY:  No pertinent family history in first degree relatives        REVIEW OF SYSTEMS:    CONSTITUTIONAL: No weakness, fevers or chills                                                                                       CAPILLARY BLOOD GLUCOSE      POCT Blood Glucose.: 142 mg/dL (20 Mar 2023 11:28)  POCT Blood Glucose.: 107 mg/dL (20 Mar 2023 07:21)  POCT Blood Glucose.: 149 mg/dL (19 Mar 2023 22:02)  POCT Blood Glucose.: 200 mg/dL (19 Mar 2023 16:26)      Vital Signs Last 24 Hrs  T(C): 37.2 (20 Mar 2023 09:36), Max: 37.2 (20 Mar 2023 09:36)  T(F): 98.9 (20 Mar 2023 09:36), Max: 98.9 (20 Mar 2023 09:36)  HR: 65 (20 Mar 2023 09:36) (65 - 72)  BP: 124/61 (20 Mar 2023 09:36) (103/92 - 145/66)  BP(mean): --  RR: 17 (20 Mar 2023 09:36) (17 - 17)  SpO2: 97% (20 Mar 2023 09:36) (97% - 100%)    Parameters below as of 20 Mar 2023 09:36  Patient On (Oxygen Delivery Method): room air                                            PHYSICAL EXAM:    Constitutional: NAD  HEENT: conjunctive   clear   Neck:  No JVD  Respiratory: CTAB  Cardiovascular: S1 and S2  Gastrointestinal: BS+, soft, NT/ND  Extremities: No peripheral edema ,  Prior right 3rd digit amputation.S/P BKA (below knee amputation) bilateral  Access: pos fistula

## 2023-03-20 NOTE — PROGRESS NOTE ADULT - ASSESSMENT
This is a 46yo Moroccan speaking male with PMH of DM2, b/l BKA, ESRD (on HD MWF) well known to Orthopedics for treatment of right third finger 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 Sin/Dr. Singh) who was seen by Dr. Sin today in the office and sent in to [facility] for urgent irrigation and debridement. Patient states he missed dialysis today because he was instructed to come straight to the ED.     PAST MEDICAL & SURGICAL HISTORY:  ESRD on dialysis  H/O hypotension  Diabetes mellitus  S/P BKA (below knee amputation) bilateral  AV fistula (06 Mar 2023 17:32)            esrd on hd   will plan for hd as order    Excess fluids and waste products will be removed from your blood; your electrolytes will be balanced; your blood pressure will be controlled.      ANEMIA PLAN:  Anemia of chronic disease:  Well controlled by Aranesp  H and H subtherapeutic .  We will continue Aranesp aiming for a HCT of 32-36 %.   We will monitor Iron stores, B12 and RBC folate .      ,send blood and urine cx,serial lactate levels,monitor vitals closley,ivfs hydration,monitor urine output and renal profile,iv abx

## 2023-03-20 NOTE — PROGRESS NOTE ADULT - SUBJECTIVE AND OBJECTIVE BOX
[facility] Division of Hospital Medicine  Job Grimaldo MD  Pager (M-F, 8A-5P): [phone]  Other Times:  [phone]    Patient is a 47y old  Male who presents with a chief complaint of Right forearm gas gangrene (20 Mar 2023 13:49)    SUBJECTIVE / OVERNIGHT EVENTS:  Offers no new complaints. No F/C, N/V, CP, SOB, Cough, lightheadedness, dizziness, abdominal pain, diarrhea, dysuria.    MEDICATIONS  (STANDING):  acetaminophen     Tablet .. 975 milliGRAM(s) Oral every 8 hours  ampicillin/sulbactam  IVPB      ampicillin/sulbactam  IVPB 3 Gram(s) IV Intermittent every 24 hours  aspirin  chewable 81 milliGRAM(s) Oral daily  chlorhexidine 2% Cloths 1 Application(s) Topical daily  collagenase Ointment 1 Application(s) Topical two times a day  dextrose 5%. 1000 milliLiter(s) (50 mL/Hr) IV Continuous <Continuous>  dextrose 5%. 1000 milliLiter(s) (100 mL/Hr) IV Continuous <Continuous>  dextrose 50% Injectable 25 Gram(s) IV Push once  dextrose 50% Injectable 12.5 Gram(s) IV Push once  dextrose 50% Injectable 25 Gram(s) IV Push once  dorzolamide 2%/timolol 0.5% Ophthalmic Solution 1 Drop(s) Both EYES two times a day  glucagon  Injectable 1 milliGRAM(s) IntraMuscular once  insulin glargine Injectable (LANTUS) 4 Unit(s) SubCutaneous at bedtime  insulin lispro (ADMELOG) corrective regimen sliding scale   SubCutaneous at bedtime  insulin lispro (ADMELOG) corrective regimen sliding scale   SubCutaneous three times a day before meals  senna 2 Tablet(s) Oral at bedtime  sodium zirconium cyclosilicate 10 Gram(s) Oral daily    MEDICATIONS  (PRN):  dextrose Oral Gel 15 Gram(s) Oral once PRN Blood Glucose LESS THAN 70 milliGRAM(s)/deciliter  oxyCODONE    IR 5 milliGRAM(s) Oral every 4 hours PRN Moderate Pain (4 - 6)  oxyCODONE    IR 10 milliGRAM(s) Oral every 4 hours PRN Severe Pain (7 - 10)      Vital Signs Last 24 Hrs  T(C): 36.8 (20 Mar 2023 13:30), Max: 37.2 (20 Mar 2023 09:36)  T(F): 98.2 (20 Mar 2023 13:30), Max: 98.9 (20 Mar 2023 09:36)  HR: 69 (20 Mar 2023 13:30) (65 - 72)  BP: 132/66 (20 Mar 2023 13:30) (103/92 - 145/66)  BP(mean): --  RR: 18 (20 Mar 2023 13:30) (17 - 18)  SpO2: 97% (20 Mar 2023 13:30) (97% - 100%)    Parameters below as of 20 Mar 2023 13:30  Patient On (Oxygen Delivery Method): room air      CAPILLARY BLOOD GLUCOSE      POCT Blood Glucose.: 142 mg/dL (20 Mar 2023 11:28)  POCT Blood Glucose.: 107 mg/dL (20 Mar 2023 07:21)  POCT Blood Glucose.: 149 mg/dL (19 Mar 2023 22:02)  POCT Blood Glucose.: 200 mg/dL (19 Mar 2023 16:26)    I&O's Summary    19 Mar 2023 07:01  -  20 Mar 2023 07:00  --------------------------------------------------------  IN: 0 mL / OUT: 75 mL / NET: -75 mL        PHYSICAL EXAM:  CONSTITUTIONAL: NAD  EYES: PERRLA; conjunctiva and sclera clear  ENMT: Moist oral mucosa, no pharyngeal injection or exudates; normal dentition  NECK: Supple, no palpable masses; no thyromegaly  RESPIRATORY: Normal respiratory effort; lungs are clear to auscultation bilaterally  CARDIOVASCULAR: Regular rate and rhythm, normal S1 and S2, no murmur/rub/gallop; No lower extremity edema; Peripheral pulses are 2+ bilaterally  ABDOMEN: Nontender to palpation, normoactive bowel sounds, no rebound/guarding; No hepatosplenomegaly  MUSCULOSKELETAL:  Did not assess gait; no clubbing or cyanosis of digits; B/L BKA  PSYCH: A+O to person, place, and time; affect appropriate  NEUROLOGY: CN 2-12 are intact and symmetric; no gross sensory deficits   SKIN: Wound VAC in place    LABS:                    RADIOLOGY & ADDITIONAL TESTS:    Imaging Personally Reviewed:    Care Discussed with Consultants/Other Providers:

## 2023-03-21 LAB
ANION GAP SERPL CALC-SCNC: 19 MMOL/L — HIGH (ref 7–14)
BUN SERPL-MCNC: 42 MG/DL — HIGH (ref 7–23)
CALCIUM SERPL-MCNC: 8.7 MG/DL — SIGNIFICANT CHANGE UP (ref 8.4–10.5)
CHLORIDE SERPL-SCNC: 94 MMOL/L — LOW (ref 98–107)
CO2 SERPL-SCNC: 25 MMOL/L — SIGNIFICANT CHANGE UP (ref 22–31)
CREAT SERPL-MCNC: 5.72 MG/DL — HIGH (ref 0.5–1.3)
EGFR: 11 ML/MIN/1.73M2 — LOW
GLUCOSE BLDC GLUCOMTR-MCNC: 106 MG/DL — HIGH (ref 70–99)
GLUCOSE BLDC GLUCOMTR-MCNC: 110 MG/DL — HIGH (ref 70–99)
GLUCOSE BLDC GLUCOMTR-MCNC: 155 MG/DL — HIGH (ref 70–99)
GLUCOSE BLDC GLUCOMTR-MCNC: 221 MG/DL — HIGH (ref 70–99)
GLUCOSE SERPL-MCNC: 89 MG/DL — SIGNIFICANT CHANGE UP (ref 70–99)
HCT VFR BLD CALC: 30.3 % — LOW (ref 39–50)
HGB BLD-MCNC: 9.3 G/DL — LOW (ref 13–17)
MCHC RBC-ENTMCNC: 26.7 PG — LOW (ref 27–34)
MCHC RBC-ENTMCNC: 30.7 GM/DL — LOW (ref 32–36)
MCV RBC AUTO: 87.1 FL — SIGNIFICANT CHANGE UP (ref 80–100)
NRBC # BLD: 0 /100 WBCS — SIGNIFICANT CHANGE UP (ref 0–0)
NRBC # FLD: 0 K/UL — SIGNIFICANT CHANGE UP (ref 0–0)
PLATELET # BLD AUTO: 380 K/UL — SIGNIFICANT CHANGE UP (ref 150–400)
POTASSIUM SERPL-MCNC: 4.5 MMOL/L — SIGNIFICANT CHANGE UP (ref 3.5–5.3)
POTASSIUM SERPL-SCNC: 4.5 MMOL/L — SIGNIFICANT CHANGE UP (ref 3.5–5.3)
RBC # BLD: 3.48 M/UL — LOW (ref 4.2–5.8)
RBC # FLD: 15.3 % — HIGH (ref 10.3–14.5)
SODIUM SERPL-SCNC: 138 MMOL/L — SIGNIFICANT CHANGE UP (ref 135–145)
WBC # BLD: 5.98 K/UL — SIGNIFICANT CHANGE UP (ref 3.8–10.5)
WBC # FLD AUTO: 5.98 K/UL — SIGNIFICANT CHANGE UP (ref 3.8–10.5)

## 2023-03-21 PROCEDURE — 99233 SBSQ HOSP IP/OBS HIGH 50: CPT

## 2023-03-21 RX ADMIN — INSULIN GLARGINE 4 UNIT(S): 100 INJECTION, SOLUTION SUBCUTANEOUS at 22:02

## 2023-03-21 RX ADMIN — OXYCODONE HYDROCHLORIDE 10 MILLIGRAM(S): 5 TABLET ORAL at 22:02

## 2023-03-21 RX ADMIN — Medication 2: at 16:52

## 2023-03-21 RX ADMIN — OXYCODONE HYDROCHLORIDE 10 MILLIGRAM(S): 5 TABLET ORAL at 22:46

## 2023-03-21 RX ADMIN — Medication 975 MILLIGRAM(S): at 16:59

## 2023-03-21 RX ADMIN — Medication 975 MILLIGRAM(S): at 06:15

## 2023-03-21 RX ADMIN — OXYCODONE HYDROCHLORIDE 10 MILLIGRAM(S): 5 TABLET ORAL at 01:14

## 2023-03-21 RX ADMIN — SODIUM ZIRCONIUM CYCLOSILICATE 10 GRAM(S): 10 POWDER, FOR SUSPENSION ORAL at 11:31

## 2023-03-21 RX ADMIN — Medication 975 MILLIGRAM(S): at 00:20

## 2023-03-21 RX ADMIN — OXYCODONE HYDROCHLORIDE 10 MILLIGRAM(S): 5 TABLET ORAL at 00:20

## 2023-03-21 RX ADMIN — Medication 975 MILLIGRAM(S): at 22:46

## 2023-03-21 RX ADMIN — DORZOLAMIDE HYDROCHLORIDE TIMOLOL MALEATE 1 DROP(S): 20; 5 SOLUTION/ DROPS OPHTHALMIC at 18:58

## 2023-03-21 RX ADMIN — Medication 975 MILLIGRAM(S): at 22:01

## 2023-03-21 RX ADMIN — Medication 1 APPLICATION(S): at 18:59

## 2023-03-21 RX ADMIN — DORZOLAMIDE HYDROCHLORIDE TIMOLOL MALEATE 1 DROP(S): 20; 5 SOLUTION/ DROPS OPHTHALMIC at 05:15

## 2023-03-21 RX ADMIN — Medication 975 MILLIGRAM(S): at 05:15

## 2023-03-21 RX ADMIN — Medication 1 APPLICATION(S): at 05:16

## 2023-03-21 RX ADMIN — CHLORHEXIDINE GLUCONATE 1 APPLICATION(S): 213 SOLUTION TOPICAL at 11:31

## 2023-03-21 RX ADMIN — Medication 81 MILLIGRAM(S): at 11:31

## 2023-03-21 RX ADMIN — Medication 975 MILLIGRAM(S): at 16:58

## 2023-03-21 RX ADMIN — AMPICILLIN SODIUM AND SULBACTAM SODIUM 200 GRAM(S): 250; 125 INJECTION, POWDER, FOR SUSPENSION INTRAMUSCULAR; INTRAVENOUS at 11:30

## 2023-03-21 NOTE — PROGRESS NOTE ADULT - SUBJECTIVE AND OBJECTIVE BOX
Patient seen and examined at bedside. Reports no acute complaints at this time. Pain is well controlled. No acute events overnight.    ICU Vital Signs Last 24 Hrs  T(C): 36.8 (21 Mar 2023 01:59), Max: 37.2 (20 Mar 2023 09:36)  T(F): 98.3 (21 Mar 2023 01:59), Max: 98.9 (20 Mar 2023 09:36)  HR: 74 (21 Mar 2023 01:59) (64 - 74)  BP: 149/53 (21 Mar 2023 01:59) (124/61 - 178/89)  BP(mean): --  ABP: --  ABP(mean): --  RR: 16 (21 Mar 2023 01:59) (16 - 18)  SpO2: 99% (21 Mar 2023 01:59) (97% - 100%)    O2 Parameters below as of 21 Mar 2023 01:59  Patient On (Oxygen Delivery Method): room air                              8.7    8.22  )-----------( 393      ( 20 Mar 2023 20:40 )             28.2   03-20    138  |  98  |  83<H>  ----------------------------<  117<H>  6.0<H>   |  16<L>  |  9.54<H>    Ca    8.3<L>      20 Mar 2023 20:40        PHYSICAL EXAM:    Physical exam:  Gen: Alert and Oriented x3, No Acute Distress  Right upper extremity : dry dressing c/d/i, cling, wwp  Distally perfused            A/P: 46 yo M, Stable, s/p d/c vac yesterday with dry dressing placed, pain controlled, doing well     DVT prophylaxis- ASA daily  Unasyn per ID  Follow up AM labs - HD  Pain control    Incentive spirometer  Continue abx    Patient seen and examined at bedside. Reports no acute complaints at this time. Pain is well controlled. No acute events overnight.    ICU Vital Signs Last 24 Hrs  T(C): 36.8 (21 Mar 2023 01:59), Max: 37.2 (20 Mar 2023 09:36)  T(F): 98.3 (21 Mar 2023 01:59), Max: 98.9 (20 Mar 2023 09:36)  HR: 74 (21 Mar 2023 01:59) (64 - 74)  BP: 149/53 (21 Mar 2023 01:59) (124/61 - 178/89)  BP(mean): --  ABP: --  ABP(mean): --  RR: 16 (21 Mar 2023 01:59) (16 - 18)  SpO2: 99% (21 Mar 2023 01:59) (97% - 100%)    O2 Parameters below as of 21 Mar 2023 01:59  Patient On (Oxygen Delivery Method): room air                              8.7    8.22  )-----------( 393      ( 20 Mar 2023 20:40 )             28.2   03-20    138  |  98  |  83<H>  ----------------------------<  117<H>  6.0<H>   |  16<L>  |  9.54<H>    Ca    8.3<L>      20 Mar 2023 20:40        PHYSICAL EXAM:    Physical exam:  Gen: Alert and Oriented x3, No Acute Distress  Right upper extremity : dry dressing c/d/i, cling, wwp  Distally perfused            A/P: 46 yo M, Stable, s/p d/c vac yesterday with dry dressing placed, pain controlled, doing well  FU AM potassium    DVT prophylaxis- ASA daily  Unasyn per ID  Follow up AM labs - HD  Pain control    Incentive spirometer  Continue abx

## 2023-03-21 NOTE — CHART NOTE - NSCHARTNOTEFT_GEN_A_CORE
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     Patient with good PO intake of meals, completing >75% per nursing flowsheets.  No chewing or swallowing difficulties reported. No GI distress reported i.e. nausea, vomiting, diarrhea. Food allergy noted to turkey per chart.  Diet being optimized with Nepro 2x daily (850 kcals, 38.2g protein). Diet education previously provided and patient noted to exhibit teachback of renal diet.      Diet, Consistent Carbohydrate Renal w/Evening Snack:   Supplement Feeding Modality:  Oral  Nepro Cans or Servings Per Day:  1       Frequency:  Two Times a day (03-07-23 @ 00:17)    GI: WDL. Last BM noted on [ 3/18 ]    Anthropometrics:   Height (cm): 170.2 (03-07)  Weight Hx - 3/21 - 71kg      3/20 - 68kg      3/16 - 66.8kg      3/15 - 67.4kg  Adjusted BW for B/L BKAs - 59.3kg    Edema: 1+ edema to rt hand     Pressure Injuries: None noted    _______________ Pertinent Medications_______________  MEDICATIONS  (STANDING):  acetaminophen     Tablet .. 975 milliGRAM(s) Oral every 8 hours  ampicillin/sulbactam  IVPB      ampicillin/sulbactam  IVPB 3 Gram(s) IV Intermittent every 24 hours  aspirin  chewable 81 milliGRAM(s) Oral daily  chlorhexidine 2% Cloths 1 Application(s) Topical daily  collagenase Ointment 1 Application(s) Topical two times a day  dextrose 5%. 1000 milliLiter(s) (100 mL/Hr) IV Continuous <Continuous>  dextrose 5%. 1000 milliLiter(s) (50 mL/Hr) IV Continuous <Continuous>  dextrose 50% Injectable 25 Gram(s) IV Push once  dextrose 50% Injectable 12.5 Gram(s) IV Push once  dextrose 50% Injectable 25 Gram(s) IV Push once  dorzolamide 2%/timolol 0.5% Ophthalmic Solution 1 Drop(s) Both EYES two times a day  glucagon  Injectable 1 milliGRAM(s) IntraMuscular once  insulin glargine Injectable (LANTUS) 4 Unit(s) SubCutaneous at bedtime  insulin lispro (ADMELOG) corrective regimen sliding scale   SubCutaneous at bedtime  insulin lispro (ADMELOG) corrective regimen sliding scale   SubCutaneous three times a day before meals  senna 2 Tablet(s) Oral at bedtime  sodium zirconium cyclosilicate 10 Gram(s) Oral daily    MEDICATIONS  (PRN):  dextrose Oral Gel 15 Gram(s) Oral once PRN Blood Glucose LESS THAN 70 milliGRAM(s)/deciliter  oxyCODONE    IR 5 milliGRAM(s) Oral every 4 hours PRN Moderate Pain (4 - 6)  oxyCODONE    IR 10 milliGRAM(s) Oral every 4 hours PRN Severe Pain (7 - 10)      __________________ Pertinent Labs__________________   03-21 Na138 mmol/L Glu 89 mg/dL K+ 4.5 mmol/L Cr  5.72 mg/dL<H> BUN 42 mg/dL<H> 03-16 Phos 5.5 mg/dL<H> 02-22 Chol 149 mg/dL LDL --    HDL 43 mg/dL Trig 112 mg/dL    POCT Blood Glucose.: 106 mg/dL (03-21-23 @ 11:26)  POCT Blood Glucose.: 110 mg/dL (03-21-23 @ 07:16)  POCT Blood Glucose.: 159 mg/dL (03-20-23 @ 22:52)  POCT Blood Glucose.: 127 mg/dL (03-20-23 @ 16:39)  POCT Blood Glucose.: 142 mg/dL (03-20-23 @ 11:28)  POCT Blood Glucose.: 107 mg/dL (03-20-23 @ 07:21)  POCT Blood Glucose.: 149 mg/dL (03-19-23 @ 22:02)  POCT Blood Glucose.: 200 mg/dL (03-19-23 @ 16:26)  POCT Blood Glucose.: 125 mg/dL (03-19-23 @ 11:14)  POCT Blood Glucose.: 156 mg/dL (03-19-23 @ 07:26)  POCT Blood Glucose.: 259 mg/dL (03-18-23 @ 21:44)  POCT Blood Glucose.: 136 mg/dL (03-18-23 @ 16:23)    Estimated Needs:   [ ] no change since previous assessment  [X] recalculated: 59.3kg adjusted BW for B/L BKAs  30-35kcal/os=2873-3194jgfu  1.2-1.5gm/kg/pro=71-89gm pro    Previous Nutrition Diagnosis: Increased Nutrient Needs - kcal, protein     Nutrition Diagnosis is [X] ongoing  [ ] resolved [ ] not applicable     New Nutrition Diagnosis: n/a    Monitoring and Evaluation:     [ x ] Tolerance to diet prescription / adequacy of meal intake  [ x ] Weight trends   [ x ] Pertinent labs    Recommendations:  1) Diet / Supplement Changes: Continue diet as ordered - renal, CSTCHO with evening snack  2) Obtain and record current weights to best monitor for acute changes in nutritional status.  3) Please consistently document % meal intake in nursing flowsheets.   4) Consider phosphorus binder with meals if medically appropropriate.    Rubi Spence, MS, RDN, CDN  Pager [phone]  Also available on MS Teams

## 2023-03-21 NOTE — PROGRESS NOTE ADULT - PROBLEM SELECTOR PLAN 4
-f/u renal recs and plans for HD   -renally dose medications   -anemia of renal disease, c/w epo during HD as per renal  -renal restricted diet w/ nepro supplements  - K normal after HD.

## 2023-03-21 NOTE — PROGRESS NOTE ADULT - ASSESSMENT
This is a 46yo Mauritian speaking male with PMH of DM2, b/l BKA, ESRD (on HD MWF) well known to Orthopedics for treatment of right third finger 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 Sin/Dr. Singh) who was seen by Dr. Sin today in the office and sent in to [facility] for urgent irrigation and debridement. Patient states he missed dialysis today because he was instructed to come straight to the ED.     PAST MEDICAL & SURGICAL HISTORY:  ESRD on dialysis  H/O hypotension  Diabetes mellitus  S/P BKA (below knee amputation) bilateral  AV fistula (06 Mar 2023 17:32)            esrd on hd   will plan for hd as order    Excess fluids and waste products will be removed from your blood; your electrolytes will be balanced; your blood pressure will be controlled.      ANEMIA PLAN:  Anemia of chronic disease:  We will continue epo aiming for a HCT of 32-36 %.   We will monitor Iron stores, B12 and RBC folate .      ,send blood and urine cx,serial lactate levels,monitor vitals skyla sanchez hydration,monitor urine output and renal profile,iv abx

## 2023-03-21 NOTE — PROGRESS NOTE ADULT - SUBJECTIVE AND OBJECTIVE BOX
Patient is a 47y Male whom presented to the hospital with esrd on hd    PAST MEDICAL & SURGICAL HISTORY:  ESRD on dialysis      H/O hypotension      Diabetes mellitus      S/P BKA (below knee amputation) bilateral      AV fistula          MEDICATIONS  (STANDING):  acetaminophen     Tablet .. 975 milliGRAM(s) Oral every 8 hours  ampicillin/sulbactam  IVPB      aspirin  chewable 81 milliGRAM(s) Oral daily  dextrose 5%. 1000 milliLiter(s) (50 mL/Hr) IV Continuous <Continuous>  dextrose 5%. 1000 milliLiter(s) (100 mL/Hr) IV Continuous <Continuous>  dextrose 50% Injectable 25 Gram(s) IV Push once  dextrose 50% Injectable 12.5 Gram(s) IV Push once  dextrose 50% Injectable 25 Gram(s) IV Push once  glucagon  Injectable 1 milliGRAM(s) IntraMuscular once  insulin lispro (ADMELOG) corrective regimen sliding scale   SubCutaneous three times a day before meals  lactated ringers. 1000 milliLiter(s) (100 mL/Hr) IV Continuous <Continuous>      Allergies    No Known Drug Allergies  [place] (Rash)    Intolerances        SOCIAL HISTORY:  Denies ETOh,Smoking,     FAMILY HISTORY:  No pertinent family history in first degree relatives        REVIEW OF SYSTEMS:    CONSTITUTIONAL: No weakness, fevers or chills                                                                                                       9.3    5.98  )-----------( 380      ( 21 Mar 2023 10:50 )             30.3       CBC Full  -  ( 21 Mar 2023 10:50 )  WBC Count : 5.98 K/uL  RBC Count : 3.48 M/uL  Hemoglobin : 9.3 g/dL  Hematocrit : 30.3 %  Platelet Count - Automated : 380 K/uL  Mean Cell Volume : 87.1 fL  Mean Cell Hemoglobin : 26.7 pg  Mean Cell Hemoglobin Concentration : 30.7 gm/dL  Auto Neutrophil # : x  Auto Lymphocyte # : x  Auto Monocyte # : x  Auto Eosinophil # : x  Auto Basophil # : x  Auto Neutrophil % : x  Auto Lymphocyte % : x  Auto Monocyte % : x  Auto Eosinophil % : x  Auto Basophil % : x      03-21    138  |  94<L>  |  42<H>  ----------------------------<  89  4.5   |  25  |  5.72<H>    Ca    8.7      21 Mar 2023 10:50        CAPILLARY BLOOD GLUCOSE      POCT Blood Glucose.: 106 mg/dL (21 Mar 2023 11:26)  POCT Blood Glucose.: 110 mg/dL (21 Mar 2023 07:16)  POCT Blood Glucose.: 159 mg/dL (20 Mar 2023 22:52)  POCT Blood Glucose.: 127 mg/dL (20 Mar 2023 16:39)      Vital Signs Last 24 Hrs  T(C): 36.7 (21 Mar 2023 09:32), Max: 36.8 (20 Mar 2023 13:30)  T(F): 98.1 (21 Mar 2023 09:32), Max: 98.3 (21 Mar 2023 01:59)  HR: 67 (21 Mar 2023 09:32) (64 - 74)  BP: 144/67 (21 Mar 2023 09:32) (132/66 - 178/89)  BP(mean): --  RR: 17 (21 Mar 2023 09:32) (16 - 18)  SpO2: 100% (21 Mar 2023 09:32) (97% - 100%)    Parameters below as of 21 Mar 2023 09:32  Patient On (Oxygen Delivery Method): room air                                                        PHYSICAL EXAM:    Constitutional: NAD  HEENT: conjunctive   clear   Neck:  No JVD  Respiratory: CTAB  Cardiovascular: S1 and S2  Gastrointestinal: BS+, soft, NT/ND  Extremities: No peripheral edema ,  Prior right 3rd digit amputation.S/P BKA (below knee amputation) bilateral  Access: pos fistula

## 2023-03-21 NOTE — PROGRESS NOTE ADULT - PROBLEM SELECTOR PLAN 5
-diabetic retinopathy.  severe proliferative diabetic retinopathy, previously on cosopt, optho outpt follow up   -AiY6o=1.5%. FS better controlled in-house.  -c/w ISS for now and continue to monitor FS closely.  -patient on 4 units premeals and 5 units at bedtime  - c/w lantus 4 and ss. will titrate up as necessary  -c/w ASA daily

## 2023-03-21 NOTE — PROGRESS NOTE ADULT - SUBJECTIVE AND OBJECTIVE BOX
[facility] Division of Hospital Medicine  Job Grimaldo MD  Pager (M-F, 8A-5P): [phone]  Other Times:  [phone]    Patient is a 47y old  Male who presents with a chief complaint of Right forearm gas gangrene (21 Mar 2023 12:27)    SUBJECTIVE / OVERNIGHT EVENTS:  Patient offers no new complaints.  Off wound VAC. No F/C, N/V, CP, SOB, Cough, lightheadedness, dizziness, abdominal pain, diarrhea, dysuria.    MEDICATIONS  (STANDING):  acetaminophen     Tablet .. 975 milliGRAM(s) Oral every 8 hours  ampicillin/sulbactam  IVPB      ampicillin/sulbactam  IVPB 3 Gram(s) IV Intermittent every 24 hours  aspirin  chewable 81 milliGRAM(s) Oral daily  chlorhexidine 2% Cloths 1 Application(s) Topical daily  collagenase Ointment 1 Application(s) Topical two times a day  dextrose 5%. 1000 milliLiter(s) (100 mL/Hr) IV Continuous <Continuous>  dextrose 5%. 1000 milliLiter(s) (50 mL/Hr) IV Continuous <Continuous>  dextrose 50% Injectable 25 Gram(s) IV Push once  dextrose 50% Injectable 12.5 Gram(s) IV Push once  dextrose 50% Injectable 25 Gram(s) IV Push once  dorzolamide 2%/timolol 0.5% Ophthalmic Solution 1 Drop(s) Both EYES two times a day  glucagon  Injectable 1 milliGRAM(s) IntraMuscular once  insulin glargine Injectable (LANTUS) 4 Unit(s) SubCutaneous at bedtime  insulin lispro (ADMELOG) corrective regimen sliding scale   SubCutaneous at bedtime  insulin lispro (ADMELOG) corrective regimen sliding scale   SubCutaneous three times a day before meals  senna 2 Tablet(s) Oral at bedtime  sodium zirconium cyclosilicate 10 Gram(s) Oral daily    MEDICATIONS  (PRN):  dextrose Oral Gel 15 Gram(s) Oral once PRN Blood Glucose LESS THAN 70 milliGRAM(s)/deciliter  oxyCODONE    IR 5 milliGRAM(s) Oral every 4 hours PRN Moderate Pain (4 - 6)  oxyCODONE    IR 10 milliGRAM(s) Oral every 4 hours PRN Severe Pain (7 - 10)      Vital Signs Last 24 Hrs  T(C): 36.7 (21 Mar 2023 09:32), Max: 36.8 (20 Mar 2023 21:30)  T(F): 98.1 (21 Mar 2023 09:32), Max: 98.3 (21 Mar 2023 01:59)  HR: 67 (21 Mar 2023 09:32) (64 - 74)  BP: 144/67 (21 Mar 2023 09:32) (144/67 - 178/89)  BP(mean): --  RR: 17 (21 Mar 2023 09:32) (16 - 18)  SpO2: 100% (21 Mar 2023 09:32) (99% - 100%)    Parameters below as of 21 Mar 2023 09:32  Patient On (Oxygen Delivery Method): room air      CAPILLARY BLOOD GLUCOSE      POCT Blood Glucose.: 106 mg/dL (21 Mar 2023 11:26)  POCT Blood Glucose.: 110 mg/dL (21 Mar 2023 07:16)  POCT Blood Glucose.: 159 mg/dL (20 Mar 2023 22:52)  POCT Blood Glucose.: 127 mg/dL (20 Mar 2023 16:39)    I&O's Summary    20 Mar 2023 07:01  -  21 Mar 2023 07:00  --------------------------------------------------------  IN: 400 mL / OUT: 1900 mL / NET: -1500 mL        PHYSICAL EXAM:  CONSTITUTIONAL: NAD  EYES: PERRLA; conjunctiva and sclera clear  ENMT: Moist oral mucosa, no pharyngeal injection or exudates; normal dentition  NECK: Supple, no palpable masses; no thyromegaly  RESPIRATORY: Normal respiratory effort; lungs are clear to auscultation bilaterally  CARDIOVASCULAR: Regular rate and rhythm, normal S1 and S2, no murmur/rub/gallop; No lower extremity edema; Peripheral pulses are 2+ bilaterally  ABDOMEN: Nontender to palpation, normoactive bowel sounds, no rebound/guarding; No hepatosplenomegaly  MUSCULOSKELETAL:  Did not assess gait; no clubbing or cyanosis of digits; B/L BKA  PSYCH: A+O to person, place, and time; affect appropriate  NEUROLOGY: CN 2-12 are intact and symmetric; no gross sensory deficits   SKIN: Wound VAC in place    LABS:                        9.3    5.98  )-----------( 380      ( 21 Mar 2023 10:50 )             30.3     03-21    138  |  94<L>  |  42<H>  ----------------------------<  89  4.5   |  25  |  5.72<H>    Ca    8.7      21 Mar 2023 10:50                RADIOLOGY & ADDITIONAL TESTS:    Imaging Personally Reviewed:    Care Discussed with Consultants/Other Providers:

## 2023-03-22 LAB
ANION GAP SERPL CALC-SCNC: 22 MMOL/L — HIGH (ref 7–14)
BASOPHILS # BLD AUTO: 0.07 K/UL — SIGNIFICANT CHANGE UP (ref 0–0.2)
BASOPHILS NFR BLD AUTO: 0.9 % — SIGNIFICANT CHANGE UP (ref 0–2)
BUN SERPL-MCNC: 65 MG/DL — HIGH (ref 7–23)
CALCIUM SERPL-MCNC: 7.8 MG/DL — LOW (ref 8.4–10.5)
CHLORIDE SERPL-SCNC: 97 MMOL/L — LOW (ref 98–107)
CO2 SERPL-SCNC: 21 MMOL/L — LOW (ref 22–31)
CREAT SERPL-MCNC: 8.17 MG/DL — HIGH (ref 0.5–1.3)
EGFR: 7 ML/MIN/1.73M2 — LOW
EOSINOPHIL # BLD AUTO: 0.44 K/UL — SIGNIFICANT CHANGE UP (ref 0–0.5)
EOSINOPHIL NFR BLD AUTO: 5.7 % — SIGNIFICANT CHANGE UP (ref 0–6)
GLUCOSE BLDC GLUCOMTR-MCNC: 142 MG/DL — HIGH (ref 70–99)
GLUCOSE BLDC GLUCOMTR-MCNC: 149 MG/DL — HIGH (ref 70–99)
GLUCOSE BLDC GLUCOMTR-MCNC: 150 MG/DL — HIGH (ref 70–99)
GLUCOSE BLDC GLUCOMTR-MCNC: 159 MG/DL — HIGH (ref 70–99)
GLUCOSE BLDC GLUCOMTR-MCNC: 73 MG/DL — SIGNIFICANT CHANGE UP (ref 70–99)
GLUCOSE SERPL-MCNC: 167 MG/DL — HIGH (ref 70–99)
HCT VFR BLD CALC: 27.4 % — LOW (ref 39–50)
HGB BLD-MCNC: 8.4 G/DL — LOW (ref 13–17)
IANC: 4.11 K/UL — SIGNIFICANT CHANGE UP (ref 1.8–7.4)
IMM GRANULOCYTES NFR BLD AUTO: 0.5 % — SIGNIFICANT CHANGE UP (ref 0–0.9)
LYMPHOCYTES # BLD AUTO: 2.42 K/UL — SIGNIFICANT CHANGE UP (ref 1–3.3)
LYMPHOCYTES # BLD AUTO: 31.5 % — SIGNIFICANT CHANGE UP (ref 13–44)
MCHC RBC-ENTMCNC: 26.8 PG — LOW (ref 27–34)
MCHC RBC-ENTMCNC: 30.7 GM/DL — LOW (ref 32–36)
MCV RBC AUTO: 87.3 FL — SIGNIFICANT CHANGE UP (ref 80–100)
MONOCYTES # BLD AUTO: 0.61 K/UL — SIGNIFICANT CHANGE UP (ref 0–0.9)
MONOCYTES NFR BLD AUTO: 7.9 % — SIGNIFICANT CHANGE UP (ref 2–14)
NEUTROPHILS # BLD AUTO: 4.11 K/UL — SIGNIFICANT CHANGE UP (ref 1.8–7.4)
NEUTROPHILS NFR BLD AUTO: 53.5 % — SIGNIFICANT CHANGE UP (ref 43–77)
NRBC # BLD: 0 /100 WBCS — SIGNIFICANT CHANGE UP (ref 0–0)
NRBC # FLD: 0 K/UL — SIGNIFICANT CHANGE UP (ref 0–0)
PLATELET # BLD AUTO: 345 K/UL — SIGNIFICANT CHANGE UP (ref 150–400)
POTASSIUM SERPL-MCNC: 4.8 MMOL/L — SIGNIFICANT CHANGE UP (ref 3.5–5.3)
POTASSIUM SERPL-SCNC: 4.8 MMOL/L — SIGNIFICANT CHANGE UP (ref 3.5–5.3)
RBC # BLD: 3.14 M/UL — LOW (ref 4.2–5.8)
RBC # FLD: 15.3 % — HIGH (ref 10.3–14.5)
SODIUM SERPL-SCNC: 140 MMOL/L — SIGNIFICANT CHANGE UP (ref 135–145)
WBC # BLD: 7.69 K/UL — SIGNIFICANT CHANGE UP (ref 3.8–10.5)
WBC # FLD AUTO: 7.69 K/UL — SIGNIFICANT CHANGE UP (ref 3.8–10.5)

## 2023-03-22 RX ADMIN — OXYCODONE HYDROCHLORIDE 10 MILLIGRAM(S): 5 TABLET ORAL at 23:05

## 2023-03-22 RX ADMIN — SODIUM ZIRCONIUM CYCLOSILICATE 10 GRAM(S): 10 POWDER, FOR SUSPENSION ORAL at 11:39

## 2023-03-22 RX ADMIN — Medication 975 MILLIGRAM(S): at 14:26

## 2023-03-22 RX ADMIN — Medication 1 APPLICATION(S): at 22:22

## 2023-03-22 RX ADMIN — OXYCODONE HYDROCHLORIDE 10 MILLIGRAM(S): 5 TABLET ORAL at 22:18

## 2023-03-22 RX ADMIN — AMPICILLIN SODIUM AND SULBACTAM SODIUM 200 GRAM(S): 250; 125 INJECTION, POWDER, FOR SUSPENSION INTRAMUSCULAR; INTRAVENOUS at 11:00

## 2023-03-22 RX ADMIN — OXYCODONE HYDROCHLORIDE 10 MILLIGRAM(S): 5 TABLET ORAL at 15:54

## 2023-03-22 RX ADMIN — Medication 975 MILLIGRAM(S): at 22:18

## 2023-03-22 RX ADMIN — DORZOLAMIDE HYDROCHLORIDE TIMOLOL MALEATE 1 DROP(S): 20; 5 SOLUTION/ DROPS OPHTHALMIC at 06:22

## 2023-03-22 RX ADMIN — INSULIN GLARGINE 4 UNIT(S): 100 INJECTION, SOLUTION SUBCUTANEOUS at 22:18

## 2023-03-22 RX ADMIN — Medication 975 MILLIGRAM(S): at 07:08

## 2023-03-22 RX ADMIN — OXYCODONE HYDROCHLORIDE 10 MILLIGRAM(S): 5 TABLET ORAL at 07:08

## 2023-03-22 RX ADMIN — Medication 975 MILLIGRAM(S): at 15:00

## 2023-03-22 RX ADMIN — Medication 975 MILLIGRAM(S): at 06:23

## 2023-03-22 RX ADMIN — DORZOLAMIDE HYDROCHLORIDE TIMOLOL MALEATE 1 DROP(S): 20; 5 SOLUTION/ DROPS OPHTHALMIC at 22:18

## 2023-03-22 RX ADMIN — OXYCODONE HYDROCHLORIDE 10 MILLIGRAM(S): 5 TABLET ORAL at 16:40

## 2023-03-22 RX ADMIN — OXYCODONE HYDROCHLORIDE 10 MILLIGRAM(S): 5 TABLET ORAL at 06:23

## 2023-03-22 RX ADMIN — Medication 1 APPLICATION(S): at 06:23

## 2023-03-22 RX ADMIN — CHLORHEXIDINE GLUCONATE 1 APPLICATION(S): 213 SOLUTION TOPICAL at 11:39

## 2023-03-22 RX ADMIN — Medication 81 MILLIGRAM(S): at 11:39

## 2023-03-22 RX ADMIN — Medication 975 MILLIGRAM(S): at 23:05

## 2023-03-22 NOTE — PROGRESS NOTE ADULT - SUBJECTIVE AND OBJECTIVE BOX
Patient seen and examined at bedside. Reports no acute complaints at this time. Pain is well controlled. No acute events overnight.          PHYSICAL EXAM:    Vital Signs Last 24 Hrs  T(C): 36.8 (21 Mar 2023 21:51), Max: 36.9 (21 Mar 2023 18:03)  T(F): 98.2 (21 Mar 2023 21:51), Max: 98.4 (21 Mar 2023 18:03)  HR: 67 (21 Mar 2023 21:51) (67 - 72)  BP: 158/72 (21 Mar 2023 21:51) (134/68 - 158/72)  BP(mean): --  RR: 17 (21 Mar 2023 21:51) (17 - 17)  SpO2: 100% (21 Mar 2023 21:51) (100% - 100%)    Parameters below as of 21 Mar 2023 21:51  Patient On (Oxygen Delivery Method): room air      Physical exam:  Gen: Alert and Oriented x3, No Acute Distress  Right upper extremity : dry dressing c/d/i, cling, wwp  Distally perfused          A/P: 48 yo M sp multiple I&D of Right hand and forearm and integra. Stable now ei dry dressing placed and collagenase treatment.    DVT prophylaxis- ASA daily  Follow up AM labs - HD  Pain control   Incentive spirometer  Continue abx per ID    Orthopaedic Surgery  [facility] [place] [id number]  [facility]        [id number]  [facility]  [id number]/ [phone]

## 2023-03-22 NOTE — PROGRESS NOTE ADULT - ASSESSMENT
This is a 48yo Barbadian speaking male with PMH of DM2, b/l BKA, ESRD (on HD MWF) well known to Orthopedics for treatment of right third finger 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 Sin/Dr. Singh) who was seen by Dr. Sin today in the office and sent in to [facility] for urgent irrigation and debridement. Patient states he missed dialysis today because he was instructed to come straight to the ED.     PAST MEDICAL & SURGICAL HISTORY:  ESRD on dialysis  H/O hypotension  Diabetes mellitus  S/P BKA (below knee amputation) bilateral  AV fistula (06 Mar 2023 17:32)            esrd on hd   will plan for hd as order    Excess fluids and waste products will be removed from your blood; your electrolytes will be balanced; your blood pressure will be controlled.      ANEMIA PLAN:  Anemia of chronic disease:  We will continue epo aiming for a HCT of 32-36 %.   We will monitor Iron stores, B12 and RBC folate .      ,send blood and urine cx,serial lactate levels,monitor vitals skyla sanchez hydration,monitor urine output and renal profile,iv abx

## 2023-03-22 NOTE — PROGRESS NOTE ADULT - SUBJECTIVE AND OBJECTIVE BOX
Patient is a 47y Male whom presented to the hospital with esrd on hd    PAST MEDICAL & SURGICAL HISTORY:  ESRD on dialysis      H/O hypotension      Diabetes mellitus      S/P BKA (below knee amputation) bilateral      AV fistula          MEDICATIONS  (STANDING):  acetaminophen     Tablet .. 975 milliGRAM(s) Oral every 8 hours  ampicillin/sulbactam  IVPB      aspirin  chewable 81 milliGRAM(s) Oral daily  dextrose 5%. 1000 milliLiter(s) (50 mL/Hr) IV Continuous <Continuous>  dextrose 5%. 1000 milliLiter(s) (100 mL/Hr) IV Continuous <Continuous>  dextrose 50% Injectable 25 Gram(s) IV Push once  dextrose 50% Injectable 12.5 Gram(s) IV Push once  dextrose 50% Injectable 25 Gram(s) IV Push once  glucagon  Injectable 1 milliGRAM(s) IntraMuscular once  insulin lispro (ADMELOG) corrective regimen sliding scale   SubCutaneous three times a day before meals  lactated ringers. 1000 milliLiter(s) (100 mL/Hr) IV Continuous <Continuous>      Allergies    No Known Drug Allergies  [place] (Rash)    Intolerances        SOCIAL HISTORY:  Denies ETOh,Smoking,     FAMILY HISTORY:  No pertinent family history in first degree relatives        REVIEW OF SYSTEMS:    CONSTITUTIONAL: No weakness, fevers or chills                                                                                                                       8.4    7.69  )-----------( 345      ( 22 Mar 2023 17:41 )             27.4       CBC Full  -  ( 22 Mar 2023 17:41 )  WBC Count : 7.69 K/uL  RBC Count : 3.14 M/uL  Hemoglobin : 8.4 g/dL  Hematocrit : 27.4 %  Platelet Count - Automated : 345 K/uL  Mean Cell Volume : 87.3 fL  Mean Cell Hemoglobin : 26.8 pg  Mean Cell Hemoglobin Concentration : 30.7 gm/dL  Auto Neutrophil # : 4.11 K/uL  Auto Lymphocyte # : 2.42 K/uL  Auto Monocyte # : 0.61 K/uL  Auto Eosinophil # : 0.44 K/uL  Auto Basophil # : 0.07 K/uL  Auto Neutrophil % : 53.5 %  Auto Lymphocyte % : 31.5 %  Auto Monocyte % : 7.9 %  Auto Eosinophil % : 5.7 %  Auto Basophil % : 0.9 %      03-22    140  |  97<L>  |  65<H>  ----------------------------<  167<H>  4.8   |  21<L>  |  8.17<H>    Ca    7.8<L>      22 Mar 2023 17:41        CAPILLARY BLOOD GLUCOSE      POCT Blood Glucose.: 159 mg/dL (22 Mar 2023 18:59)  POCT Blood Glucose.: 142 mg/dL (22 Mar 2023 15:19)  POCT Blood Glucose.: 149 mg/dL (22 Mar 2023 11:19)  POCT Blood Glucose.: 73 mg/dL (22 Mar 2023 07:15)  POCT Blood Glucose.: 221 mg/dL (21 Mar 2023 21:26)      Vital Signs Last 24 Hrs  T(C): 36.7 (22 Mar 2023 16:35), Max: 36.8 (21 Mar 2023 21:51)  T(F): 98 (22 Mar 2023 16:35), Max: 98.2 (21 Mar 2023 21:51)  HR: 71 (22 Mar 2023 16:35) (63 - 71)  BP: 185/89 (22 Mar 2023 16:35) (138/75 - 185/89)  BP(mean): --  RR: 18 (22 Mar 2023 16:35) (17 - 18)  SpO2: 100% (22 Mar 2023 09:32) (100% - 100%)    Parameters below as of 22 Mar 2023 16:35  Patient On (Oxygen Delivery Method): room air                                                PHYSICAL EXAM:    Constitutional: NAD  HEENT: conjunctive   clear   Neck:  No JVD  Respiratory: CTAB  Cardiovascular: S1 and S2  Gastrointestinal: BS+, soft, NT/ND  Extremities: No peripheral edema ,  Prior right 3rd digit amputation.S/P BKA (below knee amputation) bilateral  Access: pos fistula

## 2023-03-23 LAB
GLUCOSE BLDC GLUCOMTR-MCNC: 112 MG/DL — HIGH (ref 70–99)
GLUCOSE BLDC GLUCOMTR-MCNC: 158 MG/DL — HIGH (ref 70–99)
GLUCOSE BLDC GLUCOMTR-MCNC: 163 MG/DL — HIGH (ref 70–99)
GLUCOSE BLDC GLUCOMTR-MCNC: 199 MG/DL — HIGH (ref 70–99)

## 2023-03-23 PROCEDURE — 99232 SBSQ HOSP IP/OBS MODERATE 35: CPT

## 2023-03-23 RX ADMIN — INSULIN GLARGINE 4 UNIT(S): 100 INJECTION, SOLUTION SUBCUTANEOUS at 21:23

## 2023-03-23 RX ADMIN — DORZOLAMIDE HYDROCHLORIDE TIMOLOL MALEATE 1 DROP(S): 20; 5 SOLUTION/ DROPS OPHTHALMIC at 06:04

## 2023-03-23 RX ADMIN — Medication 975 MILLIGRAM(S): at 23:23

## 2023-03-23 RX ADMIN — DORZOLAMIDE HYDROCHLORIDE TIMOLOL MALEATE 1 DROP(S): 20; 5 SOLUTION/ DROPS OPHTHALMIC at 17:43

## 2023-03-23 RX ADMIN — Medication 975 MILLIGRAM(S): at 14:12

## 2023-03-23 RX ADMIN — Medication 975 MILLIGRAM(S): at 06:04

## 2023-03-23 RX ADMIN — Medication 975 MILLIGRAM(S): at 06:50

## 2023-03-23 RX ADMIN — Medication 1 APPLICATION(S): at 17:43

## 2023-03-23 RX ADMIN — CHLORHEXIDINE GLUCONATE 1 APPLICATION(S): 213 SOLUTION TOPICAL at 14:12

## 2023-03-23 RX ADMIN — AMPICILLIN SODIUM AND SULBACTAM SODIUM 200 GRAM(S): 250; 125 INJECTION, POWDER, FOR SUSPENSION INTRAMUSCULAR; INTRAVENOUS at 10:49

## 2023-03-23 RX ADMIN — Medication 975 MILLIGRAM(S): at 15:00

## 2023-03-23 RX ADMIN — OXYCODONE HYDROCHLORIDE 10 MILLIGRAM(S): 5 TABLET ORAL at 19:29

## 2023-03-23 RX ADMIN — OXYCODONE HYDROCHLORIDE 10 MILLIGRAM(S): 5 TABLET ORAL at 17:42

## 2023-03-23 RX ADMIN — OXYCODONE HYDROCHLORIDE 10 MILLIGRAM(S): 5 TABLET ORAL at 22:23

## 2023-03-23 RX ADMIN — OXYCODONE HYDROCHLORIDE 10 MILLIGRAM(S): 5 TABLET ORAL at 06:04

## 2023-03-23 RX ADMIN — Medication 975 MILLIGRAM(S): at 22:23

## 2023-03-23 RX ADMIN — SODIUM ZIRCONIUM CYCLOSILICATE 10 GRAM(S): 10 POWDER, FOR SUSPENSION ORAL at 12:03

## 2023-03-23 RX ADMIN — OXYCODONE HYDROCHLORIDE 10 MILLIGRAM(S): 5 TABLET ORAL at 13:11

## 2023-03-23 RX ADMIN — Medication 2: at 07:41

## 2023-03-23 RX ADMIN — Medication 1 APPLICATION(S): at 06:05

## 2023-03-23 RX ADMIN — Medication 2: at 18:03

## 2023-03-23 RX ADMIN — Medication 81 MILLIGRAM(S): at 12:02

## 2023-03-23 RX ADMIN — OXYCODONE HYDROCHLORIDE 10 MILLIGRAM(S): 5 TABLET ORAL at 23:23

## 2023-03-23 RX ADMIN — OXYCODONE HYDROCHLORIDE 10 MILLIGRAM(S): 5 TABLET ORAL at 06:50

## 2023-03-23 RX ADMIN — OXYCODONE HYDROCHLORIDE 10 MILLIGRAM(S): 5 TABLET ORAL at 12:04

## 2023-03-23 NOTE — PROGRESS NOTE ADULT - PROBLEM SELECTOR PLAN 5
-diabetic retinopathy.  severe proliferative diabetic retinopathy, previously on cosopt, optho outpt follow up   -JoN4g=9.5%. FS better controlled in-house.  -c/w ISS for now and continue to monitor FS closely.  -patient on 4 units premeals and 5 units at bedtime  - c/w lantus 4 and ss. will titrate up as necessary  -c/w ASA daily

## 2023-03-23 NOTE — PROGRESS NOTE ADULT - ASSESSMENT
This is a 48yo Belgian speaking male with PMH of DM2, b/l BKA, ESRD (on HD MWF) well known to Orthopedics for treatment of right third finger 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 Sin/Dr. Singh) who was seen by Dr. Sin today in the office and sent in to [facility] for urgent irrigation and debridement. Patient states he missed dialysis today because he was instructed to come straight to the ED.     PAST MEDICAL & SURGICAL HISTORY:  ESRD on dialysis  H/O hypotension  Diabetes mellitus  S/P BKA (below knee amputation) bilateral  AV fistula (06 Mar 2023 17:32)            esrd on hd   will plan for hd as order    Excess fluids and waste products will be removed from your blood; your electrolytes will be balanced; your blood pressure will be controlled.      ANEMIA PLAN:  Anemia of chronic disease:  We will continue epo aiming for a HCT of 32-36 %.   We will monitor Iron stores, B12 and RBC folate .      ,send blood and urine cx,serial lactate levels,monitor vitals skyla sanchez hydration,monitor urine output and renal profile,iv abx

## 2023-03-23 NOTE — PROGRESS NOTE ADULT - SUBJECTIVE AND OBJECTIVE BOX
[facility] Division of Hospital Medicine  Job Grimaldo MD  Pager (M-F, 8A-5P): [phone]  Other Times:  [phone]    Patient is a 47y old  Male who presents with a chief complaint of Right forearm gas gangrene (23 Mar 2023 06:22)    SUBJECTIVE / OVERNIGHT EVENTS:  Patient offers no new complaints.  No F/C, N/V, CP, SOB, Cough, lightheadedness, dizziness, abdominal pain, diarrhea, dysuria.    MEDICATIONS  (STANDING):  acetaminophen     Tablet .. 975 milliGRAM(s) Oral every 8 hours  ampicillin/sulbactam  IVPB      ampicillin/sulbactam  IVPB 3 Gram(s) IV Intermittent every 24 hours  aspirin  chewable 81 milliGRAM(s) Oral daily  chlorhexidine 2% Cloths 1 Application(s) Topical daily  collagenase Ointment 1 Application(s) Topical two times a day  dextrose 5%. 1000 milliLiter(s) (50 mL/Hr) IV Continuous <Continuous>  dextrose 5%. 1000 milliLiter(s) (100 mL/Hr) IV Continuous <Continuous>  dextrose 50% Injectable 25 Gram(s) IV Push once  dextrose 50% Injectable 12.5 Gram(s) IV Push once  dextrose 50% Injectable 25 Gram(s) IV Push once  dorzolamide 2%/timolol 0.5% Ophthalmic Solution 1 Drop(s) Both EYES two times a day  glucagon  Injectable 1 milliGRAM(s) IntraMuscular once  insulin glargine Injectable (LANTUS) 4 Unit(s) SubCutaneous at bedtime  insulin lispro (ADMELOG) corrective regimen sliding scale   SubCutaneous at bedtime  insulin lispro (ADMELOG) corrective regimen sliding scale   SubCutaneous three times a day before meals  senna 2 Tablet(s) Oral at bedtime    MEDICATIONS  (PRN):  dextrose Oral Gel 15 Gram(s) Oral once PRN Blood Glucose LESS THAN 70 milliGRAM(s)/deciliter  oxyCODONE    IR 5 milliGRAM(s) Oral every 4 hours PRN Moderate Pain (4 - 6)  oxyCODONE    IR 10 milliGRAM(s) Oral every 4 hours PRN Severe Pain (7 - 10)      Vital Signs Last 24 Hrs  T(C): 36.7 (23 Mar 2023 09:29), Max: 37.4 (22 Mar 2023 22:12)  T(F): 98.1 (23 Mar 2023 09:29), Max: 99.3 (22 Mar 2023 22:12)  HR: 82 (23 Mar 2023 09:29) (71 - 84)  BP: 135/60 (23 Mar 2023 09:29) (135/60 - 185/89)  BP(mean): --  RR: 18 (23 Mar 2023 09:29) (18 - 18)  SpO2: 100% (23 Mar 2023 09:29) (99% - 100%)    Parameters below as of 23 Mar 2023 09:29  Patient On (Oxygen Delivery Method): room air      CAPILLARY BLOOD GLUCOSE      POCT Blood Glucose.: 112 mg/dL (23 Mar 2023 11:22)  POCT Blood Glucose.: 163 mg/dL (23 Mar 2023 07:18)  POCT Blood Glucose.: 150 mg/dL (22 Mar 2023 22:06)  POCT Blood Glucose.: 159 mg/dL (22 Mar 2023 18:59)  POCT Blood Glucose.: 142 mg/dL (22 Mar 2023 15:19)    I&O's Summary    22 Mar 2023 07:01  -  23 Mar 2023 07:00  --------------------------------------------------------  IN: 400 mL / OUT: 2400 mL / NET: -2000 mL        PHYSICAL EXAM:  CONSTITUTIONAL: NAD  EYES: PERRLA; conjunctiva and sclera clear  ENMT: Moist oral mucosa, no pharyngeal injection or exudates; normal dentition  NECK: Supple, no palpable masses; no thyromegaly  RESPIRATORY: Normal respiratory effort; lungs are clear to auscultation bilaterally  CARDIOVASCULAR: Regular rate and rhythm, normal S1 and S2, no murmur/rub/gallop; No lower extremity edema; Peripheral pulses are 2+ bilaterally  ABDOMEN: Nontender to palpation, normoactive bowel sounds, no rebound/guarding; No hepatosplenomegaly  MUSCULOSKELETAL:  Did not assess gait; no clubbing or cyanosis of digits; B/L BKA  PSYCH: A+O to person, place, and time; affect appropriate  NEUROLOGY: CN 2-12 are intact and symmetric; no gross sensory deficits   SKIN: surgical dressing c/d/i    LABS:                        8.4    7.69  )-----------( 345      ( 22 Mar 2023 17:41 )             27.4     03-22    140  |  97<L>  |  65<H>  ----------------------------<  167<H>  4.8   |  21<L>  |  8.17<H>    Ca    7.8<L>      22 Mar 2023 17:41                RADIOLOGY & ADDITIONAL TESTS:    Imaging Personally Reviewed:    Care Discussed with Consultants/Other Providers:    Care Discussed with Orthopedic PA about:

## 2023-03-23 NOTE — PROGRESS NOTE ADULT - SUBJECTIVE AND OBJECTIVE BOX
Patient is a 47y Male whom presented to the hospital with esrd on hd    PAST MEDICAL & SURGICAL HISTORY:  ESRD on dialysis      H/O hypotension      Diabetes mellitus      S/P BKA (below knee amputation) bilateral      AV fistula          MEDICATIONS  (STANDING):  acetaminophen     Tablet .. 975 milliGRAM(s) Oral every 8 hours  ampicillin/sulbactam  IVPB      aspirin  chewable 81 milliGRAM(s) Oral daily  dextrose 5%. 1000 milliLiter(s) (50 mL/Hr) IV Continuous <Continuous>  dextrose 5%. 1000 milliLiter(s) (100 mL/Hr) IV Continuous <Continuous>  dextrose 50% Injectable 25 Gram(s) IV Push once  dextrose 50% Injectable 12.5 Gram(s) IV Push once  dextrose 50% Injectable 25 Gram(s) IV Push once  glucagon  Injectable 1 milliGRAM(s) IntraMuscular once  insulin lispro (ADMELOG) corrective regimen sliding scale   SubCutaneous three times a day before meals  lactated ringers. 1000 milliLiter(s) (100 mL/Hr) IV Continuous <Continuous>      Allergies    No Known Drug Allergies  [place] (Rash)    Intolerances        SOCIAL HISTORY:  Denies ETOh,Smoking,     FAMILY HISTORY:  No pertinent family history in first degree relatives        REVIEW OF SYSTEMS:    CONSTITUTIONAL: No weakness, fevers or chills                           8.4    7.69  )-----------( 345      ( 22 Mar 2023 17:41 )             27.4       CBC Full  -  ( 22 Mar 2023 17:41 )  WBC Count : 7.69 K/uL  RBC Count : 3.14 M/uL  Hemoglobin : 8.4 g/dL  Hematocrit : 27.4 %  Platelet Count - Automated : 345 K/uL  Mean Cell Volume : 87.3 fL  Mean Cell Hemoglobin : 26.8 pg  Mean Cell Hemoglobin Concentration : 30.7 gm/dL  Auto Neutrophil # : 4.11 K/uL  Auto Lymphocyte # : 2.42 K/uL  Auto Monocyte # : 0.61 K/uL  Auto Eosinophil # : 0.44 K/uL  Auto Basophil # : 0.07 K/uL  Auto Neutrophil % : 53.5 %  Auto Lymphocyte % : 31.5 %  Auto Monocyte % : 7.9 %  Auto Eosinophil % : 5.7 %  Auto Basophil % : 0.9 %      03-22    140  |  97<L>  |  65<H>  ----------------------------<  167<H>  4.8   |  21<L>  |  8.17<H>    Ca    7.8<L>      22 Mar 2023 17:41        CAPILLARY BLOOD GLUCOSE      POCT Blood Glucose.: 158 mg/dL (23 Mar 2023 16:56)  POCT Blood Glucose.: 112 mg/dL (23 Mar 2023 11:22)  POCT Blood Glucose.: 163 mg/dL (23 Mar 2023 07:18)  POCT Blood Glucose.: 150 mg/dL (22 Mar 2023 22:06)      Vital Signs Last 24 Hrs  T(C): 36.3 (23 Mar 2023 18:04), Max: 37.4 (22 Mar 2023 22:12)  T(F): 97.3 (23 Mar 2023 18:04), Max: 99.3 (22 Mar 2023 22:12)  HR: 66 (23 Mar 2023 18:04) (66 - 84)  BP: 159/75 (23 Mar 2023 18:04) (135/60 - 162/80)  BP(mean): --  RR: 18 (23 Mar 2023 18:04) (18 - 18)  SpO2: 100% (23 Mar 2023 18:04) (99% - 100%)    Parameters below as of 23 Mar 2023 18:04  Patient On (Oxygen Delivery Method): room air                                            PHYSICAL EXAM:    Constitutional: NAD  HEENT: conjunctive   clear   Neck:  No JVD  Respiratory: CTAB  Cardiovascular: S1 and S2  Gastrointestinal: BS+, soft, NT/ND  Extremities: No peripheral edema ,  Prior right 3rd digit amputation.S/P BKA (below knee amputation) bilateral  Access: pos fistula

## 2023-03-23 NOTE — PROGRESS NOTE ADULT - ASSESSMENT
A/P: 46 yo M sp multiple I&D of Right hand and forearm and integra. Stable now in dry dressing placed and collagenase treatment    DVT prophylaxis- ASA daily  Follow up AM labs - HD tomorrow  Dressing change tomorrow   Pain control   Incentive spirometer  Continue abx per ID

## 2023-03-23 NOTE — PROGRESS NOTE ADULT - SUBJECTIVE AND OBJECTIVE BOX
Orthopedic Progress Note     S:  No acute events overnight, pain is well controlled.  Patient denies any chest pain, SOB, N/V, fevers/chills.    T(C): 36.5 (03-23-23 @ 06:00), Max: 37.4 (03-22-23 @ 22:12)  HR: 72 (03-23-23 @ 06:00) (63 - 84)  BP: 146/74 (03-23-23 @ 06:00) (138/75 - 185/89)  RR: 18 (03-23-23 @ 06:00) (17 - 18)  SpO2: 99% (03-23-23 @ 06:00) (99% - 100%)  Wt(kg): --I&O's Summary    22 Mar 2023 07:01  -  23 Mar 2023 06:24  --------------------------------------------------------  IN: 400 mL / OUT: 2400 mL / NET: -2000 mL        O:  Physical exam:  Gen: Alert and Oriented x3, No Acute Distress  Right upper extremity : dry dressing c/d/i, cling, wwp  Distally perfused           Labs:                        8.4    7.69  )-----------( 345      ( 22 Mar 2023 17:41 )             27.4    03-22    140  |  97<L>  |  65<H>  ----------------------------<  167<H>  4.8   |  21<L>  |  8.17<H>    Ca    7.8<L>      22 Mar 2023 17:41

## 2023-03-24 LAB
ANION GAP SERPL CALC-SCNC: 20 MMOL/L — HIGH (ref 7–14)
BASOPHILS # BLD AUTO: 0.07 K/UL — SIGNIFICANT CHANGE UP (ref 0–0.2)
BASOPHILS NFR BLD AUTO: 1.2 % — SIGNIFICANT CHANGE UP (ref 0–2)
BUN SERPL-MCNC: 62 MG/DL — HIGH (ref 7–23)
CALCIUM SERPL-MCNC: 8.4 MG/DL — SIGNIFICANT CHANGE UP (ref 8.4–10.5)
CHLORIDE SERPL-SCNC: 96 MMOL/L — LOW (ref 98–107)
CO2 SERPL-SCNC: 22 MMOL/L — SIGNIFICANT CHANGE UP (ref 22–31)
CREAT SERPL-MCNC: 7.57 MG/DL — HIGH (ref 0.5–1.3)
EGFR: 8 ML/MIN/1.73M2 — LOW
EOSINOPHIL # BLD AUTO: 0.43 K/UL — SIGNIFICANT CHANGE UP (ref 0–0.5)
EOSINOPHIL NFR BLD AUTO: 7.2 % — HIGH (ref 0–6)
GLUCOSE BLDC GLUCOMTR-MCNC: 168 MG/DL — HIGH (ref 70–99)
GLUCOSE BLDC GLUCOMTR-MCNC: 82 MG/DL — SIGNIFICANT CHANGE UP (ref 70–99)
GLUCOSE BLDC GLUCOMTR-MCNC: 85 MG/DL — SIGNIFICANT CHANGE UP (ref 70–99)
GLUCOSE BLDC GLUCOMTR-MCNC: 97 MG/DL — SIGNIFICANT CHANGE UP (ref 70–99)
GLUCOSE SERPL-MCNC: 73 MG/DL — SIGNIFICANT CHANGE UP (ref 70–99)
HCT VFR BLD CALC: 25.5 % — LOW (ref 39–50)
HGB BLD-MCNC: 7.8 G/DL — LOW (ref 13–17)
IANC: 2.6 K/UL — SIGNIFICANT CHANGE UP (ref 1.8–7.4)
IMM GRANULOCYTES NFR BLD AUTO: 0.2 % — SIGNIFICANT CHANGE UP (ref 0–0.9)
LYMPHOCYTES # BLD AUTO: 2.4 K/UL — SIGNIFICANT CHANGE UP (ref 1–3.3)
LYMPHOCYTES # BLD AUTO: 40.2 % — SIGNIFICANT CHANGE UP (ref 13–44)
MCHC RBC-ENTMCNC: 26.9 PG — LOW (ref 27–34)
MCHC RBC-ENTMCNC: 30.6 GM/DL — LOW (ref 32–36)
MCV RBC AUTO: 87.9 FL — SIGNIFICANT CHANGE UP (ref 80–100)
MONOCYTES # BLD AUTO: 0.46 K/UL — SIGNIFICANT CHANGE UP (ref 0–0.9)
MONOCYTES NFR BLD AUTO: 7.7 % — SIGNIFICANT CHANGE UP (ref 2–14)
NEUTROPHILS # BLD AUTO: 2.6 K/UL — SIGNIFICANT CHANGE UP (ref 1.8–7.4)
NEUTROPHILS NFR BLD AUTO: 43.5 % — SIGNIFICANT CHANGE UP (ref 43–77)
NRBC # BLD: 0 /100 WBCS — SIGNIFICANT CHANGE UP (ref 0–0)
NRBC # FLD: 0 K/UL — SIGNIFICANT CHANGE UP (ref 0–0)
PLATELET # BLD AUTO: 273 K/UL — SIGNIFICANT CHANGE UP (ref 150–400)
POTASSIUM SERPL-MCNC: 4.9 MMOL/L — SIGNIFICANT CHANGE UP (ref 3.5–5.3)
POTASSIUM SERPL-SCNC: 4.9 MMOL/L — SIGNIFICANT CHANGE UP (ref 3.5–5.3)
RBC # BLD: 2.9 M/UL — LOW (ref 4.2–5.8)
RBC # FLD: 14.9 % — HIGH (ref 10.3–14.5)
SODIUM SERPL-SCNC: 138 MMOL/L — SIGNIFICANT CHANGE UP (ref 135–145)
WBC # BLD: 5.97 K/UL — SIGNIFICANT CHANGE UP (ref 3.8–10.5)
WBC # FLD AUTO: 5.97 K/UL — SIGNIFICANT CHANGE UP (ref 3.8–10.5)

## 2023-03-24 RX ORDER — OXYCODONE HYDROCHLORIDE 5 MG/1
5 TABLET ORAL EVERY 4 HOURS
Refills: 0 | Status: DISCONTINUED | OUTPATIENT
Start: 2023-03-24 | End: 2023-03-24

## 2023-03-24 RX ORDER — OXYCODONE HYDROCHLORIDE 5 MG/1
10 TABLET ORAL EVERY 4 HOURS
Refills: 0 | Status: DISCONTINUED | OUTPATIENT
Start: 2023-03-24 | End: 2023-03-31

## 2023-03-24 RX ORDER — ERYTHROPOIETIN 10000 [IU]/ML
10000 INJECTION, SOLUTION INTRAVENOUS; SUBCUTANEOUS ONCE
Refills: 0 | Status: COMPLETED | OUTPATIENT
Start: 2023-03-24 | End: 2023-03-27

## 2023-03-24 RX ADMIN — AMPICILLIN SODIUM AND SULBACTAM SODIUM 200 GRAM(S): 250; 125 INJECTION, POWDER, FOR SUSPENSION INTRAMUSCULAR; INTRAVENOUS at 15:01

## 2023-03-24 RX ADMIN — Medication 1 APPLICATION(S): at 05:49

## 2023-03-24 RX ADMIN — DORZOLAMIDE HYDROCHLORIDE TIMOLOL MALEATE 1 DROP(S): 20; 5 SOLUTION/ DROPS OPHTHALMIC at 18:22

## 2023-03-24 RX ADMIN — Medication 2: at 18:21

## 2023-03-24 RX ADMIN — OXYCODONE HYDROCHLORIDE 10 MILLIGRAM(S): 5 TABLET ORAL at 07:29

## 2023-03-24 RX ADMIN — CHLORHEXIDINE GLUCONATE 1 APPLICATION(S): 213 SOLUTION TOPICAL at 15:02

## 2023-03-24 RX ADMIN — OXYCODONE HYDROCHLORIDE 5 MILLIGRAM(S): 5 TABLET ORAL at 22:58

## 2023-03-24 RX ADMIN — Medication 1 APPLICATION(S): at 18:22

## 2023-03-24 RX ADMIN — OXYCODONE HYDROCHLORIDE 10 MILLIGRAM(S): 5 TABLET ORAL at 22:58

## 2023-03-24 RX ADMIN — DORZOLAMIDE HYDROCHLORIDE TIMOLOL MALEATE 1 DROP(S): 20; 5 SOLUTION/ DROPS OPHTHALMIC at 07:29

## 2023-03-24 RX ADMIN — Medication 975 MILLIGRAM(S): at 16:01

## 2023-03-24 RX ADMIN — OXYCODONE HYDROCHLORIDE 10 MILLIGRAM(S): 5 TABLET ORAL at 08:00

## 2023-03-24 RX ADMIN — Medication 975 MILLIGRAM(S): at 22:00

## 2023-03-24 RX ADMIN — Medication 81 MILLIGRAM(S): at 15:01

## 2023-03-24 RX ADMIN — Medication 975 MILLIGRAM(S): at 06:48

## 2023-03-24 RX ADMIN — OXYCODONE HYDROCHLORIDE 10 MILLIGRAM(S): 5 TABLET ORAL at 16:06

## 2023-03-24 RX ADMIN — INSULIN GLARGINE 4 UNIT(S): 100 INJECTION, SOLUTION SUBCUTANEOUS at 22:00

## 2023-03-24 RX ADMIN — Medication 975 MILLIGRAM(S): at 15:01

## 2023-03-24 RX ADMIN — Medication 975 MILLIGRAM(S): at 05:48

## 2023-03-24 RX ADMIN — OXYCODONE HYDROCHLORIDE 10 MILLIGRAM(S): 5 TABLET ORAL at 15:06

## 2023-03-24 NOTE — PROGRESS NOTE ADULT - ASSESSMENT
This is a 46yo Surinamese speaking male with PMH of DM2, b/l BKA, ESRD (on HD MWF) well known to Orthopedics for treatment of right third finger 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 Sin/Dr. Singh) who was seen by Dr. Sin today in the office and sent in to [facility] for urgent irrigation and debridement. Patient states he missed dialysis today because he was instructed to come straight to the ED.     PAST MEDICAL & SURGICAL HISTORY:  ESRD on dialysis  H/O hypotension  Diabetes mellitus  S/P BKA (below knee amputation) bilateral  AV fistula (06 Mar 2023 17:32)            esrd on hd   will plan for hd as order    Excess fluids and waste products will be removed from your blood; your electrolytes will be balanced; your blood pressure will be controlled.      ANEMIA PLAN:  Anemia of chronic disease:  We will continue epo aiming for a HCT of 32-36 %.   We will monitor Iron stores, B12 and RBC folate .      ,send blood and urine cx,serial lactate levels,monitor vitals skyla sanchez hydration,monitor urine output and renal profile,iv abx

## 2023-03-24 NOTE — PROGRESS NOTE ADULT - ASSESSMENT
A/P: 46 yo M sp multiple I&D of Right hand and forearm and integra. Stable now in dry dressing placed and collagenase treatment.     DVT prophylaxis- ASA daily  Follow up AM labs -   Dressing changed on 3/24  Pain control   Incentive spirometer  Continuing unasyn til 4/17    Orthopaedic Surgery  [facility] [id number]  [facility]        [id number]  [facility]  [id number]/ [phone]

## 2023-03-24 NOTE — PROGRESS NOTE ADULT - SUBJECTIVE AND OBJECTIVE BOX
Orthopedic Progress Note     S:  No acute events overnight, pain is well controlled.          O:    Vital Signs Last 24 Hrs  T(C): 36.6 (24 Mar 2023 09:45), Max: 36.6 (23 Mar 2023 21:35)  T(F): 97.8 (24 Mar 2023 09:45), Max: 97.8 (23 Mar 2023 21:35)  HR: 68 (24 Mar 2023 09:45) (64 - 68)  BP: 113/56 (24 Mar 2023 09:45) (113/56 - 172/72)  BP(mean): --  RR: 18 (24 Mar 2023 09:45) (16 - 18)  SpO2: 99% (24 Mar 2023 09:45) (98% - 100%)    Parameters below as of 24 Mar 2023 09:45  Patient On (Oxygen Delivery Method): room air      Physical exam:  Gen: Alert and Oriented x3, No Acute Distress  Right upper extremity : dry dressing c/d/i, cling, wwp  Distally perfused

## 2023-03-24 NOTE — PROGRESS NOTE ADULT - SUBJECTIVE AND OBJECTIVE BOX
Patient is a 47y Male whom presented to the hospital with esrd on hd    PAST MEDICAL & SURGICAL HISTORY:  ESRD on dialysis      H/O hypotension      Diabetes mellitus      S/P BKA (below knee amputation) bilateral      AV fistula          MEDICATIONS  (STANDING):  acetaminophen     Tablet .. 975 milliGRAM(s) Oral every 8 hours  ampicillin/sulbactam  IVPB      aspirin  chewable 81 milliGRAM(s) Oral daily  dextrose 5%. 1000 milliLiter(s) (50 mL/Hr) IV Continuous <Continuous>  dextrose 5%. 1000 milliLiter(s) (100 mL/Hr) IV Continuous <Continuous>  dextrose 50% Injectable 25 Gram(s) IV Push once  dextrose 50% Injectable 12.5 Gram(s) IV Push once  dextrose 50% Injectable 25 Gram(s) IV Push once  glucagon  Injectable 1 milliGRAM(s) IntraMuscular once  insulin lispro (ADMELOG) corrective regimen sliding scale   SubCutaneous three times a day before meals  lactated ringers. 1000 milliLiter(s) (100 mL/Hr) IV Continuous <Continuous>      Allergies    No Known Drug Allergies  [place] (Rash)    Intolerances        SOCIAL HISTORY:  Denies ETOh,Smoking,     FAMILY HISTORY:  No pertinent family history in first degree relatives        REVIEW OF SYSTEMS:    CONSTITUTIONAL: No weakness, fevers or chills                             7.8    5.97  )-----------( 273      ( 24 Mar 2023 13:47 )             25.5       CBC Full  -  ( 24 Mar 2023 13:47 )  WBC Count : 5.97 K/uL  RBC Count : 2.90 M/uL  Hemoglobin : 7.8 g/dL  Hematocrit : 25.5 %  Platelet Count - Automated : 273 K/uL  Mean Cell Volume : 87.9 fL  Mean Cell Hemoglobin : 26.9 pg  Mean Cell Hemoglobin Concentration : 30.6 gm/dL  Auto Neutrophil # : 2.60 K/uL  Auto Lymphocyte # : 2.40 K/uL  Auto Monocyte # : 0.46 K/uL  Auto Eosinophil # : 0.43 K/uL  Auto Basophil # : 0.07 K/uL  Auto Neutrophil % : 43.5 %  Auto Lymphocyte % : 40.2 %  Auto Monocyte % : 7.7 %  Auto Eosinophil % : 7.2 %  Auto Basophil % : 1.2 %      03-24    138  |  96<L>  |  62<H>  ----------------------------<  73  4.9   |  22  |  7.57<H>    Ca    8.4      24 Mar 2023 10:30        CAPILLARY BLOOD GLUCOSE      POCT Blood Glucose.: 168 mg/dL (24 Mar 2023 18:12)  POCT Blood Glucose.: 85 mg/dL (24 Mar 2023 13:59)  POCT Blood Glucose.: 82 mg/dL (24 Mar 2023 10:31)  POCT Blood Glucose.: 97 mg/dL (24 Mar 2023 07:13)  POCT Blood Glucose.: 199 mg/dL (23 Mar 2023 21:20)      Vital Signs Last 24 Hrs  T(C): 36.6 (24 Mar 2023 17:46), Max: 36.7 (24 Mar 2023 10:50)  T(F): 97.8 (24 Mar 2023 17:46), Max: 98.1 (24 Mar 2023 10:50)  HR: 67 (24 Mar 2023 17:46) (59 - 68)  BP: 167/70 (24 Mar 2023 17:46) (113/56 - 172/72)  BP(mean): --  RR: 18 (24 Mar 2023 17:46) (16 - 18)  SpO2: 99% (24 Mar 2023 17:46) (98% - 100%)    Parameters below as of 24 Mar 2023 17:46  Patient On (Oxygen Delivery Method): room air                                            PHYSICAL EXAM:    Constitutional: NAD  HEENT: conjunctive   clear   Neck:  No JVD  Respiratory: CTAB  Cardiovascular: S1 and S2  Gastrointestinal: BS+, soft, NT/ND  Extremities: No peripheral edema ,  Prior right 3rd digit amputation.S/P BKA (below knee amputation) bilateral  Access: pos fistula

## 2023-03-25 LAB
GLUCOSE BLDC GLUCOMTR-MCNC: 106 MG/DL — HIGH (ref 70–99)
GLUCOSE BLDC GLUCOMTR-MCNC: 141 MG/DL — HIGH (ref 70–99)
GLUCOSE BLDC GLUCOMTR-MCNC: 176 MG/DL — HIGH (ref 70–99)
GLUCOSE BLDC GLUCOMTR-MCNC: 97 MG/DL — SIGNIFICANT CHANGE UP (ref 70–99)
GLUCOSE BLDC GLUCOMTR-MCNC: 99 MG/DL — SIGNIFICANT CHANGE UP (ref 70–99)

## 2023-03-25 PROCEDURE — 99232 SBSQ HOSP IP/OBS MODERATE 35: CPT

## 2023-03-25 RX ADMIN — DORZOLAMIDE HYDROCHLORIDE TIMOLOL MALEATE 1 DROP(S): 20; 5 SOLUTION/ DROPS OPHTHALMIC at 06:00

## 2023-03-25 RX ADMIN — CHLORHEXIDINE GLUCONATE 1 APPLICATION(S): 213 SOLUTION TOPICAL at 13:48

## 2023-03-25 RX ADMIN — OXYCODONE HYDROCHLORIDE 10 MILLIGRAM(S): 5 TABLET ORAL at 23:09

## 2023-03-25 RX ADMIN — Medication 1 APPLICATION(S): at 06:00

## 2023-03-25 RX ADMIN — Medication 2: at 11:30

## 2023-03-25 RX ADMIN — Medication 975 MILLIGRAM(S): at 07:15

## 2023-03-25 RX ADMIN — OXYCODONE HYDROCHLORIDE 10 MILLIGRAM(S): 5 TABLET ORAL at 14:47

## 2023-03-25 RX ADMIN — INSULIN GLARGINE 4 UNIT(S): 100 INJECTION, SOLUTION SUBCUTANEOUS at 23:04

## 2023-03-25 RX ADMIN — Medication 975 MILLIGRAM(S): at 13:48

## 2023-03-25 RX ADMIN — Medication 975 MILLIGRAM(S): at 14:47

## 2023-03-25 RX ADMIN — OXYCODONE HYDROCHLORIDE 10 MILLIGRAM(S): 5 TABLET ORAL at 13:47

## 2023-03-25 RX ADMIN — Medication 975 MILLIGRAM(S): at 23:54

## 2023-03-25 RX ADMIN — Medication 975 MILLIGRAM(S): at 22:54

## 2023-03-25 RX ADMIN — AMPICILLIN SODIUM AND SULBACTAM SODIUM 200 GRAM(S): 250; 125 INJECTION, POWDER, FOR SUSPENSION INTRAMUSCULAR; INTRAVENOUS at 13:46

## 2023-03-25 RX ADMIN — OXYCODONE HYDROCHLORIDE 10 MILLIGRAM(S): 5 TABLET ORAL at 23:54

## 2023-03-25 RX ADMIN — Medication 1 APPLICATION(S): at 18:46

## 2023-03-25 RX ADMIN — OXYCODONE HYDROCHLORIDE 10 MILLIGRAM(S): 5 TABLET ORAL at 07:20

## 2023-03-25 RX ADMIN — Medication 81 MILLIGRAM(S): at 13:47

## 2023-03-25 RX ADMIN — DORZOLAMIDE HYDROCHLORIDE TIMOLOL MALEATE 1 DROP(S): 20; 5 SOLUTION/ DROPS OPHTHALMIC at 18:46

## 2023-03-25 NOTE — PROGRESS NOTE ADULT - SUBJECTIVE AND OBJECTIVE BOX
Patient is a 47y Male whom presented to the hospital with esrd on hd    PAST MEDICAL & SURGICAL HISTORY:  ESRD on dialysis      H/O hypotension      Diabetes mellitus      S/P BKA (below knee amputation) bilateral      AV fistula          MEDICATIONS  (STANDING):  acetaminophen     Tablet .. 975 milliGRAM(s) Oral every 8 hours  ampicillin/sulbactam  IVPB      aspirin  chewable 81 milliGRAM(s) Oral daily  dextrose 5%. 1000 milliLiter(s) (50 mL/Hr) IV Continuous <Continuous>  dextrose 5%. 1000 milliLiter(s) (100 mL/Hr) IV Continuous <Continuous>  dextrose 50% Injectable 25 Gram(s) IV Push once  dextrose 50% Injectable 12.5 Gram(s) IV Push once  dextrose 50% Injectable 25 Gram(s) IV Push once  glucagon  Injectable 1 milliGRAM(s) IntraMuscular once  insulin lispro (ADMELOG) corrective regimen sliding scale   SubCutaneous three times a day before meals  lactated ringers. 1000 milliLiter(s) (100 mL/Hr) IV Continuous <Continuous>      Allergies    No Known Drug Allergies  [place] (Rash)    Intolerances        SOCIAL HISTORY:  Denies ETOh,Smoking,     FAMILY HISTORY:  No pertinent family history in first degree relatives        REVIEW OF SYSTEMS:    CONSTITUTIONAL: No weakness, fevers or chills                                                                                   7.8    5.97  )-----------( 273      ( 24 Mar 2023 13:47 )             25.5       CBC Full  -  ( 24 Mar 2023 13:47 )  WBC Count : 5.97 K/uL  RBC Count : 2.90 M/uL  Hemoglobin : 7.8 g/dL  Hematocrit : 25.5 %  Platelet Count - Automated : 273 K/uL  Mean Cell Volume : 87.9 fL  Mean Cell Hemoglobin : 26.9 pg  Mean Cell Hemoglobin Concentration : 30.6 gm/dL  Auto Neutrophil # : 2.60 K/uL  Auto Lymphocyte # : 2.40 K/uL  Auto Monocyte # : 0.46 K/uL  Auto Eosinophil # : 0.43 K/uL  Auto Basophil # : 0.07 K/uL  Auto Neutrophil % : 43.5 %  Auto Lymphocyte % : 40.2 %  Auto Monocyte % : 7.7 %  Auto Eosinophil % : 7.2 %  Auto Basophil % : 1.2 %      03-24    138  |  96<L>  |  62<H>  ----------------------------<  73  4.9   |  22  |  7.57<H>    Ca    8.4      24 Mar 2023 10:30        CAPILLARY BLOOD GLUCOSE      POCT Blood Glucose.: 106 mg/dL (25 Mar 2023 16:40)  POCT Blood Glucose.: 176 mg/dL (25 Mar 2023 11:21)  POCT Blood Glucose.: 141 mg/dL (25 Mar 2023 07:25)  POCT Blood Glucose.: 97 mg/dL (24 Mar 2023 22:36)  POCT Blood Glucose.: 168 mg/dL (24 Mar 2023 18:12)      Vital Signs Last 24 Hrs  T(C): 36.7 (25 Mar 2023 10:31), Max: 36.7 (25 Mar 2023 10:31)  T(F): 98 (25 Mar 2023 10:31), Max: 98 (25 Mar 2023 10:31)  HR: 72 (25 Mar 2023 10:31) (72 - 72)  BP: 138/49 (25 Mar 2023 10:31) (138/49 - 156/72)  BP(mean): --  RR: 18 (25 Mar 2023 10:31) (18 - 18)  SpO2: 99% (25 Mar 2023 10:31) (99% - 99%)    Parameters below as of 25 Mar 2023 10:31  Patient On (Oxygen Delivery Method): room air            PHYSICAL EXAM:    Constitutional: NAD  HEENT: conjunctive   clear   Neck:  No JVD  Respiratory: CTAB  Cardiovascular: S1 and S2  Gastrointestinal: BS+, soft, NT/ND  Extremities: No peripheral edema ,  Prior right 3rd digit amputation.S/P BKA (below knee amputation) bilateral  Access: pos fistula

## 2023-03-25 NOTE — PROGRESS NOTE ADULT - ASSESSMENT
This is a 48yo Cymraes speaking male with PMH of DM2, b/l BKA, ESRD (on HD MWF) well known to Orthopedics for treatment of right third finger 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 Sin/Dr. Singh) who was seen by Dr. Sin today in the office and sent in to [facility] for urgent irrigation and debridement. Patient states he missed dialysis today because he was instructed to come straight to the ED.     PAST MEDICAL & SURGICAL HISTORY:  ESRD on dialysis  H/O hypotension  Diabetes mellitus  S/P BKA (below knee amputation) bilateral  AV fistula (06 Mar 2023 17:32)            esrd on hd   will plan for hd as order    Excess fluids and waste products will be removed from your blood; your electrolytes will be balanced; your blood pressure will be controlled.      ANEMIA PLAN:  Anemia of chronic disease:  We will continue epo aiming for a HCT of 32-36 %.   We will monitor Iron stores, B12 and RBC folate .      ,send blood and urine cx,serial lactate levels,monitor vitals skyla sanchez hydration,monitor urine output and renal profile,iv abx

## 2023-03-25 NOTE — PROGRESS NOTE ADULT - ASSESSMENT
A/P: 46 yo M sp multiple I&D of Right hand and forearm and integra. Stable now in dry dressing placed and collagenase treatment.     DVT prophylaxis- ASA daily  Follow up AM labs -   Dressing changed on 3/24  Pain control   Incentive spirometer  Continuing unasyn til 4/17    Orthopaedic Surgery  [facility] [place] [id number]  [facility]        [id number]  [facility]  [id number]/ [phone]

## 2023-03-25 NOTE — PROGRESS NOTE ADULT - ASSESSMENT
47 y.o. Male w/ hx  DM2 c/b PAD w/ B/L BKA, ESRD - HD MWF, admission for Vascular steal syndrome c/b gas gangrene of Rt middle finger and forearm s/p amputation and debridement on 1-2/23, on long-term IV Abx, p/w OM s/p I+D on 3/6.

## 2023-03-25 NOTE — PROGRESS NOTE ADULT - PROBLEM SELECTOR PLAN 5
-diabetic retinopathy.  severe proliferative diabetic retinopathy, previously on cosopt, optho outpt follow up   -XtR1w=2.5%. FS better controlled in-house.  -c/w ISS for now and continue to monitor FS closely.  -patient on 4 units premeals and 5 units at bedtime  - c/w lantus 4 and ss. will titrate up as necessary  -c/w ASA daily

## 2023-03-26 LAB
GLUCOSE BLDC GLUCOMTR-MCNC: 109 MG/DL — HIGH (ref 70–99)
GLUCOSE BLDC GLUCOMTR-MCNC: 149 MG/DL — HIGH (ref 70–99)
GLUCOSE BLDC GLUCOMTR-MCNC: 194 MG/DL — HIGH (ref 70–99)
GLUCOSE BLDC GLUCOMTR-MCNC: 217 MG/DL — HIGH (ref 70–99)

## 2023-03-26 PROCEDURE — 99233 SBSQ HOSP IP/OBS HIGH 50: CPT

## 2023-03-26 RX ADMIN — OXYCODONE HYDROCHLORIDE 10 MILLIGRAM(S): 5 TABLET ORAL at 06:04

## 2023-03-26 RX ADMIN — CHLORHEXIDINE GLUCONATE 1 APPLICATION(S): 213 SOLUTION TOPICAL at 11:30

## 2023-03-26 RX ADMIN — Medication 81 MILLIGRAM(S): at 14:24

## 2023-03-26 RX ADMIN — Medication 975 MILLIGRAM(S): at 06:00

## 2023-03-26 RX ADMIN — OXYCODONE HYDROCHLORIDE 10 MILLIGRAM(S): 5 TABLET ORAL at 15:00

## 2023-03-26 RX ADMIN — OXYCODONE HYDROCHLORIDE 10 MILLIGRAM(S): 5 TABLET ORAL at 18:08

## 2023-03-26 RX ADMIN — INSULIN GLARGINE 4 UNIT(S): 100 INJECTION, SOLUTION SUBCUTANEOUS at 21:56

## 2023-03-26 RX ADMIN — OXYCODONE HYDROCHLORIDE 10 MILLIGRAM(S): 5 TABLET ORAL at 19:06

## 2023-03-26 RX ADMIN — Medication 975 MILLIGRAM(S): at 22:58

## 2023-03-26 RX ADMIN — Medication 975 MILLIGRAM(S): at 07:00

## 2023-03-26 RX ADMIN — DORZOLAMIDE HYDROCHLORIDE TIMOLOL MALEATE 1 DROP(S): 20; 5 SOLUTION/ DROPS OPHTHALMIC at 17:58

## 2023-03-26 RX ADMIN — DORZOLAMIDE HYDROCHLORIDE TIMOLOL MALEATE 1 DROP(S): 20; 5 SOLUTION/ DROPS OPHTHALMIC at 06:01

## 2023-03-26 RX ADMIN — Medication 975 MILLIGRAM(S): at 21:56

## 2023-03-26 RX ADMIN — Medication 1 APPLICATION(S): at 06:00

## 2023-03-26 RX ADMIN — Medication 1 APPLICATION(S): at 17:58

## 2023-03-26 RX ADMIN — Medication 975 MILLIGRAM(S): at 15:06

## 2023-03-26 RX ADMIN — Medication 975 MILLIGRAM(S): at 14:06

## 2023-03-26 RX ADMIN — OXYCODONE HYDROCHLORIDE 10 MILLIGRAM(S): 5 TABLET ORAL at 23:07

## 2023-03-26 RX ADMIN — OXYCODONE HYDROCHLORIDE 10 MILLIGRAM(S): 5 TABLET ORAL at 14:06

## 2023-03-26 RX ADMIN — OXYCODONE HYDROCHLORIDE 10 MILLIGRAM(S): 5 TABLET ORAL at 07:00

## 2023-03-26 RX ADMIN — AMPICILLIN SODIUM AND SULBACTAM SODIUM 200 GRAM(S): 250; 125 INJECTION, POWDER, FOR SUSPENSION INTRAMUSCULAR; INTRAVENOUS at 14:24

## 2023-03-26 NOTE — PROGRESS NOTE ADULT - ASSESSMENT
This is a 46yo Chilean speaking male with PMH of DM2, b/l BKA, ESRD (on HD MWF) well known to Orthopedics for treatment of right third finger 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 Sin/Dr. Singh) who was seen by Dr. Sin today in the office and sent in to [facility] for urgent irrigation and debridement. Patient states he missed dialysis today because he was instructed to come straight to the ED.     PAST MEDICAL & SURGICAL HISTORY:  ESRD on dialysis  H/O hypotension  Diabetes mellitus  S/P BKA (below knee amputation) bilateral  AV fistula (06 Mar 2023 17:32)            esrd on hd   will plan for hd as order    Excess fluids and waste products will be removed from your blood; your electrolytes will be balanced; your blood pressure will be controlled.      ANEMIA PLAN:  Anemia of chronic disease:  We will continue epo aiming for a HCT of 32-36 %.   We will monitor Iron stores, B12 and RBC folate .      ,send blood and urine cx,serial lactate levels,monitor vitals skyla sanchez hydration,monitor urine output and renal profile,iv abx

## 2023-03-26 NOTE — PROGRESS NOTE ADULT - PROBLEM SELECTOR PLAN 5
-diabetic retinopathy.  severe proliferative diabetic retinopathy, previously on cosopt, optho outpt follow up   -SrF0l=5.5%. FS better controlled in-house.  -c/w ISS for now and continue to monitor FS closely.  -patient on 4 units premeals and 5 units at bedtime  - c/w lantus 4 and ss. will titrate up as necessary  -c/w ASA daily

## 2023-03-26 NOTE — PROGRESS NOTE ADULT - ASSESSMENT
A/P: 48 yo M sp multiple I&D of Right hand and forearm and Integra. Stable now in dry dressing placed and collagenase treatment.     DVT prophylaxis- ASA daily  Last dressing changed on 3/26  Pain control   Incentive spirometer  Continuing Unasyn til 4/17

## 2023-03-26 NOTE — PROGRESS NOTE ADULT - SUBJECTIVE AND OBJECTIVE BOX
Orthopedic Progress Note     S:  No acute events overnight, pain is well controlled. Dressing changed today.    O:  Vital Signs Last 24 Hrs  T(C): 36.8 (26 Mar 2023 09:34), Max: 36.8 (26 Mar 2023 09:34)  T(F): 98.3 (26 Mar 2023 09:34), Max: 98.3 (26 Mar 2023 09:34)  HR: 66 (26 Mar 2023 09:34) (60 - 72)  BP: 152/66 (26 Mar 2023 09:34) (123/61 - 152/66)  RR: 16 (26 Mar 2023 09:34) (16 - 18)  SpO2: 99% (26 Mar 2023 09:34) (96% - 100%)  Parameters below as of 26 Mar 2023 09:34  Patient On (Oxygen Delivery Method): room air      Physical exam:  Gen: Alert and Oriented x3, No Acute Distress  Right upper extremity : dry dressing c/d/i, cling, wwp  Distally perfused                          7.8    5.97  )-----------( 273      ( 24 Mar 2023 13:47 )             25.5

## 2023-03-26 NOTE — PROGRESS NOTE ADULT - SUBJECTIVE AND OBJECTIVE BOX
Patient is a 47y old  Male who presents with a chief complaint of Right forearm gas gangrene (26 Mar 2023 14:23)      SUBJECTIVE / OVERNIGHT EVENTS:  Patient has no new complaints. Denies cp, SOB, abdominal pain, N/V/D     MEDICATIONS  (STANDING):  acetaminophen     Tablet .. 975 milliGRAM(s) Oral every 8 hours  ampicillin/sulbactam  IVPB 3 Gram(s) IV Intermittent every 24 hours  ampicillin/sulbactam  IVPB      aspirin  chewable 81 milliGRAM(s) Oral daily  chlorhexidine 2% Cloths 1 Application(s) Topical daily  collagenase Ointment 1 Application(s) Topical two times a day  dextrose 5%. 1000 milliLiter(s) (50 mL/Hr) IV Continuous <Continuous>  dextrose 5%. 1000 milliLiter(s) (100 mL/Hr) IV Continuous <Continuous>  dextrose 50% Injectable 25 Gram(s) IV Push once  dextrose 50% Injectable 12.5 Gram(s) IV Push once  dextrose 50% Injectable 25 Gram(s) IV Push once  dorzolamide 2%/timolol 0.5% Ophthalmic Solution 1 Drop(s) Both EYES two times a day  epoetin deidre-epbx (RETACRIT) Injectable 48691 Unit(s) SubCutaneous once  glucagon  Injectable 1 milliGRAM(s) IntraMuscular once  insulin glargine Injectable (LANTUS) 4 Unit(s) SubCutaneous at bedtime  insulin lispro (ADMELOG) corrective regimen sliding scale   SubCutaneous three times a day before meals  insulin lispro (ADMELOG) corrective regimen sliding scale   SubCutaneous at bedtime  senna 2 Tablet(s) Oral at bedtime    MEDICATIONS  (PRN):  dextrose Oral Gel 15 Gram(s) Oral once PRN Blood Glucose LESS THAN 70 milliGRAM(s)/deciliter  oxyCODONE    IR 5 milliGRAM(s) Oral every 4 hours PRN Moderate Pain (4 - 6)  oxyCODONE    IR 10 milliGRAM(s) Oral every 4 hours PRN Severe Pain (7 - 10)      Vital Signs Last 24 Hrs  T(C): 36.3 (26 Mar 2023 14:23), Max: 36.8 (26 Mar 2023 09:34)  T(F): 97.3 (26 Mar 2023 14:23), Max: 98.3 (26 Mar 2023 09:34)  HR: 70 (26 Mar 2023 14:23) (60 - 70)  BP: 145/67 (26 Mar 2023 14:23) (123/61 - 152/66)  BP(mean): --  RR: 16 (26 Mar 2023 14:23) (16 - 18)  SpO2: 100% (26 Mar 2023 14:23) (96% - 100%)    Parameters below as of 26 Mar 2023 14:23  Patient On (Oxygen Delivery Method): room air      CAPILLARY BLOOD GLUCOSE      POCT Blood Glucose.: 109 mg/dL (26 Mar 2023 07:31)  POCT Blood Glucose.: 99 mg/dL (25 Mar 2023 22:18)  POCT Blood Glucose.: 106 mg/dL (25 Mar 2023 16:40)    I&O's Summary    26 Mar 2023 07:01  -  26 Mar 2023 14:53  --------------------------------------------------------  IN: 820 mL / OUT: 0 mL / NET: 820 mL        PHYSICAL EXAM:   GENERAL: NAD, well-developed  HEAD:  Atraumatic, Normocephalic  EYES: EOMI, PERRLA, conjunctiva and sclera clear  NECK: Supple, No JVD  CHEST/LUNG: Clear to auscultation bilaterally; No wheeze  HEART: Regular rate and rhythm; No murmurs, rubs, or gallops  ABDOMEN: Soft, Nontender, Nondistended; Bowel sounds present  EXTREMITIES: S/P B/L BKA 2+ Peripheral Pulses, No clubbing, cyanosis, or edema   PSYCH: AAOx3  NEUROLOGY: non-focal  SKIN: surgical dressing C/D/I    LABS:                    RADIOLOGY & ADDITIONAL TESTS:    Imaging Personally Reviewed:    Consultant(s) Notes Reviewed:      Care Discussed with Consultants/Other Providers:

## 2023-03-27 LAB
ANION GAP SERPL CALC-SCNC: 22 MMOL/L — HIGH (ref 7–14)
BUN SERPL-MCNC: 87 MG/DL — HIGH (ref 7–23)
CALCIUM SERPL-MCNC: 8.2 MG/DL — LOW (ref 8.4–10.5)
CHLORIDE SERPL-SCNC: 96 MMOL/L — LOW (ref 98–107)
CO2 SERPL-SCNC: 19 MMOL/L — LOW (ref 22–31)
CREAT SERPL-MCNC: 8.72 MG/DL — HIGH (ref 0.5–1.3)
EGFR: 7 ML/MIN/1.73M2 — LOW
GLUCOSE BLDC GLUCOMTR-MCNC: 159 MG/DL — HIGH (ref 70–99)
GLUCOSE BLDC GLUCOMTR-MCNC: 88 MG/DL — SIGNIFICANT CHANGE UP (ref 70–99)
GLUCOSE BLDC GLUCOMTR-MCNC: 90 MG/DL — SIGNIFICANT CHANGE UP (ref 70–99)
GLUCOSE BLDC GLUCOMTR-MCNC: 99 MG/DL — SIGNIFICANT CHANGE UP (ref 70–99)
GLUCOSE BLDC GLUCOMTR-MCNC: 99 MG/DL — SIGNIFICANT CHANGE UP (ref 70–99)
GLUCOSE SERPL-MCNC: 90 MG/DL — SIGNIFICANT CHANGE UP (ref 70–99)
HCT VFR BLD CALC: 26.6 % — LOW (ref 39–50)
HGB BLD-MCNC: 7.9 G/DL — LOW (ref 13–17)
MCHC RBC-ENTMCNC: 26.4 PG — LOW (ref 27–34)
MCHC RBC-ENTMCNC: 29.7 GM/DL — LOW (ref 32–36)
MCV RBC AUTO: 89 FL — SIGNIFICANT CHANGE UP (ref 80–100)
NRBC # BLD: 0 /100 WBCS — SIGNIFICANT CHANGE UP (ref 0–0)
NRBC # FLD: 0 K/UL — SIGNIFICANT CHANGE UP (ref 0–0)
PLATELET # BLD AUTO: 229 K/UL — SIGNIFICANT CHANGE UP (ref 150–400)
POTASSIUM SERPL-MCNC: 5.4 MMOL/L — HIGH (ref 3.5–5.3)
POTASSIUM SERPL-SCNC: 5.4 MMOL/L — HIGH (ref 3.5–5.3)
RBC # BLD: 2.99 M/UL — LOW (ref 4.2–5.8)
RBC # FLD: 14.7 % — HIGH (ref 10.3–14.5)
SODIUM SERPL-SCNC: 137 MMOL/L — SIGNIFICANT CHANGE UP (ref 135–145)
WBC # BLD: 6.44 K/UL — SIGNIFICANT CHANGE UP (ref 3.8–10.5)
WBC # FLD AUTO: 6.44 K/UL — SIGNIFICANT CHANGE UP (ref 3.8–10.5)

## 2023-03-27 PROCEDURE — 99233 SBSQ HOSP IP/OBS HIGH 50: CPT

## 2023-03-27 RX ADMIN — Medication 975 MILLIGRAM(S): at 06:02

## 2023-03-27 RX ADMIN — AMPICILLIN SODIUM AND SULBACTAM SODIUM 200 GRAM(S): 250; 125 INJECTION, POWDER, FOR SUSPENSION INTRAMUSCULAR; INTRAVENOUS at 14:23

## 2023-03-27 RX ADMIN — DORZOLAMIDE HYDROCHLORIDE TIMOLOL MALEATE 1 DROP(S): 20; 5 SOLUTION/ DROPS OPHTHALMIC at 18:29

## 2023-03-27 RX ADMIN — Medication 975 MILLIGRAM(S): at 14:16

## 2023-03-27 RX ADMIN — Medication 1 APPLICATION(S): at 18:29

## 2023-03-27 RX ADMIN — OXYCODONE HYDROCHLORIDE 10 MILLIGRAM(S): 5 TABLET ORAL at 06:05

## 2023-03-27 RX ADMIN — Medication 1 APPLICATION(S): at 05:30

## 2023-03-27 RX ADMIN — Medication 975 MILLIGRAM(S): at 05:30

## 2023-03-27 RX ADMIN — OXYCODONE HYDROCHLORIDE 10 MILLIGRAM(S): 5 TABLET ORAL at 00:10

## 2023-03-27 RX ADMIN — Medication 975 MILLIGRAM(S): at 14:15

## 2023-03-27 RX ADMIN — Medication 975 MILLIGRAM(S): at 23:24

## 2023-03-27 RX ADMIN — ERYTHROPOIETIN 10000 UNIT(S): 10000 INJECTION, SOLUTION INTRAVENOUS; SUBCUTANEOUS at 07:57

## 2023-03-27 RX ADMIN — OXYCODONE HYDROCHLORIDE 10 MILLIGRAM(S): 5 TABLET ORAL at 05:35

## 2023-03-27 RX ADMIN — INSULIN GLARGINE 4 UNIT(S): 100 INJECTION, SOLUTION SUBCUTANEOUS at 23:24

## 2023-03-27 RX ADMIN — DORZOLAMIDE HYDROCHLORIDE TIMOLOL MALEATE 1 DROP(S): 20; 5 SOLUTION/ DROPS OPHTHALMIC at 05:29

## 2023-03-27 RX ADMIN — CHLORHEXIDINE GLUCONATE 1 APPLICATION(S): 213 SOLUTION TOPICAL at 11:04

## 2023-03-27 RX ADMIN — Medication 81 MILLIGRAM(S): at 11:07

## 2023-03-27 NOTE — PROGRESS NOTE ADULT - PROBLEM SELECTOR PLAN 6
- DVT ppx: ASA per ortho  - difficult disposition - no insurance, not medically optimized at this time as per ortho.

## 2023-03-27 NOTE — PROGRESS NOTE ADULT - ASSESSMENT
This is a 46yo Kittitian speaking male with PMH of DM2, b/l BKA, ESRD (on HD MWF) well known to Orthopedics for treatment of right third finger 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 Sin/Dr. Singh) who was seen by Dr. Sin today in the office and sent in to [facility] for urgent irrigation and debridement. Patient states he missed dialysis today because he was instructed to come straight to the ED.     PAST MEDICAL & SURGICAL HISTORY:  ESRD on dialysis  H/O hypotension  Diabetes mellitus  S/P BKA (below knee amputation) bilateral  AV fistula (06 Mar 2023 17:32)            esrd on hd   will plan for hd as order    Excess fluids and waste products will be removed from your blood; your electrolytes will be balanced; your blood pressure will be controlled.      ANEMIA PLAN:  Anemia of chronic disease:  We will continue epo aiming for a HCT of 32-36 %.   We will monitor Iron stores, B12 and RBC folate .      ,send blood and urine cx,serial lactate levels,monitor vitals skyla sanchez hydration,monitor urine output and renal profile,iv abx

## 2023-03-27 NOTE — PROGRESS NOTE ADULT - ASSESSMENT
47M w/ DM c/b ESRD (HD MWF), PAD (s/p bilateral BKA), admitted with vascular steal syndrome c/b R middle finger and forearm gas gangrene s/p amputation and debridement (1-2/2023) 47M w/ DM c/b ESRD (HD MWF), PAD (s/p bilateral BKA), admitted with vascular steal syndrome c/b R middle finger and forearm gas gangrene s/p amputation and multiple debridements (last 3/16/23) and with associated OM currently stabilized on IV antibiotics.

## 2023-03-27 NOTE — PROGRESS NOTE ADULT - PROBLEM SELECTOR PLAN 2
Resolved  -covid vaccinated x2  -asymptomatic and CXR clear  -completed 3 day course of remdesivir on 3/9/23  -now off isolation

## 2023-03-27 NOTE — PROGRESS NOTE ADULT - PROBLEM SELECTOR PLAN 3
-likely secondary to post-op changes; pt hemodynamically stable; Hemoglobin has fluctuated throughout hospitalization, currently stable.

## 2023-03-27 NOTE — PROGRESS NOTE ADULT - PROBLEM SELECTOR PLAN 4
-renal consulted for HD - MWF  -renally dose medications   -anemia of renal disease, c/w epo during HD as per renal  -renal restricted diet w/ nepro supplements

## 2023-03-27 NOTE — PROGRESS NOTE ADULT - SUBJECTIVE AND OBJECTIVE BOX
Patient seen and examined at bedside. Reports no acute complaints at this time. Pain is well controlled. No acute events overnight. In dialysis    ICU Vital Signs Last 24 Hrs  T(C): 36.5 (27 Mar 2023 05:39), Max: 36.8 (26 Mar 2023 09:34)  T(F): 97.7 (27 Mar 2023 05:39), Max: 98.3 (26 Mar 2023 09:34)  HR: 60 (27 Mar 2023 05:39) (60 - 70)  BP: 137/63 (27 Mar 2023 05:39) (119/54 - 157/65)  BP(mean): --  ABP: --  ABP(mean): --  RR: 18 (27 Mar 2023 05:39) (16 - 18)  SpO2: 100% (27 Mar 2023 05:39) (99% - 100%)    O2 Parameters below as of 27 Mar 2023 05:39  Patient On (Oxygen Delivery Method): room air            Physical exam:  Gen: Alert and Oriented x3, No Acute Distress  Right upper extremity : dry dressing c/d/i, cling, wwp  Distally perfused      A/P: 48 yo M sp multiple I&D of Right hand and forearm and Integra. Stable now in dry dressing placed and collagenase treatment.     DVT prophylaxis- ASA daily  Last dressing changed on 3/26  Pain control   Incentive spirometer  Continuing Unasyn til 4/17

## 2023-03-27 NOTE — PROGRESS NOTE ADULT - SUBJECTIVE AND OBJECTIVE BOX
[facility] Division of Hospital Medicine  Mckayla Suarez MD EdM  Pager [phone]  Also available on MS Teams    SUBJECTIVE / OVERNIGHT EVENTS:  No events overnight. Seen and examined at dialysis. Reports feeling well, no pain in hand, no chest pain or shortness of breath.     MEDICATIONS  (STANDING):  acetaminophen     Tablet .. 975 milliGRAM(s) Oral every 8 hours  ampicillin/sulbactam  IVPB 3 Gram(s) IV Intermittent every 24 hours  ampicillin/sulbactam  IVPB      aspirin  chewable 81 milliGRAM(s) Oral daily  chlorhexidine 2% Cloths 1 Application(s) Topical daily  collagenase Ointment 1 Application(s) Topical two times a day  dextrose 5%. 1000 milliLiter(s) (100 mL/Hr) IV Continuous <Continuous>  dextrose 5%. 1000 milliLiter(s) (50 mL/Hr) IV Continuous <Continuous>  dextrose 50% Injectable 25 Gram(s) IV Push once  dextrose 50% Injectable 12.5 Gram(s) IV Push once  dextrose 50% Injectable 25 Gram(s) IV Push once  dorzolamide 2%/timolol 0.5% Ophthalmic Solution 1 Drop(s) Both EYES two times a day  glucagon  Injectable 1 milliGRAM(s) IntraMuscular once  insulin glargine Injectable (LANTUS) 4 Unit(s) SubCutaneous at bedtime  insulin lispro (ADMELOG) corrective regimen sliding scale   SubCutaneous at bedtime  insulin lispro (ADMELOG) corrective regimen sliding scale   SubCutaneous three times a day before meals  senna 2 Tablet(s) Oral at bedtime    MEDICATIONS  (PRN):  dextrose Oral Gel 15 Gram(s) Oral once PRN Blood Glucose LESS THAN 70 milliGRAM(s)/deciliter  oxyCODONE    IR 5 milliGRAM(s) Oral every 4 hours PRN Moderate Pain (4 - 6)  oxyCODONE    IR 10 milliGRAM(s) Oral every 4 hours PRN Severe Pain (7 - 10)      I&O's Summary    26 Mar 2023 07:01  -  27 Mar 2023 07:00  --------------------------------------------------------  IN: 1620 mL / OUT: 0 mL / NET: 1620 mL    27 Mar 2023 07:01  -  27 Mar 2023 12:11  --------------------------------------------------------  IN: 400 mL / OUT: 2000 mL / NET: -1600 mL        PHYSICAL EXAM:  Vital Signs Last 24 Hrs  T(C): 36.7 (27 Mar 2023 09:31), Max: 36.7 (27 Mar 2023 09:31)  T(F): 98 (27 Mar 2023 09:31), Max: 98 (27 Mar 2023 09:31)  HR: 66 (27 Mar 2023 09:31) (60 - 70)  BP: 147/79 (27 Mar 2023 09:31) (119/54 - 157/65)  BP(mean): --  RR: 17 (27 Mar 2023 09:31) (16 - 18)  SpO2: 100% (27 Mar 2023 09:31) (99% - 100%)    Parameters below as of 27 Mar 2023 09:31  Patient On (Oxygen Delivery Method): room air      CONSTITUTIONAL: no acute distress, pleasant and conversant  EYES: conjunctiva and sclera clear  ENMT: Moist oral mucosa  RESPIRATORY: Normal respiratory effort; lungs are clear to auscultation bilaterally  CARDIOVASCULAR: Regular rate and rhythm, normal S1 and S2, no murmur/rub/gallop; No lower extremity edema  ABDOMEN: no tenderness to palpation, normoactive bowel sounds, no rebound/guarding  PSYCH: Alert; affect appropriate  NEUROLOGY: CN 2-12 are intact and symmetric; moving all extremities   MSK: s/p bilateral BKA, right hand wound dressing c/d/i  SKIN: No rashes on examined skin    LABS:                        7.9    6.44  )-----------( 229      ( 27 Mar 2023 06:30 )             26.6     03-27    137  |  96<L>  |  87<H>  ----------------------------<  90  5.4<H>   |  19<L>  |  8.72<H>    Ca    8.2<L>      27 Mar 2023 06:30                COVID-19 PCR: NotDetec (14 Mar 2023 19:47)  COVID-19 PCR: Detected (06 Mar 2023 18:00)  COVID-19 PCR: NotDetec (23 Feb 2023 11:47)  COVID-19 PCR: NotDetec (16 Feb 2023 07:05)  COVID-19 PCR: NotDetec (13 Feb 2023 07:36)  COVID-19 PCR: NotDetec (06 Feb 2023 09:14)  COVID-19 PCR: NotDetec (03 Feb 2023 12:15)  COVID-19 PCR: NotDetec (01 Feb 2023 11:47)  COVID-19 PCR: NotDetec (23 Jan 2023 13:06)  COVID-19 PCR: NotDetec (18 Jan 2023 21:06)  COVID-19 PCR: NotDetec (16 Jan 2023 02:44)  SARS-CoV-2: NotDetec (12 Jan 2023 18:16)  COVID-19 PCR: NotDetec (10 Sudeep 2023 00:27)      RADIOLOGY & ADDITIONAL TESTS:  New Imaging Personally Reviewed Today:  New Electrocardiogram Personally Reviewed Today:  Prior or Outpatient Records Reviewed Today with Summary:    COORDINATION OF CARE:  Consultant Communication and Details of Discussion (where applicable): Discussed with interdisciplinary team at [facility]    ASSESSMENT FROM INDEPENDENT HISTORIAN:  Discussed with ***

## 2023-03-27 NOTE — PROGRESS NOTE ADULT - PROBLEM SELECTOR PLAN 5
-diabetic retinopathy.  severe proliferative diabetic retinopathy, previously on cosopt, ophtho outpt follow up   -QeP2s=1.5%  -c/w lantus 4 and ss. will titrate up as necessary  -c/w ASA daily  -has indication for statin, will investigate whether there is a reason he is not currently prescribed and if no reason recommend starting atorvastatin 40 mg daily

## 2023-03-27 NOTE — PROGRESS NOTE ADULT - SUBJECTIVE AND OBJECTIVE BOX
Patient is a 47y Male whom presented to the hospital with esrd on hd    PAST MEDICAL & SURGICAL HISTORY:  ESRD on dialysis      H/O hypotension      Diabetes mellitus      S/P BKA (below knee amputation) bilateral      AV fistula          MEDICATIONS  (STANDING):  acetaminophen     Tablet .. 975 milliGRAM(s) Oral every 8 hours  ampicillin/sulbactam  IVPB      aspirin  chewable 81 milliGRAM(s) Oral daily  dextrose 5%. 1000 milliLiter(s) (50 mL/Hr) IV Continuous <Continuous>  dextrose 5%. 1000 milliLiter(s) (100 mL/Hr) IV Continuous <Continuous>  dextrose 50% Injectable 25 Gram(s) IV Push once  dextrose 50% Injectable 12.5 Gram(s) IV Push once  dextrose 50% Injectable 25 Gram(s) IV Push once  glucagon  Injectable 1 milliGRAM(s) IntraMuscular once  insulin lispro (ADMELOG) corrective regimen sliding scale   SubCutaneous three times a day before meals  lactated ringers. 1000 milliLiter(s) (100 mL/Hr) IV Continuous <Continuous>      Allergies    No Known Drug Allergies  [place] (Rash)    Intolerances        SOCIAL HISTORY:  Denies ETOh,Smoking,     FAMILY HISTORY:  No pertinent family history in first degree relatives        REVIEW OF SYSTEMS:    CONSTITUTIONAL: No weakness, fevers or chills                                                   7.9    6.44  )-----------( 229      ( 27 Mar 2023 06:30 )             26.6       CBC Full  -  ( 27 Mar 2023 06:30 )  WBC Count : 6.44 K/uL  RBC Count : 2.99 M/uL  Hemoglobin : 7.9 g/dL  Hematocrit : 26.6 %  Platelet Count - Automated : 229 K/uL  Mean Cell Volume : 89.0 fL  Mean Cell Hemoglobin : 26.4 pg  Mean Cell Hemoglobin Concentration : 29.7 gm/dL  Auto Neutrophil # : x  Auto Lymphocyte # : x  Auto Monocyte # : x  Auto Eosinophil # : x  Auto Basophil # : x  Auto Neutrophil % : x  Auto Lymphocyte % : x  Auto Monocyte % : x  Auto Eosinophil % : x  Auto Basophil % : x      03-27    137  |  96<L>  |  87<H>  ----------------------------<  90  5.4<H>   |  19<L>  |  8.72<H>    Ca    8.2<L>      27 Mar 2023 06:30        CAPILLARY BLOOD GLUCOSE  99 (27 Mar 2023 11:30)      POCT Blood Glucose.: 90 mg/dL (27 Mar 2023 08:14)  POCT Blood Glucose.: 88 mg/dL (27 Mar 2023 06:02)  POCT Blood Glucose.: 194 mg/dL (26 Mar 2023 21:35)  POCT Blood Glucose.: 217 mg/dL (26 Mar 2023 21:33)  POCT Blood Glucose.: 149 mg/dL (26 Mar 2023 16:45)      Vital Signs Last 24 Hrs  T(C): 36.7 (27 Mar 2023 09:31), Max: 36.7 (27 Mar 2023 09:31)  T(F): 98 (27 Mar 2023 09:31), Max: 98 (27 Mar 2023 09:31)  HR: 66 (27 Mar 2023 09:31) (60 - 70)  BP: 147/79 (27 Mar 2023 09:31) (119/54 - 157/65)  BP(mean): --  RR: 17 (27 Mar 2023 09:31) (16 - 18)  SpO2: 100% (27 Mar 2023 09:31) (99% - 100%)    Parameters below as of 27 Mar 2023 09:31  Patient On (Oxygen Delivery Method): room air                    PHYSICAL EXAM:    Constitutional: NAD  HEENT: conjunctive   clear   Neck:  No JVD  Respiratory: CTAB  Cardiovascular: S1 and S2  Gastrointestinal: BS+, soft, NT/ND  Extremities: No peripheral edema ,  Prior right 3rd digit amputation.S/P BKA (below knee amputation) bilateral  Access: pos fistula

## 2023-03-28 LAB
GLUCOSE BLDC GLUCOMTR-MCNC: 105 MG/DL — HIGH (ref 70–99)
GLUCOSE BLDC GLUCOMTR-MCNC: 129 MG/DL — HIGH (ref 70–99)
GLUCOSE BLDC GLUCOMTR-MCNC: 154 MG/DL — HIGH (ref 70–99)
GLUCOSE BLDC GLUCOMTR-MCNC: 197 MG/DL — HIGH (ref 70–99)

## 2023-03-28 PROCEDURE — 99232 SBSQ HOSP IP/OBS MODERATE 35: CPT

## 2023-03-28 RX ADMIN — DORZOLAMIDE HYDROCHLORIDE TIMOLOL MALEATE 1 DROP(S): 20; 5 SOLUTION/ DROPS OPHTHALMIC at 19:11

## 2023-03-28 RX ADMIN — Medication 1 APPLICATION(S): at 06:36

## 2023-03-28 RX ADMIN — Medication 975 MILLIGRAM(S): at 06:36

## 2023-03-28 RX ADMIN — INSULIN GLARGINE 4 UNIT(S): 100 INJECTION, SOLUTION SUBCUTANEOUS at 22:38

## 2023-03-28 RX ADMIN — Medication 975 MILLIGRAM(S): at 13:35

## 2023-03-28 RX ADMIN — Medication 81 MILLIGRAM(S): at 13:35

## 2023-03-28 RX ADMIN — Medication 975 MILLIGRAM(S): at 22:27

## 2023-03-28 RX ADMIN — Medication 975 MILLIGRAM(S): at 07:41

## 2023-03-28 RX ADMIN — Medication 975 MILLIGRAM(S): at 00:24

## 2023-03-28 RX ADMIN — AMPICILLIN SODIUM AND SULBACTAM SODIUM 200 GRAM(S): 250; 125 INJECTION, POWDER, FOR SUSPENSION INTRAMUSCULAR; INTRAVENOUS at 15:00

## 2023-03-28 RX ADMIN — Medication 2: at 11:49

## 2023-03-28 RX ADMIN — Medication 975 MILLIGRAM(S): at 23:27

## 2023-03-28 RX ADMIN — Medication 1 APPLICATION(S): at 19:16

## 2023-03-28 RX ADMIN — DORZOLAMIDE HYDROCHLORIDE TIMOLOL MALEATE 1 DROP(S): 20; 5 SOLUTION/ DROPS OPHTHALMIC at 06:36

## 2023-03-28 RX ADMIN — Medication 975 MILLIGRAM(S): at 14:35

## 2023-03-28 NOTE — PROGRESS NOTE ADULT - SUBJECTIVE AND OBJECTIVE BOX
[facility] Division of Hospital Medicine  Mckayla Suarez MD EdM  Pager [phone]  Also available on MS Teams    SUBJECTIVE / OVERNIGHT EVENTS:  No events overnight. Reports feeling well this morning.   Showed me paperwork for appointment at [place] for renal transplant evaluation on 4/20 and requests discharge by 4/14 in order to facilitate getting to this appointment.    MEDICATIONS  (STANDING):  acetaminophen     Tablet .. 975 milliGRAM(s) Oral every 8 hours  ampicillin/sulbactam  IVPB 3 Gram(s) IV Intermittent every 24 hours  ampicillin/sulbactam  IVPB      aspirin  chewable 81 milliGRAM(s) Oral daily  collagenase Ointment 1 Application(s) Topical two times a day  dextrose 5%. 1000 milliLiter(s) (100 mL/Hr) IV Continuous <Continuous>  dextrose 5%. 1000 milliLiter(s) (50 mL/Hr) IV Continuous <Continuous>  dextrose 50% Injectable 25 Gram(s) IV Push once  dextrose 50% Injectable 12.5 Gram(s) IV Push once  dextrose 50% Injectable 25 Gram(s) IV Push once  dorzolamide 2%/timolol 0.5% Ophthalmic Solution 1 Drop(s) Both EYES two times a day  glucagon  Injectable 1 milliGRAM(s) IntraMuscular once  insulin glargine Injectable (LANTUS) 4 Unit(s) SubCutaneous at bedtime  insulin lispro (ADMELOG) corrective regimen sliding scale   SubCutaneous three times a day before meals  insulin lispro (ADMELOG) corrective regimen sliding scale   SubCutaneous at bedtime  senna 2 Tablet(s) Oral at bedtime    MEDICATIONS  (PRN):  dextrose Oral Gel 15 Gram(s) Oral once PRN Blood Glucose LESS THAN 70 milliGRAM(s)/deciliter  oxyCODONE    IR 5 milliGRAM(s) Oral every 4 hours PRN Moderate Pain (4 - 6)  oxyCODONE    IR 10 milliGRAM(s) Oral every 4 hours PRN Severe Pain (7 - 10)      I&O's Summary    27 Mar 2023 07:01  -  28 Mar 2023 07:00  --------------------------------------------------------  IN: 400 mL / OUT: 2000 mL / NET: -1600 mL        PHYSICAL EXAM:  Vital Signs Last 24 Hrs  T(C): 36.8 (28 Mar 2023 10:09), Max: 37.1 (27 Mar 2023 21:38)  T(F): 98.2 (28 Mar 2023 10:09), Max: 98.7 (27 Mar 2023 21:38)  HR: 64 (28 Mar 2023 10:09) (61 - 69)  BP: 159/70 (28 Mar 2023 10:09) (145/64 - 167/83)  BP(mean): --  RR: 18 (28 Mar 2023 10:09) (16 - 18)  SpO2: 98% (28 Mar 2023 10:09) (98% - 100%)    Parameters below as of 28 Mar 2023 10:09  Patient On (Oxygen Delivery Method): room air      CONSTITUTIONAL: no acute distress, pleasant and conversant  EYES: conjunctiva and sclera clear  ENMT: Moist oral mucosa  RESPIRATORY: Normal respiratory effort; lungs are clear to auscultation bilaterally  CARDIOVASCULAR: Regular rate and rhythm, normal S1 and S2, no murmur/rub/gallop; No lower extremity edema  ABDOMEN: no tenderness to palpation, normoactive bowel sounds, no rebound/guarding  PSYCH: Alert; affect appropriate  NEUROLOGY: CN 2-12 are intact and symmetric; moving all extremities   SKIN: No rashes on examined skin    LABS:                        7.9    6.44  )-----------( 229      ( 27 Mar 2023 06:30 )             26.6     03-27    137  |  96<L>  |  87<H>  ----------------------------<  90  5.4<H>   |  19<L>  |  8.72<H>    Ca    8.2<L>      27 Mar 2023 06:30                COVID-19 PCR: NotDetec (14 Mar 2023 19:47)  COVID-19 PCR: Detected (06 Mar 2023 18:00)  COVID-19 PCR: NotDetec (23 Feb 2023 11:47)  COVID-19 PCR: NotDetec (16 Feb 2023 07:05)  COVID-19 PCR: NotDetec (13 Feb 2023 07:36)  COVID-19 PCR: NotDetec (06 Feb 2023 09:14)  COVID-19 PCR: NotDetec (03 Feb 2023 12:15)  COVID-19 PCR: NotDetec (01 Feb 2023 11:47)  COVID-19 PCR: NotDetec (23 Jan 2023 13:06)  COVID-19 PCR: NotDetec (18 Jan 2023 21:06)  COVID-19 PCR: NotDetec (16 Jan 2023 02:44)  SARS-CoV-2: NotDetec (12 Jan 2023 18:16)  COVID-19 PCR: NotDetec (10 Sudeep 2023 00:27)        COORDINATION OF CARE:  Consultant Communication and Details of Discussion (where applicable): Discussed with primary team

## 2023-03-28 NOTE — PROGRESS NOTE ADULT - PROBLEM SELECTOR PLAN 5
-diabetic retinopathy.  severe proliferative diabetic retinopathy, previously on cosopt, ophtho outpt follow up   -UfC9q=3.5%  -c/w lantus 4 and ss. will titrate up as necessary  -c/w ASA daily  -has indication for statin, will investigate whether there is a reason he is not currently prescribed and if no reason recommend starting atorvastatin 40 mg daily

## 2023-03-28 NOTE — PROGRESS NOTE ADULT - ASSESSMENT
This is a 46yo [place]egalese speaking male with PMH of DM2, b/l BKA, ESRD (on HD MWF) well known to Orthopedics for treatment of right third finger 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 Sin/Dr. Singh) who was seen by Dr. Sin today in the office and sent in to [facility] for urgent irrigation and debridement. Patient states he missed dialysis today because he was instructed to come straight to the ED.     PAST MEDICAL & SURGICAL HISTORY:  ESRD on dialysis  H/O hypotension  Diabetes mellitus  S/P BKA (below knee amputation) bilateral  AV fistula (06 Mar 2023 17:32)            esrd on hd , will plan for hd as order    Excess fluids and waste products will be removed from your blood; your electrolytes will be balanced; your blood pressure will be controlled.      ANEMIA PLAN:  Anemia of chronic disease:  We will continue epo aiming for a HCT of 32-36 %.   We will monitor Iron stores, B12 and RBC folate .      send blood and urine cx,serial lactate levels,monitor vitals skyla sanchez hydration,monitor urine output and renal profile,iv abx

## 2023-03-28 NOTE — PROGRESS NOTE ADULT - ASSESSMENT
47M w/ DM c/b ESRD (HD MWF), PAD (s/p bilateral BKA), admitted with vascular steal syndrome c/b R middle finger and forearm gas gangrene s/p amputation and multiple debridements (last 3/16/23) and with associated OM currently stabilized on IV antibiotics.

## 2023-03-28 NOTE — PROGRESS NOTE ADULT - PROBLEM SELECTOR PLAN 4
-renal consulted for HD - MWF  -renally dose medications   -anemia of renal disease, c/w epo during HD as per renal  -renal restricted diet w/ nepro supplements    Note- patient has appointment at [place] for transplant evaluation on 4/20 and requesting discharge by 4/14 to facilitate getting to this appointment

## 2023-03-28 NOTE — PROGRESS NOTE ADULT - SUBJECTIVE AND OBJECTIVE BOX
Patient is a 47y Male whom presented to the hospital with esrd on hd    PAST MEDICAL & SURGICAL HISTORY:  ESRD on dialysis      H/O hypotension      Diabetes mellitus      S/P BKA (below knee amputation) bilateral      AV fistula          MEDICATIONS  (STANDING):  acetaminophen     Tablet .. 975 milliGRAM(s) Oral every 8 hours  ampicillin/sulbactam  IVPB      aspirin  chewable 81 milliGRAM(s) Oral daily  dextrose 5%. 1000 milliLiter(s) (50 mL/Hr) IV Continuous <Continuous>  dextrose 5%. 1000 milliLiter(s) (100 mL/Hr) IV Continuous <Continuous>  dextrose 50% Injectable 25 Gram(s) IV Push once  dextrose 50% Injectable 12.5 Gram(s) IV Push once  dextrose 50% Injectable 25 Gram(s) IV Push once  glucagon  Injectable 1 milliGRAM(s) IntraMuscular once  insulin lispro (ADMELOG) corrective regimen sliding scale   SubCutaneous three times a day before meals  lactated ringers. 1000 milliLiter(s) (100 mL/Hr) IV Continuous <Continuous>      Allergies    No Known Drug Allergies  [place] (Rash)    Intolerances        SOCIAL HISTORY:  Denies ETOh,Smoking,     FAMILY HISTORY:  No pertinent family history in first degree relatives        REVIEW OF SYSTEMS:    CONSTITUTIONAL: No weakness, fevers or chills                                               7.9    6.44  )-----------( 229      ( 27 Mar 2023 06:30 )             26.6       CBC Full  -  ( 27 Mar 2023 06:30 )  WBC Count : 6.44 K/uL  RBC Count : 2.99 M/uL  Hemoglobin : 7.9 g/dL  Hematocrit : 26.6 %  Platelet Count - Automated : 229 K/uL  Mean Cell Volume : 89.0 fL  Mean Cell Hemoglobin : 26.4 pg  Mean Cell Hemoglobin Concentration : 29.7 gm/dL  Auto Neutrophil # : x  Auto Lymphocyte # : x  Auto Monocyte # : x  Auto Eosinophil # : x  Auto Basophil # : x  Auto Neutrophil % : x  Auto Lymphocyte % : x  Auto Monocyte % : x  Auto Eosinophil % : x  Auto Basophil % : x      03-27    137  |  96<L>  |  87<H>  ----------------------------<  90  5.4<H>   |  19<L>  |  8.72<H>    Ca    8.2<L>      27 Mar 2023 06:30        CAPILLARY BLOOD GLUCOSE  99 (27 Mar 2023 11:30)      POCT Blood Glucose.: 105 mg/dL (28 Mar 2023 07:13)  POCT Blood Glucose.: 159 mg/dL (27 Mar 2023 22:35)  POCT Blood Glucose.: 99 mg/dL (27 Mar 2023 16:51)      Vital Signs Last 24 Hrs  T(C): 36.8 (28 Mar 2023 10:09), Max: 37.1 (27 Mar 2023 21:38)  T(F): 98.2 (28 Mar 2023 10:09), Max: 98.7 (27 Mar 2023 21:38)  HR: 64 (28 Mar 2023 10:09) (61 - 71)  BP: 159/70 (28 Mar 2023 10:09) (145/64 - 167/83)  BP(mean): --  RR: 18 (28 Mar 2023 10:09) (16 - 18)  SpO2: 98% (28 Mar 2023 10:09) (98% - 100%)    Parameters below as of 28 Mar 2023 10:09  Patient On (Oxygen Delivery Method): room air                    PHYSICAL EXAM:    Constitutional: NAD  HEENT: conjunctive   clear   Neck:  No JVD  Respiratory: CTAB  Cardiovascular: S1 and S2  Gastrointestinal: BS+, soft, NT/ND  Extremities: No peripheral edema ,  Prior right 3rd digit amputation.S/P BKA (below knee amputation) bilateral  Access: pos fistula

## 2023-03-28 NOTE — PROGRESS NOTE ADULT - SUBJECTIVE AND OBJECTIVE BOX
Pt seen/examined. Doing well. Pain controlled. No acute overnight complaints or events.    T(C): 36.8 (03-28-23 @ 01:40), Max: 37.1 (03-27-23 @ 21:38)  HR: 69 (03-28-23 @ 02:22) (60 - 71)  BP: 145/64 (03-28-23 @ 02:22) (145/64 - 167/83)  RR: 16 (03-28-23 @ 01:40) (16 - 17)  SpO2: 100% (03-28-23 @ 01:40) (100% - 100%)  Wt(kg): --  - Gen: NAD    Physical exam:  Gen: Alert and Oriented x3, No Acute Distress  Right upper extremity : dry dressing c/d/i, gold, ace wrap, wwp  Distally perfused    A/P: 48 yo M sp multiple I&D of Right hand and forearm and Integra. Stable now in dry dressing placed and collagenase treatment.     DVT prophylaxis- ASA daily  Dressing changed today, next 3/30  Pain control   Incentive spirometer  Continuing Unasyn til 4/17

## 2023-03-29 LAB
ANION GAP SERPL CALC-SCNC: 20 MMOL/L — HIGH (ref 7–14)
BUN SERPL-MCNC: 68 MG/DL — HIGH (ref 7–23)
CALCIUM SERPL-MCNC: 8.1 MG/DL — LOW (ref 8.4–10.5)
CHLORIDE SERPL-SCNC: 95 MMOL/L — LOW (ref 98–107)
CO2 SERPL-SCNC: 21 MMOL/L — LOW (ref 22–31)
CREAT SERPL-MCNC: 8.08 MG/DL — HIGH (ref 0.5–1.3)
EGFR: 8 ML/MIN/1.73M2 — LOW
GLUCOSE BLDC GLUCOMTR-MCNC: 108 MG/DL — HIGH (ref 70–99)
GLUCOSE BLDC GLUCOMTR-MCNC: 141 MG/DL — HIGH (ref 70–99)
GLUCOSE BLDC GLUCOMTR-MCNC: 159 MG/DL — HIGH (ref 70–99)
GLUCOSE BLDC GLUCOMTR-MCNC: 84 MG/DL — SIGNIFICANT CHANGE UP (ref 70–99)
GLUCOSE BLDC GLUCOMTR-MCNC: 98 MG/DL — SIGNIFICANT CHANGE UP (ref 70–99)
GLUCOSE SERPL-MCNC: 108 MG/DL — HIGH (ref 70–99)
HCT VFR BLD CALC: 25.5 % — LOW (ref 39–50)
HGB BLD-MCNC: 7.9 G/DL — LOW (ref 13–17)
MCHC RBC-ENTMCNC: 26.6 PG — LOW (ref 27–34)
MCHC RBC-ENTMCNC: 31 GM/DL — LOW (ref 32–36)
MCV RBC AUTO: 85.9 FL — SIGNIFICANT CHANGE UP (ref 80–100)
NRBC # BLD: 0 /100 WBCS — SIGNIFICANT CHANGE UP (ref 0–0)
NRBC # FLD: 0 K/UL — SIGNIFICANT CHANGE UP (ref 0–0)
PLATELET # BLD AUTO: 222 K/UL — SIGNIFICANT CHANGE UP (ref 150–400)
POTASSIUM SERPL-MCNC: 4.8 MMOL/L — SIGNIFICANT CHANGE UP (ref 3.5–5.3)
POTASSIUM SERPL-SCNC: 4.8 MMOL/L — SIGNIFICANT CHANGE UP (ref 3.5–5.3)
RBC # BLD: 2.97 M/UL — LOW (ref 4.2–5.8)
RBC # FLD: 14.8 % — HIGH (ref 10.3–14.5)
SODIUM SERPL-SCNC: 136 MMOL/L — SIGNIFICANT CHANGE UP (ref 135–145)
WBC # BLD: 5.44 K/UL — SIGNIFICANT CHANGE UP (ref 3.8–10.5)
WBC # FLD AUTO: 5.44 K/UL — SIGNIFICANT CHANGE UP (ref 3.8–10.5)

## 2023-03-29 PROCEDURE — 99232 SBSQ HOSP IP/OBS MODERATE 35: CPT

## 2023-03-29 RX ORDER — PETROLATUM,WHITE
1 JELLY (GRAM) TOPICAL
Refills: 0 | Status: DISCONTINUED | OUTPATIENT
Start: 2023-03-29 | End: 2023-04-14

## 2023-03-29 RX ADMIN — Medication 2: at 16:58

## 2023-03-29 RX ADMIN — Medication 975 MILLIGRAM(S): at 23:13

## 2023-03-29 RX ADMIN — Medication 975 MILLIGRAM(S): at 14:11

## 2023-03-29 RX ADMIN — Medication 1 APPLICATION(S): at 19:35

## 2023-03-29 RX ADMIN — Medication 1 APPLICATION(S): at 19:36

## 2023-03-29 RX ADMIN — Medication 975 MILLIGRAM(S): at 22:00

## 2023-03-29 RX ADMIN — Medication 1 APPLICATION(S): at 05:34

## 2023-03-29 RX ADMIN — DORZOLAMIDE HYDROCHLORIDE TIMOLOL MALEATE 1 DROP(S): 20; 5 SOLUTION/ DROPS OPHTHALMIC at 05:34

## 2023-03-29 RX ADMIN — Medication 975 MILLIGRAM(S): at 06:34

## 2023-03-29 RX ADMIN — Medication 975 MILLIGRAM(S): at 13:11

## 2023-03-29 RX ADMIN — DORZOLAMIDE HYDROCHLORIDE TIMOLOL MALEATE 1 DROP(S): 20; 5 SOLUTION/ DROPS OPHTHALMIC at 19:37

## 2023-03-29 RX ADMIN — Medication 81 MILLIGRAM(S): at 13:11

## 2023-03-29 RX ADMIN — INSULIN GLARGINE 4 UNIT(S): 100 INJECTION, SOLUTION SUBCUTANEOUS at 22:00

## 2023-03-29 RX ADMIN — AMPICILLIN SODIUM AND SULBACTAM SODIUM 200 GRAM(S): 250; 125 INJECTION, POWDER, FOR SUSPENSION INTRAMUSCULAR; INTRAVENOUS at 15:33

## 2023-03-29 RX ADMIN — Medication 975 MILLIGRAM(S): at 05:34

## 2023-03-29 NOTE — PROGRESS NOTE ADULT - SUBJECTIVE AND OBJECTIVE BOX
Patient is a 47y Male whom presented to the hospital with esrd on hd    PAST MEDICAL & SURGICAL HISTORY:  ESRD on dialysis      H/O hypotension      Diabetes mellitus      S/P BKA (below knee amputation) bilateral      AV fistula          MEDICATIONS  (STANDING):  acetaminophen     Tablet .. 975 milliGRAM(s) Oral every 8 hours  ampicillin/sulbactam  IVPB      aspirin  chewable 81 milliGRAM(s) Oral daily  dextrose 5%. 1000 milliLiter(s) (50 mL/Hr) IV Continuous <Continuous>  dextrose 5%. 1000 milliLiter(s) (100 mL/Hr) IV Continuous <Continuous>  dextrose 50% Injectable 25 Gram(s) IV Push once  dextrose 50% Injectable 12.5 Gram(s) IV Push once  dextrose 50% Injectable 25 Gram(s) IV Push once  glucagon  Injectable 1 milliGRAM(s) IntraMuscular once  insulin lispro (ADMELOG) corrective regimen sliding scale   SubCutaneous three times a day before meals  lactated ringers. 1000 milliLiter(s) (100 mL/Hr) IV Continuous <Continuous>      Allergies    No Known Drug Allergies  [place] (Rash)    Intolerances        SOCIAL HISTORY:  Denies ETOh,Smoking,     FAMILY HISTORY:  No pertinent family history in first degree relatives        REVIEW OF SYSTEMS:    CONSTITUTIONAL: No weakness, fevers or chills                                                                     7.9    5.44  )-----------( 222      ( 29 Mar 2023 06:45 )             25.5       CBC Full  -  ( 29 Mar 2023 06:45 )  WBC Count : 5.44 K/uL  RBC Count : 2.97 M/uL  Hemoglobin : 7.9 g/dL  Hematocrit : 25.5 %  Platelet Count - Automated : 222 K/uL  Mean Cell Volume : 85.9 fL  Mean Cell Hemoglobin : 26.6 pg  Mean Cell Hemoglobin Concentration : 31.0 gm/dL  Auto Neutrophil # : x  Auto Lymphocyte # : x  Auto Monocyte # : x  Auto Eosinophil # : x  Auto Basophil # : x  Auto Neutrophil % : x  Auto Lymphocyte % : x  Auto Monocyte % : x  Auto Eosinophil % : x  Auto Basophil % : x      03-29    136  |  95<L>  |  68<H>  ----------------------------<  108<H>  4.8   |  21<L>  |  8.08<H>    Ca    8.1<L>      29 Mar 2023 06:45        CAPILLARY BLOOD GLUCOSE      POCT Blood Glucose.: 159 mg/dL (29 Mar 2023 16:51)  POCT Blood Glucose.: 84 mg/dL (29 Mar 2023 11:15)  POCT Blood Glucose.: 98 mg/dL (29 Mar 2023 08:52)  POCT Blood Glucose.: 108 mg/dL (29 Mar 2023 05:43)  POCT Blood Glucose.: 197 mg/dL (28 Mar 2023 22:34)      Vital Signs Last 24 Hrs  T(C): 36.7 (29 Mar 2023 17:38), Max: 36.8 (29 Mar 2023 10:38)  T(F): 98.1 (29 Mar 2023 17:38), Max: 98.2 (29 Mar 2023 10:38)  HR: 71 (29 Mar 2023 17:38) (60 - 71)  BP: 181/77 (29 Mar 2023 17:38) (162/72 - 181/77)  BP(mean): --  RR: 16 (29 Mar 2023 17:38) (16 - 18)  SpO2: 96% (29 Mar 2023 17:38) (96% - 99%)    Parameters below as of 29 Mar 2023 17:38  Patient On (Oxygen Delivery Method): room air                        PHYSICAL EXAM:    Constitutional: NAD  HEENT: conjunctive   clear   Neck:  No JVD  Respiratory: CTAB  Cardiovascular: S1 and S2  Gastrointestinal: BS+, soft, NT/ND  Extremities: No peripheral edema ,  Prior right 3rd digit amputation.S/P BKA (below knee amputation) bilateral  Access: pos fistula

## 2023-03-29 NOTE — PROGRESS NOTE ADULT - ASSESSMENT
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 Sin/Dr. Singh) who was seen by Dr. Sin today in the office and sent in to [facility] for urgent irrigation and debridement. Patient states he missed dialysis today because he was instructed to come straight to the ED.     PAST MEDICAL & SURGICAL HISTORY:  ESRD on dialysis  H/O hypotension  Diabetes mellitus  S/P BKA (below knee amputation) bilateral  AV fistula (06 Mar 2023 17:32)            esrd on hd , will plan for hd as order    Excess fluids and waste products will be removed from your blood; your electrolytes will be balanced; your blood pressure will be controlled.      ANEMIA PLAN:  Anemia of chronic disease:  We will continue epo aiming for a HCT of 32-36 %.   We will monitor Iron stores, B12 and RBC folate .      send blood and urine cx,serial lactate levels,monitor vitals skyla sanchez hydration,monitor urine output and renal profile,iv abx

## 2023-03-29 NOTE — PROGRESS NOTE ADULT - PROBLEM SELECTOR PLAN 5
-diabetic retinopathy.  severe proliferative diabetic retinopathy, previously on cosopt, ophtho outpt follow up   -UeA4b=0.5%  -c/w lantus 4 and ss. will titrate up as necessary  -c/w ASA daily  -has indication for statin, will investigate whether there is a reason he is not currently prescribed and if no reason recommend starting atorvastatin 40 mg daily

## 2023-03-29 NOTE — PROGRESS NOTE ADULT - PROBLEM SELECTOR PLAN 4
-renal consulted for HD - MWF  -renally dose medications   -anemia of renal disease, c/w epo during HD as per renal  -renal restricted diet w/ nepro supplements    Note- patient has appointment at [facility] for transplant evaluation on 4/20 and requesting discharge by 4/14 to facilitate getting to this appointment

## 2023-03-29 NOTE — PROGRESS NOTE ADULT - SUBJECTIVE AND OBJECTIVE BOX
ORTHO PROGRESS NOTE     Patient resting comfortable without complaint. Dressings changed yesterday, due for new dressing change 3/30.      Vital Signs Last 24 Hrs  T(C): 36.3 (29 Mar 2023 05:43), Max: 36.9 (28 Mar 2023 06:46)  T(F): 97.4 (29 Mar 2023 05:43), Max: 98.5 (28 Mar 2023 06:46)  HR: 64 (29 Mar 2023 05:43) (61 - 65)  BP: 162/72 (29 Mar 2023 05:43) (147/65 - 170/76)  BP(mean): --  RR: 18 (29 Mar 2023 05:43) (17 - 18)  SpO2: 97% (29 Mar 2023 05:43) (97% - 100%)    Parameters below as of 29 Mar 2023 05:43  Patient On (Oxygen Delivery Method): room air          RUE: Dressing clean/dry/intact                       A/P:  Stable             PT- NWB RUE             Unasyn- ASA daily/ venodynes             Follow up HD labs             Pain control             Incentive spirometer

## 2023-03-29 NOTE — PROGRESS NOTE ADULT - SUBJECTIVE AND OBJECTIVE BOX
[facility] Division of Hospital Medicine  Mckayla Suarez MD EdM  Pager [phone]  Also available on MS Teams    SUBJECTIVE / OVERNIGHT EVENTS:  No events overnight  Went for HD today  Feeling well, no new issues    MEDICATIONS  (STANDING):  acetaminophen     Tablet .. 975 milliGRAM(s) Oral every 8 hours  ampicillin/sulbactam  IVPB 3 Gram(s) IV Intermittent every 24 hours  ampicillin/sulbactam  IVPB      aspirin  chewable 81 milliGRAM(s) Oral daily  collagenase Ointment 1 Application(s) Topical two times a day  dextrose 5%. 1000 milliLiter(s) (50 mL/Hr) IV Continuous <Continuous>  dextrose 5%. 1000 milliLiter(s) (100 mL/Hr) IV Continuous <Continuous>  dextrose 50% Injectable 25 Gram(s) IV Push once  dextrose 50% Injectable 12.5 Gram(s) IV Push once  dextrose 50% Injectable 25 Gram(s) IV Push once  dorzolamide 2%/timolol 0.5% Ophthalmic Solution 1 Drop(s) Both EYES two times a day  glucagon  Injectable 1 milliGRAM(s) IntraMuscular once  insulin glargine Injectable (LANTUS) 4 Unit(s) SubCutaneous at bedtime  insulin lispro (ADMELOG) corrective regimen sliding scale   SubCutaneous at bedtime  insulin lispro (ADMELOG) corrective regimen sliding scale   SubCutaneous three times a day before meals  senna 2 Tablet(s) Oral at bedtime  Sucroferric Oxyhydroxide 2500mg Tablet 2 Tablet(s) 2 Tablet(s) Oral three times a day    MEDICATIONS  (PRN):  dextrose Oral Gel 15 Gram(s) Oral once PRN Blood Glucose LESS THAN 70 milliGRAM(s)/deciliter  oxyCODONE    IR 5 milliGRAM(s) Oral every 4 hours PRN Moderate Pain (4 - 6)  oxyCODONE    IR 10 milliGRAM(s) Oral every 4 hours PRN Severe Pain (7 - 10)      I&O's Summary    29 Mar 2023 07:01  -  29 Mar 2023 15:20  --------------------------------------------------------  IN: 400 mL / OUT: 2400 mL / NET: -2000 mL        PHYSICAL EXAM:  Vital Signs Last 24 Hrs  T(C): 36.8 (29 Mar 2023 10:38), Max: 36.8 (29 Mar 2023 10:38)  T(F): 98.2 (29 Mar 2023 10:38), Max: 98.2 (29 Mar 2023 10:38)  HR: 65 (29 Mar 2023 10:38) (60 - 65)  BP: 178/88 (29 Mar 2023 10:38) (162/72 - 178/88)  BP(mean): --  RR: 16 (29 Mar 2023 10:38) (16 - 18)  SpO2: 97% (29 Mar 2023 05:43) (97% - 99%)    Parameters below as of 29 Mar 2023 10:38  Patient On (Oxygen Delivery Method): room air      CONSTITUTIONAL: no acute distress, conversant  EYES: conjunctiva and sclera clear  ENMT: Moist oral mucosa  RESPIRATORY: Normal respiratory effort; lungs are clear to auscultation bilaterally  CARDIOVASCULAR: Regular rate and rhythm, normal S1 and S2, no murmur/rub/gallop; No lower extremity edema  ABDOMEN: no tenderness to palpation, normoactive bowel sounds, no rebound/guarding  MSK: R arm ace wrapped  PSYCH: Alert; affect appropriate  NEUROLOGY: CN 2-12 are intact and symmetric; moving all extremities   SKIN: No rashes on examined skin    LABS:                        7.9    5.44  )-----------( 222      ( 29 Mar 2023 06:45 )             25.5     03-29    136  |  95<L>  |  68<H>  ----------------------------<  108<H>  4.8   |  21<L>  |  8.08<H>    Ca    8.1<L>      29 Mar 2023 06:45                COVID-19 PCR: NotDetec (14 Mar 2023 19:47)  COVID-19 PCR: Detected (06 Mar 2023 18:00)  COVID-19 PCR: NotDetec (23 Feb 2023 11:47)  COVID-19 PCR: NotDetec (16 Feb 2023 07:05)  COVID-19 PCR: NotDetec (13 Feb 2023 07:36)  COVID-19 PCR: NotDetec (06 Feb 2023 09:14)  COVID-19 PCR: NotDetec (03 Feb 2023 12:15)  COVID-19 PCR: NotDetec (01 Feb 2023 11:47)  COVID-19 PCR: NotDetec (23 Jan 2023 13:06)  COVID-19 PCR: NotDetec (18 Jan 2023 21:06)  COVID-19 PCR: NotDetec (16 Jan 2023 02:44)  SARS-CoV-2: NotDetec (12 Jan 2023 18:16)  COVID-19 PCR: NotDetec (10 Sudeep 2023 00:27)        COORDINATION OF CARE:  Consultant Communication and Details of Discussion (where applicable): Discussed with primary team

## 2023-03-30 LAB
GLUCOSE BLDC GLUCOMTR-MCNC: 107 MG/DL — HIGH (ref 70–99)
GLUCOSE BLDC GLUCOMTR-MCNC: 132 MG/DL — HIGH (ref 70–99)
GLUCOSE BLDC GLUCOMTR-MCNC: 139 MG/DL — HIGH (ref 70–99)
GLUCOSE BLDC GLUCOMTR-MCNC: 199 MG/DL — HIGH (ref 70–99)

## 2023-03-30 PROCEDURE — 99232 SBSQ HOSP IP/OBS MODERATE 35: CPT

## 2023-03-30 RX ADMIN — Medication 1 APPLICATION(S): at 05:10

## 2023-03-30 RX ADMIN — Medication 975 MILLIGRAM(S): at 05:08

## 2023-03-30 RX ADMIN — Medication 2: at 11:41

## 2023-03-30 RX ADMIN — DORZOLAMIDE HYDROCHLORIDE TIMOLOL MALEATE 1 DROP(S): 20; 5 SOLUTION/ DROPS OPHTHALMIC at 05:10

## 2023-03-30 RX ADMIN — AMPICILLIN SODIUM AND SULBACTAM SODIUM 200 GRAM(S): 250; 125 INJECTION, POWDER, FOR SUSPENSION INTRAMUSCULAR; INTRAVENOUS at 14:31

## 2023-03-30 RX ADMIN — Medication 975 MILLIGRAM(S): at 12:19

## 2023-03-30 RX ADMIN — Medication 1 APPLICATION(S): at 19:16

## 2023-03-30 RX ADMIN — Medication 81 MILLIGRAM(S): at 12:19

## 2023-03-30 RX ADMIN — INSULIN GLARGINE 4 UNIT(S): 100 INJECTION, SOLUTION SUBCUTANEOUS at 21:56

## 2023-03-30 RX ADMIN — DORZOLAMIDE HYDROCHLORIDE TIMOLOL MALEATE 1 DROP(S): 20; 5 SOLUTION/ DROPS OPHTHALMIC at 19:13

## 2023-03-30 RX ADMIN — Medication 975 MILLIGRAM(S): at 22:56

## 2023-03-30 RX ADMIN — Medication 1 APPLICATION(S): at 05:09

## 2023-03-30 RX ADMIN — Medication 1 APPLICATION(S): at 19:15

## 2023-03-30 RX ADMIN — Medication 975 MILLIGRAM(S): at 06:08

## 2023-03-30 RX ADMIN — Medication 975 MILLIGRAM(S): at 12:49

## 2023-03-30 RX ADMIN — Medication 975 MILLIGRAM(S): at 21:56

## 2023-03-30 NOTE — PROGRESS NOTE ADULT - ASSESSMENT
A/P: 48 yo M sp multiple I&D of Right hand and forearm. Stable now with dry dressing awaiting repeat I&D and Integra placement in 4/4    DVT prophylaxis- ASA daily  Dressing changed daily with telfa  QD shower of forearm  BID aquafor over dry skin  Santyl over eschar  Pain control   Incentive spirometer  Continuing Unasyn til 4/17  OR: 4/4 for repeat I&D and integra placement    Orthopaedic Surgery  [facility] [place] [id number]  [facility]        [id number]  [facility]  [id number]/ [phone]

## 2023-03-30 NOTE — PROGRESS NOTE ADULT - SUBJECTIVE AND OBJECTIVE BOX
[facility] Division of Hospital Medicine  Mckayla Suarez MD EdM  Pager [phone]  Also available on MS Teams    SUBJECTIVE / OVERNIGHT EVENTS:  No events overnight  Feels well this morning. Not having pain, shortness of breath, nausea, or vomiting.    MEDICATIONS  (STANDING):  acetaminophen     Tablet .. 975 milliGRAM(s) Oral every 8 hours  ampicillin/sulbactam  IVPB      ampicillin/sulbactam  IVPB 3 Gram(s) IV Intermittent every 24 hours  AQUAPHOR (petrolatum Ointment) 1 Application(s) Topical two times a day  aspirin  chewable 81 milliGRAM(s) Oral daily  collagenase Ointment 1 Application(s) Topical two times a day  dextrose 5%. 1000 milliLiter(s) (100 mL/Hr) IV Continuous <Continuous>  dextrose 5%. 1000 milliLiter(s) (50 mL/Hr) IV Continuous <Continuous>  dextrose 50% Injectable 25 Gram(s) IV Push once  dextrose 50% Injectable 12.5 Gram(s) IV Push once  dextrose 50% Injectable 25 Gram(s) IV Push once  dorzolamide 2%/timolol 0.5% Ophthalmic Solution 1 Drop(s) Both EYES two times a day  glucagon  Injectable 1 milliGRAM(s) IntraMuscular once  insulin glargine Injectable (LANTUS) 4 Unit(s) SubCutaneous at bedtime  insulin lispro (ADMELOG) corrective regimen sliding scale   SubCutaneous at bedtime  insulin lispro (ADMELOG) corrective regimen sliding scale   SubCutaneous three times a day before meals  senna 2 Tablet(s) Oral at bedtime  Sucroferric Oxyhydroxide 2500mg Tablet 2 Tablet(s) 2 Tablet(s) Oral three times a day    MEDICATIONS  (PRN):  dextrose Oral Gel 15 Gram(s) Oral once PRN Blood Glucose LESS THAN 70 milliGRAM(s)/deciliter  oxyCODONE    IR 5 milliGRAM(s) Oral every 4 hours PRN Moderate Pain (4 - 6)  oxyCODONE    IR 10 milliGRAM(s) Oral every 4 hours PRN Severe Pain (7 - 10)      I&O's Summary    29 Mar 2023 07:01  -  30 Mar 2023 07:00  --------------------------------------------------------  IN: 400 mL / OUT: 2400 mL / NET: -2000 mL        PHYSICAL EXAM:  Vital Signs Last 24 Hrs  T(C): 36.6 (30 Mar 2023 08:45), Max: 36.8 (29 Mar 2023 10:38)  T(F): 97.9 (30 Mar 2023 08:45), Max: 98.2 (29 Mar 2023 10:38)  HR: 61 (30 Mar 2023 08:45) (61 - 71)  BP: 158/67 (30 Mar 2023 08:45) (158/67 - 181/77)  BP(mean): --  RR: 16 (30 Mar 2023 08:45) (16 - 17)  SpO2: 100% (30 Mar 2023 08:45) (96% - 100%)    Parameters below as of 30 Mar 2023 08:45  Patient On (Oxygen Delivery Method): room air      CONSTITUTIONAL: no acute distress, conversant  EYES: conjunctiva and sclera clear  ENMT: Moist oral mucosa  RESPIRATORY: Normal respiratory effort; lungs are clear to auscultation bilaterally  CARDIOVASCULAR: Regular rate and rhythm, normal S1 and S2, no murmur/rub/gallop; No lower extremity edema  ABDOMEN: no tenderness to palpation, normoactive bowel sounds, no rebound/guarding  MSK: R arm ace wrapped, hand wound c/d/i  PSYCH: Alert; affect appropriate  NEUROLOGY: CN 2-12 are intact and symmetric; moving all extremities   SKIN: No rashes on examined skin    LABS:                        7.9    5.44  )-----------( 222      ( 29 Mar 2023 06:45 )             25.5     03-29    136  |  95<L>  |  68<H>  ----------------------------<  108<H>  4.8   |  21<L>  |  8.08<H>    Ca    8.1<L>      29 Mar 2023 06:45                COVID-19 PCR: NotDetec (14 Mar 2023 19:47)  COVID-19 PCR: Detected (06 Mar 2023 18:00)  COVID-19 PCR: NotDetec (23 Feb 2023 11:47)  COVID-19 PCR: NotDetec (16 Feb 2023 07:05)  COVID-19 PCR: NotDetec (13 Feb 2023 07:36)  COVID-19 PCR: NotDetec (06 Feb 2023 09:14)  COVID-19 PCR: NotDetec (03 Feb 2023 12:15)  COVID-19 PCR: NotDetec (01 Feb 2023 11:47)  COVID-19 PCR: NotDetec (23 Jan 2023 13:06)  COVID-19 PCR: NotDetec (18 Jan 2023 21:06)  COVID-19 PCR: NotDetec (16 Jan 2023 02:44)  SARS-CoV-2: NotDetec (12 Jan 2023 18:16)  COVID-19 PCR: NotDetec (10 Sudeep 2023 00:27)      RADIOLOGY & ADDITIONAL TESTS:  New Imaging Personally Reviewed Today:  New Electrocardiogram Personally Reviewed Today:  Prior or Outpatient Records Reviewed Today with Summary:    COORDINATION OF CARE:  Consultant Communication and Details of Discussion (where applicable): Discussed with interdisciplinary team at [facility]    ASSESSMENT FROM INDEPENDENT HISTORIAN:  Discussed with ***

## 2023-03-30 NOTE — PROGRESS NOTE ADULT - PROBLEM SELECTOR PLAN 4
-renal consulted for HD - MWF  -renally dose medications   -anemia of renal disease, c/w epo during HD as per renal  -renal restricted diet w/ nepro supplements    Note- patient has appointment at [facility] for transplant evaluation on 4/20 and requesting discharge by 4/14 to facilitate getting to this appointment

## 2023-03-30 NOTE — PROGRESS NOTE ADULT - PROBLEM SELECTOR PLAN 5
-diabetic retinopathy.  severe proliferative diabetic retinopathy, previously on cosopt, ophtho outpt follow up   -UsX0k=9.5%  -c/w lantus 4 and ss. will titrate up as necessary  -c/w ASA daily  -has indication for statin, will investigate whether there is a reason he is not currently prescribed and if no reason recommend starting atorvastatin 40 mg daily

## 2023-03-30 NOTE — PROGRESS NOTE ADULT - ASSESSMENT
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 Sin/Dr. Singh) who was seen by Dr. Sin today in the office and sent in to [facility] for urgent irrigation and debridement. Patient states he missed dialysis today because he was instructed to come straight to the ED.     PAST MEDICAL & SURGICAL HISTORY:  ESRD on dialysis  H/O hypotension  Diabetes mellitus  S/P BKA (below knee amputation) bilateral  AV fistula (06 Mar 2023 17:32)            esrd on hd , will plan for hd as order    Excess fluids and waste products will be removed from your blood; your electrolytes will be balanced; your blood pressure will be controlled.      ANEMIA PLAN:  Anemia of chronic disease:  We will continue epo aiming for a HCT of 32-36 %.   We will monitor Iron stores, B12 and RBC folate .      send blood and urine cx,serial lactate levels,monitor vitals skyla sanchez hydration,monitor urine output and renal profile,iv abx

## 2023-03-30 NOTE — PROGRESS NOTE ADULT - SUBJECTIVE AND OBJECTIVE BOX
Patient is a 47y Male whom presented to the hospital with esrd on hd    PAST MEDICAL & SURGICAL HISTORY:  ESRD on dialysis      H/O hypotension      Diabetes mellitus      S/P BKA (below knee amputation) bilateral      AV fistula          MEDICATIONS  (STANDING):  acetaminophen     Tablet .. 975 milliGRAM(s) Oral every 8 hours  ampicillin/sulbactam  IVPB      aspirin  chewable 81 milliGRAM(s) Oral daily  dextrose 5%. 1000 milliLiter(s) (50 mL/Hr) IV Continuous <Continuous>  dextrose 5%. 1000 milliLiter(s) (100 mL/Hr) IV Continuous <Continuous>  dextrose 50% Injectable 25 Gram(s) IV Push once  dextrose 50% Injectable 12.5 Gram(s) IV Push once  dextrose 50% Injectable 25 Gram(s) IV Push once  glucagon  Injectable 1 milliGRAM(s) IntraMuscular once  insulin lispro (ADMELOG) corrective regimen sliding scale   SubCutaneous three times a day before meals  lactated ringers. 1000 milliLiter(s) (100 mL/Hr) IV Continuous <Continuous>      Allergies    No Known Drug Allergies  [place] (Rash)    Intolerances        SOCIAL HISTORY:  Denies ETOh,Smoking,     FAMILY HISTORY:  No pertinent family history in first degree relatives        REVIEW OF SYSTEMS:    CONSTITUTIONAL: No weakness, fevers or chills                                                                                    7.9    5.44  )-----------( 222      ( 29 Mar 2023 06:45 )             25.5       CBC Full  -  ( 29 Mar 2023 06:45 )  WBC Count : 5.44 K/uL  RBC Count : 2.97 M/uL  Hemoglobin : 7.9 g/dL  Hematocrit : 25.5 %  Platelet Count - Automated : 222 K/uL  Mean Cell Volume : 85.9 fL  Mean Cell Hemoglobin : 26.6 pg  Mean Cell Hemoglobin Concentration : 31.0 gm/dL  Auto Neutrophil # : x  Auto Lymphocyte # : x  Auto Monocyte # : x  Auto Eosinophil # : x  Auto Basophil # : x  Auto Neutrophil % : x  Auto Lymphocyte % : x  Auto Monocyte % : x  Auto Eosinophil % : x  Auto Basophil % : x      03-29    136  |  95<L>  |  68<H>  ----------------------------<  108<H>  4.8   |  21<L>  |  8.08<H>    Ca    8.1<L>      29 Mar 2023 06:45        CAPILLARY BLOOD GLUCOSE      POCT Blood Glucose.: 107 mg/dL (30 Mar 2023 16:47)  POCT Blood Glucose.: 199 mg/dL (30 Mar 2023 11:34)  POCT Blood Glucose.: 132 mg/dL (30 Mar 2023 07:24)  POCT Blood Glucose.: 141 mg/dL (29 Mar 2023 21:48)      Vital Signs Last 24 Hrs  T(C): 36.4 (30 Mar 2023 17:49), Max: 36.6 (29 Mar 2023 22:08)  T(F): 97.5 (30 Mar 2023 17:49), Max: 97.9 (30 Mar 2023 08:45)  HR: 66 (30 Mar 2023 17:49) (61 - 70)  BP: 180/78 (30 Mar 2023 17:49) (152/70 - 180/78)  BP(mean): --  RR: 18 (30 Mar 2023 17:49) (16 - 18)  SpO2: 100% (30 Mar 2023 17:49) (99% - 100%)    Parameters below as of 30 Mar 2023 17:49  Patient On (Oxygen Delivery Method): room air                              PHYSICAL EXAM:    Constitutional: NAD  HEENT: conjunctive   clear   Neck:  No JVD  Respiratory: CTAB  Cardiovascular: S1 and S2  Gastrointestinal: BS+, soft, NT/ND  Extremities: No peripheral edema ,  Prior right 3rd digit amputation.S/P BKA (below knee amputation) bilateral  Access: pos fistula

## 2023-03-30 NOTE — PROGRESS NOTE ADULT - SUBJECTIVE AND OBJECTIVE BOX
SUBJECTIVE:   Patient seen and examined at bedside. Pt doing generally well.    Pain well controlled     OBJECTIVE:  Vital Signs Last 24 Hrs  T(C): 36.5 (30 Mar 2023 05:14), Max: 36.8 (29 Mar 2023 10:38)  T(F): 97.7 (30 Mar 2023 05:14), Max: 98.2 (29 Mar 2023 10:38)  HR: 70 (30 Mar 2023 05:14) (60 - 71)  BP: 165/69 (30 Mar 2023 05:14) (165/69 - 181/77)  BP(mean): --  RR: 17 (30 Mar 2023 05:14) (16 - 17)  SpO2: 99% (30 Mar 2023 05:14) (96% - 100%)    Parameters below as of 30 Mar 2023 05:14  Patient On (Oxygen Delivery Method): room air        General: NAD  Resp: Non-labored breathing, no accessory muscle use  Left Upper extremity:       Anterior wound volar forearm otherwise skin intact with dry skin       minimal eschar over digits    LABS:                        7.9    5.44  )-----------( 222      ( 29 Mar 2023 06:45 )             25.5     03-29    136  |  95<L>  |  68<H>  ----------------------------<  108<H>  4.8   |  21<L>  |  8.08<H>    Ca    8.1<L>      29 Mar 2023 06:45      I&O's Summary    29 Mar 2023 07:01  -  30 Mar 2023 06:14  --------------------------------------------------------  IN: 400 mL / OUT: 2400 mL / NET: -2000 mL        MEDS:  MEDICATIONS  (STANDING):  acetaminophen     Tablet .. 975 milliGRAM(s) Oral every 8 hours  ampicillin/sulbactam  IVPB 3 Gram(s) IV Intermittent every 24 hours  ampicillin/sulbactam  IVPB      AQUAPHOR (petrolatum Ointment) 1 Application(s) Topical two times a day  aspirin  chewable 81 milliGRAM(s) Oral daily  collagenase Ointment 1 Application(s) Topical two times a day  dextrose 5%. 1000 milliLiter(s) (100 mL/Hr) IV Continuous <Continuous>  dextrose 5%. 1000 milliLiter(s) (50 mL/Hr) IV Continuous <Continuous>  dextrose 50% Injectable 25 Gram(s) IV Push once  dextrose 50% Injectable 12.5 Gram(s) IV Push once  dextrose 50% Injectable 25 Gram(s) IV Push once  dorzolamide 2%/timolol 0.5% Ophthalmic Solution 1 Drop(s) Both EYES two times a day  glucagon  Injectable 1 milliGRAM(s) IntraMuscular once  insulin glargine Injectable (LANTUS) 4 Unit(s) SubCutaneous at bedtime  insulin lispro (ADMELOG) corrective regimen sliding scale   SubCutaneous three times a day before meals  insulin lispro (ADMELOG) corrective regimen sliding scale   SubCutaneous at bedtime  senna 2 Tablet(s) Oral at bedtime  Sucroferric Oxyhydroxide 2500mg Tablet 2 Tablet(s) 2 Tablet(s) Oral three times a day    MEDICATIONS  (PRN):  dextrose Oral Gel 15 Gram(s) Oral once PRN Blood Glucose LESS THAN 70 milliGRAM(s)/deciliter  oxyCODONE    IR 5 milliGRAM(s) Oral every 4 hours PRN Moderate Pain (4 - 6)  oxyCODONE    IR 10 milliGRAM(s) Oral every 4 hours PRN Severe Pain (7 - 10)

## 2023-03-31 LAB
ANION GAP SERPL CALC-SCNC: 21 MMOL/L — HIGH (ref 7–14)
BUN SERPL-MCNC: 60 MG/DL — HIGH (ref 7–23)
CALCIUM SERPL-MCNC: 8.5 MG/DL — SIGNIFICANT CHANGE UP (ref 8.4–10.5)
CHLORIDE SERPL-SCNC: 95 MMOL/L — LOW (ref 98–107)
CO2 SERPL-SCNC: 22 MMOL/L — SIGNIFICANT CHANGE UP (ref 22–31)
CREAT SERPL-MCNC: 8.1 MG/DL — HIGH (ref 0.5–1.3)
EGFR: 8 ML/MIN/1.73M2 — LOW
GLUCOSE BLDC GLUCOMTR-MCNC: 122 MG/DL — HIGH (ref 70–99)
GLUCOSE BLDC GLUCOMTR-MCNC: 128 MG/DL — HIGH (ref 70–99)
GLUCOSE BLDC GLUCOMTR-MCNC: 173 MG/DL — HIGH (ref 70–99)
GLUCOSE BLDC GLUCOMTR-MCNC: 200 MG/DL — HIGH (ref 70–99)
GLUCOSE SERPL-MCNC: 144 MG/DL — HIGH (ref 70–99)
POTASSIUM SERPL-MCNC: 5.1 MMOL/L — SIGNIFICANT CHANGE UP (ref 3.5–5.3)
POTASSIUM SERPL-SCNC: 5.1 MMOL/L — SIGNIFICANT CHANGE UP (ref 3.5–5.3)
SODIUM SERPL-SCNC: 138 MMOL/L — SIGNIFICANT CHANGE UP (ref 135–145)

## 2023-03-31 PROCEDURE — 99232 SBSQ HOSP IP/OBS MODERATE 35: CPT

## 2023-03-31 RX ORDER — AMLODIPINE BESYLATE 2.5 MG/1
10 TABLET ORAL DAILY
Refills: 0 | Status: DISCONTINUED | OUTPATIENT
Start: 2023-03-31 | End: 2023-04-14

## 2023-03-31 RX ORDER — ERYTHROPOIETIN 10000 [IU]/ML
10000 INJECTION, SOLUTION INTRAVENOUS; SUBCUTANEOUS
Refills: 0 | Status: DISCONTINUED | OUTPATIENT
Start: 2023-03-31 | End: 2023-04-14

## 2023-03-31 RX ORDER — ATORVASTATIN CALCIUM 80 MG/1
40 TABLET, FILM COATED ORAL AT BEDTIME
Refills: 0 | Status: DISCONTINUED | OUTPATIENT
Start: 2023-03-31 | End: 2023-04-14

## 2023-03-31 RX ADMIN — Medication 2: at 11:24

## 2023-03-31 RX ADMIN — Medication 1 APPLICATION(S): at 19:54

## 2023-03-31 RX ADMIN — OXYCODONE HYDROCHLORIDE 10 MILLIGRAM(S): 5 TABLET ORAL at 22:15

## 2023-03-31 RX ADMIN — DORZOLAMIDE HYDROCHLORIDE TIMOLOL MALEATE 1 DROP(S): 20; 5 SOLUTION/ DROPS OPHTHALMIC at 19:54

## 2023-03-31 RX ADMIN — Medication 81 MILLIGRAM(S): at 16:32

## 2023-03-31 RX ADMIN — Medication 975 MILLIGRAM(S): at 06:51

## 2023-03-31 RX ADMIN — Medication 975 MILLIGRAM(S): at 05:51

## 2023-03-31 RX ADMIN — Medication 1 APPLICATION(S): at 19:49

## 2023-03-31 RX ADMIN — OXYCODONE HYDROCHLORIDE 10 MILLIGRAM(S): 5 TABLET ORAL at 21:15

## 2023-03-31 RX ADMIN — Medication 1 APPLICATION(S): at 05:52

## 2023-03-31 RX ADMIN — Medication 975 MILLIGRAM(S): at 17:01

## 2023-03-31 RX ADMIN — INSULIN GLARGINE 4 UNIT(S): 100 INJECTION, SOLUTION SUBCUTANEOUS at 21:52

## 2023-03-31 RX ADMIN — AMLODIPINE BESYLATE 10 MILLIGRAM(S): 2.5 TABLET ORAL at 16:31

## 2023-03-31 RX ADMIN — Medication 975 MILLIGRAM(S): at 16:31

## 2023-03-31 RX ADMIN — DORZOLAMIDE HYDROCHLORIDE TIMOLOL MALEATE 1 DROP(S): 20; 5 SOLUTION/ DROPS OPHTHALMIC at 05:52

## 2023-03-31 RX ADMIN — AMPICILLIN SODIUM AND SULBACTAM SODIUM 200 GRAM(S): 250; 125 INJECTION, POWDER, FOR SUSPENSION INTRAMUSCULAR; INTRAVENOUS at 16:33

## 2023-03-31 RX ADMIN — Medication 2.5 MILLIGRAM(S): at 19:49

## 2023-03-31 RX ADMIN — ATORVASTATIN CALCIUM 40 MILLIGRAM(S): 80 TABLET, FILM COATED ORAL at 21:15

## 2023-03-31 NOTE — PROGRESS NOTE ADULT - SUBJECTIVE AND OBJECTIVE BOX
Patient seen and examined at bedside. Reports no acute complaints at this time. Pain is well controlled. No acute events overnight.    ICU Vital Signs Last 24 Hrs  T(C): 36.8 (31 Mar 2023 06:00), Max: 37 (30 Mar 2023 21:49)  T(F): 98.2 (31 Mar 2023 06:00), Max: 98.6 (30 Mar 2023 21:49)  HR: 66 (31 Mar 2023 06:00) (61 - 70)  BP: 146/76 (31 Mar 2023 06:00) (146/76 - 180/78)  BP(mean): --  ABP: --  ABP(mean): --  RR: 17 (31 Mar 2023 06:00) (16 - 18)  SpO2: 99% (31 Mar 2023 06:00) (99% - 100%)    O2 Parameters below as of 31 Mar 2023 06:00  Patient On (Oxygen Delivery Method): room air                              7.9    5.44  )-----------( 222      ( 29 Mar 2023 06:45 )             25.5   03-29    136  |  95<L>  |  68<H>  ----------------------------<  108<H>  4.8   |  21<L>  |  8.08<H>    Ca    8.1<L>      29 Mar 2023 06:45        PHYSICAL EXAM:  General: NAD  Resp: Non-labored breathing, no accessory muscle use  Left Upper extremity:       Anterior wound volar forearm otherwise skin intact with dry skin       minimal eschar over digits        A/P: 46 yo M sp multiple I&D of Right hand and forearm. Stable now with dry dressing awaiting repeat I&D and Integra placement in 4/4    DVT prophylaxis- ASA daily  Dressing changed daily with telfa  QD shower of forearm  BID aquafor over dry skin  Santyl over eschar  Pain control   Incentive spirometer  Continuing Unasyn til 4/17  OR: 4/4 for repeat I&D and integra placement

## 2023-03-31 NOTE — PROGRESS NOTE ADULT - PROBLEM SELECTOR PLAN 3
-likely secondary to post-op changes; pt hemodynamically stable; Hemoglobin has fluctuated throughout hospitalization, currently stable - last checked 3/29.

## 2023-03-31 NOTE — PROGRESS NOTE ADULT - SUBJECTIVE AND OBJECTIVE BOX
Patient is a 47y Male whom presented to the hospital with esrd on hd    PAST MEDICAL & SURGICAL HISTORY:  ESRD on dialysis      H/O hypotension      Diabetes mellitus      S/P BKA (below knee amputation) bilateral      AV fistula          MEDICATIONS  (STANDING):  acetaminophen     Tablet .. 975 milliGRAM(s) Oral every 8 hours  ampicillin/sulbactam  IVPB      aspirin  chewable 81 milliGRAM(s) Oral daily  dextrose 5%. 1000 milliLiter(s) (50 mL/Hr) IV Continuous <Continuous>  dextrose 5%. 1000 milliLiter(s) (100 mL/Hr) IV Continuous <Continuous>  dextrose 50% Injectable 25 Gram(s) IV Push once  dextrose 50% Injectable 12.5 Gram(s) IV Push once  dextrose 50% Injectable 25 Gram(s) IV Push once  glucagon  Injectable 1 milliGRAM(s) IntraMuscular once  insulin lispro (ADMELOG) corrective regimen sliding scale   SubCutaneous three times a day before meals  lactated ringers. 1000 milliLiter(s) (100 mL/Hr) IV Continuous <Continuous>      Allergies    No Known Drug Allergies  [place] (Rash)    Intolerances        SOCIAL HISTORY:  Denies ETOh,Smoking,     FAMILY HISTORY:  No pertinent family history in first degree relatives        REVIEW OF SYSTEMS:    CONSTITUTIONAL: No weakness, fevers or chills                                                        CAPILLARY BLOOD GLUCOSE      POCT Blood Glucose.: 173 mg/dL (31 Mar 2023 11:18)  POCT Blood Glucose.: 128 mg/dL (31 Mar 2023 07:09)  POCT Blood Glucose.: 139 mg/dL (30 Mar 2023 21:37)  POCT Blood Glucose.: 107 mg/dL (30 Mar 2023 16:47)      Vital Signs Last 24 Hrs  T(C): 36 (31 Mar 2023 11:50), Max: 37 (30 Mar 2023 21:49)  T(F): 96.8 (31 Mar 2023 11:50), Max: 98.6 (30 Mar 2023 21:49)  HR: 64 (31 Mar 2023 11:50) (64 - 66)  BP: 186/84 (31 Mar 2023 11:50) (146/76 - 186/84)  BP(mean): --  RR: 16 (31 Mar 2023 11:50) (16 - 18)  SpO2: 100% (31 Mar 2023 10:01) (99% - 100%)    Parameters below as of 31 Mar 2023 11:50  Patient On (Oxygen Delivery Method): room air                                                        7.9    5.44  )-----------( 222      ( 29 Mar 2023 06:45 )             25.5       CBC Full  -  ( 29 Mar 2023 06:45 )  WBC Count : 5.44 K/uL  RBC Count : 2.97 M/uL  Hemoglobin : 7.9 g/dL  Hematocrit : 25.5 %  Platelet Count - Automated : 222 K/uL  Mean Cell Volume : 85.9 fL  Mean Cell Hemoglobin : 26.6 pg  Mean Cell Hemoglobin Concentration : 31.0 gm/dL  Auto Neutrophil # : x  Auto Lymphocyte # : x  Auto Monocyte # : x  Auto Eosinophil # : x  Auto Basophil # : x  Auto Neutrophil % : x  Auto Lymphocyte % : x  Auto Monocyte % : x  Auto Eosinophil % : x  Auto Basophil % : x      03-29    136  |  95<L>  |  68<H>  ----------------------------<  108<H>  4.8   |  21<L>  |  8.08<H>    Ca    8.1<L>      29 Mar 2023 06:45        CAPILLARY BLOOD GLUCOSE      POCT Blood Glucose.: 107 mg/dL (30 Mar 2023 16:47)  POCT Blood Glucose.: 199 mg/dL (30 Mar 2023 11:34)  POCT Blood Glucose.: 132 mg/dL (30 Mar 2023 07:24)  POCT Blood Glucose.: 141 mg/dL (29 Mar 2023 21:48)      Vital Signs Last 24 Hrs  T(C): 36.4 (30 Mar 2023 17:49), Max: 36.6 (29 Mar 2023 22:08)  T(F): 97.5 (30 Mar 2023 17:49), Max: 97.9 (30 Mar 2023 08:45)  HR: 66 (30 Mar 2023 17:49) (61 - 70)  BP: 180/78 (30 Mar 2023 17:49) (152/70 - 180/78)  BP(mean): --  RR: 18 (30 Mar 2023 17:49) (16 - 18)  SpO2: 100% (30 Mar 2023 17:49) (99% - 100%)    Parameters below as of 30 Mar 2023 17:49  Patient On (Oxygen Delivery Method): room air                              PHYSICAL EXAM:    Constitutional: NAD  HEENT: conjunctive   clear   Neck:  No JVD  Respiratory: CTAB  Cardiovascular: S1 and S2  Gastrointestinal: BS+, soft, NT/ND  Extremities: No peripheral edema ,  Prior right 3rd digit amputation.S/P BKA (below knee amputation) bilateral  Access: pos fistula

## 2023-03-31 NOTE — PROGRESS NOTE ADULT - ASSESSMENT
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

## 2023-03-31 NOTE — PROGRESS NOTE ADULT - ASSESSMENT
This is a 46yo Cameroonian speaking male with PMH of DM2, b/l BKA, ESRD (on HD MWF) well known to Orthopedics for treatment of right third finger 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 Sin/Dr. Singh) who was seen by Dr. Sin today in the office and sent in to [facility] for urgent irrigation and debridement. Patient states he missed dialysis today because he was instructed to come straight to the ED.     PAST MEDICAL & SURGICAL HISTORY:  ESRD on dialysis  H/O hypotension  Diabetes mellitus  S/P BKA (below knee amputation) bilateral  AV fistula (06 Mar 2023 17:32)      BP monitoring,continue current antihypertensive meds, low salt diet,followup with PMD in 1-2 weeks        esrd on hd , will plan for hd as order    Excess fluids and waste products will be removed from your blood; your electrolytes will be balanced; your blood pressure will be controlled.      ANEMIA PLAN:  Anemia of chronic disease:  We will continue epo aiming for a HCT of 32-36 %.   We will monitor Iron stores, B12 and RBC folate .      send blood and urine cx,serial lactate levels,monitor vitals closley,ivfs hydration,monitor urine output and renal profile,iv abx

## 2023-03-31 NOTE — PROGRESS NOTE ADULT - SUBJECTIVE AND OBJECTIVE BOX
[facility] Division of Hospital Medicine  Mckayla Suarez MD EdM  Pager [phone]  Also available on MS Teams    SUBJECTIVE / OVERNIGHT EVENTS:  No events overnight. Pain is well controlled this morning.    MEDICATIONS  (STANDING):  acetaminophen     Tablet .. 975 milliGRAM(s) Oral every 8 hours  ampicillin/sulbactam  IVPB      ampicillin/sulbactam  IVPB 3 Gram(s) IV Intermittent every 24 hours  AQUAPHOR (petrolatum Ointment) 1 Application(s) Topical two times a day  aspirin  chewable 81 milliGRAM(s) Oral daily  atorvastatin 40 milliGRAM(s) Oral at bedtime  collagenase Ointment 1 Application(s) Topical two times a day  dextrose 5%. 1000 milliLiter(s) (100 mL/Hr) IV Continuous <Continuous>  dextrose 5%. 1000 milliLiter(s) (50 mL/Hr) IV Continuous <Continuous>  dextrose 50% Injectable 25 Gram(s) IV Push once  dextrose 50% Injectable 12.5 Gram(s) IV Push once  dextrose 50% Injectable 25 Gram(s) IV Push once  dorzolamide 2%/timolol 0.5% Ophthalmic Solution 1 Drop(s) Both EYES two times a day  glucagon  Injectable 1 milliGRAM(s) IntraMuscular once  insulin glargine Injectable (LANTUS) 4 Unit(s) SubCutaneous at bedtime  insulin lispro (ADMELOG) corrective regimen sliding scale   SubCutaneous at bedtime  insulin lispro (ADMELOG) corrective regimen sliding scale   SubCutaneous three times a day before meals  senna 2 Tablet(s) Oral at bedtime  Sucroferric Oxyhydroxide 2500mg Tablet 2 Tablet(s) 2 Tablet(s) Oral three times a day    MEDICATIONS  (PRN):  dextrose Oral Gel 15 Gram(s) Oral once PRN Blood Glucose LESS THAN 70 milliGRAM(s)/deciliter  oxyCODONE    IR 5 milliGRAM(s) Oral every 4 hours PRN Moderate Pain (4 - 6)  oxyCODONE    IR 10 milliGRAM(s) Oral every 4 hours PRN Severe Pain (7 - 10)        PHYSICAL EXAM:  Vital Signs Last 24 Hrs  T(C): 36.9 (31 Mar 2023 10:01), Max: 37 (30 Mar 2023 21:49)  T(F): 98.4 (31 Mar 2023 10:01), Max: 98.6 (30 Mar 2023 21:49)  HR: 66 (31 Mar 2023 10:01) (64 - 70)  BP: 177/58 (31 Mar 2023 10:01) (146/76 - 180/78)  BP(mean): --  RR: 18 (31 Mar 2023 10:01) (17 - 18)  SpO2: 100% (31 Mar 2023 10:01) (99% - 100%)    Parameters below as of 31 Mar 2023 10:01  Patient On (Oxygen Delivery Method): room air      CONSTITUTIONAL: no acute distress, conversant  EYES: conjunctiva and sclera clear  ENMT: Moist oral mucosa  RESPIRATORY: Normal respiratory effort; lungs are clear to auscultation bilaterally  CARDIOVASCULAR: Regular rate and rhythm, normal S1 and S2, no murmur/rub/gallop; No lower extremity edema  ABDOMEN: no tenderness to palpation, normoactive bowel sounds, no rebound/guarding  MSK: R arm ace wrapped, hand wound c/d/i  PSYCH: Alert; affect appropriate  NEUROLOGY: CN 2-12 are intact and symmetric; moving all extremities   SKIN: No rashes on examined skin    LABS;      COVID-19 PCR: NotDetec (14 Mar 2023 19:47)  COVID-19 PCR: Detected (06 Mar 2023 18:00)  COVID-19 PCR: NotDetec (23 Feb 2023 11:47)  COVID-19 PCR: NotDetec (16 Feb 2023 07:05)  COVID-19 PCR: NotDetec (13 Feb 2023 07:36)  COVID-19 PCR: NotDetec (06 Feb 2023 09:14)  COVID-19 PCR: NotDetec (03 Feb 2023 12:15)  COVID-19 PCR: NotDetec (01 Feb 2023 11:47)  COVID-19 PCR: NotDetec (23 Jan 2023 13:06)  COVID-19 PCR: NotDetec (18 Jan 2023 21:06)  COVID-19 PCR: NotDetec (16 Jan 2023 02:44)  SARS-CoV-2: NotDetec (12 Jan 2023 18:16)  COVID-19 PCR: NotDetec (10 Sudeep 2023 00:27)    COORDINATION OF CARE:  Consultant Communication and Details of Discussion (where applicable): Discussed with primary team

## 2023-03-31 NOTE — PROGRESS NOTE ADULT - PROBLEM SELECTOR PLAN 4
-renal consulted for HD - MWF  -renally dose medications   -anemia of renal disease, c/w epo during HD as per renal  -renal restricted diet w/ nepro supplements    Note- patient has appointment at [place] for renal transplant evaluation on 4/20 and requesting discharge by 4/14 to facilitate getting to this appointment - ortho team aware

## 2023-03-31 NOTE — PROGRESS NOTE ADULT - PROBLEM SELECTOR PLAN 5
-diabetic retinopathy.  severe proliferative diabetic retinopathy, previously on cosopt, ophtho outpt follow up   -CjD8f=6.5%  -c/w lantus 4 and ss  -c/w ASA daily  -discussed statin with patient on 3/30 - he thinks he was previously taking atorvastatin, not sure whether it was stopped or lost to follow-up. We discussed risks and benefits of starting statin and he agrees to start atorvastatin 40 mg daily.  - Atorvastatin 40 mg daily (started 3/31)

## 2023-04-01 DIAGNOSIS — R03.0 ELEVATED BLOOD-PRESSURE READING, WITHOUT DIAGNOSIS OF HYPERTENSION: ICD-10-CM

## 2023-04-01 LAB
GLUCOSE BLDC GLUCOMTR-MCNC: 148 MG/DL — HIGH (ref 70–99)
GLUCOSE BLDC GLUCOMTR-MCNC: 161 MG/DL — HIGH (ref 70–99)
GLUCOSE BLDC GLUCOMTR-MCNC: 167 MG/DL — HIGH (ref 70–99)
GLUCOSE BLDC GLUCOMTR-MCNC: 182 MG/DL — HIGH (ref 70–99)

## 2023-04-01 PROCEDURE — 99233 SBSQ HOSP IP/OBS HIGH 50: CPT

## 2023-04-01 RX ORDER — OXYCODONE HYDROCHLORIDE 5 MG/1
5 TABLET ORAL EVERY 4 HOURS
Refills: 0 | Status: DISCONTINUED | OUTPATIENT
Start: 2023-04-01 | End: 2023-04-08

## 2023-04-01 RX ORDER — OXYCODONE HYDROCHLORIDE 5 MG/1
10 TABLET ORAL EVERY 4 HOURS
Refills: 0 | Status: DISCONTINUED | OUTPATIENT
Start: 2023-04-01 | End: 2023-04-08

## 2023-04-01 RX ADMIN — DORZOLAMIDE HYDROCHLORIDE TIMOLOL MALEATE 1 DROP(S): 20; 5 SOLUTION/ DROPS OPHTHALMIC at 20:29

## 2023-04-01 RX ADMIN — Medication 2: at 12:06

## 2023-04-01 RX ADMIN — Medication 975 MILLIGRAM(S): at 23:41

## 2023-04-01 RX ADMIN — OXYCODONE HYDROCHLORIDE 10 MILLIGRAM(S): 5 TABLET ORAL at 22:44

## 2023-04-01 RX ADMIN — ATORVASTATIN CALCIUM 40 MILLIGRAM(S): 80 TABLET, FILM COATED ORAL at 21:44

## 2023-04-01 RX ADMIN — Medication 81 MILLIGRAM(S): at 12:06

## 2023-04-01 RX ADMIN — Medication 2: at 07:33

## 2023-04-01 RX ADMIN — DORZOLAMIDE HYDROCHLORIDE TIMOLOL MALEATE 1 DROP(S): 20; 5 SOLUTION/ DROPS OPHTHALMIC at 05:51

## 2023-04-01 RX ADMIN — AMPICILLIN SODIUM AND SULBACTAM SODIUM 200 GRAM(S): 250; 125 INJECTION, POWDER, FOR SUSPENSION INTRAMUSCULAR; INTRAVENOUS at 16:47

## 2023-04-01 RX ADMIN — OXYCODONE HYDROCHLORIDE 10 MILLIGRAM(S): 5 TABLET ORAL at 21:44

## 2023-04-01 RX ADMIN — INSULIN GLARGINE 4 UNIT(S): 100 INJECTION, SOLUTION SUBCUTANEOUS at 21:45

## 2023-04-01 RX ADMIN — Medication 1 APPLICATION(S): at 20:30

## 2023-04-01 RX ADMIN — AMLODIPINE BESYLATE 10 MILLIGRAM(S): 2.5 TABLET ORAL at 05:53

## 2023-04-01 RX ADMIN — Medication 1 APPLICATION(S): at 05:52

## 2023-04-01 NOTE — PROGRESS NOTE ADULT - SUBJECTIVE AND OBJECTIVE BOX
Patient is a 47y old  Male who presents with a chief complaint of Right forearm gas gangrene (01 Apr 2023 00:45)      SUBJECTIVE / OVERNIGHT EVENTS:    MEDICATIONS  (STANDING):  acetaminophen     Tablet .. 975 milliGRAM(s) Oral every 8 hours  amLODIPine   Tablet 10 milliGRAM(s) Oral daily  ampicillin/sulbactam  IVPB      ampicillin/sulbactam  IVPB 3 Gram(s) IV Intermittent every 24 hours  AQUAPHOR (petrolatum Ointment) 1 Application(s) Topical two times a day  aspirin  chewable 81 milliGRAM(s) Oral daily  atorvastatin 40 milliGRAM(s) Oral at bedtime  collagenase Ointment 1 Application(s) Topical two times a day  dextrose 5%. 1000 milliLiter(s) (50 mL/Hr) IV Continuous <Continuous>  dextrose 5%. 1000 milliLiter(s) (100 mL/Hr) IV Continuous <Continuous>  dextrose 50% Injectable 25 Gram(s) IV Push once  dextrose 50% Injectable 12.5 Gram(s) IV Push once  dextrose 50% Injectable 25 Gram(s) IV Push once  dorzolamide 2%/timolol 0.5% Ophthalmic Solution 1 Drop(s) Both EYES two times a day  epoetin deidre-epbx (RETACRIT) Injectable 15267 Unit(s) IV Push <User Schedule>  glucagon  Injectable 1 milliGRAM(s) IntraMuscular once  insulin glargine Injectable (LANTUS) 4 Unit(s) SubCutaneous at bedtime  insulin lispro (ADMELOG) corrective regimen sliding scale   SubCutaneous at bedtime  insulin lispro (ADMELOG) corrective regimen sliding scale   SubCutaneous three times a day before meals  senna 2 Tablet(s) Oral at bedtime  Sucroferric Oxyhydroxide 2500mg Tablet 2 Tablet(s) 2 Tablet(s) Oral three times a day    MEDICATIONS  (PRN):  dextrose Oral Gel 15 Gram(s) Oral once PRN Blood Glucose LESS THAN 70 milliGRAM(s)/deciliter      Vital Signs Last 24 Hrs  T(C): 37.1 (01 Apr 2023 09:27), Max: 37.1 (01 Apr 2023 09:27)  T(F): 98.8 (01 Apr 2023 09:27), Max: 98.8 (01 Apr 2023 09:27)  HR: 61 (01 Apr 2023 09:27) (61 - 74)  BP: 141/74 (01 Apr 2023 09:27) (141/74 - 186/84)  BP(mean): --  RR: 17 (01 Apr 2023 09:27) (16 - 18)  SpO2: 97% (01 Apr 2023 09:27) (94% - 98%)    Parameters below as of 01 Apr 2023 09:27  Patient On (Oxygen Delivery Method): room air      CAPILLARY BLOOD GLUCOSE      POCT Blood Glucose.: 161 mg/dL (01 Apr 2023 07:10)  POCT Blood Glucose.: 200 mg/dL (31 Mar 2023 21:50)  POCT Blood Glucose.: 122 mg/dL (31 Mar 2023 16:35)    I&O's Summary    31 Mar 2023 07:01  -  01 Apr 2023 07:00  --------------------------------------------------------  IN: 400 mL / OUT: 2400 mL / NET: -2000 mL    01 Apr 2023 07:01  -  01 Apr 2023 11:27  --------------------------------------------------------  IN: 150 mL / OUT: 0 mL / NET: 150 mL        CONSTITUTIONAL: no acute distress, conversant  EYES: conjunctiva and sclera clear  ENMT: Moist oral mucosa  RESPIRATORY: Normal respiratory effort; lungs are clear to auscultation bilaterally  CARDIOVASCULAR: Regular rate and rhythm, normal S1 and S2, no murmur/rub/gallop; No lower extremity edema  ABDOMEN: no tenderness to palpation, normoactive bowel sounds, no rebound/guarding  MSK: R arm ace wrapped, hand wound c/d/i  PSYCH: Alert; affect appropriate  NEUROLOGY: CN 2-12 are intact and symmetric; moving all extremities   SKIN: No rashes on examined skin  LABS:    03-31    138  |  95<L>  |  60<H>  ----------------------------<  144<H>  5.1   |  22  |  8.10<H>    Ca    8.5      31 Mar 2023 12:00                RADIOLOGY & ADDITIONAL TESTS:    Imaging Personally Reviewed:    Consultant(s) Notes Reviewed:      Care Discussed with Consultants/Other Providers:   Patient is a 47y old  Male who presents with a chief complaint of Right forearm gas gangrene (01 Apr 2023 00:45)      SUBJECTIVE / OVERNIGHT EVENTS:  Patient concerned about higher than normal BPs since yesterday. Denies cp, SOB, abdominal pain, N/V/D     MEDICATIONS  (STANDING):  acetaminophen     Tablet .. 975 milliGRAM(s) Oral every 8 hours  amLODIPine   Tablet 10 milliGRAM(s) Oral daily  ampicillin/sulbactam  IVPB      ampicillin/sulbactam  IVPB 3 Gram(s) IV Intermittent every 24 hours  AQUAPHOR (petrolatum Ointment) 1 Application(s) Topical two times a day  aspirin  chewable 81 milliGRAM(s) Oral daily  atorvastatin 40 milliGRAM(s) Oral at bedtime  collagenase Ointment 1 Application(s) Topical two times a day  dextrose 5%. 1000 milliLiter(s) (50 mL/Hr) IV Continuous <Continuous>  dextrose 5%. 1000 milliLiter(s) (100 mL/Hr) IV Continuous <Continuous>  dextrose 50% Injectable 25 Gram(s) IV Push once  dextrose 50% Injectable 12.5 Gram(s) IV Push once  dextrose 50% Injectable 25 Gram(s) IV Push once  dorzolamide 2%/timolol 0.5% Ophthalmic Solution 1 Drop(s) Both EYES two times a day  epoetin deidre-epbx (RETACRIT) Injectable 91966 Unit(s) IV Push <User Schedule>  glucagon  Injectable 1 milliGRAM(s) IntraMuscular once  insulin glargine Injectable (LANTUS) 4 Unit(s) SubCutaneous at bedtime  insulin lispro (ADMELOG) corrective regimen sliding scale   SubCutaneous at bedtime  insulin lispro (ADMELOG) corrective regimen sliding scale   SubCutaneous three times a day before meals  senna 2 Tablet(s) Oral at bedtime  Sucroferric Oxyhydroxide 2500mg Tablet 2 Tablet(s) 2 Tablet(s) Oral three times a day    MEDICATIONS  (PRN):  dextrose Oral Gel 15 Gram(s) Oral once PRN Blood Glucose LESS THAN 70 milliGRAM(s)/deciliter      Vital Signs Last 24 Hrs  T(C): 37.1 (01 Apr 2023 09:27), Max: 37.1 (01 Apr 2023 09:27)  T(F): 98.8 (01 Apr 2023 09:27), Max: 98.8 (01 Apr 2023 09:27)  HR: 61 (01 Apr 2023 09:27) (61 - 74)  BP: 141/74 (01 Apr 2023 09:27) (141/74 - 186/84)  BP(mean): --  RR: 17 (01 Apr 2023 09:27) (16 - 18)  SpO2: 97% (01 Apr 2023 09:27) (94% - 98%)    Parameters below as of 01 Apr 2023 09:27  Patient On (Oxygen Delivery Method): room air      CAPILLARY BLOOD GLUCOSE      POCT Blood Glucose.: 161 mg/dL (01 Apr 2023 07:10)  POCT Blood Glucose.: 200 mg/dL (31 Mar 2023 21:50)  POCT Blood Glucose.: 122 mg/dL (31 Mar 2023 16:35)    I&O's Summary    31 Mar 2023 07:01  -  01 Apr 2023 07:00  --------------------------------------------------------  IN: 400 mL / OUT: 2400 mL / NET: -2000 mL    01 Apr 2023 07:01  -  01 Apr 2023 11:27  --------------------------------------------------------  IN: 150 mL / OUT: 0 mL / NET: 150 mL        CONSTITUTIONAL: no acute distress, conversant  EYES: conjunctiva and sclera clear  ENMT: Moist oral mucosa  RESPIRATORY: Normal respiratory effort; lungs are clear to auscultation bilaterally  CARDIOVASCULAR: Regular rate and rhythm, normal S1 and S2, no murmur/rub/gallop; No lower extremity edema  ABDOMEN: no tenderness to palpation, normoactive bowel sounds, no rebound/guarding  MSK: R arm ace wrapped, hand wound c/d/i  PSYCH: Alert; affect appropriate  NEUROLOGY: CN 2-12 are intact and symmetric; moving all extremities   SKIN: No rashes on examined skin  LABS:    03-31    138  |  95<L>  |  60<H>  ----------------------------<  144<H>  5.1   |  22  |  8.10<H>    Ca    8.5      31 Mar 2023 12:00                RADIOLOGY & ADDITIONAL TESTS:    Imaging Personally Reviewed:    Consultant(s) Notes Reviewed:      Care Discussed with Consultants/Other Providers:

## 2023-04-01 NOTE — PROGRESS NOTE ADULT - PROBLEM SELECTOR PLAN 5
-diabetic retinopathy.  severe proliferative diabetic retinopathy, previously on cosopt, ophtho outpt follow up   -MfZ5o=1.5%  -c/w lantus 4 and ss  -c/w ASA daily  -discussed statin with patient on 3/30 - he thinks he was previously taking atorvastatin, not sure whether it was stopped or lost to follow-up. We discussed risks and benefits of starting statin and he agrees to start atorvastatin 40 mg daily.  - Atorvastatin 40 mg daily (started 3/31) -renal consulted for HD - MWF  -renally dose medications   -anemia of renal disease, c/w epo during HD as per renal  -renal restricted diet w/ nepro supplements    Note- patient has appointment at [place] for renal transplant evaluation on 4/20 and requesting discharge by 4/14 to facilitate getting to this appointment - ortho team aware

## 2023-04-01 NOTE — PROGRESS NOTE ADULT - ASSESSMENT
This is a 46yo Cymro speaking male with PMH of DM2, b/l BKA, ESRD (on HD MWF) well known to Orthopedics for treatment of right third finger 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 Sin/Dr. Singh) who was seen by Dr. Sin today in the office and sent in to [facility] for urgent irrigation and debridement. Patient states he missed dialysis today because he was instructed to come straight to the ED.     PAST MEDICAL & SURGICAL HISTORY:  ESRD on dialysis  H/O hypotension  Diabetes mellitus  S/P BKA (below knee amputation) bilateral  AV fistula (06 Mar 2023 17:32)      BP monitoring,continue current antihypertensive meds, low salt diet,followup with PMD in 1-2 weeks        esrd on hd , will plan for hd as order    Excess fluids and waste products will be removed from your blood; your electrolytes will be balanced; your blood pressure will be controlled.      ANEMIA PLAN:  Anemia of chronic disease:  We will continue epo aiming for a HCT of 32-36 %.   We will monitor Iron stores, B12 and RBC folate .      send blood and urine cx,serial lactate levels,monitor vitals closley,ivfs hydration,monitor urine output and renal profile,iv abx

## 2023-04-01 NOTE — PROGRESS NOTE ADULT - PROBLEM SELECTOR PLAN 3
-likely secondary to post-op changes; pt hemodynamically stable; Hemoglobin has fluctuated throughout hospitalization, currently stable - last checked 3/29. Resolved  -covid vaccinated x2  -asymptomatic and CXR clear  -completed 3 day course of remdesivir on 3/9/23  -now off isolation

## 2023-04-01 NOTE — PROGRESS NOTE ADULT - PROBLEM SELECTOR PLAN 4
-renal consulted for HD - MWF  -renally dose medications   -anemia of renal disease, c/w epo during HD as per renal  -renal restricted diet w/ nepro supplements    Note- patient has appointment at [place] for renal transplant evaluation on 4/20 and requesting discharge by 4/14 to facilitate getting to this appointment - ortho team aware -likely secondary to post-op changes; pt hemodynamically stable; Hemoglobin has fluctuated throughout hospitalization, currently stable - last checked 3/29.

## 2023-04-01 NOTE — PROGRESS NOTE ADULT - PROBLEM SELECTOR PLAN 6
- DVT ppx: ASA daily per ortho -diabetic retinopathy.  severe proliferative diabetic retinopathy, previously on cosopt, ophtho outpt follow up   -EmK5b=0.5%  -c/w lantus 4 and ss  -c/w ASA daily  -discussed statin with patient on 3/30 - he thinks he was previously taking atorvastatin, not sure whether it was stopped or lost to follow-up. We discussed risks and benefits of starting statin and he agrees to start atorvastatin 40 mg daily.  - Atorvastatin 40 mg daily (started 3/31)

## 2023-04-01 NOTE — PROGRESS NOTE ADULT - SUBJECTIVE AND OBJECTIVE BOX
Patient seen and examined at bedside. BP elevated, began enalapril. Pain is well controlled.    Vital Signs Last 24 Hrs  T(C): 36.4 (31 Mar 2023 21:24), Max: 36.9 (31 Mar 2023 10:01)  T(F): 97.6 (31 Mar 2023 21:24), Max: 98.4 (31 Mar 2023 10:01)  HR: 69 (31 Mar 2023 21:24) (64 - 74)  BP: 168/86 (31 Mar 2023 21:34) (146/76 - 186/84)  RR: 17 (31 Mar 2023 21:24) (16 - 18)  SpO2: 94% (31 Mar 2023 21:24) (94% - 100%)  Parameters below as of 31 Mar 2023 21:24  Patient On (Oxygen Delivery Method): room air    LABS             7.9    5.44  )-----------( 222      ( 29 Mar 2023 06:45 )             25.5   03-29    136  |  95<L>  |  68<H>  ----------------------------<  108<H>  4.8   |  21<L>  |  8.08<H>    Ca    8.1<L>      29 Mar 2023 06:45        PHYSICAL EXAM:  General: NAD  Resp: Non-labored breathing, no accessory muscle use  Left Upper extremity:       Anterior wound volar forearm otherwise skin intact with dry skin       minimal eschar over digits        A/P: 48 yo M sp multiple I&D of Right hand and forearm. Stable now with dry dressing awaiting repeat I&D and Integra placement in 4/4    DVT prophylaxis- ASA daily  Dressing changed daily with telfa  QD shower of forearm  BID Aquaphor over dry skin  Santyl over eschar  Pain control   Incentive spirometer  Continuing Unasyn til 4/17  OR: 4/4 for repeat I&D and integra placement  Monitor BP Patient seen and examined at bedside. BP elevated --> enalapril. Pain is well controlled.    Vital Signs Last 24 Hrs  T(C): 36.4 (31 Mar 2023 21:24), Max: 36.9 (31 Mar 2023 10:01)  T(F): 97.6 (31 Mar 2023 21:24), Max: 98.4 (31 Mar 2023 10:01)  HR: 69 (31 Mar 2023 21:24) (64 - 74)  BP: 168/86 (31 Mar 2023 21:34) (146/76 - 186/84)  RR: 17 (31 Mar 2023 21:24) (16 - 18)  SpO2: 94% (31 Mar 2023 21:24) (94% - 100%)  Parameters below as of 31 Mar 2023 21:24  Patient On (Oxygen Delivery Method): room air    LABS             7.9    5.44  )-----------( 222      ( 29 Mar 2023 06:45 )             25.5   03-29    136  |  95<L>  |  68<H>  ----------------------------<  108<H>  4.8   |  21<L>  |  8.08<H>    Ca    8.1<L>      29 Mar 2023 06:45        PHYSICAL EXAM:  General: NAD  Resp: Non-labored breathing, no accessory muscle use  Left Upper extremity:       Anterior wound volar forearm otherwise skin intact with dry skin       minimal eschar over digits        A/P: 48 yo M sp multiple I&D of Right hand and forearm. Stable now with dry dressing awaiting repeat I&D and Integra placement in 4/4    DVT prophylaxis- ASA daily  Dressing changed daily with telfa  QD shower of forearm  BID Aquaphor over dry skin  Santyl over eschar  Pain control   Incentive spirometer  Continuing Unasyn til 4/17  OR: 4/4 for repeat I&D and integra placement  Monitor BP

## 2023-04-01 NOTE — PROGRESS NOTE ADULT - ASSESSMENT
47 y.o. 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

## 2023-04-01 NOTE — PROGRESS NOTE ADULT - PROBLEM SELECTOR PLAN 2
Resolved  -covid vaccinated x2  -asymptomatic and CXR clear  -completed 3 day course of remdesivir on 3/9/23  -now off isolation Patient says he normal has low BPs  Since 3/31 his BPs have been elevated  Norvasc 10mg qdaily started yesterday with improved BPs  Patient noticed that less fluid has been removed from inpatient HD sessions compared to outpatient. He says 2 Liters is usually removed compared to 1.5 liters at [facility]  Will notify nephrology.

## 2023-04-02 DIAGNOSIS — Z01.818 ENCOUNTER FOR OTHER PREPROCEDURAL EXAMINATION: ICD-10-CM

## 2023-04-02 LAB
GLUCOSE BLDC GLUCOMTR-MCNC: 128 MG/DL — HIGH (ref 70–99)
GLUCOSE BLDC GLUCOMTR-MCNC: 137 MG/DL — HIGH (ref 70–99)
GLUCOSE BLDC GLUCOMTR-MCNC: 150 MG/DL — HIGH (ref 70–99)
GLUCOSE BLDC GLUCOMTR-MCNC: 219 MG/DL — HIGH (ref 70–99)

## 2023-04-02 PROCEDURE — 99232 SBSQ HOSP IP/OBS MODERATE 35: CPT

## 2023-04-02 RX ADMIN — Medication 4: at 17:00

## 2023-04-02 RX ADMIN — Medication 1 APPLICATION(S): at 05:31

## 2023-04-02 RX ADMIN — Medication 975 MILLIGRAM(S): at 00:41

## 2023-04-02 RX ADMIN — AMLODIPINE BESYLATE 10 MILLIGRAM(S): 2.5 TABLET ORAL at 05:31

## 2023-04-02 RX ADMIN — Medication 975 MILLIGRAM(S): at 10:12

## 2023-04-02 RX ADMIN — DORZOLAMIDE HYDROCHLORIDE TIMOLOL MALEATE 1 DROP(S): 20; 5 SOLUTION/ DROPS OPHTHALMIC at 05:31

## 2023-04-02 RX ADMIN — Medication 81 MILLIGRAM(S): at 12:44

## 2023-04-02 RX ADMIN — Medication 975 MILLIGRAM(S): at 09:12

## 2023-04-02 RX ADMIN — ATORVASTATIN CALCIUM 40 MILLIGRAM(S): 80 TABLET, FILM COATED ORAL at 22:20

## 2023-04-02 RX ADMIN — INSULIN GLARGINE 4 UNIT(S): 100 INJECTION, SOLUTION SUBCUTANEOUS at 22:20

## 2023-04-02 RX ADMIN — Medication 975 MILLIGRAM(S): at 18:02

## 2023-04-02 RX ADMIN — Medication 1 APPLICATION(S): at 17:03

## 2023-04-02 RX ADMIN — AMPICILLIN SODIUM AND SULBACTAM SODIUM 200 GRAM(S): 250; 125 INJECTION, POWDER, FOR SUSPENSION INTRAMUSCULAR; INTRAVENOUS at 17:01

## 2023-04-02 RX ADMIN — DORZOLAMIDE HYDROCHLORIDE TIMOLOL MALEATE 1 DROP(S): 20; 5 SOLUTION/ DROPS OPHTHALMIC at 17:02

## 2023-04-02 RX ADMIN — Medication 975 MILLIGRAM(S): at 17:02

## 2023-04-02 NOTE — PROGRESS NOTE ADULT - SUBJECTIVE AND OBJECTIVE BOX
Overnight events: None    SUBJECTIVE: Pt seen and examined at bedside. Pt denies any acute complaints, pain is well controlled      OBJECTIVE:  Vital Signs Last 24 Hrs  T(C): 36.6 (02 Apr 2023 05:40), Max: 37.2 (01 Apr 2023 13:10)  T(F): 97.8 (02 Apr 2023 05:40), Max: 99 (01 Apr 2023 13:10)  HR: 61 (02 Apr 2023 05:40) (61 - 80)  BP: 141/66 (02 Apr 2023 05:40) (141/66 - 161/69)  BP(mean): --  RR: 18 (02 Apr 2023 05:40) (16 - 18)  SpO2: 100% (02 Apr 2023 05:40) (95% - 100%)    Parameters below as of 02 Apr 2023 05:40  Patient On (Oxygen Delivery Method): room air          03-31-23 @ 07:01  -  04-01-23 @ 07:00  --------------------------------------------------------  IN: 400 mL / OUT: 2400 mL / NET: -2000 mL    04-01-23 @ 07:01  -  04-02-23 @ 06:11  --------------------------------------------------------  IN: 150 mL / OUT: 0 mL / NET: 150 mL        Physical Examination:  General: NAD  Resp: Non-labored breathing, no accessory muscle use  Left Upper extremity:       Anterior wound volar forearm otherwise skin intact with dry skin       minimal eschar over digits       P      LABS:      03-31    138  |  95<L>  |  60<H>  ----------------------------<  144<H>  5.1   |  22  |  8.10<H>    Ca    8.5      31 Mar 2023 12:00

## 2023-04-02 NOTE — PROGRESS NOTE ADULT - PROBLEM SELECTOR PLAN 6
-renal consulted for HD - MWF  -renally dose medications   -anemia of renal disease, c/w epo during HD as per renal  -renal restricted diet w/ nepro supplements    Note- patient has appointment at [place] for renal transplant evaluation on 4/20 and requesting discharge by 4/14 to facilitate getting to this appointment - ortho team aware

## 2023-04-02 NOTE — PROGRESS NOTE ADULT - PROBLEM SELECTOR PLAN 3
Patient says he normal has low BPs  Since 3/31 his BPs have been elevated  Norvasc 10mg qdaily started on 3/31 and now BPs improved.   Patient noticed that less fluid has been removed from inpatient HD sessions compared to outpatient. He says 2 Liters is usually removed compared to 1.5 liters at [facility]  F/U nephrology recs

## 2023-04-02 NOTE — PROGRESS NOTE ADULT - ASSESSMENT
48 yo M sp multiple I&D of Right hand and forearm. Stable now with dry dressing awaiting repeat I&D and Integra placement in 4/4    Plan:  - DVT prophylaxis- ASA daily  - Dressing changed daily with telfa  - QD shower of forearm  - BID Aquaphor over dry skin  - Santyl over eschar  - Pain control   - Incentive spirometer  - Continuing Unasyn til 4/17  - OR: 4/4 for repeat I&D and integra placement: Needs preop and med clearance  - Monitor BP      For all questions related to patient care, please reach out to the on-call team via the pager.     Renee Pope, PGY 1  Orthopaedic Surgery  [facility] [id number]  [facility] [id number]  [facility] [id number]

## 2023-04-02 NOTE — PROGRESS NOTE ADULT - SUBJECTIVE AND OBJECTIVE BOX
Patient is a 47y old  Male who presents with a chief complaint of Right forearm gas gangrene (02 Apr 2023 06:11)      SUBJECTIVE / OVERNIGHT EVENTS:  Patient has no new complaints. Denies cp, SOB, abdominal pain, N/V/D     MEDICATIONS  (STANDING):  acetaminophen     Tablet .. 975 milliGRAM(s) Oral every 8 hours  amLODIPine   Tablet 10 milliGRAM(s) Oral daily  ampicillin/sulbactam  IVPB      ampicillin/sulbactam  IVPB 3 Gram(s) IV Intermittent every 24 hours  AQUAPHOR (petrolatum Ointment) 1 Application(s) Topical two times a day  aspirin  chewable 81 milliGRAM(s) Oral daily  atorvastatin 40 milliGRAM(s) Oral at bedtime  collagenase Ointment 1 Application(s) Topical two times a day  dextrose 5%. 1000 milliLiter(s) (50 mL/Hr) IV Continuous <Continuous>  dextrose 5%. 1000 milliLiter(s) (100 mL/Hr) IV Continuous <Continuous>  dextrose 50% Injectable 25 Gram(s) IV Push once  dextrose 50% Injectable 12.5 Gram(s) IV Push once  dextrose 50% Injectable 25 Gram(s) IV Push once  dorzolamide 2%/timolol 0.5% Ophthalmic Solution 1 Drop(s) Both EYES two times a day  epoetin deidre-epbx (RETACRIT) Injectable 14970 Unit(s) IV Push <User Schedule>  glucagon  Injectable 1 milliGRAM(s) IntraMuscular once  insulin glargine Injectable (LANTUS) 4 Unit(s) SubCutaneous at bedtime  insulin lispro (ADMELOG) corrective regimen sliding scale   SubCutaneous at bedtime  insulin lispro (ADMELOG) corrective regimen sliding scale   SubCutaneous three times a day before meals  senna 2 Tablet(s) Oral at bedtime  Sucroferric Oxyhydroxide 2500mg Tablet 2 Tablet(s) 2 Tablet(s) Oral three times a day    MEDICATIONS  (PRN):  dextrose Oral Gel 15 Gram(s) Oral once PRN Blood Glucose LESS THAN 70 milliGRAM(s)/deciliter  oxyCODONE    IR 5 milliGRAM(s) Oral every 4 hours PRN Moderate Pain (4 - 6)  oxyCODONE    IR 10 milliGRAM(s) Oral every 4 hours PRN Severe Pain (7 - 10)      Vital Signs Last 24 Hrs  T(C): 36.6 (02 Apr 2023 05:40), Max: 37.2 (01 Apr 2023 13:10)  T(F): 97.8 (02 Apr 2023 05:40), Max: 99 (01 Apr 2023 13:10)  HR: 61 (02 Apr 2023 05:40) (61 - 80)  BP: 141/66 (02 Apr 2023 05:40) (141/66 - 161/69)  BP(mean): --  RR: 18 (02 Apr 2023 05:40) (16 - 18)  SpO2: 100% (02 Apr 2023 05:40) (95% - 100%)    Parameters below as of 02 Apr 2023 05:40  Patient On (Oxygen Delivery Method): room air      CAPILLARY BLOOD GLUCOSE      POCT Blood Glucose.: 128 mg/dL (02 Apr 2023 07:03)  POCT Blood Glucose.: 182 mg/dL (01 Apr 2023 21:39)  POCT Blood Glucose.: 148 mg/dL (01 Apr 2023 16:37)  POCT Blood Glucose.: 167 mg/dL (01 Apr 2023 11:49)    I&O's Summary    01 Apr 2023 07:01  -  02 Apr 2023 07:00  --------------------------------------------------------  IN: 150 mL / OUT: 0 mL / NET: 150 mL        PHYSICAL EXAM:  CONSTITUTIONAL: no acute distress, conversant  EYES: conjunctiva and sclera clear  ENMT: Moist oral mucosa  RESPIRATORY: Normal respiratory effort; lungs are clear to auscultation bilaterally  CARDIOVASCULAR: Regular rate and rhythm, normal S1 and S2, no murmur/rub/gallop; No lower extremity edema  ABDOMEN: no tenderness to palpation, normoactive bowel sounds, no rebound/guarding  MSK: R arm ace wrapped, hand wound c/d/i  PSYCH: Alert; affect appropriate  NEUROLOGY: CN 2-12 are intact and symmetric; moving all extremities   SKIN: No rashes on examined skin    LABS:    03-31    138  |  95<L>  |  60<H>  ----------------------------<  144<H>  5.1   |  22  |  8.10<H>    Ca    8.5      31 Mar 2023 12:00                RADIOLOGY & ADDITIONAL TESTS:    Imaging Personally Reviewed:    Consultant(s) Notes Reviewed:      Care Discussed with Consultants/Other Providers:

## 2023-04-02 NOTE — PROGRESS NOTE ADULT - PROBLEM SELECTOR PLAN 2
Phone call to patient. Informed of results per Eloise Ibarra CNP. Patient verbalized understanding and had no additional questions. Patient thanked writer for the call.   patient w/o cp, exertional SOB, H/O CVA or MI. Patient does have H/O DM, ESRD on HD and PVD which puts hime as moderate risk fort moderate risk procedure. However has been to the OR multiple times in the last few months without perioperative complications. No objection to proceeding to OR w/o further testing.

## 2023-04-03 ENCOUNTER — TRANSCRIPTION ENCOUNTER (OUTPATIENT)
Age: 48
End: 2023-04-03

## 2023-04-03 LAB
ANION GAP SERPL CALC-SCNC: 22 MMOL/L — HIGH (ref 7–14)
APTT BLD: 32.8 SEC — SIGNIFICANT CHANGE UP (ref 27–36.3)
BLD GP AB SCN SERPL QL: POSITIVE — SIGNIFICANT CHANGE UP
BUN SERPL-MCNC: 78 MG/DL — HIGH (ref 7–23)
CALCIUM SERPL-MCNC: 8.2 MG/DL — LOW (ref 8.4–10.5)
CHLORIDE SERPL-SCNC: 96 MMOL/L — LOW (ref 98–107)
CO2 SERPL-SCNC: 18 MMOL/L — LOW (ref 22–31)
CREAT SERPL-MCNC: 9.25 MG/DL — HIGH (ref 0.5–1.3)
EGFR: 6 ML/MIN/1.73M2 — LOW
GLUCOSE BLDC GLUCOMTR-MCNC: 139 MG/DL — HIGH (ref 70–99)
GLUCOSE BLDC GLUCOMTR-MCNC: 151 MG/DL — HIGH (ref 70–99)
GLUCOSE BLDC GLUCOMTR-MCNC: 187 MG/DL — HIGH (ref 70–99)
GLUCOSE BLDC GLUCOMTR-MCNC: 206 MG/DL — HIGH (ref 70–99)
GLUCOSE SERPL-MCNC: 196 MG/DL — HIGH (ref 70–99)
HBV SURFACE AG SER-ACNC: SIGNIFICANT CHANGE UP
HCT VFR BLD CALC: 26.5 % — LOW (ref 39–50)
HGB BLD-MCNC: 8.1 G/DL — LOW (ref 13–17)
INR BLD: 1.2 RATIO — HIGH (ref 0.88–1.16)
MCHC RBC-ENTMCNC: 26.4 PG — LOW (ref 27–34)
MCHC RBC-ENTMCNC: 30.6 GM/DL — LOW (ref 32–36)
MCV RBC AUTO: 86.3 FL — SIGNIFICANT CHANGE UP (ref 80–100)
NRBC # BLD: 0 /100 WBCS — SIGNIFICANT CHANGE UP (ref 0–0)
NRBC # FLD: 0 K/UL — SIGNIFICANT CHANGE UP (ref 0–0)
PLATELET # BLD AUTO: 277 K/UL — SIGNIFICANT CHANGE UP (ref 150–400)
POTASSIUM SERPL-MCNC: 4.8 MMOL/L — SIGNIFICANT CHANGE UP (ref 3.5–5.3)
POTASSIUM SERPL-SCNC: 4.8 MMOL/L — SIGNIFICANT CHANGE UP (ref 3.5–5.3)
PROTHROM AB SERPL-ACNC: 13.9 SEC — HIGH (ref 10.5–13.4)
RBC # BLD: 3.07 M/UL — LOW (ref 4.2–5.8)
RBC # FLD: 14.8 % — HIGH (ref 10.3–14.5)
RH IG SCN BLD-IMP: POSITIVE — SIGNIFICANT CHANGE UP
SARS-COV-2 RNA SPEC QL NAA+PROBE: SIGNIFICANT CHANGE UP
SODIUM SERPL-SCNC: 136 MMOL/L — SIGNIFICANT CHANGE UP (ref 135–145)
WBC # BLD: 6.23 K/UL — SIGNIFICANT CHANGE UP (ref 3.8–10.5)
WBC # FLD AUTO: 6.23 K/UL — SIGNIFICANT CHANGE UP (ref 3.8–10.5)

## 2023-04-03 PROCEDURE — 99232 SBSQ HOSP IP/OBS MODERATE 35: CPT

## 2023-04-03 RX ORDER — SODIUM CHLORIDE 9 MG/ML
1000 INJECTION, SOLUTION INTRAVENOUS
Refills: 0 | Status: DISCONTINUED | OUTPATIENT
Start: 2023-04-03 | End: 2023-04-04

## 2023-04-03 RX ORDER — DEXTROSE MONOHYDRATE, SODIUM CHLORIDE, AND POTASSIUM CHLORIDE 50; .745; 4.5 G/1000ML; G/1000ML; G/1000ML
1000 INJECTION, SOLUTION INTRAVENOUS
Refills: 0 | Status: DISCONTINUED | OUTPATIENT
Start: 2023-04-03 | End: 2023-04-03

## 2023-04-03 RX ORDER — SODIUM CHLORIDE 9 MG/ML
1000 INJECTION, SOLUTION INTRAVENOUS
Refills: 0 | Status: DISCONTINUED | OUTPATIENT
Start: 2023-04-03 | End: 2023-04-03

## 2023-04-03 RX ADMIN — OXYCODONE HYDROCHLORIDE 10 MILLIGRAM(S): 5 TABLET ORAL at 06:18

## 2023-04-03 RX ADMIN — Medication 2: at 11:57

## 2023-04-03 RX ADMIN — Medication 2: at 16:58

## 2023-04-03 RX ADMIN — Medication 81 MILLIGRAM(S): at 17:05

## 2023-04-03 RX ADMIN — DORZOLAMIDE HYDROCHLORIDE TIMOLOL MALEATE 1 DROP(S): 20; 5 SOLUTION/ DROPS OPHTHALMIC at 05:19

## 2023-04-03 RX ADMIN — Medication 975 MILLIGRAM(S): at 05:18

## 2023-04-03 RX ADMIN — OXYCODONE HYDROCHLORIDE 10 MILLIGRAM(S): 5 TABLET ORAL at 17:01

## 2023-04-03 RX ADMIN — Medication 1 APPLICATION(S): at 21:33

## 2023-04-03 RX ADMIN — OXYCODONE HYDROCHLORIDE 10 MILLIGRAM(S): 5 TABLET ORAL at 05:18

## 2023-04-03 RX ADMIN — OXYCODONE HYDROCHLORIDE 10 MILLIGRAM(S): 5 TABLET ORAL at 17:57

## 2023-04-03 RX ADMIN — OXYCODONE HYDROCHLORIDE 10 MILLIGRAM(S): 5 TABLET ORAL at 22:33

## 2023-04-03 RX ADMIN — Medication 975 MILLIGRAM(S): at 16:57

## 2023-04-03 RX ADMIN — Medication 975 MILLIGRAM(S): at 17:57

## 2023-04-03 RX ADMIN — ERYTHROPOIETIN 10000 UNIT(S): 10000 INJECTION, SOLUTION INTRAVENOUS; SUBCUTANEOUS at 13:28

## 2023-04-03 RX ADMIN — Medication 1 APPLICATION(S): at 05:19

## 2023-04-03 RX ADMIN — ATORVASTATIN CALCIUM 40 MILLIGRAM(S): 80 TABLET, FILM COATED ORAL at 22:10

## 2023-04-03 RX ADMIN — DORZOLAMIDE HYDROCHLORIDE TIMOLOL MALEATE 1 DROP(S): 20; 5 SOLUTION/ DROPS OPHTHALMIC at 17:06

## 2023-04-03 RX ADMIN — AMLODIPINE BESYLATE 10 MILLIGRAM(S): 2.5 TABLET ORAL at 05:37

## 2023-04-03 RX ADMIN — Medication 975 MILLIGRAM(S): at 06:18

## 2023-04-03 RX ADMIN — OXYCODONE HYDROCHLORIDE 10 MILLIGRAM(S): 5 TABLET ORAL at 21:33

## 2023-04-03 RX ADMIN — INSULIN GLARGINE 4 UNIT(S): 100 INJECTION, SOLUTION SUBCUTANEOUS at 22:10

## 2023-04-03 RX ADMIN — AMPICILLIN SODIUM AND SULBACTAM SODIUM 200 GRAM(S): 250; 125 INJECTION, POWDER, FOR SUSPENSION INTRAMUSCULAR; INTRAVENOUS at 16:53

## 2023-04-03 NOTE — PROGRESS NOTE ADULT - SUBJECTIVE AND OBJECTIVE BOX
Patient seen and examined at bedside. Reports no acute complaints at this time. Pain is well controlled. No acute events overnight.    ICU Vital Signs Last 24 Hrs  T(C): 36.2 (03 Apr 2023 05:29), Max: 36.7 (02 Apr 2023 17:41)  T(F): 97.2 (03 Apr 2023 05:29), Max: 98 (02 Apr 2023 17:41)  HR: 64 (03 Apr 2023 05:29) (62 - 75)  BP: 138/61 (03 Apr 2023 05:29) (128/63 - 159/69)  BP(mean): --  ABP: --  ABP(mean): --  RR: 18 (03 Apr 2023 05:29) (18 - 18)  SpO2: 100% (03 Apr 2023 05:29) (100% - 100%)    O2 Parameters below as of 03 Apr 2023 05:29  Patient On (Oxygen Delivery Method): room air            Physical Examination:  General: NAD  Resp: Non-labored breathing, no accessory muscle use  Left Upper extremity:       Anterior wound volar forearm otherwise skin intact with dry skin       minimal eschar over digits       WWP          48 yo M sp multiple I&D of Right hand and forearm. Stable now with dry dressing awaiting repeat I&D and Integra placement in 4/4    Plan:  - DVT prophylaxis- ASA daily  - Dressing changed daily with telfa  - QD shower of forearm  - BID Aquaphor over dry skin  - Santyl over eschar  - Pain control   - Incentive spirometer  - Continuing Unasyn til 4/17  - OR: 4/4 for repeat I&D and integra placement: Needs preop and med clearance  - Monitor BP

## 2023-04-03 NOTE — PROGRESS NOTE ADULT - PROBLEM SELECTOR PLAN 6
-diabetic retinopathy.  severe proliferative diabetic retinopathy, previously on cosopt, ophtho outpt follow up   -CmZ2v=0.5%  -c/w lantus 4 and ss  -c/w ASA daily  -c/w Atorvastatin 40 mg daily (started 3/31)

## 2023-04-03 NOTE — PROGRESS NOTE ADULT - PROBLEM SELECTOR PLAN 5
-renal consulted for HD - MWF  -renally dose medications   -anemia of renal disease, c/w epo during HD as per renal  -renal restricted diet w/ nepro supplements    Note- patient has appointment at [place] for renal transplant evaluation on 4/20 and requesting discharge by 4/14 to facilitate getting to this appointment - ortho team aware

## 2023-04-03 NOTE — PROGRESS NOTE ADULT - PROBLEM SELECTOR PLAN 3
Patient says he normal has low BPs  Since 3/31 his BPs have been elevated  Norvasc 10mg qdaily started on 3/31 and now BPs improved.   Patient noticed that less fluid has been removed from inpatient HD sessions compared to outpatient. He says 2 Liters is usually removed compared to 1.5 liters at [facility]  F/U nephrology recs

## 2023-04-03 NOTE — PROGRESS NOTE ADULT - PROBLEM SELECTOR PLAN 2
patient w/o cp, exertional SOB, H/O CVA or MI. Patient does have H/O DM, ESRD on HD and PVD which puts him as moderate risk fort moderate risk procedure. However has been to the OR multiple times in the last few months without perioperative complications. No objection to proceeding to OR w/o further testing.

## 2023-04-03 NOTE — PROGRESS NOTE ADULT - SUBJECTIVE AND OBJECTIVE BOX
CHIEF COMPLAINT: f/u     SUBJECTIVE / OVERNIGHT EVENTS: Patient seen and examined. No acute events overnight. Patient states he feels "fine" today and has no complaints.     MEDICATIONS  (STANDING):  acetaminophen     Tablet .. 975 milliGRAM(s) Oral every 8 hours  amLODIPine   Tablet 10 milliGRAM(s) Oral daily  ampicillin/sulbactam  IVPB      ampicillin/sulbactam  IVPB 3 Gram(s) IV Intermittent every 24 hours  AQUAPHOR (petrolatum Ointment) 1 Application(s) Topical two times a day  aspirin  chewable 81 milliGRAM(s) Oral daily  atorvastatin 40 milliGRAM(s) Oral at bedtime  collagenase Ointment 1 Application(s) Topical two times a day  dextrose 5%. 1000 milliLiter(s) (50 mL/Hr) IV Continuous <Continuous>  dextrose 5%. 1000 milliLiter(s) (100 mL/Hr) IV Continuous <Continuous>  dextrose 50% Injectable 25 Gram(s) IV Push once  dextrose 50% Injectable 12.5 Gram(s) IV Push once  dextrose 50% Injectable 25 Gram(s) IV Push once  dorzolamide 2%/timolol 0.5% Ophthalmic Solution 1 Drop(s) Both EYES two times a day  epoetin deidre-epbx (RETACRIT) Injectable 61935 Unit(s) IV Push <User Schedule>  glucagon  Injectable 1 milliGRAM(s) IntraMuscular once  insulin glargine Injectable (LANTUS) 4 Unit(s) SubCutaneous at bedtime  insulin lispro (ADMELOG) corrective regimen sliding scale   SubCutaneous at bedtime  insulin lispro (ADMELOG) corrective regimen sliding scale   SubCutaneous three times a day before meals  senna 2 Tablet(s) Oral at bedtime  Sucroferric Oxyhydroxide 2500mg Tablet 2 Tablet(s) 2 Tablet(s) Oral three times a day    MEDICATIONS  (PRN):  dextrose Oral Gel 15 Gram(s) Oral once PRN Blood Glucose LESS THAN 70 milliGRAM(s)/deciliter  oxyCODONE    IR 5 milliGRAM(s) Oral every 4 hours PRN Moderate Pain (4 - 6)  oxyCODONE    IR 10 milliGRAM(s) Oral every 4 hours PRN Severe Pain (7 - 10)      VITALS:  T(F): 98.1 (04-03-23 @ 09:33), Max: 98.1 (04-03-23 @ 09:33)  HR: 63 (04-03-23 @ 09:33) (62 - 75)  BP: 109/- (04-03-23 @ 09:33) (109/- - 159/69)  RR: 18 (04-03-23 @ 09:33) (18 - 18)  SpO2: 99% (04-03-23 @ 09:33)  Wt(kg): --      PHYSICAL EXAM:  GENERAL: NAD, well-developed  EYES: conjunctiva and sclera clear  CHEST/LUNG: Clear to auscultation bilaterally; No wheeze  HEART: Regular rate and rhythm; No murmurs, rubs, or gallops  ABDOMEN: Soft, Nontender, Nondistended; Bowel sounds present  EXTREMITIES:  s/p bilateral BKA; R forearm ACE wrapped, hand wound c/d/i   PSYCH: AAOx3    [x] Care Discussed with Consultants/Other Providers: Orthopedic PA - discussed

## 2023-04-03 NOTE — PROGRESS NOTE ADULT - ASSESSMENT
This is a 46yo [place] speaking male with PMH of DM2, b/l BKA, ESRD (on HD MWF) well known to Orthopedics for treatment of right third finger 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 Sin/Dr. Singh) who was seen by Dr. Sin today in the office and sent in to [facility] for urgent irrigation and debridement. Patient states he missed dialysis today because he was instructed to come straight to the ED.     PAST MEDICAL & SURGICAL HISTORY:  ESRD on dialysis  H/O hypotension  Diabetes mellitus  S/P BKA (below knee amputation) bilateral  AV fistula (06 Mar 2023 17:32)      BP monitoring,continue current antihypertensive meds, low salt diet,followup with PMD in 1-2 weeks        esrd on hd , will plan for hd as order    Excess fluids and waste products will be removed from your blood; your electrolytes will be balanced; your blood pressure will be controlled.      ANEMIA PLAN:  Anemia of chronic disease:  We will continue epo aiming for a HCT of 32-36 %.   We will monitor Iron stores, B12 and RBC folate .      send blood and urine cx,serial lactate levels,monitor vitals closley,ivfs hydration,monitor urine output and renal profile,iv abx

## 2023-04-03 NOTE — CHART NOTE - NSCHARTNOTEFT_GEN_A_CORE
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    Source: [X] Patient [ ] Family [ ] RN [ ] Chart      Diet, NPO after Midnight:      NPO Start Date: 03-Apr-2023,   NPO Start Time: 23:59  Except Medications (04-03-23 @ 04:16)  Diet, Consistent Carbohydrate Renal w/Evening Snack:   Supplement Feeding Modality:  Oral  Nepro Cans or Servings Per Day:  1       Frequency:  Two Times a day (03-07-23 @ 00:17)    GI: WDL. Last BM noted on [ 3/30 ]    PO intake:  [ ] Poor < 50%  [X] Fair 50-75% [ ] Good  % [ ] Inconsistent PO intake    Enteral /Parenteral Nutrition:       [ X ] n/a    Anthropometrics: Height (cm): 170.2 (03-07), 107.2 (02-17), 170.2 (01-09)  Weight (kg): 77.1 (03-07), 65.8 (02-17), 65.8 (01-09)  BMI (kg/m2): 26.6 (03-07), 57.3 (02-17), 22.7 (01-09)    Edema: 1+ edema to rt arm and hand    Pressure Injuries: No pressure injuries noted     _______________ Pertinent Medications_______________  MEDICATIONS  (STANDING):  acetaminophen     Tablet .. 975 milliGRAM(s) Oral every 8 hours  amLODIPine   Tablet 10 milliGRAM(s) Oral daily  ampicillin/sulbactam  IVPB      ampicillin/sulbactam  IVPB 3 Gram(s) IV Intermittent every 24 hours  AQUAPHOR (petrolatum Ointment) 1 Application(s) Topical two times a day  aspirin  chewable 81 milliGRAM(s) Oral daily  atorvastatin 40 milliGRAM(s) Oral at bedtime  collagenase Ointment 1 Application(s) Topical two times a day  dextrose 5% + sodium chloride 0.45%. 1000 milliLiter(s) (75 mL/Hr) IV Continuous <Continuous>  dextrose 5%. 1000 milliLiter(s) (50 mL/Hr) IV Continuous <Continuous>  dextrose 5%. 1000 milliLiter(s) (100 mL/Hr) IV Continuous <Continuous>  dextrose 50% Injectable 25 Gram(s) IV Push once  dextrose 50% Injectable 12.5 Gram(s) IV Push once  dextrose 50% Injectable 25 Gram(s) IV Push once  dorzolamide 2%/timolol 0.5% Ophthalmic Solution 1 Drop(s) Both EYES two times a day  epoetin deidre-epbx (RETACRIT) Injectable 63286 Unit(s) IV Push <User Schedule>  glucagon  Injectable 1 milliGRAM(s) IntraMuscular once  insulin glargine Injectable (LANTUS) 4 Unit(s) SubCutaneous at bedtime  insulin lispro (ADMELOG) corrective regimen sliding scale   SubCutaneous at bedtime  insulin lispro (ADMELOG) corrective regimen sliding scale   SubCutaneous three times a day before meals  senna 2 Tablet(s) Oral at bedtime  Sucroferric Oxyhydroxide 2500mg Tablet 2 Tablet(s) 2 Tablet(s) Oral three times a day    MEDICATIONS  (PRN):  dextrose Oral Gel 15 Gram(s) Oral once PRN Blood Glucose LESS THAN 70 milliGRAM(s)/deciliter  oxyCODONE    IR 5 milliGRAM(s) Oral every 4 hours PRN Moderate Pain (4 - 6)  oxyCODONE    IR 10 milliGRAM(s) Oral every 4 hours PRN Severe Pain (7 - 10)      __________________ Pertinent Labs__________________   04-03 Na136 mmol/L Glu 196 mg/dL<H> K+ 4.8 mmol/L Cr  9.25 mg/dL<H> BUN 78 mg/dL<H>    POCT Blood Glucose.: 187 mg/dL (04-03-23 @ 11:12)  POCT Blood Glucose.: 139 mg/dL (04-03-23 @ 07:22)  POCT Blood Glucose.: 137 mg/dL (04-02-23 @ 21:38)  POCT Blood Glucose.: 219 mg/dL (04-02-23 @ 16:35)  POCT Blood Glucose.: 150 mg/dL (04-02-23 @ 11:18)  POCT Blood Glucose.: 128 mg/dL (04-02-23 @ 07:03)  POCT Blood Glucose.: 182 mg/dL (04-01-23 @ 21:39)  POCT Blood Glucose.: 148 mg/dL (04-01-23 @ 16:37)  POCT Blood Glucose.: 167 mg/dL (04-01-23 @ 11:49)  POCT Blood Glucose.: 161 mg/dL (04-01-23 @ 07:10)  POCT Blood Glucose.: 200 mg/dL (03-31-23 @ 21:50)  POCT Blood Glucose.: 122 mg/dL (03-31-23 @ 16:35)      Estimated Needs:   [X] no change since previous assessment  [ ] recalculated:     Previous Nutrition Diagnosis: Increased Nutrient Needs - kcal and protein    Nutrition Diagnosis is [X ] ongoing  [ ] resolved [ ] not applicable     New Nutrition Diagnosis: n/a    Monitoring and Evaluation:     [ x ] Tolerance to diet prescription / adequacy of meal intake  [ x ] Weight trends   [ x ] Pertinent labs    Recommendations:  1) Diet / Supplement Changes: Continue diet as ordered with Nepro 2x daily (850 kcals, 38.2g protein).   2) Obtain weight as feasible.  3) Please consistently document % meal intake in nursing flowsheets.     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    Patient asleep at time of visit, receiving HD.  Fair to good PO intake reported by staff.  No acute nutritional issues made known.    Source: [ ] Patient [ ] Family [X] RN [ ] Chart      Diet, NPO after Midnight:      NPO Start Date: 03-Apr-2023,   NPO Start Time: 23:59  Except Medications (04-03-23 @ 04:16)  Diet, Consistent Carbohydrate Renal w/Evening Snack:   Supplement Feeding Modality:  Oral  Nepro Cans or Servings Per Day:  1       Frequency:  Two Times a day (03-07-23 @ 00:17)    GI: WDL. Last BM noted on [ 3/30 ]    PO intake:  [ ] Poor < 50%  [X] Fair 50-75% [ ] Good  % [ ] Inconsistent PO intake    Enteral /Parenteral Nutrition:       [ X ] n/a    Weight: Bedscale noted to be malfunctioning. No new weights to assess.    Weight Hx - 3/21 - 71kg      3/20 - 68kg      3/16 - 66.8kg      3/15 - 67.4kg  Adjusted BW for B/L BKAs - 59.3kg    Edema: 1+ edema to rt arm and hand    Pressure Injuries: No pressure injuries noted     _______________ Pertinent Medications_______________  MEDICATIONS  (STANDING):  acetaminophen     Tablet .. 975 milliGRAM(s) Oral every 8 hours  amLODIPine   Tablet 10 milliGRAM(s) Oral daily  ampicillin/sulbactam  IVPB      ampicillin/sulbactam  IVPB 3 Gram(s) IV Intermittent every 24 hours  AQUAPHOR (petrolatum Ointment) 1 Application(s) Topical two times a day  aspirin  chewable 81 milliGRAM(s) Oral daily  atorvastatin 40 milliGRAM(s) Oral at bedtime  collagenase Ointment 1 Application(s) Topical two times a day  dextrose 5% + sodium chloride 0.45%. 1000 milliLiter(s) (75 mL/Hr) IV Continuous <Continuous>  dextrose 5%. 1000 milliLiter(s) (50 mL/Hr) IV Continuous <Continuous>  dextrose 5%. 1000 milliLiter(s) (100 mL/Hr) IV Continuous <Continuous>  dextrose 50% Injectable 25 Gram(s) IV Push once  dextrose 50% Injectable 12.5 Gram(s) IV Push once  dextrose 50% Injectable 25 Gram(s) IV Push once  dorzolamide 2%/timolol 0.5% Ophthalmic Solution 1 Drop(s) Both EYES two times a day  epoetin deidre-epbx (RETACRIT) Injectable 62861 Unit(s) IV Push <User Schedule>  glucagon  Injectable 1 milliGRAM(s) IntraMuscular once  insulin glargine Injectable (LANTUS) 4 Unit(s) SubCutaneous at bedtime  insulin lispro (ADMELOG) corrective regimen sliding scale   SubCutaneous at bedtime  insulin lispro (ADMELOG) corrective regimen sliding scale   SubCutaneous three times a day before meals  senna 2 Tablet(s) Oral at bedtime  Sucroferric Oxyhydroxide 2500mg Tablet 2 Tablet(s) 2 Tablet(s) Oral three times a day    MEDICATIONS  (PRN):  dextrose Oral Gel 15 Gram(s) Oral once PRN Blood Glucose LESS THAN 70 milliGRAM(s)/deciliter  oxyCODONE    IR 5 milliGRAM(s) Oral every 4 hours PRN Moderate Pain (4 - 6)  oxyCODONE    IR 10 milliGRAM(s) Oral every 4 hours PRN Severe Pain (7 - 10)      __________________ Pertinent Labs__________________   04-03 Na136 mmol/L Glu 196 mg/dL<H> K+ 4.8 mmol/L Cr  9.25 mg/dL<H> BUN 78 mg/dL<H>    POCT Blood Glucose.: 187 mg/dL (04-03-23 @ 11:12)  POCT Blood Glucose.: 139 mg/dL (04-03-23 @ 07:22)  POCT Blood Glucose.: 137 mg/dL (04-02-23 @ 21:38)  POCT Blood Glucose.: 219 mg/dL (04-02-23 @ 16:35)  POCT Blood Glucose.: 150 mg/dL (04-02-23 @ 11:18)  POCT Blood Glucose.: 128 mg/dL (04-02-23 @ 07:03)  POCT Blood Glucose.: 182 mg/dL (04-01-23 @ 21:39)  POCT Blood Glucose.: 148 mg/dL (04-01-23 @ 16:37)  POCT Blood Glucose.: 167 mg/dL (04-01-23 @ 11:49)  POCT Blood Glucose.: 161 mg/dL (04-01-23 @ 07:10)  POCT Blood Glucose.: 200 mg/dL (03-31-23 @ 21:50)  POCT Blood Glucose.: 122 mg/dL (03-31-23 @ 16:35)      Estimated Needs:   [X] no change since previous assessment  [ ] recalculated:     Previous Nutrition Diagnosis: Increased Nutrient Needs - kcal and protein    Nutrition Diagnosis is [X ] ongoing  [ ] resolved [ ] not applicable     New Nutrition Diagnosis: n/a    Monitoring and Evaluation:     [ x ] Tolerance to diet prescription / adequacy of meal intake  [ x ] Weight trends   [ x ] Pertinent labs    Recommendations:  1) Diet / Supplement Changes: Continue diet as ordered with Nepro 2x daily (850 kcals, 38.2g protein).   2) Obtain weights as feasible.  3) Please consistently document % meal intake in nursing flowsheets.     Rubi Spence, MS, RDN, CDN  Pager [phone]  Also available on MS Teams

## 2023-04-04 LAB
ANION GAP SERPL CALC-SCNC: 17 MMOL/L — HIGH (ref 7–14)
ANION GAP SERPL CALC-SCNC: 19 MMOL/L — HIGH (ref 7–14)
APTT BLD: 32.2 SEC — SIGNIFICANT CHANGE UP (ref 27–36.3)
BUN SERPL-MCNC: 42 MG/DL — HIGH (ref 7–23)
BUN SERPL-MCNC: 48 MG/DL — HIGH (ref 7–23)
CALCIUM SERPL-MCNC: 8.6 MG/DL — SIGNIFICANT CHANGE UP (ref 8.4–10.5)
CALCIUM SERPL-MCNC: 8.9 MG/DL — SIGNIFICANT CHANGE UP (ref 8.4–10.5)
CHLORIDE SERPL-SCNC: 96 MMOL/L — LOW (ref 98–107)
CHLORIDE SERPL-SCNC: 96 MMOL/L — LOW (ref 98–107)
CO2 SERPL-SCNC: 21 MMOL/L — LOW (ref 22–31)
CO2 SERPL-SCNC: 25 MMOL/L — SIGNIFICANT CHANGE UP (ref 22–31)
CREAT SERPL-MCNC: 5.72 MG/DL — HIGH (ref 0.5–1.3)
CREAT SERPL-MCNC: 6.96 MG/DL — HIGH (ref 0.5–1.3)
EGFR: 11 ML/MIN/1.73M2 — LOW
EGFR: 9 ML/MIN/1.73M2 — LOW
GLUCOSE BLDC GLUCOMTR-MCNC: 108 MG/DL — HIGH (ref 70–99)
GLUCOSE BLDC GLUCOMTR-MCNC: 108 MG/DL — HIGH (ref 70–99)
GLUCOSE BLDC GLUCOMTR-MCNC: 133 MG/DL — HIGH (ref 70–99)
GLUCOSE BLDC GLUCOMTR-MCNC: 155 MG/DL — HIGH (ref 70–99)
GLUCOSE BLDC GLUCOMTR-MCNC: 243 MG/DL — HIGH (ref 70–99)
GLUCOSE SERPL-MCNC: 112 MG/DL — HIGH (ref 70–99)
GLUCOSE SERPL-MCNC: 212 MG/DL — HIGH (ref 70–99)
HCT VFR BLD CALC: 30.2 % — LOW (ref 39–50)
HCT VFR BLD CALC: 33 % — LOW (ref 39–50)
HGB BLD-MCNC: 9.2 G/DL — LOW (ref 13–17)
HGB BLD-MCNC: 9.8 G/DL — LOW (ref 13–17)
INR BLD: 1.21 RATIO — HIGH (ref 0.88–1.16)
MCHC RBC-ENTMCNC: 26.5 PG — LOW (ref 27–34)
MCHC RBC-ENTMCNC: 26.5 PG — LOW (ref 27–34)
MCHC RBC-ENTMCNC: 29.7 GM/DL — LOW (ref 32–36)
MCHC RBC-ENTMCNC: 30.5 GM/DL — LOW (ref 32–36)
MCV RBC AUTO: 87 FL — SIGNIFICANT CHANGE UP (ref 80–100)
MCV RBC AUTO: 89.2 FL — SIGNIFICANT CHANGE UP (ref 80–100)
NRBC # BLD: 0 /100 WBCS — SIGNIFICANT CHANGE UP (ref 0–0)
NRBC # BLD: 0 /100 WBCS — SIGNIFICANT CHANGE UP (ref 0–0)
NRBC # FLD: 0 K/UL — SIGNIFICANT CHANGE UP (ref 0–0)
NRBC # FLD: 0 K/UL — SIGNIFICANT CHANGE UP (ref 0–0)
PLATELET # BLD AUTO: 314 K/UL — SIGNIFICANT CHANGE UP (ref 150–400)
PLATELET # BLD AUTO: 334 K/UL — SIGNIFICANT CHANGE UP (ref 150–400)
POTASSIUM SERPL-MCNC: 4.3 MMOL/L — SIGNIFICANT CHANGE UP (ref 3.5–5.3)
POTASSIUM SERPL-MCNC: 4.6 MMOL/L — SIGNIFICANT CHANGE UP (ref 3.5–5.3)
POTASSIUM SERPL-SCNC: 4.3 MMOL/L — SIGNIFICANT CHANGE UP (ref 3.5–5.3)
POTASSIUM SERPL-SCNC: 4.6 MMOL/L — SIGNIFICANT CHANGE UP (ref 3.5–5.3)
PROTHROM AB SERPL-ACNC: 14.1 SEC — HIGH (ref 10.5–13.4)
RBC # BLD: 3.47 M/UL — LOW (ref 4.2–5.8)
RBC # BLD: 3.7 M/UL — LOW (ref 4.2–5.8)
RBC # FLD: 14.6 % — HIGH (ref 10.3–14.5)
RBC # FLD: 14.6 % — HIGH (ref 10.3–14.5)
SODIUM SERPL-SCNC: 136 MMOL/L — SIGNIFICANT CHANGE UP (ref 135–145)
SODIUM SERPL-SCNC: 138 MMOL/L — SIGNIFICANT CHANGE UP (ref 135–145)
WBC # BLD: 5.53 K/UL — SIGNIFICANT CHANGE UP (ref 3.8–10.5)
WBC # BLD: 5.99 K/UL — SIGNIFICANT CHANGE UP (ref 3.8–10.5)
WBC # FLD AUTO: 5.53 K/UL — SIGNIFICANT CHANGE UP (ref 3.8–10.5)
WBC # FLD AUTO: 5.99 K/UL — SIGNIFICANT CHANGE UP (ref 3.8–10.5)

## 2023-04-04 PROCEDURE — 15002 WOUND PREP TRK/ARM/LEG: CPT | Mod: 58,RT

## 2023-04-04 PROCEDURE — 99232 SBSQ HOSP IP/OBS MODERATE 35: CPT

## 2023-04-04 PROCEDURE — 15271 SKIN SUB GRAFT TRNK/ARM/LEG: CPT | Mod: 58,RT

## 2023-04-04 DEVICE — INTEGRA WOUND MATRIX MESHED BILAYER 4X5": Type: IMPLANTABLE DEVICE | Status: FUNCTIONAL

## 2023-04-04 RX ORDER — ACETAMINOPHEN 500 MG
1000 TABLET ORAL ONCE
Refills: 0 | Status: DISCONTINUED | OUTPATIENT
Start: 2023-04-04 | End: 2023-04-04

## 2023-04-04 RX ORDER — POVIDONE-IODINE 5 %
1 AEROSOL (ML) TOPICAL ONCE
Refills: 0 | Status: COMPLETED | OUTPATIENT
Start: 2023-04-04 | End: 2023-04-04

## 2023-04-04 RX ORDER — HYDROMORPHONE HYDROCHLORIDE 2 MG/ML
0.5 INJECTION INTRAMUSCULAR; INTRAVENOUS; SUBCUTANEOUS
Refills: 0 | Status: DISCONTINUED | OUTPATIENT
Start: 2023-04-04 | End: 2023-04-04

## 2023-04-04 RX ORDER — OXYCODONE HYDROCHLORIDE 5 MG/1
5 TABLET ORAL ONCE
Refills: 0 | Status: DISCONTINUED | OUTPATIENT
Start: 2023-04-04 | End: 2023-04-04

## 2023-04-04 RX ORDER — CHLORHEXIDINE GLUCONATE 213 G/1000ML
1 SOLUTION TOPICAL ONCE
Refills: 0 | Status: COMPLETED | OUTPATIENT
Start: 2023-04-04 | End: 2023-04-04

## 2023-04-04 RX ORDER — ONDANSETRON 8 MG/1
4 TABLET, FILM COATED ORAL ONCE
Refills: 0 | Status: DISCONTINUED | OUTPATIENT
Start: 2023-04-04 | End: 2023-04-04

## 2023-04-04 RX ADMIN — OXYCODONE HYDROCHLORIDE 10 MILLIGRAM(S): 5 TABLET ORAL at 11:13

## 2023-04-04 RX ADMIN — OXYCODONE HYDROCHLORIDE 10 MILLIGRAM(S): 5 TABLET ORAL at 14:27

## 2023-04-04 RX ADMIN — ATORVASTATIN CALCIUM 40 MILLIGRAM(S): 80 TABLET, FILM COATED ORAL at 21:17

## 2023-04-04 RX ADMIN — Medication 975 MILLIGRAM(S): at 01:58

## 2023-04-04 RX ADMIN — AMLODIPINE BESYLATE 10 MILLIGRAM(S): 2.5 TABLET ORAL at 05:56

## 2023-04-04 RX ADMIN — SODIUM CHLORIDE 75 MILLILITER(S): 9 INJECTION, SOLUTION INTRAVENOUS at 01:00

## 2023-04-04 RX ADMIN — DORZOLAMIDE HYDROCHLORIDE TIMOLOL MALEATE 1 DROP(S): 20; 5 SOLUTION/ DROPS OPHTHALMIC at 05:56

## 2023-04-04 RX ADMIN — Medication 975 MILLIGRAM(S): at 21:17

## 2023-04-04 RX ADMIN — Medication 81 MILLIGRAM(S): at 12:33

## 2023-04-04 RX ADMIN — Medication 1 APPLICATION(S): at 15:30

## 2023-04-04 RX ADMIN — Medication 975 MILLIGRAM(S): at 00:58

## 2023-04-04 RX ADMIN — OXYCODONE HYDROCHLORIDE 10 MILLIGRAM(S): 5 TABLET ORAL at 21:46

## 2023-04-04 RX ADMIN — CHLORHEXIDINE GLUCONATE 1 APPLICATION(S): 213 SOLUTION TOPICAL at 15:41

## 2023-04-04 RX ADMIN — AMPICILLIN SODIUM AND SULBACTAM SODIUM 200 GRAM(S): 250; 125 INJECTION, POWDER, FOR SUSPENSION INTRAMUSCULAR; INTRAVENOUS at 15:40

## 2023-04-04 RX ADMIN — Medication 1 APPLICATION(S): at 05:56

## 2023-04-04 RX ADMIN — OXYCODONE HYDROCHLORIDE 10 MILLIGRAM(S): 5 TABLET ORAL at 10:13

## 2023-04-04 RX ADMIN — INSULIN GLARGINE 4 UNIT(S): 100 INJECTION, SOLUTION SUBCUTANEOUS at 22:49

## 2023-04-04 RX ADMIN — Medication 4: at 07:23

## 2023-04-04 RX ADMIN — DORZOLAMIDE HYDROCHLORIDE TIMOLOL MALEATE 1 DROP(S): 20; 5 SOLUTION/ DROPS OPHTHALMIC at 22:50

## 2023-04-04 RX ADMIN — Medication 975 MILLIGRAM(S): at 10:13

## 2023-04-04 RX ADMIN — OXYCODONE HYDROCHLORIDE 10 MILLIGRAM(S): 5 TABLET ORAL at 15:27

## 2023-04-04 RX ADMIN — Medication 975 MILLIGRAM(S): at 21:47

## 2023-04-04 NOTE — PROGRESS NOTE ADULT - ASSESSMENT
Short Stay Endoscopy History and Physical    PCP - Oumra Quick MD    Procedure - EGD/colonoscopy  ASA - II  Mallampati - per anesthesia  History of Anesthesia problems - no  Family history Anesthesia problems - no     HPI:  This is a 52 y.o. female here for evaluation of : Abdominal pain. Colon screen    ROS:  Constitutional: No fevers, chills, No weight loss  ENT: No allergies  CV: No chest pain  Pulm: No shortness of breath  GI: see HPI  Derm: No rash    Medical History:  has a past medical history of Foot fracture.    Surgical History:  has a past surgical history that includes Intrauterine device insertion and Breast cyst aspiration (Left).    Family History: family history includes Heart disease in her father.. Otherwise no colon cancer, inflammatory bowel disease, or GI malignancies.    Social History:  reports that she has been smoking cigarettes. She has been smoking about 10.00 packs per day. She does not have any smokeless tobacco history on file. She reports that she does not drink alcohol or use drugs.    Review of patient's allergies indicates:  No Known Allergies    Medications:   Medications Prior to Admission   Medication Sig Dispense Refill Last Dose    ketotifen (ALLERGY EYE, KETOTIFEN,) 0.025 % (0.035 %) ophthalmic solution Place 1 drop into both eyes 2 (two) times daily. 10 mL 0 12/10/2019 at Unknown time    levothyroxine (SYNTHROID) 50 MCG tablet TAKE 1 TABLET BY MOUTH ONCE DAILY 90 tablet 0 12/10/2019 at Unknown time         Objective Findings:    Vital Signs: see nursing notes  Physical Exam:  General Appearance: Well appearing in no acute distress  Eyes:    No scleral icterus  ENT: Neck supple  Lungs: CTA anteriorly  Heart:  S1, S2 normal, no murmurs heard  Abdomen: Soft, non tender, non distended with positive bowel sounds. No hepatosplenomegaly, ascites, or mass  Extremities: no edema  Skin: No rash      Labs:  Lab Results   Component Value Date    WBC 6.00 10/30/2019    HGB  14.7 10/30/2019    HCT 45.3 10/30/2019     10/30/2019    CHOL 200 (H) 10/30/2019    TRIG 88 10/30/2019    HDL 76 (H) 10/30/2019    ALT 55 (H) 10/30/2019    AST 43 (H) 10/30/2019     10/30/2019    K 4.0 10/30/2019     10/30/2019    CREATININE 0.9 10/30/2019    BUN 14 10/30/2019    CO2 28 10/30/2019    TSH 4.863 (H) 10/30/2019    HGBA1C 5.3 11/04/2015       I have explained the risks and benefits of endoscopy procedures to the patient including but not limited to bleeding, perforation, infection, and death.    Sree Marrero MD     46 yo M sp multiple I&D of Right hand and forearm. Stable now with dry dressing awaiting repeat I&D and Integra placement in 4/4    Plan:  - DVT prophylaxis- ASA daily  - Dressing changed daily   - QD shower of forearm  - BID Aquaphor over dry skin  - Santyl over eschar  - Pain control   - Incentive spirometer  - Continuing Unasyn til 4/17  - OR: 4/4 for repeat I&D and integra placement  - NPO  - Monitor BP

## 2023-04-04 NOTE — BRIEF OPERATIVE NOTE - NSICDXBRIEFPROCEDURE_GEN_ALL_CORE_FT
PROCEDURES:  Irrigation and debridement of bone of hand 07-Mar-2023 00:07:53  Jesus Oh  
PROCEDURES:  Irrigation and debridement of bone of hand 07-Mar-2023 00:07:53  Jesus Oh

## 2023-04-04 NOTE — PROGRESS NOTE ADULT - SUBJECTIVE AND OBJECTIVE BOX
CHIEF COMPLAINT: f/u     SUBJECTIVE / OVERNIGHT EVENTS: Patient seen and examined. No acute events overnight. Feels well and has no complaints.     MEDICATIONS  (STANDING):  acetaminophen     Tablet .. 975 milliGRAM(s) Oral every 8 hours  amLODIPine   Tablet 10 milliGRAM(s) Oral daily  ampicillin/sulbactam  IVPB      ampicillin/sulbactam  IVPB 3 Gram(s) IV Intermittent every 24 hours  AQUAPHOR (petrolatum Ointment) 1 Application(s) Topical two times a day  aspirin  chewable 81 milliGRAM(s) Oral daily  atorvastatin 40 milliGRAM(s) Oral at bedtime  chlorhexidine 2% Cloths 1 Application(s) Topical once  collagenase Ointment 1 Application(s) Topical two times a day  dextrose 5% + sodium chloride 0.45%. 1000 milliLiter(s) (75 mL/Hr) IV Continuous <Continuous>  dextrose 5%. 1000 milliLiter(s) (100 mL/Hr) IV Continuous <Continuous>  dextrose 5%. 1000 milliLiter(s) (50 mL/Hr) IV Continuous <Continuous>  dextrose 50% Injectable 25 Gram(s) IV Push once  dextrose 50% Injectable 12.5 Gram(s) IV Push once  dextrose 50% Injectable 25 Gram(s) IV Push once  dorzolamide 2%/timolol 0.5% Ophthalmic Solution 1 Drop(s) Both EYES two times a day  epoetin deidre-epbx (RETACRIT) Injectable 31198 Unit(s) IV Push <User Schedule>  glucagon  Injectable 1 milliGRAM(s) IntraMuscular once  insulin glargine Injectable (LANTUS) 4 Unit(s) SubCutaneous at bedtime  insulin lispro (ADMELOG) corrective regimen sliding scale   SubCutaneous at bedtime  insulin lispro (ADMELOG) corrective regimen sliding scale   SubCutaneous three times a day before meals  povidone iodine 5% Nasal Swab 1 Application(s) Both Nostrils once  senna 2 Tablet(s) Oral at bedtime  Sucroferric Oxyhydroxide 2500mg Tablet 2 Tablet(s) 2 Tablet(s) Oral three times a day    MEDICATIONS  (PRN):  dextrose Oral Gel 15 Gram(s) Oral once PRN Blood Glucose LESS THAN 70 milliGRAM(s)/deciliter  oxyCODONE    IR 5 milliGRAM(s) Oral every 4 hours PRN Moderate Pain (4 - 6)  oxyCODONE    IR 10 milliGRAM(s) Oral every 4 hours PRN Severe Pain (7 - 10)      VITALS:  T(F): 98.1 (04-04-23 @ 09:58), Max: 98.3 (04-04-23 @ 06:00)  HR: 62 (04-04-23 @ 09:58) (62 - 68)  BP: 120/68 (04-04-23 @ 09:58) (120/68 - 154/79)  RR: 18 (04-04-23 @ 09:58) (17 - 18)  SpO2: 100% (04-04-23 @ 09:58)  Wt(kg): --      PHYSICAL EXAM:  GENERAL: NAD, well-developed  EYES: conjunctiva and sclera clear  CHEST/LUNG: Clear to auscultation bilaterally; No wheeze  HEART: Regular rate and rhythm; No murmurs, rubs, or gallops  ABDOMEN: Soft, Nontender, Nondistended; Bowel sounds present  EXTREMITIES:  s/p bilateral BKA; R forearm ACE wrapped, hand wound c/d/i   PSYCH: AAOx3    LABS:              9.2                  138  | 25   | 42           5.99  >-----------< 334     ------------------------< 212                   30.2                 4.3  | 96   | 5.72                                         Ca 8.9   Mg x     Ph x            INR: 1.21 ratio<H>;    PT: 14.1 sec<H>;    PTT: 32.2 sec    [x] Care Discussed with Consultants/Other Providers: Orthopedic PA - discussed

## 2023-04-04 NOTE — PROGRESS NOTE ADULT - PROBLEM SELECTOR PLAN 3
Patient says he normal has low BPs  - Norvasc 10mg qdaily started on 3/31 and now BPs improved.   - F/U nephrology recs

## 2023-04-04 NOTE — BRIEF OPERATIVE NOTE - OPERATION/FINDINGS
Left hand and forearm irrigation and debridement, integra placement, VAC application
Right hand and forearm irrigation and debridement  Covered with xeroform, telfa, 4x4, abd pads, gayle, ACE wrap, wet to dry dressing over open wounds and finger tips
Healing right forearm forearm wound now s/p irrigation and debridement with revision closure and placement of Integra skin graft

## 2023-04-04 NOTE — PROGRESS NOTE ADULT - ASSESSMENT
This is a 46yo Egyptian speaking male with PMH of DM2, b/l BKA, ESRD (on HD MWF) well known to Orthopedics for treatment of right third finger 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 Sin/Dr. Singh) who was seen by Dr. Sin today in the office and sent in to [facility] for urgent irrigation and debridement. Patient states he missed dialysis today because he was instructed to come straight to the ED.     PAST MEDICAL & SURGICAL HISTORY:  ESRD on dialysis  H/O hypotension  Diabetes mellitus  S/P BKA (below knee amputation) bilateral  AV fistula (06 Mar 2023 17:32)      BP monitoring,continue current antihypertensive meds, low salt diet,followup with PMD in 1-2 weeks        esrd on hd , will plan for hd as order    Excess fluids and waste products will be removed from your blood; your electrolytes will be balanced; your blood pressure will be controlled.      ANEMIA PLAN:  Anemia of chronic disease:  We will continue epo aiming for a HCT of 32-36 %.   We will monitor Iron stores, B12 and RBC folate .      send blood and urine cx,serial lactate levels,monitor vitals closley,ivfs hydration,monitor urine output and renal profile,iv abx

## 2023-04-04 NOTE — PROGRESS NOTE ADULT - PROBLEM SELECTOR PLAN 6
-diabetic retinopathy.  severe proliferative diabetic retinopathy, previously on cosopt, ophtho outpt follow up   -UwT9i=9.5%  -c/w lantus 4 and ss  -c/w ASA daily  -c/w Atorvastatin 40 mg daily (started 3/31)

## 2023-04-04 NOTE — PROGRESS NOTE ADULT - SUBJECTIVE AND OBJECTIVE BOX
Orthopedics Post-Op Check  Patient was seen and examined at bedside. Denies CP/SOB/N/V/HA. Resting comfortably without complaints s/p irrigation and debridement with revision closure and placement of Integra skin graft. Pain is controlled.    Vital Signs Last 24 Hrs  T(C): 36.6 (04 Apr 2023 21:00), Max: 36.8 (04 Apr 2023 06:00)  T(F): 97.8 (04 Apr 2023 21:00), Max: 98.3 (04 Apr 2023 06:00)  HR: 65 (04 Apr 2023 21:00) (58 - 68)  BP: 142/72 (04 Apr 2023 21:00) (120/68 - 149/68)  BP(mean): 90 (04 Apr 2023 21:00) (80 - 90)  RR: 12 (04 Apr 2023 21:00) (12 - 18)  SpO2: 100% (04 Apr 2023 21:00) (99% - 100%)    Parameters below as of 04 Apr 2023 21:00  Patient On (Oxygen Delivery Method): room air    Labs:                        9.8    5.53  )-----------( 314      ( 04 Apr 2023 20:05 )             33.0     04-04    136  |  96<L>  |  48<H>  ----------------------------<  112<H>  4.6   |  21<L>  |  6.96<H>    Ca    8.6      04 Apr 2023 20:05    Physical Exam:  R UE:    Dressing clean/dry/intact. Bolster dressing with ACE wrap.   Sensation intact.   Extremity warm.

## 2023-04-04 NOTE — PROGRESS NOTE ADULT - SUBJECTIVE AND OBJECTIVE BOX
ORTHOPAEDICS DAILY PROGRESS NOTE:       SUBJECTIVE/ROS: Seen and examined. Pain well controlled. Has been working with Pt. Denies CP/SOB/N/V. NPO for OR today         MEDICATIONS  (STANDING):  acetaminophen     Tablet .. 975 milliGRAM(s) Oral every 8 hours  amLODIPine   Tablet 10 milliGRAM(s) Oral daily  ampicillin/sulbactam  IVPB 3 Gram(s) IV Intermittent every 24 hours  ampicillin/sulbactam  IVPB      AQUAPHOR (petrolatum Ointment) 1 Application(s) Topical two times a day  aspirin  chewable 81 milliGRAM(s) Oral daily  atorvastatin 40 milliGRAM(s) Oral at bedtime  collagenase Ointment 1 Application(s) Topical two times a day  dextrose 5% + sodium chloride 0.45%. 1000 milliLiter(s) (75 mL/Hr) IV Continuous <Continuous>  dextrose 5%. 1000 milliLiter(s) (50 mL/Hr) IV Continuous <Continuous>  dextrose 5%. 1000 milliLiter(s) (100 mL/Hr) IV Continuous <Continuous>  dextrose 50% Injectable 25 Gram(s) IV Push once  dextrose 50% Injectable 12.5 Gram(s) IV Push once  dextrose 50% Injectable 25 Gram(s) IV Push once  dorzolamide 2%/timolol 0.5% Ophthalmic Solution 1 Drop(s) Both EYES two times a day  epoetin deidre-epbx (RETACRIT) Injectable 71350 Unit(s) IV Push <User Schedule>  glucagon  Injectable 1 milliGRAM(s) IntraMuscular once  insulin glargine Injectable (LANTUS) 4 Unit(s) SubCutaneous at bedtime  insulin lispro (ADMELOG) corrective regimen sliding scale   SubCutaneous at bedtime  insulin lispro (ADMELOG) corrective regimen sliding scale   SubCutaneous three times a day before meals  senna 2 Tablet(s) Oral at bedtime  Sucroferric Oxyhydroxide 2500mg Tablet 2 Tablet(s) 2 Tablet(s) Oral three times a day    MEDICATIONS  (PRN):  dextrose Oral Gel 15 Gram(s) Oral once PRN Blood Glucose LESS THAN 70 milliGRAM(s)/deciliter  oxyCODONE    IR 5 milliGRAM(s) Oral every 4 hours PRN Moderate Pain (4 - 6)  oxyCODONE    IR 10 milliGRAM(s) Oral every 4 hours PRN Severe Pain (7 - 10)      OBJECTIVE:    Vital Signs Last 24 Hrs  T(C): 36.7 (03 Apr 2023 22:00), Max: 36.7 (03 Apr 2023 09:33)  T(F): 98.1 (03 Apr 2023 22:00), Max: 98.1 (03 Apr 2023 09:33)  HR: 65 (03 Apr 2023 22:00) (63 - 68)  BP: 149/68 (03 Apr 2023 22:00) (109/- - 154/79)  BP(mean): --  RR: 17 (03 Apr 2023 22:00) (17 - 18)  SpO2: 99% (03 Apr 2023 22:00) (99% - 99%)    Parameters below as of 03 Apr 2023 22:00  Patient On (Oxygen Delivery Method): room air        I&O's Detail    03 Apr 2023 07:01  -  04 Apr 2023 06:01  --------------------------------------------------------  IN:    Other (mL): 400 mL  Total IN: 400 mL    OUT:    Other (mL): 2900 mL  Total OUT: 2900 mL    Total NET: -2500 mL          Daily     Daily     LABS:                        9.2    5.99  )-----------( 334      ( 04 Apr 2023 01:10 )             30.2     04-04    138  |  96<L>  |  42<H>  ----------------------------<  212<H>  4.3   |  25  |  5.72<H>    Ca    8.9      04 Apr 2023 01:10      PT/INR - ( 04 Apr 2023 01:10 )   PT: 14.1 sec;   INR: 1.21 ratio         PTT - ( 04 Apr 2023 01:10 )  PTT:32.2 sec              PHYSICAL EXAM:  nad  nonlabored resp  rue  Anterior wound volar forearm otherwise skin intact with dry skin       minimal eschar over digits       WWP

## 2023-04-04 NOTE — PRE-OP CHECKLIST - COMMENTS
took Amlodipine at 0556,  Tylenol at 1013,  and Oxycodone at 1427 with sips of water took Amlodipine at 0556,  Tylenol at 1013, ASA 1233, and Oxycodone at 1427 with sips of water

## 2023-04-04 NOTE — PROGRESS NOTE ADULT - PROBLEM SELECTOR PLAN 4
-likely secondary to post-op changes; pt hemodynamically stable; Hemoglobin has fluctuated throughout hospitalization, currently stable

## 2023-04-04 NOTE — BRIEF OPERATIVE NOTE - NSICDXBRIEFPOSTOP_GEN_ALL_CORE_FT
POST-OP DIAGNOSIS:  Infection of right hand 07-Mar-2023 00:07:58  Jesus Oh  
POST-OP DIAGNOSIS:  Infection of right hand 07-Mar-2023 00:07:58  Jesus Oh

## 2023-04-04 NOTE — PRE-OP CHECKLIST - NOTHING BY MOUTH SINCE
Coordination of Care  1. Have you been to the ER, urgent care clinic since your last visit? Hospitalized since your last visit? Yes When: 2/25/18  [facility]    2. Have you seen or consulted any other health care providers outside of the [address] [place] [address] since your last visit? Include any pap smears or colon screening. No    Does the patient need refills?  YES    Learning Assessment Complete? yes    Results for orders placed or performed in visit on 02/26/18   AMB POC URINALYSIS DIP STICK MANUAL W/O MICRO   Result Value Ref Range    Color (UA POC) Yellow     Clarity (UA POC) Clear     Glucose (UA POC) 2+ Negative    Bilirubin (UA POC) Negative Negative    Ketones (UA POC) Negative Negative    Specific gravity (UA POC) 1.020 1.001 - 1.035    Blood (UA POC) Negative Negative    pH (UA POC) 6 4.6 - 8.0    Protein (UA POC) Negative Negative    Urobilinogen (UA POC) 0.2 mg/dL 0.2 - 1    Nitrites (UA POC) Negative Negative    Leukocyte esterase (UA POC) Negative Negative 04-Apr-2023 00:00 03-Apr-2023 19:00

## 2023-04-04 NOTE — PRE-OP CHECKLIST - PATIENT'S PERSONAL PROPERTY REMOVED
prosthesis has bilateral prosthetics on unit/prosthesis BL BKA-has bilateral prosthetics on unit/prosthesis

## 2023-04-04 NOTE — PROGRESS NOTE ADULT - ASSESSMENT
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

## 2023-04-04 NOTE — PRE-OP CHECKLIST - 3.
pre procedure chlorhexidine wipes and povidone iodine nasal swab administered on unit. Pre procedure chlorhexidine wipes and povidone iodine nasal swab administered on unit.

## 2023-04-04 NOTE — PROGRESS NOTE ADULT - SUBJECTIVE AND OBJECTIVE BOX
Patient is a 47y Male whom presented to the hospital with esrd on hd    PAST MEDICAL & SURGICAL HISTORY:  ESRD on dialysis      H/O hypotension      Diabetes mellitus      S/P BKA (below knee amputation) bilateral      AV fistula          MEDICATIONS  (STANDING):  acetaminophen     Tablet .. 975 milliGRAM(s) Oral every 8 hours  ampicillin/sulbactam  IVPB      aspirin  chewable 81 milliGRAM(s) Oral daily  dextrose 5%. 1000 milliLiter(s) (50 mL/Hr) IV Continuous <Continuous>  dextrose 5%. 1000 milliLiter(s) (100 mL/Hr) IV Continuous <Continuous>  dextrose 50% Injectable 25 Gram(s) IV Push once  dextrose 50% Injectable 12.5 Gram(s) IV Push once  dextrose 50% Injectable 25 Gram(s) IV Push once  glucagon  Injectable 1 milliGRAM(s) IntraMuscular once  insulin lispro (ADMELOG) corrective regimen sliding scale   SubCutaneous three times a day before meals  lactated ringers. 1000 milliLiter(s) (100 mL/Hr) IV Continuous <Continuous>      Allergies    No Known Drug Allergies  [place] (Rash)    Intolerances        SOCIAL HISTORY:  Denies ETOh,Smoking,     FAMILY HISTORY:  No pertinent family history in first degree relatives        REVIEW OF SYSTEMS:    CONSTITUTIONAL: No weakness, fevers or chills                                                                    9.2    5.99  )-----------( 334      ( 04 Apr 2023 01:10 )             30.2       CBC Full  -  ( 04 Apr 2023 01:10 )  WBC Count : 5.99 K/uL  RBC Count : 3.47 M/uL  Hemoglobin : 9.2 g/dL  Hematocrit : 30.2 %  Platelet Count - Automated : 334 K/uL  Mean Cell Volume : 87.0 fL  Mean Cell Hemoglobin : 26.5 pg  Mean Cell Hemoglobin Concentration : 30.5 gm/dL  Auto Neutrophil # : x  Auto Lymphocyte # : x  Auto Monocyte # : x  Auto Eosinophil # : x  Auto Basophil # : x  Auto Neutrophil % : x  Auto Lymphocyte % : x  Auto Monocyte % : x  Auto Eosinophil % : x  Auto Basophil % : x      04-04    138  |  96<L>  |  42<H>  ----------------------------<  212<H>  4.3   |  25  |  5.72<H>    Ca    8.9      04 Apr 2023 01:10        CAPILLARY BLOOD GLUCOSE      POCT Blood Glucose.: 155 mg/dL (04 Apr 2023 15:25)  POCT Blood Glucose.: 133 mg/dL (04 Apr 2023 12:12)  POCT Blood Glucose.: 243 mg/dL (04 Apr 2023 07:08)  POCT Blood Glucose.: 206 mg/dL (03 Apr 2023 22:06)      Vital Signs Last 24 Hrs  T(C): 36.4 (04 Apr 2023 15:34), Max: 36.8 (04 Apr 2023 06:00)  T(F): 97.6 (04 Apr 2023 15:45), Max: 98.3 (04 Apr 2023 06:00)  HR: 58 (04 Apr 2023 15:45) (58 - 68)  BP: 128/63 (04 Apr 2023 15:45) (120/68 - 149/68)  BP(mean): --  RR: 17 (04 Apr 2023 15:34) (17 - 18)  SpO2: 99% (04 Apr 2023 15:45) (99% - 100%)    Parameters below as of 04 Apr 2023 15:34  Patient On (Oxygen Delivery Method): room air            PT/INR - ( 04 Apr 2023 01:10 )   PT: 14.1 sec;   INR: 1.21 ratio         PTT - ( 04 Apr 2023 01:10 )  PTT:32.2 sec        PHYSICAL EXAM:    Constitutional: NAD  HEENT: conjunctive   clear   Neck:  No JVD  Respiratory: CTAB  Cardiovascular: S1 and S2  Gastrointestinal: BS+, soft, NT/ND  Extremities: No peripheral edema ,  Prior right 3rd digit amputation.S/P BKA (below knee amputation) bilateral  Access: pos fistula

## 2023-04-04 NOTE — PROGRESS NOTE ADULT - ASSESSMENT
A/P: 47y y/o Male s/p irrigation and debridement with revision closure and placement of Integra skin graft, POD #0   -Pain control/analgesia  -DVT ppx - Venodynes/Aspirin   -PT/OT  -Dermatology consult in AM   -Dressing to stay in place until POD5  -Notify orthopedics with any questions

## 2023-04-04 NOTE — BRIEF OPERATIVE NOTE - NSICDXBRIEFPREOP_GEN_ALL_CORE_FT
PRE-OP DIAGNOSIS:  Infection of right hand 07-Mar-2023 00:08:00  Jesus Oh  
PRE-OP DIAGNOSIS:  Infection of right hand 07-Mar-2023 00:08:00  Jesus Oh

## 2023-04-04 NOTE — PROGRESS NOTE ADULT - PROBLEM SELECTOR PLAN 5
-renal consulted for HD - MWF  -renally dose medications   -anemia of renal disease, c/w epo during HD as per renal  -renal restricted diet w/ nepro supplements    Note- patient has appointment at [place] for renal transplant evaluation on 4/20 and requesting discharge by 4/14 to facilitate getting to this appointment - ortho team aware

## 2023-04-04 NOTE — PRE-OP CHECKLIST - SELECT TESTS ORDERED
155/BMP/CBC/PT/PTT/INR/Type and Cross/POCT Blood Glucose/COVID-19 155 at 1525/BMP/CBC/PT/PTT/INR/Type and Cross/POCT Blood Glucose/COVID-19

## 2023-04-05 DIAGNOSIS — R21 RASH AND OTHER NONSPECIFIC SKIN ERUPTION: ICD-10-CM

## 2023-04-05 LAB
GLUCOSE BLDC GLUCOMTR-MCNC: 116 MG/DL — HIGH (ref 70–99)
GLUCOSE BLDC GLUCOMTR-MCNC: 121 MG/DL — HIGH (ref 70–99)
GLUCOSE BLDC GLUCOMTR-MCNC: 140 MG/DL — HIGH (ref 70–99)
GLUCOSE BLDC GLUCOMTR-MCNC: 179 MG/DL — HIGH (ref 70–99)

## 2023-04-05 PROCEDURE — 99232 SBSQ HOSP IP/OBS MODERATE 35: CPT

## 2023-04-05 PROCEDURE — 99221 1ST HOSP IP/OBS SF/LOW 40: CPT

## 2023-04-05 RX ORDER — PETROLATUM,WHITE
1 JELLY (GRAM) TOPICAL ONCE
Refills: 0 | Status: COMPLETED | OUTPATIENT
Start: 2023-04-05 | End: 2023-04-06

## 2023-04-05 RX ADMIN — OXYCODONE HYDROCHLORIDE 10 MILLIGRAM(S): 5 TABLET ORAL at 01:46

## 2023-04-05 RX ADMIN — OXYCODONE HYDROCHLORIDE 10 MILLIGRAM(S): 5 TABLET ORAL at 21:47

## 2023-04-05 RX ADMIN — OXYCODONE HYDROCHLORIDE 10 MILLIGRAM(S): 5 TABLET ORAL at 22:47

## 2023-04-05 RX ADMIN — AMPICILLIN SODIUM AND SULBACTAM SODIUM 200 GRAM(S): 250; 125 INJECTION, POWDER, FOR SUSPENSION INTRAMUSCULAR; INTRAVENOUS at 21:49

## 2023-04-05 RX ADMIN — OXYCODONE HYDROCHLORIDE 10 MILLIGRAM(S): 5 TABLET ORAL at 06:37

## 2023-04-05 RX ADMIN — Medication 975 MILLIGRAM(S): at 07:08

## 2023-04-05 RX ADMIN — DORZOLAMIDE HYDROCHLORIDE TIMOLOL MALEATE 1 DROP(S): 20; 5 SOLUTION/ DROPS OPHTHALMIC at 19:42

## 2023-04-05 RX ADMIN — Medication 975 MILLIGRAM(S): at 06:08

## 2023-04-05 RX ADMIN — DORZOLAMIDE HYDROCHLORIDE TIMOLOL MALEATE 1 DROP(S): 20; 5 SOLUTION/ DROPS OPHTHALMIC at 09:29

## 2023-04-05 RX ADMIN — Medication 975 MILLIGRAM(S): at 13:48

## 2023-04-05 RX ADMIN — OXYCODONE HYDROCHLORIDE 10 MILLIGRAM(S): 5 TABLET ORAL at 14:48

## 2023-04-05 RX ADMIN — ATORVASTATIN CALCIUM 40 MILLIGRAM(S): 80 TABLET, FILM COATED ORAL at 21:47

## 2023-04-05 RX ADMIN — INSULIN GLARGINE 4 UNIT(S): 100 INJECTION, SOLUTION SUBCUTANEOUS at 23:04

## 2023-04-05 RX ADMIN — Medication 975 MILLIGRAM(S): at 14:48

## 2023-04-05 RX ADMIN — AMLODIPINE BESYLATE 10 MILLIGRAM(S): 2.5 TABLET ORAL at 06:12

## 2023-04-05 RX ADMIN — Medication 975 MILLIGRAM(S): at 22:47

## 2023-04-05 RX ADMIN — OXYCODONE HYDROCHLORIDE 10 MILLIGRAM(S): 5 TABLET ORAL at 02:46

## 2023-04-05 RX ADMIN — Medication 81 MILLIGRAM(S): at 11:53

## 2023-04-05 RX ADMIN — Medication 975 MILLIGRAM(S): at 21:48

## 2023-04-05 RX ADMIN — Medication 2: at 16:53

## 2023-04-05 RX ADMIN — ERYTHROPOIETIN 10000 UNIT(S): 10000 INJECTION, SOLUTION INTRAVENOUS; SUBCUTANEOUS at 18:19

## 2023-04-05 RX ADMIN — OXYCODONE HYDROCHLORIDE 10 MILLIGRAM(S): 5 TABLET ORAL at 13:48

## 2023-04-05 RX ADMIN — OXYCODONE HYDROCHLORIDE 10 MILLIGRAM(S): 5 TABLET ORAL at 06:07

## 2023-04-05 NOTE — CONSULT NOTE ADULT - ASSESSMENT
Favor irritant vs contact dermatitis     -	[place] use of aquaphor for now   -	If symptomatic with pruritus, can start triamcinolone 0.1% ointment BID x 2 weeks     The patient's chart was reviewed in addition to being seen and examined at bedside with the dermatology attending Dr. Aj. Recommendations were communicated with the primary team.  Please page [phone] w/10 digit call back number for further related questions.    Avril Medley MD  Resident Physician, PGY3  [facility]  Pager: [phone]  Office: [phone]     Favor contact dermatitis   -	[place] use of aquaphor for now   -	If symptomatic with pruritus, can start triamcinolone 0.1% ointment BID x 2 weeks     The patient's chart was reviewed in addition to being seen and examined at bedside with the dermatology attending Dr. Aj. Recommendations were communicated with the primary team.  Please page [phone] w/10 digit call back number for further related questions.    Avril Medley MD  Resident Physician, PGY3  [facility]at[facility]  Pager: [phone]  Office: [phone]

## 2023-04-05 NOTE — PHYSICAL THERAPY INITIAL EVALUATION ADULT - PERTINENT HX OF CURRENT PROBLEM, REHAB EVAL
47 year old male status post multiple I&D of Right hand and forearm. Stable status post  Irrigation and debridement and Integra placement on 4/4

## 2023-04-05 NOTE — PROGRESS NOTE ADULT - ASSESSMENT
This is a 46yo [place] speaking male with PMH of DM2, b/l BKA, ESRD (on HD MWF) well known to Orthopedics for treatment of right third finger 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 Sin/Dr. Singh) who was seen by Dr. Sin today in the office and sent in to [facility] for urgent irrigation and debridement. Patient states he missed dialysis today because he was instructed to come straight to the ED.     PAST MEDICAL & SURGICAL HISTORY:  ESRD on dialysis  H/O hypotension  Diabetes mellitus  S/P BKA (below knee amputation) bilateral  AV fistula (06 Mar 2023 17:32)      BP monitoring,continue current antihypertensive meds, low salt diet,followup with PMD in 1-2 weeks        esrd on hd , will plan for hd as order    Excess fluids and waste products will be removed from your blood; your electrolytes will be balanced; your blood pressure will be controlled.      ANEMIA PLAN:  Anemia of chronic disease:  We will continue epo aiming for a HCT of 32-36 %.   We will monitor Iron stores, B12 and RBC folate .      send blood and urine cx,serial lactate levels,monitor vitals closley,ivfs hydration,monitor urine output and renal profile,iv abx

## 2023-04-05 NOTE — PROGRESS NOTE ADULT - ASSESSMENT
48 yo M sp multiple I&D of Right hand and forearm. Stable s/p  I&D and Integra placement on 4/4    Plan:  - Pain contro;  - DVT ppx  - IS  - Con't Unasyn until 4/17  - Monitor BP  - Dialysis MWF with labs at dialysis  - Consult derm for rash    For all questions related to patient care, please reach out to the on-call team via the pager.     Renee Pope, PGY 1  Orthopaedic Surgery  [facility] [id number]  [facility] [id number]  [facility] [phone]

## 2023-04-05 NOTE — PROGRESS NOTE ADULT - PROBLEM SELECTOR PLAN 2
Pictures of rash seen - erythematous, well demarcated area extending past intregra graft  - Suspect contact dermatitis   - Derm to see, will follow-up recs

## 2023-04-05 NOTE — PHYSICAL THERAPY INITIAL EVALUATION ADULT - NSPTDISCHREC_GEN_A_CORE
Patient stating he does not need physical therapy and does not want to be seen.  Aziza BELTRAN aware, patient is independent and not rehab candidate nor therapy candidate./No skilled PT needs

## 2023-04-05 NOTE — PROGRESS NOTE ADULT - SUBJECTIVE AND OBJECTIVE BOX
ANESTHESIA POSTOP CHECK    47y Male POSTOP DAY 1 S/P     Vital Signs Last 24 Hrs  T(C): 36.3 (05 Apr 2023 09:51), Max: 36.6 (04 Apr 2023 21:00)  T(F): 97.3 (05 Apr 2023 09:51), Max: 97.9 (05 Apr 2023 01:47)  HR: 65 (05 Apr 2023 09:51) (58 - 71)  BP: 143/64 (05 Apr 2023 09:51) (128/63 - 164/73)  BP(mean): 90 (04 Apr 2023 21:00) (80 - 90)  RR: 17 (05 Apr 2023 09:51) (12 - 18)  SpO2: 100% (05 Apr 2023 09:51) (99% - 100%)    Parameters below as of 05 Apr 2023 09:51  Patient On (Oxygen Delivery Method): room air      I&O's Summary    04 Apr 2023 07:01  -  05 Apr 2023 07:00  --------------------------------------------------------  IN: 100 mL / OUT: 200 mL / NET: -100 mL        [ x] NO APPARENT ANESTHESIA COMPLICATIONS

## 2023-04-05 NOTE — PROGRESS NOTE ADULT - PROBLEM SELECTOR PLAN 5
-renal consulted for HD - MWF  -renally dose medications   -anemia of renal disease, c/w epo during HD as per renal  -renal restricted diet w/ nepro supplements    Note- patient has appointment at [place] for renal transplant evaluation on 4/20 and requesting discharge by 4/14 to facilitate getting to this appointment - ortho team aware

## 2023-04-05 NOTE — PROGRESS NOTE ADULT - PROBLEM SELECTOR PLAN 6
-diabetic retinopathy.  severe proliferative diabetic retinopathy, previously on cosopt, ophtho outpt follow up   -PwK5w=8.5%  -c/w lantus 4 and ss  -c/w ASA daily  -c/w Atorvastatin 40 mg daily (started 3/31)

## 2023-04-05 NOTE — PHYSICAL THERAPY INITIAL EVALUATION ADULT - LEVEL OF INDEPENDENCE: GAIT, REHAB EVAL
Patient refused stating, he can walk and does not need to ambulate right now. Per  and nursing staff patient is able to get up and walk without assist.

## 2023-04-05 NOTE — PHYSICAL THERAPY INITIAL EVALUATION ADULT - ADDITIONAL COMMENTS
Patient told me it is none of my business his prior level of function. See care coordination note for further details.

## 2023-04-05 NOTE — PROGRESS NOTE ADULT - SUBJECTIVE AND OBJECTIVE BOX
Overnight events: None    SUBJECTIVE: Pt seen and examined at bedside. Pt states he is comfortable and pain is well controlled. No acute complaints this AM.      OBJECTIVE:  Vital Signs Last 24 Hrs  T(C): 36.3 (05 Apr 2023 06:00), Max: 36.7 (04 Apr 2023 09:58)  T(F): 97.4 (05 Apr 2023 06:00), Max: 98.1 (04 Apr 2023 09:58)  HR: 67 (05 Apr 2023 06:00) (58 - 71)  BP: 149/76 (05 Apr 2023 06:00) (120/68 - 164/73)  BP(mean): 90 (04 Apr 2023 21:00) (80 - 90)  RR: 17 (05 Apr 2023 06:00) (12 - 18)  SpO2: 99% (05 Apr 2023 06:00) (99% - 100%)    Parameters below as of 05 Apr 2023 06:00  Patient On (Oxygen Delivery Method): room air      04-03-23 @ 07:01  -  04-04-23 @ 07:00  --------------------------------------------------------  IN: 400 mL / OUT: 2900 mL / NET: -2500 mL    04-04-23 @ 07:01  -  04-05-23 @ 06:25  --------------------------------------------------------  IN: 100 mL / OUT: 200 mL / NET: -100 mL        Physical Examination:  GEN: NAD, resting quietly  PULM: symmetric chest rise bilaterally, no increased WOB  ABD: nondistended  EXTR:   LUE: dressing c/d/i, + gross motor, SILT, WWP      LABS:                        9.8    5.53  )-----------( 314      ( 04 Apr 2023 20:05 )             33.0       04-04    136  |  96<L>  |  48<H>  ----------------------------<  112<H>  4.6   |  21<L>  |  6.96<H>    Ca    8.6      04 Apr 2023 20:05

## 2023-04-05 NOTE — CONSULT NOTE ADULT - ATTENDING COMMENTS
Contact Dermatitis  no sings of infection
Pt with h/o steal s/p R arm proximalization of inflow no admitted for R forearm wound debridement  pt denies any rest pain (resolved since proximalization)  the finger amp site is healed  the forearm is healing well  no indication for further HD access revision at this time
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 and multiple OR debridement with eventual closure, cultures with Ecoli pansensitive,  Strep angionosis and Bacteriodes fragilis, complicated Unasyn 6 week course while inpatient, now sent back by ortho for debridement as the hand was gangrenous and infected as per ortho afebrile, WBC normal, ESR: 44, CRP 23  xray  with areas of bone loss  s/p debridement 3/7 and no cultures sent  also COVID positive and CXR clear    ESRD with recent steal syndrome and R 3rd finger and forearm gas gangrene s/p amp and multiple debridement, completed a 6 week course of unasyn but after completing sent back for infected hand  s/p OR debridement 3/7  Asymptomatic COVID CXR clear    * f/u the blood cx  * check MRSA PCR  * no culture was sent from OR (there is no order either) please follow up if there was any culture sent)  * c/w unasyn and vanco 500 post HD for now  * hand and forearm MRI    The above assessment and plan was discussed with the primary team    Tori Chamorro MD  contact on teams  After 5pm and on weekends call [phone]

## 2023-04-05 NOTE — CONSULT NOTE ADULT - REASON FOR ADMISSION
Right forearm gas gangrene

## 2023-04-05 NOTE — PROGRESS NOTE ADULT - SUBJECTIVE AND OBJECTIVE BOX
Patient is a 47y Male whom presented to the hospital with esrd on hd    PAST MEDICAL & SURGICAL HISTORY:  ESRD on dialysis      H/O hypotension      Diabetes mellitus      S/P BKA (below knee amputation) bilateral      AV fistula          MEDICATIONS  (STANDING):  acetaminophen     Tablet .. 975 milliGRAM(s) Oral every 8 hours  ampicillin/sulbactam  IVPB      aspirin  chewable 81 milliGRAM(s) Oral daily  dextrose 5%. 1000 milliLiter(s) (50 mL/Hr) IV Continuous <Continuous>  dextrose 5%. 1000 milliLiter(s) (100 mL/Hr) IV Continuous <Continuous>  dextrose 50% Injectable 25 Gram(s) IV Push once  dextrose 50% Injectable 12.5 Gram(s) IV Push once  dextrose 50% Injectable 25 Gram(s) IV Push once  glucagon  Injectable 1 milliGRAM(s) IntraMuscular once  insulin lispro (ADMELOG) corrective regimen sliding scale   SubCutaneous three times a day before meals  lactated ringers. 1000 milliLiter(s) (100 mL/Hr) IV Continuous <Continuous>      Allergies    No Known Drug Allergies  [place] (Rash)    Intolerances        SOCIAL HISTORY:  Denies ETOh,Smoking,     FAMILY HISTORY:  No pertinent family history in first degree relatives        REVIEW OF SYSTEMS:    CONSTITUTIONAL: No weakness, fevers or chills                                                              9.8    5.53  )-----------( 314      ( 04 Apr 2023 20:05 )             33.0       CBC Full  -  ( 04 Apr 2023 20:05 )  WBC Count : 5.53 K/uL  RBC Count : 3.70 M/uL  Hemoglobin : 9.8 g/dL  Hematocrit : 33.0 %  Platelet Count - Automated : 314 K/uL  Mean Cell Volume : 89.2 fL  Mean Cell Hemoglobin : 26.5 pg  Mean Cell Hemoglobin Concentration : 29.7 gm/dL  Auto Neutrophil # : x  Auto Lymphocyte # : x  Auto Monocyte # : x  Auto Eosinophil # : x  Auto Basophil # : x  Auto Neutrophil % : x  Auto Lymphocyte % : x  Auto Monocyte % : x  Auto Eosinophil % : x  Auto Basophil % : x      04-04    136  |  96<L>  |  48<H>  ----------------------------<  112<H>  4.6   |  21<L>  |  6.96<H>    Ca    8.6      04 Apr 2023 20:05        CAPILLARY BLOOD GLUCOSE      POCT Blood Glucose.: 179 mg/dL (05 Apr 2023 16:28)  POCT Blood Glucose.: 140 mg/dL (05 Apr 2023 11:33)  POCT Blood Glucose.: 116 mg/dL (05 Apr 2023 07:11)  POCT Blood Glucose.: 108 mg/dL (04 Apr 2023 22:16)  POCT Blood Glucose.: 108 mg/dL (04 Apr 2023 19:45)      Vital Signs Last 24 Hrs  T(C): 36.8 (05 Apr 2023 16:00), Max: 36.8 (05 Apr 2023 16:00)  T(F): 98.2 (05 Apr 2023 16:00), Max: 98.2 (05 Apr 2023 16:00)  HR: 61 (05 Apr 2023 16:00) (61 - 71)  BP: 135/71 (05 Apr 2023 16:00) (131/63 - 164/73)  BP(mean): 90 (04 Apr 2023 21:00) (80 - 90)  RR: 18 (05 Apr 2023 16:00) (12 - 18)  SpO2: 100% (05 Apr 2023 16:00) (99% - 100%)    Parameters below as of 05 Apr 2023 16:00  Patient On (Oxygen Delivery Method): room air            PT/INR - ( 04 Apr 2023 01:10 )   PT: 14.1 sec;   INR: 1.21 ratio         PTT - ( 04 Apr 2023 01:10 )  PTT:32.2 sec                          PHYSICAL EXAM:    Constitutional: NAD  HEENT: conjunctive   clear   Neck:  No JVD  Respiratory: CTAB  Cardiovascular: S1 and S2  Gastrointestinal: BS+, soft, NT/ND  Extremities: No peripheral edema ,  Prior right 3rd digit amputation.S/P BKA (below knee amputation) bilateral  Access: pos fistula

## 2023-04-05 NOTE — CONSULT NOTE ADULT - SUBJECTIVE AND OBJECTIVE BOX
HPI: 46 yo Male with PMH of DM2, b/l BKA, ESRD on HD, s/p treatment of right third finger and forearm gas gangrene s/p amputation of right thrid finger and multiple irrigation and debridements of right hand//forearm, now s/p integra placement 4/4, on unasyn.     DERM: Dermatology consulted for rash noted once wound vac was removed from patient’s arm. Team feels the rash has been getting worse since the wound vac was removed about 2 weeks ago. Patient reports it is asymptomatic.       PAST MEDICAL & SURGICAL HISTORY:  ESRD on dialysis      H/O hypotension      Diabetes mellitus      S/P BKA (below knee amputation) bilateral      AV fistula          REVIEW OF SYSTEMS    General: no fevers/chills, no lethargy	    Skin/Breast: see HPI  	  Ophthalmologic: no eye pain or change in vision  	  ENMT: no dysphagia or change in hearing    Respiratory and Thorax: no SOB or cough  	  Cardiovascular: no palpitations or chest pain    Gastrointestinal: no abdominal pain or blood in stool     Genitourinary: no dysuria or frequency    Musculoskeletal: no joint pains or weakness	    Neurological: no weakness, numbness, or tingling      MEDICATIONS  (STANDING):  acetaminophen     Tablet .. 975 milliGRAM(s) Oral every 8 hours  amLODIPine   Tablet 10 milliGRAM(s) Oral daily  ampicillin/sulbactam  IVPB      ampicillin/sulbactam  IVPB 3 Gram(s) IV Intermittent every 24 hours  AQUAPHOR (petrolatum Ointment) 1 Application(s) Topical two times a day  aspirin  chewable 81 milliGRAM(s) Oral daily  atorvastatin 40 milliGRAM(s) Oral at bedtime  collagenase Ointment 1 Application(s) Topical two times a day  dextrose 5%. 1000 milliLiter(s) (50 mL/Hr) IV Continuous <Continuous>  dextrose 5%. 1000 milliLiter(s) (100 mL/Hr) IV Continuous <Continuous>  dextrose 50% Injectable 25 Gram(s) IV Push once  dextrose 50% Injectable 12.5 Gram(s) IV Push once  dextrose 50% Injectable 25 Gram(s) IV Push once  dorzolamide 2%/timolol 0.5% Ophthalmic Solution 1 Drop(s) Both EYES two times a day  epoetin deidre-epbx (RETACRIT) Injectable 24980 Unit(s) IV Push <User Schedule>  glucagon  Injectable 1 milliGRAM(s) IntraMuscular once  insulin glargine Injectable (LANTUS) 4 Unit(s) SubCutaneous at bedtime  insulin lispro (ADMELOG) corrective regimen sliding scale   SubCutaneous at bedtime  insulin lispro (ADMELOG) corrective regimen sliding scale   SubCutaneous three times a day before meals  senna 2 Tablet(s) Oral at bedtime  Sucroferric Oxyhydroxide 2500mg Tablet 2 Tablet(s) 2 Tablet(s) Oral three times a day    MEDICATIONS  (PRN):  dextrose Oral Gel 15 Gram(s) Oral once PRN Blood Glucose LESS THAN 70 milliGRAM(s)/deciliter  oxyCODONE    IR 5 milliGRAM(s) Oral every 4 hours PRN Moderate Pain (4 - 6)  oxyCODONE    IR 10 milliGRAM(s) Oral every 4 hours PRN Severe Pain (7 - 10)      Allergies    No Known Drug Allergies  [place] (Rash)    Intolerances        SOCIAL HISTORY: currently working     FAMILY HISTORY:  No pertinent family history in first degree relatives        Vital Signs Last 24 Hrs  T(C): 36.2 (05 Apr 2023 14:00), Max: 36.6 (04 Apr 2023 21:00)  T(F): 97.2 (05 Apr 2023 14:00), Max: 97.9 (05 Apr 2023 01:47)  HR: 63 (05 Apr 2023 14:00) (58 - 71)  BP: 145/55 (05 Apr 2023 14:00) (128/63 - 164/73)  BP(mean): 90 (04 Apr 2023 21:00) (80 - 90)  RR: 18 (05 Apr 2023 14:00) (12 - 18)  SpO2: 100% (05 Apr 2023 14:00) (99% - 100%)    Parameters below as of 05 Apr 2023 14:00  Patient On (Oxygen Delivery Method): room air        PHYSICAL EXAM:    General: Well appearing, well nourished, in no apparent distress  HEENT: Normocephalic, thyroid not visibly enlarged, conjunctiva not injected, oropharynx clear without ulcerations  CV: BL BKA   Resp: Non labored breathing, no clubbing of extremities  GI: Non distended abdomen, no palpable hepatosplenomegaly  Lymph: No visible lymphadenopathy  Neuro: Alert and oriented x3  Skin: The scalp/hair, head/face, conjunctivae/lids, lips/teeth/gums, neck, digits/nails, right and left axilla, right and left upper and lower extremities, chest, abdomen, back, buttocks and groin area. No bromhidrosis or hyperhidrosis.    Of note on skin exam:    well demarcated erythematous thin plaque with scale of the R arm surrounding wound site     LABS:                        9.8    5.53  )-----------( 314      ( 04 Apr 2023 20:05 )             33.0     04-04    136  |  96<L>  |  48<H>  ----------------------------<  112<H>  4.6   |  21<L>  |  6.96<H>    Ca    8.6      04 Apr 2023 20:05      PT/INR - ( 04 Apr 2023 01:10 )   PT: 14.1 sec;   INR: 1.21 ratio         PTT - ( 04 Apr 2023 01:10 )  PTT:32.2 sec      RADIOLOGY & ADDITIONAL STUDIES:

## 2023-04-05 NOTE — PROGRESS NOTE ADULT - SUBJECTIVE AND OBJECTIVE BOX
CHIEF COMPLAINT: f/u     SUBJECTIVE / OVERNIGHT EVENTS: Patient seen and examined, states he feels fine but that he noticed a rash under the dressing this AM. Denies pain     MEDICATIONS  (STANDING):  acetaminophen     Tablet .. 975 milliGRAM(s) Oral every 8 hours  amLODIPine   Tablet 10 milliGRAM(s) Oral daily  ampicillin/sulbactam  IVPB      ampicillin/sulbactam  IVPB 3 Gram(s) IV Intermittent every 24 hours  AQUAPHOR (petrolatum Ointment) 1 Application(s) Topical two times a day  aspirin  chewable 81 milliGRAM(s) Oral daily  atorvastatin 40 milliGRAM(s) Oral at bedtime  collagenase Ointment 1 Application(s) Topical two times a day  dextrose 5%. 1000 milliLiter(s) (50 mL/Hr) IV Continuous <Continuous>  dextrose 5%. 1000 milliLiter(s) (100 mL/Hr) IV Continuous <Continuous>  dextrose 50% Injectable 25 Gram(s) IV Push once  dextrose 50% Injectable 12.5 Gram(s) IV Push once  dextrose 50% Injectable 25 Gram(s) IV Push once  dorzolamide 2%/timolol 0.5% Ophthalmic Solution 1 Drop(s) Both EYES two times a day  epoetin deidre-epbx (RETACRIT) Injectable 23166 Unit(s) IV Push <User Schedule>  glucagon  Injectable 1 milliGRAM(s) IntraMuscular once  insulin glargine Injectable (LANTUS) 4 Unit(s) SubCutaneous at bedtime  insulin lispro (ADMELOG) corrective regimen sliding scale   SubCutaneous at bedtime  insulin lispro (ADMELOG) corrective regimen sliding scale   SubCutaneous three times a day before meals  senna 2 Tablet(s) Oral at bedtime  Sucroferric Oxyhydroxide 2500mg Tablet 2 Tablet(s) 2 Tablet(s) Oral three times a day    MEDICATIONS  (PRN):  dextrose Oral Gel 15 Gram(s) Oral once PRN Blood Glucose LESS THAN 70 milliGRAM(s)/deciliter  oxyCODONE    IR 5 milliGRAM(s) Oral every 4 hours PRN Moderate Pain (4 - 6)  oxyCODONE    IR 10 milliGRAM(s) Oral every 4 hours PRN Severe Pain (7 - 10)      VITALS:  T(F): 97.2 (04-05-23 @ 14:00), Max: 97.9 (04-05-23 @ 01:47)  HR: 63 (04-05-23 @ 14:00) (58 - 71)  BP: 145/55 (04-05-23 @ 14:00) (128/63 - 164/73)  RR: 18 (04-05-23 @ 14:00) (12 - 18)  SpO2: 100% (04-05-23 @ 14:00)  Wt(kg): --  Height (cm): 170.2 (15:34)  Weight (kg): 77.1 (15:34)  BMI (kg/m2): 26.6 (15:34)    PHYSICAL EXAM:  GENERAL: NAD, well-developed  EYES: conjunctiva and sclera clear  CHEST/LUNG: Clear to auscultation bilaterally; No wheeze  HEART: Regular rate and rhythm; No murmurs, rubs, or gallops  ABDOMEN: Soft, Nontender, Nondistended; Bowel sounds present  EXTREMITIES:  s/p bilateral BKA; R forearm ACE wrapped  PSYCH: AAOx3      LABS:              9.8                  136  | 21   | 48           5.53  >-----------< 314     ------------------------< 112                   33.0                 4.6  | 96   | 6.96                                         Ca 8.6   Mg x     Ph x            INR: 1.21 ratio<H>;    PT: 14.1 sec<H>;    PTT: 32.2 sec      [x] Care Discussed with Consultants/Other Providers: Orthopedic PA - discussed

## 2023-04-06 LAB
GLUCOSE BLDC GLUCOMTR-MCNC: 124 MG/DL — HIGH (ref 70–99)
GLUCOSE BLDC GLUCOMTR-MCNC: 131 MG/DL — HIGH (ref 70–99)
GLUCOSE BLDC GLUCOMTR-MCNC: 198 MG/DL — HIGH (ref 70–99)
GLUCOSE BLDC GLUCOMTR-MCNC: 99 MG/DL — SIGNIFICANT CHANGE UP (ref 70–99)

## 2023-04-06 PROCEDURE — 99232 SBSQ HOSP IP/OBS MODERATE 35: CPT

## 2023-04-06 RX ADMIN — OXYCODONE HYDROCHLORIDE 10 MILLIGRAM(S): 5 TABLET ORAL at 22:03

## 2023-04-06 RX ADMIN — Medication 975 MILLIGRAM(S): at 23:10

## 2023-04-06 RX ADMIN — ATORVASTATIN CALCIUM 40 MILLIGRAM(S): 80 TABLET, FILM COATED ORAL at 22:03

## 2023-04-06 RX ADMIN — Medication 975 MILLIGRAM(S): at 07:00

## 2023-04-06 RX ADMIN — Medication 975 MILLIGRAM(S): at 15:55

## 2023-04-06 RX ADMIN — OXYCODONE HYDROCHLORIDE 10 MILLIGRAM(S): 5 TABLET ORAL at 10:20

## 2023-04-06 RX ADMIN — OXYCODONE HYDROCHLORIDE 10 MILLIGRAM(S): 5 TABLET ORAL at 01:58

## 2023-04-06 RX ADMIN — OXYCODONE HYDROCHLORIDE 10 MILLIGRAM(S): 5 TABLET ORAL at 23:10

## 2023-04-06 RX ADMIN — INSULIN GLARGINE 4 UNIT(S): 100 INJECTION, SOLUTION SUBCUTANEOUS at 22:04

## 2023-04-06 RX ADMIN — Medication 1 APPLICATION(S): at 10:21

## 2023-04-06 RX ADMIN — Medication 975 MILLIGRAM(S): at 14:55

## 2023-04-06 RX ADMIN — Medication 1 APPLICATION(S): at 06:04

## 2023-04-06 RX ADMIN — AMPICILLIN SODIUM AND SULBACTAM SODIUM 200 GRAM(S): 250; 125 INJECTION, POWDER, FOR SUSPENSION INTRAMUSCULAR; INTRAVENOUS at 22:04

## 2023-04-06 RX ADMIN — Medication 2: at 16:40

## 2023-04-06 RX ADMIN — OXYCODONE HYDROCHLORIDE 10 MILLIGRAM(S): 5 TABLET ORAL at 14:55

## 2023-04-06 RX ADMIN — OXYCODONE HYDROCHLORIDE 10 MILLIGRAM(S): 5 TABLET ORAL at 15:55

## 2023-04-06 RX ADMIN — Medication 81 MILLIGRAM(S): at 12:09

## 2023-04-06 RX ADMIN — DORZOLAMIDE HYDROCHLORIDE TIMOLOL MALEATE 1 DROP(S): 20; 5 SOLUTION/ DROPS OPHTHALMIC at 06:01

## 2023-04-06 RX ADMIN — OXYCODONE HYDROCHLORIDE 10 MILLIGRAM(S): 5 TABLET ORAL at 11:20

## 2023-04-06 RX ADMIN — Medication 975 MILLIGRAM(S): at 22:03

## 2023-04-06 RX ADMIN — Medication 1 APPLICATION(S): at 14:55

## 2023-04-06 RX ADMIN — DORZOLAMIDE HYDROCHLORIDE TIMOLOL MALEATE 1 DROP(S): 20; 5 SOLUTION/ DROPS OPHTHALMIC at 17:12

## 2023-04-06 RX ADMIN — OXYCODONE HYDROCHLORIDE 10 MILLIGRAM(S): 5 TABLET ORAL at 06:00

## 2023-04-06 RX ADMIN — OXYCODONE HYDROCHLORIDE 10 MILLIGRAM(S): 5 TABLET ORAL at 07:00

## 2023-04-06 RX ADMIN — OXYCODONE HYDROCHLORIDE 10 MILLIGRAM(S): 5 TABLET ORAL at 02:58

## 2023-04-06 RX ADMIN — Medication 975 MILLIGRAM(S): at 06:00

## 2023-04-06 NOTE — PROGRESS NOTE ADULT - PROBLEM SELECTOR PLAN 5
-renal consulted for HD - MWF  -renally dose medications   -anemia of renal disease, c/w epo during HD as per renal  -renal restricted diet w/ nepro supplements    Note- patient has appointment at [facility] for renal transplant evaluation on 4/20 and requesting discharge by 4/14 to facilitate getting to this appointment - ortho team aware

## 2023-04-06 NOTE — PROGRESS NOTE ADULT - ASSESSMENT
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 Sin/Dr. Singh) who was seen by Dr. Sin today in the office and sent in to [facility] for urgent irrigation and debridement. Patient states he missed dialysis today because he was instructed to come straight to the ED.     PAST MEDICAL & SURGICAL HISTORY:  ESRD on dialysis  H/O hypotension  Diabetes mellitus  S/P BKA (below knee amputation) bilateral  AV fistula (06 Mar 2023 17:32)      BP monitoring,continue current antihypertensive meds, low salt diet,followup with PMD in 1-2 weeks        esrd on hd , will plan for hd as order    Excess fluids and waste products will be removed from your blood; your electrolytes will be balanced; your blood pressure will be controlled.      ANEMIA PLAN:  Anemia of chronic disease:  We will continue epo aiming for a HCT of 32-36 %.   We will monitor Iron stores, B12 and RBC folate .      send blood and urine cx,serial lactate levels,monitor vitals closley,ivfs hydration,monitor urine output and renal profile,iv abx

## 2023-04-06 NOTE — ADVANCED PRACTICE NURSE CONSULT - ASSESSMENT
Left arm cleansed with CHG. Under ultrasound guidance, placed Arrow Endurance Extended Dwell Peripheral Catheter System 20G / 6cm into Left Cephalic Vein. Brisk  blood return, flushed with 20mls normal saline. Minimal blood loss. Patient tolerated procedure well. CHG dressing placed. All sharps accounted for. 
Left arm cleansed with CHG. Under ultrasound guidance, placed Arrow Endurance Extended Dwell Peripheral Catheter System 20G / 6cm into Left Median Vein. Brisk  blood return, flushed with 20mls normal saline. Minimal blood loss. Patient tolerated procedure well. CHG dressing placed. All sharps accounted for. 

## 2023-04-06 NOTE — PROGRESS NOTE ADULT - SUBJECTIVE AND OBJECTIVE BOX
CHIEF COMPLAINT: f/u     SUBJECTIVE / OVERNIGHT EVENTS: Patient seen and examined. No acute events overnight. States that his rash is less red today, denies itching or pain, denies fevers. States he has been using the aquaphor cream over the area.     MEDICATIONS  (STANDING):  acetaminophen     Tablet .. 975 milliGRAM(s) Oral every 8 hours  amLODIPine   Tablet 10 milliGRAM(s) Oral daily  ampicillin/sulbactam  IVPB      ampicillin/sulbactam  IVPB 3 Gram(s) IV Intermittent every 24 hours  AQUAPHOR (petrolatum Ointment) 1 Application(s) Topical two times a day  aspirin  chewable 81 milliGRAM(s) Oral daily  atorvastatin 40 milliGRAM(s) Oral at bedtime  collagenase Ointment 1 Application(s) Topical two times a day  dextrose 5%. 1000 milliLiter(s) (50 mL/Hr) IV Continuous <Continuous>  dextrose 5%. 1000 milliLiter(s) (100 mL/Hr) IV Continuous <Continuous>  dextrose 50% Injectable 25 Gram(s) IV Push once  dextrose 50% Injectable 12.5 Gram(s) IV Push once  dextrose 50% Injectable 25 Gram(s) IV Push once  dorzolamide 2%/timolol 0.5% Ophthalmic Solution 1 Drop(s) Both EYES two times a day  epoetin deidre-epbx (RETACRIT) Injectable 49983 Unit(s) IV Push <User Schedule>  glucagon  Injectable 1 milliGRAM(s) IntraMuscular once  insulin glargine Injectable (LANTUS) 4 Unit(s) SubCutaneous at bedtime  insulin lispro (ADMELOG) corrective regimen sliding scale   SubCutaneous at bedtime  insulin lispro (ADMELOG) corrective regimen sliding scale   SubCutaneous three times a day before meals  senna 2 Tablet(s) Oral at bedtime  Sucroferric Oxyhydroxide 2500mg Tablet 2 Tablet(s) 2 Tablet(s) Oral three times a day    MEDICATIONS  (PRN):  dextrose Oral Gel 15 Gram(s) Oral once PRN Blood Glucose LESS THAN 70 milliGRAM(s)/deciliter  oxyCODONE    IR 5 milliGRAM(s) Oral every 4 hours PRN Moderate Pain (4 - 6)  oxyCODONE    IR 10 milliGRAM(s) Oral every 4 hours PRN Severe Pain (7 - 10)      VITALS:  T(F): 98.2 (04-06-23 @ 09:49), Max: 98.4 (04-05-23 @ 20:15)  HR: 73 (04-06-23 @ 09:49) (61 - 73)  BP: 116/56 (04-06-23 @ 09:49) (109/51 - 145/55)  RR: 18 (04-06-23 @ 09:49) (18 - 18)  SpO2: 100% (04-06-23 @ 09:49)  Wt(kg): --      PHYSICAL EXAM:  GENERAL: NAD, well-developed  EYES: conjunctiva and sclera clear  CHEST/LUNG: Clear to auscultation bilaterally; No wheeze  HEART: Regular rate and rhythm; No murmurs, rubs, or gallops  ABDOMEN: Soft, Nontender, Nondistended; Bowel sounds present  EXTREMITIES:  s/p bilateral BKA; R forearm ACE wrap removed, well demarcated erythema with flaking skin past Integra graft- improved from yesterday   PSYCH: AAOx3    LABS:              9.8                  136  | 21   | 48           5.53  >-----------< 314     ------------------------< 112                   33.0                 4.6  | 96   | 6.96                                         Ca 8.6   Mg x     Ph x                      RADIOLOGY & ADDITIONAL TESTS:  Imaging Personally Reviewed: [x] Yes    [ ] Consultant(s) Notes Reviewed:  [x] Care Discussed with Consultants/Other Providers: Orthopedic PA - discussed

## 2023-04-06 NOTE — PROGRESS NOTE ADULT - ASSESSMENT
46 yo M sp multiple I&D of Right hand and forearm. Stable s/p  I&D and Integra placement on 4/4    Plan:  - Pain control  - DVT ppx  - IS  - Con't Unasyn until 4/17  - Monitor BP  - Dialysis MWF with labs at dialysis  - Derm Recs: daily aquaphor application    For all questions related to patient care, please reach out to the on-call team via the pager.     Renee Pope, PGY 1  Orthopaedic Surgery  [facility] [phone]  [facility] [phone]  [facility] [phone]

## 2023-04-06 NOTE — PROGRESS NOTE ADULT - SUBJECTIVE AND OBJECTIVE BOX
Overnight events: None    SUBJECTIVE: Pt seen and examined at bedside. No acute complaints, pain is well controlled.      OBJECTIVE:  Vital Signs Last 24 Hrs  T(C): 36.5 (06 Apr 2023 05:57), Max: 36.9 (05 Apr 2023 20:15)  T(F): 97.7 (06 Apr 2023 05:57), Max: 98.4 (05 Apr 2023 20:15)  HR: 70 (06 Apr 2023 05:57) (61 - 70)  BP: 109/51 (06 Apr 2023 05:57) (109/51 - 145/55)  BP(mean): --  RR: 18 (06 Apr 2023 05:57) (17 - 18)  SpO2: 100% (06 Apr 2023 05:57) (95% - 100%)    Parameters below as of 06 Apr 2023 05:57  Patient On (Oxygen Delivery Method): room air    04-04-23 @ 07:01  -  04-05-23 @ 07:00  --------------------------------------------------------  IN: 100 mL / OUT: 200 mL / NET: -100 mL    04-05-23 @ 07:01  -  04-06-23 @ 06:40  --------------------------------------------------------  IN: 400 mL / OUT: 2900 mL / NET: -2500 mL        Physical Examination:  GEN: NAD, resting quietly  PULM: symmetric chest rise bilaterally, no increased WOB  ABD: nondistended  EXTR:   LUE: dressing c/d/i, + gross motor, JAYNA, NIMO        LABS:                        9.8    5.53  )-----------( 314      ( 04 Apr 2023 20:05 )             33.0       04-04    136  |  96<L>  |  48<H>  ----------------------------<  112<H>  4.6   |  21<L>  |  6.96<H>    Ca    8.6      04 Apr 2023 20:05

## 2023-04-06 NOTE — PROGRESS NOTE ADULT - PROBLEM SELECTOR PLAN 6
-diabetic retinopathy.  severe proliferative diabetic retinopathy, previously on cosopt, ophtho outpt follow up   -IdV0f=4.5%  -c/w lantus 4 and ss  -c/w ASA daily  -c/w Atorvastatin 40 mg daily (started 3/31)

## 2023-04-06 NOTE — PROGRESS NOTE ADULT - PROBLEM SELECTOR PLAN 2
Erythematous, well demarcated area extending past intregra graft  - Seen by dermatology, suspect contact dermatitis. Recommend aquaphor for now and is symptomatic with pruritus, can start triamcinolone 0.1% ointment BID x 2 weeks

## 2023-04-06 NOTE — PROGRESS NOTE ADULT - SUBJECTIVE AND OBJECTIVE BOX
Patient is a 47y Male whom presented to the hospital with esrd on hd    PAST MEDICAL & SURGICAL HISTORY:  ESRD on dialysis      H/O hypotension      Diabetes mellitus      S/P BKA (below knee amputation) bilateral      AV fistula          MEDICATIONS  (STANDING):  acetaminophen     Tablet .. 975 milliGRAM(s) Oral every 8 hours  ampicillin/sulbactam  IVPB      aspirin  chewable 81 milliGRAM(s) Oral daily  dextrose 5%. 1000 milliLiter(s) (50 mL/Hr) IV Continuous <Continuous>  dextrose 5%. 1000 milliLiter(s) (100 mL/Hr) IV Continuous <Continuous>  dextrose 50% Injectable 25 Gram(s) IV Push once  dextrose 50% Injectable 12.5 Gram(s) IV Push once  dextrose 50% Injectable 25 Gram(s) IV Push once  glucagon  Injectable 1 milliGRAM(s) IntraMuscular once  insulin lispro (ADMELOG) corrective regimen sliding scale   SubCutaneous three times a day before meals  lactated ringers. 1000 milliLiter(s) (100 mL/Hr) IV Continuous <Continuous>      Allergies    No Known Drug Allergies  [place] (Rash)    Intolerances        SOCIAL HISTORY:  Denies ETOh,Smoking,     FAMILY HISTORY:  No pertinent family history in first degree relatives        REVIEW OF SYSTEMS:    CONSTITUTIONAL: No weakness, fevers or chills                                                                                    9.8    5.53  )-----------( 314      ( 04 Apr 2023 20:05 )             33.0       CBC Full  -  ( 04 Apr 2023 20:05 )  WBC Count : 5.53 K/uL  RBC Count : 3.70 M/uL  Hemoglobin : 9.8 g/dL  Hematocrit : 33.0 %  Platelet Count - Automated : 314 K/uL  Mean Cell Volume : 89.2 fL  Mean Cell Hemoglobin : 26.5 pg  Mean Cell Hemoglobin Concentration : 29.7 gm/dL  Auto Neutrophil # : x  Auto Lymphocyte # : x  Auto Monocyte # : x  Auto Eosinophil # : x  Auto Basophil # : x  Auto Neutrophil % : x  Auto Lymphocyte % : x  Auto Monocyte % : x  Auto Eosinophil % : x  Auto Basophil % : x      04-04    136  |  96<L>  |  48<H>  ----------------------------<  112<H>  4.6   |  21<L>  |  6.96<H>    Ca    8.6      04 Apr 2023 20:05        CAPILLARY BLOOD GLUCOSE      POCT Blood Glucose.: 198 mg/dL (06 Apr 2023 16:32)  POCT Blood Glucose.: 131 mg/dL (06 Apr 2023 10:58)  POCT Blood Glucose.: 124 mg/dL (06 Apr 2023 07:15)  POCT Blood Glucose.: 121 mg/dL (05 Apr 2023 22:26)      Vital Signs Last 24 Hrs  T(C): 36.6 (06 Apr 2023 17:20), Max: 36.9 (05 Apr 2023 20:15)  T(F): 97.9 (06 Apr 2023 17:20), Max: 98.4 (05 Apr 2023 20:15)  HR: 70 (06 Apr 2023 17:20) (62 - 73)  BP: 119/54 (06 Apr 2023 17:20) (109/51 - 130/72)  BP(mean): --  RR: 18 (06 Apr 2023 17:20) (18 - 18)  SpO2: 100% (06 Apr 2023 17:20) (95% - 100%)    Parameters below as of 06 Apr 2023 17:20  Patient On (Oxygen Delivery Method): room air                     PHYSICAL EXAM:    Constitutional: NAD  HEENT: conjunctive   clear   Neck:  No JVD  Respiratory: CTAB  Cardiovascular: S1 and S2  Gastrointestinal: BS+, soft, NT/ND  Extremities: No peripheral edema ,  Prior right 3rd digit amputation.S/P BKA (below knee amputation) bilateral  Access: pos fistula

## 2023-04-06 NOTE — ED ADULT NURSE NOTE - NS ED NOTE ABUSE RESPONSE YN
Received message pt has questions regarding having an MRI and her PPM.    Called pt, told pt her PPM system is MRI conditional, MRI dept will fax us an order form for device programming during scan once MRI is scheduled, pt v/u.   Yes

## 2023-04-07 LAB
ANION GAP SERPL CALC-SCNC: 22 MMOL/L — HIGH (ref 7–14)
BLD GP AB SCN SERPL QL: POSITIVE — SIGNIFICANT CHANGE UP
BUN SERPL-MCNC: 74 MG/DL — HIGH (ref 7–23)
CALCIUM SERPL-MCNC: 8.5 MG/DL — SIGNIFICANT CHANGE UP (ref 8.4–10.5)
CHLORIDE SERPL-SCNC: 97 MMOL/L — LOW (ref 98–107)
CO2 SERPL-SCNC: 18 MMOL/L — LOW (ref 22–31)
CREAT SERPL-MCNC: 9.53 MG/DL — HIGH (ref 0.5–1.3)
EGFR: 6 ML/MIN/1.73M2 — LOW
GLUCOSE BLDC GLUCOMTR-MCNC: 105 MG/DL — HIGH (ref 70–99)
GLUCOSE BLDC GLUCOMTR-MCNC: 126 MG/DL — HIGH (ref 70–99)
GLUCOSE BLDC GLUCOMTR-MCNC: 141 MG/DL — HIGH (ref 70–99)
GLUCOSE BLDC GLUCOMTR-MCNC: 191 MG/DL — HIGH (ref 70–99)
GLUCOSE BLDC GLUCOMTR-MCNC: 206 MG/DL — HIGH (ref 70–99)
GLUCOSE BLDC GLUCOMTR-MCNC: 93 MG/DL — SIGNIFICANT CHANGE UP (ref 70–99)
GLUCOSE SERPL-MCNC: 162 MG/DL — HIGH (ref 70–99)
HCT VFR BLD CALC: 28.4 % — LOW (ref 39–50)
HGB BLD-MCNC: 8.7 G/DL — LOW (ref 13–17)
MCHC RBC-ENTMCNC: 27.4 PG — SIGNIFICANT CHANGE UP (ref 27–34)
MCHC RBC-ENTMCNC: 30.6 GM/DL — LOW (ref 32–36)
MCV RBC AUTO: 89.3 FL — SIGNIFICANT CHANGE UP (ref 80–100)
NRBC # BLD: 0 /100 WBCS — SIGNIFICANT CHANGE UP (ref 0–0)
NRBC # FLD: 0 K/UL — SIGNIFICANT CHANGE UP (ref 0–0)
PLATELET # BLD AUTO: 331 K/UL — SIGNIFICANT CHANGE UP (ref 150–400)
POTASSIUM SERPL-MCNC: 5.8 MMOL/L — HIGH (ref 3.5–5.3)
POTASSIUM SERPL-SCNC: 5.8 MMOL/L — HIGH (ref 3.5–5.3)
RBC # BLD: 3.18 M/UL — LOW (ref 4.2–5.8)
RBC # FLD: 14.9 % — HIGH (ref 10.3–14.5)
RH IG SCN BLD-IMP: POSITIVE — SIGNIFICANT CHANGE UP
SODIUM SERPL-SCNC: 137 MMOL/L — SIGNIFICANT CHANGE UP (ref 135–145)
WBC # BLD: 6.68 K/UL — SIGNIFICANT CHANGE UP (ref 3.8–10.5)
WBC # FLD AUTO: 6.68 K/UL — SIGNIFICANT CHANGE UP (ref 3.8–10.5)

## 2023-04-07 PROCEDURE — 99232 SBSQ HOSP IP/OBS MODERATE 35: CPT

## 2023-04-07 RX ORDER — PETROLATUM,WHITE
1 JELLY (GRAM) TOPICAL DAILY
Refills: 0 | Status: DISCONTINUED | OUTPATIENT
Start: 2023-04-07 | End: 2023-04-14

## 2023-04-07 RX ADMIN — ATORVASTATIN CALCIUM 40 MILLIGRAM(S): 80 TABLET, FILM COATED ORAL at 21:48

## 2023-04-07 RX ADMIN — OXYCODONE HYDROCHLORIDE 10 MILLIGRAM(S): 5 TABLET ORAL at 21:48

## 2023-04-07 RX ADMIN — AMPICILLIN SODIUM AND SULBACTAM SODIUM 200 GRAM(S): 250; 125 INJECTION, POWDER, FOR SUSPENSION INTRAMUSCULAR; INTRAVENOUS at 21:48

## 2023-04-07 RX ADMIN — Medication 975 MILLIGRAM(S): at 06:19

## 2023-04-07 RX ADMIN — OXYCODONE HYDROCHLORIDE 10 MILLIGRAM(S): 5 TABLET ORAL at 07:10

## 2023-04-07 RX ADMIN — OXYCODONE HYDROCHLORIDE 10 MILLIGRAM(S): 5 TABLET ORAL at 12:19

## 2023-04-07 RX ADMIN — DORZOLAMIDE HYDROCHLORIDE TIMOLOL MALEATE 1 DROP(S): 20; 5 SOLUTION/ DROPS OPHTHALMIC at 18:35

## 2023-04-07 RX ADMIN — Medication 1 APPLICATION(S): at 06:19

## 2023-04-07 RX ADMIN — INSULIN GLARGINE 4 UNIT(S): 100 INJECTION, SOLUTION SUBCUTANEOUS at 21:48

## 2023-04-07 RX ADMIN — Medication 975 MILLIGRAM(S): at 21:49

## 2023-04-07 RX ADMIN — Medication 975 MILLIGRAM(S): at 07:10

## 2023-04-07 RX ADMIN — OXYCODONE HYDROCHLORIDE 10 MILLIGRAM(S): 5 TABLET ORAL at 06:24

## 2023-04-07 RX ADMIN — Medication 81 MILLIGRAM(S): at 12:19

## 2023-04-07 RX ADMIN — Medication 1 APPLICATION(S): at 18:35

## 2023-04-07 RX ADMIN — OXYCODONE HYDROCHLORIDE 10 MILLIGRAM(S): 5 TABLET ORAL at 22:55

## 2023-04-07 RX ADMIN — Medication 975 MILLIGRAM(S): at 22:55

## 2023-04-07 RX ADMIN — Medication 1 APPLICATION(S): at 12:19

## 2023-04-07 RX ADMIN — OXYCODONE HYDROCHLORIDE 10 MILLIGRAM(S): 5 TABLET ORAL at 13:19

## 2023-04-07 RX ADMIN — Medication 1 APPLICATION(S): at 06:25

## 2023-04-07 RX ADMIN — DORZOLAMIDE HYDROCHLORIDE TIMOLOL MALEATE 1 DROP(S): 20; 5 SOLUTION/ DROPS OPHTHALMIC at 06:19

## 2023-04-07 RX ADMIN — AMLODIPINE BESYLATE 10 MILLIGRAM(S): 2.5 TABLET ORAL at 06:27

## 2023-04-07 RX ADMIN — Medication 975 MILLIGRAM(S): at 14:03

## 2023-04-07 NOTE — PROGRESS NOTE ADULT - PROBLEM SELECTOR PLAN 5
-renal consulted for HD - MWF  -renally dose medications   -anemia of renal disease, c/w epo during HD as per renal  -renal restricted diet w/ nepro supplements    Note- patient has appointment at [facility] for renal transplant evaluation on 4/20 and requesting discharge by 4/14 to facilitate getting to this appointment - ortho team aware

## 2023-04-07 NOTE — PROGRESS NOTE ADULT - PROBLEM SELECTOR PLAN 6
-diabetic retinopathy.  severe proliferative diabetic retinopathy, previously on cosopt, ophtho outpt follow up   -MjO9k=9.5%  -c/w lantus 4 and ss  -c/w ASA daily  -c/w Atorvastatin 40 mg daily (started 3/31)

## 2023-04-07 NOTE — PROGRESS NOTE ADULT - PROBLEM SELECTOR PLAN 2
Erythematous, well demarcated area extending past intregra graft, improving   - Seen by dermatology, suspect contact dermatitis. Recommend aquaphor for now and is symptomatic with pruritus, can start triamcinolone 0.1% ointment BID x 2 weeks

## 2023-04-07 NOTE — PROGRESS NOTE ADULT - ASSESSMENT
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 Sin/Dr. Singh) who was seen by Dr. Sin today in the office and sent in to [facility] for urgent irrigation and debridement. Patient states he missed dialysis today because he was instructed to come straight to the ED.     PAST MEDICAL & SURGICAL HISTORY:  ESRD on dialysis  H/O hypotension  Diabetes mellitus  S/P BKA (below knee amputation) bilateral  AV fistula (06 Mar 2023 17:32)      BP monitoring,continue current antihypertensive meds, low salt diet,followup with PMD in 1-2 weeks        esrd on hd , will plan for hd as order    Excess fluids and waste products will be removed from your blood; your electrolytes will be balanced; your blood pressure will be controlled.      ANEMIA PLAN:  Anemia of chronic disease:  We will continue epo aiming for a HCT of 32-36 %.   We will monitor Iron stores, B12 and RBC folate .      send blood and urine cx,serial lactate levels,monitor vitals closley,ivfs hydration,monitor urine output and renal profile,iv abx

## 2023-04-07 NOTE — PROGRESS NOTE ADULT - SUBJECTIVE AND OBJECTIVE BOX
CHIEF COMPLAINT: f/u     SUBJECTIVE / OVERNIGHT EVENTS: Patient seen and examined. No acute events overnight. Denies itching or pain from rash, denies fevers. Feels well and has no complaints     MEDICATIONS  (STANDING):  acetaminophen     Tablet .. 975 milliGRAM(s) Oral every 8 hours  amLODIPine   Tablet 10 milliGRAM(s) Oral daily  ampicillin/sulbactam  IVPB 3 Gram(s) IV Intermittent every 24 hours  ampicillin/sulbactam  IVPB      AQUAPHOR (petrolatum Ointment) 1 Application(s) Topical daily  AQUAPHOR (petrolatum Ointment) 1 Application(s) Topical two times a day  aspirin  chewable 81 milliGRAM(s) Oral daily  atorvastatin 40 milliGRAM(s) Oral at bedtime  collagenase Ointment 1 Application(s) Topical two times a day  dextrose 5%. 1000 milliLiter(s) (50 mL/Hr) IV Continuous <Continuous>  dextrose 5%. 1000 milliLiter(s) (100 mL/Hr) IV Continuous <Continuous>  dextrose 50% Injectable 25 Gram(s) IV Push once  dextrose 50% Injectable 12.5 Gram(s) IV Push once  dextrose 50% Injectable 25 Gram(s) IV Push once  dorzolamide 2%/timolol 0.5% Ophthalmic Solution 1 Drop(s) Both EYES two times a day  epoetin deidre-epbx (RETACRIT) Injectable 00772 Unit(s) IV Push <User Schedule>  glucagon  Injectable 1 milliGRAM(s) IntraMuscular once  insulin glargine Injectable (LANTUS) 4 Unit(s) SubCutaneous at bedtime  insulin lispro (ADMELOG) corrective regimen sliding scale   SubCutaneous at bedtime  insulin lispro (ADMELOG) corrective regimen sliding scale   SubCutaneous three times a day before meals  senna 2 Tablet(s) Oral at bedtime  Sucroferric Oxyhydroxide 2500mg Tablet 2 Tablet(s) 2 Tablet(s) Oral three times a day    MEDICATIONS  (PRN):  dextrose Oral Gel 15 Gram(s) Oral once PRN Blood Glucose LESS THAN 70 milliGRAM(s)/deciliter  oxyCODONE    IR 5 milliGRAM(s) Oral every 4 hours PRN Moderate Pain (4 - 6)  oxyCODONE    IR 10 milliGRAM(s) Oral every 4 hours PRN Severe Pain (7 - 10)      VITALS:  T(F): 98.2 (04-07-23 @ 09:40), Max: 98.2 (04-07-23 @ 09:40)  HR: 65 (04-07-23 @ 09:40) (62 - 70)  BP: 129/59 (04-07-23 @ 09:40) (119/54 - 143/77)  RR: 18 (04-07-23 @ 09:40) (16 - 18)  SpO2: 100% (04-07-23 @ 09:40)  Wt(kg): --      PHYSICAL EXAM:  GENERAL: NAD, well-developed  EYES: conjunctiva and sclera clear  CHEST/LUNG: Clear to auscultation bilaterally; No wheeze  HEART: Regular rate and rhythm; No murmurs, rubs, or gallops  ABDOMEN: Soft, Nontender, Nondistended; Bowel sounds present  EXTREMITIES:  s/p bilateral BKA; R forearm ACE wrap   PSYCH: AAOx3      RADIOLOGY & ADDITIONAL TESTS:  Imaging Personally Reviewed: [x] Yes    [x] Care Discussed with Consultants/Other Providers: Orthopedic PA - discussed

## 2023-04-07 NOTE — PROGRESS NOTE ADULT - SUBJECTIVE AND OBJECTIVE BOX
ORTHO PROGRESS NOTE     Patient resting comfortable without complaint, will have HD today      Vital Signs Last 24 Hrs  T(C): 36.4 (07 Apr 2023 01:35), Max: 36.8 (06 Apr 2023 09:49)  T(F): 97.6 (07 Apr 2023 01:35), Max: 98.2 (06 Apr 2023 09:49)  HR: 62 (07 Apr 2023 01:35) (62 - 73)  BP: 140/65 (07 Apr 2023 01:35) (116/56 - 143/77)  BP(mean): --  RR: 16 (07 Apr 2023 01:35) (16 - 18)  SpO2: 100% (07 Apr 2023 01:35) (98% - 100%)    Parameters below as of 07 Apr 2023 01:35  Patient On (Oxygen Delivery Method): room air          RUE: Dressing clean/dry/intact                A/P:  Stable             PT- non weight bearing Rupper extremity            Aquaphor daily to rash                      DVT prophylaxis- ASA daily            Follow up HD labs             Pain control             Incentive spirometer

## 2023-04-07 NOTE — PROGRESS NOTE ADULT - SUBJECTIVE AND OBJECTIVE BOX
Patient is a 47y Male whom presented to the hospital with esrd on hd    PAST MEDICAL & SURGICAL HISTORY:  ESRD on dialysis      H/O hypotension      Diabetes mellitus      S/P BKA (below knee amputation) bilateral      AV fistula          MEDICATIONS  (STANDING):  acetaminophen     Tablet .. 975 milliGRAM(s) Oral every 8 hours  ampicillin/sulbactam  IVPB      aspirin  chewable 81 milliGRAM(s) Oral daily  dextrose 5%. 1000 milliLiter(s) (50 mL/Hr) IV Continuous <Continuous>  dextrose 5%. 1000 milliLiter(s) (100 mL/Hr) IV Continuous <Continuous>  dextrose 50% Injectable 25 Gram(s) IV Push once  dextrose 50% Injectable 12.5 Gram(s) IV Push once  dextrose 50% Injectable 25 Gram(s) IV Push once  glucagon  Injectable 1 milliGRAM(s) IntraMuscular once  insulin lispro (ADMELOG) corrective regimen sliding scale   SubCutaneous three times a day before meals  lactated ringers. 1000 milliLiter(s) (100 mL/Hr) IV Continuous <Continuous>      Allergies    No Known Drug Allergies  [place] (Rash)    Intolerances        SOCIAL HISTORY:  Denies ETOh,Smoking,     FAMILY HISTORY:  No pertinent family history in first degree relatives        REVIEW OF SYSTEMS:    CONSTITUTIONAL: No weakness, fevers or chills                                                                                             8.7    6.68  )-----------( 331      ( 07 Apr 2023 14:35 )             28.4       CBC Full  -  ( 07 Apr 2023 14:35 )  WBC Count : 6.68 K/uL  RBC Count : 3.18 M/uL  Hemoglobin : 8.7 g/dL  Hematocrit : 28.4 %  Platelet Count - Automated : 331 K/uL  Mean Cell Volume : 89.3 fL  Mean Cell Hemoglobin : 27.4 pg  Mean Cell Hemoglobin Concentration : 30.6 gm/dL  Auto Neutrophil # : x  Auto Lymphocyte # : x  Auto Monocyte # : x  Auto Eosinophil # : x  Auto Basophil # : x  Auto Neutrophil % : x  Auto Lymphocyte % : x  Auto Monocyte % : x  Auto Eosinophil % : x  Auto Basophil % : x      04-07    137  |  97<L>  |  74<H>  ----------------------------<  162<H>  5.8<H>   |  18<L>  |  9.53<H>    Ca    8.5      07 Apr 2023 14:35        CAPILLARY BLOOD GLUCOSE      POCT Blood Glucose.: 126 mg/dL (07 Apr 2023 18:26)  POCT Blood Glucose.: 105 mg/dL (07 Apr 2023 17:56)  POCT Blood Glucose.: 191 mg/dL (07 Apr 2023 14:05)  POCT Blood Glucose.: 141 mg/dL (07 Apr 2023 11:10)  POCT Blood Glucose.: 93 mg/dL (07 Apr 2023 07:08)  POCT Blood Glucose.: 99 mg/dL (06 Apr 2023 21:19)      Vital Signs Last 24 Hrs  T(C): 36.4 (07 Apr 2023 18:42), Max: 36.8 (07 Apr 2023 09:40)  T(F): 97.6 (07 Apr 2023 18:42), Max: 98.2 (07 Apr 2023 09:40)  HR: 75 (07 Apr 2023 18:42) (62 - 75)  BP: 142/52 (07 Apr 2023 18:42) (129/59 - 145/73)  BP(mean): --  RR: 18 (07 Apr 2023 18:42) (16 - 18)  SpO2: 100% (07 Apr 2023 18:42) (98% - 100%)    Parameters below as of 07 Apr 2023 18:42  Patient On (Oxygen Delivery Method): room air                           PHYSICAL EXAM:    Constitutional: NAD  HEENT: conjunctive   clear   Neck:  No JVD  Respiratory: CTAB  Cardiovascular: S1 and S2  Gastrointestinal: BS+, soft, NT/ND  Extremities: No peripheral edema ,  Prior right 3rd digit amputation.S/P BKA (below knee amputation) bilateral  Access: pos fistula

## 2023-04-08 LAB
GLUCOSE BLDC GLUCOMTR-MCNC: 112 MG/DL — HIGH (ref 70–99)
GLUCOSE BLDC GLUCOMTR-MCNC: 143 MG/DL — HIGH (ref 70–99)
GLUCOSE BLDC GLUCOMTR-MCNC: 167 MG/DL — HIGH (ref 70–99)
GLUCOSE BLDC GLUCOMTR-MCNC: 90 MG/DL — SIGNIFICANT CHANGE UP (ref 70–99)

## 2023-04-08 PROCEDURE — 99232 SBSQ HOSP IP/OBS MODERATE 35: CPT

## 2023-04-08 RX ORDER — CHLORHEXIDINE GLUCONATE 213 G/1000ML
1 SOLUTION TOPICAL
Refills: 0 | Status: DISCONTINUED | OUTPATIENT
Start: 2023-04-08 | End: 2023-04-14

## 2023-04-08 RX ADMIN — INSULIN GLARGINE 4 UNIT(S): 100 INJECTION, SOLUTION SUBCUTANEOUS at 22:13

## 2023-04-08 RX ADMIN — Medication 2: at 16:59

## 2023-04-08 RX ADMIN — Medication 1 APPLICATION(S): at 06:05

## 2023-04-08 RX ADMIN — AMPICILLIN SODIUM AND SULBACTAM SODIUM 200 GRAM(S): 250; 125 INJECTION, POWDER, FOR SUSPENSION INTRAMUSCULAR; INTRAVENOUS at 22:14

## 2023-04-08 RX ADMIN — OXYCODONE HYDROCHLORIDE 10 MILLIGRAM(S): 5 TABLET ORAL at 18:03

## 2023-04-08 RX ADMIN — Medication 975 MILLIGRAM(S): at 13:14

## 2023-04-08 RX ADMIN — Medication 1 APPLICATION(S): at 12:14

## 2023-04-08 RX ADMIN — Medication 975 MILLIGRAM(S): at 22:13

## 2023-04-08 RX ADMIN — Medication 975 MILLIGRAM(S): at 07:05

## 2023-04-08 RX ADMIN — Medication 975 MILLIGRAM(S): at 12:14

## 2023-04-08 RX ADMIN — Medication 975 MILLIGRAM(S): at 06:04

## 2023-04-08 RX ADMIN — OXYCODONE HYDROCHLORIDE 10 MILLIGRAM(S): 5 TABLET ORAL at 23:20

## 2023-04-08 RX ADMIN — DORZOLAMIDE HYDROCHLORIDE TIMOLOL MALEATE 1 DROP(S): 20; 5 SOLUTION/ DROPS OPHTHALMIC at 17:03

## 2023-04-08 RX ADMIN — Medication 1 APPLICATION(S): at 17:04

## 2023-04-08 RX ADMIN — Medication 1 APPLICATION(S): at 06:04

## 2023-04-08 RX ADMIN — Medication 975 MILLIGRAM(S): at 23:20

## 2023-04-08 RX ADMIN — Medication 81 MILLIGRAM(S): at 12:14

## 2023-04-08 RX ADMIN — OXYCODONE HYDROCHLORIDE 10 MILLIGRAM(S): 5 TABLET ORAL at 17:03

## 2023-04-08 RX ADMIN — OXYCODONE HYDROCHLORIDE 5 MILLIGRAM(S): 5 TABLET ORAL at 12:14

## 2023-04-08 RX ADMIN — DORZOLAMIDE HYDROCHLORIDE TIMOLOL MALEATE 1 DROP(S): 20; 5 SOLUTION/ DROPS OPHTHALMIC at 06:05

## 2023-04-08 RX ADMIN — OXYCODONE HYDROCHLORIDE 10 MILLIGRAM(S): 5 TABLET ORAL at 07:05

## 2023-04-08 RX ADMIN — ATORVASTATIN CALCIUM 40 MILLIGRAM(S): 80 TABLET, FILM COATED ORAL at 22:13

## 2023-04-08 RX ADMIN — OXYCODONE HYDROCHLORIDE 5 MILLIGRAM(S): 5 TABLET ORAL at 13:14

## 2023-04-08 RX ADMIN — OXYCODONE HYDROCHLORIDE 10 MILLIGRAM(S): 5 TABLET ORAL at 06:04

## 2023-04-08 RX ADMIN — OXYCODONE HYDROCHLORIDE 10 MILLIGRAM(S): 5 TABLET ORAL at 22:13

## 2023-04-08 NOTE — PROGRESS NOTE ADULT - PROBLEM SELECTOR PLAN 2
Erythematous, well demarcated area extending past intregra graft, improving   - Seen by dermatology, suspect contact dermatitis. Recommend aquaphor for now and is symptomatic with pruritus, can start triamcinolone 0.1% ointment BID x 2 weeks  - Patient reports no itching at this time

## 2023-04-08 NOTE — PROGRESS NOTE ADULT - ASSESSMENT
46 yo M sp multiple I&D of Right hand and forearm. Stable s/p  I&D and Integra placement on 4/4    Plan:  - Pain control  - DVT ppx  - IS  - Con't Unasyn until 4/17  - Monitor BP  - Dialysis MWF with labs at dialysis  - Derm Recs: daily aquaphor application

## 2023-04-08 NOTE — PROGRESS NOTE ADULT - ASSESSMENT
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 Sin/Dr. Singh) who was seen by Dr. Sin today in the office and sent in to [facility] for urgent irrigation and debridement. Patient states he missed dialysis today because he was instructed to come straight to the ED.     PAST MEDICAL & SURGICAL HISTORY:  ESRD on dialysis  H/O hypotension  Diabetes mellitus  S/P BKA (below knee amputation) bilateral  AV fistula (06 Mar 2023 17:32)      BP monitoring,continue current antihypertensive meds, low salt diet,followup with PMD in 1-2 weeks        esrd on hd , will plan for hd as order    Excess fluids and waste products will be removed from your blood; your electrolytes will be balanced; your blood pressure will be controlled.      ANEMIA PLAN:  Anemia of chronic disease:  We will continue epo aiming for a HCT of 32-36 %.   We will monitor Iron stores, B12 and RBC folate .      send blood and urine cx,serial lactate levels,monitor vitals closley,ivfs hydration,monitor urine output and renal profile,iv abx

## 2023-04-08 NOTE — PROGRESS NOTE ADULT - SUBJECTIVE AND OBJECTIVE BOX
CHIEF COMPLAINT: f/u     SUBJECTIVE / OVERNIGHT EVENTS: Patient seen and examined. No acute events overnight. Denies itching or pain from rash, denies fevers. Feels well and has no complaints     MEDICATIONS  (STANDING):  acetaminophen     Tablet .. 975 milliGRAM(s) Oral every 8 hours  amLODIPine   Tablet 10 milliGRAM(s) Oral daily  ampicillin/sulbactam  IVPB 3 Gram(s) IV Intermittent every 24 hours  ampicillin/sulbactam  IVPB      AQUAPHOR (petrolatum Ointment) 1 Application(s) Topical daily  AQUAPHOR (petrolatum Ointment) 1 Application(s) Topical two times a day  aspirin  chewable 81 milliGRAM(s) Oral daily  atorvastatin 40 milliGRAM(s) Oral at bedtime  collagenase Ointment 1 Application(s) Topical two times a day  dextrose 5%. 1000 milliLiter(s) (50 mL/Hr) IV Continuous <Continuous>  dextrose 5%. 1000 milliLiter(s) (100 mL/Hr) IV Continuous <Continuous>  dextrose 50% Injectable 25 Gram(s) IV Push once  dextrose 50% Injectable 12.5 Gram(s) IV Push once  dextrose 50% Injectable 25 Gram(s) IV Push once  dorzolamide 2%/timolol 0.5% Ophthalmic Solution 1 Drop(s) Both EYES two times a day  epoetin deidre-epbx (RETACRIT) Injectable 51177 Unit(s) IV Push <User Schedule>  glucagon  Injectable 1 milliGRAM(s) IntraMuscular once  insulin glargine Injectable (LANTUS) 4 Unit(s) SubCutaneous at bedtime  insulin lispro (ADMELOG) corrective regimen sliding scale   SubCutaneous at bedtime  insulin lispro (ADMELOG) corrective regimen sliding scale   SubCutaneous three times a day before meals  senna 2 Tablet(s) Oral at bedtime  Sucroferric Oxyhydroxide 2500mg Tablet 2 Tablet(s) 2 Tablet(s) Oral three times a day    MEDICATIONS  (PRN):  dextrose Oral Gel 15 Gram(s) Oral once PRN Blood Glucose LESS THAN 70 milliGRAM(s)/deciliter  oxyCODONE    IR 5 milliGRAM(s) Oral every 4 hours PRN Moderate Pain (4 - 6)  oxyCODONE    IR 10 milliGRAM(s) Oral every 4 hours PRN Severe Pain (7 - 10)      VITALS:  T(F): 97.7 (04-08-23 @ 09:48), Max: 99 (04-07-23 @ 21:12)  HR: 61 (04-08-23 @ 09:48) (60 - 75)  BP: 108/50 (04-08-23 @ 09:48) (108/50 - 145/73)  RR: 18 (04-08-23 @ 09:48) (16 - 18)  SpO2: 99% (04-08-23 @ 09:48)  Wt(kg): --      PHYSICAL EXAM:  GENERAL: NAD, well-developed  EYES: conjunctiva and sclera clear  CHEST/LUNG: Clear to auscultation bilaterally; No wheeze  HEART: Regular rate and rhythm; No murmurs, rubs, or gallops  ABDOMEN: Soft, Nontender, Nondistended; Bowel sounds present  EXTREMITIES:  s/p bilateral BKA; R forearm ACE wrap   PSYCH: AAOx3    LABS:              8.7                  137  | 18   | 74           6.68  >-----------< 331     ------------------------< 162                   28.4                 5.8  | 97   | 9.53                                         Ca 8.5   Mg x     Ph x                      RADIOLOGY & ADDITIONAL TESTS:  Imaging Personally Reviewed: [x] Yes    [x] Care Discussed with Consultants/Other Providers: Orthopedic PA - discussed

## 2023-04-08 NOTE — PROGRESS NOTE ADULT - PROBLEM SELECTOR PLAN 6
-diabetic retinopathy.  severe proliferative diabetic retinopathy, previously on cosopt, ophtho outpt follow up   -VwJ6i=3.5%  -c/w lantus 4 and ss  -c/w ASA daily  -c/w Atorvastatin 40 mg daily (started 3/31)

## 2023-04-08 NOTE — PROGRESS NOTE ADULT - SUBJECTIVE AND OBJECTIVE BOX
Patient is a 47y Male whom presented to the hospital with esrd on hd    PAST MEDICAL & SURGICAL HISTORY:  ESRD on dialysis      H/O hypotension      Diabetes mellitus      S/P BKA (below knee amputation) bilateral      AV fistula          MEDICATIONS  (STANDING):  acetaminophen     Tablet .. 975 milliGRAM(s) Oral every 8 hours  ampicillin/sulbactam  IVPB      aspirin  chewable 81 milliGRAM(s) Oral daily  dextrose 5%. 1000 milliLiter(s) (50 mL/Hr) IV Continuous <Continuous>  dextrose 5%. 1000 milliLiter(s) (100 mL/Hr) IV Continuous <Continuous>  dextrose 50% Injectable 25 Gram(s) IV Push once  dextrose 50% Injectable 12.5 Gram(s) IV Push once  dextrose 50% Injectable 25 Gram(s) IV Push once  glucagon  Injectable 1 milliGRAM(s) IntraMuscular once  insulin lispro (ADMELOG) corrective regimen sliding scale   SubCutaneous three times a day before meals  lactated ringers. 1000 milliLiter(s) (100 mL/Hr) IV Continuous <Continuous>      Allergies    No Known Drug Allergies  [place] (Rash)    Intolerances        SOCIAL HISTORY:  Denies ETOh,Smoking,     FAMILY HISTORY:  No pertinent family history in first degree relatives        REVIEW OF SYSTEMS:    CONSTITUTIONAL: No weakness, fevers or chills                                                        8.7    6.68  )-----------( 331      ( 07 Apr 2023 14:35 )             28.4       CBC Full  -  ( 07 Apr 2023 14:35 )  WBC Count : 6.68 K/uL  RBC Count : 3.18 M/uL  Hemoglobin : 8.7 g/dL  Hematocrit : 28.4 %  Platelet Count - Automated : 331 K/uL  Mean Cell Volume : 89.3 fL  Mean Cell Hemoglobin : 27.4 pg  Mean Cell Hemoglobin Concentration : 30.6 gm/dL  Auto Neutrophil # : x  Auto Lymphocyte # : x  Auto Monocyte # : x  Auto Eosinophil # : x  Auto Basophil # : x  Auto Neutrophil % : x  Auto Lymphocyte % : x  Auto Monocyte % : x  Auto Eosinophil % : x  Auto Basophil % : x      04-07    137  |  97<L>  |  74<H>  ----------------------------<  162<H>  5.8<H>   |  18<L>  |  9.53<H>    Ca    8.5      07 Apr 2023 14:35        CAPILLARY BLOOD GLUCOSE      POCT Blood Glucose.: 167 mg/dL (08 Apr 2023 16:44)  POCT Blood Glucose.: 112 mg/dL (08 Apr 2023 11:28)  POCT Blood Glucose.: 90 mg/dL (08 Apr 2023 07:16)  POCT Blood Glucose.: 206 mg/dL (07 Apr 2023 21:23)  POCT Blood Glucose.: 126 mg/dL (07 Apr 2023 18:26)  POCT Blood Glucose.: 105 mg/dL (07 Apr 2023 17:56)      Vital Signs Last 24 Hrs  T(C): 36.5 (08 Apr 2023 09:48), Max: 37.2 (07 Apr 2023 21:12)  T(F): 97.7 (08 Apr 2023 09:48), Max: 99 (07 Apr 2023 21:12)  HR: 61 (08 Apr 2023 09:48) (60 - 75)  BP: 108/50 (08 Apr 2023 09:48) (108/50 - 142/69)  BP(mean): --  RR: 18 (08 Apr 2023 09:48) (18 - 18)  SpO2: 99% (08 Apr 2023 09:48) (98% - 100%)    Parameters below as of 08 Apr 2023 09:48  Patient On (Oxygen Delivery Method): room air                             PHYSICAL EXAM:    Constitutional: NAD  HEENT: conjunctive   clear   Neck:  No JVD  Respiratory: CTAB  Cardiovascular: S1 and S2  Gastrointestinal: BS+, soft, NT/ND  Extremities: No peripheral edema ,  Prior right 3rd digit amputation.S/P BKA (below knee amputation) bilateral  Access: pos fistula

## 2023-04-08 NOTE — PROGRESS NOTE ADULT - SUBJECTIVE AND OBJECTIVE BOX
ORTHOPAEDICS DAILY PROGRESS NOTE:       SUBJECTIVE/ROS:  Seen and examined. Pain well controlled.          MEDICATIONS  (STANDING):  acetaminophen     Tablet .. 975 milliGRAM(s) Oral every 8 hours  amLODIPine   Tablet 10 milliGRAM(s) Oral daily  ampicillin/sulbactam  IVPB 3 Gram(s) IV Intermittent every 24 hours  ampicillin/sulbactam  IVPB      AQUAPHOR (petrolatum Ointment) 1 Application(s) Topical daily  AQUAPHOR (petrolatum Ointment) 1 Application(s) Topical two times a day  aspirin  chewable 81 milliGRAM(s) Oral daily  atorvastatin 40 milliGRAM(s) Oral at bedtime  collagenase Ointment 1 Application(s) Topical two times a day  dextrose 5%. 1000 milliLiter(s) (50 mL/Hr) IV Continuous <Continuous>  dextrose 5%. 1000 milliLiter(s) (100 mL/Hr) IV Continuous <Continuous>  dextrose 50% Injectable 25 Gram(s) IV Push once  dextrose 50% Injectable 12.5 Gram(s) IV Push once  dextrose 50% Injectable 25 Gram(s) IV Push once  dorzolamide 2%/timolol 0.5% Ophthalmic Solution 1 Drop(s) Both EYES two times a day  epoetin deidre-epbx (RETACRIT) Injectable 77919 Unit(s) IV Push <User Schedule>  glucagon  Injectable 1 milliGRAM(s) IntraMuscular once  insulin glargine Injectable (LANTUS) 4 Unit(s) SubCutaneous at bedtime  insulin lispro (ADMELOG) corrective regimen sliding scale   SubCutaneous at bedtime  insulin lispro (ADMELOG) corrective regimen sliding scale   SubCutaneous three times a day before meals  senna 2 Tablet(s) Oral at bedtime  Sucroferric Oxyhydroxide 2500mg Tablet 2 Tablet(s) 2 Tablet(s) Oral three times a day    MEDICATIONS  (PRN):  dextrose Oral Gel 15 Gram(s) Oral once PRN Blood Glucose LESS THAN 70 milliGRAM(s)/deciliter  oxyCODONE    IR 5 milliGRAM(s) Oral every 4 hours PRN Moderate Pain (4 - 6)  oxyCODONE    IR 10 milliGRAM(s) Oral every 4 hours PRN Severe Pain (7 - 10)      OBJECTIVE:    Vital Signs Last 24 Hrs  T(C): 37.2 (2023 21:12), Max: 37.2 (2023 21:12)  T(F): 99 (2023 21:12), Max: 99 (2023 21:12)  HR: 74 (2023 21:12) (60 - 75)  BP: 142/66 (2023 21:12) (129/59 - 145/73)  BP(mean): --  RR: 18 (2023 21:12) (16 - 18)  SpO2: 99% (2023 21:12) (99% - 100%)    Parameters below as of 2023 21:12  Patient On (Oxygen Delivery Method): room air        I&O's Detail    2023 07:01  -  2023 07:00  --------------------------------------------------------  IN:    Oral Fluid: 480 mL  Total IN: 480 mL    OUT:  Total OUT: 0 mL    Total NET: 480 mL      2023 07:01  -  2023 00:15  --------------------------------------------------------  IN:    Oral Fluid: 480 mL    Other (mL): 400 mL  Total IN: 880 mL    OUT:    Other (mL): 2900 mL  Total OUT: 2900 mL    Total NET: -2020 mL          Daily     Daily Weight in k.3 (2023 18:10)    LABS:                        8.7    6.68  )-----------( 331      ( 2023 14:35 )             28.4     04-07    137  |  97<L>  |  74<H>  ----------------------------<  162<H>  5.8<H>   |  18<L>  |  9.53<H>    Ca    8.5      2023 14:35                    PHYSICAL EXAM:  nad  nonlabored resp  rue  dressing c/d/i  ace  bob joseer  wwp

## 2023-04-08 NOTE — PROGRESS NOTE ADULT - PROBLEM SELECTOR PLAN 5
-renal consulted for HD - MWF  -renally dose medications   -anemia of renal disease, c/w epo during HD as per renal  -renal restricted diet w/ nepro supplements    Note- patient has appointment at [facility] for renal transplant evaluation on 4/20 and requesting discharge by 4/14 to facilitate getting to this appointment - ortho team aware

## 2023-04-09 LAB
ANION GAP SERPL CALC-SCNC: 21 MMOL/L — HIGH (ref 7–14)
BUN SERPL-MCNC: 68 MG/DL — HIGH (ref 7–23)
CALCIUM SERPL-MCNC: 8.5 MG/DL — SIGNIFICANT CHANGE UP (ref 8.4–10.5)
CHLORIDE SERPL-SCNC: 94 MMOL/L — LOW (ref 98–107)
CO2 SERPL-SCNC: 23 MMOL/L — SIGNIFICANT CHANGE UP (ref 22–31)
CREAT SERPL-MCNC: 7.42 MG/DL — HIGH (ref 0.5–1.3)
EGFR: 8 ML/MIN/1.73M2 — LOW
GLUCOSE BLDC GLUCOMTR-MCNC: 122 MG/DL — HIGH (ref 70–99)
GLUCOSE BLDC GLUCOMTR-MCNC: 145 MG/DL — HIGH (ref 70–99)
GLUCOSE BLDC GLUCOMTR-MCNC: 150 MG/DL — HIGH (ref 70–99)
GLUCOSE BLDC GLUCOMTR-MCNC: 154 MG/DL — HIGH (ref 70–99)
GLUCOSE SERPL-MCNC: 139 MG/DL — HIGH (ref 70–99)
HCT VFR BLD CALC: 29.8 % — LOW (ref 39–50)
HGB BLD-MCNC: 8.8 G/DL — LOW (ref 13–17)
MCHC RBC-ENTMCNC: 26.5 PG — LOW (ref 27–34)
MCHC RBC-ENTMCNC: 29.5 GM/DL — LOW (ref 32–36)
MCV RBC AUTO: 89.8 FL — SIGNIFICANT CHANGE UP (ref 80–100)
NRBC # BLD: 0 /100 WBCS — SIGNIFICANT CHANGE UP (ref 0–0)
NRBC # FLD: 0 K/UL — SIGNIFICANT CHANGE UP (ref 0–0)
PLATELET # BLD AUTO: 329 K/UL — SIGNIFICANT CHANGE UP (ref 150–400)
POTASSIUM SERPL-MCNC: 5.4 MMOL/L — HIGH (ref 3.5–5.3)
POTASSIUM SERPL-SCNC: 5.4 MMOL/L — HIGH (ref 3.5–5.3)
RBC # BLD: 3.32 M/UL — LOW (ref 4.2–5.8)
RBC # FLD: 14.8 % — HIGH (ref 10.3–14.5)
SODIUM SERPL-SCNC: 138 MMOL/L — SIGNIFICANT CHANGE UP (ref 135–145)
WBC # BLD: 5.32 K/UL — SIGNIFICANT CHANGE UP (ref 3.8–10.5)
WBC # FLD AUTO: 5.32 K/UL — SIGNIFICANT CHANGE UP (ref 3.8–10.5)

## 2023-04-09 PROCEDURE — 99232 SBSQ HOSP IP/OBS MODERATE 35: CPT

## 2023-04-09 RX ORDER — OXYCODONE HYDROCHLORIDE 5 MG/1
10 TABLET ORAL EVERY 4 HOURS
Refills: 0 | Status: DISCONTINUED | OUTPATIENT
Start: 2023-04-09 | End: 2023-04-14

## 2023-04-09 RX ORDER — OXYCODONE HYDROCHLORIDE 5 MG/1
5 TABLET ORAL EVERY 4 HOURS
Refills: 0 | Status: DISCONTINUED | OUTPATIENT
Start: 2023-04-09 | End: 2023-04-14

## 2023-04-09 RX ADMIN — AMPICILLIN SODIUM AND SULBACTAM SODIUM 200 GRAM(S): 250; 125 INJECTION, POWDER, FOR SUSPENSION INTRAMUSCULAR; INTRAVENOUS at 22:04

## 2023-04-09 RX ADMIN — CHLORHEXIDINE GLUCONATE 1 APPLICATION(S): 213 SOLUTION TOPICAL at 06:04

## 2023-04-09 RX ADMIN — Medication 975 MILLIGRAM(S): at 23:21

## 2023-04-09 RX ADMIN — Medication 81 MILLIGRAM(S): at 12:52

## 2023-04-09 RX ADMIN — Medication 1 APPLICATION(S): at 17:02

## 2023-04-09 RX ADMIN — Medication 975 MILLIGRAM(S): at 12:45

## 2023-04-09 RX ADMIN — INSULIN GLARGINE 4 UNIT(S): 100 INJECTION, SOLUTION SUBCUTANEOUS at 22:19

## 2023-04-09 RX ADMIN — OXYCODONE HYDROCHLORIDE 10 MILLIGRAM(S): 5 TABLET ORAL at 08:44

## 2023-04-09 RX ADMIN — OXYCODONE HYDROCHLORIDE 10 MILLIGRAM(S): 5 TABLET ORAL at 13:28

## 2023-04-09 RX ADMIN — OXYCODONE HYDROCHLORIDE 10 MILLIGRAM(S): 5 TABLET ORAL at 19:21

## 2023-04-09 RX ADMIN — Medication 1 APPLICATION(S): at 06:02

## 2023-04-09 RX ADMIN — Medication 975 MILLIGRAM(S): at 06:02

## 2023-04-09 RX ADMIN — Medication 1 APPLICATION(S): at 19:11

## 2023-04-09 RX ADMIN — OXYCODONE HYDROCHLORIDE 10 MILLIGRAM(S): 5 TABLET ORAL at 23:21

## 2023-04-09 RX ADMIN — ATORVASTATIN CALCIUM 40 MILLIGRAM(S): 80 TABLET, FILM COATED ORAL at 22:02

## 2023-04-09 RX ADMIN — Medication 975 MILLIGRAM(S): at 13:27

## 2023-04-09 RX ADMIN — OXYCODONE HYDROCHLORIDE 10 MILLIGRAM(S): 5 TABLET ORAL at 07:44

## 2023-04-09 RX ADMIN — DORZOLAMIDE HYDROCHLORIDE TIMOLOL MALEATE 1 DROP(S): 20; 5 SOLUTION/ DROPS OPHTHALMIC at 06:03

## 2023-04-09 RX ADMIN — Medication 1 APPLICATION(S): at 06:01

## 2023-04-09 RX ADMIN — DORZOLAMIDE HYDROCHLORIDE TIMOLOL MALEATE 1 DROP(S): 20; 5 SOLUTION/ DROPS OPHTHALMIC at 19:20

## 2023-04-09 RX ADMIN — OXYCODONE HYDROCHLORIDE 10 MILLIGRAM(S): 5 TABLET ORAL at 12:45

## 2023-04-09 RX ADMIN — Medication 975 MILLIGRAM(S): at 07:00

## 2023-04-09 NOTE — PROGRESS NOTE ADULT - SUBJECTIVE AND OBJECTIVE BOX
Pt seen/examined. Doing well. Pain controlled. No acute overnight complaints or events.    T(C): 36.3 (04-09-23 @ 01:54), Max: 37.1 (04-08-23 @ 18:00)  HR: 59 (04-09-23 @ 01:54) (59 - 72)  BP: 111/59 (04-09-23 @ 01:54) (108/50 - 160/80)  RR: 18 (04-09-23 @ 01:54) (18 - 18)  SpO2: 100% (04-09-23 @ 01:54) (98% - 100%)  Wt(kg): --  - Gen: NAD    PHYSICAL EXAM:  nad  nonlabored resp  rue  dressing c/d/i  ace  volar bolster  wwp    46 yo M sp multiple I&D of Right hand and forearm. Stable s/p  I&D and Integra placement on 4/4    Plan:  - Pain control  - DVT ppx  - IS  - Con't Unasyn until 4/17  - Monitor BP  - Dialysis MWF with labs at dialysis  - Derm Recs: daily aquaphor application

## 2023-04-09 NOTE — PROGRESS NOTE ADULT - SUBJECTIVE AND OBJECTIVE BOX
Patient is a 47y Male whom presented to the hospital with esrd on hd    PAST MEDICAL & SURGICAL HISTORY:  ESRD on dialysis      H/O hypotension      Diabetes mellitus      S/P BKA (below knee amputation) bilateral      AV fistula          MEDICATIONS  (STANDING):  acetaminophen     Tablet .. 975 milliGRAM(s) Oral every 8 hours  ampicillin/sulbactam  IVPB      aspirin  chewable 81 milliGRAM(s) Oral daily  dextrose 5%. 1000 milliLiter(s) (50 mL/Hr) IV Continuous <Continuous>  dextrose 5%. 1000 milliLiter(s) (100 mL/Hr) IV Continuous <Continuous>  dextrose 50% Injectable 25 Gram(s) IV Push once  dextrose 50% Injectable 12.5 Gram(s) IV Push once  dextrose 50% Injectable 25 Gram(s) IV Push once  glucagon  Injectable 1 milliGRAM(s) IntraMuscular once  insulin lispro (ADMELOG) corrective regimen sliding scale   SubCutaneous three times a day before meals  lactated ringers. 1000 milliLiter(s) (100 mL/Hr) IV Continuous <Continuous>      Allergies    No Known Drug Allergies  [place] (Rash)    Intolerances        SOCIAL HISTORY:  Denies ETOh,Smoking,     FAMILY HISTORY:  No pertinent family history in first degree relatives        REVIEW OF SYSTEMS:    CONSTITUTIONAL: No weakness, fevers or chills                                                                              8.8    5.32  )-----------( 329      ( 09 Apr 2023 06:19 )             29.8       CBC Full  -  ( 09 Apr 2023 06:19 )  WBC Count : 5.32 K/uL  RBC Count : 3.32 M/uL  Hemoglobin : 8.8 g/dL  Hematocrit : 29.8 %  Platelet Count - Automated : 329 K/uL  Mean Cell Volume : 89.8 fL  Mean Cell Hemoglobin : 26.5 pg  Mean Cell Hemoglobin Concentration : 29.5 gm/dL  Auto Neutrophil # : x  Auto Lymphocyte # : x  Auto Monocyte # : x  Auto Eosinophil # : x  Auto Basophil # : x  Auto Neutrophil % : x  Auto Lymphocyte % : x  Auto Monocyte % : x  Auto Eosinophil % : x  Auto Basophil % : x      04-09    138  |  94<L>  |  68<H>  ----------------------------<  139<H>  5.4<H>   |  23  |  7.42<H>    Ca    8.5      09 Apr 2023 06:19        CAPILLARY BLOOD GLUCOSE      POCT Blood Glucose.: 145 mg/dL (09 Apr 2023 11:23)  POCT Blood Glucose.: 150 mg/dL (09 Apr 2023 07:25)  POCT Blood Glucose.: 143 mg/dL (08 Apr 2023 22:07)  POCT Blood Glucose.: 167 mg/dL (08 Apr 2023 16:44)      Vital Signs Last 24 Hrs  T(C): 36.7 (09 Apr 2023 09:21), Max: 37.1 (08 Apr 2023 18:00)  T(F): 98 (09 Apr 2023 09:21), Max: 98.7 (08 Apr 2023 18:00)  HR: 63 (09 Apr 2023 09:21) (59 - 64)  BP: 157/56 (09 Apr 2023 09:21) (111/59 - 160/80)  BP(mean): --  RR: 18 (09 Apr 2023 09:21) (18 - 18)  SpO2: 100% (09 Apr 2023 09:21) (98% - 100%)    Parameters below as of 09 Apr 2023 09:21  Patient On (Oxygen Delivery Method): room air                                   PHYSICAL EXAM:    Constitutional: NAD  HEENT: conjunctive   clear   Neck:  No JVD  Respiratory: CTAB  Cardiovascular: S1 and S2  Gastrointestinal: BS+, soft, NT/ND  Extremities: No peripheral edema ,  Prior right 3rd digit amputation.S/P BKA (below knee amputation) bilateral  Access: pos fistula

## 2023-04-09 NOTE — PROGRESS NOTE ADULT - ASSESSMENT
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 Sin/Dr. Singh) who was seen by Dr. Sin today in the office and sent in to [facility] for urgent irrigation and debridement. Patient states he missed dialysis today because he was instructed to come straight to the ED.     PAST MEDICAL & SURGICAL HISTORY:  ESRD on dialysis  H/O hypotension  Diabetes mellitus  S/P BKA (below knee amputation) bilateral  AV fistula (06 Mar 2023 17:32)      BP monitoring,continue current antihypertensive meds, low salt diet,followup with PMD in 1-2 weeks        esrd on hd , will plan for hd as order    Excess fluids and waste products will be removed from your blood; your electrolytes will be balanced; your blood pressure will be controlled.      ANEMIA PLAN:  Anemia of chronic disease:  We will continue epo aiming for a HCT of 32-36 %.   We will monitor Iron stores, B12 and RBC folate .      send blood and urine cx,serial lactate levels,monitor vitals closley,ivfs hydration,monitor urine output and renal profile,iv abx

## 2023-04-09 NOTE — PROGRESS NOTE ADULT - PROBLEM SELECTOR PLAN 6
-diabetic retinopathy.  severe proliferative diabetic retinopathy, previously on cosopt, ophtho outpt follow up   -IfA4f=9.5%  -c/w lantus 4 and ss  -c/w ASA daily  -c/w Atorvastatin 40 mg daily (started 3/31)

## 2023-04-09 NOTE — PROGRESS NOTE ADULT - SUBJECTIVE AND OBJECTIVE BOX
[facility] Division of Hospital Medicine  Rubi Sloan MD  Pager: [phone]      Patient is a 47y old  Male who presents with a chief complaint of Right forearm gas gangrene (09 Apr 2023 02:03)      SUBJECTIVE / OVERNIGHT EVENTS: Patient seen and examined. No acute events overnight. Denies itching or pain from rash, denies fevers. Feels well and has no complaints     MEDICATIONS  (STANDING):  acetaminophen     Tablet .. 975 milliGRAM(s) Oral every 8 hours  amLODIPine   Tablet 10 milliGRAM(s) Oral daily  ampicillin/sulbactam  IVPB      ampicillin/sulbactam  IVPB 3 Gram(s) IV Intermittent every 24 hours  AQUAPHOR (petrolatum Ointment) 1 Application(s) Topical daily  AQUAPHOR (petrolatum Ointment) 1 Application(s) Topical two times a day  aspirin  chewable 81 milliGRAM(s) Oral daily  atorvastatin 40 milliGRAM(s) Oral at bedtime  chlorhexidine 2% Cloths 1 Application(s) Topical <User Schedule>  collagenase Ointment 1 Application(s) Topical two times a day  dextrose 5%. 1000 milliLiter(s) (50 mL/Hr) IV Continuous <Continuous>  dextrose 5%. 1000 milliLiter(s) (100 mL/Hr) IV Continuous <Continuous>  dextrose 50% Injectable 25 Gram(s) IV Push once  dextrose 50% Injectable 12.5 Gram(s) IV Push once  dextrose 50% Injectable 25 Gram(s) IV Push once  dorzolamide 2%/timolol 0.5% Ophthalmic Solution 1 Drop(s) Both EYES two times a day  epoetin deidre-epbx (RETACRIT) Injectable 40579 Unit(s) IV Push <User Schedule>  glucagon  Injectable 1 milliGRAM(s) IntraMuscular once  insulin glargine Injectable (LANTUS) 4 Unit(s) SubCutaneous at bedtime  insulin lispro (ADMELOG) corrective regimen sliding scale   SubCutaneous at bedtime  insulin lispro (ADMELOG) corrective regimen sliding scale   SubCutaneous three times a day before meals  senna 2 Tablet(s) Oral at bedtime  Sucroferric Oxyhydroxide 2500mg Tablet 2 Tablet(s) 2 Tablet(s) Oral three times a day    MEDICATIONS  (PRN):  dextrose Oral Gel 15 Gram(s) Oral once PRN Blood Glucose LESS THAN 70 milliGRAM(s)/deciliter  oxyCODONE    IR 5 milliGRAM(s) Oral every 4 hours PRN Moderate Pain (4 - 6)  oxyCODONE    IR 10 milliGRAM(s) Oral every 4 hours PRN Severe Pain (7 - 10)      CAPILLARY BLOOD GLUCOSE      POCT Blood Glucose.: 145 mg/dL (09 Apr 2023 11:23)  POCT Blood Glucose.: 150 mg/dL (09 Apr 2023 07:25)  POCT Blood Glucose.: 143 mg/dL (08 Apr 2023 22:07)  POCT Blood Glucose.: 167 mg/dL (08 Apr 2023 16:44)    I&O's Summary      PHYSICAL EXAM:  Vital Signs Last 24 Hrs  T(C): 36.7 (09 Apr 2023 09:21), Max: 37.1 (08 Apr 2023 18:00)  T(F): 98 (09 Apr 2023 09:21), Max: 98.7 (08 Apr 2023 18:00)  HR: 63 (09 Apr 2023 09:21) (59 - 64)  BP: 157/56 (09 Apr 2023 09:21) (111/59 - 160/80)  BP(mean): --  RR: 18 (09 Apr 2023 09:21) (18 - 18)  SpO2: 100% (09 Apr 2023 09:21) (98% - 100%)    Parameters below as of 09 Apr 2023 09:21  Patient On (Oxygen Delivery Method): room air      PHYSICAL EXAM:  GENERAL: NAD, well-developed  EYES: conjunctiva and sclera clear  CHEST/LUNG: Clear to auscultation bilaterally; No wheeze  HEART: Regular rate and rhythm; No murmurs, rubs, or gallops  ABDOMEN: Soft, Nontender, Nondistended; Bowel sounds present  EXTREMITIES:  s/p bilateral BKA; R forearm ACE wrap   PSYCH: AAOx3    LABS:                        8.8    5.32  )-----------( 329      ( 09 Apr 2023 06:19 )             29.8     04-09    138  |  94<L>  |  68<H>  ----------------------------<  139<H>  5.4<H>   |  23  |  7.42<H>    Ca    8.5      09 Apr 2023 06:19      [x] Care Discussed with Consultants/Other Providers: Orthopedic PA - discussed

## 2023-04-09 NOTE — PROGRESS NOTE ADULT - PROBLEM SELECTOR PLAN 5
-renal consulted for HD - MWF  -renally dose medications   -anemia of renal disease, c/w epo during HD as per renal  -renal restricted diet w/ nepro supplements    Note- patient has appointment at [place] for renal transplant evaluation on 4/20 and requesting discharge by 4/14 to facilitate getting to this appointment - ortho team aware

## 2023-04-10 LAB
GLUCOSE BLDC GLUCOMTR-MCNC: 100 MG/DL — HIGH (ref 70–99)
GLUCOSE BLDC GLUCOMTR-MCNC: 138 MG/DL — HIGH (ref 70–99)
GLUCOSE BLDC GLUCOMTR-MCNC: 152 MG/DL — HIGH (ref 70–99)
GLUCOSE BLDC GLUCOMTR-MCNC: 190 MG/DL — HIGH (ref 70–99)
GLUCOSE BLDC GLUCOMTR-MCNC: 83 MG/DL — SIGNIFICANT CHANGE UP (ref 70–99)

## 2023-04-10 RX ADMIN — OXYCODONE HYDROCHLORIDE 10 MILLIGRAM(S): 5 TABLET ORAL at 04:50

## 2023-04-10 RX ADMIN — ATORVASTATIN CALCIUM 40 MILLIGRAM(S): 80 TABLET, FILM COATED ORAL at 22:18

## 2023-04-10 RX ADMIN — Medication 975 MILLIGRAM(S): at 17:23

## 2023-04-10 RX ADMIN — INSULIN GLARGINE 4 UNIT(S): 100 INJECTION, SOLUTION SUBCUTANEOUS at 22:19

## 2023-04-10 RX ADMIN — Medication 975 MILLIGRAM(S): at 08:08

## 2023-04-10 RX ADMIN — ERYTHROPOIETIN 10000 UNIT(S): 10000 INJECTION, SOLUTION INTRAVENOUS; SUBCUTANEOUS at 15:54

## 2023-04-10 RX ADMIN — Medication 81 MILLIGRAM(S): at 11:49

## 2023-04-10 RX ADMIN — OXYCODONE HYDROCHLORIDE 10 MILLIGRAM(S): 5 TABLET ORAL at 00:21

## 2023-04-10 RX ADMIN — OXYCODONE HYDROCHLORIDE 10 MILLIGRAM(S): 5 TABLET ORAL at 18:17

## 2023-04-10 RX ADMIN — OXYCODONE HYDROCHLORIDE 10 MILLIGRAM(S): 5 TABLET ORAL at 17:17

## 2023-04-10 RX ADMIN — Medication 975 MILLIGRAM(S): at 18:23

## 2023-04-10 RX ADMIN — Medication 2: at 11:48

## 2023-04-10 RX ADMIN — Medication 1 APPLICATION(S): at 06:32

## 2023-04-10 RX ADMIN — CHLORHEXIDINE GLUCONATE 1 APPLICATION(S): 213 SOLUTION TOPICAL at 11:52

## 2023-04-10 RX ADMIN — OXYCODONE HYDROCHLORIDE 10 MILLIGRAM(S): 5 TABLET ORAL at 07:53

## 2023-04-10 RX ADMIN — Medication 975 MILLIGRAM(S): at 07:50

## 2023-04-10 RX ADMIN — Medication 975 MILLIGRAM(S): at 00:21

## 2023-04-10 RX ADMIN — Medication 1 APPLICATION(S): at 19:28

## 2023-04-10 RX ADMIN — OXYCODONE HYDROCHLORIDE 10 MILLIGRAM(S): 5 TABLET ORAL at 03:50

## 2023-04-10 RX ADMIN — OXYCODONE HYDROCHLORIDE 10 MILLIGRAM(S): 5 TABLET ORAL at 12:49

## 2023-04-10 RX ADMIN — Medication 1 APPLICATION(S): at 11:49

## 2023-04-10 RX ADMIN — DORZOLAMIDE HYDROCHLORIDE TIMOLOL MALEATE 1 DROP(S): 20; 5 SOLUTION/ DROPS OPHTHALMIC at 19:28

## 2023-04-10 RX ADMIN — AMPICILLIN SODIUM AND SULBACTAM SODIUM 200 GRAM(S): 250; 125 INJECTION, POWDER, FOR SUSPENSION INTRAMUSCULAR; INTRAVENOUS at 22:19

## 2023-04-10 RX ADMIN — OXYCODONE HYDROCHLORIDE 10 MILLIGRAM(S): 5 TABLET ORAL at 08:08

## 2023-04-10 RX ADMIN — DORZOLAMIDE HYDROCHLORIDE TIMOLOL MALEATE 1 DROP(S): 20; 5 SOLUTION/ DROPS OPHTHALMIC at 06:32

## 2023-04-10 RX ADMIN — OXYCODONE HYDROCHLORIDE 10 MILLIGRAM(S): 5 TABLET ORAL at 13:49

## 2023-04-10 NOTE — PROGRESS NOTE ADULT - ASSESSMENT
48 yo M sp multiple I&D of Right hand and forearm. Stable s/p I&D and Integra placement on 4/4    Plan:  - Pain control  - DVT ppx  - IS  - Con't Unasyn until 4/17  - Monitor BP  - Dialysis MWF with labs at dialysis  - Dressing with adaptic, kerlex ball, aquaphor   - Derm Recs: daily aquaphor application

## 2023-04-10 NOTE — PROGRESS NOTE ADULT - SUBJECTIVE AND OBJECTIVE BOX
Patient is a 47y Male whom presented to the hospital with esrd on hd    PAST MEDICAL & SURGICAL HISTORY:  ESRD on dialysis      H/O hypotension      Diabetes mellitus      S/P BKA (below knee amputation) bilateral      AV fistula          MEDICATIONS  (STANDING):  acetaminophen     Tablet .. 975 milliGRAM(s) Oral every 8 hours  ampicillin/sulbactam  IVPB      aspirin  chewable 81 milliGRAM(s) Oral daily  dextrose 5%. 1000 milliLiter(s) (50 mL/Hr) IV Continuous <Continuous>  dextrose 5%. 1000 milliLiter(s) (100 mL/Hr) IV Continuous <Continuous>  dextrose 50% Injectable 25 Gram(s) IV Push once  dextrose 50% Injectable 12.5 Gram(s) IV Push once  dextrose 50% Injectable 25 Gram(s) IV Push once  glucagon  Injectable 1 milliGRAM(s) IntraMuscular once  insulin lispro (ADMELOG) corrective regimen sliding scale   SubCutaneous three times a day before meals  lactated ringers. 1000 milliLiter(s) (100 mL/Hr) IV Continuous <Continuous>      Allergies    No Known Drug Allergies  [place] (Rash)    Intolerances        SOCIAL HISTORY:  Denies ETOh,Smoking,     FAMILY HISTORY:  No pertinent family history in first degree relatives        REVIEW OF SYSTEMS:    CONSTITUTIONAL: No weakness, fevers or chills                                                                                                    8.8    5.32  )-----------( 329      ( 09 Apr 2023 06:19 )             29.8       CBC Full  -  ( 09 Apr 2023 06:19 )  WBC Count : 5.32 K/uL  RBC Count : 3.32 M/uL  Hemoglobin : 8.8 g/dL  Hematocrit : 29.8 %  Platelet Count - Automated : 329 K/uL  Mean Cell Volume : 89.8 fL  Mean Cell Hemoglobin : 26.5 pg  Mean Cell Hemoglobin Concentration : 29.5 gm/dL  Auto Neutrophil # : x  Auto Lymphocyte # : x  Auto Monocyte # : x  Auto Eosinophil # : x  Auto Basophil # : x  Auto Neutrophil % : x  Auto Lymphocyte % : x  Auto Monocyte % : x  Auto Eosinophil % : x  Auto Basophil % : x      04-09    138  |  94<L>  |  68<H>  ----------------------------<  139<H>  5.4<H>   |  23  |  7.42<H>    Ca    8.5      09 Apr 2023 06:19        CAPILLARY BLOOD GLUCOSE      POCT Blood Glucose.: 152 mg/dL (10 Apr 2023 11:13)  POCT Blood Glucose.: 100 mg/dL (10 Apr 2023 07:16)  POCT Blood Glucose.: 154 mg/dL (09 Apr 2023 22:16)  POCT Blood Glucose.: 122 mg/dL (09 Apr 2023 16:54)      Vital Signs Last 24 Hrs  T(C): 36.9 (10 Apr 2023 09:33), Max: 36.9 (10 Apr 2023 09:33)  T(F): 98.4 (10 Apr 2023 09:33), Max: 98.4 (10 Apr 2023 09:33)  HR: 74 (10 Apr 2023 09:33) (59 - 74)  BP: 147/72 (10 Apr 2023 09:33) (146/70 - 167/72)  BP(mean): --  RR: 18 (10 Apr 2023 09:33) (18 - 18)  SpO2: 99% (10 Apr 2023 09:33) (99% - 100%)    Parameters below as of 10 Apr 2023 09:33  Patient On (Oxygen Delivery Method): room air                       PHYSICAL EXAM:    Constitutional: NAD  HEENT: conjunctive   clear   Neck:  No JVD  Respiratory: CTAB  Cardiovascular: S1 and S2  Gastrointestinal: BS+, soft, NT/ND  Extremities: No peripheral edema ,  Prior right 3rd digit amputation.S/P BKA (below knee amputation) bilateral  Access: pos fistula

## 2023-04-10 NOTE — PROGRESS NOTE ADULT - SUBJECTIVE AND OBJECTIVE BOX
Orthopedic Progress Note     S:  No acute events overnight, pain is well controlled.  Patient denies any chest pain, SOB, N/V, fevers/chills.    T(C): 36.5 (04-09-23 @ 22:31), Max: 36.7 (04-09-23 @ 09:21)  HR: 59 (04-09-23 @ 22:31) (59 - 63)  BP: 167/72 (04-09-23 @ 22:31) (148/60 - 167/72)  RR: 18 (04-09-23 @ 22:31) (18 - 18)  SpO2: 100% (04-09-23 @ 22:31) (100% - 100%)  Wt(kg): --I&O's Summary    09 Apr 2023 07:01  -  10 Apr 2023 06:14  --------------------------------------------------------  IN: 0 mL / OUT: 0 mL / NET: 0 mL        O:  Physical exam:  Gen: Alert and Oriented x3, No Acute Distress    Right Upper EXT:            Dressing: Ace c/d/i with volar bolster            Motor: grossly in tact            Sensation: grossly in tact            Digits exposed are warm and well perfused             Labs:                        8.8    5.32  )-----------( 329      ( 09 Apr 2023 06:19 )             29.8    04-09    138  |  94<L>  |  68<H>  ----------------------------<  139<H>  5.4<H>   |  23  |  7.42<H>    Ca    8.5      09 Apr 2023 06:19

## 2023-04-10 NOTE — PROGRESS NOTE ADULT - ASSESSMENT
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 Sin/Dr. Singh) who was seen by Dr. Sin today in the office and sent in to [facility] for urgent irrigation and debridement. Patient states he missed dialysis today because he was instructed to come straight to the ED.     PAST MEDICAL & SURGICAL HISTORY:  ESRD on dialysis  H/O hypotension  Diabetes mellitus  S/P BKA (below knee amputation) bilateral  AV fistula (06 Mar 2023 17:32)      BP monitoring,continue current antihypertensive meds, low salt diet,followup with PMD in 1-2 weeks        esrd on hd , will plan for hd as order    Excess fluids and waste products will be removed from your blood; your electrolytes will be balanced; your blood pressure will be controlled.      ANEMIA PLAN:  Anemia of chronic disease:  We will continue epo aiming for a HCT of 32-36 %.   We will monitor Iron stores, B12 and RBC folate .      send blood and urine cx,serial lactate levels,monitor vitals closley,ivfs hydration,monitor urine output and renal profile,iv abx

## 2023-04-11 LAB
ANION GAP SERPL CALC-SCNC: 20 MMOL/L — HIGH (ref 7–14)
BUN SERPL-MCNC: 53 MG/DL — HIGH (ref 7–23)
CALCIUM SERPL-MCNC: 8.9 MG/DL — SIGNIFICANT CHANGE UP (ref 8.4–10.5)
CHLORIDE SERPL-SCNC: 98 MMOL/L — SIGNIFICANT CHANGE UP (ref 98–107)
CO2 SERPL-SCNC: 23 MMOL/L — SIGNIFICANT CHANGE UP (ref 22–31)
CREAT SERPL-MCNC: 6.72 MG/DL — HIGH (ref 0.5–1.3)
EGFR: 9 ML/MIN/1.73M2 — LOW
GLUCOSE BLDC GLUCOMTR-MCNC: 100 MG/DL — HIGH (ref 70–99)
GLUCOSE BLDC GLUCOMTR-MCNC: 131 MG/DL — HIGH (ref 70–99)
GLUCOSE BLDC GLUCOMTR-MCNC: 150 MG/DL — HIGH (ref 70–99)
GLUCOSE BLDC GLUCOMTR-MCNC: 150 MG/DL — HIGH (ref 70–99)
GLUCOSE BLDC GLUCOMTR-MCNC: 158 MG/DL — HIGH (ref 70–99)
GLUCOSE SERPL-MCNC: 102 MG/DL — HIGH (ref 70–99)
HAV IGM SER-ACNC: SIGNIFICANT CHANGE UP
HBV CORE IGM SER-ACNC: SIGNIFICANT CHANGE UP
HBV SURFACE AG SER-ACNC: SIGNIFICANT CHANGE UP
HCT VFR BLD CALC: 32.5 % — LOW (ref 39–50)
HCV AB S/CO SERPL IA: 0.26 S/CO — SIGNIFICANT CHANGE UP (ref 0–0.99)
HCV AB SERPL-IMP: SIGNIFICANT CHANGE UP
HGB BLD-MCNC: 9.9 G/DL — LOW (ref 13–17)
MCHC RBC-ENTMCNC: 27.3 PG — SIGNIFICANT CHANGE UP (ref 27–34)
MCHC RBC-ENTMCNC: 30.5 GM/DL — LOW (ref 32–36)
MCV RBC AUTO: 89.5 FL — SIGNIFICANT CHANGE UP (ref 80–100)
NRBC # BLD: 0 /100 WBCS — SIGNIFICANT CHANGE UP (ref 0–0)
NRBC # FLD: 0 K/UL — SIGNIFICANT CHANGE UP (ref 0–0)
PLATELET # BLD AUTO: 349 K/UL — SIGNIFICANT CHANGE UP (ref 150–400)
POTASSIUM SERPL-MCNC: 4.7 MMOL/L — SIGNIFICANT CHANGE UP (ref 3.5–5.3)
POTASSIUM SERPL-SCNC: 4.7 MMOL/L — SIGNIFICANT CHANGE UP (ref 3.5–5.3)
RBC # BLD: 3.63 M/UL — LOW (ref 4.2–5.8)
RBC # FLD: 14.9 % — HIGH (ref 10.3–14.5)
SODIUM SERPL-SCNC: 141 MMOL/L — SIGNIFICANT CHANGE UP (ref 135–145)
WBC # BLD: 5.12 K/UL — SIGNIFICANT CHANGE UP (ref 3.8–10.5)
WBC # FLD AUTO: 5.12 K/UL — SIGNIFICANT CHANGE UP (ref 3.8–10.5)

## 2023-04-11 PROCEDURE — 99232 SBSQ HOSP IP/OBS MODERATE 35: CPT

## 2023-04-11 RX ADMIN — DORZOLAMIDE HYDROCHLORIDE TIMOLOL MALEATE 1 DROP(S): 20; 5 SOLUTION/ DROPS OPHTHALMIC at 06:13

## 2023-04-11 RX ADMIN — OXYCODONE HYDROCHLORIDE 10 MILLIGRAM(S): 5 TABLET ORAL at 17:24

## 2023-04-11 RX ADMIN — Medication 975 MILLIGRAM(S): at 00:11

## 2023-04-11 RX ADMIN — Medication 1 APPLICATION(S): at 07:06

## 2023-04-11 RX ADMIN — Medication 2: at 11:33

## 2023-04-11 RX ADMIN — ATORVASTATIN CALCIUM 40 MILLIGRAM(S): 80 TABLET, FILM COATED ORAL at 21:19

## 2023-04-11 RX ADMIN — Medication 1 APPLICATION(S): at 11:32

## 2023-04-11 RX ADMIN — Medication 975 MILLIGRAM(S): at 01:00

## 2023-04-11 RX ADMIN — OXYCODONE HYDROCHLORIDE 10 MILLIGRAM(S): 5 TABLET ORAL at 07:00

## 2023-04-11 RX ADMIN — CHLORHEXIDINE GLUCONATE 1 APPLICATION(S): 213 SOLUTION TOPICAL at 11:36

## 2023-04-11 RX ADMIN — Medication 1 APPLICATION(S): at 17:24

## 2023-04-11 RX ADMIN — INSULIN GLARGINE 4 UNIT(S): 100 INJECTION, SOLUTION SUBCUTANEOUS at 23:58

## 2023-04-11 RX ADMIN — OXYCODONE HYDROCHLORIDE 10 MILLIGRAM(S): 5 TABLET ORAL at 18:24

## 2023-04-11 RX ADMIN — OXYCODONE HYDROCHLORIDE 10 MILLIGRAM(S): 5 TABLET ORAL at 01:00

## 2023-04-11 RX ADMIN — DORZOLAMIDE HYDROCHLORIDE TIMOLOL MALEATE 1 DROP(S): 20; 5 SOLUTION/ DROPS OPHTHALMIC at 17:25

## 2023-04-11 RX ADMIN — OXYCODONE HYDROCHLORIDE 10 MILLIGRAM(S): 5 TABLET ORAL at 00:11

## 2023-04-11 RX ADMIN — Medication 975 MILLIGRAM(S): at 18:24

## 2023-04-11 RX ADMIN — OXYCODONE HYDROCHLORIDE 10 MILLIGRAM(S): 5 TABLET ORAL at 10:50

## 2023-04-11 RX ADMIN — Medication 975 MILLIGRAM(S): at 10:49

## 2023-04-11 RX ADMIN — OXYCODONE HYDROCHLORIDE 10 MILLIGRAM(S): 5 TABLET ORAL at 11:50

## 2023-04-11 RX ADMIN — Medication 975 MILLIGRAM(S): at 17:24

## 2023-04-11 RX ADMIN — OXYCODONE HYDROCHLORIDE 10 MILLIGRAM(S): 5 TABLET ORAL at 06:19

## 2023-04-11 RX ADMIN — AMPICILLIN SODIUM AND SULBACTAM SODIUM 200 GRAM(S): 250; 125 INJECTION, POWDER, FOR SUSPENSION INTRAMUSCULAR; INTRAVENOUS at 21:18

## 2023-04-11 RX ADMIN — OXYCODONE HYDROCHLORIDE 10 MILLIGRAM(S): 5 TABLET ORAL at 22:15

## 2023-04-11 RX ADMIN — AMLODIPINE BESYLATE 10 MILLIGRAM(S): 2.5 TABLET ORAL at 06:12

## 2023-04-11 RX ADMIN — Medication 81 MILLIGRAM(S): at 11:33

## 2023-04-11 RX ADMIN — Medication 975 MILLIGRAM(S): at 11:49

## 2023-04-11 RX ADMIN — OXYCODONE HYDROCHLORIDE 10 MILLIGRAM(S): 5 TABLET ORAL at 21:32

## 2023-04-11 RX ADMIN — Medication 1 APPLICATION(S): at 07:05

## 2023-04-11 NOTE — CHART NOTE - NSCHARTNOTEFT_GEN_A_CORE
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      Patient on phone at time of visit, but endorses a good appetite and denies having any nutritional questions or concerns at this time. Stated he is eating with mostly good intake and consuming Nepro supplement.  Patient noted with fair to good PO intake of meals in flowsheets.     Source: [X] Patient [ ] Family [ ] RN [X] Chart      Diet, NPO after Midnight:   NPO Start Date: 03-Apr-2023,   NPO Start Time: 23:59  Except Medications (04-03-23 @ 04:16)  Diet, Consistent Carbohydrate Renal w/Evening Snack:   Supplement Feeding Modality:  Oral  Nepro Cans or Servings Per Day:  1       Frequency:  Two Times a day (03-07-23 @ 00:17)    GI: WDL.      PO intake:  [ ] Poor < 50%  [X] Mostly Fair 50-75% to Good  % [ ] Inconsistent PO intake    Enteral /Parenteral Nutrition:       [ X ] n/a    Weight: Bedscale previously noted to be malfunctioning at time of last f/u 4/3/23.      Most Current Weights:  4/10 - 65.8kg / 68.3kg      4/9 - 72.1kg / 71.8kg     Weight Hx - 3/21 - 71kg      3/20 - 68kg      3/16 - 66.8kg      3/15 - 67.4kg  Adjusted BW for B/L BKAs - 59.3kg  Patient with weight variances likely due to ESRD/HD due to fluid shifts and/or related to whether weights are obtained while wearing prosthetics.    Edema: 1+ edema to rt arm and hand    Pressure Injuries: No pressure injuries noted     _______________ Pertinent Medications_______________  MEDICATIONS  (STANDING):  acetaminophen     Tablet .. 975 milliGRAM(s) Oral every 8 hours  amLODIPine   Tablet 10 milliGRAM(s) Oral daily  ampicillin/sulbactam  IVPB      ampicillin/sulbactam  IVPB 3 Gram(s) IV Intermittent every 24 hours  AQUAPHOR (petrolatum Ointment) 1 Application(s) Topical two times a day  aspirin  chewable 81 milliGRAM(s) Oral daily  atorvastatin 40 milliGRAM(s) Oral at bedtime  collagenase Ointment 1 Application(s) Topical two times a day  dextrose 5% + sodium chloride 0.45%. 1000 milliLiter(s) (75 mL/Hr) IV Continuous <Continuous>  dextrose 5%. 1000 milliLiter(s) (50 mL/Hr) IV Continuous <Continuous>  dextrose 5%. 1000 milliLiter(s) (100 mL/Hr) IV Continuous <Continuous>  dextrose 50% Injectable 25 Gram(s) IV Push once  dextrose 50% Injectable 12.5 Gram(s) IV Push once  dextrose 50% Injectable 25 Gram(s) IV Push once  dorzolamide 2%/timolol 0.5% Ophthalmic Solution 1 Drop(s) Both EYES two times a day  epoetin deidre-epbx (RETACRIT) Injectable 77917 Unit(s) IV Push <User Schedule>  glucagon  Injectable 1 milliGRAM(s) IntraMuscular once  insulin glargine Injectable (LANTUS) 4 Unit(s) SubCutaneous at bedtime  insulin lispro (ADMELOG) corrective regimen sliding scale   SubCutaneous at bedtime  insulin lispro (ADMELOG) corrective regimen sliding scale   SubCutaneous three times a day before meals  senna 2 Tablet(s) Oral at bedtime  Sucroferric Oxyhydroxide 2500mg Tablet 2 Tablet(s) 2 Tablet(s) Oral three times a day    MEDICATIONS  (PRN):  dextrose Oral Gel 15 Gram(s) Oral once PRN Blood Glucose LESS THAN 70 milliGRAM(s)/deciliter  oxyCODONE    IR 5 milliGRAM(s) Oral every 4 hours PRN Moderate Pain (4 - 6)  oxyCODONE    IR 10 milliGRAM(s) Oral every 4 hours PRN Severe Pain (7 - 10)      __________________ Pertinent Labs__________________   04-11 Na 141 mmol/L Glu 102 mg/dL<H> K+ 4.7 mmol/L Cr 6.72 mg/dL<H> BUN 53 mg/dL<H> Phos n/a     CAPILLARY BLOOD GLUCOSE  POCT Blood Glucose.: 158 mg/dL (11 Apr 2023 11:20)  POCT Blood Glucose.: 100 mg/dL (11 Apr 2023 07:21)  POCT Blood Glucose.: 190 mg/dL (10 Apr 2023 22:23)  POCT Blood Glucose.: 138 mg/dL (10 Apr 2023 17:49)    Estimated Needs:   [X] no change since previous assessment  [ ] recalculated:     Previous Nutrition Diagnosis: Increased Nutrient Needs - kcal and protein    Nutrition Diagnosis is [X ] ongoing  [ ] resolved [ ] not applicable     New Nutrition Diagnosis: n/a    Monitoring and Evaluation:     [ x ] Tolerance to diet prescription / adequacy of meal intake  [ x ] Weight trends   [ x ] Pertinent labs    Recommendations:  1) Diet / Supplement Changes: Continue diet as ordered with Nepro 2x daily (850 kcals, 38.2g protein).   2) Obtain weights as feasible, please obtain weights without prosthetics whenever possible.    3) Please consistently document % meal intake in nursing flowsheets.     Rubi Spence, MS, RDN, CDN  Pager [phone]  Also available on MS Teams.

## 2023-04-11 NOTE — PROGRESS NOTE ADULT - PROBLEM SELECTOR PLAN 6
-diabetic retinopathy.  severe proliferative diabetic retinopathy, previously on cosopt, ophtho outpt follow up   -RcK6h=0.5%  -c/w lantus 4U + ISS. Monitor FS and adust regimen as needed   -c/w ASA daily  -c/w Atorvastatin 40 mg daily (started 3/31)  -

## 2023-04-11 NOTE — CHART NOTE - NSCHARTNOTESELECT_GEN_ALL_CORE
Follow-Up/Nutrition Services
Nutrition Services
Follow-Up/Nutrition Services
Follow-Up/Nutrition Services

## 2023-04-11 NOTE — PROGRESS NOTE ADULT - SUBJECTIVE AND OBJECTIVE BOX
Orthopedic Progress Note     S:  No acute events overnight, pain is well controlled.  Patient denies any chest pain, SOB, N/V, fevers/chills.    T(C): 36.6 (04-11-23 @ 06:07), Max: 36.9 (04-10-23 @ 09:33)  HR: 87 (04-11-23 @ 06:07) (62 - 87)  BP: 149/69 (04-11-23 @ 06:07) (136/61 - 149/69)  RR: 17 (04-11-23 @ 06:07) (17 - 18)  SpO2: 99% (04-11-23 @ 06:07) (99% - 100%)  Wt(kg): --I&O's Summary    09 Apr 2023 07:01  -  10 Apr 2023 07:00  --------------------------------------------------------  IN: 0 mL / OUT: 0 mL / NET: 0 mL    10 Apr 2023 07:01  -  11 Apr 2023 06:17  --------------------------------------------------------  IN: 400 mL / OUT: 2900 mL / NET: -2500 mL        O:  Physical exam:  Gen: Alert and Oriented x3, No Acute Distress    Right Upper EXT:            Dressing: Ace c/d/i with volar bolster            Motor: grossly in tact            Sensation: grossly in tact            Digits exposed are warm and well perfused             Labs:                        8.8    5.32  )-----------( 329      ( 09 Apr 2023 06:19 )             29.8    04-09    138  |  94<L>  |  68<H>  ----------------------------<  139<H>  5.4<H>   |  23  |  7.42<H>    Ca    8.5      09 Apr 2023 06:19

## 2023-04-11 NOTE — PROGRESS NOTE ADULT - ASSESSMENT
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 Sin/Dr. Singh) who was seen by Dr. Sin today in the office and sent in to [facility] for urgent irrigation and debridement. Patient states he missed dialysis today because he was instructed to come straight to the ED.     PAST MEDICAL & SURGICAL HISTORY:  ESRD on dialysis  H/O hypotension  Diabetes mellitus  S/P BKA (below knee amputation) bilateral  AV fistula (06 Mar 2023 17:32)      BP monitoring,continue current antihypertensive meds, low salt diet,followup with PMD in 1-2 weeks        esrd on hd , will plan for hd as order    Excess fluids and waste products will be removed from your blood; your electrolytes will be balanced; your blood pressure will be controlled.      ANEMIA PLAN:  Anemia of chronic disease:  We will continue epo aiming for a HCT of 32-36 %.   We will monitor Iron stores, B12 and RBC folate .      send blood and urine cx,serial lactate levels,monitor vitals closley,ivfs hydration,monitor urine output and renal profile,iv abx

## 2023-04-11 NOTE — PROGRESS NOTE ADULT - PROBLEM SELECTOR PLAN 5
-renal consulted for HD - MWF  -renally dose medications   -anemia of renal disease, c/w epo during HD as per renal  -renal restricted diet w/ nepro supplements    Note- patient has appointment at [facility] for renal transplant evaluation on 4/20 and requesting discharge by 4/14 to facilitate getting to this appointment - ortho team aware

## 2023-04-11 NOTE — PROGRESS NOTE ADULT - ASSESSMENT
48 yo M sp multiple I&D of Right hand and forearm. Stable s/p I&D and Integra placement on 4/4    Plan:  - Pain control  - DVT ppx  - IS  - Con't Unasyn until 4/14  - Monitor BP  - Dialysis MWF with labs at dialysis  - Dressing with adaptic, kerlex ball, aquaphor   - Derm Recs: daily aquaphor application  - Plan for discharge 4/14, will set up VNS for home wound care, no home abx

## 2023-04-11 NOTE — PROGRESS NOTE ADULT - SUBJECTIVE AND OBJECTIVE BOX
CHIEF COMPLAINT: f/u     SUBJECTIVE / OVERNIGHT EVENTS: Patient seen and examined. No acute events overnight. Pain well controlled and patient without any complaints. Reports that he needs refills of his medications on discharge.     MEDICATIONS  (STANDING):  acetaminophen     Tablet .. 975 milliGRAM(s) Oral every 8 hours  amLODIPine   Tablet 10 milliGRAM(s) Oral daily  ampicillin/sulbactam  IVPB      ampicillin/sulbactam  IVPB 3 Gram(s) IV Intermittent every 24 hours  AQUAPHOR (petrolatum Ointment) 1 Application(s) Topical daily  AQUAPHOR (petrolatum Ointment) 1 Application(s) Topical two times a day  aspirin  chewable 81 milliGRAM(s) Oral daily  atorvastatin 40 milliGRAM(s) Oral at bedtime  chlorhexidine 2% Cloths 1 Application(s) Topical <User Schedule>  collagenase Ointment 1 Application(s) Topical two times a day  dextrose 5%. 1000 milliLiter(s) (50 mL/Hr) IV Continuous <Continuous>  dextrose 5%. 1000 milliLiter(s) (100 mL/Hr) IV Continuous <Continuous>  dextrose 50% Injectable 25 Gram(s) IV Push once  dextrose 50% Injectable 12.5 Gram(s) IV Push once  dextrose 50% Injectable 25 Gram(s) IV Push once  dorzolamide 2%/timolol 0.5% Ophthalmic Solution 1 Drop(s) Both EYES two times a day  epoetin deidre-epbx (RETACRIT) Injectable 13666 Unit(s) IV Push <User Schedule>  glucagon  Injectable 1 milliGRAM(s) IntraMuscular once  insulin glargine Injectable (LANTUS) 4 Unit(s) SubCutaneous at bedtime  insulin lispro (ADMELOG) corrective regimen sliding scale   SubCutaneous at bedtime  insulin lispro (ADMELOG) corrective regimen sliding scale   SubCutaneous three times a day before meals  senna 2 Tablet(s) Oral at bedtime  Sucroferric Oxyhydroxide 2500mg Tablet 2 Tablet(s) 2 Tablet(s) Oral three times a day    MEDICATIONS  (PRN):  dextrose Oral Gel 15 Gram(s) Oral once PRN Blood Glucose LESS THAN 70 milliGRAM(s)/deciliter  oxyCODONE    IR 5 milliGRAM(s) Oral every 4 hours PRN Moderate Pain (4 - 6)  oxyCODONE    IR 10 milliGRAM(s) Oral every 4 hours PRN Severe Pain (7 - 10)      VITALS:  T(F): 97.6 (04-11-23 @ 10:00), Max: 98.3 (04-10-23 @ 18:00)  HR: 71 (04-11-23 @ 10:00) (62 - 87)  BP: 148/60 (04-11-23 @ 10:00) (136/61 - 149/69)  RR: 18 (04-11-23 @ 10:00) (17 - 18)  SpO2: 98% (04-11-23 @ 10:00)  Wt(kg): --      PHYSICAL EXAM:  GENERAL: NAD, well-developed  EYES: conjunctiva and sclera clear  CHEST/LUNG: Clear to auscultation bilaterally; No wheeze  HEART: Regular rate and rhythm; No murmurs, rubs, or gallops  ABDOMEN: Soft, Nontender, Nondistended; Bowel sounds present  EXTREMITIES:  s/p bilateral BKA; R forearm ACE wrap   PSYCH: AAOx3    LABS:              9.9                  141  | 23   | 53           5.12  >-----------< 349     ------------------------< 102                   32.5                 4.7  | 98   | 6.72                                         Ca 8.9   Mg x     Ph x              RADIOLOGY & ADDITIONAL TESTS:  Imaging Personally Reviewed: [x] Yes      [x] Care Discussed with Consultants/Other Providers: Orthopedic PA - discussed

## 2023-04-12 LAB
GLUCOSE BLDC GLUCOMTR-MCNC: 113 MG/DL — HIGH (ref 70–99)
GLUCOSE BLDC GLUCOMTR-MCNC: 117 MG/DL — HIGH (ref 70–99)
GLUCOSE BLDC GLUCOMTR-MCNC: 196 MG/DL — HIGH (ref 70–99)
GLUCOSE BLDC GLUCOMTR-MCNC: 94 MG/DL — SIGNIFICANT CHANGE UP (ref 70–99)
GLUCOSE BLDC GLUCOMTR-MCNC: 94 MG/DL — SIGNIFICANT CHANGE UP (ref 70–99)
GLUCOSE BLDC GLUCOMTR-MCNC: 99 MG/DL — SIGNIFICANT CHANGE UP (ref 70–99)

## 2023-04-12 PROCEDURE — 99232 SBSQ HOSP IP/OBS MODERATE 35: CPT

## 2023-04-12 RX ADMIN — Medication 1 APPLICATION(S): at 17:24

## 2023-04-12 RX ADMIN — Medication 975 MILLIGRAM(S): at 10:48

## 2023-04-12 RX ADMIN — Medication 1 APPLICATION(S): at 06:19

## 2023-04-12 RX ADMIN — AMLODIPINE BESYLATE 10 MILLIGRAM(S): 2.5 TABLET ORAL at 06:14

## 2023-04-12 RX ADMIN — Medication 975 MILLIGRAM(S): at 09:48

## 2023-04-12 RX ADMIN — Medication 975 MILLIGRAM(S): at 03:00

## 2023-04-12 RX ADMIN — OXYCODONE HYDROCHLORIDE 10 MILLIGRAM(S): 5 TABLET ORAL at 09:48

## 2023-04-12 RX ADMIN — Medication 975 MILLIGRAM(S): at 02:10

## 2023-04-12 RX ADMIN — DORZOLAMIDE HYDROCHLORIDE TIMOLOL MALEATE 1 DROP(S): 20; 5 SOLUTION/ DROPS OPHTHALMIC at 17:23

## 2023-04-12 RX ADMIN — INSULIN GLARGINE 4 UNIT(S): 100 INJECTION, SOLUTION SUBCUTANEOUS at 21:33

## 2023-04-12 RX ADMIN — AMPICILLIN SODIUM AND SULBACTAM SODIUM 200 GRAM(S): 250; 125 INJECTION, POWDER, FOR SUSPENSION INTRAMUSCULAR; INTRAVENOUS at 21:36

## 2023-04-12 RX ADMIN — OXYCODONE HYDROCHLORIDE 10 MILLIGRAM(S): 5 TABLET ORAL at 03:00

## 2023-04-12 RX ADMIN — DORZOLAMIDE HYDROCHLORIDE TIMOLOL MALEATE 1 DROP(S): 20; 5 SOLUTION/ DROPS OPHTHALMIC at 06:14

## 2023-04-12 RX ADMIN — Medication 1 APPLICATION(S): at 15:25

## 2023-04-12 RX ADMIN — Medication 2: at 17:23

## 2023-04-12 RX ADMIN — OXYCODONE HYDROCHLORIDE 10 MILLIGRAM(S): 5 TABLET ORAL at 15:25

## 2023-04-12 RX ADMIN — ERYTHROPOIETIN 10000 UNIT(S): 10000 INJECTION, SOLUTION INTRAVENOUS; SUBCUTANEOUS at 13:10

## 2023-04-12 RX ADMIN — Medication 81 MILLIGRAM(S): at 15:24

## 2023-04-12 RX ADMIN — OXYCODONE HYDROCHLORIDE 10 MILLIGRAM(S): 5 TABLET ORAL at 22:10

## 2023-04-12 RX ADMIN — OXYCODONE HYDROCHLORIDE 10 MILLIGRAM(S): 5 TABLET ORAL at 02:09

## 2023-04-12 RX ADMIN — CHLORHEXIDINE GLUCONATE 1 APPLICATION(S): 213 SOLUTION TOPICAL at 15:26

## 2023-04-12 RX ADMIN — OXYCODONE HYDROCHLORIDE 10 MILLIGRAM(S): 5 TABLET ORAL at 10:48

## 2023-04-12 RX ADMIN — OXYCODONE HYDROCHLORIDE 10 MILLIGRAM(S): 5 TABLET ORAL at 21:33

## 2023-04-12 RX ADMIN — Medication 975 MILLIGRAM(S): at 18:24

## 2023-04-12 RX ADMIN — Medication 975 MILLIGRAM(S): at 17:24

## 2023-04-12 RX ADMIN — OXYCODONE HYDROCHLORIDE 10 MILLIGRAM(S): 5 TABLET ORAL at 16:25

## 2023-04-12 RX ADMIN — ATORVASTATIN CALCIUM 40 MILLIGRAM(S): 80 TABLET, FILM COATED ORAL at 21:33

## 2023-04-12 NOTE — PROGRESS NOTE ADULT - SUBJECTIVE AND OBJECTIVE BOX
CHIEF COMPLAINT: f/u     SUBJECTIVE / OVERNIGHT EVENTS: Patient seen and examined. No acute events overnight. States his arm/hand feels fine and he has no complaints.     MEDICATIONS  (STANDING):  acetaminophen     Tablet .. 975 milliGRAM(s) Oral every 8 hours  amLODIPine   Tablet 10 milliGRAM(s) Oral daily  ampicillin/sulbactam  IVPB 3 Gram(s) IV Intermittent every 24 hours  ampicillin/sulbactam  IVPB      AQUAPHOR (petrolatum Ointment) 1 Application(s) Topical daily  AQUAPHOR (petrolatum Ointment) 1 Application(s) Topical two times a day  aspirin  chewable 81 milliGRAM(s) Oral daily  atorvastatin 40 milliGRAM(s) Oral at bedtime  chlorhexidine 2% Cloths 1 Application(s) Topical <User Schedule>  collagenase Ointment 1 Application(s) Topical two times a day  dextrose 5%. 1000 milliLiter(s) (50 mL/Hr) IV Continuous <Continuous>  dextrose 5%. 1000 milliLiter(s) (100 mL/Hr) IV Continuous <Continuous>  dextrose 50% Injectable 25 Gram(s) IV Push once  dextrose 50% Injectable 12.5 Gram(s) IV Push once  dextrose 50% Injectable 25 Gram(s) IV Push once  dorzolamide 2%/timolol 0.5% Ophthalmic Solution 1 Drop(s) Both EYES two times a day  epoetin deidre-epbx (RETACRIT) Injectable 54520 Unit(s) IV Push <User Schedule>  glucagon  Injectable 1 milliGRAM(s) IntraMuscular once  insulin glargine Injectable (LANTUS) 4 Unit(s) SubCutaneous at bedtime  insulin lispro (ADMELOG) corrective regimen sliding scale   SubCutaneous at bedtime  insulin lispro (ADMELOG) corrective regimen sliding scale   SubCutaneous three times a day before meals  senna 2 Tablet(s) Oral at bedtime  Sucroferric Oxyhydroxide 2500mg Tablet 2 Tablet(s) 2 Tablet(s) Oral three times a day    MEDICATIONS  (PRN):  dextrose Oral Gel 15 Gram(s) Oral once PRN Blood Glucose LESS THAN 70 milliGRAM(s)/deciliter  oxyCODONE    IR 5 milliGRAM(s) Oral every 4 hours PRN Moderate Pain (4 - 6)  oxyCODONE    IR 10 milliGRAM(s) Oral every 4 hours PRN Severe Pain (7 - 10)      VITALS:  T(F): 97.7 (04-12-23 @ 09:21), Max: 97.8 (04-11-23 @ 17:25)  HR: 65 (04-12-23 @ 09:21) (64 - 94)  BP: 123/57 (04-12-23 @ 09:21) (123/57 - 145/77)  RR: 18 (04-12-23 @ 09:21) (18 - 18)  SpO2: 100% (04-12-23 @ 09:21)  Wt(kg): --      PHYSICAL EXAM:  GENERAL: NAD, well-developed  EYES: conjunctiva and sclera clear  CHEST/LUNG: Clear to auscultation bilaterally; No wheeze  HEART: Regular rate and rhythm; No murmurs, rubs, or gallops  ABDOMEN: Soft, Nontender, Nondistended; Bowel sounds present  EXTREMITIES:  s/p bilateral BKA; R forearm ACE wrap   PSYCH: AAOx3    LABS:              9.9                  141  | 23   | 53           5.12  >-----------< 349     ------------------------< 102                   32.5                 4.7  | 98   | 6.72                                         Ca 8.9   Mg x     Ph x                      RADIOLOGY & ADDITIONAL TESTS:  Imaging Personally Reviewed: [x] Yes    [ ] Consultant(s) Notes Reviewed:  [x] Care Discussed with Consultants/Other Providers: Orthopedic PA - discussed

## 2023-04-12 NOTE — PROGRESS NOTE ADULT - SUBJECTIVE AND OBJECTIVE BOX
Patient is a 47y Male whom presented to the hospital with esrd on hd    PAST MEDICAL & SURGICAL HISTORY:  ESRD on dialysis      H/O hypotension      Diabetes mellitus      S/P BKA (below knee amputation) bilateral      AV fistula          MEDICATIONS  (STANDING):  acetaminophen     Tablet .. 975 milliGRAM(s) Oral every 8 hours  ampicillin/sulbactam  IVPB      aspirin  chewable 81 milliGRAM(s) Oral daily  dextrose 5%. 1000 milliLiter(s) (50 mL/Hr) IV Continuous <Continuous>  dextrose 5%. 1000 milliLiter(s) (100 mL/Hr) IV Continuous <Continuous>  dextrose 50% Injectable 25 Gram(s) IV Push once  dextrose 50% Injectable 12.5 Gram(s) IV Push once  dextrose 50% Injectable 25 Gram(s) IV Push once  glucagon  Injectable 1 milliGRAM(s) IntraMuscular once  insulin lispro (ADMELOG) corrective regimen sliding scale   SubCutaneous three times a day before meals  lactated ringers. 1000 milliLiter(s) (100 mL/Hr) IV Continuous <Continuous>      Allergies    No Known Drug Allergies  [place] (Rash)    Intolerances        SOCIAL HISTORY:  Denies ETOh,Smoking,     FAMILY HISTORY:  No pertinent family history in first degree relatives        REVIEW OF SYSTEMS:    CONSTITUTIONAL: No weakness, fevers or chills                                                                                                                       9.9    5.12  )-----------( 349      ( 11 Apr 2023 07:33 )             32.5       CBC Full  -  ( 11 Apr 2023 07:33 )  WBC Count : 5.12 K/uL  RBC Count : 3.63 M/uL  Hemoglobin : 9.9 g/dL  Hematocrit : 32.5 %  Platelet Count - Automated : 349 K/uL  Mean Cell Volume : 89.5 fL  Mean Cell Hemoglobin : 27.3 pg  Mean Cell Hemoglobin Concentration : 30.5 gm/dL  Auto Neutrophil # : x  Auto Lymphocyte # : x  Auto Monocyte # : x  Auto Eosinophil # : x  Auto Basophil # : x  Auto Neutrophil % : x  Auto Lymphocyte % : x  Auto Monocyte % : x  Auto Eosinophil % : x  Auto Basophil % : x      04-11    141  |  98  |  53<H>  ----------------------------<  102<H>  4.7   |  23  |  6.72<H>    Ca    8.9      11 Apr 2023 07:33        CAPILLARY BLOOD GLUCOSE      POCT Blood Glucose.: 196 mg/dL (12 Apr 2023 16:27)  POCT Blood Glucose.: 94 mg/dL (12 Apr 2023 15:17)  POCT Blood Glucose.: 113 mg/dL (12 Apr 2023 13:58)  POCT Blood Glucose.: 94 mg/dL (12 Apr 2023 10:49)  POCT Blood Glucose.: 117 mg/dL (12 Apr 2023 07:14)  POCT Blood Glucose.: 131 mg/dL (11 Apr 2023 23:58)  POCT Blood Glucose.: 150 mg/dL (11 Apr 2023 22:18)      Vital Signs Last 24 Hrs  T(C): 36.3 (12 Apr 2023 17:56), Max: 36.9 (12 Apr 2023 11:25)  T(F): 97.3 (12 Apr 2023 17:56), Max: 98.4 (12 Apr 2023 11:25)  HR: 77 (12 Apr 2023 17:56) (61 - 78)  BP: 145/76 (12 Apr 2023 17:56) (123/57 - 147/73)  BP(mean): --  RR: 18 (12 Apr 2023 17:56) (18 - 18)  SpO2: 98% (12 Apr 2023 17:56) (97% - 100%)    Parameters below as of 12 Apr 2023 17:56  Patient On (Oxygen Delivery Method): room air                         PHYSICAL EXAM:    Constitutional: NAD  HEENT: conjunctive   clear   Neck:  No JVD  Respiratory: CTAB  Cardiovascular: S1 and S2  Gastrointestinal: BS+, soft, NT/ND  Extremities: No peripheral edema ,  Prior right 3rd digit amputation.S/P BKA (below knee amputation) bilateral  Access: pos fistula

## 2023-04-12 NOTE — PROGRESS NOTE ADULT - ASSESSMENT
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 Sin/Dr. Singh) who was seen by Dr. Sin today in the office and sent in to [facility] for urgent irrigation and debridement. Patient states he missed dialysis today because he was instructed to come straight to the ED.     PAST MEDICAL & SURGICAL HISTORY:  ESRD on dialysis  H/O hypotension  Diabetes mellitus  S/P BKA (below knee amputation) bilateral  AV fistula (06 Mar 2023 17:32)      BP monitoring,continue current antihypertensive meds, low salt diet,followup with PMD in 1-2 weeks        esrd on hd , will plan for hd as order    Excess fluids and waste products will be removed from your blood; your electrolytes will be balanced; your blood pressure will be controlled.      ANEMIA PLAN:  Anemia of chronic disease:  We will continue epo aiming for a HCT of 32-36 %.   We will monitor Iron stores, B12 and RBC folate .      send blood and urine cx,serial lactate levels,monitor vitals closley,ivfs hydration,monitor urine output and renal profile,iv abx

## 2023-04-12 NOTE — PROGRESS NOTE ADULT - PROBLEM SELECTOR PLAN 6
-diabetic retinopathy.  severe proliferative diabetic retinopathy, previously on cosopt, ophtho outpt follow up   -CoY5u=1.5%  -c/w lantus 4U + ISS. Monitor FS and adust regimen as needed   -c/w ASA daily  -c/w Atorvastatin 40 mg daily (started 3/31)  -

## 2023-04-12 NOTE — PROGRESS NOTE ADULT - SUBJECTIVE AND OBJECTIVE BOX
ORTHOPAEDICS DAILY PROGRESS NOTE:       SUBJECTIVE/ROS: Seen and examined. Pain well controlled. Has been working with Pt.          MEDICATIONS  (STANDING):  acetaminophen     Tablet .. 975 milliGRAM(s) Oral every 8 hours  amLODIPine   Tablet 10 milliGRAM(s) Oral daily  ampicillin/sulbactam  IVPB      ampicillin/sulbactam  IVPB 3 Gram(s) IV Intermittent every 24 hours  AQUAPHOR (petrolatum Ointment) 1 Application(s) Topical daily  AQUAPHOR (petrolatum Ointment) 1 Application(s) Topical two times a day  aspirin  chewable 81 milliGRAM(s) Oral daily  atorvastatin 40 milliGRAM(s) Oral at bedtime  chlorhexidine 2% Cloths 1 Application(s) Topical <User Schedule>  collagenase Ointment 1 Application(s) Topical two times a day  dextrose 5%. 1000 milliLiter(s) (50 mL/Hr) IV Continuous <Continuous>  dextrose 5%. 1000 milliLiter(s) (100 mL/Hr) IV Continuous <Continuous>  dextrose 50% Injectable 25 Gram(s) IV Push once  dextrose 50% Injectable 12.5 Gram(s) IV Push once  dextrose 50% Injectable 25 Gram(s) IV Push once  dorzolamide 2%/timolol 0.5% Ophthalmic Solution 1 Drop(s) Both EYES two times a day  epoetin deidre-epbx (RETACRIT) Injectable 45515 Unit(s) IV Push <User Schedule>  glucagon  Injectable 1 milliGRAM(s) IntraMuscular once  insulin glargine Injectable (LANTUS) 4 Unit(s) SubCutaneous at bedtime  insulin lispro (ADMELOG) corrective regimen sliding scale   SubCutaneous at bedtime  insulin lispro (ADMELOG) corrective regimen sliding scale   SubCutaneous three times a day before meals  senna 2 Tablet(s) Oral at bedtime  Sucroferric Oxyhydroxide 2500mg Tablet 2 Tablet(s) 2 Tablet(s) Oral three times a day    MEDICATIONS  (PRN):  dextrose Oral Gel 15 Gram(s) Oral once PRN Blood Glucose LESS THAN 70 milliGRAM(s)/deciliter  oxyCODONE    IR 10 milliGRAM(s) Oral every 4 hours PRN Severe Pain (7 - 10)  oxyCODONE    IR 5 milliGRAM(s) Oral every 4 hours PRN Moderate Pain (4 - 6)      OBJECTIVE:    Vital Signs Last 24 Hrs  T(C): 36.3 (12 Apr 2023 06:09), Max: 36.6 (11 Apr 2023 17:25)  T(F): 97.4 (12 Apr 2023 06:09), Max: 97.8 (11 Apr 2023 17:25)  HR: 78 (12 Apr 2023 06:09) (64 - 94)  BP: 131/62 (12 Apr 2023 06:09) (131/62 - 148/60)  BP(mean): --  RR: 18 (12 Apr 2023 06:09) (18 - 18)  SpO2: 97% (12 Apr 2023 06:09) (96% - 98%)    Parameters below as of 12 Apr 2023 06:09  Patient On (Oxygen Delivery Method): room air        I&O's Detail    10 Apr 2023 07:01  -  11 Apr 2023 07:00  --------------------------------------------------------  IN:    Other (mL): 400 mL  Total IN: 400 mL    OUT:    Other (mL): 2900 mL  Total OUT: 2900 mL    Total NET: -2500 mL          Daily     Daily     LABS:                        9.9    5.12  )-----------( 349      ( 11 Apr 2023 07:33 )             32.5     04-11    141  |  98  |  53<H>  ----------------------------<  102<H>  4.7   |  23  |  6.72<H>    Ca    8.9      11 Apr 2023 07:33                    PHYSICAL EXAM:  Physical exam:  Gen: Alert and Oriented x3, No Acute Distress    Right Upper EXT:            Dressing: Ace c/d/i with volar bolster            Motor: grossly in tact            Sensation: grossly in tact            Digits exposed are warm and well perfused

## 2023-04-12 NOTE — PROGRESS NOTE ADULT - PROBLEM SELECTOR PLAN 5
-renal consulted for HD - MWF  -renally dose medications   -anemia of renal disease, c/w epo during HD as per renal  -renal restricted diet w/ nepro supplements    Note- patient has appointment at [facility] for renal transplant evaluation on 4/20 and requesting discharge by 4/14 to facilitate getting to this appointment - ortho team aware

## 2023-04-12 NOTE — PROGRESS NOTE ADULT - ASSESSMENT
46 yo M sp multiple I&D of Right hand and forearm. Stable s/p I&D and Integra placement on 4/4    Plan:  - Pain control  - DVT ppx  - IS  - Continue Unasyn until 4/14  - Monitor BP  - Dialysis MWF with labs at dialysis  - Dressing with adaptic, kerlex ball, aquaphor   - Derm Recs: daily aquaphor application  - Plan for discharge 4/14, will set up VNS for home wound care, no home abx

## 2023-04-12 NOTE — PROGRESS NOTE ADULT - ATTENDING COMMENTS
Agree with above
agree with above, plan for surgery next week for integra versus skin graft placement
agree with above
agree with above. Decreased errythema from previously. Improved granulation tissue. No purulence
agree, plan for discharge Friday so patient can attend outpatient appointment for transplant evaluation. Will continue with close outpatient monitoring

## 2023-04-13 LAB
GLUCOSE BLDC GLUCOMTR-MCNC: 106 MG/DL — HIGH (ref 70–99)
GLUCOSE BLDC GLUCOMTR-MCNC: 142 MG/DL — HIGH (ref 70–99)
GLUCOSE BLDC GLUCOMTR-MCNC: 155 MG/DL — HIGH (ref 70–99)
GLUCOSE BLDC GLUCOMTR-MCNC: 170 MG/DL — HIGH (ref 70–99)
SARS-COV-2 RNA SPEC QL NAA+PROBE: SIGNIFICANT CHANGE UP

## 2023-04-13 PROCEDURE — 99232 SBSQ HOSP IP/OBS MODERATE 35: CPT

## 2023-04-13 RX ORDER — SIMETHICONE 80 MG/1
80 TABLET, CHEWABLE ORAL DAILY
Refills: 0 | Status: DISCONTINUED | OUTPATIENT
Start: 2023-04-13 | End: 2023-04-14

## 2023-04-13 RX ADMIN — Medication 975 MILLIGRAM(S): at 03:05

## 2023-04-13 RX ADMIN — AMLODIPINE BESYLATE 10 MILLIGRAM(S): 2.5 TABLET ORAL at 05:49

## 2023-04-13 RX ADMIN — OXYCODONE HYDROCHLORIDE 10 MILLIGRAM(S): 5 TABLET ORAL at 14:12

## 2023-04-13 RX ADMIN — Medication 975 MILLIGRAM(S): at 10:11

## 2023-04-13 RX ADMIN — Medication 1 APPLICATION(S): at 14:13

## 2023-04-13 RX ADMIN — Medication 1 APPLICATION(S): at 18:24

## 2023-04-13 RX ADMIN — Medication 2: at 11:45

## 2023-04-13 RX ADMIN — OXYCODONE HYDROCHLORIDE 10 MILLIGRAM(S): 5 TABLET ORAL at 19:23

## 2023-04-13 RX ADMIN — OXYCODONE HYDROCHLORIDE 10 MILLIGRAM(S): 5 TABLET ORAL at 10:12

## 2023-04-13 RX ADMIN — OXYCODONE HYDROCHLORIDE 10 MILLIGRAM(S): 5 TABLET ORAL at 15:12

## 2023-04-13 RX ADMIN — OXYCODONE HYDROCHLORIDE 10 MILLIGRAM(S): 5 TABLET ORAL at 18:23

## 2023-04-13 RX ADMIN — OXYCODONE HYDROCHLORIDE 10 MILLIGRAM(S): 5 TABLET ORAL at 22:56

## 2023-04-13 RX ADMIN — Medication 975 MILLIGRAM(S): at 02:13

## 2023-04-13 RX ADMIN — ATORVASTATIN CALCIUM 40 MILLIGRAM(S): 80 TABLET, FILM COATED ORAL at 22:11

## 2023-04-13 RX ADMIN — Medication 975 MILLIGRAM(S): at 19:23

## 2023-04-13 RX ADMIN — Medication 975 MILLIGRAM(S): at 18:23

## 2023-04-13 RX ADMIN — OXYCODONE HYDROCHLORIDE 10 MILLIGRAM(S): 5 TABLET ORAL at 02:13

## 2023-04-13 RX ADMIN — CHLORHEXIDINE GLUCONATE 1 APPLICATION(S): 213 SOLUTION TOPICAL at 10:12

## 2023-04-13 RX ADMIN — OXYCODONE HYDROCHLORIDE 10 MILLIGRAM(S): 5 TABLET ORAL at 03:05

## 2023-04-13 RX ADMIN — INSULIN GLARGINE 4 UNIT(S): 100 INJECTION, SOLUTION SUBCUTANEOUS at 22:10

## 2023-04-13 RX ADMIN — AMPICILLIN SODIUM AND SULBACTAM SODIUM 200 GRAM(S): 250; 125 INJECTION, POWDER, FOR SUSPENSION INTRAMUSCULAR; INTRAVENOUS at 22:11

## 2023-04-13 RX ADMIN — Medication 1 APPLICATION(S): at 05:49

## 2023-04-13 RX ADMIN — DORZOLAMIDE HYDROCHLORIDE TIMOLOL MALEATE 1 DROP(S): 20; 5 SOLUTION/ DROPS OPHTHALMIC at 05:48

## 2023-04-13 RX ADMIN — DORZOLAMIDE HYDROCHLORIDE TIMOLOL MALEATE 1 DROP(S): 20; 5 SOLUTION/ DROPS OPHTHALMIC at 18:23

## 2023-04-13 RX ADMIN — SENNA PLUS 2 TABLET(S): 8.6 TABLET ORAL at 22:11

## 2023-04-13 RX ADMIN — Medication 975 MILLIGRAM(S): at 11:11

## 2023-04-13 RX ADMIN — Medication 81 MILLIGRAM(S): at 11:45

## 2023-04-13 RX ADMIN — OXYCODONE HYDROCHLORIDE 10 MILLIGRAM(S): 5 TABLET ORAL at 11:11

## 2023-04-13 NOTE — PROGRESS NOTE ADULT - PROBLEM SELECTOR PLAN 1
Recurrent infections requiring irrigations and debridements.   - Initially MRI hand on 3/14 not c/f OM and so ID recommended abx through 3/22, repeat MRI 3/16 with evidence of OM, and so abx course extended as below  - s/p repeat I&D 3/16  - c/w Unasyn through 4/14 per ortho team  - S/p OR for I&D and integra placement on 4/4  - pain control with oxycodone IR prn and Tylenol tid  - DVT ppx ASA 81 per ortho  - bowel regimen - senna  - wound care per primary team
-recurrent infections requiring irrigations and debridements.   -s/p urgent irrigation and debridement as above  -ID on board and following:     --f/u BCx (3/6) - NGT    --no OR Cx sent    --MRSA PCR negative   -c/w Unasyn; can d/c vanco per ID  -f/u MRI right forearm [ ]  -pain control with oxycodone IR prn and tylenol tid  -rest of care as per orthopedic, ID and vascular teams  -encouraged nursing to do daily photo journaling at time of dressing change with patient's iphone to trend progress
Recurrent infections requiring irrigations and debridements.   - Initially MRI hand on 3/14 not c/f OM and so ID recommended abx through 3/22, repeat MRI 3/16 with evidence of OM, and so abx course extended as below  - s/p repeat I&D 3/16  - c/w Unasyn through 4/17 per ortho team  - S/p OR today for I&D and integra placement on 4/4  - pain control with oxycodone IR prn and Tylenol tid  - DVT ppx ASA 81 per ortho  - bowel regimen - senna  - wound care per primary team
-recurrent infections requiring irrigations and debridements.   - repeat MRI of hand on 3/16 with evidence of osteomyelitis,  -s/p repeat I&D 3/16  -c/w Unasyn through 3/22 per ID  - pain control with oxycodone IR prn and tylenol tid  -rest of care as per orthopedic, ID and vascular teams
-recurrent infections requiring irrigations and debridements.   -s/p urgent irrigation and debridement as above  -ID on board and following:     --f/u BCx (3/6) - NGT    --no OR Cx sent    --MRSA PCR negative   -c/w Unasyn; can d/c vanco per ID  -f/u MRI right forearm  -pain control with oxycodone IR prn and tylenol tid  -rest of care as per orthopedic, ID and vascular teams
-recurrent infections requiring irrigations and debridements.   - repeat MRI of hand on 3/16 with evidence of osteomyelitis,  -s/p repeat I&D 3/16  -c/w Unasyn through 3/22 per ID  - pain control with oxycodone IR prn and tylenol tid  - care as per orthopedic, ID and vascular teams  - wound VAC removed.  - monitor wound care as per primary team.
-recurrent infections requiring irrigations and debridements.   -s/p urgent irrigation and debridement as above  -ID on board and following:     --f/u BCx (3/6) - NGT    --no OR Cx sent    --MRSA PCR negative   -c/w Unasyn; can d/c vanco per ID  -f/u MRI right forearm  -pain control with oxycodone IR prn and tylenol tid  -rest of care as per orthopedic, ID and vascular teams
Recurrent infections requiring irrigations and debridements.   - Initially MRI hand on 3/14 not c/f OM and so ID recommended abx through 3/22, repeat MRI 3/16 with evidence of OM, and so abx course extended as below  - s/p repeat I&D 3/16  - c/w Unasyn through 4/17 per ortho team  - S/p OR for I&D and integra placement on 4/4  - pain control with oxycodone IR prn and Tylenol tid  - DVT ppx ASA 81 per ortho  - bowel regimen - senna  - wound care per primary team
-recurrent infections requiring irrigations and debridements.   -s/p urgent irrigation and debridement as above  -ID on board and following:     --f/u BCx and any OR Cx if sent ?    --check MRSA PCR  -c/w Unasyn and vanco 500mg (post HD)   -f/u MRI right forearm  -pain control with oxycodone IR prn and tylenol tid  -rest of care as per ortho.
-recurrent infections requiring irrigations and debridements.   -s/p urgent irrigation and debridement as above  -ID on board and following:     --f/u BCx (3/6) - NGT    --no OR Cx sent    --MRSA PCR negative   -c/w Unasyn; can d/c vanco per ID  -f/u MRI right forearm [ ]  -pain control with oxycodone IR prn and tylenol tid  -rest of care as per orthopedic, ID and vascular teams  -encouraged nursing to do daily photo journaling at time of dressing change with patient's iphone to trend progress
-recurrent infections requiring irrigations and debridements.   -s/p urgent irrigation and debridement as above  -ID on board and following:     --f/u BCx (3/6) - NGT    --no OR Cx sent    --MRSA PCR negative   -c/w Unasyn; can d/c vanco per ID  -f/u MRI right hand [ ]  -pain control with oxycodone IR prn and tylenol tid  -rest of care as per orthopedic, ID and vascular teams  -per vascular team, "AVF duplex reviewed and wounds improving....No acute vascular surgery intervention."  -patient to f/u with Dr. Palmer at discharge  -most likely plan for OR on thursday per orthopedic team - f/u their recs
-recurrent infections requiring irrigations and debridements.   - repeat MRI of hand on 3/16 with evidence of osteomyelitis,  -s/p repeat I&D 3/16  -c/w Unasyn through 3/22 per ID  - pain control with oxycodone IR prn and tylenol tid  - care as per orthopedic, ID and vascular teams  - wound VAC removed.  - monitor wound care as per primary team.
Recurrent infections requiring irrigations and debridements.   - Initially MRI hand on 3/14 not c/f OM and so ID recommended abx through 3/22, repeat MRI 3/16 with evidence of OM, and so abx course extended as below  - s/p repeat I&D 3/16  - c/w Unasyn through 4/17 per ortho team  - S/p OR for I&D and integra placement on 4/4  - pain control with oxycodone IR prn and Tylenol tid  - DVT ppx ASA 81 per ortho  - bowel regimen - senna  - wound care per primary team
-Pt planned for urgent irrigation and debridement   -CRI = 2 (Class III) connoting 10.1% 30-day risk of major adverse cardiac event.   -Pt is without acute cardiac condition and tolerated previous I&Ds. Patient has no signs or symptoms of ACS/decompensated HF. Most recent TTE 1/13 showed EF 62%, nl LV function, mild MS.   -would benefit from HD today prior to OR   -f/u repeat BMP reviewed, pt medically optimized to proceed to OR
-recurrent infections requiring irrigations and debridements.   -initially MRI hand on 3/14 not c/f OM and so ID recommended abx through 3/22, repeat MRI 3/16 with evidence of OM, and so abx course extended as below  -s/p repeat I&D 3/16  -c/w Unasyn through 4/17 per ortho team  -planning for OR on 4/4 for I&D and integra placement  - pain control with oxycodone IR prn and Tylenol tid  - DVT ppx ASA 81 per ortho  - bowel regimen - senna  - wound care per primary team
Recurrent infections requiring irrigations and debridements.   - Initially MRI hand on 3/14 not c/f OM and so ID recommended abx through 3/22, repeat MRI 3/16 with evidence of OM, and so abx course extended as below  - s/p repeat I&D 3/16  - c/w Unasyn through 4/14 per ortho team  - S/p OR for I&D and integra placement on 4/4  - pain control with oxycodone IR prn and Tylenol tid  - DVT ppx ASA 81 per ortho  - bowel regimen - senna  - wound care per primary team
-recurrent infections requiring irrigations and debridements.   - repeat MRI of hand on 3/16 with evidence of osteomyelitis,  -s/p repeat I&D 3/16  -c/w Unasyn per ID  - pain control with oxycodone IR prn and tylenol tid  -rest of care as per orthopedic, ID and vascular teams
-recurrent infections requiring irrigations and debridements.   -initially MRI hand on 3/14 not c/f OM and so ID recommended abx through 3/22, repeat MRI 3/16 with evidence of OM, and so abx course extended as below  -s/p repeat I&D 3/16  -c/w Unasyn through 4/17 per ortho team  -planning for OR on 4/4 for I&D and integra placement  - pain control with oxycodone IR prn and tylenol tid  - DVT ppx ASA 81 per ortho  - bowel regimen - senna  - wound care per primary team
-recurrent infections requiring irrigations and debridements.   -s/p urgent irrigation and debridement as above  -ID on board and following:     --f/u BCx (3/6) - NGT    --no OR Cx sent    --MRSA PCR negative   -c/w Unasyn; can d/c vanco per ID  -f/u MRI right forearm [ ]  -pain control with oxycodone IR prn and tylenol tid  -rest of care as per orthopedic, ID and vascular teams  -per vascular team, "AVF duplex reviewed and wounds improving....No acute vascular surgery intervention."  -patient to f/u with Dr. Palmer at discharge
Recurrent infections requiring irrigations and debridements.   - Initially MRI hand on 3/14 not c/f OM and so ID recommended abx through 3/22, repeat MRI 3/16 with evidence of OM, and so abx course extended as below  - s/p repeat I&D 3/16  - c/w Unasyn through 4/17 per ortho team  - planning for OR today for I&D and integra placement  - pain control with oxycodone IR prn and Tylenol tid  - DVT ppx ASA 81 per ortho  - bowel regimen - senna  - wound care per primary team
-Pt planned for urgent irrigation and debridement   -CRI = 2 (Class III) connoting 10.1% 30-day risk of major adverse cardiac event.   -Pt is without acute cardiac condition and tolerated previous I&Ds. Patient has no signs or symptoms of ACS/decompensated HF. Most recent TTE 1/13 showed EF 62%, nl LV function, mild MS.   -would benefit from HD today prior to OR   -f /u repeat BMP reviewed, pt medically optimized to proceed to OR
-recurrent infections requiring irrigations and debridements.   -initially MRI hand on 3/14 not c/f OM and so ID recommended abx through 3/22, repeat MRI 3/16 with evidence of OM, and so abx course extended as below  -s/p repeat I&D 3/16  -c/w Unasyn through 4/17  - pain control with oxycodone IR prn and tylenol tid  - care as per orthopedic, ID and vascular teams  - wound VAC removed.  - monitor wound care as per primary team.
Recurrent infections requiring irrigations and debridements.   - Initially MRI hand on 3/14 not c/f OM and so ID recommended abx through 3/22, repeat MRI 3/16 with evidence of OM, and so abx course extended as below  - s/p repeat I&D 3/16  - c/w Unasyn through 4/17 per ortho team  - S/p OR today for I&D and integra placement on 4/4  - pain control with oxycodone IR prn and Tylenol tid  - DVT ppx ASA 81 per ortho  - bowel regimen - senna  - wound care per primary team
-recurrent infections requiring irrigations and debridements.   -initially MRI hand on 3/14 not c/f OM and so ID recommended abx through 3/22, repeat MRI 3/16 with evidence of OM, and so abx course extended as below  -s/p repeat I&D 3/16  -c/w Unasyn through 4/17 per ortho team  - pain control with oxycodone IR prn and tylenol tid  - care as per orthopedic, ID and vascular teams  - wound VAC removed.  - monitor wound care as per primary team.
-recurrent infections requiring irrigations and debridements.   -s/p urgent irrigation and debridement as above  -ID on board and following:     --f/u BCx (3/6) - NGT    --no OR Cx sent ?    --MRSA PCR positive from Jan 2023; f/u nose culture (in lab)  -c/w Unasyn and vanco 500mg (post HD)   -f/u MRI right forearm  -pain control with oxycodone IR prn and tylenol tid  -rest of care as per orthopedic and ID teams
-recurrent infections requiring irrigations and debridements.   -initially MRI hand on 3/14 not c/f OM and so ID recommended abx through 3/22, repeat MRI 3/16 with evidence of OM, and so abx course extended as below  -s/p repeat I&D 3/16  -c/w Unasyn through 4/17 per ortho team  - pain control with oxycodone IR prn and tylenol tid  - care as per orthopedic, ID and vascular teams  - wound VAC removed.  - monitor wound care as per primary team.
-recurrent infections requiring irrigations and debridements.   - repeat MRI of hand on 3/16 with evidence of osteomyelitis,  -s/p repeat I&D 3/16  -c/w Unasyn through 3/22 per ID  - pain control with oxycodone IR prn and tylenol tid  - care as per orthopedic, ID and vascular teams  - wound VAC removed.  - monitor wound care as per primary team.
-recurrent infections requiring irrigations and debridements.   - repeat MRI of hand on 3/16 with evidence of osteomyelitis,  -s/p repeat I&D 3/16  -c/w Unasyn through 3/22 per ID  - pain control with oxycodone IR prn and tylenol tid  -rest of care as per orthopedic, ID and vascular teams
-recurrent infections requiring irrigations and debridements.   -initially MRI hand on 3/14 not c/f OM and so ID recommended abx through 3/22, repeat MRI 3/16 with evidence of OM, and so abx course extended as below  -s/p repeat I&D 3/16  -c/w Unasyn through 4/17 per ortho team  -planning for OR on 4/4 for I&D and integra placement  - pain control with oxycodone IR prn and tylenol tid  - DVT ppx ASA 81 per ortho  - bowel regimen - senna  - wound care per primary team
-recurrent infections requiring irrigations and debridements.   -initially MRI hand on 3/14 not c/f OM and so ID recommended abx through 3/22, repeat MRI 3/16 with evidence of OM, and so abx course extended as below  -s/p repeat I&D 3/16  -c/w Unasyn through 4/17 per ortho team  -planning for OR on 4/4 for I&D and integra placement  - pain control with oxycodone IR prn and tylenol tid  - bowel regimen - senna  - care as per orthopedic, ID and vascular teams  - wound care per primary team
Recurrent infections requiring irrigations and debridements.   - Initially MRI hand on 3/14 not c/f OM and so ID recommended abx through 3/22, repeat MRI 3/16 with evidence of OM, and so abx course extended as below  - s/p repeat I&D 3/16  - c/w Unasyn through 4/14 per ortho team  - S/p OR for I&D and integra placement on 4/4  - pain control with oxycodone IR prn and Tylenol tid  - DVT ppx ASA 81 per ortho  - bowel regimen - senna  - wound care per primary team
-recurrent infections requiring irrigations and debridements.   - repeat MRI of hand on 3/16 with evidence of osteomyelitis,  -s/p repeat I&D 3/16  -c/w Unasyn through 3/22 per ID  - pain control with oxycodone IR prn and tylenol tid  - care as per orthopedic, ID and vascular teams  - wound VAC removed.
Recurrent infections requiring irrigations and debridements.   - Initially MRI hand on 3/14 not c/f OM and so ID recommended abx through 3/22, repeat MRI 3/16 with evidence of OM, and so abx course extended as below  - s/p repeat I&D 3/16  - c/w Unasyn through 4/17 per ortho team  - S/p OR for I&D and integra placement on 4/4  - pain control with oxycodone IR prn and Tylenol tid  - DVT ppx ASA 81 per ortho  - bowel regimen - senna  - wound care per primary team
-recurrent infections requiring irrigations and debridements.   -initially MRI hand on 3/14 not c/f OM and so ID recommended abx through 3/22, repeat MRI 3/16 with evidence of OM, and so abx course extended as below  -s/p repeat I&D 3/16  -c/w Unasyn through 4/17 per ortho team  -planning for OR on 4/4 for I&D and integra placement  - pain control with oxycodone IR prn and tylenol tid  - DVT ppx ASA 81 per ortho  - bowel regimen - senna  - wound care per primary team

## 2023-04-13 NOTE — PROGRESS NOTE ADULT - ASSESSMENT
48 yo M sp multiple I&D of Right hand and forearm. Stable s/p I&D and Integra placement on 4/4    Plan:  - Pain control  - DVT ppx  - IS  - Continue Unasyn until 4/14  - Monitor BP  - Dialysis MWF with labs at dialysis  - Dressing with adaptic, kerlex, aquaphor   - Derm Recs: daily aquaphor application  - Plan for discharge 4/14, will set up VNS for home wound care, no home abx     Orthopaedic Surgery  [facility] [place] [id number]  [facility]        [id number]  [facility]  [id number]/ [phone]

## 2023-04-13 NOTE — PROGRESS NOTE ADULT - SUBJECTIVE AND OBJECTIVE BOX
Patient is a 47y Male whom presented to the hospital with esrd on hd    PAST MEDICAL & SURGICAL HISTORY:  ESRD on dialysis      H/O hypotension      Diabetes mellitus      S/P BKA (below knee amputation) bilateral      AV fistula          MEDICATIONS  (STANDING):  acetaminophen     Tablet .. 975 milliGRAM(s) Oral every 8 hours  ampicillin/sulbactam  IVPB      aspirin  chewable 81 milliGRAM(s) Oral daily  dextrose 5%. 1000 milliLiter(s) (50 mL/Hr) IV Continuous <Continuous>  dextrose 5%. 1000 milliLiter(s) (100 mL/Hr) IV Continuous <Continuous>  dextrose 50% Injectable 25 Gram(s) IV Push once  dextrose 50% Injectable 12.5 Gram(s) IV Push once  dextrose 50% Injectable 25 Gram(s) IV Push once  glucagon  Injectable 1 milliGRAM(s) IntraMuscular once  insulin lispro (ADMELOG) corrective regimen sliding scale   SubCutaneous three times a day before meals  lactated ringers. 1000 milliLiter(s) (100 mL/Hr) IV Continuous <Continuous>      Allergies    No Known Drug Allergies  [place] (Rash)    Intolerances        SOCIAL HISTORY:  Denies ETOh,Smoking,     FAMILY HISTORY:  No pertinent family history in first degree relatives        REVIEW OF SYSTEMS:    CONSTITUTIONAL: No weakness, fevers or chills                                                                                                                                       CAPILLARY BLOOD GLUCOSE      POCT Blood Glucose.: 142 mg/dL (13 Apr 2023 16:46)  POCT Blood Glucose.: 170 mg/dL (13 Apr 2023 11:31)  POCT Blood Glucose.: 106 mg/dL (13 Apr 2023 07:16)  POCT Blood Glucose.: 99 mg/dL (12 Apr 2023 21:26)      Vital Signs Last 24 Hrs  T(C): 36.4 (13 Apr 2023 17:47), Max: 37.2 (13 Apr 2023 09:34)  T(F): 97.5 (13 Apr 2023 17:47), Max: 98.9 (13 Apr 2023 09:34)  HR: 65 (13 Apr 2023 17:47) (64 - 72)  BP: 124/61 (13 Apr 2023 17:47) (113/60 - 149/68)  BP(mean): --  RR: 18 (13 Apr 2023 17:47) (18 - 18)  SpO2: 99% (13 Apr 2023 17:47) (99% - 100%)    Parameters below as of 13 Apr 2023 17:47  Patient On (Oxygen Delivery Method): room air                     PHYSICAL EXAM:    Constitutional: NAD  HEENT: conjunctive   clear   Neck:  No JVD  Respiratory: CTAB  Cardiovascular: S1 and S2  Gastrointestinal: BS+, soft, NT/ND  Extremities: No peripheral edema ,  Prior right 3rd digit amputation.S/P BKA (below knee amputation) bilateral  Access: pos fistula

## 2023-04-13 NOTE — PROGRESS NOTE ADULT - PROBLEM SELECTOR PROBLEM 7
Need for prophylactic measure
Diabetes mellitus
Need for prophylactic measure

## 2023-04-13 NOTE — PROGRESS NOTE ADULT - PROBLEM SELECTOR PLAN 6
-diabetic retinopathy.  severe proliferative diabetic retinopathy, previously on cosopt, ophtho outpt follow up   -OlW4s=0.5%  -c/w lantus 4U + ISS. Monitor FS and adust regimen as needed   -c/w ASA daily  -c/w Atorvastatin 40 mg daily (started 3/31)  -

## 2023-04-13 NOTE — PROGRESS NOTE ADULT - SUBJECTIVE AND OBJECTIVE BOX
ORTHOPAEDICS DAILY PROGRESS NOTE:       SUBJECTIVE/ROS: Seen and examined. Pain well controlled. States that Dr. Sin stopped by yesterday to look at the wound, which just got cleaned by RN 5 min ago           Vital Signs Last 24 Hrs  T(C): 36.5 (13 Apr 2023 05:45), Max: 37.1 (13 Apr 2023 01:26)  T(F): 97.7 (13 Apr 2023 05:45), Max: 98.7 (13 Apr 2023 01:26)  HR: 65 (13 Apr 2023 05:45) (61 - 77)  BP: 138/72 (13 Apr 2023 05:45) (123/57 - 149/68)  BP(mean): --  RR: 18 (13 Apr 2023 05:45) (18 - 18)  SpO2: 99% (13 Apr 2023 05:45) (98% - 100%)    Parameters below as of 13 Apr 2023 05:45  Patient On (Oxygen Delivery Method): room air        PHYSICAL EXAM:  Physical exam:  Gen: Alert and Oriented x3, No Acute Distress    Right Upper EXT:            Dressing: Ace c/d/i  (adaptic on volar wound)            Motor: grossly in tact            Sensation: grossly in tact            Digits exposed are warm and well perfused

## 2023-04-13 NOTE — PROGRESS NOTE ADULT - PROBLEM SELECTOR PLAN 5
-renal consulted for HD - MWF  -renally dose medications   -anemia of renal disease, c/w epo during HD as per renal  -renal restricted diet w/ nepro supplements    Note- patient has appointment at [facility] for renal transplant evaluation on 4/20 and requesting discharge by 4/14 to facilitate getting to this appointment - ortho team aware

## 2023-04-13 NOTE — PROGRESS NOTE ADULT - SUBJECTIVE AND OBJECTIVE BOX
CHIEF COMPLAINT: f/u     SUBJECTIVE / OVERNIGHT EVENTS: Patient seen and examined. No acute events overnight. Reports he feels well and is getting ready to go home tomorrow. Has HD first session tomorrow.     MEDICATIONS  (STANDING):  acetaminophen     Tablet .. 975 milliGRAM(s) Oral every 8 hours  amLODIPine   Tablet 10 milliGRAM(s) Oral daily  ampicillin/sulbactam  IVPB 3 Gram(s) IV Intermittent every 24 hours  ampicillin/sulbactam  IVPB      AQUAPHOR (petrolatum Ointment) 1 Application(s) Topical daily  AQUAPHOR (petrolatum Ointment) 1 Application(s) Topical two times a day  aspirin  chewable 81 milliGRAM(s) Oral daily  atorvastatin 40 milliGRAM(s) Oral at bedtime  chlorhexidine 2% Cloths 1 Application(s) Topical <User Schedule>  collagenase Ointment 1 Application(s) Topical two times a day  dextrose 5%. 1000 milliLiter(s) (50 mL/Hr) IV Continuous <Continuous>  dextrose 5%. 1000 milliLiter(s) (100 mL/Hr) IV Continuous <Continuous>  dextrose 50% Injectable 25 Gram(s) IV Push once  dextrose 50% Injectable 12.5 Gram(s) IV Push once  dextrose 50% Injectable 25 Gram(s) IV Push once  dorzolamide 2%/timolol 0.5% Ophthalmic Solution 1 Drop(s) Both EYES two times a day  epoetin deidre-epbx (RETACRIT) Injectable 21114 Unit(s) IV Push <User Schedule>  glucagon  Injectable 1 milliGRAM(s) IntraMuscular once  insulin glargine Injectable (LANTUS) 4 Unit(s) SubCutaneous at bedtime  insulin lispro (ADMELOG) corrective regimen sliding scale   SubCutaneous at bedtime  insulin lispro (ADMELOG) corrective regimen sliding scale   SubCutaneous three times a day before meals  senna 2 Tablet(s) Oral at bedtime  Sucroferric Oxyhydroxide 2500mg Tablet 2 Tablet(s) 2 Tablet(s) Oral three times a day    MEDICATIONS  (PRN):  dextrose Oral Gel 15 Gram(s) Oral once PRN Blood Glucose LESS THAN 70 milliGRAM(s)/deciliter  oxyCODONE    IR 10 milliGRAM(s) Oral every 4 hours PRN Severe Pain (7 - 10)  oxyCODONE    IR 5 milliGRAM(s) Oral every 4 hours PRN Moderate Pain (4 - 6)      VITALS:  T(F): 98.9 (04-13-23 @ 09:34), Max: 98.9 (04-13-23 @ 09:34)  HR: 71 (04-13-23 @ 09:34) (64 - 77)  BP: 145/66 (04-13-23 @ 09:34) (138/72 - 149/68)  RR: 18 (04-13-23 @ 09:34) (18 - 18)  SpO2: 100% (04-13-23 @ 09:34)  Wt(kg): --      PHYSICAL EXAM:  GENERAL: NAD, well-developed  EYES: conjunctiva and sclera clear  CHEST/LUNG: Clear to auscultation bilaterally; No wheeze  HEART: Regular rate and rhythm; No murmurs, rubs, or gallops  ABDOMEN: Soft, Nontender, Nondistended; Bowel sounds present  EXTREMITIES:  s/p bilateral BKA; R forearm ACE wrap   PSYCH: AAOx3    LABS:    [x] Care Discussed with Consultants/Other Providers: Orthopedic PA - discussed

## 2023-04-13 NOTE — PROGRESS NOTE ADULT - ASSESSMENT
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 Sin/Dr. Singh) who was seen by Dr. Sin today in the office and sent in to [facility] for urgent irrigation and debridement. Patient states he missed dialysis today because he was instructed to come straight to the ED.     PAST MEDICAL & SURGICAL HISTORY:  ESRD on dialysis  H/O hypotension  Diabetes mellitus  S/P BKA (below knee amputation) bilateral  AV fistula (06 Mar 2023 17:32)      BP monitoring,continue current antihypertensive meds, low salt diet,followup with PMD in 1-2 weeks        esrd on hd , will plan for hd as order    Excess fluids and waste products will be removed from your blood; your electrolytes will be balanced; your blood pressure will be controlled.      ANEMIA PLAN:  Anemia of chronic disease:  We will continue epo aiming for a HCT of 32-36 %.   We will monitor Iron stores, B12 and RBC folate .      send blood and urine cx,serial lactate levels,monitor vitals closley,ivfs hydration,monitor urine output and renal profile,iv abx

## 2023-04-13 NOTE — PROGRESS NOTE ADULT - PROBLEM SELECTOR PLAN 7
- DVT ppx: ASA daily per ortho
- DVT ppx: ASA daily per ortho  - Dispo: Patient requesting Rx for his medications as well as diabetes supplies: test strips and lancets to [facility] and pain medications to [facility]
-recommend to place on heparin subq tid; case d/w orthopedic team
- DVT ppx: ASA daily per ortho
- DVT ppx: ASA daily per ortho  - Dispo: Patient requesting Rx for his medications as well as diabetes supplies: test strips and lancets
-diabetic retinopathy.  severe proliferative diabetic retinopathy, previously on cosopt, ophtho outpt follow up   -EiP4z=4.5%  -c/w lantus 4 and ss  -c/w ASA daily  -c/w Atorvastatin 40 mg daily (started 3/31)
- DVT ppx: ASA daily per ortho  - Dispo: Patient requesting Rx for his medications as well as diabetes supplies: test strips and lancets
- DVT ppx: ASA daily per ortho
-recommend to place on heparin subq tid; case d/w orthopedic team
- DVT ppx: ASA daily per ortho

## 2023-04-13 NOTE — PROGRESS NOTE ADULT - PROBLEM SELECTOR PROBLEM 1
Infection of right hand
Pre-op evaluation
Pre-op evaluation

## 2023-04-14 ENCOUNTER — TRANSCRIPTION ENCOUNTER (OUTPATIENT)
Age: 48
End: 2023-04-14

## 2023-04-14 VITALS
TEMPERATURE: 97 F | DIASTOLIC BLOOD PRESSURE: 54 MMHG | HEART RATE: 72 BPM | SYSTOLIC BLOOD PRESSURE: 154 MMHG | RESPIRATION RATE: 18 BRPM | OXYGEN SATURATION: 100 %

## 2023-04-14 LAB
GLUCOSE BLDC GLUCOMTR-MCNC: 113 MG/DL — HIGH (ref 70–99)
GLUCOSE BLDC GLUCOMTR-MCNC: 130 MG/DL — HIGH (ref 70–99)
GLUCOSE BLDC GLUCOMTR-MCNC: 93 MG/DL — SIGNIFICANT CHANGE UP (ref 70–99)

## 2023-04-14 RX ORDER — COLLAGENASE CLOSTRIDIUM HIST. 250 UNIT/G
1 OINTMENT (GRAM) TOPICAL
Qty: 1 | Refills: 0
Start: 2023-04-14 | End: 2023-05-13

## 2023-04-14 RX ORDER — DORZOLAMIDE HYDROCHLORIDE TIMOLOL MALEATE 20; 5 MG/ML; MG/ML
1 SOLUTION/ DROPS OPHTHALMIC
Qty: 1 | Refills: 0
Start: 2023-04-14

## 2023-04-14 RX ORDER — INSULIN GLARGINE 100 [IU]/ML
4 INJECTION, SOLUTION SUBCUTANEOUS
Qty: 1 | Refills: 0
Start: 2023-04-14

## 2023-04-14 RX ORDER — AMLODIPINE BESYLATE 2.5 MG/1
1 TABLET ORAL
Qty: 30 | Refills: 0
Start: 2023-04-14 | End: 2023-05-13

## 2023-04-14 RX ORDER — PETROLATUM,WHITE
1 JELLY (GRAM) TOPICAL
Qty: 1 | Refills: 0
Start: 2023-04-14

## 2023-04-14 RX ORDER — ASPIRIN/CALCIUM CARB/MAGNESIUM 324 MG
1 TABLET ORAL
Qty: 45 | Refills: 0
Start: 2023-04-14 | End: 2023-05-28

## 2023-04-14 RX ORDER — ATORVASTATIN CALCIUM 80 MG/1
1 TABLET, FILM COATED ORAL
Qty: 30 | Refills: 0
Start: 2023-04-14 | End: 2023-05-13

## 2023-04-14 RX ORDER — ACETAMINOPHEN 500 MG
3 TABLET ORAL
Qty: 270 | Refills: 0
Start: 2023-04-14 | End: 2023-05-13

## 2023-04-14 RX ORDER — ERYTHROPOIETIN 10000 [IU]/ML
0 INJECTION, SOLUTION INTRAVENOUS; SUBCUTANEOUS
Qty: 0 | Refills: 0 | DISCHARGE
Start: 2023-04-14

## 2023-04-14 RX ORDER — SENNA PLUS 8.6 MG/1
2 TABLET ORAL
Qty: 60 | Refills: 0
Start: 2023-04-14 | End: 2023-05-13

## 2023-04-14 RX ORDER — OXYCODONE HYDROCHLORIDE 5 MG/1
1 TABLET ORAL
Qty: 42 | Refills: 0
Start: 2023-04-14 | End: 2023-04-27

## 2023-04-14 RX ADMIN — CHLORHEXIDINE GLUCONATE 1 APPLICATION(S): 213 SOLUTION TOPICAL at 06:45

## 2023-04-14 RX ADMIN — Medication 81 MILLIGRAM(S): at 11:16

## 2023-04-14 RX ADMIN — Medication 975 MILLIGRAM(S): at 03:02

## 2023-04-14 RX ADMIN — Medication 1 APPLICATION(S): at 05:06

## 2023-04-14 RX ADMIN — Medication 975 MILLIGRAM(S): at 03:51

## 2023-04-14 RX ADMIN — OXYCODONE HYDROCHLORIDE 10 MILLIGRAM(S): 5 TABLET ORAL at 03:02

## 2023-04-14 RX ADMIN — OXYCODONE HYDROCHLORIDE 10 MILLIGRAM(S): 5 TABLET ORAL at 10:45

## 2023-04-14 RX ADMIN — DORZOLAMIDE HYDROCHLORIDE TIMOLOL MALEATE 1 DROP(S): 20; 5 SOLUTION/ DROPS OPHTHALMIC at 06:13

## 2023-04-14 RX ADMIN — ERYTHROPOIETIN 10000 UNIT(S): 10000 INJECTION, SOLUTION INTRAVENOUS; SUBCUTANEOUS at 08:52

## 2023-04-14 RX ADMIN — Medication 975 MILLIGRAM(S): at 10:45

## 2023-04-14 NOTE — PROGRESS NOTE ADULT - SUBJECTIVE AND OBJECTIVE BOX
Orthopedic Progress Note     S:  No acute events overnight, pain is well controlled.  Patient denies any chest pain, SOB, N/V, fevers/chills. Ready for DC today.     T(C): 36.4 (04-14-23 @ 06:10), Max: 37.2 (04-13-23 @ 09:34)  HR: 60 (04-14-23 @ 06:10) (60 - 71)  BP: 119/52 (04-14-23 @ 06:10) (113/60 - 145/66)  RR: 19 (04-14-23 @ 06:10) (18 - 19)  SpO2: 100% (04-14-23 @ 06:10) (99% - 100%)  Wt(kg): --I&O's Summary    12 Apr 2023 07:01  -  13 Apr 2023 07:00  --------------------------------------------------------  IN: 640 mL / OUT: 2900 mL / NET: -2260 mL        O:    Physical exam:  Gen: Alert and Oriented x3, No Acute Distress    Right Upper EXT:            Dressing: Ace c/d/i  (adaptic on volar wound)            Motor: grossly in tact            Sensation: grossly in tact            Digits exposed are warm and well perfused

## 2023-04-14 NOTE — DISCHARGE NOTE NURSING/CASE MANAGEMENT/SOCIAL WORK - PATIENT PORTAL LINK FT
You can access the FollowMyHealth Patient Portal offered by [facility] by registering at the following website: [email or web address]. By joining [facility]’s FollowMyHealth portal, you will also be able to view your health information using other applications (apps) compatible with our system.

## 2023-04-14 NOTE — PROGRESS NOTE ADULT - REASON FOR ADMISSION
Right forearm gas gangrene

## 2023-04-14 NOTE — PROGRESS NOTE ADULT - PROVIDER SPECIALTY LIST ADULT
Anesthesia
Hospitalist
Hospitalist
Infectious Disease
Infectious Disease
Nephrology
Orthopedics
Anesthesia
Hospitalist
Infectious Disease
Nephrology
Orthopedics
Hospitalist
Hospitalist
Nephrology
Orthopedics
Vascular Surgery
Hospitalist
Infectious Disease
Infectious Disease
Nephrology
Orthopedics
Vascular Surgery
Nephrology
Hospitalist
Internal Medicine
Hospitalist

## 2023-04-14 NOTE — DISCHARGE NOTE NURSING/CASE MANAGEMENT/SOCIAL WORK - NSDCPNINST_GEN_ALL_CORE
Make a follow up appointment with Dr. Sin. Wound care as instructed. Take your antibiotics as instructed. Continue to follow a consistent carbohydrate diet and take your medications for diabetes.

## 2023-04-14 NOTE — PROGRESS NOTE ADULT - SUBJECTIVE AND OBJECTIVE BOX
Patient is a 47y Male whom presented to the hospital with esrd on hd    PAST MEDICAL & SURGICAL HISTORY:  ESRD on dialysis      H/O hypotension      Diabetes mellitus      S/P BKA (below knee amputation) bilateral      AV fistula          MEDICATIONS  (STANDING):  acetaminophen     Tablet .. 975 milliGRAM(s) Oral every 8 hours  ampicillin/sulbactam  IVPB      aspirin  chewable 81 milliGRAM(s) Oral daily  dextrose 5%. 1000 milliLiter(s) (50 mL/Hr) IV Continuous <Continuous>  dextrose 5%. 1000 milliLiter(s) (100 mL/Hr) IV Continuous <Continuous>  dextrose 50% Injectable 25 Gram(s) IV Push once  dextrose 50% Injectable 12.5 Gram(s) IV Push once  dextrose 50% Injectable 25 Gram(s) IV Push once  glucagon  Injectable 1 milliGRAM(s) IntraMuscular once  insulin lispro (ADMELOG) corrective regimen sliding scale   SubCutaneous three times a day before meals  lactated ringers. 1000 milliLiter(s) (100 mL/Hr) IV Continuous <Continuous>      Allergies    No Known Drug Allergies  [place] (Rash)    Intolerances        SOCIAL HISTORY:  Denies ETOh,Smoking,     FAMILY HISTORY:  No pertinent family history in first degree relatives        REVIEW OF SYSTEMS:    CONSTITUTIONAL: No weakness, fevers or chills                                                                                                                                           CAPILLARY BLOOD GLUCOSE      POCT Blood Glucose.: 130 mg/dL (14 Apr 2023 10:42)  POCT Blood Glucose.: 93 mg/dL (14 Apr 2023 09:56)  POCT Blood Glucose.: 113 mg/dL (14 Apr 2023 06:20)  POCT Blood Glucose.: 155 mg/dL (13 Apr 2023 21:41)      Vital Signs Last 24 Hrs  T(C): 36.3 (14 Apr 2023 11:12), Max: 36.6 (14 Apr 2023 06:40)  T(F): 97.4 (14 Apr 2023 11:12), Max: 97.9 (14 Apr 2023 06:40)  HR: 72 (14 Apr 2023 11:12) (58 - 72)  BP: 154/54 (14 Apr 2023 11:12) (119/52 - 154/54)  BP(mean): --  RR: 18 (14 Apr 2023 11:12) (16 - 19)  SpO2: 100% (14 Apr 2023 11:12) (100% - 100%)    Parameters below as of 14 Apr 2023 11:12  Patient On (Oxygen Delivery Method): room air                        CAPILLARY BLOOD GLUCOSE      POCT Blood Glucose.: 142 mg/dL (13 Apr 2023 16:46)  POCT Blood Glucose.: 170 mg/dL (13 Apr 2023 11:31)  POCT Blood Glucose.: 106 mg/dL (13 Apr 2023 07:16)  POCT Blood Glucose.: 99 mg/dL (12 Apr 2023 21:26)      Vital Signs Last 24 Hrs  T(C): 36.4 (13 Apr 2023 17:47), Max: 37.2 (13 Apr 2023 09:34)  T(F): 97.5 (13 Apr 2023 17:47), Max: 98.9 (13 Apr 2023 09:34)  HR: 65 (13 Apr 2023 17:47) (64 - 72)  BP: 124/61 (13 Apr 2023 17:47) (113/60 - 149/68)  BP(mean): --  RR: 18 (13 Apr 2023 17:47) (18 - 18)  SpO2: 99% (13 Apr 2023 17:47) (99% - 100%)    Parameters below as of 13 Apr 2023 17:47  Patient On (Oxygen Delivery Method): room air                     PHYSICAL EXAM:    Constitutional: NAD  HEENT: conjunctive   clear   Neck:  No JVD  Respiratory: CTAB  Cardiovascular: S1 and S2  Gastrointestinal: BS+, soft, NT/ND  Extremities: No peripheral edema ,  Prior right 3rd digit amputation.S/P BKA (below knee amputation) bilateral  Access: pos fistula

## 2023-04-14 NOTE — DISCHARGE NOTE NURSING/CASE MANAGEMENT/SOCIAL WORK - NSSCTYPOFSERV_GEN_ALL_CORE
VN resumption of care anticipated the day after hospital discharge; the HA will contact you to arrange time of visit.  The discharging RN at hospital is to provide you with initial wound care supplies at discharge.

## 2023-04-14 NOTE — PROGRESS NOTE ADULT - ASSESSMENT
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 Sin/Dr. Singh) who was seen by Dr. Sin today in the office and sent in to [facility] for urgent irrigation and debridement. Patient states he missed dialysis today because he was instructed to come straight to the ED.     PAST MEDICAL & SURGICAL HISTORY:  ESRD on dialysis  H/O hypotension  Diabetes mellitus  S/P BKA (below knee amputation) bilateral  AV fistula (06 Mar 2023 17:32)      BP monitoring,continue current antihypertensive meds, low salt diet,followup with PMD in 1-2 weeks        esrd on hd , will plan for hd as order    Excess fluids and waste products will be removed from your blood; your electrolytes will be balanced; your blood pressure will be controlled.      ANEMIA PLAN:  Anemia of chronic disease:  We will continue epo aiming for a HCT of 32-36 %.   We will monitor Iron stores, B12 and RBC folate .      send blood and urine cx,serial lactate levels,monitor vitals closley,ivfs hydration,monitor urine output and renal profile,iv abx

## 2023-04-14 NOTE — DISCHARGE NOTE NURSING/CASE MANAGEMENT/SOCIAL WORK - NSDCPEFALRISK_GEN_ALL_CORE
For information on Fall & Injury Prevention, visit: [email or web address] OR  [email or web address] OR  https://www.cdc.gov/steadi/patient.html

## 2023-04-14 NOTE — PROGRESS NOTE ADULT - NUTRITIONAL ASSESSMENT
MEDICATIONS  (STANDING):  acetaminophen     Tablet .. 975 milliGRAM(s) Oral every 8 hours  amLODIPine   Tablet 10 milliGRAM(s) Oral daily  ampicillin/sulbactam  IVPB      ampicillin/sulbactam  IVPB 3 Gram(s) IV Intermittent every 24 hours  AQUAPHOR (petrolatum Ointment) 1 Application(s) Topical two times a day  aspirin  chewable 81 milliGRAM(s) Oral daily  atorvastatin 40 milliGRAM(s) Oral at bedtime  collagenase Ointment 1 Application(s) Topical two times a day  dextrose 5%. 1000 milliLiter(s) (50 mL/Hr) IV Continuous <Continuous>  dextrose 5%. 1000 milliLiter(s) (100 mL/Hr) IV Continuous <Continuous>  dextrose 50% Injectable 25 Gram(s) IV Push once  dextrose 50% Injectable 12.5 Gram(s) IV Push once  dextrose 50% Injectable 25 Gram(s) IV Push once  dorzolamide 2%/timolol 0.5% Ophthalmic Solution 1 Drop(s) Both EYES two times a day  epoetin deidre-epbx (RETACRIT) Injectable 02497 Unit(s) IV Push <User Schedule>  glucagon  Injectable 1 milliGRAM(s) IntraMuscular once  insulin glargine Injectable (LANTUS) 4 Unit(s) SubCutaneous at bedtime  insulin lispro (ADMELOG) corrective regimen sliding scale   SubCutaneous at bedtime  insulin lispro (ADMELOG) corrective regimen sliding scale   SubCutaneous three times a day before meals  senna 2 Tablet(s) Oral at bedtime  Sucroferric Oxyhydroxide 2500mg Tablet 2 Tablet(s) 2 Tablet(s) Oral three times a day
MEDICATIONS  (STANDING):  acetaminophen     Tablet .. 975 milliGRAM(s) Oral every 8 hours  amLODIPine   Tablet 10 milliGRAM(s) Oral daily  ampicillin/sulbactam  IVPB      ampicillin/sulbactam  IVPB 3 Gram(s) IV Intermittent every 24 hours  AQUAPHOR (petrolatum Ointment) 1 Application(s) Topical two times a day  aspirin  chewable 81 milliGRAM(s) Oral daily  atorvastatin 40 milliGRAM(s) Oral at bedtime  collagenase Ointment 1 Application(s) Topical two times a day  dextrose 5%. 1000 milliLiter(s) (50 mL/Hr) IV Continuous <Continuous>  dextrose 5%. 1000 milliLiter(s) (100 mL/Hr) IV Continuous <Continuous>  dextrose 50% Injectable 25 Gram(s) IV Push once  dextrose 50% Injectable 12.5 Gram(s) IV Push once  dextrose 50% Injectable 25 Gram(s) IV Push once  dorzolamide 2%/timolol 0.5% Ophthalmic Solution 1 Drop(s) Both EYES two times a day  epoetin deidre-epbx (RETACRIT) Injectable 29070 Unit(s) IV Push <User Schedule>  glucagon  Injectable 1 milliGRAM(s) IntraMuscular once  insulin glargine Injectable (LANTUS) 4 Unit(s) SubCutaneous at bedtime  insulin lispro (ADMELOG) corrective regimen sliding scale   SubCutaneous at bedtime  insulin lispro (ADMELOG) corrective regimen sliding scale   SubCutaneous three times a day before meals  senna 2 Tablet(s) Oral at bedtime  Sucroferric Oxyhydroxide 2500mg Tablet 2 Tablet(s) 2 Tablet(s) Oral three times a day
MEDICATIONS  (STANDING):  acetaminophen     Tablet .. 975 milliGRAM(s) Oral every 8 hours  amLODIPine   Tablet 10 milliGRAM(s) Oral daily  ampicillin/sulbactam  IVPB      ampicillin/sulbactam  IVPB 3 Gram(s) IV Intermittent every 24 hours  AQUAPHOR (petrolatum Ointment) 1 Application(s) Topical two times a day  aspirin  chewable 81 milliGRAM(s) Oral daily  atorvastatin 40 milliGRAM(s) Oral at bedtime  collagenase Ointment 1 Application(s) Topical two times a day  dextrose 5%. 1000 milliLiter(s) (50 mL/Hr) IV Continuous <Continuous>  dextrose 5%. 1000 milliLiter(s) (100 mL/Hr) IV Continuous <Continuous>  dextrose 50% Injectable 25 Gram(s) IV Push once  dextrose 50% Injectable 12.5 Gram(s) IV Push once  dextrose 50% Injectable 25 Gram(s) IV Push once  dorzolamide 2%/timolol 0.5% Ophthalmic Solution 1 Drop(s) Both EYES two times a day  epoetin deidre-epbx (RETACRIT) Injectable 70910 Unit(s) IV Push <User Schedule>  glucagon  Injectable 1 milliGRAM(s) IntraMuscular once  insulin glargine Injectable (LANTUS) 4 Unit(s) SubCutaneous at bedtime  insulin lispro (ADMELOG) corrective regimen sliding scale   SubCutaneous at bedtime  insulin lispro (ADMELOG) corrective regimen sliding scale   SubCutaneous three times a day before meals  senna 2 Tablet(s) Oral at bedtime  Sucroferric Oxyhydroxide 2500mg Tablet 2 Tablet(s) 2 Tablet(s) Oral three times a day
MEDICATIONS  (STANDING):  acetaminophen     Tablet .. 975 milliGRAM(s) Oral every 8 hours  amLODIPine   Tablet 10 milliGRAM(s) Oral daily  ampicillin/sulbactam  IVPB      ampicillin/sulbactam  IVPB 3 Gram(s) IV Intermittent every 24 hours  AQUAPHOR (petrolatum Ointment) 1 Application(s) Topical two times a day  aspirin  chewable 81 milliGRAM(s) Oral daily  atorvastatin 40 milliGRAM(s) Oral at bedtime  collagenase Ointment 1 Application(s) Topical two times a day  dextrose 5%. 1000 milliLiter(s) (50 mL/Hr) IV Continuous <Continuous>  dextrose 5%. 1000 milliLiter(s) (100 mL/Hr) IV Continuous <Continuous>  dextrose 50% Injectable 25 Gram(s) IV Push once  dextrose 50% Injectable 12.5 Gram(s) IV Push once  dextrose 50% Injectable 25 Gram(s) IV Push once  dorzolamide 2%/timolol 0.5% Ophthalmic Solution 1 Drop(s) Both EYES two times a day  epoetin deidre-epbx (RETACRIT) Injectable 72860 Unit(s) IV Push <User Schedule>  glucagon  Injectable 1 milliGRAM(s) IntraMuscular once  insulin glargine Injectable (LANTUS) 4 Unit(s) SubCutaneous at bedtime  insulin lispro (ADMELOG) corrective regimen sliding scale   SubCutaneous at bedtime  insulin lispro (ADMELOG) corrective regimen sliding scale   SubCutaneous three times a day before meals  senna 2 Tablet(s) Oral at bedtime  Sucroferric Oxyhydroxide 2500mg Tablet 2 Tablet(s) 2 Tablet(s) Oral three times a day
MEDICATIONS  (STANDING):  acetaminophen     Tablet .. 975 milliGRAM(s) Oral every 8 hours  ampicillin/sulbactam  IVPB      ampicillin/sulbactam  IVPB 3 Gram(s) IV Intermittent every 24 hours  aspirin  chewable 81 milliGRAM(s) Oral daily  chlorhexidine 2% Cloths 1 Application(s) Topical <User Schedule>  dextrose 5%. 1000 milliLiter(s) (50 mL/Hr) IV Continuous <Continuous>  dextrose 5%. 1000 milliLiter(s) (100 mL/Hr) IV Continuous <Continuous>  dextrose 50% Injectable 25 Gram(s) IV Push once  dextrose 50% Injectable 12.5 Gram(s) IV Push once  dextrose 50% Injectable 25 Gram(s) IV Push once  epoetin deidre-epbx (RETACRIT) Injectable 34934 Unit(s) IV Push once  glucagon  Injectable 1 milliGRAM(s) IntraMuscular once  insulin glargine Injectable (LANTUS) 7 Unit(s) SubCutaneous at bedtime  insulin lispro (ADMELOG) corrective regimen sliding scale   SubCutaneous at bedtime  insulin lispro (ADMELOG) corrective regimen sliding scale   SubCutaneous three times a day before meals  insulin lispro Injectable (ADMELOG) 2 Unit(s) SubCutaneous three times a day before meals  remdesivir  IVPB 100 milliGRAM(s) IV Intermittent once  senna 2 Tablet(s) Oral at bedtime  vancomycin  IVPB 1000 milliGRAM(s) IV Intermittent once
MEDICATIONS  (STANDING):  acetaminophen     Tablet .. 975 milliGRAM(s) Oral every 8 hours  amLODIPine   Tablet 10 milliGRAM(s) Oral daily  ampicillin/sulbactam  IVPB      ampicillin/sulbactam  IVPB 3 Gram(s) IV Intermittent every 24 hours  AQUAPHOR (petrolatum Ointment) 1 Application(s) Topical two times a day  aspirin  chewable 81 milliGRAM(s) Oral daily  atorvastatin 40 milliGRAM(s) Oral at bedtime  collagenase Ointment 1 Application(s) Topical two times a day  dextrose 5%. 1000 milliLiter(s) (50 mL/Hr) IV Continuous <Continuous>  dextrose 5%. 1000 milliLiter(s) (100 mL/Hr) IV Continuous <Continuous>  dextrose 50% Injectable 25 Gram(s) IV Push once  dextrose 50% Injectable 12.5 Gram(s) IV Push once  dextrose 50% Injectable 25 Gram(s) IV Push once  dorzolamide 2%/timolol 0.5% Ophthalmic Solution 1 Drop(s) Both EYES two times a day  epoetin deidre-epbx (RETACRIT) Injectable 99565 Unit(s) IV Push <User Schedule>  glucagon  Injectable 1 milliGRAM(s) IntraMuscular once  insulin glargine Injectable (LANTUS) 4 Unit(s) SubCutaneous at bedtime  insulin lispro (ADMELOG) corrective regimen sliding scale   SubCutaneous at bedtime  insulin lispro (ADMELOG) corrective regimen sliding scale   SubCutaneous three times a day before meals  senna 2 Tablet(s) Oral at bedtime  Sucroferric Oxyhydroxide 2500mg Tablet 2 Tablet(s) 2 Tablet(s) Oral three times a day
MEDICATIONS  (STANDING):  acetaminophen     Tablet .. 975 milliGRAM(s) Oral every 8 hours  amLODIPine   Tablet 10 milliGRAM(s) Oral daily  ampicillin/sulbactam  IVPB 3 Gram(s) IV Intermittent every 24 hours  ampicillin/sulbactam  IVPB      AQUAPHOR (petrolatum Ointment) 1 Application(s) Topical two times a day  AQUAPHOR (petrolatum Ointment) 1 Application(s) Topical daily  aspirin  chewable 81 milliGRAM(s) Oral daily  atorvastatin 40 milliGRAM(s) Oral at bedtime  chlorhexidine 2% Cloths 1 Application(s) Topical <User Schedule>  collagenase Ointment 1 Application(s) Topical two times a day  dextrose 5%. 1000 milliLiter(s) (50 mL/Hr) IV Continuous <Continuous>  dextrose 5%. 1000 milliLiter(s) (100 mL/Hr) IV Continuous <Continuous>  dextrose 50% Injectable 25 Gram(s) IV Push once  dextrose 50% Injectable 12.5 Gram(s) IV Push once  dextrose 50% Injectable 25 Gram(s) IV Push once  dorzolamide 2%/timolol 0.5% Ophthalmic Solution 1 Drop(s) Both EYES two times a day  epoetin deidre-epbx (RETACRIT) Injectable 27201 Unit(s) IV Push <User Schedule>  glucagon  Injectable 1 milliGRAM(s) IntraMuscular once  insulin glargine Injectable (LANTUS) 4 Unit(s) SubCutaneous at bedtime  insulin lispro (ADMELOG) corrective regimen sliding scale   SubCutaneous at bedtime  insulin lispro (ADMELOG) corrective regimen sliding scale   SubCutaneous three times a day before meals  senna 2 Tablet(s) Oral at bedtime  Sucroferric Oxyhydroxide 2500mg Tablet 2 Tablet(s) 2 Tablet(s) Oral three times a day
MEDICATIONS  (STANDING):  acetaminophen     Tablet .. 975 milliGRAM(s) Oral every 8 hours  amLODIPine   Tablet 10 milliGRAM(s) Oral daily  ampicillin/sulbactam  IVPB 3 Gram(s) IV Intermittent every 24 hours  ampicillin/sulbactam  IVPB      AQUAPHOR (petrolatum Ointment) 1 Application(s) Topical two times a day  AQUAPHOR (petrolatum Ointment) 1 Application(s) Topical daily  aspirin  chewable 81 milliGRAM(s) Oral daily  atorvastatin 40 milliGRAM(s) Oral at bedtime  chlorhexidine 2% Cloths 1 Application(s) Topical <User Schedule>  collagenase Ointment 1 Application(s) Topical two times a day  dextrose 5%. 1000 milliLiter(s) (50 mL/Hr) IV Continuous <Continuous>  dextrose 5%. 1000 milliLiter(s) (100 mL/Hr) IV Continuous <Continuous>  dextrose 50% Injectable 25 Gram(s) IV Push once  dextrose 50% Injectable 12.5 Gram(s) IV Push once  dextrose 50% Injectable 25 Gram(s) IV Push once  dorzolamide 2%/timolol 0.5% Ophthalmic Solution 1 Drop(s) Both EYES two times a day  epoetin deidre-epbx (RETACRIT) Injectable 72841 Unit(s) IV Push <User Schedule>  glucagon  Injectable 1 milliGRAM(s) IntraMuscular once  insulin glargine Injectable (LANTUS) 4 Unit(s) SubCutaneous at bedtime  insulin lispro (ADMELOG) corrective regimen sliding scale   SubCutaneous at bedtime  insulin lispro (ADMELOG) corrective regimen sliding scale   SubCutaneous three times a day before meals  senna 2 Tablet(s) Oral at bedtime  Sucroferric Oxyhydroxide 2500mg Tablet 2 Tablet(s) 2 Tablet(s) Oral three times a day
MEDICATIONS  (STANDING):  acetaminophen     Tablet .. 975 milliGRAM(s) Oral every 8 hours  ampicillin/sulbactam  IVPB      ampicillin/sulbactam  IVPB 3 Gram(s) IV Intermittent every 24 hours  aspirin  chewable 81 milliGRAM(s) Oral daily  chlorhexidine 2% Cloths 1 Application(s) Topical <User Schedule>  dextrose 5%. 1000 milliLiter(s) (50 mL/Hr) IV Continuous <Continuous>  dextrose 5%. 1000 milliLiter(s) (100 mL/Hr) IV Continuous <Continuous>  dextrose 50% Injectable 25 Gram(s) IV Push once  dextrose 50% Injectable 12.5 Gram(s) IV Push once  dextrose 50% Injectable 25 Gram(s) IV Push once  epoetin deidre-epbx (RETACRIT) Injectable 19787 Unit(s) IV Push once  glucagon  Injectable 1 milliGRAM(s) IntraMuscular once  insulin glargine Injectable (LANTUS) 7 Unit(s) SubCutaneous at bedtime  insulin lispro (ADMELOG) corrective regimen sliding scale   SubCutaneous at bedtime  insulin lispro (ADMELOG) corrective regimen sliding scale   SubCutaneous three times a day before meals  insulin lispro Injectable (ADMELOG) 2 Unit(s) SubCutaneous three times a day before meals  remdesivir  IVPB 100 milliGRAM(s) IV Intermittent once  senna 2 Tablet(s) Oral at bedtime  vancomycin  IVPB 1000 milliGRAM(s) IV Intermittent once
MEDICATIONS  (STANDING):  acetaminophen     Tablet .. 975 milliGRAM(s) Oral every 8 hours  ampicillin/sulbactam  IVPB      ampicillin/sulbactam  IVPB 3 Gram(s) IV Intermittent every 24 hours  aspirin  chewable 81 milliGRAM(s) Oral daily  chlorhexidine 2% Cloths 1 Application(s) Topical <User Schedule>  dextrose 5%. 1000 milliLiter(s) (50 mL/Hr) IV Continuous <Continuous>  dextrose 5%. 1000 milliLiter(s) (100 mL/Hr) IV Continuous <Continuous>  dextrose 50% Injectable 25 Gram(s) IV Push once  dextrose 50% Injectable 12.5 Gram(s) IV Push once  dextrose 50% Injectable 25 Gram(s) IV Push once  epoetin deidre-epbx (RETACRIT) Injectable 44809 Unit(s) IV Push once  glucagon  Injectable 1 milliGRAM(s) IntraMuscular once  insulin glargine Injectable (LANTUS) 7 Unit(s) SubCutaneous at bedtime  insulin lispro (ADMELOG) corrective regimen sliding scale   SubCutaneous at bedtime  insulin lispro (ADMELOG) corrective regimen sliding scale   SubCutaneous three times a day before meals  insulin lispro Injectable (ADMELOG) 2 Unit(s) SubCutaneous three times a day before meals  remdesivir  IVPB 100 milliGRAM(s) IV Intermittent once  senna 2 Tablet(s) Oral at bedtime  vancomycin  IVPB 1000 milliGRAM(s) IV Intermittent once
MEDICATIONS  (STANDING):  acetaminophen     Tablet .. 975 milliGRAM(s) Oral every 8 hours  ampicillin/sulbactam  IVPB      ampicillin/sulbactam  IVPB 3 Gram(s) IV Intermittent every 24 hours  aspirin  chewable 81 milliGRAM(s) Oral daily  chlorhexidine 2% Cloths 1 Application(s) Topical <User Schedule>  dextrose 5%. 1000 milliLiter(s) (50 mL/Hr) IV Continuous <Continuous>  dextrose 5%. 1000 milliLiter(s) (100 mL/Hr) IV Continuous <Continuous>  dextrose 50% Injectable 25 Gram(s) IV Push once  dextrose 50% Injectable 12.5 Gram(s) IV Push once  dextrose 50% Injectable 25 Gram(s) IV Push once  epoetin deidre-epbx (RETACRIT) Injectable 62212 Unit(s) IV Push once  glucagon  Injectable 1 milliGRAM(s) IntraMuscular once  insulin glargine Injectable (LANTUS) 7 Unit(s) SubCutaneous at bedtime  insulin lispro (ADMELOG) corrective regimen sliding scale   SubCutaneous at bedtime  insulin lispro (ADMELOG) corrective regimen sliding scale   SubCutaneous three times a day before meals  insulin lispro Injectable (ADMELOG) 2 Unit(s) SubCutaneous three times a day before meals  remdesivir  IVPB 100 milliGRAM(s) IV Intermittent once  senna 2 Tablet(s) Oral at bedtime  vancomycin  IVPB 1000 milliGRAM(s) IV Intermittent once
MEDICATIONS  (STANDING):  acetaminophen     Tablet .. 975 milliGRAM(s) Oral every 8 hours  ampicillin/sulbactam  IVPB      ampicillin/sulbactam  IVPB 3 Gram(s) IV Intermittent every 24 hours  aspirin  chewable 81 milliGRAM(s) Oral daily  chlorhexidine 2% Cloths 1 Application(s) Topical <User Schedule>  dextrose 5%. 1000 milliLiter(s) (50 mL/Hr) IV Continuous <Continuous>  dextrose 5%. 1000 milliLiter(s) (100 mL/Hr) IV Continuous <Continuous>  dextrose 50% Injectable 25 Gram(s) IV Push once  dextrose 50% Injectable 12.5 Gram(s) IV Push once  dextrose 50% Injectable 25 Gram(s) IV Push once  epoetin deidre-epbx (RETACRIT) Injectable 74586 Unit(s) IV Push once  glucagon  Injectable 1 milliGRAM(s) IntraMuscular once  insulin glargine Injectable (LANTUS) 7 Unit(s) SubCutaneous at bedtime  insulin lispro (ADMELOG) corrective regimen sliding scale   SubCutaneous at bedtime  insulin lispro (ADMELOG) corrective regimen sliding scale   SubCutaneous three times a day before meals  insulin lispro Injectable (ADMELOG) 2 Unit(s) SubCutaneous three times a day before meals  remdesivir  IVPB 100 milliGRAM(s) IV Intermittent once  senna 2 Tablet(s) Oral at bedtime  vancomycin  IVPB 1000 milliGRAM(s) IV Intermittent once
MEDICATIONS  (STANDING):  acetaminophen     Tablet .. 975 milliGRAM(s) Oral every 8 hours  ampicillin/sulbactam  IVPB      ampicillin/sulbactam  IVPB 3 Gram(s) IV Intermittent every 24 hours  aspirin  chewable 81 milliGRAM(s) Oral daily  chlorhexidine 2% Cloths 1 Application(s) Topical <User Schedule>  dextrose 5%. 1000 milliLiter(s) (50 mL/Hr) IV Continuous <Continuous>  dextrose 5%. 1000 milliLiter(s) (100 mL/Hr) IV Continuous <Continuous>  dextrose 50% Injectable 25 Gram(s) IV Push once  dextrose 50% Injectable 12.5 Gram(s) IV Push once  dextrose 50% Injectable 25 Gram(s) IV Push once  epoetin deidre-epbx (RETACRIT) Injectable 81686 Unit(s) IV Push once  glucagon  Injectable 1 milliGRAM(s) IntraMuscular once  insulin glargine Injectable (LANTUS) 7 Unit(s) SubCutaneous at bedtime  insulin lispro (ADMELOG) corrective regimen sliding scale   SubCutaneous at bedtime  insulin lispro (ADMELOG) corrective regimen sliding scale   SubCutaneous three times a day before meals  insulin lispro Injectable (ADMELOG) 2 Unit(s) SubCutaneous three times a day before meals  remdesivir  IVPB 100 milliGRAM(s) IV Intermittent once  senna 2 Tablet(s) Oral at bedtime  vancomycin  IVPB 1000 milliGRAM(s) IV Intermittent once
MEDICATIONS  (STANDING):  acetaminophen     Tablet .. 975 milliGRAM(s) Oral every 8 hours  ampicillin/sulbactam  IVPB      ampicillin/sulbactam  IVPB 3 Gram(s) IV Intermittent every 24 hours  aspirin  chewable 81 milliGRAM(s) Oral daily  chlorhexidine 2% Cloths 1 Application(s) Topical <User Schedule>  dextrose 5%. 1000 milliLiter(s) (50 mL/Hr) IV Continuous <Continuous>  dextrose 5%. 1000 milliLiter(s) (100 mL/Hr) IV Continuous <Continuous>  dextrose 50% Injectable 25 Gram(s) IV Push once  dextrose 50% Injectable 12.5 Gram(s) IV Push once  dextrose 50% Injectable 25 Gram(s) IV Push once  epoetin deidre-epbx (RETACRIT) Injectable 82762 Unit(s) IV Push once  glucagon  Injectable 1 milliGRAM(s) IntraMuscular once  insulin glargine Injectable (LANTUS) 7 Unit(s) SubCutaneous at bedtime  insulin lispro (ADMELOG) corrective regimen sliding scale   SubCutaneous at bedtime  insulin lispro (ADMELOG) corrective regimen sliding scale   SubCutaneous three times a day before meals  insulin lispro Injectable (ADMELOG) 2 Unit(s) SubCutaneous three times a day before meals  remdesivir  IVPB 100 milliGRAM(s) IV Intermittent once  senna 2 Tablet(s) Oral at bedtime  vancomycin  IVPB 1000 milliGRAM(s) IV Intermittent once
MEDICATIONS  (STANDING):  acetaminophen     Tablet .. 975 milliGRAM(s) Oral every 8 hours  amLODIPine   Tablet 10 milliGRAM(s) Oral daily  ampicillin/sulbactam  IVPB      ampicillin/sulbactam  IVPB 3 Gram(s) IV Intermittent every 24 hours  AQUAPHOR (petrolatum Ointment) 1 Application(s) Topical daily  AQUAPHOR (petrolatum Ointment) 1 Application(s) Topical two times a day  aspirin  chewable 81 milliGRAM(s) Oral daily  atorvastatin 40 milliGRAM(s) Oral at bedtime  chlorhexidine 2% Cloths 1 Application(s) Topical <User Schedule>  collagenase Ointment 1 Application(s) Topical two times a day  dextrose 5%. 1000 milliLiter(s) (50 mL/Hr) IV Continuous <Continuous>  dextrose 5%. 1000 milliLiter(s) (100 mL/Hr) IV Continuous <Continuous>  dextrose 50% Injectable 25 Gram(s) IV Push once  dextrose 50% Injectable 12.5 Gram(s) IV Push once  dextrose 50% Injectable 25 Gram(s) IV Push once  dorzolamide 2%/timolol 0.5% Ophthalmic Solution 1 Drop(s) Both EYES two times a day  epoetin deidre-epbx (RETACRIT) Injectable 45431 Unit(s) IV Push <User Schedule>  glucagon  Injectable 1 milliGRAM(s) IntraMuscular once  insulin glargine Injectable (LANTUS) 4 Unit(s) SubCutaneous at bedtime  insulin lispro (ADMELOG) corrective regimen sliding scale   SubCutaneous at bedtime  insulin lispro (ADMELOG) corrective regimen sliding scale   SubCutaneous three times a day before meals  senna 2 Tablet(s) Oral at bedtime  Sucroferric Oxyhydroxide 2500mg Tablet 2 Tablet(s) 2 Tablet(s) Oral three times a day
MEDICATIONS  (STANDING):  acetaminophen     Tablet .. 975 milliGRAM(s) Oral every 8 hours  amLODIPine   Tablet 10 milliGRAM(s) Oral daily  ampicillin/sulbactam  IVPB      ampicillin/sulbactam  IVPB 3 Gram(s) IV Intermittent every 24 hours  AQUAPHOR (petrolatum Ointment) 1 Application(s) Topical two times a day  aspirin  chewable 81 milliGRAM(s) Oral daily  atorvastatin 40 milliGRAM(s) Oral at bedtime  collagenase Ointment 1 Application(s) Topical two times a day  dextrose 5%. 1000 milliLiter(s) (50 mL/Hr) IV Continuous <Continuous>  dextrose 5%. 1000 milliLiter(s) (100 mL/Hr) IV Continuous <Continuous>  dextrose 50% Injectable 25 Gram(s) IV Push once  dextrose 50% Injectable 12.5 Gram(s) IV Push once  dextrose 50% Injectable 25 Gram(s) IV Push once  dorzolamide 2%/timolol 0.5% Ophthalmic Solution 1 Drop(s) Both EYES two times a day  epoetin deidre-epbx (RETACRIT) Injectable 14307 Unit(s) IV Push <User Schedule>  glucagon  Injectable 1 milliGRAM(s) IntraMuscular once  insulin glargine Injectable (LANTUS) 4 Unit(s) SubCutaneous at bedtime  insulin lispro (ADMELOG) corrective regimen sliding scale   SubCutaneous at bedtime  insulin lispro (ADMELOG) corrective regimen sliding scale   SubCutaneous three times a day before meals  senna 2 Tablet(s) Oral at bedtime  Sucroferric Oxyhydroxide 2500mg Tablet 2 Tablet(s) 2 Tablet(s) Oral three times a day
MEDICATIONS  (STANDING):  acetaminophen     Tablet .. 975 milliGRAM(s) Oral every 8 hours  amLODIPine   Tablet 10 milliGRAM(s) Oral daily  ampicillin/sulbactam  IVPB      ampicillin/sulbactam  IVPB 3 Gram(s) IV Intermittent every 24 hours  AQUAPHOR (petrolatum Ointment) 1 Application(s) Topical two times a day  aspirin  chewable 81 milliGRAM(s) Oral daily  atorvastatin 40 milliGRAM(s) Oral at bedtime  collagenase Ointment 1 Application(s) Topical two times a day  dextrose 5%. 1000 milliLiter(s) (50 mL/Hr) IV Continuous <Continuous>  dextrose 5%. 1000 milliLiter(s) (100 mL/Hr) IV Continuous <Continuous>  dextrose 50% Injectable 25 Gram(s) IV Push once  dextrose 50% Injectable 12.5 Gram(s) IV Push once  dextrose 50% Injectable 25 Gram(s) IV Push once  dorzolamide 2%/timolol 0.5% Ophthalmic Solution 1 Drop(s) Both EYES two times a day  epoetin deidre-epbx (RETACRIT) Injectable 40092 Unit(s) IV Push <User Schedule>  glucagon  Injectable 1 milliGRAM(s) IntraMuscular once  insulin glargine Injectable (LANTUS) 4 Unit(s) SubCutaneous at bedtime  insulin lispro (ADMELOG) corrective regimen sliding scale   SubCutaneous at bedtime  insulin lispro (ADMELOG) corrective regimen sliding scale   SubCutaneous three times a day before meals  senna 2 Tablet(s) Oral at bedtime  Sucroferric Oxyhydroxide 2500mg Tablet 2 Tablet(s) 2 Tablet(s) Oral three times a day
MEDICATIONS  (STANDING):  acetaminophen     Tablet .. 975 milliGRAM(s) Oral every 8 hours  ampicillin/sulbactam  IVPB      ampicillin/sulbactam  IVPB 3 Gram(s) IV Intermittent every 24 hours  AQUAPHOR (petrolatum Ointment) 1 Application(s) Topical two times a day  aspirin  chewable 81 milliGRAM(s) Oral daily  atorvastatin 40 milliGRAM(s) Oral at bedtime  collagenase Ointment 1 Application(s) Topical two times a day  dextrose 5%. 1000 milliLiter(s) (50 mL/Hr) IV Continuous <Continuous>  dextrose 5%. 1000 milliLiter(s) (100 mL/Hr) IV Continuous <Continuous>  dextrose 50% Injectable 25 Gram(s) IV Push once  dextrose 50% Injectable 12.5 Gram(s) IV Push once  dextrose 50% Injectable 25 Gram(s) IV Push once  dorzolamide 2%/timolol 0.5% Ophthalmic Solution 1 Drop(s) Both EYES two times a day  glucagon  Injectable 1 milliGRAM(s) IntraMuscular once  insulin glargine Injectable (LANTUS) 4 Unit(s) SubCutaneous at bedtime  insulin lispro (ADMELOG) corrective regimen sliding scale   SubCutaneous at bedtime  insulin lispro (ADMELOG) corrective regimen sliding scale   SubCutaneous three times a day before meals  senna 2 Tablet(s) Oral at bedtime  Sucroferric Oxyhydroxide 2500mg Tablet 2 Tablet(s) 2 Tablet(s) Oral three times a day
MEDICATIONS  (STANDING):  acetaminophen     Tablet .. 975 milliGRAM(s) Oral every 8 hours  ampicillin/sulbactam  IVPB      ampicillin/sulbactam  IVPB 3 Gram(s) IV Intermittent every 24 hours  aspirin  chewable 81 milliGRAM(s) Oral daily  chlorhexidine 2% Cloths 1 Application(s) Topical <User Schedule>  dextrose 5%. 1000 milliLiter(s) (50 mL/Hr) IV Continuous <Continuous>  dextrose 5%. 1000 milliLiter(s) (100 mL/Hr) IV Continuous <Continuous>  dextrose 50% Injectable 25 Gram(s) IV Push once  dextrose 50% Injectable 12.5 Gram(s) IV Push once  dextrose 50% Injectable 25 Gram(s) IV Push once  epoetin deidre-epbx (RETACRIT) Injectable 15742 Unit(s) IV Push once  glucagon  Injectable 1 milliGRAM(s) IntraMuscular once  insulin glargine Injectable (LANTUS) 7 Unit(s) SubCutaneous at bedtime  insulin lispro (ADMELOG) corrective regimen sliding scale   SubCutaneous at bedtime  insulin lispro (ADMELOG) corrective regimen sliding scale   SubCutaneous three times a day before meals  insulin lispro Injectable (ADMELOG) 2 Unit(s) SubCutaneous three times a day before meals  remdesivir  IVPB 100 milliGRAM(s) IV Intermittent once  senna 2 Tablet(s) Oral at bedtime  vancomycin  IVPB 1000 milliGRAM(s) IV Intermittent once
MEDICATIONS  (STANDING):  acetaminophen     Tablet .. 975 milliGRAM(s) Oral every 8 hours  ampicillin/sulbactam  IVPB      ampicillin/sulbactam  IVPB 3 Gram(s) IV Intermittent every 24 hours  aspirin  chewable 81 milliGRAM(s) Oral daily  chlorhexidine 2% Cloths 1 Application(s) Topical <User Schedule>  dextrose 5%. 1000 milliLiter(s) (50 mL/Hr) IV Continuous <Continuous>  dextrose 5%. 1000 milliLiter(s) (100 mL/Hr) IV Continuous <Continuous>  dextrose 50% Injectable 25 Gram(s) IV Push once  dextrose 50% Injectable 12.5 Gram(s) IV Push once  dextrose 50% Injectable 25 Gram(s) IV Push once  epoetin deidre-epbx (RETACRIT) Injectable 26827 Unit(s) IV Push once  glucagon  Injectable 1 milliGRAM(s) IntraMuscular once  insulin glargine Injectable (LANTUS) 7 Unit(s) SubCutaneous at bedtime  insulin lispro (ADMELOG) corrective regimen sliding scale   SubCutaneous at bedtime  insulin lispro (ADMELOG) corrective regimen sliding scale   SubCutaneous three times a day before meals  insulin lispro Injectable (ADMELOG) 2 Unit(s) SubCutaneous three times a day before meals  remdesivir  IVPB 100 milliGRAM(s) IV Intermittent once  senna 2 Tablet(s) Oral at bedtime  vancomycin  IVPB 1000 milliGRAM(s) IV Intermittent once
MEDICATIONS  (STANDING):  acetaminophen     Tablet .. 975 milliGRAM(s) Oral every 8 hours  ampicillin/sulbactam  IVPB      ampicillin/sulbactam  IVPB 3 Gram(s) IV Intermittent every 24 hours  aspirin  chewable 81 milliGRAM(s) Oral daily  chlorhexidine 2% Cloths 1 Application(s) Topical <User Schedule>  dextrose 5%. 1000 milliLiter(s) (50 mL/Hr) IV Continuous <Continuous>  dextrose 5%. 1000 milliLiter(s) (100 mL/Hr) IV Continuous <Continuous>  dextrose 50% Injectable 25 Gram(s) IV Push once  dextrose 50% Injectable 12.5 Gram(s) IV Push once  dextrose 50% Injectable 25 Gram(s) IV Push once  epoetin deidre-epbx (RETACRIT) Injectable 35848 Unit(s) IV Push once  glucagon  Injectable 1 milliGRAM(s) IntraMuscular once  insulin glargine Injectable (LANTUS) 7 Unit(s) SubCutaneous at bedtime  insulin lispro (ADMELOG) corrective regimen sliding scale   SubCutaneous at bedtime  insulin lispro (ADMELOG) corrective regimen sliding scale   SubCutaneous three times a day before meals  insulin lispro Injectable (ADMELOG) 2 Unit(s) SubCutaneous three times a day before meals  remdesivir  IVPB 100 milliGRAM(s) IV Intermittent once  senna 2 Tablet(s) Oral at bedtime  vancomycin  IVPB 1000 milliGRAM(s) IV Intermittent once
MEDICATIONS  (STANDING):  acetaminophen     Tablet .. 975 milliGRAM(s) Oral every 8 hours  ampicillin/sulbactam  IVPB      ampicillin/sulbactam  IVPB 3 Gram(s) IV Intermittent every 24 hours  aspirin  chewable 81 milliGRAM(s) Oral daily  chlorhexidine 2% Cloths 1 Application(s) Topical <User Schedule>  dextrose 5%. 1000 milliLiter(s) (50 mL/Hr) IV Continuous <Continuous>  dextrose 5%. 1000 milliLiter(s) (100 mL/Hr) IV Continuous <Continuous>  dextrose 50% Injectable 25 Gram(s) IV Push once  dextrose 50% Injectable 12.5 Gram(s) IV Push once  dextrose 50% Injectable 25 Gram(s) IV Push once  epoetin deidre-epbx (RETACRIT) Injectable 36548 Unit(s) IV Push once  glucagon  Injectable 1 milliGRAM(s) IntraMuscular once  insulin glargine Injectable (LANTUS) 7 Unit(s) SubCutaneous at bedtime  insulin lispro (ADMELOG) corrective regimen sliding scale   SubCutaneous at bedtime  insulin lispro (ADMELOG) corrective regimen sliding scale   SubCutaneous three times a day before meals  insulin lispro Injectable (ADMELOG) 2 Unit(s) SubCutaneous three times a day before meals  remdesivir  IVPB 100 milliGRAM(s) IV Intermittent once  senna 2 Tablet(s) Oral at bedtime  vancomycin  IVPB 1000 milliGRAM(s) IV Intermittent once
MEDICATIONS  (STANDING):  acetaminophen     Tablet .. 975 milliGRAM(s) Oral every 8 hours  ampicillin/sulbactam  IVPB      ampicillin/sulbactam  IVPB 3 Gram(s) IV Intermittent every 24 hours  aspirin  chewable 81 milliGRAM(s) Oral daily  chlorhexidine 2% Cloths 1 Application(s) Topical <User Schedule>  dextrose 5%. 1000 milliLiter(s) (50 mL/Hr) IV Continuous <Continuous>  dextrose 5%. 1000 milliLiter(s) (100 mL/Hr) IV Continuous <Continuous>  dextrose 50% Injectable 25 Gram(s) IV Push once  dextrose 50% Injectable 12.5 Gram(s) IV Push once  dextrose 50% Injectable 25 Gram(s) IV Push once  epoetin deidre-epbx (RETACRIT) Injectable 43447 Unit(s) IV Push once  glucagon  Injectable 1 milliGRAM(s) IntraMuscular once  insulin glargine Injectable (LANTUS) 7 Unit(s) SubCutaneous at bedtime  insulin lispro (ADMELOG) corrective regimen sliding scale   SubCutaneous at bedtime  insulin lispro (ADMELOG) corrective regimen sliding scale   SubCutaneous three times a day before meals  insulin lispro Injectable (ADMELOG) 2 Unit(s) SubCutaneous three times a day before meals  remdesivir  IVPB 100 milliGRAM(s) IV Intermittent once  senna 2 Tablet(s) Oral at bedtime  vancomycin  IVPB 1000 milliGRAM(s) IV Intermittent once
MEDICATIONS  (STANDING):  acetaminophen     Tablet .. 975 milliGRAM(s) Oral every 8 hours  ampicillin/sulbactam  IVPB      ampicillin/sulbactam  IVPB 3 Gram(s) IV Intermittent every 24 hours  aspirin  chewable 81 milliGRAM(s) Oral daily  chlorhexidine 2% Cloths 1 Application(s) Topical <User Schedule>  dextrose 5%. 1000 milliLiter(s) (50 mL/Hr) IV Continuous <Continuous>  dextrose 5%. 1000 milliLiter(s) (100 mL/Hr) IV Continuous <Continuous>  dextrose 50% Injectable 25 Gram(s) IV Push once  dextrose 50% Injectable 12.5 Gram(s) IV Push once  dextrose 50% Injectable 25 Gram(s) IV Push once  epoetin deidre-epbx (RETACRIT) Injectable 57490 Unit(s) IV Push once  glucagon  Injectable 1 milliGRAM(s) IntraMuscular once  insulin glargine Injectable (LANTUS) 7 Unit(s) SubCutaneous at bedtime  insulin lispro (ADMELOG) corrective regimen sliding scale   SubCutaneous at bedtime  insulin lispro (ADMELOG) corrective regimen sliding scale   SubCutaneous three times a day before meals  insulin lispro Injectable (ADMELOG) 2 Unit(s) SubCutaneous three times a day before meals  remdesivir  IVPB 100 milliGRAM(s) IV Intermittent once  senna 2 Tablet(s) Oral at bedtime  vancomycin  IVPB 1000 milliGRAM(s) IV Intermittent once
MEDICATIONS  (STANDING):  acetaminophen     Tablet .. 975 milliGRAM(s) Oral every 8 hours  ampicillin/sulbactam  IVPB      ampicillin/sulbactam  IVPB 3 Gram(s) IV Intermittent every 24 hours  aspirin  chewable 81 milliGRAM(s) Oral daily  chlorhexidine 2% Cloths 1 Application(s) Topical <User Schedule>  dextrose 5%. 1000 milliLiter(s) (50 mL/Hr) IV Continuous <Continuous>  dextrose 5%. 1000 milliLiter(s) (100 mL/Hr) IV Continuous <Continuous>  dextrose 50% Injectable 25 Gram(s) IV Push once  dextrose 50% Injectable 12.5 Gram(s) IV Push once  dextrose 50% Injectable 25 Gram(s) IV Push once  epoetin deidre-epbx (RETACRIT) Injectable 66305 Unit(s) IV Push once  glucagon  Injectable 1 milliGRAM(s) IntraMuscular once  insulin glargine Injectable (LANTUS) 7 Unit(s) SubCutaneous at bedtime  insulin lispro (ADMELOG) corrective regimen sliding scale   SubCutaneous at bedtime  insulin lispro (ADMELOG) corrective regimen sliding scale   SubCutaneous three times a day before meals  insulin lispro Injectable (ADMELOG) 2 Unit(s) SubCutaneous three times a day before meals  remdesivir  IVPB 100 milliGRAM(s) IV Intermittent once  senna 2 Tablet(s) Oral at bedtime  vancomycin  IVPB 1000 milliGRAM(s) IV Intermittent once
MEDICATIONS  (STANDING):  acetaminophen     Tablet .. 975 milliGRAM(s) Oral every 8 hours  ampicillin/sulbactam  IVPB      ampicillin/sulbactam  IVPB 3 Gram(s) IV Intermittent every 24 hours  aspirin  chewable 81 milliGRAM(s) Oral daily  chlorhexidine 2% Cloths 1 Application(s) Topical <User Schedule>  dextrose 5%. 1000 milliLiter(s) (50 mL/Hr) IV Continuous <Continuous>  dextrose 5%. 1000 milliLiter(s) (100 mL/Hr) IV Continuous <Continuous>  dextrose 50% Injectable 25 Gram(s) IV Push once  dextrose 50% Injectable 12.5 Gram(s) IV Push once  dextrose 50% Injectable 25 Gram(s) IV Push once  epoetin deidre-epbx (RETACRIT) Injectable 67237 Unit(s) IV Push once  glucagon  Injectable 1 milliGRAM(s) IntraMuscular once  insulin glargine Injectable (LANTUS) 7 Unit(s) SubCutaneous at bedtime  insulin lispro (ADMELOG) corrective regimen sliding scale   SubCutaneous at bedtime  insulin lispro (ADMELOG) corrective regimen sliding scale   SubCutaneous three times a day before meals  insulin lispro Injectable (ADMELOG) 2 Unit(s) SubCutaneous three times a day before meals  remdesivir  IVPB 100 milliGRAM(s) IV Intermittent once  senna 2 Tablet(s) Oral at bedtime  vancomycin  IVPB 1000 milliGRAM(s) IV Intermittent once
MEDICATIONS  (STANDING):  acetaminophen     Tablet .. 975 milliGRAM(s) Oral every 8 hours  amLODIPine   Tablet 10 milliGRAM(s) Oral daily  ampicillin/sulbactam  IVPB      ampicillin/sulbactam  IVPB 3 Gram(s) IV Intermittent every 24 hours  AQUAPHOR (petrolatum Ointment) 1 Application(s) Topical two times a day  aspirin  chewable 81 milliGRAM(s) Oral daily  atorvastatin 40 milliGRAM(s) Oral at bedtime  collagenase Ointment 1 Application(s) Topical two times a day  dextrose 5%. 1000 milliLiter(s) (50 mL/Hr) IV Continuous <Continuous>  dextrose 5%. 1000 milliLiter(s) (100 mL/Hr) IV Continuous <Continuous>  dextrose 50% Injectable 25 Gram(s) IV Push once  dextrose 50% Injectable 12.5 Gram(s) IV Push once  dextrose 50% Injectable 25 Gram(s) IV Push once  dorzolamide 2%/timolol 0.5% Ophthalmic Solution 1 Drop(s) Both EYES two times a day  epoetin deidre-epbx (RETACRIT) Injectable 21138 Unit(s) IV Push <User Schedule>  glucagon  Injectable 1 milliGRAM(s) IntraMuscular once  insulin glargine Injectable (LANTUS) 4 Unit(s) SubCutaneous at bedtime  insulin lispro (ADMELOG) corrective regimen sliding scale   SubCutaneous at bedtime  insulin lispro (ADMELOG) corrective regimen sliding scale   SubCutaneous three times a day before meals  senna 2 Tablet(s) Oral at bedtime  Sucroferric Oxyhydroxide 2500mg Tablet 2 Tablet(s) 2 Tablet(s) Oral three times a day
MEDICATIONS  (STANDING):  acetaminophen     Tablet .. 975 milliGRAM(s) Oral every 8 hours  ampicillin/sulbactam  IVPB      ampicillin/sulbactam  IVPB 3 Gram(s) IV Intermittent every 24 hours  aspirin  chewable 81 milliGRAM(s) Oral daily  chlorhexidine 2% Cloths 1 Application(s) Topical <User Schedule>  dextrose 5%. 1000 milliLiter(s) (50 mL/Hr) IV Continuous <Continuous>  dextrose 5%. 1000 milliLiter(s) (100 mL/Hr) IV Continuous <Continuous>  dextrose 50% Injectable 25 Gram(s) IV Push once  dextrose 50% Injectable 12.5 Gram(s) IV Push once  dextrose 50% Injectable 25 Gram(s) IV Push once  epoetin deidre-epbx (RETACRIT) Injectable 06291 Unit(s) IV Push once  glucagon  Injectable 1 milliGRAM(s) IntraMuscular once  insulin glargine Injectable (LANTUS) 7 Unit(s) SubCutaneous at bedtime  insulin lispro (ADMELOG) corrective regimen sliding scale   SubCutaneous at bedtime  insulin lispro (ADMELOG) corrective regimen sliding scale   SubCutaneous three times a day before meals  insulin lispro Injectable (ADMELOG) 2 Unit(s) SubCutaneous three times a day before meals  remdesivir  IVPB 100 milliGRAM(s) IV Intermittent once  senna 2 Tablet(s) Oral at bedtime  vancomycin  IVPB 1000 milliGRAM(s) IV Intermittent once
MEDICATIONS  (STANDING):  acetaminophen     Tablet .. 975 milliGRAM(s) Oral every 8 hours  ampicillin/sulbactam  IVPB      ampicillin/sulbactam  IVPB 3 Gram(s) IV Intermittent every 24 hours  aspirin  chewable 81 milliGRAM(s) Oral daily  chlorhexidine 2% Cloths 1 Application(s) Topical <User Schedule>  dextrose 5%. 1000 milliLiter(s) (50 mL/Hr) IV Continuous <Continuous>  dextrose 5%. 1000 milliLiter(s) (100 mL/Hr) IV Continuous <Continuous>  dextrose 50% Injectable 25 Gram(s) IV Push once  dextrose 50% Injectable 12.5 Gram(s) IV Push once  dextrose 50% Injectable 25 Gram(s) IV Push once  epoetin deidre-epbx (RETACRIT) Injectable 52189 Unit(s) IV Push once  glucagon  Injectable 1 milliGRAM(s) IntraMuscular once  insulin glargine Injectable (LANTUS) 7 Unit(s) SubCutaneous at bedtime  insulin lispro (ADMELOG) corrective regimen sliding scale   SubCutaneous at bedtime  insulin lispro (ADMELOG) corrective regimen sliding scale   SubCutaneous three times a day before meals  insulin lispro Injectable (ADMELOG) 2 Unit(s) SubCutaneous three times a day before meals  remdesivir  IVPB 100 milliGRAM(s) IV Intermittent once  senna 2 Tablet(s) Oral at bedtime  vancomycin  IVPB 1000 milliGRAM(s) IV Intermittent once
MEDICATIONS  (STANDING):  acetaminophen     Tablet .. 975 milliGRAM(s) Oral every 8 hours  ampicillin/sulbactam  IVPB      ampicillin/sulbactam  IVPB 3 Gram(s) IV Intermittent every 24 hours  aspirin  chewable 81 milliGRAM(s) Oral daily  chlorhexidine 2% Cloths 1 Application(s) Topical <User Schedule>  dextrose 5%. 1000 milliLiter(s) (50 mL/Hr) IV Continuous <Continuous>  dextrose 5%. 1000 milliLiter(s) (100 mL/Hr) IV Continuous <Continuous>  dextrose 50% Injectable 25 Gram(s) IV Push once  dextrose 50% Injectable 12.5 Gram(s) IV Push once  dextrose 50% Injectable 25 Gram(s) IV Push once  epoetin deidre-epbx (RETACRIT) Injectable 75801 Unit(s) IV Push once  glucagon  Injectable 1 milliGRAM(s) IntraMuscular once  insulin glargine Injectable (LANTUS) 7 Unit(s) SubCutaneous at bedtime  insulin lispro (ADMELOG) corrective regimen sliding scale   SubCutaneous at bedtime  insulin lispro (ADMELOG) corrective regimen sliding scale   SubCutaneous three times a day before meals  insulin lispro Injectable (ADMELOG) 2 Unit(s) SubCutaneous three times a day before meals  remdesivir  IVPB 100 milliGRAM(s) IV Intermittent once  senna 2 Tablet(s) Oral at bedtime  vancomycin  IVPB 1000 milliGRAM(s) IV Intermittent once
MEDICATIONS  (STANDING):  acetaminophen     Tablet .. 975 milliGRAM(s) Oral every 8 hours  amLODIPine   Tablet 10 milliGRAM(s) Oral daily  ampicillin/sulbactam  IVPB      ampicillin/sulbactam  IVPB 3 Gram(s) IV Intermittent every 24 hours  AQUAPHOR (petrolatum Ointment) 1 Application(s) Topical two times a day  aspirin  chewable 81 milliGRAM(s) Oral daily  atorvastatin 40 milliGRAM(s) Oral at bedtime  collagenase Ointment 1 Application(s) Topical two times a day  dextrose 5%. 1000 milliLiter(s) (50 mL/Hr) IV Continuous <Continuous>  dextrose 5%. 1000 milliLiter(s) (100 mL/Hr) IV Continuous <Continuous>  dextrose 50% Injectable 25 Gram(s) IV Push once  dextrose 50% Injectable 12.5 Gram(s) IV Push once  dextrose 50% Injectable 25 Gram(s) IV Push once  dorzolamide 2%/timolol 0.5% Ophthalmic Solution 1 Drop(s) Both EYES two times a day  epoetin deidre-epbx (RETACRIT) Injectable 36392 Unit(s) IV Push <User Schedule>  glucagon  Injectable 1 milliGRAM(s) IntraMuscular once  insulin glargine Injectable (LANTUS) 4 Unit(s) SubCutaneous at bedtime  insulin lispro (ADMELOG) corrective regimen sliding scale   SubCutaneous at bedtime  insulin lispro (ADMELOG) corrective regimen sliding scale   SubCutaneous three times a day before meals  senna 2 Tablet(s) Oral at bedtime  Sucroferric Oxyhydroxide 2500mg Tablet 2 Tablet(s) 2 Tablet(s) Oral three times a day
MEDICATIONS  (STANDING):  acetaminophen     Tablet .. 975 milliGRAM(s) Oral every 8 hours  amLODIPine   Tablet 10 milliGRAM(s) Oral daily  ampicillin/sulbactam  IVPB      ampicillin/sulbactam  IVPB 3 Gram(s) IV Intermittent every 24 hours  AQUAPHOR (petrolatum Ointment) 1 Application(s) Topical two times a day  aspirin  chewable 81 milliGRAM(s) Oral daily  atorvastatin 40 milliGRAM(s) Oral at bedtime  collagenase Ointment 1 Application(s) Topical two times a day  dextrose 5%. 1000 milliLiter(s) (50 mL/Hr) IV Continuous <Continuous>  dextrose 5%. 1000 milliLiter(s) (100 mL/Hr) IV Continuous <Continuous>  dextrose 50% Injectable 25 Gram(s) IV Push once  dextrose 50% Injectable 12.5 Gram(s) IV Push once  dextrose 50% Injectable 25 Gram(s) IV Push once  dorzolamide 2%/timolol 0.5% Ophthalmic Solution 1 Drop(s) Both EYES two times a day  epoetin deidre-epbx (RETACRIT) Injectable 89254 Unit(s) IV Push <User Schedule>  glucagon  Injectable 1 milliGRAM(s) IntraMuscular once  insulin glargine Injectable (LANTUS) 4 Unit(s) SubCutaneous at bedtime  insulin lispro (ADMELOG) corrective regimen sliding scale   SubCutaneous at bedtime  insulin lispro (ADMELOG) corrective regimen sliding scale   SubCutaneous three times a day before meals  senna 2 Tablet(s) Oral at bedtime  Sucroferric Oxyhydroxide 2500mg Tablet 2 Tablet(s) 2 Tablet(s) Oral three times a day
MEDICATIONS  (STANDING):  acetaminophen     Tablet .. 975 milliGRAM(s) Oral every 8 hours  ampicillin/sulbactam  IVPB      ampicillin/sulbactam  IVPB 3 Gram(s) IV Intermittent every 24 hours  aspirin  chewable 81 milliGRAM(s) Oral daily  chlorhexidine 2% Cloths 1 Application(s) Topical <User Schedule>  dextrose 5%. 1000 milliLiter(s) (50 mL/Hr) IV Continuous <Continuous>  dextrose 5%. 1000 milliLiter(s) (100 mL/Hr) IV Continuous <Continuous>  dextrose 50% Injectable 25 Gram(s) IV Push once  dextrose 50% Injectable 12.5 Gram(s) IV Push once  dextrose 50% Injectable 25 Gram(s) IV Push once  epoetin deidre-epbx (RETACRIT) Injectable 05103 Unit(s) IV Push once  glucagon  Injectable 1 milliGRAM(s) IntraMuscular once  insulin glargine Injectable (LANTUS) 7 Unit(s) SubCutaneous at bedtime  insulin lispro (ADMELOG) corrective regimen sliding scale   SubCutaneous at bedtime  insulin lispro (ADMELOG) corrective regimen sliding scale   SubCutaneous three times a day before meals  insulin lispro Injectable (ADMELOG) 2 Unit(s) SubCutaneous three times a day before meals  remdesivir  IVPB 100 milliGRAM(s) IV Intermittent once  senna 2 Tablet(s) Oral at bedtime  vancomycin  IVPB 1000 milliGRAM(s) IV Intermittent once
MEDICATIONS  (STANDING):  acetaminophen     Tablet .. 975 milliGRAM(s) Oral every 8 hours  ampicillin/sulbactam  IVPB      ampicillin/sulbactam  IVPB 3 Gram(s) IV Intermittent every 24 hours  aspirin  chewable 81 milliGRAM(s) Oral daily  chlorhexidine 2% Cloths 1 Application(s) Topical <User Schedule>  dextrose 5%. 1000 milliLiter(s) (50 mL/Hr) IV Continuous <Continuous>  dextrose 5%. 1000 milliLiter(s) (100 mL/Hr) IV Continuous <Continuous>  dextrose 50% Injectable 25 Gram(s) IV Push once  dextrose 50% Injectable 12.5 Gram(s) IV Push once  dextrose 50% Injectable 25 Gram(s) IV Push once  epoetin deidre-epbx (RETACRIT) Injectable 46762 Unit(s) IV Push once  glucagon  Injectable 1 milliGRAM(s) IntraMuscular once  insulin glargine Injectable (LANTUS) 7 Unit(s) SubCutaneous at bedtime  insulin lispro (ADMELOG) corrective regimen sliding scale   SubCutaneous at bedtime  insulin lispro (ADMELOG) corrective regimen sliding scale   SubCutaneous three times a day before meals  insulin lispro Injectable (ADMELOG) 2 Unit(s) SubCutaneous three times a day before meals  remdesivir  IVPB 100 milliGRAM(s) IV Intermittent once  senna 2 Tablet(s) Oral at bedtime  vancomycin  IVPB 1000 milliGRAM(s) IV Intermittent once
MEDICATIONS  (STANDING):  acetaminophen     Tablet .. 975 milliGRAM(s) Oral every 8 hours  ampicillin/sulbactam  IVPB      ampicillin/sulbactam  IVPB 3 Gram(s) IV Intermittent every 24 hours  aspirin  chewable 81 milliGRAM(s) Oral daily  chlorhexidine 2% Cloths 1 Application(s) Topical <User Schedule>  dextrose 5%. 1000 milliLiter(s) (50 mL/Hr) IV Continuous <Continuous>  dextrose 5%. 1000 milliLiter(s) (100 mL/Hr) IV Continuous <Continuous>  dextrose 50% Injectable 25 Gram(s) IV Push once  dextrose 50% Injectable 12.5 Gram(s) IV Push once  dextrose 50% Injectable 25 Gram(s) IV Push once  epoetin deidre-epbx (RETACRIT) Injectable 50777 Unit(s) IV Push once  glucagon  Injectable 1 milliGRAM(s) IntraMuscular once  insulin glargine Injectable (LANTUS) 7 Unit(s) SubCutaneous at bedtime  insulin lispro (ADMELOG) corrective regimen sliding scale   SubCutaneous at bedtime  insulin lispro (ADMELOG) corrective regimen sliding scale   SubCutaneous three times a day before meals  insulin lispro Injectable (ADMELOG) 2 Unit(s) SubCutaneous three times a day before meals  remdesivir  IVPB 100 milliGRAM(s) IV Intermittent once  senna 2 Tablet(s) Oral at bedtime  vancomycin  IVPB 1000 milliGRAM(s) IV Intermittent once
MEDICATIONS  (STANDING):  acetaminophen     Tablet .. 975 milliGRAM(s) Oral every 8 hours  ampicillin/sulbactam  IVPB      ampicillin/sulbactam  IVPB 3 Gram(s) IV Intermittent every 24 hours  aspirin  chewable 81 milliGRAM(s) Oral daily  chlorhexidine 2% Cloths 1 Application(s) Topical <User Schedule>  dextrose 5%. 1000 milliLiter(s) (50 mL/Hr) IV Continuous <Continuous>  dextrose 5%. 1000 milliLiter(s) (100 mL/Hr) IV Continuous <Continuous>  dextrose 50% Injectable 25 Gram(s) IV Push once  dextrose 50% Injectable 12.5 Gram(s) IV Push once  dextrose 50% Injectable 25 Gram(s) IV Push once  epoetin deidre-epbx (RETACRIT) Injectable 71304 Unit(s) IV Push once  glucagon  Injectable 1 milliGRAM(s) IntraMuscular once  insulin glargine Injectable (LANTUS) 7 Unit(s) SubCutaneous at bedtime  insulin lispro (ADMELOG) corrective regimen sliding scale   SubCutaneous at bedtime  insulin lispro (ADMELOG) corrective regimen sliding scale   SubCutaneous three times a day before meals  insulin lispro Injectable (ADMELOG) 2 Unit(s) SubCutaneous three times a day before meals  remdesivir  IVPB 100 milliGRAM(s) IV Intermittent once  senna 2 Tablet(s) Oral at bedtime  vancomycin  IVPB 1000 milliGRAM(s) IV Intermittent once
MEDICATIONS  (STANDING):  acetaminophen     Tablet .. 975 milliGRAM(s) Oral every 8 hours  ampicillin/sulbactam  IVPB      ampicillin/sulbactam  IVPB 3 Gram(s) IV Intermittent every 24 hours  aspirin  chewable 81 milliGRAM(s) Oral daily  chlorhexidine 2% Cloths 1 Application(s) Topical <User Schedule>  dextrose 5%. 1000 milliLiter(s) (50 mL/Hr) IV Continuous <Continuous>  dextrose 5%. 1000 milliLiter(s) (100 mL/Hr) IV Continuous <Continuous>  dextrose 50% Injectable 25 Gram(s) IV Push once  dextrose 50% Injectable 12.5 Gram(s) IV Push once  dextrose 50% Injectable 25 Gram(s) IV Push once  epoetin deidre-epbx (RETACRIT) Injectable 94786 Unit(s) IV Push once  glucagon  Injectable 1 milliGRAM(s) IntraMuscular once  insulin glargine Injectable (LANTUS) 7 Unit(s) SubCutaneous at bedtime  insulin lispro (ADMELOG) corrective regimen sliding scale   SubCutaneous at bedtime  insulin lispro (ADMELOG) corrective regimen sliding scale   SubCutaneous three times a day before meals  insulin lispro Injectable (ADMELOG) 2 Unit(s) SubCutaneous three times a day before meals  remdesivir  IVPB 100 milliGRAM(s) IV Intermittent once  senna 2 Tablet(s) Oral at bedtime  vancomycin  IVPB 1000 milliGRAM(s) IV Intermittent once

## 2023-04-14 NOTE — PROGRESS NOTE ADULT - ASSESSMENT
48 yo M sp multiple I&D of Right hand and forearm. Stable s/p I&D and Integra placement on 4/4    Plan:  - Pain control  - DVT ppx  - IS  - Continue Unasyn until 4/14  - Monitor BP  - Dialysis MWF with labs at dialysis, scheduled for 6 AM today   - Dressing with adaptic, kerlex, aquaphor   - Derm Recs: daily aquaphor application  - Plan for discharge 4/14, will set up VNS for home wound care, no home abx

## 2023-04-24 ENCOUNTER — APPOINTMENT (OUTPATIENT)
Dept: ORTHOPEDIC SURGERY | Facility: CLINIC | Age: 48
End: 2023-04-24
Payer: MEDICAID

## 2023-04-24 PROCEDURE — 99213 OFFICE O/P EST LOW 20 MIN: CPT

## 2023-04-24 RX ORDER — COLLAGENASE SANTYL 250 [ARB'U]/G
250 OINTMENT TOPICAL DAILY
Qty: 1 | Refills: 0 | Status: ACTIVE | COMMUNITY
Start: 2023-04-24 | End: 1900-01-01

## 2023-04-27 RX ORDER — OXYCODONE 5 MG/1
5 TABLET ORAL
Qty: 5 | Refills: 0 | Status: ACTIVE | COMMUNITY
Start: 2023-04-27 | End: 1900-01-01

## 2023-05-11 ENCOUNTER — APPOINTMENT (OUTPATIENT)
Dept: ORTHOPEDIC SURGERY | Facility: CLINIC | Age: 48
End: 2023-05-11
Payer: SELF-PAY

## 2023-05-11 PROCEDURE — 99024 POSTOP FOLLOW-UP VISIT: CPT

## 2023-06-14 ENCOUNTER — APPOINTMENT (OUTPATIENT)
Dept: ORTHOPEDIC SURGERY | Facility: CLINIC | Age: 48
End: 2023-06-14
Payer: SELF-PAY

## 2023-06-14 PROCEDURE — 99024 POSTOP FOLLOW-UP VISIT: CPT

## 2023-07-05 ENCOUNTER — APPOINTMENT (OUTPATIENT)
Dept: ORTHOPEDIC SURGERY | Facility: CLINIC | Age: 48
End: 2023-07-05
Payer: MEDICAID

## 2023-07-05 DIAGNOSIS — L08.9 LOCAL INFECTION OF THE SKIN AND SUBCUTANEOUS TISSUE, UNSPECIFIED: ICD-10-CM

## 2023-07-05 DIAGNOSIS — S68.119D COMPLETE TRAUMATIC METACARPOPHALANGEAL AMPUTATION OF UNSPECIFIED FINGER, SUBSEQUENT ENCOUNTER: ICD-10-CM

## 2023-07-05 PROCEDURE — 99024 POSTOP FOLLOW-UP VISIT: CPT

## 2023-07-19 NOTE — ASSESSMENT
visit per palliative care.  Patient sleeping, talked with son at bedside.  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  Patient's spouse more involved in [employer], patient's daughter has been the one who lets their  know of patient's hospitalizations/ visits.  Family is in process of making decision re. GOC; spouse, 3 daughters, 1 son.  Invited son to have RN call  for patient and/or family emotional, spiritual support.     [Patient undergoing HBO treatment for __________] : Patient undergoing HBO treatment for [unfilled] [Patient descended without problem for 9 minutes] : Patient descended without problem for 9 minutes [No dizziness or thirst] :  No dizziness or thirst [No ear problems] : No ear problems [Tolerating dive well] : Tolerating dive well [Vital signs stable] : Vital signs stable [No Chest Pain, shortness of breath] : No Chest Pain, shortness of breath [Respiratory Rate Stable] : Respiratory Rate Stable [No chest pain, shortness of breath, or ear pain] :  No chest pain, shortness of breath, or ear pain  [Tolerated Ascent well] : Tolerated Ascent well [A physician was present throughout the entire HBOT] : A physician was present throughout the entire HBOT [Vital Signs stable] : Vital Signs stable [No] : No [Continue Treatment Plan] : Continue treatment plan [Clinically Stable] : Clinically stable [0] : 0 out of 10

## 2023-09-19 ENCOUNTER — APPOINTMENT (OUTPATIENT)
Dept: ORTHOPEDIC SURGERY | Facility: CLINIC | Age: 48
End: 2023-09-19

## 2023-10-10 ENCOUNTER — INPATIENT (INPATIENT)
Facility: HOSPITAL | Age: 48
LOS: 13 days | Discharge: HOME | DRG: 474 | End: 2023-10-24
Attending: INTERNAL MEDICINE | Admitting: INTERNAL MEDICINE
Payer: MEDICAID

## 2023-10-10 ENCOUNTER — TRANSCRIPTION ENCOUNTER (OUTPATIENT)
Age: 48
End: 2023-10-10

## 2023-10-10 VITALS
WEIGHT: 130.07 LBS | RESPIRATION RATE: 18 BRPM | SYSTOLIC BLOOD PRESSURE: 66 MMHG | TEMPERATURE: 98 F | DIASTOLIC BLOOD PRESSURE: 41 MMHG | HEART RATE: 93 BPM | OXYGEN SATURATION: 90 %

## 2023-10-10 DIAGNOSIS — Z89.511 ACQUIRED ABSENCE OF RIGHT LEG BELOW KNEE: Chronic | ICD-10-CM

## 2023-10-10 DIAGNOSIS — N18.6 END STAGE RENAL DISEASE: ICD-10-CM

## 2023-10-10 DIAGNOSIS — E78.5 HYPERLIPIDEMIA, UNSPECIFIED: ICD-10-CM

## 2023-10-10 DIAGNOSIS — R00.0 TACHYCARDIA, UNSPECIFIED: ICD-10-CM

## 2023-10-10 DIAGNOSIS — I73.9 PERIPHERAL VASCULAR DISEASE, UNSPECIFIED: ICD-10-CM

## 2023-10-10 DIAGNOSIS — R79.89 OTHER SPECIFIED ABNORMAL FINDINGS OF BLOOD CHEMISTRY: ICD-10-CM

## 2023-10-10 DIAGNOSIS — T87.89 OTHER COMPLICATIONS OF AMPUTATION STUMP: ICD-10-CM

## 2023-10-10 DIAGNOSIS — E86.0 DEHYDRATION: ICD-10-CM

## 2023-10-10 DIAGNOSIS — E11.9 TYPE 2 DIABETES MELLITUS WITHOUT COMPLICATIONS: ICD-10-CM

## 2023-10-10 DIAGNOSIS — I77.0 ARTERIOVENOUS FISTULA, ACQUIRED: Chronic | ICD-10-CM

## 2023-10-10 DIAGNOSIS — Z29.9 ENCOUNTER FOR PROPHYLACTIC MEASURES, UNSPECIFIED: ICD-10-CM

## 2023-10-10 DIAGNOSIS — A41.9 SEPSIS, UNSPECIFIED ORGANISM: ICD-10-CM

## 2023-10-10 DIAGNOSIS — T14.8XXA OTHER INJURY OF UNSPECIFIED BODY REGION, INITIAL ENCOUNTER: ICD-10-CM

## 2023-10-10 DIAGNOSIS — S81.802A UNSPECIFIED OPEN WOUND, LEFT LOWER LEG, INITIAL ENCOUNTER: ICD-10-CM

## 2023-10-10 DIAGNOSIS — I95.9 HYPOTENSION, UNSPECIFIED: ICD-10-CM

## 2023-10-10 LAB
ALBUMIN SERPL ELPH-MCNC: 2.4 G/DL — LOW (ref 3.3–5)
ALP SERPL-CCNC: 155 U/L — HIGH (ref 40–120)
ALT FLD-CCNC: 27 U/L — SIGNIFICANT CHANGE UP (ref 12–78)
ANION GAP SERPL CALC-SCNC: 15 MMOL/L — SIGNIFICANT CHANGE UP (ref 5–17)
APTT BLD: 29.2 SEC — SIGNIFICANT CHANGE UP (ref 24.5–35.6)
AST SERPL-CCNC: 27 U/L — SIGNIFICANT CHANGE UP (ref 15–37)
BASOPHILS # BLD AUTO: 0.1 K/UL — SIGNIFICANT CHANGE UP (ref 0–0.2)
BASOPHILS NFR BLD AUTO: 0.5 % — SIGNIFICANT CHANGE UP (ref 0–2)
BILIRUB SERPL-MCNC: 0.7 MG/DL — SIGNIFICANT CHANGE UP (ref 0.2–1.2)
BLD GP AB SCN SERPL QL: SIGNIFICANT CHANGE UP
BUN SERPL-MCNC: 30 MG/DL — HIGH (ref 7–23)
CALCIUM SERPL-MCNC: 9.3 MG/DL — SIGNIFICANT CHANGE UP (ref 8.5–10.1)
CHLORIDE SERPL-SCNC: 96 MMOL/L — SIGNIFICANT CHANGE UP (ref 96–108)
CO2 SERPL-SCNC: 25 MMOL/L — SIGNIFICANT CHANGE UP (ref 22–31)
CREAT SERPL-MCNC: 8.2 MG/DL — HIGH (ref 0.5–1.3)
EGFR: 7 ML/MIN/1.73M2 — LOW
EOSINOPHIL # BLD AUTO: 0.04 K/UL — SIGNIFICANT CHANGE UP (ref 0–0.5)
EOSINOPHIL NFR BLD AUTO: 0.2 % — SIGNIFICANT CHANGE UP (ref 0–6)
FLUAV AG NPH QL: SIGNIFICANT CHANGE UP
FLUBV AG NPH QL: SIGNIFICANT CHANGE UP
GLUCOSE SERPL-MCNC: 323 MG/DL — HIGH (ref 70–99)
HCT VFR BLD CALC: 33.5 % — LOW (ref 39–50)
HGB BLD-MCNC: 10.3 G/DL — LOW (ref 13–17)
IMM GRANULOCYTES NFR BLD AUTO: 1.2 % — HIGH (ref 0–0.9)
INR BLD: 1.29 RATIO — HIGH (ref 0.85–1.18)
LACTATE SERPL-SCNC: 1.1 MMOL/L — SIGNIFICANT CHANGE UP (ref 0.7–2)
LYMPHOCYTES # BLD AUTO: 1.55 K/UL — SIGNIFICANT CHANGE UP (ref 1–3.3)
LYMPHOCYTES # BLD AUTO: 7.9 % — LOW (ref 13–44)
MANUAL SMEAR VERIFICATION: SIGNIFICANT CHANGE UP
MCHC RBC-ENTMCNC: 30.6 PG — SIGNIFICANT CHANGE UP (ref 27–34)
MCHC RBC-ENTMCNC: 30.7 GM/DL — LOW (ref 32–36)
MCV RBC AUTO: 99.4 FL — SIGNIFICANT CHANGE UP (ref 80–100)
MONOCYTES # BLD AUTO: 1.63 K/UL — HIGH (ref 0–0.9)
MONOCYTES NFR BLD AUTO: 8.3 % — SIGNIFICANT CHANGE UP (ref 2–14)
NEUTROPHILS # BLD AUTO: 16.05 K/UL — HIGH (ref 1.8–7.4)
NEUTROPHILS NFR BLD AUTO: 81.9 % — HIGH (ref 43–77)
NRBC # BLD: 0 /100 WBCS — SIGNIFICANT CHANGE UP (ref 0–0)
PLAT MORPH BLD: NORMAL — SIGNIFICANT CHANGE UP
PLATELET # BLD AUTO: 395 K/UL — SIGNIFICANT CHANGE UP (ref 150–400)
POTASSIUM SERPL-MCNC: 3.5 MMOL/L — SIGNIFICANT CHANGE UP (ref 3.5–5.3)
POTASSIUM SERPL-SCNC: 3.5 MMOL/L — SIGNIFICANT CHANGE UP (ref 3.5–5.3)
PROT SERPL-MCNC: 8.7 G/DL — HIGH (ref 6–8.3)
PROTHROM AB SERPL-ACNC: 15 SEC — HIGH (ref 9.5–13)
RBC # BLD: 3.37 M/UL — LOW (ref 4.2–5.8)
RBC # FLD: 14.1 % — SIGNIFICANT CHANGE UP (ref 10.3–14.5)
RBC BLD AUTO: SIGNIFICANT CHANGE UP
RSV RNA NPH QL NAA+NON-PROBE: SIGNIFICANT CHANGE UP
SARS-COV-2 RNA SPEC QL NAA+PROBE: SIGNIFICANT CHANGE UP
SODIUM SERPL-SCNC: 136 MMOL/L — SIGNIFICANT CHANGE UP (ref 135–145)
WBC # BLD: 19.6 K/UL — HIGH (ref 3.8–10.5)
WBC # FLD AUTO: 19.6 K/UL — HIGH (ref 3.8–10.5)

## 2023-10-10 PROCEDURE — 93010 ELECTROCARDIOGRAM REPORT: CPT

## 2023-10-10 PROCEDURE — 71045 X-RAY EXAM CHEST 1 VIEW: CPT | Mod: 26

## 2023-10-10 PROCEDURE — 99285 EMERGENCY DEPT VISIT HI MDM: CPT | Mod: 25

## 2023-10-10 PROCEDURE — 36000 PLACE NEEDLE IN VEIN: CPT

## 2023-10-10 PROCEDURE — 73700 CT LOWER EXTREMITY W/O DYE: CPT | Mod: 26,LT

## 2023-10-10 PROCEDURE — 99221 1ST HOSP IP/OBS SF/LOW 40: CPT

## 2023-10-10 RX ORDER — DORZOLAMIDE HYDROCHLORIDE TIMOLOL MALEATE 20; 5 MG/ML; MG/ML
1 SOLUTION/ DROPS OPHTHALMIC
Refills: 0 | Status: DISCONTINUED | OUTPATIENT
Start: 2023-10-10 | End: 2023-10-11

## 2023-10-10 RX ORDER — GLUCAGON INJECTION, SOLUTION 0.5 MG/.1ML
1 INJECTION, SOLUTION SUBCUTANEOUS ONCE
Refills: 0 | Status: DISCONTINUED | OUTPATIENT
Start: 2023-10-10 | End: 2023-10-11

## 2023-10-10 RX ORDER — VANCOMYCIN HCL 1 G
1000 VIAL (EA) INTRAVENOUS ONCE
Refills: 0 | Status: COMPLETED | OUTPATIENT
Start: 2023-10-10 | End: 2023-10-10

## 2023-10-10 RX ORDER — MIDODRINE HYDROCHLORIDE 2.5 MG/1
10 TABLET ORAL THREE TIMES A DAY
Refills: 0 | Status: DISCONTINUED | OUTPATIENT
Start: 2023-10-10 | End: 2023-10-11

## 2023-10-10 RX ORDER — SODIUM CHLORIDE 9 MG/ML
1000 INJECTION, SOLUTION INTRAVENOUS
Refills: 0 | Status: DISCONTINUED | OUTPATIENT
Start: 2023-10-10 | End: 2023-10-11

## 2023-10-10 RX ORDER — DEXTROSE 50 % IN WATER 50 %
15 SYRINGE (ML) INTRAVENOUS ONCE
Refills: 0 | Status: DISCONTINUED | OUTPATIENT
Start: 2023-10-10 | End: 2023-10-11

## 2023-10-10 RX ORDER — PANTOPRAZOLE SODIUM 20 MG/1
40 TABLET, DELAYED RELEASE ORAL
Refills: 0 | Status: DISCONTINUED | OUTPATIENT
Start: 2023-10-10 | End: 2023-10-11

## 2023-10-10 RX ORDER — SODIUM CHLORIDE 9 MG/ML
500 INJECTION INTRAMUSCULAR; INTRAVENOUS; SUBCUTANEOUS ONCE
Refills: 0 | Status: COMPLETED | OUTPATIENT
Start: 2023-10-10 | End: 2023-10-10

## 2023-10-10 RX ORDER — DEXTROSE 50 % IN WATER 50 %
25 SYRINGE (ML) INTRAVENOUS ONCE
Refills: 0 | Status: DISCONTINUED | OUTPATIENT
Start: 2023-10-10 | End: 2023-10-11

## 2023-10-10 RX ORDER — PIPERACILLIN AND TAZOBACTAM 4; .5 G/20ML; G/20ML
3.38 INJECTION, POWDER, LYOPHILIZED, FOR SOLUTION INTRAVENOUS ONCE
Refills: 0 | Status: COMPLETED | OUTPATIENT
Start: 2023-10-10 | End: 2023-10-10

## 2023-10-10 RX ORDER — SENNA PLUS 8.6 MG/1
2 TABLET ORAL AT BEDTIME
Refills: 0 | Status: DISCONTINUED | OUTPATIENT
Start: 2023-10-10 | End: 2023-10-11

## 2023-10-10 RX ORDER — PIPERACILLIN AND TAZOBACTAM 4; .5 G/20ML; G/20ML
3.38 INJECTION, POWDER, LYOPHILIZED, FOR SOLUTION INTRAVENOUS EVERY 12 HOURS
Refills: 0 | Status: DISCONTINUED | OUTPATIENT
Start: 2023-10-10 | End: 2023-10-11

## 2023-10-10 RX ORDER — ACETAMINOPHEN 500 MG
650 TABLET ORAL EVERY 6 HOURS
Refills: 0 | Status: DISCONTINUED | OUTPATIENT
Start: 2023-10-10 | End: 2023-10-11

## 2023-10-10 RX ORDER — LANOLIN ALCOHOL/MO/W.PET/CERES
3 CREAM (GRAM) TOPICAL AT BEDTIME
Refills: 0 | Status: DISCONTINUED | OUTPATIENT
Start: 2023-10-10 | End: 2023-10-11

## 2023-10-10 RX ORDER — ONDANSETRON 8 MG/1
4 TABLET, FILM COATED ORAL EVERY 8 HOURS
Refills: 0 | Status: DISCONTINUED | OUTPATIENT
Start: 2023-10-10 | End: 2023-10-11

## 2023-10-10 RX ORDER — SODIUM CHLORIDE 9 MG/ML
1000 INJECTION INTRAMUSCULAR; INTRAVENOUS; SUBCUTANEOUS ONCE
Refills: 0 | Status: COMPLETED | OUTPATIENT
Start: 2023-10-10 | End: 2023-10-10

## 2023-10-10 RX ORDER — HEPARIN SODIUM 5000 [USP'U]/ML
5000 INJECTION INTRAVENOUS; SUBCUTANEOUS EVERY 12 HOURS
Refills: 0 | Status: DISCONTINUED | OUTPATIENT
Start: 2023-10-10 | End: 2023-10-11

## 2023-10-10 RX ORDER — LACTOBACILLUS ACIDOPHILUS 100MM CELL
1 CAPSULE ORAL EVERY 12 HOURS
Refills: 0 | Status: DISCONTINUED | OUTPATIENT
Start: 2023-10-10 | End: 2023-10-11

## 2023-10-10 RX ORDER — SODIUM CHLORIDE 9 MG/ML
250 INJECTION INTRAMUSCULAR; INTRAVENOUS; SUBCUTANEOUS ONCE
Refills: 0 | Status: COMPLETED | OUTPATIENT
Start: 2023-10-10 | End: 2023-10-10

## 2023-10-10 RX ORDER — COLLAGENASE CLOSTRIDIUM HIST. 250 UNIT/G
1 OINTMENT (GRAM) TOPICAL DAILY
Refills: 0 | Status: DISCONTINUED | OUTPATIENT
Start: 2023-10-10 | End: 2023-10-11

## 2023-10-10 RX ORDER — DEXTROSE 50 % IN WATER 50 %
12.5 SYRINGE (ML) INTRAVENOUS ONCE
Refills: 0 | Status: DISCONTINUED | OUTPATIENT
Start: 2023-10-10 | End: 2023-10-11

## 2023-10-10 RX ORDER — INSULIN LISPRO 100/ML
VIAL (ML) SUBCUTANEOUS EVERY 6 HOURS
Refills: 0 | Status: DISCONTINUED | OUTPATIENT
Start: 2023-10-10 | End: 2023-10-11

## 2023-10-10 RX ORDER — ATORVASTATIN CALCIUM 80 MG/1
40 TABLET, FILM COATED ORAL AT BEDTIME
Refills: 0 | Status: DISCONTINUED | OUTPATIENT
Start: 2023-10-10 | End: 2023-10-11

## 2023-10-10 RX ADMIN — Medication 650 MILLIGRAM(S): at 22:20

## 2023-10-10 RX ADMIN — Medication 250 MILLIGRAM(S): at 12:25

## 2023-10-10 RX ADMIN — MIDODRINE HYDROCHLORIDE 10 MILLIGRAM(S): 2.5 TABLET ORAL at 13:33

## 2023-10-10 RX ADMIN — Medication 4: at 18:34

## 2023-10-10 RX ADMIN — ATORVASTATIN CALCIUM 40 MILLIGRAM(S): 80 TABLET, FILM COATED ORAL at 22:21

## 2023-10-10 RX ADMIN — SODIUM CHLORIDE 1000 MILLILITER(S): 9 INJECTION INTRAMUSCULAR; INTRAVENOUS; SUBCUTANEOUS at 11:10

## 2023-10-10 RX ADMIN — HEPARIN SODIUM 5000 UNIT(S): 5000 INJECTION INTRAVENOUS; SUBCUTANEOUS at 18:33

## 2023-10-10 RX ADMIN — DORZOLAMIDE HYDROCHLORIDE TIMOLOL MALEATE 1 DROP(S): 20; 5 SOLUTION/ DROPS OPHTHALMIC at 18:34

## 2023-10-10 RX ADMIN — PIPERACILLIN AND TAZOBACTAM 200 GRAM(S): 4; .5 INJECTION, POWDER, LYOPHILIZED, FOR SOLUTION INTRAVENOUS at 12:12

## 2023-10-10 RX ADMIN — SODIUM CHLORIDE 1000 MILLILITER(S): 9 INJECTION INTRAMUSCULAR; INTRAVENOUS; SUBCUTANEOUS at 12:57

## 2023-10-10 RX ADMIN — Medication 1 TABLET(S): at 18:33

## 2023-10-10 RX ADMIN — SODIUM CHLORIDE 250 MILLILITER(S): 9 INJECTION INTRAMUSCULAR; INTRAVENOUS; SUBCUTANEOUS at 17:01

## 2023-10-10 NOTE — H&P ADULT - ASSESSMENT
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 Sin/Dr. Singh) Presents to ED [facility] 10/10 with weakness for 2 weeks. 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 Wound care consult and ID consults are requested .

## 2023-10-10 NOTE — ED PROVIDER NOTE - OBJECTIVE STATEMENT
49yo male with weakness for 2 weeks. pt is dialysis patient M/W/F last dialysis was yesterday but pt has been feeling weak for the last 2 weeks, no cough, fever, chills, no vomiting or diarrhea, pt also has had an open wound under his left thigh that has been neglected for the last few weeks, draining pus and with redness

## 2023-10-10 NOTE — H&P ADULT - TIME BILLING
75minutes spent on this visit, 50% visit time spent in care co-ordination with other attendings and counselling patient ,writing admission orders ( see complete and current orders and order section) ,requesting necessary consults ,informing family about status & plan of care .I have discussed care plan with [facility] wellness/admitting /nursing   department ,outpatient PCP , hospital consultants , ER physician & med staff .

## 2023-10-10 NOTE — H&P ADULT - NSICDXPASTMEDICALHX_GEN_ALL_CORE_FT
PAST MEDICAL HISTORY:  Diabetes mellitus     ESRD on dialysis     H/O hypotension      PAST MEDICAL HISTORY:  Anemia of chronic disease     Diabetes mellitus     ESRD on dialysis     Gangrene of finger of right hand     H/O hypotension     Leg wound, left     PVD (peripheral vascular disease)     Steal syndrome dialysis vascular access      PAST MEDICAL HISTORY:  Anemia of chronic disease     Constipation     Diabetes mellitus     ESRD on dialysis     Gangrene of finger of right hand     H/O hypotension     HLD (hyperlipidemia)     Leg wound, left     PVD (peripheral vascular disease)     Steal syndrome dialysis vascular access

## 2023-10-10 NOTE — PATIENT PROFILE ADULT - FALL HARM RISK - HARM RISK INTERVENTIONS

## 2023-10-10 NOTE — CONSULT NOTE ADULT - NSCONSULTADDITIONALINFOA_GEN_ALL_CORE
MEDICATIONS  (STANDING):  atorvastatin 40 milliGRAM(s) Oral at bedtime  dextrose 50% Injectable 25 Gram(s) IV Push once  dextrose 50% Injectable 25 Gram(s) IV Push once  dextrose 50% Injectable 12.5 Gram(s) IV Push once  dorzolamide 2%/timolol 0.5% Ophthalmic Solution 1 Drop(s) Both EYES two times a day  glucagon  Injectable 1 milliGRAM(s) IntraMuscular once  insulin lispro (ADMELOG) corrective regimen sliding scale   SubCutaneous every 6 hours  lactated ringers. 1000 milliLiter(s) (75 mL/Hr) IV Continuous <Continuous>  lactobacillus acidophilus 1 Tablet(s) Oral every 12 hours  midodrine. 10 milliGRAM(s) Oral three times a day  pantoprazole    Tablet 40 milliGRAM(s) Oral before breakfast  phenylephrine    Infusion 0.5 MICROgram(s)/kG/Min (11.1 mL/Hr) IV Continuous <Continuous>  piperacillin/tazobactam IVPB.. 3.375 Gram(s) IV Intermittent every 12 hours  senna 2 Tablet(s) Oral at bedtime  sodium chloride 0.9%. 1000 milliLiter(s) (50 mL/Hr) IV Continuous <Continuous>  vancomycin  IVPB 500 milliGRAM(s) IV Intermittent once

## 2023-10-10 NOTE — PROVIDER CONTACT NOTE (OTHER) - ASSESSMENT
Pt with 100.9 rectal temp and 90/40 BP. No s/s's of distress. Denies pain and discomfort. PRN tylenol administered. Pt admitted for wound infection. Sepsis workup. Blood cultures drawn. On zosyn.

## 2023-10-10 NOTE — CONSULT NOTE ADULT - SUBJECTIVE AND OBJECTIVE BOX
Chief Complaint: left posterior knee ulcer    HPI: 47 yo HM, admitted for weakness and h/o ESRD/on hemodialysis, asked to see pt regarding his left posterior knee ulcer. Pt is s/p bilateral BKA and using bilateral prosthetics. Pt now with a press. ulcer from his prosthetic LLE. No signs of infection/erythema/edema/cellulitis.    PAST MEDICAL & SURGICAL HISTORY:  ESRD on dialysis      H/O hypotension      Diabetes mellitus      Leg wound, left      Steal syndrome dialysis vascular access      Gangrene of finger of right hand      PVD (peripheral vascular disease)      Anemia of chronic disease      Constipation      HLD (hyperlipidemia)      S/P BKA (below knee amputation) bilateral      AV fistula          Allergies    No Known Drug Allergies  [place] (Rash)    Intolerances        MEDICATIONS  (STANDING):  atorvastatin 40 milliGRAM(s) Oral at bedtime  collagenase Ointment 1 Application(s) Topical daily  dextrose 5%. 1000 milliLiter(s) (50 mL/Hr) IV Continuous <Continuous>  dextrose 5%. 1000 milliLiter(s) (100 mL/Hr) IV Continuous <Continuous>  dextrose 50% Injectable 12.5 Gram(s) IV Push once  dextrose 50% Injectable 25 Gram(s) IV Push once  dextrose 50% Injectable 25 Gram(s) IV Push once  dorzolamide 2%/timolol 0.5% Ophthalmic Solution 1 Drop(s) Both EYES two times a day  glucagon  Injectable 1 milliGRAM(s) IntraMuscular once  insulin lispro (ADMELOG) corrective regimen sliding scale   SubCutaneous every 6 hours  lactobacillus acidophilus 1 Tablet(s) Oral every 12 hours  midodrine. 10 milliGRAM(s) Oral three times a day  pantoprazole    Tablet 40 milliGRAM(s) Oral before breakfast  piperacillin/tazobactam IVPB.. 3.375 Gram(s) IV Intermittent every 12 hours  senna 2 Tablet(s) Oral at bedtime    MEDICATIONS  (PRN):  acetaminophen     Tablet .. 650 milliGRAM(s) Oral every 6 hours PRN Temp greater or equal to 38C (100.4F), Mild Pain (1 - 3)  aluminum hydroxide/magnesium hydroxide/simethicone Suspension 30 milliLiter(s) Oral every 4 hours PRN Dyspepsia  dextrose Oral Gel 15 Gram(s) Oral once PRN Blood Glucose LESS THAN 70 milliGRAM(s)/deciliter  melatonin 3 milliGRAM(s) Oral at bedtime PRN Insomnia  ondansetron Injectable 4 milliGRAM(s) IV Push every 8 hours PRN Nausea and/or Vomiting      FAMILY HISTORY:          ROS:  CONSTITUTIONAL: No fever, weight loss  EYES: No eye pain, visual disturbances, or discharge  ENMT:  No difficulty hearing, tinnitus, vertigo; No sinus or throat pain  NECK: No pain or stiffness  RESPIRATORY: No cough, wheezing, chills or hemoptysis; No shortness of breath  CARDIOVASCULAR: No chest pain, palpitations, dizziness, or leg swelling  GASTROINTESTINAL: No abdominal or epigastric pain. No nausea, vomiting, or hematemesis; No diarrhea or constipation. No melena or hematochezia.  GENITOURINARY: No dysuria, frequency, hematuria, or incontinence  NEUROLOGICAL: No headaches, memory loss, loss of strength, numbness, or tremors  SKIN: No itching, burning, rashes, or lesions   LYMPH NODES: No enlarged glands  ENDOCRINE: No heat or cold intolerance; No hair loss  MUSCULOSKELETAL: S/p bilat. BKA  PSYCHIATRIC: No depression, anxiety, mood swings, or difficulty sleeping  HEME/LYMPH: No easy bruising, or bleeding gums  ALLERGY AND IMMUNOLOGIC: No hives or eczema    PHYSICAL EXAM-      Weight (kg): 59 (10-10 @ 09:17)  Vital Signs Last 24 Hrs  T(C): 37.1 (10 Oct 2023 12:30), Max: 37.7 (10 Oct 2023 09:38)  T(F): 98.7 (10 Oct 2023 12:30), Max: 99.9 (10 Oct 2023 09:38)  HR: 85 (10 Oct 2023 15:44) (81 - 138)  BP: 100/52 (10 Oct 2023 15:44) (66/41 - 100/52)  BP(mean): --  RR: 17 (10 Oct 2023 15:44) (17 - 20)  SpO2: 100% (10 Oct 2023 15:44) (90% - 100%)    Parameters below as of 10 Oct 2023 15:44  Patient On (Oxygen Delivery Method): room air        Constitutional: well developed, well nourished, no apparent distress, alert, oriented x 3.     Left posterior knee-open ulcer 3x 2.5cmx 1.2 cm; Base-covered 50% by yellow slough; no cellulitis/edema/erythema                          10.3   19.60 )-----------( 395      ( 10 Oct 2023 11:15 )             33.5     10-10    136  |  96  |  30<H>  ----------------------------<  323<H>  3.5   |  25  |  8.20<H>    Ca    9.3      10 Oct 2023 11:15    TPro  8.7<H>  /  Alb  2.4<L>  /  TBili  0.7  /  DBili  x   /  AST  27  /  ALT  27  /  AlkPhos  155<H>  10-10      Radiology

## 2023-10-10 NOTE — ED ADULT NURSE NOTE - OBJECTIVE STATEMENT
Patient received via triage.  Patient is a long-time dialysis patient, he had dialysis yesterday.  He comes in with c/o increasing weakness.  He has a dialysis shunt right upper extremity positive bruit and thrill.  Patient has bilateral BKAs.  There is an unstagable  wound on the back pf the left thigh, covered in slough.  His buttocks are beefy red.  Rectal temp 99.9.  Patient was very hypotensive in triage, reassessed after placement in room 15, now systolic in the 90s.  Patient is alert and oriented, he is able to follow commands and make his needs known.  Patient has poor peripheral access. Two RNs unable to place IV.  Dr. Vizcarra aware, she will attempt IV placement.

## 2023-10-10 NOTE — CONSULT NOTE ADULT - SUBJECTIVE AND OBJECTIVE BOX
Patient is a 48y old  Male who presents with a chief complaint of weakness (10 Oct 2023 17:31)      HPI:  49 yo malewith PMH of DM2, b/l BKA, ESRD (on HD MWF) ,infection  right third finger 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 Sin/Dr. Singh) Presents to ED [facility] 10/10 with weakness for 2 weeks. 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 Wound care consult and ID consults are requested . (10 Oct 2023 12:52)      Asked to see patient for ID Consult    PAST MEDICAL & SURGICAL HISTORY:  ESRD on dialysis      H/O hypotension      Diabetes mellitus      Leg wound, left      Steal syndrome dialysis vascular access      Gangrene of finger of right hand      PVD (peripheral vascular disease)      Anemia of chronic disease      Constipation      HLD (hyperlipidemia)      S/P BKA (below knee amputation) bilateral      AV fistula          Allergies    No Known Drug Allergies  [place] (Rash)    Intolerances        REVIEW OF SYSTEMS:  All systems below were reviewed and are negative [  ]  HEENT:  ID:  Pulmonary:  Cardiac:  GI:  Renal:  Musculoskeletal:  All other systems above were reviewed and are negative   [  ]    HOME MEDICATIONS:    MEDICATIONS  (STANDING):  atorvastatin 40 milliGRAM(s) Oral at bedtime  collagenase Ointment 1 Application(s) Topical daily  dextrose 5%. 1000 milliLiter(s) (100 mL/Hr) IV Continuous <Continuous>  dextrose 5%. 1000 milliLiter(s) (50 mL/Hr) IV Continuous <Continuous>  dextrose 50% Injectable 25 Gram(s) IV Push once  dextrose 50% Injectable 12.5 Gram(s) IV Push once  dextrose 50% Injectable 25 Gram(s) IV Push once  dorzolamide 2%/timolol 0.5% Ophthalmic Solution 1 Drop(s) Both EYES two times a day  glucagon  Injectable 1 milliGRAM(s) IntraMuscular once  heparin   Injectable 5000 Unit(s) SubCutaneous every 12 hours  insulin lispro (ADMELOG) corrective regimen sliding scale   SubCutaneous every 6 hours  lactobacillus acidophilus 1 Tablet(s) Oral every 12 hours  midodrine. 10 milliGRAM(s) Oral three times a day  pantoprazole    Tablet 40 milliGRAM(s) Oral before breakfast  piperacillin/tazobactam IVPB.. 3.375 Gram(s) IV Intermittent every 12 hours  senna 2 Tablet(s) Oral at bedtime    MEDICATIONS  (PRN):  acetaminophen     Tablet .. 650 milliGRAM(s) Oral every 6 hours PRN Temp greater or equal to 38C (100.4F), Mild Pain (1 - 3)  aluminum hydroxide/magnesium hydroxide/simethicone Suspension 30 milliLiter(s) Oral every 4 hours PRN Dyspepsia  dextrose Oral Gel 15 Gram(s) Oral once PRN Blood Glucose LESS THAN 70 milliGRAM(s)/deciliter  melatonin 3 milliGRAM(s) Oral at bedtime PRN Insomnia  ondansetron Injectable 4 milliGRAM(s) IV Push every 8 hours PRN Nausea and/or Vomiting      Vital Signs Last 24 Hrs  T(C): 36.9 (10 Oct 2023 16:16), Max: 37.7 (10 Oct 2023 09:38)  T(F): 98.4 (10 Oct 2023 16:16), Max: 99.9 (10 Oct 2023 09:38)  HR: 85 (10 Oct 2023 18:00) (81 - 138)  BP: 110/59 (10 Oct 2023 18:00) (66/41 - 110/59)  BP(mean): --  RR: 17 (10 Oct 2023 18:00) (17 - 20)  SpO2: 99% (10 Oct 2023 18:00) (90% - 100%)    Parameters below as of 10 Oct 2023 18:00  Patient On (Oxygen Delivery Method): room air        PHYSICAL EXAM:  HEENT:  Neck:  Lungs:  Heart:  Abdomen:  Genital/ Rectal:  Extremities:  Neurologic:  Vascular:    I&O's Summary      LABORATORY:                          10.3   19.60 )-----------( 395      ( 10 Oct 2023 11:15 )             33.5           10-10    136  |  96  |  30<H>  ----------------------------<  323<H>  3.5   |  25  |  8.20<H>    Ca    9.3      10 Oct 2023 11:15    TPro  8.7<H>  /  Alb  2.4<L>  /  TBili  0.7  /  DBili  x   /  AST  27  /  ALT  27  /  AlkPhos  155<H>  10-10          LABORATORY:    CBC Full  -  ( 10 Oct 2023 11:15 )  WBC Count : 19.60 K/uL  RBC Count : 3.37 M/uL  Hemoglobin : 10.3 g/dL  Hematocrit : 33.5 %  Platelet Count - Automated : 395 K/uL  Mean Cell Volume : 99.4 fl  Mean Cell Hemoglobin : 30.6 pg  Mean Cell Hemoglobin Concentration : 30.7 gm/dL  Auto Neutrophil # : 16.05 K/uL  Auto Lymphocyte # : 1.55 K/uL  Auto Monocyte # : 1.63 K/uL  Auto Eosinophil # : 0.04 K/uL  Auto Basophil # : 0.10 K/uL  Auto Neutrophil % : 81.9 %  Auto Lymphocyte % : 7.9 %  Auto Monocyte % : 8.3 %  Auto Eosinophil % : 0.2 %  Auto Basophil % : 0.5 %          10-10    136  |  96  |  30<H>  ----------------------------<  323<H>  3.5   |  25  |  8.20<H>    Ca    9.3      10 Oct 2023 11:15    TPro  8.7<H>  /  Alb  2.4<L>  /  TBili  0.7  /  DBili  x   /  AST  27  /  ALT  27  /  AlkPhos  155<H>  10-10      Assessment and Plan:    1. Left posterior wound  2. Sepsis      Follow wound culture  Surgery evaluation for possible need of debridement  Continue IV Zosyn and Vanco    Thank you                                              Patient is a 48y old  Male who presents with a chief complaint of weakness (10 Oct 2023 17:31)        The patient is a 49 yo male with a PMH of DM2, bilateral BKA, ESRD (on HD MWF) ,infection  right third finger 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 Sin/Dr. Singh) who came to the  ED [facility] with weakness for 2 weeks. In the ED he was hypotensive (BP 66/40), fever of 100.8F and high WBC of 19K. The patient had a small wound on the left posterior thigh for the last one month which was reportedly draining pus. He was seem by wound care consult. He was given IV Zosyn and Vancomycin in the ED.           PAST MEDICAL & SURGICAL HISTORY:  ESRD on dialysis      H/O hypotension      Diabetes mellitus      Leg wound, left      Steal syndrome dialysis vascular access      Gangrene of finger of right hand      PVD (peripheral vascular disease)      Anemia of chronic disease      Constipation      HLD (hyperlipidemia)      S/P BKA (below knee amputation) bilateral      AV fistula          Allergies    No Known Drug Allergies  [place] (Rash)    Intolerances        REVIEW OF SYSTEMS:  No cough or SOB  No diarrhea.    HOME MEDICATIONS:    MEDICATIONS  (STANDING):  atorvastatin 40 milliGRAM(s) Oral at bedtime  collagenase Ointment 1 Application(s) Topical daily  dextrose 5%. 1000 milliLiter(s) (100 mL/Hr) IV Continuous <Continuous>  dextrose 5%. 1000 milliLiter(s) (50 mL/Hr) IV Continuous <Continuous>  dextrose 50% Injectable 25 Gram(s) IV Push once  dextrose 50% Injectable 12.5 Gram(s) IV Push once  dextrose 50% Injectable 25 Gram(s) IV Push once  dorzolamide 2%/timolol 0.5% Ophthalmic Solution 1 Drop(s) Both EYES two times a day  glucagon  Injectable 1 milliGRAM(s) IntraMuscular once  heparin   Injectable 5000 Unit(s) SubCutaneous every 12 hours  insulin lispro (ADMELOG) corrective regimen sliding scale   SubCutaneous every 6 hours  lactobacillus acidophilus 1 Tablet(s) Oral every 12 hours  midodrine. 10 milliGRAM(s) Oral three times a day  pantoprazole    Tablet 40 milliGRAM(s) Oral before breakfast  piperacillin/tazobactam IVPB.. 3.375 Gram(s) IV Intermittent every 12 hours  senna 2 Tablet(s) Oral at bedtime    MEDICATIONS  (PRN):  acetaminophen     Tablet .. 650 milliGRAM(s) Oral every 6 hours PRN Temp greater or equal to 38C (100.4F), Mild Pain (1 - 3)  aluminum hydroxide/magnesium hydroxide/simethicone Suspension 30 milliLiter(s) Oral every 4 hours PRN Dyspepsia  dextrose Oral Gel 15 Gram(s) Oral once PRN Blood Glucose LESS THAN 70 milliGRAM(s)/deciliter  melatonin 3 milliGRAM(s) Oral at bedtime PRN Insomnia  ondansetron Injectable 4 milliGRAM(s) IV Push every 8 hours PRN Nausea and/or Vomiting      Vital Signs Last 24 Hrs  T(C): 36.9 (10 Oct 2023 16:16), Max: 37.7 (10 Oct 2023 09:38)  T(F): 98.4 (10 Oct 2023 16:16), Max: 99.9 (10 Oct 2023 09:38)  HR: 85 (10 Oct 2023 18:00) (81 - 138)  BP: 110/59 (10 Oct 2023 18:00) (66/41 - 110/59)  BP(mean): --  RR: 17 (10 Oct 2023 18:00) (17 - 20)  SpO2: 99% (10 Oct 2023 18:00) (90% - 100%)    Parameters below as of 10 Oct 2023 18:00  Patient On (Oxygen Delivery Method): room air        PHYSICAL EXAM:  HEENT: Nc/AT, PERRLA.   Neck: Soft, no tenderness  Lungs: Coarse BS bilaterally, no wheezing.   Heart: RRR, no murmurs.   Abdomen: Soft, no tenderness.   Genital/ Rectal: no lackey catheter.   Extremities: Small, deep wound with tunneling on left posterior thigh with some purulent drainage. Bilateral BKS stumps intact.   Neurologic: Awake.     I&O's Summary      LABORATORY:                          10.3   19.60 )-----------( 395      ( 10 Oct 2023 11:15 )             33.5           10-10    136  |  96  |  30<H>  ----------------------------<  323<H>  3.5   |  25  |  8.20<H>    Ca    9.3      10 Oct 2023 11:15    TPro  8.7<H>  /  Alb  2.4<L>  /  TBili  0.7  /  DBili  x   /  AST  27  /  ALT  27  /  AlkPhos  155<H>  10-10          LABORATORY:    CBC Full  -  ( 10 Oct 2023 11:15 )  WBC Count : 19.60 K/uL  RBC Count : 3.37 M/uL  Hemoglobin : 10.3 g/dL  Hematocrit : 33.5 %  Platelet Count - Automated : 395 K/uL  Mean Cell Volume : 99.4 fl  Mean Cell Hemoglobin : 30.6 pg  Mean Cell Hemoglobin Concentration : 30.7 gm/dL  Auto Neutrophil # : 16.05 K/uL  Auto Lymphocyte # : 1.55 K/uL  Auto Monocyte # : 1.63 K/uL  Auto Eosinophil # : 0.04 K/uL  Auto Basophil # : 0.10 K/uL  Auto Neutrophil % : 81.9 %  Auto Lymphocyte % : 7.9 %  Auto Monocyte % : 8.3 %  Auto Eosinophil % : 0.2 %  Auto Basophil % : 0.5 %          10-10    136  |  96  |  30<H>  ----------------------------<  323<H>  3.5   |  25  |  8.20<H>    Ca    9.3      10 Oct 2023 11:15    TPro  8.7<H>  /  Alb  2.4<L>  /  TBili  0.7  /  DBili  x   /  AST  27  /  ALT  27  /  AlkPhos  155<H>  10-10      Assessment and Plan:    1. Left posterior infected wound  2. Sepsis, likely due to infected wound.  3. ESRD on HD  4. Bilateral BKA.      Follow wound culture  Surgery evaluation for possible need of debridement of infected wound.  Continue IV Zosyn q12h. Get Vanco trough tomorrow and if below 15, redose with another 1 gm IV Vancomycin  Wound care daily.      Thank you

## 2023-10-10 NOTE — CONSULT NOTE ADULT - SUBJECTIVE AND OBJECTIVE BOX
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 Sin/Dr. Singh) Presents to ED [facility] 10/10 with weakness for 2 weeks. 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 Wound care consult and ID consults are requested .    PAST MEDICAL & SURGICAL HISTORY:  ESRD on dialysis    H/O hypotension    Diabetes mellitus    Leg wound, left    Steal syndrome dialysis vascular access    Gangrene of finger of right hand    PVD (peripheral vascular disease)    Anemia of chronic disease    Constipation    HLD (hyperlipidemia)    S/P BKA (below knee amputation) bilateral    AV fistula          Medication list         MEDICATIONS  (STANDING):  atorvastatin 40 milliGRAM(s) Oral at bedtime  collagenase Ointment 1 Application(s) Topical daily  dextrose 5%. 1000 milliLiter(s) (50 mL/Hr) IV Continuous <Continuous>  dextrose 5%. 1000 milliLiter(s) (100 mL/Hr) IV Continuous <Continuous>  dextrose 50% Injectable 12.5 Gram(s) IV Push once  dextrose 50% Injectable 25 Gram(s) IV Push once  dextrose 50% Injectable 25 Gram(s) IV Push once  dorzolamide 2%/timolol 0.5% Ophthalmic Solution 1 Drop(s) Both EYES two times a day  glucagon  Injectable 1 milliGRAM(s) IntraMuscular once  insulin lispro (ADMELOG) corrective regimen sliding scale   SubCutaneous every 6 hours  lactobacillus acidophilus 1 Tablet(s) Oral every 12 hours  midodrine. 10 milliGRAM(s) Oral three times a day  pantoprazole    Tablet 40 milliGRAM(s) Oral before breakfast  piperacillin/tazobactam IVPB.. 3.375 Gram(s) IV Intermittent every 12 hours  senna 2 Tablet(s) Oral at bedtime    MEDICATIONS  (PRN):  acetaminophen     Tablet .. 650 milliGRAM(s) Oral every 6 hours PRN Temp greater or equal to 38C (100.4F), Mild Pain (1 - 3)  aluminum hydroxide/magnesium hydroxide/simethicone Suspension 30 milliLiter(s) Oral every 4 hours PRN Dyspepsia  dextrose Oral Gel 15 Gram(s) Oral once PRN Blood Glucose LESS THAN 70 milliGRAM(s)/deciliter  melatonin 3 milliGRAM(s) Oral at bedtime PRN Insomnia  ondansetron Injectable 4 milliGRAM(s) IV Push every 8 hours PRN Nausea and/or Vomiting         Vitals log        ICU Vital Signs Last 24 Hrs  T(C): 36.9 (10 Oct 2023 16:16), Max: 37.7 (10 Oct 2023 09:38)  T(F): 98.4 (10 Oct 2023 16:16), Max: 99.9 (10 Oct 2023 09:38)  HR: 89 (10 Oct 2023 16:45) (81 - 138)  BP: 88/50 (10 Oct 2023 16:45) (66/41 - 100/52)  BP(mean): --  ABP: --  ABP(mean): --  RR: 18 (10 Oct 2023 16:45) (17 - 20)  SpO2: 99% (10 Oct 2023 16:45) (90% - 100%)    O2 Parameters below as of 10 Oct 2023 16:45  Patient On (Oxygen Delivery Method): room air                 Input and Output:  I&O's Detail      Lab Data                        10.3   19.60 )-----------( 395      ( 10 Oct 2023 11:15 )             33.5     10-10    136  |  96  |  30<H>  ----------------------------<  323<H>  3.5   |  25  |  8.20<H>    Ca    9.3      10 Oct 2023 11:15    TPro  8.7<H>  /  Alb  2.4<L>  /  TBili  0.7  /  DBili  x   /  AST  27  /  ALT  27  /  AlkPhos  155<H>  10-10    PAST SURGICAL HISTORY:  AV fistula     S/P BKA (below knee amputation) bilateral.     Social History:  · Substance use	No     Tobacco Screening:  · Core Measure Site	Yes  · Has the patient used tobacco in the past 30 days?	No    Risk Assessment:    Present on Admission:  Deep Venous Thrombosis	no  Pulmonary Embolus	no     HIV Screening:  · In accordance with [place] State law, we offer every patient who comes to our ED an HIV test. Would you like to be tested today?	Previously Declined (within the last year)          Review of Systems	  weakness      Objective     Physical Examination    heart s1s2  lung dc BS  head nc  verbal  alert  cn grossly int      Pertinent Lab findings & Imaging      [place]:  NO   Adequate UO     I&O's Detail           Discussed with:     Cultures:	        Radiology      ACC: [id number] EXAM:  XR CHEST PORTABLE IMMED 1V   ORDERED BY: SHYANNE STEINER     PROCEDURE DATE:  10/10/2023          INTERPRETATION:  AP chest on October 10, 2023 at 10:21 AM. Patient is   short of breath with cough and fever.    Heart magnified by technique.    Lungs remain clear. Above findings are similar to March 6 this year.    Present film includes some of the right arm and shows clips in the right   arm.    IMPRESSION: No acute chest finding. Clips in the right arm.    --- End of Report ---            VIRY VIVAS MD; Attending Radiologist  This document has been electronically signed. Oct 10 2023  1:27PM

## 2023-10-10 NOTE — ED ADULT NURSE REASSESSMENT NOTE - NS ED NURSE REASSESS COMMENT FT1
Spoke to Dr. Pickett and filled him in on the patient's latest sets of vital signs.   Dr. Pickett states he will order additional fluids for the patient.  Mentioned to Dr. Pickett that Dr. Shah didn't want the patient to receive additional IV fluids.  He states he will order 250 ml bolus.

## 2023-10-10 NOTE — CONSULT NOTE ADULT - ASSESSMENT
Initial evaluation/Pulmonary Critical Care consultation requested by DR DENNY  on 10/10/2023   from Dr Neo Pickett    Patient examined chart reviewed    HOSPITAL ADMISSION   PATIENT CAME  FROM (if information available)      GENERAL DATA .     GOC.  .. 10/10/2023 full code  ICU STAY. .. none  COVID. ..  scv2 10/10/2023 (-)  BEST PRACTICE ISSUES.    HOB ELEVATN. .. Yes  DVT PPLX. ..  10/10/2023 [facility]    SPEECH SWALLOW RECOMMENDATIONS.    ..        DIET.  ..  10/10/2023 npo   IV fl ...   STRESS ULCER PPLX. ..    10/10/2023 protonix 40  INFECTION PPLX. ..     ALLGY. ..   turkey                      WT. ..  10/10/2023 59  BMI. ..        ABGS.   .     VS/ PO/IO/ VENT/ DRIPS.   10/10/2023 afeb 84 88/50   10/10/2023 ra 99%     DOA C/C.   10/10/2023 Increasing weakness 2 weeks fevermalaise     INITIAL PRESENTATION.   . 10/10/2023 47yo male with weakness for 2 weeks. pt is dialysis patient M/W/F last dialysis was yesterday but pt has been feeling weak for the last 2 weeks, no cough, fever, chills, no vomiting or diarrhea, pt also has had an open wound under his left thigh that has been neglected for the last few weeks, draining pus and with redness  . Pulm critical care consulted as BP low     PMH.   . DM  . ESRD  . HD   . AV fistula  . BL BKA     HOME MEDS.   COURSE.     PROBLEMS/ASSESSMENT/RECOMMENDATIONS (A/R).  INFECTION.  . Skin soft tissue infection   .. W 10/10/2023 w 19   .. Flu ab 10/10/2023 (-)  .. rsv 10/10/2023 (-)  .. 10/10/2023 zosyn     CARDIAC.  .. La 10/10/2023 la 1.1   .. Monitor     .. Shock  .. 10/10/2023 5:30 PM Has been given 2l fluid challenge  .. 10/10/2023 5:30 PM ra po 99%  .. 10/10/2023 will cautiously try more fluids  .. 10/10/2023 If continues to have map below 65 will need icu for iv vasopressors     HEMAT.  .. Hb 10/10/2023 Hb 10.3   .. Inr 10/10/2023 inr 129   .. Monitor     RENAL.  . ESRD  .. Na 10/10/2023 Na 136  .. Cr 10/10/2023 Cr 8.2   .. HD as guided by renal       MAIN ISSUES   . SHOCK   . SEPSIS   . SKIN SOFT TISSUE CELLULITIS   . ESRD     TIME SPENT.   . Over 36 minutes aggregate care time spent on encounter; activities included   direct patient care, counseling and/or coordinating care reviewing notes, lab data/ imaging , discussion with multidisciplinary team/ patient  /family and explaining in detail risks, benefits, alternatives  of the recommendations     MICHAEL TIRADO 48 m 10/10/2023 [address] DR ELENA SCHMUTER DR MOHSEN PAHLAVAN

## 2023-10-10 NOTE — ED ADULT TRIAGE NOTE - CHIEF COMPLAINT QUOTE
Presents with increasing weakness at home x 2 weeks.  Increasing temp  gen malaise.  Dialysis pt.  M-W-F.  Went yesterday

## 2023-10-10 NOTE — ED ADULT NURSE REASSESSMENT NOTE - NS ED NURSE REASSESS COMMENT FT1
Dr. Pickett called to check on patient's condition.  Informed Dr. Pickett that I had just gotten one SBP at 100, and that I would like to be sure it stays at an acceptable level before we decide that Telemetry will be adequate care.  He agreed that we should check the pressure a few more times, and if it drops again, to call him at [phone], and he stated he would order more fluids.  Told Dr. Pickett that I had heard that Dr. Shah had said that he did not want the patient to receive more IV fluids after the 1500 that he has received.  Will continue to monitor.

## 2023-10-10 NOTE — CONSULT NOTE ADULT - SUBJECTIVE AND OBJECTIVE BOX
[facility] [place]     Patient is a 48y old  Male who presents with a chief complaint of weakness (10 Oct 2023 20:36)      HPI:  47 yo malewith PMH of DM2, b/l BKA, ESRD (on HD MWF) ,infection  right third finger 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 Sin/Dr. Singh) Presents to ED [facility] 10/10 with weakness for 2 weeks. 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 Wound care consult and ID consults are requested . (10 Oct 2023 12:52)      HPI:    48y Male for Cardiology Consult    PAST MEDICAL & SURGICAL HISTORY:  ESRD on dialysis      H/O hypotension      Diabetes mellitus      Leg wound, left      Steal syndrome dialysis vascular access      Gangrene of finger of right hand      PVD (peripheral vascular disease)      Anemia of chronic disease      Constipation      HLD (hyperlipidemia)      S/P BKA (below knee amputation) bilateral      AV fistula          FAMILY HISTORY:      SOCIAL HISTORY:   Alcohol:  Smoking:    Allergies    No Known Drug Allergies  Tur[place] (Rash)    Intolerances        MEDICATIONS  (STANDING):  atorvastatin 40 milliGRAM(s) Oral at bedtime  collagenase Ointment 1 Application(s) Topical daily  dextrose 5%. 1000 milliLiter(s) (50 mL/Hr) IV Continuous <Continuous>  dextrose 5%. 1000 milliLiter(s) (100 mL/Hr) IV Continuous <Continuous>  dextrose 50% Injectable 25 Gram(s) IV Push once  dextrose 50% Injectable 12.5 Gram(s) IV Push once  dextrose 50% Injectable 25 Gram(s) IV Push once  dorzolamide 2%/timolol 0.5% Ophthalmic Solution 1 Drop(s) Both EYES two times a day  glucagon  Injectable 1 milliGRAM(s) IntraMuscular once  heparin   Injectable 5000 Unit(s) SubCutaneous every 12 hours  insulin lispro (ADMELOG) corrective regimen sliding scale   SubCutaneous every 6 hours  lactobacillus acidophilus 1 Tablet(s) Oral every 12 hours  midodrine. 10 milliGRAM(s) Oral three times a day  pantoprazole    Tablet 40 milliGRAM(s) Oral before breakfast  piperacillin/tazobactam IVPB.. 3.375 Gram(s) IV Intermittent every 12 hours  senna 2 Tablet(s) Oral at bedtime    MEDICATIONS  (PRN):  acetaminophen     Tablet .. 650 milliGRAM(s) Oral every 6 hours PRN Temp greater or equal to 38C (100.4F), Mild Pain (1 - 3)  aluminum hydroxide/magnesium hydroxide/simethicone Suspension 30 milliLiter(s) Oral every 4 hours PRN Dyspepsia  dextrose Oral Gel 15 Gram(s) Oral once PRN Blood Glucose LESS THAN 70 milliGRAM(s)/deciliter  melatonin 3 milliGRAM(s) Oral at bedtime PRN Insomnia  ondansetron Injectable 4 milliGRAM(s) IV Push every 8 hours PRN Nausea and/or Vomiting      REVIEW OF SYSTEMS:  CONSTITUTIONAL: No fever, weight loss, chills, shakes, or fat  RESPIRATORY: No cough, wheezing, hemoptysis, or shortness of breath  CARDIOVASCULAR: No chest pain, dyspnea, palpitations, dizziness, syncope, paroxysmal nocturnal dyspnea, orthopnea, or arm or leg swelling  GASTROINTESTINAL: No abdominal  or epigastric pain, nausea, vomiting, hematemesis, diarrhea, constipation, melena or bright red bloo  NEUROLOGICAL: No headaches, memory loss, slurred speech, limb weakness, loss of strength, numbness, or tremors  SKIN: No itching, burning, rashes, or lesions  ENDOCRINE: No heat or cold intolerance, or hair loss  MUSCULOSKELETAL: No joint pain or swelling, muscle, back, or extremity pain  HEME/LYMPH: No easy bruising or bleeding gums  ALLERY AND IMMUNOLOGIC: No hives or rash.    Vital Signs Last 24 Hrs  T(C): 38.3 (10 Oct 2023 22:18), Max: 38.3 (10 Oct 2023 22:18)  T(F): 100.9 (10 Oct 2023 22:18), Max: 100.9 (10 Oct 2023 22:18)  HR: 79 (10 Oct 2023 22:18) (75 - 138)  BP: 90/40 (10 Oct 2023 22:18) (66/41 - 110/59)  BP(mean): --  RR: 19 (10 Oct 2023 22:18) (17 - 20)  SpO2: 99% (10 Oct 2023 22:18) (90% - 100%)    Parameters below as of 10 Oct 2023 22:18  Patient On (Oxygen Delivery Method): room air        PHYSICAL EXAM:  HEAD:  Atraumatic, Normocephalic  EYES: EOMI, PERRLA, conjunctiva and sclera clear  NECK: Supple and normal thyroid.  No JVD or carotid bruit.   HEART: S1, S2 regular , 1/6 soft ejection systolic murmur in mitral area , no thrill and no gallops .  PULMONARY: Bilateral vesicular breathing , few scattered ronchi and few scattered rales are present .  ABDOMEN: Soft nontender and positive bowl sounds   EXTREMITIES:  No clubbing, cyanosis, or pedal  edema  NEUROLOGICAL: AAOX3 , no focal deficit .    LABS:                        10.3   19.60 )-----------( 395      ( 10 Oct 2023 11:15 )             33.5     10-10    136  |  96  |  30<H>  ----------------------------<  323<H>  3.5   |  25  |  8.20<H>    Ca    9.3      10 Oct 2023 11:15    TPro  8.7<H>  /  Alb  2.4<L>  /  TBili  0.7  /  DBili  x   /  AST  27  /  ALT  27  /  AlkPhos  155<H>  10-10        PT/INR - ( 10 Oct 2023 11:15 )   PT: 15.0 sec;   INR: 1.29 ratio         PTT - ( 10 Oct 2023 11:15 )  PTT:29.2 sec  Urinalysis Basic - ( 10 Oct 2023 11:15 )    Color: x / Appearance: x / SG: x / pH: x  Gluc: 323 mg/dL / Ketone: x  / Bili: x / Urobili: x   Blood: x / Protein: x / Nitrite: x   Leuk Esterase: x / RBC: x / WBC x   Sq Epi: x / Non Sq Epi: x / Bacteria: x      BNP      EKG:  ECHO:  IMAGING:    Assessment and Plan :     Will continue to follow during hospital course and give further recommendations as needed . Thanks for your referral . if any questions please contact me at office [phone] [phone])

## 2023-10-10 NOTE — CONSULT NOTE ADULT - ASSESSMENT
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 Sin/Dr. Singh) Presents to ED [facility] 10/10 with weakness for 2 weeks. 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 Wound care consult and ID consults are requested . (10 Oct 2023 12:52)      esrd on hd   Excess fluids and waste products will be removed from your blood; your electrolytes will be balanced; your blood pressure will be controlled.    Admit for septic workup and ID evaluation,send blood and urine cx,serial lactate levels,monitor vitals closley,ivfs hydration,monitor urine output and renal profile,iv abx as per id cons         HPI:  49 yo malewith PMH of DM2, b/l BKA, ESRD (on HD MWF) ,infection  right third finger 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 Sin/Dr. Singh) Presents to ED [facility] 10/10 with weakness for 2 weeks. 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 Wound care consult and ID consults are requested . (10 Oct 2023 12:52)      esrd on hd   Excess fluids and waste products will be removed from your blood; your electrolytes will be balanced; your blood pressure will be controlled.    Admit for septic workup and ID evaluation,send blood and urine cx,serial lactate levels,monitor vitals closley,ivfs hydration,monitor urine output and renal profile,iv abx as per id cons  vancomycin  IVPB 500 milliGRAM(s) IV Intermittent once    BP monitoring,continue current antihypertensive meds, low salt diet,followup with PMD in 1-2 weeks      ANEMIA PLAN:  Anemia of chronic disease:  Well controlled by Aranesp  H and H subtherapeutic .  We will continue Aranesp aiming for a HCT of 32-36 %.   We will monitor Iron stores, B12 and RBC folate .

## 2023-10-10 NOTE — H&P ADULT - HISTORY OF PRESENT ILLNESS
47yo male with weakness for 2 weeks. pt is dialysis patient M/W/F last dialysis was yesterday but pt has been feeling weak for the last 2 weeks, no cough, fever, chills, no vomiting or diarrhea, pt also has had an open wound under his left thigh that has been neglected for the last few weeks, draining pus and with redness 49 yo malewith PMH of DM2, b/l BKA, ESRD (on HD MWF) ,infection  right third finger 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 Sin/Dr. Singh) Presents to ED [facility] 10/10 with weakness for 2 weeks. 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 Wound care consult and ID consults are requested .

## 2023-10-10 NOTE — ED ADULT NURSE REASSESSMENT NOTE - NS ED NURSE REASSESS COMMENT FT1
Nii GRAHAM attempted to place Ultrasound guided IV to left arm, he was not successful.  He then moved on to attempt IV placement to right neck.

## 2023-10-10 NOTE — ED PROVIDER NOTE - DIFFERENTIAL DIAGNOSIS
Differential Diagnosis ddx considered but not limited to sepsis, dehydration, infected pressure ulcer

## 2023-10-10 NOTE — ED ADULT NURSE NOTE - NSFALLHARMRISKINTERV_ED_ALL_ED

## 2023-10-10 NOTE — H&P ADULT - PROBLEM SELECTOR PLAN 7
Admit for iv hydration,monitor renal profile and urine output,nutritionist consult,prealbumin level,serial bmp,nutritional supplements,multivitamins,palliative care evaluation regarding MOLST completion and artificial nutrition discussion

## 2023-10-10 NOTE — CONSULT NOTE ADULT - SUBJECTIVE AND OBJECTIVE BOX
Patient is a 48y Male whom presented to the hospital with     PAST MEDICAL & SURGICAL HISTORY:  ESRD on dialysis      H/O hypotension      Diabetes mellitus      Leg wound, left      Steal syndrome dialysis vascular access      Gangrene of finger of right hand      PVD (peripheral vascular disease)      Anemia of chronic disease      Constipation      HLD (hyperlipidemia)      S/P BKA (below knee amputation) bilateral      AV fistula          MEDICATIONS  (STANDING):  atorvastatin 40 milliGRAM(s) Oral at bedtime  collagenase Ointment 1 Application(s) Topical daily  dextrose 5%. 1000 milliLiter(s) (50 mL/Hr) IV Continuous <Continuous>  dextrose 5%. 1000 milliLiter(s) (100 mL/Hr) IV Continuous <Continuous>  dextrose 50% Injectable 25 Gram(s) IV Push once  dextrose 50% Injectable 25 Gram(s) IV Push once  dextrose 50% Injectable 12.5 Gram(s) IV Push once  dorzolamide 2%/timolol 0.5% Ophthalmic Solution 1 Drop(s) Both EYES two times a day  glucagon  Injectable 1 milliGRAM(s) IntraMuscular once  heparin   Injectable 5000 Unit(s) SubCutaneous every 12 hours  insulin lispro (ADMELOG) corrective regimen sliding scale   SubCutaneous every 6 hours  lactobacillus acidophilus 1 Tablet(s) Oral every 12 hours  midodrine. 10 milliGRAM(s) Oral three times a day  pantoprazole    Tablet 40 milliGRAM(s) Oral before breakfast  piperacillin/tazobactam IVPB.. 3.375 Gram(s) IV Intermittent every 12 hours  senna 2 Tablet(s) Oral at bedtime      Allergies    No Known Drug Allergies  [place] (Rash)    Intolerances        SOCIAL HISTORY:  Denies ETOh,Smoking,     FAMILY HISTORY:      REVIEW OF SYSTEMS:    CONSTITUTIONAL: No weakness, fevers or chills  EYES/ENT: No visual changes;  no throat pain   NECK: No pain or stiffness  RESPIRATORY: No cough, wheezing, hemoptysis; No shortness of breath  CARDIOVASCULAR: No chest pain or palpitations  GASTROINTESTINAL: No abdominal or epigastric pain. No nausea, vomiting,     No diarrhea or constipation. No melena   GENITOURINARY: No dysuria, frequency or hematuria  NEUROLOGICAL: No numbness or weakness  SKIN: dry      VITAL:  T(C): , Max: 37.7 (10-10-23 @ 09:38)  T(F): , Max: 99.9 (10-10-23 @ 09:38)  HR: 75 (10-10-23 @ 19:50)  BP: 105/56 (10-10-23 @ 19:50)  BP(mean): --  RR: 18 (10-10-23 @ 19:50)  SpO2: 99% (10-10-23 @ 19:50)  Wt(kg): --    I and O's:      Weight (kg): 59 (10-10 @ 09:17)    PHYSICAL EXAM:    Constitutional: NAD  HEENT: conjunctive   clear   Neck:  No JVD  Respiratory: CTAB  Cardiovascular: S1 and S2  Gastrointestinal: BS+, soft, NT/ND  Extremities: No peripheral edema  Neurological: A/O x 3, no focal deficits  Psychiatric: Normal mood, normal affect  : No Cancino  Skin: No rashes  Access: Not applicable    LABS:                        10.3   19.60 )-----------( 395      ( 10 Oct 2023 11:15 )             33.5     10-10    136  |  96  |  30<H>  ----------------------------<  323<H>  3.5   |  25  |  8.20<H>    Ca    9.3      10 Oct 2023 11:15    TPro  8.7<H>  /  Alb  2.4<L>  /  TBili  0.7  /  DBili  x   /  AST  27  /  ALT  27  /  AlkPhos  155<H>  10-10      Urine Studies:  Urinalysis Basic - ( 10 Oct 2023 11:15 )    Color: x / Appearance: x / SG: x / pH: x  Gluc: 323 mg/dL / Ketone: x  / Bili: x / Urobili: x   Blood: x / Protein: x / Nitrite: x   Leuk Esterase: x / RBC: x / WBC x   Sq Epi: x / Non Sq Epi: x / Bacteria: x            RADIOLOGY & ADDITIONAL STUDIES:                   Patient is a 48y Male whom presented to the hospital with esrd on hd     PAST MEDICAL & SURGICAL HISTORY:  ESRD on dialysis      H/O hypotension      Diabetes mellitus      Leg wound, left      Steal syndrome dialysis vascular access      Gangrene of finger of right hand      PVD (peripheral vascular disease)      Anemia of chronic disease      Constipation      HLD (hyperlipidemia)      S/P BKA (below knee amputation) bilateral      AV fistula          MEDICATIONS  (STANDING):  atorvastatin 40 milliGRAM(s) Oral at bedtime  collagenase Ointment 1 Application(s) Topical daily  dextrose 5%. 1000 milliLiter(s) (50 mL/Hr) IV Continuous <Continuous>  dextrose 5%. 1000 milliLiter(s) (100 mL/Hr) IV Continuous <Continuous>  dextrose 50% Injectable 25 Gram(s) IV Push once  dextrose 50% Injectable 25 Gram(s) IV Push once  dextrose 50% Injectable 12.5 Gram(s) IV Push once  dorzolamide 2%/timolol 0.5% Ophthalmic Solution 1 Drop(s) Both EYES two times a day  glucagon  Injectable 1 milliGRAM(s) IntraMuscular once  heparin   Injectable 5000 Unit(s) SubCutaneous every 12 hours  insulin lispro (ADMELOG) corrective regimen sliding scale   SubCutaneous every 6 hours  lactobacillus acidophilus 1 Tablet(s) Oral every 12 hours  midodrine. 10 milliGRAM(s) Oral three times a day  pantoprazole    Tablet 40 milliGRAM(s) Oral before breakfast  piperacillin/tazobactam IVPB.. 3.375 Gram(s) IV Intermittent every 12 hours  senna 2 Tablet(s) Oral at bedtime      Allergies    No Known Drug Allergies  [place] (Rash)    Intolerances        SOCIAL HISTORY:  Denies ETOh,Smoking,     FAMILY HISTORY:      REVIEW OF SYSTEMS:    CONSTITUTIONAL: No weakness, fevers or chills  EYES/ENT: No visual changes;  no throat pain   NECK: No pain or stiffness  RESPIRATORY: No cough, wheezing, hemoptysis; No shortness of breath  CARDIOVASCULAR: No chest pain or palpitations  GASTROINTESTINAL: No abdominal or epigastric pain. No nausea, vomiting,     No diarrhea or constipation. No melena   GENITOURINARY: No dysuria, frequency or hematuria  NEUROLOGICAL: No numbness or weakness  SKIN: dry      VITAL:  T(C): , Max: 37.7 (10-10-23 @ 09:38)  T(F): , Max: 99.9 (10-10-23 @ 09:38)  HR: 75 (10-10-23 @ 19:50)  BP: 105/56 (10-10-23 @ 19:50)  BP(mean): --  RR: 18 (10-10-23 @ 19:50)  SpO2: 99% (10-10-23 @ 19:50)  Wt(kg): --    I and O's:      Weight (kg): 59 (10-10 @ 09:17)    PHYSICAL EXAM:    Constitutional: NAD  HEENT: conjunctive   clear   Neck:  No JVD  Respiratory: CTAB  Cardiovascular: S1 and S2  Gastrointestinal: BS+, soft, NT/ND  Extremities: No peripheral edema  Neurological: A/O x 3, no focal deficits  Psychiatric: Normal mood, normal affect  : No Cancino  Skin: No rashes   S/P BKA (below knee amputation) bilateral  AV fistula  LABS:                        10.3   19.60 )-----------( 395      ( 10 Oct 2023 11:15 )             33.5     10-10    136  |  96  |  30<H>  ----------------------------<  323<H>  3.5   |  25  |  8.20<H>    Ca    9.3      10 Oct 2023 11:15    TPro  8.7<H>  /  Alb  2.4<L>  /  TBili  0.7  /  DBili  x   /  AST  27  /  ALT  27  /  AlkPhos  155<H>  10-10      Urine Studies:  Urinalysis Basic - ( 10 Oct 2023 11:15 )    Color: x / Appearance: x / SG: x / pH: x  Gluc: 323 mg/dL / Ketone: x  / Bili: x / Urobili: x   Blood: x / Protein: x / Nitrite: x   Leuk Esterase: x / RBC: x / WBC x   Sq Epi: x / Non Sq Epi: x / Bacteria: x            RADIOLOGY & ADDITIONAL STUDIES:        Home Medications:  Admelog 100 units/mL injectable solution: 4 unit(s) subcutaneously 3 times a day (10 Oct 2023 14:32)  amLODIPine 5 mg oral tablet: 1 tab(s) orally once a day (10 Oct 2023 14:32)  atorvastatin 40 mg oral tablet: 1 tab(s) orally once a day (10 Oct 2023 14:32)  insulin glargine 100 units/mL subcutaneous solution: 4 unit(s) subcutaneous once a day (at bedtime) (10 Oct 2023 14:32)  senna (sennosides) 8.6 mg oral tablet: 2 tab(s) orally once a day (at bedtime) (10 Oct 2023 14:32)  Velphoro 500 mg oral tablet, chewable: 3 tab(s) chewed 3 times a day (10 Oct 2023 14:32)

## 2023-10-10 NOTE — CHART NOTE - NSCHARTNOTEFT_GEN_A_CORE
Called by RN as patient with rectal temp 100.9F. The patient VS notable for 90/40 but asymptomatic at this time. Patient being worked up for sepsis. Tylenol PRN fever. Cont zosyn. RN to call with any changes. Called by RN as patient with rectal temp 100.9F. The patient VS notable for 90/40 but asymptomatic at this time. Patient being worked up for sepsis. Tylenol PRN fever. Cont zosyn. RN to call with any changes.    Addendum: Called by RN as patient with SBP 74. Patient seen at bedside. Patient on HD M/W/F and admitted with sepsis. Will give ofirmev for current fever. On iv abx. For dialysis today, will trial NS 500cc bolus along with patient's ordered midodrine 10mg po qd now. Called by RN as patient with rectal temp 100.9F. The patient VS notable for 90/40 but asymptomatic at this time. Patient being worked up for sepsis. Tylenol PRN fever. Cont zosyn. RN to call with any changes.    Addendum: Called by RN as patient with SBP 74. Patient seen at bedside. Patient on HD M/W/F and admitted with sepsis. Will give ofirmev for current fever. On iv abx. For dialysis today, will give NS 500cc bolus along with patient's ordered midodrine 10mg po qd now. Called by RN as patient with rectal temp 100.9F. The patient VS notable for 90/40 but asymptomatic at this time. Patient being worked up for sepsis. Tylenol PRN fever. Cont zosyn. RN to call with any changes.    Addendum: Called by RN as patient with SBP 74. Patient seen at bedside. Patient on HD M/W/F and admitted with sepsis. Will give ofirmev for current fever. On iv abx. For dialysis today, will give NS 500cc bolus along with patient's ordered midodrine 10mg po qd now. I consulted ICU given low blood pressure, and for possibility of vasopressor support. Called by RN as patient with rectal temp 100.9F. The patient VS notable for 90/40 but asymptomatic at this time. Patient being worked up for sepsis. Tylenol PRN fever. Cont zosyn. RN to call with any changes.    Addendum 05:00: Called by RN as patient with SBP 74. Patient seen at bedside. Patient on HD M/W/F and admitted with sepsis. Will give ofirmev for current fever. On iv abx. For dialysis today, will give NS 500cc bolus along with patient's ordered midodrine 10mg po qd now. I consulted ICU given low blood pressure, and for possibility of vasopressor support. Called by RN as patient with rectal temp 100.9F. The patient VS notable for 90/40 but asymptomatic at this time. Patient being worked up for sepsis. Tylenol PRN fever. Cont zosyn. RN to call with any changes.    Addendum 05:00: Called by RN as patient with SBP 74. Patient seen at bedside. Patient on HD M/W/F and admitted with sepsis. Will give ofirmev for current fever. On iv abx. For dialysis today, will give NS 500cc bolus along with patient's ordered midodrine 10mg po qd now. RN to call with any concerns.

## 2023-10-10 NOTE — H&P ADULT - PROBLEM SELECTOR PLAN 4
midodrine 10 mg po tid added as per nephrologist recommendation , received 1500 ml of iv fluids in er

## 2023-10-10 NOTE — PHARMACOTHERAPY INTERVENTION NOTE - COMMENTS
47 yo male PMH of DM2, b/l BKA, ESRD (on HD MWF) ,infection being treated for left posterior wound infection/sepsis with zosyn 3.375g q12h and vancomycin dosed by level. Recommended MRSA PCR to r/o need for MRSA coverage with vancomycin. Order entered per discussion with Dr. Olivas.  47 yo male PMH of DM2, b/l BKA, ESRD (on HD MWF) ,infection being treated for left posterior wound infection/sepsis with zosyn 3.375g q12h and vancomycin dosed by level s/p vancomycin 1g x 1 in the ED. Recommended MRSA PCR to r/o need for MRSA coverage with vancomycin. Order entered per discussion with Dr. Olivas.     Additionally, recommended a vancomycin trough level pre-dialysis for Wednesday, 10/11 AM to streamline further dosing. Order entered per discussion with Dr. Olivas.

## 2023-10-10 NOTE — CONSULT NOTE ADULT - SUBJECTIVE AND OBJECTIVE BOX
CHIEF COMPLAINT/ REASON FOR VISIT  .. Patient was seen to address the  issue listed under PROBLEM LIST which is located toward bottom of this note     MELVI RODRIGUEZ    [facility]    Allergies    No Known Drug Allergies  [place] (Rash)    Intolerances        PAST MEDICAL & SURGICAL HISTORY:  ESRD on dialysis      H/O hypotension      Diabetes mellitus      Leg wound, left      Steal syndrome dialysis vascular access      Gangrene of finger of right hand      PVD (peripheral vascular disease)      Anemia of chronic disease      Constipation      HLD (hyperlipidemia)      S/P BKA (below knee amputation) bilateral      AV fistula          FAMILY HISTORY:      Home Medications:  Admelog 100 units/mL injectable solution: 4 unit(s) subcutaneously 3 times a day (10 Oct 2023 14:32)  amLODIPine 5 mg oral tablet: 1 tab(s) orally once a day (10 Oct 2023 14:32)  atorvastatin 40 mg oral tablet: 1 tab(s) orally once a day (10 Oct 2023 14:32)  insulin glargine 100 units/mL subcutaneous solution: 4 unit(s) subcutaneous once a day (at bedtime) (10 Oct 2023 14:32)  senna (sennosides) 8.6 mg oral tablet: 2 tab(s) orally once a day (at bedtime) (10 Oct 2023 14:32)  Velphoro 500 mg oral tablet, chewable: 3 tab(s) chewed 3 times a day (10 Oct 2023 14:32)      MEDICATIONS  (STANDING):  atorvastatin 40 milliGRAM(s) Oral at bedtime  collagenase Ointment 1 Application(s) Topical daily  dextrose 5%. 1000 milliLiter(s) (50 mL/Hr) IV Continuous <Continuous>  dextrose 5%. 1000 milliLiter(s) (100 mL/Hr) IV Continuous <Continuous>  dextrose 50% Injectable 25 Gram(s) IV Push once  dextrose 50% Injectable 25 Gram(s) IV Push once  dextrose 50% Injectable 12.5 Gram(s) IV Push once  dorzolamide 2%/timolol 0.5% Ophthalmic Solution 1 Drop(s) Both EYES two times a day  glucagon  Injectable 1 milliGRAM(s) IntraMuscular once  insulin lispro (ADMELOG) corrective regimen sliding scale   SubCutaneous every 6 hours  lactobacillus acidophilus 1 Tablet(s) Oral every 12 hours  midodrine. 10 milliGRAM(s) Oral three times a day  pantoprazole    Tablet 40 milliGRAM(s) Oral before breakfast  piperacillin/tazobactam IVPB.. 3.375 Gram(s) IV Intermittent every 12 hours  senna 2 Tablet(s) Oral at bedtime    MEDICATIONS  (PRN):  acetaminophen     Tablet .. 650 milliGRAM(s) Oral every 6 hours PRN Temp greater or equal to 38C (100.4F), Mild Pain (1 - 3)  aluminum hydroxide/magnesium hydroxide/simethicone Suspension 30 milliLiter(s) Oral every 4 hours PRN Dyspepsia  dextrose Oral Gel 15 Gram(s) Oral once PRN Blood Glucose LESS THAN 70 milliGRAM(s)/deciliter  melatonin 3 milliGRAM(s) Oral at bedtime PRN Insomnia  ondansetron Injectable 4 milliGRAM(s) IV Push every 8 hours PRN Nausea and/or Vomiting      Diet, NPO:   Except Medications (10-10-23 @ 13:06) [Active]          Vital Signs Last 24 Hrs  T(C): 36.9 (10 Oct 2023 16:16), Max: 37.7 (10 Oct 2023 09:38)  T(F): 98.4 (10 Oct 2023 16:16), Max: 99.9 (10 Oct 2023 09:38)  HR: 89 (10 Oct 2023 16:45) (81 - 138)  BP: 88/50 (10 Oct 2023 16:45) (66/41 - 100/52)  BP(mean): --  RR: 18 (10 Oct 2023 16:45) (17 - 20)  SpO2: 99% (10 Oct 2023 16:45) (90% - 100%)    Parameters below as of 10 Oct 2023 16:45  Patient On (Oxygen Delivery Method): room air                  LABS:                        10.3   19.60 )-----------( 395      ( 10 Oct 2023 11:15 )             33.5     10-10    136  |  96  |  30<H>  ----------------------------<  323<H>  3.5   |  25  |  8.20<H>    Ca    9.3      10 Oct 2023 11:15    TPro  8.7<H>  /  Alb  2.4<L>  /  TBili  0.7  /  DBili  x   /  AST  27  /  ALT  27  /  AlkPhos  155<H>  10-10    PT/INR - ( 10 Oct 2023 11:15 )   PT: 15.0 sec;   INR: 1.29 ratio         PTT - ( 10 Oct 2023 11:15 )  PTT:29.2 sec  Urinalysis Basic - ( 10 Oct 2023 11:15 )    Color: x / Appearance: x / SG: x / pH: x  Gluc: 323 mg/dL / Ketone: x  / Bili: x / Urobili: x   Blood: x / Protein: x / Nitrite: x   Leuk Esterase: x / RBC: x / WBC x   Sq Epi: x / Non Sq Epi: x / Bacteria: x            WBC:  WBC Count: 19.60 K/uL (10-10 @ 11:15)      MICROBIOLOGY:  RECENT CULTURES:              PT/INR - ( 10 Oct 2023 11:15 )   PT: 15.0 sec;   INR: 1.29 ratio         PTT - ( 10 Oct 2023 11:15 )  PTT:29.2 sec    Sodium:  Sodium: 136 mmol/L (10-10 @ 11:15)      8.20 mg/dL 10-10 @ 11:15      Hemoglobin:  Hemoglobin: 10.3 g/dL (10-10 @ 11:15)      Platelets: Platelet Count - Automated: 395 K/uL (10-10 @ 11:15)      LIVER FUNCTIONS - ( 10 Oct 2023 11:15 )  Alb: 2.4 g/dL / Pro: 8.7 g/dL / ALK PHOS: 155 U/L / ALT: 27 U/L / AST: 27 U/L / GGT: x             Urinalysis Basic - ( 10 Oct 2023 11:15 )    Color: x / Appearance: x / SG: x / pH: x  Gluc: 323 mg/dL / Ketone: x  / Bili: x / Urobili: x   Blood: x / Protein: x / Nitrite: x   Leuk Esterase: x / RBC: x / WBC x   Sq Epi: x / Non Sq Epi: x / Bacteria: x        RADIOLOGY & ADDITIONAL STUDIES:      MICROBIOLOGY:  RECENT CULTURES:

## 2023-10-10 NOTE — H&P ADULT - NSHPLABSRESULTS_GEN_ALL_CORE
< from: Xray Chest 1 View-PORTABLE IMMEDIATE (10.10.23 @ 10:29) >      INTERPRETATION:  AP chest on October 10, 2023 at 10:21 AM. Patient is   short of breath with cough and fever.    Heart magnified by technique.    Lungs remain clear. Above findings are similar to March 6 this year.    Present film includes some of the right arm and shows clips in the right   arm.    IMPRESSION: No acute chest finding. Clips in the right arm.    < end of copied text >    < from: Transthoracic Echocardiogram (01.13.23 @ 08:42) >    ------------------------------------------------------------------------  OBSERVATIONS:  Mitral Valve: Mitral annular calcification and calcified  mitral leaflets with normal diastolic opening. Mean  transmitral valve gradient equals 3 mm Hg, consistent with  mild mitral stenosis.  Aortic Root: Normal aortic root.  Aortic Valve: Normal trileaflet aortic valve.  Left Atrium: Normal left atrium.  LA volume index = 23  cc/m2.  Left Ventricle: Normal left ventricular systolic function.  No segmental wall motion abnormalities. Normal left  ventricular internal dimensions and wall thicknesses.  (DT:157 ms).  Right Heart: Normal right atrium. Normal right ventricular  size and function. Normal tricuspid valve. Normal pulmonic  valve.  Mild pulmonic regurgitation.  Pericardium/PleuraNormal pericardium with no pericardial  effusion.  ------------------------------------------------------------------------  CONCLUSIONS:  1. Mitral annular calcification and calcified mitral  leaflets with normal diastolic opening. Mean transmitral  valve gradient equals 3 mm Hg, consistent with mild mitral  stenosis.  2. Normal trileaflet aortic valve.  3. Normal left ventricular systolic function. No segmental  wall motion abnormalities.  4. Normal right ventricular size and function.    < end of copied text >

## 2023-10-10 NOTE — CONSULT NOTE ADULT - ASSESSMENT
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 Sin/Dr. Singh) Presents to ED [facility] 10/10 with weakness for 2 weeks    anemia  esrd  weakness  DM  BKA  PAD  PVD  Pain    full code  lives with family at home - in [place]  HD as per renal  pain relief  oral hygiene  skin care  wound care  assist with needs

## 2023-10-10 NOTE — CONSULT NOTE ADULT - ASSESSMENT
S/p bilateral BKA with prosthetics  Pressure ulcer of the left post. knee due to prosthetic-unstageable

## 2023-10-11 ENCOUNTER — TRANSCRIPTION ENCOUNTER (OUTPATIENT)
Age: 48
End: 2023-10-11

## 2023-10-11 DIAGNOSIS — M72.6 NECROTIZING FASCIITIS: ICD-10-CM

## 2023-10-11 LAB
A1C WITH ESTIMATED AVERAGE GLUCOSE RESULT: 7.1 % — HIGH (ref 4–5.6)
ALBUMIN SERPL ELPH-MCNC: 1.9 G/DL — LOW (ref 3.3–5)
ALBUMIN SERPL ELPH-MCNC: 2.1 G/DL — LOW (ref 3.3–5)
ALBUMIN SERPL ELPH-MCNC: 2.1 G/DL — LOW (ref 3.3–5)
ALP SERPL-CCNC: 125 U/L — HIGH (ref 40–120)
ALP SERPL-CCNC: 125 U/L — HIGH (ref 40–120)
ALP SERPL-CCNC: 136 U/L — HIGH (ref 40–120)
ALT FLD-CCNC: 18 U/L — SIGNIFICANT CHANGE UP (ref 12–78)
ALT FLD-CCNC: 18 U/L — SIGNIFICANT CHANGE UP (ref 12–78)
ALT FLD-CCNC: 19 U/L — SIGNIFICANT CHANGE UP (ref 12–78)
ANION GAP SERPL CALC-SCNC: 12 MMOL/L — SIGNIFICANT CHANGE UP (ref 5–17)
ANION GAP SERPL CALC-SCNC: 13 MMOL/L — SIGNIFICANT CHANGE UP (ref 5–17)
ANION GAP SERPL CALC-SCNC: 16 MMOL/L — SIGNIFICANT CHANGE UP (ref 5–17)
AST SERPL-CCNC: 11 U/L — LOW (ref 15–37)
AST SERPL-CCNC: 12 U/L — LOW (ref 15–37)
AST SERPL-CCNC: 13 U/L — LOW (ref 15–37)
BASOPHILS # BLD AUTO: 0.09 K/UL — SIGNIFICANT CHANGE UP (ref 0–0.2)
BASOPHILS # BLD AUTO: 0.13 K/UL — SIGNIFICANT CHANGE UP (ref 0–0.2)
BASOPHILS NFR BLD AUTO: 0.5 % — SIGNIFICANT CHANGE UP (ref 0–2)
BASOPHILS NFR BLD AUTO: 0.6 % — SIGNIFICANT CHANGE UP (ref 0–2)
BILIRUB SERPL-MCNC: 0.7 MG/DL — SIGNIFICANT CHANGE UP (ref 0.2–1.2)
BILIRUB SERPL-MCNC: 0.7 MG/DL — SIGNIFICANT CHANGE UP (ref 0.2–1.2)
BILIRUB SERPL-MCNC: 0.9 MG/DL — SIGNIFICANT CHANGE UP (ref 0.2–1.2)
BUN SERPL-MCNC: 35 MG/DL — HIGH (ref 7–23)
BUN SERPL-MCNC: 37 MG/DL — HIGH (ref 7–23)
BUN SERPL-MCNC: 38 MG/DL — HIGH (ref 7–23)
CALCIUM SERPL-MCNC: 8.4 MG/DL — LOW (ref 8.5–10.1)
CALCIUM SERPL-MCNC: 8.8 MG/DL — SIGNIFICANT CHANGE UP (ref 8.5–10.1)
CALCIUM SERPL-MCNC: 9.1 MG/DL — SIGNIFICANT CHANGE UP (ref 8.5–10.1)
CHLORIDE SERPL-SCNC: 100 MMOL/L — SIGNIFICANT CHANGE UP (ref 96–108)
CHOLEST SERPL-MCNC: 51 MG/DL — SIGNIFICANT CHANGE UP
CO2 SERPL-SCNC: 20 MMOL/L — LOW (ref 22–31)
CO2 SERPL-SCNC: 23 MMOL/L — SIGNIFICANT CHANGE UP (ref 22–31)
CO2 SERPL-SCNC: 23 MMOL/L — SIGNIFICANT CHANGE UP (ref 22–31)
CREAT SERPL-MCNC: 8.9 MG/DL — HIGH (ref 0.5–1.3)
CREAT SERPL-MCNC: 9.1 MG/DL — HIGH (ref 0.5–1.3)
CREAT SERPL-MCNC: 9.4 MG/DL — HIGH (ref 0.5–1.3)
EGFR: 6 ML/MIN/1.73M2 — LOW
EGFR: 7 ML/MIN/1.73M2 — LOW
EGFR: 7 ML/MIN/1.73M2 — LOW
EOSINOPHIL # BLD AUTO: 0.07 K/UL — SIGNIFICANT CHANGE UP (ref 0–0.5)
EOSINOPHIL # BLD AUTO: 0.25 K/UL — SIGNIFICANT CHANGE UP (ref 0–0.5)
EOSINOPHIL NFR BLD AUTO: 0.3 % — SIGNIFICANT CHANGE UP (ref 0–6)
EOSINOPHIL NFR BLD AUTO: 1.4 % — SIGNIFICANT CHANGE UP (ref 0–6)
ESTIMATED AVERAGE GLUCOSE: 157 MG/DL — HIGH (ref 68–114)
GLUCOSE SERPL-MCNC: 193 MG/DL — HIGH (ref 70–99)
GLUCOSE SERPL-MCNC: 201 MG/DL — HIGH (ref 70–99)
GLUCOSE SERPL-MCNC: 219 MG/DL — HIGH (ref 70–99)
HBV SURFACE AB SER-ACNC: 5.4 MIU/ML — LOW
HBV SURFACE AG SER-ACNC: SIGNIFICANT CHANGE UP
HCT VFR BLD CALC: 28.4 % — LOW (ref 39–50)
HCT VFR BLD CALC: 36.7 % — LOW (ref 39–50)
HDLC SERPL-MCNC: 18 MG/DL — LOW
HGB BLD-MCNC: 11.8 G/DL — LOW (ref 13–17)
HGB BLD-MCNC: 9.2 G/DL — LOW (ref 13–17)
IMM GRANULOCYTES NFR BLD AUTO: 0.9 % — SIGNIFICANT CHANGE UP (ref 0–0.9)
IMM GRANULOCYTES NFR BLD AUTO: 0.9 % — SIGNIFICANT CHANGE UP (ref 0–0.9)
INR BLD: 1.32 RATIO — HIGH (ref 0.85–1.18)
LACTATE SERPL-SCNC: 0.7 MMOL/L — SIGNIFICANT CHANGE UP (ref 0.7–2)
LIPID PNL WITH DIRECT LDL SERPL: 17 MG/DL — SIGNIFICANT CHANGE UP
LYMPHOCYTES # BLD AUTO: 1.1 K/UL — SIGNIFICANT CHANGE UP (ref 1–3.3)
LYMPHOCYTES # BLD AUTO: 1.13 K/UL — SIGNIFICANT CHANGE UP (ref 1–3.3)
LYMPHOCYTES # BLD AUTO: 5.1 % — LOW (ref 13–44)
LYMPHOCYTES # BLD AUTO: 6.2 % — LOW (ref 13–44)
MAGNESIUM SERPL-MCNC: 2.5 MG/DL — SIGNIFICANT CHANGE UP (ref 1.6–2.6)
MCHC RBC-ENTMCNC: 30.1 PG — SIGNIFICANT CHANGE UP (ref 27–34)
MCHC RBC-ENTMCNC: 30.6 PG — SIGNIFICANT CHANGE UP (ref 27–34)
MCHC RBC-ENTMCNC: 32.2 GM/DL — SIGNIFICANT CHANGE UP (ref 32–36)
MCHC RBC-ENTMCNC: 32.4 GM/DL — SIGNIFICANT CHANGE UP (ref 32–36)
MCV RBC AUTO: 93.6 FL — SIGNIFICANT CHANGE UP (ref 80–100)
MCV RBC AUTO: 94.4 FL — SIGNIFICANT CHANGE UP (ref 80–100)
MONOCYTES # BLD AUTO: 0.48 K/UL — SIGNIFICANT CHANGE UP (ref 0–0.9)
MONOCYTES # BLD AUTO: 0.95 K/UL — HIGH (ref 0–0.9)
MONOCYTES NFR BLD AUTO: 2.2 % — SIGNIFICANT CHANGE UP (ref 2–14)
MONOCYTES NFR BLD AUTO: 5.3 % — SIGNIFICANT CHANGE UP (ref 2–14)
MRSA PCR RESULT.: DETECTED
NEUTROPHILS # BLD AUTO: 15.24 K/UL — HIGH (ref 1.8–7.4)
NEUTROPHILS # BLD AUTO: 19.99 K/UL — HIGH (ref 1.8–7.4)
NEUTROPHILS NFR BLD AUTO: 85.7 % — HIGH (ref 43–77)
NEUTROPHILS NFR BLD AUTO: 90.9 % — HIGH (ref 43–77)
NON HDL CHOLESTEROL: 33 MG/DL — SIGNIFICANT CHANGE UP
NRBC # BLD: 0 /100 WBCS — SIGNIFICANT CHANGE UP (ref 0–0)
NRBC # BLD: 0 /100 WBCS — SIGNIFICANT CHANGE UP (ref 0–0)
PHOSPHATE SERPL-MCNC: 4.3 MG/DL — SIGNIFICANT CHANGE UP (ref 2.5–4.5)
PLATELET # BLD AUTO: 386 K/UL — SIGNIFICANT CHANGE UP (ref 150–400)
PLATELET # BLD AUTO: 456 K/UL — HIGH (ref 150–400)
POTASSIUM SERPL-MCNC: 2.9 MMOL/L — CRITICAL LOW (ref 3.5–5.3)
POTASSIUM SERPL-MCNC: 3.6 MMOL/L — SIGNIFICANT CHANGE UP (ref 3.5–5.3)
POTASSIUM SERPL-MCNC: 3.8 MMOL/L — SIGNIFICANT CHANGE UP (ref 3.5–5.3)
POTASSIUM SERPL-SCNC: 2.9 MMOL/L — CRITICAL LOW (ref 3.5–5.3)
POTASSIUM SERPL-SCNC: 3.6 MMOL/L — SIGNIFICANT CHANGE UP (ref 3.5–5.3)
POTASSIUM SERPL-SCNC: 3.8 MMOL/L — SIGNIFICANT CHANGE UP (ref 3.5–5.3)
PROT SERPL-MCNC: 7.2 G/DL — SIGNIFICANT CHANGE UP (ref 6–8.3)
PROT SERPL-MCNC: 7.3 G/DL — SIGNIFICANT CHANGE UP (ref 6–8.3)
PROT SERPL-MCNC: 7.8 G/DL — SIGNIFICANT CHANGE UP (ref 6–8.3)
PROTHROM AB SERPL-ACNC: 15.3 SEC — HIGH (ref 9.5–13)
RBC # BLD: 3.01 M/UL — LOW (ref 4.2–5.8)
RBC # BLD: 3.92 M/UL — LOW (ref 4.2–5.8)
RBC # FLD: 13.9 % — SIGNIFICANT CHANGE UP (ref 10.3–14.5)
RBC # FLD: 15.7 % — HIGH (ref 10.3–14.5)
S AUREUS DNA NOSE QL NAA+PROBE: DETECTED
SODIUM SERPL-SCNC: 135 MMOL/L — SIGNIFICANT CHANGE UP (ref 135–145)
SODIUM SERPL-SCNC: 136 MMOL/L — SIGNIFICANT CHANGE UP (ref 135–145)
SODIUM SERPL-SCNC: 136 MMOL/L — SIGNIFICANT CHANGE UP (ref 135–145)
TRIGL SERPL-MCNC: 68 MG/DL — SIGNIFICANT CHANGE UP
VANCOMYCIN TROUGH SERPL-MCNC: 15.6 UG/ML — SIGNIFICANT CHANGE UP (ref 10–20)
WBC # BLD: 17.79 K/UL — HIGH (ref 3.8–10.5)
WBC # BLD: 21.99 K/UL — HIGH (ref 3.8–10.5)
WBC # FLD AUTO: 17.79 K/UL — HIGH (ref 3.8–10.5)
WBC # FLD AUTO: 21.99 K/UL — HIGH (ref 3.8–10.5)

## 2023-10-11 PROCEDURE — 88307 TISSUE EXAM BY PATHOLOGIST: CPT | Mod: 26

## 2023-10-11 PROCEDURE — 76604 US EXAM CHEST: CPT | Mod: 26

## 2023-10-11 PROCEDURE — 88311 DECALCIFY TISSUE: CPT | Mod: 26

## 2023-10-11 PROCEDURE — 99222 1ST HOSP IP/OBS MODERATE 55: CPT | Mod: 57

## 2023-10-11 PROCEDURE — 99291 CRITICAL CARE FIRST HOUR: CPT | Mod: 25

## 2023-10-11 PROCEDURE — 93308 TTE F-UP OR LMTD: CPT | Mod: 26

## 2023-10-11 PROCEDURE — 27598 AMPUTATE LOWER LEG AT KNEE: CPT | Mod: LT

## 2023-10-11 DEVICE — CLIP APPLIER COVIDIEN SURGICLIP 13" LARGE: Type: IMPLANTABLE DEVICE | Status: FUNCTIONAL

## 2023-10-11 RX ORDER — ACETAMINOPHEN 500 MG
1000 TABLET ORAL ONCE
Refills: 0 | Status: COMPLETED | OUTPATIENT
Start: 2023-10-11 | End: 2023-10-11

## 2023-10-11 RX ORDER — ALBUMIN HUMAN 25 %
250 VIAL (ML) INTRAVENOUS ONCE
Refills: 0 | Status: COMPLETED | OUTPATIENT
Start: 2023-10-11 | End: 2023-10-11

## 2023-10-11 RX ORDER — VANCOMYCIN HCL 1 G
1000 VIAL (EA) INTRAVENOUS ONCE
Refills: 0 | Status: DISCONTINUED | OUTPATIENT
Start: 2023-10-11 | End: 2023-10-11

## 2023-10-11 RX ORDER — ACETAMINOPHEN 500 MG
650 TABLET ORAL EVERY 4 HOURS
Refills: 0 | Status: DISCONTINUED | OUTPATIENT
Start: 2023-10-11 | End: 2023-10-19

## 2023-10-11 RX ORDER — SODIUM CHLORIDE 9 MG/ML
500 INJECTION INTRAMUSCULAR; INTRAVENOUS; SUBCUTANEOUS ONCE
Refills: 0 | Status: COMPLETED | OUTPATIENT
Start: 2023-10-11 | End: 2023-10-11

## 2023-10-11 RX ORDER — PHENYLEPHRINE HYDROCHLORIDE 10 MG/ML
0.5 INJECTION INTRAVENOUS
Qty: 40 | Refills: 0 | Status: DISCONTINUED | OUTPATIENT
Start: 2023-10-11 | End: 2023-10-11

## 2023-10-11 RX ORDER — DORZOLAMIDE HYDROCHLORIDE TIMOLOL MALEATE 20; 5 MG/ML; MG/ML
1 SOLUTION/ DROPS OPHTHALMIC
Refills: 0 | Status: DISCONTINUED | OUTPATIENT
Start: 2023-10-11 | End: 2023-10-19

## 2023-10-11 RX ORDER — INSULIN LISPRO 100/ML
2 VIAL (ML) SUBCUTANEOUS ONCE
Refills: 0 | Status: COMPLETED | OUTPATIENT
Start: 2023-10-11 | End: 2023-10-11

## 2023-10-11 RX ORDER — INSULIN LISPRO 100/ML
VIAL (ML) SUBCUTANEOUS EVERY 6 HOURS
Refills: 0 | Status: DISCONTINUED | OUTPATIENT
Start: 2023-10-11 | End: 2023-10-11

## 2023-10-11 RX ORDER — DEXTROSE 50 % IN WATER 50 %
15 SYRINGE (ML) INTRAVENOUS ONCE
Refills: 0 | Status: DISCONTINUED | OUTPATIENT
Start: 2023-10-11 | End: 2023-10-11

## 2023-10-11 RX ORDER — DEXTROSE 50 % IN WATER 50 %
15 SYRINGE (ML) INTRAVENOUS ONCE
Refills: 0 | Status: DISCONTINUED | OUTPATIENT
Start: 2023-10-11 | End: 2023-10-12

## 2023-10-11 RX ORDER — SENNA PLUS 8.6 MG/1
2 TABLET ORAL AT BEDTIME
Refills: 0 | Status: DISCONTINUED | OUTPATIENT
Start: 2023-10-11 | End: 2023-10-15

## 2023-10-11 RX ORDER — ONDANSETRON 8 MG/1
4 TABLET, FILM COATED ORAL ONCE
Refills: 0 | Status: DISCONTINUED | OUTPATIENT
Start: 2023-10-11 | End: 2023-10-11

## 2023-10-11 RX ORDER — DEXTROSE 50 % IN WATER 50 %
25 SYRINGE (ML) INTRAVENOUS ONCE
Refills: 0 | Status: DISCONTINUED | OUTPATIENT
Start: 2023-10-11 | End: 2023-10-19

## 2023-10-11 RX ORDER — MIDODRINE HYDROCHLORIDE 2.5 MG/1
15 TABLET ORAL ONCE
Refills: 0 | Status: COMPLETED | OUTPATIENT
Start: 2023-10-11 | End: 2023-10-11

## 2023-10-11 RX ORDER — HYDROMORPHONE HYDROCHLORIDE 2 MG/ML
0.5 INJECTION INTRAMUSCULAR; INTRAVENOUS; SUBCUTANEOUS
Refills: 0 | Status: DISCONTINUED | OUTPATIENT
Start: 2023-10-11 | End: 2023-10-11

## 2023-10-11 RX ORDER — PIPERACILLIN AND TAZOBACTAM 4; .5 G/20ML; G/20ML
3.38 INJECTION, POWDER, LYOPHILIZED, FOR SOLUTION INTRAVENOUS EVERY 12 HOURS
Refills: 0 | Status: DISCONTINUED | OUTPATIENT
Start: 2023-10-12 | End: 2023-10-17

## 2023-10-11 RX ORDER — SODIUM CHLORIDE 9 MG/ML
1000 INJECTION, SOLUTION INTRAVENOUS
Refills: 0 | Status: DISCONTINUED | OUTPATIENT
Start: 2023-10-11 | End: 2023-10-19

## 2023-10-11 RX ORDER — SODIUM CHLORIDE 9 MG/ML
1000 INJECTION INTRAMUSCULAR; INTRAVENOUS; SUBCUTANEOUS
Refills: 0 | Status: DISCONTINUED | OUTPATIENT
Start: 2023-10-11 | End: 2023-10-11

## 2023-10-11 RX ORDER — VANCOMYCIN HCL 1 G
500 VIAL (EA) INTRAVENOUS ONCE
Refills: 0 | Status: COMPLETED | OUTPATIENT
Start: 2023-10-11 | End: 2023-10-11

## 2023-10-11 RX ORDER — ACETAMINOPHEN 500 MG
650 TABLET ORAL EVERY 4 HOURS
Refills: 0 | Status: DISCONTINUED | OUTPATIENT
Start: 2023-10-11 | End: 2023-10-11

## 2023-10-11 RX ORDER — GLUCAGON INJECTION, SOLUTION 0.5 MG/.1ML
1 INJECTION, SOLUTION SUBCUTANEOUS ONCE
Refills: 0 | Status: DISCONTINUED | OUTPATIENT
Start: 2023-10-11 | End: 2023-10-19

## 2023-10-11 RX ORDER — SODIUM CHLORIDE 9 MG/ML
1000 INJECTION, SOLUTION INTRAVENOUS
Refills: 0 | Status: DISCONTINUED | OUTPATIENT
Start: 2023-10-11 | End: 2023-10-11

## 2023-10-11 RX ORDER — LACTOBACILLUS ACIDOPHILUS 100MM CELL
1 CAPSULE ORAL EVERY 12 HOURS
Refills: 0 | Status: DISCONTINUED | OUTPATIENT
Start: 2023-10-11 | End: 2023-10-19

## 2023-10-11 RX ORDER — HEPARIN SODIUM 5000 [USP'U]/ML
5000 INJECTION INTRAVENOUS; SUBCUTANEOUS EVERY 12 HOURS
Refills: 0 | Status: DISCONTINUED | OUTPATIENT
Start: 2023-10-11 | End: 2023-10-19

## 2023-10-11 RX ORDER — DEXTROSE 50 % IN WATER 50 %
12.5 SYRINGE (ML) INTRAVENOUS ONCE
Refills: 0 | Status: DISCONTINUED | OUTPATIENT
Start: 2023-10-11 | End: 2023-10-19

## 2023-10-11 RX ORDER — OXYCODONE HYDROCHLORIDE 5 MG/1
5 TABLET ORAL ONCE
Refills: 0 | Status: DISCONTINUED | OUTPATIENT
Start: 2023-10-11 | End: 2023-10-11

## 2023-10-11 RX ORDER — MIDODRINE HYDROCHLORIDE 2.5 MG/1
10 TABLET ORAL THREE TIMES A DAY
Refills: 0 | Status: DISCONTINUED | OUTPATIENT
Start: 2023-10-11 | End: 2023-10-14

## 2023-10-11 RX ORDER — HYDROMORPHONE HYDROCHLORIDE 2 MG/ML
1 INJECTION INTRAMUSCULAR; INTRAVENOUS; SUBCUTANEOUS
Refills: 0 | Status: DISCONTINUED | OUTPATIENT
Start: 2023-10-11 | End: 2023-10-11

## 2023-10-11 RX ORDER — LANOLIN ALCOHOL/MO/W.PET/CERES
3 CREAM (GRAM) TOPICAL AT BEDTIME
Refills: 0 | Status: DISCONTINUED | OUTPATIENT
Start: 2023-10-11 | End: 2023-10-19

## 2023-10-11 RX ORDER — INSULIN LISPRO 100/ML
VIAL (ML) SUBCUTANEOUS
Refills: 0 | Status: DISCONTINUED | OUTPATIENT
Start: 2023-10-11 | End: 2023-10-19

## 2023-10-11 RX ORDER — PANTOPRAZOLE SODIUM 20 MG/1
40 TABLET, DELAYED RELEASE ORAL
Refills: 0 | Status: DISCONTINUED | OUTPATIENT
Start: 2023-10-11 | End: 2023-10-13

## 2023-10-11 RX ORDER — PHENYLEPHRINE HYDROCHLORIDE 10 MG/ML
0.8 INJECTION INTRAVENOUS
Qty: 40 | Refills: 0 | Status: DISCONTINUED | OUTPATIENT
Start: 2023-10-11 | End: 2023-10-12

## 2023-10-11 RX ORDER — ONDANSETRON 8 MG/1
4 TABLET, FILM COATED ORAL EVERY 8 HOURS
Refills: 0 | Status: DISCONTINUED | OUTPATIENT
Start: 2023-10-11 | End: 2023-10-19

## 2023-10-11 RX ORDER — ATORVASTATIN CALCIUM 80 MG/1
40 TABLET, FILM COATED ORAL AT BEDTIME
Refills: 0 | Status: DISCONTINUED | OUTPATIENT
Start: 2023-10-11 | End: 2023-10-19

## 2023-10-11 RX ORDER — INSULIN GLARGINE 100 [IU]/ML
4 INJECTION, SOLUTION SUBCUTANEOUS AT BEDTIME
Refills: 0 | Status: DISCONTINUED | OUTPATIENT
Start: 2023-10-11 | End: 2023-10-12

## 2023-10-11 RX ADMIN — Medication 1: at 11:28

## 2023-10-11 RX ADMIN — MIDODRINE HYDROCHLORIDE 10 MILLIGRAM(S): 2.5 TABLET ORAL at 18:10

## 2023-10-11 RX ADMIN — DORZOLAMIDE HYDROCHLORIDE TIMOLOL MALEATE 1 DROP(S): 20; 5 SOLUTION/ DROPS OPHTHALMIC at 21:04

## 2023-10-11 RX ADMIN — PANTOPRAZOLE SODIUM 40 MILLIGRAM(S): 20 TABLET, DELAYED RELEASE ORAL at 04:58

## 2023-10-11 RX ADMIN — SODIUM CHLORIDE 500 MILLILITER(S): 9 INJECTION INTRAMUSCULAR; INTRAVENOUS; SUBCUTANEOUS at 05:06

## 2023-10-11 RX ADMIN — Medication 2: at 00:36

## 2023-10-11 RX ADMIN — PIPERACILLIN AND TAZOBACTAM 25 GRAM(S): 4; .5 INJECTION, POWDER, LYOPHILIZED, FOR SOLUTION INTRAVENOUS at 11:27

## 2023-10-11 RX ADMIN — MIDODRINE HYDROCHLORIDE 15 MILLIGRAM(S): 2.5 TABLET ORAL at 16:20

## 2023-10-11 RX ADMIN — Medication 1000 MILLIGRAM(S): at 06:21

## 2023-10-11 RX ADMIN — PIPERACILLIN AND TAZOBACTAM 25 GRAM(S): 4; .5 INJECTION, POWDER, LYOPHILIZED, FOR SOLUTION INTRAVENOUS at 00:34

## 2023-10-11 RX ADMIN — Medication 125 MILLILITER(S): at 21:02

## 2023-10-11 RX ADMIN — PHENYLEPHRINE HYDROCHLORIDE 11.1 MICROGRAM(S)/KG/MIN: 10 INJECTION INTRAVENOUS at 15:21

## 2023-10-11 RX ADMIN — Medication 2 UNIT(S): at 15:52

## 2023-10-11 RX ADMIN — ATORVASTATIN CALCIUM 40 MILLIGRAM(S): 80 TABLET, FILM COATED ORAL at 21:04

## 2023-10-11 RX ADMIN — HEPARIN SODIUM 5000 UNIT(S): 5000 INJECTION INTRAVENOUS; SUBCUTANEOUS at 21:04

## 2023-10-11 RX ADMIN — Medication 2: at 06:14

## 2023-10-11 RX ADMIN — MIDODRINE HYDROCHLORIDE 10 MILLIGRAM(S): 2.5 TABLET ORAL at 11:27

## 2023-10-11 RX ADMIN — INSULIN GLARGINE 4 UNIT(S): 100 INJECTION, SOLUTION SUBCUTANEOUS at 21:06

## 2023-10-11 RX ADMIN — Medication 3: at 21:17

## 2023-10-11 RX ADMIN — DORZOLAMIDE HYDROCHLORIDE TIMOLOL MALEATE 1 DROP(S): 20; 5 SOLUTION/ DROPS OPHTHALMIC at 04:58

## 2023-10-11 RX ADMIN — MIDODRINE HYDROCHLORIDE 10 MILLIGRAM(S): 2.5 TABLET ORAL at 04:57

## 2023-10-11 RX ADMIN — Medication 400 MILLIGRAM(S): at 15:31

## 2023-10-11 RX ADMIN — PIPERACILLIN AND TAZOBACTAM 25 GRAM(S): 4; .5 INJECTION, POWDER, LYOPHILIZED, FOR SOLUTION INTRAVENOUS at 23:43

## 2023-10-11 RX ADMIN — OXYCODONE HYDROCHLORIDE 5 MILLIGRAM(S): 5 TABLET ORAL at 21:52

## 2023-10-11 RX ADMIN — HEPARIN SODIUM 5000 UNIT(S): 5000 INJECTION INTRAVENOUS; SUBCUTANEOUS at 04:57

## 2023-10-11 RX ADMIN — Medication 1 TABLET(S): at 04:57

## 2023-10-11 RX ADMIN — Medication 2: at 18:11

## 2023-10-11 RX ADMIN — Medication 100 MILLIGRAM(S): at 19:11

## 2023-10-11 RX ADMIN — OXYCODONE HYDROCHLORIDE 5 MILLIGRAM(S): 5 TABLET ORAL at 17:10

## 2023-10-11 RX ADMIN — OXYCODONE HYDROCHLORIDE 5 MILLIGRAM(S): 5 TABLET ORAL at 16:40

## 2023-10-11 RX ADMIN — Medication 400 MILLIGRAM(S): at 05:06

## 2023-10-11 RX ADMIN — Medication 1 TABLET(S): at 18:09

## 2023-10-11 RX ADMIN — OXYCODONE HYDROCHLORIDE 5 MILLIGRAM(S): 5 TABLET ORAL at 23:29

## 2023-10-11 NOTE — DIETITIAN INITIAL EVALUATION ADULT - NS FNS DIET ORDER
What Type Of Note Output Would You Prefer (Optional)?: Standard Output
How Severe Is Your Skin Lesion?: moderate
Has Your Skin Lesion Been Treated?: not been treated
Is This A New Presentation, Or A Follow-Up?: Skin Lesions
Diet, NPO:   Except Medications (10-10-23 @ 13:06)

## 2023-10-11 NOTE — CONSULT NOTE ADULT - ASSESSMENT
Assessment:     48y old Male with stated hx significant for DM2, ESRD on H.D. (M/W/F via Rt brachial AVF, last session 10/9/23), Bilat BKA, s/p Rt forearm and 3rd digit of Rt hand wound gas gangrene infection, eventual Rt 3rd digit of Rt hand amputation (approx 2/2023)          Pt presented to [facility] 10/10/23 with progressive weakness over a 2 week period with progressive worsening of open Lt thigh wound with purulent drainage. Pt at that time was found to be HYPOtnsive with SBP of 60's, responsive to 1.75 liters IVF and midodrine therapy to SBP's of 90's, leukocytosis with WBC of 19.6. Received CT Lower extremity (10/10/23) that demonstrated gas tracking by left distal femur. Pt eventually transferred to floor, started on vanco/zosyn therapy. This short course c/b recurrent HYPOtn, development of fever with Tmax to 38.5 (P.O.) requiring emergent revision of Lt BKA to AKA with Lt leg abscess drainage of purulent fluid. Post op course c/b persistent HYPOtn refractory to midodrine requiring phenylephrine gtt      Consult called and pt admitted to ICU for septic shock secondary Lt wound abscess  Plan:    ####Neuro####  ==s/p Lt AKA  -Neuro checks q 2 hrs and prn for changes  -activity as tolerated  -physical therapy consult when stable  -Tylenol 650 mg po q 6 hrs prn pain 1-4/10 (not exceed 4 gms/day)  -Hydromorphone 0.5 mg IVP q 8 hrs prn pain 5-7/10  -Hydromorphone 1 mg IVP q 8 hrs prn pain 8-10/10    ####Pulm####  ==Utilizing N/C  -Supplemental O2 prn to maintain SPO2 > 92%  -Bronchodilators q 6 hrs prn for sob/wheezes  -HOB >/= 30 degree angle    ####CV####  ==septic/distributive shock  -phenylephrine gtt - titrate to maintain MAP 65-70 mmHg - attempt to titrate off  -midodrine 10 mg po q 8 hrs    ####GI/####  ==ESRD  -discuss with nephrology next H.D. session  -strict I & O's - keep even  -diet as tolerated    ####ID####  ==Lt BKA/AKA wound  -pan culture  -continue zosyn  -vanco titrate to trough of 15-20  -obtain vanco trough in a.m.  -Lt AKA wound care qd - wrap with betadine soaked abd pads, dress with cling    ####FEN/ENDO/HEME####  ==DM2  -obtain CMP/Mg++/PO--4/CBC w diff/PT/PTT/INR now and q. a.m.  -abg prn  -POC glucose q 4 hrs with ISS - maintain glucose 140-180  -continue lantus therapy        Critical Care Time: 40minutes   Reviewing data, imaging, discussing with multidisciplinary team, not inclusive of procedures, discussing goals of care with family

## 2023-10-11 NOTE — PHARMACOTHERAPY INTERVENTION NOTE - COMMENTS
Patient is a 48y M p/w sepsis likely due to infected wound, now s/p debridement in OR. Patient with PMH ESRD on HD MWF as outpatient, however no HD today due to procedure. S/p vancomycin 1 gram x1 on 10/10, random level this AM = 15.6 ug/mL. Discussed with ID Dr. Alonzo, will give vancomycin 500 mg IVPB tonight in setting of missed HD session and recommended trough level tomorrow AM prior to HD. Will follow vancomycin level and assess for future dose adjustments.

## 2023-10-11 NOTE — DIETITIAN INITIAL EVALUATION ADULT - ADD RECOMMEND
1) Pt to remain NPO at this time; recommend advancing diet to consistent carbohydrate, renal diet as medically fesible  2) Recommend Nepro shake BID and Bruce BID when applicable  3) Recommend Nephrovite daily  4) Monitor po intake, diet tolerance, weight trends, labs, GI function, skin integrity

## 2023-10-11 NOTE — DIETITIAN INITIAL EVALUATION ADULT - ETIOLOGY
related to increased demand for nutrient (kcal, protein, micronutrients) related to endocrine and renal dysfunction

## 2023-10-11 NOTE — PROGRESS NOTE ADULT - ASSESSMENT
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 Sin/Dr. Singh) Presents to ED [facility] 10/10 with weakness for 2 weeks. 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 Wound care consult and ID consults are requested .

## 2023-10-11 NOTE — CONSULT NOTE ADULT - SUBJECTIVE AND OBJECTIVE BOX
CHIEF COMPLAINT:    HPI:     48 yr old male with PMHx of DM2, ESRD on H.D. 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      Pt presented to [facility] 10/10/23 with progressive weakness over a 2 week period with progressive worsening of open Lt thigh wound with purulent drainage. Pt at that time was found to be HYPOtnsive with SBP of 60's, responsive to 1.75 liters IVF and midodrine therapy to SBP's of 90's, leukocytosis with WBC of 19.6. Received CT Lower extremity (10/10/23) that demonstrated gas tracking by left distal femur. Pt eventually transferred to floor, started on vanco/zosyn therapy. This short course c/b recurrent HYPOtn, development of fever with Tmax to 38.5 (P.O.) requiring emergent revision of Lt BKA to AKA with Lt leg abscess drainage of purulent fluid. Post op course c/b persistent HYPOtn requiring phenylephrine gtt      Consult called and pt admitted to ICU for septic shock secondary Lt wound abscess         PAST MEDICAL & SURGICAL HISTORY:  ESRD on dialysis      H/O hypotension      Diabetes mellitus      Leg wound, left      Steal syndrome dialysis vascular access      Gangrene of finger of right hand      PVD (peripheral vascular disease)      Anemia of chronic disease      Constipation      HLD (hyperlipidemia)      S/P BKA (below knee amputation) bilateral      AV fistula          FAMILY HISTORY:      SOCIAL HISTORY:  Smoking: [ ] Never Smoked [ ] Former Smoker (__ packs x ___ years) [ ] Current Smoker  (__ packs x ___ years)  Substance Use: [ ] Never Used [ ] Used ____  EtOH Use:  Marital Status: [ ] Single [ ]  [ ]  [ ]   Sexual History:   Occupation:  Recent Travel:  Country of Birth:  Advance Directives:    Allergies    No Known Drug Allergies  [place] (Rash)    Intolerances        HOME MEDICATIONS:    REVIEW OF SYSTEMS:            OBJECTIVE:  ICU Vital Signs Last 24 Hrs  T(C): 37.1 (11 Oct 2023 12:37), Max: 38.5 (11 Oct 2023 05:00)  T(F): 98.8 (11 Oct 2023 12:37), Max: 101.3 (11 Oct 2023 05:00)  HR: 78 (11 Oct 2023 12:37) (75 - 89)  BP: 109/48 (11 Oct 2023 12:37) (75/45 - 110/59)  BP(mean): --  ABP: --  ABP(mean): --  RR: 14 (11 Oct 2023 12:37) (14 - 19)  SpO2: 99% (11 Oct 2023 12:37) (97% - 99%)    O2 Parameters below as of 11 Oct 2023 11:40  Patient On (Oxygen Delivery Method): room air              CAPILLARY BLOOD GLUCOSE      POCT Blood Glucose.: 226 mg/dL (11 Oct 2023 15:16)      PHYSICAL EXAM:        HOSPITAL MEDICATIONS:  MEDICATIONS  (STANDING):  atorvastatin 40 milliGRAM(s) Oral at bedtime  dextrose 50% Injectable 25 Gram(s) IV Push once  dextrose 50% Injectable 12.5 Gram(s) IV Push once  dextrose 50% Injectable 25 Gram(s) IV Push once  dorzolamide 2%/timolol 0.5% Ophthalmic Solution 1 Drop(s) Both EYES two times a day  glucagon  Injectable 1 milliGRAM(s) IntraMuscular once  insulin lispro (ADMELOG) corrective regimen sliding scale   SubCutaneous every 6 hours  lactated ringers. 1000 milliLiter(s) (75 mL/Hr) IV Continuous <Continuous>  lactobacillus acidophilus 1 Tablet(s) Oral every 12 hours  midodrine. 10 milliGRAM(s) Oral three times a day  pantoprazole    Tablet 40 milliGRAM(s) Oral before breakfast  phenylephrine    Infusion 0.5 MICROgram(s)/kG/Min (11.1 mL/Hr) IV Continuous <Continuous>  piperacillin/tazobactam IVPB.. 3.375 Gram(s) IV Intermittent every 12 hours  senna 2 Tablet(s) Oral at bedtime  sodium chloride 0.9%. 1000 milliLiter(s) (50 mL/Hr) IV Continuous <Continuous>  vancomycin  IVPB 500 milliGRAM(s) IV Intermittent once    MEDICATIONS  (PRN):  acetaminophen     Tablet .. 650 milliGRAM(s) Oral every 4 hours PRN Temp greater or equal to 38C (100.4F), Mild Pain (1 - 3)  aluminum hydroxide/magnesium hydroxide/simethicone Suspension 30 milliLiter(s) Oral every 4 hours PRN Dyspepsia  dextrose Oral Gel 15 Gram(s) Oral once PRN Blood Glucose LESS THAN 70 milliGRAM(s)/deciliter  HYDROmorphone  Injectable 1 milliGRAM(s) IV Push every 10 minutes PRN Severe Pain (7 - 10)  HYDROmorphone  Injectable 0.5 milliGRAM(s) IV Push every 10 minutes PRN Moderate Pain (4 - 6)  melatonin 3 milliGRAM(s) Oral at bedtime PRN Insomnia  ondansetron Injectable 4 milliGRAM(s) IV Push every 8 hours PRN Nausea and/or Vomiting  ondansetron Injectable 4 milliGRAM(s) IV Push once PRN Nausea and/or Vomiting  oxyCODONE    IR 5 milliGRAM(s) Oral once PRN Moderate Pain (4 - 6)      LABS:                        9.2    17.79 )-----------( 386      ( 11 Oct 2023 06:50 )             28.4     Hgb Trend: 9.2<--, 10.3<--  10-11    135  |  100  |  37<H>  ----------------------------<  193<H>  3.6   |  23  |  9.10<H>    Ca    9.1      11 Oct 2023 10:17    TPro  7.3  /  Alb  2.1<L>  /  TBili  0.7  /  DBili  x   /  AST  13<L>  /  ALT  19  /  AlkPhos  125<H>  10-11    Creatinine Trend: 9.10<--, 8.90<--, 8.20<--  PT/INR - ( 11 Oct 2023 06:50 )   PT: 15.3 sec;   INR: 1.32 ratio         PTT - ( 10 Oct 2023 11:15 )  PTT:29.2 sec  Urinalysis Basic - ( 11 Oct 2023 10:17 )    Color: x / Appearance: x / SG: x / pH: x  Gluc: 193 mg/dL / Ketone: x  / Bili: x / Urobili: x   Blood: x / Protein: x / Nitrite: x   Leuk Esterase: x / RBC: x / WBC x   Sq Epi: x / Non Sq Epi: x / Bacteria: x            MICROBIOLOGY:     RADIOLOGY:  [ ] Reviewed and interpreted by me    EKG:        [facility]iffany [facility] (ext. [id number])           CHIEF COMPLAINT:    HPI:     48 yr old male with PMHx of DM2, ESRD on H.D. 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      Pt presented to [facility] 10/10/23 with progressive weakness over a 2 week period with progressive worsening of open Lt thigh wound with purulent drainage. Pt at that time was found to be HYPOtnsive with SBP of 60's, responsive to 1.75 liters IVF and midodrine therapy to SBP's of 90's, leukocytosis with WBC of 19.6. Received CT Lower extremity (10/10/23) that demonstrated gas tracking by left distal femur. Pt eventually transferred to floor, started on vanco/zosyn therapy. This short course c/b recurrent HYPOtn, development of fever with Tmax to 38.5 (P.O.) requiring emergent revision of Lt BKA to AKA with Lt leg abscess drainage of purulent fluid. Post op course c/b persistent HYPOtn refractory to midodrine requiring phenylephrine gtt      Consult called and pt admitted to ICU for septic shock secondary Lt wound abscess         PAST MEDICAL & SURGICAL HISTORY:  ESRD on dialysis      H/O hypotension      Diabetes mellitus      Leg wound, left      Steal syndrome dialysis vascular access      Gangrene of finger of right hand      PVD (peripheral vascular disease)      Anemia of chronic disease      Constipation      HLD (hyperlipidemia)      S/P BKA (below knee amputation) bilateral      AV fistula          FAMILY HISTORY:      SOCIAL HISTORY:  Smoking: [ ] Never Smoked [ ] Former Smoker (__ packs x ___ years) [ ] Current Smoker  (__ packs x ___ years)  Substance Use: [ ] Never Used [ ] Used ____  EtOH Use:  Marital Status: [ ] Single [ ]  [ ]  [ ]   Sexual History:   Occupation:  Recent Travel:  Country of Birth:  Advance Directives:    Allergies    No Known Drug Allergies  [place] (Rash)    Intolerances        HOME MEDICATIONS:    REVIEW OF SYSTEMS:    CONSTITUTIONAL:           No weakness, fevers or chills  EYES/ENT:           No visual changes;  No vertigo or throat pain   NECK:            No pain or stiffness  RESPIRATORY:            No cough, wheezing, hemoptysis; No shortness of breath  CARDIOVASCULAR:            No chest pain or palpitations  GASTROINTESTINAL:           No abdominal or epigastric pain. No nausea, vomiting, or hematemesis; No diarrhea or constipation. No melena or hematochezia.  GENITOURINARY:            No dysuria, frequency or hematuria  NEUROLOGICAL:            No numbness or weakness  SKIN:            No pruritis , rashes        OBJECTIVE:  ICU Vital Signs Last 24 Hrs  T(C): 37.1 (11 Oct 2023 12:37), Max: 38.5 (11 Oct 2023 05:00)  T(F): 98.8 (11 Oct 2023 12:37), Max: 101.3 (11 Oct 2023 05:00)  HR: 78 (11 Oct 2023 12:37) (75 - 89)  BP: 109/48 (11 Oct 2023 12:37) (75/45 - 110/59)  BP(mean): --  ABP: --  ABP(mean): --  RR: 14 (11 Oct 2023 12:37) (14 - 19)  SpO2: 99% (11 Oct 2023 12:37) (97% - 99%)    O2 Parameters below as of 11 Oct 2023 11:40  Patient On (Oxygen Delivery Method): room air    VITAL SIGNS AT TIME OF CONSULT  BP: 125/56   ; HR: 76 (NSR)   ; RR: 16   ; SPO2: 100% (2 liters N/C)   ; Temp:      CAPILLARY BLOOD GLUCOSE      POCT Blood Glucose.: 226 mg/dL (11 Oct 2023 15:16)      PHYSICAL EXAM:  GENERAL:   NAD, well-groomed, multiple amputations    HEAD:    Atraumatic, Normocephalic    EYES:   EOMI, PERRLA 3 mm, conjunctiva and sclera clear    ENMT:   No oropharyngeal exudates, erythema or lesions,  Moist mucous membranes    NECK:   Supple, no cervical lymphadenopathy, no JVD    NERVOUS SYSTEM:  Alert & Oriented X3, CN II-XII intact, has spontaneous purposeful movement of all extremities; Upper extremities  4/5; Lower extremities 2-3/5 (Rt BKA, Lt AKA), full sensation to light touch     CHEST/LUNG:   utilizing 2 liters N/C   .Breath sounds bilaterally,  No crackles, rhonchi, wheezing, or rubs. POCUS     HEART:   cardiac monitor  NSR without ectopy  ; S1/S2 No murmurs, rubs, or gallops    ABDOMEN:   Soft, Nontender, Nondistended; Bowel sounds present, Bladder non distended, non palpable    EXTREMITIES:   Pulses palpable radial pulses 1+ bilat, fem pulses 2+ bilat digits of hands warm with good cap refil < 3 secs    SKIN:   warm, dry, intact, normal color, no rash or abnormal lesions, without palpable nodes, Lt surg dressing dry and intact      HOSPITAL MEDICATIONS:  MEDICATIONS  (STANDING):  atorvastatin 40 milliGRAM(s) Oral at bedtime  dextrose 50% Injectable 25 Gram(s) IV Push once  dextrose 50% Injectable 12.5 Gram(s) IV Push once  dextrose 50% Injectable 25 Gram(s) IV Push once  dorzolamide 2%/timolol 0.5% Ophthalmic Solution 1 Drop(s) Both EYES two times a day  glucagon  Injectable 1 milliGRAM(s) IntraMuscular once  insulin lispro (ADMELOG) corrective regimen sliding scale   SubCutaneous every 6 hours  lactated ringers. 1000 milliLiter(s) (75 mL/Hr) IV Continuous <Continuous>  lactobacillus acidophilus 1 Tablet(s) Oral every 12 hours  midodrine. 10 milliGRAM(s) Oral three times a day  pantoprazole    Tablet 40 milliGRAM(s) Oral before breakfast  phenylephrine    Infusion 0.5 MICROgram(s)/kG/Min (11.1 mL/Hr) IV Continuous <Continuous>  piperacillin/tazobactam IVPB.. 3.375 Gram(s) IV Intermittent every 12 hours  senna 2 Tablet(s) Oral at bedtime  sodium chloride 0.9%. 1000 milliLiter(s) (50 mL/Hr) IV Continuous <Continuous>  vancomycin  IVPB 500 milliGRAM(s) IV Intermittent once    MEDICATIONS  (PRN):  acetaminophen     Tablet .. 650 milliGRAM(s) Oral every 4 hours PRN Temp greater or equal to 38C (100.4F), Mild Pain (1 - 3)  aluminum hydroxide/magnesium hydroxide/simethicone Suspension 30 milliLiter(s) Oral every 4 hours PRN Dyspepsia  dextrose Oral Gel 15 Gram(s) Oral once PRN Blood Glucose LESS THAN 70 milliGRAM(s)/deciliter  HYDROmorphone  Injectable 1 milliGRAM(s) IV Push every 10 minutes PRN Severe Pain (7 - 10)  HYDROmorphone  Injectable 0.5 milliGRAM(s) IV Push every 10 minutes PRN Moderate Pain (4 - 6)  melatonin 3 milliGRAM(s) Oral at bedtime PRN Insomnia  ondansetron Injectable 4 milliGRAM(s) IV Push every 8 hours PRN Nausea and/or Vomiting  ondansetron Injectable 4 milliGRAM(s) IV Push once PRN Nausea and/or Vomiting  oxyCODONE    IR 5 milliGRAM(s) Oral once PRN Moderate Pain (4 - 6)      LABS:                        9.2    17.79 )-----------( 386      ( 11 Oct 2023 06:50 )             28.4     Hgb Trend: 9.2<--, 10.3<--  10-11    135  |  100  |  37<H>  ----------------------------<  193<H>  3.6   |  23  |  9.10<H>    Ca    9.1      11 Oct 2023 10:17    TPro  7.3  /  Alb  2.1<L>  /  TBili  0.7  /  DBili  x   /  AST  13<L>  /  ALT  19  /  AlkPhos  125<H>  10-11    Creatinine Trend: 9.10<--, 8.90<--, 8.20<--  PT/INR - ( 11 Oct 2023 06:50 )   PT: 15.3 sec;   INR: 1.32 ratio         PTT - ( 10 Oct 2023 11:15 )  PTT:29.2 sec  Urinalysis Basic - ( 11 Oct 2023 10:17 )    Color: x / Appearance: x / SG: x / pH: x  Gluc: 193 mg/dL / Ketone: x  / Bili: x / Urobili: x   Blood: x / Protein: x / Nitrite: x   Leuk Esterase: x / RBC: x / WBC x   Sq Epi: x / Non Sq Epi: x / Bacteria: x            MICROBIOLOGY:     RADIOLOGY:  [ ] Reviewed and interpreted by me    EKG:        Tiffany [facility]-BC (ext. [id number])

## 2023-10-11 NOTE — DIETITIAN INITIAL EVALUATION ADULT - SIGNS/SYMPTOMS
as evidenced by ESRD on HD, infected wound to L knee stump.  as evidenced by hyperglycemia, elevated BUN/Cr levels.

## 2023-10-11 NOTE — CONSULT NOTE ADULT - NS ATTEND AMEND GEN_ALL_CORE FT
LLE BKA stump gas gangrene  Hypotensive and febrile with gas gangrene on CT imaging  Taken emergently to OR for knee disarticulation   All questions answered

## 2023-10-11 NOTE — CONSULT NOTE ADULT - ASSESSMENT
47 yo male with multiple comorbidities and bilateral BKA was consulted for necrotizing fascitis  plan: OR for aka

## 2023-10-11 NOTE — DIETITIAN INITIAL EVALUATION ADULT - PERTINENT LABORATORY DATA
10-11    135  |  100  |  37<H>  ----------------------------<  193<H>  3.6   |  23  |  9.10<H>    Ca    9.1      11 Oct 2023 10:17    TPro  7.3  /  Alb  2.1<L>  /  TBili  0.7  /  DBili  x   /  AST  13<L>  /  ALT  19  /  AlkPhos  125<H>  10-11  POCT Blood Glucose.: 174 mg/dL (10-11-23 @ 11:17)  A1C with Estimated Average Glucose Result: 5.5 % (03-08-23 @ 05:58)  A1C with Estimated Average Glucose Result: 7.1 % (01-10-23 @ 16:38)

## 2023-10-11 NOTE — PROGRESS NOTE ADULT - SUBJECTIVE AND OBJECTIVE BOX
PROGRESS NOTE-  Patient is a 48y old  Male who presents with a chief complaint of Other injury of unspecified body region, initial encounter   (11 Oct 2023 11:21)  Chart and available morning labs /imaging are reviewed electronically , urgent issues addressed . More information  is being added upon completion of rounds , when more information is collected and management discussed with consultants , medical staff and social service/case management on the floor   OVERNIGHT-  CT LLE report given on a phone by radiologist at 8.55 am and surgery & vascular consults were requested (  and ). Nephrologist  is informed of K level and patient will be dialyzed now . ,ID and Dr Pickett ,intensivist are aware of imaging  report suggestive of necrotising fasciitis  HPI:  49 yo malewith PMH of DM2, b/l BKA, ESRD (on HD MWF) ,infection  right third finger 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 Sin/Dr. Singh) Presents to ED [facility] 10/10 with weakness for 2 weeks. 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 Wound care consult and ID consults are requested . (10 Oct 2023 12:52)    PAST MEDICAL & SURGICAL HISTORY:  ESRD on dialysis      H/O hypotension      Diabetes mellitus      Leg wound, left      Steal syndrome dialysis vascular access      Gangrene of finger of right hand      PVD (peripheral vascular disease)      Anemia of chronic disease      Constipation      HLD (hyperlipidemia)      S/P BKA (below knee amputation) bilateral      AV fistula          MEDICATIONS  (STANDING):  atorvastatin 40 milliGRAM(s) Oral at bedtime  collagenase Ointment 1 Application(s) Topical daily  dextrose 5%. 1000 milliLiter(s) (50 mL/Hr) IV Continuous <Continuous>  dextrose 5%. 1000 milliLiter(s) (100 mL/Hr) IV Continuous <Continuous>  dextrose 50% Injectable 25 Gram(s) IV Push once  dextrose 50% Injectable 25 Gram(s) IV Push once  dextrose 50% Injectable 12.5 Gram(s) IV Push once  dorzolamide 2%/timolol 0.5% Ophthalmic Solution 1 Drop(s) Both EYES two times a day  glucagon  Injectable 1 milliGRAM(s) IntraMuscular once  heparin   Injectable 5000 Unit(s) SubCutaneous every 12 hours  insulin lispro (ADMELOG) corrective regimen sliding scale   SubCutaneous every 6 hours  lactobacillus acidophilus 1 Tablet(s) Oral every 12 hours  midodrine. 10 milliGRAM(s) Oral three times a day  pantoprazole    Tablet 40 milliGRAM(s) Oral before breakfast  piperacillin/tazobactam IVPB.. 3.375 Gram(s) IV Intermittent every 12 hours  senna 2 Tablet(s) Oral at bedtime  vancomycin  IVPB 1000 milliGRAM(s) IV Intermittent once    MEDICATIONS  (PRN):  acetaminophen     Tablet .. 650 milliGRAM(s) Oral every 4 hours PRN Temp greater or equal to 38C (100.4F), Mild Pain (1 - 3)  aluminum hydroxide/magnesium hydroxide/simethicone Suspension 30 milliLiter(s) Oral every 4 hours PRN Dyspepsia  dextrose Oral Gel 15 Gram(s) Oral once PRN Blood Glucose LESS THAN 70 milliGRAM(s)/deciliter  melatonin 3 milliGRAM(s) Oral at bedtime PRN Insomnia  ondansetron Injectable 4 milliGRAM(s) IV Push every 8 hours PRN Nausea and/or Vomiting      OBJECTIVE    T(C): 37 (10-11-23 @ 11:40), Max: 38.5 (10-11-23 @ 05:00)  HR: 75 (10-11-23 @ 11:40) (75 - 138)  BP: 105/60 (10-11-23 @ 06:21) (74/49 - 110/59)  RR: 16 (10-11-23 @ 11:40) (16 - 20)  SpO2: 99% (10-11-23 @ 11:40) (97% - 100%)  Wt(kg): --  I&O's Summary        REVIEW OF SYSTEMS:  CONSTITUTIONAL: fever  EYES: No eye pain, visual disturbances, or discharge  ENMT:   No sinus or throat pain  NECK: No pain or stiffness  RESPIRATORY: No cough, wheezing, chills or hemoptysis; No shortness of breath  CARDIOVASCULAR: No chest pain, palpitations, dizziness, or leg swelling  GASTROINTESTINAL: No abdominal pain. No nausea, vomiting; No diarrhea or constipation. No melena or hematochezia.  GENITOURINARY: No dysuria, frequency, hematuria, or incontinence  NEUROLOGICAL: No headaches, memory loss, loss of strength, numbness, or tremors  SKIN: lle wound   MUSCULOSKELETAL: s/p b/l bka LLE wound     PHYSICAL EXAM:  Appearance: NAD. VS past 24 hrs -as above   HEENT:   Moist oral mucosa. Conjunctiva clear b/l.   Neck : supple  Respiratory: Lungs CTAB.  Gastrointestinal:  Soft, nontender. No rebound. No rigidity. BS present	  Cardiovascular: RRR ,S1S2 present  Neurologic: Non-focal. Moving all extremities.  Extremities: b/l bka ,left leg wound with drainage   Skin: No rashes, No ecchymoses, No cyanosis.	  wounds ,skin lesions-See skin assesment flow sheet   LABS:                        9.2    17.79 )-----------( 386      ( 11 Oct 2023 06:50 )             28.4     10-11    135  |  100  |  37<H>  ----------------------------<  193<H>  3.6   |  23  |  9.10<H>    Ca    9.1      11 Oct 2023 10:17    TPro  7.3  /  Alb  2.1<L>  /  TBili  0.7  /  DBili  x   /  AST  13<L>  /  ALT  19  /  AlkPhos  125<H>  10-11    CAPILLARY BLOOD GLUCOSE      POCT Blood Glucose.: 174 mg/dL (11 Oct 2023 11:17)  POCT Blood Glucose.: 202 mg/dL (11 Oct 2023 05:42)  POCT Blood Glucose.: 250 mg/dL (11 Oct 2023 00:35)  POCT Blood Glucose.: 320 mg/dL (10 Oct 2023 18:00)    PT/INR - ( 11 Oct 2023 06:50 )   PT: 15.3 sec;   INR: 1.32 ratio         PTT - ( 10 Oct 2023 11:15 )  PTT:29.2 sec  Urinalysis Basic - ( 11 Oct 2023 10:17 )    Color: x / Appearance: x / SG: x / pH: x  Gluc: 193 mg/dL / Ketone: x  / Bili: x / Urobili: x   Blood: x / Protein: x / Nitrite: x   Leuk Esterase: x / RBC: x / WBC x   Sq Epi: x / Non Sq Epi: x / Bacteria: x        RADIOLOGY & ADDITIONAL TESTS:< from: CT Lower Extremity No Cont, Left (10.10.23 @ 19:30) >  ACC: [id number] EXAM:  CT LWR EXT LT   ORDERED BY: MARÍA BUI     PROCEDURE DATE:  10/10/2023          INTERPRETATION:  CT OF THE LEFT LOWER EXTREMITY    CLINICAL INFORMATION: Infected left posterior thigh wound    COMPARISON: None available.    TECHNIQUE: Axial CT images were obtained of the left lower extremity from   the hip through the knee with coronal and sagittal reconstructions. No   intravenous contrast was administered.    FINDINGS:    Osseous: Status post remote below-the-knee amputation. No acute fracture   or dislocation. No definite focal cortical erosion or aggressive   periosteal reaction. Mild to moderate left hip arthrosis without   significant effusion. Moderate patellofemoral compartment predominant   left knee arthrosis with small volume effusion.    Soft tissues: Deep soft tissue ulceration along the posterior medial   margin of the knee at the level of the femoral condyles. Surrounding   intramuscular and perifascial soft tissue emphysema tracking cranially   along the myotendinous junction of the semitendinosus up to the level of   the distal femoral diaphysis and caudally into the pes anserinus bursa.   Inflammatory stranding most prominent in and about the semimembranosus   bursa, but without sizable hyperattenuating hematoma or drainable   encapsulated fluid collection on this non-IV contrast enhanced   examination. Advanced atherosclerosis. Prominent nonpathologically   enlarged reactive lymph nodes.    IMPRESSION:    Status post remote left below-the-knee amputation. Deep soft tissue   ulceration along the posterior medial margin of the knee with surrounding   soft tissue intramuscular and perifascial soft tissue emphysema tracking   cranially along the semitendinosus up to the level of the distal femoral   shaft and caudally into the pes anserinus bursa. Differential includes   necrotizing fasciitis in the proper clinical setting. No drainable mature   abscess on this non-IV contrast enhanced examination. Recommend surgical   consultation.     < end of copied text >     reviewed elctronically  ASSESSMENT/PLAN: 	    25 minutes aggregate time was spent on this visit, 50% visit time spent in care co-ordination with other attendings and counselling patient .I have discussed care plan with patient / HCP/family member ,who expressed understanding of problems treatment and their effect and side effects, alternatives in details. I have asked if they have any questions and concerns and appropriately addressed them to best of my ability.

## 2023-10-11 NOTE — CARE COORDINATION ASSESSMENT. - OTHER PERTINENT REFERRAL INFORMATION
SW met with pt at bedside, aox4. Pt resides in a private home with his spouse and two sons; there are 0 GALDINO. Pt reports being independent with ADL's PTA. Pt goes to [facility] in [place] (P#[phone]) (F#[phone]). SW to send clinicals to [facility] when pt is medically cleared for dc. Pt goes M, W, F at 3:30PM; utilizes Medicaid transportation in order to get to his appointments. Pt reports that he goes to outpatient PT in [place].

## 2023-10-11 NOTE — CHART NOTE - NSCHARTNOTEFT_GEN_A_CORE
CT LLE report given on a phone by radiologist at 8.55 am and surgery & vascular consults were requested (  and ). Nephrologist  is informed of K level and patient will be dialyzed now . ,ID and Dr Pickett ,intensivist are aware of imaging  report suggestive of necrotising fasciitis .

## 2023-10-11 NOTE — CHART NOTE - NSCHARTNOTEFT_GEN_A_CORE
Vascular Surgery Attending     Full consult note to follow. 48M with infection of LLE BKA stump. Febrile and hypotensive this am with WBC 17. Stump with purulent drainage from ulcer however has gas tracking by distal femur. Will take emergently for LLE knee disarticulation.    HUGO Chiang MD

## 2023-10-11 NOTE — PROGRESS NOTE ADULT - SUBJECTIVE AND OBJECTIVE BOX
CHIEF COMPLAINT/ REASON FOR VISIT  .. Patient was seen to address the  issue listed under PROBLEM LIST which is located toward bottom of this note     MELVI RODRIGUEZ    PLV TELE 315 D1    Allergies    No Known Drug Allergies  [place] (Rash)    Intolerances        PAST MEDICAL & SURGICAL HISTORY:  ESRD on dialysis      H/O hypotension      Diabetes mellitus      Leg wound, left      Steal syndrome dialysis vascular access      Gangrene of finger of right hand      PVD (peripheral vascular disease)      Anemia of chronic disease      Constipation      HLD (hyperlipidemia)      S/P BKA (below knee amputation) bilateral      AV fistula          FAMILY HISTORY:      Home Medications:  Admelog 100 units/mL injectable solution: 4 unit(s) subcutaneously 3 times a day (10 Oct 2023 14:32)  amLODIPine 5 mg oral tablet: 1 tab(s) orally once a day (10 Oct 2023 14:32)  atorvastatin 40 mg oral tablet: 1 tab(s) orally once a day (10 Oct 2023 14:32)  insulin glargine 100 units/mL subcutaneous solution: 4 unit(s) subcutaneous once a day (at bedtime) (10 Oct 2023 14:32)  senna (sennosides) 8.6 mg oral tablet: 2 tab(s) orally once a day (at bedtime) (10 Oct 2023 14:32)  Velphoro 500 mg oral tablet, chewable: 3 tab(s) chewed 3 times a day (10 Oct 2023 14:32)      MEDICATIONS  (STANDING):  atorvastatin 40 milliGRAM(s) Oral at bedtime  collagenase Ointment 1 Application(s) Topical daily  dextrose 5%. 1000 milliLiter(s) (100 mL/Hr) IV Continuous <Continuous>  dextrose 5%. 1000 milliLiter(s) (50 mL/Hr) IV Continuous <Continuous>  dextrose 50% Injectable 25 Gram(s) IV Push once  dextrose 50% Injectable 12.5 Gram(s) IV Push once  dextrose 50% Injectable 25 Gram(s) IV Push once  dorzolamide 2%/timolol 0.5% Ophthalmic Solution 1 Drop(s) Both EYES two times a day  glucagon  Injectable 1 milliGRAM(s) IntraMuscular once  heparin   Injectable 5000 Unit(s) SubCutaneous every 12 hours  insulin lispro (ADMELOG) corrective regimen sliding scale   SubCutaneous every 6 hours  lactobacillus acidophilus 1 Tablet(s) Oral every 12 hours  midodrine. 10 milliGRAM(s) Oral three times a day  pantoprazole    Tablet 40 milliGRAM(s) Oral before breakfast  piperacillin/tazobactam IVPB.. 3.375 Gram(s) IV Intermittent every 12 hours  senna 2 Tablet(s) Oral at bedtime    MEDICATIONS  (PRN):  acetaminophen     Tablet .. 650 milliGRAM(s) Oral every 6 hours PRN Temp greater or equal to 38C (100.4F), Mild Pain (1 - 3)  aluminum hydroxide/magnesium hydroxide/simethicone Suspension 30 milliLiter(s) Oral every 4 hours PRN Dyspepsia  dextrose Oral Gel 15 Gram(s) Oral once PRN Blood Glucose LESS THAN 70 milliGRAM(s)/deciliter  melatonin 3 milliGRAM(s) Oral at bedtime PRN Insomnia  ondansetron Injectable 4 milliGRAM(s) IV Push every 8 hours PRN Nausea and/or Vomiting      Diet, NPO:   Except Medications (10-10-23 @ 13:06) [Active]          Vital Signs Last 24 Hrs  T(C): 38.4 (11 Oct 2023 06:21), Max: 38.5 (11 Oct 2023 05:00)  T(F): 101.1 (11 Oct 2023 06:21), Max: 101.3 (11 Oct 2023 05:00)  HR: 85 (11 Oct 2023 05:00) (75 - 138)  BP: 100/50 (11 Oct 2023 05:37) (66/41 - 110/59)  BP(mean): --  RR: 17 (11 Oct 2023 05:00) (17 - 20)  SpO2: 97% (11 Oct 2023 05:00) (90% - 100%)    Parameters below as of 11 Oct 2023 05:00  Patient On (Oxygen Delivery Method): room air                  LABS:                        10.3   19.60 )-----------( 395      ( 10 Oct 2023 11:15 )             33.5     10-10    136  |  96  |  30<H>  ----------------------------<  323<H>  3.5   |  25  |  8.20<H>    Ca    9.3      10 Oct 2023 11:15    TPro  8.7<H>  /  Alb  2.4<L>  /  TBili  0.7  /  DBili  x   /  AST  27  /  ALT  27  /  AlkPhos  155<H>  10-10    PT/INR - ( 10 Oct 2023 11:15 )   PT: 15.0 sec;   INR: 1.29 ratio         PTT - ( 10 Oct 2023 11:15 )  PTT:29.2 sec  Urinalysis Basic - ( 10 Oct 2023 11:15 )    Color: x / Appearance: x / SG: x / pH: x  Gluc: 323 mg/dL / Ketone: x  / Bili: x / Urobili: x   Blood: x / Protein: x / Nitrite: x   Leuk Esterase: x / RBC: x / WBC x   Sq Epi: x / Non Sq Epi: x / Bacteria: x            WBC:  WBC Count: 19.60 K/uL (10-10 @ 11:15)      MICROBIOLOGY:  RECENT CULTURES:              PT/INR - ( 10 Oct 2023 11:15 )   PT: 15.0 sec;   INR: 1.29 ratio         PTT - ( 10 Oct 2023 11:15 )  PTT:29.2 sec    Sodium:  Sodium: 136 mmol/L (10-10 @ 11:15)      8.20 mg/dL 10-10 @ 11:15      Hemoglobin:  Hemoglobin: 10.3 g/dL (10-10 @ 11:15)      Platelets: Platelet Count - Automated: 395 K/uL (10-10 @ 11:15)      LIVER FUNCTIONS - ( 10 Oct 2023 11:15 )  Alb: 2.4 g/dL / Pro: 8.7 g/dL / ALK PHOS: 155 U/L / ALT: 27 U/L / AST: 27 U/L / GGT: x             Urinalysis Basic - ( 10 Oct 2023 11:15 )    Color: x / Appearance: x / SG: x / pH: x  Gluc: 323 mg/dL / Ketone: x  / Bili: x / Urobili: x   Blood: x / Protein: x / Nitrite: x   Leuk Esterase: x / RBC: x / WBC x   Sq Epi: x / Non Sq Epi: x / Bacteria: x        RADIOLOGY & ADDITIONAL STUDIES:      MICROBIOLOGY:  RECENT CULTURES:

## 2023-10-11 NOTE — CONSULT NOTE ADULT - SUBJECTIVE AND OBJECTIVE BOX
VASCULAR SURGERY CONSULT NOTE    was consulted on 48y old  Male who presents with a chief complaint of weakness to evaluate LLE with radiographic evidence of gas ?  necrotizing fascitis       HPI:  47 yo malewith PMH of DM2, b/l BKA, ESRD (on HD MWF) ,infection  right third finger 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 Sin/Dr. Singh) Presents to ED [facility] 10/10 with weakness for 2 weeks. 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 Wound care consult and ID consults are requested     < from: CT Lower Extremity No Cont, Left (10.10.23 @ 19:30) >  CLINICAL INFORMATION: Infected left posterior thigh wound    COMPARISON: None available.    TECHNIQUE: Axial CT images were obtained of the left lower extremity from   the hip through the knee with coronal and sagittal reconstructions. No   intravenous contrast was administered.    FINDINGS:    Osseous: Status post remote below-the-knee amputation. No acute fracture   or dislocation. No definite focal cortical erosion or aggressive   periosteal reaction. Mild to moderate left hip arthrosis without   significant effusion. Moderate patellofemoral compartment predominant   left knee arthrosis with small volume effusion.    Soft tissues: Deep soft tissue ulceration along the posterior medial   margin of the knee at the level of the femoral condyles. Surrounding   intramuscular and perifascial soft tissue emphysema tracking cranially   along the myotendinous junction of the semitendinosus up to the level of   the distal femoral diaphysis and caudally into the pes anserinus bursa.   Inflammatory stranding most prominent in and about the semimembranosus   bursa, but without sizable hyperattenuating hematoma or drainable   encapsulated fluid collection on this non-IV contrast enhanced   examination. Advanced atherosclerosis. Prominent nonpathologically   enlarged reactive lymph nodes.    IMPRESSION:    Status post remote left below-the-knee amputation. Deep soft tissue   ulceration along the posterior medial margin of the knee with surrounding   soft tissue intramuscular and perifascial soft tissue emphysema tracking   cranially along the semitendinosus up to the level of the distal femoral   shaft and caudally into the pes anserinus bursa. Differential includes   necrotizing fasciitis in the proper clinical setting. No drainable mature   abscess on this non-IV contrast enhanced examination. Recommend surgical   consultation. Findings and recommendation discussed with attending Dr. Olivas via telephone at approximately 9:55 AM on 10/11/2023 by Dr. Hunter.              PAST MEDICAL & SURGICAL HISTORY:  ESRD on dialysis      H/O hypotension      Diabetes mellitus      Leg wound, left      Steal syndrome dialysis vascular access      Gangrene of finger of right hand      PVD (peripheral vascular disease)      Anemia of chronic disease      Constipation      HLD (hyperlipidemia)      S/P BKA (below knee amputation) bilateral      AV fistula          Review of Systems:    I have reviewed 9 systems with the patient and the only positive findings were as above     MEDICATIONS  (STANDING):  atorvastatin 40 milliGRAM(s) Oral at bedtime  collagenase Ointment 1 Application(s) Topical daily  dextrose 5%. 1000 milliLiter(s) (100 mL/Hr) IV Continuous <Continuous>  dextrose 5%. 1000 milliLiter(s) (50 mL/Hr) IV Continuous <Continuous>  dextrose 50% Injectable 25 Gram(s) IV Push once  dextrose 50% Injectable 25 Gram(s) IV Push once  dextrose 50% Injectable 12.5 Gram(s) IV Push once  dorzolamide 2%/timolol 0.5% Ophthalmic Solution 1 Drop(s) Both EYES two times a day  glucagon  Injectable 1 milliGRAM(s) IntraMuscular once  heparin   Injectable 5000 Unit(s) SubCutaneous every 12 hours  insulin lispro (ADMELOG) corrective regimen sliding scale   SubCutaneous every 6 hours  lactobacillus acidophilus 1 Tablet(s) Oral every 12 hours  midodrine. 10 milliGRAM(s) Oral three times a day  pantoprazole    Tablet 40 milliGRAM(s) Oral before breakfast  piperacillin/tazobactam IVPB.. 3.375 Gram(s) IV Intermittent every 12 hours  senna 2 Tablet(s) Oral at bedtime  vancomycin  IVPB 1000 milliGRAM(s) IV Intermittent once    MEDICATIONS  (PRN):  acetaminophen     Tablet .. 650 milliGRAM(s) Oral every 4 hours PRN Temp greater or equal to 38C (100.4F), Mild Pain (1 - 3)  aluminum hydroxide/magnesium hydroxide/simethicone Suspension 30 milliLiter(s) Oral every 4 hours PRN Dyspepsia  dextrose Oral Gel 15 Gram(s) Oral once PRN Blood Glucose LESS THAN 70 milliGRAM(s)/deciliter  melatonin 3 milliGRAM(s) Oral at bedtime PRN Insomnia  ondansetron Injectable 4 milliGRAM(s) IV Push every 8 hours PRN Nausea and/or Vomiting      Allergies    No Known Drug Allergies  [place] (Rash)    Intolerances        SOCIAL HISTORY          Smoking: Yes [ ]  No [ ]   ______pk yrs          ETOH  Yes [ ]  No [ ]  Social [ ]          DRUGS:  Yes [ ]  No [ ]  if so what______________    FAMILY HISTORY:      Vital Signs Last 24 Hrs  T(C): 37 (11 Oct 2023 11:40), Max: 38.5 (11 Oct 2023 05:00)  T(F): 98.6 (11 Oct 2023 11:40), Max: 101.3 (11 Oct 2023 05:00)  HR: 75 (11 Oct 2023 11:40) (75 - 138)  BP: 105/60 (11 Oct 2023 06:21) (74/49 - 110/59)  BP(mean): --  RR: 16 (11 Oct 2023 11:40) (16 - 20)  SpO2: 99% (11 Oct 2023 11:40) (97% - 100%)    Parameters below as of 11 Oct 2023 11:40  Patient On (Oxygen Delivery Method): room air        Physical Exam:    General:  Appears in no distress  Eyes : LYUBOV  HENT:  WNL, no JVD  Chest:  clear breath sounds good inspiratory effort   Cardiovascular:  Regular rate & rhythm  Abdomen: soft , NT ND , +ve bs   Extremities: bilateral BKA, LLE with posterior circular wound a size of a dime , puss draining, able to establish a  track from the wound to the distal amputation site, no physical track was appreciated proximally,  no pain reported during the evaluation, dermis otherwise intact. no crepitus appreciated    Neuro/Psych:  Alert, oriented tp time, place and person       LABS:                        9.2    17.79 )-----------( 386      ( 11 Oct 2023 06:50 )             28.4     10-11    135  |  100  |  37<H>  ----------------------------<  193<H>  3.6   |  23  |  9.10<H>    Ca    9.1      11 Oct 2023 10:17    TPro  7.3  /  Alb  2.1<L>  /  TBili  0.7  /  DBili  x   /  AST  13<L>  /  ALT  19  /  AlkPhos  125<H>  10-11    PT/INR - ( 11 Oct 2023 06:50 )   PT: 15.3 sec;   INR: 1.32 ratio         PTT - ( 10 Oct 2023 11:15 )  PTT:29.2 sec  Urinalysis Basic - ( 11 Oct 2023 10:17 )    Color: x / Appearance: x / SG: x / pH: x  Gluc: 193 mg/dL / Ketone: x  / Bili: x / Urobili: x   Blood: x / Protein: x / Nitrite: x   Leuk Esterase: x / RBC: x / WBC x   Sq Epi: x / Non Sq Epi: x / Bacteria: x

## 2023-10-11 NOTE — PROGRESS NOTE ADULT - ASSESSMENT
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 Sin/Dr. Singh) Presents to ED [place] 10/10 with weakness for 2 weeks    anemia  esrd  weakness  DM  BKA  PAD  PVD  Pain    fever overnight  ID following  on ZOSYN  vs noted    full code  lives with family at home - in [facility] as per renal  pain relief  oral hygiene  skin care  wound care  assist with needs

## 2023-10-11 NOTE — DIETITIAN INITIAL EVALUATION ADULT - PERTINENT MEDS FT
MEDICATIONS  (STANDING):  atorvastatin 40 milliGRAM(s) Oral at bedtime  collagenase Ointment 1 Application(s) Topical daily  dextrose 5%. 1000 milliLiter(s) (50 mL/Hr) IV Continuous <Continuous>  dextrose 5%. 1000 milliLiter(s) (100 mL/Hr) IV Continuous <Continuous>  dextrose 50% Injectable 25 Gram(s) IV Push once  dextrose 50% Injectable 12.5 Gram(s) IV Push once  dextrose 50% Injectable 25 Gram(s) IV Push once  dorzolamide 2%/timolol 0.5% Ophthalmic Solution 1 Drop(s) Both EYES two times a day  glucagon  Injectable 1 milliGRAM(s) IntraMuscular once  heparin   Injectable 5000 Unit(s) SubCutaneous every 12 hours  insulin lispro (ADMELOG) corrective regimen sliding scale   SubCutaneous every 6 hours  lactobacillus acidophilus 1 Tablet(s) Oral every 12 hours  midodrine. 10 milliGRAM(s) Oral three times a day  pantoprazole    Tablet 40 milliGRAM(s) Oral before breakfast  piperacillin/tazobactam IVPB.. 3.375 Gram(s) IV Intermittent every 12 hours  senna 2 Tablet(s) Oral at bedtime    MEDICATIONS  (PRN):  acetaminophen     Tablet .. 650 milliGRAM(s) Oral every 4 hours PRN Temp greater or equal to 38C (100.4F), Mild Pain (1 - 3)  aluminum hydroxide/magnesium hydroxide/simethicone Suspension 30 milliLiter(s) Oral every 4 hours PRN Dyspepsia  dextrose Oral Gel 15 Gram(s) Oral once PRN Blood Glucose LESS THAN 70 milliGRAM(s)/deciliter  melatonin 3 milliGRAM(s) Oral at bedtime PRN Insomnia  ondansetron Injectable 4 milliGRAM(s) IV Push every 8 hours PRN Nausea and/or Vomiting

## 2023-10-11 NOTE — PROGRESS NOTE ADULT - ASSESSMENT
REASON FOR VISIT  .. Management of problems listed below      ROS  . Patient unable to give ROS     PHYSICAL EXAM    HEENT Unremarkable  atraumatic   RESP Fair air entry  Harsh breath sound   CARDIAC S1 S2 No S3     NO JVD    ABDOMEN No hepatosplenomegaly   BL BKA  NO rash       GENERAL DATA .     GOC.  .. 10/10/2023 full code  ICU STAY. .. none  COVID. ..  scv2 10/10/2023 (-)  BEST PRACTICE ISSUES.    HOB ELEVATN. .. Yes  DVT PPLX. ..  10/10/2023 [facility]    SPEECH SWALLOW RECOMMENDATIONS.    ..        DIET.  ..  10/10/2023 npo   IV fl ...   BOLUS.   .. 10/11/2023 4a ns 500   .. 10/10/2023 4p ns 250   .. 10/10/2023 12p ns 500  .. 10/10/2023 9a ns 1000   STRESS ULCER PPLX. ..    10/10/2023 protonix 40  INFECTION PPLX. ..     ALLGY. ..   turkey                      WT. ..  10/10/2023 59    ABGS.   .     VS/ PO/IO/ VENT/ DRIPS.   10/11/2023 101 79 100/60   10/11/2023 ra 97%     DOA C/C.   10/10/2023 Increasing weakness 2 weeks fevermalaise     INITIAL PRESENTATION.   . 10/10/2023 49yo male with weakness for 2 weeks. pt is dialysis patient M/W/F last dialysis was yesterday but pt has been feeling weak for the last 2 weeks, no cough, fever, chills, no vomiting or diarrhea, pt also has had an open wound under his left thigh that has been neglected for the last few weeks, draining pus and with redness  . Pulm critical care consulted as BP low     PMH.   . DM  . ESRD  . HD   . AV fistula  . BL BKA     HOME MEDS.   COURSE.     PROBLEMS/ASSESSMENT/RECOMMENDATIONS (A/R).    . Vanco level   .. 10/11/2023 vanco 15.6    INFECTION.  . Skin soft tissue infection   .. W 10/10-10/11/2023 w 19 - 17.7  .. ct lle 10/10   .... sp remote bka   .... no cortical eroison or aggressive periosteal reaction   .... deep st ulceration along post medial margin knee with surrounding st intramuscular and perifascial st emohysema tracking cranially dd necrotizing fascitis    .. Flu ab 10/10/2023 (-)  .. rsv 10/10/2023 (-)  .. 10/10 9a vanco 1g givn   .. 10/10/2023 zosyn     CARDIAC.  .. La 10/10/2023 la 1.1   .. Monitor     .. Shock  .. 10/10/2023 5:30 PM Has been given 2l fluid challenge  .. 10/10/2023 5:30 PM ra po 99%  .. 10/10/2023 will cautiously try more fluids  .. 10/10/2023 If continues to have map below 65 will need icu for iv vasopressors     HEMAT.  .. Hb 10/10-10/11/2023 Hb 10.3 - 9.2   .. Inr 10/10/2023 inr 129   .. Monitor     RENAL.  . ESRD  .. Na 10/10/2023 Na 136  .. Cr 10/10/2023 Cr 8.2   .. HD as guided by renal     . NECROTISZING FASCITIS   .. ct lle 10/10   .... sp remote bka   .... no cortical eroison or aggressive periosteal reaction   .... deep st ulceration along post medial margin knee with surrounding st intramuscular and perifascial st emohysema tracking cranially dd necrotizing fascitis    .. 10/11/2023 10:30 AM Surgery called as soon as radiologist reported NF   .. Pt is cleared for urgent surgery from Pulm standpooint     MAIN ISSUES   . SHOCK   . SEPSIS   . SKIN SOFT TISSUE CELLULITIS  10/10 l post knee pressure ulcer   . NECROITIZING FASCITIS 10/10 L knee ct   . ESRD     OVERALL PLAN  . SSTI 10/10 started zosyn 10/10 given vanco 1g   . NECROTIZING FASCITIS 10/11/2023 10:28 AM surg consultd as soon as radio reported  . SHOCK If needs iv vasopressors tr to ICU    TIME SPENT.   . Over 36 minutes aggregate care time spent on encounter; activities included   direct patient care, counseling and/or coordinating care reviewing notes, lab data/ imaging , discussion with multidisciplinary team/ patient  /family and explaining in detail risks, benefits, alternatives  of the recommendations     MICHAEL TIRADO 48 m 10/10/2023 [address] DR ELENA SCHMUTER DR MOHSEN PAHLAVAN

## 2023-10-11 NOTE — CARE COORDINATION ASSESSMENT. - NSPASTMEDSURGHISTORY_GEN_ALL_CORE_FT
PAST MEDICAL & SURGICAL HISTORY:  Diabetes mellitus      H/O hypotension      ESRD on dialysis      AV fistula      S/P BKA (below knee amputation) bilateral      HLD (hyperlipidemia)      Constipation      Anemia of chronic disease      PVD (peripheral vascular disease)      Gangrene of finger of right hand      Steal syndrome dialysis vascular access      Leg wound, left

## 2023-10-11 NOTE — PROGRESS NOTE ADULT - SUBJECTIVE AND OBJECTIVE BOX
Date/Time Patient Seen:  		  Referring MD:   Data Reviewed	       Patient is a 48y old  Male who presents with a chief complaint of weakness (10 Oct 2023 23:42)      Subjective/HPI     PAST MEDICAL & SURGICAL HISTORY:  ESRD on dialysis    H/O hypotension    Diabetes mellitus    Leg wound, left    Steal syndrome dialysis vascular access    Gangrene of finger of right hand    PVD (peripheral vascular disease)    Anemia of chronic disease    Constipation    HLD (hyperlipidemia)    S/P BKA (below knee amputation) bilateral    AV fistula          Medication list         MEDICATIONS  (STANDING):  atorvastatin 40 milliGRAM(s) Oral at bedtime  collagenase Ointment 1 Application(s) Topical daily  dextrose 5%. 1000 milliLiter(s) (50 mL/Hr) IV Continuous <Continuous>  dextrose 5%. 1000 milliLiter(s) (100 mL/Hr) IV Continuous <Continuous>  dextrose 50% Injectable 25 Gram(s) IV Push once  dextrose 50% Injectable 12.5 Gram(s) IV Push once  dextrose 50% Injectable 25 Gram(s) IV Push once  dorzolamide 2%/timolol 0.5% Ophthalmic Solution 1 Drop(s) Both EYES two times a day  glucagon  Injectable 1 milliGRAM(s) IntraMuscular once  heparin   Injectable 5000 Unit(s) SubCutaneous every 12 hours  insulin lispro (ADMELOG) corrective regimen sliding scale   SubCutaneous every 6 hours  lactobacillus acidophilus 1 Tablet(s) Oral every 12 hours  midodrine. 10 milliGRAM(s) Oral three times a day  pantoprazole    Tablet 40 milliGRAM(s) Oral before breakfast  piperacillin/tazobactam IVPB.. 3.375 Gram(s) IV Intermittent every 12 hours  senna 2 Tablet(s) Oral at bedtime    MEDICATIONS  (PRN):  acetaminophen     Tablet .. 650 milliGRAM(s) Oral every 6 hours PRN Temp greater or equal to 38C (100.4F), Mild Pain (1 - 3)  aluminum hydroxide/magnesium hydroxide/simethicone Suspension 30 milliLiter(s) Oral every 4 hours PRN Dyspepsia  dextrose Oral Gel 15 Gram(s) Oral once PRN Blood Glucose LESS THAN 70 milliGRAM(s)/deciliter  melatonin 3 milliGRAM(s) Oral at bedtime PRN Insomnia  ondansetron Injectable 4 milliGRAM(s) IV Push every 8 hours PRN Nausea and/or Vomiting         Vitals log        ICU Vital Signs Last 24 Hrs  T(C): 38.3 (10 Oct 2023 22:18), Max: 38.3 (10 Oct 2023 22:18)  T(F): 100.9 (10 Oct 2023 22:18), Max: 100.9 (10 Oct 2023 22:18)  HR: 79 (10 Oct 2023 22:18) (75 - 138)  BP: 90/40 (10 Oct 2023 22:18) (66/41 - 110/59)  BP(mean): --  ABP: --  ABP(mean): --  RR: 19 (10 Oct 2023 22:18) (17 - 20)  SpO2: 99% (10 Oct 2023 22:18) (90% - 100%)    O2 Parameters below as of 10 Oct 2023 22:18  Patient On (Oxygen Delivery Method): room air                 Input and Output:  I&O's Detail      Lab Data                        10.3   19.60 )-----------( 395      ( 10 Oct 2023 11:15 )             33.5     10-10    136  |  96  |  30<H>  ----------------------------<  323<H>  3.5   |  25  |  8.20<H>    Ca    9.3      10 Oct 2023 11:15    TPro  8.7<H>  /  Alb  2.4<L>  /  TBili  0.7  /  DBili  x   /  AST  27  /  ALT  27  /  AlkPhos  155<H>  10-10            Review of Systems	      Objective     Physical Examination    heart s1s2  lung dec BS      Pertinent Lab findings & Imaging      Barak:  NO   Adequate UO     I&O's Detail           Discussed with:     Cultures:	        Radiology

## 2023-10-11 NOTE — CHART NOTE - NSCHARTNOTEFT_GEN_A_CORE
: [place]    INDICATION: shock    PROCEDURE:  [x] LIMITED ECHO  [x] LIMITED CHEST  [ ] LIMITED RETROPERITONEAL  [ ] LIMITED ABDOMINAL  [ ] LIMITED DVT  [ ] NEEDLE GUIDANCE VASCULAR  [ ] NEEDLE GUIDANCE THORACENTESIS  [ ] NEEDLE GUIDANCE PARACENTESIS  [ ] NEEDLE GUIDANCE PERICARDIOCENTESIS  [ ] OTHER    FINDINGS:  - grossly preserved LV size and function  - no RV dilation  - IVC fully collapsing with respiration   - A-line predominance b/l  - no pleural effusions or consolidations    INTERPRETATION:  - hypotension may be volume responsive - will give albumin 250cc x1

## 2023-10-11 NOTE — PROGRESS NOTE ADULT - SUBJECTIVE AND OBJECTIVE BOX
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 Sin/Dr. Singh) Presents to ED [facility] 10/10 with weakness for 2 weeks. 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 Wound care consult and ID consults are requested . (10 Oct 2023 12:52)        SUBJECTIVE:      ALLERGIES:  Allergies    No Known Drug Allergies  Tur[place] (Rash)    Intolerances          MEDICATIONS  (STANDING):  atorvastatin 40 milliGRAM(s) Oral at bedtime  dextrose 5%. 1000 milliLiter(s) (100 mL/Hr) IV Continuous <Continuous>  dextrose 5%. 1000 milliLiter(s) (50 mL/Hr) IV Continuous <Continuous>  dextrose 50% Injectable 25 Gram(s) IV Push once  dextrose 50% Injectable 25 Gram(s) IV Push once  dextrose 50% Injectable 12.5 Gram(s) IV Push once  dorzolamide 2%/timolol 0.5% Ophthalmic Solution 1 Drop(s) Both EYES two times a day  glucagon  Injectable 1 milliGRAM(s) IntraMuscular once  heparin   Injectable 5000 Unit(s) SubCutaneous every 12 hours  insulin glargine Injectable (LANTUS) 5 Unit(s) SubCutaneous at bedtime  insulin lispro (ADMELOG) corrective regimen sliding scale   SubCutaneous Before meals and at bedtime  insulin lispro Injectable (ADMELOG) 3 Unit(s) SubCutaneous three times a day before meals  lactobacillus acidophilus 1 Tablet(s) Oral every 12 hours  midodrine. 10 milliGRAM(s) Oral three times a day  norepinephrine Infusion 0.05 MICROgram(s)/kG/Min (6 mL/Hr) IV Continuous <Continuous>  pantoprazole    Tablet 40 milliGRAM(s) Oral before breakfast  piperacillin/tazobactam IVPB.. 3.375 Gram(s) IV Intermittent every 12 hours  polyethylene glycol 3350 17 Gram(s) Oral daily  senna 2 Tablet(s) Oral at bedtime  vancomycin  IVPB 750 milliGRAM(s) IV Intermittent once    MEDICATIONS  (PRN):  acetaminophen     Tablet .. 650 milliGRAM(s) Oral every 4 hours PRN Temp greater or equal to 38C (100.4F), Mild Pain (1 - 3)  aluminum hydroxide/magnesium hydroxide/simethicone Suspension 30 milliLiter(s) Oral every 4 hours PRN Dyspepsia  melatonin 3 milliGRAM(s) Oral at bedtime PRN Insomnia  ondansetron Injectable 4 milliGRAM(s) IV Push every 8 hours PRN Nausea and/or Vomiting  oxyCODONE    IR 5 milliGRAM(s) Oral every 6 hours PRN Moderate Pain (4 - 6)      REVIEW OF SYSTEMS:  CONSTITUTIONAL: No fever,  RESPIRATORY: No cough, wheezing, shortness of breath  CARDIOVASCULAR: No chest pain, dyspnea, palpitations, dizziness, syncope, paroxysmal nocturnal dyspnea, orthopnea, or arm or leg swelling  GASTROINTESTINAL: No abdominal  or epigastric pain, nausea, vomiting,  diarrhea  NEUROLOGICAL: No headaches,  loss of strength, numbness, or tremors    Vital Signs Last 24 Hrs  T(C): 36.6 (12 Oct 2023 09:30), Max: 37.9 (11 Oct 2023 15:10)  T(F): 97.9 (12 Oct 2023 09:30), Max: 100.2 (11 Oct 2023 15:10)  HR: 63 (12 Oct 2023 11:30) (59 - 81)  BP: 125/59 (12 Oct 2023 11:30) (67/41 - 193/90)  BP(mean): 86 (12 Oct 2023 11:30) (50 - 129)  RR: 19 (12 Oct 2023 11:30) (6 - 41)  SpO2: 100% (12 Oct 2023 11:30) (94% - 100%)    Parameters below as of 12 Oct 2023 09:30  Patient On (Oxygen Delivery Method): room air        PHYSICAL EXAM:  HEAD:  Atraumatic, Normocephalic  NECK: Supple and normal thyroid.  No JVD or carotid bruit.   HEART: S1, S2 regular , 1/6 soft ejection systolic murmur in mitral area , no thrill and no gallops .  PULMONARY: Bilateral vesicular breathing , few scattered ronchi and few scattered rales are present .  ABDOMEN: Soft nontender and positive bowl sounds   EXTREMITIES:  No clubbing, cyanosis, or pedal  edema  NEUROLOGICAL: AAOX3 , no focal deficit .    LABS:                        10.0   22.10 )-----------( 427      ( 12 Oct 2023 06:33 )             31.2     10-12    134<L>  |  99  |  46<H>  ----------------------------<  313<H>  4.1   |  19<L>  |  10.00<H>    Ca    8.5      12 Oct 2023 06:33  Phos  6.6     10-12  Mg     2.4     10-12    TPro  6.7  /  Alb  2.0<L>  /  TBili  0.7  /  DBili  x   /  AST  7<L>  /  ALT  15  /  AlkPhos  113  10-12        PT/INR - ( 11 Oct 2023 06:50 )   PT: 15.3 sec;   INR: 1.32 ratio           Urinalysis Basic - ( 12 Oct 2023 06:33 )    Color: x / Appearance: x / SG: x / pH: x  Gluc: 313 mg/dL / Ketone: x  / Bili: x / Urobili: x   Blood: x / Protein: x / Nitrite: x   Leuk Esterase: x / RBC: x / WBC x   Sq Epi: x / Non Sq Epi: x / Bacteria: x      BNP      EKG:  ECHO:  IMAGING:    Assessment/Plan    Will continue to follow during hospital course and give further recommendations as needed . Thanks for your referral . if any questions please contact me at office ([phone] [phone])  OSMAN ALICIA MD [address] [address]  [address]  OFFICE : [phone]  CARDIOLOGY F/U :       HPI:  49 yo malewith PMH of DM2, b/l BKA, ESRD (on HD MWF) ,infection  right third finger 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 Sin/Dr. Singh) Presents to ED [facility] 10/10 with weakness for 2 weeks. 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 Wound care consult and ID consults are requested . (10 Oct 2023 12:52)        SUBJECTIVE: Patient feeling better .      ALLERGIES:  Allergies    No Known Drug Allergies  [place] (Rash)    Intolerances          MEDICATIONS  (STANDING):  atorvastatin 40 milliGRAM(s) Oral at bedtime  dextrose 5%. 1000 milliLiter(s) (100 mL/Hr) IV Continuous <Continuous>  dextrose 5%. 1000 milliLiter(s) (50 mL/Hr) IV Continuous <Continuous>  dextrose 50% Injectable 25 Gram(s) IV Push once  dextrose 50% Injectable 25 Gram(s) IV Push once  dextrose 50% Injectable 12.5 Gram(s) IV Push once  dorzolamide 2%/timolol 0.5% Ophthalmic Solution 1 Drop(s) Both EYES two times a day  glucagon  Injectable 1 milliGRAM(s) IntraMuscular once  heparin   Injectable 5000 Unit(s) SubCutaneous every 12 hours  insulin glargine Injectable (LANTUS) 5 Unit(s) SubCutaneous at bedtime  insulin lispro (ADMELOG) corrective regimen sliding scale   SubCutaneous Before meals and at bedtime  insulin lispro Injectable (ADMELOG) 3 Unit(s) SubCutaneous three times a day before meals  lactobacillus acidophilus 1 Tablet(s) Oral every 12 hours  midodrine. 10 milliGRAM(s) Oral three times a day  norepinephrine Infusion 0.05 MICROgram(s)/kG/Min (6 mL/Hr) IV Continuous <Continuous>  pantoprazole    Tablet 40 milliGRAM(s) Oral before breakfast  piperacillin/tazobactam IVPB.. 3.375 Gram(s) IV Intermittent every 12 hours  polyethylene glycol 3350 17 Gram(s) Oral daily  senna 2 Tablet(s) Oral at bedtime  vancomycin  IVPB 750 milliGRAM(s) IV Intermittent once    MEDICATIONS  (PRN):  acetaminophen     Tablet .. 650 milliGRAM(s) Oral every 4 hours PRN Temp greater or equal to 38C (100.4F), Mild Pain (1 - 3)  aluminum hydroxide/magnesium hydroxide/simethicone Suspension 30 milliLiter(s) Oral every 4 hours PRN Dyspepsia  melatonin 3 milliGRAM(s) Oral at bedtime PRN Insomnia  ondansetron Injectable 4 milliGRAM(s) IV Push every 8 hours PRN Nausea and/or Vomiting  oxyCODONE    IR 5 milliGRAM(s) Oral every 6 hours PRN Moderate Pain (4 - 6)      REVIEW OF SYSTEMS:  CONSTITUTIONAL: No fever,  RESPIRATORY: No cough, wheezing, shortness of breath  CARDIOVASCULAR: No chest pain, dyspnea, palpitations, dizziness, syncope, paroxysmal nocturnal dyspnea, orthopnea, or arm or leg swelling  GASTROINTESTINAL: No abdominal  or epigastric pain, nausea, vomiting,  diarrhea  NEUROLOGICAL: No headaches,  numbness, or tremors  Skin : No itching .  Hematology : No active bleeding .  Endocrinology : No heat and cold intolerance   Psychiatry : Patient is calm .  Genitourinary : No dysuria .  Musculoskeletal : patient has mild arthritis .    Vital Signs Last 24 Hrs  T(C): 36.6 (12 Oct 2023 09:30), Max: 37.9 (11 Oct 2023 15:10)  T(F): 97.9 (12 Oct 2023 09:30), Max: 100.2 (11 Oct 2023 15:10)  HR: 63 (12 Oct 2023 11:30) (59 - 81)  BP: 125/59 (12 Oct 2023 11:30) (67/41 - 193/90)  BP(mean): 86 (12 Oct 2023 11:30) (50 - 129)  RR: 19 (12 Oct 2023 11:30) (6 - 41)  SpO2: 100% (12 Oct 2023 11:30) (94% - 100%)    Parameters below as of 12 Oct 2023 09:30  Patient On (Oxygen Delivery Method): room air        PHYSICAL EXAM:  HEAD:  Atraumatic, Normocephalic  NECK: Supple and normal thyroid.  No JVD or carotid bruit.   HEART: S1, S2 regular , 1/6 soft ejection systolic murmur in mitral area , no thrill and no gallops .  PULMONARY: Bilateral vesicular breathing , few scattered rhonchi and few scattered rales are present .  ABDOMEN: Soft nontender and positive bowl sounds   EXTREMITIES:  No clubbing, cyanosis, and has bilateral knee amputations and has drainage wound on left posterior thigh and stump .  NEUROLOGICAL: AAOX3 , no focal deficit .  Skin : No rashes .  Musculoskeletal : No joint swellings     LABS:                        10.0   22.10 )-----------( 427      ( 12 Oct 2023 06:33 )             31.2     10-12    134<L>  |  99  |  46<H>  ----------------------------<  313<H>  4.1   |  19<L>  |  10.00<H>    Ca    8.5      12 Oct 2023 06:33  Phos  6.6     10-12  Mg     2.4     10-12    TPro  6.7  /  Alb  2.0<L>  /  TBili  0.7  /  DBili  x   /  AST  7<L>  /  ALT  15  /  AlkPhos  113  10-12        PT/INR - ( 11 Oct 2023 06:50 )   PT: 15.3 sec;   INR: 1.32 ratio           Urinalysis Basic - ( 12 Oct 2023 06:33 )    Color: x / Appearance: x / SG: x / pH: x  Gluc: 313 mg/dL / Ketone: x  / Bili: x / Urobili: x   Blood: x / Protein: x / Nitrite: x   Leuk Esterase: x / RBC: x / WBC x   Sq Epi: x / Non Sq Epi: x / Bacteria: x      Assessment/Plan  Patient has :  1) ESRD and on hemodialysis .  2) H/P BKA .  3) Pus  Draining  wound on posterior left thigh and left stump . Necrotizing Fascitis .  4) DM   5) Mild anemia   Plan : 1) Patient cardiac wise stable and cleared for left lower extremity surgery for the wound . Benefits of surgery outweigh the risks 2) I/V antibiotics as per ID 3) Monitor hemoglobin and electrolytes 4) Rest of medications as such .  Will continue to follow during hospital course and give further recommendations as needed . Thanks for your referral . if any questions please contact me at office ([phone] cell [phone])

## 2023-10-11 NOTE — PROGRESS NOTE ADULT - SUBJECTIVE AND OBJECTIVE BOX
Patient is a 48y Male whom presented to the hospital with esrd on hd     PAST MEDICAL & SURGICAL HISTORY:  ESRD on dialysis      H/O hypotension      Diabetes mellitus      Leg wound, left      Steal syndrome dialysis vascular access      Gangrene of finger of right hand      PVD (peripheral vascular disease)      Anemia of chronic disease      Constipation      HLD (hyperlipidemia)      S/P BKA (below knee amputation) bilateral      AV fistula          MEDICATIONS  (STANDING):  atorvastatin 40 milliGRAM(s) Oral at bedtime  collagenase Ointment 1 Application(s) Topical daily  dextrose 5%. 1000 milliLiter(s) (50 mL/Hr) IV Continuous <Continuous>  dextrose 5%. 1000 milliLiter(s) (100 mL/Hr) IV Continuous <Continuous>  dextrose 50% Injectable 25 Gram(s) IV Push once  dextrose 50% Injectable 25 Gram(s) IV Push once  dextrose 50% Injectable 12.5 Gram(s) IV Push once  dorzolamide 2%/timolol 0.5% Ophthalmic Solution 1 Drop(s) Both EYES two times a day  glucagon  Injectable 1 milliGRAM(s) IntraMuscular once  heparin   Injectable 5000 Unit(s) SubCutaneous every 12 hours  insulin lispro (ADMELOG) corrective regimen sliding scale   SubCutaneous every 6 hours  lactobacillus acidophilus 1 Tablet(s) Oral every 12 hours  midodrine. 10 milliGRAM(s) Oral three times a day  pantoprazole    Tablet 40 milliGRAM(s) Oral before breakfast  piperacillin/tazobactam IVPB.. 3.375 Gram(s) IV Intermittent every 12 hours  senna 2 Tablet(s) Oral at bedtime      Allergies    No Known Drug Allergies  [place] (Rash)    Intolerances        SOCIAL HISTORY:  Denies ETOh,Smoking,     FAMILY HISTORY:      REVIEW OF SYSTEMS:    CONSTITUTIONAL: No weakness, fevers or chills  EYES/ENT: No visual changes;  no throat pain   NECK: No pain or stiffness  RESPIRATORY: No cough, wheezing, hemoptysis; No shortness of breath  CARDIOVASCULAR: No chest pain or palpitations  GASTROINTESTINAL: No abdominal or epigastric pain. No nausea, vomiting,     No diarrhea or constipation. No melena   GENITOURINARY: No dysuria, frequency or hematuria  NEUROLOGICAL: No numbness or weakness  SKIN: dry      VITAL:  T(C): , Max: 37.7 (10-10-23 @ 09:38)  T(F): , Max: 99.9 (10-10-23 @ 09:38)  HR: 75 (10-10-23 @ 19:50)  BP: 105/56 (10-10-23 @ 19:50)  BP(mean): --  RR: 18 (10-10-23 @ 19:50)  SpO2: 99% (10-10-23 @ 19:50)  Wt(kg): --    I and O's:      Weight (kg): 59 (10-10 @ 09:17)    PHYSICAL EXAM:    Constitutional: NAD  HEENT: conjunctive   clear   Neck:  No JVD  Respiratory: CTAB  Cardiovascular: S1 and S2  Gastrointestinal: BS+, soft, NT/ND  Extremities: No peripheral edema  Neurological: A/O x 3, no focal deficits  Psychiatric: Normal mood, normal affect  : No [place]  Skin: No rashes   S/P BKA (below knee amputation) bilateral  AV fistula  LABS:                        10.3   19.60 )-----------( 395      ( 10 Oct 2023 11:15 )             33.5     10-10    136  |  96  |  30<H>  ----------------------------<  323<H>  3.5   |  25  |  8.20<H>    Ca    9.3      10 Oct 2023 11:15    TPro  8.7<H>  /  Alb  2.4<L>  /  TBili  0.7  /  DBili  x   /  AST  27  /  ALT  27  /  AlkPhos  155<H>  10-10      Urine Studies:  Urinalysis Basic - ( 10 Oct 2023 11:15 )    Color: x / Appearance: x / SG: x / pH: x  Gluc: 323 mg/dL / Ketone: x  / Bili: x / Urobili: x   Blood: x / Protein: x / Nitrite: x   Leuk Esterase: x / RBC: x / WBC x   Sq Epi: x / Non Sq Epi: x / Bacteria: x            RADIOLOGY & ADDITIONAL STUDIES:        Home Medications:  Admelog 100 units/mL injectable solution: 4 unit(s) subcutaneously 3 times a day (10 Oct 2023 14:32)  amLODIPine 5 mg oral tablet: 1 tab(s) orally once a day (10 Oct 2023 14:32)  atorvastatin 40 mg oral tablet: 1 tab(s) orally once a day (10 Oct 2023 14:32)  insulin glargine 100 units/mL subcutaneous solution: 4 unit(s) subcutaneous once a day (at bedtime) (10 Oct 2023 14:32)  senna (sennosides) 8.6 mg oral tablet: 2 tab(s) orally once a day (at bedtime) (10 Oct 2023 14:32)  Velphoro 500 mg oral tablet, chewable: 3 tab(s) chewed 3 times a day (10 Oct 2023 14:32)

## 2023-10-11 NOTE — CONSULT NOTE ADULT - CRITICAL CARE ATTENDING COMMENT
48M h/o type 2 DM2, ESRD on HD (MWF; last 10/9), s/p b/l BKA, recent prolonged admission at [facility] for gas gangrene of R forearm and R 3rd digit requiring multiple irrigations and debridements (last 3/2023) with wound vac placement and eventual skin graft now presenting to [facility] with progressively worsening open wound at L BKA stump with purulent drainage. Upon initial ED arrival, pt was hypotensive to SBP of 60's, but improved with IVF and midodrine. CT performed on 10/10 showed gas tracking by left distal femur and taken to OR for revision of L BKA to AKA and abcess drainage. Post-operatively, pt hypotensive and started on phenylephrine 0.1 with improvement in blood pressure. ICU called for septic vs distributive shock       Acute Problems:  - Wound abscess with necrotizing fascitis of L BKA site  - Distributive shock (septic vs sedation related)    Recs:  - Shock state likely distributive from sepsis vs medication related from intra-op sedatives and pain meds, started on phenylephrine for pressor support and given Midodrine 15mg x1 - will continue midodrine, wean phenylephrine to maintain MAP > 65  - Continue broad spectrum abx pending culture data (BCx negative from admission) - previous cultures from last admission for R arm abscess grew E.Coli, Strep anginosus, Bacteroides fragilis  - Next HD per renal, no emergent requirements at this time   - Full Code  - Transfer to ICU for ongoing management 48M h/o type 2 DM2, ESRD on HD (MWF; last 10/9), s/p b/l BKA, recent prolonged admission at [facility] for gas gangrene of R forearm and R 3rd digit requiring multiple irrigations and debridements (last 3/2023) with wound vac placement and eventual skin graft now presenting to [facility] with progressively worsening open wound at L BKA stump with purulent drainage. Upon initial ED arrival, pt was hypotensive to SBP of 60's, but improved with IVF and midodrine. CT performed on 10/10 showed gas tracking by left distal femur and taken to OR for revision of L BKA to AKA and abscess drainage. EBL 300cc. Post-operatively, pt with temp of 100.9F and hypotensive. He was started on phenylephrine 0.1 with improvement in blood pressure. ICU called for septic vs distributive shock       Acute Problems:  - Wound abscess with necrotizing fascitis of L BKA site  - Distributive shock (septic vs sedation related)    Recs:  - Shock state likely distributive from sepsis vs medication related from intra-op sedatives and pain meds, started on phenylephrine for pressor support and given Midodrine 15mg x1 - will continue midodrine, wean phenylephrine to maintain MAP > 65  - Continue broad spectrum abx pending culture data (BCx negative from admission) - previous cultures from last admission for R arm abscess grew E.Coli, Strep anginosus, Bacteroides fragilis  - Next HD per renal, no emergent requirements at this time   - Full Code  - Transfer to ICU for ongoing management

## 2023-10-11 NOTE — CARE COORDINATION ASSESSMENT. - NSCAREPROVIDERS_GEN_ALL_CORE_FT
CARE PROVIDERS:  Accepting Physician: Roxanna Olivas  Administration: Yuliana Liang  Administration: Polly Contreras  Administration: Jason English  Admitting: Roxanna Olivas  Attending: Roxanna Olivas  Case Management: Celia Leigh  Consultant: Yong Renteria  Consultant: Apoorva Betancourt  Consultant: Weil, Patricia  Consultant: Deepak Woods  Consultant: Joey Breen  Consultant: Ajay Cruz  Consultant: Bradford Anderson  Consultant: Nii Alonzo  Consultant: Shila Alatorre  Consultant: Marissa Stauffer  Covering Team: Neo Pickett  Covering Team: Jody Waldrop  ED Attending: Celia Vizcarra  ED Nurse: Rah Sanon  ED Nurse 2: Nico Guillen  Emergency Medicine: Nii Toussaint  Nurse: Zaida Chan  Nurse: Chandu Santoyo  Nurse: Keysha Godinez  Nurse: Dionne Sutton  Ordered: ADM, User  Ordered: ServiceAccount, SCMMLM  Ordered: Doctor, Unknown  Ordered: Pahmary, Mohsen  Outpatient Provider: Pahlavan, Mohsen  Outpatient Provider: Tori Palmer  Override: Reyna Price  Override: Rosalva Fontaine  Override: Keysha Godinez  Override: izaiah Tijerina  PCA/Nursing Assistant: Michelle Alford  Primary Team: Paty Rea  Primary Team: Gene Pinto  Primary Team: Roxanna Olivas  Primary Team: Africa Morrell  Registered Dietitian: Batsheva Aceves  Registered Dietitian: Vickie Giordano  : Kenyatta Marquez

## 2023-10-11 NOTE — CHART NOTE - NSCHARTNOTEFT_GEN_A_CORE
INTERVAL HPI/OVERNIGHT EVENTS:    STATUS POST: Left knee disarticulation, amputation    POST OPERATIVE DAY #: 0    SUBJECTIVE:  Patient seen and examined bedside with no new complaints. Pain managed with medications. Denies bleeding at amputation site    MEDICATIONS  (STANDING):  albumin human  5% IVPB 250 milliLiter(s) IV Intermittent once  atorvastatin 40 milliGRAM(s) Oral at bedtime  dextrose 50% Injectable 12.5 Gram(s) IV Push once  dextrose 50% Injectable 25 Gram(s) IV Push once  dextrose 50% Injectable 25 Gram(s) IV Push once  dorzolamide 2%/timolol 0.5% Ophthalmic Solution 1 Drop(s) Both EYES two times a day  glucagon  Injectable 1 milliGRAM(s) IntraMuscular once  heparin   Injectable 5000 Unit(s) SubCutaneous every 12 hours  insulin glargine Injectable (LANTUS) 4 Unit(s) SubCutaneous at bedtime  insulin lispro (ADMELOG) corrective regimen sliding scale   SubCutaneous every 6 hours  lactobacillus acidophilus 1 Tablet(s) Oral every 12 hours  midodrine. 10 milliGRAM(s) Oral three times a day  pantoprazole    Tablet 40 milliGRAM(s) Oral before breakfast  phenylephrine    Infusion 0.8 MICROgram(s)/kG/Min (17.7 mL/Hr) IV Continuous <Continuous>  piperacillin/tazobactam IVPB.. 3.375 Gram(s) IV Intermittent every 12 hours  senna 2 Tablet(s) Oral at bedtime  vancomycin  IVPB 500 milliGRAM(s) IV Intermittent once    MEDICATIONS  (PRN):  acetaminophen     Tablet .. 650 milliGRAM(s) Oral every 4 hours PRN Temp greater or equal to 38C (100.4F), Mild Pain (1 - 3)  aluminum hydroxide/magnesium hydroxide/simethicone Suspension 30 milliLiter(s) Oral every 4 hours PRN Dyspepsia  melatonin 3 milliGRAM(s) Oral at bedtime PRN Insomnia  ondansetron Injectable 4 milliGRAM(s) IV Push every 8 hours PRN Nausea and/or Vomiting      Vital Signs Last 24 Hrs  T(C): 36.8 (11 Oct 2023 18:02), Max: 38.5 (11 Oct 2023 05:00)  T(F): 98.2 (11 Oct 2023 18:02), Max: 101.3 (11 Oct 2023 05:00)  HR: 79 (11 Oct 2023 18:15) (72 - 85)  BP: 116/58 (11 Oct 2023 18:15) (75/45 - 163/68)  BP(mean): 83 (11 Oct 2023 18:15) (80 - 99)  RR: 23 (11 Oct 2023 18:15) (10 - 41)  SpO2: 98% (11 Oct 2023 18:15) (97% - 100%)    Parameters below as of 11 Oct 2023 17:45  Patient On (Oxygen Delivery Method): room air    PHYSICAL EXAM:  Constitutional: NAD, resting comfortably in bed  Extremities: Left surgical site wrapped in ACE wrap. Minimal strikethrough with betadine posteriorly. ROM grossly intact bilaterally  Psychiatric: Normal mood, A&Ox3      I&O's Detail    11 Oct 2023 07:01  -  11 Oct 2023 18:45  --------------------------------------------------------  IN:    Oral Fluid: 50 mL    PRBCs (Packed Red Blood Cells): 130 mL  Total IN: 180 mL    OUT:  Total OUT: 0 mL    Total NET: 180 mL          LABS:                        9.2    17.79 )-----------( 386      ( 11 Oct 2023 06:50 )             28.4     10-11    135  |  100  |  37<H>  ----------------------------<  193<H>  3.6   |  23  |  9.10<H>    Ca    9.1      11 Oct 2023 10:17    TPro  7.3  /  Alb  2.1<L>  /  TBili  0.7  /  DBili  x   /  AST  13<L>  /  ALT  19  /  AlkPhos  125<H>  10-11    PT/INR - ( 11 Oct 2023 06:50 )   PT: 15.3 sec;   INR: 1.32 ratio         PTT - ( 10 Oct 2023 11:15 )  PTT:29.2 sec  Urinalysis Basic - ( 11 Oct 2023 10:17 )    Color: x / Appearance: x / SG: x / pH: x  Gluc: 193 mg/dL / Ketone: x  / Bili: x / Urobili: x   Blood: x / Protein: x / Nitrite: x   Leuk Esterase: x / RBC: x / WBC x   Sq Epi: x / Non Sq Epi: x / Bacteria: x      RADIOLOGY & ADDITIONAL STUDIES:    ASSESSMENT AND PLAN:  48M h/o type 2 DM2, ESRD on HD (MWF; last 10/9), s/p b/l BKA presenting to [facility] with progressively worsening open wound at L BKA stump with purulent drainage. Vascular surgery was consulted for concern of necrotizing fascitis. Patient is no POD0 s/p Left knee disarticulation/amputation. Patient hypotensive intraop and receive sherry, now tapering off phenylephrine in ICU. Otherwise recovering well.    - FU intraop cultures  - Daily dressing changes with betadine kerlix, abd pads, dry kerlix, and ACE wraps  - Pain control with standing tylenol gabapentin and PRNs

## 2023-10-11 NOTE — DIETITIAN INITIAL EVALUATION ADULT - ORAL INTAKE PTA/DIET HISTORY
Pt lives at home with family. Reports eating well PTA. Typically consumes 3 meals/day. Consumes 1-2 Nepro supplements per day. Follows a renal diet at home. 
independent

## 2023-10-11 NOTE — PROGRESS NOTE ADULT - SUBJECTIVE AND OBJECTIVE BOX
Interval History:    CENTRAL LINE:   [  ] YES       [  ] NO  FLORES:                 [  ] YES       [  ] NO         REVIEW OF SYSTEMS:  All Systems below were reviewed and are negative [  ]  HEENT:  ID:  Pulmonary:  Cardiac:  GI:  Renal:  Musculoskeletal:  All other systems above were reviewed and are negative   [  ]      MEDICATIONS  (STANDING):  albumin human  5% IVPB 250 milliLiter(s) IV Intermittent once  atorvastatin 40 milliGRAM(s) Oral at bedtime  dextrose 50% Injectable 25 Gram(s) IV Push once  dextrose 50% Injectable 25 Gram(s) IV Push once  dextrose 50% Injectable 12.5 Gram(s) IV Push once  dorzolamide 2%/timolol 0.5% Ophthalmic Solution 1 Drop(s) Both EYES two times a day  glucagon  Injectable 1 milliGRAM(s) IntraMuscular once  heparin   Injectable 5000 Unit(s) SubCutaneous every 12 hours  insulin glargine Injectable (LANTUS) 4 Unit(s) SubCutaneous at bedtime  insulin lispro (ADMELOG) corrective regimen sliding scale   SubCutaneous every 6 hours  lactobacillus acidophilus 1 Tablet(s) Oral every 12 hours  midodrine. 10 milliGRAM(s) Oral three times a day  pantoprazole    Tablet 40 milliGRAM(s) Oral before breakfast  phenylephrine    Infusion 0.8 MICROgram(s)/kG/Min (17.7 mL/Hr) IV Continuous <Continuous>  piperacillin/tazobactam IVPB.. 3.375 Gram(s) IV Intermittent every 12 hours  senna 2 Tablet(s) Oral at bedtime    MEDICATIONS  (PRN):  acetaminophen     Tablet .. 650 milliGRAM(s) Oral every 4 hours PRN Temp greater or equal to 38C (100.4F), Mild Pain (1 - 3)  aluminum hydroxide/magnesium hydroxide/simethicone Suspension 30 milliLiter(s) Oral every 4 hours PRN Dyspepsia  melatonin 3 milliGRAM(s) Oral at bedtime PRN Insomnia  ondansetron Injectable 4 milliGRAM(s) IV Push every 8 hours PRN Nausea and/or Vomiting      Vital Signs Last 24 Hrs  T(C): 37.2 (11 Oct 2023 20:08), Max: 38.5 (11 Oct 2023 05:00)  T(F): 99 (11 Oct 2023 20:08), Max: 101.3 (11 Oct 2023 05:00)  HR: 71 (11 Oct 2023 20:31) (71 - 85)  BP: 142/69 (11 Oct 2023 20:31) (75/45 - 163/68)  BP(mean): 99 (11 Oct 2023 20:31) (71 - 99)  RR: 20 (11 Oct 2023 20:31) (10 - 41)  SpO2: 100% (11 Oct 2023 20:31) (95% - 100%)    Parameters below as of 11 Oct 2023 20:00  Patient On (Oxygen Delivery Method): room air        I&O's Summary    11 Oct 2023 07:01  -  11 Oct 2023 20:39  --------------------------------------------------------  IN: 909.6 mL / OUT: 0 mL / NET: 909.6 mL        PHYSICAL EXAM:  HEENT: NC/AT; PERRLA  Neck: Soft; no tenderness  Lungs: CTA bilaterally; no wheezing.   Heart:  Abdomen:  Genital/ Rectal:  Extremities:  Neurologic:  Vascular:      LABORATORY:    CBC Full  -  ( 11 Oct 2023 18:40 )  WBC Count : 21.99 K/uL  RBC Count : 3.92 M/uL  Hemoglobin : 11.8 g/dL  Hematocrit : 36.7 %  Platelet Count - Automated : 456 K/uL  Mean Cell Volume : 93.6 fl  Mean Cell Hemoglobin : 30.1 pg  Mean Cell Hemoglobin Concentration : 32.2 gm/dL  Auto Neutrophil # : 19.99 K/uL  Auto Lymphocyte # : 1.13 K/uL  Auto Monocyte # : 0.48 K/uL  Auto Eosinophil # : 0.07 K/uL  Auto Basophil # : 0.13 K/uL  Auto Neutrophil % : 90.9 %  Auto Lymphocyte % : 5.1 %  Auto Monocyte % : 2.2 %  Auto Eosinophil % : 0.3 %  Auto Basophil % : 0.6 %          10-11    136  |  100  |  38<H>  ----------------------------<  219<H>  3.8   |  20<L>  |  9.40<H>    Ca    8.8      11 Oct 2023 18:40  Phos  4.3     10-11  Mg     2.5     10-11    TPro  7.8  /  Alb  2.1<L>  /  TBili  0.9  /  DBili  x   /  AST  12<L>  /  ALT  18  /  AlkPhos  136<H>  10-11          Assessment and Plan:          Nii Alonzo MD   [phone].    He is afebrile  Lethargic   No fevers       MEDICATIONS  (STANDING):  albumin human  5% IVPB 250 milliLiter(s) IV Intermittent once  atorvastatin 40 milliGRAM(s) Oral at bedtime  dextrose 50% Injectable 25 Gram(s) IV Push once  dextrose 50% Injectable 25 Gram(s) IV Push once  dextrose 50% Injectable 12.5 Gram(s) IV Push once  dorzolamide 2%/timolol 0.5% Ophthalmic Solution 1 Drop(s) Both EYES two times a day  glucagon  Injectable 1 milliGRAM(s) IntraMuscular once  heparin   Injectable 5000 Unit(s) SubCutaneous every 12 hours  insulin glargine Injectable (LANTUS) 4 Unit(s) SubCutaneous at bedtime  insulin lispro (ADMELOG) corrective regimen sliding scale   SubCutaneous every 6 hours  lactobacillus acidophilus 1 Tablet(s) Oral every 12 hours  midodrine. 10 milliGRAM(s) Oral three times a day  pantoprazole    Tablet 40 milliGRAM(s) Oral before breakfast  phenylephrine    Infusion 0.8 MICROgram(s)/kG/Min (17.7 mL/Hr) IV Continuous <Continuous>  piperacillin/tazobactam IVPB.. 3.375 Gram(s) IV Intermittent every 12 hours  senna 2 Tablet(s) Oral at bedtime    MEDICATIONS  (PRN):  acetaminophen     Tablet .. 650 milliGRAM(s) Oral every 4 hours PRN Temp greater or equal to 38C (100.4F), Mild Pain (1 - 3)  aluminum hydroxide/magnesium hydroxide/simethicone Suspension 30 milliLiter(s) Oral every 4 hours PRN Dyspepsia  melatonin 3 milliGRAM(s) Oral at bedtime PRN Insomnia  ondansetron Injectable 4 milliGRAM(s) IV Push every 8 hours PRN Nausea and/or Vomiting      Vital Signs Last 24 Hrs  T(C): 37.2 (11 Oct 2023 20:08), Max: 38.5 (11 Oct 2023 05:00)  T(F): 99 (11 Oct 2023 20:08), Max: 101.3 (11 Oct 2023 05:00)  HR: 71 (11 Oct 2023 20:31) (71 - 85)  BP: 142/69 (11 Oct 2023 20:31) (75/45 - 163/68)  BP(mean): 99 (11 Oct 2023 20:31) (71 - 99)  RR: 20 (11 Oct 2023 20:31) (10 - 41)  SpO2: 100% (11 Oct 2023 20:31) (95% - 100%)    Parameters below as of 11 Oct 2023 20:00  Patient On (Oxygen Delivery Method): room air        I&O's Summary    11 Oct 2023 07:01  -  11 Oct 2023 20:39  --------------------------------------------------------  IN: 909.6 mL / OUT: 0 mL / NET: 909.6 mL        PHYSICAL EXAM:  HEENT: NC/AT; PERRLA  Neck: Soft; no tenderness  Lungs: CTA bilaterally; no wheezing.   Heart:  Abdomen:  Genital/ Rectal:  Extremities:  Neurologic:  Vascular:      LABORATORY:    CBC Full  -  ( 11 Oct 2023 18:40 )  WBC Count : 21.99 K/uL  RBC Count : 3.92 M/uL  Hemoglobin : 11.8 g/dL  Hematocrit : 36.7 %  Platelet Count - Automated : 456 K/uL  Mean Cell Volume : 93.6 fl  Mean Cell Hemoglobin : 30.1 pg  Mean Cell Hemoglobin Concentration : 32.2 gm/dL    1. Left posterior infected wound  2. Sepsis, likely due to infected wound.  3. ESRD on HD  4. Bilateral BKA.      Follow wound culture  Surgery evaluation for possible need of debridement of infected wound.  Continue IV Zosyn q12h. Get Vanco trough tomorrow and if below 15, redose with another 1 gm IV Vancomycin  Wound care daily.  Auto Neutrophil # : 19.99 K/uL  Auto Lymphocyte # : 1.13 K/uL  Auto Monocyte # : 0.48 K/uL  Auto Eosinophil # : 0.07 K/uL  Auto Basophil # : 0.13 K/uL  Auto Neutrophil % : 90.9 %  Auto Lymphocyte % : 5.1 %  Auto Monocyte % : 2.2 %  Auto Eosinophil % : 0.3 %  Auto Basophil % : 0.6 %          10-11    136  |  100  |  38<H>  ----------------------------<  219<H>  3.8   |  20<L>  |  9.40<H>    Ca    8.8      11 Oct 2023 18:40  Phos  4.3     10-11  Mg     2.5     10-11    TPro  7.8  /  Alb  2.1<L>  /  TBili  0.9  /  DBili  x   /  AST  12<L>  /  ALT  18  /  AlkPhos  136<H>  10-11          Assessment and Plan:      1. Left posterior infected wound  2. Sepsis, likely due to infected wound.  3. ESRD on HD  4. Bilateral BKA.      Follow wound culture  Surgery evaluation for possible need of debridement of infected wound.  Continue IV Zosyn q12h. Get Vanco trough tomorrow and if below 15, redose with another 1 gm IV Vancomycin  Wound care daily.        Nii Alonzo MD   [phone].    He is afebrile  Lethargic   No fevers       MEDICATIONS  (STANDING):  albumin human  5% IVPB 250 milliLiter(s) IV Intermittent once  atorvastatin 40 milliGRAM(s) Oral at bedtime  dextrose 50% Injectable 25 Gram(s) IV Push once  dextrose 50% Injectable 25 Gram(s) IV Push once  dextrose 50% Injectable 12.5 Gram(s) IV Push once  dorzolamide 2%/timolol 0.5% Ophthalmic Solution 1 Drop(s) Both EYES two times a day  glucagon  Injectable 1 milliGRAM(s) IntraMuscular once  heparin   Injectable 5000 Unit(s) SubCutaneous every 12 hours  insulin glargine Injectable (LANTUS) 4 Unit(s) SubCutaneous at bedtime  insulin lispro (ADMELOG) corrective regimen sliding scale   SubCutaneous every 6 hours  lactobacillus acidophilus 1 Tablet(s) Oral every 12 hours  midodrine. 10 milliGRAM(s) Oral three times a day  pantoprazole    Tablet 40 milliGRAM(s) Oral before breakfast  phenylephrine    Infusion 0.8 MICROgram(s)/kG/Min (17.7 mL/Hr) IV Continuous <Continuous>  piperacillin/tazobactam IVPB.. 3.375 Gram(s) IV Intermittent every 12 hours  senna 2 Tablet(s) Oral at bedtime    MEDICATIONS  (PRN):  acetaminophen     Tablet .. 650 milliGRAM(s) Oral every 4 hours PRN Temp greater or equal to 38C (100.4F), Mild Pain (1 - 3)  aluminum hydroxide/magnesium hydroxide/simethicone Suspension 30 milliLiter(s) Oral every 4 hours PRN Dyspepsia  melatonin 3 milliGRAM(s) Oral at bedtime PRN Insomnia  ondansetron Injectable 4 milliGRAM(s) IV Push every 8 hours PRN Nausea and/or Vomiting      Vital Signs Last 24 Hrs  T(C): 37.2 (11 Oct 2023 20:08), Max: 38.5 (11 Oct 2023 05:00)  T(F): 99 (11 Oct 2023 20:08), Max: 101.3 (11 Oct 2023 05:00)  HR: 71 (11 Oct 2023 20:31) (71 - 85)  BP: 142/69 (11 Oct 2023 20:31) (75/45 - 163/68)  BP(mean): 99 (11 Oct 2023 20:31) (71 - 99)  RR: 20 (11 Oct 2023 20:31) (10 - 41)  SpO2: 100% (11 Oct 2023 20:31) (95% - 100%)    Parameters below as of 11 Oct 2023 20:00  Patient On (Oxygen Delivery Method): room air        I&O's Summary    11 Oct 2023 07:01  -  11 Oct 2023 20:39  --------------------------------------------------------  IN: 909.6 mL / OUT: 0 mL / NET: 909.6 mL        PHYSICAL EXAM:  HEENT: NC/AT; PERRLA  Neck: Soft; no tenderness  Lungs: CTA bilaterally; no wheezing.   Heart: RRR, no murmurs.  Abdomen: Soft, no tenderness  Genital/ Rectal: No lackey catheter  Extremities: L AKA wound with clean dressings  Neurologic: Awake      LABORATORY:    CBC Full  -  ( 11 Oct 2023 18:40 )  WBC Count : 21.99 K/uL  RBC Count : 3.92 M/uL  Hemoglobin : 11.8 g/dL  Hematocrit : 36.7 %  Platelet Count - Automated : 456 K/uL  Mean Cell Volume : 93.6 fl  Mean Cell Hemoglobin : 30.1 pg  Mean Cell Hemoglobin Concentration : 32.2 gm/dL    1. Left posterior infected wound  2. Sepsis, likely due to infected wound.  3. ESRD on HD  4. Bilateral BKA.      Follow wound culture  Surgery evaluation for possible need of debridement of infected wound.  Continue IV Zosyn q12h. Get Vanco trough tomorrow and if below 15, redose with another 1 gm IV Vancomycin  Wound care daily.  Auto Neutrophil # : 19.99 K/uL  Auto Lymphocyte # : 1.13 K/uL  Auto Monocyte # : 0.48 K/uL  Auto Eosinophil # : 0.07 K/uL  Auto Basophil # : 0.13 K/uL  Auto Neutrophil % : 90.9 %  Auto Lymphocyte % : 5.1 %  Auto Monocyte % : 2.2 %  Auto Eosinophil % : 0.3 %  Auto Basophil % : 0.6 %          10-11    136  |  100  |  38<H>  ----------------------------<  219<H>  3.8   |  20<L>  |  9.40<H>    Ca    8.8      11 Oct 2023 18:40  Phos  4.3     10-11  Mg     2.5     10-11    TPro  7.8  /  Alb  2.1<L>  /  TBili  0.9  /  DBili  x   /  AST  12<L>  /  ALT  18  /  AlkPhos  136<H>  10-11          Assessment and Plan:      1. Left posterior thigh infected wound with necrotizing fasciitis, s/p debridement and left AKA.   2. Sepsis, likely due to infected wound.  3. ESRD on HD  4. Bilateral BKA.      Follow wound cultures.  Continue IV Zosyn q12h. Vanco trough today is 15. Give him another 500 mg IV Vanco today and repeat Vanco trough tomorrow.   Wound care daily as per surgery.        Nii Alonzo MD   [phone].

## 2023-10-11 NOTE — DIETITIAN INITIAL EVALUATION ADULT - OTHER INFO
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 Sin/Dr. Singh) who came to the ED [facility] with weakness for 2 weeks. In the ED he was hypotensive (BP 66/40), fever of 100.8F and high WBC of 19K. The patient had a small wound on the left posterior thigh for the last one month which was reportedly draining pus. He was seem by wound care consult. He was given IV Zosyn and Vancomycin in the ED."    Visited pt at bedside this am. Pt currently NPO for possible procedure today. Reports good appetite/intake PTA. [place] allergy noted. Denies chewing/swallowing difficulties. Denies N/V/D/C. No BMs thus far; bowel regimen rx. Pt reports UBW of ~143#. Bedscale wt of 132# obtained today. Reports stable weight. No edema noted. Skin: stage I to sacrum, stage I to BL buttocks, unstageable pressure ulcer of stump of L below knee amputation. Discussed importance of adequate protein intake for wound healing. Recommend initiating consistent carbohydrate, renal diet with Nepro shake BID/Bruce BID when medically feasible.

## 2023-10-11 NOTE — DIETITIAN INITIAL EVALUATION ADULT - NSICDXPASTMEDICALHX_GEN_ALL_CORE_FT
PAST MEDICAL HISTORY:  Anemia of chronic disease     Constipation     Diabetes mellitus     ESRD on dialysis     Gangrene of finger of right hand     H/O hypotension     HLD (hyperlipidemia)     Leg wound, left     PVD (peripheral vascular disease)     Steal syndrome dialysis vascular access

## 2023-10-11 NOTE — DIETITIAN INITIAL EVALUATION ADULT - NSFNSPHYEXAMSKINFT_GEN_A_CORE
Pressure Injury 1: sacrum, Stage I  Pressure Injury 2: Left:, buttocks, Stage I  Pressure Injury 3: Right:, buttocks, Stage I  L knee stump wound

## 2023-10-12 LAB
ALBUMIN SERPL ELPH-MCNC: 2 G/DL — LOW (ref 3.3–5)
ALP SERPL-CCNC: 113 U/L — SIGNIFICANT CHANGE UP (ref 40–120)
ALT FLD-CCNC: 15 U/L — SIGNIFICANT CHANGE UP (ref 12–78)
ANION GAP SERPL CALC-SCNC: 16 MMOL/L — SIGNIFICANT CHANGE UP (ref 5–17)
AST SERPL-CCNC: 7 U/L — LOW (ref 15–37)
BASOPHILS # BLD AUTO: 0.05 K/UL — SIGNIFICANT CHANGE UP (ref 0–0.2)
BASOPHILS NFR BLD AUTO: 0.2 % — SIGNIFICANT CHANGE UP (ref 0–2)
BILIRUB SERPL-MCNC: 0.7 MG/DL — SIGNIFICANT CHANGE UP (ref 0.2–1.2)
BUN SERPL-MCNC: 46 MG/DL — HIGH (ref 7–23)
CALCIUM SERPL-MCNC: 8.5 MG/DL — SIGNIFICANT CHANGE UP (ref 8.5–10.1)
CHLORIDE SERPL-SCNC: 99 MMOL/L — SIGNIFICANT CHANGE UP (ref 96–108)
CO2 SERPL-SCNC: 19 MMOL/L — LOW (ref 22–31)
CREAT SERPL-MCNC: 10 MG/DL — HIGH (ref 0.5–1.3)
EGFR: 6 ML/MIN/1.73M2 — LOW
EOSINOPHIL # BLD AUTO: 0 K/UL — SIGNIFICANT CHANGE UP (ref 0–0.5)
EOSINOPHIL NFR BLD AUTO: 0 % — SIGNIFICANT CHANGE UP (ref 0–6)
GLUCOSE SERPL-MCNC: 313 MG/DL — HIGH (ref 70–99)
HCT VFR BLD CALC: 31.2 % — LOW (ref 39–50)
HGB BLD-MCNC: 10 G/DL — LOW (ref 13–17)
IMM GRANULOCYTES NFR BLD AUTO: 0.9 % — SIGNIFICANT CHANGE UP (ref 0–0.9)
LYMPHOCYTES # BLD AUTO: 1.11 K/UL — SIGNIFICANT CHANGE UP (ref 1–3.3)
LYMPHOCYTES # BLD AUTO: 5 % — LOW (ref 13–44)
MAGNESIUM SERPL-MCNC: 2.4 MG/DL — SIGNIFICANT CHANGE UP (ref 1.6–2.6)
MCHC RBC-ENTMCNC: 29.6 PG — SIGNIFICANT CHANGE UP (ref 27–34)
MCHC RBC-ENTMCNC: 32.1 GM/DL — SIGNIFICANT CHANGE UP (ref 32–36)
MCV RBC AUTO: 92.3 FL — SIGNIFICANT CHANGE UP (ref 80–100)
MONOCYTES # BLD AUTO: 0.54 K/UL — SIGNIFICANT CHANGE UP (ref 0–0.9)
MONOCYTES NFR BLD AUTO: 2.4 % — SIGNIFICANT CHANGE UP (ref 2–14)
NEUTROPHILS # BLD AUTO: 20.2 K/UL — HIGH (ref 1.8–7.4)
NEUTROPHILS NFR BLD AUTO: 91.5 % — HIGH (ref 43–77)
NRBC # BLD: 0 /100 WBCS — SIGNIFICANT CHANGE UP (ref 0–0)
PHOSPHATE SERPL-MCNC: 6.6 MG/DL — HIGH (ref 2.5–4.5)
PLATELET # BLD AUTO: 427 K/UL — HIGH (ref 150–400)
POTASSIUM SERPL-MCNC: 4.1 MMOL/L — SIGNIFICANT CHANGE UP (ref 3.5–5.3)
POTASSIUM SERPL-SCNC: 4.1 MMOL/L — SIGNIFICANT CHANGE UP (ref 3.5–5.3)
PROT SERPL-MCNC: 6.7 G/DL — SIGNIFICANT CHANGE UP (ref 6–8.3)
RBC # BLD: 3.38 M/UL — LOW (ref 4.2–5.8)
RBC # FLD: 16.5 % — HIGH (ref 10.3–14.5)
SODIUM SERPL-SCNC: 134 MMOL/L — LOW (ref 135–145)
VANCOMYCIN TROUGH SERPL-MCNC: 19.8 UG/ML — SIGNIFICANT CHANGE UP (ref 10–20)
WBC # BLD: 22.1 K/UL — HIGH (ref 3.8–10.5)
WBC # FLD AUTO: 22.1 K/UL — HIGH (ref 3.8–10.5)

## 2023-10-12 PROCEDURE — 99291 CRITICAL CARE FIRST HOUR: CPT | Mod: GC

## 2023-10-12 RX ORDER — OXYCODONE AND ACETAMINOPHEN 5; 325 MG/1; MG/1
1 TABLET ORAL ONCE
Refills: 0 | Status: DISCONTINUED | OUTPATIENT
Start: 2023-10-12 | End: 2023-10-12

## 2023-10-12 RX ORDER — VANCOMYCIN HCL 1 G
750 VIAL (EA) INTRAVENOUS ONCE
Refills: 0 | Status: COMPLETED | OUTPATIENT
Start: 2023-10-12 | End: 2023-10-12

## 2023-10-12 RX ORDER — OXYCODONE HYDROCHLORIDE 5 MG/1
5 TABLET ORAL EVERY 6 HOURS
Refills: 0 | Status: DISCONTINUED | OUTPATIENT
Start: 2023-10-12 | End: 2023-10-17

## 2023-10-12 RX ORDER — INSULIN GLARGINE 100 [IU]/ML
5 INJECTION, SOLUTION SUBCUTANEOUS AT BEDTIME
Refills: 0 | Status: DISCONTINUED | OUTPATIENT
Start: 2023-10-12 | End: 2023-10-13

## 2023-10-12 RX ORDER — INSULIN LISPRO 100/ML
3 VIAL (ML) SUBCUTANEOUS
Refills: 0 | Status: DISCONTINUED | OUTPATIENT
Start: 2023-10-12 | End: 2023-10-13

## 2023-10-12 RX ORDER — NOREPINEPHRINE BITARTRATE/D5W 8 MG/250ML
0.05 PLASTIC BAG, INJECTION (ML) INTRAVENOUS
Qty: 8 | Refills: 0 | Status: DISCONTINUED | OUTPATIENT
Start: 2023-10-12 | End: 2023-10-15

## 2023-10-12 RX ORDER — POLYETHYLENE GLYCOL 3350 17 G/17G
17 POWDER, FOR SOLUTION ORAL DAILY
Refills: 0 | Status: DISCONTINUED | OUTPATIENT
Start: 2023-10-12 | End: 2023-10-15

## 2023-10-12 RX ADMIN — MIDODRINE HYDROCHLORIDE 10 MILLIGRAM(S): 2.5 TABLET ORAL at 05:02

## 2023-10-12 RX ADMIN — Medication 4: at 21:49

## 2023-10-12 RX ADMIN — ATORVASTATIN CALCIUM 40 MILLIGRAM(S): 80 TABLET, FILM COATED ORAL at 21:49

## 2023-10-12 RX ADMIN — HEPARIN SODIUM 5000 UNIT(S): 5000 INJECTION INTRAVENOUS; SUBCUTANEOUS at 17:25

## 2023-10-12 RX ADMIN — OXYCODONE AND ACETAMINOPHEN 1 TABLET(S): 5; 325 TABLET ORAL at 14:45

## 2023-10-12 RX ADMIN — DORZOLAMIDE HYDROCHLORIDE TIMOLOL MALEATE 1 DROP(S): 20; 5 SOLUTION/ DROPS OPHTHALMIC at 08:21

## 2023-10-12 RX ADMIN — MIDODRINE HYDROCHLORIDE 10 MILLIGRAM(S): 2.5 TABLET ORAL at 12:31

## 2023-10-12 RX ADMIN — OXYCODONE HYDROCHLORIDE 5 MILLIGRAM(S): 5 TABLET ORAL at 20:30

## 2023-10-12 RX ADMIN — PIPERACILLIN AND TAZOBACTAM 25 GRAM(S): 4; .5 INJECTION, POWDER, LYOPHILIZED, FOR SOLUTION INTRAVENOUS at 12:31

## 2023-10-12 RX ADMIN — HEPARIN SODIUM 5000 UNIT(S): 5000 INJECTION INTRAVENOUS; SUBCUTANEOUS at 05:02

## 2023-10-12 RX ADMIN — Medication 1 TABLET(S): at 17:25

## 2023-10-12 RX ADMIN — MIDODRINE HYDROCHLORIDE 10 MILLIGRAM(S): 2.5 TABLET ORAL at 17:24

## 2023-10-12 RX ADMIN — OXYCODONE HYDROCHLORIDE 5 MILLIGRAM(S): 5 TABLET ORAL at 21:32

## 2023-10-12 RX ADMIN — PANTOPRAZOLE SODIUM 40 MILLIGRAM(S): 20 TABLET, DELAYED RELEASE ORAL at 05:03

## 2023-10-12 RX ADMIN — Medication 250 MILLIGRAM(S): at 14:20

## 2023-10-12 RX ADMIN — Medication 4: at 08:19

## 2023-10-12 RX ADMIN — OXYCODONE HYDROCHLORIDE 5 MILLIGRAM(S): 5 TABLET ORAL at 13:46

## 2023-10-12 RX ADMIN — Medication 3 UNIT(S): at 17:30

## 2023-10-12 RX ADMIN — Medication 1: at 12:25

## 2023-10-12 RX ADMIN — OXYCODONE AND ACETAMINOPHEN 1 TABLET(S): 5; 325 TABLET ORAL at 14:03

## 2023-10-12 RX ADMIN — Medication 1 TABLET(S): at 05:03

## 2023-10-12 RX ADMIN — Medication 3 UNIT(S): at 08:19

## 2023-10-12 RX ADMIN — PIPERACILLIN AND TAZOBACTAM 25 GRAM(S): 4; .5 INJECTION, POWDER, LYOPHILIZED, FOR SOLUTION INTRAVENOUS at 23:06

## 2023-10-12 RX ADMIN — Medication 4: at 17:26

## 2023-10-12 RX ADMIN — INSULIN GLARGINE 5 UNIT(S): 100 INJECTION, SOLUTION SUBCUTANEOUS at 21:50

## 2023-10-12 RX ADMIN — POLYETHYLENE GLYCOL 3350 17 GRAM(S): 17 POWDER, FOR SOLUTION ORAL at 12:31

## 2023-10-12 RX ADMIN — Medication 6 MICROGRAM(S)/KG/MIN: at 10:57

## 2023-10-12 RX ADMIN — SENNA PLUS 2 TABLET(S): 8.6 TABLET ORAL at 21:49

## 2023-10-12 NOTE — PROGRESS NOTE ADULT - SUBJECTIVE AND OBJECTIVE BOX
Interval History:    CENTRAL LINE:   [  ] YES       [  ] NO  FLORES:                 [  ] YES       [  ] NO         REVIEW OF SYSTEMS:  All Systems below were reviewed and are negative [  ]  HEENT:  ID:  Pulmonary:  Cardiac:  GI:  Renal:  Musculoskeletal:  All other systems above were reviewed and are negative   [  ]      MEDICATIONS  (STANDING):  atorvastatin 40 milliGRAM(s) Oral at bedtime  dextrose 5%. 1000 milliLiter(s) (100 mL/Hr) IV Continuous <Continuous>  dextrose 5%. 1000 milliLiter(s) (50 mL/Hr) IV Continuous <Continuous>  dextrose 50% Injectable 25 Gram(s) IV Push once  dextrose 50% Injectable 25 Gram(s) IV Push once  dextrose 50% Injectable 12.5 Gram(s) IV Push once  dorzolamide 2%/timolol 0.5% Ophthalmic Solution 1 Drop(s) Both EYES two times a day  glucagon  Injectable 1 milliGRAM(s) IntraMuscular once  heparin   Injectable 5000 Unit(s) SubCutaneous every 12 hours  insulin glargine Injectable (LANTUS) 5 Unit(s) SubCutaneous at bedtime  insulin lispro (ADMELOG) corrective regimen sliding scale   SubCutaneous Before meals and at bedtime  insulin lispro Injectable (ADMELOG) 3 Unit(s) SubCutaneous three times a day before meals  lactobacillus acidophilus 1 Tablet(s) Oral every 12 hours  midodrine. 10 milliGRAM(s) Oral three times a day  norepinephrine Infusion 0.05 MICROgram(s)/kG/Min (6 mL/Hr) IV Continuous <Continuous>  pantoprazole    Tablet 40 milliGRAM(s) Oral before breakfast  piperacillin/tazobactam IVPB.. 3.375 Gram(s) IV Intermittent every 12 hours  polyethylene glycol 3350 17 Gram(s) Oral daily  senna 2 Tablet(s) Oral at bedtime    MEDICATIONS  (PRN):  acetaminophen     Tablet .. 650 milliGRAM(s) Oral every 4 hours PRN Temp greater or equal to 38C (100.4F), Mild Pain (1 - 3)  aluminum hydroxide/magnesium hydroxide/simethicone Suspension 30 milliLiter(s) Oral every 4 hours PRN Dyspepsia  melatonin 3 milliGRAM(s) Oral at bedtime PRN Insomnia  ondansetron Injectable 4 milliGRAM(s) IV Push every 8 hours PRN Nausea and/or Vomiting  oxyCODONE    IR 5 milliGRAM(s) Oral every 6 hours PRN Moderate Pain (4 - 6)      Vital Signs Last 24 Hrs  T(C): 36.6 (12 Oct 2023 16:07), Max: 37.2 (11 Oct 2023 20:08)  T(F): 97.8 (12 Oct 2023 16:07), Max: 99 (11 Oct 2023 20:08)  HR: 79 (12 Oct 2023 19:30) (59 - 82)  BP: 92/54 (12 Oct 2023 19:30) (67/41 - 193/90)  BP(mean): 68 (12 Oct 2023 19:30) (50 - 129)  RR: 19 (12 Oct 2023 19:30) (6 - 24)  SpO2: 100% (12 Oct 2023 19:30) (93% - 100%)    Parameters below as of 12 Oct 2023 09:30  Patient On (Oxygen Delivery Method): room air        I&O's Summary    11 Oct 2023 07:01  -  12 Oct 2023 07:00  --------------------------------------------------------  IN: 1164.4 mL / OUT: 0 mL / NET: 1164.4 mL    12 Oct 2023 07:01  -  12 Oct 2023 19:44  --------------------------------------------------------  IN: 489 mL / OUT: 2000 mL / NET: -1511 mL        PHYSICAL EXAM:  HEENT: NC/AT; PERRLA  Neck: Soft; no tenderness  Lungs: CTA bilaterally; no wheezing.   Heart:  Abdomen:  Genital/ Rectal:  Extremities:  Neurologic:  Vascular:      LABORATORY:    CBC Full  -  ( 12 Oct 2023 06:33 )  WBC Count : 22.10 K/uL  RBC Count : 3.38 M/uL  Hemoglobin : 10.0 g/dL  Hematocrit : 31.2 %  Platelet Count - Automated : 427 K/uL  Mean Cell Volume : 92.3 fl  Mean Cell Hemoglobin : 29.6 pg  Mean Cell Hemoglobin Concentration : 32.1 gm/dL  Auto Neutrophil # : 20.20 K/uL  Auto Lymphocyte # : 1.11 K/uL  Auto Monocyte # : 0.54 K/uL  Auto Eosinophil # : 0.00 K/uL  Auto Basophil # : 0.05 K/uL  Auto Neutrophil % : 91.5 %  Auto Lymphocyte % : 5.0 %  Auto Monocyte % : 2.4 %  Auto Eosinophil % : 0.0 %  Auto Basophil % : 0.2 %          10-12    134<L>  |  99  |  46<H>  ----------------------------<  313<H>  4.1   |  19<L>  |  10.00<H>    Ca    8.5      12 Oct 2023 06:33  Phos  6.6     10-12  Mg     2.4     10-12    TPro  6.7  /  Alb  2.0<L>  /  TBili  0.7  /  DBili  x   /  AST  7<L>  /  ALT  15  /  AlkPhos  113  10-12          Assessment and Plan:          Nii Alonzo MD   [phone].    He is awake  Comfortable  Off vasopressors.  No fevers      MEDICATIONS  (STANDING):  atorvastatin 40 milliGRAM(s) Oral at bedtime  dextrose 5%. 1000 milliLiter(s) (100 mL/Hr) IV Continuous <Continuous>  dextrose 5%. 1000 milliLiter(s) (50 mL/Hr) IV Continuous <Continuous>  dextrose 50% Injectable 25 Gram(s) IV Push once  dextrose 50% Injectable 25 Gram(s) IV Push once  dextrose 50% Injectable 12.5 Gram(s) IV Push once  dorzolamide 2%/timolol 0.5% Ophthalmic Solution 1 Drop(s) Both EYES two times a day  glucagon  Injectable 1 milliGRAM(s) IntraMuscular once  heparin   Injectable 5000 Unit(s) SubCutaneous every 12 hours  insulin glargine Injectable (LANTUS) 5 Unit(s) SubCutaneous at bedtime  insulin lispro (ADMELOG) corrective regimen sliding scale   SubCutaneous Before meals and at bedtime  insulin lispro Injectable (ADMELOG) 3 Unit(s) SubCutaneous three times a day before meals  lactobacillus acidophilus 1 Tablet(s) Oral every 12 hours  midodrine. 10 milliGRAM(s) Oral three times a day  norepinephrine Infusion 0.05 MICROgram(s)/kG/Min (6 mL/Hr) IV Continuous <Continuous>  pantoprazole    Tablet 40 milliGRAM(s) Oral before breakfast  piperacillin/tazobactam IVPB.. 3.375 Gram(s) IV Intermittent every 12 hours  polyethylene glycol 3350 17 Gram(s) Oral daily  senna 2 Tablet(s) Oral at bedtime    MEDICATIONS  (PRN):  acetaminophen     Tablet .. 650 milliGRAM(s) Oral every 4 hours PRN Temp greater or equal to 38C (100.4F), Mild Pain (1 - 3)  aluminum hydroxide/magnesium hydroxide/simethicone Suspension 30 milliLiter(s) Oral every 4 hours PRN Dyspepsia  melatonin 3 milliGRAM(s) Oral at bedtime PRN Insomnia  ondansetron Injectable 4 milliGRAM(s) IV Push every 8 hours PRN Nausea and/or Vomiting  oxyCODONE    IR 5 milliGRAM(s) Oral every 6 hours PRN Moderate Pain (4 - 6)      Vital Signs Last 24 Hrs  T(C): 36.6 (12 Oct 2023 16:07), Max: 37.2 (11 Oct 2023 20:08)  T(F): 97.8 (12 Oct 2023 16:07), Max: 99 (11 Oct 2023 20:08)  HR: 79 (12 Oct 2023 19:30) (59 - 82)  BP: 92/54 (12 Oct 2023 19:30) (67/41 - 193/90)  BP(mean): 68 (12 Oct 2023 19:30) (50 - 129)  RR: 19 (12 Oct 2023 19:30) (6 - 24)  SpO2: 100% (12 Oct 2023 19:30) (93% - 100%)    Parameters below as of 12 Oct 2023 09:30  Patient On (Oxygen Delivery Method): room air        I&O's Summary    11 Oct 2023 07:01  -  12 Oct 2023 07:00  --------------------------------------------------------  IN: 1164.4 mL / OUT: 0 mL / NET: 1164.4 mL    12 Oct 2023 07:01  -  12 Oct 2023 19:44  --------------------------------------------------------  IN: 489 mL / OUT: 2000 mL / NET: -1511 mL        PHYSICAL EXAM:  HEENT: NC/AT; PERRLA  Neck: Soft; no tenderness  Lungs: CTA bilaterally; no wheezing.   Heart:  Abdomen:  Genital/ Rectal:  Extremities:  Neurologic:  Vascular:      LABORATORY:    CBC Full  -  ( 12 Oct 2023 06:33 )  WBC Count : 22.10 K/uL  RBC Count : 3.38 M/uL  Hemoglobin : 10.0 g/dL  Hematocrit : 31.2 %  Platelet Count - Automated : 427 K/uL  Mean Cell Volume : 92.3 fl  Mean Cell Hemoglobin : 29.6 pg  Mean Cell Hemoglobin Concentration : 32.1 gm/dL  Auto Neutrophil # : 20.20 K/uL  Auto Lymphocyte # : 1.11 K/uL  Auto Monocyte # : 0.54 K/uL  Auto Eosinophil # : 0.00 K/uL  Auto Basophil # : 0.05 K/uL  Auto Neutrophil % : 91.5 %  Auto Lymphocyte % : 5.0 %  Auto Monocyte % : 2.4 %  Auto Eosinophil % : 0.0 %  Auto Basophil % : 0.2 %      134<L>  |  99  |  46<H>  ----------------------------<  313<H>  4.1   |  19<L>  |  10.00<H>    Ca    8.5      12 Oct 2023 06:33  Phos  6.6     10-12  Mg     2.4     10-12    TPro  6.7  /  Alb  2.0<L>  /  TBili  0.7  /  DBili  x   /  AST  7<L>  /  ALT  15  /  AlkPhos  113  10-12      Assessment and Plan:    1. Left posterior thigh infected wound with necrotizing fasciitis, s/p debridement and left AKA.   2. Sepsis, likely due to infected wound.  3. ESRD on HD  4. Bilateral BKA.      Follow wound cultures.  Continue IV Zosyn q12h. Vanco trough today is 19. No further IV Vancomycin today.   Wound care daily as per surgery.              Nii Alonzo MD   [phone].

## 2023-10-12 NOTE — PROGRESS NOTE ADULT - ASSESSMENT
GENERAL DATA .     GOC.  .. 10/10/2023 full code  ICU STAY. .. 10/11/2023   COVID. ..  scv2 10/10/2023 (-)  BEST PRACTICE ISSUES.    HOB ELEVATN. .. Yes  DVT PPLX. ..  10/10/2023 [facility]    SPEECH SWALLOW RECOMMENDATIONS.    ..        DIET.   ..  10/11 renal restrn   ..  10/10/2023 npo   IV fl ...   BOLUS.   .. 10/11/2023 4a ns 500   .. 10/10/2023 4p ns 250   .. 10/10/2023 12p ns 500  .. 10/10/2023 9a ns 1000   STRESS ULCER PPLX. ..    10/10/2023 protonix 40  INFECTION PPLX. ..     ALLGY. ..   tur[place]                      WT. ..  10/10/2023 59  BMI. ..      PROCEDURES.  .. 10/11/2023 l knee disarticulation   PRESENT ON ADMISSION.    ABGS.   .     VS/ PO/IO/ VENT/ DRIPS.   10/12/2023 82 98/50   10/12/2023 7p nore .015 m/k/m     DOA C/C.   10/10/2023 Increasing weakness 2 weeks fevermalaise     INITIAL PRESENTATION.   . 10/10/2023 47yo male with weakness for 2 weeks. pt is dialysis patient M/W/F last dialysis was yesterday but pt has been feeling weak for the last 2 weeks, no cough, fever, chills, no vomiting or diarrhea, pt also has had an open wound under his left thigh that has been neglected for the last few weeks, draining pus and with redness  . Pulm critical care consulted as BP low     PMH.   . DM  . ESRD  . HD   . AV fistula  . BL BKA     HOME MEDS.   COURSE.     PROBLEMS/ASSESSMENT/RECOMMENDATIONS (A/R).    . Vanco level   .. 10/11-10/12/2023 vanco 15.6- 19.8     INFECTION.  . Skin soft tissue infection   . Necrotizing fascitis 10/10 CT   .. W 10/10-10/11-10/12/2023 w 19 - 17.7- 22  .. ct lle 10/10   .... sp remote bka   .... no cortical eroison or aggressive periosteal reaction   .... deep st ulceration along post medial margin knee with surrounding st intramuscular and perifascial st emohysema tracking cranially dd necrotizing fascitis    .. Flu ab 10/10/2023 (-)  .. rsv 10/10/2023 (-)  .. MRSA 10/11 (+)   .. bc 10/10 (-)   .. skin cs 10/10 strept agalacticae   .. 10/10 9a vanco 1g givn   .. 10/10/2023 zosyn     CARDIAC.  .. La 10/10/2023 la 1.1   .. Monitor     .. Shock  .. 10/10/2023 5:30 PM Has been given 2l fluid challenge  .. 10/10/2023 5:30 PM ra po 99%  .. 10/10/2023 will cautiously try more fluids  .. 10/12 10a nore 8 in 250   .. 10/11 midodrine 10.3   .. 10/10/2023 If continues to have map below 65 will need icu for iv vasopressors     HEMAT.  .. Hb 10/10-10/11-10/12/2023 Hb 10.3 - 9.2 - 10   .. Inr 10/10/2023 inr 129   .. Monitor     RENAL.  . ESRD  .. Na 10/10/2023 Na 136  .. Cr 10/10/2023 Cr 8.2   .. HD as guided by renal     . NECROTISZING FASCITIS   .. ct lle 10/10   .... sp remote bka   .... no cortical eroison or aggressive periosteal reaction   .... deep st ulceration along post medial margin knee with surrounding st intramuscular and perifascial st emohysema tracking cranially dd necrotizing fascitis    .. 10/11/2023 10:30 AM Surgery called as soon as radiologist reported NF   .. Pt is cleared for urgent surgery from Pul standpooint   .. 10/11 l knee disarticulation done     MAIN ISSUES   . SHOCK   . SEPSIS   . SKIN SOFT TISSUE CELLULITIS  10/10 l post knee pressure ulcer   . NECROITIZING FASCITIS 10/10 L knee ct   . l KNEE DISARTICULATION 10/11   . ESRD     OVERALL PLAN  . SSTI 10/10 started zosyn 10/10 given vanco 1g   . NECROTIZING FASCITIS 10/11/2023   . SHOCK 10/12/2023 Nore  Target MAP 65 (+)  . SP Urgent DISARTICULATION l knee 10/11   .. RTOR 1 week for AKA closure     TIME SPENT.   . Over 36 minutes aggregate care time spent on encounter; activities included   direct patient care, counseling and/or coordinating care reviewing notes, lab data/ imaging , discussion with multidisciplinary team/ patient  /family and explaining in detail risks, benefits, alternatives  of the recommendations     MICHAEL TIRADO 48 m 10/10/2023 1974 DR ELENA SCHMUTER DR MOHSEN PAHLAVAN

## 2023-10-12 NOTE — PROGRESS NOTE ADULT - SUBJECTIVE AND OBJECTIVE BOX
Patient is a 48y old  Male who presents with a chief complaint of weakness (12 Oct 2023 11:41)    Pt hypotensive overnight and today   On/off sherry  No c/o fevers/chills  Pain is controlled with current medications    MEDICATIONS  (STANDING):  atorvastatin 40 milliGRAM(s) Oral at bedtime  dextrose 5%. 1000 milliLiter(s) (100 mL/Hr) IV Continuous <Continuous>  dextrose 5%. 1000 milliLiter(s) (50 mL/Hr) IV Continuous <Continuous>  dextrose 50% Injectable 25 Gram(s) IV Push once  dextrose 50% Injectable 25 Gram(s) IV Push once  dextrose 50% Injectable 12.5 Gram(s) IV Push once  dorzolamide 2%/timolol 0.5% Ophthalmic Solution 1 Drop(s) Both EYES two times a day  glucagon  Injectable 1 milliGRAM(s) IntraMuscular once  heparin   Injectable 5000 Unit(s) SubCutaneous every 12 hours  insulin glargine Injectable (LANTUS) 5 Unit(s) SubCutaneous at bedtime  insulin lispro (ADMELOG) corrective regimen sliding scale   SubCutaneous Before meals and at bedtime  insulin lispro Injectable (ADMELOG) 3 Unit(s) SubCutaneous three times a day before meals  lactobacillus acidophilus 1 Tablet(s) Oral every 12 hours  midodrine. 10 milliGRAM(s) Oral three times a day  norepinephrine Infusion 0.05 MICROgram(s)/kG/Min (6 mL/Hr) IV Continuous <Continuous>  pantoprazole    Tablet 40 milliGRAM(s) Oral before breakfast  piperacillin/tazobactam IVPB.. 3.375 Gram(s) IV Intermittent every 12 hours  polyethylene glycol 3350 17 Gram(s) Oral daily  senna 2 Tablet(s) Oral at bedtime  vancomycin  IVPB 750 milliGRAM(s) IV Intermittent once    MEDICATIONS  (PRN):  acetaminophen     Tablet .. 650 milliGRAM(s) Oral every 4 hours PRN Temp greater or equal to 38C (100.4F), Mild Pain (1 - 3)  aluminum hydroxide/magnesium hydroxide/simethicone Suspension 30 milliLiter(s) Oral every 4 hours PRN Dyspepsia  melatonin 3 milliGRAM(s) Oral at bedtime PRN Insomnia  ondansetron Injectable 4 milliGRAM(s) IV Push every 8 hours PRN Nausea and/or Vomiting  oxyCODONE    IR 5 milliGRAM(s) Oral every 6 hours PRN Moderate Pain (4 - 6)    Allergies  No Known Drug Allergies  [place] (Rash)    Vital Signs Last 24 Hrs  T(C): 36.6 (12 Oct 2023 13:00), Max: 37.9 (11 Oct 2023 15:10)  T(F): 97.8 (12 Oct 2023 13:00), Max: 100.2 (11 Oct 2023 15:10)  HR: 71 (12 Oct 2023 13:00) (59 - 81)  BP: 121/59 (12 Oct 2023 13:00) (67/41 - 193/90)  BP(mean): 74 (12 Oct 2023 12:30) (50 - 129)  RR: 18 (12 Oct 2023 13:00) (6 - 41)  SpO2: 100% (12 Oct 2023 13:00) (94% - 100%)    Parameters below as of 12 Oct 2023 09:30  Patient On (Oxygen Delivery Method): room air      I&O's Detail    11 Oct 2023 07:01  -  12 Oct 2023 07:00  --------------------------------------------------------  IN:    IV PiggyBack: 250 mL    Oral Fluid: 570 mL    Phenylephrine: 26.4 mL    PRBCs (Packed Red Blood Cells): 318 mL  Total IN: 1164.4 mL    OUT:  Total OUT: 0 mL    Total NET: 1164.4 mL      12 Oct 2023 07:01  -  12 Oct 2023 14:07  --------------------------------------------------------  IN:    Norepinephrine: 7.2 mL  Total IN: 7.2 mL    OUT:    Other (mL): 2000 mL  Total OUT: 2000 mL    Total NET: -1992.8 mL          Physical Exam:  General: NAD, resting comfortably in bed  Pulmonary: Nonlabored breathing, no respiratory distress, CTA  Cardiovascular: NSR  Abdominal: soft, NT/ND  Extremities: L stump dressing with betadine/serosang strikethrough. Dressing changed; no active bleeding; no purulent drainage expressed. Betadine soaked dressing reapplied and wrapped with ACE      LABS:                        10.0   22.10 )-----------( 427      ( 12 Oct 2023 06:33 )             31.2     10-12    134<L>  |  99  |  46<H>  ----------------------------<  313<H>  4.1   |  19<L>  |  10.00<H>    Ca    8.5      12 Oct 2023 06:33  Phos  6.6     10-12  Mg     2.4     10-12    TPro  6.7  /  Alb  2.0<L>  /  TBili  0.7  /  DBili  x   /  AST  7<L>  /  ALT  15  /  AlkPhos  113  10-12    PT/INR - ( 11 Oct 2023 06:50 )   PT: 15.3 sec;   INR: 1.32 ratio           CAPILLARY BLOOD GLUCOSE  POCT Blood Glucose.: 163 mg/dL (12 Oct 2023 11:32)  POCT Blood Glucose.: 343 mg/dL (12 Oct 2023 08:17)  POCT Blood Glucose.: 255 mg/dL (11 Oct 2023 21:00)  POCT Blood Glucose.: 209 mg/dL (11 Oct 2023 18:03)  POCT Blood Glucose.: 210 mg/dL (11 Oct 2023 17:01)  POCT Blood Glucose.: 226 mg/dL (11 Oct 2023 15:16)    Radiology and Additional Studies:

## 2023-10-12 NOTE — PROGRESS NOTE ADULT - ASSESSMENT
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 Sin/Dr. Singh) Presents to ED [facility] 10/10 with weakness for 2 weeks. 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 Wound care consult and ID consults are requested . (10 Oct 2023 12:52)      esrd on hd for sat   Excess fluids and waste products will be removed from your blood; your electrolytes will be balanced; your blood pressure will be controlled.    Admit for septic workup and ID evaluation,send blood and urine cx,serial lactate levels,monitor vitals closley,ivfs hydration,monitor urine output and renal profile,iv abx as per id cons  vancomycin  IVPB 500 milliGRAM(s) IV Intermittent once    BP monitoring,continue current antihypertensive meds, low salt diet,followup with PMD in 1-2 weeks      ANEMIA PLAN:  Anemia of chronic disease:  Well controlled by Aranesp  H and H subtherapeutic .  We will continue Aranesp aiming for a HCT of 32-36 %.   We will monitor Iron stores, B12 and RBC folate .

## 2023-10-12 NOTE — PROGRESS NOTE ADULT - ASSESSMENT
49 yo male with multiple comorbidities and bilateral BKA was consulted for necrotizing fascitis  Taken to OR for urgent left knee disarticulation    POD#1  Hypotension requiring vasopressors    Maintain ICU care  Follow WBC  Cont abx  F/U OR cultures  Wean pressors; midodrine added  Daily dressing changes by vascular team  RTOR next week for AKA closure

## 2023-10-12 NOTE — PROGRESS NOTE ADULT - SUBJECTIVE AND OBJECTIVE BOX
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 Sni/Dr. Singh) Presents to ED [facility] 10/10 with weakness for 2 weeks. 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 Wound care consult and ID consults are requested . (10 Oct 2023 12:52)    PAST MEDICAL & SURGICAL HISTORY:  ESRD on dialysis      H/O hypotension      Diabetes mellitus      Leg wound, left      Steal syndrome dialysis vascular access      Gangrene of finger of right hand      PVD (peripheral vascular disease)      Anemia of chronic disease      Constipation      HLD (hyperlipidemia)      S/P BKA (below knee amputation) bilateral      AV fistula          MEDICATIONS  (STANDING):  atorvastatin 40 milliGRAM(s) Oral at bedtime  dextrose 5%. 1000 milliLiter(s) (100 mL/Hr) IV Continuous <Continuous>  dextrose 5%. 1000 milliLiter(s) (50 mL/Hr) IV Continuous <Continuous>  dextrose 50% Injectable 25 Gram(s) IV Push once  dextrose 50% Injectable 25 Gram(s) IV Push once  dextrose 50% Injectable 12.5 Gram(s) IV Push once  dorzolamide 2%/timolol 0.5% Ophthalmic Solution 1 Drop(s) Both EYES two times a day  glucagon  Injectable 1 milliGRAM(s) IntraMuscular once  heparin   Injectable 5000 Unit(s) SubCutaneous every 12 hours  insulin glargine Injectable (LANTUS) 5 Unit(s) SubCutaneous at bedtime  insulin lispro (ADMELOG) corrective regimen sliding scale   SubCutaneous Before meals and at bedtime  insulin lispro Injectable (ADMELOG) 3 Unit(s) SubCutaneous three times a day before meals  lactobacillus acidophilus 1 Tablet(s) Oral every 12 hours  midodrine. 10 milliGRAM(s) Oral three times a day  pantoprazole    Tablet 40 milliGRAM(s) Oral before breakfast  piperacillin/tazobactam IVPB.. 3.375 Gram(s) IV Intermittent every 12 hours  polyethylene glycol 3350 17 Gram(s) Oral daily  senna 2 Tablet(s) Oral at bedtime  vancomycin  IVPB 750 milliGRAM(s) IV Intermittent once    MEDICATIONS  (PRN):  acetaminophen     Tablet .. 650 milliGRAM(s) Oral every 4 hours PRN Temp greater or equal to 38C (100.4F), Mild Pain (1 - 3)  aluminum hydroxide/magnesium hydroxide/simethicone Suspension 30 milliLiter(s) Oral every 4 hours PRN Dyspepsia  melatonin 3 milliGRAM(s) Oral at bedtime PRN Insomnia  ondansetron Injectable 4 milliGRAM(s) IV Push every 8 hours PRN Nausea and/or Vomiting  oxyCODONE    IR 5 milliGRAM(s) Oral every 6 hours PRN Moderate Pain (4 - 6)      OBJECTIVE    T(C): 36.6 (10-12-23 @ 09:30), Max: 37.9 (10-11-23 @ 15:10)  HR: 62 (10-12-23 @ 09:30) (59 - 81)  BP: 105/58 (10-12-23 @ 09:30) (77/40 - 163/68)  RR: 18 (10-12-23 @ 09:30) (6 - 41)  SpO2: 100% (10-12-23 @ 09:30) (95% - 100%)  Wt(kg): --  I&O's Summary    11 Oct 2023 07:01  -  12 Oct 2023 07:00  --------------------------------------------------------  IN: 1164.4 mL / OUT: 0 mL / NET: 1164.4 mL          REVIEW OF SYSTEMS:  CONSTITUTIONAL: No fever, weight loss, or fatigue  EYES: No eye pain, visual disturbances, or discharge  ENMT:   No sinus or throat pain  NECK: No pain or stiffness  RESPIRATORY: No cough, wheezing, chills or hemoptysis; No shortness of breath  CARDIOVASCULAR: No chest pain, palpitations, dizziness, or leg swelling  GASTROINTESTINAL: No abdominal pain. No nausea, vomiting; No diarrhea or constipation. No melena or hematochezia.  GENITOURINARY: No dysuria, frequency, hematuria, or incontinence  NEUROLOGICAL: No headaches, memory loss, loss of strength, numbness, or tremors  SKIN: No itching, burning, rashes, or lesions   MUSCULOSKELETAL: No joint pain or swelling; No muscle, back, or extremity pain    PHYSICAL EXAM:  Appearance: NAD. VS past 24 hrs -as above   HEENT:   Moist oral mucosa. Conjunctiva clear b/l.   Neck : supple  Respiratory: Lungs CTAB.  Gastrointestinal:  Soft, nontender. No rebound. No rigidity. BS present	  Cardiovascular: RRR ,S1S2 present  Neurologic: Non-focal. Moving all extremities.  Extremities: No edema. No erythema. No calf tenderness.  Skin: No rashes, No ecchymoses, No cyanosis.	  wounds ,skin lesions-See skin assesment flow sheet   LABS:                        10.0   22.10 )-----------( 427      ( 12 Oct 2023 06:33 )             31.2     10-12    134<L>  |  99  |  46<H>  ----------------------------<  313<H>  4.1   |  19<L>  |  10.00<H>    Ca    8.5      12 Oct 2023 06:33  Phos  6.6     10-12  Mg     2.4     10-12    TPro  6.7  /  Alb  2.0<L>  /  TBili  0.7  /  DBili  x   /  AST  7<L>  /  ALT  15  /  AlkPhos  113  10-12    CAPILLARY BLOOD GLUCOSE      POCT Blood Glucose.: 343 mg/dL (12 Oct 2023 08:17)  POCT Blood Glucose.: 255 mg/dL (11 Oct 2023 21:00)  POCT Blood Glucose.: 209 mg/dL (11 Oct 2023 18:03)  POCT Blood Glucose.: 210 mg/dL (11 Oct 2023 17:01)  POCT Blood Glucose.: 226 mg/dL (11 Oct 2023 15:16)  POCT Blood Glucose.: 230 mg/dL (11 Oct 2023 13:10)  POCT Blood Glucose.: 174 mg/dL (11 Oct 2023 11:17)    PT/INR - ( 11 Oct 2023 06:50 )   PT: 15.3 sec;   INR: 1.32 ratio         PTT - ( 10 Oct 2023 11:15 )  PTT:29.2 sec  Urinalysis Basic - ( 12 Oct 2023 06:33 )    Color: x / Appearance: x / SG: x / pH: x  Gluc: 313 mg/dL / Ketone: x  / Bili: x / Urobili: x   Blood: x / Protein: x / Nitrite: x   Leuk Esterase: x / RBC: x / WBC x   Sq Epi: x / Non Sq Epi: x / Bacteria: x        Culture - Blood (collected 10 Oct 2023 12:00)  Source: .Blood Blood-Peripheral  Preliminary Report (11 Oct 2023 19:01):    No growth at 24 hours    Culture - Blood (collected 10 Oct 2023 11:15)  Source: .Blood Blood-Peripheral  Preliminary Report (11 Oct 2023 15:07):    No growth at 24 hours      RADIOLOGY & ADDITIONAL TESTS:   reviewed elctronically  ASSESSMENT/PLAN: 	     PROGRESS NOTE  Patient is a 48y old  Male who presents with a chief complaint of weakness (12 Oct 2023 08:06)  Chart and available morning labs /imaging are reviewed electronically , urgent issues addressed . More information  is being added upon completion of rounds , when more information is collected and management discussed with consultants , medical staff and social service/case management on the floor   OVERNIGHT -seen in icu during HD   No new issues reported by medical staff . All above noted Patient is resting in a bed comfortably  .No distress noted   INTERVAL HPI/OVERNIGHT EVENTS:  - P/w worsening LLE weakness and L thigh wound w/ purulent drainage x 2 weeks  - HypoTN to SBP 60s > 90s s/p 1.75 IVF and midodrine, txf to floor  - Floor course: started on Zosyn c/f septic shock w/ WBC 19k, developed recurrent hypoTN and febrile Tmax 101.3, vascular c/s for emergent L AKA  - Postop: refractory hypoTN requiring pressors, txf to ICU  HPI:  47 yo malewith PMH of DM2, b/l BKA, ESRD (on HD MWF) ,infection  right third finger 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 Sin/Dr. Singh) Presents to ED [facility] [place] 10/10 with weakness for 2 weeks. 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 Wound care consult and ID consults are requested . (10 Oct 2023 12:52)    PAST MEDICAL & SURGICAL HISTORY:  ESRD on dialysis      H/O hypotension      Diabetes mellitus      Leg wound, left      Steal syndrome dialysis vascular access      Gangrene of finger of right hand      PVD (peripheral vascular disease)      Anemia of chronic disease      Constipation      HLD (hyperlipidemia)      S/P BKA (below knee amputation) bilateral      AV fistula          MEDICATIONS  (STANDING):  atorvastatin 40 milliGRAM(s) Oral at bedtime  dextrose 5%. 1000 milliLiter(s) (100 mL/Hr) IV Continuous <Continuous>  dextrose 5%. 1000 milliLiter(s) (50 mL/Hr) IV Continuous <Continuous>  dextrose 50% Injectable 25 Gram(s) IV Push once  dextrose 50% Injectable 25 Gram(s) IV Push once  dextrose 50% Injectable 12.5 Gram(s) IV Push once  dorzolamide 2%/timolol 0.5% Ophthalmic Solution 1 Drop(s) Both EYES two times a day  glucagon  Injectable 1 milliGRAM(s) IntraMuscular once  heparin   Injectable 5000 Unit(s) SubCutaneous every 12 hours  insulin glargine Injectable (LANTUS) 5 Unit(s) SubCutaneous at bedtime  insulin lispro (ADMELOG) corrective regimen sliding scale   SubCutaneous Before meals and at bedtime  insulin lispro Injectable (ADMELOG) 3 Unit(s) SubCutaneous three times a day before meals  lactobacillus acidophilus 1 Tablet(s) Oral every 12 hours  midodrine. 10 milliGRAM(s) Oral three times a day  pantoprazole    Tablet 40 milliGRAM(s) Oral before breakfast  piperacillin/tazobactam IVPB.. 3.375 Gram(s) IV Intermittent every 12 hours  polyethylene glycol 3350 17 Gram(s) Oral daily  senna 2 Tablet(s) Oral at bedtime  vancomycin  IVPB 750 milliGRAM(s) IV Intermittent once    MEDICATIONS  (PRN):  acetaminophen     Tablet .. 650 milliGRAM(s) Oral every 4 hours PRN Temp greater or equal to 38C (100.4F), Mild Pain (1 - 3)  aluminum hydroxide/magnesium hydroxide/simethicone Suspension 30 milliLiter(s) Oral every 4 hours PRN Dyspepsia  melatonin 3 milliGRAM(s) Oral at bedtime PRN Insomnia  ondansetron Injectable 4 milliGRAM(s) IV Push every 8 hours PRN Nausea and/or Vomiting  oxyCODONE    IR 5 milliGRAM(s) Oral every 6 hours PRN Moderate Pain (4 - 6)      OBJECTIVE    T(C): 36.6 (10-12-23 @ 09:30), Max: 37.9 (10-11-23 @ 15:10)  HR: 62 (10-12-23 @ 09:30) (59 - 81)  BP: 105/58 (10-12-23 @ 09:30) (77/40 - 163/68)  RR: 18 (10-12-23 @ 09:30) (6 - 41)  SpO2: 100% (10-12-23 @ 09:30) (95% - 100%)  Wt(kg): --  I&O's Summary    11 Oct 2023 07:01  -  12 Oct 2023 07:00  --------------------------------------------------------  IN: 1164.4 mL / OUT: 0 mL / NET: 1164.4 mL          REVIEW OF SYSTEMS:  CONSTITUTIONAL: No fever, weight loss, or fatigue  EYES: No eye pain, visual disturbances, or discharge  ENMT:   No sinus or throat pain  NECK: No pain or stiffness  RESPIRATORY: No cough, wheezing, chills or hemoptysis; No shortness of breath  CARDIOVASCULAR: No chest pain, palpitations, dizziness, or leg swelling  GASTROINTESTINAL: No abdominal pain. No nausea, vomiting; No diarrhea or constipation. No melena or hematochezia.  GENITOURINARY: No dysuria, frequency, hematuria, or incontinence  NEUROLOGICAL: No headaches, memory loss, loss of strength, numbness, or tremors  SKIN: No itching, burning, rashes, or lesions   PHYSICAL EXAM:  Appearance: NAD. VS past 24 hrs -as above   HEENT:   Moist oral mucosa. Conjunctiva clear b/l.   Neck : supple  Respiratory: Lungs CTAB.  Gastrointestinal:  Soft, nontender. No rebound. No rigidity. BS present	  Cardiovascular: RRR ,S1S2 present  Neurologic: Non-focal. Moving all extremities.  Extremities: Extremities: L stump dressing with betadine/serosang strikethrough. Dressing changed; no active bleeding; no purulent drainage expressed. Betadine soaked dressing reapplied and wrapped with ACE  wounds ,skin lesions-See skin assesment flow sheet   LABS:                        10.0   22.10 )-----------( 427      ( 12 Oct 2023 06:33 )             31.2     10-12    134<L>  |  99  |  46<H>  ----------------------------<  313<H>  4.1   |  19<L>  |  10.00<H>    Ca    8.5      12 Oct 2023 06:33  Phos  6.6     10-12  Mg     2.4     10-12    TPro  6.7  /  Alb  2.0<L>  /  TBili  0.7  /  DBili  x   /  AST  7<L>  /  ALT  15  /  AlkPhos  113  10-12    CAPILLARY BLOOD GLUCOSE      POCT Blood Glucose.: 343 mg/dL (12 Oct 2023 08:17)  POCT Blood Glucose.: 255 mg/dL (11 Oct 2023 21:00)  POCT Blood Glucose.: 209 mg/dL (11 Oct 2023 18:03)  POCT Blood Glucose.: 210 mg/dL (11 Oct 2023 17:01)  POCT Blood Glucose.: 226 mg/dL (11 Oct 2023 15:16)  POCT Blood Glucose.: 230 mg/dL (11 Oct 2023 13:10)  POCT Blood Glucose.: 174 mg/dL (11 Oct 2023 11:17)    PT/INR - ( 11 Oct 2023 06:50 )   PT: 15.3 sec;   INR: 1.32 ratio         PTT - ( 10 Oct 2023 11:15 )  PTT:29.2 sec  Urinalysis Basic - ( 12 Oct 2023 06:33 )    Color: x / Appearance: x / SG: x / pH: x  Gluc: 313 mg/dL / Ketone: x  / Bili: x / Urobili: x   Blood: x / Protein: x / Nitrite: x   Leuk Esterase: x / RBC: x / WBC x   Sq Epi: x / Non Sq Epi: x / Bacteria: x        Culture - Blood (collected 10 Oct 2023 12:00)  Source: .Blood Blood-Peripheral  Preliminary Report (11 Oct 2023 19:01):    No growth at 24 hours    Culture - Blood (collected 10 Oct 2023 11:15)  Source: .Blood Blood-Peripheral  Preliminary Report (11 Oct 2023 15:07):    No growth at 24 hours      RADIOLOGY & ADDITIONAL TESTS:   reviewed elctronically  ASSESSMENT/PLAN:

## 2023-10-12 NOTE — PHARMACOTHERAPY INTERVENTION NOTE - COMMENTS
Patient is a 48y M p/w sepsis likely due to infected wound, now s/p debridement in OR. Patient with PMH ESRD on HD MWF as outpatient, however received HD today due to missing session on 10/11/2023. S/p vancomycin 1 gram x1 on 10/10 and 500 mg x 1 on 10/11 random level this AM = 19.8 ug/mL. Discussed with Dr. Willis, will give vancomycin 750 mg IVPB post dialysis and recommended trough level tomorrow AM due to pending HD timeline. Order entered per discussion.

## 2023-10-12 NOTE — PROGRESS NOTE ADULT - ASSESSMENT
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 Sin/Dr. Singh) Presents to ED [facility] 10/10 with weakness for 2 weeks. 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 Wound care consult and ID consults are requested .

## 2023-10-12 NOTE — PROGRESS NOTE ADULT - SUBJECTIVE AND OBJECTIVE BOX
Date/Time Patient Seen:  		  Referring MD:   Data Reviewed	       Patient is a 48y old  Male who presents with a chief complaint of weakness (11 Oct 2023 20:39)      Subjective/HPI     PAST MEDICAL & SURGICAL HISTORY:  ESRD on dialysis    H/O hypotension    Diabetes mellitus    Leg wound, left    Steal syndrome dialysis vascular access    Gangrene of finger of right hand    PVD (peripheral vascular disease)    Anemia of chronic disease    Constipation    HLD (hyperlipidemia)    S/P BKA (below knee amputation) bilateral    AV fistula          Medication list         MEDICATIONS  (STANDING):  atorvastatin 40 milliGRAM(s) Oral at bedtime  dextrose 5%. 1000 milliLiter(s) (100 mL/Hr) IV Continuous <Continuous>  dextrose 5%. 1000 milliLiter(s) (50 mL/Hr) IV Continuous <Continuous>  dextrose 50% Injectable 25 Gram(s) IV Push once  dextrose 50% Injectable 12.5 Gram(s) IV Push once  dextrose 50% Injectable 25 Gram(s) IV Push once  dorzolamide 2%/timolol 0.5% Ophthalmic Solution 1 Drop(s) Both EYES two times a day  glucagon  Injectable 1 milliGRAM(s) IntraMuscular once  heparin   Injectable 5000 Unit(s) SubCutaneous every 12 hours  insulin glargine Injectable (LANTUS) 4 Unit(s) SubCutaneous at bedtime  insulin lispro (ADMELOG) corrective regimen sliding scale   SubCutaneous Before meals and at bedtime  lactobacillus acidophilus 1 Tablet(s) Oral every 12 hours  midodrine. 10 milliGRAM(s) Oral three times a day  pantoprazole    Tablet 40 milliGRAM(s) Oral before breakfast  phenylephrine    Infusion 0.8 MICROgram(s)/kG/Min (17.7 mL/Hr) IV Continuous <Continuous>  piperacillin/tazobactam IVPB.. 3.375 Gram(s) IV Intermittent every 12 hours  senna 2 Tablet(s) Oral at bedtime    MEDICATIONS  (PRN):  acetaminophen     Tablet .. 650 milliGRAM(s) Oral every 4 hours PRN Temp greater or equal to 38C (100.4F), Mild Pain (1 - 3)  aluminum hydroxide/magnesium hydroxide/simethicone Suspension 30 milliLiter(s) Oral every 4 hours PRN Dyspepsia  dextrose Oral Gel 15 Gram(s) Oral once PRN Blood Glucose LESS THAN 70 milliGRAM(s)/deciliter  melatonin 3 milliGRAM(s) Oral at bedtime PRN Insomnia  ondansetron Injectable 4 milliGRAM(s) IV Push every 8 hours PRN Nausea and/or Vomiting         Vitals log        ICU Vital Signs Last 24 Hrs  T(C): 36.4 (12 Oct 2023 04:21), Max: 38.4 (11 Oct 2023 06:21)  T(F): 97.5 (12 Oct 2023 04:21), Max: 101.1 (11 Oct 2023 06:21)  HR: 59 (12 Oct 2023 05:00) (59 - 81)  BP: 102/56 (12 Oct 2023 05:00) (77/40 - 163/68)  BP(mean): 77 (12 Oct 2023 05:00) (69 - 99)  ABP: --  ABP(mean): --  RR: 0 (12 Oct 2023 05:00) (0 - 41)  SpO2: 100% (12 Oct 2023 05:00) (95% - 100%)    O2 Parameters below as of 11 Oct 2023 20:00  Patient On (Oxygen Delivery Method): room air                 Input and Output:  I&O's Detail    11 Oct 2023 07:01  -  12 Oct 2023 05:28  --------------------------------------------------------  IN:    IV PiggyBack: 250 mL    Oral Fluid: 570 mL    Phenylephrine: 26.4 mL    PRBCs (Packed Red Blood Cells): 318 mL  Total IN: 1164.4 mL    OUT:  Total OUT: 0 mL    Total NET: 1164.4 mL          Lab Data                        11.8   21.99 )-----------( 456      ( 11 Oct 2023 18:40 )             36.7     10-11    136  |  100  |  38<H>  ----------------------------<  219<H>  3.8   |  20<L>  |  9.40<H>    Ca    8.8      11 Oct 2023 18:40  Phos  4.3     10-11  Mg     2.5     10-11    TPro  7.8  /  Alb  2.1<L>  /  TBili  0.9  /  DBili  x   /  AST  12<L>  /  ALT  18  /  AlkPhos  136<H>  10-11            Review of Systems	      Objective     Physical Examination    heart s1s2  lung dc BS      Pertinent Lab findings & Imaging      Barak:  NO   Adequate UO     I&O's Detail    11 Oct 2023 07:01  -  12 Oct 2023 05:28  --------------------------------------------------------  IN:    IV PiggyBack: 250 mL    Oral Fluid: 570 mL    Phenylephrine: 26.4 mL    PRBCs (Packed Red Blood Cells): 318 mL  Total IN: 1164.4 mL    OUT:  Total OUT: 0 mL    Total NET: 1164.4 mL               Discussed with:     Cultures:	        Radiology

## 2023-10-12 NOTE — PROGRESS NOTE ADULT - SUBJECTIVE AND OBJECTIVE BOX
CHIEF COMPLAINT/ REASON FOR VISIT  .. Patient was seen to address the  issue listed under PROBLEM LIST which is located toward bottom of this note     MELVI RODRIGUEZ    [facility] 07A    Allergies    No Known Drug Allergies  [place] (Rash)    Intolerances        PAST MEDICAL & SURGICAL HISTORY:  ESRD on dialysis      H/O hypotension      Diabetes mellitus      Leg wound, left      Steal syndrome dialysis vascular access      Gangrene of finger of right hand      PVD (peripheral vascular disease)      Anemia of chronic disease      Constipation      HLD (hyperlipidemia)      S/P BKA (below knee amputation) bilateral      AV fistula          FAMILY HISTORY:      Home Medications:  Admelog 100 units/mL injectable solution: 4 unit(s) subcutaneously 3 times a day (10 Oct 2023 14:32)  amLODIPine 5 mg oral tablet: 1 tab(s) orally once a day (10 Oct 2023 14:32)  atorvastatin 40 mg oral tablet: 1 tab(s) orally once a day (10 Oct 2023 14:32)  insulin glargine 100 units/mL subcutaneous solution: 4 unit(s) subcutaneous once a day (at bedtime) (10 Oct 2023 14:32)  senna (sennosides) 8.6 mg oral tablet: 2 tab(s) orally once a day (at bedtime) (10 Oct 2023 14:32)  Velphoro 500 mg oral tablet, chewable: 3 tab(s) chewed 3 times a day (10 Oct 2023 14:32)      MEDICATIONS  (STANDING):  atorvastatin 40 milliGRAM(s) Oral at bedtime  dextrose 5%. 1000 milliLiter(s) (50 mL/Hr) IV Continuous <Continuous>  dextrose 5%. 1000 milliLiter(s) (100 mL/Hr) IV Continuous <Continuous>  dextrose 50% Injectable 25 Gram(s) IV Push once  dextrose 50% Injectable 12.5 Gram(s) IV Push once  dextrose 50% Injectable 25 Gram(s) IV Push once  dorzolamide 2%/timolol 0.5% Ophthalmic Solution 1 Drop(s) Both EYES two times a day  glucagon  Injectable 1 milliGRAM(s) IntraMuscular once  heparin   Injectable 5000 Unit(s) SubCutaneous every 12 hours  insulin glargine Injectable (LANTUS) 5 Unit(s) SubCutaneous at bedtime  insulin lispro (ADMELOG) corrective regimen sliding scale   SubCutaneous Before meals and at bedtime  insulin lispro Injectable (ADMELOG) 3 Unit(s) SubCutaneous three times a day before meals  lactobacillus acidophilus 1 Tablet(s) Oral every 12 hours  midodrine. 10 milliGRAM(s) Oral three times a day  pantoprazole    Tablet 40 milliGRAM(s) Oral before breakfast  phenylephrine    Infusion 0.8 MICROgram(s)/kG/Min (17.7 mL/Hr) IV Continuous <Continuous>  piperacillin/tazobactam IVPB.. 3.375 Gram(s) IV Intermittent every 12 hours  senna 2 Tablet(s) Oral at bedtime    MEDICATIONS  (PRN):  acetaminophen     Tablet .. 650 milliGRAM(s) Oral every 4 hours PRN Temp greater or equal to 38C (100.4F), Mild Pain (1 - 3)  aluminum hydroxide/magnesium hydroxide/simethicone Suspension 30 milliLiter(s) Oral every 4 hours PRN Dyspepsia  melatonin 3 milliGRAM(s) Oral at bedtime PRN Insomnia  ondansetron Injectable 4 milliGRAM(s) IV Push every 8 hours PRN Nausea and/or Vomiting      Diet, Renal Restrictions:   For patients receiving Renal Replacement - No Protein Restr, No Conc K, No Conc Phos, Low Sodium (10-11-23 @ 18:21) [Active]          Vital Signs Last 24 Hrs  T(C): 36.7 (12 Oct 2023 07:42), Max: 37.9 (11 Oct 2023 15:10)  T(F): 98 (12 Oct 2023 07:42), Max: 100.2 (11 Oct 2023 15:10)  HR: 63 (12 Oct 2023 07:00) (59 - 81)  BP: 94/54 (12 Oct 2023 07:00) (77/40 - 163/68)  BP(mean): 72 (12 Oct 2023 07:00) (69 - 99)  RR: 17 (12 Oct 2023 07:00) (6 - 41)  SpO2: 100% (12 Oct 2023 07:00) (95% - 100%)    Parameters below as of 11 Oct 2023 20:00  Patient On (Oxygen Delivery Method): room air          10-11-23 @ 07:01  -  10-12-23 @ 07:00  --------------------------------------------------------  IN: 1164.4 mL / OUT: 0 mL / NET: 1164.4 mL              LABS:                        10.0   22.10 )-----------( 427      ( 12 Oct 2023 06:33 )             31.2     10-12    134<L>  |  99  |  46<H>  ----------------------------<  313<H>  4.1   |  19<L>  |  10.00<H>    Ca    8.5      12 Oct 2023 06:33  Phos  6.6     10-12  Mg     2.4     10-12    TPro  6.7  /  Alb  2.0<L>  /  TBili  0.7  /  DBili  x   /  AST  7<L>  /  ALT  15  /  AlkPhos  113  10-12    PT/INR - ( 11 Oct 2023 06:50 )   PT: 15.3 sec;   INR: 1.32 ratio         PTT - ( 10 Oct 2023 11:15 )  PTT:29.2 sec  Urinalysis Basic - ( 12 Oct 2023 06:33 )    Color: x / Appearance: x / SG: x / pH: x  Gluc: 313 mg/dL / Ketone: x  / Bili: x / Urobili: x   Blood: x / Protein: x / Nitrite: x   Leuk Esterase: x / RBC: x / WBC x   Sq Epi: x / Non Sq Epi: x / Bacteria: x            WBC:  WBC Count: 22.10 K/uL (10-12 @ 06:33)  WBC Count: 21.99 K/uL (10-11 @ 18:40)  WBC Count: 17.79 K/uL (10-11 @ 06:50)  WBC Count: 19.60 K/uL (10-10 @ 11:15)      MICROBIOLOGY:  RECENT CULTURES:  10-10 .Blood Blood-Peripheral XXXX XXXX   No growth at 24 hours    10-10 .Blood Blood-Peripheral XXXX XXXX   No growth at 24 hours                PT/INR - ( 11 Oct 2023 06:50 )   PT: 15.3 sec;   INR: 1.32 ratio         PTT - ( 10 Oct 2023 11:15 )  PTT:29.2 sec    Sodium:  Sodium: 134 mmol/L (10-12 @ 06:33)  Sodium: 136 mmol/L (10-11 @ 18:40)  Sodium: 135 mmol/L (10-11 @ 10:17)  Sodium: 136 mmol/L (10-11 @ 06:50)  Sodium: 136 mmol/L (10-10 @ 11:15)      10.00 mg/dL 10-12 @ 06:33  9.40 mg/dL 10-11 @ 18:40  9.10 mg/dL 10-11 @ 10:17  8.90 mg/dL 10-11 @ 06:50  8.20 mg/dL 10-10 @ 11:15      Hemoglobin:  Hemoglobin: 10.0 g/dL (10-12 @ 06:33)  Hemoglobin: 11.8 g/dL (10-11 @ 18:40)  Hemoglobin: 9.2 g/dL (10-11 @ 06:50)  Hemoglobin: 10.3 g/dL (10-10 @ 11:15)      Platelets: Platelet Count - Automated: 427 K/uL (10-12 @ 06:33)  Platelet Count - Automated: 456 K/uL (10-11 @ 18:40)  Platelet Count - Automated: 386 K/uL (10-11 @ 06:50)  Platelet Count - Automated: 395 K/uL (10-10 @ 11:15)      LIVER FUNCTIONS - ( 12 Oct 2023 06:33 )  Alb: 2.0 g/dL / Pro: 6.7 g/dL / ALK PHOS: 113 U/L / ALT: 15 U/L / AST: 7 U/L / GGT: x             Urinalysis Basic - ( 12 Oct 2023 06:33 )    Color: x / Appearance: x / SG: x / pH: x  Gluc: 313 mg/dL / Ketone: x  / Bili: x / Urobili: x   Blood: x / Protein: x / Nitrite: x   Leuk Esterase: x / RBC: x / WBC x   Sq Epi: x / Non Sq Epi: x / Bacteria: x        RADIOLOGY & ADDITIONAL STUDIES:      MICROBIOLOGY:  RECENT CULTURES:  10-10 .Blood Blood-Peripheral XXXX XXXX   No growth at 24 hours    10-10 .Blood Blood-Peripheral XXXX XXXX   No growth at 24 hours

## 2023-10-12 NOTE — PROGRESS NOTE ADULT - ASSESSMENT
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 Sin/Dr. Singh) Presents to ED [facility] 10/10 with weakness for 2 weeks    anemia  esrd  weakness  DM  BKA  PAD  PVD  Pain    POD 1  vs noted  on ABX  labs reviewed  ICU care in progress    full code  lives with family at home - in [facility] as per renal  pain relief  oral hygiene  skin care  wound care  assist with needs

## 2023-10-12 NOTE — PROGRESS NOTE ADULT - SUBJECTIVE AND OBJECTIVE BOX
INTERVAL HPI/OVERNIGHT EVENTS:  - P/w worsening LLE weakness and L thigh wound w/ purulent drainage x 2 weeks  - HypoTN to SBP 60s > 90s s/p 1.75 IVF and midodrine, txf to floor  - Floor course: started on Zosyn c/f septic shock w/ WBC 19k, developed recurrent hypoTN and febrile Tmax 101.3, vascular c/s for emergent L AKA  - Postop: refractory hypoTN requiring pressors, txf to ICU    SUBJECTIVE: Patient seen and examined at bedside. Feels good and comfortable after surgery.    ROS:  CV: Denies chest pain  Resp: Denies SOB  GI: Denies abdominal pain, constipation, diarrhea, nausea, vomiting  : Denies dysuria  ID: Denies fevers, chills  MSK: Denies joint pain, +mild LLE pain well controlled w/ medications    OBJECTIVE:    VITAL SIGNS:  ICU Vital Signs Last 24 Hrs  T(C): 36.6 (12 Oct 2023 09:30), Max: 37.9 (11 Oct 2023 15:10)  T(F): 97.9 (12 Oct 2023 09:30), Max: 100.2 (11 Oct 2023 15:10)  HR: 63 (12 Oct 2023 11:30) (59 - 81)  BP: 125/59 (12 Oct 2023 11:30) (67/41 - 193/90)  BP(mean): 86 (12 Oct 2023 11:30) (50 - 129)  ABP: --  ABP(mean): --  RR: 19 (12 Oct 2023 11:30) (6 - 41)  SpO2: 100% (12 Oct 2023 11:30) (94% - 100%)    O2 Parameters below as of 12 Oct 2023 09:30  Patient On (Oxygen Delivery Method): room air              10-11 @ 07:01  -  10-12 @ 07:00  --------------------------------------------------------  IN: 1164.4 mL / OUT: 0 mL / NET: 1164.4 mL      CAPILLARY BLOOD GLUCOSE      POCT Blood Glucose.: 163 mg/dL (12 Oct 2023 11:32)      PHYSICAL EXAM:  General: NAD, comfortable  HEENT: NCAT, clear conjunctiva, no scleral icterus  Respiratory: CTA b/l, no wheezing, rhonchi, rales  Cardiovascular: RRR, normal S1S2, no M/R/G  Abdomen: soft, NT/ND, bowel sounds present  Extremities: LLE dressings C/D/I, +mild TTP, well-healed s/p R BKA and R hand 3rd digit amputation changes  Neurology: awake and alert    MEDICATIONS:  MEDICATIONS  (STANDING):  atorvastatin 40 milliGRAM(s) Oral at bedtime  dextrose 5%. 1000 milliLiter(s) (100 mL/Hr) IV Continuous <Continuous>  dextrose 5%. 1000 milliLiter(s) (50 mL/Hr) IV Continuous <Continuous>  dextrose 50% Injectable 25 Gram(s) IV Push once  dextrose 50% Injectable 12.5 Gram(s) IV Push once  dextrose 50% Injectable 25 Gram(s) IV Push once  dorzolamide 2%/timolol 0.5% Ophthalmic Solution 1 Drop(s) Both EYES two times a day  glucagon  Injectable 1 milliGRAM(s) IntraMuscular once  heparin   Injectable 5000 Unit(s) SubCutaneous every 12 hours  insulin glargine Injectable (LANTUS) 5 Unit(s) SubCutaneous at bedtime  insulin lispro (ADMELOG) corrective regimen sliding scale   SubCutaneous Before meals and at bedtime  insulin lispro Injectable (ADMELOG) 3 Unit(s) SubCutaneous three times a day before meals  lactobacillus acidophilus 1 Tablet(s) Oral every 12 hours  midodrine. 10 milliGRAM(s) Oral three times a day  norepinephrine Infusion 0.05 MICROgram(s)/kG/Min (6 mL/Hr) IV Continuous <Continuous>  pantoprazole    Tablet 40 milliGRAM(s) Oral before breakfast  piperacillin/tazobactam IVPB.. 3.375 Gram(s) IV Intermittent every 12 hours  polyethylene glycol 3350 17 Gram(s) Oral daily  senna 2 Tablet(s) Oral at bedtime  vancomycin  IVPB 750 milliGRAM(s) IV Intermittent once    MEDICATIONS  (PRN):  acetaminophen     Tablet .. 650 milliGRAM(s) Oral every 4 hours PRN Temp greater or equal to 38C (100.4F), Mild Pain (1 - 3)  aluminum hydroxide/magnesium hydroxide/simethicone Suspension 30 milliLiter(s) Oral every 4 hours PRN Dyspepsia  melatonin 3 milliGRAM(s) Oral at bedtime PRN Insomnia  ondansetron Injectable 4 milliGRAM(s) IV Push every 8 hours PRN Nausea and/or Vomiting  oxyCODONE    IR 5 milliGRAM(s) Oral every 6 hours PRN Moderate Pain (4 - 6)      ALLERGIES:  Allergies    No Known Drug Allergies  [place] (Rash)    Intolerances        LABS:                        10.0   22.10 )-----------( 427      ( 12 Oct 2023 06:33 )             31.2     10-12    134<L>  |  99  |  46<H>  ----------------------------<  313<H>  4.1   |  19<L>  |  10.00<H>    Ca    8.5      12 Oct 2023 06:33  Phos  6.6     10-12  Mg     2.4     10-12    TPro  6.7  /  Alb  2.0<L>  /  TBili  0.7  /  DBili  x   /  AST  7<L>  /  ALT  15  /  AlkPhos  113  10-12    PT/INR - ( 11 Oct 2023 06:50 )   PT: 15.3 sec;   INR: 1.32 ratio           Urinalysis Basic - ( 12 Oct 2023 06:33 )    Color: x / Appearance: x / SG: x / pH: x  Gluc: 313 mg/dL / Ketone: x  / Bili: x / Urobili: x   Blood: x / Protein: x / Nitrite: x   Leuk Esterase: x / RBC: x / WBC x   Sq Epi: x / Non Sq Epi: x / Bacteria: x        RADIOLOGY & ADDITIONAL TESTS:    CT LLE 10/10:  FINDINGS:    Osseous: Status post remote below-the-knee amputation. No acute fracture   or dislocation. No definite focal cortical erosion or aggressive   periosteal reaction. Mild to moderate left hip arthrosis without   significant effusion. Moderate patellofemoral compartment predominant   left knee arthrosis with small volume effusion.    Soft tissues: Deep soft tissue ulceration along the posterior medial   margin of the knee at the level of the femoral condyles. Surrounding   intramuscular and perifascial soft tissue emphysema tracking cranially   along the myotendinous junction of the semitendinosus up to the level of   the distal femoral diaphysis and caudally into the pes anserinus bursa.   Inflammatory stranding most prominent in and about the semimembranosus   bursa, but without sizable hyperattenuating hematoma or drainable   encapsulated fluid collection on this non-IV contrast enhanced   examination. Advanced atherosclerosis. Prominent nonpathologically   enlarged reactive lymph nodes.    IMPRESSION:    Status post remote left below-the-knee amputation. Deep soft tissue   ulceration along the posterior medial margin of the knee with surrounding   soft tissue intramuscular and perifascial soft tissue emphysema tracking   cranially along the semitendinosus up to the level of the distal femoral   shaft and caudally into the pes anserinus bursa. Differential includes   necrotizing fasciitis in the proper clinical setting. No drainable mature   abscess on this non-IV contrast enhanced examination. Recommend surgical   consultation. Findings and recommendation discussed with attending Dr. Olivas via telephone at approximately 9:55 AM on 10/11/2023 by Dr. Hunter.        Lines/Tubes:  -PIVs  -R brachial AVF      GLOBAL ISSUE/BEST PRACTICE  Analgesia: Y  Sedation: Y  HOB elevation: yes  Stress ulcer prophylaxis: Y  VTE prophylaxis: Y  Glycemic control: Y  Nutrition: Y    CODE STATUS: Full Code

## 2023-10-12 NOTE — PROGRESS NOTE ADULT - ASSESSMENT
48 yr old male with PMHx of DM2, ESRD on H.D. 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 1/2023 requiring multiple irrigation and debridements (last 3/2023) wound vac therapy with eventual Rt 3rd digit of Rt hand amputation (approx 2/2023) p/w worsening LLE wound/purulent drainage i/s/o hypoTN, leukocytosis, fever c/f sepsis now s/p Zosyn course and emergent L AKA 10/11 c/b refractory hypoTN requiring pressors    Neuro:  -Off sedation, melatonin for sleep  -Pain: Tylenol 650 q6, adding oxy prn    CV:  -A/w refractory hypoTN c/f septic shock; Tx: pressors, IVABX, s/p 1.75 IVF  -MAPs >65, now off pressors, continuing midodrine 10 TID  -Lactate neg  -MAPs >65    Pulm:  -SpO2 >92% RA  -IS postop    GI:  -NPO, advancing diet  -PPI and Milox  -Zofran PRN for n/v  -Postop bowel reg: senna    Renal:  -ESRD w/ HD MWF through R brachial AVF  -Serum lytes currently stable  -Nephrology following    Heme:  -H/h stable  -DVT ppx: SQH    ID:  -A/w septic shock and c/f LLE necrotizing fasciitis now s/p AKA, doing well clinically at the moment but will CTM closely  -Per VSx potential plan for OR 10/13 for stump closure  -MRSA swab 10/11 positive  -WBC 22k, currently afebrile  -IVABX per ID: Zosyn 10/12-10/18; s/p Vanco x2 last trough 19, will give another dose  -BCx 10/10 NGTD, OR Cx/UCx/Wound Cx pending    Endo:  -Hx DM2, BGs up to 300  -Continue SSI + lantus 5U and premeal 3U, consider increasing bedtime lantus    Lines/Tubes: R brachial (HD), PIVs (R EJ x1)    Dispo: ICU

## 2023-10-12 NOTE — PROGRESS NOTE ADULT - SUBJECTIVE AND OBJECTIVE BOX
Patient is a 48y Male whom presented to the hospital with esrd on hd     PAST MEDICAL & SURGICAL HISTORY:  ESRD on dialysis      H/O hypotension      Diabetes mellitus      Leg wound, left      Steal syndrome dialysis vascular access      Gangrene of finger of right hand      PVD (peripheral vascular disease)      Anemia of chronic disease      Constipation      HLD (hyperlipidemia)      S/P BKA (below knee amputation) bilateral      AV fistula          MEDICATIONS  (STANDING):  atorvastatin 40 milliGRAM(s) Oral at bedtime  collagenase Ointment 1 Application(s) Topical daily  dextrose 5%. 1000 milliLiter(s) (50 mL/Hr) IV Continuous <Continuous>  dextrose 5%. 1000 milliLiter(s) (100 mL/Hr) IV Continuous <Continuous>  dextrose 50% Injectable 25 Gram(s) IV Push once  dextrose 50% Injectable 25 Gram(s) IV Push once  dextrose 50% Injectable 12.5 Gram(s) IV Push once  dorzolamide 2%/timolol 0.5% Ophthalmic Solution 1 Drop(s) Both EYES two times a day  glucagon  Injectable 1 milliGRAM(s) IntraMuscular once  heparin   Injectable 5000 Unit(s) SubCutaneous every 12 hours  insulin lispro (ADMELOG) corrective regimen sliding scale   SubCutaneous every 6 hours  lactobacillus acidophilus 1 Tablet(s) Oral every 12 hours  midodrine. 10 milliGRAM(s) Oral three times a day  pantoprazole    Tablet 40 milliGRAM(s) Oral before breakfast  piperacillin/tazobactam IVPB.. 3.375 Gram(s) IV Intermittent every 12 hours  senna 2 Tablet(s) Oral at bedtime      Allergies    No Known Drug Allergies  [place] (Rash)    Intolerances        SOCIAL HISTORY:  Denies ETOh,Smoking,     FAMILY HISTORY:      REVIEW OF SYSTEMS:    CONSTITUTIONAL: No weakness, fevers or chills  EYES/ENT: No visual changes;  no throat pain   NECK: No pain or stiffness  RESPIRATORY: No cough, wheezing, hemoptysis; No shortness of breath  CARDIOVASCULAR: No chest pain or palpitations  GASTROINTESTINAL: No abdominal or epigastric pain. No nausea, vomiting,     No diarrhea or constipation. No melena                             10.0   22.10 )-----------( 427      ( 12 Oct 2023 06:33 )             31.2       CBC Full  -  ( 12 Oct 2023 06:33 )  WBC Count : 22.10 K/uL  RBC Count : 3.38 M/uL  Hemoglobin : 10.0 g/dL  Hematocrit : 31.2 %  Platelet Count - Automated : 427 K/uL  Mean Cell Volume : 92.3 fl  Mean Cell Hemoglobin : 29.6 pg  Mean Cell Hemoglobin Concentration : 32.1 gm/dL  Auto Neutrophil # : 20.20 K/uL  Auto Lymphocyte # : 1.11 K/uL  Auto Monocyte # : 0.54 K/uL  Auto Eosinophil # : 0.00 K/uL  Auto Basophil # : 0.05 K/uL  Auto Neutrophil % : 91.5 %  Auto Lymphocyte % : 5.0 %  Auto Monocyte % : 2.4 %  Auto Eosinophil % : 0.0 %  Auto Basophil % : 0.2 %      10-12    134<L>  |  99  |  46<H>  ----------------------------<  313<H>  4.1   |  19<L>  |  10.00<H>    Ca    8.5      12 Oct 2023 06:33  Phos  6.6     10-12  Mg     2.4     10-12    TPro  6.7  /  Alb  2.0<L>  /  TBili  0.7  /  DBili  x   /  AST  7<L>  /  ALT  15  /  AlkPhos  113  10-12      CAPILLARY BLOOD GLUCOSE      POCT Blood Glucose.: 163 mg/dL (12 Oct 2023 11:32)  POCT Blood Glucose.: 343 mg/dL (12 Oct 2023 08:17)  POCT Blood Glucose.: 255 mg/dL (11 Oct 2023 21:00)      Vital Signs Last 24 Hrs  T(C): 36.6 (12 Oct 2023 16:07), Max: 36.7 (12 Oct 2023 07:42)  T(F): 97.8 (12 Oct 2023 16:07), Max: 98 (12 Oct 2023 07:42)  HR: 81 (12 Oct 2023 20:30) (59 - 82)  BP: 82/45 (12 Oct 2023 20:30) (67/41 - 193/90)  BP(mean): 61 (12 Oct 2023 20:30) (50 - 129)  RR: 14 (12 Oct 2023 20:30) (6 - 24)  SpO2: 80% (12 Oct 2023 20:30) (80% - 100%)    Parameters below as of 12 Oct 2023 09:30  Patient On (Oxygen Delivery Method): room air        Urinalysis Basic - ( 12 Oct 2023 06:33 )    Color: x / Appearance: x / SG: x / pH: x  Gluc: 313 mg/dL / Ketone: x  / Bili: x / Urobili: x   Blood: x / Protein: x / Nitrite: x   Leuk Esterase: x / RBC: x / WBC x   Sq Epi: x / Non Sq Epi: x / Bacteria: x        PT/INR - ( 11 Oct 2023 06:50 )   PT: 15.3 sec;   INR: 1.32 ratio                   PHYSICAL EXAM:    Constitutional: NAD  HEENT: conjunctive   clear   Neck:  No JVD  Respiratory: CTAB  Cardiovascular: S1 and S2  Gastrointestinal: BS+, soft, NT/ND  Extremities: No peripheral edema  Neurological: A/O x 3, no focal deficits  Psychiatric: Normal mood, normal affect  : No Canc[place]  Skin: No rashes   S/P BKA (below knee amputation) bilateral  AV fistula  LABS:                        10.3   19.60 )-----------( 395      ( 10 Oct 2023 11:15 )             33.5     10-10    136  |  96  |  30<H>  ----------------------------<  323<H>  3.5   |  25  |  8.20<H>    Ca    9.3      10 Oct 2023 11:15    TPro  8.7<H>  /  Alb  2.4<L>  /  TBili  0.7  /  DBili  x   /  AST  27  /  ALT  27  /  AlkPhos  155<H>  10-10      Urine Studies:  Urinalysis Basic - ( 10 Oct 2023 11:15 )    Color: x / Appearance: x / SG: x / pH: x  Gluc: 323 mg/dL / Ketone: x  / Bili: x / Urobili: x   Blood: x / Protein: x / Nitrite: x   Leuk Esterase: x / RBC: x / WBC x   Sq Epi: x / Non Sq Epi: x / Bacteria: x            RADIOLOGY & ADDITIONAL STUDIES:        Home Medications:  Admelog 100 units/mL injectable solution: 4 unit(s) subcutaneously 3 times a day (10 Oct 2023 14:32)  amLODIPine 5 mg oral tablet: 1 tab(s) orally once a day (10 Oct 2023 14:32)  atorvastatin 40 mg oral tablet: 1 tab(s) orally once a day (10 Oct 2023 14:32)  insulin glargine 100 units/mL subcutaneous solution: 4 unit(s) subcutaneous once a day (at bedtime) (10 Oct 2023 14:32)  senna (sennosides) 8.6 mg oral tablet: 2 tab(s) orally once a day (at bedtime) (10 Oct 2023 14:32)  Velphoro 500 mg oral tablet, chewable: 3 tab(s) chewed 3 times a day (10 Oct 2023 14:32)

## 2023-10-13 LAB
-  AMIKACIN: SIGNIFICANT CHANGE UP
-  AMOXICILLIN/CLAVULANIC ACID: SIGNIFICANT CHANGE UP
-  AMPICILLIN/SULBACTAM: SIGNIFICANT CHANGE UP
-  AMPICILLIN: SIGNIFICANT CHANGE UP
-  AZTREONAM: SIGNIFICANT CHANGE UP
-  CEFAZOLIN: SIGNIFICANT CHANGE UP
-  CEFEPIME: SIGNIFICANT CHANGE UP
-  CEFOXITIN: SIGNIFICANT CHANGE UP
-  CEFTRIAXONE: SIGNIFICANT CHANGE UP
-  CIPROFLOXACIN: SIGNIFICANT CHANGE UP
-  ERTAPENEM: SIGNIFICANT CHANGE UP
-  GENTAMICIN: SIGNIFICANT CHANGE UP
-  IMIPENEM: SIGNIFICANT CHANGE UP
-  LEVOFLOXACIN: SIGNIFICANT CHANGE UP
-  MEROPENEM: SIGNIFICANT CHANGE UP
-  PIPERACILLIN/TAZOBACTAM: SIGNIFICANT CHANGE UP
-  TOBRAMYCIN: SIGNIFICANT CHANGE UP
-  TRIMETHOPRIM/SULFAMETHOXAZOLE: SIGNIFICANT CHANGE UP
ANION GAP SERPL CALC-SCNC: 7 MMOL/L — SIGNIFICANT CHANGE UP (ref 5–17)
BASOPHILS # BLD AUTO: 0.08 K/UL — SIGNIFICANT CHANGE UP (ref 0–0.2)
BASOPHILS NFR BLD AUTO: 0.6 % — SIGNIFICANT CHANGE UP (ref 0–2)
BUN SERPL-MCNC: 33 MG/DL — HIGH (ref 7–23)
CALCIUM SERPL-MCNC: 9.1 MG/DL — SIGNIFICANT CHANGE UP (ref 8.5–10.1)
CHLORIDE SERPL-SCNC: 96 MMOL/L — SIGNIFICANT CHANGE UP (ref 96–108)
CO2 SERPL-SCNC: 30 MMOL/L — SIGNIFICANT CHANGE UP (ref 22–31)
CREAT SERPL-MCNC: 6.9 MG/DL — HIGH (ref 0.5–1.3)
CULTURE RESULTS: SIGNIFICANT CHANGE UP
EGFR: 9 ML/MIN/1.73M2 — LOW
EOSINOPHIL # BLD AUTO: 0.19 K/UL — SIGNIFICANT CHANGE UP (ref 0–0.5)
EOSINOPHIL NFR BLD AUTO: 1.4 % — SIGNIFICANT CHANGE UP (ref 0–6)
GLUCOSE SERPL-MCNC: 354 MG/DL — HIGH (ref 70–99)
HCT VFR BLD CALC: 32.7 % — LOW (ref 39–50)
HGB BLD-MCNC: 10.3 G/DL — LOW (ref 13–17)
IMM GRANULOCYTES NFR BLD AUTO: 1.7 % — HIGH (ref 0–0.9)
LYMPHOCYTES # BLD AUTO: 1.99 K/UL — SIGNIFICANT CHANGE UP (ref 1–3.3)
LYMPHOCYTES # BLD AUTO: 14.4 % — SIGNIFICANT CHANGE UP (ref 13–44)
MAGNESIUM SERPL-MCNC: 2.4 MG/DL — SIGNIFICANT CHANGE UP (ref 1.6–2.6)
MCHC RBC-ENTMCNC: 29.5 PG — SIGNIFICANT CHANGE UP (ref 27–34)
MCHC RBC-ENTMCNC: 31.5 GM/DL — LOW (ref 32–36)
MCV RBC AUTO: 93.7 FL — SIGNIFICANT CHANGE UP (ref 80–100)
METHOD TYPE: SIGNIFICANT CHANGE UP
MONOCYTES # BLD AUTO: 0.82 K/UL — SIGNIFICANT CHANGE UP (ref 0–0.9)
MONOCYTES NFR BLD AUTO: 5.9 % — SIGNIFICANT CHANGE UP (ref 2–14)
NEUTROPHILS # BLD AUTO: 10.49 K/UL — HIGH (ref 1.8–7.4)
NEUTROPHILS NFR BLD AUTO: 76 % — SIGNIFICANT CHANGE UP (ref 43–77)
NRBC # BLD: 0 /100 WBCS — SIGNIFICANT CHANGE UP (ref 0–0)
ORGANISM # SPEC MICROSCOPIC CNT: SIGNIFICANT CHANGE UP
ORGANISM # SPEC MICROSCOPIC CNT: SIGNIFICANT CHANGE UP
PHOSPHATE SERPL-MCNC: 4.6 MG/DL — HIGH (ref 2.5–4.5)
PLATELET # BLD AUTO: 458 K/UL — HIGH (ref 150–400)
POTASSIUM SERPL-MCNC: 4.5 MMOL/L — SIGNIFICANT CHANGE UP (ref 3.5–5.3)
POTASSIUM SERPL-SCNC: 4.5 MMOL/L — SIGNIFICANT CHANGE UP (ref 3.5–5.3)
RBC # BLD: 3.49 M/UL — LOW (ref 4.2–5.8)
RBC # FLD: 16.1 % — HIGH (ref 10.3–14.5)
SODIUM SERPL-SCNC: 133 MMOL/L — LOW (ref 135–145)
SPECIMEN SOURCE: SIGNIFICANT CHANGE UP
VANCOMYCIN TROUGH SERPL-MCNC: 26 UG/ML — CRITICAL HIGH (ref 10–20)
WBC # BLD: 13.81 K/UL — HIGH (ref 3.8–10.5)
WBC # FLD AUTO: 13.81 K/UL — HIGH (ref 3.8–10.5)

## 2023-10-13 PROCEDURE — 93308 TTE F-UP OR LMTD: CPT | Mod: 26

## 2023-10-13 PROCEDURE — 99291 CRITICAL CARE FIRST HOUR: CPT | Mod: GC,25

## 2023-10-13 PROCEDURE — 76604 US EXAM CHEST: CPT | Mod: 26

## 2023-10-13 RX ORDER — INSULIN GLARGINE 100 [IU]/ML
10 INJECTION, SOLUTION SUBCUTANEOUS AT BEDTIME
Refills: 0 | Status: DISCONTINUED | OUTPATIENT
Start: 2023-10-13 | End: 2023-10-14

## 2023-10-13 RX ORDER — MUPIROCIN 20 MG/G
1 OINTMENT TOPICAL
Refills: 0 | Status: COMPLETED | OUTPATIENT
Start: 2023-10-13 | End: 2023-10-18

## 2023-10-13 RX ORDER — HYDROMORPHONE HYDROCHLORIDE 2 MG/ML
0.5 INJECTION INTRAMUSCULAR; INTRAVENOUS; SUBCUTANEOUS EVERY 4 HOURS
Refills: 0 | Status: DISCONTINUED | OUTPATIENT
Start: 2023-10-13 | End: 2023-10-19

## 2023-10-13 RX ORDER — INSULIN LISPRO 100/ML
5 VIAL (ML) SUBCUTANEOUS
Refills: 0 | Status: DISCONTINUED | OUTPATIENT
Start: 2023-10-13 | End: 2023-10-14

## 2023-10-13 RX ADMIN — Medication 3: at 11:13

## 2023-10-13 RX ADMIN — SENNA PLUS 2 TABLET(S): 8.6 TABLET ORAL at 21:36

## 2023-10-13 RX ADMIN — Medication 5 UNIT(S): at 11:14

## 2023-10-13 RX ADMIN — ATORVASTATIN CALCIUM 40 MILLIGRAM(S): 80 TABLET, FILM COATED ORAL at 21:37

## 2023-10-13 RX ADMIN — HEPARIN SODIUM 5000 UNIT(S): 5000 INJECTION INTRAVENOUS; SUBCUTANEOUS at 17:18

## 2023-10-13 RX ADMIN — OXYCODONE HYDROCHLORIDE 5 MILLIGRAM(S): 5 TABLET ORAL at 15:34

## 2023-10-13 RX ADMIN — INSULIN GLARGINE 10 UNIT(S): 100 INJECTION, SOLUTION SUBCUTANEOUS at 21:37

## 2023-10-13 RX ADMIN — Medication 2: at 21:36

## 2023-10-13 RX ADMIN — OXYCODONE HYDROCHLORIDE 5 MILLIGRAM(S): 5 TABLET ORAL at 22:22

## 2023-10-13 RX ADMIN — HYDROMORPHONE HYDROCHLORIDE 0.5 MILLIGRAM(S): 2 INJECTION INTRAMUSCULAR; INTRAVENOUS; SUBCUTANEOUS at 10:40

## 2023-10-13 RX ADMIN — Medication 1 TABLET(S): at 17:16

## 2023-10-13 RX ADMIN — PANTOPRAZOLE SODIUM 40 MILLIGRAM(S): 20 TABLET, DELAYED RELEASE ORAL at 05:12

## 2023-10-13 RX ADMIN — Medication 2: at 17:18

## 2023-10-13 RX ADMIN — MIDODRINE HYDROCHLORIDE 10 MILLIGRAM(S): 2.5 TABLET ORAL at 11:15

## 2023-10-13 RX ADMIN — HYDROMORPHONE HYDROCHLORIDE 0.5 MILLIGRAM(S): 2 INJECTION INTRAMUSCULAR; INTRAVENOUS; SUBCUTANEOUS at 10:55

## 2023-10-13 RX ADMIN — MIDODRINE HYDROCHLORIDE 10 MILLIGRAM(S): 2.5 TABLET ORAL at 05:11

## 2023-10-13 RX ADMIN — Medication 5: at 07:58

## 2023-10-13 RX ADMIN — HEPARIN SODIUM 5000 UNIT(S): 5000 INJECTION INTRAVENOUS; SUBCUTANEOUS at 05:12

## 2023-10-13 RX ADMIN — Medication 5 UNIT(S): at 17:18

## 2023-10-13 RX ADMIN — DORZOLAMIDE HYDROCHLORIDE TIMOLOL MALEATE 1 DROP(S): 20; 5 SOLUTION/ DROPS OPHTHALMIC at 05:11

## 2023-10-13 RX ADMIN — OXYCODONE HYDROCHLORIDE 5 MILLIGRAM(S): 5 TABLET ORAL at 07:05

## 2023-10-13 RX ADMIN — OXYCODONE HYDROCHLORIDE 5 MILLIGRAM(S): 5 TABLET ORAL at 21:37

## 2023-10-13 RX ADMIN — OXYCODONE HYDROCHLORIDE 5 MILLIGRAM(S): 5 TABLET ORAL at 16:12

## 2023-10-13 RX ADMIN — Medication 3 UNIT(S): at 07:59

## 2023-10-13 RX ADMIN — MIDODRINE HYDROCHLORIDE 10 MILLIGRAM(S): 2.5 TABLET ORAL at 17:17

## 2023-10-13 RX ADMIN — MUPIROCIN 1 APPLICATION(S): 20 OINTMENT TOPICAL at 17:17

## 2023-10-13 RX ADMIN — Medication 1 TABLET(S): at 05:11

## 2023-10-13 RX ADMIN — DORZOLAMIDE HYDROCHLORIDE TIMOLOL MALEATE 1 DROP(S): 20; 5 SOLUTION/ DROPS OPHTHALMIC at 17:19

## 2023-10-13 RX ADMIN — PIPERACILLIN AND TAZOBACTAM 25 GRAM(S): 4; .5 INJECTION, POWDER, LYOPHILIZED, FOR SOLUTION INTRAVENOUS at 11:15

## 2023-10-13 NOTE — PROGRESS NOTE ADULT - ASSESSMENT
48 yr old male with PMHx of DM2, ESRD on H.D. 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 Continue gabapentin  - Melatonin for sleep    CV:  Perioperative shock, distributive from sepsis/anesthesia vs hypovolemia  -Currently on levophed, maintain MAPs >65, wean as tolerated. Continue midodrine 10 TID  - Pt appears intravascularly depleted on bedside cardiac US, encourage PO intake to aid BP    Pulm:  - No active issues  - On room air, maintain SpO2 >92%    GI:  -Tolerating diet, dc PPI  -Zofran PRN for n/v  -Postop bowel reg: senna and miralax    Renal:  -ESRD w/ HD MWF through R brachial AVF  -s/p HD with removal of 2L yesterday. Nxt HD due Sat 10/14  -Serum lytes currently stable  -Nephrology following    Heme:  -H/h stable  -DVT ppx: SQH    ID:  -Septic shock and c/f LLE necrotizing fasciitis now s/p AKA, doing well clinically at the moment but will CTM closely  -MRSA swab 10/11 positive - continue mupirocin  -Leukocytosis downtrending, has been afebrile  -IVABX per ID: Zosyn 10/12-10/18; s/p Vanco x3, continue dosing by level - supratherapeutic this AM, will hold today and recheck level in AM  -BCx 10/10 NGTD, OR Cx +E. Coli, Wound Cx +GBS    Endo:  -Hx DM2, BGs up to 300s poorly controlled  -Increase bedtime Lantus to 10U, premeal to 5U, continue moderate scale ISS    MSK:  - Vascular following, plan for RTOR next week for stump closure  - Daily wound dressing changes    Lines/Tubes: R brachial (HD), PIVs (R EJ x1)    Dispo: ICU

## 2023-10-13 NOTE — PROGRESS NOTE ADULT - ASSESSMENT
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 Sin/Dr. Singh) Presents to ED [facility] 10/10 with weakness for 2 weeks. 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 Wound care consult and ID consults are requested .

## 2023-10-13 NOTE — PROGRESS NOTE ADULT - SUBJECTIVE AND OBJECTIVE BOX
Patient is a 48y old  Male who presents with a chief complaint of weakness (13 Oct 2023 10:00)    Interval events: Pt is s/p HD yesterday with 2L fluid removed, pressors initiated during dialysis and midodrine added to maintain normotensive. Hypotensive episode overnight to 70s/40s with MAP 55, on levophed. Pt seen and examined at bedside this AM, no acute complaints States his pain is well controlled with PO oxycodone. Noted to be intravascularly depleted on bedside cardiac US with collapsible IVC    Review of Systems:  Constitutional: no fever, chills, fatigue  Neuro: no headache, numbness, weakness, +intermittent dizziness  Resp: no cough, wheezing, shortness of breath  CVS: no chest pain, palpitations, leg swelling  GI: no abdominal pain, nausea, vomiting, diarrhea   : no dysuria, frequency, incontinence  Skin: no itching, burning, rashes, or lesions   Msk: +LLE pain    T(F): 98.1 (10-13-23 @ 12:01), Max: 98.1 (10-13-23 @ 12:01)  HR: 76 (10-13-23 @ 12:00) (58 - 82)  BP: 99/55 (10-13-23 @ 12:00) (72/42 - 186/84)  RR: 13 (10-13-23 @ 12:00) (7 - 25)  SpO2: 100% (10-13-23 @ 12:00) (80% - 100%)  Wt(kg): --        CAPILLARY BLOOD GLUCOSE      POCT Blood Glucose.: 299 mg/dL (13 Oct 2023 11:08)    I&O's Summary    12 Oct 2023 07:01  -  13 Oct 2023 07:00  --------------------------------------------------------  IN: 598 mL / OUT: 2000 mL / NET: -1402 mL    13 Oct 2023 07:01  -  13 Oct 2023 12:35  --------------------------------------------------------  IN: 204.8 mL / OUT: 0 mL / NET: 204.8 mL        Physical Exam:     General: NAD, comfortable  HEENT: NCAT, clear conjunctiva, no scleral icterus  Respiratory: CTA b/l, no wheezing, rhonchi, rales  Cardiovascular: RRR, normal S1S2, no M/R/G  Abdomen: soft, NT/ND, bowel sounds present  Extremities: LLE dressings C/D/I, +mild TTP, well-healed s/p R BKA and R hand 3rd digit amputation changes  Neurology: awake and alert    Meds:  MEDICATIONS  (STANDING):  atorvastatin 40 milliGRAM(s) Oral at bedtime  dextrose 5%. 1000 milliLiter(s) (50 mL/Hr) IV Continuous <Continuous>  dextrose 5%. 1000 milliLiter(s) (100 mL/Hr) IV Continuous <Continuous>  dextrose 50% Injectable 25 Gram(s) IV Push once  dextrose 50% Injectable 25 Gram(s) IV Push once  dextrose 50% Injectable 12.5 Gram(s) IV Push once  dorzolamide 2%/timolol 0.5% Ophthalmic Solution 1 Drop(s) Both EYES two times a day  glucagon  Injectable 1 milliGRAM(s) IntraMuscular once  heparin   Injectable 5000 Unit(s) SubCutaneous every 12 hours  insulin glargine Injectable (LANTUS) 10 Unit(s) SubCutaneous at bedtime  insulin lispro (ADMELOG) corrective regimen sliding scale   SubCutaneous Before meals and at bedtime  insulin lispro Injectable (ADMELOG) 5 Unit(s) SubCutaneous three times a day before meals  lactobacillus acidophilus 1 Tablet(s) Oral every 12 hours  midodrine. 10 milliGRAM(s) Oral three times a day  mupirocin 2% Nasal 1 Application(s) Both Nostrils two times a day  norepinephrine Infusion 0.05 MICROgram(s)/kG/Min (6 mL/Hr) IV Continuous <Continuous>  piperacillin/tazobactam IVPB.. 3.375 Gram(s) IV Intermittent every 12 hours  polyethylene glycol 3350 17 Gram(s) Oral daily  senna 2 Tablet(s) Oral at bedtime    MEDICATIONS  (PRN):  acetaminophen     Tablet .. 650 milliGRAM(s) Oral every 4 hours PRN Temp greater or equal to 38C (100.4F), Mild Pain (1 - 3)  aluminum hydroxide/magnesium hydroxide/simethicone Suspension 30 milliLiter(s) Oral every 4 hours PRN Dyspepsia  HYDROmorphone  Injectable 0.5 milliGRAM(s) IV Push every 4 hours PRN Severe Pain (7 - 10)  melatonin 3 milliGRAM(s) Oral at bedtime PRN Insomnia  ondansetron Injectable 4 milliGRAM(s) IV Push every 8 hours PRN Nausea and/or Vomiting  oxyCODONE    IR 5 milliGRAM(s) Oral every 6 hours PRN Moderate Pain (4 - 6)                            10.3   13.81 )-----------( 458      ( 13 Oct 2023 06:10 )             32.7       10-13    133<L>  |  96  |  33<H>  ----------------------------<  354<H>  4.5   |  30  |  6.90<H>    Ca    9.1      13 Oct 2023 06:10  Phos  4.6     10-13  Mg     2.4     10-13    TPro  6.7  /  Alb  2.0<L>  /  TBili  0.7  /  DBili  x   /  AST  7<L>  /  ALT  15  /  AlkPhos  113  10-12            Urinalysis Basic - ( 13 Oct 2023 06:10 )    Color: x / Appearance: x / SG: x / pH: x  Gluc: 354 mg/dL / Ketone: x  / Bili: x / Urobili: x   Blood: x / Protein: x / Nitrite: x   Leuk Esterase: x / RBC: x / WBC x   Sq Epi: x / Non Sq Epi: x / Bacteria: x      .Surgical Swab Surgical Swab   Moderate Escherichia coli  Few Gram Positive Cocci in Pairs and Chains -- 10-11 @ 13:51  .Blood Blood-Peripheral   No growth at 48 Hours -- 10-10 @ 12:00  .Blood Blood-Peripheral   No growth at 48 Hours -- 10-10 @ 11:15  Skin Skin   Culture yields growth of greater than 3 colony types of  bacteria,  which may indicate contamination and normal yoana  Call client services within 7 days if further workup is clinically  indicated. Culture includes  Numerous Streptococcus agalactiae (Group B) isolated  Group B streptococci are susceptible to ampicillin,  penicillin and cefazolin, but may be resistant to  erythromycin and clindamycin. -- 10-10 @ 10:30      Lines/Tubes:  -PIVs  -R brachial AVF  - R EJ      GLOBAL ISSUE/BEST PRACTICE  Analgesia: Y  Sedation: N  HOB elevation: yes  Stress ulcer prophylaxis: Y  VTE prophylaxis: Y  Glycemic control: Y  Nutrition: Y    CODE STATUS: Full Code

## 2023-10-13 NOTE — PROGRESS NOTE ADULT - ASSESSMENT
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 Sin/Dr. Singh) Presents to ED [facility] 10/10 with weakness for 2 weeks. 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 Wound care consult and ID consults are requested . (10 Oct 2023 12:52)      esrd on hd for sat   Excess fluids and waste products will be removed from your blood; your electrolytes will be balanced; your blood pressure will be controlled.    Admit for septic workup and ID evaluation,send blood and urine cx,serial lactate levels,monitor vitals closley,ivfs hydration,monitor urine output and renal profile,iv abx as per id cons  vancomycin  IVPB 500 milliGRAM(s) IV Intermittent once    BP monitoring,continue current antihypertensive meds, low salt diet,followup with PMD in 1-2 weeks      ANEMIA PLAN:  Anemia of chronic disease:  Well controlled by Aranesp  H and H subtherapeutic .  We will continue Aranesp aiming for a HCT of 32-36 %.   We will monitor Iron stores, B12 and RBC folate .

## 2023-10-13 NOTE — PROGRESS NOTE ADULT - ASSESSMENT
49 yo male with multiple comorbidities and bilateral BKA was consulted for necrotizing fascitis    Taken to OR for urgent left knee disarticulation for gas gangrne  POD#2  Hypotension requiring vasopressors; now off    Downtrending WBC  Cont abx as per ID  Daily dressing changes by vascular team  RTOR next week for AKA closure     49 yo male with multiple comorbidities and bilateral BKA was consulted for necrotizing fascitis    Taken to OR for urgent left knee disarticulation for gas gangrne  POD#2  Hypotension requiring vasopressors; now off    Downtrending WBC  Cont abx as per ID  Daily dressing changes by vascular team  RTOR next week for AKA closure      ADDENDUM: 1300pm  Posterior calf tender; purulent drainage milked from posterior wound; no malodor; Dressing changed; betadine soaked kerlex ABD and ACE wrap applied. Pt tolerated well

## 2023-10-13 NOTE — PROGRESS NOTE ADULT - ASSESSMENT
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 Sni/Dr. Singh) Presents to ED [facility] 10/10 with weakness for 2 weeks    anemia  esrd  weakness  DM  BKA  PAD  PVD  Pain    POD 2  vs noted  on ABX  labs reviewed  ICU care in progress    full code  lives with family at home - in [facility] as per renal  pain relief  oral hygiene  skin care  wound care  assist with needs

## 2023-10-13 NOTE — PHARMACOTHERAPY INTERVENTION NOTE - COMMENTS
Patient is a 47 yo M being treated for leg wound with vancomycin. Patient received one dose of vancomycin 750 mg 10/12 after dialysis, resulting in a  trough of 26 on 10/13. Since patient is due for dialysis 10/14, recommended to hold dose for 10/13, and drawing trough 10/14 in the morning to adjust dose accordingly. Recommendation accepted and trough entered.

## 2023-10-13 NOTE — PROGRESS NOTE ADULT - SUBJECTIVE AND OBJECTIVE BOX
CHIEF COMPLAINT/ REASON FOR VISIT  .. Patient was seen to address the  issue listed under PROBLEM LIST which is located toward bottom of this note     MELVI RODRIGUEZ    [facility] ICU1 07A    Allergies    No Known Drug Allergies  Tur[place] (Rash)    Intolerances        PAST MEDICAL & SURGICAL HISTORY:  ESRD on dialysis      H/O hypotension      Diabetes mellitus      Leg wound, left      Steal syndrome dialysis vascular access      Gangrene of finger of right hand      PVD (peripheral vascular disease)      Anemia of chronic disease      Constipation      HLD (hyperlipidemia)      S/P BKA (below knee amputation) bilateral      AV fistula          FAMILY HISTORY:      Home Medications:  Admelog 100 units/mL injectable solution: 4 unit(s) subcutaneously 3 times a day (10 Oct 2023 14:32)  amLODIPine 5 mg oral tablet: 1 tab(s) orally once a day (10 Oct 2023 14:32)  atorvastatin 40 mg oral tablet: 1 tab(s) orally once a day (10 Oct 2023 14:32)  insulin glargine 100 units/mL subcutaneous solution: 4 unit(s) subcutaneous once a day (at bedtime) (10 Oct 2023 14:32)  senna (sennosides) 8.6 mg oral tablet: 2 tab(s) orally once a day (at bedtime) (10 Oct 2023 14:32)  Velphoro 500 mg oral tablet, chewable: 3 tab(s) chewed 3 times a day (10 Oct 2023 14:32)      MEDICATIONS  (STANDING):  atorvastatin 40 milliGRAM(s) Oral at bedtime  dextrose 5%. 1000 milliLiter(s) (50 mL/Hr) IV Continuous <Continuous>  dextrose 5%. 1000 milliLiter(s) (100 mL/Hr) IV Continuous <Continuous>  dextrose 50% Injectable 25 Gram(s) IV Push once  dextrose 50% Injectable 12.5 Gram(s) IV Push once  dextrose 50% Injectable 25 Gram(s) IV Push once  dorzolamide 2%/timolol 0.5% Ophthalmic Solution 1 Drop(s) Both EYES two times a day  glucagon  Injectable 1 milliGRAM(s) IntraMuscular once  heparin   Injectable 5000 Unit(s) SubCutaneous every 12 hours  insulin glargine Injectable (LANTUS) 5 Unit(s) SubCutaneous at bedtime  insulin lispro (ADMELOG) corrective regimen sliding scale   SubCutaneous Before meals and at bedtime  insulin lispro Injectable (ADMELOG) 3 Unit(s) SubCutaneous three times a day before meals  lactobacillus acidophilus 1 Tablet(s) Oral every 12 hours  midodrine. 10 milliGRAM(s) Oral three times a day  norepinephrine Infusion 0.05 MICROgram(s)/kG/Min (6 mL/Hr) IV Continuous <Continuous>  pantoprazole    Tablet 40 milliGRAM(s) Oral before breakfast  piperacillin/tazobactam IVPB.. 3.375 Gram(s) IV Intermittent every 12 hours  polyethylene glycol 3350 17 Gram(s) Oral daily  senna 2 Tablet(s) Oral at bedtime    MEDICATIONS  (PRN):  acetaminophen     Tablet .. 650 milliGRAM(s) Oral every 4 hours PRN Temp greater or equal to 38C (100.4F), Mild Pain (1 - 3)  aluminum hydroxide/magnesium hydroxide/simethicone Suspension 30 milliLiter(s) Oral every 4 hours PRN Dyspepsia  melatonin 3 milliGRAM(s) Oral at bedtime PRN Insomnia  ondansetron Injectable 4 milliGRAM(s) IV Push every 8 hours PRN Nausea and/or Vomiting  oxyCODONE    IR 5 milliGRAM(s) Oral every 6 hours PRN Moderate Pain (4 - 6)      Diet, Renal Restrictions:   For patients receiving Renal Replacement - No Protein Restr, No Conc K, No Conc Phos, Low Sodium (10-11-23 @ 18:21) [Active]          Vital Signs Last 24 Hrs  T(C): 36.6 (13 Oct 2023 04:08), Max: 36.7 (12 Oct 2023 07:42)  T(F): 97.9 (13 Oct 2023 04:08), Max: 98 (12 Oct 2023 07:42)  HR: 64 (13 Oct 2023 06:00) (59 - 82)  BP: 137/65 (13 Oct 2023 06:00) (67/41 - 193/90)  BP(mean): 93 (13 Oct 2023 06:00) (50 - 129)  RR: 16 (13 Oct 2023 06:00) (8 - 25)  SpO2: 100% (13 Oct 2023 06:00) (80% - 100%)    Parameters below as of 12 Oct 2023 09:30  Patient On (Oxygen Delivery Method): room air          10-11-23 @ 07:01  -  10-12-23 @ 07:00  --------------------------------------------------------  IN: 1164.4 mL / OUT: 0 mL / NET: 1164.4 mL    10-12-23 @ 07:01  -  10-13-23 @ 06:11  --------------------------------------------------------  IN: 598 mL / OUT: 2000 mL / NET: -1402 mL              LABS:                        10.0   22.10 )-----------( 427      ( 12 Oct 2023 06:33 )             31.2     10-12    134<L>  |  99  |  46<H>  ----------------------------<  313<H>  4.1   |  19<L>  |  10.00<H>    Ca    8.5      12 Oct 2023 06:33  Phos  6.6     10-12  Mg     2.4     10-12    TPro  6.7  /  Alb  2.0<L>  /  TBili  0.7  /  DBili  x   /  AST  7<L>  /  ALT  15  /  AlkPhos  113  10-12    PT/INR - ( 11 Oct 2023 06:50 )   PT: 15.3 sec;   INR: 1.32 ratio           Urinalysis Basic - ( 12 Oct 2023 06:33 )    Color: x / Appearance: x / SG: x / pH: x  Gluc: 313 mg/dL / Ketone: x  / Bili: x / Urobili: x   Blood: x / Protein: x / Nitrite: x   Leuk Esterase: x / RBC: x / WBC x   Sq Epi: x / Non Sq Epi: x / Bacteria: x            WBC:  WBC Count: 22.10 K/uL (10-12 @ 06:33)  WBC Count: 21.99 K/uL (10-11 @ 18:40)  WBC Count: 17.79 K/uL (10-11 @ 06:50)  WBC Count: 19.60 K/uL (10-10 @ 11:15)      MICROBIOLOGY:  RECENT CULTURES:  10-10 .Blood Blood-Peripheral XXXX XXXX   No growth at 48 Hours    10-10 .Blood Blood-Peripheral XXXX XXXX   No growth at 48 Hours    10-10 Skin Skin XXXX XXXX   Culture yields growth of greater than 3 colony types of  bacteria,  which may indicate contamination and normal yoana  Call client services within 7 days if further workup is clinically  indicated. Culture includes  Numerous Streptococcus agalactiae (Group B) isolated  Group B streptococci are susceptible to ampicillin,  penicillin and cefazolin, but may be resistant to  erythromycin and clindamycin.                PT/INR - ( 11 Oct 2023 06:50 )   PT: 15.3 sec;   INR: 1.32 ratio             Sodium:  Sodium: 134 mmol/L (10-12 @ 06:33)  Sodium: 136 mmol/L (10-11 @ 18:40)  Sodium: 135 mmol/L (10-11 @ 10:17)  Sodium: 136 mmol/L (10-11 @ 06:50)  Sodium: 136 mmol/L (10-10 @ 11:15)      10.00 mg/dL 10-12 @ 06:33  9.40 mg/dL 10-11 @ 18:40  9.10 mg/dL 10-11 @ 10:17  8.90 mg/dL 10-11 @ 06:50  8.20 mg/dL 10-10 @ 11:15      Hemoglobin:  Hemoglobin: 10.0 g/dL (10-12 @ 06:33)  Hemoglobin: 11.8 g/dL (10-11 @ 18:40)  Hemoglobin: 9.2 g/dL (10-11 @ 06:50)  Hemoglobin: 10.3 g/dL (10-10 @ 11:15)      Platelets: Platelet Count - Automated: 427 K/uL (10-12 @ 06:33)  Platelet Count - Automated: 456 K/uL (10-11 @ 18:40)  Platelet Count - Automated: 386 K/uL (10-11 @ 06:50)  Platelet Count - Automated: 395 K/uL (10-10 @ 11:15)      LIVER FUNCTIONS - ( 12 Oct 2023 06:33 )  Alb: 2.0 g/dL / Pro: 6.7 g/dL / ALK PHOS: 113 U/L / ALT: 15 U/L / AST: 7 U/L / GGT: x             Urinalysis Basic - ( 12 Oct 2023 06:33 )    Color: x / Appearance: x / SG: x / pH: x  Gluc: 313 mg/dL / Ketone: x  / Bili: x / Urobili: x   Blood: x / Protein: x / Nitrite: x   Leuk Esterase: x / RBC: x / WBC x   Sq Epi: x / Non Sq Epi: x / Bacteria: x        RADIOLOGY & ADDITIONAL STUDIES:      MICROBIOLOGY:  RECENT CULTURES:  10-10 .Blood Blood-Peripheral XXXX XXXX   No growth at 48 Hours    10-10 .Blood Blood-Peripheral XXXX XXXX   No growth at 48 Hours    10-10 Skin Skin XXXX XXXX   Culture yields growth of greater than 3 colony types of  bacteria,  which may indicate contamination and normal yoana  Call client services within 7 days if further workup is clinically  indicated. Culture includes  Numerous Streptococcus agalactiae (Group B) isolated  Group B streptococci are susceptible to ampicillin,  penicillin and cefazolin, but may be resistant to  erythromycin and clindamycin.

## 2023-10-13 NOTE — PROGRESS NOTE ADULT - ASSESSMENT
REASON FOR VISIT  .. Management of problems listed below        REVIEW OF SYMPTOMS   Able to give ROS  Yes     RELIABILITY +/-   CONSTITUTIONAL Weakness Yes    ENDOCRINE  No heat or cold intolerance    ALLERGY No hives  Sore throat No stridor  RESP Shortness of breath YES   NEURO New weakness No   CARDIAC   Palpitations No         PHYSICAL EXAM    HEENT Unremarkable  atraumatic   RESP Fair air entry  Harsh breath sound   CARDIAC S1 S2 No S3     NO JVD    ABDOMEN No hepatosplenomegaly   bl bka  NO rash       GENERAL DATA .     GOC.  .. 10/10/2023 full code  ICU STAY. .. 10/11/2023   COVID. ..  scv2 10/10/2023 (-)  BEST PRACTICE ISSUES.    HOB ELEVATN. .. Yes  DVT PPLX. ..  10/10/2023 [facility]    SPEECH SWALLOW RECOMMENDATIONS.    ..        DIET.   ..  10/11 renal restrn   ..  10/10/2023 npo   IV fl ...   BOLUS.   .. 10/11/2023 4a ns 500   .. 10/10/2023 4p ns 250   .. 10/10/2023 12p ns 500  .. 10/10/2023 9a ns 1000   STRESS ULCER PPLX. ..    10/10/2023 protonix 40  INFECTION PPLX. ..   10/13 mupirocin 2%   ALLGY. ..   turkey                      WT. ..  10/10/2023 59  BMI. ..      PROCEDURES.  .. 10/11/2023 l knee disarticulation     ABGS.   .     VS/ PO/IO/ VENT/ DRIPS.  10/13/2023 68 96/50   10/13/2023 6p nore .03 m/k/m      DOA C/C.   10/10/2023 Increasing weakness 2 weeks fevermalaise     INITIAL PRESENTATION.   . 10/10/2023 49yo male with weakness for 2 weeks. pt is dialysis patient M/W/F last dialysis was yesterday but pt has been feeling weak for the last 2 weeks, no cough, fever, chills, no vomiting or diarrhea, pt also has had an open wound under his left thigh that has been neglected for the last few weeks, draining pus and with redness  . Pulm critical care consulted as BP low     PMH.   . DM  . ESRD  . HD   . AV fistula  . BL BKA     HOME MEDS.   COURSE.     PROBLEMS/ASSESSMENT/RECOMMENDATIONS (A/R).    . Vanco level   .. 10/11-10/12-10/13/2023 vanco 15.6- 19.8 - 26     INFECTION.  . Skin soft tissue infection   . Necrotizing fascitis 10/10 CT   .. W 10/10-10/11-10/12-10/13/2023 w 19 - 17.7- 22- 13.8   .. ct lle 10/10   .... sp remote bka   .... no cortical eroison or aggressive periosteal reaction   .... deep st ulceration along post medial margin knee with surrounding st intramuscular and perifascial st emohysema tracking cranially dd necrotizing fascitis    .. Flu ab 10/10/2023 (-)  .. rsv 10/10/2023 (-)  .. MRSA 10/11 (+)   .. surg cs 10/11  .... 1) Mod e coli   .... 2) few strep agalct  gp b   .. bc 10/10 (-)   .. skin cs 10/10 strept agalacticae   .. 10/10 9a vanco 1g givn   .. 10/12 vanco 750 givn   .. 10/10/2023 zosyn     CARDIAC.  .. La 10/10/2023 la 1.1   .. Monitor     .. Shock  .. 10/10/2023 5:30 PM Has been given 2l fluid challenge  .. 10/10/2023 5:30 PM ra po 99%  .. 10/10/2023 will cautiously try more fluids  .. 10/12 10a nore 8 in 250   .. 10/11 midodrine 10.3   .. 10/10/2023 If continues to have map below 65 will need icu for iv vasopressors   .. 10/13/2023 On iv nore started 10/12  Target MAP 65 (+)     HEMAT.  .. Hb 10/10-10/11-10/12-10/13/2023 Hb 10.3 - 9.2 - 10-10.3   .. Inr 10/10/2023 inr 129   .. Monitor     RENAL.  . ESRD  .. Na 10/10/2023 Na 136  .. Cr 10/10/2023 Cr 8.2   .. HD as guided by renal     . NECROTISZING FASCITIS   .. ct lle 10/10   .... sp remote bka   .... no cortical eroison or aggressive periosteal reaction   .... deep st ulceration along post medial margin knee with surrounding st intramuscular and perifascial st emohysema tracking cranially dd necrotizing fascitis    .. 10/11/2023 10:30 AM Surgery called as soon as radiologist reported NF   .. Pt is cleared for urgent surgery from [facility] standpooint   .. 10/11 l knee disarticulation done     MAIN ISSUES   . SHOCK   . SEPSIS   . SKIN SOFT TISSUE CELLULITIS  10/10 l post knee pressure ulcer   . NECROITIZING FASCITIS 10/10 L knee ct   . l KNEE DISARTICULATION 10/11   . ESRD     OVERALL PLAN  . SSTI On vanco 10/10 started zosyn   . NECROTIZING FASCITIS 10/11/2023   . SHOCK 10/12/2023 Nore  Target MAP 65 (+)  . SP Urgent DISARTICULATION l knee 10/11   .. RTOR 1 week for AKA closure     TIME SPENT.   . Over 36 minutes aggregate care time spent on encounter; activities included   direct patient care, counseling and/or coordinating care reviewing notes, lab data/ imaging , discussion with multidisciplinary team/ patient  /family and explaining in detail risks, benefits, alternatives  of the recommendations     MICHAEL TIRADO 48 m 10/10/2023 [address] DR ELENA SCHM[address]ER DR MOHSEN PAHLAVAN

## 2023-10-13 NOTE — PROGRESS NOTE ADULT - SUBJECTIVE AND OBJECTIVE BOX
PROGRESS NOTE  Patient is a 48y old  Male who presents with a chief complaint of weakness (13 Oct 2023 12:34)  Chart and available morning labs /imaging are reviewed electronically , urgent issues addressed . More information  is being added upon completion of rounds , when more information is collected and management discussed with consultants , medical staff and social service/case management on the floor   OVERNIGHT  Interval events: Pt is s/p HD yesterday with 2L fluid removed, pressors initiated during dialysis and midodrine added to maintain normotensive. Hypotensive episode overnight to 70s/40s with MAP 55, on levophed. Pt seen and examined at bedside this AM, no acute complaints States his pain is well controlled with PO oxycodone. 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 Sin/Dr. Singh) Presents to ED [facility] 10/10 with weakness for 2 weeks. 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 Wound care consult and ID consults are requested . (10 Oct 2023 12:52)    PAST MEDICAL & SURGICAL HISTORY:  ESRD on dialysis      H/O hypotension      Diabetes mellitus      Leg wound, left      Steal syndrome dialysis vascular access      Gangrene of finger of right hand      PVD (peripheral vascular disease)      Anemia of chronic disease      Constipation      HLD (hyperlipidemia)      S/P BKA (below knee amputation) bilateral      AV fistula          MEDICATIONS  (STANDING):  atorvastatin 40 milliGRAM(s) Oral at bedtime  dextrose 5%. 1000 milliLiter(s) (50 mL/Hr) IV Continuous <Continuous>  dextrose 5%. 1000 milliLiter(s) (100 mL/Hr) IV Continuous <Continuous>  dextrose 50% Injectable 25 Gram(s) IV Push once  dextrose 50% Injectable 25 Gram(s) IV Push once  dextrose 50% Injectable 12.5 Gram(s) IV Push once  dorzolamide 2%/timolol 0.5% Ophthalmic Solution 1 Drop(s) Both EYES two times a day  glucagon  Injectable 1 milliGRAM(s) IntraMuscular once  heparin   Injectable 5000 Unit(s) SubCutaneous every 12 hours  insulin glargine Injectable (LANTUS) 10 Unit(s) SubCutaneous at bedtime  insulin lispro (ADMELOG) corrective regimen sliding scale   SubCutaneous Before meals and at bedtime  insulin lispro Injectable (ADMELOG) 5 Unit(s) SubCutaneous three times a day before meals  lactobacillus acidophilus 1 Tablet(s) Oral every 12 hours  midodrine. 10 milliGRAM(s) Oral three times a day  mupirocin 2% Nasal 1 Application(s) Both Nostrils two times a day  norepinephrine Infusion 0.05 MICROgram(s)/kG/Min (6 mL/Hr) IV Continuous <Continuous>  piperacillin/tazobactam IVPB.. 3.375 Gram(s) IV Intermittent every 12 hours  polyethylene glycol 3350 17 Gram(s) Oral daily  senna 2 Tablet(s) Oral at bedtime    MEDICATIONS  (PRN):  acetaminophen     Tablet .. 650 milliGRAM(s) Oral every 4 hours PRN Temp greater or equal to 38C (100.4F), Mild Pain (1 - 3)  aluminum hydroxide/magnesium hydroxide/simethicone Suspension 30 milliLiter(s) Oral every 4 hours PRN Dyspepsia  HYDROmorphone  Injectable 0.5 milliGRAM(s) IV Push every 4 hours PRN Severe Pain (7 - 10)  melatonin 3 milliGRAM(s) Oral at bedtime PRN Insomnia  ondansetron Injectable 4 milliGRAM(s) IV Push every 8 hours PRN Nausea and/or Vomiting  oxyCODONE    IR 5 milliGRAM(s) Oral every 6 hours PRN Moderate Pain (4 - 6)      OBJECTIVE    T(C): 36.7 (10-13-23 @ 12:01), Max: 36.7 (10-13-23 @ 12:01)  HR: 76 (10-13-23 @ 12:00) (58 - 82)  BP: 99/55 (10-13-23 @ 12:00) (72/42 - 186/84)  RR: 13 (10-13-23 @ 12:00) (7 - 25)  SpO2: 100% (10-13-23 @ 12:00) (80% - 100%)  Wt(kg): --  I&O's Summary    12 Oct 2023 07:01  -  13 Oct 2023 07:00  --------------------------------------------------------  IN: 598 mL / OUT: 2000 mL / NET: -1402 mL    13 Oct 2023 07:01  -  13 Oct 2023 13:10  --------------------------------------------------------  IN: 204.8 mL / OUT: 0 mL / NET: 204.8 mL          REVIEW OF SYSTEMS:  CONSTITUTIONAL: No fever, weight loss, or fatigue  EYES: No eye pain, visual disturbances, or discharge  ENMT:   No sinus or throat pain  NECK: No pain or stiffness  RESPIRATORY: No cough, wheezing, chills or hemoptysis; No shortness of breath  CARDIOVASCULAR: No chest pain, palpitations, dizziness, or leg swelling  GASTROINTESTINAL: No abdominal pain. No nausea, vomiting; No diarrhea or constipation. No melena or hematochezia.  GENITOURINARY: No dysuria, frequency, hematuria, or incontinence  NEUROLOGICAL: No headaches, memory loss, loss of strength, numbness, or tremors  SKIN: No itching, burning, rashes, or lesions   MUSCULOSKELETAL: No joint pain or swelling; No muscle, back, or extremity pain    PHYSICAL EXAM:  Appearance: NAD. VS past 24 hrs -as above   HEENT:   Moist oral mucosa. Conjunctiva clear b/l.   Neck : supple  Respiratory: Lungs CTAB.  Gastrointestinal:  Soft, nontender. No rebound. No rigidity. BS present	  Cardiovascular: RRR ,S1S2 present  Neurologic: Non-focal. Moving all extremities.  Extremities: No edema. No erythema. No calf tenderness.  Skin: No rashes, No ecchymoses, No cyanosis.	  wounds ,skin lesions-See skin assesment flow sheet   LABS:                        10.3   13.81 )-----------( 458      ( 13 Oct 2023 06:10 )             32.7     10-13    133<L>  |  96  |  33<H>  ----------------------------<  354<H>  4.5   |  30  |  6.90<H>    Ca    9.1      13 Oct 2023 06:10  Phos  4.6     10-13  Mg     2.4     10-13    TPro  6.7  /  Alb  2.0<L>  /  TBili  0.7  /  DBili  x   /  AST  7<L>  /  ALT  15  /  AlkPhos  113  10-12    CAPILLARY BLOOD GLUCOSE      POCT Blood Glucose.: 299 mg/dL (13 Oct 2023 11:08)  POCT Blood Glucose.: 353 mg/dL (13 Oct 2023 07:56)  POCT Blood Glucose.: 347 mg/dL (12 Oct 2023 21:39)  POCT Blood Glucose.: 336 mg/dL (12 Oct 2023 17:08)      Urinalysis Basic - ( 13 Oct 2023 06:10 )    Color: x / Appearance: x / SG: x / pH: x  Gluc: 354 mg/dL / Ketone: x  / Bili: x / Urobili: x   Blood: x / Protein: x / Nitrite: x   Leuk Esterase: x / RBC: x / WBC x   Sq Epi: x / Non Sq Epi: x / Bacteria: x        Culture - Surgical Swab (collected 11 Oct 2023 13:51)  Source: .Surgical Swab Surgical Swab  Preliminary Report (13 Oct 2023 12:49):    Moderate Escherichia coli    Few Streptococcus agalactiae (Group B) isolated    Group B streptococci are susceptible to ampicillin,    penicillin and cefazolin, but may be resistant to    erythromycin and clindamycin.    Culture - Blood (collected 10 Oct 2023 12:00)  Source: .Blood Blood-Peripheral  Preliminary Report (12 Oct 2023 19:01):    No growth at 48 Hours    Culture - Blood (collected 10 Oct 2023 11:15)  Source: .Blood Blood-Peripheral  Preliminary Report (12 Oct 2023 16:01):    No growth at 48 Hours    Culture - Other (collected 10 Oct 2023 10:30)  Source: Skin Skin  Preliminary Report (12 Oct 2023 14:59):    Culture yields growth of greater than 3 colony types of    bacteria,  which may indicate contamination and normal yoana    Call client services within 7 days if further workup is clinically    indicated. Culture includes    Numerous Streptococcus agalactiae (Group B) isolated    Group B streptococci are susceptible to ampicillin,    penicillin and cefazolin, but may be resistant to    erythromycin and clindamycin.      RADIOLOGY & ADDITIONAL TESTS:   reviewed elctronically  ASSESSMENT/PLAN:

## 2023-10-13 NOTE — PROGRESS NOTE ADULT - SUBJECTIVE AND OBJECTIVE BOX
Date/Time Patient Seen:  		  Referring MD:   Data Reviewed	       Patient is a 48y old  Male who presents with a chief complaint of weakness (12 Oct 2023 20:37)      Subjective/HPI     PAST MEDICAL & SURGICAL HISTORY:  ESRD on dialysis    H/O hypotension    Diabetes mellitus    Leg wound, left    Steal syndrome dialysis vascular access    Gangrene of finger of right hand    PVD (peripheral vascular disease)    Anemia of chronic disease    Constipation    HLD (hyperlipidemia)    S/P BKA (below knee amputation) bilateral    AV fistula          Medication list         MEDICATIONS  (STANDING):  atorvastatin 40 milliGRAM(s) Oral at bedtime  dextrose 5%. 1000 milliLiter(s) (50 mL/Hr) IV Continuous <Continuous>  dextrose 5%. 1000 milliLiter(s) (100 mL/Hr) IV Continuous <Continuous>  dextrose 50% Injectable 25 Gram(s) IV Push once  dextrose 50% Injectable 25 Gram(s) IV Push once  dextrose 50% Injectable 12.5 Gram(s) IV Push once  dorzolamide 2%/timolol 0.5% Ophthalmic Solution 1 Drop(s) Both EYES two times a day  glucagon  Injectable 1 milliGRAM(s) IntraMuscular once  heparin   Injectable 5000 Unit(s) SubCutaneous every 12 hours  insulin glargine Injectable (LANTUS) 5 Unit(s) SubCutaneous at bedtime  insulin lispro (ADMELOG) corrective regimen sliding scale   SubCutaneous Before meals and at bedtime  insulin lispro Injectable (ADMELOG) 3 Unit(s) SubCutaneous three times a day before meals  lactobacillus acidophilus 1 Tablet(s) Oral every 12 hours  midodrine. 10 milliGRAM(s) Oral three times a day  norepinephrine Infusion 0.05 MICROgram(s)/kG/Min (6 mL/Hr) IV Continuous <Continuous>  pantoprazole    Tablet 40 milliGRAM(s) Oral before breakfast  piperacillin/tazobactam IVPB.. 3.375 Gram(s) IV Intermittent every 12 hours  polyethylene glycol 3350 17 Gram(s) Oral daily  senna 2 Tablet(s) Oral at bedtime    MEDICATIONS  (PRN):  acetaminophen     Tablet .. 650 milliGRAM(s) Oral every 4 hours PRN Temp greater or equal to 38C (100.4F), Mild Pain (1 - 3)  aluminum hydroxide/magnesium hydroxide/simethicone Suspension 30 milliLiter(s) Oral every 4 hours PRN Dyspepsia  melatonin 3 milliGRAM(s) Oral at bedtime PRN Insomnia  ondansetron Injectable 4 milliGRAM(s) IV Push every 8 hours PRN Nausea and/or Vomiting  oxyCODONE    IR 5 milliGRAM(s) Oral every 6 hours PRN Moderate Pain (4 - 6)         Vitals log        ICU Vital Signs Last 24 Hrs  T(C): 36.6 (13 Oct 2023 04:08), Max: 36.7 (12 Oct 2023 07:42)  T(F): 97.9 (13 Oct 2023 04:08), Max: 98 (12 Oct 2023 07:42)  HR: 68 (13 Oct 2023 05:00) (59 - 82)  BP: 93/55 (13 Oct 2023 05:00) (67/41 - 193/90)  BP(mean): 69 (13 Oct 2023 05:00) (50 - 129)  ABP: --  ABP(mean): --  RR: 19 (13 Oct 2023 05:00) (8 - 25)  SpO2: 100% (13 Oct 2023 05:00) (80% - 100%)    O2 Parameters below as of 12 Oct 2023 09:30  Patient On (Oxygen Delivery Method): room air                 Input and Output:  I&O's Detail    11 Oct 2023 07:01  -  12 Oct 2023 07:00  --------------------------------------------------------  IN:    IV PiggyBack: 250 mL    Oral Fluid: 570 mL    Phenylephrine: 26.4 mL    PRBCs (Packed Red Blood Cells): 318 mL  Total IN: 1164.4 mL    OUT:  Total OUT: 0 mL    Total NET: 1164.4 mL      12 Oct 2023 07:01  -  13 Oct 2023 05:30  --------------------------------------------------------  IN:    IV PiggyBack: 100 mL    Norepinephrine: 15.6 mL    Oral Fluid: 480 mL  Total IN: 595.6 mL    OUT:    Other (mL): 2000 mL  Total OUT: 2000 mL    Total NET: -1404.4 mL          Lab Data                        10.0   22.10 )-----------( 427      ( 12 Oct 2023 06:33 )             31.2     10-12    134<L>  |  99  |  46<H>  ----------------------------<  313<H>  4.1   |  19<L>  |  10.00<H>    Ca    8.5      12 Oct 2023 06:33  Phos  6.6     10-12  Mg     2.4     10-12    TPro  6.7  /  Alb  2.0<L>  /  TBili  0.7  /  DBili  x   /  AST  7<L>  /  ALT  15  /  AlkPhos  113  10-12            Review of Systems	      Objective     Physical Examination    heart s1s2  lung dc BS      Pertinent Lab findings & Imaging      Barak:  NO   Adequate UO     I&O's Detail    11 Oct 2023 07:01  -  12 Oct 2023 07:00  --------------------------------------------------------  IN:    IV PiggyBack: 250 mL    Oral Fluid: 570 mL    Phenylephrine: 26.4 mL    PRBCs (Packed Red Blood Cells): 318 mL  Total IN: 1164.4 mL    OUT:  Total OUT: 0 mL    Total NET: 1164.4 mL      12 Oct 2023 07:01  -  13 Oct 2023 05:30  --------------------------------------------------------  IN:    IV PiggyBack: 100 mL    Norepinephrine: 15.6 mL    Oral Fluid: 480 mL  Total IN: 595.6 mL    OUT:    Other (mL): 2000 mL  Total OUT: 2000 mL    Total NET: -1404.4 mL               Discussed with:     Cultures:	        Radiology

## 2023-10-13 NOTE — CHART NOTE - NSCHARTNOTEFT_GEN_A_CORE
: LAURIE Mendoza    INDICATION: hypotension    PROCEDURE:  [X ] LIMITED ECHO  [ ] LIMITED CHEST  [ ] LIMITED RETROPERITONEAL  [ ] LIMITED ABDOMINAL  [ ] LIMITED DVT  [ ] NEEDLE GUIDANCE VASCULAR  [ ] NEEDLE GUIDANCE THORACENTESIS  [ ] NEEDLE GUIDANCE PARACENTESIS  [ ] NEEDLE GUIDANCE PERICARDIOCENTESIS  [ ] OTHER    FINDINGS:  RV smaller than LV. No pericardial effusion. IVC very small and collapsible.    INTERPRETATION:  Pt appears intravascularly depleted.

## 2023-10-13 NOTE — PROGRESS NOTE ADULT - SUBJECTIVE AND OBJECTIVE BOX
Patient is a 48y Male whom presented to the hospital with esrd on hd     PAST MEDICAL & SURGICAL HISTORY:  ESRD on dialysis      H/O hypotension      Diabetes mellitus      Leg wound, left      Steal syndrome dialysis vascular access      Gangrene of finger of right hand      PVD (peripheral vascular disease)      Anemia of chronic disease      Constipation      HLD (hyperlipidemia)      S/P BKA (below knee amputation) bilateral      AV fistula          MEDICATIONS  (STANDING):  atorvastatin 40 milliGRAM(s) Oral at bedtime  collagenase Ointment 1 Application(s) Topical daily  dextrose 5%. 1000 milliLiter(s) (50 mL/Hr) IV Continuous <Continuous>  dextrose 5%. 1000 milliLiter(s) (100 mL/Hr) IV Continuous <Continuous>  dextrose 50% Injectable 25 Gram(s) IV Push once  dextrose 50% Injectable 25 Gram(s) IV Push once  dextrose 50% Injectable 12.5 Gram(s) IV Push once  dorzolamide 2%/timolol 0.5% Ophthalmic Solution 1 Drop(s) Both EYES two times a day  glucagon  Injectable 1 milliGRAM(s) IntraMuscular once  heparin   Injectable 5000 Unit(s) SubCutaneous every 12 hours  insulin lispro (ADMELOG) corrective regimen sliding scale   SubCutaneous every 6 hours  lactobacillus acidophilus 1 Tablet(s) Oral every 12 hours  midodrine. 10 milliGRAM(s) Oral three times a day  pantoprazole    Tablet 40 milliGRAM(s) Oral before breakfast  piperacillin/tazobactam IVPB.. 3.375 Gram(s) IV Intermittent every 12 hours  senna 2 Tablet(s) Oral at bedtime      Allergies    No Known Drug Allergies  [place] (Rash)    Intolerances        SOCIAL HISTORY:  Denies ETOh,Smoking,     FAMILY HISTORY:      REVIEW OF SYSTEMS:    CONSTITUTIONAL: No weakness, fevers or chills  EYES/ENT: No visual changes;  no throat pain   NECK: No pain or stiffness  RESPIRATORY: No cough, wheezing, hemoptysis; No shortness of breath  CARDIOVASCULAR: No chest pain or palpitations  GASTROINTESTINAL: No abdominal or epigastric pain. No nausea, vomiting,     No diarrhea or constipation. No melena                             10.0   22.10 )-----------( 427      ( 12 Oct 2023 06:33 )             31.2       CBC Full  -  ( 12 Oct 2023 06:33 )  WBC Count : 22.10 K/uL  RBC Count : 3.38 M/uL  Hemoglobin : 10.0 g/dL  Hematocrit : 31.2 %  Platelet Count - Automated : 427 K/uL  Mean Cell Volume : 92.3 fl  Mean Cell Hemoglobin : 29.6 pg  Mean Cell Hemoglobin Concentration : 32.1 gm/dL  Auto Neutrophil # : 20.20 K/uL  Auto Lymphocyte # : 1.11 K/uL  Auto Monocyte # : 0.54 K/uL  Auto Eosinophil # : 0.00 K/uL  Auto Basophil # : 0.05 K/uL  Auto Neutrophil % : 91.5 %  Auto Lymphocyte % : 5.0 %  Auto Monocyte % : 2.4 %  Auto Eosinophil % : 0.0 %  Auto Basophil % : 0.2 %      10-12    134<L>  |  99  |  46<H>  ----------------------------<  313<H>  4.1   |  19<L>  |  10.00<H>    Ca    8.5      12 Oct 2023 06:33  Phos  6.6     10-12  Mg     2.4     10-12    TPro  6.7  /  Alb  2.0<L>  /  TBili  0.7  /  DBili  x   /  AST  7<L>  /  ALT  15  /  AlkPhos  113  10-12      CAPILLARY BLOOD GLUCOSE      POCT Blood Glucose.: 163 mg/dL (12 Oct 2023 11:32)  POCT Blood Glucose.: 343 mg/dL (12 Oct 2023 08:17)  POCT Blood Glucose.: 255 mg/dL (11 Oct 2023 21:00)      Vital Signs Last 24 Hrs  T(C): 36.6 (12 Oct 2023 16:07), Max: 36.7 (12 Oct 2023 07:42)  T(F): 97.8 (12 Oct 2023 16:07), Max: 98 (12 Oct 2023 07:42)  HR: 81 (12 Oct 2023 20:30) (59 - 82)  BP: 82/45 (12 Oct 2023 20:30) (67/41 - 193/90)  BP(mean): 61 (12 Oct 2023 20:30) (50 - 129)  RR: 14 (12 Oct 2023 20:30) (6 - 24)  SpO2: 80% (12 Oct 2023 20:30) (80% - 100%)    Parameters below as of 12 Oct 2023 09:30  Patient On (Oxygen Delivery Method): room air        Urinalysis Basic - ( 12 Oct 2023 06:33 )    Color: x / Appearance: x / SG: x / pH: x  Gluc: 313 mg/dL / Ketone: x  / Bili: x / Urobili: x   Blood: x / Protein: x / Nitrite: x   Leuk Esterase: x / RBC: x / WBC x   Sq Epi: x / Non Sq Epi: x / Bacteria: x                          10.3   13.81 )-----------( 458      ( 13 Oct 2023 06:10 )             32.7       CBC Full  -  ( 13 Oct 2023 06:10 )  WBC Count : 13.81 K/uL  RBC Count : 3.49 M/uL  Hemoglobin : 10.3 g/dL  Hematocrit : 32.7 %  Platelet Count - Automated : 458 K/uL  Mean Cell Volume : 93.7 fl  Mean Cell Hemoglobin : 29.5 pg  Mean Cell Hemoglobin Concentration : 31.5 gm/dL  Auto Neutrophil # : 10.49 K/uL  Auto Lymphocyte # : 1.99 K/uL  Auto Monocyte # : 0.82 K/uL  Auto Eosinophil # : 0.19 K/uL  Auto Basophil # : 0.08 K/uL  Auto Neutrophil % : 76.0 %  Auto Lymphocyte % : 14.4 %  Auto Monocyte % : 5.9 %  Auto Eosinophil % : 1.4 %  Auto Basophil % : 0.6 %      10-13    133<L>  |  96  |  33<H>  ----------------------------<  354<H>  4.5   |  30  |  6.90<H>    Ca    9.1      13 Oct 2023 06:10  Phos  4.6     10-13  Mg     2.4     10-13    TPro  6.7  /  Alb  2.0<L>  /  TBili  0.7  /  DBili  x   /  AST  7<L>  /  ALT  15  /  AlkPhos  113  10-12      CAPILLARY BLOOD GLUCOSE      POCT Blood Glucose.: 299 mg/dL (13 Oct 2023 11:08)  POCT Blood Glucose.: 353 mg/dL (13 Oct 2023 07:56)  POCT Blood Glucose.: 347 mg/dL (12 Oct 2023 21:39)  POCT Blood Glucose.: 336 mg/dL (12 Oct 2023 17:08)      Vital Signs Last 24 Hrs  T(C): 36.7 (13 Oct 2023 12:01), Max: 36.7 (13 Oct 2023 12:01)  T(F): 98.1 (13 Oct 2023 12:01), Max: 98.1 (13 Oct 2023 12:01)  HR: 76 (13 Oct 2023 15:45) (58 - 88)  BP: 181/77 (13 Oct 2023 15:45) (70/43 - 186/84)  BP(mean): 110 (13 Oct 2023 15:45) (50 - 120)  RR: 21 (13 Oct 2023 15:45) (7 - 25)  SpO2: 98% (13 Oct 2023 15:45) (80% - 100%)        Urinalysis Basic - ( 13 Oct 2023 06:10 )    Color: x / Appearance: x / SG: x / pH: x  Gluc: 354 mg/dL / Ketone: x  / Bili: x / Urobili: x   Blood: x / Protein: x / Nitrite: x   Leuk Esterase: x / RBC: x / WBC x   Sq Epi: x / Non Sq Epi: x / Bacteria: x              PHYSICAL EXAM:    Constitutional: NAD  HEENT: conjunctive   clear   Neck:  No JVD  Respiratory: CTAB  Cardiovascular: S1 and S2  Gastrointestinal: BS+, soft, NT/ND  Extremities: No peripheral edema  Neurological: A/O x 3, no focal deficits  Psychiatric: Normal mood, normal affect  : No [place]  Skin: No rashes   S/P BKA (below knee amputation) bilateral  AV fistula  LABS:

## 2023-10-13 NOTE — PROGRESS NOTE ADULT - SUBJECTIVE AND OBJECTIVE BOX
Patient is a 48y old  Male who presents with a chief complaint of weakness (13 Oct 2023 06:11)    Pt without c/o fevers or chills  Vasopressors on/off    MEDICATIONS  (STANDING):  atorvastatin 40 milliGRAM(s) Oral at bedtime  dextrose 5%. 1000 milliLiter(s) (100 mL/Hr) IV Continuous <Continuous>  dextrose 5%. 1000 milliLiter(s) (50 mL/Hr) IV Continuous <Continuous>  dextrose 50% Injectable 25 Gram(s) IV Push once  dextrose 50% Injectable 12.5 Gram(s) IV Push once  dextrose 50% Injectable 25 Gram(s) IV Push once  dorzolamide 2%/timolol 0.5% Ophthalmic Solution 1 Drop(s) Both EYES two times a day  glucagon  Injectable 1 milliGRAM(s) IntraMuscular once  heparin   Injectable 5000 Unit(s) SubCutaneous every 12 hours  insulin glargine Injectable (LANTUS) 10 Unit(s) SubCutaneous at bedtime  insulin lispro (ADMELOG) corrective regimen sliding scale   SubCutaneous Before meals and at bedtime  insulin lispro Injectable (ADMELOG) 5 Unit(s) SubCutaneous three times a day before meals  lactobacillus acidophilus 1 Tablet(s) Oral every 12 hours  midodrine. 10 milliGRAM(s) Oral three times a day  mupirocin 2% Nasal 1 Application(s) Both Nostrils two times a day  norepinephrine Infusion 0.05 MICROgram(s)/kG/Min (6 mL/Hr) IV Continuous <Continuous>  piperacillin/tazobactam IVPB.. 3.375 Gram(s) IV Intermittent every 12 hours  polyethylene glycol 3350 17 Gram(s) Oral daily  senna 2 Tablet(s) Oral at bedtime    MEDICATIONS  (PRN):  acetaminophen     Tablet .. 650 milliGRAM(s) Oral every 4 hours PRN Temp greater or equal to 38C (100.4F), Mild Pain (1 - 3)  aluminum hydroxide/magnesium hydroxide/simethicone Suspension 30 milliLiter(s) Oral every 4 hours PRN Dyspepsia  HYDROmorphone  Injectable 0.5 milliGRAM(s) IV Push every 4 hours PRN Severe Pain (7 - 10)  melatonin 3 milliGRAM(s) Oral at bedtime PRN Insomnia  ondansetron Injectable 4 milliGRAM(s) IV Push every 8 hours PRN Nausea and/or Vomiting  oxyCODONE    IR 5 milliGRAM(s) Oral every 6 hours PRN Moderate Pain (4 - 6)      Allergies  No Known Drug Allergies  [place] (Rash)    Vital Signs Last 24 Hrs  T(C): 36.6 (13 Oct 2023 08:00), Max: 36.6 (12 Oct 2023 13:00)  T(F): 97.8 (13 Oct 2023 08:00), Max: 97.9 (12 Oct 2023 20:38)  HR: 61 (13 Oct 2023 09:15) (58 - 82)  BP: 100/54 (13 Oct 2023 09:15) (67/41 - 193/90)  BP(mean): 73 (13 Oct 2023 09:15) (50 - 129)  RR: 16 (13 Oct 2023 09:15) (8 - 25)  SpO2: 100% (13 Oct 2023 09:15) (80% - 100%)      I&O's Detail    12 Oct 2023 07:01  -  13 Oct 2023 07:00  --------------------------------------------------------  IN:    IV PiggyBack: 100 mL    Norepinephrine: 18 mL    Oral Fluid: 480 mL  Total IN: 598 mL    OUT:    Other (mL): 2000 mL  Total OUT: 2000 mL    Total NET: -1402 mL      13 Oct 2023 07:01  -  13 Oct 2023 10:01  --------------------------------------------------------  IN:    Norepinephrine: 4.8 mL    Oral Fluid: 200 mL  Total IN: 204.8 mL    OUT:  Total OUT: 0 mL    Total NET: 204.8 mL    Physical Exam:  General: NAD, resting comfortably in bed  Pulmonary: Nonlabored breathing, no respiratory distress, CTA  Cardiovascular: NSR  Abdominal: soft, NT/ND  Extremities: L stump dressing with betadine/serosang strikethrough. Dressing to be changed later today      LABS:                        10.3   13.81 )-----------( 458      ( 13 Oct 2023 06:10 )             32.7     10-13    133<L>  |  96  |  33<H>  ----------------------------<  354<H>  4.5   |  30  |  6.90<H>    Ca    9.1      13 Oct 2023 06:10  Phos  4.6     10-13  Mg     2.4     10-13    TPro  6.7  /  Alb  2.0<L>  /  TBili  0.7  /  DBili  x   /  AST  7<L>  /  ALT  15  /  AlkPhos  113  10-12      CAPILLARY BLOOD GLUCOSE  POCT Blood Glucose.: 353 mg/dL (13 Oct 2023 07:56)  POCT Blood Glucose.: 347 mg/dL (12 Oct 2023 21:39)  POCT Blood Glucose.: 163 mg/dL (12 Oct 2023 11:32)    RECENT CULTURES:  10-11 .Surgical Swab Surgical Swab XXXX XXXX   Moderate Escherichia coli  Few Gram Positive Cocci in Pairs and Chains    10-10 .Blood Blood-Peripheral XXXX XXXX   No growth at 48 Hours    10-10 .Blood Blood-Peripheral XXXX XXXX   No growth at 48 Hours    10-10 Skin Skin XXXX XXXX   Culture yields growth of greater than 3 colony types of  bacteria,  which may indicate contamination and normal yoana  Call client services within 7 days if further workup is clinically  indicated. Culture includes  Numerous Streptococcus agalactiae (Group B) isolated  Group B streptococci are susceptible to ampicillin,  penicillin and cefazolin, but may be resistant to  erythromycin and clindamycin.          Radiology and Additional Studies:

## 2023-10-13 NOTE — PHARMACOTHERAPY INTERVENTION NOTE - NSPHARMCOMMASP
ASP - Lab/ test recommended
ASP - Renal dose adjustment

## 2023-10-14 LAB
-  AMIKACIN: SIGNIFICANT CHANGE UP
-  AMOXICILLIN/CLAVULANIC ACID: SIGNIFICANT CHANGE UP
-  AMPICILLIN/SULBACTAM: SIGNIFICANT CHANGE UP
-  AMPICILLIN: SIGNIFICANT CHANGE UP
-  AZTREONAM: SIGNIFICANT CHANGE UP
-  CEFAZOLIN: SIGNIFICANT CHANGE UP
-  CEFEPIME: SIGNIFICANT CHANGE UP
-  CEFOXITIN: SIGNIFICANT CHANGE UP
-  CEFTRIAXONE: SIGNIFICANT CHANGE UP
-  CIPROFLOXACIN: SIGNIFICANT CHANGE UP
-  ERTAPENEM: SIGNIFICANT CHANGE UP
-  GENTAMICIN: SIGNIFICANT CHANGE UP
-  IMIPENEM: SIGNIFICANT CHANGE UP
-  LEVOFLOXACIN: SIGNIFICANT CHANGE UP
-  MEROPENEM: SIGNIFICANT CHANGE UP
-  PIPERACILLIN/TAZOBACTAM: SIGNIFICANT CHANGE UP
-  TOBRAMYCIN: SIGNIFICANT CHANGE UP
-  TRIMETHOPRIM/SULFAMETHOXAZOLE: SIGNIFICANT CHANGE UP
ALBUMIN SERPL ELPH-MCNC: 2 G/DL — LOW (ref 3.3–5)
ALP SERPL-CCNC: 136 U/L — HIGH (ref 40–120)
ALT FLD-CCNC: 14 U/L — SIGNIFICANT CHANGE UP (ref 12–78)
ANION GAP SERPL CALC-SCNC: 14 MMOL/L — SIGNIFICANT CHANGE UP (ref 5–17)
AST SERPL-CCNC: 14 U/L — LOW (ref 15–37)
BASOPHILS # BLD AUTO: 0.12 K/UL — SIGNIFICANT CHANGE UP (ref 0–0.2)
BASOPHILS NFR BLD AUTO: 1.1 % — SIGNIFICANT CHANGE UP (ref 0–2)
BILIRUB SERPL-MCNC: 0.6 MG/DL — SIGNIFICANT CHANGE UP (ref 0.2–1.2)
BUN SERPL-MCNC: 42 MG/DL — HIGH (ref 7–23)
CALCIUM SERPL-MCNC: 8.8 MG/DL — SIGNIFICANT CHANGE UP (ref 8.5–10.1)
CHLORIDE SERPL-SCNC: 95 MMOL/L — LOW (ref 96–108)
CO2 SERPL-SCNC: 24 MMOL/L — SIGNIFICANT CHANGE UP (ref 22–31)
CREAT SERPL-MCNC: 8.4 MG/DL — HIGH (ref 0.5–1.3)
CULTURE RESULTS: SIGNIFICANT CHANGE UP
EGFR: 7 ML/MIN/1.73M2 — LOW
EOSINOPHIL # BLD AUTO: 0.49 K/UL — SIGNIFICANT CHANGE UP (ref 0–0.5)
EOSINOPHIL NFR BLD AUTO: 4.5 % — SIGNIFICANT CHANGE UP (ref 0–6)
GLUCOSE SERPL-MCNC: 253 MG/DL — HIGH (ref 70–99)
HCT VFR BLD CALC: 35.4 % — LOW (ref 39–50)
HGB BLD-MCNC: 11.1 G/DL — LOW (ref 13–17)
IMM GRANULOCYTES NFR BLD AUTO: 3.4 % — HIGH (ref 0–0.9)
LYMPHOCYTES # BLD AUTO: 19.3 % — SIGNIFICANT CHANGE UP (ref 13–44)
LYMPHOCYTES # BLD AUTO: 2.12 K/UL — SIGNIFICANT CHANGE UP (ref 1–3.3)
MAGNESIUM SERPL-MCNC: 2.5 MG/DL — SIGNIFICANT CHANGE UP (ref 1.6–2.6)
MCHC RBC-ENTMCNC: 29.4 PG — SIGNIFICANT CHANGE UP (ref 27–34)
MCHC RBC-ENTMCNC: 31.4 GM/DL — LOW (ref 32–36)
MCV RBC AUTO: 93.7 FL — SIGNIFICANT CHANGE UP (ref 80–100)
METHOD TYPE: SIGNIFICANT CHANGE UP
MONOCYTES # BLD AUTO: 0.91 K/UL — HIGH (ref 0–0.9)
MONOCYTES NFR BLD AUTO: 8.3 % — SIGNIFICANT CHANGE UP (ref 2–14)
NEUTROPHILS # BLD AUTO: 6.98 K/UL — SIGNIFICANT CHANGE UP (ref 1.8–7.4)
NEUTROPHILS NFR BLD AUTO: 63.4 % — SIGNIFICANT CHANGE UP (ref 43–77)
NRBC # BLD: 0 /100 WBCS — SIGNIFICANT CHANGE UP (ref 0–0)
ORGANISM # SPEC MICROSCOPIC CNT: SIGNIFICANT CHANGE UP
ORGANISM # SPEC MICROSCOPIC CNT: SIGNIFICANT CHANGE UP
PHOSPHATE SERPL-MCNC: 4.6 MG/DL — HIGH (ref 2.5–4.5)
PLATELET # BLD AUTO: 464 K/UL — HIGH (ref 150–400)
POTASSIUM SERPL-MCNC: 4.3 MMOL/L — SIGNIFICANT CHANGE UP (ref 3.5–5.3)
POTASSIUM SERPL-SCNC: 4.3 MMOL/L — SIGNIFICANT CHANGE UP (ref 3.5–5.3)
PROT SERPL-MCNC: 7.4 G/DL — SIGNIFICANT CHANGE UP (ref 6–8.3)
RBC # BLD: 3.78 M/UL — LOW (ref 4.2–5.8)
RBC # FLD: 15 % — HIGH (ref 10.3–14.5)
SODIUM SERPL-SCNC: 133 MMOL/L — LOW (ref 135–145)
SPECIMEN SOURCE: SIGNIFICANT CHANGE UP
VANCOMYCIN TROUGH SERPL-MCNC: 24.5 UG/ML — HIGH (ref 10–20)
WBC # BLD: 10.99 K/UL — HIGH (ref 3.8–10.5)
WBC # FLD AUTO: 10.99 K/UL — HIGH (ref 3.8–10.5)

## 2023-10-14 PROCEDURE — 99291 CRITICAL CARE FIRST HOUR: CPT | Mod: GC

## 2023-10-14 RX ORDER — MIDODRINE HYDROCHLORIDE 2.5 MG/1
20 TABLET ORAL THREE TIMES A DAY
Refills: 0 | Status: DISCONTINUED | OUTPATIENT
Start: 2023-10-14 | End: 2023-10-19

## 2023-10-14 RX ORDER — INSULIN LISPRO 100/ML
7 VIAL (ML) SUBCUTANEOUS
Refills: 0 | Status: DISCONTINUED | OUTPATIENT
Start: 2023-10-14 | End: 2023-10-15

## 2023-10-14 RX ORDER — SODIUM CHLORIDE 9 MG/ML
500 INJECTION, SOLUTION INTRAVENOUS ONCE
Refills: 0 | Status: COMPLETED | OUTPATIENT
Start: 2023-10-14 | End: 2023-10-14

## 2023-10-14 RX ORDER — INSULIN GLARGINE 100 [IU]/ML
15 INJECTION, SOLUTION SUBCUTANEOUS AT BEDTIME
Refills: 0 | Status: DISCONTINUED | OUTPATIENT
Start: 2023-10-14 | End: 2023-10-15

## 2023-10-14 RX ADMIN — Medication 5 UNIT(S): at 07:34

## 2023-10-14 RX ADMIN — OXYCODONE HYDROCHLORIDE 5 MILLIGRAM(S): 5 TABLET ORAL at 22:50

## 2023-10-14 RX ADMIN — PIPERACILLIN AND TAZOBACTAM 25 GRAM(S): 4; .5 INJECTION, POWDER, LYOPHILIZED, FOR SOLUTION INTRAVENOUS at 14:04

## 2023-10-14 RX ADMIN — Medication 3: at 07:33

## 2023-10-14 RX ADMIN — Medication 1 TABLET(S): at 05:21

## 2023-10-14 RX ADMIN — Medication 1: at 21:54

## 2023-10-14 RX ADMIN — OXYCODONE HYDROCHLORIDE 5 MILLIGRAM(S): 5 TABLET ORAL at 21:53

## 2023-10-14 RX ADMIN — MIDODRINE HYDROCHLORIDE 10 MILLIGRAM(S): 2.5 TABLET ORAL at 05:21

## 2023-10-14 RX ADMIN — Medication 7 UNIT(S): at 16:55

## 2023-10-14 RX ADMIN — DORZOLAMIDE HYDROCHLORIDE TIMOLOL MALEATE 1 DROP(S): 20; 5 SOLUTION/ DROPS OPHTHALMIC at 05:20

## 2023-10-14 RX ADMIN — INSULIN GLARGINE 15 UNIT(S): 100 INJECTION, SOLUTION SUBCUTANEOUS at 21:54

## 2023-10-14 RX ADMIN — MIDODRINE HYDROCHLORIDE 20 MILLIGRAM(S): 2.5 TABLET ORAL at 17:16

## 2023-10-14 RX ADMIN — ATORVASTATIN CALCIUM 40 MILLIGRAM(S): 80 TABLET, FILM COATED ORAL at 21:53

## 2023-10-14 RX ADMIN — MUPIROCIN 1 APPLICATION(S): 20 OINTMENT TOPICAL at 17:16

## 2023-10-14 RX ADMIN — SODIUM CHLORIDE 1000 MILLILITER(S): 9 INJECTION, SOLUTION INTRAVENOUS at 09:17

## 2023-10-14 RX ADMIN — HEPARIN SODIUM 5000 UNIT(S): 5000 INJECTION INTRAVENOUS; SUBCUTANEOUS at 17:17

## 2023-10-14 RX ADMIN — PIPERACILLIN AND TAZOBACTAM 25 GRAM(S): 4; .5 INJECTION, POWDER, LYOPHILIZED, FOR SOLUTION INTRAVENOUS at 00:33

## 2023-10-14 RX ADMIN — MIDODRINE HYDROCHLORIDE 20 MILLIGRAM(S): 2.5 TABLET ORAL at 11:57

## 2023-10-14 RX ADMIN — Medication 6 MICROGRAM(S)/KG/MIN: at 10:30

## 2023-10-14 RX ADMIN — Medication 7 UNIT(S): at 11:57

## 2023-10-14 RX ADMIN — Medication 1 TABLET(S): at 17:16

## 2023-10-14 RX ADMIN — MUPIROCIN 1 APPLICATION(S): 20 OINTMENT TOPICAL at 05:21

## 2023-10-14 RX ADMIN — DORZOLAMIDE HYDROCHLORIDE TIMOLOL MALEATE 1 DROP(S): 20; 5 SOLUTION/ DROPS OPHTHALMIC at 17:21

## 2023-10-14 RX ADMIN — HEPARIN SODIUM 5000 UNIT(S): 5000 INJECTION INTRAVENOUS; SUBCUTANEOUS at 05:20

## 2023-10-14 NOTE — PROGRESS NOTE ADULT - ASSESSMENT
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 Sin/Dr. Singh) Presents to ED [facility] 10/10 with weakness for 2 weeks. 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 Wound care consult and ID consults are requested . (10 Oct 2023 12:52)      esrd on hd for mwf   Excess fluids and waste products will be removed from your blood; your electrolytes will be balanced; your blood pressure will be controlled.    Admit for septic workup and ID evaluation,send blood and urine cx,serial lactate levels,monitor vitals closley,ivfs hydration,monitor urine output and renal profile,iv abx as per id cons  vancomycin  IVPB 500 milliGRAM(s) IV Intermittent once    BP monitoring,continue current  meds, low salt diet,followup with PMD in 1-2 weeks  midodrine. 10 milliGRAM(s) Oral three times a day  norepinephrine Infusion 0.05 MICROgram(s)/kG/Min (6 mL/Hr) IV Continuous       ANEMIA PLAN:  Anemia of chronic disease:  We will continue epo aiming for a HCT of 32-36 %.   We will monitor Iron stores, B12 and RBC folate .    dvt   heparin   Injectable 5000 Unit(s) SubCutaneous every 12 hours

## 2023-10-14 NOTE — PROGRESS NOTE ADULT - SUBJECTIVE AND OBJECTIVE BOX
Date/Time Patient Seen:  		  Referring MD:   Data Reviewed	       Patient is a 48y old  Male who presents with a chief complaint of weakness (13 Oct 2023 15:50)      Subjective/HPI     PAST MEDICAL & SURGICAL HISTORY:  ESRD on dialysis    H/O hypotension    Diabetes mellitus    Leg wound, left    Steal syndrome dialysis vascular access    Gangrene of finger of right hand    PVD (peripheral vascular disease)    Anemia of chronic disease    Constipation    HLD (hyperlipidemia)    S/P BKA (below knee amputation) bilateral    AV fistula          Medication list         MEDICATIONS  (STANDING):  atorvastatin 40 milliGRAM(s) Oral at bedtime  dextrose 5%. 1000 milliLiter(s) (50 mL/Hr) IV Continuous <Continuous>  dextrose 5%. 1000 milliLiter(s) (100 mL/Hr) IV Continuous <Continuous>  dextrose 50% Injectable 25 Gram(s) IV Push once  dextrose 50% Injectable 12.5 Gram(s) IV Push once  dextrose 50% Injectable 25 Gram(s) IV Push once  dorzolamide 2%/timolol 0.5% Ophthalmic Solution 1 Drop(s) Both EYES two times a day  glucagon  Injectable 1 milliGRAM(s) IntraMuscular once  heparin   Injectable 5000 Unit(s) SubCutaneous every 12 hours  insulin glargine Injectable (LANTUS) 10 Unit(s) SubCutaneous at bedtime  insulin lispro (ADMELOG) corrective regimen sliding scale   SubCutaneous Before meals and at bedtime  insulin lispro Injectable (ADMELOG) 5 Unit(s) SubCutaneous three times a day before meals  lactobacillus acidophilus 1 Tablet(s) Oral every 12 hours  midodrine. 10 milliGRAM(s) Oral three times a day  mupirocin 2% Nasal 1 Application(s) Both Nostrils two times a day  norepinephrine Infusion 0.05 MICROgram(s)/kG/Min (6 mL/Hr) IV Continuous <Continuous>  piperacillin/tazobactam IVPB.. 3.375 Gram(s) IV Intermittent every 12 hours  polyethylene glycol 3350 17 Gram(s) Oral daily  senna 2 Tablet(s) Oral at bedtime    MEDICATIONS  (PRN):  acetaminophen     Tablet .. 650 milliGRAM(s) Oral every 4 hours PRN Temp greater or equal to 38C (100.4F), Mild Pain (1 - 3)  aluminum hydroxide/magnesium hydroxide/simethicone Suspension 30 milliLiter(s) Oral every 4 hours PRN Dyspepsia  HYDROmorphone  Injectable 0.5 milliGRAM(s) IV Push every 4 hours PRN Severe Pain (7 - 10)  melatonin 3 milliGRAM(s) Oral at bedtime PRN Insomnia  ondansetron Injectable 4 milliGRAM(s) IV Push every 8 hours PRN Nausea and/or Vomiting  oxyCODONE    IR 5 milliGRAM(s) Oral every 6 hours PRN Moderate Pain (4 - 6)         Vitals log        ICU Vital Signs Last 24 Hrs  T(C): 36.7 (14 Oct 2023 04:00), Max: 37.1 (13 Oct 2023 16:01)  T(F): 98 (14 Oct 2023 04:00), Max: 98.7 (13 Oct 2023 16:01)  HR: 72 (14 Oct 2023 04:30) (58 - 88)  BP: 186/74 (14 Oct 2023 04:30) (70/43 - 186/84)  BP(mean): 107 (14 Oct 2023 04:30) (47 - 146)  ABP: --  ABP(mean): --  RR: 19 (14 Oct 2023 04:30) (7 - 24)  SpO2: 100% (14 Oct 2023 04:30) (90% - 100%)             Input and Output:  I&O's Detail    12 Oct 2023 07:01  -  13 Oct 2023 07:00  --------------------------------------------------------  IN:    IV PiggyBack: 100 mL    Norepinephrine: 18 mL    Oral Fluid: 480 mL  Total IN: 598 mL    OUT:    Other (mL): 2000 mL  Total OUT: 2000 mL    Total NET: -1402 mL      13 Oct 2023 07:01  -  14 Oct 2023 05:28  --------------------------------------------------------  IN:    IV PiggyBack: 200 mL    Norepinephrine: 51.6 mL    Oral Fluid: 700 mL  Total IN: 951.6 mL    OUT:    Voided (mL): 0 mL  Total OUT: 0 mL    Total NET: 951.6 mL          Lab Data                        10.3   13.81 )-----------( 458      ( 13 Oct 2023 06:10 )             32.7     10-13    133<L>  |  96  |  33<H>  ----------------------------<  354<H>  4.5   |  30  |  6.90<H>    Ca    9.1      13 Oct 2023 06:10  Phos  4.6     10-13  Mg     2.4     10-13    TPro  6.7  /  Alb  2.0<L>  /  TBili  0.7  /  DBili  x   /  AST  7<L>  /  ALT  15  /  AlkPhos  113  10-12            Review of Systems	      Objective     Physical Examination    heart s1s2  lung dc BS      Pertinent Lab findings & Imaging      Barak:  NO   Adequate UO     I&O's Detail    12 Oct 2023 07:01  -  13 Oct 2023 07:00  --------------------------------------------------------  IN:    IV PiggyBack: 100 mL    Norepinephrine: 18 mL    Oral Fluid: 480 mL  Total IN: 598 mL    OUT:    Other (mL): 2000 mL  Total OUT: 2000 mL    Total NET: -1402 mL      13 Oct 2023 07:01  -  14 Oct 2023 05:28  --------------------------------------------------------  IN:    IV PiggyBack: 200 mL    Norepinephrine: 51.6 mL    Oral Fluid: 700 mL  Total IN: 951.6 mL    OUT:    Voided (mL): 0 mL  Total OUT: 0 mL    Total NET: 951.6 mL               Discussed with:     Cultures:	        Radiology

## 2023-10-14 NOTE — PROGRESS NOTE ADULT - ASSESSMENT
48 yr old male with PMHx of DM2, ESRD on H.D. 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 1/2023 requiring multiple irrigation and debridements (last 3/2023) wound vac therapy with eventual Rt 3rd digit of Rt hand amputation (approx 2/2023) p/w worsening LLE wound/purulent drainage i/s/o hypoTN, leukocytosis, fever c/f sepsis now s/p Zosyn course and emergent L AKA 10/11 c/b refractory hypoTN requiring pressors    Neuro:  -Off sedation, melatonin for sleep  -Pain: Tylenol 650 q6, oxy prn    CV:  -A/w refractory hypoTN c/f septic shock, now improving  -MAPs >65 w/ intermittent levo gtt, now off pressors  -Will increase to midodrine 20 TID, will give 500cc LR today prior to HD  -Lactate neg    Pulm:  -SpO2 >92% RA  -IS postop    GI:  -Will give renal restricted + CC diet w/ snack i/s/o increased BGs  -PPI and Milox  -Zofran PRN for n/v  -Senna, miralax    Renal:  -ESRD w/ HD MWF through R brachial AVF; plan for HD today w/ goal net even  -Serum lytes currently stable  -Nephrology following    Heme:  -H/h stable  -DVT ppx: SQH    ID:  -A/w septic shock and c/f LLE necrotizing fasciitis now s/p AKA, doing well clinically at the moment but will CTM closely  -Per VSx plan for OR "next week" for stump closure  -MRSA swab 10/11 positive  -WBC on admit 22k now down to 11k, currently afebrile  -IVABX per ID: Zosyn 10/12-10/18; s/p Vanco x3  -BCx 10/10 NGTD, OR Cx: +GBS +EColi, Wound Cx: +GBS    Endo:  -Hx DM2, BGs up to 200s  -Continue SSI; will increase Lantus 15u qhs and 7u premeals    Lines/Tubes: R brachial (HD), PIVs (R EJ x1)    Dispo: ICU

## 2023-10-14 NOTE — PROGRESS NOTE ADULT - SUBJECTIVE AND OBJECTIVE BOX
CHIEF COMPLAINT/ REASON FOR VISIT  .. Patient was seen to address the  issue listed under PROBLEM LIST which is located toward bottom of this note     MELVI RODRIGUEZ    [facility] 07A    Allergies    No Known Drug Allergies  [place] (Rash)    Intolerances        PAST MEDICAL & SURGICAL HISTORY:  ESRD on dialysis      H/O hypotension      Diabetes mellitus      Leg wound, left      Steal syndrome dialysis vascular access      Gangrene of finger of right hand      PVD (peripheral vascular disease)      Anemia of chronic disease      Constipation      HLD (hyperlipidemia)      S/P BKA (below knee amputation) bilateral      AV fistula          FAMILY HISTORY:      Home Medications:  Admelog 100 units/mL injectable solution: 4 unit(s) subcutaneously 3 times a day (10 Oct 2023 14:32)  amLODIPine 5 mg oral tablet: 1 tab(s) orally once a day (10 Oct 2023 14:32)  atorvastatin 40 mg oral tablet: 1 tab(s) orally once a day (10 Oct 2023 14:32)  insulin glargine 100 units/mL subcutaneous solution: 4 unit(s) subcutaneous once a day (at bedtime) (10 Oct 2023 14:32)  senna (sennosides) 8.6 mg oral tablet: 2 tab(s) orally once a day (at bedtime) (10 Oct 2023 14:32)  Velphoro 500 mg oral tablet, chewable: 3 tab(s) chewed 3 times a day (10 Oct 2023 14:32)      MEDICATIONS  (STANDING):  atorvastatin 40 milliGRAM(s) Oral at bedtime  dextrose 5%. 1000 milliLiter(s) (50 mL/Hr) IV Continuous <Continuous>  dextrose 5%. 1000 milliLiter(s) (100 mL/Hr) IV Continuous <Continuous>  dextrose 50% Injectable 25 Gram(s) IV Push once  dextrose 50% Injectable 25 Gram(s) IV Push once  dextrose 50% Injectable 12.5 Gram(s) IV Push once  dorzolamide 2%/timolol 0.5% Ophthalmic Solution 1 Drop(s) Both EYES two times a day  glucagon  Injectable 1 milliGRAM(s) IntraMuscular once  heparin   Injectable 5000 Unit(s) SubCutaneous every 12 hours  insulin glargine Injectable (LANTUS) 10 Unit(s) SubCutaneous at bedtime  insulin lispro (ADMELOG) corrective regimen sliding scale   SubCutaneous Before meals and at bedtime  insulin lispro Injectable (ADMELOG) 5 Unit(s) SubCutaneous three times a day before meals  lactobacillus acidophilus 1 Tablet(s) Oral every 12 hours  midodrine. 10 milliGRAM(s) Oral three times a day  mupirocin 2% Nasal 1 Application(s) Both Nostrils two times a day  norepinephrine Infusion 0.05 MICROgram(s)/kG/Min (6 mL/Hr) IV Continuous <Continuous>  piperacillin/tazobactam IVPB.. 3.375 Gram(s) IV Intermittent every 12 hours  polyethylene glycol 3350 17 Gram(s) Oral daily  senna 2 Tablet(s) Oral at bedtime    MEDICATIONS  (PRN):  acetaminophen     Tablet .. 650 milliGRAM(s) Oral every 4 hours PRN Temp greater or equal to 38C (100.4F), Mild Pain (1 - 3)  aluminum hydroxide/magnesium hydroxide/simethicone Suspension 30 milliLiter(s) Oral every 4 hours PRN Dyspepsia  HYDROmorphone  Injectable 0.5 milliGRAM(s) IV Push every 4 hours PRN Severe Pain (7 - 10)  melatonin 3 milliGRAM(s) Oral at bedtime PRN Insomnia  ondansetron Injectable 4 milliGRAM(s) IV Push every 8 hours PRN Nausea and/or Vomiting  oxyCODONE    IR 5 milliGRAM(s) Oral every 6 hours PRN Moderate Pain (4 - 6)      Diet, Renal Restrictions:   For patients receiving Renal Replacement - No Protein Restr, No Conc K, No Conc Phos, Low Sodium (10-11-23 @ 18:21) [Active]          Vital Signs Last 24 Hrs  T(C): 36.7 (14 Oct 2023 04:00), Max: 37.1 (13 Oct 2023 16:01)  T(F): 98 (14 Oct 2023 04:00), Max: 98.7 (13 Oct 2023 16:01)  HR: 72 (14 Oct 2023 06:45) (60 - 88)  BP: 160/61 (14 Oct 2023 06:45) (70/43 - 186/84)  BP(mean): 88 (14 Oct 2023 06:45) (47 - 146)  RR: 22 (14 Oct 2023 06:45) (7 - 24)  SpO2: 100% (14 Oct 2023 06:45) (90% - 100%)          10-13-23 @ 07:01  -  10-14-23 @ 07:00  --------------------------------------------------------  IN: 970.8 mL / OUT: 0 mL / NET: 970.8 mL              LABS:                        10.3   13.81 )-----------( 458      ( 13 Oct 2023 06:10 )             32.7     10-13    133<L>  |  96  |  33<H>  ----------------------------<  354<H>  4.5   |  30  |  6.90<H>    Ca    9.1      13 Oct 2023 06:10  Phos  4.6     10-13  Mg     2.4     10-13        Urinalysis Basic - ( 13 Oct 2023 06:10 )    Color: x / Appearance: x / SG: x / pH: x  Gluc: 354 mg/dL / Ketone: x  / Bili: x / Urobili: x   Blood: x / Protein: x / Nitrite: x   Leuk Esterase: x / RBC: x / WBC x   Sq Epi: x / Non Sq Epi: x / Bacteria: x            WBC:  WBC Count: 13.81 K/uL (10-13 @ 06:10)  WBC Count: 22.10 K/uL (10-12 @ 06:33)  WBC Count: 21.99 K/uL (10-11 @ 18:40)  WBC Count: 17.79 K/uL (10-11 @ 06:50)  WBC Count: 19.60 K/uL (10-10 @ 11:15)      MICROBIOLOGY:  RECENT CULTURES:  10-11 .Surgical Swab Surgical Swab XXXX XXXX   Moderate Escherichia coli  Few Streptococcus agalactiae (Group B) isolated  Group B streptococci are susceptible to ampicillin,  penicillin and cefazolin, but may be resistant to  erythromycin and clindamycin.    10-10 .Blood Blood-Peripheral XXXX XXXX   No growth at 72 Hours    10-10 .Blood Blood-Peripheral XXXX XXXX   No growth at 72 Hours    10-10 Skin Skin Escherichia coli XXXX   Culture yields growth of greater than 3 colony types of  bacteria,  which may indicate contamination and normal yoana  Call client services within 7 days if further workup is clinically  indicated. Culture includes  Numerous Streptococcus agalactiae (Group B) isolated  Group B streptococci are susceptible to ampicillin,  penicillin and cefazolin, but may be resistant to  erythromycin and clindamycin.  Numerous Escherichia coli                    Sodium:  Sodium: 133 mmol/L (10-13 @ 06:10)  Sodium: 134 mmol/L (10-12 @ 06:33)  Sodium: 136 mmol/L (10-11 @ 18:40)  Sodium: 135 mmol/L (10-11 @ 10:17)  Sodium: 136 mmol/L (10-11 @ 06:50)  Sodium: 136 mmol/L (10-10 @ 11:15)      6.90 mg/dL 10-13 @ 06:10  10.00 mg/dL 10-12 @ 06:33  9.40 mg/dL 10-11 @ 18:40  9.10 mg/dL 10-11 @ 10:17  8.90 mg/dL 10-11 @ 06:50  8.20 mg/dL 10-10 @ 11:15      Hemoglobin:  Hemoglobin: 10.3 g/dL (10-13 @ 06:10)  Hemoglobin: 10.0 g/dL (10-12 @ 06:33)  Hemoglobin: 11.8 g/dL (10-11 @ 18:40)  Hemoglobin: 9.2 g/dL (10-11 @ 06:50)  Hemoglobin: 10.3 g/dL (10-10 @ 11:15)      Platelets: Platelet Count - Automated: 458 K/uL (10-13 @ 06:10)  Platelet Count - Automated: 427 K/uL (10-12 @ 06:33)  Platelet Count - Automated: 456 K/uL (10-11 @ 18:40)  Platelet Count - Automated: 386 K/uL (10-11 @ 06:50)  Platelet Count - Automated: 395 K/uL (10-10 @ 11:15)          Urinalysis Basic - ( 13 Oct 2023 06:10 )    Color: x / Appearance: x / SG: x / pH: x  Gluc: 354 mg/dL / Ketone: x  / Bili: x / Urobili: x   Blood: x / Protein: x / Nitrite: x   Leuk Esterase: x / RBC: x / WBC x   Sq Epi: x / Non Sq Epi: x / Bacteria: x        RADIOLOGY & ADDITIONAL STUDIES:      MICROBIOLOGY:  RECENT CULTURES:  10-11 .Surgical Swab Surgical Swab XXXX XXXX   Moderate Escherichia coli  Few Streptococcus agalactiae (Group B) isolated  Group B streptococci are susceptible to ampicillin,  penicillin and cefazolin, but may be resistant to  erythromycin and clindamycin.    10-10 .Blood Blood-Peripheral XXXX XXXX   No growth at 72 Hours    10-10 .Blood Blood-Peripheral XXXX XXXX   No growth at 72 Hours    10-10 Skin Skin Escherichia coli XXXX   Culture yields growth of greater than 3 colony types of  bacteria,  which may indicate contamination and normal yoana  Call client services within 7 days if further workup is clinically  indicated. Culture includes  Numerous Streptococcus agalactiae (Group B) isolated  Group B streptococci are susceptible to ampicillin,  penicillin and cefazolin, but may be resistant to  erythromycin and clindamycin.  Numerous Escherichia coli

## 2023-10-14 NOTE — PROGRESS NOTE ADULT - SUBJECTIVE AND OBJECTIVE BOX
Patient is a 48y Male whom presented to the hospital with esrd on hd     PAST MEDICAL & SURGICAL HISTORY:  ESRD on dialysis      H/O hypotension      Diabetes mellitus      Leg wound, left      Steal syndrome dialysis vascular access      Gangrene of finger of right hand      PVD (peripheral vascular disease)      Anemia of chronic disease      Constipation      HLD (hyperlipidemia)      S/P BKA (below knee amputation) bilateral      AV fistula          MEDICATIONS  (STANDING):  atorvastatin 40 milliGRAM(s) Oral at bedtime  collagenase Ointment 1 Application(s) Topical daily  dextrose 5%. 1000 milliLiter(s) (50 mL/Hr) IV Continuous <Continuous>  dextrose 5%. 1000 milliLiter(s) (100 mL/Hr) IV Continuous <Continuous>  dextrose 50% Injectable 25 Gram(s) IV Push once  dextrose 50% Injectable 25 Gram(s) IV Push once  dextrose 50% Injectable 12.5 Gram(s) IV Push once  dorzolamide 2%/timolol 0.5% Ophthalmic Solution 1 Drop(s) Both EYES two times a day  glucagon  Injectable 1 milliGRAM(s) IntraMuscular once  heparin   Injectable 5000 Unit(s) SubCutaneous every 12 hours  insulin lispro (ADMELOG) corrective regimen sliding scale   SubCutaneous every 6 hours  lactobacillus acidophilus 1 Tablet(s) Oral every 12 hours  midodrine. 10 milliGRAM(s) Oral three times a day  pantoprazole    Tablet 40 milliGRAM(s) Oral before breakfast  piperacillin/tazobactam IVPB.. 3.375 Gram(s) IV Intermittent every 12 hours  senna 2 Tablet(s) Oral at bedtime      Allergies    No Known Drug Allergies  [place] (Rash)    Intolerances        SOCIAL HISTORY:  Denies ETOh,Smoking,     FAMILY HISTORY:      REVIEW OF SYSTEMS:    CONSTITUTIONAL: No weakness, fevers or chills  EYES/ENT: No visual changes;  no throat pain   NECK: No pain or stiffness  RESPIRATORY: No cough, wheezing, hemoptysis; No shortness of breath  CARDIOVASCULAR: No chest pain or palpitations  GASTROINTESTINAL: No abdominal or epigastric pain. No nausea, vomiting,     No diarrhea or constipation. No melena                                      11.1   10.99 )-----------( 464      ( 14 Oct 2023 06:04 )             35.4       CBC Full  -  ( 14 Oct 2023 06:04 )  WBC Count : 10.99 K/uL  RBC Count : 3.78 M/uL  Hemoglobin : 11.1 g/dL  Hematocrit : 35.4 %  Platelet Count - Automated : 464 K/uL  Mean Cell Volume : 93.7 fl  Mean Cell Hemoglobin : 29.4 pg  Mean Cell Hemoglobin Concentration : 31.4 gm/dL  Auto Neutrophil # : 6.98 K/uL  Auto Lymphocyte # : 2.12 K/uL  Auto Monocyte # : 0.91 K/uL  Auto Eosinophil # : 0.49 K/uL  Auto Basophil # : 0.12 K/uL  Auto Neutrophil % : 63.4 %  Auto Lymphocyte % : 19.3 %  Auto Monocyte % : 8.3 %  Auto Eosinophil % : 4.5 %  Auto Basophil % : 1.1 %      10-14    133<L>  |  95<L>  |  42<H>  ----------------------------<  253<H>  4.3   |  24  |  8.40<H>    Ca    8.8      14 Oct 2023 06:04  Phos  4.6     10-14  Mg     2.5     10-14    TPro  7.4  /  Alb  2.0<L>  /  TBili  0.6  /  DBili  x   /  AST  14<L>  /  ALT  14  /  AlkPhos  136<H>  10-14      CAPILLARY BLOOD GLUCOSE      POCT Blood Glucose.: 276 mg/dL (14 Oct 2023 07:32)  POCT Blood Glucose.: 205 mg/dL (13 Oct 2023 21:30)  POCT Blood Glucose.: 226 mg/dL (13 Oct 2023 17:16)      Vital Signs Last 24 Hrs  T(C): 36.8 (14 Oct 2023 10:05), Max: 37.1 (13 Oct 2023 16:01)  T(F): 98.3 (14 Oct 2023 10:05), Max: 98.7 (13 Oct 2023 16:01)  HR: 75 (14 Oct 2023 10:42) (63 - 88)  BP: 107/60 (14 Oct 2023 10:42) (70/43 - 186/84)  BP(mean): 76 (14 Oct 2023 10:42) (47 - 146)  RR: 17 (14 Oct 2023 10:42) (7 - 23)  SpO2: 94% (14 Oct 2023 10:42) (93% - 100%)    Parameters below as of 14 Oct 2023 10:05  Patient On (Oxygen Delivery Method): room air        Urinalysis Basic - ( 14 Oct 2023 06:04 )    Color: x / Appearance: x / SG: x / pH: x  Gluc: 253 mg/dL / Ketone: x  / Bili: x / Urobili: x   Blood: x / Protein: x / Nitrite: x   Leuk Esterase: x / RBC: x / WBC x   Sq Epi: x / Non Sq Epi: x / Bacteria: x            PHYSICAL EXAM:    Constitutional: NAD  HEENT: conjunctive   clear   Neck:  No JVD  Respiratory: CTAB  Cardiovascular: S1 and S2  Gastrointestinal: BS+, soft, NT/ND  Extremities: No peripheral edema  Neurological: A/O x 3, no focal deficits  Psychiatric: Normal mood, normal affect  : No [place]  Skin: No rashes   S/P BKA (below knee amputation) bilateral  AV fistula  LABS:

## 2023-10-14 NOTE — PROGRESS NOTE ADULT - ASSESSMENT
REASON FOR VISIT  .. Management of problems listed below        REVIEW OF SYMPTOMS   Able to give ROS  Yes     RELIABILITY +/-   CONSTITUTIONAL Weakness Yes    ENDOCRINE  No heat or cold intolerance    ALLERGY No hives  Sore throat No stridor  RESP Shortness of breath YES   NEURO New weakness No   CARDIAC   Palpitations No         PHYSICAL EXAM    HEENT Unremarkable  atraumatic   RESP Fair air entry  Harsh breath sound   CARDIAC S1 S2 No S3     NO JVD    ABDOMEN No hepatosplenomegaly   bl bka  NO rash       GENERAL DATA .     GOC.  .. 10/10/2023 full code  ICU STAY. .. 10/11/2023   COVID. ..  scv2 10/10/2023 (-)  BEST PRACTICE ISSUES.    HOB ELEVATN. .. Yes  DVT PPLX. ..  10/10/2023 [facility]    SPEECH SWALLOW RECOMMENDATIONS.    ..        DIET.   ..  10/11 renal restrn   ..  10/10/2023 npo   IV fl ...   BOLUS.   .. 10/11/2023 4a ns 500   .. 10/10/2023 4p ns 250   .. 10/10/2023 12p ns 500  .. 10/10/2023 9a ns 1000   STRESS ULCER PPLX. ..    10/10/2023 protonix 40  INFECTION PPLX. ..   10/13 mupirocin 2%   ALLGY. ..   turkey                      WT. ..  10/10/2023 59  BMI. ..      PROCEDURES.  .. 10/11/2023 l knee disarticulation   PRESENT ON ADMISSION.    ABGS.   .     VS/ PO/IO/ VENT/ DRIPS.  10/14/2023 afeb 89 126/56   10/14/2023 ra 100%   10/14/2023 3p nore .04 m/k/m     DOA C/C.   10/10/2023 Increasing weakness 2 weeks fevermalaise     INITIAL PRESENTATION.   . 10/10/2023 49yo male with weakness for 2 weeks. pt is dialysis patient M/W/F last dialysis was yesterday but pt has been feeling weak for the last 2 weeks, no cough, fever, chills, no vomiting or diarrhea, pt also has had an open wound under his left thigh that has been neglected for the last few weeks, draining pus and with redness  . Pulm critical care consulted as BP low     PMH.   . DM  . ESRD  . HD   . AV fistula  . BL BKA     HOME MEDS.   COURSE.     PROBLEMS/ASSESSMENT/RECOMMENDATIONS (A/R).    . Vanco level   .. 10/11-10/12-10/13-10/14/2023   .. vanco 15.6- 19.8 - 26 - 24     INFECTION.  . Skin soft tissue infection   . Necrotizing fascitis 10/10 CT   .. W 10/10-10/11-10/12-10/13-10/14/2023   .. w 19 - 17.7- 22- 13.8 - 10.9   .. ct lle 10/10   .... sp remote bka   .... no cortical eroison or aggressive periosteal reaction   .... deep st ulceration along post medial margin knee with surrounding st intramuscular and perifascial st emohysema tracking cranially dd necrotizing fascitis    .. Flu ab 10/10/2023 (-)  .. rsv 10/10/2023 (-)  .. MRSA 10/11 (+)   .. surg cs 10/11  .... 1) Mod e coli   .... 2) few strep agalct  gp b   .. bc 10/10 (-)   .. skin cs 10/10 strept agalacticae   .. 10/10 9a vanco 1g givn   .. 10/12 vanco 750 givn   .. 10/10/2023 zosyn     CARDIAC.  .. La 10/10/2023 la 1.1   .. Monitor     .. Shock  .. 10/10/2023 5:30 PM Has been given 2l fluid challenge  .. 10/10/2023 5:30 PM ra po 99%  .. 10/10/2023 will cautiously try more fluids  .. 10/12 10a nore 8 in 250   .. 10/11 midodrine 10.3   .. 10/10/2023 If continues to have map below 65 will need icu for iv vasopressors   .. 10/13/2023 On iv nore started 10/12  Target MAP 65 (+)     HEMAT.  .. Hb 10/10-10/11-10/12-10/13-10/11/2023   .. Hb 10.3 - 9.2 - 10-10.3 - 11.1   .. Inr 10/10/2023 inr 129   .. Monitor     RENAL.  . ESRD  .. Na 10/10/2023 Na 136  .. Cr 10/10/2023 Cr 8.2   .. HD as guided by renal     . NECROTISZING FASCITIS   .. ct lle 10/10   .... sp remote bka   .... no cortical eroison or aggressive periosteal reaction   .... deep st ulceration along post medial margin knee with surrounding st intramuscular and perifascial st emohysema tracking cranially dd necrotizing fascitis    .. 10/11/2023 10:30 AM Surgery called as soon as radiologist reported NF   .. Pt is cleared for urgent surgery from [facility]   .. 10/11 l knee disarticulation done     ADMISSION SUMMARY.   . 48 m admitted 10/10 with shock found to have necroitizing fascitis     MAIN ISSUES   . SHOCK   . SEPSIS   . SKIN SOFT TISSUE CELLULITIS  10/10 l post knee pressure ulcer   . NECROITIZING FASCITIS 10/10 L knee ct   . l KNEE DISARTICULATION 10/11   .. 10/11 surg strep agalacticae gp b   . ESRD     OVERALL PLAN  . SSTI On vanco started 10/10 On Vanco started 10/10   . NECROTIZING FASCITIS 10/11/2023   . SHOCK 10/12/2023 Nore  Target MAP 65 (+)  . SP Urgent DISARTICULATION l knee 10/11   .. RTOR 1 week for AKA closure     TIME SPENT.   . Over 36 minutes aggregate care time spent on encounter; activities included   direct patient care, counseling and/or coordinating care reviewing notes, lab data/ imaging , discussion with multidisciplinary team/ patient  /family and explaining in detail risks, benefits, alternatives  of the recommendations     MICHAEL TIRADO 48 m 10/10/2023 [address] DR ELENA SCHMUTER DR MOHSEN PAHLAVAN

## 2023-10-14 NOTE — PROGRESS NOTE ADULT - SUBJECTIVE AND OBJECTIVE BOX
INTERVAL HPI/OVERNIGHT EVENTS: No acute events.    SUBJECTIVE: Patient seen and examined at bedside.     ROS:  Constitutional: no fever, chills, fatigue  Neuro: no headache, numbness, weakness, dizziness  Resp: no cough, wheezing, shortness of breath  CVS: no chest pain, palpitations, leg swelling  GI: no abdominal pain, nausea, vomiting, diarrhea   : no dysuria, frequency, incontinence  Skin: no itching, burning, rashes, or lesions   Msk: +LLE pain      OBJECTIVE:    VITAL SIGNS:  ICU Vital Signs Last 24 Hrs  T(C): 36.8 (14 Oct 2023 10:05), Max: 37.1 (13 Oct 2023 16:01)  T(F): 98.3 (14 Oct 2023 10:05), Max: 98.7 (13 Oct 2023 16:01)  HR: 75 (14 Oct 2023 10:42) (63 - 88)  BP: 107/60 (14 Oct 2023 10:42) (70/43 - 186/84)  BP(mean): 76 (14 Oct 2023 10:42) (47 - 146)  ABP: --  ABP(mean): --  RR: 17 (14 Oct 2023 10:42) (7 - 23)  SpO2: 94% (14 Oct 2023 10:42) (93% - 100%)    O2 Parameters below as of 14 Oct 2023 10:05  Patient On (Oxygen Delivery Method): room air              10-13 @ 07:01  -  10-14 @ 07:00  --------------------------------------------------------  IN: 980.4 mL / OUT: 0 mL / NET: 980.4 mL    10-14 @ 07:01  -  10-14 @ 11:03  --------------------------------------------------------  IN: 254.4 mL / OUT: 0 mL / NET: 254.4 mL      CAPILLARY BLOOD GLUCOSE      POCT Blood Glucose.: 276 mg/dL (14 Oct 2023 07:32)      PHYSICAL EXAM:  General: NAD, comfortable  HEENT: NCAT, clear conjunctiva, no scleral icterus  Respiratory: CTA b/l, no wheezing, rhonchi, rales  Cardiovascular: RRR, normal S1S2, no M/R/G  Abdomen: soft, NT/ND, bowel sounds present  Extremities: LLE dressings C/D/I, +mild TTP, well-healed s/p R BKA and R hand 3rd digit amputation changes  Neurology: awake and alert    MEDICATIONS:  MEDICATIONS  (STANDING):  atorvastatin 40 milliGRAM(s) Oral at bedtime  dextrose 5%. 1000 milliLiter(s) (50 mL/Hr) IV Continuous <Continuous>  dextrose 5%. 1000 milliLiter(s) (100 mL/Hr) IV Continuous <Continuous>  dextrose 50% Injectable 25 Gram(s) IV Push once  dextrose 50% Injectable 12.5 Gram(s) IV Push once  dextrose 50% Injectable 25 Gram(s) IV Push once  dorzolamide 2%/timolol 0.5% Ophthalmic Solution 1 Drop(s) Both EYES two times a day  glucagon  Injectable 1 milliGRAM(s) IntraMuscular once  heparin   Injectable 5000 Unit(s) SubCutaneous every 12 hours  insulin glargine Injectable (LANTUS) 15 Unit(s) SubCutaneous at bedtime  insulin lispro (ADMELOG) corrective regimen sliding scale   SubCutaneous Before meals and at bedtime  insulin lispro Injectable (ADMELOG) 7 Unit(s) SubCutaneous three times a day before meals  lactobacillus acidophilus 1 Tablet(s) Oral every 12 hours  midodrine. 20 milliGRAM(s) Oral three times a day  mupirocin 2% Nasal 1 Application(s) Both Nostrils two times a day  norepinephrine Infusion 0.05 MICROgram(s)/kG/Min (6 mL/Hr) IV Continuous <Continuous>  piperacillin/tazobactam IVPB.. 3.375 Gram(s) IV Intermittent every 12 hours  polyethylene glycol 3350 17 Gram(s) Oral daily  senna 2 Tablet(s) Oral at bedtime    MEDICATIONS  (PRN):  acetaminophen     Tablet .. 650 milliGRAM(s) Oral every 4 hours PRN Temp greater or equal to 38C (100.4F), Mild Pain (1 - 3)  aluminum hydroxide/magnesium hydroxide/simethicone Suspension 30 milliLiter(s) Oral every 4 hours PRN Dyspepsia  HYDROmorphone  Injectable 0.5 milliGRAM(s) IV Push every 4 hours PRN Severe Pain (7 - 10)  melatonin 3 milliGRAM(s) Oral at bedtime PRN Insomnia  ondansetron Injectable 4 milliGRAM(s) IV Push every 8 hours PRN Nausea and/or Vomiting  oxyCODONE    IR 5 milliGRAM(s) Oral every 6 hours PRN Moderate Pain (4 - 6)      ALLERGIES:  Allergies    No Known Drug Allergies  [place] (Rash)    Intolerances        LABS:                        11.1   10.99 )-----------( 464      ( 14 Oct 2023 06:04 )             35.4     10-14    133<L>  |  95<L>  |  42<H>  ----------------------------<  253<H>  4.3   |  24  |  8.40<H>    Ca    8.8      14 Oct 2023 06:04  Phos  4.6     10-14  Mg     2.5     10-14    TPro  7.4  /  Alb  2.0<L>  /  TBili  0.6  /  DBili  x   /  AST  14<L>  /  ALT  14  /  AlkPhos  136<H>  10-14      Urinalysis Basic - ( 14 Oct 2023 06:04 )    Color: x / Appearance: x / SG: x / pH: x  Gluc: 253 mg/dL / Ketone: x  / Bili: x / Urobili: x   Blood: x / Protein: x / Nitrite: x   Leuk Esterase: x / RBC: x / WBC x   Sq Epi: x / Non Sq Epi: x / Bacteria: x        RADIOLOGY & ADDITIONAL TESTS: None in 24 hours.    Lines/Tubes:  -PIVs  -R brachial AVF  -R EJ      GLOBAL ISSUE/BEST PRACTICE  Analgesia: Y  Sedation: Y  HOB elevation: yes  Stress ulcer prophylaxis: Y  VTE prophylaxis: Y  Glycemic control: Y  Nutrition: Y    CODE STATUS: Full Code

## 2023-10-14 NOTE — PROGRESS NOTE ADULT - ASSESSMENT
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 Sin/Dr. Singh) Presents to ED [facility] 10/10 with weakness for 2 weeks. 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 Wound care consult and ID consults are requested .

## 2023-10-14 NOTE — PROGRESS NOTE ADULT - SUBJECTIVE AND OBJECTIVE BOX
Interval History:    CENTRAL LINE:   [  ] YES       [  ] NO  FLORES:                 [  ] YES       [  ] NO         REVIEW OF SYSTEMS:  All Systems below were reviewed and are negative [  ]  HEENT:  ID:  Pulmonary:  Cardiac:  GI:  Renal:  Musculoskeletal:  All other systems above were reviewed and are negative   [  ]      MEDICATIONS  (STANDING):  atorvastatin 40 milliGRAM(s) Oral at bedtime  dextrose 5%. 1000 milliLiter(s) (50 mL/Hr) IV Continuous <Continuous>  dextrose 5%. 1000 milliLiter(s) (100 mL/Hr) IV Continuous <Continuous>  dextrose 50% Injectable 25 Gram(s) IV Push once  dextrose 50% Injectable 25 Gram(s) IV Push once  dextrose 50% Injectable 12.5 Gram(s) IV Push once  dorzolamide 2%/timolol 0.5% Ophthalmic Solution 1 Drop(s) Both EYES two times a day  glucagon  Injectable 1 milliGRAM(s) IntraMuscular once  heparin   Injectable 5000 Unit(s) SubCutaneous every 12 hours  insulin glargine Injectable (LANTUS) 15 Unit(s) SubCutaneous at bedtime  insulin lispro (ADMELOG) corrective regimen sliding scale   SubCutaneous Before meals and at bedtime  insulin lispro Injectable (ADMELOG) 7 Unit(s) SubCutaneous three times a day before meals  lactobacillus acidophilus 1 Tablet(s) Oral every 12 hours  midodrine. 20 milliGRAM(s) Oral three times a day  mupirocin 2% Nasal 1 Application(s) Both Nostrils two times a day  norepinephrine Infusion 0.05 MICROgram(s)/kG/Min (6 mL/Hr) IV Continuous <Continuous>  piperacillin/tazobactam IVPB.. 3.375 Gram(s) IV Intermittent every 12 hours  polyethylene glycol 3350 17 Gram(s) Oral daily  senna 2 Tablet(s) Oral at bedtime    MEDICATIONS  (PRN):  acetaminophen     Tablet .. 650 milliGRAM(s) Oral every 4 hours PRN Temp greater or equal to 38C (100.4F), Mild Pain (1 - 3)  aluminum hydroxide/magnesium hydroxide/simethicone Suspension 30 milliLiter(s) Oral every 4 hours PRN Dyspepsia  HYDROmorphone  Injectable 0.5 milliGRAM(s) IV Push every 4 hours PRN Severe Pain (7 - 10)  melatonin 3 milliGRAM(s) Oral at bedtime PRN Insomnia  ondansetron Injectable 4 milliGRAM(s) IV Push every 8 hours PRN Nausea and/or Vomiting  oxyCODONE    IR 5 milliGRAM(s) Oral every 6 hours PRN Moderate Pain (4 - 6)      Vital Signs Last 24 Hrs  T(C): 36.8 (14 Oct 2023 16:11), Max: 36.9 (14 Oct 2023 00:45)  T(F): 98.2 (14 Oct 2023 16:11), Max: 98.5 (14 Oct 2023 07:48)  HR: 75 (14 Oct 2023 16:16) (63 - 93)  BP: 128/66 (14 Oct 2023 16:16) (74/32 - 186/74)  BP(mean): 90 (14 Oct 2023 16:16) (47 - 146)  RR: 14 (14 Oct 2023 16:16) (7 - 24)  SpO2: 100% (14 Oct 2023 16:16) (94% - 100%)    Parameters below as of 14 Oct 2023 15:00  Patient On (Oxygen Delivery Method): room air        I&O's Summary    13 Oct 2023 07:01  -  14 Oct 2023 07:00  --------------------------------------------------------  IN: 980.4 mL / OUT: 0 mL / NET: 980.4 mL    14 Oct 2023 07:01  -  14 Oct 2023 16:21  --------------------------------------------------------  IN: 642.8 mL / OUT: 0 mL / NET: 642.8 mL        PHYSICAL EXAM:  HEENT: NC/AT; PERRLA  Neck: Soft; no tenderness  Lungs: CTA bilaterally; no wheezing.   Heart:  Abdomen:  Genital/ Rectal:  Extremities:  Neurologic:  Vascular:      LABORATORY:    CBC Full  -  ( 14 Oct 2023 06:04 )  WBC Count : 10.99 K/uL  RBC Count : 3.78 M/uL  Hemoglobin : 11.1 g/dL  Hematocrit : 35.4 %  Platelet Count - Automated : 464 K/uL  Mean Cell Volume : 93.7 fl  Mean Cell Hemoglobin : 29.4 pg  Mean Cell Hemoglobin Concentration : 31.4 gm/dL  Auto Neutrophil # : 6.98 K/uL  Auto Lymphocyte # : 2.12 K/uL  Auto Monocyte # : 0.91 K/uL  Auto Eosinophil # : 0.49 K/uL  Auto Basophil # : 0.12 K/uL  Auto Neutrophil % : 63.4 %  Auto Lymphocyte % : 19.3 %  Auto Monocyte % : 8.3 %  Auto Eosinophil % : 4.5 %  Auto Basophil % : 1.1 %          10-14    133<L>  |  95<L>  |  42<H>  ----------------------------<  253<H>  4.3   |  24  |  8.40<H>    Ca    8.8      14 Oct 2023 06:04  Phos  4.6     10-14  Mg     2.5     10-14    TPro  7.4  /  Alb  2.0<L>  /  TBili  0.6  /  DBili  x   /  AST  14<L>  /  ALT  14  /  AlkPhos  136<H>  10-14          Assessment and Plan:          Nii Alonzo MD   [phone].    He is afebrile  Comfortable.      MEDICATIONS  (STANDING):  atorvastatin 40 milliGRAM(s) Oral at bedtime  dextrose 5%. 1000 milliLiter(s) (50 mL/Hr) IV Continuous <Continuous>  dextrose 5%. 1000 milliLiter(s) (100 mL/Hr) IV Continuous <Continuous>  dextrose 50% Injectable 25 Gram(s) IV Push once  dextrose 50% Injectable 25 Gram(s) IV Push once  dextrose 50% Injectable 12.5 Gram(s) IV Push once  dorzolamide 2%/timolol 0.5% Ophthalmic Solution 1 Drop(s) Both EYES two times a day  glucagon  Injectable 1 milliGRAM(s) IntraMuscular once  heparin   Injectable 5000 Unit(s) SubCutaneous every 12 hours  insulin glargine Injectable (LANTUS) 15 Unit(s) SubCutaneous at bedtime  insulin lispro (ADMELOG) corrective regimen sliding scale   SubCutaneous Before meals and at bedtime  insulin lispro Injectable (ADMELOG) 7 Unit(s) SubCutaneous three times a day before meals  lactobacillus acidophilus 1 Tablet(s) Oral every 12 hours  midodrine. 20 milliGRAM(s) Oral three times a day  mupirocin 2% Nasal 1 Application(s) Both Nostrils two times a day  norepinephrine Infusion 0.05 MICROgram(s)/kG/Min (6 mL/Hr) IV Continuous <Continuous>  piperacillin/tazobactam IVPB.. 3.375 Gram(s) IV Intermittent every 12 hours  polyethylene glycol 3350 17 Gram(s) Oral daily  senna 2 Tablet(s) Oral at bedtime    MEDICATIONS  (PRN):  acetaminophen     Tablet .. 650 milliGRAM(s) Oral every 4 hours PRN Temp greater or equal to 38C (100.4F), Mild Pain (1 - 3)  aluminum hydroxide/magnesium hydroxide/simethicone Suspension 30 milliLiter(s) Oral every 4 hours PRN Dyspepsia  HYDROmorphone  Injectable 0.5 milliGRAM(s) IV Push every 4 hours PRN Severe Pain (7 - 10)  melatonin 3 milliGRAM(s) Oral at bedtime PRN Insomnia  ondansetron Injectable 4 milliGRAM(s) IV Push every 8 hours PRN Nausea and/or Vomiting  oxyCODONE    IR 5 milliGRAM(s) Oral every 6 hours PRN Moderate Pain (4 - 6)      Vital Signs Last 24 Hrs  T(C): 36.8 (14 Oct 2023 16:11), Max: 36.9 (14 Oct 2023 00:45)  T(F): 98.2 (14 Oct 2023 16:11), Max: 98.5 (14 Oct 2023 07:48)  HR: 75 (14 Oct 2023 16:16) (63 - 93)  BP: 128/66 (14 Oct 2023 16:16) (74/32 - 186/74)  BP(mean): 90 (14 Oct 2023 16:16) (47 - 146)  RR: 14 (14 Oct 2023 16:16) (7 - 24)  SpO2: 100% (14 Oct 2023 16:16) (94% - 100%)    Parameters below as of 14 Oct 2023 15:00  Patient On (Oxygen Delivery Method): room air        I&O's Summary    13 Oct 2023 07:01  -  14 Oct 2023 07:00  --------------------------------------------------------  IN: 980.4 mL / OUT: 0 mL / NET: 980.4 mL    14 Oct 2023 07:01  -  14 Oct 2023 16:21  --------------------------------------------------------  IN: 642.8 mL / OUT: 0 mL / NET: 642.8 mL        PHYSICAL EXAM:  HEENT: NC/AT; PERRLA  Neck: Soft; no tenderness  Lungs: Coarse BS  bilaterally; no wheezing.   Heart: RRR, no murmurs.  Abdomen: Soft, no tenderness.   Genital/ Rectal: No lackey catheter.   Extremities:  R AKA stump wound with clean dressings.   Neurologic: Awake.      LABORATORY:    CBC Full  -  ( 14 Oct 2023 06:04 )  WBC Count : 10.99 K/uL  RBC Count : 3.78 M/uL  Hemoglobin : 11.1 g/dL  Hematocrit : 35.4 %  Platelet Count - Automated : 464 K/uL  Mean Cell Volume : 93.7 fl  Mean Cell Hemoglobin : 29.4 pg  Mean Cell Hemoglobin Concentration : 31.4 gm/dL  Auto Neutrophil # : 6.98 K/uL  Auto Lymphocyte # : 2.12 K/uL  Auto Monocyte # : 0.91 K/uL  Auto Eosinophil # : 0.49 K/uL  Auto Basophil # : 0.12 K/uL  Auto Neutrophil % : 63.4 %  Auto Lymphocyte % : 19.3 %  Auto Monocyte % : 8.3 %  Auto Eosinophil % : 4.5 %  Auto Basophil % : 1.1 %          10-14    133<L>  |  95<L>  |  42<H>  ----------------------------<  253<H>  4.3   |  24  |  8.40<H>    Ca    8.8      14 Oct 2023 06:04  Phos  4.6     10-14  Mg     2.5     10-14    TPro  7.4  /  Alb  2.0<L>  /  TBili  0.6  /  DBili  x   /  AST  14<L>  /  ALT  14  /  AlkPhos  136<H>  10-14      Assessment and Plan:    1. Left posterior thigh infected wound with necrotizing fasciitis, s/p debridement and left AKA.   2. Sepsis, likely due to infected wound.  3. ESRD on HD  4. Bilateral BKA.      Wound cultures grew group B Strep and E coli.   Continue IV Zosyn q12h for now. No need for further IV Vancomycin.   . Waiting for more surgery of L AKA stump next week.   Wound care daily as per surgery.  . Anticipate oral antibiotics on discharge.            Nii Alonzo MD   [phone].

## 2023-10-14 NOTE — PROGRESS NOTE ADULT - SUBJECTIVE AND OBJECTIVE BOX
Patient is a 48y old  Male who presents with a chief complaint of weakness (13 Oct 2023 06:11)    Pt without c/o fevers or chills  Reports some pain and discomfort in the area   Vasopressors on/off    MEDICATIONS  (STANDING):  atorvastatin 40 milliGRAM(s) Oral at bedtime  dextrose 5%. 1000 milliLiter(s) (100 mL/Hr) IV Continuous <Continuous>  dextrose 5%. 1000 milliLiter(s) (50 mL/Hr) IV Continuous <Continuous>  dextrose 50% Injectable 25 Gram(s) IV Push once  dextrose 50% Injectable 12.5 Gram(s) IV Push once  dextrose 50% Injectable 25 Gram(s) IV Push once  dorzolamide 2%/timolol 0.5% Ophthalmic Solution 1 Drop(s) Both EYES two times a day  glucagon  Injectable 1 milliGRAM(s) IntraMuscular once  heparin   Injectable 5000 Unit(s) SubCutaneous every 12 hours  insulin glargine Injectable (LANTUS) 10 Unit(s) SubCutaneous at bedtime  insulin lispro (ADMELOG) corrective regimen sliding scale   SubCutaneous Before meals and at bedtime  insulin lispro Injectable (ADMELOG) 5 Unit(s) SubCutaneous three times a day before meals  lactobacillus acidophilus 1 Tablet(s) Oral every 12 hours  midodrine. 10 milliGRAM(s) Oral three times a day  mupirocin 2% Nasal 1 Application(s) Both Nostrils two times a day  norepinephrine Infusion 0.05 MICROgram(s)/kG/Min (6 mL/Hr) IV Continuous <Continuous>  piperacillin/tazobactam IVPB.. 3.375 Gram(s) IV Intermittent every 12 hours  polyethylene glycol 3350 17 Gram(s) Oral daily  senna 2 Tablet(s) Oral at bedtime    MEDICATIONS  (PRN):  acetaminophen     Tablet .. 650 milliGRAM(s) Oral every 4 hours PRN Temp greater or equal to 38C (100.4F), Mild Pain (1 - 3)  aluminum hydroxide/magnesium hydroxide/simethicone Suspension 30 milliLiter(s) Oral every 4 hours PRN Dyspepsia  HYDROmorphone  Injectable 0.5 milliGRAM(s) IV Push every 4 hours PRN Severe Pain (7 - 10)  melatonin 3 milliGRAM(s) Oral at bedtime PRN Insomnia  ondansetron Injectable 4 milliGRAM(s) IV Push every 8 hours PRN Nausea and/or Vomiting  oxyCODONE    IR 5 milliGRAM(s) Oral every 6 hours PRN Moderate Pain (4 - 6)      Allergies  No Known Drug Allergies  [place] (Rash)    Vital Signs Last 24 Hrs  T(C): 36.6 (13 Oct 2023 08:00), Max: 36.6 (12 Oct 2023 13:00)  T(F): 97.8 (13 Oct 2023 08:00), Max: 97.9 (12 Oct 2023 20:38)  HR: 61 (13 Oct 2023 09:15) (58 - 82)  BP: 100/54 (13 Oct 2023 09:15) (67/41 - 193/90)  BP(mean): 73 (13 Oct 2023 09:15) (50 - 129)  RR: 16 (13 Oct 2023 09:15) (8 - 25)  SpO2: 100% (13 Oct 2023 09:15) (80% - 100%)      I&O's Detail    12 Oct 2023 07:01  -  13 Oct 2023 07:00  --------------------------------------------------------  IN:    IV PiggyBack: 100 mL    Norepinephrine: 18 mL    Oral Fluid: 480 mL  Total IN: 598 mL    OUT:    Other (mL): 2000 mL  Total OUT: 2000 mL    Total NET: -1402 mL      13 Oct 2023 07:01  -  13 Oct 2023 10:01  --------------------------------------------------------  IN:    Norepinephrine: 4.8 mL    Oral Fluid: 200 mL  Total IN: 204.8 mL    OUT:  Total OUT: 0 mL    Total NET: 204.8 mL    Physical Exam:  General: NAD, resting comfortably in bed  Pulmonary: Nonlabored breathing, no respiratory distress, CTA  Cardiovascular: NSR  Abdominal: soft, NT/ND  Extremities: L stump dressing with betadine/serosang strikethrough. New dressing applied.      LABS:                        10.3   13.81 )-----------( 458      ( 13 Oct 2023 06:10 )             32.7     10-13    133<L>  |  96  |  33<H>  ----------------------------<  354<H>  4.5   |  30  |  6.90<H>    Ca    9.1      13 Oct 2023 06:10  Phos  4.6     10-13  Mg     2.4     10-13    TPro  6.7  /  Alb  2.0<L>  /  TBili  0.7  /  DBili  x   /  AST  7<L>  /  ALT  15  /  AlkPhos  113  10-12      CAPILLARY BLOOD GLUCOSE  POCT Blood Glucose.: 353 mg/dL (13 Oct 2023 07:56)  POCT Blood Glucose.: 347 mg/dL (12 Oct 2023 21:39)  POCT Blood Glucose.: 163 mg/dL (12 Oct 2023 11:32)    RECENT CULTURES:  10-11 .Surgical Swab Surgical Swab XXXX XXXX   Moderate Escherichia coli  Few Gram Positive Cocci in Pairs and Chains    10-10 .Blood Blood-Peripheral XXXX XXXX   No growth at 48 Hours    10-10 .Blood Blood-Peripheral XXXX XXXX   No growth at 48 Hours    10-10 Skin Skin XXXX XXXX   Culture yields growth of greater than 3 colony types of  bacteria,  which may indicate contamination and normal yoana  Call client services within 7 days if further workup is clinically  indicated. Culture includes  Numerous Streptococcus agalactiae (Group B) isolated  Group B streptococci are susceptible to ampicillin,  penicillin and cefazolin, but may be resistant to  erythromycin and clindamycin.

## 2023-10-14 NOTE — PROGRESS NOTE ADULT - ASSESSMENT
47 yo male with multiple comorbidities and bilateral BKA was consulted for necrotizing fascitis    Taken to OR for urgent left knee disarticulation for gas gangrne  POD#3  Hypotension requiring vasopressors; now off    Downtrending WBC  Cont abx as per ID  Daily dressing changes by vascular team  RTOR next week for AKA closure     Dressing changed; betadine soaked kerlex ABD and ACE wrap applied. Pt tolerated well

## 2023-10-14 NOTE — PROGRESS NOTE ADULT - ASSESSMENT
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 Sin/Dr. Singh) Presents to ED [facility] [place] 10/10 with weakness for 2 weeks    anemia  esrd  weakness  DM  BKA  PAD  PVD  Pain    POD 3  vs noted  on ABX  on Opioids for pain  labs reviewed  ICU care in progress    full code  lives with family at home - in [facility] as per renal  pain relief  oral hygiene  skin care  wound care  assist with needs

## 2023-10-14 NOTE — PROGRESS NOTE ADULT - SUBJECTIVE AND OBJECTIVE BOX
PROGRESS NOTE  Patient is a 48y old  Male who presents with a chief complaint of weakness (14 Oct 2023 07:04)    Chart and available morning labs /imaging are reviewed electronically , urgent issues addressed . 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 Sin/Dr. Singh) Presents to ED [facility] 10/10 with weakness for 2 weeks. 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 Wound care consult and ID consults are requested . (10 Oct 2023 12:52)    PAST MEDICAL & SURGICAL HISTORY:  ESRD on dialysis      H/O hypotension      Diabetes mellitus      Leg wound, left      Steal syndrome dialysis vascular access      Gangrene of finger of right hand      PVD (peripheral vascular disease)      Anemia of chronic disease      Constipation      HLD (hyperlipidemia)      S/P BKA (below knee amputation) bilateral      AV fistula          MEDICATIONS  (STANDING):  atorvastatin 40 milliGRAM(s) Oral at bedtime  dextrose 5%. 1000 milliLiter(s) (50 mL/Hr) IV Continuous <Continuous>  dextrose 5%. 1000 milliLiter(s) (100 mL/Hr) IV Continuous <Continuous>  dextrose 50% Injectable 25 Gram(s) IV Push once  dextrose 50% Injectable 25 Gram(s) IV Push once  dextrose 50% Injectable 12.5 Gram(s) IV Push once  dorzolamide 2%/timolol 0.5% Ophthalmic Solution 1 Drop(s) Both EYES two times a day  glucagon  Injectable 1 milliGRAM(s) IntraMuscular once  heparin   Injectable 5000 Unit(s) SubCutaneous every 12 hours  insulin glargine Injectable (LANTUS) 15 Unit(s) SubCutaneous at bedtime  insulin lispro (ADMELOG) corrective regimen sliding scale   SubCutaneous Before meals and at bedtime  insulin lispro Injectable (ADMELOG) 7 Unit(s) SubCutaneous three times a day before meals  lactobacillus acidophilus 1 Tablet(s) Oral every 12 hours  midodrine. 20 milliGRAM(s) Oral three times a day  mupirocin 2% Nasal 1 Application(s) Both Nostrils two times a day  norepinephrine Infusion 0.05 MICROgram(s)/kG/Min (6 mL/Hr) IV Continuous <Continuous>  piperacillin/tazobactam IVPB.. 3.375 Gram(s) IV Intermittent every 12 hours  polyethylene glycol 3350 17 Gram(s) Oral daily  senna 2 Tablet(s) Oral at bedtime    MEDICATIONS  (PRN):  acetaminophen     Tablet .. 650 milliGRAM(s) Oral every 4 hours PRN Temp greater or equal to 38C (100.4F), Mild Pain (1 - 3)  aluminum hydroxide/magnesium hydroxide/simethicone Suspension 30 milliLiter(s) Oral every 4 hours PRN Dyspepsia  HYDROmorphone  Injectable 0.5 milliGRAM(s) IV Push every 4 hours PRN Severe Pain (7 - 10)  melatonin 3 milliGRAM(s) Oral at bedtime PRN Insomnia  ondansetron Injectable 4 milliGRAM(s) IV Push every 8 hours PRN Nausea and/or Vomiting  oxyCODONE    IR 5 milliGRAM(s) Oral every 6 hours PRN Moderate Pain (4 - 6)      OBJECTIVE    T(C): 36.9 (10-14-23 @ 07:48), Max: 37.1 (10-13-23 @ 16:01)  HR: 69 (10-14-23 @ 09:00) (62 - 88)  BP: 95/45 (10-14-23 @ 09:00) (70/43 - 186/84)  RR: 15 (10-14-23 @ 09:00) (7 - 23)  SpO2: 100% (10-14-23 @ 09:00) (93% - 100%)  Wt(kg): --  I&O's Summary    13 Oct 2023 07:01  -  14 Oct 2023 07:00  --------------------------------------------------------  IN: 980.4 mL / OUT: 0 mL / NET: 980.4 mL    14 Oct 2023 07:01  -  14 Oct 2023 09:51  --------------------------------------------------------  IN: 4.8 mL / OUT: 0 mL / NET: 4.8 mL          REVIEW OF SYSTEMS:  CONSTITUTIONAL: No fever, weight loss, or fatigue  EYES: No eye pain, visual disturbances, or discharge  ENMT:   No sinus or throat pain  NECK: No pain or stiffness  RESPIRATORY: No cough, wheezing, chills or hemoptysis; No shortness of breath  CARDIOVASCULAR: No chest pain, palpitations, dizziness, or leg swelling  GASTROINTESTINAL: No abdominal pain. No nausea, vomiting; No diarrhea or constipation. No melena or hematochezia.  GENITOURINARY: No dysuria, frequency, hematuria, or incontinence  NEUROLOGICAL: No headaches, memory loss, loss of strength, numbness, or tremors  SKIN: No itching, burning, rashes, or lesions   MUSCULOSKELETAL: No joint pain or swelling; No muscle, back, or extremity pain    PHYSICAL EXAM:  Appearance: NAD. VS past 24 hrs -as above   HEENT:   Moist oral mucosa. Conjunctiva clear b/l.   Neck : supple  Respiratory: Lungs CTAB.  Gastrointestinal:  Soft, nontender. No rebound. No rigidity. BS present	  Cardiovascular: RRR ,S1S2 present  Neurologic: Non-focal. Moving all extremities.  Extremities: No edema. No erythema. No calf tenderness.  Skin: No rashes, No ecchymoses, No cyanosis.	  wounds ,skin lesions-See skin assesment flow sheet   LABS:                        11.1   10.99 )-----------( 464      ( 14 Oct 2023 06:04 )             35.4     10-14    133<L>  |  95<L>  |  42<H>  ----------------------------<  253<H>  4.3   |  24  |  8.40<H>    Ca    8.8      14 Oct 2023 06:04  Phos  4.6     10-14  Mg     2.5     10-14    TPro  7.4  /  Alb  2.0<L>  /  TBili  0.6  /  DBili  x   /  AST  14<L>  /  ALT  14  /  AlkPhos  136<H>  10-14    CAPILLARY BLOOD GLUCOSE      POCT Blood Glucose.: 276 mg/dL (14 Oct 2023 07:32)  POCT Blood Glucose.: 205 mg/dL (13 Oct 2023 21:30)  POCT Blood Glucose.: 226 mg/dL (13 Oct 2023 17:16)  POCT Blood Glucose.: 299 mg/dL (13 Oct 2023 11:08)      Urinalysis Basic - ( 14 Oct 2023 06:04 )    Color: x / Appearance: x / SG: x / pH: x  Gluc: 253 mg/dL / Ketone: x  / Bili: x / Urobili: x   Blood: x / Protein: x / Nitrite: x   Leuk Esterase: x / RBC: x / WBC x   Sq Epi: x / Non Sq Epi: x / Bacteria: x        Culture - Surgical Swab (collected 11 Oct 2023 13:51)  Source: .Surgical Swab Surgical Swab  Preliminary Report (13 Oct 2023 12:49):    Moderate Escherichia coli    Few Streptococcus agalactiae (Group B) isolated    Group B streptococci are susceptible to ampicillin,    penicillin and cefazolin, but may be resistant to    erythromycin and clindamycin.    Culture - Blood (collected 10 Oct 2023 12:00)  Source: .Blood Blood-Peripheral  Preliminary Report (13 Oct 2023 19:01):    No growth at 72 Hours    Culture - Blood (collected 10 Oct 2023 11:15)  Source: .Blood Blood-Peripheral  Preliminary Report (13 Oct 2023 16:00):    No growth at 72 Hours    Culture - Other (collected 10 Oct 2023 10:30)  Source: Skin Skin  Final Report (13 Oct 2023 15:09):    Culture yields growth of greater than 3 colony types of    bacteria,  which may indicate contamination and normal yoana    Call client services within 7 days if further workup is clinically    indicated. Culture includes    Numerous Streptococcus agalactiae (Group B) isolated    Group B streptococci are susceptible to ampicillin,    penicillin and cefazolin, but may be resistant to    erythromycin and clindamycin.    Numerous Escherichia coli  Organism: Escherichia coli (13 Oct 2023 15:09)  Organism: Escherichia coli (13 Oct 2023 15:09)      RADIOLOGY & ADDITIONAL TESTS:   reviewed elctronically  ASSESSMENT/PLAN: 	     PROGRESS NOTE  Patient is a 48y old  Male who presents with a chief complaint of weakness (14 Oct 2023 07:04)    Chart and available morning labs /imaging are reviewed electronically , urgent issues addressed . More information  is being added upon completion of rounds , when more information is collected and management discussed with consultants , medical staff and social service/case management on the floor   OVERNIGHT  No new issues reported by medical staff . All above noted Patient is resting in a bed comfortably .Getting HD session .No distress noted   No acute events.  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 Sin/Dr. Singh) Presents to ED [facility] 10/10 with weakness for 2 weeks. 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 Wound care consult and ID consults are requested . (10 Oct 2023 12:52)    PAST MEDICAL & SURGICAL HISTORY:  ESRD on dialysis      H/O hypotension      Diabetes mellitus      Leg wound, left      Steal syndrome dialysis vascular access      Gangrene of finger of right hand      PVD (peripheral vascular disease)      Anemia of chronic disease      Constipation      HLD (hyperlipidemia)      S/P BKA (below knee amputation) bilateral      AV fistula          MEDICATIONS  (STANDING):  atorvastatin 40 milliGRAM(s) Oral at bedtime  dextrose 5%. 1000 milliLiter(s) (50 mL/Hr) IV Continuous <Continuous>  dextrose 5%. 1000 milliLiter(s) (100 mL/Hr) IV Continuous <Continuous>  dextrose 50% Injectable 25 Gram(s) IV Push once  dextrose 50% Injectable 25 Gram(s) IV Push once  dextrose 50% Injectable 12.5 Gram(s) IV Push once  dorzolamide 2%/timolol 0.5% Ophthalmic Solution 1 Drop(s) Both EYES two times a day  glucagon  Injectable 1 milliGRAM(s) IntraMuscular once  heparin   Injectable 5000 Unit(s) SubCutaneous every 12 hours  insulin glargine Injectable (LANTUS) 15 Unit(s) SubCutaneous at bedtime  insulin lispro (ADMELOG) corrective regimen sliding scale   SubCutaneous Before meals and at bedtime  insulin lispro Injectable (ADMELOG) 7 Unit(s) SubCutaneous three times a day before meals  lactobacillus acidophilus 1 Tablet(s) Oral every 12 hours  midodrine. 20 milliGRAM(s) Oral three times a day  mupirocin 2% Nasal 1 Application(s) Both Nostrils two times a day  norepinephrine Infusion 0.05 MICROgram(s)/kG/Min (6 mL/Hr) IV Continuous <Continuous>  piperacillin/tazobactam IVPB.. 3.375 Gram(s) IV Intermittent every 12 hours  polyethylene glycol 3350 17 Gram(s) Oral daily  senna 2 Tablet(s) Oral at bedtime    MEDICATIONS  (PRN):  acetaminophen     Tablet .. 650 milliGRAM(s) Oral every 4 hours PRN Temp greater or equal to 38C (100.4F), Mild Pain (1 - 3)  aluminum hydroxide/magnesium hydroxide/simethicone Suspension 30 milliLiter(s) Oral every 4 hours PRN Dyspepsia  HYDROmorphone  Injectable 0.5 milliGRAM(s) IV Push every 4 hours PRN Severe Pain (7 - 10)  melatonin 3 milliGRAM(s) Oral at bedtime PRN Insomnia  ondansetron Injectable 4 milliGRAM(s) IV Push every 8 hours PRN Nausea and/or Vomiting  oxyCODONE    IR 5 milliGRAM(s) Oral every 6 hours PRN Moderate Pain (4 - 6)      OBJECTIVE    T(C): 36.9 (10-14-23 @ 07:48), Max: 37.1 (10-13-23 @ 16:01)  HR: 69 (10-14-23 @ 09:00) (62 - 88)  BP: 95/45 (10-14-23 @ 09:00) (70/43 - 186/84)  RR: 15 (10-14-23 @ 09:00) (7 - 23)  SpO2: 100% (10-14-23 @ 09:00) (93% - 100%)  Wt(kg): --  I&O's Summary    13 Oct 2023 07:01  -  14 Oct 2023 07:00  --------------------------------------------------------  IN: 980.4 mL / OUT: 0 mL / NET: 980.4 mL    14 Oct 2023 07:01  -  14 Oct 2023 09:51  --------------------------------------------------------  IN: 4.8 mL / OUT: 0 mL / NET: 4.8 mL          REVIEW OF SYSTEMS:  CONSTITUTIONAL: No fever, weight loss, or fatigue  EYES: No eye pain, visual disturbances, or discharge  ENMT:   No sinus or throat pain  NECK: No pain or stiffness  RESPIRATORY: No cough, wheezing, chills or hemoptysis; No shortness of breath  CARDIOVASCULAR: No chest pain, palpitations, dizziness, or leg swelling  GASTROINTESTINAL: No abdominal pain. No nausea, vomiting; No diarrhea or constipation. No melena or hematochezia.  GENITOURINARY: No dysuria, frequency, hematuria, or incontinence  NEUROLOGICAL: No headaches, memory loss, loss of strength, numbness, or tremors      PHYSICAL EXAM:  Appearance: NAD. VS past 24 hrs -as above   HEENT:   Moist oral mucosa. Conjunctiva clear b/l.   Neck : supple  Respiratory: Lungs CTAB.  Gastrointestinal:  Soft, nontender. No rebound. No rigidity. BS present	  Cardiovascular: RRR ,S1S2 present  Neurologic: Non-focal. Moving all extremities.  Extremities: lle dressing is intact  Skin: No rashes, No ecchymoses, No cyanosis.	  wounds ,skin lesions-See skin assesment flow sheet   LABS:                        11.1   10.99 )-----------( 464      ( 14 Oct 2023 06:04 )             35.4     10-14    133<L>  |  95<L>  |  42<H>  ----------------------------<  253<H>  4.3   |  24  |  8.40<H>    Ca    8.8      14 Oct 2023 06:04  Phos  4.6     10-14  Mg     2.5     10-14    TPro  7.4  /  Alb  2.0<L>  /  TBili  0.6  /  DBili  x   /  AST  14<L>  /  ALT  14  /  AlkPhos  136<H>  10-14    CAPILLARY BLOOD GLUCOSE      POCT Blood Glucose.: 276 mg/dL (14 Oct 2023 07:32)  POCT Blood Glucose.: 205 mg/dL (13 Oct 2023 21:30)  POCT Blood Glucose.: 226 mg/dL (13 Oct 2023 17:16)  POCT Blood Glucose.: 299 mg/dL (13 Oct 2023 11:08)      Urinalysis Basic - ( 14 Oct 2023 06:04 )    Color: x / Appearance: x / SG: x / pH: x  Gluc: 253 mg/dL / Ketone: x  / Bili: x / Urobili: x   Blood: x / Protein: x / Nitrite: x   Leuk Esterase: x / RBC: x / WBC x   Sq Epi: x / Non Sq Epi: x / Bacteria: x        Culture - Surgical Swab (collected 11 Oct 2023 13:51)  Source: .Surgical Swab Surgical Swab  Preliminary Report (13 Oct 2023 12:49):    Moderate Escherichia coli    Few Streptococcus agalactiae (Group B) isolated    Group B streptococci are susceptible to ampicillin,    penicillin and cefazolin, but may be resistant to    erythromycin and clindamycin.    Culture - Blood (collected 10 Oct 2023 12:00)  Source: .Blood Blood-Peripheral  Preliminary Report (13 Oct 2023 19:01):    No growth at 72 Hours    Culture - Blood (collected 10 Oct 2023 11:15)  Source: .Blood Blood-Peripheral  Preliminary Report (13 Oct 2023 16:00):    No growth at 72 Hours    Culture - Other (collected 10 Oct 2023 10:30)  Source: Skin Skin  Final Report (13 Oct 2023 15:09):    Culture yields growth of greater than 3 colony types of    bacteria,  which may indicate contamination and normal yoana    Call client services within 7 days if further workup is clinically    indicated. Culture includes    Numerous Streptococcus agalactiae (Group B) isolated    Group B streptococci are susceptible to ampicillin,    penicillin and cefazolin, but may be resistant to    erythromycin and clindamycin.    Numerous Escherichia coli  Organism: Escherichia coli (13 Oct 2023 15:09)  Organism: Escherichia coli (13 Oct 2023 15:09)      RADIOLOGY & ADDITIONAL TESTS:   reviewed elctronically  ASSESSMENT/PLAN: 	    25 minutes aggregate time was spent on this visit, 50% visit time spent in care co-ordination with other attendings and counselling patient .I have discussed care plan with patient / HCP/family member ,who expressed understanding of problems treatment and their effect and side effects, alternatives in details. I have asked if they have any questions and concerns and appropriately addressed them to best of my ability.

## 2023-10-15 LAB
ALBUMIN SERPL ELPH-MCNC: 1.9 G/DL — LOW (ref 3.3–5)
ALP SERPL-CCNC: 134 U/L — HIGH (ref 40–120)
ALT FLD-CCNC: 13 U/L — SIGNIFICANT CHANGE UP (ref 12–78)
ANION GAP SERPL CALC-SCNC: 7 MMOL/L — SIGNIFICANT CHANGE UP (ref 5–17)
AST SERPL-CCNC: 11 U/L — LOW (ref 15–37)
BASOPHILS # BLD AUTO: 0 K/UL — SIGNIFICANT CHANGE UP (ref 0–0.2)
BASOPHILS NFR BLD AUTO: 0 % — SIGNIFICANT CHANGE UP (ref 0–2)
BILIRUB SERPL-MCNC: 0.5 MG/DL — SIGNIFICANT CHANGE UP (ref 0.2–1.2)
BUN SERPL-MCNC: 27 MG/DL — HIGH (ref 7–23)
CALCIUM SERPL-MCNC: 8.6 MG/DL — SIGNIFICANT CHANGE UP (ref 8.5–10.1)
CHLORIDE SERPL-SCNC: 102 MMOL/L — SIGNIFICANT CHANGE UP (ref 96–108)
CO2 SERPL-SCNC: 29 MMOL/L — SIGNIFICANT CHANGE UP (ref 22–31)
CREAT SERPL-MCNC: 6.5 MG/DL — HIGH (ref 0.5–1.3)
CULTURE RESULTS: SIGNIFICANT CHANGE UP
CULTURE RESULTS: SIGNIFICANT CHANGE UP
EGFR: 10 ML/MIN/1.73M2 — LOW
EOSINOPHIL # BLD AUTO: 0.71 K/UL — HIGH (ref 0–0.5)
EOSINOPHIL NFR BLD AUTO: 6 % — SIGNIFICANT CHANGE UP (ref 0–6)
GLUCOSE SERPL-MCNC: 97 MG/DL — SIGNIFICANT CHANGE UP (ref 70–99)
HCT VFR BLD CALC: 30.5 % — LOW (ref 39–50)
HGB BLD-MCNC: 9.5 G/DL — LOW (ref 13–17)
LYMPHOCYTES # BLD AUTO: 26 % — SIGNIFICANT CHANGE UP (ref 13–44)
LYMPHOCYTES # BLD AUTO: 3.06 K/UL — SIGNIFICANT CHANGE UP (ref 1–3.3)
MAGNESIUM SERPL-MCNC: 2.4 MG/DL — SIGNIFICANT CHANGE UP (ref 1.6–2.6)
MCHC RBC-ENTMCNC: 29.3 PG — SIGNIFICANT CHANGE UP (ref 27–34)
MCHC RBC-ENTMCNC: 31.1 GM/DL — LOW (ref 32–36)
MCV RBC AUTO: 94.1 FL — SIGNIFICANT CHANGE UP (ref 80–100)
MONOCYTES # BLD AUTO: 1.65 K/UL — HIGH (ref 0–0.9)
MONOCYTES NFR BLD AUTO: 14 % — SIGNIFICANT CHANGE UP (ref 2–14)
NEUTROPHILS # BLD AUTO: 6.36 K/UL — SIGNIFICANT CHANGE UP (ref 1.8–7.4)
NEUTROPHILS NFR BLD AUTO: 53 % — SIGNIFICANT CHANGE UP (ref 43–77)
NRBC # BLD: SIGNIFICANT CHANGE UP /100 WBCS (ref 0–0)
PHOSPHATE SERPL-MCNC: 3.1 MG/DL — SIGNIFICANT CHANGE UP (ref 2.5–4.5)
PLATELET # BLD AUTO: 406 K/UL — HIGH (ref 150–400)
POTASSIUM SERPL-MCNC: 3.8 MMOL/L — SIGNIFICANT CHANGE UP (ref 3.5–5.3)
POTASSIUM SERPL-SCNC: 3.8 MMOL/L — SIGNIFICANT CHANGE UP (ref 3.5–5.3)
PROT SERPL-MCNC: 7.2 G/DL — SIGNIFICANT CHANGE UP (ref 6–8.3)
RBC # BLD: 3.24 M/UL — LOW (ref 4.2–5.8)
RBC # FLD: 14.7 % — HIGH (ref 10.3–14.5)
SODIUM SERPL-SCNC: 138 MMOL/L — SIGNIFICANT CHANGE UP (ref 135–145)
SPECIMEN SOURCE: SIGNIFICANT CHANGE UP
SPECIMEN SOURCE: SIGNIFICANT CHANGE UP
WBC # BLD: 11.77 K/UL — HIGH (ref 3.8–10.5)
WBC # FLD AUTO: 11.77 K/UL — HIGH (ref 3.8–10.5)

## 2023-10-15 PROCEDURE — 99291 CRITICAL CARE FIRST HOUR: CPT | Mod: GC

## 2023-10-15 PROCEDURE — 99233 SBSQ HOSP IP/OBS HIGH 50: CPT

## 2023-10-15 RX ORDER — OXYCODONE HYDROCHLORIDE 5 MG/1
5 TABLET ORAL ONCE
Refills: 0 | Status: DISCONTINUED | OUTPATIENT
Start: 2023-10-15 | End: 2023-10-15

## 2023-10-15 RX ORDER — INSULIN GLARGINE 100 [IU]/ML
13 INJECTION, SOLUTION SUBCUTANEOUS AT BEDTIME
Refills: 0 | Status: DISCONTINUED | OUTPATIENT
Start: 2023-10-15 | End: 2023-10-19

## 2023-10-15 RX ORDER — INSULIN LISPRO 100/ML
5 VIAL (ML) SUBCUTANEOUS
Refills: 0 | Status: DISCONTINUED | OUTPATIENT
Start: 2023-10-15 | End: 2023-10-19

## 2023-10-15 RX ADMIN — Medication 3 MILLIGRAM(S): at 00:43

## 2023-10-15 RX ADMIN — MIDODRINE HYDROCHLORIDE 20 MILLIGRAM(S): 2.5 TABLET ORAL at 05:35

## 2023-10-15 RX ADMIN — Medication 7 UNIT(S): at 12:09

## 2023-10-15 RX ADMIN — ATORVASTATIN CALCIUM 40 MILLIGRAM(S): 80 TABLET, FILM COATED ORAL at 21:42

## 2023-10-15 RX ADMIN — HEPARIN SODIUM 5000 UNIT(S): 5000 INJECTION INTRAVENOUS; SUBCUTANEOUS at 05:35

## 2023-10-15 RX ADMIN — OXYCODONE HYDROCHLORIDE 5 MILLIGRAM(S): 5 TABLET ORAL at 21:42

## 2023-10-15 RX ADMIN — MIDODRINE HYDROCHLORIDE 20 MILLIGRAM(S): 2.5 TABLET ORAL at 12:10

## 2023-10-15 RX ADMIN — HEPARIN SODIUM 5000 UNIT(S): 5000 INJECTION INTRAVENOUS; SUBCUTANEOUS at 17:02

## 2023-10-15 RX ADMIN — OXYCODONE HYDROCHLORIDE 5 MILLIGRAM(S): 5 TABLET ORAL at 18:41

## 2023-10-15 RX ADMIN — MUPIROCIN 1 APPLICATION(S): 20 OINTMENT TOPICAL at 17:02

## 2023-10-15 RX ADMIN — Medication 5 UNIT(S): at 17:01

## 2023-10-15 RX ADMIN — MUPIROCIN 1 APPLICATION(S): 20 OINTMENT TOPICAL at 05:34

## 2023-10-15 RX ADMIN — OXYCODONE HYDROCHLORIDE 5 MILLIGRAM(S): 5 TABLET ORAL at 22:12

## 2023-10-15 RX ADMIN — PIPERACILLIN AND TAZOBACTAM 25 GRAM(S): 4; .5 INJECTION, POWDER, LYOPHILIZED, FOR SOLUTION INTRAVENOUS at 00:43

## 2023-10-15 RX ADMIN — Medication 1 TABLET(S): at 17:02

## 2023-10-15 RX ADMIN — Medication 1 TABLET(S): at 05:35

## 2023-10-15 RX ADMIN — INSULIN GLARGINE 13 UNIT(S): 100 INJECTION, SOLUTION SUBCUTANEOUS at 21:49

## 2023-10-15 RX ADMIN — MIDODRINE HYDROCHLORIDE 20 MILLIGRAM(S): 2.5 TABLET ORAL at 18:09

## 2023-10-15 RX ADMIN — DORZOLAMIDE HYDROCHLORIDE TIMOLOL MALEATE 1 DROP(S): 20; 5 SOLUTION/ DROPS OPHTHALMIC at 05:34

## 2023-10-15 RX ADMIN — Medication 1: at 07:24

## 2023-10-15 RX ADMIN — Medication 7 UNIT(S): at 07:25

## 2023-10-15 RX ADMIN — DORZOLAMIDE HYDROCHLORIDE TIMOLOL MALEATE 1 DROP(S): 20; 5 SOLUTION/ DROPS OPHTHALMIC at 17:02

## 2023-10-15 RX ADMIN — Medication 3: at 21:50

## 2023-10-15 RX ADMIN — OXYCODONE HYDROCHLORIDE 5 MILLIGRAM(S): 5 TABLET ORAL at 18:11

## 2023-10-15 RX ADMIN — Medication 1: at 17:01

## 2023-10-15 RX ADMIN — PIPERACILLIN AND TAZOBACTAM 25 GRAM(S): 4; .5 INJECTION, POWDER, LYOPHILIZED, FOR SOLUTION INTRAVENOUS at 12:10

## 2023-10-15 NOTE — PROGRESS NOTE ADULT - ASSESSMENT
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 Sin/Dr. Singh) Presents to ED [facility] 10/10 with weakness for 2 weeks. 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 Wound care consult and ID consults are requested . (10 Oct 2023 12:52)      esrd on hd for mwf   Excess fluids and waste products will be removed from your blood; your electrolytes will be balanced; your blood pressure will be controlled.    Admit for septic workup and ID evaluation,send blood and urine cx,serial lactate levels,monitor vitals closley,ivfs hydration,monitor urine output and renal profile,iv abx as per id cons  vancomycin  IVPB 500 milliGRAM(s) IV Intermittent once    BP monitoring,continue current  meds, low salt diet,followup with PMD in 1-2 weeks  midodrine. 10 milliGRAM(s) Oral three times a day  norepinephrine Infusion 0.05 MICROgram(s)/kG/Min (6 mL/Hr) IV Continuous       ANEMIA PLAN:  Anemia of chronic disease:  We will continue epo aiming for a HCT of 32-36 %.   We will monitor Iron stores, B12 and RBC folate .    dvt heparin   Injectable 5000 Unit(s) SubCutaneous every 12 hours  Taken to OR for urgent left knee disarticulation for gas gangrne  POD#4

## 2023-10-15 NOTE — PROGRESS NOTE ADULT - ASSESSMENT
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 Sin/Dr. Singh) Presents to ED [facility] 10/10 with weakness for 2 weeks    anemia  esrd  weakness  DM  BKA  PAD  PVD  Pain    POD 4  vs noted  on ABX  on Opioids for pain  labs reviewed  ICU care in progress    full code  lives with family at home - in [facility] as per renal  pain relief  oral hygiene  skin care  wound care  assist with needs

## 2023-10-15 NOTE — PROGRESS NOTE ADULT - SUBJECTIVE AND OBJECTIVE BOX
Date/Time Patient Seen:  		  Referring MD:   Data Reviewed	       Patient is a 48y old  Male who presents with a chief complaint of weakness (14 Oct 2023 16:20)      Subjective/HPI     PAST MEDICAL & SURGICAL HISTORY:  ESRD on dialysis    H/O hypotension    Diabetes mellitus    Leg wound, left    Steal syndrome dialysis vascular access    Gangrene of finger of right hand    PVD (peripheral vascular disease)    Anemia of chronic disease    Constipation    HLD (hyperlipidemia)    S/P BKA (below knee amputation) bilateral    AV fistula          Medication list         MEDICATIONS  (STANDING):  atorvastatin 40 milliGRAM(s) Oral at bedtime  dextrose 5%. 1000 milliLiter(s) (50 mL/Hr) IV Continuous <Continuous>  dextrose 5%. 1000 milliLiter(s) (100 mL/Hr) IV Continuous <Continuous>  dextrose 50% Injectable 25 Gram(s) IV Push once  dextrose 50% Injectable 12.5 Gram(s) IV Push once  dextrose 50% Injectable 25 Gram(s) IV Push once  dorzolamide 2%/timolol 0.5% Ophthalmic Solution 1 Drop(s) Both EYES two times a day  glucagon  Injectable 1 milliGRAM(s) IntraMuscular once  heparin   Injectable 5000 Unit(s) SubCutaneous every 12 hours  insulin glargine Injectable (LANTUS) 15 Unit(s) SubCutaneous at bedtime  insulin lispro (ADMELOG) corrective regimen sliding scale   SubCutaneous Before meals and at bedtime  insulin lispro Injectable (ADMELOG) 7 Unit(s) SubCutaneous three times a day before meals  lactobacillus acidophilus 1 Tablet(s) Oral every 12 hours  midodrine. 20 milliGRAM(s) Oral three times a day  mupirocin 2% Nasal 1 Application(s) Both Nostrils two times a day  norepinephrine Infusion 0.05 MICROgram(s)/kG/Min (6 mL/Hr) IV Continuous <Continuous>  piperacillin/tazobactam IVPB.. 3.375 Gram(s) IV Intermittent every 12 hours  polyethylene glycol 3350 17 Gram(s) Oral daily  senna 2 Tablet(s) Oral at bedtime    MEDICATIONS  (PRN):  acetaminophen     Tablet .. 650 milliGRAM(s) Oral every 4 hours PRN Temp greater or equal to 38C (100.4F), Mild Pain (1 - 3)  aluminum hydroxide/magnesium hydroxide/simethicone Suspension 30 milliLiter(s) Oral every 4 hours PRN Dyspepsia  HYDROmorphone  Injectable 0.5 milliGRAM(s) IV Push every 4 hours PRN Severe Pain (7 - 10)  melatonin 3 milliGRAM(s) Oral at bedtime PRN Insomnia  ondansetron Injectable 4 milliGRAM(s) IV Push every 8 hours PRN Nausea and/or Vomiting  oxyCODONE    IR 5 milliGRAM(s) Oral every 6 hours PRN Moderate Pain (4 - 6)         Vitals log        ICU Vital Signs Last 24 Hrs  T(C): 36.6 (15 Oct 2023 00:09), Max: 36.9 (14 Oct 2023 07:48)  T(F): 97.8 (15 Oct 2023 00:09), Max: 98.5 (14 Oct 2023 07:48)  HR: 62 (15 Oct 2023 04:00) (61 - 93)  BP: 117/59 (15 Oct 2023 04:00) (76/45 - 227/106)  BP(mean): 82 (15 Oct 2023 04:00) (54 - 152)  ABP: --  ABP(mean): --  RR: 5 (15 Oct 2023 04:00) (5 - 29)  SpO2: 100% (15 Oct 2023 04:00) (93% - 100%)    O2 Parameters below as of 14 Oct 2023 19:15  Patient On (Oxygen Delivery Method): room air                 Input and Output:  I&O's Detail    13 Oct 2023 07:01  -  14 Oct 2023 07:00  --------------------------------------------------------  IN:    IV PiggyBack: 200 mL    Norepinephrine: 80.4 mL    Oral Fluid: 700 mL  Total IN: 980.4 mL    OUT:    Voided (mL): 0 mL  Total OUT: 0 mL    Total NET: 980.4 mL      14 Oct 2023 07:01  -  15 Oct 2023 05:31  --------------------------------------------------------  IN:    IV PiggyBack: 200 mL    Norepinephrine: 73.2 mL    Oral Fluid: 760 mL  Total IN: 1033.2 mL    OUT:    Other (mL): 0 mL    Voided (mL): 0 mL  Total OUT: 0 mL    Total NET: 1033.2 mL          Lab Data                        11.1   10.99 )-----------( 464      ( 14 Oct 2023 06:04 )             35.4     10-14    133<L>  |  95<L>  |  42<H>  ----------------------------<  253<H>  4.3   |  24  |  8.40<H>    Ca    8.8      14 Oct 2023 06:04  Phos  4.6     10-14  Mg     2.5     10-14    TPro  7.4  /  Alb  2.0<L>  /  TBili  0.6  /  DBili  x   /  AST  14<L>  /  ALT  14  /  AlkPhos  136<H>  10-14            Review of Systems	      Objective     Physical Examination    heart s1s2  lung dc BS      Pertinent Lab findings & Imaging      Barak:  NO   Adequate UO     I&O's Detail    13 Oct 2023 07:01  -  14 Oct 2023 07:00  --------------------------------------------------------  IN:    IV PiggyBack: 200 mL    Norepinephrine: 80.4 mL    Oral Fluid: 700 mL  Total IN: 980.4 mL    OUT:    Voided (mL): 0 mL  Total OUT: 0 mL    Total NET: 980.4 mL      14 Oct 2023 07:01  -  15 Oct 2023 05:31  --------------------------------------------------------  IN:    IV PiggyBack: 200 mL    Norepinephrine: 73.2 mL    Oral Fluid: 760 mL  Total IN: 1033.2 mL    OUT:    Other (mL): 0 mL    Voided (mL): 0 mL  Total OUT: 0 mL    Total NET: 1033.2 mL               Discussed with:     Cultures:	        Radiology

## 2023-10-15 NOTE — PROGRESS NOTE ADULT - SUBJECTIVE AND OBJECTIVE BOX
CHIEF COMPLAINT/ REASON FOR VISIT  .. Patient was seen to address the  issue listed under PROBLEM LIST which is located toward bottom of this note     MELVI RODRIGUEZ    [facility] ICU1 07A    Allergies    No Known Drug Allergies  [place] (Rash)    Intolerances        PAST MEDICAL & SURGICAL HISTORY:  ESRD on dialysis      H/O hypotension      Diabetes mellitus      Leg wound, left      Steal syndrome dialysis vascular access      Gangrene of finger of right hand      PVD (peripheral vascular disease)      Anemia of chronic disease      Constipation      HLD (hyperlipidemia)      S/P BKA (below knee amputation) bilateral      AV fistula          FAMILY HISTORY:      Home Medications:  Admelog 100 units/mL injectable solution: 4 unit(s) subcutaneously 3 times a day (10 Oct 2023 14:32)  amLODIPine 5 mg oral tablet: 1 tab(s) orally once a day (10 Oct 2023 14:32)  atorvastatin 40 mg oral tablet: 1 tab(s) orally once a day (10 Oct 2023 14:32)  insulin glargine 100 units/mL subcutaneous solution: 4 unit(s) subcutaneous once a day (at bedtime) (10 Oct 2023 14:32)  senna (sennosides) 8.6 mg oral tablet: 2 tab(s) orally once a day (at bedtime) (10 Oct 2023 14:32)  Velphoro 500 mg oral tablet, chewable: 3 tab(s) chewed 3 times a day (10 Oct 2023 14:32)      MEDICATIONS  (STANDING):  atorvastatin 40 milliGRAM(s) Oral at bedtime  dextrose 5%. 1000 milliLiter(s) (50 mL/Hr) IV Continuous <Continuous>  dextrose 5%. 1000 milliLiter(s) (100 mL/Hr) IV Continuous <Continuous>  dextrose 50% Injectable 25 Gram(s) IV Push once  dextrose 50% Injectable 12.5 Gram(s) IV Push once  dextrose 50% Injectable 25 Gram(s) IV Push once  dorzolamide 2%/timolol 0.5% Ophthalmic Solution 1 Drop(s) Both EYES two times a day  glucagon  Injectable 1 milliGRAM(s) IntraMuscular once  heparin   Injectable 5000 Unit(s) SubCutaneous every 12 hours  insulin glargine Injectable (LANTUS) 15 Unit(s) SubCutaneous at bedtime  insulin lispro (ADMELOG) corrective regimen sliding scale   SubCutaneous Before meals and at bedtime  insulin lispro Injectable (ADMELOG) 7 Unit(s) SubCutaneous three times a day before meals  lactobacillus acidophilus 1 Tablet(s) Oral every 12 hours  midodrine. 20 milliGRAM(s) Oral three times a day  mupirocin 2% Nasal 1 Application(s) Both Nostrils two times a day  piperacillin/tazobactam IVPB.. 3.375 Gram(s) IV Intermittent every 12 hours    MEDICATIONS  (PRN):  acetaminophen     Tablet .. 650 milliGRAM(s) Oral every 4 hours PRN Temp greater or equal to 38C (100.4F), Mild Pain (1 - 3)  aluminum hydroxide/magnesium hydroxide/simethicone Suspension 30 milliLiter(s) Oral every 4 hours PRN Dyspepsia  HYDROmorphone  Injectable 0.5 milliGRAM(s) IV Push every 4 hours PRN Severe Pain (7 - 10)  melatonin 3 milliGRAM(s) Oral at bedtime PRN Insomnia  ondansetron Injectable 4 milliGRAM(s) IV Push every 8 hours PRN Nausea and/or Vomiting  oxyCODONE    IR 5 milliGRAM(s) Oral every 6 hours PRN Moderate Pain (4 - 6)      Diet, DASH/TLC:   Sodium & Cholesterol Restricted  Consistent Carbohydrate Evening Snack  For patients receiving Renal Replacement - No Protein Restr, No Conc K, No Conc Phos, Low Sodium (RENAL) (10-14-23 @ 08:42) [Active]          Vital Signs Last 24 Hrs  T(C): 36.9 (15 Oct 2023 07:54), Max: 36.9 (14 Oct 2023 19:15)  T(F): 98.5 (15 Oct 2023 07:54), Max: 98.5 (14 Oct 2023 19:15)  HR: 63 (15 Oct 2023 08:00) (61 - 93)  BP: 127/65 (15 Oct 2023 08:00) (76/45 - 227/106)  BP(mean): 88 (15 Oct 2023 08:00) (54 - 152)  RR: 27 (15 Oct 2023 08:00) (0 - 29)  SpO2: 100% (15 Oct 2023 08:00) (93% - 100%)    Parameters below as of 15 Oct 2023 06:00  Patient On (Oxygen Delivery Method): room air          10-14-23 @ 07:01  -  10-15-23 @ 07:00  --------------------------------------------------------  IN: 1033.2 mL / OUT: 0 mL / NET: 1033.2 mL              LABS:                        11.1   10.99 )-----------( 464      ( 14 Oct 2023 06:04 )             35.4     10-14    133<L>  |  95<L>  |  42<H>  ----------------------------<  253<H>  4.3   |  24  |  8.40<H>    Ca    8.8      14 Oct 2023 06:04  Phos  4.6     10-14  Mg     2.5     10-14    TPro  7.4  /  Alb  2.0<L>  /  TBili  0.6  /  DBili  x   /  AST  14<L>  /  ALT  14  /  AlkPhos  136<H>  10-14      Urinalysis Basic - ( 14 Oct 2023 06:04 )    Color: x / Appearance: x / SG: x / pH: x  Gluc: 253 mg/dL / Ketone: x  / Bili: x / Urobili: x   Blood: x / Protein: x / Nitrite: x   Leuk Esterase: x / RBC: x / WBC x   Sq Epi: x / Non Sq Epi: x / Bacteria: x            WBC:  WBC Count: 10.99 K/uL (10-14 @ 06:04)  WBC Count: 13.81 K/uL (10-13 @ 06:10)  WBC Count: 22.10 K/uL (10-12 @ 06:33)  WBC Count: 21.99 K/uL (10-11 @ 18:40)      MICROBIOLOGY:  RECENT CULTURES:  10-11 .Surgical Swab Surgical Swab Escherichia coli XXXX   Moderate Escherichia coli  Few Streptococcus agalactiae (Group B) Streptococcus agalactiae (Group B)  isolated  Group B streptococci are susceptible to ampicillin,  penicillin and cefazolin, but may be resistant to  erythromycin and clindamycin.  Rare Streptococcus mitis/oralis group "Susceptibilities not performed"  Numerous Actinomyces odontolyticus "Susceptibilities not performed"  Few Streptococcus agalactiae (Group B) isolated  Group B streptococci are susceptible to ampicillin,  penicillin and cefazolin, but may be resistant to  erythromycin and clindamycin.    10-10 .Blood Blood-Peripheral XXXX XXXX   No growth at 4 days    10-10 .Blood Blood-Peripheral XXXX XXXX   No growth at 4 days    10-10 Skin Skin Escherichia coli XXXX   Culture yields growth of greater than 3 colony types of  bacteria,  which may indicate contamination and normal yoana  Call client services within 7 days if further workup is clinically  indicated. Culture includes  Numerous Streptococcus agalactiae (Group B) isolated  Group B streptococci are susceptible to ampicillin,  penicillin and cefazolin, but may be resistant to  erythromycin and clindamycin.  Numerous Escherichia coli                    Sodium:  Sodium: 133 mmol/L (10-14 @ 06:04)  Sodium: 133 mmol/L (10-13 @ 06:10)  Sodium: 134 mmol/L (10-12 @ 06:33)  Sodium: 136 mmol/L (10-11 @ 18:40)  Sodium: 135 mmol/L (10-11 @ 10:17)      8.40 mg/dL 10-14 @ 06:04  6.90 mg/dL 10-13 @ 06:10  10.00 mg/dL 10-12 @ 06:33  9.40 mg/dL 10-11 @ 18:40  9.10 mg/dL 10-11 @ 10:17      Hemoglobin:  Hemoglobin: 11.1 g/dL (10-14 @ 06:04)  Hemoglobin: 10.3 g/dL (10-13 @ 06:10)  Hemoglobin: 10.0 g/dL (10-12 @ 06:33)  Hemoglobin: 11.8 g/dL (10-11 @ 18:40)      Platelets: Platelet Count - Automated: 464 K/uL (10-14 @ 06:04)  Platelet Count - Automated: 458 K/uL (10-13 @ 06:10)  Platelet Count - Automated: 427 K/uL (10-12 @ 06:33)  Platelet Count - Automated: 456 K/uL (10-11 @ 18:40)      LIVER FUNCTIONS - ( 14 Oct 2023 06:04 )  Alb: 2.0 g/dL / Pro: 7.4 g/dL / ALK PHOS: 136 U/L / ALT: 14 U/L / AST: 14 U/L / GGT: x             Urinalysis Basic - ( 14 Oct 2023 06:04 )    Color: x / Appearance: x / SG: x / pH: x  Gluc: 253 mg/dL / Ketone: x  / Bili: x / Urobili: x   Blood: x / Protein: x / Nitrite: x   Leuk Esterase: x / RBC: x / WBC x   Sq Epi: x / Non Sq Epi: x / Bacteria: x        RADIOLOGY & ADDITIONAL STUDIES:      MICROBIOLOGY:  RECENT CULTURES:  10-11 .Surgical Swab Surgical Swab Escherichia coli XXXX   Moderate Escherichia coli  Few Streptococcus agalactiae (Group B) Streptococcus agalactiae (Group B)  isolated  Group B streptococci are susceptible to ampicillin,  penicillin and cefazolin, but may be resistant to  erythromycin and clindamycin.  Rare Streptococcus mitis/oralis group "Susceptibilities not performed"  Numerous Actinomyces odontolyticus "Susceptibilities not performed"  Few Streptococcus agalactiae (Group B) isolated  Group B streptococci are susceptible to ampicillin,  penicillin and cefazolin, but may be resistant to  erythromycin and clindamycin.    10-10 .Blood Blood-Peripheral XXXX XXXX   No growth at 4 days    10-10 .Blood Blood-Peripheral XXXX XXXX   No growth at 4 days    10-10 Skin Skin Escherichia coli XXXX   Culture yields growth of greater than 3 colony types of  bacteria,  which may indicate contamination and normal yoana  Call client services within 7 days if further workup is clinically  indicated. Culture includes  Numerous Streptococcus agalactiae (Group B) isolated  Group B streptococci are susceptible to ampicillin,  penicillin and cefazolin, but may be resistant to  erythromycin and clindamycin.  Numerous Escherichia coli

## 2023-10-15 NOTE — PROGRESS NOTE ADULT - SUBJECTIVE AND OBJECTIVE BOX
INTERVAL HPI/OVERNIGHT EVENTS:  - HD, no complications  - Increased midodrine to 20 TID, s/p IVF for HD  - Weaned off levo  - OR Cx: +GBS +EColi, Wound Cx: +GBS  - Started on renal + CC diet w/ snack  - Increased qhs and premeal insulin  - Soft BM x4, no diarrhea      SUBJECTIVE: Patient seen and examined at bedside. Feeling overall well. Tolerated HD well. Reports improved abdominal pain.    ROS:  Constitutional: no fever, chills, fatigue  Neuro: no headache, numbness, weakness, dizziness  Resp: no cough, wheezing, shortness of breath  CVS: no chest pain, palpitations, leg swelling  GI: no abdominal pain, nausea, vomiting, diarrhea   : no dysuria, frequency, incontinence  Skin: no itching, burning, rashes, or lesions   Msk: +LLE pain    OBJECTIVE:    VITAL SIGNS:  ICU Vital Signs Last 24 Hrs  T(C): 36.9 (15 Oct 2023 07:54), Max: 36.9 (14 Oct 2023 19:15)  T(F): 98.5 (15 Oct 2023 07:54), Max: 98.5 (14 Oct 2023 19:15)  HR: 63 (15 Oct 2023 08:00) (61 - 93)  BP: 127/65 (15 Oct 2023 08:00) (76/45 - 227/106)  BP(mean): 88 (15 Oct 2023 08:00) (54 - 152)  ABP: --  ABP(mean): --  RR: 27 (15 Oct 2023 08:00) (0 - 29)  SpO2: 100% (15 Oct 2023 08:00) (93% - 100%)    O2 Parameters below as of 15 Oct 2023 06:00  Patient On (Oxygen Delivery Method): room air              10-14 @ 07:01  -  10-15 @ 07:00  --------------------------------------------------------  IN: 1033.2 mL / OUT: 0 mL / NET: 1033.2 mL      CAPILLARY BLOOD GLUCOSE      POCT Blood Glucose.: 199 mg/dL (15 Oct 2023 07:17)      PHYSICAL EXAM:  General: NAD, comfortable  HEENT: NCAT, clear conjunctiva, no scleral icterus  Respiratory: CTA b/l, no wheezing, rhonchi, rales  Cardiovascular: RRR, normal S1S2, no M/R/G  Abdomen: soft, NT/ND, bowel sounds present  Extremities: LLE dressings C/D/I, +mild TTP, well-healed s/p R BKA and R hand 3rd digit amputation changes  Neurology: awake and alert    MEDICATIONS:  MEDICATIONS  (STANDING):  atorvastatin 40 milliGRAM(s) Oral at bedtime  dextrose 5%. 1000 milliLiter(s) (50 mL/Hr) IV Continuous <Continuous>  dextrose 5%. 1000 milliLiter(s) (100 mL/Hr) IV Continuous <Continuous>  dextrose 50% Injectable 25 Gram(s) IV Push once  dextrose 50% Injectable 25 Gram(s) IV Push once  dextrose 50% Injectable 12.5 Gram(s) IV Push once  dorzolamide 2%/timolol 0.5% Ophthalmic Solution 1 Drop(s) Both EYES two times a day  glucagon  Injectable 1 milliGRAM(s) IntraMuscular once  heparin   Injectable 5000 Unit(s) SubCutaneous every 12 hours  insulin glargine Injectable (LANTUS) 15 Unit(s) SubCutaneous at bedtime  insulin lispro (ADMELOG) corrective regimen sliding scale   SubCutaneous Before meals and at bedtime  insulin lispro Injectable (ADMELOG) 7 Unit(s) SubCutaneous three times a day before meals  lactobacillus acidophilus 1 Tablet(s) Oral every 12 hours  midodrine. 20 milliGRAM(s) Oral three times a day  mupirocin 2% Nasal 1 Application(s) Both Nostrils two times a day  piperacillin/tazobactam IVPB.. 3.375 Gram(s) IV Intermittent every 12 hours    MEDICATIONS  (PRN):  acetaminophen     Tablet .. 650 milliGRAM(s) Oral every 4 hours PRN Temp greater or equal to 38C (100.4F), Mild Pain (1 - 3)  aluminum hydroxide/magnesium hydroxide/simethicone Suspension 30 milliLiter(s) Oral every 4 hours PRN Dyspepsia  HYDROmorphone  Injectable 0.5 milliGRAM(s) IV Push every 4 hours PRN Severe Pain (7 - 10)  melatonin 3 milliGRAM(s) Oral at bedtime PRN Insomnia  ondansetron Injectable 4 milliGRAM(s) IV Push every 8 hours PRN Nausea and/or Vomiting  oxyCODONE    IR 5 milliGRAM(s) Oral every 6 hours PRN Moderate Pain (4 - 6)      ALLERGIES:  Allergies    No Known Drug Allergies  [place] (Rash)    Intolerances        LABS:                        11.1   10.99 )-----------( 464      ( 14 Oct 2023 06:04 )             35.4     10-14    133<L>  |  95<L>  |  42<H>  ----------------------------<  253<H>  4.3   |  24  |  8.40<H>    Ca    8.8      14 Oct 2023 06:04  Phos  4.6     10-14  Mg     2.5     10-14    TPro  7.4  /  Alb  2.0<L>  /  TBili  0.6  /  DBili  x   /  AST  14<L>  /  ALT  14  /  AlkPhos  136<H>  10-14      Urinalysis Basic - ( 14 Oct 2023 06:04 )    Color: x / Appearance: x / SG: x / pH: x  Gluc: 253 mg/dL / Ketone: x  / Bili: x / Urobili: x   Blood: x / Protein: x / Nitrite: x   Leuk Esterase: x / RBC: x / WBC x   Sq Epi: x / Non Sq Epi: x / Bacteria: x        RADIOLOGY & ADDITIONAL TESTS: None in 24 hours.    Lines/Tubes: R brachial (HD), PIVs (R EJ x1)      GLOBAL ISSUE/BEST PRACTICE  Analgesia: Y  Sedation: Y  HOB elevation: yes  Stress ulcer prophylaxis: Y  VTE prophylaxis: Y  Glycemic control: Y  Nutrition: Y    CODE STATUS: Full Code

## 2023-10-15 NOTE — PROVIDER CONTACT NOTE (EICU) - ASSESSMENT
bedside RN requesting 5 mg OXycodone IR as aptient hacving pian with dressing change, patient does not want IVP dilaudid

## 2023-10-15 NOTE — PROGRESS NOTE ADULT - ASSESSMENT
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 Sin/Dr. Singh) Presents to ED [facility] 10/10 with weakness for 2 weeks. 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 Wound care consult and ID consults are requested .

## 2023-10-15 NOTE — PROGRESS NOTE ADULT - ASSESSMENT
48 yr old male with PMHx of DM2, ESRD on H.D. 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 1/2023 requiring multiple irrigation and debridements (last 3/2023) wound vac therapy with eventual Rt 3rd digit of Rt hand amputation (approx 2/2023) p/w worsening LLE wound/purulent drainage i/s/o hypoTN, leukocytosis, fever c/f sepsis now s/p Zosyn course and emergent L AKA 10/11 c/b refractory hypoTN requiring pressors    Neuro:  -Off sedation, melatonin for sleep  -Pain: Tylenol 650 q6, oxy prn    CV:  -A/w refractory hypoTN c/f septic shock, now improving  -MAPs >65, now off pressors  -Continue midodrine 20 TID  -Lactate neg    Pulm:  -SpO2 >92% RA  -IS postop    GI:  -Overnight soft BM x4, no diarrhea; will D/C bowel reg, can consider GI PCR if ongoing  -Diet: renal restricted + CC diet w/ snack  -PPI and Milox  -Zofran PRN for n/v    Renal:  -ESRD w/ HD MWF through R brachial AVF; plan for HD M 10/16  -Serum lytes currently stable  -Nephrology following    Heme:  -H/h stable  -DVT ppx: SQH    ID:  -A/w septic shock and c/f LLE necrotizing fasciitis now s/p AKA, doing well clinically at the moment but will CTM closely  -Per VSx plan for OR "next week" for stump closure  -MRSA swab 10/11 positive  -WBC on admit 22k now down to 11k, currently afebrile  -IVABX per ID: Zosyn 10/12-10/18; s/p Vanco x3  -BCx 10/10 NGTD; OR Cx: +GBS +EColi, Wound Cx: +GBS, pending sensitivities    Endo:  -Hx DM2, BGs well controlled  -Continue SSI, Lantus 15u qhs, 7u premeals    Lines/Tubes: R brachial (HD), PIVs (R EJ x1)    Dispo: ICU

## 2023-10-15 NOTE — PROGRESS NOTE ADULT - ASSESSMENT
REASON FOR VISIT  .. Management of problems listed below        REVIEW OF SYMPTOMS   Able to give ROS  Yes     RELIABILITY +/-   CONSTITUTIONAL Weakness Yes    ENDOCRINE  No heat or cold intolerance    ALLERGY No hives  Sore throat No stridor  RESP Shortness of breath YES   NEURO New weakness No   CARDIAC   Palpitations No         PHYSICAL EXAM    HEENT Unremarkable  atraumatic   RESP Fair air entry  Harsh breath sound   CARDIAC S1 S2 No S3     NO JVD    ABDOMEN No hepatosplenomegaly   bl bka  NO rash       GENERAL DATA .     GOC.  .. 10/10/2023 full code  ICU STAY. .. 10/11/2023   COVID. ..  scv2 10/10/2023 (-)  BEST PRACTICE ISSUES.    HOB ELEVATN. .. Yes  DVT PPLX. ..  10/10/2023 [facility]    SPEECH SWALLOW RECOMMENDATIONS.    ..        DIET.   ..  10/11 renal restrn   ..  10/10/2023 npo   IV fl ...   BOLUS.   .. 10/11/2023 4a ns 500   .. 10/10/2023 4p ns 250   .. 10/10/2023 12p ns 500  .. 10/10/2023 9a ns 1000   STRESS ULCER PPLX. ..    10/10/2023 protonix 40  INFECTION PPLX. ..   10/13 mupirocin 2%   ALLGY. ..   turkey                      WT. ..  10/10/2023 59  BMI. ..      PROCEDURES.  .. 10/11/2023 l knee disarticulation   PRESENT ON ADMISSION.    ABGS.   .     VS/ PO/IO/ VENT/ DRIPS.  10/15/2023 60 110/50     DOA C/C.   10/10/2023 Increasing weakness 2 weeks fevermalaise     INITIAL PRESENTATION.   . 10/10/2023 47yo male with weakness for 2 weeks. pt is dialysis patient M/W/F last dialysis was yesterday but pt has been feeling weak for the last 2 weeks, no cough, fever, chills, no vomiting or diarrhea, pt also has had an open wound under his left thigh that has been neglected for the last few weeks, draining pus and with redness  . Pulm critical care consulted as BP low     PMH.   . DM  . ESRD  . HD   . AV fistula  . BL BKA     HOME MEDS.   COURSE.     PROBLEMS/ASSESSMENT/RECOMMENDATIONS (A/R).    . Vanco level   .. 10/11-10/12-10/13-10/14/2023   .. vanco 15.6- 19.8 - 26 - 24     INFECTION.  . Skin soft tissue infection   . Necrotizing fascitis 10/10 CT   .. W 10/10-10/11-10/12-10/13-10/14/2023   .. w 19 - 17.7- 22- 13.8 - 10.9   .. ct lle 10/10   .... sp remote bka   .... no cortical eroison or aggressive periosteal reaction   .... deep st ulceration along post medial margin knee with surrounding st intramuscular and perifascial st emohysema tracking cranially dd necrotizing fascitis    .. Flu ab 10/10/2023 (-)  .. rsv 10/10/2023 (-)  .. MRSA 10/11 (+)   .. surg cs 10/11  .... 1) Mod e coli sens to piptaz   .... 2) few strep agalct  gp b   .. bc 10/10 (-)   .. skin cs 10/10 strept agalacticae   .. 10/10 9a vanco 1g givn   .. 10/12 vanco 750 givn   .. 10/10/2023 zosyn     CARDIAC.  .. La 10/10/2023 la 1.1   .. Monitor     .. Shock  .. 10/10/2023 5:30 PM Has been given 2l fluid challenge  .. 10/10/2023 5:30 PM ra po 99%  .. 10/10/2023 will cautiously try more fluids  .. 10/12 10a nore 8 in 250   .. 10/11 midodrine 10.3   .. 10/10/2023 If continues to have map below 65 will need icu for iv vasopressors   .. 10/13/2023 On iv nore started 10/12  Target MAP 65 (+)     HEMAT.  .. Hb 10/10-10/11-10/12-10/13-10/11/2023   .. Hb 10.3 - 9.2 - 10-10.3 - 11.1   .. Inr 10/10/2023 inr 129   .. Monitor     RENAL.  . ESRD  .. Na 10/10/2023 Na 136  .. Cr 10/10/2023 Cr 8.2   .. HD as guided by renal     . NECROTISZING FASCITIS   .. ct lle 10/10   .... sp remote bka   .... no cortical eroison or aggressive periosteal reaction   .... deep st ulceration along post medial margin knee with surrounding st intramuscular and perifascial st emohysema tracking cranially dd necrotizing fascitis    .. 10/11/2023 10:30 AM Surgery called as soon as radiologist reported NF   .. Pt is cleared for urgent surgery from Pul standpooint   .. 10/11 l knee disarticulation done     ADMISSION SUMMARY.   . 48 m PMH esrd admitted 10/10 with shock found to have necroitizing fascitis     MAIN ISSUES   . SHOCK   . SEPSIS   . SKIN SOFT TISSUE CELLULITIS  10/10 l post knee pressure ulcer   . NECROITIZING FASCITIS 10/10 L knee ct   . l KNEE DISARTICULATION 10/11   .. 10/11 surg strep agalacticae gp b e coli sens piptaz   . ESRD     OVERALL PLAN  . SSTI On vanco started 10/10 On Vanco started 10/10   . NECROTIZING FASCITIS 10/11/2023   . SHOCK 10/12-10/14/2023 Nore  Target MAP 65 (+)  . SP Urgent DISARTICULATION l knee 10/11   .. 10/14/2023 RTOR 1 week for AKA closure     TIME SPENT.   . Over 36 minutes aggregate care time spent on encounter; activities included   direct patient care, counseling and/or coordinating care reviewing notes, lab data/ imaging , discussion with multidisciplinary team/ patient  /family and explaining in detail risks, benefits, alternatives  of the recommendations     MICHAEL TIRADO 48 m 10/10/2023 1974 DR ELENA SCHMUTER DR MOHSEN PAHLAVAN

## 2023-10-15 NOTE — PROGRESS NOTE ADULT - SUBJECTIVE AND OBJECTIVE BOX
SUBJECTIVE:  Patient seen and examined at bedside, resting comfortably.  No overnight events.  Patient reports no new complaints at this time.    VITALS  Vital Signs Last 24 Hrs  T(C): 36.6 (15 Oct 2023 21:02), Max: 36.9 (15 Oct 2023 07:54)  T(F): 97.9 (15 Oct 2023 21:02), Max: 98.5 (15 Oct 2023 07:54)  HR: 62 (15 Oct 2023 19:00) (57 - 84)  BP: 132/61 (15 Oct 2023 19:00) (94/51 - 227/106)  BP(mean): 87 (15 Oct 2023 19:00) (66 - 152)  RR: 16 (15 Oct 2023 19:00) (0 - 27)  SpO2: 100% (15 Oct 2023 19:00) (97% - 100%)    Parameters below as of 15 Oct 2023 06:00  Patient On (Oxygen Delivery Method): room air      PHYSICAL EXAM  GENERAL:  NAD.  HEENT:  NC/AT.  CARDIO:  Regular rate and rhythm.   RESPIRATORY:  Nonlabored breathing, no accessory muscle use.   EXTREMITIES: Left lower extremity stump with exposed distal femur, dressed with betadyne-soaked kerlix, 4x4, dry kerlix, abd pads for cushioning, and ace wrap.  SKIN:  No jaundice, pallor, or cyanosis  NEURO:  Alert; answers questions appropriately    INTAKE & OUTPUT  I&O's Summary    14 Oct 2023 07:01  -  15 Oct 2023 07:00  --------------------------------------------------------  IN: 1033.2 mL / OUT: 0 mL / NET: 1033.2 mL    15 Oct 2023 07:01  -  15 Oct 2023 22:31  --------------------------------------------------------  IN: 880 mL / OUT: 0 mL / NET: 880 mL    I&O's Detail    14 Oct 2023 07:01  -  15 Oct 2023 07:00  --------------------------------------------------------  IN:    IV PiggyBack: 200 mL    Norepinephrine: 73.2 mL    Oral Fluid: 760 mL  Total IN: 1033.2 mL    OUT:    Other (mL): 0 mL    Voided (mL): 0 mL  Total OUT: 0 mL    Total NET: 1033.2 mL    15 Oct 2023 07:01  -  15 Oct 2023 22:31  --------------------------------------------------------  IN:    IV PiggyBack: 100 mL    Oral Fluid: 780 mL  Total IN: 880 mL    OUT:    Voided (mL): 0 mL  Total OUT: 0 mL    Total NET: 880 mL    MEDICATIONS  MEDICATIONS  (STANDING):  atorvastatin 40 milliGRAM(s) Oral at bedtime  dextrose 5%. 1000 milliLiter(s) (50 mL/Hr) IV Continuous <Continuous>  dextrose 5%. 1000 milliLiter(s) (100 mL/Hr) IV Continuous <Continuous>  dextrose 50% Injectable 25 Gram(s) IV Push once  dextrose 50% Injectable 12.5 Gram(s) IV Push once  dextrose 50% Injectable 25 Gram(s) IV Push once  dorzolamide 2%/timolol 0.5% Ophthalmic Solution 1 Drop(s) Both EYES two times a day  glucagon  Injectable 1 milliGRAM(s) IntraMuscular once  heparin   Injectable 5000 Unit(s) SubCutaneous every 12 hours  insulin glargine Injectable (LANTUS) 13 Unit(s) SubCutaneous at bedtime  insulin lispro (ADMELOG) corrective regimen sliding scale   SubCutaneous Before meals and at bedtime  insulin lispro Injectable (ADMELOG) 5 Unit(s) SubCutaneous three times a day before meals  lactobacillus acidophilus 1 Tablet(s) Oral every 12 hours  midodrine. 20 milliGRAM(s) Oral three times a day  mupirocin 2% Nasal 1 Application(s) Both Nostrils two times a day  piperacillin/tazobactam IVPB.. 3.375 Gram(s) IV Intermittent every 12 hours    MEDICATIONS  (PRN):  acetaminophen     Tablet .. 650 milliGRAM(s) Oral every 4 hours PRN Temp greater or equal to 38C (100.4F), Mild Pain (1 - 3)  aluminum hydroxide/magnesium hydroxide/simethicone Suspension 30 milliLiter(s) Oral every 4 hours PRN Dyspepsia  HYDROmorphone  Injectable 0.5 milliGRAM(s) IV Push every 4 hours PRN Severe Pain (7 - 10)  melatonin 3 milliGRAM(s) Oral at bedtime PRN Insomnia  ondansetron Injectable 4 milliGRAM(s) IV Push every 8 hours PRN Nausea and/or Vomiting  oxyCODONE    IR 5 milliGRAM(s) Oral every 6 hours PRN Moderate Pain (4 - 6)    LABS:                        9.5    11.77 )-----------( 406      ( 15 Oct 2023 10:40 )             30.5     10-15    138  |  102  |  27<H>  ----------------------------<  97  3.8   |  29  |  6.50<H>    Ca    8.6      15 Oct 2023 10:40  Phos  3.1     10-15  Mg     2.4     10-15    TPro  7.2  /  Alb  1.9<L>  /  TBili  0.5  /  DBili  x   /  AST  11<L>  /  ALT  13  /  AlkPhos  134<H>  10-15    ASSESSMENT & PLAN   48 year old male POD#4 s/p emergent disarticulation of Left knee for gas gangrene.    - Continue zosyn per ID  - Monitor WBC  - Daily dressing changes  - Pain control PRN  - DVT prophylaxis with SQH  - Incentive spirometry  - PT/OT  - Plan for AKA this week  - Will endorse to day team to follow up and discuss with attending

## 2023-10-15 NOTE — PROGRESS NOTE ADULT - SUBJECTIVE AND OBJECTIVE BOX
Patient is a 48y Male whom presented to the hospital with esrd on hd     PAST MEDICAL & SURGICAL HISTORY:  ESRD on dialysis      H/O hypotension      Diabetes mellitus      Leg wound, left      Steal syndrome dialysis vascular access      Gangrene of finger of right hand      PVD (peripheral vascular disease)      Anemia of chronic disease      Constipation      HLD (hyperlipidemia)      S/P BKA (below knee amputation) bilateral      AV fistula          MEDICATIONS  (STANDING):  atorvastatin 40 milliGRAM(s) Oral at bedtime  collagenase Ointment 1 Application(s) Topical daily  dextrose 5%. 1000 milliLiter(s) (50 mL/Hr) IV Continuous <Continuous>  dextrose 5%. 1000 milliLiter(s) (100 mL/Hr) IV Continuous <Continuous>  dextrose 50% Injectable 25 Gram(s) IV Push once  dextrose 50% Injectable 25 Gram(s) IV Push once  dextrose 50% Injectable 12.5 Gram(s) IV Push once  dorzolamide 2%/timolol 0.5% Ophthalmic Solution 1 Drop(s) Both EYES two times a day  glucagon  Injectable 1 milliGRAM(s) IntraMuscular once  heparin   Injectable 5000 Unit(s) SubCutaneous every 12 hours  insulin lispro (ADMELOG) corrective regimen sliding scale   SubCutaneous every 6 hours  lactobacillus acidophilus 1 Tablet(s) Oral every 12 hours  midodrine. 10 milliGRAM(s) Oral three times a day  pantoprazole    Tablet 40 milliGRAM(s) Oral before breakfast  piperacillin/tazobactam IVPB.. 3.375 Gram(s) IV Intermittent every 12 hours  senna 2 Tablet(s) Oral at bedtime      Allergies    No Known Drug Allergies  [place] (Rash)    Intolerances        SOCIAL HISTORY:  Denies ETOh,Smoking,     FAMILY HISTORY:      REVIEW OF SYSTEMS:    CONSTITUTIONAL: No weakness, fevers or chills  EYES/ENT: No visual changes;  no throat pain   NECK: No pain or stiffness  RESPIRATORY: No cough, wheezing, hemoptysis; No shortness of breath  CARDIOVASCULAR: No chest pain or palpitations  GASTROINTESTINAL: No abdominal or epigastric pain. No nausea, vomiting,     No diarrhea or constipation. No melena                                                     11.1   10.99 )-----------( 464      ( 14 Oct 2023 06:04 )             35.4       CBC Full  -  ( 14 Oct 2023 06:04 )  WBC Count : 10.99 K/uL  RBC Count : 3.78 M/uL  Hemoglobin : 11.1 g/dL  Hematocrit : 35.4 %  Platelet Count - Automated : 464 K/uL  Mean Cell Volume : 93.7 fl  Mean Cell Hemoglobin : 29.4 pg  Mean Cell Hemoglobin Concentration : 31.4 gm/dL  Auto Neutrophil # : 6.98 K/uL  Auto Lymphocyte # : 2.12 K/uL  Auto Monocyte # : 0.91 K/uL  Auto Eosinophil # : 0.49 K/uL  Auto Basophil # : 0.12 K/uL  Auto Neutrophil % : 63.4 %  Auto Lymphocyte % : 19.3 %  Auto Monocyte % : 8.3 %  Auto Eosinophil % : 4.5 %  Auto Basophil % : 1.1 %      10-14    133<L>  |  95<L>  |  42<H>  ----------------------------<  253<H>  4.3   |  24  |  8.40<H>    Ca    8.8      14 Oct 2023 06:04  Phos  4.6     10-14  Mg     2.5     10-14    TPro  7.4  /  Alb  2.0<L>  /  TBili  0.6  /  DBili  x   /  AST  14<L>  /  ALT  14  /  AlkPhos  136<H>  10-14      CAPILLARY BLOOD GLUCOSE      POCT Blood Glucose.: 199 mg/dL (15 Oct 2023 07:17)  POCT Blood Glucose.: 165 mg/dL (14 Oct 2023 21:52)  POCT Blood Glucose.: 144 mg/dL (14 Oct 2023 16:53)  POCT Blood Glucose.: 131 mg/dL (14 Oct 2023 11:54)      Vital Signs Last 24 Hrs  T(C): 36.9 (15 Oct 2023 07:54), Max: 36.9 (14 Oct 2023 19:15)  T(F): 98.5 (15 Oct 2023 07:54), Max: 98.5 (14 Oct 2023 19:15)  HR: 59 (15 Oct 2023 10:00) (59 - 93)  BP: 123/67 (15 Oct 2023 10:00) (76/45 - 227/106)  BP(mean): 89 (15 Oct 2023 10:00) (56 - 152)  RR: 18 (15 Oct 2023 10:00) (0 - 29)  SpO2: 100% (15 Oct 2023 10:00) (93% - 100%)    Parameters below as of 15 Oct 2023 06:00  Patient On (Oxygen Delivery Method): room air        Urinalysis Basic - ( 14 Oct 2023 06:04 )    Color: x / Appearance: x / SG: x / pH: x  Gluc: 253 mg/dL / Ketone: x  / Bili: x / Urobili: x   Blood: x / Protein: x / Nitrite: x   Leuk Esterase: x / RBC: x / WBC x   Sq Epi: x / Non Sq Epi: x / Bacteria: x                PHYSICAL EXAM:    Constitutional: NAD  HEENT: conjunctive   clear   Neck:  No JVD  Respiratory: CTAB  Cardiovascular: S1 and S2  Gastrointestinal: BS+, soft, NT/ND  Extremities: No peripheral edema  Neurological: A/O x 3, no focal deficits  Psychiatric: Normal mood, normal affect  : No [place]  Skin: No rashes   S/P BKA (below knee amputation) bilateral  AV fistula  LABS:

## 2023-10-15 NOTE — PROGRESS NOTE ADULT - SUBJECTIVE AND OBJECTIVE BOX
Patient is a 48y Male with a known history of :  Sepsis [A41.9]    Leg wound, left [S81.802A]    ESRD on hemodialysis [N18.6]    Elevated lactic acid level [R79.89]    Dehydration [E86.0]    Prophylactic measure [Z29.9]    DM type 2, not at goal [E11.9]    DM (diabetes mellitus), type 2 [E11.9]    PVD (peripheral vascular disease) [I73.9]    HLD (hyperlipidemia) [E78.5]    Hypotension [I95.9]    Tachycardia [R00.0]    Unstageable pressure ulcer of stump of below knee amputation [T87.89]    Necrotizing fasciitis of lower leg [M72.6]      HPI:  49 yo malewith PMH of DM2, b/l BKA, ESRD (on HD MWF) ,infection  right third finger 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 Sin/Dr. Singh) Presents to ED [facility] 10/10 with weakness for 2 weeks. 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 Wound care consult and ID consults are requested . (10 Oct 2023 12:52)      REVIEW OF SYSTEMS:    CONSTITUTIONAL: No fever, weight loss, or fatigue  EYES: No eye pain, visual disturbances, or discharge  ENMT:  No difficulty hearing, tinnitus, vertigo; No sinus or throat pain  NECK: No pain or stiffness  BREASTS: No pain, masses, or nipple discharge  RESPIRATORY: No cough, wheezing, chills or hemoptysis; No shortness of breath  CARDIOVASCULAR: No chest pain, palpitations, dizziness, or leg swelling  GASTROINTESTINAL: No abdominal or epigastric pain. No nausea, vomiting, or hematemesis; No diarrhea or constipation. No melena or hematochezia.  GENITOURINARY: No dysuria, frequency, hematuria, or incontinence  NEUROLOGICAL: No headaches, memory loss, loss of strength, numbness, or tremors  SKIN: No itching, burning, rashes, or lesions   LYMPH NODES: No enlarged glands  ENDOCRINE: No heat or cold intolerance; No hair loss  MUSCULOSKELETAL: No joint pain or swelling; No muscle, back, or extremity pain  PSYCHIATRIC: No depression, anxiety, mood swings, or difficulty sleeping  HEME/LYMPH: No easy bruising, or bleeding gums  ALLERGY AND IMMUNOLOGIC: No hives or eczema    MEDICATIONS  (STANDING):  atorvastatin 40 milliGRAM(s) Oral at bedtime  dextrose 5%. 1000 milliLiter(s) (50 mL/Hr) IV Continuous <Continuous>  dextrose 5%. 1000 milliLiter(s) (100 mL/Hr) IV Continuous <Continuous>  dextrose 50% Injectable 25 Gram(s) IV Push once  dextrose 50% Injectable 25 Gram(s) IV Push once  dextrose 50% Injectable 12.5 Gram(s) IV Push once  dorzolamide 2%/timolol 0.5% Ophthalmic Solution 1 Drop(s) Both EYES two times a day  glucagon  Injectable 1 milliGRAM(s) IntraMuscular once  heparin   Injectable 5000 Unit(s) SubCutaneous every 12 hours  insulin glargine Injectable (LANTUS) 15 Unit(s) SubCutaneous at bedtime  insulin lispro (ADMELOG) corrective regimen sliding scale   SubCutaneous Before meals and at bedtime  insulin lispro Injectable (ADMELOG) 7 Unit(s) SubCutaneous three times a day before meals  lactobacillus acidophilus 1 Tablet(s) Oral every 12 hours  midodrine. 20 milliGRAM(s) Oral three times a day  mupirocin 2% Nasal 1 Application(s) Both Nostrils two times a day  piperacillin/tazobactam IVPB.. 3.375 Gram(s) IV Intermittent every 12 hours    MEDICATIONS  (PRN):  acetaminophen     Tablet .. 650 milliGRAM(s) Oral every 4 hours PRN Temp greater or equal to 38C (100.4F), Mild Pain (1 - 3)  aluminum hydroxide/magnesium hydroxide/simethicone Suspension 30 milliLiter(s) Oral every 4 hours PRN Dyspepsia  HYDROmorphone  Injectable 0.5 milliGRAM(s) IV Push every 4 hours PRN Severe Pain (7 - 10)  melatonin 3 milliGRAM(s) Oral at bedtime PRN Insomnia  ondansetron Injectable 4 milliGRAM(s) IV Push every 8 hours PRN Nausea and/or Vomiting  oxyCODONE    IR 5 milliGRAM(s) Oral every 6 hours PRN Moderate Pain (4 - 6)      ALLERGIES: No Known Drug Allergies  [place] (Rash)      FAMILY HISTORY:      PHYSICAL EXAMINATION:  -----------------------------  T(C): 36.9 (10-15-23 @ 07:54), Max: 36.9 (10-14-23 @ 19:15)  HR: 59 (10-15-23 @ 10:00) (59 - 93)  BP: 123/67 (10-15-23 @ 10:00) (76/45 - 227/106)  RR: 18 (10-15-23 @ 10:00) (0 - 29)  SpO2: 100% (10-15-23 @ 10:00) (93% - 100%)  Wt(kg): --    10-14 @ 07:01  -  10-15 @ 07:00  --------------------------------------------------------  IN:    IV PiggyBack: 200 mL    Norepinephrine: 73.2 mL    Oral Fluid: 760 mL  Total IN: 1033.2 mL    OUT:    Other (mL): 0 mL    Voided (mL): 0 mL  Total OUT: 0 mL    Total NET: 1033.2 mL            VITALS  T(C): 36.9 (10-15-23 @ 07:54), Max: 36.9 (10-14-23 @ 19:15)  HR: 59 (10-15-23 @ 10:00) (59 - 93)  BP: 123/67 (10-15-23 @ 10:00) (76/45 - 227/106)  RR: 18 (10-15-23 @ 10:00) (0 - 29)  SpO2: 100% (10-15-23 @ 10:00) (93% - 100%)    Constitutional: well developed, normal appearance, well groomed, well nourished, no deformities and no acute distress.   Eyes: the conjunctiva exhibited no abnormalities and the eyelids demonstrated no xanthelasmas.   HEENT: normal oral mucosa, no oral pallor and no oral cyanosis.   Neck: normal jugular venous A waves present, normal jugular venous V waves present and no jugular venous dolan A waves.   Pulmonary: no respiratory distress, normal respiratory rhythm and effort, no accessory muscle use and lungs were clear to auscultation bilaterally.   Cardiovascular: heart rate and rhythm were normal, normal S1 and S2 and no murmur, gallop, rub, heave or thrill are present.   Abdomen: soft, non-tender, no hepato-splenomegaly and no abdominal mass palpated.   Musculoskeletal: the gait could not be assessed..   Extremities: no clubbing of the fingernails, no localized cyanosis, no petechial hemorrhages and no ischemic changes.   Skin: normal skin color and pigmentation, no rash, no venous stasis, no skin lesions, no skin ulcer and no xanthoma was observed.   Psychiatric: oriented to person, place, and time, the affect was normal, the mood was normal and not feeling anxious.     LABS:   --------  10-14    133<L>  |  95<L>  |  42<H>  ----------------------------<  253<H>  4.3   |  24  |  8.40<H>    Ca    8.8      14 Oct 2023 06:04  Phos  4.6     10-14  Mg     2.5     10-14    TPro  7.4  /  Alb  2.0<L>  /  TBili  0.6  /  DBili  x   /  AST  14<L>  /  ALT  14  /  AlkPhos  136<H>  10-14                         11.1   10.99 )-----------( 464      ( 14 Oct 2023 06:04 )             35.4                 RADIOLOGY:  -----------------    ECG:     ECHO:

## 2023-10-16 LAB
ANION GAP SERPL CALC-SCNC: 12 MMOL/L — SIGNIFICANT CHANGE UP (ref 5–17)
BASOPHILS # BLD AUTO: 0.06 K/UL — SIGNIFICANT CHANGE UP (ref 0–0.2)
BASOPHILS NFR BLD AUTO: 0.5 % — SIGNIFICANT CHANGE UP (ref 0–2)
BUN SERPL-MCNC: 38 MG/DL — HIGH (ref 7–23)
CALCIUM SERPL-MCNC: 8.6 MG/DL — SIGNIFICANT CHANGE UP (ref 8.5–10.1)
CHLORIDE SERPL-SCNC: 99 MMOL/L — SIGNIFICANT CHANGE UP (ref 96–108)
CO2 SERPL-SCNC: 23 MMOL/L — SIGNIFICANT CHANGE UP (ref 22–31)
CREAT SERPL-MCNC: 8.1 MG/DL — HIGH (ref 0.5–1.3)
EGFR: 8 ML/MIN/1.73M2 — LOW
EOSINOPHIL # BLD AUTO: 0.66 K/UL — HIGH (ref 0–0.5)
EOSINOPHIL NFR BLD AUTO: 5.8 % — SIGNIFICANT CHANGE UP (ref 0–6)
GLUCOSE SERPL-MCNC: 255 MG/DL — HIGH (ref 70–99)
HCT VFR BLD CALC: 31.4 % — LOW (ref 39–50)
HGB BLD-MCNC: 9.8 G/DL — LOW (ref 13–17)
IMM GRANULOCYTES NFR BLD AUTO: 4.9 % — HIGH (ref 0–0.9)
LYMPHOCYTES # BLD AUTO: 2.34 K/UL — SIGNIFICANT CHANGE UP (ref 1–3.3)
LYMPHOCYTES # BLD AUTO: 20.5 % — SIGNIFICANT CHANGE UP (ref 13–44)
MAGNESIUM SERPL-MCNC: 2.5 MG/DL — SIGNIFICANT CHANGE UP (ref 1.6–2.6)
MCHC RBC-ENTMCNC: 29.5 PG — SIGNIFICANT CHANGE UP (ref 27–34)
MCHC RBC-ENTMCNC: 31.2 GM/DL — LOW (ref 32–36)
MCV RBC AUTO: 94.6 FL — SIGNIFICANT CHANGE UP (ref 80–100)
MONOCYTES # BLD AUTO: 1.12 K/UL — HIGH (ref 0–0.9)
MONOCYTES NFR BLD AUTO: 9.8 % — SIGNIFICANT CHANGE UP (ref 2–14)
NEUTROPHILS # BLD AUTO: 6.7 K/UL — SIGNIFICANT CHANGE UP (ref 1.8–7.4)
NEUTROPHILS NFR BLD AUTO: 58.5 % — SIGNIFICANT CHANGE UP (ref 43–77)
NRBC # BLD: 0 /100 WBCS — SIGNIFICANT CHANGE UP (ref 0–0)
PHOSPHATE SERPL-MCNC: 4.4 MG/DL — SIGNIFICANT CHANGE UP (ref 2.5–4.5)
PLATELET # BLD AUTO: 464 K/UL — HIGH (ref 150–400)
POTASSIUM SERPL-MCNC: 3.6 MMOL/L — SIGNIFICANT CHANGE UP (ref 3.5–5.3)
POTASSIUM SERPL-SCNC: 3.6 MMOL/L — SIGNIFICANT CHANGE UP (ref 3.5–5.3)
RBC # BLD: 3.32 M/UL — LOW (ref 4.2–5.8)
RBC # FLD: 14.8 % — HIGH (ref 10.3–14.5)
SODIUM SERPL-SCNC: 134 MMOL/L — LOW (ref 135–145)
WBC # BLD: 11.44 K/UL — HIGH (ref 3.8–10.5)
WBC # FLD AUTO: 11.44 K/UL — HIGH (ref 3.8–10.5)

## 2023-10-16 PROCEDURE — 99233 SBSQ HOSP IP/OBS HIGH 50: CPT | Mod: GC

## 2023-10-16 RX ADMIN — INSULIN GLARGINE 13 UNIT(S): 100 INJECTION, SOLUTION SUBCUTANEOUS at 21:20

## 2023-10-16 RX ADMIN — Medication 1 TABLET(S): at 17:49

## 2023-10-16 RX ADMIN — Medication 5 UNIT(S): at 13:01

## 2023-10-16 RX ADMIN — Medication 1: at 21:20

## 2023-10-16 RX ADMIN — MIDODRINE HYDROCHLORIDE 20 MILLIGRAM(S): 2.5 TABLET ORAL at 17:49

## 2023-10-16 RX ADMIN — PIPERACILLIN AND TAZOBACTAM 25 GRAM(S): 4; .5 INJECTION, POWDER, LYOPHILIZED, FOR SOLUTION INTRAVENOUS at 23:17

## 2023-10-16 RX ADMIN — OXYCODONE HYDROCHLORIDE 5 MILLIGRAM(S): 5 TABLET ORAL at 12:53

## 2023-10-16 RX ADMIN — HEPARIN SODIUM 5000 UNIT(S): 5000 INJECTION INTRAVENOUS; SUBCUTANEOUS at 05:59

## 2023-10-16 RX ADMIN — MIDODRINE HYDROCHLORIDE 20 MILLIGRAM(S): 2.5 TABLET ORAL at 06:01

## 2023-10-16 RX ADMIN — PIPERACILLIN AND TAZOBACTAM 25 GRAM(S): 4; .5 INJECTION, POWDER, LYOPHILIZED, FOR SOLUTION INTRAVENOUS at 00:56

## 2023-10-16 RX ADMIN — HYDROMORPHONE HYDROCHLORIDE 0.5 MILLIGRAM(S): 2 INJECTION INTRAMUSCULAR; INTRAVENOUS; SUBCUTANEOUS at 19:27

## 2023-10-16 RX ADMIN — Medication 5 UNIT(S): at 08:12

## 2023-10-16 RX ADMIN — DORZOLAMIDE HYDROCHLORIDE TIMOLOL MALEATE 1 DROP(S): 20; 5 SOLUTION/ DROPS OPHTHALMIC at 17:50

## 2023-10-16 RX ADMIN — Medication 1 TABLET(S): at 06:00

## 2023-10-16 RX ADMIN — DORZOLAMIDE HYDROCHLORIDE TIMOLOL MALEATE 1 DROP(S): 20; 5 SOLUTION/ DROPS OPHTHALMIC at 06:01

## 2023-10-16 RX ADMIN — HEPARIN SODIUM 5000 UNIT(S): 5000 INJECTION INTRAVENOUS; SUBCUTANEOUS at 17:49

## 2023-10-16 RX ADMIN — MIDODRINE HYDROCHLORIDE 20 MILLIGRAM(S): 2.5 TABLET ORAL at 12:22

## 2023-10-16 RX ADMIN — Medication 5 UNIT(S): at 17:51

## 2023-10-16 RX ADMIN — OXYCODONE HYDROCHLORIDE 5 MILLIGRAM(S): 5 TABLET ORAL at 13:45

## 2023-10-16 RX ADMIN — Medication 1: at 17:50

## 2023-10-16 RX ADMIN — PIPERACILLIN AND TAZOBACTAM 25 GRAM(S): 4; .5 INJECTION, POWDER, LYOPHILIZED, FOR SOLUTION INTRAVENOUS at 12:22

## 2023-10-16 RX ADMIN — MUPIROCIN 1 APPLICATION(S): 20 OINTMENT TOPICAL at 17:49

## 2023-10-16 RX ADMIN — Medication 3: at 08:11

## 2023-10-16 RX ADMIN — HYDROMORPHONE HYDROCHLORIDE 0.5 MILLIGRAM(S): 2 INJECTION INTRAMUSCULAR; INTRAVENOUS; SUBCUTANEOUS at 20:00

## 2023-10-16 RX ADMIN — ATORVASTATIN CALCIUM 40 MILLIGRAM(S): 80 TABLET, FILM COATED ORAL at 21:20

## 2023-10-16 NOTE — PROGRESS NOTE ADULT - ASSESSMENT
REASON FOR VISIT  .. Management of problems listed below        REVIEW OF SYMPTOMS   Able to give ROS  Yes     RELIABILITY +/-   CONSTITUTIONAL Weakness Yes    ENDOCRINE  No heat or cold intolerance    ALLERGY No hives  Sore throat No stridor  RESP Shortness of breath YES   NEURO New weakness No   CARDIAC   Palpitations No         PHYSICAL EXAM    HEENT Unremarkable  atraumatic   RESP Fair air entry  Harsh breath sound   CARDIAC S1 S2 No S3     NO JVD    ABDOMEN No hepatosplenomegaly   bl bka  NO rash       GENERAL DATA .     GOC.  .. 10/10/2023 full code  ICU STAY. .. 10/11/2023   COVID. ..  scv2 10/10/2023 (-)  BEST PRACTICE ISSUES.    HOB ELEVATN. .. Yes  DVT PPLX. ..  10/10/2023 [facility]    SPEECH SWALLOW RECOMMENDATIONS.    ..        DIET.   ..  10/11 renal restrn   ..  10/10/2023 npo   IV fl ...   BOLUS.   .. 10/11/2023 4a ns 500   .. 10/10/2023 4p ns 250   .. 10/10/2023 12p ns 500  .. 10/10/2023 9a ns 1000   STRESS ULCER PPLX. ..    10/10/2023 protonix 40  INFECTION PPLX. ..   10/13 mupirocin 2%   ALLGY. ..   tur[place]                      WT. ..  10/10/2023 59  BMI. ..      PROCEDURES.  .. 10/11/2023 l knee disarticulation   PRESENT ON ADMISSION.  GT.     ABGS.   .     VS/ PO/IO/ VENT/ DRIPS.  10/16/2023 afeb 63 120/50   10/16/2023 ra 100%     ADMISSION SUMMARY.   . 48 m PMH esrd admitted 10/10 with shock found to have necroitizing fascitis   . Pt had l knee disarticulation done 10/11     MAIN ISSUES   . SHOCK   . SEPSIS   . SKIN SOFT TISSUE CELLULITIS  10/10 l post knee pressure ulcer   . NECROITIZING FASCITIS 10/10 L knee ct   . l KNEE DISARTICULATION 10/11   .. 10/11 surg strep agalacticae gp b e coli sens piptaz   . ESRD     OVERALL PLAN  . SSTI On vanco started 10/10 On Vanco started 10/10   . NECROTIZING FASCITIS 10/11/2023   . SHOCK 10/12-10/14/2023 Nore  Target MAP 65 (+)  . SP Urgent DISARTICULATION l knee 10/11   ..  RTOR 10/19 for AKA closure     TIME SPENT.   . Over 36 minutes aggregate care time spent on encounter; activities included   direct patient care, counseling and/or coordinating care reviewing notes, lab data/ imaging , discussion with multidisciplinary team/ patient  /family and explaining in detail risks, benefits, alternatives  of the recommendations     MICHAEL TIRADO 48 m 10/10/2023 1974 DR ELENA SCHMUTER DR MOHSEN PAHLAVAN

## 2023-10-16 NOTE — PROGRESS NOTE ADULT - ASSESSMENT
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 Sin/Dr. Singh) Presents to ED [facility] 10/10 with weakness for 2 weeks    anemia  esrd  weakness  DM  BKA  PAD  PVD  Pain    post op -   on midodrine  vs noted  on ABX  on Opioids for pain  labs reviewed  ICU care in progress    full code  lives with family at home - in [facility] as per renal  pain relief  oral hygiene  skin care  wound care  assist with needs

## 2023-10-16 NOTE — CHART NOTE - NSCHARTNOTEFT_GEN_A_CORE
Assessment: Pt seen for nutrition follow-up. Chart reviewed, hospital course noted.    Brief hx: Pt is a "48 yr old male with PMHx of DM2, ESRD on H.D. 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 Pt s/p emergent left knee disarticulation on 10/11 for necrotizing fasciitis of BKA stump. Pt consuming lunch meal during visit. Observed good intake of meal. Reports good appetite/intake. Tolerating diet well. No N/V/D/C. +BM 10/16. Pt with 4 episodes of soft stool yesterday, bowel regimen now discontinued. Continues on consistent carbohydrate, renal diet. A1c 7.1%.  Receiving lantus 13u qhs and admelog 5u premeal. S/p HD today. K and Phos WNL. Food preferences obtained to optimize po intake/tolerance. Pt agreeable to receive Nepro shake TID for additional kcal/protein. Recommend assistance/encouragement at meal times as needed.     Factors impacting intake: [X] none [ ] nausea  [ ] vomiting [ ] diarrhea [ ] constipation  [ ]chewing problems [ ] swallowing issues  [ ] other:     Diet Prescription: Diet, DASH/TLC:   Sodium & Cholesterol Restricted  Consistent Carbohydrate {Evening Snack}  For patients receiving Renal Replacement - No Protein Restr, No Conc K, No Conc Phos, Low Sodium (RENAL) (10-14-23 @ 08:42)    Intake: good    Current Weight: Weight (kg): 64 (10-11 @ 17:45), 10/16 145# (no edema noted)  % Weight Change    Pertinent Medications: MEDICATIONS  (STANDING):  atorvastatin 40 milliGRAM(s) Oral at bedtime  dextrose 5%. 1000 milliLiter(s) (50 mL/Hr) IV Continuous <Continuous>  dextrose 5%. 1000 milliLiter(s) (100 mL/Hr) IV Continuous <Continuous>  dextrose 50% Injectable 25 Gram(s) IV Push once  dextrose 50% Injectable 25 Gram(s) IV Push once  dextrose 50% Injectable 12.5 Gram(s) IV Push once  dorzolamide 2%/timolol 0.5% Ophthalmic Solution 1 Drop(s) Both EYES two times a day  glucagon  Injectable 1 milliGRAM(s) IntraMuscular once  heparin   Injectable 5000 Unit(s) SubCutaneous every 12 hours  insulin glargine Injectable (LANTUS) 13 Unit(s) SubCutaneous at bedtime  insulin lispro (ADMELOG) corrective regimen sliding scale   SubCutaneous Before meals and at bedtime  insulin lispro Injectable (ADMELOG) 5 Unit(s) SubCutaneous three times a day before meals  lactobacillus acidophilus 1 Tablet(s) Oral every 12 hours  midodrine. 20 milliGRAM(s) Oral three times a day  mupirocin 2% Nasal 1 Application(s) Both Nostrils two times a day  piperacillin/tazobactam IVPB.. 3.375 Gram(s) IV Intermittent every 12 hours    MEDICATIONS  (PRN):  acetaminophen     Tablet .. 650 milliGRAM(s) Oral every 4 hours PRN Temp greater or equal to 38C (100.4F), Mild Pain (1 - 3)  aluminum hydroxide/magnesium hydroxide/simethicone Suspension 30 milliLiter(s) Oral every 4 hours PRN Dyspepsia  HYDROmorphone  Injectable 0.5 milliGRAM(s) IV Push every 4 hours PRN Severe Pain (7 - 10)  melatonin 3 milliGRAM(s) Oral at bedtime PRN Insomnia  ondansetron Injectable 4 milliGRAM(s) IV Push every 8 hours PRN Nausea and/or Vomiting  oxyCODONE    IR 5 milliGRAM(s) Oral every 6 hours PRN Moderate Pain (4 - 6)    Pertinent Labs: 10-16 Na134 mmol/L<L> Glu 255 mg/dL<H> K+ 3.6 mmol/L Cr  8.10 mg/dL<H> BUN 38 mg/dL<H> 10-16 Phos 4.4 mg/dL 10-15 Alb 1.9 g/dL<L> 10-11 Chol 51 mg/dL LDL --    HDL 18 mg/dL<L> Trig 68 mg/dL    CAPILLARY BLOOD GLUCOSE  POCT Blood Glucose.: 124 mg/dL (16 Oct 2023 11:38)  POCT Blood Glucose.: 274 mg/dL (16 Oct 2023 07:43)  POCT Blood Glucose.: 262 mg/dL (15 Oct 2023 21:44)  POCT Blood Glucose.: 162 mg/dL (15 Oct 2023 16:53)    Skin: L AKA, stage I to sacrum, stage I to BL buttocks    Estimated Needs:   [X] no change since previous assessment: based on admit wt 132#/59.8kg  30-35kcal/kg (1794-2093kcal)  1.4-1.6 (83-95gm protein)  [ ] recalculated:     Previous Nutrition Diagnosis:   [ ] Inadequate Energy Intake [ ]Inadequate Oral Intake [ ] Excessive Energy Intake   [ ] Underweight [X] Increased Nutrient Needs [ ] Overweight/Obesity   [X] Altered Nutrition Related Lab Values [ ] Unintended Weight Loss [ ] Food & Nutrition Related Knowledge Deficit [ ] Malnutrition     Nutrition Diagnosis is [X] ongoing  [ ] resolved [ ] not applicable     New Nutrition Diagnosis: [ ] not applicable     Interventions:   Recommend  [ ] Change Diet To:  [X] Nutrition Supplement: Recommend Nepro shake TID  [ ] Nutrition Support  [X] Other: Continue consistent carbohydrate, renal diet  Recommend Nephrovite daily     Monitoring and Evaluation:   [X] PO intake [ x ] Tolerance to diet prescription [ x ] weights [ x ] labs[ x ] follow up per protocol  [X] other: s/s GI distress, bowel function, skin integrity/ edema

## 2023-10-16 NOTE — PROGRESS NOTE ADULT - SUBJECTIVE AND OBJECTIVE BOX
CHIEF COMPLAINT/ REASON FOR VISIT  .. Patient was seen to address the  issue listed under PROBLEM LIST which is located toward bottom of this note     MELVI RODRIGUEZ    [facility] 07A    Allergies    No Known Drug Allergies  [place] (Rash)    Intolerances        PAST MEDICAL & SURGICAL HISTORY:  ESRD on dialysis      H/O hypotension      Diabetes mellitus      Leg wound, left      Steal syndrome dialysis vascular access      Gangrene of finger of right hand      PVD (peripheral vascular disease)      Anemia of chronic disease      Constipation      HLD (hyperlipidemia)      S/P BKA (below knee amputation) bilateral      AV fistula          FAMILY HISTORY:      Home Medications:  Admelog 100 units/mL injectable solution: 4 unit(s) subcutaneously 3 times a day (10 Oct 2023 14:32)  amLODIPine 5 mg oral tablet: 1 tab(s) orally once a day (10 Oct 2023 14:32)  atorvastatin 40 mg oral tablet: 1 tab(s) orally once a day (10 Oct 2023 14:32)  insulin glargine 100 units/mL subcutaneous solution: 4 unit(s) subcutaneous once a day (at bedtime) (10 Oct 2023 14:32)  senna (sennosides) 8.6 mg oral tablet: 2 tab(s) orally once a day (at bedtime) (10 Oct 2023 14:32)  Velphoro 500 mg oral tablet, chewable: 3 tab(s) chewed 3 times a day (10 Oct 2023 14:32)      MEDICATIONS  (STANDING):  atorvastatin 40 milliGRAM(s) Oral at bedtime  dextrose 5%. 1000 milliLiter(s) (100 mL/Hr) IV Continuous <Continuous>  dextrose 5%. 1000 milliLiter(s) (50 mL/Hr) IV Continuous <Continuous>  dextrose 50% Injectable 25 Gram(s) IV Push once  dextrose 50% Injectable 12.5 Gram(s) IV Push once  dextrose 50% Injectable 25 Gram(s) IV Push once  dorzolamide 2%/timolol 0.5% Ophthalmic Solution 1 Drop(s) Both EYES two times a day  glucagon  Injectable 1 milliGRAM(s) IntraMuscular once  heparin   Injectable 5000 Unit(s) SubCutaneous every 12 hours  insulin glargine Injectable (LANTUS) 13 Unit(s) SubCutaneous at bedtime  insulin lispro (ADMELOG) corrective regimen sliding scale   SubCutaneous Before meals and at bedtime  insulin lispro Injectable (ADMELOG) 5 Unit(s) SubCutaneous three times a day before meals  lactobacillus acidophilus 1 Tablet(s) Oral every 12 hours  midodrine. 20 milliGRAM(s) Oral three times a day  mupirocin 2% Nasal 1 Application(s) Both Nostrils two times a day  piperacillin/tazobactam IVPB.. 3.375 Gram(s) IV Intermittent every 12 hours    MEDICATIONS  (PRN):  acetaminophen     Tablet .. 650 milliGRAM(s) Oral every 4 hours PRN Temp greater or equal to 38C (100.4F), Mild Pain (1 - 3)  aluminum hydroxide/magnesium hydroxide/simethicone Suspension 30 milliLiter(s) Oral every 4 hours PRN Dyspepsia  HYDROmorphone  Injectable 0.5 milliGRAM(s) IV Push every 4 hours PRN Severe Pain (7 - 10)  melatonin 3 milliGRAM(s) Oral at bedtime PRN Insomnia  ondansetron Injectable 4 milliGRAM(s) IV Push every 8 hours PRN Nausea and/or Vomiting  oxyCODONE    IR 5 milliGRAM(s) Oral every 6 hours PRN Moderate Pain (4 - 6)      Diet, DASH/TLC:   Sodium & Cholesterol Restricted  Consistent Carbohydrate Evening Snack  For patients receiving Renal Replacement - No Protein Restr, No Conc K, No Conc Phos, Low Sodium (RENAL) (10-14-23 @ 08:42) [Active]          Vital Signs Last 24 Hrs  T(C): 36.5 (16 Oct 2023 08:20), Max: 36.7 (15 Oct 2023 12:14)  T(F): 97.7 (16 Oct 2023 08:20), Max: 98.1 (15 Oct 2023 12:14)  HR: 56 (16 Oct 2023 08:35) (54 - 71)  BP: 114/52 (16 Oct 2023 08:35) (93/48 - 165/75)  BP(mean): 75 (16 Oct 2023 08:35) (57 - 108)  RR: 17 (16 Oct 2023 08:35) (3 - 22)  SpO2: 100% (16 Oct 2023 08:35) (98% - 100%)          10-15-23 @ 07:01  -  10-16-23 @ 07:00  --------------------------------------------------------  IN: 980 mL / OUT: 0 mL / NET: 980 mL              LABS:                        9.8    11.44 )-----------( 464      ( 16 Oct 2023 06:10 )             31.4     10-16    134<L>  |  99  |  38<H>  ----------------------------<  255<H>  3.6   |  23  |  8.10<H>    Ca    8.6      16 Oct 2023 06:10  Phos  4.4     10-16  Mg     2.5     10-16    TPro  7.2  /  Alb  1.9<L>  /  TBili  0.5  /  DBili  x   /  AST  11<L>  /  ALT  13  /  AlkPhos  134<H>  10-15      Urinalysis Basic - ( 16 Oct 2023 06:10 )    Color: x / Appearance: x / SG: x / pH: x  Gluc: 255 mg/dL / Ketone: x  / Bili: x / Urobili: x   Blood: x / Protein: x / Nitrite: x   Leuk Esterase: x / RBC: x / WBC x   Sq Epi: x / Non Sq Epi: x / Bacteria: x            WBC:  WBC Count: 11.44 K/uL (10-16 @ 06:10)  WBC Count: 11.77 K/uL (10-15 @ 10:40)  WBC Count: 10.99 K/uL (10-14 @ 06:04)  WBC Count: 13.81 K/uL (10-13 @ 06:10)      MICROBIOLOGY:  RECENT CULTURES:  10-11 .Surgical Swab Surgical Swab Escherichia coli XXXX   Moderate Escherichia coli  Few Streptococcus agalactiae (Group B) Streptococcus agalactiae (Group B)  isolated  Group B streptococci are susceptible to ampicillin,  penicillin and cefazolin, but may be resistant to  erythromycin and clindamycin.  Rare Streptococcus mitis/oralis group "Susceptibilities not performed"  Numerous Actinomyces odontolyticus "Susceptibilities not performed"  Few Streptococcus agalactiae (Group B) isolated  Group B streptococci are susceptible to ampicillin,  penicillin and cefazolin, but may be resistant to  erythromycin and clindamycin.    10-10 .Blood Blood-Peripheral XXXX XXXX   No growth at 5 days    10-10 .Blood Blood-Peripheral XXXX XXXX   No growth at 5 days    10-10 Skin Skin Escherichia coli XXXX   Culture yields growth of greater than 3 colony types of  bacteria,  which may indicate contamination and normal yoana  Call client services within 7 days if further workup is clinically  indicated. Culture includes  Numerous Streptococcus agalactiae (Group B) isolated  Group B streptococci are susceptible to ampicillin,  penicillin and cefazolin, but may be resistant to  erythromycin and clindamycin.  Numerous Escherichia coli                    Sodium:  Sodium: 134 mmol/L (10-16 @ 06:10)  Sodium: 138 mmol/L (10-15 @ 10:40)  Sodium: 133 mmol/L (10-14 @ 06:04)  Sodium: 133 mmol/L (10-13 @ 06:10)      8.10 mg/dL 10-16 @ 06:10  6.50 mg/dL 10-15 @ 10:40  8.40 mg/dL 10-14 @ 06:04  6.90 mg/dL 10-13 @ 06:10      Hemoglobin:  Hemoglobin: 9.8 g/dL (10-16 @ 06:10)  Hemoglobin: 9.5 g/dL (10-15 @ 10:40)  Hemoglobin: 11.1 g/dL (10-14 @ 06:04)  Hemoglobin: 10.3 g/dL (10-13 @ 06:10)      Platelets: Platelet Count - Automated: 464 K/uL (10-16 @ 06:10)  Platelet Count - Automated: 406 K/uL (10-15 @ 10:40)  Platelet Count - Automated: 464 K/uL (10-14 @ 06:04)  Platelet Count - Automated: 458 K/uL (10-13 @ 06:10)      LIVER FUNCTIONS - ( 15 Oct 2023 10:40 )  Alb: 1.9 g/dL / Pro: 7.2 g/dL / ALK PHOS: 134 U/L / ALT: 13 U/L / AST: 11 U/L / GGT: x             Urinalysis Basic - ( 16 Oct 2023 06:10 )    Color: x / Appearance: x / SG: x / pH: x  Gluc: 255 mg/dL / Ketone: x  / Bili: x / Urobili: x   Blood: x / Protein: x / Nitrite: x   Leuk Esterase: x / RBC: x / WBC x   Sq Epi: x / Non Sq Epi: x / Bacteria: x        RADIOLOGY & ADDITIONAL STUDIES:      MICROBIOLOGY:  RECENT CULTURES:  10-11 .Surgical Swab Surgical Swab Escherichia coli XXXX   Moderate Escherichia coli  Few Streptococcus agalactiae (Group B) Streptococcus agalactiae (Group B)  isolated  Group B streptococci are susceptible to ampicillin,  penicillin and cefazolin, but may be resistant to  erythromycin and clindamycin.  Rare Streptococcus mitis/oralis group "Susceptibilities not performed"  Numerous Actinomyces odontolyticus "Susceptibilities not performed"  Few Streptococcus agalactiae (Group B) isolated  Group B streptococci are susceptible to ampicillin,  penicillin and cefazolin, but may be resistant to  erythromycin and clindamycin.    10-10 .Blood Blood-Peripheral XXXX XXXX   No growth at 5 days    10-10 .Blood Blood-Peripheral XXXX XXXX   No growth at 5 days    10-10 Skin Skin Escherichia coli XXXX   Culture yields growth of greater than 3 colony types of  bacteria,  which may indicate contamination and normal yoana  Call client services within 7 days if further workup is clinically  indicated. Culture includes  Numerous Streptococcus agalactiae (Group B) isolated  Group B streptococci are susceptible to ampicillin,  penicillin and cefazolin, but may be resistant to  erythromycin and clindamycin.  Numerous Escherichia coli

## 2023-10-16 NOTE — PROGRESS NOTE ADULT - SUBJECTIVE AND OBJECTIVE BOX
Patient is a 48y Male whom presented to the hospital with esrd on hd     PAST MEDICAL & SURGICAL HISTORY:  ESRD on dialysis      H/O hypotension      Diabetes mellitus      Leg wound, left      Steal syndrome dialysis vascular access      Gangrene of finger of right hand      PVD (peripheral vascular disease)      Anemia of chronic disease      Constipation      HLD (hyperlipidemia)      S/P BKA (below knee amputation) bilateral      AV fistula          MEDICATIONS  (STANDING):  atorvastatin 40 milliGRAM(s) Oral at bedtime  collagenase Ointment 1 Application(s) Topical daily  dextrose 5%. 1000 milliLiter(s) (50 mL/Hr) IV Continuous <Continuous>  dextrose 5%. 1000 milliLiter(s) (100 mL/Hr) IV Continuous <Continuous>  dextrose 50% Injectable 25 Gram(s) IV Push once  dextrose 50% Injectable 25 Gram(s) IV Push once  dextrose 50% Injectable 12.5 Gram(s) IV Push once  dorzolamide 2%/timolol 0.5% Ophthalmic Solution 1 Drop(s) Both EYES two times a day  glucagon  Injectable 1 milliGRAM(s) IntraMuscular once  heparin   Injectable 5000 Unit(s) SubCutaneous every 12 hours  insulin lispro (ADMELOG) corrective regimen sliding scale   SubCutaneous every 6 hours  lactobacillus acidophilus 1 Tablet(s) Oral every 12 hours  midodrine. 10 milliGRAM(s) Oral three times a day  pantoprazole    Tablet 40 milliGRAM(s) Oral before breakfast  piperacillin/tazobactam IVPB.. 3.375 Gram(s) IV Intermittent every 12 hours  senna 2 Tablet(s) Oral at bedtime      Allergies    No Known Drug Allergies  [place] (Rash)    Intolerances        SOCIAL HISTORY:  Denies ETOh,Smoking,     FAMILY HISTORY:      REVIEW OF SYSTEMS:    CONSTITUTIONAL: No weakness, fevers or chills  EYES/ENT: No visual changes;  no throat pain   NECK: No pain or stiffness  RESPIRATORY: No cough, wheezing, hemoptysis; No shortness of breath  CARDIOVASCULAR: No chest pain or palpitations  GASTROINTESTINAL: No abdominal or epigastric pain. No nausea, vomiting,     No diarrhea or constipation. No melena                                                             9.8    11.44 )-----------( 464      ( 16 Oct 2023 06:10 )             31.4       CBC Full  -  ( 16 Oct 2023 06:10 )  WBC Count : 11.44 K/uL  RBC Count : 3.32 M/uL  Hemoglobin : 9.8 g/dL  Hematocrit : 31.4 %  Platelet Count - Automated : 464 K/uL  Mean Cell Volume : 94.6 fl  Mean Cell Hemoglobin : 29.5 pg  Mean Cell Hemoglobin Concentration : 31.2 gm/dL  Auto Neutrophil # : 6.70 K/uL  Auto Lymphocyte # : 2.34 K/uL  Auto Monocyte # : 1.12 K/uL  Auto Eosinophil # : 0.66 K/uL  Auto Basophil # : 0.06 K/uL  Auto Neutrophil % : 58.5 %  Auto Lymphocyte % : 20.5 %  Auto Monocyte % : 9.8 %  Auto Eosinophil % : 5.8 %  Auto Basophil % : 0.5 %      10-16    134<L>  |  99  |  38<H>  ----------------------------<  255<H>  3.6   |  23  |  8.10<H>    Ca    8.6      16 Oct 2023 06:10  Phos  4.4     10-16  Mg     2.5     10-16    TPro  7.2  /  Alb  1.9<L>  /  TBili  0.5  /  DBili  x   /  AST  11<L>  /  ALT  13  /  AlkPhos  134<H>  10-15      CAPILLARY BLOOD GLUCOSE      POCT Blood Glucose.: 190 mg/dL (16 Oct 2023 17:17)  POCT Blood Glucose.: 124 mg/dL (16 Oct 2023 11:38)  POCT Blood Glucose.: 274 mg/dL (16 Oct 2023 07:43)  POCT Blood Glucose.: 262 mg/dL (15 Oct 2023 21:44)      Vital Signs Last 24 Hrs  T(C): 36.8 (16 Oct 2023 15:26), Max: 36.8 (16 Oct 2023 15:26)  T(F): 98.3 (16 Oct 2023 15:26), Max: 98.3 (16 Oct 2023 15:26)  HR: 72 (16 Oct 2023 18:00) (54 - 72)  BP: 102/46 (16 Oct 2023 18:00) (91/42 - 135/64)  BP(mean): 66 (16 Oct 2023 18:00) (57 - 99)  RR: 12 (16 Oct 2023 18:00) (3 - 22)  SpO2: 95% (16 Oct 2023 18:00) (95% - 100%)    Parameters below as of 16 Oct 2023 11:55  Patient On (Oxygen Delivery Method): room air        Urinalysis Basic - ( 16 Oct 2023 06:10 )    Color: x / Appearance: x / SG: x / pH: x  Gluc: 255 mg/dL / Ketone: x  / Bili: x / Urobili: x   Blood: x / Protein: x / Nitrite: x   Leuk Esterase: x / RBC: x / WBC x   Sq Epi: x / Non Sq Epi: x / Bacteria: x                    PHYSICAL EXAM:    Constitutional: NAD  HEENT: conjunctive   clear   Neck:  No JVD  Respiratory: CTAB  Cardiovascular: S1 and S2  Gastrointestinal: BS+, soft, NT/ND  Extremities: No peripheral edema  Neurological: no focal deficits  Psychiatric: Normal mood, normal affect  : No [place]  Skin: No rashes   S/P BKA (below knee amputation) bilateral  AV fistula  LABS:

## 2023-10-16 NOTE — PROGRESS NOTE ADULT - SUBJECTIVE AND OBJECTIVE BOX
*******************************************INCOMPLETE***********************************************    INTERVAL HPI/OVERNIGHT EVENTS:    SUBJECTIVE: Patient seen and examined at bedside.     ROS:  Constitutional: no fever, chills, fatigue  Neuro: no headache, numbness, weakness, dizziness  Resp: no cough, wheezing, shortness of breath  CVS: no chest pain, palpitations, leg swelling  GI: no abdominal pain, nausea, vomiting, diarrhea   : no dysuria, frequency, incontinence  Skin: no itching, burning, rashes, or lesions   Msk: +LLE pain    OBJECTIVE:    VITAL SIGNS:  ICU Vital Signs Last 24 Hrs  T(C): 36.7 (16 Oct 2023 05:01), Max: 36.9 (15 Oct 2023 07:54)  T(F): 98 (16 Oct 2023 05:01), Max: 98.5 (15 Oct 2023 07:54)  HR: 55 (16 Oct 2023 07:00) (55 - 71)  BP: 102/50 (16 Oct 2023 07:00) (93/48 - 165/75)  BP(mean): 72 (16 Oct 2023 07:00) (57 - 108)  ABP: --  ABP(mean): --  RR: 15 (16 Oct 2023 07:00) (3 - 27)  SpO2: 100% (16 Oct 2023 07:00) (98% - 100%)          10-15 @ 07:01  -  10-16 @ 07:00  --------------------------------------------------------  IN: 980 mL / OUT: 0 mL / NET: 980 mL      CAPILLARY BLOOD GLUCOSE      POCT Blood Glucose.: 262 mg/dL (15 Oct 2023 21:44)      PHYSICAL EXAM:  General: NAD, comfortable  HEENT: NCAT, clear conjunctiva, no scleral icterus  Respiratory: CTA b/l, no wheezing, rhonchi, rales  Cardiovascular: RRR, normal S1S2, no M/R/G  Abdomen: soft, NT/ND, bowel sounds present  Extremities: LLE dressings C/D/I, +mild TTP, well-healed s/p R BKA and R hand 3rd digit amputation changes  Neurology: awake and alert    MEDICATIONS:  MEDICATIONS  (STANDING):  atorvastatin 40 milliGRAM(s) Oral at bedtime  dextrose 5%. 1000 milliLiter(s) (50 mL/Hr) IV Continuous <Continuous>  dextrose 5%. 1000 milliLiter(s) (100 mL/Hr) IV Continuous <Continuous>  dextrose 50% Injectable 25 Gram(s) IV Push once  dextrose 50% Injectable 25 Gram(s) IV Push once  dextrose 50% Injectable 12.5 Gram(s) IV Push once  dorzolamide 2%/timolol 0.5% Ophthalmic Solution 1 Drop(s) Both EYES two times a day  glucagon  Injectable 1 milliGRAM(s) IntraMuscular once  heparin   Injectable 5000 Unit(s) SubCutaneous every 12 hours  insulin glargine Injectable (LANTUS) 13 Unit(s) SubCutaneous at bedtime  insulin lispro (ADMELOG) corrective regimen sliding scale   SubCutaneous Before meals and at bedtime  insulin lispro Injectable (ADMELOG) 5 Unit(s) SubCutaneous three times a day before meals  lactobacillus acidophilus 1 Tablet(s) Oral every 12 hours  midodrine. 20 milliGRAM(s) Oral three times a day  mupirocin 2% Nasal 1 Application(s) Both Nostrils two times a day  piperacillin/tazobactam IVPB.. 3.375 Gram(s) IV Intermittent every 12 hours    MEDICATIONS  (PRN):  acetaminophen     Tablet .. 650 milliGRAM(s) Oral every 4 hours PRN Temp greater or equal to 38C (100.4F), Mild Pain (1 - 3)  aluminum hydroxide/magnesium hydroxide/simethicone Suspension 30 milliLiter(s) Oral every 4 hours PRN Dyspepsia  HYDROmorphone  Injectable 0.5 milliGRAM(s) IV Push every 4 hours PRN Severe Pain (7 - 10)  melatonin 3 milliGRAM(s) Oral at bedtime PRN Insomnia  ondansetron Injectable 4 milliGRAM(s) IV Push every 8 hours PRN Nausea and/or Vomiting  oxyCODONE    IR 5 milliGRAM(s) Oral every 6 hours PRN Moderate Pain (4 - 6)      ALLERGIES:  Allergies    No Known Drug Allergies  [place] (Rash)    Intolerances        LABS:                        9.8    11.44 )-----------( 464      ( 16 Oct 2023 06:10 )             31.4     10-16    134<L>  |  99  |  38<H>  ----------------------------<  255<H>  3.6   |  23  |  8.10<H>    Ca    8.6      16 Oct 2023 06:10  Phos  4.4     10-16  Mg     2.5     10-16    TPro  7.2  /  Alb  1.9<L>  /  TBili  0.5  /  DBili  x   /  AST  11<L>  /  ALT  13  /  AlkPhos  134<H>  10-15      Urinalysis Basic - ( 16 Oct 2023 06:10 )    Color: x / Appearance: x / SG: x / pH: x  Gluc: 255 mg/dL / Ketone: x  / Bili: x / Urobili: x   Blood: x / Protein: x / Nitrite: x   Leuk Esterase: x / RBC: x / WBC x   Sq Epi: x / Non Sq Epi: x / Bacteria: x        RADIOLOGY & ADDITIONAL TESTS: None in 24 hours.    Lines/Tubes: R brachial (HD), PIVs (R EJ x1)      GLOBAL ISSUE/BEST PRACTICE  Analgesia: Y  Sedation: Y  HOB elevation: yes  Stress ulcer prophylaxis: Y  VTE prophylaxis: Y  Glycemic control: Y  Nutrition: Y    CODE STATUS: Full Code INTERVAL HPI/OVERNIGHT EVENTS:  - Bowel reg D/C'd  - BCx 10/10 NGF  - Insulin down to 13u qhs and 5u premeal    SUBJECTIVE: Patient seen and examined at bedside.     ROS:  Constitutional: no fever, chills, fatigue  Neuro: no headache, numbness, weakness, dizziness  Resp: no cough, wheezing, shortness of breath  CVS: no chest pain, palpitations, leg swelling  GI: no abdominal pain, nausea, vomiting, diarrhea   : no dysuria, frequency, incontinence  Skin: no itching, burning, rashes, or lesions   Msk: +LLE pain    OBJECTIVE:    VITAL SIGNS:  ICU Vital Signs Last 24 Hrs  T(C): 36.7 (16 Oct 2023 05:01), Max: 36.9 (15 Oct 2023 07:54)  T(F): 98 (16 Oct 2023 05:01), Max: 98.5 (15 Oct 2023 07:54)  HR: 55 (16 Oct 2023 07:00) (55 - 71)  BP: 102/50 (16 Oct 2023 07:00) (93/48 - 165/75)  BP(mean): 72 (16 Oct 2023 07:00) (57 - 108)  ABP: --  ABP(mean): --  RR: 15 (16 Oct 2023 07:00) (3 - 27)  SpO2: 100% (16 Oct 2023 07:00) (98% - 100%)          10-15 @ 07:01  -  10-16 @ 07:00  --------------------------------------------------------  IN: 980 mL / OUT: 0 mL / NET: 980 mL      CAPILLARY BLOOD GLUCOSE      POCT Blood Glucose.: 262 mg/dL (15 Oct 2023 21:44)      PHYSICAL EXAM:  General: NAD, comfortable  HEENT: NCAT, clear conjunctiva, no scleral icterus  Respiratory: CTA b/l, no wheezing, rhonchi, rales  Cardiovascular: RRR, normal S1S2, no M/R/G  Abdomen: soft, NT/ND, bowel sounds present  Extremities: LLE dressings C/D/I, +mild TTP, well-healed s/p R BKA and R hand 3rd digit amputation changes  Neurology: awake and alert    MEDICATIONS:  MEDICATIONS  (STANDING):  atorvastatin 40 milliGRAM(s) Oral at bedtime  dextrose 5%. 1000 milliLiter(s) (50 mL/Hr) IV Continuous <Continuous>  dextrose 5%. 1000 milliLiter(s) (100 mL/Hr) IV Continuous <Continuous>  dextrose 50% Injectable 25 Gram(s) IV Push once  dextrose 50% Injectable 25 Gram(s) IV Push once  dextrose 50% Injectable 12.5 Gram(s) IV Push once  dorzolamide 2%/timolol 0.5% Ophthalmic Solution 1 Drop(s) Both EYES two times a day  glucagon  Injectable 1 milliGRAM(s) IntraMuscular once  heparin   Injectable 5000 Unit(s) SubCutaneous every 12 hours  insulin glargine Injectable (LANTUS) 13 Unit(s) SubCutaneous at bedtime  insulin lispro (ADMELOG) corrective regimen sliding scale   SubCutaneous Before meals and at bedtime  insulin lispro Injectable (ADMELOG) 5 Unit(s) SubCutaneous three times a day before meals  lactobacillus acidophilus 1 Tablet(s) Oral every 12 hours  midodrine. 20 milliGRAM(s) Oral three times a day  mupirocin 2% Nasal 1 Application(s) Both Nostrils two times a day  piperacillin/tazobactam IVPB.. 3.375 Gram(s) IV Intermittent every 12 hours    MEDICATIONS  (PRN):  acetaminophen     Tablet .. 650 milliGRAM(s) Oral every 4 hours PRN Temp greater or equal to 38C (100.4F), Mild Pain (1 - 3)  aluminum hydroxide/magnesium hydroxide/simethicone Suspension 30 milliLiter(s) Oral every 4 hours PRN Dyspepsia  HYDROmorphone  Injectable 0.5 milliGRAM(s) IV Push every 4 hours PRN Severe Pain (7 - 10)  melatonin 3 milliGRAM(s) Oral at bedtime PRN Insomnia  ondansetron Injectable 4 milliGRAM(s) IV Push every 8 hours PRN Nausea and/or Vomiting  oxyCODONE    IR 5 milliGRAM(s) Oral every 6 hours PRN Moderate Pain (4 - 6)      ALLERGIES:  Allergies    No Known Drug Allergies  [place] (Rash)    Intolerances        LABS:                        9.8    11.44 )-----------( 464      ( 16 Oct 2023 06:10 )             31.4     10-16    134<L>  |  99  |  38<H>  ----------------------------<  255<H>  3.6   |  23  |  8.10<H>    Ca    8.6      16 Oct 2023 06:10  Phos  4.4     10-16  Mg     2.5     10-16    TPro  7.2  /  Alb  1.9<L>  /  TBili  0.5  /  DBili  x   /  AST  11<L>  /  ALT  13  /  AlkPhos  134<H>  10-15      Urinalysis Basic - ( 16 Oct 2023 06:10 )    Color: x / Appearance: x / SG: x / pH: x  Gluc: 255 mg/dL / Ketone: x  / Bili: x / Urobili: x   Blood: x / Protein: x / Nitrite: x   Leuk Esterase: x / RBC: x / WBC x   Sq Epi: x / Non Sq Epi: x / Bacteria: x        RADIOLOGY & ADDITIONAL TESTS: None in 24 hours.    Lines/Tubes: R brachial (HD), PIVs (R EJ x1)      GLOBAL ISSUE/BEST PRACTICE  Analgesia: Y  Sedation: Y  HOB elevation: yes  Stress ulcer prophylaxis: Y  VTE prophylaxis: Y  Glycemic control: Y  Nutrition: Y    CODE STATUS: Full Code

## 2023-10-16 NOTE — PROGRESS NOTE ADULT - ASSESSMENT
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 Sin/Dr. Singh) Presents to ED [facility] 10/10 with weakness for 2 weeks. 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 Wound care consult and ID consults are requested .

## 2023-10-16 NOTE — PROGRESS NOTE ADULT - ASSESSMENT
49 yo male with multiple comorbidities and bilateral BKA was consulted for necrotizing fascitis    Taken to OR for urgent left knee disarticulation for gas gangrne  POD #5  Hypotension requiring vasopressors; now off    Downtrending WBC  Cont abx as per ID  Daily dressing changes by vascular team  RTOR Thursday 10/19 for AKA closure

## 2023-10-16 NOTE — PROGRESS NOTE ADULT - SUBJECTIVE AND OBJECTIVE BOX
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 Sin/Dr. Singh) Presents to ED [facility] 10/10 with weakness for 2 weeks. 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 Wound care consult and ID consults are requested . (10 Oct 2023 12:52)    PAST MEDICAL & SURGICAL HISTORY:  ESRD on dialysis      H/O hypotension      Diabetes mellitus      Leg wound, left      Steal syndrome dialysis vascular access      Gangrene of finger of right hand      PVD (peripheral vascular disease)      Anemia of chronic disease      Constipation      HLD (hyperlipidemia)      S/P BKA (below knee amputation) bilateral      AV fistula          MEDICATIONS  (STANDING):  atorvastatin 40 milliGRAM(s) Oral at bedtime  dextrose 5%. 1000 milliLiter(s) (50 mL/Hr) IV Continuous <Continuous>  dextrose 5%. 1000 milliLiter(s) (100 mL/Hr) IV Continuous <Continuous>  dextrose 50% Injectable 12.5 Gram(s) IV Push once  dextrose 50% Injectable 25 Gram(s) IV Push once  dextrose 50% Injectable 25 Gram(s) IV Push once  dorzolamide 2%/timolol 0.5% Ophthalmic Solution 1 Drop(s) Both EYES two times a day  glucagon  Injectable 1 milliGRAM(s) IntraMuscular once  heparin   Injectable 5000 Unit(s) SubCutaneous every 12 hours  insulin glargine Injectable (LANTUS) 13 Unit(s) SubCutaneous at bedtime  insulin lispro (ADMELOG) corrective regimen sliding scale   SubCutaneous Before meals and at bedtime  insulin lispro Injectable (ADMELOG) 5 Unit(s) SubCutaneous three times a day before meals  lactobacillus acidophilus 1 Tablet(s) Oral every 12 hours  midodrine. 20 milliGRAM(s) Oral three times a day  mupirocin 2% Nasal 1 Application(s) Both Nostrils two times a day  piperacillin/tazobactam IVPB.. 3.375 Gram(s) IV Intermittent every 12 hours    MEDICATIONS  (PRN):  acetaminophen     Tablet .. 650 milliGRAM(s) Oral every 4 hours PRN Temp greater or equal to 38C (100.4F), Mild Pain (1 - 3)  aluminum hydroxide/magnesium hydroxide/simethicone Suspension 30 milliLiter(s) Oral every 4 hours PRN Dyspepsia  HYDROmorphone  Injectable 0.5 milliGRAM(s) IV Push every 4 hours PRN Severe Pain (7 - 10)  melatonin 3 milliGRAM(s) Oral at bedtime PRN Insomnia  ondansetron Injectable 4 milliGRAM(s) IV Push every 8 hours PRN Nausea and/or Vomiting  oxyCODONE    IR 5 milliGRAM(s) Oral every 6 hours PRN Moderate Pain (4 - 6)      OBJECTIVE    T(C): 36.6 (10-16-23 @ 11:55), Max: 36.7 (10-16-23 @ 05:01)  HR: 63 (10-16-23 @ 11:55) (54 - 71)  BP: 91/42 (10-16-23 @ 11:55) (91/42 - 165/75)  RR: 15 (10-16-23 @ 11:55) (3 - 22)  SpO2: 100% (10-16-23 @ 11:55) (98% - 100%)  Wt(kg): --  I&O's Summary    15 Oct 2023 07:01  -  16 Oct 2023 07:00  --------------------------------------------------------  IN: 980 mL / OUT: 0 mL / NET: 980 mL    16 Oct 2023 07:01  -  16 Oct 2023 12:21  --------------------------------------------------------  IN: 250 mL / OUT: 0 mL / NET: 250 mL          REVIEW OF SYSTEMS:  CONSTITUTIONAL: No fever, weight loss, or fatigue  EYES: No eye pain, visual disturbances, or discharge  ENMT:   No sinus or throat pain  NECK: No pain or stiffness  RESPIRATORY: No cough, wheezing, chills or hemoptysis; No shortness of breath  CARDIOVASCULAR: No chest pain, palpitations, dizziness, or leg swelling  GASTROINTESTINAL: No abdominal pain. No nausea, vomiting; No diarrhea or constipation. No melena or hematochezia.  GENITOURINARY: No dysuria, frequency, hematuria, or incontinence  NEUROLOGICAL: No headaches, memory loss, loss of strength, numbness, or tremors  SKIN: No itching, burning, rashes, or lesions   MUSCULOSKELETAL: No joint pain or swelling; No muscle, back, or extremity pain    PHYSICAL EXAM:  Appearance: NAD. VS past 24 hrs -as above   HEENT:   Moist oral mucosa. Conjunctiva clear b/l.   Neck : supple  Respiratory: Lungs CTAB.  Gastrointestinal:  Soft, nontender. No rebound. No rigidity. BS present	  Cardiovascular: RRR ,S1S2 present  Neurologic: Non-focal. Moving all extremities.  Extremities: No edema. No erythema. No calf tenderness.  Skin: No rashes, No ecchymoses, No cyanosis.	  wounds ,skin lesions-See skin assesment flow sheet   LABS:                        9.8    11.44 )-----------( 464      ( 16 Oct 2023 06:10 )             31.4     10-16    134<L>  |  99  |  38<H>  ----------------------------<  255<H>  3.6   |  23  |  8.10<H>    Ca    8.6      16 Oct 2023 06:10  Phos  4.4     10-16  Mg     2.5     10-16    TPro  7.2  /  Alb  1.9<L>  /  TBili  0.5  /  DBili  x   /  AST  11<L>  /  ALT  13  /  AlkPhos  134<H>  10-15    CAPILLARY BLOOD GLUCOSE      POCT Blood Glucose.: 124 mg/dL (16 Oct 2023 11:38)  POCT Blood Glucose.: 274 mg/dL (16 Oct 2023 07:43)  POCT Blood Glucose.: 262 mg/dL (15 Oct 2023 21:44)  POCT Blood Glucose.: 162 mg/dL (15 Oct 2023 16:53)      Urinalysis Basic - ( 16 Oct 2023 06:10 )    Color: x / Appearance: x / SG: x / pH: x  Gluc: 255 mg/dL / Ketone: x  / Bili: x / Urobili: x   Blood: x / Protein: x / Nitrite: x   Leuk Esterase: x / RBC: x / WBC x   Sq Epi: x / Non Sq Epi: x / Bacteria: x        Culture - Surgical Swab (collected 11 Oct 2023 13:51)  Source: .Surgical Swab Surgical Swab  Final Report (14 Oct 2023 15:54):    Moderate Escherichia coli    Few Streptococcus agalactiae (Group B) Streptococcus agalactiae (Group B)    isolated    Group B streptococci are susceptible to ampicillin,    penicillin and cefazolin, but may be resistant to    erythromycin and clindamycin.    Rare Streptococcus mitis/oralis group "Susceptibilities not performed"    Numerous Actinomyces odontolyticus "Susceptibilities not performed"    Few Streptococcus agalactiae (Group B) isolated    Group B streptococci are susceptible to ampicillin,    penicillin and cefazolin, but may be resistant to    erythromycin and clindamycin.  Organism: Escherichia coli (14 Oct 2023 15:54)  Organism: Escherichia coli (14 Oct 2023 15:54)    Culture - Blood (collected 10 Oct 2023 12:00)  Source: .Blood Blood-Peripheral  Final Report (15 Oct 2023 19:00):    No growth at 5 days    Culture - Blood (collected 10 Oct 2023 11:15)  Source: .Blood Blood-Peripheral  Final Report (15 Oct 2023 16:00):    No growth at 5 days    Culture - Other (collected 10 Oct 2023 10:30)  Source: Skin Skin  Final Report (13 Oct 2023 15:09):    Culture yields growth of greater than 3 colony types of    bacteria,  which may indicate contamination and normal yoana    Call client services within 7 days if further workup is clinically    indicated. Culture includes    Numerous Streptococcus agalactiae (Group B) isolated    Group B streptococci are susceptible to ampicillin,    penicillin and cefazolin, but may be resistant to    erythromycin and clindamycin.    Numerous Escherichia coli  Organism: Escherichia coli (13 Oct 2023 15:09)  Organism: Escherichia coli (13 Oct 2023 15:09)      RADIOLOGY & ADDITIONAL TESTS:   reviewed elctronically  ASSESSMENT/PLAN: 	     PROGRESS NOTE  Patient is a 48y old  Male who presents with a chief complaint of weakness (16 Oct 2023 09:17)    Chart and available morning labs /imaging are reviewed electronically , urgent issues addressed . More information  is being added upon completion of rounds , when more information is collected and management discussed with consultants , medical staff and social service/case management on the floor   OVERNIGHT  No new issues reported by medical staff . All above noted Patient is resting in a bed comfortably  .No distress noted   INTERVAL HPI/OVERNIGHT EVENTS:  - Bowel reg D/C'd  - BCx 10/10 NGF  - Insulin down to 13u qhs and 5u premealINTERVAL HPI/OVERNIGHT EVENTS:  - Bowel reg D/C'd  - BCx 10/10 NGF  - Insulin down to 13u qhs and 5u premeal  HPI:  49 yo malewith PMH of DM2, b/l BKA, ESRD (on HD MWF) ,infection  right third finger 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 Sin/Dr. Singh) Presents to ED [facility] 10/10 with weakness for 2 weeks. 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 Wound care consult and ID consults are requested . (10 Oct 2023 12:52)    PAST MEDICAL & SURGICAL HISTORY:  ESRD on dialysis      H/O hypotension      Diabetes mellitus      Leg wound, left      Steal syndrome dialysis vascular access      Gangrene of finger of right hand      PVD (peripheral vascular disease)      Anemia of chronic disease      Constipation      HLD (hyperlipidemia)      S/P BKA (below knee amputation) bilateral      AV fistula          MEDICATIONS  (STANDING):  atorvastatin 40 milliGRAM(s) Oral at bedtime  dextrose 5%. 1000 milliLiter(s) (50 mL/Hr) IV Continuous <Continuous>  dextrose 5%. 1000 milliLiter(s) (100 mL/Hr) IV Continuous <Continuous>  dextrose 50% Injectable 12.5 Gram(s) IV Push once  dextrose 50% Injectable 25 Gram(s) IV Push once  dextrose 50% Injectable 25 Gram(s) IV Push once  dorzolamide 2%/timolol 0.5% Ophthalmic Solution 1 Drop(s) Both EYES two times a day  glucagon  Injectable 1 milliGRAM(s) IntraMuscular once  heparin   Injectable 5000 Unit(s) SubCutaneous every 12 hours  insulin glargine Injectable (LANTUS) 13 Unit(s) SubCutaneous at bedtime  insulin lispro (ADMELOG) corrective regimen sliding scale   SubCutaneous Before meals and at bedtime  insulin lispro Injectable (ADMELOG) 5 Unit(s) SubCutaneous three times a day before meals  lactobacillus acidophilus 1 Tablet(s) Oral every 12 hours  midodrine. 20 milliGRAM(s) Oral three times a day  mupirocin 2% Nasal 1 Application(s) Both Nostrils two times a day  piperacillin/tazobactam IVPB.. 3.375 Gram(s) IV Intermittent every 12 hours    MEDICATIONS  (PRN):  acetaminophen     Tablet .. 650 milliGRAM(s) Oral every 4 hours PRN Temp greater or equal to 38C (100.4F), Mild Pain (1 - 3)  aluminum hydroxide/magnesium hydroxide/simethicone Suspension 30 milliLiter(s) Oral every 4 hours PRN Dyspepsia  HYDROmorphone  Injectable 0.5 milliGRAM(s) IV Push every 4 hours PRN Severe Pain (7 - 10)  melatonin 3 milliGRAM(s) Oral at bedtime PRN Insomnia  ondansetron Injectable 4 milliGRAM(s) IV Push every 8 hours PRN Nausea and/or Vomiting  oxyCODONE    IR 5 milliGRAM(s) Oral every 6 hours PRN Moderate Pain (4 - 6)      OBJECTIVE    T(C): 36.6 (10-16-23 @ 11:55), Max: 36.7 (10-16-23 @ 05:01)  HR: 63 (10-16-23 @ 11:55) (54 - 71)  BP: 91/42 (10-16-23 @ 11:55) (91/42 - 165/75)  RR: 15 (10-16-23 @ 11:55) (3 - 22)  SpO2: 100% (10-16-23 @ 11:55) (98% - 100%)  Wt(kg): --  I&O's Summary    15 Oct 2023 07:01  -  16 Oct 2023 07:00  --------------------------------------------------------  IN: 980 mL / OUT: 0 mL / NET: 980 mL    16 Oct 2023 07:01  -  16 Oct 2023 12:21  --------------------------------------------------------  IN: 250 mL / OUT: 0 mL / NET: 250 mL          REVIEW OF SYSTEMS:  CONSTITUTIONAL: No fever, weight loss, or fatigue  EYES: No eye pain, visual disturbances, or discharge  ENMT:   No sinus or throat pain  NECK: No pain or stiffness  RESPIRATORY: No cough, wheezing, chills or hemoptysis; No shortness of breath  CARDIOVASCULAR: No chest pain, palpitations, dizziness, or leg swelling  GASTROINTESTINAL: No abdominal pain. No nausea, vomiting; No diarrhea or constipation. No melena or hematochezia.  GENITOURINARY: No dysuria, frequency, hematuria, or incontinence  NEUROLOGICAL: No headaches, memory loss, loss of strength, numbness, or tremors  SKIN: No itching, burning, rashes, or lesions   MUSCULOSKELETAL: No joint pain or swelling; No muscle, back, or extremity pain  s/p b/l bka  PHYSICAL EXAM:  Appearance: NAD. VS past 24 hrs -as above   HEENT:   Moist oral mucosa. Conjunctiva clear b/l.   Neck : supple  Respiratory: Lungs CTAB.  Gastrointestinal:  Soft, nontender. No rebound. No rigidity. BS present	  Cardiovascular: RRR ,S1S2 present  Neurologic: Non-focal. Moving all extremities.  Extremities: dressing intact lle  Skin: No rashes, No ecchymoses, No cyanosis.	  wounds ,skin lesions-See skin assesment flow sheet   LABS:                        9.8    11.44 )-----------( 464      ( 16 Oct 2023 06:10 )             31.4     10-16    134<L>  |  99  |  38<H>  ----------------------------<  255<H>  3.6   |  23  |  8.10<H>    Ca    8.6      16 Oct 2023 06:10  Phos  4.4     10-16  Mg     2.5     10-16    TPro  7.2  /  Alb  1.9<L>  /  TBili  0.5  /  DBili  x   /  AST  11<L>  /  ALT  13  /  AlkPhos  134<H>  10-15    CAPILLARY BLOOD GLUCOSE      POCT Blood Glucose.: 124 mg/dL (16 Oct 2023 11:38)  POCT Blood Glucose.: 274 mg/dL (16 Oct 2023 07:43)  POCT Blood Glucose.: 262 mg/dL (15 Oct 2023 21:44)  POCT Blood Glucose.: 162 mg/dL (15 Oct 2023 16:53)      Urinalysis Basic - ( 16 Oct 2023 06:10 )    Color: x / Appearance: x / SG: x / pH: x  Gluc: 255 mg/dL / Ketone: x  / Bili: x / Urobili: x   Blood: x / Protein: x / Nitrite: x   Leuk Esterase: x / RBC: x / WBC x   Sq Epi: x / Non Sq Epi: x / Bacteria: x        Culture - Surgical Swab (collected 11 Oct 2023 13:51)  Source: .Surgical Swab Surgical Swab  Final Report (14 Oct 2023 15:54):    Moderate Escherichia coli    Few Streptococcus agalactiae (Group B) Streptococcus agalactiae (Group B)    isolated    Group B streptococci are susceptible to ampicillin,    penicillin and cefazolin, but may be resistant to    erythromycin and clindamycin.    Rare Streptococcus mitis/oralis group "Susceptibilities not performed"    Numerous Actinomyces odontolyticus "Susceptibilities not performed"    Few Streptococcus agalactiae (Group B) isolated    Group B streptococci are susceptible to ampicillin,    penicillin and cefazolin, but may be resistant to    erythromycin and clindamycin.  Organism: Escherichia coli (14 Oct 2023 15:54)  Organism: Escherichia coli (14 Oct 2023 15:54)    Culture - Blood (collected 10 Oct 2023 12:00)  Source: .Blood Blood-Peripheral  Final Report (15 Oct 2023 19:00):    No growth at 5 days    Culture - Blood (collected 10 Oct 2023 11:15)  Source: .Blood Blood-Peripheral  Final Report (15 Oct 2023 16:00):    No growth at 5 days    Culture - Other (collected 10 Oct 2023 10:30)  Source: Skin Skin  Final Report (13 Oct 2023 15:09):    Culture yields growth of greater than 3 colony types of    bacteria,  which may indicate contamination and normal yoana    Call client services within 7 days if further workup is clinically    indicated. Culture includes    Numerous Streptococcus agalactiae (Group B) isolated    Group B streptococci are susceptible to ampicillin,    penicillin and cefazolin, but may be resistant to    erythromycin and clindamycin.    Numerous Escherichia coli  Organism: Escherichia coli (13 Oct 2023 15:09)  Organism: Escherichia coli (13 Oct 2023 15:09)      RADIOLOGY & ADDITIONAL TESTS:   reviewed elctronically  ASSESSMENT/PLAN: 	    25 minutes aggregate time was spent on this visit, 50% visit time spent in care co-ordination with other attendings and counselling patient .I have discussed care plan with patient / HCP/family member ,who expressed understanding of problems treatment and their effect and side effects, alternatives in details. I have asked if they have any questions and concerns and appropriately addressed them to best of my ability.

## 2023-10-16 NOTE — PROGRESS NOTE ADULT - ASSESSMENT
24 Hour:  - Insulin down to 13u qhs and 5u premeal    48 yr old male with PMHx of DM2, ESRD on H.D. 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 1/2023 requiring multiple irrigation and debridements (last 3/2023) wound vac therapy with eventual Rt 3rd digit of Rt hand amputation (approx 2/2023) p/w worsening LLE wound/purulent drainage i/s/o hypoTN, leukocytosis, fever c/f sepsis now s/p Zosyn course and emergent L AKA 10/11 c/b refractory hypoTN requiring pressors    Neuro:  -Off sedation, melatonin for sleep  -Pain: Tylenol 650 q6, oxy prn    CV:  -A/w refractory hypoTN c/f septic shock, now improving  -MAPs >65, now off pressors  -Continue midodrine 20 TID  -Lactate neg    Pulm:  -SpO2 >92% RA  -IS postop    GI:  -Diet: renal restricted + CC diet w/ snack  -PPI and Milox  -Zofran PRN for n/v    Renal:  -ESRD w/ HD MWF through R brachial AVF; plan for HD M 10/16  -Serum lytes currently stable  -Nephrology following    Heme:  -H/h stable  -DVT ppx: SQH    ID:  -A/w septic shock and c/f LLE necrotizing fasciitis now s/p AKA, doing well clinically at the moment but will CTM closely  -Per VSx plan for OR "next week" for stump closure  -MRSA swab 10/11 positive  -WBC on admit 22k now down to 11k, currently afebrile  -IVABX per ID: Zosyn 10/12-10/18; s/p Vanco x3  -BCx 10/10 NGF, OR Cx: +GBS +EColi, Wound Cx: +GBS    Endo:  -Hx DM2, BGs well controlled  -Continue SSI, Lantus 15u qhs, 7u premeals    Lines/Tubes: R brachial (HD), PIVs (R EJ x1)    Dispo: ICU 48 yr old male with PMHx of DM2, ESRD on H.D. 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 1/2023 requiring multiple irrigation and debridements (last 3/2023) wound vac therapy with eventual Rt 3rd digit of Rt hand amputation (approx 2/2023) p/w worsening LLE wound/purulent drainage i/s/o hypoTN, leukocytosis, fever c/f sepsis now s/p Zosyn course and emergent L AKA 10/11 c/b refractory hypoTN requiring pressors    Neuro:  -Off sedation, melatonin for sleep  -Pain: Tylenol 650 q6, oxy prn    CV:  -A/w refractory hypoTN c/f septic shock, now improving  -MAPs >65, now off pressors  -Continue midodrine 20 TID  -Lactate neg    Pulm:  -SpO2 >92% RA  -IS postop    GI:  -Diet: renal restricted + CC diet w/ snack  -PPI and Milox  -Zofran PRN for n/v    Renal:  -ESRD w/ HD MWF through R brachial AVF; plan for HD M 10/16  -Serum lytes currently stable  -Nephrology following    Heme:  -H/h stable  -DVT ppx: SQH    ID:  -A/w septic shock and c/f LLE necrotizing fasciitis now s/p AKA, doing well clinically at the moment but will CTM closely  -Per VSx plan for OR "next week" for stump closure  -MRSA swab 10/11 positive  -WBC on admit 22k now down to 11k, currently afebrile  -IVABX per ID: Zosyn 10/12-10/18; s/p Vanco x3  -BCx 10/10 NGF, OR Cx: +GBS +EColi, Wound Cx: +GBS    Endo:  -Hx DM2, BGs 200s this AM  -Decreased lantus to 13u and 5u premeal, consider readjusting    Lines/Tubes: R brachial (HD), PIVs (R EJ x1)    Dispo: ICU

## 2023-10-16 NOTE — PROGRESS NOTE ADULT - SUBJECTIVE AND OBJECTIVE BOX
Patient is a 48y old  Male who presents with a chief complaint of weakness (16 Oct 2023 12:21)    Pt without complaints  No acute changes    Allergies  No Known Drug Allergies  [place] (Rash)      Vital Signs Last 24 Hrs  T(C): 36.6 (16 Oct 2023 11:55), Max: 36.7 (16 Oct 2023 05:01)  T(F): 97.9 (16 Oct 2023 11:55), Max: 98 (16 Oct 2023 05:01)  HR: 63 (16 Oct 2023 12:00) (54 - 71)  BP: 91/42 (16 Oct 2023 12:00) (91/42 - 165/75)  BP(mean): 61 (16 Oct 2023 12:00) (57 - 108)  RR: 13 (16 Oct 2023 12:00) (3 - 22)  SpO2: 100% (16 Oct 2023 12:00) (98% - 100%)    Parameters below as of 16 Oct 2023 11:55  Patient On (Oxygen Delivery Method): room air      I&O's Detail    15 Oct 2023 07:01  -  16 Oct 2023 07:00  --------------------------------------------------------  IN:    IV PiggyBack: 200 mL    Oral Fluid: 780 mL  Total IN: 980 mL    OUT:    Voided (mL): 0 mL  Total OUT: 0 mL    Total NET: 980 mL      16 Oct 2023 07:01  -  16 Oct 2023 13:41  --------------------------------------------------------  IN:    Oral Fluid: 250 mL  Total IN: 250 mL    OUT:    Other (mL): 0 mL  Total OUT: 0 mL    Total NET: 250 mL    Physical Exam:  General: NAD, resting comfortably in bed  Pulmonary: Nonlabored breathing, no respiratory distress, CTA  Cardiovascular: NSR  Abdominal: soft, NT/ND  Extremities: L stump dressing changed. Posterior thigh mildly tender; purulent drainage milked from posterior wound; no malodor; Dressing changed; betadine soaked kerlex ABD and ACE wrap applied. Pt tolerated well        LABS:                        9.8    11.44 )-----------( 464      ( 16 Oct 2023 06:10 )             31.4     10-16    134<L>  |  99  |  38<H>  ----------------------------<  255<H>  3.6   |  23  |  8.10<H>    Ca    8.6      16 Oct 2023 06:10  Phos  4.4     10-16  Mg     2.5     10-16    TPro  7.2  /  Alb  1.9<L>  /  TBili  0.5  /  DBili  x   /  AST  11<L>  /  ALT  13  /  AlkPhos  134<H>  10-15      CAPILLARY BLOOD GLUCOSE  POCT Blood Glucose.: 124 mg/dL (16 Oct 2023 11:38)  POCT Blood Glucose.: 274 mg/dL (16 Oct 2023 07:43)  POCT Blood Glucose.: 262 mg/dL (15 Oct 2023 21:44)  POCT Blood Glucose.: 162 mg/dL (15 Oct 2023 16:53)    Radiology and Additional Studies:

## 2023-10-16 NOTE — PROGRESS NOTE ADULT - ASSESSMENT
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 Sin/Dr. Singh) Presents to ED [facility] 10/10 with weakness for 2 weeks. 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 Wound care consult and ID consults are requested . (10 Oct 2023 12:52)      esrd on hd for mwf   Excess fluids and waste products will be removed from your blood; your electrolytes will be balanced; your blood pressure will be controlled.    Admit for septic workup and ID evaluation,send blood and urine cx,serial lactate levels,monitor vitals closley,ivfs hydration,monitor urine output and renal profile,iv abx as per id cons  vancomycin  IVPB 500 milliGRAM(s) IV Intermittent once    BP monitoring,continue current  meds, low salt diet,followup with PMD in 1-2 weeks  midodrine. 10 milliGRAM(s) Oral three times a day  norepinephrine Infusion 0.05 MICROgram(s)/kG/Min (6 mL/Hr) IV Continuous       ANEMIA PLAN:  Anemia of chronic disease:  We will continue epo aiming for a HCT of 32-36 %.   We will monitor Iron stores, B12 and RBC folate .    dvt heparin   Injectable 5000 Unit(s) SubCutaneous every 12 hours  Taken to OR for urgent left knee disarticulation for gas gangrne  POD#4

## 2023-10-16 NOTE — PROGRESS NOTE ADULT - SUBJECTIVE AND OBJECTIVE BOX
Date/Time Patient Seen:  		  Referring MD:   Data Reviewed	       Patient is a 48y old  Male who presents with a chief complaint of weakness (15 Oct 2023 22:48)      Subjective/HPI     PAST MEDICAL & SURGICAL HISTORY:  ESRD on dialysis    H/O hypotension    Diabetes mellitus    Leg wound, left    Steal syndrome dialysis vascular access    Gangrene of finger of right hand    PVD (peripheral vascular disease)    Anemia of chronic disease    Constipation    HLD (hyperlipidemia)    S/P BKA (below knee amputation) bilateral    AV fistula          Medication list         MEDICATIONS  (STANDING):  atorvastatin 40 milliGRAM(s) Oral at bedtime  dextrose 5%. 1000 milliLiter(s) (50 mL/Hr) IV Continuous <Continuous>  dextrose 5%. 1000 milliLiter(s) (100 mL/Hr) IV Continuous <Continuous>  dextrose 50% Injectable 25 Gram(s) IV Push once  dextrose 50% Injectable 12.5 Gram(s) IV Push once  dextrose 50% Injectable 25 Gram(s) IV Push once  dorzolamide 2%/timolol 0.5% Ophthalmic Solution 1 Drop(s) Both EYES two times a day  glucagon  Injectable 1 milliGRAM(s) IntraMuscular once  heparin   Injectable 5000 Unit(s) SubCutaneous every 12 hours  insulin glargine Injectable (LANTUS) 13 Unit(s) SubCutaneous at bedtime  insulin lispro (ADMELOG) corrective regimen sliding scale   SubCutaneous Before meals and at bedtime  insulin lispro Injectable (ADMELOG) 5 Unit(s) SubCutaneous three times a day before meals  lactobacillus acidophilus 1 Tablet(s) Oral every 12 hours  midodrine. 20 milliGRAM(s) Oral three times a day  mupirocin 2% Nasal 1 Application(s) Both Nostrils two times a day  piperacillin/tazobactam IVPB.. 3.375 Gram(s) IV Intermittent every 12 hours    MEDICATIONS  (PRN):  acetaminophen     Tablet .. 650 milliGRAM(s) Oral every 4 hours PRN Temp greater or equal to 38C (100.4F), Mild Pain (1 - 3)  aluminum hydroxide/magnesium hydroxide/simethicone Suspension 30 milliLiter(s) Oral every 4 hours PRN Dyspepsia  HYDROmorphone  Injectable 0.5 milliGRAM(s) IV Push every 4 hours PRN Severe Pain (7 - 10)  melatonin 3 milliGRAM(s) Oral at bedtime PRN Insomnia  ondansetron Injectable 4 milliGRAM(s) IV Push every 8 hours PRN Nausea and/or Vomiting  oxyCODONE    IR 5 milliGRAM(s) Oral every 6 hours PRN Moderate Pain (4 - 6)         Vitals log        ICU Vital Signs Last 24 Hrs  T(C): 36.3 (15 Oct 2023 23:47), Max: 36.9 (15 Oct 2023 07:54)  T(F): 97.4 (15 Oct 2023 23:47), Max: 98.5 (15 Oct 2023 07:54)  HR: 58 (16 Oct 2023 04:00) (57 - 69)  BP: 105/55 (16 Oct 2023 04:00) (98/55 - 165/75)  BP(mean): 67 (16 Oct 2023 04:00) (63 - 108)  ABP: --  ABP(mean): --  RR: 14 (16 Oct 2023 04:00) (3 - 27)  SpO2: 99% (16 Oct 2023 04:00) (97% - 100%)    O2 Parameters below as of 15 Oct 2023 06:00  Patient On (Oxygen Delivery Method): room air                 Input and Output:  I&O's Detail    14 Oct 2023 07:01  -  15 Oct 2023 07:00  --------------------------------------------------------  IN:    IV PiggyBack: 200 mL    Norepinephrine: 73.2 mL    Oral Fluid: 760 mL  Total IN: 1033.2 mL    OUT:    Other (mL): 0 mL    Voided (mL): 0 mL  Total OUT: 0 mL    Total NET: 1033.2 mL      15 Oct 2023 07:01  -  16 Oct 2023 05:31  --------------------------------------------------------  IN:    IV PiggyBack: 200 mL    Oral Fluid: 780 mL  Total IN: 980 mL    OUT:    Voided (mL): 0 mL  Total OUT: 0 mL    Total NET: 980 mL          Lab Data                        9.5    11.77 )-----------( 406      ( 15 Oct 2023 10:40 )             30.5     10-15    138  |  102  |  27<H>  ----------------------------<  97  3.8   |  29  |  6.50<H>    Ca    8.6      15 Oct 2023 10:40  Phos  3.1     10-15  Mg     2.4     10-15    TPro  7.2  /  Alb  1.9<L>  /  TBili  0.5  /  DBili  x   /  AST  11<L>  /  ALT  13  /  AlkPhos  134<H>  10-15            Review of Systems	      Objective     Physical Examination    heart s1s2  lung dc BS    Pertinent Lab findings & Imaging      Barak:  NO   Adequate UO     I&O's Detail    14 Oct 2023 07:01  -  15 Oct 2023 07:00  --------------------------------------------------------  IN:    IV PiggyBack: 200 mL    Norepinephrine: 73.2 mL    Oral Fluid: 760 mL  Total IN: 1033.2 mL    OUT:    Other (mL): 0 mL    Voided (mL): 0 mL  Total OUT: 0 mL    Total NET: 1033.2 mL      15 Oct 2023 07:01  -  16 Oct 2023 05:31  --------------------------------------------------------  IN:    IV PiggyBack: 200 mL    Oral Fluid: 780 mL  Total IN: 980 mL    OUT:    Voided (mL): 0 mL  Total OUT: 0 mL    Total NET: 980 mL               Discussed with:     Cultures:	        Radiology

## 2023-10-17 LAB
ANION GAP SERPL CALC-SCNC: 10 MMOL/L — SIGNIFICANT CHANGE UP (ref 5–17)
ANION GAP SERPL CALC-SCNC: 10 MMOL/L — SIGNIFICANT CHANGE UP (ref 5–17)
BASOPHILS # BLD AUTO: 0.1 K/UL — SIGNIFICANT CHANGE UP (ref 0–0.2)
BASOPHILS # BLD AUTO: 0.1 K/UL — SIGNIFICANT CHANGE UP (ref 0–0.2)
BASOPHILS NFR BLD AUTO: 0.9 % — SIGNIFICANT CHANGE UP (ref 0–2)
BASOPHILS NFR BLD AUTO: 0.9 % — SIGNIFICANT CHANGE UP (ref 0–2)
BUN SERPL-MCNC: 26 MG/DL — HIGH (ref 7–23)
BUN SERPL-MCNC: 26 MG/DL — HIGH (ref 7–23)
CALCIUM SERPL-MCNC: 8.4 MG/DL — LOW (ref 8.5–10.1)
CALCIUM SERPL-MCNC: 8.4 MG/DL — LOW (ref 8.5–10.1)
CHLORIDE SERPL-SCNC: 101 MMOL/L — SIGNIFICANT CHANGE UP (ref 96–108)
CHLORIDE SERPL-SCNC: 101 MMOL/L — SIGNIFICANT CHANGE UP (ref 96–108)
CO2 SERPL-SCNC: 26 MMOL/L — SIGNIFICANT CHANGE UP (ref 22–31)
CO2 SERPL-SCNC: 26 MMOL/L — SIGNIFICANT CHANGE UP (ref 22–31)
CREAT SERPL-MCNC: 6.4 MG/DL — HIGH (ref 0.5–1.3)
CREAT SERPL-MCNC: 6.4 MG/DL — HIGH (ref 0.5–1.3)
EGFR: 10 ML/MIN/1.73M2 — LOW
EGFR: 10 ML/MIN/1.73M2 — LOW
EOSINOPHIL # BLD AUTO: 0.6 K/UL — HIGH (ref 0–0.5)
EOSINOPHIL # BLD AUTO: 0.6 K/UL — HIGH (ref 0–0.5)
EOSINOPHIL NFR BLD AUTO: 5.4 % — SIGNIFICANT CHANGE UP (ref 0–6)
EOSINOPHIL NFR BLD AUTO: 5.4 % — SIGNIFICANT CHANGE UP (ref 0–6)
GLUCOSE SERPL-MCNC: 261 MG/DL — HIGH (ref 70–99)
GLUCOSE SERPL-MCNC: 261 MG/DL — HIGH (ref 70–99)
HCT VFR BLD CALC: 30.7 % — LOW (ref 39–50)
HCT VFR BLD CALC: 30.7 % — LOW (ref 39–50)
HGB BLD-MCNC: 9.9 G/DL — LOW (ref 13–17)
HGB BLD-MCNC: 9.9 G/DL — LOW (ref 13–17)
IMM GRANULOCYTES NFR BLD AUTO: 3.3 % — HIGH (ref 0–0.9)
IMM GRANULOCYTES NFR BLD AUTO: 3.3 % — HIGH (ref 0–0.9)
LYMPHOCYTES # BLD AUTO: 2.28 K/UL — SIGNIFICANT CHANGE UP (ref 1–3.3)
LYMPHOCYTES # BLD AUTO: 2.28 K/UL — SIGNIFICANT CHANGE UP (ref 1–3.3)
LYMPHOCYTES # BLD AUTO: 20.4 % — SIGNIFICANT CHANGE UP (ref 13–44)
LYMPHOCYTES # BLD AUTO: 20.4 % — SIGNIFICANT CHANGE UP (ref 13–44)
MAGNESIUM SERPL-MCNC: 2.4 MG/DL — SIGNIFICANT CHANGE UP (ref 1.6–2.6)
MAGNESIUM SERPL-MCNC: 2.4 MG/DL — SIGNIFICANT CHANGE UP (ref 1.6–2.6)
MCHC RBC-ENTMCNC: 30.1 PG — SIGNIFICANT CHANGE UP (ref 27–34)
MCHC RBC-ENTMCNC: 30.1 PG — SIGNIFICANT CHANGE UP (ref 27–34)
MCHC RBC-ENTMCNC: 32.2 GM/DL — SIGNIFICANT CHANGE UP (ref 32–36)
MCHC RBC-ENTMCNC: 32.2 GM/DL — SIGNIFICANT CHANGE UP (ref 32–36)
MCV RBC AUTO: 93.3 FL — SIGNIFICANT CHANGE UP (ref 80–100)
MCV RBC AUTO: 93.3 FL — SIGNIFICANT CHANGE UP (ref 80–100)
MONOCYTES # BLD AUTO: 0.99 K/UL — HIGH (ref 0–0.9)
MONOCYTES # BLD AUTO: 0.99 K/UL — HIGH (ref 0–0.9)
MONOCYTES NFR BLD AUTO: 8.8 % — SIGNIFICANT CHANGE UP (ref 2–14)
MONOCYTES NFR BLD AUTO: 8.8 % — SIGNIFICANT CHANGE UP (ref 2–14)
NEUTROPHILS # BLD AUTO: 6.86 K/UL — SIGNIFICANT CHANGE UP (ref 1.8–7.4)
NEUTROPHILS # BLD AUTO: 6.86 K/UL — SIGNIFICANT CHANGE UP (ref 1.8–7.4)
NEUTROPHILS NFR BLD AUTO: 61.2 % — SIGNIFICANT CHANGE UP (ref 43–77)
NEUTROPHILS NFR BLD AUTO: 61.2 % — SIGNIFICANT CHANGE UP (ref 43–77)
NRBC # BLD: 0 /100 WBCS — SIGNIFICANT CHANGE UP (ref 0–0)
NRBC # BLD: 0 /100 WBCS — SIGNIFICANT CHANGE UP (ref 0–0)
PHOSPHATE SERPL-MCNC: 4.2 MG/DL — SIGNIFICANT CHANGE UP (ref 2.5–4.5)
PHOSPHATE SERPL-MCNC: 4.2 MG/DL — SIGNIFICANT CHANGE UP (ref 2.5–4.5)
PLATELET # BLD AUTO: 472 K/UL — HIGH (ref 150–400)
PLATELET # BLD AUTO: 472 K/UL — HIGH (ref 150–400)
POTASSIUM SERPL-MCNC: 4 MMOL/L — SIGNIFICANT CHANGE UP (ref 3.5–5.3)
POTASSIUM SERPL-MCNC: 4 MMOL/L — SIGNIFICANT CHANGE UP (ref 3.5–5.3)
POTASSIUM SERPL-SCNC: 4 MMOL/L — SIGNIFICANT CHANGE UP (ref 3.5–5.3)
POTASSIUM SERPL-SCNC: 4 MMOL/L — SIGNIFICANT CHANGE UP (ref 3.5–5.3)
RBC # BLD: 3.29 M/UL — LOW (ref 4.2–5.8)
RBC # BLD: 3.29 M/UL — LOW (ref 4.2–5.8)
RBC # FLD: 14.7 % — HIGH (ref 10.3–14.5)
RBC # FLD: 14.7 % — HIGH (ref 10.3–14.5)
SODIUM SERPL-SCNC: 137 MMOL/L — SIGNIFICANT CHANGE UP (ref 135–145)
SODIUM SERPL-SCNC: 137 MMOL/L — SIGNIFICANT CHANGE UP (ref 135–145)
WBC # BLD: 11.2 K/UL — HIGH (ref 3.8–10.5)
WBC # BLD: 11.2 K/UL — HIGH (ref 3.8–10.5)
WBC # FLD AUTO: 11.2 K/UL — HIGH (ref 3.8–10.5)
WBC # FLD AUTO: 11.2 K/UL — HIGH (ref 3.8–10.5)

## 2023-10-17 PROCEDURE — 99233 SBSQ HOSP IP/OBS HIGH 50: CPT | Mod: GC

## 2023-10-17 RX ORDER — PIPERACILLIN AND TAZOBACTAM 4; .5 G/20ML; G/20ML
3.38 INJECTION, POWDER, LYOPHILIZED, FOR SOLUTION INTRAVENOUS EVERY 12 HOURS
Refills: 0 | Status: DISCONTINUED | OUTPATIENT
Start: 2023-10-17 | End: 2023-10-18

## 2023-10-17 RX ORDER — OXYCODONE HYDROCHLORIDE 5 MG/1
10 TABLET ORAL EVERY 6 HOURS
Refills: 0 | Status: DISCONTINUED | OUTPATIENT
Start: 2023-10-17 | End: 2023-10-19

## 2023-10-17 RX ADMIN — OXYCODONE HYDROCHLORIDE 5 MILLIGRAM(S): 5 TABLET ORAL at 05:38

## 2023-10-17 RX ADMIN — HEPARIN SODIUM 5000 UNIT(S): 5000 INJECTION INTRAVENOUS; SUBCUTANEOUS at 17:01

## 2023-10-17 RX ADMIN — INSULIN GLARGINE 13 UNIT(S): 100 INJECTION, SOLUTION SUBCUTANEOUS at 21:56

## 2023-10-17 RX ADMIN — OXYCODONE HYDROCHLORIDE 5 MILLIGRAM(S): 5 TABLET ORAL at 06:15

## 2023-10-17 RX ADMIN — Medication 1 TABLET(S): at 17:01

## 2023-10-17 RX ADMIN — Medication 1: at 11:06

## 2023-10-17 RX ADMIN — HYDROMORPHONE HYDROCHLORIDE 0.5 MILLIGRAM(S): 2 INJECTION INTRAMUSCULAR; INTRAVENOUS; SUBCUTANEOUS at 12:30

## 2023-10-17 RX ADMIN — Medication 5 UNIT(S): at 16:59

## 2023-10-17 RX ADMIN — MIDODRINE HYDROCHLORIDE 20 MILLIGRAM(S): 2.5 TABLET ORAL at 05:39

## 2023-10-17 RX ADMIN — ATORVASTATIN CALCIUM 40 MILLIGRAM(S): 80 TABLET, FILM COATED ORAL at 21:56

## 2023-10-17 RX ADMIN — OXYCODONE HYDROCHLORIDE 10 MILLIGRAM(S): 5 TABLET ORAL at 19:00

## 2023-10-17 RX ADMIN — DORZOLAMIDE HYDROCHLORIDE TIMOLOL MALEATE 1 DROP(S): 20; 5 SOLUTION/ DROPS OPHTHALMIC at 05:39

## 2023-10-17 RX ADMIN — PIPERACILLIN AND TAZOBACTAM 25 GRAM(S): 4; .5 INJECTION, POWDER, LYOPHILIZED, FOR SOLUTION INTRAVENOUS at 21:56

## 2023-10-17 RX ADMIN — OXYCODONE HYDROCHLORIDE 10 MILLIGRAM(S): 5 TABLET ORAL at 19:30

## 2023-10-17 RX ADMIN — HEPARIN SODIUM 5000 UNIT(S): 5000 INJECTION INTRAVENOUS; SUBCUTANEOUS at 05:38

## 2023-10-17 RX ADMIN — MUPIROCIN 1 APPLICATION(S): 20 OINTMENT TOPICAL at 17:01

## 2023-10-17 RX ADMIN — Medication 5 UNIT(S): at 11:07

## 2023-10-17 RX ADMIN — MIDODRINE HYDROCHLORIDE 20 MILLIGRAM(S): 2.5 TABLET ORAL at 16:59

## 2023-10-17 RX ADMIN — Medication 3: at 07:39

## 2023-10-17 RX ADMIN — DORZOLAMIDE HYDROCHLORIDE TIMOLOL MALEATE 1 DROP(S): 20; 5 SOLUTION/ DROPS OPHTHALMIC at 17:00

## 2023-10-17 RX ADMIN — Medication 1: at 16:58

## 2023-10-17 RX ADMIN — MIDODRINE HYDROCHLORIDE 20 MILLIGRAM(S): 2.5 TABLET ORAL at 11:07

## 2023-10-17 RX ADMIN — Medication 1: at 21:57

## 2023-10-17 RX ADMIN — PIPERACILLIN AND TAZOBACTAM 25 GRAM(S): 4; .5 INJECTION, POWDER, LYOPHILIZED, FOR SOLUTION INTRAVENOUS at 11:03

## 2023-10-17 RX ADMIN — HYDROMORPHONE HYDROCHLORIDE 0.5 MILLIGRAM(S): 2 INJECTION INTRAMUSCULAR; INTRAVENOUS; SUBCUTANEOUS at 12:12

## 2023-10-17 RX ADMIN — Medication 5 UNIT(S): at 07:38

## 2023-10-17 NOTE — PROGRESS NOTE ADULT - ASSESSMENT
49 yo male with multiple comorbidities and bilateral BKA was consulted for necrotizing fascitis    Taken to OR for urgent left knee disarticulation for gas gangrne  POD #6  Hypotension requiring vasopressors; now off  Awaiting transfer to floor  Stable WBC  Cont abx as per ID  Daily dressing changes by vascular team  RTOR Thursday 10/19 for AKA closure  Medical clearance and cardiac risk assessment appreciated

## 2023-10-17 NOTE — PROGRESS NOTE ADULT - ASSESSMENT
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 Sin/Dr. Singh) Presents to ED [facility] 10/10 with weakness for 2 weeks. 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 Wound care consult and ID consults are requested .

## 2023-10-17 NOTE — PROGRESS NOTE ADULT - SUBJECTIVE AND OBJECTIVE BOX
PROGRESS NOTE  Patient is a 48y old  Male who presents with a chief complaint of weakness (17 Oct 2023 11:28)    Chart and available morning labs /imaging are reviewed electronically , urgent issues addressed . 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 Sin/Dr. Singh) Presents to ED [facility] 10/10 with weakness for 2 weeks. 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 Wound care consult and ID consults are requested . (10 Oct 2023 12:52)    PAST MEDICAL & SURGICAL HISTORY:  ESRD on dialysis      H/O hypotension      Diabetes mellitus      Leg wound, left      Steal syndrome dialysis vascular access      Gangrene of finger of right hand      PVD (peripheral vascular disease)      Anemia of chronic disease      Constipation      HLD (hyperlipidemia)      S/P BKA (below knee amputation) bilateral      AV fistula          MEDICATIONS  (STANDING):  atorvastatin 40 milliGRAM(s) Oral at bedtime  dextrose 5%. 1000 milliLiter(s) (50 mL/Hr) IV Continuous <Continuous>  dextrose 5%. 1000 milliLiter(s) (100 mL/Hr) IV Continuous <Continuous>  dextrose 50% Injectable 12.5 Gram(s) IV Push once  dextrose 50% Injectable 25 Gram(s) IV Push once  dextrose 50% Injectable 25 Gram(s) IV Push once  dorzolamide 2%/timolol 0.5% Ophthalmic Solution 1 Drop(s) Both EYES two times a day  glucagon  Injectable 1 milliGRAM(s) IntraMuscular once  heparin   Injectable 5000 Unit(s) SubCutaneous every 12 hours  insulin glargine Injectable (LANTUS) 13 Unit(s) SubCutaneous at bedtime  insulin lispro (ADMELOG) corrective regimen sliding scale   SubCutaneous Before meals and at bedtime  insulin lispro Injectable (ADMELOG) 5 Unit(s) SubCutaneous three times a day before meals  lactobacillus acidophilus 1 Tablet(s) Oral every 12 hours  midodrine. 20 milliGRAM(s) Oral three times a day  mupirocin 2% Nasal 1 Application(s) Both Nostrils two times a day  piperacillin/tazobactam IVPB.. 3.375 Gram(s) IV Intermittent every 12 hours    MEDICATIONS  (PRN):  acetaminophen     Tablet .. 650 milliGRAM(s) Oral every 4 hours PRN Temp greater or equal to 38C (100.4F), Mild Pain (1 - 3)  aluminum hydroxide/magnesium hydroxide/simethicone Suspension 30 milliLiter(s) Oral every 4 hours PRN Dyspepsia  HYDROmorphone  Injectable 0.5 milliGRAM(s) IV Push every 4 hours PRN Severe Pain (7 - 10)  melatonin 3 milliGRAM(s) Oral at bedtime PRN Insomnia  ondansetron Injectable 4 milliGRAM(s) IV Push every 8 hours PRN Nausea and/or Vomiting  oxyCODONE    IR 10 milliGRAM(s) Oral every 6 hours PRN Moderate Pain (4 - 6)      OBJECTIVE    T(C): 36.7 (10-17-23 @ 11:50), Max: 37.3 (10-17-23 @ 04:22)  HR: 63 (10-17-23 @ 10:00) (60 - 76)  BP: 118/44 (10-17-23 @ 10:00) (91/42 - 126/59)  RR: 17 (10-17-23 @ 10:00) (12 - 21)  SpO2: 100% (10-17-23 @ 10:00) (95% - 100%)  Wt(kg): --  I&O's Summary    16 Oct 2023 07:01  -  17 Oct 2023 07:00  --------------------------------------------------------  IN: 1170 mL / OUT: 0 mL / NET: 1170 mL          REVIEW OF SYSTEMS:  CONSTITUTIONAL: No fever, weight loss, or fatigue  EYES: No eye pain, visual disturbances, or discharge  ENMT:   No sinus or throat pain  NECK: No pain or stiffness  RESPIRATORY: No cough, wheezing, chills or hemoptysis; No shortness of breath  CARDIOVASCULAR: No chest pain, palpitations, dizziness, or leg swelling  GASTROINTESTINAL: No abdominal pain. No nausea, vomiting; No diarrhea or constipation. No melena or hematochezia.  GENITOURINARY: No dysuria, frequency, hematuria, or incontinence  NEUROLOGICAL: No headaches, memory loss, loss of strength, numbness, or tremors  SKIN: No itching, burning, rashes, or lesions   MUSCULOSKELETAL: No joint pain or swelling; No muscle, back, or extremity pain    PHYSICAL EXAM:  Appearance: NAD. VS past 24 hrs -as above   HEENT:   Moist oral mucosa. Conjunctiva clear b/l.   Neck : supple  Respiratory: Lungs CTAB.  Gastrointestinal:  Soft, nontender. No rebound. No rigidity. BS present	  Cardiovascular: RRR ,S1S2 present  Neurologic: Non-focal. Moving all extremities.  Extremities: No edema. No erythema. No calf tenderness.  Skin: No rashes, No ecchymoses, No cyanosis.	  wounds ,skin lesions-See skin assesment flow sheet   LABS:                        9.9    11.20 )-----------( 472      ( 17 Oct 2023 05:35 )             30.7     10-17    137  |  101  |  26<H>  ----------------------------<  261<H>  4.0   |  26  |  6.40<H>    Ca    8.4<L>      17 Oct 2023 05:35  Phos  4.2     10-17  Mg     2.4     10-17      CAPILLARY BLOOD GLUCOSE      POCT Blood Glucose.: 177 mg/dL (17 Oct 2023 11:04)  POCT Blood Glucose.: 286 mg/dL (17 Oct 2023 07:37)  POCT Blood Glucose.: 159 mg/dL (16 Oct 2023 21:14)  POCT Blood Glucose.: 190 mg/dL (16 Oct 2023 17:17)      Urinalysis Basic - ( 17 Oct 2023 05:35 )    Color: x / Appearance: x / SG: x / pH: x  Gluc: 261 mg/dL / Ketone: x  / Bili: x / Urobili: x   Blood: x / Protein: x / Nitrite: x   Leuk Esterase: x / RBC: x / WBC x   Sq Epi: x / Non Sq Epi: x / Bacteria: x        Culture - Surgical Swab (collected 11 Oct 2023 13:51)  Source: .Surgical Swab Surgical Swab  Final Report (14 Oct 2023 15:54):    Moderate Escherichia coli    Few Streptococcus agalactiae (Group B) Streptococcus agalactiae (Group B)    isolated    Group B streptococci are susceptible to ampicillin,    penicillin and cefazolin, but may be resistant to    erythromycin and clindamycin.    Rare Streptococcus mitis/oralis group "Susceptibilities not performed"    Numerous Actinomyces odontolyticus "Susceptibilities not performed"    Few Streptococcus agalactiae (Group B) isolated    Group B streptococci are susceptible to ampicillin,    penicillin and cefazolin, but may be resistant to    erythromycin and clindamycin.  Organism: Escherichia coli (14 Oct 2023 15:54)  Organism: Escherichia coli (14 Oct 2023 15:54)    Culture - Blood (collected 10 Oct 2023 12:00)  Source: .Blood Blood-Peripheral  Final Report (15 Oct 2023 19:00):    No growth at 5 days    Culture - Blood (collected 10 Oct 2023 11:15)  Source: .Blood Blood-Peripheral  Final Report (15 Oct 2023 16:00):    No growth at 5 days    Culture - Other (collected 10 Oct 2023 10:30)  Source: Skin Skin  Final Report (13 Oct 2023 15:09):    Culture yields growth of greater than 3 colony types of    bacteria,  which may indicate contamination and normal yoana    Call client services within 7 days if further workup is clinically    indicated. Culture includes    Numerous Streptococcus agalactiae (Group B) isolated    Group B streptococci are susceptible to ampicillin,    penicillin and cefazolin, but may be resistant to    erythromycin and clindamycin.    Numerous Escherichia coli  Organism: Escherichia coli (13 Oct 2023 15:09)  Organism: Escherichia coli (13 Oct 2023 15:09)      RADIOLOGY & ADDITIONAL TESTS:   reviewed elctronically  ASSESSMENT/PLAN: 	     PROGRESS NOTE  Patient is a 48y old  Male who presents with a chief complaint of weakness (17 Oct 2023 11:28)    Chart and available morning labs /imaging are reviewed electronically , urgent issues addressed . More information  is being added upon completion of rounds , when more information is collected and management discussed with consultants , medical staff and social service/case management on the floor   OVERNIGHT  - TTE: EF 56%, mild to mod pulm regurg  - Floor transfer requestedFeeling well with no complaints.    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 Sin/Dr. Singh) Presents to ED [facility] 10/10 with weakness for 2 weeks. 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 Wound care consult and ID consults are requested . (10 Oct 2023 12:52)    PAST MEDICAL & SURGICAL HISTORY:  ESRD on dialysis      H/O hypotension      Diabetes mellitus      Leg wound, left      Steal syndrome dialysis vascular access      Gangrene of finger of right hand      PVD (peripheral vascular disease)      Anemia of chronic disease      Constipation      HLD (hyperlipidemia)      S/P BKA (below knee amputation) bilateral      AV fistula          MEDICATIONS  (STANDING):  atorvastatin 40 milliGRAM(s) Oral at bedtime  dextrose 5%. 1000 milliLiter(s) (50 mL/Hr) IV Continuous <Continuous>  dextrose 5%. 1000 milliLiter(s) (100 mL/Hr) IV Continuous <Continuous>  dextrose 50% Injectable 12.5 Gram(s) IV Push once  dextrose 50% Injectable 25 Gram(s) IV Push once  dextrose 50% Injectable 25 Gram(s) IV Push once  dorzolamide 2%/timolol 0.5% Ophthalmic Solution 1 Drop(s) Both EYES two times a day  glucagon  Injectable 1 milliGRAM(s) IntraMuscular once  heparin   Injectable 5000 Unit(s) SubCutaneous every 12 hours  insulin glargine Injectable (LANTUS) 13 Unit(s) SubCutaneous at bedtime  insulin lispro (ADMELOG) corrective regimen sliding scale   SubCutaneous Before meals and at bedtime  insulin lispro Injectable (ADMELOG) 5 Unit(s) SubCutaneous three times a day before meals  lactobacillus acidophilus 1 Tablet(s) Oral every 12 hours  midodrine. 20 milliGRAM(s) Oral three times a day  mupirocin 2% Nasal 1 Application(s) Both Nostrils two times a day  piperacillin/tazobactam IVPB.. 3.375 Gram(s) IV Intermittent every 12 hours    MEDICATIONS  (PRN):  acetaminophen     Tablet .. 650 milliGRAM(s) Oral every 4 hours PRN Temp greater or equal to 38C (100.4F), Mild Pain (1 - 3)  aluminum hydroxide/magnesium hydroxide/simethicone Suspension 30 milliLiter(s) Oral every 4 hours PRN Dyspepsia  HYDROmorphone  Injectable 0.5 milliGRAM(s) IV Push every 4 hours PRN Severe Pain (7 - 10)  melatonin 3 milliGRAM(s) Oral at bedtime PRN Insomnia  ondansetron Injectable 4 milliGRAM(s) IV Push every 8 hours PRN Nausea and/or Vomiting  oxyCODONE    IR 10 milliGRAM(s) Oral every 6 hours PRN Moderate Pain (4 - 6)      OBJECTIVE    T(C): 36.7 (10-17-23 @ 11:50), Max: 37.3 (10-17-23 @ 04:22)  HR: 63 (10-17-23 @ 10:00) (60 - 76)  BP: 118/44 (10-17-23 @ 10:00) (91/42 - 126/59)  RR: 17 (10-17-23 @ 10:00) (12 - 21)  SpO2: 100% (10-17-23 @ 10:00) (95% - 100%)  Wt(kg): --  I&O's Summary    16 Oct 2023 07:01  -  17 Oct 2023 07:00  --------------------------------------------------------  IN: 1170 mL / OUT: 0 mL / NET: 1170 mL          REVIEW OF SYSTEMS:  CONSTITUTIONAL: No fever, weight loss, or fatigue  EYES: No eye pain, visual disturbances, or discharge  ENMT:   No sinus or throat pain  NECK: No pain or stiffness  RESPIRATORY: No cough, wheezing, chills or hemoptysis; No shortness of breath  CARDIOVASCULAR: No chest pain, palpitations, dizziness, or leg swelling  GASTROINTESTINAL: No abdominal pain. No nausea, vomiting; No diarrhea or constipation. No melena or hematochezia.  GENITOURINARY: No dysuria, frequency, hematuria, or incontinence  NEUROLOGICAL: No headaches, memory loss, loss of strength, numbness, or tremors  SKIN: No itching, burning, rashes, or lesions   MUSCULOSKELETAL: No joint pain or swelling; No muscle, back, or extremity pain    PHYSICAL EXAM:  Appearance: NAD. VS past 24 hrs -as above   HEENT:   Moist oral mucosa. Conjunctiva clear b/l.   Neck : supple  Respiratory: Lungs CTAB.  Gastrointestinal:  Soft, nontender. No rebound. No rigidity. BS present	  Cardiovascular: RRR ,S1S2 present  Neurologic: Non-focal. Moving all extremities.  Extremities: No edema. No erythema. No calf tenderness.  Skin: No rashes, No ecchymoses, No cyanosis.	  wounds ,skin lesions-See skin assesment flow sheet   LABS:                        9.9    11.20 )-----------( 472      ( 17 Oct 2023 05:35 )             30.7     10-17    137  |  101  |  26<H>  ----------------------------<  261<H>  4.0   |  26  |  6.40<H>    Ca    8.4<L>      17 Oct 2023 05:35  Phos  4.2     10-17  Mg     2.4     10-17      CAPILLARY BLOOD GLUCOSE      POCT Blood Glucose.: 177 mg/dL (17 Oct 2023 11:04)  POCT Blood Glucose.: 286 mg/dL (17 Oct 2023 07:37)  POCT Blood Glucose.: 159 mg/dL (16 Oct 2023 21:14)  POCT Blood Glucose.: 190 mg/dL (16 Oct 2023 17:17)      Urinalysis Basic - ( 17 Oct 2023 05:35 )    Color: x / Appearance: x / SG: x / pH: x  Gluc: 261 mg/dL / Ketone: x  / Bili: x / Urobili: x   Blood: x / Protein: x / Nitrite: x   Leuk Esterase: x / RBC: x / WBC x   Sq Epi: x / Non Sq Epi: x / Bacteria: x        Culture - Surgical Swab (collected 11 Oct 2023 13:51)  Source: .Surgical Swab Surgical Swab  Final Report (14 Oct 2023 15:54):    Moderate Escherichia coli    Few Streptococcus agalactiae (Group B) Streptococcus agalactiae (Group B)    isolated    Group B streptococci are susceptible to ampicillin,    penicillin and cefazolin, but may be resistant to    erythromycin and clindamycin.    Rare Streptococcus mitis/oralis group "Susceptibilities not performed"    Numerous Actinomyces odontolyticus "Susceptibilities not performed"    Few Streptococcus agalactiae (Group B) isolated    Group B streptococci are susceptible to ampicillin,    penicillin and cefazolin, but may be resistant to    erythromycin and clindamycin.  Organism: Escherichia coli (14 Oct 2023 15:54)  Organism: Escherichia coli (14 Oct 2023 15:54)    Culture - Blood (collected 10 Oct 2023 12:00)  Source: .Blood Blood-Peripheral  Final Report (15 Oct 2023 19:00):    No growth at 5 days    Culture - Blood (collected 10 Oct 2023 11:15)  Source: .Blood Blood-Peripheral  Final Report (15 Oct 2023 16:00):    No growth at 5 days    Culture - Other (collected 10 Oct 2023 10:30)  Source: Skin Skin  Final Report (13 Oct 2023 15:09):    Culture yields growth of greater than 3 colony types of    bacteria,  which may indicate contamination and normal yoana    Call client services within 7 days if further workup is clinically    indicated. Culture includes    Numerous Streptococcus agalactiae (Group B) isolated    Group B streptococci are susceptible to ampicillin,    penicillin and cefazolin, but may be resistant to    erythromycin and clindamycin.    Numerous Escherichia coli  Organism: Escherichia coli (13 Oct 2023 15:09)  Organism: Escherichia coli (13 Oct 2023 15:09)      RADIOLOGY & ADDITIONAL TESTS:   reviewed elctronically  ASSESSMENT/PLAN: 	    25 minutes aggregate time was spent on this visit, 50% visit time spent in care co-ordination with other attendings and counselling patient .I have discussed care plan with patient / HCP/family member ,who expressed understanding of problems treatment and their effect and side effects, alternatives in details. I have asked if they have any questions and concerns and appropriately addressed them to best of my ability.

## 2023-10-17 NOTE — PROGRESS NOTE ADULT - SUBJECTIVE AND OBJECTIVE BOX
INTERVAL HPI/OVERNIGHT EVENTS:  - TTE: EF 56%, mild to mod pulm regurg  - Floor transfer requested    SUBJECTIVE: Patient seen and examined at bedside. Feeling well with no complaints.    ROS:  Constitutional: no fever, chills, fatigue  Neuro: no headache, numbness, weakness, dizziness  Resp: no cough, wheezing, shortness of breath  CVS: no chest pain, palpitations, leg swelling  GI: no abdominal pain, nausea, vomiting, diarrhea   : no dysuria, frequency, incontinence  Skin: no itching, burning, rashes, or lesions   Msk: +LLE pain    OBJECTIVE:    VITAL SIGNS:  ICU Vital Signs Last 24 Hrs  T(C): 36.8 (17 Oct 2023 07:55), Max: 37.3 (17 Oct 2023 04:22)  T(F): 98.3 (17 Oct 2023 07:55), Max: 99.1 (17 Oct 2023 04:22)  HR: 63 (17 Oct 2023 10:00) (60 - 76)  BP: 118/44 (17 Oct 2023 10:00) (91/42 - 126/59)  BP(mean): 63 (17 Oct 2023 10:00) (60 - 85)  ABP: --  ABP(mean): --  RR: 17 (17 Oct 2023 10:00) (12 - 21)  SpO2: 100% (17 Oct 2023 10:00) (95% - 100%)    O2 Parameters below as of 17 Oct 2023 07:00  Patient On (Oxygen Delivery Method): room air              10-16 @ 07:01  -  10-17 @ 07:00  --------------------------------------------------------  IN: 1170 mL / OUT: 0 mL / NET: 1170 mL      CAPILLARY BLOOD GLUCOSE      POCT Blood Glucose.: 177 mg/dL (17 Oct 2023 11:04)      PHYSICAL EXAM:  General: NAD, comfortable  HEENT: NCAT, clear conjunctiva, no scleral icterus  Respiratory: CTA b/l, no wheezing, rhonchi, rales  Cardiovascular: RRR, normal S1S2, no M/R/G  Abdomen: soft, NT/ND, bowel sounds present  Extremities: LLE dressings C/D/I, +mild TTP, well-healed s/p R BKA and R hand 3rd digit amputation changes  Neurology: awake and alert    MEDICATIONS:  MEDICATIONS  (STANDING):  atorvastatin 40 milliGRAM(s) Oral at bedtime  dextrose 5%. 1000 milliLiter(s) (50 mL/Hr) IV Continuous <Continuous>  dextrose 5%. 1000 milliLiter(s) (100 mL/Hr) IV Continuous <Continuous>  dextrose 50% Injectable 12.5 Gram(s) IV Push once  dextrose 50% Injectable 25 Gram(s) IV Push once  dextrose 50% Injectable 25 Gram(s) IV Push once  dorzolamide 2%/timolol 0.5% Ophthalmic Solution 1 Drop(s) Both EYES two times a day  glucagon  Injectable 1 milliGRAM(s) IntraMuscular once  heparin   Injectable 5000 Unit(s) SubCutaneous every 12 hours  insulin glargine Injectable (LANTUS) 13 Unit(s) SubCutaneous at bedtime  insulin lispro (ADMELOG) corrective regimen sliding scale   SubCutaneous Before meals and at bedtime  insulin lispro Injectable (ADMELOG) 5 Unit(s) SubCutaneous three times a day before meals  lactobacillus acidophilus 1 Tablet(s) Oral every 12 hours  midodrine. 20 milliGRAM(s) Oral three times a day  mupirocin 2% Nasal 1 Application(s) Both Nostrils two times a day  piperacillin/tazobactam IVPB.. 3.375 Gram(s) IV Intermittent every 12 hours    MEDICATIONS  (PRN):  acetaminophen     Tablet .. 650 milliGRAM(s) Oral every 4 hours PRN Temp greater or equal to 38C (100.4F), Mild Pain (1 - 3)  aluminum hydroxide/magnesium hydroxide/simethicone Suspension 30 milliLiter(s) Oral every 4 hours PRN Dyspepsia  HYDROmorphone  Injectable 0.5 milliGRAM(s) IV Push every 4 hours PRN Severe Pain (7 - 10)  melatonin 3 milliGRAM(s) Oral at bedtime PRN Insomnia  ondansetron Injectable 4 milliGRAM(s) IV Push every 8 hours PRN Nausea and/or Vomiting  oxyCODONE    IR 10 milliGRAM(s) Oral every 6 hours PRN Moderate Pain (4 - 6)      ALLERGIES:  Allergies    No Known Drug Allergies  [place] (Rash)    Intolerances        LABS:                        9.9    11.20 )-----------( 472      ( 17 Oct 2023 05:35 )             30.7     10-17    137  |  101  |  26<H>  ----------------------------<  261<H>  4.0   |  26  |  6.40<H>    Ca    8.4<L>      17 Oct 2023 05:35  Phos  4.2     10-17  Mg     2.4     10-17        Urinalysis Basic - ( 17 Oct 2023 05:35 )    Color: x / Appearance: x / SG: x / pH: x  Gluc: 261 mg/dL / Ketone: x  / Bili: x / Urobili: x   Blood: x / Protein: x / Nitrite: x   Leuk Esterase: x / RBC: x / WBC x   Sq Epi: x / Non Sq Epi: x / Bacteria: x        RADIOLOGY & ADDITIONAL TESTS:    TTE 10/17:    CONCLUSIONS:      1. Left ventricular cavity is normal. Left ventricular wall thickness is normal. Left ventricular systolic function is normalwith an ejection fraction of 56 % by Starkey's method of disks. There are no regional wall motion abnormalities seen.   2. Normal left ventricular diastolic function, with normal filling pressure.   3. No significant valvular disease.   4. Mild to moderate pulmonic regurgitation.   5. There is moderate calcification of the mitral valve annulus.      Lines/Tubes: R brachial (HD), PIVs (R EJ x1)      GLOBAL ISSUE/BEST PRACTICE  Analgesia: Y  Sedation: Y  HOB elevation: yes  Stress ulcer prophylaxis: Y  VTE prophylaxis: Y  Glycemic control: Y  Nutrition: Y    CODE STATUS: Full Code

## 2023-10-17 NOTE — PROGRESS NOTE ADULT - ATTENDING COMMENTS
Mr. Jeovanny Kilgore is a 48M h/o type 2 DM2, ESRD on HD (MWF; last 10/9), s/p b/l BKA, recent prolonged admission at [facility] 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 Still on pressors for HD today.    NEURO: pain better controlled today; continue gabapentin with prn tylenol and oxycodone. Added dilaudid 0.5mg q 4 hrs prn for severe pain.  CV: jennifer-operative shock state distributive from sepsis/anesthesia vs hypovolemia, initially resolved, though required re-initiation of pressors during HD session., add midodrine 10mg tid as needed to assist with weaning from pressor support. Pressor requirements increased during HD today. Midodrine increased to 20mg q8hrs. 500mL bolus given as patient appears hypovolemic.   PULM: no acute issues  GI: diet as tolerated, continue bowel regimen  RENAL: ESRD on HD. Session today. No urgent indication for HD today.   ID: continue broad coverage with vanc (by level) and zosyn pending OR wound cultures  - MRSA swab+ - continue mupirocin  - blood cultures from admission remain NGTD  - Vancomycin level 24 today. Will obtain vancomycin level 4 hours after HD and dose today.   ENDO: glucose poorly controlled. Increased lantus to 15 units. Humalog to 7 units premeal and sliding scale.   HEME: dvt ppx with HSQ  MSK: continue wound dressing changes daily and follow-up with vascular regarding plan for RTOR for definitive closure  Ethics: Full Code
Mr. 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 Now on 0.01.    NEURO: pain better controlled today; continue gabapentin with prn tylenol and oxycodone. Added dilaudid 0.5mg q 4 hrs prn for severe pain.  CV: jennifer-operative shock state distributive from sepsis/anesthesia vs hypovolemia, initially resolved, though required re-initiation of pressors during HD session., add midodrine 10mg tid as needed to assist with weaning from pressor support. Will assess patient underoging HD tomorrow to see if he requires pressor assistance.  PULM: no acute issues  GI: diet as tolerated, continue bowel regimen  RENAL: ESRD on HD, for next session Saturday. No urgent indication for HD today.   ID: continue broad coverage with vanc (by level) and zosyn pending OR wound cultures  - MRSA swab+ - continue mupirocin  - blood cultures from admission remain NGTD  - Vancomycin level 26.  ENDO: glucose poorly controlled. Increased lantus to 10 units. Humalog to 5 units premeal and sliding scale.   HEME: dvt ppx with HSQ  MSK: continue wound dressing changes daily and follow-up with vascular regarding plan for RTOR for definitive closure  Ethics: Full Code
Gas gangrene of LLE BKA stump  Will formalize next week  ICU care appreciated
48M h/o type 2 DM2, ESRD on HD (MWF; last 10/9), s/p b/l BKA, recent prolonged admission at [facility] for gas gangrene of R forearm and R 3rd digit requiring multiple irrigations and debridements (last 3/2023) with wound vac placement and eventual skin graft now POD#1 s/p revision of L BKA to AKA for debridement of necrotizing fascitis of stump wound, transferred to ICU for distributive vs hypovolemic shock requiring pressor support, weaned off overnight though requiring re-initiation during scheduled HD today.     NEURO: pain better controlled today; continue gabapentin with prn tylenol and oxycodone   CV: jennifer-operative shock state distributive from sepsis/anesthesia vs hypovolemia, initially resolved, though required re-initiation of pressors during HD session today, can add midodrine 10mg tid as needed to assist with weaning from pressor support if unable to wean off after HD completed  PULM: no acute issues  GI: diet as tolerated, continue bowel regimen  RENAL: ESRD on HD, for next session today (not dialyzed yesterday)  ID: continue broad coverage with vanc (by level) and zosyn pending OR wound cultures  - MRSA swab+ - continue mupiricin  - blood cultures from admission remain NGTD  ENDO: glucose poorly controlled, on Lantus 4 units and Humalog 4units premeal at home - will monitor ISS coverage requirements and redose lantus accordingly (received 4units last night) and increase ISS to moderate scale  HEME: dvt ppx with HSQ  MSK: continue wound dressing changes daily and follow-up with vascualr regarding plan for RTOR for definitive closure
Mr. 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 Finally off pressors. Better glucose control.    NEURO: pain better controlled today; continue gabapentin with prn tylenol and oxycodone. Added dilaudid 0.5mg q 4 hrs prn for severe pain.  CV: jennifer-operative shock state distributive from sepsis/anesthesia vs hypovolemia, initially resolved, though required re-initiation of pressors during HD session. Midodrine 20mg TID. BPs are extremely labile.  GI: diet as tolerated. 4 episodes of soft stool yesterday. Dc'd bowel regimen.  RENAL: ESRD on HD. No urgent indication for HD today.   ID: continue Zosyn. F/u ID recs.  - MRSA swab+ - continue mupirocin  - blood cultures from admission remain NGTD  ENDO: glucose poorly controlled. Increased lantus to 15 units. Humalog to 7 units premeal and sliding scale with improvement.  HEME: dvt ppx with HSQ  MSK: continue wound dressing changes daily and follow-up with vascular regarding plan for RTOR for definitive closure  Ethics: Full Code  Dispo: Will keep in ICU for HD tomorrow given patient's very labile BPs.
48M with T2DM, ESRD on HD (MWF), b/l BKA,  R forearm and 3rd digit gangrene, who is now s/p LLE knee disarticulation for necrotizing fasciitis of BKA stump. Pt admitted to ICU for shock which is now resolved.       NEURO:  continue gabapentin with prn tylenol and oxycodone. Added dilaudid 0.5mg q 4 hrs prn for severe pain.  CV: jennifer-operative shock state distributive from sepsis/anesthesia vs hypovolemia, resolved. Continue midodrine 20mg TID. Trend BP.  GI: diet as tolerated. 4 episodes of soft stool yesterday. Dc'd bowel regimen.  RENAL: ESRD on HD. HF per nephrology.  ID: continue Zosyn for now, will follow up with ID regarding transitioning to CTX given wound culture sensitivities  - MRSA swab+ - continue mupirocin  - blood cultures from admission remain NGTD  ENDO: FS fluctuating. Continue basal/bolus insulin with ISS. Adjust prn  HEME: dvt ppx with HSQ  MSK: continue wound dressing changes daily and follow-up with vascular regarding plan for RTOR for definitive closure  Ethics: Full Code
48M with T2DM, ESRD on HD (MWF), b/l BKA,  R forearm and 3rd digit gangrene, who is now s/p LLE knee disarticulation for necrotizing fasciitis of BKA stump. Pt admitted to ICU for shock which is now resolved.     NEURO:  continue gabapentin with prn tylenol and oxycodon  CV: jennifer-operative shock state distributive from sepsis/anesthesia vs hypovolemia, resolved. Continue midodrine 20mg TID. Trend BP.  GI: diet as tolerated. 4 episodes of soft stool yesterday. Dc'd bowel regimen.  RENAL: ESRD on HD. HF per nephrology.  ID: continue Zosyn for now, will follow up with ID regarding transitioning to CTX given wound culture sensitivities  - MRSA swab+ - continue mupirocin  - blood cultures from admission remain NGTD  ENDO: FS fluctuating. Continue basal/bolus insulin with ISS. Adjust prn  HEME: dvt ppx with HSQ  MSK: continue wound dressing changes daily , definitive closure planned for 10/19  Ethics: Full Code

## 2023-10-17 NOTE — PROGRESS NOTE ADULT - ASSESSMENT
48 yr old male with PMHx of DM2, ESRD on H.D. 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 1/2023 requiring multiple irrigation and debridements (last 3/2023) wound vac therapy with eventual Rt 3rd digit of Rt hand amputation (approx 2/2023) p/w worsening LLE wound/purulent drainage i/s/o hypoTN, leukocytosis, fever c/f sepsis now s/p Zosyn course and emergent L AKA 10/11 c/b refractory hypoTN requiring pressors    Neuro:  -Off sedation, melatonin for sleep  -Pain: Tylenol 650 q6, oxy prn    CV:  -A/w refractory hypoTN c/f septic shock, now improving  -MAPs >65, now off pressors  -Continue midodrine 20 TID  -Lactate neg    Pulm:  -SpO2 >92% RA  -IS postop    GI:  -Diet: renal restricted + CC diet w/ snack  -PPI and Milox  -Zofran PRN for n/v    Renal:  -ESRD w/ HD MWF through R brachial AVF; plan for HD M 10/16  -Serum lytes currently stable  -Nephrology following    Heme:  -H/h stable  -DVT ppx: SQH    ID:  -A/w septic shock and c/f LLE necrotizing fasciitis now s/p AKA, doing well clinically at the moment but will CTM closely  -Per VSx plan for OR 10/19 for stump closure  -MRSA swab 10/11 positive  -WBC on admit 22k now down to 11k, currently afebrile  -IVABX per ID: Zosyn 10/12-10/18; s/p Vanco x3  -BCx 10/10 NGF, OR Cx: +GBS +EColi, Wound Cx: +GBS    Endo:  -Hx DM2  -Persistently elevated AM BGs that normalize PM, suspecting 2/2 dextrose in nightly Zosyn, will switch to NS base  -Decreased lantus to 13u and 5u premeal, consider readjusting    Lines/Tubes: R brachial (HD), PIVs (R EJ x1)    Dispo: ICU

## 2023-10-17 NOTE — PROGRESS NOTE ADULT - ASSESSMENT
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 Sin/Dr. Singh) Presents to ED [facility] 10/10 with weakness for 2 weeks    anemia  esrd  weakness  DM  BKA  PAD  PVD  Pain    post op -   on midodrine  vs noted  on ABX  on Opioids for pain  labs reviewed    full code  lives with family at home - in [facility] as per renal  pain relief  oral hygiene  skin care  wound care  assist with needs

## 2023-10-17 NOTE — PROGRESS NOTE ADULT - ASSESSMENT
REASON FOR VISIT  .. Management of problems listed below        REVIEW OF SYMPTOMS   Able to give ROS  Yes     RELIABILITY +/-   CONSTITUTIONAL Weakness Yes    ENDOCRINE  No heat or cold intolerance    ALLERGY No hives  Sore throat No stridor  RESP Shortness of breath YES   NEURO New weakness No   CARDIAC   Palpitations No         PHYSICAL EXAM    HEENT Unremarkable  atraumatic   RESP Fair air entry  Harsh breath sound   CARDIAC S1 S2 No S3     NO JVD    ABDOMEN No hepatosplenomegaly   bl bka  NO rash       GENERAL DATA .     GOC.  .. 10/10/2023 full code  ICU STAY. .. 10/11/2023   COVID. ..  scv2 10/10/2023 (-)  BEST PRACTICE ISSUES.    HOB ELEVATN. .. Yes  DVT PPLX. ..  10/10/2023 [facility]    SPEECH SWALLOW RECOMMENDATIONS.    ..        DIET.   ..  10/11 renal restrn   ..  10/10/2023 npo   IV fl ...   BOLUS.   .. 10/11/2023 4a ns 500   .. 10/10/2023 4p ns 250   .. 10/10/2023 12p ns 500  .. 10/10/2023 9a ns 1000   STRESS ULCER PPLX. ..    10/10/2023 protonix 40  INFECTION PPLX. ..   10/13 mupirocin 2%   ALLGY. ..   turkey                      WT. ..  10/10/2023 59  BMI. ..      PROCEDURES.  .. 10/11/2023 l knee disarticulation     ABGS.   .     VS/ PO/IO/ VENT/ DRIPS.  10/17/2023 60 130/50   10/17/2023 ra 100%     ADMISSION SUMMARY.   . 48 m PMH esrd admitted 10/10 with shock found to have necroitizing fascitis   . Pt had l knee disarticulation done 10/11     MAIN ISSUES   . SHOCK Resolvd  .. echo 10/16/2023   .... ef 56%   .... n lvdd   . SEPSIS   . SKIN SOFT TISSUE CELLULITIS  10/10 l post knee pressure ulcer   . NECROITIZING FASCITIS 10/10 L knee ct   . l KNEE DISARTICULATION 10/11   .. 10/11 surg strep agalacticae gp b e coli sens piptaz   . ESRD     OVERALL PLAN  . SSTI On vanco started 10/10 On Vanco started 10/10   . NECROTIZING FASCITIS 10/11/2023   . SHOCK 10/12-10/14/2023 Nore  Target MAP 65 (+)  . SP Urgent DISARTICULATION l knee 10/11   ..  RTOR 10/19 for AKA closure     TIME SPENT.   . Over 36 minutes aggregate care time spent on encounter; activities included   direct patient care, counseling and/or coordinating care reviewing notes, lab data/ imaging , discussion with multidisciplinary team/ patient  /family and explaining in detail risks, benefits, alternatives  of the recommendations     MICHAEL TIRADO 48 m 10/10/2023 [address] DR ELENA SCHMUTER DR MOHSEN PAHLAVAN

## 2023-10-17 NOTE — PROGRESS NOTE ADULT - SUBJECTIVE AND OBJECTIVE BOX
CHIEF COMPLAINT/ REASON FOR VISIT  .. Patient was seen to address the  issue listed under PROBLEM LIST which is located toward bottom of this note     MELVI RODRIGUEZ    [facility] ICU1 07A    Allergies    No Known Drug Allergies  Turkey (Rash)    Intolerances        PAST MEDICAL & SURGICAL HISTORY:  ESRD on dialysis      H/O hypotension      Diabetes mellitus      Leg wound, left      Steal syndrome dialysis vascular access      Gangrene of finger of right hand      PVD (peripheral vascular disease)      Anemia of chronic disease      Constipation      HLD (hyperlipidemia)      S/P BKA (below knee amputation) bilateral      AV fistula          FAMILY HISTORY:      Home Medications:  Admelog 100 units/mL injectable solution: 4 unit(s) subcutaneously 3 times a day (10 Oct 2023 14:32)  amLODIPine 5 mg oral tablet: 1 tab(s) orally once a day (10 Oct 2023 14:32)  atorvastatin 40 mg oral tablet: 1 tab(s) orally once a day (10 Oct 2023 14:32)  insulin glargine 100 units/mL subcutaneous solution: 4 unit(s) subcutaneous once a day (at bedtime) (10 Oct 2023 14:32)  senna (sennosides) 8.6 mg oral tablet: 2 tab(s) orally once a day (at bedtime) (10 Oct 2023 14:32)  Velphoro 500 mg oral tablet, chewable: 3 tab(s) chewed 3 times a day (10 Oct 2023 14:32)      MEDICATIONS  (STANDING):  atorvastatin 40 milliGRAM(s) Oral at bedtime  dextrose 5%. 1000 milliLiter(s) (50 mL/Hr) IV Continuous <Continuous>  dextrose 5%. 1000 milliLiter(s) (100 mL/Hr) IV Continuous <Continuous>  dextrose 50% Injectable 25 Gram(s) IV Push once  dextrose 50% Injectable 25 Gram(s) IV Push once  dextrose 50% Injectable 12.5 Gram(s) IV Push once  dorzolamide 2%/timolol 0.5% Ophthalmic Solution 1 Drop(s) Both EYES two times a day  glucagon  Injectable 1 milliGRAM(s) IntraMuscular once  heparin   Injectable 5000 Unit(s) SubCutaneous every 12 hours  insulin glargine Injectable (LANTUS) 13 Unit(s) SubCutaneous at bedtime  insulin lispro (ADMELOG) corrective regimen sliding scale   SubCutaneous Before meals and at bedtime  insulin lispro Injectable (ADMELOG) 5 Unit(s) SubCutaneous three times a day before meals  lactobacillus acidophilus 1 Tablet(s) Oral every 12 hours  midodrine. 20 milliGRAM(s) Oral three times a day  mupirocin 2% Nasal 1 Application(s) Both Nostrils two times a day  piperacillin/tazobactam IVPB.. 3.375 Gram(s) IV Intermittent every 12 hours    MEDICATIONS  (PRN):  acetaminophen     Tablet .. 650 milliGRAM(s) Oral every 4 hours PRN Temp greater or equal to 38C (100.4F), Mild Pain (1 - 3)  aluminum hydroxide/magnesium hydroxide/simethicone Suspension 30 milliLiter(s) Oral every 4 hours PRN Dyspepsia  HYDROmorphone  Injectable 0.5 milliGRAM(s) IV Push every 4 hours PRN Severe Pain (7 - 10)  melatonin 3 milliGRAM(s) Oral at bedtime PRN Insomnia  ondansetron Injectable 4 milliGRAM(s) IV Push every 8 hours PRN Nausea and/or Vomiting  oxyCODONE    IR 5 milliGRAM(s) Oral every 6 hours PRN Moderate Pain (4 - 6)      Diet, DASH/TLC:   Sodium & Cholesterol Restricted  Consistent Carbohydrate Evening Snack  For patients receiving Renal Replacement - No Protein Restr, No Conc K, No Conc Phos, Low Sodium (RENAL)  Supplement Feeding Modality:  Oral  Nepro Cans or Servings Per Day:  1       Frequency:  Three Times a day (10-16-23 @ 14:16) [Active]          Vital Signs Last 24 Hrs  T(C): 37.3 (17 Oct 2023 04:22), Max: 37.3 (17 Oct 2023 04:22)  T(F): 99.1 (17 Oct 2023 04:22), Max: 99.1 (17 Oct 2023 04:22)  HR: 63 (17 Oct 2023 06:20) (54 - 76)  BP: 118/51 (17 Oct 2023 06:20) (91/42 - 129/61)  BP(mean): 74 (17 Oct 2023 06:20) (60 - 88)  RR: 15 (17 Oct 2023 06:20) (11 - 21)  SpO2: 100% (17 Oct 2023 06:20) (95% - 100%)    Parameters below as of 17 Oct 2023 06:00  Patient On (Oxygen Delivery Method): room air          10-15-23 @ 07:01  -  10-16-23 @ 07:00  --------------------------------------------------------  IN: 980 mL / OUT: 0 mL / NET: 980 mL    10-16-23 @ 07:01  -  10-17-23 @ 06:50  --------------------------------------------------------  IN: 1170 mL / OUT: 0 mL / NET: 1170 mL              LABS:                        9.8    11.44 )-----------( 464      ( 16 Oct 2023 06:10 )             31.4     10-16    134<L>  |  99  |  38<H>  ----------------------------<  255<H>  3.6   |  23  |  8.10<H>    Ca    8.6      16 Oct 2023 06:10  Phos  4.4     10-16  Mg     2.5     10-16    TPro  7.2  /  Alb  1.9<L>  /  TBili  0.5  /  DBili  x   /  AST  11<L>  /  ALT  13  /  AlkPhos  134<H>  10-15      Urinalysis Basic - ( 16 Oct 2023 06:10 )    Color: x / Appearance: x / SG: x / pH: x  Gluc: 255 mg/dL / Ketone: x  / Bili: x / Urobili: x   Blood: x / Protein: x / Nitrite: x   Leuk Esterase: x / RBC: x / WBC x   Sq Epi: x / Non Sq Epi: x / Bacteria: x            WBC:  WBC Count: 11.44 K/uL (10-16 @ 06:10)  WBC Count: 11.77 K/uL (10-15 @ 10:40)  WBC Count: 10.99 K/uL (10-14 @ 06:04)      MICROBIOLOGY:  RECENT CULTURES:  10-11 .Surgical Swab Surgical Swab Escherichia coli XXXX   Moderate Escherichia coli  Few Streptococcus agalactiae (Group B) Streptococcus agalactiae (Group B)  isolated  Group B streptococci are susceptible to ampicillin,  penicillin and cefazolin, but may be resistant to  erythromycin and clindamycin.  Rare Streptococcus mitis/oralis group "Susceptibilities not performed"  Numerous Actinomyces odontolyticus "Susceptibilities not performed"  Few Streptococcus agalactiae (Group B) isolated  Group B streptococci are susceptible to ampicillin,  penicillin and cefazolin, but may be resistant to  erythromycin and clindamycin.    10-10 .Blood Blood-Peripheral XXXX XXXX   No growth at 5 days    10-10 .Blood Blood-Peripheral XXXX XXXX   No growth at 5 days    10-10 Skin Skin Escherichia coli XXXX   Culture yields growth of greater than 3 colony types of  bacteria,  which may indicate contamination and normal yoana  Call client services within 7 days if further workup is clinically  indicated. Culture includes  Numerous Streptococcus agalactiae (Group B) isolated  Group B streptococci are susceptible to ampicillin,  penicillin and cefazolin, but may be resistant to  erythromycin and clindamycin.  Numerous Escherichia coli                    Sodium:  Sodium: 134 mmol/L (10-16 @ 06:10)  Sodium: 138 mmol/L (10-15 @ 10:40)  Sodium: 133 mmol/L (10-14 @ 06:04)      8.10 mg/dL 10-16 @ 06:10  6.50 mg/dL 10-15 @ 10:40  8.40 mg/dL 10-14 @ 06:04      Hemoglobin:  Hemoglobin: 9.8 g/dL (10-16 @ 06:10)  Hemoglobin: 9.5 g/dL (10-15 @ 10:40)  Hemoglobin: 11.1 g/dL (10-14 @ 06:04)      Platelets: Platelet Count - Automated: 464 K/uL (10-16 @ 06:10)  Platelet Count - Automated: 406 K/uL (10-15 @ 10:40)  Platelet Count - Automated: 464 K/uL (10-14 @ 06:04)      LIVER FUNCTIONS - ( 15 Oct 2023 10:40 )  Alb: 1.9 g/dL / Pro: 7.2 g/dL / ALK PHOS: 134 U/L / ALT: 13 U/L / AST: 11 U/L / GGT: x             Urinalysis Basic - ( 16 Oct 2023 06:10 )    Color: x / Appearance: x / SG: x / pH: x  Gluc: 255 mg/dL / Ketone: x  / Bili: x / Urobili: x   Blood: x / Protein: x / Nitrite: x   Leuk Esterase: x / RBC: x / WBC x   Sq Epi: x / Non Sq Epi: x / Bacteria: x        RADIOLOGY & ADDITIONAL STUDIES:      MICROBIOLOGY:  RECENT CULTURES:  10-11 .Surgical Swab Surgical Swab Escherichia coli XXXX   Moderate Escherichia coli  Few Streptococcus agalactiae (Group B) Streptococcus agalactiae (Group B)  isolated  Group B streptococci are susceptible to ampicillin,  penicillin and cefazolin, but may be resistant to  erythromycin and clindamycin.  Rare Streptococcus mitis/oralis group "Susceptibilities not performed"  Numerous Actinomyces odontolyticus "Susceptibilities not performed"  Few Streptococcus agalactiae (Group B) isolated  Group B streptococci are susceptible to ampicillin,  penicillin and cefazolin, but may be resistant to  erythromycin and clindamycin.    10-10 .Blood Blood-Peripheral XXXX XXXX   No growth at 5 days    10-10 .Blood Blood-Peripheral XXXX XXXX   No growth at 5 days    10-10 Skin Skin Escherichia coli XXXX   Culture yields growth of greater than 3 colony types of  bacteria,  which may indicate contamination and normal yoana  Call client services within 7 days if further workup is clinically  indicated. Culture includes  Numerous Streptococcus agalactiae (Group B) isolated  Group B streptococci are susceptible to ampicillin,  penicillin and cefazolin, but may be resistant to  erythromycin and clindamycin.  Numerous Escherichia coli

## 2023-10-17 NOTE — PROGRESS NOTE ADULT - SUBJECTIVE AND OBJECTIVE BOX
Patient is a 48y Male whom presented to the hospital with esrd on hd     PAST MEDICAL & SURGICAL HISTORY:  ESRD on dialysis      H/O hypotension      Diabetes mellitus      Leg wound, left      Steal syndrome dialysis vascular access      Gangrene of finger of right hand      PVD (peripheral vascular disease)      Anemia of chronic disease      Constipation      HLD (hyperlipidemia)      S/P BKA (below knee amputation) bilateral      AV fistula          MEDICATIONS  (STANDING):  atorvastatin 40 milliGRAM(s) Oral at bedtime  collagenase Ointment 1 Application(s) Topical daily  dextrose 5%. 1000 milliLiter(s) (50 mL/Hr) IV Continuous <Continuous>  dextrose 5%. 1000 milliLiter(s) (100 mL/Hr) IV Continuous <Continuous>  dextrose 50% Injectable 25 Gram(s) IV Push once  dextrose 50% Injectable 25 Gram(s) IV Push once  dextrose 50% Injectable 12.5 Gram(s) IV Push once  dorzolamide 2%/timolol 0.5% Ophthalmic Solution 1 Drop(s) Both EYES two times a day  glucagon  Injectable 1 milliGRAM(s) IntraMuscular once  heparin   Injectable 5000 Unit(s) SubCutaneous every 12 hours  insulin lispro (ADMELOG) corrective regimen sliding scale   SubCutaneous every 6 hours  lactobacillus acidophilus 1 Tablet(s) Oral every 12 hours  midodrine. 10 milliGRAM(s) Oral three times a day  pantoprazole    Tablet 40 milliGRAM(s) Oral before breakfast  piperacillin/tazobactam IVPB.. 3.375 Gram(s) IV Intermittent every 12 hours  senna 2 Tablet(s) Oral at bedtime      Allergies    No Known Drug Allergies  [place] (Rash)    Intolerances        SOCIAL HISTORY:  Denies ETOh,Smoking,     FAMILY HISTORY:      REVIEW OF SYSTEMS:    CONSTITUTIONAL: No weakness, fevers or chills  EYES/ENT: No visual changes;  no throat pain   NECK: No pain or stiffness  RESPIRATORY: No cough, wheezing, hemoptysis; No shortness of breath  CARDIOVASCULAR: No chest pain or palpitations  GASTROINTESTINAL: No abdominal or epigastric pain. No nausea, vomiting,     No diarrhea or constipation. No melena                                                                             9.9    11.20 )-----------( 472      ( 17 Oct 2023 05:35 )             30.7       CBC Full  -  ( 17 Oct 2023 05:35 )  WBC Count : 11.20 K/uL  RBC Count : 3.29 M/uL  Hemoglobin : 9.9 g/dL  Hematocrit : 30.7 %  Platelet Count - Automated : 472 K/uL  Mean Cell Volume : 93.3 fl  Mean Cell Hemoglobin : 30.1 pg  Mean Cell Hemoglobin Concentration : 32.2 gm/dL  Auto Neutrophil # : 6.86 K/uL  Auto Lymphocyte # : 2.28 K/uL  Auto Monocyte # : 0.99 K/uL  Auto Eosinophil # : 0.60 K/uL  Auto Basophil # : 0.10 K/uL  Auto Neutrophil % : 61.2 %  Auto Lymphocyte % : 20.4 %  Auto Monocyte % : 8.8 %  Auto Eosinophil % : 5.4 %  Auto Basophil % : 0.9 %      10-16    134<L>  |  99  |  38<H>  ----------------------------<  255<H>  3.6   |  23  |  8.10<H>    Ca    8.6      16 Oct 2023 06:10  Phos  4.2     10-17  Mg     2.4     10-17    TPro  7.2  /  Alb  1.9<L>  /  TBili  0.5  /  DBili  x   /  AST  11<L>  /  ALT  13  /  AlkPhos  134<H>  10-15      CAPILLARY BLOOD GLUCOSE      POCT Blood Glucose.: 286 mg/dL (17 Oct 2023 07:37)  POCT Blood Glucose.: 159 mg/dL (16 Oct 2023 21:14)  POCT Blood Glucose.: 190 mg/dL (16 Oct 2023 17:17)  POCT Blood Glucose.: 124 mg/dL (16 Oct 2023 11:38)      Vital Signs Last 24 Hrs  T(C): 36.8 (17 Oct 2023 07:55), Max: 37.3 (17 Oct 2023 04:22)  T(F): 98.3 (17 Oct 2023 07:55), Max: 99.1 (17 Oct 2023 04:22)  HR: 62 (17 Oct 2023 07:00) (54 - 76)  BP: 114/53 (17 Oct 2023 07:00) (91/42 - 129/61)  BP(mean): 76 (17 Oct 2023 07:00) (60 - 88)  RR: 20 (17 Oct 2023 07:00) (11 - 21)  SpO2: 99% (17 Oct 2023 07:00) (95% - 100%)    Parameters below as of 17 Oct 2023 07:00  Patient On (Oxygen Delivery Method): room air        Urinalysis Basic - ( 16 Oct 2023 06:10 )    Color: x / Appearance: x / SG: x / pH: x  Gluc: 255 mg/dL / Ketone: x  / Bili: x / Urobili: x   Blood: x / Protein: x / Nitrite: x   Leuk Esterase: x / RBC: x / WBC x   Sq Epi: x / Non Sq Epi: x / Bacteria: x                    PHYSICAL EXAM:    Constitutional: NAD  HEENT: conjunctive   clear   Neck:  No JVD  Respiratory: CTAB  Cardiovascular: S1 and S2  Gastrointestinal: BS+, soft, NT/ND  Extremities: No peripheral edema  Neurological: no focal deficits  Psychiatric: Normal mood, normal affect  : No [place]  Skin: No rashes   S/P BKA (below knee amputation) bilateral  AV fistula  LABS:

## 2023-10-17 NOTE — PROGRESS NOTE ADULT - SUBJECTIVE AND OBJECTIVE BOX
Patient is a 48y old  Male who presents with a chief complaint of weakness (17 Oct 2023 11:53)      Pt without complaints  Denies fevers or chills  Awaiting transfer to floor    MEDICATIONS  (STANDING):  atorvastatin 40 milliGRAM(s) Oral at bedtime  dextrose 5%. 1000 milliLiter(s) (100 mL/Hr) IV Continuous <Continuous>  dextrose 5%. 1000 milliLiter(s) (50 mL/Hr) IV Continuous <Continuous>  dextrose 50% Injectable 25 Gram(s) IV Push once  dextrose 50% Injectable 12.5 Gram(s) IV Push once  dextrose 50% Injectable 25 Gram(s) IV Push once  dorzolamide 2%/timolol 0.5% Ophthalmic Solution 1 Drop(s) Both EYES two times a day  glucagon  Injectable 1 milliGRAM(s) IntraMuscular once  heparin   Injectable 5000 Unit(s) SubCutaneous every 12 hours  insulin glargine Injectable (LANTUS) 13 Unit(s) SubCutaneous at bedtime  insulin lispro (ADMELOG) corrective regimen sliding scale   SubCutaneous Before meals and at bedtime  insulin lispro Injectable (ADMELOG) 5 Unit(s) SubCutaneous three times a day before meals  lactobacillus acidophilus 1 Tablet(s) Oral every 12 hours  midodrine. 20 milliGRAM(s) Oral three times a day  mupirocin 2% Nasal 1 Application(s) Both Nostrils two times a day  piperacillin/tazobactam IVPB.. 3.375 Gram(s) IV Intermittent every 12 hours    MEDICATIONS  (PRN):  acetaminophen     Tablet .. 650 milliGRAM(s) Oral every 4 hours PRN Temp greater or equal to 38C (100.4F), Mild Pain (1 - 3)  aluminum hydroxide/magnesium hydroxide/simethicone Suspension 30 milliLiter(s) Oral every 4 hours PRN Dyspepsia  HYDROmorphone  Injectable 0.5 milliGRAM(s) IV Push every 4 hours PRN Severe Pain (7 - 10)  melatonin 3 milliGRAM(s) Oral at bedtime PRN Insomnia  ondansetron Injectable 4 milliGRAM(s) IV Push every 8 hours PRN Nausea and/or Vomiting  oxyCODONE    IR 10 milliGRAM(s) Oral every 6 hours PRN Moderate Pain (4 - 6)      Allergies  No Known Drug Allergies  [place] (Rash)      Vital Signs Last 24 Hrs  T(C): 36.7 (17 Oct 2023 11:50), Max: 37.3 (17 Oct 2023 04:22)  T(F): 98.1 (17 Oct 2023 11:50), Max: 99.1 (17 Oct 2023 04:22)  HR: 62 (17 Oct 2023 11:00) (60 - 76)  BP: 122/55 (17 Oct 2023 11:00) (91/53 - 126/59)  BP(mean): 79 (17 Oct 2023 11:00) (60 - 85)  RR: 16 (17 Oct 2023 11:00) (12 - 21)  SpO2: 100% (17 Oct 2023 11:00) (95% - 100%)    Parameters below as of 17 Oct 2023 07:00  Patient On (Oxygen Delivery Method): room air      I&O's Detail    16 Oct 2023 07:01  -  17 Oct 2023 07:00  --------------------------------------------------------  IN:    IV PiggyBack: 200 mL    Oral Fluid: 970 mL  Total IN: 1170 mL    OUT:    Other (mL): 0 mL    Voided (mL): 0 mL  Total OUT: 0 mL    Total NET: 1170 mL      Physical Exam:  General: NAD, resting comfortably in bed  Pulmonary: Nonlabored breathing, no respiratory distress, CTA  Cardiovascular: NSR  Abdominal: soft, NT/ND  Extremities: L stump dressing changed. Posterior thigh mildly tender; no drainage appreciated; no malodor. Dressing changed; betadine soaked kerlex ABD and ACE wrap applied. Pt tolerated well      LABS:                        9.9    11.20 )-----------( 472      ( 17 Oct 2023 05:35 )             30.7     10-17    137  |  101  |  26<H>  ----------------------------<  261<H>  4.0   |  26  |  6.40<H>    Ca    8.4<L>      17 Oct 2023 05:35  Phos  4.2     10-17  Mg     2.4     10-17      CAPILLARY BLOOD GLUCOSE  POCT Blood Glucose.: 177 mg/dL (17 Oct 2023 11:04)  POCT Blood Glucose.: 286 mg/dL (17 Oct 2023 07:37)  POCT Blood Glucose.: 159 mg/dL (16 Oct 2023 21:14)  POCT Blood Glucose.: 190 mg/dL (16 Oct 2023 17:17)    Radiology and Additional Studies:

## 2023-10-17 NOTE — PROGRESS NOTE ADULT - SUBJECTIVE AND OBJECTIVE BOX
Date/Time Patient Seen:  		  Referring MD:   Data Reviewed	       Patient is a 48y old  Male who presents with a chief complaint of weakness (16 Oct 2023 18:32)      Subjective/HPI     PAST MEDICAL & SURGICAL HISTORY:  ESRD on dialysis    H/O hypotension    Diabetes mellitus    Leg wound, left    Steal syndrome dialysis vascular access    Gangrene of finger of right hand    PVD (peripheral vascular disease)    Anemia of chronic disease    Constipation    HLD (hyperlipidemia)    S/P BKA (below knee amputation) bilateral    AV fistula          Medication list         MEDICATIONS  (STANDING):  atorvastatin 40 milliGRAM(s) Oral at bedtime  dextrose 5%. 1000 milliLiter(s) (50 mL/Hr) IV Continuous <Continuous>  dextrose 5%. 1000 milliLiter(s) (100 mL/Hr) IV Continuous <Continuous>  dextrose 50% Injectable 25 Gram(s) IV Push once  dextrose 50% Injectable 25 Gram(s) IV Push once  dextrose 50% Injectable 12.5 Gram(s) IV Push once  dorzolamide 2%/timolol 0.5% Ophthalmic Solution 1 Drop(s) Both EYES two times a day  glucagon  Injectable 1 milliGRAM(s) IntraMuscular once  heparin   Injectable 5000 Unit(s) SubCutaneous every 12 hours  insulin glargine Injectable (LANTUS) 13 Unit(s) SubCutaneous at bedtime  insulin lispro (ADMELOG) corrective regimen sliding scale   SubCutaneous Before meals and at bedtime  insulin lispro Injectable (ADMELOG) 5 Unit(s) SubCutaneous three times a day before meals  lactobacillus acidophilus 1 Tablet(s) Oral every 12 hours  midodrine. 20 milliGRAM(s) Oral three times a day  mupirocin 2% Nasal 1 Application(s) Both Nostrils two times a day  piperacillin/tazobactam IVPB.. 3.375 Gram(s) IV Intermittent every 12 hours    MEDICATIONS  (PRN):  acetaminophen     Tablet .. 650 milliGRAM(s) Oral every 4 hours PRN Temp greater or equal to 38C (100.4F), Mild Pain (1 - 3)  aluminum hydroxide/magnesium hydroxide/simethicone Suspension 30 milliLiter(s) Oral every 4 hours PRN Dyspepsia  HYDROmorphone  Injectable 0.5 milliGRAM(s) IV Push every 4 hours PRN Severe Pain (7 - 10)  melatonin 3 milliGRAM(s) Oral at bedtime PRN Insomnia  ondansetron Injectable 4 milliGRAM(s) IV Push every 8 hours PRN Nausea and/or Vomiting  oxyCODONE    IR 5 milliGRAM(s) Oral every 6 hours PRN Moderate Pain (4 - 6)         Vitals log        ICU Vital Signs Last 24 Hrs  T(C): 37.3 (17 Oct 2023 04:22), Max: 37.3 (17 Oct 2023 04:22)  T(F): 99.1 (17 Oct 2023 04:22), Max: 99.1 (17 Oct 2023 04:22)  HR: 65 (17 Oct 2023 05:33) (54 - 76)  BP: 107/49 (17 Oct 2023 05:33) (91/42 - 129/61)  BP(mean): 71 (17 Oct 2023 05:33) (57 - 88)  ABP: --  ABP(mean): --  RR: 17 (17 Oct 2023 05:33) (11 - 22)  SpO2: 99% (17 Oct 2023 05:33) (95% - 100%)    O2 Parameters below as of 17 Oct 2023 05:33  Patient On (Oxygen Delivery Method): room air                 Input and Output:  I&O's Detail    15 Oct 2023 07:01  -  16 Oct 2023 07:00  --------------------------------------------------------  IN:    IV PiggyBack: 200 mL    Oral Fluid: 780 mL  Total IN: 980 mL    OUT:    Voided (mL): 0 mL  Total OUT: 0 mL    Total NET: 980 mL      16 Oct 2023 07:01  -  17 Oct 2023 05:49  --------------------------------------------------------  IN:    IV PiggyBack: 200 mL    Oral Fluid: 970 mL  Total IN: 1170 mL    OUT:    Other (mL): 0 mL  Total OUT: 0 mL    Total NET: 1170 mL          Lab Data                        9.8    11.44 )-----------( 464      ( 16 Oct 2023 06:10 )             31.4     10-16    134<L>  |  99  |  38<H>  ----------------------------<  255<H>  3.6   |  23  |  8.10<H>    Ca    8.6      16 Oct 2023 06:10  Phos  4.4     10-16  Mg     2.5     10-16    TPro  7.2  /  Alb  1.9<L>  /  TBili  0.5  /  DBili  x   /  AST  11<L>  /  ALT  13  /  AlkPhos  134<H>  10-15            Review of Systems	      Objective     Physical Examination    heart s1s2  lung dc BS      Pertinent Lab findings & Imaging      [place]:  NO   Adequate UO     I&O's Detail    15 Oct 2023 07:01  -  16 Oct 2023 07:00  --------------------------------------------------------  IN:    IV PiggyBack: 200 mL    Oral Fluid: 780 mL  Total IN: 980 mL    OUT:    Voided (mL): 0 mL  Total OUT: 0 mL    Total NET: 980 mL      16 Oct 2023 07:01  -  17 Oct 2023 05:49  --------------------------------------------------------  IN:    IV PiggyBack: 200 mL    Oral Fluid: 970 mL  Total IN: 1170 mL    OUT:    Other (mL): 0 mL  Total OUT: 0 mL    Total NET: 1170 mL               Discussed with:     Cultures:	        Radiology

## 2023-10-18 ENCOUNTER — TRANSCRIPTION ENCOUNTER (OUTPATIENT)
Age: 48
End: 2023-10-18

## 2023-10-18 LAB
ANION GAP SERPL CALC-SCNC: 14 MMOL/L — SIGNIFICANT CHANGE UP (ref 5–17)
ANION GAP SERPL CALC-SCNC: 14 MMOL/L — SIGNIFICANT CHANGE UP (ref 5–17)
BASOPHILS # BLD AUTO: 0 K/UL — SIGNIFICANT CHANGE UP (ref 0–0.2)
BASOPHILS # BLD AUTO: 0 K/UL — SIGNIFICANT CHANGE UP (ref 0–0.2)
BASOPHILS NFR BLD AUTO: 0 % — SIGNIFICANT CHANGE UP (ref 0–2)
BASOPHILS NFR BLD AUTO: 0 % — SIGNIFICANT CHANGE UP (ref 0–2)
BUN SERPL-MCNC: 39 MG/DL — HIGH (ref 7–23)
BUN SERPL-MCNC: 39 MG/DL — HIGH (ref 7–23)
CALCIUM SERPL-MCNC: 8.7 MG/DL — SIGNIFICANT CHANGE UP (ref 8.5–10.1)
CALCIUM SERPL-MCNC: 8.7 MG/DL — SIGNIFICANT CHANGE UP (ref 8.5–10.1)
CHLORIDE SERPL-SCNC: 101 MMOL/L — SIGNIFICANT CHANGE UP (ref 96–108)
CHLORIDE SERPL-SCNC: 101 MMOL/L — SIGNIFICANT CHANGE UP (ref 96–108)
CO2 SERPL-SCNC: 22 MMOL/L — SIGNIFICANT CHANGE UP (ref 22–31)
CO2 SERPL-SCNC: 22 MMOL/L — SIGNIFICANT CHANGE UP (ref 22–31)
CREAT SERPL-MCNC: 9.1 MG/DL — HIGH (ref 0.5–1.3)
CREAT SERPL-MCNC: 9.1 MG/DL — HIGH (ref 0.5–1.3)
EGFR: 7 ML/MIN/1.73M2 — LOW
EGFR: 7 ML/MIN/1.73M2 — LOW
EOSINOPHIL # BLD AUTO: 0.72 K/UL — HIGH (ref 0–0.5)
EOSINOPHIL # BLD AUTO: 0.72 K/UL — HIGH (ref 0–0.5)
EOSINOPHIL NFR BLD AUTO: 6 % — SIGNIFICANT CHANGE UP (ref 0–6)
EOSINOPHIL NFR BLD AUTO: 6 % — SIGNIFICANT CHANGE UP (ref 0–6)
GLUCOSE SERPL-MCNC: 142 MG/DL — HIGH (ref 70–99)
GLUCOSE SERPL-MCNC: 142 MG/DL — HIGH (ref 70–99)
HCT VFR BLD CALC: 27.5 % — LOW (ref 39–50)
HCT VFR BLD CALC: 27.5 % — LOW (ref 39–50)
HGB BLD-MCNC: 8.7 G/DL — LOW (ref 13–17)
HGB BLD-MCNC: 8.7 G/DL — LOW (ref 13–17)
LYMPHOCYTES # BLD AUTO: 2.51 K/UL — SIGNIFICANT CHANGE UP (ref 1–3.3)
LYMPHOCYTES # BLD AUTO: 2.51 K/UL — SIGNIFICANT CHANGE UP (ref 1–3.3)
LYMPHOCYTES # BLD AUTO: 21 % — SIGNIFICANT CHANGE UP (ref 13–44)
LYMPHOCYTES # BLD AUTO: 21 % — SIGNIFICANT CHANGE UP (ref 13–44)
MAGNESIUM SERPL-MCNC: 2.4 MG/DL — SIGNIFICANT CHANGE UP (ref 1.6–2.6)
MAGNESIUM SERPL-MCNC: 2.4 MG/DL — SIGNIFICANT CHANGE UP (ref 1.6–2.6)
MCHC RBC-ENTMCNC: 29.5 PG — SIGNIFICANT CHANGE UP (ref 27–34)
MCHC RBC-ENTMCNC: 29.5 PG — SIGNIFICANT CHANGE UP (ref 27–34)
MCHC RBC-ENTMCNC: 31.6 GM/DL — LOW (ref 32–36)
MCHC RBC-ENTMCNC: 31.6 GM/DL — LOW (ref 32–36)
MCV RBC AUTO: 93.2 FL — SIGNIFICANT CHANGE UP (ref 80–100)
MCV RBC AUTO: 93.2 FL — SIGNIFICANT CHANGE UP (ref 80–100)
MONOCYTES # BLD AUTO: 1.2 K/UL — HIGH (ref 0–0.9)
MONOCYTES # BLD AUTO: 1.2 K/UL — HIGH (ref 0–0.9)
MONOCYTES NFR BLD AUTO: 10 % — SIGNIFICANT CHANGE UP (ref 2–14)
MONOCYTES NFR BLD AUTO: 10 % — SIGNIFICANT CHANGE UP (ref 2–14)
NEUTROPHILS # BLD AUTO: 7.54 K/UL — HIGH (ref 1.8–7.4)
NEUTROPHILS # BLD AUTO: 7.54 K/UL — HIGH (ref 1.8–7.4)
NEUTROPHILS NFR BLD AUTO: 63 % — SIGNIFICANT CHANGE UP (ref 43–77)
NEUTROPHILS NFR BLD AUTO: 63 % — SIGNIFICANT CHANGE UP (ref 43–77)
NRBC # BLD: SIGNIFICANT CHANGE UP /100 WBCS (ref 0–0)
NRBC # BLD: SIGNIFICANT CHANGE UP /100 WBCS (ref 0–0)
PHOSPHATE SERPL-MCNC: 5.2 MG/DL — HIGH (ref 2.5–4.5)
PHOSPHATE SERPL-MCNC: 5.2 MG/DL — HIGH (ref 2.5–4.5)
PLATELET # BLD AUTO: 486 K/UL — HIGH (ref 150–400)
PLATELET # BLD AUTO: 486 K/UL — HIGH (ref 150–400)
POTASSIUM SERPL-MCNC: 3.7 MMOL/L — SIGNIFICANT CHANGE UP (ref 3.5–5.3)
POTASSIUM SERPL-MCNC: 3.7 MMOL/L — SIGNIFICANT CHANGE UP (ref 3.5–5.3)
POTASSIUM SERPL-SCNC: 3.7 MMOL/L — SIGNIFICANT CHANGE UP (ref 3.5–5.3)
POTASSIUM SERPL-SCNC: 3.7 MMOL/L — SIGNIFICANT CHANGE UP (ref 3.5–5.3)
RBC # BLD: 2.95 M/UL — LOW (ref 4.2–5.8)
RBC # BLD: 2.95 M/UL — LOW (ref 4.2–5.8)
RBC # FLD: 15 % — HIGH (ref 10.3–14.5)
RBC # FLD: 15 % — HIGH (ref 10.3–14.5)
SODIUM SERPL-SCNC: 137 MMOL/L — SIGNIFICANT CHANGE UP (ref 135–145)
SODIUM SERPL-SCNC: 137 MMOL/L — SIGNIFICANT CHANGE UP (ref 135–145)
WBC # BLD: 11.97 K/UL — HIGH (ref 3.8–10.5)
WBC # BLD: 11.97 K/UL — HIGH (ref 3.8–10.5)
WBC # FLD AUTO: 11.97 K/UL — HIGH (ref 3.8–10.5)
WBC # FLD AUTO: 11.97 K/UL — HIGH (ref 3.8–10.5)

## 2023-10-18 RX ORDER — ERYTHROPOIETIN 10000 [IU]/ML
10000 INJECTION, SOLUTION INTRAVENOUS; SUBCUTANEOUS
Refills: 0 | Status: DISCONTINUED | OUTPATIENT
Start: 2023-10-18 | End: 2023-10-19

## 2023-10-18 RX ORDER — CEFPODOXIME PROXETIL 100 MG
200 TABLET ORAL EVERY 24 HOURS
Refills: 0 | Status: DISCONTINUED | OUTPATIENT
Start: 2023-10-18 | End: 2023-10-19

## 2023-10-18 RX ORDER — ERYTHROPOIETIN 10000 [IU]/ML
10000 INJECTION, SOLUTION INTRAVENOUS; SUBCUTANEOUS
Refills: 0 | Status: DISCONTINUED | OUTPATIENT
Start: 2023-10-18 | End: 2023-10-18

## 2023-10-18 RX ADMIN — MUPIROCIN 1 APPLICATION(S): 20 OINTMENT TOPICAL at 05:16

## 2023-10-18 RX ADMIN — ERYTHROPOIETIN 10000 UNIT(S): 10000 INJECTION, SOLUTION INTRAVENOUS; SUBCUTANEOUS at 11:36

## 2023-10-18 RX ADMIN — Medication 2: at 17:51

## 2023-10-18 RX ADMIN — Medication 1 TABLET(S): at 05:18

## 2023-10-18 RX ADMIN — Medication 1: at 08:44

## 2023-10-18 RX ADMIN — HEPARIN SODIUM 5000 UNIT(S): 5000 INJECTION INTRAVENOUS; SUBCUTANEOUS at 17:50

## 2023-10-18 RX ADMIN — OXYCODONE HYDROCHLORIDE 10 MILLIGRAM(S): 5 TABLET ORAL at 16:40

## 2023-10-18 RX ADMIN — ATORVASTATIN CALCIUM 40 MILLIGRAM(S): 80 TABLET, FILM COATED ORAL at 21:28

## 2023-10-18 RX ADMIN — OXYCODONE HYDROCHLORIDE 10 MILLIGRAM(S): 5 TABLET ORAL at 15:57

## 2023-10-18 RX ADMIN — Medication 1 TABLET(S): at 17:52

## 2023-10-18 RX ADMIN — DORZOLAMIDE HYDROCHLORIDE TIMOLOL MALEATE 1 DROP(S): 20; 5 SOLUTION/ DROPS OPHTHALMIC at 17:50

## 2023-10-18 RX ADMIN — Medication 5 UNIT(S): at 08:46

## 2023-10-18 RX ADMIN — INSULIN GLARGINE 13 UNIT(S): 100 INJECTION, SOLUTION SUBCUTANEOUS at 21:28

## 2023-10-18 RX ADMIN — PIPERACILLIN AND TAZOBACTAM 25 GRAM(S): 4; .5 INJECTION, POWDER, LYOPHILIZED, FOR SOLUTION INTRAVENOUS at 15:59

## 2023-10-18 RX ADMIN — Medication 5 UNIT(S): at 17:51

## 2023-10-18 RX ADMIN — HYDROMORPHONE HYDROCHLORIDE 0.5 MILLIGRAM(S): 2 INJECTION INTRAMUSCULAR; INTRAVENOUS; SUBCUTANEOUS at 21:27

## 2023-10-18 RX ADMIN — HYDROMORPHONE HYDROCHLORIDE 0.5 MILLIGRAM(S): 2 INJECTION INTRAMUSCULAR; INTRAVENOUS; SUBCUTANEOUS at 21:42

## 2023-10-18 RX ADMIN — MIDODRINE HYDROCHLORIDE 20 MILLIGRAM(S): 2.5 TABLET ORAL at 05:16

## 2023-10-18 RX ADMIN — MIDODRINE HYDROCHLORIDE 20 MILLIGRAM(S): 2.5 TABLET ORAL at 17:52

## 2023-10-18 RX ADMIN — DORZOLAMIDE HYDROCHLORIDE TIMOLOL MALEATE 1 DROP(S): 20; 5 SOLUTION/ DROPS OPHTHALMIC at 05:16

## 2023-10-18 RX ADMIN — HEPARIN SODIUM 5000 UNIT(S): 5000 INJECTION INTRAVENOUS; SUBCUTANEOUS at 05:16

## 2023-10-18 NOTE — CASE MANAGEMENT PROGRESS NOTE - NSCMPROGRESSNOTE_GEN_ALL_CORE
Discussed on rounds this am.  Pt transferred from ICU last pm.  Case Management consult noted for managing medical conditions at home.  EMR reviewed, POD # 6 anticipated for OR 10/19.  A CM will remain available through hospitalization.

## 2023-10-18 NOTE — DISCHARGE NOTE PROVIDER - NSDCHHNEEDSERVICE_GEN_ALL_CORE
Medication teaching and assessment/Rehabilitation services Medication teaching and assessment/Observation and assessment/Rehabilitation services/Teaching and training/Wound care and assessment

## 2023-10-18 NOTE — DISCHARGE NOTE PROVIDER - NSDCMRMEDTOKEN_GEN_ALL_CORE_FT
Admelog 100 units/mL injectable solution: 4 unit(s) subcutaneously 3 times a day  amLODIPine 5 mg oral tablet: 1 tab(s) orally once a day  aspirin 81 mg oral tablet, chewable: 1 tab(s) orally once a day once a day  atorvastatin 40 mg oral tablet: 1 tab(s) orally once a day  dorzolamide-timolol 2%-0.5% preservative-free ophthalmic solution: 1 drop(s) to each affected eye 2 times a day two times per day  insulin glargine 100 units/mL subcutaneous solution: 4 unit(s) subcutaneous once a day (at bedtime)  senna (sennosides) 8.6 mg oral tablet: 2 tab(s) orally once a day (at bedtime)  Velphoro 500 mg oral tablet, chewable: 3 tab(s) chewed 3 times a day   acetaminophen 500 mg oral tablet: 2 tab(s) orally every 8 hours x 5 days then as needed  Admelog 100 units/mL injectable solution: 4 unit(s) subcutaneously 3 times a day  amLODIPine 5 mg oral tablet: 1 tab(s) orally once a day  aspirin 81 mg oral tablet, chewable: 1 tab(s) orally once a day once a day  atorvastatin 40 mg oral tablet: 1 tab(s) orally once a day  cefpodoxime 200 mg oral tablet: 1 tab(s) orally every 24 hours  dorzolamide-timolol 2%-0.5% preservative-free ophthalmic solution: 1 drop(s) to each affected eye 2 times a day two times per day  gabapentin 100 mg oral capsule: 1 cap(s) orally 2 times a day  insulin glargine 100 units/mL subcutaneous solution: 4 unit(s) subcutaneous once a day (at bedtime)  lactobacillus acidophilus oral capsule: 1 tab(s) orally 2 times a day OTC  senna (sennosides) 8.6 mg oral tablet: 2 tab(s) orally once a day (at bedtime)  Velphoro 500 mg oral tablet, chewable: 3 tab(s) chewed 3 times a day   acetaminophen 500 mg oral tablet: 2 tab(s) orally every 8 hours x 5 days then as needed  Admelog 100 units/mL injectable solution: 4 unit(s) subcutaneously 3 times a day  amLODIPine 5 mg oral tablet: 1 tab(s) orally once a day  aspirin 81 mg oral tablet, chewable: 1 tab(s) orally once a day once a day  atorvastatin 40 mg oral tablet: 1 tab(s) orally once a day  cefpodoxime 200 mg oral tablet: 1 tab(s) orally every 24 hours  dorzolamide-timolol 2%-0.5% preservative-free ophthalmic solution: 1 drop(s) to each affected eye 2 times a day two times per day  gabapentin 100 mg oral capsule: 1 cap(s) orally 2 times a day  HYDROmorphone 4 mg oral tablet: 1 tab(s) orally every 6 hours as needed for FOR MODERATE TO SEVERE PAIN  insulin glargine 100 units/mL subcutaneous solution: 4 unit(s) subcutaneous once a day (at bedtime)  lactobacillus acidophilus oral capsule: 1 tab(s) orally 2 times a day OTC  senna (sennosides) 8.6 mg oral tablet: 2 tab(s) orally once a day (at bedtime)  Velphoro 500 mg oral tablet, chewable: 3 tab(s) chewed 3 times a day   acetaminophen 500 mg oral tablet: 2 tab(s) orally every 8 hours x 5 days then as needed  Admelog 100 units/mL injectable solution: 4 unit(s) subcutaneously 3 times a day  amLODIPine 5 mg oral tablet: 1 tab(s) orally once a day  aspirin 81 mg oral tablet, chewable: 1 tab(s) orally once a day once a day  atorvastatin 40 mg oral tablet: 1 tab(s) orally once a day  dorzolamide-timolol 2%-0.5% preservative-free ophthalmic solution: 1 drop(s) to each affected eye 2 times a day two times per day  gabapentin 100 mg oral capsule: 1 cap(s) orally 2 times a day  HYDROmorphone 4 mg oral tablet: 1 tab(s) orally every 6 hours as needed for FOR MODERATE TO SEVERE PAIN  insulin glargine 100 units/mL subcutaneous solution: 4 unit(s) subcutaneous once a day (at bedtime)  Keflex 500 mg oral capsule: 1 cap(s) orally once a day x 5 days  lactobacillus acidophilus oral capsule: 1 tab(s) orally 2 times a day OTC  senna (sennosides) 8.6 mg oral tablet: 2 tab(s) orally once a day (at bedtime)  Velphoro 500 mg oral tablet, chewable: 3 tab(s) chewed 3 times a day

## 2023-10-18 NOTE — PROGRESS NOTE ADULT - ASSESSMENT
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 Sin/Dr. Singh) Presents to ED [facility] 10/10 with weakness for 2 weeks    anemia  esrd  weakness  DM  BKA  PAD  PVD  Pain    post op -   on midodrine  vs noted  on ABX  on Opioids for pain  labs reviewed    full code  lives with family at home - in [facility] as per renal  pain relief  oral hygiene  skin care  wound care  assist with needs

## 2023-10-18 NOTE — DISCHARGE NOTE PROVIDER - NSDCFUADDINST_GEN_ALL_CORE_FT
WOUND CARE LLE - Cleanse with NS and pat dry; apply dry gauze and wrap with kerlex and ACE with gentle compression

## 2023-10-18 NOTE — PROGRESS NOTE ADULT - SUBJECTIVE AND OBJECTIVE BOX
PROGRESS NOTE  Patient is a 48y old  Male who presents with a chief complaint of weakness (18 Oct 2023 09:25)  Chart and available morning labs /imaging are reviewed electronically , urgent issues addressed . More information  is being added upon completion of rounds , when more information is collected and management discussed with consultants , medical staff and social service/case management on the floor   OVERNIGHT  No new issues reported by medical staff . 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 Sin/Dr. Singh) Presents to ED [facility] [place] 10/10 with weakness for 2 weeks. 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 Wound care consult and ID consults are requested . (10 Oct 2023 12:52)    PAST MEDICAL & SURGICAL HISTORY:  ESRD on dialysis      H/O hypotension      Diabetes mellitus      Leg wound, left      Steal syndrome dialysis vascular access      Gangrene of finger of right hand      PVD (peripheral vascular disease)      Anemia of chronic disease      Constipation      HLD (hyperlipidemia)      S/P BKA (below knee amputation) bilateral      AV fistula          MEDICATIONS  (STANDING):  atorvastatin 40 milliGRAM(s) Oral at bedtime  dextrose 5%. 1000 milliLiter(s) (100 mL/Hr) IV Continuous <Continuous>  dextrose 5%. 1000 milliLiter(s) (50 mL/Hr) IV Continuous <Continuous>  dextrose 50% Injectable 12.5 Gram(s) IV Push once  dextrose 50% Injectable 25 Gram(s) IV Push once  dextrose 50% Injectable 25 Gram(s) IV Push once  dorzolamide 2%/timolol 0.5% Ophthalmic Solution 1 Drop(s) Both EYES two times a day  epoetin deidre (PROCRIT) Injectable 82458 Unit(s) IV Push <User Schedule>  glucagon  Injectable 1 milliGRAM(s) IntraMuscular once  heparin   Injectable 5000 Unit(s) SubCutaneous every 12 hours  insulin glargine Injectable (LANTUS) 13 Unit(s) SubCutaneous at bedtime  insulin lispro (ADMELOG) corrective regimen sliding scale   SubCutaneous Before meals and at bedtime  insulin lispro Injectable (ADMELOG) 5 Unit(s) SubCutaneous three times a day before meals  lactobacillus acidophilus 1 Tablet(s) Oral every 12 hours  midodrine. 20 milliGRAM(s) Oral three times a day  piperacillin/tazobactam IVPB.. 3.375 Gram(s) IV Intermittent every 12 hours    MEDICATIONS  (PRN):  acetaminophen     Tablet .. 650 milliGRAM(s) Oral every 4 hours PRN Temp greater or equal to 38C (100.4F), Mild Pain (1 - 3)  aluminum hydroxide/magnesium hydroxide/simethicone Suspension 30 milliLiter(s) Oral every 4 hours PRN Dyspepsia  HYDROmorphone  Injectable 0.5 milliGRAM(s) IV Push every 4 hours PRN Severe Pain (7 - 10)  melatonin 3 milliGRAM(s) Oral at bedtime PRN Insomnia  ondansetron Injectable 4 milliGRAM(s) IV Push every 8 hours PRN Nausea and/or Vomiting  oxyCODONE    IR 10 milliGRAM(s) Oral every 6 hours PRN Moderate Pain (4 - 6)      OBJECTIVE    T(C): 37.1 (10-18-23 @ 09:55), Max: 37.3 (10-17-23 @ 15:29)  HR: 62 (10-18-23 @ 09:55) (58 - 65)  BP: 135/71 (10-18-23 @ 09:55) (123/73 - 160/80)  RR: 18 (10-18-23 @ 09:55) (13 - 21)  SpO2: 100% (10-18-23 @ 09:55) (97% - 100%)  Wt(kg): --  I&O's Summary    17 Oct 2023 07:01  -  18 Oct 2023 07:00  --------------------------------------------------------  IN: 300 mL / OUT: 0 mL / NET: 300 mL          REVIEW OF SYSTEMS:  CONSTITUTIONAL: No fever, weight loss, or fatigue  EYES: No eye pain, visual disturbances, or discharge  ENMT:   No sinus or throat pain  NECK: No pain or stiffness  RESPIRATORY: No cough, wheezing, chills or hemoptysis; No shortness of breath  CARDIOVASCULAR: No chest pain, palpitations, dizziness, or leg swelling  GASTROINTESTINAL: No abdominal pain. No nausea, vomiting; No diarrhea or constipation. No melena or hematochezia.  GENITOURINARY: No dysuria, frequency, hematuria, or incontinence  NEUROLOGICAL: No headaches, memory loss, loss of strength, numbness, or tremors  SKIN: No itching, burning, rashes, or lesions   MUSCULOSKELETAL: No joint pain or swelling; No muscle, back, or extremity pain    PHYSICAL EXAM:  Appearance: NAD. VS past 24 hrs -as above   HEENT:   Moist oral mucosa. Conjunctiva clear b/l.   Neck : supple  Respiratory: Lungs CTAB.  Gastrointestinal:  Soft, nontender. No rebound. No rigidity. BS present	  Cardiovascular: RRR ,S1S2 present  Neurologic: Non-focal. Moving all extremities.  Extremities: No edema. No erythema. No calf tenderness.  Skin: No rashes, No ecchymoses, No cyanosis.	  wounds ,skin lesions-See skin assesment flow sheet   LABS:                        8.7    11.97 )-----------( 486      ( 18 Oct 2023 10:30 )             27.5     10-18    137  |  101  |  39<H>  ----------------------------<  142<H>  3.7   |  22  |  9.10<H>    Ca    8.7      18 Oct 2023 10:30  Phos  5.2     10-18  Mg     2.4     10-18      CAPILLARY BLOOD GLUCOSE      POCT Blood Glucose.: 187 mg/dL (18 Oct 2023 08:16)  POCT Blood Glucose.: 184 mg/dL (17 Oct 2023 21:40)  POCT Blood Glucose.: 186 mg/dL (17 Oct 2023 16:53)      Urinalysis Basic - ( 18 Oct 2023 10:30 )    Color: x / Appearance: x / SG: x / pH: x  Gluc: 142 mg/dL / Ketone: x  / Bili: x / Urobili: x   Blood: x / Protein: x / Nitrite: x   Leuk Esterase: x / RBC: x / WBC x   Sq Epi: x / Non Sq Epi: x / Bacteria: x        Culture - Surgical Swab (collected 11 Oct 2023 13:51)  Source: .Surgical Swab Surgical Swab  Final Report (14 Oct 2023 15:54):    Moderate Escherichia coli    Few Streptococcus agalactiae (Group B) Streptococcus agalactiae (Group B)    isolated    Group B streptococci are susceptible to ampicillin,    penicillin and cefazolin, but may be resistant to    erythromycin and clindamycin.    Rare Streptococcus mitis/oralis group "Susceptibilities not performed"    Numerous Actinomyces odontolyticus "Susceptibilities not performed"    Few Streptococcus agalactiae (Group B) isolated    Group B streptococci are susceptible to ampicillin,    penicillin and cefazolin, but may be resistant to    erythromycin and clindamycin.  Organism: Escherichia coli (14 Oct 2023 15:54)  Organism: Escherichia coli (14 Oct 2023 15:54)    Culture - Blood (collected 10 Oct 2023 12:00)  Source: .Blood Blood-Peripheral  Final Report (15 Oct 2023 19:00):    No growth at 5 days    Culture - Blood (collected 10 Oct 2023 11:15)  Source: .Blood Blood-Peripheral  Final Report (15 Oct 2023 16:00):    No growth at 5 days    Culture - Other (collected 10 Oct 2023 10:30)  Source: Skin Skin  Final Report (13 Oct 2023 15:09):    Culture yields growth of greater than 3 colony types of    bacteria,  which may indicate contamination and normal yoana    Call client services within 7 days if further workup is clinically    indicated. Culture includes    Numerous Streptococcus agalactiae (Group B) isolated    Group B streptococci are susceptible to ampicillin,    penicillin and cefazolin, but may be resistant to    erythromycin and clindamycin.    Numerous Escherichia coli  Organism: Escherichia coli (13 Oct 2023 15:09)  Organism: Escherichia coli (13 Oct 2023 15:09)      RADIOLOGY & ADDITIONAL TESTS:    reviewed elctronically  ASSESSMENT/PLAN: 	    25 minutes aggregate time was spent on this visit, 50% visit time spent in care co-ordination with other attendings and counselling patient .I have discussed care plan with patient / HCP/family member ,who expressed understanding of problems treatment and their effect and side effects, alternatives in details. I have asked if they have any questions and concerns and appropriately addressed them to best of my ability.

## 2023-10-18 NOTE — DISCHARGE NOTE PROVIDER - NSDCCPTREATMENT_GEN_ALL_CORE_FT
PRINCIPAL PROCEDURE  Procedure: Knee disarticulation  Findings and Treatment:       SECONDARY PROCEDURE  Procedure: Above knee amputation  Findings and Treatment:

## 2023-10-18 NOTE — DISCHARGE NOTE PROVIDER - PROVIDER TOKENS
PROVIDER:[TOKEN:[[id number]]] PROVIDER:[TOKEN:[[id number]],FOLLOWUP:[1 week]],PROVIDER:[TOKEN:[[id number]],FOLLOWUP:[1-3 days]],PROVIDER:[TOKEN:[[id number]],FOLLOWUP:[1-3 days]],PROVIDER:[TOKEN:[[id number]],FOLLOWUP:[1 week]],PROVIDER:[TOKEN:[[id number]],FOLLOWUP:[2 weeks]]

## 2023-10-18 NOTE — PROGRESS NOTE ADULT - ASSESSMENT
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 Sin/Dr. Singh) Presents to ED [facility] 10/10 with weakness for 2 weeks. 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 Wound care consult and ID consults are requested . (10 Oct 2023 12:52)      esrd on hd for mwf   Excess fluids and waste products will be removed from your blood; your electrolytes will be balanced; your blood pressure will be controlled.    Admit for septic workup and ID evaluation,send blood and urine cx,serial lactate levels,monitor vitals closley,ivfs hydration,monitor urine output and renal profile,iv abx as per id cons  vancomycin  IVPB 500 milliGRAM(s) IV Intermittent once    BP monitoring,continue current  meds, low salt diet,followup with PMD in 1-2 weeks  midodrine. 10 milliGRAM(s) Oral three times a day  norepinephrine Infusion 0.05 MICROgram(s)/kG/Min (6 mL/Hr) IV Continuous       ANEMIA PLAN:  Anemia of chronic disease:  We will continue epo aiming for a HCT of 32-36 %.   We will monitor Iron stores, B12 and RBC folate .    dvt heparin   Injectable 5000 Unit(s) SubCutaneous every 12 hours  Taken to OR for urgent left knee disarticulation for gas gangrne  POD#4

## 2023-10-18 NOTE — DISCHARGE NOTE PROVIDER - CARE PROVIDERS DIRECT ADDRESSES
,DirectAddress_Unknown ,DirectAddress_Unknown,DirectAddress_Unknown,[email or web address],DirectAddress_Unknown,DirectAddress_Unknown

## 2023-10-18 NOTE — DISCHARGE NOTE PROVIDER - HOSPITAL COURSE
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 Sin/Dr. Singh) Presents to ED [place] 10/10 with weakness for 2 weeks. 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 Wound care consult and ID consults are requested .    *****************************************THIS DOCUMENT IS INCOMPLETE*********************    Problem/Plan - 1:  ·  Problem: Sepsis.   ·  Plan: 2/2 TO Left lower extremity infected wound with concern for necrotizing fascitis , S/P  urgent left knee disarticulation   ,on iv abx  , ID and wound care consults appreciated Follow cultures ,serial cbc Taken to OR for urgent left knee disarticulation for gas gangrene  Daily dressing changes by vascular team  RTOR Thursday 10/19 for AKA closure -Medically optimized for return to OR for L AKA closure.    Problem/Plan - 2:  ·  Problem: Necrotizing fasciitis of lower leg.   ·  Plan: S/P  urgent left knee disarticulation 10/11/2023    Weaned of  pressors; midodrine added  Daily dressing changes by vascular team  RTOR 10/19 for AKA closure.    Problem/Plan - 3:  ·  Problem: Leg wound, left.   ·  Plan: Left lower extremity infected wound with concern for necrotizing faccitis , S/P  urgent left knee disarticulation s/p Hypotension requiring vasopressors ,now off  Maintain ICU care,on iv abx  , ID and wound care consults appreciated Follow cultures ,serial cbc.    Problem/Plan - 4:  ·  Problem: ESRD on hemodialysis.   ·  Plan: HD as per schedule ,case d/w nephrologist Dr Castillo.    Problem/Plan - 5:  ·  Problem: Dehydration.   ·  Plan: Admit for iv hydration,monitor renal profile and urine output,nutritionist consult,prealbumin level,serial bmp,nutritional supplements,multivitamins,palliative care evaluation regarding MOLST completion and artificial nutrition discussion.    Problem/Plan - 6:  ·  Problem: DM (diabetes mellitus), type 2.   ·  Plan: Accuchecks monitoring and insulin corrective regimen  sliding scale coverage with short acting inslulin, add longacting insulin as needed ,no concentrated sweets diet, serial labs ,HbA1C,education.    Problem/Plan - 7:  ·  Problem: HLD (hyperlipidemia).   ·  Plan: continue home medications.    Problem/Plan - 8:  ·  Problem: PVD (peripheral vascular disease).   ·  Plan: continue home medications.    Problem/Plan - 9:  ·  Problem: Prophylactic measure.   ·  Plan: Gastrointestinal stress ulcer prophylaxis and DVT prophylaxis administered.       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 Sin/Dr. Singh) Presents to ED [facility] 10/10 with weakness for 2 weeks. 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 Wound care consult and ID consults are requested    Problem/Plan - 1:  ·  Problem: Sepsis.   ·  Plan: 2/2 TO Left lower extremity infected wound with concern for necrotizing fascitis , S/P  urgent left knee disarticulation   ,on iv abx  , ID and wound care consults appreciated Follow cultures ,serial cbc Taken to OR for urgent left knee disarticulation for gas gangrene  Daily dressing changes by vascular team  RTOR Thursday 10/19 for AKA closure -Medically optimized for return to OR for L AKA closure.    Problem/Plan - 2:  ·  Problem: Necrotizing fasciitis of lower leg.   ·  Plan: S/P  urgent left knee disarticulation 10/11/2023    Weaned of  pressors; midodrine added  Daily dressing changes by vascular team  RTOR 10/19 for AKA closure.    Problem/Plan - 3:  ·  Problem: Leg wound, left.   ·  Plan: Left lower extremity infected wound with concern for necrotizing faccitis , S/P  urgent left knee disarticulation s/p Hypotension requiring vasopressors ,now off  Maintain ICU care,on iv abx  , ID and wound care consults appreciated Follow cultures ,serial cbc.    Problem/Plan - 4:  ·  Problem: ESRD on hemodialysis.   ·  Plan: HD as per schedule ,case d/w nephrologist Dr Castillo.    Problem/Plan - 5:  ·  Problem: Dehydration.   ·  Plan: Admit for iv hydration,monitor renal profile and urine output,nutritionist consult,prealbumin level,serial bmp,nutritional supplements,multivitamins,palliative care evaluation regarding MOLST completion and artificial nutrition discussion.    Problem/Plan - 6:  ·  Problem: DM (diabetes mellitus), type 2.   ·  Plan: Accuchecks monitoring and insulin corrective regimen  sliding scale coverage with short acting inslulin, add longacting insulin as needed ,no concentrated sweets diet, serial labs ,HbA1C,education.    Problem/Plan - 7:  ·  Problem: HLD (hyperlipidemia).   ·  Plan: continue home medications.    Problem/Plan - 8:  ·  Problem: PVD (peripheral vascular disease).   ·  Plan: continue home medications.    Problem/Plan - 9:  ·  Problem: Prophylactic measure.   ·  Plan: Gastrointestinal stress ulcer prophylaxis and DVT prophylaxis administered.

## 2023-10-18 NOTE — PROGRESS NOTE ADULT - SUBJECTIVE AND OBJECTIVE BOX
Date/Time Patient Seen:  		  Referring MD:   Data Reviewed	       Patient is a 48y old  Male who presents with a chief complaint of weakness (17 Oct 2023 12:30)      Subjective/HPI     PAST MEDICAL & SURGICAL HISTORY:  ESRD on dialysis    H/O hypotension    Diabetes mellitus    Leg wound, left    Steal syndrome dialysis vascular access    Gangrene of finger of right hand    PVD (peripheral vascular disease)    Anemia of chronic disease    Constipation    HLD (hyperlipidemia)    S/P BKA (below knee amputation) bilateral    AV fistula          Medication list         MEDICATIONS  (STANDING):  atorvastatin 40 milliGRAM(s) Oral at bedtime  dextrose 5%. 1000 milliLiter(s) (100 mL/Hr) IV Continuous <Continuous>  dextrose 5%. 1000 milliLiter(s) (50 mL/Hr) IV Continuous <Continuous>  dextrose 50% Injectable 12.5 Gram(s) IV Push once  dextrose 50% Injectable 25 Gram(s) IV Push once  dextrose 50% Injectable 25 Gram(s) IV Push once  dorzolamide 2%/timolol 0.5% Ophthalmic Solution 1 Drop(s) Both EYES two times a day  glucagon  Injectable 1 milliGRAM(s) IntraMuscular once  heparin   Injectable 5000 Unit(s) SubCutaneous every 12 hours  insulin glargine Injectable (LANTUS) 13 Unit(s) SubCutaneous at bedtime  insulin lispro (ADMELOG) corrective regimen sliding scale   SubCutaneous Before meals and at bedtime  insulin lispro Injectable (ADMELOG) 5 Unit(s) SubCutaneous three times a day before meals  lactobacillus acidophilus 1 Tablet(s) Oral every 12 hours  midodrine. 20 milliGRAM(s) Oral three times a day  piperacillin/tazobactam IVPB.. 3.375 Gram(s) IV Intermittent every 12 hours    MEDICATIONS  (PRN):  acetaminophen     Tablet .. 650 milliGRAM(s) Oral every 4 hours PRN Temp greater or equal to 38C (100.4F), Mild Pain (1 - 3)  aluminum hydroxide/magnesium hydroxide/simethicone Suspension 30 milliLiter(s) Oral every 4 hours PRN Dyspepsia  HYDROmorphone  Injectable 0.5 milliGRAM(s) IV Push every 4 hours PRN Severe Pain (7 - 10)  melatonin 3 milliGRAM(s) Oral at bedtime PRN Insomnia  ondansetron Injectable 4 milliGRAM(s) IV Push every 8 hours PRN Nausea and/or Vomiting  oxyCODONE    IR 10 milliGRAM(s) Oral every 6 hours PRN Moderate Pain (4 - 6)         Vitals log        ICU Vital Signs Last 24 Hrs  T(C): 36.7 (18 Oct 2023 05:11), Max: 37.3 (17 Oct 2023 15:29)  T(F): 98 (18 Oct 2023 05:11), Max: 99.1 (17 Oct 2023 15:29)  HR: 64 (18 Oct 2023 05:11) (58 - 66)  BP: 123/73 (18 Oct 2023 05:11) (107/49 - 160/80)  BP(mean): 90 (17 Oct 2023 19:00) (63 - 92)  ABP: --  ABP(mean): --  RR: 17 (18 Oct 2023 05:11) (13 - 21)  SpO2: 97% (18 Oct 2023 05:11) (96% - 100%)    O2 Parameters below as of 18 Oct 2023 05:11  Patient On (Oxygen Delivery Method): room air                 Input and Output:  I&O's Detail    16 Oct 2023 07:01  -  17 Oct 2023 07:00  --------------------------------------------------------  IN:    IV PiggyBack: 200 mL    Oral Fluid: 970 mL  Total IN: 1170 mL    OUT:    Other (mL): 0 mL    Voided (mL): 0 mL  Total OUT: 0 mL    Total NET: 1170 mL      17 Oct 2023 07:01  -  18 Oct 2023 05:23  --------------------------------------------------------  IN:    IV PiggyBack: 100 mL    Oral Fluid: 200 mL  Total IN: 300 mL    OUT:    Voided (mL): 0 mL  Total OUT: 0 mL    Total NET: 300 mL          Lab Data                        9.9    11.20 )-----------( 472      ( 17 Oct 2023 05:35 )             30.7     10-17    137  |  101  |  26<H>  ----------------------------<  261<H>  4.0   |  26  |  6.40<H>    Ca    8.4<L>      17 Oct 2023 05:35  Phos  4.2     10-17  Mg     2.4     10-17              Review of Systems	      Objective     Physical Examination    heart s1s2  lung dc BS      Pertinent Lab findings & Imaging      Barak:  NO   Adequate UO     I&O's Detail    16 Oct 2023 07:01  -  17 Oct 2023 07:00  --------------------------------------------------------  IN:    IV PiggyBack: 200 mL    Oral Fluid: 970 mL  Total IN: 1170 mL    OUT:    Other (mL): 0 mL    Voided (mL): 0 mL  Total OUT: 0 mL    Total NET: 1170 mL      17 Oct 2023 07:01  -  18 Oct 2023 05:23  --------------------------------------------------------  IN:    IV PiggyBack: 100 mL    Oral Fluid: 200 mL  Total IN: 300 mL    OUT:    Voided (mL): 0 mL  Total OUT: 0 mL    Total NET: 300 mL               Discussed with:     Cultures:	        Radiology

## 2023-10-18 NOTE — DISCHARGE NOTE PROVIDER - NSDCCAREPROVSEEN_GEN_ALL_CORE_FT
Rob Trinh, Paty Betancourt, pAoorva Woods, Deepak Valle, Reyna Kelsey, Usman Alonzo, Nii Castano, Michael Willis, Apoorva Olivas, Roxanna Breen, Joey Hatch, Wilmer Pickett, Neo Chiang, Celia Matias, Gaston

## 2023-10-18 NOTE — PROGRESS NOTE ADULT - SUBJECTIVE AND OBJECTIVE BOX
Interval History:    CENTRAL LINE:   [  ] YES       [  ] NO  FLORES:                 [  ] YES       [  ] NO         REVIEW OF SYSTEMS:  All Systems below were reviewed and are negative [  ]  HEENT:  ID:  Pulmonary:  Cardiac:  GI:  Renal:  Musculoskeletal:  All other systems above were reviewed and are negative   [  ]      MEDICATIONS  (STANDING):  atorvastatin 40 milliGRAM(s) Oral at bedtime  cefpodoxime 200 milliGRAM(s) Oral every 24 hours  dextrose 5%. 1000 milliLiter(s) (100 mL/Hr) IV Continuous <Continuous>  dextrose 5%. 1000 milliLiter(s) (50 mL/Hr) IV Continuous <Continuous>  dextrose 50% Injectable 12.5 Gram(s) IV Push once  dextrose 50% Injectable 25 Gram(s) IV Push once  dextrose 50% Injectable 25 Gram(s) IV Push once  dorzolamide 2%/timolol 0.5% Ophthalmic Solution 1 Drop(s) Both EYES two times a day  epoetin deidre (PROCRIT) Injectable 27224 Unit(s) IV Push <User Schedule>  glucagon  Injectable 1 milliGRAM(s) IntraMuscular once  heparin   Injectable 5000 Unit(s) SubCutaneous every 12 hours  insulin glargine Injectable (LANTUS) 13 Unit(s) SubCutaneous at bedtime  insulin lispro (ADMELOG) corrective regimen sliding scale   SubCutaneous Before meals and at bedtime  insulin lispro Injectable (ADMELOG) 5 Unit(s) SubCutaneous three times a day before meals  lactobacillus acidophilus 1 Tablet(s) Oral every 12 hours  midodrine. 20 milliGRAM(s) Oral three times a day    MEDICATIONS  (PRN):  acetaminophen     Tablet .. 650 milliGRAM(s) Oral every 4 hours PRN Temp greater or equal to 38C (100.4F), Mild Pain (1 - 3)  aluminum hydroxide/magnesium hydroxide/simethicone Suspension 30 milliLiter(s) Oral every 4 hours PRN Dyspepsia  HYDROmorphone  Injectable 0.5 milliGRAM(s) IV Push every 4 hours PRN Severe Pain (7 - 10)  melatonin 3 milliGRAM(s) Oral at bedtime PRN Insomnia  ondansetron Injectable 4 milliGRAM(s) IV Push every 8 hours PRN Nausea and/or Vomiting  oxyCODONE    IR 10 milliGRAM(s) Oral every 6 hours PRN Moderate Pain (4 - 6)      Vital Signs Last 24 Hrs  T(C): 37.3 (18 Oct 2023 17:17), Max: 37.3 (18 Oct 2023 17:17)  T(F): 99.1 (18 Oct 2023 17:17), Max: 99.1 (18 Oct 2023 17:17)  HR: 76 (18 Oct 2023 17:17) (61 - 76)  BP: 105/65 (18 Oct 2023 17:17) (105/65 - 160/80)  BP(mean): --  RR: 18 (18 Oct 2023 17:17) (17 - 18)  SpO2: 98% (18 Oct 2023 17:17) (97% - 100%)    Parameters below as of 18 Oct 2023 17:17  Patient On (Oxygen Delivery Method): room air        I&O's Summary    17 Oct 2023 07:01  -  18 Oct 2023 07:00  --------------------------------------------------------  IN: 300 mL / OUT: 0 mL / NET: 300 mL        PHYSICAL EXAM:  HEENT: NC/AT; PERRLA  Neck: Soft; no tenderness  Lungs: CTA bilaterally; no wheezing.   Heart:  Abdomen:  Genital/ Rectal:  Extremities:  Neurologic:  Vascular:      LABORATORY:    CBC Full  -  ( 18 Oct 2023 10:30 )  WBC Count : 11.97 K/uL  RBC Count : 2.95 M/uL  Hemoglobin : 8.7 g/dL  Hematocrit : 27.5 %  Platelet Count - Automated : 486 K/uL  Mean Cell Volume : 93.2 fl  Mean Cell Hemoglobin : 29.5 pg  Mean Cell Hemoglobin Concentration : 31.6 gm/dL  Auto Neutrophil # : 7.54 K/uL  Auto Lymphocyte # : 2.51 K/uL  Auto Monocyte # : 1.20 K/uL  Auto Eosinophil # : 0.72 K/uL  Auto Basophil # : 0.00 K/uL  Auto Neutrophil % : 63.0 %  Auto Lymphocyte % : 21.0 %  Auto Monocyte % : 10.0 %  Auto Eosinophil % : 6.0 %  Auto Basophil % : 0.0 %          10-18    137  |  101  |  39<H>  ----------------------------<  142<H>  3.7   |  22  |  9.10<H>    Ca    8.7      18 Oct 2023 10:30  Phos  5.2     10-18  Mg     2.4     10-18            Assessment and Plan:          Nii Alonzo MD   [phone].    He is afebrile  Comfortable.   No complaints.      MEDICATIONS  (STANDING):  atorvastatin 40 milliGRAM(s) Oral at bedtime  cefpodoxime 200 milliGRAM(s) Oral every 24 hours  dextrose 5%. 1000 milliLiter(s) (100 mL/Hr) IV Continuous <Continuous>  dextrose 5%. 1000 milliLiter(s) (50 mL/Hr) IV Continuous <Continuous>  dextrose 50% Injectable 12.5 Gram(s) IV Push once  dextrose 50% Injectable 25 Gram(s) IV Push once  dextrose 50% Injectable 25 Gram(s) IV Push once  dorzolamide 2%/timolol 0.5% Ophthalmic Solution 1 Drop(s) Both EYES two times a day  epoetin deidre (PROCRIT) Injectable 96138 Unit(s) IV Push <User Schedule>  glucagon  Injectable 1 milliGRAM(s) IntraMuscular once  heparin   Injectable 5000 Unit(s) SubCutaneous every 12 hours  insulin glargine Injectable (LANTUS) 13 Unit(s) SubCutaneous at bedtime  insulin lispro (ADMELOG) corrective regimen sliding scale   SubCutaneous Before meals and at bedtime  insulin lispro Injectable (ADMELOG) 5 Unit(s) SubCutaneous three times a day before meals  lactobacillus acidophilus 1 Tablet(s) Oral every 12 hours  midodrine. 20 milliGRAM(s) Oral three times a day    MEDICATIONS  (PRN):  acetaminophen     Tablet .. 650 milliGRAM(s) Oral every 4 hours PRN Temp greater or equal to 38C (100.4F), Mild Pain (1 - 3)  aluminum hydroxide/magnesium hydroxide/simethicone Suspension 30 milliLiter(s) Oral every 4 hours PRN Dyspepsia  HYDROmorphone  Injectable 0.5 milliGRAM(s) IV Push every 4 hours PRN Severe Pain (7 - 10)  melatonin 3 milliGRAM(s) Oral at bedtime PRN Insomnia  ondansetron Injectable 4 milliGRAM(s) IV Push every 8 hours PRN Nausea and/or Vomiting  oxyCODONE    IR 10 milliGRAM(s) Oral every 6 hours PRN Moderate Pain (4 - 6)      Vital Signs Last 24 Hrs  T(C): 37.3 (18 Oct 2023 17:17), Max: 37.3 (18 Oct 2023 17:17)  T(F): 99.1 (18 Oct 2023 17:17), Max: 99.1 (18 Oct 2023 17:17)  HR: 76 (18 Oct 2023 17:17) (61 - 76)  BP: 105/65 (18 Oct 2023 17:17) (105/65 - 160/80)  BP(mean): --  RR: 18 (18 Oct 2023 17:17) (17 - 18)  SpO2: 98% (18 Oct 2023 17:17) (97% - 100%)    Parameters below as of 18 Oct 2023 17:17  Patient On (Oxygen Delivery Method): room air        I&O's Summary    17 Oct 2023 07:01  -  18 Oct 2023 07:00  --------------------------------------------------------  IN: 300 mL / OUT: 0 mL / NET: 300 mL        PHYSICAL EXAM:  HEENT: NC/AT; PERRLA  Neck: Soft; no tenderness  Lungs: Coarse BS  bilaterally; no wheezing.   Heart: RRR, no murmurs.   Abdomen: Soft, no tenderness.   Genital/ Rectal: No lackey catheter.   Extremities: Left AKA stump with clean dressings. No active drainage.   Neurologic: Awake.       LABORATORY:    CBC Full  -  ( 18 Oct 2023 10:30 )  WBC Count : 11.97 K/uL  RBC Count : 2.95 M/uL  Hemoglobin : 8.7 g/dL  Hematocrit : 27.5 %  Platelet Count - Automated : 486 K/uL  Mean Cell Volume : 93.2 fl  Mean Cell Hemoglobin : 29.5 pg  Mean Cell Hemoglobin Concentration : 31.6 gm/dL  Auto Neutrophil # : 7.54 K/uL  Auto Lymphocyte # : 2.51 K/uL  Auto Monocyte # : 1.20 K/uL  Auto Eosinophil # : 0.72 K/uL  Auto Basophil # : 0.00 K/uL  Auto Neutrophil % : 63.0 %  Auto Lymphocyte % : 21.0 %  Auto Monocyte % : 10.0 %  Auto Eosinophil % : 6.0 %  Auto Basophil % : 0.0 %          10-18    137  |  101  |  39<H>  ----------------------------<  142<H>  3.7   |  22  |  9.10<H>    Ca    8.7      18 Oct 2023 10:30  Phos  5.2     10-18  Mg     2.4     10-18    Assessment and Plan:    1. Left posterior thigh infected wound with necrotizing fasciitis, s/p debridement and left AKA.   2. Sepsis, likely due to infected wound.  3. ESRD on HD  4. Bilateral BKA.    . Sepsis has resolved.   . L AKA and wound is healing well/. .   . Wound cultures grew group B Strep and E coli.   . Discontinue IV Zosyn. Add po Vantin 200 mg daily for 7 days.   . Waiting for closure L AKA stump tomorrow with surgery. Wound care as per surgery.  . Will sign off. The patient to follow with surgery.     Nii Alonzo MD   [phone].

## 2023-10-18 NOTE — PROGRESS NOTE ADULT - SUBJECTIVE AND OBJECTIVE BOX
CHIEF COMPLAINT/ REASON FOR VISIT  .. Patient was seen to address the  issue listed under PROBLEM LIST which is located toward bottom of this note     MELVI GALANEESH    PLV 3WES 366 W1    Allergies    No Known Drug Allergies  Tur[place] (Rash)    Intolerances        PAST MEDICAL & SURGICAL HISTORY:  ESRD on dialysis      H/O hypotension      Diabetes mellitus      Leg wound, left      Steal syndrome dialysis vascular access      Gangrene of finger of right hand      PVD (peripheral vascular disease)      Anemia of chronic disease      Constipation      HLD (hyperlipidemia)      S/P BKA (below knee amputation) bilateral      AV fistula          FAMILY HISTORY:      Home Medications:  Admelog 100 units/mL injectable solution: 4 unit(s) subcutaneously 3 times a day (10 Oct 2023 14:32)  amLODIPine 5 mg oral tablet: 1 tab(s) orally once a day (10 Oct 2023 14:32)  atorvastatin 40 mg oral tablet: 1 tab(s) orally once a day (10 Oct 2023 14:32)  insulin glargine 100 units/mL subcutaneous solution: 4 unit(s) subcutaneous once a day (at bedtime) (10 Oct 2023 14:32)  senna (sennosides) 8.6 mg oral tablet: 2 tab(s) orally once a day (at bedtime) (10 Oct 2023 14:32)  Velphoro 500 mg oral tablet, chewable: 3 tab(s) chewed 3 times a day (10 Oct 2023 14:32)      MEDICATIONS  (STANDING):  atorvastatin 40 milliGRAM(s) Oral at bedtime  dextrose 5%. 1000 milliLiter(s) (50 mL/Hr) IV Continuous <Continuous>  dextrose 5%. 1000 milliLiter(s) (100 mL/Hr) IV Continuous <Continuous>  dextrose 50% Injectable 25 Gram(s) IV Push once  dextrose 50% Injectable 12.5 Gram(s) IV Push once  dextrose 50% Injectable 25 Gram(s) IV Push once  dorzolamide 2%/timolol 0.5% Ophthalmic Solution 1 Drop(s) Both EYES two times a day  glucagon  Injectable 1 milliGRAM(s) IntraMuscular once  heparin   Injectable 5000 Unit(s) SubCutaneous every 12 hours  insulin glargine Injectable (LANTUS) 13 Unit(s) SubCutaneous at bedtime  insulin lispro (ADMELOG) corrective regimen sliding scale   SubCutaneous Before meals and at bedtime  insulin lispro Injectable (ADMELOG) 5 Unit(s) SubCutaneous three times a day before meals  lactobacillus acidophilus 1 Tablet(s) Oral every 12 hours  midodrine. 20 milliGRAM(s) Oral three times a day  piperacillin/tazobactam IVPB.. 3.375 Gram(s) IV Intermittent every 12 hours    MEDICATIONS  (PRN):  acetaminophen     Tablet .. 650 milliGRAM(s) Oral every 4 hours PRN Temp greater or equal to 38C (100.4F), Mild Pain (1 - 3)  aluminum hydroxide/magnesium hydroxide/simethicone Suspension 30 milliLiter(s) Oral every 4 hours PRN Dyspepsia  HYDROmorphone  Injectable 0.5 milliGRAM(s) IV Push every 4 hours PRN Severe Pain (7 - 10)  melatonin 3 milliGRAM(s) Oral at bedtime PRN Insomnia  ondansetron Injectable 4 milliGRAM(s) IV Push every 8 hours PRN Nausea and/or Vomiting  oxyCODONE    IR 10 milliGRAM(s) Oral every 6 hours PRN Moderate Pain (4 - 6)      Diet, DASH/TLC:   Sodium & Cholesterol Restricted  Consistent Carbohydrate Evening Snack  For patients receiving Renal Replacement - No Protein Restr, No Conc K, No Conc Phos, Low Sodium (RENAL)  Supplement Feeding Modality:  Oral  Nepro Cans or Servings Per Day:  1       Frequency:  Three Times a day (10-16-23 @ 14:16) [Active]          Vital Signs Last 24 Hrs  T(C): 36.7 (18 Oct 2023 05:11), Max: 37.3 (17 Oct 2023 15:29)  T(F): 98 (18 Oct 2023 05:11), Max: 99.1 (17 Oct 2023 15:29)  HR: 64 (18 Oct 2023 05:11) (58 - 66)  BP: 123/73 (18 Oct 2023 05:11) (109/52 - 160/80)  BP(mean): 90 (17 Oct 2023 19:00) (63 - 92)  RR: 17 (18 Oct 2023 05:11) (13 - 21)  SpO2: 97% (18 Oct 2023 05:11) (96% - 100%)    Parameters below as of 18 Oct 2023 05:11  Patient On (Oxygen Delivery Method): room air          10-17-23 @ 07:01  -  10-18-23 @ 07:00  --------------------------------------------------------  IN: 300 mL / OUT: 0 mL / NET: 300 mL              LABS:                        9.9    11.20 )-----------( 472      ( 17 Oct 2023 05:35 )             30.7     10-17    137  |  101  |  26<H>  ----------------------------<  261<H>  4.0   |  26  |  6.40<H>    Ca    8.4<L>      17 Oct 2023 05:35  Phos  4.2     10-17  Mg     2.4     10-17        Urinalysis Basic - ( 17 Oct 2023 05:35 )    Color: x / Appearance: x / SG: x / pH: x  Gluc: 261 mg/dL / Ketone: x  / Bili: x / Urobili: x   Blood: x / Protein: x / Nitrite: x   Leuk Esterase: x / RBC: x / WBC x   Sq Epi: x / Non Sq Epi: x / Bacteria: x            WBC:  WBC Count: 11.20 K/uL (10-17 @ 05:35)  WBC Count: 11.44 K/uL (10-16 @ 06:10)  WBC Count: 11.77 K/uL (10-15 @ 10:40)      MICROBIOLOGY:  RECENT CULTURES:  10-11 .Surgical Swab Surgical Swab Escherichia coli XXXX   Moderate Escherichia coli  Few Streptococcus agalactiae (Group B) Streptococcus agalactiae (Group B)  isolated  Group B streptococci are susceptible to ampicillin,  penicillin and cefazolin, but may be resistant to  erythromycin and clindamycin.  Rare Streptococcus mitis/oralis group "Susceptibilities not performed"  Numerous Actinomyces odontolyticus "Susceptibilities not performed"  Few Streptococcus agalactiae (Group B) isolated  Group B streptococci are susceptible to ampicillin,  penicillin and cefazolin, but may be resistant to  erythromycin and clindamycin.                    Sodium:  Sodium: 137 mmol/L (10-17 @ 05:35)  Sodium: 134 mmol/L (10-16 @ 06:10)  Sodium: 138 mmol/L (10-15 @ 10:40)      6.40 mg/dL 10-17 @ 05:35  8.10 mg/dL 10-16 @ 06:10  6.50 mg/dL 10-15 @ 10:40      Hemoglobin:  Hemoglobin: 9.9 g/dL (10-17 @ 05:35)  Hemoglobin: 9.8 g/dL (10-16 @ 06:10)  Hemoglobin: 9.5 g/dL (10-15 @ 10:40)      Platelets: Platelet Count - Automated: 472 K/uL (10-17 @ 05:35)  Platelet Count - Automated: 464 K/uL (10-16 @ 06:10)  Platelet Count - Automated: 406 K/uL (10-15 @ 10:40)          Urinalysis Basic - ( 17 Oct 2023 05:35 )    Color: x / Appearance: x / SG: x / pH: x  Gluc: 261 mg/dL / Ketone: x  / Bili: x / Urobili: x   Blood: x / Protein: x / Nitrite: x   Leuk Esterase: x / RBC: x / WBC x   Sq Epi: x / Non Sq Epi: x / Bacteria: x        RADIOLOGY & ADDITIONAL STUDIES:      MICROBIOLOGY:  RECENT CULTURES:  10-11 .Surgical Swab Surgical Swab Escherichia coli XXXX   Moderate Escherichia coli  Few Streptococcus agalactiae (Group B) Streptococcus agalactiae (Group B)  isolated  Group B streptococci are susceptible to ampicillin,  penicillin and cefazolin, but may be resistant to  erythromycin and clindamycin.  Rare Streptococcus mitis/oralis group "Susceptibilities not performed"  Numerous Actinomyces odontolyticus "Susceptibilities not performed"  Few Streptococcus agalactiae (Group B) isolated  Group B streptococci are susceptible to ampicillin,  penicillin and cefazolin, but may be resistant to  erythromycin and clindamycin.

## 2023-10-18 NOTE — PROGRESS NOTE ADULT - ASSESSMENT
REASON FOR VISIT  .. Management of problems listed below        REVIEW OF SYMPTOMS   Able to give ROS  Yes     RELIABILITY +/-   CONSTITUTIONAL Weakness Yes    ENDOCRINE  No heat or cold intolerance    ALLERGY No hives  Sore throat No stridor  RESP Shortness of breath YES   NEURO New weakness No   CARDIAC   Palpitations No         PHYSICAL EXAM    HEENT Unremarkable  atraumatic   RESP Fair air entry  Harsh breath sound   CARDIAC S1 S2 No S3     NO JVD    ABDOMEN No hepatosplenomegaly   bl bka  NO rash       GENERAL DATA .     GOC.  .. 10/10/2023 full code  ICU STAY. .. 10/11-10/18/2023   COVID. ..  scv2 10/10/2023 (-)  BEST PRACTICE ISSUES.    HOB ELEVATN. .. Yes  DVT PPLX. ..  10/10/2023 [facility]    SPEECH SWALLOW RECOMMENDATIONS.    ..        DIET.   ..  10/11 renal restrn   ..  10/10/2023 npo   IV fl ...   BOLUS.   .. 10/11/2023 4a ns 500   .. 10/10/2023 4p ns 250   .. 10/10/2023 12p ns 500  .. 10/10/2023 9a ns 1000   STRESS ULCER PPLX. ..    10/10/2023 protonix 40  INFECTION PPLX. ..   10/13 mupirocin 2%   ALLGY. ..   turkey                      WT. ..  10/10/2023 59  BMI. ..      PROCEDURES.  .. 10/11/2023 l knee disarticulation     ABGS.   .     VS/ PO/IO/ VENT/ DRIPS.  10/18/2023 76 100/60   10/18/2023 ra 98%     DOA C/C.   10/10/2023 Increasing weakness 2 weeks fevermalaise     INITIAL PRESENTATION.   . 10/10/2023 49yo male with weakness for 2 weeks. pt is dialysis patient M/W/F last dialysis was yesterday but pt has been feeling weak for the last 2 weeks, no cough, fever, chills, no vomiting or diarrhea, pt also has had an open wound under his left thigh that has been neglected for the last few weeks, draining pus and with redness  . Pulm critical care consulted as BP low     PMH.   . DM  . ESRD  . HD   . AV fistula  . BL BKA     HOME MEDS.   COURSE.     PROBLEMS/ASSESSMENT/RECOMMENDATIONS (A/R).    . Vanco level   .. 10/11-10/12-10/13-10/14/2023   .. vanco 15.6- 19.8 - 26 - 24     INFECTION.  . Skin soft tissue infection   . Necrotizing fascitis 10/10 CT   .. W 10/10-10/11-10/12-10/13-10/14-10/16/2023   .. w 19 - 17.7- 22- 13.8 - 10.9 - 11.4   .. ct lle 10/10   .... sp remote bka   .... no cortical eroison or aggressive periosteal reaction   .... deep st ulceration along post medial margin knee with surrounding st intramuscular and perifascial st emohysema tracking cranially dd necrotizing fascitis    .. Flu ab 10/10/2023 (-)  .. rsv 10/10/2023 (-)  .. MRSA 10/11 (+)   .. surg cs 10/11  .... 1) Mod e coli sens to piptaz   .... 2) few strep agalct  gp b   .. bc 10/10 (-)   .. skin cs 10/10 strept agalacticae   .. 10/10 9a vanco 1g givn   .. 10/12 vanco 750 givn   .. 10/10/2023 zosyn     CARDIAC.  .. La 10/10/2023 la 1.1   .. echo 10/16/2023   .... ef 56%   .... n lvdd   .. Monitor     .. Shock  .. 10/10/2023 5:30 PM Has been given 2l fluid challenge  .. 10/10/2023 5:30 PM ra po 99%  .. 10/10/2023 will cautiously try more fluids  .. 10/12 10a nore 8 in 250   .. 10/11 midodrine 10.3   .. 10/10/2023 If continues to have map below 65 will need icu for iv vasopressors   .. 10/13/2023 On iv nore started 10/12  Target MAP 65 (+)     HEMAT.  .. Hb 10/10-10/11-10/12-10/13-10/11-10/16-10/17/2023   .. Hb 10.3 - 9.2 - 10-10.3 - 11.1 - 9.8 - 9.9   .. Inr 10/10/2023 inr 129   .. Monitor     RENAL.  . ESRD  .. Na 10/10/2023 Na 136  .. Cr 10/10/2023 Cr 8.2   .. HD as guided by renal     . NECROTISZING FASCITIS   .. ct lle 10/10   .... sp remote bka   .... no cortical eroison or aggressive periosteal reaction   .... deep st ulceration along post medial margin knee with surrounding st intramuscular and perifascial st emohysema tracking cranially dd necrotizing fascitis    .. 10/11/2023 10:30 AM Surgery called as soon as radiologist reported NF   .. Pt is cleared for urgent surgery from [facility] standpooint   .. 10/11 l knee disarticulation done     ADMISSION SUMMARY.   . 48 m PMH esrd admitted 10/10 with shock found to have necroitizing fascitis   . Pt had l knee disarticulation done 10/11     MAIN ISSUES   . SHOCK   . SEPSIS   . SKIN SOFT TISSUE CELLULITIS  10/10 l post knee pressure ulcer   . NECROITIZING FASCITIS 10/10 L knee ct   . l KNEE DISARTICULATION 10/11   .. 10/11 surg strep agalacticae gp b e coli sens piptaz   . ESRD     OVERALL PLAN  . SSTI On vanco started 10/10 On Vanco started 10/10   . NECROTIZING FASCITIS 10/11/2023   . SHOCK 10/12-10/14/2023 Nore  Target MAP 65 (+)  . SP Urgent DISARTICULATION l knee 10/11   ..  RTOR 10/19 for AKA closure     TIME SPENT.   . Over 36 minutes aggregate care time spent on encounter; activities included   direct patient care, counseling and/or coordinating care reviewing notes, lab data/ imaging , discussion with multidisciplinary team/ patient  /family and explaining in detail risks, benefits, alternatives  of the recommendations     MICHAEL TIRADO 48 m 10/10/2023 [address] DR ELENA SCHMUTER DR MOHSEN PAHLAVAN

## 2023-10-18 NOTE — DISCHARGE NOTE PROVIDER - NSDCHHASSISTILLNESS_GEN_ALL_CORE
no contraindication to leaving home  B/L LE AMPUTATION ,FALL RISK , needs prosthetic work Refused rehabilitation facility placement

## 2023-10-18 NOTE — DISCHARGE NOTE PROVIDER - NSDCCPCAREPLAN_GEN_ALL_CORE_FT
PRINCIPAL DISCHARGE DIAGNOSIS  Diagnosis: Wound infection  Assessment and Plan of Treatment: You were admitted with left lower infected wound with necrotizing gascitis. You were urgently seen by the vascular surgeon, had left knee disarticulation for gas gangrene 10/11/23. INCOMPLETE      SECONDARY DISCHARGE DIAGNOSES  Diagnosis: ESRD on dialysis  Assessment and Plan of Treatment: Continue with your dialysis treatments. Follow with your nephrologist (kidney physician). Continue your medications as prescribed.       PRINCIPAL DISCHARGE DIAGNOSIS  Diagnosis: Wound infection  Assessment and Plan of Treatment: You were admitted with left lower infected wound with necrotizing gascitis. You were urgently seen by the vascular surgeon, had left knee disarticulation for gas gangrene 10/11/23. INCOMPLETE      SECONDARY DISCHARGE DIAGNOSES  Diagnosis: Sepsis  Assessment and Plan of Treatment: 2./2 to LLE wound ,poa    Diagnosis: Leg wound, left  Assessment and Plan of Treatment:     Diagnosis: Necrotizing fasciitis  Assessment and Plan of Treatment:     Diagnosis: Gas gangrene  Assessment and Plan of Treatment:     Diagnosis: DM (diabetes mellitus), type 2  Assessment and Plan of Treatment:     Diagnosis: PVD (peripheral vascular disease)  Assessment and Plan of Treatment:     Diagnosis: ESRD on dialysis  Assessment and Plan of Treatment: Continue with your dialysis treatments. Follow with your nephrologist (kidney physician). Continue your medications as prescribed.

## 2023-10-18 NOTE — DISCHARGE NOTE PROVIDER - CARE PROVIDER_API CALL
Paty Moss  Vascular Surgery  [address] [place] [id number]  Phone: [phone]  Fax: [phone]  Follow Up Time:    Paty Moss  Vascular Surgery  [address] [place] [address]  Phone: [phone]  Fax: [phone]  Follow Up Time: 1 week    Michael Castano  Physical/Rehab Medicine  [address] [place] [id number]  Phone: [phone]  Fax: [phone]  Follow Up Time: 1-3 days    Pahlavan, Mohsen  Nephrology  [address] [facility], [address]  [place] [address]  Phone: [phone]  Fax: [phone]  Follow Up Time: 1-3 days    Nii Alonoz  Infectious Disease  [address] [place] [address]  Phone: [phone]  Fax: [phone]  Follow Up Time: 1 week    Deepak Woods  Cardiology  [address] [facility], [address]  [place] [address]  Phone: [phone]  Fax: [phone]  Follow Up Time: 2 weeks

## 2023-10-18 NOTE — PROGRESS NOTE ADULT - ASSESSMENT
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 Sin/Dr. Singh) Presents to ED [facility] 10/10 with weakness for 2 weeks. 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 Wound care consult and ID consults are requested .

## 2023-10-18 NOTE — PROGRESS NOTE ADULT - SUBJECTIVE AND OBJECTIVE BOX
Patient is a 48y Male whom presented to the hospital with esrd on hd     PAST MEDICAL & SURGICAL HISTORY:  ESRD on dialysis      H/O hypotension      Diabetes mellitus      Leg wound, left      Steal syndrome dialysis vascular access      Gangrene of finger of right hand      PVD (peripheral vascular disease)      Anemia of chronic disease      Constipation      HLD (hyperlipidemia)      S/P BKA (below knee amputation) bilateral      AV fistula          MEDICATIONS  (STANDING):  atorvastatin 40 milliGRAM(s) Oral at bedtime  collagenase Ointment 1 Application(s) Topical daily  dextrose 5%. 1000 milliLiter(s) (50 mL/Hr) IV Continuous <Continuous>  dextrose 5%. 1000 milliLiter(s) (100 mL/Hr) IV Continuous <Continuous>  dextrose 50% Injectable 25 Gram(s) IV Push once  dextrose 50% Injectable 25 Gram(s) IV Push once  dextrose 50% Injectable 12.5 Gram(s) IV Push once  dorzolamide 2%/timolol 0.5% Ophthalmic Solution 1 Drop(s) Both EYES two times a day  glucagon  Injectable 1 milliGRAM(s) IntraMuscular once  heparin   Injectable 5000 Unit(s) SubCutaneous every 12 hours  insulin lispro (ADMELOG) corrective regimen sliding scale   SubCutaneous every 6 hours  lactobacillus acidophilus 1 Tablet(s) Oral every 12 hours  midodrine. 10 milliGRAM(s) Oral three times a day  pantoprazole    Tablet 40 milliGRAM(s) Oral before breakfast  piperacillin/tazobactam IVPB.. 3.375 Gram(s) IV Intermittent every 12 hours  senna 2 Tablet(s) Oral at bedtime      Allergies    No Known Drug Allergies  [place] (Rash)    Intolerances        SOCIAL HISTORY:  Denies ETOh,Smoking,     FAMILY HISTORY:      REVIEW OF SYSTEMS:    CONSTITUTIONAL: No weakness, fevers or chills  EYES/ENT: No visual changes;  no throat pain   NECK: No pain or stiffness  RESPIRATORY: No cough, wheezing, hemoptysis; No shortness of breath  CARDIOVASCULAR: No chest pain or palpitations  GASTROINTESTINAL: No abdominal or epigastric pain. No nausea, vomiting,     No diarrhea or constipation. No melena                                                                                   9.9    11.20 )-----------( 472      ( 17 Oct 2023 05:35 )             30.7       CBC Full  -  ( 17 Oct 2023 05:35 )  WBC Count : 11.20 K/uL  RBC Count : 3.29 M/uL  Hemoglobin : 9.9 g/dL  Hematocrit : 30.7 %  Platelet Count - Automated : 472 K/uL  Mean Cell Volume : 93.3 fl  Mean Cell Hemoglobin : 30.1 pg  Mean Cell Hemoglobin Concentration : 32.2 gm/dL  Auto Neutrophil # : 6.86 K/uL  Auto Lymphocyte # : 2.28 K/uL  Auto Monocyte # : 0.99 K/uL  Auto Eosinophil # : 0.60 K/uL  Auto Basophil # : 0.10 K/uL  Auto Neutrophil % : 61.2 %  Auto Lymphocyte % : 20.4 %  Auto Monocyte % : 8.8 %  Auto Eosinophil % : 5.4 %  Auto Basophil % : 0.9 %      10-17    137  |  101  |  26<H>  ----------------------------<  261<H>  4.0   |  26  |  6.40<H>    Ca    8.4<L>      17 Oct 2023 05:35  Phos  4.2     10-17  Mg     2.4     10-17        CAPILLARY BLOOD GLUCOSE      POCT Blood Glucose.: 187 mg/dL (18 Oct 2023 08:16)  POCT Blood Glucose.: 184 mg/dL (17 Oct 2023 21:40)  POCT Blood Glucose.: 186 mg/dL (17 Oct 2023 16:53)  POCT Blood Glucose.: 177 mg/dL (17 Oct 2023 11:04)      Vital Signs Last 24 Hrs  T(C): 36.7 (18 Oct 2023 05:11), Max: 37.3 (17 Oct 2023 15:29)  T(F): 98 (18 Oct 2023 05:11), Max: 99.1 (17 Oct 2023 15:29)  HR: 64 (18 Oct 2023 05:11) (58 - 65)  BP: 123/73 (18 Oct 2023 05:11) (118/44 - 160/80)  BP(mean): 90 (17 Oct 2023 19:00) (63 - 92)  RR: 17 (18 Oct 2023 05:11) (13 - 21)  SpO2: 97% (18 Oct 2023 05:11) (97% - 100%)    Parameters below as of 18 Oct 2023 05:11  Patient On (Oxygen Delivery Method): room air        Urinalysis Basic - ( 17 Oct 2023 05:35 )    Color: x / Appearance: x / SG: x / pH: x  Gluc: 261 mg/dL / Ketone: x  / Bili: x / Urobili: x   Blood: x / Protein: x / Nitrite: x   Leuk Esterase: x / RBC: x / WBC x   Sq Epi: x / Non Sq Epi: x / Bacteria: x                        PHYSICAL EXAM:    Constitutional: NAD  HEENT: conjunctive   clear   Neck:  No JVD  Respiratory: CTAB  Cardiovascular: S1 and S2  Gastrointestinal: BS+, soft, NT/ND  Extremities: No peripheral edema  Neurological: no focal deficits  Psychiatric: Normal mood, normal affect  : No [place]  Skin: No rashes   S/P BKA (below knee amputation) bilateral  AV fistula  LABS:

## 2023-10-19 LAB
ANION GAP SERPL CALC-SCNC: 10 MMOL/L — SIGNIFICANT CHANGE UP (ref 5–17)
ANION GAP SERPL CALC-SCNC: 10 MMOL/L — SIGNIFICANT CHANGE UP (ref 5–17)
APTT BLD: 30.9 SEC — SIGNIFICANT CHANGE UP (ref 24.5–35.6)
APTT BLD: 30.9 SEC — SIGNIFICANT CHANGE UP (ref 24.5–35.6)
BUN SERPL-MCNC: 24 MG/DL — HIGH (ref 7–23)
BUN SERPL-MCNC: 24 MG/DL — HIGH (ref 7–23)
CALCIUM SERPL-MCNC: 9.5 MG/DL — SIGNIFICANT CHANGE UP (ref 8.5–10.1)
CALCIUM SERPL-MCNC: 9.5 MG/DL — SIGNIFICANT CHANGE UP (ref 8.5–10.1)
CHLORIDE SERPL-SCNC: 99 MMOL/L — SIGNIFICANT CHANGE UP (ref 96–108)
CHLORIDE SERPL-SCNC: 99 MMOL/L — SIGNIFICANT CHANGE UP (ref 96–108)
CO2 SERPL-SCNC: 26 MMOL/L — SIGNIFICANT CHANGE UP (ref 22–31)
CO2 SERPL-SCNC: 26 MMOL/L — SIGNIFICANT CHANGE UP (ref 22–31)
CREAT SERPL-MCNC: 6.7 MG/DL — HIGH (ref 0.5–1.3)
CREAT SERPL-MCNC: 6.7 MG/DL — HIGH (ref 0.5–1.3)
EGFR: 9 ML/MIN/1.73M2 — LOW
EGFR: 9 ML/MIN/1.73M2 — LOW
GLUCOSE SERPL-MCNC: 198 MG/DL — HIGH (ref 70–99)
GLUCOSE SERPL-MCNC: 198 MG/DL — HIGH (ref 70–99)
HCT VFR BLD CALC: 33 % — LOW (ref 39–50)
HCT VFR BLD CALC: 33 % — LOW (ref 39–50)
HGB BLD-MCNC: 10.4 G/DL — LOW (ref 13–17)
HGB BLD-MCNC: 10.4 G/DL — LOW (ref 13–17)
INR BLD: 0.98 RATIO — SIGNIFICANT CHANGE UP (ref 0.85–1.18)
INR BLD: 0.98 RATIO — SIGNIFICANT CHANGE UP (ref 0.85–1.18)
MCHC RBC-ENTMCNC: 30.1 PG — SIGNIFICANT CHANGE UP (ref 27–34)
MCHC RBC-ENTMCNC: 30.1 PG — SIGNIFICANT CHANGE UP (ref 27–34)
MCHC RBC-ENTMCNC: 31.5 GM/DL — LOW (ref 32–36)
MCHC RBC-ENTMCNC: 31.5 GM/DL — LOW (ref 32–36)
MCV RBC AUTO: 95.4 FL — SIGNIFICANT CHANGE UP (ref 80–100)
MCV RBC AUTO: 95.4 FL — SIGNIFICANT CHANGE UP (ref 80–100)
NRBC # BLD: 0 /100 WBCS — SIGNIFICANT CHANGE UP (ref 0–0)
NRBC # BLD: 0 /100 WBCS — SIGNIFICANT CHANGE UP (ref 0–0)
PLATELET # BLD AUTO: 597 K/UL — HIGH (ref 150–400)
PLATELET # BLD AUTO: 597 K/UL — HIGH (ref 150–400)
POTASSIUM SERPL-MCNC: 4 MMOL/L — SIGNIFICANT CHANGE UP (ref 3.5–5.3)
POTASSIUM SERPL-MCNC: 4 MMOL/L — SIGNIFICANT CHANGE UP (ref 3.5–5.3)
POTASSIUM SERPL-SCNC: 4 MMOL/L — SIGNIFICANT CHANGE UP (ref 3.5–5.3)
POTASSIUM SERPL-SCNC: 4 MMOL/L — SIGNIFICANT CHANGE UP (ref 3.5–5.3)
PROTHROM AB SERPL-ACNC: 11.5 SEC — SIGNIFICANT CHANGE UP (ref 9.5–13)
PROTHROM AB SERPL-ACNC: 11.5 SEC — SIGNIFICANT CHANGE UP (ref 9.5–13)
RBC # BLD: 3.46 M/UL — LOW (ref 4.2–5.8)
RBC # BLD: 3.46 M/UL — LOW (ref 4.2–5.8)
RBC # FLD: 15.2 % — HIGH (ref 10.3–14.5)
RBC # FLD: 15.2 % — HIGH (ref 10.3–14.5)
SODIUM SERPL-SCNC: 135 MMOL/L — SIGNIFICANT CHANGE UP (ref 135–145)
SODIUM SERPL-SCNC: 135 MMOL/L — SIGNIFICANT CHANGE UP (ref 135–145)
WBC # BLD: 10.98 K/UL — HIGH (ref 3.8–10.5)
WBC # BLD: 10.98 K/UL — HIGH (ref 3.8–10.5)
WBC # FLD AUTO: 10.98 K/UL — HIGH (ref 3.8–10.5)
WBC # FLD AUTO: 10.98 K/UL — HIGH (ref 3.8–10.5)

## 2023-10-19 PROCEDURE — 27596 AMPUTATION FOLLOW-UP SURGERY: CPT

## 2023-10-19 DEVICE — SURGICEL NU-KNIT 6 X 9": Type: IMPLANTABLE DEVICE | Site: LEFT | Status: FUNCTIONAL

## 2023-10-19 DEVICE — CLIP APPLIER ETHICON LIGACLIP 9 3/8" SMALL: Type: IMPLANTABLE DEVICE | Site: LEFT | Status: FUNCTIONAL

## 2023-10-19 RX ORDER — LANOLIN ALCOHOL/MO/W.PET/CERES
3 CREAM (GRAM) TOPICAL AT BEDTIME
Refills: 0 | Status: DISCONTINUED | OUTPATIENT
Start: 2023-10-19 | End: 2023-10-24

## 2023-10-19 RX ORDER — ACETAMINOPHEN 500 MG
1000 TABLET ORAL ONCE
Refills: 0 | Status: COMPLETED | OUTPATIENT
Start: 2023-10-20 | End: 2023-10-20

## 2023-10-19 RX ORDER — HYDROMORPHONE HYDROCHLORIDE 2 MG/ML
0.5 INJECTION INTRAMUSCULAR; INTRAVENOUS; SUBCUTANEOUS EVERY 4 HOURS
Refills: 0 | Status: DISCONTINUED | OUTPATIENT
Start: 2023-10-19 | End: 2023-10-24

## 2023-10-19 RX ORDER — DEXTROSE 50 % IN WATER 50 %
12.5 SYRINGE (ML) INTRAVENOUS ONCE
Refills: 0 | Status: DISCONTINUED | OUTPATIENT
Start: 2023-10-19 | End: 2023-10-24

## 2023-10-19 RX ORDER — SODIUM CHLORIDE 9 MG/ML
1000 INJECTION INTRAMUSCULAR; INTRAVENOUS; SUBCUTANEOUS
Refills: 0 | Status: DISCONTINUED | OUTPATIENT
Start: 2023-10-19 | End: 2023-10-19

## 2023-10-19 RX ORDER — DORZOLAMIDE HYDROCHLORIDE TIMOLOL MALEATE 20; 5 MG/ML; MG/ML
1 SOLUTION/ DROPS OPHTHALMIC
Refills: 0 | Status: DISCONTINUED | OUTPATIENT
Start: 2023-10-19 | End: 2023-10-24

## 2023-10-19 RX ORDER — DEXTROSE 50 % IN WATER 50 %
25 SYRINGE (ML) INTRAVENOUS ONCE
Refills: 0 | Status: DISCONTINUED | OUTPATIENT
Start: 2023-10-19 | End: 2023-10-24

## 2023-10-19 RX ORDER — ACETAMINOPHEN 500 MG
650 TABLET ORAL EVERY 4 HOURS
Refills: 0 | Status: DISCONTINUED | OUTPATIENT
Start: 2023-10-19 | End: 2023-10-22

## 2023-10-19 RX ORDER — INSULIN LISPRO 100/ML
5 VIAL (ML) SUBCUTANEOUS
Refills: 0 | Status: DISCONTINUED | OUTPATIENT
Start: 2023-10-19 | End: 2023-10-24

## 2023-10-19 RX ORDER — GABAPENTIN 400 MG/1
100 CAPSULE ORAL THREE TIMES A DAY
Refills: 0 | Status: DISCONTINUED | OUTPATIENT
Start: 2023-10-19 | End: 2023-10-22

## 2023-10-19 RX ORDER — HYDROMORPHONE HYDROCHLORIDE 2 MG/ML
0.5 INJECTION INTRAMUSCULAR; INTRAVENOUS; SUBCUTANEOUS
Refills: 0 | Status: DISCONTINUED | OUTPATIENT
Start: 2023-10-19 | End: 2023-10-19

## 2023-10-19 RX ORDER — SODIUM CHLORIDE 9 MG/ML
1000 INJECTION, SOLUTION INTRAVENOUS
Refills: 0 | Status: DISCONTINUED | OUTPATIENT
Start: 2023-10-19 | End: 2023-10-19

## 2023-10-19 RX ORDER — OXYCODONE HYDROCHLORIDE 5 MG/1
10 TABLET ORAL EVERY 6 HOURS
Refills: 0 | Status: DISCONTINUED | OUTPATIENT
Start: 2023-10-19 | End: 2023-10-19

## 2023-10-19 RX ORDER — LACTOBACILLUS ACIDOPHILUS 100MM CELL
1 CAPSULE ORAL EVERY 12 HOURS
Refills: 0 | Status: DISCONTINUED | OUTPATIENT
Start: 2023-10-19 | End: 2023-10-24

## 2023-10-19 RX ORDER — ERYTHROPOIETIN 10000 [IU]/ML
10000 INJECTION, SOLUTION INTRAVENOUS; SUBCUTANEOUS
Refills: 0 | Status: DISCONTINUED | OUTPATIENT
Start: 2023-10-19 | End: 2023-10-24

## 2023-10-19 RX ORDER — ONDANSETRON 8 MG/1
4 TABLET, FILM COATED ORAL ONCE
Refills: 0 | Status: DISCONTINUED | OUTPATIENT
Start: 2023-10-19 | End: 2023-10-19

## 2023-10-19 RX ORDER — CEFPODOXIME PROXETIL 100 MG
200 TABLET ORAL EVERY 24 HOURS
Refills: 0 | Status: DISCONTINUED | OUTPATIENT
Start: 2023-10-19 | End: 2023-10-24

## 2023-10-19 RX ORDER — ACETAMINOPHEN 500 MG
1000 TABLET ORAL ONCE
Refills: 0 | Status: COMPLETED | OUTPATIENT
Start: 2023-10-19 | End: 2023-10-19

## 2023-10-19 RX ORDER — OXYCODONE HYDROCHLORIDE 5 MG/1
5 TABLET ORAL EVERY 6 HOURS
Refills: 0 | Status: DISCONTINUED | OUTPATIENT
Start: 2023-10-19 | End: 2023-10-22

## 2023-10-19 RX ORDER — ATORVASTATIN CALCIUM 80 MG/1
40 TABLET, FILM COATED ORAL AT BEDTIME
Refills: 0 | Status: DISCONTINUED | OUTPATIENT
Start: 2023-10-19 | End: 2023-10-24

## 2023-10-19 RX ORDER — GLUCAGON INJECTION, SOLUTION 0.5 MG/.1ML
1 INJECTION, SOLUTION SUBCUTANEOUS ONCE
Refills: 0 | Status: DISCONTINUED | OUTPATIENT
Start: 2023-10-19 | End: 2023-10-24

## 2023-10-19 RX ORDER — INSULIN GLARGINE 100 [IU]/ML
13 INJECTION, SOLUTION SUBCUTANEOUS AT BEDTIME
Refills: 0 | Status: DISCONTINUED | OUTPATIENT
Start: 2023-10-19 | End: 2023-10-24

## 2023-10-19 RX ORDER — INSULIN LISPRO 100/ML
VIAL (ML) SUBCUTANEOUS
Refills: 0 | Status: DISCONTINUED | OUTPATIENT
Start: 2023-10-19 | End: 2023-10-24

## 2023-10-19 RX ORDER — MIDODRINE HYDROCHLORIDE 2.5 MG/1
20 TABLET ORAL THREE TIMES A DAY
Refills: 0 | Status: DISCONTINUED | OUTPATIENT
Start: 2023-10-19 | End: 2023-10-21

## 2023-10-19 RX ORDER — SODIUM CHLORIDE 9 MG/ML
1000 INJECTION INTRAMUSCULAR; INTRAVENOUS; SUBCUTANEOUS
Refills: 0 | Status: DISCONTINUED | OUTPATIENT
Start: 2023-10-19 | End: 2023-10-20

## 2023-10-19 RX ADMIN — INSULIN GLARGINE 13 UNIT(S): 100 INJECTION, SOLUTION SUBCUTANEOUS at 22:00

## 2023-10-19 RX ADMIN — Medication 5 UNIT(S): at 17:44

## 2023-10-19 RX ADMIN — ATORVASTATIN CALCIUM 40 MILLIGRAM(S): 80 TABLET, FILM COATED ORAL at 21:59

## 2023-10-19 RX ADMIN — DORZOLAMIDE HYDROCHLORIDE TIMOLOL MALEATE 1 DROP(S): 20; 5 SOLUTION/ DROPS OPHTHALMIC at 06:05

## 2023-10-19 RX ADMIN — SODIUM CHLORIDE 35 MILLILITER(S): 9 INJECTION INTRAMUSCULAR; INTRAVENOUS; SUBCUTANEOUS at 19:04

## 2023-10-19 RX ADMIN — GABAPENTIN 100 MILLIGRAM(S): 400 CAPSULE ORAL at 21:59

## 2023-10-19 RX ADMIN — Medication 200 MILLIGRAM(S): at 06:04

## 2023-10-19 RX ADMIN — Medication 1 TABLET(S): at 17:50

## 2023-10-19 RX ADMIN — Medication 1000 MILLIGRAM(S): at 19:30

## 2023-10-19 RX ADMIN — Medication 2: at 06:03

## 2023-10-19 RX ADMIN — Medication 2: at 17:44

## 2023-10-19 RX ADMIN — HYDROMORPHONE HYDROCHLORIDE 0.5 MILLIGRAM(S): 2 INJECTION INTRAMUSCULAR; INTRAVENOUS; SUBCUTANEOUS at 15:09

## 2023-10-19 RX ADMIN — SODIUM CHLORIDE 75 MILLILITER(S): 9 INJECTION, SOLUTION INTRAVENOUS at 15:09

## 2023-10-19 RX ADMIN — OXYCODONE HYDROCHLORIDE 5 MILLIGRAM(S): 5 TABLET ORAL at 18:15

## 2023-10-19 RX ADMIN — HYDROMORPHONE HYDROCHLORIDE 0.5 MILLIGRAM(S): 2 INJECTION INTRAMUSCULAR; INTRAVENOUS; SUBCUTANEOUS at 02:42

## 2023-10-19 RX ADMIN — HYDROMORPHONE HYDROCHLORIDE 0.5 MILLIGRAM(S): 2 INJECTION INTRAMUSCULAR; INTRAVENOUS; SUBCUTANEOUS at 15:30

## 2023-10-19 RX ADMIN — DORZOLAMIDE HYDROCHLORIDE TIMOLOL MALEATE 1 DROP(S): 20; 5 SOLUTION/ DROPS OPHTHALMIC at 17:51

## 2023-10-19 RX ADMIN — Medication 5: at 21:59

## 2023-10-19 RX ADMIN — MIDODRINE HYDROCHLORIDE 20 MILLIGRAM(S): 2.5 TABLET ORAL at 17:51

## 2023-10-19 RX ADMIN — OXYCODONE HYDROCHLORIDE 5 MILLIGRAM(S): 5 TABLET ORAL at 17:50

## 2023-10-19 RX ADMIN — HYDROMORPHONE HYDROCHLORIDE 0.5 MILLIGRAM(S): 2 INJECTION INTRAMUSCULAR; INTRAVENOUS; SUBCUTANEOUS at 02:57

## 2023-10-19 RX ADMIN — Medication 400 MILLIGRAM(S): at 19:04

## 2023-10-19 RX ADMIN — MIDODRINE HYDROCHLORIDE 20 MILLIGRAM(S): 2.5 TABLET ORAL at 06:05

## 2023-10-19 NOTE — PROGRESS NOTE ADULT - ASSESSMENT
REASON FOR VISIT  .. Management of problems listed below      ROS  . Patient unable to give ROS     PHYSICAL EXAM    HEENT Unremarkable  atraumatic   RESP Fair air entry  Harsh breath sound   CARDIAC S1 S2 No S3     NO JVD    ABDOMEN No hepatosplenomegaly   BL BKA  NO rash       GENERAL DATA .     GOC.  .. 10/10/2023 full code  ICU STAY. .. 10/11-10/18/2023   COVID. ..  scv2 10/10/2023 (-)  BEST PRACTICE ISSUES.    HOB ELEVATN. .. Yes  DVT PPLX. ..  10/10/2023 [facility]    SPEECH SWALLOW RECOMMENDATIONS.    ..        DIET.   ..  10/11 renal restrn   ..  10/10/2023 npo   IV fl ...   BOLUS.   .. 10/11/2023 4a ns 500   .. 10/10/2023 4p ns 250   .. 10/10/2023 12p ns 500  .. 10/10/2023 9a ns 1000   STRESS ULCER PPLX. ..    10/10/2023 protonix 40  INFECTION PPLX. ..   10/13 mupirocin 2%   ALLGY. ..   turkey                      WT. ..  10/10/2023 59  BMI. ..      PROCEDURES.  .. 10/11/2023 l knee disarticulation     ABGS.   .     VS/ PO/IO/ VENT/ DRIPS.  10/19/2023 afeb 71 120/50   10/19/2023 ra 98%    DOA C/C.   10/10/2023 Increasing weakness 2 weeks fevermalaise     INITIAL PRESENTATION.   . 10/10/2023 47yo male with weakness for 2 weeks. pt is dialysis patient M/W/F last dialysis was yesterday but pt has been feeling weak for the last 2 weeks, no cough, fever, chills, no vomiting or diarrhea, pt also has had an open wound under his left thigh that has been neglected for the last few weeks, draining pus and with redness  . Pulm critical care consulted as BP low     PMH.   . DM  . ESRD  . HD   . AV fistula  . BL BKA     HOME MEDS.   COURSE.     PROBLEMS/ASSESSMENT/RECOMMENDATIONS (A/R).    . Vanco level   .. 10/11-10/12-10/13-10/14/2023   .. vanco 15.6- 19.8 - 26 - 24     INFECTION.  . Skin soft tissue infection   . Necrotizing fascitis 10/10 CT   .. W 10/10-10/11-10/12-10/13-10/14-10/16-10/19/2023   .. w 19 - 17.7- 22- 13.8 - 10.9 - 11.4 - 10.9   .. ct lle 10/10   .... sp remote bka   .... no cortical eroison or aggressive periosteal reaction   .... deep st ulceration along post medial margin knee with surrounding st intramuscular and perifascial st emohysema tracking cranially dd necrotizing fascitis    .. Flu ab 10/10/2023 (-)  .. rsv 10/10/2023 (-)  .. MRSA 10/11 (+)   .. surg cs 10/11  .... 1) Mod e coli sens to piptaz   .... 2) few strep agalct  gp b   .. bc 10/10 (-)   .. skin cs 10/10 strept agalacticae   .. 10/10 9a vanco 1g givn   .. 10/12 vanco 750 givn   .. 10/10-10/19/2023 zosyn   .. 10/19/2023 cefpodoxime     CARDIAC.  .. La 10/10/2023 la 1.1   .. echo 10/16/2023   .... ef 56%   .... n lvdd   .. Monitor     .. Shock  .. 10/10/2023 5:30 PM Has been given 2l fluid challenge  .. 10/10/2023 5:30 PM ra po 99%  .. 10/10/2023 will cautiously try more fluids  .. 10/12 10a nore 8 in 250   .. 10/11 midodrine 10.3   .. 10/10/2023 If continues to have map below 65 will need icu for iv vasopressors   .. 10/13/2023 On iv nore started 10/12  Target MAP 65 (+)     HEMAT.  .. Hb 10/10-10/11-10/12-10/13-10/11-10/16-10/17/2023   .. Hb 10.3 - 9.2 - 10-10.3 - 11.1 - 9.8 - 9.9   .. Inr 10/10/2023 inr 129   .. Monitor     RENAL.  . ESRD  .. Na 10/10/2023 Na 136  .. Cr 10/10/2023 Cr 8.2   .. HD as guided by renal     . NECROTISZING FASCITIS   .. ct lle 10/10   .... sp remote bka   .... no cortical eroison or aggressive periosteal reaction   .... deep st ulceration along post medial margin knee with surrounding st intramuscular and perifascial st emohysema tracking cranially dd necrotizing fascitis    .. 10/11/2023 10:30 AM Surgery called as soon as radiologist reported NF   .. Pt is cleared for urgent surgery from [facility] standoint   .. 10/11 l knee disarticulation done     SUMMARY.   . 48 m PMH esrd admitted 10/10 with shock found to have necroitizing fascitis   . Pt had l knee disarticulation done 10/11     OVERALL PLAN  . SSTI   . NECROTIZING FASCITIS 10/11/2023   .. 10/10 l post knee pressure ulcer   .. 10/11 surg strep agalacticae gp b e coli sens piptaz   .. Givn vanco started 10/10 On Vanco started 10/10   .. 10/10-10/19/2023 zosyn   .. 10/19/2023 cefpodoxime   . SHOCK  now resolvd   .. 10/26 echo ef 56% n lvdd   .. 10/12-10/14/2023 Nore    .. Target MAP 65 (+)  . SP Urgent DISARTICULATION l knee 10/11   ..  RTOR 10/19 for AKA closure     TIME SPENT.   . Over 36 minutes aggregate care time spent on encounter; activities included   direct patient care, counseling and/or coordinating care reviewing notes, lab data/ imaging , discussion with multidisciplinary team/ patient  /family and explaining in detail risks, benefits, alternatives  of the recommendations     MICHAEL TIRADO 48 m 10/10/2023 [address] DR ELENA SCHMUTER DR MOHSEN PAHLAVAN

## 2023-10-19 NOTE — PROGRESS NOTE ADULT - SUBJECTIVE AND OBJECTIVE BOX
CHIEF COMPLAINT/ REASON FOR VISIT  .. Patient was seen to address the  issue listed under PROBLEM LIST which is located toward bottom of this note     MELVI GREGORYSERA    PLV 3WES 366 W1    Allergies    No Known Drug Allergies  [place] (Rash)    Intolerances        PAST MEDICAL & SURGICAL HISTORY:  ESRD on dialysis      H/O hypotension      Diabetes mellitus      Leg wound, left      Steal syndrome dialysis vascular access      Gangrene of finger of right hand      PVD (peripheral vascular disease)      Anemia of chronic disease      Constipation      HLD (hyperlipidemia)      S/P BKA (below knee amputation) bilateral      AV fistula          FAMILY HISTORY:      Home Medications:  Admelog 100 units/mL injectable solution: 4 unit(s) subcutaneously 3 times a day (10 Oct 2023 14:32)  amLODIPine 5 mg oral tablet: 1 tab(s) orally once a day (10 Oct 2023 14:32)  atorvastatin 40 mg oral tablet: 1 tab(s) orally once a day (10 Oct 2023 14:32)  insulin glargine 100 units/mL subcutaneous solution: 4 unit(s) subcutaneous once a day (at bedtime) (10 Oct 2023 14:32)  senna (sennosides) 8.6 mg oral tablet: 2 tab(s) orally once a day (at bedtime) (10 Oct 2023 14:32)  Velphoro 500 mg oral tablet, chewable: 3 tab(s) chewed 3 times a day (10 Oct 2023 14:32)      MEDICATIONS  (STANDING):  atorvastatin 40 milliGRAM(s) Oral at bedtime  cefpodoxime 200 milliGRAM(s) Oral every 24 hours  dextrose 5%. 1000 milliLiter(s) (100 mL/Hr) IV Continuous <Continuous>  dextrose 5%. 1000 milliLiter(s) (50 mL/Hr) IV Continuous <Continuous>  dextrose 50% Injectable 25 Gram(s) IV Push once  dextrose 50% Injectable 25 Gram(s) IV Push once  dextrose 50% Injectable 12.5 Gram(s) IV Push once  dorzolamide 2%/timolol 0.5% Ophthalmic Solution 1 Drop(s) Both EYES two times a day  epoetin deidre (PROCRIT) Injectable 96305 Unit(s) IV Push <User Schedule>  glucagon  Injectable 1 milliGRAM(s) IntraMuscular once  heparin   Injectable 5000 Unit(s) SubCutaneous every 12 hours  insulin glargine Injectable (LANTUS) 13 Unit(s) SubCutaneous at bedtime  insulin lispro (ADMELOG) corrective regimen sliding scale   SubCutaneous Before meals and at bedtime  insulin lispro Injectable (ADMELOG) 5 Unit(s) SubCutaneous three times a day before meals  lactobacillus acidophilus 1 Tablet(s) Oral every 12 hours  midodrine. 20 milliGRAM(s) Oral three times a day    MEDICATIONS  (PRN):  acetaminophen     Tablet .. 650 milliGRAM(s) Oral every 4 hours PRN Temp greater or equal to 38C (100.4F), Mild Pain (1 - 3)  aluminum hydroxide/magnesium hydroxide/simethicone Suspension 30 milliLiter(s) Oral every 4 hours PRN Dyspepsia  HYDROmorphone  Injectable 0.5 milliGRAM(s) IV Push every 4 hours PRN Severe Pain (7 - 10)  melatonin 3 milliGRAM(s) Oral at bedtime PRN Insomnia  ondansetron Injectable 4 milliGRAM(s) IV Push every 8 hours PRN Nausea and/or Vomiting  oxyCODONE    IR 10 milliGRAM(s) Oral every 6 hours PRN Moderate Pain (4 - 6)      Diet, NPO:   Except Medications (10-19-23 @ 03:23) [Active]  Diet, NPO after Midnight:      NPO Start Date: 18-Oct-2023,   NPO Start Time: 23:59 (10-18-23 @ 18:57) [Active]          Vital Signs Last 24 Hrs  T(C): 36.4 (19 Oct 2023 04:47), Max: 37.3 (18 Oct 2023 17:17)  T(F): 97.6 (19 Oct 2023 04:47), Max: 99.1 (18 Oct 2023 17:17)  HR: 65 (19 Oct 2023 04:47) (62 - 76)  BP: 149/80 (19 Oct 2023 04:47) (105/65 - 149/80)  BP(mean): --  RR: 18 (19 Oct 2023 04:47) (18 - 18)  SpO2: 98% (19 Oct 2023 04:47) (96% - 100%)    Parameters below as of 19 Oct 2023 04:47  Patient On (Oxygen Delivery Method): room air                  LABS:                        10.4   10.98 )-----------( 597      ( 19 Oct 2023 07:30 )             33.0     10-18    137  |  101  |  39<H>  ----------------------------<  142<H>  3.7   |  22  |  9.10<H>    Ca    8.7      18 Oct 2023 10:30  Phos  5.2     10-18  Mg     2.4     10-18      PT/INR - ( 19 Oct 2023 07:30 )   PT: 11.5 sec;   INR: 0.98 ratio         PTT - ( 19 Oct 2023 07:30 )  PTT:30.9 sec  Urinalysis Basic - ( 18 Oct 2023 10:30 )    Color: x / Appearance: x / SG: x / pH: x  Gluc: 142 mg/dL / Ketone: x  / Bili: x / Urobili: x   Blood: x / Protein: x / Nitrite: x   Leuk Esterase: x / RBC: x / WBC x   Sq Epi: x / Non Sq Epi: x / Bacteria: x            WBC:  WBC Count: 10.98 K/uL (10-19 @ 07:30)  WBC Count: 11.97 K/uL (10-18 @ 10:30)  WBC Count: 11.20 K/uL (10-17 @ 05:35)  WBC Count: 11.44 K/uL (10-16 @ 06:10)  WBC Count: 11.77 K/uL (10-15 @ 10:40)      MICROBIOLOGY:  RECENT CULTURES:              PT/INR - ( 19 Oct 2023 07:30 )   PT: 11.5 sec;   INR: 0.98 ratio         PTT - ( 19 Oct 2023 07:30 )  PTT:30.9 sec    Sodium:  Sodium: 137 mmol/L (10-18 @ 10:30)  Sodium: 137 mmol/L (10-17 @ 05:35)  Sodium: 134 mmol/L (10-16 @ 06:10)  Sodium: 138 mmol/L (10-15 @ 10:40)      9.10 mg/dL 10-18 @ 10:30  6.40 mg/dL 10-17 @ 05:35  8.10 mg/dL 10-16 @ 06:10  6.50 mg/dL 10-15 @ 10:40      Hemoglobin:  Hemoglobin: 10.4 g/dL (10-19 @ 07:30)  Hemoglobin: 8.7 g/dL (10-18 @ 10:30)  Hemoglobin: 9.9 g/dL (10-17 @ 05:35)  Hemoglobin: 9.8 g/dL (10-16 @ 06:10)  Hemoglobin: 9.5 g/dL (10-15 @ 10:40)      Platelets: Platelet Count - Automated: 597 K/uL (10-19 @ 07:30)  Platelet Count - Automated: 486 K/uL (10-18 @ 10:30)  Platelet Count - Automated: 472 K/uL (10-17 @ 05:35)  Platelet Count - Automated: 464 K/uL (10-16 @ 06:10)  Platelet Count - Automated: 406 K/uL (10-15 @ 10:40)          Urinalysis Basic - ( 18 Oct 2023 10:30 )    Color: x / Appearance: x / SG: x / pH: x  Gluc: 142 mg/dL / Ketone: x  / Bili: x / Urobili: x   Blood: x / Protein: x / Nitrite: x   Leuk Esterase: x / RBC: x / WBC x   Sq Epi: x / Non Sq Epi: x / Bacteria: x        RADIOLOGY & ADDITIONAL STUDIES:      MICROBIOLOGY:  RECENT CULTURES:

## 2023-10-19 NOTE — PROGRESS NOTE ADULT - ASSESSMENT
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 Sin/Dr. Singh) Presents to ED [facility] 10/10 with weakness for 2 weeks. 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 Wound care consult and ID consults are requested . (10 Oct 2023 12:52)      esrd on hd for mwf   Excess fluids and waste products will be removed from your blood; your electrolytes will be balanced; your blood pressure will be controlled.    Admit for septic workup and ID evaluation,send blood and urine cx,serial lactate levels,monitor vitals closley,ivfs hydration,monitor urine output and renal profile,iv abx as per id cons  vancomycin  IVPB 500 milliGRAM(s) IV Intermittent once    BP monitoring,continue current  meds, low salt diet,followup with PMD in 1-2 weeks  midodrine. 10 milliGRAM(s) Oral three times a day  norepinephrine Infusion 0.05 MICROgram(s)/kG/Min (6 mL/Hr) IV Continuous       ANEMIA PLAN:  Anemia of chronic disease:  We will continue epo aiming for a HCT of 32-36 %.   We will monitor Iron stores, B12 and RBC folate .    dvt heparin   Injectable 5000 Unit(s) SubCutaneous every 12 hours  Taken to OR for urgent left knee disarticulation for gas gangrne  POD#4

## 2023-10-19 NOTE — PROGRESS NOTE ADULT - ASSESSMENT
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 Sin/Dr. Singh) Presents to ED [facility] 10/10 with weakness for 2 weeks. 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 Wound care consult and ID consults are requested .

## 2023-10-19 NOTE — PROGRESS NOTE ADULT - NS ATTEND OPT1A GEN_ALL_CORE
History/Exam/Medical decision making
History/Exam/Medical decision making
Medical decision making
History/Exam/Medical decision making

## 2023-10-19 NOTE — PROGRESS NOTE ADULT - ASSESSMENT
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 Sin/Dr. Singh) Presents to ED [facility] 10/10 with weakness for 2 weeks    anemia  esrd  weakness  DM  BKA  PAD  PVD  Pain    post op - Plan for AKA  on midodrine  vs noted  on ABX  on Opioids for pain  labs reviewed    full code  lives with family at home - in [facility] as per renal  pain relief  oral hygiene  skin care  wound care  assist with needs

## 2023-10-19 NOTE — BRIEF OPERATIVE NOTE - NSICDXBRIEFPREOP_GEN_ALL_CORE_FT
PRE-OP DIAGNOSIS:  Leg wound, left 11-Oct-2023 15:25:03  Andrés Hines  
PRE-OP DIAGNOSIS:  Leg wound, left 11-Oct-2023 15:25:03  Andrés Hines

## 2023-10-19 NOTE — PROGRESS NOTE ADULT - SUBJECTIVE AND OBJECTIVE BOX
Date/Time Patient Seen:  		  Referring MD:   Data Reviewed	       Patient is a 48y old  Male who presents with a chief complaint of weakness (18 Oct 2023 19:42)      Subjective/HPI     PAST MEDICAL & SURGICAL HISTORY:  ESRD on dialysis    H/O hypotension    Diabetes mellitus    Leg wound, left    Steal syndrome dialysis vascular access    Gangrene of finger of right hand    PVD (peripheral vascular disease)    Anemia of chronic disease    Constipation    HLD (hyperlipidemia)    S/P BKA (below knee amputation) bilateral    AV fistula          Medication list         MEDICATIONS  (STANDING):  atorvastatin 40 milliGRAM(s) Oral at bedtime  cefpodoxime 200 milliGRAM(s) Oral every 24 hours  dextrose 5%. 1000 milliLiter(s) (100 mL/Hr) IV Continuous <Continuous>  dextrose 5%. 1000 milliLiter(s) (50 mL/Hr) IV Continuous <Continuous>  dextrose 50% Injectable 25 Gram(s) IV Push once  dextrose 50% Injectable 25 Gram(s) IV Push once  dextrose 50% Injectable 12.5 Gram(s) IV Push once  dorzolamide 2%/timolol 0.5% Ophthalmic Solution 1 Drop(s) Both EYES two times a day  epoetin deidre (PROCRIT) Injectable 25863 Unit(s) IV Push <User Schedule>  glucagon  Injectable 1 milliGRAM(s) IntraMuscular once  heparin   Injectable 5000 Unit(s) SubCutaneous every 12 hours  insulin glargine Injectable (LANTUS) 13 Unit(s) SubCutaneous at bedtime  insulin lispro (ADMELOG) corrective regimen sliding scale   SubCutaneous Before meals and at bedtime  insulin lispro Injectable (ADMELOG) 5 Unit(s) SubCutaneous three times a day before meals  lactobacillus acidophilus 1 Tablet(s) Oral every 12 hours  midodrine. 20 milliGRAM(s) Oral three times a day    MEDICATIONS  (PRN):  acetaminophen     Tablet .. 650 milliGRAM(s) Oral every 4 hours PRN Temp greater or equal to 38C (100.4F), Mild Pain (1 - 3)  aluminum hydroxide/magnesium hydroxide/simethicone Suspension 30 milliLiter(s) Oral every 4 hours PRN Dyspepsia  HYDROmorphone  Injectable 0.5 milliGRAM(s) IV Push every 4 hours PRN Severe Pain (7 - 10)  melatonin 3 milliGRAM(s) Oral at bedtime PRN Insomnia  ondansetron Injectable 4 milliGRAM(s) IV Push every 8 hours PRN Nausea and/or Vomiting  oxyCODONE    IR 10 milliGRAM(s) Oral every 6 hours PRN Moderate Pain (4 - 6)         Vitals log        ICU Vital Signs Last 24 Hrs  T(C): 36.4 (19 Oct 2023 04:47), Max: 37.3 (18 Oct 2023 17:17)  T(F): 97.6 (19 Oct 2023 04:47), Max: 99.1 (18 Oct 2023 17:17)  HR: 65 (19 Oct 2023 04:47) (62 - 76)  BP: 149/80 (19 Oct 2023 04:47) (105/65 - 149/80)  BP(mean): --  ABP: --  ABP(mean): --  RR: 18 (19 Oct 2023 04:47) (18 - 18)  SpO2: 98% (19 Oct 2023 04:47) (96% - 100%)    O2 Parameters below as of 19 Oct 2023 04:47  Patient On (Oxygen Delivery Method): room air                 Input and Output:  I&O's Detail    17 Oct 2023 07:01  -  18 Oct 2023 07:00  --------------------------------------------------------  IN:    IV PiggyBack: 100 mL    Oral Fluid: 200 mL  Total IN: 300 mL    OUT:    Voided (mL): 0 mL  Total OUT: 0 mL    Total NET: 300 mL          Lab Data                        8.7    11.97 )-----------( 486      ( 18 Oct 2023 10:30 )             27.5     10-18    137  |  101  |  39<H>  ----------------------------<  142<H>  3.7   |  22  |  9.10<H>    Ca    8.7      18 Oct 2023 10:30  Phos  5.2     10-18  Mg     2.4     10-18              Review of Systems	      Objective     Physical Examination    heart s1s2  lung dec BS      Pertinent Lab findings & Imaging      Barak:  NO   Adequate UO     I&O's Detail    17 Oct 2023 07:01  -  18 Oct 2023 07:00  --------------------------------------------------------  IN:    IV PiggyBack: 100 mL    Oral Fluid: 200 mL  Total IN: 300 mL    OUT:    Voided (mL): 0 mL  Total OUT: 0 mL    Total NET: 300 mL               Discussed with:     Cultures:	        Radiology

## 2023-10-19 NOTE — PROGRESS NOTE ADULT - SUBJECTIVE AND OBJECTIVE BOX
PROGRESS NOTE  Patient is a 48y old  Male who presents with a chief complaint of weakness (19 Oct 2023 08:49)  Chart and available morning labs /imaging are reviewed electronically , urgent issues addressed . 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 Sin/Dr. Singh) Presents to ED [facility] 10/10 with weakness for 2 weeks. 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 Wound care consult and ID consults are requested . (10 Oct 2023 12:52)    PAST MEDICAL & SURGICAL HISTORY:  ESRD on dialysis      H/O hypotension      Diabetes mellitus      Leg wound, left      Steal syndrome dialysis vascular access      Gangrene of finger of right hand      PVD (peripheral vascular disease)      Anemia of chronic disease      Constipation      HLD (hyperlipidemia)      S/P BKA (below knee amputation) bilateral      AV fistula          MEDICATIONS  (STANDING):  atorvastatin 40 milliGRAM(s) Oral at bedtime  cefpodoxime 200 milliGRAM(s) Oral every 24 hours  dextrose 5%. 1000 milliLiter(s) (100 mL/Hr) IV Continuous <Continuous>  dextrose 5%. 1000 milliLiter(s) (50 mL/Hr) IV Continuous <Continuous>  dextrose 50% Injectable 25 Gram(s) IV Push once  dextrose 50% Injectable 25 Gram(s) IV Push once  dextrose 50% Injectable 12.5 Gram(s) IV Push once  dorzolamide 2%/timolol 0.5% Ophthalmic Solution 1 Drop(s) Both EYES two times a day  epoetin deidre (PROCRIT) Injectable 38530 Unit(s) IV Push <User Schedule>  glucagon  Injectable 1 milliGRAM(s) IntraMuscular once  heparin   Injectable 5000 Unit(s) SubCutaneous every 12 hours  insulin glargine Injectable (LANTUS) 13 Unit(s) SubCutaneous at bedtime  insulin lispro (ADMELOG) corrective regimen sliding scale   SubCutaneous Before meals and at bedtime  insulin lispro Injectable (ADMELOG) 5 Unit(s) SubCutaneous three times a day before meals  lactobacillus acidophilus 1 Tablet(s) Oral every 12 hours  midodrine. 20 milliGRAM(s) Oral three times a day  sodium chloride 0.9%. 1000 milliLiter(s) (35 mL/Hr) IV Continuous <Continuous>    MEDICATIONS  (PRN):  acetaminophen     Tablet .. 650 milliGRAM(s) Oral every 4 hours PRN Temp greater or equal to 38C (100.4F), Mild Pain (1 - 3)  aluminum hydroxide/magnesium hydroxide/simethicone Suspension 30 milliLiter(s) Oral every 4 hours PRN Dyspepsia  HYDROmorphone  Injectable 0.5 milliGRAM(s) IV Push every 4 hours PRN Severe Pain (7 - 10)  melatonin 3 milliGRAM(s) Oral at bedtime PRN Insomnia  ondansetron Injectable 4 milliGRAM(s) IV Push every 8 hours PRN Nausea and/or Vomiting  oxyCODONE    IR 10 milliGRAM(s) Oral every 6 hours PRN Moderate Pain (4 - 6)      OBJECTIVE    T(C): 36.4 (10-19-23 @ 04:47), Max: 37.3 (10-18-23 @ 17:17)  HR: 65 (10-19-23 @ 04:47) (65 - 76)  BP: 149/80 (10-19-23 @ 04:47) (105/65 - 149/80)  RR: 18 (10-19-23 @ 04:47) (18 - 18)  SpO2: 98% (10-19-23 @ 04:47) (96% - 100%)  Wt(kg): --  I&O's Summary        REVIEW OF SYSTEMS:  CONSTITUTIONAL: No fever, weight loss, or fatigue  EYES: No eye pain, visual disturbances, or discharge  ENMT:   No sinus or throat pain  NECK: No pain or stiffness  RESPIRATORY: No cough, wheezing, chills or hemoptysis; No shortness of breath  CARDIOVASCULAR: No chest pain, palpitations, dizziness, or leg swelling  GASTROINTESTINAL: No abdominal pain. No nausea, vomiting; No diarrhea or constipation. No melena or hematochezia.  GENITOURINARY: No dysuria, frequency, hematuria, or incontinence  NEUROLOGICAL: No headaches, memory loss, loss of strength, numbness, or tremors  SKIN: No itching, burning, rashes, or lesions   MUSCULOSKELETAL: No joint pain or swelling; No muscle, back, or extremity pain    PHYSICAL EXAM:  Appearance: NAD. VS past 24 hrs -as above   HEENT:   Moist oral mucosa. Conjunctiva clear b/l.   Neck : supple  Respiratory: Lungs CTAB.  Gastrointestinal:  Soft, nontender. No rebound. No rigidity. BS present	  Cardiovascular: RRR ,S1S2 present  Neurologic: Non-focal. Moving all extremities.  Extremities: No edema. No erythema. No calf tenderness.  Skin: No rashes, No ecchymoses, No cyanosis.	  wounds ,skin lesions-See skin assesment flow sheet   LABS:                        10.4   10.98 )-----------( 597      ( 19 Oct 2023 07:30 )             33.0     10-19    135  |  99  |  24<H>  ----------------------------<  198<H>  4.0   |  26  |  6.70<H>    Ca    9.5      19 Oct 2023 07:30  Phos  5.2     10-18  Mg     2.4     10-18      CAPILLARY BLOOD GLUCOSE      POCT Blood Glucose.: 206 mg/dL (19 Oct 2023 05:53)  POCT Blood Glucose.: 128 mg/dL (18 Oct 2023 21:04)  POCT Blood Glucose.: 213 mg/dL (18 Oct 2023 17:11)  POCT Blood Glucose.: 122 mg/dL (18 Oct 2023 14:11)  POCT Blood Glucose.: 134 mg/dL (18 Oct 2023 11:44)    PT/INR - ( 19 Oct 2023 07:30 )   PT: 11.5 sec;   INR: 0.98 ratio         PTT - ( 19 Oct 2023 07:30 )  PTT:30.9 sec  Urinalysis Basic - ( 19 Oct 2023 07:30 )    Color: x / Appearance: x / SG: x / pH: x  Gluc: 198 mg/dL / Ketone: x  / Bili: x / Urobili: x   Blood: x / Protein: x / Nitrite: x   Leuk Esterase: x / RBC: x / WBC x   Sq Epi: x / Non Sq Epi: x / Bacteria: x        Culture - Surgical Swab (collected 11 Oct 2023 13:51)  Source: .Surgical Swab Surgical Swab  Final Report (14 Oct 2023 15:54):    Moderate Escherichia coli    Few Streptococcus agalactiae (Group B) Streptococcus agalactiae (Group B)    isolated    Group B streptococci are susceptible to ampicillin,    penicillin and cefazolin, but may be resistant to    erythromycin and clindamycin.    Rare Streptococcus mitis/oralis group "Susceptibilities not performed"    Numerous Actinomyces odontolyticus "Susceptibilities not performed"    Few Streptococcus agalactiae (Group B) isolated    Group B streptococci are susceptible to ampicillin,    penicillin and cefazolin, but may be resistant to    erythromycin and clindamycin.  Organism: Escherichia coli (14 Oct 2023 15:54)  Organism: Escherichia coli (14 Oct 2023 15:54)    Culture - Blood (collected 10 Oct 2023 12:00)  Source: .Blood Blood-Peripheral  Final Report (15 Oct 2023 19:00):    No growth at 5 days    Culture - Blood (collected 10 Oct 2023 11:15)  Source: .Blood Blood-Peripheral  Final Report (15 Oct 2023 16:00):    No growth at 5 days    Culture - Other (collected 10 Oct 2023 10:30)  Source: Skin Skin  Final Report (13 Oct 2023 15:09):    Culture yields growth of greater than 3 colony types of    bacteria,  which may indicate contamination and normal yoana    Call client services within 7 days if further workup is clinically    indicated. Culture includes    Numerous Streptococcus agalactiae (Group B) isolated    Group B streptococci are susceptible to ampicillin,    penicillin and cefazolin, but may be resistant to    erythromycin and clindamycin.    Numerous Escherichia coli  Organism: Escherichia coli (13 Oct 2023 15:09)  Organism: Escherichia coli (13 Oct 2023 15:09)      RADIOLOGY & ADDITIONAL TESTS:   reviewed elctronically  ASSESSMENT/PLAN:

## 2023-10-19 NOTE — PROGRESS NOTE ADULT - NSPROGADDITIONALINFOA_GEN_ALL_CORE
Medically optimized for return to OR for L AKA closure
Patient is medically optimized for urgent surgery ,discussed with consultants and med staff.
Medically optimized for return to OR for L AKA closure

## 2023-10-19 NOTE — PROGRESS NOTE ADULT - SUBJECTIVE AND OBJECTIVE BOX
Patient is a 48y old  Male who presents with a chief complaint of weakness (19 Oct 2023 11:15)    No complaints offered  NPO confirmed  Afebrile    MEDICATIONS  (STANDING):  atorvastatin 40 milliGRAM(s) Oral at bedtime  cefpodoxime 200 milliGRAM(s) Oral every 24 hours  dextrose 5%. 1000 milliLiter(s) (100 mL/Hr) IV Continuous <Continuous>  dextrose 5%. 1000 milliLiter(s) (50 mL/Hr) IV Continuous <Continuous>  dextrose 50% Injectable 25 Gram(s) IV Push once  dextrose 50% Injectable 25 Gram(s) IV Push once  dextrose 50% Injectable 12.5 Gram(s) IV Push once  dorzolamide 2%/timolol 0.5% Ophthalmic Solution 1 Drop(s) Both EYES two times a day  epoetin deidre (PROCRIT) Injectable 41586 Unit(s) IV Push <User Schedule>  glucagon  Injectable 1 milliGRAM(s) IntraMuscular once  heparin   Injectable 5000 Unit(s) SubCutaneous every 12 hours  insulin glargine Injectable (LANTUS) 13 Unit(s) SubCutaneous at bedtime  insulin lispro (ADMELOG) corrective regimen sliding scale   SubCutaneous Before meals and at bedtime  insulin lispro Injectable (ADMELOG) 5 Unit(s) SubCutaneous three times a day before meals  lactobacillus acidophilus 1 Tablet(s) Oral every 12 hours  midodrine. 20 milliGRAM(s) Oral three times a day  sodium chloride 0.9%. 1000 milliLiter(s) (35 mL/Hr) IV Continuous <Continuous>    MEDICATIONS  (PRN):  acetaminophen     Tablet .. 650 milliGRAM(s) Oral every 4 hours PRN Temp greater or equal to 38C (100.4F), Mild Pain (1 - 3)  aluminum hydroxide/magnesium hydroxide/simethicone Suspension 30 milliLiter(s) Oral every 4 hours PRN Dyspepsia  HYDROmorphone  Injectable 0.5 milliGRAM(s) IV Push every 4 hours PRN Severe Pain (7 - 10)  melatonin 3 milliGRAM(s) Oral at bedtime PRN Insomnia  ondansetron Injectable 4 milliGRAM(s) IV Push every 8 hours PRN Nausea and/or Vomiting  oxyCODONE    IR 10 milliGRAM(s) Oral every 6 hours PRN Moderate Pain (4 - 6)    Allergies  No Known Drug Allergies  [place] (Rash)      Vital Signs Last 24 Hrs  T(C): 36.4 (19 Oct 2023 04:47), Max: 37.3 (18 Oct 2023 17:17)  T(F): 97.6 (19 Oct 2023 04:47), Max: 99.1 (18 Oct 2023 17:17)  HR: 65 (19 Oct 2023 04:47) (65 - 76)  BP: 149/80 (19 Oct 2023 04:47) (105/65 - 149/80)  BP(mean): --  RR: 18 (19 Oct 2023 04:47) (18 - 18)  SpO2: 98% (19 Oct 2023 04:47) (96% - 100%)    Parameters below as of 19 Oct 2023 04:47  Patient On (Oxygen Delivery Method): room air      I&O's Detail    Physical Exam:  General: NAD, resting comfortably in bed  Pulmonary: Nonlabored breathing, no respiratory distress, CTA  Cardiovascular: NSR  Abdominal: soft, NT/ND  Extremities: L stump dressing CDI    LABS:                        10.4   10.98 )-----------( 597      ( 19 Oct 2023 07:30 )             33.0     10-19    135  |  99  |  24<H>  ----------------------------<  198<H>  4.0   |  26  |  6.70<H>    Ca    9.5      19 Oct 2023 07:30  Phos  5.2     10-18  Mg     2.4     10-18      PT/INR - ( 19 Oct 2023 07:30 )   PT: 11.5 sec;   INR: 0.98 ratio    PTT - ( 19 Oct 2023 07:30 )  PTT:30.9 sec    CAPILLARY BLOOD GLUCOSE  POCT Blood Glucose.: 206 mg/dL (19 Oct 2023 05:53)  POCT Blood Glucose.: 128 mg/dL (18 Oct 2023 21:04)  POCT Blood Glucose.: 213 mg/dL (18 Oct 2023 17:11)  POCT Blood Glucose.: 122 mg/dL (18 Oct 2023 14:11)  POCT Blood Glucose.: 134 mg/dL (18 Oct 2023 11:44)    RECENT CULTURES:        Radiology and Additional Studies:

## 2023-10-19 NOTE — PROGRESS NOTE ADULT - ASSESSMENT
49 yo male with multiple comorbidities and bilateral BKA was consulted for necrotizing fascitis    Taken to OR for urgent left knee disarticulation for gas gangrne  POD #8  Hypotension requiring vasopressors; now off  Downgraded to floor yesterday  Stable WBC  Cont abx as per ID  Had HD yesterday  RTOR today for formal AKA  Medical clearance and cardiac risk assessment appreciated  NPO confirmed

## 2023-10-19 NOTE — BRIEF OPERATIVE NOTE - NSICDXBRIEFPOSTOP_GEN_ALL_CORE_FT
POST-OP DIAGNOSIS:  Leg wound, left 11-Oct-2023 15:25:11  Andrés Hines  
POST-OP DIAGNOSIS:  Leg wound, left 11-Oct-2023 15:25:11  Andrés Hines

## 2023-10-19 NOTE — PROGRESS NOTE ADULT - SUBJECTIVE AND OBJECTIVE BOX
Patient is a 48y Male whom presented to the hospital with esrd on hd     PAST MEDICAL & SURGICAL HISTORY:  ESRD on dialysis      H/O hypotension      Diabetes mellitus      Leg wound, left      Steal syndrome dialysis vascular access      Gangrene of finger of right hand      PVD (peripheral vascular disease)      Anemia of chronic disease      Constipation      HLD (hyperlipidemia)      S/P BKA (below knee amputation) bilateral      AV fistula          MEDICATIONS  (STANDING):  atorvastatin 40 milliGRAM(s) Oral at bedtime  collagenase Ointment 1 Application(s) Topical daily  dextrose 5%. 1000 milliLiter(s) (50 mL/Hr) IV Continuous <Continuous>  dextrose 5%. 1000 milliLiter(s) (100 mL/Hr) IV Continuous <Continuous>  dextrose 50% Injectable 25 Gram(s) IV Push once  dextrose 50% Injectable 25 Gram(s) IV Push once  dextrose 50% Injectable 12.5 Gram(s) IV Push once  dorzolamide 2%/timolol 0.5% Ophthalmic Solution 1 Drop(s) Both EYES two times a day  glucagon  Injectable 1 milliGRAM(s) IntraMuscular once  heparin   Injectable 5000 Unit(s) SubCutaneous every 12 hours  insulin lispro (ADMELOG) corrective regimen sliding scale   SubCutaneous every 6 hours  lactobacillus acidophilus 1 Tablet(s) Oral every 12 hours  midodrine. 10 milliGRAM(s) Oral three times a day  pantoprazole    Tablet 40 milliGRAM(s) Oral before breakfast  piperacillin/tazobactam IVPB.. 3.375 Gram(s) IV Intermittent every 12 hours  senna 2 Tablet(s) Oral at bedtime      Allergies    No Known Drug Allergies  [place] (Rash)    Intolerances        SOCIAL HISTORY:  Denies ETOh,Smoking,     FAMILY HISTORY:      REVIEW OF SYSTEMS:    CONSTITUTIONAL: No weakness, fevers or chills  EYES/ENT: No visual changes;  no throat pain   NECK: No pain or stiffness  RESPIRATORY: No cough, wheezing, hemoptysis; No shortness of breath  CARDIOVASCULAR: No chest pain or palpitations  GASTROINTESTINAL: No abdominal or epigastric pain. No nausea, vomiting,     No diarrhea or constipation. No melena                                                                                                10.4   10.98 )-----------( 597      ( 19 Oct 2023 07:30 )             33.0       CBC Full  -  ( 19 Oct 2023 07:30 )  WBC Count : 10.98 K/uL  RBC Count : 3.46 M/uL  Hemoglobin : 10.4 g/dL  Hematocrit : 33.0 %  Platelet Count - Automated : 597 K/uL  Mean Cell Volume : 95.4 fl  Mean Cell Hemoglobin : 30.1 pg  Mean Cell Hemoglobin Concentration : 31.5 gm/dL  Auto Neutrophil # : x  Auto Lymphocyte # : x  Auto Monocyte # : x  Auto Eosinophil # : x  Auto Basophil # : x  Auto Neutrophil % : x  Auto Lymphocyte % : x  Auto Monocyte % : x  Auto Eosinophil % : x  Auto Basophil % : x      10-19    135  |  99  |  24<H>  ----------------------------<  198<H>  4.0   |  26  |  6.70<H>    Ca    9.5      19 Oct 2023 07:30  Phos  5.2     10-18  Mg     2.4     10-18        CAPILLARY BLOOD GLUCOSE      POCT Blood Glucose.: 163 mg/dL (19 Oct 2023 15:02)  POCT Blood Glucose.: 167 mg/dL (19 Oct 2023 12:00)  POCT Blood Glucose.: 206 mg/dL (19 Oct 2023 05:53)  POCT Blood Glucose.: 128 mg/dL (18 Oct 2023 21:04)      Vital Signs Last 24 Hrs  T(C): 36.6 (19 Oct 2023 16:36), Max: 37.2 (19 Oct 2023 03:24)  T(F): 97.8 (19 Oct 2023 16:36), Max: 99 (19 Oct 2023 03:24)  HR: 70 (19 Oct 2023 16:36) (65 - 71)  BP: 153/80 (19 Oct 2023 16:36) (109/68 - 161/76)  BP(mean): --  RR: 18 (19 Oct 2023 16:36) (15 - 20)  SpO2: 98% (19 Oct 2023 16:36) (96% - 100%)    Parameters below as of 19 Oct 2023 16:36  Patient On (Oxygen Delivery Method): room air        Urinalysis Basic - ( 19 Oct 2023 07:30 )    Color: x / Appearance: x / SG: x / pH: x  Gluc: 198 mg/dL / Ketone: x  / Bili: x / Urobili: x   Blood: x / Protein: x / Nitrite: x   Leuk Esterase: x / RBC: x / WBC x   Sq Epi: x / Non Sq Epi: x / Bacteria: x        PT/INR - ( 19 Oct 2023 07:30 )   PT: 11.5 sec;   INR: 0.98 ratio         PTT - ( 19 Oct 2023 07:30 )  PTT:30.9 sec                PHYSICAL EXAM:    Constitutional: NAD  HEENT: conjunctive   clear   Neck:  No JVD  Respiratory: CTAB  Cardiovascular: S1 and S2  Gastrointestinal: BS+, soft, NT/ND  Extremities: No peripheral edema  Neurological: no focal deficits  Psychiatric: Normal mood, normal affect  : No [place]  Skin: No rashes   S/P BKA (below knee amputation) bilateral  AV fistula  LABS:

## 2023-10-19 NOTE — BRIEF OPERATIVE NOTE - NSICDXBRIEFPROCEDURE_GEN_ALL_CORE_FT
PROCEDURES:  Above knee amputation 19-Oct-2023 14:53:43  Celia Chiang  
PROCEDURES:  Knee disarticulation 11-Oct-2023 15:23:15  Andrés Hines

## 2023-10-19 NOTE — CHART NOTE - NSCHARTNOTEFT_GEN_A_CORE
Postop Check    STATUS POST:  L AKA    POST OPERATIVE DAY #: 0    SUBJECTIVE:  Patient examined in bed resting in bed  Patient states he is in pain after his surgery  Hasn't attempted PO yet    Denies fever, chills, chest pain, palpitations, SOB    MEDICATIONS  (STANDING):  atorvastatin 40 milliGRAM(s) Oral at bedtime  cefpodoxime 200 milliGRAM(s) Oral every 24 hours  dextrose 50% Injectable 25 Gram(s) IV Push once  dextrose 50% Injectable 12.5 Gram(s) IV Push once  dorzolamide 2%/timolol 0.5% Ophthalmic Solution 1 Drop(s) Both EYES two times a day  epoetin deidre (PROCRIT) Injectable 06142 Unit(s) IV Push <User Schedule>  glucagon  Injectable 1 milliGRAM(s) IntraMuscular once  insulin glargine Injectable (LANTUS) 13 Unit(s) SubCutaneous at bedtime  insulin lispro (ADMELOG) corrective regimen sliding scale   SubCutaneous Before meals and at bedtime  insulin lispro Injectable (ADMELOG) 5 Unit(s) SubCutaneous three times a day before meals  lactated ringers. 1000 milliLiter(s) (75 mL/Hr) IV Continuous <Continuous>  lactobacillus acidophilus 1 Tablet(s) Oral every 12 hours  midodrine. 20 milliGRAM(s) Oral three times a day  oxyCODONE    IR 5 milliGRAM(s) Oral every 6 hours  sodium chloride 0.9%. 1000 milliLiter(s) (35 mL/Hr) IV Continuous <Continuous>    MEDICATIONS  (PRN):  acetaminophen     Tablet .. 650 milliGRAM(s) Oral every 4 hours PRN Temp greater or equal to 38C (100.4F), Mild Pain (1 - 3)  aluminum hydroxide/magnesium hydroxide/simethicone Suspension 30 milliLiter(s) Oral every 4 hours PRN Dyspepsia  HYDROmorphone  Injectable 0.5 milliGRAM(s) IV Push every 4 hours PRN Severe Pain (7 - 10)  melatonin 3 milliGRAM(s) Oral at bedtime PRN Insomnia  ondansetron Injectable 4 milliGRAM(s) IV Push once PRN Nausea and/or Vomiting      Vital Signs Last 24 Hrs  T(C): 36.6 (19 Oct 2023 16:36), Max: 37.2 (19 Oct 2023 03:24)  T(F): 97.8 (19 Oct 2023 16:36), Max: 99 (19 Oct 2023 03:24)  HR: 70 (19 Oct 2023 16:36) (65 - 71)  BP: 153/80 (19 Oct 2023 16:36) (109/68 - 161/76)  BP(mean): --  RR: 18 (19 Oct 2023 16:36) (15 - 20)  SpO2: 98% (19 Oct 2023 16:36) (96% - 100%)    Parameters below as of 19 Oct 2023 16:36  Patient On (Oxygen Delivery Method): room air        PHYSICAL EXAM:  General: NAD, resting comfortably in bed  Pulmonary: Nonlabored breathing, no respiratory distress  Cardiovascular: Regular rate  Abdominal: soft, NT/ND  Extremities: L stump AKA site dressed with ace wrap w/o strikethrough          I&O's Detail      LABS:                        10.4   10.98 )-----------( 597      ( 19 Oct 2023 07:30 )             33.0     10-19    135  |  99  |  24<H>  ----------------------------<  198<H>  4.0   |  26  |  6.70<H>    Ca    9.5      19 Oct 2023 07:30  Phos  5.2     10-18  Mg     2.4     10-18      PT/INR - ( 19 Oct 2023 07:30 )   PT: 11.5 sec;   INR: 0.98 ratio         PTT - ( 19 Oct 2023 07:30 )  PTT:30.9 sec  Urinalysis Basic - ( 19 Oct 2023 07:30 )    Color: x / Appearance: x / SG: x / pH: x  Gluc: 198 mg/dL / Ketone: x  / Bili: x / Urobili: x   Blood: x / Protein: x / Nitrite: x   Leuk Esterase: x / RBC: x / WBC x   Sq Epi: x / Non Sq Epi: x / Bacteria: x        RADIOLOGY & ADDITIONAL STUDIES:    ASSESSMENT AND PLAN:  49 yo male with multiple comorbidities and bilateral BKA was consulted for necrotizing fascitis  Taken to completion left AKA for  gas gangrene, POD #0    - Ofirmev RTC with PRNs  - DASH/RTC diet  - PT/OT/PM&R reevaluation  - Continue Vantin  - Dressing change in 3 days

## 2023-10-20 RX ORDER — HEPARIN SODIUM 5000 [USP'U]/ML
5000 INJECTION INTRAVENOUS; SUBCUTANEOUS EVERY 12 HOURS
Refills: 0 | Status: DISCONTINUED | OUTPATIENT
Start: 2023-10-20 | End: 2023-10-24

## 2023-10-20 RX ADMIN — Medication 400 MILLIGRAM(S): at 00:05

## 2023-10-20 RX ADMIN — Medication 1000 MILLIGRAM(S): at 00:35

## 2023-10-20 RX ADMIN — MIDODRINE HYDROCHLORIDE 20 MILLIGRAM(S): 2.5 TABLET ORAL at 12:00

## 2023-10-20 RX ADMIN — Medication 1 TABLET(S): at 06:20

## 2023-10-20 RX ADMIN — HYDROMORPHONE HYDROCHLORIDE 0.5 MILLIGRAM(S): 2 INJECTION INTRAMUSCULAR; INTRAVENOUS; SUBCUTANEOUS at 18:46

## 2023-10-20 RX ADMIN — OXYCODONE HYDROCHLORIDE 5 MILLIGRAM(S): 5 TABLET ORAL at 14:17

## 2023-10-20 RX ADMIN — HYDROMORPHONE HYDROCHLORIDE 0.5 MILLIGRAM(S): 2 INJECTION INTRAMUSCULAR; INTRAVENOUS; SUBCUTANEOUS at 19:10

## 2023-10-20 RX ADMIN — Medication 5 UNIT(S): at 17:28

## 2023-10-20 RX ADMIN — HYDROMORPHONE HYDROCHLORIDE 0.5 MILLIGRAM(S): 2 INJECTION INTRAMUSCULAR; INTRAVENOUS; SUBCUTANEOUS at 04:46

## 2023-10-20 RX ADMIN — Medication 1 TABLET(S): at 17:27

## 2023-10-20 RX ADMIN — MIDODRINE HYDROCHLORIDE 20 MILLIGRAM(S): 2.5 TABLET ORAL at 17:27

## 2023-10-20 RX ADMIN — HYDROMORPHONE HYDROCHLORIDE 0.5 MILLIGRAM(S): 2 INJECTION INTRAMUSCULAR; INTRAVENOUS; SUBCUTANEOUS at 04:36

## 2023-10-20 RX ADMIN — HEPARIN SODIUM 5000 UNIT(S): 5000 INJECTION INTRAVENOUS; SUBCUTANEOUS at 17:27

## 2023-10-20 RX ADMIN — GABAPENTIN 100 MILLIGRAM(S): 400 CAPSULE ORAL at 14:17

## 2023-10-20 RX ADMIN — OXYCODONE HYDROCHLORIDE 5 MILLIGRAM(S): 5 TABLET ORAL at 00:05

## 2023-10-20 RX ADMIN — Medication 400 MILLIGRAM(S): at 06:20

## 2023-10-20 RX ADMIN — Medication 5: at 17:28

## 2023-10-20 RX ADMIN — GABAPENTIN 100 MILLIGRAM(S): 400 CAPSULE ORAL at 22:00

## 2023-10-20 RX ADMIN — Medication 4: at 08:51

## 2023-10-20 RX ADMIN — GABAPENTIN 100 MILLIGRAM(S): 400 CAPSULE ORAL at 06:21

## 2023-10-20 RX ADMIN — Medication 5 UNIT(S): at 08:50

## 2023-10-20 RX ADMIN — OXYCODONE HYDROCHLORIDE 5 MILLIGRAM(S): 5 TABLET ORAL at 17:56

## 2023-10-20 RX ADMIN — SODIUM CHLORIDE 35 MILLILITER(S): 9 INJECTION INTRAMUSCULAR; INTRAVENOUS; SUBCUTANEOUS at 06:22

## 2023-10-20 RX ADMIN — ERYTHROPOIETIN 10000 UNIT(S): 10000 INJECTION, SOLUTION INTRAVENOUS; SUBCUTANEOUS at 10:53

## 2023-10-20 RX ADMIN — OXYCODONE HYDROCHLORIDE 5 MILLIGRAM(S): 5 TABLET ORAL at 17:26

## 2023-10-20 RX ADMIN — DORZOLAMIDE HYDROCHLORIDE TIMOLOL MALEATE 1 DROP(S): 20; 5 SOLUTION/ DROPS OPHTHALMIC at 06:22

## 2023-10-20 RX ADMIN — INSULIN GLARGINE 13 UNIT(S): 100 INJECTION, SOLUTION SUBCUTANEOUS at 22:01

## 2023-10-20 RX ADMIN — OXYCODONE HYDROCHLORIDE 5 MILLIGRAM(S): 5 TABLET ORAL at 06:20

## 2023-10-20 RX ADMIN — OXYCODONE HYDROCHLORIDE 5 MILLIGRAM(S): 5 TABLET ORAL at 14:47

## 2023-10-20 RX ADMIN — DORZOLAMIDE HYDROCHLORIDE TIMOLOL MALEATE 1 DROP(S): 20; 5 SOLUTION/ DROPS OPHTHALMIC at 17:27

## 2023-10-20 RX ADMIN — MIDODRINE HYDROCHLORIDE 20 MILLIGRAM(S): 2.5 TABLET ORAL at 06:21

## 2023-10-20 RX ADMIN — OXYCODONE HYDROCHLORIDE 5 MILLIGRAM(S): 5 TABLET ORAL at 06:50

## 2023-10-20 RX ADMIN — ATORVASTATIN CALCIUM 40 MILLIGRAM(S): 80 TABLET, FILM COATED ORAL at 22:01

## 2023-10-20 RX ADMIN — Medication 200 MILLIGRAM(S): at 06:23

## 2023-10-20 RX ADMIN — Medication 4: at 22:00

## 2023-10-20 RX ADMIN — Medication 1000 MILLIGRAM(S): at 06:50

## 2023-10-20 RX ADMIN — OXYCODONE HYDROCHLORIDE 5 MILLIGRAM(S): 5 TABLET ORAL at 00:35

## 2023-10-20 NOTE — SOCIAL WORK PROGRESS NOTE - NSSWPROGRESSNOTE_GEN_ALL_CORE
SW spoke with PT- pt again refused to work with PT, need to have pt seen to determine plan, pt prefers to return home but cannot discuss safe dc plan until pt is seen. LEN was requested, will need PT eval, and Auth prior to DC if for ALDAIR. SW to follow.

## 2023-10-20 NOTE — PROGRESS NOTE ADULT - ASSESSMENT
49 yo male with multiple comorbidities and bilateral BKA was consulted for necrotizing fascitis    Taken to OR for urgent left knee disarticulation for gas gangrne  POD #9  Now S/P L AKA          POD#1  Pain controlled with current regimen  DM management  DVT ppx  PT/OT  Daily dressing changes with DSD  HD as per renal  Pt stable for discharge from a vascular perspective

## 2023-10-20 NOTE — PROGRESS NOTE ADULT - SUBJECTIVE AND OBJECTIVE BOX
PROGRESS NOTE  Patient is a 48y old  Male who presents with a chief complaint of weakness (20 Oct 2023 09:10)    Chart and available morning labs /imaging are reviewed electronically , urgent issues addressed . 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 Sin/Dr. Singh) Presents to ED [facility] 10/10 with weakness for 2 weeks. 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 Wound care consult and ID consults are requested . (10 Oct 2023 12:52)    PAST MEDICAL & SURGICAL HISTORY:  ESRD on dialysis      H/O hypotension      Diabetes mellitus      Leg wound, left      Steal syndrome dialysis vascular access      Gangrene of finger of right hand      PVD (peripheral vascular disease)      Anemia of chronic disease      Constipation      HLD (hyperlipidemia)      S/P BKA (below knee amputation) bilateral      AV fistula          MEDICATIONS  (STANDING):  acetaminophen   IVPB .. 1000 milliGRAM(s) IV Intermittent once  atorvastatin 40 milliGRAM(s) Oral at bedtime  cefpodoxime 200 milliGRAM(s) Oral every 24 hours  dextrose 50% Injectable 25 Gram(s) IV Push once  dextrose 50% Injectable 12.5 Gram(s) IV Push once  dorzolamide 2%/timolol 0.5% Ophthalmic Solution 1 Drop(s) Both EYES two times a day  epoetin deidre (PROCRIT) Injectable 03480 Unit(s) IV Push <User Schedule>  gabapentin 100 milliGRAM(s) Oral three times a day  glucagon  Injectable 1 milliGRAM(s) IntraMuscular once  heparin   Injectable 5000 Unit(s) SubCutaneous every 12 hours  insulin glargine Injectable (LANTUS) 13 Unit(s) SubCutaneous at bedtime  insulin lispro (ADMELOG) corrective regimen sliding scale   SubCutaneous Before meals and at bedtime  insulin lispro Injectable (ADMELOG) 5 Unit(s) SubCutaneous three times a day before meals  lactobacillus acidophilus 1 Tablet(s) Oral every 12 hours  midodrine. 20 milliGRAM(s) Oral three times a day  oxyCODONE    IR 5 milliGRAM(s) Oral every 6 hours    MEDICATIONS  (PRN):  acetaminophen     Tablet .. 650 milliGRAM(s) Oral every 4 hours PRN Temp greater or equal to 38C (100.4F), Mild Pain (1 - 3)  aluminum hydroxide/magnesium hydroxide/simethicone Suspension 30 milliLiter(s) Oral every 4 hours PRN Dyspepsia  HYDROmorphone  Injectable 0.5 milliGRAM(s) IV Push every 4 hours PRN Severe Pain (7 - 10)  melatonin 3 milliGRAM(s) Oral at bedtime PRN Insomnia      OBJECTIVE    T(C): 36.9 (10-20-23 @ 09:30), Max: 37.2 (10-19-23 @ 12:12)  HR: 66 (10-20-23 @ 09:30) (66 - 83)  BP: 113/69 (10-20-23 @ 09:30) (112/61 - 161/76)  RR: 18 (10-20-23 @ 09:30) (15 - 20)  SpO2: 100% (10-20-23 @ 09:30) (93% - 100%)  Wt(kg): --  I&O's Summary        REVIEW OF SYSTEMS:  CONSTITUTIONAL: No fever, weight loss, or fatigue  EYES: No eye pain, visual disturbances, or discharge  ENMT:   No sinus or throat pain  NECK: No pain or stiffness  RESPIRATORY: No cough, wheezing, chills or hemoptysis; No shortness of breath  CARDIOVASCULAR: No chest pain, palpitations, dizziness, or leg swelling  GASTROINTESTINAL: No abdominal pain. No nausea, vomiting; No diarrhea or constipation. No melena or hematochezia.  GENITOURINARY: No dysuria, frequency, hematuria, or incontinence  NEUROLOGICAL: No headaches, memory loss, loss of strength, numbness, or tremors  SKIN: No itching, burning, rashes, or lesions   MUSCULOSKELETAL: No joint pain or swelling; No muscle, back, or extremity pain    PHYSICAL EXAM:  Appearance: NAD. VS past 24 hrs -as above   HEENT:   Moist oral mucosa. Conjunctiva clear b/l.   Neck : supple  Respiratory: Lungs CTAB.  Gastrointestinal:  Soft, nontender. No rebound. No rigidity. BS present	  Cardiovascular: RRR ,S1S2 present  Neurologic: Non-focal. Moving all extremities.  Extremities: No edema. No erythema. No calf tenderness.  Skin: No rashes, No ecchymoses, No cyanosis.	  wounds ,skin lesions-See skin assesment flow sheet   LABS:                        10.4   10.98 )-----------( 597      ( 19 Oct 2023 07:30 )             33.0     10-19    135  |  99  |  24<H>  ----------------------------<  198<H>  4.0   |  26  |  6.70<H>    Ca    9.5      19 Oct 2023 07:30      CAPILLARY BLOOD GLUCOSE      POCT Blood Glucose.: 343 mg/dL (20 Oct 2023 08:11)  POCT Blood Glucose.: 353 mg/dL (19 Oct 2023 21:46)  POCT Blood Glucose.: 206 mg/dL (19 Oct 2023 17:23)  POCT Blood Glucose.: 163 mg/dL (19 Oct 2023 15:02)  POCT Blood Glucose.: 167 mg/dL (19 Oct 2023 12:00)    PT/INR - ( 19 Oct 2023 07:30 )   PT: 11.5 sec;   INR: 0.98 ratio         PTT - ( 19 Oct 2023 07:30 )  PTT:30.9 sec  Urinalysis Basic - ( 19 Oct 2023 07:30 )    Color: x / Appearance: x / SG: x / pH: x  Gluc: 198 mg/dL / Ketone: x  / Bili: x / Urobili: x   Blood: x / Protein: x / Nitrite: x   Leuk Esterase: x / RBC: x / WBC x   Sq Epi: x / Non Sq Epi: x / Bacteria: x        Culture - Surgical Swab (collected 11 Oct 2023 13:51)  Source: .Surgical Swab Surgical Swab  Final Report (14 Oct 2023 15:54):    Moderate Escherichia coli    Few Streptococcus agalactiae (Group B) Streptococcus agalactiae (Group B)    isolated    Group B streptococci are susceptible to ampicillin,    penicillin and cefazolin, but may be resistant to    erythromycin and clindamycin.    Rare Streptococcus mitis/oralis group "Susceptibilities not performed"    Numerous Actinomyces odontolyticus "Susceptibilities not performed"    Few Streptococcus agalactiae (Group B) isolated    Group B streptococci are susceptible to ampicillin,    penicillin and cefazolin, but may be resistant to    erythromycin and clindamycin.  Organism: Escherichia coli (14 Oct 2023 15:54)  Organism: Escherichia coli (14 Oct 2023 15:54)    Culture - Blood (collected 10 Oct 2023 12:00)  Source: .Blood Blood-Peripheral  Final Report (15 Oct 2023 19:00):    No growth at 5 days      RADIOLOGY & ADDITIONAL TESTS:   reviewed elctronically  ASSESSMENT/PLAN: 	     PROGRESS NOTE  Patient is a 48y old  Male who presents with a chief complaint of weakness (20 Oct 2023 09:10)    Chart and available morning labs /imaging are reviewed electronically , urgent issues addressed . More information  is being added upon completion of rounds , when more information is collected and management discussed with consultants , medical staff and social service/case management on the floor   OVERNIGHT Seen in HD unit   No new issues reported by medical staff . 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 Sin/Dr. Singh) Presents to ED [facility] 10/10 with weakness for 2 weeks. pt is dialysis patient M/W/F last dialysis was yesterday but pt has been feeling weak for the last 2 weeks, no cough, fever, chills, no vomiting or diarrhea, pt also has had an open wound under his left thigh that has been neglected for the last few weeks, draining pus and with redness Earler this year he was admitted with vascular steal syndrome c/b R middle finger and forearm gas gangrene s/p amputation and multiple debridements (last 3/16/23) and with associated OM , stabilized s/p debrident and on IV antibiotics .Patient was found to have hypotension and lacticemia , s/p 1500 iv fluids in er , no further iv fluids as per Dr Csatillo nephrologist , next dialysis session is in am .Started on iv zosyn and vancomcyin in er ,midodrine started as per nephrologist recommendation .If bp is not improving may require icu admission ,d/w intensivnist Dr Pickett and er attending . Wound care consult and ID consults are requested . (10 Oct 2023 12:52)    PAST MEDICAL & SURGICAL HISTORY:  ESRD on dialysis      H/O hypotension      Diabetes mellitus      Leg wound, left      Steal syndrome dialysis vascular access      Gangrene of finger of right hand      PVD (peripheral vascular disease)      Anemia of chronic disease      Constipation      HLD (hyperlipidemia)      S/P BKA (below knee amputation) bilateral      AV fistula          MEDICATIONS  (STANDING):  acetaminophen   IVPB .. 1000 milliGRAM(s) IV Intermittent once  atorvastatin 40 milliGRAM(s) Oral at bedtime  cefpodoxime 200 milliGRAM(s) Oral every 24 hours  dextrose 50% Injectable 25 Gram(s) IV Push once  dextrose 50% Injectable 12.5 Gram(s) IV Push once  dorzolamide 2%/timolol 0.5% Ophthalmic Solution 1 Drop(s) Both EYES two times a day  epoetin deidre (PROCRIT) Injectable 87943 Unit(s) IV Push <User Schedule>  gabapentin 100 milliGRAM(s) Oral three times a day  glucagon  Injectable 1 milliGRAM(s) IntraMuscular once  heparin   Injectable 5000 Unit(s) SubCutaneous every 12 hours  insulin glargine Injectable (LANTUS) 13 Unit(s) SubCutaneous at bedtime  insulin lispro (ADMELOG) corrective regimen sliding scale   SubCutaneous Before meals and at bedtime  insulin lispro Injectable (ADMELOG) 5 Unit(s) SubCutaneous three times a day before meals  lactobacillus acidophilus 1 Tablet(s) Oral every 12 hours  midodrine. 20 milliGRAM(s) Oral three times a day  oxyCODONE    IR 5 milliGRAM(s) Oral every 6 hours    MEDICATIONS  (PRN):  acetaminophen     Tablet .. 650 milliGRAM(s) Oral every 4 hours PRN Temp greater or equal to 38C (100.4F), Mild Pain (1 - 3)  aluminum hydroxide/magnesium hydroxide/simethicone Suspension 30 milliLiter(s) Oral every 4 hours PRN Dyspepsia  HYDROmorphone  Injectable 0.5 milliGRAM(s) IV Push every 4 hours PRN Severe Pain (7 - 10)  melatonin 3 milliGRAM(s) Oral at bedtime PRN Insomnia      OBJECTIVE    T(C): 36.9 (10-20-23 @ 09:30), Max: 37.2 (10-19-23 @ 12:12)  HR: 66 (10-20-23 @ 09:30) (66 - 83)  BP: 113/69 (10-20-23 @ 09:30) (112/61 - 161/76)  RR: 18 (10-20-23 @ 09:30) (15 - 20)  SpO2: 100% (10-20-23 @ 09:30) (93% - 100%)  Wt(kg): --  I&O's Summary        REVIEW OF SYSTEMS:  CONSTITUTIONAL: No fever, weight loss, or fatigue  EYES: No eye pain, visual disturbances, or discharge  ENMT:   No sinus or throat pain  NECK: No pain or stiffness  RESPIRATORY: No cough, wheezing, chills or hemoptysis; No shortness of breath  CARDIOVASCULAR: No chest pain, palpitations, dizziness, or leg swelling  GASTROINTESTINAL: No abdominal pain. No nausea, vomiting; No diarrhea or constipation. No melena or hematochezia.  GENITOURINARY: No dysuria, frequency, hematuria, or incontinence  NEUROLOGICAL: No headaches, memory loss, loss of strength, numbness, or tremors  SKIN: No itching, burning, rashes, or lesions   MUSCULOSKELETAL: No joint pain or swelling; No muscle, back, or extremity pain    PHYSICAL EXAM:  Appearance: NAD. VS past 24 hrs -as above   HEENT:   Moist oral mucosa. Conjunctiva clear b/l.   Neck : supple  Respiratory: Lungs CTAB.  Gastrointestinal:  Soft, nontender. No rebound. No rigidity. BS present	  Cardiovascular: RRR ,S1S2 present  Neurologic: Non-focal. Moving all extremities.  Extremities: No edema. No erythema. No calf tenderness.  Skin: No rashes, No ecchymoses, No cyanosis.	  wounds ,skin lesions-See skin assesment flow sheet   LABS:                        10.4   10.98 )-----------( 597      ( 19 Oct 2023 07:30 )             33.0     10-19    135  |  99  |  24<H>  ----------------------------<  198<H>  4.0   |  26  |  6.70<H>    Ca    9.5      19 Oct 2023 07:30      CAPILLARY BLOOD GLUCOSE      POCT Blood Glucose.: 343 mg/dL (20 Oct 2023 08:11)  POCT Blood Glucose.: 353 mg/dL (19 Oct 2023 21:46)  POCT Blood Glucose.: 206 mg/dL (19 Oct 2023 17:23)  POCT Blood Glucose.: 163 mg/dL (19 Oct 2023 15:02)  POCT Blood Glucose.: 167 mg/dL (19 Oct 2023 12:00)    PT/INR - ( 19 Oct 2023 07:30 )   PT: 11.5 sec;   INR: 0.98 ratio         PTT - ( 19 Oct 2023 07:30 )  PTT:30.9 sec  Urinalysis Basic - ( 19 Oct 2023 07:30 )    Color: x / Appearance: x / SG: x / pH: x  Gluc: 198 mg/dL / Ketone: x  / Bili: x / Urobili: x   Blood: x / Protein: x / Nitrite: x   Leuk Esterase: x / RBC: x / WBC x   Sq Epi: x / Non Sq Epi: x / Bacteria: x        Culture - Surgical Swab (collected 11 Oct 2023 13:51)  Source: .Surgical Swab Surgical Swab  Final Report (14 Oct 2023 15:54):    Moderate Escherichia coli    Few Streptococcus agalactiae (Group B) Streptococcus agalactiae (Group B)    isolated    Group B streptococci are susceptible to ampicillin,    penicillin and cefazolin, but may be resistant to    erythromycin and clindamycin.    Rare Streptococcus mitis/oralis group "Susceptibilities not performed"    Numerous Actinomyces odontolyticus "Susceptibilities not performed"    Few Streptococcus agalactiae (Group B) isolated    Group B streptococci are susceptible to ampicillin,    penicillin and cefazolin, but may be resistant to    erythromycin and clindamycin.  Organism: Escherichia coli (14 Oct 2023 15:54)  Organism: Escherichia coli (14 Oct 2023 15:54)    Culture - Blood (collected 10 Oct 2023 12:00)  Source: .Blood Blood-Peripheral  Final Report (15 Oct 2023 19:00):    No growth at 5 days      RADIOLOGY & ADDITIONAL TESTS:   reviewed elctronically  ASSESSMENT/PLAN: 	    25 minutes aggregate time was spent on this visit, 50% visit time spent in care co-ordination with other attendings and counselling patient .I have discussed care plan with patient / HCP/family member ,who expressed understanding of problems treatment and their effect and side effects, alternatives in details. I have asked if they have any questions and concerns and appropriately addressed them to best of my ability.

## 2023-10-20 NOTE — PROGRESS NOTE ADULT - SUBJECTIVE AND OBJECTIVE BOX
Interval History:    CENTRAL LINE:   [  ] YES       [  ] NO  FLORES:                 [  ] YES       [  ] NO         REVIEW OF SYSTEMS:  All Systems below were reviewed and are negative [  ]  HEENT:  ID:  Pulmonary:  Cardiac:  GI:  Renal:  Musculoskeletal:  All other systems above were reviewed and are negative   [  ]      MEDICATIONS  (STANDING):  atorvastatin 40 milliGRAM(s) Oral at bedtime  cefpodoxime 200 milliGRAM(s) Oral every 24 hours  dextrose 50% Injectable 12.5 Gram(s) IV Push once  dextrose 50% Injectable 25 Gram(s) IV Push once  dorzolamide 2%/timolol 0.5% Ophthalmic Solution 1 Drop(s) Both EYES two times a day  epoetin deidre (PROCRIT) Injectable 54841 Unit(s) IV Push <User Schedule>  gabapentin 100 milliGRAM(s) Oral three times a day  glucagon  Injectable 1 milliGRAM(s) IntraMuscular once  heparin   Injectable 5000 Unit(s) SubCutaneous every 12 hours  insulin glargine Injectable (LANTUS) 13 Unit(s) SubCutaneous at bedtime  insulin lispro (ADMELOG) corrective regimen sliding scale   SubCutaneous Before meals and at bedtime  insulin lispro Injectable (ADMELOG) 5 Unit(s) SubCutaneous three times a day before meals  lactobacillus acidophilus 1 Tablet(s) Oral every 12 hours  midodrine. 20 milliGRAM(s) Oral three times a day  oxyCODONE    IR 5 milliGRAM(s) Oral every 6 hours    MEDICATIONS  (PRN):  acetaminophen     Tablet .. 650 milliGRAM(s) Oral every 4 hours PRN Temp greater or equal to 38C (100.4F), Mild Pain (1 - 3)  aluminum hydroxide/magnesium hydroxide/simethicone Suspension 30 milliLiter(s) Oral every 4 hours PRN Dyspepsia  HYDROmorphone  Injectable 0.5 milliGRAM(s) IV Push every 4 hours PRN Severe Pain (7 - 10)  melatonin 3 milliGRAM(s) Oral at bedtime PRN Insomnia      Vital Signs Last 24 Hrs  T(C): 36.6 (20 Oct 2023 13:15), Max: 36.9 (20 Oct 2023 09:30)  T(F): 97.8 (20 Oct 2023 13:15), Max: 98.4 (20 Oct 2023 09:30)  HR: 66 (20 Oct 2023 13:15) (66 - 83)  BP: 122/67 (20 Oct 2023 13:15) (113/69 - 153/82)  BP(mean): --  RR: 18 (20 Oct 2023 13:15) (18 - 18)  SpO2: 100% (20 Oct 2023 13:15) (93% - 100%)    Parameters below as of 20 Oct 2023 13:15  Patient On (Oxygen Delivery Method): room air        I&O's Summary    20 Oct 2023 07:01  -  20 Oct 2023 18:46  --------------------------------------------------------  IN: 800 mL / OUT: 2300 mL / NET: -1500 mL        PHYSICAL EXAM:  HEENT: NC/AT; PERRLA  Neck: Soft; no tenderness  Lungs: CTA bilaterally; no wheezing.   Heart:  Abdomen:  Genital/ Rectal:  Extremities:  Neurologic:  Vascular:      LABORATORY:    CBC Full  -  ( 19 Oct 2023 07:30 )  WBC Count : 10.98 K/uL  RBC Count : 3.46 M/uL  Hemoglobin : 10.4 g/dL  Hematocrit : 33.0 %  Platelet Count - Automated : 597 K/uL  Mean Cell Volume : 95.4 fl  Mean Cell Hemoglobin : 30.1 pg  Mean Cell Hemoglobin Concentration : 31.5 gm/dL  Auto Neutrophil # : x  Auto Lymphocyte # : x  Auto Monocyte # : x  Auto Eosinophil # : x  Auto Basophil # : x  Auto Neutrophil % : x  Auto Lymphocyte % : x  Auto Monocyte % : x  Auto Eosinophil % : x  Auto Basophil % : x          10-19    135  |  99  |  24<H>  ----------------------------<  198<H>  4.0   |  26  |  6.70<H>    Ca    9.5      19 Oct 2023 07:30            Assessment and Plan:          Nii Alonzo MD   [phone].    He is afebrile  Comfortable     MEDICATIONS  (STANDING):  atorvastatin 40 milliGRAM(s) Oral at bedtime  cefpodoxime 200 milliGRAM(s) Oral every 24 hours  dextrose 50% Injectable 12.5 Gram(s) IV Push once  dextrose 50% Injectable 25 Gram(s) IV Push once  dorzolamide 2%/timolol 0.5% Ophthalmic Solution 1 Drop(s) Both EYES two times a day  epoetin deidre (PROCRIT) Injectable 58541 Unit(s) IV Push <User Schedule>  gabapentin 100 milliGRAM(s) Oral three times a day  glucagon  Injectable 1 milliGRAM(s) IntraMuscular once  heparin   Injectable 5000 Unit(s) SubCutaneous every 12 hours  insulin glargine Injectable (LANTUS) 13 Unit(s) SubCutaneous at bedtime  insulin lispro (ADMELOG) corrective regimen sliding scale   SubCutaneous Before meals and at bedtime  insulin lispro Injectable (ADMELOG) 5 Unit(s) SubCutaneous three times a day before meals  lactobacillus acidophilus 1 Tablet(s) Oral every 12 hours  midodrine. 20 milliGRAM(s) Oral three times a day  oxyCODONE    IR 5 milliGRAM(s) Oral every 6 hours    MEDICATIONS  (PRN):  acetaminophen     Tablet .. 650 milliGRAM(s) Oral every 4 hours PRN Temp greater or equal to 38C (100.4F), Mild Pain (1 - 3)  aluminum hydroxide/magnesium hydroxide/simethicone Suspension 30 milliLiter(s) Oral every 4 hours PRN Dyspepsia  HYDROmorphone  Injectable 0.5 milliGRAM(s) IV Push every 4 hours PRN Severe Pain (7 - 10)  melatonin 3 milliGRAM(s) Oral at bedtime PRN Insomnia      Vital Signs Last 24 Hrs  T(C): 36.6 (20 Oct 2023 13:15), Max: 36.9 (20 Oct 2023 09:30)  T(F): 97.8 (20 Oct 2023 13:15), Max: 98.4 (20 Oct 2023 09:30)  HR: 66 (20 Oct 2023 13:15) (66 - 83)  BP: 122/67 (20 Oct 2023 13:15) (113/69 - 153/82)  BP(mean): --  RR: 18 (20 Oct 2023 13:15) (18 - 18)  SpO2: 100% (20 Oct 2023 13:15) (93% - 100%)    Parameters below as of 20 Oct 2023 13:15  Patient On (Oxygen Delivery Method): room air        I&O's Summary    20 Oct 2023 07:01  -  20 Oct 2023 18:46  --------------------------------------------------------  IN: 800 mL / OUT: 2300 mL / NET: -1500 mL        PHYSICAL EXAM:  HEENT: NC/AT; PERRLA  Neck: Soft; no tenderness  Lungs: CTA bilaterally; no wheezing.   Heart:  Abdomen:  Genital/ Rectal:  Extremities:  Neurologic:  Vascular:      LABORATORY:    CBC Full  -  ( 19 Oct 2023 07:30 )  WBC Count : 10.98 K/uL  RBC Count : 3.46 M/uL  Hemoglobin : 10.4 g/dL  Hematocrit : 33.0 %  Platelet Count - Automated : 597 K/uL  Mean Cell Volume : 95.4 fl  Mean Cell Hemoglobin : 30.1 pg  Mean Cell Hemoglobin Concentration : 31.5 gm/dL  Auto Neutrophil # : x  Auto Lymphocyte # : x  Auto Monocyte # : x  Auto Eosinophil # : x  Auto Basophil # : x  Auto Neutrophil % : x  Auto Lymphocyte % : x  Auto Monocyte % : x  Auto Eosinophil % : x  Auto Basophil % : x      135  |  99  |  24<H>  ----------------------------<  198<H>  4.0   |  26  |  6.70<H>    Ca    9.5      19 Oct 2023 07:30    Assessment and Plan:    1. Left posterior thigh infected wound with necrotizing fasciitis, s/p debridement and left AKA.   2. Sepsis, likely due to infected wound.  3. ESRD on HD  4. Bilateral BKA.    . Sepsis has resolved.   . L AKA and wound is healing well/. .   . Wound cultures grew group B Strep and E coli.   . Discontinue IV Zosyn. Add po Vantin 200 mg daily for 7 days.   . Waiting for closure L AKA stump tomorrow with surgery. Wound care as per surgery.  . Will sign off. The patient to follow with surgery      Nii Alonzo MD   [phone].    He is afebrile  Comfortable   He had closure surgery of L AKA stump yesterday.       MEDICATIONS  (STANDING):  atorvastatin 40 milliGRAM(s) Oral at bedtime  cefpodoxime 200 milliGRAM(s) Oral every 24 hours  dextrose 50% Injectable 12.5 Gram(s) IV Push once  dextrose 50% Injectable 25 Gram(s) IV Push once  dorzolamide 2%/timolol 0.5% Ophthalmic Solution 1 Drop(s) Both EYES two times a day  epoetin deidre (PROCRIT) Injectable 32865 Unit(s) IV Push <User Schedule>  gabapentin 100 milliGRAM(s) Oral three times a day  glucagon  Injectable 1 milliGRAM(s) IntraMuscular once  heparin   Injectable 5000 Unit(s) SubCutaneous every 12 hours  insulin glargine Injectable (LANTUS) 13 Unit(s) SubCutaneous at bedtime  insulin lispro (ADMELOG) corrective regimen sliding scale   SubCutaneous Before meals and at bedtime  insulin lispro Injectable (ADMELOG) 5 Unit(s) SubCutaneous three times a day before meals  lactobacillus acidophilus 1 Tablet(s) Oral every 12 hours  midodrine. 20 milliGRAM(s) Oral three times a day  oxyCODONE    IR 5 milliGRAM(s) Oral every 6 hours    MEDICATIONS  (PRN):  acetaminophen     Tablet .. 650 milliGRAM(s) Oral every 4 hours PRN Temp greater or equal to 38C (100.4F), Mild Pain (1 - 3)  aluminum hydroxide/magnesium hydroxide/simethicone Suspension 30 milliLiter(s) Oral every 4 hours PRN Dyspepsia  HYDROmorphone  Injectable 0.5 milliGRAM(s) IV Push every 4 hours PRN Severe Pain (7 - 10)  melatonin 3 milliGRAM(s) Oral at bedtime PRN Insomnia      Vital Signs Last 24 Hrs  T(C): 36.6 (20 Oct 2023 13:15), Max: 36.9 (20 Oct 2023 09:30)  T(F): 97.8 (20 Oct 2023 13:15), Max: 98.4 (20 Oct 2023 09:30)  HR: 66 (20 Oct 2023 13:15) (66 - 83)  BP: 122/67 (20 Oct 2023 13:15) (113/69 - 153/82)  BP(mean): --  RR: 18 (20 Oct 2023 13:15) (18 - 18)  SpO2: 100% (20 Oct 2023 13:15) (93% - 100%)    Parameters below as of 20 Oct 2023 13:15  Patient On (Oxygen Delivery Method): room air        I&O's Summary    20 Oct 2023 07:01  -  20 Oct 2023 18:46  --------------------------------------------------------  IN: 800 mL / OUT: 2300 mL / NET: -1500 mL        PHYSICAL EXAM:  HEENT: NC/AT; PERRLA  Neck: Soft; no tenderness  Lungs: CTA bilaterally; no wheezing.   Heart: RRR, no murmurs.   Abdomen: Soft, no masses. No tenderness.   Genital/ Rectal: No lackey cath.  Extremities: Left AKA stump with clean dressings.  Neurologic: Awake.       LABORATORY:    CBC Full  -  ( 19 Oct 2023 07:30 )  WBC Count : 10.98 K/uL  RBC Count : 3.46 M/uL  Hemoglobin : 10.4 g/dL  Hematocrit : 33.0 %  Platelet Count - Automated : 597 K/uL  Mean Cell Volume : 95.4 fl  Mean Cell Hemoglobin : 30.1 pg  Mean Cell Hemoglobin Concentration : 31.5 gm/dL  Auto Neutrophil # : x  Auto Lymphocyte # : x  Auto Monocyte # : x  Auto Eosinophil # : x  Auto Basophil # : x  Auto Neutrophil % : x  Auto Lymphocyte % : x  Auto Monocyte % : x  Auto Eosinophil % : x  Auto Basophil % : x      135  |  99  |  24<H>  ----------------------------<  198<H>  4.0   |  26  |  6.70<H>    Ca    9.5      19 Oct 2023 07:30    Assessment and Plan:    1. Left posterior thigh infected wound with necrotizing fasciitis, s/p debridement and left AKA.   2. Sepsis, likely due to infected wound.  3. ESRD on HD  4. Bilateral BKA.    . Sepsis has resolved.   . L AKA and wound is healing well. He had wound closure yesterday.   . Wound cultures grew group B Strep and E coli.   . Continue  po Vantin 200 mg daily for 7 days.   . Waiting for closure L AKA stump tomorrow with surgery. Wound care as per surgery.  . Will sign off. The patient to follow with surgery      Nii Alonzo MD   [phone].

## 2023-10-20 NOTE — PROGRESS NOTE ADULT - ASSESSMENT
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 Sin/Dr. Singh) Presents to ED [facility] [place] 10/10 with weakness for 2 weeks    anemia  esrd  weakness  DM  BKA  PAD  PVD  Pain    on ABX  POD 1  AKA  vs noted  labs reviewed    full code  lives with family at home - in [facility] as per renal  pain relief  oral hygiene  skin care  wound care  assist with needs

## 2023-10-20 NOTE — PROGRESS NOTE ADULT - SUBJECTIVE AND OBJECTIVE BOX
CHIEF COMPLAINT/ REASON FOR VISIT  .. Patient was seen to address the  issue listed under PROBLEM LIST which is located toward bottom of this note     MELVI GREGORYSERA    PLV [id number] 366 W1    Allergies    No Known Drug Allergies  [place] (Rash)    Intolerances        PAST MEDICAL & SURGICAL HISTORY:  ESRD on dialysis      H/O hypotension      Diabetes mellitus      Leg wound, left      Steal syndrome dialysis vascular access      Gangrene of finger of right hand      PVD (peripheral vascular disease)      Anemia of chronic disease      Constipation      HLD (hyperlipidemia)      S/P BKA (below knee amputation) bilateral      AV fistula          FAMILY HISTORY:      Home Medications:  Admelog 100 units/mL injectable solution: 4 unit(s) subcutaneously 3 times a day (10 Oct 2023 14:32)  amLODIPine 5 mg oral tablet: 1 tab(s) orally once a day (10 Oct 2023 14:32)  atorvastatin 40 mg oral tablet: 1 tab(s) orally once a day (10 Oct 2023 14:32)  insulin glargine 100 units/mL subcutaneous solution: 4 unit(s) subcutaneous once a day (at bedtime) (10 Oct 2023 14:32)  senna (sennosides) 8.6 mg oral tablet: 2 tab(s) orally once a day (at bedtime) (10 Oct 2023 14:32)  Velphoro 500 mg oral tablet, chewable: 3 tab(s) chewed 3 times a day (10 Oct 2023 14:32)      MEDICATIONS  (STANDING):  acetaminophen   IVPB .. 1000 milliGRAM(s) IV Intermittent once  acetaminophen   IVPB .. 1000 milliGRAM(s) IV Intermittent once  atorvastatin 40 milliGRAM(s) Oral at bedtime  cefpodoxime 200 milliGRAM(s) Oral every 24 hours  dextrose 50% Injectable 12.5 Gram(s) IV Push once  dextrose 50% Injectable 25 Gram(s) IV Push once  dorzolamide 2%/timolol 0.5% Ophthalmic Solution 1 Drop(s) Both EYES two times a day  epoetin deidre (PROCRIT) Injectable 41058 Unit(s) IV Push <User Schedule>  gabapentin 100 milliGRAM(s) Oral three times a day  glucagon  Injectable 1 milliGRAM(s) IntraMuscular once  insulin glargine Injectable (LANTUS) 13 Unit(s) SubCutaneous at bedtime  insulin lispro (ADMELOG) corrective regimen sliding scale   SubCutaneous Before meals and at bedtime  insulin lispro Injectable (ADMELOG) 5 Unit(s) SubCutaneous three times a day before meals  lactobacillus acidophilus 1 Tablet(s) Oral every 12 hours  midodrine. 20 milliGRAM(s) Oral three times a day  oxyCODONE    IR 5 milliGRAM(s) Oral every 6 hours  sodium chloride 0.9%. 1000 milliLiter(s) (35 mL/Hr) IV Continuous <Continuous>    MEDICATIONS  (PRN):  acetaminophen     Tablet .. 650 milliGRAM(s) Oral every 4 hours PRN Temp greater or equal to 38C (100.4F), Mild Pain (1 - 3)  aluminum hydroxide/magnesium hydroxide/simethicone Suspension 30 milliLiter(s) Oral every 4 hours PRN Dyspepsia  HYDROmorphone  Injectable 0.5 milliGRAM(s) IV Push every 4 hours PRN Severe Pain (7 - 10)  melatonin 3 milliGRAM(s) Oral at bedtime PRN Insomnia      Diet, DASH/TLC:   Sodium & Cholesterol Restricted  Consistent Carbohydrate Evening Snack  For patients receiving Renal Replacement - No Protein Restr, No Conc K, No Conc Phos, Low Sodium (RENAL)  Supplement Feeding Modality:  Oral  Nepro Cans or Servings Per Day:  1       Frequency:  Three Times a day (10-19-23 @ 15:26) [Active]          Vital Signs Last 24 Hrs  T(C): 36.4 (20 Oct 2023 04:33), Max: 37.2 (19 Oct 2023 12:12)  T(F): 97.5 (20 Oct 2023 04:33), Max: 99 (19 Oct 2023 12:12)  HR: 68 (20 Oct 2023 04:33) (66 - 83)  BP: 121/72 (20 Oct 2023 04:33) (112/61 - 161/76)  BP(mean): --  RR: 18 (20 Oct 2023 04:33) (15 - 20)  SpO2: 95% (20 Oct 2023 04:33) (93% - 100%)    Parameters below as of 20 Oct 2023 04:33  Patient On (Oxygen Delivery Method): room air                  LABS:                        10.4   10.98 )-----------( 597      ( 19 Oct 2023 07:30 )             33.0     10-19    135  |  99  |  24<H>  ----------------------------<  198<H>  4.0   |  26  |  6.70<H>    Ca    9.5      19 Oct 2023 07:30  Phos  5.2     10-18  Mg     2.4     10-18      PT/INR - ( 19 Oct 2023 07:30 )   PT: 11.5 sec;   INR: 0.98 ratio         PTT - ( 19 Oct 2023 07:30 )  PTT:30.9 sec  Urinalysis Basic - ( 19 Oct 2023 07:30 )    Color: x / Appearance: x / SG: x / pH: x  Gluc: 198 mg/dL / Ketone: x  / Bili: x / Urobili: x   Blood: x / Protein: x / Nitrite: x   Leuk Esterase: x / RBC: x / WBC x   Sq Epi: x / Non Sq Epi: x / Bacteria: x            WBC:  WBC Count: 10.98 K/uL (10-19 @ 07:30)  WBC Count: 11.97 K/uL (10-18 @ 10:30)  WBC Count: 11.20 K/uL (10-17 @ 05:35)  WBC Count: 11.44 K/uL (10-16 @ 06:10)      MICROBIOLOGY:  RECENT CULTURES:              PT/INR - ( 19 Oct 2023 07:30 )   PT: 11.5 sec;   INR: 0.98 ratio         PTT - ( 19 Oct 2023 07:30 )  PTT:30.9 sec    Sodium:  Sodium: 135 mmol/L (10-19 @ 07:30)  Sodium: 137 mmol/L (10-18 @ 10:30)  Sodium: 137 mmol/L (10-17 @ 05:35)  Sodium: 134 mmol/L (10-16 @ 06:10)      6.70 mg/dL 10-19 @ 07:30  9.10 mg/dL 10-18 @ 10:30  6.40 mg/dL 10-17 @ 05:35  8.10 mg/dL 10-16 @ 06:10      Hemoglobin:  Hemoglobin: 10.4 g/dL (10-19 @ 07:30)  Hemoglobin: 8.7 g/dL (10-18 @ 10:30)  Hemoglobin: 9.9 g/dL (10-17 @ 05:35)  Hemoglobin: 9.8 g/dL (10-16 @ 06:10)      Platelets: Platelet Count - Automated: 597 K/uL (10-19 @ 07:30)  Platelet Count - Automated: 486 K/uL (10-18 @ 10:30)  Platelet Count - Automated: 472 K/uL (10-17 @ 05:35)  Platelet Count - Automated: 464 K/uL (10-16 @ 06:10)          Urinalysis Basic - ( 19 Oct 2023 07:30 )    Color: x / Appearance: x / SG: x / pH: x  Gluc: 198 mg/dL / Ketone: x  / Bili: x / Urobili: x   Blood: x / Protein: x / Nitrite: x   Leuk Esterase: x / RBC: x / WBC x   Sq Epi: x / Non Sq Epi: x / Bacteria: x        RADIOLOGY & ADDITIONAL STUDIES:      MICROBIOLOGY:  RECENT CULTURES:

## 2023-10-20 NOTE — PROGRESS NOTE ADULT - ASSESSMENT
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 Sin/Dr. Singh) Presents to ED [facility] 10/10 with weakness for 2 weeks. 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 Wound care consult and ID consults are requested .

## 2023-10-20 NOTE — SOCIAL WORK PROGRESS NOTE - NSSWPROGRESSNOTE_GEN_ALL_CORE
SW met with this pt at bedside- he is awaiting PT eval. Discussed possible plans for ALDAIR- he feels he can return home and go to outpatient PT. Will discuss further once pt is seen and evaluated. SW to follow.

## 2023-10-20 NOTE — PROGRESS NOTE ADULT - ASSESSMENT
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 Sin/Dr. Singh) Presents to ED [facility] 10/10 with weakness for 2 weeks. 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 Wound care consult and ID consults are requested . (10 Oct 2023 12:52)      esrd on hd for mwf   Excess fluids and waste products will be removed from your blood; your electrolytes will be balanced; your blood pressure will be controlled.    Admit for septic workup and ID evaluation,send blood and urine cx,serial lactate levels,monitor vitals closley,ivfs hydration,monitor urine output and renal profile,iv abx as per id cons  vancomycin  IVPB 500 milliGRAM(s) IV Intermittent once    BP monitoring,continue current  meds, low salt diet,followup with PMD in 1-2 weeks  midodrine. 10 milliGRAM(s) Oral three times a day  norepinephrine Infusion 0.05 MICROgram(s)/kG/Min (6 mL/Hr) IV Continuous       ANEMIA PLAN:  Anemia of chronic disease:  We will continue epo aiming for a HCT of 32-36 %.   We will monitor Iron stores, B12 and RBC folate .    dvt heparin   Injectable 5000 Unit(s) SubCutaneous every 12 hours  Taken to OR for urgent left knee disarticulation for gas gangrne  POD#4

## 2023-10-20 NOTE — PROGRESS NOTE ADULT - SUBJECTIVE AND OBJECTIVE BOX
Patient is a 48y old  Male who presents with a chief complaint of weakness (20 Oct 2023 05:58)    Pt c/o L AKA incisional pain which is managed well with current regimen. He denies any SOB or CP, no fevers or chills    S/P Left Above knee amputation                POD #1    MEDICATIONS  (STANDING):  acetaminophen   IVPB .. 1000 milliGRAM(s) IV Intermittent once  atorvastatin 40 milliGRAM(s) Oral at bedtime  cefpodoxime 200 milliGRAM(s) Oral every 24 hours  dextrose 50% Injectable 12.5 Gram(s) IV Push once  dextrose 50% Injectable 25 Gram(s) IV Push once  dorzolamide 2%/timolol 0.5% Ophthalmic Solution 1 Drop(s) Both EYES two times a day  epoetin deidre (PROCRIT) Injectable 43571 Unit(s) IV Push <User Schedule>  gabapentin 100 milliGRAM(s) Oral three times a day  glucagon  Injectable 1 milliGRAM(s) IntraMuscular once  insulin glargine Injectable (LANTUS) 13 Unit(s) SubCutaneous at bedtime  insulin lispro (ADMELOG) corrective regimen sliding scale   SubCutaneous Before meals and at bedtime  insulin lispro Injectable (ADMELOG) 5 Unit(s) SubCutaneous three times a day before meals  lactobacillus acidophilus 1 Tablet(s) Oral every 12 hours  midodrine. 20 milliGRAM(s) Oral three times a day  oxyCODONE    IR 5 milliGRAM(s) Oral every 6 hours  sodium chloride 0.9%. 1000 milliLiter(s) (35 mL/Hr) IV Continuous <Continuous>    MEDICATIONS  (PRN):  acetaminophen     Tablet .. 650 milliGRAM(s) Oral every 4 hours PRN Temp greater or equal to 38C (100.4F), Mild Pain (1 - 3)  aluminum hydroxide/magnesium hydroxide/simethicone Suspension 30 milliLiter(s) Oral every 4 hours PRN Dyspepsia  HYDROmorphone  Injectable 0.5 milliGRAM(s) IV Push every 4 hours PRN Severe Pain (7 - 10)  melatonin 3 milliGRAM(s) Oral at bedtime PRN Insomnia    Allergies  No Known Drug Allergies  [place] (Rash)      Vital Signs Last 24 Hrs  T(C): 36.4 (20 Oct 2023 04:33), Max: 37.2 (19 Oct 2023 12:12)  T(F): 97.5 (20 Oct 2023 04:33), Max: 99 (19 Oct 2023 12:12)  HR: 68 (20 Oct 2023 04:33) (66 - 83)  BP: 121/72 (20 Oct 2023 04:33) (112/61 - 161/76)  BP(mean): --  RR: 18 (20 Oct 2023 04:33) (15 - 20)  SpO2: 95% (20 Oct 2023 04:33) (93% - 100%)    Parameters below as of 20 Oct 2023 04:33  Patient On (Oxygen Delivery Method): room air      I&O's Detail    Physical Exam:  General: NAD, resting comfortably in bed  Pulmonary: Nonlabored breathing, no respiratory distress, CTA  Cardiovascular: NSR  Abdominal: soft, NT/ND  Extremities: L stump dressing off; suture line with staples intact without any drainage or erythema; + edema      LABS:                        10.4   10.98 )-----------( 597      ( 19 Oct 2023 07:30 )             33.0     10-19    135  |  99  |  24<H>  ----------------------------<  198<H>  4.0   |  26  |  6.70<H>    Ca    9.5      19 Oct 2023 07:30  Phos  5.2     10-18  Mg     2.4     10-18      PT/INR - ( 19 Oct 2023 07:30 )   PT: 11.5 sec;   INR: 0.98 ratio    PTT - ( 19 Oct 2023 07:30 )  PTT:30.9 sec    CAPILLARY BLOOD GLUCOSE  POCT Blood Glucose.: 343 mg/dL (20 Oct 2023 08:11)  POCT Blood Glucose.: 353 mg/dL (19 Oct 2023 21:46)  POCT Blood Glucose.: 206 mg/dL (19 Oct 2023 17:23)  POCT Blood Glucose.: 163 mg/dL (19 Oct 2023 15:02)  POCT Blood Glucose.: 167 mg/dL (19 Oct 2023 12:00)    RECENT CULTURES:      Radiology and Additional Studies:

## 2023-10-20 NOTE — PROGRESS NOTE ADULT - SUBJECTIVE AND OBJECTIVE BOX
Date/Time Patient Seen:  		  Referring MD:   Data Reviewed	       Patient is a 48y old  Male who presents with a chief complaint of weakness (19 Oct 2023 17:21)      Subjective/HPI     PAST MEDICAL & SURGICAL HISTORY:  ESRD on dialysis    H/O hypotension    Diabetes mellitus    Leg wound, left    Steal syndrome dialysis vascular access    Gangrene of finger of right hand    PVD (peripheral vascular disease)    Anemia of chronic disease    Constipation    HLD (hyperlipidemia)    S/P BKA (below knee amputation) bilateral    AV fistula          Medication list         MEDICATIONS  (STANDING):  acetaminophen   IVPB .. 1000 milliGRAM(s) IV Intermittent once  acetaminophen   IVPB .. 1000 milliGRAM(s) IV Intermittent once  atorvastatin 40 milliGRAM(s) Oral at bedtime  cefpodoxime 200 milliGRAM(s) Oral every 24 hours  dextrose 50% Injectable 12.5 Gram(s) IV Push once  dextrose 50% Injectable 25 Gram(s) IV Push once  dorzolamide 2%/timolol 0.5% Ophthalmic Solution 1 Drop(s) Both EYES two times a day  epoetin deidre (PROCRIT) Injectable 89087 Unit(s) IV Push <User Schedule>  gabapentin 100 milliGRAM(s) Oral three times a day  glucagon  Injectable 1 milliGRAM(s) IntraMuscular once  insulin glargine Injectable (LANTUS) 13 Unit(s) SubCutaneous at bedtime  insulin lispro (ADMELOG) corrective regimen sliding scale   SubCutaneous Before meals and at bedtime  insulin lispro Injectable (ADMELOG) 5 Unit(s) SubCutaneous three times a day before meals  lactobacillus acidophilus 1 Tablet(s) Oral every 12 hours  midodrine. 20 milliGRAM(s) Oral three times a day  oxyCODONE    IR 5 milliGRAM(s) Oral every 6 hours  sodium chloride 0.9%. 1000 milliLiter(s) (35 mL/Hr) IV Continuous <Continuous>    MEDICATIONS  (PRN):  acetaminophen     Tablet .. 650 milliGRAM(s) Oral every 4 hours PRN Temp greater or equal to 38C (100.4F), Mild Pain (1 - 3)  aluminum hydroxide/magnesium hydroxide/simethicone Suspension 30 milliLiter(s) Oral every 4 hours PRN Dyspepsia  HYDROmorphone  Injectable 0.5 milliGRAM(s) IV Push every 4 hours PRN Severe Pain (7 - 10)  melatonin 3 milliGRAM(s) Oral at bedtime PRN Insomnia         Vitals log        ICU Vital Signs Last 24 Hrs  T(C): 36.4 (20 Oct 2023 04:33), Max: 37.2 (19 Oct 2023 12:12)  T(F): 97.5 (20 Oct 2023 04:33), Max: 99 (19 Oct 2023 12:12)  HR: 68 (20 Oct 2023 04:33) (66 - 83)  BP: 121/72 (20 Oct 2023 04:33) (112/61 - 161/76)  BP(mean): --  ABP: --  ABP(mean): --  RR: 18 (20 Oct 2023 04:33) (15 - 20)  SpO2: 95% (20 Oct 2023 04:33) (93% - 100%)    O2 Parameters below as of 20 Oct 2023 04:33  Patient On (Oxygen Delivery Method): room air                 Input and Output:  I&O's Detail      Lab Data                        10.4   10.98 )-----------( 597      ( 19 Oct 2023 07:30 )             33.0     10-19    135  |  99  |  24<H>  ----------------------------<  198<H>  4.0   |  26  |  6.70<H>    Ca    9.5      19 Oct 2023 07:30  Phos  5.2     10-18  Mg     2.4     10-18              Review of Systems	      Objective     Physical Examination    heart s1s2  lung dec BS      Pertinent Lab findings & Imaging      Barak:  NO   Adequate UO     I&O's Detail           Discussed with:     Cultures:	        Radiology

## 2023-10-20 NOTE — PROGRESS NOTE ADULT - ASSESSMENT
REASON FOR VISIT  .. Management of problems listed below        REVIEW OF SYMPTOMS   Able to give ROS  Yes     RELIABILITY +/-   CONSTITUTIONAL Weakness Yes    ENDOCRINE  No heat or cold intolerance    ALLERGY No hives  Sore throat No stridor  RESP Shortness of breath YES   NEURO New weakness No   CARDIAC   Palpitations No         PHYSICAL EXAM    HEENT Unremarkable  atraumatic   RESP Fair air entry  Harsh breath sound   CARDIAC S1 S2 No S3     NO JVD    ABDOMEN No hepatosplenomegaly   bl bka  NO rash       ABGS.   .     VS/ PO/IO/ VENT/ DRIPS.  10/20/2023 afeb 60 120/06183/60  10/20/2023 ra 100%     SUMMARY.   . 48 m PMH esrd admitted 10/10 with shock found to have necroitizing fascitis   . Pt had l knee disarticulation done 10/11     OVERALL PLAN  . SSTI   . NECROTIZING FASCITIS 10/11/2023   .. 10/10 l post knee pressure ulcer   .. 10/11 surg strep agalacticae gp b e coli sens piptaz   .. Givn vanco started 10/10 On Vanco started 10/10   .. 10/10-10/19/2023 zosyn   .. 10/19/2023 cefpodoxime   . SHOCK  now resolvd   .. 10/26 echo ef 56% n lvdd   .. 10/12-10/14/2023 Nore    .. Target MAP 65 (+)  . SP Urgent DISARTICULATION l knee 10/11   ..  RTOR 10/19 for AKA closure     TIME SPENT.   . Over 36 minutes aggregate care time spent on encounter; activities included   direct patient care, counseling and/or coordinating care reviewing notes, lab data/ imaging , discussion with multidisciplinary team/ patient  /family and explaining in detail risks, benefits, alternatives  of the recommendations     MICHAEL TIRADO 48 m 10/10/2023 [address] DR ELENA SCHM[address]ER DR MOHSEN PAHLAVAN

## 2023-10-20 NOTE — PROGRESS NOTE ADULT - SUBJECTIVE AND OBJECTIVE BOX
Patient is a 48y Male whom presented to the hospital with esrd on hd     PAST MEDICAL & SURGICAL HISTORY:  ESRD on dialysis      H/O hypotension      Diabetes mellitus      Leg wound, left      Steal syndrome dialysis vascular access      Gangrene of finger of right hand      PVD (peripheral vascular disease)      Anemia of chronic disease      Constipation      HLD (hyperlipidemia)      S/P BKA (below knee amputation) bilateral      AV fistula          MEDICATIONS  (STANDING):  atorvastatin 40 milliGRAM(s) Oral at bedtime  collagenase Ointment 1 Application(s) Topical daily  dextrose 5%. 1000 milliLiter(s) (50 mL/Hr) IV Continuous <Continuous>  dextrose 5%. 1000 milliLiter(s) (100 mL/Hr) IV Continuous <Continuous>  dextrose 50% Injectable 25 Gram(s) IV Push once  dextrose 50% Injectable 25 Gram(s) IV Push once  dextrose 50% Injectable 12.5 Gram(s) IV Push once  dorzolamide 2%/timolol 0.5% Ophthalmic Solution 1 Drop(s) Both EYES two times a day  glucagon  Injectable 1 milliGRAM(s) IntraMuscular once  heparin   Injectable 5000 Unit(s) SubCutaneous every 12 hours  insulin lispro (ADMELOG) corrective regimen sliding scale   SubCutaneous every 6 hours  lactobacillus acidophilus 1 Tablet(s) Oral every 12 hours  midodrine. 10 milliGRAM(s) Oral three times a day  pantoprazole    Tablet 40 milliGRAM(s) Oral before breakfast  piperacillin/tazobactam IVPB.. 3.375 Gram(s) IV Intermittent every 12 hours  senna 2 Tablet(s) Oral at bedtime      Allergies    No Known Drug Allergies  [place] (Rash)    Intolerances        SOCIAL HISTORY:  Denies ETOh,Smoking,     FAMILY HISTORY:      REVIEW OF SYSTEMS:    CONSTITUTIONAL: No weakness, fevers or chills  EYES/ENT: No visual changes;  no throat pain   NECK: No pain or stiffness  RESPIRATORY: No cough, wheezing, hemoptysis; No shortness of breath  CARDIOVASCULAR: No chest pain or palpitations  GASTROINTESTINAL: No abdominal or epigastric pain. No nausea, vomiting,                               10.4   10.98 )-----------( 597      ( 19 Oct 2023 07:30 )             33.0       CBC Full  -  ( 19 Oct 2023 07:30 )  WBC Count : 10.98 K/uL  RBC Count : 3.46 M/uL  Hemoglobin : 10.4 g/dL  Hematocrit : 33.0 %  Platelet Count - Automated : 597 K/uL  Mean Cell Volume : 95.4 fl  Mean Cell Hemoglobin : 30.1 pg  Mean Cell Hemoglobin Concentration : 31.5 gm/dL  Auto Neutrophil # : x  Auto Lymphocyte # : x  Auto Monocyte # : x  Auto Eosinophil # : x  Auto Basophil # : x  Auto Neutrophil % : x  Auto Lymphocyte % : x  Auto Monocyte % : x  Auto Eosinophil % : x  Auto Basophil % : x      10-19    135  |  99  |  24<H>  ----------------------------<  198<H>  4.0   |  26  |  6.70<H>    Ca    9.5      19 Oct 2023 07:30        CAPILLARY BLOOD GLUCOSE      POCT Blood Glucose.: 361 mg/dL (20 Oct 2023 17:27)  POCT Blood Glucose.: 170 mg/dL (20 Oct 2023 13:12)  POCT Blood Glucose.: 343 mg/dL (20 Oct 2023 08:11)  POCT Blood Glucose.: 353 mg/dL (19 Oct 2023 21:46)      Vital Signs Last 24 Hrs  T(C): 36.8 (20 Oct 2023 20:18), Max: 36.9 (20 Oct 2023 09:30)  T(F): 98.2 (20 Oct 2023 20:18), Max: 98.4 (20 Oct 2023 09:30)  HR: 81 (20 Oct 2023 20:18) (66 - 83)  BP: 104/64 (20 Oct 2023 20:18) (104/64 - 153/82)  BP(mean): --  RR: 18 (20 Oct 2023 20:18) (18 - 18)  SpO2: 94% (20 Oct 2023 20:18) (93% - 100%)    Parameters below as of 20 Oct 2023 13:15  Patient On (Oxygen Delivery Method): room air        Urinalysis Basic - ( 19 Oct 2023 07:30 )    Color: x / Appearance: x / SG: x / pH: x  Gluc: 198 mg/dL / Ketone: x  / Bili: x / Urobili: x   Blood: x / Protein: x / Nitrite: x   Leuk Esterase: x / RBC: x / WBC x   Sq Epi: x / Non Sq Epi: x / Bacteria: x        PT/INR - ( 19 Oct 2023 07:30 )   PT: 11.5 sec;   INR: 0.98 ratio         PTT - ( 19 Oct 2023 07:30 )  PTT:30.9 sec          PHYSICAL EXAM:    Constitutional: NAD  HEENT: conjunctive   clear   Neck:  No JVD  Respiratory: CTAB  Cardiovascular: S1 and S2  Gastrointestinal: BS+, soft, NT/ND  Extremities: No peripheral edema  Neurological: no focal deficits  Psychiatric: Normal mood, normal affect  : No [place]  Skin: No rashes   S/P BKA (below knee amputation) bilateral  AV fistula  LABS:

## 2023-10-21 LAB
ANION GAP SERPL CALC-SCNC: 12 MMOL/L — SIGNIFICANT CHANGE UP (ref 5–17)
ANION GAP SERPL CALC-SCNC: 12 MMOL/L — SIGNIFICANT CHANGE UP (ref 5–17)
BUN SERPL-MCNC: 37 MG/DL — HIGH (ref 7–23)
BUN SERPL-MCNC: 37 MG/DL — HIGH (ref 7–23)
CALCIUM SERPL-MCNC: 8.8 MG/DL — SIGNIFICANT CHANGE UP (ref 8.5–10.1)
CALCIUM SERPL-MCNC: 8.8 MG/DL — SIGNIFICANT CHANGE UP (ref 8.5–10.1)
CHLORIDE SERPL-SCNC: 100 MMOL/L — SIGNIFICANT CHANGE UP (ref 96–108)
CHLORIDE SERPL-SCNC: 100 MMOL/L — SIGNIFICANT CHANGE UP (ref 96–108)
CO2 SERPL-SCNC: 25 MMOL/L — SIGNIFICANT CHANGE UP (ref 22–31)
CO2 SERPL-SCNC: 25 MMOL/L — SIGNIFICANT CHANGE UP (ref 22–31)
CREAT SERPL-MCNC: 7.8 MG/DL — HIGH (ref 0.5–1.3)
CREAT SERPL-MCNC: 7.8 MG/DL — HIGH (ref 0.5–1.3)
EGFR: 8 ML/MIN/1.73M2 — LOW
EGFR: 8 ML/MIN/1.73M2 — LOW
GLUCOSE SERPL-MCNC: 164 MG/DL — HIGH (ref 70–99)
GLUCOSE SERPL-MCNC: 164 MG/DL — HIGH (ref 70–99)
HCT VFR BLD CALC: 28.1 % — LOW (ref 39–50)
HCT VFR BLD CALC: 28.1 % — LOW (ref 39–50)
HGB BLD-MCNC: 8.7 G/DL — LOW (ref 13–17)
HGB BLD-MCNC: 8.7 G/DL — LOW (ref 13–17)
MCHC RBC-ENTMCNC: 30.2 PG — SIGNIFICANT CHANGE UP (ref 27–34)
MCHC RBC-ENTMCNC: 30.2 PG — SIGNIFICANT CHANGE UP (ref 27–34)
MCHC RBC-ENTMCNC: 31 GM/DL — LOW (ref 32–36)
MCHC RBC-ENTMCNC: 31 GM/DL — LOW (ref 32–36)
MCV RBC AUTO: 97.6 FL — SIGNIFICANT CHANGE UP (ref 80–100)
MCV RBC AUTO: 97.6 FL — SIGNIFICANT CHANGE UP (ref 80–100)
NRBC # BLD: 0 /100 WBCS — SIGNIFICANT CHANGE UP (ref 0–0)
NRBC # BLD: 0 /100 WBCS — SIGNIFICANT CHANGE UP (ref 0–0)
PLATELET # BLD AUTO: 633 K/UL — HIGH (ref 150–400)
PLATELET # BLD AUTO: 633 K/UL — HIGH (ref 150–400)
POTASSIUM SERPL-MCNC: 4.1 MMOL/L — SIGNIFICANT CHANGE UP (ref 3.5–5.3)
POTASSIUM SERPL-MCNC: 4.1 MMOL/L — SIGNIFICANT CHANGE UP (ref 3.5–5.3)
POTASSIUM SERPL-SCNC: 4.1 MMOL/L — SIGNIFICANT CHANGE UP (ref 3.5–5.3)
POTASSIUM SERPL-SCNC: 4.1 MMOL/L — SIGNIFICANT CHANGE UP (ref 3.5–5.3)
RBC # BLD: 2.88 M/UL — LOW (ref 4.2–5.8)
RBC # BLD: 2.88 M/UL — LOW (ref 4.2–5.8)
RBC # FLD: 15.7 % — HIGH (ref 10.3–14.5)
RBC # FLD: 15.7 % — HIGH (ref 10.3–14.5)
SODIUM SERPL-SCNC: 137 MMOL/L — SIGNIFICANT CHANGE UP (ref 135–145)
SODIUM SERPL-SCNC: 137 MMOL/L — SIGNIFICANT CHANGE UP (ref 135–145)
WBC # BLD: 14.3 K/UL — HIGH (ref 3.8–10.5)
WBC # BLD: 14.3 K/UL — HIGH (ref 3.8–10.5)
WBC # FLD AUTO: 14.3 K/UL — HIGH (ref 3.8–10.5)
WBC # FLD AUTO: 14.3 K/UL — HIGH (ref 3.8–10.5)

## 2023-10-21 RX ORDER — MIDODRINE HYDROCHLORIDE 2.5 MG/1
10 TABLET ORAL THREE TIMES A DAY
Refills: 0 | Status: DISCONTINUED | OUTPATIENT
Start: 2023-10-21 | End: 2023-10-22

## 2023-10-21 RX ADMIN — MIDODRINE HYDROCHLORIDE 20 MILLIGRAM(S): 2.5 TABLET ORAL at 06:24

## 2023-10-21 RX ADMIN — OXYCODONE HYDROCHLORIDE 5 MILLIGRAM(S): 5 TABLET ORAL at 00:39

## 2023-10-21 RX ADMIN — Medication 1 TABLET(S): at 18:05

## 2023-10-21 RX ADMIN — Medication 4: at 08:34

## 2023-10-21 RX ADMIN — DORZOLAMIDE HYDROCHLORIDE TIMOLOL MALEATE 1 DROP(S): 20; 5 SOLUTION/ DROPS OPHTHALMIC at 06:23

## 2023-10-21 RX ADMIN — OXYCODONE HYDROCHLORIDE 5 MILLIGRAM(S): 5 TABLET ORAL at 18:26

## 2023-10-21 RX ADMIN — Medication 200 MILLIGRAM(S): at 06:24

## 2023-10-21 RX ADMIN — OXYCODONE HYDROCHLORIDE 5 MILLIGRAM(S): 5 TABLET ORAL at 23:02

## 2023-10-21 RX ADMIN — OXYCODONE HYDROCHLORIDE 5 MILLIGRAM(S): 5 TABLET ORAL at 06:54

## 2023-10-21 RX ADMIN — HEPARIN SODIUM 5000 UNIT(S): 5000 INJECTION INTRAVENOUS; SUBCUTANEOUS at 06:20

## 2023-10-21 RX ADMIN — Medication 1: at 12:46

## 2023-10-21 RX ADMIN — ATORVASTATIN CALCIUM 40 MILLIGRAM(S): 80 TABLET, FILM COATED ORAL at 22:59

## 2023-10-21 RX ADMIN — OXYCODONE HYDROCHLORIDE 5 MILLIGRAM(S): 5 TABLET ORAL at 23:32

## 2023-10-21 RX ADMIN — OXYCODONE HYDROCHLORIDE 5 MILLIGRAM(S): 5 TABLET ORAL at 06:24

## 2023-10-21 RX ADMIN — GABAPENTIN 100 MILLIGRAM(S): 400 CAPSULE ORAL at 23:01

## 2023-10-21 RX ADMIN — OXYCODONE HYDROCHLORIDE 5 MILLIGRAM(S): 5 TABLET ORAL at 18:04

## 2023-10-21 RX ADMIN — Medication 5 UNIT(S): at 08:35

## 2023-10-21 RX ADMIN — DORZOLAMIDE HYDROCHLORIDE TIMOLOL MALEATE 1 DROP(S): 20; 5 SOLUTION/ DROPS OPHTHALMIC at 18:04

## 2023-10-21 RX ADMIN — INSULIN GLARGINE 13 UNIT(S): 100 INJECTION, SOLUTION SUBCUTANEOUS at 22:59

## 2023-10-21 RX ADMIN — OXYCODONE HYDROCHLORIDE 5 MILLIGRAM(S): 5 TABLET ORAL at 12:22

## 2023-10-21 RX ADMIN — Medication 5 UNIT(S): at 12:45

## 2023-10-21 RX ADMIN — Medication 1: at 23:00

## 2023-10-21 RX ADMIN — Medication 1 TABLET(S): at 06:24

## 2023-10-21 RX ADMIN — Medication 5 UNIT(S): at 18:01

## 2023-10-21 RX ADMIN — GABAPENTIN 100 MILLIGRAM(S): 400 CAPSULE ORAL at 06:24

## 2023-10-21 RX ADMIN — OXYCODONE HYDROCHLORIDE 5 MILLIGRAM(S): 5 TABLET ORAL at 12:08

## 2023-10-21 RX ADMIN — GABAPENTIN 100 MILLIGRAM(S): 400 CAPSULE ORAL at 14:18

## 2023-10-21 RX ADMIN — OXYCODONE HYDROCHLORIDE 5 MILLIGRAM(S): 5 TABLET ORAL at 00:09

## 2023-10-21 RX ADMIN — HEPARIN SODIUM 5000 UNIT(S): 5000 INJECTION INTRAVENOUS; SUBCUTANEOUS at 18:05

## 2023-10-21 NOTE — SOCIAL WORK PROGRESS NOTE - NSSWPROGRESSNOTE_GEN_ALL_CORE
PT recommending ALDAIR as patient needed max assist to get up today.  I met with pt. at bedside and he appeared alert & oriented. States he lives with wife and son who both work. States he walked with prosthetics pta. Educated re. recommendation for ALDAIR. He refused despite my concerns and reasons why he could not be home alone. He requested . Met with him again and used [facility]  #[id number]. Again explained why ALDAIR was recommended and it is unsafe for him to be home without  24 hour care. He feels he can manage at home and use medicaid transport to get to dialysis tx and outpatient PT. Informed him he is near ready for discharge. He refused to let me contact family. Provided d/c planning folder including social work names & #s and [facility]DAIR with onsite dialysis list. MD informed.

## 2023-10-21 NOTE — PROGRESS NOTE ADULT - SUBJECTIVE AND OBJECTIVE BOX
PROGRESS NOTE  Patient is a 48y old  Male who presents with a chief complaint of weakness (21 Oct 2023 09:48)  Chart and available morning labs /imaging are reviewed electronically , urgent issues addressed . More information  is being added upon completion of rounds , when more information is collected and management discussed with consultants , medical staff and social service/case management on the floor   OVERNIGHT  No new issues reported by medical staff . 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 Sin/Dr. Singh) Presents to ED [facility] 10/10 with weakness for 2 weeks. 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 Wound care consult and ID consults are requested . (10 Oct 2023 12:52)    PAST MEDICAL & SURGICAL HISTORY:  ESRD on dialysis      H/O hypotension      Diabetes mellitus      Leg wound, left      Steal syndrome dialysis vascular access      Gangrene of finger of right hand      PVD (peripheral vascular disease)      Anemia of chronic disease      Constipation      HLD (hyperlipidemia)      S/P BKA (below knee amputation) bilateral      AV fistula          MEDICATIONS  (STANDING):  atorvastatin 40 milliGRAM(s) Oral at bedtime  cefpodoxime 200 milliGRAM(s) Oral every 24 hours  dextrose 50% Injectable 12.5 Gram(s) IV Push once  dextrose 50% Injectable 25 Gram(s) IV Push once  dorzolamide 2%/timolol 0.5% Ophthalmic Solution 1 Drop(s) Both EYES two times a day  epoetin deidre (PROCRIT) Injectable 11138 Unit(s) IV Push <User Schedule>  gabapentin 100 milliGRAM(s) Oral three times a day  glucagon  Injectable 1 milliGRAM(s) IntraMuscular once  heparin   Injectable 5000 Unit(s) SubCutaneous every 12 hours  insulin glargine Injectable (LANTUS) 13 Unit(s) SubCutaneous at bedtime  insulin lispro (ADMELOG) corrective regimen sliding scale   SubCutaneous Before meals and at bedtime  insulin lispro Injectable (ADMELOG) 5 Unit(s) SubCutaneous three times a day before meals  lactobacillus acidophilus 1 Tablet(s) Oral every 12 hours  midodrine. 20 milliGRAM(s) Oral three times a day  oxyCODONE    IR 5 milliGRAM(s) Oral every 6 hours    MEDICATIONS  (PRN):  acetaminophen     Tablet .. 650 milliGRAM(s) Oral every 4 hours PRN Temp greater or equal to 38C (100.4F), Mild Pain (1 - 3)  aluminum hydroxide/magnesium hydroxide/simethicone Suspension 30 milliLiter(s) Oral every 4 hours PRN Dyspepsia  HYDROmorphone  Injectable 0.5 milliGRAM(s) IV Push every 4 hours PRN Severe Pain (7 - 10)  melatonin 3 milliGRAM(s) Oral at bedtime PRN Insomnia      OBJECTIVE    T(C): 36.9 (10-21-23 @ 11:54), Max: 37.1 (10-21-23 @ 05:00)  HR: 66 (10-21-23 @ 11:54) (66 - 81)  BP: 138/71 (10-21-23 @ 11:54) (104/64 - 147/67)  RR: 18 (10-21-23 @ 11:54) (17 - 18)  SpO2: 98% (10-21-23 @ 11:54) (94% - 98%)  Wt(kg): --  I&O's Summary    20 Oct 2023 07:01  -  21 Oct 2023 07:00  --------------------------------------------------------  IN: 800 mL / OUT: 2300 mL / NET: -1500 mL          REVIEW OF SYSTEMS:  CONSTITUTIONAL: No fever, weight loss, or fatigue  EYES: No eye pain, visual disturbances, or discharge  ENMT:   No sinus or throat pain  NECK: No pain or stiffness  RESPIRATORY: No cough, wheezing, chills or hemoptysis; No shortness of breath  CARDIOVASCULAR: No chest pain, palpitations, dizziness, or leg swelling  GASTROINTESTINAL: No abdominal pain. No nausea, vomiting; No diarrhea or constipation. No melena or hematochezia.  GENITOURINARY: No dysuria, frequency, hematuria, or incontinence  NEUROLOGICAL: No headaches, memory loss, loss of strength, numbness, or tremors  SKIN: No itching, burning, rashes, or lesions   MUSCULOSKELETAL: No joint pain or swelling; No muscle, back, or extremity pain    PHYSICAL EXAM:  Appearance: NAD. VS past 24 hrs -as above   HEENT:   Moist oral mucosa. Conjunctiva clear b/l.   Neck : supple  Respiratory: Lungs CTAB.  Gastrointestinal:  Soft, nontender. No rebound. No rigidity. BS present	  Cardiovascular: RRR ,S1S2 present  Neurologic: Non-focal. Moving all extremities.  Extremities: No edema. No erythema. No calf tenderness.  Skin: No rashes, No ecchymoses, No cyanosis.	  wounds ,skin lesions-See skin assesment flow sheet   LABS:                        8.7    14.30 )-----------( 633      ( 21 Oct 2023 11:19 )             28.1     10-21    137  |  100  |  37<H>  ----------------------------<  164<H>  4.1   |  25  |  7.80<H>    Ca    8.8      21 Oct 2023 11:19      CAPILLARY BLOOD GLUCOSE      POCT Blood Glucose.: 196 mg/dL (21 Oct 2023 12:42)  POCT Blood Glucose.: 311 mg/dL (21 Oct 2023 08:21)  POCT Blood Glucose.: 307 mg/dL (20 Oct 2023 21:27)  POCT Blood Glucose.: 361 mg/dL (20 Oct 2023 17:27)      Urinalysis Basic - ( 21 Oct 2023 11:19 )    Color: x / Appearance: x / SG: x / pH: x  Gluc: 164 mg/dL / Ketone: x  / Bili: x / Urobili: x   Blood: x / Protein: x / Nitrite: x   Leuk Esterase: x / RBC: x / WBC x   Sq Epi: x / Non Sq Epi: x / Bacteria: x        Culture - Surgical Swab (collected 11 Oct 2023 13:51)  Source: .Surgical Swab Surgical Swab  Final Report (14 Oct 2023 15:54):    Moderate Escherichia coli    Few Streptococcus agalactiae (Group B) Streptococcus agalactiae (Group B)    isolated    Group B streptococci are susceptible to ampicillin,    penicillin and cefazolin, but may be resistant to    erythromycin and clindamycin.    Rare Streptococcus mitis/oralis group "Susceptibilities not performed"    Numerous Actinomyces odontolyticus "Susceptibilities not performed"    Few Streptococcus agalactiae (Group B) isolated    Group B streptococci are susceptible to ampicillin,    penicillin and cefazolin, but may be resistant to    erythromycin and clindamycin.  Organism: Escherichia coli (14 Oct 2023 15:54)  Organism: Escherichia coli (14 Oct 2023 15:54)      RADIOLOGY & ADDITIONAL TESTS:   reviewed elctronically  ASSESSMENT/PLAN: 	    25 minutes aggregate time was spent on this visit, 50% visit time spent in care co-ordination with other attendings and counselling patient .I have discussed care plan with patient / HCP/family member ,who expressed understanding of problems treatment and their effect and side effects, alternatives in details. I have asked if they have any questions and concerns and appropriately addressed them to best of my ability.

## 2023-10-21 NOTE — PROGRESS NOTE ADULT - ASSESSMENT
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 Sin/Dr. Singh) Presents to ED [facility] 10/10 with weakness for 2 weeks. 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 Wound care consult and ID consults are requested . (10 Oct 2023 12:52)      esrd on hd for mwf   Excess fluids and waste products will be removed from your blood; your electrolytes will be balanced; your blood pressure will be controlled.    Admit for septic workup and ID evaluation,send blood and urine cx,serial lactate levels,monitor vitals closley,ivfs hydration,monitor urine output and renal profile,iv abx as per id cons  cefpodoxime 200 milliGRAM(s) Oral every 24 hours    BP monitoring,continue current  meds, low salt diet,followup with PMD in 1-2 weeks  midodrine. 10 milliGRAM(s) Oral three times a day        ANEMIA PLAN:  Anemia of chronic disease:  We will continue epo aiming for a HCT of 32-36 %.   We will monitor Iron stores, B12 and RBC folate .    dvt heparin   Injectable 5000 Unit(s) SubCutaneous every 12 hours  Taken to OR for urgent left knee disarticulation for gas gangrne  POD#4

## 2023-10-21 NOTE — PROGRESS NOTE ADULT - SUBJECTIVE AND OBJECTIVE BOX
SURGERY PA NOTE ON BEHALF OF DR. Aguilar /Vascular SURGERY:    S: Patient seen and examined at bedside.     Pain well controlled.   Dressing was changed yesterday.   No acute complaints. Denies chest pain, SOB, light-headed, dizziness.     MEDICATIONS:  acetaminophen     Tablet .. 650 milliGRAM(s) Oral every 4 hours PRN  aluminum hydroxide/magnesium hydroxide/simethicone Suspension 30 milliLiter(s) Oral every 4 hours PRN  atorvastatin 40 milliGRAM(s) Oral at bedtime  cefpodoxime 200 milliGRAM(s) Oral every 24 hours  dextrose 50% Injectable 12.5 Gram(s) IV Push once  dextrose 50% Injectable 25 Gram(s) IV Push once  dorzolamide 2%/timolol 0.5% Ophthalmic Solution 1 Drop(s) Both EYES two times a day  epoetin deidre (PROCRIT) Injectable 29766 Unit(s) IV Push <User Schedule>  gabapentin 100 milliGRAM(s) Oral three times a day  glucagon  Injectable 1 milliGRAM(s) IntraMuscular once  heparin   Injectable 5000 Unit(s) SubCutaneous every 12 hours  HYDROmorphone  Injectable 0.5 milliGRAM(s) IV Push every 4 hours PRN  insulin glargine Injectable (LANTUS) 13 Unit(s) SubCutaneous at bedtime  insulin lispro (ADMELOG) corrective regimen sliding scale   SubCutaneous Before meals and at bedtime  insulin lispro Injectable (ADMELOG) 5 Unit(s) SubCutaneous three times a day before meals  lactobacillus acidophilus 1 Tablet(s) Oral every 12 hours  melatonin 3 milliGRAM(s) Oral at bedtime PRN  midodrine. 20 milliGRAM(s) Oral three times a day  oxyCODONE    IR 5 milliGRAM(s) Oral every 6 hours      O:  Vital Signs Last 24 Hrs  T(C): 36.9 (21 Oct 2023 11:54), Max: 37.1 (21 Oct 2023 05:00)  T(F): 98.5 (21 Oct 2023 11:54), Max: 98.7 (21 Oct 2023 05:00)  HR: 66 (21 Oct 2023 11:54) (66 - 81)  BP: 138/71 (21 Oct 2023 11:54) (104/64 - 147/67)  BP(mean): --  RR: 18 (21 Oct 2023 11:54) (17 - 18)  SpO2: 98% (21 Oct 2023 11:54) (94% - 98%)    Parameters below as of 21 Oct 2023 11:54  Patient On (Oxygen Delivery Method): room air        I&O SUMMARY:    10-20-23 @ 07:01  -  10-21-23 @ 07:00  --------------------------------------------------------  IN: 800 mL / OUT: 2300 mL / NET: -1500 mL        PHYSICAL EXAM:  General: NAD, resting comfortably in bed  Pulmonary: Nonlabored breathing, no respiratory distress, CTA  Cardiovascular: NSR  Abdominal: soft, NT/ND  Extremities: L stump in dressing and ACE wrap.        LABS:                        8.7    14.30 )-----------( 633      ( 21 Oct 2023 11:19 )             28.1     10-21    137  |  100  |  37<H>  ----------------------------<  164<H>  4.1   |  25  |  7.80<H>    Ca    8.8      21 Oct 2023 11:19            Assessment and Plan:   · Assessment	  47 yo male with multiple comorbidities and bilateral BKA was consulted for necrotizing fascitis.      Taken to OR for urgent left knee disarticulation for gas gangrne  POD #9  Now S/P L AKA          POD#2  Pain controlled with current regimen  DM management  DVT ppx  PT/OT  Daily dressing changes with DSD  HD as per renal  Vascular surgery following.

## 2023-10-21 NOTE — CONSULT NOTE ADULT - CONSULT REQUESTED DATE/TIME
10-Oct-2023
10-Oct-2023 23:43
21-Oct-2023 00:54
11-Oct-2023
10-Oct-2023 17:31
11-Oct-2023 16:38
10-Oct-2023
10-Oct-2023 20:36
10-Oct-2023

## 2023-10-21 NOTE — CONSULT NOTE ADULT - ASSESSMENT
Istop reviewed Reference #: [id number]    Gabapentin adjusted for renal compromise  Discontinued oxycodone due to renal compromise, initiated dilaudid    Patient on Oxy 5mg Q6H at home PRN = 30MME daily  Started dilaudid 2mg Q6H at this time, 32MME daily  Also made tylenol 1000mg TID standing order.     If not adequate, will titrate upwards.    Will monitor. 48M with L knee disarticulation, complaining of post op pain, phantom pain, phantom sensation    Istop reviewed Reference #: [id number]  Gabapentin adjusted for renal compromise to BID dosing  Discontinued oxycodone due to renal compromise, initiated dilaudid  Patient on Oxy 5mg Q6H at home PRN = 30MME daily  Started dilaudid 2mg Q6H at this time, 32MME daily  Also made tylenol 1000mg TID standing order  If not adequate, will titrate upwards. Will monitor.  Functionally, he is a candidate for subacute rehabilitation, will need PT and OT to address functional deficits.    55 minutes spent during patient encounter:    ¦ preparing to see the patient   ¦ performing a medically appropriate examination and/or evaluation   ¦ counseling and educating the patient/family/caregiver   ¦ ordering medications, tests, or procedures   ¦ referring and communicating with other health care professionals  ¦ documenting clinical information in the electronic or other health record

## 2023-10-21 NOTE — PHYSICAL THERAPY INITIAL EVALUATION ADULT - PERTINENT HX OF CURRENT PROBLEM, REHAB EVAL
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 Sin/Dr. Singh) Presents to ED [facility] 10/10 with weakness for 2 weeks. pt is dialysis patient M/W/F last dialysis was yesterday but pt has been feeling weak for the last 2 weeks, no cough, fever, chills, no vomiting or diarrhea, pt also has had an open wound under his left thigh that has been neglected for the last few weeks, draining pus and with redness Earler this year he was admitted with vascular steal syndrome c/b R middle finger and forearm gas gangrene s/p amputation and multiple debridements (last 3/16/23) and with associated OM , stabilized s/p debrident and on IV antibiotics .Patient was found to have hypotension and lacticemia , s/p 1500 iv fluids in er , no further iv fluids as per Dr Castillo nephrologist.

## 2023-10-21 NOTE — CONSULT NOTE ADULT - PROVIDER SPECIALTY LIST ADULT
Critical Care
Cardiology
Pulmonology
Palliative Care
Vascular Surgery
Infectious Disease
Physiatry
Nephrology
Wound Care

## 2023-10-21 NOTE — PROGRESS NOTE ADULT - SUBJECTIVE AND OBJECTIVE BOX
Patient is a 48y Male whom presented to the hospital with esrd on hd     PAST MEDICAL & SURGICAL HISTORY:  ESRD on dialysis      H/O hypotension      Diabetes mellitus      Leg wound, left      Steal syndrome dialysis vascular access      Gangrene of finger of right hand      PVD (peripheral vascular disease)      Anemia of chronic disease      Constipation      HLD (hyperlipidemia)      S/P BKA (below knee amputation) bilateral      AV fistula          MEDICATIONS  (STANDING):  atorvastatin 40 milliGRAM(s) Oral at bedtime  collagenase Ointment 1 Application(s) Topical daily  dextrose 5%. 1000 milliLiter(s) (50 mL/Hr) IV Continuous <Continuous>  dextrose 5%. 1000 milliLiter(s) (100 mL/Hr) IV Continuous <Continuous>  dextrose 50% Injectable 25 Gram(s) IV Push once  dextrose 50% Injectable 25 Gram(s) IV Push once  dextrose 50% Injectable 12.5 Gram(s) IV Push once  dorzolamide 2%/timolol 0.5% Ophthalmic Solution 1 Drop(s) Both EYES two times a day  glucagon  Injectable 1 milliGRAM(s) IntraMuscular once  heparin   Injectable 5000 Unit(s) SubCutaneous every 12 hours  insulin lispro (ADMELOG) corrective regimen sliding scale   SubCutaneous every 6 hours  lactobacillus acidophilus 1 Tablet(s) Oral every 12 hours  midodrine. 10 milliGRAM(s) Oral three times a day  pantoprazole    Tablet 40 milliGRAM(s) Oral before breakfast  piperacillin/tazobactam IVPB.. 3.375 Gram(s) IV Intermittent every 12 hours  senna 2 Tablet(s) Oral at bedtime      Allergies    No Known Drug Allergies  [place] (Rash)    Intolerances        SOCIAL HISTORY:  Denies ETOh,Smoking,     FAMILY HISTORY:      REVIEW OF SYSTEMS:    CONSTITUTIONAL: No weakness, fevers or chills  EYES/ENT: No visual changes;  no throat pain   NECK: No pain or stiffness  RESPIRATORY: No cough, wheezing, hemoptysis; No shortness of breath  CARDIOVASCULAR: No chest pain or palpitations  GASTROINTESTINAL: No abdominal or epigastric pain. No nausea, vomiting,                                                    8.7    14.30 )-----------( 633      ( 21 Oct 2023 11:19 )             28.1       CBC Full  -  ( 21 Oct 2023 11:19 )  WBC Count : 14.30 K/uL  RBC Count : 2.88 M/uL  Hemoglobin : 8.7 g/dL  Hematocrit : 28.1 %  Platelet Count - Automated : 633 K/uL  Mean Cell Volume : 97.6 fl  Mean Cell Hemoglobin : 30.2 pg  Mean Cell Hemoglobin Concentration : 31.0 gm/dL  Auto Neutrophil # : x  Auto Lymphocyte # : x  Auto Monocyte # : x  Auto Eosinophil # : x  Auto Basophil # : x  Auto Neutrophil % : x  Auto Lymphocyte % : x  Auto Monocyte % : x  Auto Eosinophil % : x  Auto Basophil % : x      10-21    137  |  100  |  37<H>  ----------------------------<  164<H>  4.1   |  25  |  7.80<H>    Ca    8.8      21 Oct 2023 11:19        CAPILLARY BLOOD GLUCOSE      POCT Blood Glucose.: 196 mg/dL (21 Oct 2023 12:42)  POCT Blood Glucose.: 311 mg/dL (21 Oct 2023 08:21)  POCT Blood Glucose.: 307 mg/dL (20 Oct 2023 21:27)  POCT Blood Glucose.: 361 mg/dL (20 Oct 2023 17:27)      Vital Signs Last 24 Hrs  T(C): 36.9 (21 Oct 2023 11:54), Max: 37.1 (21 Oct 2023 05:00)  T(F): 98.5 (21 Oct 2023 11:54), Max: 98.7 (21 Oct 2023 05:00)  HR: 66 (21 Oct 2023 11:54) (66 - 81)  BP: 138/71 (21 Oct 2023 11:54) (104/64 - 147/67)  BP(mean): --  RR: 18 (21 Oct 2023 11:54) (17 - 18)  SpO2: 98% (21 Oct 2023 11:54) (94% - 98%)    Parameters below as of 21 Oct 2023 11:54  Patient On (Oxygen Delivery Method): room air        Urinalysis Basic - ( 21 Oct 2023 11:19 )    Color: x / Appearance: x / SG: x / pH: x  Gluc: 164 mg/dL / Ketone: x  / Bili: x / Urobili: x   Blood: x / Protein: x / Nitrite: x   Leuk Esterase: x / RBC: x / WBC x   Sq Epi: x / Non Sq Epi: x / Bacteria: x                        PHYSICAL EXAM:    Constitutional: NAD  HEENT: conjunctive   clear   Neck:  No JVD  Respiratory: CTAB  Cardiovascular: S1 and S2  Gastrointestinal: BS+, soft, NT/ND  Extremities: No peripheral edema  Neurological: no focal deficits  Psychiatric: Normal mood, normal affect  : No [place]  Skin: No rashes   S/P BKA (below knee amputation) bilateral  AV fistula  LABS:

## 2023-10-21 NOTE — PROGRESS NOTE ADULT - SUBJECTIVE AND OBJECTIVE BOX
Date/Time Patient Seen:  		  Referring MD:   Data Reviewed	       Patient is a 48y old  Male who presents with a chief complaint of weakness (20 Oct 2023 20:56)      Subjective/HPI     PAST MEDICAL & SURGICAL HISTORY:  ESRD on dialysis    H/O hypotension    Diabetes mellitus    Leg wound, left    Steal syndrome dialysis vascular access    Gangrene of finger of right hand    PVD (peripheral vascular disease)    Anemia of chronic disease    Constipation    HLD (hyperlipidemia)    S/P BKA (below knee amputation) bilateral    AV fistula          Medication list         MEDICATIONS  (STANDING):  atorvastatin 40 milliGRAM(s) Oral at bedtime  cefpodoxime 200 milliGRAM(s) Oral every 24 hours  dextrose 50% Injectable 25 Gram(s) IV Push once  dextrose 50% Injectable 12.5 Gram(s) IV Push once  dorzolamide 2%/timolol 0.5% Ophthalmic Solution 1 Drop(s) Both EYES two times a day  epoetin deidre (PROCRIT) Injectable 88333 Unit(s) IV Push <User Schedule>  gabapentin 100 milliGRAM(s) Oral three times a day  glucagon  Injectable 1 milliGRAM(s) IntraMuscular once  heparin   Injectable 5000 Unit(s) SubCutaneous every 12 hours  insulin glargine Injectable (LANTUS) 13 Unit(s) SubCutaneous at bedtime  insulin lispro (ADMELOG) corrective regimen sliding scale   SubCutaneous Before meals and at bedtime  insulin lispro Injectable (ADMELOG) 5 Unit(s) SubCutaneous three times a day before meals  lactobacillus acidophilus 1 Tablet(s) Oral every 12 hours  midodrine. 20 milliGRAM(s) Oral three times a day  oxyCODONE    IR 5 milliGRAM(s) Oral every 6 hours    MEDICATIONS  (PRN):  acetaminophen     Tablet .. 650 milliGRAM(s) Oral every 4 hours PRN Temp greater or equal to 38C (100.4F), Mild Pain (1 - 3)  aluminum hydroxide/magnesium hydroxide/simethicone Suspension 30 milliLiter(s) Oral every 4 hours PRN Dyspepsia  HYDROmorphone  Injectable 0.5 milliGRAM(s) IV Push every 4 hours PRN Severe Pain (7 - 10)  melatonin 3 milliGRAM(s) Oral at bedtime PRN Insomnia         Vitals log        ICU Vital Signs Last 24 Hrs  T(C): 37.1 (21 Oct 2023 05:00), Max: 37.1 (21 Oct 2023 05:00)  T(F): 98.7 (21 Oct 2023 05:00), Max: 98.7 (21 Oct 2023 05:00)  HR: 70 (21 Oct 2023 05:00) (66 - 81)  BP: 147/67 (21 Oct 2023 05:00) (104/64 - 147/67)  BP(mean): --  ABP: --  ABP(mean): --  RR: 17 (21 Oct 2023 05:00) (17 - 18)  SpO2: 97% (21 Oct 2023 05:00) (94% - 100%)    O2 Parameters below as of 21 Oct 2023 05:00  Patient On (Oxygen Delivery Method): room air                 Input and Output:  I&O's Detail    20 Oct 2023 07:01  -  21 Oct 2023 05:24  --------------------------------------------------------  IN:    Other (mL): 800 mL  Total IN: 800 mL    OUT:    Other (mL): 2300 mL  Total OUT: 2300 mL    Total NET: -1500 mL          Lab Data                        10.4   10.98 )-----------( 597      ( 19 Oct 2023 07:30 )             33.0     10-19    135  |  99  |  24<H>  ----------------------------<  198<H>  4.0   |  26  |  6.70<H>    Ca    9.5      19 Oct 2023 07:30              Review of Systems	      Objective     Physical Examination  heart s1s2  lung dc BS  head nc        Pertinent Lab findings & Imaging      Barak:  NO   Adequate UO     I&O's Detail    20 Oct 2023 07:01  -  21 Oct 2023 05:24  --------------------------------------------------------  IN:    Other (mL): 800 mL  Total IN: 800 mL    OUT:    Other (mL): 2300 mL  Total OUT: 2300 mL    Total NET: -1500 mL               Discussed with:     Cultures:	        Radiology

## 2023-10-21 NOTE — PROGRESS NOTE ADULT - NS ATTEND AMEND GEN_ALL_CORE FT
Patient evaluated at the bedside. For dressing change in AM. Mod amount of pain after PT.
Seen and examined at bedside, pain controlled  WBC downtrending  Will plan for formalization next week
LLE BKA stump gas gangrene s/p emergent knee disarticulation  Will formalize next week  Supportive care appreciated
return to OR for AKA formalization  Afebrile, leukocytosis resolved  Appreciate medical/cardiac clearance
Seen and examined at bedside, pain controlled  WBC downtrending  Will plan for formalization next week

## 2023-10-21 NOTE — PROGRESS NOTE ADULT - ASSESSMENT
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 Sin/Dr. Singh) Presents to ED [facility] 10/10 with weakness for 2 weeks. 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 Wound care consult and ID consults are requested .

## 2023-10-21 NOTE — PROGRESS NOTE ADULT - ASSESSMENT
REASON FOR VISIT  .. Management of problems listed below        REVIEW OF SYMPTOMS   Able to give ROS  Yes     RELIABILITY +/-   CONSTITUTIONAL Weakness Yes    ENDOCRINE  No heat or cold intolerance    ALLERGY No hives  Sore throat No stridor  RESP Shortness of breath YES   NEURO New weakness No   CARDIAC   Palpitations No         PHYSICAL EXAM    HEENT Unremarkable  atraumatic   RESP Fair air entry  Harsh breath sound   CARDIAC S1 S2 No S3     NO JVD    ABDOMEN No hepatosplenomegaly   bl bka  NO rash       GENERAL DATA .     GOC.  .. 10/10/2023 full code  ICU STAY. .. 10/11-10/18/2023   COVID. ..  scv2 10/10/2023 (-)  BEST PRACTICE ISSUES.    HOB ELEVATN. .. Yes  DVT PPLX. ..  10/10/2023 [facility]    SPEECH SWALLOW RECOMMENDATIONS.    ..        DIET.   ..  10/11 renal restrn   ..  10/10/2023 npo   IV fl ...   BOLUS.   .. 10/11/2023 4a ns 500   .. 10/10/2023 4p ns 250   .. 10/10/2023 12p ns 500  .. 10/10/2023 9a ns 1000   STRESS ULCER PPLX. ..    10/10/2023 protonix 40  INFECTION PPLX. ..   10/13 mupirocin 2%   ALLGY. ..   turkey                      WT. ..  10/10/2023 59  BMI. ..      PROCEDURES.  .. 10/11/2023 l knee disarticulation     ABGS.   .     VS/ PO/IO/ VENT/ DRIPS.  10/21/2023 afeb 66 130/70     SUMMARY.   . 48 m PMH esrd admitted 10/10 with shock found to have necroitizing fascitis   . Pt had l knee disarticulation done 10/11     OVERALL PLAN  . SSTI   . NECROTIZING FASCITIS 10/11/2023   .. 10/10 l post knee pressure ulcer   .. 10/11 surg strep agalacticae gp b e coli sens piptaz   .. Givn vanco started 10/10 On Vanco started 10/10   .. 10/10-10/19/2023 zosyn   .. 10/19/2023 cefpodoxime   . SHOCK  now resolvd   .. 10/26 echo ef 56% n lvdd   .. 10/12-10/14/2023 Nore    .. Target MAP 65 (+)  . SP Urgent DISARTICULATION l knee 10/11   ..  RTOR 10/19 for AKA closure   .. SIGN OFF   .. 10/21/2023 Pt stable from Pulm standpoint Will sgn off Please reconsult as needed    TIME SPENT.   . Over 36 minutes aggregate care time spent on encounter; activities included   direct patient care, counseling and/or coordinating care reviewing notes, lab data/ imaging , discussion with multidisciplinary team/ patient  /family and explaining in detail risks, benefits, alternatives  of the recommendations     MICHAEL TIRADO 48 m 10/10/2023 [address] DR ELENA SCHM[address]ER DR MOHSEN PAHLAVAN

## 2023-10-21 NOTE — PROGRESS NOTE ADULT - SUBJECTIVE AND OBJECTIVE BOX
CHIEF COMPLAINT/ REASON FOR VISIT  .. Patient was seen to address the  issue listed under PROBLEM LIST which is located toward bottom of this note     MELVI GREGORYSERA    PLV 3WES 366 W1    Allergies    No Known Drug Allergies  [place] (Rash)    Intolerances        PAST MEDICAL & SURGICAL HISTORY:  ESRD on dialysis      H/O hypotension      Diabetes mellitus      Leg wound, left      Steal syndrome dialysis vascular access      Gangrene of finger of right hand      PVD (peripheral vascular disease)      Anemia of chronic disease      Constipation      HLD (hyperlipidemia)      S/P BKA (below knee amputation) bilateral      AV fistula          FAMILY HISTORY:      Home Medications:  Admelog 100 units/mL injectable solution: 4 unit(s) subcutaneously 3 times a day (10 Oct 2023 14:32)  amLODIPine 5 mg oral tablet: 1 tab(s) orally once a day (10 Oct 2023 14:32)  atorvastatin 40 mg oral tablet: 1 tab(s) orally once a day (10 Oct 2023 14:32)  insulin glargine 100 units/mL subcutaneous solution: 4 unit(s) subcutaneous once a day (at bedtime) (10 Oct 2023 14:32)  senna (sennosides) 8.6 mg oral tablet: 2 tab(s) orally once a day (at bedtime) (10 Oct 2023 14:32)  Velphoro 500 mg oral tablet, chewable: 3 tab(s) chewed 3 times a day (10 Oct 2023 14:32)      MEDICATIONS  (STANDING):  atorvastatin 40 milliGRAM(s) Oral at bedtime  cefpodoxime 200 milliGRAM(s) Oral every 24 hours  dextrose 50% Injectable 12.5 Gram(s) IV Push once  dextrose 50% Injectable 25 Gram(s) IV Push once  dorzolamide 2%/timolol 0.5% Ophthalmic Solution 1 Drop(s) Both EYES two times a day  epoetin deidre (PROCRIT) Injectable 15380 Unit(s) IV Push <User Schedule>  gabapentin 100 milliGRAM(s) Oral three times a day  glucagon  Injectable 1 milliGRAM(s) IntraMuscular once  heparin   Injectable 5000 Unit(s) SubCutaneous every 12 hours  insulin glargine Injectable (LANTUS) 13 Unit(s) SubCutaneous at bedtime  insulin lispro (ADMELOG) corrective regimen sliding scale   SubCutaneous Before meals and at bedtime  insulin lispro Injectable (ADMELOG) 5 Unit(s) SubCutaneous three times a day before meals  lactobacillus acidophilus 1 Tablet(s) Oral every 12 hours  midodrine. 20 milliGRAM(s) Oral three times a day  oxyCODONE    IR 5 milliGRAM(s) Oral every 6 hours    MEDICATIONS  (PRN):  acetaminophen     Tablet .. 650 milliGRAM(s) Oral every 4 hours PRN Temp greater or equal to 38C (100.4F), Mild Pain (1 - 3)  aluminum hydroxide/magnesium hydroxide/simethicone Suspension 30 milliLiter(s) Oral every 4 hours PRN Dyspepsia  HYDROmorphone  Injectable 0.5 milliGRAM(s) IV Push every 4 hours PRN Severe Pain (7 - 10)  melatonin 3 milliGRAM(s) Oral at bedtime PRN Insomnia      Diet, DASH/TLC:   Sodium & Cholesterol Restricted  Consistent Carbohydrate Evening Snack  For patients receiving Renal Replacement - No Protein Restr, No Conc K, No Conc Phos, Low Sodium (RENAL)  Supplement Feeding Modality:  Oral  Nepro Cans or Servings Per Day:  1       Frequency:  Three Times a day (10-19-23 @ 15:26) [Active]          Vital Signs Last 24 Hrs  T(C): 37.1 (21 Oct 2023 05:00), Max: 37.1 (21 Oct 2023 05:00)  T(F): 98.7 (21 Oct 2023 05:00), Max: 98.7 (21 Oct 2023 05:00)  HR: 70 (21 Oct 2023 05:00) (66 - 81)  BP: 138/76 (21 Oct 2023 06:21) (104/64 - 147/67)  BP(mean): --  RR: 17 (21 Oct 2023 05:00) (17 - 18)  SpO2: 97% (21 Oct 2023 05:00) (94% - 100%)    Parameters below as of 21 Oct 2023 05:00  Patient On (Oxygen Delivery Method): room air          10-20-23 @ 07:01  -  10-21-23 @ 07:00  --------------------------------------------------------  IN: 800 mL / OUT: 2300 mL / NET: -1500 mL              LABS:                    WBC:  WBC Count: 10.98 K/uL (10-19 @ 07:30)  WBC Count: 11.97 K/uL (10-18 @ 10:30)      MICROBIOLOGY:  RECENT CULTURES:                  Sodium:  Sodium: 135 mmol/L (10-19 @ 07:30)  Sodium: 137 mmol/L (10-18 @ 10:30)      6.70 mg/dL 10-19 @ 07:30  9.10 mg/dL 10-18 @ 10:30      Hemoglobin:  Hemoglobin: 10.4 g/dL (10-19 @ 07:30)  Hemoglobin: 8.7 g/dL (10-18 @ 10:30)      Platelets: Platelet Count - Automated: 597 K/uL (10-19 @ 07:30)  Platelet Count - Automated: 486 K/uL (10-18 @ 10:30)              RADIOLOGY & ADDITIONAL STUDIES:      MICROBIOLOGY:  RECENT CULTURES:

## 2023-10-21 NOTE — CONSULT NOTE ADULT - SUBJECTIVE AND OBJECTIVE BOX
Physical Medicine and Rehabilitation Initial Evaluation    Patients acute care records reviewed and are summarized as follows:     Patient is a 48y Male who is referred for pain mgmt. Patient is s/p L knee disarticulation due to lower extremity infection. Patient referred for pain mgmt. Complains of phantom pain, phantom sensation, and residual limb pain.     Medical studies/laboratory studies reviewed, including CBC and CMP.    ROS:  Constitutional: Denies fevers or chills  MSK/Neuro: Residual limb, phantom pain and phantom sensation    PAST MEDICAL & SURGICAL HISTORY:  ESRD on dialysis  H/O hypotension  Diabetes mellitus  Leg wound, left  Steal syndrome dialysis vascular access  Gangrene of finger of right hand  PVD (peripheral vascular disease)  Anemia of chronic disease  Constipation  HLD (hyperlipidemia)  S/P BKA (below knee amputation) bilateral  AV fistula    Medications: reviewed      Physical Exam:   Vitals reviewed    Constitutional: Gen: In no acute distress, cooperative with exam and questioning   Neuro: Cranial Nerves II-XII intact, sensation intact to light touch in peripheral upper and lower extremities  MSK: R BKA, L Knee disarticulation, sensation intact bilateral residual limbs, several digits amputated in the RUE.   Psychiatric: Awake alert fully oriented

## 2023-10-21 NOTE — CONSULT NOTE ADULT - CONSULT REASON
cardiac arrythmias
esrd  anemia  weakness  pvd
esrd on hd
left knee wound
shoc
Evaluation for sepsis
Pain mgmt
evaluation of LLE wound , r/o necrotising fascitis
septic shock

## 2023-10-21 NOTE — PROGRESS NOTE ADULT - ASSESSMENT
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 Sin/Dr. Singh) Presents to ED [place] 10/10 with weakness for 2 weeks    anemia  esrd  weakness  DM  BKA  PAD  PVD  Pain    on ABX  POD 2  AKA  vs noted  labs reviewed    full code  lives with family at home - in [facility] as per renal  pain relief  oral hygiene  skin care  wound care  assist with needs

## 2023-10-22 LAB
ANION GAP SERPL CALC-SCNC: 11 MMOL/L — SIGNIFICANT CHANGE UP (ref 5–17)
ANION GAP SERPL CALC-SCNC: 11 MMOL/L — SIGNIFICANT CHANGE UP (ref 5–17)
BUN SERPL-MCNC: 49 MG/DL — HIGH (ref 7–23)
BUN SERPL-MCNC: 49 MG/DL — HIGH (ref 7–23)
CALCIUM SERPL-MCNC: 8.6 MG/DL — SIGNIFICANT CHANGE UP (ref 8.5–10.1)
CALCIUM SERPL-MCNC: 8.6 MG/DL — SIGNIFICANT CHANGE UP (ref 8.5–10.1)
CHLORIDE SERPL-SCNC: 99 MMOL/L — SIGNIFICANT CHANGE UP (ref 96–108)
CHLORIDE SERPL-SCNC: 99 MMOL/L — SIGNIFICANT CHANGE UP (ref 96–108)
CO2 SERPL-SCNC: 26 MMOL/L — SIGNIFICANT CHANGE UP (ref 22–31)
CO2 SERPL-SCNC: 26 MMOL/L — SIGNIFICANT CHANGE UP (ref 22–31)
CREAT SERPL-MCNC: 10 MG/DL — HIGH (ref 0.5–1.3)
CREAT SERPL-MCNC: 10 MG/DL — HIGH (ref 0.5–1.3)
EGFR: 6 ML/MIN/1.73M2 — LOW
EGFR: 6 ML/MIN/1.73M2 — LOW
GLUCOSE SERPL-MCNC: 168 MG/DL — HIGH (ref 70–99)
GLUCOSE SERPL-MCNC: 168 MG/DL — HIGH (ref 70–99)
HCT VFR BLD CALC: 27.1 % — LOW (ref 39–50)
HCT VFR BLD CALC: 27.1 % — LOW (ref 39–50)
HGB BLD-MCNC: 8.2 G/DL — LOW (ref 13–17)
HGB BLD-MCNC: 8.2 G/DL — LOW (ref 13–17)
MCHC RBC-ENTMCNC: 29.9 PG — SIGNIFICANT CHANGE UP (ref 27–34)
MCHC RBC-ENTMCNC: 29.9 PG — SIGNIFICANT CHANGE UP (ref 27–34)
MCHC RBC-ENTMCNC: 30.3 GM/DL — LOW (ref 32–36)
MCHC RBC-ENTMCNC: 30.3 GM/DL — LOW (ref 32–36)
MCV RBC AUTO: 98.9 FL — SIGNIFICANT CHANGE UP (ref 80–100)
MCV RBC AUTO: 98.9 FL — SIGNIFICANT CHANGE UP (ref 80–100)
NRBC # BLD: 0 /100 WBCS — SIGNIFICANT CHANGE UP (ref 0–0)
NRBC # BLD: 0 /100 WBCS — SIGNIFICANT CHANGE UP (ref 0–0)
PLATELET # BLD AUTO: 591 K/UL — HIGH (ref 150–400)
PLATELET # BLD AUTO: 591 K/UL — HIGH (ref 150–400)
POTASSIUM SERPL-MCNC: 5 MMOL/L — SIGNIFICANT CHANGE UP (ref 3.5–5.3)
POTASSIUM SERPL-MCNC: 5 MMOL/L — SIGNIFICANT CHANGE UP (ref 3.5–5.3)
POTASSIUM SERPL-SCNC: 5 MMOL/L — SIGNIFICANT CHANGE UP (ref 3.5–5.3)
POTASSIUM SERPL-SCNC: 5 MMOL/L — SIGNIFICANT CHANGE UP (ref 3.5–5.3)
RBC # BLD: 2.74 M/UL — LOW (ref 4.2–5.8)
RBC # BLD: 2.74 M/UL — LOW (ref 4.2–5.8)
RBC # FLD: 15.8 % — HIGH (ref 10.3–14.5)
RBC # FLD: 15.8 % — HIGH (ref 10.3–14.5)
SODIUM SERPL-SCNC: 136 MMOL/L — SIGNIFICANT CHANGE UP (ref 135–145)
SODIUM SERPL-SCNC: 136 MMOL/L — SIGNIFICANT CHANGE UP (ref 135–145)
WBC # BLD: 11.74 K/UL — HIGH (ref 3.8–10.5)
WBC # BLD: 11.74 K/UL — HIGH (ref 3.8–10.5)
WBC # FLD AUTO: 11.74 K/UL — HIGH (ref 3.8–10.5)
WBC # FLD AUTO: 11.74 K/UL — HIGH (ref 3.8–10.5)

## 2023-10-22 PROCEDURE — 99233 SBSQ HOSP IP/OBS HIGH 50: CPT

## 2023-10-22 RX ORDER — HYDROMORPHONE HYDROCHLORIDE 2 MG/ML
4 INJECTION INTRAMUSCULAR; INTRAVENOUS; SUBCUTANEOUS EVERY 6 HOURS
Refills: 0 | Status: DISCONTINUED | OUTPATIENT
Start: 2023-10-22 | End: 2023-10-24

## 2023-10-22 RX ORDER — MIDODRINE HYDROCHLORIDE 2.5 MG/1
10 TABLET ORAL EVERY 8 HOURS
Refills: 0 | Status: DISCONTINUED | OUTPATIENT
Start: 2023-10-22 | End: 2023-10-24

## 2023-10-22 RX ORDER — ACETAMINOPHEN 500 MG
1000 TABLET ORAL EVERY 8 HOURS
Refills: 0 | Status: DISCONTINUED | OUTPATIENT
Start: 2023-10-22 | End: 2023-10-24

## 2023-10-22 RX ORDER — GABAPENTIN 400 MG/1
100 CAPSULE ORAL
Refills: 0 | Status: DISCONTINUED | OUTPATIENT
Start: 2023-10-22 | End: 2023-10-24

## 2023-10-22 RX ORDER — HYDROMORPHONE HYDROCHLORIDE 2 MG/ML
2 INJECTION INTRAMUSCULAR; INTRAVENOUS; SUBCUTANEOUS EVERY 6 HOURS
Refills: 0 | Status: DISCONTINUED | OUTPATIENT
Start: 2023-10-22 | End: 2023-10-22

## 2023-10-22 RX ADMIN — Medication 1: at 17:58

## 2023-10-22 RX ADMIN — HYDROMORPHONE HYDROCHLORIDE 2 MILLIGRAM(S): 2 INJECTION INTRAMUSCULAR; INTRAVENOUS; SUBCUTANEOUS at 05:16

## 2023-10-22 RX ADMIN — HEPARIN SODIUM 5000 UNIT(S): 5000 INJECTION INTRAVENOUS; SUBCUTANEOUS at 17:59

## 2023-10-22 RX ADMIN — Medication 1000 MILLIGRAM(S): at 12:37

## 2023-10-22 RX ADMIN — HYDROMORPHONE HYDROCHLORIDE 2 MILLIGRAM(S): 2 INJECTION INTRAMUSCULAR; INTRAVENOUS; SUBCUTANEOUS at 12:30

## 2023-10-22 RX ADMIN — HYDROMORPHONE HYDROCHLORIDE 4 MILLIGRAM(S): 2 INJECTION INTRAMUSCULAR; INTRAVENOUS; SUBCUTANEOUS at 23:26

## 2023-10-22 RX ADMIN — DORZOLAMIDE HYDROCHLORIDE TIMOLOL MALEATE 1 DROP(S): 20; 5 SOLUTION/ DROPS OPHTHALMIC at 05:14

## 2023-10-22 RX ADMIN — Medication 1: at 08:31

## 2023-10-22 RX ADMIN — Medication 1000 MILLIGRAM(S): at 05:46

## 2023-10-22 RX ADMIN — Medication 200 MILLIGRAM(S): at 06:24

## 2023-10-22 RX ADMIN — HYDROMORPHONE HYDROCHLORIDE 4 MILLIGRAM(S): 2 INJECTION INTRAMUSCULAR; INTRAVENOUS; SUBCUTANEOUS at 23:56

## 2023-10-22 RX ADMIN — Medication 1 TABLET(S): at 17:59

## 2023-10-22 RX ADMIN — Medication 2: at 21:53

## 2023-10-22 RX ADMIN — INSULIN GLARGINE 13 UNIT(S): 100 INJECTION, SOLUTION SUBCUTANEOUS at 21:53

## 2023-10-22 RX ADMIN — HYDROMORPHONE HYDROCHLORIDE 4 MILLIGRAM(S): 2 INJECTION INTRAMUSCULAR; INTRAVENOUS; SUBCUTANEOUS at 19:00

## 2023-10-22 RX ADMIN — Medication 1: at 12:30

## 2023-10-22 RX ADMIN — HEPARIN SODIUM 5000 UNIT(S): 5000 INJECTION INTRAVENOUS; SUBCUTANEOUS at 05:13

## 2023-10-22 RX ADMIN — Medication 1000 MILLIGRAM(S): at 05:15

## 2023-10-22 RX ADMIN — Medication 5 UNIT(S): at 08:32

## 2023-10-22 RX ADMIN — Medication 1 TABLET(S): at 05:13

## 2023-10-22 RX ADMIN — ATORVASTATIN CALCIUM 40 MILLIGRAM(S): 80 TABLET, FILM COATED ORAL at 21:53

## 2023-10-22 RX ADMIN — HYDROMORPHONE HYDROCHLORIDE 4 MILLIGRAM(S): 2 INJECTION INTRAMUSCULAR; INTRAVENOUS; SUBCUTANEOUS at 18:33

## 2023-10-22 RX ADMIN — HYDROMORPHONE HYDROCHLORIDE 0.5 MILLIGRAM(S): 2 INJECTION INTRAMUSCULAR; INTRAVENOUS; SUBCUTANEOUS at 01:47

## 2023-10-22 RX ADMIN — HYDROMORPHONE HYDROCHLORIDE 2 MILLIGRAM(S): 2 INJECTION INTRAMUSCULAR; INTRAVENOUS; SUBCUTANEOUS at 05:46

## 2023-10-22 RX ADMIN — Medication 5 UNIT(S): at 17:59

## 2023-10-22 RX ADMIN — HYDROMORPHONE HYDROCHLORIDE 0.5 MILLIGRAM(S): 2 INJECTION INTRAMUSCULAR; INTRAVENOUS; SUBCUTANEOUS at 02:17

## 2023-10-22 RX ADMIN — MIDODRINE HYDROCHLORIDE 10 MILLIGRAM(S): 2.5 TABLET ORAL at 14:17

## 2023-10-22 RX ADMIN — GABAPENTIN 100 MILLIGRAM(S): 400 CAPSULE ORAL at 05:16

## 2023-10-22 RX ADMIN — HYDROMORPHONE HYDROCHLORIDE 2 MILLIGRAM(S): 2 INJECTION INTRAMUSCULAR; INTRAVENOUS; SUBCUTANEOUS at 12:37

## 2023-10-22 RX ADMIN — DORZOLAMIDE HYDROCHLORIDE TIMOLOL MALEATE 1 DROP(S): 20; 5 SOLUTION/ DROPS OPHTHALMIC at 18:00

## 2023-10-22 RX ADMIN — Medication 1000 MILLIGRAM(S): at 21:53

## 2023-10-22 RX ADMIN — GABAPENTIN 100 MILLIGRAM(S): 400 CAPSULE ORAL at 18:00

## 2023-10-22 RX ADMIN — Medication 5 UNIT(S): at 12:31

## 2023-10-22 NOTE — PROGRESS NOTE ADULT - ASSESSMENT
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 Sin/Dr. Singh) Presents to ED [facility] 10/10 with weakness for 2 weeks. 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 Wound care consult and ID consults are requested .

## 2023-10-22 NOTE — PROGRESS NOTE ADULT - TIME BILLING
Gas gangrene
as above
Gas gangrene
in direct care of patient , reviewing labs and other results and adjusting medications and in discussion with other consultants , RN and PMD
in direct care of patient ,reviewing labs and other results and adjusting medications and in discussion with other consultants , RN and PMD
as above

## 2023-10-22 NOTE — PROGRESS NOTE ADULT - SUBJECTIVE AND OBJECTIVE BOX
Date/Time Patient Seen:  		  Referring MD:   Data Reviewed	       Patient is a 48y old  Male who presents with a chief complaint of weakness (21 Oct 2023 23:51)      Subjective/HPI     PAST MEDICAL & SURGICAL HISTORY:  ESRD on dialysis    H/O hypotension    Diabetes mellitus    Leg wound, left    Steal syndrome dialysis vascular access    Gangrene of finger of right hand    PVD (peripheral vascular disease)    Anemia of chronic disease    Constipation    HLD (hyperlipidemia)    S/P BKA (below knee amputation) bilateral    AV fistula          Medication list         MEDICATIONS  (STANDING):  acetaminophen     Tablet .. 1000 milliGRAM(s) Oral every 8 hours  atorvastatin 40 milliGRAM(s) Oral at bedtime  cefpodoxime 200 milliGRAM(s) Oral every 24 hours  dextrose 50% Injectable 12.5 Gram(s) IV Push once  dextrose 50% Injectable 25 Gram(s) IV Push once  dorzolamide 2%/timolol 0.5% Ophthalmic Solution 1 Drop(s) Both EYES two times a day  epoetin deidre (PROCRIT) Injectable 70601 Unit(s) IV Push <User Schedule>  gabapentin 100 milliGRAM(s) Oral two times a day  glucagon  Injectable 1 milliGRAM(s) IntraMuscular once  heparin   Injectable 5000 Unit(s) SubCutaneous every 12 hours  HYDROmorphone   Tablet 2 milliGRAM(s) Oral every 6 hours  insulin glargine Injectable (LANTUS) 13 Unit(s) SubCutaneous at bedtime  insulin lispro (ADMELOG) corrective regimen sliding scale   SubCutaneous Before meals and at bedtime  insulin lispro Injectable (ADMELOG) 5 Unit(s) SubCutaneous three times a day before meals  lactobacillus acidophilus 1 Tablet(s) Oral every 12 hours    MEDICATIONS  (PRN):  aluminum hydroxide/magnesium hydroxide/simethicone Suspension 30 milliLiter(s) Oral every 4 hours PRN Dyspepsia  HYDROmorphone  Injectable 0.5 milliGRAM(s) IV Push every 4 hours PRN Severe Pain (7 - 10)  melatonin 3 milliGRAM(s) Oral at bedtime PRN Insomnia  midodrine. 10 milliGRAM(s) Oral three times a day PRN hold for sbp > 110         Vitals log        ICU Vital Signs Last 24 Hrs  T(C): 36.8 (22 Oct 2023 04:50), Max: 36.9 (21 Oct 2023 11:54)  T(F): 98.3 (22 Oct 2023 04:50), Max: 98.5 (21 Oct 2023 11:54)  HR: 64 (22 Oct 2023 04:50) (62 - 66)  BP: 130/74 (22 Oct 2023 04:50) (105/65 - 138/71)  BP(mean): --  ABP: --  ABP(mean): --  RR: 18 (22 Oct 2023 04:50) (18 - 18)  SpO2: 98% (22 Oct 2023 04:50) (98% - 99%)    O2 Parameters below as of 22 Oct 2023 04:50  Patient On (Oxygen Delivery Method): room air                 Input and Output:  I&O's Detail    20 Oct 2023 07:01  -  21 Oct 2023 07:00  --------------------------------------------------------  IN:    Other (mL): 800 mL  Total IN: 800 mL    OUT:    Other (mL): 2300 mL  Total OUT: 2300 mL    Total NET: -1500 mL          Lab Data                        8.7    14.30 )-----------( 633      ( 21 Oct 2023 11:19 )             28.1     10-21    137  |  100  |  37<H>  ----------------------------<  164<H>  4.1   |  25  |  7.80<H>    Ca    8.8      21 Oct 2023 11:19              Review of Systems	      Objective     Physical Examination    heart s1s2  lung dc BS  head nc      Pertinent Lab findings & Imaging      Barak:  NO   Adequate UO     I&O's Detail    20 Oct 2023 07:01  -  21 Oct 2023 07:00  --------------------------------------------------------  IN:    Other (mL): 800 mL  Total IN: 800 mL    OUT:    Other (mL): 2300 mL  Total OUT: 2300 mL    Total NET: -1500 mL               Discussed with:     Cultures:	        Radiology

## 2023-10-22 NOTE — PROGRESS NOTE ADULT - SUBJECTIVE AND OBJECTIVE BOX
Patient is a 48y Male whom presented to the hospital with esrd on hd     PAST MEDICAL & SURGICAL HISTORY:  ESRD on dialysis      H/O hypotension      Diabetes mellitus      Leg wound, left      Steal syndrome dialysis vascular access      Gangrene of finger of right hand      PVD (peripheral vascular disease)      Anemia of chronic disease      Constipation      HLD (hyperlipidemia)      S/P BKA (below knee amputation) bilateral      AV fistula          MEDICATIONS  (STANDING):  atorvastatin 40 milliGRAM(s) Oral at bedtime  collagenase Ointment 1 Application(s) Topical daily  dextrose 5%. 1000 milliLiter(s) (50 mL/Hr) IV Continuous <Continuous>  dextrose 5%. 1000 milliLiter(s) (100 mL/Hr) IV Continuous <Continuous>  dextrose 50% Injectable 25 Gram(s) IV Push once  dextrose 50% Injectable 25 Gram(s) IV Push once  dextrose 50% Injectable 12.5 Gram(s) IV Push once  dorzolamide 2%/timolol 0.5% Ophthalmic Solution 1 Drop(s) Both EYES two times a day  glucagon  Injectable 1 milliGRAM(s) IntraMuscular once  heparin   Injectable 5000 Unit(s) SubCutaneous every 12 hours  insulin lispro (ADMELOG) corrective regimen sliding scale   SubCutaneous every 6 hours  lactobacillus acidophilus 1 Tablet(s) Oral every 12 hours  midodrine. 10 milliGRAM(s) Oral three times a day  pantoprazole    Tablet 40 milliGRAM(s) Oral before breakfast  piperacillin/tazobactam IVPB.. 3.375 Gram(s) IV Intermittent every 12 hours  senna 2 Tablet(s) Oral at bedtime      Allergies    No Known Drug Allergies  [place] (Rash)    Intolerances        SOCIAL HISTORY:  Denies ETOh,Smoking,     FAMILY HISTORY:      REVIEW OF SYSTEMS:    CONSTITUTIONAL: No weakness, fevers or chills  EYES/ENT: No visual changes;  no throat pain   NECK: No pain or stiffness  RESPIRATORY: No cough, wheezing, hemoptysis; No shortness of breath  CARDIOVASCULAR: No chest pain or palpitations  GASTROINTESTINAL: No abdominal or epigastric pain. No nausea, vomiting,                                                                                          8.2    11.74 )-----------( 591      ( 22 Oct 2023 06:53 )             27.1       CBC Full  -  ( 22 Oct 2023 06:53 )  WBC Count : 11.74 K/uL  RBC Count : 2.74 M/uL  Hemoglobin : 8.2 g/dL  Hematocrit : 27.1 %  Platelet Count - Automated : 591 K/uL  Mean Cell Volume : 98.9 fl  Mean Cell Hemoglobin : 29.9 pg  Mean Cell Hemoglobin Concentration : 30.3 gm/dL  Auto Neutrophil # : x  Auto Lymphocyte # : x  Auto Monocyte # : x  Auto Eosinophil # : x  Auto Basophil # : x  Auto Neutrophil % : x  Auto Lymphocyte % : x  Auto Monocyte % : x  Auto Eosinophil % : x  Auto Basophil % : x      10-22    136  |  99  |  49<H>  ----------------------------<  168<H>  5.0   |  26  |  10.00<H>    Ca    8.6      22 Oct 2023 06:53        CAPILLARY BLOOD GLUCOSE      POCT Blood Glucose.: 173 mg/dL (22 Oct 2023 12:16)  POCT Blood Glucose.: 190 mg/dL (22 Oct 2023 08:12)  POCT Blood Glucose.: 161 mg/dL (21 Oct 2023 22:33)  POCT Blood Glucose.: 113 mg/dL (21 Oct 2023 17:10)      Vital Signs Last 24 Hrs  T(C): 37.2 (22 Oct 2023 11:09), Max: 37.2 (22 Oct 2023 11:09)  T(F): 99 (22 Oct 2023 11:09), Max: 99 (22 Oct 2023 11:09)  HR: 66 (22 Oct 2023 11:09) (62 - 66)  BP: 85/54 (22 Oct 2023 11:09) (85/54 - 130/74)  BP(mean): --  RR: 18 (22 Oct 2023 11:09) (18 - 18)  SpO2: 93% (22 Oct 2023 11:09) (93% - 99%)    Parameters below as of 22 Oct 2023 11:09  Patient On (Oxygen Delivery Method): room air        Urinalysis Basic - ( 22 Oct 2023 06:53 )    Color: x / Appearance: x / SG: x / pH: x  Gluc: 168 mg/dL / Ketone: x  / Bili: x / Urobili: x   Blood: x / Protein: x / Nitrite: x   Leuk Esterase: x / RBC: x / WBC x   Sq Epi: x / Non Sq Epi: x / Bacteria: x        PHYSICAL EXAM:    Constitutional: NAD  HEENT: conjunctive   clear   Neck:  No JVD  Respiratory: CTAB  Cardiovascular: S1 and S2  Gastrointestinal: BS+, soft, NT/ND  Extremities: No peripheral edema  Neurological: no focal deficits  Psychiatric: Normal mood, normal affect  : No [place]  Skin: No rashes   S/P BKA (below knee amputation) bilateral  AV fistula  LABS:

## 2023-10-22 NOTE — OCCUPATIONAL THERAPY INITIAL EVALUATION ADULT - PERTINENT HX OF CURRENT PROBLEM, REHAB EVAL
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 Sin/Dr. Singh) Presents to ED [facility] 10/10 with weakness for 2 weeks. pt is dialysis patient M/W/F last dialysis was yesterday but pt has been feeling weak for the last 2 weeks, no cough, fever, chills, no vomiting or diarrhea, pt also has had an open wound under his left thigh that has been neglected for the last few weeks, draining pus and with redness Earler this year he was admitted with vascular steal syndrome c/b R middle finger and forearm gas gangrene s/p amputation and multiple debridements (last 3/16/23) and with associated OM , stabilized s/p debrident and on IV antibiotics .Patient was found to have hypotension and lacticemia , s/p 1500 iv fluids in er , no further iv fluids as per Dr Castillo nephrologist.

## 2023-10-22 NOTE — OCCUPATIONAL THERAPY INITIAL EVALUATION ADULT - BED MOBILITY TRAINING, PT EVAL
Pt will perform supine to sitting at EOB with close supervision in order to prepare for seated ADL tasks in 3-5 OT sessions.

## 2023-10-22 NOTE — OCCUPATIONAL THERAPY INITIAL EVALUATION ADULT - SITTING BALANCE: STATIC
Pt with difficulty maintaining sitting balance at EOB, requiring cg to minimal assist while donning R LE prosthesis

## 2023-10-22 NOTE — OCCUPATIONAL THERAPY INITIAL EVALUATION ADULT - NSACTIVITYREC_GEN_A_OT
ADL retraining, UE/LE strengthening, functional mobility, functional transfers, static/dynamic balance

## 2023-10-22 NOTE — OCCUPATIONAL THERAPY INITIAL EVALUATION ADULT - GENERAL OBSERVATIONS, REHAB EVAL
MD orders for OT received, chart reviewed and contents noted. RN consulted prior to session, stated pt OK to participate. Pt received supine in bed, in NAD, on RA, agreeable to OT eval. Pt with old right BKA (pt with prosthesis in room), now with left AKA. Pt with difficulty coming to a full stand at EOB using Shikha Stedy despite moderate assist x 2, 2 trials attempted. Pt left supine in bed, all needs in reach, family present, +bed alarm, RN aware of pt status.

## 2023-10-22 NOTE — PROGRESS NOTE ADULT - SUBJECTIVE AND OBJECTIVE BOX
Physical Medicine and Rehabilitation Subsequent Evaluation    Patient complains of persistent pain. Requesting oxycodone -- discussed patients renal compromise and that he should be on dilaudid and not oxycodone due to metabolism of oxycodone -- patient acknowledged understanding.    ROS:  Constitutional: Denies fevers or chills  MSK/Neuro: Residual limb, phantom pain and phantom sensation is persistent    PAST MEDICAL & SURGICAL HISTORY:  ESRD on dialysis  H/O hypotension  Diabetes mellitus  Leg wound, left  Steal syndrome dialysis vascular access  Gangrene of finger of right hand  PVD (peripheral vascular disease)  Anemia of chronic disease  Constipation  HLD (hyperlipidemia)  S/P BKA (below knee amputation) bilateral  AV fistula    Medications: reviewed    Physical Exam:   Vitals reviewed    Constitutional: Gen: In no acute distress, cooperative with exam and questioning   Neuro: Cranial Nerves II-XII intact, sensation intact to light touch in peripheral upper and lower extremities  MSK: R BKA, L Knee disarticulation, sensation intact bilateral residual limbs, several digits amputated in the RUE.   Psychiatric: Awake alert fully oriented

## 2023-10-22 NOTE — OCCUPATIONAL THERAPY INITIAL EVALUATION ADULT - ADDITIONAL COMMENTS
Pt reports living in a house with his wife and son with 0 GALDINO. Pt reports one step to navigate inside. Pt reports being independent in ADLs with occasional assist, but otherwise independent in functional mobility/transfers with use of bilateral prosthetics. Pt owns a stall shower with a shower chair, and pt also owns a commode. Pt reporting he owns a RW.

## 2023-10-22 NOTE — PROGRESS NOTE ADULT - ASSESSMENT
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 Sin/Dr. Singh) Presents to ED [facility] 10/10 with weakness for 2 weeks. 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 Wound care consult and ID consults are requested . (10 Oct 2023 12:52)      esrd on hd for mwf   Excess fluids and waste products will be removed from your blood; your electrolytes will be balanced; your blood pressure will be controlled.    Admit for septic workup and ID evaluation,send blood and urine cx,serial lactate levels,monitor vitals closley,ivfs hydration,monitor urine output and renal profile,iv abx as per id cons  cefpodoxime 200 milliGRAM(s) Oral every 24 hours    BP monitoring,continue current  meds, low salt diet,followup with PMD in 1-2 weeks  midodrine. 10 milliGRAM(s) Oral three times a day        ANEMIA PLAN:  Anemia of chronic disease:  We will continue epo aiming for a HCT of 32-36 %.   We will monitor Iron stores, B12 and RBC folate .    dvt heparin   Injectable 5000 Unit(s) SubCutaneous every 12 hours  Taken to OR for urgent left knee disarticulation for gas gangrne  POD#4     Initial (On Arrival)

## 2023-10-22 NOTE — PROGRESS NOTE ADULT - SUBJECTIVE AND OBJECTIVE BOX
Interval History:    CENTRAL LINE:   [  ] YES       [  ] NO  FLORES:                 [  ] YES       [  ] NO         REVIEW OF SYSTEMS:  All Systems below were reviewed and are negative [  ]  HEENT:  ID:  Pulmonary:  Cardiac:  GI:  Renal:  Musculoskeletal:  All other systems above were reviewed and are negative   [  ]      MEDICATIONS  (STANDING):  acetaminophen     Tablet .. 1000 milliGRAM(s) Oral every 8 hours  atorvastatin 40 milliGRAM(s) Oral at bedtime  cefpodoxime 200 milliGRAM(s) Oral every 24 hours  dextrose 50% Injectable 12.5 Gram(s) IV Push once  dextrose 50% Injectable 25 Gram(s) IV Push once  dorzolamide 2%/timolol 0.5% Ophthalmic Solution 1 Drop(s) Both EYES two times a day  epoetin deidre (PROCRIT) Injectable 58826 Unit(s) IV Push <User Schedule>  gabapentin 100 milliGRAM(s) Oral two times a day  glucagon  Injectable 1 milliGRAM(s) IntraMuscular once  heparin   Injectable 5000 Unit(s) SubCutaneous every 12 hours  HYDROmorphone   Tablet 4 milliGRAM(s) Oral every 6 hours  insulin glargine Injectable (LANTUS) 13 Unit(s) SubCutaneous at bedtime  insulin lispro (ADMELOG) corrective regimen sliding scale   SubCutaneous Before meals and at bedtime  insulin lispro Injectable (ADMELOG) 5 Unit(s) SubCutaneous three times a day before meals  lactobacillus acidophilus 1 Tablet(s) Oral every 12 hours    MEDICATIONS  (PRN):  aluminum hydroxide/magnesium hydroxide/simethicone Suspension 30 milliLiter(s) Oral every 4 hours PRN Dyspepsia  HYDROmorphone  Injectable 0.5 milliGRAM(s) IV Push every 4 hours PRN Severe Pain (7 - 10)  melatonin 3 milliGRAM(s) Oral at bedtime PRN Insomnia  midodrine. 10 milliGRAM(s) Oral every 8 hours PRN for BP<100      Vital Signs Last 24 Hrs  T(C): 37.2 (22 Oct 2023 11:09), Max: 37.2 (22 Oct 2023 11:09)  T(F): 99 (22 Oct 2023 11:09), Max: 99 (22 Oct 2023 11:09)  HR: 66 (22 Oct 2023 11:09) (62 - 66)  BP: 139/70 (22 Oct 2023 18:06) (85/54 - 139/70)  BP(mean): --  RR: 18 (22 Oct 2023 11:09) (18 - 18)  SpO2: 93% (22 Oct 2023 11:09) (93% - 99%)    Parameters below as of 22 Oct 2023 11:09  Patient On (Oxygen Delivery Method): room air        I&O's Summary      PHYSICAL EXAM:  HEENT: NC/AT; PERRLA  Neck: Soft; no tenderness  Lungs: CTA bilaterally; no wheezing.   Heart:  Abdomen:  Genital/ Rectal:  Extremities:  Neurologic:  Vascular:      LABORATORY:    CBC Full  -  ( 22 Oct 2023 06:53 )  WBC Count : 11.74 K/uL  RBC Count : 2.74 M/uL  Hemoglobin : 8.2 g/dL  Hematocrit : 27.1 %  Platelet Count - Automated : 591 K/uL  Mean Cell Volume : 98.9 fl  Mean Cell Hemoglobin : 29.9 pg  Mean Cell Hemoglobin Concentration : 30.3 gm/dL  Auto Neutrophil # : x  Auto Lymphocyte # : x  Auto Monocyte # : x  Auto Eosinophil # : x  Auto Basophil # : x  Auto Neutrophil % : x  Auto Lymphocyte % : x  Auto Monocyte % : x  Auto Eosinophil % : x  Auto Basophil % : x          10-22    136  |  99  |  49<H>  ----------------------------<  168<H>  5.0   |  26  |  10.00<H>    Ca    8.6      22 Oct 2023 06:53            Assessment and Plan:          Nii Alonzo MD   [phone].  He is afebrile  Comfortable      MEDICATIONS  (STANDING):  acetaminophen     Tablet .. 1000 milliGRAM(s) Oral every 8 hours  atorvastatin 40 milliGRAM(s) Oral at bedtime  cefpodoxime 200 milliGRAM(s) Oral every 24 hours  dextrose 50% Injectable 12.5 Gram(s) IV Push once  dextrose 50% Injectable 25 Gram(s) IV Push once  dorzolamide 2%/timolol 0.5% Ophthalmic Solution 1 Drop(s) Both EYES two times a day  epoetin deidre (PROCRIT) Injectable 44219 Unit(s) IV Push <User Schedule>  gabapentin 100 milliGRAM(s) Oral two times a day  glucagon  Injectable 1 milliGRAM(s) IntraMuscular once  heparin   Injectable 5000 Unit(s) SubCutaneous every 12 hours  HYDROmorphone   Tablet 4 milliGRAM(s) Oral every 6 hours  insulin glargine Injectable (LANTUS) 13 Unit(s) SubCutaneous at bedtime  insulin lispro (ADMELOG) corrective regimen sliding scale   SubCutaneous Before meals and at bedtime  insulin lispro Injectable (ADMELOG) 5 Unit(s) SubCutaneous three times a day before meals  lactobacillus acidophilus 1 Tablet(s) Oral every 12 hours    MEDICATIONS  (PRN):  aluminum hydroxide/magnesium hydroxide/simethicone Suspension 30 milliLiter(s) Oral every 4 hours PRN Dyspepsia  HYDROmorphone  Injectable 0.5 milliGRAM(s) IV Push every 4 hours PRN Severe Pain (7 - 10)  melatonin 3 milliGRAM(s) Oral at bedtime PRN Insomnia  midodrine. 10 milliGRAM(s) Oral every 8 hours PRN for BP<100      Vital Signs Last 24 Hrs  T(C): 37.2 (22 Oct 2023 11:09), Max: 37.2 (22 Oct 2023 11:09)  T(F): 99 (22 Oct 2023 11:09), Max: 99 (22 Oct 2023 11:09)  HR: 66 (22 Oct 2023 11:09) (62 - 66)  BP: 139/70 (22 Oct 2023 18:06) (85/54 - 139/70)  BP(mean): --  RR: 18 (22 Oct 2023 11:09) (18 - 18)  SpO2: 93% (22 Oct 2023 11:09) (93% - 99%)    Parameters below as of 22 Oct 2023 11:09  Patient On (Oxygen Delivery Method): room air        I&O's Summary      PHYSICAL EXAM:  HEENT: NC/AT; PERRLA  Neck: Soft; no tenderness  Lungs: CTA bilaterally; no wheezing.   Heart:  Abdomen:  Genital/ Rectal:  Extremities:  Neurologic:  Vascular:      LABORATORY:    CBC Full  -  ( 22 Oct 2023 06:53 )  WBC Count : 11.74 K/uL  RBC Count : 2.74 M/uL  Hemoglobin : 8.2 g/dL  Hematocrit : 27.1 %  Platelet Count - Automated : 591 K/uL  Mean Cell Volume : 98.9 fl  Mean Cell Hemoglobin : 29.9 pg  Mean Cell Hemoglobin Concentration : 30.3 gm/dL  Auto Neutrophil # : x  Auto Lymphocyte # : x  Auto Monocyte # : x  Auto Eosinophil # : x  Auto Basophil # : x  Auto Neutrophil % : x  Auto Lymphocyte % : x  Auto Monocyte % : x  Auto Eosinophil % : x  Auto Basophil % : x          10-22    136  |  99  |  49<H>  ----------------------------<  168<H>  5.0   |  26  |  10.00<H>    Ca    8.6      22 Oct 2023 06:53    Assessment and Plan:    1. Left posterior thigh infected wound with necrotizing fasciitis, s/p debridement and left AKA.   2. Sepsis, likely due to infected wound.  3. ESRD on HD  4. Bilateral BKA.    . Sepsis has resolved.   . L AKA and wound is healing well. He had wound closure yesterday.   . Wound cultures grew group B Strep and E coli.   . Continue  po Vantin 200 mg daily fora total of  7 days.   . Waiting for closure L AKA stump tomorrow with surgery. Wound care as per surgery.  . The patient to follow with surgery          Nii Alonzo MD   [phone].  He is afebrile  Comfortable      MEDICATIONS  (STANDING):  acetaminophen     Tablet .. 1000 milliGRAM(s) Oral every 8 hours  atorvastatin 40 milliGRAM(s) Oral at bedtime  cefpodoxime 200 milliGRAM(s) Oral every 24 hours  dextrose 50% Injectable 12.5 Gram(s) IV Push once  dextrose 50% Injectable 25 Gram(s) IV Push once  dorzolamide 2%/timolol 0.5% Ophthalmic Solution 1 Drop(s) Both EYES two times a day  epoetin deidre (PROCRIT) Injectable 48541 Unit(s) IV Push <User Schedule>  gabapentin 100 milliGRAM(s) Oral two times a day  glucagon  Injectable 1 milliGRAM(s) IntraMuscular once  heparin   Injectable 5000 Unit(s) SubCutaneous every 12 hours  HYDROmorphone   Tablet 4 milliGRAM(s) Oral every 6 hours  insulin glargine Injectable (LANTUS) 13 Unit(s) SubCutaneous at bedtime  insulin lispro (ADMELOG) corrective regimen sliding scale   SubCutaneous Before meals and at bedtime  insulin lispro Injectable (ADMELOG) 5 Unit(s) SubCutaneous three times a day before meals  lactobacillus acidophilus 1 Tablet(s) Oral every 12 hours    MEDICATIONS  (PRN):  aluminum hydroxide/magnesium hydroxide/simethicone Suspension 30 milliLiter(s) Oral every 4 hours PRN Dyspepsia  HYDROmorphone  Injectable 0.5 milliGRAM(s) IV Push every 4 hours PRN Severe Pain (7 - 10)  melatonin 3 milliGRAM(s) Oral at bedtime PRN Insomnia  midodrine. 10 milliGRAM(s) Oral every 8 hours PRN for BP<100      Vital Signs Last 24 Hrs  T(C): 37.2 (22 Oct 2023 11:09), Max: 37.2 (22 Oct 2023 11:09)  T(F): 99 (22 Oct 2023 11:09), Max: 99 (22 Oct 2023 11:09)  HR: 66 (22 Oct 2023 11:09) (62 - 66)  BP: 139/70 (22 Oct 2023 18:06) (85/54 - 139/70)  BP(mean): --  RR: 18 (22 Oct 2023 11:09) (18 - 18)  SpO2: 93% (22 Oct 2023 11:09) (93% - 99%)    Parameters below as of 22 Oct 2023 11:09  Patient On (Oxygen Delivery Method): room air        PHYSICAL EXAM:  HEENT: NC/AT; PERRLA  Neck: Soft; no tenderness  Lungs: CTA bilaterally; no wheezing.   Heart: RRR, no murmurs.   Abdomen: Soft, no tenderness.   Genital/ Rectal: No lackey catheter.   Extremities: Left AKA stump with clean dressing.   Neurologic: Awake. No focal weakness.      LABORATORY:    CBC Full  -  ( 22 Oct 2023 06:53 )  WBC Count : 11.74 K/uL  RBC Count : 2.74 M/uL  Hemoglobin : 8.2 g/dL  Hematocrit : 27.1 %  Platelet Count - Automated : 591 K/uL  Mean Cell Volume : 98.9 fl  Mean Cell Hemoglobin : 29.9 pg  Mean Cell Hemoglobin Concentration : 30.3 gm/dL  Auto Neutrophil # : x  Auto Lymphocyte # : x  Auto Monocyte # : x  Auto Eosinophil # : x  Auto Basophil # : x  Auto Neutrophil % : x  Auto Lymphocyte % : x  Auto Monocyte % : x  Auto Eosinophil % : x  Auto Basophil % : x          10-22    136  |  99  |  49<H>  ----------------------------<  168<H>  5.0   |  26  |  10.00<H>    Ca    8.6      22 Oct 2023 06:53    Assessment and Plan:    1. Left posterior thigh infected wound with necrotizing fasciitis, s/p debridement and left AKA.   2. Sepsis, likely due to infected wound.  3. ESRD on HD  4. Bilateral BKA.    . Sepsis has resolved.   . L AKA and wound is healing well. He had wound closure done  . Wound cultures grew group B Strep and E coli.   . Continue  po Vantin 200 mg daily fora total of  7 days.   . Waiting for closure L AKA stump tomorrow with surgery. Wound care as per surgery.  . The patient to follow with surgery          Nii Alonzo MD   [phone].

## 2023-10-22 NOTE — SOCIAL WORK PROGRESS NOTE - NSSWPROGRESSNOTE_GEN_ALL_CORE
Physical therapy called and states pt is Max assist of 2 pt refusing bhupendra. Sw will need to follow up in the am with plan with pt.

## 2023-10-22 NOTE — PROGRESS NOTE ADULT - ASSESSMENT
48M with L knee disarticulation, complaining of post op pain, phantom pain, phantom sensation    Istop reviewed Reference #: [id number]  Gabapentin maintain BID  Increased dilaudid to 4mg Q6H   If not adequate, will titrate upwards. Will monitor.  Functionally, he is a candidate for subacute rehabilitation, will need PT and OT to address functional deficits.    35 minutes spent during patient encounter:    ¦ preparing to see the patient   ¦ performing a medically appropriate examination and/or evaluation   ¦ counseling and educating the patient/family/caregiver re dilaudid for renal compromise  ¦ ordering medications, tests, or procedures   ¦ referring and communicating with other health care professionals  ¦ documenting clinical information in the electronic or other health record

## 2023-10-22 NOTE — PROGRESS NOTE ADULT - ASSESSMENT
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 Sin/Dr. Singh) Presents to ED [facility] 10/10 with weakness for 2 weeks    anemia  esrd  weakness  DM  BKA  PAD  PVD  Pain    on ABX  POD 3  PMR cx noted - Pain management  AKA  vs noted  labs reviewed    full code  lives with family at home - in [facility] as per renal  pain relief  oral hygiene  skin care  wound care  assist with needs

## 2023-10-22 NOTE — OCCUPATIONAL THERAPY INITIAL EVALUATION ADULT - LEVEL OF INDEPENDENCE: SIT/STAND, REHAB EVAL
unable to come to full standing with assist of 2 and use of Shikha Stedy/moderate assist (50% patients effort)

## 2023-10-22 NOTE — OCCUPATIONAL THERAPY INITIAL EVALUATION ADULT - RANGE OF MOTION EXAMINATION, UPPER EXTREMITY
Pt with amputation to right digits- pt missing digit III, DIP of digit II right UE/Left UE Active ROM was WNL (within normal limits)/Right UE Active ROM was WNL (within normal limits)

## 2023-10-22 NOTE — PROGRESS NOTE ADULT - SUBJECTIVE AND OBJECTIVE BOX
Patient is a 48y Male with a known history of :  Sepsis [A41.9]    Leg wound, left [S81.802A]    ESRD on hemodialysis [N18.6]    Elevated lactic acid level [R79.89]    Dehydration [E86.0]    Prophylactic measure [Z29.9]    DM type 2, not at goal [E11.9]    DM (diabetes mellitus), type 2 [E11.9]    PVD (peripheral vascular disease) [I73.9]    HLD (hyperlipidemia) [E78.5]    Hypotension [I95.9]    Tachycardia [R00.0]    Unstageable pressure ulcer of stump of below knee amputation [T87.89]    Necrotizing fasciitis of lower leg [M72.6]      HPI:  49 yo malewith PMH of DM2, b/l BKA, ESRD (on HD MWF) ,infection  right third finger 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 Sin/Dr. Singh) Presents to ED [facility] 10/10 with weakness for 2 weeks. 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 Wound care consult and ID consults are requested . (10 Oct 2023 12:52)      REVIEW OF SYSTEMS:    CONSTITUTIONAL: No fever, weight loss, or fatigue  EYES: No eye pain, visual disturbances, or discharge  ENMT:  No difficulty hearing, tinnitus, vertigo; No sinus or throat pain  NECK: No pain or stiffness  BREASTS: No pain, masses, or nipple discharge  RESPIRATORY: No cough, wheezing, chills or hemoptysis; No shortness of breath  CARDIOVASCULAR: No chest pain, palpitations, dizziness, or leg swelling  GASTROINTESTINAL: No abdominal or epigastric pain. No nausea, vomiting, or hematemesis; No diarrhea or constipation. No melena or hematochezia.  GENITOURINARY: No dysuria, frequency, hematuria, or incontinence  NEUROLOGICAL: No headaches, memory loss, loss of strength, numbness, or tremors  SKIN: No itching, burning, rashes, or lesions   LYMPH NODES: No enlarged glands  ENDOCRINE: No heat or cold intolerance; No hair loss  MUSCULOSKELETAL: No joint pain or swelling; No muscle, back, or extremity pain  PSYCHIATRIC: No depression, anxiety, mood swings, or difficulty sleeping  HEME/LYMPH: No easy bruising, or bleeding gums  ALLERGY AND IMMUNOLOGIC: No hives or eczema    MEDICATIONS  (STANDING):  acetaminophen     Tablet .. 1000 milliGRAM(s) Oral every 8 hours  atorvastatin 40 milliGRAM(s) Oral at bedtime  cefpodoxime 200 milliGRAM(s) Oral every 24 hours  dextrose 50% Injectable 12.5 Gram(s) IV Push once  dextrose 50% Injectable 25 Gram(s) IV Push once  dorzolamide 2%/timolol 0.5% Ophthalmic Solution 1 Drop(s) Both EYES two times a day  epoetin deidre (PROCRIT) Injectable 20392 Unit(s) IV Push <User Schedule>  gabapentin 100 milliGRAM(s) Oral two times a day  glucagon  Injectable 1 milliGRAM(s) IntraMuscular once  heparin   Injectable 5000 Unit(s) SubCutaneous every 12 hours  HYDROmorphone   Tablet 2 milliGRAM(s) Oral every 6 hours  insulin glargine Injectable (LANTUS) 13 Unit(s) SubCutaneous at bedtime  insulin lispro (ADMELOG) corrective regimen sliding scale   SubCutaneous Before meals and at bedtime  insulin lispro Injectable (ADMELOG) 5 Unit(s) SubCutaneous three times a day before meals  lactobacillus acidophilus 1 Tablet(s) Oral every 12 hours    MEDICATIONS  (PRN):  aluminum hydroxide/magnesium hydroxide/simethicone Suspension 30 milliLiter(s) Oral every 4 hours PRN Dyspepsia  HYDROmorphone  Injectable 0.5 milliGRAM(s) IV Push every 4 hours PRN Severe Pain (7 - 10)  melatonin 3 milliGRAM(s) Oral at bedtime PRN Insomnia  midodrine. 10 milliGRAM(s) Oral three times a day PRN hold for sbp > 110      ALLERGIES: No Known Drug Allergies  [place] (Rash)      FAMILY HISTORY:      PHYSICAL EXAMINATION:  -----------------------------  T(C): 36.8 (10-22-23 @ 04:50), Max: 36.9 (10-21-23 @ 11:54)  HR: 64 (10-22-23 @ 04:50) (62 - 66)  BP: 130/74 (10-22-23 @ 04:50) (105/65 - 138/71)  RR: 18 (10-22-23 @ 04:50) (18 - 18)  SpO2: 98% (10-22-23 @ 04:50) (98% - 99%)  Wt(kg): --        VITALS  T(C): 36.8 (10-22-23 @ 04:50), Max: 36.9 (10-21-23 @ 11:54)  HR: 64 (10-22-23 @ 04:50) (62 - 66)  BP: 130/74 (10-22-23 @ 04:50) (105/65 - 138/71)  RR: 18 (10-22-23 @ 04:50) (18 - 18)  SpO2: 98% (10-22-23 @ 04:50) (98% - 99%)    Constitutional: well developed, normal appearance, well groomed, well nourished, no deformities and no acute distress.   Eyes: the conjunctiva exhibited no abnormalities and the eyelids demonstrated no xanthelasmas.   HEENT: normal oral mucosa, no oral pallor and no oral cyanosis.   Neck: normal jugular venous A waves present, normal jugular venous V waves present and no jugular venous dolan A waves.   Pulmonary: no respiratory distress, normal respiratory rhythm and effort, no accessory muscle use and lungs were clear to auscultation bilaterally.   Cardiovascular: heart rate and rhythm were normal, normal S1 and S2 and no murmur, gallop, rub, heave or thrill are present.   Abdomen: soft, non-tender, no hepato-splenomegaly and no abdominal mass palpated.   Musculoskeletal: the gait could not be assessed..   Extremities: no clubbing of the fingernails, no localized cyanosis, no petechial hemorrhages and no ischemic changes.   Skin: normal skin color and pigmentation, no rash, no venous stasis, no skin lesions, no skin ulcer and no xanthoma was observed.   Psychiatric: oriented to person, place, and time, the affect was normal, the mood was normal and not feeling anxious.     LABS:   --------  10-22    136  |  99  |  49<H>  ----------------------------<  168<H>  5.0   |  26  |  10.00<H>    Ca    8.6      22 Oct 2023 06:53                           8.2    11.74 )-----------( 591      ( 22 Oct 2023 06:53 )             27.1                 RADIOLOGY:  -----------------    ECG:     ECHO:

## 2023-10-22 NOTE — OCCUPATIONAL THERAPY INITIAL EVALUATION ADULT - ADL RETRAINING, OT EVAL
Pt will perform LE dressing seated at EOB using AE as needed with minimal assist in 3-5 OT sessions.

## 2023-10-22 NOTE — PROGRESS NOTE ADULT - SUBJECTIVE AND OBJECTIVE BOX
VASCULAR SURGERY PA NOTE ON BEHALF OF DR. Aguilar:    S: Patient seen and examined at bedside.   No acute events overnight.  Patient endorses pain is controlled to LLE.  Denies fevers, chills, chest pain, SOB, palpitations, calf pain.      MEDICATIONS:  acetaminophen     Tablet .. 1000 milliGRAM(s) Oral every 8 hours  aluminum hydroxide/magnesium hydroxide/simethicone Suspension 30 milliLiter(s) Oral every 4 hours PRN  atorvastatin 40 milliGRAM(s) Oral at bedtime  cefpodoxime 200 milliGRAM(s) Oral every 24 hours  dextrose 50% Injectable 12.5 Gram(s) IV Push once  dextrose 50% Injectable 25 Gram(s) IV Push once  dorzolamide 2%/timolol 0.5% Ophthalmic Solution 1 Drop(s) Both EYES two times a day  epoetin deidre (PROCRIT) Injectable 82918 Unit(s) IV Push <User Schedule>  gabapentin 100 milliGRAM(s) Oral two times a day  glucagon  Injectable 1 milliGRAM(s) IntraMuscular once  heparin   Injectable 5000 Unit(s) SubCutaneous every 12 hours  HYDROmorphone   Tablet 2 milliGRAM(s) Oral every 6 hours  HYDROmorphone  Injectable 0.5 milliGRAM(s) IV Push every 4 hours PRN  insulin glargine Injectable (LANTUS) 13 Unit(s) SubCutaneous at bedtime  insulin lispro (ADMELOG) corrective regimen sliding scale   SubCutaneous Before meals and at bedtime  insulin lispro Injectable (ADMELOG) 5 Unit(s) SubCutaneous three times a day before meals  lactobacillus acidophilus 1 Tablet(s) Oral every 12 hours  melatonin 3 milliGRAM(s) Oral at bedtime PRN  midodrine. 10 milliGRAM(s) Oral three times a day PRN      O:  Vital Signs Last 24 Hrs  T(C): 36.8 (22 Oct 2023 04:50), Max: 36.9 (21 Oct 2023 11:54)  T(F): 98.3 (22 Oct 2023 04:50), Max: 98.5 (21 Oct 2023 11:54)  HR: 64 (22 Oct 2023 04:50) (62 - 66)  BP: 130/74 (22 Oct 2023 04:50) (105/65 - 138/71)  BP(mean): --  RR: 18 (22 Oct 2023 04:50) (18 - 18)  SpO2: 98% (22 Oct 2023 04:50) (98% - 99%)    Parameters below as of 22 Oct 2023 04:50  Patient On (Oxygen Delivery Method): room air        I&O SUMMARY:      PHYSICAL EXAM:  Lungs: CTA bilat without W/R/R  Card: S1S2  Abd: Soft, NT, ND.  +BS x 4.  No rebound/guarding.    Ext: bilateral BKA.  LLE amputation; incision w/ staples c/D/I.      LABS:                        8.7    14.30 )-----------( 633      ( 21 Oct 2023 11:19 )             28.1     10-21    137  |  100  |  37<H>  ----------------------------<  164<H>  4.1   |  25  |  7.80<H>    Ca    8.8      21 Oct 2023 11:19                  Assessment:  49 yo male with multiple comorbidities and bilateral BKA was consulted for necrotizing fascitis.      Taken to OR for urgent left knee disarticulation for gas gangrne POD #10  Now S/P L AKA POD#3    Plan:  Pain controlled with current regimen  DM management  DVT ppx  PT/OT  Daily dressing changes with DSD  HD as per renal  Vascular surgery following.

## 2023-10-23 LAB
ALBUMIN SERPL ELPH-MCNC: 2 G/DL — LOW (ref 3.3–5)
ALBUMIN SERPL ELPH-MCNC: 2 G/DL — LOW (ref 3.3–5)
ALP SERPL-CCNC: 157 U/L — HIGH (ref 40–120)
ALP SERPL-CCNC: 157 U/L — HIGH (ref 40–120)
ALT FLD-CCNC: 25 U/L — SIGNIFICANT CHANGE UP (ref 12–78)
ALT FLD-CCNC: 25 U/L — SIGNIFICANT CHANGE UP (ref 12–78)
ANION GAP SERPL CALC-SCNC: 15 MMOL/L — SIGNIFICANT CHANGE UP (ref 5–17)
ANION GAP SERPL CALC-SCNC: 15 MMOL/L — SIGNIFICANT CHANGE UP (ref 5–17)
AST SERPL-CCNC: 24 U/L — SIGNIFICANT CHANGE UP (ref 15–37)
AST SERPL-CCNC: 24 U/L — SIGNIFICANT CHANGE UP (ref 15–37)
BILIRUB SERPL-MCNC: 0.3 MG/DL — SIGNIFICANT CHANGE UP (ref 0.2–1.2)
BILIRUB SERPL-MCNC: 0.3 MG/DL — SIGNIFICANT CHANGE UP (ref 0.2–1.2)
BUN SERPL-MCNC: 71 MG/DL — HIGH (ref 7–23)
BUN SERPL-MCNC: 71 MG/DL — HIGH (ref 7–23)
CALCIUM SERPL-MCNC: 9.3 MG/DL — SIGNIFICANT CHANGE UP (ref 8.5–10.1)
CALCIUM SERPL-MCNC: 9.3 MG/DL — SIGNIFICANT CHANGE UP (ref 8.5–10.1)
CHLORIDE SERPL-SCNC: 99 MMOL/L — SIGNIFICANT CHANGE UP (ref 96–108)
CHLORIDE SERPL-SCNC: 99 MMOL/L — SIGNIFICANT CHANGE UP (ref 96–108)
CO2 SERPL-SCNC: 21 MMOL/L — LOW (ref 22–31)
CO2 SERPL-SCNC: 21 MMOL/L — LOW (ref 22–31)
CREAT SERPL-MCNC: 12 MG/DL — HIGH (ref 0.5–1.3)
CREAT SERPL-MCNC: 12 MG/DL — HIGH (ref 0.5–1.3)
EGFR: 5 ML/MIN/1.73M2 — LOW
EGFR: 5 ML/MIN/1.73M2 — LOW
GLUCOSE SERPL-MCNC: 116 MG/DL — HIGH (ref 70–99)
GLUCOSE SERPL-MCNC: 116 MG/DL — HIGH (ref 70–99)
HCT VFR BLD CALC: 28.9 % — LOW (ref 39–50)
HCT VFR BLD CALC: 28.9 % — LOW (ref 39–50)
HGB BLD-MCNC: 9 G/DL — LOW (ref 13–17)
HGB BLD-MCNC: 9 G/DL — LOW (ref 13–17)
MCHC RBC-ENTMCNC: 30 PG — SIGNIFICANT CHANGE UP (ref 27–34)
MCHC RBC-ENTMCNC: 30 PG — SIGNIFICANT CHANGE UP (ref 27–34)
MCHC RBC-ENTMCNC: 31.1 GM/DL — LOW (ref 32–36)
MCHC RBC-ENTMCNC: 31.1 GM/DL — LOW (ref 32–36)
MCV RBC AUTO: 96.3 FL — SIGNIFICANT CHANGE UP (ref 80–100)
MCV RBC AUTO: 96.3 FL — SIGNIFICANT CHANGE UP (ref 80–100)
NRBC # BLD: 0 /100 WBCS — SIGNIFICANT CHANGE UP (ref 0–0)
NRBC # BLD: 0 /100 WBCS — SIGNIFICANT CHANGE UP (ref 0–0)
PLATELET # BLD AUTO: 644 K/UL — HIGH (ref 150–400)
PLATELET # BLD AUTO: 644 K/UL — HIGH (ref 150–400)
POTASSIUM SERPL-MCNC: 5.4 MMOL/L — HIGH (ref 3.5–5.3)
POTASSIUM SERPL-MCNC: 5.4 MMOL/L — HIGH (ref 3.5–5.3)
POTASSIUM SERPL-SCNC: 5.4 MMOL/L — HIGH (ref 3.5–5.3)
POTASSIUM SERPL-SCNC: 5.4 MMOL/L — HIGH (ref 3.5–5.3)
PROT SERPL-MCNC: 7.7 G/DL — SIGNIFICANT CHANGE UP (ref 6–8.3)
PROT SERPL-MCNC: 7.7 G/DL — SIGNIFICANT CHANGE UP (ref 6–8.3)
RBC # BLD: 3 M/UL — LOW (ref 4.2–5.8)
RBC # BLD: 3 M/UL — LOW (ref 4.2–5.8)
RBC # FLD: 15.4 % — HIGH (ref 10.3–14.5)
RBC # FLD: 15.4 % — HIGH (ref 10.3–14.5)
SODIUM SERPL-SCNC: 135 MMOL/L — SIGNIFICANT CHANGE UP (ref 135–145)
SODIUM SERPL-SCNC: 135 MMOL/L — SIGNIFICANT CHANGE UP (ref 135–145)
WBC # BLD: 9.12 K/UL — SIGNIFICANT CHANGE UP (ref 3.8–10.5)
WBC # BLD: 9.12 K/UL — SIGNIFICANT CHANGE UP (ref 3.8–10.5)
WBC # FLD AUTO: 9.12 K/UL — SIGNIFICANT CHANGE UP (ref 3.8–10.5)
WBC # FLD AUTO: 9.12 K/UL — SIGNIFICANT CHANGE UP (ref 3.8–10.5)

## 2023-10-23 RX ORDER — MIDODRINE HYDROCHLORIDE 2.5 MG/1
10 TABLET ORAL ONCE
Refills: 0 | Status: COMPLETED | OUTPATIENT
Start: 2023-10-23 | End: 2023-10-23

## 2023-10-23 RX ADMIN — Medication 1000 MILLIGRAM(S): at 15:01

## 2023-10-23 RX ADMIN — ATORVASTATIN CALCIUM 40 MILLIGRAM(S): 80 TABLET, FILM COATED ORAL at 21:29

## 2023-10-23 RX ADMIN — HYDROMORPHONE HYDROCHLORIDE 0.5 MILLIGRAM(S): 2 INJECTION INTRAMUSCULAR; INTRAVENOUS; SUBCUTANEOUS at 03:02

## 2023-10-23 RX ADMIN — Medication 1 TABLET(S): at 17:54

## 2023-10-23 RX ADMIN — HYDROMORPHONE HYDROCHLORIDE 4 MILLIGRAM(S): 2 INJECTION INTRAMUSCULAR; INTRAVENOUS; SUBCUTANEOUS at 17:53

## 2023-10-23 RX ADMIN — Medication 1000 MILLIGRAM(S): at 21:29

## 2023-10-23 RX ADMIN — HYDROMORPHONE HYDROCHLORIDE 4 MILLIGRAM(S): 2 INJECTION INTRAMUSCULAR; INTRAVENOUS; SUBCUTANEOUS at 23:13

## 2023-10-23 RX ADMIN — HYDROMORPHONE HYDROCHLORIDE 4 MILLIGRAM(S): 2 INJECTION INTRAMUSCULAR; INTRAVENOUS; SUBCUTANEOUS at 15:01

## 2023-10-23 RX ADMIN — Medication 1000 MILLIGRAM(S): at 14:31

## 2023-10-23 RX ADMIN — HYDROMORPHONE HYDROCHLORIDE 4 MILLIGRAM(S): 2 INJECTION INTRAMUSCULAR; INTRAVENOUS; SUBCUTANEOUS at 14:31

## 2023-10-23 RX ADMIN — MIDODRINE HYDROCHLORIDE 10 MILLIGRAM(S): 2.5 TABLET ORAL at 12:24

## 2023-10-23 RX ADMIN — GABAPENTIN 100 MILLIGRAM(S): 400 CAPSULE ORAL at 17:53

## 2023-10-23 RX ADMIN — INSULIN GLARGINE 13 UNIT(S): 100 INJECTION, SOLUTION SUBCUTANEOUS at 21:30

## 2023-10-23 RX ADMIN — MIDODRINE HYDROCHLORIDE 10 MILLIGRAM(S): 2.5 TABLET ORAL at 10:32

## 2023-10-23 RX ADMIN — Medication 5 UNIT(S): at 13:39

## 2023-10-23 RX ADMIN — Medication 5 UNIT(S): at 17:52

## 2023-10-23 RX ADMIN — Medication 1000 MILLIGRAM(S): at 06:11

## 2023-10-23 RX ADMIN — HYDROMORPHONE HYDROCHLORIDE 4 MILLIGRAM(S): 2 INJECTION INTRAMUSCULAR; INTRAVENOUS; SUBCUTANEOUS at 05:41

## 2023-10-23 RX ADMIN — ERYTHROPOIETIN 10000 UNIT(S): 10000 INJECTION, SOLUTION INTRAVENOUS; SUBCUTANEOUS at 11:11

## 2023-10-23 RX ADMIN — Medication 5 UNIT(S): at 08:32

## 2023-10-23 RX ADMIN — HYDROMORPHONE HYDROCHLORIDE 4 MILLIGRAM(S): 2 INJECTION INTRAMUSCULAR; INTRAVENOUS; SUBCUTANEOUS at 06:11

## 2023-10-23 RX ADMIN — Medication 200 MILLIGRAM(S): at 05:42

## 2023-10-23 RX ADMIN — HEPARIN SODIUM 5000 UNIT(S): 5000 INJECTION INTRAVENOUS; SUBCUTANEOUS at 05:42

## 2023-10-23 RX ADMIN — Medication 1000 MILLIGRAM(S): at 05:41

## 2023-10-23 RX ADMIN — HYDROMORPHONE HYDROCHLORIDE 4 MILLIGRAM(S): 2 INJECTION INTRAMUSCULAR; INTRAVENOUS; SUBCUTANEOUS at 18:23

## 2023-10-23 RX ADMIN — Medication 1000 MILLIGRAM(S): at 21:59

## 2023-10-23 RX ADMIN — HYDROMORPHONE HYDROCHLORIDE 0.5 MILLIGRAM(S): 2 INJECTION INTRAMUSCULAR; INTRAVENOUS; SUBCUTANEOUS at 02:32

## 2023-10-23 RX ADMIN — Medication 1 TABLET(S): at 05:41

## 2023-10-23 RX ADMIN — DORZOLAMIDE HYDROCHLORIDE TIMOLOL MALEATE 1 DROP(S): 20; 5 SOLUTION/ DROPS OPHTHALMIC at 17:54

## 2023-10-23 RX ADMIN — GABAPENTIN 100 MILLIGRAM(S): 400 CAPSULE ORAL at 05:41

## 2023-10-23 RX ADMIN — DORZOLAMIDE HYDROCHLORIDE TIMOLOL MALEATE 1 DROP(S): 20; 5 SOLUTION/ DROPS OPHTHALMIC at 05:42

## 2023-10-23 RX ADMIN — HEPARIN SODIUM 5000 UNIT(S): 5000 INJECTION INTRAVENOUS; SUBCUTANEOUS at 17:52

## 2023-10-23 NOTE — CHART NOTE - NSCHARTNOTESELECT_GEN_ALL_CORE
Event Note
Event Note
Nutrition Services
Nutrition Services
POCUS/Event Note
Point of Care Ultrasound Note
Postop check/Event Note
Postop Check/Event Note
Vascular Surgery/Event Note

## 2023-10-23 NOTE — SOCIAL WORK PROGRESS NOTE - NSSWPROGRESSNOTE_GEN_ALL_CORE
DARRIAN spoke with pt at bedside discussed DC planning- pt continues to refuse ALDAIR is very adamant about returning back home, SW asked if he can have his son call SW to discuss ALDAIR/dc planning, number left at bedside. SW to follow.

## 2023-10-23 NOTE — PROGRESS NOTE ADULT - SUBJECTIVE AND OBJECTIVE BOX
PROGRESS NOTE  Patient is a 48y old  Male who presents with a chief complaint of weakness (23 Oct 2023 05:20)    Chart and available morning labs /imaging are reviewed electronically , urgent issues addressed . 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 Sin/Dr. Singh) Presents to ED [facility] 10/10 with weakness for 2 weeks. 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 Wound care consult and ID consults are requested . (10 Oct 2023 12:52)    PAST MEDICAL & SURGICAL HISTORY:  ESRD on dialysis      H/O hypotension      Diabetes mellitus      Leg wound, left      Steal syndrome dialysis vascular access      Gangrene of finger of right hand      PVD (peripheral vascular disease)      Anemia of chronic disease      Constipation      HLD (hyperlipidemia)      S/P BKA (below knee amputation) bilateral      AV fistula          MEDICATIONS  (STANDING):  acetaminophen     Tablet .. 1000 milliGRAM(s) Oral every 8 hours  atorvastatin 40 milliGRAM(s) Oral at bedtime  cefpodoxime 200 milliGRAM(s) Oral every 24 hours  dextrose 50% Injectable 25 Gram(s) IV Push once  dextrose 50% Injectable 12.5 Gram(s) IV Push once  dorzolamide 2%/timolol 0.5% Ophthalmic Solution 1 Drop(s) Both EYES two times a day  epoetin deidre (PROCRIT) Injectable 19396 Unit(s) IV Push <User Schedule>  gabapentin 100 milliGRAM(s) Oral two times a day  glucagon  Injectable 1 milliGRAM(s) IntraMuscular once  heparin   Injectable 5000 Unit(s) SubCutaneous every 12 hours  HYDROmorphone   Tablet 4 milliGRAM(s) Oral every 6 hours  insulin glargine Injectable (LANTUS) 13 Unit(s) SubCutaneous at bedtime  insulin lispro (ADMELOG) corrective regimen sliding scale   SubCutaneous Before meals and at bedtime  insulin lispro Injectable (ADMELOG) 5 Unit(s) SubCutaneous three times a day before meals  lactobacillus acidophilus 1 Tablet(s) Oral every 12 hours    MEDICATIONS  (PRN):  aluminum hydroxide/magnesium hydroxide/simethicone Suspension 30 milliLiter(s) Oral every 4 hours PRN Dyspepsia  HYDROmorphone  Injectable 0.5 milliGRAM(s) IV Push every 4 hours PRN Severe Pain (7 - 10)  melatonin 3 milliGRAM(s) Oral at bedtime PRN Insomnia  midodrine. 10 milliGRAM(s) Oral every 8 hours PRN for BP<100      OBJECTIVE    T(C): 36.8 (10-23-23 @ 09:45), Max: 36.8 (10-23-23 @ 09:45)  HR: 64 (10-23-23 @ 10:20) (60 - 82)  BP: 78/47 (10-23-23 @ 10:20) (78/47 - 139/70)  RR: 18 (10-23-23 @ 09:45) (17 - 18)  SpO2: 100% (10-23-23 @ 09:45) (95% - 100%)  Wt(kg): --  I&O's Summary        REVIEW OF SYSTEMS:  CONSTITUTIONAL: No fever, weight loss, or fatigue  EYES: No eye pain, visual disturbances, or discharge  ENMT:   No sinus or throat pain  NECK: No pain or stiffness  RESPIRATORY: No cough, wheezing, chills or hemoptysis; No shortness of breath  CARDIOVASCULAR: No chest pain, palpitations, dizziness, or leg swelling  GASTROINTESTINAL: No abdominal pain. No nausea, vomiting; No diarrhea or constipation. No melena or hematochezia.  GENITOURINARY: No dysuria, frequency, hematuria, or incontinence  NEUROLOGICAL: No headaches, memory loss, loss of strength, numbness, or tremors  SKIN: No itching, burning, rashes, or lesions   MUSCULOSKELETAL: No joint pain or swelling; No muscle, back, or extremity pain    PHYSICAL EXAM:  Appearance: NAD. VS past 24 hrs -as above   HEENT:   Moist oral mucosa. Conjunctiva clear b/l.   Neck : supple  Respiratory: Lungs CTAB.  Gastrointestinal:  Soft, nontender. No rebound. No rigidity. BS present	  Cardiovascular: RRR ,S1S2 present  Neurologic: Non-focal. Moving all extremities.  Extremities: No edema. No erythema. No calf tenderness.  Skin: No rashes, No ecchymoses, No cyanosis.	  wounds ,skin lesions-See skin assesment flow sheet   LABS:                        9.0    9.12  )-----------( 644      ( 23 Oct 2023 09:28 )             28.9     10-22    136  |  99  |  49<H>  ----------------------------<  168<H>  5.0   |  26  |  10.00<H>    Ca    8.6      22 Oct 2023 06:53      CAPILLARY BLOOD GLUCOSE      POCT Blood Glucose.: 124 mg/dL (23 Oct 2023 08:03)  POCT Blood Glucose.: 218 mg/dL (22 Oct 2023 21:13)  POCT Blood Glucose.: 174 mg/dL (22 Oct 2023 17:14)  POCT Blood Glucose.: 173 mg/dL (22 Oct 2023 12:16)      Urinalysis Basic - ( 22 Oct 2023 06:53 )    Color: x / Appearance: x / SG: x / pH: x  Gluc: 168 mg/dL / Ketone: x  / Bili: x / Urobili: x   Blood: x / Protein: x / Nitrite: x   Leuk Esterase: x / RBC: x / WBC x   Sq Epi: x / Non Sq Epi: x / Bacteria: x        RADIOLOGY & ADDITIONAL TESTS:   reviewed elctronically  ASSESSMENT/PLAN: 	     PROGRESS NOTE  Patient is a 48y old  Male who presents with a chief complaint of weakness (23 Oct 2023 05:20)    Chart and available morning labs /imaging are reviewed electronically , urgent issues addressed . More information  is being added upon completion of rounds , when more information is collected and management discussed with consultants , medical staff and social service/case management on the floor   OVERNIGHT  Seen in HD unit  No new issues reported by medical staff . 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 Sin/Dr. Singh) Presents to ED [facility] 10/10 with weakness for 2 weeks. 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 Wound care consult and ID consults are requested . (10 Oct 2023 12:52)    PAST MEDICAL & SURGICAL HISTORY:  ESRD on dialysis      H/O hypotension      Diabetes mellitus      Leg wound, left      Steal syndrome dialysis vascular access      Gangrene of finger of right hand      PVD (peripheral vascular disease)      Anemia of chronic disease      Constipation      HLD (hyperlipidemia)      S/P BKA (below knee amputation) bilateral      AV fistula          MEDICATIONS  (STANDING):  acetaminophen     Tablet .. 1000 milliGRAM(s) Oral every 8 hours  atorvastatin 40 milliGRAM(s) Oral at bedtime  cefpodoxime 200 milliGRAM(s) Oral every 24 hours  dextrose 50% Injectable 25 Gram(s) IV Push once  dextrose 50% Injectable 12.5 Gram(s) IV Push once  dorzolamide 2%/timolol 0.5% Ophthalmic Solution 1 Drop(s) Both EYES two times a day  epoetin deidre (PROCRIT) Injectable 37193 Unit(s) IV Push <User Schedule>  gabapentin 100 milliGRAM(s) Oral two times a day  glucagon  Injectable 1 milliGRAM(s) IntraMuscular once  heparin   Injectable 5000 Unit(s) SubCutaneous every 12 hours  HYDROmorphone   Tablet 4 milliGRAM(s) Oral every 6 hours  insulin glargine Injectable (LANTUS) 13 Unit(s) SubCutaneous at bedtime  insulin lispro (ADMELOG) corrective regimen sliding scale   SubCutaneous Before meals and at bedtime  insulin lispro Injectable (ADMELOG) 5 Unit(s) SubCutaneous three times a day before meals  lactobacillus acidophilus 1 Tablet(s) Oral every 12 hours    MEDICATIONS  (PRN):  aluminum hydroxide/magnesium hydroxide/simethicone Suspension 30 milliLiter(s) Oral every 4 hours PRN Dyspepsia  HYDROmorphone  Injectable 0.5 milliGRAM(s) IV Push every 4 hours PRN Severe Pain (7 - 10)  melatonin 3 milliGRAM(s) Oral at bedtime PRN Insomnia  midodrine. 10 milliGRAM(s) Oral every 8 hours PRN for BP<100      OBJECTIVE    T(C): 36.8 (10-23-23 @ 09:45), Max: 36.8 (10-23-23 @ 09:45)  HR: 64 (10-23-23 @ 10:20) (60 - 82)  BP: 78/47 (10-23-23 @ 10:20) (78/47 - 139/70)  RR: 18 (10-23-23 @ 09:45) (17 - 18)  SpO2: 100% (10-23-23 @ 09:45) (95% - 100%)  Wt(kg): --  I&O's Summary        REVIEW OF SYSTEMS:  CONSTITUTIONAL: No fever, weight loss, or fatigue  EYES: No eye pain, visual disturbances, or discharge  ENMT:   No sinus or throat pain  NECK: No pain or stiffness  RESPIRATORY: No cough, wheezing, chills or hemoptysis; No shortness of breath  CARDIOVASCULAR: No chest pain, palpitations, dizziness, or leg swelling  GASTROINTESTINAL: No abdominal pain. No nausea, vomiting; No diarrhea or constipation. No melena or hematochezia.  GENITOURINARY: No dysuria, frequency, hematuria, or incontinence  NEUROLOGICAL: No headaches, memory loss, loss of strength, numbness, or tremors  SKIN: No itching, burning, rashes, or lesions   MUSCULOSKELETAL: No joint pain or swelling; No muscle, back, or extremity pain    PHYSICAL EXAM:  Appearance: NAD. VS past 24 hrs -as above   HEENT:   Moist oral mucosa. Conjunctiva clear b/l.   Neck : supple  Respiratory: Lungs CTAB.  Gastrointestinal:  Soft, nontender. No rebound. No rigidity. BS present	  Cardiovascular: RRR ,S1S2 present  Neurologic: Non-focal. Moving all extremities.  Extremities: No edema. No erythema. No calf tenderness.  Skin: No rashes, No ecchymoses, No cyanosis.	  wounds ,skin lesions-See skin assesment flow sheet   LABS:                        9.0    9.12  )-----------( 644      ( 23 Oct 2023 09:28 )             28.9     10-22    136  |  99  |  49<H>  ----------------------------<  168<H>  5.0   |  26  |  10.00<H>    Ca    8.6      22 Oct 2023 06:53      CAPILLARY BLOOD GLUCOSE      POCT Blood Glucose.: 124 mg/dL (23 Oct 2023 08:03)  POCT Blood Glucose.: 218 mg/dL (22 Oct 2023 21:13)  POCT Blood Glucose.: 174 mg/dL (22 Oct 2023 17:14)  POCT Blood Glucose.: 173 mg/dL (22 Oct 2023 12:16)      Urinalysis Basic - ( 22 Oct 2023 06:53 )    Color: x / Appearance: x / SG: x / pH: x  Gluc: 168 mg/dL / Ketone: x  / Bili: x / Urobili: x   Blood: x / Protein: x / Nitrite: x   Leuk Esterase: x / RBC: x / WBC x   Sq Epi: x / Non Sq Epi: x / Bacteria: x        RADIOLOGY & ADDITIONAL TESTS:   reviewed elctronically  ASSESSMENT/PLAN: 	  25 minutes aggregate time was spent on this visit, 50% visit time spent in care co-ordination with other attendings and counselling patient .I have discussed care plan with patient / HCP/family member ,who expressed understanding of problems treatment and their effect and side effects, alternatives in details. I have asked if they have any questions and concerns and appropriately addressed them to best of my ability.

## 2023-10-23 NOTE — CASE MANAGEMENT PROGRESS NOTE - NSCMPROGRESSNOTE_GEN_ALL_CORE
Discussed on rounds this am.  CM consult noted for anticipated d/c home.  Pt for hemodialysis today, awaiting PT recommendation for transitional planning in the post acute setting.  Pt on PO abx and PO pain meds, but still requiring IV pain meds for breakthrough pain.  A CM will remain available through hospitalization.

## 2023-10-23 NOTE — CHART NOTE - NSCHARTNOTEFT_GEN_A_CORE
Assessment: patient seen for follow up. chart reviewed    48y old  Male who presents with a chief complaint of weakness   ESRD on HD status post Left AKA (B/L BKA )  per SW refusing ALDAIR  10/21 BM  patient seen in Dialysis states with good PO intake       Factors impacting intake: [x ] none [ ] nausea  [ ] vomiting [ ] diarrhea [ ] constipation  [ ]chewing problems [ ] swallowing issues  [ ] other:     Diet Prescription: Diet, DASH/TLC:   Sodium & Cholesterol Restricted  Consistent Carbohydrate {Evening Snack}  For patients receiving Renal Replacement - No Protein Restr, No Conc K, No Conc Phos, Low Sodium (RENAL)  Supplement Feeding Modality:  Oral  Nepro Cans or Servings Per Day:  1       Frequency:  Three Times a day (10-19-23 @ 15:26)    Intake: up to 100% per flow sheet    Current Weight: Weight 10/23 143# 64.9kg no edema       Pertinent Medications: MEDICATIONS  (STANDING):  acetaminophen     Tablet .. 1000 milliGRAM(s) Oral every 8 hours  atorvastatin 40 milliGRAM(s) Oral at bedtime  cefpodoxime 200 milliGRAM(s) Oral every 24 hours  dextrose 50% Injectable 25 Gram(s) IV Push once  dextrose 50% Injectable 12.5 Gram(s) IV Push once  dorzolamide 2%/timolol 0.5% Ophthalmic Solution 1 Drop(s) Both EYES two times a day  epoetin deidre (PROCRIT) Injectable 09120 Unit(s) IV Push <User Schedule>  gabapentin 100 milliGRAM(s) Oral two times a day  glucagon  Injectable 1 milliGRAM(s) IntraMuscular once  heparin   Injectable 5000 Unit(s) SubCutaneous every 12 hours  HYDROmorphone   Tablet 4 milliGRAM(s) Oral every 6 hours  insulin glargine Injectable (LANTUS) 13 Unit(s) SubCutaneous at bedtime  insulin lispro (ADMELOG) corrective regimen sliding scale   SubCutaneous Before meals and at bedtime  insulin lispro Injectable (ADMELOG) 5 Unit(s) SubCutaneous three times a day before meals  lactobacillus acidophilus 1 Tablet(s) Oral every 12 hours    MEDICATIONS  (PRN):  aluminum hydroxide/magnesium hydroxide/simethicone Suspension 30 milliLiter(s) Oral every 4 hours PRN Dyspepsia  HYDROmorphone  Injectable 0.5 milliGRAM(s) IV Push every 4 hours PRN Severe Pain (7 - 10)  melatonin 3 milliGRAM(s) Oral at bedtime PRN Insomnia  midodrine. 10 milliGRAM(s) Oral every 8 hours PRN for BP<100    Pertinent Labs: POCT 124, 218, 174 BUN 49 Creat 10  Skin: ecchymosis     Estimated Needs:   [x ] no change since previous assessment based on 145# 59.8kg  30-35kcals/kg 1794-2093kcals and 1.4-1.6gms protein/kg 83-95 gms protein  [ ] recalculated:     Previous Nutrition Diagnosis:   [ x] altered nutrition related labs    [x ] Increased Nutrient Needs      Nutrition Diagnosis is [x ] ongoing  [ ] resolved [ ] not applicable     New Nutrition Diagnosis: [ ] not applicable       Interventions:   Recommend  [ ] Change Diet To:  [ ] Nutrition Supplement  [ ] Nutrition Support  [x ] Other: continue diet as ordered with supplement, recommend renal MVI     Monitoring and Evaluation:   [x ] PO intake [ x ] Tolerance to diet prescription [ x ] weights [ x ] labs[ x ] follow up per protocol  [ ] other:

## 2023-10-23 NOTE — PROGRESS NOTE ADULT - ASSESSMENT
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 Sin/Dr. Singh) Presents to ED [facility] 10/10 with weakness for 2 weeks. 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 Wound care consult and ID consults are requested .

## 2023-10-23 NOTE — PROGRESS NOTE ADULT - SUBJECTIVE AND OBJECTIVE BOX
Patient is a 48y Male whom presented to the hospital with esrd on hd     PAST MEDICAL & SURGICAL HISTORY:  ESRD on dialysis      H/O hypotension      Diabetes mellitus      Leg wound, left      Steal syndrome dialysis vascular access      Gangrene of finger of right hand      PVD (peripheral vascular disease)      Anemia of chronic disease      Constipation      HLD (hyperlipidemia)      S/P BKA (below knee amputation) bilateral      AV fistula          MEDICATIONS  (STANDING):  atorvastatin 40 milliGRAM(s) Oral at bedtime  collagenase Ointment 1 Application(s) Topical daily  dextrose 5%. 1000 milliLiter(s) (50 mL/Hr) IV Continuous <Continuous>  dextrose 5%. 1000 milliLiter(s) (100 mL/Hr) IV Continuous <Continuous>  dextrose 50% Injectable 25 Gram(s) IV Push once  dextrose 50% Injectable 25 Gram(s) IV Push once  dextrose 50% Injectable 12.5 Gram(s) IV Push once  dorzolamide 2%/timolol 0.5% Ophthalmic Solution 1 Drop(s) Both EYES two times a day  glucagon  Injectable 1 milliGRAM(s) IntraMuscular once  heparin   Injectable 5000 Unit(s) SubCutaneous every 12 hours  insulin lispro (ADMELOG) corrective regimen sliding scale   SubCutaneous every 6 hours  lactobacillus acidophilus 1 Tablet(s) Oral every 12 hours  midodrine. 10 milliGRAM(s) Oral three times a day  pantoprazole    Tablet 40 milliGRAM(s) Oral before breakfast  piperacillin/tazobactam IVPB.. 3.375 Gram(s) IV Intermittent every 12 hours  senna 2 Tablet(s) Oral at bedtime      Allergies    No Known Drug Allergies  [place] (Rash)    Intolerances        SOCIAL HISTORY:  Denies ETOh,Smoking,     FAMILY HISTORY:      REVIEW OF SYSTEMS:    CONSTITUTIONAL: No weakness, fevers or chills  EYES/ENT: No visual changes;  no throat pain   NECK: No pain or stiffness  RESPIRATORY: No cough, wheezing, hemoptysis; No shortness of breath  CARDIOVASCULAR: No chest pain or palpitations  GASTROINTESTINAL: No abdominal or epigastric pain. No nausea, vomiting,                                                                                9.0    9.12  )-----------( 644      ( 23 Oct 2023 09:28 )             28.9       CBC Full  -  ( 23 Oct 2023 09:28 )  WBC Count : 9.12 K/uL  RBC Count : 3.00 M/uL  Hemoglobin : 9.0 g/dL  Hematocrit : 28.9 %  Platelet Count - Automated : 644 K/uL  Mean Cell Volume : 96.3 fl  Mean Cell Hemoglobin : 30.0 pg  Mean Cell Hemoglobin Concentration : 31.1 gm/dL  Auto Neutrophil # : x  Auto Lymphocyte # : x  Auto Monocyte # : x  Auto Eosinophil # : x  Auto Basophil # : x  Auto Neutrophil % : x  Auto Lymphocyte % : x  Auto Monocyte % : x  Auto Eosinophil % : x  Auto Basophil % : x      10-23    135  |  99  |  71<H>  ----------------------------<  116<H>  5.4<H>   |  21<L>  |  12.00<H>    Ca    9.3      23 Oct 2023 09:28    TPro  7.7  /  Alb  2.0<L>  /  TBili  0.3  /  DBili  x   /  AST  24  /  ALT  25  /  AlkPhos  157<H>  10-23      CAPILLARY BLOOD GLUCOSE      POCT Blood Glucose.: 123 mg/dL (23 Oct 2023 17:25)  POCT Blood Glucose.: 131 mg/dL (23 Oct 2023 12:58)  POCT Blood Glucose.: 124 mg/dL (23 Oct 2023 08:03)  POCT Blood Glucose.: 218 mg/dL (22 Oct 2023 21:13)      Vital Signs Last 24 Hrs  T(C): 36.4 (23 Oct 2023 13:54), Max: 36.8 (23 Oct 2023 09:45)  T(F): 97.6 (23 Oct 2023 13:54), Max: 98.2 (23 Oct 2023 09:45)  HR: 77 (23 Oct 2023 13:54) (60 - 77)  BP: 156/62 (23 Oct 2023 13:54) (78/47 - 156/62)  BP(mean): --  RR: 18 (23 Oct 2023 13:54) (17 - 18)  SpO2: 98% (23 Oct 2023 13:54) (95% - 100%)    Parameters below as of 23 Oct 2023 13:54  Patient On (Oxygen Delivery Method): room air        Urinalysis Basic - ( 23 Oct 2023 09:28 )    Color: x / Appearance: x / SG: x / pH: x  Gluc: 116 mg/dL / Ketone: x  / Bili: x / Urobili: x   Blood: x / Protein: x / Nitrite: x   Leuk Esterase: x / RBC: x / WBC x   Sq Epi: x / Non Sq Epi: x / Bacteria: x                  PHYSICAL EXAM:    Constitutional: NAD  HEENT: conjunctive   clear   Neck:  No JVD  Respiratory: CTAB  Cardiovascular: S1 and S2  Gastrointestinal: BS+, soft, NT/ND  Extremities: No peripheral edema  Neurological: no focal deficits  Psychiatric: Normal mood, normal affect  : No [place]  Skin: No rashes   S/P BKA (below knee amputation) bilateral  AV fistula  LABS:

## 2023-10-23 NOTE — PROGRESS NOTE ADULT - ASSESSMENT
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 Sin/Dr. Singh) Presents to ED [facility] 10/10 with weakness for 2 weeks. 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 Wound care consult and ID consults are requested . (10 Oct 2023 12:52)      esrd on hd for mwf   Excess fluids and waste products will be removed from your blood; your electrolytes will be balanced; your blood pressure will be controlled.    Admit for septic workup and ID evaluation,send blood and urine cx,serial lactate levels,monitor vitals closley,ivfs hydration,monitor urine output and renal profile,iv abx as per id cons  cefpodoxime 200 milliGRAM(s) Oral every 24 hours    BP monitoring,continue current  meds, low salt diet,followup with PMD in 1-2 weeks  midodrine. 10 milliGRAM(s) Oral three times a day        ANEMIA PLAN:  Anemia of chronic disease:  We will continue epo aiming for a HCT of 32-36 %.   We will monitor Iron stores, B12 and RBC folate .    dvt heparin   Injectable 5000 Unit(s) SubCutaneous every 12 hours  Taken to OR for urgent left knee disarticulation for gas gangrne  POD#4

## 2023-10-23 NOTE — PROGRESS NOTE ADULT - ASSESSMENT
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 Sin/Dr. Singh) Presents to ED [facility] 10/10 with weakness for 2 weeks    anemia  esrd  weakness  DM  BKA  PAD  PVD  Pain    on ABX  post op care -   PMR cx noted - Pain management  AKA  vs noted  labs reviewed    full code  lives with family at home - in [place]  HD as per renal  pain relief  oral hygiene  skin care  wound care  assist with needs

## 2023-10-23 NOTE — PROGRESS NOTE ADULT - SUBJECTIVE AND OBJECTIVE BOX
Date/Time Patient Seen:  		  Referring MD:   Data Reviewed	       Patient is a 48y old  Male who presents with a chief complaint of weakness (22 Oct 2023 19:09)      Subjective/HPI     PAST MEDICAL & SURGICAL HISTORY:  ESRD on dialysis    H/O hypotension    Diabetes mellitus    Leg wound, left    Steal syndrome dialysis vascular access    Gangrene of finger of right hand    PVD (peripheral vascular disease)    Anemia of chronic disease    Constipation    HLD (hyperlipidemia)    S/P BKA (below knee amputation) bilateral    AV fistula          Medication list         MEDICATIONS  (STANDING):  acetaminophen     Tablet .. 1000 milliGRAM(s) Oral every 8 hours  atorvastatin 40 milliGRAM(s) Oral at bedtime  cefpodoxime 200 milliGRAM(s) Oral every 24 hours  dextrose 50% Injectable 12.5 Gram(s) IV Push once  dextrose 50% Injectable 25 Gram(s) IV Push once  dorzolamide 2%/timolol 0.5% Ophthalmic Solution 1 Drop(s) Both EYES two times a day  epoetin deidre (PROCRIT) Injectable 15083 Unit(s) IV Push <User Schedule>  gabapentin 100 milliGRAM(s) Oral two times a day  glucagon  Injectable 1 milliGRAM(s) IntraMuscular once  heparin   Injectable 5000 Unit(s) SubCutaneous every 12 hours  HYDROmorphone   Tablet 4 milliGRAM(s) Oral every 6 hours  insulin glargine Injectable (LANTUS) 13 Unit(s) SubCutaneous at bedtime  insulin lispro (ADMELOG) corrective regimen sliding scale   SubCutaneous Before meals and at bedtime  insulin lispro Injectable (ADMELOG) 5 Unit(s) SubCutaneous three times a day before meals  lactobacillus acidophilus 1 Tablet(s) Oral every 12 hours    MEDICATIONS  (PRN):  aluminum hydroxide/magnesium hydroxide/simethicone Suspension 30 milliLiter(s) Oral every 4 hours PRN Dyspepsia  HYDROmorphone  Injectable 0.5 milliGRAM(s) IV Push every 4 hours PRN Severe Pain (7 - 10)  melatonin 3 milliGRAM(s) Oral at bedtime PRN Insomnia  midodrine. 10 milliGRAM(s) Oral every 8 hours PRN for BP<100         Vitals log        ICU Vital Signs Last 24 Hrs  T(C): 36.5 (23 Oct 2023 05:00), Max: 37.2 (22 Oct 2023 11:09)  T(F): 97.7 (23 Oct 2023 05:00), Max: 99 (22 Oct 2023 11:09)  HR: 60 (23 Oct 2023 05:00) (60 - 82)  BP: 112/61 (23 Oct 2023 05:00) (85/54 - 139/70)  BP(mean): --  ABP: --  ABP(mean): --  RR: 17 (23 Oct 2023 05:00) (17 - 18)  SpO2: 95% (23 Oct 2023 05:00) (93% - 95%)    O2 Parameters below as of 23 Oct 2023 05:00  Patient On (Oxygen Delivery Method): room air                 Input and Output:  I&O's Detail      Lab Data                        8.2    11.74 )-----------( 591      ( 22 Oct 2023 06:53 )             27.1     10-22    136  |  99  |  49<H>  ----------------------------<  168<H>  5.0   |  26  |  10.00<H>    Ca    8.6      22 Oct 2023 06:53              Review of Systems	      Objective     Physical Examination    heart s1s2  lung dc BS  head nc      Pertinent Lab findings & Imaging      Barak:  NO   Adequate UO     I&O's Detail           Discussed with:     Cultures:	        Radiology

## 2023-10-24 ENCOUNTER — TRANSCRIPTION ENCOUNTER (OUTPATIENT)
Age: 48
End: 2023-10-24

## 2023-10-24 VITALS
HEART RATE: 80 BPM | TEMPERATURE: 98 F | DIASTOLIC BLOOD PRESSURE: 61 MMHG | SYSTOLIC BLOOD PRESSURE: 108 MMHG | OXYGEN SATURATION: 97 % | RESPIRATION RATE: 16 BRPM

## 2023-10-24 RX ORDER — HYDROMORPHONE HYDROCHLORIDE 2 MG/ML
1 INJECTION INTRAMUSCULAR; INTRAVENOUS; SUBCUTANEOUS
Qty: 0 | Refills: 0 | DISCHARGE
Start: 2023-10-24

## 2023-10-24 RX ORDER — CEFPODOXIME PROXETIL 100 MG
1 TABLET ORAL
Qty: 5 | Refills: 0
Start: 2023-10-24 | End: 2023-10-28

## 2023-10-24 RX ORDER — ACETAMINOPHEN 500 MG
2 TABLET ORAL
Qty: 0 | Refills: 0 | DISCHARGE
Start: 2023-10-24

## 2023-10-24 RX ORDER — LACTOBACILLUS ACIDOPHILUS 100MM CELL
1 CAPSULE ORAL
Qty: 0 | Refills: 0 | DISCHARGE
Start: 2023-10-24

## 2023-10-24 RX ORDER — GABAPENTIN 400 MG/1
1 CAPSULE ORAL
Qty: 28 | Refills: 0
Start: 2023-10-24 | End: 2023-11-06

## 2023-10-24 RX ADMIN — Medication 200 MILLIGRAM(S): at 05:59

## 2023-10-24 RX ADMIN — Medication 1: at 17:36

## 2023-10-24 RX ADMIN — HYDROMORPHONE HYDROCHLORIDE 4 MILLIGRAM(S): 2 INJECTION INTRAMUSCULAR; INTRAVENOUS; SUBCUTANEOUS at 05:58

## 2023-10-24 RX ADMIN — DORZOLAMIDE HYDROCHLORIDE TIMOLOL MALEATE 1 DROP(S): 20; 5 SOLUTION/ DROPS OPHTHALMIC at 17:36

## 2023-10-24 RX ADMIN — Medication 1 TABLET(S): at 05:59

## 2023-10-24 RX ADMIN — Medication 1 TABLET(S): at 17:36

## 2023-10-24 RX ADMIN — Medication 1: at 12:31

## 2023-10-24 RX ADMIN — Medication 5 UNIT(S): at 17:36

## 2023-10-24 RX ADMIN — GABAPENTIN 100 MILLIGRAM(S): 400 CAPSULE ORAL at 17:35

## 2023-10-24 RX ADMIN — HEPARIN SODIUM 5000 UNIT(S): 5000 INJECTION INTRAVENOUS; SUBCUTANEOUS at 17:35

## 2023-10-24 RX ADMIN — HYDROMORPHONE HYDROCHLORIDE 4 MILLIGRAM(S): 2 INJECTION INTRAMUSCULAR; INTRAVENOUS; SUBCUTANEOUS at 06:29

## 2023-10-24 RX ADMIN — Medication 1000 MILLIGRAM(S): at 05:59

## 2023-10-24 RX ADMIN — HYDROMORPHONE HYDROCHLORIDE 4 MILLIGRAM(S): 2 INJECTION INTRAMUSCULAR; INTRAVENOUS; SUBCUTANEOUS at 12:31

## 2023-10-24 RX ADMIN — DORZOLAMIDE HYDROCHLORIDE TIMOLOL MALEATE 1 DROP(S): 20; 5 SOLUTION/ DROPS OPHTHALMIC at 05:59

## 2023-10-24 RX ADMIN — HYDROMORPHONE HYDROCHLORIDE 4 MILLIGRAM(S): 2 INJECTION INTRAMUSCULAR; INTRAVENOUS; SUBCUTANEOUS at 17:35

## 2023-10-24 RX ADMIN — Medication 5 UNIT(S): at 12:31

## 2023-10-24 RX ADMIN — GABAPENTIN 100 MILLIGRAM(S): 400 CAPSULE ORAL at 05:58

## 2023-10-24 RX ADMIN — HYDROMORPHONE HYDROCHLORIDE 4 MILLIGRAM(S): 2 INJECTION INTRAMUSCULAR; INTRAVENOUS; SUBCUTANEOUS at 13:01

## 2023-10-24 RX ADMIN — HYDROMORPHONE HYDROCHLORIDE 4 MILLIGRAM(S): 2 INJECTION INTRAMUSCULAR; INTRAVENOUS; SUBCUTANEOUS at 18:05

## 2023-10-24 RX ADMIN — Medication 1000 MILLIGRAM(S): at 06:29

## 2023-10-24 RX ADMIN — Medication 1000 MILLIGRAM(S): at 15:30

## 2023-10-24 RX ADMIN — HEPARIN SODIUM 5000 UNIT(S): 5000 INJECTION INTRAVENOUS; SUBCUTANEOUS at 05:59

## 2023-10-24 RX ADMIN — Medication 5 UNIT(S): at 08:37

## 2023-10-24 RX ADMIN — Medication 1000 MILLIGRAM(S): at 15:00

## 2023-10-24 NOTE — PROGRESS NOTE ADULT - PROBLEM SELECTOR PLAN 2
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
S/P  urgent left knee disarticulation 10/11/2023    Weaned of  pressors; midodrine added  Daily dressing changes by vascular team  RTOR 10/19 for AKA closure
S/P  urgent left knee disarticulation 10/11/2023    Weaned of  pressors; midodrine added  Daily dressing changes by vascular team s/p  AKA closure -cleared for d/c by vascular team
S/P  urgent left knee disarticulation 10/11/2023    Weaned of  pressors; midodrine added  Daily dressing changes by vascular team s/p  AKA closure -cleared for d/c by vascular team
[FreeTextEntry1] : Examination:\par Constitutional: normal, no apparent distress\par Eyes: normal conjunctiva b/l, no ptosis, visual fields full\par Respiratory: no respiratory distress, normal effort, normal auscultation\par Cardiovascular: normal rate, rhythm, no murmurs\par Neck: supple, no masses\par Vascular: carotids normal\par Skin: normal color, no rashes\par Psych: normal mood, affect\par \par Neurological:\par Memory: normal memory, oriented to person, place, time\par Language intact/no aphasia\par Cranial Nerves: II-XII intact, Pupils equally round and reactive to light, ocular muscles/movements intact, no ptosis, no facial weakness, tongue protrudes normally in the midline, \par Motor: normal tone, no pronator drift, full strength in all four extremities in the proximal and distal muscle groups\par Coordination: Fine motor movements intact, rapid alternating movements intact, finger to nose intact bilaterally\par Sensory: intact to light touch, vibration, joint position sense, negative Romberg examination\par DTRs: symmetric, 2+ in b/l triceps, 2+ in b/l biceps, 2+ in b/l brachioradialis, 2+ in bilateral patellars, 2+ in bilateral Achilles, Babinskis negative bilaterally\par Gait: narrow based, steady, able to walk on heels, toes, tandem gait\par \par 
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
S/P  urgent left knee disarticulation 10/11/2023    Weaned of  pressors; midodrine added  Daily dressing changes by vascular team  RTOR 10/19 for AKA closure
S/P  urgent left knee disarticulation 10/11/2023    Weaned of  pressors; midodrine added  Daily dressing changes by vascular team  RTOR 10/19 for AKA closure
S/P  urgent left knee disarticulation 10/11/2023    Weaned of  pressors; midodrine added  Daily dressing changes by vascular team s/p  AKA closure -cleared for d/c by vascular team
S/P  urgent left knee disarticulation 10/11/2023    Weaned of  pressors; midodrine added  Daily dressing changes by vascular team s/p  AKA closure -cleared for d/c by vascular team
Case d/w surgery and vacular team ,planned for OR today . medically optimized for urgent surgery ,d/w pulm ,cardiologist and nephrologist .ID cons is informed and recommended 500 mg of Vancomycin x 1 iv
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
S/P  urgent left knee disarticulation 10/11/2023    Weaned of  pressors; midodrine added  Daily dressing changes by vascular team  RTOR 10/19 for AKA closure
S/P  urgent left knee disarticulation 10/11/2023    Weaned of  pressors; midodrine added  Daily dressing changes by vascular team  RTOR 10/19 for AKA closure
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

## 2023-10-24 NOTE — SOCIAL WORK PROGRESS NOTE - NSSWPROGRESSNOTE_GEN_ALL_CORE
DARRIAN met with this pt and his wife at bedside- Pt just seen by PT and OT, they are both recommending BHUPENDRA. Pt continues to refuse bhupendra, says that between his spouse and sons will help care for him at home and that he has all equipment- wheelchair, commode, walker. DARRIAN spoke with Dottie at [facility]- she is aware pt is recommended for BHUPENDRA but is refusing, and will need assistance at dialysis to get from his wheelchair to the dialysis chair, she requests DC ppwk to be faxed to  and reports understanding and said they cannot refuse to accept him back. DARRIAN [facility] to let him know that DC is for today, and confirmed they will see him back at his home to bring him to dialysis tomorrow at 230pm. Pt requesting DC home today, again declines BHUPENDRA even though it is recommended by all team members, MD Dr villatoro knows. SW to remain available.

## 2023-10-24 NOTE — DISCHARGE NOTE NURSING/CASE MANAGEMENT/SOCIAL WORK - PATIENT PORTAL LINK FT
You can access the FollowMyHealth Patient Portal offered by [facility] by registering at the following website: [email or web address]. By joining [facility]s FollowMyHealth portal, you will also be able to view your health information using other applications (apps) compatible with our system.

## 2023-10-24 NOTE — PROGRESS NOTE ADULT - SUBJECTIVE AND OBJECTIVE BOX
PROGRESS NOTE  Patient is a 48y old  Male who presents with a chief complaint of weakness (24 Oct 2023 09:43)  Chart and available morning labs /imaging are reviewed electronically , urgent issues addressed . More information  is being added upon completion of rounds , when more information is collected and management discussed with consultants , medical staff and social service/case management on the floor     OVERNIGHT  No new issues reported by medical staff . All above noted Patient is resting in a bed comfortably .INSISTS ON D/C HOME AND REFUSES ALDAIR  .No distress noted   D/W CM & SW   HPI:  47 yo malewith PMH of DM2, b/l BKA, ESRD (on HD MWF) ,infection  right third finger 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 Sin/Dr. Singh) Presents to ED [facility] 10/10 with weakness for 2 weeks. 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 Wound care consult and ID consults are requested . (10 Oct 2023 12:52)    PAST MEDICAL & SURGICAL HISTORY:  ESRD on dialysis      H/O hypotension      Diabetes mellitus      Leg wound, left      Steal syndrome dialysis vascular access      Gangrene of finger of right hand      PVD (peripheral vascular disease)      Anemia of chronic disease      Constipation      HLD (hyperlipidemia)      S/P BKA (below knee amputation) bilateral      AV fistula          MEDICATIONS  (STANDING):  acetaminophen     Tablet .. 1000 milliGRAM(s) Oral every 8 hours  atorvastatin 40 milliGRAM(s) Oral at bedtime  cefpodoxime 200 milliGRAM(s) Oral every 24 hours  dextrose 50% Injectable 12.5 Gram(s) IV Push once  dextrose 50% Injectable 25 Gram(s) IV Push once  dorzolamide 2%/timolol 0.5% Ophthalmic Solution 1 Drop(s) Both EYES two times a day  epoetin deidre (PROCRIT) Injectable 62209 Unit(s) IV Push <User Schedule>  gabapentin 100 milliGRAM(s) Oral two times a day  glucagon  Injectable 1 milliGRAM(s) IntraMuscular once  heparin   Injectable 5000 Unit(s) SubCutaneous every 12 hours  HYDROmorphone   Tablet 4 milliGRAM(s) Oral every 6 hours  insulin glargine Injectable (LANTUS) 13 Unit(s) SubCutaneous at bedtime  insulin lispro (ADMELOG) corrective regimen sliding scale   SubCutaneous Before meals and at bedtime  insulin lispro Injectable (ADMELOG) 5 Unit(s) SubCutaneous three times a day before meals  lactobacillus acidophilus 1 Tablet(s) Oral every 12 hours    MEDICATIONS  (PRN):  aluminum hydroxide/magnesium hydroxide/simethicone Suspension 30 milliLiter(s) Oral every 4 hours PRN Dyspepsia  HYDROmorphone  Injectable 0.5 milliGRAM(s) IV Push every 4 hours PRN Severe Pain (7 - 10)  melatonin 3 milliGRAM(s) Oral at bedtime PRN Insomnia  midodrine. 10 milliGRAM(s) Oral every 8 hours PRN for BP<100      OBJECTIVE    T(C): 36.7 (10-24-23 @ 12:37), Max: 37 (10-23-23 @ 21:01)  HR: 80 (10-24-23 @ 12:37) (65 - 80)  BP: 108/61 (10-24-23 @ 12:37) (108/61 - 136/68)  RR: 16 (10-24-23 @ 12:37) (16 - 17)  SpO2: 97% (10-24-23 @ 12:37) (97% - 98%)  Wt(kg): --  I&O's Summary    23 Oct 2023 07:01  -  24 Oct 2023 07:00  --------------------------------------------------------  IN: 0 mL / OUT: 0 mL / NET: 0 mL          REVIEW OF SYSTEMS:  CONSTITUTIONAL: No fever, weight loss, or fatigue  EYES: No eye pain, visual disturbances, or discharge  ENMT:   No sinus or throat pain  NECK: No pain or stiffness  RESPIRATORY: No cough, wheezing, chills or hemoptysis; No shortness of breath  CARDIOVASCULAR: No chest pain, palpitations, dizziness, or leg swelling  GASTROINTESTINAL: No abdominal pain. No nausea, vomiting; No diarrhea or constipation. No melena or hematochezia.  GENITOURINARY: No dysuria, frequency, hematuria, or incontinence  NEUROLOGICAL: No headaches, memory loss, loss of strength, numbness, or tremors  SKIN: No itching, burning, rashes, or lesions   MUSCULOSKELETAL: No joint pain or swelling; No muscle, back, or extremity pain    PHYSICAL EXAM:  Appearance: NAD. VS past 24 hrs -as above   HEENT:   Moist oral mucosa. Conjunctiva clear b/l.   Neck : supple  Respiratory: Lungs CTAB.  Gastrointestinal:  Soft, nontender. No rebound. No rigidity. BS present	  Cardiovascular: RRR ,S1S2 present  wounds ,skin lesions-See skin assesment flow sheet   LABS:                        9.0    9.12  )-----------( 644      ( 23 Oct 2023 09:28 )             28.9     10-23    135  |  99  |  71<H>  ----------------------------<  116<H>  5.4<H>   |  21<L>  |  12.00<H>    Ca    9.3      23 Oct 2023 09:28    TPro  7.7  /  Alb  2.0<L>  /  TBili  0.3  /  DBili  x   /  AST  24  /  ALT  25  /  AlkPhos  157<H>  10-23    CAPILLARY BLOOD GLUCOSE      POCT Blood Glucose.: 152 mg/dL (24 Oct 2023 11:43)  POCT Blood Glucose.: 138 mg/dL (24 Oct 2023 08:25)  POCT Blood Glucose.: 104 mg/dL (23 Oct 2023 21:15)  POCT Blood Glucose.: 123 mg/dL (23 Oct 2023 17:25)      Urinalysis Basic - ( 23 Oct 2023 09:28 )    Color: x / Appearance: x / SG: x / pH: x  Gluc: 116 mg/dL / Ketone: x  / Bili: x / Urobili: x   Blood: x / Protein: x / Nitrite: x   Leuk Esterase: x / RBC: x / WBC x   Sq Epi: x / Non Sq Epi: x / Bacteria: x        RADIOLOGY & ADDITIONAL TESTS:   reviewed elctronically  ASSESSMENT/PLAN: 	    Patient was seen and examined on a day of discharge . Plan of care , discharge medications and recommendations discussed with consultants and clearance for discharge obtained .Social service , case management  and medical staff are aware of plan. Family is notified. Discharge summary  is  prepared electronically-see separate document prepared by me .75minutes spent on this visit, 50% visit time spent in care co-ordination with other attendings and counselling patient  I have discussed care plan with patient and HCP,expressed understanding of problems treatment and their effect and side effects, alternatives in detail,I have asked if they have any questions and concerns and appropriately addressed them to best of my ability

## 2023-10-24 NOTE — PROGRESS NOTE ADULT - SUBJECTIVE AND OBJECTIVE BOX
Date/Time Patient Seen:  		  Referring MD:   Data Reviewed	       Patient is a 48y old  Male who presents with a chief complaint of weakness (23 Oct 2023 20:55)      Subjective/HPI     PAST MEDICAL & SURGICAL HISTORY:  ESRD on dialysis    H/O hypotension    Diabetes mellitus    Leg wound, left    Steal syndrome dialysis vascular access    Gangrene of finger of right hand    PVD (peripheral vascular disease)    Anemia of chronic disease    Constipation    HLD (hyperlipidemia)    S/P BKA (below knee amputation) bilateral    AV fistula          Medication list         MEDICATIONS  (STANDING):  acetaminophen     Tablet .. 1000 milliGRAM(s) Oral every 8 hours  atorvastatin 40 milliGRAM(s) Oral at bedtime  cefpodoxime 200 milliGRAM(s) Oral every 24 hours  dextrose 50% Injectable 25 Gram(s) IV Push once  dextrose 50% Injectable 12.5 Gram(s) IV Push once  dorzolamide 2%/timolol 0.5% Ophthalmic Solution 1 Drop(s) Both EYES two times a day  epoetin deidre (PROCRIT) Injectable 69438 Unit(s) IV Push <User Schedule>  gabapentin 100 milliGRAM(s) Oral two times a day  glucagon  Injectable 1 milliGRAM(s) IntraMuscular once  heparin   Injectable 5000 Unit(s) SubCutaneous every 12 hours  HYDROmorphone   Tablet 4 milliGRAM(s) Oral every 6 hours  insulin glargine Injectable (LANTUS) 13 Unit(s) SubCutaneous at bedtime  insulin lispro (ADMELOG) corrective regimen sliding scale   SubCutaneous Before meals and at bedtime  insulin lispro Injectable (ADMELOG) 5 Unit(s) SubCutaneous three times a day before meals  lactobacillus acidophilus 1 Tablet(s) Oral every 12 hours    MEDICATIONS  (PRN):  aluminum hydroxide/magnesium hydroxide/simethicone Suspension 30 milliLiter(s) Oral every 4 hours PRN Dyspepsia  HYDROmorphone  Injectable 0.5 milliGRAM(s) IV Push every 4 hours PRN Severe Pain (7 - 10)  melatonin 3 milliGRAM(s) Oral at bedtime PRN Insomnia  midodrine. 10 milliGRAM(s) Oral every 8 hours PRN for BP<100         Vitals log        ICU Vital Signs Last 24 Hrs  T(C): 36.6 (24 Oct 2023 05:00), Max: 37 (23 Oct 2023 21:01)  T(F): 97.8 (24 Oct 2023 05:00), Max: 98.6 (23 Oct 2023 21:01)  HR: 65 (24 Oct 2023 05:00) (61 - 77)  BP: 136/68 (24 Oct 2023 05:00) (78/47 - 156/62)  BP(mean): --  ABP: --  ABP(mean): --  RR: 17 (24 Oct 2023 05:00) (17 - 18)  SpO2: 98% (24 Oct 2023 05:00) (98% - 100%)    O2 Parameters below as of 24 Oct 2023 05:00  Patient On (Oxygen Delivery Method): room air                 Input and Output:  I&O's Detail    23 Oct 2023 07:01  -  24 Oct 2023 06:51  --------------------------------------------------------  IN:  Total IN: 0 mL    OUT:    Other (mL): 0 mL  Total OUT: 0 mL    Total NET: 0 mL          Lab Data                        9.0    9.12  )-----------( 644      ( 23 Oct 2023 09:28 )             28.9     10-23    135  |  99  |  71<H>  ----------------------------<  116<H>  5.4<H>   |  21<L>  |  12.00<H>    Ca    9.3      23 Oct 2023 09:28    TPro  7.7  /  Alb  2.0<L>  /  TBili  0.3  /  DBili  x   /  AST  24  /  ALT  25  /  AlkPhos  157<H>  10-23            Review of Systems	      Objective     Physical Examination  heart s1s2  lung dc BS        Pertinent Lab findings & Imaging      Barak:  NO   Adequate UO     I&O's Detail    23 Oct 2023 07:01  -  24 Oct 2023 06:51  --------------------------------------------------------  IN:  Total IN: 0 mL    OUT:    Other (mL): 0 mL  Total OUT: 0 mL    Total NET: 0 mL               Discussed with:     Cultures:	        Radiology

## 2023-10-24 NOTE — PATIENT CHOICE NOTE. - NSPTCHOICENOTES_GEN_ALL_CORE
Pt reports that he was going to outpatient PT at [address] [place] [address]. SW left folder at bedside should pt wish to receive home PT when stable for dc.
Pt wishes to go home with vendor he used after last admission.  Chart reviewed, pt had [facility], referral sent at present, awaiting response

## 2023-10-24 NOTE — PROGRESS NOTE ADULT - ASSESSMENT
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 Sin/Dr. Singh) Presents to ED [facility] 10/10 with weakness for 2 weeks    anemia  esrd  weakness  DM  BKA  PAD  PVD  Pain    on ABX - PO  SW follow up - dc planning  post op care - AKA  PMR cx noted - Pain management    full code  lives with family at home - in [place] as per renal  pain relief  oral hygiene  skin care  wound care  assist with needs

## 2023-10-24 NOTE — PROGRESS NOTE ADULT - REASON FOR ADMISSION
weakness

## 2023-10-24 NOTE — PROGRESS NOTE ADULT - PROVIDER SPECIALTY LIST ADULT
Infectious Disease
Infectious Disease
Nephrology
Palliative Care
Pulmonology
Pulmonology
Vascular Surgery
Cardiology
Cardiology
Critical Care
Hospitalist
Infectious Disease
Infectious Disease
Nephrology
Nephrology
Palliative Care
Physiatry
Pulmonology
Vascular Surgery
Critical Care
Infectious Disease
Infectious Disease
Nephrology
Palliative Care
Pulmonology
Vascular Surgery
Critical Care
Hospitalist
Nephrology
Palliative Care
Vascular Surgery
Hospitalist
Nephrology
Nephrology
Hospitalist
Surgery
Hospitalist
Internal Medicine
Hospitalist
Cardiology

## 2023-10-24 NOTE — PROGRESS NOTE ADULT - PROBLEM SELECTOR PLAN 3
Left lower extremity infected wound with concern for necrotizing faccitis , S/P  urgent left knee disarticulation 10/11/2023  Hypotension requiring vasopressors  Maintain ICU care,on iv abx  , ID and wound care consults appreciated Follow cultures ,serial cbc
Left lower extremity infected wound with concern for necrotizing faccitis , S/P  urgent left knee disarticulation s/p Hypotension requiring vasopressors ,now off  Maintain ICU care,on iv abx  , ID and wound care consults appreciated Follow cultures ,serial cbc
Left lower extremity infected wound with concern for necrotizing faccitis , S/P  urgent left knee disarticulation 10/11/2023  Hypotension requiring vasopressors  Maintain ICU care,on iv abx  , ID and wound care consults appreciated Follow cultures ,serial cbc
Left lower extremity infected wound with concern for necrotizing faccitis , S/P  urgent left knee disarticulation 10/11/2023  Hypotension requiring vasopressors  Maintain ICU care,on iv abx  , ID and wound care consults appreciated Follow cultures ,serial cbc
Left lower extremity infected wound with concern for necrotizing faccitis , S/P  urgent left knee disarticulation s/p Hypotension requiring vasopressors ,now off  Maintain ICU care,on iv abx  , ID and wound care consults appreciated Follow cultures ,serial cbc
Left lower extremity infected wound with concern for necrotizing faccitis , S/P  urgent left knee disarticulation 10/11/2023  Hypotension requiring vasopressors  Maintain ICU care,on iv abx  , ID and wound care consults appreciated Follow cultures ,serial cbc
septic workup sent ,started on iv abx in ER , ID and wound care consults obtained

## 2023-10-24 NOTE — CASE MANAGEMENT PROGRESS NOTE - NSCMPROGRESSNOTE_GEN_ALL_CORE
Discussed on rounds this am.  Pt is to see PT again today.  PT continues to recommend [facility].  CM and SW met with patient at bedside after being seen by PT/OT.  Pt continues to decline in-patient rehab services and states his family will be with him at home to assist and his wife is home all day.  He also states he as a RW, WC, commode, and "all the equiptment" he needs at home.  He is in agreement with home care services and is unsure of vendor.  He requests agency from prior d/c and is in agreement for CM to review prior admission for prior agency. ( Pt had [facility] from prior hospitalization this year- referral sent at present).  Pt states he is ready for d/c home today and is aware we are awaiting MD for final decision.  CM will remain available.

## 2023-10-24 NOTE — PROGRESS NOTE ADULT - PROBLEM SELECTOR PROBLEM 8
PVD (peripheral vascular disease)

## 2023-10-24 NOTE — PROGRESS NOTE ADULT - PROBLEM SELECTOR PLAN 9
Gastrointestinal stress ulcer prophylaxis and DVT prophylaxis administered

## 2023-10-24 NOTE — PROGRESS NOTE ADULT - PROBLEM SELECTOR PLAN 1
2/2 TO Left lower extremity infected wound with concern for necrotizing faccitis , S/P  urgent left knee disarticulation 10/11/2023  Hypotension requiring vasopressors  Maintain ICU care,on iv abx  , ID and wound care consults appreciated Follow cultures ,serial cbc
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
2/2 TO Left lower extremity infected wound with concern for necrotizing faccitis , S/P  urgent left knee disarticulation 10/11/2023  Hypotension requiring vasopressors  Maintain ICU care,on iv abx  , ID and wound care consults appreciated Follow cultures ,serial cbc
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
,started on iv abx  , ID and wound care consults appreciated Follow cultures ,serial cbc
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
2/2 TO Left lower extremity infected wound with concern for necrotizing faccitis , S/P  urgent left knee disarticulation 10/11/2023  Hypotension requiring vasopressors  Maintain ICU care,on iv abx  , ID and wound care consults appreciated Follow cultures ,serial cbc
2/2 TO Left lower extremity infected wound with concern for necrotizing faccitis , S/P  urgent left knee disarticulation 10/11/2023  Hypotension requiring vasopressors  Maintain ICU care,on iv abx  , ID and wound care consults appreciated Follow cultures ,serial cbc
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

## 2023-10-24 NOTE — PROGRESS NOTE ADULT - PROBLEM SELECTOR PLAN 8
continue home medications

## 2023-10-24 NOTE — PROGRESS NOTE ADULT - PROBLEM SELECTOR PROBLEM 3
Leg wound, left

## 2023-10-24 NOTE — PROGRESS NOTE ADULT - PROBLEM SELECTOR PLAN 4
HD as per schedule ,case d/w nephrologist Dr Castillo

## 2023-10-24 NOTE — PROGRESS NOTE ADULT - PROBLEM SELECTOR PLAN 6
Accuchecks monitoring and insulin corrective regimen  sliding scale coverage with short acting inslulin, add longacting insulin as needed ,no concentrated sweets diet, serial labs ,HbA1C,education

## 2023-10-24 NOTE — PROGRESS NOTE ADULT - PROBLEM SELECTOR PROBLEM 5
Dehydration

## 2023-10-24 NOTE — DISCHARGE NOTE NURSING/CASE MANAGEMENT/SOCIAL WORK - NSSCTYPOFSERV_GEN_ALL_CORE
Visiting nurse and physical therapy.  Agency will call and arrange first home visit within 48 hours of discharge/ pending dialysis sessions

## 2023-10-24 NOTE — PROGRESS NOTE ADULT - NUTRITIONAL ASSESSMENT
MEDICATIONS  (STANDING):  atorvastatin 40 milliGRAM(s) Oral at bedtime  dextrose 5%. 1000 milliLiter(s) (100 mL/Hr) IV Continuous <Continuous>  dextrose 5%. 1000 milliLiter(s) (50 mL/Hr) IV Continuous <Continuous>  dextrose 50% Injectable 25 Gram(s) IV Push once  dextrose 50% Injectable 12.5 Gram(s) IV Push once  dextrose 50% Injectable 25 Gram(s) IV Push once  dorzolamide 2%/timolol 0.5% Ophthalmic Solution 1 Drop(s) Both EYES two times a day  glucagon  Injectable 1 milliGRAM(s) IntraMuscular once  heparin   Injectable 5000 Unit(s) SubCutaneous every 12 hours  insulin glargine Injectable (LANTUS) 15 Unit(s) SubCutaneous at bedtime  insulin lispro (ADMELOG) corrective regimen sliding scale   SubCutaneous Before meals and at bedtime  insulin lispro Injectable (ADMELOG) 7 Unit(s) SubCutaneous three times a day before meals  lactobacillus acidophilus 1 Tablet(s) Oral every 12 hours  midodrine. 20 milliGRAM(s) Oral three times a day  mupirocin 2% Nasal 1 Application(s) Both Nostrils two times a day  piperacillin/tazobactam IVPB.. 3.375 Gram(s) IV Intermittent every 12 hours
MEDICATIONS  (STANDING):  atorvastatin 40 milliGRAM(s) Oral at bedtime  dextrose 5%. 1000 milliLiter(s) (50 mL/Hr) IV Continuous <Continuous>  dextrose 5%. 1000 milliLiter(s) (100 mL/Hr) IV Continuous <Continuous>  dextrose 50% Injectable 12.5 Gram(s) IV Push once  dextrose 50% Injectable 25 Gram(s) IV Push once  dextrose 50% Injectable 25 Gram(s) IV Push once  dorzolamide 2%/timolol 0.5% Ophthalmic Solution 1 Drop(s) Both EYES two times a day  glucagon  Injectable 1 milliGRAM(s) IntraMuscular once  heparin   Injectable 5000 Unit(s) SubCutaneous every 12 hours  insulin glargine Injectable (LANTUS) 13 Unit(s) SubCutaneous at bedtime  insulin lispro (ADMELOG) corrective regimen sliding scale   SubCutaneous Before meals and at bedtime  insulin lispro Injectable (ADMELOG) 5 Unit(s) SubCutaneous three times a day before meals  lactobacillus acidophilus 1 Tablet(s) Oral every 12 hours  midodrine. 20 milliGRAM(s) Oral three times a day  mupirocin 2% Nasal 1 Application(s) Both Nostrils two times a day  piperacillin/tazobactam IVPB.. 3.375 Gram(s) IV Intermittent every 12 hours
MEDICATIONS  (STANDING):  atorvastatin 40 milliGRAM(s) Oral at bedtime  dextrose 5%. 1000 milliLiter(s) (50 mL/Hr) IV Continuous <Continuous>  dextrose 5%. 1000 milliLiter(s) (100 mL/Hr) IV Continuous <Continuous>  dextrose 50% Injectable 12.5 Gram(s) IV Push once  dextrose 50% Injectable 25 Gram(s) IV Push once  dextrose 50% Injectable 25 Gram(s) IV Push once  dorzolamide 2%/timolol 0.5% Ophthalmic Solution 1 Drop(s) Both EYES two times a day  glucagon  Injectable 1 milliGRAM(s) IntraMuscular once  heparin   Injectable 5000 Unit(s) SubCutaneous every 12 hours  insulin glargine Injectable (LANTUS) 13 Unit(s) SubCutaneous at bedtime  insulin lispro (ADMELOG) corrective regimen sliding scale   SubCutaneous Before meals and at bedtime  insulin lispro Injectable (ADMELOG) 5 Unit(s) SubCutaneous three times a day before meals  lactobacillus acidophilus 1 Tablet(s) Oral every 12 hours  midodrine. 20 milliGRAM(s) Oral three times a day  mupirocin 2% Nasal 1 Application(s) Both Nostrils two times a day  piperacillin/tazobactam IVPB.. 3.375 Gram(s) IV Intermittent every 12 hours
MEDICATIONS  (STANDING):  atorvastatin 40 milliGRAM(s) Oral at bedtime  dextrose 5%. 1000 milliLiter(s) (50 mL/Hr) IV Continuous <Continuous>  dextrose 5%. 1000 milliLiter(s) (100 mL/Hr) IV Continuous <Continuous>  dextrose 50% Injectable 25 Gram(s) IV Push once  dextrose 50% Injectable 25 Gram(s) IV Push once  dextrose 50% Injectable 12.5 Gram(s) IV Push once  dorzolamide 2%/timolol 0.5% Ophthalmic Solution 1 Drop(s) Both EYES two times a day  glucagon  Injectable 1 milliGRAM(s) IntraMuscular once  heparin   Injectable 5000 Unit(s) SubCutaneous every 12 hours  insulin glargine Injectable (LANTUS) 5 Unit(s) SubCutaneous at bedtime  insulin lispro (ADMELOG) corrective regimen sliding scale   SubCutaneous Before meals and at bedtime  insulin lispro Injectable (ADMELOG) 3 Unit(s) SubCutaneous three times a day before meals  lactobacillus acidophilus 1 Tablet(s) Oral every 12 hours  midodrine. 10 milliGRAM(s) Oral three times a day  norepinephrine Infusion 0.05 MICROgram(s)/kG/Min (6 mL/Hr) IV Continuous <Continuous>  pantoprazole    Tablet 40 milliGRAM(s) Oral before breakfast  piperacillin/tazobactam IVPB.. 3.375 Gram(s) IV Intermittent every 12 hours  polyethylene glycol 3350 17 Gram(s) Oral daily  senna 2 Tablet(s) Oral at bedtime
MEDICATIONS  (STANDING):  acetaminophen     Tablet .. 1000 milliGRAM(s) Oral every 8 hours  atorvastatin 40 milliGRAM(s) Oral at bedtime  cefpodoxime 200 milliGRAM(s) Oral every 24 hours  dextrose 50% Injectable 12.5 Gram(s) IV Push once  dextrose 50% Injectable 25 Gram(s) IV Push once  dorzolamide 2%/timolol 0.5% Ophthalmic Solution 1 Drop(s) Both EYES two times a day  epoetin deidre (PROCRIT) Injectable 63114 Unit(s) IV Push <User Schedule>  gabapentin 100 milliGRAM(s) Oral two times a day  glucagon  Injectable 1 milliGRAM(s) IntraMuscular once  heparin   Injectable 5000 Unit(s) SubCutaneous every 12 hours  HYDROmorphone   Tablet 4 milliGRAM(s) Oral every 6 hours  insulin glargine Injectable (LANTUS) 13 Unit(s) SubCutaneous at bedtime  insulin lispro (ADMELOG) corrective regimen sliding scale   SubCutaneous Before meals and at bedtime  insulin lispro Injectable (ADMELOG) 5 Unit(s) SubCutaneous three times a day before meals  lactobacillus acidophilus 1 Tablet(s) Oral every 12 hours
MEDICATIONS  (STANDING):  atorvastatin 40 milliGRAM(s) Oral at bedtime  dextrose 5%. 1000 milliLiter(s) (50 mL/Hr) IV Continuous <Continuous>  dextrose 5%. 1000 milliLiter(s) (100 mL/Hr) IV Continuous <Continuous>  dextrose 50% Injectable 12.5 Gram(s) IV Push once  dextrose 50% Injectable 25 Gram(s) IV Push once  dextrose 50% Injectable 25 Gram(s) IV Push once  dorzolamide 2%/timolol 0.5% Ophthalmic Solution 1 Drop(s) Both EYES two times a day  glucagon  Injectable 1 milliGRAM(s) IntraMuscular once  heparin   Injectable 5000 Unit(s) SubCutaneous every 12 hours  insulin glargine Injectable (LANTUS) 13 Unit(s) SubCutaneous at bedtime  insulin lispro (ADMELOG) corrective regimen sliding scale   SubCutaneous Before meals and at bedtime  insulin lispro Injectable (ADMELOG) 5 Unit(s) SubCutaneous three times a day before meals  lactobacillus acidophilus 1 Tablet(s) Oral every 12 hours  midodrine. 20 milliGRAM(s) Oral three times a day  mupirocin 2% Nasal 1 Application(s) Both Nostrils two times a day  piperacillin/tazobactam IVPB.. 3.375 Gram(s) IV Intermittent every 12 hours
MEDICATIONS  (STANDING):  atorvastatin 40 milliGRAM(s) Oral at bedtime  cefpodoxime 200 milliGRAM(s) Oral every 24 hours  dextrose 50% Injectable 25 Gram(s) IV Push once  dextrose 50% Injectable 12.5 Gram(s) IV Push once  dorzolamide 2%/timolol 0.5% Ophthalmic Solution 1 Drop(s) Both EYES two times a day  epoetin deidre (PROCRIT) Injectable 08832 Unit(s) IV Push <User Schedule>  gabapentin 100 milliGRAM(s) Oral three times a day  glucagon  Injectable 1 milliGRAM(s) IntraMuscular once  heparin   Injectable 5000 Unit(s) SubCutaneous every 12 hours  insulin glargine Injectable (LANTUS) 13 Unit(s) SubCutaneous at bedtime  insulin lispro (ADMELOG) corrective regimen sliding scale   SubCutaneous Before meals and at bedtime  insulin lispro Injectable (ADMELOG) 5 Unit(s) SubCutaneous three times a day before meals  lactobacillus acidophilus 1 Tablet(s) Oral every 12 hours  midodrine. 20 milliGRAM(s) Oral three times a day  oxyCODONE    IR 5 milliGRAM(s) Oral every 6 hours
MEDICATIONS  (STANDING):  atorvastatin 40 milliGRAM(s) Oral at bedtime  dextrose 5%. 1000 milliLiter(s) (50 mL/Hr) IV Continuous <Continuous>  dextrose 5%. 1000 milliLiter(s) (100 mL/Hr) IV Continuous <Continuous>  dextrose 50% Injectable 12.5 Gram(s) IV Push once  dextrose 50% Injectable 25 Gram(s) IV Push once  dextrose 50% Injectable 25 Gram(s) IV Push once  dorzolamide 2%/timolol 0.5% Ophthalmic Solution 1 Drop(s) Both EYES two times a day  glucagon  Injectable 1 milliGRAM(s) IntraMuscular once  heparin   Injectable 5000 Unit(s) SubCutaneous every 12 hours  insulin glargine Injectable (LANTUS) 13 Unit(s) SubCutaneous at bedtime  insulin lispro (ADMELOG) corrective regimen sliding scale   SubCutaneous Before meals and at bedtime  insulin lispro Injectable (ADMELOG) 5 Unit(s) SubCutaneous three times a day before meals  lactobacillus acidophilus 1 Tablet(s) Oral every 12 hours  midodrine. 20 milliGRAM(s) Oral three times a day  mupirocin 2% Nasal 1 Application(s) Both Nostrils two times a day  piperacillin/tazobactam IVPB.. 3.375 Gram(s) IV Intermittent every 12 hours
MEDICATIONS  (STANDING):  atorvastatin 40 milliGRAM(s) Oral at bedtime  dextrose 5%. 1000 milliLiter(s) (50 mL/Hr) IV Continuous <Continuous>  dextrose 5%. 1000 milliLiter(s) (100 mL/Hr) IV Continuous <Continuous>  dextrose 50% Injectable 12.5 Gram(s) IV Push once  dextrose 50% Injectable 25 Gram(s) IV Push once  dextrose 50% Injectable 25 Gram(s) IV Push once  dorzolamide 2%/timolol 0.5% Ophthalmic Solution 1 Drop(s) Both EYES two times a day  glucagon  Injectable 1 milliGRAM(s) IntraMuscular once  heparin   Injectable 5000 Unit(s) SubCutaneous every 12 hours  insulin glargine Injectable (LANTUS) 13 Unit(s) SubCutaneous at bedtime  insulin lispro (ADMELOG) corrective regimen sliding scale   SubCutaneous Before meals and at bedtime  insulin lispro Injectable (ADMELOG) 5 Unit(s) SubCutaneous three times a day before meals  lactobacillus acidophilus 1 Tablet(s) Oral every 12 hours  midodrine. 20 milliGRAM(s) Oral three times a day  mupirocin 2% Nasal 1 Application(s) Both Nostrils two times a day  piperacillin/tazobactam IVPB.. 3.375 Gram(s) IV Intermittent every 12 hours
MEDICATIONS  (STANDING):  atorvastatin 40 milliGRAM(s) Oral at bedtime  dextrose 50% Injectable 25 Gram(s) IV Push once  dextrose 50% Injectable 12.5 Gram(s) IV Push once  dextrose 50% Injectable 25 Gram(s) IV Push once  dorzolamide 2%/timolol 0.5% Ophthalmic Solution 1 Drop(s) Both EYES two times a day  glucagon  Injectable 1 milliGRAM(s) IntraMuscular once  insulin lispro (ADMELOG) corrective regimen sliding scale   SubCutaneous every 6 hours  lactated ringers. 1000 milliLiter(s) (75 mL/Hr) IV Continuous <Continuous>  lactobacillus acidophilus 1 Tablet(s) Oral every 12 hours  midodrine. 10 milliGRAM(s) Oral three times a day  pantoprazole    Tablet 40 milliGRAM(s) Oral before breakfast  phenylephrine    Infusion 0.5 MICROgram(s)/kG/Min (11.1 mL/Hr) IV Continuous <Continuous>  piperacillin/tazobactam IVPB.. 3.375 Gram(s) IV Intermittent every 12 hours  senna 2 Tablet(s) Oral at bedtime  sodium chloride 0.9%. 1000 milliLiter(s) (50 mL/Hr) IV Continuous <Continuous>  vancomycin  IVPB 500 milliGRAM(s) IV Intermittent once
MEDICATIONS  (STANDING):  atorvastatin 40 milliGRAM(s) Oral at bedtime  cefpodoxime 200 milliGRAM(s) Oral every 24 hours  dextrose 50% Injectable 25 Gram(s) IV Push once  dextrose 50% Injectable 12.5 Gram(s) IV Push once  dorzolamide 2%/timolol 0.5% Ophthalmic Solution 1 Drop(s) Both EYES two times a day  epoetin deidre (PROCRIT) Injectable 35392 Unit(s) IV Push <User Schedule>  gabapentin 100 milliGRAM(s) Oral three times a day  glucagon  Injectable 1 milliGRAM(s) IntraMuscular once  heparin   Injectable 5000 Unit(s) SubCutaneous every 12 hours  insulin glargine Injectable (LANTUS) 13 Unit(s) SubCutaneous at bedtime  insulin lispro (ADMELOG) corrective regimen sliding scale   SubCutaneous Before meals and at bedtime  insulin lispro Injectable (ADMELOG) 5 Unit(s) SubCutaneous three times a day before meals  lactobacillus acidophilus 1 Tablet(s) Oral every 12 hours  midodrine. 20 milliGRAM(s) Oral three times a day  oxyCODONE    IR 5 milliGRAM(s) Oral every 6 hours
MEDICATIONS  (STANDING):  atorvastatin 40 milliGRAM(s) Oral at bedtime  cefpodoxime 200 milliGRAM(s) Oral every 24 hours  dextrose 50% Injectable 25 Gram(s) IV Push once  dextrose 50% Injectable 12.5 Gram(s) IV Push once  dorzolamide 2%/timolol 0.5% Ophthalmic Solution 1 Drop(s) Both EYES two times a day  epoetin deidre (PROCRIT) Injectable 55581 Unit(s) IV Push <User Schedule>  gabapentin 100 milliGRAM(s) Oral three times a day  glucagon  Injectable 1 milliGRAM(s) IntraMuscular once  heparin   Injectable 5000 Unit(s) SubCutaneous every 12 hours  insulin glargine Injectable (LANTUS) 13 Unit(s) SubCutaneous at bedtime  insulin lispro (ADMELOG) corrective regimen sliding scale   SubCutaneous Before meals and at bedtime  insulin lispro Injectable (ADMELOG) 5 Unit(s) SubCutaneous three times a day before meals  lactobacillus acidophilus 1 Tablet(s) Oral every 12 hours  midodrine. 20 milliGRAM(s) Oral three times a day  oxyCODONE    IR 5 milliGRAM(s) Oral every 6 hours

## 2023-10-24 NOTE — PROGRESS NOTE ADULT - PROBLEM SELECTOR PROBLEM 2
Necrotizing fasciitis of lower leg

## 2023-10-24 NOTE — PATIENT CHOICE NOTE. - NSPTCHOICESTATE_GEN_ALL_CORE
I have met with the patient and/or caregiver to discuss discharge goals and treatment plan. Patient and/or caregiver also provided with instructions on accessing the CMS Compare websites for additional information related to Post Acute Provider quality and resource use measures to assist them in evaluation of the providers and in selecting their post-acute provider of choice. Patient and caregiver were informed of the facilities that are owned and/or operated by [facility]. I have discussed with the patient the availability of in-network facilities and providers. Patient and caregiver provided with a list of post-acute providers whose services are appropriate to the discharge plans and patient needs.     For patient requiring durable medical equipment, patient and/or caregiver were informed that they have the right to request who provides the required equipment.
I have met with the patient and/or caregiver to discuss discharge goals and treatment plan. Patient and/or caregiver also provided with instructions on accessing the CMS Compare websites for additional information related to Post Acute Provider quality and resource use measures to assist them in evaluation of the providers and in selecting their post-acute provider of choice. Patient and caregiver were informed of the facilities that are owned and/or operated by [facility]. I have discussed with the patient the availability of in-network facilities and providers. Patient and caregiver provided with a list of post-acute providers whose services are appropriate to the discharge plans and patient needs.     For patient requiring durable medical equipment, patient and/or caregiver were informed that they have the right to request who provides the required equipment.
I have met with the patient and/or caregiver to discuss discharge goals and treatment plan. Patient and/or caregiver also provided with instructions on accessing the CMS Compare websites for additional information related to Post Acute Provider quality and resource use measures to assist them in evaluation of the providers and in selecting their post-acute provider of choice. Patient and caregiver were informed of the facilities that are owned and/or operated by [facility]. I have discussed with the patient the availability of in-network facilities and providers. Patient and caregiver provided with a list of post-acute providers whose services are appropriate to the discharge plans and patient needs.     For patient requiring durable medical equipment, patient and/or caregiver were informed that they have the right to request who provides the required equipment.

## 2023-10-24 NOTE — PROGRESS NOTE ADULT - ASSESSMENT
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 Sin/Dr. Singh) Presents to ED [facility] 10/10 with weakness for 2 weeks. 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 Wound care consult and ID consults are requested .

## 2023-10-24 NOTE — PROGRESS NOTE ADULT - PROBLEM SELECTOR PROBLEM 6
DM (diabetes mellitus), type 2

## 2023-10-24 NOTE — PROGRESS NOTE ADULT - ASSESSMENT
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 Sin/Dr. Singh) Presents to ED [facility] 10/10 with weakness for 2 weeks. 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 Wound care consult and ID consults are requested . (10 Oct 2023 12:52)      esrd on hd for mwf   Excess fluids and waste products will be removed from your blood; your electrolytes will be balanced; your blood pressure will be controlled.    Admit for septic workup and ID evaluation,send blood and urine cx,serial lactate levels,monitor vitals closley,ivfs hydration,monitor urine output and renal profile,iv abx as per id cons  cefpodoxime 200 milliGRAM(s) Oral every 24 hours    BP monitoring,continue current  meds, low salt diet,followup with PMD in 1-2 weeks  midodrine. 10 milliGRAM(s) Oral three times a day        ANEMIA PLAN:  Anemia of chronic disease:  We will continue epo aiming for a HCT of 32-36 %.   We will monitor Iron stores, B12 and RBC folate .    dvt heparin   Injectable 5000 Unit(s) SubCutaneous every 12 hours  Taken to OR for urgent left knee disarticulation for gas gangrne  POD#4

## 2023-10-24 NOTE — PROGRESS NOTE ADULT - SUBJECTIVE AND OBJECTIVE BOX
Patient is a 48y Male whom presented to the hospital with esrd on hd     PAST MEDICAL & SURGICAL HISTORY:  ESRD on dialysis      H/O hypotension      Diabetes mellitus      Leg wound, left      Steal syndrome dialysis vascular access      Gangrene of finger of right hand      PVD (peripheral vascular disease)      Anemia of chronic disease      Constipation      HLD (hyperlipidemia)      S/P BKA (below knee amputation) bilateral      AV fistula          MEDICATIONS  (STANDING):  atorvastatin 40 milliGRAM(s) Oral at bedtime  collagenase Ointment 1 Application(s) Topical daily  dextrose 5%. 1000 milliLiter(s) (50 mL/Hr) IV Continuous <Continuous>  dextrose 5%. 1000 milliLiter(s) (100 mL/Hr) IV Continuous <Continuous>  dextrose 50% Injectable 25 Gram(s) IV Push once  dextrose 50% Injectable 25 Gram(s) IV Push once  dextrose 50% Injectable 12.5 Gram(s) IV Push once  dorzolamide 2%/timolol 0.5% Ophthalmic Solution 1 Drop(s) Both EYES two times a day  glucagon  Injectable 1 milliGRAM(s) IntraMuscular once  heparin   Injectable 5000 Unit(s) SubCutaneous every 12 hours  insulin lispro (ADMELOG) corrective regimen sliding scale   SubCutaneous every 6 hours  lactobacillus acidophilus 1 Tablet(s) Oral every 12 hours  midodrine. 10 milliGRAM(s) Oral three times a day  pantoprazole    Tablet 40 milliGRAM(s) Oral before breakfast  piperacillin/tazobactam IVPB.. 3.375 Gram(s) IV Intermittent every 12 hours  senna 2 Tablet(s) Oral at bedtime      Allergies    No Known Drug Allergies  [place] (Rash)    Intolerances        SOCIAL HISTORY:  Denies ETOh,Smoking,     FAMILY HISTORY:      REVIEW OF SYSTEMS:    CONSTITUTIONAL: No weakness, fevers or chills  EYES/ENT: No visual changes;  no throat pain   NECK: No pain or stiffness  RESPIRATORY: No cough, wheezing, hemoptysis; No shortness of breath  CARDIOVASCULAR: No chest pain or palpitations  GASTROINTESTINAL: No abdominal or epigastric pain. No nausea, vomiting,                                                                          9.0    9.12  )-----------( 644      ( 23 Oct 2023 09:28 )             28.9       CBC Full  -  ( 23 Oct 2023 09:28 )  WBC Count : 9.12 K/uL  RBC Count : 3.00 M/uL  Hemoglobin : 9.0 g/dL  Hematocrit : 28.9 %  Platelet Count - Automated : 644 K/uL  Mean Cell Volume : 96.3 fl  Mean Cell Hemoglobin : 30.0 pg  Mean Cell Hemoglobin Concentration : 31.1 gm/dL  Auto Neutrophil # : x  Auto Lymphocyte # : x  Auto Monocyte # : x  Auto Eosinophil # : x  Auto Basophil # : x  Auto Neutrophil % : x  Auto Lymphocyte % : x  Auto Monocyte % : x  Auto Eosinophil % : x  Auto Basophil % : x      10-23    135  |  99  |  71<H>  ----------------------------<  116<H>  5.4<H>   |  21<L>  |  12.00<H>    Ca    9.3      23 Oct 2023 09:28    TPro  7.7  /  Alb  2.0<L>  /  TBili  0.3  /  DBili  x   /  AST  24  /  ALT  25  /  AlkPhos  157<H>  10-23      CAPILLARY BLOOD GLUCOSE      POCT Blood Glucose.: 138 mg/dL (24 Oct 2023 08:25)  POCT Blood Glucose.: 104 mg/dL (23 Oct 2023 21:15)  POCT Blood Glucose.: 123 mg/dL (23 Oct 2023 17:25)  POCT Blood Glucose.: 131 mg/dL (23 Oct 2023 12:58)      Vital Signs Last 24 Hrs  T(C): 36.6 (24 Oct 2023 05:00), Max: 37 (23 Oct 2023 21:01)  T(F): 97.8 (24 Oct 2023 05:00), Max: 98.6 (23 Oct 2023 21:01)  HR: 65 (24 Oct 2023 05:00) (61 - 77)  BP: 136/68 (24 Oct 2023 05:00) (78/47 - 156/62)  BP(mean): --  RR: 17 (24 Oct 2023 05:00) (17 - 18)  SpO2: 98% (24 Oct 2023 05:00) (98% - 100%)    Parameters below as of 24 Oct 2023 05:00  Patient On (Oxygen Delivery Method): room air        Urinalysis Basic - ( 23 Oct 2023 09:28 )    Color: x / Appearance: x / SG: x / pH: x  Gluc: 116 mg/dL / Ketone: x  / Bili: x / Urobili: x   Blood: x / Protein: x / Nitrite: x   Leuk Esterase: x / RBC: x / WBC x   Sq Epi: x / Non Sq Epi: x / Bacteria: x                  PHYSICAL EXAM:    Constitutional: NAD  HEENT: conjunctive   clear   Neck:  No JVD  Respiratory: CTAB  Cardiovascular: S1 and S2  Gastrointestinal: BS+, soft, NT/ND  Extremities: No peripheral edema  Neurological: no focal deficits  Psychiatric: Normal mood, normal affect  : No [place]  Skin: No rashes   S/P BKA (below knee amputation) bilateral  AV fistula  LABS:

## 2023-11-08 ENCOUNTER — APPOINTMENT (OUTPATIENT)
Dept: VASCULAR SURGERY | Facility: CLINIC | Age: 48
End: 2023-11-08
Payer: MEDICAID

## 2023-11-08 VITALS
BODY MASS INDEX: 27.62 KG/M2 | HEART RATE: 66 BPM | DIASTOLIC BLOOD PRESSURE: 88 MMHG | OXYGEN SATURATION: 99 % | WEIGHT: 176 LBS | RESPIRATION RATE: 18 BRPM | SYSTOLIC BLOOD PRESSURE: 153 MMHG | HEIGHT: 67 IN

## 2023-11-08 DIAGNOSIS — Z99.2 DEPENDENCE ON RENAL DIALYSIS: ICD-10-CM

## 2023-11-08 PROCEDURE — 99024 POSTOP FOLLOW-UP VISIT: CPT

## 2023-11-10 NOTE — ED PROVIDER NOTE - RAPID ASSESSMENT
Please have your MRI done as scheduled. Please also get dental clearance for Xgeva/ zometa. Rapid assessment performed in ambulance bay due to ED overcrowding.  Patient screened by me.  Patient is transferred from [facility] For operative intervention of right hand gangrene.  Patient reports pain in second and third digit ongoing for few weeks as well as blackening to the third digit over that period of time.  Seen and evaluated at [facility], had multiple consults including ID and plastic surgery who recommended transfer to higher level of care, was given broad-spectrum antibiotics of Zosyn vancomycin and clindamycin.  At this time patient does not report any dizziness or lightheadedness however he is blood pressure is on the softer side, lungs are clear so will order small bolus fluids recently dialyzed yesterday, hand surgery team consulted and on their way down, care signed out to Dr. Menjivar in the receiving area of the ER. Rapid assessment performed in ambulance bay due to ED overcrowding.  Patient screened by me.  Patient is transferred from [facility] For operative intervention of right hand gangrene.  Patient reports pain in second and third digit ongoing for few weeks as well as blackening to the third digit over that period of time.  Seen and evaluated at [facility], had multiple consults including ID and plastic surgery who recommended transfer to higher level of care, was given broad-spectrum antibiotics of Zosyn vancomycin and clindamycin.  Patient awake, alert, appears nontoxic, unlabored breathing, clear lungs, third digit completely necrotic almost to region of mcp joint 2nd digit with tip with regions of blackening, insensate, entire hand appears swollen, rest of hand warm.  At this time patient does not report any dizziness or lightheadedness however he is blood pressure is on the softer side, lungs are clear so will order small bolus fluids recently dialyzed yesterday, hand surgery team consulted and on their way down, care signed out to Dr. Menjivar in the receiving area of the ER.

## 2023-11-29 ENCOUNTER — APPOINTMENT (OUTPATIENT)
Dept: VASCULAR SURGERY | Facility: CLINIC | Age: 48
End: 2023-11-29
Payer: MEDICAID

## 2023-11-29 VITALS
WEIGHT: 176 LBS | SYSTOLIC BLOOD PRESSURE: 147 MMHG | DIASTOLIC BLOOD PRESSURE: 81 MMHG | OXYGEN SATURATION: 100 % | HEIGHT: 67 IN | RESPIRATION RATE: 18 BRPM | HEART RATE: 78 BPM | BODY MASS INDEX: 27.62 KG/M2

## 2023-11-29 DIAGNOSIS — S78.119A COMPLETE TRAUMATIC AMPUTATION AT LVL BETWEEN UNSPECIFIED HIP AND KNEE, INITIAL ENCOUNTER: ICD-10-CM

## 2023-11-29 PROCEDURE — 99024 POSTOP FOLLOW-UP VISIT: CPT

## 2024-01-19 RX ORDER — ATORVASTATIN CALCIUM 80 MG/1
1 TABLET, FILM COATED ORAL
Refills: 0 | DISCHARGE

## 2024-01-19 RX ORDER — CEPHALEXIN 500 MG
1 CAPSULE ORAL
Qty: 0 | Refills: 0 | DISCHARGE

## 2024-01-19 RX ORDER — INSULIN LISPRO 100/ML
4 VIAL (ML) SUBCUTANEOUS
Refills: 0 | DISCHARGE

## 2024-01-19 RX ORDER — AMLODIPINE BESYLATE 2.5 MG/1
1 TABLET ORAL
Refills: 0 | DISCHARGE

## 2024-01-19 RX ORDER — SENNA PLUS 8.6 MG/1
2 TABLET ORAL
Refills: 0 | DISCHARGE

## 2024-01-19 RX ORDER — SUCROFERRIC OXYHYDROXIDE 500 MG/1
3 TABLET, CHEWABLE ORAL
Refills: 0 | DISCHARGE

## 2024-01-19 RX ORDER — INSULIN GLARGINE 100 [IU]/ML
4 INJECTION, SOLUTION SUBCUTANEOUS
Refills: 0 | DISCHARGE

## 2024-02-28 ENCOUNTER — APPOINTMENT (OUTPATIENT)
Dept: VASCULAR SURGERY | Facility: CLINIC | Age: 49
End: 2024-02-28

## 2024-02-28 NOTE — REVIEW OF SYSTEMS
SAFETY CHECKLIST  ID Bands and Risk clasps correct and in place (DNR, Fall risk, Allergy, Latex, Limb):  Yes  All Lines Reconciled and labeled correctly: Yes  Whiteboard updated:Yes  Environmental interventions: Yes  Verify Tele #:  2   [Joint Stiffness] : joint stiffness [Skin Wound] : skin wound [Negative] : Cardiovascular [Fever] : no fever [Chills] : no chills [Loss Of Hearing] : no hearing loss [Shortness Of Breath] : no shortness of breath [Abdominal Pain] : no abdominal pain [Anxiety] : no anxiety [Easy Bleeding] : no tendency for easy bleeding [de-identified] : DFU 3 plantar left heel , skin, subcutaneous , fat , fascia  [de-identified] : IDDM with neuropathy  [de-identified] : DEE DEE

## 2025-02-16 NOTE — DISCHARGE NOTE NURSING/CASE MANAGEMENT/SOCIAL WORK - HISTORY OF COVID-19 VACCINATION
Pt reports swelling to left side of lower gum line. States it started yesterday. States he does not have a dentist but plans to follow up with one on Monday. Airway intact. In stable condition   
Yes

## 2025-07-01 NOTE — PROGRESS NOTE ADULT - PROBLEM SELECTOR PLAN 4
Spoke with Vladimir at Dr. Maradiaga's office and advised her that this patient never established care with Dr. Colon that her current PCP in updated in her chart to fax over CT to current PCP      -f/u renal recs and plans for HD   -renally dose medications   -anemia of renal disease, c/w epo during HD as per renal  -renal restricted diet w/ nepro supplements  -plan for HD MWF, last session 3/10

## 2025-07-26 NOTE — PROGRESS NOTE ADULT - PROBLEM SELECTOR PLAN 4
Pt wheeled back from triage and needed help into bed. IV placed, labs drawn, and sent up. Pt having slight tremors otherwise alert and oriented.    ESRD on HD  - c/w HD as per renal  - anemia of renal disease, c/w epo during HD as per renal  - renal restricted diet w/ nepro supplements

## (undated) DEVICE — STAPLER SKIN VISI-STAT 35 WIDE

## (undated) DEVICE — GLV 8 PROTEXIS (CREAM) MICRO

## (undated) DEVICE — PACK LIJ BASIC ORTHO

## (undated) DEVICE — DRSG SIMPLACE VAC MEDIUM

## (undated) DEVICE — LAP PAD W RING 18 X 18"

## (undated) DEVICE — SUT ETHILON 4-0 18" FS-1

## (undated) DEVICE — ELCTR GROUNDING PAD ADULT COVIDIEN

## (undated) DEVICE — DRSG KLING 4"

## (undated) DEVICE — DRSG DERMABOND 0.7ML

## (undated) DEVICE — PACKING GAUZE PLAIN 2"

## (undated) DEVICE — VENODYNE/SCD SLEEVE CALF LARGE

## (undated) DEVICE — DRAPE SURGICAL #1010

## (undated) DEVICE — SUT SILK 0 30" TIES

## (undated) DEVICE — DRSG WEBRIL 4"

## (undated) DEVICE — DRSG VAC GRANUFOAM LARGE (BLACK)

## (undated) DEVICE — DRSG STERISTRIPS 0.5 X 4"

## (undated) DEVICE — SOL IRR BAG NS 0.9% 3000ML

## (undated) DEVICE — GLV 7.5 PROTEXIS (WHITE)

## (undated) DEVICE — SAW BLADE STRYKER 13X1.27X90MM

## (undated) DEVICE — WARMING BLANKET LOWER ADULT

## (undated) DEVICE — BRACE KNEE IMMOBILIZER SPLINT SUPER XL 20"

## (undated) DEVICE — CANISTER W/GEL INFOVAC 500ML

## (undated) DEVICE — DRAPE FEMORAL ANGIOGRAPHY W TROUGH

## (undated) DEVICE — LIJ-SYNTHES LOCKING SMALL FRAGMENT (REQUIRES BILL) (IMPLANT): Type: DURABLE MEDICAL EQUIPMENT

## (undated) DEVICE — DRSG XEROFORM 5 X 9"

## (undated) DEVICE — LABELS BLANK W PEN

## (undated) DEVICE — GLV 6.5 PROTEXIS (WHITE)

## (undated) DEVICE — SOL IRR POUR NS 0.9% 500ML

## (undated) DEVICE — Device

## (undated) DEVICE — APPLICATOR ESWAB 1ML LIQUID AMIES REG

## (undated) DEVICE — TORQUE DEVICE FOR GUIDEWIRE 0.0100.038"

## (undated) DEVICE — BLADE SURGICAL #15 CARBON

## (undated) DEVICE — PROTECTOR HEEL / ELBOW FLUFFY

## (undated) DEVICE — PACK VASCULAR

## (undated) DEVICE — STRYKER INTERPULSE HANDPIECE W IRR SUCTION TUBE

## (undated) DEVICE — DRAPE COVER SNAP 36X30"

## (undated) DEVICE — SUT MONOCRYL 4-0 27" PS-2 UNDYED

## (undated) DEVICE — SAW BLADE MICROAIRE SAGITTAL 9.4MMX25.4MMX0.6MM

## (undated) DEVICE — TUBING IRR SET FOR CYSTOSCOPY 77"

## (undated) DEVICE — DRSG KLING 2"

## (undated) DEVICE — HANDPIECE INTERPULSE W MULTI TIP

## (undated) DEVICE — DRAIN JACKSON PRATT 3 SPRING RESERVOIR W 15FR PVC DRAIN

## (undated) DEVICE — SUT SOFSILK 2-0 30" V-20

## (undated) DEVICE — DRAPE SPLIT SHEET 77" X 108"

## (undated) DEVICE — DRAIN RESERVOIR FOR JACKSON PRATT 100CC CARDINAL

## (undated) DEVICE — DRAPE C ARM 41X74"

## (undated) DEVICE — VENODYNE/SCD SLEEVE CALF MEDIUM

## (undated) DEVICE — MARKING PEN W RULER

## (undated) DEVICE — SUT ETHILON 4-0 18" PS-2

## (undated) DEVICE — TOURNIQUET CUFF 18" DUAL PORT SINGLE BLADDER W PLC  (BLACK)

## (undated) DEVICE — PACK EXTREMITY

## (undated) DEVICE — POSITIONER PATIENT SAFETY STRAP 3X60"

## (undated) DEVICE — BRACE KNEE IMMOBILIZER SPLINT SUPER SMALL 20"

## (undated) DEVICE — SOL IRR POUR H2O 1500ML

## (undated) DEVICE — SUT POLYSORB 2-0 30" GS-21 UNDYED

## (undated) DEVICE — GLV 7.5 PROTEXIS (CREAM) MICRO

## (undated) DEVICE — GLV 8 PROTEXIS (WHITE)

## (undated) DEVICE — DRSG MASTISOL

## (undated) DEVICE — WARMING BLANKET UPPER ADULT

## (undated) DEVICE — DRSG COBAN 4"

## (undated) DEVICE — FRAZIER SUCTION TIP 8FR

## (undated) DEVICE — SOL IRR POUR NS 0.9% 1000ML

## (undated) DEVICE — POSITIONER STRAP ARMBOARD VELCRO TS-30

## (undated) DEVICE — DRAPE U POLY BLUE 60"X60"

## (undated) DEVICE — DRSG STOCKINETTE IMPERVIOUS LG

## (undated) DEVICE — SUT VICRYL 4-0 18" PS-2 UNDYED

## (undated) DEVICE — PACK MINOR WITH LAP

## (undated) DEVICE — TOURNIQUET ESMARK 4"

## (undated) DEVICE — PACK LOWER EXTREMITY NS PLAINVI

## (undated) DEVICE — SLING SHOULDER IMMOBILIZER CLINIC MEDIUM

## (undated) DEVICE — CANISTER KCI 500ML GEL SENSA TRAC

## (undated) DEVICE — SOL IRR POUR H2O 250ML

## (undated) DEVICE — SUCTION YANKAUER NO CONTROL VENT

## (undated) DEVICE — DRSG KERLIX ROLL 4.5"

## (undated) DEVICE — CANISTER W/GEL INFOVAC 1000ML

## (undated) DEVICE — GLV 7 PROTEXIS (WHITE)

## (undated) DEVICE — DRAIN JACKSON PRATT 10MM FLAT FULL NO TROCAR

## (undated) DEVICE — STAPLER SKIN MULTI DIRECTION W35

## (undated) DEVICE — DRSG COMBINE 5X9"

## (undated) DEVICE — DRSG ACE BANDAGE 4" NS

## (undated) DEVICE — SUT VICRYL 0 36" CT-1 UNDYED

## (undated) DEVICE — SUT SOFSILK 2-0 18" C-15

## (undated) DEVICE — SAW BLADE STRYKER SAGITTAL 3 HOLE OSCILLATING

## (undated) DEVICE — LIJ-K WIRE TRAY (REQUIRES BILL) (IMPLANT): Type: DURABLE MEDICAL EQUIPMENT

## (undated) DEVICE — DRSG CURITY GAUZE SPONGE 4 X 4" 12-PLY

## (undated) DEVICE — DRAIN JACKSON PRATT 7MM FLAT FULL W 15 FR TROCAR

## (undated) DEVICE — GLV 8.5 PROTEXIS (WHITE)

## (undated) DEVICE — SUT SOFSILK 2-0 30" TIES

## (undated) DEVICE — PREP CHLORAPREP HI-LITE ORANGE 26ML

## (undated) DEVICE — LAP PAD 18 X 18"

## (undated) DEVICE — CONN Y

## (undated) DEVICE — BLADE SCALPEL SAFETYLOCK #15

## (undated) DEVICE — SPECIMEN CONTAINER 100ML

## (undated) DEVICE — BRACE KNEE IMMOBILIZER SPLINT SUPER LARGE 20"

## (undated) DEVICE — SUT NYLON 2-0 18" FS

## (undated) DEVICE — TAPE SILK 3"

## (undated) DEVICE — SOL IRR BAG NS 0.9% 1000ML

## (undated) DEVICE — TUBING HIGH POWER CONTRAST INJ

## (undated) DEVICE — DRSG STERISTRIPS 0.25 X 3"

## (undated) DEVICE — SUT VICRYL 3-0 18" PS-2 UNDYED

## (undated) DEVICE — SOL IRR POUR H2O 500ML

## (undated) DEVICE — DRAPE TOWEL BLUE 17" X 24"

## (undated) DEVICE — DRSG TEGADERM 4X4.75"

## (undated) DEVICE — MEDICATION LABELS W MARKER

## (undated) DEVICE — GOWN TRIMAX LG

## (undated) DEVICE — SYR LUER LOK 10CC

## (undated) DEVICE — VISITEC 4X4

## (undated) DEVICE — DRSG ACE BANDAGE 6"

## (undated) DEVICE — TOURNIQUET CUFF 18" DUAL PORT DUAL BLADDER W PLC (BLACK)

## (undated) DEVICE — LIJ/LIA-TOURNIQUET #1 4014EABH: Type: DURABLE MEDICAL EQUIPMENT

## (undated) DEVICE — TOURNIQUET CUFF 30" SINGLE PORT W PLC

## (undated) DEVICE — DRAPE INSTRUMENT POUCH 6.75" X 11"

## (undated) DEVICE — DRSG STERISTRIPS 0.25 X 4"

## (undated) DEVICE — DRSG COBAN 6"

## (undated) DEVICE — SUT MONOSOF 3-0 18" C-14

## (undated) DEVICE — PREP BETADINE KIT

## (undated) DEVICE — SOL IRR POUR NS 0.9% 1500ML

## (undated) DEVICE — FOLEY TRAY 16FR 5CC LTX UMETER CLOSED

## (undated) DEVICE — SOL IRR POUR H2O 1000ML

## (undated) DEVICE — SYR LUER LOK 5CC

## (undated) DEVICE — TRAP SPECIMEN SPUTUM 40CC

## (undated) DEVICE — KIT POST MORTEM PEDS

## (undated) DEVICE — POSITIONER FOAM EGG CRATE ULNAR 2PCS (PINK)

## (undated) DEVICE — PACK HAND TRAY

## (undated) DEVICE — PLV-SCD MACHINE: Type: DURABLE MEDICAL EQUIPMENT

## (undated) DEVICE — DRAPE 3/4 SHEET 52X76"

## (undated) DEVICE — SUT VICRYL 2-0 27" FS-1 UNDYED

## (undated) DEVICE — PLV-STRYKER SYSTEM 8: Type: DURABLE MEDICAL EQUIPMENT

## (undated) DEVICE — DRSG STOCKINETTE IMPERVIOUS XL

## (undated) DEVICE — PLV/PSP-ESU FORCEFX F8I7418A: Type: DURABLE MEDICAL EQUIPMENT

## (undated) DEVICE — DRSG XEROFORM 1 X 8"

## (undated) DEVICE — DRSG ACE BANDAGE 4"

## (undated) DEVICE — MARKING PEN W RULER / LABELS

## (undated) DEVICE — SUT POLYSORB 3-0 30" V-20 UNDYED

## (undated) DEVICE — BLADE SURGICAL #11 CARBON

## (undated) DEVICE — SYR LUER LOK 20CC

## (undated) DEVICE — DRSG ADAPTIC 3 X 8"

## (undated) DEVICE — SUT MONOSOF 2-0 18" C-15

## (undated) DEVICE — BLADE SCALPEL SAFETYLOCK #10

## (undated) DEVICE — SUT ETHILON 3-0 18" FS-1

## (undated) DEVICE — WARMING BLANKET FULL ADULT

## (undated) DEVICE — DRAPE IOBAN 23" X 23"

## (undated) DEVICE — BIPOLAR FORCEP KIRWAN JEWELERS STR 4" X 0.4MM W 12FT CORD (GREEN)

## (undated) DEVICE — BRACE KNEE IMMOBILIZER SPLINT SUPER MEDIUM 20"

## (undated) DEVICE — SUT SURGIPRO II 2-0 30" GS-21

## (undated) DEVICE — PREP SCRUB BRUSH W CHG 4%

## (undated) DEVICE — GLV 6 PROTEXIS (WHITE)

## (undated) DEVICE — SAW BLADE STRYKER SAGITTAL DUAL CUT 25X90X1.27MM

## (undated) DEVICE — DRAPE MAYO STAND 30"

## (undated) DEVICE — DRAPE 3/4 SHEET W REINFORCEMENT 56X77"

## (undated) DEVICE — DRAIN JACKSON PRATT 10FR ROUND END NO TROCAR

## (undated) DEVICE — ELCTR BOVIE TIP BLADE INSULATED 2.8" EDGE WITH SAFETY

## (undated) DEVICE — CANISTER DISPOSABLE THIN WALL 3000CC

## (undated) DEVICE — SUT POLYSORB 3-0 36" GS-21 UNDYED

## (undated) DEVICE — GOWN XL